# Patient Record
Sex: MALE | Race: BLACK OR AFRICAN AMERICAN | Employment: OTHER | URBAN - METROPOLITAN AREA
[De-identification: names, ages, dates, MRNs, and addresses within clinical notes are randomized per-mention and may not be internally consistent; named-entity substitution may affect disease eponyms.]

---

## 2019-01-23 ENCOUNTER — HOSPITAL ENCOUNTER (OUTPATIENT)
Age: 49
Setting detail: SPECIMEN
Discharge: HOME | End: 2019-01-23
Payer: MEDICARE

## 2019-01-23 LAB
ABSOLUTE EOS #: 0.19 K/UL (ref 0–0.44)
ABSOLUTE IMMATURE GRANULOCYTE: 0.26 K/UL (ref 0–0.3)
ABSOLUTE LYMPH #: 2.19 K/UL (ref 1.1–3.7)
ABSOLUTE MONO #: 0.63 K/UL (ref 0.1–1.2)
ALBUMIN SERPL-MCNC: 4.5 G/DL (ref 3.5–5.2)
ALBUMIN/GLOBULIN RATIO: 1.7 (ref 1–2.5)
ALP BLD-CCNC: 86 U/L (ref 40–129)
ALT SERPL-CCNC: 19 U/L (ref 5–41)
ANION GAP SERPL CALCULATED.3IONS-SCNC: 20 MMOL/L (ref 9–17)
AST SERPL-CCNC: 11 U/L
BASOPHILS # BLD: 1 % (ref 0–2)
BASOPHILS ABSOLUTE: 0.06 K/UL (ref 0–0.2)
BILIRUB SERPL-MCNC: 0.19 MG/DL (ref 0.3–1.2)
BUN BLDV-MCNC: 18 MG/DL (ref 6–20)
BUN/CREAT BLD: ABNORMAL (ref 9–20)
C-REACTIVE PROTEIN: 28.3 MG/L (ref 0–5)
CALCIUM SERPL-MCNC: 9.9 MG/DL (ref 8.6–10.4)
CHLORIDE BLD-SCNC: 102 MMOL/L (ref 98–107)
CO2: 27 MMOL/L (ref 20–31)
CREAT SERPL-MCNC: 1.1 MG/DL (ref 0.7–1.2)
DIFFERENTIAL TYPE: ABNORMAL
EOSINOPHILS RELATIVE PERCENT: 2 % (ref 1–4)
GFR AFRICAN AMERICAN: >60 ML/MIN
GFR NON-AFRICAN AMERICAN: >60 ML/MIN
GFR SERPL CREATININE-BSD FRML MDRD: ABNORMAL ML/MIN/{1.73_M2}
GFR SERPL CREATININE-BSD FRML MDRD: ABNORMAL ML/MIN/{1.73_M2}
GLUCOSE BLD-MCNC: 151 MG/DL (ref 70–99)
HBV SURFACE AB TITR SER: <3.5 MIU/ML
HCT VFR BLD CALC: 41.1 % (ref 40.7–50.3)
HEMOGLOBIN: 13.1 G/DL (ref 13–17)
HEPATITIS B CORE IGM ANTIBODY: NONREACTIVE
HEPATITIS C ANTIBODY: NONREACTIVE
IMMATURE GRANULOCYTES: 3 %
LYMPHOCYTES # BLD: 21 % (ref 24–43)
MCH RBC QN AUTO: 31.1 PG (ref 25.2–33.5)
MCHC RBC AUTO-ENTMCNC: 31.9 G/DL (ref 28.4–34.8)
MCV RBC AUTO: 97.6 FL (ref 82.6–102.9)
MONOCYTES # BLD: 6 % (ref 3–12)
NRBC AUTOMATED: 0 PER 100 WBC
PDW BLD-RTO: 16.4 % (ref 11.8–14.4)
PLATELET # BLD: 285 K/UL (ref 138–453)
PLATELET ESTIMATE: ABNORMAL
PMV BLD AUTO: 11.3 FL (ref 8.1–13.5)
POTASSIUM SERPL-SCNC: 4.4 MMOL/L (ref 3.7–5.3)
RBC # BLD: 4.21 M/UL (ref 4.21–5.77)
RBC # BLD: ABNORMAL 10*6/UL
RHEUMATOID FACTOR: <10 IU/ML
SEDIMENTATION RATE, ERYTHROCYTE: 36 MM (ref 0–10)
SEG NEUTROPHILS: 67 % (ref 36–65)
SEGMENTED NEUTROPHILS ABSOLUTE COUNT: 6.96 K/UL (ref 1.5–8.1)
SODIUM BLD-SCNC: 149 MMOL/L (ref 135–144)
TOTAL PROTEIN: 7.2 G/DL (ref 6.4–8.3)
URIC ACID: 11.3 MG/DL (ref 3.4–7)
WBC # BLD: 10.3 K/UL (ref 3.5–11.3)
WBC # BLD: ABNORMAL 10*3/UL

## 2019-01-24 LAB — CCP IGG ANTIBODIES: <1.5 U/ML

## 2019-01-25 LAB — HEPATITIS BE ANTIGEN: NEGATIVE

## 2019-01-26 LAB
QUANTI TB GOLD PLUS: NEGATIVE
QUANTI TB1 MINUS NIL: 0 IU/ML (ref 0–0.34)
QUANTI TB2 MINUS NIL: 0.02 IU/ML (ref 0–0.34)
QUANTIFERON MITOGEN: >10 IU/ML
QUANTIFERON NIL: 0.03 IU/ML

## 2019-04-09 ENCOUNTER — OUTSIDE SERVICES (OUTPATIENT)
Dept: FAMILY MEDICINE CLINIC | Age: 49
End: 2019-04-09
Payer: MEDICARE

## 2019-04-09 VITALS
TEMPERATURE: 100 F | HEART RATE: 102 BPM | BODY MASS INDEX: 42.48 KG/M2 | OXYGEN SATURATION: 94 % | WEIGHT: 279.4 LBS | DIASTOLIC BLOOD PRESSURE: 94 MMHG | SYSTOLIC BLOOD PRESSURE: 152 MMHG | RESPIRATION RATE: 18 BRPM

## 2019-04-09 DIAGNOSIS — R53.81 PHYSICAL DECONDITIONING: Primary | ICD-10-CM

## 2019-04-09 DIAGNOSIS — N39.0 URINARY TRACT INFECTION IN MALE: ICD-10-CM

## 2019-04-09 DIAGNOSIS — K21.9 GASTROESOPHAGEAL REFLUX DISEASE, ESOPHAGITIS PRESENCE NOT SPECIFIED: ICD-10-CM

## 2019-04-09 DIAGNOSIS — Z91.199 MEDICAL NON-COMPLIANCE: ICD-10-CM

## 2019-04-09 DIAGNOSIS — F33.42 RECURRENT MAJOR DEPRESSIVE DISORDER, IN FULL REMISSION (HCC): ICD-10-CM

## 2019-04-09 DIAGNOSIS — M06.09 RHEUMATOID ARTHRITIS OF MULTIPLE SITES WITH NEGATIVE RHEUMATOID FACTOR (HCC): ICD-10-CM

## 2019-04-09 DIAGNOSIS — R11.2 INTRACTABLE VOMITING WITH NAUSEA, UNSPECIFIED VOMITING TYPE: ICD-10-CM

## 2019-04-09 DIAGNOSIS — E11.42 TYPE 2 DIABETES MELLITUS WITH DIABETIC POLYNEUROPATHY, WITHOUT LONG-TERM CURRENT USE OF INSULIN (HCC): ICD-10-CM

## 2019-04-09 DIAGNOSIS — I10 ESSENTIAL HYPERTENSION: ICD-10-CM

## 2019-04-09 DIAGNOSIS — N40.0 BENIGN PROSTATIC HYPERPLASIA WITHOUT LOWER URINARY TRACT SYMPTOMS: ICD-10-CM

## 2019-04-09 DIAGNOSIS — R44.3 HALLUCINATIONS: ICD-10-CM

## 2019-04-09 DIAGNOSIS — D72.829 LEUKOCYTOSIS, UNSPECIFIED TYPE: ICD-10-CM

## 2019-04-09 DIAGNOSIS — R10.84 DIFFUSE ABDOMINAL PAIN: ICD-10-CM

## 2019-04-09 DIAGNOSIS — I48.91 ATRIAL FIBRILLATION, UNSPECIFIED TYPE (HCC): ICD-10-CM

## 2019-04-09 DIAGNOSIS — F39 MOOD DISORDER (HCC): ICD-10-CM

## 2019-04-09 DIAGNOSIS — R19.8 ABDOMINAL GUARDING: ICD-10-CM

## 2019-04-09 PROCEDURE — 99306 1ST NF CARE HIGH MDM 50: CPT | Performed by: FAMILY MEDICINE

## 2019-04-09 PROCEDURE — 3046F HEMOGLOBIN A1C LEVEL >9.0%: CPT | Performed by: FAMILY MEDICINE

## 2019-04-09 PROCEDURE — 3017F COLORECTAL CA SCREEN DOC REV: CPT | Performed by: FAMILY MEDICINE

## 2019-04-09 NOTE — PROGRESS NOTES
H&P (Admission H&P at [facility])        NAME: Nehemias Nieves  DATE: 19  ROOM #: 31-2  CODE STATUS: FULL CODE  REASON FOR ADMISSION: Unable to care for himself  : 1970  ADMISSION DATE: 2019  SKILLED PATIENT: Yes    History obtained from chart review, the patient and nursing staff. SUBJECTIVE:  HPI: Nehemias Nieves is a 46 y.o. male. Pt seen and examined at bedside. Pt admitted to use as direct admit from [facility] ER. However, proper protocol was not followed. Typically I must be allowed to review case prior to accepting. I was never notified. Appears pt was in ER d/t being unable to care for himself d/t his rheumatoid arthritis. Currently not following with rheum and doesn't take meds as he should. D/c to us on percocet and his home meds. However, in the ER and since here has had severe abdominal pain. Rates it as an 8/10. Sharp and severe. In ER had labs showing mild elevated WBC cnt and non-acute KUB. No further w/u obtained. He states he's been vomiting for days, has vomited several times since arrival at 0600. Unable to keep anything down. abd pain worsening. Passing flatus. Bowels normal.     Also given keflex for UTI      DM2: on janumet as OP. Pt not clear on control. Complicated by neuropathy. On jerod. Works ok. HTN: on norvasc, chlorthalidone, dilt 60mg tid, and hydralazine. BP high on admission, but in pain. Unclear control. Denies CP/sOB    AFib: not clear if parox or perm. On CCB and eliquis. Pt states hasn't seen cardio in \"forever\". Can't recall whom he saw last.    Depression/mood disorder: follows with Anita. On celexa and abilify. Has occ hallucinations. None at present. Denies SI/HI. GERD: on pantoprazole. Helps with his GERD,. Not with abd pain he's having at present    BPH: on flomax, when takes, works well for him      Allergies and Medications were reviewed through the [facility] EMR.  All medications reviewed and reconciled, including OTC and deconditioning    Once stable, will need PT/OT and be compliant with medical regimen    2. Diffuse abdominal pain    Unclear etiology  Worsening since admission  Not clear if having this severe of pain in ER  But with his hx and exam, needs f/u in ER before stable for admission  Report called to ER  F/u based on ER dispo    3. Abdominal guarding    As per # 2    4. Intractable vomiting with nausea, unspecified vomiting type    As per # 2    5. Leukocytosis, unspecified type    As per # 2  Could be d/t RA, but needs more of a w/u before stable for admission at the facility    6. Rheumatoid arthritis of multiple sites with negative rheumatoid factor ([address]    Neg labs for RA in the past, not to say he doesn't have sero neg RA  Is in between rheumatologists at present  Is on methotrexate and plaquenil, will con't this  If comes back to us today, will start on pred taper as well. Will see if can get into Dr. Danette Clay sooner, is on his schedule for OCT. 7. Urinary tract infection in male    Finish keflex  Unclear is cause of abd pain    8. Medical non-compliance    Causing a lot of his issues at present    9. Type 2 diabetes mellitus with diabetic polyneuropathy, without long-term current use of insulin ([facility]    Once returns to facility will get updated labs to where we are with control  Con't Janumet    10. Essential hypertension    con't chlorthalidone, dilt, hydralazine  Trend BP's upon return    11. Atrial fibrillation, unspecified type ([facility]    con't dilt and eliquis    12. Recurrent major depressive disorder, in full remission ([facility]    Appears stable  Will need f/u with [facility]  con't abilify and celexa    Antipsychotic/Antianxiety/Hypnotic/Psychotropic/Sedation/Antidepressant medications are continued at this time because discontinuation may result in adverse effects or return of concerning behaviors/symptoms. 13. Mood disorder ([facility]    As per # 12    14. Hallucinations    As per # 12    15.  Gastroesophageal reflux disease, esophagitis presence not specified    con't pqantoprazole    16. Benign prostatic hyperplasia without lower urinary tract symptoms    con't flomax      Disposition: sent back to ER d/t worsening abdominal pain, nausea and vomiting.  45+ minutes spent in chart review, patient evaluation and care plan creation      Electronically signed by Amy Almendarez DO on 4/9/2019 at 1:15 PM

## 2019-04-23 NOTE — PROGRESS NOTES
H&P ([facility])        NAME: Fransisca Eisenberg  DATE: 19  ROOM #: 40-2  CODE STATUS: FULL CODE  REASON FOR READMISSION: FEVER, NAUSEA, SEVERE RA FLARE  : 1970  ADMISSION DATE: 2019  READMISSION DATE: 2019  SKILLED PATIENT: Yes    History obtained from chart review, the patient and nursing staff. SUBJECTIVE:  HPI: Fransisca Eisenberg is a 46 y.o. male. Pt seen and examined at bedside. Patient admitted to [facility] from  to  for uro sepsis and RA flare. D/c summary:  Hospital Course - Discharge Diagnoses   (1) Rheumatoid arthritis Status: Chronic Qualifiers: Rheumatoid arthritis location: unspecified site Rheumatoid factor presence: unspecified presence Qualified Code(s): M06.9 - Rheumatoid arthritis, unspecified Home Going Treatment Plan: Rheumatoid arthritis flareup-resolved Patient was started on Solu-Medrol, kept on for many days, his her rheumatoid arthritis has been gradually improving. Imuran has been added. Patient will be discharged with Imuran, prednisone taper and instructions to resume taking his rheumatoid arthritis medications [patient has interrupted taking these medications for over 2 months due to not being able to be seen by a rheumatologist, which likely led to the flareup]     (2) Connective tissue disease Status: Acute     (3) Diastolic CHF Status: Acute Qualifiers: Heart failure chronicity: chronic Qualified Code(s): I50.32 - Chronic diastolic (congestive) heart failure Home Going Treatment Plan: With mild exacerbation on presentation. Patient was initially started on Lasix 40 mg IV, diuresed well and will currently be switched to oral Lasix.      (4) HTN (hypertension) Status: Chronic Qualifiers: Hypertension type: essential hypertension Qualified Code(s): I10 - Essential (primary) hypertension Home Going Treatment Plan: continue antihypertensives     (5) Weakness Status: Acute     (6) DM2 (diabetes mellitus, type 2) Status: Chronic admission have been appropriate 140s. No lows. HTN: on norvasc, dilt 60mg tid, and hydralazine. Was on chlorthalidone prior, but this was changed to lasix. BP since admission has been <140/90. No CP/SOB    AFib/HF with preserved EF: not clear if parox or perm. On CCB and eliquis as well as lasix. Pt states hasn't seen cardio in \"forever\". Can't recall whom he saw last. Denies CP/SOB    Depression/mood disorder: follows with Anita. On celexa and abilify. Has occ hallucinations. None at present. Denies SI/HI. GERD: on pantoprazole. Helps with his GERD. Denies sxs or dysphagia    BPH: on flomax, when takes, works well for him    Vit d def: noted on hospital. Treated during admission, but treatment not continued on d/c for unclear reasons. Low testosterone: given an IM dose of testosterone during admission. Unclear dose. Unclear plan at discharge. He discharged with no orders to continue. He has f/u labs for this ordered as well as AM and PM cortisol, but it's not clear who ordered these and why. They are set for 2 wks from now    Buttocks wound: following with Dr. Erika Beltran for this. Saw him earlier today. Wound care plan in place. Unable to assess. Denies sig pain. Allergies and Medications were reviewed through the [facility] EMR. All medications reviewed and reconciled, including OTC and herbal medications. Past Medical Hx  -01. DM2  -02. RA  -03. HTN  -04. GERD  -05. Atrial Fibrillation   -06. Major depression  -07. Mood disorder  -08. DURAN  -09. BPH  -10. Hx of ETOH abuse  -11. Morbid obesity  -12. Hx of R foot wound, osteomyelitis July 2018 requiring surgery and IV Vanco  -13. HF with reduced EF  -14. Vit D def  -15.  Low testosterone      Past Surgical History:   Procedure Laterality Date    FOOT SURGERY Right     2018, R foot osteomyelitis    TRACHEOSTOMY      as a baby       Allergies   Allergen Reactions    Pcn [Penicillins]        Social History     Tobacco Use    Smoking status: Never Smoker    Smokeless tobacco: Never Used   Substance Use Topics    Alcohol use: No     Comment: hx of abuse        Family History   Problem Relation Age of Onset    Heart Disease Mother         MI    Cancer Paternal Aunt         lung         I have reviewed the patient's past medical history, past surgical history, allergies, medications, social and family history and I have made updates where appropriate. Review of Systems  Positive responses are highlighted in bold    Constitutional:  Fever, Chills, Night Sweats, Fatigue, Unexpected changes in weight  Eyes:  Eye discharge, Eye pain, Eye redness, Visual disturbances   HENT:  Ear pain, Tinnitus, Nosebleeds, Trouble swallowing, Hearing loss, Sore throat  Cardiovascular:  Chest Pain, Palpitations, Orthopnea, Paroxysmal Nocturnal Dyspnea  Respiratory:  Cough, Wheezing, Shortness of breath, Chest tightness, Apnea  Gastrointestinal:  Nausea, Vomiting, Diarrhea, Constipation, Heartburn, Blood in stool  Genitourinary:  Difficulty or painful urination, Flank pain, Change in frequency, Urgency  Skin:  Color change, Rash, Itching, Wound  Psychiatric:  Hallucinations, Anxiety, Depression, Suicidal ideation  Hematological:  Enlarged glands, Easy bleeding, Easily bruising  Musculoskeletal:  Joint pain, Back pain, Gait problems, Joint swelling, Myalgias  Neurological:  Dizziness, Headaches, Presyncope, Numbness, Seizures, Tremors  Allergy:  Environmental allergies, Food allergies  Endocrine:  Heat Intolerance, Cold Intolerance, Polydipsia, Polyphagia, Polyuria      PHYSICAL EXAM:  Vitals:    04/25/19 0545   BP: 128/84   Pulse: 76   Resp: 18   Temp: 98.7 °F (37.1 °C)   Weight: 265 lb 6.4 oz (120.4 kg)   Height: 5' 8\" (1.727 m)     Body mass index is 40.35 kg/m².   Pain: 3 (joints/back)    VS Reviewed  General Appearance: well developed and well- nourished, in no acute distress  Head: normocephalic and atraumatic  Eyes: pupils equal, round, and reactive to light, mg/dL      Creatinine                       1.07                       0. 70-1.30 mg/dL      GFR Calculation                  > 60                           AGE(years)       AVERAGE GFR                            50-59           93 ml/min/1.73 square meters                           Note:This result is normalized to 1.73 square meter                                body surface area. Height and weight are not                                factored.                               Chronic Kidney Disease stages by NKDF                           Stage       eGFR                           --------------------------                            I           >90                            II          60-89                            III         30-59                            IV          15-29                            V           <15 or dialysis      Calcium                          8.5                  L     8.8-10.5 mg/dL      CBC 21 APR 2019  CBC with Differential      WBC                              10.8                 H     4.4-10.5 th/cmm      RBC                              3.91                 L     4.50-6.00 mil/cmm      Hemoglobin                       11.0                 L     13.5-16.5 gm/dL      Hematocrit                       34.2                 L     40.0-49.0 %      MCV                              87.4                       80-97 CU AISSATOU      MCH                              28.1                       27.5-33.0 PG      MCHC                             32.2                 L     33.0-36.0 gm/dL      RDW                              15.8                       12.0-16.0 %      Platelet Count                   666                  H     150-400 th/cmm      Auto Diff        Neut-Auto Diff                 72.7                 H     40-70 %        Lymph- Auto Diff               17.5                #      15-45 %        Mono- Auto Diff                9.0                 #      2-10 %        EOS-Auto Diff 0.5                        0-6 %        Baso- Auto Diff                0.3                        0-2 %        NRBC-Auto                      0.2                        <1 /100 WBC      Absolute Cell Count        Abs Neut Count                 7800                 H     6830-0046 /cmm        Abs Lymph Count                1900                       7555-2032 /cmm        Abs Mono Count                 1000                 H     0-800 /cmm        Abs Eos Count                  100                        0-500 /cmm        Abs Baso Count                  0                         0-200 /cmm      Exam Date: 04/14/19  Accession #:  [id number]  Exam:  CT   CT Abd/Pelvis W/O Cont [id number]  Result:    STUDY:  CT ABDOMEN AND PELVIS WITHOUT CONTRAST     REASON FOR EXAM:   Male, 46years old. Abdominal pain. Vomiting. TECHNIQUE:   Transaxial images were obtained from the dome of the   diaphragm to the symphysis pubis without oral contrast, and without   intravenous contrast.  Sagittal and coronal images were reconstructed. Individualized dose optimization techniques were used for this CT.     COMPARISON:   4/9/2019.   ___________________________________     FINDINGS:     Evaluation of the abdominal viscera is limited in the absence of   intravenous contrast.     There is atelectasis at the lung bases. The visualized portions of the   heart and pericardium are within normal limits. There are no calcified gallstones present. The liver is low in density, consistent with fatty infiltration. The spleen is normal in size. The pancreas demonstrates an unremarkable unenhanced appearance. The adrenal glands are within normal limits. There are no renal or ureteral stones. There is no hydronephrosis. Normal visualized stomach. There is no bowel obstruction or inflammation. The appendix is not visualized, but there are no findings to suggest acute   appendicitis.      The aorta is normal in caliber. There is no abdominal or pelvic free air, free fluid, fluid collection or   lymphadenopathy. There are no destructive osseous lesions. There are stable subcutaneous calcifications. ___________________________________     IMPRESSION:     No acute abdominal or pelvic pathology demonstrated on this noncontrast   CT. Stable fatty infiltration of the liver. Stable subcutaneous calcifications. Electronically Signed:   Ambrose Alejo,   2019/04/14 at 11:04 EDT   Tel [phone], Service support  [phone], Fax [phone]      ASSESSMENT & PLAN  1. Physical deconditioning    con't PT/OT  Doing better    2. Acute urinary tract infection    Resolved  Monitor. 3. Nausea    Resolved     4. Rheumatoid arthritis of multiple sites with negative rheumatoid factor (HCC)    Much improved  con't imuran, methotrexate and plaquenil  On 20mg pred, instead of stopping abruptly in 7 days, will taper instead. If joint pain returns once steroids stopped, will resume at lowest effective dose. Has rheum apt, but no until OCT. Can't get in any sooner. 5. Fever, unspecified fever cause    Resolved. 6. Normocytic anemia    Mild  Stable  F/u labs ordered to trend  Likely d/t inflammatory condition    7. Thrombocytosis ([address]    As per # 6    8. Diffuse abdominal pain    Resolved  Neg w/u    9. Type 2 diabetes mellitus with diabetic polyneuropathy, without long-term current use of insulin (HCC)    So far sugars ok  Trend sugars  Adjust regimen based on #'s    con't janumet and SSI    10. Essential hypertension    At goal  con't dilt, norvasc and hydralazine    11. Atrial fibrillation, unspecified type ([address]    con't dilt, eliquis  Stable. Will need cardio f/u once stronger    12. Heart failure with preserved ejection fraction (HCC)    Daily wts  Lasix  Stable     13.  Recurrent major depressive disorder, in full remission ([address]    Appears stable  Will need f/u with [facility]  con't abilify and celexa     Antipsychotic/Antianxiety/Hypnotic/Psychotropic/Sedation/Antidepressant medications are continued at this time because discontinuation may result in adverse effects or return of concerning behaviors/symptoms. 14. Mood disorder ([facility]    As per # 43    89. Hallucinations    As per # 58    85. Gastroesophageal reflux disease, esophagitis presence not specified    con't pantoprazole    17. Benign prostatic hyperplasia without lower urinary tract symptoms    Stable, con't flomax    18. Vitamin D deficiency    Is off supplementation  Will check f/u labs, they are in the system already    23. Low testosterone in male    Did not come with orders to con't treatment  Has orders for f/u labs in 2 wks, also looking at AM and PM cortisol levels. It's not clear who ordered these or why. Will have Jake Cornell f/u with staff tomorrow (no body here now who knows) and see if we can figure out what the plan is with this. Will see what testosterone level is in 2 wks. 20. Wound of buttock, unspecified laterality, initial encounter    con't wound care      Disposition: FULL CODE. Con't PT/OT. Reassess 30 days, sooner prn.        Future Appointments   Date Time Provider Anamika Solorio   10/15/2019  8:45 AM Blayne Mckinney DO [facility]       Electronically signed by Tara Victoria DO on 4/25/2019 at 7:21 PM

## 2019-04-25 ENCOUNTER — OUTSIDE SERVICES (OUTPATIENT)
Dept: FAMILY MEDICINE CLINIC | Age: 49
End: 2019-04-25
Payer: MEDICARE

## 2019-04-25 VITALS
RESPIRATION RATE: 18 BRPM | WEIGHT: 265.4 LBS | TEMPERATURE: 98.7 F | SYSTOLIC BLOOD PRESSURE: 128 MMHG | HEIGHT: 68 IN | BODY MASS INDEX: 40.22 KG/M2 | HEART RATE: 76 BPM | DIASTOLIC BLOOD PRESSURE: 84 MMHG

## 2019-04-25 DIAGNOSIS — R11.0 NAUSEA: ICD-10-CM

## 2019-04-25 DIAGNOSIS — E55.9 VITAMIN D DEFICIENCY: ICD-10-CM

## 2019-04-25 DIAGNOSIS — N40.0 BENIGN PROSTATIC HYPERPLASIA WITHOUT LOWER URINARY TRACT SYMPTOMS: ICD-10-CM

## 2019-04-25 DIAGNOSIS — F33.42 RECURRENT MAJOR DEPRESSIVE DISORDER, IN FULL REMISSION (HCC): ICD-10-CM

## 2019-04-25 DIAGNOSIS — D75.839 THROMBOCYTOSIS: ICD-10-CM

## 2019-04-25 DIAGNOSIS — R44.3 HALLUCINATIONS: ICD-10-CM

## 2019-04-25 DIAGNOSIS — R10.84 DIFFUSE ABDOMINAL PAIN: ICD-10-CM

## 2019-04-25 DIAGNOSIS — M06.09 RHEUMATOID ARTHRITIS OF MULTIPLE SITES WITH NEGATIVE RHEUMATOID FACTOR (HCC): ICD-10-CM

## 2019-04-25 DIAGNOSIS — I50.30 HEART FAILURE WITH PRESERVED EJECTION FRACTION (HCC): ICD-10-CM

## 2019-04-25 DIAGNOSIS — F39 MOOD DISORDER (HCC): ICD-10-CM

## 2019-04-25 DIAGNOSIS — I10 ESSENTIAL HYPERTENSION: ICD-10-CM

## 2019-04-25 DIAGNOSIS — R53.81 PHYSICAL DECONDITIONING: Primary | ICD-10-CM

## 2019-04-25 DIAGNOSIS — E11.42 TYPE 2 DIABETES MELLITUS WITH DIABETIC POLYNEUROPATHY, WITHOUT LONG-TERM CURRENT USE OF INSULIN (HCC): ICD-10-CM

## 2019-04-25 DIAGNOSIS — K21.9 GASTROESOPHAGEAL REFLUX DISEASE, ESOPHAGITIS PRESENCE NOT SPECIFIED: ICD-10-CM

## 2019-04-25 DIAGNOSIS — R79.89 LOW TESTOSTERONE IN MALE: ICD-10-CM

## 2019-04-25 DIAGNOSIS — S31.809A WOUND OF BUTTOCK, UNSPECIFIED LATERALITY, INITIAL ENCOUNTER: ICD-10-CM

## 2019-04-25 DIAGNOSIS — N39.0 ACUTE URINARY TRACT INFECTION: ICD-10-CM

## 2019-04-25 DIAGNOSIS — R50.9 FEVER, UNSPECIFIED FEVER CAUSE: ICD-10-CM

## 2019-04-25 DIAGNOSIS — D64.9 NORMOCYTIC ANEMIA: ICD-10-CM

## 2019-04-25 DIAGNOSIS — I48.91 ATRIAL FIBRILLATION, UNSPECIFIED TYPE (HCC): ICD-10-CM

## 2019-04-25 PROCEDURE — 99310 SBSQ NF CARE HIGH MDM 45: CPT | Performed by: FAMILY MEDICINE

## 2019-04-25 PROCEDURE — 3046F HEMOGLOBIN A1C LEVEL >9.0%: CPT | Performed by: FAMILY MEDICINE

## 2019-07-28 NOTE — PROGRESS NOTES
No joint swelling or gross deformity   Neuro:  Alert, 2+ patellar reflexes b/l,  normal speech, no focal findings or movement disorder noted  Psych:  Normal affect without evidence of depression or anxiety, insight and judgement are appropriate, memory appears intact  Skin: warm and dry, no rash or erythema  Lymph:  No cervical, auricular or supraclavicular lymph nodes palpated      LABS/IMAGING                  BMP/CRP 21 July 4106  Basic Metabolic,Non-Fasting      Sodium                           143                        135-145 mEq/L      Potassium                        4.0                        3.6-5.0 mEq/L      Chloride                          94                  L     101-111 mEq/L      Carbon Dioxide                    31                        21-32 mEq/L      Anion Gap                         18                  H     4-12      Glucose                          234                  H      mg/dL      BUN                               37                  H     7-20 mg/dL      Creatinine                       2.21                 H     0.60-1.30 mg/dL      GFR Calculation                   38                  L                           AGE(years)       AVERAGE GFR                            50-59           93 ml/min/1.73 square meters                           Note:This result is normalized to 1.73 square meter                                body surface area. Height and weight are not                                factored. Chronic Kidney Disease stages by NKDF                           Stage       eGFR                           --------------------------                            I           >90                            II          60-89                            III         30-59                            IV          15-29                            V           <15 or dialysis      Calcium                         10.20                       8. 8-10.5 mg/dL

## 2019-07-30 ENCOUNTER — OUTSIDE SERVICES (OUTPATIENT)
Dept: PHYSICAL MEDICINE AND REHAB | Age: 49
End: 2019-07-30
Payer: MEDICARE

## 2019-07-30 DIAGNOSIS — M06.09 RHEUMATOID ARTHRITIS OF MULTIPLE SITES WITH NEGATIVE RHEUMATOID FACTOR (HCC): ICD-10-CM

## 2019-07-30 PROCEDURE — 99305 1ST NF CARE MODERATE MDM 35: CPT | Performed by: PHYSICAL MEDICINE & REHABILITATION

## 2019-08-01 ENCOUNTER — OUTSIDE SERVICES (OUTPATIENT)
Dept: FAMILY MEDICINE CLINIC | Age: 49
End: 2019-08-01
Payer: MEDICARE

## 2019-08-01 VITALS
DIASTOLIC BLOOD PRESSURE: 80 MMHG | WEIGHT: 270 LBS | SYSTOLIC BLOOD PRESSURE: 131 MMHG | HEIGHT: 68 IN | HEART RATE: 76 BPM | TEMPERATURE: 97.8 F | BODY MASS INDEX: 40.92 KG/M2 | RESPIRATION RATE: 18 BRPM

## 2019-08-01 DIAGNOSIS — E55.9 VITAMIN D DEFICIENCY: ICD-10-CM

## 2019-08-01 DIAGNOSIS — Z79.4 TYPE 2 DIABETES MELLITUS WITH DIABETIC POLYNEUROPATHY, WITH LONG-TERM CURRENT USE OF INSULIN (HCC): ICD-10-CM

## 2019-08-01 DIAGNOSIS — F33.42 RECURRENT MAJOR DEPRESSIVE DISORDER, IN FULL REMISSION (HCC): ICD-10-CM

## 2019-08-01 DIAGNOSIS — N40.0 BENIGN PROSTATIC HYPERPLASIA WITHOUT LOWER URINARY TRACT SYMPTOMS: ICD-10-CM

## 2019-08-01 DIAGNOSIS — R53.81 PHYSICAL DECONDITIONING: ICD-10-CM

## 2019-08-01 DIAGNOSIS — S31.809A WOUND OF BUTTOCK, UNSPECIFIED LATERALITY, INITIAL ENCOUNTER: ICD-10-CM

## 2019-08-01 DIAGNOSIS — K21.9 GASTROESOPHAGEAL REFLUX DISEASE, ESOPHAGITIS PRESENCE NOT SPECIFIED: ICD-10-CM

## 2019-08-01 DIAGNOSIS — E11.42 TYPE 2 DIABETES MELLITUS WITH DIABETIC POLYNEUROPATHY, WITH LONG-TERM CURRENT USE OF INSULIN (HCC): ICD-10-CM

## 2019-08-01 DIAGNOSIS — M06.09 RHEUMATOID ARTHRITIS OF MULTIPLE SITES WITH NEGATIVE RHEUMATOID FACTOR (HCC): Primary | ICD-10-CM

## 2019-08-01 DIAGNOSIS — I48.91 ATRIAL FIBRILLATION, UNSPECIFIED TYPE (HCC): ICD-10-CM

## 2019-08-01 DIAGNOSIS — F39 MOOD DISORDER (HCC): ICD-10-CM

## 2019-08-01 DIAGNOSIS — I10 ESSENTIAL HYPERTENSION: ICD-10-CM

## 2019-08-01 DIAGNOSIS — N17.9 AKI (ACUTE KIDNEY INJURY) (HCC): ICD-10-CM

## 2019-08-01 DIAGNOSIS — R44.3 HALLUCINATIONS: ICD-10-CM

## 2019-08-01 DIAGNOSIS — I50.32 CHRONIC HEART FAILURE WITH PRESERVED EJECTION FRACTION (HCC): ICD-10-CM

## 2019-08-01 DIAGNOSIS — D64.9 NORMOCYTIC ANEMIA: ICD-10-CM

## 2019-08-01 PROCEDURE — 99305 1ST NF CARE MODERATE MDM 35: CPT | Performed by: FAMILY MEDICINE

## 2019-08-08 ENCOUNTER — OUTSIDE SERVICES (OUTPATIENT)
Dept: FAMILY MEDICINE CLINIC | Age: 49
End: 2019-08-08
Payer: MEDICARE

## 2019-08-08 VITALS
BODY MASS INDEX: 39.86 KG/M2 | DIASTOLIC BLOOD PRESSURE: 83 MMHG | HEART RATE: 60 BPM | HEIGHT: 68 IN | WEIGHT: 263 LBS | RESPIRATION RATE: 18 BRPM | TEMPERATURE: 98 F | SYSTOLIC BLOOD PRESSURE: 112 MMHG

## 2019-08-08 DIAGNOSIS — I10 ESSENTIAL HYPERTENSION: ICD-10-CM

## 2019-08-08 DIAGNOSIS — E11.42 TYPE 2 DIABETES MELLITUS WITH DIABETIC POLYNEUROPATHY, WITH LONG-TERM CURRENT USE OF INSULIN (HCC): ICD-10-CM

## 2019-08-08 DIAGNOSIS — M06.09 RHEUMATOID ARTHRITIS OF MULTIPLE SITES WITH NEGATIVE RHEUMATOID FACTOR (HCC): Primary | ICD-10-CM

## 2019-08-08 DIAGNOSIS — Z79.4 TYPE 2 DIABETES MELLITUS WITH DIABETIC POLYNEUROPATHY, WITH LONG-TERM CURRENT USE OF INSULIN (HCC): ICD-10-CM

## 2019-08-08 PROCEDURE — 99309 SBSQ NF CARE MODERATE MDM 30: CPT | Performed by: FAMILY MEDICINE

## 2019-08-12 NOTE — PROGRESS NOTES
file   Occupational History    Not on file   Social Needs    Financial resource strain: Not on file    Food insecurity:     Worry: Not on file     Inability: Not on file    Transportation needs:     Medical: Not on file     Non-medical: Not on file   Tobacco Use    Smoking status: Never Smoker    Smokeless tobacco: Never Used   Substance and Sexual Activity    Alcohol use: No     Comment: hx of abuse    Drug use: No    Sexual activity: Not on file   Lifestyle    Physical activity:     Days per week: Not on file     Minutes per session: Not on file    Stress: Not on file   Relationships    Social connections:     Talks on phone: Not on file     Gets together: Not on file     Attends Sikhism service: Not on file     Active member of club or organization: Not on file     Attends meetings of clubs or organizations: Not on file     Relationship status: Not on file    Intimate partner violence:     Fear of current or ex partner: Not on file     Emotionally abused: Not on file     Physically abused: Not on file     Forced sexual activity: Not on file   Other Topics Concern    Not on file   Social History Narrative    Not on file       Family History:       Problem Relation Age of Onset    Heart Disease Mother         MI   Genesis Robby Cancer Paternal Aunt         lung       Review of Systems:  CONSTITUTIONAL:  negative  EYES:  negative  HEENT:  negative  RESPIRATORY:  negative  CARDIOVASCULAR:  negative  GASTROINTESTINAL:  negative  GENITOURINARY:  negative  SKIN:  negative  HEMATOLOGIC/LYMPHATIC:  negative  MUSCULOSKELETAL:  positive for  myalgias, arthralgias, pain, joint swelling, stiff joints, decreased range of motion, muscle weakness and bone pain  NEUROLOGICAL:  positive for coordination problems, gait problems, weakness and pain  BEHAVIOR/PSYCH:  negative  10 point system review otherwise negative    Physical Exam:  Patient was noted to be awake, alert, and in no acute distress.   Orientation:   person,

## 2019-08-13 ENCOUNTER — OUTSIDE SERVICES (OUTPATIENT)
Dept: PHYSICAL MEDICINE AND REHAB | Age: 49
End: 2019-08-13
Payer: MEDICARE

## 2019-08-13 DIAGNOSIS — M06.9 RHEUMATOID ARTHRITIS INVOLVING MULTIPLE SITES, UNSPECIFIED RHEUMATOID FACTOR PRESENCE: ICD-10-CM

## 2019-08-13 DIAGNOSIS — M54.5 CHRONIC LOW BACK PAIN, UNSPECIFIED BACK PAIN LATERALITY, WITH SCIATICA PRESENCE UNSPECIFIED: ICD-10-CM

## 2019-08-13 DIAGNOSIS — G89.29 CHRONIC LOW BACK PAIN, UNSPECIFIED BACK PAIN LATERALITY, WITH SCIATICA PRESENCE UNSPECIFIED: ICD-10-CM

## 2019-08-13 PROCEDURE — 99309 SBSQ NF CARE MODERATE MDM 30: CPT | Performed by: PHYSICAL MEDICINE & REHABILITATION

## 2019-08-18 NOTE — PROGRESS NOTES
throat  Cardiovascular:  Chest Pain, Palpitations, Orthopnea, Paroxysmal Nocturnal Dyspnea  Respiratory:  Cough, Wheezing, Shortness of breath, Chest tightness, Apnea  Gastrointestinal:  Nausea, Vomiting, Diarrhea, Constipation, Heartburn, Blood in stool  Genitourinary:  Difficulty or painful urination, Flank pain, Change in frequency, Urgency  Skin:  Color change, Rash, Itching, Wound  Musculoskeletal:  Joint pain, Back pain, Gait problems, Joint swelling, Myalgias  Neurological:  Dizziness, Headaches, Presyncope, Numbness, Seizures, Tremors  Endocrine:  Heat Intolerance, Cold Intolerance, Polydipsia, Polyphagia, Polyuria      PHYSICAL EXAM:  Vitals:    08/22/19 0530   BP: 135/82   Pulse: 67   Resp: 16   Temp: 97.8 °F (36.6 °C)   Weight: 256 lb (116.1 kg)   Height: 5' 8\" (1.727 m)     Body mass index is 38.92 kg/m². Pain: 3 (joints)    VS Reviewed  General Appearance: well developed and well- nourished, in no acute distress  Head: normocephalic and atraumatic  Eyes: pupils equal, round, and reactive to light, conjunctivae and eye lids without erythema  ENT: external ear and ear canal normal bilaterally, nose without deformity, nasal mucosa and turbinates normal without polyps, oropharynx normal, dentition is normal for age, no lip or gum lesions noted  Neck: supple and non-tender without mass, no thyromegaly or thyroid nodules, no cervical lymphadenopathy  Pulmonary/Chest: clear to auscultation bilaterally- no wheezes, rales or rhonchi, normal air movement, no respiratory distress or retractions  Cardiovascular: normal rate, regular rhythm, normal S1 and S2, no murmurs, rubs, clicks, or gallops, distal pulses intact  Abdomen: soft, non-tender, non-distended, bowel sounds physiologic,  no rebound or guarding, no masses or hernias noted. Liver and spleen without enlargement.    Extremities: no cyanosis, clubbing or edema of the lower extremities  Musculoskeletal: No joint swelling or gross deformity   Neuro:  Alert, because discontinuation may result in adverse effects or return of concerning behaviors/symptoms.     10. Mood disorder ([address]     As per # 9     11. Hallucinations     As per # 9     12. Gastroesophageal reflux disease, esophagitis presence not specified     con't nexium     13. Benign prostatic hyperplasia without lower urinary tract symptoms     Stable  con't flomax     14. Vitamin D deficiency     con't supplementation  Check level next wk  Missed last month     15.  Wound of left buttock, subsequent encounter     Wound team following  con't vanc for 14 days  Pharmacy dosing, monitor levels closely  Appreciate their  help        Future Appointments   Date Time Provider Anamika Solorio   10/15/2019  8:45 AM Stevenson Chris DO [facility] [place]       Electronically signed by Sae Marinelli DO on 8/22/2019 at 10:51 AM

## 2019-08-22 ENCOUNTER — OUTSIDE SERVICES (OUTPATIENT)
Dept: FAMILY MEDICINE CLINIC | Age: 49
End: 2019-08-22
Payer: MEDICARE

## 2019-08-22 VITALS
HEIGHT: 68 IN | WEIGHT: 256 LBS | HEART RATE: 67 BPM | BODY MASS INDEX: 38.8 KG/M2 | DIASTOLIC BLOOD PRESSURE: 82 MMHG | RESPIRATION RATE: 16 BRPM | TEMPERATURE: 97.8 F | SYSTOLIC BLOOD PRESSURE: 135 MMHG

## 2019-08-22 DIAGNOSIS — E11.42 TYPE 2 DIABETES MELLITUS WITH DIABETIC POLYNEUROPATHY, WITH LONG-TERM CURRENT USE OF INSULIN (HCC): ICD-10-CM

## 2019-08-22 DIAGNOSIS — I50.32 CHRONIC HEART FAILURE WITH PRESERVED EJECTION FRACTION (HCC): ICD-10-CM

## 2019-08-22 DIAGNOSIS — I10 ESSENTIAL HYPERTENSION: ICD-10-CM

## 2019-08-22 DIAGNOSIS — R44.3 HALLUCINATIONS: ICD-10-CM

## 2019-08-22 DIAGNOSIS — M06.09 RHEUMATOID ARTHRITIS OF MULTIPLE SITES WITH NEGATIVE RHEUMATOID FACTOR (HCC): Primary | ICD-10-CM

## 2019-08-22 DIAGNOSIS — N40.0 BENIGN PROSTATIC HYPERPLASIA WITHOUT LOWER URINARY TRACT SYMPTOMS: ICD-10-CM

## 2019-08-22 DIAGNOSIS — E55.9 VITAMIN D DEFICIENCY: ICD-10-CM

## 2019-08-22 DIAGNOSIS — D64.9 NORMOCYTIC ANEMIA: ICD-10-CM

## 2019-08-22 DIAGNOSIS — F33.41 RECURRENT MAJOR DEPRESSIVE DISORDER, IN PARTIAL REMISSION (HCC): ICD-10-CM

## 2019-08-22 DIAGNOSIS — N17.9 AKI (ACUTE KIDNEY INJURY) (HCC): ICD-10-CM

## 2019-08-22 DIAGNOSIS — S31.829D WOUND OF LEFT BUTTOCK, SUBSEQUENT ENCOUNTER: ICD-10-CM

## 2019-08-22 DIAGNOSIS — F39 MOOD DISORDER (HCC): ICD-10-CM

## 2019-08-22 DIAGNOSIS — R53.81 PHYSICAL DECONDITIONING: ICD-10-CM

## 2019-08-22 DIAGNOSIS — K21.9 GASTROESOPHAGEAL REFLUX DISEASE, ESOPHAGITIS PRESENCE NOT SPECIFIED: ICD-10-CM

## 2019-08-22 DIAGNOSIS — Z79.4 TYPE 2 DIABETES MELLITUS WITH DIABETIC POLYNEUROPATHY, WITH LONG-TERM CURRENT USE OF INSULIN (HCC): ICD-10-CM

## 2019-08-22 DIAGNOSIS — I48.91 ATRIAL FIBRILLATION, UNSPECIFIED TYPE (HCC): ICD-10-CM

## 2019-08-22 PROCEDURE — 99309 SBSQ NF CARE MODERATE MDM 30: CPT | Performed by: FAMILY MEDICINE

## 2019-08-25 ASSESSMENT — ENCOUNTER SYMPTOMS
GASTROINTESTINAL NEGATIVE: 1
RESPIRATORY NEGATIVE: 1
BACK PAIN: 1
EYES NEGATIVE: 1
ALLERGIC/IMMUNOLOGIC NEGATIVE: 1

## 2019-08-25 NOTE — PROGRESS NOTES
Physical Medicine & Rehabilitation   Outpatient progress note    Chief Complaint: Chronic H/O Rheumatoid Arthritis. Subjective: Asad Alvarez is a 46 y.o. male who was reevaluated at the [facility]. He reported that his overall Rheumatoid Arthritis symptoms are much improved since he has been on his Steroid taper. He reported his current low back pain was at a 4 on a pain scale of 1-10 with 1 being no pain and 10 being intolerable pain. He noted no other trunk or limb pain. He is now able to walk well and is working with P.T. and O.T. He had no acute concerns. Review of Systems:  Review of Systems   Constitutional: Negative. HENT: Negative. Eyes: Negative. Respiratory: Negative. Cardiovascular: Negative. Gastrointestinal: Negative. Endocrine: Negative. Genitourinary: Negative. Musculoskeletal: Positive for back pain. Skin: Negative. Allergic/Immunologic: Negative. Neurological: Positive for weakness. Hematological: Negative. Psychiatric/Behavioral: Negative. All other systems reviewed and are negative. Physical Exam:  There were no vitals taken for this visit. Physical Exam   Constitutional: He is oriented to person, place, and time. He appears well-developed and well-nourished. HENT:   Head: Normocephalic and atraumatic. Right Ear: External ear normal.   Left Ear: External ear normal.   Nose: Nose normal.   Mouth/Throat: Oropharynx is clear and moist.   Cardiovascular: Normal rate, regular rhythm and normal heart sounds. Pulmonary/Chest: Effort normal and breath sounds normal.   Abdominal: Soft. Bowel sounds are normal.   Neurological: He is alert and oriented to person, place, and time. Skin: Skin is warm and dry. Psychiatric: He has a normal mood and affect.  His behavior is normal. Judgment and thought content normal.     Right Ankle Exam     Muscle Strength   Dorsiflexion:  4/5  Plantar flexion:  4/5      Left Ankle Exam

## 2019-09-30 ENCOUNTER — OFFICE VISIT (OUTPATIENT)
Dept: CARDIOLOGY CLINIC | Age: 49
End: 2019-09-30
Payer: MEDICARE

## 2019-09-30 VITALS
SYSTOLIC BLOOD PRESSURE: 116 MMHG | HEART RATE: 76 BPM | BODY MASS INDEX: 38.92 KG/M2 | DIASTOLIC BLOOD PRESSURE: 79 MMHG | HEIGHT: 68 IN

## 2019-09-30 DIAGNOSIS — E66.01 MORBID OBESITY (HCC): ICD-10-CM

## 2019-09-30 DIAGNOSIS — I50.32 CHRONIC DIASTOLIC CONGESTIVE HEART FAILURE (HCC): ICD-10-CM

## 2019-09-30 DIAGNOSIS — I10 ESSENTIAL HYPERTENSION: ICD-10-CM

## 2019-09-30 DIAGNOSIS — R07.9 CHEST PAIN IN ADULT: Primary | ICD-10-CM

## 2019-09-30 DIAGNOSIS — R94.31 ABNORMAL EKG: ICD-10-CM

## 2019-09-30 PROCEDURE — 93000 ELECTROCARDIOGRAM COMPLETE: CPT | Performed by: INTERNAL MEDICINE

## 2019-09-30 PROCEDURE — 3017F COLORECTAL CA SCREEN DOC REV: CPT | Performed by: INTERNAL MEDICINE

## 2019-09-30 PROCEDURE — 99204 OFFICE O/P NEW MOD 45 MIN: CPT | Performed by: INTERNAL MEDICINE

## 2019-09-30 PROCEDURE — G8427 DOCREV CUR MEDS BY ELIG CLIN: HCPCS | Performed by: INTERNAL MEDICINE

## 2019-09-30 PROCEDURE — G8417 CALC BMI ABV UP PARAM F/U: HCPCS | Performed by: INTERNAL MEDICINE

## 2019-09-30 PROCEDURE — 1036F TOBACCO NON-USER: CPT | Performed by: INTERNAL MEDICINE

## 2019-09-30 RX ORDER — GABAPENTIN 400 MG/1
800 CAPSULE ORAL 2 TIMES DAILY
Status: ON HOLD | COMMUNITY
End: 2020-11-06 | Stop reason: HOSPADM

## 2019-09-30 RX ORDER — TAMSULOSIN HYDROCHLORIDE 0.4 MG/1
0.4 CAPSULE ORAL NIGHTLY
COMMUNITY
End: 2022-03-14

## 2019-09-30 RX ORDER — SENNA PLUS 8.6 MG/1
1 TABLET ORAL 2 TIMES DAILY
COMMUNITY
End: 2022-09-12

## 2019-09-30 RX ORDER — OXYCODONE HYDROCHLORIDE AND ACETAMINOPHEN 5; 325 MG/1; MG/1
1 TABLET ORAL EVERY 4 HOURS PRN
COMMUNITY
End: 2019-12-25 | Stop reason: SDUPTHER

## 2019-09-30 RX ORDER — FUROSEMIDE 20 MG/1
20 TABLET ORAL DAILY
COMMUNITY
End: 2022-09-21 | Stop reason: SDUPTHER

## 2019-09-30 RX ORDER — DIAZEPAM 5 MG/1
5 TABLET ORAL EVERY 6 HOURS PRN
Status: ON HOLD | COMMUNITY
End: 2020-11-06 | Stop reason: HOSPADM

## 2019-09-30 RX ORDER — AZATHIOPRINE 50 MG/1
50 TABLET ORAL DAILY
COMMUNITY
End: 2019-10-15 | Stop reason: ALTCHOICE

## 2019-09-30 RX ORDER — INSULIN GLARGINE 100 [IU]/ML
10 INJECTION, SOLUTION SUBCUTANEOUS NIGHTLY
COMMUNITY
End: 2021-10-20

## 2019-09-30 RX ORDER — DILTIAZEM HYDROCHLORIDE 60 MG/1
60 TABLET, FILM COATED ORAL 2 TIMES DAILY
Status: ON HOLD | COMMUNITY
End: 2020-11-06 | Stop reason: HOSPADM

## 2019-09-30 RX ORDER — FOLIC ACID 1 MG/1
1 TABLET ORAL DAILY
COMMUNITY
End: 2022-09-12

## 2019-09-30 NOTE — PROGRESS NOTES
Chief Complaint   Patient presents with    New Patient    Hypertension   New patient establish cardiologist from [facility]    EKG done today.     Denied palpitations or dizziness    Chest pian atypical for several month  Chest soreness  On and off mild  Not related to exertion  Some chest wall tenderness parasternal    nevere smoked    FHX  Mother had MI at her 52's      Past- 2012 had cp and abn nuc then and did not want cath that time    Patient Active Problem List   Diagnosis    Depression    Foot pain    Fatigue    Bone disease    Abnormal liver function    Type II or unspecified type diabetes mellitus without mention of complication, not stated as uncontrolled    Obesity    DURAN (nonalcoholic steatohepatitis)    Alcohol abuse    DDD (degenerative disc disease)    Abdominal pain    Hypertension    Abnormal EKG with TWI in the inferior leads    Chest pain, atypical    Morbid obesity ([address]    Abnormal nuclear cardiac imaging test- mild inferior ichemia    Chest pain in adult- atypical    Chronic diastolic congestive heart failure (HCC)       Past Surgical History:   Procedure Laterality Date    FOOT SURGERY Right     2018, R foot osteomyelitis    TRACHEOSTOMY      as a baby       Allergies   Allergen Reactions    Pcn [Penicillins]         Family History   Problem Relation Age of Onset    Heart Disease Mother         MI   [place] Cancer Paternal Aunt         lung        Social History     Socioeconomic History    Marital status:      Spouse name: Not on file    Number of children: Not on file    Years of education: Not on file    Highest education level: Not on file   Occupational History    Not on file   Social Needs    Financial resource strain: Not on file    Food insecurity:     Worry: Not on file     Inability: Not on file    Transportation needs:     Medical: Not on file     Non-medical: Not on file   Tobacco Use    Smoking status: Never Smoker    Smokeless tobacco: Never Used for Anxiety.  ibuprofen (ADVIL;MOTRIN) 800 MG tablet Take 1 tablet by mouth every 8 hours as needed for Pain 30 tablet 0    ARIPiprazole (ABILIFY PO) Take 1 tablet by mouth.  metformin (GLUCOPHAGE) 1000 MG tablet Take 1,000 mg by mouth 2 times daily (with meals).  amlodipine (NORVASC) 10 MG tablet Take 10 mg by mouth daily.  cyclobenzaprine (FLEXERIL) 5 MG tablet Take 5 mg by mouth 3 times daily as needed.  Zolpidem Tartrate (AMBIEN PO) Take  by mouth nightly. From [employer]       Citalopram Hydrobromide (CELEXA PO) Take 40 mg by mouth From [employer] [facility]       Blood Glucose Monitoring Suppl (FREESTYLE LITE) ARTIE Patient needs all supplies for qd testing. DX: 250.00 1 Device 11     No current facility-administered medications for this visit. Review of Systems -     General ROS: negative  Psychological ROS: negative  Hematological and Lymphatic ROS: No history of blood clots or bleeding disorder. Respiratory ROS: no cough,  or wheezing, the rest see HPI  Cardiovascular ROS: See HPI  Gastrointestinal ROS: negative  Genito-Urinary ROS: no dysuria, trouble voiding, or hematuria  Musculoskeletal ROS: negative  Neurological ROS: no TIA or stroke symptoms  Dermatological ROS: negative      Blood pressure 116/79, pulse 76, height 5' 8\" (1.727 m).         Physical Examination:    General appearance - alert, well appearing, and in no distress  HEENT- Pink conjunctiva  , Non-icteri sclera,PERRLA  Mental status - alert, oriented to person, place, and time  Neck - supple, no significant adenopathy, no JVD, or carotid bruits  Chest - clear to auscultation, no wheezes, rales or rhonchi, symmetric air entry  Heart - normal rate, regular rhythm, normal S1, S2, no murmurs, rubs, clicks or gallops  Abdomen - soft, nontender, nondistended, no masses or organomegaly  JORDAN- no CVA or flank tenderness, no suprapubic tenderness  Neurological - alert, oriented, normal speech, no focal findings or movement disorder noted  Musculoskeletal/limbs - no joint tenderness, deformity or swelling   - peripheral pulses normal, no pedal edema, no clubbing or cyanosis  Skin - normal coloration and turgor, no rashes, no suspicious skin lesions noted  Psych- appropriate mood and affect    Lab  No results for input(s): CKTOTAL, CKMB, CKMBINDEX, TROPONINI in the last 72 hours. CBC:   Lab Results   Component Value Date    WBC 10.3 01/23/2019    RBC 4.21 01/23/2019    RBC 4.55 04/15/2012    HGB 13.1 01/23/2019    HCT 41.1 01/23/2019    MCV 97.6 01/23/2019    MCH 31.1 01/23/2019    MCHC 31.9 01/23/2019    RDW 16.4 01/23/2019     01/23/2019    MPV 11.3 01/23/2019     BMP:    Lab Results   Component Value Date     01/23/2019    K 4.4 01/23/2019     01/23/2019    CO2 27 01/23/2019    BUN 18 01/23/2019    LABALBU 4.5 01/23/2019    LABALBU 4.4 01/26/2012    CREATININE 1.10 01/23/2019    CALCIUM 9.9 01/23/2019    GFRAA >60 01/23/2019    LABGLOM >60 01/23/2019    LABGLOM >90 04/27/2016    GLUCOSE 151 01/23/2019    GLUCOSE 113 06/24/2018     Hepatic Function Panel:    Lab Results   Component Value Date    ALKPHOS 86 01/23/2019    ALT 19 01/23/2019    AST 11 01/23/2019    PROT 7.2 01/23/2019    BILITOT 0.19 01/23/2019    BILIDIR 0.2 01/26/2012    LABALBU 4.5 01/23/2019    LABALBU 4.4 01/26/2012     Magnesium:    Lab Results   Component Value Date    MG 1.9 06/24/2018     Warfarin PT/INR:  No components found for: PTPATWAR, PTINRWAR  HgBA1c:    Lab Results   Component Value Date    LABA1C 6.4 10/21/2011     FLP:  No results found for: TRIG, HDL, LDLCALC, LDLDIRECT, LABVLDL  TSH:  No results found for: TSH    EKG 9/30/19  Sinus  Rhythm   Low voltage in precordial leads.    -  Nonspecific T-abnormality. ABNORMAL     Assessment   Diagnosis Orders   1. Chest pain in adult- atypical  ECHO Complete 2D W Doppler W Color    Stress test, lexiscan   2.  Chronic diastolic congestive heart

## 2019-10-10 ENCOUNTER — HOSPITAL ENCOUNTER (OUTPATIENT)
Dept: NON INVASIVE DIAGNOSTICS | Age: 49
Discharge: HOME | End: 2019-10-10
Payer: MEDICARE

## 2019-10-10 VITALS — HEIGHT: 68 IN | BODY MASS INDEX: 42.89 KG/M2 | WEIGHT: 283 LBS

## 2019-10-10 DIAGNOSIS — I10 ESSENTIAL HYPERTENSION: ICD-10-CM

## 2019-10-10 DIAGNOSIS — I50.32 CHRONIC DIASTOLIC CONGESTIVE HEART FAILURE (HCC): ICD-10-CM

## 2019-10-10 DIAGNOSIS — R94.31 ABNORMAL EKG: ICD-10-CM

## 2019-10-10 DIAGNOSIS — E66.01 MORBID OBESITY (HCC): ICD-10-CM

## 2019-10-10 DIAGNOSIS — R07.9 CHEST PAIN IN ADULT: ICD-10-CM

## 2019-10-10 LAB
LV EF: 58 %
LVEF MODALITY: NORMAL

## 2019-10-10 PROCEDURE — 78452 HT MUSCLE IMAGE SPECT MULT: CPT

## 2019-10-10 PROCEDURE — A9500 TC99M SESTAMIBI: HCPCS | Performed by: INTERNAL MEDICINE

## 2019-10-10 PROCEDURE — 93306 TTE W/DOPPLER COMPLETE: CPT

## 2019-10-10 PROCEDURE — 2709999900 HC NON-CHARGEABLE SUPPLY

## 2019-10-10 PROCEDURE — 6360000002 HC RX W HCPCS

## 2019-10-10 PROCEDURE — 3430000000 HC RX DIAGNOSTIC RADIOPHARMACEUTICAL: Performed by: INTERNAL MEDICINE

## 2019-10-10 PROCEDURE — 93017 CV STRESS TEST TRACING ONLY: CPT | Performed by: INTERNAL MEDICINE

## 2019-10-10 RX ADMIN — Medication 9.6 MILLICURIE: at 13:59

## 2019-10-10 RX ADMIN — Medication 32.7 MILLICURIE: at 14:59

## 2019-10-15 ENCOUNTER — HOSPITAL ENCOUNTER (OUTPATIENT)
Age: 49
Discharge: HOME | End: 2019-10-15
Payer: MEDICARE

## 2019-10-15 ENCOUNTER — HOSPITAL ENCOUNTER (OUTPATIENT)
Dept: GENERAL RADIOLOGY | Age: 49
Discharge: HOME | End: 2019-10-15
Payer: MEDICARE

## 2019-10-15 ENCOUNTER — OFFICE VISIT (OUTPATIENT)
Dept: RHEUMATOLOGY | Age: 49
End: 2019-10-15
Payer: MEDICARE

## 2019-10-15 VITALS
OXYGEN SATURATION: 99 % | HEIGHT: 68 IN | DIASTOLIC BLOOD PRESSURE: 78 MMHG | HEART RATE: 73 BPM | SYSTOLIC BLOOD PRESSURE: 112 MMHG | BODY MASS INDEX: 43.04 KG/M2

## 2019-10-15 DIAGNOSIS — Z87.2 H/O DERMATOMYOSITIS: ICD-10-CM

## 2019-10-15 DIAGNOSIS — R53.83 FATIGUE, UNSPECIFIED TYPE: ICD-10-CM

## 2019-10-15 DIAGNOSIS — Z87.39 H/O: GOUT: ICD-10-CM

## 2019-10-15 DIAGNOSIS — M25.50 POLYARTHRALGIA: ICD-10-CM

## 2019-10-15 DIAGNOSIS — E83.59 CALCINOSIS: ICD-10-CM

## 2019-10-15 DIAGNOSIS — Z51.81 MEDICATION MONITORING ENCOUNTER: ICD-10-CM

## 2019-10-15 DIAGNOSIS — M25.50 POLYARTHRALGIA: Primary | ICD-10-CM

## 2019-10-15 DIAGNOSIS — Z87.39 H/O RHEUMATOID ARTHRITIS: ICD-10-CM

## 2019-10-15 LAB
ALBUMIN SERPL-MCNC: 4 G/DL (ref 3.5–5.1)
ALP BLD-CCNC: 84 U/L (ref 38–126)
ALT SERPL-CCNC: 17 U/L (ref 11–66)
ANION GAP SERPL CALCULATED.3IONS-SCNC: 17 MEQ/L (ref 8–16)
ANISOCYTOSIS: PRESENT
AST SERPL-CCNC: 14 U/L (ref 5–40)
BASOPHILS # BLD: 0.3 %
BASOPHILS ABSOLUTE: 0 THOU/MM3 (ref 0–0.1)
BILIRUB SERPL-MCNC: 0.2 MG/DL (ref 0.3–1.2)
BUN BLDV-MCNC: 26 MG/DL (ref 7–22)
C-REACTIVE PROTEIN: 0.4 MG/DL (ref 0–1)
CALCIUM SERPL-MCNC: 10 MG/DL (ref 8.5–10.5)
CHLORIDE BLD-SCNC: 100 MEQ/L (ref 98–111)
CO2: 28 MEQ/L (ref 23–33)
CREAT SERPL-MCNC: 1 MG/DL (ref 0.4–1.2)
CRYSTALS, FLUID: NORMAL
EOSINOPHIL # BLD: 0.6 %
EOSINOPHILS ABSOLUTE: 0.1 THOU/MM3 (ref 0–0.4)
ERYTHROCYTE [DISTWIDTH] IN BLOOD BY AUTOMATED COUNT: 20.4 % (ref 11.5–14.5)
ERYTHROCYTE [DISTWIDTH] IN BLOOD BY AUTOMATED COUNT: 67.6 FL (ref 35–45)
GLUCOSE BLD-MCNC: 110 MG/DL (ref 70–108)
HAV IGM SER IA-ACNC: NEGATIVE
HCT VFR BLD CALC: 42.3 % (ref 42–52)
HEMOGLOBIN: 13.3 GM/DL (ref 14–18)
HEPATITIS B CORE IGM ANTIBODY: NEGATIVE
HEPATITIS B SURFACE ANTIGEN: NEGATIVE
HEPATITIS C ANTIBODY: NEGATIVE
IMMATURE GRANS (ABS): 0.35 THOU/MM3 (ref 0–0.07)
IMMATURE GRANULOCYTES: 2.3 %
LD: 293 U/L (ref 100–190)
LYMPHOCYTES # BLD: 8 %
LYMPHOCYTES ABSOLUTE: 1.2 THOU/MM3 (ref 1–4.8)
MCH RBC QN AUTO: 28.8 PG (ref 26–33)
MCHC RBC AUTO-ENTMCNC: 31.4 GM/DL (ref 32.2–35.5)
MCV RBC AUTO: 91.6 FL (ref 80–94)
MONOCYTES # BLD: 4.2 %
MONOCYTES ABSOLUTE: 0.7 THOU/MM3 (ref 0.4–1.3)
NUCLEATED RED BLOOD CELLS: 0 /100 WBC
PHOSPHORUS: 3.8 MG/DL (ref 2.4–4.7)
PLATELET # BLD: 350 THOU/MM3 (ref 130–400)
PMV BLD AUTO: 10 FL (ref 9.4–12.4)
POTASSIUM SERPL-SCNC: 4.4 MEQ/L (ref 3.5–5.2)
RBC # BLD: 4.62 MILL/MM3 (ref 4.7–6.1)
SEDIMENTATION RATE, ERYTHROCYTE: 10 MM/HR (ref 0–10)
SEG NEUTROPHILS: 84.6 %
SEGMENTED NEUTROPHILS ABSOLUTE COUNT: 13.1 THOU/MM3 (ref 1.8–7.7)
SODIUM BLD-SCNC: 145 MEQ/L (ref 135–145)
TOTAL CK: 52 U/L (ref 55–170)
TOTAL PROTEIN: 7.2 G/DL (ref 6.1–8)
URIC ACID: 11.6 MG/DL (ref 3.7–7)
WBC # BLD: 15.5 THOU/MM3 (ref 4.8–10.8)

## 2019-10-15 PROCEDURE — 85651 RBC SED RATE NONAUTOMATED: CPT

## 2019-10-15 PROCEDURE — G8427 DOCREV CUR MEDS BY ELIG CLIN: HCPCS | Performed by: INTERNAL MEDICINE

## 2019-10-15 PROCEDURE — G8417 CALC BMI ABV UP PARAM F/U: HCPCS | Performed by: INTERNAL MEDICINE

## 2019-10-15 PROCEDURE — 73130 X-RAY EXAM OF HAND: CPT

## 2019-10-15 PROCEDURE — 36415 COLL VENOUS BLD VENIPUNCTURE: CPT

## 2019-10-15 PROCEDURE — 82550 ASSAY OF CK (CPK): CPT

## 2019-10-15 PROCEDURE — 86140 C-REACTIVE PROTEIN: CPT

## 2019-10-15 PROCEDURE — 80074 ACUTE HEPATITIS PANEL: CPT

## 2019-10-15 PROCEDURE — 71046 X-RAY EXAM CHEST 2 VIEWS: CPT

## 2019-10-15 PROCEDURE — 83615 LACTATE (LD) (LDH) ENZYME: CPT

## 2019-10-15 PROCEDURE — 99205 OFFICE O/P NEW HI 60 MIN: CPT | Performed by: INTERNAL MEDICINE

## 2019-10-15 PROCEDURE — 84550 ASSAY OF BLOOD/URIC ACID: CPT

## 2019-10-15 PROCEDURE — 73630 X-RAY EXAM OF FOOT: CPT

## 2019-10-15 PROCEDURE — 82085 ASSAY OF ALDOLASE: CPT

## 2019-10-15 PROCEDURE — G8484 FLU IMMUNIZE NO ADMIN: HCPCS | Performed by: INTERNAL MEDICINE

## 2019-10-15 PROCEDURE — 84100 ASSAY OF PHOSPHORUS: CPT

## 2019-10-15 PROCEDURE — 80053 COMPREHEN METABOLIC PANEL: CPT

## 2019-10-15 PROCEDURE — 85025 COMPLETE CBC W/AUTO DIFF WBC: CPT

## 2019-10-15 RX ORDER — ALLOPURINOL 300 MG/1
150 TABLET ORAL DAILY
Qty: 30 TABLET | Refills: 0 | Status: SHIPPED | OUTPATIENT
Start: 2019-10-15 | End: 2020-01-16

## 2019-10-15 ASSESSMENT — ENCOUNTER SYMPTOMS
EYES NEGATIVE: 1
EYE PAIN: 0
CONSTIPATION: 0
VOMITING: 0
DIARRHEA: 0
COUGH: 0
WHEEZING: 0
EYE REDNESS: 0
SHORTNESS OF BREATH: 0
NAUSEA: 0

## 2019-10-16 ENCOUNTER — TELEPHONE (OUTPATIENT)
Dept: RHEUMATOLOGY | Age: 49
End: 2019-10-16

## 2019-10-16 LAB — ALDOLASE: 6.4 U/L (ref 1.5–8.1)

## 2019-10-29 ENCOUNTER — OFFICE VISIT (OUTPATIENT)
Dept: CARDIOLOGY CLINIC | Age: 49
End: 2019-10-29
Payer: MEDICARE

## 2019-10-29 VITALS
DIASTOLIC BLOOD PRESSURE: 80 MMHG | HEART RATE: 76 BPM | HEIGHT: 68 IN | SYSTOLIC BLOOD PRESSURE: 126 MMHG | BODY MASS INDEX: 43.03 KG/M2

## 2019-10-29 DIAGNOSIS — I10 ESSENTIAL HYPERTENSION: ICD-10-CM

## 2019-10-29 DIAGNOSIS — E66.01 MORBID OBESITY (HCC): ICD-10-CM

## 2019-10-29 DIAGNOSIS — R94.31 ABNORMAL EKG: ICD-10-CM

## 2019-10-29 DIAGNOSIS — I50.32 CHRONIC DIASTOLIC CONGESTIVE HEART FAILURE (HCC): Primary | ICD-10-CM

## 2019-10-29 DIAGNOSIS — Z86.79 HISTORY OF ATRIAL FIBRILLATION: ICD-10-CM

## 2019-10-29 PROBLEM — R07.9 CHEST PAIN IN ADULT: Status: RESOLVED | Noted: 2019-09-30 | Resolved: 2019-10-29

## 2019-10-29 LAB — GFR SERPL CREATININE-BSD FRML MDRD: 79 ML/MIN/1.73M2

## 2019-10-29 PROCEDURE — 3017F COLORECTAL CA SCREEN DOC REV: CPT | Performed by: INTERNAL MEDICINE

## 2019-10-29 PROCEDURE — 99214 OFFICE O/P EST MOD 30 MIN: CPT | Performed by: INTERNAL MEDICINE

## 2019-10-29 PROCEDURE — G8484 FLU IMMUNIZE NO ADMIN: HCPCS | Performed by: INTERNAL MEDICINE

## 2019-10-29 PROCEDURE — G8417 CALC BMI ABV UP PARAM F/U: HCPCS | Performed by: INTERNAL MEDICINE

## 2019-10-29 PROCEDURE — G8427 DOCREV CUR MEDS BY ELIG CLIN: HCPCS | Performed by: INTERNAL MEDICINE

## 2019-10-29 PROCEDURE — 1036F TOBACCO NON-USER: CPT | Performed by: INTERNAL MEDICINE

## 2019-10-29 RX ORDER — ESOMEPRAZOLE MAGNESIUM 20 MG/1
20 FOR SUSPENSION ORAL DAILY
Status: ON HOLD | COMMUNITY
End: 2020-11-06 | Stop reason: HOSPADM

## 2019-10-29 RX ORDER — HYDRALAZINE HYDROCHLORIDE 50 MG/1
50 TABLET, FILM COATED ORAL 2 TIMES DAILY
COMMUNITY
End: 2021-11-29

## 2019-10-29 RX ORDER — ONDANSETRON 4 MG/1
4 TABLET, FILM COATED ORAL EVERY 8 HOURS PRN
COMMUNITY
End: 2022-09-12

## 2019-10-29 RX ORDER — ASCORBIC ACID 500 MG
500 TABLET ORAL 2 TIMES DAILY
Status: ON HOLD | COMMUNITY
End: 2020-09-15 | Stop reason: SDUPTHER

## 2019-10-29 RX ORDER — POLYETHYLENE GLYCOL 3350 17 G/17G
17 POWDER, FOR SOLUTION ORAL EVERY OTHER DAY
Status: ON HOLD | COMMUNITY
End: 2020-11-19 | Stop reason: HOSPADM

## 2019-10-29 RX ORDER — PREDNISONE 20 MG/1
7.5 TABLET ORAL DAILY
Status: ON HOLD | COMMUNITY
End: 2020-08-07 | Stop reason: SDUPTHER

## 2019-10-29 RX ORDER — M-VIT,TX,IRON,MINS/CALC/FOLIC 27MG-0.4MG
1 TABLET ORAL DAILY
COMMUNITY

## 2019-11-08 ENCOUNTER — OUTSIDE SERVICES (OUTPATIENT)
Dept: FAMILY MEDICINE CLINIC | Age: 49
End: 2019-11-08
Payer: MEDICARE

## 2019-11-08 DIAGNOSIS — G89.29 CHRONIC LEFT HIP PAIN: Primary | ICD-10-CM

## 2019-11-08 DIAGNOSIS — M25.552 CHRONIC LEFT HIP PAIN: Primary | ICD-10-CM

## 2019-11-08 PROCEDURE — 99307 SBSQ NF CARE SF MDM 10: CPT | Performed by: NURSE PRACTITIONER

## 2019-11-27 ENCOUNTER — TELEPHONE (OUTPATIENT)
Dept: FAMILY MEDICINE CLINIC | Age: 49
End: 2019-11-27

## 2019-12-18 ENCOUNTER — OFFICE VISIT (OUTPATIENT)
Dept: RHEUMATOLOGY | Age: 49
End: 2019-12-18
Payer: MEDICARE

## 2019-12-18 VITALS
SYSTOLIC BLOOD PRESSURE: 110 MMHG | WEIGHT: 294 LBS | HEART RATE: 72 BPM | HEIGHT: 68 IN | DIASTOLIC BLOOD PRESSURE: 80 MMHG | OXYGEN SATURATION: 93 % | BODY MASS INDEX: 44.56 KG/M2

## 2019-12-18 DIAGNOSIS — Z87.39 H/O: GOUT: Primary | ICD-10-CM

## 2019-12-18 DIAGNOSIS — Z87.39 H/O RHEUMATOID ARTHRITIS: ICD-10-CM

## 2019-12-18 DIAGNOSIS — Z51.81 MEDICATION MONITORING ENCOUNTER: ICD-10-CM

## 2019-12-18 PROCEDURE — 1036F TOBACCO NON-USER: CPT | Performed by: INTERNAL MEDICINE

## 2019-12-18 PROCEDURE — 99214 OFFICE O/P EST MOD 30 MIN: CPT | Performed by: INTERNAL MEDICINE

## 2019-12-18 PROCEDURE — G8427 DOCREV CUR MEDS BY ELIG CLIN: HCPCS | Performed by: INTERNAL MEDICINE

## 2019-12-18 PROCEDURE — G8484 FLU IMMUNIZE NO ADMIN: HCPCS | Performed by: INTERNAL MEDICINE

## 2019-12-18 PROCEDURE — G8417 CALC BMI ABV UP PARAM F/U: HCPCS | Performed by: INTERNAL MEDICINE

## 2019-12-18 PROCEDURE — 3017F COLORECTAL CA SCREEN DOC REV: CPT | Performed by: INTERNAL MEDICINE

## 2019-12-18 ASSESSMENT — ENCOUNTER SYMPTOMS
CONSTIPATION: 0
EYE PAIN: 0
EYE REDNESS: 0
WHEEZING: 0
COUGH: 0
VOMITING: 0
EYES NEGATIVE: 1
SHORTNESS OF BREATH: 0
NAUSEA: 0
DIARRHEA: 0

## 2019-12-25 DIAGNOSIS — G89.4 PAIN SYNDROME, CHRONIC: Primary | ICD-10-CM

## 2019-12-25 RX ORDER — OXYCODONE HYDROCHLORIDE AND ACETAMINOPHEN 5; 325 MG/1; MG/1
1 TABLET ORAL EVERY 4 HOURS PRN
Qty: 42 TABLET | Refills: 0 | Status: SHIPPED | OUTPATIENT
Start: 2019-12-25 | End: 2020-01-01

## 2019-12-31 NOTE — PROGRESS NOTES
[facility] [address] [facility] initial consultation note    Souleymane Arteaga is here today as a consult/referral by Saskia Berumen from [facility], fatigue and snoring, no previous studies. Chief complaint: Renate Pradhan is a 44 y. o.oldmale came for further evaluation regarding his fatigue, snoring ?sleep apnea  with referral from [facility]. He was brought to the clinic by Ms. Kimberlee Guzman- naun Co ordinator from [facility]. [place]:    Sleep/Wake schedule:  Usual time to go to bed during the work/regular day of week: 10:00 PM.  Usual time to wake up during the work//regular day of week: 6:00 AM.  Over the weekends his sleep schedule: . [x]phase delayed. He usually falls a sleep in less than: 3 to 4 hours  He takes naps: Yes. Number of naps per week:  3 to 4 times. During each nap he spends a total of: 2hours. The naps were reported as refreshing: No     Sleep Hygiene:  Is the temperature and evironment in his bed room is acceptable to him: Yes. He watches Television in his bed room: Yes. He read books, study, pay bills etc in the bed: Yes. Frequency He wake up during night/sleep: 3 to 4  Majority of nocturnal awakenings are for urination: Yes. Difficulty in falling back to sleep after nocturnal awakenings: Yes  . Do you drink coffee: No.  Do you drink caffeinated beverages i.e sodas: No.   Do you drink decaffeinated tea:Yes. 2 cup/s/glasse/s per day. Do you drink alcoholic beverages: No.     History of recreational drug use: No.     History of tobacco smoking:No.        Sleep apnea symptoms:  Noticed to have loud snoring:Yes. Noted by his nursing staff at nursing home. Witnessed apneas during sleep noticed: Yes. Noted by his nursing staff at nursing home  History of choking and gasping sensation at night time: Yes.   History of headaches in the morning:Yes. History of dry mouth in the morning: Yes. History of palpitations during night time/nocturnal awakenings: No.  History of sweating during night time/nocturnal awakenings: Yes    General:  History of head injury in the past: No.    History of seizures: No.   Rest less legs syndrome symptoms:NO  History suggestive of periodic limb movements during sleep: NO  History suggestive of hypnagogic hallucinations: NO  History suggestive of hypnopompic hallucinations: NO  History suggestive of sleep talking:Yes  History suggestive of sleep walking:NO  History suggestive of bruxism: No.   History suggestive of cataplexy: NO  History suggestive of sleep paralysis: NO    Family history of sleep disorders:  Family history of obstructive sleep apnea: NO.  Family history of Narcolepsy: NO.  Family history of Rest less legs syndrome : NO.    History regarding old sleep studies:  Prior history of sleep study: No.  Using CPAP device: No.  Currently using home Oxygen: NO.      Patient considerations:  Is the patient is ambulatory: Yes  Patient is currently using: He uses Walker to move around to go to rest room.   Para/Quadriplegic: NO  Hearing deficit : NO  Claustrophobic: NO  MDD : NO  Blind: NO  Incontinent: NO  Para/Quadraplegi: NO.   Need transportation to and from Sleep Center:NO      Social History:  Social History     Tobacco Use    Smoking status: Never Smoker    Smokeless tobacco: Never Used   Substance Use Topics    Alcohol use: No     Comment: hx of abuse    Drug use: No   .  He is currently working: No.   [x]Disabled                          Past Medical History:   Diagnosis Date    Abnormal liver function     Alcohol abuse     hx of    Anemia     Arthropathy     acute inflammatory    Bone disease     Carpal tunnel syndrome on right     rt hand    Depression     Fatigue     Foot pain     bilateral    Hypertension     Hyperuricemia     Infection     ankle    Leukocytosis     DURAN (nonalcoholic steatohepatitis)     Obesity     morbid    KIARA (obstructive sleep apnea)     Sarcoidosis     Systemic inflammatory response syndrome (HCC)     Type II or unspecified type diabetes mellitus without mention of complication, not stated as uncontrolled        Past Surgical History:   Procedure Laterality Date    FOOT SURGERY Right     2018, R foot osteomyelitis    TRACHEOSTOMY      as a baby       Allergies   Allergen Reactions    Pcn [Penicillins]        Current Outpatient Medications   Medication Sig Dispense Refill    oxyCODONE-acetaminophen (PERCOCET) 5-325 MG per tablet Take 1 tablet by mouth every 4 hours as needed for Pain.  allopurinol (ZYLOPRIM) 300 MG tablet Take 1 tablet by mouth daily 30 tablet 0    vitamin D 25 MCG (1000 UT) CAPS Take by mouth      esomeprazole Magnesium (NEXIUM) 20 MG PACK Take 20 mg by mouth daily      vitamin C (ASCORBIC ACID) 500 MG tablet Take 500 mg by mouth daily      hydrALAZINE (APRESOLINE) 50 MG tablet Take 50 mg by mouth 3 times daily      Wound Dressings (MAXORB EX) Apply topically      polyethylene glycol (GLYCOLAX) powder Take 17 g by mouth daily      Multiple Vitamins-Minerals (THERAPEUTIC MULTIVITAMIN-MINERALS) tablet Take 1 tablet by mouth daily      Diaper Rash Products (PINXAV) OINT Apply topically      predniSONE (DELTASONE) 20 MG tablet Take 20 mg by mouth daily      Probiotic Product (SOBIA-BID PROBIOTIC PO) Take by mouth      ondansetron (ZOFRAN) 4 MG tablet Take 4 mg by mouth every 8 hours as needed for Nausea or Vomiting      diltiazem (CARDIZEM) 60 MG tablet Take 60 mg by mouth 2 times daily      apixaban (ELIQUIS) 5 MG TABS tablet Take by mouth 2 times daily      tamsulosin (FLOMAX) 0.4 MG capsule Take 0.4 mg by mouth daily      folic acid (FOLVITE) 1 MG tablet Take 1 mg by mouth daily      furosemide (LASIX) 40 MG tablet Take 40 mg by mouth daily      gabapentin (NEURONTIN) 400 MG capsule Take by mouth 2 times daily. room air at rest  BMI 44.43 kg/m²   Mallampati airway Class:IV  Neck Circumference:20.5 Inches  [place] sleepiness score 1/29/20:  ( On further questioning he gives a history of hypersomnia ( Excessive daytime sleepiness) with daytime sleepy attacks. No reported motor vehicle accidents due to his sleepiness). Sleep apnea Quality of Life Questionnaire Score: 39.      Physical Exam   Nursing note and vitals reviewed. Constitutional: Patient appears well built, morbidly obese and well nourished. No distress. Patient is oriented to person, place, and time. HENT:   Head: Normocephalic and atraumatic. Right Ear: External ear normal.   Left Ear: External ear normal.   Mouth/Throat: Oropharynx is clear and moist.  No oral thrush. Eyes: Conjunctivae are normal. Pupils are equal, round, and reactive to light. No scleral icterus. Neck: Neck supple. No JVD present. No tracheal deviation present. Cardiovascular: Normal rate, regular rhythm, normal heart sounds. No murmur heard. Pulmonary/Chest: Effort normal and breath sounds normal. No stridor. No respiratory distress. No wheezes. No rales. Patient exhibits no tenderness. Abdominal: Soft. Patient exhibits no distension. No tenderness. Musculoskeletal: Normal range of motion. Extremities: Patient exhibits no edema and no tenderness. Lymphadenopathy:  No cervical adenopathy. Neurological: Patient is alert and oriented to person, place, and time. Skin: Skin is warm and dry. Patient is not diaphoretic. Psychiatric: Patient  has a normal mood and affect. Patient behavior is normal.     Diagnostic Data:  None related sleep. Assessment:  -Snoring with witnessed apneas,frequent nocturnal awakenings and excessive daytime sleepiness to evaluate for obstructive sleep apnea. -Inadequate sleep hygiene.  -Hypersomnia ( Excessive daytime sleepiness) may be due to obstructive sleep apnea Vs Inadequate sleep hygiene Vs his current sedative meds.   -Hypertension

## 2020-01-02 LAB
ALBUMIN SERPL-MCNC: 3.5 G/DL
ALP BLD-CCNC: 94 U/L
ALT SERPL-CCNC: 34 U/L
ANION GAP SERPL CALCULATED.3IONS-SCNC: NORMAL MMOL/L
AST SERPL-CCNC: 26 U/L
BASOPHILS ABSOLUTE: ABNORMAL
BASOPHILS RELATIVE PERCENT: 0.5 %
BILIRUB SERPL-MCNC: 0.5 MG/DL (ref 0.1–1.4)
BUN BLDV-MCNC: 22 MG/DL
C-REACTIVE PROTEIN: 202.6
CALCIUM SERPL-MCNC: 9.8 MG/DL
CHLORIDE BLD-SCNC: 100 MMOL/L
CO2: 30 MMOL/L
CREAT SERPL-MCNC: 1.1 MG/DL
EOSINOPHILS ABSOLUTE: ABNORMAL
EOSINOPHILS RELATIVE PERCENT: 1.8 %
GFR CALCULATED: 71
GLUCOSE BLD-MCNC: 77 MG/DL
HCT VFR BLD CALC: 37 % (ref 41–53)
HEMOGLOBIN: 12.4 G/DL (ref 13.5–17.5)
LYMPHOCYTES ABSOLUTE: 1.1 /ΜL
LYMPHOCYTES RELATIVE PERCENT: 10.2 %
MCH RBC QN AUTO: 29.8 PG
MCHC RBC AUTO-ENTMCNC: 33.6 G/DL
MCV RBC AUTO: 88.8 FL
MONOCYTES ABSOLUTE: ABNORMAL
MONOCYTES RELATIVE PERCENT: 7.2 %
NEUTROPHILS ABSOLUTE: 8.8 /ΜL
NEUTROPHILS RELATIVE PERCENT: 80.3 %
PDW BLD-RTO: 17.8 %
PLATELET # BLD: 318 K/ΜL
PMV BLD AUTO: 8.6 FL
POTASSIUM SERPL-SCNC: 4.5 MMOL/L
RBC # BLD: 4.16 10^6/ΜL
SEDIMENTATION RATE, ERYTHROCYTE: 81
SODIUM BLD-SCNC: 143 MMOL/L
TOTAL PROTEIN: 6.5
WBC # BLD: 10.9 10^3/ML

## 2020-01-08 ENCOUNTER — TELEPHONE (OUTPATIENT)
Dept: PHYSICAL MEDICINE AND REHAB | Age: 50
End: 2020-01-08

## 2020-01-09 NOTE — TELEPHONE ENCOUNTER
Have not received labs to review.  Call to [facility] to have them send again and confirm fax number

## 2020-01-13 NOTE — TELEPHONE ENCOUNTER
- Recent gout flare could cause the increased ESR/CRP levels. - CBC, LFT's, Creatinine -- stable & reviewed w/ pt.    - Awaiting  Uric acid  Level to be faxed from nursing home

## 2020-01-14 ENCOUNTER — OUTSIDE SERVICES (OUTPATIENT)
Dept: PHYSICAL MEDICINE AND REHAB | Age: 50
End: 2020-01-14
Payer: MEDICARE

## 2020-01-14 PROCEDURE — 99309 SBSQ NF CARE MODERATE MDM 30: CPT | Performed by: PHYSICAL MEDICINE & REHABILITATION

## 2020-01-15 LAB
ALBUMIN SERPL-MCNC: 3.3 G/DL
ALP BLD-CCNC: 83 U/L
ALT SERPL-CCNC: 18 U/L
ANION GAP SERPL CALCULATED.3IONS-SCNC: NORMAL MMOL/L
AST SERPL-CCNC: 19 U/L
BILIRUB SERPL-MCNC: 0.2 MG/DL (ref 0.1–1.4)
BUN BLDV-MCNC: 17 MG/DL
CALCIUM SERPL-MCNC: 8.9 MG/DL
CHLORIDE BLD-SCNC: 102 MMOL/L
CO2: 29 MMOL/L
CREAT SERPL-MCNC: 0.9 MG/DL
GFR CALCULATED: 89
GLUCOSE BLD-MCNC: 77 MG/DL
POTASSIUM SERPL-SCNC: 4.8 MMOL/L
SODIUM BLD-SCNC: 142 MMOL/L
TOTAL PROTEIN: 6
URIC ACID: 6.9

## 2020-01-16 RX ORDER — ALLOPURINOL 300 MG/1
300 TABLET ORAL DAILY
Qty: 30 TABLET | Refills: 0 | Status: SHIPPED | OUTPATIENT
Start: 2020-01-16 | End: 2020-02-21

## 2020-01-16 RX ORDER — ALLOPURINOL 300 MG/1
300 TABLET ORAL DAILY
Qty: 30 TABLET | Refills: 0 | Status: SHIPPED | OUTPATIENT
Start: 2020-01-16 | End: 2020-01-16

## 2020-01-16 NOTE — Clinical Note
Please call pt nursing home and have allopurinol changed to 300mg daily  . Repeat blood testing needed in 2-3 weeks

## 2020-01-17 ENCOUNTER — TELEPHONE (OUTPATIENT)
Dept: RHEUMATOLOGY | Age: 50
End: 2020-01-17

## 2020-01-17 NOTE — TELEPHONE ENCOUNTER
Phoned [facility] and spoke with Benigno mayo for [employer]. Verbal order given for med change and lab orders.

## 2020-01-29 ENCOUNTER — OFFICE VISIT (OUTPATIENT)
Dept: RHEUMATOLOGY | Age: 50
End: 2020-01-29
Payer: MEDICARE

## 2020-01-29 ENCOUNTER — INITIAL CONSULT (OUTPATIENT)
Dept: PULMONOLOGY | Age: 50
End: 2020-01-29
Payer: MEDICARE

## 2020-01-29 VITALS
HEART RATE: 72 BPM | HEIGHT: 68 IN | SYSTOLIC BLOOD PRESSURE: 124 MMHG | DIASTOLIC BLOOD PRESSURE: 76 MMHG | WEIGHT: 292.2 LBS | OXYGEN SATURATION: 95 % | BODY MASS INDEX: 44.28 KG/M2

## 2020-01-29 VITALS
RESPIRATION RATE: 20 BRPM | OXYGEN SATURATION: 97 % | HEIGHT: 68 IN | WEIGHT: 292.11 LBS | DIASTOLIC BLOOD PRESSURE: 80 MMHG | SYSTOLIC BLOOD PRESSURE: 140 MMHG | BODY MASS INDEX: 44.27 KG/M2 | HEART RATE: 86 BPM

## 2020-01-29 PROCEDURE — 1036F TOBACCO NON-USER: CPT | Performed by: NURSE PRACTITIONER

## 2020-01-29 PROCEDURE — 99203 OFFICE O/P NEW LOW 30 MIN: CPT | Performed by: INTERNAL MEDICINE

## 2020-01-29 PROCEDURE — 3017F COLORECTAL CA SCREEN DOC REV: CPT | Performed by: INTERNAL MEDICINE

## 2020-01-29 PROCEDURE — G8417 CALC BMI ABV UP PARAM F/U: HCPCS | Performed by: NURSE PRACTITIONER

## 2020-01-29 PROCEDURE — G8427 DOCREV CUR MEDS BY ELIG CLIN: HCPCS | Performed by: INTERNAL MEDICINE

## 2020-01-29 PROCEDURE — 1036F TOBACCO NON-USER: CPT | Performed by: INTERNAL MEDICINE

## 2020-01-29 PROCEDURE — G8427 DOCREV CUR MEDS BY ELIG CLIN: HCPCS | Performed by: NURSE PRACTITIONER

## 2020-01-29 PROCEDURE — G8484 FLU IMMUNIZE NO ADMIN: HCPCS | Performed by: INTERNAL MEDICINE

## 2020-01-29 PROCEDURE — G8417 CALC BMI ABV UP PARAM F/U: HCPCS | Performed by: INTERNAL MEDICINE

## 2020-01-29 PROCEDURE — G8484 FLU IMMUNIZE NO ADMIN: HCPCS | Performed by: NURSE PRACTITIONER

## 2020-01-29 PROCEDURE — 99214 OFFICE O/P EST MOD 30 MIN: CPT | Performed by: NURSE PRACTITIONER

## 2020-01-29 PROCEDURE — 3017F COLORECTAL CA SCREEN DOC REV: CPT | Performed by: NURSE PRACTITIONER

## 2020-01-29 RX ORDER — OXYCODONE HYDROCHLORIDE AND ACETAMINOPHEN 5; 325 MG/1; MG/1
1 TABLET ORAL EVERY 4 HOURS PRN
COMMUNITY
End: 2020-03-28 | Stop reason: SDUPTHER

## 2020-01-29 ASSESSMENT — ENCOUNTER SYMPTOMS
DIARRHEA: 0
ABDOMINAL PAIN: 0
EYE ITCHING: 0
COUGH: 0
SHORTNESS OF BREATH: 0
TROUBLE SWALLOWING: 0
NAUSEA: 0
EYE PAIN: 0
BACK PAIN: 1
CONSTIPATION: 0

## 2020-01-29 NOTE — PATIENT INSTRUCTIONS
Recommendations/Plan:  - Schedule patient for nocturnal polysomnogram (Sleep study) with split night protocol at [facility] sleep lab to know the current status of sleep apnea and to get optimal pressure I.e CPAP or BiPAP to continue PAP therapy. Patient to follow with my clinic at [facility] sleep clinic in 6 to 8 weeks with CPAP download for further evaluation.  -He was advised to discuss with his Primary physician regarding discontinuing his current sedative medications I.e Percocet, Neurotine and Abilify due to his ongoing excessive daytime sleepiness and to optimize her rest of the medications to improve his excessive daytime sleepiness.  -I had a discussion with patient regarding avialable treatment options for his sleep disorder breathing including but not limited to CPAP titration in the sleep lab Vs.Dental appliance placement with referral to a local dentist Vs other available surgical options including Uvulopalatopharyngoplasty, maxillomandibular ostomy and tracheostomy as last option. At the end of discussion, he decided on CPAP as his treatment if he found to have obstructive sleep apnea at this time.  -He was educated to practice good sleep hygiene practices. He was provided with a good sleep hygiene hand out.  -Slick Solis was advised to make earlier appointment with my clinic if he develops any worsening of sleep symptoms. He verbalizes understanding.  -Slick Solis was advised to not to drive any motor vehicles or operate heavy equipment until his sleep symptoms are under good control. Mayuri Seo verbalizes understanding.  -He was advised to loose weight by controlling diet and doing exercise once cleared by his family physician/ nursing home physician.   - Mayuri Seo was educated about my impression and plan. He verbalizes understanding.

## 2020-01-29 NOTE — PROGRESS NOTES
swelling. Skin: Positive for rash (scales on face). Neurological: Positive for weakness (left leg) and numbness (left leg). Negative for dizziness and headaches. Psychiatric/Behavioral: Positive for sleep disturbance. The patient is not nervous/anxious. Memory issues, depression       PAST MEDICAL HISTORY      Past Medical History:   Diagnosis Date    Abnormal liver function     Alcohol abuse     hx of    Anemia     Arthropathy     acute inflammatory    Bone disease     Carpal tunnel syndrome on right     rt hand    Depression     Fatigue     Foot pain     bilateral    Hypertension     Hyperuricemia     Infection     ankle    Leukocytosis     DURAN (nonalcoholic steatohepatitis)     Obesity     morbid    KIARA (obstructive sleep apnea)     Sarcoidosis     Systemic inflammatory response syndrome (HCC)     Type II or unspecified type diabetes mellitus without mention of complication, not stated as uncontrolled        PAST SURGICAL HISTORY      Past Surgical History:   Procedure Laterality Date    FOOT SURGERY Right     2018, R foot osteomyelitis    TRACHEOSTOMY      as a baby       FAMILY HISTORY      Family History   Problem Relation Age of Onset    Heart Disease Mother         MI    Cancer Paternal Aunt         lung       SOCIAL HISTORY      Social History     Tobacco History     Smoking Status  Never Smoker    Smokeless Tobacco Use  Never Used          Alcohol History     Alcohol Use Status  No Comment  hx of abuse          Drug Use     Drug Use Status  No          Sexual Activity     Sexually Active  Not Asked                ALLERGIES     Allergies   Allergen Reactions    Pcn [Penicillins]        CURRENT MEDICATIONS      Current Outpatient Medications   Medication Sig Dispense Refill    oxyCODONE-acetaminophen (PERCOCET) 5-325 MG per tablet Take 1 tablet by mouth every 4 hours as needed for Pain.       allopurinol (ZYLOPRIM) 300 MG tablet Take 1 tablet by mouth daily 30 tablet 0  vitamin D 25 MCG (1000 UT) CAPS Take by mouth      esomeprazole Magnesium (NEXIUM) 20 MG PACK Take 20 mg by mouth daily      vitamin C (ASCORBIC ACID) 500 MG tablet Take 500 mg by mouth daily      hydrALAZINE (APRESOLINE) 50 MG tablet Take 50 mg by mouth 3 times daily      Wound Dressings (MAXORB EX) Apply topically      polyethylene glycol (GLYCOLAX) powder Take 17 g by mouth daily      Multiple Vitamins-Minerals (THERAPEUTIC MULTIVITAMIN-MINERALS) tablet Take 1 tablet by mouth daily      Diaper Rash Products (PINXAV) OINT Apply topically      predniSONE (DELTASONE) 20 MG tablet Take 20 mg by mouth daily      Probiotic Product (SOBIA-BID PROBIOTIC PO) Take by mouth      ondansetron (ZOFRAN) 4 MG tablet Take 4 mg by mouth every 8 hours as needed for Nausea or Vomiting      diltiazem (CARDIZEM) 60 MG tablet Take 60 mg by mouth 2 times daily      apixaban (ELIQUIS) 5 MG TABS tablet Take by mouth 2 times daily      tamsulosin (FLOMAX) 0.4 MG capsule Take 0.4 mg by mouth daily      folic acid (FOLVITE) 1 MG tablet Take 1 mg by mouth daily      furosemide (LASIX) 40 MG tablet Take 40 mg by mouth daily      gabapentin (NEURONTIN) 400 MG capsule Take by mouth 2 times daily.  insulin lispro (HUMALOG) 100 UNIT/ML injection vial Inject into the skin 3 times daily (before meals)      sitaGLIPtan-metformin (JANUMET)  MG per tablet Take 1 tablet by mouth 2 times daily (with meals)      insulin glargine (LANTUS) 100 UNIT/ML injection vial Inject into the skin nightly      senna (SENOKOT) 8.6 MG tablet Take 1 tablet by mouth 2 times daily      diazepam (VALIUM) 5 MG tablet Take 5 mg by mouth every 6 hours as needed for Anxiety.  ibuprofen (ADVIL;MOTRIN) 800 MG tablet Take 1 tablet by mouth every 8 hours as needed for Pain 30 tablet 0    ARIPiprazole (ABILIFY PO) Take 1 tablet by mouth.         amlodipine (NORVASC) 10 MG tablet Take 5 mg by mouth daily       Citalopram Hydrobromide (CELEXA

## 2020-01-31 LAB
ALBUMIN SERPL-MCNC: 3.9 G/DL
ALP BLD-CCNC: 93 U/L
ALT SERPL-CCNC: 18 U/L
ANION GAP SERPL CALCULATED.3IONS-SCNC: NORMAL MMOL/L
AST SERPL-CCNC: 13 U/L
BASOPHILS ABSOLUTE: ABNORMAL
BASOPHILS RELATIVE PERCENT: 0.5 %
BILIRUB SERPL-MCNC: 0.3 MG/DL (ref 0.1–1.4)
BUN BLDV-MCNC: 16 MG/DL
CALCIUM SERPL-MCNC: 9.3 MG/DL
CHLORIDE BLD-SCNC: 101 MMOL/L
CO2: 35 MMOL/L
CREAT SERPL-MCNC: 0.9 MG/DL
EOSINOPHILS ABSOLUTE: 0.2 /ΜL
EOSINOPHILS RELATIVE PERCENT: 2.8 %
GFR CALCULATED: 108
GLUCOSE BLD-MCNC: 82 MG/DL
HCT VFR BLD CALC: 38.1 % (ref 41–53)
HEMOGLOBIN: 12.3 G/DL (ref 13.5–17.5)
LYMPHOCYTES ABSOLUTE: 1.1 /ΜL
LYMPHOCYTES RELATIVE PERCENT: 13.3 %
MCH RBC QN AUTO: 27.9 PG
MCHC RBC AUTO-ENTMCNC: 32.2 G/DL
MCV RBC AUTO: 86.8 FL
MONOCYTES ABSOLUTE: 0.6 /ΜL
MONOCYTES RELATIVE PERCENT: 6.7 %
NEUTROPHILS ABSOLUTE: 6.3 /ΜL
NEUTROPHILS RELATIVE PERCENT: 76.7 %
PDW BLD-RTO: 19.6 %
PLATELET # BLD: 248 K/ΜL
PMV BLD AUTO: 8.8 FL
POTASSIUM SERPL-SCNC: 4.2 MMOL/L
RBC # BLD: 4.39 10^6/ΜL
SODIUM BLD-SCNC: 143 MMOL/L
TOTAL PROTEIN: 6.3
WBC # BLD: 8.2 10^3/ML

## 2020-02-09 PROBLEM — S31.809A WOUND OF BUTTOCK: Status: ACTIVE | Noted: 2020-02-09

## 2020-02-09 PROBLEM — I50.32 CHRONIC DIASTOLIC CONGESTIVE HEART FAILURE (HCC): Status: RESOLVED | Noted: 2019-09-30 | Resolved: 2020-02-09

## 2020-02-09 PROBLEM — D64.9 NORMOCYTIC ANEMIA: Status: ACTIVE | Noted: 2020-02-09

## 2020-02-09 PROBLEM — R44.3 HALLUCINATIONS: Status: ACTIVE | Noted: 2020-02-09

## 2020-02-09 PROBLEM — Z86.79 HISTORY OF ATRIAL FIBRILLATION: Status: RESOLVED | Noted: 2019-10-29 | Resolved: 2020-02-09

## 2020-02-09 PROBLEM — R53.81 PHYSICAL DECONDITIONING: Status: ACTIVE | Noted: 2020-02-09

## 2020-02-09 NOTE — PROGRESS NOTES
injections. He's going to try injections 1st with pain management at [facility]    On percocet, is helping. Overall joint pain doing better  Mild anemia stable, no bleeding, melena or hematochezia  Continues to work with PT      DM2 on Janumet as well as basaglar and scheduled and SSI with humalog. Since admission sugars have been good, , no lows. This is despite having added prednisone.      HTN: on norvasc, dilt 60mg tid, and hydralazine. Was on chlorthalidone prior, but this was changed to lasix. BP have been <140/90. No CP/SOB     AFib/HF with preserved EF: not clear if parox or perm. On CCB and eliquis as well as lasix. He is current with cardio at [facility] now. Denies CP, SOB or palpitations.      Depression/mood disorder: follows with Anita. On celexa and abilify. Has occ hallucinations. None at present. Denies SI/HI. Feels moods are better.      GERD: on nexium. Helps with his GERD. Denies sxs or dysphagia     BPH: on flomax, works well for him, denies sig LUTS.     Vit d def: on supplementation     Buttocks wound PRIOR VISIT: following with Dr. Nba Canales for this. Saw him earlier today. Wound care plan in place. Unable to assess. Denies sig pain.      UPDATE PRIOR VISIT: wound present, improving, wound team following here. No longer seeing Dr. Nba Canales. UPDATE LAST VISIT: wound team on the case. Healing well. D/w wound physician today, he's happy with results thus far. On L buttocks. Wound was cultured and pt started on vanc for infection. Doing well with this. Pharm dosing, labs stable. UPDATE TODAY:   Has healed per pt and staff. Denies pain, doing well. I have reviewed patients past medical, surgical, social, and family history and have made updates where appropriate. Allergies and Medications were reviewed through the [facility] EMR.       Patient Active Problem List    Diagnosis Date Noted    Normocytic anemia 02/09/2020    Physical deconditioning 02/09/2020    Hallucinations 02/09/2020    Wound conjunctivae and eye lids without erythema  ENT: external ear and ear canal normal bilaterally, nose without deformity, nasal mucosa and turbinates normal without polyps, oropharynx normal, dentition is normal for age, no lip or gum lesions noted  Neck: supple and non-tender without mass, no thyromegaly or thyroid nodules, no cervical lymphadenopathy  Pulmonary/Chest: clear to auscultation bilaterally- no wheezes, rales or rhonchi, normal air movement, no respiratory distress or retractions  Cardiovascular: normal rate, regular rhythm, normal S1 and S2, no murmurs, rubs, clicks, or gallops, distal pulses intact  Abdomen: soft, non-tender, non-distended, bowel sounds physiologic,  no rebound or guarding, no masses or hernias noted. Liver and spleen without enlargement. Extremities: no cyanosis, clubbing or edema of the lower extremities  Musculoskeletal: No joint swelling or gross deformity   Neuro:  Alert, 2+ patellar reflexes b/l,  normal speech, no focal findings or movement disorder noted  Psych:  Normal affect without evidence of depression or anxiety, insight and judgement are appropriate, memory appears intact  Skin: warm and dry, no rash or erythema. L buttocks wound dressed. ASSESSMENT & PLAN  1. Idiopathic chronic gout of multiple sites without tophus    Doing well  con't allopurinol  Labs improving  con't low dose pred, wean per rheum  F/u rheum as planned    2. Primary osteoarthritis of both hips    con't prn percocet for now  Consult placed for pain management at [facility]  F/u ortho. 3. Rheumatoid arthritis of multiple sites with negative rheumatoid factor ([facility] [address]    Felt to not be a dx for him rheum  Is off DMARDS    4. Normocytic anemia    Stable  Mild  Likely d/t inflammatory condition  Monitor for now    5. Physical deconditioning    con't PT/OT    6.  Type 2 diabetes mellitus with diabetic polyneuropathy, with long-term current use of insulin (HCC)    Stable  At goal  con't basaglar, scheduled and SSI with humalog  con't janument  Will add lipitor 40mg qhs    7. Essential hypertension    At goal  con't norvasc, dilt, and hydralazine    8. Atrial fibrillation, unspecified type ([address]    Stable  con't rate control with dilt and eliquis  F/u cardio    9. Chronic heart failure with preserved ejection fraction (HCC)    Daily wts  Lasix  Stable   Cardio f.u    10. Recurrent major depressive disorder, in full remission ([address]    Stable  Doing well  con't abilify and celexa  F/u psych at [facility]    Antipsychotic/Antianxiety/Hypnotic/Psychotropic/Sedation/Antidepressant medications are continued at this time because discontinuation may result in adverse effects or return of concerning behaviors/symptoms. 11. Mood disorder ([address]    As above    12. Hallucinations    As above    13. Gastroesophageal reflux disease, esophagitis presence not specified    con't nexium    14. Benign prostatic hyperplasia without lower urinary tract symptoms    Doing well  con't flomax    15. Vitamin D deficiency    Doing well  con't vit D supplement    16.  Wound of buttock, unspecified laterality, sequela    Healed  F/u wound team prn      Future Appointments   Date Time Provider Anamika Solorio   2/21/2020  9:40 AM SANDRA Ceballos CNP Pulm Med [facility] [address] [place] [address]   2/26/2020  8:00 PM SCHEDULE, STR SLEEP TECH 03 [facility] 1 HOD   4/1/2020 12:30 PM SANDRA Lester CNP [facility] Rheum [facility] - [place]   4/20/2020  3:15 PM Katheryn Goldberg, MD SRPX Heart [facility] [address] [place] [address]   5/27/2020 11:15 AM Kiara Rodriguez DO [facility] Rheum [facility] - [address] [place] [address]       Electronically signed by Julee Phillips DO on 2/12/2020 at 1:21 PM

## 2020-02-10 ENCOUNTER — TELEPHONE (OUTPATIENT)
Dept: PHYSICAL MEDICINE AND REHAB | Age: 50
End: 2020-02-10

## 2020-02-10 NOTE — TELEPHONE ENCOUNTER
----- Message from Kate Alonso DO sent at 2/7/2020  1:38 PM EST -----  Please call the patient's care facility and have a uric acid lab completed as soon as possible.

## 2020-02-11 LAB
ALBUMIN SERPL-MCNC: 3.8 G/DL
ALP BLD-CCNC: 84 U/L
ALT SERPL-CCNC: 22 U/L
ANION GAP SERPL CALCULATED.3IONS-SCNC: NORMAL MMOL/L
AST SERPL-CCNC: 15 U/L
BILIRUB SERPL-MCNC: 0.3 MG/DL (ref 0.1–1.4)
BUN BLDV-MCNC: 16 MG/DL
C-REACTIVE PROTEIN: 22.9
CALCIUM SERPL-MCNC: 9.4 MG/DL
CHLORIDE BLD-SCNC: 101 MMOL/L
CO2: 31 MMOL/L
CREAT SERPL-MCNC: 0.9 MG/DL
GFR CALCULATED: 89
GLUCOSE BLD-MCNC: 57 MG/DL
POTASSIUM SERPL-SCNC: 4.4 MMOL/L
SEDIMENTATION RATE, ERYTHROCYTE: 16
SODIUM BLD-SCNC: 142 MMOL/L
TOTAL PROTEIN: 6.2
URIC ACID: 6.5

## 2020-02-12 ENCOUNTER — OUTSIDE SERVICES (OUTPATIENT)
Dept: FAMILY MEDICINE CLINIC | Age: 50
End: 2020-02-12
Payer: MEDICARE

## 2020-02-12 VITALS
HEIGHT: 68 IN | HEART RATE: 81 BPM | BODY MASS INDEX: 45.68 KG/M2 | RESPIRATION RATE: 20 BRPM | SYSTOLIC BLOOD PRESSURE: 118 MMHG | WEIGHT: 301.4 LBS | DIASTOLIC BLOOD PRESSURE: 82 MMHG | TEMPERATURE: 98.9 F

## 2020-02-12 PROCEDURE — 99309 SBSQ NF CARE MODERATE MDM 30: CPT | Performed by: FAMILY MEDICINE

## 2020-02-21 RX ORDER — ALLOPURINOL 300 MG/1
450 TABLET ORAL DAILY
Qty: 45 TABLET | Refills: 0 | Status: SHIPPED | OUTPATIENT
Start: 2020-02-21 | End: 2020-04-09

## 2020-02-21 NOTE — PROGRESS NOTES
Chronic tophaceous gout  Uric acid : 6.5 mg/dl  Increase allopurinol to 450mg daily   Repeat labs in 2-4 weeks.

## 2020-02-26 ENCOUNTER — HOSPITAL ENCOUNTER (OUTPATIENT)
Dept: SLEEP CENTER | Age: 50
Discharge: HOME | End: 2020-02-28
Payer: MEDICARE

## 2020-02-26 PROCEDURE — 95811 POLYSOM 6/>YRS CPAP 4/> PARM: CPT

## 2020-02-27 LAB — STATUS: NORMAL

## 2020-02-27 NOTE — PROGRESS NOTES
Title:  CPAP/BiLevel Titration's    Approved by:  Steven Marshall MD      Approval Date:  December, 2019 Next Review:  December, 2021     Responsible Party:  Wilmar Leos Institution/Entities Applies to:   Jose Dasilva Number:  None    Document Type:  Such as Guideline, Policy, Policy & Procedure, or Procedure, Instructions  Manual:  Policy and Procedures    Section: IV Policy Start Date: October, 7709           POLICY: Positive airway pressure device is used to treat patients with sleep related breathing disorders characterized by full or partial occlusion of the upper airway during sleep. A patient must have undergone polysomnography and diagnosed with obstructive sleep apnea. All individuals who record sleep studies must follow best practices for CPAP/bilevel/ASV titrations in order to attain the ideal pressure setting for their patients. Too low of pressure may cause patients to either be sub-optimally treated or to wake up in a panic. Too much pressure may cause the patient to experience bloating or mask leakage. Determining the appropriate pressure setting for each patient will lead to improved adherence and outcome. Titrations are not an exact science, and it is understood that technologists may need to make minor changes for individual patients. The procedures outlined below are meant to be a guideline and follow the spirit of the AASM clinical guidelines. PROCEDURE:    CPAP:    1. Review the patients pertinent medical al history, previous sleep study or studies to ass the severity of sleep disordered breathing. Review of pertinent information will help to attain a better titration. 2. Applications of electrodes, montages, filters, sensitivities and scoring will be performed according to the current version of the AASM Scoring Manual.   3. Prior to initiating study collect all appropriate PAP supplies  a. Tubing   b.  Humidifier (filled with have neuromuscular diseases. 8. If apneas or frequent hypopneas are present, inspiratory and expiratory pressure settings should be increased by 2 cm H20. If occasional hypopneas, snoring, or mask flow limitation are present inspiratory and expiratory pressures settings should be increased by 1 cm h20 and maintained for at least 5 minutes to determine if events improve or resolve. Pressure settings may need to be increased more quickly during REM sleep given the limited amount of REM during sleep and the need to treat events during this stage. 9. If a mask leak occurs, the tech should first fix the leakage before raising the pressure. Otherwise, the final pressure setting chosen for the patient may be too high. Once the mask leak has been fixed, decrease the pressure to the last setting where mouth breathing and/or mask leakage was not present, and then re-titrate as indicated. Make sure to document directly on the study the steps taken to resolve the leak and the type of masks used. Pressure setting usually do not need to be set as high with a nasal-mask than with a full-face mask. 10. The recording technologist should document directly on the study at least every 30 minutes. 11. If the patient takes a break from wearing the mask, do not decrease the CPAP pressure on attempted return to sleep unless the patient remains awake for 15 minutes or the patient specifically requests that the pressure be lowered. 12. Do not raise pressure for central apneas. If the patient develops central apneas, pressure setting may need to be lowered. If the patient has central apneas on bilevel, the use of spontaneous (ST) mode may be indicated. a. ST mode may only be used in 2 cases  i. An order for ST mode with a primary diagnosis of central sleep apnea  ii. During a titration if obstructive events are less than 5/ hour and centrals must be greater than 50% of total respiratory events.    13. Ensure that supine sleep has been seen on the chosen setting. Going above the chosen setting 1 or 2 cm H20 to show range may be helpful to ensure that the correct pressure has been established.

## 2020-02-29 NOTE — PROGRESS NOTES
[address] [place] [address]                               SLEEP STUDY REPORT    PATIENT NAME: Chris Swartz                :        1970  MED REC NO:   [id number]                           ROOM:  ACCOUNT NO:   [de-identified]                           ADMIT DATE: 2020  PROVIDER:     Master Mclean. MD Naima    DATE OF STUDY:  2020    SPLIT-NIGHT SLEEP STUDY REPORT    REFERRING PROVIDER:  Saskia Ballesteros CNP    The patient's height is 68 inches, weight is 292 pounds with a BMI of  34.4. HISTORY:  The patient is a 64-year-old gentleman, was initially  evaluated by me on 2020. The patient currently suffering with  hypersomnia. The patient also gave a history of snoring with witnessed  apneas. The patient had associated comorbidities including  hypertension, depression, and type 2 diabetes mellitus. The patient  scheduled for split-night sleep study to diagnose and treat sleep apnea. METHODS:  The patient underwent digital polysomnography in compliance  with the standards and specifications from the AASM Manual including the  simultaneous recording of 3 EEG channels (F4-M1, C4-M1, and O2-M1 with  back up electrodes F3-M2, C3-M2, and O1-M2), 2 EOG channels (E1-M2, and  E2-M1,), EMG (chin, left & right leg), EKG, Nonin pulse oximetry with   less than 2 second averaging time, body position, airflow recorded by  oral-nasal thermal sensor and nasal air pressure transducer, plus  respiratory effort recorded by calibrated respiratory inductance  plethysmography (RIP), flow volume loop, sound and video. Sleep staging  and scoring followed the standard put forth by the American Academy of  Sleep Medicine and utilized the 4A obstructive hypopnea event  desaturation of 4 percent or greater. INTERPRETATION:  This is a split-night sleep study which is going to be  dictated as part A and part B.     PART A:  The

## 2020-03-03 ENCOUNTER — TELEPHONE (OUTPATIENT)
Dept: SLEEP CENTER | Age: 50
End: 2020-03-03

## 2020-03-05 ENCOUNTER — TELEPHONE (OUTPATIENT)
Dept: RHEUMATOLOGY | Age: 50
End: 2020-03-05

## 2020-03-05 NOTE — TELEPHONE ENCOUNTER
----- Message from [facility], DO sent at 2/21/2020  2:46 PM EST -----  Please call the nursing home and inform them of the increased dosing of allopurinol and need for repeat labs (cbc, cmp, esr, crp ,uric acid) in 2-4 weeks.

## 2020-03-05 NOTE — TELEPHONE ENCOUNTER
Phoned and spoke with Pedrito Saxena at [facility] and informed. She voiced understanding.

## 2020-03-11 ENCOUNTER — OFFICE VISIT (OUTPATIENT)
Dept: PHYSICAL MEDICINE AND REHAB | Age: 50
End: 2020-03-11
Payer: MEDICARE

## 2020-03-11 ENCOUNTER — HOSPITAL ENCOUNTER (OUTPATIENT)
Age: 50
Discharge: HOME | End: 2020-03-11
Payer: MEDICARE

## 2020-03-11 ENCOUNTER — HOSPITAL ENCOUNTER (OUTPATIENT)
Dept: GENERAL RADIOLOGY | Age: 50
Discharge: HOME | End: 2020-03-11
Payer: MEDICARE

## 2020-03-11 ENCOUNTER — TELEPHONE (OUTPATIENT)
Dept: CARDIOLOGY CLINIC | Age: 50
End: 2020-03-11

## 2020-03-11 VITALS
DIASTOLIC BLOOD PRESSURE: 74 MMHG | HEIGHT: 68 IN | WEIGHT: 301.37 LBS | SYSTOLIC BLOOD PRESSURE: 138 MMHG | BODY MASS INDEX: 45.67 KG/M2

## 2020-03-11 PROCEDURE — 99215 OFFICE O/P EST HI 40 MIN: CPT | Performed by: NURSE PRACTITIONER

## 2020-03-11 PROCEDURE — G8484 FLU IMMUNIZE NO ADMIN: HCPCS | Performed by: NURSE PRACTITIONER

## 2020-03-11 PROCEDURE — G8417 CALC BMI ABV UP PARAM F/U: HCPCS | Performed by: NURSE PRACTITIONER

## 2020-03-11 PROCEDURE — 3017F COLORECTAL CA SCREEN DOC REV: CPT | Performed by: NURSE PRACTITIONER

## 2020-03-11 PROCEDURE — 72100 X-RAY EXAM L-S SPINE 2/3 VWS: CPT

## 2020-03-11 PROCEDURE — 1036F TOBACCO NON-USER: CPT | Performed by: NURSE PRACTITIONER

## 2020-03-11 PROCEDURE — G8427 DOCREV CUR MEDS BY ELIG CLIN: HCPCS | Performed by: NURSE PRACTITIONER

## 2020-03-11 RX ORDER — LIDOCAINE 50 MG/G
OINTMENT TOPICAL
Qty: 1 TUBE | Refills: 2 | Status: ON HOLD | OUTPATIENT
Start: 2020-03-11 | End: 2021-09-09

## 2020-03-11 ASSESSMENT — ENCOUNTER SYMPTOMS
GASTROINTESTINAL NEGATIVE: 1
EYES NEGATIVE: 1
COLOR CHANGE: 0
BACK PAIN: 1
RESPIRATORY NEGATIVE: 1

## 2020-03-11 NOTE — PROGRESS NOTES
[facility]  [address]  Dept: [phone]  Dept Fax: [phone]: [phone]    Visit Date: 3/11/2020    Purnima Ochoa is a 44 y.o. male who is referred for pain management evaluation and treatment per Dr. Muriel Iyer. CAGE and CAGE-AID Questions   1. In the last three months, have you felt you should cut down or stop drinking or using drugs? Yes []        No [x]     2. In the last three months, has anyone annoyed you or gotten on your nerves by telling you to cut down or stop drinking or using drugs? Yes []        No [x]     3. In the last three months, have you felt guilty or bad about how much you drink or use drugs? Yes []        No [x]     4. In the last three months, have you been waking up wanting to have an alcoholic drink or use drugs? Yes []        No [x]        Opioid Risk Tool:  Clinician Form       1. Family History of Substance Abuse: Female Male    Alcohol   []1   []3    Illegal drugs   []2   []3    Prescription drugs     []4   []4   2. Personal History of Substance Abuse:          Alcohol   []3   []3    Illegal drugs   []4   []4    Prescription drugs     []5   []5   3. Age (rajeev box if between 12 and 39):     []1   []1   4. History of Preadolescent Sexual Abuse:     []3   []0   5. Psychological Disease:      Attention deficit disorder, obsessive-compulsive disorder, bipolar, schizophrenia   []2   []2      Depression     []1   []1    Scoring Totals       Total Score  Low Risk  Moderate Risk  High Risk   Risk Category   0 - 3   4 - 7   8 or Above      Patient states symptoms interfere with:  A.  General Activity:  yes   B. Mood: yes    C. Walking Ability:   yes   D. Normal Work (Includes both work outside the home and housework):   yes    E.  Relations with Other People:  yes   F. Sleep:   yes   G.  Enjoyment of Life:  yes       HPI: ChiefComplaint: Low back pain, joint pain     HPI  Patient referred here from pcp Dr. Bailee Serrano for hip pain. Patient has complaints of lower back pain, hip pain, knee pain, and foot pain. Has a history of RA and gout and follows with Rheumatology. Pain has been chronic for over a year and patient feels that it is getting worse. Pain across lower back is constant stabbing aching pain, pain in bilateral feet is stabbing and aching, pain in hips and knees constant aching pain. Pain is constant and patient currently rtets pain at 8/10 at this times. At best pain decreases down to 5/10 relieved with Percocet. At worst pain increases to 8/10 aggravated with walking, moving, activity. Pain interferes with \"everything\" daily functioning, walking as he is currently in wheel chair, standing, sleep, mood. Patient resides at [facility] and is on Percocet 2 tabs BID prn, at [facility] was working therapies and does not feel it is helping pain, is on prednisone. Patient states that that he has ever had injections and has not seen pain management in the past.        The patient is allergic to pcn [penicillins]. PastMedical History  Juan Alberto Hdz  has a past medical history of Atrial fibrillation ([address] BPH (benign prostatic hyperplasia), Carpal tunnel syndrome on right, Chronic gout, DM2 (diabetes mellitus, type 2) ([address] GERD (gastroesophageal reflux disease), Heart failure with preserved ejection fraction ([address] History of alcohol abuse, History of osteomyelitis, Hypertension, essential, Hypogonadism, male, Major depression, Mood disorder ([address] Morbid obesity ([address] DURAN (nonalcoholic steatohepatitis), KIARA (obstructive sleep apnea), and Vitamin D deficiency. Past Surgical History  The patient  has a past surgical history that includes tracheostomy and Foot surgery (Right). Family History  This patient's family history includes Cancer in his paternal aunt; Heart Disease in his mother.     Social History  Juan Alberto Hdz  reports that he has never smoked. He has never used smokeless tobacco. He reports that he does not drink alcohol or use drugs.     Medications    Current Outpatient Medications:     lidocaine (XYLOCAINE) 5 % ointment, Apply topically as needed to painful areas on lower back, hips, knees, and feet, Disp: 1 Tube, Rfl: 2    allopurinol (ZYLOPRIM) 300 MG tablet, Take 1.5 tablets by mouth daily, Disp: 45 tablet, Rfl: 0    oxyCODONE-acetaminophen (PERCOCET) 5-325 MG per tablet, Take 1 tablet by mouth every 4 hours as needed for Pain., Disp: , Rfl:     vitamin D 25 MCG (1000 UT) CAPS, Take by mouth, Disp: , Rfl:     esomeprazole Magnesium (NEXIUM) 20 MG PACK, Take 20 mg by mouth daily, Disp: , Rfl:     vitamin C (ASCORBIC ACID) 500 MG tablet, Take 500 mg by mouth daily, Disp: , Rfl:     hydrALAZINE (APRESOLINE) 50 MG tablet, Take 50 mg by mouth 3 times daily, Disp: , Rfl:     Wound Dressings (MAXORB EX), Apply topically, Disp: , Rfl:     polyethylene glycol (GLYCOLAX) powder, Take 17 g by mouth daily, Disp: , Rfl:     Multiple Vitamins-Minerals (THERAPEUTIC MULTIVITAMIN-MINERALS) tablet, Take 1 tablet by mouth daily, Disp: , Rfl:     Diaper Rash Products (PINXAV) OINT, Apply topically, Disp: , Rfl:     predniSONE (DELTASONE) 20 MG tablet, Take 20 mg by mouth daily, Disp: , Rfl:     Probiotic Product (SOBIA-BID PROBIOTIC PO), Take by mouth, Disp: , Rfl:     ondansetron (ZOFRAN) 4 MG tablet, Take 4 mg by mouth every 8 hours as needed for Nausea or Vomiting, Disp: , Rfl:     diltiazem (CARDIZEM) 60 MG tablet, Take 60 mg by mouth 2 times daily, Disp: , Rfl:     apixaban (ELIQUIS) 5 MG TABS tablet, Take by mouth 2 times daily, Disp: , Rfl:     tamsulosin (FLOMAX) 0.4 MG capsule, Take 0.4 mg by mouth daily, Disp: , Rfl:     folic acid (FOLVITE) 1 MG tablet, Take 1 mg by mouth daily, Disp: , Rfl:     furosemide (LASIX) 40 MG tablet, Take 40 mg by mouth daily, Disp: , Rfl:     gabapentin (NEURONTIN) 400 MG capsule, Take by mouth 2 times daily. , Disp: , Rfl:     insulin lispro (HUMALOG) 100 UNIT/ML injection vial, Inject into the skin 3 times daily (before meals), Disp: , Rfl:     sitaGLIPtan-metformin (JANUMET)  MG per tablet, Take 1 tablet by mouth 2 times daily (with meals), Disp: , Rfl:     insulin glargine (LANTUS) 100 UNIT/ML injection vial, Inject into the skin nightly, Disp: , Rfl:     senna (SENOKOT) 8.6 MG tablet, Take 1 tablet by mouth 2 times daily, Disp: , Rfl:     diazepam (VALIUM) 5 MG tablet, Take 5 mg by mouth every 6 hours as needed for Anxiety. , Disp: , Rfl:     ibuprofen (ADVIL;MOTRIN) 800 MG tablet, Take 1 tablet by mouth every 8 hours as needed for Pain, Disp: 30 tablet, Rfl: 0    ARIPiprazole (ABILIFY PO), Take 1 tablet by mouth.  , Disp: , Rfl:     amlodipine (NORVASC) 10 MG tablet, Take 5 mg by mouth daily , Disp: , Rfl:     Citalopram Hydrobromide (CELEXA PO), Take 40 mg by mouth From [employer] , Disp: , Rfl:     Blood Glucose Monitoring Suppl (FREESTYLE LITE) ARTIE, Patient needs all supplies for qd testing. DX: 250.00, Disp: 1 Device, Rfl: 11    Subjective:      Review of Systems   Constitutional: Positive for activity change. HENT: Negative. Eyes: Negative. Respiratory: Negative. Cardiovascular: Positive for leg swelling. Gastrointestinal: Negative. Endocrine: Negative. Genitourinary: Negative. Musculoskeletal: Positive for arthralgias, back pain, gait problem, joint swelling, myalgias, neck pain and neck stiffness. Skin: Negative. Negative for color change. Neurological: Positive for weakness and numbness. Negative for headaches. Psychiatric/Behavioral: Positive for dysphoric mood and sleep disturbance. Objective:     Vitals:    03/11/20 1131   BP: 138/74   Weight: (!) 301 lb 5.9 oz (136.7 kg)   Height: 5' 7.99\" (1.727 m)       Physical Exam  Constitutional:       Appearance: Normal appearance.    HENT:      Head: Normocephalic and atraumatic. Right Ear: External ear normal.      Left Ear: External ear normal.      Nose: Nose normal.      Mouth/Throat:      Mouth: Mucous membranes are moist.      Pharynx: Oropharynx is clear. Eyes:      General:         Right eye: No discharge. Extraocular Movements: Extraocular movements intact. Conjunctiva/sclera: Conjunctivae normal.      Pupils: Pupils are equal, round, and reactive to light. Neck:      Musculoskeletal: Normal range of motion and neck supple. Cardiovascular:      Rate and Rhythm: Normal rate and regular rhythm. Pulses: Decreased pulses. Pulmonary:      Effort: Pulmonary effort is normal.      Breath sounds: Normal breath sounds. Abdominal:      General: Abdomen is flat. Bowel sounds are normal.   Musculoskeletal:         General: Tenderness present. Right shoulder: He exhibits decreased range of motion. Left shoulder: He exhibits decreased range of motion. Right wrist: He exhibits decreased range of motion. Right hip: He exhibits decreased range of motion, decreased strength, tenderness and bony tenderness. Left hip: He exhibits decreased range of motion, decreased strength, tenderness and bony tenderness. Right knee: He exhibits decreased range of motion and swelling. Tenderness found. Left knee: He exhibits decreased range of motion and swelling. Tenderness found. Right ankle: He exhibits decreased range of motion and swelling. Tenderness. Left ankle: He exhibits decreased range of motion and swelling. Tenderness. Cervical back: He exhibits decreased range of motion, tenderness and bony tenderness. Lumbar back: He exhibits decreased range of motion, tenderness, bony tenderness and pain. Right hand: He exhibits decreased range of motion. Right upper leg: He exhibits tenderness and bony tenderness. Right lower leg: He exhibits tenderness, bony tenderness and swelling.  2+ Pitting Edema patient understanding with this and that may not be pain free   Discussed possible weaning of medication dosing dependent on treatment/procedure results.  Discussed with patient about safe storage of medications at home   Discussed with patient about risks with procedure including infection, reaction to medication, increased pain, or bleeding.  Reviewed pcp notes in detail   [facility]    Plan bilateral hip steroid injection, Left HIP FIRST. Procedure and risks discussed in detail.  Patient is a diabetic    Will need to be cleared to be off blood thinners 5 days prior to procedure   Continue Percocet at [facility]. Ordered initial UDS today    Continue to follow with Rheumatology    Ordered Lidocaine ointment for pain     Previous Treatments tried:  · PT: Yes,  any benefit? No, how many weeks? At [facility]   · NSAIDs: Yes,  any benefit? On steroids   · Chiropractic: No  · Muscle relaxants: No  · Narcotics: Yes,  any benefit? No  · Spine surgeon consult: No  · Any Implants: No    Meds. Prescribed:   Orders Placed This Encounter   Medications    lidocaine (XYLOCAINE) 5 % ointment     Sig: Apply topically as needed to painful areas on lower back, hips, knees, and feet     Dispense:  1 Tube     Refill:  2       Return for bilateral hip steroid injection, Left HIP FIRST. , follow up after procedure. Time spent with patient was 60 minutes more than 50% was spent  Counseling/coordinated the patient'scare.     Electronically signed by SANDRA Phillips CNP on 3/11/2020 at 12:56 PM

## 2020-03-16 ENCOUNTER — OUTSIDE SERVICES (OUTPATIENT)
Dept: FAMILY MEDICINE CLINIC | Age: 50
End: 2020-03-16
Payer: MEDICARE

## 2020-03-16 PROCEDURE — 99309 SBSQ NF CARE MODERATE MDM 30: CPT | Performed by: NURSE PRACTITIONER

## 2020-03-17 NOTE — PROGRESS NOTES
NAME: Nathaly Lind  DATE: 3-   ROOM #: 14-1  CODE STATUS: FULL  REASON FOR VISIT: Follow up of chronic medical conditions. : [birth year]  Skilled Patient?: No    HPI: Pt seen and examined at bedside. History is partially obtained from chart review. Patient up on side of bed. Stating that he has been doing better. Walking unassisted around facility and is not using wheelchair as much. Pain in left hip, knee, ankle, right hip, and lower back. Stating that this is from arthritis and not his RA pain. Reports that pain from RA is very well managed. Has stabbing and aching pain in his left hip, which is the worst of the joints, pain wise. He saw the pain clinic and was told that they would start injections once he was cleared by cardiology to have his Eliquis held. He denies any additional complaints or concerns today. Expressed feeling very optimistic about his course of treatment. Pain management clinical notes cannot be scanned this visit, however in summary: Plan is to proceed with bilateral hip steroid injection, starting with the left hip. He is to be off blood thinners 5 days prior to injections. Continue with Prednisone, Percocet, and Lidocaine ointment for pain. PMHx: RA, DM2, AFib, HTN, leukocytosis, CP, abd pain, septic arthritis, hip pain, frontal lobe mass, morbid obesity, long-term opiate analgesic use, hepatic steatosis, esophagitis, neuropathy, weakness, pleurisy, auditory hallucinations.    PSHx: Tracheostomy, cardiac catheterization, Appendectomy, Foot surgery.    Social Hx: No tobacco or EtOH. Denies drug use.    FamHx: CAD    Allergies, medications, labs and radiology reports were reviewed through chart review. Patients past medical, surgical, social and family history have been reviewed and updates were made where appropriate.     Review of Systems  Positive responses are indicated with +    Constitutional:  Fever, Chills, Fatigue, Unexpected changes in weight  Eyes:  Eye discharge, Eye pain, Eye redness, Visual disturbances   HENT:  Ear pain, Tinnitus, Nosebleeds, Trouble swallowing  Cardiovascular:  Chest Pain, Palpitations  Respiratory:  Cough, Wheezing, Shortness of breath, Chest tightness, Apnea  Gastrointestinal:  Nausea, Vomiting, Diarrhea, Constipation, Heartburn, Blood in stool  Genitourinary:  Difficulty or painful urination, Flank pain, Change in frequency, Urgency  Skin:  Color change, Rash, Itching, Wound  Psychiatric:  Hallucinations, Anxiety, +Depression, Suicidal ideation  Hematological:  Enlarged glands, Easy bleeding, Easily bruising  Musculoskeletal:  +Joint pain, Back pain, +Gait problems, Joint swelling, +Myalgias  Neurological:  Dizziness, Headaches, Presyncope, Numbness, Seizures, Tremors  Allergy:  Environmental allergies, Food allergies  Endocrine:  Heat Intolerance, Cold Intolerance, Polydipsia, Polyphagia, Polyuria    PHYSICAL EXAM  Vital Signs: T, BP, P, RR, Wt  98, 127/79, 76, 18, 307#  General Appearance: well developed and well- nourished, obese male, in no acute distress  Head: normocephalic and atraumatic  Eyes: pupils equal, round, and reactive to light, conjunctivae and eye lids without erythema  ENT: external ear normal, nose without deformity, nasal mucosa and turbinates normal without polyps, oropharynx normal, dentition is normal for age, no lip or gum lesions noted  Pulmonary/Chest: No respiratory distress or retractions  Extremities: no cyanosis, clubbing or edema of the lower extremities  Musculoskeletal: No joint swelling or gross deformity. Neuro:  Alert, normal speech, no focal findings or movement disorder noted  Psych: Normal affect without evidence of depression or anxiety, insight and judgement are appropriate, memory appears intact  Skin: warm and dry. ASSESSMENT AND PLAN  1. RA: Improvement of pain. Stating this pain is not present and feels it is well controlled. Continue on Prednisone 7.5 mg daily, Percocet. POC per Dr Andrea Tyson.

## 2020-03-28 RX ORDER — OXYCODONE HYDROCHLORIDE AND ACETAMINOPHEN 5; 325 MG/1; MG/1
1 TABLET ORAL EVERY 4 HOURS PRN
Qty: 42 TABLET | Refills: 0 | Status: SHIPPED | OUTPATIENT
Start: 2020-03-28 | End: 2020-04-04

## 2020-03-28 NOTE — TELEPHONE ENCOUNTER
ECF pt  Needs RF sent to [facility]    ASSESSMENT & PLAN   Diagnosis Orders   1. Chronic gout without tophus, unspecified cause, unspecified site  oxyCODONE-acetaminophen (PERCOCET) 5-325 MG per tablet       OAARS reviewed and appropriate. Controlled Substances Monitoring: Periodic Controlled Substance Monitoring: Possible medication side effects, risk of tolerance/dependence & alternative treatments discussed., No signs of potential drug abuse or diversion identified., Obtaining appropriate analgesic effect of treatment.  Sherill Simmonds, DO)      Future Appointments   Date Time Provider [facility]   4/1/2020 12:30 PM SANDRA Hall CNP [facility] Rheum [facility] - [place]   4/7/2020  7:45 AM Sharolyn Holter, MD [facility] Heart [facility] [place]   4/9/2020 11:40 AM SANDRA Cuellar CNP Pulm Med [facility] [place]   5/27/2020 11:15 AM Sarahy Quezada DO [facility] Rheum [facility] [place]

## 2020-04-01 ENCOUNTER — VIRTUAL VISIT (OUTPATIENT)
Dept: RHEUMATOLOGY | Age: 50
End: 2020-04-01
Payer: MEDICARE

## 2020-04-01 PROCEDURE — 3017F COLORECTAL CA SCREEN DOC REV: CPT | Performed by: NURSE PRACTITIONER

## 2020-04-01 PROCEDURE — G8427 DOCREV CUR MEDS BY ELIG CLIN: HCPCS | Performed by: NURSE PRACTITIONER

## 2020-04-01 PROCEDURE — 99214 OFFICE O/P EST MOD 30 MIN: CPT | Performed by: NURSE PRACTITIONER

## 2020-04-01 NOTE — PROGRESS NOTES
nervous/anxious. Memory issues, depression       PAST MEDICAL HISTORY      Past Medical History:   Diagnosis Date    Atrial fibrillation (HCC)     BPH (benign prostatic hyperplasia)     Carpal tunnel syndrome on right     rt hand    Chronic gout     DM2 (diabetes mellitus, type 2) (HCC)     GERD (gastroesophageal reflux disease)     Heart failure with preserved ejection fraction (HCC)     History of alcohol abuse     History of osteomyelitis     Hx of R foot wound, osteomyelitis July 2018 requiring surgery and IV Vanco    Hypertension, essential     Hypogonadism, male     Major depression     [address] [facility] [address] [facility]     Morbid obesity ([facility]     DURAN (nonalcoholic steatohepatitis)     KIARA (obstructive sleep apnea)     Vitamin D deficiency        PAST SURGICAL HISTORY      Past Surgical History:   Procedure Laterality Date    FOOT SURGERY Right     2018, R foot osteomyelitis    TRACHEOSTOMY      as a baby       FAMILY HISTORY      Family History   Problem Relation Age of Onset    Heart Disease Mother         MI    Cancer Paternal Aunt         lung       SOCIAL HISTORY      Social History     Tobacco History     Smoking Status  Never Smoker    Smokeless Tobacco Use  Never Used          Alcohol History     Alcohol Use Status  No Comment  hx of abuse          Drug Use     Drug Use Status  No          Sexual Activity     Sexually Active  Not Asked                ALLERGIES     Allergies   Allergen Reactions    Pcn [Penicillins]        CURRENT MEDICATIONS      Current Outpatient Medications   Medication Sig Dispense Refill    oxyCODONE-acetaminophen (PERCOCET) 5-325 MG per tablet Take 1 tablet by mouth every 4 hours as needed for Pain for up to 7 days.  42 tablet 0    lidocaine (XYLOCAINE) 5 % ointment Apply topically as needed to painful areas on lower back, hips, knees, and feet 1 Tube 2    allopurinol (ZYLOPRIM) 300 MG tablet Take 1.5 tablets by mouth daily 45 tablet 0    vitamin D 25 MCG mouth From [facility] professional [facility]       Blood Glucose Monitoring Suppl (FREESTYLE LITE) ARTIE Patient needs all supplies for qd testing. DX: 250.00 1 Device 11     No current facility-administered medications for this visit. PHYSICAL EXAMINATION / OBJECTIVE   Objective: There were no vitals taken for this visit. General Appearance: General appearance: well-developed and well nourished  Head: normocephalic and atraumatic  Eyes: No gross abnormalities. , EOMI and Sclera nonicteric  Neck: normal ROM  Neurologic: speech normal, A&O x3  Skin: + rash- scales on face  Extremities: no cyanosis and no clubbing   Musculoskeletal: limited flexion and extension bilateral wrists.  Unable to make a complete fist       LABS    CBC  Lab Results   Component Value Date    WBC 8.2 01/31/2020    RBC 4.39 01/31/2020    RBC 4.55 04/15/2012    HGB 12.3 01/31/2020    HCT 38.1 01/31/2020    MCV 86.8 01/31/2020    MCH 27.9 01/31/2020    MCHC 32.2 01/31/2020    RDW 19.6 01/31/2020     01/31/2020       CMP  Lab Results   Component Value Date    CALCIUM 9.4 02/11/2020    LABALBU 3.8 02/11/2020    LABALBU 4.4 01/26/2012    PROT 7.2 10/15/2019     02/11/2020    K 4.4 02/11/2020    CO2 31 02/11/2020     02/11/2020    BUN 16 02/11/2020    CREATININE 0.9 02/11/2020    ALKPHOS 84 02/11/2020    ALT 22 02/11/2020    AST 15 02/11/2020       HgBA1c: No components found for: HGBA1C    No results found for: VITD25      Lab Results   Component Value Date    ANASCRN None Detected 04/27/2016     No results found for: SSA  No results found for: SSB  No results found for: ANTI-SMITH  Lab Results   Component Value Date    DSDNAAB SEE BELOW 04/27/2016      No results found for: ANTIRNP  No results found for: C3, C4  No results found for: CCPAB  Lab Results   Component Value Date    RF <10 01/23/2019       No components found for: CANCASCRN, APANCASCRN  Lab Results   Component Value Date    SEDRATE 16 02/11/2020     Lab Results Component Value Date    CRP 22.9 02/11/2020       RADIOLOGY:         ASSESSMENT/PLAN    Assessment   Plan     Polyarticular tophaceous gout  - left elbow aspiration crystal studies + monosodium urate   - continue allopurinol 450 mg daily- titrate to goal uric acid < 5 mg/dl- checking  with [facility] if there has been a recent uric acid level drawn   - continue prednisone 7.5 mg daily   - ? Need for pegloticase    H/O rheumatoid arthritis  H/o dermatomyositis  - serologies inconsistent with RA, normal CK/aldolase  - currently low suspicion for RA or DM based upon labs and exam   - Imuran stopped    Osteoarthritis of multiple joints   - Percocet PRN from PCP    Medication monitoring   - CBC, CMP, sed rate, CRP q 2 weeks- checking with [facility] to see if any  recent labs have been drawn, we do not have any results. No follow-ups on file. Electronically signed by SANDRA Delgado CNP on 4/1/2020 at 2:59 PM    New Prescriptions    No medications on file       Thank you for allowing me to participate in the care of this patient. Please call if there are any questions.

## 2020-04-06 LAB
ALBUMIN SERPL-MCNC: NORMAL G/DL
ALP BLD-CCNC: NORMAL U/L
ALT SERPL-CCNC: 23 U/L
ANION GAP SERPL CALCULATED.3IONS-SCNC: NORMAL MMOL/L
AST SERPL-CCNC: 16 U/L
BASOPHILS ABSOLUTE: 0.1 /ΜL
BASOPHILS RELATIVE PERCENT: 0.5 %
BILIRUB SERPL-MCNC: NORMAL MG/DL
BUN BLDV-MCNC: 19 MG/DL
C-REACTIVE PROTEIN: 12.8
CALCIUM SERPL-MCNC: 9.8 MG/DL
CHLORIDE BLD-SCNC: 101 MMOL/L
CO2: NORMAL
CREAT SERPL-MCNC: 1 MG/DL
EOSINOPHILS ABSOLUTE: 0.3 /ΜL
EOSINOPHILS RELATIVE PERCENT: 2.6 %
GFR CALCULATED: NORMAL
GLUCOSE BLD-MCNC: 63 MG/DL
HCT VFR BLD CALC: 40.3 % (ref 41–53)
HEMOGLOBIN: 12.8 G/DL (ref 13.5–17.5)
LYMPHOCYTES ABSOLUTE: 1.3 /ΜL
LYMPHOCYTES RELATIVE PERCENT: 12.8 %
MCH RBC QN AUTO: 27.9 PG
MCHC RBC AUTO-ENTMCNC: 31.9 G/DL
MCV RBC AUTO: 87.4 FL
MONOCYTES ABSOLUTE: 0.7 /ΜL
MONOCYTES RELATIVE PERCENT: 6.8 %
NEUTROPHILS ABSOLUTE: 7.7 /ΜL
NEUTROPHILS RELATIVE PERCENT: 77.3 %
PDW BLD-RTO: 19.9 %
PLATELET # BLD: 251 K/ΜL
PMV BLD AUTO: 9.1 FL
POTASSIUM SERPL-SCNC: 4.3 MMOL/L
RBC # BLD: 4.61 10^6/ΜL
SEDIMENTATION RATE, ERYTHROCYTE: 9
SODIUM BLD-SCNC: 142 MMOL/L
TOTAL PROTEIN: NORMAL
URIC ACID: 5.2
WBC # BLD: 10 10^3/ML

## 2020-04-07 RX ORDER — OXYCODONE HYDROCHLORIDE AND ACETAMINOPHEN 5; 325 MG/1; MG/1
1 TABLET ORAL EVERY 4 HOURS PRN
Qty: 180 TABLET | Refills: 0 | Status: SHIPPED | OUTPATIENT
Start: 2020-04-07 | End: 2020-05-07

## 2020-04-09 RX ORDER — ALLOPURINOL 300 MG/1
300 TABLET ORAL DAILY
Qty: 90 TABLET | Refills: 0 | Status: SHIPPED | OUTPATIENT
Start: 2020-04-09 | End: 2022-05-31 | Stop reason: SDUPTHER

## 2020-04-09 RX ORDER — ALLOPURINOL 100 MG/1
200 TABLET ORAL DAILY
Qty: 60 TABLET | Refills: 0 | Status: ON HOLD | OUTPATIENT
Start: 2020-04-09 | End: 2020-11-06 | Stop reason: HOSPADM

## 2020-04-09 NOTE — RESULT ENCOUNTER NOTE
Uric acid near goal of 5 mg/dL. Currently 5.2 mg/dL. Continued active inflammation based upon C-reactive protein levels. Polyarticular gout with tophi: Increase allopurinol to 500 mg daily. Medication monitoring repeat blood testing in 4 weeks.

## 2020-04-09 NOTE — PROGRESS NOTES
Visit Diagnoses and Associated Orders     Chronic tophaceous gout    -  Primary    CBC Auto Differential [27887 Custom]   - Future Order    Comprehensive Metabolic Panel [42210 Custom]   - Future Order    Uric Acid [19124 Custom]   - Future Order    C-Reactive Protein [45407 Custom]   - Future Order    Sedimentation Rate [48071 Custom]   - Future Order    allopurinol (ZYLOPRIM) 300 MG tablet [311]      allopurinol (ZYLOPRIM) 100 MG tablet [310]           Medication monitoring encounter        CBC Auto Differential [30025 Custom]   - Future Order    Comprehensive Metabolic Panel [[id number] Custom]   - Future Order    Uric Acid [48887 Custom]   - Future Order    C-Reactive Protein [30332 Custom]   - Future Order    Sedimentation Rate [42653 Custom]   - Future Order    allopurinol (ZYLOPRIM) 300 MG tablet [311]           Polyarthralgia             H/O: gout        allopurinol (ZYLOPRIM) 300 MG tablet [311]      allopurinol (ZYLOPRIM) 100 MG tablet [310]           ORDERS WITHOUT AN ASSOCIATED DIAGNOSIS    CBC Auto Differential [56480 Custom]      Comprehensive Metabolic Panel [31169 Custom]      C-Reactive Protein [16868 Custom]      Sedimentation Rate [87739 Custom]      Uric Acid [74973 Custom]          Repeat labs in 4 weeks after increasing allopurinol to 500mg daily  Please send orders to the patient's nursing home

## 2020-04-10 ENCOUNTER — TELEPHONE (OUTPATIENT)
Dept: RHEUMATOLOGY | Age: 50
End: 2020-04-10

## 2020-04-10 NOTE — TELEPHONE ENCOUNTER
----- Message from Randy Streeter DO sent at 4/9/2020 12:14 PM EDT -----  Uric acid near goal of 5 mg/dL. Currently 5.2 mg/dL. Continued active inflammation based upon C-reactive protein levels. Polyarticular gout with tophi: Increase allopurinol to 500 mg daily. Medication monitoring repeat blood testing in 4 weeks.

## 2020-04-16 ENCOUNTER — OUTSIDE SERVICES (OUTPATIENT)
Dept: FAMILY MEDICINE CLINIC | Age: 50
End: 2020-04-16
Payer: MEDICARE

## 2020-04-16 VITALS
SYSTOLIC BLOOD PRESSURE: 142 MMHG | HEART RATE: 87 BPM | TEMPERATURE: 97.8 F | OXYGEN SATURATION: 94 % | DIASTOLIC BLOOD PRESSURE: 76 MMHG | WEIGHT: 307.2 LBS | RESPIRATION RATE: 18 BRPM | BODY MASS INDEX: 46.72 KG/M2

## 2020-04-16 PROCEDURE — 99309 SBSQ NF CARE MODERATE MDM 30: CPT | Performed by: NURSE PRACTITIONER

## 2020-04-16 ASSESSMENT — ENCOUNTER SYMPTOMS
RHINORRHEA: 0
NAUSEA: 0
SINUS PAIN: 1
CONSTIPATION: 0
VOMITING: 0
CHEST TIGHTNESS: 0
COUGH: 0
FACIAL SWELLING: 1
SORE THROAT: 0
TROUBLE SWALLOWING: 0
ABDOMINAL DISTENTION: 0
ABDOMINAL PAIN: 0
DIARRHEA: 0
SHORTNESS OF BREATH: 0

## 2020-04-16 NOTE — PROGRESS NOTES
Anirudh Kendall is a 44 y.o. male who presents today for his medical conditions/complaints as noted below. Chief Complaint   Patient presents with    1 Month Follow-Up           HPI:     Tyrone Galindo is seen today for his monthly chronic illness f/u. He states that he has been having left upper back tooth pain for 2 days. No redness noted at site of pain. Informed Tyrone Galindo that when a dentist is available he would be able to see, but due to the Covid crisis that may be a while. Orders given. He denies other pain. He has gained about 29 lbs in the year since his admission  Blood sugars under control. Bilat lower legs with 2+ pitting edema, informed that patient that her needed to get them elevated more. He was seen while sitting in bedside chair. Nursing denies other concerns. No cough, fevers, or shortness of breath. He is alert and oriented and states that he was to the dentist about 2 months ago for abscess on bottom right and had the tooth pulled.        Past Medical History:   Diagnosis Date    Atrial fibrillation (HCC)     BPH (benign prostatic hyperplasia)     Carpal tunnel syndrome on right     rt hand    Chronic gout     DM2 (diabetes mellitus, type 2) (HCC)     GERD (gastroesophageal reflux disease)     Heart failure with preserved ejection fraction (HCC)     History of alcohol abuse     History of osteomyelitis     Hx of R foot wound, osteomyelitis July 2018 requiring surgery and IV Vanco    Hypertension, essential     Hypogonadism, male     Major depression     Mood disorder ([address]     Morbid obesity ([address]     DURAN (nonalcoholic steatohepatitis)     KIARA (obstructive sleep apnea)     Vitamin D deficiency       Past Surgical History:   Procedure Laterality Date    FOOT SURGERY Right     2018, R foot osteomyelitis    TRACHEOSTOMY      as a baby       Family History   Problem Relation Age of Onset    Heart Disease Mother         MI    Cancer Paternal Aunt         lung Social History     Tobacco Use    Smoking status: Never Smoker    Smokeless tobacco: Never Used   Substance Use Topics    Alcohol use: No     Comment: hx of abuse      Allergies   Allergen Reactions    Pcn [Penicillins]        Health Maintenance   Topic Date Due    Pneumococcal 0-64 years Vaccine (1 of 1 - PPSV23) 10/24/1974    Diabetic foot exam  10/24/1978    Diabetic retinal exam  10/24/1978    Lipid screen  10/24/1978    HIV screen  10/24/1983    Diabetic microalbuminuria test  10/24/1986    Hepatitis B vaccine (1 of 3 - Risk 3-dose series) 10/24/1987    DTaP/Tdap/Td vaccine (1 - Tdap) 10/24/1987    A1C test (Diabetic or Prediabetic)  10/21/2012    Shingles Vaccine (1 of 2) 10/24/2018    Colon cancer screen colonoscopy  10/24/2018    Annual Wellness Visit (AWV)  06/23/2019    Flu vaccine (Season Ended) 09/01/2020    Potassium monitoring  04/06/2021    Creatinine monitoring  04/06/2021    Hepatitis A vaccine  Aged Out    Hib vaccine  Aged Out    Meningococcal (ACWY) vaccine  Aged Out       Subjective:      Review of Systems   Constitutional: Negative for activity change, appetite change and fever. HENT: Positive for facial swelling, mouth sores and sinus pain. Negative for congestion, ear pain, hearing loss, nosebleeds, postnasal drip, rhinorrhea, sneezing, sore throat and trouble swallowing. Eyes: Negative for visual disturbance. Respiratory: Negative for cough, chest tightness and shortness of breath. Cardiovascular: Positive for leg swelling. Negative for chest pain and palpitations. Gastrointestinal: Negative for abdominal distention, abdominal pain, constipation, diarrhea, nausea and vomiting. Endocrine: Negative for polydipsia and polyuria. Genitourinary: Negative for difficulty urinating, frequency and urgency. Musculoskeletal: Positive for arthralgias, gait problem, joint swelling and myalgias. Negative for neck pain.    Neurological: Positive for numbness (feet). Negative for dizziness, seizures, syncope, weakness, light-headedness and headaches. Psychiatric/Behavioral: Negative for agitation. The patient is not nervous/anxious. Objective:     Physical Exam  BP (!) 142/76   Pulse 87   Temp 97.8 °F (36.6 °C)   Resp 18   Wt (!) 307 lb 3.2 oz (139.3 kg)   SpO2 94%   BMI 46.72 kg/m²     Assessment/Plan      1. Heart failure with preserved ejection fraction, unspecified HF chronicity (HCC)   Chronic - stable for now - lungs clear - but does have some peripheral edema - continue meds and monitor closely for exacerbation   2. Atrial fibrillation, unspecified type ([facility])   Controlled rate - continue Eliquis and monitor   3. Hypertension, essential   Chronic - labile - today was elevated - has been ok - continue meds and monitor   4. KIARA (obstructive sleep apnea)    5. DURAN (nonalcoholic steatohepatitis)    6. Gastroesophageal reflux disease without esophagitis   Chronic - continue meds and monitor   7. Type 2 diabetes mellitus with other specified complication, with long-term current use of insulin ([facility])   Blood sugars stable - continue meds and monitor   8. Hypogonadism, male    5. Carpal tunnel syndrome on right    10. Benign prostatic hyperplasia, unspecified whether lower urinary tract symptoms present    11. Chronic gout without tophus, unspecified cause, unspecified site    12. Hallucinations - denies - continue meds and monitor   13. Normocytic anemia - recent CBC with slight anemia - up from prev. - follow labs   14. Mood disorder ([address]   Continue meds and monitor   15. Physical deconditioning - encourage activity   16. Morbid obesity ([address]   Has gained almost 29 lbs in year - encourage activity when RA not in flare   17. Vitamin D deficiency    18. Major depressive disorder, remission status unspecified, unspecified whether recurrent   Chronic - continue meds and monitor   19. History of osteomyelitis    20. History of alcohol abuse    21.     Tooth ache - no redness, no facial swelling, + facial pain, no foul odor   Magic mouthwash for now - continue to observe if s/s infection occur will  need antibiotic -     Consult Dentist to see when able      Patient seen and examined. History partially obtained by chart review and nursing notes. I have reviewed patient's past medical, surgical, social, and family history and have made updates where appropriate.   See facility EMR for updated medication list.       Electronically signed by SANDRA Mckeon CNP on 4/16/2020 at 10:53 AM

## 2020-04-23 ENCOUNTER — OUTSIDE SERVICES (OUTPATIENT)
Dept: FAMILY MEDICINE CLINIC | Age: 50
End: 2020-04-23
Payer: MEDICARE

## 2020-04-23 VITALS
BODY MASS INDEX: 46.72 KG/M2 | OXYGEN SATURATION: 95 % | WEIGHT: 307.2 LBS | RESPIRATION RATE: 18 BRPM | SYSTOLIC BLOOD PRESSURE: 114 MMHG | HEART RATE: 76 BPM | TEMPERATURE: 97.6 F | DIASTOLIC BLOOD PRESSURE: 79 MMHG

## 2020-04-23 PROCEDURE — 99309 SBSQ NF CARE MODERATE MDM 30: CPT | Performed by: NURSE PRACTITIONER

## 2020-04-23 ASSESSMENT — ENCOUNTER SYMPTOMS
RHINORRHEA: 0
SINUS PRESSURE: 0
WHEEZING: 0
DIARRHEA: 0
SHORTNESS OF BREATH: 0
ABDOMINAL DISTENTION: 0
COUGH: 0
VOMITING: 0
CONSTIPATION: 0
ABDOMINAL PAIN: 0
NAUSEA: 0
SORE THROAT: 0

## 2020-04-23 NOTE — PROGRESS NOTES
Diabetic microalbuminuria test  10/24/1986    Hepatitis B vaccine (1 of 3 - Risk 3-dose series) 10/24/1987    DTaP/Tdap/Td vaccine (1 - Tdap) 10/24/1987    A1C test (Diabetic or Prediabetic)  10/21/2012    Shingles Vaccine (1 of 2) 10/24/2018    Colon cancer screen colonoscopy  10/24/2018    Annual Wellness Visit (AWV)  06/23/2019    Flu vaccine (Season Ended) 09/01/2020    Potassium monitoring  04/06/2021    Creatinine monitoring  04/06/2021    Hepatitis A vaccine  Aged Out    Hib vaccine  Aged Out    Meningococcal (ACWY) vaccine  Aged Out       Subjective:      Review of Systems   Constitutional: Negative for activity change, appetite change and fever. HENT: Negative for congestion, rhinorrhea, sinus pressure and sore throat. Respiratory: Negative for cough, shortness of breath and wheezing. Cardiovascular: Positive for leg swelling. Negative for chest pain and palpitations. Gastrointestinal: Negative for abdominal distention, abdominal pain, constipation, diarrhea, nausea and vomiting. Genitourinary: Negative for difficulty urinating. Musculoskeletal: Positive for arthralgias, gait problem, joint swelling and myalgias. Neurological: Negative for seizures, syncope and headaches. Psychiatric/Behavioral: The patient is not nervous/anxious. Objective:     Physical Exam  Vitals signs and nursing note reviewed. Constitutional:       General: He is not in acute distress. Appearance: Normal appearance. He is obese. He is not diaphoretic. HENT:      Head: Normocephalic and atraumatic. Right Ear: External ear normal.      Left Ear: External ear normal.      Nose: Nose normal. No congestion or rhinorrhea. Mouth/Throat:      Mouth: Mucous membranes are moist.      Pharynx: No oropharyngeal exudate. Eyes:      General: No scleral icterus. Right eye: No discharge. Left eye: No discharge. Extraocular Movements: Extraocular movements intact.

## 2020-05-10 NOTE — PROGRESS NOTES
[facility] Progress Note    NAME: Anirudh Kendall  DATE: 05/15/20  ROOM #: 14-1  : 1970  REASON FOR VISIT: Follow-up (Regular Visit)  CODE STATUS: FULL CODE      TELEHEALTH EVALUATION -- Audio/Visual (During ULMLA-43 public health emergency)    Due to COVID 19 outbreak, patient's nursing home visit was converted to a virtual visit. Patient was agreed to proceed with a virtual visit via D[employer]. me  The risks and benefits of converting to a virtual visit were discussed in light of the current infectious disease epidemic. Patient also understood that insurance coverage and co-pays are up to their individual insurance plans. Services were provided through a video synchronous discussion virtually to substitute for in-person clinic visit    Location of patient: [facility]  Location of physician: Office    Identification confirmed by: Patient :    Pursuant to the emergency declaration under the [address] [place] [address], [facility] waiver authority and the [employer] Act, this Virtual  Visit was conducted, with patient's (and/or legal guardian's) consent, to reduce the patient's risk of exposure to COVID-19 and provide necessary medical care. Services were provided through a video synchronous discussion virtually to substitute for in-person nursing home visit. Due to this being a TeleHealth encounter, evaluation of certain organ systems is limited. History obtained from chart review, the patient and nursing staff. SUBJECTIVE:  HPI: Anirudh Kendall is a 44 y.o. male. Pt seen and examined at bedside. -PRIOR VISIT:  Pt admitted as direct admission from ER on . Unclear if proper protocol was followed for ER direct admission. Pt had been having uncontrolled RA pain and went to ER. Was felt would benefit from [facility] stay, apparently. Was previously admitted here for similar issues.    Is currently awaiting being seen by rheum, however, 1st apt is 15 OCT. Is on cancellation list.      Since admission:  Has done ok  Still having a lot of pain in hands, feet  And elbows d/t his RA  Was previously on plaquenil, methotrexate and Imuran. Currently just on imuran, not clear why the change was made an who made that change. Pt can't recall   Inflammatory markers high   Percocet helping some with pain  Rest of labs stable and appropriate, WBC improved, renal fxn back to WNL. No fevers, chills, night sweats or wt loss  No headaches or vision changes.      PRIOR VISIT:  On imuran, pt states his plaquenil, methotrexate were stopped d/t recurrent infection and ineffectiveness  Last wk was having inc pain and joint swelling  ESR/CRP quite high  Started on pred taper last wk, feeling much better. Joint pain sig improved. Tolerating well  Percocet working better now  No fevers  No HA's     UPDATE PRIOR VISIT:   Joint pain doing much better on pred taper  Esr/crp improved  On prn percocet  No fevers or headahces  Feeling better  Eating well  Renal fxn better  Good progress with PT     UPDATE LAST VISIT:   Seen by rheum  Felt pt doesn't have RA, does have gout  RA meds stopped  Allopurinol started  Being weaned off pred by rheum     Saw ortho the other day for OA hips. They discussed LISSETH vs injections. He's going to try injections 1st with pain management at [facility] [address]    On percocet, is helping. Overall joint pain doing better  Mild anemia stable, no bleeding, melena or hematochezia  Continues to work with PT    UPDATE TODAY:   Gout pain better  On allopurinol  Rheum is weaning prednisone yet    Saw ortho the other day for OA hips. They discussed LISSETH vs injections. He's going to try injections 1st with pain management at [address]. Apt upcoming. On percocet, is helping  Has apt to see pain management for back as well  Overall joint pain doing better       -DM2 on Janumet as well as basaglar and scheduled and SSI with humalog.  Since admission sugars have been good, , no lows. This is despite having added prednisone.      -HTN: on norvasc, dilt 60mg tid, and hydralazine. Was on chlorthalidone prior, but this was changed to lasix. BP have been <140/90. No CP/SOB     -AFib/HF with preserved EF: not clear if parox or perm. On CCB and eliquis as well as lasix. He is current with cardio at [facility] now. Denies CP, SOB or palpitations.      -Depression/mood disorder: follows with Anita. On celexa and abilify. Has occ hallucinations. None at present or in a while. Denies SI/HI. Feels moods are better.      -GERD: on nexium. Helps with his GERD. Denies sxs or dysphagia     -BPH: on flomax, works well for him, denies sig LUTS.    -Vit d def: on supplementation       I have reviewed patients past medical, surgical, social, and family history and have made updates where appropriate. Allergies and Medications were reviewed through the [facility] EMR.       Patient Active Problem List    Diagnosis Date Noted    Normocytic anemia 02/09/2020    Physical deconditioning 02/09/2020    Hallucinations 02/09/2020    Wound of buttock 02/09/2020    Atrial fibrillation (HCC)     BPH (benign prostatic hyperplasia)     Carpal tunnel syndrome on right      rt hand      Chronic gout     DM2 (diabetes mellitus, type 2) ([address]     Heart failure with preserved ejection fraction (HCC)     History of alcohol abuse     History of osteomyelitis      Hx of R foot wound, osteomyelitis July 2018 requiring surgery and IV Vanco      Hypertension, essential     Hypogonadism, male     Major depression     Morbid obesity ([address]     DURAN (nonalcoholic steatohepatitis)     KIARA (obstructive sleep apnea)     Vitamin D deficiency     Mood disorder (HCC)     GERD (gastroesophageal reflux disease)        Review of Systems  Positive responses are highlighted in bold    Constitutional:  Fever, Chills, Night Sweats, Fatigue, Unexpected changes in weight  HENT:  Ear pain, percocet for now  Consult placed for pain management at [facility], has apt for intervention  F/u ortho. 3. Rheumatoid arthritis of multiple sites with negative rheumatoid factor ([facility]    Felt to not be a dx for him per rheum  Is off DMARDS    4. Normocytic anemia    Stable  Mild  Likely d/t inflammatory condition  Monitor for now    5. Physical deconditioning    con't PT/OT    6. Type 2 diabetes mellitus with diabetic polyneuropathy, with long-term current use of insulin (HCC)    Stable  At goal  con't basaglar, scheduled and SSI with humalog  con't janument  On lipitor now as well    7. Essential hypertension    At goal  con't norvasc, dilt, and hydralazine    8. Atrial fibrillation, unspecified type ([address]    Stable  con't rate control with dilt and eliquis  F/u cardio    9. Chronic heart failure with preserved ejection fraction (HCC)    Daily wts  Lasix  Stable   Cardio f/u    10. Recurrent major depressive disorder, in full remission ([facility]    Stable  Doing well  con't abilify and celexa  F/u psych at [facility]    Antipsychotic/Antianxiety/Hypnotic/Psychotropic/Sedation/Antidepressant medications are continued at this time because discontinuation may result in adverse effects or return of concerning behaviors/symptoms. 11. Mood disorder ([address]    As above    12. Hallucinations    As above    13. Gastroesophageal reflux disease, esophagitis presence not specified    con't nexium    14. Benign prostatic hyperplasia without lower urinary tract symptoms    Doing well  con't flomax    15.  Vitamin D deficiency    Doing well  con't vit D supplement        Future Appointments   Date Time Provider Anamika Solorio   5/27/2020 11:20 AM Chaz Bingham DO SRPX Rheum P - [place] [address]   6/19/2020  9:40 AM SANDRA Gore - CNP Pulm Med P - [place]   6/26/2020  9:15 AM Addy Rodriguez MD [address] [facility] - [place]       Electronically signed by Nicky Khan DO on 5/15/2020 at 3:32 PM

## 2020-05-11 ENCOUNTER — OUTSIDE SERVICES (OUTPATIENT)
Dept: FAMILY MEDICINE CLINIC | Age: 50
End: 2020-05-11
Payer: MEDICARE

## 2020-05-11 PROCEDURE — 99308 SBSQ NF CARE LOW MDM 20: CPT | Performed by: NURSE PRACTITIONER

## 2020-05-11 NOTE — PROGRESS NOTES
Wound  Psychiatric:  Hallucinations, Anxiety, +Depression, Suicidal ideation  Hematological:  Enlarged glands, Easy bleeding, Easily bruising  Musculoskeletal:  +Joint pain, Back pain, +Gait problems, Joint swelling, +Myalgias  Neurological:  Dizziness, Headaches, Presyncope, Numbness, Seizures, Tremors  Allergy:  Environmental allergies, Food allergies  Endocrine:  Heat Intolerance, Cold Intolerance, Polydipsia, Polyphagia, Polyuria    PHYSICAL EXAM  Vital Signs: T, BP, P, RR, Wt  98, 122/80, 68, 16, 301#  General Appearance: well developed and well- nourished, obese male, in no acute distress  Head: normocephalic and atraumatic  Eyes: pupils equal, round, and reactive to light, conjunctivae and eye lids without erythema  ENT: external ear normal, nose without deformity, nasal mucosa and turbinates normal without polyps, oropharynx normal, dentition is normal for age, no lip or gum lesions noted  Pulmonary/Chest: No respiratory distress or retractions  Extremities: no cyanosis, clubbing or edema of the lower extremities  Musculoskeletal: No joint swelling or gross deformity. Neuro:  Alert, normal speech, no focal findings or movement disorder noted  Psych: Normal affect without evidence of depression or anxiety, insight and judgement are appropriate, memory appears intact  Skin: warm and dry. ASSESSMENT AND PLAN  1. KIARA w CPAP: Noncompliance due to HA and dry mouth/throat. Humidification is present. This provider called [facility] respiratory therapy and left a message to see if he can be switched to the nasal mask as opposed to the full facial mask. 2. RA/OA: Unchanged. Denies any worsening. Continue current medications     Antipsychotic/Antianxiety/Hypnotic/Psychotropic/Sedation/Antidepressant medications are continued at this time because discontinuation may result in adverse effects or return of concerning behaviors/symptoms.     Plan of care reviewed with Dr Gisela Waters, CNP

## 2020-05-13 ENCOUNTER — OUTSIDE SERVICES (OUTPATIENT)
Dept: FAMILY MEDICINE CLINIC | Age: 50
End: 2020-05-13
Payer: MEDICARE

## 2020-05-13 PROCEDURE — 99308 SBSQ NF CARE LOW MDM 20: CPT | Performed by: NURSE PRACTITIONER

## 2020-05-13 NOTE — PROGRESS NOTES
problems, Joint swelling, +Myalgias  Neurological:  Dizziness, +Headaches, Presyncope, Numbness, Seizures, Tremors  Allergy:  Environmental allergies, Food allergies  Endocrine:  Heat Intolerance, Cold Intolerance, Polydipsia, Polyphagia, Polyuria    PHYSICAL EXAM  Vital Signs: T, BP, P, RR, Wt  98, 132/86,  75, 16, 301#  General Appearance: well developed and well- nourished, obese male, in no acute distress  Head: normocephalic and atraumatic  Eyes: pupils equal, round, and reactive to light, conjunctivae and eye lids without erythema  ENT: external ear normal, nose without deformity, nasal mucosa and turbinates normal without polyps, oropharynx normal, dentition is normal for age, no lip or gum lesions noted  Pulmonary/Chest: No respiratory distress or retractions  Extremities: no cyanosis, clubbing or edema of the lower extremities  Musculoskeletal: No joint swelling or gross deformity. Neuro:  Alert, normal speech, no focal findings or movement disorder noted  Psych: Normal affect without evidence of depression or anxiety, insight and judgement are appropriate, memory appears intact  Skin: warm and dry. ASSESSMENT AND PLAN  1. KIARA w CPAP: Noncompliance due to HA and dry mouth/throat. This provider spoke with RT yesterday and had called nursing to pass on suggestion of patient wearing the mask during awake hours for a couple of hours while watching TV, or during naps, to help him adjust to wearing it. He states that he did this yesterday. Notes headaches after wearing at night and dry throat. Changed his setting for humidification to maximum at a level 5. Discussed plan to continue to practice wearing the mask during the day and to report continued headaches and dry throat/mouth after wearing and this will be discussed with RT in another week. Patient agreeable with plan.      Plan of care reviewed with Dr Ivory Chapman, CNP

## 2020-05-15 ENCOUNTER — VIRTUAL VISIT (OUTPATIENT)
Dept: FAMILY MEDICINE CLINIC | Age: 50
End: 2020-05-15
Payer: MEDICARE

## 2020-05-15 VITALS
BODY MASS INDEX: 45.65 KG/M2 | WEIGHT: 301.2 LBS | SYSTOLIC BLOOD PRESSURE: 138 MMHG | DIASTOLIC BLOOD PRESSURE: 84 MMHG | HEART RATE: 82 BPM | HEIGHT: 68 IN | TEMPERATURE: 98.4 F | RESPIRATION RATE: 16 BRPM

## 2020-05-15 PROCEDURE — 99309 SBSQ NF CARE MODERATE MDM 30: CPT | Performed by: FAMILY MEDICINE

## 2020-05-15 PROCEDURE — 3046F HEMOGLOBIN A1C LEVEL >9.0%: CPT | Performed by: FAMILY MEDICINE

## 2020-05-20 ENCOUNTER — OUTSIDE SERVICES (OUTPATIENT)
Dept: FAMILY MEDICINE CLINIC | Age: 50
End: 2020-05-20
Payer: MEDICARE

## 2020-05-20 ENCOUNTER — TELEPHONE (OUTPATIENT)
Dept: CARDIOLOGY CLINIC | Age: 50
End: 2020-05-20

## 2020-05-20 ENCOUNTER — HOSPITAL ENCOUNTER (EMERGENCY)
Age: 50
Discharge: HOME | End: 2020-05-20
Attending: EMERGENCY MEDICINE
Payer: MEDICARE

## 2020-05-20 ENCOUNTER — APPOINTMENT (OUTPATIENT)
Dept: GENERAL RADIOLOGY | Age: 50
End: 2020-05-20
Payer: MEDICARE

## 2020-05-20 VITALS
BODY MASS INDEX: 45.47 KG/M2 | RESPIRATION RATE: 17 BRPM | SYSTOLIC BLOOD PRESSURE: 133 MMHG | HEART RATE: 73 BPM | WEIGHT: 300 LBS | HEIGHT: 68 IN | TEMPERATURE: 98.5 F | OXYGEN SATURATION: 94 % | DIASTOLIC BLOOD PRESSURE: 73 MMHG

## 2020-05-20 LAB
ALBUMIN SERPL-MCNC: 4.2 G/DL (ref 3.5–5.1)
ALBUMIN SERPL-MCNC: 4.3 G/DL (ref 3.5–5.1)
ALP BLD-CCNC: 116 U/L (ref 38–126)
ALP BLD-CCNC: 118 U/L (ref 38–126)
ALT SERPL-CCNC: 34 U/L (ref 11–66)
ALT SERPL-CCNC: 35 U/L (ref 11–66)
ANION GAP SERPL CALCULATED.3IONS-SCNC: 14 MEQ/L (ref 8–16)
APTT: 36.6 SECONDS (ref 22–38)
AST SERPL-CCNC: 29 U/L (ref 5–40)
AST SERPL-CCNC: 29 U/L (ref 5–40)
BASOPHILS # BLD: 0.4 %
BASOPHILS ABSOLUTE: 0.1 THOU/MM3 (ref 0–0.1)
BILIRUB SERPL-MCNC: 0.4 MG/DL (ref 0.3–1.2)
BILIRUB SERPL-MCNC: 0.4 MG/DL (ref 0.3–1.2)
BILIRUBIN DIRECT: < 0.2 MG/DL (ref 0–0.3)
BUN BLDV-MCNC: 20 MG/DL (ref 7–22)
CALCIUM SERPL-MCNC: 10.2 MG/DL (ref 8.5–10.5)
CHLORIDE BLD-SCNC: 97 MEQ/L (ref 98–111)
CO2: 29 MEQ/L (ref 23–33)
CREAT SERPL-MCNC: 1.2 MG/DL (ref 0.4–1.2)
EKG ATRIAL RATE: 68 BPM
EKG P AXIS: 50 DEGREES
EKG P-R INTERVAL: 164 MS
EKG Q-T INTERVAL: 408 MS
EKG QRS DURATION: 82 MS
EKG QTC CALCULATION (BAZETT): 433 MS
EKG R AXIS: -17 DEGREES
EKG T AXIS: 29 DEGREES
EKG VENTRICULAR RATE: 68 BPM
EOSINOPHIL # BLD: 1.8 %
EOSINOPHILS ABSOLUTE: 0.3 THOU/MM3 (ref 0–0.4)
ERYTHROCYTE [DISTWIDTH] IN BLOOD BY AUTOMATED COUNT: 18 % (ref 11.5–14.5)
ERYTHROCYTE [DISTWIDTH] IN BLOOD BY AUTOMATED COUNT: 57.7 FL (ref 35–45)
GLUCOSE BLD-MCNC: 176 MG/DL (ref 70–108)
GLUCOSE BLD-MCNC: 176 MG/DL (ref 70–108)
HCT VFR BLD CALC: 42.4 % (ref 42–52)
HEMOGLOBIN: 13.3 GM/DL (ref 14–18)
IMMATURE GRANS (ABS): 0.07 THOU/MM3 (ref 0–0.07)
IMMATURE GRANULOCYTES: 0.5 %
INR BLD: 1.33 (ref 0.85–1.13)
LIPASE: 46.4 U/L (ref 5.6–51.3)
LYMPHOCYTES # BLD: 7.1 %
LYMPHOCYTES ABSOLUTE: 1 THOU/MM3 (ref 1–4.8)
MCH RBC QN AUTO: 28.2 PG (ref 26–33)
MCHC RBC AUTO-ENTMCNC: 31.4 GM/DL (ref 32.2–35.5)
MCV RBC AUTO: 89.8 FL (ref 80–94)
MONOCYTES # BLD: 5.4 %
MONOCYTES ABSOLUTE: 0.8 THOU/MM3 (ref 0.4–1.3)
NUCLEATED RED BLOOD CELLS: 0 /100 WBC
OSMOLALITY CALCULATION: 286.3 MOSMOL/KG (ref 275–300)
PLATELET # BLD: 255 THOU/MM3 (ref 130–400)
PMV BLD AUTO: 10.9 FL (ref 9.4–12.4)
POTASSIUM REFLEX MAGNESIUM: 4.3 MEQ/L (ref 3.5–5.2)
PRO-BNP: 10 PG/ML (ref 0–900)
RBC # BLD: 4.72 MILL/MM3 (ref 4.7–6.1)
SEG NEUTROPHILS: 84.8 %
SEGMENTED NEUTROPHILS ABSOLUTE COUNT: 12 THOU/MM3 (ref 1.8–7.7)
SODIUM BLD-SCNC: 140 MEQ/L (ref 135–145)
TOTAL PROTEIN: 7.1 G/DL (ref 6.1–8)
TOTAL PROTEIN: 7.1 G/DL (ref 6.1–8)
TROPONIN T: < 0.01 NG/ML
TROPONIN T: < 0.01 NG/ML
WBC # BLD: 14.1 THOU/MM3 (ref 4.8–10.8)

## 2020-05-20 PROCEDURE — 93010 ELECTROCARDIOGRAM REPORT: CPT | Performed by: INTERNAL MEDICINE

## 2020-05-20 PROCEDURE — 99310 SBSQ NF CARE HIGH MDM 45: CPT | Performed by: NURSE PRACTITIONER

## 2020-05-20 PROCEDURE — 99285 EMERGENCY DEPT VISIT HI MDM: CPT

## 2020-05-20 PROCEDURE — 82948 REAGENT STRIP/BLOOD GLUCOSE: CPT

## 2020-05-20 PROCEDURE — 80053 COMPREHEN METABOLIC PANEL: CPT

## 2020-05-20 PROCEDURE — 93005 ELECTROCARDIOGRAM TRACING: CPT | Performed by: EMERGENCY MEDICINE

## 2020-05-20 PROCEDURE — 36415 COLL VENOUS BLD VENIPUNCTURE: CPT

## 2020-05-20 PROCEDURE — 83880 ASSAY OF NATRIURETIC PEPTIDE: CPT

## 2020-05-20 PROCEDURE — 84484 ASSAY OF TROPONIN QUANT: CPT

## 2020-05-20 PROCEDURE — 71045 X-RAY EXAM CHEST 1 VIEW: CPT

## 2020-05-20 PROCEDURE — 85610 PROTHROMBIN TIME: CPT

## 2020-05-20 PROCEDURE — 85730 THROMBOPLASTIN TIME PARTIAL: CPT

## 2020-05-20 PROCEDURE — 85025 COMPLETE CBC W/AUTO DIFF WBC: CPT

## 2020-05-20 PROCEDURE — 83690 ASSAY OF LIPASE: CPT

## 2020-05-20 ASSESSMENT — ENCOUNTER SYMPTOMS
BACK PAIN: 0
NAUSEA: 0
SORE THROAT: 0
RHINORRHEA: 0
VOMITING: 0
BLOOD IN STOOL: 0
COUGH: 0
DIARRHEA: 0
SHORTNESS OF BREATH: 0
CONSTIPATION: 0
ABDOMINAL PAIN: 0

## 2020-05-20 ASSESSMENT — HEART SCORE: ECG: 0

## 2020-05-20 ASSESSMENT — PAIN DESCRIPTION - PAIN TYPE: TYPE: ACUTE PAIN

## 2020-05-20 ASSESSMENT — PAIN DESCRIPTION - DESCRIPTORS: DESCRIPTORS: SHARP

## 2020-05-20 ASSESSMENT — PAIN SCALES - GENERAL: PAINLEVEL_OUTOF10: 3

## 2020-05-20 ASSESSMENT — PAIN DESCRIPTION - LOCATION: LOCATION: CHEST

## 2020-05-20 ASSESSMENT — PAIN DESCRIPTION - FREQUENCY: FREQUENCY: CONTINUOUS

## 2020-05-20 NOTE — ED NOTES
Bed: 023A  Expected date: 5/20/20  Expected time: 9:31 AM  Means of arrival: [facility]  Comments:     Roosevelt Dueñas RN  05/20/20 [id number]

## 2020-05-20 NOTE — ED PROVIDER NOTES
Severity: 3/10    REVIEW OF SYSTEMS     Review of Systems   Constitutional: Negative for chills, diaphoresis and fever. HENT: Negative for congestion, ear pain, rhinorrhea and sore throat. Eyes: Negative for visual disturbance. Respiratory: Negative for cough and shortness of breath. Cardiovascular: Positive for chest pain (left, right, and left sided below nipple) and leg swelling (bilateral, chronic and at baseline). Gastrointestinal: Negative for abdominal pain, blood in stool, constipation, diarrhea, nausea and vomiting. Genitourinary: Negative for decreased urine volume, difficulty urinating, dysuria, frequency, hematuria and urgency. Musculoskeletal: Negative for arthralgias, back pain, myalgias and neck pain. Skin: Negative for rash. Neurological: Negative for dizziness, syncope, weakness, light-headedness, numbness and headaches. Hematological: Negative for adenopathy. Psychiatric/Behavioral: Negative for confusion. All other systems reviewed and are negative. PAST MEDICAL HISTORY    has a past medical history of Atrial fibrillation ([address] BPH (benign prostatic hyperplasia), Carpal tunnel syndrome on right, Chronic gout, DM2 (diabetes mellitus, type 2) ([address] GERD (gastroesophageal reflux disease), Heart failure with preserved ejection fraction ([address] History of alcohol abuse, History of osteomyelitis, Hypertension, essential, Hypogonadism, male, Major depression, Mood disorder ([address] Morbid obesity ([address] DURAN (nonalcoholic steatohepatitis), KIARA (obstructive sleep apnea), and Vitamin D deficiency. SURGICAL HISTORY      has a past surgical history that includes tracheostomy and Foot surgery (Right).     CURRENT MEDICATIONS       Discharge Medication List as of 5/20/2020  1:16 PM      CONTINUE these medications which have NOT CHANGED    Details   !! allopurinol (ZYLOPRIM) 300 MG tablet Take 1 tablet by mouth daily, Disp-90 tablet, R-0Normal      !! allopurinol (ZYLOPRIM) 100 MG tablet Take 2 tablets by mouth daily, Disp-60 tablet, R-0Normal      lidocaine (XYLOCAINE) 5 % ointment Apply topically as needed to painful areas on lower back, hips, knees, and feet, Disp-1 Tube, R-2, Print      vitamin D 25 MCG (1000 UT) CAPS Take by mouthHistorical Med      esomeprazole Magnesium (NEXIUM) 20 MG PACK Take 20 mg by mouth dailyHistorical Med      vitamin C (ASCORBIC ACID) 500 MG tablet Take 500 mg by mouth dailyHistorical Med      hydrALAZINE (APRESOLINE) 50 MG tablet Take 50 mg by mouth 3 times dailyHistorical Med      Wound Dressings (MAXORB EX) Apply topicallyHistorical Med      polyethylene glycol (GLYCOLAX) powder Take 17 g by mouth dailyHistorical Med      Multiple Vitamins-Minerals (THERAPEUTIC MULTIVITAMIN-MINERALS) tablet Take 1 tablet by mouth dailyHistorical Med      Diaper Rash Products (PINXAV) OINT Apply topicallyHistorical Med      predniSONE (DELTASONE) 20 MG tablet Take 20 mg by mouth dailyHistorical Med      Probiotic Product (SOBIA-BID PROBIOTIC PO) Take by mouthHistorical Med      ondansetron (ZOFRAN) 4 MG tablet Take 4 mg by mouth every 8 hours as needed for Nausea or VomitingHistorical Med      diltiazem (CARDIZEM) 60 MG tablet Take 60 mg by mouth 2 times dailyHistorical Med      apixaban (ELIQUIS) 5 MG TABS tablet Take by mouth 2 times dailyHistorical Med      tamsulosin (FLOMAX) 0.4 MG capsule Take 0.4 mg by mouth dailyHistorical Med      folic acid (FOLVITE) 1 MG tablet Take 1 mg by mouth dailyHistorical Med      furosemide (LASIX) 40 MG tablet Take 40 mg by mouth dailyHistorical Med      gabapentin (NEURONTIN) 400 MG capsule Take by mouth 2 times daily. Historical Med      insulin lispro (HUMALOG) 100 UNIT/ML injection vial Inject into the skin 3 times daily (before meals)Historical Med      sitaGLIPtan-metformin (JANUMET)  MG per tablet Take 1 tablet by mouth 2 times daily (with meals)Historical Med      insulin glargine (LANTUS) 100 UNIT/ML injection vial ear canal clear without evidence of cerumen impaction or foreign body, TM's clear without erythema or bulging. Nares patent without drainage, septum appears midline. Moist mucus membranes, oropharynx clear without exudate, erythema, or mass. Uvula midline]  NECK: [Nontender and supple. No meningismus, no appreciated lymphadenopathy. Intact full range of motion. C-spine midline without vertebral tenderness. Trachea midline.]  CHEST: [Inspection normal, no lesions, equal rise. No crepitus or tenderness upon palpation.]  CARDIOVASCULAR: [Regular rate, rhythm, normal S1 and S2. No appreciated murmurs, rubs, or gallops. No pulse deficits appreciated. Intact distal perfusion. JVD not appreciated.]  PULMONARY: [Respiratory distress absent. Respiratory effort normal. Breath sounds clear to auscultation without rhonchi, rales, or wheezing. No accessory muscle use. No stridor]  ABDOMEN: [Inspection normal, without surgical scars. Soft, non-tender, non-distended, with normoactive bowel sounds. No palpable masses, rebound, or guarding]  BACK: [Intact ROM. No midline vertebral tenderness, step off, or crepitus. No CVA tenderness.]  MUSCULOSKELETAL: [Extremities nontender to palpation. No gross deformity or evidence of external trauma. Intact range of motion. Sensation intact. No clubbing, cyanosis. +1 non-pitting edema to mid-calf bilaterally. No calf tenderness, no palpable cord.]  SKIN: [Warm, dry. No jaundice, rash, urticaria, or petechiae]  NEUROLOGIC: [Alert and oriented x 3, GCS 15, normal mentation for age. Moves all four extremities. No gross sensory deficit.  Cerebellar function grossly normal.]  PSYCHIATRIC: [Normal mood and affect, thought process is clear and linear]       DIFFERENTIAL DIAGNOSIS:   Differential Dx Lists - I consider the following to be a partial list of the possible etiologies for the patient's symptoms and based on my clinical findings as well and are part of my medical decision making:    Chest NATRIURETIC PEPTIDE   HEPATIC FUNCTION PANEL   LIPASE   TROPONIN   APTT   TROPONIN   ANION GAP   OSMOLALITY       EMERGENCY DEPARTMENT COURSE:   Vitals:    Vitals:    05/20/20 0943 05/20/20 1048 05/20/20 1155 05/20/20 1251   BP:  133/79 123/79 133/73   Pulse:  59 63 73   Resp:  18 20 17   Temp:       TempSrc:       SpO2: 94% 95% 92% 94%   Weight:       Height:         0949: POCT glucose ordered. 8075: Labs and EKG ordered. 1000: Peripheral IV, more labs, and Chest XR ordered. 1002: More labs ordered. 1014: Chest XR showed No acute cardiopulmonary findings. 1035: Troponin ordered. The results of pertinent diagnostic studies, exam findings and clinical concerns were discussed with the patient. I explained that we do not have a definitive diagnosis for their symptoms at this time. It is possible there is an early developing process that will require recheck and further testing and possible treatment by their primary care physician or specialist. The patient is non-toxic and well appearing in the ED, and there is no focal neurologic deficit on exam. I feel they are appropriate for outpatient follow up with their primary care physician. Prescription medications for symptomatic care will be provided as needed. The patient was instructed to call their PCP in 1-2 days for a follow up appointment. They are to return to the ED if they experience an increase or change in their symptoms, persistent vomiting, bleeding, fever or inability to take oral fluids. They expressed understanding that follow-up is essential and agreed with this plan and rationale. The patient's and family members present questions were answered and they felt comfortable with discharge. Remained chest pain-free during his entire evaluation in the emergency department. He states repeatedly that he feels that his CPAP machine is to blame for his symptoms. He states several times that he does not like wearing a CPAP machine. Patient was encouraged to follow-up with his cardiologist and family doctor in 1 to 2 days. CRITICAL CARE:   None     CONSULTS:  None     PROCEDURES:  None    FINAL IMPRESSION      1. Chest pain, unspecified type          DISPOSITION/PLAN   Discharge     PATIENT REFERRED TO:  Mac Wallace  [phone]    Schedule an appointment as soon as possible for a visit       Nilo Crawley MD  [place] [address]  [phone]    Schedule an appointment as soon as possible for a visit         DISCHARGE MEDICATIONS:  Discharge Medication List as of 5/20/2020  1:16 PM          (Please note that portions ofthis note were completed with a voice recognition program.  Efforts were made to edit the dictations but occasionally words are mis-transcribed.)    Scribe:  Claudia Oro 5/20/20 9:59 AM EDT Scribing for and in the presence of @Kati Nunn MD@. Signed by: Tess Templeton, 05/20/20 2:35 PM    Provider:  I personally performed the services described in the documentation, reviewed and edited the documentation which was dictated to the scribe in my presence, and it accurately records my words and actions.     Jonathan aTy MD@ 5/20/20 2:35 PM        Jonathan Tay MD  05/20/20 2883

## 2020-05-20 NOTE — TELEPHONE ENCOUNTER
Patient  was seen in  the hospital on 5/20 for chest pains and is needing a follow up appointment asap.  Please advise [phone]

## 2020-05-20 NOTE — ED NOTES
Nurse danny from [facility] updated on plan of care     David Gan RN  05/20/20

## 2020-05-22 ENCOUNTER — TELEPHONE (OUTPATIENT)
Dept: CARDIOLOGY CLINIC | Age: 50
End: 2020-05-22

## 2020-05-22 ENCOUNTER — OFFICE VISIT (OUTPATIENT)
Dept: CARDIOLOGY CLINIC | Age: 50
End: 2020-05-22
Payer: MEDICARE

## 2020-05-22 VITALS
SYSTOLIC BLOOD PRESSURE: 134 MMHG | HEART RATE: 77 BPM | BODY MASS INDEX: 45.61 KG/M2 | HEIGHT: 68 IN | DIASTOLIC BLOOD PRESSURE: 76 MMHG

## 2020-05-22 PROBLEM — R07.89 CHEST WALL PAIN: Status: ACTIVE | Noted: 2020-05-22

## 2020-05-22 LAB — GFR SERPL CREATININE-BSD FRML MDRD: 64 ML/MIN/1.73M2

## 2020-05-22 PROCEDURE — 99214 OFFICE O/P EST MOD 30 MIN: CPT | Performed by: INTERNAL MEDICINE

## 2020-05-22 PROCEDURE — G8417 CALC BMI ABV UP PARAM F/U: HCPCS | Performed by: INTERNAL MEDICINE

## 2020-05-22 PROCEDURE — 3017F COLORECTAL CA SCREEN DOC REV: CPT | Performed by: INTERNAL MEDICINE

## 2020-05-22 PROCEDURE — G8427 DOCREV CUR MEDS BY ELIG CLIN: HCPCS | Performed by: INTERNAL MEDICINE

## 2020-05-22 PROCEDURE — 1036F TOBACCO NON-USER: CPT | Performed by: INTERNAL MEDICINE

## 2020-05-22 RX ORDER — TROLAMINE SALICYLATE 10 G/100G
CREAM TOPICAL
COMMUNITY
End: 2020-05-22 | Stop reason: SDUPTHER

## 2020-05-22 RX ORDER — NAPROXEN 500 MG/1
500 TABLET ORAL 2 TIMES DAILY WITH MEALS
Qty: 10 TABLET | Refills: 0 | Status: ON HOLD | OUTPATIENT
Start: 2020-05-22 | End: 2020-06-29

## 2020-05-22 RX ORDER — NAPROXEN 500 MG/1
500 TABLET ORAL 2 TIMES DAILY WITH MEALS
COMMUNITY
End: 2020-05-22 | Stop reason: SDUPTHER

## 2020-05-22 RX ORDER — PANTOPRAZOLE SODIUM 40 MG/1
40 TABLET, DELAYED RELEASE ORAL DAILY
Qty: 5 TABLET | Refills: 0 | Status: SHIPPED | OUTPATIENT
Start: 2020-05-22 | End: 2021-11-04 | Stop reason: DRUGHIGH

## 2020-05-22 RX ORDER — PANTOPRAZOLE SODIUM 40 MG/1
40 TABLET, DELAYED RELEASE ORAL DAILY
COMMUNITY
End: 2020-05-22 | Stop reason: SDUPTHER

## 2020-05-22 RX ORDER — TROLAMINE SALICYLATE 10 G/100G
CREAM TOPICAL PRN
Qty: 1 BOTTLE | Refills: 0 | Status: ON HOLD | OUTPATIENT
Start: 2020-05-22 | End: 2021-09-09

## 2020-05-22 NOTE — PROGRESS NOTES
facility-administered medications for this visit. Review of Systems -     General ROS: negative  Psychological ROS: negative  Hematological and Lymphatic ROS: No history of blood clots or bleeding disorder. Respiratory ROS: no cough,  or wheezing, the rest see HPI  Cardiovascular ROS: See HPI  Gastrointestinal ROS: negative  Genito-Urinary ROS: no dysuria, trouble voiding, or hematuria  Musculoskeletal ROS: negative  Neurological ROS: no TIA or stroke symptoms  Dermatological ROS: negative      Blood pressure 134/76, pulse 77, height 5' 8\" (1.727 m). Physical Examination:    General appearance - alert, well appearing, and in no distress  HEENT- Pink conjunctiva  , Non-icteri sclera,PERRLA  Mental status - alert, oriented to person, place, and time  Neck - supple, no significant adenopathy, no JVD, or carotid bruits  Chest - clear to auscultation, no wheezes, rales or rhonchi, symmetric air entry  Heart - normal rate, regular rhythm, normal S1, S2, no murmurs, rubs, clicks or gallops  Abdomen - soft, nontender, nondistended, no masses or organomegaly  JORDAN- no CVA or flank tenderness, no suprapubic tenderness  Neurological - alert, oriented, normal speech, no focal findings or movement disorder noted  Musculoskeletal/limbs - no joint tenderness, deformity or swelling   - peripheral pulses normal, no pedal edema, no clubbing or cyanosis  Skin - normal coloration and turgor, no rashes, no suspicious skin lesions noted  Psych- appropriate mood and affect    Lab  No results for input(s): CKTOTAL, CKMB, CKMBINDEX, TROPONINI in the last 72 hours.   CBC:   Lab Results   Component Value Date    WBC 14.1 05/20/2020    RBC 4.72 05/20/2020    RBC 4.55 04/15/2012    HGB 13.3 05/20/2020    HCT 42.4 05/20/2020    MCV 89.8 05/20/2020    MCH 28.2 05/20/2020    MCHC 31.4 05/20/2020    RDW 19.9 04/06/2020     05/20/2020    MPV 10.9 05/20/2020     BMP:    Lab Results   Component Value Date     05/20/2020    K 4.3 05/20/2020    CL 97 05/20/2020    CO2 29 05/20/2020    BUN 20 05/20/2020    LABALBU 4.2 05/20/2020    LABALBU 4.3 05/20/2020    LABALBU 4.4 01/26/2012    CREATININE 1.2 05/20/2020    CALCIUM 10.2 05/20/2020    GFRAA >60 01/23/2019    LABGLOM 64 05/20/2020    GLUCOSE 176 05/20/2020    GLUCOSE 113 06/24/2018     Hepatic Function Panel:    Lab Results   Component Value Date    ALKPHOS 116 05/20/2020    ALKPHOS 118 05/20/2020    ALT 34 05/20/2020    ALT 35 05/20/2020    AST 29 05/20/2020    AST 29 05/20/2020    PROT 7.1 05/20/2020    PROT 7.1 05/20/2020    BILITOT 0.4 05/20/2020    BILITOT 0.4 05/20/2020    BILIDIR <0.2 05/20/2020    LABALBU 4.2 05/20/2020    LABALBU 4.3 05/20/2020    LABALBU 4.4 01/26/2012     Magnesium:    Lab Results   Component Value Date    MG 1.9 06/24/2018     Warfarin PT/INR:  No components found for: PTPATWAR, PTINRWAR  HgBA1c:    Lab Results   Component Value Date    LABA1C 6.4 10/21/2011     FLP:  No results found for: TRIG, HDL, LDLCALC, LDLDIRECT, LABVLDL  TSH:  No results found for: TSH    EKG 9/30/19  Sinus  Rhythm   Low voltage in precordial leads.    -  Nonspecific T-abnormality. ABNORMAL   Normal sinus rhythm  Low voltage QRS, consider pulmonary disease, pericardial effusion, or normal variant  Nonspecific T wave abnormality  Abnormal ECG  When compared with ECG of 09-NOV-2011 22:01,  No significant change was found  Confirmed by Erwin Love MD, Evan Men ([id number]) on 5/20/2020 8:02:47    Assessment       Diagnosis Orders   1. Chronic diastolic congestive heart failure (HCC)     2. Chest wall pain with tenderness     3. Atrial fibrillation, unspecified type ([address]     4. Hypertension, essential           Plan     The current meds and labs reviewed    Chest pain pain with tenderness  aprecream  Naproxen for 5 days withn prilosec    Continue the current treatment and with constant vigilance to changes in symptoms and also any potential side effects.   Return for care or seek medical attention

## 2020-05-27 ENCOUNTER — OFFICE VISIT (OUTPATIENT)
Dept: RHEUMATOLOGY | Age: 50
End: 2020-05-27
Payer: MEDICARE

## 2020-05-27 VITALS
OXYGEN SATURATION: 93 % | HEART RATE: 78 BPM | HEIGHT: 68 IN | BODY MASS INDEX: 45.47 KG/M2 | SYSTOLIC BLOOD PRESSURE: 130 MMHG | DIASTOLIC BLOOD PRESSURE: 78 MMHG | WEIGHT: 300.05 LBS

## 2020-05-27 PROCEDURE — 99214 OFFICE O/P EST MOD 30 MIN: CPT | Performed by: INTERNAL MEDICINE

## 2020-05-27 PROCEDURE — 20610 DRAIN/INJ JOINT/BURSA W/O US: CPT | Performed by: INTERNAL MEDICINE

## 2020-05-27 PROCEDURE — 1036F TOBACCO NON-USER: CPT | Performed by: INTERNAL MEDICINE

## 2020-05-27 PROCEDURE — G8417 CALC BMI ABV UP PARAM F/U: HCPCS | Performed by: INTERNAL MEDICINE

## 2020-05-27 PROCEDURE — G8427 DOCREV CUR MEDS BY ELIG CLIN: HCPCS | Performed by: INTERNAL MEDICINE

## 2020-05-27 PROCEDURE — 3017F COLORECTAL CA SCREEN DOC REV: CPT | Performed by: INTERNAL MEDICINE

## 2020-05-27 RX ORDER — METHYLPREDNISOLONE ACETATE 40 MG/ML
80 INJECTION, SUSPENSION INTRA-ARTICULAR; INTRALESIONAL; INTRAMUSCULAR; SOFT TISSUE ONCE
Status: COMPLETED | OUTPATIENT
Start: 2020-05-27 | End: 2020-05-27

## 2020-05-27 RX ADMIN — METHYLPREDNISOLONE ACETATE 80 MG: 40 INJECTION, SUSPENSION INTRA-ARTICULAR; INTRALESIONAL; INTRAMUSCULAR; SOFT TISSUE at 14:39

## 2020-05-27 ASSESSMENT — ENCOUNTER SYMPTOMS
TROUBLE SWALLOWING: 0
ABDOMINAL PAIN: 0
BACK PAIN: 1
NAUSEA: 0
DIARRHEA: 0
COUGH: 0
EYE ITCHING: 0
EYE PAIN: 0
CONSTIPATION: 0
SHORTNESS OF BREATH: 0

## 2020-05-27 NOTE — PROGRESS NOTES
[facility] RHEUMATOLOGY FOLLOW UP NOTE       Date Of Service: 5/27/2020  Provider: Jania Chambers DO    Name: Lisa Polk   MRN: [id number]    Chief Complaint(s)     Chief Complaint   Patient presents with    Follow-up     5 months- polyarthralgia & gout        History of Present Illness (HPI)     Lisa Polk  is a(n)49 y.o. 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 Interval hx:    - left thigh wound healed. - no gout falres since the last evaluation. Ongoing pain in the shoulder, elbows, hands, feet, knees and back. Most sever severe pain back , feet and knees. - constant sharp. Denies radicular pain or numbness   Timing: morning  Aggravating factors: Alleviating factors: percocet, hot baths     + AM stiffness lasting ~ 5 hours. +  Gelling. Denies weakness. Previous treatment:      REVIEW OF SYSTEMS: (ROS)    Review of Systems   Constitutional: Negative for fatigue, fever and unexpected weight change. HENT: Negative for congestion and trouble swallowing. Dry  mouth   Eyes: Negative for pain and itching. Respiratory: Negative for cough and shortness of breath. Cardiovascular: Negative for chest pain and leg swelling. Gastrointestinal: Negative for abdominal pain, constipation, diarrhea and nausea. Endocrine: Negative for cold intolerance and heat intolerance. Genitourinary: Negative for difficulty urinating, frequency and urgency. Musculoskeletal: Positive for arthralgias, back pain, myalgias and neck pain. Negative for joint swelling. Skin: Positive for rash (face- improved wtih hydrocortisone cream). Neurological: Negative for dizziness, weakness, numbness and headaches.    Psychiatric/Behavioral: Positive for sleep daily 90 tablet 0    allopurinol (ZYLOPRIM) 100 MG tablet Take 2 tablets by mouth daily 60 tablet 0    lidocaine (XYLOCAINE) 5 % ointment Apply topically as needed to painful areas on lower back, hips, knees, and feet 1 Tube 2    vitamin D 25 MCG (1000 UT) CAPS Take by mouth      esomeprazole Magnesium (NEXIUM) 20 MG PACK Take 20 mg by mouth daily      vitamin C (ASCORBIC ACID) 500 MG tablet Take 500 mg by mouth daily      hydrALAZINE (APRESOLINE) 50 MG tablet Take 50 mg by mouth 3 times daily      Wound Dressings (MAXORB EX) Apply topically      polyethylene glycol (GLYCOLAX) powder Take 17 g by mouth daily      Multiple Vitamins-Minerals (THERAPEUTIC MULTIVITAMIN-MINERALS) tablet Take 1 tablet by mouth daily      Diaper Rash Products (PINXAV) OINT Apply topically      predniSONE (DELTASONE) 20 MG tablet Take 20 mg by mouth daily      Probiotic Product (SOBIA-BID PROBIOTIC PO) Take by mouth      ondansetron (ZOFRAN) 4 MG tablet Take 4 mg by mouth every 8 hours as needed for Nausea or Vomiting      diltiazem (CARDIZEM) 60 MG tablet Take 60 mg by mouth 2 times daily      apixaban (ELIQUIS) 5 MG TABS tablet Take by mouth 2 times daily      tamsulosin (FLOMAX) 0.4 MG capsule Take 0.4 mg by mouth daily      folic acid (FOLVITE) 1 MG tablet Take 1 mg by mouth daily      furosemide (LASIX) 40 MG tablet Take 40 mg by mouth daily      gabapentin (NEURONTIN) 400 MG capsule Take by mouth 2 times daily.  insulin lispro (HUMALOG) 100 UNIT/ML injection vial Inject into the skin 3 times daily (before meals)      sitaGLIPtan-metformin (JANUMET)  MG per tablet Take 1 tablet by mouth 2 times daily (with meals)      insulin glargine (LANTUS) 100 UNIT/ML injection vial Inject into the skin nightly      senna (SENOKOT) 8.6 MG tablet Take 1 tablet by mouth 2 times daily      diazepam (VALIUM) 5 MG tablet Take 5 mg by mouth every 6 hours as needed for Anxiety.       ibuprofen (ADVIL;MOTRIN) 800 MG tablet Take 1 tablet by mouth every 8 hours as needed for Pain 30 tablet 0    ARIPiprazole (ABILIFY PO) Take 1 tablet by mouth.  amlodipine (NORVASC) 10 MG tablet Take 5 mg by mouth daily       Citalopram Hydrobromide (CELEXA PO) Take 40 mg by mouth From [employer] [employer]       Blood Glucose Monitoring Suppl (FREESTYLE LITE) ARTIE Patient needs all supplies for qd testing. DX: 250.00 1 Device 11     No current facility-administered medications for this visit. PHYSICAL EXAMINATION / OBJECTIVE   Objective:  /78 (Site: Left Upper Arm, Position: Sitting, Cuff Size: Large Adult)   Pulse 78   Ht 5' 7.99\" (1.727 m)   Wt (!) 300 lb 0.7 oz (136.1 kg)   SpO2 93%   BMI 45.63 kg/m²     Physical Exam  Vitals signs reviewed. Constitutional:       Appearance: He is well-developed. Neck:      Musculoskeletal: Normal range of motion and neck supple. Cardiovascular:      Rate and Rhythm: Normal rate and regular rhythm. Pulmonary:      Effort: Pulmonary effort is normal.      Breath sounds: Normal breath sounds. Abdominal:      Palpations: Abdomen is soft. Tenderness: There is no abdominal tenderness. Skin:     General: Skin is warm and dry. Findings: No rash. Comments: Redness nasolabial folds,   Scars -- left posterior chest wall  Nail thickening bilateral toenails  Transverse nail grooves bilat. Neurological:      Mental Status: He is alert and oriented to person, place, and time. Deep Tendon Reflexes: Reflexes are normal and symmetric. Psychiatric:         Thought Content: Thought content normal.       Musculoskeletal:        Strength 5/5 throughout expect +4/5 bilateral lower limbs    Upper extremities:   Shoulders:  + tender bilaterally  Elbows:      + olecranon nodules bilat. , tender lat epicondyle. Wrists        NT, NS   Hands:      Tender  PIPs 2-5 bilat. , hypertrophy of PIPs bilat. Swelling right 3rd PIP.    Lower extremities:  Hips:      + tophaceous gout   - continue allopurinol 500 mg daily- titrate to goal uric acid < 5 mg/dl   - continue prednisone 5 mg daily   - left elbow aspiration crystal studies + monosodium urate   - recheck uric acid level- will increase allopurinol if not <5 - last uric acid was 5.2 on April 2020     H/O rheumatoid arthritis  H/o dermatomyositis   - serologies inconsistent with RA, normal CK/aldolase despite d/c of therapy    - currently low suspicion for RA or DM based upon labs and exam   - Imuran stopped previously. Osteoarthritis of multiple joints   - Percocet PRN   - bilateral knee injections for continued knee pain - requested by the patient     Medication monitoring   - CBC, CMP, sed rate, CRP q 8 weeks. -  Need uric acid at this time     Rash -- on face. - referral to dermatology -- ? Seborrhea. - request dermatolgoy records from [facility] - dr. Hernandez Tarango. Return in about 3 months (around 8/27/2020). Electronically signed by Manuela Valle DO on 5/27/2020 at 11:35 AM    New Prescriptions    No medications on file       Thank you for allowing me to participate in the care of this patient. Please call if there are any questions. PROCEDURE NOTE:     Date: 5/27/2020  Location : bilateral knees    PROCEDURE: Arthrocentesis    Indication: knee osteoarthritis / pain     After consent was obtained, using sterile technique the bilateral knees  was prepped w/ Betadine and Chlorhexidine. Ethyl chloride was used as local anesthetic. The area was and No fluid was able to be aspirated  fluid was withdrawn. Depo-Medrol (methylprednisolone 40 mg/ml) and 0.5% bupivacaine without epinephrine was then injected and the needle withdrawn. The procedure was well tolerated. The patient is asked to continue to rest the joint for a few more days before resuming regular activities. It may be more painful for the first 1-2 days. Watch for fever, or increased swelling or persistent pain in the joint.  Call or return to clinic prn if such symptoms occur or there is failure to improve as anticipated.

## 2020-05-27 NOTE — TELEPHONE ENCOUNTER
Patient notified. Per patient he is in the nursing home. Patient will have nurse call our office back if she has any questions.

## 2020-06-02 ENCOUNTER — TELEPHONE (OUTPATIENT)
Dept: PHYSICAL MEDICINE AND REHAB | Age: 50
End: 2020-06-02

## 2020-06-10 ENCOUNTER — TELEPHONE (OUTPATIENT)
Dept: PHYSICAL MEDICINE AND REHAB | Age: 50
End: 2020-06-10

## 2020-06-17 ENCOUNTER — OUTSIDE SERVICES (OUTPATIENT)
Dept: FAMILY MEDICINE CLINIC | Age: 50
End: 2020-06-17
Payer: MEDICARE

## 2020-06-17 PROCEDURE — 99309 SBSQ NF CARE MODERATE MDM 30: CPT | Performed by: NURSE PRACTITIONER

## 2020-06-18 ENCOUNTER — TELEPHONE (OUTPATIENT)
Dept: PHYSICAL MEDICINE AND REHAB | Age: 50
End: 2020-06-18

## 2020-06-18 NOTE — TELEPHONE ENCOUNTER
Spoke with Malini Rush, nurse from [facility] regarding when to get COVID-19 test.     Explained to her he would need to come to [id number] [address]- anywhere from 7am-4pm

## 2020-06-19 ENCOUNTER — OFFICE VISIT (OUTPATIENT)
Dept: PULMONOLOGY | Age: 50
End: 2020-06-19
Payer: MEDICARE

## 2020-06-19 ENCOUNTER — TELEPHONE (OUTPATIENT)
Dept: PULMONOLOGY | Age: 50
End: 2020-06-19

## 2020-06-19 VITALS
BODY MASS INDEX: 45.62 KG/M2 | OXYGEN SATURATION: 97 % | DIASTOLIC BLOOD PRESSURE: 80 MMHG | SYSTOLIC BLOOD PRESSURE: 130 MMHG | HEIGHT: 68 IN | TEMPERATURE: 98.1 F | HEART RATE: 76 BPM

## 2020-06-19 PROCEDURE — G8427 DOCREV CUR MEDS BY ELIG CLIN: HCPCS | Performed by: NURSE PRACTITIONER

## 2020-06-19 PROCEDURE — 3017F COLORECTAL CA SCREEN DOC REV: CPT | Performed by: NURSE PRACTITIONER

## 2020-06-19 PROCEDURE — 1036F TOBACCO NON-USER: CPT | Performed by: NURSE PRACTITIONER

## 2020-06-19 PROCEDURE — 99213 OFFICE O/P EST LOW 20 MIN: CPT | Performed by: NURSE PRACTITIONER

## 2020-06-19 PROCEDURE — G8417 CALC BMI ABV UP PARAM F/U: HCPCS | Performed by: NURSE PRACTITIONER

## 2020-06-19 RX ORDER — BUSPIRONE HYDROCHLORIDE 10 MG/1
10 TABLET ORAL 2 TIMES DAILY
COMMUNITY

## 2020-06-19 NOTE — PROGRESS NOTES
Vitamin D deficiency        Past Surgical History:   Procedure Laterality Date    FOOT SURGERY Right     2018, R foot osteomyelitis    TRACHEOSTOMY      as a baby       Social History     Tobacco Use    Smoking status: Never Smoker    Smokeless tobacco: Never Used   Substance Use Topics    Alcohol use: No     Comment: hx of abuse    Drug use: No       Allergies   Allergen Reactions    Pcn [Penicillins]        Current Outpatient Medications   Medication Sig Dispense Refill    busPIRone (BUSPAR) 10 MG tablet Take 10 mg by mouth 3 times daily      naproxen (NAPROSYN) 500 MG tablet Take 1 tablet by mouth 2 times daily (with meals) For 5 days 10 tablet 0    Trolamine Salicylate (ASPERCREME) 10 % LOTN Apply topically as needed for Pain 1 Bottle 0    pantoprazole (PROTONIX) 40 MG tablet Take 1 tablet by mouth daily 5 tablet 0    allopurinol (ZYLOPRIM) 300 MG tablet Take 1 tablet by mouth daily 90 tablet 0    allopurinol (ZYLOPRIM) 100 MG tablet Take 2 tablets by mouth daily 60 tablet 0    lidocaine (XYLOCAINE) 5 % ointment Apply topically as needed to painful areas on lower back, hips, knees, and feet 1 Tube 2    vitamin D 25 MCG (1000 UT) CAPS Take by mouth      esomeprazole Magnesium (NEXIUM) 20 MG PACK Take 20 mg by mouth daily      vitamin C (ASCORBIC ACID) 500 MG tablet Take 500 mg by mouth daily      hydrALAZINE (APRESOLINE) 50 MG tablet Take 50 mg by mouth 3 times daily      Wound Dressings (MAXORB EX) Apply topically      polyethylene glycol (GLYCOLAX) powder Take 17 g by mouth daily      Multiple Vitamins-Minerals (THERAPEUTIC MULTIVITAMIN-MINERALS) tablet Take 1 tablet by mouth daily      Diaper Rash Products (PINXAV) OINT Apply topically      predniSONE (DELTASONE) 20 MG tablet Take 20 mg by mouth daily      Probiotic Product (SOBIA-BID PROBIOTIC PO) Take by mouth      ondansetron (ZOFRAN) 4 MG tablet Take 4 mg by mouth every 8 hours as needed for Nausea or Vomiting      diltiazem

## 2020-06-21 NOTE — PROGRESS NOTES
NAME: Christene Barthel  DATE: 2020  ROOM #: 14-1  CODE STATUS: FULL  REASON FOR VISIT: Follow up of chronic medical conditions. : [birth year]  Skilled Patient?: No    HPI: Pt seen and examined at bedside. History is partially obtained from chart review. Patient up in chair, sleepy today. Awakens, but dozes easily during assessment. Admits that he is still not wearing CPAP all night every night. Stating that he is trying to improve on the time that he wears CPAP mask. Reporting that he is doing well overall, and feels that his pain is manageable right now. Denies any fever, chills, cough, congestion, increased shortness of breath, chest pain, nausea, vomiting, diarrhea. Continues to have fatigue, which is ongoing and in part related to noncompliance with CPAP. Notes a good appetite. Has no additional concerns or complaints today. Nursing offers no concerns    Pain management clinical notes cannot be scanned this visit, however in summary: Plan is to proceed with bilateral hip steroid injection, starting with the left hip. He is to be off blood thinners 5 days prior to injections. Continue with Prednisone, Percocet, and Lidocaine ointment for pain. PMHx: RA, DM2, AFib, HTN, leukocytosis, CP, abd pain, septic arthritis, hip pain, frontal lobe mass, morbid obesity, long-term opiate analgesic use, hepatic steatosis, esophagitis, neuropathy, weakness, pleurisy, auditory hallucinations.    PSHx: Tracheostomy, cardiac catheterization, Appendectomy, Foot surgery.    Social Hx: No tobacco or EtOH. Denies drug use.    FamHx: CAD    Allergies, medications, labs and radiology reports were reviewed through chart review. Patients past medical, surgical, social and family history have been reviewed and updates were made where appropriate.     Review of Systems  Positive responses are indicated with +    Constitutional:  Fever, Chills, +Fatigue, Unexpected changes in weight  Eyes:  Eye discharge, Eye pain, Eye redness, Visual disturbances   HENT:  Ear pain, Tinnitus, Nosebleeds, Trouble swallowing  Cardiovascular:  Chest Pain, Palpitations  Respiratory:  Cough, Wheezing, +Shortness of breath, Chest tightness, Apnea  Gastrointestinal:  Nausea, Vomiting, Diarrhea, Constipation, Heartburn, Blood in stool  Genitourinary:  Difficulty or painful urination, Flank pain, Change in frequency, Urgency  Skin:  Color change, Rash, Itching, Wound  Psychiatric:  Hallucinations, Anxiety, Depression, Suicidal ideation  Hematological:  Enlarged glands, Easy bleeding, Easily bruising  Musculoskeletal:  +Joint pain, Back pain, +Gait problems, Joint swelling, Myalgias  Neurological:  Dizziness, Headaches, Presyncope, Numbness, Seizures, Tremors  Allergy:  Environmental allergies, Food allergies  Endocrine:  Heat Intolerance, Cold Intolerance, Polydipsia, Polyphagia, Polyuria    PHYSICAL EXAM  Vital Signs: T, BP, P, RR, Wt  97.9, 104/50, 76, 20, 302#  General Appearance: well developed and well- nourished, obese male, in no acute distress  Head: normocephalic and atraumatic  Eyes: pupils equal, round, and reactive to light, conjunctivae and eye lids without erythema  ENT: external ear normal, nose without deformity, nasal mucosa and turbinates normal without polyps, oropharynx normal, dentition is normal for age, no lip or gum lesions noted  Pulmonary/Chest: No respiratory distress or retractions  Extremities: no cyanosis, clubbing or edema of the lower extremities  Musculoskeletal: No joint swelling or gross deformity. Neuro: Sleepy, normal speech, no focal findings or movement disorder noted  Psych: Normal affect without evidence of depression or anxiety, insight and judgement are appropriate, memory appears intact  Skin: warm and dry. ASSESSMENT AND PLAN  1. RA:  Continue on Prednisone 7.5 mg daily, Xylocaine, and Percocet. POC per Dr Jonathan Cole. 2. OA: Notes relief with current pain management.   Is scheduled for joint injections

## 2020-06-23 ENCOUNTER — HOSPITAL ENCOUNTER (OUTPATIENT)
Age: 50
Discharge: HOME | End: 2020-06-23
Payer: MEDICARE

## 2020-06-23 PROCEDURE — U0002 COVID-19 LAB TEST NON-CDC: HCPCS

## 2020-06-24 LAB
PERFORMING LAB: NORMAL
REPORT: NORMAL
SARS-COV-2: NOT DETECTED

## 2020-06-26 NOTE — FLOWSHEET NOTE
In the last month, have you been in contact with someone who was confirmed or suspected to have Coronavirus/COVID-19:  Patient stated NO    Do you or family members have any of the following symptoms:  Cough-no   Muscle pain-no   Shortness of breath-no   Fever-no   Weakness-no  Severe headache-no   Sore throat-no   Respiratory symptoms-no    Have you traveled internationally in the last month?  No

## 2020-06-29 ENCOUNTER — TELEPHONE (OUTPATIENT)
Dept: PHYSICAL MEDICINE AND REHAB | Age: 50
End: 2020-06-29

## 2020-06-29 ENCOUNTER — HOSPITAL ENCOUNTER (OUTPATIENT)
Age: 50
Setting detail: OUTPATIENT SURGERY
Discharge: HOME | End: 2020-06-29
Attending: PAIN MEDICINE | Admitting: PAIN MEDICINE
Payer: MEDICARE

## 2020-06-29 ENCOUNTER — ANESTHESIA (OUTPATIENT)
Dept: OPERATING ROOM | Age: 50
End: 2020-06-29
Payer: MEDICARE

## 2020-06-29 ENCOUNTER — ANESTHESIA EVENT (OUTPATIENT)
Dept: OPERATING ROOM | Age: 50
End: 2020-06-29
Payer: MEDICARE

## 2020-06-29 ENCOUNTER — APPOINTMENT (OUTPATIENT)
Dept: GENERAL RADIOLOGY | Age: 50
End: 2020-06-29
Attending: PAIN MEDICINE
Payer: MEDICARE

## 2020-06-29 VITALS
HEIGHT: 68 IN | SYSTOLIC BLOOD PRESSURE: 137 MMHG | OXYGEN SATURATION: 95 % | HEART RATE: 65 BPM | WEIGHT: 306.6 LBS | DIASTOLIC BLOOD PRESSURE: 93 MMHG | RESPIRATION RATE: 16 BRPM | BODY MASS INDEX: 46.47 KG/M2 | TEMPERATURE: 98.5 F

## 2020-06-29 VITALS
SYSTOLIC BLOOD PRESSURE: 126 MMHG | TEMPERATURE: 96.8 F | DIASTOLIC BLOOD PRESSURE: 75 MMHG | OXYGEN SATURATION: 96 % | RESPIRATION RATE: 19 BRPM

## 2020-06-29 LAB — GLUCOSE BLD-MCNC: 110 MG/DL (ref 70–108)

## 2020-06-29 PROCEDURE — 6360000002 HC RX W HCPCS: Performed by: PAIN MEDICINE

## 2020-06-29 PROCEDURE — 3700000000 HC ANESTHESIA ATTENDED CARE: Performed by: PAIN MEDICINE

## 2020-06-29 PROCEDURE — 2580000003 HC RX 258: Performed by: NURSE ANESTHETIST, CERTIFIED REGISTERED

## 2020-06-29 PROCEDURE — 7100000011 HC PHASE II RECOVERY - ADDTL 15 MIN: Performed by: PAIN MEDICINE

## 2020-06-29 PROCEDURE — 2500000003 HC RX 250 WO HCPCS: Performed by: NURSE ANESTHETIST, CERTIFIED REGISTERED

## 2020-06-29 PROCEDURE — 3209999900 FLUORO FOR SURGICAL PROCEDURES

## 2020-06-29 PROCEDURE — 2709999900 HC NON-CHARGEABLE SUPPLY: Performed by: PAIN MEDICINE

## 2020-06-29 PROCEDURE — 6360000002 HC RX W HCPCS: Performed by: NURSE ANESTHETIST, CERTIFIED REGISTERED

## 2020-06-29 PROCEDURE — 3600000052 HC PAIN LEVEL 2 BASE: Performed by: PAIN MEDICINE

## 2020-06-29 PROCEDURE — 2500000003 HC RX 250 WO HCPCS: Performed by: PAIN MEDICINE

## 2020-06-29 PROCEDURE — 82948 REAGENT STRIP/BLOOD GLUCOSE: CPT

## 2020-06-29 PROCEDURE — 7100000010 HC PHASE II RECOVERY - FIRST 15 MIN: Performed by: PAIN MEDICINE

## 2020-06-29 PROCEDURE — 77002 NEEDLE LOCALIZATION BY XRAY: CPT | Performed by: PAIN MEDICINE

## 2020-06-29 PROCEDURE — 20610 DRAIN/INJ JOINT/BURSA W/O US: CPT | Performed by: PAIN MEDICINE

## 2020-06-29 RX ORDER — LIDOCAINE HYDROCHLORIDE 10 MG/ML
INJECTION, SOLUTION INFILTRATION; PERINEURAL PRN
Status: DISCONTINUED | OUTPATIENT
Start: 2020-06-29 | End: 2020-06-29 | Stop reason: SDUPTHER

## 2020-06-29 RX ORDER — OXYCODONE HYDROCHLORIDE AND ACETAMINOPHEN 5; 325 MG/1; MG/1
1 TABLET ORAL EVERY 4 HOURS PRN
Status: ON HOLD | COMMUNITY
End: 2020-11-06 | Stop reason: HOSPADM

## 2020-06-29 RX ORDER — LIDOCAINE HYDROCHLORIDE 10 MG/ML
INJECTION, SOLUTION INFILTRATION; PERINEURAL PRN
Status: DISCONTINUED | OUTPATIENT
Start: 2020-06-29 | End: 2020-06-29 | Stop reason: ALTCHOICE

## 2020-06-29 RX ORDER — PROPOFOL 10 MG/ML
INJECTION, EMULSION INTRAVENOUS PRN
Status: DISCONTINUED | OUTPATIENT
Start: 2020-06-29 | End: 2020-06-29 | Stop reason: SDUPTHER

## 2020-06-29 RX ORDER — SODIUM CHLORIDE 9 MG/ML
INJECTION, SOLUTION INTRAVENOUS CONTINUOUS PRN
Status: DISCONTINUED | OUTPATIENT
Start: 2020-06-29 | End: 2020-06-29 | Stop reason: SDUPTHER

## 2020-06-29 RX ORDER — METHYLPREDNISOLONE ACETATE 80 MG/ML
INJECTION, SUSPENSION INTRA-ARTICULAR; INTRALESIONAL; INTRAMUSCULAR; SOFT TISSUE PRN
Status: DISCONTINUED | OUTPATIENT
Start: 2020-06-29 | End: 2020-06-29 | Stop reason: ALTCHOICE

## 2020-06-29 RX ORDER — BUPIVACAINE HYDROCHLORIDE 2.5 MG/ML
INJECTION, SOLUTION EPIDURAL; INFILTRATION; INTRACAUDAL PRN
Status: DISCONTINUED | OUTPATIENT
Start: 2020-06-29 | End: 2020-06-29 | Stop reason: ALTCHOICE

## 2020-06-29 RX ADMIN — SODIUM CHLORIDE: 9 INJECTION, SOLUTION INTRAVENOUS at 11:47

## 2020-06-29 RX ADMIN — PROPOFOL 20 MG: 10 INJECTION, EMULSION INTRAVENOUS at 11:52

## 2020-06-29 RX ADMIN — LIDOCAINE HYDROCHLORIDE 50 MG: 10 INJECTION, SOLUTION INFILTRATION; PERINEURAL at 11:51

## 2020-06-29 RX ADMIN — PROPOFOL 20 MG: 10 INJECTION, EMULSION INTRAVENOUS at 11:53

## 2020-06-29 ASSESSMENT — PULMONARY FUNCTION TESTS
PIF_VALUE: 1

## 2020-06-29 ASSESSMENT — PAIN - FUNCTIONAL ASSESSMENT: PAIN_FUNCTIONAL_ASSESSMENT: 0-10

## 2020-06-29 ASSESSMENT — PAIN SCALES - GENERAL: PAINLEVEL_OUTOF10: 0

## 2020-06-29 NOTE — OP NOTE
Operative Note    Pre-Procedure Note    Patient Name: Neeru Ivan   YOB: 1970  Medical Record Number: [id number]  Date: 6/29/20       Indication:  Left hip pain  Consent: On file. Vital Signs:   Vitals:    06/29/20 1110   BP: 138/83   Pulse: 67   Resp: 16   Temp: 97 °F (36.1 °C)   SpO2: 95%       Past Medical History:   has a past medical history of Arthritis, Atrial fibrillation ([address] BPH (benign prostatic hyperplasia), CAD (coronary artery disease), Carpal tunnel syndrome on right, Chronic gout, DM2 (diabetes mellitus, type 2) ([address] GERD (gastroesophageal reflux disease), Heart failure with preserved ejection fraction ([address] History of alcohol abuse, History of osteomyelitis, Hyperlipidemia, Hypertension, essential, Hypogonadism, male, Major depression, Mood disorder ([address] Morbid obesity ([address] Muscle weakness, DURAN (nonalcoholic steatohepatitis), Obstructive sleep apnea treated with continuous positive airway pressure (CPAP), KIARA (obstructive sleep apnea), and Vitamin D deficiency. Past Surgical History:   has a past surgical history that includes tracheostomy and Foot surgery (Right). Pre-Sedation Documentation and Exam:   Vital signs have been reviewed (see flow sheet for vitals). Sedation/ Anesthesia Plan:   MAC    Patient is an appropriate candidate for plan of sedation: yes    Preoperative Diagnosis:   1.  left hip joint osteoarthritis. Postoperative Diagnosis:   1. left hip joint osteoarthritis. Procedure Performed:  1.  left hip joint injection. under fluroscopic guidance. Indication for the Procedure: The patient is complaining of pain in the left hip area of longstanding duration. Patient's pain is not responding to conservative measures and is interfering with activities of daily living. Physical examination revealed tenderness over the joint and movements of the joint are painful.   Holger's test is positive with patient complaining of pain in the

## 2020-06-29 NOTE — H&P
[address] [id number]  History and Physical Update    Pt Name: Silvia Ambrocio  MRN: [id number]  YOB: 1970  Date of evaluation: 6/29/2020      I have examined the patient and reviewed the H&P/Consult and there are no changes to the patient or plans.         Electronically signed by Rafael Rogel MD on 6/29/2020 at 11:26 AM

## 2020-06-29 NOTE — ANESTHESIA PRE PROCEDURE
[P[place]illins]        Problem List:    Patient Active Problem List   Diagnosis Code    Chronic diastolic congestive heart failure (HCC) I50.32    Atrial fibrillation (HCC) I48.91    BPH (benign prostatic hyperplasia) N40.0    Carpal tunnel syndrome on right G56.01    Chronic gout M1A. 9XX0    DM2 (diabetes mellitus, type 2) ([facility] E11.9    Heart failure with preserved ejection fraction (HCC) I50.30    History of alcohol abuse F10.11    History of osteomyelitis Z87.39    Hypertension, essential I10    Hypogonadism, male E29.1    Major depression F32.9    Morbid obesity (HCC) E66.01    DURAN (nonalcoholic steatohepatitis) K75.81    KIARA (obstructive sleep apnea) G47.33    Vitamin D deficiency E55.9    Normocytic anemia D64.9    Physical deconditioning R53.81    Mood disorder (HCC) F39    Hallucinations R44.3    GERD (gastroesophageal reflux disease) K21.9    Wound of buttock S31.809A    Chest wall pain with tenderness R07.89       Past Medical History:        Diagnosis Date    Arthritis     RHEUMATOID    Atrial fibrillation (HCC)     BPH (benign prostatic hyperplasia)     CAD (coronary artery disease)     Carpal tunnel syndrome on right     rt hand    Chronic gout     DM2 (diabetes mellitus, type 2) (HCC)     GERD (gastroesophageal reflux disease)     Heart failure with preserved ejection fraction (HCC)     History of alcohol abuse     History of osteomyelitis     Hx of R foot wound, osteomyelitis July 2018 requiring surgery and IV Vanco    Hyperlipidemia     Hypertension, essential     Hypogonadism, male     Major depression     Mood disorder ([facility]     Morbid obesity ([facility]     Muscle weakness     DURAN (nonalcoholic steatohepatitis)     Obstructive sleep apnea treated with continuous positive airway pressure (CPAP)     KIARA (obstructive sleep apnea)     Vitamin D deficiency        Past Surgical History:        Procedure Laterality Date    FOOT SURGERY Right     2018, R foot POCGLU, POCNA, POCK, POCCL, POCBUN, POCHEMO, POCHCT in the last 72 hours. Coags:   Lab Results   Component Value Date    PROTIME 24.4 06/27/2018    INR 1.33 05/20/2020    APTT 36.6 05/20/2020       HCG (If Applicable): No results found for: PREGTESTUR, PREGSERUM, HCG, HCGQUANT     ABGs: No results found for: PHART, PO2ART, TVY9IID, YFT3BNA, BEART, K9VRUUDI     Type & Screen (If Applicable):  No results found for: LABABO, LABRH    Drug/Infectious Status (If Applicable):  Lab Results   Component Value Date    HEPCAB Negative 10/15/2019       COVID-19 Screening (If Applicable):   Lab Results   Component Value Date    COVID19 NOT DETECTED 06/23/2020         Anesthesia Evaluation    Airway: Mallampati: III       Mouth opening: > = 3 FB Dental:          Pulmonary:   (+) sleep apnea:                             Cardiovascular:    (+) hypertension:, CAD:, CHF:,                   Neuro/Psych:   (+) neuromuscular disease:, psychiatric history:            GI/Hepatic/Renal:   (+) GERD:, liver disease:,           Endo/Other:    (+) Diabetes, . Abdominal:   (+) obese,         Vascular:                                        Anesthesia Plan      MAC     ASA 4             Anesthetic plan and risks discussed with patient. Plan discussed with CRNA.                   Humberto Lake MD   6/29/2020

## 2020-06-29 NOTE — H&P
H & P    Patient referred here from pcp Dr. Juanda Heimlich for hip pain. Patient has complaints of lower back pain, hip pain, knee pain, and foot pain. Has a history of RA and gout and follows with Rheumatology. Pain has been chronic for over a year and patient feels that it is getting worse. Pain across lower back is constant stabbing aching pain, pain in bilateral feet is stabbing and aching, pain in hips and knees constant aching pain. Pain is constant and patient currently rtets pain at 8/10 at this times. At best pain decreases down to 5/10 relieved with Percocet. At worst pain increases to 8/10 aggravated with walking, moving, activity.      Pain interferes with \"everything\" daily functioning, walking as he is currently in wheel chair, standing, sleep, mood. Patient resides at [facility] and is on Percocet 2 tabs BID prn, at [facility] was working therapies and does not feel it is helping pain, is on prednisone. Patient states that that he has ever had injections and has not seen pain management in the past.         The patient is allergic to pcn [penicillins].     PastMedical History  Jostin Mcgovern  has a past medical history of Atrial fibrillation ([address] BPH (benign prostatic hyperplasia), Carpal tunnel syndrome on right, Chronic gout, DM2 (diabetes mellitus, type 2) ([address] GERD (gastroesophageal reflux disease), Heart failure with preserved ejection fraction ([address] History of alcohol abuse, History of osteomyelitis, Hypertension, essential, Hypogonadism, male, Major depression, Mood disorder ([address] Morbid obesity ([address] DURAN (nonalcoholic steatohepatitis), KIARA (obstructive sleep apnea), and Vitamin D deficiency.     Past Surgical History  The patient  has a past surgical history that includes tracheostomy and Foot surgery (Right).     Family History  This patient's family history includes Cancer in his paternal aunt; Heart Disease in his mother.     Social History  Jostin Mcgovern  reports that he has never smoked.  He has never used smokeless tobacco. He reports that he does not drink alcohol or use drugs.     Medications    Current Medication      Current Outpatient Medications:     lidocaine (XYLOCAINE) 5 % ointment, Apply topically as needed to painful areas on lower back, hips, knees, and feet, Disp: 1 Tube, Rfl: 2    allopurinol (ZYLOPRIM) 300 MG tablet, Take 1.5 tablets by mouth daily, Disp: 45 tablet, Rfl: 0    oxyCODONE-acetaminophen (PERCOCET) 5-325 MG per tablet, Take 1 tablet by mouth every 4 hours as needed for Pain., Disp: , Rfl:     vitamin D 25 MCG (1000 UT) CAPS, Take by mouth, Disp: , Rfl:     esomeprazole Magnesium (NEXIUM) 20 MG PACK, Take 20 mg by mouth daily, Disp: , Rfl:     vitamin C (ASCORBIC ACID) 500 MG tablet, Take 500 mg by mouth daily, Disp: , Rfl:     hydrALAZINE (APRESOLINE) 50 MG tablet, Take 50 mg by mouth 3 times daily, Disp: , Rfl:     Wound Dressings (MAXORB EX), Apply topically, Disp: , Rfl:     polyethylene glycol (GLYCOLAX) powder, Take 17 g by mouth daily, Disp: , Rfl:     Multiple Vitamins-Minerals (THERAPEUTIC MULTIVITAMIN-MINERALS) tablet, Take 1 tablet by mouth daily, Disp: , Rfl:     Diaper Rash Products (PINXAV) OINT, Apply topically, Disp: , Rfl:     predniSONE (DELTASONE) 20 MG tablet, Take 20 mg by mouth daily, Disp: , Rfl:     Probiotic Product (SOBIA-BID PROBIOTIC PO), Take by mouth, Disp: , Rfl:     ondansetron (ZOFRAN) 4 MG tablet, Take 4 mg by mouth every 8 hours as needed for Nausea or Vomiting, Disp: , Rfl:     diltiazem (CARDIZEM) 60 MG tablet, Take 60 mg by mouth 2 times daily, Disp: , Rfl:     apixaban (ELIQUIS) 5 MG TABS tablet, Take by mouth 2 times daily, Disp: , Rfl:     tamsulosin (FLOMAX) 0.4 MG capsule, Take 0.4 mg by mouth daily, Disp: , Rfl:     folic acid (FOLVITE) 1 MG tablet, Take 1 mg by mouth daily, Disp: , Rfl:     furosemide (LASIX) 40 MG tablet, Take 40 mg by mouth daily, Disp: , Rfl:     gabapentin (NEURONTIN) 400 MG capsule, Take by mouth 2 times daily. , Disp: , Rfl:     insulin lispro (HUMALOG) 100 UNIT/ML injection vial, Inject into the skin 3 times daily (before meals), Disp: , Rfl:     sitaGLIPtan-metformin (JANUMET)  MG per tablet, Take 1 tablet by mouth 2 times daily (with meals), Disp: , Rfl:     insulin glargine (LANTUS) 100 UNIT/ML injection vial, Inject into the skin nightly, Disp: , Rfl:     senna (SENOKOT) 8.6 MG tablet, Take 1 tablet by mouth 2 times daily, Disp: , Rfl:     diazepam (VALIUM) 5 MG tablet, Take 5 mg by mouth every 6 hours as needed for Anxiety. , Disp: , Rfl:     ibuprofen (ADVIL;MOTRIN) 800 MG tablet, Take 1 tablet by mouth every 8 hours as needed for Pain, Disp: 30 tablet, Rfl: 0    ARIPiprazole (ABILIFY PO), Take 1 tablet by mouth.  , Disp: , Rfl:     amlodipine (NORVASC) 10 MG tablet, Take 5 mg by mouth daily , Disp: , Rfl:     Citalopram Hydrobromide (CELEXA PO), Take 40 mg by mouth From [employer] , Disp: , Rfl:     Blood Glucose Monitoring Suppl (PicosunSTYLE LITE) ARTIE, Patient needs all supplies for qd testing. DX: 250.00, Disp: 1 Device, Rfl: 11        Subjective:      Review of Systems   Constitutional: Positive for activity change. HENT: Negative. Eyes: Negative. Respiratory: Negative. Cardiovascular: Positive for leg swelling. Gastrointestinal: Negative. Endocrine: Negative. Genitourinary: Negative. Musculoskeletal: Positive for arthralgias, back pain, gait problem, joint swelling, myalgias, neck pain and neck stiffness. Skin: Negative. Negative for color change. Neurological: Positive for weakness and numbness. Negative for headaches. Psychiatric/Behavioral: Positive for dysphoric mood and sleep disturbance.         Objective:      Vitals       Vitals:     03/11/20 1131   BP: 138/74   Weight: (!) 301 lb 5.9 oz (136.7 kg)   Height: 5' 7.99\" (1.727 m)            Physical Exam  Constitutional:       Appearance: Normal appearance.    HENT:      Head: Normocephalic and atraumatic. Right Ear: External ear normal.      Left Ear: External ear normal.      Nose: Nose normal.      Mouth/Throat:      Mouth: Mucous membranes are moist.      Pharynx: Oropharynx is clear. Eyes:      General:         Right eye: No discharge. Extraocular Movements: Extraocular movements intact. Conjunctiva/sclera: Conjunctivae normal.      Pupils: Pupils are equal, round, and reactive to light. Neck:      Musculoskeletal: Normal range of motion and neck supple. Cardiovascular:      Rate and Rhythm: Normal rate and regular rhythm. Pulses: Decreased pulses. Pulmonary:      Effort: Pulmonary effort is normal.      Breath sounds: Normal breath sounds. Abdominal:      General: Abdomen is flat. Bowel sounds are normal.   Musculoskeletal:         General: Tenderness present. Right shoulder: He exhibits decreased range of motion. Left shoulder: He exhibits decreased range of motion. Right wrist: He exhibits decreased range of motion. Right hip: He exhibits decreased range of motion, decreased strength, tenderness and bony tenderness. Left hip: He exhibits decreased range of motion, decreased strength, tenderness and bony tenderness. Right knee: He exhibits decreased range of motion and swelling. Tenderness found. Left knee: He exhibits decreased range of motion and swelling. Tenderness found. Right ankle: He exhibits decreased range of motion and swelling. Tenderness. Left ankle: He exhibits decreased range of motion and swelling. Tenderness. Cervical back: He exhibits decreased range of motion, tenderness and bony tenderness. Lumbar back: He exhibits decreased range of motion, tenderness, bony tenderness and pain. Right hand: He exhibits decreased range of motion. Right upper leg: He exhibits tenderness and bony tenderness. Right lower leg: He exhibits tenderness, bony tenderness and swelling.  2+ Pitting Edema present. Left lower leg: He exhibits tenderness, bony tenderness and swelling. 2+ Pitting Edema present. Left foot: Decreased range of motion. Tenderness present. Skin:     General: Skin is warm and dry. Neurological:      General: No focal deficit present. Mental Status: He is alert and oriented to person, place, and time. Sensory: Sensory deficit present. Motor: Weakness present. Coordination: Coordination abnormal.      Gait: Gait abnormal.      Deep Tendon Reflexes:      Reflex Scores:       Tricep reflexes are 2+ on the right side and 2+ on the left side. Bicep reflexes are 2+ on the right side and 2+ on the left side. Brachioradialis reflexes are 2+ on the right side and 2+ on the left side. Patellar reflexes are 1+ on the right side and 1+ on the left side. Achilles reflexes are 1+ on the right side and 1+ on the left side. Comments: Strength 4/5 bilateral lower extremities     Numbness and decreased sensation bilateral feet. Psychiatric:         Mood and Affect: Mood normal.         BIGG test: + bilaterally   Yeoman's test: + bilaterally   Gaenslen test: + bilaterally   Assessment:      1. Primary osteoarthritis of both hips    2. Arthralgia, unspecified joint    3. Rheumatoid arthritis involving multiple sites, unspecified rheumatoid factor presence ([facility]    4. Debility    5. Lumbar pain    6. Chronic pain syndrome    7. Primary osteoarthritis of both knees       Plan:      · Patient read and signed orientation and opioid agreement. · OARRS reviewed. Current MED: 30 mg   · Patient was not offered naloxone for home. · Discussed long term side effects of medications, tolerance, dependency and addiction. · UDS preformed today. · Patient told can not receive any pain medications from any other source. · No evidence of abuse, diversion or aberrant behavior.   · Medications and/or procedures to improve function and quality of life- patient understanding with this and that may not be pain free  · Discussed possible weaning of medication dosing dependent on treatment/procedure results. · Discussed with patient about safe storage of medications at home  · Discussed with patient about risks with procedure including infection, reaction to medication, increased pain, or bleeding. · Reviewed pcp notes in detail  · Review[facility] hip, pelvis and knee x[facility]  · Ordered L-xray d/t lumbar pain   · Plan bilateral hip steroid injection, Left HIP FIRST. Procedure and risks discussed in detail. · Patient is a diabetic   · Will need to be cleared to be off blood thinners 5 days prior to procedure  · Continue Percocet at [facility]. Ordered initial UDS today   · Continue to follow with Rheumatology   · Ordered Lidocaine ointment for pain      Previous Treatments tried:  · PT: Yes,  any benefit? No, how many weeks? At [facility]   · NSAIDs: Yes,  any benefit? On steroids   · Chiropractic: No  · Muscle relaxants: No  · Narcotics: Yes,  any benefit? No  · Spine surgeon consult: No  · Any Implants: No          Return for bilateral hip steroid injection, Left HIP FIRST. , follow up after procedure.

## 2020-06-29 NOTE — PROGRESS NOTES
1158-Patient to Phase II via cart. Report received from [facility]. Patient drowsy but responsive. Vitals obtained and stable. Respirations even and unlabored on room air. Patient denies pain, nausea, numbness and tingling. Patient able to move all extremities. No drainage noted at injection sites. Per Isacc Eisenberg RN open wound found on left hip. Dressing in place. Patient instructed to stay in bed. Instructed on call light use.  1205-Patient provided with snack and drink. Denies needs. Call light remains in reach. [facility] called to schedule transport. 1220-IV removed with no complications. Patient getting dressed in bed. Report called to Ginger Boyce RN at [facility]. Notified her of wound on left hip. 1230-Patient assisted to wheelchair. Waiting on transport. 1250-Call placed to [facility] to ask time of pickup. States they will arrive soon. Patient updated. 1316-Patient discharged in stable condition back to [facility] via [facility]. Patient brought to car via wheelchair with assistance from RN. Patient tolerated well. All belongings sent with patient.

## 2020-07-15 ENCOUNTER — OUTSIDE SERVICES (OUTPATIENT)
Dept: FAMILY MEDICINE CLINIC | Age: 50
End: 2020-07-15
Payer: MEDICARE

## 2020-07-15 PROCEDURE — 99308 SBSQ NF CARE LOW MDM 20: CPT | Performed by: NURSE PRACTITIONER

## 2020-07-15 NOTE — PROGRESS NOTES
NAME: Mercer Sandifer  DATE: 7-  ROOM #: 14-1  CODE STATUS: FULL  REASON FOR VISIT: Follow up of chronic medical conditions. : [birth year]  Skilled Patient?: No    HPI: Pt seen and examined at bedside. History is partially obtained from chart review. Patient sitting up on side of bed, alert and oriented. Bright affect. Stating that he is doing very well. Reporting that cortisone shots in hip and knee have been very effective. He actually denies any pain at this time, which is significant progress for him. He is stating that he believes he found a place to live, and hopes to be discharged from facility by the end of the month. Reporting good appetite, regular bowel movements, no shortness of breath or cough, and is sleeping well at night. Stating that he wears his CPAP \"sometimes\", and that he is trying to be compliant. Nursing does not report any new concerns. Pain management clinical notes cannot be scanned this visit, however in summary: Plan is to proceed with bilateral hip steroid injection, starting with the left hip. He is to be off blood thinners 5 days prior to injections. Continue with Prednisone, Percocet, and Lidocaine ointment for pain. PMHx: RA, DM2, AFib, HTN, leukocytosis, CP, abd pain, septic arthritis, hip pain, frontal lobe mass, morbid obesity, long-term opiate analgesic use, hepatic steatosis, esophagitis, neuropathy, weakness, pleurisy, auditory hallucinations.    PSHx: Tracheostomy, cardiac catheterization, Appendectomy, Foot surgery.    Social Hx: No tobacco or EtOH. Denies drug use.    FamHx: CAD    Allergies, medications, labs and radiology reports were reviewed through chart review. Patients past medical, surgical, social and family history have been reviewed and updates were made where appropriate.     Review of Systems  Positive responses are indicated with +    Constitutional:  Fever, Chills, Fatigue, Unexpected changes in weight  Eyes:  Eye discharge, Eye pain, Eye redness, Visual disturbances   HENT:  Ear pain, Tinnitus, Nosebleeds, Trouble swallowing  Cardiovascular:  Chest Pain, Palpitations  Respiratory:  Cough, Wheezing, Shortness of breath, Chest tightness, Apnea  Gastrointestinal:  Nausea, Vomiting, Diarrhea, Constipation, Heartburn, Blood in stool  Genitourinary:  Difficulty or painful urination, Flank pain, Change in frequency, Urgency  Skin:  Color change, Rash, Itching, Wound  Psychiatric:  Hallucinations, Anxiety, Depression, Suicidal ideation  Hematological:  Enlarged glands, Easy bleeding, Easily bruising  Musculoskeletal:  Joint pain, Back pain, +Gait problems, Joint swelling, Myalgias  Neurological:  Dizziness, Headaches, Presyncope, Numbness, Seizures, Tremors  Allergy:  Environmental allergies, Food allergies  Endocrine:  Heat Intolerance, Cold Intolerance, Polydipsia, Polyphagia, Polyuria    PHYSICAL EXAM  Vital Signs: T, BP, P, RR, Wt  98.4, 148/88, 80, 20, 304#  General Appearance: well developed and well- nourished, obese male, in no acute distress  Head: normocephalic and atraumatic  Eyes: pupils equal, round, and reactive to light, conjunctivae and eye lids without erythema  ENT: external ear normal, nose without deformity, nasal mucosa and turbinates normal without polyps, oropharynx normal, dentition is normal for age, no lip or gum lesions noted  Pulmonary/Chest: No respiratory distress or retractions  Extremities: no cyanosis, clubbing or edema of the lower extremities  Musculoskeletal: No joint swelling or gross deformity. Neuro: Sleepy, normal speech, no focal findings or movement disorder noted  Psych: Normal affect without evidence of depression or anxiety, insight and judgement are appropriate, memory appears intact  Skin: warm and dry. Wound, left hip with dressing intact. ASSESSMENT AND PLAN  1. RA:  Continue on Xylocaine, prednisone 7.5 mg, gabapentin, and Percocet. POC per Dr Miguel Ansari.    2. OA: Notes significant relief, and denies any pain during this visit. Stating that injections to left knee and left hip have been very effective. Continue Dr. Denise Crespo of care. 3. Indigestion/GERD: Denies any s/s. Continue on Nexium  4. Gout: Continue Prednisone and Allopurinol. POC per Dr La Rosado. 5. MDD/hallucinations: Denies worsening depression or any hallucinations. Continue Abilify, BuSpar citalopram. Being seen by [id number].   6. Atrial fibrillation: Continue on Eliquis and Diltiazem. Following with cardio. 7. BPH: Denies issues. Continue on Flomax. 8. Diabetic neuropathy: Continue on Gabapentin. 9. HTN: BP stable. Continue on Diltiazem, Norvasc, Hydralazine, and Lasix. 10. DM2:  Continue on  SSI, Lantus, Janumet XR. 11. CHF: No s/s of exacerbation. Continue on Lasix. 12. Constipation: Denies. Continue on Miralax. 13. Disposition: Full code. Antipsychotic/Antianxiety/Hypnotic/Psychotropic/Sedation/Antidepressant medications are continued at this time because discontinuation may result in adverse effects or return of concerning behaviors/symptoms.       Plan of care reviewed with Dr Humza Camargo, CNP

## 2020-07-20 ENCOUNTER — OFFICE VISIT (OUTPATIENT)
Dept: PHYSICAL MEDICINE AND REHAB | Age: 50
End: 2020-07-20
Payer: MEDICARE

## 2020-07-20 VITALS
BODY MASS INDEX: 46.38 KG/M2 | SYSTOLIC BLOOD PRESSURE: 132 MMHG | DIASTOLIC BLOOD PRESSURE: 80 MMHG | WEIGHT: 306 LBS | HEIGHT: 68 IN | TEMPERATURE: 97.8 F

## 2020-07-20 PROCEDURE — 99214 OFFICE O/P EST MOD 30 MIN: CPT | Performed by: NURSE PRACTITIONER

## 2020-07-20 PROCEDURE — 3017F COLORECTAL CA SCREEN DOC REV: CPT | Performed by: NURSE PRACTITIONER

## 2020-07-20 PROCEDURE — 1036F TOBACCO NON-USER: CPT | Performed by: NURSE PRACTITIONER

## 2020-07-20 PROCEDURE — G8427 DOCREV CUR MEDS BY ELIG CLIN: HCPCS | Performed by: NURSE PRACTITIONER

## 2020-07-20 PROCEDURE — G8417 CALC BMI ABV UP PARAM F/U: HCPCS | Performed by: NURSE PRACTITIONER

## 2020-07-20 ASSESSMENT — ENCOUNTER SYMPTOMS: BACK PAIN: 1

## 2020-07-20 NOTE — PROGRESS NOTES
[facility]  [address]  Dept: [phone]  Dept Fax: [phone]: [phone]    Visit Date: 7/20/2020    Functionality Assessment/Goals Worksheet     On a scale of 0 (Does not Interfere) to 10 (Completely Interferes)     1. Which number describes how during the past week pain has interfered with       the following:  A. General Activity:  7  B. Mood: 7  C. Walking Ability:  7  D. Normal Work (Includes both work outside the home and housework):  7  E. Relations with Other People:   7  F. Sleep:   7  G. Enjoyment of Life:   7    2. Patient Prefers to Take their Pain Medications:     [x]  On a regular basis   []  Only when necessary    []  Does not take pain medications    3. What are the Patient's Goals/Expectations for Visiting Pain Management? [x]  Learn about my pain    [x]  Receive Medication   [x]  Physical Therapy     []  Treat Depression   [x]  Receive Injections    []  Treat Sleep   []  Deal with Anxiety and Stress   []  Treat Opoid Dependence/Addiction   []  Other:      HPI:   Ulysses Soria is a 44 y.o. male is here today for    Chief Complaint: Lower back pain, joint pain. HPI   FU from Left hip injection from 6/29/2020. Receiving 100% relief of left hip pain from hip injection and continue to receiving that relief. Not complaining of any right hip pain at this time. Main complaint today is pain across lower back radiating across - constant aching pain worse with standing. Denies any radicular pain  Knee pain is improved from knee injections from Rheumatology. Continues to live at [facility]. Is on Percocet 1-2 tabs BID prn at [facility] [place] but states that he has not been using it as much d/t relief from injections     Medications reviewed. Patient denies side effects with medications. Patient states he is taking medications as prescribed.  Dora receiving pain medications from other sources. He denies any ER visits since last visit. Pain scale with out pain medications or at its worst is 4-7/10. Last dose of Percocet was about 3 days ago   Drug screen reviewed from 3/11/2020 and was appropriate    L-xray:   1. Slight thoracolumbar levoscoliosis. Cannot exclude muscle spasm right side. 2. Mild degenerative vertebral body spondylosis scattered in the lumbar spine. No acute fracture seen. Questionable partial fusion right sacroiliac joint. Mild degenerative changes left sacroiliac joint. 3. Moderate degenerative facet joint arthropathy lower 2 lumbar levels. 4. Increased bone density in the right iliac bone inferiorly with associated degenerative changes of the right hip and bony irregularity of the right ischial spine, suggesting possible prior trauma. Clinical correlation recommended. The patientis allergic to pcn [penicillins]. Past Medical History  Nara Browne  has a past medical history of Arthritis, Atrial fibrillation ([address] BPH (benign prostatic hyperplasia), CAD (coronary artery disease), Carpal tunnel syndrome on right, Chronic gout, DM2 (diabetes mellitus, type 2) ([address] GERD (gastroesophageal reflux disease), Heart failure with preserved ejection fraction ([address] History of alcohol abuse, History of osteomyelitis, Hyperlipidemia, Hypertension, essential, Hypogonadism, male, Major depression, Mood disorder ([address] Morbid obesity ([address] Muscle weakness, DURAN (nonalcoholic steatohepatitis), Obstructive sleep apnea treated with continuous positive airway pressure (CPAP), KIARA (obstructive sleep apnea), and Vitamin D deficiency. Past Surgical History  The patient  has a past surgical history that includes tracheostomy; Foot surgery (Right); and hip surgery (Left, 6/29/2020). Family History  This patient's family history includes Cancer in his paternal aunt; Heart Disease in his mother.     Social History  Nara Browne  reports that he has never smoked. He has never used smokeless tobacco. He reports previous alcohol use. He reports that he does not use drugs.     Medications    Current Outpatient Medications:     oxyCODONE-acetaminophen (PERCOCET) 5-325 MG per tablet, Take 2 tablets by mouth every 12 hours as needed for Pain., Disp: , Rfl:     busPIRone (BUSPAR) 10 MG tablet, Take 10 mg by mouth 3 times daily, Disp: , Rfl:     Trolamine Salicylate (ASPERCREME) 10 % LOTN, Apply topically as needed for Pain, Disp: 1 Bottle, Rfl: 0    pantoprazole (PROTONIX) 40 MG tablet, Take 1 tablet by mouth daily, Disp: 5 tablet, Rfl: 0    allopurinol (ZYLOPRIM) 300 MG tablet, Take 1 tablet by mouth daily, Disp: 90 tablet, Rfl: 0    allopurinol (ZYLOPRIM) 100 MG tablet, Take 2 tablets by mouth daily, Disp: 60 tablet, Rfl: 0    lidocaine (XYLOCAINE) 5 % ointment, Apply topically as needed to painful areas on lower back, hips, knees, and feet, Disp: 1 Tube, Rfl: 2    vitamin D 25 MCG (1000 UT) CAPS, Take by mouth, Disp: , Rfl:     esomeprazole Magnesium (NEXIUM) 20 MG PACK, Take 20 mg by mouth daily, Disp: , Rfl:     vitamin C (ASCORBIC ACID) 500 MG tablet, Take 500 mg by mouth daily, Disp: , Rfl:     hydrALAZINE (APRESOLINE) 50 MG tablet, Take 50 mg by mouth 3 times daily, Disp: , Rfl:     Wound Dressings (MAXORB EX), Apply topically, Disp: , Rfl:     polyethylene glycol (GLYCOLAX) powder, Take 17 g by mouth daily, Disp: , Rfl:     Multiple Vitamins-Minerals (THERAPEUTIC MULTIVITAMIN-MINERALS) tablet, Take 1 tablet by mouth daily, Disp: , Rfl:     Diaper Rash Products (PINXAV) OINT, Apply topically, Disp: , Rfl:     predniSONE (DELTASONE) 20 MG tablet, Take 7.5 mg by mouth daily , Disp: , Rfl:     Probiotic Product (SOBIA-BID PROBIOTIC PO), Take by mouth, Disp: , Rfl:     ondansetron (ZOFRAN) 4 MG tablet, Take 4 mg by mouth every 8 hours as needed for Nausea or Vomiting, Disp: , Rfl:     diltiazem (CARDIZEM) 60 MG tablet, Take 60 mg by mouth 2 Normocephalic and atraumatic. Right Ear: External ear normal.      Left Ear: External ear normal.      Nose: Nose normal.      Mouth/Throat:      Mouth: Mucous membranes are moist.      Pharynx: Oropharynx is clear. Eyes:      General:         Right eye: No discharge. Extraocular Movements: Extraocular movements intact. Conjunctiva/sclera: Conjunctivae normal.      Pupils: Pupils are equal, round, and reactive to light. Neck:      Musculoskeletal: Normal range of motion and neck supple. Cardiovascular:      Rate and Rhythm: Normal rate and regular rhythm. Pulses: Decreased pulses. Pulmonary:      Effort: Pulmonary effort is normal.      Breath sounds: Normal breath sounds. Abdominal:      General: Abdomen is flat. Bowel sounds are normal.   Musculoskeletal:         General: Tenderness present. Right shoulder: He exhibits decreased range of motion. Left shoulder: He exhibits decreased range of motion. Right wrist: He exhibits decreased range of motion. Right hip: He exhibits decreased range of motion, decreased strength, tenderness and bony tenderness. Left hip: He exhibits decreased range of motion, decreased strength, tenderness and bony tenderness. Right knee: He exhibits decreased range of motion and swelling. Tenderness found. Left knee: He exhibits decreased range of motion and swelling. Tenderness found. Right ankle: He exhibits decreased range of motion and swelling. Tenderness. Left ankle: He exhibits decreased range of motion and swelling. Tenderness. Cervical back: He exhibits decreased range of motion, tenderness and bony tenderness. Lumbar back: He exhibits decreased range of motion, tenderness, bony tenderness and pain. Back:       Right hand: He exhibits decreased range of motion. Right upper leg: He exhibits tenderness and bony tenderness.       Right lower leg: He exhibits tenderness, bony tenderness and swelling. 2+ Pitting Edema present. Left lower leg: He exhibits tenderness, bony tenderness and swelling. 2+ Pitting Edema present. Left foot: Decreased range of motion. Tenderness present. Skin:     General: Skin is warm and dry. Neurological:      General: No focal deficit present. Mental Status: He is alert and oriented to person, place, and time. Sensory: Sensory deficit present. Motor: Weakness present. Coordination: Coordination abnormal.      Gait: Gait abnormal.      Deep Tendon Reflexes:      Reflex Scores:       Tricep reflexes are 2+ on the right side and 2+ on the left side. Bicep reflexes are 2+ on the right side and 2+ on the left side. Brachioradialis reflexes are 2+ on the right side and 2+ on the left side. Patellar reflexes are 1+ on the right side and 1+ on the left side. Achilles reflexes are 1+ on the right side and 1+ on the left side. Comments: Strength 4/5 bilateral lower extremities     Numbness and decreased sensation bilateral feet. Psychiatric:         Mood and Affect: Mood normal.       BIGG test: + bilaterally   Yeomans test: + bilaterally   Gaenslen test: + bilaterally      Assessment:     1. Spondylosis of lumbar region without myelopathy or radiculopathy    2. Lumbar facet arthropathy    3. Chronic bilateral low back pain without sciatica    4. Primary osteoarthritis of both hips    5. Arthralgia, unspecified joint    6. Rheumatoid arthritis involving multiple sites, unspecified rheumatoid factor presence ([facility] [address]    7. Chronic pain syndrome            Plan:      · OARRS reviewed. Current MED: 30.00  · Patient was offered naloxone for home. · Discussed long term side effects of medications, tolerance, dependency and addiction. · Previous UDS reviewed  · UDS preformed today for compliance. · Patient told can not receive any pain medications from any other source.   · No evidence of abuse, diversion or aberrant behavior.  Medications and/or procedures to improve function and quality of life- patient understanding with this and that may not be pain free   Discussed with patient about safe storage of medications at home   Discussed possible weaning of medication dosing dependent on treatment/procedure results.  Discussed with patient about risks with procedure including infection, reaction to medication, increased pain, or bleeding.  Procedure notes reveiwed in detail.  Receiving 100% relief of left hip pain from injection. Denies right hip pain today.  Reviewed L-xray in detail   Plan bilateral L-facet MBB # 1 @ L4-5 and L5-S1 for diagnotic. Procedure and risks discussed in detail with patient. · Continue Percocet prn at [facility], continue lidocaine ointment   · Continue to follow with Rheumatology       Meds. Prescribed:   No orders of the defined types were placed in this encounter. Return for bilateral L-facet MBB # 1 @ L4-5 and L5-S1. , follow up after procedure. Time spent with patient was 25 minutes, more than 50% was spent Counseling/coordinated the patient'scare.       Electronically signed by SANDRA Castellano CNP on7/20/2020 at 11:20 AM

## 2020-08-05 ENCOUNTER — HOSPITAL ENCOUNTER (INPATIENT)
Age: 50
LOS: 2 days | Discharge: NURSING, REHAB OR LONG-TERM CARE | DRG: 871 | End: 2020-08-07
Attending: PHYSICIAN ASSISTANT | Admitting: FAMILY MEDICINE
Payer: MEDICARE

## 2020-08-05 ENCOUNTER — TELEPHONE (OUTPATIENT)
Dept: PHYSICAL MEDICINE AND REHAB | Age: 50
End: 2020-08-05

## 2020-08-05 ENCOUNTER — APPOINTMENT (OUTPATIENT)
Dept: GENERAL RADIOLOGY | Age: 50
DRG: 871 | End: 2020-08-05
Payer: MEDICARE

## 2020-08-05 PROBLEM — U07.1 COVID-19: Status: ACTIVE | Noted: 2020-08-05

## 2020-08-05 LAB
ABO: NORMAL
ALBUMIN SERPL-MCNC: 3.6 G/DL (ref 3.5–5.1)
ALBUMIN SERPL-MCNC: 3.9 G/DL (ref 3.5–5.1)
ALLEN TEST: POSITIVE
ALLEN TEST: POSITIVE
ALP BLD-CCNC: 83 U/L (ref 38–126)
ALP BLD-CCNC: 87 U/L (ref 38–126)
ALT SERPL-CCNC: 77 U/L (ref 11–66)
ALT SERPL-CCNC: 87 U/L (ref 11–66)
ANION GAP SERPL CALCULATED.3IONS-SCNC: 10 MEQ/L (ref 8–16)
ANION GAP SERPL CALCULATED.3IONS-SCNC: 12 MEQ/L (ref 8–16)
ANTIBODY SCREEN: NORMAL
APTT: 33.8 SECONDS (ref 22–38)
AST SERPL-CCNC: 127 U/L (ref 5–40)
AST SERPL-CCNC: 74 U/L (ref 5–40)
BACTERIA: ABNORMAL /HPF
BASE EXCESS (CALCULATED): 4 MMOL/L (ref -2.5–2.5)
BASE EXCESS (CALCULATED): 4.8 MMOL/L (ref -2.5–2.5)
BASOPHILS # BLD: 0.2 %
BASOPHILS # BLD: 0.4 %
BASOPHILS ABSOLUTE: 0 THOU/MM3 (ref 0–0.1)
BASOPHILS ABSOLUTE: 0.1 THOU/MM3 (ref 0–0.1)
BILIRUB SERPL-MCNC: 0.2 MG/DL (ref 0.3–1.2)
BILIRUB SERPL-MCNC: 0.2 MG/DL (ref 0.3–1.2)
BILIRUBIN DIRECT: < 0.2 MG/DL (ref 0–0.3)
BILIRUBIN URINE: NEGATIVE
BLOOD, URINE: NEGATIVE
BUN BLDV-MCNC: 19 MG/DL (ref 7–22)
BUN BLDV-MCNC: 20 MG/DL (ref 7–22)
C-REACTIVE PROTEIN: 3.67 MG/DL (ref 0–1)
CALCIUM SERPL-MCNC: 9 MG/DL (ref 8.5–10.5)
CALCIUM SERPL-MCNC: 9.1 MG/DL (ref 8.5–10.5)
CASTS 2: ABNORMAL /LPF
CASTS UA: ABNORMAL /LPF
CHARACTER, URINE: CLEAR
CHLORIDE BLD-SCNC: 101 MEQ/L (ref 98–111)
CHLORIDE BLD-SCNC: 101 MEQ/L (ref 98–111)
CO2: 26 MEQ/L (ref 23–33)
CO2: 29 MEQ/L (ref 23–33)
COLLECTED BY:: ABNORMAL
COLLECTED BY:: ABNORMAL
COLOR: YELLOW
CREAT SERPL-MCNC: 1.1 MG/DL (ref 0.4–1.2)
CREAT SERPL-MCNC: 1.3 MG/DL (ref 0.4–1.2)
CRYSTALS, UA: ABNORMAL
D-DIMER QUANTITATIVE: 372 NG/ML FEU (ref 0–500)
DEVICE: ABNORMAL
DEVICE: ABNORMAL
EKG ATRIAL RATE: 105 BPM
EKG P AXIS: 47 DEGREES
EKG P-R INTERVAL: 148 MS
EKG Q-T INTERVAL: 350 MS
EKG QRS DURATION: 84 MS
EKG QTC CALCULATION (BAZETT): 462 MS
EKG R AXIS: -23 DEGREES
EKG T AXIS: 65 DEGREES
EKG VENTRICULAR RATE: 105 BPM
EOSINOPHIL # BLD: 0 %
EOSINOPHIL # BLD: 0.6 %
EOSINOPHILS ABSOLUTE: 0 THOU/MM3 (ref 0–0.4)
EOSINOPHILS ABSOLUTE: 0.1 THOU/MM3 (ref 0–0.4)
EPITHELIAL CELLS, UA: ABNORMAL /HPF
ERYTHROCYTE [DISTWIDTH] IN BLOOD BY AUTOMATED COUNT: 17.5 % (ref 11.5–14.5)
ERYTHROCYTE [DISTWIDTH] IN BLOOD BY AUTOMATED COUNT: 18 % (ref 11.5–14.5)
ERYTHROCYTE [DISTWIDTH] IN BLOOD BY AUTOMATED COUNT: 61.5 FL (ref 35–45)
ERYTHROCYTE [DISTWIDTH] IN BLOOD BY AUTOMATED COUNT: 62.4 FL (ref 35–45)
FERRITIN: 133 NG/ML (ref 22–322)
FIBRINOGEN: 412 MG/100ML (ref 155–475)
GFR SERPL CREATININE-BSD FRML MDRD: 70 ML/MIN/1.73M2
GFR SERPL CREATININE-BSD FRML MDRD: 85 ML/MIN/1.73M2
GLUCOSE BLD-MCNC: 140 MG/DL (ref 70–108)
GLUCOSE BLD-MCNC: 227 MG/DL (ref 70–108)
GLUCOSE BLD-MCNC: 263 MG/DL (ref 70–108)
GLUCOSE BLD-MCNC: 275 MG/DL (ref 70–108)
GLUCOSE URINE: NEGATIVE MG/DL
HCO3: 33 MMOL/L (ref 23–28)
HCO3: 35 MMOL/L (ref 23–28)
HCT VFR BLD CALC: 42.7 % (ref 42–52)
HCT VFR BLD CALC: 46.3 % (ref 42–52)
HEMOGLOBIN: 13.2 GM/DL (ref 14–18)
HEMOGLOBIN: 14.5 GM/DL (ref 14–18)
IFIO2: 2
IFIO2: 40
IMMATURE GRANS (ABS): 0.06 THOU/MM3 (ref 0–0.07)
IMMATURE GRANS (ABS): 0.08 THOU/MM3 (ref 0–0.07)
IMMATURE GRANULOCYTES: 0.5 %
IMMATURE GRANULOCYTES: 0.6 %
INR BLD: 1.14 (ref 0.85–1.13)
KETONES, URINE: NEGATIVE
LACTIC ACID: 1.1 MMOL/L (ref 0.5–2.2)
LACTIC ACID: 2.4 MMOL/L (ref 0.5–2.2)
LD: 226 U/L (ref 100–190)
LD: 591 U/L (ref 100–190)
LEUKOCYTE ESTERASE, URINE: NEGATIVE
LIPASE: 34.9 U/L (ref 5.6–51.3)
LYMPHOCYTES # BLD: 3.3 %
LYMPHOCYTES # BLD: 4.2 %
LYMPHOCYTES ABSOLUTE: 0.4 THOU/MM3 (ref 1–4.8)
LYMPHOCYTES ABSOLUTE: 0.5 THOU/MM3 (ref 1–4.8)
MCH RBC QN AUTO: 29.5 PG (ref 26–33)
MCH RBC QN AUTO: 29.8 PG (ref 26–33)
MCHC RBC AUTO-ENTMCNC: 30.9 GM/DL (ref 32.2–35.5)
MCHC RBC AUTO-ENTMCNC: 31.3 GM/DL (ref 32.2–35.5)
MCV RBC AUTO: 95.3 FL (ref 80–94)
MCV RBC AUTO: 95.3 FL (ref 80–94)
MISCELLANEOUS 2: ABNORMAL
MODE: ABNORMAL
MONOCYTES # BLD: 2.7 %
MONOCYTES # BLD: 7.4 %
MONOCYTES ABSOLUTE: 0.4 THOU/MM3 (ref 0.4–1.3)
MONOCYTES ABSOLUTE: 1 THOU/MM3 (ref 0.4–1.3)
NITRITE, URINE: NEGATIVE
NUCLEATED RED BLOOD CELLS: 0 /100 WBC
NUCLEATED RED BLOOD CELLS: 0 /100 WBC
O2 SATURATION: 92 %
O2 SATURATION: 98 %
OSMOLALITY CALCULATION: 282.5 MOSMOL/KG (ref 275–300)
PCO2: 67 MMHG (ref 35–45)
PCO2: 73 MMHG (ref 35–45)
PH BLOOD GAS: 7.28 (ref 7.35–7.45)
PH BLOOD GAS: 7.3 (ref 7.35–7.45)
PH UA: 5 (ref 5–9)
PIP: 18 CMH2O
PLATELET # BLD: 183 THOU/MM3 (ref 130–400)
PLATELET # BLD: 208 THOU/MM3 (ref 130–400)
PMV BLD AUTO: 11 FL (ref 9.4–12.4)
PMV BLD AUTO: 11.5 FL (ref 9.4–12.4)
PO2: 111 MMHG (ref 71–104)
PO2: 73 MMHG (ref 71–104)
POTASSIUM REFLEX MAGNESIUM: 5 MEQ/L (ref 3.5–5.2)
POTASSIUM SERPL-SCNC: 5.8 MEQ/L (ref 3.5–5.2)
PROCALCITONIN: 0.46 NG/ML (ref 0.01–0.09)
PROTEIN UA: 30
RBC # BLD: 4.48 MILL/MM3 (ref 4.7–6.1)
RBC # BLD: 4.86 MILL/MM3 (ref 4.7–6.1)
RBC URINE: ABNORMAL /HPF
REASON FOR REJECTION: NORMAL
REJECTED TEST: NORMAL
RENAL EPITHELIAL, UA: ABNORMAL
RH FACTOR: NORMAL
SARS-COV-2, NAAT: DETECTED
SEG NEUTROPHILS: 86.9 %
SEG NEUTROPHILS: 93.2 %
SEGMENTED NEUTROPHILS ABSOLUTE COUNT: 11.2 THOU/MM3 (ref 1.8–7.7)
SEGMENTED NEUTROPHILS ABSOLUTE COUNT: 12.2 THOU/MM3 (ref 1.8–7.7)
SET PEEP: 8 MMHG
SET PRESS SUPP: 10 CMH2O
SODIUM BLD-SCNC: 139 MEQ/L (ref 135–145)
SODIUM BLD-SCNC: 140 MEQ/L (ref 135–145)
SOURCE, BLOOD GAS: ABNORMAL
SOURCE, BLOOD GAS: ABNORMAL
SPECIFIC GRAVITY, URINE: 1.03 (ref 1–1.03)
TOTAL PROTEIN: 6.4 G/DL (ref 6.1–8)
TOTAL PROTEIN: 6.8 G/DL (ref 6.1–8)
TROPONIN T: 0.02 NG/ML
TROPONIN T: < 0.01 NG/ML
TROPONIN T: < 0.01 NG/ML
UROBILINOGEN, URINE: 0.2 EU/DL (ref 0–1)
WBC # BLD: 12.9 THOU/MM3 (ref 4.8–10.8)
WBC # BLD: 13.1 THOU/MM3 (ref 4.8–10.8)
WBC UA: ABNORMAL /HPF
YEAST: ABNORMAL

## 2020-08-05 PROCEDURE — 6360000002 HC RX W HCPCS: Performed by: INTERNAL MEDICINE

## 2020-08-05 PROCEDURE — 86900 BLOOD TYPING SEROLOGIC ABO: CPT

## 2020-08-05 PROCEDURE — 87449 NOS EACH ORGANISM AG IA: CPT

## 2020-08-05 PROCEDURE — 82803 BLOOD GASES ANY COMBINATION: CPT

## 2020-08-05 PROCEDURE — U0002 COVID-19 LAB TEST NON-CDC: HCPCS

## 2020-08-05 PROCEDURE — 93010 ELECTROCARDIOGRAM REPORT: CPT | Performed by: INTERNAL MEDICINE

## 2020-08-05 PROCEDURE — 99223 1ST HOSP IP/OBS HIGH 75: CPT | Performed by: INTERNAL MEDICINE

## 2020-08-05 PROCEDURE — 80053 COMPREHEN METABOLIC PANEL: CPT

## 2020-08-05 PROCEDURE — 96365 THER/PROPH/DIAG IV INF INIT: CPT

## 2020-08-05 PROCEDURE — 2580000003 HC RX 258: Performed by: INTERNAL MEDICINE

## 2020-08-05 PROCEDURE — 2580000003 HC RX 258: Performed by: NURSE PRACTITIONER

## 2020-08-05 PROCEDURE — 93005 ELECTROCARDIOGRAM TRACING: CPT | Performed by: NURSE PRACTITIONER

## 2020-08-05 PROCEDURE — 36600 WITHDRAWAL OF ARTERIAL BLOOD: CPT

## 2020-08-05 PROCEDURE — 87040 BLOOD CULTURE FOR BACTERIA: CPT

## 2020-08-05 PROCEDURE — 82948 REAGENT STRIP/BLOOD GLUCOSE: CPT

## 2020-08-05 PROCEDURE — 85379 FIBRIN DEGRADATION QUANT: CPT

## 2020-08-05 PROCEDURE — 6370000000 HC RX 637 (ALT 250 FOR IP): Performed by: INTERNAL MEDICINE

## 2020-08-05 PROCEDURE — 2060000000 HC ICU INTERMEDIATE R&B

## 2020-08-05 PROCEDURE — 85025 COMPLETE CBC W/AUTO DIFF WBC: CPT

## 2020-08-05 PROCEDURE — 85385 FIBRINOGEN ANTIGEN: CPT

## 2020-08-05 PROCEDURE — 83615 LACTATE (LD) (LDH) ENZYME: CPT

## 2020-08-05 PROCEDURE — 96361 HYDRATE IV INFUSION ADD-ON: CPT

## 2020-08-05 PROCEDURE — 36415 COLL VENOUS BLD VENIPUNCTURE: CPT

## 2020-08-05 PROCEDURE — 86850 RBC ANTIBODY SCREEN: CPT

## 2020-08-05 PROCEDURE — 6360000002 HC RX W HCPCS: Performed by: NURSE PRACTITIONER

## 2020-08-05 PROCEDURE — 99285 EMERGENCY DEPT VISIT HI MDM: CPT

## 2020-08-05 PROCEDURE — 86901 BLOOD TYPING SEROLOGIC RH(D): CPT

## 2020-08-05 PROCEDURE — 86140 C-REACTIVE PROTEIN: CPT

## 2020-08-05 PROCEDURE — 84145 PROCALCITONIN (PCT): CPT

## 2020-08-05 PROCEDURE — 81001 URINALYSIS AUTO W/SCOPE: CPT

## 2020-08-05 PROCEDURE — 85730 THROMBOPLASTIN TIME PARTIAL: CPT

## 2020-08-05 PROCEDURE — 87899 AGENT NOS ASSAY W/OPTIC: CPT

## 2020-08-05 PROCEDURE — 83605 ASSAY OF LACTIC ACID: CPT

## 2020-08-05 PROCEDURE — 85610 PROTHROMBIN TIME: CPT

## 2020-08-05 PROCEDURE — 71045 X-RAY EXAM CHEST 1 VIEW: CPT

## 2020-08-05 PROCEDURE — 94660 CPAP INITIATION&MGMT: CPT

## 2020-08-05 PROCEDURE — 83690 ASSAY OF LIPASE: CPT

## 2020-08-05 PROCEDURE — 82728 ASSAY OF FERRITIN: CPT

## 2020-08-05 PROCEDURE — 84484 ASSAY OF TROPONIN QUANT: CPT

## 2020-08-05 PROCEDURE — 82248 BILIRUBIN DIRECT: CPT

## 2020-08-05 RX ORDER — INSULIN GLARGINE 100 [IU]/ML
10 INJECTION, SOLUTION SUBCUTANEOUS NIGHTLY
Status: DISCONTINUED | OUTPATIENT
Start: 2020-08-05 | End: 2020-08-07 | Stop reason: HOSPADM

## 2020-08-05 RX ORDER — SODIUM CHLORIDE, SODIUM LACTATE, POTASSIUM CHLORIDE, CALCIUM CHLORIDE 600; 310; 30; 20 MG/100ML; MG/100ML; MG/100ML; MG/100ML
INJECTION, SOLUTION INTRAVENOUS CONTINUOUS
Status: DISCONTINUED | OUTPATIENT
Start: 2020-08-05 | End: 2020-08-06

## 2020-08-05 RX ORDER — ATORVASTATIN CALCIUM 40 MG/1
40 TABLET, FILM COATED ORAL NIGHTLY
COMMUNITY

## 2020-08-05 RX ORDER — DEXTROSE MONOHYDRATE 50 MG/ML
100 INJECTION, SOLUTION INTRAVENOUS PRN
Status: DISCONTINUED | OUTPATIENT
Start: 2020-08-05 | End: 2020-08-07 | Stop reason: HOSPADM

## 2020-08-05 RX ORDER — FOLIC ACID 1 MG/1
1 TABLET ORAL DAILY
Status: DISCONTINUED | OUTPATIENT
Start: 2020-08-05 | End: 2020-08-07 | Stop reason: HOSPADM

## 2020-08-05 RX ORDER — SODIUM CHLORIDE 0.9 % (FLUSH) 0.9 %
10 SYRINGE (ML) INJECTION EVERY 12 HOURS SCHEDULED
Status: DISCONTINUED | OUTPATIENT
Start: 2020-08-05 | End: 2020-08-07 | Stop reason: HOSPADM

## 2020-08-05 RX ORDER — TAMSULOSIN HYDROCHLORIDE 0.4 MG/1
0.4 CAPSULE ORAL NIGHTLY
Status: DISCONTINUED | OUTPATIENT
Start: 2020-08-05 | End: 2020-08-07 | Stop reason: HOSPADM

## 2020-08-05 RX ORDER — POLYETHYLENE GLYCOL 3350 17 G/17G
17 POWDER, FOR SOLUTION ORAL EVERY OTHER DAY
Status: DISCONTINUED | OUTPATIENT
Start: 2020-08-05 | End: 2020-08-07 | Stop reason: HOSPADM

## 2020-08-05 RX ORDER — SODIUM CHLORIDE 0.9 % (FLUSH) 0.9 %
10 SYRINGE (ML) INJECTION PRN
Status: DISCONTINUED | OUTPATIENT
Start: 2020-08-05 | End: 2020-08-07 | Stop reason: HOSPADM

## 2020-08-05 RX ORDER — DEXAMETHASONE 4 MG/1
6 TABLET ORAL DAILY
Status: DISCONTINUED | OUTPATIENT
Start: 2020-08-06 | End: 2020-08-07 | Stop reason: HOSPADM

## 2020-08-05 RX ORDER — 0.9 % SODIUM CHLORIDE 0.9 %
30 INTRAVENOUS SOLUTION INTRAVENOUS PRN
Status: DISCONTINUED | OUTPATIENT
Start: 2020-08-05 | End: 2020-08-07 | Stop reason: HOSPADM

## 2020-08-05 RX ORDER — DILTIAZEM HYDROCHLORIDE 60 MG/1
60 TABLET, FILM COATED ORAL 2 TIMES DAILY
Status: DISCONTINUED | OUTPATIENT
Start: 2020-08-05 | End: 2020-08-07 | Stop reason: HOSPADM

## 2020-08-05 RX ORDER — PANTOPRAZOLE SODIUM 40 MG/1
40 TABLET, DELAYED RELEASE ORAL DAILY
Status: DISCONTINUED | OUTPATIENT
Start: 2020-08-05 | End: 2020-08-07 | Stop reason: HOSPADM

## 2020-08-05 RX ORDER — ASCORBIC ACID 500 MG
500 TABLET ORAL 2 TIMES DAILY
Status: DISCONTINUED | OUTPATIENT
Start: 2020-08-05 | End: 2020-08-07 | Stop reason: HOSPADM

## 2020-08-05 RX ORDER — GUAIFENESIN 100 MG/5ML
200 SYRUP ORAL EVERY 4 HOURS PRN
Status: ON HOLD | COMMUNITY
End: 2021-09-09

## 2020-08-05 RX ORDER — ACETAMINOPHEN 325 MG/1
650 TABLET ORAL EVERY 4 HOURS PRN
COMMUNITY

## 2020-08-05 RX ORDER — NICOTINE POLACRILEX 4 MG
15 LOZENGE BUCCAL PRN
Status: DISCONTINUED | OUTPATIENT
Start: 2020-08-05 | End: 2020-08-07 | Stop reason: HOSPADM

## 2020-08-05 RX ORDER — ATORVASTATIN CALCIUM 40 MG/1
40 TABLET, FILM COATED ORAL NIGHTLY
Status: DISCONTINUED | OUTPATIENT
Start: 2020-08-05 | End: 2020-08-07 | Stop reason: HOSPADM

## 2020-08-05 RX ORDER — ACETAMINOPHEN 325 MG/1
650 TABLET ORAL EVERY 6 HOURS PRN
Status: DISCONTINUED | OUTPATIENT
Start: 2020-08-05 | End: 2020-08-07 | Stop reason: HOSPADM

## 2020-08-05 RX ORDER — DEXTROSE MONOHYDRATE 25 G/50ML
12.5 INJECTION, SOLUTION INTRAVENOUS PRN
Status: DISCONTINUED | OUTPATIENT
Start: 2020-08-05 | End: 2020-08-07 | Stop reason: HOSPADM

## 2020-08-05 RX ORDER — ACETAMINOPHEN 650 MG/1
650 SUPPOSITORY RECTAL EVERY 6 HOURS PRN
Status: DISCONTINUED | OUTPATIENT
Start: 2020-08-05 | End: 2020-08-07 | Stop reason: HOSPADM

## 2020-08-05 RX ORDER — CITALOPRAM 20 MG/1
30 TABLET ORAL DAILY
Status: DISCONTINUED | OUTPATIENT
Start: 2020-08-05 | End: 2020-08-07 | Stop reason: HOSPADM

## 2020-08-05 RX ORDER — ARIPIPRAZOLE 15 MG/1
15 TABLET ORAL DAILY
Status: DISCONTINUED | OUTPATIENT
Start: 2020-08-05 | End: 2020-08-07 | Stop reason: HOSPADM

## 2020-08-05 RX ORDER — BUSPIRONE HYDROCHLORIDE 10 MG/1
10 TABLET ORAL 2 TIMES DAILY
Status: DISCONTINUED | OUTPATIENT
Start: 2020-08-05 | End: 2020-08-07 | Stop reason: HOSPADM

## 2020-08-05 RX ORDER — HYDRALAZINE HYDROCHLORIDE 50 MG/1
50 TABLET, FILM COATED ORAL 2 TIMES DAILY
Status: CANCELLED | OUTPATIENT
Start: 2020-08-05

## 2020-08-05 RX ORDER — AMLODIPINE BESYLATE 5 MG/1
5 TABLET ORAL DAILY
Status: CANCELLED | OUTPATIENT
Start: 2020-08-05

## 2020-08-05 RX ORDER — ALLOPURINOL 300 MG/1
300 TABLET ORAL DAILY
Status: DISCONTINUED | OUTPATIENT
Start: 2020-08-05 | End: 2020-08-07 | Stop reason: HOSPADM

## 2020-08-05 RX ORDER — DIAZEPAM 5 MG/1
5 TABLET ORAL EVERY 6 HOURS PRN
Status: DISCONTINUED | OUTPATIENT
Start: 2020-08-05 | End: 2020-08-07 | Stop reason: HOSPADM

## 2020-08-05 RX ORDER — 0.9 % SODIUM CHLORIDE 0.9 %
500 INTRAVENOUS SOLUTION INTRAVENOUS ONCE
Status: COMPLETED | OUTPATIENT
Start: 2020-08-05 | End: 2020-08-05

## 2020-08-05 RX ORDER — DEXAMETHASONE SODIUM PHOSPHATE 4 MG/ML
6 INJECTION, SOLUTION INTRA-ARTICULAR; INTRALESIONAL; INTRAMUSCULAR; INTRAVENOUS; SOFT TISSUE ONCE
Status: COMPLETED | OUTPATIENT
Start: 2020-08-05 | End: 2020-08-05

## 2020-08-05 RX ORDER — GABAPENTIN 400 MG/1
800 CAPSULE ORAL 2 TIMES DAILY
Status: DISCONTINUED | OUTPATIENT
Start: 2020-08-05 | End: 2020-08-07 | Stop reason: HOSPADM

## 2020-08-05 RX ADMIN — SODIUM CHLORIDE 500 ML: 9 INJECTION, SOLUTION INTRAVENOUS at 02:56

## 2020-08-05 RX ADMIN — APIXABAN 5 MG: 5 TABLET, FILM COATED ORAL at 20:16

## 2020-08-05 RX ADMIN — Medication 10 ML: at 20:21

## 2020-08-05 RX ADMIN — TAMSULOSIN HYDROCHLORIDE 0.4 MG: 0.4 CAPSULE ORAL at 20:16

## 2020-08-05 RX ADMIN — CITALOPRAM 30 MG: 20 TABLET, FILM COATED ORAL at 17:36

## 2020-08-05 RX ADMIN — ARIPIPRAZOLE 15 MG: 15 TABLET ORAL at 17:36

## 2020-08-05 RX ADMIN — DEXAMETHASONE SODIUM PHOSPHATE 6 MG: 4 INJECTION, SOLUTION INTRAMUSCULAR; INTRAVENOUS at 04:55

## 2020-08-05 RX ADMIN — SODIUM CHLORIDE 200 MG: 9 INJECTION, SOLUTION INTRAVENOUS at 16:10

## 2020-08-05 RX ADMIN — BUSPIRONE HYDROCHLORIDE 10 MG: 10 TABLET ORAL at 20:17

## 2020-08-05 RX ADMIN — ALLOPURINOL 300 MG: 300 TABLET ORAL at 17:36

## 2020-08-05 RX ADMIN — FOLIC ACID 1 MG: 1 TABLET ORAL at 17:36

## 2020-08-05 RX ADMIN — SODIUM CHLORIDE, POTASSIUM CHLORIDE, SODIUM LACTATE AND CALCIUM CHLORIDE: 600; 310; 30; 20 INJECTION, SOLUTION INTRAVENOUS at 17:30

## 2020-08-05 RX ADMIN — PANTOPRAZOLE SODIUM 40 MG: 40 TABLET, DELAYED RELEASE ORAL at 17:36

## 2020-08-05 RX ADMIN — INSULIN GLARGINE 10 UNITS: 100 INJECTION, SOLUTION SUBCUTANEOUS at 20:17

## 2020-08-05 RX ADMIN — CEFTRIAXONE SODIUM 1 G: 1 INJECTION, POWDER, FOR SOLUTION INTRAMUSCULAR; INTRAVENOUS at 15:35

## 2020-08-05 RX ADMIN — OXYCODONE HYDROCHLORIDE AND ACETAMINOPHEN 500 MG: 500 TABLET ORAL at 20:16

## 2020-08-05 RX ADMIN — INSULIN LISPRO 4 UNITS: 100 INJECTION, SOLUTION INTRAVENOUS; SUBCUTANEOUS at 18:11

## 2020-08-05 RX ADMIN — DILTIAZEM HYDROCHLORIDE 60 MG: 60 TABLET, FILM COATED ORAL at 20:17

## 2020-08-05 RX ADMIN — ATORVASTATIN CALCIUM 40 MG: 40 TABLET, FILM COATED ORAL at 20:17

## 2020-08-05 RX ADMIN — GABAPENTIN 800 MG: 400 CAPSULE ORAL at 20:16

## 2020-08-05 RX ADMIN — AZITHROMYCIN 500 MG: 500 INJECTION, POWDER, LYOPHILIZED, FOR SOLUTION INTRAVENOUS at 04:56

## 2020-08-05 ASSESSMENT — PAIN DESCRIPTION - LOCATION: LOCATION: CHEST

## 2020-08-05 ASSESSMENT — PAIN DESCRIPTION - DESCRIPTORS: DESCRIPTORS: TIGHTNESS;PRESSURE

## 2020-08-05 ASSESSMENT — PAIN DESCRIPTION - PAIN TYPE: TYPE: ACUTE PAIN

## 2020-08-05 ASSESSMENT — PAIN DESCRIPTION - FREQUENCY: FREQUENCY: CONTINUOUS

## 2020-08-05 NOTE — ED TRIAGE NOTES
Pt presents to the ED via EMS from [facility] with c/o SOB, fever and headache. Nursing home staff reports that pt has had a fever for the past 12 hours. Pt was also SOB and was 89% on RA. Pt last received tylenol at 17:30 and two prescribed percocets at 19:30. Pt is instructed to wear CPAP at night but nursing home staff state that her refuses. VSS, respirations even and unlabored.

## 2020-08-05 NOTE — FLOWSHEET NOTE
reached out to pt again to see if pt had the [address] [employer] phone number. Pt gave this  the phone #:  [phone].  spoke with the  and he was aware of the patient wanting to make him an agent. [employer] wanted to know in more detail what these duties were. Bernice Waller is agreeable to be made an agent as a Medical Power of .  will need to follow up tomorrow and complete the forms.

## 2020-08-05 NOTE — ED NOTES
ED to inpatient nurses report    Chief Complaint   Patient presents with    Shortness of Breath    Fever      Present to ED from [facility]  LOC: alert and orientated to name, place, date  Vital signs   Vitals:    08/05/20 0947 08/05/20 1040 08/05/20 1055 08/05/20 1252   BP: 124/79  115/76 110/77   Pulse: 95  88 80   Resp: 20 18 20 18   Temp:       TempSrc:       SpO2: 97%  93% 99%   Weight:       Height:          Oxygen Baseline pap - pt is able to tolerate 6L NC while eating or talking on the phone    Current needs required pap, tele, covid positive Bipap/Cpap Yes  LDAs:   Peripheral IV 08/05/20 Left Forearm (Active)   Site Assessment Clean;Dry; Intact 08/05/20 0830   Line Status Normal saline locked 08/05/20 0830   Dressing Status Clean; Intact;Dry 08/05/20 0830     Mobility: Requires assistance * 1  Pending ED orders: none  Present condition: stable - pt had a breakfast tray and is now requesting a lunch tray, no other needs at this time    Electronically signed by Washington Martel RN on 8/5/2020 at 12:53 PM       Washington Martel RN  08/05/20 9646

## 2020-08-05 NOTE — ED NOTES
Pt transported to [facility] accompanied by resp. theropy.      Yonas Aguila, EMT-P  08/05/20 6205

## 2020-08-05 NOTE — ED NOTES
Pt assisted to sit up to side of bed to use urinal. Pt then assisted back to bed, resp remain even and unlabored. Bed rails up X2. Pt informed on updated plan of care and denies needs at this time. Call light in reach, will continue to monitor.            Nazia Dumont RN  08/05/20 4701

## 2020-08-05 NOTE — ACP (ADVANCE CARE PLANNING)
Advance Care Planning     Advance Care Planning Activator (Inpatient)  Conversation Note      Date of ACP Conversation: 8/5/2020    Conversation Conducted with: Patient with Decision Making Capacity    ACP Activator: Amrit Velazquez    \"Who would you like to name as your primary health care decision-maker? \"               Name: Yun Daniel          Relationship:             Phone number: [phone]  \"Can this person be reached easily? \" Yes  \"Who would you like to name as your back-up decision maker? \"   Name: No one           Relationship: None             Phone number: None   \"Can this person be reached easily? \" No    Care Preferences    Ventilation: \"If you were in your present state of health and suddenly became very ill and were unable to breathe on your own, what would your preference be about the use of a ventilator (breathing machine) if it were available to you? \"      Would the patient desire the use of ventilator (breathing machine)?: yes    \"If your health worsens and it becomes clear that your chance of recovery is unlikely, what would your preference be about the use of a ventilator (breathing machine) if it were available to you? \"     Would the patient desire the use of ventilator (breathing machine)?: No      Resuscitation  \"CPR works best to restart the heart when there is a sudden event, like a heart attack, in someone who is otherwise healthy. Unfortunately, CPR does not typically restart the heart for people who have serious health conditions or who are very sick. \"    \"In the event your heart stopped as a result of an underlying serious health condition, would you want attempts to be made to restart your heart (answer \"yes\" for attempt to resuscitate) or would you prefer a natural death (answer \"no\" for do not attempt to resuscitate)? \" yes      [x] Yes   [] No   Educated Patient / Jamas Favre regarding differences between Advance Directives and portable DNR orders.     Length of ACP Conversation in minutes:  32 mins    Conversation Outcomes:  [x] ACP discussion completed  [] Existing advance directive reviewed with patient; no changes to patient's previously recorded wishes  [] New Advance Directive completed  [] Portable Do Not Rescitate prepared for Provider review and signature  [] POLST/POST/MOLST/MOST prepared for Provider review and signature      Follow-up plan:    [x] Schedule follow-up conversation to continue planning  [x] Referred individual to Provider for additional questions/concerns   [] Advised patient/agent/surrogate to review completed ACP document and update if needed with changes in condition, patient preferences or care setting    [] This note routed to one or more involved healthcare providers     - Patient explained his last two years of life have been very difficult. He came to this area because of a woman and that relationship no longer is viable. His emergency contact would be represented by his . He shared of the value of his laura community in helping him during the difficult times. - Patient came from [facility] and explained that the facility thought he had COVID so they sent him in for a check. Patient tested COVID (+). He is planning to be admitted. - Discussed his ability to create an Advance Directive while he is here so that he would have people to speak on his behalf if he is unable to. He expressed understanding and a willingness to complete if admitted. A consult will be placed with Spiritual Care when the patient moves to the floor.

## 2020-08-05 NOTE — ED NOTES
Pt resting quietly in room no needs expressed. Side rails up x2 with call light in reach. Will continue to monitor.        Kwame Rivera RN  08/05/20 7156

## 2020-08-05 NOTE — FLOWSHEET NOTE
Pt's family was contacted for prayer for him. Prayer and encouragement was nurtured. 08/05/20 6895   Encounter Summary   Services provided to: Family   Referral/Consult From: [address] [place] [address] Family members   Continue Visiting Yes  (8/5 F)   Complexity of Encounter Low   Length of Encounter 15 minutes   Routine   Type Initial   Assessment Approachable;Calm   Intervention [place];Prayer;Nurtured hope;Empowerment   Outcome Acceptance;Expressed gratitude;Encouraged; Hopeful

## 2020-08-05 NOTE — ED NOTES
Pt continues resting on cot with resp even and unlabored. Pt informed on updated plan of care and denies needs at this time. Call light remains in reach, will continue to monitor.       Nkechi Hsu RN  08/05/20 9143

## 2020-08-05 NOTE — ED NOTES
ED to inpatient nurses report    Chief Complaint   Patient presents with    Shortness of Breath    Fever      Present to ED from nursing home  LOC: alert and orientated to name, place, date  Vital signs   Vitals:    08/05/20 0355 08/05/20 0449 08/05/20 0614 08/05/20 0622   BP: 117/73 (!) 129/93 (!) 137/90    Pulse: 93 94 97    Resp: 20 18 15 22   Temp:       TempSrc:       SpO2: 100% 100% 98%    Weight:       Height:          Oxygen Baseline room air    Current needs required 2L  Bipap/Cpap Yes  LDAs:   Peripheral IV 08/05/20 Left Forearm (Active)   Site Assessment Clean;Dry; Intact 08/05/20 0614   Line Status Normal saline locked; Flushed 08/05/20 1932   Dressing Status Clean; Intact;Dry 08/05/20 0614     Mobility: Requires assistance * 1  Pending ED orders: urinalysis  Present condition: stable, resting comfortably    Electronically signed by Lyla Montemayor RN on 8/5/2020 at 6:53 AM       Lyla Montemayor RN  08/05/20 9835

## 2020-08-05 NOTE — ED NOTES
Resp called and pt taken off bipap and placed on 6L NC to eat breakfast and talk on the phone. Pt tolerates NC well, no increased sob noted, pt denies any new or different complaints. Pt informed on updated plan of care, pt denies other needs at this time. Call light remains in reach, will continue to monitor.       Ramon Corey RN  08/05/20 0482

## 2020-08-05 NOTE — ED NOTES
Pt resting quietly in room with eyes closed, no needs expressed. Side rails up x2 with call light in reach. Will continue to monitor.        Casper Gibbons RN  08/05/20 3367

## 2020-08-05 NOTE — ED NOTES
[facility] was called and notified last night and at [phone] today that he is positive for Michelle Savage RN  08/05/20 3369
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
## 2020-08-05 NOTE — ED NOTES
Pt resting quietly in room no needs expressed. Side rails up x2 with call light in reach. Will continue to monitor.        Silvino Joe RN  08/05/20 3300

## 2020-08-05 NOTE — ED NOTES
Pt continues resting on cot, resp are even and unlabored, pt denies complaints at this time. Call light in reach, will continue to monitor.       Rosales Chicas RN  08/05/20 6661

## 2020-08-06 LAB
ALBUMIN SERPL-MCNC: 4.1 G/DL (ref 3.5–5.1)
ALP BLD-CCNC: 83 U/L (ref 38–126)
ALT SERPL-CCNC: 67 U/L (ref 11–66)
ANION GAP SERPL CALCULATED.3IONS-SCNC: 13 MEQ/L (ref 8–16)
APTT: 31.2 SECONDS (ref 22–38)
AST SERPL-CCNC: 45 U/L (ref 5–40)
BASOPHILS # BLD: 0.3 %
BASOPHILS ABSOLUTE: 0 THOU/MM3 (ref 0–0.1)
BILIRUB SERPL-MCNC: 0.2 MG/DL (ref 0.3–1.2)
BUN BLDV-MCNC: 23 MG/DL (ref 7–22)
C-REACTIVE PROTEIN: 4.41 MG/DL (ref 0–1)
CALCIUM SERPL-MCNC: 8.8 MG/DL (ref 8.5–10.5)
CHLORIDE BLD-SCNC: 101 MEQ/L (ref 98–111)
CO2: 28 MEQ/L (ref 23–33)
CREAT SERPL-MCNC: 1 MG/DL (ref 0.4–1.2)
D-DIMER QUANTITATIVE: 229 NG/ML FEU (ref 0–500)
EOSINOPHIL # BLD: 0 %
EOSINOPHILS ABSOLUTE: 0 THOU/MM3 (ref 0–0.4)
ERYTHROCYTE [DISTWIDTH] IN BLOOD BY AUTOMATED COUNT: 17.2 % (ref 11.5–14.5)
ERYTHROCYTE [DISTWIDTH] IN BLOOD BY AUTOMATED COUNT: 60.4 FL (ref 35–45)
FIBRINOGEN: 458 MG/100ML (ref 155–475)
GFR SERPL CREATININE-BSD FRML MDRD: > 90 ML/MIN/1.73M2
GLUCOSE BLD-MCNC: 169 MG/DL (ref 70–108)
GLUCOSE BLD-MCNC: 203 MG/DL (ref 70–108)
GLUCOSE BLD-MCNC: 230 MG/DL (ref 70–108)
GLUCOSE BLD-MCNC: 300 MG/DL (ref 70–108)
GLUCOSE BLD-MCNC: 310 MG/DL (ref 70–108)
HCT VFR BLD CALC: 44.4 % (ref 42–52)
HEMOGLOBIN: 13.8 GM/DL (ref 14–18)
IMMATURE GRANS (ABS): 0.06 THOU/MM3 (ref 0–0.07)
IMMATURE GRANULOCYTES: 0.6 %
INR BLD: 1.11 (ref 0.85–1.13)
LEGIONELLA PNEUMOPHILIA AG, URINE: NEGATIVE
LYMPHOCYTES # BLD: 6.3 %
LYMPHOCYTES ABSOLUTE: 0.7 THOU/MM3 (ref 1–4.8)
MCH RBC QN AUTO: 29.8 PG (ref 26–33)
MCHC RBC AUTO-ENTMCNC: 31.1 GM/DL (ref 32.2–35.5)
MCV RBC AUTO: 95.9 FL (ref 80–94)
MONOCYTES # BLD: 8.6 %
MONOCYTES ABSOLUTE: 0.9 THOU/MM3 (ref 0.4–1.3)
NUCLEATED RED BLOOD CELLS: 0 /100 WBC
PLATELET # BLD: 187 THOU/MM3 (ref 130–400)
PMV BLD AUTO: 11.8 FL (ref 9.4–12.4)
POTASSIUM REFLEX MAGNESIUM: 4.4 MEQ/L (ref 3.5–5.2)
PROCALCITONIN: 0.27 NG/ML (ref 0.01–0.09)
RBC # BLD: 4.63 MILL/MM3 (ref 4.7–6.1)
SEG NEUTROPHILS: 84.2 %
SEGMENTED NEUTROPHILS ABSOLUTE COUNT: 9.2 THOU/MM3 (ref 1.8–7.7)
SODIUM BLD-SCNC: 142 MEQ/L (ref 135–145)
STREP PNEUMO AG, UR: NEGATIVE
TOTAL PROTEIN: 6.6 G/DL (ref 6.1–8)
TROPONIN T: < 0.01 NG/ML
WBC # BLD: 10.9 THOU/MM3 (ref 4.8–10.8)

## 2020-08-06 PROCEDURE — 85610 PROTHROMBIN TIME: CPT

## 2020-08-06 PROCEDURE — 6360000002 HC RX W HCPCS: Performed by: INTERNAL MEDICINE

## 2020-08-06 PROCEDURE — 2060000000 HC ICU INTERMEDIATE R&B

## 2020-08-06 PROCEDURE — 6370000000 HC RX 637 (ALT 250 FOR IP): Performed by: PHYSICIAN ASSISTANT

## 2020-08-06 PROCEDURE — 86140 C-REACTIVE PROTEIN: CPT

## 2020-08-06 PROCEDURE — 82948 REAGENT STRIP/BLOOD GLUCOSE: CPT

## 2020-08-06 PROCEDURE — 2700000000 HC OXYGEN THERAPY PER DAY

## 2020-08-06 PROCEDURE — 6370000000 HC RX 637 (ALT 250 FOR IP): Performed by: INTERNAL MEDICINE

## 2020-08-06 PROCEDURE — 2580000003 HC RX 258: Performed by: INTERNAL MEDICINE

## 2020-08-06 PROCEDURE — 84484 ASSAY OF TROPONIN QUANT: CPT

## 2020-08-06 PROCEDURE — 85025 COMPLETE CBC W/AUTO DIFF WBC: CPT

## 2020-08-06 PROCEDURE — 84145 PROCALCITONIN (PCT): CPT

## 2020-08-06 PROCEDURE — 85379 FIBRIN DEGRADATION QUANT: CPT

## 2020-08-06 PROCEDURE — 94660 CPAP INITIATION&MGMT: CPT

## 2020-08-06 PROCEDURE — 99232 SBSQ HOSP IP/OBS MODERATE 35: CPT | Performed by: PHYSICIAN ASSISTANT

## 2020-08-06 PROCEDURE — 85730 THROMBOPLASTIN TIME PARTIAL: CPT

## 2020-08-06 PROCEDURE — 80053 COMPREHEN METABOLIC PANEL: CPT

## 2020-08-06 PROCEDURE — 85385 FIBRINOGEN ANTIGEN: CPT

## 2020-08-06 PROCEDURE — 36415 COLL VENOUS BLD VENIPUNCTURE: CPT

## 2020-08-06 RX ORDER — FUROSEMIDE 40 MG/1
40 TABLET ORAL DAILY
Status: DISCONTINUED | OUTPATIENT
Start: 2020-08-06 | End: 2020-08-07 | Stop reason: HOSPADM

## 2020-08-06 RX ORDER — HYDRALAZINE HYDROCHLORIDE 50 MG/1
50 TABLET, FILM COATED ORAL 2 TIMES DAILY
Status: DISCONTINUED | OUTPATIENT
Start: 2020-08-06 | End: 2020-08-07 | Stop reason: HOSPADM

## 2020-08-06 RX ADMIN — GABAPENTIN 800 MG: 400 CAPSULE ORAL at 21:22

## 2020-08-06 RX ADMIN — Medication 10 ML: at 21:21

## 2020-08-06 RX ADMIN — TAMSULOSIN HYDROCHLORIDE 0.4 MG: 0.4 CAPSULE ORAL at 21:22

## 2020-08-06 RX ADMIN — INSULIN LISPRO 2 UNITS: 100 INJECTION, SOLUTION INTRAVENOUS; SUBCUTANEOUS at 09:16

## 2020-08-06 RX ADMIN — DILTIAZEM HYDROCHLORIDE 60 MG: 60 TABLET, FILM COATED ORAL at 08:35

## 2020-08-06 RX ADMIN — ATORVASTATIN CALCIUM 40 MG: 40 TABLET, FILM COATED ORAL at 21:22

## 2020-08-06 RX ADMIN — HYDRALAZINE HYDROCHLORIDE 50 MG: 50 TABLET, FILM COATED ORAL at 21:21

## 2020-08-06 RX ADMIN — SODIUM CHLORIDE, POTASSIUM CHLORIDE, SODIUM LACTATE AND CALCIUM CHLORIDE: 600; 310; 30; 20 INJECTION, SOLUTION INTRAVENOUS at 06:29

## 2020-08-06 RX ADMIN — APIXABAN 5 MG: 5 TABLET, FILM COATED ORAL at 21:22

## 2020-08-06 RX ADMIN — FOLIC ACID 1 MG: 1 TABLET ORAL at 08:34

## 2020-08-06 RX ADMIN — PANTOPRAZOLE SODIUM 40 MG: 40 TABLET, DELAYED RELEASE ORAL at 08:34

## 2020-08-06 RX ADMIN — APIXABAN 5 MG: 5 TABLET, FILM COATED ORAL at 08:34

## 2020-08-06 RX ADMIN — SODIUM CHLORIDE 100 MG: 9 INJECTION, SOLUTION INTRAVENOUS at 14:44

## 2020-08-06 RX ADMIN — BUSPIRONE HYDROCHLORIDE 10 MG: 10 TABLET ORAL at 21:22

## 2020-08-06 RX ADMIN — CEFTRIAXONE SODIUM 1 G: 1 INJECTION, POWDER, FOR SOLUTION INTRAMUSCULAR; INTRAVENOUS at 15:28

## 2020-08-06 RX ADMIN — ARIPIPRAZOLE 15 MG: 15 TABLET ORAL at 08:34

## 2020-08-06 RX ADMIN — AZITHROMYCIN MONOHYDRATE 500 MG: 500 INJECTION, POWDER, LYOPHILIZED, FOR SOLUTION INTRAVENOUS at 06:30

## 2020-08-06 RX ADMIN — DILTIAZEM HYDROCHLORIDE 60 MG: 60 TABLET, FILM COATED ORAL at 21:22

## 2020-08-06 RX ADMIN — DEXAMETHASONE 6 MG: 4 TABLET ORAL at 08:35

## 2020-08-06 RX ADMIN — FUROSEMIDE 40 MG: 40 TABLET ORAL at 21:21

## 2020-08-06 RX ADMIN — OXYCODONE HYDROCHLORIDE AND ACETAMINOPHEN 500 MG: 500 TABLET ORAL at 21:22

## 2020-08-06 RX ADMIN — INSULIN LISPRO 8 UNITS: 100 INJECTION, SOLUTION INTRAVENOUS; SUBCUTANEOUS at 16:36

## 2020-08-06 RX ADMIN — OXYCODONE HYDROCHLORIDE AND ACETAMINOPHEN 500 MG: 500 TABLET ORAL at 08:35

## 2020-08-06 RX ADMIN — CITALOPRAM 30 MG: 20 TABLET, FILM COATED ORAL at 08:34

## 2020-08-06 RX ADMIN — INSULIN GLARGINE 10 UNITS: 100 INJECTION, SOLUTION SUBCUTANEOUS at 21:29

## 2020-08-06 RX ADMIN — BUSPIRONE HYDROCHLORIDE 10 MG: 10 TABLET ORAL at 08:34

## 2020-08-06 RX ADMIN — GABAPENTIN 800 MG: 400 CAPSULE ORAL at 08:34

## 2020-08-06 RX ADMIN — INSULIN LISPRO 4 UNITS: 100 INJECTION, SOLUTION INTRAVENOUS; SUBCUTANEOUS at 13:03

## 2020-08-06 RX ADMIN — ALLOPURINOL 300 MG: 300 TABLET ORAL at 08:34

## 2020-08-06 NOTE — PLAN OF CARE
Problem: Airway Clearance - Ineffective  Goal: Achieve or maintain patent airway  Outcome: Met This Shift  Note: Patient had no problems with his air way for night shift. Problem: Breathing Pattern - Ineffective  Goal: Ability to achieve and maintain a regular respiratory rate  Outcome: Met This Shift  Note: Patient's respirations even and unlabored. Problem: Body Temperature -  Risk of, Imbalanced  Goal: Ability to maintain a body temperature within defined limits  Outcome: Met This Shift  Note: Patient maintained  an appropriate body temperature. Problem: Risk for Fluid Volume Deficit  Goal: Maintain absence of muscle cramping  Outcome: Met This Shift  Note: Patient has denied cramps     Problem: Falls - Risk of:  Goal: Will remain free from falls  Outcome: Met This Shift  Goal: Absence of physical injury  Outcome: Met This Shift     Problem: Gas Exchange - Impaired  Goal: Absence of hypoxia  Outcome: Ongoing  Note: Patient maintained oxygen level greater than 92 percent with O2, per nasal cannula     Problem: Body Temperature -  Risk of, Imbalanced  Goal: Complications related to the disease process, condition or treatment will be avoided or minimized  Outcome: Ongoing     Problem: Isolation Precautions - Risk of Spread of Infection  Goal: Prevent transmission of infection  Outcome: Ongoing  Note: Patient understands the risk of this disease being transmitted and understands the need for isolation, and the door to be closed. Problem: Risk for Fluid Volume Deficit  Goal: Maintain normal heart rhythm  Outcome: Ongoing  Note: Patient receiving IV fluids. Patient is urinating. Goal: Maintain normal serum potassium, sodium, calcium, phosphorus, and pH  Outcome: Ongoing  Note: Ongoing     Problem: Loneliness or Risk for Loneliness  Goal: Demonstrate positive use of time alone when socialization is not possible  Outcome: Ongoing  Note: Patient made multiple phone calls during the shift.   Patient is cheerful and seems to be in good spirit. Patient believes he got here just in time. Problem: Fatigue  Goal: Verbalize increase energy and improved vitality  Outcome: Ongoing  Note: Patient is in acute state of illness. Patient agrees to notify staff of any changes, in energy level or breathing. Problem: Patient Education: Go to Patient Education Activity  Goal: Patient/Family Education  Outcome: Ongoing     Problem: Body Temperature -  Risk of, Imbalanced  Goal: Will regain or maintain usual level of consciousness  Note: Patient maintained a usual level of consciousness. Patient spent much of the morning on the phone talking with friends. Problem: Nutrition Deficits  Goal: Optimize nutrtional status  Note: Patient's blood sugar elevated. Patient states he likes to eat, and that he has given up any hope of losing weight, as he does not have a desire to lose weight greater than his desire to eat. Patient participated in care planand goal setting.

## 2020-08-06 NOTE — CARE COORDINATION
8/5/20, 2:19 PM EDT  Discharge Planning Evaluation  Social work consult received, patient from [facility]. Patient preference is to return to [facility]. The patient's current payor source at the facility is Medicaid. Medicare skilled days available: [address] precert:  YES  Spoke with Lilly at the facility. Patient bed hold: Medicaid bed hold. Anticipated transport plan: To be determined. Do they require COVID 19 test to return to ECF: ecf to check on policy and call sw back. Is there a required time frame which which COVID test needs done: ecf checking. DASIA spoke with pt, states was admitted to [facility] one year ago after getting Gout. Pt states he is planning to be discharged from [facility] at the end of the month. Pt just signed on his apartment at the [address] yesterday.
Info/Orders/Treatment Plan: To ED with c/o SOB, fever and headache. Covid +, Tmax 101.7, oxygen 4L/min NC with sats 98%, diabetic management with ISS, IVF, IV Remdesivir, Azithromycin /Rocephin IV, Decadron IV, telemetry, hold Lasix, follow labs, Tmax 98.6. Weaned to room air this am; sats 95%. Diet: DIET CARB CONTROL;   Smoking status:  reports that he has never smoked. He has never used smokeless tobacco.   PCP: Josi Abad DO  Readmission 30 days or less: no  Readmission Risk Score: 26%    Discharge Planning Evaluation  Current Residence:  Nursing Home  Living Arrangements:  Friends   Support Systems:  Friends/Neighbors  Current Services PTA:     Potential Assistance Needed:  Skilled Nursing Facility  Potential Assistance Purchasing Medications:  No  Does patient want to participate in local refill/ meds to beds program?  No  Type of Home Care Services:  Nursing Services  Patient expects to be discharged to:  [facility]  Expected Discharge date:  08/10/20  Follow Up Appointment: Best Day/ Time: Monday AM    Patient Goals/Plan/Treatment Preferences: Patient is from [facility] ECF; SW following. Has CPAP but stated to physician that he does not wear his CPAP at night. Transportation/Food Security/Housekeeping Addressed:  No issues identified.     Evaluation: yes

## 2020-08-06 NOTE — PROGRESS NOTES
Hospitalist Progress Note      Patient:  Sylvia Jacinto    Unit/Bed:4B-07/007-A  YOB: 1970  MRN: [id number]   Acct: [de-identified]   PCP: Tamir Ta DO  Date of Admission: 8/5/2020    Assessment/Plan:    1. COVID 19 infection / sepsis: Febrile to 103 outpatient, up to 101 here. CXR not impressive, negative for disease on report. Inflammatory/organ markers also elevated with elevated CRP, lactated dehydrogenase, d-dimer, troponin, transaminases. Patient was started on Remdesivir, day 2/5. Continue daily Decadron 6 mg daily (continue to hold home Prednisone). 2. Acute combined hypoxic and hypercarbic respiratory failure: secondary to above with concomitant morbid obesity. Pt found to have elevated PCT however CXR showed \"no acute cardiopulmonary disease\". Pt started on empiric abx therapy, will continue for now. 3. Hyperkalemia, resolved: Likey secondary to KRISTY. Mild, no changes on EKG.  Will follow with repeat labs. Telemetry  4. KRISTY: unclear etiology, suspect pre-renal. Resolved s/p IVFs, continue to monitor and avoid nephrotoxic agents. 5. Elevated troponin: Suspect due to COVID-19 infection. Continue to monitor. No acute changes on EKG or chest pain. 6. Morbid obesity: BMI 45.59  7. HLD: statin   8. Acute on chronic macrocytic anemia: Hgb stable at 13.8, no signs of gross bleeding. Continue to monitor and transfuse for Hgb < 7 or hemodynamic instability. 9. Anxiety/Depression: Continue BuSpar/SSRI/Abilify.  Monitor for signs of serotonin syndrome. 10. A-fib on OAC: rate controlled with Cardizem, continue. Continue Eliquis. Currently NSR on tele. 11. KIARA not on CPAP: pt has CPAP but reports he does not wear it  12. Disposition: pt will require precert to return to [facility].     Chief Complaint: Shortness of breath, fever    Initial H and P:-    'Patient is a 55-year-old male with a past medical history of obesity, KIARA, anxiety depression, hypertension, hyperlipidemia, rheumatoid arthritis, Type 2 diabetes, atrial fibrillation. Patient states for the last couple of days he has developed a fever and shortness of breath. He states his biggest complaint is that he feels hot. He states that this is been going on for approximately 2 to 3 days. He lives at [facility] and was notified that there have been COVID positive individuals at the building. Patient was 89% on room air and was placed on 2 L nasal cannula on arrival.  He has been in the ED all morning and has progressively increase his oxygen demands to 6 L nasal cannula. He was briefly on BiPAP for respiratory acidosis in which his PCO2 was in the 70s. He improved to a PCO2 in the 60s with a pH improvement from 7.28-7. 30. He states that he does not wear his CPAP at night. He was started on Decadron in the ED as well as empiric antibiotics. \"    Subjective (past 24 hours):   8/6-> pt states that he is feeling much better. Denies feeling SOB, has been weaned down to RA. No CP. Admits to fever/ feeling hot when he came in, denies any fever/chills overnight or at this time. Reports he has been coughing some, but that it is a dry cough and admits to \"scratchy\" feeling in his chest.       Past medical history, family history, social history and allergies reviewed again and is unchanged since admission. ROS (12 point review of systems completed. Pertinent positives noted.  Otherwise ROS is negative)     Medications:  Reviewed    Infusion Medications    dextrose      lactated ringers 75 mL/hr at 08/06/20 0629     Scheduled Medications    dexamethasone  6 mg Oral Daily    sodium chloride flush  10 mL Intravenous 2 times per day    cefTRIAXone (ROCEPHIN) IV  1 g Intravenous Q24H    azithromycin  500 mg Intravenous Q24H    remdesivir IVPB  100 mg Intravenous Q24H    allopurinol  300 mg Oral Daily    apixaban  5 mg Oral BID    ARIPiprazole  15 mg Oral Daily    atorvastatin  40 mg Oral Nightly    busPIRone  10 mg Oral BID    citalopram  30 mg Oral Daily    dilTIAZem  60 mg Oral BID    folic acid  1 mg Oral Daily    gabapentin  800 mg Oral BID    insulin glargine  10 Units Subcutaneous Nightly    pantoprazole  40 mg Oral Daily    polyethylene glycol  17 g Oral Every Other Day    tamsulosin  0.4 mg Oral Nightly    vitamin C  500 mg Oral BID    insulin lispro  0-12 Units Subcutaneous TID WC    insulin lispro  0-6 Units Subcutaneous Nightly     PRN Meds: acetaminophen **OR** acetaminophen, sodium chloride flush, sodium chloride, diazePAM, glucose, dextrose, glucagon (rDNA), dextrose      Intake/Output Summary (Last 24 hours) at 8/6/2020 1605  Last data filed at 8/5/2020 2239  Gross per 24 hour   Intake --   Output 850 ml   Net -850 ml       Diet:  DIET CARB CONTROL; Exam:  /61   Pulse 66   Temp 98 °F (36.7 °C) (Oral)   Resp 20   Ht 5' 8\" (1.727 m)   Wt 299 lb 13.2 oz (136 kg)   SpO2 91%   BMI 45.59 kg/m²   General appearance: Obese, no apparent distress, appears stated age and cooperative. HEENT: Pupils equal, round, and reactive to light. Conjunctivae/corneas clear. Neck: Supple, with full range of motion. No jugular venous distention. Trachea midline. Respiratory:  Normal respiratory effort. Diminished to auscultation, bilaterally without Rales/Wheezes/Rhonchi. Cardiovascular: Regular rate and rhythm with normal S1/S2 without murmurs, rubs or gallops. Abdomen: Soft, non-tender, non-distended with normal bowel sounds. Musculoskeletal: passive and active ROM x 4 extremities. Skin: Skin color, texture, turgor normal.  No rashes or lesions. Neurologic:  Neurovascularly intact without any focal sensory/motor deficits.  Cranial nerves: II-XII intact, grossly non-focal.  Psychiatric: Alert and oriented, thought content appropriate, normal insight  Capillary Refill: Brisk,< 3 seconds   Peripheral Pulses: +2 palpable, equal bilaterally     Labs:   Recent Labs 08/05/20  0252 08/05/20  1413 08/06/20  0348   WBC 12.9* 13.1* 10.9*   HGB 14.5 13.2* 13.8*   HCT 46.3 42.7 44.4    183 187     Recent Labs     08/05/20  0500 08/05/20  1413 08/06/20  0348    140 142   K 5.8* 5.0 4.4    101 101   CO2 26 29 28   BUN 20 19 23*   CREATININE 1.1 1.3* 1.0   CALCIUM 9.0 9.1 8.8     Recent Labs     08/05/20  0500 08/05/20  1413 08/06/20  0348   * 74* 45*   ALT 87* 77* 67*   BILIDIR <0.2  --   --    BILITOT 0.2* 0.2* 0.2*   ALKPHOS 87 83 83     Recent Labs     08/05/20  1413 08/06/20  0348   INR 1.14* 1.11     No results for input(s): Amaryllis Goodell in the last 72 hours. Microbiology:    Blood culture #1:   Lab Results   Component Value Date    BC No growth-preliminary  08/05/2020       Blood culture #2:No results found for: Cassi Monae    Organism:No results found for: ORG    No results found for: LABGRAM    MRSA culture only:No results found for: [facility]    Urine culture: No results found for: LABURIN    Respiratory culture: No results found for: CULTRESP    Aerobic and Anaerobic :  No results found for: LABAERO  No results found for: LABANAE    Urinalysis:      Lab Results   Component Value Date    NITRU NEGATIVE 08/05/2020    WBCUA 0-2 08/05/2020    BACTERIA NONE SEEN 08/05/2020    RBCUA 0-2 08/05/2020    BLOODU NEGATIVE 08/05/2020    GLUCOSEU NEGATIVE 08/05/2020       Radiology:  XR CHEST PORTABLE   Final Result      No acute cardiopulmonary disease. **This report has been created using voice recognition software. It may contain minor errors which are inherent in voice recognition technology. **      Final report electronically signed by Dr. Gregorio Villeda on 8/5/2020 3:40 AM        Xr Chest Portable    Result Date: 8/5/2020  PROCEDURE: XR CHEST PORTABLE CLINICAL INFORMATION: sob. COMPARISON: Chest x-ray dated 5/20/2020 TECHNIQUE: AP Portable chest xray FINDINGS: Lines/tubes/devices: none Lungs/pleura: There are low lung volumes.   No pneumonia, pulmonary edema, or obvious mass. No pleural effusion. No pneumothorax. Heart: Heart size is normal. Mediastinum/andres: No obvious mass or adenopathy. Skeleton: No significant bone or joint abnormality. No acute cardiopulmonary disease. **This report has been created using voice recognition software. It may contain minor errors which are inherent in voice recognition technology. ** Final report electronically signed by Dr. Deirdre Chakraborty on 8/5/2020 3:40 AM      Electronically signed by Petr Guzman PA-C on 8/6/2020 at 4:05 PM

## 2020-08-07 VITALS
WEIGHT: 299.83 LBS | OXYGEN SATURATION: 92 % | RESPIRATION RATE: 18 BRPM | DIASTOLIC BLOOD PRESSURE: 67 MMHG | HEIGHT: 68 IN | HEART RATE: 58 BPM | SYSTOLIC BLOOD PRESSURE: 123 MMHG | TEMPERATURE: 97.1 F | BODY MASS INDEX: 45.44 KG/M2

## 2020-08-07 LAB
ALBUMIN SERPL-MCNC: 3.9 G/DL (ref 3.5–5.1)
ALP BLD-CCNC: 83 U/L (ref 38–126)
ALT SERPL-CCNC: 59 U/L (ref 11–66)
ANION GAP SERPL CALCULATED.3IONS-SCNC: 12 MEQ/L (ref 8–16)
APTT: 24.8 SECONDS (ref 22–38)
AST SERPL-CCNC: 30 U/L (ref 5–40)
BASOPHILS # BLD: 0.1 %
BASOPHILS ABSOLUTE: 0 THOU/MM3 (ref 0–0.1)
BILIRUB SERPL-MCNC: 0.2 MG/DL (ref 0.3–1.2)
BUN BLDV-MCNC: 27 MG/DL (ref 7–22)
C-REACTIVE PROTEIN: 2.06 MG/DL (ref 0–1)
CALCIUM SERPL-MCNC: 9.1 MG/DL (ref 8.5–10.5)
CHLORIDE BLD-SCNC: 102 MEQ/L (ref 98–111)
CO2: 29 MEQ/L (ref 23–33)
CREAT SERPL-MCNC: 1.1 MG/DL (ref 0.4–1.2)
D-DIMER QUANTITATIVE: 239 NG/ML FEU (ref 0–500)
EOSINOPHIL # BLD: 0 %
EOSINOPHILS ABSOLUTE: 0 THOU/MM3 (ref 0–0.4)
ERYTHROCYTE [DISTWIDTH] IN BLOOD BY AUTOMATED COUNT: 17 % (ref 11.5–14.5)
ERYTHROCYTE [DISTWIDTH] IN BLOOD BY AUTOMATED COUNT: 58 FL (ref 35–45)
FIBRINOGEN: 412 MG/100ML (ref 155–475)
FOLATE: > 20 NG/ML (ref 4.8–24.2)
GFR SERPL CREATININE-BSD FRML MDRD: 85 ML/MIN/1.73M2
GLUCOSE BLD-MCNC: 173 MG/DL (ref 70–108)
GLUCOSE BLD-MCNC: 187 MG/DL (ref 70–108)
GLUCOSE BLD-MCNC: 204 MG/DL (ref 70–108)
HCT VFR BLD CALC: 45 % (ref 42–52)
HEMOGLOBIN: 14.6 GM/DL (ref 14–18)
IMMATURE GRANS (ABS): 0.09 THOU/MM3 (ref 0–0.07)
IMMATURE GRANULOCYTES: 0.8 %
INR BLD: 1.09 (ref 0.85–1.13)
IRON SATURATION: 18 % (ref 20–50)
IRON: 43 UG/DL (ref 65–195)
LYMPHOCYTES # BLD: 6.3 %
LYMPHOCYTES ABSOLUTE: 0.7 THOU/MM3 (ref 1–4.8)
MCH RBC QN AUTO: 30 PG (ref 26–33)
MCHC RBC AUTO-ENTMCNC: 32.4 GM/DL (ref 32.2–35.5)
MCV RBC AUTO: 92.6 FL (ref 80–94)
MONOCYTES # BLD: 6.7 %
MONOCYTES ABSOLUTE: 0.8 THOU/MM3 (ref 0.4–1.3)
NUCLEATED RED BLOOD CELLS: 0 /100 WBC
PLATELET # BLD: 198 THOU/MM3 (ref 130–400)
PMV BLD AUTO: 10.9 FL (ref 9.4–12.4)
POTASSIUM REFLEX MAGNESIUM: 4.1 MEQ/L (ref 3.5–5.2)
PROCALCITONIN: 0.19 NG/ML (ref 0.01–0.09)
RBC # BLD: 4.86 MILL/MM3 (ref 4.7–6.1)
SEG NEUTROPHILS: 86.1 %
SEGMENTED NEUTROPHILS ABSOLUTE COUNT: 10 THOU/MM3 (ref 1.8–7.7)
SODIUM BLD-SCNC: 143 MEQ/L (ref 135–145)
TOTAL IRON BINDING CAPACITY: 244 UG/DL (ref 171–450)
TOTAL PROTEIN: 6.6 G/DL (ref 6.1–8)
VITAMIN B-12: 859 PG/ML (ref 211–911)
WBC # BLD: 11.6 THOU/MM3 (ref 4.8–10.8)

## 2020-08-07 PROCEDURE — 85610 PROTHROMBIN TIME: CPT

## 2020-08-07 PROCEDURE — 6360000002 HC RX W HCPCS: Performed by: INTERNAL MEDICINE

## 2020-08-07 PROCEDURE — 6370000000 HC RX 637 (ALT 250 FOR IP): Performed by: PHYSICIAN ASSISTANT

## 2020-08-07 PROCEDURE — 36415 COLL VENOUS BLD VENIPUNCTURE: CPT

## 2020-08-07 PROCEDURE — 86140 C-REACTIVE PROTEIN: CPT

## 2020-08-07 PROCEDURE — 84145 PROCALCITONIN (PCT): CPT

## 2020-08-07 PROCEDURE — 83550 IRON BINDING TEST: CPT

## 2020-08-07 PROCEDURE — 99239 HOSP IP/OBS DSCHRG MGMT >30: CPT | Performed by: PHYSICIAN ASSISTANT

## 2020-08-07 PROCEDURE — 82607 VITAMIN B-12: CPT

## 2020-08-07 PROCEDURE — 85385 FIBRINOGEN ANTIGEN: CPT

## 2020-08-07 PROCEDURE — 82948 REAGENT STRIP/BLOOD GLUCOSE: CPT

## 2020-08-07 PROCEDURE — 83540 ASSAY OF IRON: CPT

## 2020-08-07 PROCEDURE — 2580000003 HC RX 258: Performed by: INTERNAL MEDICINE

## 2020-08-07 PROCEDURE — 85379 FIBRIN DEGRADATION QUANT: CPT

## 2020-08-07 PROCEDURE — 82746 ASSAY OF FOLIC ACID SERUM: CPT

## 2020-08-07 PROCEDURE — 85025 COMPLETE CBC W/AUTO DIFF WBC: CPT

## 2020-08-07 PROCEDURE — 6370000000 HC RX 637 (ALT 250 FOR IP): Performed by: INTERNAL MEDICINE

## 2020-08-07 PROCEDURE — 85730 THROMBOPLASTIN TIME PARTIAL: CPT

## 2020-08-07 PROCEDURE — 80053 COMPREHEN METABOLIC PANEL: CPT

## 2020-08-07 PROCEDURE — 94760 N-INVAS EAR/PLS OXIMETRY 1: CPT

## 2020-08-07 RX ORDER — AZITHROMYCIN 500 MG/1
500 TABLET, FILM COATED ORAL DAILY
Qty: 2 TABLET | Refills: 0 | Status: SHIPPED | OUTPATIENT
Start: 2020-08-08 | End: 2020-08-10

## 2020-08-07 RX ORDER — PREDNISONE 20 MG/1
7.5 TABLET ORAL DAILY
Qty: 14 TABLET | Refills: 0 | Status: ON HOLD
Start: 2020-08-10 | End: 2020-09-15 | Stop reason: HOSPADM

## 2020-08-07 RX ORDER — DEXAMETHASONE 6 MG/1
6 TABLET ORAL DAILY
Qty: 2 TABLET | Refills: 0 | Status: SHIPPED | OUTPATIENT
Start: 2020-08-08 | End: 2020-08-10

## 2020-08-07 RX ADMIN — DEXAMETHASONE 6 MG: 4 TABLET ORAL at 10:20

## 2020-08-07 RX ADMIN — APIXABAN 5 MG: 5 TABLET, FILM COATED ORAL at 10:20

## 2020-08-07 RX ADMIN — FOLIC ACID 1 MG: 1 TABLET ORAL at 10:20

## 2020-08-07 RX ADMIN — AZITHROMYCIN MONOHYDRATE 500 MG: 500 INJECTION, POWDER, LYOPHILIZED, FOR SOLUTION INTRAVENOUS at 06:22

## 2020-08-07 RX ADMIN — INSULIN LISPRO 4 UNITS: 100 INJECTION, SOLUTION INTRAVENOUS; SUBCUTANEOUS at 13:34

## 2020-08-07 RX ADMIN — INSULIN LISPRO 2 UNITS: 100 INJECTION, SOLUTION INTRAVENOUS; SUBCUTANEOUS at 10:21

## 2020-08-07 RX ADMIN — GABAPENTIN 800 MG: 400 CAPSULE ORAL at 10:20

## 2020-08-07 RX ADMIN — OXYCODONE HYDROCHLORIDE AND ACETAMINOPHEN 500 MG: 500 TABLET ORAL at 10:21

## 2020-08-07 RX ADMIN — POLYETHYLENE GLYCOL 3350 17 G: 17 POWDER, FOR SOLUTION ORAL at 10:21

## 2020-08-07 RX ADMIN — DILTIAZEM HYDROCHLORIDE 60 MG: 60 TABLET, FILM COATED ORAL at 10:20

## 2020-08-07 RX ADMIN — ARIPIPRAZOLE 15 MG: 15 TABLET ORAL at 10:21

## 2020-08-07 RX ADMIN — BUSPIRONE HYDROCHLORIDE 10 MG: 10 TABLET ORAL at 10:21

## 2020-08-07 RX ADMIN — HYDRALAZINE HYDROCHLORIDE 50 MG: 50 TABLET, FILM COATED ORAL at 10:20

## 2020-08-07 RX ADMIN — PANTOPRAZOLE SODIUM 40 MG: 40 TABLET, DELAYED RELEASE ORAL at 10:20

## 2020-08-07 RX ADMIN — ALLOPURINOL 300 MG: 300 TABLET ORAL at 10:20

## 2020-08-07 RX ADMIN — FUROSEMIDE 40 MG: 40 TABLET ORAL at 10:20

## 2020-08-07 RX ADMIN — CITALOPRAM 30 MG: 20 TABLET, FILM COATED ORAL at 10:21

## 2020-08-07 ASSESSMENT — ENCOUNTER SYMPTOMS
BACK PAIN: 0
COUGH: 0
SHORTNESS OF BREATH: 1
ABDOMINAL PAIN: 0
VOMITING: 0
RHINORRHEA: 0
NAUSEA: 0
CHEST TIGHTNESS: 0

## 2020-08-07 ASSESSMENT — PAIN SCALES - GENERAL
PAINLEVEL_OUTOF10: 0
PAINLEVEL_OUTOF10: 0

## 2020-08-07 NOTE — PROGRESS NOTES
Report called to [facility]. AVS and last dose [facility] faxed.

## 2020-08-07 NOTE — DISCHARGE SUMMARY
Hospitalist Discharge Summary        Patient: Sayda Corcoran  YOB: 1970  MRN: [id number]   Acct: [de-identified]    Primary Care Physician: Pedrito Caldera DO    Admit date  8/5/2020    Discharge date:  8/7/2020 1:49 PM    Chief Complaint on presentation :-  Shortness of breath, fever    Discharge Assessment and Plan:-     1. COVID 19 infection / sepsis: Febrile to 103 outpatient, up to 101 here. CXR not impressive, negative for disease on report. Inflammatory/organ markers also elevated with elevated CRP, lactated dehydrogenase, d-dimer, troponin, transaminases. Patient was started on Remdesivir, day 3/5. Continue daily Decadron 6 mg daily (continue to hold home Prednisone). 8/7-> patient improved greatly clinically and from respiratory standpoint, no longer requiring supplemental O2. Given patient's fever on admission we will continue Zithromax to complete 5-day course. Discharge with 2 days worth Decadron and then to resume his home daily Prednisone. 2. Acute combined hypoxic and hypercarbic respiratory failure: secondary to above with concomitant morbid obesity. Pt found to have elevated PCT however CXR showed \"no acute cardiopulmonary disease\". Pt started on empiric abx therapy, will continue for now.   8/7-> supplemental O2 weaned down to room air, patient instructed to continue using CPAP at night and with naps. 3. Hyperkalemia, resolved: Likey secondary to KRISTY. Mild, no changes on EKG.  Will follow with repeat labs. Telemetry  4. KRISTY: unclear etiology, suspect pre-renal. Resolved s/p IVFs, continue to monitor and avoid nephrotoxic agents. 5. Elevated troponin: Suspect due to COVID-19 infection. Continue to monitor. No acute changes on EKG or chest pain. 6. Morbid obesity: BMI 45.59  7. HLD: statin   8. Acute on chronic macrocytic anemia, resolved: Hgb stable at 13.8, no signs of gross bleeding. Continue to monitor and transfuse for Hgb < 7 or hemodynamic instability. 9. Anxiety/Depression: Continue BuSpar/SSRI/Abilify.  Monitor for signs of serotonin syndrome. 10. A-fib on OAC: rate controlled with Cardizem, continue. Continue Eliquis. Currently NSR on tele. 11. KIARA not on CPAP: pt has CPAP but reports he does not wear it  12. Disposition: pt will require precert to return to [facility]. Initial H and P and Hospital course:-  'Patient is a 40-year-old male with a past medical history of obesity, KIARA, anxiety depression, hypertension, hyperlipidemia, rheumatoid arthritis, Type 2 diabetes, atrial fibrillation.  Patient states for the last couple of days he has developed a fever and shortness of breath.  He states his biggest complaint is that he feels hot. [facility] states that this is been going on for approximately 2 to 3 days.  [facility] lives [facility] and was notified that there have been COVID positive individuals at the building.  Patient was 89% on room air and was placed on 2 L nasal cannula on arrival. [facility] has been in the ED all morning and has progressively increase his oxygen demands to 6 L nasal cannula.  He was briefly on BiPAP for respiratory acidosis in which his PCO2 was in the 70s.  He improved to a PCO2 in the 60s with a pH improvement from 7.28-7. Ling states that he does not wear his CPAP at night.  He was started on Decadron in the ED as well as empiric antibiotics. \"    8/6-> pt states that he is feeling much better. Denies feeling SOB, has been weaned down to RA. No CP. Admits to fever/ feeling hot when he came in, denies any fever/chills overnight or at this time. Reports he has been coughing some, but that it is a dry cough and admits to \"scratchy\" feeling in his chest.      Subjective (past 24 hours):   8/7-> pt is doing well today, states he wants to return home. Pt denies fever/chills. Reports that he has continued dry, non-productive cough and that it is improving. No CP/palpitations. No abd pain, n/v/d/c.        Patient was admitted zero 8/0 5 with complaint of fever and shortness of breath. Patient lives at [facility] and had been in contact with COVID positive individuals. Patient was found to have 89% on room air and was placed on 2 L nasal cannula and eventually was requiring 6 L nasal cannula due to progressive worsening. Patient was briefly placed on BiPAP given respiratory acidosis noted on ABG with PCO2 in the 70s. Patient was found to have improved with BiPAP therapy on subsequent ABGs. He was admitted and started on rem-severe and Decadron as well as Zithromax empirically. Patient was weaned off supplemental O2 without difficulty and has been on room air. Patient states that he feels much better and denies shortness of breath, he admits to a dry nonproductive cough. He has been afebrile since admission. Patient will be discharged with 2 days of Zithromax and 2 days of Decadron in order to complete total of 5-day course. Patient is to resume his daily prednisone after finishing Decadron course. Patient was also found to have a mild KRISTY at admission which resolved with fluids. VSS and suitable for discharge home and appropriate social distancing and quarantine. Physical Exam:-  Vitals:   Patient Vitals for the past 24 hrs:   BP Temp Temp src Pulse Resp SpO2   08/07/20 1159 137/77 98.4 °F (36.9 °C) Oral 57 18 96 %   08/07/20 1015 138/78 98.5 °F (36.9 °C) Oral 60 18 94 %   08/07/20 0436 (!) 140/80 98.2 °F (36.8 °C) Oral 57 18 92 %   08/07/20 0159 -- -- -- -- 18 93 %   08/07/20 0045 129/75 98.2 °F (36.8 °C) Oral 65 18 92 %   08/06/20 2045 134/83 98.3 °F (36.8 °C) Oral 63 20 91 %   08/06/20 1600 115/65 98.4 °F (36.9 °C) Oral 64 18 92 %     Weight:   Weight: 299 lb 13.2 oz (136 kg)   24 hour intake/output:     Intake/Output Summary (Last 24 hours) at 8/7/2020 1349  Last data filed at 8/7/2020 0800  Gross per 24 hour   Intake 1272 ml   Output --   Net 1272 ml       1.  General appearance: Obese, no apparent distress, appears stated age and cooperative. 2. HEENT: Normal cephalic, atraumatic without obvious deformity. Pupils equal, round, and reactive to light. Extra ocular muscles intact. Conjunctivae/corneas clear. 3. Neck: Supple, with full range of motion. No jugular venous distention. Trachea midline. 4. Respiratory:  Normal respiratory effort. Clear to auscultation, bilaterally without Rales/Wheezes/Rhonchi. 5. Cardiovascular: Regular rate and rhythm with normal S1/S2 without murmurs, rubs or gallops. 6. Abdomen: Soft, non-tender, non-distended with normal bowel sounds. 7. Musculoskeletal:  No clubbing, cyanosis or edema bilaterally. 8. Skin: Skin color, texture, turgor normal.  No rashes or lesions. 9. Neurologic:  Neurovascularly intact without any focal sensory/motor deficits. Cranial nerves: II-XII intact, grossly non-focal.  10. Psychiatric: Alert and oriented, thought content appropriate, normal insight  11. Capillary Refill: Brisk,< 3 seconds   12.  Peripheral Pulses: +2 palpable, equal bilaterally       Discharge Medications:-      Medication List      ASK your doctor about these medications    ABILIFY PO     acetaminophen 325 MG tablet  Commonly known as:  TYLENOL     * allopurinol 300 MG tablet  Commonly known as:  ZYLOPRIM  Take 1 tablet by mouth daily     * allopurinol 100 MG tablet  Commonly known as:  ZYLOPRIM  Take 2 tablets by mouth daily     amLODIPine 10 MG tablet  Commonly known as:  NORVASC     Aspercreme 10 % Lotn  Generic drug:  Trolamine Salicylate  Apply topically as needed for Pain     atorvastatin 40 MG tablet  Commonly known as:  LIPITOR     benzocaine 7.5 % oral gel  Commonly known as:  BABY ORAJEL     busPIRone 10 MG tablet  Commonly known as:  BUSPAR     CELEXA PO     CHOLECALCIFEROL PO  Ask about: Which instructions should I use?     dilTIAZem 60 MG tablet  Commonly known as:  CARDIZEM     Eliquis 5 MG Tabs tablet  Generic drug:  apixaban     esomeprazole Magnesium 20 MG Pack  Commonly known as:  240 [employer] folic acid 1 MG tablet  Commonly known as:  27 [facility]  Patient needs all supplies for qd testing. DX: 250.00     furosemide 40 MG tablet  Commonly known as:  LASIX     gabapentin 400 MG capsule  Commonly known as:  NEURONTIN     guaiFENesin 100 MG/5ML syrup  Commonly known as:  ROBITUSSIN     hydrALAZINE 50 MG tablet  Commonly known as:  APRESOLINE     insulin glargine 100 UNIT/ML injection vial  Commonly known as:  LANTUS     insulin lispro 100 UNIT/ML injection vial  Commonly known as:  HUMALOG     Janumet  MG per tablet  Generic drug:  sitaGLIPtan-metformin     lidocaine 5 % ointment  Commonly known as:  XYLOCAINE  Apply topically as needed to painful areas on lower back, hips, knees, and feet     MAXORB EX     ondansetron 4 MG tablet  Commonly known as:  ZOFRAN     pantoprazole 40 MG tablet  Commonly known as:  PROTONIX  Take 1 tablet by mouth daily     Percocet 5-325 MG per tablet  Generic drug:  oxyCODONE-acetaminophen     Pinxav Oint     polyethylene glycol 17 GM/SCOOP powder  Commonly known as:  GLYCOLAX     predniSONE 20 MG tablet  Commonly known as:  DELTASONE     SOBIA-BID PROBIOTIC PO     senna 8.6 MG tablet  Commonly known as:  SENOKOT     tamsulosin 0.4 MG capsule  Commonly known as:  FLOMAX     therapeutic multivitamin-minerals tablet     Valium 5 MG tablet  Generic drug:  diazePAM     vitamin C 500 MG tablet  Commonly known as:  ASCORBIC ACID         * This list has 2 medication(s) that are the same as other medications prescribed for you. Read the directions carefully, and ask your doctor or other care provider to review them with you.                  Labs :-  Recent Results (from the past 72 hour(s))   EKG 12 Lead    Collection Time: 08/05/20  2:15 AM   Result Value Ref Range    Ventricular Rate 105 BPM    Atrial Rate 105 BPM    P-R Interval 148 ms    QRS Duration 84 ms    Q-T Interval 350 ms    QTc Calculation (Bazett) 462 ms    P Axis 47 degrees    R Axis -23 degrees T Axis 65 degrees   COVID-19    Collection Time: 08/05/20  2:38 AM   Result Value Ref Range    SARS-CoV-2, NAAT DETECTED (AA) NOT DETECT   Culture, Blood 1    Collection Time: 08/05/20  2:52 AM    Specimen: Blood   Result Value Ref Range    Blood Culture, Routine No growth-preliminary     CBC Auto Differential    Collection Time: 08/05/20  2:52 AM   Result Value Ref Range    WBC 12.9 (H) 4.8 - 10.8 thou/mm3    RBC 4.86 4.70 - 6.10 mill/mm3    Hemoglobin 14.5 14.0 - 18.0 gm/dl    Hematocrit 46.3 42.0 - 52.0 %    MCV 95.3 (H) 80.0 - 94.0 fL    MCH 29.8 26.0 - 33.0 pg    MCHC 31.3 (L) 32.2 - 35.5 gm/dl    RDW-CV 18.0 (H) 11.5 - 14.5 %    RDW-SD 62.4 (H) 35.0 - 45.0 fL    Platelets 438 794 - 032 thou/mm3    MPV 11.5 9.4 - 12.4 fL    Seg Neutrophils 86.9 %    Lymphocytes 4.2 %    Monocytes 7.4 %    Eosinophils 0.6 %    Basophils 0.4 %    Immature Granulocytes 0.5 %    Segs Absolute 11.2 (H) 1.8 - 7.7 thou/mm3    Lymphocytes Absolute 0.5 (L) 1.0 - 4.8 thou/mm3    Monocytes Absolute 1.0 0.4 - 1.3 thou/mm3    Eosinophils Absolute 0.1 0.0 - 0.4 thou/mm3    Basophils Absolute 0.1 0.0 - 0.1 thou/mm3    Immature Grans (Abs) 0.06 0.00 - 0.07 thou/mm3    nRBC 0 /100 wbc   Lactic Acid, Plasma    Collection Time: 08/05/20  2:52 AM   Result Value Ref Range    Lactic Acid 1.1 0.5 - 2.2 mmol/L   Blood gas, arterial    Collection Time: 08/05/20  3:12 AM   Result Value Ref Range    pH, Blood Gas 7.28 (L) 7.35 - 7.45    PCO2 73 (HH) 35 - 45 mmhg    PO2 73 71 - 104 mmhg    HCO3 35 (H) 23 - 28 mmol/l    Base Excess (Calculated) 4.8 (H) -2.5 - 2.5 mmol/l    O2 Sat 92 %    IFIO2 2     DEVICE Cannula     Michael Test Positive     Source: R Radial     COLLECTED BY: [id number]    SPECIMEN REJECTION    Collection Time: 08/05/20  3:29 AM   Result Value Ref Range    Rejected Test tropt bmp     Reason for Rejection see below    Culture, Blood 2    Collection Time: 08/05/20  5:00 AM    Specimen: Blood   Result Value Ref Range    Blood Culture, Routine No growth-preliminary     Basic Metabolic Panel    Collection Time: 08/05/20  5:00 AM   Result Value Ref Range    Sodium 139 135 - 145 meq/L    Potassium 5.8 (H) 3.5 - 5.2 meq/L    Chloride 101 98 - 111 meq/L    CO2 26 23 - 33 meq/L    Glucose 140 (H) 70 - 108 mg/dL    BUN 20 7 - 22 mg/dL    CREATININE 1.1 0.4 - 1.2 mg/dL    Calcium 9.0 8.5 - 10.5 mg/dL   Hepatic Function Panel    Collection Time: 08/05/20  5:00 AM   Result Value Ref Range    Alb 3.6 3.5 - 5.1 g/dL    Total Bilirubin 0.2 (L) 0.3 - 1.2 mg/dL    Bilirubin, Direct <0.2 0.0 - 0.3 mg/dL    Alkaline Phosphatase 87 38 - 126 U/L     (H) 5 - 40 U/L    ALT 87 (H) 11 - 66 U/L    Total Protein 6.8 6.1 - 8.0 g/dL   C-Reactive Protein    Collection Time: 08/05/20  5:00 AM   Result Value Ref Range    CRP 3.67 (H) 0.00 - 1.00 mg/dl   Lactate Dehydrogenase    Collection Time: 08/05/20  5:00 AM   Result Value Ref Range     (H) 100 - 190 U/L   Lipase    Collection Time: 08/05/20  5:00 AM   Result Value Ref Range    Lipase 34.9 5.6 - 51.3 U/L   Troponin    Collection Time: 08/05/20  5:00 AM   Result Value Ref Range    Troponin T 0.018 (A) ng/ml   Procalcitonin    Collection Time: 08/05/20  5:00 AM   Result Value Ref Range    Procalcitonin 0.46 (H) 0.01 - 0.09 ng/mL   Anion Gap    Collection Time: 08/05/20  5:00 AM   Result Value Ref Range    Anion Gap 12.0 8.0 - 16.0 meq/L   Glomerular Filtration Rate, Estimated    Collection Time: 08/05/20  5:00 AM   Result Value Ref Range    Est, Glom Filt Rate 85 (A) ml/min/1.73m2   Osmolality    Collection Time: 08/05/20  5:00 AM   Result Value Ref Range    Osmolality Calc 282.5 275.0 - 300 mOsmol/kg   Blood gas, arterial    Collection Time: 08/05/20  6:13 AM   Result Value Ref Range    pH, Blood Gas 7.30 (L) 7.35 - 7.45    PCO2 67 (H) 35 - 45 mmhg    PO2 111 (H) 71 - 104 mmhg    HCO3 33 (H) 23 - 28 mmol/l    Base Excess (Calculated) 4.0 (H) -2.5 - 2.5 mmol/l    O2 Sat 98 %    IFIO2 40     DEVICE BiPAP     Mode BiLevel SET PEEP 8.0 mmhg    SET PRESS SUPP 10 cmH2O    PIP 18 cmH2O    Michael Test Positive     Source: L Radial     COLLECTED BY: [id number]    Urine with Reflexed Micro    Collection Time: 08/05/20  8:15 AM   Result Value Ref Range    Glucose, Ur NEGATIVE NEGATIVE mg/dl    Bilirubin Urine NEGATIVE NEGATIVE    Ketones, Urine NEGATIVE NEGATIVE    Specific Gravity, Urine 1.027 1.002 - 1.03    Blood, Urine NEGATIVE NEGATIVE    pH, UA 5.0 5.0 - 9.0    Protein, UA 30 (A) NEGATIVE    Urobilinogen, Urine 0.2 0.0 - 1.0 eu/dl    Nitrite, Urine NEGATIVE NEGATIVE    Leukocyte Esterase, Urine NEGATIVE NEGATIVE    Color, UA YELLOW STRAW-YELL    Character, Urine CLEAR CLEAR-SL C    RBC, UA 0-2 0-2/hpf /hpf    WBC, UA 0-2 0-4/hpf /hpf    Epithelial Cells, UA 0-2 3-5/hpf /hpf    Bacteria, UA NONE SEEN FEW/NONE S /hpf    Casts UA NONE SEEN NONE SEEN /lpf    Crystals, UA NONE SEEN NONE SEEN    Renal Epithelial, UA NONE SEEN NONE SEEN    Yeast, UA NONE SEEN NONE SEEN    CASTS 2 NONE SEEN NONE SEEN /lpf    MISCELLANEOUS 2 NONE SEEN    Strep Pneumoniae Antigen    Collection Time: 08/05/20  8:15 AM    Specimen: Urine, clean catch   Result Value Ref Range    Strep pneumo Ag, Ur NEGATIVE    Legionella antigen, urine    Collection Time: 08/05/20  8:15 AM    Specimen: Urine, clean catch   Result Value Ref Range    Legionella Pneumophilia Ag, Urine NEGATIVE    CBC Auto Differential    Collection Time: 08/05/20  2:13 PM   Result Value Ref Range    WBC 13.1 (H) 4.8 - 10.8 thou/mm3    RBC 4.48 (L) 4.70 - 6.10 mill/mm3    Hemoglobin 13.2 (L) 14.0 - 18.0 gm/dl    Hematocrit 42.7 42.0 - 52.0 %    MCV 95.3 (H) 80.0 - 94.0 fL    MCH 29.5 26.0 - 33.0 pg    MCHC 30.9 (L) 32.2 - 35.5 gm/dl    RDW-CV 17.5 (H) 11.5 - 14.5 %    RDW-SD 61.5 (H) 35.0 - 45.0 fL    Platelets 915 341 - 416 thou/mm3    MPV 11.0 9.4 - 12.4 fL    Seg Neutrophils 93.2 %    Lymphocytes 3.3 %    Monocytes 2.7 %    Eosinophils 0.0 %    Basophils 0.2 %    Immature Granulocytes 0.6 %    Segs Absolute 12.2 (H) 1.8 - 7.7 thou/mm3    Lymphocytes Absolute 0.4 (L) 1.0 - 4.8 thou/mm3    Monocytes Absolute 0.4 0.4 - 1.3 thou/mm3    Eosinophils Absolute 0.0 0.0 - 0.4 thou/mm3    Basophils Absolute 0.0 0.0 - 0.1 thou/mm3    Immature Grans (Abs) 0.08 (H) 0.00 - 0.07 thou/mm3    nRBC 0 /100 wbc   Comprehensive Metabolic Panel w/ Reflex to MG    Collection Time: 08/05/20  2:13 PM   Result Value Ref Range    Glucose 275 (H) 70 - 108 mg/dL    CREATININE 1.3 (H) 0.4 - 1.2 mg/dL    BUN 19 7 - 22 mg/dL    Sodium 140 135 - 145 meq/L    Potassium reflex Magnesium 5.0 3.5 - 5.2 meq/L    Chloride 101 98 - 111 meq/L    CO2 29 23 - 33 meq/L    Calcium 9.1 8.5 - 10.5 mg/dL    AST 74 (H) 5 - 40 U/L    Alkaline Phosphatase 83 38 - 126 U/L    Total Protein 6.4 6.1 - 8.0 g/dL    Alb 3.9 3.5 - 5.1 g/dL    Total Bilirubin 0.2 (L) 0.3 - 1.2 mg/dL    ALT 77 (H) 11 - 66 U/L   Protime-INR    Collection Time: 08/05/20  2:13 PM   Result Value Ref Range    INR 1.14 (H) 0.85 - 1.13   APTT    Collection Time: 08/05/20  2:13 PM   Result Value Ref Range    aPTT 33.8 22.0 - 38.0 seconds   Fibrinogen    Collection Time: 08/05/20  2:13 PM   Result Value Ref Range    Fibrinogen 412 155 - 475 mg/100ml   Lactate Dehydrogenase    Collection Time: 08/05/20  2:13 PM   Result Value Ref Range     (H) 100 - 190 U/L   Ferritin    Collection Time: 08/05/20  2:13 PM   Result Value Ref Range    Ferritin 133 22 - 322 ng/mL   D-Dimer, Quantitative    Collection Time: 08/05/20  2:13 PM   Result Value Ref Range    D-Dimer, Quant 372.00 0.00 - 500.0 ng/ml FEU   Troponin    Collection Time: 08/05/20  2:13 PM   Result Value Ref Range    Troponin T < 0.010 ng/ml   TYPE AND SCREEN    Collection Time: 08/05/20  2:13 PM   Result Value Ref Range    ABO O     Rh Factor POS     Antibody Screen NEG    Lactic Acid, Plasma    Collection Time: 08/05/20  2:13 PM   Result Value Ref Range    Lactic Acid 2.4 (H) 0.5 - 2.2 mmol/L   Anion Gap    Collection Time: 08/05/20  2:13 PM   Result Value Ref Range    Anion Gap 10.0 8.0 - 16.0 meq/L   Glomerular Filtration Rate, Estimated    Collection Time: 08/05/20  2:13 PM   Result Value Ref Range    Est, Glom Filt Rate 70 (A) ml/min/1.73m2   POCT glucose    Collection Time: 08/05/20  5:43 PM   Result Value Ref Range    POC Glucose 227 (H) 70 - 108 mg/dl   POCT glucose    Collection Time: 08/05/20  8:12 PM   Result Value Ref Range    POC Glucose 263 (H) 70 - 108 mg/dl   Troponin    Collection Time: 08/05/20  9:39 PM   Result Value Ref Range    Troponin T < 0.010 ng/ml   Troponin    Collection Time: 08/06/20  3:48 AM   Result Value Ref Range    Troponin T < 0.010 ng/ml   CBC Auto Differential    Collection Time: 08/06/20  3:48 AM   Result Value Ref Range    WBC 10.9 (H) 4.8 - 10.8 thou/mm3    RBC 4.63 (L) 4.70 - 6.10 mill/mm3    Hemoglobin 13.8 (L) 14.0 - 18.0 gm/dl    Hematocrit 44.4 42.0 - 52.0 %    MCV 95.9 (H) 80.0 - 94.0 fL    MCH 29.8 26.0 - 33.0 pg    MCHC 31.1 (L) 32.2 - 35.5 gm/dl    RDW-CV 17.2 (H) 11.5 - 14.5 %    RDW-SD 60.4 (H) 35.0 - 45.0 fL    Platelets 863 137 - 852 thou/mm3    MPV 11.8 9.4 - 12.4 fL    Seg Neutrophils 84.2 %    Lymphocytes 6.3 %    Monocytes 8.6 %    Eosinophils 0.0 %    Basophils 0.3 %    Immature Granulocytes 0.6 %    Segs Absolute 9.2 (H) 1.8 - 7.7 thou/mm3    Lymphocytes Absolute 0.7 (L) 1.0 - 4.8 thou/mm3    Monocytes Absolute 0.9 0.4 - 1.3 thou/mm3    Eosinophils Absolute 0.0 0.0 - 0.4 thou/mm3    Basophils Absolute 0.0 0.0 - 0.1 thou/mm3    Immature Grans (Abs) 0.06 0.00 - 0.07 thou/mm3    nRBC 0 /100 wbc   Comprehensive Metabolic Panel w/ Reflex to MG    Collection Time: 08/06/20  3:48 AM   Result Value Ref Range    Glucose 203 (H) 70 - 108 mg/dL    CREATININE 1.0 0.4 - 1.2 mg/dL    BUN 23 (H) 7 - 22 mg/dL    Sodium 142 135 - 145 meq/L    Potassium reflex Magnesium 4.4 3.5 - 5.2 meq/L    Chloride 101 98 - 111 meq/L    CO2 28 23 - 33 meq/L    Calcium 8.8 8.5 - 10.5 mg/dL    AST 45 (H) 5 - 40 U/L    Alkaline Phosphatase 83 38 - 126 U/L    Total Protein 6.6 6.1 - 8.0 g/dL    Alb 4.1 3.5 - 5.1 g/dL    Total Bilirubin 0.2 (L) 0.3 - 1.2 mg/dL    ALT 67 (H) 11 - 66 U/L   Protime-INR    Collection Time: 08/06/20  3:48 AM   Result Value Ref Range    INR 1.11 0.85 - 1.13   APTT    Collection Time: 08/06/20  3:48 AM   Result Value Ref Range    aPTT 31.2 22.0 - 38.0 seconds   Fibrinogen    Collection Time: 08/06/20  3:48 AM   Result Value Ref Range    Fibrinogen 458 155 - 475 mg/100ml   D-Dimer, Quantitative    Collection Time: 08/06/20  3:48 AM   Result Value Ref Range    D-Dimer, Quant 229.00 0.00 - 500.0 ng/ml FEU   C-Reactive Protein    Collection Time: 08/06/20  3:48 AM   Result Value Ref Range    CRP 4.41 (H) 0.00 - 1.00 mg/dl   Procalcitonin    Collection Time: 08/06/20  3:48 AM   Result Value Ref Range    Procalcitonin 0.27 (H) 0.01 - 0.09 ng/mL   Anion Gap    Collection Time: 08/06/20  3:48 AM   Result Value Ref Range    Anion Gap 13.0 8.0 - 16.0 meq/L   Glomerular Filtration Rate, Estimated    Collection Time: 08/06/20  3:48 AM   Result Value Ref Range    Est, Glom Filt Rate >90 ml/min/1.73m2   POCT glucose    Collection Time: 08/06/20  9:14 AM   Result Value Ref Range    POC Glucose 169 (H) 70 - 108 mg/dl   POCT glucose    Collection Time: 08/06/20 12:11 PM   Result Value Ref Range    POC Glucose 230 (H) 70 - 108 mg/dl   POCT glucose    Collection Time: 08/06/20  4:35 PM   Result Value Ref Range    POC Glucose 310 (H) 70 - 108 mg/dl   POCT glucose    Collection Time: 08/06/20  8:50 PM   Result Value Ref Range    POC Glucose 300 (H) 70 - 108 mg/dl   CBC Auto Differential    Collection Time: 08/07/20  4:59 AM   Result Value Ref Range    WBC 11.6 (H) 4.8 - 10.8 thou/mm3    RBC 4.86 4.70 - 6.10 mill/mm3    Hemoglobin 14.6 14.0 - 18.0 gm/dl    Hematocrit 45.0 42.0 - 52.0 %    MCV 92.6 80.0 - 94.0 fL    MCH 30.0 26.0 - 33.0 pg    MCHC 32.4 32.2 - 35.5 gm/dl    RDW-CV 17.0 (H) 11.5 - 14.5 %    RDW-SD 58.0 (H) 35.0 - 45.0 fL 08/07/20  4:59 AM   Result Value Ref Range    Iron 43 (L) 65 - 195 ug/dL   Iron binding capacity    Collection Time: 08/07/20  4:59 AM   Result Value Ref Range    TIBC 244 171 - 450 ug/dL   IRON SATURATION    Collection Time: 08/07/20  4:59 AM   Result Value Ref Range    Iron Saturation 18 (L) 20 - 50 %   Anion Gap    Collection Time: 08/07/20  4:59 AM   Result Value Ref Range    Anion Gap 12.0 8.0 - 16.0 meq/L   Glomerular Filtration Rate, Estimated    Collection Time: 08/07/20  4:59 AM   Result Value Ref Range    Est, Glom Filt Rate 85 (A) ml/min/1.73m2   POCT glucose    Collection Time: 08/07/20  9:05 AM   Result Value Ref Range    POC Glucose 173 (H) 70 - 108 mg/dl   POCT glucose    Collection Time: 08/07/20 11:59 AM   Result Value Ref Range    POC Glucose 204 (H) 70 - 108 mg/dl        Microbiology:    Blood culture #1:   Lab Results   Component Value Date    BC No growth-preliminary  08/05/2020       Blood culture #2:No results found for: Boris Furry    Organism:  No results found for: LABGRAM    MRSA culture only:No results found for: [facility]    Urine culture: No results found for: LABURIN  No results found for: ORG     Respiratory culture: No results found for: CULTRESP    Aerobic and Anaerobic :  No results found for: LABAERO  No results found for: LABANAE    Urinalysis:      Lab Results   Component Value Date    NITRU NEGATIVE 08/05/2020    WBCUA 0-2 08/05/2020    BACTERIA NONE SEEN 08/05/2020    RBCUA 0-2 08/05/2020    BLOODU NEGATIVE 08/05/2020    Lakeshia [place] 994 NEGATIVE 08/05/2020       Radiology:-  Xr Chest Portable    Result Date: 8/5/2020  PROCEDURE: XR CHEST PORTABLE CLINICAL INFORMATION: sob. COMPARISON: Chest x-ray dated 5/20/2020 TECHNIQUE: AP Portable chest xray FINDINGS: Lines/tubes/devices: none Lungs/pleura: There are low lung volumes. No pneumonia, pulmonary edema, or obvious mass. No pleural effusion. No pneumothorax. Heart: Heart size is normal. Mediastinum/andres: No obvious mass or adenopathy. Skeleton: No significant bone or joint abnormality. No acute cardiopulmonary disease. **This report has been created using voice recognition software. It may contain minor errors which are inherent in voice recognition technology. ** Final report electronically signed by Dr. Harlan Nieves on 8/5/2020 3:40 AM       Follow-up scheduled after discharge :-    in the next few days with Jamie Trejo DO    Consultations during this hospital stay:-  [x] NONE [] Cardiology  [] Nephrology  [] Hemo onco   [] GI   [] ID  [] Endocrine  [] Pulm    [] Neuro    [] Psych   [] Urology  [] ENT   [] G SURGERY   []Ortho    []CV surg    [] Palliative  [] Hospice [] Pain management   []    []TCU   [] PT/OT  OTHERS:-    Disposition: SNF  Condition at Discharge: Stable    Time Spent:- 40 minutes    Electronically signed by Pipo Niño PA-C on 8/7/2020 at 1:49 PM  Discharging Hospitalist

## 2020-08-07 NOTE — PLAN OF CARE
Problem: Airway Clearance - Ineffective  Goal: Achieve or maintain patent airway  Outcome: Ongoing  Note: Airway patent. Patient is on room air. Will monitor. Problem: Gas Exchange - Impaired  Goal: Absence of hypoxia  Outcome: Ongoing  Note: No hypoxia noted. Patient remains on room air. Will monitor. Goal: Promote optimal lung function  Outcome: Ongoing  Note: No hypoxia noted. Patient remains on room air. Will monitor. Problem: Isolation Precautions - Risk of Spread of Infection  Goal: Prevent transmission of infection  Outcome: Ongoing  Note: Patient remains in droplet plus precautions. Door closed. Patient wearing mask when in room. Will monitor. Problem: Loneliness or Risk for Loneliness  Goal: Demonstrate positive use of time alone when socialization is not possible  Outcome: Ongoing  Note: Patient remains in contact with family. Denies any loneliness. Will monitor. Problem: Fatigue  Goal: Verbalize increase energy and improved vitality  Outcome: Ongoing  Note: Dyspnea with exertion. Fatigue after ambulation to bathroom. Will monitor. Problem: Breathing Pattern - Ineffective  Goal: Ability to achieve and maintain a regular respiratory rate  Note: Respiratory rate within normal limits. Denies SOB. Patient is on room air. Will monitor. Problem: Body Temperature -  Risk of, Imbalanced  Goal: Ability to maintain a body temperature within defined limits  Note: Patient afebrile. Will monitor. Goal: Will regain or maintain usual level of consciousness  Note: Patient alert and oriented x4. Delay responses at times. Will monitor. Goal: Complications related to the disease process, condition or treatment will be avoided or minimized  Note: No new complications related to disease or condition noted. Pt afebrile and on room air with oxygen saturation within normal limits. Will monitor.       Problem: Nutrition Deficits  Goal: Optimize nutrtional status  Note: Patient is tolerating

## 2020-08-07 NOTE — PROGRESS NOTES
Patient discharged to [facility] via [facility] ambulette. Blue transfer packet sent with patient/[facility] staff. Patient denied need for family contact of discharge.

## 2020-08-07 NOTE — DISCHARGE INSTR - COC
Continuity of Care Form    Patient Name: Aretha Bearden   :  1970  MRN:  [id number]    Admit date:  2020  Discharge date:  2020    Code Status Order: Full Code   Advance Directives:   [facility] Documentation     Date/Time Healthcare Directive Type of Healthcare Directive Copy in [address] Agent's Name Healthcare Agent's Phone Number    20 1330  No, patient does not have an advance directive for healthcare treatment -- -- -- -- --          Admitting Physician:  Yessenia Pugh MD  PCP: Lelo Argueta DO    Discharging Nurse: CHARISSA MONTES [facility] Unit/Room#: 4B-07/007-A  Discharging Unit Phone Number: [phone]      Emergency Contact:   Extended Emergency Contact Information  Primary Emergency Contact: Pedro Wilhelm   [address] Phone: [phone]  Relation: Other    Past Surgical History:  Past Surgical History:   Procedure Laterality Date    FOOT SURGERY Right     , R foot osteomyelitis    HIP SURGERY Left 2020    bilateral hip steroid injection, Left HIP FIRST performed by Chris [facility] at [facility]      as a baby       Immunization History: There is no immunization history on file for this patient. Active Problems:  Patient Active Problem List   Diagnosis Code    Chronic diastolic congestive heart failure (HCC) I50.32    Atrial fibrillation (HCC) I48.91    BPH (benign prostatic hyperplasia) N40.0    Carpal tunnel syndrome on right G56.01    Chronic gout M1A. 9XX0    DM2 (diabetes mellitus, type 2) ([facility] [address] E11.9    Heart failure with preserved ejection fraction (HCC) I50.30    History of alcohol abuse F10.11    History of osteomyelitis Z87.39    Hypertension, essential I10    Hypogonadism, male E29.1    Major depression F32.9    Morbid obesity (HCC) E66.01    DURAN (nonalcoholic steatohepatitis) K75.81    KIARA (obstructive sleep apnea) G47.33    Vitamin D deficiency E55.9    Normocytic anemia D64.9    Physical deconditioning R53.81    Mood disorder (HCC) F39    Hallucinations R44.3    GERD (gastroesophageal reflux disease) K21.9    Wound of buttock S31.809A    Chest wall pain with tenderness R07.89    Primary osteoarthritis of left hip M16.12    COVID-19 U07.1       Isolation/Infection:   Isolation          Droplet Plus        Patient Infection Status     Infection Onset Added Last Indicated Last Indicated By Review Planned Expiration Resolved Resolved By    COVID-19 08/05/20 08/05/20 08/05/20 COVID-19  09/30/20      Resolved    COVID-19 Rule Out 08/05/20 08/05/20 08/05/20 COVID-19 (Ordered)   08/05/20 Rule-Out Test Resulted    COVID-19 Rule Out 06/23/20 06/23/20 06/23/20 COVID-19 (Ordered)   06/24/20 Rule-Out Test Resulted          Nurse Assessment:  Last Vital Signs: /67   Pulse 58   Temp 97.1 °F (36.2 °C) (Oral)   Resp 18   Ht 5' 8\" (1.727 m)   Wt 299 lb 13.2 oz (136 kg)   SpO2 92%   BMI 45.59 kg/m²     Last documented pain score (0-10 scale): Pain Level: 0  Last Weight:   Wt Readings from Last 1 Encounters:   08/05/20 299 lb 13.2 oz (136 kg)     Mental Status:  oriented, alert and thought processes intact    IV Access:  - None    Nursing Mobility/ADLs:  Walking   Assisted  Transfer  Assisted  Bathing  Assisted  Dressing  Assisted  Toileting  Assisted  Feeding  Assisted  Med Admin  Assisted  Med Delivery   whole    Wound Care Documentation and Therapy:        Elimination:  Continence:   · Bowel: Yes  · Bladder: Yes  Urinary Catheter: None   Colostomy/Ileostomy/Ileal Conduit: No       Date of Last BM: 08/07/2020    Intake/Output Summary (Last 24 hours) at 8/7/2020 1546  Last data filed at 8/7/2020 1439  Gross per 24 hour   Intake 1837 ml   Output 0 ml   Net 1837 ml     I/O last 3 completed shifts: In: 1837 [P.O.:1827;  I.V.:10]  Out: 0     Safety Concerns:     None and At Risk for Falls    Impairments/Disabilities: None    Nutrition Therapy:  Current Nutrition Therapy:   - Oral Diet:  Carb Control 4 carbs/meal (1800kcals/day)    Routes of Feeding: Oral  Liquids: Thin Liquids  Daily Fluid Restriction: no  Last Modified Barium Swallow with Video (Video Swallowing Test): not done    Treatments at the Time of Hospital Discharge:   Respiratory Treatments: None  Oxygen Therapy:  is not on home oxygen therapy. Ventilator:    - No ventilator support    Rehab Therapies: ***  Weight Bearing Status/Restrictions: No weight bearing restirctions  Other Medical Equipment (for information only, NOT a DME order):  wheelchair  Other Treatments: ***    Patient's personal belongings (please select all that are sent with patient):  None    RN SIGNATURE:  Electronically signed by Sebas Carcamo RN on 8/7/20 at 4:03 PM EDT    CASE MANAGEMENT/SOCIAL WORK SECTION    Inpatient Status Date: ***    Readmission Risk Assessment Score:  Readmission Risk              Risk of Unplanned Readmission:        26           Discharging to Facility/ Agency   · Name:   · Address:  · Phone:  · Fax:    Dialysis Facility (if applicable)   · Name:  · Address:  · Dialysis Schedule:  · Phone:  · Fax:    / signature: {Esignature:[id number]}    PHYSICIAN SECTION    Prognosis: Good    Condition at Discharge: Stable    Rehab Potential (if transferring to Rehab): Good    Recommended Labs or Other Treatments After Discharge:     Physician Certification: I certify the above information and transfer of Breanna Cabrales  is necessary for the continuing treatment of the diagnosis listed and that he requires [facility] for greater 30 days.      Update Admission H&P: No change in H&P    PHYSICIAN SIGNATURE:  Electronically signed by Rosenda Wong DO on 8/7/20 at 4:57 PM EDT

## 2020-08-08 NOTE — ED PROVIDER NOTES
[place] ENCOUNTER          Pt Name: Silvino Evans  MRN: [id number]  [place] 1970  Date of evaluation: 8/5/2020  Emergency Physician: SANDRA Lundberg CNP    CHIEF COMPLAINT       Chief Complaint   Patient presents with    Shortness of Breath    Fever     History obtained from the patient. HISTORY OF PRESENT ILLNESS    The history is provided by the patient and medical records. Silvino Evans is a 44 y.o. male who presents to the emergency department for evaluation of shortness of breath and fever. He is from a nursing home with known COVID patient. He states that he is supposed to be on CPAP at night but doesn't like to wear it. States that SOB is getting worse. Fever has been on and off throughout the entire day. The patient has no other acute complaints at this time. REVIEW OF SYSTEMS   Review of Systems   Constitutional: Positive for appetite change, chills, fatigue and fever. HENT: Negative for congestion, ear discharge, ear pain, postnasal drip and rhinorrhea. Respiratory: Positive for shortness of breath. Negative for cough and chest tightness. Cardiovascular: Positive for leg swelling. Negative for chest pain and palpitations. Gastrointestinal: Negative for abdominal pain, nausea and vomiting. Genitourinary: Negative for difficulty urinating, dysuria, enuresis, flank pain and hematuria. Musculoskeletal: Negative for back pain and joint swelling. Skin: Negative for rash. Neurological: Negative for dizziness, light-headedness, numbness and headaches. Psychiatric/Behavioral: Negative for agitation, behavioral problems and confusion.          PAST MEDICAL AND SURGICAL HISTORY     Past Medical History:   Diagnosis Date    Arthritis     RHEUMATOID    Atrial fibrillation (HCC)     BPH (benign prostatic hyperplasia)     CAD (coronary artery disease)     Carpal tunnel syndrome on ), Disp: 90 tablet, Rfl: 0    esomeprazole Magnesium (NEXIUM) 20 MG PACK, Take 20 mg by mouth daily, Disp: , Rfl:     vitamin C (ASCORBIC ACID) 500 MG tablet, Take 500 mg by mouth 2 times daily , Disp: , Rfl:     hydrALAZINE (APRESOLINE) 50 MG tablet, Take 50 mg by mouth 2 times daily , Disp: , Rfl:     polyethylene glycol (GLYCOLAX) powder, Take 17 g by mouth every other day , Disp: , Rfl:     Multiple Vitamins-Minerals (THERAPEUTIC MULTIVITAMIN-MINERALS) tablet, Take 1 tablet by mouth daily, Disp: , Rfl:     Diaper Rash Products (PINXAV) OINT, Apply topically, Disp: , Rfl:     Probiotic Product (SOBIA-BID PROBIOTIC PO), Take 1 tablet by mouth daily , Disp: , Rfl:     ondansetron (ZOFRAN) 4 MG tablet, Take 4 mg by mouth every 8 hours as needed for Nausea or Vomiting, Disp: , Rfl:     diltiazem (CARDIZEM) 60 MG tablet, Take 60 mg by mouth 2 times daily, Disp: , Rfl:     apixaban (ELIQUIS) 5 MG TABS tablet, Take by mouth 2 times daily, Disp: , Rfl:     tamsulosin (FLOMAX) 0.4 MG capsule, Take 0.4 mg by mouth nightly , Disp: , Rfl:     folic acid (FOLVITE) 1 MG tablet, Take 1 mg by mouth daily, Disp: , Rfl:     furosemide (LASIX) 40 MG tablet, Take 40 mg by mouth daily, Disp: , Rfl:     gabapentin (NEURONTIN) 400 MG capsule, Take 800 mg by mouth 2 times daily. , Disp: , Rfl:     insulin lispro (HUMALOG) 100 UNIT/ML injection vial, Inject into the skin 3 times daily (before meals) Per scale: 151-200=1 unit, 201-250=2 units, 251-300-3 units, 301-350=4 units, 351-400=5 units. , Disp: , Rfl:     sitaGLIPtan-metformin (JANUMET)  MG per tablet, Take 1 tablet by mouth 2 times daily (with meals), Disp: , Rfl:     insulin glargine (LANTUS) 100 UNIT/ML injection vial, Inject 10 Units into the skin nightly , Disp: , Rfl:     senna (SENOKOT) 8.6 MG tablet, Take 1 tablet by mouth 2 times daily, Disp: , Rfl:     ARIPiprazole (ABILIFY PO), Take 15 mg by mouth daily , Disp: , Rfl:     amlodipine (NORVASC) 10 101.7 °F (38.7 °C) Axillary 100 24 100 % 5' 8\" (1.727 m) (!) 306 lb (138.8 kg)     Initial vital signs and nursing assessment reviewed and normal. Pulsoximetry is normal per my interpretation. Additional Vital Signs:  Vitals:    08/07/20 1440   BP: 123/67   Pulse: 58   Resp: 18   Temp: 97.1 °F (36.2 °C)   SpO2: 92%       Physical Exam  Vitals signs and nursing note reviewed. Constitutional:       General: He is not in acute distress. Appearance: He is well-developed. He is obese. He is not diaphoretic. Comments: Pt falls asleep easily, sonorous. HENT:      Head: Normocephalic and atraumatic. Eyes:      General:         Right eye: No discharge. Left eye: No discharge. Conjunctiva/sclera: Conjunctivae normal.   Neck:      Musculoskeletal: Normal range of motion. Trachea: No tracheal deviation. Cardiovascular:      Rate and Rhythm: Normal rate and regular rhythm. Heart sounds: Normal heart sounds. No murmur. No gallop. Comments: Normal capillary refill  Pulmonary:      Effort: No respiratory distress. Breath sounds: Wheezing and rales present. Abdominal:      General: Bowel sounds are normal. There is distension. Palpations: Abdomen is soft. Musculoskeletal: Normal range of motion. General: No tenderness or deformity. Skin:     General: Skin is warm and dry. Capillary Refill: Capillary refill takes 2 to 3 seconds. Coloration: Skin is not pale. Findings: No erythema or rash. Neurological:      General: No focal deficit present. Mental Status: He is alert, oriented to person, place, and time and easily aroused. Cranial Nerves: No cranial nerve deficit. Psychiatric:         Mood and Affect: Mood normal.         Behavior: Behavior normal.             MEDICAL DECISION MAKING   Differential Diagnosis:  COVID, PNA, KIARA with CO2 Narcosis    Initial Assessment: Ill appearing, falls asleep during exam with sonorous respirs. Concern for CO2 narcosis. .  Plan: Labs, ABG, BIPAP, CXR, consider CTA. Jimmie Michael ED RESULTS   Laboratory results:  Labs Reviewed   CBC WITH AUTO DIFFERENTIAL - Abnormal; Notable for the following components:       Result Value    WBC 12.9 (*)     MCV 95.3 (*)     MCHC 31.3 (*)     RDW-CV 18.0 (*)     RDW-SD 62.4 (*)     Segs Absolute 11.2 (*)     Lymphocytes Absolute 0.5 (*)     All other components within normal limits   BLOOD GAS, ARTERIAL - Abnormal; Notable for the following components:    pH, Blood Gas 7.28 (*)     PCO2 73 (*)     HCO3 35 (*)     Base Excess (Calculated) 4.8 (*)     All other components within normal limits   COVID-19 - Abnormal; Notable for the following components:    SARS-CoV-2, NAAT DETECTED (*)     All other components within normal limits   BASIC METABOLIC PANEL - Abnormal; Notable for the following components:    Potassium 5.8 (*)     Glucose 140 (*)     All other components within normal limits   HEPATIC FUNCTION PANEL - Abnormal; Notable for the following components:     Total Bilirubin 0.2 (*)      (*)     ALT 87 (*)     All other components within normal limits   C-REACTIVE PROTEIN - Abnormal; Notable for the following components:    CRP 3.67 (*)     All other components within normal limits   LACTATE DEHYDROGENASE - Abnormal; Notable for the following components:     (*)     All other components within normal limits   TROPONIN - Abnormal; Notable for the following components:    Troponin T 0.018 (*)     All other components within normal limits   PROCALCITONIN - Abnormal; Notable for the following components:    Procalcitonin 0.46 (*)     All other components within normal limits   BLOOD GAS, ARTERIAL - Abnormal; Notable for the following components:    pH, Blood Gas 7.30 (*)     PCO2 67 (*)     PO2 111 (*)     HCO3 33 (*)     Base Excess (Calculated) 4.0 (*)     All other components within normal limits   GLOMERULAR FILTRATION RATE, ESTIMATED - Abnormal; Notable for components:    POC Glucose 230 (*)     All other components within normal limits   POCT GLUCOSE - Abnormal; Notable for the following components:    POC Glucose 310 (*)     All other components within normal limits   POCT GLUCOSE - Abnormal; Notable for the following components:    POC Glucose 300 (*)     All other components within normal limits   POCT GLUCOSE - Abnormal; Notable for the following components:    POC Glucose 173 (*)     All other components within normal limits   POCT GLUCOSE - Abnormal; Notable for the following components:    POC Glucose 204 (*)     All other components within normal limits   CULTURE, BLOOD 1    Narrative:     Source: blood-Adult-optimal 5.5-5.7oz/set volume       Site: Peripheral Vein                            Current Antibiotics: not stated   CULTURE, BLOOD 2    Narrative:     Source: blood-Adult-suboptimal <5.5oz./set volume       Site: (single bottle)Peripheral;                            Current Antibiotics: not stated   STREP PNEUMONIAE ANTIGEN   LEGIONELLA ANTIGEN, URINE   LACTIC ACID, PLASMA   SPECIMEN REJECTION   LIPASE   ANION GAP   OSMOLALITY   APTT   FIBRINOGEN   FERRITIN   D-DIMER, QUANTITATIVE   TROPONIN   TROPONIN   TROPONIN   PROTIME-INR   APTT   FIBRINOGEN   D-DIMER, QUANTITATIVE   ANION GAP   ANION GAP   GLOMERULAR FILTRATION RATE, ESTIMATED   PROTIME-INR   APTT   FIBRINOGEN   D-DIMER, QUANTITATIVE   VITAMIN B12 & FOLATE   IRON BINDING CAPACITY   ANION GAP   POCT GLUCOSE   TYPE AND SCREEN       Radiologic studies results:  XR CHEST PORTABLE   Final Result      No acute cardiopulmonary disease. **This report has been created using voice recognition software. It may contain minor errors which are inherent in voice recognition technology. **      Final report electronically signed by Dr. Kevin Spicer on 8/5/2020 3:40 AM          ED Medications administered this visit:   Medications   0.9 % sodium chloride bolus (0 mLs Intravenous Stopped 8/5/20 0449)

## 2020-08-10 ENCOUNTER — CARE COORDINATION (OUTPATIENT)
Dept: CASE MANAGEMENT | Age: 50
End: 2020-08-10

## 2020-08-10 LAB
BLOOD CULTURE, ROUTINE: NORMAL
BLOOD CULTURE, ROUTINE: NORMAL

## 2020-08-21 ENCOUNTER — OUTSIDE SERVICES (OUTPATIENT)
Dept: FAMILY MEDICINE CLINIC | Age: 50
End: 2020-08-21
Payer: MEDICARE

## 2020-08-21 VITALS
HEIGHT: 68 IN | WEIGHT: 306.4 LBS | SYSTOLIC BLOOD PRESSURE: 121 MMHG | RESPIRATION RATE: 24 BRPM | TEMPERATURE: 98 F | DIASTOLIC BLOOD PRESSURE: 76 MMHG | HEART RATE: 60 BPM | BODY MASS INDEX: 46.44 KG/M2

## 2020-08-21 PROCEDURE — 99310 SBSQ NF CARE HIGH MDM 45: CPT | Performed by: FAMILY MEDICINE

## 2020-08-21 NOTE — PROGRESS NOTES
of gross bleeding. Continue to monitor and transfuse for Hgb < 7 or hemodynamic instability. 9. Anxiety/Depression: Continue BuSpar/SSRI/Abilify.  Monitor for signs of serotonin syndrome. 10. A-fib on OAC: rate controlled with Cardizem, continue. Continue Eliquis. Currently NSR on tele. 11. KIARA not on CPAP: pt has CPAP but reports he does not wear it  12. Disposition: pt will require precert to return to [address] [place] [address] course:-  'Patient is a 47-year-old male with a past medical history of obesity, KIARA, anxiety depression, hypertension, hyperlipidemia, rheumatoid arthritis, Type 2 diabetes, atrial fibrillation.  Patient states for the last couple of days he has developed a fever and shortness of breath.  He states his biggest complaint is that he feels hot. [facility] states that this is been going on for approximately 2 to 3 days.  He lives at [facility] and was notified that there have been COVID positive individuals at the building.  Patient was 89% on room air and was placed on 2 L nasal cannula on arrival. [facility] has been in the ED all morning and has progressively increase his oxygen demands to 6 L nasal cannula.  He was briefly on BiPAP for respiratory acidosis in which his PCO2 was in the 70s.  He improved to a PCO2 in the 60s with a pH improvement from 7.28-7. Ling states that he does not wear his CPAP at night.  He was started on Decadron in the ED as well as empiric antibiotics. \"     8/6-> pt states that he is feeling much better. Denies feeling SOB, has been weaned down to RA. No CP. Admits to fever/ feeling hot when he came in, denies any fever/chills overnight or at this time. Reports he has been coughing some, but that it is a dry cough and admits to \"scratchy\" feeling in his chest.       Subjective (past 24 hours):   8/7-> pt is doing well today, states he wants to return home. Pt denies fever/chills.  Reports that he has continued dry, non-productive cough and that it is improving. No CP/palpitations. No abd pain, n/v/d/c.         Patient was admitted zero 8/0 5 with complaint of fever and shortness of breath. Patient lives at [facility] and had been in contact with COVID positive individuals. Patient was found to have 89% on room air and was placed on 2 L nasal cannula and eventually was requiring 6 L nasal cannula due to progressive worsening. Patient was briefly placed on BiPAP given respiratory acidosis noted on ABG with PCO2 in the 70s. Patient was found to have improved with BiPAP therapy on subsequent ABGs. He was admitted and started on rem-severe and Decadron as well as Zithromax empirically. Patient was weaned off supplemental O2 without difficulty and has been on room air. Patient states that he feels much better and denies shortness of breath, he admits to a dry nonproductive cough. He has been afebrile since admission. Patient will be discharged with 2 days of Zithromax and 2 days of Decadron in order to complete total of 5-day course. Patient is to resume his daily prednisone after finishing Decadron course. Patient was also found to have a mild KRISTY at admission which resolved with fluids. VSS and suitable for discharge home and appropriate social distancing and quarantine. \"    Pt, however, decompensated and was subsequently admitted to [facility]    Patient admitted to [facility] from 8/12 to 8/19 for Covid. D/c summary:   \"Hospital Course    - Discharge Diagnoses  (1) Acute hypoxemic respiratory failure  Status: Acute  Home Going Treatment Plan:  Resolved      (2) COVID-19  Status: Acute  Home Going Treatment Plan:  Continue on Decadron  Isolation precautions  Zinc, multivitamin      (3) Atrial fibrillation  Status: Chronic  Qualifiers:     Atrial fibrillation type: paroxysmal   Qualified Code(s): I48.0 - Paroxysmal atrial  fibrillation  Home Going Treatment Plan:  On Eliquis      (4) HTN (hypertension)  Status: Chronic  Qualifiers:     Hypertension type: essential wk was having inc pain and joint swelling  ESR/CRP quite high  Started on pred taper last wk, feeling much better. Joint pain sig improved. Tolerating well  Percocet working better now  No fevers  No HA's     UPDATE PRIOR VISIT:   Joint pain doing much better on pred taper  Esr/crp improved  On prn percocet  No fevers or headahces  Feeling better  Eating well  Renal fxn better  Good progress with PT      UPDATE PRIOR VISIT:   Seen by rheum  Felt pt doesn't have RA, does have gout  RA meds stopped  Allopurinol started  Being weaned off pred by rheum     Saw ortho the other day for OA hips. They discussed LISSETH vs injections. He's going to try injections 1st with pain management at [facility]     On percocet, is helping. Overall joint pain doing better  Mild anemia stable, no bleeding, melena or hematochezia  Continues to work with PT     UPDATE LAST VISIT:   Gout pain better  On allopurinol  Rheum is weaning prednisone yet     Saw ortho the other day for OA hips. They discussed LISSETH vs injections. He's going to try injections 1st with pain management at [facility]. Apt upcoming.      On percocet, is helping  Has apt to see pain management for back as well  Overall joint pain doing better      UPDATE TODAY:   Gout con't do well  On allopurinol  Rheum weaning prednisone     Saw ortho the other day for OA hips. They discussed LISSETH vs injections. He's going to try injections 1st with pain management at [facility]. Treatment on hold d/t covid      On percocet, is helping  Has apt to see pain management for back as well. On hold as above  Overall joint pain doing better       -DM2 on Janumet as well as basaglar and SSI with humalog. Since admission sugars have been high but is on decadrone. When was on lower dose pred, sugars were appropriate. A1c 7.1 in early June 2020. -HTN: on norvasc, dilt 60mg tid, and hydralazine. Was on chlorthalidone prior, but this was changed to lasix. BP have been <140/90.  No CP/SOB     -AFib/HF with preserved EF: not clear if parox or perm. On CCB and eliquis as well as lasix. He is current with cardio at [facility] now. Denies CP, SOB or palpitations.      -Depression/mood disorder: follows with Anita. On celexa, buspar and abilify. Has occ hallucinations. None at present or in a while. Denies SI/HI. Feels moods are better.      -GERD: on nexium. Helps with his GERD. Denies sxs or dysphagia     -BPH: on flomax, works well for him, denies sig LUTS.    -Vit d def: on supplementation      Allergies and Medications were reviewed through the [facility] EMR. All medications reviewed and reconciled, including OTC and herbal medications.        Patient Active Problem List    Diagnosis Date Noted    COVID-19 08/05/2020    Primary osteoarthritis of left hip     Chest wall pain with tenderness 05/22/2020    Normocytic anemia 02/09/2020    Physical deconditioning 02/09/2020    Hallucinations 02/09/2020    Wound of buttock 02/09/2020    Atrial fibrillation (HCC)     BPH (benign prostatic hyperplasia)     Carpal tunnel syndrome on right      rt hand      Chronic gout     DM2 (diabetes mellitus, type 2) ([address]     Heart failure with preserved ejection fraction (HCC)     History of alcohol abuse     History of osteomyelitis      Hx of R foot wound, osteomyelitis July 2018 requiring surgery and IV Vanco      Hypertension, essential     Hypogonadism, male     Major depression     Morbid obesity ([address]     DURAN (nonalcoholic steatohepatitis)     KIARA (obstructive sleep apnea)     Vitamin D deficiency     Mood disorder (HCC)     GERD (gastroesophageal reflux disease)     Chronic diastolic congestive heart failure ([address] 09/30/2019       Past Medical History:   Diagnosis Date    Arthritis     RHEUMATOID    Atrial fibrillation (HCC)     BPH (benign prostatic hyperplasia)     CAD (coronary artery disease)     Carpal tunnel syndrome on right     rt hand    Chronic gout     DM2 (diabetes mellitus, type 2) (HCC)     GERD (gastroesophageal reflux disease)     Heart failure with preserved ejection fraction (HCC)     History of alcohol abuse     History of osteomyelitis     Hx of R foot wound, osteomyelitis July 2018 requiring surgery and IV Vanco    Hyperlipidemia     Hypertension, essential     Hypogonadism, male     Major depression     Mood disorder ([address]     Morbid obesity ([address]     Muscle weakness     DURAN (nonalcoholic steatohepatitis)     Obstructive sleep apnea treated with continuous positive airway pressure (CPAP)     KIARA (obstructive sleep apnea)     Vitamin D deficiency        Past Surgical History:   Procedure Laterality Date    FOOT SURGERY Right     2018, R foot osteomyelitis    HIP SURGERY Left 6/29/2020    bilateral hip steroid injection, Left HIP FIRST performed by Navya Monteiro MD at [facility]      as a baby       Allergies   Allergen Reactions    Pcn [Penicillins]        Social History     Tobacco Use    Smoking status: Never Smoker    Smokeless tobacco: Never Used   Substance Use Topics    Alcohol use: Not Currently     Comment: hx of abuse        Family History   Problem Relation Age of Onset    Heart Disease Mother         MI    Cancer Paternal Aunt         lung         I have reviewed the patient's past medical history, past surgical history, allergies, medications, social and family history and I have made updates where appropriate.       Review of Systems  Positive responses are highlighted in bold    Constitutional:  Fever, Chills, Night Sweats, Fatigue, Unexpected changes in weight  Eyes:  Eye discharge, Eye pain, Eye redness, Visual disturbances   HENT:  Ear pain, Tinnitus, Nosebleeds, Trouble swallowing, Hearing loss, Sore throat  Cardiovascular:  Chest Pain, Palpitations, Orthopnea, Paroxysmal Nocturnal Dyspnea  Respiratory:  Cough, Wheezing, Shortness of breath, Chest tightness, Apnea  Gastrointestinal:  Nausea, Vomiting, Diarrhea, Constipation, Heartburn, Blood in stool  Genitourinary:  Difficulty or painful urination, Flank pain, Change in frequency, Urgency  Skin:  Color change, Rash, Itching, Wound  Psychiatric:  Hallucinations, Anxiety, Depression, Suicidal ideation  Hematological:  Enlarged glands, Easy bleeding, Easily bruising  Musculoskeletal:  Joint pain, Back pain, Gait problems, Joint swelling, Myalgias  Neurological:  Dizziness, Headaches, Presyncope, Numbness, Seizures, Tremors  Allergy:  Environmental allergies, Food allergies  Endocrine:  Heat Intolerance, Cold Intolerance, Polydipsia, Polyphagia, Polyuria      PHYSICAL EXAM:  Vitals:    08/21/20 1315   BP: 121/76   Pulse: 60   Resp: 24   Temp: 98 °F (36.7 °C)   Weight: (!) 306 lb 6.4 oz (139 kg)   Height: 5' 8\" (1.727 m)     Body mass index is 46.59 kg/m². Pain Score: 4(Chronic back/joints pain)    VS Reviewed  General Appearance: well developed and well- nourished, in no acute distress  Head: normocephalic and atraumatic  Eyes: pupils equal, round, and reactive to light, conjunctivae and eye lids without erythema  ENT: external ear and ear canal normal bilaterally, nose without deformity, nasal mucosa and turbinates normal without polyps, oropharynx normal, dentition is normal for age, no lip or gum lesions noted  Neck: supple and non-tender without mass, no thyromegaly or thyroid nodules, no cervical lymphadenopathy  Pulmonary/Chest: clear to auscultation bilaterally- no wheezes, rales or rhonchi, normal air movement, no respiratory distress or retractions  Cardiovascular: normal rate, regular rhythm, normal S1 and S2, no murmurs, rubs, clicks, or gallops, distal pulses intact  Abdomen: soft, non-tender, non-distended, bowel sounds physiologic,  no rebound or guarding, no masses or hernias noted. Liver and spleen without enlargement.    Extremities: no cyanosis, clubbing or edema of the lower extremities  Musculoskeletal: No joint swelling or gross deformity   Neuro:  Alert, 2+ patellar reflexes b/l,  normal speech, no focal findings or movement disorder noted  Psych:  Normal affect without evidence of depression or anxiety, insight and judgement are appropriate, memory appears intact  Skin: warm and dry, no rash or erythema. Lymph:  No cervical, auricular or supraclavicular lymph nodes palpated      LABS/IMAGING    CBC 19 AUG 2020  CBC Without Differential      WBC                              16.6                 H     4.4-10.5 th/cmm      RBC                              5.06                       4. 50-6.00 mil/cmm      Hemoglobin                       14.9                       13.5-16.5 gm/dL      Hematocrit                       44.1                       40.0-49.0 %      MCV                              87.1                       80-97 CU AISSATOU      MCH                              29.4                       27.5-33.0 PG      MCHC                             33.7                       33.0-36.0 gm/dL      RDW                              17.9                 H     12.0-16.0 %      Platelet Count                   349                        150-400 th/cmm      BMP ETC 19 AUG 9922  Basic Metabolic Panel,Fasting      Sodium                           139                        135-145 mEq/L      Potassium                        4.3                        3.6-5.0 mEq/L      Chloride                         101                        101-111 mEq/L      Carbon Dioxide                    30                        21-32 mEq/L      Anion Gap                         8                         4-12      Glucose, Fasting                 159                  H      mg/dL      BUN                               32                  H     7-20 mg/dL      Creatinine                       0.93                       0. 60-1.30 mg/dL      GFR Calculation                  > 60                           AGE(years)       AVERAGE GFR                            50-59           93 ml/min/1.73 square meters Note:This result is normalized to 1.73 square meter                                body surface area. Height and weight are not                                factored. Chronic Kidney Disease stages by NKDF                           Stage       eGFR                           --------------------------                            I           >90                            II          60-89                            III         30-59                            IV          15-29                            V           <15 or dialysis      Calcium                          9. 10                       8. 8-10.5 mg/dL    C-Reactive Protein                 1.2                  H     <1.0 mg/dL    Ferritin                           214                         ng/mL        Exam Date: 08/18/20  Accession #:  [id number]  Exam:  MAIN   XR Chest 1 View Portable  Result: See Report    STUDY:   X-RAY CHEST     REASON FOR EXAM:   Male, 44years old. SOB     TECHNIQUE:   AP     COMPARISON:   08/12/2020   ___________________________________     FINDINGS:   EKG leads project over the chest.     Multilobar airspace consolidation involving the central and basilar lungs   predominantly worse since the prior study. There is no demonstrated   pleural abnormality. Normal size heart. Normal mediastinum and andres. Normal visualized   pulmonary arteries. Normal visualized aortic arch and descending thoracic   aorta. No acute bony process. There is no demonstrated abnormality of the visualized soft tissue   structures of the upper abdomen.   ___________________________________     IMPRESSION:   Unfavorable change. Worsening multilobar airspace disease suggesting   worsening pneumonia (including typical and atypical causes) or pulmonary   edema.      Electronically Signed:   Reyes Stoddard MD   2020/08/18 at 8:36 EDT   Tel [phone], Service support  [phone], Fax [phone]      ASSESSMENT & PLAN  1. Pneumonia due to COVID-19 virus    Good improvement  Finish decadron  Wean O2 as able  Monitor closely  Labs monday    2. Acute respiratory failure with hypoxia ([facility]    As per # 1    3. KRISTY (acute kidney injury) ([facility]    As per # 1    4. Idiopathic chronic gout of multiple sites without tophus    Doing well  con't allopurinol  Labs improving yet  Resume pred 7.5mg daily once done with decadron tomorrow  F/u rheum as planned when able. 5. Primary osteoarthritis of both hips    con't prn percocet for now  Consult placed for pain management at [facility], has apt for intervention  F/u ortho. 6. Rheumatoid arthritis of multiple sites with negative rheumatoid factor ([facility]    Felt to not be a dx for him per rheum  Is off DMARDS    7. Normocytic anemia    Resolved at present   monitor    8. Physical deconditioning    Weaker since back from hospital  Will start PT/OT    9. Type 2 diabetes mellitus with diabetic polyneuropathy, with long-term current use of insulin ([facility])    Sugars high, anticipate will improve as decadron comes off  Will fax sugars next wk  For now, con't basaglar, scheduled and SSI with humalog  con't janument  On lipitor now as well    10. Essential hypertension    At goal  con't norvasc, dilt, and hydralazine    11. Atrial fibrillation, unspecified type ([facility]    Stable  con't rate control with dilt and eliquis  F/u cardio    12. Chronic heart failure with preserved ejection fraction (HCC)    Daily wts  Lasix  Stable   Cardio f/u    13. Recurrent major depressive disorder, in full remission ([facility]    Stable  Doing well  con't abilify and celexa  F/u psych [facility]     Antipsychotic/Antianxiety/Hypnotic/Psychotropic/Sedation/Antidepressant medications are continued at this time because discontinuation may result in adverse effects or return of concerning behaviors/symptoms. 14. Mood disorder ([facility]    As above    15. Hallucinations    As above    16. Gastroesophageal reflux disease, esophagitis presence not specified    con't nexium    17. Benign prostatic hyperplasia without lower urinary tract symptoms    Doing well  con't flomax    18. Vitamin D deficiency    Doing well  con't vit D supplement      Disposition: FULL CODE. Plan as above.        Electronically signed by Alberto Ward DO on 8/21/2020 at 3:58 PM

## 2020-08-25 ENCOUNTER — TELEPHONE (OUTPATIENT)
Dept: CARDIOLOGY CLINIC | Age: 50
End: 2020-08-25

## 2020-08-25 NOTE — LETTER
[address] [facility]  [address] [address]  Phone: [phone]  Fax: [phone]      August 25, 2020     Silvia Ambrocio  [address] [facility]      Dear Thomas Reasoner:    I am writing you because I have been informed of your missed appointment(s). We care about you and the management of your healthcare and want to make sure that you follow up as recommended. We're sorry you were unable to keep your appointment and hope that you are doing well. We would like to continue treating your healthcare needs. Please call the office to let us know your plans for treatment and to reschedule your appointment.      Sincerely,        Diamond Banerjee MD

## 2020-09-06 ENCOUNTER — HOSPITAL ENCOUNTER (INPATIENT)
Age: 50
LOS: 10 days | Discharge: HOME HEALTH | DRG: 177 | End: 2020-09-16
Attending: FAMILY MEDICINE | Admitting: INTERNAL MEDICINE
Payer: MEDICARE

## 2020-09-06 ENCOUNTER — APPOINTMENT (OUTPATIENT)
Dept: CT IMAGING | Age: 50
DRG: 177 | End: 2020-09-06
Payer: MEDICARE

## 2020-09-06 ENCOUNTER — APPOINTMENT (OUTPATIENT)
Dept: GENERAL RADIOLOGY | Age: 50
DRG: 177 | End: 2020-09-06
Payer: MEDICARE

## 2020-09-06 PROBLEM — U07.1 COVID-19 VIRUS INFECTION: Status: ACTIVE | Noted: 2020-09-06

## 2020-09-06 LAB
ALBUMIN SERPL-MCNC: 3.1 G/DL (ref 3.5–5.1)
ALP BLD-CCNC: 86 U/L (ref 38–126)
ALT SERPL-CCNC: 29 U/L (ref 11–66)
ANION GAP SERPL CALCULATED.3IONS-SCNC: 13 MEQ/L (ref 8–16)
AST SERPL-CCNC: 16 U/L (ref 5–40)
AVERAGE GLUCOSE: 237 MG/DL (ref 70–126)
BASOPHILS # BLD: 0.3 %
BASOPHILS ABSOLUTE: 0 THOU/MM3 (ref 0–0.1)
BILIRUB SERPL-MCNC: 0.5 MG/DL (ref 0.3–1.2)
BILIRUBIN DIRECT: < 0.2 MG/DL (ref 0–0.3)
BUN BLDV-MCNC: 22 MG/DL (ref 7–22)
C-REACTIVE PROTEIN: 9 MG/DL (ref 0–1)
CALCIUM SERPL-MCNC: 9.1 MG/DL (ref 8.5–10.5)
CHLORIDE BLD-SCNC: 101 MEQ/L (ref 98–111)
CO2: 29 MEQ/L (ref 23–33)
CREAT SERPL-MCNC: 1 MG/DL (ref 0.4–1.2)
EKG ATRIAL RATE: 101 BPM
EKG P AXIS: 49 DEGREES
EKG P-R INTERVAL: 142 MS
EKG Q-T INTERVAL: 358 MS
EKG QRS DURATION: 84 MS
EKG QTC CALCULATION (BAZETT): 464 MS
EKG R AXIS: -13 DEGREES
EKG T AXIS: 61 DEGREES
EKG VENTRICULAR RATE: 101 BPM
EOSINOPHIL # BLD: 1 %
EOSINOPHILS ABSOLUTE: 0.1 THOU/MM3 (ref 0–0.4)
ERYTHROCYTE [DISTWIDTH] IN BLOOD BY AUTOMATED COUNT: 17.1 % (ref 11.5–14.5)
ERYTHROCYTE [DISTWIDTH] IN BLOOD BY AUTOMATED COUNT: 58.5 FL (ref 35–45)
GFR SERPL CREATININE-BSD FRML MDRD: > 90 ML/MIN/1.73M2
GLUCOSE BLD-MCNC: 252 MG/DL (ref 70–108)
GLUCOSE BLD-MCNC: 338 MG/DL (ref 70–108)
GLUCOSE BLD-MCNC: 362 MG/DL (ref 70–108)
HBA1C MFR BLD: 9.9 % (ref 4.4–6.4)
HCT VFR BLD CALC: 42.2 % (ref 42–52)
HEMOGLOBIN: 13.3 GM/DL (ref 14–18)
IMMATURE GRANS (ABS): 0.12 THOU/MM3 (ref 0–0.07)
IMMATURE GRANULOCYTES: 1 %
LACTIC ACID: 2 MMOL/L (ref 0.5–2.2)
LD: 281 U/L (ref 100–190)
LYMPHOCYTES # BLD: 8.1 %
LYMPHOCYTES ABSOLUTE: 0.9 THOU/MM3 (ref 1–4.8)
MCH RBC QN AUTO: 29.6 PG (ref 26–33)
MCHC RBC AUTO-ENTMCNC: 31.5 GM/DL (ref 32.2–35.5)
MCV RBC AUTO: 93.8 FL (ref 80–94)
MONOCYTES # BLD: 7.5 %
MONOCYTES ABSOLUTE: 0.9 THOU/MM3 (ref 0.4–1.3)
MRSA SCREEN RT-PCR: POSITIVE
NUCLEATED RED BLOOD CELLS: 0 /100 WBC
OSMOLALITY CALCULATION: 296.8 MOSMOL/KG (ref 275–300)
PLATELET # BLD: 138 THOU/MM3 (ref 130–400)
PMV BLD AUTO: 11.4 FL (ref 9.4–12.4)
POTASSIUM SERPL-SCNC: 3.9 MEQ/L (ref 3.5–5.2)
PRO-BNP: 51 PG/ML (ref 0–900)
PROCALCITONIN: 0.37 NG/ML (ref 0.01–0.09)
RBC # BLD: 4.5 MILL/MM3 (ref 4.7–6.1)
SARS-COV-2, NAAT: DETECTED
SEG NEUTROPHILS: 82.1 %
SEGMENTED NEUTROPHILS ABSOLUTE COUNT: 9.5 THOU/MM3 (ref 1.8–7.7)
SODIUM BLD-SCNC: 143 MEQ/L (ref 135–145)
TOTAL PROTEIN: 6.3 G/DL (ref 6.1–8)
TROPONIN T: < 0.01 NG/ML
VANCOMYCIN RESISTANT ENTEROCOCCUS: POSITIVE
WBC # BLD: 11.6 THOU/MM3 (ref 4.8–10.8)

## 2020-09-06 PROCEDURE — 51702 INSERT TEMP BLADDER CATH: CPT

## 2020-09-06 PROCEDURE — 87899 AGENT NOS ASSAY W/OPTIC: CPT

## 2020-09-06 PROCEDURE — 83036 HEMOGLOBIN GLYCOSYLATED A1C: CPT

## 2020-09-06 PROCEDURE — 2580000003 HC RX 258: Performed by: INTERNAL MEDICINE

## 2020-09-06 PROCEDURE — 86140 C-REACTIVE PROTEIN: CPT

## 2020-09-06 PROCEDURE — 83615 LACTATE (LD) (LDH) ENZYME: CPT

## 2020-09-06 PROCEDURE — 87086 URINE CULTURE/COLONY COUNT: CPT

## 2020-09-06 PROCEDURE — 93005 ELECTROCARDIOGRAM TRACING: CPT | Performed by: FAMILY MEDICINE

## 2020-09-06 PROCEDURE — 82248 BILIRUBIN DIRECT: CPT

## 2020-09-06 PROCEDURE — 83605 ASSAY OF LACTIC ACID: CPT

## 2020-09-06 PROCEDURE — 71045 X-RAY EXAM CHEST 1 VIEW: CPT

## 2020-09-06 PROCEDURE — 85025 COMPLETE CBC W/AUTO DIFF WBC: CPT

## 2020-09-06 PROCEDURE — 87081 CULTURE SCREEN ONLY: CPT

## 2020-09-06 PROCEDURE — 2709999900 HC NON-CHARGEABLE SUPPLY

## 2020-09-06 PROCEDURE — 2100000000 HC CCU R&B

## 2020-09-06 PROCEDURE — 87500 VANOMYCIN DNA AMP PROBE: CPT

## 2020-09-06 PROCEDURE — 96361 HYDRATE IV INFUSION ADD-ON: CPT

## 2020-09-06 PROCEDURE — U0002 COVID-19 LAB TEST NON-CDC: HCPCS

## 2020-09-06 PROCEDURE — 2580000003 HC RX 258: Performed by: FAMILY MEDICINE

## 2020-09-06 PROCEDURE — 99285 EMERGENCY DEPT VISIT HI MDM: CPT

## 2020-09-06 PROCEDURE — 6370000000 HC RX 637 (ALT 250 FOR IP): Performed by: INTERNAL MEDICINE

## 2020-09-06 PROCEDURE — 84145 PROCALCITONIN (PCT): CPT

## 2020-09-06 PROCEDURE — 80053 COMPREHEN METABOLIC PANEL: CPT

## 2020-09-06 PROCEDURE — 71250 CT THORAX DX C-: CPT

## 2020-09-06 PROCEDURE — 87449 NOS EACH ORGANISM AG IA: CPT

## 2020-09-06 PROCEDURE — 87040 BLOOD CULTURE FOR BACTERIA: CPT

## 2020-09-06 PROCEDURE — 99223 1ST HOSP IP/OBS HIGH 75: CPT | Performed by: INTERNAL MEDICINE

## 2020-09-06 PROCEDURE — 6360000002 HC RX W HCPCS: Performed by: INTERNAL MEDICINE

## 2020-09-06 PROCEDURE — 96374 THER/PROPH/DIAG INJ IV PUSH: CPT

## 2020-09-06 PROCEDURE — 84484 ASSAY OF TROPONIN QUANT: CPT

## 2020-09-06 PROCEDURE — 82948 REAGENT STRIP/BLOOD GLUCOSE: CPT

## 2020-09-06 PROCEDURE — 83880 ASSAY OF NATRIURETIC PEPTIDE: CPT

## 2020-09-06 PROCEDURE — 94760 N-INVAS EAR/PLS OXIMETRY 1: CPT

## 2020-09-06 PROCEDURE — 87641 MR-STAPH DNA AMP PROBE: CPT

## 2020-09-06 PROCEDURE — 36415 COLL VENOUS BLD VENIPUNCTURE: CPT

## 2020-09-06 PROCEDURE — 6360000002 HC RX W HCPCS: Performed by: FAMILY MEDICINE

## 2020-09-06 RX ORDER — GABAPENTIN 400 MG/1
800 CAPSULE ORAL 2 TIMES DAILY
Status: DISCONTINUED | OUTPATIENT
Start: 2020-09-06 | End: 2020-09-16 | Stop reason: HOSPADM

## 2020-09-06 RX ORDER — FOLIC ACID 1 MG/1
1 TABLET ORAL DAILY
Status: DISCONTINUED | OUTPATIENT
Start: 2020-09-07 | End: 2020-09-16 | Stop reason: HOSPADM

## 2020-09-06 RX ORDER — AMLODIPINE BESYLATE 5 MG/1
5 TABLET ORAL DAILY
Status: DISCONTINUED | OUTPATIENT
Start: 2020-09-07 | End: 2020-09-16 | Stop reason: HOSPADM

## 2020-09-06 RX ORDER — ARIPIPRAZOLE 15 MG/1
15 TABLET ORAL DAILY
Status: DISCONTINUED | OUTPATIENT
Start: 2020-09-07 | End: 2020-09-16 | Stop reason: HOSPADM

## 2020-09-06 RX ORDER — DEXAMETHASONE SODIUM PHOSPHATE 4 MG/ML
6 INJECTION, SOLUTION INTRA-ARTICULAR; INTRALESIONAL; INTRAMUSCULAR; INTRAVENOUS; SOFT TISSUE ONCE
Status: COMPLETED | OUTPATIENT
Start: 2020-09-06 | End: 2020-09-06

## 2020-09-06 RX ORDER — DEXTROSE MONOHYDRATE 50 MG/ML
100 INJECTION, SOLUTION INTRAVENOUS PRN
Status: DISCONTINUED | OUTPATIENT
Start: 2020-09-06 | End: 2020-09-16 | Stop reason: HOSPADM

## 2020-09-06 RX ORDER — ALLOPURINOL 100 MG/1
200 TABLET ORAL DAILY
Status: DISCONTINUED | OUTPATIENT
Start: 2020-09-06 | End: 2020-09-06 | Stop reason: SDUPTHER

## 2020-09-06 RX ORDER — HYDRALAZINE HYDROCHLORIDE 50 MG/1
50 TABLET, FILM COATED ORAL 2 TIMES DAILY
Status: DISCONTINUED | OUTPATIENT
Start: 2020-09-06 | End: 2020-09-16 | Stop reason: HOSPADM

## 2020-09-06 RX ORDER — DEXTROSE MONOHYDRATE 25 G/50ML
12.5 INJECTION, SOLUTION INTRAVENOUS PRN
Status: DISCONTINUED | OUTPATIENT
Start: 2020-09-06 | End: 2020-09-16 | Stop reason: HOSPADM

## 2020-09-06 RX ORDER — IPRATROPIUM BROMIDE AND ALBUTEROL SULFATE 2.5; .5 MG/3ML; MG/3ML
1 SOLUTION RESPIRATORY (INHALATION) EVERY 4 HOURS PRN
Status: DISCONTINUED | OUTPATIENT
Start: 2020-09-06 | End: 2020-09-16 | Stop reason: HOSPADM

## 2020-09-06 RX ORDER — DEXAMETHASONE SODIUM PHOSPHATE 4 MG/ML
6 INJECTION, SOLUTION INTRA-ARTICULAR; INTRALESIONAL; INTRAMUSCULAR; INTRAVENOUS; SOFT TISSUE DAILY
Status: DISCONTINUED | OUTPATIENT
Start: 2020-09-07 | End: 2020-09-06

## 2020-09-06 RX ORDER — CITALOPRAM 20 MG/1
30 TABLET ORAL DAILY
Status: DISCONTINUED | OUTPATIENT
Start: 2020-09-07 | End: 2020-09-16 | Stop reason: HOSPADM

## 2020-09-06 RX ORDER — ACETAMINOPHEN 325 MG/1
650 TABLET ORAL EVERY 4 HOURS PRN
Status: DISCONTINUED | OUTPATIENT
Start: 2020-09-06 | End: 2020-09-06 | Stop reason: SDUPTHER

## 2020-09-06 RX ORDER — ACETAMINOPHEN 650 MG/1
650 SUPPOSITORY RECTAL EVERY 6 HOURS PRN
Status: DISCONTINUED | OUTPATIENT
Start: 2020-09-06 | End: 2020-09-16 | Stop reason: HOSPADM

## 2020-09-06 RX ORDER — SODIUM CHLORIDE 0.9 % (FLUSH) 0.9 %
10 SYRINGE (ML) INJECTION EVERY 12 HOURS SCHEDULED
Status: DISCONTINUED | OUTPATIENT
Start: 2020-09-06 | End: 2020-09-06 | Stop reason: SDUPTHER

## 2020-09-06 RX ORDER — SODIUM CHLORIDE 0.9 % (FLUSH) 0.9 %
10 SYRINGE (ML) INJECTION EVERY 12 HOURS SCHEDULED
Status: DISCONTINUED | OUTPATIENT
Start: 2020-09-06 | End: 2020-09-16 | Stop reason: HOSPADM

## 2020-09-06 RX ORDER — SODIUM CHLORIDE 0.9 % (FLUSH) 0.9 %
10 SYRINGE (ML) INJECTION PRN
Status: DISCONTINUED | OUTPATIENT
Start: 2020-09-06 | End: 2020-09-06 | Stop reason: SDUPTHER

## 2020-09-06 RX ORDER — ACETAMINOPHEN 325 MG/1
650 TABLET ORAL EVERY 6 HOURS PRN
Status: DISCONTINUED | OUTPATIENT
Start: 2020-09-06 | End: 2020-09-16 | Stop reason: HOSPADM

## 2020-09-06 RX ORDER — SODIUM CHLORIDE 0.9 % (FLUSH) 0.9 %
10 SYRINGE (ML) INJECTION PRN
Status: DISCONTINUED | OUTPATIENT
Start: 2020-09-06 | End: 2020-09-16 | Stop reason: HOSPADM

## 2020-09-06 RX ORDER — NICOTINE POLACRILEX 4 MG
15 LOZENGE BUCCAL PRN
Status: DISCONTINUED | OUTPATIENT
Start: 2020-09-06 | End: 2020-09-16 | Stop reason: HOSPADM

## 2020-09-06 RX ORDER — ATORVASTATIN CALCIUM 40 MG/1
40 TABLET, FILM COATED ORAL NIGHTLY
Status: DISCONTINUED | OUTPATIENT
Start: 2020-09-06 | End: 2020-09-16 | Stop reason: HOSPADM

## 2020-09-06 RX ORDER — SODIUM CHLORIDE 9 MG/ML
INJECTION, SOLUTION INTRAVENOUS CONTINUOUS
Status: DISCONTINUED | OUTPATIENT
Start: 2020-09-06 | End: 2020-09-07

## 2020-09-06 RX ORDER — DILTIAZEM HYDROCHLORIDE 60 MG/1
60 TABLET, FILM COATED ORAL 2 TIMES DAILY
Status: DISCONTINUED | OUTPATIENT
Start: 2020-09-06 | End: 2020-09-16 | Stop reason: HOSPADM

## 2020-09-06 RX ORDER — POLYETHYLENE GLYCOL 3350 17 G/17G
17 POWDER, FOR SOLUTION ORAL EVERY OTHER DAY
Status: DISCONTINUED | OUTPATIENT
Start: 2020-09-07 | End: 2020-09-06 | Stop reason: SDUPTHER

## 2020-09-06 RX ORDER — FUROSEMIDE 40 MG/1
40 TABLET ORAL DAILY
Status: DISCONTINUED | OUTPATIENT
Start: 2020-09-07 | End: 2020-09-16 | Stop reason: HOSPADM

## 2020-09-06 RX ORDER — POLYETHYLENE GLYCOL 3350 17 G/17G
17 POWDER, FOR SOLUTION ORAL EVERY OTHER DAY
Status: DISCONTINUED | OUTPATIENT
Start: 2020-09-07 | End: 2020-09-16 | Stop reason: HOSPADM

## 2020-09-06 RX ORDER — OXYCODONE HYDROCHLORIDE AND ACETAMINOPHEN 5; 325 MG/1; MG/1
1 TABLET ORAL EVERY 4 HOURS PRN
Status: DISCONTINUED | OUTPATIENT
Start: 2020-09-06 | End: 2020-09-16 | Stop reason: HOSPADM

## 2020-09-06 RX ORDER — PROMETHAZINE HYDROCHLORIDE 25 MG/1
12.5 TABLET ORAL EVERY 6 HOURS PRN
Status: DISCONTINUED | OUTPATIENT
Start: 2020-09-06 | End: 2020-09-16 | Stop reason: HOSPADM

## 2020-09-06 RX ORDER — INSULIN GLARGINE 100 [IU]/ML
10 INJECTION, SOLUTION SUBCUTANEOUS NIGHTLY
Status: DISCONTINUED | OUTPATIENT
Start: 2020-09-06 | End: 2020-09-07

## 2020-09-06 RX ORDER — 0.9 % SODIUM CHLORIDE 0.9 %
500 INTRAVENOUS SOLUTION INTRAVENOUS ONCE
Status: COMPLETED | OUTPATIENT
Start: 2020-09-06 | End: 2020-09-06

## 2020-09-06 RX ORDER — BUSPIRONE HYDROCHLORIDE 10 MG/1
10 TABLET ORAL 2 TIMES DAILY
Status: DISCONTINUED | OUTPATIENT
Start: 2020-09-06 | End: 2020-09-16 | Stop reason: HOSPADM

## 2020-09-06 RX ORDER — ONDANSETRON 2 MG/ML
4 INJECTION INTRAMUSCULAR; INTRAVENOUS EVERY 6 HOURS PRN
Status: DISCONTINUED | OUTPATIENT
Start: 2020-09-06 | End: 2020-09-16 | Stop reason: HOSPADM

## 2020-09-06 RX ORDER — METHYLPREDNISOLONE SODIUM SUCCINATE 40 MG/ML
40 INJECTION, POWDER, LYOPHILIZED, FOR SOLUTION INTRAMUSCULAR; INTRAVENOUS DAILY
Status: DISCONTINUED | OUTPATIENT
Start: 2020-09-06 | End: 2020-09-10

## 2020-09-06 RX ORDER — TAMSULOSIN HYDROCHLORIDE 0.4 MG/1
0.4 CAPSULE ORAL NIGHTLY
Status: DISCONTINUED | OUTPATIENT
Start: 2020-09-06 | End: 2020-09-16 | Stop reason: HOSPADM

## 2020-09-06 RX ORDER — PANTOPRAZOLE SODIUM 40 MG/1
40 TABLET, DELAYED RELEASE ORAL DAILY
Status: DISCONTINUED | OUTPATIENT
Start: 2020-09-07 | End: 2020-09-16 | Stop reason: HOSPADM

## 2020-09-06 RX ADMIN — METHYLPREDNISOLONE SODIUM SUCCINATE 40 MG: 40 INJECTION, POWDER, FOR SOLUTION INTRAMUSCULAR; INTRAVENOUS at 16:49

## 2020-09-06 RX ADMIN — APIXABAN 5 MG: 5 TABLET, FILM COATED ORAL at 20:37

## 2020-09-06 RX ADMIN — HYDRALAZINE HYDROCHLORIDE 50 MG: 50 TABLET, FILM COATED ORAL at 20:41

## 2020-09-06 RX ADMIN — DEXAMETHASONE SODIUM PHOSPHATE 6 MG: 4 INJECTION, SOLUTION INTRAMUSCULAR; INTRAVENOUS at 11:59

## 2020-09-06 RX ADMIN — BUSPIRONE HYDROCHLORIDE 10 MG: 10 TABLET ORAL at 20:41

## 2020-09-06 RX ADMIN — CEFEPIME HYDROCHLORIDE 2 G: 2 INJECTION, POWDER, FOR SOLUTION INTRAVENOUS at 16:44

## 2020-09-06 RX ADMIN — ATORVASTATIN CALCIUM 40 MG: 40 TABLET, FILM COATED ORAL at 20:41

## 2020-09-06 RX ADMIN — DILTIAZEM HYDROCHLORIDE 60 MG: 60 TABLET, FILM COATED ORAL at 20:41

## 2020-09-06 RX ADMIN — SODIUM CHLORIDE: 9 INJECTION, SOLUTION INTRAVENOUS at 14:45

## 2020-09-06 RX ADMIN — GABAPENTIN 800 MG: 400 CAPSULE ORAL at 20:41

## 2020-09-06 RX ADMIN — ACETAMINOPHEN 650 MG: 325 TABLET ORAL at 23:57

## 2020-09-06 RX ADMIN — INSULIN GLARGINE 10 UNITS: 100 INJECTION, SOLUTION SUBCUTANEOUS at 20:34

## 2020-09-06 RX ADMIN — INSULIN LISPRO 8 UNITS: 100 INJECTION, SOLUTION INTRAVENOUS; SUBCUTANEOUS at 16:49

## 2020-09-06 RX ADMIN — SODIUM CHLORIDE 500 ML: 9 INJECTION, SOLUTION INTRAVENOUS at 12:00

## 2020-09-06 RX ADMIN — TAMSULOSIN HYDROCHLORIDE 0.4 MG: 0.4 CAPSULE ORAL at 20:41

## 2020-09-06 ASSESSMENT — ENCOUNTER SYMPTOMS
ABDOMINAL PAIN: 0
BLOOD IN STOOL: 0
EYE DISCHARGE: 0
SHORTNESS OF BREATH: 1
COUGH: 1
DIARRHEA: 1

## 2020-09-06 ASSESSMENT — PAIN DESCRIPTION - DESCRIPTORS
DESCRIPTORS: HEADACHE
DESCRIPTORS: HEADACHE;DISCOMFORT
DESCRIPTORS: HEADACHE

## 2020-09-06 ASSESSMENT — PAIN SCALES - GENERAL
PAINLEVEL_OUTOF10: 3
PAINLEVEL_OUTOF10: 4
PAINLEVEL_OUTOF10: 5
PAINLEVEL_OUTOF10: 3
PAINLEVEL_OUTOF10: 5

## 2020-09-06 ASSESSMENT — PAIN DESCRIPTION - PAIN TYPE
TYPE: ACUTE PAIN

## 2020-09-06 ASSESSMENT — PAIN DESCRIPTION - LOCATION
LOCATION: HEAD;GENERALIZED
LOCATION: HEAD

## 2020-09-06 ASSESSMENT — PAIN DESCRIPTION - FREQUENCY
FREQUENCY: INTERMITTENT
FREQUENCY: CONTINUOUS

## 2020-09-06 ASSESSMENT — PAIN DESCRIPTION - ONSET: ONSET: ON-GOING

## 2020-09-06 ASSESSMENT — PAIN - FUNCTIONAL ASSESSMENT: PAIN_FUNCTIONAL_ASSESSMENT: ACTIVITIES ARE NOT PREVENTED

## 2020-09-06 ASSESSMENT — PAIN DESCRIPTION - PROGRESSION: CLINICAL_PROGRESSION: NOT CHANGED

## 2020-09-06 NOTE — ED NOTES
ED to inpatient nurses report    Chief Complaint   Patient presents with    Shortness of Breath      Present to ED from [facility]  LOC: alert and orientated to name, place, date  Vital signs   Vitals:    09/06/20 1115 09/06/20 1230 09/06/20 1332   BP: 126/74 122/74    Pulse: 87 92 90   Resp: 28 26 24   Temp: 99.5 °F (37.5 °C)     TempSrc: Oral     SpO2: 94% 96% 97%      Oxygen Baseline 10L NRB    Current needs required tele, oxygen Bipap/Cpap No  LDAs:   Peripheral IV 09/06/20 Left Hand (Active)   Site Assessment Clean;Dry; Intact 09/06/20 1333   Line Status Normal saline locked 09/06/20 1333   Dressing Status Intact;Dry;Clean 09/06/20 1333     Mobility: Requires assistance * 2  Pending ED orders: none  Present condition: stable    Electronically signed by Kirsten Lloyd RN on 9/6/2020 at 1:34 PM       Kirsten Lloyd RN  09/06/20 1330

## 2020-09-06 NOTE — ED NOTES
Pt presents to ED via EMS from [facility]. Pt is covid positive and per stevie burk, they went to check on him this morning and his pulse ox was in the 50s. Pt placed on 10L NRB and per stevie burk his oxygen came back up to the 90s. Pt states he has had covid for three weeks and was feeling ok until about 3 days ago. Pt states he started to get a headache again and states he started to feel more sob. Pt states he felt much more sob today. Pt was given tylenol prior to arrival. Per [facility] pts temp was 100.3, pt temp now 99.5. Pt alert and oriented X4, resp even and unlabored, call light in reach, will continue to monitor.       Washington Martel RN  09/06/20 3701

## 2020-09-06 NOTE — ED NOTES
Bed: 002A  Expected date: 9/6/20  Expected time: 11:10 AM  Means of arrival: [facility]  Comments:     Andrzej Anthony RN  09/06/20 1116

## 2020-09-06 NOTE — ED NOTES
Pt assisted to use urinal on side of bed, pt now back in bed with bed rails up x2, call light in reach, will continue to monitor.       Radha Rivera RN  09/06/20 1359

## 2020-09-06 NOTE — ED PROVIDER NOTES
essential, Hypogonadism, male, Major depression, Mood disorder ([address] Morbid obesity ([facility] [address] Muscle weakness, DURAN (nonalcoholic steatohepatitis), Obstructive sleep apnea treated with continuous positive airway pressure (CPAP), KIARA (obstructive sleep apnea), and Vitamin D deficiency. SURGICAL HISTORY      has a past surgical history that includes tracheostomy; Foot surgery (Right); and hip surgery (Left, 6/29/2020). CURRENT MEDICATIONS       Previous Medications    ACETAMINOPHEN (TYLENOL) 325 MG TABLET    Take 650 mg by mouth every 4 hours as needed for Pain    ALLOPURINOL (ZYLOPRIM) 100 MG TABLET    Take 2 tablets by mouth daily    ALLOPURINOL (ZYLOPRIM) 300 MG TABLET    Take 1 tablet by mouth daily    AMLODIPINE (NORVASC) 10 MG TABLET    Take 5 mg by mouth daily     APIXABAN (ELIQUIS) 5 MG TABS TABLET    Take by mouth 2 times daily    ARIPIPRAZOLE (ABILIFY PO)    Take 15 mg by mouth daily     ATORVASTATIN (LIPITOR) 40 MG TABLET    Take 40 mg by mouth nightly    BENZOCAINE (BABY ORAJEL) 7.5 % ORAL GEL    Take by mouth 4 times daily as needed for Pain (mouth pain) Take by mouth 3 times daily. BLOOD GLUCOSE MONITORING SUPPL (FREESTYLE LITE) ARTIE    Patient needs all supplies for qd testing. DX: 250.00    BUSPIRONE (BUSPAR) 10 MG TABLET    Take 10 mg by mouth 2 times daily     CHOLECALCIFEROL PO    Take 2,000 Units by mouth daily    CITALOPRAM HYDROBROMIDE (CELEXA PO)    Take 30 mg by mouth daily From [facility] professional services     DIAPER RASH PRODUCTS (PINXAV) OINT    Apply topically    DIAZEPAM (VALIUM) 5 MG TABLET    Take 5 mg by mouth every 6 hours as needed for Anxiety.     DILTIAZEM (CARDIZEM) 60 MG TABLET    Take 60 mg by mouth 2 times daily    ESOMEPRAZOLE MAGNESIUM (NEXIUM) 20 MG PACK    Take 20 mg by mouth daily    FOLIC ACID (FOLVITE) 1 MG TABLET    Take 1 mg by mouth daily    FUROSEMIDE (LASIX) 40 MG TABLET    Take 40 mg by mouth daily    GABAPENTIN (NEURONTIN) 400 MG CAPSULE    Take 800 mg by mouth 2 times daily. GUAIFENESIN (ROBITUSSIN) 100 MG/5ML SYRUP    Take 200 mg by mouth every 4 hours as needed for Cough    HYDRALAZINE (APRESOLINE) 50 MG TABLET    Take 50 mg by mouth 2 times daily     INSULIN GLARGINE (LANTUS) 100 UNIT/ML INJECTION VIAL    Inject 10 Units into the skin nightly     INSULIN LISPRO (HUMALOG) 100 UNIT/ML INJECTION VIAL    Inject into the skin 3 times daily (before meals) Per scale: 151-200=1 unit, 201-250=2 units, 251-300-3 units, 301-350=4 units, 351-400=5 units. LIDOCAINE (XYLOCAINE) 5 % OINTMENT    Apply topically as needed to painful areas on lower back, hips, knees, and feet    MULTIPLE VITAMINS-MINERALS (THERAPEUTIC MULTIVITAMIN-MINERALS) TABLET    Take 1 tablet by mouth daily    ONDANSETRON (ZOFRAN) 4 MG TABLET    Take 4 mg by mouth every 8 hours as needed for Nausea or Vomiting    OXYCODONE-ACETAMINOPHEN (PERCOCET) 5-325 MG PER TABLET    Take 1 tablet by mouth every 4 hours as needed for Pain. PANTOPRAZOLE (PROTONIX) 40 MG TABLET    Take 1 tablet by mouth daily    POLYETHYLENE GLYCOL (GLYCOLAX) POWDER    Take 17 g by mouth every other day     PREDNISONE (DELTASONE) 20 MG TABLET    Take 0.5 tablets by mouth daily for 28 days    PROBIOTIC PRODUCT (SOBIA-BID PROBIOTIC PO)    Take 1 tablet by mouth daily     SENNA (SENOKOT) 8.6 MG TABLET    Take 1 tablet by mouth 2 times daily    SITAGLIPTAN-METFORMIN (JANUMET)  MG PER TABLET    Take 1 tablet by mouth 2 times daily (with meals)    TAMSULOSIN (FLOMAX) 0.4 MG CAPSULE    Take 0.4 mg by mouth nightly     TROLAMINE SALICYLATE (ASPERCREME) 10 % LOTN    Apply topically as needed for Pain    VITAMIN C (ASCORBIC ACID) 500 MG TABLET    Take 500 mg by mouth 2 times daily     WOUND DRESSINGS (MAXORB EX)    Apply topically       ALLERGIES     is allergic to pcn [penicillins]. FAMILY HISTORY     He indicated that his mother is . He indicated that his father is alive. He indicated that his brother is alive.  He indicated that his daughter is alive. He indicated that both of his sons are alive. He indicated that the status of his paternal aunt is unknown.   family history includes Cancer in his paternal aunt; Heart Disease in his mother. SOCIAL HISTORY      reports that he has never smoked. He has never used smokeless tobacco. He reports previous alcohol use. He reports that he does not use drugs. PHYSICAL EXAM     INITIAL VITALS:  oral temperature is 99.5 °F (37.5 °C). His blood pressure is 122/74 and his pulse is 92. His respiration is 26 and oxygen saturation is 96%. Physical Exam  Vitals signs and nursing note reviewed. Constitutional:       Comments: GCS 15   HENT:      Head: Normocephalic and atraumatic. Eyes:      Pupils: Pupils are equal, round, and reactive to light. Neck:      Musculoskeletal: Normal range of motion and neck supple. Comments: No JVD  Cardiovascular:      Rate and Rhythm: Normal rate and regular rhythm. Abdominal:      Palpations: Abdomen is soft. Tenderness: There is no abdominal tenderness. Musculoskeletal:         General: No swelling. Skin:     General: Skin is warm and dry. Neurological:      General: No focal deficit present. Mental Status: He is alert. Psychiatric:         Mood and Affect: Mood normal.         Behavior: Behavior normal.           DIFFERENTIAL DIAGNOSIS:     Dyspnea with hypoxemia improved with oxygen by nonrebreather facemask    COVID positive at the nursing home        DIAGNOSTIC RESULTS     EKG: All EKG's are interpreted by the Emergency Department Physician who either signs or Co-signs this chart in the absence of a cardiologist.    EKG showed sinus rhythm with rate 101. QRS complexes show -13 degree axis, 84 ms conduction.   Slow R wave progress V1 to V3 possible old septal MI  ST-T waves show no acute change        RADIOLOGY: non-plain film images(s) such as CT, Ultrasound and MRI are read by the radiologist.  The patient had a single view X-ray of the chest which demonstrates bilateral infiltrates consistent with COVID-19 infection.     [x] Visualized and interpreted by me   [x] Radiologist's Wet Read Report Reviewed   [] Discussed with Radiologist.    LABS:   Labs Reviewed   CBC WITH AUTO DIFFERENTIAL - Abnormal; Notable for the following components:       Result Value    WBC 11.6 (*)     RBC 4.50 (*)     Hemoglobin 13.3 (*)     MCHC 31.5 (*)     RDW-CV 17.1 (*)     RDW-SD 58.5 (*)     Segs Absolute 9.5 (*)     Lymphocytes Absolute 0.9 (*)     Immature Grans (Abs) 0.12 (*)     All other components within normal limits   BASIC METABOLIC PANEL - Abnormal; Notable for the following components:    Glucose 252 (*)     All other components within normal limits   HEPATIC FUNCTION PANEL - Abnormal; Notable for the following components:    Alb 3.1 (*)     All other components within normal limits   C-REACTIVE PROTEIN - Abnormal; Notable for the following components:    CRP 9.00 (*)     All other components within normal limits   LACTATE DEHYDROGENASE - Abnormal; Notable for the following components:     (*)     All other components within normal limits   PROCALCITONIN - Abnormal; Notable for the following components:    Procalcitonin 0.37 (*)     All other components within normal limits   COVID-19 - Abnormal; Notable for the following components:    SARS-CoV-2, NAAT DETECTED (*)     All other components within normal limits   CULTURE, BLOOD 1   CULTURE, BLOOD 2   BRAIN NATRIURETIC PEPTIDE   LACTIC ACID, PLASMA   TROPONIN   ANION GAP   OSMOLALITY   GLOMERULAR FILTRATION RATE, ESTIMATED       EMERGENCY DEPARTMENT COURSE:   Vitals:    Vitals:    09/06/20 1115 09/06/20 1230   BP: 126/74 122/74   Pulse: 87 92   Resp: 28 26   Temp: 99.5 °F (37.5 °C)    TempSrc: Oral    SpO2: 94% 96%     Nursing notes reviewed    IV hydration    Kept on O2 by nonrebreather facemask 95% sat    Decadron 6 mg IV    COVID-19 confirmed positive    WBC 11,600    BNP

## 2020-09-06 NOTE — ED NOTES
Pt presents to ED via EMS, upon arrival, when O2 turned off from NRB, pt oxygen dropped to 81% on room air.  Pt placed back on 10L NRB and pt oxygen 95%     Nkechi Hsu RN  09/06/20 [id number]

## 2020-09-06 NOTE — FLOWSHEET NOTE
1420-Pt received from ER. Pt with Blowing snorous respirations. Pt arouses to voice and able to answer questions appropriately, c/o headache 3/10. Continues on NRB mask. LS with course wheezes     Skin assessment complete by 2 RN's. Pt has healing pressure ulcer on left thigh. Dr Hiwot Pires at bedside. States to get pt on high flow. States he wants CT scan of chest.     Pt anxious-states he doesn't really want anything else until he can eat. Reinforced that pt was unable to eat until the CT scan was complete     1525-Pt to CT scan with charge RN. 1600-Pt returned from CT.

## 2020-09-06 NOTE — ED NOTES
Pt continues resting on cot with resp even and unlabored. Pt informed on updated plan of care. Pt denies needs at this time. Call light in reach, will continue to monitor.       Martina Huston RN  09/06/20 4334

## 2020-09-07 LAB
ANION GAP SERPL CALCULATED.3IONS-SCNC: 9 MEQ/L (ref 8–16)
BASOPHILS # BLD: 0.2 %
BASOPHILS ABSOLUTE: 0 THOU/MM3 (ref 0–0.1)
BUN BLDV-MCNC: 23 MG/DL (ref 7–22)
CALCIUM SERPL-MCNC: 8.8 MG/DL (ref 8.5–10.5)
CHLORIDE BLD-SCNC: 98 MEQ/L (ref 98–111)
CO2: 30 MEQ/L (ref 23–33)
CREAT SERPL-MCNC: 1 MG/DL (ref 0.4–1.2)
EOSINOPHIL # BLD: 0 %
EOSINOPHILS ABSOLUTE: 0 THOU/MM3 (ref 0–0.4)
ERYTHROCYTE [DISTWIDTH] IN BLOOD BY AUTOMATED COUNT: 16.4 % (ref 11.5–14.5)
ERYTHROCYTE [DISTWIDTH] IN BLOOD BY AUTOMATED COUNT: 55.6 FL (ref 35–45)
GFR SERPL CREATININE-BSD FRML MDRD: > 90 ML/MIN/1.73M2
GLUCOSE BLD-MCNC: 307 MG/DL (ref 70–108)
GLUCOSE BLD-MCNC: 314 MG/DL (ref 70–108)
GLUCOSE BLD-MCNC: 354 MG/DL (ref 70–108)
GLUCOSE BLD-MCNC: 358 MG/DL (ref 70–108)
GLUCOSE BLD-MCNC: 410 MG/DL (ref 70–108)
HCT VFR BLD CALC: 39.7 % (ref 42–52)
HEMOGLOBIN: 12.8 GM/DL (ref 14–18)
IMMATURE GRANS (ABS): 0.12 THOU/MM3 (ref 0–0.07)
IMMATURE GRANULOCYTES: 1.1 %
LACTIC ACID: 1.3 MMOL/L (ref 0.5–2.2)
LEGIONELLA PNEUMOPHILIA AG, URINE: NEGATIVE
LYMPHOCYTES # BLD: 4.9 %
LYMPHOCYTES ABSOLUTE: 0.5 THOU/MM3 (ref 1–4.8)
MAGNESIUM: 1.8 MG/DL (ref 1.6–2.4)
MCH RBC QN AUTO: 30.1 PG (ref 26–33)
MCHC RBC AUTO-ENTMCNC: 32.2 GM/DL (ref 32.2–35.5)
MCV RBC AUTO: 93.4 FL (ref 80–94)
MONOCYTES # BLD: 3.9 %
MONOCYTES ABSOLUTE: 0.4 THOU/MM3 (ref 0.4–1.3)
NUCLEATED RED BLOOD CELLS: 0 /100 WBC
PLATELET # BLD: 152 THOU/MM3 (ref 130–400)
PMV BLD AUTO: 12.4 FL (ref 9.4–12.4)
POTASSIUM REFLEX MAGNESIUM: 4.6 MEQ/L (ref 3.5–5.2)
PROCALCITONIN: 0.28 NG/ML (ref 0.01–0.09)
RBC # BLD: 4.25 MILL/MM3 (ref 4.7–6.1)
SEG NEUTROPHILS: 89.9 %
SEGMENTED NEUTROPHILS ABSOLUTE COUNT: 9.4 THOU/MM3 (ref 1.8–7.7)
SODIUM BLD-SCNC: 137 MEQ/L (ref 135–145)
STREP PNEUMO AG, UR: NEGATIVE
WBC # BLD: 10.5 THOU/MM3 (ref 4.8–10.8)

## 2020-09-07 PROCEDURE — 93010 ELECTROCARDIOGRAM REPORT: CPT | Performed by: INTERNAL MEDICINE

## 2020-09-07 PROCEDURE — 94761 N-INVAS EAR/PLS OXIMETRY MLT: CPT

## 2020-09-07 PROCEDURE — 85025 COMPLETE CBC W/AUTO DIFF WBC: CPT

## 2020-09-07 PROCEDURE — 6360000002 HC RX W HCPCS: Performed by: INTERNAL MEDICINE

## 2020-09-07 PROCEDURE — 6370000000 HC RX 637 (ALT 250 FOR IP): Performed by: INTERNAL MEDICINE

## 2020-09-07 PROCEDURE — 83605 ASSAY OF LACTIC ACID: CPT

## 2020-09-07 PROCEDURE — 2580000003 HC RX 258: Performed by: INTERNAL MEDICINE

## 2020-09-07 PROCEDURE — 99291 CRITICAL CARE FIRST HOUR: CPT | Performed by: INTERNAL MEDICINE

## 2020-09-07 PROCEDURE — 36415 COLL VENOUS BLD VENIPUNCTURE: CPT

## 2020-09-07 PROCEDURE — 82948 REAGENT STRIP/BLOOD GLUCOSE: CPT

## 2020-09-07 PROCEDURE — 2700000000 HC OXYGEN THERAPY PER DAY

## 2020-09-07 PROCEDURE — 84145 PROCALCITONIN (PCT): CPT

## 2020-09-07 PROCEDURE — 2100000000 HC CCU R&B

## 2020-09-07 PROCEDURE — 94660 CPAP INITIATION&MGMT: CPT

## 2020-09-07 PROCEDURE — 83735 ASSAY OF MAGNESIUM: CPT

## 2020-09-07 PROCEDURE — 80048 BASIC METABOLIC PNL TOTAL CA: CPT

## 2020-09-07 RX ORDER — INSULIN GLARGINE 100 [IU]/ML
30 INJECTION, SOLUTION SUBCUTANEOUS NIGHTLY
Status: DISCONTINUED | OUTPATIENT
Start: 2020-09-07 | End: 2020-09-08

## 2020-09-07 RX ADMIN — INSULIN LISPRO 10 UNITS: 100 INJECTION, SOLUTION INTRAVENOUS; SUBCUTANEOUS at 16:43

## 2020-09-07 RX ADMIN — DILTIAZEM HYDROCHLORIDE 60 MG: 60 TABLET, FILM COATED ORAL at 08:37

## 2020-09-07 RX ADMIN — PANTOPRAZOLE SODIUM 40 MG: 40 TABLET, DELAYED RELEASE ORAL at 08:18

## 2020-09-07 RX ADMIN — FOLIC ACID 1 MG: 1 TABLET ORAL at 08:36

## 2020-09-07 RX ADMIN — HYDRALAZINE HYDROCHLORIDE 50 MG: 50 TABLET, FILM COATED ORAL at 08:37

## 2020-09-07 RX ADMIN — CEFEPIME HYDROCHLORIDE 2 G: 2 INJECTION, POWDER, FOR SOLUTION INTRAVENOUS at 16:04

## 2020-09-07 RX ADMIN — INSULIN LISPRO 12 UNITS: 100 INJECTION, SOLUTION INTRAVENOUS; SUBCUTANEOUS at 11:32

## 2020-09-07 RX ADMIN — INSULIN LISPRO 8 UNITS: 100 INJECTION, SOLUTION INTRAVENOUS; SUBCUTANEOUS at 08:17

## 2020-09-07 RX ADMIN — TAMSULOSIN HYDROCHLORIDE 0.4 MG: 0.4 CAPSULE ORAL at 21:02

## 2020-09-07 RX ADMIN — INSULIN GLARGINE 30 UNITS: 100 INJECTION, SOLUTION SUBCUTANEOUS at 21:04

## 2020-09-07 RX ADMIN — POLYETHYLENE GLYCOL 3350 17 G: 17 POWDER, FOR SOLUTION ORAL at 08:18

## 2020-09-07 RX ADMIN — SODIUM CHLORIDE: 9 INJECTION, SOLUTION INTRAVENOUS at 03:35

## 2020-09-07 RX ADMIN — BUSPIRONE HYDROCHLORIDE 10 MG: 10 TABLET ORAL at 21:02

## 2020-09-07 RX ADMIN — ENOXAPARIN SODIUM 135 MG: 150 INJECTION SUBCUTANEOUS at 21:01

## 2020-09-07 RX ADMIN — HYDRALAZINE HYDROCHLORIDE 50 MG: 50 TABLET, FILM COATED ORAL at 21:02

## 2020-09-07 RX ADMIN — Medication 10 ML: at 21:01

## 2020-09-07 RX ADMIN — AMLODIPINE BESYLATE 5 MG: 5 TABLET ORAL at 08:37

## 2020-09-07 RX ADMIN — ALLOPURINOL 500 MG: 300 TABLET ORAL at 08:36

## 2020-09-07 RX ADMIN — DILTIAZEM HYDROCHLORIDE 60 MG: 60 TABLET, FILM COATED ORAL at 21:02

## 2020-09-07 RX ADMIN — APIXABAN 5 MG: 5 TABLET, FILM COATED ORAL at 08:37

## 2020-09-07 RX ADMIN — GABAPENTIN 800 MG: 400 CAPSULE ORAL at 21:01

## 2020-09-07 RX ADMIN — CITALOPRAM 30 MG: 20 TABLET, FILM COATED ORAL at 08:37

## 2020-09-07 RX ADMIN — BUSPIRONE HYDROCHLORIDE 10 MG: 10 TABLET ORAL at 08:36

## 2020-09-07 RX ADMIN — METHYLPREDNISOLONE SODIUM SUCCINATE 40 MG: 40 INJECTION, POWDER, FOR SOLUTION INTRAMUSCULAR; INTRAVENOUS at 08:38

## 2020-09-07 RX ADMIN — FUROSEMIDE 40 MG: 40 TABLET ORAL at 08:37

## 2020-09-07 RX ADMIN — ARIPIPRAZOLE 15 MG: 15 TABLET ORAL at 08:36

## 2020-09-07 RX ADMIN — CEFEPIME HYDROCHLORIDE 2 G: 2 INJECTION, POWDER, FOR SOLUTION INTRAVENOUS at 03:35

## 2020-09-07 RX ADMIN — ATORVASTATIN CALCIUM 40 MG: 40 TABLET, FILM COATED ORAL at 21:02

## 2020-09-07 RX ADMIN — GABAPENTIN 800 MG: 400 CAPSULE ORAL at 08:36

## 2020-09-07 ASSESSMENT — PAIN SCALES - GENERAL
PAINLEVEL_OUTOF10: 0

## 2020-09-07 NOTE — PLAN OF CARE
Problem: Falls - Risk of:  Goal: Will remain free from falls  Description: Will remain free from falls  Outcome: Ongoing  Note: Pt remains free of falls during the night. Bed in the lowest position, items and call light placed within reach, side rails up x2, and bed alarm on. Continue to monitor. Problem: Gas Exchange - Impaired  Goal: Absence of hypoxia  Outcome: Ongoing  Note: Pt SpO2 remains >90% during the night. Continue to monitor. Problem: Airway Clearance - Ineffective  Goal: Achieve or maintain patent airway  Outcome: Ongoing  Note: Pt is able to maintain a patent airway. Problem: Breathing Pattern - Ineffective  Goal: Ability to achieve and maintain a regular respiratory rate  Outcome: Ongoing  Note: Pt remains tachypneic during the night. Problem: Body Temperature -  Risk of, Imbalanced  Goal: Ability to maintain a body temperature within defined limits  Outcome: Ongoing  Note: Pt's body temp remains within defined limits. Problem: Isolation Precautions - Risk of Spread of Infection  Goal: Prevent transmission of infection  Outcome: Ongoing  Note: Pt remains on contact plus precautions. Problem: Risk for Fluid Volume Deficit  Goal: Maintain absence of muscle cramping  Outcome: Ongoing  Note: Pt does not experience any muscle cramping during the night. Problem: Pain:  Goal: Pain level will decrease  Description: Pain level will decrease  Outcome: Ongoing  Note: Pt complains of a headache and rates it a 3 on a scale of 1-10. Pt request PRN tylenol. Tylenol is administered and pt is satisfied.

## 2020-09-07 NOTE — PROGRESS NOTES
Intensive Care Unit  Intensivist Progress Note    Patient: Liborio Barrett  : 1970  MRN#: [id number]  2020 3:43 PM  ADMISSION DAY:  2020 11:12 AM     Subjective:  Patient feels okay. He is afebrile. He is tolerating high flow oxygen, currently on 80%. No diarrhea, no nausea or vomiting. HPI:  Liborio Barrett is a 44 y.o. male admitted for shortness of breath  The patient has history of obesity, obstructive sleep apnea, anxiety, depression, hypertension, hyperlipidemia, rheumatoid arthritis, type 2 diabetes mellitus, atrial fibrillation who was diagnosed with COVID pneumonia and admitted to [facility] last month. The patient was treated with Decadron, Remdesivir and azithromycin and then he was discharged on . The patient was brought to the emergency department via EMS today because he was found hypoxic in the nursing home. He was brought on nonrebreather mask, he was taken off oxygen for short period of time in the ER and he dropped to 81%. He was placed back on nonrebreather. The patient had low-grade fever there up to 100.3. His temperature in the emergency department was 99.5. He had some diarrhea. Repeat COVID test today is positive. Patient Vitals for the past 8 hrs:   BP Temp Temp src Pulse Resp SpO2   20 1405 101/73 -- -- 76 25 94 %   20 1300 (!) 145/86 -- -- 69 17 94 %   20 1225 -- -- -- -- 21 --   20 1200 138/73 98.6 °F (37 °C) Bladder 77 27 91 %   20 1100 120/80 -- -- 82 23 93 %   20 1005 110/67 -- -- 83 14 93 %   20 0905 105/71 -- -- 79 14 95 %   20 0900 -- -- -- -- -- 95 %   20 0836 119/75 -- -- -- -- --   20 0800 119/75 98.6 °F (37 °C) Bladder 81 15 96 %       EXAM:  General Appearance  Awake, alert, oriented, in no acute distress  HEENT - Normal, Head is normocephalic, atraumatic.  EOMI, PERRLA  Neck - Supple, symmetrical, trachea midline and Soft, trachea midline and straight  Lungs - no wheezes, rales or rhonchi, decreased breath sounds bilaterally. Cardiovascular -normal rate without murmur, gallop or rub. Abdomen - Soft, nontender, nondistended, no masses or organomegaly  Neurologic - CN II-XII are grossly intact. There are no focal motor or sensory deficits  Skin - No bruising or bleeding  Extremities - No cyanosis, clubbing or edema      Intake/Output Summary (Last 24 hours) at 9/7/2020 1543  Last data filed at 9/7/2020 1432  Gross per 24 hour   Intake 2798.84 ml   Output 3900 ml   Net -1101.16 ml     I/O last 3 completed shifts: In: 2798.8 [P.O.:960; I.V.:1838.8]  Out: 3900 [Urine:3900]   Date 09/07/20 0000 - 09/07/20 2359   Shift 1733-5417 2065-4169 9479-6194 24 Hour Total   INTAKE   P.O.(mL/kg/hr) 100(0.1) 500  600   I. V.(mL/kg) 392.4(2.9) 972(7.2)  1364. 4(10.1)   Shift Total(mL/kg) 492.4(3.6) 1472(10.9)  1964. 4(14.5)   OUTPUT   Urine(mL/kg/hr) 800(0.7) 2000  2800   Shift Total(mL/kg) 800(5.9) 2000(14.8)  2800(20.7)   Weight (kg) 135.5 135.5 135.5 135.5     Wt Readings from Last 3 Encounters:   09/07/20 298 lb 11.6 oz (135.5 kg)   08/21/20 (!) 306 lb 6.4 oz (139 kg)   08/05/20 299 lb 13.2 oz (136 kg)      Body mass index is 45.42 kg/m².          Scheduled Meds:   allopurinol  500 mg Oral Daily    amLODIPine  5 mg Oral Daily    apixaban  5 mg Oral BID    ARIPiprazole  15 mg Oral Daily    atorvastatin  40 mg Oral Nightly    busPIRone  10 mg Oral BID    citalopram  30 mg Oral Daily    dilTIAZem  60 mg Oral BID    folic acid  1 mg Oral Daily    furosemide  40 mg Oral Daily    gabapentin  800 mg Oral BID    hydrALAZINE  50 mg Oral BID    insulin glargine  10 Units Subcutaneous Nightly    pantoprazole  40 mg Oral Daily    tamsulosin  0.4 mg Oral Nightly    sodium chloride flush  10 mL Intravenous 2 times per day    insulin lispro  0-12 Units Subcutaneous TID     insulin lispro  0-6 Units Subcutaneous Nightly    cefepime  2 g Intravenous Q12H    methylPREDNISolone  40 mg Intravenous Daily    polyethylene glycol  17 g Oral Every Other Day     Continuous Infusions:   sodium chloride 50 mL/hr at 09/07/20 0335    dextrose       PRN Meds:oxyCODONE-acetaminophen, 1 tablet, Q4H PRN  sodium chloride flush, 10 mL, PRN  acetaminophen, 650 mg, Q6H PRN    Or  acetaminophen, 650 mg, Q6H PRN  promethazine, 12.5 mg, Q6H PRN    Or  ondansetron, 4 mg, Q6H PRN  ipratropium-albuterol, 1 ampule, Q4H PRN  glucose, 15 g, PRN  dextrose, 12.5 g, PRN  glucagon (rDNA), 1 mg, PRN  dextrose, 100 mL/hr, PRN        PARENTERAL VASOACTIVE / INOTROPIC AGENTS:    SEDATION/ANALGESIA:      ICU PROPHYLAXIS/THERAPY:   Stress ulcer: [x] PPI Agent  [] H2RA  [] Sucralfate [] Other:   VTE: [x] Enoxaparin     [] Warfarin  [] NOAC     [] PCD Device:Bilat LE           [] Heparin: [] Subcut / [] IV    NUTRITION SUPPORT:    SUPPORT DEVICES:    Oxygen Delivery - O2 Flow Rate (L/min): 60 L/min  VENT SETTINGS (Comprehensive)  Vent Information  Skin Assessment: Clean, dry, & intact  Equipment ID: C  FiO2 : 60 %  SpO2: 94 %  SpO2/FiO2 ratio: 130  Humidification Source: Heated wire  Humidification Temp: 37  Humidification Temp Measured: 37  Mask Type: Full face mask  Mask Size: Large  Additional Respiratory  Assessments  Pulse: 76  Resp: 25  SpO2: 94 %  Humidification Source: Heated wire  Humidification Temp: 37      DATA:    CBC:   Recent Labs     09/06/20  1153 09/07/20 0416   WBC 11.6* 10.5   RBC 4.50* 4.25*   HGB 13.3* 12.8*   HCT 42.2 39.7*   MCV 93.8 93.4   MCH 29.6 30.1   MCHC 31.5* 32.2    152   MPV 11.4 12.4      BMP/CMP:   Recent Labs     09/06/20  1153 09/07/20 0416    137   K 3.9 4.6    98   CO2 29 30   ANIONGAP 13.0 9.0   BUN 22 23*   CREATININE 1.0 1.0   GLUCOSE 252* 307*   CALCIUM 9.1 8.8   PROT 6.3  --    LABALBU 3.1*  --    BILITOT 0.5  --    ALKPHOS 86  --    AST 16  --    ALT 29  --       Other Electrolytes:   Recent Labs     09/07/20  0416   MG 1.8     Serum Osmolality  No results for

## 2020-09-07 NOTE — PLAN OF CARE
Problem: Falls - Risk of:  Goal: Will remain free from falls  Description: Will remain free from falls  9/7/2020 1106 by Laura Velazquez RN  Outcome: Met This Shift  Note: Remains in bed at this time and fall free. Bed alarm on and call light in hand      Problem: Body Temperature -  Risk of, Imbalanced  Goal: Ability to maintain a body temperature within defined limits  9/7/2020 1106 by Laura Velzaquez RN  Outcome: Met This Shift  Note: Afebrile at this time      Problem: Body Temperature -  Risk of, Imbalanced  Goal: Will regain or maintain usual level of consciousness  9/7/2020 1106 by Laura Velazquez RN  Outcome: Met This Shift  Note: Alert and oriented      Problem: Isolation Precautions - Risk of Spread of Infection  Goal: Prevent transmission of infection  9/7/2020 1106 by Laura Velazquez RN  Outcome: Met This Shift  Note: Droplet and contact precautions in place at this time      Problem: Nutrition Deficits  Goal: Optimize nutrtional status  9/7/2020 1106 by Laura Velazquez RN  Outcome: Met This Shift  Note: Eating well      Problem: Risk for Fluid Volume Deficit  Goal: Maintain normal heart rhythm  9/7/2020 1106 by Laura Velazquez RN  Outcome: Met This Shift  Note: NSR      Problem: Pain:  Goal: Pain level will decrease  Description: Pain level will decrease  9/7/2020 1106 by Laura Velazquez RN  Outcome: Met This Shift  Note: No pain reported      Problem: Falls - Risk of:  Goal: Absence of physical injury  Description: Absence of physical injury  9/7/2020 1106 by Laura Velazquez RN  Outcome: Ongoing  Note: No signs of injury at this time.       Problem: Gas Exchange - Impaired  Goal: Absence of hypoxia  9/7/2020 1106 by Laura Velazquez RN  Outcome: Ongoing  Note: On high vivek and weaning O2 needs at this time      Problem: Gas Exchange - Impaired  Goal: Promote optimal lung function  9/7/2020 1106 by Laura Velazquez RN  Outcome: Ongoing     Problem: Breathing Pattern - Ineffective  Goal: Ability to achieve and maintain a regular respiratory rate  9/7/2020 1106 by Gail Way RN  Outcome: Ongoing  Note: Non labored breathing at this time. Problem: Body Temperature -  Risk of, Imbalanced  Goal: Complications related to the disease process, condition or treatment will be avoided or minimized  9/7/2020 1106 by Gail Way RN  Outcome: Ongoing  Note: Weaning O2 levels      Care plan reviewed with patient. Patient verbalize understanding of the plan of care and contribute to goal setting.

## 2020-09-08 LAB
ANION GAP SERPL CALCULATED.3IONS-SCNC: 10 MEQ/L (ref 8–16)
BASOPHILS # BLD: 0.2 %
BASOPHILS ABSOLUTE: 0 THOU/MM3 (ref 0–0.1)
BUN BLDV-MCNC: 28 MG/DL (ref 7–22)
CALCIUM SERPL-MCNC: 8.9 MG/DL (ref 8.5–10.5)
CHLORIDE BLD-SCNC: 94 MEQ/L (ref 98–111)
CO2: 28 MEQ/L (ref 23–33)
CREAT SERPL-MCNC: 1.1 MG/DL (ref 0.4–1.2)
EOSINOPHIL # BLD: 0 %
EOSINOPHILS ABSOLUTE: 0 THOU/MM3 (ref 0–0.4)
ERYTHROCYTE [DISTWIDTH] IN BLOOD BY AUTOMATED COUNT: 15.9 % (ref 11.5–14.5)
ERYTHROCYTE [DISTWIDTH] IN BLOOD BY AUTOMATED COUNT: 53.3 FL (ref 35–45)
GFR SERPL CREATININE-BSD FRML MDRD: 85 ML/MIN/1.73M2
GLUCOSE BLD-MCNC: 270 MG/DL (ref 70–108)
GLUCOSE BLD-MCNC: 293 MG/DL (ref 70–108)
GLUCOSE BLD-MCNC: 404 MG/DL (ref 70–108)
GLUCOSE BLD-MCNC: 406 MG/DL (ref 70–108)
GLUCOSE BLD-MCNC: 451 MG/DL (ref 70–108)
HCT VFR BLD CALC: 41.4 % (ref 42–52)
HEMOGLOBIN: 13.1 GM/DL (ref 14–18)
IMMATURE GRANS (ABS): 0.15 THOU/MM3 (ref 0–0.07)
IMMATURE GRANULOCYTES: 1.5 %
LACTIC ACID: 1.8 MMOL/L (ref 0.5–2.2)
LYMPHOCYTES # BLD: 4.6 %
LYMPHOCYTES ABSOLUTE: 0.5 THOU/MM3 (ref 1–4.8)
MAGNESIUM: 2 MG/DL (ref 1.6–2.4)
MAGNESIUM: 2.1 MG/DL (ref 1.6–2.4)
MCH RBC QN AUTO: 29.2 PG (ref 26–33)
MCHC RBC AUTO-ENTMCNC: 31.6 GM/DL (ref 32.2–35.5)
MCV RBC AUTO: 92.4 FL (ref 80–94)
MONOCYTES # BLD: 2.7 %
MONOCYTES ABSOLUTE: 0.3 THOU/MM3 (ref 0.4–1.3)
MRSA SCREEN: NORMAL
NUCLEATED RED BLOOD CELLS: 0 /100 WBC
PLATELET # BLD: 192 THOU/MM3 (ref 130–400)
PMV BLD AUTO: 11.9 FL (ref 9.4–12.4)
POTASSIUM SERPL-SCNC: 5.2 MEQ/L (ref 3.5–5.2)
PROCALCITONIN: 0.17 NG/ML (ref 0.01–0.09)
RBC # BLD: 4.48 MILL/MM3 (ref 4.7–6.1)
SEG NEUTROPHILS: 91 %
SEGMENTED NEUTROPHILS ABSOLUTE COUNT: 8.9 THOU/MM3 (ref 1.8–7.7)
SODIUM BLD-SCNC: 132 MEQ/L (ref 135–145)
URINE CULTURE, ROUTINE: NORMAL
WBC # BLD: 9.8 THOU/MM3 (ref 4.8–10.8)

## 2020-09-08 PROCEDURE — 97530 THERAPEUTIC ACTIVITIES: CPT

## 2020-09-08 PROCEDURE — 6360000002 HC RX W HCPCS: Performed by: INTERNAL MEDICINE

## 2020-09-08 PROCEDURE — 80048 BASIC METABOLIC PNL TOTAL CA: CPT

## 2020-09-08 PROCEDURE — 6370000000 HC RX 637 (ALT 250 FOR IP): Performed by: INTERNAL MEDICINE

## 2020-09-08 PROCEDURE — 2700000000 HC OXYGEN THERAPY PER DAY

## 2020-09-08 PROCEDURE — 94761 N-INVAS EAR/PLS OXIMETRY MLT: CPT

## 2020-09-08 PROCEDURE — 82948 REAGENT STRIP/BLOOD GLUCOSE: CPT

## 2020-09-08 PROCEDURE — 2100000000 HC CCU R&B

## 2020-09-08 PROCEDURE — 99223 1ST HOSP IP/OBS HIGH 75: CPT | Performed by: INTERNAL MEDICINE

## 2020-09-08 PROCEDURE — 84145 PROCALCITONIN (PCT): CPT

## 2020-09-08 PROCEDURE — 97163 PT EVAL HIGH COMPLEX 45 MIN: CPT

## 2020-09-08 PROCEDURE — 36415 COLL VENOUS BLD VENIPUNCTURE: CPT

## 2020-09-08 PROCEDURE — 86141 C-REACTIVE PROTEIN HS: CPT

## 2020-09-08 PROCEDURE — 2580000003 HC RX 258: Performed by: INTERNAL MEDICINE

## 2020-09-08 PROCEDURE — 94660 CPAP INITIATION&MGMT: CPT

## 2020-09-08 PROCEDURE — 83605 ASSAY OF LACTIC ACID: CPT

## 2020-09-08 PROCEDURE — 85025 COMPLETE CBC W/AUTO DIFF WBC: CPT

## 2020-09-08 PROCEDURE — 97110 THERAPEUTIC EXERCISES: CPT

## 2020-09-08 PROCEDURE — 6370000000 HC RX 637 (ALT 250 FOR IP): Performed by: STUDENT IN AN ORGANIZED HEALTH CARE EDUCATION/TRAINING PROGRAM

## 2020-09-08 PROCEDURE — 97166 OT EVAL MOD COMPLEX 45 MIN: CPT

## 2020-09-08 PROCEDURE — 83735 ASSAY OF MAGNESIUM: CPT

## 2020-09-08 RX ORDER — INSULIN GLARGINE 100 [IU]/ML
30 INJECTION, SOLUTION SUBCUTANEOUS 2 TIMES DAILY
Status: DISCONTINUED | OUTPATIENT
Start: 2020-09-08 | End: 2020-09-10

## 2020-09-08 RX ORDER — DANAZOL 200 MG/1
400 CAPSULE ORAL 2 TIMES DAILY
Status: DISCONTINUED | OUTPATIENT
Start: 2020-09-08 | End: 2020-09-16 | Stop reason: HOSPADM

## 2020-09-08 RX ADMIN — METHYLPREDNISOLONE SODIUM SUCCINATE 40 MG: 40 INJECTION, POWDER, FOR SOLUTION INTRAMUSCULAR; INTRAVENOUS at 09:17

## 2020-09-08 RX ADMIN — FOLIC ACID 1 MG: 1 TABLET ORAL at 09:16

## 2020-09-08 RX ADMIN — ARIPIPRAZOLE 15 MG: 15 TABLET ORAL at 09:23

## 2020-09-08 RX ADMIN — ATORVASTATIN CALCIUM 40 MG: 40 TABLET, FILM COATED ORAL at 20:35

## 2020-09-08 RX ADMIN — BUSPIRONE HYDROCHLORIDE 10 MG: 10 TABLET ORAL at 20:35

## 2020-09-08 RX ADMIN — DANAZOL 400 MG: 200 CAPSULE ORAL at 10:01

## 2020-09-08 RX ADMIN — Medication 10 ML: at 09:23

## 2020-09-08 RX ADMIN — DILTIAZEM HYDROCHLORIDE 60 MG: 60 TABLET, FILM COATED ORAL at 09:17

## 2020-09-08 RX ADMIN — ALLOPURINOL 500 MG: 300 TABLET ORAL at 09:16

## 2020-09-08 RX ADMIN — INSULIN GLARGINE 30 UNITS: 100 INJECTION, SOLUTION SUBCUTANEOUS at 20:35

## 2020-09-08 RX ADMIN — DILTIAZEM HYDROCHLORIDE 60 MG: 60 TABLET, FILM COATED ORAL at 20:35

## 2020-09-08 RX ADMIN — Medication 10 ML: at 20:35

## 2020-09-08 RX ADMIN — DANAZOL 400 MG: 200 CAPSULE ORAL at 20:35

## 2020-09-08 RX ADMIN — ENOXAPARIN SODIUM 135 MG: 150 INJECTION SUBCUTANEOUS at 20:35

## 2020-09-08 RX ADMIN — HYDRALAZINE HYDROCHLORIDE 50 MG: 50 TABLET, FILM COATED ORAL at 09:17

## 2020-09-08 RX ADMIN — HYDRALAZINE HYDROCHLORIDE 50 MG: 50 TABLET, FILM COATED ORAL at 20:36

## 2020-09-08 RX ADMIN — AMLODIPINE BESYLATE 5 MG: 5 TABLET ORAL at 09:17

## 2020-09-08 RX ADMIN — BUSPIRONE HYDROCHLORIDE 10 MG: 10 TABLET ORAL at 09:17

## 2020-09-08 RX ADMIN — CEFEPIME HYDROCHLORIDE 2 G: 2 INJECTION, POWDER, FOR SOLUTION INTRAVENOUS at 15:33

## 2020-09-08 RX ADMIN — INSULIN LISPRO 12 UNITS: 100 INJECTION, SOLUTION INTRAVENOUS; SUBCUTANEOUS at 17:39

## 2020-09-08 RX ADMIN — INSULIN LISPRO 6 UNITS: 100 INJECTION, SOLUTION INTRAVENOUS; SUBCUTANEOUS at 12:32

## 2020-09-08 RX ADMIN — INSULIN LISPRO 10 UNITS: 100 INJECTION, SOLUTION INTRAVENOUS; SUBCUTANEOUS at 07:58

## 2020-09-08 RX ADMIN — TAMSULOSIN HYDROCHLORIDE 0.4 MG: 0.4 CAPSULE ORAL at 20:36

## 2020-09-08 RX ADMIN — GABAPENTIN 800 MG: 400 CAPSULE ORAL at 09:16

## 2020-09-08 RX ADMIN — FUROSEMIDE 40 MG: 40 TABLET ORAL at 09:16

## 2020-09-08 RX ADMIN — Medication 10 ML: at 15:33

## 2020-09-08 RX ADMIN — CITALOPRAM 30 MG: 20 TABLET, FILM COATED ORAL at 09:16

## 2020-09-08 RX ADMIN — GABAPENTIN 800 MG: 400 CAPSULE ORAL at 20:35

## 2020-09-08 RX ADMIN — CEFEPIME HYDROCHLORIDE 2 G: 2 INJECTION, POWDER, FOR SOLUTION INTRAVENOUS at 04:16

## 2020-09-08 RX ADMIN — ENOXAPARIN SODIUM 135 MG: 150 INJECTION SUBCUTANEOUS at 09:18

## 2020-09-08 RX ADMIN — PANTOPRAZOLE SODIUM 40 MG: 40 TABLET, DELAYED RELEASE ORAL at 09:17

## 2020-09-08 ASSESSMENT — PAIN SCALES - GENERAL
PAINLEVEL_OUTOF10: 0

## 2020-09-08 NOTE — PLAN OF CARE
Problem: Falls - Risk of:  Goal: Will remain free from falls  Outcome: Met This Shift  Note: Patient up to the chair with standby assist.  Dianna Matos own weight well, but has endurance problem. Problem: Gas Exchange - Impaired  Goal: Absence of hypoxia  Outcome: Ongoing  Note: Patient's pulse ox drops with activity. O2 remains at 60%, unable to wean down at this time. Problem: Body Temperature -  Risk of, Imbalanced  Goal: Ability to maintain a body temperature within defined limits  Outcome: Met This Shift  Note: Patient has remained afebrile. Problem: Isolation Precautions - Risk of Spread of Infection  Goal: Prevent transmission of infection  Outcome: Met This Shift  Note: Patient remains in droplet isolation. Problem: Nutrition Deficits  Goal: Optimize nutrtional status  Outcome: Met This Shift  Note: Appetite good. Problem: Loneliness or Risk for Loneliness  Goal: Demonstrate positive use of time alone when socialization is not possible  Outcome: Ongoing  Note: Watches TV most of the time. Has also been talking on the phone. Patient participating in plan of care and goal setting.

## 2020-09-08 NOTE — PROGRESS NOTES
care and goals. Treatment time included skilled education and facilitation of tasks to increase safety and independence with ADL's for improved functional independence and quality of life. Discharge Recommendations:  Continue to assess pending progress    Patient Education:  OT Education: OT Role, Plan of Care, Home Exercise Program, ADL Adaptive Strategies, Transfer Training    Equipment Recommendations: Other: continue to assess needs    Plan:  Times per week: 5x  Current Treatment Recommendations: Strengthening, Balance Training, Functional Mobility Training, Endurance Training, Self-Care / ADL, Safety Education & Training, Patient/Caregiver Education & Training. See long-term goal time frame for expected duration of plan of care. If no long-term goals established, a short length of stay is anticipated. Goals:     Short term goals  Time Frame for Short term goals: Upon Discharge  Short term goal 1: Pt will complete functional mobility progressing to/from BR with SBA to increase indep with accessing evironment for self care. Short term goal 2: Pt will complete dynamic standing task x 4 min with B hand release and SBA to increase endurance required for grooming. Short term goal 3: Pt will complete LB ADL with Natalee to increase indep with self care. Following session, patient left in safe position with all fall risk precautions in place.

## 2020-09-08 NOTE — CARE COORDINATION
9/8/20, 11:44 AM EDT  Discharge Planning Evaluation  Social work consult received, patient from [facility]. Patient/Family preference is to return to [facility]. The patient's current payor source at the facility is Medicaid. Medicare skilled days available: No Medicare  Insurance precert:  YES  Spoke with Jeaneth KUMARI at the facility. Patient bed hold: YES, Medicaid bed hold. Anticipated transport plan: pt states his sister in law will transport back to [facility]. Do they require COVID 19 test to return to [facility]: No, pt + Covid pta. Is there a required time frame which which COVID test needs done: N/A    SW spoke with pt, states he has been at [facility] for one year for different reasons, states he was getting ready to move in to his own apartment at the [email or web address] next week. Pt states [employer] has been helping decorate his apartment and they have paid the last 2 months rent which includes all of September. Pt states [facility] contacted [facility] for follow up once pt leaves [facility]. SW spoke with therapy, states pt did well with high flow on and would like to ambulate pt again without the high flow to see how much he improves. Therapy feels if pt continues to improve he may be able to move into his new apartment when discharged.

## 2020-09-08 NOTE — PROGRESS NOTES
Present to pt room for skin check of bilateral buttocks and heels. Pt heels inspected. No open areas or redness, blanches bilaterally. Pt stands up for assessment of bilateral buttocks. Pt has sacral foam dressing in place. Inspected skin under foam. Skin dry and intact, no redness or open areas noted. Photo below. Pt sits in chair with waffle cushion, nurse at side. Recommend to continue sacral foam bordered dressing. Bilateral buttocks, no redness, open areas. Darker Pigmentation blanches.

## 2020-09-08 NOTE — PROGRESS NOTES
[address] [place] [address]  INPATIENT PHYSICAL THERAPY  EVALUATION  [facility] CVICU 4B - 4B-05/005-A    Time In: 2631  Time Out: 2691  Timed Code Treatment Minutes: 9 Minutes  Minutes: 25          Date: 2020  Patient Name: Adeline Russo,  Gender:  male        MRN: [id number]  : 1970  (44 y.o.)      Referring Practitioner: Dr. Cecilia Brown  Diagnosis: COVID-19 virus infection  Additional Pertinent Hx: admit with above diagnosis, pneumonia, KIARA     Restrictions/Precautions:  Restrictions/Precautions: General Precautions, Fall Risk  Position Activity Restriction  Other position/activity restrictions: high flow O2    Subjective:  Chart Reviewed: Yes  Patient assessed for rehabilitation services?: Yes  Subjective: pleasant and cooperative, per pt scheduled to by discharged from [facility] on  to an apt-has been at [facility] for around 1 year due to gout and RA    General:       Vision: Within Functional Limits    Hearing: Within functional limits         Pain: no pain    Social/Functional History:    Lives With: Alone  Type of Home: Facility  Home Layout: One level  Home Equipment: (no AD PTA)     ADL Assistance: Independent     Homemaking Responsibilities: No  Ambulation Assistance: Independent  Transfer Assistance: Independent    Additional Comments: was planning to discharge from ECF soon to apt which has a step to enter then an elevator pt can use. Pt has been at [facility] x 1 year.     OBJECTIVE:  Range of Motion:  Bilateral Lower Extremity: WFL    Strength:  Bilateral Lower Extremity: WFL, generalized weakness    Balance:  Static Sitting Balance:  Stand By Assistance  Static Standing Balance: Contact Guard Assistance    Bed Mobility:  Rolling to Right: Stand By Assistance   Supine to Sit: Contact Guard Assistance  Scooting: Stand By Assistance  HOB Up 10 degrees and use BR  Transfers:  Sit to Stand: Minimal Assistance  Stand to Sit:Contact [employer] Assistance    Ambulation:  Contact Guard Assistance  Distance:

## 2020-09-08 NOTE — PROGRESS NOTES
CRITICAL CARE PROGRESS NOTE      Patient:  Nasir Falling    Unit/Bed:4B-05/005-A  YOB: 1970  MRN: [id number]   PCP: Lisandro Mccoy DO  Date of Admission: 9/6/2020  Chief Complaint:-COVID-19    Assessment and Plan:    1. Acute hypoxic respiratory failure: Secondary to COVID -19 infection. Patient currently on high flow oxygen. Concern for possible sequelae of COVID-19 pneumonialike organizing pneumonia versus active pneumonia or superimposed bacterial pneumonia. On arrival pro-Luciano was 0.37, 9/8/20 0.17 continues to trend down. Continue duonebs every 4 hours as needed for shortness of breath  2. COVID-19 pneumonia/infection: Patient was diagnosed over a month ago and treated with Decadron, 3-day course of remdesivir, and azithromycin. Patient is currently requiring high flow oxygen, will continue to monitor his oxygen saturation. Inflammatory markers are elevated, pro calcitonin remains low . On arrival pro-Luciano was 0.37, 9/7/20 0. 28. Strep pneumonia urine antigen negative, Legionella antigen negative, blood cultures negative x2 preliminary, VRE screen by PCR positive. Continue patient on Remdesivir. Start danazol 400 mg BID, cefepime 2 g to 12 hours, Solu-Medrol 40 mg daily, Percocet 5-325 mg every 4 hours for moderate to severe pain  3. Atrial fibrillation: Rate controlled - Cardizem 60mg  BID. Lovenox 135 mg subcutaneous BID  4. Obstructive sleep apnea: Patient reports wearing CPAP however he is unaware of the settings at home. Patient placed on BiPAP at night and as needed throughout the day  5. Uncontrolled diabetes mellitus type 2 with hyperglycemia: Uncontrolled likely due to steroid use. Glucose continues to increase. Start Lantus 30 units BID, continue ISS, gabapentin 800 mg twice daily. 6. Chronic diastolic congestive heart failure: Preserved ejection fraction-last echo was completed 10/10/2019 reports: Normal left ventricle size and systolic function.  Ejection fraction obstructive sleep apnea uses CPAP at night, nonalcoholic steatohepatitis, morbid obesity, major depression, hypogonadism, essential hypertension, hyperlipidemia, history of osteomyelitis, history of alcohol abuse, heart failure with preserved ejection fraction, GERD, type 2 diabetes, chronic gout, CAD, BPH, atrial fibrillation, rheumatoid arthritis  Family History: Mother-heart disease with history of MI at 48years of age, . Paternal aunt -lung cancer  Social History: History of alcohol abuse, lifetime non-smoker, denies substance abuse, , resides at [place] [address]   Patient admits to pain with deep inhalation and cough. Patient states pain is midsternal has a burning-like characteristic  All other systems reviewed and negative    Scheduled Meds:   danazol  400 mg Oral BID    enoxaparin  1 mg/kg Subcutaneous BID    insulin glargine  30 Units Subcutaneous Nightly    allopurinol  500 mg Oral Daily    amLODIPine  5 mg Oral Daily    ARIPiprazole  15 mg Oral Daily    atorvastatin  40 mg Oral Nightly    busPIRone  10 mg Oral BID    citalopram  30 mg Oral Daily    dilTIAZem  60 mg Oral BID    folic acid  1 mg Oral Daily    furosemide  40 mg Oral Daily    gabapentin  800 mg Oral BID    hydrALAZINE  50 mg Oral BID    pantoprazole  40 mg Oral Daily    tamsulosin  0.4 mg Oral Nightly    sodium chloride flush  10 mL Intravenous 2 times per day    insulin lispro  0-12 Units Subcutaneous TID WC    insulin lispro  0-6 Units Subcutaneous Nightly    cefepime  2 g Intravenous Q12H    methylPREDNISolone  40 mg Intravenous Daily    polyethylene glycol  17 g Oral Every Other Day     Continuous Infusions:   dextrose         PHYSICAL EXAMINATION:  T: 97.9. P: 71. RR: 22. B/P: 132/89. FiO2: 60. O2 Sat: 92.  I/O: 302.3/975  Body mass index is 45.22 kg/m².    GCS:   15  General:   Male sitting up in bed watching TV, no acute distress at this time, requiring high flow oxygen  HEENT:  normocephalic and atraumatic. No scleral icterus. PERR  Neck: supple. No Thyromegaly. Lungs: Patient is on high flow with FiO2 of 60%. No retractions. No audible wheezing  Cardiac: RRR. No JVD. Abdomen: soft. Nontender. Extremities:  No clubbing, cyanosis, or edema x 4. Vasculature: capillary refill < 3 seconds. Palpable dorsalis pedis pulses. Skin:  warm and dry. Psych:  Alert and oriented x3. Affect appropriate  Lymph:  No supraclavicular adenopathy. Neurologic:  No focal deficit. No seizures. Data: (All radiographs, tracings, PFTs, and imaging are personally viewed and interpreted unless otherwise noted).  Sodium 132, potassium 5.2, chloride 94, CO2 28, BUN 28, creatinine 1.1, lactic acid 1.8, magnesium 2.0, calcium 8.9, pro-Luciano 0.17   WBC 9.8, Hgb 13.1, hematocrit 41.4, platelets 214   Telemetry shows Normal sinus rhythm   CXR 9/6/2020 reports: Soft infiltration of lungs. Relative severe mixed infiltrate scattered throughout both lungs consistent with history of COVID-19 infection. no effusion   Chest CT without contrast 9/6/2020 reports: Extensive patchy infiltrates in consolidation throughout both lung fields which are most consolidated within bilateral perihilar region. There is no pleural effusion. Meets Continued ICU Level Care Criteria:    [x] Yes   [] No - Transfer Planned to listed location:  [] HOSPITALIST CONTACTED-      Case and plan discussed with Dr. Elisha Petit. Electronically signed by Neil Johnson DO  CRITICAL CARE SPECIALIST  Patient seen by me. Case discussed with resident physician. Patient is morbidly obese and obviously has sleep apnea. Has some lower extremity edema bilaterally. Still requiring high flow oxygen. I discussed with the patient COVID-19 physiology. I explained concept of bradykinin storm. Initiate danazol to impact bradykinin storm. Electronically signed by Mila Petit MD.

## 2020-09-08 NOTE — PLAN OF CARE
Problem: Falls - Risk of:  Goal: Will remain free from falls  Description: Will remain free from falls  9/8/2020 0129 by Kanwal Decker RN  Outcome: Ongoing  Note: Patient free from falls this shift with call light within reach, slipper socks in place, and bed alarm on. Problem: Falls - Risk of:  Goal: Absence of physical injury  Description: Absence of physical injury  9/8/2020 0129 by Kanwal Decker RN  Outcome: Ongoing  Note: Patient free from falls this shift with call light within reach, slipper socks in place, and bed alarm on. Problem: Airway Clearance - Ineffective  Goal: Achieve or maintain patent airway  9/8/2020 0129 by Kanwal Decker RN  Outcome: Ongoing  Note: Patient maintaining stable airway this shift with help of high flow oxygen. Problem: Gas Exchange - Impaired  Goal: Absence of hypoxia  9/8/2020 0129 by Kanwal Decker RN  Outcome: Ongoing  9/8/2020 0038 by Kanwal Decker RN  Outcome: Ongoing  Note: Patient remains on high flow and maintaining oxygen saturation >90%. Problem: Gas Exchange - Impaired  Goal: Promote optimal lung function  9/8/2020 0038 by Kanwal Decker RN  Outcome: Ongoing  Note: Patient remains on high flow and maintaining oxygen saturation >90%. Problem: Loneliness or Risk for Loneliness  Goal: Demonstrate positive use of time alone when socialization is not possible  9/8/2020 0129 by Kanwal Decker RN  Outcome: Ongoing     Problem: Skin Integrity:  Goal: Will show no infection signs and symptoms  Description: Will show no infection signs and symptoms  9/8/2020 0129 by Kanwal Decker RN  Outcome: Ongoing   Care plan reviewed with patient. Patient verbalizes understanding of the plan of care and contribute to goal setting.

## 2020-09-08 NOTE — CARE COORDINATION
Nightly    sodium chloride flush  10 mL Intravenous 2 times per day    insulin lispro  0-12 Units Subcutaneous TID WC    insulin lispro  0-6 Units Subcutaneous Nightly    cefepime  2 g Intravenous Q12H    methylPREDNISolone  40 mg Intravenous Daily    polyethylene glycol  17 g Oral Every Other Day     Continuous Infusions:   dextrose        Pertinent Info/Orders/Treatment Plan: Presented after being found hypoxic at the nursing home. Arrived on NRB mask, was taken off O2 for a short period of time in ED and sats dropped to 81% and was placed back on NRB mask. ECF also reported some diarrhea and temp 100.3. Patient had been +COVID 19 last month, treated at [facility], and discharge on 8/12. Repeat COVID 19 test on 9/6 was positive. Afebrile. NSR. On vapotherm 60% FIO2, 60L, with sats 92%. Ox4. Follows commands. PT/OT. +COVID 19. +MRSA nares. +VRE rectum. Telemetry, I&O, daily weight, SCDs, mason care. Allopurinol, norvasc, abilify, lipitor, buspar, IV cefepime, celexa, danazol, cardizem, lovenox bid, folic acid, po lasix daily, hydralazine, lantus, SSI ACHS, IV solumedrol 40 mg daily, protonix, flomax. Procal 0.37 - then 0.28, CRP 9, , trop neg, alb 3.1, Hgb A1C 9.9, wbc 11.6 - then 10.5, hgb 13.3 - then 12.8. Blood and urine cultures sent. Diet: DIET CARB CONTROL;   Smoking status:  reports that he has never smoked.  He has never used smokeless tobacco.   PCP: Moisés Huerta DO  Readmission 30 days or less: no  Readmission Risk Score: 31%    Discharge Planning Evaluation  Current Residence:  Nursing Home  Living Arrangements:  Other (Comment)(ECF resident)   Support Systems:  ECF/Assisted Living, Friends/Neighbors  Current Services PTA:     Potential Assistance Needed:  Skilled Nursing Facility  Potential Assistance Purchasing Medications:  No  Does patient want to participate in local refill/ meds to beds program?  No  Type of Home Care Services:  None  Patient expects to be [facility]  Expected Discharge date:  09/09/20  Follow Up Appointment: Best Day/ Time: Monday AM    Patient Goals/Plan/Treatment Preferences: From [facility]. Plan return to [facility] vs if able, move into his new apartment with [facility]. SW on case. Transportation/Food Security/Housekeeping Addressed:  No issues identified.

## 2020-09-09 ENCOUNTER — APPOINTMENT (OUTPATIENT)
Dept: GENERAL RADIOLOGY | Age: 50
DRG: 177 | End: 2020-09-09
Payer: MEDICARE

## 2020-09-09 LAB
ALBUMIN SERPL-MCNC: 3.3 G/DL (ref 3.5–5.1)
ALP BLD-CCNC: 78 U/L (ref 38–126)
ALT SERPL-CCNC: 46 U/L (ref 11–66)
ANION GAP SERPL CALCULATED.3IONS-SCNC: 9 MEQ/L (ref 8–16)
AST SERPL-CCNC: 33 U/L (ref 5–40)
BASOPHILS # BLD: 0.3 %
BASOPHILS ABSOLUTE: 0 THOU/MM3 (ref 0–0.1)
BILIRUB SERPL-MCNC: 0.3 MG/DL (ref 0.3–1.2)
BUN BLDV-MCNC: 22 MG/DL (ref 7–22)
CALCIUM SERPL-MCNC: 9 MG/DL (ref 8.5–10.5)
CHLORIDE BLD-SCNC: 99 MEQ/L (ref 98–111)
CO2: 32 MEQ/L (ref 23–33)
CREAT SERPL-MCNC: 0.9 MG/DL (ref 0.4–1.2)
EOSINOPHIL # BLD: 0.2 %
EOSINOPHILS ABSOLUTE: 0 THOU/MM3 (ref 0–0.4)
ERYTHROCYTE [DISTWIDTH] IN BLOOD BY AUTOMATED COUNT: 15.9 % (ref 11.5–14.5)
ERYTHROCYTE [DISTWIDTH] IN BLOOD BY AUTOMATED COUNT: 53 FL (ref 35–45)
GFR SERPL CREATININE-BSD FRML MDRD: > 90 ML/MIN/1.73M2
GLUCOSE BLD-MCNC: 208 MG/DL (ref 70–108)
GLUCOSE BLD-MCNC: 321 MG/DL (ref 70–108)
GLUCOSE BLD-MCNC: 325 MG/DL (ref 70–108)
GLUCOSE BLD-MCNC: 433 MG/DL (ref 70–108)
GLUCOSE BLD-MCNC: 463 MG/DL (ref 70–108)
HCT VFR BLD CALC: 43.3 % (ref 42–52)
HEMOGLOBIN: 13.7 GM/DL (ref 14–18)
IMMATURE GRANS (ABS): 0.25 THOU/MM3 (ref 0–0.07)
IMMATURE GRANULOCYTES: 2.5 %
LYMPHOCYTES # BLD: 9 %
LYMPHOCYTES ABSOLUTE: 0.9 THOU/MM3 (ref 1–4.8)
MAGNESIUM: 2.1 MG/DL (ref 1.6–2.4)
MCH RBC QN AUTO: 29.1 PG (ref 26–33)
MCHC RBC AUTO-ENTMCNC: 31.6 GM/DL (ref 32.2–35.5)
MCV RBC AUTO: 91.9 FL (ref 80–94)
MONOCYTES # BLD: 6.5 %
MONOCYTES ABSOLUTE: 0.7 THOU/MM3 (ref 0.4–1.3)
NUCLEATED RED BLOOD CELLS: 0 /100 WBC
PLATELET # BLD: 208 THOU/MM3 (ref 130–400)
PMV BLD AUTO: 11.6 FL (ref 9.4–12.4)
POTASSIUM SERPL-SCNC: 4.5 MEQ/L (ref 3.5–5.2)
PROCALCITONIN: 0.18 NG/ML (ref 0.01–0.09)
RBC # BLD: 4.71 MILL/MM3 (ref 4.7–6.1)
SEG NEUTROPHILS: 81.5 %
SEGMENTED NEUTROPHILS ABSOLUTE COUNT: 8.2 THOU/MM3 (ref 1.8–7.7)
SODIUM BLD-SCNC: 140 MEQ/L (ref 135–145)
TOTAL PROTEIN: 6.4 G/DL (ref 6.1–8)
VITAMIN D 25-HYDROXY: 40 NG/ML (ref 30–100)
WBC # BLD: 10 THOU/MM3 (ref 4.8–10.8)

## 2020-09-09 PROCEDURE — 2580000003 HC RX 258: Performed by: INTERNAL MEDICINE

## 2020-09-09 PROCEDURE — 71045 X-RAY EXAM CHEST 1 VIEW: CPT

## 2020-09-09 PROCEDURE — 6360000002 HC RX W HCPCS: Performed by: INTERNAL MEDICINE

## 2020-09-09 PROCEDURE — 36415 COLL VENOUS BLD VENIPUNCTURE: CPT

## 2020-09-09 PROCEDURE — 6370000000 HC RX 637 (ALT 250 FOR IP): Performed by: INTERNAL MEDICINE

## 2020-09-09 PROCEDURE — 94761 N-INVAS EAR/PLS OXIMETRY MLT: CPT

## 2020-09-09 PROCEDURE — 80053 COMPREHEN METABOLIC PANEL: CPT

## 2020-09-09 PROCEDURE — 6370000000 HC RX 637 (ALT 250 FOR IP): Performed by: STUDENT IN AN ORGANIZED HEALTH CARE EDUCATION/TRAINING PROGRAM

## 2020-09-09 PROCEDURE — 97530 THERAPEUTIC ACTIVITIES: CPT

## 2020-09-09 PROCEDURE — 2100000000 HC CCU R&B

## 2020-09-09 PROCEDURE — 82306 VITAMIN D 25 HYDROXY: CPT

## 2020-09-09 PROCEDURE — 85025 COMPLETE CBC W/AUTO DIFF WBC: CPT

## 2020-09-09 PROCEDURE — 97110 THERAPEUTIC EXERCISES: CPT

## 2020-09-09 PROCEDURE — 84145 PROCALCITONIN (PCT): CPT

## 2020-09-09 PROCEDURE — 83735 ASSAY OF MAGNESIUM: CPT

## 2020-09-09 PROCEDURE — 82948 REAGENT STRIP/BLOOD GLUCOSE: CPT

## 2020-09-09 PROCEDURE — 2700000000 HC OXYGEN THERAPY PER DAY

## 2020-09-09 PROCEDURE — 99233 SBSQ HOSP IP/OBS HIGH 50: CPT | Performed by: INTERNAL MEDICINE

## 2020-09-09 RX ORDER — DOCUSATE SODIUM 100 MG/1
100 CAPSULE, LIQUID FILLED ORAL 2 TIMES DAILY
Status: DISCONTINUED | OUTPATIENT
Start: 2020-09-09 | End: 2020-09-16 | Stop reason: HOSPADM

## 2020-09-09 RX ORDER — VITAMIN B COMPLEX
1000 TABLET ORAL DAILY
Status: DISCONTINUED | OUTPATIENT
Start: 2020-09-09 | End: 2020-09-16 | Stop reason: HOSPADM

## 2020-09-09 RX ADMIN — BUSPIRONE HYDROCHLORIDE 10 MG: 10 TABLET ORAL at 21:03

## 2020-09-09 RX ADMIN — DILTIAZEM HYDROCHLORIDE 60 MG: 60 TABLET, FILM COATED ORAL at 09:05

## 2020-09-09 RX ADMIN — INSULIN GLARGINE 30 UNITS: 100 INJECTION, SOLUTION SUBCUTANEOUS at 21:03

## 2020-09-09 RX ADMIN — ENOXAPARIN SODIUM 135 MG: 150 INJECTION SUBCUTANEOUS at 09:06

## 2020-09-09 RX ADMIN — Medication 1000 UNITS: at 21:03

## 2020-09-09 RX ADMIN — HYDRALAZINE HYDROCHLORIDE 50 MG: 50 TABLET, FILM COATED ORAL at 09:06

## 2020-09-09 RX ADMIN — ARIPIPRAZOLE 15 MG: 15 TABLET ORAL at 09:05

## 2020-09-09 RX ADMIN — INSULIN LISPRO 12 UNITS: 100 INJECTION, SOLUTION INTRAVENOUS; SUBCUTANEOUS at 17:17

## 2020-09-09 RX ADMIN — ENOXAPARIN SODIUM 135 MG: 150 INJECTION SUBCUTANEOUS at 21:02

## 2020-09-09 RX ADMIN — BUSPIRONE HYDROCHLORIDE 10 MG: 10 TABLET ORAL at 09:05

## 2020-09-09 RX ADMIN — AMLODIPINE BESYLATE 5 MG: 5 TABLET ORAL at 09:06

## 2020-09-09 RX ADMIN — GABAPENTIN 800 MG: 400 CAPSULE ORAL at 21:02

## 2020-09-09 RX ADMIN — ALLOPURINOL 500 MG: 300 TABLET ORAL at 09:06

## 2020-09-09 RX ADMIN — INSULIN LISPRO 8 UNITS: 100 INJECTION, SOLUTION INTRAVENOUS; SUBCUTANEOUS at 11:54

## 2020-09-09 RX ADMIN — INSULIN LISPRO 4 UNITS: 100 INJECTION, SOLUTION INTRAVENOUS; SUBCUTANEOUS at 09:14

## 2020-09-09 RX ADMIN — FUROSEMIDE 40 MG: 40 TABLET ORAL at 09:05

## 2020-09-09 RX ADMIN — Medication 10 ML: at 09:12

## 2020-09-09 RX ADMIN — DANAZOL 400 MG: 200 CAPSULE ORAL at 09:05

## 2020-09-09 RX ADMIN — PANTOPRAZOLE SODIUM 40 MG: 40 TABLET, DELAYED RELEASE ORAL at 09:08

## 2020-09-09 RX ADMIN — DOCUSATE SODIUM 100 MG: 100 CAPSULE, LIQUID FILLED ORAL at 09:06

## 2020-09-09 RX ADMIN — HYDRALAZINE HYDROCHLORIDE 50 MG: 50 TABLET, FILM COATED ORAL at 21:02

## 2020-09-09 RX ADMIN — DANAZOL 400 MG: 200 CAPSULE ORAL at 21:03

## 2020-09-09 RX ADMIN — ATORVASTATIN CALCIUM 40 MG: 40 TABLET, FILM COATED ORAL at 21:03

## 2020-09-09 RX ADMIN — Medication 10 ML: at 21:03

## 2020-09-09 RX ADMIN — POLYETHYLENE GLYCOL 3350 17 G: 17 POWDER, FOR SOLUTION ORAL at 09:07

## 2020-09-09 RX ADMIN — FOLIC ACID 1 MG: 1 TABLET ORAL at 09:05

## 2020-09-09 RX ADMIN — GABAPENTIN 800 MG: 400 CAPSULE ORAL at 09:06

## 2020-09-09 RX ADMIN — TAMSULOSIN HYDROCHLORIDE 0.4 MG: 0.4 CAPSULE ORAL at 21:02

## 2020-09-09 RX ADMIN — CITALOPRAM 30 MG: 20 TABLET, FILM COATED ORAL at 09:05

## 2020-09-09 RX ADMIN — CEFEPIME HYDROCHLORIDE 2 G: 2 INJECTION, POWDER, FOR SOLUTION INTRAVENOUS at 04:46

## 2020-09-09 RX ADMIN — DILTIAZEM HYDROCHLORIDE 60 MG: 60 TABLET, FILM COATED ORAL at 21:02

## 2020-09-09 RX ADMIN — CEFEPIME HYDROCHLORIDE 2 G: 2 INJECTION, POWDER, FOR SOLUTION INTRAVENOUS at 17:19

## 2020-09-09 RX ADMIN — METHYLPREDNISOLONE SODIUM SUCCINATE 40 MG: 40 INJECTION, POWDER, FOR SOLUTION INTRAMUSCULAR; INTRAVENOUS at 09:08

## 2020-09-09 RX ADMIN — INSULIN GLARGINE 30 UNITS: 100 INJECTION, SOLUTION SUBCUTANEOUS at 09:22

## 2020-09-09 RX ADMIN — DOCUSATE SODIUM 100 MG: 100 CAPSULE, LIQUID FILLED ORAL at 21:03

## 2020-09-09 ASSESSMENT — PAIN SCALES - GENERAL
PAINLEVEL_OUTOF10: 0

## 2020-09-09 NOTE — PLAN OF CARE
status  9/9/2020 0943 by Dorina Mendoza, SIERRA  Outcome: Ongoing  Note: Tolerating diet well. Problem: Skin Integrity:  Goal: Will show no infection signs and symptoms  Description: Will show no infection signs and symptoms  Outcome: Ongoing  Note: No new skin issues this shift. Chair cushion while up. Problem: Skin Integrity:  Goal: Absence of new skin breakdown  Description: Absence of new skin breakdown  Outcome: Ongoing  Note: No new skin issues noted. Problem: Discharge Planning:  Goal: Discharged to appropriate level of care  Description: Discharged to appropriate level of care  Outcome: Ongoing  Note: From [facility], no discharge plans at this time. Problem: Serum Glucose Level - Abnormal:  Goal: Ability to maintain appropriate glucose levels will improve  Description: Ability to maintain appropriate glucose levels will improve  Outcome: Ongoing  Note: Chem ac/hs with sliding scale coverage. Care plan reviewed with patient. Patient verbalizes understanding of the plan of care and contribute to goal setting.

## 2020-09-09 NOTE — PLAN OF CARE
Problem: Impaired respiratory status  Goal: Able to breathe comfortably  Description: Able to breathe comfortably  9/9/2020 1348 by Yomi Dc RCP  Outcome: Ongoing

## 2020-09-09 NOTE — PROGRESS NOTES
CRITICAL CARE PROGRESS NOTE      Patient:  Danitza Peralta    Unit/Bed:4B-05/005-A  YOB: 1970  MRN: [id number]   PCP: Geeta Patton DO  Date of Admission: 9/6/2020  Chief Complaint:-COVID-19 infection    Assessment and Plan:    1. Acute hypoxic respiratory failure: Secondary to COVID -19 infection. Patient currently on high flow oxygen. Concern for possible sequelae of COVID-19 pneumonialike organizing pneumonia versus active pneumonia or superimposed bacterial pneumonia. On arrival pro-Luciano was 0.37, 9/9/20 0. 18. Continue duonebs every 4 hours as needed for shortness of breath. Patient currently on high flow 60 L/min with an FiO2 of 70. Will repeat chest x-ray for evaluation of infiltrates. 2. COVID-19 pneumonia/infection: Patient was diagnosed over a month ago and treated with Decadron, 3-day course of remdesivir, and azithromycin. Patient is currently requiring high flow oxygen, will continue to monitor his oxygen saturation. Inflammatory markers are elevated, pro calcitonin remains low . On arrival pro-Luciano was 0.37, 9/9/20 0. 18. Strep pneumonia urine antigen negative, Legionella antigen negative, blood cultures negative x2 preliminary, VRE screen by PCR positive. Continue patient on Remdesivir. Continue danazol 400 mg BID,  Solu-Medrol 40 mg daily. Will transition patient from Solu-Medrol to Decadron. 3. History of vitamin D deficiency: Vitamin D, 25 Hydroxy 40 ng/ml 9/9/20. There is evidence to support that improved with COVID19 infections vitamin D helps breakdown bradykinin and in turn reducing sequelae of COVID19. Will start vitamin D 1000 units daily supplementation   4. Atrial fibrillation: Rate controlled - Cardizem 60mg  BID. Lovenox 135 mg subcutaneous BID  5. Obstructive sleep apnea: Patient reports wearing CPAP however he is unaware of the settings at home. Patient placed on BiPAP at night and as needed throughout the day  6.  Uncontrolled diabetes mellitus type because he was found to be hypoxic at [facility]. Patient arrived to the emergency room on a nonrebreather, he was taken off oxygen for short period of time in the emergency room department and oxygen saturation dropped to 81%. At that time patient was placed back on nonrebreather. Patient had a low-grade fever of 100.3. Patient admits to diarrhea prior to coming to the emergency room department. Repeat COVID test positive.      Patient reports that after previous admission to [facility] for COVID infection 20, he returned back to [facility] and was well for about 4 to 5 days. Patient admits that his roommate at [facility] was sick when he went back patient and he started to develop productive cough with green-yellow thick sputum production. Since arrival to [facility] and treatment in the ICU patient has denied any cough or sputum production      Past Medical History: Vitamin D deficiency, obstructive sleep apnea uses CPAP at night, nonalcoholic steatohepatitis, morbid obesity, major depression, hypogonadism, essential hypertension, hyperlipidemia, history of osteomyelitis, history of alcohol abuse, heart failure with preserved ejection fraction, GERD, type 2 diabetes, chronic gout, CAD, BPH, atrial fibrillation, rheumatoid arthritis  Family History: Mother-heart disease with history of MI at 48years of age, . Paternal aunt -lung cancer  Social History: History of alcohol abuse, lifetime non-smoker, denies substance abuse, , resides at [address] [place] [address]   Patient expresses that he is still experiencing pain with deep inhalation. He states it is no different than yesterday. Patient also admits that he has not had a bowel movement in more than 4 days.   All other systems reviewed negative    Scheduled Meds:   danazol  400 mg Oral BID    insulin glargine  30 Units Subcutaneous BID    enoxaparin  1 mg/kg Subcutaneous BID    allopurinol  500 mg Oral Daily    amLODIPine  5 mg Oral Daily    ARIPiprazole  15 mg Oral Daily    atorvastatin  40 mg Oral Nightly    busPIRone  10 mg Oral BID    citalopram  30 mg Oral Daily    dilTIAZem  60 mg Oral BID    folic acid  1 mg Oral Daily    furosemide  40 mg Oral Daily    gabapentin  800 mg Oral BID    hydrALAZINE  50 mg Oral BID    pantoprazole  40 mg Oral Daily    tamsulosin  0.4 mg Oral Nightly    sodium chloride flush  10 mL Intravenous 2 times per day    insulin lispro  0-12 Units Subcutaneous TID WC    insulin lispro  0-6 Units Subcutaneous Nightly    cefepime  2 g Intravenous Q12H    methylPREDNISolone  40 mg Intravenous Daily    polyethylene glycol  17 g Oral Every Other Day     Continuous Infusions:   dextrose         PHYSICAL EXAMINATION:  T: 98.2.  P: 65. RR: 17. B/P: 113/84. FiO2: 70. O2 Sat: 90.  I/O: 350/2050  Body mass index is 44.11 kg/m². GCS:   15  General:   Acutely ill male, on high flow oxygen   HEENT:  normocephalic and atraumatic. No scleral icterus. PERR  Neck: supple. No Thyromegaly. Lungs:  No retractions No audible wheezes high flow 60 L/min FiO2 of 70%  Cardiac: RRR. No JVD. Abdomen: soft. Nontender. Extremities:  No clubbing or cyanosis. Bilateral trace pedal edema  Vasculature: capillary refill < 3 seconds. Palpable dorsalis pedis pulses. Skin:  warm and dry. Psych:  Alert and oriented x3. Affect appropriate  Lymph:  No supraclavicular adenopathy. Neurologic:  No focal deficit. No seizures. Data: (All radiographs, tracings, PFTs, and imaging are personally viewed and interpreted unless otherwise noted).  Sodium 140, potassium 4.5, chloride 99, CO2 32, BUN 22, creatinine 0.9, calcium 9.0, total protein 6.4, pro-Luciano 0.18   WBC 10.0, hemoglobin 13.7, hematocrit 43.3, platelets 939   Albumin 3.3, alk phos 78, ALT 46, AST 33, bilirubin 0.3   Vitamin D 25 hydroxy 40 ng.ml   Telemetry shows normal sinus rhythm   Chest x-ray 9/9/20 reports: Borderline heart size. No effusion.   Moderate least severe mixed infiltrate scattered throughout both lungs, consistent with pneumonia. Slightly improved since prior study   CXR 9/6/2020 reports: Soft infiltration of lungs. Relative severe mixed infiltrate scattered throughout both lungs consistent with history of COVID-19 infection. no effusion   Chest CT without contrast 9/6/2020 reports: Extensive patchy infiltrates in consolidation throughout both lung fields which are most consolidated within bilateral perihilar region. There is no pleural effusion. Meets Continued ICU Level Care Criteria:    [x] Yes   [] No - Transfer Planned to listed location:  [] HOSPITALIST CONTACTED-      Case and plan discussed with Dr. Emerson Schaumann. Electronically signed by Froy Monsalve DO  CRITICAL CARE SPECIALIST  Patient seen by me. Case discussed with resident physician. Patient remains life-threatening really ill, and remains at risk for mechanical ventilator support. Currently patient receiving antiviral therapy. Patient also receiving steroid therapy to decrease mortality risk. Initiate aspirin to decrease the risk of microthrombosis. On danazol to decrease bradykinin effect from COVID-19. Electronically signed by Woodson Dienes Emerson Schaumann MD.

## 2020-09-09 NOTE — PROGRESS NOTES
breathing education. BED MOBILITY:  Not Tested    TRANSFERS:  Sit to Stand:  Contact Guard Assistance. Stand to Sit: Contact Guard Assistance. FUNCTIONAL MOBILITY:  Assistive Device: None  Assist Level:  Contact Guard Assistance. Distance:   Completed functional mobility within pt room with distance limited d/t high flow lines at slow pace, no LOB noted. Pt requires lengthy seated rest break after trial of mobility, mod fatigue noted- O2 reading 88% after trial of mobility. ADDITIONAL ACTIVITIES:  Completed BUE exercises x10 reps x1 sets using min resistance band in all joints/planes to increase strength and endurance required for ADLs. Pt required lenghty rest break between each exercise and min v/c for proper technique- O2 sats fluctuating with activity from 79-89% with extended time to recover WNL. Pt provided skilled education for deep breathing techniuqes in static stand to promote improved endurance and increase ease with functional tasks. Pt completed with min v/c for belly breathing and demos good understanding. ASSESSMENT:     Activity Tolerance:  Patient tolerance of  treatment: fair. Discharge Recommendations: Continue to assess pending progress   Equipment Recommendations: Other: continue to assess needs  Plan: Times per week: 5x  Current Treatment Recommendations: Strengthening, Balance Training, Functional Mobility Training, Endurance Training, Self-Care / ADL, Safety Education & Training, Patient/Caregiver Education & Training    Patient Education  Patient Education: Energy Conservation, Home Exercise Program, Importance of Increasing Activity and Safety with transfers and mobility. Goals  Short term goals  Time Frame for Short term goals: Upon Discharge  Short term goal 1: Pt will complete functional mobility progressing to/from BR with SBA to increase indep with accessing evironment for self care.   Short term goal 2: Pt will complete dynamic standing task x 4 min with B hand release and SBA to increase endurance required for grooming. Short term goal 3: Pt will complete LB ADL with Natalee to increase indep with self care. Following session, patient left in safe position with all fall risk precautions in place.

## 2020-09-09 NOTE — PLAN OF CARE
Problem: Falls - Risk of:  Goal: Will remain free from falls  Description: Will remain free from falls  9/9/2020 0032 by Clay Sue RN  Outcome: Ongoing  Note: Patient in fall precautions, nonskid socks on, call light within reach, hourly checks performed, fall bracelet on, up with staff and gait belt     Problem: Airway Clearance - Ineffective  Goal: Achieve or maintain patent airway  9/9/2020 0032 by Clay Sue RN  Outcome: Ongoing     Problem: Gas Exchange - Impaired  Goal: Absence of hypoxia  9/9/2020 0032 by Clay Sue RN  Outcome: Ongoing  Note: Patient at 60L/min and 70% FIO2 and unable to be weaned down at this time, patient has bipap and high flow nasal canula, o2 sats between 90-95%, O2 drops below 90 when active     Problem: Breathing Pattern - Ineffective  Goal: Ability to achieve and maintain a regular respiratory rate  9/9/2020 0032 by Clay Sue RN  Outcome: Ongoing  Note: Patient has unlabored respirations and rate between 22-23     Problem: Body Temperature -  Risk of, Imbalanced  Goal: Ability to maintain a body temperature within defined limits  9/9/2020 0032 by Clay Sue RN  Outcome: Met This Shift  Note: Body temp within defined limits     Problem: Isolation Precautions - Risk of Spread of Infection  Goal: Prevent transmission of infection  9/9/2020 0032 by Clay Sue RN  Outcome: Ongoing  Note: Patient in droplet (covid) precautions     Problem: Nutrition Deficits  Goal: Optimize nutrtional status  9/9/2020 0032 by Clay Sue RN  Outcome: Ongoing  Note: Patient eats all of meals and maintains good appetite    Care plan reviewed with patient. Patient verbalizes understanding of the plan of care and contribute to goal setting.

## 2020-09-09 NOTE — PROGRESS NOTES
to correct, otherwise short step lengths with wide ROSA MARIA    Exercise:  Patient was guided in 1 set(s) 15 reps of exercise to both lower extremities. Ankle pumps, Glut sets, Quad sets, Heelslides, Hip abduction/adduction and Straight leg raises. Exercises were completed for increased independence with functional mobility. Functional Outcome Measures: Completed  AM-PAC Inpatient Mobility Raw Score : 18  AM-PAC Inpatient T-Scale Score : 43.63    ASSESSMENT:  Assessment: Patient progressing toward established goals. Activity Tolerance:  Patient tolerance of  treatment: good. Pt tolerated session well. Pt demos decreased strength, endurance, balance and safety with mobility. Pt will benefit from cont PT at this time,     Equipment Recommendations: Other: cont to monitor for pt needs  Discharge Recommendations:  Continue to assess pending progress, may benefit from TCU vs HH depending on progress with ambulation, pt unsteady and unsafe this date to return home     Plan: Times per week: 5X GM  Times per day: Daily  Specific instructions for Next Treatment: therex and mobility    Patient Education  Patient Education: Plan of Care, Bed Mobility, Transfers, Gait, Verbal Exercise Instruction    Goals:  Patient goals : go to apt from hospital next Monday  Short term goals  Time Frame for Short term goals: by discharge  Short term goal 1: bed mobility with MOD I to get in/out of bed  Short term goal 2: transfer with MOD I to get in/out of chairs  Short term goal 3: amb >100'x1 without AD and MOD I to walk safely in apt  Long term goals  Time Frame for Long term goals : no LTGs set secondary to short ELOS    Following session, patient left in safe position with all fall risk precautions in place.

## 2020-09-09 NOTE — PLAN OF CARE
Continue the use of bipap and hfnc to help improve respiratory status.   Problem: Impaired respiratory status  Goal: Able to breathe comfortably  Description: Able to breathe comfortably  Outcome: Ongoing

## 2020-09-10 ENCOUNTER — APPOINTMENT (OUTPATIENT)
Dept: GENERAL RADIOLOGY | Age: 50
DRG: 177 | End: 2020-09-10
Payer: MEDICARE

## 2020-09-10 LAB
ALBUMIN SERPL-MCNC: 3.2 G/DL (ref 3.5–5.1)
ALP BLD-CCNC: 73 U/L (ref 38–126)
ALT SERPL-CCNC: 57 U/L (ref 11–66)
ANION GAP SERPL CALCULATED.3IONS-SCNC: 9 MEQ/L (ref 8–16)
ANISOCYTOSIS: PRESENT
AST SERPL-CCNC: 29 U/L (ref 5–40)
BASOPHILS # BLD: 0 %
BASOPHILS ABSOLUTE: 0 THOU/MM3 (ref 0–0.1)
BILIRUB SERPL-MCNC: 0.3 MG/DL (ref 0.3–1.2)
BUN BLDV-MCNC: 20 MG/DL (ref 7–22)
C-REACTIVE PROTEIN, HIGH SENSITIVITY: 54.9 MG/L
CALCIUM SERPL-MCNC: 9.1 MG/DL (ref 8.5–10.5)
CHLORIDE BLD-SCNC: 100 MEQ/L (ref 98–111)
CO2: 32 MEQ/L (ref 23–33)
CREAT SERPL-MCNC: 0.8 MG/DL (ref 0.4–1.2)
DIFFERENTIAL, MANUAL: NORMAL
EOSINOPHIL # BLD: 0 %
EOSINOPHILS ABSOLUTE: 0 THOU/MM3 (ref 0–0.4)
ERYTHROCYTE [DISTWIDTH] IN BLOOD BY AUTOMATED COUNT: 15.3 % (ref 11.5–14.5)
ERYTHROCYTE [DISTWIDTH] IN BLOOD BY AUTOMATED COUNT: 50.4 FL (ref 35–45)
GFR SERPL CREATININE-BSD FRML MDRD: > 90 ML/MIN/1.73M2
GLUCOSE BLD-MCNC: 198 MG/DL (ref 70–108)
GLUCOSE BLD-MCNC: 202 MG/DL (ref 70–108)
GLUCOSE BLD-MCNC: 293 MG/DL (ref 70–108)
GLUCOSE BLD-MCNC: 390 MG/DL (ref 70–108)
GLUCOSE BLD-MCNC: 435 MG/DL (ref 70–108)
HCT VFR BLD CALC: 40.4 % (ref 42–52)
HEMOGLOBIN: 13.1 GM/DL (ref 14–18)
LD: 300 U/L (ref 100–190)
LYMPHOCYTES # BLD: 18 %
LYMPHOCYTES ABSOLUTE: 2 THOU/MM3 (ref 1–4.8)
MAGNESIUM: 2 MG/DL (ref 1.6–2.4)
MCH RBC QN AUTO: 29.3 PG (ref 26–33)
MCHC RBC AUTO-ENTMCNC: 32.4 GM/DL (ref 32.2–35.5)
MCV RBC AUTO: 90.4 FL (ref 80–94)
METAMYELOCYTES: 2 %
MONOCYTES # BLD: 5 %
MONOCYTES ABSOLUTE: 0.5 THOU/MM3 (ref 0.4–1.3)
MYELOCYTES: 1 %
NUCLEATED RED BLOOD CELLS: 0 /100 WBC
PLATELET # BLD: 232 THOU/MM3 (ref 130–400)
PMV BLD AUTO: 11.3 FL (ref 9.4–12.4)
POTASSIUM SERPL-SCNC: 4.1 MEQ/L (ref 3.5–5.2)
PROCALCITONIN: 0.19 NG/ML (ref 0.01–0.09)
RBC # BLD: 4.47 MILL/MM3 (ref 4.7–6.1)
SEG NEUTROPHILS: 74 %
SEGMENTED NEUTROPHILS ABSOLUTE COUNT: 8.1 THOU/MM3 (ref 1.8–7.7)
SODIUM BLD-SCNC: 141 MEQ/L (ref 135–145)
TOTAL PROTEIN: 6.1 G/DL (ref 6.1–8)
WBC # BLD: 10.9 THOU/MM3 (ref 4.8–10.8)

## 2020-09-10 PROCEDURE — 6370000000 HC RX 637 (ALT 250 FOR IP): Performed by: INTERNAL MEDICINE

## 2020-09-10 PROCEDURE — 99233 SBSQ HOSP IP/OBS HIGH 50: CPT | Performed by: INTERNAL MEDICINE

## 2020-09-10 PROCEDURE — 36415 COLL VENOUS BLD VENIPUNCTURE: CPT

## 2020-09-10 PROCEDURE — 83615 LACTATE (LD) (LDH) ENZYME: CPT

## 2020-09-10 PROCEDURE — 80053 COMPREHEN METABOLIC PANEL: CPT

## 2020-09-10 PROCEDURE — 85025 COMPLETE CBC W/AUTO DIFF WBC: CPT

## 2020-09-10 PROCEDURE — 94761 N-INVAS EAR/PLS OXIMETRY MLT: CPT

## 2020-09-10 PROCEDURE — 2100000000 HC CCU R&B

## 2020-09-10 PROCEDURE — 2580000003 HC RX 258: Performed by: INTERNAL MEDICINE

## 2020-09-10 PROCEDURE — 6360000002 HC RX W HCPCS: Performed by: INTERNAL MEDICINE

## 2020-09-10 PROCEDURE — 6370000000 HC RX 637 (ALT 250 FOR IP): Performed by: STUDENT IN AN ORGANIZED HEALTH CARE EDUCATION/TRAINING PROGRAM

## 2020-09-10 PROCEDURE — 97110 THERAPEUTIC EXERCISES: CPT

## 2020-09-10 PROCEDURE — 6360000002 HC RX W HCPCS: Performed by: STUDENT IN AN ORGANIZED HEALTH CARE EDUCATION/TRAINING PROGRAM

## 2020-09-10 PROCEDURE — 83735 ASSAY OF MAGNESIUM: CPT

## 2020-09-10 PROCEDURE — 2700000000 HC OXYGEN THERAPY PER DAY

## 2020-09-10 PROCEDURE — 84145 PROCALCITONIN (PCT): CPT

## 2020-09-10 PROCEDURE — 97116 GAIT TRAINING THERAPY: CPT

## 2020-09-10 PROCEDURE — 71045 X-RAY EXAM CHEST 1 VIEW: CPT

## 2020-09-10 PROCEDURE — 82948 REAGENT STRIP/BLOOD GLUCOSE: CPT

## 2020-09-10 RX ORDER — ASPIRIN 81 MG/1
81 TABLET, CHEWABLE ORAL DAILY
Status: DISCONTINUED | OUTPATIENT
Start: 2020-09-10 | End: 2020-09-16 | Stop reason: HOSPADM

## 2020-09-10 RX ORDER — DEXAMETHASONE SODIUM PHOSPHATE 4 MG/ML
6 INJECTION, SOLUTION INTRA-ARTICULAR; INTRALESIONAL; INTRAMUSCULAR; INTRAVENOUS; SOFT TISSUE DAILY
Status: COMPLETED | OUTPATIENT
Start: 2020-09-10 | End: 2020-09-12

## 2020-09-10 RX ORDER — INSULIN GLARGINE 100 [IU]/ML
50 INJECTION, SOLUTION SUBCUTANEOUS 2 TIMES DAILY
Status: DISCONTINUED | OUTPATIENT
Start: 2020-09-10 | End: 2020-09-16 | Stop reason: HOSPADM

## 2020-09-10 RX ORDER — ALPRAZOLAM 0.5 MG/1
0.5 TABLET ORAL NIGHTLY PRN
Status: DISCONTINUED | OUTPATIENT
Start: 2020-09-10 | End: 2020-09-12

## 2020-09-10 RX ADMIN — INSULIN GLARGINE 50 UNITS: 100 INJECTION, SOLUTION SUBCUTANEOUS at 20:19

## 2020-09-10 RX ADMIN — BUSPIRONE HYDROCHLORIDE 10 MG: 10 TABLET ORAL at 09:43

## 2020-09-10 RX ADMIN — ALLOPURINOL 500 MG: 300 TABLET ORAL at 09:43

## 2020-09-10 RX ADMIN — PANTOPRAZOLE SODIUM 40 MG: 40 TABLET, DELAYED RELEASE ORAL at 09:42

## 2020-09-10 RX ADMIN — DEXAMETHASONE SODIUM PHOSPHATE 6 MG: 4 INJECTION, SOLUTION INTRAMUSCULAR; INTRAVENOUS at 09:46

## 2020-09-10 RX ADMIN — INSULIN LISPRO 2 UNITS: 100 INJECTION, SOLUTION INTRAVENOUS; SUBCUTANEOUS at 09:32

## 2020-09-10 RX ADMIN — AMLODIPINE BESYLATE 5 MG: 5 TABLET ORAL at 09:43

## 2020-09-10 RX ADMIN — CEFEPIME HYDROCHLORIDE 2 G: 2 INJECTION, POWDER, FOR SOLUTION INTRAVENOUS at 16:47

## 2020-09-10 RX ADMIN — DILTIAZEM HYDROCHLORIDE 60 MG: 60 TABLET, FILM COATED ORAL at 09:42

## 2020-09-10 RX ADMIN — HYDRALAZINE HYDROCHLORIDE 50 MG: 50 TABLET, FILM COATED ORAL at 20:20

## 2020-09-10 RX ADMIN — GABAPENTIN 800 MG: 400 CAPSULE ORAL at 09:42

## 2020-09-10 RX ADMIN — ENOXAPARIN SODIUM 135 MG: 150 INJECTION SUBCUTANEOUS at 20:20

## 2020-09-10 RX ADMIN — DILTIAZEM HYDROCHLORIDE 60 MG: 60 TABLET, FILM COATED ORAL at 20:20

## 2020-09-10 RX ADMIN — ASPIRIN 81 MG: 81 TABLET, CHEWABLE ORAL at 09:43

## 2020-09-10 RX ADMIN — DANAZOL 400 MG: 200 CAPSULE ORAL at 20:20

## 2020-09-10 RX ADMIN — GABAPENTIN 800 MG: 400 CAPSULE ORAL at 20:20

## 2020-09-10 RX ADMIN — DOCUSATE SODIUM 100 MG: 100 CAPSULE, LIQUID FILLED ORAL at 20:20

## 2020-09-10 RX ADMIN — ARIPIPRAZOLE 15 MG: 15 TABLET ORAL at 09:43

## 2020-09-10 RX ADMIN — INSULIN LISPRO 6 UNITS: 100 INJECTION, SOLUTION INTRAVENOUS; SUBCUTANEOUS at 13:40

## 2020-09-10 RX ADMIN — INSULIN GLARGINE 50 UNITS: 100 INJECTION, SOLUTION SUBCUTANEOUS at 09:33

## 2020-09-10 RX ADMIN — CITALOPRAM 30 MG: 20 TABLET, FILM COATED ORAL at 09:43

## 2020-09-10 RX ADMIN — Medication 10 ML: at 09:48

## 2020-09-10 RX ADMIN — DANAZOL 400 MG: 200 CAPSULE ORAL at 09:42

## 2020-09-10 RX ADMIN — FUROSEMIDE 40 MG: 40 TABLET ORAL at 09:42

## 2020-09-10 RX ADMIN — ATORVASTATIN CALCIUM 40 MG: 40 TABLET, FILM COATED ORAL at 20:20

## 2020-09-10 RX ADMIN — CEFEPIME HYDROCHLORIDE 2 G: 2 INJECTION, POWDER, FOR SOLUTION INTRAVENOUS at 04:01

## 2020-09-10 RX ADMIN — FOLIC ACID 1 MG: 1 TABLET ORAL at 09:42

## 2020-09-10 RX ADMIN — TAMSULOSIN HYDROCHLORIDE 0.4 MG: 0.4 CAPSULE ORAL at 20:20

## 2020-09-10 RX ADMIN — INSULIN LISPRO 10 UNITS: 100 INJECTION, SOLUTION INTRAVENOUS; SUBCUTANEOUS at 18:03

## 2020-09-10 RX ADMIN — BUSPIRONE HYDROCHLORIDE 10 MG: 10 TABLET ORAL at 20:20

## 2020-09-10 RX ADMIN — Medication 1000 UNITS: at 09:42

## 2020-09-10 RX ADMIN — HYDRALAZINE HYDROCHLORIDE 50 MG: 50 TABLET, FILM COATED ORAL at 09:42

## 2020-09-10 RX ADMIN — DOCUSATE SODIUM 100 MG: 100 CAPSULE, LIQUID FILLED ORAL at 09:42

## 2020-09-10 RX ADMIN — ENOXAPARIN SODIUM 135 MG: 150 INJECTION SUBCUTANEOUS at 09:34

## 2020-09-10 RX ADMIN — Medication 10 ML: at 20:21

## 2020-09-10 ASSESSMENT — PAIN SCALES - GENERAL
PAINLEVEL_OUTOF10: 0

## 2020-09-10 NOTE — PLAN OF CARE
Problem: Falls - Risk of:  Goal: Will remain free from falls  Description: Will remain free from falls  9/9/2020 0032 by Mary Pace RN  Outcome: Ongoing  Note: Patient in fall precautions, nonskid socks on, call light within reach, hourly checks performed, fall bracelet on, up with staff and gait belt     Problem: Airway Clearance - Ineffective  Goal: Achieve or maintain patent airway  9/9/2020 0032 by Mary Pace RN  Outcome: Ongoing     Problem: Gas Exchange - Impaired  Goal: Absence of hypoxia  9/9/2020 0032 by Mary Pace RN  Outcome: Ongoing  Note: Patient at 60L/min and 70% FIO2 and unable to be weaned down at this time, patient has bipap and high flow nasal canula, o2 sats between 90-95%, O2 drops below 90 when active     Problem: Breathing Pattern - Ineffective  Goal: Ability to achieve and maintain a regular respiratory rate  9/9/2020 0032 by Mary Pace RN  Outcome: Ongoing  Note: Patient has unlabored respirations and rate between 22-23     Problem: Body Temperature -  Risk of, Imbalanced  Goal: Ability to maintain a body temperature within defined limits  9/9/2020 0032 by Mary Pace RN  Outcome: Met This Shift  Note: Body temp within defined limits     Problem: Isolation Precautions - Risk of Spread of Infection  Goal: Prevent transmission of infection  9/9/2020 0032 by Mary Pace RN  Outcome: Ongoing  Note: Patient in droplet (covid) precautions     Problem: Nutrition Deficits  Goal: Optimize nutrtional status  9/9/2020 0032 by Mary Pace RN  Outcome: Ongoing  Note: Patient eats all of meals and maintains good appetite  Problem: Serum Glucose Level - Abnormal:  Goal: Ability to maintain appropriate glucose levels will improve  Description: Ability to maintain appropriate glucose levels will improve  9/9/2020 2244 by Mary Pace RN  Outcome: Not Met This Shift  Note: Patient unable to maintain appropriate glucose levels.  Levels have been 400+     Problem: Discharge Planning:  Goal: Discharged to appropriate level of care  Description: Discharged to appropriate level of care  9/9/2020 2244 by Chrissy Mabry RN  Outcome: Ongoing  Note: No discharge orders at this time. Patient plans to be discharged new apartment with [facility] home health     Problem: Skin Integrity:  Goal: Absence of new skin breakdown  Description: Absence of new skin breakdown  9/9/2020 2244 by Chrissy Mabry RN  Outcome: Ongoing  Note: Patient shows no new signs of skin breakdown, repositioned every 2 hours,skin assessed multiple times throughout shift    Care plan reviewed with patient. Patient verbalizes understanding of the plan of care and but does not contribute to goal setting.

## 2020-09-10 NOTE — PLAN OF CARE
Problem: Falls - Risk of:  Goal: Will remain free from falls  Description: Will remain free from falls  Outcome: Met This Shift  Note: Continue fall precautions, up with assist. Continue bed alarm. Problem: Falls - Risk of:  Goal: Absence of physical injury  Description: Absence of physical injury  Outcome: Met This Shift     Problem: Breathing Pattern - Ineffective  Goal: Ability to achieve and maintain a regular respiratory rate  Outcome: Met This Shift  Note: Patient tolerating high flow oxygen. No SOB noted. Problem: Body Temperature -  Risk of, Imbalanced  Goal: Ability to maintain a body temperature within defined limits  Outcome: Met This Shift  Note: Afebrile today. Problem: Body Temperature -  Risk of, Imbalanced  Goal: Will regain or maintain usual level of consciousness  Outcome: Met This Shift  Note: Patient alert and oriented. Problem: Isolation Precautions - Risk of Spread of Infection  Goal: Prevent transmission of infection  Outcome: Met This Shift  Note: Continue isolation precautions, COVID, MRSA, VRE. Problem: Skin Integrity:  Goal: Will show no infection signs and symptoms  Description: Will show no infection signs and symptoms  Outcome: Met This Shift  Note: Afebrile today. Continue to assess skin for infection. Problem: Skin Integrity:  Goal: Absence of new skin breakdown  Description: Absence of new skin breakdown  Outcome: Met This Shift     Problem: Discharge Planning:  Goal: Discharged to appropriate level of care  Description: Discharged to appropriate level of care  Outcome: Met This Shift  Note: Continue discharge planning. Planning home health. Problem: Airway Clearance - Ineffective  Goal: Achieve or maintain patent airway  Outcome: Ongoing  Note: Patient continues on high flow nasal canula. Continue to try and wean. Lungs clear/diminished.       Problem: Gas Exchange - Impaired  Goal: Absence of hypoxia  Outcome: Ongoing  Note: Patient continues on high flow nasal canula. Try to wean as tolerated per RT. Problem: Gas Exchange - Impaired  Goal: Promote optimal lung function  Outcome: Ongoing     Problem: Body Temperature -  Risk of, Imbalanced  Goal: Complications related to the disease process, condition or treatment will be avoided or minimized  Outcome: Ongoing     Problem: Impaired respiratory status  Goal: Able to breathe comfortably  Description: Able to breathe comfortably  9/10/2020 1008 by Gisel Clemente RCP  Outcome: Ongoing     Problem: Serum Glucose Level - Abnormal:  Goal: Ability to maintain appropriate glucose levels will improve  Description: Ability to maintain appropriate glucose levels will improve  Outcome: Ongoing  Note: Continue to check chems ACHS. Humalog per scale. Chems 300-400's at times. Problem: Nutrition Deficits  Goal: Optimize nutrtional status  Note: Patient eats 100% meals. Care plan reviewed with patient. Patient verbalize understanding of the plan of care and contribute to goal setting.

## 2020-09-10 NOTE — PLAN OF CARE
Problem: Impaired respiratory status  Goal: Able to breathe comfortably  Description: Able to breathe comfortably  9/10/2020 1008 by Mel Russo RCP  Outcome: Ongoing   Continue to wean high flow oxygen as tolerated.

## 2020-09-10 NOTE — PROGRESS NOTES
Inpatient Mobility Raw Score : 18  AM-PAC Inpatient T-Scale Score : 43.63    ASSESSMENT:  Assessment: Patient progressing toward established goals. Activity Tolerance:  Patient tolerance of  treatment: good. Pt tolerated session well. Pt demos decreased strength, balance and stability on feet. Pt will benefit from cont PT at this itme to ensure safety     Equipment Recommendations: Other: cont to monitor for pt needs  Discharge Recommendations:  Continue to assess pending progress would benefit from cont PT, may need SNF vs HH    Plan: Times per week: 5X GM  Times per day: Daily  Specific instructions for Next Treatment: therex and mobility    Patient Education  Patient Education: Plan of Care, Transfers, Gait, Verbal Exercise Instruction    Goals:  Patient goals : go to apt from hospital next Monday  Short term goals  Time Frame for Short term goals: by discharge  Short term goal 1: bed mobility with MOD I to get in/out of bed  Short term goal 2: transfer with MOD I to get in/out of chairs  Short term goal 3: amb >100'x1 without AD and MOD I to walk safely in apt  Long term goals  Time Frame for Long term goals : no LTGs set secondary to short ELOS    Following session, patient left in safe position with all fall risk precautions in place.

## 2020-09-10 NOTE — PLAN OF CARE
Problem: Impaired respiratory status  Goal: Able to breathe comfortably  Description: Able to breathe comfortably  Outcome: Ongoing  Note: Patient currently on HFNC 60L and 45%

## 2020-09-10 NOTE — PROGRESS NOTES
that improved with COVID19 infections vitamin D helps breakdown bradykinin and in turn reducing sequelae of COVID19. Continue vitamin D 1000 units daily supplementation   4. Atrial fibrillation: Rate controlled - Cardizem 60mg  BID. Patient was previously on Eliquis however due to weight being greater than 120 kg. Lovenox 135 mg subcutaneous BID is indicated as treatment of choice for anticoagulation  5. Obstructive sleep apnea: Patient reports wearing CPAP however he is unaware of the settings at home.  Patient placed on BiPAP at night and as needed throughout the day  6. Uncontrolled diabetes mellitus type 2 with hyperglycemia: Uncontrolled likely due to steroid use. Glucose continues to increase. Start Lantus 50 units BID, continue ISS, gabapentin 800 mg twice daily. 7. Chronic diastolic congestive heart failure: Preserved ejection fraction-last echo was completed 10/10/2019 reports: Normal left ventricle size and systolic function. Ejection fraction was estimated at 55 to 60 %. There were no regional left ventricular wall motion abnormalities and wall thickness was within normal limits. The left atrium is Mildly dilated.  We will continue Lasix 40 mg daily, Norvasc 5 mg daily, Cardizem 60 mg twice daily  8. History of rheumatoid arthritis: Rheumatoid factor positive. Percocet 5-325 mg every 4 hours for moderate to severe pain, however patient states he has not had a bowel movement in greater than 4 days started Colace 100 mg twice daily  9. Morbid obesity: BMI is 39. 22.  Weight loss will be encouraged when appropriate  10. Anxiety/depression: Continue BuSpar 10 mg twice daily, Celexa 30 mg daily,Abilify 15 mg daily  11. History of hyperlipidemia: Continue Lipitor 40 mg nightly  12. History of hypertension: Continue Norvasc 5 mg daily, Lasix 40 mg daily  13. History of chronic gout: Continue allopurinol 500 mg daily  14. History of BPH: Continue Flomax 0.4 mg nightly  15.  GI prophylaxis:   Protonix 40 mg gout, CAD, BPH, atrial fibrillation, rheumatoid arthritis  Family History: Mother-heart disease with history of MI at 48years of age, .  Paternal aunt -lung cancer  Social History: History of alcohol abuse, lifetime non-smoker, denies substance abuse, , resides at [facility]  Patient expresses that he is still experiencing pain with deep inhalation. Patient also admits that he has had a bowel movement yesterday  Patient expresses that he is not getting sleep due to high-dose steroids. All other systems reviewed negative  Scheduled Meds:   docusate sodium  100 mg Oral BID    Vitamin D  1,000 Units Oral Daily    danazol  400 mg Oral BID    insulin glargine  30 Units Subcutaneous BID    enoxaparin  1 mg/kg Subcutaneous BID    allopurinol  500 mg Oral Daily    amLODIPine  5 mg Oral Daily    ARIPiprazole  15 mg Oral Daily    atorvastatin  40 mg Oral Nightly    busPIRone  10 mg Oral BID    citalopram  30 mg Oral Daily    dilTIAZem  60 mg Oral BID    folic acid  1 mg Oral Daily    furosemide  40 mg Oral Daily    gabapentin  800 mg Oral BID    hydrALAZINE  50 mg Oral BID    pantoprazole  40 mg Oral Daily    tamsulosin  0.4 mg Oral Nightly    sodium chloride flush  10 mL Intravenous 2 times per day    insulin lispro  0-12 Units Subcutaneous TID WC    insulin lispro  0-6 Units Subcutaneous Nightly    cefepime  2 g Intravenous Q12H    methylPREDNISolone  40 mg Intravenous Daily    polyethylene glycol  17 g Oral Every Other Day     Continuous Infusions:   dextrose         PHYSICAL EXAMINATION:  T: 98.1.  P: 69. RR: 22. B/P: 124/69. FiO2: 40. O2 Sat: 94.  I/O:  1030/1300  Body mass index is 44.15 kg/m². GCS:   15  General:   Acutely ill obese male currently requiring high flow oxygen  HEENT:  normocephalic and atraumatic. No scleral icterus. PERR. Conjunctival injected  Neck: supple. No Thyromegaly. Lungs: Diminished breath sounds. No retractions.   High flow 55 L/min FiO2 40% cardiac: RRR. No JVD. Abdomen: soft. Nontender. Extremities:  No clubbing or cyanosis. Trace edema bilateral  Vasculature: capillary refill < 3 seconds. Palpable dorsalis pedis pulses. Skin:  warm and dry. Psych:  Alert and oriented x3. Affect appropriate  Lymph:  No supraclavicular adenopathy. Neurologic:  No focal deficit. No seizures. Data: (All radiographs, tracings, PFTs, and imaging are personally viewed and interpreted unless otherwise noted).  Sodium 141, potassium 4.1, chloride 100, CO2 32, BUN 20, creatinine 0.8, calcium 9.1   WBC 10.9, hemoglobin 13.1, hematocrit 40.4, platelets 877      Pro-Luciano 0.19   Telemetry shows normal sinus rhythm  · Vitamin D 25 hydroxy 40 ng.ml  · Telemetry shows normal sinus rhythm  · Chest x-ray 9/10/2020 reports: Suboptimal inflation of lungs. Borderline heart size. No effusions. Moderate mixed infiltrate scattered throughout both lungs, consistent with pneumonia. Overall appearance improved from prior study. · Chest x-ray 9/9/20 reports: Borderline heart size. No effusion. Moderate least severe mixed infiltrate scattered throughout both lungs, consistent with pneumonia. Slightly improved since prior study  · CXR 9/6/2020 reports: Soft infiltration of lungs.  Relative severe mixed infiltrate scattered throughout both lungs consistent with history of COVID-19 infection.  no effusion  · Chest CT without contrast 9/6/2020 reports: Extensive patchy infiltrates in consolidation throughout both lung fields which are most consolidated within bilateral perihilar region. Charlynne Plane is no pleural effusion. Meets Continued ICU Level Care Criteria:    [x] Yes   [] No - Transfer Planned to listed location:  [] HOSPITALIST CONTACTED-      Case and plan discussed with Dr. Gina Mason. Electronically signed by Braxton Lincoln DO  CRITICAL CARE SPECIALIST  Patient seen by me. Case discussed with resident physician.   Patient requiring high flow oxygen with persistent infiltrates bilaterally. Patient with acute lung injury from COVID-19. At risk for decompensation requiring mechanical ventilator support. Undergoing danazol therapy to interrupt suspected bradykinin storm. Electronically signed by Merlinda Cables Valeta Stank MD.

## 2020-09-10 NOTE — CARE COORDINATION
9/10/20, 1:09 PM EDT    DISCHARGE ON GOING [address] day: 4  Location: -05/005-A Reason for admit: COVID-19 virus infection [U07.1]   Procedure:   9/6 CT Chest: There are extensive patchy infiltrates and consolidation throughout both lung fields which are most consolidated within the bilateral perihilar region. There is no pleural effusion  9/10 CXR:   1. Suboptimal inflation of lungs. Borderline heart size. No effusion. 2. Moderate mixed infiltrates scattered throughout both lungs, consistent with pneumonia. 3. Overall appearance slightly improved from prior. Treatment Plan of Care: Remains on vapotherm 40% FIO2, 60L, with sats 91%. Bipap HS & PRN. Afebrile. NSR. Ox4. Follows commands. PT/OT. +COVID 19. +MRSA nares. +VRE rectum. Telemetry, I&O, daily weight, SCDs, mason care. Allopurinol, norvasc, prn xanax, abilify, asa, lipitor, buspar, IV cefepime, celexa, danazol, IV decadron 6 mg daily, cardizem, lovenox bid, folic acid, po lasix daily, neurontin, hydralazine, lantus bid, SSI ACHS, protonix, flomax, vit d. Procal 0.19, , alb 3.2, wbc 10.9, hgb 13.1. Barriers to Discharge: on vapotherm  PCP: Kayla Chairez DO  Readmission Risk Score: 30%  Patient Goals/Plan/Treatment Preferences: From [facility]. Plan home to his new apartment with new [facility]  vs return to [facility] - would require precert. SW on case.

## 2020-09-11 LAB
ALBUMIN SERPL-MCNC: 3.4 G/DL (ref 3.5–5.1)
ALP BLD-CCNC: 72 U/L (ref 38–126)
ALT SERPL-CCNC: 63 U/L (ref 11–66)
ANION GAP SERPL CALCULATED.3IONS-SCNC: 9 MEQ/L (ref 8–16)
AST SERPL-CCNC: 30 U/L (ref 5–40)
BASOPHILS # BLD: 0.7 %
BASOPHILS ABSOLUTE: 0.1 THOU/MM3 (ref 0–0.1)
BILIRUB SERPL-MCNC: 0.3 MG/DL (ref 0.3–1.2)
BUN BLDV-MCNC: 20 MG/DL (ref 7–22)
CALCIUM SERPL-MCNC: 9.2 MG/DL (ref 8.5–10.5)
CHLORIDE BLD-SCNC: 98 MEQ/L (ref 98–111)
CO2: 32 MEQ/L (ref 23–33)
CREAT SERPL-MCNC: 0.9 MG/DL (ref 0.4–1.2)
DIFFERENTIAL, MANUAL: NORMAL
EKG ATRIAL RATE: 56 BPM
EKG P AXIS: 32 DEGREES
EKG P-R INTERVAL: 142 MS
EKG Q-T INTERVAL: 456 MS
EKG QRS DURATION: 80 MS
EKG QTC CALCULATION (BAZETT): 440 MS
EKG R AXIS: -12 DEGREES
EKG T AXIS: -34 DEGREES
EKG VENTRICULAR RATE: 56 BPM
EOSINOPHIL # BLD: 0 %
EOSINOPHILS ABSOLUTE: 0 THOU/MM3 (ref 0–0.4)
ERYTHROCYTE [DISTWIDTH] IN BLOOD BY AUTOMATED COUNT: 15.5 % (ref 11.5–14.5)
ERYTHROCYTE [DISTWIDTH] IN BLOOD BY AUTOMATED COUNT: 50.5 FL (ref 35–45)
GFR SERPL CREATININE-BSD FRML MDRD: > 90 ML/MIN/1.73M2
GLUCOSE BLD-MCNC: 195 MG/DL (ref 70–108)
GLUCOSE BLD-MCNC: 224 MG/DL (ref 70–108)
GLUCOSE BLD-MCNC: 262 MG/DL (ref 70–108)
GLUCOSE BLD-MCNC: 326 MG/DL (ref 70–108)
GLUCOSE BLD-MCNC: 393 MG/DL (ref 70–108)
HCT VFR BLD CALC: 41 % (ref 42–52)
HEMOGLOBIN: 13.5 GM/DL (ref 14–18)
LD: 305 U/L (ref 100–190)
LYMPHOCYTES # BLD: 9.4 %
LYMPHOCYTES ABSOLUTE: 1.2 THOU/MM3 (ref 1–4.8)
MAGNESIUM: 1.9 MG/DL (ref 1.6–2.4)
MCH RBC QN AUTO: 29.7 PG (ref 26–33)
MCHC RBC AUTO-ENTMCNC: 32.9 GM/DL (ref 32.2–35.5)
MCV RBC AUTO: 90.1 FL (ref 80–94)
MONOCYTES # BLD: 8.3 %
MONOCYTES ABSOLUTE: 1.1 THOU/MM3 (ref 0.4–1.3)
NUCLEATED RED BLOOD CELLS: 1 /100 WBC
PLATELET # BLD: 260 THOU/MM3 (ref 130–400)
PLATELET ESTIMATE: ADEQUATE
PMV BLD AUTO: 11.7 FL (ref 9.4–12.4)
POTASSIUM SERPL-SCNC: 4.1 MEQ/L (ref 3.5–5.2)
RBC # BLD: 4.55 MILL/MM3 (ref 4.7–6.1)
SEG NEUTROPHILS: 74.3 %
SEGMENTED NEUTROPHILS ABSOLUTE COUNT: 9.7 THOU/MM3 (ref 1.8–7.7)
SODIUM BLD-SCNC: 139 MEQ/L (ref 135–145)
TOTAL PROTEIN: 6.3 G/DL (ref 6.1–8)
WBC # BLD: 13 THOU/MM3 (ref 4.8–10.8)

## 2020-09-11 PROCEDURE — 2580000003 HC RX 258: Performed by: INTERNAL MEDICINE

## 2020-09-11 PROCEDURE — 99233 SBSQ HOSP IP/OBS HIGH 50: CPT | Performed by: INTERNAL MEDICINE

## 2020-09-11 PROCEDURE — 85025 COMPLETE CBC W/AUTO DIFF WBC: CPT

## 2020-09-11 PROCEDURE — 6370000000 HC RX 637 (ALT 250 FOR IP): Performed by: STUDENT IN AN ORGANIZED HEALTH CARE EDUCATION/TRAINING PROGRAM

## 2020-09-11 PROCEDURE — 80053 COMPREHEN METABOLIC PANEL: CPT

## 2020-09-11 PROCEDURE — 2060000000 HC ICU INTERMEDIATE R&B

## 2020-09-11 PROCEDURE — 97110 THERAPEUTIC EXERCISES: CPT

## 2020-09-11 PROCEDURE — 2700000000 HC OXYGEN THERAPY PER DAY

## 2020-09-11 PROCEDURE — 6360000002 HC RX W HCPCS: Performed by: INTERNAL MEDICINE

## 2020-09-11 PROCEDURE — 6370000000 HC RX 637 (ALT 250 FOR IP): Performed by: INTERNAL MEDICINE

## 2020-09-11 PROCEDURE — 93005 ELECTROCARDIOGRAM TRACING: CPT | Performed by: NURSE PRACTITIONER

## 2020-09-11 PROCEDURE — 93010 ELECTROCARDIOGRAM REPORT: CPT | Performed by: NUCLEAR MEDICINE

## 2020-09-11 PROCEDURE — 97535 SELF CARE MNGMENT TRAINING: CPT

## 2020-09-11 PROCEDURE — 6360000002 HC RX W HCPCS: Performed by: STUDENT IN AN ORGANIZED HEALTH CARE EDUCATION/TRAINING PROGRAM

## 2020-09-11 PROCEDURE — 97116 GAIT TRAINING THERAPY: CPT

## 2020-09-11 PROCEDURE — 83735 ASSAY OF MAGNESIUM: CPT

## 2020-09-11 PROCEDURE — 83615 LACTATE (LD) (LDH) ENZYME: CPT

## 2020-09-11 PROCEDURE — 94761 N-INVAS EAR/PLS OXIMETRY MLT: CPT

## 2020-09-11 PROCEDURE — 36415 COLL VENOUS BLD VENIPUNCTURE: CPT

## 2020-09-11 PROCEDURE — 82948 REAGENT STRIP/BLOOD GLUCOSE: CPT

## 2020-09-11 RX ADMIN — ATORVASTATIN CALCIUM 40 MG: 40 TABLET, FILM COATED ORAL at 20:15

## 2020-09-11 RX ADMIN — GABAPENTIN 800 MG: 400 CAPSULE ORAL at 20:15

## 2020-09-11 RX ADMIN — INSULIN GLARGINE 50 UNITS: 100 INJECTION, SOLUTION SUBCUTANEOUS at 10:39

## 2020-09-11 RX ADMIN — DILTIAZEM HYDROCHLORIDE 60 MG: 60 TABLET, FILM COATED ORAL at 10:34

## 2020-09-11 RX ADMIN — DANAZOL 400 MG: 200 CAPSULE ORAL at 20:15

## 2020-09-11 RX ADMIN — ENOXAPARIN SODIUM 135 MG: 150 INJECTION SUBCUTANEOUS at 20:18

## 2020-09-11 RX ADMIN — DOCUSATE SODIUM 100 MG: 100 CAPSULE, LIQUID FILLED ORAL at 10:48

## 2020-09-11 RX ADMIN — TAMSULOSIN HYDROCHLORIDE 0.4 MG: 0.4 CAPSULE ORAL at 20:16

## 2020-09-11 RX ADMIN — GABAPENTIN 800 MG: 400 CAPSULE ORAL at 10:31

## 2020-09-11 RX ADMIN — CEFEPIME HYDROCHLORIDE 2 G: 2 INJECTION, POWDER, FOR SOLUTION INTRAVENOUS at 17:58

## 2020-09-11 RX ADMIN — ASPIRIN 81 MG: 81 TABLET, CHEWABLE ORAL at 10:33

## 2020-09-11 RX ADMIN — HYDRALAZINE HYDROCHLORIDE 50 MG: 50 TABLET, FILM COATED ORAL at 20:14

## 2020-09-11 RX ADMIN — BUSPIRONE HYDROCHLORIDE 10 MG: 10 TABLET ORAL at 20:15

## 2020-09-11 RX ADMIN — PANTOPRAZOLE SODIUM 40 MG: 40 TABLET, DELAYED RELEASE ORAL at 10:33

## 2020-09-11 RX ADMIN — ALLOPURINOL 500 MG: 300 TABLET ORAL at 10:33

## 2020-09-11 RX ADMIN — DOCUSATE SODIUM 100 MG: 100 CAPSULE, LIQUID FILLED ORAL at 20:16

## 2020-09-11 RX ADMIN — Medication 10 ML: at 18:03

## 2020-09-11 RX ADMIN — CITALOPRAM 30 MG: 20 TABLET, FILM COATED ORAL at 10:32

## 2020-09-11 RX ADMIN — Medication 10 ML: at 20:17

## 2020-09-11 RX ADMIN — ENOXAPARIN SODIUM 135 MG: 150 INJECTION SUBCUTANEOUS at 10:36

## 2020-09-11 RX ADMIN — INSULIN GLARGINE 50 UNITS: 100 INJECTION, SOLUTION SUBCUTANEOUS at 20:27

## 2020-09-11 RX ADMIN — BUSPIRONE HYDROCHLORIDE 10 MG: 10 TABLET ORAL at 10:32

## 2020-09-11 RX ADMIN — Medication 1000 UNITS: at 10:31

## 2020-09-11 RX ADMIN — HYDRALAZINE HYDROCHLORIDE 50 MG: 50 TABLET, FILM COATED ORAL at 10:49

## 2020-09-11 RX ADMIN — ARIPIPRAZOLE 15 MG: 15 TABLET ORAL at 10:33

## 2020-09-11 RX ADMIN — FUROSEMIDE 40 MG: 40 TABLET ORAL at 10:32

## 2020-09-11 RX ADMIN — AMLODIPINE BESYLATE 5 MG: 5 TABLET ORAL at 10:33

## 2020-09-11 RX ADMIN — FOLIC ACID 1 MG: 1 TABLET ORAL at 10:32

## 2020-09-11 RX ADMIN — DANAZOL 400 MG: 200 CAPSULE ORAL at 10:46

## 2020-09-11 RX ADMIN — DEXAMETHASONE SODIUM PHOSPHATE 6 MG: 4 INJECTION, SOLUTION INTRAMUSCULAR; INTRAVENOUS at 10:35

## 2020-09-11 RX ADMIN — CEFEPIME HYDROCHLORIDE 2 G: 2 INJECTION, POWDER, FOR SOLUTION INTRAVENOUS at 04:21

## 2020-09-11 RX ADMIN — Medication 10 ML: at 10:35

## 2020-09-11 RX ADMIN — ALPRAZOLAM 0.5 MG: 0.5 TABLET ORAL at 23:14

## 2020-09-11 RX ADMIN — DILTIAZEM HYDROCHLORIDE 60 MG: 60 TABLET, FILM COATED ORAL at 20:15

## 2020-09-11 ASSESSMENT — PAIN SCALES - GENERAL
PAINLEVEL_OUTOF10: 0

## 2020-09-11 NOTE — PLAN OF CARE
Problem: Falls - Risk of:  Goal: Absence of physical injury  Description: Absence of physical injury  Outcome: Met This Shift     Problem: Airway Clearance - Ineffective  Goal: Achieve or maintain patent airway  Outcome: Met This Shift  Note: Able to wean of high vivek O2 to nasal cannula. Has a strong cough on command. Non congested. Problem: Body Temperature -  Risk of, Imbalanced  Goal: Ability to maintain a body temperature within defined limits  Outcome: Met This Shift     Problem: Body Temperature -  Risk of, Imbalanced  Goal: Will regain or maintain usual level of consciousness  Outcome: Met This Shift  Note: Alert and oriented     Problem: Body Temperature -  Risk of, Imbalanced  Goal: Complications related to the disease process, condition or treatment will be avoided or minimized  Outcome: Met This Shift     Problem: Isolation Precautions - Risk of Spread of Infection  Goal: Prevent transmission of infection  Outcome: Met This Shift     Problem: Nutrition Deficits  Goal: Optimize nutrtional status  Outcome: Met This Shift     Problem: Skin Integrity:  Goal: Will show no infection signs and symptoms  Description: Will show no infection signs and symptoms  Outcome: Met This Shift     Problem: Skin Integrity:  Goal: Absence of new skin breakdown  Description: Absence of new skin breakdown  Outcome: Met This Shift     Problem: Falls - Risk of:  Goal: Will remain free from falls  Description: Will remain free from falls  Outcome: Ongoing  Note: Has some weakness but has good gait and balance. Needs assist due to monitor wires. Problem: Gas Exchange - Impaired  Goal: Absence of hypoxia  Outcome: Ongoing  Note: Weaned off high vivek O2 to nasal cannula. Saturation on 4L/NC is .  Does still have some BENJAMIN and saturation dips to low 90S with activity     Problem: Gas Exchange - Impaired  Goal: Promote optimal lung function  Outcome: Ongoing     Problem: Discharge Planning:  Goal: Discharged to appropriate level of care  Description: Discharged to appropriate level of care  Outcome: Ongoing  Note: Pts plan is to return to his own apartment with home health assist     Problem: Serum Glucose Level - Abnormal:  Goal: Ability to maintain appropriate glucose levels will improve  Description: Ability to maintain appropriate glucose levels will improve  Outcome: Ongoing  Note: Blood sugars 195-326. On lantus BID that was increased today to 50 units. Also on sliding scale. Problem: Breathing Pattern - Ineffective  Goal: Ability to achieve and maintain a regular respiratory rate  Note: Resp easy and regular. BENJAMIN after doing several activities for PT    Care plan reviewed with patient. Patient verbalize understanding of the plan of care and contribute to goal setting. No family present.

## 2020-09-11 NOTE — PROGRESS NOTES
[facility]  INPATIENT PHYSICAL THERAPY  DAILY NOTE  [facility] CVICU 4B - 4B-05/005-A      Time In: 1000  Time Out: 1040  Timed Code Treatment Minutes: 36 Minutes  Minutes: 40          Date: 2020  Patient Name: Kalyn Beauchamp,  Gender:  male        MRN: [id number]  : 1970  (44 y.o.)     Referring Practitioner: Dr. Nidia Larios  Diagnosis: COVID-19 virus infection  Additional Pertinent Hx: admit with above diagnosis, pneumonia, KIARA     Prior Level of Function:  Lives With: Alone  Type of Home: Facility  Home Layout: One level  Home Equipment: (no AD PTA)        ADL Assistance: Independent  Homemaking Responsibilities: No  Ambulation Assistance: Independent  Transfer Assistance: Independent  Additional Comments: was planning to discharge from ECF soon to [facility] which has a step to enter then an elevator pt can use. Pt has been at [facility] x 1 year. Restrictions/Precautions:  Restrictions/Precautions: General Precautions, Fall Risk  Position Activity Restriction  Other position/activity restrictions: high flow O2    SUBJECTIVE: pt up in chair he was highly motivated and eager to get moving, pt cont to be on high flow O2 his O2 sats were above 90% the entire session however at times his resp rate was elevated at times rest breaks given     PAIN: 0/10:       OBJECTIVE:  Bed Mobility:  Not Tested    Transfers:  Sit to Stand: Stand By Assist  Stand to Sit:Stand By Assistance  He also completed 5 times sit to stand for strength and endurance   Ambulation:  Stand By Assistance  Distance: 20x1 5x2  Surface: Level Tile  Device:No Device  Gait Deviations:  Pt on high flow O2 so limited due to lines     Balance:  dynamic standing balance w/ one UE at support with SBA     Exercise:  Patient was guided in 1 set(s) 10 reps of exercise to both lower extremities.   Standing marches, Standing hip abduction/adduction, Standing hip extension, Standing hamstring curls, Mini squats and and then completed 5 reps of step ups on 4 in step all ex with one to two UE at support and rest breaks between ex, pt also completed 5 min on portable pedal bike . Exercises were completed for increased independence with functional mobility. Functional Outcome Measures: Completed  AM-PAC Inpatient Mobility Raw Score : 18  AM-PAC Inpatient T-Scale Score : 43.63    ASSESSMENT:  Assessment: Patient progressing toward established goals. and however he does cont to be on high flow O2 and decreased strength adn endurance he would greatly benefit from cont skilled therapy   Activity Tolerance:  Patient tolerance of  treatment: fair. He required rest breaks      Equipment Recommendations: Other: cont to monitor for pt needs  Discharge Recommendations:    (pt would benefit from a skilled therapy stay prior to return to his apartment)    Plan: Times per week: 5X GM  Times per day: Daily  Specific instructions for Next Treatment: therex and mobility    Patient Education  Patient Education: Plan of Care    Goals:  Patient goals : go to apt from hospital next Monday  Short term goals  Time Frame for Short term goals: by discharge  Short term goal 1: bed mobility with MOD I to get in/out of bed  Short term goal 2: transfer with MOD I to get in/out of chairs  Short term goal 3: amb >100'x1 without AD and MOD I to walk safely in apt  Long term goals  Time Frame for Long term goals : no LTGs set secondary to short ELOS    Following session, patient left in safe position with all fall risk precautions in place.

## 2020-09-11 NOTE — PLAN OF CARE
Problem: Impaired respiratory status  Goal: Able to breathe comfortably  Description: Able to breathe comfortably  Outcome: Ongoing  Note: Patient remains on High Flow Nasal Cannula 40L 40%. Will continue to wean as patient tolerates.

## 2020-09-11 NOTE — PLAN OF CARE
Problem: Falls - Risk of:  Goal: Will remain free from falls  Description: Will remain free from falls  9/10/2020 2238 by Fernando Cash RN  Outcome: Ongoing  Note: Patient free of falls. Call light within reach. Hourly rounding continues. Bed locked and in lowest position, bed rails 2/4 up, non skid footwear in use, bed alarm on, fall sign post.       Problem: Falls - Risk of:  Goal: Absence of physical injury  Description: Absence of physical injury  9/10/2020 2238 by Fernando Cash RN  Outcome: Ongoing  Note: No new signs of injury, will continue to assess. Problem: Airway Clearance - Ineffective  Goal: Achieve or maintain patent airway  9/10/2020 2238 by Fernando Cash RN  Outcome: Ongoing  Note: Pt. Remains on High Flow, lung sounds are clear to diminished, no c/o SOB or difficulty breathing, will continue to assess     Problem: Gas Exchange - Impaired  Goal: Absence of hypoxia  9/10/2020 2238 by Fernando Cash RN  Outcome: Ongoing  Note: Pt. Remains on high flow, and continuous O2 monitoring, weaning being done as tolerated. Problem: Gas Exchange - Impaired  Goal: Promote optimal lung function  9/10/2020 2238 by Fernando Cash RN  Outcome: Ongoing  Note: Pt. Lung sounds are clear to diminished, no c/o SOB even when up and walking. Will continue to assess     Problem: Breathing Pattern - Ineffective  Goal: Ability to achieve and maintain a regular respiratory rate  9/10/2020 2238 by Fernando Cash RN  Outcome: Ongoing  Note: Pt. Resp rate has been in the high teens to low 20s, unlabored with chest expansion symmetrical. Will continue to assess. Problem: Body Temperature -  Risk of, Imbalanced  Goal: Ability to maintain a body temperature within defined limits  9/10/2020 2238 by Fernando Cash RN  Outcome: Ongoing  Note: Pt. Remains afebrile this shift.      Problem: Isolation Precautions - Risk of Spread of Infection  Goal: Prevent transmission of infection  9/10/2020 2238 by Boogie Snyder SIERRA Soler  Outcome: Ongoing  Note: Pt. Remains in droplet plus precautions for COVID-19, will take out when appropriate. Problem: Skin Integrity:  Goal: Will show no infection signs and symptoms  Description: Will show no infection signs and symptoms  9/10/2020 2238 by Brando Guy RN  Outcome: Ongoing  Note: Ongoing assessment & interventions provided throughout shift. Skin assessments provided. Encouraging/assisting patient to turn as needed. Problem: Skin Integrity:  Goal: Absence of new skin breakdown  Description: Absence of new skin breakdown  9/10/2020 2238 by Brando Guy RN  Outcome: Ongoing  Note: No new signs of injury, will continue to assess. Problem: Discharge Planning:  Goal: Discharged to appropriate level of care  Description: Discharged to appropriate level of care  9/10/2020 2238 by Brando Guy RN  Outcome: Ongoing  Note: Pt remains in ICU for respiratory support. Will transfer to lower level of care when appropriate. Problem: Serum Glucose Level - Abnormal:  Goal: Ability to maintain appropriate glucose levels will improve  Description: Ability to maintain appropriate glucose levels will improve  9/10/2020 2238 by Brando Guy RN  Outcome: Ongoing  Note: Pt bs was 345 covered with lantas 50 units, and get 6 units of humalog nightly. Care plan reviewed with patient. Patient verbalize understanding of the plan of care and contribute to goal setting.

## 2020-09-11 NOTE — PROGRESS NOTES
[address] [address]  [facility] CVICU 4B  Occupational Therapy  Daily Note  Time:   Time In:   Time Out:   Timed Code Treatment Minutes: 30 Minutes  Minutes: 30          Date: 2020  Patient Name: Silvia Ambrocio,   Gender: male      Room: -05/005-A  MRN: [id number]  : 1970  (44 y.o.)  Referring Practitioner: Alli Grimes MD  Diagnosis: Covid-19  Additional Pertinent Hx: Per physician notes: The patient has history of obesity, obstructive sleep apnea, anxiety, depression, hypertension, hyperlipidemia, rheumatoid arthritis, type 2 diabetes mellitus, atrial fibrillation who was diagnosed with COVID pneumonia and admitted to [facility] last month. The patient was treated with Decadron, Remdesivir and azithromycin and then he was discharged on . The patient was brought to the emergency department via EMS today because he was found hypoxic in the nursing home. He was brought on nonrebreather mask, he was taken off oxygen for short period of time in the ER and he dropped to 81%. He was placed back on nonrebreather. The patient had low-grade fever there up to 100.3. His temperature in the emergency department was 99.5. He had some diarrhea. Repeat COVID test today is positive. Restrictions/Precautions:  Restrictions/Precautions: General Precautions, Fall Risk  Position Activity Restriction  Other position/activity restrictions: O2 monitoring- nasal cannula     SUBJECTIVE: Pleasant and cooperative. Pt asks to work at some arm exercises and also do his grooming. He did not report having any pain. PAIN: Denies. Pt only C/O fatigue. COGNITION: WFL    ADL:   Grooming: Contact Guard Assistance. Pt shaved himself while sitting with a regular razor and had help only with rinsing off his blade as he had several days of growth on his beard. BALANCE:  Sitting Balance:  Supervision.  UE exercises and grooming    ADDITIONAL ACTIVITIES:  Patient completed BUE

## 2020-09-11 NOTE — PROGRESS NOTES
CRITICAL CARE PROGRESS NOTE      Patient:  Ulysses Soria    Unit/Bed:4B-05/005-A  YOB: 1970  MRN: [id number]   PCP: Katja Garza DO  Date of Admission: 9/6/2020  Chief Complaint: COVID-19 infection    Assessment and Plan:    1. Acute hypoxic respiratory failure: Secondary to COVID -19 infection. Patient currently on high flow oxygen. Concern for possible sequelae of COVID-19 pneumonialike organizing pneumonia versus active pneumonia or superimposed bacterial pneumonia.  On arrival pro-Luciano was 0.37, 9/9/20 0. 18.  Continue duonebs every 4 hours as needed for shortness of breath.  Patient currently on high flow 40 L/min with an FiO2 of 40.  Chest Xray 9/10/20 reports: Suboptimal inflation of lungs. Borderline heart size. No effusion. Moderate mixed infiltrate scattered throughout both lungs, consistent with pneumonia. Overall appearance slightly improved from prior study. 2. COVID-19 pneumonia/infection: Patient was diagnosed over a month ago and treated with Decadron, 3-day course of remdesivir, and azithromycin.  Patient is currently requiring high flow oxygen, will continue to monitor his oxygen saturation.  Inflammatory markers are elevated, pro calcitonin remains low .  On arrival pro-Luciano was 0.37, 9/10/20 0. 19.  Strep pneumonia urine antigen negative, Legionella antigen negative, blood cultures negative x2 preliminary, VRE screen by PCR positive. Continue patient on Remdesivir.  Continue danazol 400 mg BID, start Decadron 6 mg daily for total of 3 doses. Due to extended half-life of Decadron, patient will continue to receive Decadron anti-inflammatory effects even after completing 3 doses, for a total of 6-8 days of anti- inflammatory effect (today will patient will receive last dose of Decadron). Continue start aspirin 81 mg daily for treatment and prevention microthrombosis which is noted to be common in COVID/ARDS conditions.   Acute lung injury secondary to COVID-19 will loss will be encouraged when appropriate  8. Anxiety/depression: Continue BuSpar 10 mg twice daily, Celexa 30 mg daily,Abilify 15 mg daily  9. History of hyperlipidemia: Continue Lipitor 40 mg nightly  10. History of hypertension: Continue Norvasc 5 mg daily, Lasix 40 mg daily  11. History of chronic gout: Continue allopurinol 500 mg daily  12. History of BPH: Continue Flomax 0.4 mg nightly  13. GI prophylaxis:   Protonix 40 mg daily  14. DVT prophylaxis: Patient already receiving anticoagulation for atrial fibrillation     INITIAL H AND P AND ICU COURSE:  75-year-old male was admitted to [facility] for shortness of breath.  Patient has a significant past medical history for obstructive sleep apnea which uses CPAP machine, obesity, anxiety, depression, hypertension, hyperlipidemia, rheumatoid arthritis, type 2 diabetes and atrial fibrillation. Patient was diagnosed with COVID19  pneumonia and admitted to [facility] last month. Candelaria Coffey was treated at that time with Decadron, Remdesivir and azithromycin patient was discharged on 8/12/2020.  9/6/2020 patient was brought back to the emergency room department via EMS because he was found to be hypoxic at [address] [place] [address] arrived to the emergency room on a nonrebreather, he was taken off oxygen for short period of time in the emergency room department and oxygen saturation dropped to 81%.  At that time patient was placed back on nonrebreather.  Patient had a low-grade fever of 100. 3.  Patient admits to diarrhea prior to coming to the emergency room department.  Repeat COVID test positive.      Patient reports that after previous admission to [facility] for COVID infection 8/7/20, he returned back to [facility] and was well for about 4 to 5 days.  Patient admits that his roommate at [facility] was sick when he went back patient and he started to develop productive cough with green-yellow thick sputum production.  Since arrival to [facility] [address] and treatment in the ICU patient has denied any cough or sputum production        Past Medical History: Vitamin D deficiency, obstructive sleep apnea uses CPAP at night, nonalcoholic steatohepatitis, morbid obesity, major depression, hypogonadism, essential hypertension, hyperlipidemia, history of osteomyelitis, history of alcohol abuse, heart failure with preserved ejection fraction, GERD, type 2 diabetes, chronic gout, CAD, BPH, atrial fibrillation, rheumatoid arthritis  Family History: Mother-heart disease with history of MI at 48years of age, .  Paternal aunt -lung cancer  Social History: History of alcohol abuse, lifetime non-smoker, denies substance abuse, , resides at [address] [place] [address] urethral meatus is sore, he also was noted his urine blood-tinged. Patient states he thinks it is due to his mason catheter being pulled on when he is being assisted in standing and moving. Patient also states his mason tube was taped tight to his leg. Mason was removed immediately and patient notes that urethral meatus is not as tender as it was before. Patient states today is the best he has felt since arrival, he admits he does continue to have mild chest pain with deep inspiration as well as cough.     All other systems reviewed negative    Scheduled Meds:   insulin lispro  0-18 Units Subcutaneous TID WC    insulin lispro  0-9 Units Subcutaneous Nightly    dexamethasone  6 mg Intravenous Daily    insulin glargine  50 Units Subcutaneous BID    aspirin  81 mg Oral Daily    docusate sodium  100 mg Oral BID    Vitamin D  1,000 Units Oral Daily    danazol  400 mg Oral BID    enoxaparin  1 mg/kg Subcutaneous BID    allopurinol  500 mg Oral Daily    amLODIPine  5 mg Oral Daily    ARIPiprazole  15 mg Oral Daily    atorvastatin  40 mg Oral Nightly    busPIRone  10 mg Oral BID    citalopram  30 mg Oral Daily    dilTIAZem  60 mg Oral BID    folic acid  1 mg Oral heart size. No effusions. Moderate mixed infiltrate scattered throughout both lungs, consistent with pneumonia. Overall appearance improved from prior study.  Chest x-ray 9/9/20 reports: Borderline heart size.  No effusion.  Moderate least severe mixed infiltrate scattered throughout both lungs, consistent with pneumonia.  Slightly improved since prior study   CXR 9/6/2020 reports: Soft infiltration of lungs.  Relative severe mixed infiltrate scattered throughout both lungs consistent with history of COVID-19 infection.  no effusion   Chest CT without contrast 9/6/2020 reports: Extensive patchy infiltrates in consolidation throughout both lung fields which are most consolidated within bilateral perihilar region. Ricka Backers is no pleural effusion    Meets Continued ICU Level Care Criteria:    [x] Yes   [] No - Transfer Planned to listed location:  [] HOSPITALIST CONTACTED-      Case and plan discussed with Dr. Emili Arredondo. Electronically signed by Patti Manzanares DO  CRITICAL CARE SPECIALIST  Patient seen by me. Case discussed with resident physician. Remove Dimas catheter. On danazol for events of bradykinin storm. Chest x-ray suggests early improvement in infiltrates. Electronically signed by Blanche Collet Lucianne Lips MD.

## 2020-09-11 NOTE — CARE COORDINATION
9/11/20, 12:49 PM EDT    DISCHARGE ON GOING [address] day: 5  Location: -05/005-A Reason for admit: COVID-19 virus infection [U07.1]   Procedure:   9/6 CT Chest: There are extensive patchy infiltrates and consolidation throughout both lung fields which are most consolidated within the bilateral perihilar region. There is no pleural effusion  Treatment Plan of Care: Dimas removed today. Remains on vapotherm 35% FIO2, 40L, with sats 97%. Afebrile. NSR. Ox4. Follows commands. PT/OT. +COVID 19. +MRSA nares. +VRE rectum. Telemetry, I&O, daily weight, SCDs. Allopurinol, norvasc, prn xanax, abilify, asa, lipitor, buspar, IV cefepime, celexa, danazol, IV decadron 6 mg daily, cardizem, lovenox bid, folic acid, po lasix daily, neurontin, hydralazine, lantus bid, SSI ACHS, protonix, flomax, vit d. Alb 3.4, wbc 13, hgb 13.5. Barriers to Discharge: on vapotherm  PCP: Saleem Rico DO  Readmission Risk Score: 30%  Patient Goals/Plan/Treatment Preferences: From [facility]. Plan home to his new apartment with new [employer]  vs return to [facility] - would require precert. SW on case.     Obtained order to wean vapotherm aggressively to maintain O2 sats 92% or greater.

## 2020-09-12 LAB
ALBUMIN SERPL-MCNC: 3 G/DL (ref 3.5–5.1)
ALP BLD-CCNC: 69 U/L (ref 38–126)
ALT SERPL-CCNC: 61 U/L (ref 11–66)
ANION GAP SERPL CALCULATED.3IONS-SCNC: 9 MEQ/L (ref 8–16)
ANISOCYTOSIS: PRESENT
AST SERPL-CCNC: 31 U/L (ref 5–40)
BASOPHILS # BLD: 0.6 %
BASOPHILS ABSOLUTE: 0.1 THOU/MM3 (ref 0–0.1)
BILIRUB SERPL-MCNC: 0.2 MG/DL (ref 0.3–1.2)
BLOOD CULTURE, ROUTINE: NORMAL
BLOOD CULTURE, ROUTINE: NORMAL
BUN BLDV-MCNC: 22 MG/DL (ref 7–22)
CALCIUM SERPL-MCNC: 9.4 MG/DL (ref 8.5–10.5)
CHLORIDE BLD-SCNC: 99 MEQ/L (ref 98–111)
CO2: 30 MEQ/L (ref 23–33)
CREAT SERPL-MCNC: 0.9 MG/DL (ref 0.4–1.2)
EOSINOPHIL # BLD: 0 %
EOSINOPHILS ABSOLUTE: 0 THOU/MM3 (ref 0–0.4)
ERYTHROCYTE [DISTWIDTH] IN BLOOD BY AUTOMATED COUNT: 15.6 % (ref 11.5–14.5)
ERYTHROCYTE [DISTWIDTH] IN BLOOD BY AUTOMATED COUNT: 51.6 FL (ref 35–45)
GFR SERPL CREATININE-BSD FRML MDRD: > 90 ML/MIN/1.73M2
GLUCOSE BLD-MCNC: 225 MG/DL (ref 70–108)
GLUCOSE BLD-MCNC: 237 MG/DL (ref 70–108)
GLUCOSE BLD-MCNC: 277 MG/DL (ref 70–108)
GLUCOSE BLD-MCNC: 299 MG/DL (ref 70–108)
GLUCOSE BLD-MCNC: 343 MG/DL (ref 70–108)
HCT VFR BLD CALC: 42.3 % (ref 42–52)
HEMOGLOBIN: 13.6 GM/DL (ref 14–18)
IMMATURE GRANS (ABS): 0.96 THOU/MM3 (ref 0–0.07)
IMMATURE GRANULOCYTES: 6.8 %
LD: 414 U/L (ref 100–190)
LYMPHOCYTES # BLD: 8.5 %
LYMPHOCYTES ABSOLUTE: 1.2 THOU/MM3 (ref 1–4.8)
MAGNESIUM: 2.2 MG/DL (ref 1.6–2.4)
MCH RBC QN AUTO: 29.6 PG (ref 26–33)
MCHC RBC AUTO-ENTMCNC: 32.2 GM/DL (ref 32.2–35.5)
MCV RBC AUTO: 92 FL (ref 80–94)
MONOCYTES # BLD: 7.4 %
MONOCYTES ABSOLUTE: 1 THOU/MM3 (ref 0.4–1.3)
NUCLEATED RED BLOOD CELLS: 0 /100 WBC
PLATELET # BLD: 269 THOU/MM3 (ref 130–400)
PMV BLD AUTO: 11.2 FL (ref 9.4–12.4)
POTASSIUM SERPL-SCNC: 4.7 MEQ/L (ref 3.5–5.2)
RBC # BLD: 4.6 MILL/MM3 (ref 4.7–6.1)
SCAN OF BLOOD SMEAR: NORMAL
SEG NEUTROPHILS: 76.7 %
SEGMENTED NEUTROPHILS ABSOLUTE COUNT: 10.8 THOU/MM3 (ref 1.8–7.7)
SODIUM BLD-SCNC: 138 MEQ/L (ref 135–145)
TOTAL PROTEIN: 6.3 G/DL (ref 6.1–8)
WBC # BLD: 14.1 THOU/MM3 (ref 4.8–10.8)

## 2020-09-12 PROCEDURE — 6370000000 HC RX 637 (ALT 250 FOR IP): Performed by: STUDENT IN AN ORGANIZED HEALTH CARE EDUCATION/TRAINING PROGRAM

## 2020-09-12 PROCEDURE — 94761 N-INVAS EAR/PLS OXIMETRY MLT: CPT

## 2020-09-12 PROCEDURE — 82948 REAGENT STRIP/BLOOD GLUCOSE: CPT

## 2020-09-12 PROCEDURE — 6360000002 HC RX W HCPCS: Performed by: INTERNAL MEDICINE

## 2020-09-12 PROCEDURE — 2580000003 HC RX 258: Performed by: INTERNAL MEDICINE

## 2020-09-12 PROCEDURE — 80053 COMPREHEN METABOLIC PANEL: CPT

## 2020-09-12 PROCEDURE — 36415 COLL VENOUS BLD VENIPUNCTURE: CPT

## 2020-09-12 PROCEDURE — 6370000000 HC RX 637 (ALT 250 FOR IP): Performed by: NURSE PRACTITIONER

## 2020-09-12 PROCEDURE — 6360000002 HC RX W HCPCS: Performed by: STUDENT IN AN ORGANIZED HEALTH CARE EDUCATION/TRAINING PROGRAM

## 2020-09-12 PROCEDURE — 99291 CRITICAL CARE FIRST HOUR: CPT | Performed by: INTERNAL MEDICINE

## 2020-09-12 PROCEDURE — 85025 COMPLETE CBC W/AUTO DIFF WBC: CPT

## 2020-09-12 PROCEDURE — 6370000000 HC RX 637 (ALT 250 FOR IP): Performed by: INTERNAL MEDICINE

## 2020-09-12 PROCEDURE — 83735 ASSAY OF MAGNESIUM: CPT

## 2020-09-12 PROCEDURE — 2060000000 HC ICU INTERMEDIATE R&B

## 2020-09-12 PROCEDURE — 83615 LACTATE (LD) (LDH) ENZYME: CPT

## 2020-09-12 RX ORDER — ALPRAZOLAM 0.25 MG/1
0.25 TABLET ORAL NIGHTLY PRN
Status: DISCONTINUED | OUTPATIENT
Start: 2020-09-12 | End: 2020-09-16 | Stop reason: HOSPADM

## 2020-09-12 RX ADMIN — Medication 1000 UNITS: at 09:11

## 2020-09-12 RX ADMIN — ENOXAPARIN SODIUM 135 MG: 150 INJECTION SUBCUTANEOUS at 09:16

## 2020-09-12 RX ADMIN — AMLODIPINE BESYLATE 5 MG: 5 TABLET ORAL at 09:21

## 2020-09-12 RX ADMIN — BUSPIRONE HYDROCHLORIDE 10 MG: 10 TABLET ORAL at 21:09

## 2020-09-12 RX ADMIN — ATORVASTATIN CALCIUM 40 MG: 40 TABLET, FILM COATED ORAL at 21:09

## 2020-09-12 RX ADMIN — DANAZOL 400 MG: 200 CAPSULE ORAL at 21:10

## 2020-09-12 RX ADMIN — DILTIAZEM HYDROCHLORIDE 60 MG: 60 TABLET, FILM COATED ORAL at 21:08

## 2020-09-12 RX ADMIN — ENOXAPARIN SODIUM 135 MG: 150 INJECTION SUBCUTANEOUS at 21:11

## 2020-09-12 RX ADMIN — HYDRALAZINE HYDROCHLORIDE 50 MG: 50 TABLET, FILM COATED ORAL at 21:09

## 2020-09-12 RX ADMIN — GABAPENTIN 800 MG: 400 CAPSULE ORAL at 21:08

## 2020-09-12 RX ADMIN — DILTIAZEM HYDROCHLORIDE 60 MG: 60 TABLET, FILM COATED ORAL at 09:22

## 2020-09-12 RX ADMIN — CEFEPIME HYDROCHLORIDE 2 G: 2 INJECTION, POWDER, FOR SOLUTION INTRAVENOUS at 16:43

## 2020-09-12 RX ADMIN — FOLIC ACID 1 MG: 1 TABLET ORAL at 09:12

## 2020-09-12 RX ADMIN — DEXAMETHASONE SODIUM PHOSPHATE 6 MG: 4 INJECTION, SOLUTION INTRAMUSCULAR; INTRAVENOUS at 09:14

## 2020-09-12 RX ADMIN — DANAZOL 400 MG: 200 CAPSULE ORAL at 09:11

## 2020-09-12 RX ADMIN — GABAPENTIN 800 MG: 400 CAPSULE ORAL at 09:12

## 2020-09-12 RX ADMIN — CEFEPIME HYDROCHLORIDE 2 G: 2 INJECTION, POWDER, FOR SOLUTION INTRAVENOUS at 04:03

## 2020-09-12 RX ADMIN — FUROSEMIDE 40 MG: 40 TABLET ORAL at 09:11

## 2020-09-12 RX ADMIN — DOCUSATE SODIUM 100 MG: 100 CAPSULE, LIQUID FILLED ORAL at 21:10

## 2020-09-12 RX ADMIN — ASPIRIN 81 MG: 81 TABLET, CHEWABLE ORAL at 09:10

## 2020-09-12 RX ADMIN — BUSPIRONE HYDROCHLORIDE 10 MG: 10 TABLET ORAL at 09:13

## 2020-09-12 RX ADMIN — DOCUSATE SODIUM 100 MG: 100 CAPSULE, LIQUID FILLED ORAL at 09:10

## 2020-09-12 RX ADMIN — INSULIN GLARGINE 50 UNITS: 100 INJECTION, SOLUTION SUBCUTANEOUS at 08:54

## 2020-09-12 RX ADMIN — ARIPIPRAZOLE 15 MG: 15 TABLET ORAL at 09:13

## 2020-09-12 RX ADMIN — ALLOPURINOL 500 MG: 300 TABLET ORAL at 09:13

## 2020-09-12 RX ADMIN — CITALOPRAM 30 MG: 20 TABLET, FILM COATED ORAL at 09:12

## 2020-09-12 RX ADMIN — ALPRAZOLAM 0.25 MG: 0.25 TABLET ORAL at 23:41

## 2020-09-12 RX ADMIN — Medication 10 ML: at 09:22

## 2020-09-12 RX ADMIN — INSULIN GLARGINE 50 UNITS: 100 INJECTION, SOLUTION SUBCUTANEOUS at 21:13

## 2020-09-12 RX ADMIN — HYDRALAZINE HYDROCHLORIDE 50 MG: 50 TABLET, FILM COATED ORAL at 09:22

## 2020-09-12 RX ADMIN — Medication 10 ML: at 21:10

## 2020-09-12 RX ADMIN — PANTOPRAZOLE SODIUM 40 MG: 40 TABLET, DELAYED RELEASE ORAL at 09:11

## 2020-09-12 RX ADMIN — TAMSULOSIN HYDROCHLORIDE 0.4 MG: 0.4 CAPSULE ORAL at 21:08

## 2020-09-12 ASSESSMENT — PAIN SCALES - GENERAL
PAINLEVEL_OUTOF10: 0

## 2020-09-12 NOTE — PROGRESS NOTES
CRITICAL CARE PROGRESS NOTE      Patient:  Glen Garcia    Unit/Bed:4B-05/005-A  YOB: 1970  MRN: [id number]   PCP: Mackey Gitelman, DO  Date of Admission: 9/6/2020  Chief Complaint:-COVID-19 infection    Assessment and Plan:    1. Acute hypoxic respiratory failure: Secondary to COVID -19 infection. Patient currently on high flow oxygen. Concern for possible sequelae of COVID-19 pneumonialike organizing pneumonia versus active pneumonia or superimposed bacterial pneumonia.  On arrival pro-Luciano was 0.37, 9/9/20 0. 18.  Continue duonebs every 4 hours as needed for shortness of breath.  Patient currently on nasal cannula 3.5 L and SPO2 92% and respiratory rate of 12. Chest Xray 9/10/20 reports: Suboptimal inflation of lungs.  Borderline heart size.  No effusion.  Moderate mixed infiltrate scattered throughout both lungs, consistent with pneumonia.  Overall appearance slightly improved from prior study. 2. COVID-19 pneumonia/infection: Patient was diagnosed over a month ago and treated with Decadron, 3-day course of remdesivir, and azithromycin.  Patient is currently requiring high flow oxygen, will continue to monitor his oxygen saturation.  Inflammatory markers are elevated, pro calcitonin remains low .  On arrival pro-Luciano was 0.37, 9/10/20 0. 19.  Strep pneumonia urine antigen negative, Legionella antigen negative, blood cultures negative x2 preliminary, VRE screen by PCR positive.  Continue patient on Remdesivir.  Continue danazol 400 mg BID, start Decadron 6 mg daily for total of 3 doses.  Due to extended half-life of Decadron, patient will continue to receive Decadron anti-inflammatory effects even after completing 3 doses, for a total of 6-8 days of anti- inflammatory effect (today will patient will receive last dose of Decadron).  Continue start aspirin 81 mg daily for treatment and prevention microthrombosis which is noted to be common in COVID/ARDS conditions.  Acute lung injury secondary to COVID-19 will continue to closely monitor patient for decompensation that would require mechanical ventilation. 1. History of vitamin D deficiency: Vitamin D, 25 Hydroxy 40 ng/ml 9/9/20. There is evidence to support that improved with COVID19 infections vitamin D helps breakdown bradykinin and in turn reducing sequelae of COVID19. Continue vitamin D 1000 units daily supplementation   2. Atrial fibrillation: Rate controlled - Cardizem 60mg  BID.  Patient was previously on Eliquis however due to weight being greater than 120 kg. Lovenox 135 mg subcutaneous BID is indicated as treatment of choice for anticoagulation   3. Obstructive sleep apnea: Patient reports wearing CPAP however he is unaware of the settings at home.  Patient placed on BiPAP at night and as needed throughout the day  4. Uncontrolled diabetes mellitus type 2 with hyperglycemia: Uncontrolled likely due to steroid use. Glucose continues to increase. Patient expresses he is very hungry all the time. Diet was increased so he receives an ample between breakfast and lunch, orange between lunch and dinner, and half apple in the evening. Continue with carb control diet. Continue Lantus 50 units BID,icnrease to high does ISS, gabapentin 800 mg twice daily.    5. Chronic diastolic congestive heart failure: Preserved ejection fraction-last echo was completed 10/10/2019 reports: Normal left ventricle size and systolic function. Ejection fraction was estimated at 55 to 60 %. There were no regional left ventricular wall motion abnormalities and wall thickness was within normal limits. The left atrium is Mildly dilated.  We will continue Lasix 40 mg daily, Norvasc 5 mg daily, Cardizem 60 mg twice daily  6. History of rheumatoid arthritis: Rheumatoid factor positive. Percocet 5-325 mg every 4 hours for moderate to severe pain, however patient states he has not had a bowel movement in greater than 4 days started Colace 100 mg twice daily  7.  Morbid obesity: BMI is 39. 81.  Weight loss will be encouraged when appropriate  8. Anxiety/depression: Continue BuSpar 10 mg twice daily, Celexa 30 mg daily,Abilify 15 mg daily  9. History of hyperlipidemia: Continue Lipitor 40 mg nightly  10. History of hypertension: Continue Norvasc 5 mg daily, Lasix 40 mg daily  11. History of chronic gout: Continue allopurinol 500 mg daily  12. History of BPH: Continue Flomax 0.4 mg nightly  13. GI prophylaxis:   Protonix 40 mg daily  14. DVT prophylaxis: Patient already receiving anticoagulation for atrial fibrillation     INITIAL H AND P AND ICU COURSE:  42-year-old male was admitted to [facility] for shortness of breath.  Patient has a significant past medical history for obstructive sleep apnea which uses CPAP machine, obesity, anxiety, depression, hypertension, hyperlipidemia, rheumatoid arthritis, type 2 diabetes and atrial fibrillation. Patient was diagnosed with COVID19  pneumonia and admitted to [facility] last month. Pal Whitfield was treated at that time with Decadron, Remdesivir and azithromycin patient was discharged on 8/12/2020.  9/6/2020 patient was brought back to the emergency room department via EMS because he was found to be hypoxic at [address] [place] [address] arrived to the emergency room on a nonrebreather, he was taken off oxygen for short period of time in the emergency room department and oxygen saturation dropped to 81%.  At that time patient was placed back on nonrebreather.  Patient had a low-grade fever of 100. 3.  Patient admits to diarrhea prior to coming to the emergency room department.  Repeat COVID test positive.      Patient reports that after previous admission to [facility] for COVID infection 8/7/20, he returned back to [facility] and was well for about 4 to 5 days.  Patient admits that his roommate at [facility] was sick when he went back patient and he started to develop productive cough with green-yellow thick sputum production.  Since arrival to [facility] and treatment in the ICU patient has denied any cough or sputum production        Past Medical History: Vitamin D deficiency, obstructive sleep apnea uses CPAP at night, nonalcoholic steatohepatitis, morbid obesity, major depression, hypogonadism, essential hypertension, hyperlipidemia, history of osteomyelitis, history of alcohol abuse, heart failure with preserved ejection fraction, GERD, type 2 diabetes, chronic gout, CAD, BPH, atrial fibrillation, rheumatoid arthritis  Family History: Mother-heart disease with history of MI at 48years of age, .  Paternal aunt -lung cancer  Social History: History of alcohol abuse, lifetime non-smoker, denies substance abuse, , resides at [place] [address]    Patient states he is still experiencing a burning pain sensation with cough and deep inhalation in the midsternal area. Patient states he slept much better last night after receiving sleep medication.   Patient eager to go home     All other systems reviewed negative       Scheduled Meds:   insulin lispro  0-18 Units Subcutaneous TID WC    insulin lispro  0-9 Units Subcutaneous Nightly    insulin glargine  50 Units Subcutaneous BID    aspirin  81 mg Oral Daily    docusate sodium  100 mg Oral BID    Vitamin D  1,000 Units Oral Daily    danazol  400 mg Oral BID    enoxaparin  1 mg/kg Subcutaneous BID    allopurinol  500 mg Oral Daily    amLODIPine  5 mg Oral Daily    ARIPiprazole  15 mg Oral Daily    atorvastatin  40 mg Oral Nightly    busPIRone  10 mg Oral BID    citalopram  30 mg Oral Daily    dilTIAZem  60 mg Oral BID    folic acid  1 mg Oral Daily    furosemide  40 mg Oral Daily    gabapentin  800 mg Oral BID    hydrALAZINE  50 mg Oral BID    pantoprazole  40 mg Oral Daily    tamsulosin  0.4 mg Oral Nightly    sodium chloride flush  10 mL Intravenous 2 times per day    cefepime  2 g Intravenous Q12H    polyethylene glycol  17 g Oral Every Other Day     Continuous Infusions:   dextrose         PHYSICAL EXAMINATION:  T: 98.1.  P: 68. RR: 16. B/P: 127/93. O2 Sat: 92.  I/O: 440.6/1450  Body mass index is 40.02 kg/m². GCS:  15  General: Subacute ill obese male asleep in chair when I entered the room  HEENT:  normocephalic and atraumatic. No scleral icterus. PERR conjunctival injection improved  Neck: supple. No Thyromegaly. Lungs:  No retractions. Diminished breath sounds bilaterally, intermittent wheezing noted. On 3.5 L/min nasal cannula. In no acute respiratory distress  Cardiac: RRR. No JVD. Abdomen: soft. Nontender. Bowel sounds present  Extremities:  No clubbing, cyanosis, or edema x 4. Vasculature: capillary refill < 3 seconds. Palpable dorsalis pedis pulses. Skin:  warm and dry. Psych:  Alert and oriented x3. Affect appropriate  Lymph:  No supraclavicular adenopathy. Neurologic:  No focal deficit. No seizures. Data: (All radiographs, tracings, PFTs, and imaging are personally viewed and interpreted unless otherwise noted).  Sodium 138, potassium 4.7, chloride 99, CO2 30, BUN 22, creatinine 0.9, magnesium 2.2, calcium 9.4, total protein 6.3   WBC 14.1, hemoglobin 13.6, hematocrit 24.3       Albumin 3.0, alk phos 69, ALT 61, AST 31, bilirubin 0.2   Telemetry shows normal sinus rhythm  · EKG 12-lead 9/11/20 reports: Sinus bradycardia. Otherwise normal ECG When compared with ECG of 06-SEP-2020 11:17,Ventricular rate has decreased BY  45 BPM ST now depressed in Inferior leads ST elevation now present in Lateral leadsT wave inversion now evident in Inferior leads  · Chest x-ray 9/10/2020 reports: Suboptimal inflation of lungs.  Borderline heart size.  No effusions.  Moderate mixed infiltrate scattered throughout both lungs, consistent with pneumonia.  Overall appearance improved from prior study.   · Chest x-ray 9/9/20 reports: Borderline heart size.  No effusion.  Moderate least severe mixed infiltrate scattered throughout both lungs, consistent with pneumonia.  Slightly improved since prior study  · CXR 9/6/2020 reports: Soft infiltration of lungs.  Relative severe mixed infiltrate scattered throughout both lungs consistent with history of COVID-19 infection.  no effusion  · Chest CT without contrast 9/6/2020 reports: Extensive patchy infiltrates in consolidation throughout both lung fields which are most consolidated within bilateral         Meets Continued ICU Level Care Criteria:    [x] Yes   [] No - Transfer Planned to listed location:  [] HOSPITALIST CONTACTED-      Case and plan discussed with Dr. Emerson Schaumann.         Electronically signed by Froy Monsalve DO  CRITICAL CARE SPECIALIST

## 2020-09-12 NOTE — PLAN OF CARE
Problem: Body Temperature -  Risk of, Imbalanced  Goal: Ability to maintain a body temperature within defined limits  9/12/2020 1844 by Livia Ellington RN  Outcome: Met This Shift  Note: Afebrile today  9/12/2020 1812 by Livia Ellington RN  Outcome: Met This Shift  Note: Afebrile     Problem: Body Temperature -  Risk of, Imbalanced  Goal: Will regain or maintain usual level of consciousness  9/12/2020 1844 by Livia Ellington RN  Outcome: Met This Shift  Note: Pt is alert and oriented  9/12/2020 1812 by Livia Ellington RN  Outcome: Met This Shift  Note: Pt is alert and oriented     Problem: Cardiovascular  Goal: Hemodynamic stability  Outcome: Met This Shift  Note: Vital signs stable     Problem:   Goal: Adequate urinary output  Outcome: Met This Shift  Note: Adequate intake and output. Problem: Nutrition  Goal: Optimal nutrition therapy  Outcome: Met This Shift  Note: Pt has good po intake     Problem: Musculor/Skeletal Functional Status  Goal: Absence of falls  Outcome: Met This Shift  Note: NO fall this shift. Pt is alert and oriented and uses call light appropriately. Frequent checks and hourly rounds to assess needs. Chair and bed alarms on     Problem: Airway Clearance - Ineffective  Goal: Achieve or maintain patent airway  9/12/2020 1844 by Livia Ellington RN  Outcome: Ongoing  Note: Lung sounds with fine crackles in bilateral bases. Encourage coughing and deep breathing  9/12/2020 1812 by Livia Ellington RN  Outcome: Ongoing  Note: Pt has fine crackles in lung bases bilaterally. Encourage coughing and deep breathing and mobility as tolerates     Problem: Gas Exchange - Impaired  Goal: Absence of hypoxia  9/12/2020 1844 by Livia Ellington RN  Outcome: Ongoing  Note: Slowly weaning oxygen.  On 3L Nc  9/12/2020 1812 by Livia Ellington RN  Outcome: Met This Shift     Problem: Gas Exchange - Impaired  Goal: Promote optimal lung function  9/12/2020 1844 by Livia Ellington RN  Outcome: Ongoing  Note: Encourage coughing and deep breathing and activity as tolerates. 9/12/2020 1812 by Kim Reynoso RN  Outcome: Ongoing     Problem: Breathing Pattern - Ineffective  Goal: Ability to achieve and maintain a regular respiratory rate  9/12/2020 1844 by Kim Reynoso RN  Outcome: Ongoing  9/12/2020 1812 by iKm Reynoso RN  Outcome: Ongoing     Problem: Isolation Precautions - Risk of Spread of Infection  Goal: Prevent transmission of infection  9/12/2020 1844 by Kim Reynoso RN  Outcome: Ongoing  Note: Continue droplet plus precautions for COVID  9/12/2020 1812 by Kim Reynoso RN  Outcome: Ongoing  Note: Continue droplet plus precautions for COVID     Problem: Discharge Planning:  Goal: Discharged to appropriate level of care  Description: Discharged to appropriate level of care  Outcome: Ongoing  Note: Plan discharge probably Monday.  to assist with discharge needs. Problem: Serum Glucose Level - Abnormal:  Goal: Ability to maintain appropriate glucose levels will improve  Description: Ability to maintain appropriate glucose levels will improve  Outcome: Ongoing  Note: Glucose levels remain high. Continue to monitor and cover with sliding scale insulin and lantus as ordered     Problem: Pain Control  Goal: Maintain pain level at or below patient's acceptable level (or 5 if patient is unable to determine acceptable level)  9/12/2020 1844 by Kim Reynoso RN  Outcome: Ongoing  Note: Denies pain, but reassess and medicate if needed  9/12/2020 1812 by Kim Reynoso RN  Outcome: Ongoing  Note: Pt denies pain, but reassess and medicate if needed     Problem: Cardiovascular  Goal: No DVT, peripheral vascular complications  Outcome: Ongoing  Note: No sign of DVT.  Continue lovenox as ordered     Problem: Respiratory  Goal: No pulmonary complications  Outcome: Ongoing     Problem: Respiratory  Goal: O2 Sat > 90%  Outcome: Ongoing     Problem: Respiratory  Goal: Supplemental O2 requirements decreased  Outcome: Ongoing     Problem: GI  Goal: No bowel complications  Outcome: Ongoing  Note: States bowels have been moving well. Continue to monitor and give stool softeners as needed     Problem: Skin Integrity/Risk  Goal: No skin breakdown during hospitalization  Outcome: Ongoing  Note: Pt is alert and able to move self around. Encourage and assist with repositioning every 2 hours and PRN    Care plan reviewed with patient. Patient verbalizes understanding of the plan of care and contribute to goal setting.

## 2020-09-12 NOTE — FLOWSHEET NOTE
attempted to reach the pt via his room phone and his cell phone. There was no answer.  called the phone number listed as the pt's emergency contact. Pt  is a  at the pt's [employer]:  Merline Boor stated he speaks to the pt multiple times a day, and he keeps the pt \"prayed over. \"  Payton Syed was grateful for the 's contact and stated he would let the pt know I had contacted him. Spiritual care to continue to offer support and encouragement. 09/12/20 1253   Encounter Summary   Services provided to:   (friend)   Referral/Consult From: [facility]; [facility]   [facility]   ([address] )   Contact [facility] Completed   Continue Visiting Yes  (9/12 f)   Complexity of Encounter Moderate   Length of Encounter 15 minutes   Routine   Type Initial   Assessment Calm; Approachable   Intervention Active listening;Sustaining presence/ [facility]   Outcome Expressed gratitude;Engaged in conversation

## 2020-09-12 NOTE — PLAN OF CARE
Problem: Airway Clearance - Ineffective  Goal: Achieve or maintain patent airway  9/12/2020 0014 by Jyoti Parham RN  Outcome: Ongoing  Note: Patient able to maintain a patent airway. 9/11/2020 1950 by Melvina Bagley RN  Outcome: Met This Shift  Note: Able to wean of high vivek O2 to nasal cannula. Has a strong cough on command. Non congested. Problem: Gas Exchange - Impaired  Goal: Absence of hypoxia  9/12/2020 0014 by Jyoti Parham RN  Outcome: Ongoing  Note: No signs or hypoxia at rest. Patient's SPO2 drops into the 80's with ambulation, but he does recover well. 9/11/2020 1950 by Melvina Bagley RN  Outcome: Ongoing  Note: Weaned off high vivek O2 to nasal cannula. Saturation on 4L/NC is . Does still have some BENJAMIN and saturation dips to low 90S with activity  Goal: Promote optimal lung function  9/12/2020 0014 by Jyoti Parham RN  Outcome: Ongoing  Note: Encouraging the patient to cough and deep breath. 9/11/2020 1950 by Melvina Bagley RN  Outcome: Ongoing     Problem: Breathing Pattern - Ineffective  Goal: Ability to achieve and maintain a regular respiratory rate  9/12/2020 0014 by Jyoti Parham RN  Outcome: Ongoing  Note: Patient has a regular respiratory rate and effort. 9/11/2020 1950 by Melvina Bagley RN  Note: Resp easy and regular. BENJAMIN after doing several activities for PT     Problem: Body Temperature -  Risk of, Imbalanced  Goal: Ability to maintain a body temperature within defined limits  9/12/2020 0014 by Jyoti Parham RN  Outcome: Ongoing  Note: Patient has remained afebrile tonight.    9/11/2020 1950 by Melvina Bagley RN  Outcome: Met This Shift  Goal: Will regain or maintain usual level of consciousness  9/12/2020 0014 by Jyoti Parham RN  Outcome: Ongoing  Note: Patient alert and oriented x4.   9/11/2020 1950 by Melvina Bagley RN  Outcome: Met This Shift  Note: Alert and oriented     Problem: Isolation Precautions - Risk of Spread of Infection  Goal: Prevent transmission of infection  9/12/2020 0014 by Monica Durand RN  Outcome: Ongoing  Note: Patient is in contact isolation for MRSA & VRE and droplet + for COVID 19.   9/11/2020 1950 by Austin Aguirre RN  Outcome: Met This Shift     Problem: Discharge Planning:  Goal: Discharged to appropriate level of care  Description: Discharged to appropriate level of care  9/12/2020 0014 by Monica Durand RN  Outcome: Ongoing  Note: Patient from [facility] but will be going home with home health and home choice. 9/11/2020 1950 by Austin Aguirre RN  Outcome: Ongoing  Note: Pts plan is to return to his own apartment with home health assist     Problem: Serum Glucose Level - Abnormal:  Goal: Ability to maintain appropriate glucose levels will improve  Description: Ability to maintain appropriate glucose levels will improve  9/12/2020 0014 by Monica Durand RN  Outcome: Ongoing  Note: Checking the patient's blood sugar before meals and at bedtime. 9/11/2020 1950 by Austin Aguirre RN  Outcome: Ongoing  Note: Blood sugars 195-326. On lantus BID that was increased today to 50 units. Also on sliding scale. Problem: Pain Control  Goal: Maintain pain level at or below patient's acceptable level (or 5 if patient is unable to determine acceptable level)  Outcome: Ongoing  Note: Pain Assessment: 0-10  Pain Level: 0   Patient's Stated Pain Goal: No pain   Is pain goal met at this time? Yes     Non-Pharmaceutical Pain Intervention(s): Repositioned     Problem: Cardiovascular  Goal: No DVT, peripheral vascular complications  Outcome: Ongoing  Note: No signs or symptoms of DVT. Patient getting Lovenox BID. Goal: Hemodynamic stability  Outcome: Ongoing  Note: Vital signs have remained stable and within normal limits. Vitals being monitored every 4 hours and as needed. Continuous cardiac monitor in place. Heart rhythm is NSR.       Problem: Respiratory  Goal: No pulmonary

## 2020-09-13 LAB
ALBUMIN SERPL-MCNC: 3.2 G/DL (ref 3.5–5.1)
ALP BLD-CCNC: 64 U/L (ref 38–126)
ALT SERPL-CCNC: 50 U/L (ref 11–66)
ANION GAP SERPL CALCULATED.3IONS-SCNC: 9 MEQ/L (ref 8–16)
ANISOCYTOSIS: PRESENT
AST SERPL-CCNC: 25 U/L (ref 5–40)
BASOPHILIA: ABNORMAL
BASOPHILS # BLD: 0.7 %
BASOPHILS ABSOLUTE: 0.1 THOU/MM3 (ref 0–0.1)
BILIRUB SERPL-MCNC: 0.2 MG/DL (ref 0.3–1.2)
BUN BLDV-MCNC: 26 MG/DL (ref 7–22)
CALCIUM SERPL-MCNC: 9.5 MG/DL (ref 8.5–10.5)
CHLORIDE BLD-SCNC: 99 MEQ/L (ref 98–111)
CO2: 29 MEQ/L (ref 23–33)
CREAT SERPL-MCNC: 0.9 MG/DL (ref 0.4–1.2)
EOSINOPHIL # BLD: 0.1 %
EOSINOPHILS ABSOLUTE: 0 THOU/MM3 (ref 0–0.4)
ERYTHROCYTE [DISTWIDTH] IN BLOOD BY AUTOMATED COUNT: 15.8 % (ref 11.5–14.5)
ERYTHROCYTE [DISTWIDTH] IN BLOOD BY AUTOMATED COUNT: 51.8 FL (ref 35–45)
GFR SERPL CREATININE-BSD FRML MDRD: > 90 ML/MIN/1.73M2
GLUCOSE BLD-MCNC: 206 MG/DL (ref 70–108)
GLUCOSE BLD-MCNC: 231 MG/DL (ref 70–108)
GLUCOSE BLD-MCNC: 251 MG/DL (ref 70–108)
GLUCOSE BLD-MCNC: 277 MG/DL (ref 70–108)
GLUCOSE BLD-MCNC: 318 MG/DL (ref 70–108)
HCT VFR BLD CALC: 42.8 % (ref 42–52)
HEMOGLOBIN: 13.9 GM/DL (ref 14–18)
IMMATURE GRANS (ABS): 1.32 THOU/MM3 (ref 0–0.07)
IMMATURE GRANULOCYTES: 7.9 %
LD: 419 U/L (ref 100–190)
LYMPHOCYTES # BLD: 8.4 %
LYMPHOCYTES ABSOLUTE: 1.4 THOU/MM3 (ref 1–4.8)
MAGNESIUM: 2.1 MG/DL (ref 1.6–2.4)
MCH RBC QN AUTO: 29.8 PG (ref 26–33)
MCHC RBC AUTO-ENTMCNC: 32.5 GM/DL (ref 32.2–35.5)
MCV RBC AUTO: 91.6 FL (ref 80–94)
MONOCYTES # BLD: 6.9 %
MONOCYTES ABSOLUTE: 1.2 THOU/MM3 (ref 0.4–1.3)
NUCLEATED RED BLOOD CELLS: 1 /100 WBC
PLATELET # BLD: 327 THOU/MM3 (ref 130–400)
PMV BLD AUTO: 11.2 FL (ref 9.4–12.4)
POTASSIUM SERPL-SCNC: 4.7 MEQ/L (ref 3.5–5.2)
RBC # BLD: 4.67 MILL/MM3 (ref 4.7–6.1)
SCAN OF BLOOD SMEAR: NORMAL
SEG NEUTROPHILS: 76 %
SEGMENTED NEUTROPHILS ABSOLUTE COUNT: 12.8 THOU/MM3 (ref 1.8–7.7)
SODIUM BLD-SCNC: 137 MEQ/L (ref 135–145)
TOTAL PROTEIN: 6.4 G/DL (ref 6.1–8)
WBC # BLD: 16.8 THOU/MM3 (ref 4.8–10.8)

## 2020-09-13 PROCEDURE — 2700000000 HC OXYGEN THERAPY PER DAY

## 2020-09-13 PROCEDURE — 97110 THERAPEUTIC EXERCISES: CPT

## 2020-09-13 PROCEDURE — 2060000000 HC ICU INTERMEDIATE R&B

## 2020-09-13 PROCEDURE — 83615 LACTATE (LD) (LDH) ENZYME: CPT

## 2020-09-13 PROCEDURE — 6370000000 HC RX 637 (ALT 250 FOR IP): Performed by: STUDENT IN AN ORGANIZED HEALTH CARE EDUCATION/TRAINING PROGRAM

## 2020-09-13 PROCEDURE — 97530 THERAPEUTIC ACTIVITIES: CPT

## 2020-09-13 PROCEDURE — 6360000002 HC RX W HCPCS: Performed by: INTERNAL MEDICINE

## 2020-09-13 PROCEDURE — 2580000003 HC RX 258: Performed by: INTERNAL MEDICINE

## 2020-09-13 PROCEDURE — 80053 COMPREHEN METABOLIC PANEL: CPT

## 2020-09-13 PROCEDURE — 94760 N-INVAS EAR/PLS OXIMETRY 1: CPT

## 2020-09-13 PROCEDURE — 85025 COMPLETE CBC W/AUTO DIFF WBC: CPT

## 2020-09-13 PROCEDURE — 83735 ASSAY OF MAGNESIUM: CPT

## 2020-09-13 PROCEDURE — 99291 CRITICAL CARE FIRST HOUR: CPT | Performed by: INTERNAL MEDICINE

## 2020-09-13 PROCEDURE — 82948 REAGENT STRIP/BLOOD GLUCOSE: CPT

## 2020-09-13 PROCEDURE — 6370000000 HC RX 637 (ALT 250 FOR IP): Performed by: INTERNAL MEDICINE

## 2020-09-13 PROCEDURE — 36415 COLL VENOUS BLD VENIPUNCTURE: CPT

## 2020-09-13 RX ADMIN — Medication 10 ML: at 21:14

## 2020-09-13 RX ADMIN — ENOXAPARIN SODIUM 105 MG: 120 INJECTION SUBCUTANEOUS at 10:14

## 2020-09-13 RX ADMIN — FOLIC ACID 1 MG: 1 TABLET ORAL at 10:13

## 2020-09-13 RX ADMIN — DANAZOL 400 MG: 200 CAPSULE ORAL at 10:14

## 2020-09-13 RX ADMIN — HYDRALAZINE HYDROCHLORIDE 50 MG: 50 TABLET, FILM COATED ORAL at 10:12

## 2020-09-13 RX ADMIN — INSULIN GLARGINE 50 UNITS: 100 INJECTION, SOLUTION SUBCUTANEOUS at 21:01

## 2020-09-13 RX ADMIN — DILTIAZEM HYDROCHLORIDE 60 MG: 60 TABLET, FILM COATED ORAL at 10:13

## 2020-09-13 RX ADMIN — AMLODIPINE BESYLATE 5 MG: 5 TABLET ORAL at 10:14

## 2020-09-13 RX ADMIN — BUSPIRONE HYDROCHLORIDE 10 MG: 10 TABLET ORAL at 10:14

## 2020-09-13 RX ADMIN — DANAZOL 400 MG: 200 CAPSULE ORAL at 21:02

## 2020-09-13 RX ADMIN — FUROSEMIDE 40 MG: 40 TABLET ORAL at 10:13

## 2020-09-13 RX ADMIN — DOCUSATE SODIUM 100 MG: 100 CAPSULE, LIQUID FILLED ORAL at 21:02

## 2020-09-13 RX ADMIN — ASPIRIN 81 MG: 81 TABLET, CHEWABLE ORAL at 10:14

## 2020-09-13 RX ADMIN — INSULIN GLARGINE 50 UNITS: 100 INJECTION, SOLUTION SUBCUTANEOUS at 10:15

## 2020-09-13 RX ADMIN — BUSPIRONE HYDROCHLORIDE 10 MG: 10 TABLET ORAL at 21:03

## 2020-09-13 RX ADMIN — HYDRALAZINE HYDROCHLORIDE 50 MG: 50 TABLET, FILM COATED ORAL at 21:02

## 2020-09-13 RX ADMIN — ATORVASTATIN CALCIUM 40 MG: 40 TABLET, FILM COATED ORAL at 21:03

## 2020-09-13 RX ADMIN — ARIPIPRAZOLE 15 MG: 15 TABLET ORAL at 10:14

## 2020-09-13 RX ADMIN — POLYETHYLENE GLYCOL 3350 17 G: 17 POWDER, FOR SOLUTION ORAL at 10:12

## 2020-09-13 RX ADMIN — Medication 1000 UNITS: at 10:13

## 2020-09-13 RX ADMIN — CITALOPRAM 30 MG: 20 TABLET, FILM COATED ORAL at 10:13

## 2020-09-13 RX ADMIN — GABAPENTIN 800 MG: 400 CAPSULE ORAL at 21:02

## 2020-09-13 RX ADMIN — PANTOPRAZOLE SODIUM 40 MG: 40 TABLET, DELAYED RELEASE ORAL at 10:14

## 2020-09-13 RX ADMIN — ENOXAPARIN SODIUM 105 MG: 120 INJECTION SUBCUTANEOUS at 21:01

## 2020-09-13 RX ADMIN — GABAPENTIN 800 MG: 400 CAPSULE ORAL at 10:13

## 2020-09-13 RX ADMIN — DOCUSATE SODIUM 100 MG: 100 CAPSULE, LIQUID FILLED ORAL at 10:13

## 2020-09-13 RX ADMIN — DILTIAZEM HYDROCHLORIDE 60 MG: 60 TABLET, FILM COATED ORAL at 21:02

## 2020-09-13 RX ADMIN — Medication 10 ML: at 10:15

## 2020-09-13 RX ADMIN — TAMSULOSIN HYDROCHLORIDE 0.4 MG: 0.4 CAPSULE ORAL at 21:10

## 2020-09-13 RX ADMIN — ALLOPURINOL 500 MG: 300 TABLET ORAL at 10:14

## 2020-09-13 ASSESSMENT — PAIN SCALES - GENERAL
PAINLEVEL_OUTOF10: 0

## 2020-09-13 NOTE — PROGRESS NOTES
[facility] CVICU 4B  Occupational Therapy  Daily Note  Time:   Time In: 0840  Time Out: 4032  Timed Code Treatment Minutes: 25 Minutes  Minutes: 25    Date: 2020  Patient Name: Cachorro Staton,   Gender: male      Room: -05005-A  MRN: [id number]  : 1970  (44 y.o.)  Referring Practitioner: Denisse Chandler MD  Diagnosis: Covid-19  Additional Pertinent Hx: Per physician notes: The patient has history of obesity, obstructive sleep apnea, anxiety, depression, hypertension, hyperlipidemia, rheumatoid arthritis, type 2 diabetes mellitus, atrial fibrillation who was diagnosed with COVID pneumonia and admitted to [facility] last month. The patient was treated with Decadron, Remdesivir and azithromycin and then he was discharged on . The patient was brought to the emergency department via EMS today because he was found hypoxic in the nursing home. He was brought on nonrebreather mask, he was taken off oxygen for short period of time in the ER and he dropped to 81%. He was placed back on nonrebreather. The patient had low-grade fever there up to 100.3. His temperature in the emergency department was 99.5. He had some diarrhea. Repeat COVID test today is positive. Restrictions/Precautions:  Restrictions/Precautions: General Precautions, Fall Risk  Position Activity Restriction  Other position/activity restrictions: high flow O2     SUBJECTIVE: Patient supine upon arrival, agreeable to OT. Reports he hopes to discharge tomorrow back home. PAIN: C/o minor chest pain but states \"nothing like before. \"     COGNITION: WFL    ADL:   Toileting: Stand By Assistance. standing to urinate x 2 min. Ceasar Merrill BALANCE:  Sitting Balance:  Modified Independent. Standing Balance: Stand By Assistance, in prep to ambulate only, Contact Guard Assistance. SBA typically, did require CGA x 1 event when ambulating.      BED MOBILITY:  Supine to Sit: Stand By Assistance TRANSFERS:  Sit to Stand:  Stand By Assistance. EOB   Stand to Sit: Stand By Assistance. Good hand placement safety. FUNCTIONAL MOBILITY:  Assistive Device: None  Assist Level:  Stand By Assistance. Distance: Within room     ADDITIONAL ACTIVITIES:  Patient identified a personal goal to increase UB strength and improve overall endurance so they can complete their toilet & shower transfers; skilled edu on UE strengthening and patient completed BUE strengthening exercises x15 reps x1 set this date with a light weight resistive band in all joints and all planes. Patient tolerated well, requiring occasional rest breaks. Pt required  min cues for technique. ASSESSMENT:     Activity Tolerance:  Patient tolerance of  treatment: good. Discharge Recommendations: Continue to assess pending progress   Equipment Recommendations: Other: continue to assess needs  Plan: Times per week: 5x  Current Treatment Recommendations: Strengthening, Balance Training, Functional Mobility Training, Endurance Training, Self-Care / ADL, Safety Education & Training, Patient/Caregiver Education & Training    Patient Education  Patient Education: Home Exercise Program, Home Safety and Importance of Increasing Activity    Goals  Short term goals  Time Frame for Short term goals: Upon Discharge  Short term goal 1: Pt will complete functional mobility progressing to/from BR with SBA to increase indep with accessing evironment for self care. Short term goal 2: Pt will complete dynamic standing task x 4 min with B hand release and SBA to increase endurance required for grooming. Short term goal 3: Pt will complete LB ADL with Natalee to increase indep with self care. Following session, patient left in safe position with all fall risk precautions in place.

## 2020-09-13 NOTE — PROGRESS NOTES
Intensive Care Unit  Medical Intensive Care Unit  Attending Progress Note      Patient was seen and evaluated with Dr. Phan Hyatt. Team members present on rounds:  Nursing and Patient    ICU guidelines/prophylaxis active for the following:    head above bed above 30 degrees, insulin guidelines, DVT prophylaxis, ulcer prophylaxis and analgesia/sedation guidelines    Patient Examined? yes    Additions/differences/highlights to the resident's/fellow's exam:  VITALS:  /76   Pulse 63   Temp 98.2 °F (36.8 °C) (Oral)   Resp 18   Ht 5' 8\" (1.727 m)   Wt 232 lb 9.6 oz (105.5 kg)   SpO2 94%   BMI 35.37 kg/m²   24HR INTAKE/OUTPUT:    Intake/Output Summary (Last 24 hours) at 9/13/2020 1644  Last data filed at 9/13/2020 1500  Gross per 24 hour   Intake 1406.96 ml   Output 3675 ml   Net -2268.04 ml     VENT SETTINGS:    Vent Information  Skin Assessment: Clean, dry, & intact  Equipment ID: [id number]  Equipment Changed: (changed water bag)  FiO2 : 10 %  SpO2: 94 %  SpO2/FiO2 ratio: 666.67  Humidification Source: Heated wire  Humidification Temp: 34  Humidification Temp Measured: 34  Mask Type: Full face mask  Mask Size: Large  Additional Respiratory  Assessments  Pulse: 63  Resp: 18  SpO2: 94 %  Humidification Source: Heated wire  Humidification Temp: 34  Oral Care: Lip moisturizer applied    CONSTITUTIONAL: Subacute ill obese male asleep in chair when I entered the room  HEENT:  normocephalic and atraumatic. No scleral icterus. PERR conjunctival injection improved  Neck: supple. No Thyromegaly. Lungs:  No retractions. Diminished breath sounds bilaterally, intermittent wheezing noted. On 3.5 L/min nasal cannula. In no acute respiratory distress  Cardiac: RRR. No JVD. Abdomen: soft. Nontender. Bowel sounds present  Extremities:  No clubbing, cyanosis, or edema x 4. Vasculature: capillary refill < 3 seconds. Palpable dorsalis pedis pulses. Skin:  warm and dry. Psych:  Alert and oriented x3.   Affect appropriate  Lymph:  No supraclavicular adenopathy. Neurologic:  No focal deficit. No seizures  DATA:  CBC:   Lab Results   Component Value Date    WBC 16.8 09/13/2020    RBC 4.67 09/13/2020    RBC 4.55 04/15/2012    HGB 13.9 09/13/2020    HCT 42.8 09/13/2020    MCV 91.6 09/13/2020    RDW 19.9 04/06/2020     09/13/2020     CMP:    Lab Results   Component Value Date     09/13/2020    K 4.7 09/13/2020    K 4.6 09/07/2020    CL 99 09/13/2020    CO2 29 09/13/2020    BUN 26 09/13/2020    CREATININE 0.9 09/13/2020    GFRAA >60 01/23/2019    AGRATIO 0.8 06/24/2018    LABGLOM >90 09/13/2020    GLUCOSE 251 09/13/2020    GLUCOSE 113 06/24/2018    PROT 6.4 09/13/2020    CALCIUM 9.5 09/13/2020    BILITOT 0.2 09/13/2020    ALKPHOS 64 09/13/2020    AST 25 09/13/2020    ALT 50 09/13/2020       KEY ISSUES/FINDINGS/ASSESSMENT AND PLAN:    53-year-old male was admitted to [facility] for shortness of breath.  Patient has a significant past medical history for obstructive sleep apnea which uses CPAP machine, obesity, anxiety, depression, hypertension, hyperlipidemia, rheumatoid arthritis, type 2 diabetes and atrial fibrillation. Patient was diagnosed with COVID19  pneumonia and admitted to [facility] last month. Huseyin Mcclendon was treated at that time with Decadron, Remdesivir and azithromycin patient was discharged on 8/12/2020.  9/6/2020 patient was brought back to the emergency room department via EMS because he was found to be hypoxic at [address] arrived to the emergency room on a nonrebreather, he was taken off oxygen for short period of time in the emergency room department and oxygen saturation dropped to 81%.  At that time patient was placed back on nonrebreather.  Patient had a low-grade fever of 100. 3.  Patient admits to diarrhea prior to coming to the emergency room department.  Repeat COVID test positive.      Patient reports that after previous admission to [facility] for COVID infection 8/7/20, he returned back to [facility] and was well for about 4 to 5 days.  Patient admits that his roommate at [facility] was sick when he went back patient and he started to develop productive cough with green-yellow thick sputum production.  Since arrival to [facility] and treatment in the ICU patient has denied any cough or sputum production    Assessment and Plan:    1. Acute hypoxic respiratory failure: Secondary to COVID -19 infection. Patient currently on high flow oxygen. Concern for possible sequelae of COVID-19 pneumonialike organizing pneumonia versus active pneumonia or superimposed bacterial pneumonia.  On arrival pro-Luciano was 0.37, 9/9/20 0. 18.  Continue duonebs every 4 hours as needed for shortness of breath.  Patient currently on nasal cannula 3.5 L and SPO2 92% and respiratory rate of 12. Chest Xray 9/10/20 reports: Suboptimal inflation of lungs.  Borderline heart size.  No effusion.  Moderate mixed infiltrate scattered throughout both lungs, consistent with pneumonia.  Overall appearance slightly improved from prior study. 2. COVID-19 pneumonia/infection: Patient was diagnosed over a month ago and treated with Decadron, 3-day course of remdesivir, and azithromycin.  Patient is currently requiring high flow oxygen, will continue to monitor his oxygen saturation.  Inflammatory markers are elevated, pro calcitonin remains low .  On arrival pro-Luciano was 0.37, 9/10/20 0. 19.  Strep pneumonia urine antigen negative, Legionella antigen negative, blood cultures negative x2 preliminary, VRE screen by PCR positive.  Continue patient on Remdesivir.  Continue danazol 400 mg BID, start Decadron 6 mg daily for total of 3 doses.  Due to extended half-life of Decadron, patient will continue to receive Decadron anti-inflammatory effects even after completing 3 doses, for a total of 6-8 days of anti- inflammatory effect (today will patient will receive last dose of Decadron).  Continue start aspirin 81 mg daily for treatment and prevention microthrombosis which is noted to be common in COVID/ARDS conditions.  Acute lung injury secondary to COVID-19 will continue to closely monitor patient for decompensation that would require mechanical ventilation. 3. History of vitamin D deficiency: Vitamin D, 25 Hydroxy 40 ng/ml 9/9/20. There is evidence to support that improved with COVID19 infections vitamin D helps breakdown bradykinin and in turn reducing sequelae of COVID19. Continue vitamin D 1000 units daily supplementation   4. Atrial fibrillation: Rate controlled - Cardizem 60mg  BID.  Patient was previously on Eliquis however due to weight being greater than 120 kg. Lovenox 135 mg subcutaneous BID is indicated as treatment of choice for anticoagulation   5. Obstructive sleep apnea: Patient reports wearing CPAP however he is unaware of the settings at home.  Patient placed on BiPAP at night and as needed throughout the day  6. Uncontrolled diabetes mellitus type 2 with hyperglycemia: Uncontrolled likely due to steroid use. Glucose continues to increase. Patient expresses he is very hungry all the time.  Diet was increased so he receives an ample between breakfast and lunch, orange between lunch and dinner, and half apple in the evening. Continue with carb control diet. Continue Lantus 50 units BID,icnrease to high does ISS, gabapentin 800 mg twice daily.    7. Chronic diastolic congestive heart failure: Preserved ejection fraction-last echo was completed 10/10/2019 reports: Normal left ventricle size and systolic function. Ejection fraction was estimated at 55 to 60 %. There were no regional left ventricular wall motion abnormalities and wall thickness was within normal limits. The left atrium is Mildly dilated.  We will continue Lasix 40 mg daily, Norvasc 5 mg daily, Cardizem 60 mg twice daily  8.  History of rheumatoid arthritis: Rheumatoid factor positive. Percocet 5-325 mg every 4 hours for moderate to severe pain, however patient states he has not had a bowel movement in greater than 4 days started Colace 100 mg twice daily  9. Morbid obesity: BMI is 39. 81.  Weight loss will be encouraged when appropriate  10. Anxiety/depression: Continue BuSpar 10 mg twice daily, Celexa 30 mg daily,Abilify 15 mg daily  11. History of hyperlipidemia: Continue Lipitor 40 mg nightly  12. History of hypertension: Continue Norvasc 5 mg daily, Lasix 40 mg daily  13. History of chronic gout: Continue allopurinol 500 mg daily  14. History of BPH: Continue Flomax 0.4 mg nightly  15. GI prophylaxis:   Protonix 40 mg daily  16.  DVT prophylaxis: Patient already receiving anticoagulation for atrial fibrillation    Critical condition, guarded prognosis  Cc time 31 min apart from procedures

## 2020-09-13 NOTE — PLAN OF CARE
Problem: Airway Clearance - Ineffective  Goal: Achieve or maintain patent airway  9/12/2020 2209 by Malik Miller RN  Outcome: Ongoing  Note: Patient able to maintain a patent airway. Problem: Gas Exchange - Impaired  Goal: Absence of hypoxia  9/12/2020 2209 by Malik Miller RN  Outcome: Ongoing  Note: No signs of hypoxia at rest.     Problem: Gas Exchange - Impaired  Goal: Promote optimal lung function  9/12/2020 2209 by Malik Miller RN  Outcome: Ongoing  Note: Encouraging the patient to cough and deep breath. Problem: Body Temperature -  Risk of, Imbalanced  Goal: Ability to maintain a body temperature within defined limits  9/12/2020 2209 by Malik Miller RN  Outcome: Ongoing  Note: Patient has remained afebrile tonight. Problem: Body Temperature -  Risk of, Imbalanced  Goal: Will regain or maintain usual level of consciousness  9/12/2020 2209 by Malik Miller RN  Outcome: Ongoing  Note: Patient alert and oriented x4. Problem: Isolation Precautions - Risk of Spread of Infection  Goal: Prevent transmission of infection  9/12/2020 2209 by Malik Miller RN  Outcome: Ongoing  Note: Patient is in contact isolation for MRSA & VRE and droplet + for COVID 19. Problem: Discharge Planning:  Goal: Discharged to appropriate level of care  Description: Discharged to appropriate level of care  9/12/2020 2209 by Malik Miller RN  Outcome: Ongoing  Note: Patient plans to go home with home health. Problem: Serum Glucose Level - Abnormal:  Goal: Ability to maintain appropriate glucose levels will improve  Description: Ability to maintain appropriate glucose levels will improve  9/12/2020 2209 by Malik Miller RN  Outcome: Ongoing  Note: Checking the patient's blood sugar before meals and at bedtime. Patient's blood sugar remain elevated.       Problem: Pain Control  Goal: Maintain pain level at or below patient's acceptable level (or 5 if patient is unable to determine acceptable level)  9/12/2020 2209 by Jese Wharton RN  Outcome: Ongoing  Note: Pain Assessment: 0-10  Pain Level: 0   Patient's Stated Pain Goal: No pain   Is pain goal met at this time? Yes     Non-Pharmaceutical Pain Intervention(s): Repositioned     Problem: Cardiovascular  Goal: No DVT, peripheral vascular complications  1/43/8121 2209 by Jese Wharton RN  Outcome: Ongoing  Note: No signs or symptoms of DVT. Patient getting Lovenox BID. Problem: Cardiovascular  Goal: Hemodynamic stability  9/12/2020 2209 by Jese Wharton RN  Outcome: Ongoing  Note: Vital signs have remained stable and within normal limits. Vitals being monitored every 4 hours and as needed. Continuous cardiac monitor in place. Heart rhythm is NSR. Problem: Respiratory  Goal: No pulmonary complications  7/19/7617 2209 by Jese Wharton RN  Outcome: Ongoing  Note: Patient weaned from 4L's NC to 3L's NC today with oxygen saturations above 92%. Continuous pulse ox in place. Patient gets SOB with ambulation. Lungs are clear in the upper lobes and diminished in the bases. Problem: Respiratory  Goal: O2 Sat > 90%  9/12/2020 2209 by Jese Wharton RN  Outcome: Ongoing  Note: Patient weaned from 4L's NC to 3L's NC today with oxygen saturations above 92%. Continuous pulse ox in place. Problem: Respiratory  Goal: Supplemental O2 requirements decreased  9/12/2020 2209 by Jese Wharton RN  Outcome: Ongoing  Note: Patient weaned from 4L's NC to 3L's NC today with oxygen saturations above 92%. Continuous pulse ox in place. Will continue to wean as the patient tolerates. Problem: GI  Goal: No bowel complications  1/19/1761 2209 by Jese Wharton RN  Outcome: Ongoing  Note: No BM tonight. Abdomen is soft, rounded and non-tender. Active bowel sounds heard in all 4 quadrants. Patient is passing gas.       Problem:   Goal: Adequate urinary output  9/12/2020 2209 by Monica Durand RN  Outcome: Ongoing  Note: Patient voiding an adequate amount. Patient voiding yellow, hazy colored urine. Problem: Nutrition  Goal: Optimal nutrition therapy  9/12/2020 2209 by Monica Durand RN  Outcome: Ongoing  Note: Patient on a carb control diet. Problem: Skin Integrity/Risk  Goal: No skin breakdown during hospitalization  9/12/2020 2209 by Monica Durand RN  Outcome: Ongoing  Note: No new areas of skin breakdown noted tonight. Skin is warm and dry. Patient has scattered bruising and abrasions, blanchable redness to his buttocks & bilateral heels. Patient able to turn and reposition himself independently. Waffle cushion while patient is in the chair. Problem: Musculor/Skeletal Functional Status  Goal: Absence of falls  9/12/2020 2209 by Monica Durand RN  Outcome: Ongoing  Note: Patient has remained free from falls tonight. Call light and bedside table are within reach. Patient alert and oriented and uses his call light appropriately. Patient ambulates with 1 assist. Fall precautions in place for his safety. Nurse checks on the patient every hour. Problem: Breathing Pattern - Ineffective  Goal: Ability to achieve and maintain a regular respiratory rate  9/12/2020 2209 by Monica Durand RN  Outcome: Ongoing  Note: Patient has a regular respiratory rate and effort. Care plan reviewed with patient. Patient verbalize understanding of the plan of care and contribute to goal setting.

## 2020-09-13 NOTE — PROGRESS NOTES
Patient developed a bloody nose after blowing it. Large blood clot expelled. Cleaned patient up and new gown and blankets provided.

## 2020-09-13 NOTE — PLAN OF CARE
Problem: Airway Clearance - Ineffective  Goal: Achieve or maintain patent airway  Note: Monitor SPO2 and assess lung sounds per protocol     Problem: Pain Control  Goal: Maintain pain level at or below patient's acceptable level (or 5 if patient is unable to determine acceptable level)  Note: Ongoing assessment & interventions provided throughout shift. Reminded patient to report any pain, pressure, or shortness of breath to the nurse. Pain medications provided per physician's orders. Problem:   Goal: Adequate urinary output  Note: Monitor daily output     Problem: Nutrition  Goal: Optimal nutrition therapy  Note: Provide meals and monitor intake. Encourage proper nutrition     Problem: Nutrition  Goal: Optimal nutrition therapy  Note: Provide meals and monitor intake. Encourage proper nutrition     Problem: Skin Integrity/Risk  Goal: No skin breakdown during hospitalization  Note: Ongoing assessment & interventions provided throughout shift. Skin assessments provided. Encouraging/assisting patient to turn as needed. Care plan reviewed with patient. Patient verbalizes understanding of the care plan and contributed to goal setting.

## 2020-09-14 LAB
ALBUMIN SERPL-MCNC: 3.4 G/DL (ref 3.5–5.1)
ALP BLD-CCNC: 66 U/L (ref 38–126)
ALT SERPL-CCNC: 59 U/L (ref 11–66)
ANION GAP SERPL CALCULATED.3IONS-SCNC: 11 MEQ/L (ref 8–16)
AST SERPL-CCNC: 40 U/L (ref 5–40)
BASOPHILS # BLD: 1.2 %
BASOPHILS ABSOLUTE: 0.2 THOU/MM3 (ref 0–0.1)
BILIRUB SERPL-MCNC: 0.3 MG/DL (ref 0.3–1.2)
BUN BLDV-MCNC: 21 MG/DL (ref 7–22)
CALCIUM SERPL-MCNC: 9.8 MG/DL (ref 8.5–10.5)
CHLORIDE BLD-SCNC: 97 MEQ/L (ref 98–111)
CO2: 27 MEQ/L (ref 23–33)
CREAT SERPL-MCNC: 0.7 MG/DL (ref 0.4–1.2)
EOSINOPHIL # BLD: 0.5 %
EOSINOPHILS ABSOLUTE: 0.1 THOU/MM3 (ref 0–0.4)
ERYTHROCYTE [DISTWIDTH] IN BLOOD BY AUTOMATED COUNT: 16.5 % (ref 11.5–14.5)
ERYTHROCYTE [DISTWIDTH] IN BLOOD BY AUTOMATED COUNT: 55.5 FL (ref 35–45)
GFR SERPL CREATININE-BSD FRML MDRD: > 90 ML/MIN/1.73M2
GLUCOSE BLD-MCNC: 184 MG/DL (ref 70–108)
GLUCOSE BLD-MCNC: 207 MG/DL (ref 70–108)
GLUCOSE BLD-MCNC: 216 MG/DL (ref 70–108)
GLUCOSE BLD-MCNC: 250 MG/DL (ref 70–108)
GLUCOSE BLD-MCNC: 266 MG/DL (ref 70–108)
HCT VFR BLD CALC: 47.4 % (ref 42–52)
HEMOGLOBIN: 14.1 GM/DL (ref 14–18)
HEMOGLOBIN: 15.1 GM/DL (ref 14–18)
IMMATURE GRANS (ABS): 1.29 THOU/MM3 (ref 0–0.07)
IMMATURE GRANULOCYTES: 8.8 %
LD: 465 U/L (ref 100–190)
LYMPHOCYTES # BLD: 16.7 %
LYMPHOCYTES ABSOLUTE: 2.4 THOU/MM3 (ref 1–4.8)
MAGNESIUM: 2 MG/DL (ref 1.6–2.4)
MCH RBC QN AUTO: 29.6 PG (ref 26–33)
MCHC RBC AUTO-ENTMCNC: 31.9 GM/DL (ref 32.2–35.5)
MCV RBC AUTO: 92.9 FL (ref 80–94)
MONOCYTES # BLD: 7.7 %
MONOCYTES ABSOLUTE: 1.1 THOU/MM3 (ref 0.4–1.3)
NUCLEATED RED BLOOD CELLS: 1 /100 WBC
PLATELET # BLD: 324 THOU/MM3 (ref 130–400)
PLATELET ESTIMATE: ADEQUATE
PMV BLD AUTO: 11 FL (ref 9.4–12.4)
POTASSIUM SERPL-SCNC: 4.6 MEQ/L (ref 3.5–5.2)
RBC # BLD: 5.1 MILL/MM3 (ref 4.7–6.1)
SCAN OF BLOOD SMEAR: NORMAL
SEG NEUTROPHILS: 65.1 %
SEGMENTED NEUTROPHILS ABSOLUTE COUNT: 9.5 THOU/MM3 (ref 1.8–7.7)
SODIUM BLD-SCNC: 135 MEQ/L (ref 135–145)
TOTAL PROTEIN: 6.7 G/DL (ref 6.1–8)
WBC # BLD: 14.6 THOU/MM3 (ref 4.8–10.8)

## 2020-09-14 PROCEDURE — 82948 REAGENT STRIP/BLOOD GLUCOSE: CPT

## 2020-09-14 PROCEDURE — 2700000000 HC OXYGEN THERAPY PER DAY

## 2020-09-14 PROCEDURE — 36415 COLL VENOUS BLD VENIPUNCTURE: CPT

## 2020-09-14 PROCEDURE — 85025 COMPLETE CBC W/AUTO DIFF WBC: CPT

## 2020-09-14 PROCEDURE — 97535 SELF CARE MNGMENT TRAINING: CPT

## 2020-09-14 PROCEDURE — 85018 HEMOGLOBIN: CPT

## 2020-09-14 PROCEDURE — 83735 ASSAY OF MAGNESIUM: CPT

## 2020-09-14 PROCEDURE — 83615 LACTATE (LD) (LDH) ENZYME: CPT

## 2020-09-14 PROCEDURE — 97110 THERAPEUTIC EXERCISES: CPT

## 2020-09-14 PROCEDURE — 80053 COMPREHEN METABOLIC PANEL: CPT

## 2020-09-14 PROCEDURE — 94761 N-INVAS EAR/PLS OXIMETRY MLT: CPT

## 2020-09-14 PROCEDURE — 99232 SBSQ HOSP IP/OBS MODERATE 35: CPT | Performed by: NURSE PRACTITIONER

## 2020-09-14 PROCEDURE — 2060000000 HC ICU INTERMEDIATE R&B

## 2020-09-14 PROCEDURE — 6360000002 HC RX W HCPCS: Performed by: INTERNAL MEDICINE

## 2020-09-14 PROCEDURE — 6370000000 HC RX 637 (ALT 250 FOR IP): Performed by: INTERNAL MEDICINE

## 2020-09-14 PROCEDURE — 6370000000 HC RX 637 (ALT 250 FOR IP): Performed by: STUDENT IN AN ORGANIZED HEALTH CARE EDUCATION/TRAINING PROGRAM

## 2020-09-14 PROCEDURE — 97116 GAIT TRAINING THERAPY: CPT

## 2020-09-14 PROCEDURE — 2580000003 HC RX 258: Performed by: INTERNAL MEDICINE

## 2020-09-14 RX ADMIN — TAMSULOSIN HYDROCHLORIDE 0.4 MG: 0.4 CAPSULE ORAL at 20:31

## 2020-09-14 RX ADMIN — INSULIN GLARGINE 50 UNITS: 100 INJECTION, SOLUTION SUBCUTANEOUS at 20:35

## 2020-09-14 RX ADMIN — ENOXAPARIN SODIUM 120 MG: 120 INJECTION SUBCUTANEOUS at 08:51

## 2020-09-14 RX ADMIN — PANTOPRAZOLE SODIUM 40 MG: 40 TABLET, DELAYED RELEASE ORAL at 08:50

## 2020-09-14 RX ADMIN — ALLOPURINOL 500 MG: 300 TABLET ORAL at 08:50

## 2020-09-14 RX ADMIN — INSULIN GLARGINE 50 UNITS: 100 INJECTION, SOLUTION SUBCUTANEOUS at 09:01

## 2020-09-14 RX ADMIN — HYDRALAZINE HYDROCHLORIDE 50 MG: 50 TABLET, FILM COATED ORAL at 08:50

## 2020-09-14 RX ADMIN — ASPIRIN 81 MG: 81 TABLET, CHEWABLE ORAL at 08:51

## 2020-09-14 RX ADMIN — DOCUSATE SODIUM 100 MG: 100 CAPSULE, LIQUID FILLED ORAL at 20:32

## 2020-09-14 RX ADMIN — BUSPIRONE HYDROCHLORIDE 10 MG: 10 TABLET ORAL at 20:32

## 2020-09-14 RX ADMIN — GABAPENTIN 800 MG: 400 CAPSULE ORAL at 20:32

## 2020-09-14 RX ADMIN — ARIPIPRAZOLE 15 MG: 15 TABLET ORAL at 08:50

## 2020-09-14 RX ADMIN — AMLODIPINE BESYLATE 5 MG: 5 TABLET ORAL at 08:50

## 2020-09-14 RX ADMIN — DILTIAZEM HYDROCHLORIDE 60 MG: 60 TABLET, FILM COATED ORAL at 20:32

## 2020-09-14 RX ADMIN — Medication 10 ML: at 20:32

## 2020-09-14 RX ADMIN — DOCUSATE SODIUM 100 MG: 100 CAPSULE, LIQUID FILLED ORAL at 08:51

## 2020-09-14 RX ADMIN — GABAPENTIN 800 MG: 400 CAPSULE ORAL at 08:50

## 2020-09-14 RX ADMIN — FOLIC ACID 1 MG: 1 TABLET ORAL at 08:50

## 2020-09-14 RX ADMIN — BUSPIRONE HYDROCHLORIDE 10 MG: 10 TABLET ORAL at 08:50

## 2020-09-14 RX ADMIN — ATORVASTATIN CALCIUM 40 MG: 40 TABLET, FILM COATED ORAL at 20:32

## 2020-09-14 RX ADMIN — CITALOPRAM 30 MG: 20 TABLET, FILM COATED ORAL at 08:50

## 2020-09-14 RX ADMIN — DANAZOL 400 MG: 200 CAPSULE ORAL at 08:51

## 2020-09-14 RX ADMIN — FUROSEMIDE 40 MG: 40 TABLET ORAL at 08:50

## 2020-09-14 RX ADMIN — HYDRALAZINE HYDROCHLORIDE 50 MG: 50 TABLET, FILM COATED ORAL at 20:32

## 2020-09-14 RX ADMIN — Medication 1000 UNITS: at 08:50

## 2020-09-14 RX ADMIN — DILTIAZEM HYDROCHLORIDE 60 MG: 60 TABLET, FILM COATED ORAL at 08:50

## 2020-09-14 ASSESSMENT — PAIN SCALES - GENERAL: PAINLEVEL_OUTOF10: 0

## 2020-09-14 NOTE — PROGRESS NOTES
[address] [address]  INPATIENT PHYSICAL THERAPY  DAILY NOTE  STRZ 6A CAPACITY EBOLA - 6A-18/018-A      Time In: 1340  Time Out: 1418  Timed Code Treatment Minutes: 45 Minutes  Minutes: 38          Date: 2020  Patient Name: Sayda Corcoran,  Gender:  male        MRN: [id number]  : 1970  (44 y.o.)     Referring Practitioner: Dr. Alvaro Huynh  Diagnosis: COVID-19 virus infection  Additional Pertinent Hx: admit with above diagnosis, pneumonia, KIARA     Prior Level of Function:  Lives With: Alone  Type of Home: Facility  Home Layout: One level  Home Equipment: (no AD PTA)        ADL Assistance: Independent  Homemaking Responsibilities: No  Ambulation Assistance: Independent  Transfer Assistance: Independent  Additional Comments: was planning to discharge from ECF soon to apt which has a step to enter then an elevator pt can use. Pt has been at [facility] x 1 year. Restrictions/Precautions:  Restrictions/Precautions: General Precautions, Fall Risk  Position Activity Restriction  Other position/activity restrictions: O2    SUBJECTIVE: RN approved session. RT arrived at beginning of session and wanting to complete home O2 eval while amb pt. Pt up in bedside chair at arrival, pleasant and agreeable to session. PAIN: 0/10:     OBJECTIVE:  Bed Mobility:  Not Tested    Transfers:  Sit to Stand: Contact Guard Assistance  Stand to Sit:Contact Guard Assistance    Ambulation:  Contact Guard Assistance  Distance: [id number]  Surface: Level Tile  Device:No Device  Gait Deviations:  Slow Gracia, Decreased Step Length Bilaterally, Wide Base of Support, Mild Path Deviations, Unsteady Gait and waddling pattern  *O2 monitored by RT during amb, pt initially 93% as walk progress pt dropped to low 80's, RT therapist turning O2 from 2L to 4L then 6L pt still 82%, upon returning to room pt requires long rest break to recover, pt then ~93% rest of session.      Balance:  Static Standing Balance: Contact Guard Assistance    Exercise:  Patient was guided in seated set(s) 15 reps of exercise to both lower extremities. Glut sets, Hip abduction/adduction, Seated marches, Seated heel/toe raises and Long arc quads. Exercises were completed for increased independence with functional mobility. Functional Outcome Measures: Completed  AM-PAC Inpatient Mobility Raw Score : 18  AM-PAC Inpatient T-Scale Score : 43.63  AM-PAC Inpatient Mobility without Stair Climbing Raw Score : 15  AM-PAC Inpatient without Stair Climbing T-Scale Score : 43.03    ASSESSMENT:  Assessment: Patient progressing toward established goals. Activity Tolerance:  Patient tolerance of  treatment: fair. RT took pt off O2 while sitting in chair performing therex pt ~93% the entire time. Equipment Recommendations: Other: cont to monitor for pt needs  Discharge Recommendations:    (pt would benefit from a skilled therapy stay prior to return to his apartment)    Plan: Times per week: 5X GM  Times per day: Daily  Specific instructions for Next Treatment: therex and mobility    Patient Education  Patient Education: Plan of Care    Goals:  Patient goals : go to apt from hospital next Monday  Short term goals  Time Frame for Short term goals: by discharge  Short term goal 1: bed mobility with MOD I to get in/out of bed  Short term goal 2: transfer with MOD I to get in/out of chairs  Short term goal 3: amb >100'x1 without AD and MOD I to walk safely in apt  Long term goals  Time Frame for Long term goals : no LTGs set secondary to short ELOS    Following session, patient left in safe position with all fall risk precautions in place.

## 2020-09-14 NOTE — PROGRESS NOTES
A home oxygen evaluation has been completed. [x]Patient is an inpatient. It is expected that the patient will be discharged within the next 48 hours. Qualified provider to write order for home prescription if patient qualifies. Social service/care managers will arrange for home oxygen. If patient is active, arrange for Home Medical supplier to assess for Oxygen Conserving Device per pulse oximetry. []Patient is an outpatient. Results will be faxed to the ordering provider. Qualified provider to write order for home prescription if patient qualifies and arranges for home oxygen. Patient was placed on room air for 20 minutes. SpO2 was 96 % on room air at rest. Patients SpO2 was 89% or above and did not qualify for home oxygen. Patient was walked for 6 minutes. SpO2 was 81 % during walking. Patients SpO2 was below 89% and qualified for home oxygen. Oxygen was applied at 6 lpm via nasal cannula Actual SpO2 was 84 %. If oxygen need is greater than 4 lpm the SpO2 on 4 lpm was 81    Note: For any SpO2 at 92% see policy and procedure for possible qualifications.

## 2020-09-14 NOTE — PLAN OF CARE
complications  Outcome: Ongoing  Note: Bowel sounds are active in all four quadrants. Patient had bowel movement to 9/14/2020. Problem: Skin Integrity/Risk  Goal: No skin breakdown during hospitalization  9/14/2020 0059 by [place] Castaneda RN  Outcome: Ongoing  Note: Ongoing assessment & interventions provided throughout shift. Skin assessments provided. Encouraging/assisting patient to turn as needed. No new skin breakdown noted. Will continue to monitor. Problem: Musculor/Skeletal Functional Status  Goal: Absence of falls  Outcome: Ongoing  Note: Pt free from falls this shift. Bed alarm on, 2/4 side rails up, call light in reach, fall band on, nonskid socks on, clear pathway, bed in locked and lowest position. Will continue to monitor. Care plan reviewed with patient. Patient verbalizes understanding of the care plan and contributed to goal setting.

## 2020-09-14 NOTE — PROGRESS NOTES
[facility] 6A [facility]AC[facility] [place]  Occupational Therapy  Daily Note  Time:   Time In: 932  Time Out: 337  Timed Code Treatment Minutes: 40 Minutes  Minutes: 40          Date: 2020  Patient Name: Eber Linares,   Gender: male      Room: -18/018-A  MRN: [id number]  : 1970  (44 y.o.)  Referring Practitioner: Mika Lindo MD  Diagnosis: Covid-19  Additional Pertinent Hx: Per physician notes: The patient has history of obesity, obstructive sleep apnea, anxiety, depression, hypertension, hyperlipidemia, rheumatoid arthritis, type 2 diabetes mellitus, atrial fibrillation who was diagnosed with COVID pneumonia and admitted to [facility] last month. The patient was treated with Decadron, Remdesivir and azithromycin and then he was discharged on . The patient was brought to the emergency department via EMS today because he was found hypoxic in the nursing home. He was brought on nonrebreather mask, he was taken off oxygen for short period of time in the ER and he dropped to 81%. He was placed back on nonrebreather. The patient had low-grade fever there up to 100.3. His temperature in the emergency department was 99.5. He had some diarrhea. Repeat COVID test today is positive. Restrictions/Precautions:  Restrictions/Precautions: General Precautions, Fall Risk  Position Activity Restriction  Other position/activity restrictions: O2     SUBJECTIVE: Nurse ok'd session. Patient seated in chair. Agreeable to OT session    PAIN: Denies    COGNITION: Decreased Safety Awareness    ADL:   Grooming: Contact Guard Assistance. Standing sinkside to wash hands after toileting tasks. Setup to wash face seated in chair  Upper Extremity Dressing: Minimal Assistance standing to doff/don gown   [address] For clothing management. Increased time with use of bathroom this AM   Toilet Transfer: [address]  To/from STS using grab bar as needed. *After toileting tasks, checked O2 sats on 2L with reading of 89%. Encouraged deep breathing - demonstrating understanding. Patient began to have bloody nose. Complained of feeling slightly lightheaded, extended lengthy seated rest break required to subside. Nurse notified. BALANCE:  Sitting Balance:  Supervision. Standing Balance: Contact Guard Assistance. BED MOBILITY:  Not Tested    TRANSFERS:  Sit to Stand:  Contact Guard Assistance. From various surfaces  Stand to Sit: Contact Guard Assistance. - SBA to various surfaces    FUNCTIONAL MOBILITY:  Assistive Device: None  Assist Level:  Contact Guard Assistance. Distance: To and from bathroom and approx  100 ft x1 trial   Fair pace, cueing to slow down at times. Slightly unsteady. Assist provided to manage O2 tubing. Checked O2 after 100 ft of mobility with reading of 84% on 2L, encouraged deep breathing, extended time to recover to 95%. Lengthy seated rest break required after mobility, complained slightly of feeling lightheaded     ASSESSMENT:     Activity Tolerance:  Patient tolerance of  treatment: fair. Discharge Recommendations: Continue to assess pending progress   Equipment Recommendations: Other: continue to assess needs  Plan: Times per week: 5x  Current Treatment Recommendations: Strengthening, Balance Training, Functional Mobility Training, Endurance Training, Self-Care / ADL, Safety Education & Training, Patient/Caregiver Education & Training    Patient Education  Patient Education: deep breathing, safety with transfers and mobility, ADL strategies     Goals  Short term goals  Time Frame for Short term goals: Upon Discharge  Short term goal 1: Pt will complete functional mobility progressing to/from BR with SBA to increase indep with accessing evironment for self care. Short term goal 2: Pt will complete dynamic standing task x 4 min with B hand release and SBA to increase endurance required for grooming.   Short term goal 3: Pt will complete LB ADL with Natalee to increase indep with self care. Following session, patient left in safe position with all fall risk precautions in place.

## 2020-09-14 NOTE — PROGRESS NOTES
Nutrition Assessment     Type and Reason for Visit: Initial(LOS nutrition evaluation)    Nutrition Recommendations/Plan:   Continue diet per MD.   Recommend outpt. DB nutrition FU. Nutrition Assessment:    Pt. nutritionally compromised AEB obese with elevated A1c. At risk for further nutrition compromise r/t admit with covid and underlying medical condition CAD, DM, HTN, depression, morbidly obese. Nutrition recommendations/interventions as per above.     Malnutrition Assessment:  Malnutrition Status: No malnutrition    Nutrition Related Findings: covid; talked with pt. on phone; great appetite now - CNP ordered between meal snacks for pt. also; encouraged lean protein sources and limiting cho intake; BM x1 9/14;  meds include colace, folic acid, lantus, humalog, glycolax; A1c 9.9%      Current Nutrition Therapies:    DIET CARB CONTROL; Carb Control: 4 carb choices (60 gms)/meal    Anthropometric Measures:  · Height: 5' 8\" (172.7 cm)  · Current Body Wt: 280 lb (127 kg)(9/13 with +1 edema)   · BMI: 42.6    Nutrition Diagnosis:   · Altered nutrition-related lab values related to endocrine dysfuntion as evidenced by other (comment)(A1c 9.9%; BMI 42.6)      Nutrition Interventions:   Food and/or Nutrient Delivery:  Continue Current Diet  Nutrition Education/Counseling:  Education initiated(verbally discussed healthy choices; lean protein sources and limiting cho for better glucose control)   Coordination of Nutrition Care:  Continued Inpatient Monitoring    Goals:  understanding of basic ADA diet guidelines       Nutrition Monitoring and Evaluation:   Food/Nutrient Intake Outcomes:  Food and Nutrient Intake  Physical Signs/Symptoms Outcomes:  Biochemical Data, GI Status, Hemodynamic Status, Meal Time Behavior, Skin, Weight     Discharge Planning:    Continue current diet, Recommend pursue outpatient diabetes education     Electronically signed by Lucretia Fernández RD, LD on 9/14/20 at 3:20 PM EDT    Contact: [phone]

## 2020-09-14 NOTE — PROGRESS NOTES
Pt transferred to 6A18 fo 4B05  Complaints: Shortness of breath COVID+. IV site free of s/s of infection or infiltration. Vital signs obtained. Assessment and data collection initiated. Two nurse skin assessment performed by AMI Mccullough RN and Sharron Vasquez RN. Oriented to room. Policies and procedures for 3B explained. AMI Mccullough RN discussed hourly rounding with patient addressing 5 P's. Fall prevention discussed with patient. Bed alarm on. Call light in reach. All questions answered with no further questions at this time.

## 2020-09-14 NOTE — PROGRESS NOTES
Hospitalist Progress Note      Patient:  Kandy Vail    Unit/Bed:6A-18/018-A  YOB: 1970  MRN: [id number]   Acct: [de-identified]   PCP: Lorenza Zamudio DO  Date of Admission: 9/6/2020    Assessment/Plan:    1. Acute hypoxic respiratory failure: Improving. Secondary to COVID -19 infection. On 3L of oxygen to maintain adequate O2 saturations. Concern for possible sequelae of COVID-19 pneumonia-like organizing pneumonia versus active pneumonia or superimposed bacterial pneumonia.  On arrival pro-Luciano was 0.37, 9/9/20 0. 18.  Continue duonebs every 4 hours as needed for shortness of breath. Chest Xray 9/10/20 reports: Suboptimal inflation of lungs.  Borderline heart size.  No effusion.  Moderate mixed infiltrate scattered throughout both lungs, consistent with pneumonia.  Overall appearance slightly improved from prior study. 2. COVID-19 pneumonia/infection: Patient was diagnosed over a month ago and treated with Decadron, 3-day course of remdesivir, and azithromycin. Will continue to monitor his oxygen saturation.  Inflammatory markers or elevated, procalcitonin remains low .  On arrival pro-Luciano was 0.37, 9/10/20 0. 19.  Strep pneumonia urine antigen negative, Legionella antigen negative, blood cultures negative x2 preliminary, VRE screen by PCR positive. Continue patient on Remdesivir.  Continue danazol 400 mg BID, patient received Decadron 6 mg daily for total of 3 doses.  Due to extended half-life of Decadron, patient will continue to receive Decadron anti-inflammatory effects even after completing 3 doses, for a total of 6-8 days of anti- inflammatory effect (today will patient will receive last dose of Decadron).  Continue start aspirin 81 mg daily for treatment and prevention microthrombosis which is noted to be common in COVID/ARDS conditions.   3. History of vitamin D deficiency: Vitamin D, 25 Hydroxy 40 ng/ml 9/9/20. There is evidence to support that improved with COVID19 infections vitamin D helps breakdown bradykinin and in turn reducing sequelae of COVID19. Continue vitamin D 1000 units daily supplementation   4. Atrial fibrillation: Rate controlled - Cardizem 60mg  BID.  Patient was previously on Eliquis however due to weight being greater than 120 kg. Lovenox 135 mg subcutaneous BID is indicated as treatment of choice for anticoagulation   5. Obstructive sleep apnea: Patient reports wearing CPAP however he is unaware of the settings at home.  Patient placed on BiPAP at night and as needed throughout the day  6. Uncontrolled diabetes mellitus type 2 with hyperglycemia: Uncontrolled likely due to steroid use. Glucose continues to increase. Patient expresses he is very hungry all the time.  Diet was increased so he receives an ample between breakfast and lunch, orange between lunch and dinner, and half apple in the evening. Continue with carb control diet. Continue Lantus 50 units BID, change to medium does ISS, continue gabapentin 800 mg twice daily. 7. Chronic diastolic congestive heart failure: Preserved ejection fraction-last echo was completed 10/10/2019 reports: Normal left ventricle size and systolic function. Ejection fraction was estimated at 55 to 60 %. There were no regional left ventricular wall motion abnormalities and wall thickness was within normal limits. The left atrium is Mildly dilated.  We will continue Lasix 40 mg daily, Norvasc 5 mg daily, Cardizem 60 mg twice daily  8. History of rheumatoid arthritis: Rheumatoid factor positive. Percocet 5-325 mg every 4 hours for moderate to severe pain, however patient states he has not had a bowel movement in greater than 4 days started Colace 100 mg twice daily  9. Morbid obesity: BMI is 42.6.  Weight loss will be encouraged when appropriate  10. Anxiety/depression: Continue BuSpar 10 mg twice daily, Celexa 30 mg daily, Abilify 15 mg daily  11.  History of hyperlipidemia: Continue Lipitor 40 mg nightly  12. History of hypertension: Continue Norvasc 5 mg daily, Lasix 40 mg daily  13. History of chronic gout: Continue allopurinol 500 mg daily  14. History of BPH: Continue Flomax 0.4 mg nightly  15. GI prophylaxis:   Protonix 40 mg daily  16. DVT prophylaxis: Continue Lovenox. Chief Complaint: Hypoxia    Initial H and P:-    **From Chart Review:  \"44year-old male was admitted to [facility] for shortness of breath.  Patient has a significant past medical history for obstructive sleep apnea which uses CPAP machine, obesity, anxiety, depression, hypertension, hyperlipidemia, rheumatoid arthritis, type 2 diabetes and atrial fibrillation. Patient was diagnosed with COVID19  pneumonia and admitted to [facility] last month. Nash Kendall was treated at that time with Decadron, Remdesivir and azithromycin patient was discharged on 8/12/2020.  9/6/2020 patient was brought back to the emergency room department via EMS because he was found to be hypoxic at [address] [place] [address] arrived to the emergency room on a nonrebreather, he was taken off oxygen for short period of time in the emergency room department and oxygen saturation dropped to 81%.  At that time patient was placed back on nonrebreather.  Patient had a low-grade fever of 100. 3.  Patient admits to diarrhea prior to coming to the emergency room department.  Repeat COVID test positive.      Patient reports that after previous admission to [facility] for COVID infection 8/7/20, he returned back to [facility] and was well for about 4 to 5 days.  Patient admits that his roommate at [facility] was sick when he went back patient and he started to develop productive cough with green-yellow thick sputum production.  Since arrival to [facility] and treatment in the ICU patient has denied any cough or sputum production\"    Subjective (past 24 hours):   Patient resting in chair at the time of the interview.   Appears to be sleepy/groggy stating that he had a long morning but was appropriate with his responses. He denies any physical complaints and also denied any chest pain, shortness of breath, nausea, vomiting, abdominal pain, diarrhea, constipation, urinary complaints, lightheadedness, dizziness, headaches, changes in vision, fever, or chills. He was updated on the plan of care, he verbalizes understanding, and had no other needs or questions at this time. Past medical history, family history, social history and allergies reviewed again and is unchanged since admission. ROS (14 point review of systems completed. Pertinent positives noted. Otherwise ROS is negative) :  GENERAL: No fever,chills, or night sweats. SKIN: No lesions or rashes. HEAD: No headaches or recent injury. EYES: No acute changes in vision, no diplopia or blurred vision. EARS: No hearing loss, no tinnitus. NOSE/THROAT: No rhinorrhea or pharyngitis, no nasal drainage. NECK: No lumps or unusual neck stiffness. PULMONARY: Respirations easy and non-labored, no acute distress. CARDIAC: No chest pain, pressure, Negative for lower leg edema. GI: Abdomen is soft and non-tender, non-distended. PERIPHERAL VASCULAR: No intermittent claudication or unusual leg cramps. MUSCULOSKELETAL: Occasional arthralgias, myalgias. NEUROLOGICAL: Denies any headache, near syncope, seizures or syncope. HEMATOLOGIC:  No unusual bruising or bleeding. PSYCH: Denies any homicidal or suicidial ideations.     Medications:  Reviewed    Infusion Medications    dextrose       Scheduled Medications    enoxaparin  1 mg/kg Subcutaneous Q12H    insulin lispro  0-18 Units Subcutaneous TID     insulin lispro  0-9 Units Subcutaneous Nightly    insulin glargine  50 Units Subcutaneous BID    aspirin  81 mg Oral Daily    docusate sodium  100 mg Oral BID    Vitamin D  1,000 Units Oral Daily    danazol  400 mg Oral BID    allopurinol  500 mg Oral Daily    amLODIPine  5 mg Oral Daily    ARIPiprazole  15 mg Oral Daily    atorvastatin  40 mg Oral Nightly    busPIRone  10 mg Oral BID    citalopram  30 mg Oral Daily    dilTIAZem  60 mg Oral BID    folic acid  1 mg Oral Daily    furosemide  40 mg Oral Daily    gabapentin  800 mg Oral BID    hydrALAZINE  50 mg Oral BID    pantoprazole  40 mg Oral Daily    tamsulosin  0.4 mg Oral Nightly    sodium chloride flush  10 mL Intravenous 2 times per day    polyethylene glycol  17 g Oral Every Other Day     PRN Meds: ALPRAZolam, oxyCODONE-acetaminophen, sodium chloride flush, acetaminophen **OR** acetaminophen, promethazine **OR** ondansetron, ipratropium-albuterol, glucose, dextrose, glucagon (rDNA), dextrose      Intake/Output Summary (Last 24 hours) at 9/14/2020 1027  Last data filed at 9/14/2020 0454  Gross per 24 hour   Intake 400 ml   Output 2850 ml   Net -2450 ml       Diet:  DIET CARB CONTROL; Carb Control: 4 carb choices (60 gms)/meal    Exam:  /88   Pulse 60   Temp 98.4 °F (36.9 °C) (Oral)   Resp 22   Ht 5' 8\" (1.727 m)   Wt 280 lb 3.2 oz (127.1 kg)   SpO2 98%   BMI 42.60 kg/m²     General appearance: Alert and appropriate, pleasant morbidly obese adult male. No apparent distress, appears stated age and cooperative. HEENT: Pupils equal, round, and reactive to light. Conjunctivae/corneas clear. Neck: Supple, with full range of motion. No jugular venous distention. Trachea midline. Respiratory:  Normal respiratory effort. Clear to auscultation, bilaterally without Rales/Wheezes/Rhonchi. Cardiovascular: Regular rate and rhythm with normal S1/S2 without murmurs, rubs or gallops. Abdomen: Soft, non-tender, non-distended with normal bowel sounds. Musculoskeletal: Passive and active ROM x 4 extremities. Skin: Skin color, texture, turgor normal.  No rashes or lesions. Neurologic:  Neurovascularly intact without any focal sensory/motor deficits.  Cranial nerves: II-XII intact, grossly non-focal.  Psychiatric: Alert and oriented to person, place, time, and situation. Thought content appropriate, normal insight  Capillary Refill: Brisk,< 3 seconds   Peripheral Pulses: +2 palpable, equal bilaterally     Labs:   Recent Labs     09/12/20  0401 09/13/20  0559 09/14/20  0933   WBC 14.1* 16.8* 14.6*   HGB 13.6* 13.9* 15.1   HCT 42.3 42.8 47.4    327 324     Recent Labs     09/12/20  0401 09/13/20  0559 09/14/20  0933    137 135   K 4.7 4.7 4.6   CL 99 99 97*   CO2 30 29 27   BUN 22 26* 21   CREATININE 0.9 0.9 0.7   CALCIUM 9.4 9.5 9.8     Recent Labs     09/12/20 0401 09/13/20  0559 09/14/20  0933   AST 31 25 40   ALT 61 50 59   BILITOT 0.2* 0.2* 0.3   ALKPHOS 69 64 66     No results for input(s): INR in the last 72 hours. No results for input(s): [place] in the last 72 hours. Microbiology:    Blood culture #1:   Lab Results   Component Value Date    BC No growth-preliminary No growth  09/06/2020    BC No growth-preliminary No growth  09/06/2020       Blood culture #2:No results found for: Sandra Martínez    Organism:No results found for: ORG    No results found for: LABGRAM    MRSA culture only:No results found for: [facility]    Urine culture:   Lab Results   Component Value Date    LABURIN No growth-preliminary No growth  09/06/2020       Respiratory culture: No results found for: CULTRESP    Aerobic and Anaerobic :  No results found for: LABAERO  No results found for: LABANAE    Urinalysis:      Lab Results   Component Value Date    NITRU NEGATIVE 08/05/2020    WBCUA 0-2 08/05/2020    BACTERIA NONE SEEN 08/05/2020    RBCUA 0-2 08/05/2020    BLOODU NEGATIVE 08/05/2020    GLUCOSEU NEGATIVE 08/05/2020       Radiology:  XR CHEST PORTABLE   Final Result   1. Suboptimal inflation of lungs. Borderline heart size. No effusion. 2. Moderate mixed infiltrates scattered throughout both lungs, consistent with pneumonia. 3. Overall appearance slightly improved from prior.             **This report has been created using voice recognition software. It may contain minor errors which are inherent in voice recognition technology. **      Final report electronically signed by Dr. Lyla Reagan on 9/10/2020 8:00 AM      XR CHEST PORTABLE   Final Result   1. Borderline heart size. No effusion. 2. Moderately severe mixed infiltrates scattered throughout both lungs, consistent with pneumonia. 3. Slight improvement since prior study. **This report has been created using voice recognition software. It may contain minor errors which are inherent in voice recognition technology. **      Final report electronically signed by Dr. Lyla Reagan on 9/9/2020 1:44 PM      CT CHEST WO CONTRAST   Final Result   1. There are extensive patchy infiltrates and consolidation throughout both lung fields which are most consolidated within the bilateral perihilar region. There is no pleural effusion. Follow-up chest radiographs recommended to confirm complete    resolution. A follow-up CT examination of the chest in 3 months is also recommended to exclude underlying nodularity. **This report has been created using voice recognition software. It may contain minor errors which are inherent in voice recognition technology. **      Final report electronically signed by Dr. Haylee Roper on 9/6/2020 4:18 PM      XR CHEST PORTABLE   Final Result   1. Soft inflation of lungs. Borderline heart size. 2. Relatively severe mixed infiltrates scattered throughout both lungs consistent with history of COVID 19 infection. . No effusion. **This report has been created using voice recognition software. It may contain minor errors which are inherent in voice recognition technology. **      Final report electronically signed by Dr. Lyla Reagan on 9/6/2020 12:35 PM        Ct Chest Wo Contrast    Result Date: 9/6/2020  PROCEDURE: CT CHEST WO CONTRAST CLINICAL INFORMATION: COVID-19 pneumonia 1 month ago; acute respiratory failure.  COMPARISON: technology. ** Final report electronically signed by Dr. Joe Hinton on 9/6/2020 4:18 PM    Xr Chest Portable    Result Date: 9/6/2020  PROCEDURE: XR CHEST PORTABLE CLINICAL INFORMATION: sob; covid positive COMPARISON: 8/5/2020 TECHNIQUE: A single mobile view of the chest was obtained. 1. Soft inflation of lungs. Borderline heart size. 2. Relatively severe mixed infiltrates scattered throughout both lungs consistent with history of COVID 19 infection. . No effusion. **This report has been created using voice recognition software. It may contain minor errors which are inherent in voice recognition technology. ** Final report electronically signed by Dr. Christyne Cabot on 9/6/2020 12:35 PM      **This report has been created using voice recognition software. It may contain minor errors which are inherent in voice recognition technology. **  Electronically signed by Andee Lennox, APRN - CNP on 9/14/2020 at 10:27 AM

## 2020-09-15 LAB
ALBUMIN SERPL-MCNC: 3.3 G/DL (ref 3.5–5.1)
ALP BLD-CCNC: 66 U/L (ref 38–126)
ALT SERPL-CCNC: 69 U/L (ref 11–66)
ANION GAP SERPL CALCULATED.3IONS-SCNC: 13 MEQ/L (ref 8–16)
AST SERPL-CCNC: 40 U/L (ref 5–40)
BILIRUB SERPL-MCNC: 0.3 MG/DL (ref 0.3–1.2)
BUN BLDV-MCNC: 19 MG/DL (ref 7–22)
CALCIUM SERPL-MCNC: 9.8 MG/DL (ref 8.5–10.5)
CHLORIDE BLD-SCNC: 97 MEQ/L (ref 98–111)
CO2: 29 MEQ/L (ref 23–33)
CREAT SERPL-MCNC: 1.1 MG/DL (ref 0.4–1.2)
GFR SERPL CREATININE-BSD FRML MDRD: 85 ML/MIN/1.73M2
GLUCOSE BLD-MCNC: 186 MG/DL (ref 70–108)
GLUCOSE BLD-MCNC: 211 MG/DL (ref 70–108)
GLUCOSE BLD-MCNC: 220 MG/DL (ref 70–108)
GLUCOSE BLD-MCNC: 225 MG/DL (ref 70–108)
GLUCOSE BLD-MCNC: 263 MG/DL (ref 70–108)
HEMOGLOBIN: 13.7 GM/DL (ref 14–18)
LD: 460 U/L (ref 100–190)
MAGNESIUM: 1.9 MG/DL (ref 1.6–2.4)
POTASSIUM SERPL-SCNC: 4.4 MEQ/L (ref 3.5–5.2)
REASON FOR REJECTION: NORMAL
REJECTED TEST: NORMAL
SCAN OF BLOOD SMEAR: NORMAL
SODIUM BLD-SCNC: 139 MEQ/L (ref 135–145)
TOTAL PROTEIN: 6.6 G/DL (ref 6.1–8)

## 2020-09-15 PROCEDURE — 2060000000 HC ICU INTERMEDIATE R&B

## 2020-09-15 PROCEDURE — 6370000000 HC RX 637 (ALT 250 FOR IP): Performed by: STUDENT IN AN ORGANIZED HEALTH CARE EDUCATION/TRAINING PROGRAM

## 2020-09-15 PROCEDURE — 82948 REAGENT STRIP/BLOOD GLUCOSE: CPT

## 2020-09-15 PROCEDURE — 85018 HEMOGLOBIN: CPT

## 2020-09-15 PROCEDURE — 97535 SELF CARE MNGMENT TRAINING: CPT

## 2020-09-15 PROCEDURE — 99239 HOSP IP/OBS DSCHRG MGMT >30: CPT | Performed by: NURSE PRACTITIONER

## 2020-09-15 PROCEDURE — 97116 GAIT TRAINING THERAPY: CPT

## 2020-09-15 PROCEDURE — 6370000000 HC RX 637 (ALT 250 FOR IP): Performed by: INTERNAL MEDICINE

## 2020-09-15 PROCEDURE — 6360000002 HC RX W HCPCS: Performed by: INTERNAL MEDICINE

## 2020-09-15 PROCEDURE — 2580000003 HC RX 258: Performed by: INTERNAL MEDICINE

## 2020-09-15 PROCEDURE — 36415 COLL VENOUS BLD VENIPUNCTURE: CPT

## 2020-09-15 PROCEDURE — 85025 COMPLETE CBC W/AUTO DIFF WBC: CPT

## 2020-09-15 PROCEDURE — 83735 ASSAY OF MAGNESIUM: CPT

## 2020-09-15 PROCEDURE — 97110 THERAPEUTIC EXERCISES: CPT

## 2020-09-15 PROCEDURE — 83615 LACTATE (LD) (LDH) ENZYME: CPT

## 2020-09-15 PROCEDURE — 80053 COMPREHEN METABOLIC PANEL: CPT

## 2020-09-15 PROCEDURE — 6370000000 HC RX 637 (ALT 250 FOR IP): Performed by: NURSE PRACTITIONER

## 2020-09-15 PROCEDURE — 94761 N-INVAS EAR/PLS OXIMETRY MLT: CPT

## 2020-09-15 RX ORDER — ASCORBIC ACID 500 MG
1000 TABLET ORAL DAILY
Qty: 30 TABLET | Refills: 0 | Status: SHIPPED | OUTPATIENT
Start: 2020-09-15

## 2020-09-15 RX ORDER — ASPIRIN 81 MG/1
81 TABLET, CHEWABLE ORAL DAILY
Qty: 30 TABLET | Refills: 0 | Status: ON HOLD | OUTPATIENT
Start: 2020-09-16 | End: 2020-11-19 | Stop reason: HOSPADM

## 2020-09-15 RX ADMIN — FUROSEMIDE 40 MG: 40 TABLET ORAL at 09:01

## 2020-09-15 RX ADMIN — Medication 10 ML: at 09:08

## 2020-09-15 RX ADMIN — GABAPENTIN 800 MG: 400 CAPSULE ORAL at 09:01

## 2020-09-15 RX ADMIN — Medication 10 ML: at 20:23

## 2020-09-15 RX ADMIN — BUSPIRONE HYDROCHLORIDE 10 MG: 10 TABLET ORAL at 09:01

## 2020-09-15 RX ADMIN — CITALOPRAM 30 MG: 20 TABLET, FILM COATED ORAL at 09:01

## 2020-09-15 RX ADMIN — DANAZOL 400 MG: 200 CAPSULE ORAL at 09:02

## 2020-09-15 RX ADMIN — DANAZOL 400 MG: 200 CAPSULE ORAL at 20:23

## 2020-09-15 RX ADMIN — HYDRALAZINE HYDROCHLORIDE 50 MG: 50 TABLET, FILM COATED ORAL at 09:01

## 2020-09-15 RX ADMIN — ATORVASTATIN CALCIUM 40 MG: 40 TABLET, FILM COATED ORAL at 20:23

## 2020-09-15 RX ADMIN — ARIPIPRAZOLE 15 MG: 15 TABLET ORAL at 09:01

## 2020-09-15 RX ADMIN — ALPRAZOLAM 0.25 MG: 0.25 TABLET ORAL at 20:22

## 2020-09-15 RX ADMIN — HYDRALAZINE HYDROCHLORIDE 50 MG: 50 TABLET, FILM COATED ORAL at 20:23

## 2020-09-15 RX ADMIN — DOCUSATE SODIUM 100 MG: 100 CAPSULE, LIQUID FILLED ORAL at 20:23

## 2020-09-15 RX ADMIN — TAMSULOSIN HYDROCHLORIDE 0.4 MG: 0.4 CAPSULE ORAL at 20:23

## 2020-09-15 RX ADMIN — ALLOPURINOL 500 MG: 300 TABLET ORAL at 09:00

## 2020-09-15 RX ADMIN — DILTIAZEM HYDROCHLORIDE 60 MG: 60 TABLET, FILM COATED ORAL at 09:01

## 2020-09-15 RX ADMIN — ASPIRIN 81 MG: 81 TABLET, CHEWABLE ORAL at 09:07

## 2020-09-15 RX ADMIN — ENOXAPARIN SODIUM 120 MG: 120 INJECTION SUBCUTANEOUS at 20:23

## 2020-09-15 RX ADMIN — INSULIN GLARGINE 50 UNITS: 100 INJECTION, SOLUTION SUBCUTANEOUS at 20:21

## 2020-09-15 RX ADMIN — FOLIC ACID 1 MG: 1 TABLET ORAL at 09:01

## 2020-09-15 RX ADMIN — DOCUSATE SODIUM 100 MG: 100 CAPSULE, LIQUID FILLED ORAL at 09:07

## 2020-09-15 RX ADMIN — ENOXAPARIN SODIUM 120 MG: 120 INJECTION SUBCUTANEOUS at 09:05

## 2020-09-15 RX ADMIN — DILTIAZEM HYDROCHLORIDE 60 MG: 60 TABLET, FILM COATED ORAL at 20:23

## 2020-09-15 RX ADMIN — PANTOPRAZOLE SODIUM 40 MG: 40 TABLET, DELAYED RELEASE ORAL at 09:07

## 2020-09-15 RX ADMIN — Medication 1000 UNITS: at 09:01

## 2020-09-15 RX ADMIN — AMLODIPINE BESYLATE 5 MG: 5 TABLET ORAL at 09:01

## 2020-09-15 RX ADMIN — BUSPIRONE HYDROCHLORIDE 10 MG: 10 TABLET ORAL at 20:23

## 2020-09-15 RX ADMIN — INSULIN GLARGINE 50 UNITS: 100 INJECTION, SOLUTION SUBCUTANEOUS at 09:08

## 2020-09-15 RX ADMIN — GABAPENTIN 800 MG: 400 CAPSULE ORAL at 20:23

## 2020-09-15 ASSESSMENT — PAIN SCALES - GENERAL: PAINLEVEL_OUTOF10: 0

## 2020-09-15 NOTE — PROGRESS NOTES
BED MOBILITY:  Not Tested    TRANSFERS:  Sit to Stand:  Stand By Assistance. From various surfaces  Stand to Sit: Stand By Assistance. To various surfaces     FUNCTIONAL MOBILITY:  Assistive Device: None  Assist Level:  Contact Guard Assistance  Distance: Approx 70 ft x2 trials within hallway   Fair pace, no LOB noted. Can be slightly unsteady at times. Required lengthy seated rest break after each trial of mobility. Patient complained of feeling dizzy and light headed after each trial of mobility. Checked O2 sats after 1st trial with reading of 85% on 2L, recovered quickly to 95%. Checked O2 sats after 2nd trial with reading of 87-88% on 2L. ASSESSMENT:     Activity Tolerance:  Patient tolerance of  treatment: fair. Discharge Recommendations: Continue to assess pending progress, Patient would benefit from continued therapy after discharge   Equipment Recommendations: Other: continue to assess needs  Plan: Times per week: 5x  Current Treatment Recommendations: Strengthening, Balance Training, Functional Mobility Training, Endurance Training, Self-Care / ADL, Safety Education & Training, Patient/Caregiver Education & Training    Patient Education  Patient Education: safety with transfers and mobility, ADL strategies    Goals  Short term goals  Time Frame for Short term goals: Upon Discharge  Short term goal 1: Pt will complete functional mobility progressing to/from BR with SBA to increase indep with accessing evironment for self care. Short term goal 2: Pt will complete dynamic standing task x 4 min with B hand release and SBA to increase endurance required for grooming. Short term goal 3: Pt will complete LB ADL with Natalee to increase indep with self care. Following session, patient left in safe position with all fall risk precautions in place.

## 2020-09-15 NOTE — PROGRESS NOTES
A home oxygen evaluation has been completed. [x]Patient is an inpatient. It is expected that the patient will be discharged within the next 48 hours. Qualified provider to write order for home prescription if patient qualifies. Social service/care managers will arrange for home oxygen. If patient is active, arrange for Home Medical supplier to assess for Oxygen Conserving Device per pulse oximetry. []Patient is an outpatient. Results will be faxed to the ordering provider. Qualified provider to write order for home prescription if patient qualifies and arranges for home oxygen. Patient was placed on room air for 20 minutes. SpO2 was 93 % on room air at rest. Patients SpO2 was 89% or above and did not qualify for home oxygen. Patient was walked for  minutes. SpO2 was 88 % during walking. Patients SpO2 was below 89% and qualified for home oxygen. Oxygen was applied at 6 lpm via nasal cannula to maintain a SpO2 between 90-92% while walking. Actual SpO2 was 91-92 %. If oxygen need is greater than 4 lpm the SpO2 on 4 lpm was 88. Note: For any SpO2 at 18% see policy and procedure for possible qualifications.

## 2020-09-15 NOTE — SIGNIFICANT EVENT
Called to bedside by RN for hemoptysis. Upon exam, patient is asx, he describes PND/nose bleed. He is hemodynamically stable. Will check a hgb, have patient/RN hold pressure, if this fails try nose clip and place NC O2 in the mouth. Will continue lovenox for A.fib. Continue to monitor.

## 2020-09-15 NOTE — PROGRESS NOTES
Upon coming into patient's room, noticed nose bleeding. Patient and day shift RN reported it is due to the dryness from the O2  nasal cannula. Assessed patient, VSS and gave the ordered medications. Patient then starts coughing vigorously and this RN noticed bright red blood in the napkin from coughing with a small clot. Patient stated this had been happening throughout day. Notified [place], [place] through perfect serve, waiting for response and will continue to monitor. [address] at bedside, will have patient hold pressure at bridge of nose for 10 minutes, if unsuccessful this RN will hold pressure for 10 minutes. 2315-This RN did hold pressure at bridge of nose for  10 minutes. Patient coughed up a dime size amount of blood and is now resting in bed comfortably with eyes closed. Bed rails up x2 bed alarm on and all possessions within reach. Respirations 20. Will continue to monitor.

## 2020-09-15 NOTE — PLAN OF CARE
Problem: Airway Clearance - Ineffective  Goal: Achieve or maintain patent airway  Outcome: Ongoing  Note: Patient lung sounds are clear and diminished throughout. PT worked with patient and got dizzy when walking in halls during day today. 3L nasal cannula with O2 above 90%. Problem: Gas Exchange - Impaired  Goal: Absence of hypoxia  Outcome: Ongoing  Note: Shortness of breath when ambulating in room. Problem: Body Temperature -  Risk of, Imbalanced  Goal: Ability to maintain a body temperature within defined limits  Outcome: Ongoing  Note: Patient afebrile throughout shift. Will continue to monitor. Problem: Isolation Precautions - Risk of Spread of Infection  Goal: Prevent transmission of infection  Outcome: Ongoing  Note: Proper PPE worn throughout shift. Problem: Discharge Planning:  Goal: Discharged to appropriate level of care  Description: Discharged to appropriate level of care  Outcome: Ongoing  Note: Discharge plans to go to [facility] apartments with [facility] health discussed with patient. Problem: Pain Control  Goal: Maintain pain level at or below patient's acceptable level (or 5 if patient is unable to determine acceptable level)  Outcome: Ongoing  Note: Denies pain this shift. Will continue to monitor. Problem: Cardiovascular  Goal: No DVT, peripheral vascular complications  Outcome: Ongoing  Note: Patient on lovenox while in hospital. Not given this shift due to patient coughing blood likely from nose bleeds. Problem: Skin Integrity/Risk  Goal: No skin breakdown during hospitalization  Outcome: Ongoing  Note: No areas of new skin breakdown this shift. Patient repositions self and is encouraged to do so every 2 hours. Care plan reviewed with patient. Patient verbalize understanding of the plan of care and contribute to goal setting.

## 2020-09-15 NOTE — PLAN OF CARE
Problem: Airway Clearance - Ineffective  Goal: Achieve or maintain patent airway  9/15/2020 1013 by Paola Vigil RN  Outcome: Ongoing  Note: Pt able to keep airway clear     Problem: Breathing Pattern - Ineffective  Goal: Ability to achieve and maintain a regular respiratory rate  9/15/2020 1013 by Paola Vigil RN  Outcome: Ongoing  Note: Respirations easy and unlabored, dyspnea with exertion noted. Problem: Body Temperature -  Risk of, Imbalanced  Goal: Ability to maintain a body temperature within defined limits  9/15/2020 1013 by Paola Vigil RN  Outcome: Ongoing  Note: Afebrile this shift     Problem: Discharge Planning:  Goal: Discharged to appropriate level of care  Description: Discharged to appropriate level of care  9/15/2020 1013 by Paola Vigil RN  Outcome: Ongoing  Note: [facility] vs home with home health at discharge.  following     Problem: Serum Glucose Level - Abnormal:  Goal: Ability to maintain appropriate glucose levels will improve  Description: Ability to maintain appropriate glucose levels will improve  9/15/2020 1013 by Paola Vigil RN  Outcome: Ongoing  Note: Monitoring blood sugar level before meals and at bedtime. Sliding scale insulin being given.       Problem: Pain Control  Goal: Maintain pain level at or below patient's acceptable level (or 5 if patient is unable to determine acceptable level)  9/15/2020 1013 by Paola Vigil RN  Outcome: Ongoing  Note: Denies any pain this shift     Problem: Cardiovascular  Goal: Hemodynamic stability  9/15/2020 1013 by Paola Vigil RN  Outcome: Ongoing  Note: Vital signs stable, telemetry monitor on      Problem: Respiratory  Goal: O2 Sat > 90%  9/15/2020 1013 by Paola Vigil RN  Outcome: Ongoing  Note: Remains on 2L, needs 6L when walking per home O2 eval from yesterday     Problem: GI  Goal: No bowel complications  7/30/7761 1013 by Paola Vigil RN  Outcome: Ongoing  Note: Denies any diarrhea or constipation     Problem: Nutrition  Goal: Optimal nutrition therapy  9/15/2020 1013 by Marquita Bautista RN  Outcome: Ongoing  Note: Tolerating diet     Problem: Musculor/Skeletal Functional Status  Goal: Absence of falls  9/15/2020 1013 by Marquita Bautista RN  Outcome: Ongoing  Note: Call light in reach, bed alarm on. Up with 1 assist. PT/OT working with patient. Care plan reviewed with patient. Patient verbalize understanding of the plan of care and contribute to goal setting.

## 2020-09-15 NOTE — CARE COORDINATION
9/15/20, 4:34 PM EDT    DISCHARGE PLANNING EVALUATION    4:24 pm-received call from [address] on 6A re: status of patient obtaining key for key to apartment. DASIA advised that SW has not received any calls as of yet. Call to [address] answer-recording stated message stated that office hours were until 4 pm-no message left. Spoke with RN [facility] on 6A to advise her above-provided another number for . Call to number provided-spoke with Pauly Transitions Coordinator with [facility] program who has been arranging services for patient. [address] [place] [address] Rd is not able to provide the requested hours for patient so another agency needs to be secured. She advised that she has the key for patient and has not been able to get it dropped off and asked if patient could be discharged tomorrow. She is concerned about not having additional services in place before he is discharged-staffing issues as a result of COVID. Spoke with [facility] who advised that there is no available transportation until tomorrow-soonest [facility] could do is 2:30 am. Staff will arrange transportation once they know when key is available. Spoke with Ele and advised her that discharge would not be until tomorrow due to transportation issues. She stated that if patient would receive home health for therapy, she would be comfortable with his discharge and would continue to work on securing services. DASIA advised her of home health order for nursing and therapy. She will meet patient at [facility] [address] for 11:30 am.     Spoke with RN [facility] and advised her of the above. Will arrange transport for 11 am.     Call to Amalia Obrien with [facility] re: home health referral. Requested that order be faxed (done).

## 2020-09-15 NOTE — PROGRESS NOTES
[facility] 20  INPATIENT PHYSICAL THERAPY  DAILY NOTE  [facility] 6A [facility] - 6A-18/018-A      Time In: 1020  Time Out: 1045  Timed Code Treatment Minutes: 25 Minutes  Minutes: 25          Date: 9/15/2020  Patient Name: Diana Bailon,  Gender:  male        MRN: [id number]  : 1970  (44 y.o.)     Referring Practitioner: Dr. Laya Frost  Diagnosis: COVID-19 virus infection  Additional Pertinent Hx: admit with above diagnosis, pneumonia, KIARA     Prior Level of Function:  Lives With: Alone  Type of Home: Facility  Home Layout: One level  Home Equipment: (no AD PTA)        ADL Assistance: Independent  Homemaking Responsibilities: No  Ambulation Assistance: Independent  Transfer Assistance: Independent  Additional Comments: was planning to discharge from F soon to [facility] which has a step to enter then an elevator pt can use. Pt has been at [facility] x 1 year. Restrictions/Precautions:  Restrictions/Precautions: General Precautions, Fall Risk  Position Activity Restriction  Other position/activity restrictions: O2    SUBJECTIVE: RN approved session. Pt up in bedside chair at arrival, pleasant and agreeable to session. PAIN: 0/10:     OBJECTIVE:  Bed Mobility:  Not Tested    Transfers:  Sit to Stand: Stand By Assistance, with increased time for completion, cues for hand placement  Stand to Sit:Stand By Assistance, with increased time for completion    Ambulation:  Contact Guard Assistance  Distance: 65ftx1  Surface: Level Tile  Device:No Device  Gait Deviations:  Slow Gracia, Decreased Step Length Bilaterally, Decreased Arm Swing, Decreased Trunk Rotation, Decreased Gait Speed, Decreased Heel Strike Bilaterally, Wide Base of Support and waddling pattern. Balance:  Static Standing Balance: Contact Guard Assistance, ~4mins d/t c/o dizziness, and prior to amb. Exercise:  Patient was guided in supine set(s) 10 reps of exercise to both lower extremities.   Ankle pumps, Glut sets, Quad sets, Heelslides, Short arc quads, Hip abduction/adduction and Straight leg raises. Exercises were completed for increased independence with functional mobility. Functional Outcome Measures: Completed  AM-PAC Inpatient Mobility Raw Score : 18  AM-PAC Inpatient T-Scale Score : 43.63    ASSESSMENT:  Assessment: Patient progressing toward established goals. Activity Tolerance:  Patient tolerance of  treatment: good. O2 monitored during session. Pt on 2L, during amb pt ~86-88%  , at rest and with activity ~95-96% on 2L. Equipment Recommendations: Other: cont to monitor for pt needs  Discharge Recommendations:  (pt would benefit from a skilled therapy stay prior to return to his apartment)    Plan: Times per week: 5X GM  Times per day: Daily  Specific instructions for Next Treatment: therex and mobility    Patient Education  Patient Education: Plan of Care, Transfers, Gait    Goals:  Patient goals : go to apt from hospital next Monday  Short term goals  Time Frame for Short term goals: by discharge  Short term goal 1: bed mobility with MOD I to get in/out of bed  Short term goal 2: transfer with MOD I to get in/out of chairs  Short term goal 3: amb >100'x1 without AD and MOD I to walk safely in apt  Long term goals  Time Frame for Long term goals : no LTGs set secondary to short ELOS    Following session, patient left in safe position with all fall risk precautions in place.

## 2020-09-15 NOTE — CARE COORDINATION
9/15/20, 1:46 PM EDT    DISCHARGE ONGOING EVALUATION:     Rodrigo Lujan day: 9  Location: 6A-18/018-A Reason for admit: COVID-19 virus infection [U07.1]   Treatment Plan of Care: Hospitalist following. Afebrile. O2 down to 2L/nc. Sats 95-97%. PT/OT. Barriers to Discharge: Availability of being able to get into apartment. Home O2 orders. PCP: Saleem Rico DO  Readmission Risk Score: 27%  Patient Goals/Plan/Treatment Preferences: Pt will be discharged to his own apartment at discharge. Planning home with home O2 and home health. Discussed home O2 and where he would want from, pt is agreeable to get home O2 through [facility]. Nursing made aware.

## 2020-09-15 NOTE — DISCHARGE SUMMARY
Hospitalist Discharge Summary        Patient: Mariam Rogel  YOB: 1970  MRN: [id number]   Acct: [de-identified]    Primary Care Physician: Mlidred Glass DO    Admit date  9/6/2020    Discharge date:  9/15/2020 3:07 PM    Chief Complaint on presentation :-  Hypoxia    Discharge Assessment and Plan:-     1. Acute hypoxic respiratory failure: Improved. Secondary to COVID -19 infection. On 3L of oxygen to maintain adequate O2 saturations. Concern for possible sequelae of COVID-19 pneumonia-like organizing pneumonia versus active pneumonia or superimposed bacterial pneumonia.  On arrival pro-Luciano was 0.37, 9/9/20 0. 18. Chest Xray 9/10/20 reports: Suboptimal inflation of lungs.  Borderline heart size.  No effusion.  Moderate mixed infiltrate scattered throughout both lungs, consistent with pneumonia.  Overall appearance slightly improved from prior study. · Home O2 evaluation conducted, patient did not require any oxygen at rest but did however require 6L of oxygen with activity. 2. COVID-19 pneumonia/infection: Patient was diagnosed over a month ago and treated with Decadron, 3-day course of remdesivir, and azithromycin.  Inflammatory markers or elevated, procalcitonin remains low .  On arrival pro-Luciano was 0.37, 9/10/20 0. 19.  Strep pneumonia urine antigen negative, Legionella antigen negative, blood cultures negative x2 preliminary, VRE screen by PCR positive. Continue patient on Remdesivir. Patient received Decadron 6 mg daily for total of 3 doses.  Due to extended half-life of Decadron, patient will continue to receive Decadron anti-inflammatory effects even after completing 3 doses, for a total of 6-8 days of anti- inflammatory effect (today will patient will receive last dose of Decadron). Continue aspirin 81 mg daily for treatment and prevention microthrombosis which is noted to be common in COVID/ARDS conditions.   3. History of vitamin D deficiency: Vitamin D, 25 Hydroxy 40 ng/ml 9/9/20. There is evidence to support that improved with COVID19 infections vitamin D helps breakdown bradykinin and in turn reducing sequelae of COVID19. Continue vitamin D 1000 units daily supplementation at discharge. 4. Atrial fibrillation: Rate controlled - Cardizem 60mg  BID.  Patient was previously on Eliquis however due to weight being greater than 120 kg. Lovenox 120 mg subcutaneous BID is indicated as treatment of choice for anticoagulation; continue at discharge  5. Obstructive sleep apnea: Continue home CPAP at discharge  6. Uncontrolled diabetes mellitus type 2 with hyperglycemia: Uncontrolled likely due to steroid use. Continue Lantus and gabapentin 800 mg twice daily at discharge  7. Chronic diastolic congestive heart failure: Preserved ejection fraction-last echo was completed 10/10/2019 reports: Normal left ventricle size and systolic function. Ejection fraction was estimated at 55 to 60 %. There were no regional left ventricular wall motion abnormalities and wall thickness was within normal limits. The left atrium is Mildly dilated.  Continue Lasix 40 mg daily, Norvasc 5 mg daily, Cardizem 60 mg twice daily at discharge  8. History of rheumatoid arthritis: Rheumatoid factor positive. 9. Morbid obesity: BMI is 42.6.  Weight loss will be encouraged when appropriate  10. Anxiety/depression: Continue BuSpar 10 mg twice daily, Celexa 30 mg daily, Abilify 15 mg daily at discharge  11. History of hyperlipidemia: Continue Lipitor 40 mg nightly at discharge  12. History of hypertension: Continue Norvasc 5 mg daily, Lasix 40 mg daily at discharge  13. History of chronic gout: Continue allopurinol at discharge  14. History of BPH: Continue Flomax at discharge  15. GI prophylaxis: Continue Protonix at discharge  16.  DVT prophylaxis: Continue Lovenox at discharge    Initial H and P and Hospital course:-  **From Chart Review:  \"44year-old male was admitted to [facility] for shortness of breath.  Patient has a significant past medical history for obstructive sleep apnea which uses CPAP machine, obesity, anxiety, depression, hypertension, hyperlipidemia, rheumatoid arthritis, type 2 diabetes and atrial fibrillation. Patient was diagnosed with COVID19  pneumonia and admitted to [facility] last month. Magnolia Hernandez was treated at that time with Decadron, Remdesivir and azithromycin patient was discharged on 8/12/2020.  9/6/2020 patient was brought back to the emergency room department via EMS because he was found to be hypoxic at [address] [place] [address] arrived to the emergency room on a nonrebreather, he was taken off oxygen for short period of time in the emergency room department and oxygen saturation dropped to 81%.  At that time patient was placed back on nonrebreather.  Patient had a low-grade fever of 100. 3.  Patient admits to diarrhea prior to coming to the emergency room department.  Repeat COVID test positive.      Patient reports that after previous admission to [facility] for COVID infection 8/7/20, he returned back to [facility] and was well for about 4 to 5 days.  Patient admits that his roommate at [facility] was sick when he went back patient and he started to develop productive cough with green-yellow thick sputum production.  Since arrival to [facility] and treatment in the ICU patient has denied any cough or sputum production\"    During the patient's stay, he was treated for viral pneumonia with the treatment described above. He was successfully weaned off of oxygen from a stationary standpoint, the patient did still require oxygen with activity/ambulation. Home O2 orders were placed and the patient's previously existing medical conditions were effectively managed during his stay. At the time of discharge, the patient appears well and is alert and oriented to person, place, time, and situation.   He currently denies any chest pain, shortness of breath, nausea, vomiting, abdominal pain, diarrhea, constipation, urinary complaints, headaches, changes in vision, fever, or chills. He was updated on the discharge plan of care, the importance of following up with his PCP, he verbalizes understanding, had no other needs or questions at this time. Physical Exam:-  Vitals:   Patient Vitals for the past 24 hrs:   BP Temp Temp src Pulse Resp SpO2 Weight   09/15/20 1138 102/62 98.6 °F (37 °C) Oral 67 18 97 % --   09/15/20 0856 111/72 98.3 °F (36.8 °C) Oral 66 20 95 % --   09/15/20 0519 139/88 98.4 °F (36.9 °C) Oral 76 18 97 % 281 lb 6.4 oz (127.6 kg)   09/14/20 2321 (!) 130/91 98.3 °F (36.8 °C) Oral 65 15 96 % --   09/14/20 2015 110/77 98.5 °F (36.9 °C) Oral 64 20 97 % --   09/14/20 1650 110/66 98.3 °F (36.8 °C) Oral 68 16 99 % --     Weight:   Weight: 281 lb 6.4 oz (127.6 kg)   24 hour intake/output:     Intake/Output Summary (Last 24 hours) at 9/15/2020 1507  Last data filed at 9/15/2020 0519  Gross per 24 hour   Intake 680 ml   Output --   Net 680 ml     1. General appearance: No apparent distress, appears stated age and cooperative. 2. HEENT: Normal cephalic, atraumatic without obvious deformity. Pupils equal, round, and reactive to light. Extra ocular muscles intact. Conjunctivae/corneas clear. 3. Neck: Supple, with full range of motion. No jugular venous distention. Trachea midline. 4. Respiratory:  Normal respiratory effort. Clear to auscultation, bilaterally without Rales/Wheezes/Rhonchi. 5. Cardiovascular: Regular rate and rhythm with normal S1/S2 without murmurs, rubs or gallops. 6. Abdomen: Soft, non-tender, non-distended with normal bowel sounds. 7. Musculoskeletal:  No clubbing, cyanosis or edema bilaterally. 8. Skin: Skin color, texture, turgor normal.  No rashes or lesions. 9. Neurologic:  Neurovascularly intact without any focal sensory/motor deficits.  Cranial nerves: II-XII intact, grossly non-focal.  10. Psychiatric: Alert and oriented, thought content appropriate, normal insight  11. Capillary Refill: Brisk,< 3 seconds   12. Peripheral Pulses: +2 palpable, equal bilaterally    Discharge Medications:-      Medication List      START taking these medications    aspirin 81 MG chewable tablet  Take 1 tablet by mouth daily  Start taking on:  September 16, 2020     enoxaparin 120 MG/0.8ML injection  Commonly known as:  LOVENOX  Inject 0.87 mLs into the skin every 12 hours        CHANGE how you take these medications    * allopurinol 300 MG tablet  Commonly known as:  ZYLOPRIM  Take 1 tablet by mouth daily  What changed:  how much to take     * allopurinol 100 MG tablet  Commonly known as:  ZYLOPRIM  Take 2 tablets by mouth daily  What changed:  Another medication with the same name was changed. Make sure you understand how and when to take each. vitamin C 500 MG tablet  Commonly known as:  ASCORBIC ACID  Take 2 tablets by mouth daily  What changed:    · how much to take  · when to take this         * This list has 2 medication(s) that are the same as other medications prescribed for you. Read the directions carefully, and ask your doctor or other care provider to review them with you. CONTINUE taking these medications    ABILIFY PO     acetaminophen 325 MG tablet  Commonly known as:  TYLENOL     amLODIPine 10 MG tablet  Commonly known as:  NORVASC     Aspercreme 10 % Lotn  Generic drug:  Trolamine Salicylate  Apply topically as needed for Pain     atorvastatin 40 MG tablet  Commonly known as:  LIPITOR     benzocaine 7.5 % oral gel  Commonly known as:  BABY ORAJEL     busPIRone 10 MG tablet  Commonly known as:  BUSPAR     CELEXA PO     CHOLECALCIFEROL PO     dilTIAZem 60 MG tablet  Commonly known as:  CARDIZEM     esomeprazole Magnesium 20 MG Pack  Commonly known as:  NEXIUM     folic acid 1 MG tablet  Commonly known as:  [address] [place] [address]  Patient needs all supplies for qd testing.  DX: 250.00     furosemide 40 MG tablet  Commonly known as: LASIX     gabapentin 400 MG capsule  Commonly known as:  NEURONTIN     guaiFENesin 100 MG/5ML syrup  Commonly known as:  ROBITUSSIN     hydrALAZINE 50 MG tablet  Commonly known as:  APRESOLINE     insulin glargine 100 UNIT/ML injection vial  Commonly known as:  LANTUS     insulin lispro 100 UNIT/ML injection vial  Commonly known as:  HUMALOG     Janumet  MG per tablet  Generic drug:  sitaGLIPtan-metformin     lidocaine 5 % ointment  Commonly known as:  XYLOCAINE  Apply topically as needed to painful areas on lower back, hips, knees, and feet     MAXORB EX     ondansetron 4 MG tablet  Commonly known as:  ZOFRAN     pantoprazole 40 MG tablet  Commonly known as:  PROTONIX  Take 1 tablet by mouth daily     Percocet 5-325 MG per tablet  Generic drug:  oxyCODONE-acetaminophen     Pinxav Oint     polyethylene glycol 17 GM/SCOOP powder  Commonly known as:  GLYCOLAX     SOBIA-BID PROBIOTIC PO     senna 8.6 MG tablet  Commonly known as:  SENOKOT     tamsulosin 0.4 MG capsule  Commonly known as:  FLOMAX     therapeutic multivitamin-minerals tablet     Valium 5 MG tablet  Generic drug:  diazePAM        STOP taking these medications    Eliquis 5 MG Tabs tablet  Generic drug:  apixaban     predniSONE 20 MG tablet  Commonly known as:  DELTASONE           Where to Get Your Medications      These medications were sent to [address] [place] [address] , [address], [place] .[place] [address]    Phone:  [phone]   · aspirin 81 MG chewable tablet  · enoxaparin 120 MG/0.8ML injection  · vitamin C 500 MG tablet          Labs :-  Recent Results (from the past 72 hour(s))   POCT glucose    Collection Time: 09/12/20  4:42 PM   Result Value Ref Range    POC Glucose 299 (H) 70 - 108 mg/dl   POCT glucose    Collection Time: 09/12/20  9:12 PM   Result Value Ref Range    POC Glucose 343 (H) 70 - 108 mg/dl   CBC auto differential    Collection Time: 09/13/20 5:59 AM   Result Value Ref Range    WBC 16.8 (H) 4.8 - 10.8 thou/mm3    RBC 4.67 (L) 4.70 - 6.10 mill/mm3    Hemoglobin 13.9 (L) 14.0 - 18.0 gm/dl    Hematocrit 42.8 42.0 - 52.0 %    MCV 91.6 80.0 - 94.0 fL    MCH 29.8 26.0 - 33.0 pg    MCHC 32.5 32.2 - 35.5 gm/dl    RDW-CV 15.8 (H) 11.5 - 14.5 %    RDW-SD 51.8 (H) 35.0 - 45.0 fL    Platelets 320 103 - 959 thou/mm3    MPV 11.2 9.4 - 12.4 fL    Seg Neutrophils 76.0 %    Lymphocytes 8.4 %    Monocytes 6.9 %    Eosinophils 0.1 %    Basophils 0.7 %    Immature Granulocytes 7.9 %    Segs Absolute 12.8 (H) 1.8 - 7.7 thou/mm3    Lymphocytes Absolute 1.4 1.0 - 4.8 thou/mm3    Monocytes Absolute 1.2 0.4 - 1.3 thou/mm3    Eosinophils Absolute 0.0 0.0 - 0.4 thou/mm3    Basophils Absolute 0.1 0.0 - 0.1 thou/mm3    Immature Grans (Abs) 1.32 (H) 0.00 - 0.07 thou/mm3    nRBC 1 /100 wbc    Anisocytosis Present Absent    BASOPHILIA 1+ Absent   Comprehensive metabolic panel    Collection Time: 09/13/20  5:59 AM   Result Value Ref Range    Glucose 251 (H) 70 - 108 mg/dL    CREATININE 0.9 0.4 - 1.2 mg/dL    BUN 26 (H) 7 - 22 mg/dL    Sodium 137 135 - 145 meq/L    Potassium 4.7 3.5 - 5.2 meq/L    Chloride 99 98 - 111 meq/L    CO2 29 23 - 33 meq/L    Calcium 9.5 8.5 - 10.5 mg/dL    AST 25 5 - 40 U/L    Alkaline Phosphatase 64 38 - 126 U/L    Total Protein 6.4 6.1 - 8.0 g/dL    Alb 3.2 (L) 3.5 - 5.1 g/dL    Total Bilirubin 0.2 (L) 0.3 - 1.2 mg/dL    ALT 50 11 - 66 U/L   Lactate dehydrogenase    Collection Time: 09/13/20  5:59 AM   Result Value Ref Range     (H) 100 - 190 U/L   Magnesium    Collection Time: 09/13/20  5:59 AM   Result Value Ref Range    Magnesium 2.1 1.6 - 2.4 mg/dL   Anion Gap    Collection Time: 09/13/20  5:59 AM   Result Value Ref Range    Anion Gap 9.0 8.0 - 16.0 meq/L   Glomerular Filtration Rate, Estimated    Collection Time: 09/13/20  5:59 AM   Result Value Ref Range    Est, Glom Filt Rate >90 ml/min/1.73m2   Scan of Blood Smear    Collection Time: 09/13/20 5:59 AM   Result Value Ref Range    SCAN OF BLOOD SMEAR see below    POCT glucose    Collection Time: 09/13/20  9:32 AM   Result Value Ref Range    POC Glucose 231 (H) 70 - 108 mg/dl   POCT glucose    Collection Time: 09/13/20 12:32 PM   Result Value Ref Range    POC Glucose 206 (H) 70 - 108 mg/dl   POCT glucose    Collection Time: 09/13/20  5:35 PM   Result Value Ref Range    POC Glucose 318 (H) 70 - 108 mg/dl   POCT glucose    Collection Time: 09/13/20  9:05 PM   Result Value Ref Range    POC Glucose 277 (H) 70 - 108 mg/dl   POCT glucose    Collection Time: 09/14/20  9:00 AM   Result Value Ref Range    POC Glucose 184 (H) 70 - 108 mg/dl   Magnesium    Collection Time: 09/14/20  9:33 AM   Result Value Ref Range    Magnesium 2.0 1.6 - 2.4 mg/dL   CBC auto differential    Collection Time: 09/14/20  9:33 AM   Result Value Ref Range    WBC 14.6 (H) 4.8 - 10.8 thou/mm3    RBC 5.10 4.70 - 6.10 mill/mm3    Hemoglobin 15.1 14.0 - 18.0 gm/dl    Hematocrit 47.4 42.0 - 52.0 %    MCV 92.9 80.0 - 94.0 fL    MCH 29.6 26.0 - 33.0 pg    MCHC 31.9 (L) 32.2 - 35.5 gm/dl    RDW-CV 16.5 (H) 11.5 - 14.5 %    RDW-SD 55.5 (H) 35.0 - 45.0 fL    Platelets 144 131 - 921 thou/mm3    MPV 11.0 9.4 - 12.4 fL    Seg Neutrophils 65.1 %    Lymphocytes 16.7 %    Monocytes 7.7 %    Eosinophils 0.5 %    Basophils 1.2 %    Immature Granulocytes 8.8 %    Platelet Estimate ADEQUATE Adequate    Segs Absolute 9.5 (H) 1.8 - 7.7 thou/mm3    Lymphocytes Absolute 2.4 1.0 - 4.8 thou/mm3    Monocytes Absolute 1.1 0.4 - 1.3 thou/mm3    Eosinophils Absolute 0.1 0.0 - 0.4 thou/mm3    Basophils Absolute 0.2 (H) 0.0 - 0.1 thou/mm3    Immature Grans (Abs) 1.29 (H) 0.00 - 0.07 thou/mm3    nRBC 1 /100 wbc   Comprehensive metabolic panel    Collection Time: 09/14/20  9:33 AM   Result Value Ref Range    Glucose 216 (H) 70 - 108 mg/dL    CREATININE 0.7 0.4 - 1.2 mg/dL    BUN 21 7 - 22 mg/dL    Sodium 135 135 - 145 meq/L    Potassium 4.6 3.5 - 5.2 meq/L    Chloride 97 (L) 98 - 111 meq/L    CO2 27 23 - 33 meq/L    Calcium 9.8 8.5 - 10.5 mg/dL    AST 40 5 - 40 U/L    Alkaline Phosphatase 66 38 - 126 U/L    Total Protein 6.7 6.1 - 8.0 g/dL    Alb 3.4 (L) 3.5 - 5.1 g/dL    Total Bilirubin 0.3 0.3 - 1.2 mg/dL    ALT 59 11 - 66 U/L   Lactate dehydrogenase    Collection Time: 09/14/20  9:33 AM   Result Value Ref Range     (H) 100 - 190 U/L   Anion Gap    Collection Time: 09/14/20  9:33 AM   Result Value Ref Range    Anion Gap 11.0 8.0 - 16.0 meq/L   Glomerular Filtration Rate, Estimated    Collection Time: 09/14/20  9:33 AM   Result Value Ref Range    Est, Glom Filt Rate >90 ml/min/1.73m2   Scan of Blood Smear    Collection Time: 09/14/20  9:33 AM   Result Value Ref Range    SCAN OF BLOOD SMEAR see below    POCT glucose    Collection Time: 09/14/20  1:00 PM   Result Value Ref Range    POC Glucose 207 (H) 70 - 108 mg/dl   POCT glucose    Collection Time: 09/14/20  5:47 PM   Result Value Ref Range    POC Glucose 250 (H) 70 - 108 mg/dl   POCT glucose    Collection Time: 09/14/20  8:19 PM   Result Value Ref Range    POC Glucose 266 (H) 70 - 108 mg/dl   Hemoglobin    Collection Time: 09/14/20 10:54 PM   Result Value Ref Range    Hemoglobin 14.1 14.0 - 18.0 gm/dl   POCT glucose    Collection Time: 09/15/20  8:33 AM   Result Value Ref Range    POC Glucose 186 (H) 70 - 108 mg/dl   POCT glucose    Collection Time: 09/15/20 12:10 PM   Result Value Ref Range    POC Glucose 225 (H) 70 - 108 mg/dl   SPECIMEN REJECTION    Collection Time: 09/15/20  1:25 PM   Result Value Ref Range    Rejected Test CMP, LD, MG     Reason for Rejection see below         Microbiology:    Blood culture #1:   Lab Results   Component Value Date    BC No growth-preliminary No growth  09/06/2020    BC No growth-preliminary No growth  09/06/2020       Blood culture #2:No results found for: BLOODCULT2    Organism:  No results found for: LABGRAM    MRSA culture only:No results found for: [facility]    Urine culture:   Lab Results Christy Robin DO    Consultations during this hospital stay:-  [] NONE [] Cardiology  [] Nephrology  [] Hemo onco   [] GI   [] ID  [] Endocrine  [] Pulm    [] Neuro    [] Psych   [] Urology  [] ENT   [] G SURGERY   []Ortho    []CV surg    [] Palliative  [] Hospice [] Pain management   [x]    []TCU   [x] PT/OT  OTHERS:- Critical Care    Disposition: home  Condition at Discharge: Stable    Time Spent:- 45 minutes      **This report has been created using voice recognition software. It may contain minor errors which are inherent in voice recognition technology. **  Electronically signed by SANDRA Harp CNP on 9/15/2020 at 3:07 PM  Discharging Hospitalist

## 2020-09-16 VITALS
HEIGHT: 68 IN | HEART RATE: 98 BPM | DIASTOLIC BLOOD PRESSURE: 89 MMHG | SYSTOLIC BLOOD PRESSURE: 138 MMHG | OXYGEN SATURATION: 96 % | BODY MASS INDEX: 42.65 KG/M2 | RESPIRATION RATE: 14 BRPM | WEIGHT: 281.4 LBS | TEMPERATURE: 98 F

## 2020-09-16 LAB
ALBUMIN SERPL-MCNC: 3 G/DL (ref 3.5–5.1)
ALP BLD-CCNC: 63 U/L (ref 38–126)
ALT SERPL-CCNC: 58 U/L (ref 11–66)
ANION GAP SERPL CALCULATED.3IONS-SCNC: 11 MEQ/L (ref 8–16)
AST SERPL-CCNC: 33 U/L (ref 5–40)
BASOPHILIA: ABNORMAL
BASOPHILS # BLD: 0.5 %
BASOPHILS # BLD: 0.6 %
BASOPHILS ABSOLUTE: 0.1 THOU/MM3 (ref 0–0.1)
BASOPHILS ABSOLUTE: 0.1 THOU/MM3 (ref 0–0.1)
BILIRUB SERPL-MCNC: 0.3 MG/DL (ref 0.3–1.2)
BUN BLDV-MCNC: 23 MG/DL (ref 7–22)
CALCIUM SERPL-MCNC: 9.2 MG/DL (ref 8.5–10.5)
CHLORIDE BLD-SCNC: 98 MEQ/L (ref 98–111)
CO2: 27 MEQ/L (ref 23–33)
CREAT SERPL-MCNC: 0.9 MG/DL (ref 0.4–1.2)
EOSINOPHIL # BLD: 0.6 %
EOSINOPHIL # BLD: 0.8 %
EOSINOPHILS ABSOLUTE: 0.1 THOU/MM3 (ref 0–0.4)
EOSINOPHILS ABSOLUTE: 0.1 THOU/MM3 (ref 0–0.4)
ERYTHROCYTE [DISTWIDTH] IN BLOOD BY AUTOMATED COUNT: 16.8 % (ref 11.5–14.5)
ERYTHROCYTE [DISTWIDTH] IN BLOOD BY AUTOMATED COUNT: 17.1 % (ref 11.5–14.5)
ERYTHROCYTE [DISTWIDTH] IN BLOOD BY AUTOMATED COUNT: 56.3 FL (ref 35–45)
ERYTHROCYTE [DISTWIDTH] IN BLOOD BY AUTOMATED COUNT: 57.2 FL (ref 35–45)
GFR SERPL CREATININE-BSD FRML MDRD: > 90 ML/MIN/1.73M2
GLUCOSE BLD-MCNC: 296 MG/DL (ref 70–108)
HCT VFR BLD CALC: 42.2 % (ref 42–52)
HCT VFR BLD CALC: 44.7 % (ref 42–52)
HEMOGLOBIN: 13.6 GM/DL (ref 14–18)
HEMOGLOBIN: 14.5 GM/DL (ref 14–18)
IMMATURE GRANS (ABS): 0.49 THOU/MM3 (ref 0–0.07)
IMMATURE GRANS (ABS): 0.71 THOU/MM3 (ref 0–0.07)
IMMATURE GRANULOCYTES: 3.7 %
IMMATURE GRANULOCYTES: 5.1 %
LD: 477 U/L (ref 100–190)
LYMPHOCYTES # BLD: 13.1 %
LYMPHOCYTES # BLD: 13.8 %
LYMPHOCYTES ABSOLUTE: 1.8 THOU/MM3 (ref 1–4.8)
LYMPHOCYTES ABSOLUTE: 1.8 THOU/MM3 (ref 1–4.8)
MAGNESIUM: 1.9 MG/DL (ref 1.6–2.4)
MCH RBC QN AUTO: 29.8 PG (ref 26–33)
MCH RBC QN AUTO: 29.9 PG (ref 26–33)
MCHC RBC AUTO-ENTMCNC: 32.2 GM/DL (ref 32.2–35.5)
MCHC RBC AUTO-ENTMCNC: 32.4 GM/DL (ref 32.2–35.5)
MCV RBC AUTO: 91.8 FL (ref 80–94)
MCV RBC AUTO: 92.7 FL (ref 80–94)
MONOCYTES # BLD: 6.1 %
MONOCYTES # BLD: 6.5 %
MONOCYTES ABSOLUTE: 0.9 THOU/MM3 (ref 0.4–1.3)
MONOCYTES ABSOLUTE: 0.9 THOU/MM3 (ref 0.4–1.3)
NUCLEATED RED BLOOD CELLS: 0 /100 WBC
NUCLEATED RED BLOOD CELLS: 0 /100 WBC
PATHOLOGIST REVIEW: ABNORMAL
PLATELET # BLD: 342 THOU/MM3 (ref 130–400)
PLATELET # BLD: 344 THOU/MM3 (ref 130–400)
PLATELET ESTIMATE: ADEQUATE
PMV BLD AUTO: 11.3 FL (ref 9.4–12.4)
PMV BLD AUTO: 11.6 FL (ref 9.4–12.4)
POIKILOCYTES: ABNORMAL
POTASSIUM SERPL-SCNC: 4.6 MEQ/L (ref 3.5–5.2)
RBC # BLD: 4.55 MILL/MM3 (ref 4.7–6.1)
RBC # BLD: 4.87 MILL/MM3 (ref 4.7–6.1)
SEG NEUTROPHILS: 74.5 %
SEG NEUTROPHILS: 74.7 %
SEGMENTED NEUTROPHILS ABSOLUTE COUNT: 10.4 THOU/MM3 (ref 1.8–7.7)
SEGMENTED NEUTROPHILS ABSOLUTE COUNT: 9.8 THOU/MM3 (ref 1.8–7.7)
SMUDGE CELLS: PRESENT
SODIUM BLD-SCNC: 136 MEQ/L (ref 135–145)
TOTAL PROTEIN: 6.3 G/DL (ref 6.1–8)
WBC # BLD: 13.1 THOU/MM3 (ref 4.8–10.8)
WBC # BLD: 14 THOU/MM3 (ref 4.8–10.8)

## 2020-09-16 PROCEDURE — 97530 THERAPEUTIC ACTIVITIES: CPT

## 2020-09-16 PROCEDURE — 36415 COLL VENOUS BLD VENIPUNCTURE: CPT

## 2020-09-16 PROCEDURE — 97110 THERAPEUTIC EXERCISES: CPT

## 2020-09-16 PROCEDURE — 83615 LACTATE (LD) (LDH) ENZYME: CPT

## 2020-09-16 PROCEDURE — 6370000000 HC RX 637 (ALT 250 FOR IP): Performed by: INTERNAL MEDICINE

## 2020-09-16 PROCEDURE — 6370000000 HC RX 637 (ALT 250 FOR IP): Performed by: STUDENT IN AN ORGANIZED HEALTH CARE EDUCATION/TRAINING PROGRAM

## 2020-09-16 PROCEDURE — 83735 ASSAY OF MAGNESIUM: CPT

## 2020-09-16 PROCEDURE — 97116 GAIT TRAINING THERAPY: CPT

## 2020-09-16 PROCEDURE — 80053 COMPREHEN METABOLIC PANEL: CPT

## 2020-09-16 PROCEDURE — 85025 COMPLETE CBC W/AUTO DIFF WBC: CPT

## 2020-09-16 RX ADMIN — ALLOPURINOL 500 MG: 300 TABLET ORAL at 09:32

## 2020-09-16 RX ADMIN — FOLIC ACID 1 MG: 1 TABLET ORAL at 09:30

## 2020-09-16 RX ADMIN — AMLODIPINE BESYLATE 5 MG: 5 TABLET ORAL at 09:32

## 2020-09-16 RX ADMIN — DOCUSATE SODIUM 100 MG: 100 CAPSULE, LIQUID FILLED ORAL at 09:32

## 2020-09-16 RX ADMIN — INSULIN GLARGINE 50 UNITS: 100 INJECTION, SOLUTION SUBCUTANEOUS at 10:16

## 2020-09-16 RX ADMIN — ARIPIPRAZOLE 15 MG: 15 TABLET ORAL at 09:32

## 2020-09-16 RX ADMIN — DILTIAZEM HYDROCHLORIDE 60 MG: 60 TABLET, FILM COATED ORAL at 09:30

## 2020-09-16 RX ADMIN — FUROSEMIDE 40 MG: 40 TABLET ORAL at 09:30

## 2020-09-16 RX ADMIN — DANAZOL 400 MG: 200 CAPSULE ORAL at 09:31

## 2020-09-16 RX ADMIN — HYDRALAZINE HYDROCHLORIDE 50 MG: 50 TABLET, FILM COATED ORAL at 09:30

## 2020-09-16 RX ADMIN — Medication 1000 UNITS: at 09:30

## 2020-09-16 RX ADMIN — CITALOPRAM 30 MG: 20 TABLET, FILM COATED ORAL at 09:31

## 2020-09-16 RX ADMIN — GABAPENTIN 800 MG: 400 CAPSULE ORAL at 09:31

## 2020-09-16 RX ADMIN — ASPIRIN 81 MG: 81 TABLET, CHEWABLE ORAL at 09:32

## 2020-09-16 RX ADMIN — BUSPIRONE HYDROCHLORIDE 10 MG: 10 TABLET ORAL at 09:32

## 2020-09-16 NOTE — PROGRESS NOTES
[facility]  INPATIENT PHYSICAL THERAPY  DAILY NOTE  STRZ 6A CAPACITY EBOLA - 6A-18/018-A    Time In: 0730  Time Out: 0810  Timed Code Treatment Minutes: 44 Minutes  Minutes: 40          Date: 2020  Patient Name: Glen Garcia,  Gender:  male        MRN: [id number]  : 1970  (44 y.o.)     Referring Practitioner: Dr. Juan Pruett  Diagnosis: COVID-19 virus infection  Additional Pertinent Hx: admit with above diagnosis, pneumonia, KIARA     Prior Level of Function:  Lives With: Alone  Type of Home: Facility  Home Layout: One level  Home Equipment: (no AD PTA)        ADL Assistance: Independent  Homemaking Responsibilities: No  Ambulation Assistance: Independent  Transfer Assistance: Independent  Additional Comments: was planning to discharge from [facility] soon to apt which has a step to enter then an elevator pt can use. Pt has been at [facility] x 1 year. Restrictions/Precautions:  Restrictions/Precautions: General Precautions, Fall Risk  Position Activity Restriction  Other position/activity restrictions: O2    SUBJECTIVE: States is doing well, ready to get discharged from hospital today    PAIN: -/10: None reported    OBJECTIVE:  Bed Mobility:  Rolling to Left: Contact Guard Assistance, X 1, with head of bed raised   Supine to Sit: Contact Guard Assistance, X 1, with head of bed raised    Transfers:  Sit to Stand: Stand By Assistance, X 1, with increased time for completion, cues for hand placement  Stand to Sit:Stand By Assistance, X 1, with increased time for completion, cues for hand placement, with verbal cues    Ambulation:  Stand By Assistance, X 1, with cues for safety, with verbal cues , cues to manage oxygen tubing  Distance: 70'  Surface: Level Tile  Device:No Device  Gait Deviations:  Decreased Arm Swing, Decreased Trunk Rotation, Decreased Gait Speed and SpO2 decreased to 79% on 1L, increased to 2L with standing rest break increased to 95% within 2'.     Balance:  Static Sitting Balance:  Stand By Assistance, X 1  Dynamic Sitting Balance: Stand By Assistance, X 1, with cues for safety  Static Standing Balance: Contact Guard Assistance, X 1, with cues for safety  Dynamic Standing Balance: Contact Guard Assistance, X 1, with cues for safety, with verbal cues     Exercise:  Patient was guided in 1 set(s) 10 reps of exercise to both lower extremities. Ankle pumps, Glut sets, Quad sets, Seated marches and Long arc quads. Exercises were completed for increased independence with functional mobility. Functional Outcome Measures: Completed  AM-PAC Inpatient Mobility Raw Score : 18  AM-PAC Inpatient T-Scale Score : 43.63    ASSESSMENT:  Assessment: Patient progressing toward established goals. Activity Tolerance:  Patient tolerance of  treatment: fair. Dyspnea on exertion as stated above. Equipment Recommendations: Other: cont to monitor for pt needs  Discharge Recommendations:  (pt would benefit from a skilled therapy stay prior to return to his apartment)    Plan: Times per week: 5X GM  Times per day: Daily  Specific instructions for Next Treatment: therex and mobility    Patient Education  Patient Education: Plan of Care, Home Exercise Program, Transfers, Gait    Goals:  Patient goals : go to apt from hospital next Monday  Short term goals  Time Frame for Short term goals: by discharge  Short term goal 1: bed mobility with MOD I to get in/out of bed  Short term goal 2: transfer with MOD I to get in/out of chairs  Short term goal 3: amb >100'x1 without AD and MOD I to walk safely in apt  Long term goals  Time Frame for Long term goals : no LTGs set secondary to short ELOS    Following session, patient left in safe position with all fall risk precautions in place.

## 2020-09-16 NOTE — DISCHARGE INSTR - COC
Continuity of Care Form    Patient Name: Terra Armendariz   :  1970  MRN:  [id number]    Admit date:  2020  Discharge date:  ***    Code Status Order: Full Code   Advance Directives:   Advance Care Flowsheet Documentation     Date/Time Healthcare Directive Type of Healthcare Directive Copy in [address] Agent's Name Healthcare Agent's Phone Number    20 1324  No, patient does not have an advance directive for healthcare treatment -- -- -- -- --          Admitting Physician:  More Balderrama MD  PCP: Ravi Gomez DO    Discharging Nurse: [facility] Unit/Room#: 6A-18/018-A  Discharging Unit Phone Number: ***    Emergency Contact:   Extended Emergency Contact Information  Primary Emergency Contact: Pedro Wilhelm   [address] Phone: [phone]  Relation: Other    Past Surgical History:  Past Surgical History:   Procedure Laterality Date    FOOT SURGERY Right     , R foot osteomyelitis    HIP SURGERY Left 2020    bilateral hip steroid injection, Left HIP FIRST performed by Luigi Gan MD at [facility]      as a baby       Immunization History: There is no immunization history on file for this patient. Active Problems:  Patient Active Problem List   Diagnosis Code    Chronic diastolic congestive heart failure (HCC) I50.32    Atrial fibrillation (HCC) I48.91    BPH (benign prostatic hyperplasia) N40.0    Carpal tunnel syndrome on right G56.01    Chronic gout M1A. 9XX0    DM2 (diabetes mellitus, type 2) ([facility] [address] E11.9    Heart failure with preserved ejection fraction (HCC) I50.30    History of alcohol abuse F10.11    History of osteomyelitis Z87.39    Hypertension, essential I10    Hypogonadism, male E29.1    Major depression F32.9    Morbid obesity (HCC) E66.01    DURAN (nonalcoholic steatohepatitis) K75.81    KIARA (obstructive sleep apnea) G47.33    Vitamin D deficiency E55.9    Normocytic anemia D64.9    Physical deconditioning R53.81    Mood disorder (HCC) F39    Hallucinations R44.3    GERD (gastroesophageal reflux disease) K21.9    Wound of buttock S31.809A    Chest wall pain with tenderness R07.89    Primary osteoarthritis of left hip M16.12    COVID-19 U07.1    COVID-19 virus infection U07.1       Isolation/Infection:   Isolation          Droplet Plus  Contact        Patient Infection Status     Infection Onset Added Last Indicated Last Indicated By Review Planned Expiration Resolved Resolved By    MRSA 09/06/20 09/06/20 09/06/20 MRSA by PCR        Nares 9/2020    VRE 09/06/20 09/06/20 09/06/20 VRE Screen by PCR        PCR 9/2020    COVID-19 08/05/20 08/05/20 09/06/20 COVID-19 09/13/20 09/30/20      Resolved    COVID-19 Rule Out 08/05/20 08/05/20 08/05/20 COVID-19 (Ordered)   08/05/20 Rule-Out Test Resulted    COVID-19 Rule Out 06/23/20 06/23/20 06/23/20 COVID-19 (Ordered)   06/24/20 Rule-Out Test Resulted          Nurse Assessment:  Last Vital Signs: BP (!) 134/99   Pulse 72   Temp 97.6 °F (36.4 °C) (Oral)   Resp 24   Ht 5' 8\" (1.727 m)   Wt 281 lb 6.4 oz (127.6 kg)   SpO2 95%   BMI 42.79 kg/m²     Last documented pain score (0-10 scale): Pain Level: 0  Last Weight:   Wt Readings from Last 1 Encounters:   09/15/20 281 lb 6.4 oz (127.6 kg)     Mental Status:  oriented    IV Access:  - None    Nursing Mobility/ADLs:  Walking   Independent  Transfer  Independent  Bathing  Independent  Dressing  Independent  Toileting  Independent  Feeding  Independent  Med Admin  Independent  Med Delivery   none    Wound Care Documentation and Therapy:  Wound 09/06/20 Hip Left healing pressure ulcer (Active)   Wound Pressure Stage  3 09/14/20 0454   Dressing/Treatment Open to air 09/16/20 0413   Wound Assessment Clean;Dry;Pink 09/16/20 0413   Sarahi-wound Assessment Blanchable erythema 09/13/20 2053   Number of days: 10        Elimination:  Continence:   · Bowel: No  · Bladder: No  Urinary Catheter: None   Colostomy/Ileostomy/Ileal Conduit: No       Date of Last BM: ***    Intake/Output Summary (Last 24 hours) at 9/16/2020 0808  Last data filed at 9/16/2020 0413  Gross per 24 hour   Intake 700 ml   Output --   Net 700 ml     I/O last 3 completed shifts: In: 700 [P.O.:700]  Out: -     Safety Concerns:     None    Impairments/Disabilities:      None    Nutrition Therapy:  Current Nutrition Therapy:   - Oral Diet:  Carb Control 4 carbs/meal (1800kcals/day)    Routes of Feeding: Oral  Liquids: No Restrictions  Daily Fluid Restriction: no  Last Modified Barium Swallow with Video (Video Swallowing Test): not done    Treatments at the Time of Hospital Discharge:   Respiratory Treatments: O2 Use  Oxygen Therapy:  is on oxygen at 2 L/min per nasal cannula.   Ventilator:    - No ventilator support    Rehab Therapies: Physical Therapy and Occupational Therapy  Weight Bearing Status/Restrictions: No weight bearing restirctions  Other Medical Equipment (for information only, NOT a DME order):  None  Other Treatments: None    Patient's personal belongings (please select all that are sent with patient):  None    RN SIGNATURE:  Electronically signed by August Soria RN on 9/16/20 at 8:12 AM EDT    CASE MANAGEMENT/SOCIAL WORK SECTION    Inpatient Status Date: ***    Readmission Risk Assessment Score:  Readmission Risk              Risk of Unplanned Readmission:        33           Discharging to Facility/ Agency   · Name:   · Address:  · Phone:  · Fax:    Dialysis Facility (if applicable)   · Name:  · Address:  · Dialysis Schedule:  · Phone:  · Fax:    / signature: {Esignature:[id number]}    PHYSICIAN SECTION    Prognosis: {Prognosis:1003652529}    Condition at Discharge: [facility] Patient Condition:702950776}    Rehab Potential (if transferring to Rehab): {Prognosis:0468245594}    Recommended Labs or Other Treatments After Discharge: ***    Physician Certification: I certify the above information and transfer of Garima Paris  is necessary for the continuing treatment of the diagnosis listed and that he requires {Admit to Appropriate Level of Care:88193} for {GREATER/LESS:013481097} 30 days.      Update Admission H&P: [facility]}    PHYSICIAN SIGNATURE:  {Esignature:[id number]}

## 2020-09-16 NOTE — DISCHARGE INSTR - OTHER ORDERS
Patient discharged to new apartment for living arrangements. Patients  planning on arriving at approximately 10 this morning for transport. Patients chart broken down and placed in the yellow bin.

## 2020-09-16 NOTE — PLAN OF CARE
Problem: Airway Clearance - Ineffective  Goal: Achieve or maintain patent airway  9/15/2020 2044 by Myriam Machado RN  Outcome: Ongoing  Note: Lungs clear/diminished in bases     Problem: Gas Exchange - Impaired  Goal: Absence of hypoxia  Outcome: Ongoing  Note: 02 sats 92-95% on room air while in bed. 02 needed at 6L/min nasal cannula while ambulating     Problem: Gas Exchange - Impaired  Goal: Promote optimal lung function  Outcome: Ongoing  Note: Patient gets dyspnea with exertion, home 02 ordered while ambulating     Problem: Breathing Pattern - Ineffective  Goal: Ability to achieve and maintain a regular respiratory rate  9/15/2020 2044 by Myriam Machado RN  Outcome: Ongoing  Note: Respiratory rate elevated, 22 at rest     Problem: Body Temperature -  Risk of, Imbalanced  Goal: Ability to maintain a body temperature within defined limits  9/15/2020 2044 by Myriam Machado RN  Outcome: Ongoing  Note: Patient afebrile this shift. Problem: Isolation Precautions - Risk of Spread of Infection  Goal: Prevent transmission of infection  Outcome: Ongoing  Note: Patient in droplet plus precautions     Problem: Discharge Planning:  Goal: Discharged to appropriate level of care  Description: Discharged to appropriate level of care  9/15/2020 2044 by Myriam Machado RN  Outcome: Ongoing  Note: Patient to be discharged home with family support and home health. Problem: Serum Glucose Level - Abnormal:  Goal: Ability to maintain appropriate glucose levels will improve  Description: Ability to maintain appropriate glucose levels will improve  9/15/2020 2044 by Myriam Machado RN  Outcome: Ongoing  Note: Blood sugar elevated, sliding scale and lantus given per order.       Problem: Pain Control  Goal: Maintain pain level at or below patient's acceptable level (or 5 if patient is unable to determine acceptable level)  9/15/2020 2044 by Myriam Machado RN  Outcome: Ongoing  Flowsheets (Taken 9/15/2020 0856 by Mitchell Gonzalez Annalee Schaumann, RN)  Patient's Stated Pain Goal: No pain  Note: Patient denies pain at this time. Meeting pain goal of 0/10 with no interventions. Problem: Cardiovascular  Goal: No DVT, peripheral vascular complications  Outcome: Ongoing  Note: Patient free from signs of dvt this shift. No pain, warmth or redness to calves. Lovenox and kristina hose in use. Problem: GI  Goal: No bowel complications  6/29/5070 2044 by Oliver Schilder, RN  Outcome: Ongoing  Note: Abdomen soft/nontender, bowel sounds active. Patient is passing gas, no bowel movement this shift. Denies nausea. Problem:   Goal: Adequate urinary output  Outcome: Ongoing  Note: Patient voiding at least 30 ml/hr      Problem: Nutrition  Goal: Optimal nutrition therapy  9/15/2020 2044 by Oliver Schilder, RN  Outcome: Ongoing  Note: Patient tolerating carb control diet     Problem: Skin Integrity/Risk  Goal: No skin breakdown during hospitalization  Outcome: Ongoing  Note: Patient has healing wound to left hip-open to air with no drainage     Problem: Musculor/Skeletal Functional Status  Goal: Absence of falls  9/15/2020 2044 by Oliver Schilder, RN  Outcome: Ongoing  Note: Patient free from falls this shift. Gait steady with 1 assist. Alert and oriented x 4, using call light appropriately. Care plan reviewed with patient. Patient verbalized understanding of the plan of care and contribute to goal setting.

## 2020-09-16 NOTE — CARE COORDINATION
9/16/20, 9:22 AM EDT    Patient goals/plan/ treatment preferences discussed by  and . Patient goals/plan/ treatment preferences reviewed with patient/ family. Patient/ family verbalize understanding of discharge plan and are in agreement with goal/plan/treatment preferences. Understanding was demonstrated using the teach back method. AVS provided by RN at time of discharge, which includes all necessary medical information pertaining to the patients current course of illness, treatment, post-discharge goals of care, and treatment preferences. Services After Discharge  Services At/After Discharge: Aide Services, Nursing Services, [place], PT([facility])   IMM Letter  IMM Letter given to Patient/Family/Significant other/Guardian/POA/by[de-identified]   IMM Letter date given[de-identified] 09/14/20  IMM Letter time given[de-identified] [phone]       Patient discharged to [place] [address] from [facility], nursing, aide, PT, OT & SW. His Mita Ortega will provide transportation. Sabiha from [facility] notified discharge. 9:40 AM  Received call from [address] [place] [address] at [facility], patient's insurance is out of network with [facility]. Spoke with marleny Boggs and patient, would like [facility]. Spoke with Noemi Ivey at [facility], made referral for nursing, aide, PT, OT & SW.  Requested services be started as soon as possible.

## 2020-09-16 NOTE — PROGRESS NOTES
1100- Patient transport on unit. Patient assisted from room. O2 transferred over to patients home supply. Patient taken downstairs to awaiting . Discharge paperwork signed and copy placed with chart in yellow bin.

## 2020-09-17 ENCOUNTER — CARE COORDINATION (OUTPATIENT)
Dept: CASE MANAGEMENT | Age: 50
End: 2020-09-17

## 2020-09-17 NOTE — CARE COORDINATION
Called pt for COVID follow up. COVID test POSITIVE  8/5/20,  9/6/20    Advance Care Planning  People with COVID-19 may have no symptoms, mild symptoms, such as fever, cough, and shortness of breath or they may have more severe illness, developing severe and fatal pneumonia. As a result, Advance Care Planning with attention to naming a health care decision maker (someone you trust to make healthcare decisions for you if you could not speak for yourself) and sharing other health care preferences is important BEFORE a possible health crisis. Please contact your Primary Care Provider to discuss Advance Care Planning. Preventing the Spread of Coronavirus Disease 2019 in Homes and Residential Communities  For the most recent information go to [email or web address]    Prevention steps for People with confirmed or suspected COVID-19 (including persons under investigation) who do not need to be hospitalized  and   People with confirmed COVID-19 who were hospitalized and determined to be medically stable to go home    Your healthcare provider and public health staff will evaluate whether you can be cared for at home. If it is determined that you do not need to be hospitalized and can be isolated at home, you will be monitored by staff from your local or state health department. You should follow the prevention steps below until a healthcare provider or local or state health department says you can return to your normal activities. Stay home except to get medical care  People who are mildly ill with COVID-19 are able to isolate at home during their illness. You should restrict activities outside your home, except for getting medical care. Do not go to work, school, or public areas. Avoid using public transportation, ride-sharing, or taxis.   Separate yourself from other people and animals in your home  People: As much as possible, you should stay in a specific room and away from other people in your home. Also, you should use a separate bathroom, if available. Animals: You should restrict contact with pets and other animals while you are sick with COVID-19, just like you would around other people. Although there have not been reports of pets or other animals becoming sick with COVID-19, it is still recommended that people sick with COVID-19 limit contact with animals until more information is known about the virus. When possible, have another member of your household care for your animals while you are sick. If you are sick with COVID-19, avoid contact with your pet, including petting, snuggling, being kissed or licked, and sharing food. If you must care for your pet or be around animals while you are sick, wash your hands before and after you interact with pets and wear a facemask. Call ahead before visiting your doctor  If you have a medical appointment, call the healthcare provider and tell them that you have or may have COVID-19. This will help the healthcare providers office take steps to keep other people from getting infected or exposed. Wear a facemask  You should wear a facemask when you are around other people (e.g., sharing a room or vehicle) or pets and before you enter a healthcare providers office. If you are not able to wear a facemask (for example, because it causes trouble breathing), then people who live with you should not stay in the same room with you, or they should wear a facemask if they enter your room. Cover your coughs and sneezes  Cover your mouth and nose with a tissue when you cough or sneeze. Throw used tissues in a lined trash can. Immediately wash your hands with soap and water for at least 20 seconds or, if soap and water are not available, clean your hands with an alcohol-based hand  that contains at least 60% alcohol.   Clean your hands often  Wash your hands often with soap and water for at least 20 seconds, especially after blowing your nose, coughing, or sneezing; going to the bathroom; and before eating or preparing food. If soap and water are not readily available, use an alcohol-based hand  with at least 60% alcohol, covering all surfaces of your hands and rubbing them together until they feel dry. Soap and water are the best option if hands are visibly dirty. Avoid touching your eyes, nose, and mouth with unwashed hands. Avoid sharing personal household items  You should not share dishes, drinking glasses, cups, eating utensils, towels, or bedding with other people or pets in your home. After using these items, they should be washed thoroughly with soap and water. Clean all high-touch surfaces everyday  High touch surfaces include counters, tabletops, doorknobs, bathroom fixtures, toilets, phones, keyboards, tablets, and bedside tables. Also, clean any surfaces that may have blood, stool, or body fluids on them. Use a household cleaning spray or wipe, according to the label instructions. Labels contain instructions for safe and effective use of the cleaning product including precautions you should take when applying the product, such as wearing gloves and making sure you have good ventilation during use of the product. Monitor your symptoms  Seek prompt medical attention if your illness is worsening (e.g., difficulty breathing). Before seeking care, call your healthcare provider and tell them that you have, or are being evaluated for, COVID-19. Put on a facemask before you enter the facility. These steps will help the healthcare providers office to keep other people in the office or waiting room from getting infected or exposed. Ask your healthcare provider to call the local or [facility] health department. Persons who are placed under active monitoring or facilitated self-monitoring should follow instructions provided by their local health department or occupational health professionals, as appropriate.  When working with your local health department check their available hours. If you have a medical emergency and need to call 911, notify the dispatch personnel that you have, or are being evaluated for COVID-19. If possible, put on a facemask before emergency medical services arrive. Discontinuing home isolation  Patients with confirmed COVID-19 should remain under home isolation precautions until the risk of secondary transmission to others is thought to be low. The decision to discontinue home isolation precautions should be made on a case-by-case basis, in consultation with healthcare providers and state and local health departments. Pt voiced understanding on the info above. Pt stated he is much better than when he was admitted. Pt denied any fever chills, sweating or cough. Pt stated he only wears the O2 when he is up in the apartment    Reviewed new med with the pt, has not picked up the meds yet. Pt stated he does not know how to give himself the Lovenox injection. Notified [facility] of discharge & he need instructions on the injection.     Pt declined LOOP program    Rachel Vásquez RN  Care Transition Nurse  [phone]

## 2020-09-17 NOTE — CARE COORDINATION
Late Entry:    [facility] notified of pt's discharge.      Electronically signed by Ford Sy RN on 9/17/2020 at 11:27 AM

## 2020-09-17 NOTE — PROGRESS NOTES
CLINICAL PHARMACY NOTE: MEDS TO [address] Select Patient?: No  Total # of Prescriptions Filled: 3   The following medications were delivered to the patient:  Aspirin 81mg chewable  Enoxaparin 120mg/0.8ml  Vitamin C-Rosehips 1000mg  Total # of Interventions Completed: 2  Time Spent (min): 30    Additional Documentation:

## 2020-09-21 NOTE — CARE COORDINATION
Entered patient's chart at the request of Scotty Hu from [facility] who is trying to locate patient's medications. She states he was supposed to be taking lovenox and said the hospital never gave it to him. Spoke with RN on 6A who states she believes they were sent back to OP pharmacy. Soke with Jerry Arora in [facility], she has his meds and is planning on sending them to patient via fed-ex today. Updated Scotty Hu.  Electronically signed by Levon Brittle, RN on 9/21/2020 at 12:40 PM

## 2020-09-23 ENCOUNTER — APPOINTMENT (OUTPATIENT)
Dept: GENERAL RADIOLOGY | Age: 50
DRG: 870 | End: 2020-09-23
Payer: MEDICARE

## 2020-09-23 ENCOUNTER — HOSPITAL ENCOUNTER (INPATIENT)
Age: 50
LOS: 44 days | Discharge: NURSING, REHAB OR LONG-TERM CARE | DRG: 870 | End: 2020-11-06
Attending: FAMILY MEDICINE | Admitting: HOSPITALIST
Payer: MEDICARE

## 2020-09-23 LAB
ALLEN TEST: POSITIVE
ALLEN TEST: POSITIVE
AMORPHOUS: ABNORMAL
ANION GAP SERPL CALCULATED.3IONS-SCNC: 15 MEQ/L (ref 8–16)
ANION GAP SERPL CALCULATED.3IONS-SCNC: 16 MEQ/L (ref 8–16)
APTT: 30.2 SECONDS (ref 22–38)
BACTERIA: ABNORMAL
BASE EXCESS (CALCULATED): 3.4 MMOL/L (ref -2.5–2.5)
BASE EXCESS (CALCULATED): 3.4 MMOL/L (ref -2.5–2.5)
BASOPHILS # BLD: 0.3 %
BASOPHILS ABSOLUTE: 0 THOU/MM3 (ref 0–0.1)
BETA-HYDROXYBUTYRATE: 22.95 MG/DL (ref 0.2–2.81)
BILIRUBIN URINE: NEGATIVE
BLOOD, URINE: NEGATIVE
BUN BLDV-MCNC: 16 MG/DL (ref 7–22)
BUN BLDV-MCNC: 18 MG/DL (ref 7–22)
C-REACTIVE PROTEIN: 25.1 MG/DL (ref 0–1)
CALCIUM SERPL-MCNC: 9.1 MG/DL (ref 8.5–10.5)
CALCIUM SERPL-MCNC: 9.6 MG/DL (ref 8.5–10.5)
CASTS: ABNORMAL /LPF
CASTS: ABNORMAL /LPF
CHARACTER, URINE: CLEAR
CHLORIDE BLD-SCNC: 104 MEQ/L (ref 98–111)
CHLORIDE BLD-SCNC: 104 MEQ/L (ref 98–111)
CO2: 23 MEQ/L (ref 23–33)
CO2: 26 MEQ/L (ref 23–33)
COLLECTED BY:: ABNORMAL
COLLECTED BY:: ABNORMAL
COLOR: YELLOW
CREAT SERPL-MCNC: 1.1 MG/DL (ref 0.4–1.2)
CREAT SERPL-MCNC: 1.4 MG/DL (ref 0.4–1.2)
CREATININE URINE: 155.6 MG/DL
CRYSTALS: ABNORMAL
D-DIMER QUANTITATIVE: 1540 NG/ML FEU (ref 0–500)
DEVICE: ABNORMAL
DEVICE: ABNORMAL
EKG ATRIAL RATE: 86 BPM
EKG ATRIAL RATE: 97 BPM
EKG P AXIS: 43 DEGREES
EKG P AXIS: 45 DEGREES
EKG P-R INTERVAL: 132 MS
EKG P-R INTERVAL: 142 MS
EKG Q-T INTERVAL: 310 MS
EKG Q-T INTERVAL: 388 MS
EKG QRS DURATION: 74 MS
EKG QRS DURATION: 76 MS
EKG QTC CALCULATION (BAZETT): 393 MS
EKG QTC CALCULATION (BAZETT): 464 MS
EKG R AXIS: -19 DEGREES
EKG R AXIS: -8 DEGREES
EKG T AXIS: -11 DEGREES
EKG T AXIS: 58 DEGREES
EKG VENTRICULAR RATE: 86 BPM
EKG VENTRICULAR RATE: 97 BPM
EOSINOPHIL # BLD: 1.2 %
EOSINOPHILS ABSOLUTE: 0.1 THOU/MM3 (ref 0–0.4)
EPITHELIAL CELLS, UA: ABNORMAL /HPF
ERYTHROCYTE [DISTWIDTH] IN BLOOD BY AUTOMATED COUNT: 18.6 % (ref 11.5–14.5)
ERYTHROCYTE [DISTWIDTH] IN BLOOD BY AUTOMATED COUNT: 64 FL (ref 35–45)
FLU A ANTIGEN: NEGATIVE
FLU B ANTIGEN: NEGATIVE
GFR SERPL CREATININE-BSD FRML MDRD: 64 ML/MIN/1.73M2
GFR SERPL CREATININE-BSD FRML MDRD: 85 ML/MIN/1.73M2
GLUCOSE BLD-MCNC: 351 MG/DL (ref 70–108)
GLUCOSE BLD-MCNC: 395 MG/DL (ref 70–108)
GLUCOSE, URINE: >= 1000 MG/DL
HCO3: 30 MMOL/L (ref 23–28)
HCO3: 31 MMOL/L (ref 23–28)
HCT VFR BLD CALC: 36.8 % (ref 42–52)
HEMOGLOBIN: 11.3 GM/DL (ref 14–18)
IFIO2: 5
IMMATURE GRANS (ABS): 0.06 THOU/MM3 (ref 0–0.07)
IMMATURE GRANULOCYTES: 0.5 %
INR BLD: 1.17 (ref 0.85–1.13)
KETONES, URINE: NEGATIVE
LACTIC ACID: 0.8 MMOL/L (ref 0.5–2.2)
LD: 545 U/L (ref 100–190)
LEUKOCYTE ESTERASE, URINE: NEGATIVE
LYMPHOCYTES # BLD: 10 %
LYMPHOCYTES ABSOLUTE: 1.1 THOU/MM3 (ref 1–4.8)
MAGNESIUM: 1.8 MG/DL (ref 1.6–2.4)
MCH RBC QN AUTO: 29 PG (ref 26–33)
MCHC RBC AUTO-ENTMCNC: 30.7 GM/DL (ref 32.2–35.5)
MCV RBC AUTO: 94.6 FL (ref 80–94)
MISCELLANEOUS LAB TEST RESULT: ABNORMAL
MONOCYTES # BLD: 13.2 %
MONOCYTES ABSOLUTE: 1.5 THOU/MM3 (ref 0.4–1.3)
NITRITE, URINE: NEGATIVE
NUCLEATED RED BLOOD CELLS: 0 /100 WBC
O2 SATURATION: 91 %
O2 SATURATION: 95 %
OSMOLALITY CALCULATION: 308.9 MOSMOL/KG (ref 275–300)
OSMOLALITY URINE: 713 MOSMOL/KG (ref 250–750)
PCO2: 56 MMHG (ref 35–45)
PCO2: 59 MMHG (ref 35–45)
PH BLOOD GAS: 7.33 (ref 7.35–7.45)
PH BLOOD GAS: 7.34 (ref 7.35–7.45)
PH UA: 5 (ref 5–9)
PLATELET # BLD: 291 THOU/MM3 (ref 130–400)
PMV BLD AUTO: 11.1 FL (ref 9.4–12.4)
PO2: 66 MMHG (ref 71–104)
PO2: 83 MMHG (ref 71–104)
POTASSIUM SERPL-SCNC: 4.8 MEQ/L (ref 3.5–5.2)
POTASSIUM SERPL-SCNC: 4.9 MEQ/L (ref 3.5–5.2)
PRO-BNP: 745.5 PG/ML (ref 0–900)
PROCALCITONIN: 0.64 NG/ML (ref 0.01–0.09)
PROTEIN UA: 100 MG/DL
RBC # BLD: 3.89 MILL/MM3 (ref 4.7–6.1)
RBC URINE: ABNORMAL /HPF
RENAL EPITHELIAL, UA: ABNORMAL
SEG NEUTROPHILS: 74.8 %
SEGMENTED NEUTROPHILS ABSOLUTE COUNT: 8.5 THOU/MM3 (ref 1.8–7.7)
SODIUM BLD-SCNC: 142 MEQ/L (ref 135–145)
SODIUM BLD-SCNC: 146 MEQ/L (ref 135–145)
SODIUM URINE: 62 MEQ/L
SOURCE, BLOOD GAS: ABNORMAL
SOURCE, BLOOD GAS: ABNORMAL
SPECIFIC GRAVITY UA: > 1.03 (ref 1–1.03)
TROPONIN T: 0.03 NG/ML
UROBILINOGEN, URINE: 1 EU/DL (ref 0–1)
WBC # BLD: 11.3 THOU/MM3 (ref 4.8–10.8)
WBC UA: ABNORMAL /HPF
YEAST: ABNORMAL

## 2020-09-23 PROCEDURE — 6360000002 HC RX W HCPCS

## 2020-09-23 PROCEDURE — 93005 ELECTROCARDIOGRAM TRACING: CPT | Performed by: HOSPITALIST

## 2020-09-23 PROCEDURE — 89220 SPUTUM SPECIMEN COLLECTION: CPT

## 2020-09-23 PROCEDURE — 83615 LACTATE (LD) (LDH) ENZYME: CPT

## 2020-09-23 PROCEDURE — 94640 AIRWAY INHALATION TREATMENT: CPT

## 2020-09-23 PROCEDURE — 93005 ELECTROCARDIOGRAM TRACING: CPT | Performed by: FAMILY MEDICINE

## 2020-09-23 PROCEDURE — 83605 ASSAY OF LACTIC ACID: CPT

## 2020-09-23 PROCEDURE — 2580000003 HC RX 258: Performed by: HOSPITALIST

## 2020-09-23 PROCEDURE — 83935 ASSAY OF URINE OSMOLALITY: CPT

## 2020-09-23 PROCEDURE — 36600 WITHDRAWAL OF ARTERIAL BLOOD: CPT

## 2020-09-23 PROCEDURE — 87798 DETECT AGENT NOS DNA AMP: CPT

## 2020-09-23 PROCEDURE — 94761 N-INVAS EAR/PLS OXIMETRY MLT: CPT

## 2020-09-23 PROCEDURE — 87581 M.PNEUMON DNA AMP PROBE: CPT

## 2020-09-23 PROCEDURE — 5A1955Z RESPIRATORY VENTILATION, GREATER THAN 96 CONSECUTIVE HOURS: ICD-10-PCS | Performed by: INTERNAL MEDICINE

## 2020-09-23 PROCEDURE — 2580000003 HC RX 258: Performed by: FAMILY MEDICINE

## 2020-09-23 PROCEDURE — 94002 VENT MGMT INPAT INIT DAY: CPT

## 2020-09-23 PROCEDURE — 2700000000 HC OXYGEN THERAPY PER DAY

## 2020-09-23 PROCEDURE — 87541 LEGION PNEUMO DNA AMP PROB: CPT

## 2020-09-23 PROCEDURE — 99285 EMERGENCY DEPT VISIT HI MDM: CPT

## 2020-09-23 PROCEDURE — 87205 SMEAR GRAM STAIN: CPT

## 2020-09-23 PROCEDURE — U0002 COVID-19 LAB TEST NON-CDC: HCPCS

## 2020-09-23 PROCEDURE — 86140 C-REACTIVE PROTEIN: CPT

## 2020-09-23 PROCEDURE — 2580000003 HC RX 258: Performed by: NURSE PRACTITIONER

## 2020-09-23 PROCEDURE — 31500 INSERT EMERGENCY AIRWAY: CPT | Performed by: NURSE PRACTITIONER

## 2020-09-23 PROCEDURE — 84145 PROCALCITONIN (PCT): CPT

## 2020-09-23 PROCEDURE — 80048 BASIC METABOLIC PNL TOTAL CA: CPT

## 2020-09-23 PROCEDURE — 36415 COLL VENOUS BLD VENIPUNCTURE: CPT

## 2020-09-23 PROCEDURE — 6370000000 HC RX 637 (ALT 250 FOR IP): Performed by: NURSE PRACTITIONER

## 2020-09-23 PROCEDURE — 87486 CHLMYD PNEUM DNA AMP PROBE: CPT

## 2020-09-23 PROCEDURE — 85379 FIBRIN DEGRADATION QUANT: CPT

## 2020-09-23 PROCEDURE — 87804 INFLUENZA ASSAY W/OPTIC: CPT

## 2020-09-23 PROCEDURE — 93010 ELECTROCARDIOGRAM REPORT: CPT | Performed by: INTERNAL MEDICINE

## 2020-09-23 PROCEDURE — 87070 CULTURE OTHR SPECIMN AEROBIC: CPT

## 2020-09-23 PROCEDURE — 85025 COMPLETE CBC W/AUTO DIFF WBC: CPT

## 2020-09-23 PROCEDURE — 82010 KETONE BODYS QUAN: CPT

## 2020-09-23 PROCEDURE — 82803 BLOOD GASES ANY COMBINATION: CPT

## 2020-09-23 PROCEDURE — 71045 X-RAY EXAM CHEST 1 VIEW: CPT

## 2020-09-23 PROCEDURE — 31500 INSERT EMERGENCY AIRWAY: CPT

## 2020-09-23 PROCEDURE — 6370000000 HC RX 637 (ALT 250 FOR IP): Performed by: HOSPITALIST

## 2020-09-23 PROCEDURE — 6360000002 HC RX W HCPCS: Performed by: HOSPITALIST

## 2020-09-23 PROCEDURE — 51798 US URINE CAPACITY MEASURE: CPT

## 2020-09-23 PROCEDURE — 99223 1ST HOSP IP/OBS HIGH 75: CPT | Performed by: INTERNAL MEDICINE

## 2020-09-23 PROCEDURE — 99223 1ST HOSP IP/OBS HIGH 75: CPT | Performed by: HOSPITALIST

## 2020-09-23 PROCEDURE — 85730 THROMBOPLASTIN TIME PARTIAL: CPT

## 2020-09-23 PROCEDURE — 83880 ASSAY OF NATRIURETIC PEPTIDE: CPT

## 2020-09-23 PROCEDURE — 2100000000 HC CCU R&B

## 2020-09-23 PROCEDURE — 0BH18EZ INSERTION OF ENDOTRACHEAL AIRWAY INTO TRACHEA, VIA NATURAL OR ARTIFICIAL OPENING ENDOSCOPIC: ICD-10-PCS | Performed by: INTERNAL MEDICINE

## 2020-09-23 PROCEDURE — 2500000003 HC RX 250 WO HCPCS: Performed by: NURSE PRACTITIONER

## 2020-09-23 PROCEDURE — 87631 RESP VIRUS 3-5 TARGETS: CPT

## 2020-09-23 PROCEDURE — 87147 CULTURE TYPE IMMUNOLOGIC: CPT

## 2020-09-23 PROCEDURE — 82570 ASSAY OF URINE CREATININE: CPT

## 2020-09-23 PROCEDURE — 87186 SC STD MICRODIL/AGAR DIL: CPT

## 2020-09-23 PROCEDURE — 87077 CULTURE AEROBIC IDENTIFY: CPT

## 2020-09-23 PROCEDURE — 84300 ASSAY OF URINE SODIUM: CPT

## 2020-09-23 PROCEDURE — 94660 CPAP INITIATION&MGMT: CPT

## 2020-09-23 PROCEDURE — 87040 BLOOD CULTURE FOR BACTERIA: CPT

## 2020-09-23 PROCEDURE — 83735 ASSAY OF MAGNESIUM: CPT

## 2020-09-23 PROCEDURE — 85610 PROTHROMBIN TIME: CPT

## 2020-09-23 PROCEDURE — 84484 ASSAY OF TROPONIN QUANT: CPT

## 2020-09-23 PROCEDURE — 81001 URINALYSIS AUTO W/SCOPE: CPT

## 2020-09-23 RX ORDER — CHLORHEXIDINE GLUCONATE 0.12 MG/ML
15 RINSE ORAL 2 TIMES DAILY
Status: DISCONTINUED | OUTPATIENT
Start: 2020-09-23 | End: 2020-10-02

## 2020-09-23 RX ORDER — PROPOFOL 10 MG/ML
INJECTION, EMULSION INTRAVENOUS CONTINUOUS PRN
Status: COMPLETED | OUTPATIENT
Start: 2020-09-23 | End: 2020-09-23

## 2020-09-23 RX ORDER — FENTANYL CITRATE 50 UG/ML
INJECTION, SOLUTION INTRAMUSCULAR; INTRAVENOUS
Status: DISCONTINUED
Start: 2020-09-23 | End: 2020-09-23 | Stop reason: WASHOUT

## 2020-09-23 RX ORDER — ONDANSETRON 2 MG/ML
4 INJECTION INTRAMUSCULAR; INTRAVENOUS EVERY 6 HOURS PRN
Status: DISCONTINUED | OUTPATIENT
Start: 2020-09-23 | End: 2020-11-06 | Stop reason: HOSPADM

## 2020-09-23 RX ORDER — 0.9 % SODIUM CHLORIDE 0.9 %
1000 INTRAVENOUS SOLUTION INTRAVENOUS ONCE
Status: COMPLETED | OUTPATIENT
Start: 2020-09-23 | End: 2020-09-23

## 2020-09-23 RX ORDER — ALLOPURINOL 100 MG/1
200 TABLET ORAL DAILY
Status: DISCONTINUED | OUTPATIENT
Start: 2020-09-23 | End: 2020-10-17

## 2020-09-23 RX ORDER — KETAMINE HCL IN NACL, ISO-OSM 100MG/10ML
100 SYRINGE (ML) INJECTION ONCE
Status: COMPLETED | OUTPATIENT
Start: 2020-09-23 | End: 2020-09-23

## 2020-09-23 RX ORDER — FENTANYL CITRATE 50 UG/ML
25 INJECTION, SOLUTION INTRAMUSCULAR; INTRAVENOUS
Status: DISCONTINUED | OUTPATIENT
Start: 2020-09-23 | End: 2020-10-02

## 2020-09-23 RX ORDER — NICOTINE POLACRILEX 4 MG
15 LOZENGE BUCCAL PRN
Status: DISCONTINUED | OUTPATIENT
Start: 2020-09-23 | End: 2020-11-04

## 2020-09-23 RX ORDER — SODIUM CHLORIDE 0.9 % (FLUSH) 0.9 %
10 SYRINGE (ML) INJECTION EVERY 12 HOURS SCHEDULED
Status: DISCONTINUED | OUTPATIENT
Start: 2020-09-23 | End: 2020-10-12 | Stop reason: SDUPTHER

## 2020-09-23 RX ORDER — PANTOPRAZOLE SODIUM 40 MG/1
40 TABLET, DELAYED RELEASE ORAL
Status: DISCONTINUED | OUTPATIENT
Start: 2020-09-24 | End: 2020-09-23

## 2020-09-23 RX ORDER — VITAMIN B COMPLEX
2000 TABLET ORAL DAILY
Status: DISCONTINUED | OUTPATIENT
Start: 2020-09-23 | End: 2020-10-17

## 2020-09-23 RX ORDER — SODIUM CHLORIDE 9 MG/ML
INJECTION, SOLUTION INTRAVENOUS CONTINUOUS
Status: DISCONTINUED | OUTPATIENT
Start: 2020-09-23 | End: 2020-09-23 | Stop reason: SDUPTHER

## 2020-09-23 RX ORDER — BUDESONIDE AND FORMOTEROL FUMARATE DIHYDRATE 160; 4.5 UG/1; UG/1
2 AEROSOL RESPIRATORY (INHALATION) 2 TIMES DAILY
Status: DISCONTINUED | OUTPATIENT
Start: 2020-09-23 | End: 2020-09-23

## 2020-09-23 RX ORDER — KETAMINE HCL IN NACL, ISO-OSM 100MG/10ML
SYRINGE (ML) INJECTION
Status: DISCONTINUED
Start: 2020-09-23 | End: 2020-09-24

## 2020-09-23 RX ORDER — CITALOPRAM 20 MG/1
30 TABLET ORAL DAILY
Status: DISCONTINUED | OUTPATIENT
Start: 2020-09-23 | End: 2020-10-05

## 2020-09-23 RX ORDER — ASCORBIC ACID 500 MG
1000 TABLET ORAL DAILY
Status: DISCONTINUED | OUTPATIENT
Start: 2020-09-23 | End: 2020-09-23

## 2020-09-23 RX ORDER — SENNA PLUS 8.6 MG/1
1 TABLET ORAL 2 TIMES DAILY
Status: DISCONTINUED | OUTPATIENT
Start: 2020-09-24 | End: 2020-10-14

## 2020-09-23 RX ORDER — POLYETHYLENE GLYCOL 3350 17 G/17G
17 POWDER, FOR SOLUTION ORAL EVERY OTHER DAY
Status: DISCONTINUED | OUTPATIENT
Start: 2020-09-24 | End: 2020-09-24 | Stop reason: ALTCHOICE

## 2020-09-23 RX ORDER — ALBUTEROL SULFATE 2.5 MG/3ML
5 SOLUTION RESPIRATORY (INHALATION) EVERY 4 HOURS PRN
Status: DISCONTINUED | OUTPATIENT
Start: 2020-09-23 | End: 2020-11-06 | Stop reason: HOSPADM

## 2020-09-23 RX ORDER — DILTIAZEM HYDROCHLORIDE 60 MG/1
60 TABLET, FILM COATED ORAL 2 TIMES DAILY
Status: DISCONTINUED | OUTPATIENT
Start: 2020-09-23 | End: 2020-10-02

## 2020-09-23 RX ORDER — ASPIRIN 81 MG/1
81 TABLET, CHEWABLE ORAL DAILY
Status: DISCONTINUED | OUTPATIENT
Start: 2020-09-23 | End: 2020-11-06 | Stop reason: HOSPADM

## 2020-09-23 RX ORDER — PROPOFOL 10 MG/ML
INJECTION, EMULSION INTRAVENOUS
Status: COMPLETED
Start: 2020-09-23 | End: 2020-09-23

## 2020-09-23 RX ORDER — PROPOFOL 10 MG/ML
10 INJECTION, EMULSION INTRAVENOUS
Status: DISCONTINUED | OUTPATIENT
Start: 2020-09-23 | End: 2020-10-02

## 2020-09-23 RX ORDER — LIDOCAINE 50 MG/G
OINTMENT TOPICAL PRN
Status: DISCONTINUED | OUTPATIENT
Start: 2020-09-23 | End: 2020-11-06 | Stop reason: HOSPADM

## 2020-09-23 RX ORDER — CISATRACURIUM BESYLATE 2 MG/ML
20 INJECTION, SOLUTION INTRAVENOUS ONCE
Status: COMPLETED | OUTPATIENT
Start: 2020-09-23 | End: 2020-09-23

## 2020-09-23 RX ORDER — ACETAMINOPHEN 650 MG/1
650 SUPPOSITORY RECTAL EVERY 6 HOURS PRN
Status: DISCONTINUED | OUTPATIENT
Start: 2020-09-23 | End: 2020-09-29

## 2020-09-23 RX ORDER — HYDRALAZINE HYDROCHLORIDE 50 MG/1
50 TABLET, FILM COATED ORAL 2 TIMES DAILY
Status: DISCONTINUED | OUTPATIENT
Start: 2020-09-23 | End: 2020-10-05

## 2020-09-23 RX ORDER — DEXAMETHASONE SODIUM PHOSPHATE 4 MG/ML
4 INJECTION, SOLUTION INTRA-ARTICULAR; INTRALESIONAL; INTRAMUSCULAR; INTRAVENOUS; SOFT TISSUE EVERY 6 HOURS
Status: DISCONTINUED | OUTPATIENT
Start: 2020-09-23 | End: 2020-09-23

## 2020-09-23 RX ORDER — ESOMEPRAZOLE MAGNESIUM 20 MG/1
20 FOR SUSPENSION ORAL DAILY
Status: DISCONTINUED | OUTPATIENT
Start: 2020-09-23 | End: 2020-09-23

## 2020-09-23 RX ORDER — BUSPIRONE HYDROCHLORIDE 10 MG/1
10 TABLET ORAL 2 TIMES DAILY
Status: DISCONTINUED | OUTPATIENT
Start: 2020-09-23 | End: 2020-10-05

## 2020-09-23 RX ORDER — PROMETHAZINE HYDROCHLORIDE 25 MG/1
12.5 TABLET ORAL EVERY 6 HOURS PRN
Status: DISCONTINUED | OUTPATIENT
Start: 2020-09-23 | End: 2020-11-06 | Stop reason: HOSPADM

## 2020-09-23 RX ORDER — GUAIFENESIN 100 MG/5ML
200 SOLUTION ORAL EVERY 4 HOURS PRN
Status: DISCONTINUED | OUTPATIENT
Start: 2020-09-23 | End: 2020-09-23

## 2020-09-23 RX ORDER — ARIPIPRAZOLE 15 MG/1
15 TABLET ORAL DAILY
Status: DISCONTINUED | OUTPATIENT
Start: 2020-09-23 | End: 2020-11-06 | Stop reason: HOSPADM

## 2020-09-23 RX ORDER — TAMSULOSIN HYDROCHLORIDE 0.4 MG/1
0.4 CAPSULE ORAL NIGHTLY
Status: DISCONTINUED | OUTPATIENT
Start: 2020-09-23 | End: 2020-10-06

## 2020-09-23 RX ORDER — FENTANYL CITRATE 50 UG/ML
100 INJECTION, SOLUTION INTRAMUSCULAR; INTRAVENOUS ONCE
Status: COMPLETED | OUTPATIENT
Start: 2020-09-23 | End: 2020-09-23

## 2020-09-23 RX ORDER — DEXTROSE MONOHYDRATE 25 G/50ML
12.5 INJECTION, SOLUTION INTRAVENOUS PRN
Status: DISCONTINUED | OUTPATIENT
Start: 2020-09-23 | End: 2020-11-04

## 2020-09-23 RX ORDER — DEXTROSE MONOHYDRATE 50 MG/ML
100 INJECTION, SOLUTION INTRAVENOUS PRN
Status: DISCONTINUED | OUTPATIENT
Start: 2020-09-23 | End: 2020-11-04

## 2020-09-23 RX ORDER — FOLIC ACID 1 MG/1
1 TABLET ORAL DAILY
Status: DISCONTINUED | OUTPATIENT
Start: 2020-09-23 | End: 2020-10-17

## 2020-09-23 RX ORDER — FUROSEMIDE 40 MG/1
40 TABLET ORAL DAILY
Status: DISCONTINUED | OUTPATIENT
Start: 2020-09-23 | End: 2020-10-06

## 2020-09-23 RX ORDER — ATORVASTATIN CALCIUM 40 MG/1
40 TABLET, FILM COATED ORAL NIGHTLY
Status: DISCONTINUED | OUTPATIENT
Start: 2020-09-23 | End: 2020-10-17

## 2020-09-23 RX ORDER — CISATRACURIUM BESYLATE 2 MG/ML
20 INJECTION, SOLUTION INTRAVENOUS ONCE
Status: DISCONTINUED | OUTPATIENT
Start: 2020-09-23 | End: 2020-09-23 | Stop reason: SDUPTHER

## 2020-09-23 RX ORDER — SODIUM CHLORIDE 0.9 % (FLUSH) 0.9 %
10 SYRINGE (ML) INJECTION PRN
Status: DISCONTINUED | OUTPATIENT
Start: 2020-09-23 | End: 2020-10-12 | Stop reason: SDUPTHER

## 2020-09-23 RX ORDER — INSULIN GLARGINE 100 [IU]/ML
10 INJECTION, SOLUTION SUBCUTANEOUS NIGHTLY
Status: DISCONTINUED | OUTPATIENT
Start: 2020-09-23 | End: 2020-09-24

## 2020-09-23 RX ORDER — ACETAMINOPHEN 325 MG/1
650 TABLET ORAL EVERY 6 HOURS PRN
Status: DISCONTINUED | OUTPATIENT
Start: 2020-09-23 | End: 2020-11-06 | Stop reason: HOSPADM

## 2020-09-23 RX ORDER — AMLODIPINE BESYLATE 5 MG/1
5 TABLET ORAL DAILY
Status: DISCONTINUED | OUTPATIENT
Start: 2020-09-23 | End: 2020-10-05

## 2020-09-23 RX ORDER — DEXAMETHASONE SODIUM PHOSPHATE 4 MG/ML
4 INJECTION, SOLUTION INTRA-ARTICULAR; INTRALESIONAL; INTRAMUSCULAR; INTRAVENOUS; SOFT TISSUE DAILY
Status: COMPLETED | OUTPATIENT
Start: 2020-09-24 | End: 2020-09-27

## 2020-09-23 RX ORDER — M-VIT,TX,IRON,MINS/CALC/FOLIC 27MG-0.4MG
1 TABLET ORAL DAILY
Status: DISCONTINUED | OUTPATIENT
Start: 2020-09-24 | End: 2020-09-28

## 2020-09-23 RX ORDER — SODIUM CHLORIDE 9 MG/ML
INJECTION, SOLUTION INTRAVENOUS CONTINUOUS
Status: DISCONTINUED | OUTPATIENT
Start: 2020-09-23 | End: 2020-09-30

## 2020-09-23 RX ORDER — POLYETHYLENE GLYCOL 3350 17 G/17G
17 POWDER, FOR SOLUTION ORAL DAILY PRN
Status: DISCONTINUED | OUTPATIENT
Start: 2020-09-23 | End: 2020-11-06 | Stop reason: HOSPADM

## 2020-09-23 RX ORDER — GABAPENTIN 400 MG/1
800 CAPSULE ORAL 2 TIMES DAILY
Status: DISCONTINUED | OUTPATIENT
Start: 2020-09-23 | End: 2020-10-17

## 2020-09-23 RX ADMIN — SODIUM CHLORIDE: 9 INJECTION, SOLUTION INTRAVENOUS at 23:11

## 2020-09-23 RX ADMIN — CISATRACURIUM BESYLATE 20 MG: 2 INJECTION INTRAVENOUS at 21:42

## 2020-09-23 RX ADMIN — PROPOFOL 20 MCG/KG/MIN: 10 INJECTION, EMULSION INTRAVENOUS at 21:29

## 2020-09-23 RX ADMIN — SODIUM CHLORIDE: 9 INJECTION, SOLUTION INTRAVENOUS at 16:20

## 2020-09-23 RX ADMIN — SODIUM CHLORIDE 1000 ML: 9 INJECTION, SOLUTION INTRAVENOUS at 22:17

## 2020-09-23 RX ADMIN — ENOXAPARIN SODIUM 130 MG: 150 INJECTION SUBCUTANEOUS at 18:57

## 2020-09-23 RX ADMIN — Medication 2000 UNITS: at 18:20

## 2020-09-23 RX ADMIN — DEXAMETHASONE SODIUM PHOSPHATE 4 MG: 4 INJECTION, SOLUTION INTRAMUSCULAR; INTRAVENOUS at 16:12

## 2020-09-23 RX ADMIN — FAMOTIDINE 20 MG: 10 INJECTION INTRAVENOUS at 22:39

## 2020-09-23 RX ADMIN — PROPOFOL 20 MCG/KG/MIN: 10 INJECTION, EMULSION INTRAVENOUS at 21:30

## 2020-09-23 RX ADMIN — ARIPIPRAZOLE 15 MG: 15 TABLET ORAL at 18:21

## 2020-09-23 RX ADMIN — SODIUM CHLORIDE 1000 ML: 9 INJECTION, SOLUTION INTRAVENOUS at 21:25

## 2020-09-23 RX ADMIN — Medication 100 MG: at 21:30

## 2020-09-23 RX ADMIN — FENTANYL CITRATE 100 MCG: 50 INJECTION, SOLUTION INTRAMUSCULAR; INTRAVENOUS at 21:28

## 2020-09-23 RX ADMIN — SODIUM CHLORIDE: 9 INJECTION, SOLUTION INTRAVENOUS at 11:22

## 2020-09-23 RX ADMIN — AZITHROMYCIN 500 MG: 500 INJECTION, POWDER, LYOPHILIZED, FOR SOLUTION INTRAVENOUS at 18:31

## 2020-09-23 RX ADMIN — AMLODIPINE BESYLATE 5 MG: 5 TABLET ORAL at 18:21

## 2020-09-23 RX ADMIN — ALLOPURINOL 200 MG: 100 TABLET ORAL at 18:21

## 2020-09-23 RX ADMIN — CITALOPRAM 30 MG: 20 TABLET, FILM COATED ORAL at 18:21

## 2020-09-23 RX ADMIN — DILTIAZEM HYDROCHLORIDE 60 MG: 60 TABLET, FILM COATED ORAL at 18:21

## 2020-09-23 RX ADMIN — FOLIC ACID 1 MG: 1 TABLET ORAL at 18:21

## 2020-09-23 RX ADMIN — GLYCOPYRROLATE AND FORMOTEROL FUMARATE 2 PUFF: 9; 4.8 AEROSOL, METERED RESPIRATORY (INHALATION) at 21:58

## 2020-09-23 RX ADMIN — Medication 15 ML: at 22:39

## 2020-09-23 RX ADMIN — DEXAMETHASONE SODIUM PHOSPHATE 4 MG: 4 INJECTION, SOLUTION INTRAMUSCULAR; INTRAVENOUS at 22:39

## 2020-09-23 RX ADMIN — CEFTRIAXONE SODIUM 1 G: 1 INJECTION, POWDER, FOR SOLUTION INTRAMUSCULAR; INTRAVENOUS at 16:19

## 2020-09-23 ASSESSMENT — PAIN DESCRIPTION - PROGRESSION
CLINICAL_PROGRESSION: NOT CHANGED

## 2020-09-23 ASSESSMENT — PAIN DESCRIPTION - DESCRIPTORS
DESCRIPTORS: ACHING
DESCRIPTORS: STABBING
DESCRIPTORS: STABBING
DESCRIPTORS: SHARP;STABBING

## 2020-09-23 ASSESSMENT — PAIN SCALES - GENERAL
PAINLEVEL_OUTOF10: 0
PAINLEVEL_OUTOF10: 7

## 2020-09-23 ASSESSMENT — PAIN DESCRIPTION - FREQUENCY
FREQUENCY: INTERMITTENT
FREQUENCY: CONTINUOUS
FREQUENCY: INTERMITTENT

## 2020-09-23 ASSESSMENT — PAIN DESCRIPTION - PAIN TYPE
TYPE: ACUTE PAIN

## 2020-09-23 ASSESSMENT — PAIN DESCRIPTION - LOCATION
LOCATION: FOOT
LOCATION: FOOT
LOCATION: ABDOMEN
LOCATION: LEG

## 2020-09-23 ASSESSMENT — PAIN DESCRIPTION - ONSET
ONSET: ON-GOING
ONSET: ON-GOING

## 2020-09-23 ASSESSMENT — PAIN DESCRIPTION - ORIENTATION
ORIENTATION: RIGHT;LEFT
ORIENTATION: MID;LEFT
ORIENTATION: RIGHT;LEFT
ORIENTATION: LEFT;RIGHT

## 2020-09-23 ASSESSMENT — PULMONARY FUNCTION TESTS: PIF_VALUE: 35

## 2020-09-23 ASSESSMENT — ENCOUNTER SYMPTOMS
ABDOMINAL PAIN: 0
SHORTNESS OF BREATH: 1
COUGH: 1

## 2020-09-23 ASSESSMENT — PAIN - FUNCTIONAL ASSESSMENT
PAIN_FUNCTIONAL_ASSESSMENT: PREVENTS OR INTERFERES WITH MANY ACTIVE NOT PASSIVE ACTIVITIES
PAIN_FUNCTIONAL_ASSESSMENT: PREVENTS OR INTERFERES WITH MANY ACTIVE NOT PASSIVE ACTIVITIES

## 2020-09-23 NOTE — ED PROVIDER NOTES
[facility]  eMERGENCY dEPARTMENT eNCOUnter          CHIEF COMPLAINT       Chief Complaint   Patient presents with    Fatigue       Nurses Notes reviewed and I agree except as noted in the HPI. HISTORY OF PRESENT ILLNESS    Joel Louie is a 44 y.o. male who presents shortness of breath    Location/Symptom: Shortness of breath  Timing/Onset: Worse over the last 3 days  Context/Setting: Discharge 9/15/2020 from [place] [address] for COVID pneumonia  He has chronic CO2 retention and wears home oxygen at 5 to 6 L  Morbid obesity  Quality: Increasing shortness of breath the last 2 to 3 days  Weaker hard to get around  Yesterday was down to the floor when a friend checked on him and he was unable to get up but with assistance was able to get into bed  By today just more short of breath and not able to ambulate  Duration: Worse the last 3 days  Modifying Factors: Oxygen at home  Severity: 8/10    REVIEW OF SYSTEMS     Review of Systems   Constitutional: Positive for fatigue. Negative for chills, diaphoresis and fever. HENT: Positive for congestion. Eyes: Negative for visual disturbance. Respiratory: Positive for cough and shortness of breath. Cardiovascular: Negative for chest pain. Gastrointestinal: Negative for abdominal pain. Musculoskeletal: Negative for joint swelling. Skin: Negative for wound. Neurological: Negative for speech difficulty. Hematological: Negative for adenopathy. Psychiatric/Behavioral: Negative for confusion.           PAST MEDICAL HISTORY    has a past medical history of Arthritis, Atrial fibrillation ([address] BPH (benign prostatic hyperplasia), CAD (coronary artery disease), Carpal tunnel syndrome on right, Chronic gout, DM2 (diabetes mellitus, type 2) ([address] GERD (gastroesophageal reflux disease), Heart failure with preserved ejection fraction ([address] History of alcohol abuse, History of osteomyelitis, Hyperlipidemia, Hypertension, essential, Hypogonadism, male, Major depression, Mood disorder ([facility] [address] Morbid obesity ([facility] [address] Muscle weakness, DURAN (nonalcoholic steatohepatitis), Obstructive sleep apnea treated with continuous positive airway pressure (CPAP), KIARA (obstructive sleep apnea), and Vitamin D deficiency. SURGICAL HISTORY      has a past surgical history that includes tracheostomy; Foot surgery (Right); and hip surgery (Left, 6/29/2020). CURRENT MEDICATIONS       Current Discharge Medication List      CONTINUE these medications which have NOT CHANGED    Details   vitamin C (ASCORBIC ACID) 500 MG tablet Take 2 tablets by mouth daily  Qty: 30 tablet, Refills: 0      enoxaparin (LOVENOX) 120 MG/0.8ML injection Inject 0.87 mLs into the skin every 12 hours  Qty: 30 Syringe, Refills: 0      aspirin 81 MG chewable tablet Take 1 tablet by mouth daily  Qty: 30 tablet, Refills: 0      CHOLECALCIFEROL PO Take 2,000 Units by mouth daily      atorvastatin (LIPITOR) 40 MG tablet Take 40 mg by mouth nightly      acetaminophen (TYLENOL) 325 MG tablet Take 650 mg by mouth every 4 hours as needed for Pain      benzocaine (BABY ORAJEL) 7.5 % oral gel Take by mouth 4 times daily as needed for Pain (mouth pain) Take by mouth 3 times daily. guaiFENesin (ROBITUSSIN) 100 MG/5ML syrup Take 200 mg by mouth every 4 hours as needed for Cough      oxyCODONE-acetaminophen (PERCOCET) 5-325 MG per tablet Take 1 tablet by mouth every 4 hours as needed for Pain. busPIRone (BUSPAR) 10 MG tablet Take 10 mg by mouth 2 times daily       Trolamine Salicylate (ASPERCREME) 10 % LOTN Apply topically as needed for Pain  Qty: 1 Bottle, Refills: 0      pantoprazole (PROTONIX) 40 MG tablet Take 1 tablet by mouth daily  Qty: 5 tablet, Refills: 0      !! allopurinol (ZYLOPRIM) 300 MG tablet Take 1 tablet by mouth daily  Qty: 90 tablet, Refills: 0    Associated Diagnoses: H/O: gout;  Medication monitoring encounter; Chronic tophaceous gout      !! allopurinol (ZYLOPRIM) 100 MG tablet Take 2 tablets by mouth daily  Qty: 60 tablet, Refills: 0    Associated Diagnoses: Chronic tophaceous gout; H/O: gout      lidocaine (XYLOCAINE) 5 % ointment Apply topically as needed to painful areas on lower back, hips, knees, and feet  Qty: 1 Tube, Refills: 2      esomeprazole Magnesium (NEXIUM) 20 MG PACK Take 20 mg by mouth daily      hydrALAZINE (APRESOLINE) 50 MG tablet Take 50 mg by mouth 2 times daily       Wound Dressings (MAXORB EX) Apply topically      polyethylene glycol (GLYCOLAX) powder Take 17 g by mouth every other day       Multiple Vitamins-Minerals (THERAPEUTIC MULTIVITAMIN-MINERALS) tablet Take 1 tablet by mouth daily      Diaper Rash Products (PINXAV) OINT Apply topically      Probiotic Product (SOBIA-BID PROBIOTIC PO) Take 1 tablet by mouth daily       ondansetron (ZOFRAN) 4 MG tablet Take 4 mg by mouth every 8 hours as needed for Nausea or Vomiting      diltiazem (CARDIZEM) 60 MG tablet Take 60 mg by mouth 2 times daily      tamsulosin (FLOMAX) 0.4 MG capsule Take 0.4 mg by mouth nightly       folic acid (FOLVITE) 1 MG tablet Take 1 mg by mouth daily      furosemide (LASIX) 40 MG tablet Take 40 mg by mouth daily      gabapentin (NEURONTIN) 400 MG capsule Take 800 mg by mouth 2 times daily. insulin lispro (HUMALOG) 100 UNIT/ML injection vial Inject into the skin 3 times daily (before meals) Per scale: 151-200=1 unit, 201-250=2 units, 251-300-3 units, 301-350=4 units, 351-400=5 units. sitaGLIPtan-metformin (JANUMET)  MG per tablet Take 1 tablet by mouth 2 times daily (with meals)      insulin glargine (LANTUS) 100 UNIT/ML injection vial Inject 10 Units into the skin nightly       senna (SENOKOT) 8.6 MG tablet Take 1 tablet by mouth 2 times daily      diazepam (VALIUM) 5 MG tablet Take 5 mg by mouth every 6 hours as needed for Anxiety.       ARIPiprazole (ABILIFY PO) Take 15 mg by mouth daily     Associated Diagnoses: DDD (degenerative disc disease)      amlodipine (NORVASC) 10 MG tablet Take 5 mg by mouth daily       Citalopram Hydrobromide (CELEXA PO) Take 30 mg by mouth daily From [facility] [employer] services       Blood Glucose Monitoring Suppl (FREESTYLE LITE) ARTIE Patient needs all supplies for qd testing. DX: 250.00  Qty: 1 Device, Refills: 11       !! - Potential duplicate medications found. Please discuss with provider. ALLERGIES     is allergic to pcn [penicillins]. FAMILY HISTORY     He indicated that his mother is . He indicated that his father is alive. He indicated that his brother is alive. He indicated that his daughter is alive. He indicated that both of his sons are alive. He indicated that the status of his paternal aunt is unknown.   family history includes Cancer in his paternal aunt; Heart Disease in his mother. SOCIAL HISTORY      reports that he has never smoked. He has never used smokeless tobacco. He reports previous alcohol use. He reports that he does not use drugs. PHYSICAL EXAM     INITIAL VITALS:  height is 5' 8\" (1.727 m) and weight is 285 lb (129.3 kg). His oral temperature is 99.8 °F (37.7 °C). His blood pressure is 117/73 and his pulse is 92. His respiration is 20 and oxygen saturation is 97%. Physical Exam  Vitals signs and nursing note reviewed. Constitutional:       Comments: GCS 15    Morbidly obese   HENT:      Head: Normocephalic and atraumatic. Eyes:      Extraocular Movements: Extraocular movements intact. Pupils: Pupils are equal, round, and reactive to light. Cardiovascular:      Rate and Rhythm: Normal rate and regular rhythm. Pulses: Normal pulses. Heart sounds: Normal heart sounds. Pulmonary:      Comments: Diminished breath sounds throughout  Abdominal:      Palpations: Abdomen is soft. Skin:     General: Skin is warm and dry. Neurological:      General: No focal deficit present. Mental Status: He is alert.    Psychiatric:      Comments: Alert cooperative           DIFFERENTIAL DIAGNOSIS:     COVID-19 pneumonia now worsening dyspnea and fatigue weakness    Chronic CO2 retainer morbid obesity    DIAGNOSTIC RESULTS     EKG: All EKG's are interpreted by the Emergency Department Physician who either signs or Co-signs this chart in the absence of a cardiologist.  EKG showed sinus rhythm with rate 86. QRS complexes show -19 degree axis, normal conduction.   ST-T waves show nonspecific changes        RADIOLOGY: non-plain film images(s) such as CT, Ultrasound and MRI are read by the radiologist.  The patient had a single view X-ray of the chest which demonstrates bilateral infiltrates compared to prior chest x-ray, looks worse    [x] Visualized and interpreted by me   [x] Radiologist's Wet Read Report Reviewed   [] Discussed with Radiologist.    LABS:   Labs Reviewed   PROTIME-INR - Abnormal; Notable for the following components:       Result Value    INR 1.17 (*)     All other components within normal limits   CBC WITH AUTO DIFFERENTIAL - Abnormal; Notable for the following components:    WBC 11.3 (*)     RBC 3.89 (*)     Hemoglobin 11.3 (*)     Hematocrit 36.8 (*)     MCV 94.6 (*)     MCHC 30.7 (*)     RDW-CV 18.6 (*)     RDW-SD 64.0 (*)     Segs Absolute 8.5 (*)     Monocytes Absolute 1.5 (*)     All other components within normal limits   C-REACTIVE PROTEIN - Abnormal; Notable for the following components:    CRP 25.10 (*)     All other components within normal limits   BASIC METABOLIC PANEL - Abnormal; Notable for the following components:    Sodium 146 (*)     Glucose 395 (*)     CREATININE 1.4 (*)     All other components within normal limits   TROPONIN - Abnormal; Notable for the following components:    Troponin T 0.028 (*)     All other components within normal limits   BLOOD GAS, ARTERIAL - Abnormal; Notable for the following components:    pH, Blood Gas 7.33 (*)     PCO2 59 (*)     PO2 66 (*)     HCO3 31 (*)     Base Excess (Calculated) 3.4 (*)     All other components within normal limits   LACTATE DEHYDROGENASE - Abnormal; Notable for the following components:     (*)     All other components within normal limits   GLOMERULAR FILTRATION RATE, ESTIMATED - Abnormal; Notable for the following components:    Est, Glom Filt Rate 64 (*)     All other components within normal limits   OSMOLALITY - Abnormal; Notable for the following components:    Osmolality Calc 308.9 (*)     All other components within normal limits   PROCALCITONIN - Abnormal; Notable for the following components:    Procalcitonin 0.64 (*)     All other components within normal limits   D-DIMER, QUANTITATIVE - Abnormal; Notable for the following components:    D-Dimer, Quant 1540.00 (*)     All other components within normal limits   APTT   ANION GAP   BRAIN NATRIURETIC PEPTIDE   URINALYSIS WITH MICROSCOPIC   SODIUM, URINE, RANDOM   OSMOLALITY, URINE   CREATININE, RANDOM URINE       EMERGENCY DEPARTMENT COURSE:   Vitals:    Vitals:    09/23/20 1125 09/23/20 1224 09/23/20 1343 09/23/20 1347   BP: (!) 157/90 (!) 157/83 117/73    Pulse: 86 88 92    Resp:   20    Temp:   99.8 °F (37.7 °C)    TempSrc:   Oral    SpO2: 98% 99% 99% 97%   Weight:       Height:         Nursing notes reviewed    Patient's oxygenation is borderline    Awake talking to the patient his O2 sats are 93% on 5 to 6 L    When he starts to doze off his O2 sats will drop into the 80s    has been placed on a nonrebreather facemask    Blood gas showed pH 7.33, CO2 59, O2 66 showing chronic CO2 retention with slight worsening. Oxygenation 66 on 5 L.     Compared to prior blood gases this is still somewhat better than previous from August 2020    WBC 11,300    CRP 25    Blood sugar 395    Troponin 0.028 slightly elevated    Procalcitonin 0.64    Kept with O2 by facemask    I did talk to ICU attending Nan Simmons, and after discussion he felt could be admitted to hospitalist service initially and did not need initially ICU admission    Case was given to Dr. Alysa Singh for admission      CRITICAL CARE:   none    CONSULTS:  Dr. Ishan Lara Gears[place]ha:  None    FINAL IMPRESSION      1. COVID-19 virus infection    2. Pneumonia due to 2019-nCoV          DISPOSITION/PLAN   Admit      PATIENT REFERRED TO:  No follow-up provider specified.     DISCHARGE MEDICATIONS:  Current Discharge Medication List          (Please note that portions of this note were completed with a voice recognition program.  Efforts were made to edit the dictations but occasionally words are mis-transcribed.)    MD Wes Connell MD  09/23/20 3861

## 2020-09-23 NOTE — ED NOTES
Bed: 015A  Expected date:   Expected time:   [facility]  Comments:     James Zamarripa RN  09/23/20 3360

## 2020-09-23 NOTE — H&P
HISTORY AND PHYSICAL             Date: 9/23/2020        Patient Name: Mark Meraz     YOB: 1970      Age:  44 y.o. Chief Complaint     Chief Complaint   Patient presents with    Fatigue          History Obtained From   patient    History of Present Illness   Last well 2-days ago, started feeling progressively worsening SOB. Denies CP/fever/chills/diarrhea/urinary symptpms. +ve leg swelling. Unsure if he has been taking all his meds since discharge from hospital    Past Medical History     Past Medical History:   Diagnosis Date    Arthritis     RHEUMATOID    Atrial fibrillation (HCC)     BPH (benign prostatic hyperplasia)     CAD (coronary artery disease)     Carpal tunnel syndrome on right     rt hand    Chronic gout     DM2 (diabetes mellitus, type 2) (HCC)     GERD (gastroesophageal reflux disease)     Heart failure with preserved ejection fraction (HCC)     History of alcohol abuse     History of osteomyelitis     Hx of R foot wound, osteomyelitis July 2018 requiring surgery and IV Vanco    Hyperlipidemia     Hypertension, essential     Hypogonadism, male     Major depression     Mood disorder ([address]     Morbid obesity ([address]     Muscle weakness     DURAN (nonalcoholic steatohepatitis)     Obstructive sleep apnea treated with continuous positive airway pressure (CPAP)     KIARA (obstructive sleep apnea)     Vitamin D deficiency         Past Surgical History     Past Surgical History:   Procedure Laterality Date    FOOT SURGERY Right     2018, R foot osteomyelitis    HIP SURGERY Left 6/29/2020    bilateral hip steroid injection, Left HIP FIRST performed [facility]dev [facility]      as a baby        Medications Prior to Admission     Prior to Admission medications    Medication Sig Start Date End Date Taking?  Authorizing Provider   vitamin C (ASCORBIC ACID) 500 MG tablet Take 2 tablets by mouth daily 9/15/20   Rina Hussein SANDRA Paz - CNP   enoxaparin (LOVENOX) 120 MG/0.8ML injection Inject 0.87 mLs into the skin every 12 hours 9/15/20   SANDRA Maddox CNP   aspirin 81 MG chewable tablet Take 1 tablet by mouth daily 9/16/20   SANDRA Maddox CNP   CHOLECALCIFEROL PO Take 2,000 Units by mouth daily    Historical Provider, MD   atorvastatin (LIPITOR) 40 MG tablet Take 40 mg by mouth nightly    Historical Provider, MD   acetaminophen (TYLENOL) 325 MG tablet Take 650 mg by mouth every 4 hours as needed for Pain    Historical Provider, MD   benzocaine (BABY ORAJEL) 7.5 % oral gel Take by mouth 4 times daily as needed for Pain (mouth pain) Take by mouth 3 times daily. Historical Provider, MD   guaiFENesin (ROBITUSSIN) 100 MG/5ML syrup Take 200 mg by mouth every 4 hours as needed for Cough    Historical Provider, MD   oxyCODONE-acetaminophen (PERCOCET) 5-325 MG per tablet Take 1 tablet by mouth every 4 hours as needed for Pain.      Historical Provider, MD   busPIRone (BUSPAR) 10 MG tablet Take 10 mg by mouth 2 times daily     Historical Provider, MD   Trolamine Salicylate (ASPERCREME) 10 % LOTN Apply topically as needed for Pain 5/22/20   Jennifer Mccollum MD   pantoprazole (PROTONIX) 40 MG tablet Take 1 tablet by mouth daily 5/22/20   Jennifer Mccollum MD   allopurinol (ZYLOPRIM) 300 MG tablet Take 1 tablet by mouth daily  Patient taking differently: Take 500 mg by mouth daily  4/9/20   Howard Muir DO   allopurinol (ZYLOPRIM) 100 MG tablet Take 2 tablets by mouth daily 4/9/20   Tori Escobedo DO   lidocaine (XYLOCAINE) 5 % ointment Apply topically as needed to painful areas on lower back, hips, knees, and feet 3/11/20   SANDRA Ahuja CNP   esomeprazole Magnesium (NEXIUM) 20 MG PACK Take 20 mg by mouth daily    Historical Provider, MD   hydrALAZINE (APRESOLINE) 50 MG tablet Take 50 mg by mouth 2 times daily     Historical Provider, MD   Wound Dressings (MAXORB EX) Apply topically    Historical Provider, MD polyethylene glycol (GLYCOLAX) powder Take 17 g by mouth every other day     Historical Provider, MD   Multiple Vitamins-Minerals (THERAPEUTIC MULTIVITAMIN-MINERALS) tablet Take 1 tablet by mouth daily    Historical Provider, MD   Diaper Rash Products (PINXAV) OINT Apply topically    Historical Provider, MD   Probiotic Product (SOBIA-BID PROBIOTIC PO) Take 1 tablet by mouth daily     Historical Provider, MD   ondansetron (ZOFRAN) 4 MG tablet Take 4 mg by mouth every 8 hours as needed for Nausea or Vomiting    Historical Provider, MD   diltiazem (CARDIZEM) 60 MG tablet Take 60 mg by mouth 2 times daily    Historical Provider, MD   tamsulosin (FLOMAX) 0.4 MG capsule Take 0.4 mg by mouth nightly     Historical Provider, MD   folic acid (FOLVITE) 1 MG tablet Take 1 mg by mouth daily    Historical Provider, MD   furosemide (LASIX) 40 MG tablet Take 40 mg by mouth daily    Historical Provider, MD   gabapentin (NEURONTIN) 400 MG capsule Take 800 mg by mouth 2 times daily. Historical Provider, MD   insulin lispro (HUMALOG) 100 UNIT/ML injection vial Inject into the skin 3 times daily (before meals) Per scale: 151-200=1 unit, 201-250=2 units, 251-300-3 units, 301-350=4 units, 351-400=5 units. Historical Provider, MD   sitaGLIPtan-metformin (JANUMET)  MG per tablet Take 1 tablet by mouth 2 times daily (with meals)    Historical Provider, MD   insulin glargine (LANTUS) 100 UNIT/ML injection vial Inject 10 Units into the skin nightly     Historical Provider, MD   senna (SENOKOT) 8.6 MG tablet Take 1 tablet by mouth 2 times daily    Historical Provider, MD   diazepam (VALIUM) 5 MG tablet Take 5 mg by mouth every 6 hours as needed for Anxiety.     Historical Provider, MD   ARIPiprazole (ABILIFY PO) Take 15 mg by mouth daily     Historical Provider, MD   amlodipine (NORVASC) 10 MG tablet Take 5 mg by mouth daily     Historical Provider, MD   Citalopram Hydrobromide (CELEXA PO) Take 30 mg by mouth daily From [employer] [facility]     Historical Provider, MD   Blood Glucose Monitoring Suppl (FREESTYLE LITE) ARTIE Patient needs all supplies for qd testing. DX: 250.00 8/18/11   Chantell Belle MD        Allergies   Pcn [penicillins]    Social History     Social History     Tobacco History     Smoking Status  Never Smoker    Smokeless Tobacco Use  Never Used          Alcohol History     Alcohol Use Status  Not Currently Comment  hx of abuse          Drug Use     Drug Use Status  No          Sexual Activity     Sexually Active  Not Currently                Family History     Family History   Problem Relation Age of Onset    Heart Disease Mother         MI    Cancer Paternal Aunt         lung       ROS:  Negative except otherwise discussed in HPI      Physical Exam   BP (!) 157/90   Pulse 86   Temp 98.7 °F (37.1 °C) (Oral)   Resp 19   Ht 5' 8\" (1.727 m)   Wt 285 lb (129.3 kg)   SpO2 98%   BMI 43.33 kg/m²     General appearance: Obese, A&O x3, Not toxic, in mild apparent distress  HEENT:  Normal cephalic, atraumatic without obvious deformity. Pupils equal, round, and reactive to light. Extra ocular muscles intact. Conjunctivae/corneas clear. Neck: Supple, with full range of motion. No jugular venous distention. Trachea midline. Respiratory:  Normal respiratory effort. NL A/E bilat with scattered crackles bilat lung fields    Cardiovascular:  normal S1/S2 with no murmurs/gallops  Abdomen: Soft, non-tender, non-distended, no rigidity or peritoneal signs  Musculoskeletal: NL symmetrical A/PROM bilat U/L extremities   Skin: ++ leg edema bilat' +ve chronic stasis dermatitis w/ hyperpigmentation  Neurologic:  CN II-XII intact. NL symmetrical reflexes. NL gait and stance. NL Cerebellar exam. Power 5/5 all muscle groups U/L extremities.  Toes downgoing  Psychiatric: Alert and oriented, thought content appropriate, normal insight  Capillary Refill: Brisk,< 3 seconds   Peripheral Pulses: +2 palpable, equal Range    Est, Glom Filt Rate 64 (A) ml/min/1.73m2   Osmolality    Collection Time: 09/23/20  9:50 AM   Result Value Ref Range    Osmolality Calc 308.9 (H) 275.0 - 300.0 mOsmol/kg   Anion Gap    Collection Time: 09/23/20  9:50 AM   Result Value Ref Range    Anion Gap 16.0 8.0 - 16.0 meq/L   Blood gas, arterial    Collection Time: 09/23/20 11:06 AM   Result Value Ref Range    pH, Blood Gas 7.33 (L) 7.35 - 7.45    PCO2 59 (H) 35 - 45 mmhg    PO2 66 (L) 71 - 104 mmhg    HCO3 31 (H) 23 - 28 mmol/l    Base Excess (Calculated) 3.4 (H) -2.5 - 2.5 mmol/l    O2 Sat 91 %    IFIO2 5     DEVICE Cannula     Michael Test Positive     Source: L Radial     COLLECTED BY: [id number]         Imaging/Diagnostics Last 24 Hours   Xr Chest Portable    Result Date: 9/23/2020  PROCEDURE: XR CHEST PORTABLE CLINICAL INFORMATION: sob. COMPARISON: Chest x-ray dated 10 September 2020 TECHNIQUE: AP upright view of the chest. FINDINGS: There is extensive bilateral pulmonary opacification, worse than on previous study dated 10 September 2020. These findings may represent inflammatory process. There are no effusions. There is borderline cardiomegaly. 1. Bilateral pulmonary opacification worse than on previous study dated 10 September 2020. This may represent worsening inflammatory process, possibly Covid 19 infection. Please correlate clinically 2. Borderline cardiomegaly. **This report has been created using voice recognition software. It may contain minor errors which are inherent in voice recognition technology. ** Final report electronically signed by DR Nelly Longoria on 9/23/2020 10:47 AM      Assessment        Plan   · Acute hypoxic/hypercapneic respiratory failure:  possible sequelae of COVID-19 pneumonia w/ superimposed CAP, started on Azithro and Ceftriaxone and IV Dexamethasone; resp cultures sent.  on NRB at ED however; pt has high SaO2; RN aware to wean to NC as tolerated and if not will consider BiPAP; given ongoing hypoxia and urinary retention w/ prn bladder scan  · PT/OT to see when stable  · Code status: Full code      Consultations Ordered:  STR ED TO IP CONSULT  With RN in room, patient was updated about and agreed upon the treatment plan, all the questions and concerns were addressed. Patient was seen in PPE (PERSONAL PROTECTIVE EQUIPMENT). Patient was interviewed in Strict Airborne and Droplet Precautions. With RN in room, patient was updated about and agreed upon the treatment plan, all the questions and concerns were addressed. Patient was seen in PPE (PERSONAL PROTECTIVE EQUIPMENT). Patient was interviewed in Strict Airborne and Droplet Precautions.     Electronically signed by Evaristo Wyatt MD on 9/23/20 at 12:06 PM EDT

## 2020-09-23 NOTE — PLAN OF CARE
Problem: Falls - Risk of:  Goal: Will remain free from falls  Description: Will remain free from falls  Outcome: Ongoing  Note: Free from falls this shift, at this time. Call light and bedside table within reach. Bed alarm on. Bed wheels locked and bed in lowest position. Pt oriented to own abilities and is encouraged to use call light for needs. Problem: Falls - Risk of:  Goal: Absence of physical injury  9/23/2020 1945 by Elisa Hernandez RN  Outcome: Ongoing  Note: Pt is absent of physical injury this shift. Will continue to monitor. Problem: Skin Integrity:  Goal: Will show no infection signs and symptoms  Description: Will show no infection signs and symptoms  Outcome: Ongoing  Note: Pt is not symptomatic during this shift. Will continue to monitor. Problem: Skin Integrity:  Goal: Absence of new skin breakdown  Description: Absence of new skin breakdown  Outcome: Ongoing  Note: Pt has no new skin breakdown this shift. Will continue to monitor. Problem: Discharge Planning:  Goal: Discharged to appropriate level of care  Description: Discharged to appropriate level of care  Outcome: Ongoing  Note: Pt's discharge planning is pending depending on medical course. Problem: Pain:  Goal: Pain level will decrease  Description: Pain level will decrease  Outcome: Ongoing  Note: Pt rated pain 7/10 on bilateral legs, pt was encouraged to rest and pain remained the same. Pt is too lethargic to receive any pain medication. Problem: Pain:  Goal: Control of acute pain  Description: Control of acute pain  Outcome: Ongoing  Note: Pt rated pain 7/10 on bilateral legs, pt was encouraged to rest and pain remained the same. Pt is too lethargic to receive any pain medication. Problem: Pain:  Goal: Control of chronic pain  Description: Control of chronic pain  Outcome: Ongoing  Note: Pt rated pain 7/10 on bilateral legs, pt was encouraged to rest and pain remained the same.  Pt is too lethargic to receive any pain medication. Problem: Urinary Retention:  Goal: Urinary elimination within specified parameters  Description: Urinary elimination within specified parameters  Outcome: Ongoing  Note: Pt has mason catheter to help with elimination and pt has tolerated mason well. Will continue to assess and monitor. Problem: Urinary Retention:  Goal: Able to perform urinary catheter care  Description: Able to perform urinary catheter care  Outcome: Ongoing  Note: Pt is unable to take care of self due to being lethargic. Pt was encouraged to participate in care. Problem: Urinary Retention:  Goal: Ability to reestablish a normal urinary elimination pattern will improve - after catheter removal  Description: Ability to reestablish a normal urinary elimination pattern will improve - after catheter removal  Outcome: Ongoing  Note: Pt has mason catheter to help with elimination and pt has tolerated mason well. Will continue to assess and monitor. Problem: Urinary Retention:  Goal: Ability to recognize the need to void and respond appropriately will improve  Description: Ability to recognize the need to void and respond appropriately will improve  Outcome: Ongoing  Note: Pt has mason catheter to help with elimination and pt has tolerated mason well. Will continue to assess and monitor. Problem: Urinary Retention:  Goal: Absence of postvoid residual urine  Description: Absence of postvoid residual urine  Outcome: Ongoing  Note: Pt has mason catheter to help with elimination and pt has tolerated mason well. Will continue to assess and monitor. Problem: Urinary Retention:  Goal: Able to perform urinary self-catheterization  Description: Able to perform urinary self-catheterization  Outcome: Completed   Care plan reviewed with patient. Patient verbalized understanding of the plan of care and contribute to goal setting.

## 2020-09-23 NOTE — ED NOTES
Pt resting in bed with side rails up 2x2 and call light in reach. Vital signs assessed and stable. IV site checked. Pt updated on plan of care. Pt voiced understanding. Pt verbalized no other needs or concerns at this time.         Briana Small RN  09/23/20 [id number]

## 2020-09-23 NOTE — PROGRESS NOTES
Patient is currently on bipap, 40% 12/10 sat 94% BUT he is breathing 40-50's breath per minute, volumes of less than 200. attempted to instruct him to slow his breathing and take deeper breaths without change.  please advise thanks. -paged to [employer] [facility]	Lamar PA

## 2020-09-23 NOTE — PROGRESS NOTES
This RN perfect served messaged Dr. Suman Mccartney from hospitalist about pt being on 15 liters of nonrebreather and he is breathing heavy in his abdomen. He has used bipap in the past. This RN asked if he could be on bipap? The bladder scan showed over 900 three times. Also this RN asked if he can have a mason placed.

## 2020-09-23 NOTE — ED NOTES
ED to inpatient nurses report    Chief Complaint   Patient presents with    Fatigue      Present to ED from home  LOC: alert and orientated to name, place, date  Vital signs   Vitals:    09/23/20 0936 09/23/20 0946 09/23/20 1125 09/23/20 1224   BP: 124/74  (!) 157/90 (!) 157/83   Pulse: 88 89 86 88   Resp: 19      Temp:       TempSrc:       SpO2: 100% 96% 98% 99%   Weight: 285 lb (129.3 kg)      Height: 5' 8\" (1.727 m)         Oxygen Baseline 96%    Current needs required 15L no- rebreather on 6L NC @ home  Bipap/Cpap No  LDAs:   Peripheral IV 09/23/20 Right Wrist (Active)   Site Assessment Clean;Dry; Intact 09/23/20 1224   Line Status Normal saline locked 09/23/20 1224   Dressing Status Intact;Dry;Clean 09/23/20 1224   Dressing Intervention New 09/23/20 0941     Mobility: Fully dependent  Pending ED orders: none  Present condition: stable     Electronically signed by Daryle Reno, RN on 9/23/2020 at 1:15 PM       Daryle Reno, RN  09/23/20 1316

## 2020-09-23 NOTE — ED NOTES
Patient presents to the ED via EMS with complaints of feeling fatigued. States he has felt this way for 2 days. States he tested positive for COVID 2 weeks ago and his last day of quarantine is Sunday. Denies nausea vomiting or diarrhea. Patient placed on 6L via nasal cannula via EMS due to being short of breath and patient stating that is what he is on at home.       Joseanyi Heidi  09/23/20 7490

## 2020-09-23 NOTE — ED NOTES
PT in bed. PT fell asleep and sating 79% on 5L NC. PT placed on non-rebreather and woke up to take deep breathes. PT came up to 95% on non re breather.       Jimmie Tomas RN  09/23/20 [id number]

## 2020-09-23 NOTE — ACP (ADVANCE CARE PLANNING)
Advance Care Planning     Advance Care Planning Activator (Inpatient)  Conversation Note      Date of ACP Conversation: 9/23/2020    Conversation Conducted with: Patient with Decision Making Capacity    ACP Activator: Lauren Mcgovern    \"Who would you like to name as your primary health care decision-maker? \"               Name: Francisco Javier Sanabria          Relationship:            Phone number: [phone]  \"Can this person be reached easily? \" Yes  \"Who would you like to name as your back-up decision maker? \"   Name: None        Relationship: None          Phone number: None  \"Can this person be reached easily? \" No    Care Preferences    Ventilation: \"If you were in your present state of health and suddenly became very ill and were unable to breathe on your own, what would your preference be about the use of a ventilator (breathing machine) if it were available to you? \"      Would the patient desire the use of ventilator (breathing machine)?: yes    \"If your health worsens and it becomes clear that your chance of recovery is unlikely, what would your preference be about the use of a ventilator (breathing machine) if it were available to you? \"     Would the patient desire the use of ventilator (breathing machine)?: No      Resuscitation  \"CPR works best to restart the heart when there is a sudden event, like a heart attack, in someone who is otherwise healthy. Unfortunately, CPR does not typically restart the heart for people who have serious health conditions or who are very sick. \"    \"In the event your heart stopped as a result of an underlying serious health condition, would you want attempts to be made to restart your heart (answer \"yes\" for attempt to resuscitate) or would you prefer a natural death (answer \"no\" for do not attempt to resuscitate)? \" yes     [] Yes   [x] No   Educated Patient / Brent Louis regarding differences between Advance Directives and portable DNR orders.     Length of ACP Conversation in minutes:      Conversation Outcomes:  [x] ACP discussion completed  [] Existing advance directive reviewed with patient; no changes to patient's previously recorded wishes  [] New Advance Directive completed  [] Portable Do Not Rescitate prepared for Provider review and signature  [] POLST/POST/MOLST/MOST prepared for Provider review and signature      Follow-up plan:    [] Schedule follow-up conversation to continue planning  [x] Referred individual to Provider for additional questions/concerns   [] Advised patient/agent/surrogate to review completed ACP document and update if needed with changes in condition, patient preferences or care setting    [] This note routed to one or more involved healthcare providers      This staff was not able to spend much time discussing care with the patient. He was sleeping the first 2 attempts and then was being transported on the 3rd attempt. - Patient is accepting of vent and CPR. Patient would like AD completed while in the hospital Had asked for it during the last hospital stay (earlier this summer) and was discharged before completion.

## 2020-09-23 NOTE — ED TRIAGE NOTES
PT in bed. PT stated that he has had Covid 19 for two weeks. PT stated that last night he started having increased shortness of breathe. PT stated that he has been more tired the last two days. PT in no distress. PT dropped to 86% on 5L nasal cannula. PT placed on non rebreather 15L and is 100%.

## 2020-09-23 NOTE — PROGRESS NOTES
This RN tried to admit pt and asked pt questions but pt was too drowsy resulting in pt only giving short answers. This RN tried to awaken pt to answer longer questions but pt went back to sleep. Pt would only respond to voice.

## 2020-09-23 NOTE — ED NOTES
Pt resting in bed with side rails up 2x2 and call light in reach. Vital signs assessed and stable. IV site checked. Pt updated on plan of care. Pt voiced understanding. Pt verbalized no other needs or concerns at this time.         Annie Merchant RN  09/23/20 2783

## 2020-09-24 ENCOUNTER — APPOINTMENT (OUTPATIENT)
Dept: GENERAL RADIOLOGY | Age: 50
DRG: 870 | End: 2020-09-24
Payer: MEDICARE

## 2020-09-24 LAB
ABO: NORMAL
ACINETOBACTER CALCOACETICUS-BAUMANNII BY PCR: NOT DETECTED
ADENOVIRUS BY PCR: NOT DETECTED
ALBUMIN SERPL-MCNC: 3.2 G/DL (ref 3.5–5.1)
ALP BLD-CCNC: 61 U/L (ref 38–126)
ALT SERPL-CCNC: 43 U/L (ref 11–66)
ANION GAP SERPL CALCULATED.3IONS-SCNC: 19 MEQ/L (ref 8–16)
ANISOCYTOSIS: PRESENT
ANTIBODY SCREEN: NORMAL
AST SERPL-CCNC: 24 U/L (ref 5–40)
BASOPHILS # BLD: 0.1 %
BASOPHILS ABSOLUTE: 0 THOU/MM3 (ref 0–0.1)
BILIRUB SERPL-MCNC: 0.9 MG/DL (ref 0.3–1.2)
BILIRUBIN DIRECT: 0.6 MG/DL (ref 0–0.3)
BUN BLDV-MCNC: 19 MG/DL (ref 7–22)
CALCIUM SERPL-MCNC: 8.7 MG/DL (ref 8.5–10.5)
CHLAMYDIA PNEUMONIAE BY PCR: NOT DETECTED
CHLORIDE BLD-SCNC: 106 MEQ/L (ref 98–111)
CO2: 18 MEQ/L (ref 23–33)
CORONAVIRUS PCR: NOT DETECTED
CREAT SERPL-MCNC: 1.3 MG/DL (ref 0.4–1.2)
ENTEROBACTER CLOACAE COMPLEX BY PCR: NOT DETECTED
EOSINOPHIL # BLD: 0 %
EOSINOPHILS ABSOLUTE: 0 THOU/MM3 (ref 0–0.4)
ERYTHROCYTE [DISTWIDTH] IN BLOOD BY AUTOMATED COUNT: 18.2 % (ref 11.5–14.5)
ERYTHROCYTE [DISTWIDTH] IN BLOOD BY AUTOMATED COUNT: 66.9 FL (ref 35–45)
ESCHERICHIA COLI BY PCR: NOT DETECTED
GFR SERPL CREATININE-BSD FRML MDRD: 70 ML/MIN/1.73M2
GLUCOSE BLD-MCNC: 150 MG/DL (ref 70–108)
GLUCOSE BLD-MCNC: 153 MG/DL (ref 70–108)
GLUCOSE BLD-MCNC: 167 MG/DL (ref 70–108)
GLUCOSE BLD-MCNC: 177 MG/DL (ref 70–108)
GLUCOSE BLD-MCNC: 183 MG/DL (ref 70–108)
GLUCOSE BLD-MCNC: 209 MG/DL (ref 70–108)
GLUCOSE BLD-MCNC: 214 MG/DL (ref 70–108)
GLUCOSE BLD-MCNC: 233 MG/DL (ref 70–108)
GLUCOSE BLD-MCNC: 275 MG/DL (ref 70–108)
GLUCOSE BLD-MCNC: 288 MG/DL (ref 70–108)
GLUCOSE BLD-MCNC: 293 MG/DL (ref 70–108)
GLUCOSE BLD-MCNC: 313 MG/DL (ref 70–108)
GLUCOSE BLD-MCNC: 315 MG/DL (ref 70–108)
GLUCOSE BLD-MCNC: 330 MG/DL (ref 70–108)
GLUCOSE BLD-MCNC: 334 MG/DL (ref 70–108)
GLUCOSE BLD-MCNC: 344 MG/DL (ref 70–108)
GLUCOSE BLD-MCNC: 352 MG/DL (ref 70–108)
GLUCOSE BLD-MCNC: 369 MG/DL (ref 70–108)
HAEMOPHILUS INFLUENZAE BY PCR: NOT DETECTED
HCT VFR BLD CALC: 37.4 % (ref 42–52)
HEMOGLOBIN: 11 GM/DL (ref 14–18)
IMMATURE GRANS (ABS): 0.06 THOU/MM3 (ref 0–0.07)
IMMATURE GRANULOCYTES: 0.5 %
INFLUENZA A BY PCR: NOT DETECTED
INFLUENZA B BY PCR: NOT DETECTED
KLEBSIELLA AEROGENES BY PCR: NOT DETECTED
KLEBSIELLA OXYTOCA BY PCR: NOT DETECTED
KLEBSIELLA PNEUMONIAE GROUP BY PCR: NOT DETECTED
LEGIONELLA PNEUMOPHILIA BY PCR: NOT DETECTED
LV EF: 60 %
LVEF MODALITY: NORMAL
LYMPHOCYTES # BLD: 4.1 %
LYMPHOCYTES ABSOLUTE: 0.5 THOU/MM3 (ref 1–4.8)
MAGNESIUM: 2.1 MG/DL (ref 1.6–2.4)
MCH RBC QN AUTO: 29.3 PG (ref 26–33)
MCHC RBC AUTO-ENTMCNC: 29.4 GM/DL (ref 32.2–35.5)
MCV RBC AUTO: 99.7 FL (ref 80–94)
METAPNEUMOVIRUS BY PCR: NOT DETECTED
MONOCYTES # BLD: 2.6 %
MONOCYTES ABSOLUTE: 0.3 THOU/MM3 (ref 0.4–1.3)
MORAXELLA CATARRHALIS BY PCR: NOT DETECTED
MYCOPLASMA PNEUMONIAE BY PCR: NOT DETECTED
NUCLEATED RED BLOOD CELLS: 0 /100 WBC
PARAINFLUENZA VIRUS BY PCR: NOT DETECTED
PERFORMING LAB: NORMAL
PLATELET # BLD: 273 THOU/MM3 (ref 130–400)
PMV BLD AUTO: 10.7 FL (ref 9.4–12.4)
POTASSIUM SERPL-SCNC: 5.5 MEQ/L (ref 3.5–5.2)
PROCALCITONIN: 0.38 NG/ML (ref 0.01–0.09)
PROTEUS SPECIES BY PCR: NOT DETECTED
PSEUDOMONAS AERUGINOSA BY PCR: NOT DETECTED
RBC # BLD: 3.75 MILL/MM3 (ref 4.7–6.1)
REPORT: NORMAL
RESISTANT GENE CTX-M BY PCR: NORMAL
RESISTANT GENE IMP BY PCR: NORMAL
RESISTANT GENE KPC BY PCR: NORMAL
RESISTANT GENE MECA/C & MREJ BY PCR: NORMAL
RESISTANT GENE NDM BY PCR: NORMAL
RESISTANT GENE OXA-48-LIKE BY PCR: NORMAL
RESISTANT GENE VIM BY PCR: NORMAL
RESPIRATORY SYNCYTIAL VIRUS BY PCR: NOT DETECTED
RH FACTOR: NORMAL
RHINOVIRUS ENTEROVIRUS PCR: NOT DETECTED
SARS-COV-2, NAAT: NOT DETECTED
SARS-COV-2: NOT DETECTED
SEG NEUTROPHILS: 92.7 %
SEGMENTED NEUTROPHILS ABSOLUTE COUNT: 10.5 THOU/MM3 (ref 1.8–7.7)
SERRATIA MARCESCENS BY PCR: NOT DETECTED
SODIUM BLD-SCNC: 143 MEQ/L (ref 135–145)
SOURCE: NORMAL
SPECIMEN ACCEPTABILITY: NORMAL
STAPH AUREUS BY PCR: NOT DETECTED
STREP AGALACTIAE BY PCR: NOT DETECTED
STREP PNEUMONIAE BY PCR: NOT DETECTED
STREP PYOGENES BY PCR: NOT DETECTED
TOTAL PROTEIN: 6.7 G/DL (ref 6.1–8)
WBC # BLD: 11.3 THOU/MM3 (ref 4.8–10.8)

## 2020-09-24 PROCEDURE — 36415 COLL VENOUS BLD VENIPUNCTURE: CPT

## 2020-09-24 PROCEDURE — 6370000000 HC RX 637 (ALT 250 FOR IP): Performed by: HOSPITALIST

## 2020-09-24 PROCEDURE — 86900 BLOOD TYPING SEROLOGIC ABO: CPT

## 2020-09-24 PROCEDURE — 6370000000 HC RX 637 (ALT 250 FOR IP): Performed by: NURSE PRACTITIONER

## 2020-09-24 PROCEDURE — 2580000003 HC RX 258: Performed by: NURSE PRACTITIONER

## 2020-09-24 PROCEDURE — 6370000000 HC RX 637 (ALT 250 FOR IP): Performed by: INTERNAL MEDICINE

## 2020-09-24 PROCEDURE — 86850 RBC ANTIBODY SCREEN: CPT

## 2020-09-24 PROCEDURE — 2500000003 HC RX 250 WO HCPCS: Performed by: NURSE PRACTITIONER

## 2020-09-24 PROCEDURE — 84145 PROCALCITONIN (PCT): CPT

## 2020-09-24 PROCEDURE — 83735 ASSAY OF MAGNESIUM: CPT

## 2020-09-24 PROCEDURE — 99291 CRITICAL CARE FIRST HOUR: CPT | Performed by: INTERNAL MEDICINE

## 2020-09-24 PROCEDURE — 6360000002 HC RX W HCPCS: Performed by: HOSPITALIST

## 2020-09-24 PROCEDURE — 02HV33Z INSERTION OF INFUSION DEVICE INTO SUPERIOR VENA CAVA, PERCUTANEOUS APPROACH: ICD-10-PCS | Performed by: INTERNAL MEDICINE

## 2020-09-24 PROCEDURE — 94003 VENT MGMT INPAT SUBQ DAY: CPT

## 2020-09-24 PROCEDURE — 6360000002 HC RX W HCPCS: Performed by: NURSE PRACTITIONER

## 2020-09-24 PROCEDURE — 2100000000 HC CCU R&B

## 2020-09-24 PROCEDURE — P9045 ALBUMIN (HUMAN), 5%, 250 ML: HCPCS | Performed by: NURSE PRACTITIONER

## 2020-09-24 PROCEDURE — 87040 BLOOD CULTURE FOR BACTERIA: CPT

## 2020-09-24 PROCEDURE — 85025 COMPLETE CBC W/AUTO DIFF WBC: CPT

## 2020-09-24 PROCEDURE — P9059 PLASMA, FRZ BETWEEN 8-24HOUR: HCPCS

## 2020-09-24 PROCEDURE — 2700000000 HC OXYGEN THERAPY PER DAY

## 2020-09-24 PROCEDURE — 94640 AIRWAY INHALATION TREATMENT: CPT

## 2020-09-24 PROCEDURE — 71045 X-RAY EXAM CHEST 1 VIEW: CPT

## 2020-09-24 PROCEDURE — 2580000003 HC RX 258: Performed by: STUDENT IN AN ORGANIZED HEALTH CARE EDUCATION/TRAINING PROGRAM

## 2020-09-24 PROCEDURE — 80053 COMPREHEN METABOLIC PANEL: CPT

## 2020-09-24 PROCEDURE — 2500000003 HC RX 250 WO HCPCS

## 2020-09-24 PROCEDURE — 36430 TRANSFUSION BLD/BLD COMPNT: CPT

## 2020-09-24 PROCEDURE — 36556 INSERT NON-TUNNEL CV CATH: CPT | Performed by: INTERNAL MEDICINE

## 2020-09-24 PROCEDURE — 86901 BLOOD TYPING SEROLOGIC RH(D): CPT

## 2020-09-24 PROCEDURE — 82248 BILIRUBIN DIRECT: CPT

## 2020-09-24 PROCEDURE — 6370000000 HC RX 637 (ALT 250 FOR IP): Performed by: STUDENT IN AN ORGANIZED HEALTH CARE EDUCATION/TRAINING PROGRAM

## 2020-09-24 PROCEDURE — XW14325 TRANSFUSION OF CONVALESCENT PLASMA (NONAUTOLOGOUS) INTO CENTRAL VEIN, PERCUTANEOUS APPROACH, NEW TECHNOLOGY GROUP 5: ICD-10-PCS | Performed by: INTERNAL MEDICINE

## 2020-09-24 PROCEDURE — 93306 TTE W/DOPPLER COMPLETE: CPT

## 2020-09-24 PROCEDURE — 82948 REAGENT STRIP/BLOOD GLUCOSE: CPT

## 2020-09-24 PROCEDURE — 87086 URINE CULTURE/COLONY COUNT: CPT

## 2020-09-24 PROCEDURE — U0002 COVID-19 LAB TEST NON-CDC: HCPCS

## 2020-09-24 PROCEDURE — 2580000003 HC RX 258: Performed by: HOSPITALIST

## 2020-09-24 PROCEDURE — 94761 N-INVAS EAR/PLS OXIMETRY MLT: CPT

## 2020-09-24 RX ORDER — ALBUMIN, HUMAN INJ 5% 5 %
25 SOLUTION INTRAVENOUS ONCE
Status: COMPLETED | OUTPATIENT
Start: 2020-09-24 | End: 2020-09-24

## 2020-09-24 RX ORDER — SODIUM CHLORIDE 0.9 % (FLUSH) 0.9 %
10 SYRINGE (ML) INJECTION PRN
Status: DISCONTINUED | OUTPATIENT
Start: 2020-09-24 | End: 2020-10-23

## 2020-09-24 RX ORDER — POLYETHYLENE GLYCOL 3350 17 G/17G
17 POWDER, FOR SOLUTION ORAL EVERY OTHER DAY
Status: DISCONTINUED | OUTPATIENT
Start: 2020-09-24 | End: 2020-10-14

## 2020-09-24 RX ORDER — INSULIN GLARGINE 100 [IU]/ML
20 INJECTION, SOLUTION SUBCUTANEOUS 2 TIMES DAILY
Status: DISCONTINUED | OUTPATIENT
Start: 2020-09-24 | End: 2020-09-25

## 2020-09-24 RX ORDER — 0.9 % SODIUM CHLORIDE 0.9 %
20 INTRAVENOUS SOLUTION INTRAVENOUS ONCE
Status: COMPLETED | OUTPATIENT
Start: 2020-09-24 | End: 2020-09-24

## 2020-09-24 RX ORDER — INSULIN GLARGINE 100 [IU]/ML
10 INJECTION, SOLUTION SUBCUTANEOUS
Status: DISCONTINUED | OUTPATIENT
Start: 2020-09-24 | End: 2020-09-24

## 2020-09-24 RX ADMIN — MULTIPLE VITAMINS W/ MINERALS TAB 1 TABLET: TAB at 08:13

## 2020-09-24 RX ADMIN — BUSPIRONE HYDROCHLORIDE 10 MG: 10 TABLET ORAL at 08:13

## 2020-09-24 RX ADMIN — DEXAMETHASONE SODIUM PHOSPHATE 4 MG: 4 INJECTION, SOLUTION INTRAMUSCULAR; INTRAVENOUS at 08:13

## 2020-09-24 RX ADMIN — PROPOFOL 30 MCG/KG/MIN: 10 INJECTION, EMULSION INTRAVENOUS at 11:18

## 2020-09-24 RX ADMIN — FAMOTIDINE 20 MG: 10 INJECTION INTRAVENOUS at 08:13

## 2020-09-24 RX ADMIN — ENOXAPARIN SODIUM 130 MG: 150 INJECTION SUBCUTANEOUS at 17:10

## 2020-09-24 RX ADMIN — CITALOPRAM 30 MG: 20 TABLET, FILM COATED ORAL at 08:11

## 2020-09-24 RX ADMIN — SODIUM CHLORIDE: 9 INJECTION, SOLUTION INTRAVENOUS at 20:46

## 2020-09-24 RX ADMIN — Medication 2000 UNITS: at 08:11

## 2020-09-24 RX ADMIN — INSULIN GLARGINE 20 UNITS: 100 INJECTION, SOLUTION SUBCUTANEOUS at 17:09

## 2020-09-24 RX ADMIN — SODIUM CHLORIDE 5.1 UNITS/HR: 9 INJECTION, SOLUTION INTRAVENOUS at 06:17

## 2020-09-24 RX ADMIN — PIPERACILLIN AND TAZOBACTAM 3.38 G: 3; .375 INJECTION, POWDER, LYOPHILIZED, FOR SOLUTION INTRAVENOUS at 11:09

## 2020-09-24 RX ADMIN — SENNOSIDES 8.6 MG: 8.6 TABLET, FILM COATED ORAL at 20:44

## 2020-09-24 RX ADMIN — Medication 2 MCG/MIN: at 00:15

## 2020-09-24 RX ADMIN — SENNOSIDES 8.6 MG: 8.6 TABLET, FILM COATED ORAL at 08:13

## 2020-09-24 RX ADMIN — PROPOFOL 30 MCG/KG/MIN: 10 INJECTION, EMULSION INTRAVENOUS at 06:24

## 2020-09-24 RX ADMIN — FAMOTIDINE 20 MG: 10 INJECTION INTRAVENOUS at 20:46

## 2020-09-24 RX ADMIN — BUSPIRONE HYDROCHLORIDE 10 MG: 10 TABLET ORAL at 20:45

## 2020-09-24 RX ADMIN — PROPOFOL 30 MCG/KG/MIN: 10 INJECTION, EMULSION INTRAVENOUS at 06:48

## 2020-09-24 RX ADMIN — INSULIN GLARGINE 10 UNITS: 100 INJECTION, SOLUTION SUBCUTANEOUS at 06:30

## 2020-09-24 RX ADMIN — ENOXAPARIN SODIUM 130 MG: 150 INJECTION SUBCUTANEOUS at 06:33

## 2020-09-24 RX ADMIN — Medication 15 ML: at 20:45

## 2020-09-24 RX ADMIN — SODIUM CHLORIDE 20 ML: 9 INJECTION, SOLUTION INTRAVENOUS at 18:08

## 2020-09-24 RX ADMIN — INSULIN LISPRO 12 UNITS: 100 INJECTION, SOLUTION INTRAVENOUS; SUBCUTANEOUS at 03:51

## 2020-09-24 RX ADMIN — PROPOFOL 30 MCG/KG/MIN: 10 INJECTION, EMULSION INTRAVENOUS at 20:45

## 2020-09-24 RX ADMIN — GABAPENTIN 800 MG: 400 CAPSULE ORAL at 08:12

## 2020-09-24 RX ADMIN — ARIPIPRAZOLE 15 MG: 15 TABLET ORAL at 08:13

## 2020-09-24 RX ADMIN — PIPERACILLIN AND TAZOBACTAM 3.38 G: 3; .375 INJECTION, POWDER, LYOPHILIZED, FOR SOLUTION INTRAVENOUS at 19:30

## 2020-09-24 RX ADMIN — SODIUM CHLORIDE: 9 INJECTION, SOLUTION INTRAVENOUS at 10:41

## 2020-09-24 RX ADMIN — Medication 15 ML: at 08:13

## 2020-09-24 RX ADMIN — SODIUM CHLORIDE 13.9 UNITS/HR: 9 INJECTION, SOLUTION INTRAVENOUS at 20:46

## 2020-09-24 RX ADMIN — ASPIRIN 81 MG: 81 TABLET, CHEWABLE ORAL at 08:13

## 2020-09-24 RX ADMIN — DILTIAZEM HYDROCHLORIDE 60 MG: 60 TABLET, FILM COATED ORAL at 08:12

## 2020-09-24 RX ADMIN — INSULIN LISPRO 15 UNITS: 100 INJECTION, SOLUTION INTRAVENOUS; SUBCUTANEOUS at 00:18

## 2020-09-24 RX ADMIN — SODIUM CHLORIDE, PRESERVATIVE FREE 10 ML: 5 INJECTION INTRAVENOUS at 20:45

## 2020-09-24 RX ADMIN — POLYETHYLENE GLYCOL 3350 17 G: 17 POWDER, FOR SOLUTION ORAL at 12:22

## 2020-09-24 RX ADMIN — FOLIC ACID 1 MG: 1 TABLET ORAL at 08:13

## 2020-09-24 RX ADMIN — SODIUM CHLORIDE, PRESERVATIVE FREE 10 ML: 5 INJECTION INTRAVENOUS at 08:14

## 2020-09-24 RX ADMIN — ALLOPURINOL 200 MG: 100 TABLET ORAL at 08:14

## 2020-09-24 RX ADMIN — PROPOFOL 30 MCG/KG/MIN: 10 INJECTION, EMULSION INTRAVENOUS at 08:29

## 2020-09-24 RX ADMIN — ATORVASTATIN CALCIUM 40 MG: 40 TABLET, FILM COATED ORAL at 20:45

## 2020-09-24 RX ADMIN — SODIUM CHLORIDE 17.2 UNITS/HR: 9 INJECTION, SOLUTION INTRAVENOUS at 13:35

## 2020-09-24 RX ADMIN — GLYCOPYRROLATE AND FORMOTEROL FUMARATE 2 PUFF: 9; 4.8 AEROSOL, METERED RESPIRATORY (INHALATION) at 16:50

## 2020-09-24 RX ADMIN — PROPOFOL 30 MCG/KG/MIN: 10 INJECTION, EMULSION INTRAVENOUS at 15:18

## 2020-09-24 RX ADMIN — PROPOFOL 30 MCG/KG/MIN: 10 INJECTION, EMULSION INTRAVENOUS at 01:41

## 2020-09-24 RX ADMIN — PIPERACILLIN AND TAZOBACTAM 3.38 G: 3; .375 INJECTION, POWDER, LYOPHILIZED, FOR SOLUTION INTRAVENOUS at 04:00

## 2020-09-24 RX ADMIN — INSULIN GLARGINE 10 UNITS: 100 INJECTION, SOLUTION SUBCUTANEOUS at 00:17

## 2020-09-24 RX ADMIN — ALBUMIN (HUMAN) 25 G: 12.5 SOLUTION INTRAVENOUS at 02:21

## 2020-09-24 RX ADMIN — PROPOFOL 30 MCG/KG/MIN: 10 INJECTION, EMULSION INTRAVENOUS at 04:30

## 2020-09-24 ASSESSMENT — PAIN SCALES - GENERAL
PAINLEVEL_OUTOF10: 0

## 2020-09-24 ASSESSMENT — PAIN DESCRIPTION - PROGRESSION
CLINICAL_PROGRESSION: NOT CHANGED

## 2020-09-24 ASSESSMENT — PULMONARY FUNCTION TESTS
PIF_VALUE: 25
PIF_VALUE: 25
PIF_VALUE: 27
PIF_VALUE: 25
PIF_VALUE: 25
PIF_VALUE: 31
PIF_VALUE: 25

## 2020-09-24 NOTE — PROGRESS NOTES
[facility] 58  PHYSICAL THERAPY MISSED TREATMENT NOTE  [facility]    Date: 2020  Patient Name: Lary Lyle        MRN: [id number]   : 1970  (44 y.o.)  Gender: male                REASON FOR MISSED TREATMENT:  Pt was just intubated last night and also on \"pressors\". RN reports that pt is not appropriate today for early mobility and recommended to check tomorrow. Myna Dull.  Brooklyn Tompkins Louisa [address]

## 2020-09-24 NOTE — CARE COORDINATION
09/24/20 1240   Readmission Assessment   Number of Days since last admission? 1-7 days   Previous Disposition Home with Home Health   Who is being Interviewed   (From chart - patient on vent; only contact listed is his )   What was the patient's/caregiver's perception as to why they think they needed to return back to the hospital? Unable to obtain medications; Other (Comment)  (Called EMS d/t SOB)   Did you visit your Primary Care Physician after you left the hospital, before you returned this time? No   Why weren't you able to visit your PCP? Did not have an appointment   Did you see a specialist, such as Cardiac, Pulmonary, Orthopedic Physician, etc. after you left the hospital? No   Who advised the patient to return to the hospital? Self-referral   Does the patient report anything that got in the way of taking their medications?   (TAMELA)   In our efforts to provide the best possible care to you and others like you, can you think of anything that we could have done to help you after you left the hospital the first time, so that you might not have needed to return so soon?  Other (Comment)  (TAMELA d/t patient on vent; however, meds to beds so he would have had his meds.)

## 2020-09-24 NOTE — PROGRESS NOTES
Comprehensive Nutrition Assessment    Type and Reason for Visit:  Initial, Consult(TF ordering and management)    Nutrition Recommendations/Plan:   Start EN of Vital 1.2 at 10 ml/hr, if tolerates after 24 hours increase to 20 ml/hr  Free water flushes per MD    Nutrition Assessment:  Pt. nutritionally compromised AEB NPO, intubated. At risk for further nutrition compromise r/t admitted with acute respiratory failure with hypoxemia, obesity, elevated Hgb A1C, and underlying medical condition (hx: CAD, DM, GERD, Alcohol abuse, HLP, HTN, Vitamin D deficiency). Nutrition recommendations/interventions as per above. Malnutrition Assessment:  Malnutrition Status:  Insufficient data(+covid)    Context:  Acute Illness     Findings of the 6 clinical characteristics of malnutrition:  Energy Intake:  No significant decrease in energy intake(great appetite noted 9/14/20. NPO x 1 day)  Weight Loss:  (-14# in 7 weeks, note current weight is stated so not reliable)     Body Fat Loss:  Unable to assess(+covid patient)     Muscle Mass Loss:  Unable to assess(+covid)    Fluid Accumulation:  1 - Mild Extremities   Strength:  Not Performed    Estimated Daily Nutrient Needs:  Energy (kcal):  6828-1192 (8-11 kcals/kg for acute phase); Weight Used for Energy Requirements:  Current(129 kg  - stated)     Protein (g):  ~175 (2.5 grams/kg ideal weight); Weight Used for Protein Requirements:  Ideal(70 kg)        Fluid (ml/day):  per MD; Weight Used for Fluid Requirements:  (n/a)      Nutrition Related Findings:  +covid. Intubated 9/23/20. High rate diprivan. Hgb A1C: 9.9, MAP; 77, Potassium: 5.5, POC: 315. Patient was recent admit with a great appetite and was receiving between meal snacks. Meds: folvite, lanus, zosyn, glycolax, senokot, multivitamin, insulin drip, levophed.       Wounds:  Stage III(stage 3 pressure ulcer on hip)       Current Nutrition Therapies:    Current Tube Feeding (TF) Orders:  · Feeding Route: Orogastric(OGT

## 2020-09-24 NOTE — PLAN OF CARE
Problem: MECHANICAL VENTILATION  Goal: Normal spontaneous ventilation  Outcome: Ongoing  Note: Vent setting optimized to achieve target tidal volume, respiratory rate and ideal oxygen saturations. Patient is currently on 24/8 rate of 12 and 50%. Will continue to wean as patient tolerates. SBT will be performed when appropriate.

## 2020-09-24 NOTE — PROCEDURES
Central Line Placement: Medical necessity. Patient was placed in the attention position. Patient was placed in Trendelenburg position. Patient was prepped using Chlorhexidene prep. Patient was draped utilizing sterile gloves, hair net, mask, sterile gown and sterile OR towels. Maximum barrier precautions were utilized. Utilizing the left subclavian approach, patient received 5 cc of 1% lidocaine. Utilizing an introducer needle, it was placed subcutaneously advanced under the clavicle and leveled flat. It was subsequently advanced toward the thoracic inlet, until venous return was obtained. A guide wire was placed through the introducer needle. The introducer needle was subsequently withdrawn leaving the guide wire in place. Utilizing a scalpel, a small incision was made in the skin. A punch dilator was placed over the guide wire and subsequently withdrawn leaving the guide wire in place. A Triple lumen catheter was subsequently placed over the guide wire. The guide wire was subsequently withdrawn leaving this catheter in place. All ports had good venous return and were subsequently flushed with saline. The catheter was secured using 2.0 silk. Secondary cleansing with chlorhexidine prep was subsequently placed. Tegaderm with bio- patch dressing was utilized for secondary securing and protection. There were no complications. EBL:   Less than 5 mL      Indication:  REsp Failure    Electronically signed by Leanna Clemente.  Nehemiah Kahn MD

## 2020-09-24 NOTE — PROCEDURES
ICU PROCEDURE - ENDOTRACHEAL INTUBATION    Zaid Calabrese     MRN#: [id number]  20     Acct #:  [de-identified]    : 1970      INDICATION: respiratory failure    TIME OUT: taken    Permission obtained, risks/benefits reviewed:    ANESTHESIA:   [x]Ketamine-100 mg IV  []Ativan  [] Morphine  []Propofol-IV gtt and two 20 mg boluses  []Other medications:  Fentanyl 100 mg IV & 20 mg IV Nimbex      ESTIMATED BLOOD LOSS:  None. COMPLICATIONS:  [x]N/A  [] Other:    LARYNGOSCOPIC AIRWAY GRADE (CORMACK-LEHANE):[]1  []2a  [x]2b []3  []4        INTUBATION EQUIPMENT USED:  [x] Direct laryngoscope only    OUTCOME: Successful placement of #  8.0 Taperguard Evac endotracheal via   [x]Oral route    INSERTION DEPTH:  26   cm from   [x]lip           CONFIRMATION OF TUBE POSITION:   [x]Capnography - Strong & repeatable exhaled CO2 detection   [x]Multiple point auscultation   [x]SpO2 response   [x]STAT X-ray   []Bronchoscopic assessment    UNUSUAL FINDINGS: erythematous, edematous, and sanguineous drainage noted in oopharnyx    PROCEDURE:     Using direct laryngoscopy, the vocal cords were visualized and the endotracheal tube was placed through the cords under direct vision. Good breath sounds were auscultated bilaterally without sounds over abdomen.         Electronically signed by SANDRA Neil CNP on 2020 at 9:44 PM

## 2020-09-24 NOTE — CONSULTS
Intensivist Consult Note        Patient:  Leonie Montiel  YOB: 1970  Date of Service: 9/23/2020  MRN: [id number]   Acct:  [de-identified]   Primary Care Physician: Jericho Franklin MD    Chief Complaint: Dyspnea  Reason for consult:  Respiratory failure    Date of Service: Pt seen/examined in consultation on 9/23/2020     History Of Present Illness:  (Obtained per chart review as patient has decreased level of consciousness)    Leonie Montiel y.o. male who we are asked to see/evaluate by Mikhail Rooney MD for medical management of respiratory failure. Past medical history includes never smoker, COVID-19, KIARA (CPAP), DURAN, major depression/mood disorder, PAF, hypertension, hyperlipidemia, diabetes mellitis, HFpEF, CAD, BPH, and morbid obesity who presented to [facility]'s emergency department per EMS with worsening shortness of breath for 2 days and lower extremity leg swelling. Patient also reported fatigue. Per notes patient's  reported to RN patient was at [facility] two weeks ago and was discharged to his own apartment with home health. He was noted per his  that he was found without oxygen on day prior to admission and he had not been using his oxygen or taking his medications. Previously inpatient for COVID-19 on 9/6/2020 through 9/15/2020. COVID-19 not done on admission-will obtain. Patient was discharged on home oxygen 6 L with activity. Patient was admitted to  from emergency department. Patient was noted to continue to be tachypneic with increased work of breathing and decreased level of responsiveness despite BiPAP therapy. Patient transferred to ICU side of  for intubation. Upon evaluation patent very lethargic and difficult to arouse on BiPAP. Unable to assess ROS. Assessment and Plan:-    1.  Acute Hypoxic/Hypercapnic Respiratory Failure: Orally intubated secondary to tachypnea, dyspnea, decreased level of consciousness. Continue PCV mode of mechanical ventilation with goal peak pressure 35 or less and plateau pressure 30 or less. 2.  History COVID-19: CRP and LD elevated. Per review of notes patient was diagnosed in August and treated with a 3-day course of Decadron, Remdesivir, & azithromycin then again in September patient did receive Decadron and Remdesivir during last in pateint admission. Repeat COVID-19 screen. 3.  Severe Pneumonia: Chest x-ray demonstrates bilateral opacities. Procalcitonin 0.64 on admission. Lactic acid 0.8. Respiratory culture and molecular pneumonia panel-continue Zithromax and Rocephin pending results then narrow antibiotics. Continue steroids-decrease frequency. 4.  Elevated Troponin: Demand related. EKG negative for ischemic changes. Continue aspirin and statin. 5. Acute Kidney Injury on Chronic Kidney Disease stage II:  Baseline creatinine average 0.9-1.1. Creatinine elevated on admission at 1.4/BUN 18. FENa 0.4%-prerenal etiology. IV fluids. Hold diuretics. Trend BMP. 6.  Mild Hypernatremia: Plan per #5. 7.   Proximal Atrial Fibrillation: Currently in sinus rhythm. Continue calcium channel blocker. Therapeutic Lovenox started for anticoagulation. 8.  Normocytic Anemia: Hemoglobin on admission 11.3/hematocrit 36.8-decreased from baseline noted to be between 13 and 14. Stool for occult. 9.  Diabetes Mellitus Type #2 with Hyperglycemia: Hemoglobin A1C noted 9.9%. Hold Janumet. Send beta hydroxybutyrate as on admission glucose 395. Restart Lantus and high-dose sliding scale. Continue Neurontin. 10.  Chronic Diastolic Heart Failure:  Echocardiogram on 10/10/2019 demonstrated EF 55 to 60%. Patient reporting increased lower extremity edema on admission. Pro-BNP negative on admission. Weight appears to have increased by 4 pounds since discharge and patient with noted BLE edema however intravascularly dry as above. Repeat echocardiogram.  Hold Lasix.   Strict intake and output and daily weights. 11.  Essential Hypertension: Hold Norvasc and hydralazine as patient borderline hypotensive. 12.  Anxiety/Depression: Continue BuSpar, Celexa, & Abilify. 13.  Hyperlipidemia: Continue statin. 14.  History Gout: Continue allopurinol. 15.  Vitamin D Deficiency: Continue vitamin D supplements. 16. BPH with Associated Urine Retention: Patient did have approximately 1 L of urine retention and Dimas catheter had to be inserted. Urinalysis negative leukocytes, negative ketones, WBC 2-4, few budding yeast.  Unable to continue Flomax as it is not crushable. Follow culture. 17.  Obstructive Sleep Apnea: Home CPAP after extubated. 18.  Morbid Obesity: BMI 43.33. Weight loss education when appropriate.       Past Medical History:        Diagnosis Date    Arthritis     RHEUMATOID    Atrial fibrillation (HCC)     BPH (benign prostatic hyperplasia)     CAD (coronary artery disease)     Carpal tunnel syndrome on right     rt hand    Chronic gout     DM2 [facility]diabetes mellitus, type 2) ([facility])     GERD (gastroesophageal reflux disease)     Heart failure with preserved ejection fraction ([facility])     History of alcohol abuse     History of osteomyelitis     Hx of R foot wound, osteomyelitis July 2018 requiring surgery and IV Vanco    Hyperlipidemia     Hypertension, essential     Hypogonadism, male     Major depression     Mood disorder ([address]     Morbid obesity ([address]     Muscle weakness     DURAN (nonalcoholic steatohepatitis)     Obstructive sleep apnea treated with continuous positive airway pressure (CPAP)     KIARA (obstructive sleep apnea)     Vitamin D deficiency        Past Surgical History:        Procedure Laterality Date    FOOT SURGERY Right     2018, R foot osteomyelitis    HIP SURGERY Left 6/29/2020    bilateral hip steroid injection, Left HIP FIRST performed by Gunnar Ramirez MD at [facility]      as a baby       Home Medications:   No current facility-administered medications on file prior to encounter. Current Outpatient Medications on File Prior to Encounter   Medication Sig Dispense Refill    vitamin C (ASCORBIC ACID) 500 MG tablet Take 2 tablets by mouth daily 30 tablet 0    enoxaparin (LOVENOX) 120 MG/0.8ML injection Inject 0.87 mLs into the skin every 12 hours 30 Syringe 0    aspirin 81 MG chewable tablet Take 1 tablet by mouth daily 30 tablet 0    CHOLECALCIFEROL PO Take 2,000 Units by mouth daily      atorvastatin (LIPITOR) 40 MG tablet Take 40 mg by mouth nightly      acetaminophen (TYLENOL) 325 MG tablet Take 650 mg by mouth every 4 hours as needed for Pain      benzocaine (BABY ORAJEL) 7.5 % oral gel Take by mouth 4 times daily as needed for Pain (mouth pain) Take by mouth 3 times daily.  guaiFENesin (ROBITUSSIN) 100 MG/5ML syrup Take 200 mg by mouth every 4 hours as needed for Cough      oxyCODONE-acetaminophen (PERCOCET) 5-325 MG per tablet Take 1 tablet by mouth every 4 hours as needed for Pain.        busPIRone (BUSPAR) 10 MG tablet Take 10 mg by mouth 2 times daily       Trolamine Salicylate (ASPERCREME) 10 % LOTN Apply topically as needed for Pain 1 Bottle 0    pantoprazole (PROTONIX) 40 MG tablet Take 1 tablet by mouth daily 5 tablet 0    allopurinol (ZYLOPRIM) 300 MG tablet Take 1 tablet by mouth daily (Patient taking differently: Take 500 mg by mouth daily ) 90 tablet 0    allopurinol (ZYLOPRIM) 100 MG tablet Take 2 tablets by mouth daily 60 tablet 0    lidocaine (XYLOCAINE) 5 % ointment Apply topically as needed to painful areas on lower back, hips, knees, and feet 1 Tube 2    esomeprazole Magnesium (NEXIUM) 20 MG PACK Take 20 mg by mouth daily      hydrALAZINE (APRESOLINE) 50 MG tablet Take 50 mg by mouth 2 times daily       Wound Dressings (MAXORB EX) Apply topically      polyethylene glycol (GLYCOLAX) powder Take 17 g by mouth every other day       Multiple Vitamins-Minerals (THERAPEUTIC MULTIVITAMIN-MINERALS) tablet Take 1 tablet by mouth daily      Diaper Rash Products (PINXAV) OINT Apply topically      Probiotic Product (SOBIA-BID PROBIOTIC PO) Take 1 tablet by mouth daily       ondansetron (ZOFRAN) 4 MG tablet Take 4 mg by mouth every 8 hours as needed for Nausea or Vomiting      diltiazem (CARDIZEM) 60 MG tablet Take 60 mg by mouth 2 times daily      tamsulosin (FLOMAX) 0.4 MG capsule Take 0.4 mg by mouth nightly       folic acid (FOLVITE) 1 MG tablet Take 1 mg by mouth daily      furosemide (LASIX) 40 MG tablet Take 40 mg by mouth daily      gabapentin (NEURONTIN) 400 MG capsule Take 800 mg by mouth 2 times daily.  insulin lispro (HUMALOG) 100 UNIT/ML injection vial Inject into the skin 3 times daily (before meals) Per scale: 151-200=1 unit, 201-250=2 units, 251-300-3 units, 301-350=4 units, 351-400=5 units.  sitaGLIPtan-metformin (JANUMET)  MG per tablet Take 1 tablet by mouth 2 times daily (with meals)      insulin glargine (LANTUS) 100 UNIT/ML injection vial Inject 10 Units into the skin nightly       senna (SENOKOT) 8.6 MG tablet Take 1 tablet by mouth 2 times daily      diazepam (VALIUM) 5 MG tablet Take 5 mg by mouth every 6 hours as needed for Anxiety.  ARIPiprazole (ABILIFY PO) Take 15 mg by mouth daily       amlodipine (NORVASC) 10 MG tablet Take 5 mg by mouth daily       Citalopram Hydrobromide (CELEXA PO) Take 30 mg by mouth daily From [facility] professional services       Blood Glucose Monitoring Suppl (FREESTYLE LITE) ARTIE Patient needs all supplies for qd testing. DX: 250.00 1 Device 11       Allergies:    Pcn [penicillins]    Social History:    reports that he has never smoked. He has never used smokeless tobacco. He reports previous alcohol use. He reports that he does not use drugs.     Family History:       Problem Relation Age of Onset    Heart Disease Mother         MI    Cancer Paternal Aunt         lung       Diet:  DIET GENERAL; No Added Salt (3-4 GM)    Review of systems:   Pertinent positives as noted in the HPI. All other systems reviewed and negative. PHYSICAL EXAMINATION:  Vital signs:  Temperature 99.8, pulse 94, respirations 38, blood pressure 102/62, pulse ox 93% on 40% FIO2 per BiPAP. No intake/output data recorded. Wt Readings from Last 3 Encounters:   09/23/20 285 lb (129.3 kg)   09/15/20 281 lb 6.4 oz (127.6 kg)   08/21/20 (!) 306 lb 6.4 oz (139 kg)      Body mass index is 43.33 kg/m². CAM-ICU:   Obtunded. General:   Acute on chronically-ill appearing  male with significant tachypnea and dyspnea. HEENT:  normocephalic and atraumatic. No scleral icterus. PERR. Mucous membranes dry. Neck: supple. No thyromegaly. Lungs: rhonchi to auscultation diminished at bases without wheezes/rales. No retractions. Tachypnea and dyspnea. Cardiac: RRR. No JVD. Abdomen: soft. Nontender. Distant bowel sounds. Extremities:  No clubbing, cyanosis. BLE pre-tibial edema 1+ non-pitting. Vasculature: capillary refill < 3 seconds. Palpable dorsalis pedis pulses, 1+. Skin:  Normal for ethnicity,warm, and dry. Psych:  Confused. Lymph:  No supraclavicular adenopathy. Neurologic:  No focal deficit. No seizures. Data: (All radiographs, tracings, PFTs, and imaging are personally viewed and interpreted unless otherwise noted).  Telemetry:  NSR with PVC's.  Sodium 146, potassium 4.8, chloride 104, CO2 26, BUN 18, creatinine 1.4, anion gap 16.0, estimated GFR 64, magnesium 1.8, lactic acid 0.8, glucose 395, calcium 9.6, osmolality Calc 308.9, procalcitonin 0.64.  CRP 25.10, , proBNP 745.5, troponin T 0.028.  WBC 11.3, hemoglobin 11.3, hematocrit 36.8, platelet count 737.  INR 1.17, APTT 30.2, d-dimer 1540.0.    Influenza A/B antigen negative   Urinalysis demonstrated specific gravity greater than 1.030, 100 protein, greater 1000 glucose, negative leukocytes, WBCs 2-4, few budding yeast.   Urine creatinine 155.6, osmolality 713, sodium 62.  ABG: pH 7.34, PCO2 56, PO2 83, bicarb 30, base excess 3.4, oxygen saturation 95% on nonrebreather.  EKG normal sinus rhythm no acute ST T wave changes.  Chest x-ray demonstrates bilateral opacities. ICU PROPHYLAXIS/THERAPY:   Stress ulcer:  [] PPI Agent  [x] H2RA [] Sucralfate [] Other:   VTE:     [x] Enoxaparin    [] Warfarin [] NOAC [] PCD Device:Bilat LE   [] Heparin: [] Subcut / [] IV         [address]    Electronically signed by SANDRA Shipley CNP on 9/23/2020 at 8:26 PM    Patient seen by me. Case cussed with nurse practitioner. Patient requiring intubation. Patient with acute lung injury from COVID-19. Creatinine slightly elevated. Glucose control is suboptimal.  Electronically signed by Susie Jimenez.  Jerardo Marcelo MD.

## 2020-09-24 NOTE — PROGRESS NOTES
CRITICAL CARE PROGRESS NOTE      Patient:  Haydee Betts    Unit/Bed:4B-04/004-A  YOB: 1970  MRN: [id number]   PCP: Joseph Pearson MD  Date of Admission: 9/23/2020  Chief Complaint:-COVID-19    Assessment and Plan:    1. Acute on chronic hypoxic respiratory failure: Due to COVID. Patient was recently hospitalized for COVID-19 infection. It was noted per EHR patient was not wearing 6 L nasal cannula as directed. Patient became hypoxic and respiratory distress 9/23/2020, resulting in intubation. Patient remains on ventilator PCV+, measured rate 18, PEE 8, pressure support 24  2. Septic/hypovolemic shock: Due to severe pneumonia and volume depletion. Patient requiring Levophed to maintain MAP > 65. Patient currently receiving 0.9% normal saline at 100 mL/ hr  patient received 25g albumin. Patient appears to be intravascularly depleted. Continue fluid hydration, Levophed, hold Lasix  3. Severe pneumonia: Due to COVID-19 infection vs superimposed bacterial pneumonia. Chest x-ray demonstrating worsening bilateral pneumonia. Continue Zosyn. Procalcitonin trending downwards 0.38. Respiratory PCR negative. Current hospitalization patient testing negative for COVID-19. We will continue to treat for COVID-19, patient was previously treated with danazol, however due to patient being intubated and sedated danazol is unlikely to be given as it cannot be crushed. Will initiate convalescent plasma therapy 2 units. Continue Decadron  4 mg daily for total of 4 doses ( day 1/4). Continue aspirin 81 mg daily for treatment and prevention microthrombosis which is noted to be common in COVID/ARDS conditions. 4. Acute lung injury: Due subsequent therapy for recurrent  COVID-19 infection.  We will continue mechanical ventilator protective therapies, including keeping peak pressure < 35. we will continue ventilator safety protocols to ensure additional lung injury is not contributing to patient condition    5. Uncontrolled type 2 diabetes: Noncompliant, last A1c 9.9%. beta hydroxybutyrate 22.95. Patient currently on insulin drip with rate of 0.5 mL an hour. Continue gabapentin 800 mg twice daily. Increase Lantus 20 units twice daily. 6. History of BPH with associated urinary retention: Patient had about 1 L of urine retention and Dimas catheter had been inserted. Patient takes Flomax at home, unable to continue use of Flomax since it cannot be crushed. We will continue to follow culture  7. Chronic diastolic heart failure: Echocardiogram on 10/10/2019 demonstrated EF 55 to 60%. Patient previously reported increased lower extremity edema prior admission 1 week ago. proBNP negative. Patient weight increased 4 pounds since last admission. Patient currently has bilateral lower extremity edema however intravascularly appears dry. Hold Lasix. Strict I's and O's and daily weights. Echo pending   2. History of paroxysmal atrial fibrillation: Rate controlled - Cardizem 60mg BID.  Patient was previously on Eliquis however due to weight being greater than 120 kg. Lovenox 120 mg subcutaneous BID is indicated as treatment of choice for anticoagulation;   8. Morbid obesity: BMI 43.33. Will educate patient on importance of weight loss when appropriate  9. Obstructive sleep apnea: We will encourage patient to wear home CPAP after extubated  10. Vitamin D deficiency: Continue vitamin D supplementation  11. History of hyperlipidemia: Continue Lipitor 40 mg nightly  12. History of gout: Continue allopurinol  13. History of essential hypertension:   Hold Norvasc and hydralazine as patient is currently intubated and requiring pressor therapy  14. History of anxiety/depression: Continue BuSpar, Celexa and Abilify      INITIAL H AND P AND ICU COURSE:  49-year-old male presented to the [facility] emergency department complaining of progressive worsening shortness of breath for the last 2 days.   Patient denies chest pain, fever, chills, diarrhea, urinary symptoms. Patient admits to bilateral lower extremity committee swelling. Patient admits that he is unsure if he has been taking his medication as directed from prior discharge. Patient has a significant past medical history for obstructive sleep apnea which uses CPAP machine, obesity, anxiety, depression, hypertension, hyperlipidemia, rheumatoid arthritis, type 2 diabetes and atrial fibrillation. Patient was diagnosed with COVID19  pneumonia and admitted to [facility] last month. Carly Graham was treated at that time with Decadron, Remdesivir and azithromycin patient was discharged on 2020.  2020 patient was brought back to the emergency room department via EMS because he was found to be hypoxic at [facility].  Patient was treated with Decadron 6 mg for 3 days and discharged 9/15/20 to home apartment with home health. Prior to discharge on 9/15/2020 home oxygen evaluation was performed prior to discharge on 9/15/2020. Patient was requiring 6 L of oxygen with activity however did not require oxygen at rest.    On arrival to [facility] patient states he is having increasing shortness of breath.   However denies chest pain, nausea, vomiting, diarrhea, fatigue, fever, chills.       Past Medical History: Vitamin D deficiency, obstructive sleep apnea uses CPAP at night, nonalcoholic steatohepatitis, morbid obesity, major depression, hypogonadism, essential hypertension, hyperlipidemia, history of osteomyelitis, history of alcohol abuse, heart failure with preserved ejection fraction, GERD, type 2 diabetes, chronic gout, CAD, BPH, atrial fibrillation, rheumatoid arthritis  Family History: Mother-heart disease with history of MI at 48years of age, .  Paternal aunt -lung cancer  Social History: History of alcohol abuse, lifetime non-smoker, denies substance abuse, , [facility]    ROS   Unable to obtain secondary to patient intubated and sedated    Scheduled Meds:   polyethylene glycol  17 g Oral Every Other Day    piperacillin-tazobactam  3.375 g Intravenous Q8H    sodium chloride  20 mL Intravenous Once    insulin glargine  20 Units Subcutaneous BID    allopurinol  200 mg Oral Daily    [Held by provider] amLODIPine  5 mg Oral Daily    ARIPiprazole  15 mg Oral Daily    aspirin  81 mg Oral Daily    atorvastatin  40 mg Oral Nightly    busPIRone  10 mg Oral BID    citalopram  30 mg Oral Daily    Vitamin D  2,000 Units Oral Daily    dilTIAZem  60 mg Oral BID    folic acid  1 mg Oral Daily    [Held by provider] furosemide  40 mg Oral Daily    gabapentin  800 mg Oral BID    [Held by provider] hydrALAZINE  50 mg Oral BID    enoxaparin  1 mg/kg Subcutaneous Q12H    [Held by provider] tamsulosin  0.4 mg Oral Nightly    sodium chloride flush  10 mL Intravenous 2 times per day    chlorhexidine  15 mL Mouth/Throat BID    famotidine (PEPCID) injection  20 mg Intravenous BID    glycopyrrolate-formoterol  2 puff Inhalation BID    dexamethasone  4 mg Intravenous Daily    therapeutic multivitamin-minerals  1 tablet Oral Daily    senna  1 tablet Oral BID     Continuous Infusions:   norepinephrine 12 mcg/min (09/24/20 0348)    insulin      sodium chloride 100 mL/hr at 09/24/20 0430    propofol 30 mcg/kg/min (09/24/20 0430)    dextrose         PHYSICAL EXAMINATION:  T: 99.0.  P: 84. RR: 32. B/P: 108/66. FiO2: 50%. O2 Sat: 98.  I/O:  3733.8/475  Body mass index is 43.33 kg/m². GCS:   9  Mode: PVC+ Measured Rate: 18 Pressure Support: 24 PEEP: 8  General: Acutely and chronically ill male intubated and sedated  HEENT:  normocephalic and atraumatic. No scleral icterus. PERR  Neck: supple. No Thyromegaly. Lungs: Diminished breath sounds bilaterally, coarse breath sounds bilaterally. No retractions  Cardiac: Regular rate and regular rhythm, grade 2 murmur noted. No JVD. Abdomen: soft. Nontender. Abdominal bruising  Extremities:  No clubbing or cyanosis. +1 lower extremity edema bilaterally  Vasculature: capillary refill < 3 seconds. Palpable dorsalis pedis pulses. Skin:  warm and dry. Psych: Unable to assess secondary to patient being intubated and sedated  Lymph:  No supraclavicular adenopathy. Neurologic:  No focal deficit. No seizures. Data: (All radiographs, tracings, PFTs, and imaging are personally viewed and interpreted unless otherwise noted).  Sodium 143, potassium 5.5, chloride 106, CO2 18, BUN 19, creatinine 1.3, magnesium 2.1, calcium 8.7   Procalcitonin 0.38   CRP 25, , proBNP 745.5, troponin 0 0.028   Albumin 3.2, alk phos 61, ALT 43, AST 24, bilirubin 0.9, direct bilirubin 0.6, total protein 6.7   Beta hydroxybutyrate 22.95   WBC 11.3, hemoglobin 11.0, hematocrit 37.4, platelets 470   INR 1.17, APTT 30.2, d-dimer 1540.00   Telemetry shows normal sinus rhythm   Urinalysis demonstrated specific gravity greater than 1.030, 100 protein, greater than 1000 glucose, negative leukocytes, WBC 2-4, few budding yeast   Urine creatinine 155.6, osmolality 713, sodium 62   ABG pH 7.34/PCO2 56/ PO2 83/bicarb 30   Chest x-ray 9/23/20 demonstrated bilateral opacities          Meets Continued ICU Level Care Criteria:    [x] Yes   [] No - Transfer Planned to listed location:  [] HOSPITALIST CONTACTED-      Case and plan discussed with Dr. Molly Bruce. Electronically signed by Jayla Washington DO  CRITICAL CARE SPECIALIST  Patient seen by me. Case discussed with resident physician. Patient is critically ill on mechanical ventilator. Patient with COVID-19 associated ARDS. Initiate convalescent plasma. Continue with lung protection strategies. Requiring moderate levels of PEEP. Obtain lung lavage and sent for PCR. Continue to treat diabetes. Patient with poor compliance at home. Patient has shifting decision makers given his complexity. CC time 35 minutes.   Time does not include procedures or resident physician assessment. Time does include my direct assessment of the patient and coordination of care. Electronically signed by Ceclie Kruger MD.

## 2020-09-24 NOTE — PROGRESS NOTES
Patient's /emergency contact  Jillina Darling came and picked up his cell phone. He is going to attempt to find a number for the patient's parents and update him and discuss about convalescent plasma. The paster/emergency contact didn't want to give consent at this time.

## 2020-09-24 NOTE — SIGNIFICANT EVENT
Spoke with patient's decision-maker Mr. Lay Huynh at 9:59 AM, it was discussed that we were going to proceed with convalescent plasma therapy. Since that time patient decision maker Mr. Lay Huynh contacted nurse stating that he does not feel comfortable making  these this big decisions for the patient and was going to try contact patient's parents for further medical decision needed for patient. Nurses were notified at 1400, consent was given for convalescent plasma therapy. Plan will forward with convalescent plasma therapy at this time.

## 2020-09-24 NOTE — PROGRESS NOTES
Emanuel Minaya 58  OCCUPATIONAL THERAPY MISSED TREATMENT NOTE  [facility] CV[facility] 4B  4B-04/004-A      Date: 2020  Patient Name: Anthony Malin        CSN: [id number]   : 1970  (44 y.o.)  Gender: male                REASON FOR MISSED TREATMENT: Per PT note: Pt was just intubated last night and also on \"pressors\". RN reports that pt is not appropriate today for early mobility and recommended to check tomorrow.

## 2020-09-24 NOTE — PLAN OF CARE
Problem: Restraint Use - Nonviolent/Non-Self-Destructive Behavior:  Goal: Absence of restraint indications  Description: Absence of restraint indications  Outcome: Ongoing  Note: Despite sedation patient attempts to pull at ETT      Problem: Restraint Use - Nonviolent/Non-Self-Destructive Behavior:  Goal: Absence of restraint-related injury  Description: Absence of restraint-related injury  Outcome: Ongoing  Note: No signs of redness or injury at this time

## 2020-09-24 NOTE — PROGRESS NOTES
Assisted Radhika Encinas with intubation. Placed directly onto vent from BiPAP due to patient being COVID POSITIVE per protocol. Before intubation patients BiPAP setting were increased per Deyvi Kimball to 28/16 and 100%. Bilateral breath sounds and bilateral chest rise noted post intubation, CXR called to confirm tube placement. Patient has size 8.0 at the 26 cm secured with commercial tube elias (edvin). Placed patient on 33/14 Rate of 12 and 80% after intubation. Will continue weaning as patient tolerates. Debi Arboleda CNP wanted higher PEEP).

## 2020-09-24 NOTE — CARE COORDINATION
9/24/20, 12:19 PM EDT  DISCHARGE PLANNING EVALUATION:    Lary Lyle       Admitted from: ED 9/23/2020/ GENERAL REBECA [facility] day: 1   Location: [facility]04/004-A Reason for admit: Acute respiratory failure with hypoxemia ([address] [J96.01]  Acute respiratory failure with hypoxia ([address] [J96.01] Status: IP  Admit order signed?: yes  PMH:  has a past medical history of Arthritis, Atrial fibrillation ([address] BPH (benign prostatic hyperplasia), CAD (coronary artery disease), Carpal tunnel syndrome on right, Chronic gout, DM2 (diabetes mellitus, type 2) ([address] GERD (gastroesophageal reflux disease), Heart failure with preserved ejection fraction ([address] History of alcohol abuse, History of osteomyelitis, Hyperlipidemia, Hypertension, essential, Hypogonadism, male, Major depression, Mood disorder ([address] Morbid obesity ([address] Muscle weakness, DURAN (nonalcoholic steatohepatitis), Obstructive sleep apnea treated with continuous positive airway pressure (CPAP), KIARA (obstructive sleep apnea), and Vitamin D deficiency. Procedure:   9/23 CXR: Bilateral pulmonary opacification worse than on previous study dated 10 September 2020.  This may represent worsening inflammatory process, possibly Covid 19 infection; borderline cardiomegaly  9/23 Intubated  9/24 CVC left subclavian  9/24 CXR: Worsening bilateral pneumonia  Medications:  Scheduled Meds:   polyethylene glycol  17 g Oral Every Other Day    piperacillin-tazobactam  3.375 g Intravenous Q8H    insulin glargine  10 Units Subcutaneous QAM AC    sodium chloride  20 mL Intravenous Once    allopurinol  200 mg Oral Daily    [Held by provider] amLODIPine  5 mg Oral Daily    ARIPiprazole  15 mg Oral Daily    aspirin  81 mg Oral Daily    atorvastatin  40 mg Oral Nightly    busPIRone  10 mg Oral BID    citalopram  30 mg Oral Daily    Vitamin D  2,000 Units Oral Daily    dilTIAZem  60 mg Oral BID    folic acid  1 mg Oral Daily    [Held by provider] furosemide  40 mg Oral Daily    gabapentin  800 mg Oral BID    [Held by provider] hydrALAZINE  50 mg Oral BID    enoxaparin  1 mg/kg Subcutaneous Q12H    [Held by provider] tamsulosin  0.4 mg Oral Nightly    sodium chloride flush  10 mL Intravenous 2 times per day    chlorhexidine  15 mL Mouth/Throat BID    famotidine (PEPCID) injection  20 mg Intravenous BID    insulin glargine  10 Units Subcutaneous Nightly    glycopyrrolate-formoterol  2 puff Inhalation BID    dexamethasone  4 mg Intravenous Daily    therapeutic multivitamin-minerals  1 tablet Oral Daily    senna  1 tablet Oral BID     Continuous Infusions:   norepinephrine 12 mcg/min (09/24/20 0348)    insulin 13.7 Units/hr (09/24/20 1113)    sodium chloride 100 mL/hr at 09/24/20 1041    propofol 30 mcg/kg/min (09/24/20 1118)    dextrose        Pertinent Info/Orders/Treatment Plan: Presented with c/o progressively worsening SOB over 2 days. Unsure if he had been taking all his meds since discharge from hospital. Reports feeling fatigued. Had tested +COVID 2 weeks PTA. Did not have home O2 on when EMS arrived, placed on 6L O2 by EMS. Rapid Abbott COVID was neg. COVID PCR sent. Placed on bipap and admitted to . Had increased WOB, tachypneic,  and decreased responsiveness on bipap and was intubated last night. Plan for convalescent plasma. Remains on vent w/ETT on PCV, peep 6, FIO2 40%, sats 98%. Tmax 99.8. NSR. Follows commands. Cardiac monitoring, I&O, daily weight, oral care, OG to [facility]. IVF, insulin drip, levo @ 12 mcg/min, diprivan @ 30 mcg/kg/min, allopurinol, abilify, asa, lipitor, buspar, celexa, IV decadron 4 mg daily, cardizem, lovenox bid, pepcid, folic acid, neurontin, inhaler, lantus, IV zosyn, multivitamin, vit D. Received 2L in fld bolus, IV albumin x1.  Na+ 146 - now 143, K+ 4.8 - now 5.5, CO2 18, Creat 1.4 - now 1.3, AG 16 - now 19, procal 0.64 - now 0.38, CRP 25.1, , trop 0.028, alb 3.2, direct bili 0.6, beta-hydroxybutyrate 22.95, wbc 11.3, hgb 11.3 - now 11, INR 1.17, d-dimer 1540. Rapid flu was neg. Diet: DIET TUBE FEED CONTINUOUS/CYCLIC NPO; Semi-elemental (Vital 1.2); Continuous; 10; 20   Smoking status:  reports that he has never smoked. He has never used smokeless tobacco.   PCP: Ally Moreno MD  Readmission 30 days or less: yes  Readmission Risk Score: 43%    Discharge Planning Evaluation  Current Residence:     Living Arrangements:      Support Systems:     Current Services PTA:     Potential Assistance Needed:     Potential Assistance Purchasing Medications:     Does patient want to participate in local refill/ meds to beds program?     Type of Home Care Services:     Patient expects to be discharged to:     Expected Discharge date: Follow Up Appointment: Best Day/ Time:      Patient Goals/Plan/Treatment Preferences: From home alone and current with [facility]. Called and spoke with Qasim Beasley [facility], at [phone] and she states they were current with RN/Aide/PT/OT/SW. Luke Forward reports the patient did not get his meds at discharge (see SW note), didn't get lovenox and other meds until Monday - patient had been discharged on the previous Wednesday. [facility] Plan pending clinical course. Will need PT/OT when appropriate. Transportation/Food Security/Housekeeping Addressed:  No issues identified.     Evaluation: yes

## 2020-09-24 NOTE — SIGNIFICANT EVENT
I spoke with patient's medical decision-maker  Mr. Anny Rodriguez regarding wanting to initiate convalescent plasma therapy. It was explained that there has been no proven literature to support convalescent plasma in curing COVID 19 infections, however it has been shown to improve outcomes. The risks of convalescent plasma therapy were explained including but are not limited to transfusion-like reaction, anaphylactic reaction, and worsening pulmonary edema. The risks and benefits were expressed and . Benny Manzo wants to go forward and would like us to give patient  convalescent plasma in hopes to improve overall outcome of his COVID infection.            Mr. Anny Rodriguez: [phone]

## 2020-09-24 NOTE — FLOWSHEET NOTE
Notified Mar Falling CNP of blood pressure and currently on 12 mcg of levo running in a PIV, I&O completed.  Stated understanding

## 2020-09-25 LAB
ALBUMIN SERPL-MCNC: 3.2 G/DL (ref 3.5–5.1)
ALP BLD-CCNC: 60 U/L (ref 38–126)
ALT SERPL-CCNC: 46 U/L (ref 11–66)
ANION GAP SERPL CALCULATED.3IONS-SCNC: 10 MEQ/L (ref 8–16)
AST SERPL-CCNC: 28 U/L (ref 5–40)
BASOPHILS # BLD: 0.1 %
BASOPHILS ABSOLUTE: 0 THOU/MM3 (ref 0–0.1)
BILIRUB SERPL-MCNC: 0.5 MG/DL (ref 0.3–1.2)
BUN BLDV-MCNC: 15 MG/DL (ref 7–22)
CALCIUM SERPL-MCNC: 8.7 MG/DL (ref 8.5–10.5)
CHLORIDE BLD-SCNC: 111 MEQ/L (ref 98–111)
CO2: 25 MEQ/L (ref 23–33)
CREAT SERPL-MCNC: 1.1 MG/DL (ref 0.4–1.2)
EOSINOPHIL # BLD: 0 %
EOSINOPHILS ABSOLUTE: 0 THOU/MM3 (ref 0–0.4)
ERYTHROCYTE [DISTWIDTH] IN BLOOD BY AUTOMATED COUNT: 18 % (ref 11.5–14.5)
ERYTHROCYTE [DISTWIDTH] IN BLOOD BY AUTOMATED COUNT: 63 FL (ref 35–45)
GFR SERPL CREATININE-BSD FRML MDRD: 85 ML/MIN/1.73M2
GLUCOSE BLD-MCNC: 126 MG/DL (ref 70–108)
GLUCOSE BLD-MCNC: 134 MG/DL (ref 70–108)
GLUCOSE BLD-MCNC: 134 MG/DL (ref 70–108)
GLUCOSE BLD-MCNC: 135 MG/DL (ref 70–108)
GLUCOSE BLD-MCNC: 137 MG/DL (ref 70–108)
GLUCOSE BLD-MCNC: 146 MG/DL (ref 70–108)
GLUCOSE BLD-MCNC: 147 MG/DL (ref 70–108)
GLUCOSE BLD-MCNC: 148 MG/DL (ref 70–108)
GLUCOSE BLD-MCNC: 152 MG/DL (ref 70–108)
GLUCOSE BLD-MCNC: 219 MG/DL (ref 70–108)
GLUCOSE BLD-MCNC: 234 MG/DL (ref 70–108)
HCT VFR BLD CALC: 30.9 % (ref 42–52)
HEMOGLOBIN: 9.4 GM/DL (ref 14–18)
IMMATURE GRANS (ABS): 0.04 THOU/MM3 (ref 0–0.07)
IMMATURE GRANULOCYTES: 0.6 %
LACTIC ACID: 0.9 MMOL/L (ref 0.5–2.2)
LYMPHOCYTES # BLD: 8.1 %
LYMPHOCYTES ABSOLUTE: 0.6 THOU/MM3 (ref 1–4.8)
MCH RBC QN AUTO: 29 PG (ref 26–33)
MCHC RBC AUTO-ENTMCNC: 30.4 GM/DL (ref 32.2–35.5)
MCV RBC AUTO: 95.4 FL (ref 80–94)
MONOCYTES # BLD: 9.5 %
MONOCYTES ABSOLUTE: 0.6 THOU/MM3 (ref 0.4–1.3)
NUCLEATED RED BLOOD CELLS: 0 /100 WBC
PLATELET # BLD: 221 THOU/MM3 (ref 130–400)
PMV BLD AUTO: 11 FL (ref 9.4–12.4)
POTASSIUM SERPL-SCNC: 4.2 MEQ/L (ref 3.5–5.2)
PROCALCITONIN: 0.22 NG/ML (ref 0.01–0.09)
RBC # BLD: 3.24 MILL/MM3 (ref 4.7–6.1)
SEG NEUTROPHILS: 81.7 %
SEGMENTED NEUTROPHILS ABSOLUTE COUNT: 5.6 THOU/MM3 (ref 1.8–7.7)
SODIUM BLD-SCNC: 146 MEQ/L (ref 135–145)
TOTAL PROTEIN: 6.1 G/DL (ref 6.1–8)
WBC # BLD: 6.8 THOU/MM3 (ref 4.8–10.8)

## 2020-09-25 PROCEDURE — 6360000002 HC RX W HCPCS: Performed by: HOSPITALIST

## 2020-09-25 PROCEDURE — 84145 PROCALCITONIN (PCT): CPT

## 2020-09-25 PROCEDURE — 2500000003 HC RX 250 WO HCPCS: Performed by: NURSE PRACTITIONER

## 2020-09-25 PROCEDURE — 6370000000 HC RX 637 (ALT 250 FOR IP): Performed by: STUDENT IN AN ORGANIZED HEALTH CARE EDUCATION/TRAINING PROGRAM

## 2020-09-25 PROCEDURE — 36415 COLL VENOUS BLD VENIPUNCTURE: CPT

## 2020-09-25 PROCEDURE — 94640 AIRWAY INHALATION TREATMENT: CPT

## 2020-09-25 PROCEDURE — 85025 COMPLETE CBC W/AUTO DIFF WBC: CPT

## 2020-09-25 PROCEDURE — 83605 ASSAY OF LACTIC ACID: CPT

## 2020-09-25 PROCEDURE — 6370000000 HC RX 637 (ALT 250 FOR IP): Performed by: HOSPITALIST

## 2020-09-25 PROCEDURE — 99291 CRITICAL CARE FIRST HOUR: CPT | Performed by: INTERNAL MEDICINE

## 2020-09-25 PROCEDURE — 2100000000 HC CCU R&B

## 2020-09-25 PROCEDURE — 94003 VENT MGMT INPAT SUBQ DAY: CPT

## 2020-09-25 PROCEDURE — 6360000002 HC RX W HCPCS: Performed by: NURSE PRACTITIONER

## 2020-09-25 PROCEDURE — 2580000003 HC RX 258: Performed by: NURSE PRACTITIONER

## 2020-09-25 PROCEDURE — 2580000003 HC RX 258: Performed by: HOSPITALIST

## 2020-09-25 PROCEDURE — 80053 COMPREHEN METABOLIC PANEL: CPT

## 2020-09-25 PROCEDURE — 6370000000 HC RX 637 (ALT 250 FOR IP): Performed by: NURSE PRACTITIONER

## 2020-09-25 PROCEDURE — 82948 REAGENT STRIP/BLOOD GLUCOSE: CPT

## 2020-09-25 PROCEDURE — 94761 N-INVAS EAR/PLS OXIMETRY MLT: CPT

## 2020-09-25 RX ORDER — INSULIN GLARGINE 100 [IU]/ML
100 INJECTION, SOLUTION SUBCUTANEOUS 2 TIMES DAILY
Status: DISCONTINUED | OUTPATIENT
Start: 2020-09-25 | End: 2020-09-25

## 2020-09-25 RX ORDER — INSULIN GLARGINE 100 [IU]/ML
100 INJECTION, SOLUTION SUBCUTANEOUS 2 TIMES DAILY
Status: DISCONTINUED | OUTPATIENT
Start: 2020-09-25 | End: 2020-09-29

## 2020-09-25 RX ADMIN — PROPOFOL 30 MCG/KG/MIN: 10 INJECTION, EMULSION INTRAVENOUS at 19:25

## 2020-09-25 RX ADMIN — FOLIC ACID 1 MG: 1 TABLET ORAL at 08:12

## 2020-09-25 RX ADMIN — MULTIPLE VITAMINS W/ MINERALS TAB 1 TABLET: TAB at 08:12

## 2020-09-25 RX ADMIN — INSULIN GLARGINE 100 UNITS: 100 INJECTION, SOLUTION SUBCUTANEOUS at 11:10

## 2020-09-25 RX ADMIN — PROPOFOL 30 MCG/KG/MIN: 10 INJECTION, EMULSION INTRAVENOUS at 13:54

## 2020-09-25 RX ADMIN — Medication 15 ML: at 21:25

## 2020-09-25 RX ADMIN — ARIPIPRAZOLE 15 MG: 15 TABLET ORAL at 08:14

## 2020-09-25 RX ADMIN — SENNOSIDES 8.6 MG: 8.6 TABLET, FILM COATED ORAL at 21:42

## 2020-09-25 RX ADMIN — PROPOFOL 30 MCG/KG/MIN: 10 INJECTION, EMULSION INTRAVENOUS at 00:02

## 2020-09-25 RX ADMIN — INSULIN LISPRO 3 UNITS: 100 INJECTION, SOLUTION INTRAVENOUS; SUBCUTANEOUS at 11:35

## 2020-09-25 RX ADMIN — FUROSEMIDE 40 MG: 40 TABLET ORAL at 10:00

## 2020-09-25 RX ADMIN — Medication 2000 UNITS: at 08:11

## 2020-09-25 RX ADMIN — DEXAMETHASONE SODIUM PHOSPHATE 4 MG: 4 INJECTION, SOLUTION INTRAMUSCULAR; INTRAVENOUS at 08:14

## 2020-09-25 RX ADMIN — PROPOFOL 30 MCG/KG/MIN: 10 INJECTION, EMULSION INTRAVENOUS at 09:56

## 2020-09-25 RX ADMIN — CITALOPRAM 30 MG: 20 TABLET, FILM COATED ORAL at 08:12

## 2020-09-25 RX ADMIN — BUSPIRONE HYDROCHLORIDE 10 MG: 10 TABLET ORAL at 21:25

## 2020-09-25 RX ADMIN — ALLOPURINOL 200 MG: 100 TABLET ORAL at 08:14

## 2020-09-25 RX ADMIN — SODIUM CHLORIDE: 9 INJECTION, SOLUTION INTRAVENOUS at 06:51

## 2020-09-25 RX ADMIN — Medication 15 ML: at 08:36

## 2020-09-25 RX ADMIN — PIPERACILLIN AND TAZOBACTAM 3.38 G: 3; .375 INJECTION, POWDER, LYOPHILIZED, FOR SOLUTION INTRAVENOUS at 11:11

## 2020-09-25 RX ADMIN — SODIUM CHLORIDE 10.4 UNITS/HR: 9 INJECTION, SOLUTION INTRAVENOUS at 04:55

## 2020-09-25 RX ADMIN — SODIUM CHLORIDE, PRESERVATIVE FREE 10 ML: 5 INJECTION INTRAVENOUS at 08:26

## 2020-09-25 RX ADMIN — ASPIRIN 81 MG: 81 TABLET, CHEWABLE ORAL at 08:36

## 2020-09-25 RX ADMIN — FAMOTIDINE 20 MG: 10 INJECTION INTRAVENOUS at 08:36

## 2020-09-25 RX ADMIN — PIPERACILLIN AND TAZOBACTAM 3.38 G: 3; .375 INJECTION, POWDER, LYOPHILIZED, FOR SOLUTION INTRAVENOUS at 03:47

## 2020-09-25 RX ADMIN — INSULIN LISPRO 6 UNITS: 100 INJECTION, SOLUTION INTRAVENOUS; SUBCUTANEOUS at 15:28

## 2020-09-25 RX ADMIN — ENOXAPARIN SODIUM 130 MG: 150 INJECTION SUBCUTANEOUS at 17:44

## 2020-09-25 RX ADMIN — BUSPIRONE HYDROCHLORIDE 10 MG: 10 TABLET ORAL at 08:14

## 2020-09-25 RX ADMIN — PROPOFOL 30 MCG/KG/MIN: 10 INJECTION, EMULSION INTRAVENOUS at 05:07

## 2020-09-25 RX ADMIN — GLYCOPYRROLATE AND FORMOTEROL FUMARATE 2 PUFF: 9; 4.8 AEROSOL, METERED RESPIRATORY (INHALATION) at 09:17

## 2020-09-25 RX ADMIN — GLYCOPYRROLATE AND FORMOTEROL FUMARATE 2 PUFF: 9; 4.8 AEROSOL, METERED RESPIRATORY (INHALATION) at 20:36

## 2020-09-25 RX ADMIN — GABAPENTIN 800 MG: 400 CAPSULE ORAL at 08:12

## 2020-09-25 RX ADMIN — FAMOTIDINE 20 MG: 10 INJECTION INTRAVENOUS at 21:25

## 2020-09-25 RX ADMIN — INSULIN LISPRO 6 UNITS: 100 INJECTION, SOLUTION INTRAVENOUS; SUBCUTANEOUS at 19:32

## 2020-09-25 RX ADMIN — ENOXAPARIN SODIUM 130 MG: 150 INJECTION SUBCUTANEOUS at 05:07

## 2020-09-25 RX ADMIN — INSULIN GLARGINE 100 UNITS: 100 INJECTION, SOLUTION SUBCUTANEOUS at 21:25

## 2020-09-25 RX ADMIN — PIPERACILLIN AND TAZOBACTAM 3.38 G: 3; .375 INJECTION, POWDER, LYOPHILIZED, FOR SOLUTION INTRAVENOUS at 17:44

## 2020-09-25 RX ADMIN — ATORVASTATIN CALCIUM 40 MG: 40 TABLET, FILM COATED ORAL at 21:25

## 2020-09-25 ASSESSMENT — PULMONARY FUNCTION TESTS
PIF_VALUE: 28
PIF_VALUE: 24
PIF_VALUE: 25
PIF_VALUE: 24

## 2020-09-25 ASSESSMENT — PAIN SCALES - GENERAL
PAINLEVEL_OUTOF10: 0
PAINLEVEL_OUTOF10: 0

## 2020-09-25 NOTE — FLOWSHEET NOTE
1800 Mr Torsten Villagomez continues to rest without distress. He appears comfortable, denies pain and is able to follow simple commands.

## 2020-09-25 NOTE — PROGRESS NOTES
CRITICAL CARE PROGRESS NOTE      Patient:  Leah Celeste    Unit/Bed:4B-04/004-A  YOB: 1970  MRN: [id number]   PCP: Naya Jackman MD  Date of Admission: 9/23/2020  Chief Complaint:- COVID 19 Infection     Assessment and Plan:    1. Acute on chronic hypoxic respiratory failure: Due to COVID. Patient was recently hospitalized for COVID-19 infection. It was noted per EHR patient was not wearing 6 L nasal cannula as directed. Patient became hypoxic and respiratory distress 9/23/2020, resulting in intubation. Patient remains on ventilator PCV+, measured rate 22, PEEP 6, pressure support 18  2. Septic/hypovolemic shock: Due to severe pneumonia and volume depletion. Patient requiring Levophed to maintain MAP > 65. Patient currently receiving 0.9% normal saline at 100 mL/ hr  patient received 25g albumin. Patient appears to be intravascularly depleted. However  restart Lasix 40 mg daily. We want to keep patient on the dry side so hyaluronic gel does not accumulate within alveoli. In the setting of COVID-19 infection/ARDS fluid will have an increase tendency to accumulate and reside in alveoli, with release of hyaluronic acid a hyaluronic gel can form further worsening alveoli surface area. 3. Severe pneumonia: Due to COVID-19 infection vs superimposed bacterial pneumonia. Chest x-ray demonstrating worsening bilateral pneumonia. Continue Zosyn. Procalcitonin trending downwards 0.22, procalcitonin continues to decrease greater than 50% over 48 hours indicating antibiotic therapy is effective. Respiratory PCR negative. Current hospitalization patient testing negative for COVID-19. We will continue to treat for COVID-19, patient was previously treated with danazol, however due to patient being intubated and sedated danazol is unlikely to be given as it cannot be crushed. Patient receiving 2 units of convalescent plasma therapy.   Continue Decadron  4 mg daily for total of 4 doses ( day 2/4). Decadron is only therapy noted to decrease mortality risk and severe COVID infections. Continue aspirin 81 mg daily for treatment and prevention microthrombosis which is noted to be common in COVID/ARDS conditions. 4. ARDS:  Secondary to complications of WEPYV-14. Progressive bilateral infiltrates. Continue with mechanical ventilator support. Continue with lung protection strategies and permissive hypercapnia. Patient is too large for pronation therapy. .    5. Uncontrolled type 2 diabetes: Noncompliant, last A1c 9.9%. beta hydroxybutyrate 22.95. Patient's blood sugars seem to be significantly improving. Patient on tube feeds will initiate 200 mL free water flushes 3 times daily. Stop insulin drip and will initiate lantus 100 units BID as well as high-dose sliding scale four times a day. Continue gabapentin 800 mg twice daily. 6. History of BPH with associated urinary retention: Patient had about 1 L of urine retention and Dimas catheter had been inserted. Patient takes Flomax at home, unable to continue use of Flomax since it cannot be crushed. We will continue to follow culture  7. Chronic diastolic heart failure: Echocardiogram on 10/10/2019 demonstrated EF 55 to 60%. Patient previously reported increased lower extremity edema prior admission 1 week ago. proBNP negative. Patient weight increased 4 pounds since last admission. Patient currently has bilateral lower extremity edema however intravascularly appears dry. Hold Lasix. Strict I's and O's and daily weights. Echo 9/24/20 was significant for mild to moderate aortic stenosis with an ejection fraction 60%. Resume Lasix 40 mg daily  2. History of paroxysmal atrial fibrillation: Rate controlled - Cardizem 60mg BID.  Patient was previously on Eliquis however due to weight being greater than 120 kg. Lovenox 120 mg subcutaneous BID is indicated as treatment of choice for anticoagulation;    8. Morbid obesity: BMI 43.33.   Will educate patient on importance of weight loss when appropriate  9. Obstructive sleep apnea: We will encourage patient to wear home CPAP after extubated  10. Vitamin D deficiency: Continue vitamin D supplementation  11. History of hyperlipidemia: Continue Lipitor 40 mg nightly  12. History of gout: Continue allopurinol  13. History of essential hypertension:   Hold Norvasc and hydralazine as patient is currently intubated and requiring pressor therapy  14. History of anxiety/depression: Continue BuSpar, Celexa and Abilify        INITIAL H AND P AND ICU COURSE:  47-year-old male presented to the [facility] emergency department complaining of progressive worsening shortness of breath for the last 2 days. Patient denies chest pain, fever, chills, diarrhea, urinary symptoms. Patient admits to bilateral lower extremity committee swelling. Patient admits that he is unsure if he has been taking his medication as directed from prior discharge.     Patient has a significant past medical history for obstructive sleep apnea which uses CPAP machine, obesity, anxiety, depression, hypertension, hyperlipidemia, rheumatoid arthritis, type 2 diabetes and atrial fibrillation. Patient was diagnosed with COVID19  pneumonia and admitted to [facility] last month. Carly Graham was treated at that time with Decadron, Remdesivir and azithromycin patient was discharged on 8/12/2020.  9/6/2020 patient was brought back to the emergency room department via EMS because he was found to be hypoxic at [facility].  Patient was treated with Decadron 6 mg for 3 days and discharged 9/15/20 to home apartment with home health. Prior to discharge on 9/15/2020 home oxygen evaluation was performed prior to discharge on 9/15/2020. Patient was requiring 6 L of oxygen with activity however did not require oxygen at rest.     On arrival to [facility] patient states he is having increasing shortness of breath.  However denies chest pain, nausea, vomiting, diarrhea, fatigue, fever, chills.        Past Medical History: Vitamin D deficiency, obstructive sleep apnea uses CPAP at night, nonalcoholic steatohepatitis, morbid obesity, major depression, hypogonadism, essential hypertension, hyperlipidemia, history of osteomyelitis, history of alcohol abuse, heart failure with preserved ejection fraction, GERD, type 2 diabetes, chronic gout, CAD, BPH, atrial fibrillation, rheumatoid arthritis  Family History: Mother-heart disease with history of MI at 48years of age, .  Paternal aunt -lung cancer  Social History: History of alcohol abuse, lifetime non-smoker, denies substance abuse, , resides at [facility]   Unable to obtain secondary to patient intubated and sedated      Scheduled Meds:   polyethylene glycol  17 g Oral Every Other Day    piperacillin-tazobactam  3.375 g Intravenous Q8H    allopurinol  200 mg Oral Daily    [Held by provider] amLODIPine  5 mg Oral Daily    ARIPiprazole  15 mg Oral Daily    aspirin  81 mg Oral Daily    atorvastatin  40 mg Oral Nightly    busPIRone  10 mg Oral BID    citalopram  30 mg Oral Daily    Vitamin D  2,000 Units Oral Daily    dilTIAZem  60 mg Oral BID    folic acid  1 mg Oral Daily    furosemide  40 mg Oral Daily    gabapentin  800 mg Oral BID    [Held by provider] hydrALAZINE  50 mg Oral BID    enoxaparin  1 mg/kg Subcutaneous Q12H    [Held by provider] tamsulosin  0.4 mg Oral Nightly    sodium chloride flush  10 mL Intravenous 2 times per day    chlorhexidine  15 mL Mouth/Throat BID    famotidine (PEPCID) injection  20 mg Intravenous BID    glycopyrrolate-formoterol  2 puff Inhalation BID    dexamethasone  4 mg Intravenous Daily    therapeutic multivitamin-minerals  1 tablet Oral Daily    senna  1 tablet Oral BID     Continuous Infusions:   norepinephrine Stopped (20 0002)    sodium chloride 100 mL/hr at 20 0651    propofol 30 mcg/kg/min (09/25/20 0507)    dextrose         PHYSICAL EXAMINATION:  T: 96.4.  P: 52. RR: 18. B/P: 94/67. FiO2: 40%. O2 Sat: 95%. I/O:  1595.3/1150  Body mass index is 43.33 kg/m². GCS:   10  Mode: PVC+ Measured Rate: 18 Pressure Support: 24 PEEP: 6  General:   Acute on chronically ill male that is currently critically ill on mechanical ventilation. HEENT:  normocephalic and atraumatic. No scleral icterus. PERR  Neck: supple. No Thyromegaly. Lungs: Diminished lung sounds bilaterally with coarse rales noted on auscultation. No retractions   Cardiac: Distant heart sounds noted clear S1 and S2.  2/6 Murmur noted. No JVD  Abdomen: soft. Nontender. Positive bowel sounds. Lower Abdominal bruising noted  Extremities:  No clubbing or cyanosis. +2 lower extermity edema bialterally   Vasculature: capillary refill < 3 seconds. Faint dorsalis pedis pulses. Skin:  warm and dry. Psych: Unable to assess secondary to patient being intubated and sedated  Lymph:  No supraclavicular adenopathy. Neurologic:  No focal deficit. No seizures. Data: (All radiographs, tracings, PFTs, and imaging are personally viewed and interpreted unless otherwise noted).  Sodium 146, potassium 4.2, chloride 111, CO2 25, BUN 15, creatinine 1.1, calcium 8.7   Albumin 3.2, alk phos 60, ALT 46, AST 28, bilirubin 0.5, total protein 6.1   WBC 6.8, hemoglobin 9.4, hematocrit 30.9, platelets 872   Procalcitonin 0.22   Lactic acid 0.9    Telemetry shows   ABG pH 7.34/PCO2 56/ PO2 83/bicarb 30   Chest x-ray 9/24/20 reports worsening bilateral pneumonia    Chest x-ray 9/23/20 demonstrated bilateral pulmonary opacifications worse than on previous study on September 10, 2020. Possible worsening inflammatory process, possible COVID-19 infection, borderline cardiomegaly opacities    Echocardiogram 9/24/2020 reports: Ejection fraction is visually estimated at 60%.    Overall left ventricular function is normal. The aortic valve leaflets were not well visualized.  Aortic valve appears tricuspid. Aortic valve leaflets are somewhat thickened. Aortic valve leaflets are Mildly calcified. The maximum aortic valve gradient is 30 mmHg, the mean gradient is 15 mmHg, and the peak velocity is 275 cm/s. There is mild-to-moderate aortic stenosis with valve area of 1.5 sq cm. Meets Continued ICU Level Care Criteria:    [x] Yes   [] No - Transfer Planned to listed location:  [] HOSPITALIST CONTACTED-      Case and plan discussed with Dr. Olamide Solomon. Patient seen by me. Case discussed with resident physician. Patient is critically ill on mechanical ventilator. Patient has progressed to ARDS. PCR airway shows no identified bacterial infection or superinfection. Etiology is complication of QSKBN-[id number]. Continue to diurese. CC time 30 minutes. Time does not include resident physician assessment. Time does include my direct assessment and coordination of care. Electronically signed by Andrew Solomon MD.      Electronically signed by Karol Nguyen DO  CRITICAL CARE SPECIALIST

## 2020-09-25 NOTE — CARE COORDINATION
9/25/20, 3:18 PM EDT    DISCHARGE ON GOING EVALUATION    Michelle Lowery day: 2  Location: -04/004-A Reason for admit: Acute respiratory failure with hypoxemia ([facility] [J96.01]  Acute respiratory failure with hypoxia ([facility] [address] [J96.01]   Procedure:   9/23 CXR: Bilateral pulmonary opacification worse than on previous study dated 10 September 2020. This may represent worsening inflammatory process, possibly Covid 19 infection; borderline cardiomegaly  9/23 Intubated  9/24 CVC left subclavian  Treatment Plan of Care: Levo off overnight. Remains on vent w/ETT on PCV, peep 6, FIO2 40%, sats 95%. Afebrile. NSR. Follows commands. Cardiac monitoring, I&O, daily weight, oral care, OG w/TF, msaon care. IVF, diprivan @ 30 mcg/kg/min, allopurinol, abilify, asa, lipitor, buspar, celexa, IV decadron 4 mg daily, cardizem, lovenox bid, pepcid, folic acid, po lasix daily, neurontin, inhaler, lantus, SSI Q4H, IV zosyn, multivitamin, vit D. Na+ 146, alb 3.2, hgb 9.4. Barriers to Discharge: on vent  PCP: Millicent Orantes MD  Readmission Risk Score: 41%  Patient Goals/Plan/Treatment Preferences: From home alone and current \[facility] [place] [facility]\"" - [facility] for RN/PT/OT. SW on case.

## 2020-09-25 NOTE — PLAN OF CARE
Problem: Falls - Risk of:  Goal: Will remain free from falls  Description: Will remain free from falls  Outcome: Ongoing  Note: Bedrest today. Problem: Skin Integrity:  Goal: Will show no infection signs and symptoms  Description: Will show no infection signs and symptoms  Outcome: Ongoing  Note: Frequent position changes. He tolerates this well. Problem: Discharge Planning:  Goal: Discharged to appropriate level of care  Description: Discharged to appropriate level of care  Outcome: Ongoing  Note: Plan discharge home after recovery. Problem: Pain:  Goal: Pain level will decrease  Description: Pain level will decrease  Outcome: Ongoing  Note: He denies pain or SOB. Problem: Urinary Retention:  Goal: Urinary elimination within specified parameters  Description: Urinary elimination within specified parameters  Outcome: Ongoing  Note: Dimas in place. He has adequate urine output. Problem: Nutrition  Goal: Optimal nutrition therapy  9/25/2020 1610 by Jyoti Gongora RN  Outcome: Ongoing  Note: He tolerates tube feeding well with min residuals. Currently at goal rate of 30 cc/hr. Problem: Restraint Use - Nonviolent/Non-Self-Destructive Behavior:  Goal: Absence of restraint indications  Description: Absence of restraint indications  Outcome: Completed     Problem: Restraint Use - Nonviolent/Non-Self-Destructive Behavior:  Goal: Absence of restraint-related injury  Description: Absence of restraint-related injury  Outcome: Completed   Care plan reviewed with patient and family. Patient and family verbalize understanding of the plan of care and contribute to goal setting.

## 2020-09-25 NOTE — PLAN OF CARE
Problem: Nutrition  Goal: Optimal nutrition therapy  Outcome: Ongoing   Nutrition Problem #1: Inadequate oral intake  Intervention: Food and/or Nutrient Delivery: Continue NPO, Modify Tube Feeding, Vitamin Supplement  Nutritional Goals: Patient will tolerate EN at 10-20 ml/hr during acute phase

## 2020-09-25 NOTE — PROGRESS NOTES
Comprehensive Nutrition Assessment    Type and Reason for Visit:  Reassess(TF management)    Nutrition Recommendations/Plan:   Increase Vital 1.2 to 20ml/hr  Bolus 1 2.5ounce liquid protein bottle TID  Free water flushes per MD  Monitoring diprivan, labs,  tolerance for EN  Adjustments as appropriate. Nutrition Assessment:     Pt. nutritionally compromised AEB NPO, intubated. At risk for further nutrition compromise r/t admitted with acute respiratory failure with hypoxemia, obesity, elevated Hgb A1C of 9.9, and underlying medical condition (hx: CAD, DM, GERD, Alcohol abuse, HLP, HTN, Vitamin D deficiency). Nutrition recommendations/interventions as per above. Malnutrition Assessment:  Malnutrition Status:  Insufficient data(+covid)    Context:  Acute Illness     Findings of the 6 clinical characteristics of malnutrition:  Energy Intake:  No significant decrease in energy intake(great appetite noted 9/14/20. NPO x 1 day)  Weight Loss:  (-14# in 7 weeks, note current weight is stated so not reliable)     Body Fat Loss:  Unable to assess(+covid patient)     Muscle Mass Loss:  Unable to assess(+covid)    Fluid Accumulation:  1 - Mild Extremities   Strength:  Not Performed    Estimated Daily Nutrient Needs:  Energy (kcal):  4971-5422 (8-11 kcals/kg for acute phase);  Weight Used for Energy Requirements:  Current(129 kg  - stated)     Protein (g):  ~175 (2.5 grams/kg ideal weight); Weight Used for Protein Requirements:  Ideal(70 kg)        Fluid (ml/day):  per MD;     Nutrition Related Findings:  covid; intubated 9/23; sedated; tolerating TF well this am; abd. soft; BM 0; insulin gtt off this am; meds include decadron, folic acid, lasix, lantus, humalog, ATB, senna, MVI, vitamin D, diprivan; glucose 146  BUN 15  creatinine 1.1  MAP 69; received 72% of Rx TF volume past 24h; recent hospitalization and was eating well with between meal snacks then     Wounds:  Stage III(stage 3 pressure ulcer on hip) EDT    Contact: [phone]

## 2020-09-25 NOTE — PROGRESS NOTES
Emanuel Minaya 58  PHYSICAL THERAPY MISSED TREATMENT NOTE  [facility]    Date: 2020  Patient Name: Jamilah Clinton        MRN: [id number]   : 1970  (44 y.o.)  Gender: male                REASON FOR MISSED TREATMENT:  Hold treatment per nursing request.  Pt continues to be intubated and sedated. RN reports not a candidate for early mobility yet. Will check next therapy date. Mal Thorne.  Clay Bartlett, [place] 8

## 2020-09-25 NOTE — PROGRESS NOTES
spoke with pt's nurse about pt's ability to complete AD forms. Pt is currently on a vent and unable to complete them at this time.

## 2020-09-25 NOTE — PROGRESS NOTES
Physician Progress Note      PATIENT:               Royal Moreau  CSN #:                  [id number]  :                       1970  ADMIT DATE:       2020 9:02 AM  DISCH DATE:  RESPONDING  PROVIDER #:        Jean Robb DO          QUERY TEXT:    Dr Woody Mccracken, Dr Selina Mclaughlin,    [address] admitted with PNA & COVID 19. Pt noted to have Septic/Hypovolemic Shock   Documented by IM in PN  . If possible, please document in progress notes   and discharge summary if Sepsis was present on admission (POA): The medical record reflects the following:  Risk Factors: COVID 19, chronic res.  failure  Clinical Indicators: PNA, COVID 19, acute res failure, shock  Treatment: ICU admission, IV Levophed, IV ATB  Options provided:  -- Yes, Sepsis was present at the time of the order to admit to the hospital  -- No, Sepsis was not present on admission and developed during the inpatient   stay  -- Other - I will add my own diagnosis  -- Disagree - Not applicable / Not valid  -- Disagree - Clinically unable to determine / Unknown  -- Refer to Clinical Documentation Reviewer    PROVIDER RESPONSE TEXT:    Yes, Sepsis was present at the time of the order to admit to the hospital.    Query created by: Sandra Gonzalez on 2020 10:01 AM      Electronically signed by:  Jean Robb DO 2020 10:11 AM

## 2020-09-25 NOTE — PLAN OF CARE
Problem: MECHANICAL VENTILATION  Goal: Normal spontaneous ventilation  Outcome: Ongoing  Note: Pt not able to wean off vent at this time  Will continue with vent settings and monitor weaning readiness

## 2020-09-25 NOTE — FLOWSHEET NOTE
0800 Mr Katheryn Adame rests in the bed without apparent distress. He appears calm and sedate while intubated. His restraints were removed. He requires Diprivan at 30 ug and has an insulin gtt infusing per gluco stabilizer. He is able to answer simple questions and follow simple commands with verbal stimuli. He has a mason patent to gravity. Will plan to stop insulin gtt and try to diurese today. 0900 Dr Sylvania Cheadle has come to see. 1000 His dad has called and been given an update and discussed plan of care.

## 2020-09-25 NOTE — PLAN OF CARE
Problem: Restraint Use - Nonviolent/Non-Self-Destructive Behavior:  Goal: Absence of restraint indications  Description: Absence of restraint indications  Outcome: Completed  Goal: Absence of restraint-related injury  Description: Absence of restraint-related injury  Outcome: Completed   Stopped today

## 2020-09-26 ENCOUNTER — APPOINTMENT (OUTPATIENT)
Dept: GENERAL RADIOLOGY | Age: 50
DRG: 870 | End: 2020-09-26
Payer: MEDICARE

## 2020-09-26 LAB
ALBUMIN SERPL-MCNC: 3.1 G/DL (ref 3.5–5.1)
ALP BLD-CCNC: 64 U/L (ref 38–126)
ALT SERPL-CCNC: 70 U/L (ref 11–66)
ANION GAP SERPL CALCULATED.3IONS-SCNC: 11 MEQ/L (ref 8–16)
AST SERPL-CCNC: 33 U/L (ref 5–40)
BASOPHILS # BLD: 0.1 %
BASOPHILS ABSOLUTE: 0 THOU/MM3 (ref 0–0.1)
BILIRUB SERPL-MCNC: 0.5 MG/DL (ref 0.3–1.2)
BUN BLDV-MCNC: 20 MG/DL (ref 7–22)
CALCIUM SERPL-MCNC: 8.9 MG/DL (ref 8.5–10.5)
CHLORIDE BLD-SCNC: 110 MEQ/L (ref 98–111)
CO2: 26 MEQ/L (ref 23–33)
CREAT SERPL-MCNC: 1.1 MG/DL (ref 0.4–1.2)
EOSINOPHIL # BLD: 0 %
EOSINOPHILS ABSOLUTE: 0 THOU/MM3 (ref 0–0.4)
ERYTHROCYTE [DISTWIDTH] IN BLOOD BY AUTOMATED COUNT: 17.8 % (ref 11.5–14.5)
ERYTHROCYTE [DISTWIDTH] IN BLOOD BY AUTOMATED COUNT: 61.2 FL (ref 35–45)
GFR SERPL CREATININE-BSD FRML MDRD: 85 ML/MIN/1.73M2
GLUCOSE BLD-MCNC: 203 MG/DL (ref 70–108)
GLUCOSE BLD-MCNC: 208 MG/DL (ref 70–108)
GLUCOSE BLD-MCNC: 217 MG/DL (ref 70–108)
GLUCOSE BLD-MCNC: 218 MG/DL (ref 70–108)
GLUCOSE BLD-MCNC: 227 MG/DL (ref 70–108)
GLUCOSE BLD-MCNC: 230 MG/DL (ref 70–108)
GLUCOSE BLD-MCNC: 234 MG/DL (ref 70–108)
HCT VFR BLD CALC: 34.2 % (ref 42–52)
HEMOGLOBIN: 10.4 GM/DL (ref 14–18)
IMMATURE GRANS (ABS): 0.11 THOU/MM3 (ref 0–0.07)
IMMATURE GRANULOCYTES: 1.5 %
LACTIC ACID: 1.3 MMOL/L (ref 0.5–2.2)
LYMPHOCYTES # BLD: 11.3 %
LYMPHOCYTES ABSOLUTE: 0.8 THOU/MM3 (ref 1–4.8)
MCH RBC QN AUTO: 28.7 PG (ref 26–33)
MCHC RBC AUTO-ENTMCNC: 30.4 GM/DL (ref 32.2–35.5)
MCV RBC AUTO: 94.5 FL (ref 80–94)
MONOCYTES # BLD: 9.5 %
MONOCYTES ABSOLUTE: 0.7 THOU/MM3 (ref 0.4–1.3)
NUCLEATED RED BLOOD CELLS: 1 /100 WBC
PLATELET # BLD: 279 THOU/MM3 (ref 130–400)
PMV BLD AUTO: 11.3 FL (ref 9.4–12.4)
POTASSIUM SERPL-SCNC: 4.3 MEQ/L (ref 3.5–5.2)
PROCALCITONIN: 0.19 NG/ML (ref 0.01–0.09)
RBC # BLD: 3.62 MILL/MM3 (ref 4.7–6.1)
SEG NEUTROPHILS: 77.6 %
SEGMENTED NEUTROPHILS ABSOLUTE COUNT: 5.7 THOU/MM3 (ref 1.8–7.7)
SODIUM BLD-SCNC: 147 MEQ/L (ref 135–145)
TOTAL PROTEIN: 6.2 G/DL (ref 6.1–8)
URINE CULTURE, ROUTINE: NORMAL
WBC # BLD: 7.3 THOU/MM3 (ref 4.8–10.8)

## 2020-09-26 PROCEDURE — 99291 CRITICAL CARE FIRST HOUR: CPT | Performed by: INTERNAL MEDICINE

## 2020-09-26 PROCEDURE — 2100000000 HC CCU R&B

## 2020-09-26 PROCEDURE — 74018 RADEX ABDOMEN 1 VIEW: CPT

## 2020-09-26 PROCEDURE — 6360000002 HC RX W HCPCS: Performed by: NURSE PRACTITIONER

## 2020-09-26 PROCEDURE — 94640 AIRWAY INHALATION TREATMENT: CPT

## 2020-09-26 PROCEDURE — 6370000000 HC RX 637 (ALT 250 FOR IP): Performed by: NURSE PRACTITIONER

## 2020-09-26 PROCEDURE — 2500000003 HC RX 250 WO HCPCS: Performed by: NURSE PRACTITIONER

## 2020-09-26 PROCEDURE — 2580000003 HC RX 258: Performed by: NURSE PRACTITIONER

## 2020-09-26 PROCEDURE — 94003 VENT MGMT INPAT SUBQ DAY: CPT

## 2020-09-26 PROCEDURE — 6370000000 HC RX 637 (ALT 250 FOR IP): Performed by: HOSPITALIST

## 2020-09-26 PROCEDURE — 6370000000 HC RX 637 (ALT 250 FOR IP): Performed by: INTERNAL MEDICINE

## 2020-09-26 PROCEDURE — 89220 SPUTUM SPECIMEN COLLECTION: CPT

## 2020-09-26 PROCEDURE — 36415 COLL VENOUS BLD VENIPUNCTURE: CPT

## 2020-09-26 PROCEDURE — 82948 REAGENT STRIP/BLOOD GLUCOSE: CPT

## 2020-09-26 PROCEDURE — 80053 COMPREHEN METABOLIC PANEL: CPT

## 2020-09-26 PROCEDURE — 2700000000 HC OXYGEN THERAPY PER DAY

## 2020-09-26 PROCEDURE — 84145 PROCALCITONIN (PCT): CPT

## 2020-09-26 PROCEDURE — 51702 INSERT TEMP BLADDER CATH: CPT

## 2020-09-26 PROCEDURE — 36592 COLLECT BLOOD FROM PICC: CPT

## 2020-09-26 PROCEDURE — 6360000002 HC RX W HCPCS: Performed by: HOSPITALIST

## 2020-09-26 PROCEDURE — 6370000000 HC RX 637 (ALT 250 FOR IP): Performed by: STUDENT IN AN ORGANIZED HEALTH CARE EDUCATION/TRAINING PROGRAM

## 2020-09-26 PROCEDURE — 85025 COMPLETE CBC W/AUTO DIFF WBC: CPT

## 2020-09-26 PROCEDURE — 83605 ASSAY OF LACTIC ACID: CPT

## 2020-09-26 PROCEDURE — 2580000003 HC RX 258: Performed by: HOSPITALIST

## 2020-09-26 PROCEDURE — 94761 N-INVAS EAR/PLS OXIMETRY MLT: CPT

## 2020-09-26 RX ADMIN — PIPERACILLIN AND TAZOBACTAM 3.38 G: 3; .375 INJECTION, POWDER, LYOPHILIZED, FOR SOLUTION INTRAVENOUS at 03:00

## 2020-09-26 RX ADMIN — ENOXAPARIN SODIUM 130 MG: 150 INJECTION SUBCUTANEOUS at 18:42

## 2020-09-26 RX ADMIN — DEXAMETHASONE SODIUM PHOSPHATE 4 MG: 4 INJECTION, SOLUTION INTRAMUSCULAR; INTRAVENOUS at 08:41

## 2020-09-26 RX ADMIN — CITALOPRAM 30 MG: 20 TABLET, FILM COATED ORAL at 08:39

## 2020-09-26 RX ADMIN — SODIUM CHLORIDE: 9 INJECTION, SOLUTION INTRAVENOUS at 15:34

## 2020-09-26 RX ADMIN — FAMOTIDINE 20 MG: 10 INJECTION INTRAVENOUS at 08:41

## 2020-09-26 RX ADMIN — SODIUM CHLORIDE, PRESERVATIVE FREE 10 ML: 5 INJECTION INTRAVENOUS at 09:03

## 2020-09-26 RX ADMIN — PIPERACILLIN AND TAZOBACTAM 3.38 G: 3; .375 INJECTION, POWDER, LYOPHILIZED, FOR SOLUTION INTRAVENOUS at 11:19

## 2020-09-26 RX ADMIN — INSULIN LISPRO 6 UNITS: 100 INJECTION, SOLUTION INTRAVENOUS; SUBCUTANEOUS at 11:21

## 2020-09-26 RX ADMIN — INSULIN GLARGINE 100 UNITS: 100 INJECTION, SOLUTION SUBCUTANEOUS at 08:41

## 2020-09-26 RX ADMIN — SODIUM CHLORIDE, PRESERVATIVE FREE 10 ML: 5 INJECTION INTRAVENOUS at 20:16

## 2020-09-26 RX ADMIN — BUSPIRONE HYDROCHLORIDE 10 MG: 10 TABLET ORAL at 20:15

## 2020-09-26 RX ADMIN — GABAPENTIN 800 MG: 400 CAPSULE ORAL at 20:15

## 2020-09-26 RX ADMIN — INSULIN LISPRO 6 UNITS: 100 INJECTION, SOLUTION INTRAVENOUS; SUBCUTANEOUS at 04:00

## 2020-09-26 RX ADMIN — PROPOFOL 40 MCG/KG/MIN: 10 INJECTION, EMULSION INTRAVENOUS at 05:16

## 2020-09-26 RX ADMIN — PROPOFOL 45 MCG/KG/MIN: 10 INJECTION, EMULSION INTRAVENOUS at 20:11

## 2020-09-26 RX ADMIN — ENOXAPARIN SODIUM 130 MG: 150 INJECTION SUBCUTANEOUS at 06:05

## 2020-09-26 RX ADMIN — FAMOTIDINE 20 MG: 10 INJECTION INTRAVENOUS at 20:13

## 2020-09-26 RX ADMIN — ASPIRIN 81 MG: 81 TABLET, CHEWABLE ORAL at 08:41

## 2020-09-26 RX ADMIN — INSULIN LISPRO 6 UNITS: 100 INJECTION, SOLUTION INTRAVENOUS; SUBCUTANEOUS at 00:00

## 2020-09-26 RX ADMIN — PROPOFOL 40 MCG/KG/MIN: 10 INJECTION, EMULSION INTRAVENOUS at 11:18

## 2020-09-26 RX ADMIN — PROPOFOL 45 MCG/KG/MIN: 10 INJECTION, EMULSION INTRAVENOUS at 17:30

## 2020-09-26 RX ADMIN — Medication 15 ML: at 20:13

## 2020-09-26 RX ADMIN — PIPERACILLIN AND TAZOBACTAM 3.38 G: 3; .375 INJECTION, POWDER, LYOPHILIZED, FOR SOLUTION INTRAVENOUS at 18:42

## 2020-09-26 RX ADMIN — BUSPIRONE HYDROCHLORIDE 10 MG: 10 TABLET ORAL at 08:40

## 2020-09-26 RX ADMIN — Medication 2000 UNITS: at 08:40

## 2020-09-26 RX ADMIN — POLYETHYLENE GLYCOL 3350 17 G: 17 POWDER, FOR SOLUTION ORAL at 08:41

## 2020-09-26 RX ADMIN — INSULIN LISPRO 6 UNITS: 100 INJECTION, SOLUTION INTRAVENOUS; SUBCUTANEOUS at 08:42

## 2020-09-26 RX ADMIN — ALLOPURINOL 200 MG: 100 TABLET ORAL at 08:40

## 2020-09-26 RX ADMIN — SODIUM CHLORIDE: 9 INJECTION, SOLUTION INTRAVENOUS at 23:27

## 2020-09-26 RX ADMIN — GLYCOPYRROLATE AND FORMOTEROL FUMARATE 2 PUFF: 9; 4.8 AEROSOL, METERED RESPIRATORY (INHALATION) at 17:51

## 2020-09-26 RX ADMIN — ATORVASTATIN CALCIUM 40 MG: 40 TABLET, FILM COATED ORAL at 20:15

## 2020-09-26 RX ADMIN — Medication 15 ML: at 09:04

## 2020-09-26 RX ADMIN — MULTIPLE VITAMINS W/ MINERALS TAB 1 TABLET: TAB at 08:41

## 2020-09-26 RX ADMIN — PROPOFOL 45 MCG/KG/MIN: 10 INJECTION, EMULSION INTRAVENOUS at 23:27

## 2020-09-26 RX ADMIN — INSULIN LISPRO 6 UNITS: 100 INJECTION, SOLUTION INTRAVENOUS; SUBCUTANEOUS at 17:23

## 2020-09-26 RX ADMIN — INSULIN LISPRO 6 UNITS: 100 INJECTION, SOLUTION INTRAVENOUS; SUBCUTANEOUS at 20:19

## 2020-09-26 RX ADMIN — FUROSEMIDE 40 MG: 40 TABLET ORAL at 08:41

## 2020-09-26 RX ADMIN — GLYCOPYRROLATE AND FORMOTEROL FUMARATE 2 PUFF: 9; 4.8 AEROSOL, METERED RESPIRATORY (INHALATION) at 08:05

## 2020-09-26 RX ADMIN — SENNOSIDES 8.6 MG: 8.6 TABLET, FILM COATED ORAL at 20:14

## 2020-09-26 RX ADMIN — ARIPIPRAZOLE 15 MG: 15 TABLET ORAL at 08:41

## 2020-09-26 RX ADMIN — INSULIN GLARGINE 100 UNITS: 100 INJECTION, SOLUTION SUBCUTANEOUS at 20:19

## 2020-09-26 RX ADMIN — GABAPENTIN 800 MG: 400 CAPSULE ORAL at 08:43

## 2020-09-26 RX ADMIN — FOLIC ACID 1 MG: 1 TABLET ORAL at 08:41

## 2020-09-26 RX ADMIN — SENNOSIDES 8.6 MG: 8.6 TABLET, FILM COATED ORAL at 09:04

## 2020-09-26 ASSESSMENT — PULMONARY FUNCTION TESTS
PIF_VALUE: 27
PIF_VALUE: 25
PIF_VALUE: 27
PIF_VALUE: 25
PIF_VALUE: 25
PIF_VALUE: 27

## 2020-09-26 ASSESSMENT — PAIN SCALES - WONG BAKER
WONGBAKER_NUMERICALRESPONSE: 0

## 2020-09-26 ASSESSMENT — PAIN SCALES - GENERAL
PAINLEVEL_OUTOF10: 0

## 2020-09-26 NOTE — PROGRESS NOTES
therapy.  Continue Decadron  4 mg daily for total of 4 doses ( day 3/4).    Decadron is only therapy noted to decrease mortality risk and severe COVID infections. Continue aspirin 81 mg daily for treatment and prevention microthrombosis which is noted to be common in COVID/ARDS conditions. 4. ARDS:  Secondary to complications of UNAVH-43. Progressive bilateral infiltrates. Continue with mechanical ventilator support. Continue with lung protection strategies and permissive hypercapnia. Patient is too large for pronation therapy. 5. Uncontrolled type 2 diabetes: Noncompliant, last A1c 9.9%. beta hydroxybutyrate 22.95. Patient's blood sugars seem to be significantly improving.   Patient on tube feeds will initiate 200 mL free water flushes 3 times daily. Stop insulin drip and will initiate lantus 100 units BID as well as high-dose sliding scale four times a day. Continue gabapentin 800 mg twice daily. 6. History of BPH with associated urinary retention: Patient had about 1 L of urine retention and Dimas catheter had been inserted.  Patient takes Flomax at home, unable to continue use of Flomax since it cannot be crushed.  We will continue to follow culture  7. Chronic diastolic heart failure: Echocardiogram on 10/10/2019 demonstrated EF 55 to 60%.  Patient previously reported increased lower extremity edema prior admission 1 week ago.  proBNP negative.  Patient weight increased 4 pounds since last admission.  Patient currently has bilateral lower extremity edema however intravascularly appears dry. Hold Lasix.  Strict I's and O's and daily weights.  Echo 9/24/20 was significant for mild to moderate aortic stenosis with an ejection fraction 60%. Resume Lasix 40 mg daily  2.  History of paroxysmal atrial fibrillation: Rate controlled - Cardizem 60mg BID.  Patient was previously on Eliquis however due to weight being greater than 120 kg. Lovenox 120 mg subcutaneous BID is indicated as treatment of choice for anticoagulation;    8. Morbid obesity: BMI 43.33.  Will educate patient on importance of weight loss when appropriate  9. Obstructive sleep apnea: We will encourage patient to wear home CPAP after extubated  10. Vitamin D deficiency: Continue vitamin D supplementation  11. History of hyperlipidemia: Continue Lipitor 40 mg nightly  12. History of gout: Continue allopurinol  13. History of essential hypertension:   Hold Norvasc and hydralazine as patient is currently intubated and requiring pressor therapy  14. History of anxiety/depression: Continue BuSpar, Celexa and Abilify    INITIAL H AND P AND ICU COURSE:  52-year-old male presented to the [facility] emergency department complaining of progressive worsening shortness of breath for the last 2 days.  Patient denies chest pain, fever, chills, diarrhea, urinary symptoms.  Patient admits to bilateral lower extremity committee swelling.  Patient admits that he is unsure if he has been taking his medication as directed from prior discharge.     Patient has a significant past medical history for obstructive sleep apnea which uses CPAP machine, obesity, anxiety, depression, hypertension, hyperlipidemia, rheumatoid arthritis, type 2 diabetes and atrial fibrillation.  Patient was diagnosed with COVID19  pneumonia and admitted to [facility] last month. Paco Hair was treated at that time with Decadron, Remdesivir and azithromycin patient was discharged on 8/12/2020.  9/6/2020 patient was brought back to the emergency room department via EMS because he was found to be hypoxic at [address] was treated with Decadron 6 mg for 3 days and discharged 9/15/20 to home apartment with home health.  Prior to discharge on 9/15/2020 home oxygen evaluation was performed prior to discharge on 9/15/2020. Paco Hair was requiring 6 L of oxygen with activity however did not require oxygen at rest.     On arrival to [facility] patient states he is having increasing shortness of breath.  However denies chest pain, nausea, vomiting, diarrhea, fatigue, fever, chills.        Past Medical History: Vitamin D deficiency, obstructive sleep apnea uses CPAP at night, nonalcoholic steatohepatitis, morbid obesity, major depression, hypogonadism, essential hypertension, hyperlipidemia, history of osteomyelitis, history of alcohol abuse, heart failure with preserved ejection fraction, GERD, type 2 diabetes, chronic gout, CAD, BPH, atrial fibrillation, rheumatoid arthritis  Family History: Mother-heart disease with history of MI at 48years of age, .  Paternal aunt -lung cancer  Social History: History of alcohol abuse, lifetime non-smoker, denies substance abuse, , resides at [place] [id number]  Unable to obtain secondary to patient intubated and sedated    Scheduled Meds:   insulin lispro  0-18 Units Subcutaneous Q4H    insulin glargine  100 Units Subcutaneous BID    polyethylene glycol  17 g Oral Every Other Day    piperacillin-tazobactam  3.375 g Intravenous Q8H    allopurinol  200 mg Oral Daily    [Held by provider] amLODIPine  5 mg Oral Daily    ARIPiprazole  15 mg Oral Daily    aspirin  81 mg Oral Daily    atorvastatin  40 mg Oral Nightly    busPIRone  10 mg Oral BID    citalopram  30 mg Oral Daily    Vitamin D  2,000 Units Oral Daily    dilTIAZem  60 mg Oral BID    folic acid  1 mg Oral Daily    furosemide  40 mg Oral Daily    gabapentin  800 mg Oral BID    [Held by provider] hydrALAZINE  50 mg Oral BID    enoxaparin  1 mg/kg Subcutaneous Q12H    [Held by provider] tamsulosin  0.4 mg Oral Nightly    sodium chloride flush  10 mL Intravenous 2 times per day    chlorhexidine  15 mL Mouth/Throat BID    famotidine (PEPCID) injection  20 mg Intravenous BID    glycopyrrolate-formoterol  2 puff Inhalation BID    dexamethasone  4 mg Intravenous Daily    therapeutic multivitamin-minerals  1 tablet Oral Daily    senna  1 tablet Oral BID Continuous Infusions:   norepinephrine Stopped (09/25/20 0002)    sodium chloride 20 mL/hr at 09/26/20 0402    propofol 40 mcg/kg/min (09/26/20 0402)    dextrose         PHYSICAL EXAMINATION:  T: 98.4.  P: 53. RR:  17. B/P: 107/69 FiO2: 40%. O2 Sat: 96%. I/O: 1104/1000  Body mass index is 43.33 kg/m². Mode: PVC+ Measured Rate: 16 Pressure Support: 20 PEEP: 6  GCS:   10  General:   Acute on chronically ill male that is currently critically ill on mechanical ventilation and sedated  HEENT:  normocephalic and atraumatic. No scleral icterus. PERR  Neck: supple. No Thyromegaly. Lungs: Diminished brath sounds bilaterally. No retractions  Cardiac: distant heart sounds, grade 2/6 murmur . No JVD. Abdomen: soft. Nontender. Hypoactive bowel sounds   Extremities:  No clubbing or cyanosis. +1 pitting edema bilaterally   Vasculature: capillary refill < 3 seconds. Faint dorsalis pedis pulses. Skin:  warm and dry. Psych: Unable to assess secondary to patient being intubated and sedated  Lymph:  No supraclavicular adenopathy. Neurologic:  No focal deficit. No seizures. Data: (All radiographs, tracings, PFTs, and imaging are personally viewed and interpreted unless otherwise noted).  Sodium 147, potassium 4.3, chloride 110, CO2 26, BUN 20, creatinine 1.1 calcium, 8.9   Procalcitonin 0.19   Albumin 3.1, alk phos 64, ALT 70, AST 33, bilirubin 0.5, total protein 6.3   WBC 7.3, hemoglobin 10.4, hematocrit 34.2, platelets 637   Telemetry shows sinus bradycardia  · ABG pH 7.34/PCO2 56/ PO2 83/bicarb 30  · Chest x-ray 9/24/20 reports worsening bilateral pneumonia   · Chest x-ray 9/23/20 demonstrated bilateral pulmonary opacifications worse than on previous study on September 10, 2020. Possible worsening inflammatory process, possible COVID-19 infection, borderline cardiomegaly opacities   · Echocardiogram 9/24/2020 reports: Ejection fraction is visually estimated at 60%.    Overall left ventricular function is normal. The aortic valve leaflets were not well visualized.  Aortic valve appears tricuspid. Aortic valve leaflets are somewhat thickened. Aortic valve leaflets are Mildly calcified. The maximum aortic valve gradient is 30 mmHg, the mean gradient is 15 mmHg, and the peak velocity is 275 cm/s. There is mild-to-moderate aortic stenosis with valve area of 1.5 sq cm. Meets Continued ICU Level Care Criteria:    [x] Yes   [] No - Transfer Planned to listed location:  [] HOSPITALIST CONTACTED-      Case and plan discussed with Dr. Prudencio Davis.         Electronically signed by Edelmira Fernandez DO  CRITICAL CARE SPECIALIST

## 2020-09-26 NOTE — PLAN OF CARE
Problem: MECHANICAL VENTILATION  Goal: Normal spontaneous ventilation  Outcome: Ongoing  Note: Vent setting optimized to achieve target tidal volume, respiratory rate and ideal oxygen saturations. SBT will be performed when appropriate.

## 2020-09-27 ENCOUNTER — APPOINTMENT (OUTPATIENT)
Dept: GENERAL RADIOLOGY | Age: 50
DRG: 870 | End: 2020-09-27
Payer: MEDICARE

## 2020-09-27 LAB
ALBUMIN SERPL-MCNC: 3.2 G/DL (ref 3.5–5.1)
ALP BLD-CCNC: 67 U/L (ref 38–126)
ALT SERPL-CCNC: 53 U/L (ref 11–66)
ANION GAP SERPL CALCULATED.3IONS-SCNC: 9 MEQ/L (ref 8–16)
AST SERPL-CCNC: 17 U/L (ref 5–40)
BASOPHILS # BLD: 0.3 %
BASOPHILS ABSOLUTE: 0 THOU/MM3 (ref 0–0.1)
BILIRUB SERPL-MCNC: 0.4 MG/DL (ref 0.3–1.2)
BUN BLDV-MCNC: 21 MG/DL (ref 7–22)
CALCIUM SERPL-MCNC: 8.9 MG/DL (ref 8.5–10.5)
CHLORIDE BLD-SCNC: 104 MEQ/L (ref 98–111)
CO2: 29 MEQ/L (ref 23–33)
CREAT SERPL-MCNC: 0.9 MG/DL (ref 0.4–1.2)
EOSINOPHIL # BLD: 0 %
EOSINOPHILS ABSOLUTE: 0 THOU/MM3 (ref 0–0.4)
ERYTHROCYTE [DISTWIDTH] IN BLOOD BY AUTOMATED COUNT: 16.9 % (ref 11.5–14.5)
ERYTHROCYTE [DISTWIDTH] IN BLOOD BY AUTOMATED COUNT: 57 FL (ref 35–45)
GFR SERPL CREATININE-BSD FRML MDRD: > 90 ML/MIN/1.73M2
GLUCOSE BLD-MCNC: 183 MG/DL (ref 70–108)
GLUCOSE BLD-MCNC: 211 MG/DL (ref 70–108)
GLUCOSE BLD-MCNC: 224 MG/DL (ref 70–108)
GLUCOSE BLD-MCNC: 235 MG/DL (ref 70–108)
GLUCOSE BLD-MCNC: 241 MG/DL (ref 70–108)
GLUCOSE BLD-MCNC: 245 MG/DL (ref 70–108)
GLUCOSE BLD-MCNC: 265 MG/DL (ref 70–108)
GRAM STAIN RESULT: ABNORMAL
HCT VFR BLD CALC: 35 % (ref 42–52)
HEMOGLOBIN: 11 GM/DL (ref 14–18)
IMMATURE GRANS (ABS): 0.33 THOU/MM3 (ref 0–0.07)
IMMATURE GRANULOCYTES: 4.2 %
LACTIC ACID: 1.1 MMOL/L (ref 0.5–2.2)
LYMPHOCYTES # BLD: 12 %
LYMPHOCYTES ABSOLUTE: 0.9 THOU/MM3 (ref 1–4.8)
MCH RBC QN AUTO: 28.9 PG (ref 26–33)
MCHC RBC AUTO-ENTMCNC: 31.4 GM/DL (ref 32.2–35.5)
MCV RBC AUTO: 92.1 FL (ref 80–94)
MONOCYTES # BLD: 9.1 %
MONOCYTES ABSOLUTE: 0.7 THOU/MM3 (ref 0.4–1.3)
NUCLEATED RED BLOOD CELLS: 1 /100 WBC
ORGANISM: ABNORMAL
PLATELET # BLD: 265 THOU/MM3 (ref 130–400)
PMV BLD AUTO: 11.2 FL (ref 9.4–12.4)
POTASSIUM SERPL-SCNC: 3.8 MEQ/L (ref 3.5–5.2)
PROCALCITONIN: 0.19 NG/ML (ref 0.01–0.09)
RBC # BLD: 3.8 MILL/MM3 (ref 4.7–6.1)
RESPIRATORY CULTURE: ABNORMAL
RESPIRATORY CULTURE: ABNORMAL
SEG NEUTROPHILS: 74.4 %
SEGMENTED NEUTROPHILS ABSOLUTE COUNT: 5.8 THOU/MM3 (ref 1.8–7.7)
SODIUM BLD-SCNC: 142 MEQ/L (ref 135–145)
TOTAL PROTEIN: 6.2 G/DL (ref 6.1–8)
WBC # BLD: 7.8 THOU/MM3 (ref 4.8–10.8)

## 2020-09-27 PROCEDURE — 6360000002 HC RX W HCPCS: Performed by: NURSE PRACTITIONER

## 2020-09-27 PROCEDURE — 99291 CRITICAL CARE FIRST HOUR: CPT | Performed by: INTERNAL MEDICINE

## 2020-09-27 PROCEDURE — 94761 N-INVAS EAR/PLS OXIMETRY MLT: CPT

## 2020-09-27 PROCEDURE — 94640 AIRWAY INHALATION TREATMENT: CPT

## 2020-09-27 PROCEDURE — 2500000003 HC RX 250 WO HCPCS: Performed by: INTERNAL MEDICINE

## 2020-09-27 PROCEDURE — 0BJ08ZZ INSPECTION OF TRACHEOBRONCHIAL TREE, VIA NATURAL OR ARTIFICIAL OPENING ENDOSCOPIC: ICD-10-PCS | Performed by: INTERNAL MEDICINE

## 2020-09-27 PROCEDURE — 83605 ASSAY OF LACTIC ACID: CPT

## 2020-09-27 PROCEDURE — 3609027000 HC BRONCHOSCOPY

## 2020-09-27 PROCEDURE — 80053 COMPREHEN METABOLIC PANEL: CPT

## 2020-09-27 PROCEDURE — 84145 PROCALCITONIN (PCT): CPT

## 2020-09-27 PROCEDURE — 2580000003 HC RX 258: Performed by: NURSE PRACTITIONER

## 2020-09-27 PROCEDURE — 6370000000 HC RX 637 (ALT 250 FOR IP): Performed by: HOSPITALIST

## 2020-09-27 PROCEDURE — 94770 HC ETCO2 MONITOR DAILY: CPT

## 2020-09-27 PROCEDURE — 2100000000 HC CCU R&B

## 2020-09-27 PROCEDURE — 2580000003 HC RX 258: Performed by: HOSPITALIST

## 2020-09-27 PROCEDURE — 36415 COLL VENOUS BLD VENIPUNCTURE: CPT

## 2020-09-27 PROCEDURE — 6370000000 HC RX 637 (ALT 250 FOR IP): Performed by: NURSE PRACTITIONER

## 2020-09-27 PROCEDURE — 2500000003 HC RX 250 WO HCPCS: Performed by: NURSE PRACTITIONER

## 2020-09-27 PROCEDURE — 85025 COMPLETE CBC W/AUTO DIFF WBC: CPT

## 2020-09-27 PROCEDURE — 94003 VENT MGMT INPAT SUBQ DAY: CPT

## 2020-09-27 PROCEDURE — 2720000010 HC SURG SUPPLY STERILE

## 2020-09-27 PROCEDURE — 6370000000 HC RX 637 (ALT 250 FOR IP): Performed by: STUDENT IN AN ORGANIZED HEALTH CARE EDUCATION/TRAINING PROGRAM

## 2020-09-27 PROCEDURE — 6360000002 HC RX W HCPCS: Performed by: HOSPITALIST

## 2020-09-27 PROCEDURE — 89220 SPUTUM SPECIMEN COLLECTION: CPT

## 2020-09-27 PROCEDURE — 82948 REAGENT STRIP/BLOOD GLUCOSE: CPT

## 2020-09-27 PROCEDURE — 71045 X-RAY EXAM CHEST 1 VIEW: CPT

## 2020-09-27 PROCEDURE — 2700000000 HC OXYGEN THERAPY PER DAY

## 2020-09-27 RX ORDER — ROCURONIUM BROMIDE 10 MG/ML
50 INJECTION, SOLUTION INTRAVENOUS ONCE
Status: COMPLETED | OUTPATIENT
Start: 2020-09-27 | End: 2020-09-27

## 2020-09-27 RX ADMIN — Medication 15 ML: at 20:22

## 2020-09-27 RX ADMIN — SODIUM CHLORIDE, PRESERVATIVE FREE 10 ML: 5 INJECTION INTRAVENOUS at 20:58

## 2020-09-27 RX ADMIN — PROPOFOL 45 MCG/KG/MIN: 10 INJECTION, EMULSION INTRAVENOUS at 11:49

## 2020-09-27 RX ADMIN — GABAPENTIN 800 MG: 400 CAPSULE ORAL at 09:54

## 2020-09-27 RX ADMIN — DEXAMETHASONE SODIUM PHOSPHATE 4 MG: 4 INJECTION, SOLUTION INTRAMUSCULAR; INTRAVENOUS at 09:55

## 2020-09-27 RX ADMIN — PIPERACILLIN AND TAZOBACTAM 3.38 G: 3; .375 INJECTION, POWDER, LYOPHILIZED, FOR SOLUTION INTRAVENOUS at 03:15

## 2020-09-27 RX ADMIN — GLYCOPYRROLATE AND FORMOTEROL FUMARATE 2 PUFF: 9; 4.8 AEROSOL, METERED RESPIRATORY (INHALATION) at 08:52

## 2020-09-27 RX ADMIN — PROPOFOL 40 MCG/KG/MIN: 10 INJECTION, EMULSION INTRAVENOUS at 02:29

## 2020-09-27 RX ADMIN — BUSPIRONE HYDROCHLORIDE 10 MG: 10 TABLET ORAL at 20:14

## 2020-09-27 RX ADMIN — ARIPIPRAZOLE 15 MG: 15 TABLET ORAL at 09:55

## 2020-09-27 RX ADMIN — ROCURONIUM BROMIDE 50 MG: 50 INJECTION, SOLUTION INTRAVENOUS at 13:15

## 2020-09-27 RX ADMIN — INSULIN LISPRO 6 UNITS: 100 INJECTION, SOLUTION INTRAVENOUS; SUBCUTANEOUS at 16:54

## 2020-09-27 RX ADMIN — MULTIPLE VITAMINS W/ MINERALS TAB 1 TABLET: TAB at 09:55

## 2020-09-27 RX ADMIN — Medication 2000 UNITS: at 09:54

## 2020-09-27 RX ADMIN — SODIUM CHLORIDE: 9 INJECTION, SOLUTION INTRAVENOUS at 10:04

## 2020-09-27 RX ADMIN — INSULIN LISPRO 6 UNITS: 100 INJECTION, SOLUTION INTRAVENOUS; SUBCUTANEOUS at 01:04

## 2020-09-27 RX ADMIN — INSULIN GLARGINE 100 UNITS: 100 INJECTION, SOLUTION SUBCUTANEOUS at 09:55

## 2020-09-27 RX ADMIN — INSULIN LISPRO 6 UNITS: 100 INJECTION, SOLUTION INTRAVENOUS; SUBCUTANEOUS at 05:27

## 2020-09-27 RX ADMIN — PIPERACILLIN AND TAZOBACTAM 3.38 G: 3; .375 INJECTION, POWDER, LYOPHILIZED, FOR SOLUTION INTRAVENOUS at 20:14

## 2020-09-27 RX ADMIN — ENOXAPARIN SODIUM 130 MG: 150 INJECTION SUBCUTANEOUS at 05:29

## 2020-09-27 RX ADMIN — ENOXAPARIN SODIUM 130 MG: 150 INJECTION SUBCUTANEOUS at 18:03

## 2020-09-27 RX ADMIN — PROPOFOL 35 MCG/KG/MIN: 10 INJECTION, EMULSION INTRAVENOUS at 22:11

## 2020-09-27 RX ADMIN — ALBUTEROL SULFATE 5 MG: 2.5 SOLUTION RESPIRATORY (INHALATION) at 09:11

## 2020-09-27 RX ADMIN — ALBUTEROL SULFATE 5 MG: 2.5 SOLUTION RESPIRATORY (INHALATION) at 13:09

## 2020-09-27 RX ADMIN — FAMOTIDINE 20 MG: 10 INJECTION INTRAVENOUS at 20:51

## 2020-09-27 RX ADMIN — ATORVASTATIN CALCIUM 40 MG: 40 TABLET, FILM COATED ORAL at 20:14

## 2020-09-27 RX ADMIN — INSULIN LISPRO 9 UNITS: 100 INJECTION, SOLUTION INTRAVENOUS; SUBCUTANEOUS at 20:51

## 2020-09-27 RX ADMIN — SENNOSIDES 8.6 MG: 8.6 TABLET, FILM COATED ORAL at 09:55

## 2020-09-27 RX ADMIN — SODIUM CHLORIDE, PRESERVATIVE FREE 10 ML: 5 INJECTION INTRAVENOUS at 10:03

## 2020-09-27 RX ADMIN — PROPOFOL 40 MCG/KG/MIN: 10 INJECTION, EMULSION INTRAVENOUS at 18:17

## 2020-09-27 RX ADMIN — CITALOPRAM 30 MG: 20 TABLET, FILM COATED ORAL at 09:55

## 2020-09-27 RX ADMIN — INSULIN LISPRO 3 UNITS: 100 INJECTION, SOLUTION INTRAVENOUS; SUBCUTANEOUS at 09:56

## 2020-09-27 RX ADMIN — Medication 15 ML: at 09:55

## 2020-09-27 RX ADMIN — GLYCOPYRROLATE AND FORMOTEROL FUMARATE 2 PUFF: 9; 4.8 AEROSOL, METERED RESPIRATORY (INHALATION) at 20:38

## 2020-09-27 RX ADMIN — INSULIN LISPRO 6 UNITS: 100 INJECTION, SOLUTION INTRAVENOUS; SUBCUTANEOUS at 23:35

## 2020-09-27 RX ADMIN — FOLIC ACID 1 MG: 1 TABLET ORAL at 09:55

## 2020-09-27 RX ADMIN — BUSPIRONE HYDROCHLORIDE 10 MG: 10 TABLET ORAL at 09:55

## 2020-09-27 RX ADMIN — PROPOFOL 45 MCG/KG/MIN: 10 INJECTION, EMULSION INTRAVENOUS at 14:41

## 2020-09-27 RX ADMIN — FUROSEMIDE 40 MG: 40 TABLET ORAL at 11:15

## 2020-09-27 RX ADMIN — ASPIRIN 81 MG: 81 TABLET, CHEWABLE ORAL at 09:55

## 2020-09-27 RX ADMIN — FAMOTIDINE 20 MG: 10 INJECTION INTRAVENOUS at 09:55

## 2020-09-27 RX ADMIN — PIPERACILLIN AND TAZOBACTAM 3.38 G: 3; .375 INJECTION, POWDER, LYOPHILIZED, FOR SOLUTION INTRAVENOUS at 10:13

## 2020-09-27 RX ADMIN — INSULIN LISPRO 6 UNITS: 100 INJECTION, SOLUTION INTRAVENOUS; SUBCUTANEOUS at 11:59

## 2020-09-27 RX ADMIN — PROPOFOL 35 MCG/KG/MIN: 10 INJECTION, EMULSION INTRAVENOUS at 05:25

## 2020-09-27 RX ADMIN — GABAPENTIN 800 MG: 400 CAPSULE ORAL at 20:13

## 2020-09-27 RX ADMIN — SENNOSIDES 8.6 MG: 8.6 TABLET, FILM COATED ORAL at 20:13

## 2020-09-27 RX ADMIN — PROPOFOL 45 MCG/KG/MIN: 10 INJECTION, EMULSION INTRAVENOUS at 09:18

## 2020-09-27 RX ADMIN — ALLOPURINOL 200 MG: 100 TABLET ORAL at 09:55

## 2020-09-27 RX ADMIN — INSULIN GLARGINE 100 UNITS: 100 INJECTION, SOLUTION SUBCUTANEOUS at 20:51

## 2020-09-27 ASSESSMENT — PAIN SCALES - WONG BAKER

## 2020-09-27 ASSESSMENT — PULMONARY FUNCTION TESTS
PIF_VALUE: 31
PIF_VALUE: 26
PIF_VALUE: 28
PIF_VALUE: 25

## 2020-09-27 NOTE — FLOWSHEET NOTE
46- Dr. Torres Mayes and Cecile Drake, RT at [facility] for bronch. 1350- 50mg Rocuronium given per Dr. Torres Mayes. 56-  Dr. Torres Mayes inserting bronchoscope. 1352- Bronch completed.

## 2020-09-27 NOTE — PROGRESS NOTES
Intensive Care Unit  Medical Intensive Care Unit  Attending Progress Note      Patient was seen and evaluated with Dr. Claudeen Bonds. Team members present on rounds:  Nursing and Patient    ICU guidelines/prophylaxis active for the following:    head above bed above 30 degrees, insulin guidelines, DVT prophylaxis, ulcer prophylaxis and analgesia/sedation guidelines    Patient Examined? yes    Additions/differences/highlights to the resident's/fellow's exam:  VITALS:  /79   Pulse (!) 45   Temp 98.2 °F (36.8 °C) (Oral)   Resp 18   Ht 5' 8\" (1.727 m)   Wt 285 lb (129.3 kg)   SpO2 97%   BMI 43.33 kg/m²   24HR INTAKE/OUTPUT:    Intake/Output Summary (Last 24 hours) at 9/27/2020 8398  Last data filed at 9/27/2020 0534  Gross per 24 hour   Intake 2531.24 ml   Output 3450 ml   Net -918.76 ml     VENT SETTINGS:    Vent Information  $Ventilation: $Subsequent Day  Skin Assessment: Clean, dry, & intact  Suction Catheter Diameter: 14  Equipment ID: [id number]  Vent Type: C2G5 [place]  Vent Mode: AC/PC  Vt Ordered: 0 mL  Pressure Ordered: 26(p control 18)  Rate Set: 12 bmp  Peak Flow: 59 L/min  FiO2 : 35 %  SpO2: 97 %  SpO2/FiO2 ratio: 277.14  Sensitivity: 3  PEEP/CPAP: 8  I Time/ I Time %: 1 s  Humidification Source: Heated wire  Humidification Temp: 37  Humidification Temp Measured: 37  Circuit Condensation: Not drained  Nitric Oxide/Epoprostenol In Use?: No  Mask Type: Full face mask  Mask Size: Large  Additional Respiratory  Assessments  Pulse: (!) 45  Resp: 18  SpO2: 97 %  $End Tidal CO2: 40  Position: Semi-Clayton's  Humidification Source: Heated wire  Humidification Temp: 37  Circuit Condensation: Not drained  Oral Care: Mouth swabbed, Mouth suctioned  Subglottic Suction Done?: Yes  CONSTITUTIONAL:  Acute on chronically ill male that is currently critically ill on mechanical ventilation and sedated  HEENT:  normocephalic and atraumatic. No scleral icterus. PERR  Neck: supple. No Thyromegaly.   Lungs: Diminished brath sounds bilaterally. No retractions  Cardiac: distant heart sounds, grade 2/6 murmur . No JVD. Abdomen: soft. Nontender. Hypoactive bowel sounds   Extremities:  No clubbing or cyanosis. +1 pitting edema bilaterally   Vasculature: capillary refill < 3 seconds. Faint dorsalis pedis pulses. Skin:  warm and dry. Psych: Unable to assess secondary to patient being intubated and sedated  Lymph:  No supraclavicular adenopathy. Neurologic:  No focal deficit. No seizures. DATA:  CBC:   Lab Results   Component Value Date    WBC 7.8 09/27/2020    RBC 3.80 09/27/2020    RBC 4.55 04/15/2012    HGB 11.0 09/27/2020    HCT 35.0 09/27/2020    MCV 92.1 09/27/2020    RDW 19.9 04/06/2020     09/27/2020     CMP:    Lab Results   Component Value Date     09/27/2020    K 3.8 09/27/2020    K 4.6 09/07/2020     09/27/2020    CO2 29 09/27/2020    BUN 21 09/27/2020    CREATININE 0.9 09/27/2020    GFRAA >60 01/23/2019    AGRATIO 0.8 06/24/2018    LABGLOM >90 09/27/2020    GLUCOSE 224 09/27/2020    GLUCOSE 113 06/24/2018    PROT 6.2 09/27/2020    CALCIUM 8.9 09/27/2020    BILITOT 0.4 09/27/2020    ALKPHOS 67 09/27/2020    AST 17 09/27/2020    ALT 53 09/27/2020       KEY ISSUES/FINDINGS/ASSESSMENT AND PLAN:    Assessment and Plan:    1. Acute on chronic hypoxic respiratory failure/ Septic/hypovolemic shock: Due to severe pneumonia and volume depletion. Due to COVID.  Resolving, patient on vasopressors and the plan to keep the lUNG of the dry side with lung protective protocol. ARDS. Patient is morbidly obese with short neck and large tongue so probably will need tracheostomy for the weaning process. 2. Severe pneumonia: Due to COVID-19 infection , on conventional treatment for COVID-19 pneumonia that include Decadron, antiviral, aspirin, maintenance IV fluids, lung protective mechanisms.   Note of thick secretions getting out of from the ET tube was dyssynchrony with mechanical ventilator so fiberoptic appropriate  9. Obstructive sleep apnea: We will encourage patient to wear home CPAP after extubated  10. Vitamin D deficiency: Continue vitamin D supplementation  11. History of hyperlipidemia: Continue Lipitor 40 mg nightly  12. History of gout: Continue allopurinol  13. History of essential hypertension:   Hold Norvasc and hydralazine as patient is currently intubated and requiring pressor therapy  14.  History of anxiety/depression: Continue BuSpar, Celexa and Abilify    Critical condition with guarded prognosis  Cc time 33 min apart from procedures

## 2020-09-27 NOTE — PROCEDURES
Procedure title  Diagnostic/therapeutic fiberoptic bronchoscopy. Description of the procedure  Patient is intubated on sedation and painkiller, diagnosis with COVID-19 pneumonia/ARDS, Zemuron 50 mg IV given then we introduce our bronchoscopy through the adapter on top of the ET tube, with ET tube position above the madi, sharp madi, right lung right upper right lower lobe segments and right middle all patent with no mucus plugs with no bronchial lesion noted and severe bronchial wall congestion noted. Then we introduced our bronchoscopy to the left side with normal secondary madi left upper left lower lobes all patent with no mucus plugs, no endobronchial lesions and also severe bronchial wall congestion noted. We did remove a bronchoscopy with the patient did well pre-during and immediate postprocedure pulse ox 99 -100%. Impression:  Adequately position ET tube, with severe bronchial wall congestion of both lungs compatible with COVID-19  pneumonia /ARDS and no mucus plugs noted.

## 2020-09-27 NOTE — PROGRESS NOTES
Order was in the chart for bronchoscopy procedure. Verified that consent was signed. Time-out was done. ICU airway mobile scope SN [id number] was used. Assisted Dr. Yovani Herrera with bronchoscopy procedure. Bronchial washings were not obtained. Patient tolerated the procedure well. . All supplies were restocked. Patient's name placed in log book.

## 2020-09-28 LAB
ALBUMIN SERPL-MCNC: 2.9 G/DL (ref 3.5–5.1)
ALP BLD-CCNC: 62 U/L (ref 38–126)
ALT SERPL-CCNC: 48 U/L (ref 11–66)
ANION GAP SERPL CALCULATED.3IONS-SCNC: 12 MEQ/L (ref 8–16)
AST SERPL-CCNC: 22 U/L (ref 5–40)
BASOPHILS # BLD: 0.3 %
BASOPHILS ABSOLUTE: 0 THOU/MM3 (ref 0–0.1)
BILIRUB SERPL-MCNC: 0.4 MG/DL (ref 0.3–1.2)
BUN BLDV-MCNC: 18 MG/DL (ref 7–22)
CALCIUM SERPL-MCNC: 8.7 MG/DL (ref 8.5–10.5)
CHLORIDE BLD-SCNC: 103 MEQ/L (ref 98–111)
CO2: 30 MEQ/L (ref 23–33)
CREAT SERPL-MCNC: 0.7 MG/DL (ref 0.4–1.2)
EOSINOPHIL # BLD: 0.9 %
EOSINOPHILS ABSOLUTE: 0.1 THOU/MM3 (ref 0–0.4)
ERYTHROCYTE [DISTWIDTH] IN BLOOD BY AUTOMATED COUNT: 16.3 % (ref 11.5–14.5)
ERYTHROCYTE [DISTWIDTH] IN BLOOD BY AUTOMATED COUNT: 53.1 FL (ref 35–45)
GFR SERPL CREATININE-BSD FRML MDRD: > 90 ML/MIN/1.73M2
GLUCOSE BLD-MCNC: 130 MG/DL (ref 70–108)
GLUCOSE BLD-MCNC: 159 MG/DL (ref 70–108)
GLUCOSE BLD-MCNC: 164 MG/DL (ref 70–108)
GLUCOSE BLD-MCNC: 165 MG/DL (ref 70–108)
GLUCOSE BLD-MCNC: 169 MG/DL (ref 70–108)
GLUCOSE BLD-MCNC: 201 MG/DL (ref 70–108)
HCT VFR BLD CALC: 34.6 % (ref 42–52)
HEMOGLOBIN: 11.3 GM/DL (ref 14–18)
IMMATURE GRANS (ABS): 0.53 THOU/MM3 (ref 0–0.07)
IMMATURE GRANULOCYTES: 5.2 %
LACTIC ACID: 2 MMOL/L (ref 0.5–2.2)
LYMPHOCYTES # BLD: 13.3 %
LYMPHOCYTES ABSOLUTE: 1.3 THOU/MM3 (ref 1–4.8)
MCH RBC QN AUTO: 29.4 PG (ref 26–33)
MCHC RBC AUTO-ENTMCNC: 32.7 GM/DL (ref 32.2–35.5)
MCV RBC AUTO: 90.1 FL (ref 80–94)
MONOCYTES # BLD: 7.6 %
MONOCYTES ABSOLUTE: 0.8 THOU/MM3 (ref 0.4–1.3)
NUCLEATED RED BLOOD CELLS: 1 /100 WBC
PLATELET # BLD: 292 THOU/MM3 (ref 130–400)
PMV BLD AUTO: 11.5 FL (ref 9.4–12.4)
POTASSIUM SERPL-SCNC: 3.4 MEQ/L (ref 3.5–5.2)
PROCALCITONIN: 0.17 NG/ML (ref 0.01–0.09)
RBC # BLD: 3.84 MILL/MM3 (ref 4.7–6.1)
SEG NEUTROPHILS: 72.7 %
SEGMENTED NEUTROPHILS ABSOLUTE COUNT: 7.3 THOU/MM3 (ref 1.8–7.7)
SODIUM BLD-SCNC: 145 MEQ/L (ref 135–145)
TOTAL PROTEIN: 6 G/DL (ref 6.1–8)
WBC # BLD: 10.1 THOU/MM3 (ref 4.8–10.8)

## 2020-09-28 PROCEDURE — 36415 COLL VENOUS BLD VENIPUNCTURE: CPT

## 2020-09-28 PROCEDURE — 94761 N-INVAS EAR/PLS OXIMETRY MLT: CPT

## 2020-09-28 PROCEDURE — 2100000000 HC CCU R&B

## 2020-09-28 PROCEDURE — 84145 PROCALCITONIN (PCT): CPT

## 2020-09-28 PROCEDURE — 6360000002 HC RX W HCPCS: Performed by: NURSE PRACTITIONER

## 2020-09-28 PROCEDURE — 94640 AIRWAY INHALATION TREATMENT: CPT

## 2020-09-28 PROCEDURE — 2580000003 HC RX 258: Performed by: NURSE PRACTITIONER

## 2020-09-28 PROCEDURE — 80053 COMPREHEN METABOLIC PANEL: CPT

## 2020-09-28 PROCEDURE — 2500000003 HC RX 250 WO HCPCS: Performed by: NURSE PRACTITIONER

## 2020-09-28 PROCEDURE — 6370000000 HC RX 637 (ALT 250 FOR IP): Performed by: HOSPITALIST

## 2020-09-28 PROCEDURE — 94770 HC ETCO2 MONITOR DAILY: CPT

## 2020-09-28 PROCEDURE — 2580000003 HC RX 258: Performed by: HOSPITALIST

## 2020-09-28 PROCEDURE — 6370000000 HC RX 637 (ALT 250 FOR IP): Performed by: INTERNAL MEDICINE

## 2020-09-28 PROCEDURE — 6360000002 HC RX W HCPCS: Performed by: HOSPITALIST

## 2020-09-28 PROCEDURE — 6360000002 HC RX W HCPCS: Performed by: STUDENT IN AN ORGANIZED HEALTH CARE EDUCATION/TRAINING PROGRAM

## 2020-09-28 PROCEDURE — 94003 VENT MGMT INPAT SUBQ DAY: CPT

## 2020-09-28 PROCEDURE — 85025 COMPLETE CBC W/AUTO DIFF WBC: CPT

## 2020-09-28 PROCEDURE — 83605 ASSAY OF LACTIC ACID: CPT

## 2020-09-28 PROCEDURE — 99291 CRITICAL CARE FIRST HOUR: CPT | Performed by: INTERNAL MEDICINE

## 2020-09-28 PROCEDURE — 6370000000 HC RX 637 (ALT 250 FOR IP): Performed by: STUDENT IN AN ORGANIZED HEALTH CARE EDUCATION/TRAINING PROGRAM

## 2020-09-28 PROCEDURE — 82948 REAGENT STRIP/BLOOD GLUCOSE: CPT

## 2020-09-28 PROCEDURE — 6370000000 HC RX 637 (ALT 250 FOR IP): Performed by: NURSE PRACTITIONER

## 2020-09-28 PROCEDURE — 2700000000 HC OXYGEN THERAPY PER DAY

## 2020-09-28 RX ORDER — POTASSIUM CHLORIDE 29.8 MG/ML
20 INJECTION INTRAVENOUS
Status: COMPLETED | OUTPATIENT
Start: 2020-09-28 | End: 2020-09-28

## 2020-09-28 RX ORDER — DEXAMETHASONE SODIUM PHOSPHATE 4 MG/ML
4 INJECTION, SOLUTION INTRA-ARTICULAR; INTRALESIONAL; INTRAMUSCULAR; INTRAVENOUS; SOFT TISSUE DAILY
Status: COMPLETED | OUTPATIENT
Start: 2020-09-28 | End: 2020-10-01

## 2020-09-28 RX ORDER — MULTIVIT/FOLIC ACID/HERBAL 275 400-200/30
30 LIQUID (ML) ORAL DAILY
Status: DISCONTINUED | OUTPATIENT
Start: 2020-09-28 | End: 2020-10-04 | Stop reason: ALTCHOICE

## 2020-09-28 RX ADMIN — CITALOPRAM 30 MG: 20 TABLET, FILM COATED ORAL at 10:10

## 2020-09-28 RX ADMIN — ALLOPURINOL 200 MG: 100 TABLET ORAL at 10:09

## 2020-09-28 RX ADMIN — ENOXAPARIN SODIUM 130 MG: 150 INJECTION SUBCUTANEOUS at 05:29

## 2020-09-28 RX ADMIN — INSULIN GLARGINE 100 UNITS: 100 INJECTION, SOLUTION SUBCUTANEOUS at 10:14

## 2020-09-28 RX ADMIN — Medication 2000 UNITS: at 10:08

## 2020-09-28 RX ADMIN — SENNOSIDES 8.6 MG: 8.6 TABLET, FILM COATED ORAL at 10:08

## 2020-09-28 RX ADMIN — PROPOFOL 35 MCG/KG/MIN: 10 INJECTION, EMULSION INTRAVENOUS at 01:08

## 2020-09-28 RX ADMIN — ASPIRIN 81 MG: 81 TABLET, CHEWABLE ORAL at 10:08

## 2020-09-28 RX ADMIN — ENOXAPARIN SODIUM 130 MG: 150 INJECTION SUBCUTANEOUS at 20:18

## 2020-09-28 RX ADMIN — POLYETHYLENE GLYCOL 3350 17 G: 17 POWDER, FOR SOLUTION ORAL at 10:12

## 2020-09-28 RX ADMIN — GABAPENTIN 800 MG: 400 CAPSULE ORAL at 10:09

## 2020-09-28 RX ADMIN — FAMOTIDINE 20 MG: 10 INJECTION INTRAVENOUS at 21:11

## 2020-09-28 RX ADMIN — SENNOSIDES 8.6 MG: 8.6 TABLET, FILM COATED ORAL at 20:19

## 2020-09-28 RX ADMIN — PROPOFOL 20 MCG/KG/MIN: 10 INJECTION, EMULSION INTRAVENOUS at 05:28

## 2020-09-28 RX ADMIN — DEXAMETHASONE SODIUM PHOSPHATE 4 MG: 4 INJECTION, SOLUTION INTRA-ARTICULAR; INTRALESIONAL; INTRAMUSCULAR; INTRAVENOUS; SOFT TISSUE at 09:57

## 2020-09-28 RX ADMIN — FOLIC ACID 1 MG: 1 TABLET ORAL at 10:09

## 2020-09-28 RX ADMIN — BUSPIRONE HYDROCHLORIDE 10 MG: 10 TABLET ORAL at 10:08

## 2020-09-28 RX ADMIN — PIPERACILLIN AND TAZOBACTAM 3.38 G: 3; .375 INJECTION, POWDER, LYOPHILIZED, FOR SOLUTION INTRAVENOUS at 02:24

## 2020-09-28 RX ADMIN — PROPOFOL 25 MCG/KG/MIN: 10 INJECTION, EMULSION INTRAVENOUS at 17:15

## 2020-09-28 RX ADMIN — Medication 30 ML: at 16:23

## 2020-09-28 RX ADMIN — INSULIN GLARGINE 100 UNITS: 100 INJECTION, SOLUTION SUBCUTANEOUS at 21:11

## 2020-09-28 RX ADMIN — GLYCOPYRROLATE AND FORMOTEROL FUMARATE 2 PUFF: 9; 4.8 AEROSOL, METERED RESPIRATORY (INHALATION) at 09:27

## 2020-09-28 RX ADMIN — ARIPIPRAZOLE 15 MG: 15 TABLET ORAL at 10:09

## 2020-09-28 RX ADMIN — INSULIN LISPRO 3 UNITS: 100 INJECTION, SOLUTION INTRAVENOUS; SUBCUTANEOUS at 16:21

## 2020-09-28 RX ADMIN — PROPOFOL 25 MCG/KG/MIN: 10 INJECTION, EMULSION INTRAVENOUS at 11:38

## 2020-09-28 RX ADMIN — Medication 15 ML: at 10:13

## 2020-09-28 RX ADMIN — INSULIN LISPRO 3 UNITS: 100 INJECTION, SOLUTION INTRAVENOUS; SUBCUTANEOUS at 12:59

## 2020-09-28 RX ADMIN — GLYCOPYRROLATE AND FORMOTEROL FUMARATE 2 PUFF: 9; 4.8 AEROSOL, METERED RESPIRATORY (INHALATION) at 20:11

## 2020-09-28 RX ADMIN — SODIUM CHLORIDE, PRESERVATIVE FREE 10 ML: 5 INJECTION INTRAVENOUS at 10:13

## 2020-09-28 RX ADMIN — FAMOTIDINE 20 MG: 10 INJECTION INTRAVENOUS at 09:57

## 2020-09-28 RX ADMIN — INSULIN LISPRO 6 UNITS: 100 INJECTION, SOLUTION INTRAVENOUS; SUBCUTANEOUS at 20:17

## 2020-09-28 RX ADMIN — POTASSIUM CHLORIDE 20 MEQ: 29.8 INJECTION, SOLUTION INTRAVENOUS at 06:27

## 2020-09-28 RX ADMIN — BUSPIRONE HYDROCHLORIDE 10 MG: 10 TABLET ORAL at 20:19

## 2020-09-28 RX ADMIN — GABAPENTIN 800 MG: 400 CAPSULE ORAL at 20:19

## 2020-09-28 RX ADMIN — POTASSIUM CHLORIDE 20 MEQ: 29.8 INJECTION, SOLUTION INTRAVENOUS at 08:15

## 2020-09-28 RX ADMIN — ATORVASTATIN CALCIUM 40 MG: 40 TABLET, FILM COATED ORAL at 20:19

## 2020-09-28 RX ADMIN — INSULIN LISPRO 3 UNITS: 100 INJECTION, SOLUTION INTRAVENOUS; SUBCUTANEOUS at 03:48

## 2020-09-28 RX ADMIN — Medication 15 ML: at 20:18

## 2020-09-28 RX ADMIN — PIPERACILLIN AND TAZOBACTAM 3.38 G: 3; .375 INJECTION, POWDER, LYOPHILIZED, FOR SOLUTION INTRAVENOUS at 10:12

## 2020-09-28 RX ADMIN — PROPOFOL 25 MCG/KG/MIN: 10 INJECTION, EMULSION INTRAVENOUS at 23:00

## 2020-09-28 RX ADMIN — SODIUM CHLORIDE: 9 INJECTION, SOLUTION INTRAVENOUS at 16:25

## 2020-09-28 RX ADMIN — FUROSEMIDE 40 MG: 40 TABLET ORAL at 10:09

## 2020-09-28 RX ADMIN — SODIUM CHLORIDE, PRESERVATIVE FREE 10 ML: 5 INJECTION INTRAVENOUS at 20:19

## 2020-09-28 ASSESSMENT — PULMONARY FUNCTION TESTS
PIF_VALUE: 25
PIF_VALUE: 26
PIF_VALUE: 26
PIF_VALUE: 22
PIF_VALUE: 31
PIF_VALUE: 26

## 2020-09-28 NOTE — PLAN OF CARE
Problem: Nutrition  Goal: Optimal nutrition therapy  9/28/2020 1133 by Deep Camejo RD, KAELYN  Outcome: Ongoing   Nutrition Problem #1: Inadequate oral intake  Intervention: Food and/or Nutrient Delivery: Continue NPO, Continue Current Tube Feeding, Vitamin Supplement  Nutritional Goals: Patient will tolerate EN at 10-20 ml/hr during acute phase

## 2020-09-28 NOTE — PLAN OF CARE
Emanuel Minaya 58  PHYSICAL THERAPY MISSED TREATMENT NOTE  [facility]    Date: 2020  Patient Name: Timo Gracia        MRN: [id number]   : 1970  (44 y.o.)  Gender: male                REASON FOR MISSED TREATMENT:  Pt continues to be intubated. RN reports pt is not an early mobility candidate. PT signing off. Please send new PT orders when appropriate. Andrew Cazares.  Yaz Rivero, [place] 8

## 2020-09-28 NOTE — PLAN OF CARE
Problem: Falls - Risk of:  Goal: Absence of physical injury  9/28/2020 1531 by Mckayla Harkins RN  Outcome: Met This Shift     Problem: Skin Integrity:  Goal: Will show no infection signs and symptoms  Description: Will show no infection signs and symptoms  9/28/2020 1531 by Mckayla Harkins RN  Outcome: Met This Shift     Problem: Skin Integrity:  Goal: Absence of new skin breakdown  Description: Absence of new skin breakdown  9/28/2020 1531 by Mckayla Harkins RN  Outcome: Met This Shift     Problem: Urinary Retention:  Goal: Urinary elimination within specified parameters  Description: Urinary elimination within specified parameters  9/28/2020 1531 by Mckayla Harkins RN  Outcome: Met This Shift     Problem: Falls - Risk of:  Goal: Will remain free from falls  Description: Will remain free from falls  9/28/2020 1531 by Mckayla Harkins RN  Outcome: Ongoing  Note: Cont with pt safety precautions like side rails up x4, bed alarm on, up with assist only when pt is able.      Problem: Nutrition  Goal: Optimal nutrition therapy  9/28/2020 1531 by Mckayla Harkins RN  Outcome: Ongoing  Note: Tolerates tubefeeds at current rate- will increase as ordered per dietary     Problem: Discharge Planning:  Goal: Discharged to appropriate level of care  Description: Discharged to appropriate level of care  9/28/2020 1531 by Kayode Hwang RN  Outcome: Not Met This Shift  Note: Pt remains in ICU on ventilator- he is not ready to be discharged at this time     Problem: Urinary Retention:  Goal: Able to perform urinary catheter care  Description: Able to perform urinary catheter care  Outcome: Not Met This Shift     Problem: Urinary Retention:  Goal: Ability to reestablish a normal urinary elimination pattern will improve - after catheter removal  Description: Ability to reestablish a normal urinary elimination pattern will improve - after catheter removal  Outcome: Not Met This Shift  Note:

## 2020-09-28 NOTE — PLAN OF CARE
Problem: MECHANICAL VENTILATION  Goal: Normal spontaneous ventilation  Outcome: Ongoing  Note: Pt is on Spont breathing trial at this time and tolerating well  14/6, 35% fio2

## 2020-09-28 NOTE — PLAN OF CARE
Problem: Falls - Risk of:  Goal: Will remain free from falls  Description: Will remain free from falls  Outcome: Ongoing  Note: Side rails x4 in place, bed alarm is activated, hourly rounding in effect, fall risk armband in place, pt. educated on importance of seeking assistance before ambulating. Problem: Falls - Risk of:  Goal: Absence of physical injury  Outcome: Ongoing  Note: Side rails x4 in place, bed alarm is activated, hourly rounding in effect, fall risk armband in place, pt. educated on importance of seeking assistance before ambulating. Problem: Skin Integrity:  Goal: Will show no infection signs and symptoms  Description: Will show no infection signs and symptoms  Outcome: Ongoing  Note: Pt is being turned every two hours to help prevent skin breakdown      Problem: Skin Integrity:  Goal: Absence of new skin breakdown  Description: Absence of new skin breakdown  Outcome: Ongoing  Note: There is no current skin breakdown      Problem: Discharge Planning:  Goal: Discharged to appropriate level of care  Description: Discharged to appropriate level of care  Outcome: Ongoing  Note: Discharge plan reviewed with patient. Patient continues to actively participate in current discharge plan. Case management/ following patient. Problem: Pain:  Goal: Pain level will decrease  Description: Pain level will decrease  Outcome: Ongoing  Note: Patient at risk for increase in pain level d/t respiratory status. Patient's pain goal is 0. Current interventions in place to manage acute and/or chronic pain level include repositioning and meds. Patient accepting pain interventions without difficulty. Problem: Urinary Retention:  Goal: Urinary elimination within specified parameters  Description: Urinary elimination within specified parameters  Outcome: Ongoing  Note: Pt has mason catheter in place and is receiving mason care every shift.  Intake and output is being measured every 8 hours closely.        Problem: Nutrition  Goal: Optimal nutrition therapy  Outcome: Ongoing  Note: Pt is receiving tube feed at goal rate of 20ml/hr

## 2020-09-28 NOTE — PLAN OF CARE
Patient continues on ventilator; tolerating well  will continue to monitor and wean patient as patient tolerates

## 2020-09-28 NOTE — PLAN OF CARE
Problem: MECHANICAL VENTILATION  Goal: Normal spontaneous ventilation  9/28/2020 1323 by Akira Mera RCP  Outcome: Not Met This Shift   Patient remains on the ventilator with lung protective strategies in place. Will continue to monitor and will wean when appropriate.

## 2020-09-28 NOTE — PROGRESS NOTES
CRITICAL CARE PROGRESS NOTE      Patient:  Brittny Alejandre    Unit/Bed:4B-04/004-A  YOB: 1970  MRN: [id number]   PCP: Qiana Almeida MD  Date of Admission: 9/23/2020  Chief Complaint:-COVID-19 infection    Assessment and Plan:    1. Acute on chronic hypoxic respiratory failure: Due to Kleber Sullivan was recently hospitalized for COVID-19 infection.  It was noted per EHR patient was not wearing 6 L nasal cannula as directed.  Patient became hypoxic and respiratory distress 9/23/2020, resulting in intubation. Bronchoscopy on 9/27/2020 demonstrated no mucous plugging.  Patient remains on ventilator PCV+, measured rate 16, PEEP 6, pressure support 20. Propofol 25 Mcg/kg/min for sedation while on mechanical ventilation. 2. Septic/hypovolemic shock: Due to severe pneumonia and volume depletion.  Patient no longer requiring Levophed to maintain MAP > 65.  Patient currently receiving 0.9% normal saline at 100 mL/ hr  patient received 25g albumin.  We want to keep patient on the dry side so hyaluronic gel does not accumulate within alveoli.  In the setting of COVID-19 infection/ARDS fluid will have an increase tendency to accumulate and reside in alveoli, with release of hyaluronic acid a hyaluronic gel can form further worsening alveoli surface area.   3. Severe pneumonia: Due to COVID-19 infection vs superimposed bacterial pneumonia. Chest x-ray demonstrating worsening bilateral pneumonia. Continue Zosyn.  Procalcitonin trending downwards 0.17, procalcitonin continues to decrease greater than 50% over 48 hours indicating antibiotic therapy is effective.  Respiratory PCR shows no identified bacterial infections or superimposed infections.  Current hospitalization patient testing negative for COVID-19.  We will continue to treat for COVID-19, patient was previously treated with danazol, however due to patient being intubated and sedated danazol is unlikely to be given as it cannot be crushed.   Patient receiving 2 units of convalescent plasma therapy.  Continue Decadron  4 mg daily for total of 10 doses ( day 6/10).    Decadron is only therapy noted to decrease mortality risk and severe COVID infections.  Continue aspirin 81 mg daily for treatment and prevention microthrombosis which is noted to be common in COVID/ARDS conditions. 4. 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  Progressive bilateral infiltrates.  Continue with mechanical ventilator support.  Continue with lung protection strategies and permissive hypercapnia.  Patient is too large for pronation therapy. 5. Uncontrolled type 2 diabetes: Noncompliant, last A1c 9.9%. beta hydroxybutyrate 22.95.  Patient's blood sugars seem to be significantly improving.   Patient on tube feeds will initiate 200 mL free water flushes 3 times daily. Stop insulin drip and will initiate lantus 100 units BID as well as high-dose sliding scale four times a day.  Continue gabapentin 800 mg twice daily. 6. History of BPH with associated urinary retention: Patient had about 1 L of urine retention and Dimas catheter had been inserted.  Patient takes Flomax at home, unable to continue use of Flomax since it cannot be crushed.  We will continue to follow culture  7. Chronic diastolic heart failure: Echocardiogram on 10/10/2019 demonstrated EF 55 to 60%.  Patient previously reported increased lower extremity edema prior admission 1 week ago.  proBNP negative.  Patient weight increased 4 pounds since last admission.  Patient currently has bilateral lower extremity edema however intravascularly appears dry. Strict I's and O's and daily weights.  Echo 9/24/20 was significant for mild to moderate aortic stenosis with an ejection fraction 60%. Continue Lasix 40 mg daily   2.  History of paroxysmal atrial fibrillation: Rate controlled - Cardizem 60mg BID.  Patient was previously on Eliquis however due to weight being greater than 120 kg. Lovenox 120 mg subcutaneous BID is indicated as treatment of choice for anticoagulation;    8. Morbid obesity: BMI 43.33.  Will educate patient on importance of weight loss when appropriate  9. Obstructive sleep apnea: We will encourage patient to wear home CPAP after extubated  10. Vitamin D deficiency: Continue vitamin D supplementation  11. History of hyperlipidemia: Continue Lipitor 40 mg nightly  12. History of gout: Continue allopurinol  13. History of essential hypertension:   Hold Norvasc and hydralazine as patient is currently intubated and requiring pressor therapy  14. History of anxiety/depression: Continue BuSpar, Celexa and Abilify     INITIAL H AND P AND ICU COURSE:  58-year-old male presented to the [facility] emergency department complaining of progressive worsening shortness of breath for the last 2 days.  Patient denies chest pain, fever, chills, diarrhea, urinary symptoms.  Patient admits to bilateral lower extremity committee swelling.  Patient admits that he is unsure if he has been taking his medication as directed from prior discharge.     Patient has a significant past medical history for obstructive sleep apnea which uses CPAP machine, obesity, anxiety, depression, hypertension, hyperlipidemia, rheumatoid arthritis, type 2 diabetes and atrial fibrillation.  Patient was diagnosed with COVID19  pneumonia and admitted to [facility] last month. Williams Awad was treated at that time with Decadron, Remdesivir and azithromycin patient was discharged on 8/12/2020.  9/6/2020 patient was brought back to the emergency room department via EMS because he was found to be hypoxic at [address] was treated with Decadron 6 mg for 3 days and discharged 9/15/20 to home apartment with home health.  Prior to discharge on 9/15/2020 home oxygen evaluation was performed prior to discharge on 9/15/2020. Williams Awad was requiring 6 L of oxygen with activity however did not require oxygen at rest.     On arrival to [facility] patient states he is having increasing shortness of breath.  However denies chest pain, nausea, vomiting, diarrhea, fatigue, fever, chills.        Past Medical History: Vitamin D deficiency, obstructive sleep apnea uses CPAP at night, nonalcoholic steatohepatitis, morbid obesity, major depression, hypogonadism, essential hypertension, hyperlipidemia, history of osteomyelitis, history of alcohol abuse, heart failure with preserved ejection fraction, GERD, type 2 diabetes, chronic gout, CAD, BPH, atrial fibrillation, rheumatoid arthritis  Family History: Mother-heart disease with history of MI at 48years of age, .  Paternal aunt -lung cancer  Social History: History of alcohol abuse, lifetime non-smoker, denies substance abuse, , resides at [place] [address]  Unable to obtain secondary to patient intubated and sedated    Scheduled Meds:   magnesium replacement protocol   Other RX Placeholder    phosphorus replacement protocol   Other RX Placeholder    calcium replacement protocol   Other RX Placeholder    potassium replacement protocol   Other RX Placeholder    potassium chloride  20 mEq Intravenous every 2 hours    dexamethasone  4 mg Intravenous Daily    insulin lispro  0-18 Units Subcutaneous Q4H    insulin glargine  100 Units Subcutaneous BID    polyethylene glycol  17 g Oral Every Other Day    piperacillin-tazobactam  3.375 g Intravenous Q8H    allopurinol  200 mg Oral Daily    [Held by provider] amLODIPine  5 mg Oral Daily    ARIPiprazole  15 mg Oral Daily    aspirin  81 mg Oral Daily    atorvastatin  40 mg Oral Nightly    busPIRone  10 mg Oral BID    citalopram  30 mg Oral Daily    Vitamin D  2,000 Units Oral Daily    dilTIAZem  60 mg Oral BID    folic acid  1 mg Oral Daily    furosemide  40 mg Oral Daily    gabapentin  800 mg Oral BID    [Held by provider] hydrALAZINE  50 mg Oral BID    enoxaparin  1 mg/kg Subcutaneous Q12H    [Held by provider] tamsulosin  0.4 mg Oral Nightly    sodium chloride flush  10 mL Intravenous 2 times per day    chlorhexidine  15 mL Mouth/Throat BID    famotidine (PEPCID) injection  20 mg Intravenous BID    glycopyrrolate-formoterol  2 puff Inhalation BID    therapeutic multivitamin-minerals  1 tablet Oral Daily    senna  1 tablet Oral BID     Continuous Infusions:   norepinephrine Stopped (09/25/20 0002)    sodium chloride 100 mL/hr at 09/27/20 1004    propofol 25 mcg/kg/min (09/28/20 0533)    dextrose         PHYSICAL EXAMINATION:  T: 98.0.  P: 48. RR: 20. B/P: 120/68. FiO2: 35%. O2 Sat: 98%. I/O:  1215/1300  Body mass index is 43.33 kg/m². Mode: PVC+ Measured Rate: 19 Pressure Support: 14 PEEP: 6  GCS:   11  General:   Acutely and chronically ill male is currently critically ill on mechanical ventilation and sedated  HEENT:  normocephalic and atraumatic. No scleral icterus. PERR  Neck: supple. No Thyromegaly. Lungs: Diminished breath sounds bilaterally clear to auscultation. No retractions  Cardiac: Bradycardic rate with grade 2 out of 6 murmur. Regular rhythm. No JVD. Abdomen: soft. Nontender. Bowel sounds present  Extremities:  No clubbing or cyanosis. Trace bilateral edema  Vasculature: capillary refill < 3 seconds. Palpable dorsalis pedis pulses. Skin:  warm and dry. Psych: Unable to assess secondary to patient being sedated on mechanical ventilation  Lymph:  No supraclavicular adenopathy. Neurologic:  No focal deficit. No seizures. Data: (All radiographs, tracings, PFTs, and imaging are personally viewed and interpreted unless otherwise noted).     Sodium 145, potassium 3.4, chloride 103, CO2 30, BUN 18, creatinine 0.7, lactic acid 2.0, calcium 8.7   WBC 10.1, hemoglobin 11.3, hematocrit 34.6, platelets 726   Albumin 2.9, alk phos 62, ALT 40, AST 20, bilirubin 0.4, total protein 6.0   Procalcitonin 0.17   Telemetry shows sinus bradycardia   Chest x-ray 9/27/2020 reports:

## 2020-09-28 NOTE — PROGRESS NOTES
Emanuel Minaya 58  OCCUPATIONAL THERAPY MISSED TREATMENT NOTE  [facility] CVICU 4B  4B-04/004-A      Date: 2020  Patient Name: Miguel Anand        CSN: [id number]   : 1970  (44 y.o.)  Gender: male   Referring Practitioner: SANDRA Aponte CNP  Diagnosis: COVID 19         REASON FOR MISSED TREATMENT: Pt continues to be intubated. RN reports pt is not an early mobility candidate. OT signing off. Please send new OT orders when appropriate.

## 2020-09-29 LAB
ALBUMIN SERPL-MCNC: 3 G/DL (ref 3.5–5.1)
ALP BLD-CCNC: 63 U/L (ref 38–126)
ALT SERPL-CCNC: 54 U/L (ref 11–66)
ANION GAP SERPL CALCULATED.3IONS-SCNC: 9 MEQ/L (ref 8–16)
AST SERPL-CCNC: 32 U/L (ref 5–40)
BASOPHILS # BLD: 0.5 %
BASOPHILS ABSOLUTE: 0 THOU/MM3 (ref 0–0.1)
BILIRUB SERPL-MCNC: 0.4 MG/DL (ref 0.3–1.2)
BLOOD CULTURE, ROUTINE: NORMAL
BLOOD CULTURE, ROUTINE: NORMAL
BUN BLDV-MCNC: 22 MG/DL (ref 7–22)
CALCIUM SERPL-MCNC: 8.9 MG/DL (ref 8.5–10.5)
CHLORIDE BLD-SCNC: 104 MEQ/L (ref 98–111)
CO2: 31 MEQ/L (ref 23–33)
CREAT SERPL-MCNC: 0.9 MG/DL (ref 0.4–1.2)
EOSINOPHIL # BLD: 1.6 %
EOSINOPHILS ABSOLUTE: 0.2 THOU/MM3 (ref 0–0.4)
ERYTHROCYTE [DISTWIDTH] IN BLOOD BY AUTOMATED COUNT: 16.7 % (ref 11.5–14.5)
ERYTHROCYTE [DISTWIDTH] IN BLOOD BY AUTOMATED COUNT: 55.8 FL (ref 35–45)
GFR SERPL CREATININE-BSD FRML MDRD: > 90 ML/MIN/1.73M2
GLUCOSE BLD-MCNC: 102 MG/DL (ref 70–108)
GLUCOSE BLD-MCNC: 109 MG/DL (ref 70–108)
GLUCOSE BLD-MCNC: 111 MG/DL (ref 70–108)
GLUCOSE BLD-MCNC: 122 MG/DL (ref 70–108)
GLUCOSE BLD-MCNC: 133 MG/DL (ref 70–108)
GLUCOSE BLD-MCNC: 184 MG/DL (ref 70–108)
GLUCOSE BLD-MCNC: 97 MG/DL (ref 70–108)
HCT VFR BLD CALC: 36.9 % (ref 42–52)
HEMOGLOBIN: 11.4 GM/DL (ref 14–18)
IMMATURE GRANS (ABS): 0.61 THOU/MM3 (ref 0–0.07)
IMMATURE GRANULOCYTES: 6.3 %
LACTIC ACID: 1.2 MMOL/L (ref 0.5–2.2)
LYMPHOCYTES # BLD: 15.4 %
LYMPHOCYTES ABSOLUTE: 1.5 THOU/MM3 (ref 1–4.8)
MCH RBC QN AUTO: 28.5 PG (ref 26–33)
MCHC RBC AUTO-ENTMCNC: 30.9 GM/DL (ref 32.2–35.5)
MCV RBC AUTO: 92.3 FL (ref 80–94)
MONOCYTES # BLD: 7 %
MONOCYTES ABSOLUTE: 0.7 THOU/MM3 (ref 0.4–1.3)
NUCLEATED RED BLOOD CELLS: 1 /100 WBC
PLATELET # BLD: 291 THOU/MM3 (ref 130–400)
PMV BLD AUTO: 11.5 FL (ref 9.4–12.4)
POTASSIUM SERPL-SCNC: 3.7 MEQ/L (ref 3.5–5.2)
PROCALCITONIN: 0.17 NG/ML (ref 0.01–0.09)
RBC # BLD: 4 MILL/MM3 (ref 4.7–6.1)
SEG NEUTROPHILS: 69.2 %
SEGMENTED NEUTROPHILS ABSOLUTE COUNT: 6.8 THOU/MM3 (ref 1.8–7.7)
SODIUM BLD-SCNC: 144 MEQ/L (ref 135–145)
TOTAL PROTEIN: 5.9 G/DL (ref 6.1–8)
WBC # BLD: 9.8 THOU/MM3 (ref 4.8–10.8)

## 2020-09-29 PROCEDURE — 6370000000 HC RX 637 (ALT 250 FOR IP): Performed by: NURSE PRACTITIONER

## 2020-09-29 PROCEDURE — 94770 HC ETCO2 MONITOR DAILY: CPT

## 2020-09-29 PROCEDURE — 84145 PROCALCITONIN (PCT): CPT

## 2020-09-29 PROCEDURE — 85025 COMPLETE CBC W/AUTO DIFF WBC: CPT

## 2020-09-29 PROCEDURE — 6360000002 HC RX W HCPCS: Performed by: HOSPITALIST

## 2020-09-29 PROCEDURE — 94761 N-INVAS EAR/PLS OXIMETRY MLT: CPT

## 2020-09-29 PROCEDURE — 99291 CRITICAL CARE FIRST HOUR: CPT | Performed by: INTERNAL MEDICINE

## 2020-09-29 PROCEDURE — 6370000000 HC RX 637 (ALT 250 FOR IP): Performed by: STUDENT IN AN ORGANIZED HEALTH CARE EDUCATION/TRAINING PROGRAM

## 2020-09-29 PROCEDURE — 2500000003 HC RX 250 WO HCPCS: Performed by: NURSE PRACTITIONER

## 2020-09-29 PROCEDURE — 80053 COMPREHEN METABOLIC PANEL: CPT

## 2020-09-29 PROCEDURE — 6360000002 HC RX W HCPCS: Performed by: NURSE PRACTITIONER

## 2020-09-29 PROCEDURE — 2580000003 HC RX 258: Performed by: NURSE PRACTITIONER

## 2020-09-29 PROCEDURE — 6370000000 HC RX 637 (ALT 250 FOR IP): Performed by: INTERNAL MEDICINE

## 2020-09-29 PROCEDURE — 2100000000 HC CCU R&B

## 2020-09-29 PROCEDURE — 6370000000 HC RX 637 (ALT 250 FOR IP): Performed by: HOSPITALIST

## 2020-09-29 PROCEDURE — 6360000002 HC RX W HCPCS: Performed by: INTERNAL MEDICINE

## 2020-09-29 PROCEDURE — 2580000003 HC RX 258: Performed by: HOSPITALIST

## 2020-09-29 PROCEDURE — 6360000002 HC RX W HCPCS: Performed by: STUDENT IN AN ORGANIZED HEALTH CARE EDUCATION/TRAINING PROGRAM

## 2020-09-29 PROCEDURE — 82948 REAGENT STRIP/BLOOD GLUCOSE: CPT

## 2020-09-29 PROCEDURE — 2700000000 HC OXYGEN THERAPY PER DAY

## 2020-09-29 PROCEDURE — 83605 ASSAY OF LACTIC ACID: CPT

## 2020-09-29 PROCEDURE — 2580000003 HC RX 258: Performed by: INTERNAL MEDICINE

## 2020-09-29 PROCEDURE — 94640 AIRWAY INHALATION TREATMENT: CPT

## 2020-09-29 PROCEDURE — 36415 COLL VENOUS BLD VENIPUNCTURE: CPT

## 2020-09-29 PROCEDURE — 94003 VENT MGMT INPAT SUBQ DAY: CPT

## 2020-09-29 RX ORDER — INSULIN GLARGINE 100 [IU]/ML
100 INJECTION, SOLUTION SUBCUTANEOUS NIGHTLY
Status: DISCONTINUED | OUTPATIENT
Start: 2020-09-29 | End: 2020-10-03

## 2020-09-29 RX ORDER — INSULIN GLARGINE 100 [IU]/ML
50 INJECTION, SOLUTION SUBCUTANEOUS EVERY MORNING
Status: DISCONTINUED | OUTPATIENT
Start: 2020-09-29 | End: 2020-10-02

## 2020-09-29 RX ADMIN — FAMOTIDINE 20 MG: 10 INJECTION INTRAVENOUS at 10:00

## 2020-09-29 RX ADMIN — FOLIC ACID 1 MG: 1 TABLET ORAL at 11:21

## 2020-09-29 RX ADMIN — ENOXAPARIN SODIUM 130 MG: 150 INJECTION SUBCUTANEOUS at 10:01

## 2020-09-29 RX ADMIN — PROPOFOL 25.01 MCG/KG/MIN: 10 INJECTION, EMULSION INTRAVENOUS at 14:31

## 2020-09-29 RX ADMIN — GABAPENTIN 800 MG: 400 CAPSULE ORAL at 21:21

## 2020-09-29 RX ADMIN — GABAPENTIN 800 MG: 400 CAPSULE ORAL at 09:59

## 2020-09-29 RX ADMIN — VANCOMYCIN HYDROCHLORIDE 1500 MG: 5 INJECTION, POWDER, LYOPHILIZED, FOR SOLUTION INTRAVENOUS at 23:58

## 2020-09-29 RX ADMIN — ENOXAPARIN SODIUM 130 MG: 150 INJECTION SUBCUTANEOUS at 22:20

## 2020-09-29 RX ADMIN — Medication 2000 UNITS: at 11:20

## 2020-09-29 RX ADMIN — ALLOPURINOL 200 MG: 100 TABLET ORAL at 11:21

## 2020-09-29 RX ADMIN — FUROSEMIDE 40 MG: 40 TABLET ORAL at 11:21

## 2020-09-29 RX ADMIN — SODIUM CHLORIDE: 9 INJECTION, SOLUTION INTRAVENOUS at 23:59

## 2020-09-29 RX ADMIN — PROPOFOL 25 MCG/KG/MIN: 10 INJECTION, EMULSION INTRAVENOUS at 23:59

## 2020-09-29 RX ADMIN — Medication 30 ML: at 11:19

## 2020-09-29 RX ADMIN — ATORVASTATIN CALCIUM 40 MG: 40 TABLET, FILM COATED ORAL at 21:21

## 2020-09-29 RX ADMIN — INSULIN GLARGINE 100 UNITS: 100 INJECTION, SOLUTION SUBCUTANEOUS at 21:16

## 2020-09-29 RX ADMIN — SODIUM CHLORIDE, PRESERVATIVE FREE 10 ML: 5 INJECTION INTRAVENOUS at 21:22

## 2020-09-29 RX ADMIN — ARIPIPRAZOLE 15 MG: 15 TABLET ORAL at 12:24

## 2020-09-29 RX ADMIN — BUSPIRONE HYDROCHLORIDE 10 MG: 10 TABLET ORAL at 22:20

## 2020-09-29 RX ADMIN — ASPIRIN 81 MG: 81 TABLET, CHEWABLE ORAL at 09:59

## 2020-09-29 RX ADMIN — INSULIN GLARGINE 50 UNITS: 100 INJECTION, SOLUTION SUBCUTANEOUS at 12:46

## 2020-09-29 RX ADMIN — SODIUM CHLORIDE, PRESERVATIVE FREE 10 ML: 5 INJECTION INTRAVENOUS at 10:00

## 2020-09-29 RX ADMIN — Medication 15 ML: at 10:00

## 2020-09-29 RX ADMIN — SODIUM CHLORIDE: 9 INJECTION, SOLUTION INTRAVENOUS at 01:10

## 2020-09-29 RX ADMIN — VANCOMYCIN HYDROCHLORIDE 1500 MG: 5 INJECTION, POWDER, LYOPHILIZED, FOR SOLUTION INTRAVENOUS at 12:25

## 2020-09-29 RX ADMIN — INSULIN LISPRO 3 UNITS: 100 INJECTION, SOLUTION INTRAVENOUS; SUBCUTANEOUS at 16:56

## 2020-09-29 RX ADMIN — SODIUM CHLORIDE: 9 INJECTION, SOLUTION INTRAVENOUS at 14:31

## 2020-09-29 RX ADMIN — DEXAMETHASONE SODIUM PHOSPHATE 4 MG: 4 INJECTION, SOLUTION INTRA-ARTICULAR; INTRALESIONAL; INTRAMUSCULAR; INTRAVENOUS; SOFT TISSUE at 10:00

## 2020-09-29 RX ADMIN — GLYCOPYRROLATE AND FORMOTEROL FUMARATE 2 PUFF: 9; 4.8 AEROSOL, METERED RESPIRATORY (INHALATION) at 20:36

## 2020-09-29 RX ADMIN — PROPOFOL 25 MCG/KG/MIN: 10 INJECTION, EMULSION INTRAVENOUS at 03:17

## 2020-09-29 RX ADMIN — BUSPIRONE HYDROCHLORIDE 10 MG: 10 TABLET ORAL at 09:59

## 2020-09-29 RX ADMIN — CITALOPRAM 30 MG: 20 TABLET, FILM COATED ORAL at 11:21

## 2020-09-29 RX ADMIN — Medication 15 ML: at 21:22

## 2020-09-29 RX ADMIN — GLYCOPYRROLATE AND FORMOTEROL FUMARATE 2 PUFF: 9; 4.8 AEROSOL, METERED RESPIRATORY (INHALATION) at 08:08

## 2020-09-29 RX ADMIN — FAMOTIDINE 20 MG: 10 INJECTION INTRAVENOUS at 21:21

## 2020-09-29 ASSESSMENT — PULMONARY FUNCTION TESTS
PIF_VALUE: 24
PIF_VALUE: 18
PIF_VALUE: 25
PIF_VALUE: 23
PIF_VALUE: 19
PIF_VALUE: 27
PIF_VALUE: 21
PIF_VALUE: 17
PIF_VALUE: 23

## 2020-09-29 NOTE — PROGRESS NOTES
Present to pt room for skin assessment. Pt resting in bed, rolled to left side for assessment. Pt noted to have large loose bowel movement. Cleaned up and new chux pad placed. Charge nurse questions flexiseal to be inserted to primary. May consider that at some point today. Pt has mason catheter. Bilateral buttocks skin dry and intact. Zinc barrier cream applied for protection from fecal incontinence. Pt had silicone bordered foam dressing that was removed. No open areas or lesions noted. Bilateral heels are blanching without redness. Wedges placed under right side and bilateral heels offloaded with pillow support. Pt on jennifer TUCKER support surface. Bed in low, primary nurse in room. Bilateral buttocks skin dry and intact without skin breakdown    Thank you for allowing us to participate in the care of your patient. TIME   Wound/ostomy individual minutes  Time In: 1020  Time Out: 1040  Minutes: 20  Time does not include documentation.

## 2020-09-29 NOTE — PROGRESS NOTES
Pharmacy Note  Vancomycin Consult    Haydee Smith is a 44 y.o. male started on Vancomycin for pneumonia; consult received from Dr. Chano Jurado to manage therapy. Patient Active Problem List   Diagnosis    Chronic diastolic congestive heart failure (HCC)    Atrial fibrillation (HCC)    BPH (benign prostatic hyperplasia)    Carpal tunnel syndrome on right    Chronic gout    DM2 (diabetes mellitus, type 2) (HCC)    Heart failure with preserved ejection fraction (HCC)    History of alcohol abuse    History of osteomyelitis    Hypertension, essential    Hypogonadism, male    Major depression    Morbid obesity (HCC)    DURAN (nonalcoholic steatohepatitis)    KIARA (obstructive sleep apnea)    Vitamin D deficiency    Normocytic anemia    Physical deconditioning    Mood disorder (HCC)    Hallucinations    GERD (gastroesophageal reflux disease)    Wound of buttock    Chest wall pain with tenderness    Primary osteoarthritis of left hip    COVID-19    COVID-19 virus infection    Acute respiratory failure with hypoxia ([facility])     Allergies:  Pcn [penicillins]     Temp max: 100    Recent Labs     09/28/20  0340 09/29/20  0432   BUN 18 22     Recent Labs     09/28/20  0340 09/29/20  0432   CREATININE 0.7 0.9     Recent Labs     09/28/20  0340 09/29/20  0432   WBC 10.1 9.8     Intake/Output Summary (Last 24 hours) at 9/29/2020 1035  Last data filed at 9/29/2020 4539  Gross per 24 hour   Intake 3703 ml   Output 3900 ml   Net -197 ml     Culture Date      Source                       Results  09/23/2020          respiratory                 MRSA    Ht Readings from Last 1 Encounters:   09/23/20 5' 8\" (1.727 m)        Wt Readings from Last 1 Encounters:   09/29/20 296 lb 8.3 oz (134.5 kg)       Body mass index is 45.09 kg/m². Estimated Creatinine Clearance: 130 mL/min (based on SCr of 0.9 mg/dL). Goal Trough Level: ~15 mcg/mL    Assessment/Plan:  Will initiate Vancomycin 1500 mg IV every 12 hours.   Timing of trough level will be determined based on culture results, renal function, and clinical response. Thank you for the consult. Will continue to follow.     Noman MarroquinD, BCPS, BCCCP  9/29/2020 10:44 AM

## 2020-09-29 NOTE — PROGRESS NOTES
CRITICAL CARE PROGRESS NOTE      Patient:  Robby Chavarria    Unit/Bed:4B-04/004-A  YOB: 1970  MRN: [id number]   PCP: Yoko Reddy MD  Date of Admission: 9/23/2020  Chief Complaint:-COVID-19 infection    Assessment and Plan:    1. Acute on chronic hypoxic respiratory failure: Due to Seema Goran was recently hospitalized for COVID-19 infection.  It was noted per EHR patient was not wearing 6 L nasal cannula as directed.  Patient became hypoxic and respiratory distress 9/23/2020, resulting in intubation. Bronchoscopy on 9/27/2020 demonstrated no mucous plugging. Patient is currently on  spontaneous breathing trial with pressure support of 10, PEEP 6 and FiO2 of 30%. Patient has been tolerating this for greater than 2 hours. Will consider extubation today. 2. Septic/hypovolemic shock: Due to severe pneumonia and volume depletion.  Patient no longer requiring Levophed to maintain MAP > 65.  Patient currently receiving 0.9% normal saline at 100 mL/ hr  patient received 25g albumin.  We want to keep patient on the dry side so hyaluronic gel does not accumulate within alveoli.  In the setting of COVID-19 infection/ARDS fluid will have an increase tendency to accumulate and reside in alveoli, with release of hyaluronic acid a hyaluronic gel can form further worsening alveoli surface area.   3. Severe pneumonia: Due to COVID-19 infection with superinfection with MRSA pneumonia. Chest x-ray demonstrating worsening bilateral pneumonia. Procalcitonin trending downwards 0.17, procalcitonin continues to decrease greater than 50% over 48 hours indicating antibiotic therapy is effective.  Respiratory PCR shows no identified bacterial infections or superimposed infections.  However, sputum culture growing MRSA.   Current hospitalization patient testing negative for COVID-19.  We will continue to treat for COVID-19, patient was previously treated with danazol, however due to patient being intubated and sedated danazol is unlikely to be given as it cannot be crushed.   Patient s/p 2 units of convalescent plasma therapy.  Continue Decadron  4 mg daily for total of 10 doses ( day 7/10).    Decadron is only therapy noted to decrease mortality risk and severe COVID infections.  Continue aspirin 81 mg daily for treatment and prevention microthrombosis which is noted to be common in COVID/ARDS conditions. Bronchial lavage sent for CMV. Adding vancomycin for MRSA growth in sputum. Day 1/8 vancomycin. 4. ARDS:  Secondary to complications of [id number].  Progressive bilateral infiltrates.  Continue with mechanical ventilator support.  Continue with lung protection strategies and permissive hypercapnia.  Patient is too large for pronation therapy. 5. Uncontrolled type 2 diabetes: Noncompliant, last A1c 9.9%. beta hydroxybutyrate 22.95.  Patient's blood sugars seem to be significantly improving.   Patient on tube feeds will initiate 200 mL free water flushes 3 times daily. Stop insulin drip and will initiate lantus 100 units BID as well as high-dose sliding scale four times a day.  Continue gabapentin 800 mg twice daily. 6. History of BPH with associated urinary retention: Patient had about 1 L of urine retention and Dimas catheter had been inserted.  Patient takes Flomax at home, unable to continue use of Flomax since it cannot be crushed.  We will continue to follow culture  7. Chronic diastolic heart failure: Echocardiogram on 10/10/2019 demonstrated EF 55 to 60%.  Patient previously reported increased lower extremity edema prior admission 1 week ago.  proBNP negative.  Patient weight increased 4 pounds since last admission.  Patient currently has bilateral lower extremity edema however intravascularly appears dry. Strict I's and O's and daily weights.  Echo 9/24/20 was significant for mild to moderate aortic stenosis with an ejection fraction 60%. Continue Lasix 40 mg daily   2.  History of paroxysmal atrial fibrillation: Rate controlled - Cardizem 60mg BID.  Patient was previously on Eliquis however due to weight being greater than 120 kg. Lovenox 120 mg subcutaneous BID is indicated as treatment of choice for anticoagulation;    8. Morbid obesity: BMI 43.33.  Will educate patient on importance of weight loss when appropriate  9. Obstructive sleep apnea: We will encourage patient to wear home CPAP after extubated  10. Vitamin D deficiency: Continue vitamin D supplementation  11. History of hyperlipidemia: Continue Lipitor 40 mg nightly  12. History of gout: Continue allopurinol  13. History of essential hypertension:   Hold Norvasc and hydralazine as patient is currently intubated and requiring pressor therapy  14. History of anxiety/depression: Continue BuSpar, Celexa and Abilify     INITIAL H AND P AND ICU COURSE:  78-year-old male presented to the [facility] emergency department complaining of progressive worsening shortness of breath for the last 2 days.  Patient denies chest pain, fever, chills, diarrhea, urinary symptoms.  Patient admits to bilateral lower extremity committee swelling.  Patient admits that he is unsure if he has been taking his medication as directed from prior discharge.     Patient has a significant past medical history for obstructive sleep apnea which uses CPAP machine, obesity, anxiety, depression, hypertension, hyperlipidemia, rheumatoid arthritis, type 2 diabetes and atrial fibrillation.  Patient was diagnosed with COVID19  pneumonia and admitted to [facility] last month. Shabbir Lee was treated at that time with Decadron, Remdesivir and azithromycin patient was discharged on 8/12/2020.  9/6/2020 patient was brought back to the emergency room department via EMS because he was found to be hypoxic at [address] was treated with Decadron 6 mg for 3 days and discharged 9/15/20 to home apartment with home health.  Prior to discharge on 9/15/2020 home oxygen evaluation was performed prior to discharge on 9/15/2020. Monica Amin was requiring 6 L of oxygen with activity however did not require oxygen at rest.     On arrival to [facility] patient states he is having increasing shortness of breath.  However denies chest pain, nausea, vomiting, diarrhea, fatigue, fever, chills.        Past Medical History: Vitamin D deficiency, obstructive sleep apnea uses CPAP at night, nonalcoholic steatohepatitis, morbid obesity, major depression, hypogonadism, essential hypertension, hyperlipidemia, history of osteomyelitis, history of alcohol abuse, heart failure with preserved ejection fraction, GERD, type 2 diabetes, chronic gout, CAD, BPH, atrial fibrillation, rheumatoid arthritis  Family History: Mother-heart disease with history of MI at 48years of age, .  Paternal aunt -lung cancer  Social History: History of alcohol abuse, lifetime non-smoker, denies substance abuse, , resides at [place] [address]  Unable to obtain secondary to patient intubated and sedated    Scheduled Meds:   magnesium replacement protocol   Other RX Placeholder    phosphorus replacement protocol   Other RX Placeholder    calcium replacement protocol   Other RX Placeholder    potassium replacement protocol   Other RX Placeholder    dexamethasone  4 mg Intravenous Daily    multivitamin+  30 mL Oral Daily    insulin lispro  0-18 Units Subcutaneous Q4H    insulin glargine  100 Units Subcutaneous BID    polyethylene glycol  17 g Oral Every Other Day    allopurinol  200 mg Oral Daily    [Held by provider] amLODIPine  5 mg Oral Daily    ARIPiprazole  15 mg Oral Daily    aspirin  81 mg Oral Daily    atorvastatin  40 mg Oral Nightly    busPIRone  10 mg Oral BID    citalopram  30 mg Oral Daily    Vitamin D  2,000 Units Oral Daily    dilTIAZem  60 mg Oral BID    folic acid  1 mg Oral Daily    furosemide  40 mg Oral Daily    gabapentin  800 mg Oral BID    [Held by provider] hydrALAZINE  50 mg Oral BID    enoxaparin  1 mg/kg Subcutaneous Q12H    [Held by provider] tamsulosin  0.4 mg Oral Nightly    sodium chloride flush  10 mL Intravenous 2 times per day    chlorhexidine  15 mL Mouth/Throat BID    famotidine (PEPCID) injection  20 mg Intravenous BID    glycopyrrolate-formoterol  2 puff Inhalation BID    senna  1 tablet Oral BID     Continuous Infusions:   norepinephrine Stopped (09/25/20 0002)    sodium chloride 100 mL/hr at 09/29/20 0110    propofol 25 mcg/kg/min (09/29/20 0317)    dextrose         PHYSICAL EXAMINATION:  T: 98.7.  P: 47. RR:  24. B/P:  141/80. FiO2:  30. O2 Sat:  94.  I/O:  946/750  Body mass index is 45.09 kg/m². GCS:   11  Mode: Spont PS: 10 PEEP: 6 FiO2: 30%   General: Acutely and chronically ill male that is critically ill on mechanical ventilation and sedated  HEENT:  normocephalic and atraumatic. No scleral icterus. PERR  Neck: supple. No Thyromegaly. Lungs: Diminished breath sounds bilaterally, clear to auscultation. No retractions  Cardiac: Bradycardic rate with 2 out of 6 murmur, regular rhythm. No JVD. Abdomen: soft. Nontender. Extremities:  No clubbing, cyanosis, or edema x 4. Vasculature: capillary refill < 3 seconds. Palpable dorsalis pedis pulses. Skin:  warm and dry. Psych: Unable to assess secondary to patient being sedated and on mechanical ventilation   Lymph:  No supraclavicular adenopathy. Neurologic:  No focal deficit. No seizures. Data: (All radiographs, tracings, PFTs, and imaging are personally viewed and interpreted unless otherwise noted).     Sodium 144, potassium 3.7, chloride 104, CO2 31, BUN 22, creatinine 0.9, lactic acid 1.2, calcium 8.9   WBC 9.8,  hemoglobin 11.4, hematocrit 36.9, platelet count 790   Albumin 3.0, alk phos 63, ALT 54, AST 32, bilirubin 0.4, total protein 5.9   Pro calcitonin 0.17    Telemetry shows sinus bradycardia    Chest x-ray 9/27/2020 reports: Slightly improving bilateral pneumonia   Chest x-ray 9/24/20 reports worsening bilateral pneumonia    Chest x-ray 9/23/20 demonstrated bilateral pulmonary opacifications worse than on previous study on September 10, 2020.  Possible worsening inflammatory process, possible COVID-19 infection, borderline cardiomegaly opacities    Echocardiogram 9/24/2020 reports: Ejection fraction is visually estimated at 60%. Overall left ventricular function is normal. The aortic valve leaflets were not well visualized. Aortic valve appears tricuspid. Aortic valve leaflets are somewhat thickened. Aortic valve leaflets are Mildly calcified. The maximum aortic valve gradient is 30 mmHg, the mean gradient is 15 mmHg, and the peak velocity is 275 cm/s. There is mild-to-moderate aortic stenosis with valve area of 1.5 sq cm.     Meets Continued ICU Level Care Criteria:    [x] Yes   [] No - Transfer Planned to listed location:  [] HOSPITALIST CONTACTED-      Case and plan discussed with Dr. Az Quintanilla. Electronically signed by Galdino Landaverde DO  CRITICAL CARE SPECIALIST  Patient seen by me. Case discussed with resident physician. As patient became more awake with weaning trial, he became tachypneic with a respiratory rate 27-30. Sedation re-implemented, and patient placed on pressure control ventilation. Repeat chest x-ray in the morning. Italicized font represents changes to the note made by me. CC time 35 minutes. Time was discontiguous. Time does not include resident physician assessment. Time does include my direct assessment of the patient and coordination of care. Electronically signed by Marco Quintanilla MD.

## 2020-09-29 NOTE — PLAN OF CARE
Problem: MECHANICAL VENTILATION  Goal: Normal spontaneous ventilation  9/29/2020 0541 by Adam Parada RCP  Outcome: Ongoing  Note: Placed on SPONT mode at 0535 PS 10 PEEP 6 and 30% tolerating well at this time. Will continue to monitor.

## 2020-09-29 NOTE — PLAN OF CARE
Problem: Falls - Risk of:  Goal: Will remain free from falls  Description: Will remain free from falls  9/28/2020 2203 by Jeffery Waller RN  Outcome: Ongoing  Note: Pt remains free from falls this far in shift. Bed in lowest position with bed alarm on. Pt sedated on vent. Problem: Falls - Risk of:  Goal: Absence of physical injury  9/28/2020 2203 by Jeffery Waller RN  Outcome: Ongoing  Note: No evidence of physical injury this far in shift. Problem: Skin Integrity:  Goal: Will show no infection signs and symptoms  Description: Will show no infection signs and symptoms  9/28/2020 2203 by Jeffery Waller RN  Outcome: Ongoing  Note: Pt on antibiotics for known infection. Problem: Skin Integrity:  Goal: Absence of new skin breakdown  Description: Absence of new skin breakdown  9/28/2020 2203 by Jeffery Waller RN  Outcome: Ongoing  Note: No evidence of new skin breakdown this far in shift. Pt turned and repositioned q 2 hr and prn. Bilateral arms and heels elevated on pillows. Problem: Discharge Planning:  Goal: Discharged to appropriate level of care  Description: Discharged to appropriate level of care  9/28/2020 2203 by Jeffery Waller RN  Outcome: Ongoing  Note: Pt continues to require ICU care. Case management following. Problem: Urinary Retention:  Goal: Urinary elimination within specified parameters  Description: Urinary elimination within specified parameters  9/28/2020 2203 by Jeffery Waller RN  Outcome: Ongoing  Note: Pt has mason catheter in place. Urine output > 30ml/hr. Problem: Nutrition  Goal: Optimal nutrition therapy  9/28/2020 2203 by Jeffery Waller RN  Outcome: Ongoing  Note: Pt tolerating continuous tube feed. Pt unable to participate in care planning and goal setting due to being sedated on vent. Pt family actively participates in care planning and goal setting.

## 2020-09-30 ENCOUNTER — APPOINTMENT (OUTPATIENT)
Dept: GENERAL RADIOLOGY | Age: 50
DRG: 870 | End: 2020-09-30
Payer: MEDICARE

## 2020-09-30 LAB
ALBUMIN SERPL-MCNC: 3.1 G/DL (ref 3.5–5.1)
ALP BLD-CCNC: 62 U/L (ref 38–126)
ALT SERPL-CCNC: 68 U/L (ref 11–66)
ANION GAP SERPL CALCULATED.3IONS-SCNC: 10 MEQ/L (ref 8–16)
AST SERPL-CCNC: 35 U/L (ref 5–40)
BASOPHILIA: ABNORMAL
BASOPHILS # BLD: 0.4 %
BASOPHILS ABSOLUTE: 0 THOU/MM3 (ref 0–0.1)
BILIRUB SERPL-MCNC: 0.4 MG/DL (ref 0.3–1.2)
BUN BLDV-MCNC: 24 MG/DL (ref 7–22)
CALCIUM SERPL-MCNC: 8.8 MG/DL (ref 8.5–10.5)
CHLORIDE BLD-SCNC: 103 MEQ/L (ref 98–111)
CO2: 30 MEQ/L (ref 23–33)
CREAT SERPL-MCNC: 0.8 MG/DL (ref 0.4–1.2)
EOSINOPHIL # BLD: 2.2 %
EOSINOPHILS ABSOLUTE: 0.2 THOU/MM3 (ref 0–0.4)
ERYTHROCYTE [DISTWIDTH] IN BLOOD BY AUTOMATED COUNT: 17 % (ref 11.5–14.5)
ERYTHROCYTE [DISTWIDTH] IN BLOOD BY AUTOMATED COUNT: 56.7 FL (ref 35–45)
GFR SERPL CREATININE-BSD FRML MDRD: > 90 ML/MIN/1.73M2
GLUCOSE BLD-MCNC: 103 MG/DL (ref 70–108)
GLUCOSE BLD-MCNC: 106 MG/DL (ref 70–108)
GLUCOSE BLD-MCNC: 120 MG/DL (ref 70–108)
GLUCOSE BLD-MCNC: 128 MG/DL (ref 70–108)
GLUCOSE BLD-MCNC: 148 MG/DL (ref 70–108)
GLUCOSE BLD-MCNC: 162 MG/DL (ref 70–108)
GLUCOSE BLD-MCNC: 169 MG/DL (ref 70–108)
GLUCOSE BLD-MCNC: 179 MG/DL (ref 70–108)
HCT VFR BLD CALC: 36.6 % (ref 42–52)
HEMOGLOBIN: 11.6 GM/DL (ref 14–18)
IMMATURE GRANS (ABS): 0.65 THOU/MM3 (ref 0–0.07)
IMMATURE GRANULOCYTES: 6.7 %
LACTIC ACID: 1.4 MMOL/L (ref 0.5–2.2)
LYMPHOCYTES # BLD: 17.6 %
LYMPHOCYTES ABSOLUTE: 1.7 THOU/MM3 (ref 1–4.8)
MCH RBC QN AUTO: 28.9 PG (ref 26–33)
MCHC RBC AUTO-ENTMCNC: 31.7 GM/DL (ref 32.2–35.5)
MCV RBC AUTO: 91 FL (ref 80–94)
MONOCYTES # BLD: 7 %
MONOCYTES ABSOLUTE: 0.7 THOU/MM3 (ref 0.4–1.3)
NUCLEATED RED BLOOD CELLS: 1 /100 WBC
PLATELET # BLD: 301 THOU/MM3 (ref 130–400)
PMV BLD AUTO: 11.8 FL (ref 9.4–12.4)
POTASSIUM SERPL-SCNC: 3.4 MEQ/L (ref 3.5–5.2)
POTASSIUM SERPL-SCNC: 3.5 MEQ/L (ref 3.5–5.2)
POTASSIUM SERPL-SCNC: 4.6 MEQ/L (ref 3.5–5.2)
PROCALCITONIN: 0.24 NG/ML (ref 0.01–0.09)
RBC # BLD: 4.02 MILL/MM3 (ref 4.7–6.1)
SCAN OF BLOOD SMEAR: NORMAL
SEG NEUTROPHILS: 66.1 %
SEGMENTED NEUTROPHILS ABSOLUTE COUNT: 6.4 THOU/MM3 (ref 1.8–7.7)
SODIUM BLD-SCNC: 143 MEQ/L (ref 135–145)
TOTAL PROTEIN: 6 G/DL (ref 6.1–8)
WBC # BLD: 9.7 THOU/MM3 (ref 4.8–10.8)

## 2020-09-30 PROCEDURE — 2580000003 HC RX 258: Performed by: HOSPITALIST

## 2020-09-30 PROCEDURE — 84132 ASSAY OF SERUM POTASSIUM: CPT

## 2020-09-30 PROCEDURE — C1751 CATH, INF, PER/CENT/MIDLINE: HCPCS

## 2020-09-30 PROCEDURE — 71045 X-RAY EXAM CHEST 1 VIEW: CPT

## 2020-09-30 PROCEDURE — 2580000003 HC RX 258: Performed by: INTERNAL MEDICINE

## 2020-09-30 PROCEDURE — 99291 CRITICAL CARE FIRST HOUR: CPT | Performed by: INTERNAL MEDICINE

## 2020-09-30 PROCEDURE — 6360000002 HC RX W HCPCS: Performed by: NURSE PRACTITIONER

## 2020-09-30 PROCEDURE — 85025 COMPLETE CBC W/AUTO DIFF WBC: CPT

## 2020-09-30 PROCEDURE — 83605 ASSAY OF LACTIC ACID: CPT

## 2020-09-30 PROCEDURE — 6360000002 HC RX W HCPCS: Performed by: STUDENT IN AN ORGANIZED HEALTH CARE EDUCATION/TRAINING PROGRAM

## 2020-09-30 PROCEDURE — 2500000003 HC RX 250 WO HCPCS: Performed by: NURSE PRACTITIONER

## 2020-09-30 PROCEDURE — 94003 VENT MGMT INPAT SUBQ DAY: CPT

## 2020-09-30 PROCEDURE — 94761 N-INVAS EAR/PLS OXIMETRY MLT: CPT

## 2020-09-30 PROCEDURE — 6370000000 HC RX 637 (ALT 250 FOR IP): Performed by: HOSPITALIST

## 2020-09-30 PROCEDURE — 76937 US GUIDE VASCULAR ACCESS: CPT

## 2020-09-30 PROCEDURE — 2100000000 HC CCU R&B

## 2020-09-30 PROCEDURE — 84145 PROCALCITONIN (PCT): CPT

## 2020-09-30 PROCEDURE — 6360000002 HC RX W HCPCS: Performed by: INTERNAL MEDICINE

## 2020-09-30 PROCEDURE — 94770 HC ETCO2 MONITOR DAILY: CPT

## 2020-09-30 PROCEDURE — 6360000002 HC RX W HCPCS: Performed by: HOSPITALIST

## 2020-09-30 PROCEDURE — 80202 ASSAY OF VANCOMYCIN: CPT

## 2020-09-30 PROCEDURE — 82948 REAGENT STRIP/BLOOD GLUCOSE: CPT

## 2020-09-30 PROCEDURE — 6370000000 HC RX 637 (ALT 250 FOR IP): Performed by: STUDENT IN AN ORGANIZED HEALTH CARE EDUCATION/TRAINING PROGRAM

## 2020-09-30 PROCEDURE — 02HV33Z INSERTION OF INFUSION DEVICE INTO SUPERIOR VENA CAVA, PERCUTANEOUS APPROACH: ICD-10-PCS | Performed by: INTERNAL MEDICINE

## 2020-09-30 PROCEDURE — 80053 COMPREHEN METABOLIC PANEL: CPT

## 2020-09-30 PROCEDURE — 6370000000 HC RX 637 (ALT 250 FOR IP): Performed by: NURSE PRACTITIONER

## 2020-09-30 PROCEDURE — 36415 COLL VENOUS BLD VENIPUNCTURE: CPT

## 2020-09-30 PROCEDURE — 36569 INSJ PICC 5 YR+ W/O IMAGING: CPT

## 2020-09-30 PROCEDURE — 94640 AIRWAY INHALATION TREATMENT: CPT

## 2020-09-30 PROCEDURE — 2709999900 HC NON-CHARGEABLE SUPPLY

## 2020-09-30 PROCEDURE — 2700000000 HC OXYGEN THERAPY PER DAY

## 2020-09-30 PROCEDURE — 6370000000 HC RX 637 (ALT 250 FOR IP): Performed by: INTERNAL MEDICINE

## 2020-09-30 RX ORDER — POTASSIUM CHLORIDE 29.8 MG/ML
20 INJECTION INTRAVENOUS PRN
Status: DISCONTINUED | OUTPATIENT
Start: 2020-09-30 | End: 2020-11-06 | Stop reason: HOSPADM

## 2020-09-30 RX ORDER — SODIUM CHLORIDE 0.9 % (FLUSH) 0.9 %
10 SYRINGE (ML) INJECTION PRN
Status: DISCONTINUED | OUTPATIENT
Start: 2020-09-30 | End: 2020-09-30 | Stop reason: SDUPTHER

## 2020-09-30 RX ORDER — SODIUM CHLORIDE 0.9 % (FLUSH) 0.9 %
10 SYRINGE (ML) INJECTION EVERY 12 HOURS SCHEDULED
Status: DISCONTINUED | OUTPATIENT
Start: 2020-09-30 | End: 2020-09-30 | Stop reason: SDUPTHER

## 2020-09-30 RX ORDER — LIDOCAINE HYDROCHLORIDE 10 MG/ML
5 INJECTION, SOLUTION EPIDURAL; INFILTRATION; INTRACAUDAL; PERINEURAL ONCE
Status: DISCONTINUED | OUTPATIENT
Start: 2020-09-30 | End: 2020-10-05

## 2020-09-30 RX ADMIN — FAMOTIDINE 20 MG: 10 INJECTION INTRAVENOUS at 08:08

## 2020-09-30 RX ADMIN — INSULIN LISPRO 3 UNITS: 100 INJECTION, SOLUTION INTRAVENOUS; SUBCUTANEOUS at 15:46

## 2020-09-30 RX ADMIN — SODIUM CHLORIDE, PRESERVATIVE FREE 10 ML: 5 INJECTION INTRAVENOUS at 08:08

## 2020-09-30 RX ADMIN — POTASSIUM CHLORIDE 20 MEQ: 400 INJECTION, SOLUTION INTRAVENOUS at 11:56

## 2020-09-30 RX ADMIN — Medication 30 ML: at 10:37

## 2020-09-30 RX ADMIN — POTASSIUM CHLORIDE 20 MEQ: 400 INJECTION, SOLUTION INTRAVENOUS at 06:55

## 2020-09-30 RX ADMIN — Medication 2000 UNITS: at 10:34

## 2020-09-30 RX ADMIN — INSULIN GLARGINE 100 UNITS: 100 INJECTION, SOLUTION SUBCUTANEOUS at 21:53

## 2020-09-30 RX ADMIN — GABAPENTIN 800 MG: 400 CAPSULE ORAL at 10:36

## 2020-09-30 RX ADMIN — SENNOSIDES 8.6 MG: 8.6 TABLET, FILM COATED ORAL at 10:47

## 2020-09-30 RX ADMIN — ARIPIPRAZOLE 15 MG: 15 TABLET ORAL at 08:23

## 2020-09-30 RX ADMIN — INSULIN GLARGINE 50 UNITS: 100 INJECTION, SOLUTION SUBCUTANEOUS at 11:49

## 2020-09-30 RX ADMIN — Medication 15 ML: at 08:07

## 2020-09-30 RX ADMIN — ENOXAPARIN SODIUM 130 MG: 150 INJECTION SUBCUTANEOUS at 10:38

## 2020-09-30 RX ADMIN — GLYCOPYRROLATE AND FORMOTEROL FUMARATE 2 PUFF: 9; 4.8 AEROSOL, METERED RESPIRATORY (INHALATION) at 08:15

## 2020-09-30 RX ADMIN — ATORVASTATIN CALCIUM 40 MG: 40 TABLET, FILM COATED ORAL at 21:53

## 2020-09-30 RX ADMIN — ALLOPURINOL 200 MG: 100 TABLET ORAL at 10:35

## 2020-09-30 RX ADMIN — FAMOTIDINE 20 MG: 10 INJECTION INTRAVENOUS at 21:53

## 2020-09-30 RX ADMIN — BUSPIRONE HYDROCHLORIDE 10 MG: 10 TABLET ORAL at 08:23

## 2020-09-30 RX ADMIN — DEXAMETHASONE SODIUM PHOSPHATE 4 MG: 4 INJECTION, SOLUTION INTRA-ARTICULAR; INTRALESIONAL; INTRAMUSCULAR; INTRAVENOUS; SOFT TISSUE at 10:45

## 2020-09-30 RX ADMIN — GLYCOPYRROLATE AND FORMOTEROL FUMARATE 2 PUFF: 9; 4.8 AEROSOL, METERED RESPIRATORY (INHALATION) at 21:05

## 2020-09-30 RX ADMIN — PROPOFOL 20 MCG/KG/MIN: 10 INJECTION, EMULSION INTRAVENOUS at 14:22

## 2020-09-30 RX ADMIN — PROPOFOL 20 MCG/KG/MIN: 10 INJECTION, EMULSION INTRAVENOUS at 11:30

## 2020-09-30 RX ADMIN — INSULIN LISPRO 3 UNITS: 100 INJECTION, SOLUTION INTRAVENOUS; SUBCUTANEOUS at 19:10

## 2020-09-30 RX ADMIN — FOLIC ACID 1 MG: 1 TABLET ORAL at 10:36

## 2020-09-30 RX ADMIN — PROPOFOL 20 MCG/KG/MIN: 10 INJECTION, EMULSION INTRAVENOUS at 18:52

## 2020-09-30 RX ADMIN — SENNOSIDES 8.6 MG: 8.6 TABLET, FILM COATED ORAL at 21:53

## 2020-09-30 RX ADMIN — VANCOMYCIN HYDROCHLORIDE 1500 MG: 5 INJECTION, POWDER, LYOPHILIZED, FOR SOLUTION INTRAVENOUS at 11:51

## 2020-09-30 RX ADMIN — POLYETHYLENE GLYCOL 3350 17 G: 17 POWDER, FOR SOLUTION ORAL at 10:34

## 2020-09-30 RX ADMIN — ASPIRIN 81 MG: 81 TABLET, CHEWABLE ORAL at 08:23

## 2020-09-30 RX ADMIN — POTASSIUM CHLORIDE 20 MEQ: 400 INJECTION, SOLUTION INTRAVENOUS at 06:05

## 2020-09-30 RX ADMIN — FUROSEMIDE 40 MG: 40 TABLET ORAL at 08:23

## 2020-09-30 RX ADMIN — PROPOFOL 20 MCG/KG/MIN: 10 INJECTION, EMULSION INTRAVENOUS at 06:05

## 2020-09-30 RX ADMIN — CITALOPRAM 30 MG: 20 TABLET, FILM COATED ORAL at 10:35

## 2020-09-30 RX ADMIN — POTASSIUM CHLORIDE 20 MEQ: 400 INJECTION, SOLUTION INTRAVENOUS at 11:55

## 2020-09-30 RX ADMIN — Medication 15 ML: at 21:53

## 2020-09-30 RX ADMIN — POTASSIUM CHLORIDE 20 MEQ: 400 INJECTION, SOLUTION INTRAVENOUS at 12:50

## 2020-09-30 RX ADMIN — SODIUM CHLORIDE, PRESERVATIVE FREE 10 ML: 5 INJECTION INTRAVENOUS at 21:53

## 2020-09-30 RX ADMIN — BUSPIRONE HYDROCHLORIDE 10 MG: 10 TABLET ORAL at 21:53

## 2020-09-30 RX ADMIN — INSULIN LISPRO 3 UNITS: 100 INJECTION, SOLUTION INTRAVENOUS; SUBCUTANEOUS at 03:57

## 2020-09-30 ASSESSMENT — PULMONARY FUNCTION TESTS
PIF_VALUE: 22
PIF_VALUE: 25
PIF_VALUE: 20
PIF_VALUE: 25
PIF_VALUE: 24
PIF_VALUE: 25

## 2020-09-30 ASSESSMENT — PAIN SCALES - WONG BAKER
WONGBAKER_NUMERICALRESPONSE: 0

## 2020-09-30 ASSESSMENT — PAIN SCALES - GENERAL: PAINLEVEL_OUTOF10: 0

## 2020-09-30 NOTE — PLAN OF CARE
Patient continues on ventilator; tolerating well  will continue weaning ventilator as patient tolerates.

## 2020-09-30 NOTE — PLAN OF CARE
Problem: Falls - Risk of:  Goal: Will remain free from falls  Description: Will remain free from falls  9/30/2020 1830 by Dilcia Fajardo RN  Outcome: Met This Shift  Note: Bed exit alarm remains on . Unable to use call light   9/30/2020 0439 by Dorothea Garcia RN  Note: Patient has remained free from falls throughout this shift. Patient's bed is in lowest position with bed alarm on. Patient is being rounded on every hour or as needed. Will continue to monitor. Problem: Falls - Risk of:  Goal: Absence of physical injury  Outcome: Met This Shift  Note: None this shift      Problem: Skin Integrity:  Goal: Will show no infection signs and symptoms  Description: Will show no infection signs and symptoms  9/30/2020 1830 by Dilcia Fajardo RN  Outcome: Ongoing  Note: No red or open areas   9/30/2020 0439 by Dorothea Garcia RN  Note: Patient shows no new signs or symptoms of infection. Patient in droplet precautions for Covid 19. Will continue to monitor     Problem: Skin Integrity:  Goal: Absence of new skin breakdown  Description: Absence of new skin breakdown  Outcome: Met This Shift  Note: No new breakdown noted      Problem: Discharge Planning:  Goal: Discharged to appropriate level of care  Description: Discharged to appropriate level of care  9/30/2020 1830 by Dilcia Fajardo RN  Outcome: Ongoing  Note: Requires icu at present   9/30/2020 0439 by Dorothea Garcia RN  Note: Patient continues to need ICU care. Case management following patient     Problem: Urinary Retention:  Goal: Urinary elimination within specified parameters  Description: Urinary elimination within specified parameters  9/30/2020 1830 by Dilcia Fajardo RN  Outcome: Ongoing  Note: Dimas remains in place   9/30/2020 0439 by Dorothea Garcia RN  Note: Patient has adequate urinary output this shift. Patient needing urinary catheter. Will continue to monitor.       Problem: Urinary Retention:  Goal: Able to perform urinary catheter care  Description: Able to perform urinary catheter care  Outcome: Ongoing  Note: N/a at present -sedated on vent      Problem: Urinary Retention:  Goal: Ability to reestablish a normal urinary elimination pattern will improve - after catheter removal  Description: Ability to reestablish a normal urinary elimination pattern will improve - after catheter removal  Outcome: Ongoing  Note: N/a at present      Problem: Urinary Retention:  Goal: Ability to recognize the need to void and respond appropriately will improve  Description: Ability to recognize the need to void and respond appropriately will improve  Outcome: Ongoing  Note: N/a at present      Problem: Urinary Retention:  Goal: Absence of postvoid residual urine  Description: Absence of postvoid residual urine  Outcome: Ongoing  Note: N/a at present      Problem: Nutrition  Goal: Optimal nutrition therapy  9/30/2020 1830 by Mary Camacho RN  Outcome: Met This Shift  Note: Tube feed at goal   9/30/2020 1412 by William Pang RD, LD  Outcome: Ongoing  9/30/2020 0439 by Matty Brooks RN  Note: Patient on TF this shift. Will continue to monitor. Problem: MECHANICAL VENTILATION  Goal: Normal spontaneous ventilation  9/30/2020 1641 by Gretchen Durand RCP  Outcome: Ongoing   Care plan reviewed with patient and family . Patient  expresses understanding of the plan of care and contribute to goal setting.    Family expresses understanding

## 2020-09-30 NOTE — CARE COORDINATION
9/30/20, 2:41 PM EDT    DISCHARGE ON GOING [address] day: 7  Location: -04/004-A Reason for admit: Acute respiratory failure with hypoxemia ([facility] [J96.01]  Acute respiratory failure with hypoxia ([facility] [address] [J96.01]   Procedure:   9/23 CXR: Bilateral pulmonary opacification worse than on previous study dated 10 September 2020. This may represent worsening inflammatory process, possibly Covid 19 infection; borderline cardiomegaly  9/23 Intubated  9/24 CVC left subclavian - 9/30 removed  4/42 PICC right basilic  9/74 CXR: No significant change in coarse scattered bilateral infiltrates  Treatment Plan of Care: Plan for SBT trial again today. Remains on vent w/ETT on AC/PC, peep 6, FIO2 30%, sats 92%. Tmax 99.6. SB 50's. Follows commands. Respiratory culture +MRSA. Cardiac monitoring, I&O, daily weight, oral care, OG w/TF, mason care. Diprivan @ 20 mcg/kg/min, allopurinol, abilify, asa, lipitor, buspar, celexa, IV decadron 4 mg daily, cardizem, lovenox bid, pepcid, folic acid, po lasix daily, neurontin, inhaler, lantus, SSI Q4H, multivitamin, IV vancomycin, vit D, Electrolyte replacement protocols. Procal 0.24, alb 3.1, alt 68, hgb 11.6. Barriers to Discharge: on vent  PCP: Yoko Reddy MD  Readmission Risk Score: 45%  Patient Goals/Plan/Treatment Preferences: From home alone and current [facility] for RN/PT/OT. SW on case. Will need PT/OT when appropriate.

## 2020-09-30 NOTE — PROGRESS NOTES
CRITICAL CARE PROGRESS NOTE      Patient:  Theresa Cowan    Unit/Bed:4B-04/004-A  YOB: 1970  MRN: [id number]   PCP: Belen Ashton MD  Date of Admission: 9/23/2020  Chief Complaint:-COVID-19 infection    Assessment and Plan:    1. Acute on chronic hypoxic respiratory failure: Due to Padmaja Leong was recently hospitalized for COVID-19 infection.  It was noted per EHR patient was not wearing 6 L nasal cannula as directed.  Patient became hypoxic and respiratory distress 9/23/2020, resulting in intubation.  Bronchoscopy on 9/27/2020 demonstrated no mucous plugging. Patient failed spontaneous breathing trial yesterday, as sedation was lifted patient became more tachypneic with respiratory rate 28-30. Patient be re-sedated on propofol and placed back on pressure control ventilation. Chest x-ray 9/30/20 reports no significant change in coarse scattered bilateral infiltrates. We will continue with spontaneous breathing trial today. Mode: Spont PIP: 25 PEEP: 6 FiO2: 30%   2. Septic/hypovolemic shock: Due to severe pneumonia and volume depletion.  Patient no longer requiring Levophed to maintain MAP > 65.   We want to keep patient on the dry side so hyaluronic gel does not accumulate within alveoli.  In the setting of COVID-19 infection/ARDS fluid will have an increase tendency to accumulate and reside in alveoli, with release of hyaluronic acid a hyaluronic gel can form further worsening alveoli surface area.   3. Severe pneumonia: Due to COVID-19 infection with superinfection with MRSA pneumonia. Chest x-ray demonstrating worsening bilateral pneumonia. Procalcitonin trending downwards 0.24, procalcitonin increased from yesterday in which procalcitonin was 0.17.  Respiratory PCR shows no identified bacterial infections or superimposed infections.  However, sputum culture growing MRSA.   Current hospitalization patient testing negative for COVID-19.  We will continue to treat for COVID-19, patient was previously treated with danazol, however due to patient being intubated and sedated danazol cannot be given as for it cannot be crushed to go down OG tube.  Patient s/p 2 units of convalescent plasma therapy.  Continue Decadron  4 mg daily for total of 10 doses ( day 8/10).    Decadron is only therapy noted to decrease mortality risk and severe COVID infections.  Continue aspirin 81 mg daily for treatment and prevention microthrombosis which is noted to be common in COVID/ARDS conditions. Vancomycin was added to treatment regimen for MRSA growing in sputum, day 2 of 8  on vancomycin. Bronchial lavage sent for CMV. 4. ARDS:  Secondary to complications of [id number].  Progressive bilateral infiltrates.  Continue with mechanical ventilator support.  Continue with lung protection strategies and permissive hypercapnia.  Patient is too large for pronation therapy. 5. Uncontrolled type 2 diabetes: Noncompliant, last A1c 9.9%. beta hydroxybutyrate 22.95.  Patient's blood sugars seem to be significantly improving.   Patient on tube feeds will initiate 200 mL free water flushes 3 times daily. Lantus was decreased to 50 units BID as well as high-dose sliding scale four times a day.  Continue gabapentin 800 mg twice daily. 6. History of BPH with associated urinary retention: Patient had about 1 L of urine retention and Dimas catheter had been inserted.  Patient takes Flomax at home, unable to continue use of Flomax since it cannot be crushed.  We will continue to follow culture  7. Chronic diastolic heart failure: Echocardiogram on 10/10/2019 demonstrated EF 55 to 60%.  Patient previously reported increased lower extremity edema prior admission 1 week ago.  proBNP negative.  Patient weight increased 4 pounds since last admission.  Patient currently has bilateral lower extremity edema however intravascularly appears dry.  Strict I's and O's and daily weights.  Echo 9/24/20 was significant for mild to and discharged 9/15/20 to home apartment with home health.  Prior to discharge on 9/15/2020 home oxygen evaluation was performed prior to discharge on 9/15/2020. Monica Amin was requiring 6 L of oxygen with activity however did not require oxygen at rest.     On arrival to [facility] patient states he is having increasing shortness of breath.  However denies chest pain, nausea, vomiting, diarrhea, fatigue, fever, chills.        Past Medical History: Vitamin D deficiency, obstructive sleep apnea uses CPAP at night, nonalcoholic steatohepatitis, morbid obesity, major depression, hypogonadism, essential hypertension, hyperlipidemia, history of osteomyelitis, history of alcohol abuse, heart failure with preserved ejection fraction, GERD, type 2 diabetes, chronic gout, CAD, BPH, atrial fibrillation, rheumatoid arthritis  Family History: Mother-heart disease with history of MI at 48years of age, .  Paternal aunt -lung cancer  Social History: History of alcohol abuse, lifetime non-smoker, denies substance abuse, , resides at [place] [id number]  Unable to obtain secondary to patient intubated and sedated    Scheduled Meds:   vancomycin  1,500 mg Intravenous Q12H    vancomycin (VANCOCIN) intermittent dosing (placeholder)   Other RX Placeholder    insulin glargine  50 Units Subcutaneous QAM    insulin glargine  100 Units Subcutaneous Nightly    magnesium replacement protocol   Other RX Placeholder    phosphorus replacement protocol   Other RX Placeholder    calcium replacement protocol   Other RX Placeholder    dexamethasone  4 mg Intravenous Daily    multivitamin+  30 mL Oral Daily    insulin lispro  0-18 Units Subcutaneous Q4H    polyethylene glycol  17 g Oral Every Other Day    allopurinol  200 mg Oral Daily    [Held by provider] amLODIPine  5 mg Oral Daily    ARIPiprazole  15 mg Oral Daily    aspirin  81 mg Oral Daily    atorvastatin  40 mg Oral Nightly    busPIRone  10 mg Oral BID    citalopram  30 mg Oral Daily    Vitamin D  2,000 Units Oral Daily    dilTIAZem  60 mg Oral BID    folic acid  1 mg Oral Daily    furosemide  40 mg Oral Daily    gabapentin  800 mg Oral BID    [Held by provider] hydrALAZINE  50 mg Oral BID    enoxaparin  1 mg/kg Subcutaneous Q12H    [Held by provider] tamsulosin  0.4 mg Oral Nightly    sodium chloride flush  10 mL Intravenous 2 times per day    chlorhexidine  15 mL Mouth/Throat BID    famotidine (PEPCID) injection  20 mg Intravenous BID    glycopyrrolate-formoterol  2 puff Inhalation BID    senna  1 tablet Oral BID     Continuous Infusions:   norepinephrine Stopped (09/25/20 0002)    sodium chloride 100 mL/hr at 09/29/20 2359    propofol 20 mcg/kg/min (09/30/20 0010)    dextrose         PHYSICAL EXAMINATION:  T: 98.8.  P: 46. RR: 16. B/P: 124/79. FiO2: 30. O2 Sat: 96.  I/O:  1335. 2/355  Body mass index is 44.38 kg/m². GCS:   10  Mode: Spont PIP: 25 PEEP: 6 FiO2: 30%   General:   Acute on chronically ill male, critically ill on mechanical ventilation  HEENT:  normocephalic and atraumatic. No scleral icterus. PERR  Neck: supple. No Thyromegaly. Lungs: Diminished breath sounds bilaterally clear to auscultation. No retractions  Cardiac: Distant heart sounds, clear S1-S2 with grade 2 out of 6 murmur. Bradycardic rate. No JVD. Abdomen: soft. Nontender. Bowel sounds present  Extremities:  No clubbing or cyanosis. Bilateral trace edema  Vasculature: capillary refill < 3 seconds. Faint dorsalis pedis pulses. Skin:  warm and dry. Psych: Unable to assess secondary to patient being intubated and sedated on mechanical ventilation  Lymph:  No supraclavicular adenopathy. Neurologic:  No focal deficit. No seizures. Data: (All radiographs, tracings, PFTs, and imaging are personally viewed and interpreted unless otherwise noted).     Sodium 143, potassium 4.3, chloride 103, CO2 30, BUN 24, creatinine 0.8, calcium 8.8   Lactic acid 4.4, procalcitonin 0.24   Albumin 3.1, alk phos 62, ALT 60, AST 35, bilirubin 0.4, total protein 6.0   WBC 9.7, hemoglobin 11.6, hematocrit 36.6, platelet count 615   Telemetry shows sinus bradycardia    Chest x-ray 9/30/20 reports: No significant change in coarse scattered bilateral infiltrates.  Chest x-ray 9/27/2020 reports: Slightly improving bilateral pneumonia   Chest x-ray 9/24/20 reports worsening bilateral pneumonia    Chest x-ray 9/23/20 demonstrated bilateral pulmonary opacifications worse than on previous study on September 10, 2020.  Possible worsening inflammatory process, possible COVID-19 infection, borderline cardiomegaly opacities    Echocardiogram 9/24/2020 reports: Ejection fraction is visually estimated at 60%. Overall left ventricular function is normal. The aortic valve leaflets were not well visualized. Aortic valve appears tricuspid. Aortic valve leaflets are somewhat thickened. Aortic valve leaflets are Mildly calcified. The maximum aortic valve gradient is 30 mmHg, the mean gradient is 15 mmHg, and the peak velocity is 275 cm/s. There is mild-to-moderate aortic stenosis with valve area of 1.5 sq cm.       Meets Continued ICU Level Care Criteria:    [x] Yes   [] No - Transfer Planned to listed location:  [] HOSPITALIST CONTACTED-      Case and plan discussed with Dr. Chano Jurado. Electronically signed by Diego Hanley DO  CRITICAL CARE SPECIALIST  Patient seen by me. Case discussed with resident physician. Patient remains critically ill on mechanical ventilator. Patient continues to fail at pressure support 10. Marginal at pressure support 14. Chest x-ray shows significant improvement in bilateral infiltrates compared with admission. Patient status post convalescent plasma. Morbid obesity remains a significant risk factor for failing weaning. CC time 35 minutes. Time was discontiguous. Time does not include resident physician assessment.   Time does include my direct assessment of the patient and coordination of care. Electronically signed by Rena Silva MD.

## 2020-09-30 NOTE — PLAN OF CARE
Problem: Falls - Risk of:  Goal: Will remain free from falls  Description: Will remain free from falls  Note: Patient has remained free from falls throughout this shift. Patient's bed is in lowest position with bed alarm on. Patient is being rounded on every hour or as needed. Will continue to monitor. Problem: Skin Integrity:  Goal: Will show no infection signs and symptoms  Description: Will show no infection signs and symptoms  Note: Patient shows no new signs or symptoms of infection. Patient in droplet precautions for Covid 19. Will continue to monitor     Problem: Discharge Planning:  Goal: Discharged to appropriate level of care  Description: Discharged to appropriate level of care  Note: Patient continues to need ICU care. Case management following patient     Problem: Urinary Retention:  Goal: Urinary elimination within specified parameters  Description: Urinary elimination within specified parameters  Note: Patient has adequate urinary output this shift. Patient needing urinary catheter. Will continue to monitor. Problem: Nutrition  Goal: Optimal nutrition therapy  Note: Patient on TF this shift. Will continue to monitor. Care plan reviewed with patient and family. Patient and family verbalize understanding of the plan of care and contribute to goal setting.

## 2020-09-30 NOTE — PROGRESS NOTES
PICC Procedure Note    Kenneth Bloom   Admitted- 9/23/2020  9:02 AM  Ad[facility]- [facility] [J96.01]  Acute respiratory failure with hypoxia [facility]) [J96.01]      Attending Physician- Denise Byrne MD  Ordering Physician-RICARDO Real CNP  Indication for Insertion: Poor Vascular Access    Catheter Insertion Date- 9/30/2020   Lot Number- [id number]  Gauge-6  Lumen-triple    Insertion Site- RICARDO Basilic  Vein Diameter- 3 mm  Catheter Length- 55 cm  Internal Length- 53 cm  Exposed Catheter Length- 2cm   Upper Arm Circumference- 34cm  Tip Confirmation System Bundle met- Yes  If X-ray required, Tip Location- n/a  Radiologist- n/a    PICC insertion successful- Yes  Ultrasound- yes    Okay To Use PICC- Yes    Electronically signed by Teresa Perkins RN on 9/30/2020 at 1:59 PM

## 2020-09-30 NOTE — PROGRESS NOTES
Comprehensive Nutrition Assessment    Type and Reason for Visit:  Reassess(TF management)    Nutrition Recommendations/Plan:   Continue EN of Vital 1.2 at 20 ml/hr  Increase proteinex 2GO (2.5oz liquid protein) bottle to 4x daily  Free water per MD  Consider probiotics  Monitoring diprivan, labs, tolerance to EN for adjustments as needed    Nutrition Assessment:    Pt improving from a nutritional standpoint AEB patient is tolerating EN but has increased protein needs   Remains at risk for further nutritional compromise r/t admitted with acute respiratory failure with hypoxemia, obesity, elevated Hgb A1C of 9.9,  and underlying medical condition (hx: CAD, DM, GERD, Alcohol abuse, HLD, HTN, Vitamin D deficiency). Nutrition recommendations/interventions as per above. Malnutrition Assessment:  Malnutrition Status:  Insufficient data(+covid)    Context:  Acute Illness     Findings of the 6 clinical characteristics of malnutrition:  Energy Intake:  No significant decrease in energy intake(great appetite noted 9/14/20. NPO x 1 day)  Weight Loss:  (-14# in 7 weeks, note current weight is stated so not reliable)     Body Fat Loss:  Unable to assess(+covid patient)     Muscle Mass Loss:  Unable to assess(+covid)    Fluid Accumulation:  1 - Mild Extremities   Strength:  Not Performed    Estimated Daily Nutrient Needs:  Energy (kcal):  7927-7088 (8-11/kgm during acute phase);  Weight Used for Energy Requirements:  Current(132kgm 9/30 bedscale)     Protein (g):  ~175 (2.5 grams/kg ideal weight); Weight Used for Protein Requirements:  Ideal(70 kg)        Fluid (ml/day):  per MD;     Nutrition Related Findings:  covid; intubated 9/23; tolerating EN at current goal; received 97% Rx TF volume past 24h; meds include decadron, folic acid, lasix, lantus, humalog, MVI, ATB, vitamin D, diprivan; BM x3 past 24h; abd soft; glucose 106  BUN 24  creatinine 0.8  MAP 88      Wounds:  Stage III, Pressure Injury(hip - per wound care RN healing as of 9/30)       Current Nutrition Therapies:    Current Tube Feeding (TF) Orders:  · Feeding Route: Orogastric(OGT placed 9/23/20)  · Formula: Semi-Elemental(Vital 1.2 started 9/24)  · Schedule: Continuous(20ml/hour)  · Additives/Modulars: Protein(1 2.5ounce liquid protein bottle increased to 4x daily 9/30)  · Water Flushes: per MD  · Goal TF & Flush Orders Provides: 992 kcals ( 576 TF, 416 protein modular)(1401 with diprivan lipids), 140 gms protein (36 TF, 104 protein modular), 53 gms cho, 2 gms fiber, 389ml H20 via TF, 810mg K+, 405mg phosphorus/24h    Additional Calorie Sources:   diprivan at 15.5ml/hour provides 409 lipid kcals    Anthropometric Measures:  · Height: 5' 8\" (172.7 cm)  · Current Body Weight: 291 lb (132 kg)(9/30 with +1 edema)   · Admission Body Weight: 285 lb (129.3 kg)(9/23/20, stated, +1 BLE and BUE edema)    · Usual Body Weight: 306 lb (138.8 kg)(EMR, 6/29/20, actual.  299# 8/15/20, bedscale.)     · Ideal Body Weight: 154 lbs;   · BMI: 44.3  · BMI Categories: Obese Class 3 (BMI 40.0 or greater)       Nutrition Diagnosis:   · Inadequate oral intake related to impaired respiratory function as evidenced by NPO or clear liquid status due to medical condition, intubation, nutrition support - enteral nutrition      Nutrition Interventions:   Food and/or Nutrient Delivery:  Continue NPO, Modify Tube Feeding, Vitamin Supplement  Nutrition Education/Counseling:  No recommendation at this time   Coordination of Nutrition Care:  Continued Inpatient Monitoring, Interdisciplinary Rounds    Goals:  Patient will tolerate EN at 10-20 ml/hr during acute phase       Nutrition Monitoring and Evaluation:   Behavioral-Environmental Outcomes:  (n/a)   Food/Nutrient Intake Outcomes:  Enteral Nutrition Intake/Tolerance  Physical Signs/Symptoms Outcomes:  Biochemical Data, GI Status, Fluid Status or Edema, Hemodynamic Status, Skin, Weight     Discharge Planning:     Too soon to determine Electronically signed by Milo Packer RD, LD on 9/30/20 at 2:13 PM EDT    Contact: [phone]

## 2020-09-30 NOTE — PLAN OF CARE
Problem: Nutrition  Goal: Optimal nutrition therapy  9/30/2020 1412 by Heide Souza RD, LD  Outcome: Ongoing   Nutrition Problem #1: Inadequate oral intake  Intervention: Food and/or Nutrient Delivery: Continue NPO, Modify Tube Feeding, Vitamin Supplement  Nutritional Goals: Patient will tolerate EN at 10-20 ml/hr during acute phase

## 2020-09-30 NOTE — PROGRESS NOTES
Continual oozing noted form former cvc site- pressure held x 20 minutes - continued to ooz- surgicell applied - oozing stopped . Pt resting quietly - no complaints- nods head yes and no to questions - follows all commands .

## 2020-10-01 LAB
ALBUMIN SERPL-MCNC: 3.2 G/DL (ref 3.5–5.1)
ALP BLD-CCNC: 64 U/L (ref 38–126)
ALT SERPL-CCNC: 61 U/L (ref 11–66)
ANION GAP SERPL CALCULATED.3IONS-SCNC: 11 MEQ/L (ref 8–16)
AST SERPL-CCNC: 24 U/L (ref 5–40)
BASOPHILS # BLD: 0.4 %
BASOPHILS ABSOLUTE: 0 THOU/MM3 (ref 0–0.1)
BILIRUB SERPL-MCNC: 0.3 MG/DL (ref 0.3–1.2)
BUN BLDV-MCNC: 20 MG/DL (ref 7–22)
CALCIUM SERPL-MCNC: 9.5 MG/DL (ref 8.5–10.5)
CHLORIDE BLD-SCNC: 101 MEQ/L (ref 98–111)
CO2: 30 MEQ/L (ref 23–33)
CREAT SERPL-MCNC: 0.7 MG/DL (ref 0.4–1.2)
EOSINOPHIL # BLD: 1.9 %
EOSINOPHILS ABSOLUTE: 0.2 THOU/MM3 (ref 0–0.4)
ERYTHROCYTE [DISTWIDTH] IN BLOOD BY AUTOMATED COUNT: 17.2 % (ref 11.5–14.5)
ERYTHROCYTE [DISTWIDTH] IN BLOOD BY AUTOMATED COUNT: 57.6 FL (ref 35–45)
GFR SERPL CREATININE-BSD FRML MDRD: > 90 ML/MIN/1.73M2
GLUCOSE BLD-MCNC: 117 MG/DL (ref 70–108)
GLUCOSE BLD-MCNC: 172 MG/DL (ref 70–108)
GLUCOSE BLD-MCNC: 207 MG/DL (ref 70–108)
GLUCOSE BLD-MCNC: 77 MG/DL (ref 70–108)
GLUCOSE BLD-MCNC: 78 MG/DL (ref 70–108)
HCT VFR BLD CALC: 37.8 % (ref 42–52)
HEMOGLOBIN: 11.8 GM/DL (ref 14–18)
IMMATURE GRANS (ABS): 0.63 THOU/MM3 (ref 0–0.07)
IMMATURE GRANULOCYTES: 6 %
LACTIC ACID: 1.2 MMOL/L (ref 0.5–2.2)
LYMPHOCYTES # BLD: 15 %
LYMPHOCYTES ABSOLUTE: 1.6 THOU/MM3 (ref 1–4.8)
MCH RBC QN AUTO: 28.6 PG (ref 26–33)
MCHC RBC AUTO-ENTMCNC: 31.2 GM/DL (ref 32.2–35.5)
MCV RBC AUTO: 91.5 FL (ref 80–94)
MONOCYTES # BLD: 10.7 %
MONOCYTES ABSOLUTE: 1.1 THOU/MM3 (ref 0.4–1.3)
NUCLEATED RED BLOOD CELLS: 1 /100 WBC
PLATELET # BLD: 320 THOU/MM3 (ref 130–400)
PMV BLD AUTO: 11.7 FL (ref 9.4–12.4)
POTASSIUM SERPL-SCNC: 3.7 MEQ/L (ref 3.5–5.2)
PROCALCITONIN: 0.23 NG/ML (ref 0.01–0.09)
RBC # BLD: 4.13 MILL/MM3 (ref 4.7–6.1)
SEG NEUTROPHILS: 66 %
SEGMENTED NEUTROPHILS ABSOLUTE COUNT: 6.9 THOU/MM3 (ref 1.8–7.7)
SODIUM BLD-SCNC: 142 MEQ/L (ref 135–145)
TOTAL PROTEIN: 6.2 G/DL (ref 6.1–8)
VANCOMYCIN TROUGH: 12.6 UG/ML (ref 5–15)
WBC # BLD: 10.5 THOU/MM3 (ref 4.8–10.8)

## 2020-10-01 PROCEDURE — 94761 N-INVAS EAR/PLS OXIMETRY MLT: CPT

## 2020-10-01 PROCEDURE — 6370000000 HC RX 637 (ALT 250 FOR IP): Performed by: NURSE PRACTITIONER

## 2020-10-01 PROCEDURE — 80053 COMPREHEN METABOLIC PANEL: CPT

## 2020-10-01 PROCEDURE — 94003 VENT MGMT INPAT SUBQ DAY: CPT

## 2020-10-01 PROCEDURE — 6360000002 HC RX W HCPCS: Performed by: HOSPITALIST

## 2020-10-01 PROCEDURE — 84145 PROCALCITONIN (PCT): CPT

## 2020-10-01 PROCEDURE — 2700000000 HC OXYGEN THERAPY PER DAY

## 2020-10-01 PROCEDURE — 6370000000 HC RX 637 (ALT 250 FOR IP): Performed by: STUDENT IN AN ORGANIZED HEALTH CARE EDUCATION/TRAINING PROGRAM

## 2020-10-01 PROCEDURE — 83605 ASSAY OF LACTIC ACID: CPT

## 2020-10-01 PROCEDURE — 94770 HC ETCO2 MONITOR DAILY: CPT

## 2020-10-01 PROCEDURE — 94640 AIRWAY INHALATION TREATMENT: CPT

## 2020-10-01 PROCEDURE — 6370000000 HC RX 637 (ALT 250 FOR IP): Performed by: HOSPITALIST

## 2020-10-01 PROCEDURE — 6370000000 HC RX 637 (ALT 250 FOR IP): Performed by: INTERNAL MEDICINE

## 2020-10-01 PROCEDURE — 2500000003 HC RX 250 WO HCPCS: Performed by: NURSE PRACTITIONER

## 2020-10-01 PROCEDURE — 82948 REAGENT STRIP/BLOOD GLUCOSE: CPT

## 2020-10-01 PROCEDURE — 6360000002 HC RX W HCPCS: Performed by: INTERNAL MEDICINE

## 2020-10-01 PROCEDURE — 2100000000 HC CCU R&B

## 2020-10-01 PROCEDURE — 85025 COMPLETE CBC W/AUTO DIFF WBC: CPT

## 2020-10-01 PROCEDURE — 2580000003 HC RX 258: Performed by: INTERNAL MEDICINE

## 2020-10-01 PROCEDURE — 36415 COLL VENOUS BLD VENIPUNCTURE: CPT

## 2020-10-01 PROCEDURE — 99291 CRITICAL CARE FIRST HOUR: CPT | Performed by: INTERNAL MEDICINE

## 2020-10-01 PROCEDURE — 6360000002 HC RX W HCPCS: Performed by: NURSE PRACTITIONER

## 2020-10-01 PROCEDURE — 36592 COLLECT BLOOD FROM PICC: CPT

## 2020-10-01 PROCEDURE — 6360000002 HC RX W HCPCS: Performed by: STUDENT IN AN ORGANIZED HEALTH CARE EDUCATION/TRAINING PROGRAM

## 2020-10-01 PROCEDURE — 2580000003 HC RX 258: Performed by: HOSPITALIST

## 2020-10-01 RX ADMIN — PROPOFOL 40 MCG/KG/MIN: 10 INJECTION, EMULSION INTRAVENOUS at 08:18

## 2020-10-01 RX ADMIN — VANCOMYCIN HYDROCHLORIDE 1750 MG: 5 INJECTION, POWDER, LYOPHILIZED, FOR SOLUTION INTRAVENOUS at 14:25

## 2020-10-01 RX ADMIN — SENNOSIDES 8.6 MG: 8.6 TABLET, FILM COATED ORAL at 11:01

## 2020-10-01 RX ADMIN — Medication 2000 UNITS: at 11:00

## 2020-10-01 RX ADMIN — Medication 10 ML: at 11:08

## 2020-10-01 RX ADMIN — ARIPIPRAZOLE 15 MG: 15 TABLET ORAL at 11:02

## 2020-10-01 RX ADMIN — FOLIC ACID 1 MG: 1 TABLET ORAL at 11:04

## 2020-10-01 RX ADMIN — ENOXAPARIN SODIUM 130 MG: 150 INJECTION SUBCUTANEOUS at 20:56

## 2020-10-01 RX ADMIN — PROPOFOL 40 MCG/KG/MIN: 10 INJECTION, EMULSION INTRAVENOUS at 02:02

## 2020-10-01 RX ADMIN — GLYCOPYRROLATE AND FORMOTEROL FUMARATE 2 PUFF: 9; 4.8 AEROSOL, METERED RESPIRATORY (INHALATION) at 21:26

## 2020-10-01 RX ADMIN — DEXAMETHASONE SODIUM PHOSPHATE 4 MG: 4 INJECTION, SOLUTION INTRA-ARTICULAR; INTRALESIONAL; INTRAMUSCULAR; INTRAVENOUS; SOFT TISSUE at 11:00

## 2020-10-01 RX ADMIN — FAMOTIDINE 20 MG: 10 INJECTION INTRAVENOUS at 20:56

## 2020-10-01 RX ADMIN — ALLOPURINOL 200 MG: 100 TABLET ORAL at 11:02

## 2020-10-01 RX ADMIN — INSULIN LISPRO 2 UNITS: 100 INJECTION, SOLUTION INTRAVENOUS; SUBCUTANEOUS at 16:38

## 2020-10-01 RX ADMIN — ASPIRIN 81 MG: 81 TABLET, CHEWABLE ORAL at 11:04

## 2020-10-01 RX ADMIN — SODIUM CHLORIDE, PRESERVATIVE FREE 10 ML: 5 INJECTION INTRAVENOUS at 21:25

## 2020-10-01 RX ADMIN — PROPOFOL 25 MCG/KG/MIN: 10 INJECTION, EMULSION INTRAVENOUS at 14:11

## 2020-10-01 RX ADMIN — FAMOTIDINE 20 MG: 10 INJECTION INTRAVENOUS at 11:00

## 2020-10-01 RX ADMIN — Medication 10 ML: at 14:26

## 2020-10-01 RX ADMIN — ENOXAPARIN SODIUM 130 MG: 150 INJECTION SUBCUTANEOUS at 08:05

## 2020-10-01 RX ADMIN — Medication 30 ML: at 11:02

## 2020-10-01 RX ADMIN — SENNOSIDES 8.6 MG: 8.6 TABLET, FILM COATED ORAL at 20:56

## 2020-10-01 RX ADMIN — ALBUTEROL SULFATE 5 MG: 2.5 SOLUTION RESPIRATORY (INHALATION) at 01:14

## 2020-10-01 RX ADMIN — Medication 15 ML: at 21:23

## 2020-10-01 RX ADMIN — BUSPIRONE HYDROCHLORIDE 10 MG: 10 TABLET ORAL at 11:02

## 2020-10-01 RX ADMIN — FUROSEMIDE 40 MG: 40 TABLET ORAL at 11:00

## 2020-10-01 RX ADMIN — CITALOPRAM 30 MG: 20 TABLET, FILM COATED ORAL at 11:02

## 2020-10-01 RX ADMIN — GLYCOPYRROLATE AND FORMOTEROL FUMARATE 2 PUFF: 9; 4.8 AEROSOL, METERED RESPIRATORY (INHALATION) at 07:40

## 2020-10-01 RX ADMIN — SODIUM CHLORIDE, PRESERVATIVE FREE 10 ML: 5 INJECTION INTRAVENOUS at 11:07

## 2020-10-01 RX ADMIN — VANCOMYCIN HYDROCHLORIDE 1500 MG: 5 INJECTION, POWDER, LYOPHILIZED, FOR SOLUTION INTRAVENOUS at 01:03

## 2020-10-01 RX ADMIN — ATORVASTATIN CALCIUM 40 MG: 40 TABLET, FILM COATED ORAL at 20:56

## 2020-10-01 RX ADMIN — Medication 15 ML: at 09:00

## 2020-10-01 RX ADMIN — PROPOFOL 25 MCG/KG/MIN: 10 INJECTION, EMULSION INTRAVENOUS at 18:30

## 2020-10-01 RX ADMIN — GABAPENTIN 800 MG: 400 CAPSULE ORAL at 11:02

## 2020-10-01 RX ADMIN — INSULIN GLARGINE 100 UNITS: 100 INJECTION, SOLUTION SUBCUTANEOUS at 21:25

## 2020-10-01 RX ADMIN — PROPOFOL 40 MCG/KG/MIN: 10 INJECTION, EMULSION INTRAVENOUS at 05:32

## 2020-10-01 RX ADMIN — BUSPIRONE HYDROCHLORIDE 10 MG: 10 TABLET ORAL at 20:56

## 2020-10-01 ASSESSMENT — PAIN SCALES - WONG BAKER
WONGBAKER_NUMERICALRESPONSE: 0

## 2020-10-01 ASSESSMENT — PAIN SCALES - GENERAL
PAINLEVEL_OUTOF10: 0

## 2020-10-01 ASSESSMENT — PULMONARY FUNCTION TESTS
PIF_VALUE: 23
PIF_VALUE: 23
PIF_VALUE: 20
PIF_VALUE: 21

## 2020-10-01 NOTE — PROGRESS NOTES
CRITICAL CARE PROGRESS NOTE      Patient:  Jimbo Verdugo    Unit/Bed:4B-04/004-A  YOB: 1970  MRN: [id number]   PCP: Yanick Hernandez MD  Date of Admission: 9/23/2020  Chief Complaint:-COVID-19 infection    Assessment and Plan:    1. Acute on chronic hypoxic respiratory failure: Due to Beckie Flanagan was recently hospitalized for COVID-19 infection.  It was noted per EHR patient was not wearing 6 L nasal cannula as directed.  Patient became hypoxic and respiratory distress 9/23/2020, resulting in intubation.  Bronchoscopy on 9/27/2020 demonstrated no mucous plugging. Patient failed spontaneous breathing trial, as sedation was lifted patient became more tachypneic with respiratory rate 28-30. Patient re-sedated on propofol and placed back on pressure control ventilation. Chest x-ray 9/30/20 reports no significant change in coarse scattered bilateral infiltrates. We will continue with spontaneous breathing trial when appropriate. Mode: CMV PIP: 21 PEEP: 6 FiO2: 30%   2. Septic/hypovolemic shock: Due to severe pneumonia and volume depletion.  Patient no longer requiring Levophed to maintain MAP > 65.  We want to keep patient on the dry side so hyaluronic gel does not accumulate within alveoli.  In the setting of COVID-19 infection/ARDS fluid will have an increase tendency to accumulate and reside in alveoli, with release of hyaluronic acid a hyaluronic gel can form further worsening alveoli surface area.   3. Severe pneumonia: Due to COVID-19 infection with superinfection with MRSA pneumonia. Chest x-ray demonstrating no significant change in coarse scattered bilateral infiltrates. Procalcitonin trending downwards 0.23.  Respiratory PCR shows no identified bacterial infections or superimposed infections.  However, ET tube culture growing MRSA.   Current hospitalization patient testing negative for COVID-19.  We will continue to treat for COVID-19, patient was previously treated with Continue Lasix 40 mg daily   2. History of paroxysmal atrial fibrillation: Rate controlled - Cardizem 60mg BID.  Patient was previously on Eliquis however due to weight being greater than 120 kg. Lovenox 120 mg subcutaneous BID is indicated as treatment of choice for anticoagulation;    8. Morbid obesity: BMI 43.33.  Will educate patient on importance of weight loss when appropriate  9. Obstructive sleep apnea: We will encourage patient to wear home CPAP after extubated  10. Vitamin D deficiency: Continue vitamin D supplementation  11. History of hyperlipidemia: Continue Lipitor 40 mg nightly  12. History of gout: Continue allopurinol  13. History of essential hypertension:   Hold Norvasc and hydralazine as patient is currently intubated and requiring pressor therapy  14. History of anxiety/depression: Continue BuSpar, Celexa and Abilify     INITIAL H AND P AND ICU COURSE:  78-year-old male presented to the [facility] emergency department complaining of progressive worsening shortness of breath for the last 2 days.  Patient denies chest pain, fever, chills, diarrhea, urinary symptoms.  Patient admits to bilateral lower extremity committee swelling.  Patient admits that he is unsure if he has been taking his medication as directed from prior discharge.     Patient has a significant past medical history for obstructive sleep apnea which uses CPAP machine, obesity, anxiety, depression, hypertension, hyperlipidemia, rheumatoid arthritis, type 2 diabetes and atrial fibrillation.  Patient was diagnosed with COVID19  pneumonia and admitted to [facility] last month. Nelsy Rivera was treated at that time with Decadron, Remdesivir and azithromycin patient was discharged on 8/12/2020.  9/6/2020 patient was brought back to the emergency room department via EMS because he was found to be hypoxic at [address] was treated with Decadron 6 mg for 3 days and discharged 9/15/20 to home apartment with home health.  Prior to discharge on 9/15/2020 home oxygen evaluation was performed prior to discharge on 9/15/2020. Steffanie Homans was requiring 6 L of oxygen with activity however did not require oxygen at rest.     On arrival to [facility] patient states he is having increasing shortness of breath.  However denies chest pain, nausea, vomiting, diarrhea, fatigue, fever, chills.        Past Medical History: Vitamin D deficiency, obstructive sleep apnea uses CPAP at night, nonalcoholic steatohepatitis, morbid obesity, major depression, hypogonadism, essential hypertension, hyperlipidemia, history of osteomyelitis, history of alcohol abuse, heart failure with preserved ejection fraction, GERD, type 2 diabetes, chronic gout, CAD, BPH, atrial fibrillation, rheumatoid arthritis  Family History: Mother-heart disease with history of MI at 48years of age, .  Paternal aunt -lung cancer  Social History: History of alcohol abuse, lifetime non-smoker, denies substance abuse, , resides at [place] [address]  Unable to obtain secondary to patient intubated and sedated    Scheduled Meds:   vancomycin  1,750 mg Intravenous Q12H    lidocaine 1 % injection  5 mL Intradermal Once    vancomycin (VANCOCIN) intermittent dosing (placeholder)   Other RX Placeholder    insulin glargine  50 Units Subcutaneous QAM    insulin glargine  100 Units Subcutaneous Nightly    magnesium replacement protocol   Other RX Placeholder    phosphorus replacement protocol   Other RX Placeholder    calcium replacement protocol   Other RX Placeholder    multivitamin+  30 mL Oral Daily    insulin lispro  0-18 Units Subcutaneous Q4H    polyethylene glycol  17 g Oral Every Other Day    allopurinol  200 mg Oral Daily    [Held by provider] amLODIPine  5 mg Oral Daily    ARIPiprazole  15 mg Oral Daily    aspirin  81 mg Oral Daily    atorvastatin  40 mg Oral Nightly    busPIRone  10 mg Oral BID    citalopram  30 mg Oral Daily    Vitamin D  2,000 Units Oral Daily    dilTIAZem  60 mg Oral BID    folic acid  1 mg Oral Daily    furosemide  40 mg Oral Daily    gabapentin  800 mg Oral BID    [Held by provider] hydrALAZINE  50 mg Oral BID    enoxaparin  1 mg/kg Subcutaneous Q12H    [Held by provider] tamsulosin  0.4 mg Oral Nightly    sodium chloride flush  10 mL Intravenous 2 times per day    chlorhexidine  15 mL Mouth/Throat BID    famotidine (PEPCID) injection  20 mg Intravenous BID    glycopyrrolate-formoterol  2 puff Inhalation BID    senna  1 tablet Oral BID     Continuous Infusions:   norepinephrine Stopped (09/25/20 0002)    propofol 25 mcg/kg/min (10/01/20 1411)    dextrose         PHYSICAL EXAMINATION:  T: 98.8.  P: 46. RR: 19. B/P: 106/68. FiO2: 30. O2 Sat: 96.  I/O:  I/O last 3 completed shifts: In: 2762 [I.V.:1826; NG/GT:676; IV Piggyback:500]  Out: 1857 [Urine:4550]  I/O this shift: In: 529 [I.V.:219; NG/GT:310]  Out: 1100 [Urine:1100]        Body mass index is 44.38 kg/m². GCS:   9  Mode: CMV PIP: 21 PEEP: 6 FiO2: 30%   General:   Acute on chronically ill male, critically ill on mechanical ventilation  HEENT:  normocephalic and atraumatic. No scleral icterus. PERR  Neck: supple. No Thyromegaly. Lungs: Diminished breath sounds bilaterally clear to auscultation. No retractions  Cardiac: Distant heart sounds, clear S1-S2 with grade 2 out of 6 murmur. Bradycardic rate. No JVD. Abdomen: soft. Nontender. Bowel sounds present  Extremities:  No clubbing or cyanosis. Bilateral trace edema  Vasculature: capillary refill < 3 seconds. Faint dorsalis pedis pulses. Skin:  warm and dry. Psych: Unable to assess secondary to patient being intubated and sedated on mechanical ventilation  Lymph:  No supraclavicular adenopathy. Neurologic:  No focal deficit. No seizures. Data: (All radiographs, tracings, PFTs, and imaging are personally viewed and interpreted unless otherwise noted).     Sodium 142, potassium 3.7, chloride 101, CO2 30, BUN 20, creatinine 0.7, calcium 9.5   Lactic acid 1.2, procalcitonin 0.23   Albumin 3.2, alk phos 64, ALT 61, AST 24, bilirubin 0.3, total protein 6.2   WBC 10.5, hemoglobin 11.8, hematocrit 37.8, platelet count 689   Telemetry shows sinus bradycardia    Chest x-ray 9/30/20 reports: No significant change in coarse scattered bilateral infiltrates.  Chest x-ray 9/27/2020 reports: Slightly improving bilateral pneumonia   Chest x-ray 9/24/20 reports worsening bilateral pneumonia    Chest x-ray 9/23/20 demonstrated bilateral pulmonary opacifications worse than on previous study on September 10, 2020.  Possible worsening inflammatory process, possible COVID-19 infection, borderline cardiomegaly opacities    Echocardiogram 9/24/2020 reports: Ejection fraction is visually estimated at 60%. Overall left ventricular function is normal. The aortic valve leaflets were not well visualized. Aortic valve appears tricuspid. Aortic valve leaflets are somewhat thickened. Aortic valve leaflets are Mildly calcified. The maximum aortic valve gradient is 30 mmHg, the mean gradient is 15 mmHg, and the peak velocity is 275 cm/s. There is mild-to-moderate aortic stenosis with valve area of 1.5 sq cm.       Meets Continued ICU Level Care Criteria:    [x] Yes   [] No - Transfer Planned to listed location:  [] HOSPITALIST CONTACTED-      Case and plan discussed with Dr. Chuy Naidu. Electronically signed by Annamary Curling, MD  Patient seen by me. Case discussed with resident physician. Patient requiring mechanical ventilator support secondary to ongoing hypoxemia. PEEP requirements and FiO2 requirements are improving. Patient still failed spontaneous breathing trial less than pressure support 14. Patient on vancomycin for MRSA pneumonia on top of COVID. Cc time 35 minutes. Time does not include resident physician assessment.   Time does include my direct assessment and coordination of care.  Electronically signed by Sayda Yoder.  Stephanie Hodgson MD.

## 2020-10-01 NOTE — PLAN OF CARE
Problem: Falls - Risk of:  Goal: Will remain free from falls  Description: Will remain free from falls  9/30/2020 1830 by Mary Camacho RN  Outcome: Met This Shift  Note: Bed exit alarm remains on . Unable to use call light   9/30/2020 0439 by Matty Brooks RN  Note: Patient has remained free from falls throughout this shift. Patient's bed is in lowest position with bed alarm on. Patient is being rounded on every hour or as needed. Will continue to monitor. Problem: Falls - Risk of:  Goal: Absence of physical injury  Outcome: Met This Shift  Note: None this shift      Problem: Skin Integrity:  Goal: Will show no infection signs and symptoms  Description: Will show no infection signs and symptoms  9/30/2020 1830 by Mary Camacho RN  Outcome: Ongoing  Note: No red or open areas   9/30/2020 0439 by Matty Brooks RN  Note: Patient shows no new signs or symptoms of infection. Patient in droplet precautions for Covid 19. Will continue to monitor     Problem: Skin Integrity:  Goal: Absence of new skin breakdown  Description: Absence of new skin breakdown  Outcome: Met This Shift  Note: No new breakdown noted      Problem: Discharge Planning:  Goal: Discharged to appropriate level of care  Description: Discharged to appropriate level of care  9/30/2020 1830 by Mary Camacho RN  Outcome: Ongoing  Note: Requires icu at present   9/30/2020 0439 by Matty Brooks RN  Note: Patient continues to need ICU care. Case management following patient     Problem: Urinary Retention:  Goal: Urinary elimination within specified parameters  Description: Urinary elimination within specified parameters  9/30/2020 1830 by Mary Camacho RN  Outcome: Ongoing  Note: Dimas remains in place   9/30/2020 0439 by Matty Brooks RN  Note: Patient has adequate urinary output this shift. Patient needing urinary catheter. Will continue to monitor.       Problem: Urinary Retention:  Goal: Able to perform urinary

## 2020-10-01 NOTE — PLAN OF CARE
Problem: Nutrition  Goal: Optimal nutrition therapy  Outcome: Ongoing   Nutrition Problem #1: Inadequate oral intake  Intervention: Food and/or Nutrient Delivery: Continue NPO, Modify Tube Feeding, Vitamin Supplement  Nutritional Goals: Pt. will tolerate EN to meet nutrient needs during late phase of covid infection

## 2020-10-01 NOTE — PROGRESS NOTES
Pharmacy Vancomycin Consult     Vancomycin Day: 3  Current Dosin mg IV every 12 hours    Temp max:  100.5    Recent Labs     20  0427 10/01/20  0558   BUN 24* 20     Recent Labs     20  0427 10/01/20  0558   CREATININE 0.8 0.7     Recent Labs     20  0427 10/01/20  0558   WBC 9.7 10.5     Intake/Output Summary (Last 24 hours) at 10/1/2020 0953  Last data filed at 10/1/2020 0604  Gross per 24 hour   Intake 3002 ml   Output 4550 ml   Net -1548 ml     Culture Date      Source                       Results  2020          respiratory                 MRSA    Ht Readings from Last 1 Encounters:   20 5' 8\" (1.727 m)        Wt Readings from Last 1 Encounters:   20 291 lb 14.2 oz (132.4 kg)       Body mass index is 44.38 kg/m². Estimated Creatinine Clearance: 166 mL/min (based on SCr of 0.7 mg/dL). Trough: 12.6    Assessment/Plan:  Increase vancomycin to 1750 mg IV every 12 hours.     Noman RamirezD, BCPS, BCCCP  10/1/2020 9:56 AM

## 2020-10-01 NOTE — PROGRESS NOTES
Comprehensive Nutrition Assessment    Type and Reason for Visit:  Reassess(TF management)    Nutrition Recommendations/Plan:   Increase Vital 1.2 to 35ml/hour to meet increased late phase covid nutritional needs  Continue proteinex 2GO (2.5oz liquid protein) bottle 4x daily  Free water per MD  Consider probiotics  Monitoring diprivan, labs, tolerance to EN for adjustments as needed    Nutrition Assessment:    Pt improving from a nutritional standpoint AEB patient is tolerating EN but has increased needs with late phase covid recuperation. Remains at risk for further nutritional compromise r/t admitted with acute respiratory failure with hypoxemia, obesity, elevated Hgb A1C of 9.9,  and underlying medical condition (hx: CAD, DM, GERD, Alcohol abuse, HLD, HTN, Vitamin D deficiency).  Nutrition recommendations/interventions as per above    Malnutrition Assessment:  Malnutrition Status:  Insufficient data(+covid)    Context:  Acute Illness     Findings of the 6 clinical characteristics of malnutrition:  Energy Intake:  No significant decrease in energy intake(great appetite noted 9/14/20. NPO x 1 day)  Weight Loss:  (-14# in 7 weeks, note current weight is stated so not reliable)     Body Fat Loss:  Unable to assess(+covid patient)     Muscle Mass Loss:  Unable to assess(+covid)    Fluid Accumulation:  1 - Mild Extremities   Strength:  Not Performed    Estimated Daily Nutrient Needs:  Energy (kcal):  2100 (30/kgm IBW in late phase);  Weight Used for Energy Requirements: IBW - 70kgm     Protein (g):  ~175 (2.5 grams/kg ideal weight); Weight Used for Protein Requirements:  Ideal(70 kg)        Fluid (ml/day):  per MD;     Nutrition Related Findings:  covid; intubated 9/23; tolerating EN but now in later phase of covid illness with increased needs so will adjust TF accordingly; received 99% of Rx TF volume past 24h; +1 edema; meds include diprivan, vancomycin, vitamin D, buspar, decadron, folic acid, lasix, lantus, humalog, MVI, senna; glucose 77  BUN 20  creatinine 0.7  MAP 81; (+) flatus; last BM was x4 on 9/29; abd. soft      Wounds:  Stage III, Pressure Injury(hip - per wound care RN healing as of 9/30)       Current Nutrition Therapies:    Current Tube Feeding (TF) Orders:  · Feeding Route: Orogastric(OGT placed 9/23/20)  · Formula: Semi-Elemental(Vital 1.2 started 9/24)  · Schedule: Continuous(increasing to 35ml/hour 10/1)  · Additives/Modulars: Protein(1 2.5ounce liquid protein bottle increased to 4x daily 9/30)  · Water Flushes: per MD  · Goal TF & Flush Orders Provides: 3142 kcals (1008 TF, 416 protein modular); 2039 with diprivan lipids; 167 gms protein (63 TF, 104 modular), 93 gms cho, 4 gms fiber, 681ml free H20/24h    Additional Calorie Sources:   diprivan at 23.3ml/hour provides 615 lipid kcals    Anthropometric Measures:  · Height: 5' 8\" (172.7 cm)  · Current Body Weight: 291 lb (132 kg)(9/30 with +1 edema)   · Admission Body Weight: 285 lb (129.3 kg)(9/23/20, stated, +1 BLE and BUE edema)    · Usual Body Weight: 306 lb (138.8 kg)(EMR, 6/29/20, actual.  299# 8/15/20, bedscale.)     · Ideal Body Weight: 154 lbs;    · BMI: 44.3  · BMI Categories: Obese Class 3 (BMI 40.0 or greater)       Nutrition Diagnosis:   · Inadequate oral intake related to impaired respiratory function as evidenced by NPO or clear liquid status due to medical condition, intubation, nutrition support - enteral nutrition      Nutrition Interventions:   Food and/or Nutrient Delivery:  Continue NPO, Modify Tube Feeding, Vitamin Supplement  Nutrition Education/Counseling:  No recommendation at this time   Coordination of Nutrition Care:  Continued Inpatient Monitoring, Interdisciplinary Rounds    Goals:  Pt. will tolerate EN to meet nutrient needs during late phase of covid infection       Nutrition Monitoring and Evaluation:   Food/Nutrient Intake Outcomes:  Enteral Nutrition Intake/Tolerance  Physical Signs/Symptoms Outcomes:  Biochemical Data,

## 2020-10-01 NOTE — CARE COORDINATION
10/1/20, 1:56 PM EDT    DISCHARGE ON GOING EVALUATION    Viri Kimball day: 8  Location: -04/004-A Reason for admit: Acute respiratory failure with hypoxemia ([facility] [address] [J96.01]  Acute respiratory failure with hypoxia ([facility] [address] [J96.01]   Procedure:   9/23 CXR: Bilateral pulmonary opacification worse than on previous study dated 10 September 2020. This may represent worsening inflammatory process, possibly Covid 19 infection; borderline cardiomegaly  9/23 Intubated  9/24 CVC left subclavian - 9/30 removed  4/53 PICC right basilic  0/16 CXR: No significant change in coarse scattered bilateral infiltrates  Treatment Plan of Care: Failed SBT again  yesterday. Remains on vent w/ETT on PCMV, peep 6, FIO2 30%, sats 96%. Tmax 100.5. SB 50's. Unable to follow commands; WEISS x4 to painful stim. Cardiac monitoring, I&O, daily weight, oral care, OG w/TF, mason care. Diprivan @ 25 mcg/kg/min, allopurinol, abilify, asa, lipitor, buspar, celexa, cardizem, lovenox bid, pepcid, folic acid, po lasix daily, neurontin, inhaler, lantus, SSI Q4H, multivitamin, vit D, IV vancomycin, Electrolyte replacement protocols. Procal 0.23, alb 3.2, hgb 11.8. Barriers to Discharge: on vent   PCP: Arlene Jalloh MD  Readmission Risk Score: 40%  Patient Goals/Plan/Treatment Preferences:  From home alone and current [facility] for RN/PT/OT.  SW on case. Will need PT/OT when appropriate.

## 2020-10-02 LAB
ALBUMIN SERPL-MCNC: 3.4 G/DL (ref 3.5–5.1)
ALP BLD-CCNC: 66 U/L (ref 38–126)
ALT SERPL-CCNC: 53 U/L (ref 11–66)
ANION GAP SERPL CALCULATED.3IONS-SCNC: 13 MEQ/L (ref 8–16)
AST SERPL-CCNC: 21 U/L (ref 5–40)
BASOPHILS # BLD: 0.3 %
BASOPHILS ABSOLUTE: 0 THOU/MM3 (ref 0–0.1)
BILIRUB SERPL-MCNC: 0.4 MG/DL (ref 0.3–1.2)
BUN BLDV-MCNC: 25 MG/DL (ref 7–22)
CALCIUM SERPL-MCNC: 9.6 MG/DL (ref 8.5–10.5)
CHLORIDE BLD-SCNC: 100 MEQ/L (ref 98–111)
CO2: 30 MEQ/L (ref 23–33)
CREAT SERPL-MCNC: 0.8 MG/DL (ref 0.4–1.2)
EOSINOPHIL # BLD: 1 %
EOSINOPHILS ABSOLUTE: 0.1 THOU/MM3 (ref 0–0.4)
ERYTHROCYTE [DISTWIDTH] IN BLOOD BY AUTOMATED COUNT: 17.2 % (ref 11.5–14.5)
ERYTHROCYTE [DISTWIDTH] IN BLOOD BY AUTOMATED COUNT: 58.1 FL (ref 35–45)
GFR SERPL CREATININE-BSD FRML MDRD: > 90 ML/MIN/1.73M2
GLUCOSE BLD-MCNC: 101 MG/DL (ref 70–108)
GLUCOSE BLD-MCNC: 105 MG/DL (ref 70–108)
GLUCOSE BLD-MCNC: 133 MG/DL (ref 70–108)
GLUCOSE BLD-MCNC: 86 MG/DL (ref 70–108)
GLUCOSE BLD-MCNC: 99 MG/DL (ref 70–108)
HCT VFR BLD CALC: 37.8 % (ref 42–52)
HEMOGLOBIN: 11.8 GM/DL (ref 14–18)
IMMATURE GRANS (ABS): 0.57 THOU/MM3 (ref 0–0.07)
IMMATURE GRANULOCYTES: 4.6 %
LACTIC ACID: 1.8 MMOL/L (ref 0.5–2.2)
LYMPHOCYTES # BLD: 15.1 %
LYMPHOCYTES ABSOLUTE: 1.9 THOU/MM3 (ref 1–4.8)
MCH RBC QN AUTO: 28.6 PG (ref 26–33)
MCHC RBC AUTO-ENTMCNC: 31.2 GM/DL (ref 32.2–35.5)
MCV RBC AUTO: 91.7 FL (ref 80–94)
MONOCYTES # BLD: 11.3 %
MONOCYTES ABSOLUTE: 1.4 THOU/MM3 (ref 0.4–1.3)
NUCLEATED RED BLOOD CELLS: 0 /100 WBC
PLATELET # BLD: 361 THOU/MM3 (ref 130–400)
PMV BLD AUTO: 11.5 FL (ref 9.4–12.4)
POTASSIUM SERPL-SCNC: 3.8 MEQ/L (ref 3.5–5.2)
PROCALCITONIN: 0.17 NG/ML (ref 0.01–0.09)
RBC # BLD: 4.12 MILL/MM3 (ref 4.7–6.1)
SEG NEUTROPHILS: 67.7 %
SEGMENTED NEUTROPHILS ABSOLUTE COUNT: 8.4 THOU/MM3 (ref 1.8–7.7)
SODIUM BLD-SCNC: 143 MEQ/L (ref 135–145)
TOTAL PROTEIN: 6.5 G/DL (ref 6.1–8)
WBC # BLD: 12.4 THOU/MM3 (ref 4.8–10.8)

## 2020-10-02 PROCEDURE — 2500000003 HC RX 250 WO HCPCS

## 2020-10-02 PROCEDURE — 82948 REAGENT STRIP/BLOOD GLUCOSE: CPT

## 2020-10-02 PROCEDURE — 6370000000 HC RX 637 (ALT 250 FOR IP): Performed by: INTERNAL MEDICINE

## 2020-10-02 PROCEDURE — 36415 COLL VENOUS BLD VENIPUNCTURE: CPT

## 2020-10-02 PROCEDURE — 94761 N-INVAS EAR/PLS OXIMETRY MLT: CPT

## 2020-10-02 PROCEDURE — 6360000002 HC RX W HCPCS: Performed by: NURSE PRACTITIONER

## 2020-10-02 PROCEDURE — 80053 COMPREHEN METABOLIC PANEL: CPT

## 2020-10-02 PROCEDURE — 36592 COLLECT BLOOD FROM PICC: CPT

## 2020-10-02 PROCEDURE — 94003 VENT MGMT INPAT SUBQ DAY: CPT

## 2020-10-02 PROCEDURE — 94770 HC ETCO2 MONITOR DAILY: CPT

## 2020-10-02 PROCEDURE — 94660 CPAP INITIATION&MGMT: CPT

## 2020-10-02 PROCEDURE — 83605 ASSAY OF LACTIC ACID: CPT

## 2020-10-02 PROCEDURE — 6370000000 HC RX 637 (ALT 250 FOR IP): Performed by: NURSE PRACTITIONER

## 2020-10-02 PROCEDURE — 99291 CRITICAL CARE FIRST HOUR: CPT | Performed by: INTERNAL MEDICINE

## 2020-10-02 PROCEDURE — 85025 COMPLETE CBC W/AUTO DIFF WBC: CPT

## 2020-10-02 PROCEDURE — 84145 PROCALCITONIN (PCT): CPT

## 2020-10-02 PROCEDURE — 6360000002 HC RX W HCPCS: Performed by: HOSPITALIST

## 2020-10-02 PROCEDURE — 2700000000 HC OXYGEN THERAPY PER DAY

## 2020-10-02 PROCEDURE — 2580000003 HC RX 258: Performed by: INTERNAL MEDICINE

## 2020-10-02 PROCEDURE — 2100000000 HC CCU R&B

## 2020-10-02 PROCEDURE — 6370000000 HC RX 637 (ALT 250 FOR IP): Performed by: HOSPITALIST

## 2020-10-02 PROCEDURE — 2500000003 HC RX 250 WO HCPCS: Performed by: NURSE PRACTITIONER

## 2020-10-02 PROCEDURE — 6360000002 HC RX W HCPCS: Performed by: INTERNAL MEDICINE

## 2020-10-02 PROCEDURE — 94640 AIRWAY INHALATION TREATMENT: CPT

## 2020-10-02 PROCEDURE — 2580000003 HC RX 258: Performed by: HOSPITALIST

## 2020-10-02 RX ORDER — PANTOPRAZOLE SODIUM 40 MG/1
40 TABLET, DELAYED RELEASE ORAL
Status: DISCONTINUED | OUTPATIENT
Start: 2020-10-03 | End: 2020-10-12

## 2020-10-02 RX ADMIN — FAMOTIDINE 20 MG: 10 INJECTION INTRAVENOUS at 07:48

## 2020-10-02 RX ADMIN — FOLIC ACID 1 MG: 1 TABLET ORAL at 08:24

## 2020-10-02 RX ADMIN — Medication 2 MCG/MIN: at 16:30

## 2020-10-02 RX ADMIN — SENNOSIDES 8.6 MG: 8.6 TABLET, FILM COATED ORAL at 08:25

## 2020-10-02 RX ADMIN — Medication 15 ML: at 07:56

## 2020-10-02 RX ADMIN — VANCOMYCIN HYDROCHLORIDE 1750 MG: 5 INJECTION, POWDER, LYOPHILIZED, FOR SOLUTION INTRAVENOUS at 01:07

## 2020-10-02 RX ADMIN — ARIPIPRAZOLE 15 MG: 15 TABLET ORAL at 08:24

## 2020-10-02 RX ADMIN — SODIUM CHLORIDE, PRESERVATIVE FREE 10 ML: 5 INJECTION INTRAVENOUS at 20:14

## 2020-10-02 RX ADMIN — ACETAMINOPHEN 650 MG: 325 TABLET ORAL at 08:24

## 2020-10-02 RX ADMIN — GABAPENTIN 800 MG: 400 CAPSULE ORAL at 20:13

## 2020-10-02 RX ADMIN — ALLOPURINOL 200 MG: 100 TABLET ORAL at 08:24

## 2020-10-02 RX ADMIN — ATORVASTATIN CALCIUM 40 MG: 40 TABLET, FILM COATED ORAL at 21:29

## 2020-10-02 RX ADMIN — ENOXAPARIN SODIUM 130 MG: 150 INJECTION SUBCUTANEOUS at 07:56

## 2020-10-02 RX ADMIN — BUSPIRONE HYDROCHLORIDE 10 MG: 10 TABLET ORAL at 20:13

## 2020-10-02 RX ADMIN — PROPOFOL 20 MCG/KG/MIN: 10 INJECTION, EMULSION INTRAVENOUS at 07:48

## 2020-10-02 RX ADMIN — ASPIRIN 81 MG: 81 TABLET, CHEWABLE ORAL at 07:48

## 2020-10-02 RX ADMIN — Medication 30 ML: at 08:24

## 2020-10-02 RX ADMIN — SENNOSIDES 8.6 MG: 8.6 TABLET, FILM COATED ORAL at 20:14

## 2020-10-02 RX ADMIN — CITALOPRAM 30 MG: 20 TABLET, FILM COATED ORAL at 08:24

## 2020-10-02 RX ADMIN — FUROSEMIDE 40 MG: 40 TABLET ORAL at 08:24

## 2020-10-02 RX ADMIN — BUSPIRONE HYDROCHLORIDE 10 MG: 10 TABLET ORAL at 09:35

## 2020-10-02 RX ADMIN — GLYCOPYRROLATE AND FORMOTEROL FUMARATE 2 PUFF: 9; 4.8 AEROSOL, METERED RESPIRATORY (INHALATION) at 20:53

## 2020-10-02 RX ADMIN — ENOXAPARIN SODIUM 130 MG: 150 INJECTION SUBCUTANEOUS at 21:29

## 2020-10-02 RX ADMIN — GABAPENTIN 800 MG: 400 CAPSULE ORAL at 08:25

## 2020-10-02 RX ADMIN — VANCOMYCIN HYDROCHLORIDE 1750 MG: 5 INJECTION, POWDER, LYOPHILIZED, FOR SOLUTION INTRAVENOUS at 13:42

## 2020-10-02 RX ADMIN — SODIUM CHLORIDE, PRESERVATIVE FREE 10 ML: 5 INJECTION INTRAVENOUS at 07:56

## 2020-10-02 RX ADMIN — PROPOFOL 15 MCG/KG/MIN: 10 INJECTION, EMULSION INTRAVENOUS at 01:07

## 2020-10-02 RX ADMIN — GLYCOPYRROLATE AND FORMOTEROL FUMARATE 2 PUFF: 9; 4.8 AEROSOL, METERED RESPIRATORY (INHALATION) at 11:09

## 2020-10-02 RX ADMIN — POLYETHYLENE GLYCOL 3350 17 G: 17 POWDER, FOR SOLUTION ORAL at 07:56

## 2020-10-02 RX ADMIN — Medication 2000 UNITS: at 08:24

## 2020-10-02 ASSESSMENT — PAIN SCALES - WONG BAKER
WONGBAKER_NUMERICALRESPONSE: 0
WONGBAKER_NUMERICALRESPONSE: 0

## 2020-10-02 ASSESSMENT — PULMONARY FUNCTION TESTS
PIF_VALUE: 23
PIF_VALUE: 23

## 2020-10-02 ASSESSMENT — PAIN SCALES - GENERAL
PAINLEVEL_OUTOF10: 0

## 2020-10-02 NOTE — PROGRESS NOTES
Comprehensive Nutrition Assessment    Type and Reason for Visit:  Reassess(TF management)    Nutrition Recommendations/Plan:   Continue Vital 1.2 at 35 ml/hr  Continue 1 bottle of Proteinex 2GO 4x/day  Water flushes per MD    Nutrition Assessment:  Pt improving from a nutritional standpoint AEB patient is tolerating EN at current goal.  Remains at risk for further nutritional compromise r/t admitted with acute respiratory failure with hypoxemia, obesity, elevated Hgb A1C of 9.9,  and underlying medical condition (hx: CAD, DM, GERD, Alcohol abuse, HLD, HTN, Vitamin D deficiency).  Nutrition recommendations/interventions as above. Malnutrition Assessment:  Malnutrition Status:  Insufficient data(+covid)    Context:  Acute Illness     Findings of the 6 clinical characteristics of malnutrition:  Energy Intake:  No significant decrease in energy intake(great appetite noted 9/14/20. NPO x 1 day)  Weight Loss:  (-14# in 7 weeks, note current weight is stated so not reliable)     Body Fat Loss:  Unable to assess(+covid patient)     Muscle Mass Loss:  Unable to assess(+covid)    Fluid Accumulation:  1 - Mild Extremities   Strength:  Not Performed    Estimated Daily Nutrient Needs:  Energy (kcal):  2100 (30/kgm IBW in late phase); Weight Used for Energy Requirements:  Current(132kgm 9/30 bedscale)     Protein (g):  ~175 (2.5 grams/kg ideal weight); Weight Used for Protein Requirements:  Ideal(70 kg)        Fluid (ml/day):  per MD; Weight Used for Fluid Requirements:  (n/a)      Nutrition Related Findings:  +covid; intubated 9/23; patient is tolerating EN at goal; MAP: 81. Spoke to Jr saunders RN who reports that patient's EN is at goal, she did give bowel meds. POC: 86. Meds: vitamin D, lasix, humalog, multivitamin, senokot, colace, vanc.       Wounds:  Stage III, Pressure Injury(hip - per wound care RN healing as of 9/30)       Current Nutrition Therapies:    Current Tube Feeding (TF) Orders:  · Feeding Route:

## 2020-10-02 NOTE — PLAN OF CARE
Problem: Falls - Risk of:  Goal: Will remain free from falls  Description: Will remain free from falls  Outcome: Ongoing  Note: No falls this shift. Bed in lowest position and locked. Unable to use call light. Intubated and sedated. Bed alarm activated. Problem: Falls - Risk of:  Goal: Absence of physical injury  Outcome: Ongoing  Note: No physical injury occurred. Problem: Skin Integrity:  Goal: Will show no infection signs and symptoms  Description: Will show no infection signs and symptoms  Outcome: Ongoing  Note: No new signs of skin breakdown noted. Dimas care q shift. Sterile technique for lines. Problem: Skin Integrity:  Goal: Absence of new skin breakdown  Description: Absence of new skin breakdown  Outcome: Ongoing  Note: Turn q 2 hours and PRN. Problem: Discharge Planning:  Goal: Discharged to appropriate level of care  Description: Discharged to appropriate level of care  Outcome: Ongoing  Note: Patient unable to participate in care planning. No family at bedside. Will update family on phone. Remains on vent. Potential to extubate to Bipap today, monitoring. Problem: Urinary Retention:  Goal: Urinary elimination within specified parameters  Description: Urinary elimination within specified parameters  Outcome: Ongoing  Note: Dimas remains in place. Retention and critical care patient. Problem: MECHANICAL VENTILATION  Goal: Normal spontaneous ventilation  10/2/2020 0527 by Cecil Putnam RCP  Outcome: Ongoing  Note: Vent setting optimized to achieve target tidal volume, respiratory rate and ideal oxygen saturations. Patient ventilator settings are 22/6, RR 12, 30%. SBT will be performed when appropriate. Problem: Nutrition  Goal: Optimal nutrition therapy  Outcome: Ongoing  Note: TF at goal. Tolerating well.

## 2020-10-02 NOTE — CARE COORDINATION
10/2/20, 3:12 PM EDT    DISCHARGE ON GOING [address] day: 9  Location: -04/004-A Reason for admit: Acute respiratory failure with hypoxemia ([facility] [address] [J96.01]  Acute respiratory failure with hypoxia ([facility] [address] [J96.01]   Procedure:   9/23 CXR: Bilateral pulmonary opacification worse than on previous study dated 10 September 2020. This may represent worsening inflammatory process, possibly Covid 19 infection; borderline cardiomegaly  9/23 Intubated  9/24 CVC left subclavian - 9/30 removed  4/53 PICC right basilic  42/5 Extubated to bipap    Treatment Plan of Care: Extubated to bipap today. Sats 99% on bipap 16/8 with 80% FIO2. Tmax 100.5. SB 50's. Follows commands with BUEs; moves BLE to painful stim. Cardiac monitoring, I&O, daily weight, mason care. Allopurinol, norvasc, abilify, asa, lipitor, buspar, celexa, lovenox bid, pepcid, folic acid, po lasix daily, neurontin, inhaler, lantus, SSI ACHS, multivitamin, vit D, IV vancomycin, Electrolyte replacement protocols. Alb 3.4, wbc 12.4, hgb 11.8. Barriers to Discharge: on vent  PCP: Yanick Hernandez MD  Readmission Risk Score: 43%  Patient Goals/Plan/Treatment Preferences: From home alone and current [facility] for RN/PT/OT. SW on case. Will need PT/OT when appropriate.

## 2020-10-02 NOTE — PLAN OF CARE
Problem: MECHANICAL VENTILATION  Goal: Normal spontaneous ventilation  Outcome: Ongoing  Note: Vent setting optimized to achieve target tidal volume, respiratory rate and ideal oxygen saturations. Patient ventilator settings are 22/6, RR 12, 30%. SBT will be performed when appropriate.

## 2020-10-02 NOTE — PLAN OF CARE
Problem: Nutrition  Goal: Optimal nutrition therapy  10/2/2020 1140 by Bard Mati RD, LD  Outcome: Ongoing   Nutrition Problem #1: Inadequate oral intake  Intervention: Food and/or Nutrient Delivery: Continue NPO, Vitamin Supplement, Continue Current Tube Feeding  Nutritional Goals: Pt. will tolerate EN to meet nutrient needs during late phase of covid infection

## 2020-10-02 NOTE — PROGRESS NOTES
Patient:  Don Colin    Unit/Bed:4B-04/004-A  MRN: [id number]   PCP: Tre Ramírez MD  Date of Admission: 9/23/2020    Assessment and Plan(All pulmonary edema, renal failure, PE, and respiratory failure diagnoses are acute in nature unless otherwise specified):        1. Acute hypoxemic respiratory failure: Patient marginal on spontaneous breathing trial.  Extubate to BiPAP. 2. Acute lung injury: Radiographically improving. 3. COVID-19: Convalescent plasma received 9/24/2020.  4. Sepsis: Secondary to pneumonia and COVID-19.  5. Hypotension: Status post pressors and volume resuscitation. Significantly improved. 6. Pneumonia: Secondary to MRSA. Day #4/8 vancomycin. Radiographic improvement. 7. Type 2 diabetes mellitus: Subcutaneous insulin. 8. Diastolic heart failure: On calcium channel blocker and diuretic.  9. Obstructive sleep apnea: Secondary morbid obesity. 10. Hyperlipidemia: On atorvastatin. 11. Hypertension: Amlodipine. 12. Gout: On allopurinol. CC: Respiratory failure  HPI: Patient is a 78-year-old morbidly obese black male lifetime non-smoker. Patient has a history of morbid obesity associated with type 2 diabetes mellitus, prior alcohol abuse, hyperlipidemia, hypertension, hypogonadism, nonalcoholic steatohepatitis, obstructive sleep apnea, and gout. Patient was hospitalized 9/6/2020 through 9/15/2020 with hypoxemic respiratory failure secondary to COVID-19 associated with diffuse bilateral infiltrates. At that time, patient received Decadron, remdesivir, and danazol. During hospitalization, he had issues with atrial fibrillation. His insulin therapy required adjustment. He was discharged home on subcutaneous Lovenox. Patient returned back to the emergency room on 9/23/2020 with increasing SOB and progressive hypoxia. CXR showed diffuse infiltrates. Deteriorated and required intubation.   Patient underwent bronchoscopy on 9/27/2020 which demonstrated no mucus production. ROS: Sedated on mechanical ventilator. PMH:  Per HPI  SHX: Lifetime non-smoker  FHX: Positive for heart disease. Allergies: PCN  Medications:     norepinephrine Stopped (09/25/20 0002)    propofol 20 mcg/kg/min (10/02/20 0748)    dextrose        vancomycin  1,750 mg Intravenous Q12H    insulin lispro  0-6 Units Subcutaneous TID WC    insulin lispro  0-3 Units Subcutaneous Nightly    lidocaine 1 % injection  5 mL Intradermal Once    vancomycin (VANCOCIN) intermittent dosing (placeholder)   Other RX Placeholder    [Held by provider] insulin glargine  50 Units Subcutaneous QAM    insulin glargine  100 Units Subcutaneous Nightly    magnesium replacement protocol   Other RX Placeholder    phosphorus replacement protocol   Other RX Placeholder    calcium replacement protocol   Other RX Placeholder    multivitamin+  30 mL Oral Daily    polyethylene glycol  17 g Oral Every Other Day    allopurinol  200 mg Oral Daily    [Held by provider] amLODIPine  5 mg Oral Daily    ARIPiprazole  15 mg Oral Daily    aspirin  81 mg Oral Daily    atorvastatin  40 mg Oral Nightly    busPIRone  10 mg Oral BID    citalopram  30 mg Oral Daily    Vitamin D  2,000 Units Oral Daily    dilTIAZem  60 mg Oral BID    folic acid  1 mg Oral Daily    furosemide  40 mg Oral Daily    gabapentin  800 mg Oral BID    [Held by provider] hydrALAZINE  50 mg Oral BID    enoxaparin  1 mg/kg Subcutaneous Q12H    [Held by provider] tamsulosin  0.4 mg Oral Nightly    sodium chloride flush  10 mL Intravenous 2 times per day    chlorhexidine  15 mL Mouth/Throat BID    famotidine (PEPCID) injection  20 mg Intravenous BID    glycopyrrolate-formoterol  2 puff Inhalation BID    senna  1 tablet Oral BID       Vital Signs:   T: 100.5: P: 51 RR: 19 B/P: 90/52: FiO2: 30: O2 Sat:96: I/O: 2099/2700 GCS: 9  Body mass index is 44.38 kg/m². .  SBT:  10/6:   RR: 20: Spont TV: 350:    General:   Morbidly obese black male.   HEENT: normocephalic and atraumatic. No scleral icterus. PERR  Neck: supple. No Thyromegaly. Lungs: Crackles bilaterally. No retractions  Cardiac: RRR. No JVD. Abdomen: soft. Nontender. Large panniculus. Extremities:  No clubbing, cyanosis x 4. Trace lower extremity edema bilaterally. Vasculature: capillary refill < 3 seconds. Palpable dorsalis pedis pulses. Skin:  warm and dry. Psych: Sedated on mechanical ventilator. Lymph:  No supraclavicular adenopathy. Neurologic:  No focal deficit. No seizures. Data: (All radiographs, tracings, PFTs, and imaging are personally viewed and interpreted unless otherwise noted).  Telemetry shows sinus rhythm.  Echocardiogram shows an ejection fraction of 60%. Allyssa Hoof X-ray shows bilateral patchy infiltrates.  Sputum collected 9/23/2020: Positive for MRSA.  Sodium 143, potassium 3.8, chloride 100, bicarb 30, BUN 25, creatinine 0.8, glucose 101. Procalcitonin 0.17. White blood cell count 12.4, hemoglobin 11.8, platelets 723. .    CC time 35 minutes. Electronically signed by Cecilia Deluca M.D.

## 2020-10-02 NOTE — PROGRESS NOTES
Patient has been successfully weaned from Mechanical Ventilation. Dr Ishan Britton wrote order for pt to be extubated and go directly to bipap with setting of PS 16, peep 8 and peak pressure 24.  Pt tolerated extubation well

## 2020-10-03 ENCOUNTER — APPOINTMENT (OUTPATIENT)
Dept: GENERAL RADIOLOGY | Age: 50
DRG: 870 | End: 2020-10-03
Payer: MEDICARE

## 2020-10-03 LAB
ALBUMIN SERPL-MCNC: 3.4 G/DL (ref 3.5–5.1)
ALP BLD-CCNC: 64 U/L (ref 38–126)
ALT SERPL-CCNC: 48 U/L (ref 11–66)
ANION GAP SERPL CALCULATED.3IONS-SCNC: 9 MEQ/L (ref 8–16)
AST SERPL-CCNC: 26 U/L (ref 5–40)
BASOPHILS # BLD: 0.5 %
BASOPHILS ABSOLUTE: 0 THOU/MM3 (ref 0–0.1)
BILIRUB SERPL-MCNC: 0.5 MG/DL (ref 0.3–1.2)
BUN BLDV-MCNC: 19 MG/DL (ref 7–22)
CALCIUM SERPL-MCNC: 9.6 MG/DL (ref 8.5–10.5)
CHLORIDE BLD-SCNC: 101 MEQ/L (ref 98–111)
CO2: 31 MEQ/L (ref 23–33)
CREAT SERPL-MCNC: 0.6 MG/DL (ref 0.4–1.2)
EOSINOPHIL # BLD: 2.5 %
EOSINOPHILS ABSOLUTE: 0.2 THOU/MM3 (ref 0–0.4)
ERYTHROCYTE [DISTWIDTH] IN BLOOD BY AUTOMATED COUNT: 17 % (ref 11.5–14.5)
ERYTHROCYTE [DISTWIDTH] IN BLOOD BY AUTOMATED COUNT: 56.9 FL (ref 35–45)
GFR SERPL CREATININE-BSD FRML MDRD: > 90 ML/MIN/1.73M2
GLUCOSE BLD-MCNC: 123 MG/DL (ref 70–108)
GLUCOSE BLD-MCNC: 141 MG/DL (ref 70–108)
GLUCOSE BLD-MCNC: 142 MG/DL (ref 70–108)
GLUCOSE BLD-MCNC: 152 MG/DL (ref 70–108)
GLUCOSE BLD-MCNC: 161 MG/DL (ref 70–108)
GLUCOSE BLD-MCNC: 201 MG/DL (ref 70–108)
GLUCOSE BLD-MCNC: 212 MG/DL (ref 70–108)
HCT VFR BLD CALC: 37.9 % (ref 42–52)
HEMOGLOBIN: 11.7 GM/DL (ref 14–18)
IMMATURE GRANS (ABS): 0.49 THOU/MM3 (ref 0–0.07)
IMMATURE GRANULOCYTES: 5.1 %
LACTIC ACID: 1 MMOL/L (ref 0.5–2.2)
LYMPHOCYTES # BLD: 21.6 %
LYMPHOCYTES ABSOLUTE: 2.1 THOU/MM3 (ref 1–4.8)
MCH RBC QN AUTO: 28.7 PG (ref 26–33)
MCHC RBC AUTO-ENTMCNC: 30.9 GM/DL (ref 32.2–35.5)
MCV RBC AUTO: 92.9 FL (ref 80–94)
MONOCYTES # BLD: 9.7 %
MONOCYTES ABSOLUTE: 0.9 THOU/MM3 (ref 0.4–1.3)
NUCLEATED RED BLOOD CELLS: 0 /100 WBC
PLATELET # BLD: 359 THOU/MM3 (ref 130–400)
PMV BLD AUTO: 11 FL (ref 9.4–12.4)
POTASSIUM SERPL-SCNC: 3.8 MEQ/L (ref 3.5–5.2)
PROCALCITONIN: 0.13 NG/ML (ref 0.01–0.09)
RBC # BLD: 4.08 MILL/MM3 (ref 4.7–6.1)
SEG NEUTROPHILS: 60.6 %
SEGMENTED NEUTROPHILS ABSOLUTE COUNT: 5.8 THOU/MM3 (ref 1.8–7.7)
SODIUM BLD-SCNC: 141 MEQ/L (ref 135–145)
TOTAL PROTEIN: 6.3 G/DL (ref 6.1–8)
VANCOMYCIN TROUGH: 14.5 UG/ML (ref 5–15)
WBC # BLD: 9.6 THOU/MM3 (ref 4.8–10.8)

## 2020-10-03 PROCEDURE — 99291 CRITICAL CARE FIRST HOUR: CPT | Performed by: INTERNAL MEDICINE

## 2020-10-03 PROCEDURE — 6370000000 HC RX 637 (ALT 250 FOR IP): Performed by: HOSPITALIST

## 2020-10-03 PROCEDURE — 2100000000 HC CCU R&B

## 2020-10-03 PROCEDURE — 83605 ASSAY OF LACTIC ACID: CPT

## 2020-10-03 PROCEDURE — 6360000002 HC RX W HCPCS: Performed by: HOSPITALIST

## 2020-10-03 PROCEDURE — 80053 COMPREHEN METABOLIC PANEL: CPT

## 2020-10-03 PROCEDURE — 94660 CPAP INITIATION&MGMT: CPT

## 2020-10-03 PROCEDURE — 2700000000 HC OXYGEN THERAPY PER DAY

## 2020-10-03 PROCEDURE — 82948 REAGENT STRIP/BLOOD GLUCOSE: CPT

## 2020-10-03 PROCEDURE — 6370000000 HC RX 637 (ALT 250 FOR IP): Performed by: NURSE PRACTITIONER

## 2020-10-03 PROCEDURE — 6360000002 HC RX W HCPCS: Performed by: INTERNAL MEDICINE

## 2020-10-03 PROCEDURE — 94640 AIRWAY INHALATION TREATMENT: CPT

## 2020-10-03 PROCEDURE — 6370000000 HC RX 637 (ALT 250 FOR IP): Performed by: INTERNAL MEDICINE

## 2020-10-03 PROCEDURE — 71045 X-RAY EXAM CHEST 1 VIEW: CPT

## 2020-10-03 PROCEDURE — 80202 ASSAY OF VANCOMYCIN: CPT

## 2020-10-03 PROCEDURE — 94761 N-INVAS EAR/PLS OXIMETRY MLT: CPT

## 2020-10-03 PROCEDURE — 85025 COMPLETE CBC W/AUTO DIFF WBC: CPT

## 2020-10-03 PROCEDURE — 84145 PROCALCITONIN (PCT): CPT

## 2020-10-03 PROCEDURE — 36415 COLL VENOUS BLD VENIPUNCTURE: CPT

## 2020-10-03 PROCEDURE — 2580000003 HC RX 258: Performed by: INTERNAL MEDICINE

## 2020-10-03 PROCEDURE — 2580000003 HC RX 258: Performed by: HOSPITALIST

## 2020-10-03 RX ORDER — INSULIN GLARGINE 100 [IU]/ML
25 INJECTION, SOLUTION SUBCUTANEOUS NIGHTLY
Status: DISCONTINUED | OUTPATIENT
Start: 2020-10-03 | End: 2020-10-05

## 2020-10-03 RX ADMIN — PANTOPRAZOLE SODIUM 40 MG: 40 TABLET, DELAYED RELEASE ORAL at 05:48

## 2020-10-03 RX ADMIN — INSULIN LISPRO 2 UNITS: 100 INJECTION, SOLUTION INTRAVENOUS; SUBCUTANEOUS at 11:47

## 2020-10-03 RX ADMIN — INSULIN GLARGINE 25 UNITS: 100 INJECTION, SOLUTION SUBCUTANEOUS at 22:30

## 2020-10-03 RX ADMIN — GABAPENTIN 800 MG: 400 CAPSULE ORAL at 21:24

## 2020-10-03 RX ADMIN — ATORVASTATIN CALCIUM 40 MG: 40 TABLET, FILM COATED ORAL at 21:24

## 2020-10-03 RX ADMIN — VANCOMYCIN HYDROCHLORIDE 1750 MG: 5 INJECTION, POWDER, LYOPHILIZED, FOR SOLUTION INTRAVENOUS at 01:00

## 2020-10-03 RX ADMIN — GABAPENTIN 800 MG: 400 CAPSULE ORAL at 08:24

## 2020-10-03 RX ADMIN — VANCOMYCIN HYDROCHLORIDE 1750 MG: 5 INJECTION, POWDER, LYOPHILIZED, FOR SOLUTION INTRAVENOUS at 13:00

## 2020-10-03 RX ADMIN — SODIUM CHLORIDE, PRESERVATIVE FREE 10 ML: 5 INJECTION INTRAVENOUS at 08:24

## 2020-10-03 RX ADMIN — FOLIC ACID 1 MG: 1 TABLET ORAL at 08:25

## 2020-10-03 RX ADMIN — FUROSEMIDE 40 MG: 40 TABLET ORAL at 08:24

## 2020-10-03 RX ADMIN — ASPIRIN 81 MG: 81 TABLET, CHEWABLE ORAL at 08:26

## 2020-10-03 RX ADMIN — ARIPIPRAZOLE 15 MG: 15 TABLET ORAL at 08:24

## 2020-10-03 RX ADMIN — SENNOSIDES 8.6 MG: 8.6 TABLET, FILM COATED ORAL at 21:26

## 2020-10-03 RX ADMIN — ENOXAPARIN SODIUM 130 MG: 150 INJECTION SUBCUTANEOUS at 08:23

## 2020-10-03 RX ADMIN — CITALOPRAM 30 MG: 20 TABLET, FILM COATED ORAL at 08:24

## 2020-10-03 RX ADMIN — Medication 2000 UNITS: at 08:25

## 2020-10-03 RX ADMIN — ALLOPURINOL 200 MG: 100 TABLET ORAL at 08:24

## 2020-10-03 RX ADMIN — SODIUM CHLORIDE, PRESERVATIVE FREE 10 ML: 5 INJECTION INTRAVENOUS at 21:24

## 2020-10-03 RX ADMIN — INSULIN LISPRO 2 UNITS: 100 INJECTION, SOLUTION INTRAVENOUS; SUBCUTANEOUS at 17:03

## 2020-10-03 RX ADMIN — GLYCOPYRROLATE AND FORMOTEROL FUMARATE 2 PUFF: 9; 4.8 AEROSOL, METERED RESPIRATORY (INHALATION) at 10:01

## 2020-10-03 RX ADMIN — GLYCOPYRROLATE AND FORMOTEROL FUMARATE 2 PUFF: 9; 4.8 AEROSOL, METERED RESPIRATORY (INHALATION) at 21:15

## 2020-10-03 RX ADMIN — ENOXAPARIN SODIUM 130 MG: 150 INJECTION SUBCUTANEOUS at 21:24

## 2020-10-03 RX ADMIN — ALBUTEROL SULFATE 5 MG: 2.5 SOLUTION RESPIRATORY (INHALATION) at 04:34

## 2020-10-03 ASSESSMENT — PAIN SCALES - GENERAL
PAINLEVEL_OUTOF10: 0
PAINLEVEL_OUTOF10: 5
PAINLEVEL_OUTOF10: 8
PAINLEVEL_OUTOF10: 0

## 2020-10-03 ASSESSMENT — PAIN DESCRIPTION - DESCRIPTORS: DESCRIPTORS: ACHING

## 2020-10-03 ASSESSMENT — PAIN DESCRIPTION - FREQUENCY: FREQUENCY: INTERMITTENT

## 2020-10-03 ASSESSMENT — PAIN DESCRIPTION - ONSET: ONSET: ON-GOING

## 2020-10-03 ASSESSMENT — PAIN DESCRIPTION - PAIN TYPE: TYPE: ACUTE PAIN

## 2020-10-03 ASSESSMENT — PAIN DESCRIPTION - ORIENTATION: ORIENTATION: RIGHT;LEFT

## 2020-10-03 ASSESSMENT — PAIN DESCRIPTION - PROGRESSION
CLINICAL_PROGRESSION: GRADUALLY IMPROVING
CLINICAL_PROGRESSION: NOT CHANGED

## 2020-10-03 ASSESSMENT — PAIN - FUNCTIONAL ASSESSMENT: PAIN_FUNCTIONAL_ASSESSMENT: PREVENTS OR INTERFERES SOME ACTIVE ACTIVITIES AND ADLS

## 2020-10-03 ASSESSMENT — PAIN DESCRIPTION - LOCATION: LOCATION: ARM

## 2020-10-03 NOTE — PROGRESS NOTES
Pharmacy Vancomycin Consult     Vancomycin Day: 5  Current Dosin,750 mg IV q12h    Temp max:  98.5    Recent Labs     10/02/20  0509 10/03/20  0348   BUN 25* 19       Recent Labs     10/02/20  0509 10/03/20  0348   CREATININE 0.8 0.6       Recent Labs     10/02/20  0509 10/03/20  0348   WBC 12.4* 9.6         Intake/Output Summary (Last 24 hours) at 10/3/2020 1429  Last data filed at 10/3/2020 0933  Gross per 24 hour   Intake 1825.36 ml   Output 2250 ml   Net -424.64 ml       Culture Date      Source                       Results  2020          respiratory                 MRSA       Ht Readings from Last 1 Encounters:   20 5' 8\" (1.727 m)        Wt Readings from Last 1 Encounters:   10/03/20 282 lb 10.1 oz (128.2 kg)         Body mass index is 42.97 kg/m². Estimated Creatinine Clearance: 190 mL/min (based on SCr of 0.6 mg/dL). Trough: 14.5    Assessment/Plan:  Plan to continue current dose of vancomycin at this time. Vanc trough close to goal of 15, no change at this time. Continue to follow renal fxn.      Era Treviño, PharmD, BCPS   10/3/2020  2:38 PM

## 2020-10-03 NOTE — PLAN OF CARE
Problem: Impaired respiratory status  Goal: Patient will achieve/maintain normal respiratory rate/effort  Outcome: Ongoing   Pt remains on bipap at this time per order. Seems to be tolerating well. Problem: Impaired respiratory status  Goal: Clear lung sounds  Outcome: Ongoing   Breath sounds are clear and diminished at this time. Continue for lung maintenance.

## 2020-10-03 NOTE — PLAN OF CARE
Problem: Serum Glucose Level - Abnormal:  Goal: Ability to maintain appropriate glucose levels will improve  Description: Ability to maintain appropriate glucose levels will improve  Outcome: Ongoing  Note: BS checked ACHS. SSI and Lantus for coverage     Problem: Urinary Elimination:  Goal: Signs and symptoms of infection will decrease  Description: Signs and symptoms of infection will decrease  Outcome: Ongoing  Note: No s/s of infection r/t mason. Mason care done once per shift and PRN     Problem: Falls - Risk of:  Goal: Will remain free from falls  Description: Will remain free from falls  Outcome: Ongoing  Note: No falls this shift. Hourly rounding in effect. Bed alarm on. Problem: Falls - Risk of:  Goal: Absence of physical injury  Outcome: Ongoing  Note: No injury this shift     Problem: Skin Integrity:  Goal: Will show no infection signs and symptoms  Description: Will show no infection signs and symptoms  Outcome: Ongoing  Note: No infection r/t to skin integrity     Problem: Skin Integrity:  Goal: Absence of new skin breakdown  Description: Absence of new skin breakdown  Outcome: Ongoing  Note: No new skin breakdown .  Pt repositioned every two hours     Problem: Discharge Planning:  Goal: Discharged to appropriate level of care  Description: Discharged to appropriate level of care  Outcome: Ongoing  Note: Discharge based upon patients clinical course     Problem: Urinary Retention:  Goal: Urinary elimination within specified parameters  Description: Urinary elimination within specified parameters  Outcome: Ongoing  Note: Pt with adequate urine output     Problem: Nutrition  Goal: Optimal nutrition therapy  Outcome: Ongoing  Note: Pt on full liquid diet     Problem: Impaired respiratory status  Goal: Patient will achieve/maintain normal respiratory rate/effort  10/3/2020 0629 by Jennyfer Cao RN  Outcome: Ongoing  Note: Pt tolerating BiPAP well     Problem: Impaired respiratory status  Goal: Clear lung sounds  10/3/2020 0629 by Ilir Malagon RN  Outcome: Ongoing  Note: Pt lung sounds clear and diminished     Care plan reviewed with patient. Patient verbalizes understanding of the plan of care and contributes to goal setting.

## 2020-10-03 NOTE — PLAN OF CARE
Problem: Falls - Risk of:  Goal: Will remain free from falls  Description: Will remain free from falls  10/3/2020 1018 by Han Rushing RN  Outcome: Ongoing  Note: No falls this shift. Bed in lowest position and locked. Call light within reach. Bed alarm activated. Problem: Falls - Risk of:  Goal: Absence of physical injury  10/3/2020 1018 by Han Rushing RN  Outcome: Ongoing  Note: No physical injury occurred. Problem: Skin Integrity:  Goal: Will show no infection signs and symptoms  Description: Will show no infection signs and symptoms  10/3/2020 1018 by Han Rushing RN  Outcome: Ongoing  Note: No new skin breakdown noted. Turn q 2 hours and PRN. Problem: Skin Integrity:  Goal: Absence of new skin breakdown  Description: Absence of new skin breakdown  10/3/2020 1018 by Han Rushing RN  Outcome: Ongoing  Note: No new skin breakdown noted. Problem: Discharge Planning:  Goal: Discharged to appropriate level of care  Description: Discharged to appropriate level of care  10/3/2020 1018 by Han Rushing RN  Outcome: Ongoing  Note: Care plan reviewed with patient. Patient verbalize understanding of the plan of care and contribute to goal setting. Problem: Urinary Retention:  Goal: Urinary elimination within specified parameters  Description: Urinary elimination within specified parameters  10/3/2020 1018 by Han Rushing RN  Outcome: Ongoing  Note: Dimas in place. Retention. Problem: Nutrition  Goal: Optimal nutrition therapy  10/3/2020 1018 by Han Rushing RN  Outcome: Ongoing  Note: Tolerating full liquids     Problem: Impaired respiratory status  Goal: Patient will achieve/maintain normal respiratory rate/effort  10/3/2020 1018 by Han Rushing RN  Outcome: Ongoing  Note: BiPap. Patient tires when on NC to eat at times.       Problem: Impaired respiratory status  Goal: Clear lung sounds  10/3/2020 1018 by Han Rushing RN  Outcome: Ongoing  Note: Scattered rhonchi Problem: Serum Glucose Level - Abnormal:  Goal: Ability to maintain appropriate glucose levels will improve  Description: Ability to maintain appropriate glucose levels will improve  10/3/2020 1018 by Glen Preston RN  Outcome: Ongoing  Note: Chems per order. SSI.

## 2020-10-03 NOTE — PROGRESS NOTES
Intensive Care Unit  Intensivist Progress Note    Patient: Alba Mitchell  : 1970  MRN#: [id number]  10/3/2020 11:47 AM  ADMISSION DAY:  2020  9:02 AM     Subjective:  Patient was extubated successfully on 10/2. Afebrile today. On small dose of norepinephrine. BiPAP was weaned to nasal cannula 3 L/min to have breakfast then placed back on BiPAP. HPI: Patient is a 78-year-old morbidly obese black male lifetime non-smoker. Patient has a history of morbid obesity associated with type 2 diabetes mellitus, prior alcohol abuse, hyperlipidemia, hypertension, hypogonadism, nonalcoholic steatohepatitis, obstructive sleep apnea, and gout. Patient was hospitalized 2020 through 9/15/2020 with hypoxemic respiratory failure secondary to COVID-19 associated with diffuse bilateral infiltrates. At that time, patient received Decadron, remdesivir, and danazol. During hospitalization, he had issues with atrial fibrillation. His insulin therapy required adjustment. He was discharged home on subcutaneous Lovenox. Patient returned back to the emergency room on 2020 with increasing SOB and progressive hypoxia. CXR showed diffuse infiltrates. Deteriorated and required intubation. Patient underwent bronchoscopy on 2020 which demonstrated no mucus production.     Patient Vitals for the past 8 hrs:   BP Temp Temp src Pulse Resp SpO2 Weight   10/03/20 1003 118/85 -- -- 61 19 97 % --   10/03/20 1000 -- -- -- -- -- 98 % --   10/03/20 0933 100/61 -- -- 56 17 98 % --   10/03/20 0903 139/68 -- -- 53 14 100 % --   10/03/20 0833 (!) 120/48 98.2 °F (36.8 °C) Oral 59 12 90 % --   10/03/20 0803 (!) 79/33 -- -- 51 25 92 % --   10/03/20 0733 (!) 99/57 -- -- 56 17 97 % --   10/03/20 0700 (!) 94/50 -- -- (!) 46 19 96 % --   10/03/20 0600 120/64 -- -- (!) 46 19 97 % --   10/03/20 0554 -- -- -- -- -- -- 282 lb 10.1 oz (128.2 kg)   10/03/20 0500 (!) 92/44 -- -- (!) 47 22 95 % --   10/03/20 0435 -- -- -- -- 12 -- -- 10/03/20 0434 -- -- -- -- 13 96 % --   10/03/20 0400 (!) 89/50 97.9 °F (36.6 °C) Oral (!) 49 24 99 % --       EXAM:  General:   Morbidly obese black male. HEENT:  normocephalic and atraumatic. No scleral icterus. PERR  Neck: supple. No Thyromegaly. Lungs: Crackles bilaterally. No retractions  Cardiac: RRR. No JVD. Abdomen: soft. Nontender. Large panniculus. Extremities:  No clubbing, cyanosis x 4. Trace lower extremity edema bilaterally. Vasculature: capillary refill < 3 seconds. Palpable dorsalis pedis pulses. Skin:  warm and dry. Psych: Sedated on mechanical ventilator. Lymph:  No supraclavicular adenopathy. Neurologic:  No focal deficit. No seizures. Intake/Output Summary (Last 24 hours) at 10/3/2020 1147  Last data filed at 10/3/2020 0933  Gross per 24 hour   Intake 2141.36 ml   Output 3100 ml   Net -958.64 ml     I/O last 3 completed shifts: In: 1791.4 [P.O.:300; I.V.:1491.4]  Out: 3100 [Urine:3100]   Date 10/03/20 0000 - 10/03/20 2359   Shift 9054-4676 2269-4432 8216-9224 24 Hour Total   INTAKE   P.O.(mL/kg/hr) 200(0.2) 350  550   I. V.(mL/kg) 584. 8(4.6)   584. 8(4.6)   Shift Total(mL/kg) 784. 8(6.1) 350(2.7)  1134.8(8.9)   OUTPUT   Urine(mL/kg/hr) 1000(1)   1000   Shift Total(mL/kg) 1000(7.8)   1000(7.8)   Weight (kg) 128.2 128.2 128.2 128. 2     Wt Readings from Last 3 Encounters:   10/03/20 282 lb 10.1 oz (128.2 kg)   09/15/20 281 lb 6.4 oz (127.6 kg)   08/21/20 (!) 306 lb 6.4 oz (139 kg)      Body mass index is 42.97 kg/m².          Scheduled Meds:   pantoprazole  40 mg Oral QAM AC    vancomycin  1,750 mg Intravenous Q12H    insulin lispro  0-6 Units Subcutaneous TID WC    insulin lispro  0-3 Units Subcutaneous Nightly    lidocaine 1 % injection  5 mL Intradermal Once    vancomycin (VANCOCIN) intermittent dosing (placeholder)   Other RX Placeholder    insulin glargine  100 Units Subcutaneous Nightly    magnesium replacement protocol   Other RX Placeholder    phosphorus replacement protocol   Other RX Placeholder    calcium replacement protocol   Other RX Placeholder    multivitamin+  30 mL Oral Daily    polyethylene glycol  17 g Oral Every Other Day    allopurinol  200 mg Oral Daily    amLODIPine  5 mg Oral Daily    ARIPiprazole  15 mg Oral Daily    aspirin  81 mg Oral Daily    atorvastatin  40 mg Oral Nightly    [Held by provider] busPIRone  10 mg Oral BID    citalopram  30 mg Oral Daily    Vitamin D  2,000 Units Oral Daily    folic acid  1 mg Oral Daily    furosemide  40 mg Oral Daily    gabapentin  800 mg Oral BID    [Held by provider] hydrALAZINE  50 mg Oral BID    enoxaparin  1 mg/kg Subcutaneous Q12H    [Held by provider] tamsulosin  0.4 mg Oral Nightly    sodium chloride flush  10 mL Intravenous 2 times per day    glycopyrrolate-formoterol  2 puff Inhalation BID    senna  1 tablet Oral BID     Continuous Infusions:   norepinephrine 4 mcg/min (10/03/20 0803)    dextrose       PRN Meds:potassium chloride, 20 mEq, PRN  sodium chloride flush, 10 mL, PRN  lidocaine, , PRN  sodium chloride flush, 10 mL, PRN  acetaminophen, 650 mg, Q6H PRN  polyethylene glycol, 17 g, Daily PRN  promethazine, 12.5 mg, Q6H PRN    Or  ondansetron, 4 mg, Q6H PRN  albuterol, 5 mg, Q4H PRN  glucose, 15 g, PRN  dextrose, 12.5 g, PRN  glucagon (rDNA), 1 mg, PRN  dextrose, 100 mL/hr, PRN        PARENTERAL VASOACTIVE / INOTROPIC AGENTS:    SEDATION/ANALGESIA:      ICU PROPHYLAXIS/THERAPY:   Stress ulcer: [] PPI Agent  [] H2RA  [] Sucralfate [] Other:   VTE: [] Enoxaparin     [] Warfarin  [] NOAC     [] PCD Device:Bilat LE           [] Heparin: [] Subcut / [] IV    NUTRITION SUPPORT:    SUPPORT DEVICES:    Oxygen Delivery - O2 Flow Rate (L/min): 3 L/min(weaned to 2)  VENT SETTINGS (Comprehensive)  Vent Information  $Ventilation: $Subsequent Day  Skin Assessment: Clean, dry, & intact  Suction Catheter Diameter: 14  Equipment ID: [id number]  Equipment Changed: Mask(attempted performa mask did not fit pt well )  Vent Type: C2G5 Dada Room  Vent Mode: (p-cmv)  Vt Ordered: 0 mL  Pressure Ordered: 22(pcontrol 16)  Rate Set: 12 bmp  Peak Flow: 46 L/min  Pressure Support: 10 cmH20  FiO2 : 30 %  SpO2: 97 %  SpO2/FiO2 ratio: 330  Sensitivity: 3  PEEP/CPAP: 6  I Time/ I Time %: 1 s  Humidification Source: Heated wire  Humidification Temp: 37  Humidification Temp Measured: 37  Circuit Condensation: Drained  Nitric Oxide/Epoprostenol In Use?: No  Mask Type: Full face mask  Mask Size: Large  Additional Respiratory  Assessments  Pulse: 61  Resp: 19  SpO2: 97 %  $End Tidal CO2: 41  pCO2 (TCOM, mmHg): 34 mmHg  Position: Semi-Clayton's  Humidification Source: Heated wire  Humidification Temp: 37  Circuit Condensation: Drained  Oral Care: Mouth swabbed, Lip moisturizer applied, Mouth moisturizer, Mouth suctioned  Subglottic Suction Done?: Yes      DATA:    CBC:   Recent Labs     10/01/20  0558 10/02/20  0509 10/03/20  0348   WBC 10.5 12.4* 9.6   RBC 4.13* 4.12* 4.08*   HGB 11.8* 11.8* 11.7*   HCT 37.8* 37.8* 37.9*   MCV 91.5 91.7 92.9   MCH 28.6 28.6 28.7   MCHC 31.2* 31.2* 30.9*    361 359   MPV 11.7 11.5 11.0      BMP/CMP:   Recent Labs     10/01/20  0558 10/02/20  0509 10/03/20  0348    143 141   K 3.7 3.8 3.8    100 101   CO2 30 30 31   ANIONGAP 11.0 13.0 9.0   BUN 20 25* 19   CREATININE 0.7 0.8 0.6   GLUCOSE 117* 101 161*   CALCIUM 9.5 9.6 9.6   PROT 6.2 6.5 6.3   LABALBU 3.2* 3.4* 3.4*   BILITOT 0.3 0.4 0.5   ALKPHOS 64 66 64   AST 24 21 26   ALT 61 53 48      Other Electrolytes:   No results for input(s): CAION, PHOS, MG in the last 72 hours. Serum Osmolality  No results for input(s): OSMOMEASER in the last 72 hours. Procalcitonin:  Recent Labs     10/01/20  0558 10/02/20  0509 10/03/20  0348   PROCAL 0.23* 0.17* 0.13*     Cardiac: No results for input(s): TROPONINT in the last 72 hours. Lipids: No results for input(s): CHOL, HDL in the last 72 hours.     Invalid input(s): LDLCALCU  Coagulation: No results for input(s): INR in the last 72 hours. Lactic Acid:   Recent Labs     10/01/20  0558 10/02/20  0509 10/03/20  0348   LACTA 1.2 1.8 1.0      ABGs:   Lab Results   Component Value Date    PH 7.34 09/23/2020    PCO2 56 09/23/2020    PO2 83 09/23/2020    HCO3 30 09/23/2020    O2SAT 95 09/23/2020     Lab Results   Component Value Date    IFIO2 5 09/23/2020    MODE BiLevel 08/05/2020    SETPEEP 8.0 08/05/2020       Radiology/Imaging:  XR CHEST PORTABLE [[id number]]  Collected: 10/03/20 0415        Order Status: Completed  Updated: 10/03/20 0516       Narrative:         Chest X-ray, 1 View     COMPARISON: Chest x-ray performed 09/30/2020. FINDINGS:   Right-sided PICC line in place with tip projecting over the cavoatrial   junction. Endotracheal tube and left central line have been removed. Slightly decreased diffuse patchy bilateral airspace disease. No pleural effusion. No pneumothorax. No cardiomegaly. No acute fracture.       Impression:         Slightly decreased diffuse patchy bilateral airspace disease. Support devices as above. This document has been electronically signed by: Colt Clarke MD on   10/03/2020 05:15 AM       XR CHEST PORTABLE [[id number]]  Collected: 09/30/20 0550       Order Status: Completed  Updated: 09/30/20 0656       Narrative:         XR CHEST PORTABLE     Exam Date and Exam Time: 09/30/2020 04:39 AM     Accession: [id number]     Reason for exam: resp failure     Ordering Diagnosis: Acute respiratory failure with hypoxemia ([address] and   Acute respiratory failure with hypoxia ([address]       Comparison:  [facility]  - XR CHEST PORTABLE  - 09/27/2020 01:20 PM EDT     Findings:   ET tube low lying, tip at the madi. Left central line tip over the medial aspect left innominate vein, stable. No significant change in coarse scattered bilateral infiltrates. Heart size normal.   No pneumothorax or effusion.    No acute chest wall pathology.       Impression:         ET lobe and lingula. No pleural effusion or pneumothorax. Heart size is normal.   No pulmonary vascular congestion. No acute fracture.       Impression:         1.  Endotracheal tube terminates 3.1 cm above the madi. 2.  Orogastric tube in the stomach. 3.  Patchy opacities in the right upper lobe and lingula. This document has been electronically signed by: Adrián Wagner MD on   09/24/2020 12:35 AM       XR CHEST PORTABLE [[id number]]  Resulted: 09/23/20 1047       Order Status: Completed  Updated: 09/23/20 1049       Narrative:         PROCEDURE: XR CHEST PORTABLE     CLINICAL INFORMATION: sob. COMPARISON: Chest x-ray dated 10 September 2020     TECHNIQUE: AP upright view of the chest.     FINDINGS:   There is extensive bilateral pulmonary opacification, worse than on previous study dated 10 September 2020. These findings may represent inflammatory process. There are no effusions. There is borderline cardiomegaly.         Impression:         1. Bilateral pulmonary opacification worse than on previous study dated 10 September 2020. This may represent worsening inflammatory process, possibly Covid 19 infection. Please correlate clinically   2. Borderline cardiomegaly. **This report has been created using voice recognition software. It may contain minor errors which are inherent in voice recognition technology. **     Final report electronically signed by DR Debra Silverman on 9/23/2020 10:47 AM             Patient Active Problem List   Diagnosis    Chronic diastolic congestive heart failure (HCC)    Atrial fibrillation (HCC)    BPH (benign prostatic hyperplasia)    Carpal tunnel syndrome on right    Chronic gout    DM2 (diabetes mellitus, type 2) ([address]    Heart failure with preserved ejection fraction ([address]    History of alcohol abuse    History of osteomyelitis    Hypertension, essential    Hypogonadism, male    Major depression    Morbid obesity ([address]    DURAN (nonalcoholic steatohepatitis)    KIARA (obstructive sleep apnea)    Vitamin D deficiency    Normocytic anemia    Physical deconditioning    Mood disorder (HCC)    Hallucinations    GERD (gastroesophageal reflux disease)    Wound of buttock    Chest wall pain with tenderness    Primary osteoarthritis of left hip    COVID-19    COVID-19 virus infection    Acute respiratory failure with hypoxia (HCC)       Assessment and Plan:    · Acute hypoxemic respiratory failure:  Patient was extubated on 10/2 to BiPAP. He continues to be on BiPAP till today. We weaned him today to 3 L/min nasal cannula. BiPAP as needed during the day and at night. · COVID-19 pneumonia/infection: Convalescent plasma received 9/24/2020. · Sepsis: Secondary to pneumonia and COVID-19. Requiring small dose of norepinephrine. · Hypotension: Status post pressors and volume resuscitation. Wean pressors off. · MRSA bilateral pneumonia: Secondary to MRSA. Day #5/8 vancomycin. · Type 2 diabetes mellitus: Subcutaneous insulin. · Diastolic heart failure: On calcium channel blocker and diuretic. · Obstructive sleep apnea: Secondary morbid obesity. · Hyperlipidemia: On atorvastatin. · Hypertension: Amlodipine.   · Gout: On allopurinol  Ccm:33  Mary Mcgrath MD

## 2020-10-03 NOTE — PLAN OF CARE
Patient continues on inhaler; tolerating well.   Pt ordered on bipap ATC; pt eating breakfast at this time

## 2020-10-04 LAB
ALBUMIN SERPL-MCNC: 3.3 G/DL (ref 3.5–5.1)
ALP BLD-CCNC: 65 U/L (ref 38–126)
ALT SERPL-CCNC: 37 U/L (ref 11–66)
ANION GAP SERPL CALCULATED.3IONS-SCNC: 10 MEQ/L (ref 8–16)
AST SERPL-CCNC: 19 U/L (ref 5–40)
BASOPHILS # BLD: 0.4 %
BASOPHILS ABSOLUTE: 0.1 THOU/MM3 (ref 0–0.1)
BILIRUB SERPL-MCNC: 0.5 MG/DL (ref 0.3–1.2)
BUN BLDV-MCNC: 14 MG/DL (ref 7–22)
CALCIUM SERPL-MCNC: 9.1 MG/DL (ref 8.5–10.5)
CHLORIDE BLD-SCNC: 98 MEQ/L (ref 98–111)
CO2: 31 MEQ/L (ref 23–33)
CREAT SERPL-MCNC: 0.8 MG/DL (ref 0.4–1.2)
EOSINOPHIL # BLD: 1.4 %
EOSINOPHILS ABSOLUTE: 0.2 THOU/MM3 (ref 0–0.4)
ERYTHROCYTE [DISTWIDTH] IN BLOOD BY AUTOMATED COUNT: 16.3 % (ref 11.5–14.5)
ERYTHROCYTE [DISTWIDTH] IN BLOOD BY AUTOMATED COUNT: 54.6 FL (ref 35–45)
GFR SERPL CREATININE-BSD FRML MDRD: > 90 ML/MIN/1.73M2
GLUCOSE BLD-MCNC: 158 MG/DL (ref 70–108)
GLUCOSE BLD-MCNC: 182 MG/DL (ref 70–108)
GLUCOSE BLD-MCNC: 192 MG/DL (ref 70–108)
GLUCOSE BLD-MCNC: 224 MG/DL (ref 70–108)
GLUCOSE BLD-MCNC: 245 MG/DL (ref 70–108)
HCT VFR BLD CALC: 34.1 % (ref 42–52)
HEMOGLOBIN: 10.6 GM/DL (ref 14–18)
IMMATURE GRANS (ABS): 0.21 THOU/MM3 (ref 0–0.07)
IMMATURE GRANULOCYTES: 1.6 %
LACTIC ACID: 0.8 MMOL/L (ref 0.5–2.2)
LYMPHOCYTES # BLD: 13.4 %
LYMPHOCYTES ABSOLUTE: 1.8 THOU/MM3 (ref 1–4.8)
MCH RBC QN AUTO: 28.7 PG (ref 26–33)
MCHC RBC AUTO-ENTMCNC: 31.1 GM/DL (ref 32.2–35.5)
MCV RBC AUTO: 92.4 FL (ref 80–94)
MONOCYTES # BLD: 8.2 %
MONOCYTES ABSOLUTE: 1.1 THOU/MM3 (ref 0.4–1.3)
NUCLEATED RED BLOOD CELLS: 0 /100 WBC
PLATELET # BLD: 315 THOU/MM3 (ref 130–400)
PMV BLD AUTO: 11.8 FL (ref 9.4–12.4)
POTASSIUM SERPL-SCNC: 3.6 MEQ/L (ref 3.5–5.2)
PROCALCITONIN: 0.17 NG/ML (ref 0.01–0.09)
RBC # BLD: 3.69 MILL/MM3 (ref 4.7–6.1)
SEG NEUTROPHILS: 75 %
SEGMENTED NEUTROPHILS ABSOLUTE COUNT: 9.9 THOU/MM3 (ref 1.8–7.7)
SODIUM BLD-SCNC: 139 MEQ/L (ref 135–145)
TOTAL PROTEIN: 5.9 G/DL (ref 6.1–8)
WBC # BLD: 13.2 THOU/MM3 (ref 4.8–10.8)

## 2020-10-04 PROCEDURE — 82948 REAGENT STRIP/BLOOD GLUCOSE: CPT

## 2020-10-04 PROCEDURE — 6370000000 HC RX 637 (ALT 250 FOR IP): Performed by: INTERNAL MEDICINE

## 2020-10-04 PROCEDURE — 84145 PROCALCITONIN (PCT): CPT

## 2020-10-04 PROCEDURE — 6370000000 HC RX 637 (ALT 250 FOR IP): Performed by: NURSE PRACTITIONER

## 2020-10-04 PROCEDURE — 2500000003 HC RX 250 WO HCPCS: Performed by: INTERNAL MEDICINE

## 2020-10-04 PROCEDURE — 2100000000 HC CCU R&B

## 2020-10-04 PROCEDURE — 94640 AIRWAY INHALATION TREATMENT: CPT

## 2020-10-04 PROCEDURE — 6360000002 HC RX W HCPCS: Performed by: HOSPITALIST

## 2020-10-04 PROCEDURE — 85025 COMPLETE CBC W/AUTO DIFF WBC: CPT

## 2020-10-04 PROCEDURE — 83605 ASSAY OF LACTIC ACID: CPT

## 2020-10-04 PROCEDURE — 6360000002 HC RX W HCPCS: Performed by: INTERNAL MEDICINE

## 2020-10-04 PROCEDURE — 94761 N-INVAS EAR/PLS OXIMETRY MLT: CPT

## 2020-10-04 PROCEDURE — 6370000000 HC RX 637 (ALT 250 FOR IP): Performed by: HOSPITALIST

## 2020-10-04 PROCEDURE — 99291 CRITICAL CARE FIRST HOUR: CPT | Performed by: INTERNAL MEDICINE

## 2020-10-04 PROCEDURE — 94660 CPAP INITIATION&MGMT: CPT

## 2020-10-04 PROCEDURE — 36415 COLL VENOUS BLD VENIPUNCTURE: CPT

## 2020-10-04 PROCEDURE — 80053 COMPREHEN METABOLIC PANEL: CPT

## 2020-10-04 PROCEDURE — 2700000000 HC OXYGEN THERAPY PER DAY

## 2020-10-04 PROCEDURE — 2580000003 HC RX 258: Performed by: INTERNAL MEDICINE

## 2020-10-04 PROCEDURE — 2580000003 HC RX 258: Performed by: HOSPITALIST

## 2020-10-04 RX ORDER — MULTIVITAMIN WITH IRON
1 TABLET ORAL DAILY
Status: DISCONTINUED | OUTPATIENT
Start: 2020-10-04 | End: 2020-10-12 | Stop reason: ALTCHOICE

## 2020-10-04 RX ADMIN — ASPIRIN 81 MG: 81 TABLET, CHEWABLE ORAL at 09:08

## 2020-10-04 RX ADMIN — INSULIN GLARGINE 25 UNITS: 100 INJECTION, SOLUTION SUBCUTANEOUS at 22:23

## 2020-10-04 RX ADMIN — Medication 2000 UNITS: at 09:14

## 2020-10-04 RX ADMIN — SODIUM CHLORIDE, PRESERVATIVE FREE 10 ML: 5 INJECTION INTRAVENOUS at 22:29

## 2020-10-04 RX ADMIN — INSULIN LISPRO 1 UNITS: 100 INJECTION, SOLUTION INTRAVENOUS; SUBCUTANEOUS at 12:09

## 2020-10-04 RX ADMIN — SENNOSIDES 8.6 MG: 8.6 TABLET, FILM COATED ORAL at 22:28

## 2020-10-04 RX ADMIN — INSULIN LISPRO 2 UNITS: 100 INJECTION, SOLUTION INTRAVENOUS; SUBCUTANEOUS at 17:59

## 2020-10-04 RX ADMIN — VANCOMYCIN HYDROCHLORIDE 1750 MG: 5 INJECTION, POWDER, LYOPHILIZED, FOR SOLUTION INTRAVENOUS at 13:17

## 2020-10-04 RX ADMIN — THERA TABS 1 TABLET: TAB at 11:52

## 2020-10-04 RX ADMIN — Medication 20 MCG/MIN: at 22:32

## 2020-10-04 RX ADMIN — SENNOSIDES 8.6 MG: 8.6 TABLET, FILM COATED ORAL at 09:14

## 2020-10-04 RX ADMIN — SODIUM CHLORIDE, PRESERVATIVE FREE 10 ML: 5 INJECTION INTRAVENOUS at 09:22

## 2020-10-04 RX ADMIN — PANTOPRAZOLE SODIUM 40 MG: 40 TABLET, DELAYED RELEASE ORAL at 06:26

## 2020-10-04 RX ADMIN — CITALOPRAM 30 MG: 20 TABLET, FILM COATED ORAL at 09:15

## 2020-10-04 RX ADMIN — ENOXAPARIN SODIUM 130 MG: 150 INJECTION SUBCUTANEOUS at 09:09

## 2020-10-04 RX ADMIN — Medication 5 MCG/MIN: at 06:55

## 2020-10-04 RX ADMIN — FUROSEMIDE 40 MG: 40 TABLET ORAL at 09:14

## 2020-10-04 RX ADMIN — ATORVASTATIN CALCIUM 40 MG: 40 TABLET, FILM COATED ORAL at 22:28

## 2020-10-04 RX ADMIN — ENOXAPARIN SODIUM 130 MG: 150 INJECTION SUBCUTANEOUS at 22:28

## 2020-10-04 RX ADMIN — GLYCOPYRROLATE AND FORMOTEROL FUMARATE 2 PUFF: 9; 4.8 AEROSOL, METERED RESPIRATORY (INHALATION) at 08:59

## 2020-10-04 RX ADMIN — VANCOMYCIN HYDROCHLORIDE 1750 MG: 5 INJECTION, POWDER, LYOPHILIZED, FOR SOLUTION INTRAVENOUS at 00:45

## 2020-10-04 RX ADMIN — ALLOPURINOL 200 MG: 100 TABLET ORAL at 09:16

## 2020-10-04 RX ADMIN — FOLIC ACID 1 MG: 1 TABLET ORAL at 09:15

## 2020-10-04 RX ADMIN — Medication 4 MCG/MIN: at 04:00

## 2020-10-04 RX ADMIN — GABAPENTIN 800 MG: 400 CAPSULE ORAL at 09:14

## 2020-10-04 RX ADMIN — ARIPIPRAZOLE 15 MG: 15 TABLET ORAL at 09:08

## 2020-10-04 RX ADMIN — INSULIN LISPRO 1 UNITS: 100 INJECTION, SOLUTION INTRAVENOUS; SUBCUTANEOUS at 09:18

## 2020-10-04 RX ADMIN — GABAPENTIN 800 MG: 400 CAPSULE ORAL at 22:28

## 2020-10-04 RX ADMIN — ACETAMINOPHEN 650 MG: 325 TABLET ORAL at 09:21

## 2020-10-04 ASSESSMENT — PAIN SCALES - GENERAL
PAINLEVEL_OUTOF10: 0
PAINLEVEL_OUTOF10: 0
PAINLEVEL_OUTOF10: 6

## 2020-10-04 NOTE — PLAN OF CARE
Patient continues on inhaler; tolerating well.   Will continue to monitor patients respiratory status

## 2020-10-04 NOTE — PROGRESS NOTES
Intensive Care Unit  Intensivist Progress Note    Patient: Rush Narayan  : 1970  MRN#: [id number]  10/4/2020 12:08 PM  ADMISSION DAY:  2020  9:02 AM     Subjective:  Has low-grade fever today, still requiring low-dose Levophed. Patient was extubated on 10/2. BiPAP was weaned to nasal cannula . HPI: Patient is a 49-year-old morbidly obese black male lifetime non-smoker. Patient has a history of morbid obesity associated with type 2 diabetes mellitus, prior alcohol abuse, hyperlipidemia, hypertension, hypogonadism, nonalcoholic steatohepatitis, obstructive sleep apnea, and gout. Patient was hospitalized 2020 through 9/15/2020 with hypoxemic respiratory failure secondary to COVID-19 associated with diffuse bilateral infiltrates. At that time, patient received Decadron, remdesivir, and danazol. During hospitalization, he had issues with atrial fibrillation. His insulin therapy required adjustment. He was discharged home on subcutaneous Lovenox. Patient returned back to the emergency room on 2020 with increasing SOB and progressive hypoxia. CXR showed diffuse infiltrates. Deteriorated and required intubation. Patient underwent bronchoscopy on 2020 which demonstrated no mucus production. Patient Vitals for the past 8 hrs:   BP Temp Temp src Pulse Resp SpO2   10/04/20 0905 -- 100.7 °F (38.2 °C) Rectal 66 19 95 %   10/04/20 0852 -- -- -- -- -- 95 %   10/04/20 0702 (!) 99/54 -- -- 61 13 --   10/04/20 0658 (!) 95/56 -- -- 62 14 --   10/04/20 0630 (!) 86/48 -- -- 68 15 --   10/04/20 0600 (!) 100/58 -- -- 63 27 97 %   10/04/20 0500 100/63 -- -- 62 24 --   10/04/20 0430 (!) 98/55 -- -- 60 24 --       EXAM:  General:   Morbidly obese black male. HEENT:  normocephalic and atraumatic. No scleral icterus. PERR  Neck: supple. No Thyromegaly. Lungs: Crackles bilaterally. No retractions  Cardiac: RRR. No JVD. Abdomen: soft. Nontender. Large panniculus.   Extremities:  No clubbing, cyanosis x 4. Trace lower extremity edema bilaterally. Vasculature: capillary refill < 3 seconds. Palpable dorsalis pedis pulses. Skin:  warm and dry. Psych: Sedated on mechanical ventilator. Lymph:  No supraclavicular adenopathy. Neurologic:  No focal deficit. No seizures. Intake/Output Summary (Last 24 hours) at 10/4/2020 1208  Last data filed at 10/4/2020 0930  Gross per 24 hour   Intake 1640 ml   Output 2575 ml   Net -935 ml     I/O last 3 completed shifts: In: 3188 [P.O.:500; I.V.:1090]  Out: 7055 [Urine:2575]   Date 10/04/20 0000 - 10/04/20 2359   Shift 7096-7279 2956-2421 2683-0955 24 Hour Total   INTAKE   P.O.(mL/kg/hr) 0(0) 400  400   I. V.(mL/kg) 712(5.6)   712(5.6)   Shift Total(mL/kg) 712(5.6) 400(3.1)  1112(8.7)   OUTPUT   Urine(mL/kg/hr) 250(0.2)   250   Shift Total(mL/kg) 250(2)   250(2)   Weight (kg) 128.2 128.2 128.2 128. 2     Wt Readings from Last 3 Encounters:   10/03/20 282 lb 10.1 oz (128.2 kg)   09/15/20 281 lb 6.4 oz (127.6 kg)   08/21/20 (!) 306 lb 6.4 oz (139 kg)      Body mass index is 42.97 kg/m².          Scheduled Meds:   multivitamin  1 tablet Oral Daily    insulin glargine  25 Units Subcutaneous Nightly    pantoprazole  40 mg Oral QAM AC    vancomycin  1,750 mg Intravenous Q12H    insulin lispro  0-6 Units Subcutaneous TID WC    insulin lispro  0-3 Units Subcutaneous Nightly    lidocaine 1 % injection  5 mL Intradermal Once    vancomycin (VANCOCIN) intermittent dosing (placeholder)   Other RX Placeholder    magnesium replacement protocol   Other RX Placeholder    phosphorus replacement protocol   Other RX Placeholder    calcium replacement protocol   Other RX Placeholder    polyethylene glycol  17 g Oral Every Other Day    allopurinol  200 mg Oral Daily    amLODIPine  5 mg Oral Daily    ARIPiprazole  15 mg Oral Daily    aspirin  81 mg Oral Daily    atorvastatin  40 mg Oral Nightly    [Held by provider] busPIRone  10 mg Oral BID    citalopram  30 mg Oral Daily    Vitamin D  2,000 Units Oral Daily    folic acid  1 mg Oral Daily    furosemide  40 mg Oral Daily    gabapentin  800 mg Oral BID    [Held by provider] hydrALAZINE  50 mg Oral BID    enoxaparin  1 mg/kg Subcutaneous Q12H    [Held by provider] tamsulosin  0.4 mg Oral Nightly    sodium chloride flush  10 mL Intravenous 2 times per day    glycopyrrolate-formoterol  2 puff Inhalation BID    senna  1 tablet Oral BID     Continuous Infusions:   norepinephrine 4 mcg/min (10/04/20 1156)    dextrose       PRN Meds:potassium chloride, 20 mEq, PRN  sodium chloride flush, 10 mL, PRN  lidocaine, , PRN  sodium chloride flush, 10 mL, PRN  acetaminophen, 650 mg, Q6H PRN  polyethylene glycol, 17 g, Daily PRN  promethazine, 12.5 mg, Q6H PRN    Or  ondansetron, 4 mg, Q6H PRN  albuterol, 5 mg, Q4H PRN  glucose, 15 g, PRN  dextrose, 12.5 g, PRN  glucagon (rDNA), 1 mg, PRN  dextrose, 100 mL/hr, PRN        PARENTERAL VASOACTIVE / INOTROPIC AGENTS:    SEDATION/ANALGESIA:      ICU PROPHYLAXIS/THERAPY:   Stress ulcer: [] PPI Agent  [] H2RA  [] Sucralfate [] Other:   VTE: [] Enoxaparin     [] Warfarin  [] NOAC     [] PCD Device:Bilat LE           [] Heparin: [] Subcut / [] IV    NUTRITION SUPPORT:    SUPPORT DEVICES:    Oxygen Delivery - O2 Flow Rate (L/min): 1 L/min  VENT SETTINGS (Comprehensive)  Vent Information  $Ventilation: $Subsequent Day  Skin Assessment: Clean, dry, & intact  Suction Catheter Diameter: 14  Equipment ID: [id number]  Equipment Changed: Mask(attempted performa mask did not fit pt well )  Vent Type: C2G5 [place]  Vent Mode: (p-cmv)  Vt Ordered: 0 mL  Pressure Ordered: 22(pcontrol 16)  Rate Set: 12 bmp  Peak Flow: 46 L/min  Pressure Support: 10 cmH20  FiO2 : 30 %  SpO2: 95 %  SpO2/FiO2 ratio: 323.33  Sensitivity: 3  PEEP/CPAP: 6  I Time/ I Time %: 1 s  Humidification Source: Heated wire  Humidification Temp: 37  Humidification Temp Measured: 37  Circuit Condensation: Drained  Nitric Oxide/Epoprostenol In Use?: No  Mask Type: Full face mask  Mask Size: Large  Additional Respiratory  Assessments  Pulse: 66  Resp: 19  SpO2: 95 %  $End Tidal CO2: 41  pCO2 (TCOM, mmHg): 34 mmHg  Position: Semi-Clayton's  Humidification Source: Heated wire  Humidification Temp: 37  Circuit Condensation: Drained  Oral Care: Mouth swabbed, Lip moisturizer applied, Mouth moisturizer, Mouth suctioned  Subglottic Suction Done?: Yes      DATA:    CBC:   Recent Labs     10/02/20  0509 10/03/20  0348 10/04/20  0400   WBC 12.4* 9.6 13.2*   RBC 4.12* 4.08* 3.69*   HGB 11.8* 11.7* 10.6*   HCT 37.8* 37.9* 34.1*   MCV 91.7 92.9 92.4   MCH 28.6 28.7 28.7   MCHC 31.2* 30.9* 31.1*    359 315   MPV 11.5 11.0 11.8      BMP/CMP:   Recent Labs     10/02/20  0509 10/03/20  0348 10/04/20  0400    141 139   K 3.8 3.8 3.6    101 98   CO2 30 31 31   ANIONGAP 13.0 9.0 10.0   BUN 25* 19 14   CREATININE 0.8 0.6 0.8   GLUCOSE 101 161* 182*   CALCIUM 9.6 9.6 9.1   PROT 6.5 6.3 5.9*   LABALBU 3.4* 3.4* 3.3*   BILITOT 0.4 0.5 0.5   ALKPHOS 66 64 65   AST 21 26 19   ALT 53 48 37      Other Electrolytes:   No results for input(s): CAION, PHOS, MG in the last 72 hours. Serum Osmolality  No results for input(s): OSMOMEASER in the last 72 hours. Procalcitonin:  Recent Labs     10/02/20  0509 10/03/20  0348 10/04/20  0400   PROCAL 0.17* 0.13* 0.17*     Cardiac: No results for input(s): TROPONINT in the last 72 hours. Lipids: No results for input(s): CHOL, HDL in the last 72 hours. Invalid input(s): LDLCALCU  Coagulation: No results for input(s): INR in the last 72 hours.   Lactic Acid:   Recent Labs     10/02/20  0509 10/03/20  0348 10/04/20  0400   LACTA 1.8 1.0 0.8      ABGs:   Lab Results   Component Value Date    PH 7.34 09/23/2020    PCO2 56 09/23/2020    PO2 83 09/23/2020    HCO3 30 09/23/2020    O2SAT 95 09/23/2020     Lab Results   Component Value Date    IFIO2 5 09/23/2020    MODE BiLevel 08/05/2020    SETPEEP 8.0 08/05/2020 Radiology/Imaging:  XR CHEST PORTABLE [[id number]]  Collected: 10/03/20 0415        Order Status: Completed  Updated: 10/03/20 0516       Narrative:         Chest X-ray, 1 View     COMPARISON: Chest x-ray performed 09/30/2020. FINDINGS:   Right-sided PICC line in place with tip projecting over the cavoatrial   junction. Endotracheal tube and left central line have been removed. Slightly decreased diffuse patchy bilateral airspace disease. No pleural effusion. No pneumothorax. No cardiomegaly. No acute fracture.       Impression:         Slightly decreased diffuse patchy bilateral airspace disease. Support devices as above. This document has been electronically signed by: Jasmyne Méndez MD on   10/03/2020 05:15 AM       XR CHEST PORTABLE [[id number]]  Collected: 09/30/20 0550       Order Status: Completed  Updated: 09/30/20 0656       Narrative:         XR CHEST PORTABLE     Exam Date and Exam Time: 09/30/2020 04:39 AM     Accession: [id number]     Reason for exam: resp failure     Ordering Diagnosis: Acute respiratory failure with hypoxemia ([address] and   Acute respiratory failure with hypoxia ([address]       Comparison:  SR ROJAS  - XR CHEST PORTABLE  - 09/27/2020 01:20 PM EDT     Findings:   ET tube low lying, tip at the madi. Left central line tip over the medial aspect left innominate vein, stable. No significant change in coarse scattered bilateral infiltrates. Heart size normal.   No pneumothorax or effusion. No acute chest wall pathology.       Impression:         ET tube should be retracted 1.5-2.0 cm. No significant change in coarse scattered bilateral infiltrates. This document has been electronically signed by:  Nannette Lala MD on   09/30/2020 06:50 AM       XR CHEST PORTABLE [[id number]]  Resulted: 09/27/20 1423       Order Status: Completed  Updated: 09/27/20 1425       Narrative:         PROCEDURE: XR CHEST PORTABLE     CLINICAL INFORMATION: On ventilator TECHNIQUE: Mobile AP chest radiograph. COMPARISON: Mobile AP chest radiograph 9/24/2020     FINDINGS: Endotracheal tube, nasogastric tube and left-sided internal jugular central venous line are in stable position. Cardiomediastinal silhouette is within normal limits. Lung volumes remain low. Alveolar opacities in the bilateral lungs are slightly improved compared the prior study. Degenerative changes in the thoracic spine are poorly visualized.       Impression:           Slightly improving bilateral pneumonia. **This report has been created using voice recognition software. It may contain minor errors which are inherent in voice recognition technology. **     Final report electronically signed by Dr. Allen Gregg on 9/27/2020 2:23 PM       XR ABDOMEN FOR NG/OG/NE TUBE PLACEMENT [[id number]]  Resulted: 09/26/20 1022       Order Status: Completed  Updated: 09/26/20 1024       Narrative:         PROCEDURE: XR ABDOMEN FOR NG/OG/NE TUBE PLACEMENT     CLINICAL INFORMATION: 64-year-old male undergoing NG tube placement . COMPARISON: No prior study. TECHNIQUE: A portable AP supine view of the abdomen was obtained. FINDINGS:   There is an esophageal route tube in place. The tip is in the stomach.  No distended or dilated bowel loops are noted.  Diffuse infiltrates are seen at the lung bases.       Impression:         Esophageal route tube tip in the stomach. **This report has been created using voice recognition software. It may contain minor errors which are inherent in voice recognition technology. **     Final report electronically signed by Dr Lashawn Lyons on 9/26/2020 10:22 AM       XR CHEST 1 VIEW [[id number]]  Updated: 09/26/20 1005       Order Status: Canceled        XR CHEST PORTABLE [[id number]]  Resulted: 09/24/20 0738       Order Status: Completed  Updated: 09/24/20 0740       Narrative:         PROCEDURE: XR CHEST PORTABLE     CLINICAL INFORMATION: Left internal jugular central venous catheter placement     TECHNIQUE: Mobile AP chest radiograph. COMPARISON: Mobile AP chest radiograph 9/23/2020     FINDINGS: The tip of a new left-sided internal jugular central venous line overlies the superior vena cava. Endotracheal tube, nasogastric tube and esophageal probe are in stable position. Cardiomediastinal silhouette is within normal limits. Lung volumes are low. Alveolar opacities throughout the bilateral lungs are more extensive than the prior study. Degenerative changes in the thoracic spine are poorly visualized.       Impression:         1. Lines and tubes as above. 2. Worsening bilateral pneumonia. **This report has been created using voice recognition software. It may contain minor errors which are inherent in voice recognition technology. **     Final report electronically signed by Dr. Lucila Lino on 9/24/2020 7:38 AM       XR CHEST PORTABLE [[id number]]  Collected: 09/23/20 2335       Order Status: Completed  Updated: 09/24/20 0036       Narrative:         XR CHEST PORTABLE     Exam Date and Exam Time: 09/23/2020 11:42 PM     Accession: [id number]     Reason for exam: ett/ng     Ordering Diagnosis: Acute respiratory failure with hypoxemia ([address] and   Acute respiratory failure with hypoxia ([address]     1 view chest x-ray     Comparison:  DX  - CHEST PA /T/ LAT  - 11/09/2011 11:43 PM EST     Findings:   Endotracheal tube terminates 3.1 cm above the madi. Orogastric tube seen in the region of the stomach. Patchy opacities in the right upper lobe and lingula. No pleural effusion or pneumothorax. Heart size is normal.   No pulmonary vascular congestion. No acute fracture.       Impression:         1.  Endotracheal tube terminates 3.1 cm above the madi. 2.  Orogastric tube in the stomach. 3.  Patchy opacities in the right upper lobe and lingula.      This document has been electronically signed by: Juan Travis MD on   09/24/2020 12:35 AM       XR CHEST PORTABLE [[id number]]  Resulted: 09/23/20 1047       Order Status: Completed  Updated: 09/23/20 1049       Narrative:         PROCEDURE: XR CHEST PORTABLE     CLINICAL INFORMATION: sob. COMPARISON: Chest x-ray dated 10 September 2020     TECHNIQUE: AP upright view of the chest.     FINDINGS:   There is extensive bilateral pulmonary opacification, worse than on previous study dated 10 September 2020. These findings may represent inflammatory process. There are no effusions. There is borderline cardiomegaly.         Impression:         1. Bilateral pulmonary opacification worse than on previous study dated 10 September 2020. This may represent worsening inflammatory process, possibly Covid 19 infection. Please correlate clinically   2. Borderline cardiomegaly. **This report has been created using voice recognition software. It may contain minor errors which are inherent in voice recognition technology. **     Final report electronically signed by DR Tye Parisi on 9/23/2020 10:47 AM             Patient Active Problem List   Diagnosis    Chronic diastolic congestive heart failure (HCC)    Atrial fibrillation (HCC)    BPH (benign prostatic hyperplasia)    Carpal tunnel syndrome on right    Chronic gout    DM2 (diabetes mellitus, type 2) ([address]    Heart failure with preserved ejection fraction (HCC)    History of alcohol abuse    History of osteomyelitis    Hypertension, essential    Hypogonadism, male    Major depression    Morbid obesity ([address]    DURAN (nonalcoholic steatohepatitis)    KIARA (obstructive sleep apnea)    Vitamin D deficiency    Normocytic anemia    Physical deconditioning    Mood disorder (HCC)    Hallucinations    GERD (gastroesophageal reflux disease)    Wound of buttock    Chest wall pain with tenderness    Primary osteoarthritis of left hip    COVID-19    COVID-19 virus infection    Acute respiratory failure with hypoxia (HCC)       Assessment and Plan:    · Acute hypoxemic respiratory failure:  Patient was extubated on 10/2 to BiPAP. Alternating between BiPAP and nasal cannula. BiPAP as needed during the day and at night. Low-flow oxygen, wean down FiO2 and keep saturation between 92 to 95%. · COVID-19 pneumonia/infection: Convalescent plasma received 9/24/2020. · Sepsis: Secondary to pneumonia and COVID-19. Requiring small dose of norepinephrine. · Hypotension: Status post pressors and volume resuscitation. Requiring Levophed at 4 mcg/min, continue to wean Levophed. · MRSA bilateral pneumonia: Secondary to MRSA. Day #5/8 vancomycin. · Type 2 diabetes mellitus: Subcutaneous insulin. · Diastolic heart failure: On calcium channel blocker and diuretic. · Obstructive sleep apnea: Secondary morbid obesity. · Hyperlipidemia: On atorvastatin. · Hypertension: Amlodipine.   · Gout: On allopurinol  Ccm:33  Pippa Maya MD

## 2020-10-04 NOTE — FLOWSHEET NOTE
10/03/20 2155   Provider Notification   Reason for Communication Review case  (Need to modify Lantus 100u?)   Provider Name Orion Gant   Provider Notification [facility] Clinician (CNS, NP, CNM, CRNA, PA)   Method of Communication Secure Message   Response See orders  (modified lantus order)   Notification Time 2156     Blood sugar was 142 tonight, he's scheduled to get 100u of Lantus, is the 100u correct? His home list says he took 10u. He has Humalog sliding scale that he received 1u for tonight. He is eating majority of meals. Faye Richter modified Lantus order.

## 2020-10-05 LAB
ALBUMIN SERPL-MCNC: 2.6 G/DL (ref 3.5–5.1)
ALP BLD-CCNC: 77 U/L (ref 38–126)
ALT SERPL-CCNC: 30 U/L (ref 11–66)
ANION GAP SERPL CALCULATED.3IONS-SCNC: 14 MEQ/L (ref 8–16)
AST SERPL-CCNC: 26 U/L (ref 5–40)
BASOPHILS # BLD: 0.3 %
BASOPHILS ABSOLUTE: 0.1 THOU/MM3 (ref 0–0.1)
BILIRUB SERPL-MCNC: 0.8 MG/DL (ref 0.3–1.2)
BUN BLDV-MCNC: 15 MG/DL (ref 7–22)
CALCIUM SERPL-MCNC: 9.7 MG/DL (ref 8.5–10.5)
CHLORIDE BLD-SCNC: 98 MEQ/L (ref 98–111)
CO2: 25 MEQ/L (ref 23–33)
CREAT SERPL-MCNC: 1 MG/DL (ref 0.4–1.2)
EOSINOPHIL # BLD: 0.2 %
EOSINOPHILS ABSOLUTE: 0.1 THOU/MM3 (ref 0–0.4)
ERYTHROCYTE [DISTWIDTH] IN BLOOD BY AUTOMATED COUNT: 16.3 % (ref 11.5–14.5)
ERYTHROCYTE [DISTWIDTH] IN BLOOD BY AUTOMATED COUNT: 55.8 FL (ref 35–45)
GFR SERPL CREATININE-BSD FRML MDRD: > 90 ML/MIN/1.73M2
GLUCOSE BLD-MCNC: 250 MG/DL (ref 70–108)
GLUCOSE BLD-MCNC: 251 MG/DL (ref 70–108)
GLUCOSE BLD-MCNC: 265 MG/DL (ref 70–108)
GLUCOSE BLD-MCNC: 277 MG/DL (ref 70–108)
GLUCOSE BLD-MCNC: 288 MG/DL (ref 70–108)
HCT VFR BLD CALC: 36.3 % (ref 42–52)
HEMOGLOBIN: 11.2 GM/DL (ref 14–18)
IMMATURE GRANS (ABS): 0.33 THOU/MM3 (ref 0–0.07)
IMMATURE GRANULOCYTES: 1.1 %
LACTIC ACID: 0.9 MMOL/L (ref 0.5–2.2)
LYMPHOCYTES # BLD: 7.1 %
LYMPHOCYTES ABSOLUTE: 2.1 THOU/MM3 (ref 1–4.8)
MCH RBC QN AUTO: 29.2 PG (ref 26–33)
MCHC RBC AUTO-ENTMCNC: 30.9 GM/DL (ref 32.2–35.5)
MCV RBC AUTO: 94.8 FL (ref 80–94)
MONOCYTES # BLD: 7.2 %
MONOCYTES ABSOLUTE: 2.1 THOU/MM3 (ref 0.4–1.3)
NUCLEATED RED BLOOD CELLS: 0 /100 WBC
PLATELET # BLD: 402 THOU/MM3 (ref 130–400)
PLATELET ESTIMATE: ADEQUATE
PMV BLD AUTO: 10.9 FL (ref 9.4–12.4)
POTASSIUM SERPL-SCNC: 4.2 MEQ/L (ref 3.5–5.2)
PROCALCITONIN: 0.59 NG/ML (ref 0.01–0.09)
RBC # BLD: 3.83 MILL/MM3 (ref 4.7–6.1)
SCAN OF BLOOD SMEAR: NORMAL
SEG NEUTROPHILS: 84.1 %
SEGMENTED NEUTROPHILS ABSOLUTE COUNT: 25.1 THOU/MM3 (ref 1.8–7.7)
SODIUM BLD-SCNC: 137 MEQ/L (ref 135–145)
TOTAL PROTEIN: 6.8 G/DL (ref 6.1–8)
WBC # BLD: 29.8 THOU/MM3 (ref 4.8–10.8)

## 2020-10-05 PROCEDURE — 6370000000 HC RX 637 (ALT 250 FOR IP): Performed by: INTERNAL MEDICINE

## 2020-10-05 PROCEDURE — 6370000000 HC RX 637 (ALT 250 FOR IP): Performed by: HOSPITALIST

## 2020-10-05 PROCEDURE — 82948 REAGENT STRIP/BLOOD GLUCOSE: CPT

## 2020-10-05 PROCEDURE — 99291 CRITICAL CARE FIRST HOUR: CPT | Performed by: INTERNAL MEDICINE

## 2020-10-05 PROCEDURE — 6370000000 HC RX 637 (ALT 250 FOR IP): Performed by: NURSE PRACTITIONER

## 2020-10-05 PROCEDURE — 36415 COLL VENOUS BLD VENIPUNCTURE: CPT

## 2020-10-05 PROCEDURE — 80053 COMPREHEN METABOLIC PANEL: CPT

## 2020-10-05 PROCEDURE — 2700000000 HC OXYGEN THERAPY PER DAY

## 2020-10-05 PROCEDURE — 2100000000 HC CCU R&B

## 2020-10-05 PROCEDURE — 6360000002 HC RX W HCPCS: Performed by: INTERNAL MEDICINE

## 2020-10-05 PROCEDURE — 6360000002 HC RX W HCPCS: Performed by: HOSPITALIST

## 2020-10-05 PROCEDURE — 94660 CPAP INITIATION&MGMT: CPT

## 2020-10-05 PROCEDURE — 83605 ASSAY OF LACTIC ACID: CPT

## 2020-10-05 PROCEDURE — 2500000003 HC RX 250 WO HCPCS: Performed by: INTERNAL MEDICINE

## 2020-10-05 PROCEDURE — 94761 N-INVAS EAR/PLS OXIMETRY MLT: CPT

## 2020-10-05 PROCEDURE — 84145 PROCALCITONIN (PCT): CPT

## 2020-10-05 PROCEDURE — 85025 COMPLETE CBC W/AUTO DIFF WBC: CPT

## 2020-10-05 PROCEDURE — 2580000003 HC RX 258: Performed by: INTERNAL MEDICINE

## 2020-10-05 PROCEDURE — 2580000003 HC RX 258: Performed by: HOSPITALIST

## 2020-10-05 PROCEDURE — 94640 AIRWAY INHALATION TREATMENT: CPT

## 2020-10-05 RX ORDER — LIDOCAINE HYDROCHLORIDE 10 MG/ML
5 INJECTION, SOLUTION EPIDURAL; INFILTRATION; INTRACAUDAL; PERINEURAL ONCE
Status: DISCONTINUED | OUTPATIENT
Start: 2020-10-05 | End: 2020-11-06 | Stop reason: HOSPADM

## 2020-10-05 RX ORDER — SODIUM CHLORIDE 0.9 % (FLUSH) 0.9 %
10 SYRINGE (ML) INJECTION PRN
Status: DISCONTINUED | OUTPATIENT
Start: 2020-10-05 | End: 2020-10-12 | Stop reason: SDUPTHER

## 2020-10-05 RX ORDER — CITALOPRAM 20 MG/1
20 TABLET ORAL DAILY
Status: DISCONTINUED | OUTPATIENT
Start: 2020-10-05 | End: 2020-10-07

## 2020-10-05 RX ORDER — INSULIN GLARGINE 100 [IU]/ML
30 INJECTION, SOLUTION SUBCUTANEOUS NIGHTLY
Status: DISCONTINUED | OUTPATIENT
Start: 2020-10-05 | End: 2020-10-08

## 2020-10-05 RX ORDER — SODIUM CHLORIDE 0.9 % (FLUSH) 0.9 %
10 SYRINGE (ML) INJECTION EVERY 12 HOURS SCHEDULED
Status: DISCONTINUED | OUTPATIENT
Start: 2020-10-05 | End: 2020-10-23 | Stop reason: SDUPTHER

## 2020-10-05 RX ORDER — MIDODRINE HYDROCHLORIDE 5 MG/1
5 TABLET ORAL
Status: DISCONTINUED | OUTPATIENT
Start: 2020-10-05 | End: 2020-10-17

## 2020-10-05 RX ADMIN — ACETAMINOPHEN 650 MG: 325 TABLET ORAL at 09:54

## 2020-10-05 RX ADMIN — MIDODRINE HYDROCHLORIDE 5 MG: 5 TABLET ORAL at 09:46

## 2020-10-05 RX ADMIN — GLYCOPYRROLATE AND FORMOTEROL FUMARATE 2 PUFF: 9; 4.8 AEROSOL, METERED RESPIRATORY (INHALATION) at 21:22

## 2020-10-05 RX ADMIN — SODIUM CHLORIDE, PRESERVATIVE FREE 10 ML: 5 INJECTION INTRAVENOUS at 09:45

## 2020-10-05 RX ADMIN — ARIPIPRAZOLE 15 MG: 15 TABLET ORAL at 09:44

## 2020-10-05 RX ADMIN — INSULIN LISPRO 3 UNITS: 100 INJECTION, SOLUTION INTRAVENOUS; SUBCUTANEOUS at 09:47

## 2020-10-05 RX ADMIN — PANTOPRAZOLE SODIUM 40 MG: 40 TABLET, DELAYED RELEASE ORAL at 06:08

## 2020-10-05 RX ADMIN — ATORVASTATIN CALCIUM 40 MG: 40 TABLET, FILM COATED ORAL at 21:22

## 2020-10-05 RX ADMIN — VANCOMYCIN HYDROCHLORIDE 1750 MG: 5 INJECTION, POWDER, LYOPHILIZED, FOR SOLUTION INTRAVENOUS at 00:00

## 2020-10-05 RX ADMIN — ACETAMINOPHEN 650 MG: 325 TABLET ORAL at 15:53

## 2020-10-05 RX ADMIN — INSULIN GLARGINE 30 UNITS: 100 INJECTION, SOLUTION SUBCUTANEOUS at 20:58

## 2020-10-05 RX ADMIN — GABAPENTIN 800 MG: 400 CAPSULE ORAL at 09:40

## 2020-10-05 RX ADMIN — GABAPENTIN 800 MG: 400 CAPSULE ORAL at 21:22

## 2020-10-05 RX ADMIN — ALLOPURINOL 200 MG: 100 TABLET ORAL at 09:44

## 2020-10-05 RX ADMIN — MIDODRINE HYDROCHLORIDE 5 MG: 5 TABLET ORAL at 11:56

## 2020-10-05 RX ADMIN — FOLIC ACID 1 MG: 1 TABLET ORAL at 09:44

## 2020-10-05 RX ADMIN — ENOXAPARIN SODIUM 130 MG: 150 INJECTION SUBCUTANEOUS at 21:22

## 2020-10-05 RX ADMIN — GLYCOPYRROLATE AND FORMOTEROL FUMARATE 2 PUFF: 9; 4.8 AEROSOL, METERED RESPIRATORY (INHALATION) at 08:10

## 2020-10-05 RX ADMIN — VANCOMYCIN HYDROCHLORIDE 1750 MG: 5 INJECTION, POWDER, LYOPHILIZED, FOR SOLUTION INTRAVENOUS at 13:15

## 2020-10-05 RX ADMIN — FUROSEMIDE 40 MG: 40 TABLET ORAL at 09:40

## 2020-10-05 RX ADMIN — CITALOPRAM 20 MG: 20 TABLET, FILM COATED ORAL at 09:44

## 2020-10-05 RX ADMIN — ASPIRIN 81 MG: 81 TABLET, CHEWABLE ORAL at 09:40

## 2020-10-05 RX ADMIN — Medication 2000 UNITS: at 09:43

## 2020-10-05 RX ADMIN — Medication 12 MCG/MIN: at 17:20

## 2020-10-05 RX ADMIN — SENNOSIDES 8.6 MG: 8.6 TABLET, FILM COATED ORAL at 21:22

## 2020-10-05 RX ADMIN — ENOXAPARIN SODIUM 130 MG: 150 INJECTION SUBCUTANEOUS at 09:43

## 2020-10-05 RX ADMIN — INSULIN LISPRO 3 UNITS: 100 INJECTION, SOLUTION INTRAVENOUS; SUBCUTANEOUS at 13:02

## 2020-10-05 RX ADMIN — SENNOSIDES 8.6 MG: 8.6 TABLET, FILM COATED ORAL at 09:44

## 2020-10-05 RX ADMIN — MIDODRINE HYDROCHLORIDE 5 MG: 5 TABLET ORAL at 17:20

## 2020-10-05 RX ADMIN — INSULIN LISPRO 3 UNITS: 100 INJECTION, SOLUTION INTRAVENOUS; SUBCUTANEOUS at 17:21

## 2020-10-05 RX ADMIN — THERA TABS 1 TABLET: TAB at 09:44

## 2020-10-05 RX ADMIN — ACETAMINOPHEN 650 MG: 325 TABLET ORAL at 00:12

## 2020-10-05 ASSESSMENT — PAIN SCALES - GENERAL
PAINLEVEL_OUTOF10: 0

## 2020-10-05 ASSESSMENT — PAIN SCALES - WONG BAKER: WONGBAKER_NUMERICALRESPONSE: 0

## 2020-10-05 NOTE — PROGRESS NOTES
Comprehensive Nutrition Assessment    Type and Reason for Visit:  Reassess(follow-up)    Nutrition Recommendations/Plan:   Continue current diet  ONS started (ensure high protein TID)  Continue vitamin D per MD order  Bowel meds per MD order  Monitor for BM (last recorded was 9/23)    Nutrition Assessment:  Pt improving from a nutritional standpoint AEB patient extubated and on oral diet. Remains at risk for further nutritional compromise r/t admitted with acute respiratory failure with hypoxemia, obesity, elevated Hgb A1C of 9.9,  and underlying medical condition (hx: CAD, DM, GERD, Alcohol abuse, HLD, HTN, Vitamin D deficiency).  Nutrition recommendations/interventions as above    Malnutrition Assessment:  Malnutrition Status:  Insufficient data(+covid)    Context:  Acute Illness     Findings of the 6 clinical characteristics of malnutrition:  Energy Intake:  No significant decrease in energy intake(great appetite noted 9/14/20. NPO x 1 day)  Weight Loss:  (-14# in 7 weeks, note current weight is stated so not reliable)     Body Fat Loss:  Unable to assess(+covid patient)     Muscle Mass Loss:  Unable to assess(+covid)    Fluid Accumulation:  1 - Mild Extremities   Strength:  Not Performed    Estimated Daily Nutrient Needs:  Energy (kcal):  2100 (30/kgm IBW in late phase); Weight Used for Energy Requirements:  Current(132kgm 9/30 bedscale)     Protein (g):  ~175 (2.5 grams/kg ideal weight); Weight Used for Protein Requirements:  Ideal(70 kg)        Fluid (ml/day):  per MD; Weight Used for Fluid Requirements:  (n/a)      Nutrition Related Findings:  +covid. Intubated 9/23, extubated 10/2. Carb controlled diet started 10/3 with intake noted to be 1-25% for one meal and then % for another meal. POC: 224 with lantus and humalog coverage.  Spoke to RN this morning and reports that patient was working on trying to eat breakfast.      Wounds:  Stage III, Pressure Injury(hip - per wound care RN healing as of

## 2020-10-05 NOTE — PLAN OF CARE
Problem: Falls - Risk of:  Goal: Will remain free from falls  Description: Will remain free from falls  10/5/2020 1410 by Maximino Joe RN  Outcome: Ongoing  Note: No falls this shift, call light in reach. Bed alarm on. PT/OT consulted     Problem: Skin Integrity:  Goal: Absence of new skin breakdown  Description: Absence of new skin breakdown  10/5/2020 1410 by Maximino Joe RN  Outcome: Ongoing  Note: Pt turned and repositioned every 2 hours and as needed. Dimas care provided. Problem: Discharge Planning:  Goal: Discharged to appropriate level of care  Description: Discharged to appropriate level of care  10/5/2020 1410 by Maximino Joe RN  Outcome: Ongoing  Note: Pt is from home with home health     Problem: Urinary Retention:  Goal: Urinary elimination within specified parameters  Description: Urinary elimination within specified parameters  10/5/2020 1410 by Maximino Joe RN  Outcome: Ongoing  Note: Dimas in place     Problem: Nutrition  Goal: Optimal nutrition therapy  10/5/2020 1410 by Maximino Joe RN  Outcome: Ongoing  Note: Tolerating diet     Problem: Serum Glucose Level - Abnormal:  Goal: Ability to maintain appropriate glucose levels will improve  Description: Ability to maintain appropriate glucose levels will improve  10/5/2020 1410 by Maximino Joe RN  Outcome: Ongoing  Note: Monitoring blood sugar level before meals and at bedtime. Care plan reviewed with patient. Patient verbalize understanding of the plan of care and contribute to goal setting.

## 2020-10-05 NOTE — PLAN OF CARE
Continue the use of bipap while pt sleeps and during distress. Pt resting on 1 lpm NC at this time.   Problem: Impaired respiratory status  Goal: Patient will achieve/maintain normal respiratory rate/effort  10/5/2020 0615 by Evans Kim RCP  Outcome: Ongoing

## 2020-10-05 NOTE — PLAN OF CARE
[address]  OCCUPATIONAL THERAPY MISSED TREATMENT NOTE  [facility] 4B  [id number]/[id number]      Date: 10/5/2020  Patient Name: Don Colin        CSN: [id number]   : 1970  (44 y.o.)  Gender: male   Referring Practitioner: BHUPINDER Osorio  Diagnosis: Acute Respiratory Failure with Hypoxemia         REASON FOR MISSED TREATMENT: Hold Treatment per Nursing  During first attempt, pt was having blood drawn. Pt had a fever this afternoon and was packed in ice. Will retry tomorrow.

## 2020-10-05 NOTE — PROGRESS NOTES
Patient:  Rabia Severe                    Unit/Bed:4B-04/004-A  MRN: [id number]            PCP: Kalyani Irby MD  Date of Admission: 9/23/2020     Assessment and Plan(All pulmonary edema, renal failure, PE, and respiratory failure diagnoses are acute in nature unless otherwise specified):        1. Acute hypoxemic respiratory failure: Patient extubated 10/2/2020. On low-flow oxygen. .  2. Acute lung injury: Radiographically improving. 3. COVID-19: Convalescent plasma received 9/24/2020.  4. Sepsis: Secondary to pneumonia and COVID-19. Awaiting cortisol screen to differentiate septic shock from sepsis. Associated with tachypnea and no pneumonia. 5. Hypotension: Status post pressors and volume resuscitation. Screen for adrenal insufficiency. Continues with moderate level of support. Add midodrine p.o.  6. Pneumonia: Secondary to MRSA. Day #7/8 vancomycin. Radiographic improvement. Repeat chest x-ray in the morning. 7. Type 2 diabetes mellitus: Subcutaneous insulin. 8. Diastolic heart failure: On calcium channel blocker and diuretic. Amlodipine on hold secondary to hypotension. Still receiving diuretic.  9. Obstructive sleep apnea: Secondary morbid obesity. CPAP/BiPAP noncompliant. 10. Hyperlipidemia: On atorvastatin. 11. Hypertension:  Currently hypotensive. Amlodipine discontinued. 12. Gout: On allopurinol.     CC: Respiratory failure  HPI: Patient is a 76-year-old morbidly obese black male lifetime non-smoker. Patient has a history of morbid obesity associated with type 2 diabetes mellitus, prior alcohol abuse, hyperlipidemia, hypertension, hypogonadism, nonalcoholic steatohepatitis, obstructive sleep apnea, and gout. Patient was hospitalized 9/6/2020 through 9/15/2020 with hypoxemic respiratory failure secondary to COVID-19 associated with diffuse bilateral infiltrates. At that time, patient received Decadron, remdesivir, and danazol.   During hospitalization, he had issues with atrial fibrillation. His insulin therapy required adjustment. He was discharged home on subcutaneous Lovenox. Patient returned back to the emergency room on 9/23/2020 with increasing SOB and progressive hypoxia. CXR showed diffuse infiltrates. Deteriorated and required intubation. Patient underwent bronchoscopy on 9/27/2020 which demonstrated no mucus production. Patient extubated 10/2/2020. ROS:  Patient has a cough with mucus production. States he does not like the BiPAP as it is too tight on his face and blows air into his eyes. Feels fatigued. Denies shortness of breath. .  PMH:  Per HPI  SHX: Lifetime non-smoker  FHX: Positive for heart disease.   Allergies: PCN  Medications:     norepinephrine 18 mcg/min (10/05/20 0929)    dextrose        citalopram  20 mg Oral Daily    insulin glargine  30 Units Subcutaneous Nightly    multivitamin  1 tablet Oral Daily    pantoprazole  40 mg Oral QAM AC    vancomycin  1,750 mg Intravenous Q12H    insulin lispro  0-6 Units Subcutaneous TID WC    insulin lispro  0-3 Units Subcutaneous Nightly    lidocaine 1 % injection  5 mL Intradermal Once    vancomycin (VANCOCIN) intermittent dosing (placeholder)   Other RX Placeholder    magnesium replacement protocol   Other RX Placeholder    phosphorus replacement protocol   Other RX Placeholder    calcium replacement protocol   Other RX Placeholder    polyethylene glycol  17 g Oral Every Other Day    allopurinol  200 mg Oral Daily    ARIPiprazole  15 mg Oral Daily    aspirin  81 mg Oral Daily    atorvastatin  40 mg Oral Nightly    Vitamin D  2,000 Units Oral Daily    folic acid  1 mg Oral Daily    furosemide  40 mg Oral Daily    gabapentin  800 mg Oral BID    enoxaparin  1 mg/kg Subcutaneous Q12H    [Held by provider] tamsulosin  0.4 mg Oral Nightly    sodium chloride flush  10 mL Intravenous 2 times per day    glycopyrrolate-formoterol  2 puff Inhalation BID    senna  1 tablet Oral BID     Vital Signs:   T: 99.3: P: 64: RR: 24: B/P: 114/58: FiO2: 30: O2 Sat: 93: I/O: 1347/2350  GCS:  15: Body mass index is 42.97 kg/m². Isabel Quique General:   Morbidly obese black male. HEENT:  normocephalic and atraumatic. No scleral icterus. PERR  Neck: supple. No Thyromegaly. Lungs:  Mild upper airway retained secretions. Tachypnea. No retractions  Cardiac: RRR. No JVD. Abdomen: soft. Nontender. Large panniculus. Extremities:  No clubbing, cyanosis x 4. Trace lower extremity edema bilaterally. Vasculature: capillary refill < 3 seconds. Palpable dorsalis pedis pulses. Skin:  warm and dry. Psych:  Awake. Withdrawn. Oriented to person place and circumstance. Lymph:  No supraclavicular adenopathy. Neurologic:  No focal deficit. No seizures.     Data: (All radiographs, tracings, PFTs, and imaging are personally viewed and interpreted unless otherwise noted). · Telemetry shows sinus rhythm. · Echocardiogram shows an ejection fraction of 60%. .  · Sputum collected 9/23/2020: Positive for MRSA. · Lactic acid 0.9. Glucose 224. · Chest x-ray shows ongoing improvement. Request for chest x-ray for tomorrow pending. CC time 30 minutes.   Electronically signed by Ambrose Crowley.  Federico Bhatti M.D.

## 2020-10-05 NOTE — FLOWSHEET NOTE
10/05/20 1953   Provider Notification   Reason for Communication Review case   Provider Name Yumiko Juárez   Provider Notification Advance Practice Clinician (CNS, NP, CNM, CRNA, PA)   Method of Communication Secure Message   Response Waiting for response   Notification Time 1953   Message sent at this time regarding pt WBC results.

## 2020-10-05 NOTE — PLAN OF CARE
Problem: Nutrition  Goal: Optimal nutrition therapy  10/5/2020 1309 by Lia Butcher RD, LD  Outcome: Ongoing   Nutrition Problem #1: Increased nutrient needs  Intervention: Food and/or Nutrient Delivery: Continue Current Diet, Vitamin Supplement, Start Oral Nutrition Supplement  Nutritional Goals: Patient will consume 75% or greater of meals and ONS during LOS

## 2020-10-05 NOTE — CARE COORDINATION
10/5/20, 3:08 PM EDT    DISCHARGE ON GOING [address] day: 12  Location: -06/006-A Reason for admit: Acute respiratory failure with hypoxemia ([facility] [address] [J96.01]  Acute respiratory failure with hypoxia ([facility] [address] [J96.01]   Procedure:   9/23 CXR: Bilateral pulmonary opacification worse than on previous study dated 10 September 2020. This may represent worsening inflammatory process, possibly Covid 19 infection; borderline cardiomegaly  9/23 Intubated  9/24 CVC left subclavian - 9/30 removed  4/09 PICC right basilic  64/4 Extubated to bipap  10/3 CXR:   Slightly decreased diffuse patchy bilateral airspace disease. Support devices as above     Treatment Plan of Care: Pt does not like bipap - too tight on face and blows air into his eyes. C/o fatigue. Sats 92% on 0.5 L O2. Tmax 101.9. NSR. Ox4. Follows commands. PT/OT.  Cardiac monitoring, I&O, daily weight, mason care. Levo @ 14 mcg/min, Allopurinol, abilify, asa, lipitor, celexa, lovenox bid, folic acid, po lasix daily, neurontin, inhaler, lantus, SSI ACHS, midodrine, protonix, vit D, IV vancomycin, Electrolyte replacement protocols.   Barriers to Discharge: not medically ready  PCP: Robert Lane MD  Readmission Risk Score: 40%  Patient Goals/Plan/Treatment Preferences: From home alone and current [facility] for RN/PT/OT. SW on case. PT/OT to eval; possible TCU.

## 2020-10-06 ENCOUNTER — APPOINTMENT (OUTPATIENT)
Dept: GENERAL RADIOLOGY | Age: 50
DRG: 870 | End: 2020-10-06
Payer: MEDICARE

## 2020-10-06 ENCOUNTER — APPOINTMENT (OUTPATIENT)
Dept: ULTRASOUND IMAGING | Age: 50
DRG: 870 | End: 2020-10-06
Payer: MEDICARE

## 2020-10-06 LAB
ACINETOBACTER CALCOACETICUS-BAUMANNII BY PCR: NOT DETECTED
ADENOVIRUS BY PCR: NOT DETECTED
ALBUMIN SERPL-MCNC: 3.1 G/DL (ref 3.5–5.1)
ALP BLD-CCNC: 77 U/L (ref 38–126)
ALT SERPL-CCNC: 28 U/L (ref 11–66)
AMORPHOUS: ABNORMAL
ANION GAP SERPL CALCULATED.3IONS-SCNC: 15 MEQ/L (ref 8–16)
AST SERPL-CCNC: 29 U/L (ref 5–40)
BACTERIA: ABNORMAL
BASOPHILS # BLD: 0.3 %
BASOPHILS ABSOLUTE: 0.1 THOU/MM3 (ref 0–0.1)
BILIRUB SERPL-MCNC: 1.1 MG/DL (ref 0.3–1.2)
BILIRUBIN URINE: ABNORMAL
BLOOD, URINE: ABNORMAL
BUN BLDV-MCNC: 28 MG/DL (ref 7–22)
CALCIUM SERPL-MCNC: 9.6 MG/DL (ref 8.5–10.5)
CASTS: ABNORMAL /LPF
CASTS: ABNORMAL /LPF
CHARACTER, URINE: ABNORMAL
CHLAMYDIA PNEUMONIAE BY PCR: NOT DETECTED
CHLORIDE BLD-SCNC: 94 MEQ/L (ref 98–111)
CO2: 26 MEQ/L (ref 23–33)
COLOR: ABNORMAL
CORONAVIRUS PCR: NOT DETECTED
CORTISOL COLLECTION INFO: NORMAL
CORTISOL: 16.36 UG/DL
CREAT SERPL-MCNC: 3.3 MG/DL (ref 0.4–1.2)
CREATININE URINE: 300.1 MG/DL
CRYSTALS: ABNORMAL
EKG ATRIAL RATE: 70 BPM
EKG P AXIS: 37 DEGREES
EKG P-R INTERVAL: 136 MS
EKG Q-T INTERVAL: 470 MS
EKG QRS DURATION: 80 MS
EKG QTC CALCULATION (BAZETT): 507 MS
EKG R AXIS: -12 DEGREES
EKG T AXIS: -147 DEGREES
EKG VENTRICULAR RATE: 70 BPM
ENTEROBACTER CLOACAE COMPLEX BY PCR: NOT DETECTED
EOSINOPHIL # BLD: 0.1 %
EOSINOPHILS ABSOLUTE: 0 THOU/MM3 (ref 0–0.4)
EPITHELIAL CELLS, UA: ABNORMAL /HPF
ERYTHROCYTE [DISTWIDTH] IN BLOOD BY AUTOMATED COUNT: 16.6 % (ref 11.5–14.5)
ERYTHROCYTE [DISTWIDTH] IN BLOOD BY AUTOMATED COUNT: 56.6 FL (ref 35–45)
ESCHERICHIA COLI BY PCR: NOT DETECTED
GFR SERPL CREATININE-BSD FRML MDRD: 24 ML/MIN/1.73M2
GLUCOSE BLD-MCNC: 247 MG/DL (ref 70–108)
GLUCOSE BLD-MCNC: 268 MG/DL (ref 70–108)
GLUCOSE BLD-MCNC: 315 MG/DL (ref 70–108)
GLUCOSE BLD-MCNC: 340 MG/DL (ref 70–108)
GLUCOSE BLD-MCNC: 341 MG/DL (ref 70–108)
GLUCOSE, URINE: NEGATIVE MG/DL
HAEMOPHILUS INFLUENZAE BY PCR: NOT DETECTED
HCT VFR BLD CALC: 31.5 % (ref 42–52)
HEMOGLOBIN: 9.7 GM/DL (ref 14–18)
ICTOTEST: NEGATIVE
IMMATURE GRANS (ABS): 0.54 THOU/MM3 (ref 0–0.07)
IMMATURE GRANULOCYTES: 1.4 %
INFLUENZA A BY PCR: NOT DETECTED
INFLUENZA B BY PCR: NOT DETECTED
KETONES, URINE: ABNORMAL
KLEBSIELLA AEROGENES BY PCR: NOT DETECTED
KLEBSIELLA OXYTOCA BY PCR: NOT DETECTED
KLEBSIELLA PNEUMONIAE GROUP BY PCR: NOT DETECTED
LACTIC ACID: 1.1 MMOL/L (ref 0.5–2.2)
LEGIONELLA PNEUMOPHILIA BY PCR: NOT DETECTED
LEUKOCYTE EST, POC: ABNORMAL
LYMPHOCYTES # BLD: 7.4 %
LYMPHOCYTES ABSOLUTE: 2.8 THOU/MM3 (ref 1–4.8)
MCH RBC QN AUTO: 28.4 PG (ref 26–33)
MCHC RBC AUTO-ENTMCNC: 30.8 GM/DL (ref 32.2–35.5)
MCV RBC AUTO: 92.4 FL (ref 80–94)
METAPNEUMOVIRUS BY PCR: NOT DETECTED
MONOCYTES # BLD: 7.5 %
MONOCYTES ABSOLUTE: 2.8 THOU/MM3 (ref 0.4–1.3)
MORAXELLA CATARRHALIS BY PCR: NOT DETECTED
MYCOPLASMA PNEUMONIAE BY PCR: NOT DETECTED
MYOGLOBIN URINE: POSITIVE
NITRITE, URINE: NEGATIVE
NUCLEATED RED BLOOD CELLS: 0 /100 WBC
PARAINFLUENZA VIRUS BY PCR: NOT DETECTED
PH UA: 5 (ref 5–9)
PLATELET # BLD: 407 THOU/MM3 (ref 130–400)
PMV BLD AUTO: 11.3 FL (ref 9.4–12.4)
POTASSIUM SERPL-SCNC: 3.4 MEQ/L (ref 3.5–5.2)
PROCALCITONIN: 15.22 NG/ML (ref 0.01–0.09)
PROTEIN UA: 100 MG/DL
PROTEUS SPECIES BY PCR: NOT DETECTED
PSEUDOMONAS AERUGINOSA BY PCR: NOT DETECTED
RBC # BLD: 3.41 MILL/MM3 (ref 4.7–6.1)
RBC URINE: ABNORMAL /HPF
RESISTANT GENE CTX-M BY PCR: ABNORMAL
RESISTANT GENE IMP BY PCR: ABNORMAL
RESISTANT GENE KPC BY PCR: ABNORMAL
RESISTANT GENE MECA/C & MREJ BY PCR: DETECTED
RESISTANT GENE NDM BY PCR: ABNORMAL
RESISTANT GENE OXA-48-LIKE BY PCR: ABNORMAL
RESISTANT GENE VIM BY PCR: ABNORMAL
RESPIRATORY SYNCYTIAL VIRUS BY PCR: NOT DETECTED
RHINOVIRUS ENTEROVIRUS PCR: NOT DETECTED
SEG NEUTROPHILS: 83.3 %
SEGMENTED NEUTROPHILS ABSOLUTE COUNT: 31.2 THOU/MM3 (ref 1.8–7.7)
SERRATIA MARCESCENS BY PCR: NOT DETECTED
SODIUM BLD-SCNC: 135 MEQ/L (ref 135–145)
SODIUM URINE: 43 MEQ/L
SOURCE: ABNORMAL
SPECIFIC GRAVITY UA: >= 1.03 (ref 1–1.03)
SPECIMEN ACCEPTABILITY: ABNORMAL
STAPH AUREUS BY PCR: DETECTED
STREP AGALACTIAE BY PCR: NOT DETECTED
STREP PNEUMONIAE BY PCR: NOT DETECTED
STREP PYOGENES BY PCR: NOT DETECTED
TOTAL CK: 725 U/L (ref 55–170)
TOTAL PROTEIN: 6.8 G/DL (ref 6.1–8)
UROBILINOGEN, URINE: 1 EU/DL (ref 0–1)
WBC # BLD: 37.4 THOU/MM3 (ref 4.8–10.8)
WBC UA: > 200 /HPF

## 2020-10-06 PROCEDURE — 94002 VENT MGMT INPAT INIT DAY: CPT

## 2020-10-06 PROCEDURE — 87077 CULTURE AEROBIC IDENTIFY: CPT

## 2020-10-06 PROCEDURE — 83516 IMMUNOASSAY NONANTIBODY: CPT

## 2020-10-06 PROCEDURE — 82948 REAGENT STRIP/BLOOD GLUCOSE: CPT

## 2020-10-06 PROCEDURE — 87086 URINE CULTURE/COLONY COUNT: CPT

## 2020-10-06 PROCEDURE — 83605 ASSAY OF LACTIC ACID: CPT

## 2020-10-06 PROCEDURE — 93005 ELECTROCARDIOGRAM TRACING: CPT | Performed by: INTERNAL MEDICINE

## 2020-10-06 PROCEDURE — 71045 X-RAY EXAM CHEST 1 VIEW: CPT

## 2020-10-06 PROCEDURE — 84145 PROCALCITONIN (PCT): CPT

## 2020-10-06 PROCEDURE — 87205 SMEAR GRAM STAIN: CPT

## 2020-10-06 PROCEDURE — 80202 ASSAY OF VANCOMYCIN: CPT

## 2020-10-06 PROCEDURE — 2580000003 HC RX 258: Performed by: INTERNAL MEDICINE

## 2020-10-06 PROCEDURE — 6370000000 HC RX 637 (ALT 250 FOR IP): Performed by: INTERNAL MEDICINE

## 2020-10-06 PROCEDURE — 31500 INSERT EMERGENCY AIRWAY: CPT

## 2020-10-06 PROCEDURE — 89220 SPUTUM SPECIMEN COLLECTION: CPT

## 2020-10-06 PROCEDURE — 76770 US EXAM ABDO BACK WALL COMP: CPT

## 2020-10-06 PROCEDURE — 31500 INSERT EMERGENCY AIRWAY: CPT | Performed by: INTERNAL MEDICINE

## 2020-10-06 PROCEDURE — 2580000003 HC RX 258: Performed by: NURSE PRACTITIONER

## 2020-10-06 PROCEDURE — 0BH18EZ INSERTION OF ENDOTRACHEAL AIRWAY INTO TRACHEA, VIA NATURAL OR ARTIFICIAL OPENING ENDOSCOPIC: ICD-10-PCS | Performed by: INTERNAL MEDICINE

## 2020-10-06 PROCEDURE — 86038 ANTINUCLEAR ANTIBODIES: CPT

## 2020-10-06 PROCEDURE — 86147 CARDIOLIPIN ANTIBODY EA IG: CPT

## 2020-10-06 PROCEDURE — 82550 ASSAY OF CK (CPK): CPT

## 2020-10-06 PROCEDURE — 2100000000 HC CCU R&B

## 2020-10-06 PROCEDURE — 87106 FUNGI IDENTIFICATION YEAST: CPT

## 2020-10-06 PROCEDURE — 6360000002 HC RX W HCPCS: Performed by: NURSE PRACTITIONER

## 2020-10-06 PROCEDURE — 6360000002 HC RX W HCPCS

## 2020-10-06 PROCEDURE — 36415 COLL VENOUS BLD VENIPUNCTURE: CPT

## 2020-10-06 PROCEDURE — 93010 ELECTROCARDIOGRAM REPORT: CPT | Performed by: INTERNAL MEDICINE

## 2020-10-06 PROCEDURE — 80053 COMPREHEN METABOLIC PANEL: CPT

## 2020-10-06 PROCEDURE — 87798 DETECT AGENT NOS DNA AMP: CPT

## 2020-10-06 PROCEDURE — 2700000000 HC OXYGEN THERAPY PER DAY

## 2020-10-06 PROCEDURE — 6360000002 HC RX W HCPCS: Performed by: INTERNAL MEDICINE

## 2020-10-06 PROCEDURE — 86255 FLUORESCENT ANTIBODY SCREEN: CPT

## 2020-10-06 PROCEDURE — 94761 N-INVAS EAR/PLS OXIMETRY MLT: CPT

## 2020-10-06 PROCEDURE — 85025 COMPLETE CBC W/AUTO DIFF WBC: CPT

## 2020-10-06 PROCEDURE — 87486 CHLMYD PNEUM DNA AMP PROBE: CPT

## 2020-10-06 PROCEDURE — 83874 ASSAY OF MYOGLOBIN: CPT

## 2020-10-06 PROCEDURE — 51702 INSERT TEMP BLADDER CATH: CPT

## 2020-10-06 PROCEDURE — 99223 1ST HOSP IP/OBS HIGH 75: CPT | Performed by: INTERNAL MEDICINE

## 2020-10-06 PROCEDURE — 81001 URINALYSIS AUTO W/SCOPE: CPT

## 2020-10-06 PROCEDURE — 94640 AIRWAY INHALATION TREATMENT: CPT

## 2020-10-06 PROCEDURE — 84300 ASSAY OF URINE SODIUM: CPT

## 2020-10-06 PROCEDURE — 6370000000 HC RX 637 (ALT 250 FOR IP): Performed by: NURSE PRACTITIONER

## 2020-10-06 PROCEDURE — 82533 TOTAL CORTISOL: CPT

## 2020-10-06 PROCEDURE — 87147 CULTURE TYPE IMMUNOLOGIC: CPT

## 2020-10-06 PROCEDURE — 99291 CRITICAL CARE FIRST HOUR: CPT | Performed by: INTERNAL MEDICINE

## 2020-10-06 PROCEDURE — 94770 HC ETCO2 MONITOR DAILY: CPT

## 2020-10-06 PROCEDURE — 6370000000 HC RX 637 (ALT 250 FOR IP): Performed by: HOSPITALIST

## 2020-10-06 PROCEDURE — 2500000003 HC RX 250 WO HCPCS: Performed by: INTERNAL MEDICINE

## 2020-10-06 PROCEDURE — 87070 CULTURE OTHR SPECIMN AEROBIC: CPT

## 2020-10-06 PROCEDURE — 2580000003 HC RX 258: Performed by: HOSPITALIST

## 2020-10-06 PROCEDURE — 87581 M.PNEUMON DNA AMP PROBE: CPT

## 2020-10-06 PROCEDURE — 87186 SC STD MICRODIL/AGAR DIL: CPT

## 2020-10-06 PROCEDURE — 87631 RESP VIRUS 3-5 TARGETS: CPT

## 2020-10-06 PROCEDURE — 94660 CPAP INITIATION&MGMT: CPT

## 2020-10-06 PROCEDURE — 87150 DNA/RNA AMPLIFIED PROBE: CPT

## 2020-10-06 PROCEDURE — 87541 LEGION PNEUMO DNA AMP PROB: CPT

## 2020-10-06 PROCEDURE — 82570 ASSAY OF URINE CREATININE: CPT

## 2020-10-06 RX ORDER — 0.9 % SODIUM CHLORIDE 0.9 %
1000 INTRAVENOUS SOLUTION INTRAVENOUS ONCE
Status: COMPLETED | OUTPATIENT
Start: 2020-10-06 | End: 2020-10-06

## 2020-10-06 RX ORDER — KETAMINE HCL IN NACL, ISO-OSM 100MG/10ML
SYRINGE (ML) INJECTION
Status: DISPENSED
Start: 2020-10-06 | End: 2020-10-06

## 2020-10-06 RX ORDER — KETAMINE HCL IN NACL, ISO-OSM 100MG/10ML
SYRINGE (ML) INJECTION
Status: COMPLETED | OUTPATIENT
Start: 2020-10-06 | End: 2020-10-06

## 2020-10-06 RX ORDER — PROPOFOL 10 MG/ML
10 INJECTION, EMULSION INTRAVENOUS
Status: DISCONTINUED | OUTPATIENT
Start: 2020-10-06 | End: 2020-10-14

## 2020-10-06 RX ORDER — SODIUM CHLORIDE 9 MG/ML
INJECTION, SOLUTION INTRAVENOUS CONTINUOUS
Status: DISCONTINUED | OUTPATIENT
Start: 2020-10-06 | End: 2020-10-08

## 2020-10-06 RX ORDER — CHLORHEXIDINE GLUCONATE 0.12 MG/ML
15 RINSE ORAL 2 TIMES DAILY
Status: DISCONTINUED | OUTPATIENT
Start: 2020-10-06 | End: 2020-10-15

## 2020-10-06 RX ORDER — PROPOFOL 10 MG/ML
INJECTION, EMULSION INTRAVENOUS
Status: COMPLETED
Start: 2020-10-06 | End: 2020-10-06

## 2020-10-06 RX ADMIN — DOXYCYCLINE 100 MG: 100 INJECTION, POWDER, LYOPHILIZED, FOR SOLUTION INTRAVENOUS at 10:32

## 2020-10-06 RX ADMIN — GABAPENTIN 800 MG: 400 CAPSULE ORAL at 20:38

## 2020-10-06 RX ADMIN — PROPOFOL 40 MCG/KG/MIN: 10 INJECTION, EMULSION INTRAVENOUS at 06:37

## 2020-10-06 RX ADMIN — Medication 100 MG: at 06:36

## 2020-10-06 RX ADMIN — SODIUM CHLORIDE 1000 ML: 9 INJECTION, SOLUTION INTRAVENOUS at 13:34

## 2020-10-06 RX ADMIN — PROPOFOL 40 MCG/KG/MIN: 10 INJECTION, EMULSION INTRAVENOUS at 08:37

## 2020-10-06 RX ADMIN — ASPIRIN 81 MG: 81 TABLET, CHEWABLE ORAL at 08:30

## 2020-10-06 RX ADMIN — GABAPENTIN 800 MG: 400 CAPSULE ORAL at 08:29

## 2020-10-06 RX ADMIN — CITALOPRAM 20 MG: 20 TABLET, FILM COATED ORAL at 08:30

## 2020-10-06 RX ADMIN — SODIUM CHLORIDE, PRESERVATIVE FREE 10 ML: 5 INJECTION INTRAVENOUS at 20:38

## 2020-10-06 RX ADMIN — INSULIN LISPRO 4 UNITS: 100 INJECTION, SOLUTION INTRAVENOUS; SUBCUTANEOUS at 11:35

## 2020-10-06 RX ADMIN — Medication 15 ML: at 11:42

## 2020-10-06 RX ADMIN — PANTOPRAZOLE SODIUM 40 MG: 40 TABLET, DELAYED RELEASE ORAL at 06:16

## 2020-10-06 RX ADMIN — INSULIN GLARGINE 30 UNITS: 100 INJECTION, SOLUTION SUBCUTANEOUS at 20:33

## 2020-10-06 RX ADMIN — SODIUM CHLORIDE, PRESERVATIVE FREE 10 ML: 5 INJECTION INTRAVENOUS at 08:30

## 2020-10-06 RX ADMIN — PROPOFOL 25 MCG/KG/MIN: 10 INJECTION, EMULSION INTRAVENOUS at 16:48

## 2020-10-06 RX ADMIN — PIPERACILLIN AND TAZOBACTAM 3.38 G: 3; .375 INJECTION, POWDER, LYOPHILIZED, FOR SOLUTION INTRAVENOUS at 16:46

## 2020-10-06 RX ADMIN — MIDODRINE HYDROCHLORIDE 5 MG: 5 TABLET ORAL at 16:47

## 2020-10-06 RX ADMIN — SODIUM CHLORIDE 1000 ML: 9 INJECTION, SOLUTION INTRAVENOUS at 09:20

## 2020-10-06 RX ADMIN — PIPERACILLIN AND TAZOBACTAM 3.38 G: 3; .375 INJECTION, POWDER, LYOPHILIZED, FOR SOLUTION INTRAVENOUS at 08:36

## 2020-10-06 RX ADMIN — SENNOSIDES 8.6 MG: 8.6 TABLET, FILM COATED ORAL at 20:38

## 2020-10-06 RX ADMIN — Medication 10 MCG/MIN: at 14:38

## 2020-10-06 RX ADMIN — MIDODRINE HYDROCHLORIDE 5 MG: 5 TABLET ORAL at 11:36

## 2020-10-06 RX ADMIN — ALLOPURINOL 200 MG: 100 TABLET ORAL at 08:30

## 2020-10-06 RX ADMIN — GLYCOPYRROLATE AND FORMOTEROL FUMARATE 2 PUFF: 9; 4.8 AEROSOL, METERED RESPIRATORY (INHALATION) at 21:01

## 2020-10-06 RX ADMIN — PROPOFOL 35 MCG/KG/MIN: 10 INJECTION, EMULSION INTRAVENOUS at 12:17

## 2020-10-06 RX ADMIN — ATORVASTATIN CALCIUM 40 MG: 40 TABLET, FILM COATED ORAL at 20:38

## 2020-10-06 RX ADMIN — DOXYCYCLINE 100 MG: 100 INJECTION, POWDER, LYOPHILIZED, FOR SOLUTION INTRAVENOUS at 22:07

## 2020-10-06 RX ADMIN — INSULIN LISPRO 4 UNITS: 100 INJECTION, SOLUTION INTRAVENOUS; SUBCUTANEOUS at 08:18

## 2020-10-06 RX ADMIN — PIPERACILLIN AND TAZOBACTAM 3.38 G: 3; .375 INJECTION, POWDER, LYOPHILIZED, FOR SOLUTION INTRAVENOUS at 01:26

## 2020-10-06 RX ADMIN — MIDODRINE HYDROCHLORIDE 5 MG: 5 TABLET ORAL at 08:29

## 2020-10-06 RX ADMIN — ARIPIPRAZOLE 15 MG: 15 TABLET ORAL at 08:30

## 2020-10-06 RX ADMIN — FOLIC ACID 1 MG: 1 TABLET ORAL at 08:30

## 2020-10-06 RX ADMIN — VANCOMYCIN HYDROCHLORIDE 1750 MG: 5 INJECTION, POWDER, LYOPHILIZED, FOR SOLUTION INTRAVENOUS at 01:25

## 2020-10-06 RX ADMIN — SODIUM CHLORIDE, PRESERVATIVE FREE 10 ML: 5 INJECTION INTRAVENOUS at 01:25

## 2020-10-06 RX ADMIN — ENOXAPARIN SODIUM 30 MG: 30 INJECTION SUBCUTANEOUS at 20:34

## 2020-10-06 RX ADMIN — ENOXAPARIN SODIUM 30 MG: 30 INJECTION SUBCUTANEOUS at 09:21

## 2020-10-06 RX ADMIN — THERA TABS 1 TABLET: TAB at 08:30

## 2020-10-06 RX ADMIN — PROPOFOL 25 MCG/KG/MIN: 10 INJECTION, EMULSION INTRAVENOUS at 20:32

## 2020-10-06 RX ADMIN — SODIUM CHLORIDE: 9 INJECTION, SOLUTION INTRAVENOUS at 17:09

## 2020-10-06 RX ADMIN — Medication 2000 UNITS: at 08:29

## 2020-10-06 RX ADMIN — GLYCOPYRROLATE AND FORMOTEROL FUMARATE 2 PUFF: 9; 4.8 AEROSOL, METERED RESPIRATORY (INHALATION) at 10:37

## 2020-10-06 RX ADMIN — SODIUM CHLORIDE: 9 INJECTION, SOLUTION INTRAVENOUS at 10:27

## 2020-10-06 RX ADMIN — INSULIN LISPRO 3 UNITS: 100 INJECTION, SOLUTION INTRAVENOUS; SUBCUTANEOUS at 16:45

## 2020-10-06 ASSESSMENT — PULMONARY FUNCTION TESTS
PIF_VALUE: 29
PIF_VALUE: 31
PIF_VALUE: 31
PIF_VALUE: 29
PIF_VALUE: 31

## 2020-10-06 NOTE — FLOWSHEET NOTE
10/06/20 0040   Provider Notification   Reason for Communication Review case   Provider Name Yamilet Hood   Provider Notification Advance Practice Clinician (CNS, NP, CNM, CRNA, PA)   Method of Communication Secure Message   Response Waiting for response   Notification Time 0040   Message sent regarding pt breathing rate at this time, awaiting response at this time.

## 2020-10-06 NOTE — PROGRESS NOTES
Assisted Dr. Kay Hansen with intubation. Placed directly on vent per protocol due to being COVID positive, bilateral breath sounds heard post intubation. Dr. Kay Hansen adjusted vent settings. CXR to confirm placement. Patient has an 8.0 at the 23cm.

## 2020-10-06 NOTE — PROCEDURES
ICU PROCEDURE - ENDOTRACHEAL INTUBATION    Keshav Arriaza     MRN#: [id number]  10/6/20      Milagros [email or web address]     : 1970      INDICATION: Hypoxemic respiratory failure with progressive encephalopathy. TIME OUT: taken    Urgent. ANESTHESIA:   [x]Ketamine  []Ativan  [] Morphine  [x]Propofol  []Other medications:      ESTIMATED BLOOD LOSS:  None. COMPLICATIONS:  [x]N/A  [] Other:    LARYNGOSCOPIC AIRWAY GRADE (CORMACK-LEHANE):[]1  []2a  []2b [x]3  []4        INTUBATION EQUIPMENT USED:  [x] Direct laryngoscope only    OUTCOME: Successful placement of #  8  Taperguard Evac endotracheal via   [x]Oral route    INSERTION DEPTH:  23    cm from   [x]lip           CONFIRMATION OF TUBE POSITION:   [x]Capnography - Strong & repeatable exhaled CO2 detection   [x]Multiple point auscultation   [x]SpO2 response   [x]STAT X-ray   []Bronchoscopic assessment    UNUSUAL FINDINGS: Significant oral crowding. PROCEDURE:     Using direct laryngoscopy, the vocal cords were visualized and the endotracheal tube was placed through the cords under direct vision. Good breath sounds were auscultated bilaterally without sounds over abdomen. Appropriate strong & repeatable exhaled CO2 detection was confirmed.        Electronically signed by Nancy Granda MD on 10/6/2020 at 10:55 AM

## 2020-10-06 NOTE — PLAN OF CARE
Problem: Nutrition  Goal: Optimal nutrition therapy  10/6/2020 1329 by Justen Canales RD, LD  Outcome: Ongoing   Nutrition Problem #1: Inadequate oral intake  Intervention: Food and/or Nutrient Delivery: Continue NPO, Start Tube Feeding  Nutritional Goals: Patient will tolerate EN adequate for active late phase covid infection

## 2020-10-06 NOTE — PROGRESS NOTES
[address]  PHYSICAL THERAPY MISSED TREATMENT NOTE  [facility] CV[facility] 4B    Date: 10/6/2020  Patient Name: Keshav Arriaza        MRN: [id number]   : 1970  (44 y.o.)  Gender: male                REASON FOR MISSED TREATMENT:  Per discussion with nursing, pt was placed on vent this morning. Will attempt PT evaluation at a later date when appropriate.  Luis Ramírez, PT, DPT

## 2020-10-06 NOTE — PROGRESS NOTES
Alerted NP at this time of pt mentation changes, pt is answering yes or no to questions at this time, but is not forming sentences on his own. Pt respirations continue to be between 30-40/minute, bladder scan performed at this time revealing no urine, pt states he does not have the urge to urinate at this time. Will continue to monitor.

## 2020-10-06 NOTE — CARE COORDINATION
10/6/20, 3:11 PM EDT    DISCHARGE ON GOING EVALUATION    Kadie Das day: 13  Location: -06/006-A Reason for admit: Acute respiratory failure with hypoxemia ([address] [J96.01]  Acute respiratory failure with hypoxia ([address] [J96.01]   Procedure:   9/23 CXR: Bilateral pulmonary opacification worse than on previous study dated 10 September 2020. This may represent worsening inflammatory process, possibly Covid 19 infection; borderline cardiomegaly  9/23 Intubated  9/24 CVC left subclavian - 9/30 removed  9/95 PICC right basilic  01/8 Extubated to bipap  10/6 Reintubated  10/6 CXR:   1. There is a new endotracheal tube with the distal tip 1.8 cm above the level of the madi.         2. There is a new esophageal tube with the distal tip projecting over the gastric fundus.         3. There is a stable right PICC with the distal tip projecting over the right atrium.         4. The lung volumes are diminished. There are bilateral perihilar and the basilar opacities which are similar to the previous examination however may be accentuated by the expiratory technique. There is no pleural effusion. Follow-up chest radiographs    are recommended to confirm complete resolution. Treatment Plan of Care: Became more tachypneic overnight and placed on bipap, settings adjusted. Fever, plan for picc removal and new picc. Required reintubation this morning d/t progressive lethargy and subsequent obtundation. Nephrology consulted for acute oliguric renal failure in less than 24 hours. Renal US ordered. ATBs changed. Remains on vent w/ETT on PCMV, Peep 10, FIO2 50%, sats 100%. Tmax 103.4. NSR. Unable to follow commands/ WEISS x4 to painful stim. Dietitian consulted. PT/OT. Respiratory culture + staph aureus by PCR.  Cardiac monitoring, I&O, daily weight, oral care, OG to LIWS, flexiseal, mason care. Levo @ 9 mcg/min, diprivan @ 30 mcg/kg/min, Allopurinol, abilify, asa, lipitor, celexa, IV doxycycline, lovenox bid, folic acid, neurontin, inhaler, lantus, SSI ACHS, midodrine, protonix, IV zosyn, vit D,  Electrolyte replacement protocols. Received 2L in fld bolus. K+ 3.4, BUN 28, Creat up to 3.3, procal up to 15.22, ck 725, alb 3.1, wbc up to 37.4, hgb 9.7. Urine w/small leukocytes. Urine + myoglobin. Barriers to Discharge: on vent  PCP: Yoko Reddy MD  Readmission Risk Score: 47%  Patient Goals/Plan/Treatment Preferences: From home alone and current [facility] for RN/PT/OT. SW on case. Plan pending course - probable TCU vs LTACH.

## 2020-10-06 NOTE — PLAN OF CARE
Problem: Falls - Risk of:  Goal: Will remain free from falls  Description: Will remain free from falls  10/6/2020 0141 by Arlene Rivera RN  Outcome: Ongoing  Note: Patient remained free from falls this shift. Bed is in low position with alarm on and siderails up x2. Education given on use of call light and patient voiced understanding. Call light and beside table within reach. Arm band and falling star in place. Hourly rounds completed. Will continue to monitor. Problem: Skin Integrity:  Goal: Absence of new skin breakdown  Description: Absence of new skin breakdown  10/6/2020 0141 by Arlene Rivera RN  Outcome: Ongoing  Note: No signs of new skin breakdown noted with each assessment this shift. Skin warm, dry, and intact except where otherwise noted in head-to-toe assessment. Mucous membranes pink and moist. Assistance with turns/ambulation given as needed. Problem: Nutrition  Goal: Optimal nutrition therapy  10/6/2020 0141 by Arlene Rivera RN  Outcome: Ongoing  Note: Pt is currently on a carb control diet with oral fluids encouraged at this time, will continue to monitor. Problem: Serum Glucose Level - Abnormal:  Goal: Ability to maintain appropriate glucose levels will improve  Description: Ability to maintain appropriate glucose levels will improve  10/6/2020 0141 by Arlene Rivera RN  Outcome: Ongoing  Note: Pt is currently being monitored on an ACHS basis, will continue to monitor. Plan of care discussed with pt and family. Pt verbalizes understand.

## 2020-10-06 NOTE — PROGRESS NOTES
0900- Updated Dr. Bib Benavides on labwork, including critical creatinine level, unable to place peripheral IV's, and no urine output overnight. Stated to replace mason and maintain PICC line  - will follow blood culture results. EKG shown to Dr. Bib Benavides. [address] patients father called and updated on plan of care. 18- Dr. Halima Broderick on unit to evaluate patient. 5796- Updated Dr. Bib Benavides on pnuemonia panel results.

## 2020-10-06 NOTE — PROGRESS NOTES
Present to pt room for skin assessment. Pt laying in bed with charge nurse at side. Assists pt to turn to right side. Pt noted to have liquid stool. Cleaned up pt. Pt bilateral buttocks including kash rectal area and gluteal cleft skin intact without any redness or breakdown. Primary nurse inserts flexiseal for stool management. Zinc barrier cream applied for preventative measures. Changed moisture wicking chux pads. . Bilateral heels blanching. Pillow support placed under right side. Pt has mason catheter in place. Pt on jennifer TUCKER support surface. Bilateral heels offloaded with pillows. Staff to continue to turn every 2 hours, keep HOB below 30 degrees. Bed in low, call light in reach. Thank you for allowing us to participate in the care of your patient. TIME   Wound/ostomy individual minutes  Time In: 1400  Time Out: 1425  Minutes: 25  Time does not include documentation.

## 2020-10-06 NOTE — PROGRESS NOTES
Patient:  Holger Aguilar                    Unit/Bed:4B-04/004-A  DNV: [id number]            Lauren Mirza MD  Date of Admission: 9/23/2020     Assessment and Plan(All pulmonary edema, renal failure, PE, and respiratory failure diagnoses are acute in nature unless otherwise specified):        1. Acute hypoxemic respiratory failure: Patient extubated 10/2/2020. On low-flow oxygen. Although patient had ongoing radiographic improvement, he had progressive lethargy and subsequent obtundation. Patient intubated 10/6/2020 for hypercarbic hypoxemic respiratory failure. .  2. Acute lung injury: Radiographically improving. Reassess after patient undergoes volume resuscitation. 3. Acute renal failure: Patient has no IV contrast exposure. Acute deterioration 10/6/2020. Renal ultrasound pending. Fractional excretion of sodium pending. Patient undergoing volume resuscitation. Vasculitis screen pending. Previously on vancomycin. Consult nephrology. High risk of requiring dialysis. 4. COVID-19: Convalescent plasma received 9/24/2020. Clinically resolved. 5. Sepsis: Secondary to pneumonia and COVID-19. Awaiting cortisol screen to differentiate septic shock from sepsis. Associated with tachypnea and pneumonia. Subsequent progression to acute renal failure. Patient developed fever on 10/5/2020 up to 103. This occurred while on vancomycin. Zosyn and doxycycline initiated 10/6/2020. Repeat cultures pending. Blood cultures pending. Lung lavage for culture and PCR pending. 6. Hypotension: Status post pressors and volume resuscitation.    Screen for adrenal insufficiency was negative. Continues to require pressors. 7. Type 2 diabetes mellitus: Subcutaneous insulin. 8. Diastolic heart failure: On calcium channel blocker and diuretic. Amlodipine on hold secondary to hypotension. 9. Obstructive sleep apnea: Secondary morbid obesity. CPAP/BiPAP noncompliant.   10. Hyperlipidemia: On atorvastatin. 11. Hypertension:  Currently hypotensive. Amlodipine discontinued. 12. Gout: On allopurinol. 13. Pneumonia: Secondary to MRSA. Status post 8 days of vancomycin. Radiographically improving. New onset fever unlikely to represent failed antibiotic with vancomycin. Vancomycin discontinued 10/6/2020. Resolved. .     CC: Respiratory failure  HPI: Patient is a 68-year-old morbidly obese black male lifetime non-smoker. Blanca Gandhi has a history of morbid obesity associated with type 2 diabetes mellitus, prior alcohol abuse, hyperlipidemia, hypertension, hypogonadism, nonalcoholic steatohepatitis, obstructive sleep apnea, and gout.  Patient was hospitalized 9/6/2020 through 9/15/2020 with hypoxemic respiratory failure secondary to COVID-19 associated with diffuse bilateral infiltrates.  At that time, patient received Decadron, remdesivir, and danazol.  During hospitalization, he had issues with atrial fibrillation.  His insulin therapy required adjustment.  He was discharged home on subcutaneous Lovenox. Patient returned back to the emergency room on 9/23/2020 with increasing SOB and progressive hypoxia. CXR showed diffuse infiltrates.  Deteriorated and required intubation. Patient underwent bronchoscopy on 9/27/2020 which demonstrated no mucus production. Patient extubated 10/2/2020. Patient reintubated for progressive respiratory failure with progressive obtundation on 10/6/2020. This was associated with acute oliguric renal failure. Patient was intubated 10/6/2020, and underwent volume resuscitation. For further details, please review the assessment and plan. ROS:  Patient was very difficult to arouse prior to intubation. Currently sedated on mechanical ventilator. Bri Tolentino PMH:  Per HPI  SHX: Lifetime non-smoker  FHX: Positive for heart disease.   Allergies: PCN  Medications:     propofol 40 mcg/kg/min (10/06/20 0862)    sodium chloride 150 mL/hr at 10/06/20 1027    norepinephrine 15 mcg/min (10/06/20 3806)    dextrose        doxycycline (VIBRAMYCIN) IV  100 mg Intravenous Q12H    ketamine        enoxaparin  30 mg Subcutaneous BID    citalopram  20 mg Oral Daily    insulin glargine  30 Units Subcutaneous Nightly    midodrine  5 mg Oral TID WC    lidocaine 1 % injection  5 mL Intradermal Once    sodium chloride flush  10 mL Intravenous 2 times per day    piperacillin-tazobactam  3.375 g Intravenous Q8H    multivitamin  1 tablet Oral Daily    pantoprazole  40 mg Oral QAM AC    insulin lispro  0-6 Units Subcutaneous TID WC    insulin lispro  0-3 Units Subcutaneous Nightly    magnesium replacement protocol   Other RX Placeholder    phosphorus replacement protocol   Other RX Placeholder    calcium replacement protocol   Other RX Placeholder    polyethylene glycol  17 g Oral Every Other Day    allopurinol  200 mg Oral Daily    ARIPiprazole  15 mg Oral Daily    aspirin  81 mg Oral Daily    atorvastatin  40 mg Oral Nightly    Vitamin D  2,000 Units Oral Daily    folic acid  1 mg Oral Daily    gabapentin  800 mg Oral BID    sodium chloride flush  10 mL Intravenous 2 times per day    glycopyrrolate-formoterol  2 puff Inhalation BID    senna  1 tablet Oral BID     Vital Signs:   T: 98.9: P: 68: RR: 19: B/P: 112/61: FiO2: 50: O2 Sat: 99: I/O: 1467/300  GCS:  8:  Body mass index is 43.04 kg/m². [facility] General:   Morbidly obese black male. HEENT:  normocephalic and atraumatic.  No scleral icterus. PERR  Neck: supple.  No Thyromegaly. Lungs:  Mild upper airway retained secretions. Tachypnea.  No retractions. Prior to intubation the patient had BiPAP dyssynchrony. Cardiac: RRR.  No JVD. Abdomen: soft.  Nontender.  Large panniculus. Extremities:  No clubbing, cyanosis x 4. No lower extremity edema bilaterally. Vasculature: capillary refill < 3 seconds. Palpable dorsalis pedis pulses. Skin:  warm and dry. Psych:   Patient very lethargic prior to intubation. Could not focus.   Unable to answer questions prior to intubation. Currently sedated on mechanical ventilator. Shaaron Money Lymph:  No supraclavicular adenopathy. Neurologic:  No focal deficit. No seizures.     Data: (All radiographs, tracings, PFTs, and imaging are personally viewed and interpreted unless otherwise noted). · Telemetry shows sinus rhythm. · Echocardiogram shows an ejection fraction of 60%. .  · Sputum collected 9/23/2020: Positive for MRSA. · Chest x-ray shows improvement in bilateral infiltrates. Adequate positioning of endotracheal tube. · Sodium 135, potassium 3.4, chloride 94, bicarb 26, BUN 28, creatinine 3.3, glucose 315. Albumin 3.1. White blood cell count 37.4, hemoglobin 9.7, platelets 813. Cortisol 16.36. .     CC time 35 minutes.  Does not include procedures. Time was discontiguous. Electronically signed by Illa Blander Alyson Kehr M.D.

## 2020-10-06 NOTE — PROGRESS NOTES
Comprehensive Nutrition Assessment    Type and Reason for Visit:  Reassess, Consult(TF ordering and management)    Nutrition Recommendations/Plan:   Start Vital 1.2 at 10 ml/hr, if tolerates after 24 hours increase to 20 ml/hr  Water flushes per MD  Continue multivitamin  Continue bowel meds per MD order    Nutrition Assessment:  Pt. declining from a nutritional standpoint AEB NPO, intubated. Remains at risk for further nutritional compromise r/t admitted with acute respiratory failure with hypoxemia, re-intubated 10/6, convalescent plasma 9/24, obesity, elevated Hbg A1C of 9.9,  and underlying medical condition (hx: CAD, DM, GERD, Alcohol abuse, HLD, HTN, Vitamin D deficiency). Nutrition recommendations/interventions as per above. Malnutrition Assessment:  Malnutrition Status:  Insufficient data(+covid)    Context:  Acute Illness     Findings of the 6 clinical characteristics of malnutrition:  Energy Intake:  No significant decrease in energy intake(great appetite noted 9/14/20. NPO x 1 day)  Weight Loss:  (-14# in 7 weeks, note current weight is stated so not reliable)     Body Fat Loss:  Unable to assess(+covid patient)     Muscle Mass Loss:  Unable to assess(+covid)    Fluid Accumulation:  1 - Mild Extremities   Strength:  Not Performed    Estimated Daily Nutrient Needs:  Energy (kcal):  2203-2242 (30-32 kcals/kg IBW in active late phase); Weight Used for Energy Requirements:  Ideal(70 kg - ideal weight)     Protein (g):  ~175 (2.5 grams/kg ideal weight); Weight Used for Protein Requirements:  Ideal(70 kg)        Fluid (ml/day):  per MD; Weight Used for Fluid Requirements:  (n/a)      Nutrition Related Findings:  +covid. Intubated 9/23, extubated 10/2, and re-intubated 10/6 for hypercarbic hypoxemia respiratory failure. Spoke to Dr. Marylen Perish and he states that it is OK to start EN. Will start trophic feeds. +BM noted 10/6. Patient received convalescent plasma 9/24.  Labs: Potassium: 3.4, BUN: 28,

## 2020-10-06 NOTE — PLAN OF CARE
Problem: Impaired respiratory status  Goal: Patient will achieve/maintain normal respiratory rate/effort  Outcome: Ongoing  Note: Patient currently on BiPAP 20/8 BU 12 and 30%. RR has been 30's-40's all Leodan Frazier CNP aware.

## 2020-10-06 NOTE — CONSULTS
[facility]          [facility]        [address]        [place] [facility]        [phone]           Inpatient Initial consult note         10/6/2020 1:18 PM    Patient Name:   Jimbo Verdugo  YOB: 1970  Primary Care Physician:  Yanick Hernandez MD     History Obtained From:  electronic medical record     Consultation requested by : Asim Carreon MD    requested for  : Evaluation of  worsening renal function     History of presentingillness   Jibmo Verdugo is a 44 y.o.   male with Past Medical History as stated below presented with a chief complaint of Fatigue   on 9/23/2020 . Accurate history and review of systems cannot be obtained as the patient is intubated    Patient was initially admitted on 9/6/2020 for almost 10 days with hypoxemic respiratory failure secondary to COVID-19. Patient did receive Decadron around the severe and danazol. Patient returned back to the ER on 9/23 with worsening shortness of breath and progressive hypoxia he has deteriorated further which required intubation he has undergone bronchoscopy on 9/27. Patient was subsequently extubated on 10/2/2020. However earlier this morning patient became more obtunded and progressive respiratory failure associated with acute oliguric renal failure as well so he was reintubated this morning. Patient's creatinine has been running close to 1 until yesterday however this morning significantly worsened. During the hospitalization patient has been receiving Lasix 40 mg daily which has been discontinued this morning. Patient has also been getting Zosyn and vancomycin which has been discontinued this morning his last dose was around 1:25 AM today.   His last vancomycin level was 14.5 on 10/3/2020   Patient also has been hypotensive and has required pressor support his urine output also have declined significantly    Past History      Past Medical History: Diagnosis Date    Arthritis     RHEUMATOID    Atrial fibrillation (HCC)     BPH (benign prostatic hyperplasia)     CAD (coronary artery disease)     Carpal tunnel syndrome on right     rt hand    Chronic gout     DM2 (diabetes mellitus, type 2) (HCC)     GERD (gastroesophageal reflux disease)     Heart failure with preserved ejection fraction (HCC)     History of alcohol abuse     History of osteomyelitis     Hx of R foot wound, osteomyelitis July 2018 requiring surgery and IV Vanco    Hyperlipidemia     Hypertension, essential     Hypogonadism, male     Major depression     Mood disorder ([facility]     Morbid obesity ([facility]     Muscle weakness     DURAN (nonalcoholic steatohepatitis)     Obstructive sleep apnea treated with continuous positive airway pressure (CPAP)     KIARA (obstructive sleep apnea)     Vitamin D deficiency      Past Surgical History:   Procedure Laterality Date    FOOT SURGERY Right     2018, R foot osteomyelitis    HIP SURGERY Left 6/29/2020    bilateral hip steroid injection, Left HIP FIRST performed by Ami Toro [facility] at [facility]      as a baby     Social History     Socioeconomic History    Marital status:      Spouse name: Not on file    Number of children: Not on file    Years of education: Not on file    Highest education level: Not on file   Occupational History    Not on file   Social Needs    Financial resource strain: Not on file    Food insecurity     Worry: Not on file     Inability: Not on file   GLOG needs     Medical: Not on file     Non-medical: Not on file   Tobacco Use    Smoking status: Never Smoker    Smokeless tobacco: Never Used   Substance and Sexual Activity    Alcohol use: Not Currently     Comment: hx of abuse    Drug use: No    Sexual activity: Not Currently   Lifestyle    Physical activity     Days per week: Not on file     Minutes per session: Not on file    Stress: Not on file   Relationships    Social connections     Talks on phone: Not on file     Gets together: Not on file     Attends Worship service: Not on file     Active member of club or organization: Not on file     Attends meetings of clubs or organizations: Not on file     Relationship status: Not on file    Intimate partner violence     Fear of current or ex partner: Not on file     Emotionally abused: Not on file     Physically abused: Not on file     Forced sexual activity: Not on file   Other Topics Concern    Not on file   Social History Narrative    Not on file     Family History   Problem Relation Age of Onset    Heart Disease Mother         MI    Cancer Paternal Aunt         lung     Medications & Allergies      Prior to Admission medications    Medication Sig Start Date End Date Taking? Authorizing Provider   vitamin C (ASCORBIC ACID) 500 MG tablet Take 2 tablets by mouth daily 9/15/20   SANDRA Lew CNP   enoxaparin (LOVENOX) 120 MG/0.8ML injection Inject 0.87 mLs into the skin every 12 hours 9/15/20   SANDRA Lew CNP   aspirin 81 MG chewable tablet Take 1 tablet by mouth daily 9/16/20   SANDRA Lew CNP   CHOLECALCIFEROL PO Take 2,000 Units by mouth daily    Historical Provider, MD   atorvastatin (LIPITOR) 40 MG tablet Take 40 mg by mouth nightly    Historical Provider, MD   acetaminophen (TYLENOL) 325 MG tablet Take 650 mg by mouth every 4 hours as needed for Pain    Historical Provider, MD   benzocaine (BABY ORAJEL) 7.5 % oral gel Take by mouth 4 times daily as needed for Pain (mouth pain) Take by mouth 3 times daily. Historical Provider, MD   guaiFENesin (ROBITUSSIN) 100 MG/5ML syrup Take 200 mg by mouth every 4 hours as needed for Cough    Historical Provider, MD   oxyCODONE-acetaminophen (PERCOCET) 5-325 MG per tablet Take 1 tablet by mouth every 4 hours as needed for Pain.      Historical Provider, MD   busPIRone (BUSPAR) 10 MG tablet Take 10 mg by mouth 2 times daily Historical Provider, MD   Trolamine Salicylate (ASPERCREME) 10 % LOTN Apply topically as needed for Pain 5/22/20   Ge Montano MD   pantoprazole (PROTONIX) 40 MG tablet Take 1 tablet by mouth daily 5/22/20   Ge Montano MD   allopurinol (ZYLOPRIM) 300 MG tablet Take 1 tablet by mouth daily  Patient taking differently: Take 500 mg by mouth daily  4/9/20   Yaron Mauricio,    allopurinol (ZYLOPRIM) 100 MG tablet Take 2 tablets by mouth daily 4/9/20   Tori Escobedo DO   lidocaine (XYLOCAINE) 5 % ointment Apply topically as needed to painful areas on lower back, hips, knees, and feet 3/11/20   SANDRA Kim CNP   esomeprazole Magnesium (NEXIUM) 20 MG PACK Take 20 mg by mouth daily    Historical Provider, MD   hydrALAZINE (APRESOLINE) 50 MG tablet Take 50 mg by mouth 2 times daily     Historical Provider, MD   Wound Dressings (MAXORB EX) Apply topically    Historical Provider, MD   polyethylene glycol (GLYCOLAX) powder Take 17 g by mouth every other day     Historical Provider, MD   Multiple Vitamins-Minerals (THERAPEUTIC MULTIVITAMIN-MINERALS) tablet Take 1 tablet by mouth daily    Historical Provider, MD   Diaper Rash Products (PINXAV) OINT Apply topically    Historical Provider, MD   Probiotic Product (SOBIA-BID PROBIOTIC PO) Take 1 tablet by mouth daily     Historical Provider, MD   ondansetron (ZOFRAN) 4 MG tablet Take 4 mg by mouth every 8 hours as needed for Nausea or Vomiting    Historical Provider, MD   diltiazem (CARDIZEM) 60 MG tablet Take 60 mg by mouth 2 times daily    Historical Provider, MD   tamsulosin (FLOMAX) 0.4 MG capsule Take 0.4 mg by mouth nightly     Historical Provider, MD   folic acid (FOLVITE) 1 MG tablet Take 1 mg by mouth daily    Historical Provider, MD   furosemide (LASIX) 40 MG tablet Take 40 mg by mouth daily    Historical Provider, MD   gabapentin (NEURONTIN) 400 MG capsule Take 800 mg by mouth 2 times daily.      Historical Provider, MD   insulin lispro (HUMALOG) 100 UNIT/ML injection vial Inject into the skin 3 times daily (before meals) Per scale: 151-200=1 unit, 201-250=2 units, 251-300-3 units, 301-350=4 units, 351-400=5 units. Historical Provider, MD   sitaGLIPtan-metformin (JANUMET)  MG per tablet Take 1 tablet by mouth 2 times daily (with meals)    Historical Provider, MD   insulin glargine (LANTUS) 100 UNIT/ML injection vial Inject 10 Units into the skin nightly     Historical Provider, MD   senna (SENOKOT) 8.6 MG tablet Take 1 tablet by mouth 2 times daily    Historical Provider, MD   diazepam (VALIUM) 5 MG tablet Take 5 mg by mouth every 6 hours as needed for Anxiety. Historical Provider, MD   ARIPiprazole (ABILIFY PO) Take 15 mg by mouth daily     Historical Provider, MD   amlodipine (NORVASC) 10 MG tablet Take 5 mg by mouth daily     Historical Provider, MD   Citalopram Hydrobromide (CELEXA PO) Take 30 mg by mouth daily From [employer]     Historical Provider, MD   Blood Glucose Monitoring Suppl (FREESTYLE LITE) ARTIE Patient needs all supplies for qd testing.  DX: 250.00 8/18/11   Yanick Hernandez MD     Allergies: Pcn [penicillins]  IP meds : Scheduled Meds:   doxycycline (VIBRAMYCIN) IV  100 mg Intravenous Q12H    ketamine        enoxaparin  30 mg Subcutaneous BID    chlorhexidine  15 mL Mouth/Throat BID    sodium chloride  1,000 mL Intravenous Once    citalopram  20 mg Oral Daily    insulin glargine  30 Units Subcutaneous Nightly    midodrine  5 mg Oral TID WC    lidocaine 1 % injection  5 mL Intradermal Once    sodium chloride flush  10 mL Intravenous 2 times per day    piperacillin-tazobactam  3.375 g Intravenous Q8H    multivitamin  1 tablet Oral Daily    pantoprazole  40 mg Oral QAM AC    insulin lispro  0-6 Units Subcutaneous TID WC    insulin lispro  0-3 Units Subcutaneous Nightly    magnesium replacement protocol   Other RX Placeholder    phosphorus replacement protocol   Other RX Placeholder    Labs, Radiology and Tests       Recent Labs     10/04/20  0400 10/05/20  1625 10/06/20  0456   WBC 13.2* 29.8* 37.4*   RBC 3.69* 3.83* 3.41*   HGB 10.6* 11.2* 9.7*   HCT 34.1* 36.3* 31.5*   MCV 92.4 94.8* 92.4   MCH 28.7 29.2 28.4   MCHC 31.1* 30.9* 30.8*    402* 407*     Recent Labs     10/04/20  0400 10/05/20  1625 10/06/20  0456    137 135   K 3.6 4.2 3.4*   CL 98 98 94*   CO2 31 25 26   BUN 14 15 28*   CREATININE 0.8 1.0 3.3*   CALCIUM 9.1 9.7 9.6   PROT 5.9* 6.8 6.8   LABALBU 3.3* 2.6* 3.1*   BILITOT 0.5 0.8 1.1   ALKPHOS 65 77 77   AST 19 26 29   ALT 37 30 28       Radiology : Chest x-ray-reviewed by me shows small opacity on the right hilar region, worse poor inspiratory effort and no significant congestion noted    Other : Whole lab data shows that the patient's baseline creatinine runs close to 1.0-1.1    Assessment    Renal -acute kidney injury with rapid worsening of renal function. This appears to be multifactorial etiology  - Which include septic ATN/hypotension. Also patient has been receiving vancomycin and Zosyn there is also possibility of vancomycin toxicity. - Patient's last dose was at 2 AM this morning we will check a vancomycin random level now. - Meanwhile agree with IV hydration and monitor renal function closely  - If worsens further patient might need renal replacement therapy. Mild hypokalemia-replace and follow for now  Hypotension on pressors wean to a map greater than 65  Sepsis mostly due to pneumonia/COVID-19-getting antibiotics  Hx of diabetes mellitus  Hx of COVID-19 pneumonia  Ventilator dependent respiratory failure  Elevated CPK level-nonspecific recheck again in the morning. meds reviewed and D/W patients nurse in detail      **This report has been created using voice recognition software. It maycontain minor  errors which are inherent in voice recognition technology. **    Sawyer Frazier M.D  [facility].

## 2020-10-06 NOTE — PLAN OF CARE
Problem: Falls - Risk of:  Goal: Will remain free from falls  Description: Will remain free from falls  10/6/2020 1512 by Mandi Valerio RN  Outcome: Ongoing  Note: Remains free from falls - remains in bed this shift, fall precautions in place. Problem: Falls - Risk of:  Goal: Absence of physical injury  Outcome: Ongoing  Note: Remains free from injury     Problem: Skin Integrity:  Goal: Will show no infection signs and symptoms  Description: Will show no infection signs and symptoms  Outcome: Ongoing  Note: Remains on antibiotics     Problem: Skin Integrity:  Goal: Absence of new skin breakdown  Description: Absence of new skin breakdown  10/6/2020 1512 by Mandi Valerio RN  Outcome: Ongoing  Note: No new skin breakdown noted. Patient turned and repositioned q2h and prn, skin care completed. Problem: Discharge Planning:  Goal: Discharged to appropriate level of care  Description: Discharged to appropriate level of care  10/6/2020 1512 by Mandi Valerio RN  Outcome: Ongoing  Note: Remains in ICU on 4B     Problem: Urinary Retention:  Goal: Urinary elimination within specified parameters  Description: Urinary elimination within specified parameters  Outcome: Ongoing  Note: Remains with low urine output - nephrology consulted. Problem: Nutrition  Goal: Optimal nutrition therapy  10/6/2020 1512 by Mandi Valerio RN  Outcome: Ongoing  Note: Initiated tube feed. Problem: Impaired respiratory status  Goal: Patient will achieve/maintain normal respiratory rate/effort  10/6/2020 1512 by Mandi Valerio RN  Outcome: Ongoing  Note: Patient remains with ventilator tolerance since intubation this AM     Problem: Impaired respiratory status  Goal: Clear lung sounds  Outcome: Ongoing  Note: Lung sounds remain clear/diminished.      Problem: Serum Glucose Level - Abnormal:  Goal: Ability to maintain appropriate glucose levels will improve  Description: Ability to maintain appropriate glucose levels will improve  10/6/2020 1512 by Jessica Mead RN  Outcome: Ongoing  Note: Continue to check blood sugar and treat with sliding scale insulin. Care plan reviewed with patient and family per phone. Patient unable to verbalize - family verbalizes understanding of the plan of care and contribute to goal setting.

## 2020-10-06 NOTE — PLAN OF CARE
Problem: Impaired respiratory status  Goal: Patient will achieve/maintain normal respiratory rate/effort  10/6/2020 1547 by Lina Dutton RCP  Outcome: Ongoing  Note: Vent setting optimized to achieve target tidal volume, respiratory rate and ideal oxygen saturations. SBT will be performed when appropriate.

## 2020-10-06 NOTE — FLOWSHEET NOTE
10/06/20 0522   Provider Notification   Reason for Communication Review case   Provider Name Gillian Salcedo   Provider Notification Advance Practice Clinician (CNS, NP, CNM, CRNA, PA)   Method of Communication Secure Message   Response Waiting for response   Notification Time 0522   Notified of critical WBC value at this time, awaiting response.

## 2020-10-07 ENCOUNTER — APPOINTMENT (OUTPATIENT)
Dept: GENERAL RADIOLOGY | Age: 50
DRG: 870 | End: 2020-10-07
Payer: MEDICARE

## 2020-10-07 ENCOUNTER — APPOINTMENT (OUTPATIENT)
Dept: CT IMAGING | Age: 50
DRG: 870 | End: 2020-10-07
Payer: MEDICARE

## 2020-10-07 LAB
ABO: NORMAL
ALBUMIN SERPL-MCNC: 2.4 G/DL (ref 3.5–5.1)
ALP BLD-CCNC: 71 U/L (ref 38–126)
ALT SERPL-CCNC: 23 U/L (ref 11–66)
ANION GAP SERPL CALCULATED.3IONS-SCNC: 17 MEQ/L (ref 8–16)
ANTIBODY SCREEN: NORMAL
AST SERPL-CCNC: 21 U/L (ref 5–40)
BASOPHILS # BLD: 0.2 %
BASOPHILS ABSOLUTE: 0 THOU/MM3 (ref 0–0.1)
BILIRUB SERPL-MCNC: 0.5 MG/DL (ref 0.3–1.2)
BUN BLDV-MCNC: 35 MG/DL (ref 7–22)
CALCIUM SERPL-MCNC: 8.4 MG/DL (ref 8.5–10.5)
CHLORIDE BLD-SCNC: 99 MEQ/L (ref 98–111)
CO2: 21 MEQ/L (ref 23–33)
CREAT SERPL-MCNC: 4.1 MG/DL (ref 0.4–1.2)
EOSINOPHIL # BLD: 0.7 %
EOSINOPHILS ABSOLUTE: 0.2 THOU/MM3 (ref 0–0.4)
ERYTHROCYTE [DISTWIDTH] IN BLOOD BY AUTOMATED COUNT: 16.6 % (ref 11.5–14.5)
ERYTHROCYTE [DISTWIDTH] IN BLOOD BY AUTOMATED COUNT: 55.8 FL (ref 35–45)
GFR SERPL CREATININE-BSD FRML MDRD: 19 ML/MIN/1.73M2
GLUCOSE BLD-MCNC: 241 MG/DL (ref 70–108)
GLUCOSE BLD-MCNC: 277 MG/DL (ref 70–108)
GLUCOSE BLD-MCNC: 306 MG/DL (ref 70–108)
GLUCOSE BLD-MCNC: 310 MG/DL (ref 70–108)
HCT VFR BLD CALC: 23.2 % (ref 42–52)
HCT VFR BLD CALC: 27.4 % (ref 42–52)
HEMOGLOBIN: 7.1 GM/DL (ref 14–18)
HEMOGLOBIN: 8.6 GM/DL (ref 14–18)
IMMATURE GRANS (ABS): 0.17 THOU/MM3 (ref 0–0.07)
IMMATURE GRANULOCYTES: 0.8 %
LACTIC ACID: 0.9 MMOL/L (ref 0.5–2.2)
LYMPHOCYTES # BLD: 5.4 %
LYMPHOCYTES ABSOLUTE: 1.2 THOU/MM3 (ref 1–4.8)
MCH RBC QN AUTO: 28.3 PG (ref 26–33)
MCHC RBC AUTO-ENTMCNC: 30.6 GM/DL (ref 32.2–35.5)
MCV RBC AUTO: 92.4 FL (ref 80–94)
MONOCYTES # BLD: 7 %
MONOCYTES ABSOLUTE: 1.5 THOU/MM3 (ref 0.4–1.3)
NUCLEATED RED BLOOD CELLS: 0 /100 WBC
PLATELET # BLD: 330 THOU/MM3 (ref 130–400)
PMV BLD AUTO: 11.5 FL (ref 9.4–12.4)
POTASSIUM SERPL-SCNC: 2.9 MEQ/L (ref 3.5–5.2)
POTASSIUM SERPL-SCNC: 3.5 MEQ/L (ref 3.5–5.2)
PROCALCITONIN: 19.01 NG/ML (ref 0.01–0.09)
RBC # BLD: 2.51 MILL/MM3 (ref 4.7–6.1)
RH FACTOR: NORMAL
SEG NEUTROPHILS: 85.9 %
SEGMENTED NEUTROPHILS ABSOLUTE COUNT: 18.9 THOU/MM3 (ref 1.8–7.7)
SODIUM BLD-SCNC: 137 MEQ/L (ref 135–145)
TOTAL PROTEIN: 5.5 G/DL (ref 6.1–8)
VANCOMYCIN RANDOM: 32.7 UG/ML (ref 0.1–39.9)
WBC # BLD: 22 THOU/MM3 (ref 4.8–10.8)

## 2020-10-07 PROCEDURE — 94003 VENT MGMT INPAT SUBQ DAY: CPT

## 2020-10-07 PROCEDURE — 84132 ASSAY OF SERUM POTASSIUM: CPT

## 2020-10-07 PROCEDURE — 6370000000 HC RX 637 (ALT 250 FOR IP): Performed by: HOSPITALIST

## 2020-10-07 PROCEDURE — 83605 ASSAY OF LACTIC ACID: CPT

## 2020-10-07 PROCEDURE — 6360000002 HC RX W HCPCS: Performed by: NURSE PRACTITIONER

## 2020-10-07 PROCEDURE — 36415 COLL VENOUS BLD VENIPUNCTURE: CPT

## 2020-10-07 PROCEDURE — 2100000000 HC CCU R&B

## 2020-10-07 PROCEDURE — 94761 N-INVAS EAR/PLS OXIMETRY MLT: CPT

## 2020-10-07 PROCEDURE — P9016 RBC LEUKOCYTES REDUCED: HCPCS

## 2020-10-07 PROCEDURE — 99233 SBSQ HOSP IP/OBS HIGH 50: CPT | Performed by: INTERNAL MEDICINE

## 2020-10-07 PROCEDURE — 6370000000 HC RX 637 (ALT 250 FOR IP): Performed by: INTERNAL MEDICINE

## 2020-10-07 PROCEDURE — 85025 COMPLETE CBC W/AUTO DIFF WBC: CPT

## 2020-10-07 PROCEDURE — 2580000003 HC RX 258: Performed by: HOSPITALIST

## 2020-10-07 PROCEDURE — 86923 COMPATIBILITY TEST ELECTRIC: CPT

## 2020-10-07 PROCEDURE — 82948 REAGENT STRIP/BLOOD GLUCOSE: CPT

## 2020-10-07 PROCEDURE — 6360000002 HC RX W HCPCS: Performed by: INTERNAL MEDICINE

## 2020-10-07 PROCEDURE — 74176 CT ABD & PELVIS W/O CONTRAST: CPT

## 2020-10-07 PROCEDURE — 94640 AIRWAY INHALATION TREATMENT: CPT

## 2020-10-07 PROCEDURE — 80053 COMPREHEN METABOLIC PANEL: CPT

## 2020-10-07 PROCEDURE — 84145 PROCALCITONIN (PCT): CPT

## 2020-10-07 PROCEDURE — 99291 CRITICAL CARE FIRST HOUR: CPT | Performed by: INTERNAL MEDICINE

## 2020-10-07 PROCEDURE — 94770 HC ETCO2 MONITOR DAILY: CPT

## 2020-10-07 PROCEDURE — 2580000003 HC RX 258: Performed by: INTERNAL MEDICINE

## 2020-10-07 PROCEDURE — 86900 BLOOD TYPING SEROLOGIC ABO: CPT

## 2020-10-07 PROCEDURE — 86850 RBC ANTIBODY SCREEN: CPT

## 2020-10-07 PROCEDURE — 86901 BLOOD TYPING SEROLOGIC RH(D): CPT

## 2020-10-07 PROCEDURE — 2700000000 HC OXYGEN THERAPY PER DAY

## 2020-10-07 PROCEDURE — 85014 HEMATOCRIT: CPT

## 2020-10-07 PROCEDURE — 71045 X-RAY EXAM CHEST 1 VIEW: CPT

## 2020-10-07 PROCEDURE — 2500000003 HC RX 250 WO HCPCS: Performed by: INTERNAL MEDICINE

## 2020-10-07 PROCEDURE — 85018 HEMOGLOBIN: CPT

## 2020-10-07 PROCEDURE — 2580000003 HC RX 258: Performed by: NURSE PRACTITIONER

## 2020-10-07 RX ORDER — 0.9 % SODIUM CHLORIDE 0.9 %
20 INTRAVENOUS SOLUTION INTRAVENOUS ONCE
Status: COMPLETED | OUTPATIENT
Start: 2020-10-07 | End: 2020-10-07

## 2020-10-07 RX ORDER — CITALOPRAM 20 MG/1
10 TABLET ORAL DAILY
Status: DISCONTINUED | OUTPATIENT
Start: 2020-10-07 | End: 2020-10-14

## 2020-10-07 RX ORDER — POTASSIUM CHLORIDE 29.8 MG/ML
20 INJECTION INTRAVENOUS
Status: COMPLETED | OUTPATIENT
Start: 2020-10-07 | End: 2020-10-07

## 2020-10-07 RX ADMIN — PROPOFOL 30 MCG/KG/MIN: 10 INJECTION, EMULSION INTRAVENOUS at 21:59

## 2020-10-07 RX ADMIN — PIPERACILLIN AND TAZOBACTAM 3.38 G: 3; .375 INJECTION, POWDER, LYOPHILIZED, FOR SOLUTION INTRAVENOUS at 01:00

## 2020-10-07 RX ADMIN — Medication 15 ML: at 08:16

## 2020-10-07 RX ADMIN — PIPERACILLIN AND TAZOBACTAM 3.38 G: 3; .375 INJECTION, POWDER, LYOPHILIZED, FOR SOLUTION INTRAVENOUS at 18:37

## 2020-10-07 RX ADMIN — MIDODRINE HYDROCHLORIDE 5 MG: 5 TABLET ORAL at 18:35

## 2020-10-07 RX ADMIN — PROPOFOL 25 MCG/KG/MIN: 10 INJECTION, EMULSION INTRAVENOUS at 07:56

## 2020-10-07 RX ADMIN — GLYCOPYRROLATE AND FORMOTEROL FUMARATE 2 PUFF: 9; 4.8 AEROSOL, METERED RESPIRATORY (INHALATION) at 20:32

## 2020-10-07 RX ADMIN — ATORVASTATIN CALCIUM 40 MG: 40 TABLET, FILM COATED ORAL at 20:17

## 2020-10-07 RX ADMIN — SODIUM CHLORIDE, PRESERVATIVE FREE 10 ML: 5 INJECTION INTRAVENOUS at 20:17

## 2020-10-07 RX ADMIN — INSULIN LISPRO 2 UNITS: 100 INJECTION, SOLUTION INTRAVENOUS; SUBCUTANEOUS at 08:19

## 2020-10-07 RX ADMIN — MIDODRINE HYDROCHLORIDE 5 MG: 5 TABLET ORAL at 08:15

## 2020-10-07 RX ADMIN — Medication 15 ML: at 20:17

## 2020-10-07 RX ADMIN — GABAPENTIN 800 MG: 400 CAPSULE ORAL at 08:16

## 2020-10-07 RX ADMIN — GLYCOPYRROLATE AND FORMOTEROL FUMARATE 2 PUFF: 9; 4.8 AEROSOL, METERED RESPIRATORY (INHALATION) at 09:30

## 2020-10-07 RX ADMIN — Medication 2000 UNITS: at 08:15

## 2020-10-07 RX ADMIN — INSULIN LISPRO 6 UNITS: 100 INJECTION, SOLUTION INTRAVENOUS; SUBCUTANEOUS at 18:31

## 2020-10-07 RX ADMIN — PROPOFOL 25 MCG/KG/MIN: 10 INJECTION, EMULSION INTRAVENOUS at 13:49

## 2020-10-07 RX ADMIN — POTASSIUM CHLORIDE 20 MEQ: 400 INJECTION, SOLUTION INTRAVENOUS at 06:56

## 2020-10-07 RX ADMIN — FOLIC ACID 1 MG: 1 TABLET ORAL at 08:16

## 2020-10-07 RX ADMIN — ASPIRIN 81 MG: 81 TABLET, CHEWABLE ORAL at 08:16

## 2020-10-07 RX ADMIN — DOXYCYCLINE 100 MG: 100 INJECTION, POWDER, LYOPHILIZED, FOR SOLUTION INTRAVENOUS at 10:34

## 2020-10-07 RX ADMIN — PANTOPRAZOLE SODIUM 40 MG: 40 TABLET, DELAYED RELEASE ORAL at 08:15

## 2020-10-07 RX ADMIN — MIDODRINE HYDROCHLORIDE 5 MG: 5 TABLET ORAL at 12:15

## 2020-10-07 RX ADMIN — PIPERACILLIN AND TAZOBACTAM 3.38 G: 3; .375 INJECTION, POWDER, LYOPHILIZED, FOR SOLUTION INTRAVENOUS at 08:36

## 2020-10-07 RX ADMIN — GABAPENTIN 800 MG: 400 CAPSULE ORAL at 20:17

## 2020-10-07 RX ADMIN — POTASSIUM CHLORIDE 20 MEQ: 29.8 INJECTION, SOLUTION INTRAVENOUS at 07:58

## 2020-10-07 RX ADMIN — INSULIN GLARGINE 30 UNITS: 100 INJECTION, SOLUTION SUBCUTANEOUS at 20:51

## 2020-10-07 RX ADMIN — CITALOPRAM 10 MG: 20 TABLET, FILM COATED ORAL at 08:16

## 2020-10-07 RX ADMIN — ACETAMINOPHEN 650 MG: 325 TABLET ORAL at 00:59

## 2020-10-07 RX ADMIN — SODIUM CHLORIDE: 9 INJECTION, SOLUTION INTRAVENOUS at 07:44

## 2020-10-07 RX ADMIN — THERA TABS 1 TABLET: TAB at 08:15

## 2020-10-07 RX ADMIN — SODIUM CHLORIDE: 9 INJECTION, SOLUTION INTRAVENOUS at 00:22

## 2020-10-07 RX ADMIN — POTASSIUM CHLORIDE 20 MEQ: 29.8 INJECTION, SOLUTION INTRAVENOUS at 08:35

## 2020-10-07 RX ADMIN — DOXYCYCLINE 100 MG: 100 INJECTION, POWDER, LYOPHILIZED, FOR SOLUTION INTRAVENOUS at 20:18

## 2020-10-07 RX ADMIN — SODIUM CHLORIDE, PRESERVATIVE FREE 10 ML: 5 INJECTION INTRAVENOUS at 08:17

## 2020-10-07 RX ADMIN — ARIPIPRAZOLE 15 MG: 15 TABLET ORAL at 08:16

## 2020-10-07 RX ADMIN — INSULIN LISPRO 4 UNITS: 100 INJECTION, SOLUTION INTRAVENOUS; SUBCUTANEOUS at 12:22

## 2020-10-07 RX ADMIN — POTASSIUM CHLORIDE 20 MEQ: 29.8 INJECTION, SOLUTION INTRAVENOUS at 12:11

## 2020-10-07 RX ADMIN — ENOXAPARIN SODIUM 30 MG: 30 INJECTION SUBCUTANEOUS at 13:39

## 2020-10-07 RX ADMIN — INSULIN LISPRO 4 UNITS: 100 INJECTION, SOLUTION INTRAVENOUS; SUBCUTANEOUS at 20:51

## 2020-10-07 RX ADMIN — ALLOPURINOL 200 MG: 100 TABLET ORAL at 08:16

## 2020-10-07 RX ADMIN — PROPOFOL 25 MCG/KG/MIN: 10 INJECTION, EMULSION INTRAVENOUS at 03:24

## 2020-10-07 RX ADMIN — SODIUM CHLORIDE: 9 INJECTION, SOLUTION INTRAVENOUS at 10:34

## 2020-10-07 ASSESSMENT — PULMONARY FUNCTION TESTS
PIF_VALUE: 27
PIF_VALUE: 25
PIF_VALUE: 30

## 2020-10-07 ASSESSMENT — PAIN SCALES - GENERAL
PAINLEVEL_OUTOF10: 0
PAINLEVEL_OUTOF10: 0

## 2020-10-07 NOTE — FLOWSHEET NOTE
10/07/20 7607   Provider Notification   Reason for Communication Evaluate   Provider Name Robin   Provider Notification Physician   Method of Communication Secure Message   Response Waiting for response   Notification Time [phone]: 4B06: Patient cr 4.1 this AM from 3.3. Not a significant amount of urine output for the amount of IV fluids he is receiving. 0.9 running at 150ml/hr, levo, Propofol, and IV ATB. Renal US showed unremarkable kidneys. Just wanted to update.

## 2020-10-07 NOTE — PLAN OF CARE
Problem: Nutrition  Goal: Optimal nutrition therapy  10/7/2020 1149 by Jasen Ahumada RD, LD  Outcome: Ongoing   Nutrition Problem #1: Inadequate oral intake  Intervention: Food and/or Nutrient Delivery: Continue NPO, Modify Tube Feeding  Nutritional Goals: Patient will tolerate EN adequate for active late phase covid infection

## 2020-10-07 NOTE — PROGRESS NOTES
Patient:  Holger Aguilar                    Unit/Bed:4B-04/004-A  FCQ: [id number]            Bonnie Camejo MD  Date of Admission: 9/23/2020     Assessment and Plan(All pulmonary edema, renal failure, PE, and respiratory failure diagnoses are acute in nature unless otherwise specified):        1. Acute hypoxemic respiratory failure: Patient extubated 10/2/2020.  On low-flow oxygen. Although patient had ongoing radiographic improvement, he had progressive lethargy and subsequent obtundation. Patient intubated 10/6/2020 for hypercarbic hypoxemic respiratory failure. Continue with lung protection strategies targeting a peak pressure 35 or less and a plateau pressure of 30 or less. .  2. Acute lung injury: Oxygenating well. .  3. Acute renal failure: Patient has no IV contrast exposure. Acute deterioration 10/6/2020. Renal ultrasound was unimpressive. Fractional excretion of sodium was less than 1 and was consistent with volume contraction. Patient had hemodilution with volume resuscitation with a drop in hemoglobin of 2 g. This also goes along with volume contraction. Pressor requirements have significantly diminished as patient volume resuscitated. Appreciate nephrology's assistance. Vasculitis screen pending. Urine output has improved with volume resuscitation. 4. Sepsis: Secondary to pneumonia and COVID-19.  Associated with tachypnea and pneumonia, initially. Pneumonia was secondary to MRSA and was treated with vancomycin. As fever developed on 10/5/2020, vancomycin was discontinued and doxycycline was initiated. Antibiotics also expanded to include Zosyn. Both doxycycline and Zosyn were initiated on 10/6/2020. Urinalysis showed greater than 200 white blood cells. I suspect new onset fever is related to urinary tract infection. Sputum PCR shows persistent MRSA, which may represent colonization. Blood cultures are pending.   5. Hypotension: Status post pressors and volume resuscitation. Screen for adrenal insufficiency was negative.  Continues to require pressors. Etiology is secondary to volume contraction and sepsis. Multifactorial.  Continue with volume resuscitation. As patient undergoes volume resuscitation, pressor requirements are diminishing. Suspect this is primarily volume contraction and not septic shock. 6. Pneumonia: Secondary to MRSA. Status post 8 days of vancomycin. Patient developed new onset fever while on vancomycin. Vancomycin was subsequently discontinued and doxycycline was initiated. Radiographically, there is not much change. As fever developed while on vancomycin, vancomycin was discontinued. PCR of pulmonary lavage still showed MRSA within the sputum. This may represent contamination. However, patient will continue with doxycycline for a total of 10 days. Day #2/10 doxycycline. 7. Anemia: 2 g hemoglobin loss. Doubt represents acute bleed. Suspect delusional.  Obtain CT scan abdomen and pelvis to exclude retroperitoneal hemorrhage. Transfuse 2 units packed red blood cells to improve oxygen delivery and intravascular volume. .  8. Type 2 diabetes mellitus: Subcutaneous insulin. 9. Diastolic heart failure: On calcium channel blocker and diuretic.  Amlodipine on hold secondary to hypotension. 10. Obstructive sleep apnea: Secondary morbid obesity.  CPAP/BiPAP noncompliant. 11. Hyperlipidemia: On atorvastatin. 12. Hypertension:  Currently hypotensive.  Amlodipine discontinued. 13. Gout: On allopurinol. 14. COVID-19: Convalescent plasma received 9/24/2020.   Resolved.     CC: Respiratory failure  HPI: Patient is a 49-year-old morbidly obese black male lifetime non-smoker. Reggie Casillas has a history of morbid obesity associated with type 2 diabetes mellitus, prior alcohol abuse, hyperlipidemia, hypertension, hypogonadism, nonalcoholic steatohepatitis, obstructive sleep apnea, and gout.  Patient was hospitalized 9/6/2020 through 9/15/2020 with hypoxemic respiratory failure secondary to COVID-19 associated with diffuse bilateral infiltrates.  At that time, patient received Decadron, remdesivir, and danazol.  During hospitalization, he had issues with atrial fibrillation.  His insulin therapy required adjustment.  He was discharged home on subcutaneous Lovenox. Patient returned back to the emergency room on 9/23/2020 with increasing SOB and progressive hypoxia. CXR showed diffuse infiltrates.  Deteriorated and required intubation. Patient underwent bronchoscopy on 9/27/2020 which demonstrated no mucus production. Patient extubated 10/2/2020. Patient reintubated for progressive respiratory failure with progressive obtundation on 10/6/2020. This was associated with acute oliguric renal failure. Patient was intubated 10/6/2020, and underwent volume resuscitation. Fractional excretion of sodium returned less than 1 which was consistent with prerenal azotemia. After volume resuscitation, patient demonstrated that he was hemoconcentrated with a drop of 2 g in the hemoglobin. With volume resuscitation, urine output did improve. After patient was intubated on 10/6/2020, he underwent pulmonary lavage which showed MRSA on the PCR but no other organism. However, patient was found to have greater than 200 white blood cells in the urine. Patient was treated with Zosyn and doxycycline. Previously, patient had received vancomycin and developed fever while on vancomycin. Therefore vancomycin was not continued. For further details, please review the assessment and plan. ROS:  Patient  sedated to comfort on mechanical ventilator. PMH:  Per HPI  SHX: Lifetime non-smoker  FHX: Positive for heart disease.   Allergies: PCN  Medications:     propofol 25 mcg/kg/min (10/07/20 0324)    sodium chloride 150 mL/hr at 10/07/20 0022    norepinephrine 7 mcg/min (10/06/20 2005)    dextrose        sodium chloride  20 mL Intravenous Once    enoxaparin  30 mg Subcutaneous Daily    citalopram  10 mg Oral Daily    potassium chloride  60 mEq Intravenous Once    doxycycline (VIBRAMYCIN) IV  100 mg Intravenous Q12H    chlorhexidine  15 mL Mouth/Throat BID    insulin glargine  30 Units Subcutaneous Nightly    midodrine  5 mg Oral TID WC    lidocaine 1 % injection  5 mL Intradermal Once    sodium chloride flush  10 mL Intravenous 2 times per day    piperacillin-tazobactam  3.375 g Intravenous Q8H    multivitamin  1 tablet Oral Daily    pantoprazole  40 mg Oral QAM AC    insulin lispro  0-6 Units Subcutaneous TID WC    insulin lispro  0-3 Units Subcutaneous Nightly    magnesium replacement protocol   Other RX Placeholder    phosphorus replacement protocol   Other RX Placeholder    calcium replacement protocol   Other RX Placeholder    polyethylene glycol  17 g Oral Every Other Day    allopurinol  200 mg Oral Daily    ARIPiprazole  15 mg Oral Daily    aspirin  81 mg Oral Daily    atorvastatin  40 mg Oral Nightly    Vitamin D  2,000 Units Oral Daily    folic acid  1 mg Oral Daily    gabapentin  800 mg Oral BID    sodium chloride flush  10 mL Intravenous 2 times per day    glycopyrrolate-formoterol  2 puff Inhalation BID    senna  1 tablet Oral BID     Vital Signs:   T: 99.7: P: 69: RR: 24: B/P: 102/61: FiO2: 30: O2 Sat: 100: I/O: 3497/845  GCS:  8: T-max 100.9. Body mass index is 43.04 kg/m². Carlos Alberto Hilt General:   Morbidly obese black male. HEENT:  normocephalic and atraumatic.  No scleral icterus. PERR  Neck: supple.  No Thyromegaly. Lungs: Clear to anterior auscultation. Respirations unlabored. Cardiac: RRR.  No JVD. Abdomen: soft.  Nontender.  Large panniculus. Extremities:  No clubbing, cyanosis x 4. No lower extremity edema bilaterally. Vasculature: capillary refill < 3 seconds. Palpable dorsalis pedis pulses. Skin:  warm and dry. Psych:    Sedated on mechanical ventilator. Carlos Alberto Hilt Lymph:  No supraclavicular adenopathy. Neurologic:  No focal deficit.  No seizures.     Data: (All radiographs, tracings, PFTs, and imaging are personally viewed and interpreted unless otherwise noted). · Telemetry shows sinus rhythm. · Echocardiogram shows an ejection fraction of 60%. .  · Sputum collected 9/23/2020: Positive for MRSA. · Sputum PCR collected 10/6/2020 is positive for MRSA. · Sodium 137, potassium 2.9, chloride 99, bicarb 21, BUN 35, creatinine 4.1, glucose 241. Procalcitonin 19.01. Anion gap 17. Albumin 2.4. White blood cell count 22, hemoglobin 7.1, platelets 900. · Chest x-ray demonstrates right middle lobe infiltrate and patchy bilateral infiltrates. · Fractional excretion of sodium is 0.3. · Renal ultrasound report 10/6/2020 shows normal kidneys. .     CC time 35 minutes. Time was discontiguous. Electronically signed by Neo Garza M.D.

## 2020-10-07 NOTE — PROGRESS NOTES
[address] [id number]  PHYSICAL THERAPY MISSED TREATMENT NOTE  ACUTE CARE  [facility] 4B              Missed Treatment  Pt on vent. Reorder when able to tolerate therapy.

## 2020-10-07 NOTE — FLOWSHEET NOTE
10/07/20 0710   Provider Notification   Reason for Communication Evaluate   Provider Name Chuy Naidu   Provider Notification Physician   Method of Communication Secure Message   Response Waiting for response   Notification Time 0710     0710: 4B06: Hgb dropped to 7.1 from 9.7 in one day with no external signs of active bleeding. Procal 19.01 this Am. Just wanted to see if you wanted to give blood at 7.1 or not?

## 2020-10-07 NOTE — PROGRESS NOTES
[facility]         Renal Inpatient Follow-Up note         10/7/2020 7:51 AM    Pt Name:   Matilde Severe  YOB: 1970  Attending:   Stephan Davila MD    Chief Complaint : Matilde Severe is a 44 y.o. male being followed by nephrology for acute kidney injury    Interval History :   Patient seen and examined by me. No distress  Intubated cannot accurately assess history or review of systems  Patient is only on 30% FiO2 but higher PEEP  Not making much urine output but better than prior.   Around 445 mm     Scheduled Medications :    sodium chloride  20 mL Intravenous Once    enoxaparin  30 mg Subcutaneous Daily    citalopram  10 mg Oral Daily    potassium chloride  60 mEq Intravenous Once    doxycycline (VIBRAMYCIN) IV  100 mg Intravenous Q12H    chlorhexidine  15 mL Mouth/Throat BID    insulin glargine  30 Units Subcutaneous Nightly    midodrine  5 mg Oral TID WC    lidocaine 1 % injection  5 mL Intradermal Once    sodium chloride flush  10 mL Intravenous 2 times per day    piperacillin-tazobactam  3.375 g Intravenous Q8H    multivitamin  1 tablet Oral Daily    pantoprazole  40 mg Oral QAM AC    insulin lispro  0-6 Units Subcutaneous TID WC    insulin lispro  0-3 Units Subcutaneous Nightly    magnesium replacement protocol   Other RX Placeholder    phosphorus replacement protocol   Other RX Placeholder    calcium replacement protocol   Other RX Placeholder    polyethylene glycol  17 g Oral Every Other Day    allopurinol  200 mg Oral Daily    ARIPiprazole  15 mg Oral Daily    aspirin  81 mg Oral Daily    atorvastatin  40 mg Oral Nightly    Vitamin D  2,000 Units Oral Daily    folic acid  1 mg Oral Daily    gabapentin  800 mg Oral BID    sodium chloride flush  10 mL Intravenous 2 times per day    glycopyrrolate-formoterol  2 puff Inhalation BID    senna  1 tablet Oral BID      propofol 25 mcg/kg/min (10/07/20 0324)    sodium chloride 150 mL/hr at 10/07/20 0744    norepinephrine 7 mcg/min (10/06/20 2005)    dextrose         Vitals :  /64   Pulse 66   Temp 99.3 °F (37.4 °C) (Bladder)   Resp 20   Ht 5' 8\" (1.727 m)   Wt 283 lb 1.1 oz (128.4 kg)   SpO2 97%   BMI 43.04 kg/m²     24HR INTAKE/OUTPUT:      Intake/Output Summary (Last 24 hours) at 10/7/2020 0751  Last data filed at 10/7/2020 0330  Gross per 24 hour   Intake 3497 ml   Output 845 ml   Net 2652 ml     Last 3 weights  Wt Readings from Last 3 Encounters:   10/06/20 283 lb 1.1 oz (128.4 kg)   09/15/20 281 lb 6.4 oz (127.6 kg)   08/21/20 (!) 306 lb 6.4 oz (139 kg)           Physical Exam : Due to COVID-19 situation termination is limited exam from outside the room  General Appearance: Obese well-nourished no distress  Mouth/Throat: ET tube in place  CNS not responsive to verbal commands  Psych-not agitated  Abdomen: Appears slightly distended  Musculoskeletal:  Edema -none as per RN         Last 3 CBC   Recent Labs     10/05/20  1625 10/06/20  0456 10/07/20  0531   WBC 29.8* 37.4* 22.0*   RBC 3.83* 3.41* 2.51*   HGB 11.2* 9.7* 7.1*   HCT 36.3* 31.5* 23.2*   * 407* 330     Last 3 CMP  Recent Labs     10/05/20  1625 10/06/20  0456 10/07/20  0531    135 137   K 4.2 3.4* 2.9*   CL 98 94* 99   CO2 25 26 21*   BUN 15 28* 35*   CREATININE 1.0 3.3* 4.1*   CALCIUM 9.7 9.6 8.4*   LABALBU 2.6* 3.1* 2.4*   BILITOT 0.8 1.1 0.5             ASSESSMENT / Plan   1 Renal -acute kidney injury most likely due to septic ATN/hypotension  ? Does have slightly elevated vancomycin level but I doubt this has any impact on the renal function  ? Continue IV hydration urine output improving slightly  ? Decrease IV fluids 200 mL/h at noon. ? If respiratory status worsens renal replacement therapy may be needed    2 Electrolytes -hypokalemia-will be repleted, probably due to hydration  3 Mild acidosis  4 Anemia trending down again no signs of bleeding. Mostly due to IV hydration.   Follow for now  5 Hypotension mostly due to sepsis/volume depletion continue IV hydration and wean pressors to map greater than 65  6 Hx of diabetes mellitus  7 Hx of COVID-19 pneumonia  8 Ventilator dependent respiratory failure  9 Hx of diastolic dysfunction volume status reasonable closely follow  10 Meds reviewed and discussed with nursing staff    EMELY Hawkins D.  [facility].

## 2020-10-07 NOTE — PROGRESS NOTES
[address] [address] 58  OCCUPATIONAL THERAPY MISSED TREATMENT NOTE  [facility] 4B  4B-06/006-A      Date: 10/7/2020  Patient Name: Darice Carrel        CSN: [id number]   : 1970  (44 y.o.)  Gender: male   Referring Practitioner: BHUPINDER Koo  Diagnosis: Acute Respiratory Failure with Hypoxemia         REASON FOR MISSED TREATMENT: Pt on vent. Reorder when able to tolerate therapy.

## 2020-10-07 NOTE — PROGRESS NOTES
Comprehensive Nutrition Assessment    Type and Reason for Visit:  Reassess(TF ordering and management)    Nutrition Recommendations/Plan:   Increase Vital 1.2 to 20 ml/hr  Water flushes per MD  Continue multivitamin  Spoke with CNP - plan to start culturelle for probiotic benefits and hold senna - has flexiseal currently  Monitoring tolerance, diprivan and renal status for EN adjustments as appropriate. Nutrition Assessment:    Pt. Nutritionally compromised AEB NPO, intubated, renal deterioration. Remains at risk for further nutritional compromise r/t admitted with acute respiratory failure with hypoxemia, re-intubated 10/6, convalescent plasma 9/24, obesity, elevated Hbg A1C of 9.9,  and underlying medical condition (hx: CAD, DM, GERD, Alcohol abuse, HLD, HTN, Vitamin D deficiency). Nutrition recommendations/interventions as per abo    Malnutrition Assessment:  Malnutrition Status:  Insufficient data(+covid)    Context:  Acute Illness     Findings of the 6 clinical characteristics of malnutrition:  Energy Intake:  Mild decrease in energy intake (Comment)(good appetite or on EN at goal since admit)  Weight Loss:  (16# or 5% in 2 months)     Body Fat Loss:  Unable to assess(+covid patient)     Muscle Mass Loss:  Unable to assess(+covid)    Fluid Accumulation:  1 - Mild Extremities   Strength:  Not Performed    Estimated Daily Nutrient Needs:  Energy (kcal):  1218-8334 (30-32 kcals/kg IBW in active late phase);  Weight Used for Energy Requirements:  Ideal(70 kg - ideal weight)     Protein (g):  ~140 gms (2/kgm IBW) as renal status allows; Weight Used for Protein Requirements:  Ideal(70 kg)        Fluid (ml/day):  per MD; Weight Used for Fluid Requirements:  (n/a)      Nutrition Related Findings:  covid; intubated 9/23 - extubated 10/2 and reintubated 10/6 d/t hypercarbic hypoxemia respiratory failure; tolerating EN at 10ml/hour so increased to 20ml/hour this am; renal deterioration 10/6 so holding on Outcomes:  (n/a)   Food/Nutrient Intake Outcomes:  Enteral Nutrition Intake/Tolerance  Physical Signs/Symptoms Outcomes:  Biochemical Data, GI Status, Fluid Status or Edema, Hemodynamic Status, Skin, Weight     Discharge Planning:     Too soon to determine     Electronically signed by Shellie Martinez RD, LD on 10/7/20 at 11:50 AM EDT    Contact: [phone]

## 2020-10-07 NOTE — PLAN OF CARE
Problem: Falls - Risk of:  Goal: Will remain free from falls  Description: Will remain free from falls  Outcome: Met This Shift  Note: No falls this shift. Pt remains sedated and intubated. Follows commands. Bed in lowest position with side rails up X 2 and call light within reach. Problem: Falls - Risk of:  Goal: Absence of physical injury  Outcome: Met This Shift  Note: No evidence of physical injury. Problem: Skin Integrity:  Goal: Will show no infection signs and symptoms  Description: Will show no infection signs and symptoms  Outcome: Met This Shift  Note: No signs of infection. Problem: Nutrition  Goal: Optimal nutrition therapy  10/7/2020 1856 by Reg Beck RN  Note: Continues on vital 1.2 martina @ goal rate of 20 ml/hr. Problem: Serum Glucose Level - Abnormal:  Goal: Ability to maintain appropriate glucose levels will improve  Description: Ability to maintain appropriate glucose levels will improve  Outcome: Not Met This Shift  Note: SS increased to medium dose for uncontrolled levels.

## 2020-10-08 ENCOUNTER — APPOINTMENT (OUTPATIENT)
Dept: GENERAL RADIOLOGY | Age: 50
DRG: 870 | End: 2020-10-08
Payer: MEDICARE

## 2020-10-08 LAB
ALBUMIN SERPL-MCNC: 2.6 G/DL (ref 3.5–5.1)
ALP BLD-CCNC: 91 U/L (ref 38–126)
ALT SERPL-CCNC: 24 U/L (ref 11–66)
ANION GAP SERPL CALCULATED.3IONS-SCNC: 16 MEQ/L (ref 8–16)
AST SERPL-CCNC: 20 U/L (ref 5–40)
BASOPHILS # BLD: 0.3 %
BASOPHILS ABSOLUTE: 0.1 THOU/MM3 (ref 0–0.1)
BILIRUB SERPL-MCNC: 0.4 MG/DL (ref 0.3–1.2)
BUN BLDV-MCNC: 38 MG/DL (ref 7–22)
CALCIUM SERPL-MCNC: 8.7 MG/DL (ref 8.5–10.5)
CARDIOLIPIN AB IGM: 10 MPL (ref 0–12)
CARDIOLIPIN ANTIBODY, IGG: 6 GPL (ref 0–14)
CHLORIDE BLD-SCNC: 102 MEQ/L (ref 98–111)
CO2: 19 MEQ/L (ref 23–33)
CREAT SERPL-MCNC: 3.5 MG/DL (ref 0.4–1.2)
EOSINOPHIL # BLD: 1.5 %
EOSINOPHILS ABSOLUTE: 0.3 THOU/MM3 (ref 0–0.4)
ERYTHROCYTE [DISTWIDTH] IN BLOOD BY AUTOMATED COUNT: 16.2 % (ref 11.5–14.5)
ERYTHROCYTE [DISTWIDTH] IN BLOOD BY AUTOMATED COUNT: 54.6 FL (ref 35–45)
GFR SERPL CREATININE-BSD FRML MDRD: 22 ML/MIN/1.73M2
GLUCOSE BLD-MCNC: 250 MG/DL (ref 70–108)
GLUCOSE BLD-MCNC: 268 MG/DL (ref 70–108)
GLUCOSE BLD-MCNC: 287 MG/DL (ref 70–108)
GLUCOSE BLD-MCNC: 302 MG/DL (ref 70–108)
GLUCOSE BLD-MCNC: 306 MG/DL (ref 70–108)
GRAM STAIN RESULT: ABNORMAL
HCT VFR BLD CALC: 26.4 % (ref 42–52)
HEMOGLOBIN: 8.4 GM/DL (ref 14–18)
IMMATURE GRANS (ABS): 0.12 THOU/MM3 (ref 0–0.07)
IMMATURE GRANULOCYTES: 0.7 %
LACTIC ACID: 1.1 MMOL/L (ref 0.5–2.2)
LYMPHOCYTES # BLD: 5.6 %
LYMPHOCYTES ABSOLUTE: 0.9 THOU/MM3 (ref 1–4.8)
MCH RBC QN AUTO: 29 PG (ref 26–33)
MCHC RBC AUTO-ENTMCNC: 31.8 GM/DL (ref 32.2–35.5)
MCV RBC AUTO: 91 FL (ref 80–94)
MONOCYTES # BLD: 5.2 %
MONOCYTES ABSOLUTE: 0.9 THOU/MM3 (ref 0.4–1.3)
MYOGLOBIN URINE: < 1 MG/L (ref 0–1)
NEUTROPHIL CYTOPLASMIC AB IGG: NORMAL
NUCLEATED RED BLOOD CELLS: 0 /100 WBC
ORGANISM: ABNORMAL
PLATELET # BLD: 339 THOU/MM3 (ref 130–400)
PMV BLD AUTO: 11.4 FL (ref 9.4–12.4)
POTASSIUM SERPL-SCNC: 3.1 MEQ/L (ref 3.5–5.2)
PROCALCITONIN: 10.69 NG/ML (ref 0.01–0.09)
RBC # BLD: 2.9 MILL/MM3 (ref 4.7–6.1)
RESPIRATORY CULTURE: ABNORMAL
RESPIRATORY CULTURE: ABNORMAL
SEG NEUTROPHILS: 86.7 %
SEGMENTED NEUTROPHILS ABSOLUTE COUNT: 14.5 THOU/MM3 (ref 1.8–7.7)
SODIUM BLD-SCNC: 137 MEQ/L (ref 135–145)
TOTAL PROTEIN: 6.1 G/DL (ref 6.1–8)
WBC # BLD: 16.7 THOU/MM3 (ref 4.8–10.8)

## 2020-10-08 PROCEDURE — 83605 ASSAY OF LACTIC ACID: CPT

## 2020-10-08 PROCEDURE — 94640 AIRWAY INHALATION TREATMENT: CPT

## 2020-10-08 PROCEDURE — 94770 HC ETCO2 MONITOR DAILY: CPT

## 2020-10-08 PROCEDURE — 94003 VENT MGMT INPAT SUBQ DAY: CPT

## 2020-10-08 PROCEDURE — 71045 X-RAY EXAM CHEST 1 VIEW: CPT

## 2020-10-08 PROCEDURE — 6360000002 HC RX W HCPCS: Performed by: NURSE PRACTITIONER

## 2020-10-08 PROCEDURE — 6370000000 HC RX 637 (ALT 250 FOR IP): Performed by: INTERNAL MEDICINE

## 2020-10-08 PROCEDURE — 85025 COMPLETE CBC W/AUTO DIFF WBC: CPT

## 2020-10-08 PROCEDURE — 2580000003 HC RX 258: Performed by: INTERNAL MEDICINE

## 2020-10-08 PROCEDURE — 80053 COMPREHEN METABOLIC PANEL: CPT

## 2020-10-08 PROCEDURE — 2500000003 HC RX 250 WO HCPCS: Performed by: INTERNAL MEDICINE

## 2020-10-08 PROCEDURE — 6360000002 HC RX W HCPCS: Performed by: INTERNAL MEDICINE

## 2020-10-08 PROCEDURE — 36415 COLL VENOUS BLD VENIPUNCTURE: CPT

## 2020-10-08 PROCEDURE — 99291 CRITICAL CARE FIRST HOUR: CPT | Performed by: INTERNAL MEDICINE

## 2020-10-08 PROCEDURE — 94761 N-INVAS EAR/PLS OXIMETRY MLT: CPT

## 2020-10-08 PROCEDURE — 82948 REAGENT STRIP/BLOOD GLUCOSE: CPT

## 2020-10-08 PROCEDURE — 2580000003 HC RX 258: Performed by: NURSE PRACTITIONER

## 2020-10-08 PROCEDURE — 6370000000 HC RX 637 (ALT 250 FOR IP): Performed by: HOSPITALIST

## 2020-10-08 PROCEDURE — 99233 SBSQ HOSP IP/OBS HIGH 50: CPT | Performed by: INTERNAL MEDICINE

## 2020-10-08 PROCEDURE — 84145 PROCALCITONIN (PCT): CPT

## 2020-10-08 PROCEDURE — 2100000000 HC CCU R&B

## 2020-10-08 RX ORDER — INSULIN GLARGINE 100 [IU]/ML
38 INJECTION, SOLUTION SUBCUTANEOUS NIGHTLY
Status: DISCONTINUED | OUTPATIENT
Start: 2020-10-08 | End: 2020-11-06 | Stop reason: HOSPADM

## 2020-10-08 RX ORDER — SODIUM CHLORIDE AND POTASSIUM CHLORIDE .9; .15 G/100ML; G/100ML
SOLUTION INTRAVENOUS CONTINUOUS
Status: DISCONTINUED | OUTPATIENT
Start: 2020-10-08 | End: 2020-10-10

## 2020-10-08 RX ADMIN — PANTOPRAZOLE SODIUM 40 MG: 40 TABLET, DELAYED RELEASE ORAL at 08:14

## 2020-10-08 RX ADMIN — DOXYCYCLINE 100 MG: 100 INJECTION, POWDER, LYOPHILIZED, FOR SOLUTION INTRAVENOUS at 23:06

## 2020-10-08 RX ADMIN — FOLIC ACID 1 MG: 1 TABLET ORAL at 08:14

## 2020-10-08 RX ADMIN — MIDODRINE HYDROCHLORIDE 5 MG: 5 TABLET ORAL at 17:22

## 2020-10-08 RX ADMIN — Medication 6 MCG/MIN: at 03:01

## 2020-10-08 RX ADMIN — ACETAMINOPHEN 650 MG: 325 TABLET ORAL at 00:34

## 2020-10-08 RX ADMIN — GLYCOPYRROLATE AND FORMOTEROL FUMARATE 2 PUFF: 9; 4.8 AEROSOL, METERED RESPIRATORY (INHALATION) at 22:52

## 2020-10-08 RX ADMIN — PROPOFOL 30 MCG/KG/MIN: 10 INJECTION, EMULSION INTRAVENOUS at 23:59

## 2020-10-08 RX ADMIN — DOXYCYCLINE 100 MG: 100 INJECTION, POWDER, LYOPHILIZED, FOR SOLUTION INTRAVENOUS at 08:44

## 2020-10-08 RX ADMIN — MIDODRINE HYDROCHLORIDE 5 MG: 5 TABLET ORAL at 11:19

## 2020-10-08 RX ADMIN — ASPIRIN 81 MG: 81 TABLET, CHEWABLE ORAL at 08:14

## 2020-10-08 RX ADMIN — Medication 2000 UNITS: at 08:14

## 2020-10-08 RX ADMIN — PIPERACILLIN AND TAZOBACTAM 3.38 G: 3; .375 INJECTION, POWDER, LYOPHILIZED, FOR SOLUTION INTRAVENOUS at 03:01

## 2020-10-08 RX ADMIN — Medication 15 ML: at 20:46

## 2020-10-08 RX ADMIN — GLYCOPYRROLATE AND FORMOTEROL FUMARATE 2 PUFF: 9; 4.8 AEROSOL, METERED RESPIRATORY (INHALATION) at 10:26

## 2020-10-08 RX ADMIN — MIDODRINE HYDROCHLORIDE 5 MG: 5 TABLET ORAL at 08:14

## 2020-10-08 RX ADMIN — INSULIN LISPRO 3 UNITS: 100 INJECTION, SOLUTION INTRAVENOUS; SUBCUTANEOUS at 20:36

## 2020-10-08 RX ADMIN — THERA TABS 1 TABLET: TAB at 08:14

## 2020-10-08 RX ADMIN — POTASSIUM CHLORIDE AND SODIUM CHLORIDE: 900; 150 INJECTION, SOLUTION INTRAVENOUS at 17:22

## 2020-10-08 RX ADMIN — INSULIN GLARGINE 38 UNITS: 100 INJECTION, SOLUTION SUBCUTANEOUS at 20:37

## 2020-10-08 RX ADMIN — GABAPENTIN 800 MG: 400 CAPSULE ORAL at 08:14

## 2020-10-08 RX ADMIN — ALLOPURINOL 200 MG: 100 TABLET ORAL at 08:14

## 2020-10-08 RX ADMIN — PROPOFOL 30 MCG/KG/MIN: 10 INJECTION, EMULSION INTRAVENOUS at 05:42

## 2020-10-08 RX ADMIN — CITALOPRAM 10 MG: 20 TABLET, FILM COATED ORAL at 08:14

## 2020-10-08 RX ADMIN — POTASSIUM CHLORIDE 20 MEQ: 400 INJECTION, SOLUTION INTRAVENOUS at 09:37

## 2020-10-08 RX ADMIN — POTASSIUM CHLORIDE 20 MEQ: 400 INJECTION, SOLUTION INTRAVENOUS at 08:14

## 2020-10-08 RX ADMIN — ENOXAPARIN SODIUM 30 MG: 30 INJECTION SUBCUTANEOUS at 08:43

## 2020-10-08 RX ADMIN — INSULIN LISPRO 6 UNITS: 100 INJECTION, SOLUTION INTRAVENOUS; SUBCUTANEOUS at 08:18

## 2020-10-08 RX ADMIN — Medication 15 ML: at 08:14

## 2020-10-08 RX ADMIN — INSULIN LISPRO 8 UNITS: 100 INJECTION, SOLUTION INTRAVENOUS; SUBCUTANEOUS at 11:19

## 2020-10-08 RX ADMIN — ARIPIPRAZOLE 15 MG: 15 TABLET ORAL at 08:14

## 2020-10-08 RX ADMIN — ATORVASTATIN CALCIUM 40 MG: 40 TABLET, FILM COATED ORAL at 20:46

## 2020-10-08 RX ADMIN — PIPERACILLIN AND TAZOBACTAM 3.38 G: 3; .375 INJECTION, POWDER, LYOPHILIZED, FOR SOLUTION INTRAVENOUS at 16:49

## 2020-10-08 RX ADMIN — PIPERACILLIN AND TAZOBACTAM 3.38 G: 3; .375 INJECTION, POWDER, LYOPHILIZED, FOR SOLUTION INTRAVENOUS at 08:46

## 2020-10-08 RX ADMIN — PROPOFOL 30 MCG/KG/MIN: 10 INJECTION, EMULSION INTRAVENOUS at 09:58

## 2020-10-08 RX ADMIN — PROPOFOL 30 MCG/KG/MIN: 10 INJECTION, EMULSION INTRAVENOUS at 14:51

## 2020-10-08 RX ADMIN — GABAPENTIN 800 MG: 400 CAPSULE ORAL at 20:46

## 2020-10-08 RX ADMIN — INSULIN LISPRO 6 UNITS: 100 INJECTION, SOLUTION INTRAVENOUS; SUBCUTANEOUS at 16:49

## 2020-10-08 RX ADMIN — PROPOFOL 30 MCG/KG/MIN: 10 INJECTION, EMULSION INTRAVENOUS at 01:39

## 2020-10-08 ASSESSMENT — PAIN SCALES - GENERAL
PAINLEVEL_OUTOF10: 0
PAINLEVEL_OUTOF10: 0

## 2020-10-08 ASSESSMENT — PULMONARY FUNCTION TESTS
PIF_VALUE: 27
PIF_VALUE: 27
PIF_VALUE: 29
PIF_VALUE: 32
PIF_VALUE: 29
PIF_VALUE: 29

## 2020-10-08 NOTE — PLAN OF CARE
Problem: Impaired respiratory status  Goal: Patient will achieve/maintain normal respiratory rate/effort  10/8/2020 1755 by Santo Chambers RCP  Outcome: Ongoing  Note: Vent setting optimized to achieve target tidal volume, respiratory rate and ideal oxygen saturations. SBT will be performed when appropriate.

## 2020-10-08 NOTE — PLAN OF CARE
Problem: Nutrition  Goal: Optimal nutrition therapy  Outcome: Ongoing   Nutrition Problem #1: Inadequate oral intake  Intervention: Food and/or Nutrient Delivery: Continue NPO, Modify Tube Feeding  Nutritional Goals: Patient will tolerate EN adequate for active late phase covid infection

## 2020-10-08 NOTE — PROGRESS NOTES
Comprehensive Nutrition Assessment    Type and Reason for Visit:  Reassess(TF ordering and management)    Nutrition Recommendations/Plan:   Continue Vital 1.2 at 20 ml/hr  Flush 1 2.5ounce liquid protein bottle BID  Water flushes per MD  Continue multivitamin  Spoke with Dr. Urban Guardado - plan to start culturelle for probiotic benefits - has flexiseal and on ATB. Monitoring tolerance, diprivan and renal status for EN adjustments as appropriate. Nutrition Assessment:    Pt. Nutritionally compromised AEB NPO, intubated, renal insufficiency.  Remains at risk for further nutritional compromise r/t admitted with acute respiratory failure with hypoxemia, intubated 9/23 and extubated 10/2 then re-intubated 10/6, convalescent plasma 9/24, obesity, elevated Hbg A1C of 9.9,  and underlying medical condition (hx: CAD, DM, GERD, Alcohol abuse, HLD, HTN, Vitamin D deficiency).  Nutrition recommendations/interventions as per abo    Malnutrition Assessment:  Malnutrition Status:  Insufficient data(+covid)    Context:  Acute Illness     Findings of the 6 clinical characteristics of malnutrition:  Energy Intake:  Mild decrease in energy intake (Comment)(good appetite or on EN at goal since admit)  Weight Loss:  (16# or 5% in 2 months)     Body Fat Loss:  Unable to assess(+covid patient)     Muscle Mass Loss:  Unable to assess(+covid)    Fluid Accumulation:  1 - Mild Extremities   Strength:  Not Performed    Estimated Daily Nutrient Needs:  Energy (kcal):  4265-2148 (30-32 kcals/kg IBW in active late phase);  Weight Used for Energy Requirements:  Ideal(70 kg - ideal weight)     Protein (g):  ~140 gms (2/kgm IBW) as renal status allows; Weight Used for Protein Requirements:  Ideal(70 kg)        Fluid (ml/day):  per MD; Weight Used for Fluid Requirements:  (n/a)      Nutrition Related Findings:  covid; reintubated 10/6; tolerating EN at 20ml/hour this am so adding protein modulars; renal function improved - good UO (2800ml);

## 2020-10-08 NOTE — PLAN OF CARE
Pr  Problem: Restraint Use - Nonviolent/Non-Self-Destructive Behavior:  Goal: Absence of restraint indications  Description: Absence of restraint indications  Outcome: Ongoing  Note: Patient remains in nonviolent soft wrist restraints this shift due to pulling at lines and tubes.       Problem: Restraint Use - Nonviolent/Non-Self-Destructive Behavior:  Goal: Absence of restraint-related injury  Description: Absence of restraint-related injury  Note: Patient remains free from restraint related injury this shift

## 2020-10-08 NOTE — PLAN OF CARE
Problem: Falls - Risk of:  Goal: Will remain free from falls  Description: Will remain free from falls  10/7/2020 2227 by Nigel Tomas RN  Outcome: Ongoing  Note: Call light in reach, bed in lowest position, and bed alarm activated. Education given on use of call light before ambulation and when in need of assistance. Hourly visual checks performed and charted. Toileting offered to patient. No falls this shift, at any time. Arm band and falling star in place. Will continue to monitor. Problem: Falls - Risk of:  Goal: Absence of physical injury  10/7/2020 2227 by Nigel Tomas RN  Outcome: Ongoing  Note: Patient remains free from physical injury this shift. Frequent checks performed and charted on patient. Problem: Skin Integrity:  Goal: Will show no infection signs and symptoms  Description: Will show no infection signs and symptoms  10/7/2020 2227 by Nigel Tomas RN  Outcome: Ongoing  Note: Patient afebrile at this time. Patient does not show any sign or symptom of infection related to skin integrity. Problem: Skin Integrity:  Goal: Absence of new skin breakdown  Description: Absence of new skin breakdown  10/7/2020 2227 by Nigel Tomas RN  Outcome: Ongoing  Note: No signs of skin breakdown. Skin warm, dry, and intact. Mucous membranes pink and moist.  Assistance with turns/ambulation provided PRN. Will continue to monitor. Problem: Discharge Planning:  Goal: Discharged to appropriate level of care  Description: Discharged to appropriate level of care  Outcome: Ongoing  Note: Patient's discharge disposition remains unknown at this time. Problem: Urinary Retention:  Goal: Urinary elimination within specified parameters  Description: Urinary elimination within specified parameters  Outcome: Ongoing  Note: Patient remains with mason catheter in place this shift due to need for accurate intake and output.       Problem: Nutrition  Goal: Optimal

## 2020-10-08 NOTE — CARE COORDINATION
10/8/20, 12:44 PM EDT    DISCHARGE ON GOING [address] day: 15  Location: -06/006-A Reason for admit: Acute respiratory failure with hypoxemia ([facility] [address] [J96.01]  Acute respiratory failure with hypoxia ([facility] [address] [J96.01]   Procedure:   9/23 CXR: Bilateral pulmonary opacification worse than on previous study dated 10 September 2020. This may represent worsening inflammatory process, possibly Covid 19 infection; borderline cardiomegaly  9/23 Intubated  9/24 CVC left subclavian - 9/30 removed  8/21 PICC right basilic  56/0 Extubated to bipap  10/6 Reintubated  10/6 Renal US: Negative  10/7 CT Abd/pelvis: Bilateral lower lobe pneumonia. Known Covid; Distended colon with fluid, air and stool  10/8 CXR: No acute findings    Treatment Plan of Care: Received 2 PRBC on 10/7 to improve oxygen delivery and intravascular volume. Remains on vent w/ETT on PCMV, Peep 6, FIO2 21%, sats 95%. Tmax 100.6. NSR. Follows commands. Dietitian consulted. PT/OT signed off - will need new orders when appropriate. Respiratory culture + MRSA. Urine +yeast. Cardiac monitoring, I&O, daily weight, oral care, OG w/TF, flexiseal, mason care. IVF, Levo @ 5 mcg/min, diprivan @ 30 mcg/kg/min, Allopurinol, abilify, asa, lipitor, celexa, IV doxycycline #3/10, lovenox bid, folic acid, neurontin, inhaler, lantus, SSI ACHS, midodrine, protonix, IV zosyn, vit D,  Electrolyte replacement protocols. K+ 3.1, CO2 19, BUN 38, Creat 3.5, procal 10.69, alb 2.6, wbc 16.7, hgb 8.4. Barriers to Discharge: on vent  PCP: Arsenio Calderon MD  Readmission Risk Score: 51%  Patient Goals/Plan/Treatment Preferences: From home alone and current [facility] for RN/PT/OT. SW on case. Plan pending course - probable TCU vs LTACH. Will need PT/OT when appropriate.

## 2020-10-08 NOTE — PLAN OF CARE
Problem: Impaired respiratory status  Goal: Patient will achieve/maintain normal respiratory rate/effort  10/8/2020 0623 by Adebayo Flood RCP  Outcome: Ongoing     Patient remains on ventilator with lung protective strategies in place. Will continue to monitor patient for weaning readiness.

## 2020-10-08 NOTE — PROGRESS NOTES
negative. 5. Hypotension: Status post pressors and volume resuscitation. Screen for adrenal insufficiency was negative.  Continues to require pressors. Etiology is secondary to volume contraction and sepsis. Multifactorial.  Continue with volume resuscitation. As patient undergoes volume resuscitation, pressor requirements are diminishing. Suspect this is primarily volume contraction and not septic shock. Continues to slowly improve. 6. Pneumonia: Secondary to MRSA. Status post 8 days of vancomycin. Patient developed new onset fever while on vancomycin. Vancomycin was subsequently discontinued and doxycycline was initiated. Radiographically, there is not much change. As fever developed while on vancomycin, vancomycin was discontinued. PCR of pulmonary lavage still showed MRSA within the sputum. This may represent contamination. However, patient will continue with doxycycline for a total of 10 days. Day #3/10 doxycycline. 7. Anemia: 2 g hemoglobin loss. Doubt represents acute bleed. Suspect delusional.  Obtain CT scan abdomen and pelvis to exclude retroperitoneal hemorrhage. Transfuse 2 units packed red blood cells to improve oxygen delivery and intravascular volume. .  8. Type 2 diabetes mellitus: Subcutaneous insulin. 9. Diastolic heart failure: On calcium channel blocker and diuretic.  Amlodipine on hold secondary to hypotension. 10. Obstructive sleep apnea: Secondary morbid obesity.  CPAP/BiPAP noncompliant. 11. Hyperlipidemia: On atorvastatin. 12. Hypertension:  Currently hypotensive.  Amlodipine discontinued. 13. Gout: On allopurinol. 14. COVID-19: Convalescent plasma received 9/24/2020.   Resolved.     CC: Respiratory failure  HPI: Patient is a 58-year-old morbidly obese black male lifetime non-smoker. Rachel Burnette has a history of morbid obesity associated with type 2 diabetes mellitus, prior alcohol abuse, hyperlipidemia, hypertension, hypogonadism, nonalcoholic steatohepatitis, obstructive sleep apnea, and gout.  Patient was hospitalized 9/6/2020 through 9/15/2020 with hypoxemic respiratory failure secondary to COVID-19 associated with diffuse bilateral infiltrates.  At that time, patient received Decadron, remdesivir, and danazol.  During hospitalization, he had issues with atrial fibrillation.  His insulin therapy required adjustment.  He was discharged home on subcutaneous Lovenox. Patient returned back to the emergency room on 9/23/2020 with increasing SOB and progressive hypoxia. CXR showed diffuse infiltrates.  Deteriorated and required intubation. Patient underwent bronchoscopy on 9/27/2020 which demonstrated no mucus production. Patient extubated 10/2/2020. Patient reintubated for progressive respiratory failure with progressive obtundation on 10/6/2020.  This was associated with acute oliguric renal failure.  Patient was intubated 10/6/2020, and underwent volume resuscitation. Fractional excretion of sodium returned less than 1 which was consistent with prerenal azotemia. After volume resuscitation, patient demonstrated that he was hemoconcentrated with a drop of 2 g in the hemoglobin. With volume resuscitation, urine output did improve. After patient was intubated on 10/6/2020, he underwent pulmonary lavage which showed MRSA on the PCR but no other organism. However, patient was found to have greater than 200 white blood cells in the urine. Patient was treated with Zosyn and doxycycline. Previously, patient had received vancomycin and developed fever while on vancomycin. Therefore vancomycin was not continued. For further details, please review the assessment and plan. ROS:  Patient  sedated to comfort on mechanical ventilator. PMH:  Per HPI  SHX: Lifetime non-smoker  FHX: Positive for heart disease.   Allergies: PCN  Medications:     0.9% NaCl with KCl 20 mEq 100 mL/hr at 10/08/20 1722    propofol 30 mcg/kg/min (10/08/20 1451)    norepinephrine 5 mcg/min (10/08/20 0500)    dextrose        enoxaparin  30 mg Subcutaneous Daily    citalopram  10 mg Oral Daily    insulin lispro  0-12 Units Subcutaneous TID WC    insulin lispro  0-6 Units Subcutaneous Nightly    doxycycline (VIBRAMYCIN) IV  100 mg Intravenous Q12H    chlorhexidine  15 mL Mouth/Throat BID    insulin glargine  30 Units Subcutaneous Nightly    midodrine  5 mg Oral TID WC    lidocaine 1 % injection  5 mL Intradermal Once    sodium chloride flush  10 mL Intravenous 2 times per day    piperacillin-tazobactam  3.375 g Intravenous Q8H    multivitamin  1 tablet Oral Daily    pantoprazole  40 mg Oral QAM AC    magnesium replacement protocol   Other RX Placeholder    phosphorus replacement protocol   Other RX Placeholder    calcium replacement protocol   Other RX Placeholder    polyethylene glycol  17 g Oral Every Other Day    allopurinol  200 mg Oral Daily    ARIPiprazole  15 mg Oral Daily    aspirin  81 mg Oral Daily    atorvastatin  40 mg Oral Nightly    Vitamin D  2,000 Units Oral Daily    folic acid  1 mg Oral Daily    gabapentin  800 mg Oral BID    sodium chloride flush  10 mL Intravenous 2 times per day    glycopyrrolate-formoterol  2 puff Inhalation BID    senna  1 tablet Oral BID     Vital Signs:   T: 99.3: P: 64: RR: 20: B/P: 110/62: FiO2: 21: O2 Sat: 99: I/O: 1673/1300  GCS:  10: Body mass index is 45.15 kg/m². [place] Glow General:   Morbidly obese black male. HEENT:  normocephalic and atraumatic.  No scleral icterus. PERR  Neck: supple.  No Thyromegaly. Lungs: Clear to anterior auscultation. Respirations unlabored. Cardiac: RRR.  No JVD. Abdomen: soft.  Nontender.  Large panniculus. Extremities:  No clubbing, cyanosis x 4. No lower extremity edema bilaterally. Vasculature: capillary refill < 3 seconds. Palpable dorsalis pedis pulses. Skin:  warm and dry. Psych:    Sedated on mechanical ventilator. [place] Glow Lymph:  No supraclavicular adenopathy. Neurologic:  No focal deficit.  No seizures.     Data: (All radiographs, tracings, PFTs, and imaging are personally viewed and interpreted unless otherwise noted). · Telemetry shows sinus rhythm. · Echocardiogram shows an ejection fraction of 60%. .  · Sputum collected 9/23/2020: Positive for MRSA. · Sputum PCR collected 10/6/2020 is positive for MRSA. · Renal ultrasound report 10/6/2020 shows normal kidneys. · Chest x-ray shows bilateral infiltrates. · Glucose 250. Sodium 137, potassium 3.1, chloride 102, bicarb 19, BUN 38, creatinine 3.5, pro calcitonin 10.6. White blood cell count 16.7, hemoglobin 8.4, platelets 111.     CC time 35 minutes.  Time was discontiguous. Electronically signed by Marco Case M.D.

## 2020-10-08 NOTE — PROGRESS NOTES
[facility]         Renal Inpatient Follow-Up note         10/8/2020 8:46 AM    Pt Name:   Miguel Anand  YOB: 1970  Attending:   Maria Del Rosario Mariscal MD    Chief Complaint : Miguel Anand is a 44 y.o. male being followed by nephrology for acute kidney injury    Interval History :   Patient seen and examined by me. No distress  Intubated cannot accurately assess history or review of systems  Patient is only on 21% FiO2   Making excellent urine output 2800 mL yesterday.   Pressor requirement is also going down     Scheduled Medications :    enoxaparin  30 mg Subcutaneous Daily    citalopram  10 mg Oral Daily    insulin lispro  0-12 Units Subcutaneous TID WC    insulin lispro  0-6 Units Subcutaneous Nightly    doxycycline (VIBRAMYCIN) IV  100 mg Intravenous Q12H    chlorhexidine  15 mL Mouth/Throat BID    insulin glargine  30 Units Subcutaneous Nightly    midodrine  5 mg Oral TID WC    lidocaine 1 % injection  5 mL Intradermal Once    sodium chloride flush  10 mL Intravenous 2 times per day    piperacillin-tazobactam  3.375 g Intravenous Q8H    multivitamin  1 tablet Oral Daily    pantoprazole  40 mg Oral QAM AC    magnesium replacement protocol   Other RX Placeholder    phosphorus replacement protocol   Other RX Placeholder    calcium replacement protocol   Other RX Placeholder    polyethylene glycol  17 g Oral Every Other Day    allopurinol  200 mg Oral Daily    ARIPiprazole  15 mg Oral Daily    aspirin  81 mg Oral Daily    atorvastatin  40 mg Oral Nightly    Vitamin D  2,000 Units Oral Daily    folic acid  1 mg Oral Daily    gabapentin  800 mg Oral BID    sodium chloride flush  10 mL Intravenous 2 times per day    glycopyrrolate-formoterol  2 puff Inhalation BID    senna  1 tablet Oral BID      propofol 30 mcg/kg/min (10/08/20 0542)    sodium chloride 100 mL/hr at 10/07/20 0803    norepinephrine 5 mcg/min (10/08/20 0500)    dextrose Vitals :  /66   Pulse 75   Temp 99.7 °F (37.6 °C) (Bladder)   Resp 19   Ht 5' 8\" (1.727 m)   Wt 296 lb 15.4 oz (134.7 kg)   SpO2 92%   BMI 45.15 kg/m²     24HR INTAKE/OUTPUT:      Intake/Output Summary (Last 24 hours) at 10/8/2020 0846  Last data filed at 10/8/2020 0442  Gross per 24 hour   Intake 7939.67 ml   Output 2825 ml   Net 5114.67 ml     Last 3 weights  Wt Readings from Last 3 Encounters:   10/08/20 296 lb 15.4 oz (134.7 kg)   09/15/20 281 lb 6.4 oz (127.6 kg)   08/21/20 (!) 306 lb 6.4 oz (139 kg)           Physical Exam : Due to COVID-19 situation termination is limited exam from outside the room  General Appearance: Obese well-nourished no distress  Mouth/Throat: ET tube in place  CNS not responsive to verbal commands  Psych-not agitated  Abdomen: Appears slightly distended  Musculoskeletal:  Edema -none as per RN         Last 3 CBC   Recent Labs     10/06/20  0456 10/07/20  0531 10/07/20  1511 10/08/20  0307   WBC 37.4* 22.0*  --  16.7*   RBC 3.41* 2.51*  --  2.90*   HGB 9.7* 7.1* 8.6* 8.4*   HCT 31.5* 23.2* 27.4* 26.4*   * 330  --  339     Last 3 CMP  Recent Labs     10/06/20  0456 10/07/20  0531 10/07/20  1511 10/08/20  0307    137  --  137   K 3.4* 2.9* 3.5 3.1*   CL 94* 99  --  102   CO2 26 21*  --  19*   BUN 28* 35*  --  38*   CREATININE 3.3* 4.1*  --  3.5*   CALCIUM 9.6 8.4*  --  8.7   LABALBU 3.1* 2.4*  --  2.6*   BILITOT 1.1 0.5  --  0.4             ASSESSMENT / Plan   1 Renal -acute kidney injury most likely due to septic ATN/hypotension  ? Does have slightly elevated vancomycin level but I doubt this has any impact on the renal function  ? Making excellent urine output and creatinine improving as well  ? Continue IV fluids  ? No acute need for renal replacement therapy    2 Electrolytes -hypokalemia-will be repleted, probably due to hydration and diuresis.   Will add some potassium in the IV fluids as well  3 Mild acidosis secondary to fluids follow for now might need bicarbonate infusion  4 Anemia trending down again no signs of bleeding. Mostly due to IV hydration. Maintaining for now  5 Hypotension mostly due to sepsis/volume depletion continue IV hydration and wean pressors to map greater than 65  6 Hx of diabetes mellitus  7 Hx of COVID-19 pneumonia  8 Ventilator dependent respiratory failure  9 Hx of diastolic dysfunction volume status reasonable closely follow  10 Meds reviewed and discussed with nursing staff    EMELY Hawkins D.  [facility].

## 2020-10-09 LAB
ALBUMIN SERPL-MCNC: 2.5 G/DL (ref 3.5–5.1)
ALP BLD-CCNC: 84 U/L (ref 38–126)
ALT SERPL-CCNC: 27 U/L (ref 11–66)
ANA SCREEN: NORMAL
ANION GAP SERPL CALCULATED.3IONS-SCNC: 13 MEQ/L (ref 8–16)
AST SERPL-CCNC: 33 U/L (ref 5–40)
BASOPHILS # BLD: 0.3 %
BASOPHILS ABSOLUTE: 0 THOU/MM3 (ref 0–0.1)
BILIRUB SERPL-MCNC: 0.3 MG/DL (ref 0.3–1.2)
BUN BLDV-MCNC: 40 MG/DL (ref 7–22)
CALCIUM SERPL-MCNC: 8.9 MG/DL (ref 8.5–10.5)
CHLORIDE BLD-SCNC: 111 MEQ/L (ref 98–111)
CO2: 18 MEQ/L (ref 23–33)
CREAT SERPL-MCNC: 3.2 MG/DL (ref 0.4–1.2)
EOSINOPHIL # BLD: 1.9 %
EOSINOPHILS ABSOLUTE: 0.2 THOU/MM3 (ref 0–0.4)
ERYTHROCYTE [DISTWIDTH] IN BLOOD BY AUTOMATED COUNT: 16.5 % (ref 11.5–14.5)
ERYTHROCYTE [DISTWIDTH] IN BLOOD BY AUTOMATED COUNT: 56.2 FL (ref 35–45)
GFR SERPL CREATININE-BSD FRML MDRD: 25 ML/MIN/1.73M2
GLUCOSE BLD-MCNC: 216 MG/DL (ref 70–108)
GLUCOSE BLD-MCNC: 221 MG/DL (ref 70–108)
GLUCOSE BLD-MCNC: 222 MG/DL (ref 70–108)
GLUCOSE BLD-MCNC: 238 MG/DL (ref 70–108)
GLUCOSE BLD-MCNC: 240 MG/DL (ref 70–108)
HCT VFR BLD CALC: 26 % (ref 42–52)
HEMOGLOBIN: 8 GM/DL (ref 14–18)
IMMATURE GRANS (ABS): 0.07 THOU/MM3 (ref 0–0.07)
IMMATURE GRANULOCYTES: 0.6 %
LACTIC ACID: 1 MMOL/L (ref 0.5–2.2)
LYMPHOCYTES # BLD: 7.9 %
LYMPHOCYTES ABSOLUTE: 0.9 THOU/MM3 (ref 1–4.8)
MCH RBC QN AUTO: 28.4 PG (ref 26–33)
MCHC RBC AUTO-ENTMCNC: 30.8 GM/DL (ref 32.2–35.5)
MCV RBC AUTO: 92.2 FL (ref 80–94)
MONOCYTES # BLD: 6.5 %
MONOCYTES ABSOLUTE: 0.7 THOU/MM3 (ref 0.4–1.3)
NUCLEATED RED BLOOD CELLS: 0 /100 WBC
PLATELET # BLD: 312 THOU/MM3 (ref 130–400)
PMV BLD AUTO: 11.3 FL (ref 9.4–12.4)
POTASSIUM SERPL-SCNC: 3.1 MEQ/L (ref 3.5–5.2)
POTASSIUM SERPL-SCNC: 3.6 MEQ/L (ref 3.5–5.2)
PROCALCITONIN: 4.51 NG/ML (ref 0.01–0.09)
RBC # BLD: 2.82 MILL/MM3 (ref 4.7–6.1)
SEG NEUTROPHILS: 82.8 %
SEGMENTED NEUTROPHILS ABSOLUTE COUNT: 9.4 THOU/MM3 (ref 1.8–7.7)
SODIUM BLD-SCNC: 142 MEQ/L (ref 135–145)
TOTAL PROTEIN: 6 G/DL (ref 6.1–8)
WBC # BLD: 11.3 THOU/MM3 (ref 4.8–10.8)

## 2020-10-09 PROCEDURE — 6370000000 HC RX 637 (ALT 250 FOR IP): Performed by: HOSPITALIST

## 2020-10-09 PROCEDURE — 94003 VENT MGMT INPAT SUBQ DAY: CPT

## 2020-10-09 PROCEDURE — 6360000002 HC RX W HCPCS: Performed by: INTERNAL MEDICINE

## 2020-10-09 PROCEDURE — 99291 CRITICAL CARE FIRST HOUR: CPT | Performed by: INTERNAL MEDICINE

## 2020-10-09 PROCEDURE — 36592 COLLECT BLOOD FROM PICC: CPT

## 2020-10-09 PROCEDURE — 84132 ASSAY OF SERUM POTASSIUM: CPT

## 2020-10-09 PROCEDURE — 36415 COLL VENOUS BLD VENIPUNCTURE: CPT

## 2020-10-09 PROCEDURE — 6360000002 HC RX W HCPCS: Performed by: NURSE PRACTITIONER

## 2020-10-09 PROCEDURE — 82948 REAGENT STRIP/BLOOD GLUCOSE: CPT

## 2020-10-09 PROCEDURE — 99233 SBSQ HOSP IP/OBS HIGH 50: CPT | Performed by: INTERNAL MEDICINE

## 2020-10-09 PROCEDURE — 2500000003 HC RX 250 WO HCPCS: Performed by: INTERNAL MEDICINE

## 2020-10-09 PROCEDURE — 94761 N-INVAS EAR/PLS OXIMETRY MLT: CPT

## 2020-10-09 PROCEDURE — 94640 AIRWAY INHALATION TREATMENT: CPT

## 2020-10-09 PROCEDURE — 2580000003 HC RX 258: Performed by: INTERNAL MEDICINE

## 2020-10-09 PROCEDURE — 6370000000 HC RX 637 (ALT 250 FOR IP): Performed by: INTERNAL MEDICINE

## 2020-10-09 PROCEDURE — 84145 PROCALCITONIN (PCT): CPT

## 2020-10-09 PROCEDURE — 94770 HC ETCO2 MONITOR DAILY: CPT

## 2020-10-09 PROCEDURE — 2580000003 HC RX 258: Performed by: NURSE PRACTITIONER

## 2020-10-09 PROCEDURE — 83605 ASSAY OF LACTIC ACID: CPT

## 2020-10-09 PROCEDURE — 85025 COMPLETE CBC W/AUTO DIFF WBC: CPT

## 2020-10-09 PROCEDURE — 2700000000 HC OXYGEN THERAPY PER DAY

## 2020-10-09 PROCEDURE — 80053 COMPREHEN METABOLIC PANEL: CPT

## 2020-10-09 PROCEDURE — 2100000000 HC CCU R&B

## 2020-10-09 RX ORDER — LACTOBACILLUS RHAMNOSUS GG 10B CELL
1 CAPSULE ORAL
Status: DISCONTINUED | OUTPATIENT
Start: 2020-10-09 | End: 2020-10-17

## 2020-10-09 RX ADMIN — PIPERACILLIN AND TAZOBACTAM 3.38 G: 3; .375 INJECTION, POWDER, LYOPHILIZED, FOR SOLUTION INTRAVENOUS at 01:17

## 2020-10-09 RX ADMIN — ENOXAPARIN SODIUM 40 MG: 40 INJECTION SUBCUTANEOUS at 21:23

## 2020-10-09 RX ADMIN — PROPOFOL 30 MCG/KG/MIN: 10 INJECTION, EMULSION INTRAVENOUS at 05:09

## 2020-10-09 RX ADMIN — INSULIN GLARGINE 38 UNITS: 100 INJECTION, SOLUTION SUBCUTANEOUS at 21:39

## 2020-10-09 RX ADMIN — ACETAMINOPHEN 650 MG: 325 TABLET ORAL at 21:26

## 2020-10-09 RX ADMIN — ATORVASTATIN CALCIUM 40 MG: 40 TABLET, FILM COATED ORAL at 21:26

## 2020-10-09 RX ADMIN — DOXYCYCLINE 100 MG: 100 INJECTION, POWDER, LYOPHILIZED, FOR SOLUTION INTRAVENOUS at 09:47

## 2020-10-09 RX ADMIN — GABAPENTIN 800 MG: 400 CAPSULE ORAL at 21:31

## 2020-10-09 RX ADMIN — ACETAMINOPHEN 650 MG: 325 TABLET ORAL at 04:40

## 2020-10-09 RX ADMIN — Medication 1 CAPSULE: at 13:23

## 2020-10-09 RX ADMIN — MIDODRINE HYDROCHLORIDE 5 MG: 5 TABLET ORAL at 17:53

## 2020-10-09 RX ADMIN — PANTOPRAZOLE SODIUM 40 MG: 40 TABLET, DELAYED RELEASE ORAL at 04:41

## 2020-10-09 RX ADMIN — POTASSIUM BICARBONATE 20 MEQ: 782 TABLET, EFFERVESCENT ORAL at 09:02

## 2020-10-09 RX ADMIN — POTASSIUM CHLORIDE 20 MEQ: 400 INJECTION, SOLUTION INTRAVENOUS at 10:23

## 2020-10-09 RX ADMIN — PIPERACILLIN AND TAZOBACTAM 3.38 G: 3; .375 INJECTION, POWDER, LYOPHILIZED, FOR SOLUTION INTRAVENOUS at 09:04

## 2020-10-09 RX ADMIN — GLYCOPYRROLATE AND FORMOTEROL FUMARATE 2 PUFF: 9; 4.8 AEROSOL, METERED RESPIRATORY (INHALATION) at 09:52

## 2020-10-09 RX ADMIN — POTASSIUM CHLORIDE 20 MEQ: 400 INJECTION, SOLUTION INTRAVENOUS at 08:46

## 2020-10-09 RX ADMIN — INSULIN LISPRO 4 UNITS: 100 INJECTION, SOLUTION INTRAVENOUS; SUBCUTANEOUS at 11:34

## 2020-10-09 RX ADMIN — ENOXAPARIN SODIUM 30 MG: 30 INJECTION SUBCUTANEOUS at 08:48

## 2020-10-09 RX ADMIN — PIPERACILLIN AND TAZOBACTAM 3.38 G: 3; .375 INJECTION, POWDER, LYOPHILIZED, FOR SOLUTION INTRAVENOUS at 17:53

## 2020-10-09 RX ADMIN — POTASSIUM CHLORIDE AND SODIUM CHLORIDE: 900; 150 INJECTION, SOLUTION INTRAVENOUS at 05:29

## 2020-10-09 RX ADMIN — POTASSIUM CHLORIDE AND SODIUM CHLORIDE: 900; 150 INJECTION, SOLUTION INTRAVENOUS at 21:18

## 2020-10-09 RX ADMIN — ALLOPURINOL 200 MG: 100 TABLET ORAL at 08:46

## 2020-10-09 RX ADMIN — SODIUM CHLORIDE, PRESERVATIVE FREE 10 ML: 5 INJECTION INTRAVENOUS at 08:47

## 2020-10-09 RX ADMIN — Medication 2000 UNITS: at 08:47

## 2020-10-09 RX ADMIN — INSULIN LISPRO 2 UNITS: 100 INJECTION, SOLUTION INTRAVENOUS; SUBCUTANEOUS at 21:37

## 2020-10-09 RX ADMIN — MIDODRINE HYDROCHLORIDE 5 MG: 5 TABLET ORAL at 08:46

## 2020-10-09 RX ADMIN — Medication 15 ML: at 21:22

## 2020-10-09 RX ADMIN — CITALOPRAM 10 MG: 20 TABLET, FILM COATED ORAL at 08:46

## 2020-10-09 RX ADMIN — Medication 15 ML: at 08:46

## 2020-10-09 RX ADMIN — INSULIN LISPRO 4 UNITS: 100 INJECTION, SOLUTION INTRAVENOUS; SUBCUTANEOUS at 17:54

## 2020-10-09 RX ADMIN — DOXYCYCLINE 100 MG: 100 INJECTION, POWDER, LYOPHILIZED, FOR SOLUTION INTRAVENOUS at 22:35

## 2020-10-09 RX ADMIN — PROPOFOL 30 MCG/KG/MIN: 10 INJECTION, EMULSION INTRAVENOUS at 08:46

## 2020-10-09 RX ADMIN — PROPOFOL 30 MCG/KG/MIN: 10 INJECTION, EMULSION INTRAVENOUS at 17:53

## 2020-10-09 RX ADMIN — FOLIC ACID 1 MG: 1 TABLET ORAL at 08:47

## 2020-10-09 RX ADMIN — MIDODRINE HYDROCHLORIDE 5 MG: 5 TABLET ORAL at 11:37

## 2020-10-09 RX ADMIN — SODIUM CHLORIDE, PRESERVATIVE FREE 10 ML: 5 INJECTION INTRAVENOUS at 21:24

## 2020-10-09 RX ADMIN — THERA TABS 1 TABLET: TAB at 08:47

## 2020-10-09 RX ADMIN — INSULIN LISPRO 4 UNITS: 100 INJECTION, SOLUTION INTRAVENOUS; SUBCUTANEOUS at 08:48

## 2020-10-09 RX ADMIN — GABAPENTIN 800 MG: 400 CAPSULE ORAL at 08:47

## 2020-10-09 RX ADMIN — ARIPIPRAZOLE 15 MG: 15 TABLET ORAL at 08:46

## 2020-10-09 RX ADMIN — ASPIRIN 81 MG: 81 TABLET, CHEWABLE ORAL at 08:46

## 2020-10-09 RX ADMIN — GLYCOPYRROLATE AND FORMOTEROL FUMARATE 2 PUFF: 9; 4.8 AEROSOL, METERED RESPIRATORY (INHALATION) at 20:34

## 2020-10-09 RX ADMIN — PROPOFOL 35 MCG/KG/MIN: 10 INJECTION, EMULSION INTRAVENOUS at 22:48

## 2020-10-09 RX ADMIN — PROPOFOL 30 MCG/KG/MIN: 10 INJECTION, EMULSION INTRAVENOUS at 12:39

## 2020-10-09 ASSESSMENT — PULMONARY FUNCTION TESTS
PIF_VALUE: 31
PIF_VALUE: 32
PIF_VALUE: 31
PIF_VALUE: 31

## 2020-10-09 NOTE — ADT AUTH CERT
well..    3. Acute renal failure: Patient has no IV contrast exposure.  Acute deterioration 10/6/2020.  Renal ultrasound was unimpressive.  Fractional excretion of sodium was less than 1 and was consistent with volume contraction.  Patient had hemodilution with volume resuscitation with a drop in hemoglobin of 2 g.  This also goes along with volume contraction.  Pressor requirements have significantly diminished as patient volume resuscitated.  Appreciate nephrology's assistance.  Vasculitis screen pending.  Urine output has improved with volume resuscitation. 4. Sepsis: Secondary to pneumonia and COVID-19.  Associated with tachypnea and pneumonia, initially. Pneumonia was secondary to MRSA and was treated with vancomycin.  As fever developed on 10/5/2020, vancomycin was discontinued and doxycycline was initiated.  Antibiotics also expanded to include Zosyn.  Both doxycycline and Zosyn were initiated on 10/6/2020.  Urinalysis showed greater than 200 white blood cells.  I suspect new onset fever is related to urinary tract infection. Sputum PCR shows persistent MRSA, which may represent colonization.  Blood cultures are pending. 5. Hypotension: Status post pressors and volume resuscitation. Screen for adrenal insufficiency was negative.  Continues to require pressors.  Etiology is secondary to volume contraction and sepsis.  Multifactorial.  Continue with volume resuscitation.  As patient undergoes volume resuscitation, pressor requirements are diminishing.  Suspect this is primarily volume contraction and not septic shock.     6. Pneumonia: Secondary to MRSA.  Status post 8 days of vancomycin.  Patient developed new onset fever while on vancomycin.  Vancomycin was subsequently discontinued and doxycycline was initiated.  Radiographically, there is not much change.  As fever developed while on vancomycin, vancomycin was discontinued.  PCR of pulmonary lavage still showed MRSA within the sputum.  This may represent contamination.  However, patient will continue with doxycycline for a total of 10 days.  Day #2/10 doxycycline. 7. Anemia: 2 g hemoglobin loss.  Doubt represents acute bleed.  Suspect delusional.  Obtain CT scan abdomen and pelvis to exclude retroperitoneal hemorrhage.  Transfuse 2 units packed red blood cells to improve oxygen delivery and intravascular volume. .    8. Type 2 diabetes mellitus: Subcutaneous insulin. 9. Diastolic heart failure: On calcium channel blocker and diuretic.  Amlodipine on hold secondary to hypotension. 10. Obstructive sleep apnea: Secondary morbid obesity.  CPAP/BiPAP noncompliant. 11. Hyperlipidemia: On atorvastatin. 12. Hypertension:  Currently hypotensive.  Amlodipine discontinued. 13. Gout: On allopurinol. 14. COVID-19: Convalescent plasma received 9/24/2020Louis Chen.         CC: Respiratory failure    HPI: Patient is a 66-year-old morbidly obese black male lifetime non-smoker. Rachel Burnette has a history of morbid obesity associated with type 2 diabetes mellitus, prior alcohol abuse, hyperlipidemia, hypertension, hypogonadism, nonalcoholic steatohepatitis, obstructive sleep apnea, and gout.  Patient was hospitalized 9/6/2020 through 9/15/2020 with hypoxemic respiratory failure secondary to COVID-19 associated with diffuse bilateral infiltrates.  At that time, patient received Decadron, remdesivir, and danazol.  During hospitalization, he had issues with atrial fibrillation.  His insulin therapy required adjustment.  He was discharged home on subcutaneous Lovenox. Patient returned back to the emergency room on 9/23/2020 with increasing SOB and progressive hypoxia. CXR showed diffuse infiltrates.  Deteriorated and required intubation. Patient underwent bronchoscopy on 9/27/2020 which demonstrated no mucus production. Patient extubated 10/2/2020.     Patient reintubated for progressive respiratory failure with progressive obtundation on 10/6/2020.  This was associated with acute oliguric renal failure.  Patient was intubated 10/6/2020, and underwent volume resuscitation.  Fractional excretion of sodium returned less than 1 which was consistent with prerenal azotemia.  After volume resuscitation, patient demonstrated that he was hemoconcentrated with a drop of 2 g in the hemoglobin.  With volume resuscitation, urine output did improve. After patient was intubated on 10/6/2020, he underwent pulmonary lavage which showed MRSA on the PCR but no other organism.  However, patient was found to have greater than 200 white blood cells in the urine.  Patient was treated with Zosyn and doxycycline.  Previously, patient had received vancomycin and developed fever while on vancomycin.  Therefore vancomycin was not continued. For further details, please review the assessment and plan. ROS:  Patient  sedated to comfort on mechanical ventilator. PMH:  Per HPI    SHX: Lifetime non-smoker    FHX: Positive for heart disease.     Allergies: PCN    Medications:      Infusions Meds    · propofol 25 mcg/kg/min (10/07/20 0324)    · sodium chloride 150 mL/hr at 10/07/20 0022    · norepinephrine 7 mcg/min (10/06/20 2005)    · dextrose          Scheduled Medications    · sodium chloride 20 mL Intravenous Once    · enoxaparin 30 mg Subcutaneous Daily    · citalopram 10 mg Oral Daily    · potassium chloride 60 mEq Intravenous Once    · doxycycline (VIBRAMYCIN)  mg Intravenous Q12H    · chlorhexidine 15 mL Mouth/Throat BID    · insulin glargine 30 Units Subcutaneous Nightly    · midodrine 5 mg Oral TID WC    · lidocaine 1 % injection 5 mL Intradermal Once    · sodium chloride flush 10 mL Intravenous 2 times per day    · piperacillin-tazobactam 3.375 g Intravenous Q8H    · multivitamin 1 tablet Oral Daily    · pantoprazole 40 mg Oral QAM AC    · insulin lispro 0-6 Units Subcutaneous TID WC    · insulin lispro 0-3 Units Subcutaneous Nightly    · magnesium replacement protocol Other RX Placeholder    · phosphorus replacement protocol Other RX Placeholder    · calcium replacement protocol Other RX Placeholder    · polyethylene glycol 17 g Oral Every Other Day    · allopurinol 200 mg Oral Daily    · ARIPiprazole 15 mg Oral Daily    · aspirin 81 mg Oral Daily    · atorvastatin 40 mg Oral Nightly    · Vitamin D 2,000 Units Oral Daily    · folic acid 1 mg Oral Daily    · gabapentin 800 mg Oral BID    · sodium chloride flush 10 mL Intravenous 2 times per day    · glycopyrrolate-formoterol 2 puff Inhalation BID    · senna 1 tablet Oral BID          Vital Signs:     T: 99.7: P: 69: RR: 24: B/P: 102/61: FiO2: 30: O2 Sat: 100: I/O: 3497/845    GCS:  8: T-max 100.9. Body mass index is 43.04 kg/m². [facility] Assessment and Plan(All pulmonary edema, renal failure, PE, and respiratory failure diagnoses are acute in nature unless otherwise specified):          1. Acute hypoxemic respiratory failure: Patient extubated 10/2/2020.  On low-flow oxygen.  Although patient had ongoing radiographic improvement, he had progressive lethargy and subsequent obtundation.  Patient intubated 10/6/2020 for hypercarbic hypoxemic respiratory failure.  Continue with lung protection strategies targeting a peak pressure 35 or less and a plateau pressure of 30 or less. .    2. Acute lung injury: Oxygenating well. .    3. Acute renal failure: Patient has no IV contrast exposure.  Acute deterioration 10/6/2020.  Renal ultrasound was unimpressive.  Fractional excretion of sodium was less than 1 and was consistent with volume contraction.  Patient had hemodilution with volume resuscitation with a drop in hemoglobin of 2 g.  This also goes along with volume contraction.  Pressor requirements have significantly diminished as patient volume resuscitated.  Appreciate nephrology's assistance.  Vasculitis screen pending.  Urine output has improved with volume resuscitation.     4. Sepsis: Secondary to pneumonia and COVID-19.  Associated with tachypnea and pneumonia, initially. Pneumonia was secondary to MRSA and was treated with vancomycin.  As fever developed on 10/5/2020, vancomycin was discontinued and doxycycline was initiated.  Antibiotics also expanded to include Zosyn.  Both doxycycline and Zosyn were initiated on 10/6/2020.  Urinalysis showed greater than 200 white blood cells.  I suspect new onset fever is related to urinary tract infection. Sputum PCR shows persistent MRSA, which may represent colonization.  Blood cultures are pending. 5. Hypotension: Status post pressors and volume resuscitation. Screen for adrenal insufficiency was negative.  Continues to require pressors.  Etiology is secondary to volume contraction and sepsis.  Multifactorial.  Continue with volume resuscitation.  As patient undergoes volume resuscitation, pressor requirements are diminishing.  Suspect this is primarily volume contraction and not septic shock. 6. Pneumonia: Secondary to MRSA.  Status post 8 days of vancomycin.  Patient developed new onset fever while on vancomycin.  Vancomycin was subsequently discontinued and doxycycline was initiated.  Radiographically, there is not much change.  As fever developed while on vancomycin, vancomycin was discontinued.  PCR of pulmonary lavage still showed MRSA within the sputum.  This may represent contamination.  However, patient will continue with doxycycline for a total of 10 days.  Day #2/10 doxycycline. 7. Anemia: 2 g hemoglobin loss.  Doubt represents acute bleed.  Suspect delusional.  Obtain CT scan abdomen and pelvis to exclude retroperitoneal hemorrhage.  Transfuse 2 units packed red blood cells to improve oxygen delivery and intravascular volume. .    8. Type 2 diabetes mellitus: Subcutaneous insulin. 9. Diastolic heart failure: On calcium channel blocker and diuretic.  Amlodipine on hold secondary to hypotension. 10. Obstructive sleep apnea: Secondary morbid obesity.  CPAP/BiPAP noncompliant.     11. Hyperlipidemia: On atorvastatin. 12. Hypertension:  Currently hypotensive.  Amlodipine discontinued. 13. Gout: On allopurinol. 14. COVID-19: Convalescent plasma received 9/24/2020Anna Spaulding.         CC: Respiratory failure    HPI: Patient is a 64-year-old morbidly obese black male lifetime non-smoker. Homer Hamilton has a history of morbid obesity associated with type 2 diabetes mellitus, prior alcohol abuse, hyperlipidemia, hypertension, hypogonadism, nonalcoholic steatohepatitis, obstructive sleep apnea, and gout.  Patient was hospitalized 9/6/2020 through 9/15/2020 with hypoxemic respiratory failure secondary to COVID-19 associated with diffuse bilateral infiltrates.  At that time, patient received Decadron, remdesivir, and danazol.  During hospitalization, he had issues with atrial fibrillation.  His insulin therapy required adjustment.  He was discharged home on subcutaneous Lovenox. Patient returned back to the emergency room on 9/23/2020 with increasing SOB and progressive hypoxia. CXR showed diffuse infiltrates.  Deteriorated and required intubation. Patient underwent bronchoscopy on 9/27/2020 which demonstrated no mucus production. Patient extubated 10/2/2020. Patient reintubated for progressive respiratory failure with progressive obtundation on 10/6/2020.  This was associated with acute oliguric renal failure.  Patient was intubated 10/6/2020, and underwent volume resuscitation.  Fractional excretion of sodium returned less than 1 which was consistent with prerenal azotemia.  After volume resuscitation, patient demonstrated that he was hemoconcentrated with a drop of 2 g in the hemoglobin.  With volume resuscitation, urine output did improve.     After patient was intubated on 10/6/2020, he underwent pulmonary lavage which showed MRSA on the PCR but no other organism.  However, patient was found to have greater than 200 white blood cells in the urine.  Patient was treated with Zosyn and O2 Sat: 100: I/O: 3497/845    GCS:  8: T-max 100.9. Body mass index is 43.04 kg/m². .            Data: (All radiographs, tracings, PFTs, and imaging are personally viewed and interpreted unless otherwise noted). · Telemetry shows sinus rhythm. · Echocardiogram shows an ejection fraction of 60%. .    · Sputum collected 9/23/2020: Positive for MRSA. · Sputum PCR collected 10/6/2020 is positive for MRSA. · Sodium 137, potassium 2.9, chloride 99, bicarb 21, BUN 35, creatinine 4.1, glucose 241.  Procalcitonin 19. 01.  Anion gap 17.  Albumin 2.4.  White blood cell count 22, hemoglobin 7.1, platelets 495. · Chest x-ray demonstrates right middle lobe infiltrate and patchy bilateral infiltrates. · Fractional excretion of sodium is 0.3. · Renal ultrasound report 10/6/2020 shows normal kidneys. .

## 2020-10-09 NOTE — PLAN OF CARE
Problem: Impaired respiratory status  Goal: Patient will achieve/maintain normal respiratory rate/effort  10/9/2020 0652 by Zelalem Oar, RCP  Outcome: Ongoing  Note: Vent setting optimized to achieve target tidal volume, respiratory rate and ideal oxygen saturations. SBT will be performed when appropriate.             Problem: Impaired respiratory status  Goal: Clear lung sounds  10/9/2020 0652 by Ailynann Oar, RCP  Outcome: Ongoing

## 2020-10-09 NOTE — PLAN OF CARE
Problem: Falls - Risk of:  Goal: Will remain free from falls  Description: Will remain free from falls  Outcome: Ongoing  Note: Free from falls. Intubated and sedated. 4/4 side rails up. Bed alarm on. Problem: Falls - Risk of:  Goal: Absence of physical injury  Outcome: Ongoing  Note: Free from physical injury this shift      Problem: Skin Integrity:  Goal: Absence of new skin breakdown  Description: Absence of new skin breakdown  Outcome: Ongoing  Note: Free from any new skin breakdown this shift, turning every two hours. Barrier cream applied. Problem: Discharge Planning:  Goal: Discharged to appropriate level of care  Description: Discharged to appropriate level of care  Outcome: Ongoing  Note: From [facility], plan to return when medically stable. Problem: Urinary Retention:  Goal: Urinary elimination within specified parameters  Description: Urinary elimination within specified parameters  Outcome: Ongoing  Note: Patient has adequate urine output      Problem: Restraint Use - Nonviolent/Non-Self-Destructive Behavior:  Goal: Absence of restraint indications  Description: Absence of restraint indications  Outcome: Ongoing  Note: Patient still requires restraints      Problem: Nutrition  Goal: Optimal nutrition therapy  10/9/2020 0005 by Irma Wills RN  Outcome: Ongoing  Note: Tube feed, vital af 1.2 at 20ml/hr. Problem: Impaired respiratory status  Goal: Patient will achieve/maintain normal respiratory rate/effort  10/9/2020 0005 by Irma Wills RN  Outcome: Ongoing  Note: Respiratory rate within normal range. Problem: Impaired respiratory status  Goal: Clear lung sounds  Outcome: Ongoing  Note: Lungs clear and diminished this shift.       Problem: Serum Glucose Level - Abnormal:  Goal: Ability to maintain appropriate glucose levels will improve  Description: Ability to maintain appropriate glucose levels will improve  Outcome: Ongoing  Note: Blood sugar 268 this shift, sliding scale and long acting insulin given according to [facility]     Care plan reviewed with patient. Patient verbalize understanding of the plan of care and contribute to goal setting.

## 2020-10-09 NOTE — PROGRESS NOTES
Comprehensive Nutrition Assessment    Type and Reason for Visit:  Reassess(TF ordering and management)    Nutrition Recommendations/Plan:   Continue Vital 1.2 at 20 ml/hr - no increase today as abd. semifirm and increasing flexiseal output  Flush 1 2.5ounce liquid protein bottle BID - monitoring renal status for further increase. Water flushes per MD  Continue multivitamin  Added culturelle for probiotic benefits - has flexiseal and on ATB. Monitoring tolerance, diprivan and renal status for EN adjustments as appropriate. Nutrition Assessment:    Pt. Nutritionally compromised AEB NPO, intubated, renal insufficiency.  Remains at risk for further nutritional compromise r/t admitted with acute respiratory failure with hypoxemia, intubated 9/23 and extubated 10/2 then re-intubated 10/6, convalescent plasma 9/24, obesity, elevated Hbg A1C of 9.9,  and underlying medical condition (hx: CAD, DM, GERD, Alcohol abuse, HLD, HTN, Vitamin D deficiency).  Nutrition recommendations/interventions as per abo    Malnutrition Assessment:  Malnutrition Status:  Insufficient data(+covid)    Context:  Acute Illness     Findings of the 6 clinical characteristics of malnutrition:  Energy Intake:  Mild decrease in energy intake (Comment)(good appetite or on EN at goal since admit)  Weight Loss:  (16# or 5% in 2 months)     Body Fat Loss:  Unable to assess(+covid patient)     Muscle Mass Loss:  Unable to assess(+covid)    Fluid Accumulation:  1 - Mild Extremities   Strength:  Not Performed    Estimated Daily Nutrient Needs:  Energy (kcal):  5081-3433 (30-32 kcals/kg IBW in active late phase);  Weight Used for Energy Requirements:  Ideal(70 kg - ideal weight)     Protein (g):  ~140 gms (2/kgm IBW) as renal status allows; Weight Used for Protein Requirements:  Ideal(70 kg)        Fluid (ml/day):  per MD;     Nutrition Related Findings:  covid; reintubated 10/6; tolerating EN at current goal; renal function improved with UO 2500ml but still elevated Creatinine 3.2 BUN 40  glucose 222  MAP 82; meds include IV doxycycline, folic acid, lantus, humalog, MVI, zosyn, vitamin D, levophed, diprivan; +1 edema; flexiseal output 300ml; abd. semifirm; spoke with PharmD and culturelle ordered (RD ok'd with Dr. Lesa Aguilar 10/8)      Wounds:  Stage III, Pressure Injury(hip - per wound care RN healing as of 9/30)       Current Nutrition Therapies:    Current Tube Feeding (TF) Orders:  · Feeding Route: Orogastric(OGT placed 9/23/20)  · Formula: Semi-Elemental(Vital 1.2 started 10/6)  · Schedule: Continuous(increased to 20ml/hour 10/7)  · Additives/Modulars: Protein(1 2.5ounce liquid protein bottle BID started 10/8)  · Water Flushes: per MD  · Goal TF & Flush Orders Provides: 784 kcals (1486 with diprivan lipids), 88 gms protein, 53 gms cho, 2 gms fiber, 810mg K+, 405mg phosphorus, 389ml free H20/24h    Additional Calorie Sources:   diprivan at 23.1ml/hour provides 610 lipid kcals    Anthropometric Measures:  · Height: 5' 8\" (172.7 cm)  · Current Body Weight: 296 lb (134.3 kg)(10/8 with +1 edema)   · Admission Body Weight: 285 lb (129.3 kg)(9/23/20, stated, +1 BLE and BUE edema)    · Usual Body Weight: 306 lb (138.8 kg)(EMR, 6/29/20, actual.  299# 8/15/20, bedscale.)     · Ideal Body Weight: 154 lbs;    · BMI: 45  · BMI Categories: Obese Class 3 (BMI 40.0 or greater)(45.15)       Nutrition Diagnosis:   · Inadequate oral intake related to impaired respiratory function as evidenced by NPO or clear liquid status due to medical condition, intubation, nutrition support - enteral nutrition      Nutrition Interventions:   Food and/or Nutrient Delivery:  Continue NPO, Continue Current Tube Feeding  Nutrition Education/Counseling:  No recommendation at this time   Coordination of Nutrition Care:  Continued Inpatient Monitoring, Interdisciplinary Rounds    Goals:  Patient will tolerate EN adequate for active late phase covid infection       Nutrition Monitoring and

## 2020-10-09 NOTE — PROGRESS NOTES
Patient:  Holger Aguilar                    Unit/Bed:4B-04/004-A  [facility]: [id number]            Jana Stein MD  Date of Admission: 9/23/2020     Assessment and Plan(All pulmonary edema, renal failure, PE, and respiratory failure diagnoses are acute in nature unless otherwise specified):        1. Acute hypoxemic respiratory failure: Patient extubated 10/2/2020.  On low-flow oxygen.  Although patient had ongoing radiographic improvement, he had progressive lethargy and subsequent obtundation.  Patient intubated 10/6/2020 for hypercarbic hypoxemic respiratory failure.  Continue with lung protection strategies targeting a peak pressure 35 or less and a plateau pressure of 30 or less. Patient at risk for requiring tracheostomy tube. 2. Acute lung injury: Oxygenating well. .  3. Acute renal failure: Patient has no IV contrast exposure.  Acute deterioration 10/6/2020.  Renal ultrasound was unimpressive.  Fractional excretion of sodium was less than 1 and was consistent with volume contraction.  Patient had hemodilution with volume resuscitation with a drop in hemoglobin of 2 g.  This also goes along with volume contraction.  Pressor requirements have significantly diminished as patient volume resuscitated.  Appreciate nephrology's assistance. Urine output has improved with volume resuscitation. Creatinine improving. 4. Sepsis: Secondary to pneumonia and COVID-19.  Associated with tachypnea and pneumonia, initially. Pneumonia was secondary to MRSA and was treated with vancomycin.  As fever developed on 10/5/2020, vancomycin was discontinued and doxycycline was initiated.  Antibiotics also expanded to include Zosyn.  Both doxycycline and Zosyn were initiated on 10/6/2020.  Urinalysis showed greater than 200 white blood cells.  I suspect new onset fever is related to urinary tract infection. Sputum PCR shows persistent MRSA, which may represent colonization. Blood cultures are negative.   Patient has completed his course of Zosyn. However he still remains on doxycycline. 5. Hypotension: Status post pressors and volume resuscitation. Screen for adrenal insufficiency was negative.  Continues to require pressors.  Etiology is secondary to volume contraction and sepsis.  Multifactorial.  Continue with volume resuscitation.  As patient undergoes volume resuscitation, pressor requirements are diminishing.  Suspect this is primarily volume contraction and not septic shock. Continues to slowly improve. 6. Pneumonia: Secondary to MRSA.  Status post 8 days of vancomycin.  Patient developed new onset fever while on vancomycin.  Vancomycin was subsequently discontinued and doxycycline was initiated.  Radiographically, there is not much change.  As fever developed while on vancomycin, vancomycin was discontinued.  PCR of pulmonary lavage still showed MRSA within the sputum.  This may represent contamination.  However, patient will continue with doxycycline for a total of 10 days.  Day #4/10 doxycycline. 7. Anemia: Was dilutional..  8. Type 2 diabetes mellitus: Subcutaneous insulin. 9. Diastolic heart failure: On calcium channel blocker and diuretic.  Amlodipine on hold secondary to hypotension. 10. Obstructive sleep apnea: Secondary morbid obesity.  CPAP/BiPAP noncompliant. 11. Hyperlipidemia: On atorvastatin. 12. Hypertension:  Currently hypotensive.  Amlodipine discontinued. 13. Gout: On allopurinol.   15. COVID-19: Convalescent plasma received 9/24/2020.  Resolved.     CC: Respiratory failure  HPI: Patient is a 68-year-old morbidly obese black male lifetime non-smoker. Christi Goltz has a history of morbid obesity associated with type 2 diabetes mellitus, prior alcohol abuse, hyperlipidemia, hypertension, hypogonadism, nonalcoholic steatohepatitis, obstructive sleep apnea, and gout.  Patient was hospitalized 9/6/2020 through 9/15/2020 with hypoxemic respiratory failure secondary to COVID-19 associated with diffuse bilateral infiltrates.  At that time, patient received Decadron, remdesivir, and danazol.  During hospitalization, he had issues with atrial fibrillation.  His insulin therapy required adjustment.  He was discharged home on subcutaneous Lovenox. Patient returned back to the emergency room on 9/23/2020 with increasing SOB and progressive hypoxia. CXR showed diffuse infiltrates.  Deteriorated and required intubation. Patient underwent bronchoscopy on 9/27/2020 which demonstrated no mucus production. Patient extubated 10/2/2020. Patient reintubated for progressive respiratory failure with progressive obtundation on 10/6/2020.  This was associated with acute oliguric renal failure.  Patient was intubated 10/6/2020, and underwent volume resuscitation.  Fractional excretion of sodium returned less than 1 which was consistent with prerenal azotemia.  After volume resuscitation, patient demonstrated that he was hemoconcentrated with a drop of 2 g in the hemoglobin.  With volume resuscitation, urine output did improve. After patient was intubated on 10/6/2020, he underwent pulmonary lavage which showed MRSA on the PCR but no other organism.  However, patient was found to have greater than 200 white blood cells in the urine.  Patient was treated with Zosyn and doxycycline.  Previously, patient had received vancomycin and developed fever while on vancomycin.  Therefore vancomycin was not continued. For further details, please review the assessment and plan. ROS:  Patient  sedated to comfort on mechanical ventilator. PMH:  Per HPI  SHX: Lifetime non-smoker  FHX: Positive for heart disease.   Allergies: PCN  Medications:     0.9% NaCl with KCl 20 mEq 100 mL/hr at 10/09/20 0529    propofol 30 mcg/kg/min (10/09/20 1753)    norepinephrine Stopped (10/09/20 0918)    dextrose        lactobacillus  1 capsule Oral Daily with breakfast    enoxaparin  40 mg Subcutaneous BID    insulin glargine  38 Units Subcutaneous Nightly  citalopram  10 mg Oral Daily    insulin lispro  0-12 Units Subcutaneous TID     insulin lispro  0-6 Units Subcutaneous Nightly    doxycycline (VIBRAMYCIN) IV  100 mg Intravenous Q12H    chlorhexidine  15 mL Mouth/Throat BID    midodrine  5 mg Oral TID WC    lidocaine 1 % injection  5 mL Intradermal Once    sodium chloride flush  10 mL Intravenous 2 times per day    piperacillin-tazobactam  3.375 g Intravenous Q8H    multivitamin  1 tablet Oral Daily    pantoprazole  40 mg Oral QAM AC    magnesium replacement protocol   Other RX Placeholder    phosphorus replacement protocol   Other RX Placeholder    calcium replacement protocol   Other RX Placeholder    polyethylene glycol  17 g Oral Every Other Day    allopurinol  200 mg Oral Daily    ARIPiprazole  15 mg Oral Daily    aspirin  81 mg Oral Daily    atorvastatin  40 mg Oral Nightly    Vitamin D  2,000 Units Oral Daily    folic acid  1 mg Oral Daily    gabapentin  800 mg Oral BID    sodium chloride flush  10 mL Intravenous 2 times per day    glycopyrrolate-formoterol  2 puff Inhalation BID    senna  1 tablet Oral BID     Vital Signs:   T: 99.5: P: 74: RR: 22: B/P: 92/62: FiO2: 21: O2 Sat: 95: I/O: 3446/2800  GCS:  8:  Body mass index is 45.15 kg/m². [facility] General:   Morbidly obese black male. HEENT:  normocephalic and atraumatic.  No scleral icterus. PERR  Neck: supple.  No Thyromegaly. Lungs: Clear to anterior auscultation.  Respirations unlabored. Cardiac: RRR.  No JVD. Abdomen: soft.  Nontender.  Large panniculus. Extremities:  No clubbing, cyanosis x 4. No lower extremity edema bilaterally. Vasculature: capillary refill < 3 seconds. Palpable dorsalis pedis pulses. Skin:  warm and dry. Psych:    Sedated on mechanical ventilator. [facility] Lymph:  No supraclavicular adenopathy. Neurologic:  No focal deficit. No seizures.     Data: (All radiographs, tracings, PFTs, and imaging are personally viewed and interpreted unless otherwise noted). · Telemetry shows sinus rhythm. · Echocardiogram shows an ejection fraction of 60%. .  · Sputum collected 9/23/2020: Positive for MRSA. · Sputum PCR collected 10/6/2020 is positive for MRSA. · Renal ultrasound report 10/6/2020 shows normal kidneys. · Sodium 142, potassium 3.6, chloride 111, bicarb 18, BUN 40, creatinine 3.2, glucose 221. White blood cell count 11.3, hemoglobin 8, platelets 447.     CC time 35 minutes.  Time was discontiguous. Electronically signed by Lovella Class Corrinne Angry M.D.

## 2020-10-09 NOTE — PROGRESS NOTES
[facility]         Renal Inpatient Follow-Up note         10/9/2020 7:59 AM    Pt Name:   Theresa Cowan  YOB: 1970  Attending:   Alba Pittman MD    Chief Complaint : Theresa Cowan is a 44 y.o. male being followed by nephrology for acute kidney injury    Interval History :   Patient seen and examined by me.  No distress  Intubated cannot accurately assess history or review of systems  Patient is only on 21% FiO2   Making excellent urine output pressor requirement is going down only on 1 mcg  He is also more awake     Scheduled Medications :    insulin glargine  38 Units Subcutaneous Nightly    enoxaparin  30 mg Subcutaneous Daily    citalopram  10 mg Oral Daily    insulin lispro  0-12 Units Subcutaneous TID WC    insulin lispro  0-6 Units Subcutaneous Nightly    doxycycline (VIBRAMYCIN) IV  100 mg Intravenous Q12H    chlorhexidine  15 mL Mouth/Throat BID    midodrine  5 mg Oral TID WC    lidocaine 1 % injection  5 mL Intradermal Once    sodium chloride flush  10 mL Intravenous 2 times per day    piperacillin-tazobactam  3.375 g Intravenous Q8H    multivitamin  1 tablet Oral Daily    pantoprazole  40 mg Oral QAM AC    magnesium replacement protocol   Other RX Placeholder    phosphorus replacement protocol   Other RX Placeholder    calcium replacement protocol   Other RX Placeholder    polyethylene glycol  17 g Oral Every Other Day    allopurinol  200 mg Oral Daily    ARIPiprazole  15 mg Oral Daily    aspirin  81 mg Oral Daily    atorvastatin  40 mg Oral Nightly    Vitamin D  2,000 Units Oral Daily    folic acid  1 mg Oral Daily    gabapentin  800 mg Oral BID    sodium chloride flush  10 mL Intravenous 2 times per day    glycopyrrolate-formoterol  2 puff Inhalation BID    senna  1 tablet Oral BID      0.9% NaCl with KCl 20 mEq 100 mL/hr at 10/09/20 0529    propofol 30 mcg/kg/min (10/09/20 0509)    norepinephrine 1 mcg/min (10/09/20 6925)    dextrose         Vitals :  BP 99/62   Pulse 66   Temp 100.9 °F (38.3 °C) (Bladder)   Resp 19   Ht 5' 8\" (1.727 m)   Wt 296 lb 15.4 oz (134.7 kg)   SpO2 92%   BMI 45.15 kg/m²     24HR INTAKE/OUTPUT:      Intake/Output Summary (Last 24 hours) at 10/9/2020 0759  Last data filed at 10/9/2020 0431  Gross per 24 hour   Intake 3446 ml   Output 2800 ml   Net 646 ml     Last 3 weights  Wt Readings from Last 3 Encounters:   10/08/20 296 lb 15.4 oz (134.7 kg)   09/15/20 281 lb 6.4 oz (127.6 kg)   08/21/20 (!) 306 lb 6.4 oz (139 kg)           Physical Exam : Due to COVID-19 situation termination is limited exam from outside the room  General Appearance: Obese well-nourished no distress  Mouth/Throat: ET tube in place  CNS-he is more awake and alert  Psych-not agitated  Abdomen: Appears slightly distended  Musculoskeletal:  Edema -none         Last 3 CBC   Recent Labs     10/07/20  0531 10/07/20  1511 10/08/20  0307 10/09/20  0520   WBC 22.0*  --  16.7* 11.3*   RBC 2.51*  --  2.90* 2.82*   HGB 7.1* 8.6* 8.4* 8.0*   HCT 23.2* 27.4* 26.4* 26.0*     --  339 312     Last 3 CMP  Recent Labs     10/07/20  0531 10/07/20  1511 10/08/20  0307 10/09/20  0520     --  137 142   K 2.9* 3.5 3.1* 3.1*   CL 99  --  102 111   CO2 21*  --  19* 18*   BUN 35*  --  38* 40*   CREATININE 4.1*  --  3.5* 3.2*   CALCIUM 8.4*  --  8.7 8.9   LABALBU 2.4*  --  2.6* 2.5*   BILITOT 0.5  --  0.4 0.3             ASSESSMENT / Plan   1 Renal -acute kidney injury most likely due to septic ATN/hypotension  ? Does have slightly elevated vancomycin level but I doubt this has any impact on the renal function  ? Making excellent urine output and creatinine improving rather slowly currently at 3.2  ? Continue IV fluids  ? No acute need for renal replacement therapy    2 Electrolytes -hypokalemia-we will give 40 IV and 20 oral and continue KCl through the IV  3 Mild acidosis secondary to fluids follow for now. Stable  4 Anemia-stable  5 Hypotension mostly due to sepsis/volume depletion continue IV hydration and wean pressors to map greater than 65. Also on midodrine  6 Hx of diabetes mellitus  7 Hx of COVID-19 pneumonia  8 Ventilator dependent respiratory failure  9 Hx of diastolic dysfunction volume status reasonable closely follow  10 Meds reviewed and discussed with nursing staff    EMELY Houston D.  [facility].

## 2020-10-09 NOTE — PLAN OF CARE
Problem: Nutrition  Goal: Optimal nutrition therapy  10/9/2020 1315 by Jamie Chilel, KURT, LD  Outcome: Ongoing   Nutrition Problem #1: Inadequate oral intake  Intervention: Food and/or Nutrient Delivery: Continue NPO, Continue Current Tube Feeding  Nutritional Goals: Patient will tolerate EN adequate for active late phase covid infection

## 2020-10-09 NOTE — CARE COORDINATION
10/9/20, 9:41 AM EDT    DISCHARGE ON GOING [address] day: 16  Location: -06/006-A Reason for admit: Acute respiratory failure with hypoxemia ([facility] [address] [J96.01]  Acute respiratory failure with hypoxia ([facility] [address] [J96.01]   Procedure:   9/23 CXR: Bilateral pulmonary opacification worse than on previous study dated 10 September 2020. This may represent worsening inflammatory process, possibly Covid 19 infection; borderline cardiomegaly  9/23 Intubated  9/24 CVC left subclavian - 9/30 removed  7/12 PICC right basilic  54/1 Extubated to bipap  10/6 Reintubated  10/6 Renal US: Negative  10/7 CT Abd/pelvis: Bilateral lower lobe pneumonia. Known Covid; Distended colon with fluid, air and stool  10/8 CXR: No acute findings      Treatment Plan of Care:   Remains on vent w/ETT on PCMV, Peep 6, FIO2 21%, rate 16, sats 95%. Tmax 100.69 NSR. Follows commands. Dietitian,  PT/OT signed off -   Respiratory culture + MRSA. Urine +yeast. Cardiac monitoring, I&O, daily weight, oral care, OG w/TF at goal, flexiseal, mason care. IVF, Levo @ 5 mcg/min, diprivan @ 30 mcg/kg/min, Allopurinol, abilify, asa, lipitor, celexa, IV doxycycline #3/85, DZCUAXV bid, folic acid, neurontin, inhaler, lantus, SSI ACHS, electrolyte replacement protocol, K+3.6. Barriers to Discharge: on vent  PCP: Robert Lane MD  Readmission Risk Score: 51%  Patient Goals/Plan/Treatment Preferences: patient from alone and current [facility] for RN/PT/OT.  SW on case. Plan pending course - probable TCU vs LTACH. Will need PT/OT when appropriate.

## 2020-10-10 ENCOUNTER — APPOINTMENT (OUTPATIENT)
Dept: GENERAL RADIOLOGY | Age: 50
DRG: 870 | End: 2020-10-10
Payer: MEDICARE

## 2020-10-10 LAB
ALBUMIN SERPL-MCNC: 2.5 G/DL (ref 3.5–5.1)
ALLEN TEST: POSITIVE
ALP BLD-CCNC: 92 U/L (ref 38–126)
ALT SERPL-CCNC: 29 U/L (ref 11–66)
ANION GAP SERPL CALCULATED.3IONS-SCNC: 16 MEQ/L (ref 8–16)
AST SERPL-CCNC: 32 U/L (ref 5–40)
BASE EXCESS (CALCULATED): -7.9 MMOL/L (ref -2.5–2.5)
BASOPHILS # BLD: 0.3 %
BASOPHILS ABSOLUTE: 0 THOU/MM3 (ref 0–0.1)
BILIRUB SERPL-MCNC: 0.3 MG/DL (ref 0.3–1.2)
BUN BLDV-MCNC: 38 MG/DL (ref 7–22)
CALCIUM SERPL-MCNC: 9.2 MG/DL (ref 8.5–10.5)
CHLORIDE BLD-SCNC: 111 MEQ/L (ref 98–111)
CO2: 16 MEQ/L (ref 23–33)
COLLECTED BY:: ABNORMAL
CREAT SERPL-MCNC: 2.7 MG/DL (ref 0.4–1.2)
DEVICE: ABNORMAL
EOSINOPHIL # BLD: 2.2 %
EOSINOPHILS ABSOLUTE: 0.3 THOU/MM3 (ref 0–0.4)
ERYTHROCYTE [DISTWIDTH] IN BLOOD BY AUTOMATED COUNT: 17.2 % (ref 11.5–14.5)
ERYTHROCYTE [DISTWIDTH] IN BLOOD BY AUTOMATED COUNT: 58.5 FL (ref 35–45)
GFR SERPL CREATININE-BSD FRML MDRD: 30 ML/MIN/1.73M2
GLUCOSE BLD-MCNC: 177 MG/DL (ref 70–108)
GLUCOSE BLD-MCNC: 191 MG/DL (ref 70–108)
GLUCOSE BLD-MCNC: 207 MG/DL (ref 70–108)
GLUCOSE BLD-MCNC: 216 MG/DL (ref 70–108)
GLUCOSE BLD-MCNC: 221 MG/DL (ref 70–108)
HCO3: 17 MMOL/L (ref 23–28)
HCT VFR BLD CALC: 26.5 % (ref 42–52)
HEMOGLOBIN: 8.3 GM/DL (ref 14–18)
IFIO2: 21
IMMATURE GRANS (ABS): 0.15 THOU/MM3 (ref 0–0.07)
IMMATURE GRANULOCYTES: 1 %
LACTIC ACID: 1.5 MMOL/L (ref 0.5–2.2)
LYMPHOCYTES # BLD: 6.6 %
LYMPHOCYTES ABSOLUTE: 1 THOU/MM3 (ref 1–4.8)
MCH RBC QN AUTO: 29.2 PG (ref 26–33)
MCHC RBC AUTO-ENTMCNC: 31.3 GM/DL (ref 32.2–35.5)
MCV RBC AUTO: 93.3 FL (ref 80–94)
MODE: ABNORMAL
MONOCYTES # BLD: 6.2 %
MONOCYTES ABSOLUTE: 1 THOU/MM3 (ref 0.4–1.3)
NUCLEATED RED BLOOD CELLS: 0 /100 WBC
O2 SATURATION: 90 %
ORGANISM: ABNORMAL
PCO2: 30 MMHG (ref 35–45)
PH BLOOD GAS: 7.35 (ref 7.35–7.45)
PIP: 32 CMH2O
PLATELET # BLD: 316 THOU/MM3 (ref 130–400)
PMV BLD AUTO: 11.2 FL (ref 9.4–12.4)
PO2: 59 MMHG (ref 71–104)
POTASSIUM SERPL-SCNC: 3.2 MEQ/L (ref 3.5–5.2)
POTASSIUM SERPL-SCNC: 3.4 MEQ/L (ref 3.5–5.2)
POTASSIUM SERPL-SCNC: 3.8 MEQ/L (ref 3.5–5.2)
PROCALCITONIN: 2.5 NG/ML (ref 0.01–0.09)
RBC # BLD: 2.84 MILL/MM3 (ref 4.7–6.1)
SEG NEUTROPHILS: 83.7 %
SEGMENTED NEUTROPHILS ABSOLUTE COUNT: 13.1 THOU/MM3 (ref 1.8–7.7)
SET PEEP: 6 MMHG
SET RESPIRATORY RATE: 16 BPM
SODIUM BLD-SCNC: 143 MEQ/L (ref 135–145)
SOURCE, BLOOD GAS: ABNORMAL
TOTAL PROTEIN: 6.2 G/DL (ref 6.1–8)
URINE CULTURE, ROUTINE: ABNORMAL
URINE CULTURE, ROUTINE: ABNORMAL
WBC # BLD: 15.7 THOU/MM3 (ref 4.8–10.8)

## 2020-10-10 PROCEDURE — 6360000002 HC RX W HCPCS: Performed by: INTERNAL MEDICINE

## 2020-10-10 PROCEDURE — 2500000003 HC RX 250 WO HCPCS: Performed by: INTERNAL MEDICINE

## 2020-10-10 PROCEDURE — 6360000002 HC RX W HCPCS: Performed by: NURSE PRACTITIONER

## 2020-10-10 PROCEDURE — 6360000002 HC RX W HCPCS: Performed by: STUDENT IN AN ORGANIZED HEALTH CARE EDUCATION/TRAINING PROGRAM

## 2020-10-10 PROCEDURE — 6370000000 HC RX 637 (ALT 250 FOR IP): Performed by: INTERNAL MEDICINE

## 2020-10-10 PROCEDURE — 6370000000 HC RX 637 (ALT 250 FOR IP): Performed by: HOSPITALIST

## 2020-10-10 PROCEDURE — 94640 AIRWAY INHALATION TREATMENT: CPT

## 2020-10-10 PROCEDURE — 71045 X-RAY EXAM CHEST 1 VIEW: CPT

## 2020-10-10 PROCEDURE — 85025 COMPLETE CBC W/AUTO DIFF WBC: CPT

## 2020-10-10 PROCEDURE — 94761 N-INVAS EAR/PLS OXIMETRY MLT: CPT

## 2020-10-10 PROCEDURE — 6370000000 HC RX 637 (ALT 250 FOR IP): Performed by: NURSE PRACTITIONER

## 2020-10-10 PROCEDURE — 99291 CRITICAL CARE FIRST HOUR: CPT | Performed by: INTERNAL MEDICINE

## 2020-10-10 PROCEDURE — 94770 HC ETCO2 MONITOR DAILY: CPT

## 2020-10-10 PROCEDURE — 94003 VENT MGMT INPAT SUBQ DAY: CPT

## 2020-10-10 PROCEDURE — 84132 ASSAY OF SERUM POTASSIUM: CPT

## 2020-10-10 PROCEDURE — 80053 COMPREHEN METABOLIC PANEL: CPT

## 2020-10-10 PROCEDURE — 2100000000 HC CCU R&B

## 2020-10-10 PROCEDURE — 82948 REAGENT STRIP/BLOOD GLUCOSE: CPT

## 2020-10-10 PROCEDURE — 2580000003 HC RX 258: Performed by: STUDENT IN AN ORGANIZED HEALTH CARE EDUCATION/TRAINING PROGRAM

## 2020-10-10 PROCEDURE — 82803 BLOOD GASES ANY COMBINATION: CPT

## 2020-10-10 PROCEDURE — 99233 SBSQ HOSP IP/OBS HIGH 50: CPT | Performed by: INTERNAL MEDICINE

## 2020-10-10 PROCEDURE — 84145 PROCALCITONIN (PCT): CPT

## 2020-10-10 PROCEDURE — 2580000003 HC RX 258: Performed by: NURSE PRACTITIONER

## 2020-10-10 PROCEDURE — 36600 WITHDRAWAL OF ARTERIAL BLOOD: CPT

## 2020-10-10 PROCEDURE — 36415 COLL VENOUS BLD VENIPUNCTURE: CPT

## 2020-10-10 PROCEDURE — 83605 ASSAY OF LACTIC ACID: CPT

## 2020-10-10 PROCEDURE — 2580000003 HC RX 258: Performed by: INTERNAL MEDICINE

## 2020-10-10 RX ORDER — 0.9 % SODIUM CHLORIDE 0.9 %
1000 INTRAVENOUS SOLUTION INTRAVENOUS ONCE
Status: COMPLETED | OUTPATIENT
Start: 2020-10-10 | End: 2020-10-10

## 2020-10-10 RX ADMIN — INSULIN LISPRO 4 UNITS: 100 INJECTION, SOLUTION INTRAVENOUS; SUBCUTANEOUS at 12:35

## 2020-10-10 RX ADMIN — GLYCOPYRROLATE AND FORMOTEROL FUMARATE 2 PUFF: 9; 4.8 AEROSOL, METERED RESPIRATORY (INHALATION) at 17:20

## 2020-10-10 RX ADMIN — GABAPENTIN 800 MG: 400 CAPSULE ORAL at 20:28

## 2020-10-10 RX ADMIN — FOLIC ACID 1 MG: 1 TABLET ORAL at 08:39

## 2020-10-10 RX ADMIN — PROPOFOL 25 MCG/KG/MIN: 10 INJECTION, EMULSION INTRAVENOUS at 10:48

## 2020-10-10 RX ADMIN — GLYCOPYRROLATE AND FORMOTEROL FUMARATE 2 PUFF: 9; 4.8 AEROSOL, METERED RESPIRATORY (INHALATION) at 08:05

## 2020-10-10 RX ADMIN — MIDODRINE HYDROCHLORIDE 5 MG: 5 TABLET ORAL at 08:39

## 2020-10-10 RX ADMIN — PIPERACILLIN AND TAZOBACTAM 3.38 G: 3; .375 INJECTION, POWDER, LYOPHILIZED, FOR SOLUTION INTRAVENOUS at 02:26

## 2020-10-10 RX ADMIN — DEXTROSE MONOHYDRATE 200 MG: 50 INJECTION, SOLUTION INTRAVENOUS at 15:03

## 2020-10-10 RX ADMIN — GABAPENTIN 800 MG: 400 CAPSULE ORAL at 08:39

## 2020-10-10 RX ADMIN — INSULIN LISPRO 2 UNITS: 100 INJECTION, SOLUTION INTRAVENOUS; SUBCUTANEOUS at 08:37

## 2020-10-10 RX ADMIN — PROPOFOL 25 MCG/KG/MIN: 10 INJECTION, EMULSION INTRAVENOUS at 20:04

## 2020-10-10 RX ADMIN — PROPOFOL 35 MCG/KG/MIN: 10 INJECTION, EMULSION INTRAVENOUS at 02:24

## 2020-10-10 RX ADMIN — ARIPIPRAZOLE 15 MG: 15 TABLET ORAL at 08:39

## 2020-10-10 RX ADMIN — POTASSIUM CHLORIDE 20 MEQ: 400 INJECTION, SOLUTION INTRAVENOUS at 13:40

## 2020-10-10 RX ADMIN — Medication 2000 UNITS: at 08:39

## 2020-10-10 RX ADMIN — DOXYCYCLINE 100 MG: 100 INJECTION, POWDER, LYOPHILIZED, FOR SOLUTION INTRAVENOUS at 22:12

## 2020-10-10 RX ADMIN — Medication 15 ML: at 08:39

## 2020-10-10 RX ADMIN — POTASSIUM CHLORIDE 20 MEQ: 400 INJECTION, SOLUTION INTRAVENOUS at 08:39

## 2020-10-10 RX ADMIN — POTASSIUM CHLORIDE 20 MEQ: 400 INJECTION, SOLUTION INTRAVENOUS at 06:53

## 2020-10-10 RX ADMIN — POTASSIUM CHLORIDE 20 MEQ: 400 INJECTION, SOLUTION INTRAVENOUS at 15:03

## 2020-10-10 RX ADMIN — ASPIRIN 81 MG: 81 TABLET, CHEWABLE ORAL at 08:39

## 2020-10-10 RX ADMIN — MIDODRINE HYDROCHLORIDE 5 MG: 5 TABLET ORAL at 16:42

## 2020-10-10 RX ADMIN — PIPERACILLIN AND TAZOBACTAM 3.38 G: 3; .375 INJECTION, POWDER, LYOPHILIZED, FOR SOLUTION INTRAVENOUS at 16:42

## 2020-10-10 RX ADMIN — THERA TABS 1 TABLET: TAB at 08:40

## 2020-10-10 RX ADMIN — ACETAMINOPHEN 650 MG: 325 TABLET ORAL at 05:36

## 2020-10-10 RX ADMIN — INSULIN LISPRO 4 UNITS: 100 INJECTION, SOLUTION INTRAVENOUS; SUBCUTANEOUS at 16:42

## 2020-10-10 RX ADMIN — INSULIN LISPRO 1 UNITS: 100 INJECTION, SOLUTION INTRAVENOUS; SUBCUTANEOUS at 20:25

## 2020-10-10 RX ADMIN — PANTOPRAZOLE SODIUM 40 MG: 40 TABLET, DELAYED RELEASE ORAL at 05:37

## 2020-10-10 RX ADMIN — Medication 1 CAPSULE: at 08:39

## 2020-10-10 RX ADMIN — MIDODRINE HYDROCHLORIDE 5 MG: 5 TABLET ORAL at 12:35

## 2020-10-10 RX ADMIN — PROPOFOL 30 MCG/KG/MIN: 10 INJECTION, EMULSION INTRAVENOUS at 06:12

## 2020-10-10 RX ADMIN — PIPERACILLIN AND TAZOBACTAM 3.38 G: 3; .375 INJECTION, POWDER, LYOPHILIZED, FOR SOLUTION INTRAVENOUS at 08:38

## 2020-10-10 RX ADMIN — INSULIN GLARGINE 38 UNITS: 100 INJECTION, SOLUTION SUBCUTANEOUS at 20:25

## 2020-10-10 RX ADMIN — POTASSIUM CHLORIDE AND SODIUM CHLORIDE: 900; 150 INJECTION, SOLUTION INTRAVENOUS at 07:54

## 2020-10-10 RX ADMIN — PROPOFOL 25 MCG/KG/MIN: 10 INJECTION, EMULSION INTRAVENOUS at 15:23

## 2020-10-10 RX ADMIN — Medication 15 ML: at 20:07

## 2020-10-10 RX ADMIN — CITALOPRAM 10 MG: 20 TABLET, FILM COATED ORAL at 08:39

## 2020-10-10 RX ADMIN — SODIUM CHLORIDE 1000 ML: 9 INJECTION, SOLUTION INTRAVENOUS at 15:03

## 2020-10-10 RX ADMIN — DOXYCYCLINE 100 MG: 100 INJECTION, POWDER, LYOPHILIZED, FOR SOLUTION INTRAVENOUS at 10:30

## 2020-10-10 RX ADMIN — ENOXAPARIN SODIUM 40 MG: 40 INJECTION SUBCUTANEOUS at 20:30

## 2020-10-10 RX ADMIN — ALLOPURINOL 200 MG: 100 TABLET ORAL at 08:39

## 2020-10-10 RX ADMIN — ATORVASTATIN CALCIUM 40 MG: 40 TABLET, FILM COATED ORAL at 20:07

## 2020-10-10 RX ADMIN — ENOXAPARIN SODIUM 40 MG: 40 INJECTION SUBCUTANEOUS at 08:38

## 2020-10-10 ASSESSMENT — PULMONARY FUNCTION TESTS
PIF_VALUE: 31
PIF_VALUE: 31
PIF_VALUE: 33
PIF_VALUE: 36
PIF_VALUE: 34
PIF_VALUE: 31

## 2020-10-10 NOTE — PLAN OF CARE
Problem: Impaired respiratory status  Goal: Patient will achieve/maintain normal respiratory rate/effort  Outcome: Ongoing     Patient continues on ventilator at this time, ventilator settings optimized to achieve target vt, rr, and oxygenation needs. Will attempt SBT when appropriate. Problem: Impaired respiratory status  Goal: Clear lung sounds  Outcome: Ongoing    Patient diminished with some rhonchi in upper lobes, will continue to suction as needed to help clear secretions.

## 2020-10-10 NOTE — PLAN OF CARE
Problem: Falls - Risk of:  Goal: Will remain free from falls  Description: Will remain free from falls  Outcome: Ongoing  Note: Patient is free from falls. Bed alarm on. Patient is resting comfortably at this time. Problem: Falls - Risk of:  Goal: Absence of physical injury  Outcome: Ongoing     Problem: Skin Integrity:  Goal: Will show no infection signs and symptoms  Description: Will show no infection signs and symptoms  Outcome: Ongoing  Note: Patient is being turned and repositioned at least every two hours. Problem: Skin Integrity:  Goal: Absence of new skin breakdown  Description: Absence of new skin breakdown  Outcome: Ongoing     Problem: Discharge Planning:  Goal: Discharged to appropriate level of care  Description: Discharged to appropriate level of care  Outcome: Ongoing  Note: No plans of d.c at this time. Problem: Urinary Retention:  Goal: Urinary elimination within specified parameters  Description: Urinary elimination within specified parameters  Outcome: Ongoing     Problem: Restraint Use - Nonviolent/Non-Self-Destructive Behavior:  Goal: Absence of restraint indications  Description: Absence of restraint indications  Outcome: Ongoing  Note: Restraints remain in place at this time.       Problem: Restraint Use - Nonviolent/Non-Self-Destructive Behavior:  Goal: Absence of restraint-related injury  Description: Absence of restraint-related injury  Outcome: Ongoing     Problem: Nutrition  Goal: Optimal nutrition therapy  Outcome: Ongoing     Problem: Impaired respiratory status  Goal: Patient will achieve/maintain normal respiratory rate/effort  10/10/2020 1058 by Yoanna Dotson RN  Outcome: Ongoing  10/10/2020 0555 by Pauly Lai RCP  Outcome: Ongoing     Problem: Impaired respiratory status  Goal: Clear lung sounds  10/10/2020 1058 by Yoanna Dotson RN  Outcome: Ongoing  10/10/2020 0555 by Pauly Lai RCP  Outcome: Ongoing     Problem: Serum Glucose Level - Abnormal:  Goal:

## 2020-10-10 NOTE — PROGRESS NOTES
[facility]         Renal Inpatient Follow-Up note         10/10/2020 9:41 AM    Pt Name:   Jamilah Clinton  YOB: 1970  Attending:   Beth Aly MD    Chief Complaint : Jamilah Clinton is a 44 y.o. male being followed by nephrology for acute kidney injury    Interval History :   Patient seen and examined by me. No distress  Intubated cannot accurately assess history or review of systems  Patient is only on 21% FiO2   Making decent urine output however appears to be tapered slightly  Having fever of almost 101. Still on low dose pressors .      Scheduled Medications :    lactobacillus  1 capsule Oral Daily with breakfast    enoxaparin  40 mg Subcutaneous BID    insulin glargine  38 Units Subcutaneous Nightly    citalopram  10 mg Oral Daily    insulin lispro  0-12 Units Subcutaneous TID WC    insulin lispro  0-6 Units Subcutaneous Nightly    doxycycline (VIBRAMYCIN) IV  100 mg Intravenous Q12H    chlorhexidine  15 mL Mouth/Throat BID    midodrine  5 mg Oral TID WC    lidocaine 1 % injection  5 mL Intradermal Once    sodium chloride flush  10 mL Intravenous 2 times per day    piperacillin-tazobactam  3.375 g Intravenous Q8H    multivitamin  1 tablet Oral Daily    pantoprazole  40 mg Oral QAM AC    magnesium replacement protocol   Other RX Placeholder    phosphorus replacement protocol   Other RX Placeholder    calcium replacement protocol   Other RX Placeholder    polyethylene glycol  17 g Oral Every Other Day    allopurinol  200 mg Oral Daily    ARIPiprazole  15 mg Oral Daily    aspirin  81 mg Oral Daily    atorvastatin  40 mg Oral Nightly    Vitamin D  2,000 Units Oral Daily    folic acid  1 mg Oral Daily    gabapentin  800 mg Oral BID    sodium chloride flush  10 mL Intravenous 2 times per day    glycopyrrolate-formoterol  2 puff Inhalation BID    senna  1 tablet Oral BID      0.9% NaCl with KCl 20 mEq 100 mL/hr at 10/10/20 0758    propofol 25 mcg/kg/min (10/10/20 0657)    norepinephrine 1 mcg/min (10/09/20 2118)    dextrose         Vitals :  BP (!) 95/52   Pulse 71   Temp 100.8 °F (38.2 °C) (Bladder)   Resp 19   Ht 5' 8\" (1.727 m)   Wt (!) 308 lb 10.3 oz (140 kg)   SpO2 100%   BMI 46.93 kg/m²     24HR INTAKE/OUTPUT:      Intake/Output Summary (Last 24 hours) at 10/10/2020 0941  Last data filed at 10/10/2020 0815  Gross per 24 hour   Intake 3577.14 ml   Output 1750 ml   Net 1827.14 ml     Last 3 weights  Wt Readings from Last 3 Encounters:   10/10/20 (!) 308 lb 10.3 oz (140 kg)   09/15/20 281 lb 6.4 oz (127.6 kg)   08/21/20 (!) 306 lb 6.4 oz (139 kg)           Physical Exam : Due to COVID-19 situation termination is limited exam from outside the room  General Appearance: Obese well-nourished no distress  Mouth/Throat: ET tube in place  CNS-he is more awake and alert  Psych-not agitated  Abdomen: Appears slightly distended  Musculoskeletal:  Edema -mild edema noted         Last 3 CBC   Recent Labs     10/08/20  0307 10/09/20  0520 10/10/20  0555   WBC 16.7* 11.3* 15.7*   RBC 2.90* 2.82* 2.84*   HGB 8.4* 8.0* 8.3*   HCT 26.4* 26.0* 26.5*    312 316     Last 3 CMP  Recent Labs     10/08/20  0307 10/09/20  0520 10/09/20  1300 10/10/20  0555    142  --  143   K 3.1* 3.1* 3.6 3.2*    111  --  111   CO2 19* 18*  --  16*   BUN 38* 40*  --  38*   CREATININE 3.5* 3.2*  --  2.7*   CALCIUM 8.7 8.9  --  9.2   LABALBU 2.6* 2.5*  --  2.5*   BILITOT 0.4 0.3  --  0.3             ASSESSMENT / Plan   1 Renal -acute kidney injury most likely due to septic ATN/hypotension  ? Does have slightly elevated vancomycin level but I doubt this has any impact on the renal function  ? Making some urine output and creatinine improving rather slowly currently at 2.7  ? Continue IV fluids  ?  No acute need for renal replacement therapy    2 Electrolytes -hypokalemia-patient is getting repleted also getting potassium through the IV fluids  3 Mild acidosis secondary to fluids follow for now. We will check an ABG  4 Anemia-stable  5 Hypotension mostly due to sepsis/volume depletion continue IV hydration and wean pressors to map greater than 65. Also on midodrine  6 Hx of diabetes mellitus  7 Hx of COVID-19 pneumonia  8 Ventilator dependent respiratory failure  9 Hx of diastolic dysfunction volume status reasonable closely follow  10 Meds reviewed and discussed with nursing staff    EMELY Sifuentes D.  [facility].

## 2020-10-10 NOTE — PROGRESS NOTES
Patient:  Holger Aguilar                    Unit/Bed:4B-04/004-A  LFF: [id number]            Osvaldo Emmanuel MD  Date of Admission: 9/23/2020     Assessment and Plan(All pulmonary edema, renal failure, PE, and respiratory failure diagnoses are acute in nature unless otherwise specified):        1. Acute hypoxemic respiratory failure: Patient extubated 10/2/2020.  On low-flow oxygen.  Although patient had ongoing radiographic improvement, he had progressive lethargy and subsequent obtundation.  Patient intubated 10/6/2020 for hypercarbic hypoxemic respiratory failure.  Continue with lung protection strategies targeting a peak pressure 35 or less and a plateau pressure of 30 or less. Patient at risk for requiring tracheostomy tube. 2. Acute lung injury: Oxygenating well. .  3. Acute renal failure: Patient has no IV contrast exposure.  Acute deterioration 10/6/2020.  Renal ultrasound was unimpressive.  Fractional excretion of sodium was less than 1 and was consistent with volume contraction.  Patient had hemodilution with volume resuscitation with a drop in hemoglobin of 2 g.  This also goes along with volume contraction. Pressor requirements have significantly diminished as patient volume resuscitated.  Appreciate nephrology's assistance. Urine output has improved with volume resuscitation. Creatinine improving. 4. Sepsis: Secondary to pneumonia and COVID-19.  Associated with tachypnea and pneumonia, initially. Pneumonia was secondary to MRSA and was treated with vancomycin.  As fever developed on 10/5/2020, vancomycin was discontinued and doxycycline was initiated.  Antibiotics also expanded to include Zosyn.  Both doxycycline and Zosyn were initiated on 10/6/2020.  Urinalysis showed greater than 200 white blood cells.  I suspect new onset fever is related to urinary tract infection. Sputum PCR shows persistent MRSA, which may represent colonization. Blood cultures are negative.   Patient has completed his course of Zosyn. However he still remains on doxycycline. Urine culture grew Candida glabrata. Will initiate antifungal treatment 10/10/20 for suspected candidemia pending the result of blood cultures and procalcitonin. 5. Hypotension: Status post pressors and volume resuscitation. Screen for adrenal insufficiency was negative.  Continues to require pressors.  Etiology is secondary to volume contraction and sepsis.  Multifactorial.  Continue with volume resuscitation.  As patient undergoes volume resuscitation, pressor requirements are diminishing.  Suspect this is primarily volume contraction and not septic shock. Continues to slowly improve. 6. Pneumonia: Secondary to MRSA.  Status post 8 days of vancomycin.  Patient developed new onset fever while on vancomycin.  Vancomycin was subsequently discontinued and doxycycline was initiated.  Radiographically, there is not much change.  As fever developed while on vancomycin, vancomycin was discontinued.  PCR of pulmonary lavage still showed MRSA within the sputum.  This may represent contamination.  However, patient will continue with doxycycline for a total of 10 days.  Day #5/10 doxycycline. 7. Anemia: Was dilutional..  8. Type 2 diabetes mellitus: Subcutaneous insulin. 9. Diastolic heart failure: On calcium channel blocker and diuretic.  Amlodipine on hold secondary to hypotension. 10. Obstructive sleep apnea: Secondary morbid obesity.  CPAP/BiPAP noncompliant. 11. Hyperlipidemia: On atorvastatin. 12. Hypertension:  Currently hypotensive.  Amlodipine discontinued. 13. Gout: On allopurinol.   15. COVID-19: Convalescent plasma received 9/24/2020.  Resolved.     CC: Respiratory failure  HPI: Patient is a 57-year-old morbidly obese black male lifetime non-smoker. Carly Haw has a history of morbid obesity associated with type 2 diabetes mellitus, prior alcohol abuse, hyperlipidemia, hypertension, hypogonadism, nonalcoholic steatohepatitis, obstructive sleep apnea, and gout.  Patient was hospitalized 9/6/2020 through 9/15/2020 with hypoxemic respiratory failure secondary to COVID-19 associated with diffuse bilateral infiltrates.  At that time, patient received Decadron, remdesivir, and danazol.  During hospitalization, he had issues with atrial fibrillation.  His insulin therapy required adjustment.  He was discharged home on subcutaneous Lovenox. Patient returned back to the emergency room on 9/23/2020 with increasing SOB and progressive hypoxia. CXR showed diffuse infiltrates.  Deteriorated and required intubation. Patient underwent bronchoscopy on 9/27/2020 which demonstrated no mucus production. Patient extubated 10/2/2020. Patient reintubated for progressive respiratory failure with progressive obtundation on 10/6/2020.  This was associated with acute oliguric renal failure.  Patient was intubated 10/6/2020, and underwent volume resuscitation.  Fractional excretion of sodium returned less than 1 which was consistent with prerenal azotemia.  After volume resuscitation, patient demonstrated that he was hemoconcentrated with a drop of 2 g in the hemoglobin.  With volume resuscitation, urine output did improve. After patient was intubated on 10/6/2020, he underwent pulmonary lavage which showed MRSA on the PCR but no other organism.  However, patient was found to have greater than 200 white blood cells in the urine.  Patient was treated with Zosyn and doxycycline.  Previously, patient had received vancomycin and developed fever while on vancomycin.  Therefore vancomycin was not continued. For further details, please review the assessment and plan. ROS:  Patient  sedated to comfort on mechanical ventilator. PMH:  Per HPI  SHX: Lifetime non-smoker  FHX: Positive for heart disease.   Allergies: PCN  Medications:     0.9% NaCl with KCl 20 mEq 100 mL/hr at 10/10/20 0754    propofol 25 mcg/kg/min (10/10/20 1048)    norepinephrine 1 mcg/min (10/09/20 4182)    dextrose        lactobacillus  1 capsule Oral Daily with breakfast    enoxaparin  40 mg Subcutaneous BID    insulin glargine  38 Units Subcutaneous Nightly    citalopram  10 mg Oral Daily    insulin lispro  0-12 Units Subcutaneous TID WC    insulin lispro  0-6 Units Subcutaneous Nightly    doxycycline (VIBRAMYCIN) IV  100 mg Intravenous Q12H    chlorhexidine  15 mL Mouth/Throat BID    midodrine  5 mg Oral TID WC    lidocaine 1 % injection  5 mL Intradermal Once    sodium chloride flush  10 mL Intravenous 2 times per day    piperacillin-tazobactam  3.375 g Intravenous Q8H    multivitamin  1 tablet Oral Daily    pantoprazole  40 mg Oral QAM AC    magnesium replacement protocol   Other RX Placeholder    phosphorus replacement protocol   Other RX Placeholder    calcium replacement protocol   Other RX Placeholder    polyethylene glycol  17 g Oral Every Other Day    allopurinol  200 mg Oral Daily    ARIPiprazole  15 mg Oral Daily    aspirin  81 mg Oral Daily    atorvastatin  40 mg Oral Nightly    Vitamin D  2,000 Units Oral Daily    folic acid  1 mg Oral Daily    gabapentin  800 mg Oral BID    sodium chloride flush  10 mL Intravenous 2 times per day    glycopyrrolate-formoterol  2 puff Inhalation BID    senna  1 tablet Oral BID     Vital Signs:   T: 99.9: P: 81: RR: 28: B/P: 113/65: FiO2: 30: O2 Sat: 100: I/O: 1375/475  GCS:  8:  Body mass index is 46.93 kg/m². [place] Confer General:   Morbidly obese black male. HEENT:  normocephalic and atraumatic.  No scleral icterus. PERR  Neck: supple.  No Thyromegaly. Lungs: Clear to anterior auscultation.  Respirations unlabored. Cardiac: RRR.  No JVD. Abdomen: soft.  Nontender.  Large panniculus. Extremities:  No clubbing, cyanosis x 4. No lower extremity edema bilaterally. Vasculature: capillary refill < 3 seconds. Palpable dorsalis pedis pulses. Skin:  warm and dry. Psych:    Sedated on mechanical ventilator. [place] Confer   Lymph:  No supraclavicular adenopathy. Neurologic:  No focal deficit. No seizures.     Data: (All radiographs, tracings, PFTs, and imaging are personally viewed and interpreted unless otherwise noted). · Telemetry shows sinus rhythm. · Echocardiogram shows an ejection fraction of 60%. .  · Sputum collected 9/23/2020: Positive for MRSA. · Sputum PCR collected 10/6/2020 is positive for MRSA. · Renal ultrasound report 10/6/2020 shows normal kidneys. · Urine culture collected 10/06/2020 grew Candida glabrata  · Sodium 143, potassium 3.4, chloride 111, bicarb 16, BUN 38, creatinine 2.7, glucose 216. White blood cell count 15.7, hemoglobin 8.3, platelets 710.

## 2020-10-10 NOTE — PLAN OF CARE
Problem: Impaired respiratory status  Goal: Patient will achieve/maintain normal respiratory rate/effort  10/10/2020 1827 by Kaila Villanueva, RCP  Outcome: Ongoing   Vent setting optimized to achieve target tidal volume, respiratory rate and ideal oxygen saturations. SBT will be performed when appropriate.          Problem: Impaired respiratory status  Goal: Clear lung sounds  10/10/2020 1827 by Kaila Villanueva, RCP  Outcome: Ongoing   Continue inhaler as ordered to improve breath sounds

## 2020-10-11 ENCOUNTER — APPOINTMENT (OUTPATIENT)
Dept: GENERAL RADIOLOGY | Age: 50
DRG: 870 | End: 2020-10-11
Payer: MEDICARE

## 2020-10-11 LAB
ALBUMIN SERPL-MCNC: 2.6 G/DL (ref 3.5–5.1)
ALP BLD-CCNC: 97 U/L (ref 38–126)
ALT SERPL-CCNC: 30 U/L (ref 11–66)
ANION GAP SERPL CALCULATED.3IONS-SCNC: 15 MEQ/L (ref 8–16)
AST SERPL-CCNC: 29 U/L (ref 5–40)
BASOPHILS # BLD: 0.2 %
BASOPHILS ABSOLUTE: 0 THOU/MM3 (ref 0–0.1)
BILIRUB SERPL-MCNC: 0.3 MG/DL (ref 0.3–1.2)
BUN BLDV-MCNC: 38 MG/DL (ref 7–22)
CALCIUM SERPL-MCNC: 9.6 MG/DL (ref 8.5–10.5)
CHLORIDE BLD-SCNC: 114 MEQ/L (ref 98–111)
CO2: 15 MEQ/L (ref 23–33)
CREAT SERPL-MCNC: 2.5 MG/DL (ref 0.4–1.2)
EOSINOPHIL # BLD: 2.9 %
EOSINOPHILS ABSOLUTE: 0.5 THOU/MM3 (ref 0–0.4)
ERYTHROCYTE [DISTWIDTH] IN BLOOD BY AUTOMATED COUNT: 17.9 % (ref 11.5–14.5)
ERYTHROCYTE [DISTWIDTH] IN BLOOD BY AUTOMATED COUNT: 61.5 FL (ref 35–45)
GFR SERPL CREATININE-BSD FRML MDRD: 33 ML/MIN/1.73M2
GLUCOSE BLD-MCNC: 168 MG/DL (ref 70–108)
GLUCOSE BLD-MCNC: 168 MG/DL (ref 70–108)
GLUCOSE BLD-MCNC: 173 MG/DL (ref 70–108)
GLUCOSE BLD-MCNC: 178 MG/DL (ref 70–108)
GLUCOSE BLD-MCNC: 182 MG/DL (ref 70–108)
HCT VFR BLD CALC: 28.3 % (ref 42–52)
HEMOGLOBIN: 8.6 GM/DL (ref 14–18)
IMMATURE GRANS (ABS): 0.19 THOU/MM3 (ref 0–0.07)
IMMATURE GRANULOCYTES: 1 %
LACTIC ACID: 0.6 MMOL/L (ref 0.5–2.2)
LYMPHOCYTES # BLD: 5.3 %
LYMPHOCYTES ABSOLUTE: 1 THOU/MM3 (ref 1–4.8)
MCH RBC QN AUTO: 29.1 PG (ref 26–33)
MCHC RBC AUTO-ENTMCNC: 30.4 GM/DL (ref 32.2–35.5)
MCV RBC AUTO: 95.6 FL (ref 80–94)
MONOCYTES # BLD: 5.2 %
MONOCYTES ABSOLUTE: 1 THOU/MM3 (ref 0.4–1.3)
NUCLEATED RED BLOOD CELLS: 0 /100 WBC
PLATELET # BLD: 328 THOU/MM3 (ref 130–400)
PMV BLD AUTO: 11 FL (ref 9.4–12.4)
POTASSIUM SERPL-SCNC: 3.8 MEQ/L (ref 3.5–5.2)
PROCALCITONIN: 1.59 NG/ML (ref 0.01–0.09)
RBC # BLD: 2.96 MILL/MM3 (ref 4.7–6.1)
SEG NEUTROPHILS: 85.4 %
SEGMENTED NEUTROPHILS ABSOLUTE COUNT: 16 THOU/MM3 (ref 1.8–7.7)
SODIUM BLD-SCNC: 144 MEQ/L (ref 135–145)
TOTAL PROTEIN: 6.4 G/DL (ref 6.1–8)
WBC # BLD: 18.7 THOU/MM3 (ref 4.8–10.8)

## 2020-10-11 PROCEDURE — 2100000000 HC CCU R&B

## 2020-10-11 PROCEDURE — 2700000000 HC OXYGEN THERAPY PER DAY

## 2020-10-11 PROCEDURE — 82948 REAGENT STRIP/BLOOD GLUCOSE: CPT

## 2020-10-11 PROCEDURE — 94770 HC ETCO2 MONITOR DAILY: CPT

## 2020-10-11 PROCEDURE — 6360000002 HC RX W HCPCS: Performed by: INTERNAL MEDICINE

## 2020-10-11 PROCEDURE — 94761 N-INVAS EAR/PLS OXIMETRY MLT: CPT

## 2020-10-11 PROCEDURE — 2580000003 HC RX 258: Performed by: INTERNAL MEDICINE

## 2020-10-11 PROCEDURE — 36415 COLL VENOUS BLD VENIPUNCTURE: CPT

## 2020-10-11 PROCEDURE — 94640 AIRWAY INHALATION TREATMENT: CPT

## 2020-10-11 PROCEDURE — 84145 PROCALCITONIN (PCT): CPT

## 2020-10-11 PROCEDURE — 2580000003 HC RX 258: Performed by: STUDENT IN AN ORGANIZED HEALTH CARE EDUCATION/TRAINING PROGRAM

## 2020-10-11 PROCEDURE — 2500000003 HC RX 250 WO HCPCS: Performed by: INTERNAL MEDICINE

## 2020-10-11 PROCEDURE — 99291 CRITICAL CARE FIRST HOUR: CPT | Performed by: INTERNAL MEDICINE

## 2020-10-11 PROCEDURE — 83605 ASSAY OF LACTIC ACID: CPT

## 2020-10-11 PROCEDURE — 6360000002 HC RX W HCPCS: Performed by: NURSE PRACTITIONER

## 2020-10-11 PROCEDURE — 94003 VENT MGMT INPAT SUBQ DAY: CPT

## 2020-10-11 PROCEDURE — 85025 COMPLETE CBC W/AUTO DIFF WBC: CPT

## 2020-10-11 PROCEDURE — 6370000000 HC RX 637 (ALT 250 FOR IP): Performed by: HOSPITALIST

## 2020-10-11 PROCEDURE — 80053 COMPREHEN METABOLIC PANEL: CPT

## 2020-10-11 PROCEDURE — 2580000003 HC RX 258: Performed by: NURSE PRACTITIONER

## 2020-10-11 PROCEDURE — 6370000000 HC RX 637 (ALT 250 FOR IP): Performed by: INTERNAL MEDICINE

## 2020-10-11 PROCEDURE — 6360000002 HC RX W HCPCS: Performed by: STUDENT IN AN ORGANIZED HEALTH CARE EDUCATION/TRAINING PROGRAM

## 2020-10-11 PROCEDURE — 71045 X-RAY EXAM CHEST 1 VIEW: CPT

## 2020-10-11 PROCEDURE — 99232 SBSQ HOSP IP/OBS MODERATE 35: CPT | Performed by: INTERNAL MEDICINE

## 2020-10-11 RX ADMIN — DEXTROSE MONOHYDRATE 100 MG: 50 INJECTION, SOLUTION INTRAVENOUS at 14:25

## 2020-10-11 RX ADMIN — GABAPENTIN 800 MG: 400 CAPSULE ORAL at 20:43

## 2020-10-11 RX ADMIN — CITALOPRAM 10 MG: 20 TABLET, FILM COATED ORAL at 09:33

## 2020-10-11 RX ADMIN — INSULIN GLARGINE 38 UNITS: 100 INJECTION, SOLUTION SUBCUTANEOUS at 21:34

## 2020-10-11 RX ADMIN — PANTOPRAZOLE SODIUM 40 MG: 40 TABLET, DELAYED RELEASE ORAL at 04:10

## 2020-10-11 RX ADMIN — PROPOFOL 25 MCG/KG/MIN: 10 INJECTION, EMULSION INTRAVENOUS at 01:36

## 2020-10-11 RX ADMIN — ENOXAPARIN SODIUM 40 MG: 40 INJECTION SUBCUTANEOUS at 08:28

## 2020-10-11 RX ADMIN — ALLOPURINOL 200 MG: 100 TABLET ORAL at 09:33

## 2020-10-11 RX ADMIN — THERA TABS 1 TABLET: TAB at 08:28

## 2020-10-11 RX ADMIN — GLYCOPYRROLATE AND FORMOTEROL FUMARATE 2 PUFF: 9; 4.8 AEROSOL, METERED RESPIRATORY (INHALATION) at 17:30

## 2020-10-11 RX ADMIN — Medication 15 ML: at 08:30

## 2020-10-11 RX ADMIN — INSULIN LISPRO 2 UNITS: 100 INJECTION, SOLUTION INTRAVENOUS; SUBCUTANEOUS at 17:15

## 2020-10-11 RX ADMIN — Medication 2000 UNITS: at 09:34

## 2020-10-11 RX ADMIN — GABAPENTIN 800 MG: 400 CAPSULE ORAL at 09:34

## 2020-10-11 RX ADMIN — PIPERACILLIN AND TAZOBACTAM 3.38 G: 3; .375 INJECTION, POWDER, LYOPHILIZED, FOR SOLUTION INTRAVENOUS at 08:28

## 2020-10-11 RX ADMIN — DOXYCYCLINE 100 MG: 100 INJECTION, POWDER, LYOPHILIZED, FOR SOLUTION INTRAVENOUS at 21:47

## 2020-10-11 RX ADMIN — GLYCOPYRROLATE AND FORMOTEROL FUMARATE 2 PUFF: 9; 4.8 AEROSOL, METERED RESPIRATORY (INHALATION) at 05:34

## 2020-10-11 RX ADMIN — ASPIRIN 81 MG: 81 TABLET, CHEWABLE ORAL at 08:28

## 2020-10-11 RX ADMIN — SODIUM CHLORIDE, PRESERVATIVE FREE 10 ML: 5 INJECTION INTRAVENOUS at 20:53

## 2020-10-11 RX ADMIN — PROPOFOL 20 MCG/KG/MIN: 10 INJECTION, EMULSION INTRAVENOUS at 17:23

## 2020-10-11 RX ADMIN — MIDODRINE HYDROCHLORIDE 5 MG: 5 TABLET ORAL at 16:40

## 2020-10-11 RX ADMIN — INSULIN LISPRO 1 UNITS: 100 INJECTION, SOLUTION INTRAVENOUS; SUBCUTANEOUS at 21:34

## 2020-10-11 RX ADMIN — INSULIN LISPRO 2 UNITS: 100 INJECTION, SOLUTION INTRAVENOUS; SUBCUTANEOUS at 08:40

## 2020-10-11 RX ADMIN — ENOXAPARIN SODIUM 40 MG: 40 INJECTION SUBCUTANEOUS at 20:44

## 2020-10-11 RX ADMIN — Medication 1 CAPSULE: at 09:34

## 2020-10-11 RX ADMIN — MIDODRINE HYDROCHLORIDE 5 MG: 5 TABLET ORAL at 12:02

## 2020-10-11 RX ADMIN — ARIPIPRAZOLE 15 MG: 15 TABLET ORAL at 09:34

## 2020-10-11 RX ADMIN — ATORVASTATIN CALCIUM 40 MG: 40 TABLET, FILM COATED ORAL at 22:44

## 2020-10-11 RX ADMIN — FOLIC ACID 1 MG: 1 TABLET ORAL at 09:34

## 2020-10-11 RX ADMIN — ACETAMINOPHEN 650 MG: 325 TABLET ORAL at 04:23

## 2020-10-11 RX ADMIN — Medication 15 ML: at 20:44

## 2020-10-11 RX ADMIN — PIPERACILLIN AND TAZOBACTAM 3.38 G: 3; .375 INJECTION, POWDER, LYOPHILIZED, FOR SOLUTION INTRAVENOUS at 17:15

## 2020-10-11 RX ADMIN — PROPOFOL 25 MCG/KG/MIN: 10 INJECTION, EMULSION INTRAVENOUS at 06:31

## 2020-10-11 RX ADMIN — PIPERACILLIN AND TAZOBACTAM 3.38 G: 3; .375 INJECTION, POWDER, LYOPHILIZED, FOR SOLUTION INTRAVENOUS at 01:35

## 2020-10-11 RX ADMIN — MIDODRINE HYDROCHLORIDE 5 MG: 5 TABLET ORAL at 09:34

## 2020-10-11 RX ADMIN — INSULIN LISPRO 2 UNITS: 100 INJECTION, SOLUTION INTRAVENOUS; SUBCUTANEOUS at 12:02

## 2020-10-11 RX ADMIN — DOXYCYCLINE 100 MG: 100 INJECTION, POWDER, LYOPHILIZED, FOR SOLUTION INTRAVENOUS at 09:32

## 2020-10-11 RX ADMIN — SODIUM CHLORIDE, PRESERVATIVE FREE 10 ML: 5 INJECTION INTRAVENOUS at 08:30

## 2020-10-11 RX ADMIN — PROPOFOL 20 MCG/KG/MIN: 10 INJECTION, EMULSION INTRAVENOUS at 12:12

## 2020-10-11 ASSESSMENT — PAIN SCALES - GENERAL
PAINLEVEL_OUTOF10: 0

## 2020-10-11 ASSESSMENT — PULMONARY FUNCTION TESTS
PIF_VALUE: 31
PIF_VALUE: 33
PIF_VALUE: 33
PIF_VALUE: 34
PIF_VALUE: 33
PIF_VALUE: 34

## 2020-10-11 NOTE — PROGRESS NOTES
[facility]         Renal Inpatient Follow-Up note         10/11/2020 11:30 AM    Pt Name:   Ela Feliciano  YOB: 1970  Attending:   Marsha Garcia MD    Chief Complaint : Ela Feliciano is a 44 y.o. male being followed by nephrology for acute kidney injury    Interval History :   Patient seen and examined by me. No distress  Intubated cannot accurately assess history or review of systems  Patient FiO2 is slightly higher at 30%  Making some urine and fluctuating blood pressures . Off pressors for now .       Scheduled Medications :    anidulafungin  100 mg Intravenous Q24H    lactobacillus  1 capsule Oral Daily with breakfast    enoxaparin  40 mg Subcutaneous BID    insulin glargine  38 Units Subcutaneous Nightly    citalopram  10 mg Oral Daily    insulin lispro  0-12 Units Subcutaneous TID WC    insulin lispro  0-6 Units Subcutaneous Nightly    doxycycline (VIBRAMYCIN) IV  100 mg Intravenous Q12H    chlorhexidine  15 mL Mouth/Throat BID    midodrine  5 mg Oral TID WC    lidocaine 1 % injection  5 mL Intradermal Once    sodium chloride flush  10 mL Intravenous 2 times per day    piperacillin-tazobactam  3.375 g Intravenous Q8H    multivitamin  1 tablet Oral Daily    pantoprazole  40 mg Oral QAM AC    magnesium replacement protocol   Other RX Placeholder    phosphorus replacement protocol   Other RX Placeholder    calcium replacement protocol   Other RX Placeholder    polyethylene glycol  17 g Oral Every Other Day    allopurinol  200 mg Oral Daily    ARIPiprazole  15 mg Oral Daily    aspirin  81 mg Oral Daily    atorvastatin  40 mg Oral Nightly    Vitamin D  2,000 Units Oral Daily    folic acid  1 mg Oral Daily    gabapentin  800 mg Oral BID    sodium chloride flush  10 mL Intravenous 2 times per day    glycopyrrolate-formoterol  2 puff Inhalation BID    senna  1 tablet Oral BID      propofol 25 mcg/kg/min (10/11/20 0631)    norepinephrine Stopped (10/10/20 1539)    dextrose         Vitals :  BP (!) 87/54   Pulse 67   Temp 99.5 °F (37.5 °C) (Bladder)   Resp 16   Ht 5' 8\" (1.727 m)   Wt (!) 315 lb 14.7 oz (143.3 kg)   SpO2 100%   BMI 48.04 kg/m²     24HR INTAKE/OUTPUT:      Intake/Output Summary (Last 24 hours) at 10/11/2020 1130  Last data filed at 10/11/2020 0525  Gross per 24 hour   Intake 3939.93 ml   Output 1525 ml   Net 2414.93 ml     Last 3 weights  Wt Readings from Last 3 Encounters:   10/11/20 (!) 315 lb 14.7 oz (143.3 kg)   09/15/20 281 lb 6.4 oz (127.6 kg)   08/21/20 (!) 306 lb 6.4 oz (139 kg)           Physical Exam : Due to COVID-19 situation termination is limited exam from outside the room  General Appearance: Obese well-nourished no distress  Mouth/Throat: ET tube in place  CNS- not much responsive  Psych-not agitated  Abdomen: Appears slightly distended  Musculoskeletal:  Edema -mild edema noted         Last 3 CBC   Recent Labs     10/09/20  0520 10/10/20  0555 10/11/20  0415   WBC 11.3* 15.7* 18.7*   RBC 2.82* 2.84* 2.96*   HGB 8.0* 8.3* 8.6*   HCT 26.0* 26.5* 28.3*    316 328     Last 3 CMP  Recent Labs     10/09/20  0520  10/10/20  0555 10/10/20  1300 10/10/20  2000 10/11/20  0415     --  143  --   --  144   K 3.1*   < > 3.2* 3.4* 3.8 3.8     --  111  --   --  114*   CO2 18*  --  16*  --   --  15*   BUN 40*  --  38*  --   --  38*   CREATININE 3.2*  --  2.7*  --   --  2.5*   CALCIUM 8.9  --  9.2  --   --  9.6   LABALBU 2.5*  --  2.5*  --   --  2.6*   BILITOT 0.3  --  0.3  --   --  0.3    < > = values in this interval not displayed. ASSESSMENT / Plan   1 Renal -acute kidney injury most likely due to septic ATN/hypotension  ? Does have slightly elevated vancomycin level but I doubt this has any impact on the renal function  ? Making some urine output and creatinine improving rather slowly currently at 2.5  ? Off IVF for now .  Monitor BMP .    2 Electrolytes -hypokalemia- much improved . 3 Metabolic acidosis with some respiratory alkalosis as well. PH 7.35  4 Anemia-stable  5 Hypotension mostly due to sepsis/volume depletion. Off pressors . increase midodrine to 10- TID  6 Hx of diabetes mellitus  7 Hx of COVID-19 pneumonia  8 Ventilator dependent respiratory failure - fluctuating FiO2  9 Hx of diastolic dysfunction volume status reasonable closely follow  10 Meds reviewed    Dr. Charma Kawasaki, M,D.  [facility].

## 2020-10-11 NOTE — PLAN OF CARE
Problem: Impaired respiratory status  Goal: Patient will achieve/maintain normal respiratory rate/effort  10/11/2020 0532 by Jonah Justice RCP  Outcome: Ongoing  Note: Vent setting optimized to achieve target tidal volume, respiratory rate and ideal oxygen saturations. Patient is currently on 32/6 Rate of 16 and 30%. Will continue to wean pressure as patient tolerates. SBT will be performed when appropriate.

## 2020-10-11 NOTE — PLAN OF CARE
Problem: Falls - Risk of:  Goal: Will remain free from falls  Description: Will remain free from falls  10/10/2020 2153 by Marty Lance RN  Outcome: Ongoing  Note: Patient absent of falls this shift. 10/10/2020 1058 by Kay Gambino RN  Outcome: Ongoing  Note: Patient is free from falls. Bed alarm on. Patient is resting comfortably at this time. Goal: Absence of physical injury  10/10/2020 2153 by Marty Lance RN  Outcome: Ongoing  Note: No physical injury to patient noted. 10/10/2020 1058 by Kay Gambino RN  Outcome: Ongoing     Problem: Skin Integrity:  Goal: Will show no infection signs and symptoms  Description: Will show no infection signs and symptoms  10/10/2020 2153 by Marty Lance RN  Outcome: Ongoing  Note: No signs of infection this shift. /64   Pulse 82   Temp 98.8 °F (37.1 °C) (Bladder)   Resp 20   Ht 5' 8\" (1.727 m)   Wt (!) 308 lb 10.3 oz (140 kg)   SpO2 100%   BMI 46.93 kg/m²     10/10/2020 1058 by Kay Gambino RN  Outcome: Ongoing  Note: Patient is being turned and repositioned at least every two hours. Goal: Absence of new skin breakdown  Description: Absence of new skin breakdown  10/10/2020 2153 by Marty Lance RN  Outcome: Ongoing  Note: Patient is absent of new skin breakdown this shift. 10/10/2020 1058 by Kay Gambino RN  Outcome: Ongoing     Problem: Discharge Planning:  Goal: Discharged to appropriate level of care  Description: Discharged to appropriate level of care  10/10/2020 2153 by Marty Lance RN  Outcome: Ongoing  Note: No discharge plans this shift. Return home with family. 10/10/2020 1058 by Kay Gambino RN  Outcome: Ongoing  Note: No plans of d.c at this time. Problem: Urinary Retention:  Goal: Urinary elimination within specified parameters  Description: Urinary elimination within specified parameters  10/10/2020 2153 by Marty Lance RN  Outcome: Ongoing  Note: No retention noted.   Patient has a patent urine mason in place. 10/10/2020 1058 by Jozef Bullock RN  Outcome: Ongoing     Problem: Restraint Use - Nonviolent/Non-Self-Destructive Behavior:  Goal: Absence of restraint indications  Description: Absence of restraint indications  10/10/2020 2153 by Taiwo Amaro RN  Outcome: Ongoing  Note: Patient still pulling at lines once sedation is reduced. 10/10/2020 1058 by Jozef Bullock RN  Outcome: Ongoing  Note: Restraints remain in place at this time. Goal: Absence of restraint-related injury  Description: Absence of restraint-related injury  10/10/2020 2153 by Taiwo Amaro RN  Outcome: Ongoing  Note: Patient is absent of related injury to restraints this shift. 10/10/2020 1058 by Jozef Bullock RN  Outcome: Ongoing     Problem: Nutrition  Goal: Optimal nutrition therapy  10/10/2020 2153 by Taiwo Amaro RN  Outcome: Ongoing  Note: Patient has optimal nutrition this shift. Patient is on Vital at goal.  10/10/2020 1058 by Jozef Bullock RN  Outcome: Ongoing     Problem: Impaired respiratory status  Goal: Patient will achieve/maintain normal respiratory rate/effort  10/10/2020 2153 by Taiwo Amaro RN  Outcome: Ongoing  Note: Patient is vented this shift supporting patient's ability to maintain a normal respiratory rate and effort. 10/10/2020 1827 by Raphael Ricks RCP  Outcome: Ongoing  10/10/2020 1058 by Jozef Bullock RN  Outcome: Ongoing  Goal: Clear lung sounds  10/10/2020 2153 by Taiwo Amaro RN  Outcome: Ongoing  10/10/2020 1827 by Raphael Ricks RCP  Outcome: Ongoing  10/10/2020 1058 by Jozef Bullock RN  Outcome: Ongoing     Problem: Serum Glucose Level - Abnormal:  Goal: Ability to maintain appropriate glucose levels will improve  Description: Ability to maintain appropriate glucose levels will improve  10/10/2020 2153 by Taiwo Amaro RN  Outcome: Ongoing  Note: Patient is receiving coverage per order.   10/10/2020 1058 by Jozef Bullock RN  Outcome: Ongoing     Care plan reviewed with patient. Patient non verbal of understanding of the plan of care and unable to contribute to goal setting.

## 2020-10-11 NOTE — PLAN OF CARE
Problem: Falls - Risk of:  Goal: Will remain free from falls  Description: Will remain free from falls  Outcome: Ongoing  Note: Fall risk wrist band and fall sign in place. Bed alarm on. Problem: Skin Integrity:  Goal: Will show no infection signs and symptoms  Description: Will show no infection signs and symptoms  Outcome: Ongoing  Note: Patient continues to be turned and repositioned every two hours. No new skin breakdown noted. Problem: Discharge Planning:  Goal: Discharged to appropriate level of care  Description: Discharged to appropriate level of care  Outcome: Ongoing  Note: Discharge planning is unclear at this time. Problem: Urinary Retention:  Goal: Urinary elimination within specified parameters  Description: Urinary elimination within specified parameters  Outcome: Ongoing  Note: Adequate urinary output noted. Problem: Restraint Use - Nonviolent/Non-Self-Destructive Behavior:  Goal: Absence of restraint indications  Description: Absence of restraint indications  Outcome: Ongoing  Note: Restraint use continues. Discontinuation criteria not met at this time. Problem: Impaired respiratory status  Goal: Patient will achieve/maintain normal respiratory rate/effort  10/11/2020 0532 by Vinec Shelton RCP  Outcome: Ongoing  Note: Vent setting optimized to achieve target tidal volume, respiratory rate and ideal oxygen saturations. Patient is currently on 32/6 Rate of 16 and 30%. Will continue to wean pressure as patient tolerates. SBT will be performed when appropriate. Problem: Serum Glucose Level - Abnormal:  Goal: Ability to maintain appropriate glucose levels will improve  Description: Ability to maintain appropriate glucose levels will improve  Outcome: Ongoing  Note: Chem stick and SSI coverage continues. Care plan reviewed with patient. Patient verbalizes understanding of the plan of care and contributes to goal setting.

## 2020-10-12 ENCOUNTER — APPOINTMENT (OUTPATIENT)
Dept: GENERAL RADIOLOGY | Age: 50
DRG: 870 | End: 2020-10-12
Payer: MEDICARE

## 2020-10-12 LAB
ACINETOBACTER CALCOACETICUS-BAUMANNII BY PCR: NOT DETECTED
ADENOVIRUS BY PCR: NOT DETECTED
ALBUMIN SERPL-MCNC: 2.8 G/DL (ref 3.5–5.1)
ALP BLD-CCNC: 105 U/L (ref 38–126)
ALT SERPL-CCNC: 32 U/L (ref 11–66)
ANION GAP SERPL CALCULATED.3IONS-SCNC: 15 MEQ/L (ref 8–16)
AST SERPL-CCNC: 28 U/L (ref 5–40)
BASOPHILS # BLD: 0.3 %
BASOPHILS ABSOLUTE: 0.1 THOU/MM3 (ref 0–0.1)
BILIRUB SERPL-MCNC: 0.4 MG/DL (ref 0.3–1.2)
BUN BLDV-MCNC: 42 MG/DL (ref 7–22)
CALCIUM SERPL-MCNC: 9.9 MG/DL (ref 8.5–10.5)
CHLAMYDIA PNEUMONIAE BY PCR: NOT DETECTED
CHLORIDE BLD-SCNC: 113 MEQ/L (ref 98–111)
CO2: 16 MEQ/L (ref 23–33)
CORONAVIRUS PCR: NOT DETECTED
CREAT SERPL-MCNC: 3 MG/DL (ref 0.4–1.2)
ENTEROBACTER CLOACAE COMPLEX BY PCR: NOT DETECTED
EOSINOPHIL # BLD: 4 %
EOSINOPHILS ABSOLUTE: 0.8 THOU/MM3 (ref 0–0.4)
ERYTHROCYTE [DISTWIDTH] IN BLOOD BY AUTOMATED COUNT: 18.3 % (ref 11.5–14.5)
ERYTHROCYTE [DISTWIDTH] IN BLOOD BY AUTOMATED COUNT: 63.3 FL (ref 35–45)
ESCHERICHIA COLI BY PCR: NOT DETECTED
GFR SERPL CREATININE-BSD FRML MDRD: 27 ML/MIN/1.73M2
GLOMERULAR BASEMENT MEMBRANE ANTIBODY: NORMAL
GLUCOSE BLD-MCNC: 155 MG/DL (ref 70–108)
GLUCOSE BLD-MCNC: 169 MG/DL (ref 70–108)
GLUCOSE BLD-MCNC: 176 MG/DL (ref 70–108)
GLUCOSE BLD-MCNC: 217 MG/DL (ref 70–108)
HAEMOPHILUS INFLUENZAE BY PCR: NOT DETECTED
HCT VFR BLD CALC: 28.4 % (ref 42–52)
HEMOGLOBIN: 8.5 GM/DL (ref 14–18)
IMMATURE GRANS (ABS): 0.24 THOU/MM3 (ref 0–0.07)
IMMATURE GRANULOCYTES: 1.2 %
INFLUENZA A BY PCR: NOT DETECTED
INFLUENZA B BY PCR: NOT DETECTED
KLEBSIELLA AEROGENES BY PCR: NOT DETECTED
KLEBSIELLA OXYTOCA BY PCR: NOT DETECTED
KLEBSIELLA PNEUMONIAE GROUP BY PCR: NOT DETECTED
LACTIC ACID: 1.9 MMOL/L (ref 0.5–2.2)
LEGIONELLA PNEUMOPHILIA BY PCR: NOT DETECTED
LYMPHOCYTES # BLD: 8.1 %
LYMPHOCYTES ABSOLUTE: 1.6 THOU/MM3 (ref 1–4.8)
MCH RBC QN AUTO: 28.8 PG (ref 26–33)
MCHC RBC AUTO-ENTMCNC: 29.9 GM/DL (ref 32.2–35.5)
MCV RBC AUTO: 96.3 FL (ref 80–94)
METAPNEUMOVIRUS BY PCR: NOT DETECTED
MONOCYTES # BLD: 5.8 %
MONOCYTES ABSOLUTE: 1.2 THOU/MM3 (ref 0.4–1.3)
MORAXELLA CATARRHALIS BY PCR: NOT DETECTED
MYCOPLASMA PNEUMONIAE BY PCR: NOT DETECTED
NUCLEATED RED BLOOD CELLS: 0 /100 WBC
PARAINFLUENZA VIRUS BY PCR: NOT DETECTED
PLATELET # BLD: 399 THOU/MM3 (ref 130–400)
PMV BLD AUTO: 11 FL (ref 9.4–12.4)
POTASSIUM SERPL-SCNC: 3.8 MEQ/L (ref 3.5–5.2)
PROCALCITONIN: 1.29 NG/ML (ref 0.01–0.09)
PROTEUS SPECIES BY PCR: NOT DETECTED
PSEUDOMONAS AERUGINOSA BY PCR: NOT DETECTED
RBC # BLD: 2.95 MILL/MM3 (ref 4.7–6.1)
RESISTANT GENE CTX-M BY PCR: NORMAL
RESISTANT GENE IMP BY PCR: NORMAL
RESISTANT GENE KPC BY PCR: NORMAL
RESISTANT GENE MECA/C & MREJ BY PCR: NORMAL
RESISTANT GENE NDM BY PCR: NORMAL
RESISTANT GENE OXA-48-LIKE BY PCR: NORMAL
RESISTANT GENE VIM BY PCR: NORMAL
RESPIRATORY SYNCYTIAL VIRUS BY PCR: NOT DETECTED
RHINOVIRUS ENTEROVIRUS PCR: NOT DETECTED
SEG NEUTROPHILS: 80.6 %
SEGMENTED NEUTROPHILS ABSOLUTE COUNT: 16 THOU/MM3 (ref 1.8–7.7)
SERRATIA MARCESCENS BY PCR: NOT DETECTED
SODIUM BLD-SCNC: 144 MEQ/L (ref 135–145)
SOURCE: NORMAL
SPECIMEN ACCEPTABILITY: NORMAL
STAPH AUREUS BY PCR: NOT DETECTED
STREP AGALACTIAE BY PCR: NOT DETECTED
STREP PNEUMONIAE BY PCR: NOT DETECTED
STREP PYOGENES BY PCR: NOT DETECTED
TOTAL PROTEIN: 6.6 G/DL (ref 6.1–8)
WBC # BLD: 19.9 THOU/MM3 (ref 4.8–10.8)

## 2020-10-12 PROCEDURE — 2580000003 HC RX 258: Performed by: INTERNAL MEDICINE

## 2020-10-12 PROCEDURE — 99232 SBSQ HOSP IP/OBS MODERATE 35: CPT | Performed by: INTERNAL MEDICINE

## 2020-10-12 PROCEDURE — 2500000003 HC RX 250 WO HCPCS: Performed by: INTERNAL MEDICINE

## 2020-10-12 PROCEDURE — 71045 X-RAY EXAM CHEST 1 VIEW: CPT

## 2020-10-12 PROCEDURE — 87147 CULTURE TYPE IMMUNOLOGIC: CPT

## 2020-10-12 PROCEDURE — 6360000002 HC RX W HCPCS: Performed by: NURSE PRACTITIONER

## 2020-10-12 PROCEDURE — 87541 LEGION PNEUMO DNA AMP PROB: CPT

## 2020-10-12 PROCEDURE — 87798 DETECT AGENT NOS DNA AMP: CPT

## 2020-10-12 PROCEDURE — 94003 VENT MGMT INPAT SUBQ DAY: CPT

## 2020-10-12 PROCEDURE — 2580000003 HC RX 258: Performed by: STUDENT IN AN ORGANIZED HEALTH CARE EDUCATION/TRAINING PROGRAM

## 2020-10-12 PROCEDURE — 2580000003 HC RX 258: Performed by: HOSPITALIST

## 2020-10-12 PROCEDURE — 89220 SPUTUM SPECIMEN COLLECTION: CPT

## 2020-10-12 PROCEDURE — 87077 CULTURE AEROBIC IDENTIFY: CPT

## 2020-10-12 PROCEDURE — 80053 COMPREHEN METABOLIC PANEL: CPT

## 2020-10-12 PROCEDURE — 85025 COMPLETE CBC W/AUTO DIFF WBC: CPT

## 2020-10-12 PROCEDURE — 36415 COLL VENOUS BLD VENIPUNCTURE: CPT

## 2020-10-12 PROCEDURE — 87205 SMEAR GRAM STAIN: CPT

## 2020-10-12 PROCEDURE — 6360000002 HC RX W HCPCS: Performed by: STUDENT IN AN ORGANIZED HEALTH CARE EDUCATION/TRAINING PROGRAM

## 2020-10-12 PROCEDURE — 94770 HC ETCO2 MONITOR DAILY: CPT

## 2020-10-12 PROCEDURE — 6360000002 HC RX W HCPCS: Performed by: INTERNAL MEDICINE

## 2020-10-12 PROCEDURE — 87486 CHLMYD PNEUM DNA AMP PROBE: CPT

## 2020-10-12 PROCEDURE — 99291 CRITICAL CARE FIRST HOUR: CPT | Performed by: INTERNAL MEDICINE

## 2020-10-12 PROCEDURE — 2580000003 HC RX 258: Performed by: NURSE PRACTITIONER

## 2020-10-12 PROCEDURE — 82948 REAGENT STRIP/BLOOD GLUCOSE: CPT

## 2020-10-12 PROCEDURE — 94761 N-INVAS EAR/PLS OXIMETRY MLT: CPT

## 2020-10-12 PROCEDURE — 87070 CULTURE OTHR SPECIMN AEROBIC: CPT

## 2020-10-12 PROCEDURE — 87581 M.PNEUMON DNA AMP PROBE: CPT

## 2020-10-12 PROCEDURE — C9113 INJ PANTOPRAZOLE SODIUM, VIA: HCPCS | Performed by: INTERNAL MEDICINE

## 2020-10-12 PROCEDURE — 6360000002 HC RX W HCPCS: Performed by: HOSPITALIST

## 2020-10-12 PROCEDURE — 94640 AIRWAY INHALATION TREATMENT: CPT

## 2020-10-12 PROCEDURE — 84145 PROCALCITONIN (PCT): CPT

## 2020-10-12 PROCEDURE — 87631 RESP VIRUS 3-5 TARGETS: CPT

## 2020-10-12 PROCEDURE — 6370000000 HC RX 637 (ALT 250 FOR IP): Performed by: INTERNAL MEDICINE

## 2020-10-12 PROCEDURE — 2100000000 HC CCU R&B

## 2020-10-12 PROCEDURE — 83605 ASSAY OF LACTIC ACID: CPT

## 2020-10-12 PROCEDURE — 51702 INSERT TEMP BLADDER CATH: CPT

## 2020-10-12 PROCEDURE — 2700000000 HC OXYGEN THERAPY PER DAY

## 2020-10-12 PROCEDURE — 87186 SC STD MICRODIL/AGAR DIL: CPT

## 2020-10-12 PROCEDURE — 6370000000 HC RX 637 (ALT 250 FOR IP): Performed by: HOSPITALIST

## 2020-10-12 RX ORDER — PANTOPRAZOLE SODIUM 40 MG/10ML
40 INJECTION, POWDER, LYOPHILIZED, FOR SOLUTION INTRAVENOUS DAILY
Status: DISCONTINUED | OUTPATIENT
Start: 2020-10-12 | End: 2020-10-19

## 2020-10-12 RX ORDER — MULTIVIT/FOLIC ACID/HERBAL 275 400-200/30
30 LIQUID (ML) ORAL DAILY
Status: DISCONTINUED | OUTPATIENT
Start: 2020-10-12 | End: 2020-10-12

## 2020-10-12 RX ADMIN — PANTOPRAZOLE SODIUM 40 MG: 40 INJECTION, POWDER, FOR SOLUTION INTRAVENOUS at 11:58

## 2020-10-12 RX ADMIN — ALLOPURINOL 200 MG: 100 TABLET ORAL at 12:07

## 2020-10-12 RX ADMIN — ALBUTEROL SULFATE 5 MG: 2.5 SOLUTION RESPIRATORY (INHALATION) at 10:24

## 2020-10-12 RX ADMIN — GLYCOPYRROLATE AND FORMOTEROL FUMARATE 2 PUFF: 9; 4.8 AEROSOL, METERED RESPIRATORY (INHALATION) at 21:10

## 2020-10-12 RX ADMIN — DEXTROSE MONOHYDRATE 100 MG: 50 INJECTION, SOLUTION INTRAVENOUS at 14:35

## 2020-10-12 RX ADMIN — ARIPIPRAZOLE 15 MG: 15 TABLET ORAL at 12:07

## 2020-10-12 RX ADMIN — MIDODRINE HYDROCHLORIDE 5 MG: 5 TABLET ORAL at 17:59

## 2020-10-12 RX ADMIN — FOLIC ACID 1 MG: 1 TABLET ORAL at 12:07

## 2020-10-12 RX ADMIN — PROPOFOL 30 MCG/KG/MIN: 10 INJECTION, EMULSION INTRAVENOUS at 15:37

## 2020-10-12 RX ADMIN — SODIUM CHLORIDE, PRESERVATIVE FREE 10 ML: 5 INJECTION INTRAVENOUS at 11:46

## 2020-10-12 RX ADMIN — PROPOFOL 25 MCG/KG/MIN: 10 INJECTION, EMULSION INTRAVENOUS at 11:44

## 2020-10-12 RX ADMIN — CITALOPRAM 10 MG: 20 TABLET, FILM COATED ORAL at 12:07

## 2020-10-12 RX ADMIN — POTASSIUM CHLORIDE: 2 INJECTION, SOLUTION, CONCENTRATE INTRAVENOUS at 11:59

## 2020-10-12 RX ADMIN — SODIUM CHLORIDE, PRESERVATIVE FREE 10 ML: 5 INJECTION INTRAVENOUS at 11:51

## 2020-10-12 RX ADMIN — Medication 30 ML: at 14:35

## 2020-10-12 RX ADMIN — Medication 1 CAPSULE: at 12:08

## 2020-10-12 RX ADMIN — INSULIN LISPRO 2 UNITS: 100 INJECTION, SOLUTION INTRAVENOUS; SUBCUTANEOUS at 11:59

## 2020-10-12 RX ADMIN — GABAPENTIN 800 MG: 400 CAPSULE ORAL at 12:06

## 2020-10-12 RX ADMIN — PROPOFOL 15 MCG/KG/MIN: 10 INJECTION, EMULSION INTRAVENOUS at 06:08

## 2020-10-12 RX ADMIN — INSULIN LISPRO 4 UNITS: 100 INJECTION, SOLUTION INTRAVENOUS; SUBCUTANEOUS at 17:49

## 2020-10-12 RX ADMIN — PIPERACILLIN AND TAZOBACTAM 3.38 G: 3; .375 INJECTION, POWDER, LYOPHILIZED, FOR SOLUTION INTRAVENOUS at 17:42

## 2020-10-12 RX ADMIN — INSULIN LISPRO 1 UNITS: 100 INJECTION, SOLUTION INTRAVENOUS; SUBCUTANEOUS at 21:47

## 2020-10-12 RX ADMIN — ATORVASTATIN CALCIUM 40 MG: 40 TABLET, FILM COATED ORAL at 21:45

## 2020-10-12 RX ADMIN — PROPOFOL 20 MCG/KG/MIN: 10 INJECTION, EMULSION INTRAVENOUS at 00:02

## 2020-10-12 RX ADMIN — Medication 15 ML: at 21:45

## 2020-10-12 RX ADMIN — DOXYCYCLINE 100 MG: 100 INJECTION, POWDER, LYOPHILIZED, FOR SOLUTION INTRAVENOUS at 11:45

## 2020-10-12 RX ADMIN — PIPERACILLIN AND TAZOBACTAM 3.38 G: 3; .375 INJECTION, POWDER, LYOPHILIZED, FOR SOLUTION INTRAVENOUS at 01:06

## 2020-10-12 RX ADMIN — GABAPENTIN 800 MG: 400 CAPSULE ORAL at 21:45

## 2020-10-12 RX ADMIN — PIPERACILLIN AND TAZOBACTAM 3.38 G: 3; .375 INJECTION, POWDER, LYOPHILIZED, FOR SOLUTION INTRAVENOUS at 11:44

## 2020-10-12 RX ADMIN — GLYCOPYRROLATE AND FORMOTEROL FUMARATE 2 PUFF: 9; 4.8 AEROSOL, METERED RESPIRATORY (INHALATION) at 10:18

## 2020-10-12 RX ADMIN — Medication 2000 UNITS: at 12:07

## 2020-10-12 RX ADMIN — MIDODRINE HYDROCHLORIDE 5 MG: 5 TABLET ORAL at 12:12

## 2020-10-12 RX ADMIN — DOXYCYCLINE 100 MG: 100 INJECTION, POWDER, LYOPHILIZED, FOR SOLUTION INTRAVENOUS at 21:45

## 2020-10-12 RX ADMIN — INSULIN GLARGINE 38 UNITS: 100 INJECTION, SOLUTION SUBCUTANEOUS at 21:47

## 2020-10-12 ASSESSMENT — PULMONARY FUNCTION TESTS
PIF_VALUE: 27
PIF_VALUE: 31
PIF_VALUE: 25

## 2020-10-12 ASSESSMENT — PAIN SCALES - GENERAL: PAINLEVEL_OUTOF10: 0

## 2020-10-12 NOTE — PLAN OF CARE
Problem: Falls - Risk of:  Goal: Will remain free from falls  Description: Will remain free from falls  10/12/2020 0231 by Olivia Chris RN  Outcome: Ongoing  Note: Fall precautions in place. Call light and bedside table within reach. Bed locked and in lowest position. Bed alarm on. Hourly rounding. Problem: Skin Integrity:  Goal: Absence of new skin breakdown  Description: Absence of new skin breakdown  Outcome: Ongoing  Note: No signs of new skin break down at this time. Problem: Discharge Planning:  Goal: Discharged to appropriate level of care  Description: Discharged to appropriate level of care  10/12/2020 0231 by Olivia Chris RN  Outcome: Ongoing     Problem: Urinary Retention:  Goal: Urinary elimination within specified parameters  Description: Urinary elimination within specified parameters  10/12/2020 0231 by Olivia Chris RN  Outcome: Ongoing  Note: Dimas draining adequate urinary output. Problem: Restraint Use - Nonviolent/Non-Self-Destructive Behavior:  Goal: Absence of restraint indications  Description: Absence of restraint indications  10/12/2020 0231 by Olivia Chris RN  Outcome: Ongoing     Problem: Nutrition  Goal: Optimal nutrition therapy  Outcome: Ongoing  Note: Patient remains on continuous tube feed at this time. Care plan reviewed with patient. Patient unable to verbalize understanding of the plan of care or contribute to goal setting. Reinforcement needed.

## 2020-10-12 NOTE — PROGRESS NOTES
appearing. HEENT:  normocephalic and atraumatic.  No scleral icterus. PERR  Neck: supple.  No Thyromegaly. Lungs: Clear to anterior auscultation.  Respirations unlabored. Cardiac: RRR.  No JVD. Abdomen: soft.  Nontender.  Large panniculus. Extremities:  No clubbing, cyanosis x 4. No lower extremity edema bilaterally. Vasculature: capillary refill < 3 seconds. Palpable dorsalis pedis pulses. Skin:  warm and dry. Psych:    Sedated on mechanical ventilator. Chu Fuentes Lymph:  No supraclavicular adenopathy. Neurologic:  No focal deficit.  No seizures  DATA:  CBC:   Lab Results   Component Value Date    WBC 18.7 10/11/2020    RBC 2.96 10/11/2020    RBC 4.55 04/15/2012    HGB 8.6 10/11/2020    HCT 28.3 10/11/2020    MCV 95.6 10/11/2020    RDW 19.9 04/06/2020     10/11/2020     CMP:    Lab Results   Component Value Date     10/11/2020    K 3.8 10/11/2020    K 4.6 09/07/2020     10/11/2020    CO2 15 10/11/2020    BUN 38 10/11/2020    CREATININE 2.5 10/11/2020    GFRAA >60 01/23/2019    AGRATIO 0.8 06/24/2018    LABGLOM 33 10/11/2020    GLUCOSE 178 10/11/2020    GLUCOSE 113 06/24/2018    PROT 6.4 10/11/2020    CALCIUM 9.6 10/11/2020    BILITOT 0.3 10/11/2020    ALKPHOS 97 10/11/2020    AST 29 10/11/2020    ALT 30 10/11/2020       KEY ISSUES/FINDINGS/ASSESSMENT AND PLAN:    Patient is a 47-year-old morbidly obese black male lifetime non-smoker. Carly Graham has a history of morbid obesity associated with type 2 diabetes mellitus, prior alcohol abuse, hyperlipidemia, hypertension, hypogonadism, nonalcoholic steatohepatitis, obstructive sleep apnea, and gout.  Patient was hospitalized 9/6/2020 through 9/15/2020 with hypoxemic respiratory failure secondary to COVID-19 associated with diffuse bilateral infiltrates.  At that time, patient received Decadron, remdesivir, and danazol.  During hospitalization, he had issues with atrial fibrillation.  His insulin therapy required adjustment.  He was discharged home on ultrasound was unimpressive.  Fractional excretion of sodium was less than 1 and was consistent with volume contraction.  Patient had hemodilution with volume resuscitation with a drop in hemoglobin of 2 g.  This also goes along with volume contraction. Pressor requirements have significantly diminished as patient volume resuscitated.  Appreciate nephrology's assistance. Urine output has improved with volume resuscitation.  Creatinine improving. 4. Sepsis: Secondary to pneumonia and COVID-19.  Associated with tachypnea and pneumonia, initially. Pneumonia was secondary to MRSA and was treated with vancomycin.  As fever developed on 10/5/2020, vancomycin was discontinued and doxycycline was initiated.  Antibiotics also expanded to include Zosyn.  Both doxycycline and Zosyn were initiated on 10/6/2020.  Urinalysis showed greater than 200 white blood cells.  I suspect new onset fever is related to urinary tract infection. Sputum PCR shows persistent MRSA, which may represent colonization. Blood cultures are negative. Patient has completed his course of Zosyn. However he still remains on doxycycline. Urine culture grew Candida glabrata. Will initiate antifungal treatment 10/10/20 for suspected candidemia pending the result of blood cultures and procalcitonin. 5. Hypotension: Status post pressors and volume resuscitation. Screen for adrenal insufficiency was negative.  Continues to require pressors.  Etiology is secondary to volume contraction and sepsis.  Multifactorial.  Continue with volume resuscitation.  As patient undergoes volume resuscitation, pressor requirements are diminishing.  Suspect this is primarily volume contraction and not septic shock. Continues to slowly improve.   6. Pneumonia: Secondary to MRSA.  Status post 8 days of vancomycin.  Patient developed new onset fever while on vancomycin.  Vancomycin was subsequently discontinued and doxycycline was initiated.  Radiographically, there is not much change.  As fever developed while on vancomycin, vancomycin was discontinued.  PCR of pulmonary lavage still showed MRSA within the sputum.  This may represent contamination.  However, patient will continue with doxycycline for a total of 10 days. 7. Anemia: Was dilutional..  8. Type 2 diabetes mellitus: Subcutaneous insulin. 9. Diastolic heart failure: On calcium channel blocker and diuretic.  Amlodipine on hold secondary to hypotension. 10. Obstructive sleep apnea: Secondary morbid obesity.  CPAP/BiPAP noncompliant. 11. Hyperlipidemia: On atorvastatin. 12. Hypertension:  Currently hypotensive.  Amlodipine discontinued. 13. Gout: On allopurinol. 14. COVID-19: Convalescent plasma received 9/24/2020.  Resolved.     Critical condition with guarded prognosis  CC TIME 33 MIN APART FROM PROCEDURES

## 2020-10-12 NOTE — PROGRESS NOTES
Pt turned with off-going RN. Visual rectal exam done and pt noted to have ulcerations that are with fresh blood. On exam, fresh sarahy blood was noted to ooze from the rectum. Water was released from rectal tube and the tube was discontinued. A massive clot expelled from rectum with coughing and copious amounts of orange-reddish-brown liquid stool was projectile from the rectum. Diprivan was increased to sedate pt.

## 2020-10-12 NOTE — PLAN OF CARE
Problem: Impaired respiratory status  Goal: Patient will achieve/maintain normal respiratory rate/effort  10/12/2020 1033 by Rebecca Meehan RCP  Outcome: Ongoing  Note: Vent setting optimized to achieve target tidal volume, respiratory rate and ideal oxygen saturations. SBT will be performed when appropriate, pt sedated at this time.

## 2020-10-12 NOTE — PROGRESS NOTES
Problem: Occupational Therapy Goal  Goal: Occupational Therapy Goal  Description  Goals to be met by: 11/6/19    Pt to be properly positioned 100% of time by family & staff  Pt will remain in quiet organized state for 50% of session  Pt will tolerate tactile stimulation with <50% signs of stress during 3 consecutive sessions  Pt eyes will remain open for 50% of session  Parents will demonstrate dev handling caregiving techniques while pt is calm & organized  Pt will tolerate prom to all 4 extremities with no tightness noted  Pt will bring hands to mouth & midline 5-7 times per session  Pt will suck pacifier with good suck & latch in prep for oral fdg        Pt will maintain head in midline with fair head control 3 times during session  Pt will nipple 100% of feeds with good suck & coordination    Pt will nipple with 100% of feeds with good latch & seal  Family will independently nipple pt with oral stimulation as needed  Family will be independent with hep for development stimulation   Outcome: Ongoing, Progressing   Pt demonstrating steady progress toward his goals.  POC remains appropriate.   AZEB Ch  2019     Comprehensive Nutrition Assessment    Type and Reason for Visit:  Reassess(TF management)    Nutrition Recommendations/Plan:   If able to use gut recommend restart EN of Vital 1.2 at 10 ml/hr  If bowel rest needed recommend start TPN: dose weight of 88 kg (current ideal weight) and start with 10 kcals/kg, 1.2 grams protein, and 30% lipids - monitor renal labs    Nutrition Assessment:  Pt. declining from a nutritional standpoint AEB NPO, EN stopped. Remains at risk for further nutritional compromise r/t admitted with acute respiratory failure with hypoxemia, +covid, morbidly obese, altered GI function (await GI consult), elevated renal labs, elevated Hgb A1C of 9.9, and underlying medical condition (hx: CAD, DM, GERD, Alcohol abuse, HLD, HTN, Vitamin D deficiency). Nutrition recommendations/interventions as per above. Malnutrition Assessment:  Malnutrition Status:  Insufficient data(+covid)    Context:  Acute Illness     Findings of the 6 clinical characteristics of malnutrition:  Energy Intake:  Mild decrease in energy intake (Comment)(good appetite or on EN at goal since admit)  Weight Loss:  (16# or 5% in 2 months)     Body Fat Loss:  Unable to assess(+covid patient)     Muscle Mass Loss:  Unable to assess(+covid)    Fluid Accumulation:  1 - Mild Extremities   Strength:  Not Performed    Estimated Daily Nutrient Needs:  Energy (kcal):  1324-6928 (30-32 kcals/kg IBW in active late phase); Weight Used for Energy Requirements:  Ideal(70 kg - ideal weight)     Protein (g):  ~140 gms (2/kgm IBW) as renal status allows; Weight Used for Protein Requirements:  Ideal(70 kg)        Fluid (ml/day):  per MD; Weight Used for Fluid Requirements:  (n/a)      Nutrition Related Findings:  +cvoid; reintubaed 10/6; MAP: 88. Patient was tolerating EN at 20 ml/hr with 505 ml in over the last 24 hours. BM x 2 noted on 10/10. Patient received convalscent plasma on 9/24.  Note patient had large blood clot on site of rectal tube, tube feeding was stopped, and GI has been consulted. Labs: BUN: 42, Creatinine: 3.0, POC: 168. Meds: diprivan, glycolax, senokot, zyloprim, eraxis, vibromycin, folic acid, lantus, humalog, culturelle, multivitamin, zosyn. Wounds:  Stage III, Pressure Injury(hip - per wound care RN healing as of 9/30)       Current Nutrition Therapies:    NPO  Additional Calorie Sources:   diprivan at 11.6 ml/hr provides 306 lipid kcals in 24 hours    Anthropometric Measures:  · Height: 5' 8\" (172.7 cm)  · Current Body Weight: 315 lb (142.9 kg)(10/11, non-pitting BUE edema, +1 BLE edema)   · Admission Body Weight: 285 lb (129.3 kg)(9/23/20, stated, +1 BLE and BUE edema)    · Usual Body Weight: 306 lb (138.8 kg)(EMR, 6/29/20[facility]  [facility]     · Ideal Body Weight: 154 lbs;    · BMI: 47.9  · Adjusted Body Weight:  ; No Adjustment   · BMI Categories: Obese Class 3 (BMI 40.0 or greater)(48.04)       Nutrition Diagnosis:   · Inadequate oral intake related to impaired respiratory function as evidenced by NPO or clear liquid status due to medical condition, intubation    Nutrition Interventions:   Food and/or Nutrient Delivery:  Continue NPO(restart EN if able to use gut, if bowel rest needed recommend TPN)  Nutrition Education/Counseling:  No recommendation at this time   Coordination of Nutrition Care:  Continued Inpatient Monitoring, Interdisciplinary Rounds    Goals:  Patient will tolerate EN adequate for active late phase covid infection       Nutrition Monitoring and Evaluation:   Behavioral-Environmental Outcomes:  (n/a)   Food/Nutrient Intake Outcomes: Other (Comment)(EN vs PN)  Physical Signs/Symptoms Outcomes:  Biochemical Data, GI Status, Fluid Status or Edema, Hemodynamic Status, Skin, Weight     Discharge Planning:     Too soon to determine     Electronically signed by Gary Epstein, KURT, LD on 10/12/20 at 1:35 PM EDT    Contact: [phone]

## 2020-10-12 NOTE — CARE COORDINATION
10/12/20, 4:41 PM EDT    DISCHARGE ON GOING [address] day: 19  Location: -06/006-A Reason for admit: Acute respiratory failure with hypoxemia ([facility] [address] [J96.01]  Acute respiratory failure with hypoxia ([facility] [address] [J96.01]   Procedure:   9/23 CXR: Bilateral pulmonary opacification worse than on previous study dated 10 September 2020. This may represent worsening inflammatory process, possibly Covid 19 infection; borderline cardiomegaly  9/23 Intubated  9/24 CVC left subclavian - 9/30 removed  4/35 PICC right basilic  57/8 Extubated to bipap  10/6 Reintubated  10/6 Renal US: Negative  10/7 CT Abd/pelvis: Bilateral lower lobe pneumonia. Known Covid; Distended colon with fluid, air and stool  10/8 CXR: No acute findings  10/11  CXR -  Similar bilateral ill-defined pneumonia. 10/12 CXR-Progressive bilateral consolidations or ARDS. Treatment Plan of Care: GI consulted for rectal bleeding declines to see due to Covid, remains on vent w/ETT on PCMV, Peep 6, FIO2 30%, rate 16, sats 99%. Tmax 99.3, NSR, Dietitian,  PT/OT signed off -   Respiratory culture + MRSA. Urine +yeast. Cardiac monitoring, I&O, daily weight, oral care, OG w/TF at goal, flexiseal, mason care.  IVF  Barriers to Discharge: not med ready  PCP: Viola Tobin MD  Readmission Risk Score: 52%  Patient Goals/Plan/Treatment Preferences:  from alone and current \[facility]\"" - [facility] for RN/PT/OT.  SW on case. Plan pending course - probable TCU vs LTACH. Will need PT/OT when appropriate.

## 2020-10-12 NOTE — PROGRESS NOTES
Spoke with Dr. Wilma Mcdaniels re: rectal bleeding consult. He states that they will not see the pt in the Covid unit unless they have active bleeding and feels this bleeding will resolve itself with the rectal tube being discontinued and not giving anticoagulants. He states that tube feeds should remain as trickle feeds.

## 2020-10-12 NOTE — CONSULTS
and COVID-19.  Associated with tachypnea and pneumonia, initially. Pneumonia was secondary to MRSA and was treated with vancomycin.  As fever developed on 10/5/2020, vancomycin was discontinued and doxycycline was initiated.  Antibiotics also expanded to include Zosyn.  Both doxycycline and Zosyn were initiated on 10/6/2020.  Urinalysis showed greater than 200 white blood cells.  I suspect new onset fever is related to urinary tract infection. Sputum PCR shows persistent MRSA, which may represent colonization. Blood cultures are negative. 7. Hypotension: Status post pressors and volume resuscitation. Screen for adrenal insufficiency was negative.  Continues to require pressors.  Etiology is secondary to volume contraction and sepsis.  Multifactorial.  Continue with volume resuscitation.  As patient undergoes volume resuscitation. Suspected this is primarily volume contraction and not septic shock. Continues to slowly improve. 8. Pneumonia: Secondary to MRSA.  Status post 8 days of vancomycin.  Patient developed new onset fever while on vancomycin.  Vancomycin was subsequently discontinued and doxycycline was initiated.  Radiographically, there is not much change.  As fever developed while on vancomycin, vancomycin was discontinued.  PCR of pulmonary lavage still showed MRSA within the sputum.  This may represent contamination.  However, patient will continue with doxycycline for a total of 10 days.  Day #3/10 doxycycline. 9.        Type 2 diabetes mellitus: Subcutaneous insulin. 10. Diastolic heart failure: On calcium channel blocker and diuretic.  Amlodipine on hold secondary to hypotension. 11. Obstructive sleep apnea: Secondary morbid obesity.  CPAP/BiPAP noncompliant. 12. Hyperlipidemia: On atorvastatin. 13. Hypertension:  Currently hypotensive.  Amlodipine discontinued. 14. Gout: On allopurinol. 15. COVID-19: Convalescent plasma received 9/24/2020.  Resolved.     PLAN:   1. Supportive care.    2. Call does not have any pertinent problems on file. PAST MEDICAL HISTORY     has a past medical history of Arthritis, Atrial fibrillation ([facility] [address] BPH (benign prostatic hyperplasia), CAD (coronary artery disease), Carpal tunnel syndrome on right, Chronic gout, DM2 (diabetes mellitus, type 2) ([facility] [address] GERD (gastroesophageal reflux disease), Heart failure with preserved ejection fraction ([facility] [address] History of alcohol abuse, History of osteomyelitis, Hyperlipidemia, Hypertension, essential, Hypogonadism, male, Major depression, Mood disorder ([facility] [address] [facility] Muscle weakness, DURAN (nonalcoholic steatohepatitis), Obstructive sleep apnea treated with continuous positive airway pressure (CPAP), KIARA (obstructive sleep apnea), and Vitamin D deficiency. PAST SURGICAL HISTORY      has a past surgical history that includes tracheostomy; Foot surgery (Right); and hip surgery (Left, 6/29/2020). HOME MEDICATIONS      Prior to Admission medications    Medication Sig Start Date End Date Taking? Authorizing Provider   vitamin C (ASCORBIC ACID) 500 MG tablet Take 2 tablets by mouth daily 9/15/20   SANDRA Quintero CNP   enoxaparin (LOVENOX) 120 MG/0.8ML injection Inject 0.87 mLs into the skin every 12 hours 9/15/20   SANDRA Quintero CNP   aspirin 81 MG chewable tablet Take 1 tablet by mouth daily 9/16/20   SANDRA Quintero CNP   CHOLECALCIFEROL PO Take 2,000 Units by mouth daily    Historical Provider, MD   atorvastatin (LIPITOR) 40 MG tablet Take 40 mg by mouth nightly    Historical Provider, MD   acetaminophen (TYLENOL) 325 MG tablet Take 650 mg by mouth every 4 hours as needed for Pain    Historical Provider, MD   benzocaine (BABY ORAJEL) 7.5 % oral gel Take by mouth 4 times daily as needed for Pain (mouth pain) Take by mouth 3 times daily.     Historical Provider, MD   guaiFENesin (ROBITUSSIN) 100 MG/5ML syrup Take 200 mg by mouth every 4 hours as needed for Cough    Historical Provider, MD oxyCODONE-acetaminophen (PERCOCET) 5-325 MG per tablet Take 1 tablet by mouth every 4 hours as needed for Pain.      Historical Provider, MD   busPIRone (BUSPAR) 10 MG tablet Take 10 mg by mouth 2 times daily     Historical Provider, MD   Trolamine Salicylate (ASPERCREME) 10 % LOTN Apply topically as needed for Pain 5/22/20   Alesha Banerjee MD   pantoprazole (PROTONIX) 40 MG tablet Take 1 tablet by mouth daily 5/22/20   Alesha Banerjee MD   allopurinol (ZYLOPRIM) 300 MG tablet Take 1 tablet by mouth daily  Patient taking differently: Take 500 mg by mouth daily  4/9/20   [place] Gails, DO   allopurinol (ZYLOPRIM) 100 MG tablet Take 2 tablets by mouth daily 4/9/20   Trayton B Gregg,    lidocaine (XYLOCAINE) 5 % ointment Apply topically as needed to painful areas on lower back, hips, knees, and feet 3/11/20   SANDRA Quintanilla - CNP   esomeprazole Magnesium (NEXIUM) 20 MG PACK Take 20 mg by mouth daily    Historical Provider, MD   hydrALAZINE (APRESOLINE) 50 MG tablet Take 50 mg by mouth 2 times daily     Historical Provider, MD   Wound Dressings (MAXORB EX) Apply topically    Historical Provider, MD   polyethylene glycol (GLYCOLAX) powder Take 17 g by mouth every other day     Historical Provider, MD   Multiple Vitamins-Minerals (THERAPEUTIC MULTIVITAMIN-MINERALS) tablet Take 1 tablet by mouth daily    Historical Provider, MD   Diaper Rash Products (PINXAV) OINT Apply topically    Historical Provider, MD   Probiotic Product (SOBIA-BID PROBIOTIC PO) Take 1 tablet by mouth daily     Historical Provider, MD   ondansetron (ZOFRAN) 4 MG tablet Take 4 mg by mouth every 8 hours as needed for Nausea or Vomiting    Historical Provider, MD   diltiazem (CARDIZEM) 60 MG tablet Take 60 mg by mouth 2 times daily    Historical Provider, MD   tamsulosin (FLOMAX) 0.4 MG capsule Take 0.4 mg by mouth nightly     Historical Provider, MD   folic acid (FOLVITE) 1 MG tablet Take 1 mg by mouth daily    Historical Provider, MD furosemide (LASIX) 40 MG tablet Take 40 mg by mouth daily    Historical Provider, MD   gabapentin (NEURONTIN) 400 MG capsule Take 800 mg by mouth 2 times daily. Historical Provider, MD   insulin lispro (HUMALOG) 100 UNIT/ML injection vial Inject into the skin 3 times daily (before meals) Per scale: 151-200=1 unit, 201-250=2 units, 251-300-3 units, 301-350=4 units, 351-400=5 units. Historical Provider, MD   sitaGLIPtan-metformin (JANUMET)  MG per tablet Take 1 tablet by mouth 2 times daily (with meals)    Historical Provider, MD   insulin glargine (LANTUS) 100 UNIT/ML injection vial Inject 10 Units into the skin nightly     Historical Provider, MD   senna (SENOKOT) 8.6 MG tablet Take 1 tablet by mouth 2 times daily    Historical Provider, MD   diazepam (VALIUM) 5 MG tablet Take 5 mg by mouth every 6 hours as needed for Anxiety. Historical Provider, MD   ARIPiprazole (ABILIFY PO) Take 15 mg by mouth daily     Historical Provider, MD   amlodipine (NORVASC) 10 MG tablet Take 5 mg by mouth daily     Historical Provider, MD   Citalopram Hydrobromide (CELEXA PO) Take 30 mg by mouth daily From [employer]     Historical Provider, MD   Blood Glucose Monitoring Suppl (FREESTYLE LITE) ARTIE Patient needs all supplies for qd testing.  DX: 250.00 8/18/11   Arlene Jalloh MD       MEDICATIONS    Scheduled Meds:   pantoprazole  40 mg Intravenous Daily    CertaVite/Antioxidants  30 mL Oral Daily    anidulafungin  100 mg Intravenous Q24H    lactobacillus  1 capsule Oral Daily with breakfast    enoxaparin  40 mg Subcutaneous BID    insulin glargine  38 Units Subcutaneous Nightly    citalopram  10 mg Oral Daily    insulin lispro  0-12 Units Subcutaneous TID WC    insulin lispro  0-6 Units Subcutaneous Nightly    doxycycline (VIBRAMYCIN) IV  100 mg Intravenous Q12H    chlorhexidine  15 mL Mouth/Throat BID    midodrine  5 mg Oral TID WC    lidocaine 1 % injection  5 mL None     Gets together: None     Attends Cheondoism service: None     Active member of club or organization: None     Attends meetings of clubs or organizations: None     Relationship status: None    Intimate partner violence     Fear of current or ex partner: None     Emotionally abused: None     Physically abused: None     Forced sexual activity: None   Other Topics Concern    None   Social History Narrative    None       FAMILY HISTORY    family history includes Cancer in his paternal aunt; Heart Disease in his mother. PHYSICAL EXAMINATION:     height is 5' 8\" (1.727 m) and weight is 315 lb 14.7 oz (143.3 kg) (abnormal). His bladder temperature is 99.3 °F (37.4 °C). His blood pressure is 119/69 and his pulse is 80. His respiration is 14 and oxygen saturation is 100%. REVIEW OF DIAGNOSTIC TESTING AND LABS:      RADIOLOGY:      LABS:    CBC:   Recent Labs     10/10/20  0555 10/11/20  0415 10/12/20  0500   WBC 15.7* 18.7* 19.9*   HGB 8.3* 8.6* 8.5*    328 399     BMP:    Recent Labs     10/10/20  0555  10/10/20  2000 10/11/20  0415 10/12/20  0500     --   --  144 144   K 3.2*   < > 3.8 3.8 3.8     --   --  114* 113*   CO2 16*  --   --  15* 16*   BUN 38*  --   --  38* 42*   CREATININE 2.7*  --   --  2.5* 3.0*   GLUCOSE 216*  --   --  178* 155*    < > = values in this interval not displayed. Hepatic:   Recent Labs     10/10/20  0555 10/11/20  0415 10/12/20  0500   ALKPHOS 92 97 105   ALT 29 30 32   AST 32 29 28   PROT 6.2 6.4 6.6   BILITOT 0.3 0.3 0.4   LABALBU 2.5* 2.6* 2.8*     Amylase and Lipase:No results for input(s): LIPASE, AMYLASE in the last 72 hours.   Lactic Acid:   Recent Labs     10/12/20  0500   LACTA 1.9     Calcium:  Recent Labs     10/12/20  0500   CALCIUM 9.9         Ania Medina MD, [facility]

## 2020-10-12 NOTE — PROGRESS NOTES
[facility]         Renal Inpatient Follow-Up note         10/12/2020 8:40 AM    Pt Name:   Mark Meraz  YOB: 1970  Attending:   Hardy Anderson MD    Chief Complaint : Mark Meraz is a 44 y.o. male being followed by nephrology for acute kidney injury    Interval History :   Patient seen and examined by me. No distress  Intubated cannot accurately assess history or review of systems  Patient FiO2 is slightly higher at 30%  Making some urine and fluctuating blood pressures .    Had a large clot at the site of the rectal tube     Scheduled Medications :    anidulafungin  100 mg Intravenous Q24H    lactobacillus  1 capsule Oral Daily with breakfast    enoxaparin  40 mg Subcutaneous BID    insulin glargine  38 Units Subcutaneous Nightly    citalopram  10 mg Oral Daily    insulin lispro  0-12 Units Subcutaneous TID WC    insulin lispro  0-6 Units Subcutaneous Nightly    doxycycline (VIBRAMYCIN) IV  100 mg Intravenous Q12H    chlorhexidine  15 mL Mouth/Throat BID    midodrine  5 mg Oral TID WC    lidocaine 1 % injection  5 mL Intradermal Once    sodium chloride flush  10 mL Intravenous 2 times per day    piperacillin-tazobactam  3.375 g Intravenous Q8H    multivitamin  1 tablet Oral Daily    pantoprazole  40 mg Oral QAM AC    magnesium replacement protocol   Other RX Placeholder    phosphorus replacement protocol   Other RX Placeholder    calcium replacement protocol   Other RX Placeholder    polyethylene glycol  17 g Oral Every Other Day    allopurinol  200 mg Oral Daily    ARIPiprazole  15 mg Oral Daily    aspirin  81 mg Oral Daily    atorvastatin  40 mg Oral Nightly    Vitamin D  2,000 Units Oral Daily    folic acid  1 mg Oral Daily    gabapentin  800 mg Oral BID    sodium chloride flush  10 mL Intravenous 2 times per day    glycopyrrolate-formoterol  2 puff Inhalation BID    senna  1 tablet Oral BID      propofol 15 mcg/kg/min (10/12/20 6935)    norepinephrine Stopped (10/10/20 6959)    dextrose         Vitals :  /80   Pulse 84   Temp 99 °F (37.2 °C) (Bladder)   Resp 22   Ht 5' 8\" (1.727 m)   Wt (!) 315 lb 14.7 oz (143.3 kg)   SpO2 100%   BMI 48.04 kg/m²     24HR INTAKE/OUTPUT:      Intake/Output Summary (Last 24 hours) at 10/12/2020 0840  Last data filed at 10/12/2020 0359  Gross per 24 hour   Intake 1537.93 ml   Output 1150 ml   Net 387.93 ml     Last 3 weights  Wt Readings from Last 3 Encounters:   10/11/20 (!) 315 lb 14.7 oz (143.3 kg)   09/15/20 281 lb 6.4 oz (127.6 kg)   08/21/20 (!) 306 lb 6.4 oz (139 kg)           Physical Exam : Due to COVID-19 situation termination is limited exam from outside the room  General Appearance: Obese well-nourished no distress  Mouth/Throat: ET tube in place  CNS- not much responsive  Psych-not agitated  Abdomen: Appears slightly distended  Musculoskeletal:  Edema -mild edema noted         Last 3 CBC   Recent Labs     10/10/20  0555 10/11/20  0415 10/12/20  0500   WBC 15.7* 18.7* 19.9*   RBC 2.84* 2.96* 2.95*   HGB 8.3* 8.6* 8.5*   HCT 26.5* 28.3* 28.4*    328 399     Last 3 CMP  Recent Labs     10/10/20  0555  10/10/20  2000 10/11/20  0415 10/12/20  0500     --   --  144 144   K 3.2*   < > 3.8 3.8 3.8     --   --  114* 113*   CO2 16*  --   --  15* 16*   BUN 38*  --   --  38* 42*   CREATININE 2.7*  --   --  2.5* 3.0*   CALCIUM 9.2  --   --  9.6 9.9   LABALBU 2.5*  --   --  2.6* 2.8*   BILITOT 0.3  --   --  0.3 0.4    < > = values in this interval not displayed. ASSESSMENT / Plan   1 Renal -acute kidney injury most likely due to septic ATN/hypotension  ? Does have slightly elevated vancomycin level but I doubt this has any impact on the renal function  ? Making some urine output and creatinine started to get worse again  ?  We will give him a little fluid challenge with IVF at 60 mL/h and monitor renal function    2 Electrolytes -hypokalemia- much improved .  3 Metabolic acidosis with some respiratory alkalosis as well. His pH was 7.35 so we will give him some IV bicarbonate  4 Anemia-stable  5 Hypotension mostly due to sepsis/volume depletion. Off pressors . overall doing stable  6 Hx of diabetes mellitus  7 Hx of COVID-19 pneumonia  8 Ventilator dependent respiratory failure - fluctuating FiO2  9 Hx of diastolic dysfunction volume status reasonable closely follow  10 Meds reviewed and discussed with the nursing staff    EMELY Paez D.  [facility].

## 2020-10-12 NOTE — PROGRESS NOTES
Patient:  Holger Aguilar                    Unit/Bed:4B-04/004-A  ECF: [id number]            Bonnie Camejo MD  Date of Admission: 9/23/2020     Assessment and Plan(All pulmonary edema, renal failure, PE, and respiratory failure diagnoses are acute in nature unless otherwise specified):        1. Acute hypoxemic respiratory failure: Patient extubated 10/2/2020.  On low-flow oxygen.  Although patient had ongoing radiographic improvement, he had progressive lethargy and subsequent obtundation.  Patient intubated 10/6/2020 for hypercarbic hypoxemic respiratory failure.  Continue with lung protection strategies targeting a peak pressure 35 or less and a plateau pressure of 30 or less.  Patient at risk for requiring tracheostomy tube. 2. Acute lung injury: Oxygenating well. Ventilatory capacity remains weak. 3. Acute renal failure: Patient has no IV contrast exposure.  Acute deterioration 10/6/2020.  Renal ultrasound was unimpressive.  Fractional excretion of sodium was less than 1 and was consistent with volume contraction.  Patient had hemodilution with volume resuscitation with a drop in hemoglobin of 2 g.  This also goes along with volume contraction.  Pressor requirements have significantly diminished as patient volume resuscitated.  Appreciate nephrology's assistance. Secondary to hypotension and acute tubular necrosis. 4. Sepsis: Secondary to pneumonia and COVID-19.  Associated with tachypnea and pneumonia, initially. Pneumonia was secondary to MRSA and was treated with vancomycin.  As fever developed on 10/5/2020, vancomycin was discontinued and doxycycline was initiated.  Antibiotics also expanded to include Zosyn.  Both doxycycline and Zosyn were initiated on 10/6/2020.  Urinalysis showed greater than 200 white blood cells.  I suspect new onset fever is related to urinary tract infection. Sputum PCR shows persistent MRSA, which may represent colonization. Blood cultures are negative. Patient has completed his course of Zosyn. However he still remains on doxycycline. Repeat sputum culture and PCR. 5. Pneumonia: Secondary to MRSA.  Status post 8 days of vancomycin.  Patient developed new onset fever while on vancomycin.  Vancomycin was subsequently discontinued and doxycycline was initiated.  Radiographically, there is not much change.  As fever developed while on vancomycin, vancomycin was discontinued.  PCR of pulmonary lavage still showed MRSA within the sputum.  This may represent contamination.  However, patient will continue with doxycycline for a total of 10 days.  Day #7/10 doxycycline. 6. Anemia: Was dilutional..  7. Type 2 diabetes mellitus: Subcutaneous insulin. 8. Diastolic heart failure: On calcium channel blocker and diuretic.  Amlodipine on hold secondary to hypotension. 9. Obstructive sleep apnea: Secondary morbid obesity.  CPAP/BiPAP noncompliant. 10. Hyperlipidemia: On atorvastatin. 11. Hypertension:  Currently hypotensive.  Amlodipine discontinued. 12. Gout: On allopurinol. 13. COVID-19: Convalescent plasma received 9/24/2020.  Resolved. 14. Hypotension: Status post pressors and volume resuscitation. Screen for adrenal insufficiency was negative.  Continues to require pressors.  Etiology is secondary to volume contraction and sepsis.  Multifactorial.  Continue with volume resuscitation.  As patient undergoes volume resuscitation, pressor requirements are diminishing.  Suspect this is primarily volume contraction and not septic shock.  Resolved. .     CC: Respiratory failure  HPI: Patient is a 57-year-old morbidly obese black male lifetime non-smoker. Homer Hamilton has a history of morbid obesity associated with type 2 diabetes mellitus, prior alcohol abuse, hyperlipidemia, hypertension, hypogonadism, nonalcoholic steatohepatitis, obstructive sleep apnea, and gout.  Patient was hospitalized 9/6/2020 through 9/15/2020 with hypoxemic respiratory failure secondary to COVID-19 associated with diffuse bilateral infiltrates.  At that time, patient received Decadron, remdesivir, and danazol.  During hospitalization, he had issues with atrial fibrillation.  His insulin therapy required adjustment.  He was discharged home on subcutaneous Lovenox. Patient returned back to the emergency room on 9/23/2020 with increasing SOB and progressive hypoxia. CXR showed diffuse infiltrates.  Deteriorated and required intubation. Patient underwent bronchoscopy on 9/27/2020 which demonstrated no mucus production. Patient extubated 10/2/2020. Patient reintubated for progressive respiratory failure with progressive obtundation on 10/6/2020.  This was associated with acute oliguric renal failure.  Patient was intubated 10/6/2020, and underwent volume resuscitation.  Fractional excretion of sodium returned less than 1 which was consistent with prerenal azotemia.  After volume resuscitation, patient demonstrated that he was hemoconcentrated with a drop of 2 g in the hemoglobin. With volume resuscitation, urine output did improve. After patient was intubated on 10/6/2020, he underwent pulmonary lavage which showed MRSA on the PCR but no other organism.  However, patient was found to have greater than 200 white blood cells in the urine.  Patient was treated with Zosyn and doxycycline. Previously, patient had received vancomycin and developed fever while on vancomycin. Therefore vancomycin was not continued. For further details, please review the assessment and plan. ROS:  Patient  sedated to comfort on mechanical ventilator. PMH:  Per HPI  SHX: Lifetime non-smoker  FHX: Positive for heart disease.   Allergies: PCN  Medications:     sodium bicarbonate infusion 50 mL/hr at 10/12/20 1159    propofol 30 mcg/kg/min (10/12/20 1537)    norepinephrine Stopped (10/10/20 1539)    dextrose        pantoprazole  40 mg Intravenous Daily    CertaVite/Antioxidants  30 mL Oral Daily    anidulafungin  100 mg Intravenous Q24H    lactobacillus  1 capsule Oral Daily with breakfast    enoxaparin  40 mg Subcutaneous BID    insulin glargine  38 Units Subcutaneous Nightly    citalopram  10 mg Oral Daily    insulin lispro  0-12 Units Subcutaneous TID WC    insulin lispro  0-6 Units Subcutaneous Nightly    doxycycline (VIBRAMYCIN) IV  100 mg Intravenous Q12H    chlorhexidine  15 mL Mouth/Throat BID    midodrine  5 mg Oral TID WC    lidocaine 1 % injection  5 mL Intradermal Once    sodium chloride flush  10 mL Intravenous 2 times per day    piperacillin-tazobactam  3.375 g Intravenous Q8H    magnesium replacement protocol   Other RX Placeholder    phosphorus replacement protocol   Other RX Placeholder    calcium replacement protocol   Other RX Placeholder    polyethylene glycol  17 g Oral Every Other Day    allopurinol  200 mg Oral Daily    ARIPiprazole  15 mg Oral Daily    aspirin  81 mg Oral Daily    atorvastatin  40 mg Oral Nightly    Vitamin D  2,000 Units Oral Daily    folic acid  1 mg Oral Daily    gabapentin  800 mg Oral BID    glycopyrrolate-formoterol  2 puff Inhalation BID    senna  1 tablet Oral BID     Vital Signs:   T: 99.3: P: 75: RR: 17: B/P: 97/60: FiO2: 30: O2 Sat: 100: I/O: 1537/1150  GCS:  8:  Body mass index is 48.04 kg/m². Gabriel Hutson General:   Morbidly obese black male. HEENT:  normocephalic and atraumatic.  No scleral icterus. PERR  Neck: supple.  No Thyromegaly. Lungs: Clear to anterior auscultation.  Respirations unlabored. Cardiac: RRR.  No JVD. Abdomen: soft.  Nontender.  Large panniculus. Extremities:  No clubbing, cyanosis x 4. No lower extremity edema bilaterally. Vasculature: capillary refill < 3 seconds. Palpable dorsalis pedis pulses. Skin:  warm and dry. Psych:    Sedated on mechanical ventilator. Gabriel Hutson Lymph:  No supraclavicular adenopathy. Neurologic:  No focal deficit.  No seizures.     Data: (All radiographs, tracings, PFTs, and imaging are personally viewed and interpreted unless

## 2020-10-12 NOTE — PLAN OF CARE
Problem: Nutrition  Goal: Optimal nutrition therapy  10/12/2020 1339 by Bard Mati RD, LD  Outcome: Ongoing   Nutrition Problem #1: Inadequate oral intake  Intervention: Food and/or Nutrient Delivery: Continue NPO(restart EN if able to use gut, if bowel rest needed recommend TPN)  Nutritional Goals: Patient will tolerate EN adequate for active late phase covid infection

## 2020-10-13 LAB
ALBUMIN SERPL-MCNC: 2.5 G/DL (ref 3.5–5.1)
ALP BLD-CCNC: 95 U/L (ref 38–126)
ALT SERPL-CCNC: 30 U/L (ref 11–66)
ANION GAP SERPL CALCULATED.3IONS-SCNC: 13 MEQ/L (ref 8–16)
AST SERPL-CCNC: 27 U/L (ref 5–40)
BASOPHILS # BLD: 0.2 %
BASOPHILS ABSOLUTE: 0 THOU/MM3 (ref 0–0.1)
BILIRUB SERPL-MCNC: 0.3 MG/DL (ref 0.3–1.2)
BUN BLDV-MCNC: 38 MG/DL (ref 7–22)
CALCIUM SERPL-MCNC: 9.8 MG/DL (ref 8.5–10.5)
CHLORIDE BLD-SCNC: 113 MEQ/L (ref 98–111)
CO2: 16 MEQ/L (ref 23–33)
CREAT SERPL-MCNC: 2.7 MG/DL (ref 0.4–1.2)
EOSINOPHIL # BLD: 6.2 %
EOSINOPHILS ABSOLUTE: 0.8 THOU/MM3 (ref 0–0.4)
ERYTHROCYTE [DISTWIDTH] IN BLOOD BY AUTOMATED COUNT: 17.9 % (ref 11.5–14.5)
ERYTHROCYTE [DISTWIDTH] IN BLOOD BY AUTOMATED COUNT: 61.5 FL (ref 35–45)
GFR SERPL CREATININE-BSD FRML MDRD: 30 ML/MIN/1.73M2
GLUCOSE BLD-MCNC: 153 MG/DL (ref 70–108)
GLUCOSE BLD-MCNC: 157 MG/DL (ref 70–108)
GLUCOSE BLD-MCNC: 160 MG/DL (ref 70–108)
GLUCOSE BLD-MCNC: 161 MG/DL (ref 70–108)
GLUCOSE BLD-MCNC: 174 MG/DL (ref 70–108)
HCT VFR BLD CALC: 24.6 % (ref 42–52)
HEMOGLOBIN: 7.5 GM/DL (ref 14–18)
IMMATURE GRANS (ABS): 0.22 THOU/MM3 (ref 0–0.07)
IMMATURE GRANULOCYTES: 1.8 %
LACTIC ACID: 0.5 MMOL/L (ref 0.5–2.2)
LYMPHOCYTES # BLD: 7.7 %
LYMPHOCYTES ABSOLUTE: 0.9 THOU/MM3 (ref 1–4.8)
MCH RBC QN AUTO: 28.8 PG (ref 26–33)
MCHC RBC AUTO-ENTMCNC: 30.5 GM/DL (ref 32.2–35.5)
MCV RBC AUTO: 94.6 FL (ref 80–94)
MONOCYTES # BLD: 6.4 %
MONOCYTES ABSOLUTE: 0.8 THOU/MM3 (ref 0.4–1.3)
NUCLEATED RED BLOOD CELLS: 0 /100 WBC
PLATELET # BLD: 363 THOU/MM3 (ref 130–400)
PMV BLD AUTO: 10.8 FL (ref 9.4–12.4)
POTASSIUM SERPL-SCNC: 3.1 MEQ/L (ref 3.5–5.2)
POTASSIUM SERPL-SCNC: 3.1 MEQ/L (ref 3.5–5.2)
POTASSIUM SERPL-SCNC: 3.2 MEQ/L (ref 3.5–5.2)
PROCALCITONIN: 1.03 NG/ML (ref 0.01–0.09)
RBC # BLD: 2.6 MILL/MM3 (ref 4.7–6.1)
SEG NEUTROPHILS: 77.7 %
SEGMENTED NEUTROPHILS ABSOLUTE COUNT: 9.5 THOU/MM3 (ref 1.8–7.7)
SODIUM BLD-SCNC: 142 MEQ/L (ref 135–145)
TOTAL PROTEIN: 6 G/DL (ref 6.1–8)
WBC # BLD: 12.2 THOU/MM3 (ref 4.8–10.8)

## 2020-10-13 PROCEDURE — 6360000002 HC RX W HCPCS: Performed by: INTERNAL MEDICINE

## 2020-10-13 PROCEDURE — 6360000002 HC RX W HCPCS: Performed by: NURSE PRACTITIONER

## 2020-10-13 PROCEDURE — 94003 VENT MGMT INPAT SUBQ DAY: CPT

## 2020-10-13 PROCEDURE — 6370000000 HC RX 637 (ALT 250 FOR IP): Performed by: INTERNAL MEDICINE

## 2020-10-13 PROCEDURE — 94770 HC ETCO2 MONITOR DAILY: CPT

## 2020-10-13 PROCEDURE — 2580000003 HC RX 258: Performed by: NURSE PRACTITIONER

## 2020-10-13 PROCEDURE — 83605 ASSAY OF LACTIC ACID: CPT

## 2020-10-13 PROCEDURE — 84132 ASSAY OF SERUM POTASSIUM: CPT

## 2020-10-13 PROCEDURE — 85025 COMPLETE CBC W/AUTO DIFF WBC: CPT

## 2020-10-13 PROCEDURE — 6370000000 HC RX 637 (ALT 250 FOR IP): Performed by: NURSE PRACTITIONER

## 2020-10-13 PROCEDURE — 2500000003 HC RX 250 WO HCPCS: Performed by: INTERNAL MEDICINE

## 2020-10-13 PROCEDURE — 82948 REAGENT STRIP/BLOOD GLUCOSE: CPT

## 2020-10-13 PROCEDURE — 6370000000 HC RX 637 (ALT 250 FOR IP): Performed by: HOSPITALIST

## 2020-10-13 PROCEDURE — 80053 COMPREHEN METABOLIC PANEL: CPT

## 2020-10-13 PROCEDURE — 99291 CRITICAL CARE FIRST HOUR: CPT | Performed by: INTERNAL MEDICINE

## 2020-10-13 PROCEDURE — 94640 AIRWAY INHALATION TREATMENT: CPT

## 2020-10-13 PROCEDURE — 99232 SBSQ HOSP IP/OBS MODERATE 35: CPT | Performed by: INTERNAL MEDICINE

## 2020-10-13 PROCEDURE — 94761 N-INVAS EAR/PLS OXIMETRY MLT: CPT

## 2020-10-13 PROCEDURE — 36415 COLL VENOUS BLD VENIPUNCTURE: CPT

## 2020-10-13 PROCEDURE — 2700000000 HC OXYGEN THERAPY PER DAY

## 2020-10-13 PROCEDURE — C9113 INJ PANTOPRAZOLE SODIUM, VIA: HCPCS | Performed by: INTERNAL MEDICINE

## 2020-10-13 PROCEDURE — 2580000003 HC RX 258: Performed by: INTERNAL MEDICINE

## 2020-10-13 PROCEDURE — 2100000000 HC CCU R&B

## 2020-10-13 PROCEDURE — 84145 PROCALCITONIN (PCT): CPT

## 2020-10-13 PROCEDURE — 2580000003 HC RX 258: Performed by: STUDENT IN AN ORGANIZED HEALTH CARE EDUCATION/TRAINING PROGRAM

## 2020-10-13 PROCEDURE — 6360000002 HC RX W HCPCS: Performed by: STUDENT IN AN ORGANIZED HEALTH CARE EDUCATION/TRAINING PROGRAM

## 2020-10-13 RX ADMIN — FOLIC ACID 1 MG: 1 TABLET ORAL at 08:28

## 2020-10-13 RX ADMIN — GLYCOPYRROLATE AND FORMOTEROL FUMARATE 2 PUFF: 9; 4.8 AEROSOL, METERED RESPIRATORY (INHALATION) at 08:54

## 2020-10-13 RX ADMIN — SODIUM CHLORIDE, PRESERVATIVE FREE 10 ML: 5 INJECTION INTRAVENOUS at 20:41

## 2020-10-13 RX ADMIN — PROPOFOL 30 MCG/KG/MIN: 10 INJECTION, EMULSION INTRAVENOUS at 22:38

## 2020-10-13 RX ADMIN — Medication 2000 UNITS: at 08:28

## 2020-10-13 RX ADMIN — Medication 1 CAPSULE: at 08:28

## 2020-10-13 RX ADMIN — PIPERACILLIN AND TAZOBACTAM 3.38 G: 3; .375 INJECTION, POWDER, LYOPHILIZED, FOR SOLUTION INTRAVENOUS at 01:03

## 2020-10-13 RX ADMIN — ARIPIPRAZOLE 15 MG: 15 TABLET ORAL at 08:28

## 2020-10-13 RX ADMIN — INSULIN LISPRO 2 UNITS: 100 INJECTION, SOLUTION INTRAVENOUS; SUBCUTANEOUS at 16:34

## 2020-10-13 RX ADMIN — POTASSIUM CHLORIDE 20 MEQ: 400 INJECTION, SOLUTION INTRAVENOUS at 21:22

## 2020-10-13 RX ADMIN — PIPERACILLIN AND TAZOBACTAM 3.38 G: 3; .375 INJECTION, POWDER, LYOPHILIZED, FOR SOLUTION INTRAVENOUS at 09:37

## 2020-10-13 RX ADMIN — MIDODRINE HYDROCHLORIDE 5 MG: 5 TABLET ORAL at 14:02

## 2020-10-13 RX ADMIN — PROPOFOL 15 MCG/KG/MIN: 10 INJECTION, EMULSION INTRAVENOUS at 16:11

## 2020-10-13 RX ADMIN — POTASSIUM CHLORIDE 20 MEQ: 400 INJECTION, SOLUTION INTRAVENOUS at 08:15

## 2020-10-13 RX ADMIN — Medication 15 ML: at 20:47

## 2020-10-13 RX ADMIN — PROPOFOL 30 MCG/KG/MIN: 10 INJECTION, EMULSION INTRAVENOUS at 01:06

## 2020-10-13 RX ADMIN — INSULIN GLARGINE 38 UNITS: 100 INJECTION, SOLUTION SUBCUTANEOUS at 20:43

## 2020-10-13 RX ADMIN — INSULIN LISPRO 1 UNITS: 100 INJECTION, SOLUTION INTRAVENOUS; SUBCUTANEOUS at 20:44

## 2020-10-13 RX ADMIN — DOXYCYCLINE 100 MG: 100 INJECTION, POWDER, LYOPHILIZED, FOR SOLUTION INTRAVENOUS at 21:22

## 2020-10-13 RX ADMIN — CITALOPRAM 10 MG: 20 TABLET, FILM COATED ORAL at 08:28

## 2020-10-13 RX ADMIN — POTASSIUM CHLORIDE 20 MEQ: 400 INJECTION, SOLUTION INTRAVENOUS at 15:42

## 2020-10-13 RX ADMIN — GABAPENTIN 800 MG: 400 CAPSULE ORAL at 08:27

## 2020-10-13 RX ADMIN — MIDODRINE HYDROCHLORIDE 5 MG: 5 TABLET ORAL at 16:51

## 2020-10-13 RX ADMIN — INSULIN LISPRO 2 UNITS: 100 INJECTION, SOLUTION INTRAVENOUS; SUBCUTANEOUS at 11:43

## 2020-10-13 RX ADMIN — DOXYCYCLINE 100 MG: 100 INJECTION, POWDER, LYOPHILIZED, FOR SOLUTION INTRAVENOUS at 09:42

## 2020-10-13 RX ADMIN — INSULIN LISPRO 2 UNITS: 100 INJECTION, SOLUTION INTRAVENOUS; SUBCUTANEOUS at 08:25

## 2020-10-13 RX ADMIN — POTASSIUM CHLORIDE: 2 INJECTION, SOLUTION, CONCENTRATE INTRAVENOUS at 09:36

## 2020-10-13 RX ADMIN — POTASSIUM CHLORIDE 20 MEQ: 400 INJECTION, SOLUTION INTRAVENOUS at 22:39

## 2020-10-13 RX ADMIN — ENOXAPARIN SODIUM 40 MG: 40 INJECTION SUBCUTANEOUS at 20:41

## 2020-10-13 RX ADMIN — DEXTROSE MONOHYDRATE 100 MG: 50 INJECTION, SOLUTION INTRAVENOUS at 14:01

## 2020-10-13 RX ADMIN — GABAPENTIN 800 MG: 400 CAPSULE ORAL at 20:42

## 2020-10-13 RX ADMIN — PIPERACILLIN AND TAZOBACTAM 3.38 G: 3; .375 INJECTION, POWDER, LYOPHILIZED, FOR SOLUTION INTRAVENOUS at 17:26

## 2020-10-13 RX ADMIN — GLYCOPYRROLATE AND FORMOTEROL FUMARATE 2 PUFF: 9; 4.8 AEROSOL, METERED RESPIRATORY (INHALATION) at 21:06

## 2020-10-13 RX ADMIN — PROPOFOL 15 MCG/KG/MIN: 10 INJECTION, EMULSION INTRAVENOUS at 06:43

## 2020-10-13 RX ADMIN — PANTOPRAZOLE SODIUM 40 MG: 40 INJECTION, POWDER, FOR SOLUTION INTRAVENOUS at 08:27

## 2020-10-13 RX ADMIN — Medication 30 ML: at 11:04

## 2020-10-13 RX ADMIN — ATORVASTATIN CALCIUM 40 MG: 40 TABLET, FILM COATED ORAL at 20:42

## 2020-10-13 RX ADMIN — SENNOSIDES 8.6 MG: 8.6 TABLET, FILM COATED ORAL at 20:42

## 2020-10-13 RX ADMIN — POTASSIUM CHLORIDE 20 MEQ: 400 INJECTION, SOLUTION INTRAVENOUS at 16:48

## 2020-10-13 RX ADMIN — ALLOPURINOL 200 MG: 100 TABLET ORAL at 08:28

## 2020-10-13 RX ADMIN — Medication 15 ML: at 08:29

## 2020-10-13 RX ADMIN — POTASSIUM CHLORIDE 20 MEQ: 400 INJECTION, SOLUTION INTRAVENOUS at 09:36

## 2020-10-13 RX ADMIN — SODIUM CHLORIDE, PRESERVATIVE FREE 10 ML: 5 INJECTION INTRAVENOUS at 08:27

## 2020-10-13 RX ADMIN — MIDODRINE HYDROCHLORIDE 5 MG: 5 TABLET ORAL at 08:28

## 2020-10-13 ASSESSMENT — PULMONARY FUNCTION TESTS
PIF_VALUE: 19
PIF_VALUE: 19
PIF_VALUE: 23
PIF_VALUE: 21
PIF_VALUE: 21
PIF_VALUE: 20
PIF_VALUE: 20

## 2020-10-13 NOTE — CARE COORDINATION
10/13/20, 11:07 AM EDT    DISCHARGE ON GOING [address] day: 20  Location: -06/006-A Reason for admit: Acute respiratory failure with hypoxemia ([facility] [address] [J96.01]  Acute respiratory failure with hypoxia ([facility] [address] [J96.01]   Procedure: 9/23 CXR: Bilateral pulmonary opacification worse than on previous study dated 10 September 2020. This may represent worsening inflammatory process, possibly Covid 19 infection; borderline cardiomegaly  9/23 Intubated  9/24 CVC left subclavian - 9/30 removed  1/98 PICC right basilic  04/8 Extubated to bipap  10/6 Reintubated  10/6 Renal US: Negative  10/7 CT Abd/pelvis: Bilateral lower lobe pneumonia. Known Covid; Distended colon with fluid, air and stool  10/8 CXR: No acute findings  10/11  CXR -  Similar bilateral ill-defined pneumonia. 10/12 CXR-Progressive bilateral consolidations or ARDS. Treatment Plan of Care: remains on Vent 30%FiO2, O2 at 0.5 liter, PEEP 6 cmH2O, Tmax 98.8, Levophed drip, Propofol drip, Bicarb drip, IV Eraxis, IV Doxycycline, diabetes management, IV Protonix, electrolyte replacement protocols, IV Zosyn. GI conference consult, offered supportive care , anticoags on hold for rectal bleeding. Hgb 7.5 today. TF via OG at goal. Flexiseal, mason catheter. Barriers to Discharge: medical stability. PCP: Jose Estrella MD  Readmission Risk Score: 53%  Patient Goals/Plan/Treatment Preferences: from home alone. Plan is possible TCU vs LTACH. SW following. PT/OT when appropriate.

## 2020-10-13 NOTE — PROGRESS NOTES
Patient:  Holger Aguilar                    Unit/Bed:4B-04/004-A  [facility]: [id number]            Ruth Farfan MD  Date of Admission: 9/23/2020     Assessment and Plan(All pulmonary edema, renal failure, PE, and respiratory failure diagnoses are acute in nature unless otherwise specified):        1. Acute hypoxemic respiratory failure: Patient extubated 10/2/2020.  On low-flow oxygen.  Although patient had ongoing radiographic improvement, he had progressive lethargy and subsequent obtundation.  Patient intubated 10/6/2020 for hypercarbic hypoxemic respiratory failure.  Continue with lung protection strategies targeting a peak pressure 35 or less and a plateau pressure of 30 or less.  Patient at risk for requiring tracheostomy tube. 2. Acute lung injury: Oxygenating well. Ventilatory capacity remains weak. 3. Acute renal failure: Patient has no IV contrast exposure.  Acute deterioration 10/6/2020.  Renal ultrasound was unimpressive.  Fractional excretion of sodium was less than 1 and was consistent with volume contraction.  Patient had hemodilution with volume resuscitation with a drop in hemoglobin of 2 g.  This also goes along with volume contraction.  Pressor requirements have significantly diminished as patient volume resuscitated.  Appreciate nephrology's assistance. Secondary to hypotension and acute tubular necrosis. 4. Sepsis: Secondary to pneumonia and COVID-19.  Associated with tachypnea and pneumonia, initially. Pneumonia was secondary to MRSA and was treated with vancomycin.  As fever developed on 10/5/2020, vancomycin was discontinued and doxycycline was initiated.  Antibiotics also expanded to include Zosyn.  Both doxycycline and Zosyn were initiated on 10/6/2020.  Urinalysis showed greater than 200 white blood cells.  I suspect new onset fever is related to urinary tract infection. Sputum PCR shows persistent MRSA, which may represent colonization.  Blood cultures are negative.  Patient has completed his course of Keli Sides he still remains on doxycycline. Repeat sputum culture and PCR collected 10/12/2020 remain negative. .  5. Pneumonia: Secondary to MRSA.  Status post 8 days of vancomycin.  Patient developed new onset fever while on vancomycin.  Vancomycin was subsequently discontinued and doxycycline was initiated.  Radiographically, there is not much change.  As fever developed while on vancomycin, vancomycin was discontinued.  PCR of pulmonary lavage still showed MRSA within the sputum.  This may represent contamination.  However, patient will continue with doxycycline for a total of 10 days.  Day #8/10 doxycycline. 6. Anemia: Was dilutional..  7. Type 2 diabetes mellitus: Subcutaneous insulin. 8. Diastolic heart failure: On calcium channel blocker and diuretic.  Amlodipine on hold secondary to hypotension. 9. Obstructive sleep apnea: Secondary morbid obesity.  CPAP/BiPAP noncompliant. 10. Hyperlipidemia: On atorvastatin. 11. Hypertension:  Currently hypotensive.  Amlodipine discontinued. 12. Gout: On allopurinol. 13. COVID-19: Convalescent plasma received 9/24/2020.  Resolved. 14. Hypotension: Status post pressors and volume resuscitation. Screen for adrenal insufficiency was negative.  Continues to require pressors.  Etiology is secondary to volume contraction and sepsis.  Multifactorial.  Continue with volume resuscitation.  As patient undergoes volume resuscitation, pressor requirements are diminishing.  Suspect this is primarily volume contraction and not septic shock.  Resolved. .     CC: Respiratory failure  HPI: Patient is a 47-year-old morbidly obese black male lifetime non-smoker. Carly Graham has a history of morbid obesity associated with type 2 diabetes mellitus, prior alcohol abuse, hyperlipidemia, hypertension, hypogonadism, nonalcoholic steatohepatitis, obstructive sleep apnea, and gout.  Patient was hospitalized 9/6/2020 through 9/15/2020 with hypoxemic respiratory failure secondary to COVID-19 associated with diffuse bilateral infiltrates.  At that time, patient received Decadron, remdesivir, and danazol.  During hospitalization, he had issues with atrial fibrillation.  His insulin therapy required adjustment.  He was discharged home on subcutaneous Lovenox. Patient returned back to the emergency room on 9/23/2020 with increasing SOB and progressive hypoxia. CXR showed diffuse infiltrates.  Deteriorated and required intubation. Patient underwent bronchoscopy on 9/27/2020 which demonstrated no mucus production. Patient extubated 10/2/2020. Patient reintubated for progressive respiratory failure with progressive obtundation on 10/6/2020.  This was associated with acute oliguric renal failure.  Patient was intubated 10/6/2020, and underwent volume resuscitation.  Fractional excretion of sodium returned less than 1 which was consistent with prerenal azotemia.  After volume resuscitation, patient demonstrated that he was hemoconcentrated with a drop of 2 g in the hemoglobin. With volume resuscitation, urine output did improve. After patient was intubated on 10/6/2020, he underwent pulmonary lavage which showed MRSA on the PCR but no other organism.  However, patient was found to have greater than 200 white blood cells in the urine.  Patient was treated with Zosyn and doxycycline. Previously, patient had received vancomycin and developed fever while on vancomycin. Therefore vancomycin was not continued. For further details, please review the assessment and plan. ROS:  Patient  sedated to comfort on mechanical ventilator. PMH:  Per HPI  SHX: Lifetime non-smoker  FHX: Positive for heart disease.   Allergies: PCN  Medications:     sodium bicarbonate infusion 50 mL/hr at 10/13/20 0936    propofol 15 mcg/kg/min (10/13/20 0643)    norepinephrine Stopped (10/10/20 2939)    dextrose        pantoprazole  40 mg Intravenous Daily    CertaVite/Antioxidants  30 mL Oral Daily    anidulafungin  100 mg Intravenous Q24H    lactobacillus  1 capsule Oral Daily with breakfast    enoxaparin  40 mg Subcutaneous BID    insulin glargine  38 Units Subcutaneous Nightly    citalopram  10 mg Oral Daily    insulin lispro  0-12 Units Subcutaneous TID WC    insulin lispro  0-6 Units Subcutaneous Nightly    doxycycline (VIBRAMYCIN) IV  100 mg Intravenous Q12H    chlorhexidine  15 mL Mouth/Throat BID    midodrine  5 mg Oral TID WC    lidocaine 1 % injection  5 mL Intradermal Once    sodium chloride flush  10 mL Intravenous 2 times per day    piperacillin-tazobactam  3.375 g Intravenous Q8H    magnesium replacement protocol   Other RX Placeholder    phosphorus replacement protocol   Other RX Placeholder    calcium replacement protocol   Other RX Placeholder    polyethylene glycol  17 g Oral Every Other Day    allopurinol  200 mg Oral Daily    ARIPiprazole  15 mg Oral Daily    aspirin  81 mg Oral Daily    atorvastatin  40 mg Oral Nightly    Vitamin D  2,000 Units Oral Daily    folic acid  1 mg Oral Daily    gabapentin  800 mg Oral BID    glycopyrrolate-formoterol  2 puff Inhalation BID    senna  1 tablet Oral BID     Vital Signs:   T: 98.8: P: 78: RR: 18: B/P: 95/56: FiO2: 30: O2 Sat: 100: I/O: 1505/625  GCS:  8:  Body mass index is 48.04 kg/m². Louise Hammad General:   Morbidly obese black male. HEENT:  normocephalic and atraumatic.  No scleral icterus. PERR  Neck: supple.  No Thyromegaly. Lungs: Clear to anterior auscultation.  Respirations unlabored. Cardiac: RRR.  No JVD. Abdomen: soft.  Nontender.  Large panniculus. Extremities:  No clubbing, cyanosis x 4. No lower extremity edema bilaterally. Vasculature: capillary refill < 3 seconds. Palpable dorsalis pedis pulses. Skin:  warm and dry. Psych:    Sedated on mechanical ventilator. Louise Hammad Lymph:  No supraclavicular adenopathy. Neurologic:  No focal deficit.  No seizures.     Data: (All radiographs, tracings, PFTs, and imaging are personally viewed and interpreted unless otherwise noted). · Telemetry shows sinus rhythm. · Echocardiogram shows an ejection fraction of 60%. .  · Sputum collected 9/23/2020: Positive for MRSA. · Sputum PCR collected 10/6/2020 is positive for MRSA. · Renal ultrasound report 10/6/2020 shows normal kidneys. · Urine positive for Candida glabrata. · Chest x-ray shows diffuse bilateral infiltrates. · Sputum culture and PCR collected 10/12/2020: Negative. · Sodium 142, potassium 3.1, chloride 116, bicarb 16, BUN 38, creatinine 2.7, glucose 157. Procalcitonin 1.03. White blood cell count 12.2, hemoglobin 7.5, platelets 760. .     CC time 35 minutes.  Time was discontiguous. Electronically signed by Leander Bhatti M.D.

## 2020-10-13 NOTE — PROGRESS NOTES
[facility]         Renal Inpatient Follow-Up note         10/13/2020 10:13 AM    Pt Name:   Emeka Strange  YOB: 1970  Attending:   Alex Thibodeaux MD    Chief Complaint : Emeka Strange is a 44 y.o. male being followed by nephrology for acute kidney injury    Interval History :   Patient seen and examined by me.  No distress  Intubated cannot accurately assess history or review of systems  Patient FiO2 is still at 30%  Urine output is better      Scheduled Medications :    pantoprazole  40 mg Intravenous Daily    CertaVite/Antioxidants  30 mL Oral Daily    anidulafungin  100 mg Intravenous Q24H    lactobacillus  1 capsule Oral Daily with breakfast    enoxaparin  40 mg Subcutaneous BID    insulin glargine  38 Units Subcutaneous Nightly    citalopram  10 mg Oral Daily    insulin lispro  0-12 Units Subcutaneous TID WC    insulin lispro  0-6 Units Subcutaneous Nightly    doxycycline (VIBRAMYCIN) IV  100 mg Intravenous Q12H    chlorhexidine  15 mL Mouth/Throat BID    midodrine  5 mg Oral TID WC    lidocaine 1 % injection  5 mL Intradermal Once    sodium chloride flush  10 mL Intravenous 2 times per day    piperacillin-tazobactam  3.375 g Intravenous Q8H    magnesium replacement protocol   Other RX Placeholder    phosphorus replacement protocol   Other RX Placeholder    calcium replacement protocol   Other RX Placeholder    polyethylene glycol  17 g Oral Every Other Day    allopurinol  200 mg Oral Daily    ARIPiprazole  15 mg Oral Daily    aspirin  81 mg Oral Daily    atorvastatin  40 mg Oral Nightly    Vitamin D  2,000 Units Oral Daily    folic acid  1 mg Oral Daily    gabapentin  800 mg Oral BID    glycopyrrolate-formoterol  2 puff Inhalation BID    senna  1 tablet Oral BID      sodium bicarbonate infusion 50 mL/hr at 10/13/20 0936    propofol 15 mcg/kg/min (10/13/20 0643)    norepinephrine Stopped (10/10/20 1539)    dextrose Vitals :  /66   Pulse 88   Temp 98.8 °F (37.1 °C) (Bladder)   Resp 14   Ht 5' 8\" (1.727 m)   Wt (!) 315 lb 14.7 oz (143.3 kg)   SpO2 100%   BMI 48.04 kg/m²     24HR INTAKE/OUTPUT:      Intake/Output Summary (Last 24 hours) at 10/13/2020 1013  Last data filed at 10/13/2020 0542  Gross per 24 hour   Intake 2898 ml   Output 2000 ml   Net 898 ml     Last 3 weights  Wt Readings from Last 3 Encounters:   10/11/20 (!) 315 lb 14.7 oz (143.3 kg)   09/15/20 281 lb 6.4 oz (127.6 kg)   08/21/20 (!) 306 lb 6.4 oz (139 kg)           Physical Exam : Due to COVID-19 situation termination is limited exam from outside the room  General Appearance: Obese well-nourished no distress  Mouth/Throat: ET tube in place  CNS- not much responsive  Psych-not agitated  Abdomen: Appears slightly distended  Musculoskeletal:  Edema -mild edema noted         Last 3 CBC   Recent Labs     10/11/20  0415 10/12/20  0500 10/13/20  0349   WBC 18.7* 19.9* 12.2*   RBC 2.96* 2.95* 2.60*   HGB 8.6* 8.5* 7.5*   HCT 28.3* 28.4* 24.6*    399 363     Last 3 CMP  Recent Labs     10/11/20  0415 10/12/20  0500 10/13/20  0349    144 142   K 3.8 3.8 3.1*   * 113* 113*   CO2 15* 16* 16*   BUN 38* 42* 38*   CREATININE 2.5* 3.0* 2.7*   CALCIUM 9.6 9.9 9.8   LABALBU 2.6* 2.8* 2.5*   BILITOT 0.3 0.4 0.3             ASSESSMENT / Plan   1 Renal -acute kidney injury most likely due to septic ATN/hypotension  ? Improving with some IV hydration . ? Will continue increase to 75 ml/hr and monitor renal fx    2 Electrolytes -hypokalemia- being replaced. Will ad some kcl to the IVF as well.  3 Metabolic acidosis with some respiratory alkalosis as well. continue IV bicarbonate  4 Anemia- S/P post rectal clot   5 Hypotension mostly due to sepsis/volume depletion. Off pressors . overall doing stable  6 Hx of diabetes mellitus  7 Hx of COVID-19 pneumonia  8 Ventilator dependent respiratory failure - fluctuating FiO2  9 Hx of diastolic dysfunction volume status reasonable closely follow  10 Meds reviewed and discussed with the nursing staff    EMELY Hawkins D.  [facility].

## 2020-10-13 NOTE — PLAN OF CARE
Problem: Impaired respiratory status  Goal: Patient will achieve/maintain normal respiratory rate/effort  10/13/2020 1300 by Alessandra Carlos RCP  Outcome: Ongoing  Note: Vent setting optimized to achieve target tidal volume, respiratory rate and ideal oxygen saturations. The patient was able to tolerate SBT. RSBI  was 31 with EtCO2 of 34 and SpO2 of 100 on 30% FiO2. Spontanteous VT was 477 and RR 15 breaths/min. The patient has been on SBT since 0511. Problem: Impaired respiratory status  Goal: Clear lung sounds  10/13/2020 1300 by Alessandra Carlos RCP  Outcome: Ongoing  Note:  Patient lung sounds are considered normal for their current lung condition. No signs of distress noted.  Current treatment regimen appropriate

## 2020-10-13 NOTE — PROGRESS NOTES
Comprehensive Nutrition Assessment    Type and Reason for Visit:  Reassess(TF management)    Nutrition Recommendations/Plan:   Vital 1.2 at 20 ml/hr  1 Proteinex 2GO (2.5 oz liquid protein bottle) BID  Free water per MD    Nutrition Assessment:  Pt. stable from a nutritional standpoint AEB patient tolerating trickle TF. Remains at risk for further nutritional compromise r/t admitted with acute respiratory failure with hypoxemia, +covid, morbidly obese, altered GI function (recent large clot from rectal tube site), elevated renal labs, elevated Hgb A1C of 9.9, and underlying medical condition (hx: CAD, DM, GERD, Alcohol abuse, HLD, HTN, Vitamin D deficiency). Nutrition recommendations/interventions as per above. Malnutrition Assessment:  Malnutrition Status:  Insufficient data(+covid)    Context:  Acute Illness     Findings of the 6 clinical characteristics of malnutrition:  Energy Intake:  Mild decrease in energy intake (Comment)(good appetite or on EN at goal since admit)  Weight Loss:  (16# or 5% in 2 months)     Body Fat Loss:  Unable to assess(+covid patient)     Muscle Mass Loss:  Unable to assess(+covid)    Fluid Accumulation:  1 - Mild Extremities   Strength:  Not Performed    Estimated Daily Nutrient Needs:  Energy (kcal):  6057-5069 (30-32 kcals/kg IBW in active late phase); Weight Used for Energy Requirements:  Ideal(70 kg - ideal weight)     Protein (g):  ~140 gms (2/kgm IBW) as renal status allows; Weight Used for Protein Requirements:  Ideal(70 kg)        Fluid (ml/day):  per MD; Weight Used for Fluid Requirements:  (n/a)      Nutrition Related Findings:  +covid; reintubated 10/6; MAP: 78. Rectal tube removed 10/12 due to large blood clot on site of rectal tube, tube feeding was also stopped. GI saw and report patient had overinflated balloon likely resulting in ulcer. Spoke to SIERRA MCGHEE who reports that patient is toleratine TF at 20 ml/hr.  Patient received 100% of Rx TF volume over the last 24 hours. Labs: potassium: 3.1, BUN: 38, Creatinine: 2.7, POC: 160. Meds: diprivan, zyloprim, eraxis, vibromycin, folic acid, lantus, humalog, culturelle, zosyn, certaVite/Antioxidants. Wounds:  Stage III, Pressure Injury(hip - per wound care RN healing as of 9/30)       Current Nutrition Therapies:    Current Tube Feeding (TF) Orders:  · Feeding Route: Orogastric(OGT placed 9/23/20)  · Formula: Semi-Elemental(Vital 1.2 started 10/6)  · Schedule: (Vital 1.2 stopped 10/13 and restarted at 20 ml/hr)  · Additives/Modulars: Protein(1 2.5ounce liquid protein bottle BID started 10/8)  · Water Flushes: per MD  · Goal TF & Flush Orders Provides: 1090 kcals (576 vital, 208 protein, 306 diprivan), 88 grams (36 vital, 52 protein), 53 grams CHO, 2 grams fiber, 389 ml water, 810 mg potassium, 405 mg phosphorus.     Additional Calorie Sources:   diprivan at 11.6 ml/hr provides 306 lipid kcals in 24 hours    Anthropometric Measures:  · Height: 5' 8\" (172.7 cm)  · Current Body Weight: (10/11, +1 generalized edema)   · Admission Body Weight: 285 lb (129.3 kg)(9/23/20, stated, +1 BLE and BUE edema)    · Usual Body Weight: 306 lb (138.8 kg)([facility] 6/29/20[facility]  [facility]     · Ideal Body Weight: 154 lbs;   · BMI: 47.9  · Adjusted Body Weight:  ; No Adjustment   · BMI Categories: Obese Class 3 (BMI 40.0 or greater)(48.04)       Nutrition Diagnosis:   · Inadequate oral intake related to impaired respiratory function as evidenced by NPO or clear liquid status due to medical condition, intubation    Nutrition Interventions:   Food and/or Nutrient Delivery:  Continue NPO, Continue Current Tube Feeding, Vitamin Supplement  Nutrition Education/Counseling:  No recommendation at this time   Coordination of Nutrition Care:  Continued Inpatient Monitoring, Interdisciplinary Rounds    Goals:  Patient will tolerate EN adequate for active late phase covid infection       Nutrition Monitoring and Evaluation: Behavioral-Environmental Outcomes:  (n/a)   Food/Nutrient Intake Outcomes:  Enteral Nutrition Intake/Tolerance  Physical Signs/Symptoms Outcomes:  Biochemical Data, GI Status, Fluid Status or Edema, Hemodynamic Status, Weight, Skin     Discharge Planning:     Too soon to determine     Electronically signed by Brandan Yung RD, KAELYN on 10/13/20 at 3:15 PM EDT    Contact: [phone]

## 2020-10-13 NOTE — PLAN OF CARE
Vent setting optimized to achieve target tidal volume, respiratory rate and ideal oxygen saturations. SBT will be performed when appropriate. Pt on SPONT at this time.   Problem: Impaired respiratory status  Goal: Patient will achieve/maintain normal respiratory rate/effort  Outcome: Ongoing  Goal: Clear lung sounds  Outcome: Ongoing

## 2020-10-14 LAB
ALBUMIN SERPL-MCNC: 2.7 G/DL (ref 3.5–5.1)
ALP BLD-CCNC: 91 U/L (ref 38–126)
ALT SERPL-CCNC: 32 U/L (ref 11–66)
ANION GAP SERPL CALCULATED.3IONS-SCNC: 12 MEQ/L (ref 8–16)
AST SERPL-CCNC: 25 U/L (ref 5–40)
BASOPHILS # BLD: 0.4 %
BASOPHILS ABSOLUTE: 0 THOU/MM3 (ref 0–0.1)
BILIRUB SERPL-MCNC: 0.3 MG/DL (ref 0.3–1.2)
BUN BLDV-MCNC: 35 MG/DL (ref 7–22)
CALCIUM SERPL-MCNC: 9.8 MG/DL (ref 8.5–10.5)
CHLORIDE BLD-SCNC: 115 MEQ/L (ref 98–111)
CO2: 16 MEQ/L (ref 23–33)
CREAT SERPL-MCNC: 2.6 MG/DL (ref 0.4–1.2)
EOSINOPHIL # BLD: 7.7 %
EOSINOPHILS ABSOLUTE: 0.8 THOU/MM3 (ref 0–0.4)
ERYTHROCYTE [DISTWIDTH] IN BLOOD BY AUTOMATED COUNT: 17.6 % (ref 11.5–14.5)
ERYTHROCYTE [DISTWIDTH] IN BLOOD BY AUTOMATED COUNT: 60.2 FL (ref 35–45)
GFR SERPL CREATININE-BSD FRML MDRD: 32 ML/MIN/1.73M2
GLUCOSE BLD-MCNC: 161 MG/DL (ref 70–108)
GLUCOSE BLD-MCNC: 162 MG/DL (ref 70–108)
GLUCOSE BLD-MCNC: 164 MG/DL (ref 70–108)
GLUCOSE BLD-MCNC: 171 MG/DL (ref 70–108)
GLUCOSE BLD-MCNC: 172 MG/DL (ref 70–108)
HCT VFR BLD CALC: 26.4 % (ref 42–52)
HEMOGLOBIN: 7.9 GM/DL (ref 14–18)
IMMATURE GRANS (ABS): 0.59 THOU/MM3 (ref 0–0.07)
IMMATURE GRANULOCYTES: 5.5 %
LACTIC ACID: 0.5 MMOL/L (ref 0.5–2.2)
LYMPHOCYTES # BLD: 8.6 %
LYMPHOCYTES ABSOLUTE: 0.9 THOU/MM3 (ref 1–4.8)
MCH RBC QN AUTO: 28.1 PG (ref 26–33)
MCHC RBC AUTO-ENTMCNC: 29.9 GM/DL (ref 32.2–35.5)
MCV RBC AUTO: 94 FL (ref 80–94)
MONOCYTES # BLD: 6.3 %
MONOCYTES ABSOLUTE: 0.7 THOU/MM3 (ref 0.4–1.3)
NUCLEATED RED BLOOD CELLS: 0 /100 WBC
PLATELET # BLD: 441 THOU/MM3 (ref 130–400)
PMV BLD AUTO: 10.4 FL (ref 9.4–12.4)
POTASSIUM SERPL-SCNC: 3.3 MEQ/L (ref 3.5–5.2)
POTASSIUM SERPL-SCNC: 3.3 MEQ/L (ref 3.5–5.2)
POTASSIUM SERPL-SCNC: 3.5 MEQ/L (ref 3.5–5.2)
PROCALCITONIN: 0.67 NG/ML (ref 0.01–0.09)
RBC # BLD: 2.81 MILL/MM3 (ref 4.7–6.1)
SEG NEUTROPHILS: 71.5 %
SEGMENTED NEUTROPHILS ABSOLUTE COUNT: 7.7 THOU/MM3 (ref 1.8–7.7)
SODIUM BLD-SCNC: 143 MEQ/L (ref 135–145)
TOTAL PROTEIN: 6.1 G/DL (ref 6.1–8)
WBC # BLD: 10.7 THOU/MM3 (ref 4.8–10.8)

## 2020-10-14 PROCEDURE — 94770 HC ETCO2 MONITOR DAILY: CPT

## 2020-10-14 PROCEDURE — 6360000002 HC RX W HCPCS: Performed by: NURSE PRACTITIONER

## 2020-10-14 PROCEDURE — 84132 ASSAY OF SERUM POTASSIUM: CPT

## 2020-10-14 PROCEDURE — 36415 COLL VENOUS BLD VENIPUNCTURE: CPT

## 2020-10-14 PROCEDURE — 2700000000 HC OXYGEN THERAPY PER DAY

## 2020-10-14 PROCEDURE — 2580000003 HC RX 258: Performed by: NURSE PRACTITIONER

## 2020-10-14 PROCEDURE — 99291 CRITICAL CARE FIRST HOUR: CPT | Performed by: INTERNAL MEDICINE

## 2020-10-14 PROCEDURE — 2500000003 HC RX 250 WO HCPCS: Performed by: INTERNAL MEDICINE

## 2020-10-14 PROCEDURE — 85025 COMPLETE CBC W/AUTO DIFF WBC: CPT

## 2020-10-14 PROCEDURE — 82948 REAGENT STRIP/BLOOD GLUCOSE: CPT

## 2020-10-14 PROCEDURE — C9113 INJ PANTOPRAZOLE SODIUM, VIA: HCPCS | Performed by: INTERNAL MEDICINE

## 2020-10-14 PROCEDURE — 2100000000 HC CCU R&B

## 2020-10-14 PROCEDURE — 94761 N-INVAS EAR/PLS OXIMETRY MLT: CPT

## 2020-10-14 PROCEDURE — 6360000002 HC RX W HCPCS: Performed by: STUDENT IN AN ORGANIZED HEALTH CARE EDUCATION/TRAINING PROGRAM

## 2020-10-14 PROCEDURE — 2580000003 HC RX 258: Performed by: INTERNAL MEDICINE

## 2020-10-14 PROCEDURE — 6370000000 HC RX 637 (ALT 250 FOR IP): Performed by: INTERNAL MEDICINE

## 2020-10-14 PROCEDURE — 83605 ASSAY OF LACTIC ACID: CPT

## 2020-10-14 PROCEDURE — 94003 VENT MGMT INPAT SUBQ DAY: CPT

## 2020-10-14 PROCEDURE — 94640 AIRWAY INHALATION TREATMENT: CPT

## 2020-10-14 PROCEDURE — 36592 COLLECT BLOOD FROM PICC: CPT

## 2020-10-14 PROCEDURE — 80053 COMPREHEN METABOLIC PANEL: CPT

## 2020-10-14 PROCEDURE — 6360000002 HC RX W HCPCS: Performed by: INTERNAL MEDICINE

## 2020-10-14 PROCEDURE — 84145 PROCALCITONIN (PCT): CPT

## 2020-10-14 PROCEDURE — 2580000003 HC RX 258: Performed by: STUDENT IN AN ORGANIZED HEALTH CARE EDUCATION/TRAINING PROGRAM

## 2020-10-14 PROCEDURE — 6370000000 HC RX 637 (ALT 250 FOR IP): Performed by: NURSE PRACTITIONER

## 2020-10-14 PROCEDURE — 6370000000 HC RX 637 (ALT 250 FOR IP): Performed by: HOSPITALIST

## 2020-10-14 PROCEDURE — 99232 SBSQ HOSP IP/OBS MODERATE 35: CPT | Performed by: INTERNAL MEDICINE

## 2020-10-14 RX ORDER — SCOLOPAMINE TRANSDERMAL SYSTEM 1 MG/1
1 PATCH, EXTENDED RELEASE TRANSDERMAL
Status: DISCONTINUED | OUTPATIENT
Start: 2020-10-14 | End: 2020-10-15

## 2020-10-14 RX ADMIN — PIPERACILLIN AND TAZOBACTAM 3.38 G: 3; .375 INJECTION, POWDER, LYOPHILIZED, FOR SOLUTION INTRAVENOUS at 10:01

## 2020-10-14 RX ADMIN — FOLIC ACID 1 MG: 1 TABLET ORAL at 07:51

## 2020-10-14 RX ADMIN — Medication 2000 UNITS: at 07:52

## 2020-10-14 RX ADMIN — PROPOFOL 30 MCG/KG/MIN: 10 INJECTION, EMULSION INTRAVENOUS at 07:16

## 2020-10-14 RX ADMIN — Medication 15 ML: at 08:05

## 2020-10-14 RX ADMIN — SODIUM CHLORIDE, PRESERVATIVE FREE 10 ML: 5 INJECTION INTRAVENOUS at 21:04

## 2020-10-14 RX ADMIN — INSULIN LISPRO 2 UNITS: 100 INJECTION, SOLUTION INTRAVENOUS; SUBCUTANEOUS at 18:34

## 2020-10-14 RX ADMIN — PIPERACILLIN AND TAZOBACTAM 3.38 G: 3; .375 INJECTION, POWDER, LYOPHILIZED, FOR SOLUTION INTRAVENOUS at 00:56

## 2020-10-14 RX ADMIN — ARIPIPRAZOLE 15 MG: 15 TABLET ORAL at 07:52

## 2020-10-14 RX ADMIN — GLYCOPYRROLATE AND FORMOTEROL FUMARATE 2 PUFF: 9; 4.8 AEROSOL, METERED RESPIRATORY (INHALATION) at 10:23

## 2020-10-14 RX ADMIN — POTASSIUM CHLORIDE 20 MEQ: 400 INJECTION, SOLUTION INTRAVENOUS at 12:17

## 2020-10-14 RX ADMIN — POTASSIUM CHLORIDE 20 MEQ: 400 INJECTION, SOLUTION INTRAVENOUS at 08:30

## 2020-10-14 RX ADMIN — ENOXAPARIN SODIUM 40 MG: 40 INJECTION SUBCUTANEOUS at 08:18

## 2020-10-14 RX ADMIN — INSULIN GLARGINE 38 UNITS: 100 INJECTION, SOLUTION SUBCUTANEOUS at 21:11

## 2020-10-14 RX ADMIN — INSULIN LISPRO 2 UNITS: 100 INJECTION, SOLUTION INTRAVENOUS; SUBCUTANEOUS at 08:10

## 2020-10-14 RX ADMIN — MIDODRINE HYDROCHLORIDE 5 MG: 5 TABLET ORAL at 07:52

## 2020-10-14 RX ADMIN — DOXYCYCLINE 100 MG: 100 INJECTION, POWDER, LYOPHILIZED, FOR SOLUTION INTRAVENOUS at 21:50

## 2020-10-14 RX ADMIN — DOXYCYCLINE 100 MG: 100 INJECTION, POWDER, LYOPHILIZED, FOR SOLUTION INTRAVENOUS at 10:43

## 2020-10-14 RX ADMIN — POTASSIUM CHLORIDE 20 MEQ: 400 INJECTION, SOLUTION INTRAVENOUS at 07:16

## 2020-10-14 RX ADMIN — GABAPENTIN 800 MG: 400 CAPSULE ORAL at 21:07

## 2020-10-14 RX ADMIN — POTASSIUM CHLORIDE 20 MEQ: 400 INJECTION, SOLUTION INTRAVENOUS at 03:24

## 2020-10-14 RX ADMIN — INSULIN LISPRO 1 UNITS: 100 INJECTION, SOLUTION INTRAVENOUS; SUBCUTANEOUS at 21:10

## 2020-10-14 RX ADMIN — ALLOPURINOL 200 MG: 100 TABLET ORAL at 07:52

## 2020-10-14 RX ADMIN — CITALOPRAM 10 MG: 20 TABLET, FILM COATED ORAL at 07:51

## 2020-10-14 RX ADMIN — ENOXAPARIN SODIUM 40 MG: 40 INJECTION SUBCUTANEOUS at 21:01

## 2020-10-14 RX ADMIN — ATORVASTATIN CALCIUM 40 MG: 40 TABLET, FILM COATED ORAL at 21:07

## 2020-10-14 RX ADMIN — MIDODRINE HYDROCHLORIDE 5 MG: 5 TABLET ORAL at 18:29

## 2020-10-14 RX ADMIN — Medication 30 ML: at 07:51

## 2020-10-14 RX ADMIN — PSYLLIUM HUSK 1 PACKET: 3.4 GRANULE ORAL at 18:28

## 2020-10-14 RX ADMIN — GABAPENTIN 800 MG: 400 CAPSULE ORAL at 07:51

## 2020-10-14 RX ADMIN — INSULIN LISPRO 2 UNITS: 100 INJECTION, SOLUTION INTRAVENOUS; SUBCUTANEOUS at 11:32

## 2020-10-14 RX ADMIN — Medication 1 CAPSULE: at 09:51

## 2020-10-14 RX ADMIN — PROPOFOL 25 MCG/KG/MIN: 10 INJECTION, EMULSION INTRAVENOUS at 03:24

## 2020-10-14 RX ADMIN — ASPIRIN 81 MG: 81 TABLET, CHEWABLE ORAL at 07:51

## 2020-10-14 RX ADMIN — SODIUM CHLORIDE, PRESERVATIVE FREE 10 ML: 5 INJECTION INTRAVENOUS at 07:52

## 2020-10-14 RX ADMIN — POTASSIUM CHLORIDE 20 MEQ: 400 INJECTION, SOLUTION INTRAVENOUS at 04:11

## 2020-10-14 RX ADMIN — POTASSIUM CHLORIDE: 2 INJECTION, SOLUTION, CONCENTRATE INTRAVENOUS at 07:16

## 2020-10-14 RX ADMIN — PANTOPRAZOLE SODIUM 40 MG: 40 INJECTION, POWDER, FOR SOLUTION INTRAVENOUS at 07:51

## 2020-10-14 RX ADMIN — Medication 15 ML: at 21:00

## 2020-10-14 RX ADMIN — POTASSIUM CHLORIDE 20 MEQ: 400 INJECTION, SOLUTION INTRAVENOUS at 11:32

## 2020-10-14 RX ADMIN — GLYCOPYRROLATE AND FORMOTEROL FUMARATE 2 PUFF: 9; 4.8 AEROSOL, METERED RESPIRATORY (INHALATION) at 21:59

## 2020-10-14 RX ADMIN — DEXTROSE MONOHYDRATE 100 MG: 50 INJECTION, SOLUTION INTRAVENOUS at 18:28

## 2020-10-14 ASSESSMENT — PULMONARY FUNCTION TESTS
PIF_VALUE: 19
PIF_VALUE: 19
PIF_VALUE: 20
PIF_VALUE: 21
PIF_VALUE: 20

## 2020-10-14 NOTE — PROGRESS NOTES
Present to pt room for skin assessment. Pt laying in bed with primary nurse at side. Assists pt to turn to left side. Pt noted to have MASD, traumatic skin breakdown to kash rectal region from flexiseal and fecal incontinence. Area cleaned up. Stoma powder applied to open areas followed by zinc oxide. Pt on moisture wicking chux pad and sheet. Bilateral heels are dry and intact with no breakdown or redness. Pillow placed under right side. Pt has mason catheter in place. Pt on jennifer TUCKER support surface. Bilateral heels offloaded with pillows. Staff to continue to turn every 2 hours, keep HOB below 30 degrees. Bed in low, call light in reach. Wound type: Traumatic/MASD kash rectal wound  Wound size: 10.5cm x 7.5cm x 0.05cm  Undermining or Tunneling: none  Wound assessment: yellow, pink  Drainage amount: small  Drainage description: yellow  Odor: none  Margins: attached, irregular  Kash wound: dry, scarring  Exposed structure: none        Bilateral heels, no breakdown or redness    Thank you for allowing us to participate in the care of your patient. TIME   Wound/ostomy individual minutes  Time In: 1330  Time Out: 1340  Minutes: 10  Time does not include documentation.

## 2020-10-14 NOTE — PLAN OF CARE
Problem: Impaired respiratory status  Goal: Clear lung sounds  10/14/2020 0609 by Lima Bartlett RCP  Outcome: Ongoing     Problem: Impaired respiratory status  Goal: Patient will achieve/maintain normal respiratory rate/effort  10/14/2020 0609 by Lima Bartlett RCP  Outcome: Ongoing  Note: Patient currently on SBT 12/6 and 30%. Will keep patient on SBT until no longer tolerable or until patient is ready for extubation.

## 2020-10-14 NOTE — PLAN OF CARE
Problem: Impaired respiratory status  Goal: Clear lung sounds  10/13/2020 2113 by Moi Logan RCP  Outcome: Ongoing  Note:  No signs of distress noted.  Current treatment regimen appropriate

## 2020-10-14 NOTE — PROGRESS NOTES
Patient:  Holger Aguilar                    Unit/Bed:4B-04/004-A  OWF: [id number]            Vasquez Figueroa MD  Date of Admission: 9/23/2020     Assessment and Plan(All pulmonary edema, renal failure, PE, and respiratory failure diagnoses are acute in nature unless otherwise specified):        1. Acute hypoxemic respiratory failure: Patient extubated 10/2/2020.  On low-flow oxygen.  Although patient had ongoing radiographic improvement, he had progressive lethargy and subsequent obtundation.  Patient intubated 10/6/2020 for hypercarbic hypoxemic respiratory failure.  Doing well with spontaneous breathing trial.  Proceed with extubation. 2. Acute lung injury: Oxygenating well.  Ventilatory capacity remains weak. 3. Acute renal failure: Patient has no IV contrast exposure.  Acute deterioration 10/6/2020.  Renal ultrasound was unimpressive.  Fractional excretion of sodium was less than 1 and was consistent with volume contraction.  Patient had hemodilution with volume resuscitation with a drop in hemoglobin of 2 g.  This also goes along with volume contraction.  Pressor requirements have significantly diminished as patient volume resuscitated.  Appreciate nephrology's assistance.  Secondary to hypotension and acute tubular necrosis. 4. Sepsis: Secondary to pneumonia and COVID-19.  Associated with tachypnea and pneumonia, initially. Pneumonia was secondary to MRSA and was treated with vancomycin.  As fever developed on 10/5/2020, vancomycin was discontinued and doxycycline was initiated.  Antibiotics also expanded to include Zosyn.  Both doxycycline and Zosyn were initiated on 10/6/2020.  Urinalysis showed greater than 200 white blood cells.  I suspect new onset fever is related to urinary tract infection. Sputum PCR shows persistent MRSA, which may represent colonization.  Blood cultures are negative.  Patient has completed his course of Vergil Noreen he still remains on doxycycline.  Repeat sputum culture positive for staph aureus on 10/12/2020. Doxycycline continued on 10/14/2020. .  5. Pneumonia: Secondary to MRSA.  Status post 8 days of vancomycin.  Patient developed new onset fever while on vancomycin.  Vancomycin was subsequently discontinued and doxycycline was initiated.  Radiographically, there is not much change.  As fever developed while on vancomycin, vancomycin was discontinued.  PCR of pulmonary lavage still showed MRSA within the sputum.  This may represent contamination.  However, patient will continue with doxycycline for a total of 10 days.  Day #9/14 doxycycline. 6. Anemia: Was dilutional..  7. Type 2 diabetes mellitus: Subcutaneous insulin. 8. Diastolic heart failure: On diuretic  9. Obstructive sleep apnea: Secondary morbid obesity.  CPAP/BiPAP noncompliant. 10. Hyperlipidemia: On atorvastatin. 11. Hypertension:   Amlodipine discontinued. 12. Gout: On allopurinol. 13. COVID-19: Convalescent plasma received 9/24/2020.  Resolved. 14. Hypotension: Status post pressors and volume resuscitation. Screen for adrenal insufficiency was negative.  Continues to require pressors.  Etiology is secondary to volume contraction and sepsis.  Multifactorial.  Continue with volume resuscitation.  As patient undergoes volume resuscitation, pressor requirements are diminishing.  Suspect this is primarily volume contraction and not septic shock.  Resolved. .     CC: Respiratory failure  HPI: Patient is a 78-year-old morbidly obese black male lifetime non-smoker. Monica Amin has a history of morbid obesity associated with type 2 diabetes mellitus, prior alcohol abuse, hyperlipidemia, hypertension, hypogonadism, nonalcoholic steatohepatitis, obstructive sleep apnea, and gout.  Patient was hospitalized 9/6/2020 through 9/15/2020 with hypoxemic respiratory failure secondary to COVID-19 associated with diffuse bilateral infiltrates.  At that time, patient received Decadron, remdesivir, and danazol.  During hospitalization, he had issues with atrial fibrillation.  His insulin therapy required adjustment.  He was discharged home on subcutaneous Lovenox. Patient returned back to the emergency room on 9/23/2020 with increasing SOB and progressive hypoxia. CXR showed diffuse infiltrates.  Deteriorated and required intubation. Patient underwent bronchoscopy on 9/27/2020 which demonstrated no mucus production. Patient extubated 10/2/2020. Patient reintubated for progressive respiratory failure with progressive obtundation on 10/6/2020.  This was associated with acute oliguric renal failure.  Patient was intubated 10/6/2020, and underwent volume resuscitation.  Fractional excretion of sodium returned less than 1 which was consistent with prerenal azotemia.  After volume resuscitation, patient demonstrated that he was hemoconcentrated with a drop of 2 g in the hemoglobin. With volume resuscitation, urine output did improve. After patient was intubated on 10/6/2020, he underwent pulmonary lavage which showed MRSA on the PCR but no other organism.  However, patient was found to have greater than 200 white blood cells in the urine.  Patient was treated with Zosyn and doxycycline. Previously, patient had received vancomycin and developed fever while on vancomycin. Therefore vancomycin was not continued. Sputum subsequently grew staph aureus. Zosyn was discontinued but doxycycline continued on 10/14/2020. Patient did well with spontaneous breathing trial and was extubated 10/14/2020. For further details, please review the assessment and plan. ROS:  Patient  sedated to comfort on mechanical ventilator. PMH:  Per HPI  SHX: Lifetime non-smoker  FHX: Positive for heart disease.   Allergies: PCN  Medications:     sodium bicarbonate infusion 50 mL/hr at 10/14/20 0716    dextrose        psyllium  1 packet Oral Daily    pantoprazole  40 mg Intravenous Daily    CertaVite/Antioxidants  30 mL Oral Daily    anidulafungin  100 mg

## 2020-10-14 NOTE — PLAN OF CARE
Problem: Impaired respiratory status  Goal: Patient will achieve/maintain normal respiratory rate/effort  10/14/2020 1726 by Reema Juárez RCP  Outcome: Ongoing  Note: Vent setting optimized to achieve target tidal volume, respiratory rate and ideal oxygen saturations. The patient was able to tolerate SBT. RSBI  was 26 with EtCO2 of 35 and SpO2 of 100 on 30% FiO2. Spontanteous VT was 524 and RR 18 breaths/min. The patient was continued on SBT at this time. Patient not following commands/awake.

## 2020-10-14 NOTE — FLOWSHEET NOTE
Phone calls were made to both [facility] [employer]     10/14/20 4482   Encounter Summary   Services provided to: Family   Referral/Consult From: Rounding   Continue Visiting Yes  (10/14 F No anser/left message with dad)   Complexity of Encounter Low   Length of Encounter 15 minutes   Spiritual/Church   Type Spiritual support   , Pat's friend and to Carrie Nguyễn, [address], [address] dad. No answer on either line. Left a message with Carrie Nguyễn on his answering machine to let him know we continue to care and pray for pt's recovery.

## 2020-10-14 NOTE — PLAN OF CARE
Problem: Falls - Risk of:  Goal: Will remain free from falls  Description: Will remain free from falls  Outcome: Met This Shift  Note: Pt is sedated on vent with very limited mobility. Frequent checks and hourly rounds to assess needs. Problem: Falls - Risk of:  Goal: Absence of physical injury  Outcome: Met This Shift     Problem: Skin Integrity:  Goal: Will show no infection signs and symptoms  Description: Will show no infection signs and symptoms  Outcome: Ongoing     Problem: Skin Integrity:  Goal: Absence of new skin breakdown  Description: Absence of new skin breakdown  Outcome: Ongoing  Note: Pt has pressure sores rectal area. Turn and reposition every 2 hours and PRN. Keep heels and elbows elevated off bed. Watch for pressure from medical devices     Problem: Discharge Planning:  Goal: Discharged to appropriate level of care  Description: Discharged to appropriate level of care  Outcome: Ongoing     Problem: Urinary Retention:  Goal: Urinary elimination within specified parameters  Description: Urinary elimination within specified parameters  Outcome: Ongoing  Note: Dimas remains in place     Problem: Nutrition  Goal: Optimal nutrition therapy  10/13/2020 2100 by Igor Nicole RN  Outcome: Ongoing  Note: Tolerating tube feeding. Continue to monitor  10/13/2020 1526 by Carrie Schaefer RD, LD  Outcome: Ongoing     Problem: Impaired respiratory status  Goal: Patient will achieve/maintain normal respiratory rate/effort  10/13/2020 2100 by Igor Nicole RN  Outcome: Ongoing  Note: Pt tolerating spontaneous breathing trial all day today  10/13/2020 1300 by Kesha Nieves RCP  Outcome: Ongoing  Note: Vent setting optimized to achieve target tidal volume, respiratory rate and ideal oxygen saturations. The patient was able to tolerate SBT. RSBI  was 31 with EtCO2 of 34 and SpO2 of 100 on 30% FiO2. Spontanteous VT was 477 and RR 15 breaths/min. The patient has been on SBT since 0511.      Problem: Impaired respiratory status  Goal: Clear lung sounds  10/13/2020 2100 by Hortencia Orantes RN  Outcome: Ongoing  Note: Continues with rhonchi and crackles  10/13/2020 1300 by Andry Orantes RCP  Outcome: Ongoing  Note:  Patient lung sounds are considered normal for their current lung condition. No signs of distress noted. Current treatment regimen appropriate        Problem: Serum Glucose Level - Abnormal:  Goal: Ability to maintain appropriate glucose levels will improve  Description: Ability to maintain appropriate glucose levels will improve  Outcome: Ongoing  Note: Glucose levels remain slightly elevated. Cover with sliding scale insulin as ordered    Care plan reviewed with patient and family. Patient and family verbalize understanding of the plan of care and contribute to goal setting.

## 2020-10-14 NOTE — PROGRESS NOTES
[facility]         Renal Inpatient Follow-Up note         10/14/2020 8:41 AM    Pt Name:   Brooke Carrasco  YOB: 1970  Attending:   Thomas Burrell MD    Chief Complaint : Brooke Carrasco is a 44 y.o. male being followed by nephrology for acute kidney injury    Interval History :   Patient seen and examined by me. No distress  Intubated cannot accurately assess history or review of systems  Patient FiO2 is still at 30%  Urine output is better.   For possible extubation later today     Scheduled Medications :    pantoprazole  40 mg Intravenous Daily    CertaVite/Antioxidants  30 mL Oral Daily    anidulafungin  100 mg Intravenous Q24H    lactobacillus  1 capsule Oral Daily with breakfast    enoxaparin  40 mg Subcutaneous BID    insulin glargine  38 Units Subcutaneous Nightly    citalopram  10 mg Oral Daily    insulin lispro  0-12 Units Subcutaneous TID WC    insulin lispro  0-6 Units Subcutaneous Nightly    doxycycline (VIBRAMYCIN) IV  100 mg Intravenous Q12H    chlorhexidine  15 mL Mouth/Throat BID    midodrine  5 mg Oral TID WC    lidocaine 1 % injection  5 mL Intradermal Once    sodium chloride flush  10 mL Intravenous 2 times per day    piperacillin-tazobactam  3.375 g Intravenous Q8H    magnesium replacement protocol   Other RX Placeholder    phosphorus replacement protocol   Other RX Placeholder    calcium replacement protocol   Other RX Placeholder    polyethylene glycol  17 g Oral Every Other Day    allopurinol  200 mg Oral Daily    ARIPiprazole  15 mg Oral Daily    aspirin  81 mg Oral Daily    atorvastatin  40 mg Oral Nightly    Vitamin D  2,000 Units Oral Daily    folic acid  1 mg Oral Daily    gabapentin  800 mg Oral BID    glycopyrrolate-formoterol  2 puff Inhalation BID    senna  1 tablet Oral BID      sodium bicarbonate infusion 50 mL/hr at 10/14/20 0716    propofol 20 mcg/kg/min (10/14/20 0831)    norepinephrine Stopped (10/10/20 1539)    dextrose         Vitals :  BP 93/60   Pulse 75   Temp 98.8 °F (37.1 °C)   Resp 19   Ht 5' 8\" (1.727 m)   Wt (!) 318 lb 3.2 oz (144.3 kg)   SpO2 100%   BMI 48.38 kg/m²     24HR INTAKE/OUTPUT:      Intake/Output Summary (Last 24 hours) at 10/14/2020 0841  Last data filed at 10/14/2020 0500  Gross per 24 hour   Intake 4100 ml   Output 1925 ml   Net 2175 ml     Last 3 weights  Wt Readings from Last 3 Encounters:   10/14/20 (!) 318 lb 3.2 oz (144.3 kg)   09/15/20 281 lb 6.4 oz (127.6 kg)   08/21/20 (!) 306 lb 6.4 oz (139 kg)           Physical Exam : Due to COVID-19 situation termination is limited exam from outside the room  General Appearance: Obese well-nourished no distress  Mouth/Throat: ET tube in place  CNS-he is apparently responsive and much more awake and alert  Psych-not agitated  Abdomen: Appears slightly distended  Musculoskeletal:  Edema -not significant         Last 3 CBC   Recent Labs     10/12/20  0500 10/13/20  0349 10/14/20  0600   WBC 19.9* 12.2* 10.7   RBC 2.95* 2.60* 2.81*   HGB 8.5* 7.5* 7.9*   HCT 28.4* 24.6* 26.4*    363 441*     Last 3 CMP  Recent Labs     10/12/20  0500 10/13/20  0349  10/13/20  2000 10/14/20  0100 10/14/20  0600    142  --   --   --  143   K 3.8 3.1*   < > 3.2* 3.3* 3.3*   * 113*  --   --   --  115*   CO2 16* 16*  --   --   --  16*   BUN 42* 38*  --   --   --  35*   CREATININE 3.0* 2.7*  --   --   --  2.6*   CALCIUM 9.9 9.8  --   --   --  9.8   LABALBU 2.8* 2.5*  --   --   --  2.7*   BILITOT 0.4 0.3  --   --   --  0.3    < > = values in this interval not displayed. ASSESSMENT / Plan   1 Renal -acute kidney injury most likely due to septic ATN/hypotension  ? Improving with some IV hydration . Currently at 2.6  ? Continue IV fluids and monitor renal function    2 Electrolytes -hypokalemia- being replaced. 3 Metabolic acidosis with some respiratory alkalosis as well.  continue IV bicarbonate check a venous pH in the morning  4 Anemia- S/P post rectal clot. Maintaining stable  5 Hypotension mostly due to sepsis/volume depletion. Off pressors . overall doing stable  6 Hx of diabetes mellitus  7 Hx of COVID-19 pneumonia  8 Ventilator dependent respiratory failure - fluctuating FiO2, for possible extubation today  9 Hx of diastolic dysfunction volume status reasonable closely follow  10 Meds reviewed and discussed with the nursing staff and ICU team    EMELY Naqvi D.  [facility].

## 2020-10-14 NOTE — PLAN OF CARE
Problem: Nutrition  Goal: Optimal nutrition therapy  Outcome: Ongoing   Nutrition Problem #1: Inadequate oral intake  Intervention: Food and/or Nutrient Delivery: Continue NPO, Modify Current Tube Feeding, Vitamin Supplement  Nutritional Goals: Patient will tolerate EN adequate for active late phase covid infection

## 2020-10-14 NOTE — CARE COORDINATION
10/14/20, 11:36 AM EDT    DISCHARGE ON GOING [address] day: 21  Location: -06/006-A Reason for admit: Acute respiratory failure with hypoxemia ([facility] [address] [J96.01]  Acute respiratory failure with hypoxia ([facility] [address] [J96.01]   Procedure: 9/23 CXR: Bilateral pulmonary opacification worse than on previous study dated 10 September 2020. This may represent worsening inflammatory process, possibly Covid 19 infection; borderline cardiomegaly  9/23 Intubated  9/24 CVC left subclavian - 9/30 removed  6/42 PICC right basilic  53/6 Extubated to bipap  10/6 Reintubated  10/6 Renal US: Negative  10/7 CT Abd/pelvis: Bilateral lower lobe pneumonia. Known Covid; Distended colon with fluid, air and stool  10/8 CXR: No acute findings  10/11  CXR -  Similar bilateral ill-defined pneumonia.   10/12 CXR-Progressive bilateral consolidations or ARDS. Treatment Plan of Care: remains on vent. PEEP 6, FiO2 30% 0.5 liters O2 with saturations 100%. Tmax 98.8. Levophed drip, Propofol drip, bicarb drip, Eraxis drip, electrolyte replacement protocols, IV antibiotics, diabetes management, IV Protonix. Hgb 7.9. Dietitian following,, tube feeds of Vital 1.2 via OG tube. Barriers to Discharge: medical stability. PCP: Rafia Miller MD  Readmission Risk Score: 53%  Patient Goals/Plan/Treatment Preferences: From home alone. Plan is possible TCU vs LTACH. SW following. PT/OT when appropriate.

## 2020-10-14 NOTE — FLOWSHEET NOTE
During rounding on this unit, I placed a phone called to patient's family at [address] [facility] to provide spiritual and emotional encouragement. There is no answer on the other end of the phone. I did not leave a message. After hanging up, I offered prayer for healing, peace and strength for patient and his family.  will follow up during next rounding.

## 2020-10-14 NOTE — PROGRESS NOTES
Comprehensive Nutrition Assessment    Type and Reason for Visit:  Reassess(TF management)    Nutrition Recommendations/Plan:   Vital 1.2 at 20 ml/hr  Increase Proteinex 2GO (2.5 oz liquid protein bottle) to 4x daily - monitoring renal status  Free water per MD  Continue culturelle for probiotic benefits with loose stools  Metamucil added today and senna stopped    Nutrition Assessment:    Pt. nutritionally compromised AEB tolerating trophic TF however loose stools. Remains at risk for further nutritional compromise r/t admitted with acute respiratory failure with hypoxemia, +covid, morbidly obese, altered GI function (recent large clot from rectal tube site), elevated renal labs, elevated Hgb A1C of 9.9, and underlying medical condition (hx: CAD, DM, GERD, Alcohol abuse, HLD, HTN, Vitamin D deficiency).  Nutrition recommendations/interventions as per above. Malnutrition Assessment:  Malnutrition Status:  Insufficient data(+covid)    Context:  Acute Illness     Findings of the 6 clinical characteristics of malnutrition:  Energy Intake:  Mild decrease in energy intake (Comment)(good appetite or on EN at goal since admit)  Weight Loss:  (16# or 5% in 2 months)     Body Fat Loss:  Unable to assess(+covid patient)     Muscle Mass Loss:  Unable to assess(+covid)    Fluid Accumulation:  1 - Mild Extremities   Strength:  Not Performed    Estimated Daily Nutrient Needs:  Energy (kcal):  0057-4143 (30-32 kcals/kg IBW in active late phase);  Weight Used for Energy Requirements:  Ideal(70 kg - ideal weight)     Protein (g):  ~140 gms (2/kgm IBW) as renal status allows; Weight Used for Protein Requirements:  Ideal(70 kg)        Fluid (ml/day):  per MD; Weight Used for Fluid Requirements:  (n/a)      Nutrition Related Findings:  covid; reintubated 10/6; rectal tube removed 10/12 d/t large blood clot at site of rectal tube - GI consulted and had overinflated balloon likely resulting in an ulcer; tolerating TF this am at 20ml/hour but has loose stools - per RN \"squirting all over when we move him\"; RD spoke with Dr. Héctor Norris and starting [facility] today along with DC of scheduled senna (Pt. received 10/13); glucose 171  BUN 35  creatinine 2.6  MAP 73; abd soft; BM x9 past 24h; meds include MVI, IV doxycycline, folic acid, lantus, humalog, vitamin D, zosyn, culturelle, diprivan      Wounds:  Stage III(perineum; skin tear scrotum)       Current Nutrition Therapies:    Current Tube Feeding (TF) Orders:  · Feeding Route: Orogastric(OGT placed 9/23/20)  · Formula: Semi-Elemental(Vital 1.2 started 10/6)  · Schedule: Continuous(20ml/hour)  · Additives/Modulars: Protein(2.5ounce liquid protein bottle increased to 4x daily 10/14)  · Water Flushes: per MD  · Goal TF & Flush Orders Provides: 746 kcals (9478 with diprivan lipids), 140 gms protein, 53 gms cho, 2 gms fiber, 389ml H20, 810mg K+, 405mg phosphorus/24h    Additional Calorie Sources:   diprivan at 15.4ml/hour provides 407 lipid kcals    Anthropometric Measures:  · Height: 5' 8\" (172.7 cm)  · Current Body Weight: 318 lb (144.2 kg)(10/14 with +2 edema)   · Admission Body Weight: 285 lb (129.3 kg)(9/23/20, stated, +1 BLE and BUE edema)    · Usual Body Weight: 306 lb (138.8 kg)(EM[facility] 6/29/20[facility]  [facility]     · Ideal Body Weight: 154 lbs;   · BMI: 48.4  · BMI Categories: Obese Class 3 (BMI 40.0 or greater)(48.04)       Nutrition Diagnosis:   · Inadequate oral intake related to impaired respiratory function as evidenced by NPO or clear liquid status due to medical condition, intubation, nutrition support - enteral nutrition      Nutrition Interventions:   Food and/or Nutrient Delivery:  Continue NPO, Continue Current Tube Feeding, Vitamin Supplement  Nutrition Education/Counseling:  No recommendation at this time   Coordination of Nutrition Care:  Continued Inpatient Monitoring, Interdisciplinary Rounds    Goals:  Patient will tolerate EN adequate for active late phase covid infection       Nutrition Monitoring and Evaluation:   Behavioral-Environmental Outcomes:  (n/a)   Food/Nutrient Intake Outcomes:  Enteral Nutrition Intake/Tolerance  Physical Signs/Symptoms Outcomes:  Biochemical Data, GI Status, Fluid Status or Edema, Hemodynamic Status, Weight, Skin     Discharge Planning:     Too soon to determine     Electronically signed by Allen Lobo RD, LD on 10/14/20 at 3:05 PM EDT    Contact: [phone]

## 2020-10-14 NOTE — FLOWSHEET NOTE
Essence Merino friend called back. He wants continued prayer for his friend. We had a short conversation. Follow up will continue. 10/14/20 1613   Encounter Summary   Services provided to: Family   Referral/Consult From: Narcisa Bang  of [employer]   Continue Visiting Yes  (10/14 f)   Complexity of Encounter Moderate   Length of Encounter 15 minutes   Spiritual/Congregation   Type Spiritual support   Care Plan:  Continue spiritual and emotional care for patient and family. Including prayers.

## 2020-10-15 LAB
ALBUMIN SERPL-MCNC: 2.6 G/DL (ref 3.5–5.1)
ALP BLD-CCNC: 90 U/L (ref 38–126)
ALT SERPL-CCNC: 29 U/L (ref 11–66)
ANION GAP SERPL CALCULATED.3IONS-SCNC: 10 MEQ/L (ref 8–16)
AST SERPL-CCNC: 20 U/L (ref 5–40)
BASE EXCESS MIXED: -7.7 MMOL/L (ref -2–3)
BASOPHILS # BLD: 0.5 %
BASOPHILS ABSOLUTE: 0.1 THOU/MM3 (ref 0–0.1)
BILIRUB SERPL-MCNC: 0.3 MG/DL (ref 0.3–1.2)
BUN BLDV-MCNC: 31 MG/DL (ref 7–22)
CALCIUM SERPL-MCNC: 10.1 MG/DL (ref 8.5–10.5)
CHLORIDE BLD-SCNC: 118 MEQ/L (ref 98–111)
CO2: 16 MEQ/L (ref 23–33)
COLLECTED BY:: ABNORMAL
CREAT SERPL-MCNC: 2.4 MG/DL (ref 0.4–1.2)
DEVICE: ABNORMAL
EOSINOPHIL # BLD: 7.4 %
EOSINOPHILS ABSOLUTE: 0.8 THOU/MM3 (ref 0–0.4)
ERYTHROCYTE [DISTWIDTH] IN BLOOD BY AUTOMATED COUNT: 17.4 % (ref 11.5–14.5)
ERYTHROCYTE [DISTWIDTH] IN BLOOD BY AUTOMATED COUNT: 58.6 FL (ref 35–45)
FIO2, MIXED VENOUS: 30
GFR SERPL CREATININE-BSD FRML MDRD: 35 ML/MIN/1.73M2
GLUCOSE BLD-MCNC: 123 MG/DL (ref 70–108)
GLUCOSE BLD-MCNC: 128 MG/DL (ref 70–108)
GLUCOSE BLD-MCNC: 148 MG/DL (ref 70–108)
GLUCOSE BLD-MCNC: 162 MG/DL (ref 70–108)
GRAM STAIN RESULT: ABNORMAL
HCO3, MIXED: 19 MMOL/L (ref 23–28)
HCT VFR BLD CALC: 27 % (ref 42–52)
HEMOGLOBIN: 8.1 GM/DL (ref 14–18)
IMMATURE GRANS (ABS): 0.54 THOU/MM3 (ref 0–0.07)
IMMATURE GRANULOCYTES: 5.1 %
LACTIC ACID: 0.9 MMOL/L (ref 0.5–2.2)
LYMPHOCYTES # BLD: 10.3 %
LYMPHOCYTES ABSOLUTE: 1.1 THOU/MM3 (ref 1–4.8)
MCH RBC QN AUTO: 27.9 PG (ref 26–33)
MCHC RBC AUTO-ENTMCNC: 30 GM/DL (ref 32.2–35.5)
MCV RBC AUTO: 93.1 FL (ref 80–94)
MODE: ABNORMAL
MONOCYTES # BLD: 6.1 %
MONOCYTES ABSOLUTE: 0.6 THOU/MM3 (ref 0.4–1.3)
NUCLEATED RED BLOOD CELLS: 1 /100 WBC
O2 SAT, MIXED: 62 %
ORGANISM: ABNORMAL
PCO2, MIXED VENOUS: 41 MMHG (ref 41–51)
PH VENOUS: 7.22 (ref 7.31–7.41)
PH, MIXED: 7.27 (ref 7.31–7.41)
PLATELET # BLD: 476 THOU/MM3 (ref 130–400)
PMV BLD AUTO: 10.1 FL (ref 9.4–12.4)
PO2 MIXED: 37 MMHG (ref 25–40)
POTASSIUM SERPL-SCNC: 3 MEQ/L (ref 3.5–5.2)
PROCALCITONIN: 0.53 NG/ML (ref 0.01–0.09)
RBC # BLD: 2.9 MILL/MM3 (ref 4.7–6.1)
RESPIRATORY CULTURE: ABNORMAL
RESPIRATORY CULTURE: ABNORMAL
SEG NEUTROPHILS: 70.6 %
SEGMENTED NEUTROPHILS ABSOLUTE COUNT: 7.5 THOU/MM3 (ref 1.8–7.7)
SODIUM BLD-SCNC: 144 MEQ/L (ref 135–145)
TOTAL PROTEIN: 6.1 G/DL (ref 6.1–8)
WBC # BLD: 10.6 THOU/MM3 (ref 4.8–10.8)

## 2020-10-15 PROCEDURE — 6360000002 HC RX W HCPCS: Performed by: INTERNAL MEDICINE

## 2020-10-15 PROCEDURE — 82803 BLOOD GASES ANY COMBINATION: CPT

## 2020-10-15 PROCEDURE — 80053 COMPREHEN METABOLIC PANEL: CPT

## 2020-10-15 PROCEDURE — 6370000000 HC RX 637 (ALT 250 FOR IP): Performed by: INTERNAL MEDICINE

## 2020-10-15 PROCEDURE — 94770 HC ETCO2 MONITOR DAILY: CPT

## 2020-10-15 PROCEDURE — 36415 COLL VENOUS BLD VENIPUNCTURE: CPT

## 2020-10-15 PROCEDURE — 2580000003 HC RX 258: Performed by: STUDENT IN AN ORGANIZED HEALTH CARE EDUCATION/TRAINING PROGRAM

## 2020-10-15 PROCEDURE — 36592 COLLECT BLOOD FROM PICC: CPT

## 2020-10-15 PROCEDURE — 82948 REAGENT STRIP/BLOOD GLUCOSE: CPT

## 2020-10-15 PROCEDURE — 2580000003 HC RX 258: Performed by: INTERNAL MEDICINE

## 2020-10-15 PROCEDURE — 2500000003 HC RX 250 WO HCPCS: Performed by: INTERNAL MEDICINE

## 2020-10-15 PROCEDURE — 84145 PROCALCITONIN (PCT): CPT

## 2020-10-15 PROCEDURE — 6370000000 HC RX 637 (ALT 250 FOR IP): Performed by: HOSPITALIST

## 2020-10-15 PROCEDURE — 94660 CPAP INITIATION&MGMT: CPT

## 2020-10-15 PROCEDURE — 2100000000 HC CCU R&B

## 2020-10-15 PROCEDURE — 6360000002 HC RX W HCPCS: Performed by: STUDENT IN AN ORGANIZED HEALTH CARE EDUCATION/TRAINING PROGRAM

## 2020-10-15 PROCEDURE — 6360000002 HC RX W HCPCS: Performed by: NURSE PRACTITIONER

## 2020-10-15 PROCEDURE — 94640 AIRWAY INHALATION TREATMENT: CPT

## 2020-10-15 PROCEDURE — C9113 INJ PANTOPRAZOLE SODIUM, VIA: HCPCS | Performed by: INTERNAL MEDICINE

## 2020-10-15 PROCEDURE — 94761 N-INVAS EAR/PLS OXIMETRY MLT: CPT

## 2020-10-15 PROCEDURE — 99291 CRITICAL CARE FIRST HOUR: CPT | Performed by: INTERNAL MEDICINE

## 2020-10-15 PROCEDURE — 99233 SBSQ HOSP IP/OBS HIGH 50: CPT | Performed by: INTERNAL MEDICINE

## 2020-10-15 PROCEDURE — 82800 BLOOD PH: CPT

## 2020-10-15 PROCEDURE — 85025 COMPLETE CBC W/AUTO DIFF WBC: CPT

## 2020-10-15 PROCEDURE — 83605 ASSAY OF LACTIC ACID: CPT

## 2020-10-15 PROCEDURE — 94003 VENT MGMT INPAT SUBQ DAY: CPT

## 2020-10-15 PROCEDURE — 2580000003 HC RX 258: Performed by: NURSE PRACTITIONER

## 2020-10-15 RX ORDER — POTASSIUM CHLORIDE 29.8 MG/ML
40 INJECTION INTRAVENOUS DAILY
Status: DISCONTINUED | OUTPATIENT
Start: 2020-10-15 | End: 2020-10-15 | Stop reason: SDUPTHER

## 2020-10-15 RX ORDER — POTASSIUM CHLORIDE 29.8 MG/ML
20 INJECTION INTRAVENOUS
Status: DISPENSED | OUTPATIENT
Start: 2020-10-15 | End: 2020-10-15

## 2020-10-15 RX ORDER — MULTIVIT/FOLIC ACID/HERBAL 275 400-200/30
30 LIQUID (ML) ORAL DAILY
Status: DISCONTINUED | OUTPATIENT
Start: 2020-10-15 | End: 2020-10-17

## 2020-10-15 RX ORDER — POTASSIUM CHLORIDE 29.8 MG/ML
20 INJECTION INTRAVENOUS DAILY
Status: DISCONTINUED | OUTPATIENT
Start: 2020-10-15 | End: 2020-10-16

## 2020-10-15 RX ADMIN — DOXYCYCLINE 100 MG: 100 INJECTION, POWDER, LYOPHILIZED, FOR SOLUTION INTRAVENOUS at 23:05

## 2020-10-15 RX ADMIN — SODIUM CHLORIDE, PRESERVATIVE FREE 10 ML: 5 INJECTION INTRAVENOUS at 20:04

## 2020-10-15 RX ADMIN — INSULIN LISPRO 2 UNITS: 100 INJECTION, SOLUTION INTRAVENOUS; SUBCUTANEOUS at 09:32

## 2020-10-15 RX ADMIN — DOXYCYCLINE 100 MG: 100 INJECTION, POWDER, LYOPHILIZED, FOR SOLUTION INTRAVENOUS at 11:25

## 2020-10-15 RX ADMIN — GLYCOPYRROLATE AND FORMOTEROL FUMARATE 2 PUFF: 9; 4.8 AEROSOL, METERED RESPIRATORY (INHALATION) at 08:18

## 2020-10-15 RX ADMIN — GLYCOPYRROLATE AND FORMOTEROL FUMARATE 2 PUFF: 9; 4.8 AEROSOL, METERED RESPIRATORY (INHALATION) at 18:20

## 2020-10-15 RX ADMIN — ALLOPURINOL 200 MG: 100 TABLET ORAL at 09:24

## 2020-10-15 RX ADMIN — POTASSIUM CHLORIDE 20 MEQ: 400 INJECTION, SOLUTION INTRAVENOUS at 14:47

## 2020-10-15 RX ADMIN — POTASSIUM CHLORIDE 20 MEQ: 29.8 INJECTION, SOLUTION INTRAVENOUS at 11:25

## 2020-10-15 RX ADMIN — DEXTROSE MONOHYDRATE 100 MG: 50 INJECTION, SOLUTION INTRAVENOUS at 13:21

## 2020-10-15 RX ADMIN — POTASSIUM CHLORIDE: 2 INJECTION, SOLUTION, CONCENTRATE INTRAVENOUS at 18:47

## 2020-10-15 RX ADMIN — POTASSIUM CHLORIDE 20 MEQ: 29.8 INJECTION, SOLUTION INTRAVENOUS at 19:59

## 2020-10-15 RX ADMIN — ARIPIPRAZOLE 15 MG: 15 TABLET ORAL at 09:24

## 2020-10-15 RX ADMIN — GABAPENTIN 800 MG: 400 CAPSULE ORAL at 09:24

## 2020-10-15 RX ADMIN — Medication 15 ML: at 09:23

## 2020-10-15 RX ADMIN — ASPIRIN 81 MG: 81 TABLET, CHEWABLE ORAL at 09:23

## 2020-10-15 RX ADMIN — Medication 2000 UNITS: at 09:24

## 2020-10-15 RX ADMIN — ENOXAPARIN SODIUM 40 MG: 40 INJECTION SUBCUTANEOUS at 20:03

## 2020-10-15 RX ADMIN — SODIUM CHLORIDE, PRESERVATIVE FREE 10 ML: 5 INJECTION INTRAVENOUS at 09:25

## 2020-10-15 RX ADMIN — POTASSIUM CHLORIDE: 2 INJECTION, SOLUTION, CONCENTRATE INTRAVENOUS at 01:45

## 2020-10-15 RX ADMIN — INSULIN LISPRO 2 UNITS: 100 INJECTION, SOLUTION INTRAVENOUS; SUBCUTANEOUS at 13:07

## 2020-10-15 RX ADMIN — Medication 1 CAPSULE: at 09:24

## 2020-10-15 RX ADMIN — FOLIC ACID 1 MG: 1 TABLET ORAL at 09:24

## 2020-10-15 RX ADMIN — POTASSIUM CHLORIDE 20 MEQ: 29.8 INJECTION, SOLUTION INTRAVENOUS at 18:39

## 2020-10-15 RX ADMIN — PANTOPRAZOLE SODIUM 40 MG: 40 INJECTION, POWDER, FOR SOLUTION INTRAVENOUS at 09:24

## 2020-10-15 RX ADMIN — ENOXAPARIN SODIUM 40 MG: 40 INJECTION SUBCUTANEOUS at 09:23

## 2020-10-15 RX ADMIN — POTASSIUM CHLORIDE 20 MEQ: 29.8 INJECTION, SOLUTION INTRAVENOUS at 13:02

## 2020-10-15 RX ADMIN — PSYLLIUM HUSK 1 PACKET: 3.4 GRANULE ORAL at 09:24

## 2020-10-15 ASSESSMENT — PULMONARY FUNCTION TESTS
PIF_VALUE: 20
PIF_VALUE: 18
PIF_VALUE: 19

## 2020-10-15 NOTE — PROGRESS NOTES
[facility]         Renal Inpatient Follow-Up note         10/15/2020 2:50 PM    Pt Name:   Ela Feliciano  YOB: 1970  Attending:   Marsha Garcia MD    Chief Complaint : Ela Feliciano is a 44 y.o. male being followed by nephrology for acute kidney injury    Interval History :   Patient seen and examined by me. No distress  Extubated but confused to some degree. Urine output is ok.      Scheduled Medications :    multivitamin+  30 mL Oral Daily    psyllium  1 packet Oral Daily    pantoprazole  40 mg Intravenous Daily    anidulafungin  100 mg Intravenous Q24H    lactobacillus  1 capsule Oral Daily with breakfast    enoxaparin  40 mg Subcutaneous BID    insulin glargine  38 Units Subcutaneous Nightly    insulin lispro  0-12 Units Subcutaneous TID WC    insulin lispro  0-6 Units Subcutaneous Nightly    doxycycline (VIBRAMYCIN) IV  100 mg Intravenous Q12H    midodrine  5 mg Oral TID WC    lidocaine 1 % injection  5 mL Intradermal Once    sodium chloride flush  10 mL Intravenous 2 times per day    magnesium replacement protocol   Other RX Placeholder    phosphorus replacement protocol   Other RX Placeholder    calcium replacement protocol   Other RX Placeholder    allopurinol  200 mg Oral Daily    ARIPiprazole  15 mg Oral Daily    aspirin  81 mg Oral Daily    atorvastatin  40 mg Oral Nightly    Vitamin D  2,000 Units Oral Daily    folic acid  1 mg Oral Daily    gabapentin  800 mg Oral BID    glycopyrrolate-formoterol  2 puff Inhalation BID      sodium bicarbonate infusion 75 mL/hr at 10/15/20 0145    dextrose         Vitals :  /78   Pulse 100   Temp 99 °F (37.2 °C)   Resp 23   Ht 5' 8\" (1.727 m)   Wt (!) 318 lb 3.2 oz (144.3 kg)   SpO2 91%   BMI 48.38 kg/m²     24HR INTAKE/OUTPUT:      Intake/Output Summary (Last 24 hours) at 10/15/2020 1450  Last data filed at 10/15/2020 1315  Gross per 24 hour   Intake 3539.81 ml   Output 2175 ml   Net 1364.81 ml     Last 3 weights  Wt Readings from Last 3 Encounters:   10/14/20 (!) 318 lb 3.2 oz (144.3 kg)   09/15/20 281 lb 6.4 oz (127.6 kg)   08/21/20 (!) 306 lb 6.4 oz (139 kg)           Physical Exam : Due to COVID-19 situation termination is limited exam from outside the room  General Appearance: Obese well-nourished no distress  CNS-he is apparently responsive  Psych-not agitated  Abdomen: Appears slightly distended  Musculoskeletal:  Edema -not significant           Last 3 CBC   Recent Labs     10/13/20  0349 10/14/20  0600 10/15/20  0503   WBC 12.2* 10.7 10.6   RBC 2.60* 2.81* 2.90*   HGB 7.5* 7.9* 8.1*   HCT 24.6* 26.4* 27.0*    441* 476*     Last 3 CMP  Recent Labs     10/13/20  0349  10/14/20  0600 10/14/20  1005 10/15/20  0503     --  143  --  144   K 3.1*   < > 3.3* 3.5 3.0*   *  --  115*  --  118*   CO2 16*  --  16*  --  16*   BUN 38*  --  35*  --  31*   CREATININE 2.7*  --  2.6*  --  2.4*   CALCIUM 9.8  --  9.8  --  10.1   LABALBU 2.5*  --  2.7*  --  2.6*   BILITOT 0.3  --  0.3  --  0.3    < > = values in this interval not displayed. ASSESSMENT / Plan   1 Renal -acute kidney injury most likely due to septic ATN/hypotension  ? Improving with some IV hydration . Currently at 2.4  ? Continue IV fluids and monitor renal function  ? Prn diuretics    2 Electrolytes -hypokalemia- being replaced. will increase kcl in the IVF and also will add 40 kcl dialy  3 Metabolic acidosis  Ph 1.19 continue bicarb  4 Anemia- S/P post rectal clot. Maintaining stable  5 Hypotension resolved . 6 Hx of diabetes mellitus  7 Hx of COVID-19 pneumonia  8 Hx of diastolic dysfunction volume status reasonable closely follow  9 Meds reviewed and discussed with the nursing staff and ICU team    EMELY Laughlin D.  [facility].

## 2020-10-15 NOTE — PLAN OF CARE
Problem: Nutrition  Goal: Optimal nutrition therapy  Outcome: Ongoing   Nutrition Problem #1: Inadequate oral intake  Intervention: Food and/or Nutrient Delivery: (Start oral diet as able, if unable to start diet recommend restart EN)  Nutritional Goals: Patient will receive adequate nutrition in 1-4 days

## 2020-10-15 NOTE — CARE COORDINATION
10/15/20, 11:47 AM EDT    DISCHARGE ON GOING [address] day: 22  Location: -06/006-A Reason for admit: Acute respiratory failure with hypoxemia ([facility] [address] [J96.01]  Acute respiratory failure with hypoxia ([facility] [address] [J96.01]   Procedure:  9/23 CXR: Bilateral pulmonary opacification worse than on previous study dated 10 September 2020. This may represent worsening inflammatory process, possibly Covid 19 infection; borderline cardiomegaly  9/23 Intubated  9/24 CVC left subclavian - 9/30 removed  5/31 PICC right basilic  81/3 Extubated to bipap  10/6 Reintubated  10/6 Renal US: Negative  10/7 CT Abd/pelvis: Bilateral lower lobe pneumonia. Known Covid; Distended colon with fluid, air and stool  10/8 CXR: No acute findings  10/11  CXR -  Similar bilateral ill-defined pneumonia.   10/12 CXR-Progressive bilateral consolidations or ARDS. 10/15 Extubated  Treatment Plan of Care: Pt extubated, O2 at 3 liters nasal cannula with saturations at 98%. Tmax 99.9. Bicarb drip, procalcitonin 0.53, Hgb 8.1, creatinine 2.4, albuterol nebs, Eraxis drip, IV antibiotics, diabetes management, IV Protonix, electrolyte replacement protocols. Dietitian following, Nephrology following. PT/OT to eval and treat. Barriers to Discharge: medical stability. PCP: Joseph Pearson MD  Readmission Risk Score: 52%  Patient Goals/Plan/Treatment Preferences: from home. Plan is home with [facility] vs TCU. Will follow.

## 2020-10-15 NOTE — PLAN OF CARE
Pt remains on ventilator at this time; patient is currently in spont mode; starting to follow commands. Plan to extubate when patient becomes more awake.

## 2020-10-15 NOTE — PROGRESS NOTES
Patient:  Holger Aguilar                    Unit/Bed:4B-04/004-A  QPO: [id number]            Bonnie Camejo MD  Date of Admission: 9/23/2020     Assessment and Plan(All pulmonary edema, renal failure, PE, and respiratory failure diagnoses are acute in nature unless otherwise specified):        1. Acute hypoxemic respiratory failure: Patient extubated 10/2/2020.  On low-flow oxygen.  Although patient had ongoing radiographic improvement, he had progressive lethargy and subsequent obtundation.  Patient intubated 10/6/2020 for hypercarbic hypoxemic respiratory failure.  Doing well with spontaneous breathing trial.  Proceed with extubation. 2. Acute lung injury: Oxygenating well. 3. Acute renal failure: Patient has no IV contrast exposure.  Acute deterioration 10/6/2020.  Renal ultrasound was unimpressive.  Fractional excretion of sodium was less than 1 and was consistent with volume contraction.  Patient had hemodilution with volume resuscitation with a drop in hemoglobin of 2 g.  This also goes along with volume contraction.  Pressor requirements have significantly diminished as patient volume resuscitated.  Appreciate nephrology's assistance.  Secondary to hypotension and acute tubular necrosis. 4. Sepsis: Secondary to pneumonia and COVID-19.  Associated with tachypnea and pneumonia, initially. Pneumonia was secondary to MRSA and was treated with vancomycin.  As fever developed on 10/5/2020, vancomycin was discontinued and doxycycline was initiated.  Antibiotics also expanded to include Zosyn.  Both doxycycline and Zosyn were initiated on 10/6/2020.  Urinalysis showed greater than 200 white blood cells.  I suspect new onset fever is related to urinary tract infection. Sputum PCR shows persistent MRSA, which may represent colonization.  Blood cultures are negative.  Patient has completed his course of [facility] he still remains on doxycycline.  Repeat sputum culture positive for staph aureus on 10/12/2020. Doxycycline continued on 10/14/2020. .  5. Pneumonia: Secondary to MRSA.  Status post 8 days of vancomycin.  Patient developed new onset fever while on vancomycin.  Vancomycin was subsequently discontinued and doxycycline was initiated.  Radiographically, there is not much change.  As fever developed while on vancomycin, vancomycin was discontinued.  PCR of pulmonary lavage still showed MRSA within the sputum.  This may represent contamination.  However, patient will continue with doxycycline for a total of 10 days.  Day #10/14 doxycycline. 6. Anemia: Was dilutional..  7. Type 2 diabetes mellitus: Subcutaneous insulin. 8. Diastolic heart failure: On diuretic  9. Obstructive sleep apnea: Secondary morbid obesity.  CPAP/BiPAP noncompliant. 10. Hyperlipidemia: On atorvastatin. 11. Hypertension:   Amlodipine discontinued. 12. Gout: On allopurinol. 13. COVID-19: Convalescent plasma received 9/24/2020.  Resolved. 14. Hypotension: Status post pressors and volume resuscitation. Screen for adrenal insufficiency was negative.  Continues to require pressors.  Etiology is secondary to volume contraction and sepsis.  Multifactorial.  Continue with volume resuscitation.  As patient undergoes volume resuscitation, pressor requirements are diminishing.  Suspect this is primarily volume contraction and not septic shock.  Resolved. .     CC: Respiratory failure  HPI: Patient is a 69-year-old morbidly obese black male lifetime non-smoker. Ernesto Caicedo has a history of morbid obesity associated with type 2 diabetes mellitus, prior alcohol abuse, hyperlipidemia, hypertension, hypogonadism, nonalcoholic steatohepatitis, obstructive sleep apnea, and gout.  Patient was hospitalized 9/6/2020 through 9/15/2020 with hypoxemic respiratory failure secondary to COVID-19 associated with diffuse bilateral infiltrates.  At that time, patient received Decadron, remdesivir, and danazol.  During hospitalization, he had issues 100 mg Intravenous Q24H    lactobacillus  1 capsule Oral Daily with breakfast    enoxaparin  40 mg Subcutaneous BID    insulin glargine  38 Units Subcutaneous Nightly    insulin lispro  0-12 Units Subcutaneous TID     insulin lispro  0-6 Units Subcutaneous Nightly    doxycycline (VIBRAMYCIN) IV  100 mg Intravenous Q12H    midodrine  5 mg Oral TID     lidocaine 1 % injection  5 mL Intradermal Once    sodium chloride flush  10 mL Intravenous 2 times per day    magnesium replacement protocol   Other RX Placeholder    phosphorus replacement protocol   Other RX Placeholder    calcium replacement protocol   Other RX Placeholder    allopurinol  200 mg Oral Daily    ARIPiprazole  15 mg Oral Daily    aspirin  81 mg Oral Daily    atorvastatin  40 mg Oral Nightly    Vitamin D  2,000 Units Oral Daily    folic acid  1 mg Oral Daily    gabapentin  800 mg Oral BID    glycopyrrolate-formoterol  2 puff Inhalation BID     Vital Signs:   T: 99.9: P: 96: RR: 21: B/P: 158/89: FiO2: 30: O2 Sat: 100: I/O: 3934/2425  GCS:  10: Body mass index is 48.38 kg/m². Michael Cherry General:   Morbidly obese black male. HEENT:  normocephalic and atraumatic.  No scleral icterus. PERR  Neck: supple.  No Thyromegaly. Lungs: Clear to anterior auscultation.  Respirations unlabored. Cardiac: RRR.  No JVD. Abdomen: soft.  Nontender.  Large panniculus. Extremities:  No clubbing, cyanosis x 4. No lower extremity edema bilaterally. Vasculature: capillary refill < 3 seconds. Palpable dorsalis pedis pulses. Skin:  warm and dry. Psych:    Sedated on mechanical ventilator. Michael Cherry Lymph:  No supraclavicular adenopathy. Neurologic:  No focal deficit. No seizures.     Data: (All radiographs, tracings, PFTs, and imaging are personally viewed and interpreted unless otherwise noted). · Telemetry shows sinus rhythm. · Echocardiogram shows an ejection fraction of 60%. .  · Sputum collected 9/23/2020: Positive for MRSA.   · Sputum PCR collected 10/6/2020 is positive for MRSA. · Renal ultrasound report 10/6/2020 shows normal kidneys. · Urine positive for Candida glabrata. · Sputum culture collected 10/12/2020: Coagulase positive Staphylococcus. · Sodium 144, potassium 3, chloride 118, bicarb 16, BUN 31, creatinine 2.4, glucose 162. Albumin 2.6. White blood cell count 10.6, hemoglobin 8.1, platelets 397     CC time 35 minutes.  Time was discontiguous. Electronically signed by Gillian Wong M.D.

## 2020-10-15 NOTE — PROGRESS NOTES
Patient has been successfully weaned from Mechanical Ventilation. RSBI before extubation was 33 with EtCO2 of 35 and SpO2 of 93 on 25% FiO2. Patient extubated and placed on 3 liters/min via nasal cannula. Post extubation SpO2 is 98% with HR  96 bpm and RR 24 breaths/min. Patient had strong cough that was productive of white sputum. Extubation Well tolerated by patient. Blanchie Bevels

## 2020-10-15 NOTE — PROGRESS NOTES
2.4, POC: 172. Meds: ABT, folic acid, lantus, humalog, vitamin D, culturelle, psyllium, scopolamine, midodrine. Wounds:  Stage III(perineum; skin tear scrotum)       Current Nutrition Therapies:    DIET TUBE FEED CONTINUOUS/CYCLIC NPO; Semi-elemental (Vital 1.2); Orogastric; Continuous; 10; 20    Anthropometric Measures:  · Height: 5' 8\" (172.7 cm)  · Current Body Weight: 318 lb (144.2 kg)(10/14, bedscale, +2 non-pitting BLE edema)   · Admission Body Weight: 285 lb (129.3 kg)(9/23/20, stated, +1 BLE and BUE edema)    · Usual Body Weight: 306 lb (138.8 kg)(EMR, 6/29/20, actual.  299# 8/15/20, bedscale.)     · Ideal Body Weight: 154 lbs;    · BMI: 48.4  · Adjusted Body Weight:  ; No Adjustment   · BMI Categories: Obese Class 3 (BMI 40.0 or greater)(48.04)       Nutrition Diagnosis:   · Inadequate oral intake related to impaired respiratory function as evidenced by NPO or clear liquid status due to medical condition    Nutrition Interventions:   Food and/or Nutrient Delivery:  (Start oral diet as able, if unable to start diet recommend restart EN)  Nutrition Education/Counseling:  No recommendation at this time   Coordination of Nutrition Care:  Continued Inpatient Monitoring, Interdisciplinary Rounds    Goals:  Patient will receive adequate nutrition in 1-4 days       Nutrition Monitoring and Evaluation:   Behavioral-Environmental Outcomes:  (n/a)   Food/Nutrient Intake Outcomes:  (oral diet vs EN)  Physical Signs/Symptoms Outcomes:  Biochemical Data, Chewing or Swallowing, GI Status, Fluid Status or Edema, Hemodynamic Status, Skin, Weight     Discharge Planning:     Too soon to determine     Electronically signed by Deep Camejo RD, LD on 10/15/20 at 2:22 PM EDT    Contact: .[phone]

## 2020-10-15 NOTE — PROGRESS NOTES
Wound ostomy reconsulted for perirectal wound. Service currently following patient with weekly assessments. Staff to follow wound care orders.

## 2020-10-15 NOTE — PROGRESS NOTES
[facility] [address]  PHYSICAL THERAPY MISSED TREATMENT NOTE  [facility]    Date: 10/15/2020  Patient Name: Leti Booker        MRN: [id number]   : 1970  (44 y.o.)  Gender: male                REASON FOR MISSED TREATMENT:  Attempted PT evaluation this date. RN in room and approved therapy session. Unable to wake pt via calling his name, sternal rub, or squeezing his hand. Will attempt later when appropriate.  Yomaira Calvin, PT, DPT

## 2020-10-16 ENCOUNTER — APPOINTMENT (OUTPATIENT)
Dept: GENERAL RADIOLOGY | Age: 50
DRG: 870 | End: 2020-10-16
Payer: MEDICARE

## 2020-10-16 LAB
ALBUMIN SERPL-MCNC: 2.6 G/DL (ref 3.5–5.1)
ALP BLD-CCNC: 90 U/L (ref 38–126)
ALT SERPL-CCNC: 25 U/L (ref 11–66)
ANION GAP SERPL CALCULATED.3IONS-SCNC: 9 MEQ/L (ref 8–16)
AST SERPL-CCNC: 14 U/L (ref 5–40)
BASOPHILS # BLD: 0.5 %
BASOPHILS ABSOLUTE: 0 THOU/MM3 (ref 0–0.1)
BILIRUB SERPL-MCNC: 0.3 MG/DL (ref 0.3–1.2)
BUN BLDV-MCNC: 23 MG/DL (ref 7–22)
CALCIUM SERPL-MCNC: 10.3 MG/DL (ref 8.5–10.5)
CHLORIDE BLD-SCNC: 118 MEQ/L (ref 98–111)
CO2: 18 MEQ/L (ref 23–33)
CREAT SERPL-MCNC: 2.1 MG/DL (ref 0.4–1.2)
EOSINOPHIL # BLD: 7.2 %
EOSINOPHILS ABSOLUTE: 0.7 THOU/MM3 (ref 0–0.4)
ERYTHROCYTE [DISTWIDTH] IN BLOOD BY AUTOMATED COUNT: 17.9 % (ref 11.5–14.5)
ERYTHROCYTE [DISTWIDTH] IN BLOOD BY AUTOMATED COUNT: 61.1 FL (ref 35–45)
GFR SERPL CREATININE-BSD FRML MDRD: 40 ML/MIN/1.73M2
GLUCOSE BLD-MCNC: 122 MG/DL (ref 70–108)
GLUCOSE BLD-MCNC: 123 MG/DL (ref 70–108)
GLUCOSE BLD-MCNC: 129 MG/DL (ref 70–108)
GLUCOSE BLD-MCNC: 131 MG/DL (ref 70–108)
GLUCOSE BLD-MCNC: 249 MG/DL (ref 70–108)
HCT VFR BLD CALC: 25.8 % (ref 42–52)
HEMOGLOBIN: 7.6 GM/DL (ref 14–18)
IMMATURE GRANS (ABS): 0.43 THOU/MM3 (ref 0–0.07)
IMMATURE GRANULOCYTES: 4.4 %
LYMPHOCYTES # BLD: 11 %
LYMPHOCYTES ABSOLUTE: 1.1 THOU/MM3 (ref 1–4.8)
MCH RBC QN AUTO: 27.8 PG (ref 26–33)
MCHC RBC AUTO-ENTMCNC: 29.5 GM/DL (ref 32.2–35.5)
MCV RBC AUTO: 94.5 FL (ref 80–94)
MONOCYTES # BLD: 6.6 %
MONOCYTES ABSOLUTE: 0.6 THOU/MM3 (ref 0.4–1.3)
NUCLEATED RED BLOOD CELLS: 0 /100 WBC
PLATELET # BLD: 478 THOU/MM3 (ref 130–400)
PMV BLD AUTO: 9.8 FL (ref 9.4–12.4)
POTASSIUM SERPL-SCNC: 4.5 MEQ/L (ref 3.5–5.2)
RBC # BLD: 2.73 MILL/MM3 (ref 4.7–6.1)
SEG NEUTROPHILS: 70.3 %
SEGMENTED NEUTROPHILS ABSOLUTE COUNT: 6.9 THOU/MM3 (ref 1.8–7.7)
SODIUM BLD-SCNC: 145 MEQ/L (ref 135–145)
TOTAL PROTEIN: 6.2 G/DL (ref 6.1–8)
WBC # BLD: 9.8 THOU/MM3 (ref 4.8–10.8)

## 2020-10-16 PROCEDURE — 2100000000 HC CCU R&B

## 2020-10-16 PROCEDURE — 36415 COLL VENOUS BLD VENIPUNCTURE: CPT

## 2020-10-16 PROCEDURE — 85025 COMPLETE CBC W/AUTO DIFF WBC: CPT

## 2020-10-16 PROCEDURE — 2500000003 HC RX 250 WO HCPCS: Performed by: INTERNAL MEDICINE

## 2020-10-16 PROCEDURE — 92610 EVALUATE SWALLOWING FUNCTION: CPT

## 2020-10-16 PROCEDURE — 6360000002 HC RX W HCPCS: Performed by: INTERNAL MEDICINE

## 2020-10-16 PROCEDURE — 94761 N-INVAS EAR/PLS OXIMETRY MLT: CPT

## 2020-10-16 PROCEDURE — 2580000003 HC RX 258: Performed by: INTERNAL MEDICINE

## 2020-10-16 PROCEDURE — 94640 AIRWAY INHALATION TREATMENT: CPT

## 2020-10-16 PROCEDURE — 94660 CPAP INITIATION&MGMT: CPT

## 2020-10-16 PROCEDURE — C9113 INJ PANTOPRAZOLE SODIUM, VIA: HCPCS | Performed by: INTERNAL MEDICINE

## 2020-10-16 PROCEDURE — 82948 REAGENT STRIP/BLOOD GLUCOSE: CPT

## 2020-10-16 PROCEDURE — 71045 X-RAY EXAM CHEST 1 VIEW: CPT

## 2020-10-16 PROCEDURE — 80053 COMPREHEN METABOLIC PANEL: CPT

## 2020-10-16 PROCEDURE — 99232 SBSQ HOSP IP/OBS MODERATE 35: CPT | Performed by: INTERNAL MEDICINE

## 2020-10-16 PROCEDURE — 99233 SBSQ HOSP IP/OBS HIGH 50: CPT | Performed by: INTERNAL MEDICINE

## 2020-10-16 PROCEDURE — 2580000003 HC RX 258: Performed by: NURSE PRACTITIONER

## 2020-10-16 RX ORDER — LINEZOLID 600 MG/1
600 TABLET, FILM COATED ORAL EVERY 12 HOURS SCHEDULED
Status: DISCONTINUED | OUTPATIENT
Start: 2020-10-16 | End: 2020-10-17

## 2020-10-16 RX ORDER — MODAFINIL 100 MG/1
200 TABLET ORAL DAILY
Status: DISCONTINUED | OUTPATIENT
Start: 2020-10-16 | End: 2020-10-17

## 2020-10-16 RX ADMIN — POTASSIUM CHLORIDE: 2 INJECTION, SOLUTION, CONCENTRATE INTRAVENOUS at 15:06

## 2020-10-16 RX ADMIN — PANTOPRAZOLE SODIUM 40 MG: 40 INJECTION, POWDER, FOR SOLUTION INTRAVENOUS at 09:34

## 2020-10-16 RX ADMIN — ENOXAPARIN SODIUM 40 MG: 40 INJECTION SUBCUTANEOUS at 19:59

## 2020-10-16 RX ADMIN — GLYCOPYRROLATE AND FORMOTEROL FUMARATE 2 PUFF: 9; 4.8 AEROSOL, METERED RESPIRATORY (INHALATION) at 08:49

## 2020-10-16 RX ADMIN — GLYCOPYRROLATE AND FORMOTEROL FUMARATE 2 PUFF: 9; 4.8 AEROSOL, METERED RESPIRATORY (INHALATION) at 20:50

## 2020-10-16 RX ADMIN — DOXYCYCLINE 100 MG: 100 INJECTION, POWDER, LYOPHILIZED, FOR SOLUTION INTRAVENOUS at 09:33

## 2020-10-16 RX ADMIN — SODIUM CHLORIDE, PRESERVATIVE FREE 10 ML: 5 INJECTION INTRAVENOUS at 20:00

## 2020-10-16 RX ADMIN — SODIUM CHLORIDE, PRESERVATIVE FREE 10 ML: 5 INJECTION INTRAVENOUS at 09:33

## 2020-10-16 RX ADMIN — ENOXAPARIN SODIUM 40 MG: 40 INJECTION SUBCUTANEOUS at 09:30

## 2020-10-16 RX ADMIN — POTASSIUM CHLORIDE: 2 INJECTION, SOLUTION, CONCENTRATE INTRAVENOUS at 10:06

## 2020-10-16 ASSESSMENT — PAIN SCALES - GENERAL
PAINLEVEL_OUTOF10: 0
PAINLEVEL_OUTOF10: 0

## 2020-10-16 NOTE — PLAN OF CARE
Problem: Impaired respiratory status  Goal: Patient will achieve/maintain normal respiratory rate/effort  Outcome: Ongoing  Note: Patient remains on Bipap at this time. Will continue to monitor and assess.

## 2020-10-16 NOTE — CARE COORDINATION
10/16/20, 12:02 PM EDT    DISCHARGE ON GOING EVALUATION    [place] day: 23  Location: -06/006-A Reason for admit: Acute respiratory failure with hypoxemia ([address] [J96.01]  Acute respiratory failure with hypoxia ([address] [J96.01]   Procedure: 9/23 Intubated  9/24 CVC left subclavian - 9/30 removed  0/43 PICC right basilic  53/1 Extubated to bipap  10/6 Reintubated  10/15 extubated  10/16 CXR   Impression:          Minimal residual atelectasis and/or infiltrate within the bilateral mid   and lower lungs with improvement. Improved pulmonary inflation. Treatment Plan of Care: Tmax 99, BiPAP,O2 at 2 liters with saturations at 100%. Bicarb drip, Eraxis drip, electrolyte replacement protocols, IV Doxycycline, diabetes management, IV Protonix, Vitamin D. PT/OT. Barriers to Discharge: medical stability  PCP: Yoko Reddy MD  Readmission Risk Score: 53%  Patient Goals/Plan/Treatment Preferences: Jimenez is home with Chaunce[facility] vs TCU.

## 2020-10-16 NOTE — ADT AUTH CERT
Utilization Reviews         Pneumonia - Care Day 21 (10/13/2020) by Jerome Jones RN         Review Status  Review Entered    Completed  10/16/2020 15:49        Criteria Review       Care Day: 21 Care Date: 10/13/2020 Level of Care: ICU    Guideline Day 3    Level Of Care    ( ) Floor to discharge [F]    10/16/2020 3:49 PM EDT by Gonzalez Almaguer remains in icu 10-13    Clinical Status    (X) * Hemodynamic stability    10/16/2020 3:49 PM EDT by Taurus Mneendez      89/55 to 144/96  90  14- 24  98.0  30% fio2 per vent    (X) * Afebrile or temperature acceptable for next level of care    10/16/2020 3:49 PM EDT by Taurus Menendez      afeb    (X) * Tachypnea absent    10/16/2020 3:49 PM EDT by Taurus Menendez      yes    ( ) * Hypoxemia absent    ( ) * Mental status at baseline    ( ) * Antibiotic regimen acceptable for next level of care    ( ) * Discharge plans and education understood    Activity    ( ) * Ambulatory or acceptable for next level of care    Routes    ( ) * Oral hydration, medications, and diet    10/16/2020 3:49 PM EDT by Taurus Menendez      iv eraxis 100 mg qd/ iv doxy 10 mg q 12/ iv protonix 40 mg qd/ iv zosyn 3.375 q 8/ propofol gtt/ na bicarb gtt at 75 perhour/ iv kcl  for total of 120 meq this day/ tube feeds per og    Interventions    ( ) * Oxygen absent or at baseline need    ( ) * Isolation not indicated, or is performable at next level of care    10/16/2020 3:49 PM EDT by Taurus Menendez      covid isolatio ,vre and mrsa isolation    * Milestone    Additional Notes    Update for 10-13    NEPH NOTE Patient seen and examined by me. No distress    Intubated cannot accurately assess history or review of systems    Patient FiO2 is still at 30%    Urine output is better    1. Renal -acute kidney injury most likely due to septic ATN/hypotension    ? Improving with some IV hydration . ? Will continue increase to 75 ml/hr and monitor renal fx         2. Electrolytes -hypokalemia- being replaced. Will ad some kcl to the IVF as well. 3. Metabolic acidosis with some respiratory alkalosis as well. continue IV bicarbonate    4. Anemia- S/P post rectal clot    5. Hypotension mostly due to sepsis/volume depletion. Off pressors . overall doing stable    6. Hx of diabetes mellitus    7. Hx of COVID-19 pneumonia    8. Ventilator dependent respiratory failure - fluctuating FiO2    9. Hx of diastolic dysfunction volume status reasonable closely follow    10/13/2020 03:49    Sodium: 142    Potassium: 3.1 (L)    Chloride: 113 (H)    CO2: 16 (L)    BUN: 38 (H)    Creatinine: 2.7 (H)    Anion Gap: 13.0    Est, Glom Filt Rate: 30 (A)    Lactic Acid: 0.5    Glucose: 161 (H)    Calcium: 9.8    Total Protein: 6.0 (L)    Procalcitonin: 1.03 (H)    Albumin: 2.5 (L)    Alk Phos: 95    ALT: 30    AST: 27    Bilirubin: 0.3    WBC: 12.2 (H)    RBC: 2.60 (L)    Hemoglobin Quant: 7.5 (L)    Hematocrit: 24.6 (L)    MCV: 94.6 (H)    MCH: 28.8    MCHC: 30.5 (L)    MPV: 10.8    RDW-CV: 17.9 (H)    RDW-SD: 61.5 (H)    Platelet Count: 554    SW NOTE [facility] day: 20    Location: [facility]06/006- Reason for admit: Acute respiratory failure with hypoxemia ([facility] [address] [J96.01]    Acute respiratory failure with hypoxia ([facility] [address] [J96.01]    Procedure: 9/23 CXR: Bilateral pulmonary opacification worse than on previous study dated 10 September 2020. This may represent worsening inflammatory process, possibly Covid 19 infection; borderline cardiomegaly    9/23 Intubated    9/24 CVC left subclavian - 9/30 removed    9/81 PICC right basilic    54/6 Extubated to bipap    10/6 Reintubated    10/6 Renal US: Negative    10/7 CT Abd/pelvis: Bilateral lower lobe pneumonia. Known Covid; Distended colon with fluid, air and stool    10/8 CXR: No acute findings    10/11  CXR -  Similar bilateral ill-defined pneumonia.     10/12 CXR-Progressive bilateral consolidations or ARDS.     Treatment Plan of Care: remains on Vent 30%FiO2, O2 at 0.5 liter, PEEP 6 cmH2O, Tmax 98.8, Levophed drip, Propofol drip, Bicarb drip, IV Eraxis, IV Doxycycline, diabetes management, IV Protonix, electrolyte replacement protocols, IV Zosyn. GI conference consult, offered supportive care , anticoags on hold for rectal bleeding. Hgb 7.5 today. TF via OG at goal. Flexiseal, mason catheter. Barriers to Discharge: medical stability. PCP: Ольга Rockwell MD    Readmission Risk Score: 53%    Patient Goals/Plan/Treatment Preferences: from home alone. Plan is possible TCU vs LTACH. SW following. PT/OT when appropriate. PULM 1. Acute hypoxemic respiratory failure: Patient extubated 10/2/2020.  On low-flow oxygen.  Although patient had ongoing radiographic improvement, he had progressive lethargy and subsequent obtundation.  Patient intubated 10/6/2020 for hypercarbic hypoxemic respiratory failure.  Continue with lung protection strategies targeting a peak pressure 35 or less and a plateau pressure of 30 or less.  Patient at risk for requiring tracheostomy tube. 2. Acute lung injury: Oxygenating well.  Ventilatory capacity remains weak. 3. Acute renal failure: Patient has no IV contrast exposure.  Acute deterioration 10/6/2020.  Renal ultrasound was unimpressive.  Fractional excretion of sodium was less than 1 and was consistent with volume contraction.  Patient had hemodilution with volume resuscitation with a drop in hemoglobin of 2 g.  This also goes along with volume contraction.  Pressor requirements have significantly diminished as patient volume resuscitated.  Appreciate nephrology's assistance.  Secondary to hypotension and acute tubular necrosis. 4. Sepsis: Secondary to pneumonia and COVID-19.  Associated with tachypnea and pneumonia, initially. Pneumonia was secondary to MRSA and was treated with vancomycin.  As fever developed on 10/5/2020, vancomycin was discontinued and doxycycline was initiated.  Antibiotics also expanded to include Zosyn.  Both doxycycline and Zosyn were initiated on 10/6/2020.  Urinalysis showed greater than 200 white blood cells.  I suspect new onset fever is related to urinary tract infection. Sputum PCR shows persistent MRSA, which may represent colonization. Blood cultures are negative.  Patient has completed his course of Voncille Karan he still remains on doxycycline.  Repeat sputum culture and PCR collected 10/12/2020 remain negative. .    5. Pneumonia: Secondary to MRSA.  Status post 8 days of vancomycin.  Patient developed new onset fever while on vancomycin.  Vancomycin was subsequently discontinued and doxycycline was initiated.  Radiographically, there is not much change.  As fever developed while on vancomycin, vancomycin was discontinued.  PCR of pulmonary lavage still showed MRSA within the sputum.  This may represent contamination.  However, patient will continue with doxycycline for a total of 10 days.  Day #8/10 doxycycline. 6. Anemia: Was dilutional..    7. Type 2 diabetes mellitus: Subcutaneous insulin. 8. Diastolic heart failure: On calcium channel blocker and diuretic.  Amlodipine on hold secondary to hypotension. 9. Obstructive sleep apnea: Secondary morbid obesity.  CPAP/BiPAP noncompliant. 10. Hyperlipidemia: On atorvastatin. 11. Hypertension:  Currently hypotensive.  Amlodipine discontinued. 12. Gout: On allopurinol. 13. COVID-19: Convalescent plasma received 9/24/2020.  Resolved. 14. Hypotension: Status post pressors and volume resuscitation. Screen for adrenal insufficiency was negative.  Continues to require pressors.  Etiology is secondary to volume contraction and sepsis.  Multifactorial.  Continue with volume resuscitation.  As patient undergoes volume resuscitation, pressor requirements are diminishing.  Suspect this is primarily volume contraction and not septic shock.  Resolved. .         CC: Respiratory failure    HPI: Patient is a 23-year-old morbidly obese black male lifetime non-smoker. Gabo Line has a history of morbid obesity associated with type 2 diabetes mellitus, prior alcohol abuse, hyperlipidemia, hypertension, hypogonadism, nonalcoholic steatohepatitis, obstructive sleep apnea, and gout.  Patient was hospitalized 9/6/2020 through 9/15/2020 with hypoxemic respiratory failure secondary to COVID-19 associated with diffuse bilateral infiltrates.  At that time, patient received Decadron, remdesivir, and danazol.  During hospitalization, he had issues with atrial fibrillation.  His insulin therapy required adjustment.  He was discharged home on subcutaneous Lovenox. Patient returned back to the emergency room on 9/23/2020 with increasing SOB and progressive hypoxia. CXR showed diffuse infiltrates.  Deteriorated and required intubation. Patient underwent bronchoscopy on 9/27/2020 which demonstrated no mucus production. Patient extubated 10/2/2020. Patient reintubated for progressive respiratory failure with progressive obtundation on 10/6/2020.  This was associated with acute oliguric renal failure.  Patient was intubated 10/6/2020, and underwent volume resuscitation.  Fractional excretion of sodium returned less than 1 which was consistent with prerenal azotemia.  After volume resuscitation, patient demonstrated that he was hemoconcentrated with a drop of 2 g in the hemoglobin. With volume resuscitation, urine output did improve. After patient was intubated on 10/6/2020, he underwent pulmonary lavage which showed MRSA on the PCR but no other organism.  However, patient was found to have greater than 200 white blood cells in the urine.  Patient was treated with Zosyn and doxycycline. Previously, patient had received vancomycin and developed fever while on vancomycin. Therefore vancomycin was not continued. · Telemetry shows sinus rhythm. · Echocardiogram shows an ejection fraction of 60%. .    · Sputum collected 9/23/2020: Positive for MRSA. · Sputum PCR collected 10/6/2020 is positive for MRSA. · Renal ultrasound report 10/6/2020 shows normal kidneys. · Urine positive for Candida glabrata. · Chest x-ray shows diffuse bilateral infiltrates. · Sputum culture and PCR collected 10/12/2020: Negative. · Sodium 142, potassium 3.1, chloride 116, bicarb 16, BUN 38, creatinine 2.7, glucose 157.  Procalcitonin 1.03.  White blood cell count 12.2, hemoglobin 7.5, platelets 974. .

## 2020-10-16 NOTE — PROGRESS NOTES
Comprehensive Nutrition Assessment    Type and Reason for Visit:  Reassess(follow-up)    Nutrition Recommendations/Plan:   Await SLP evaluation and start po diet if able. If needs to continue NPO recommend resuming EN - Vital 1.2 20ml/hour with 1 2.5ounce liquid protein bottle 4x daily. Nutrition Assessment:    Pt. with no improvement in nutritional status as is NPO post extubation but too lethargic to safely take po and needs SLP evaluation to determine swallowing safety.  Remains at risk for further nutritional compromise r/t admitted with acute respiratory failure with hypoxemia, +covid, morbidly obese, altered GI function (recent large clot from rectal tube site), elevated renal labs, elevated Hgb A1C of 9.9, and underlying medical condition (hx: CAD, DM, GERD, Alcohol abuse, HLD, HTN, Vitamin D deficiency).  Nutrition recommendations/interventions as per above. Malnutrition Assessment:  Malnutrition Status:  Insufficient data(+covid)    Context:  Acute Illness     Findings of the 6 clinical characteristics of malnutrition:  Energy Intake:  Mild decrease in energy intake (Comment)(good appetite or on EN at goal since admit)  Weight Loss:  (16# or 5% in 2 months)     Body Fat Loss:  Unable to assess(+covid patient)     Muscle Mass Loss:  Unable to assess(+covid)    Fluid Accumulation:  1 - Mild Extremities   Strength:  Not Performed    Estimated Daily Nutrient Needs:  Energy (kcal):  3598-7355 (30-32 kcals/kg IBW in active late phase);  Weight Used for Energy Requirements:  Ideal(70 kg - ideal weight)     Protein (g):  ~140 gms (2/kgm IBW) as renal status allows; Weight Used for Protein Requirements:  Ideal(70 kg)        Fluid (ml/day):  per MD; Weight Used for Fluid Requirements:  (n/a)      Nutrition Related Findings:  covid; extubated 10/15; OGT removed; spoke with RN - was on bipap this am; needs SLP evaluation prior to po attempt as RN states pt. is awake but no strength to move and ? swallowing safety; meds include psyllium, IV doxycycline, folic acid, lantus, humalog, culturelle, MVI; glucose 122  BUN 23  creatinine 2.1  MAP 86; BM x2 past 24h - liquid stools much improved; flexiseal was removed on 10/12 d/t large blood clot at site of tube - GI consult and patient had overinflated balloon likely resulting in an ulcer. Wounds:  Stage III(perineum; skin tear scrotum)       Current Nutrition Therapies:    NPO    Anthropometric Measures:  · Height: 5' 8\" (172.7 cm)  · Current Body Weight: 319 lb (144.7 kg)(10/16 with +2 edema)   · Admission Body Weight: 285 lb (129.3 kg)(9/23/20, stated, +1 BLE and BUE edema)    · Usual Body Weight: 306 lb (138.8 kg)(EMR, 6/29/20, actual.  299# 8/15/20, bedscale.)     · Ideal Body Weight: 154 lbs;   · BMI: 48.5  · BMI Categories: Obese Class 3 (BMI 40.0 or greater)       Nutrition Diagnosis:   · Inadequate oral intake related to (possible swallowing difficulty) as evidenced by NPO or clear liquid status due to medical condition(lethargy; await SLP evaluation)      Nutrition Interventions:   Food and/or Nutrient Delivery:  (Start oral diet as able, if unable to start diet recommend restart EN)  Nutrition Education/Counseling:  No recommendation at this time   Coordination of Nutrition Care:  Continued Inpatient Monitoring, Interdisciplinary Rounds    Goals:  Patient will receive adequate nutrition in 1-4 days       Nutrition Monitoring and Evaluation:   Behavioral-Environmental Outcomes:  (n/a)   Food/Nutrient Intake Outcomes:  (oral diet vs EN)  Physical Signs/Symptoms Outcomes:  Biochemical Data, Chewing or Swallowing, GI Status, Fluid Status or Edema, Hemodynamic Status, Skin, Weight     Discharge Planning:     Too soon to determine     Electronically signed by Eliseo Concepcion RD, LD on 10/16/20 at 2:16 PM EDT    Contact: [phone]

## 2020-10-16 NOTE — PROGRESS NOTES
level tomorrow  8 Hx of COVID-19 pneumonia status post extubation  9 Hx of diastolic dysfunction volume status reasonable closely follow  10 Meds reviewed and discussed with the nursing staff     EMELY Castelan D.  [facility].

## 2020-10-16 NOTE — PLAN OF CARE
Problem: Falls - Risk of:  Goal: Will remain free from falls  Description: Will remain free from falls  Outcome: Ongoing  Note: Call light in reach, bed in lowest position, and bed alarm activated. Education given on use of call light before ambulation and when in need of assistance. Patient expressed understanding. Hourly visual checks performed and charted. Toileting offered to patient. No falls this shift, at any time. Arm band and falling star in place. Will continue to monitor. Problem: Falls - Risk of:  Goal: Absence of physical injury  Outcome: Ongoing  Note: Patient remains free of injury this shift. Call light within reach and hourly rounds performed. Problem: Skin Integrity:  Goal: Will show no infection signs and symptoms  Description: Will show no infection signs and symptoms  Outcome: Ongoing  Note: No signs of new skin breakdown with each assessment. Skin remains warm, dry, intact. Mucous membranes pink & moist.Turning and repositioning patient every 2 hours. Problem: Discharge Planning:  Goal: Discharged to appropriate level of care  Description: Discharged to appropriate level of care  Outcome: Ongoing  Note: Discharge planning in process and discussed with patient/family. Social work consulted for any additional needs. Care manager aware of discharge needs. Problem: Urinary Retention:  Goal: Urinary elimination within specified parameters  Description: Urinary elimination within specified parameters  Outcome: Ongoing  Note: Dimas catheter remains in place. Dimas care provided. Problem: Nutrition  Goal: Optimal nutrition therapy  10/16/2020 1748 by Tahir Brink RN  Outcome: Ongoing  Note: Pt NPO at this time. Speech therapy to reevaluate diet tomorrow. Problem: Impaired respiratory status  Goal: Patient will achieve/maintain normal respiratory rate/effort  10/16/2020 1748 by Tahir Brink RN  Outcome: Ongoing  Note: Pt remains on 1L NC at this time.

## 2020-10-16 NOTE — PLAN OF CARE
Patient continues on bipap and inhaler; tolerating well.   Will continue to monitor patients respiratory status

## 2020-10-16 NOTE — PROGRESS NOTES
[address] [facility] CVICU 4B  Bedside Swallowing Evaluation      SLP Individual Minutes  Time In: 3492  Time Out: 1909  Minutes: 10  Timed Code Treatment Minutes: 0 Minutes       Date: 10/16/2020  Patient Name: Emeka Strange      CSN: [id number]   : 1970  (44 y.o.)  Gender: male   Referring Physician:  Alex Thibodeaux MD   Diagnosis: Acute respiratory failure with hypoxemia  Secondary Diagnosis: Dysphagia    History of Present Illness/Injury: Pt admitted to [facility] with above med dx; please refer to physician H&P for full details. Pt with complicated medical course. Hospitalization required 2020-9/15/2020 with hypoxemic respiratory failure s/t COVID-19 associated with diffuse bilateral infiltrates. Return to the ED on  with deterioration and intubation required; extubation completed 10/2/2020 with reintubation required 10/6/2020; extubation on 10/15/2020. ST consulted to assess swallow function post extubation to determine readiness for potential PO diet level initiation and needs for further dysphagia management.    Past Medical History:   Diagnosis Date    Arthritis     RHEUMATOID    Atrial fibrillation (HCC)     BPH (benign prostatic hyperplasia)     CAD (coronary artery disease)     Carpal tunnel syndrome on right     rt hand    Chronic gout     DM2 (diabetes mellitus, type 2) (HCC)     GERD (gastroesophageal reflux disease)     Heart failure with preserved ejection fraction (HCC)     History of alcohol abuse     History of osteomyelitis     Hx of R foot wound, osteomyelitis 2018 requiring surgery and IV Vanco    Hyperlipidemia     Hypertension, essential     Hypogonadism, male     Major depression     Mood disorder ([address]     Morbid obesity ([address]     Muscle weakness     DURAN (nonalcoholic steatohepatitis)     Obstructive sleep apnea treated with continuous positive airway pressure (CPAP)     KIARA (obstructive sleep apnea)     Vitamin D deficiency        SUBJECTIVE:  Pt resting in bed upon arrival, lethargy notable. Limited participation within current setting with \"grunting\" verbalizations noted. OBJECTIVE:    Pain:  No pain reported. Current Diet: NPO    Respiratory Status:  Nasal Canula    Behavioral Observation:  Alert, Confused, Lethargic and Limited Direction Following    Oral Mechanism Evaluation:      Facial / Labial Impaired Decreased tone   Lingual Impaired Reduced ROM and coordination    Dentition Not able to ascertain dentition given limited mandible opening    Velum Not able to visualize    Vocal Quality No vocalizations produced    Sensation Not Tested    Cough Not Tested      Patient Evaluated Using:  Ice chips x3    Oral Phase:  Impaired:  Impaired AP Movement, Impaired Mastication and Impaired Oral Initiation    Pharyngeal Phase: Impaired:  Questionable achievement for pharyngeal swallow trigger; not able to exclude pharyngeal phase deficits at bedside alone    Signs and Symptoms of Laryngeal Penetration/Aspiration: Throat Clear and Wet Vocal Quality    Impresssions: Pt presents with suspected moderate oral dysphagia, not able to exclude pharyngeal phase dysfunction at bedside alone; multiple dysphagia risk factors noted, including compromised respiratory status and potential disuse atrophy of the swallowing mechanism given prolonged NPO diet level. Impaired oral initiation skills with limited labial opening with ST required to place all presented boli on anterior lingual surface to permit entrance. Suspected decreased manipulation and control of bolus with intermittent oral bolus holding noted. Ice chips x3 consumed with consistent presence of a throat clear + wet vocal quality, certainly concerning for potential airway invasion events. NOT able to fully discern if pharyngeal swallow trigger was achieved d/t excess adipose tissue at level of the swallowing mechanism/musculature region.  Recommend continuation of NPO diet level with STRONG recommendations to complete comprehensive, aggressive oral care measures WITH subsequent administration of ice chips throughout the day to assist with preservation of the swallowing mechanism. *consultation completed with attending provider, Yobany Hatch MD re: recommendations for administration of ice chips post oral care with verbal receptiveness and agreement noted    RECOMMENDATIONS/ASSESSMENT:   Modified Barium Swallow:  MBS is not indicated at this time. Will recommend as appropriate  Diet Recommendations:  NPO  *ice chips post completion of comprehensive, aggressive oral care  Strategies:  Recommend NPO   Rehabilitation Potential: Guarded    EDUCATION:  Learner: Patient  Education:  Reviewed diet and strategies, Reviewed signs, symptoms and risks of aspiration, Reviewed ST goals and Plan of Care and Reviewed recommendations for follow-up  Evaluation of Education: Needs further instruction, No evidence of learning and Family not present    PLAN:  Skilled SLP intervention on acute care 3-5 x per week or until goals met and/or pt plateaus in function. Specific interventions for next session may include: PO trials, oral care. PATIENT GOAL:    Did not state. Will further assess during treatment. SHORT TERM GOALS:  Short-term Goals  Timeframe for Short-term Goals: 2 weeks  Goal 1: Pt will safely consume PO trials of thin liquids and purees  (and advanced texture trials as deemed clinically indicated) demonstrating consistent pharyngeal swallow trigger and endurance to determine readiness for potential PO diet level initiation. Goal 2: Staff will exhibit return demonstration for implementation of comprehensive oral care procedural analysis and administration of ice chips post oral care to maximize oral integrity and preserve swallow mechanism.   Goal 3: Monitor pulmonary status and readiness for potential formal instrumentation; complete MBS/FEES should it become clinically indicated.     LONG TERM GOALS:  No LTG established given short ELOS      Raoul Wise M.A., [address] [facility]

## 2020-10-16 NOTE — PROGRESS NOTES
Patient:  Holger Aguilar                    Unit/Bed:4B-04/004-A  MACARENA: [id number]            Caroline Garvey MD  Date of Admission: 9/23/2020     Assessment and Plan(All pulmonary edema, renal failure, PE, and respiratory failure diagnoses are acute in nature unless otherwise specified):        1. Acute hypoxemic respiratory failure: Patient extubated 10/2/2020.  On low-flow oxygen.  Although patient had ongoing radiographic improvement, he had progressive lethargy and subsequent obtundation.  Patient intubated 10/6/2020 for hypercarbic hypoxemic respiratory failure.  Patient extubated 10/15/2020.  2. Acute lung injury: Oxygenating well. 3. Acute renal failure: Patient has no IV contrast exposure.  Acute deterioration 10/6/2020.  Renal ultrasound was unimpressive.  Fractional excretion of sodium was less than 1 and was consistent with volume contraction.  Patient had hemodilution with volume resuscitation with a drop in hemoglobin of 2 g.  This also goes along with volume contraction.  Pressor requirements have significantly diminished as patient volume resuscitated. Appreciate nephrology's assistance.  Secondary to hypotension and acute tubular necrosis. 4. Sepsis: Secondary to pneumonia and COVID-19.  Associated with tachypnea and pneumonia, initially. Pneumonia was secondary to MRSA and was treated with vancomycin.  As fever developed on 10/5/2020, vancomycin was discontinued and doxycycline was initiated.  Antibiotics also expanded to include Zosyn.  Both doxycycline and Zosyn were initiated on 10/6/2020.  Urinalysis showed greater than 200 white blood cells.  I suspect new onset fever is related to urinary tract infection. Sputum PCR shows persistent MRSA, which may represent colonization.  Blood cultures are negative.  Patient has completed his course of [facility] he still remains on doxycycline.  Repeat sputum culture positive for staph aureus on 10/12/2020.  Sensitivities returned resistant to doxycycline. Doxycycline discontinued on 10/16/2020. Linezolid initiated 10/16/2020.  5. Pneumonia: Secondary to MRSA.  Status post 8 days of vancomycin.  Patient developed new onset fever while on vancomycin.  Vancomycin was subsequently discontinued and doxycycline was initiated.  Radiographically, there is not much change.  As fever developed while on vancomycin, vancomycin was discontinued.  PCR of pulmonary lavage still showed MRSA within the sputum.  Now resistant to doxycycline. Transition to linezolid. Day 1/8 linezolid. 6. Anemia: Was dilutional..  7. Type 2 diabetes mellitus: Subcutaneous insulin. 8. Diastolic heart failure: On diuretic  9. Obstructive sleep apnea: Secondary morbid obesity.  CPAP/BiPAP noncompliant. Treatment with progesterone at night, and Provigil during the day. 10. Hyperlipidemia: On atorvastatin. 11. Hypertension:   Amlodipine discontinued. 12. Gout: On allopurinol. 13. COVID-19: Convalescent plasma received 9/24/2020.  Resolved. 14. Hypotension: Status post pressors and volume resuscitation. Screen for adrenal insufficiency was negative.  Continues to require pressors.  Etiology is secondary to volume contraction and sepsis.  Multifactorial.  Continue with volume resuscitation.  As patient undergoes volume resuscitation, pressor requirements are diminishing.  Suspect this is primarily volume contraction and not septic shock.  Resolved. .     CC: Respiratory failure  HPI: Patient is a 38-year-old morbidly obese black male lifetime non-smoker. Kingsley Cortez has a history of morbid obesity associated with type 2 diabetes mellitus, prior alcohol abuse, hyperlipidemia, hypertension, hypogonadism, nonalcoholic steatohepatitis, obstructive sleep apnea, and gout.  Patient was hospitalized 9/6/2020 through 9/15/2020 with hypoxemic respiratory failure secondary to COVID-19 associated with diffuse bilateral infiltrates.  At that time, patient received Decadron, remdesivir, and danazol.  During hospitalization, he had issues with atrial fibrillation.  His insulin therapy required adjustment.  He was discharged home on subcutaneous Lovenox. Patient returned back to the emergency room on 9/23/2020 with increasing SOB and progressive hypoxia. CXR showed diffuse infiltrates.  Deteriorated and required intubation. Patient underwent bronchoscopy on 9/27/2020 which demonstrated no mucus production. Patient extubated 10/2/2020. Patient reintubated for progressive respiratory failure with progressive obtundation on 10/6/2020.  This was associated with acute oliguric renal failure.  Patient was intubated 10/6/2020, and underwent volume resuscitation.  Fractional excretion of sodium returned less than 1 which was consistent with prerenal azotemia.  After volume resuscitation, patient demonstrated that he was hemoconcentrated with a drop of 2 g in the hemoglobin. With volume resuscitation, urine output did improve. After patient was intubated on 10/6/2020, he underwent pulmonary lavage which showed MRSA on the PCR but no other organism.  However, patient was found to have greater than 200 white blood cells in the urine.  Patient was treated with Zosyn and doxycycline. Previously, patient had received vancomycin and developed fever while on vancomycin. Therefore vancomycin was not continued.  Sputum subsequently grew staph aureus.  Zosyn was discontinued but doxycycline continued on 10/14/2020. Patient did well with spontaneous breathing trial and was extubated 10/15/2020. For further details, please review the assessment and plan. ROS:  Patient lethargic but arousable. No chest pain. No wheezing. No chest tightness. .  PMH:  Per HPI  SHX: Lifetime non-smoker  FHX: Positive for heart disease.   Allergies: PCN  Medications:     sodium bicarbonate infusion 75 mL/hr at 10/16/20 1006    dextrose        progesterone  200 mg Oral Nightly    modafinil  200 mg Oral Daily    multivitamin+  30 mL Oral Daily    potassium chloride  20 mEq Intravenous Daily    And    potassium chloride  20 mEq Intravenous Daily    psyllium  1 packet Oral Daily    pantoprazole  40 mg Intravenous Daily    anidulafungin  100 mg Intravenous Q24H    lactobacillus  1 capsule Oral Daily with breakfast    enoxaparin  40 mg Subcutaneous BID    insulin glargine  38 Units Subcutaneous Nightly    insulin lispro  0-12 Units Subcutaneous TID WC    insulin lispro  0-6 Units Subcutaneous Nightly    doxycycline (VIBRAMYCIN) IV  100 mg Intravenous Q12H    midodrine  5 mg Oral TID     lidocaine 1 % injection  5 mL Intradermal Once    sodium chloride flush  10 mL Intravenous 2 times per day    magnesium replacement protocol   Other RX Placeholder    phosphorus replacement protocol   Other RX Placeholder    calcium replacement protocol   Other RX Placeholder    allopurinol  200 mg Oral Daily    ARIPiprazole  15 mg Oral Daily    aspirin  81 mg Oral Daily    atorvastatin  40 mg Oral Nightly    Vitamin D  2,000 Units Oral Daily    folic acid  1 mg Oral Daily    gabapentin  800 mg Oral BID    glycopyrrolate-formoterol  2 puff Inhalation BID     Vital Signs:   T: 99: P: 93: RR: 13: B/P: 97/40: FiO2: 21: O2 Sat: 98: I/O: 2454/2075  GCS:  9:  Body mass index is 48.52 kg/m². [facility] General:   Morbidly obese black male. Chronically ill appearing. HEENT:  normocephalic and atraumatic.  No scleral icterus. PERR  Neck: supple.  No Thyromegaly. Lungs: Clear to anterior auscultation.  Respirations unlabored. Cardiac: RRR.  No JVD. Abdomen: soft.  Nontender.  Large panniculus. Extremities:  No clubbing, cyanosis x 4. Trace lower extremity edema bilaterally. Vasculature: capillary refill < 3 seconds. Palpable dorsalis pedis pulses. Skin:  warm and dry. Psych:    Sedated on mechanical ventilator. [facility] Lymph:  No supraclavicular adenopathy. Neurologic:  No focal deficit.  No seizures.     Data: (All radiographs, tracings, PFTs, and imaging are personally viewed and interpreted unless otherwise noted). · Telemetry shows sinus rhythm. · Echocardiogram shows an ejection fraction of 60%. .  · Sputum collected 9/23/2020: Positive for MRSA. · Sputum PCR collected 10/6/2020 is positive for MRSA. · Renal ultrasound report 10/6/2020 shows normal kidneys. · Urine positive for Candida glabrata. · Sputum culture collected 10/12/2020: Doxycycline resistant MRSA. · Sodium 145, potassium 4.5, chloride 118, bicarb 18, BUN 23, creatinine 2.1, glucose 122. White blood cell count 9.8, hemoglobin 7.6, platelets 744. · Chest x-ray shows hazy background infiltrates. .     Electronically signed by Monica Ely M.D.

## 2020-10-17 LAB
ALBUMIN SERPL-MCNC: 2.5 G/DL (ref 3.5–5.1)
ALP BLD-CCNC: 75 U/L (ref 38–126)
ALT SERPL-CCNC: 17 U/L (ref 11–66)
ANION GAP SERPL CALCULATED.3IONS-SCNC: 10 MEQ/L (ref 8–16)
AST SERPL-CCNC: 13 U/L (ref 5–40)
BASOPHILS # BLD: 0.4 %
BASOPHILS ABSOLUTE: 0 THOU/MM3 (ref 0–0.1)
BILIRUB SERPL-MCNC: 0.3 MG/DL (ref 0.3–1.2)
BUN BLDV-MCNC: 17 MG/DL (ref 7–22)
CALCIUM SERPL-MCNC: 9.2 MG/DL (ref 8.5–10.5)
CHLORIDE BLD-SCNC: 119 MEQ/L (ref 98–111)
CO2: 17 MEQ/L (ref 23–33)
CREAT SERPL-MCNC: 1.6 MG/DL (ref 0.4–1.2)
EOSINOPHIL # BLD: 5.9 %
EOSINOPHILS ABSOLUTE: 0.6 THOU/MM3 (ref 0–0.4)
ERYTHROCYTE [DISTWIDTH] IN BLOOD BY AUTOMATED COUNT: 17.7 % (ref 11.5–14.5)
ERYTHROCYTE [DISTWIDTH] IN BLOOD BY AUTOMATED COUNT: 58.8 FL (ref 35–45)
GFR SERPL CREATININE-BSD FRML MDRD: 55 ML/MIN/1.73M2
GLUCOSE BLD-MCNC: 109 MG/DL (ref 70–108)
GLUCOSE BLD-MCNC: 121 MG/DL (ref 70–108)
GLUCOSE BLD-MCNC: 122 MG/DL (ref 70–108)
GLUCOSE BLD-MCNC: 95 MG/DL (ref 70–108)
GLUCOSE BLD-MCNC: 99 MG/DL (ref 70–108)
HCT VFR BLD CALC: 23 % (ref 42–52)
HEMOGLOBIN: 7.1 GM/DL (ref 14–18)
IMMATURE GRANS (ABS): 0.35 THOU/MM3 (ref 0–0.07)
IMMATURE GRANULOCYTES: 3.5 %
LYMPHOCYTES # BLD: 11.6 %
LYMPHOCYTES ABSOLUTE: 1.1 THOU/MM3 (ref 1–4.8)
MCH RBC QN AUTO: 28.7 PG (ref 26–33)
MCHC RBC AUTO-ENTMCNC: 30.9 GM/DL (ref 32.2–35.5)
MCV RBC AUTO: 93.1 FL (ref 80–94)
MONOCYTES # BLD: 7.8 %
MONOCYTES ABSOLUTE: 0.8 THOU/MM3 (ref 0.4–1.3)
NUCLEATED RED BLOOD CELLS: 0 /100 WBC
PLATELET # BLD: 512 THOU/MM3 (ref 130–400)
PMV BLD AUTO: 10.2 FL (ref 9.4–12.4)
POTASSIUM SERPL-SCNC: 2.6 MEQ/L (ref 3.5–5.2)
POTASSIUM SERPL-SCNC: 3 MEQ/L (ref 3.5–5.2)
POTASSIUM SERPL-SCNC: 3 MEQ/L (ref 3.5–5.2)
PTH INTACT: < 1.2 PG/ML (ref 15–65)
RBC # BLD: 2.47 MILL/MM3 (ref 4.7–6.1)
SEG NEUTROPHILS: 70.8 %
SEGMENTED NEUTROPHILS ABSOLUTE COUNT: 7 THOU/MM3 (ref 1.8–7.7)
SODIUM BLD-SCNC: 146 MEQ/L (ref 135–145)
TOTAL PROTEIN: 5.4 G/DL (ref 6.1–8)
VITAMIN D 25-HYDROXY: 26 NG/ML (ref 30–100)
WBC # BLD: 9.9 THOU/MM3 (ref 4.8–10.8)

## 2020-10-17 PROCEDURE — 2580000003 HC RX 258: Performed by: INTERNAL MEDICINE

## 2020-10-17 PROCEDURE — 2060000000 HC ICU INTERMEDIATE R&B

## 2020-10-17 PROCEDURE — 82948 REAGENT STRIP/BLOOD GLUCOSE: CPT

## 2020-10-17 PROCEDURE — 6360000002 HC RX W HCPCS: Performed by: NURSE PRACTITIONER

## 2020-10-17 PROCEDURE — 2580000003 HC RX 258: Performed by: NURSE PRACTITIONER

## 2020-10-17 PROCEDURE — 94660 CPAP INITIATION&MGMT: CPT

## 2020-10-17 PROCEDURE — 85025 COMPLETE CBC W/AUTO DIFF WBC: CPT

## 2020-10-17 PROCEDURE — 94761 N-INVAS EAR/PLS OXIMETRY MLT: CPT

## 2020-10-17 PROCEDURE — 94640 AIRWAY INHALATION TREATMENT: CPT

## 2020-10-17 PROCEDURE — 80053 COMPREHEN METABOLIC PANEL: CPT

## 2020-10-17 PROCEDURE — C9113 INJ PANTOPRAZOLE SODIUM, VIA: HCPCS | Performed by: INTERNAL MEDICINE

## 2020-10-17 PROCEDURE — 2700000000 HC OXYGEN THERAPY PER DAY

## 2020-10-17 PROCEDURE — 82306 VITAMIN D 25 HYDROXY: CPT

## 2020-10-17 PROCEDURE — 99233 SBSQ HOSP IP/OBS HIGH 50: CPT | Performed by: INTERNAL MEDICINE

## 2020-10-17 PROCEDURE — 6360000002 HC RX W HCPCS: Performed by: INTERNAL MEDICINE

## 2020-10-17 PROCEDURE — 6370000000 HC RX 637 (ALT 250 FOR IP): Performed by: INTERNAL MEDICINE

## 2020-10-17 PROCEDURE — 84132 ASSAY OF SERUM POTASSIUM: CPT

## 2020-10-17 PROCEDURE — 6370000000 HC RX 637 (ALT 250 FOR IP): Performed by: HOSPITALIST

## 2020-10-17 PROCEDURE — 36415 COLL VENOUS BLD VENIPUNCTURE: CPT

## 2020-10-17 PROCEDURE — 2500000003 HC RX 250 WO HCPCS: Performed by: INTERNAL MEDICINE

## 2020-10-17 PROCEDURE — 83970 ASSAY OF PARATHORMONE: CPT

## 2020-10-17 RX ORDER — GABAPENTIN 400 MG/1
400 CAPSULE ORAL 2 TIMES DAILY
Status: DISCONTINUED | OUTPATIENT
Start: 2020-10-17 | End: 2020-11-06 | Stop reason: HOSPADM

## 2020-10-17 RX ORDER — MODAFINIL 100 MG/1
100 TABLET ORAL DAILY
Status: DISCONTINUED | OUTPATIENT
Start: 2020-10-18 | End: 2020-11-06 | Stop reason: HOSPADM

## 2020-10-17 RX ORDER — SODIUM CHLORIDE 9 MG/ML
INJECTION, SOLUTION INTRAVENOUS CONTINUOUS
Status: DISCONTINUED | OUTPATIENT
Start: 2020-10-17 | End: 2020-10-18

## 2020-10-17 RX ORDER — POTASSIUM CHLORIDE 29.8 MG/ML
20 INJECTION INTRAVENOUS
Status: COMPLETED | OUTPATIENT
Start: 2020-10-17 | End: 2020-10-17

## 2020-10-17 RX ADMIN — GABAPENTIN 400 MG: 400 CAPSULE ORAL at 21:31

## 2020-10-17 RX ADMIN — ENOXAPARIN SODIUM 40 MG: 40 INJECTION SUBCUTANEOUS at 21:47

## 2020-10-17 RX ADMIN — POTASSIUM CHLORIDE 20 MEQ: 29.8 INJECTION, SOLUTION INTRAVENOUS at 06:15

## 2020-10-17 RX ADMIN — GLYCOPYRROLATE AND FORMOTEROL FUMARATE 2 PUFF: 9; 4.8 AEROSOL, METERED RESPIRATORY (INHALATION) at 09:28

## 2020-10-17 RX ADMIN — SODIUM CHLORIDE, PRESERVATIVE FREE 10 ML: 5 INJECTION INTRAVENOUS at 21:31

## 2020-10-17 RX ADMIN — ARIPIPRAZOLE 15 MG: 15 TABLET ORAL at 12:41

## 2020-10-17 RX ADMIN — POTASSIUM CHLORIDE 20 MEQ: 29.8 INJECTION, SOLUTION INTRAVENOUS at 09:57

## 2020-10-17 RX ADMIN — ENOXAPARIN SODIUM 40 MG: 40 INJECTION SUBCUTANEOUS at 09:56

## 2020-10-17 RX ADMIN — POTASSIUM CHLORIDE: 2 INJECTION, SOLUTION, CONCENTRATE INTRAVENOUS at 04:43

## 2020-10-17 RX ADMIN — SODIUM CHLORIDE, PRESERVATIVE FREE 10 ML: 5 INJECTION INTRAVENOUS at 12:41

## 2020-10-17 RX ADMIN — ASPIRIN 81 MG: 81 TABLET, CHEWABLE ORAL at 12:41

## 2020-10-17 RX ADMIN — GLYCOPYRROLATE AND FORMOTEROL FUMARATE 2 PUFF: 9; 4.8 AEROSOL, METERED RESPIRATORY (INHALATION) at 22:34

## 2020-10-17 RX ADMIN — PANTOPRAZOLE SODIUM 40 MG: 40 INJECTION, POWDER, FOR SOLUTION INTRAVENOUS at 12:41

## 2020-10-17 RX ADMIN — SODIUM CHLORIDE: 9 INJECTION, SOLUTION INTRAVENOUS at 12:56

## 2020-10-17 RX ADMIN — POTASSIUM CHLORIDE 20 MEQ: 29.8 INJECTION, SOLUTION INTRAVENOUS at 06:54

## 2020-10-17 ASSESSMENT — PAIN SCALES - GENERAL: PAINLEVEL_OUTOF10: 0

## 2020-10-17 ASSESSMENT — PAIN SCALES - WONG BAKER
WONGBAKER_NUMERICALRESPONSE: 0
WONGBAKER_NUMERICALRESPONSE: 0

## 2020-10-17 NOTE — PROGRESS NOTES
Critical care Note:      CC: Over 19 pneumonia with acute respiratory failure    HPI:   Patient is a 57-year-old morbidly obese black male lifetime non-smoker. Saturnino Francisco has a history of morbid obesity associated with type 2 diabetes mellitus, prior alcohol abuse, hyperlipidemia, hypertension, hypogonadism, nonalcoholic steatohepatitis, obstructive sleep apnea, and gout.  Patient was hospitalized 9/6/2020 through 9/15/2020 with hypoxemic respiratory failure secondary to COVID-19 associated with diffuse bilateral infiltrates.  At that time, patient received Decadron, remdesivir, and danazol.  During hospitalization, he had issues with atrial fibrillation.  His insulin therapy required adjustment.  He was discharged home on subcutaneous Lovenox. Patient returned back to the emergency room on 9/23/2020 with increasing SOB and progressive hypoxia. CXR showed diffuse infiltrates.  Deteriorated and required intubation. Patient underwent bronchoscopy on 9/27/2020 which demonstrated no mucus production. Patient extubated 10/2/2020. Patient reintubated for progressive respiratory failure with progressive obtundation on 10/6/2020.  This was associated with acute oliguric renal failure.  Patient was intubated 10/6/2020, and underwent volume resuscitation.  Fractional excretion of sodium returned less than 1 which was consistent with prerenal azotemia.  After volume resuscitation, patient demonstrated that he was hemoconcentrated with a drop of 2 g in the hemoglobin. With volume resuscitation, urine output did improve. After patient was intubated on 10/6/2020, he underwent pulmonary lavage which showed MRSA on the PCR but no other organism.  However, patient was found to have greater than 200 white blood cells in the urine.  Patient was treated with Zosyn and doxycycline. Previously, patient had received vancomycin and developed fever while on vancomycin.  Therefore vancomycin was not continued.  Sputum subsequently grew staph aureus. Crystal Splinter was discontinued but doxycycline continued on 10/14/2020. Patient did well with spontaneous breathing trial and was extubated 10/15/2020. Subjective:     Patient remained extubated. He is lethargic on BiPAP with FiO2 of 30%. He later tolerated oxygen via nasal cannula. He remained lethargic with risk of aspiration. He was able to swallow certain oral medications with sips of water. Under supervision. Medications:     sodium chloride 75 mL/hr at 10/17/20 1256    dextrose        gabapentin  400 mg Oral BID    [START ON 10/18/2020] modafinil  100 mg Oral Daily    pantoprazole  40 mg Intravenous Daily    enoxaparin  40 mg Subcutaneous BID    insulin glargine  38 Units Subcutaneous Nightly    insulin lispro  0-12 Units Subcutaneous TID WC    insulin lispro  0-6 Units Subcutaneous Nightly    lidocaine 1 % injection  5 mL Intradermal Once    sodium chloride flush  10 mL Intravenous 2 times per day    magnesium replacement protocol   Other RX Placeholder    phosphorus replacement protocol   Other RX Placeholder    calcium replacement protocol   Other RX Placeholder    ARIPiprazole  15 mg Oral Daily    aspirin  81 mg Oral Daily    glycopyrrolate-formoterol  2 puff Inhalation BID       Vital Signs:   Temp  98.6 °F (37 °C)  100 °F (37.8 °C)    Pulse  84  96    Resp  7Abnormal    25    BP: Systolic  011  [id number]      BP: Diastolic  68  [id number]      SpO2  96 %  100 %        General:   Acute on chronically ill obese -American patient  HEENT:  normocephalic and atraumatic. No scleral icterus. Neck: supple. No thyroidmegaly . Lungs: Initial breath sounds bilateral  Cardiac: RRR, no JVD  Abdomen: soft. Nontender. Extremities:  No clubbing, cyanosis, or edema x 4. Vasculature: capillary refill < 3 seconds. + 2 LE pulses   Skin:  warm and dry. Psych: Lethargic, non-fully responsive. Lymph:  No supraclavicular adenopathy. Neurologic:  No focal deficit.  No failure.  Patient extubated 10/15/2020. Tolerating BIPAP alternating with nasal canula   2. Acute renal failure: Patient has no IV contrast exposure.  Acute deterioration 10/6/2020.  Renal ultrasound was unimpressive.  Fractional excretion of sodium was less than 1 and was consistent with volume contraction.  Patient had hemodilution with volume resuscitation with a drop in hemoglobin of 2 g.  This also goes along with volume contraction.  Pressor requirements have significantly diminished as patient volume resuscitated. Appreciate nephrology's assistance.  Secondary to hypotension and acute tubular necrosis. 3. Sepsis: Secondary to pneumonia and COVID-19.  Associated with tachypnea and pneumonia, initially. Pneumonia was secondary to MRSA and was treated with vancomycin.  As fever developed on 10/5/2020, vancomycin was discontinued and doxycycline was initiated.  Antibiotics also expanded to include Zosyn.  Both doxycycline and Zosyn were initiated on 10/6/2020.  Urinalysis showed greater than 200 white blood cells.  I suspect new onset fever is related to urinary tract infection. Sputum PCR shows persistent MRSA, which may represent colonization. Blood cultures are negative.  Patient has completed his course of [facility] he still remains on doxycycline.  Repeat sputum culture positive for staph aureus on 10/12/2020.  Sensitivities returned resistant to doxycycline. Doxycycline discontinued on 10/16/2020. Linezolid initiated 10/16/2020.  4. Pneumonia: Secondary to MRSA.  Status post 8 days of vancomycin.  Patient developed new onset fever while on vancomycin.  Vancomycin was subsequently discontinued and doxycycline was initiated.  Radiographically, there is not much change.  As fever developed while on vancomycin, vancomycin was discontinued.  PCR of pulmonary lavage still showed MRSA within the sputum.  Now resistant to doxycycline. Transition to linezolid. Day 1/8 linezolid.   5. Anemia: Was dilutional..  6. Type 2 diabetes mellitus: Subcutaneous insulin. 7. Diastolic heart failure: On diuretic  8. Obstructive sleep apnea: Secondary morbid obesity.  CPAP/BiPAP noncompliant. Treatment with progesterone at night, and Provigil during the day.       Electronically signed by Amy Vyas MD on 10/17/2020 at 6:45 PM

## 2020-10-17 NOTE — PLAN OF CARE
Patient continues on bipap and inhaler at this time; will continue to monitor patients respiratory status

## 2020-10-17 NOTE — PROGRESS NOTES
[facility]  SPEECH THERAPY MISSED TREATMENT NOTE  [facility] CVICU 4B      Date: 10/17/2020  Patient Name: Leonie Montiel        MRN: [id number]    : 1970  (44 y.o.)    REASON FOR MISSED TREATMENT:  ST attempts to see pt for completion of skilled dysphagia intervention this date. Upon entering room, pt sleeping soundly. Requesting hold on treatment this date. Will follow up Monday, 10/19 as schedule allows.      Milly Fish M.S. Selina Wheeler 10/17/2020

## 2020-10-17 NOTE — PLAN OF CARE
Problem: Impaired respiratory status  Goal: Clear lung sounds  10/17/2020 0603 by Cecil Putnam RCP  Outcome: Ongoing     Problem: Impaired respiratory status  Goal: Patient will achieve/maintain normal respiratory rate/effort  10/17/2020 0603 by Cecil Putnam RCP  Outcome: Ongoing  Note: Patient currently on BiPAP 22/6 and 21%

## 2020-10-17 NOTE — PROGRESS NOTES
[facility]   Nephrology progress note  10/17/2020, 12:13 PM      Pt Name:    Matilde Severe  MRN:     [id number]     [place]:    1970  Admit Date:    9/23/2020  9:02 AM  Primary Care Physician:  Kalyani Irby MD   Room number  4B-06/006-A    Chief Complaint: Nephrology following for KRISTY    Subjective:  Patient seen and examined  Patient seen earlier today  In COVID unit  On BiPAP currently  No visible edema  Currently on bicarb drip  Oxygenating well  Has a Dimas catheter in place  Input and output reviewed  Weights reviewed      Objective:  24HR INTAKE/OUTPUT:      Intake/Output Summary (Last 24 hours) at 10/17/2020 1213  Last data filed at 10/17/2020 1124  Gross per 24 hour   Intake 1953.09 ml   Output 2725 ml   Net -771.91 ml     I/O last 3 completed shifts: In: 1963.1 [I.V.:1963.1]  Out: 4817 [Urine:2350]  I/O this shift:  In: -   Out: 375 [Urine:375]  Admission weight: 285 lb (129.3 kg)  Wt Readings from Last 3 Encounters:   10/16/20 (!) 319 lb 1.6 oz (144.7 kg)   09/15/20 281 lb 6.4 oz (127.6 kg)   08/21/20 (!) 306 lb 6.4 oz (139 kg)     Body mass index is 48.52 kg/m².     Physical examination  VITALS:     Vitals:    10/17/20 0700 10/17/20 0800 10/17/20 0901 10/17/20 0931   BP: (!) 120/101 130/85 (!) 139/90    Pulse: 96 96 95    Resp: 15 15 (!) 7 25   Temp:  100 °F (37.8 °C)     TempSrc:  Bladder     SpO2: 100% 100%     Weight:       Height:         Limited examination secondary to COVID-19 positive status  General Appearance: Ill-appearing but does not appear to be in any acute distress  Mouth/Throat: BiPAP mask in place  Lungs: No use of accessory muscles noted  GI: Somewhat obese  Extremities: No significant edema noted      Lab Data  CBC:   Recent Labs     10/15/20  0503 10/16/20  0530 10/17/20  0425   WBC 10.6 9.8 9.9   HGB 8.1* 7.6* 7.1*   HCT 27.0* 25.8* 23.0*   * 478* 512*     BMP:  Recent Labs     10/15/20  0503 10/16/20  0530 10/17/20  0425   NA 144 145 146*   K 3.0* 4.5 2.6*   * 118* 119*   CO2 16* 18* 17*   BUN 31* 23* 17   CREATININE 2.4* 2.1* 1.6*   GLUCOSE 148* 249* 109*   CALCIUM 10.1 10.3 9.2     Hepatic:   Recent Labs     10/15/20  0503 10/16/20  0530 10/17/20  0425   LABALBU 2.6* 2.6* 2.5*   AST 20 14 13   ALT 29 25 17   BILITOT 0.3 0.3 0.3   ALKPHOS 90 90 75         Meds:  Infusion:    sodium bicarbonate infusion 75 mL/hr at 10/17/20 0443    dextrose       Meds:    gabapentin  400 mg Oral BID    [START ON 10/18/2020] modafinil  100 mg Oral Daily    multivitamin+  30 mL Oral Daily    pantoprazole  40 mg Intravenous Daily    enoxaparin  40 mg Subcutaneous BID    insulin glargine  38 Units Subcutaneous Nightly    insulin lispro  0-12 Units Subcutaneous TID WC    insulin lispro  0-6 Units Subcutaneous Nightly    lidocaine 1 % injection  5 mL Intradermal Once    sodium chloride flush  10 mL Intravenous 2 times per day    magnesium replacement protocol   Other RX Placeholder    phosphorus replacement protocol   Other RX Placeholder    calcium replacement protocol   Other RX Placeholder    ARIPiprazole  15 mg Oral Daily    aspirin  81 mg Oral Daily    glycopyrrolate-formoterol  2 puff Inhalation BID     Meds prn: potassium chloride, sodium chloride flush, lidocaine, acetaminophen **OR** [DISCONTINUED] acetaminophen, polyethylene glycol, promethazine **OR** ondansetron, albuterol, glucose, dextrose, glucagon (rDNA), dextrose       Impression and Plan:  1. KRISTY secondary to hypotension/septic ATN  Creatinine overall improving  Creatinine down to 1.6  2. Profound hypokalemia likely exacerbated by IV bicarbonate drip  Will stop IV bicarbonate drip for now until potassium has been replaced and within more reasonable limits  3. Hypokalemia. Patient is receiving IV potassium supplementation  4. COVID-19 positive respiratory failure  5. Hypotension resolved  6. Hypercalcemia improved  7.   Status post intubation and now extubation  8.   Insulin-dependent diabetes mellitus    D/W ICU RN    Laurie Arellano MD  [facility]

## 2020-10-17 NOTE — PLAN OF CARE
Problem: Falls - Risk of:  Goal: Will remain free from falls  Description: Will remain free from falls  10/16/2020 2106 by Nacho Gallegos RN  Outcome: Ongoing  Note: Call light in reach, bed in lowest position, and bed alarm activated. Education given on use of call light before ambulation and when in need of assistance. Patient expressed understanding. Hourly visual checks performed and charted. Toileting offered to patient. No falls this shift, at any time. Arm band and falling star in place. Will continue to monitor. Problem: Falls - Risk of:  Goal: Absence of physical injury  10/16/2020 2106 by Nacho Gallegos RN  Outcome: Ongoing  Note: Patient remains free of injury this shift. Call light within reach and hourly rounds performed. Problem: Skin Integrity:  Goal: Will show no infection signs and symptoms  Description: Will show no infection signs and symptoms  10/16/2020 2106 by Nacho Gallegos RN  Outcome: Ongoing  Note: No signs of new skin breakdown with each assessment. Skin remains warm, dry, intact. Mucous membranes pink & moist. Turning patient every 2hrs. Problem: Discharge Planning:  Goal: Discharged to appropriate level of care  Description: Discharged to appropriate level of care  10/16/2020 2106 by Nacho Gallegos RN  Outcome: Ongoing  Note: Discharge planning in process and discussed with patient/family. Social work consulted for any additional needs. Care manager aware of discharge needs. Problem: Urinary Retention:  Goal: Urinary elimination within specified parameters  Description: Urinary elimination within specified parameters  10/16/2020 2106 by Nacho Gallegos RN  Outcome: Ongoing  Note: Dimas remains in place. Dimas care provided this shift.       Problem: Nutrition  Goal: Optimal nutrition therapy  10/16/2020 2106 by Nacho Gallegos RN  Outcome: Ongoing  Note: Pt remains NPO at this time pending speech eval.      Problem: Impaired respiratory status  Goal:

## 2020-10-18 PROBLEM — U07.1 PNEUMONIA DUE TO COVID-19 VIRUS: Status: ACTIVE | Noted: 2020-10-18

## 2020-10-18 PROBLEM — J12.82 PNEUMONIA DUE TO COVID-19 VIRUS: Status: ACTIVE | Noted: 2020-10-18

## 2020-10-18 LAB
ALBUMIN SERPL-MCNC: 2.7 G/DL (ref 3.5–5.1)
ALP BLD-CCNC: 81 U/L (ref 38–126)
ALT SERPL-CCNC: 17 U/L (ref 11–66)
ANION GAP SERPL CALCULATED.3IONS-SCNC: 10 MEQ/L (ref 8–16)
AST SERPL-CCNC: 15 U/L (ref 5–40)
BASOPHILS # BLD: 0.5 %
BASOPHILS ABSOLUTE: 0 THOU/MM3 (ref 0–0.1)
BILIRUB SERPL-MCNC: 0.4 MG/DL (ref 0.3–1.2)
BUN BLDV-MCNC: 17 MG/DL (ref 7–22)
CALCIUM SERPL-MCNC: 10.7 MG/DL (ref 8.5–10.5)
CHLORIDE BLD-SCNC: 119 MEQ/L (ref 98–111)
CO2: 21 MEQ/L (ref 23–33)
CREAT SERPL-MCNC: 1.7 MG/DL (ref 0.4–1.2)
EOSINOPHIL # BLD: 4.1 %
EOSINOPHILS ABSOLUTE: 0.4 THOU/MM3 (ref 0–0.4)
ERYTHROCYTE [DISTWIDTH] IN BLOOD BY AUTOMATED COUNT: 18.2 % (ref 11.5–14.5)
ERYTHROCYTE [DISTWIDTH] IN BLOOD BY AUTOMATED COUNT: 59.3 FL (ref 35–45)
GFR SERPL CREATININE-BSD FRML MDRD: 51 ML/MIN/1.73M2
GLUCOSE BLD-MCNC: 83 MG/DL (ref 70–108)
GLUCOSE BLD-MCNC: 83 MG/DL (ref 70–108)
GLUCOSE BLD-MCNC: 85 MG/DL (ref 70–108)
GLUCOSE BLD-MCNC: 92 MG/DL (ref 70–108)
GLUCOSE BLD-MCNC: 93 MG/DL (ref 70–108)
HCT VFR BLD CALC: 26.6 % (ref 42–52)
HCT VFR BLD CALC: 26.6 % (ref 42–52)
HEMOGLOBIN: 7.9 GM/DL (ref 14–18)
HEMOGLOBIN: 8 GM/DL (ref 14–18)
IMMATURE GRANS (ABS): 0.21 THOU/MM3 (ref 0–0.07)
IMMATURE GRANULOCYTES: 2.2 %
LYMPHOCYTES # BLD: 12.6 %
LYMPHOCYTES ABSOLUTE: 1.2 THOU/MM3 (ref 1–4.8)
MCH RBC QN AUTO: 27.3 PG (ref 26–33)
MCHC RBC AUTO-ENTMCNC: 29.7 GM/DL (ref 32.2–35.5)
MCV RBC AUTO: 92 FL (ref 80–94)
MONOCYTES # BLD: 7.6 %
MONOCYTES ABSOLUTE: 0.7 THOU/MM3 (ref 0.4–1.3)
NUCLEATED RED BLOOD CELLS: 0 /100 WBC
PLATELET # BLD: 550 THOU/MM3 (ref 130–400)
PMV BLD AUTO: 10.2 FL (ref 9.4–12.4)
POTASSIUM SERPL-SCNC: 3.4 MEQ/L (ref 3.5–5.2)
POTASSIUM SERPL-SCNC: 3.5 MEQ/L (ref 3.5–5.2)
RBC # BLD: 2.89 MILL/MM3 (ref 4.7–6.1)
SEG NEUTROPHILS: 73 %
SEGMENTED NEUTROPHILS ABSOLUTE COUNT: 7.1 THOU/MM3 (ref 1.8–7.7)
SODIUM BLD-SCNC: 150 MEQ/L (ref 135–145)
SODIUM BLD-SCNC: 151 MEQ/L (ref 135–145)
TOTAL PROTEIN: 6.2 G/DL (ref 6.1–8)
WBC # BLD: 9.7 THOU/MM3 (ref 4.8–10.8)

## 2020-10-18 PROCEDURE — 2580000003 HC RX 258: Performed by: NURSE PRACTITIONER

## 2020-10-18 PROCEDURE — 2580000003 HC RX 258: Performed by: INTERNAL MEDICINE

## 2020-10-18 PROCEDURE — 94640 AIRWAY INHALATION TREATMENT: CPT

## 2020-10-18 PROCEDURE — 6370000000 HC RX 637 (ALT 250 FOR IP): Performed by: INTERNAL MEDICINE

## 2020-10-18 PROCEDURE — 51702 INSERT TEMP BLADDER CATH: CPT

## 2020-10-18 PROCEDURE — 82948 REAGENT STRIP/BLOOD GLUCOSE: CPT

## 2020-10-18 PROCEDURE — 85014 HEMATOCRIT: CPT

## 2020-10-18 PROCEDURE — 94761 N-INVAS EAR/PLS OXIMETRY MLT: CPT

## 2020-10-18 PROCEDURE — 84295 ASSAY OF SERUM SODIUM: CPT

## 2020-10-18 PROCEDURE — 80053 COMPREHEN METABOLIC PANEL: CPT

## 2020-10-18 PROCEDURE — 6370000000 HC RX 637 (ALT 250 FOR IP): Performed by: HOSPITALIST

## 2020-10-18 PROCEDURE — C9113 INJ PANTOPRAZOLE SODIUM, VIA: HCPCS | Performed by: INTERNAL MEDICINE

## 2020-10-18 PROCEDURE — 6360000002 HC RX W HCPCS: Performed by: INTERNAL MEDICINE

## 2020-10-18 PROCEDURE — 99232 SBSQ HOSP IP/OBS MODERATE 35: CPT | Performed by: INTERNAL MEDICINE

## 2020-10-18 PROCEDURE — 85018 HEMOGLOBIN: CPT

## 2020-10-18 PROCEDURE — 2100000000 HC CCU R&B

## 2020-10-18 PROCEDURE — 99233 SBSQ HOSP IP/OBS HIGH 50: CPT | Performed by: INTERNAL MEDICINE

## 2020-10-18 PROCEDURE — 6360000002 HC RX W HCPCS: Performed by: NURSE PRACTITIONER

## 2020-10-18 PROCEDURE — 85025 COMPLETE CBC W/AUTO DIFF WBC: CPT

## 2020-10-18 PROCEDURE — 36415 COLL VENOUS BLD VENIPUNCTURE: CPT

## 2020-10-18 PROCEDURE — 84132 ASSAY OF SERUM POTASSIUM: CPT

## 2020-10-18 RX ORDER — POTASSIUM CHLORIDE 20 MEQ/1
40 TABLET, EXTENDED RELEASE ORAL ONCE
Status: DISCONTINUED | OUTPATIENT
Start: 2020-10-18 | End: 2020-10-23

## 2020-10-18 RX ORDER — DEXTROSE MONOHYDRATE 50 MG/ML
INJECTION, SOLUTION INTRAVENOUS CONTINUOUS
Status: DISCONTINUED | OUTPATIENT
Start: 2020-10-18 | End: 2020-10-19

## 2020-10-18 RX ADMIN — DEXTROSE MONOHYDRATE: 50 INJECTION, SOLUTION INTRAVENOUS at 15:46

## 2020-10-18 RX ADMIN — PANTOPRAZOLE SODIUM 40 MG: 40 INJECTION, POWDER, FOR SOLUTION INTRAVENOUS at 08:00

## 2020-10-18 RX ADMIN — ENOXAPARIN SODIUM 40 MG: 40 INJECTION SUBCUTANEOUS at 08:00

## 2020-10-18 RX ADMIN — POTASSIUM CHLORIDE 20 MEQ: 400 INJECTION, SOLUTION INTRAVENOUS at 02:24

## 2020-10-18 RX ADMIN — GLYCOPYRROLATE AND FORMOTEROL FUMARATE 2 PUFF: 9; 4.8 AEROSOL, METERED RESPIRATORY (INHALATION) at 17:49

## 2020-10-18 RX ADMIN — POTASSIUM CHLORIDE 20 MEQ: 400 INJECTION, SOLUTION INTRAVENOUS at 00:11

## 2020-10-18 RX ADMIN — POTASSIUM CHLORIDE 20 MEQ: 400 INJECTION, SOLUTION INTRAVENOUS at 19:56

## 2020-10-18 RX ADMIN — POTASSIUM CHLORIDE 20 MEQ: 400 INJECTION, SOLUTION INTRAVENOUS at 09:00

## 2020-10-18 RX ADMIN — POTASSIUM CHLORIDE 20 MEQ: 400 INJECTION, SOLUTION INTRAVENOUS at 01:04

## 2020-10-18 RX ADMIN — SODIUM CHLORIDE, PRESERVATIVE FREE 10 ML: 5 INJECTION INTRAVENOUS at 20:00

## 2020-10-18 RX ADMIN — GLYCOPYRROLATE AND FORMOTEROL FUMARATE 2 PUFF: 9; 4.8 AEROSOL, METERED RESPIRATORY (INHALATION) at 08:32

## 2020-10-18 RX ADMIN — SODIUM CHLORIDE: 9 INJECTION, SOLUTION INTRAVENOUS at 01:48

## 2020-10-18 RX ADMIN — POTASSIUM CHLORIDE 20 MEQ: 400 INJECTION, SOLUTION INTRAVENOUS at 07:59

## 2020-10-18 RX ADMIN — MODAFINIL 100 MG: 100 TABLET ORAL at 05:31

## 2020-10-18 RX ADMIN — POTASSIUM CHLORIDE 20 MEQ: 400 INJECTION, SOLUTION INTRAVENOUS at 18:32

## 2020-10-18 ASSESSMENT — PAIN SCALES - WONG BAKER

## 2020-10-18 ASSESSMENT — PAIN SCALES - GENERAL
PAINLEVEL_OUTOF10: 0
PAINLEVEL_OUTOF10: 0

## 2020-10-18 NOTE — PROGRESS NOTES
[facility]   Nephrology progress note  10/18/2020, 12:31 PM      Pt Name:    Alfred Neff  MRN:     [id number]     [place]:    1970  Admit Date:    9/23/2020  9:02 AM  Primary Care Physician:  Chantell Belle MD   Room number  4B-06/006-A    Chief Complaint: Nephrology following for KRISTY    Subjective:  Patient seen and examined earlier today  Late entry  In COVID unit  On nasal cannula  No visible edema  On normal saline  Discussed with RN    Objective:  24HR INTAKE/OUTPUT:      Intake/Output Summary (Last 24 hours) at 10/18/2020 1231  Last data filed at 10/18/2020 0500  Gross per 24 hour   Intake 3057.78 ml   Output 1900 ml   Net 1157.78 ml     I/O last 3 completed shifts: In: 3057.8 [I.V.:3057.8]  Out: 5628 [Urine:2275]  No intake/output data recorded. Admission weight: 285 lb (129.3 kg)  Wt Readings from Last 3 Encounters:   10/18/20 (!) 322 lb 9.6 oz (146.3 kg)   09/15/20 281 lb 6.4 oz (127.6 kg)   08/21/20 (!) 306 lb 6.4 oz (139 kg)     Body mass index is 49.05 kg/m².     Physical examination  VITALS:     Vitals:    10/18/20 0336 10/18/20 0800 10/18/20 0834 10/18/20 1200   BP: (!) 146/96      Pulse:       Resp:       Temp: 99.1 °F (37.3 °C) 99.5 °F (37.5 °C)  99.1 °F (37.3 °C)   TempSrc: Bladder Bladder  Bladder   SpO2:   97%    Weight: (!) 322 lb 9.6 oz (146.3 kg)      Height:         Limited examination secondary to COVID-19 positive status  General Appearance: Ill-appearing but does not appear to be in any acute distress  Lungs: No use of accessory muscles noted  GI: Somewhat obese  Extremities: No significant edema noted      Lab Data  CBC:   Recent Labs     10/16/20  0530 10/17/20  0425 10/18/20  0535   WBC 9.8 9.9 9.7   HGB 7.6* 7.1* 7.9*   HCT 25.8* 23.0* 26.6*   * 512* 550*     BMP:  Recent Labs     10/16/20  0530 10/17/20  0425 10/17/20  1220 10/17/20  2300 10/18/20  0535    146*  --   --  150*   K 4.5 2.6* 3.0* 3.0* 3.4*   * 119*  -- --  119*   CO2 18* 17*  --   --  21*   BUN 23* 17  --   --  17   CREATININE 2.1* 1.6*  --   --  1.7*   GLUCOSE 249* 109*  --   --  92   CALCIUM 10.3 9.2  --   --  10.7*     Hepatic:   Recent Labs     10/16/20  0530 10/17/20  0425 10/18/20  0535   LABALBU 2.6* 2.5* 2.7*   AST 14 13 15   ALT 25 17 17   BILITOT 0.3 0.3 0.4   ALKPHOS 90 75 81         Meds:  Infusion:    sodium chloride 75 mL/hr at 10/18/20 0148    dextrose       Meds:    gabapentin  400 mg Oral BID    modafinil  100 mg Oral Daily    pantoprazole  40 mg Intravenous Daily    enoxaparin  40 mg Subcutaneous BID    insulin glargine  38 Units Subcutaneous Nightly    insulin lispro  0-12 Units Subcutaneous TID WC    insulin lispro  0-6 Units Subcutaneous Nightly    lidocaine 1 % injection  5 mL Intradermal Once    sodium chloride flush  10 mL Intravenous 2 times per day    magnesium replacement protocol   Other RX Placeholder    phosphorus replacement protocol   Other RX Placeholder    calcium replacement protocol   Other RX Placeholder    ARIPiprazole  15 mg Oral Daily    aspirin  81 mg Oral Daily    glycopyrrolate-formoterol  2 puff Inhalation BID     Meds prn: potassium chloride, sodium chloride flush, lidocaine, acetaminophen **OR** [DISCONTINUED] acetaminophen, polyethylene glycol, promethazine **OR** ondansetron, albuterol, glucose, dextrose, glucagon (rDNA), dextrose       Impression and Plan:  1. KRISTY secondary to hypotension/septic ATN  Creatinine overall improving  2. Hypernatremia. Will stop normal saline. Will start patient on D5W. Repeat sodium level later tonight  3. Hypokalemia. Will replace with 40 mEq potassium  4.  COVID-19 positive respiratory failure: Respiratory status improved  5. Hypotension resolved  6. Mild Hypercalcemia: ?  Immobilization versus volume depletion, will monitor  7. Status post intubation and now extubation  8.   Insulin-dependent diabetes mellitus    D/W ICU RN    Bravo Power MD  [facility]

## 2020-10-18 NOTE — PLAN OF CARE
Problem: Falls - Risk of:  Goal: Will remain free from falls  Description: Will remain free from falls  Outcome: Ongoing  Note: Absence of falls this shift. Fall prevention in place. Fall band applied. Bed alarm activated as needed. Problem: Falls - Risk of:  Goal: Absence of physical injury  Outcome: Ongoing  Note: Absence of physical injury. Problem: Skin Integrity:  Goal: Will show no infection signs and symptoms  Description: Will show no infection signs and symptoms  Outcome: Ongoing  Note: Pt afebrile. IV antibiotics given per order. Problem: Skin Integrity:  Goal: Absence of new skin breakdown  Description: Absence of new skin breakdown  Outcome: Ongoing  Note: Pt has a stage 3 pressure ulcer on rectum. Stoma powder, and EPC cream applied. Pt turned and repositioned every two hours, and as needed. Problem: Discharge Planning:  Goal: Discharged to appropriate level of care  Description: Discharged to appropriate level of care  Outcome: Ongoing  Note: Pt plans to be discharged to TCU for therapy when medically stable. Problem: Urinary Retention:  Goal: Urinary elimination within specified parameters  Description: Urinary elimination within specified parameters  Outcome: Ongoing  Note: Dimas remains in place. Problem: Nutrition  Goal: Optimal nutrition therapy  Outcome: Ongoing  Note: Pt remains NPO. Problem: Impaired respiratory status  Goal: Patient will achieve/maintain normal respiratory rate/effort  Outcome: Ongoing  Note: Pt on 2 Liters of oxygen with oxygen saturation above 90%. Problem: Impaired respiratory status  Goal: Clear lung sounds  Outcome: Ongoing  Note: Rhonchi throughout. Problem: Serum Glucose Level - Abnormal:  Goal: Ability to maintain appropriate glucose levels will improve  Description: Ability to maintain appropriate glucose levels will improve  Outcome: Ongoing  Note: Blood sugar checked before meals and at bedtime. PRN coverage given as needed. Care plan reviewed with patient. Patient verbalize understanding of the plan of care and contribute to goal setting.

## 2020-10-18 NOTE — PROGRESS NOTES
Critical care Note:      CC: Over 19 pneumonia with acute respiratory failure    HPI:   Patient is a 58-year-old morbidly obese black male lifetime non-smoker. Reggie Casillas has a history of morbid obesity associated with type 2 diabetes mellitus, prior alcohol abuse, hyperlipidemia, hypertension, hypogonadism, nonalcoholic steatohepatitis, obstructive sleep apnea, and gout.  Patient was hospitalized 9/6/2020 through 9/15/2020 with hypoxemic respiratory failure secondary to COVID-19 associated with diffuse bilateral infiltrates.  At that time, patient received Decadron, remdesivir, and danazol.  During hospitalization, he had issues with atrial fibrillation.  His insulin therapy required adjustment.  He was discharged home on subcutaneous Lovenox. Patient returned back to the emergency room on 9/23/2020 with increasing SOB and progressive hypoxia. CXR showed diffuse infiltrates.  Deteriorated and required intubation. Patient underwent bronchoscopy on 9/27/2020 which demonstrated no mucus production. Patient extubated 10/2/2020. Patient reintubated for progressive respiratory failure with progressive obtundation on 10/6/2020.  This was associated with acute oliguric renal failure.  Patient was intubated 10/6/2020, and underwent volume resuscitation.  Fractional excretion of sodium returned less than 1 which was consistent with prerenal azotemia.  After volume resuscitation, patient demonstrated that he was hemoconcentrated with a drop of 2 g in the hemoglobin. With volume resuscitation, urine output did improve. After patient was intubated on 10/6/2020, he underwent pulmonary lavage which showed MRSA on the PCR but no other organism.  However, patient was found to have greater than 200 white blood cells in the urine.  Patient was treated with Zosyn and doxycycline. Previously, patient had received vancomycin and developed fever while on vancomycin.  Therefore vancomycin was not continued.  Sputum subsequently grew staph aureus. Miguel Ángel Blower was discontinued but doxycycline continued on 10/14/2020. Patient did well with spontaneous breathing trial and was extubated 10/15/2020. Subjective:     Patient is more awake and responsive today. Was weaned off BiPAP to nasal cannula 2 L/min and satting okay. He tolerated oral medication. He is receiving aspirin and Lovenox. Medications:     dextrose      dextrose        potassium chloride  40 mEq Oral Once    gabapentin  400 mg Oral BID    modafinil  100 mg Oral Daily    pantoprazole  40 mg Intravenous Daily    enoxaparin  40 mg Subcutaneous BID    insulin glargine  38 Units Subcutaneous Nightly    insulin lispro  0-12 Units Subcutaneous TID WC    insulin lispro  0-6 Units Subcutaneous Nightly    lidocaine 1 % injection  5 mL Intradermal Once    sodium chloride flush  10 mL Intravenous 2 times per day    magnesium replacement protocol   Other RX Placeholder    phosphorus replacement protocol   Other RX Placeholder    calcium replacement protocol   Other RX Placeholder    ARIPiprazole  15 mg Oral Daily    aspirin  81 mg Oral Daily    glycopyrrolate-formoterol  2 puff Inhalation BID       Vital Signs:   Flowsheet Row Name  Min  Max    Temp  98.4 °F (36.9 °C)  99.5 °F (37.5 °C)    Pulse  91  94    Resp  16  21    BP: Systolic  390  [id number]      BP: Diastolic  79  [id number]      SpO2  97 %  100 %          General:   She is more alert and oriented  HEENT:  normocephalic and atraumatic. No scleral icterus. Neck: supple. No thyroidmegaly . Lungs: Decreased breath sounds bilateral  Cardiac: RRR, no JVD  Abdomen: soft. Nontender. Extremities:  No clubbing, cyanosis, or edema x 4. Vasculature: capillary refill < 3 seconds. + 2 LE pulses   Skin:  warm and dry. Psych: Awake and responsive  Lymph:  No supraclavicular adenopathy. Neurologic:  No focal deficit.  No seizures     Data: (All radiographs, tracings, PFTs, and imaging are personally viewed and interpreted unless otherwise noted). Ref. Range 10/18/2020 05:35   Sodium Latest Ref Range: 135 - 145 meq/L 150 (H)   Potassium Latest Ref Range: 3.5 - 5.2 meq/L 3.4 (L)   Chloride Latest Ref Range: 98 - 111 meq/L 119 (H)   CO2 Latest Ref Range: 23 - 33 meq/L 21 (L)   BUN Latest Ref Range: 7 - 22 mg/dL 17   Creatinine Latest Ref Range: 0.4 - 1.2 mg/dL 1.7 (H)   Anion Gap Latest Ref Range: 8.0 - 16.0 meq/L 10.0   Est, Glom Filt Rate Latest Units: ml/min/1.73m2 51 (A)   Glucose Latest Ref Range: 70 - 108 mg/dL 92   Calcium Latest Ref Range: 8.5 - 10.5 mg/dL 10.7 (H)   Total Protein Latest Ref Range: 6.1 - 8.0 g/dL 6.2   Albumin Latest Ref Range: 3.5 - 5.1 g/dL 2.7 (L)   Alk Phos Latest Ref Range: 38 - 126 U/L 81   ALT Latest Ref Range: 11 - 66 U/L 17   AST Latest Ref Range: 5 - 40 U/L 15   Bilirubin Latest Ref Range: 0.3 - 1.2 mg/dL 0.4   WBC Latest Ref Range: 4.8 - 10.8 thou/mm3 9.7   RBC Latest Ref Range: 4.70 - 6.10 mill/mm3 2.89 (L)   Hemoglobin Quant Latest Ref Range: 14.0 - 18.0 gm/dl 7.9 (L)   Hematocrit Latest Ref Range: 42.0 - 52.0 % 26.6 (L)   MCV Latest Ref Range: 80.0 - 94.0 fL 92.0   MCH Latest Ref Range: 26.0 - 33.0 pg 27.3   MCHC Latest Ref Range: 32.2 - 35.5 gm/dl 29.7 (L)   MPV Latest Ref Range: 9.4 - 12.4 fL 10.2   RDW-CV Latest Ref Range: 11.5 - 14.5 % 18.2 (H)   RDW-SD Latest Ref Range: 35.0 - 45.0 fL 59.3 (H)   Platelet Count Latest Ref Range: 130 - 400 thou/mm3 550 (H)         Assessment and Plan:          1. Acute hypoxemic respiratory failure: Patient extubated 10/2/2020.  On low-flow oxygen.  Although patient had ongoing radiographic improvement, he had progressive lethargy and subsequent obtundation.  Patient intubated 10/6/2020 for hypercarbic hypoxemic respiratory failure.  Patient extubated 10/15/2020. He is currently on oxygen by nasal cannula 2 L/min  2.  Acute renal failure: Patient has no IV contrast exposure.  Acute deterioration 10/6/2020.  Renal ultrasound was unimpressive.  Fractional excretion of sodium was less than 1 and was consistent with volume contraction.  Patient had hemodilution with volume resuscitation with a drop in hemoglobin of 2 g.  This also goes along with volume contraction.  Pressor requirements have significantly diminished as patient volume resuscitated. Appreciate nephrology's assistance.  Secondary to hypotension and acute tubular necrosis. 3. Sepsis: Secondary to pneumonia and COVID-19.  Associated with tachypnea and pneumonia, initially. Pneumonia was secondary to MRSA and was treated with vancomycin.  As fever developed on 10/5/2020, vancomycin was discontinued and doxycycline was initiated.  Antibiotics also expanded to include Zosyn.  Both doxycycline and Zosyn were initiated on 10/6/2020.  Urinalysis showed greater than 200 white blood cells.  I suspect new onset fever is related to urinary tract infection. Sputum PCR shows persistent MRSA, which may represent colonization. Blood cultures are negative.  Patient has completed his course of Dorette Isaiah he still remains on doxycycline.  Repeat sputum culture positive for staph aureus on 10/12/2020.  Sensitivities returned resistant to doxycycline. Doxycycline discontinued on 10/16/2020. Linezolid initiated 10/16/2020. Continue total 8 days  4. Pneumonia: Secondary to MRSA.  Status post 8 days of vancomycin.  Patient developed new onset fever while on vancomycin.  Vancomycin was subsequently discontinued and doxycycline was initiated.  Radiographically, there is not much change.  As fever developed while on vancomycin, vancomycin was discontinued.  PCR of pulmonary lavage still showed MRSA within the sputum.  Now resistant to doxycycline. Transition to linezolid. Day 1/8 linezolid. 5. Anemia: Was dilutional..  6. Type 2 diabetes mellitus: Subcutaneous insulin. 7. Diastolic heart failure: On diuretic  8. Obstructive sleep apnea: Secondary morbid obesity.  CPAP/BiPAP noncompliant.   Treatment with progesterone at night, and Provigil during the day.       Electronically signed by Louise Jerez MD on 10/18/2020 at 3:19 PM

## 2020-10-19 ENCOUNTER — APPOINTMENT (OUTPATIENT)
Dept: GENERAL RADIOLOGY | Age: 50
DRG: 870 | End: 2020-10-19
Payer: MEDICARE

## 2020-10-19 LAB
ABSOLUTE RETIC #: 149 THOU/MM3 (ref 20–115)
ALBUMIN SERPL-MCNC: 2.9 G/DL (ref 3.5–5.1)
ALP BLD-CCNC: 81 U/L (ref 38–126)
ALT SERPL-CCNC: 16 U/L (ref 11–66)
ANION GAP SERPL CALCULATED.3IONS-SCNC: 13 MEQ/L (ref 8–16)
AST SERPL-CCNC: 16 U/L (ref 5–40)
BASOPHILS # BLD: 0.5 %
BASOPHILS ABSOLUTE: 0 THOU/MM3 (ref 0–0.1)
BILIRUB SERPL-MCNC: 0.4 MG/DL (ref 0.3–1.2)
BUN BLDV-MCNC: 16 MG/DL (ref 7–22)
CALCIUM SERPL-MCNC: 10.8 MG/DL (ref 8.5–10.5)
CHLORIDE BLD-SCNC: 118 MEQ/L (ref 98–111)
CO2: 19 MEQ/L (ref 23–33)
CREAT SERPL-MCNC: 1.7 MG/DL (ref 0.4–1.2)
EOSINOPHIL # BLD: 4 %
EOSINOPHILS ABSOLUTE: 0.4 THOU/MM3 (ref 0–0.4)
ERYTHROCYTE [DISTWIDTH] IN BLOOD BY AUTOMATED COUNT: 18.3 % (ref 11.5–14.5)
ERYTHROCYTE [DISTWIDTH] IN BLOOD BY AUTOMATED COUNT: 60.3 FL (ref 35–45)
FERRITIN: 207 NG/ML (ref 22–322)
FOLATE: > 20 NG/ML (ref 4.8–24.2)
GFR SERPL CREATININE-BSD FRML MDRD: 51 ML/MIN/1.73M2
GLUCOSE BLD-MCNC: 105 MG/DL (ref 70–108)
GLUCOSE BLD-MCNC: 107 MG/DL (ref 70–108)
GLUCOSE BLD-MCNC: 109 MG/DL (ref 70–108)
GLUCOSE BLD-MCNC: 115 MG/DL (ref 70–108)
GLUCOSE BLD-MCNC: 116 MG/DL (ref 70–108)
HCT VFR BLD CALC: 25.8 % (ref 42–52)
HCT VFR BLD CALC: 26.9 % (ref 42–52)
HEMOGLOBIN: 8 GM/DL (ref 14–18)
HEMOGLOBIN: 8.1 GM/DL (ref 14–18)
IMMATURE GRANS (ABS): 0.13 THOU/MM3 (ref 0–0.07)
IMMATURE GRANULOCYTES: 1.4 %
IMMATURE RETIC FRACT: 33.1 % (ref 2.3–13.4)
IRON SATURATION: 13 % (ref 20–50)
IRON: 20 UG/DL (ref 65–195)
LYMPHOCYTES # BLD: 11.7 %
LYMPHOCYTES ABSOLUTE: 1.1 THOU/MM3 (ref 1–4.8)
MCH RBC QN AUTO: 27.8 PG (ref 26–33)
MCHC RBC AUTO-ENTMCNC: 30.1 GM/DL (ref 32.2–35.5)
MCV RBC AUTO: 92.4 FL (ref 80–94)
MONOCYTES # BLD: 8.6 %
MONOCYTES ABSOLUTE: 0.8 THOU/MM3 (ref 0.4–1.3)
NUCLEATED RED BLOOD CELLS: 0 /100 WBC
PLATELET # BLD: 540 THOU/MM3 (ref 130–400)
PMV BLD AUTO: 9.7 FL (ref 9.4–12.4)
POTASSIUM SERPL-SCNC: 3.6 MEQ/L (ref 3.5–5.2)
RBC # BLD: 2.91 MILL/MM3 (ref 4.7–6.1)
RETIC HEMOGLOBIN: 30.5 PG (ref 28.2–35.7)
RETICULOCYTE ABSOLUTE COUNT: 4.5 % (ref 0.5–2)
SEG NEUTROPHILS: 73.8 %
SEGMENTED NEUTROPHILS ABSOLUTE COUNT: 7 THOU/MM3 (ref 1.8–7.7)
SODIUM BLD-SCNC: 150 MEQ/L (ref 135–145)
TOTAL IRON BINDING CAPACITY: 153 UG/DL (ref 171–450)
TOTAL PROTEIN: 6.4 G/DL (ref 6.1–8)
VITAMIN B-12: 1327 PG/ML (ref 211–911)
WBC # BLD: 9.5 THOU/MM3 (ref 4.8–10.8)

## 2020-10-19 PROCEDURE — 82948 REAGENT STRIP/BLOOD GLUCOSE: CPT

## 2020-10-19 PROCEDURE — 82728 ASSAY OF FERRITIN: CPT

## 2020-10-19 PROCEDURE — 83540 ASSAY OF IRON: CPT

## 2020-10-19 PROCEDURE — C9113 INJ PANTOPRAZOLE SODIUM, VIA: HCPCS | Performed by: INTERNAL MEDICINE

## 2020-10-19 PROCEDURE — 2100000000 HC CCU R&B

## 2020-10-19 PROCEDURE — 6370000000 HC RX 637 (ALT 250 FOR IP): Performed by: NURSE PRACTITIONER

## 2020-10-19 PROCEDURE — 82746 ASSAY OF FOLIC ACID SERUM: CPT

## 2020-10-19 PROCEDURE — 2580000003 HC RX 258: Performed by: INTERNAL MEDICINE

## 2020-10-19 PROCEDURE — 99233 SBSQ HOSP IP/OBS HIGH 50: CPT | Performed by: INTERNAL MEDICINE

## 2020-10-19 PROCEDURE — 2580000003 HC RX 258: Performed by: NURSE PRACTITIONER

## 2020-10-19 PROCEDURE — 82607 VITAMIN B-12: CPT

## 2020-10-19 PROCEDURE — 85018 HEMOGLOBIN: CPT

## 2020-10-19 PROCEDURE — 94660 CPAP INITIATION&MGMT: CPT

## 2020-10-19 PROCEDURE — 83550 IRON BINDING TEST: CPT

## 2020-10-19 PROCEDURE — 36415 COLL VENOUS BLD VENIPUNCTURE: CPT

## 2020-10-19 PROCEDURE — 6360000002 HC RX W HCPCS: Performed by: INTERNAL MEDICINE

## 2020-10-19 PROCEDURE — 80053 COMPREHEN METABOLIC PANEL: CPT

## 2020-10-19 PROCEDURE — 85014 HEMATOCRIT: CPT

## 2020-10-19 PROCEDURE — 6370000000 HC RX 637 (ALT 250 FOR IP): Performed by: HOSPITALIST

## 2020-10-19 PROCEDURE — 85046 RETICYTE/HGB CONCENTRATE: CPT

## 2020-10-19 PROCEDURE — 94761 N-INVAS EAR/PLS OXIMETRY MLT: CPT

## 2020-10-19 PROCEDURE — 6370000000 HC RX 637 (ALT 250 FOR IP): Performed by: INTERNAL MEDICINE

## 2020-10-19 PROCEDURE — 6360000002 HC RX W HCPCS: Performed by: NURSE PRACTITIONER

## 2020-10-19 PROCEDURE — 85025 COMPLETE CBC W/AUTO DIFF WBC: CPT

## 2020-10-19 PROCEDURE — 94640 AIRWAY INHALATION TREATMENT: CPT

## 2020-10-19 PROCEDURE — 2700000000 HC OXYGEN THERAPY PER DAY

## 2020-10-19 RX ORDER — HYDROCORTISONE ACETATE 25 MG/1
25 SUPPOSITORY RECTAL 2 TIMES DAILY
Status: DISCONTINUED | OUTPATIENT
Start: 2020-10-19 | End: 2020-10-21

## 2020-10-19 RX ORDER — INSULIN GLARGINE 100 [IU]/ML
10 INJECTION, SOLUTION SUBCUTANEOUS NIGHTLY
Status: DISCONTINUED | OUTPATIENT
Start: 2020-10-19 | End: 2020-10-19

## 2020-10-19 RX ORDER — PANTOPRAZOLE SODIUM 40 MG/1
40 TABLET, DELAYED RELEASE ORAL DAILY
Status: DISCONTINUED | OUTPATIENT
Start: 2020-10-19 | End: 2020-10-19

## 2020-10-19 RX ORDER — LINEZOLID 600 MG/1
600 TABLET, FILM COATED ORAL EVERY 12 HOURS SCHEDULED
Status: DISCONTINUED | OUTPATIENT
Start: 2020-10-19 | End: 2020-10-19

## 2020-10-19 RX ORDER — PANTOPRAZOLE SODIUM 40 MG/10ML
40 INJECTION, POWDER, LYOPHILIZED, FOR SOLUTION INTRAVENOUS DAILY
Status: DISCONTINUED | OUTPATIENT
Start: 2020-10-19 | End: 2020-10-21

## 2020-10-19 RX ADMIN — ARIPIPRAZOLE 15 MG: 15 TABLET ORAL at 11:00

## 2020-10-19 RX ADMIN — HYDROCORTISONE ACETATE 25 MG: 25 SUPPOSITORY RECTAL at 22:51

## 2020-10-19 RX ADMIN — PANTOPRAZOLE SODIUM 40 MG: 40 INJECTION, POWDER, FOR SOLUTION INTRAVENOUS at 22:52

## 2020-10-19 RX ADMIN — GLYCOPYRROLATE AND FORMOTEROL FUMARATE 2 PUFF: 9; 4.8 AEROSOL, METERED RESPIRATORY (INHALATION) at 17:30

## 2020-10-19 RX ADMIN — DEXTROSE MONOHYDRATE: 50 INJECTION, SOLUTION INTRAVENOUS at 00:32

## 2020-10-19 RX ADMIN — SODIUM CHLORIDE, PRESERVATIVE FREE 10 ML: 5 INJECTION INTRAVENOUS at 22:52

## 2020-10-19 RX ADMIN — POTASSIUM CHLORIDE: 2 INJECTION, SOLUTION, CONCENTRATE INTRAVENOUS at 18:06

## 2020-10-19 RX ADMIN — GLYCOPYRROLATE AND FORMOTEROL FUMARATE 2 PUFF: 9; 4.8 AEROSOL, METERED RESPIRATORY (INHALATION) at 07:40

## 2020-10-19 RX ADMIN — GABAPENTIN 400 MG: 400 CAPSULE ORAL at 10:57

## 2020-10-19 RX ADMIN — MODAFINIL 100 MG: 100 TABLET ORAL at 10:57

## 2020-10-19 RX ADMIN — PANTOPRAZOLE SODIUM 40 MG: 40 INJECTION, POWDER, FOR SOLUTION INTRAVENOUS at 10:58

## 2020-10-19 RX ADMIN — ASPIRIN 81 MG: 81 TABLET, CHEWABLE ORAL at 10:58

## 2020-10-19 RX ADMIN — SODIUM CHLORIDE, PRESERVATIVE FREE 10 ML: 5 INJECTION INTRAVENOUS at 10:58

## 2020-10-19 ASSESSMENT — PAIN SCALES - WONG BAKER
WONGBAKER_NUMERICALRESPONSE: 0

## 2020-10-19 ASSESSMENT — PAIN SCALES - GENERAL: PAINLEVEL_OUTOF10: 0

## 2020-10-19 NOTE — PLAN OF CARE
Problem: Impaired respiratory status  Goal: Clear lung sounds  10/19/2020 0856 by Elizabeth Shah RCP  Outcome: Ongoing    Improve breath sounds, increase aeration and decrease WOB.

## 2020-10-19 NOTE — PROGRESS NOTES
Call to Dr. Donis Santamaria. Orders rec'd to hold ASA, Lovenox, Zyvox and to switch Protonix back to IV d/t lower GI bleed. Pt is to remain NPO.

## 2020-10-19 NOTE — PROGRESS NOTES
[facility]         Renal Inpatient Follow-Up note         10/19/2020 9:23 AM    Pt Name:   Timo Gracia  YOB: 1970  Attending:   Zandra Holbrook MD    Chief Complaint : Timo Gracia is a 44 y.o. male being followed by nephrology for acute kidney injury    Interval History :   Patient seen and examined by me. No distress. Urine output is ok. He is resting comfortably.   Currently on a BiPAP  As per nursing staff not much communicative     Scheduled Medications :    potassium chloride  40 mEq Oral Once    gabapentin  400 mg Oral BID    modafinil  100 mg Oral Daily    pantoprazole  40 mg Intravenous Daily    enoxaparin  40 mg Subcutaneous BID    insulin glargine  38 Units Subcutaneous Nightly    insulin lispro  0-12 Units Subcutaneous TID WC    insulin lispro  0-6 Units Subcutaneous Nightly    lidocaine 1 % injection  5 mL Intradermal Once    sodium chloride flush  10 mL Intravenous 2 times per day    magnesium replacement protocol   Other RX Placeholder    phosphorus replacement protocol   Other RX Placeholder    calcium replacement protocol   Other RX Placeholder    ARIPiprazole  15 mg Oral Daily    aspirin  81 mg Oral Daily    glycopyrrolate-formoterol  2 puff Inhalation BID      dextrose 50 mL/hr at 10/19/20 0032    dextrose         Vitals :  BP (!) 144/94   Pulse 97   Temp 99.1 °F (37.3 °C) (Bladder)   Resp 16   Ht 5' 8\" (1.727 m)   Wt (!) 322 lb 9.6 oz (146.3 kg)   SpO2 99%   BMI 49.05 kg/m²     24HR INTAKE/OUTPUT:      Intake/Output Summary (Last 24 hours) at 10/19/2020 0923  Last data filed at 10/19/2020 0600  Gross per 24 hour   Intake 20 ml   Output 3200 ml   Net -3180 ml     Last 3 weights  Wt Readings from Last 3 Encounters:   10/18/20 (!) 322 lb 9.6 oz (146.3 kg)   09/15/20 281 lb 6.4 oz (127.6 kg)   08/21/20 (!) 306 lb 6.4 oz (139 kg)           Physical Exam : Due to COVID-19 situation termination is limited exam from outside the room  General Appearance: Obese well-nourished no distress  CNS-he is apparently responsive to some degree  Psych-not agitated  Abdomen: Appears slightly distended  Musculoskeletal:  Edema -not significant           Last 3 CBC   Recent Labs     10/17/20  0425 10/18/20  0535 10/18/20  1518 10/19/20  0029 10/19/20  0641   WBC 9.9 9.7  --   --  9.5   RBC 2.47* 2.89*  --   --  2.91*   HGB 7.1* 7.9* 8.0* 8.0* 8.1*   HCT 23.0* 26.6* 26.6* 25.8* 26.9*   * 550*  --   --  540*     Last 3 CMP  Recent Labs     10/17/20  0425  10/18/20  0535 10/18/20  1518 10/19/20  0641   *  --  150* 151* 150*   K 2.6*   < > 3.4* 3.5 3.6   *  --  119*  --  118*   CO2 17*  --  21*  --  19*   BUN 17  --  17  --  16   CREATININE 1.6*  --  1.7*  --  1.7*   CALCIUM 9.2  --  10.7*  --  10.8*   LABALBU 2.5*  --  2.7*  --  2.9*   BILITOT 0.3  --  0.4  --  0.4    < > = values in this interval not displayed. ASSESSMENT / Plan   1 Renal -acute kidney injury most likely due to septic ATN/hypotension  ? Improving with some IV hydration . Currently at 1.7  ? Continue IV fluids and monitor renal function  ? Prn diuretics    2 Electrolytes -hypokalemia- being replaced. 3 Hypernatremia-on hypotonic fluids will increase D5W to 80 mL/h  4 Metabolic acidosis stable  5 Anemia- S/P post rectal clot. Maintaining stable GI has been consulted  6 Hypotension resolved . 7 Hx of diabetes mellitus  8 Hypercalcemia trending higher-corrected calcium almost 11.7. PTH is appropriately low less than 1.2 vitamin D level is stable at 126 this is most likely hypercalcemia due to immobility if continues to rise might need to consider bisphosphonate. 9 Hx of COVID-19 pneumonia status post extubation  10 Hx of diastolic dysfunction volume status reasonable closely follow  11 Meds reviewed and discussed with the nursing staff     EMELY Urrutia D.  [facility].

## 2020-10-19 NOTE — PROGRESS NOTES
Comprehensive Nutrition Assessment    Type and Reason for Visit:  Reassess(follow-up)    Nutrition Recommendations/Plan:   Diet as per SLP: note recommendations were for NPO on 10/16. SLP to see today (10/19)  Recommend EN of Vital 1.2 at 20 ml/hr with 1 proteinex 2GO (2.5 oz liquid protein bottle) 4 times/day. Free water per MD    Nutrition Assessment:  Pt. declining from a nutritional standpoint AEB remains NPO, no EN since 10/15, on Bipap. Remains at risk for further nutritional compromise r/t admitted with acute respiratory failure with hypoxemia, +covid, morbidly obese, altered GI function (dairrhea, recent large blood clot from rectal tube site), and underlying medical condition (CAD, DM, GERD, Alcohol abuse, HLD, HTN, Vitamin D deficiency). Nutrition recommendations/interventions as per above. Malnutrition Assessment:  Malnutrition Status:  Insufficient data(+covid)    Context:  Acute Illness     Findings of the 6 clinical characteristics of malnutrition:  Energy Intake:  Mild decrease in energy intake (Comment)(good appetite or on EN at goal since admit)  Weight Loss:  (16# or 5% in 2 months)     Body Fat Loss:  Unable to assess(+covid patient)     Muscle Mass Loss:  Unable to assess(+covid)    Fluid Accumulation:  1 - Mild Extremities   Strength:  Not Performed    Estimated Daily Nutrient Needs:  Energy (kcal):  8573-2311 (30-32 kcals/kg IBW in active late phase); Weight Used for Energy Requirements:  Ideal(70 kg - ideal weight)     Protein (g):  ~140 gms (2/kgm IBW) as renal status allows; Weight Used for Protein Requirements:  Ideal(70 kg)        Fluid (ml/day):  per MD; Weight Used for Fluid Requirements:  (n/a)      Nutrition Related Findings:  +covid; extubated 10/15; OGT removed; MAP: 107. SLP evaluated patient on 10/16 and recommen NPO. Patient has not had any nutrition since 10/15. Recommend place feeding tube vs PEG and start EN. Note patient is on Bipap.  Previous issues with flexiseal (large clot at site of flexiseal so it was removed). +BM x 2 so far today. Labs: sodium: 150, POC: 107. Meds: lantus, humalog. Wounds:  Stage III(perineum; skin tear scrotum)       Current Nutrition Therapies:    NPO    Anthropometric Measures:  · Height: 5' 8\" (172.7 cm)  · Current Body Weight: 322 lb (146.1 kg)(10/18, bedscale with +1 and +2 edema)   · Admission Body Weight: 285 lb (129.3 kg)(9/23/20, stated, +1 BLE and BUE edema)    · Usual Body Weight: 306 lb (138.8 kg)(EMR, 6/29/20, actual.  299# 8/15/20, bedscale.)     · Ideal Body Weight: 154 lbs;   · BMI: 49  · Adjusted Body Weight:  ; No Adjustment   · BMI Categories: Obese Class 3 (BMI 40.0 or greater)(49.05)       Nutrition Diagnosis:   · Inadequate oral intake related to (possible swallowing difficulty) as evidenced by NPO or clear liquid status due to medical condition(lethargy; await SLP evaluation)    Nutrition Interventions:   Food and/or Nutrient Delivery:  (Oral diet per SLP. If unable to start oral diet recommend EN start)  Nutrition Education/Counseling:  No recommendation at this time   Coordination of Nutrition Care:  Continued Inpatient Monitoring, Interdisciplinary Rounds    Goals:  Patient will receive adequate nutrition in 1-4 days       Nutrition Monitoring and Evaluation:   Behavioral-Environmental Outcomes:  (n/a)   Food/Nutrient Intake Outcomes:  (oral diet vs EN)  Physical Signs/Symptoms Outcomes:  Biochemical Data, Chewing or Swallowing, Diarrhea, GI Status, Fluid Status or Edema, Hemodynamic Status, Skin, Weight     Discharge Planning:     Too soon to determine     Electronically signed by Génesis Lowery RD, LD on 10/19/20 at 11:01 AM EDT    Contact: [phone]

## 2020-10-19 NOTE — PROGRESS NOTES
Dimas catheter dc'd and external catheter placed on pt. Pt with excoriation and wounds to the rectal area. Pt is continuing to have bloody stools with blood clots noted. Call to Dr. Ying Rivera as pt has been NPO and since 10/15. Further orders rerc'd. Pt to be NPO d/t GI bleed.

## 2020-10-19 NOTE — PROGRESS NOTES
Gastroenterology Progress Note:     Patient Name:  Luis Herrera   MRN: [id number]  [id number]  YOB: 1970  Admit Date: 9/23/2020  9:02 AM  Primary Care Physician: Miriam Lebron MD   [id number]     24 hours events and chart reviewed. Subjective: Per RN, patient passing stools with red blood and clots. Hgb stable at 8.1 GI re-consulted for rectal bleeding. Objective:  /74   Pulse 97   Temp 99.7 °F (37.6 °C) (Axillary)   Resp 24   Ht 5' 8\" (1.727 m)   Wt (!) 322 lb 9.6 oz (146.3 kg)   SpO2 94%   BMI 49.05 kg/m²     Physical Exam:    Due to the COVID pandemic & conservation of PPE, a physical exam was not performed.      Labs:   CBC:   Lab Results   Component Value Date    WBC 9.5 10/19/2020    HGB 8.1 10/19/2020    HCT 26.9 10/19/2020    MCV 92.4 10/19/2020     10/19/2020     BMP:   Lab Results   Component Value Date     10/19/2020    K 3.6 10/19/2020    K 4.6 09/07/2020     10/19/2020    CO2 19 10/19/2020    PHOS 3.8 10/15/2019    BUN 16 10/19/2020    CREATININE 1.7 10/19/2020    CALCIUM 10.8 10/19/2020     PT/INR:   Lab Results   Component Value Date    PROTIME 24.4 06/27/2018    INR 1.17 09/23/2020     Lipids:   Lab Results   Component Value Date    ALKPHOS 81 10/19/2020    ALT 16 10/19/2020    AST 16 10/19/2020    BILITOT 0.4 10/19/2020    BILIDIR 0.6 09/24/2020    LABALBU 2.9 10/19/2020    LABALBU 4.4 01/26/2012    LIPASE 34.9 08/05/2020     Current Meds:  Scheduled Meds:   pantoprazole  40 mg Oral Daily    linezolid  600 mg Oral 2 times per day    potassium chloride  40 mEq Oral Once    gabapentin  400 mg Oral BID    modafinil  100 mg Oral Daily    enoxaparin  40 mg Subcutaneous BID    insulin glargine  38 Units Subcutaneous Nightly    insulin lispro  0-12 Units Subcutaneous TID WC    insulin lispro  0-6 Units Subcutaneous Nightly    lidocaine 1 % injection  5 mL Intradermal Once    sodium chloride flush  10 mL Intravenous 2 times per day    magnesium replacement protocol   Other RX Placeholder    phosphorus replacement protocol   Other RX Placeholder    calcium replacement protocol   Other RX Placeholder    ARIPiprazole  15 mg Oral Daily    aspirin  81 mg Oral Daily    glycopyrrolate-formoterol  2 puff Inhalation BID     Continuous Infusions:   dextrose 50 mL/hr at 10/19/20 0032    dextrose         Assessment:  43 yo M admitted 09/23/20 for fatigue. Recently admitted 09/06/20 for almost 10 days with hypoxemic respiratory failure secondary to COVID-19. Received Decadron and Danazol. Complained of worsening SOB & progressive hypoxia for which he was intubated. Bronchoscopy 09/27/20. Extubated 10/02/20. 10/06/20 became more obtunded & progressive respiratory failure with acute oliguric renal failure, was re-intubated. Hypotensive requiring pressor support, UO declined. He had a rectal tube for a few weeks which was over-inflated, when it was deflated & removed, he was found to have oozing of bright red blood from the rectum, therefore GI was consulted 10/12/20. It was recommended to hold anticoagulants, keep the rectal tube out, and supportive care. Started on Zyvox. Extubated and now on BiPAP during the day. Patient has been terminated from [facility] and will need to follow outpatient with a different GI practice. 1. Acute rectal bleeding with clots- suspect secondary to rectal ulceration from over inflation of rectal tube vs ischemic colitis  2. Acute hypoxemic respiratory failure secondary to #3  3. COVID-19 positive  4. Pneumonia  5. Sepsis secondary to #3 & 4  6. KRISTY   7. Hypotension  8. Acute on chronic anemia  9. DM  10. H/O HTN  11. KIARA  12. Diastolic heart failure  13.  Acute diarrhea    Plan:     Monitor H & H, transfuse prn   PPI daily   Nursing to monitor stool output   Ok to resume anticoagulation at this time   CT Abdomen/pelvis   Anusol suppository BID   Stool for cdiff    Anemia labs   Electrolyte management per nephrology   Zyvox per primary   Case discussed with RN    Case discussed with Dr. Mary Byrnes care per primary team    Case reviewed and impression/plan reviewed in collaboration with Dr. Kathryn Servin  Electronically signed by SANDRA Guardado CNP on 10/19/2020 at 4:00 PM    [facility]     This patient's record in the EMR has been reviewed and the patient has been discussed with the nursing staff and provider requesting the consultation. As per the [facility] (1-) guidelines regarding the conservation of personal protective equipment (PPE), the patient has not been seen and examined as doing so will inappropriately use PPE and expose staff to pathogens. GI will be happy to re-evaluate the patient if conditions change, otherwise, we will see the patient as an outpatient in 4-6 weeks.

## 2020-10-19 NOTE — PROGRESS NOTES
Hospitalist Progress Note      Patient:  Alba Mitchell    Unit/Bed:4B-06/006-A  YOB: 1970  MRN: [id number]   Acct: [de-identified]   PCP: Mustapha Browning MD  Date of Admission: 9/23/2020    Assessment/Plan:    1. Sepsis due to COVID pneumonia - POA   - Completed course of dissipating Decadron on previous admission. Had to be intubated and extubated twice. Patient has been on multiple different antibiotics for pneumonia. Sputum grew MRSA. Has not been able to receive Zyvox. Per ICU notes patient should be on Zyvox. Patient sputum culture grew staph on 10/12/2020, and the sensitivities showed resistance to Doxy. Doxy was stopped on 10/16/2020. Zyvox was initiated on 10/16/2020 to be completed for a total of 8 days. Patient has not received any Zyvox so far as confirmed with pharmacist.  We will start him on Zyvox to complete 8-day course. 2.  Acute hypoxic respiratory failure due to Covid, pneumonia, KIARA   -Continue to wean down oxygen as tolerated. BiPAP during day. 3. KRISTY due to hypotension, sepsis - stable   -Continue to monitor closely. Avoid nephrotoxins. 4. Hyponatremia   -D5W. Continue to monitor sodium levels closely. Nephrology is following. Appreciate recommendations. 5. Anemia -delusional and iron deficiency . - stable   -Continue to monitor. 6. KIARA/OHS    -has not been compliant with his CPAP machine. Continue stimulants during the daytime. 7. DM II   -Blood glucose much better controlled now. Continue Accu-Cheks 3 times daily and insulin as prescribed. 8.  Essential hypertension -blood pressure controlled   -Continue medications for now. If blood pressure trends up will restart other home medications.     Chief Complaint: Shortness of breath    Initial H and P:-       64-year-old morbidly obese black male lifetime non-smoker. Zandra Getachew has a history of morbid obesity associated with type 2 diabetes mellitus, prior alcohol abuse, hyperlipidemia, hypertension, hypogonadism, nonalcoholic steatohepatitis, obstructive sleep apnea, and gout.  Patient was hospitalized 9/6/2020 through 9/15/2020 with hypoxemic respiratory failure secondary to COVID-19 associated with diffuse bilateral infiltrates.  At that time, patient received Decadron, remdesivir, and danazol.  During hospitalization, he had issues with atrial fibrillation.  His insulin therapy required adjustment.  He was discharged home on subcutaneous Lovenox. Patient returned back to the emergency room on 9/23/2020 with increasing SOB and progressive hypoxia. CXR showed diffuse infiltrates.  Deteriorated and required intubation. Patient underwent bronchoscopy on 9/27/2020 which demonstrated no mucus production. Patient extubated 10/2/2020. Patient reintubated for progressive respiratory failure with progressive obtundation on 10/6/2020.  This was associated with acute oliguric renal failure.  Patient was intubated 10/6/2020, and underwent volume resuscitation.  Fractional excretion of sodium returned less than 1 which was consistent with prerenal azotemia.  After volume resuscitation, patient demonstrated that he was hemoconcentrated with a drop of 2 g in the hemoglobin. With volume resuscitation, urine output did improve. After patient was intubated on 10/6/2020, he underwent pulmonary lavage which showed MRSA on the PCR but no other organism.  However, patient was found to have greater than 200 white blood cells in the urine.  Patient was treated with Zosyn and doxycycline. Previously, patient had received vancomycin and developed fever while on vancomycin. Therefore vancomycin was not continued.  Sputum subsequently grew staph aureus.  Zosyn was discontinued but doxycycline continued on 10/14/2020. Patient did well with spontaneous breathing trial and was extubated 10/15/2020. Subjective (past 24 hours):     Seen this morning with BiPAP on. He is awake and following commands. Transferred out of ICU yesterday. No chest pain or shortness of breath. No acute events overnight. Past medical history, family history, social history and allergies reviewed again and is unchanged since admission. ROS (12 point review of systems completed. Pertinent positives noted. Otherwise ROS is negative)     Medications:  Reviewed    Infusion Medications    dextrose 50 mL/hr at 10/19/20 0032    dextrose       Scheduled Medications    potassium chloride  40 mEq Oral Once    gabapentin  400 mg Oral BID    modafinil  100 mg Oral Daily    pantoprazole  40 mg Intravenous Daily    enoxaparin  40 mg Subcutaneous BID    insulin glargine  38 Units Subcutaneous Nightly    insulin lispro  0-12 Units Subcutaneous TID WC    insulin lispro  0-6 Units Subcutaneous Nightly    lidocaine 1 % injection  5 mL Intradermal Once    sodium chloride flush  10 mL Intravenous 2 times per day    magnesium replacement protocol   Other RX Placeholder    phosphorus replacement protocol   Other RX Placeholder    calcium replacement protocol   Other RX Placeholder    ARIPiprazole  15 mg Oral Daily    aspirin  81 mg Oral Daily    glycopyrrolate-formoterol  2 puff Inhalation BID     PRN Meds: potassium chloride, sodium chloride flush, lidocaine, acetaminophen **OR** [DISCONTINUED] acetaminophen, polyethylene glycol, promethazine **OR** ondansetron, albuterol, glucose, dextrose, glucagon (rDNA), dextrose      Intake/Output Summary (Last 24 hours) at 10/19/2020 1237  Last data filed at 10/19/2020 0600  Gross per 24 hour   Intake 20 ml   Output 3200 ml   Net -3180 ml       Diet:  DIET TUBE FEED CONTINUOUS/CYCLIC NPO; Semi-elemental (Vital 1.2);  Orogastric; Continuous; 10; 20    Exam:  /84   Pulse 100   Temp 99.7 °F (37.6 °C) (Axillary)   Resp 18   Ht 5' 8\" (1.727 m)   Wt (!) 322 lb 9.6 oz (146.3 kg)   SpO2 100%   BMI 49.05 kg/m²   General appearance: No apparent distress, appears stated age and cooperative. HEENT: Pupils equal, round, and reactive to light. Conjunctivae/corneas clear. Neck: Supple, with full range of motion. No jugular venous distention. Trachea midline. Respiratory:  Decrease breath sounds b/l, limited exam due to body habitus  Cardiovascular: Regular rate and rhythm with normal S1/S2 without murmurs, rubs or gallops. Abdomen: Soft, non-tender, non-distended with normal bowel sounds. Musculoskeletal: passive and active ROM x 4 extremities. Skin: Skin color, texture, turgor normal.  No rashes or lesions. Neurologic:  Neurovascularly intact without any focal sensory/motor deficits. Cranial nerves: II-XII intact, grossly non-focal.  Psychiatric: Alert and oriented, thought content appropriate, normal insight  Capillary Refill: Brisk,< 3 seconds   Peripheral Pulses: +2 palpable, equal bilaterally     Labs:   Recent Labs     10/17/20  0425 10/18/20  0535 10/18/20  1518 10/19/20  0029 10/19/20  0641   WBC 9.9 9.7  --   --  9.5   HGB 7.1* 7.9* 8.0* 8.0* 8.1*   HCT 23.0* 26.6* 26.6* 25.8* 26.9*   * 550*  --   --  540*     Recent Labs     10/17/20  0425  10/18/20  0535 10/18/20  1518 10/19/20  0641   *  --  150* 151* 150*   K 2.6*   < > 3.4* 3.5 3.6   *  --  119*  --  118*   CO2 17*  --  21*  --  19*   BUN 17  --  17  --  16   CREATININE 1.6*  --  1.7*  --  1.7*   CALCIUM 9.2  --  10.7*  --  10.8*    < > = values in this interval not displayed. Recent Labs     10/17/20  0425 10/18/20  0535 10/19/20  0641   AST 13 15 16   ALT 17 17 16   BILITOT 0.3 0.4 0.4   ALKPHOS 75 81 81     No results for input(s): INR in the last 72 hours. No results for input(s): Grant Clap in the last 72 hours.     Microbiology:    Blood culture #1:   Lab Results   Component Value Date    BC No growth-preliminary No growth  09/24/2020       Blood culture #2:No results found for: BLOODCULT2    Organism:  Lab Results   Component Value Date    [facility] Staphylococcus aureus 10/12/2020         Lab Results   Component Value Date    LABGRAM  10/12/2020     Moderate segmented neutrophils observed. Rare epithelial cells observed. Rare gram positive cocci occurring singly and in pairs. MRSA culture only:No results found for: [facility]    Urine culture:   Lab Results   Component Value Date    Verito Scripture  10/06/2020     At least one of drugs in current antibiotic therapy is ineffective in vitro for isolate. LABURIN [place] count: >100,000 CFU/mL   10/06/2020       Respiratory culture: No results found for: CULTRESP    Aerobic and Anaerobic :  No results found for: LABAERO  No results found for: LABANAE    Urinalysis:      Lab Results   Component Value Date    NITRU NEGATIVE 10/06/2020    WBCUA > 200 10/06/2020    BACTERIA FEW 10/06/2020    RBCUA 5-10 10/06/2020    BLOODU LARGE 10/06/2020    SPECGRAV >=1.030 10/06/2020    GLUCOSEU NEGATIVE 08/05/2020       Radiology:  XR CHEST PORTABLE   Final Result   Minimal residual atelectasis and/or infiltrate within the bilateral mid    and lower lungs with improvement. Improved pulmonary inflation. This document has been electronically signed by: Leesa Vásquez MD on    10/16/2020 02:02 AM         XR CHEST PORTABLE   Final Result   Impression:   Progressive bilateral consolidations or ARDS. This document has been electronically signed by: Wil Leach MD on    10/12/2020 04:59 AM         XR CHEST PORTABLE   Final Result    Similar bilateral ill-defined pneumonia. This document has been electronically signed by: Zack Danielle DO on    10/11/2020 09:49 AM         XR CHEST PORTABLE   Final Result   Similar diffuse patchy bilateral airspace disease and consolidations. Support devices as above. This document has been electronically signed by: Gaviota Cao MD on    10/10/2020 04:35 AM         XR CHEST PORTABLE   Final Result   No acute findings.       This document has been electronically signed by: Reno Bocanegra MD on 10/06/2020 06:09 AM         XR CHEST PORTABLE   Final Result   Slightly decreased diffuse patchy bilateral airspace disease. Support devices as above. This document has been electronically signed by: Connie Marques MD on    10/03/2020 05:15 AM         XR CHEST PORTABLE   Final Result   ET tube should be retracted 1.5-2.0 cm. No significant change in coarse scattered bilateral infiltrates. This document has been electronically signed by: Siddharth Baker MD on    09/30/2020 06:50 AM         XR CHEST PORTABLE   Final Result      Slightly improving bilateral pneumonia. **This report has been created using voice recognition software. It may contain minor errors which are inherent in voice recognition technology. **      Final report electronically signed by Dr. Benson Davis on 9/27/2020 2:23 PM      XR ABDOMEN FOR NG/OG/NE TUBE PLACEMENT   Final Result   Esophageal route tube tip in the stomach. **This report has been created using voice recognition software. It may contain minor errors which are inherent in voice recognition technology. **      Final report electronically signed by Dr Yumi Castaneda on 9/26/2020 10:22 AM      XR CHEST PORTABLE   Final Result   1. Lines and tubes as above. 2. Worsening bilateral pneumonia. **This report has been created using voice recognition software. It may contain minor errors which are inherent in voice recognition technology. **      Final report electronically signed by Dr. Benson Davis on 9/24/2020 7:38 AM      XR CHEST PORTABLE   Final Result   1. Endotracheal tube terminates 3.1 cm above the madi. 2.  Orogastric tube in the stomach. 3.  Patchy opacities in the right upper lobe and lingula. This document has been electronically signed by: Barbara Faulkner MD on    09/24/2020 12:35 AM         XR CHEST PORTABLE   Final Result   1.  Bilateral pulmonary opacification worse than on previous study dated 10 September 2020. This may represent worsening inflammatory process, possibly Covid 19 infection. Please correlate clinically   2. Borderline cardiomegaly. **This report has been created using voice recognition software. It may contain minor errors which are inherent in voice recognition technology. **      Final report electronically signed by DR Domenica Pineda on 9/23/2020 10:47 AM        Xr Chest Portable    Result Date: 9/24/2020  PROCEDURE: XR CHEST PORTABLE CLINICAL INFORMATION: Left internal jugular central venous catheter placement TECHNIQUE: Mobile AP chest radiograph. COMPARISON: Mobile AP chest radiograph 9/23/2020 FINDINGS: The tip of a new left-sided internal jugular central venous line overlies the superior vena cava. Endotracheal tube, nasogastric tube and esophageal probe are in stable position. Cardiomediastinal silhouette is within normal limits. Lung volumes are low. Alveolar opacities throughout the bilateral lungs are more extensive than the prior study. Degenerative changes in the thoracic spine are poorly visualized. 1. Lines and tubes as above. 2. Worsening bilateral pneumonia. **This report has been created using voice recognition software. It may contain minor errors which are inherent in voice recognition technology. ** Final report electronically signed by Dr. Leticia Triplett on 9/24/2020 7:38 AM    Xr Chest Portable    Result Date: 9/23/2020  XR CHEST PORTABLE Exam Date and Exam Time: 09/23/2020 11:42 PM Accession: [id number] Reason for exam: ett/ng Ordering Diagnosis: Acute respiratory failure with hypoxemia ([address] and Acute respiratory failure with hypoxia ([address] 1 view chest x-ray Comparison:  DX  - CHEST PA /T/ LAT  - 11/09/2011 11:43 PM EST Findings: Endotracheal tube terminates 3.1 cm above the madi. Orogastric tube seen in the region of the stomach. Patchy opacities in the right upper lobe and lingula. No pleural effusion or pneumothorax.  Heart size is normal. No pulmonary vascular congestion. No acute fracture. 1.  Endotracheal tube terminates 3.1 cm above the madi. 2.  Orogastric tube in the stomach. 3.  Patchy opacities in the right upper lobe and lingula. This document has been electronically signed by: Ivey Collet, MD on 09/24/2020 12:35 AM     Xr Chest Portable    Result Date: 9/23/2020  PROCEDURE: XR CHEST PORTABLE CLINICAL INFORMATION: sob. COMPARISON: Chest x-ray dated 10 September 2020 TECHNIQUE: AP upright view of the chest. FINDINGS: There is extensive bilateral pulmonary opacification, worse than on previous study dated 10 September 2020. These findings may represent inflammatory process. There are no effusions. There is borderline cardiomegaly. 1. Bilateral pulmonary opacification worse than on previous study dated 10 September 2020. This may represent worsening inflammatory process, possibly Covid 19 infection. Please correlate clinically 2. Borderline cardiomegaly. **This report has been created using voice recognition software. It may contain minor errors which are inherent in voice recognition technology. ** Final report electronically signed by DR Jaleesa Santizo on 9/23/2020 10:47 AM      Electronically signed by Jaden Casillas MD on 10/19/2020 at 12:37 PM

## 2020-10-19 NOTE — CARE COORDINATION
10/19/20, 1:29 PM EDT    DISCHARGE ONGOING EVALUATION:     Koreychris Ilene day: 26  Location: [facility]06/006-A Reason for admit: Acute respiratory failure with hypoxemia ([facility] [address] [J96.01]  Acute respiratory failure with hypoxia ([facility] [address] [address] [J96.01]  Pneumonia due to COVID-19 virus [U07.1, J12.89]   Treatment Plan of Care: Tmax 99.7, O2 at 2 liters with sat at 94%. PT/OT, electrolyte replacement protocols, diabetes management, IV Protonix. Barriers to Discharge: medical stability  PCP: Espinoza Jay MD  Readmission Risk Score: 48%  Patient Goals/Plan/Treatment Preferences: Plan is to home with [facility] vs TCU.

## 2020-10-19 NOTE — PLAN OF CARE
Problem: Impaired respiratory status  Goal: Patient will achieve/maintain normal respiratory rate/effort  Outcome: Ongoing  Note: Patient currently on BiPAP 22/6 and 21%     Problem: Impaired respiratory status  Goal: Clear lung sounds  Outcome: Ongoing

## 2020-10-20 ENCOUNTER — APPOINTMENT (OUTPATIENT)
Dept: CT IMAGING | Age: 50
DRG: 870 | End: 2020-10-20
Payer: MEDICARE

## 2020-10-20 ENCOUNTER — APPOINTMENT (OUTPATIENT)
Dept: GENERAL RADIOLOGY | Age: 50
DRG: 870 | End: 2020-10-20
Payer: MEDICARE

## 2020-10-20 LAB
ALBUMIN SERPL-MCNC: 2.7 G/DL (ref 3.5–5.1)
ALP BLD-CCNC: 78 U/L (ref 38–126)
ALT SERPL-CCNC: 12 U/L (ref 11–66)
ANION GAP SERPL CALCULATED.3IONS-SCNC: 13 MEQ/L (ref 8–16)
AST SERPL-CCNC: 13 U/L (ref 5–40)
BASOPHILS # BLD: 0.6 %
BASOPHILS ABSOLUTE: 0.1 THOU/MM3 (ref 0–0.1)
BILIRUB SERPL-MCNC: 0.4 MG/DL (ref 0.3–1.2)
BUN BLDV-MCNC: 15 MG/DL (ref 7–22)
CALCIUM SERPL-MCNC: 10.5 MG/DL (ref 8.5–10.5)
CHLORIDE BLD-SCNC: 115 MEQ/L (ref 98–111)
CO2: 20 MEQ/L (ref 23–33)
CREAT SERPL-MCNC: 1.8 MG/DL (ref 0.4–1.2)
EOSINOPHIL # BLD: 3.2 %
EOSINOPHILS ABSOLUTE: 0.3 THOU/MM3 (ref 0–0.4)
ERYTHROCYTE [DISTWIDTH] IN BLOOD BY AUTOMATED COUNT: 18.4 % (ref 11.5–14.5)
ERYTHROCYTE [DISTWIDTH] IN BLOOD BY AUTOMATED COUNT: 60.3 FL (ref 35–45)
GFR SERPL CREATININE-BSD FRML MDRD: 48 ML/MIN/1.73M2
GLUCOSE BLD-MCNC: 136 MG/DL (ref 70–108)
GLUCOSE BLD-MCNC: 137 MG/DL (ref 70–108)
GLUCOSE BLD-MCNC: 138 MG/DL (ref 70–108)
GLUCOSE BLD-MCNC: 141 MG/DL (ref 70–108)
GLUCOSE BLD-MCNC: 142 MG/DL (ref 70–108)
HCT VFR BLD CALC: 25.2 % (ref 42–52)
HEMOGLOBIN: 7.6 GM/DL (ref 14–18)
IMMATURE GRANS (ABS): 0.11 THOU/MM3 (ref 0–0.07)
IMMATURE GRANULOCYTES: 1.1 %
LYMPHOCYTES # BLD: 10.7 %
LYMPHOCYTES ABSOLUTE: 1 THOU/MM3 (ref 1–4.8)
MCH RBC QN AUTO: 28 PG (ref 26–33)
MCHC RBC AUTO-ENTMCNC: 30.2 GM/DL (ref 32.2–35.5)
MCV RBC AUTO: 93 FL (ref 80–94)
MONOCYTES # BLD: 7.1 %
MONOCYTES ABSOLUTE: 0.7 THOU/MM3 (ref 0.4–1.3)
NUCLEATED RED BLOOD CELLS: 0 /100 WBC
PLATELET # BLD: 495 THOU/MM3 (ref 130–400)
PMV BLD AUTO: 9.7 FL (ref 9.4–12.4)
POTASSIUM SERPL-SCNC: 3.2 MEQ/L (ref 3.5–5.2)
RBC # BLD: 2.71 MILL/MM3 (ref 4.7–6.1)
SEG NEUTROPHILS: 77.3 %
SEGMENTED NEUTROPHILS ABSOLUTE COUNT: 7.5 THOU/MM3 (ref 1.8–7.7)
SODIUM BLD-SCNC: 148 MEQ/L (ref 135–145)
TOTAL PROTEIN: 6.3 G/DL (ref 6.1–8)
WBC # BLD: 9.7 THOU/MM3 (ref 4.8–10.8)

## 2020-10-20 PROCEDURE — 71045 X-RAY EXAM CHEST 1 VIEW: CPT

## 2020-10-20 PROCEDURE — 94761 N-INVAS EAR/PLS OXIMETRY MLT: CPT

## 2020-10-20 PROCEDURE — 51702 INSERT TEMP BLADDER CATH: CPT

## 2020-10-20 PROCEDURE — C9113 INJ PANTOPRAZOLE SODIUM, VIA: HCPCS | Performed by: INTERNAL MEDICINE

## 2020-10-20 PROCEDURE — 97163 PT EVAL HIGH COMPLEX 45 MIN: CPT

## 2020-10-20 PROCEDURE — 92526 ORAL FUNCTION THERAPY: CPT

## 2020-10-20 PROCEDURE — 6370000000 HC RX 637 (ALT 250 FOR IP): Performed by: NURSE PRACTITIONER

## 2020-10-20 PROCEDURE — 2580000003 HC RX 258: Performed by: NURSE PRACTITIONER

## 2020-10-20 PROCEDURE — 6360000002 HC RX W HCPCS: Performed by: NURSE PRACTITIONER

## 2020-10-20 PROCEDURE — 85025 COMPLETE CBC W/AUTO DIFF WBC: CPT

## 2020-10-20 PROCEDURE — 94640 AIRWAY INHALATION TREATMENT: CPT

## 2020-10-20 PROCEDURE — 2580000003 HC RX 258: Performed by: INTERNAL MEDICINE

## 2020-10-20 PROCEDURE — 80053 COMPREHEN METABOLIC PANEL: CPT

## 2020-10-20 PROCEDURE — 99232 SBSQ HOSP IP/OBS MODERATE 35: CPT | Performed by: INTERNAL MEDICINE

## 2020-10-20 PROCEDURE — 36415 COLL VENOUS BLD VENIPUNCTURE: CPT

## 2020-10-20 PROCEDURE — 6360000002 HC RX W HCPCS: Performed by: INTERNAL MEDICINE

## 2020-10-20 PROCEDURE — 74176 CT ABD & PELVIS W/O CONTRAST: CPT

## 2020-10-20 PROCEDURE — 1200000003 HC TELEMETRY R&B

## 2020-10-20 PROCEDURE — 97110 THERAPEUTIC EXERCISES: CPT

## 2020-10-20 PROCEDURE — 97530 THERAPEUTIC ACTIVITIES: CPT

## 2020-10-20 PROCEDURE — 82948 REAGENT STRIP/BLOOD GLUCOSE: CPT

## 2020-10-20 PROCEDURE — 70450 CT HEAD/BRAIN W/O DYE: CPT

## 2020-10-20 PROCEDURE — 99233 SBSQ HOSP IP/OBS HIGH 50: CPT | Performed by: INTERNAL MEDICINE

## 2020-10-20 RX ADMIN — SODIUM CHLORIDE, PRESERVATIVE FREE 10 ML: 5 INJECTION INTRAVENOUS at 09:00

## 2020-10-20 RX ADMIN — GLYCOPYRROLATE AND FORMOTEROL FUMARATE 2 PUFF: 9; 4.8 AEROSOL, METERED RESPIRATORY (INHALATION) at 16:07

## 2020-10-20 RX ADMIN — ENOXAPARIN SODIUM 40 MG: 40 INJECTION SUBCUTANEOUS at 20:00

## 2020-10-20 RX ADMIN — PANTOPRAZOLE SODIUM 40 MG: 40 INJECTION, POWDER, FOR SOLUTION INTRAVENOUS at 07:46

## 2020-10-20 RX ADMIN — POTASSIUM CHLORIDE: 2 INJECTION, SOLUTION, CONCENTRATE INTRAVENOUS at 07:46

## 2020-10-20 RX ADMIN — POTASSIUM CHLORIDE 20 MEQ: 400 INJECTION, SOLUTION INTRAVENOUS at 07:25

## 2020-10-20 RX ADMIN — SODIUM CHLORIDE, PRESERVATIVE FREE 10 ML: 5 INJECTION INTRAVENOUS at 20:00

## 2020-10-20 RX ADMIN — POTASSIUM CHLORIDE: 2 INJECTION, SOLUTION, CONCENTRATE INTRAVENOUS at 12:37

## 2020-10-20 ASSESSMENT — PAIN SCALES - GENERAL
PAINLEVEL_OUTOF10: 0

## 2020-10-20 ASSESSMENT — PAIN SCALES - WONG BAKER
WONGBAKER_NUMERICALRESPONSE: 4
WONGBAKER_NUMERICALRESPONSE: 0

## 2020-10-20 NOTE — PROGRESS NOTES
Pt has had many bloody stools with large clots noted. He has been able to follow commands but is very weak. Remains in sinus rhythm.  Pt has been on room air since 1100 this am.

## 2020-10-20 NOTE — PROGRESS NOTES
[address] [id number]  INPATIENT PHYSICAL THERAPY  EVALUATION  [facility] RENAL TELEMETRY 6K - 6K-25/025-A    Time In:   Time Out: 253  Timed Code Treatment Minutes: 25 Minutes  Minutes: 39          Date: 10/20/2020  Patient Name: Yessica Rios,  Gender:  male        MRN: [id number]  : 1970  (44 y.o.)      Referring Practitioner: Shanae Allen. Amina Herrera MD  Diagnosis: acute respiratory failure with hypoxemia  Additional Pertinent Hx: Pt is a 43 yo male with a history of morbid obesity, DM type II, ETOH abuse, hyperlipidemia, KIARA, and non-alcoholic steatohepatitis. He has been in the hospital since 2020 with admitting diagnosis of COVID-19. With this diagnosis he also had bacterial infiltrates. Pt has been intubated and extubated twice during this admission, and his current diagnosis is acute respiratory failure with hypoxemia. He is now COVID negative. Restrictions/Precautions:  Restrictions/Precautions: General Precautions    Subjective:  Chart Reviewed: Yes  Patient assessed for rehabilitation services?: Yes  Family / Caregiver Present: No  Other (Comment): ~10-20% command following; it appears pt understands commands but is unable to follow them  Subjective: RN approved session. Pt up in bed upon PT arrival. Pt was initially difficult to wake, but woke up with being called by his name and having his hand squeezed. He became more awake throughout the session, eventually being able to answer a few questions. His speech was difficult to understand throughout the session, though a few words came in clearly. Noted that pt had bowel movement. Nurse was alerted and she came in during session to perform bowel hygeine with PT assist.    General:  Follows Commands: Impaired - follows 10-20% of commands, becoming more oriented/alert throughout session       Pain: pt had pain with bending L knee, pain with rolling for hygiene.  Unable to identify location of pain    Social/Functional History:    Lives With: certain movements, ie : knee flexion bilaterally. Acknowledged pt is likely in pain and asked if he could point or say where, asked if it was in knee, but unable to get response. Pt's respirations increased during TherEx to ~35bpm; had a rest break and they returned to normal. Pt's RR decreased  (~7-8bpms) while in supine for bowel hygeine; raised HOB to 30 degrees to encourage increase in RR and cued for deep breaths. After ~30 sec, RR returned to normal.       Treatment Initiated: Treatment and education initiated within context of evaluation. Evaluation time included review of current medical information, gathering information related to past medical, social and functional history, completion of standardized testing, formal and informal observation of tasks, assessment of data and development of plan of care and goals. Treatment time included skilled education and facilitation of tasks to increase safety and independence with functional mobility for improved independence and quality of life. Assessment: Body structures, Functions, Activity limitations: Decreased functional mobility , Decreased strength, Decreased endurance, Decreased posture, Decreased ADL status, Decreased high-level IADLs, Decreased ROM, Decreased cognition, Decreased balance, Increased pain  Assessment: Pt is likely far below PLOF. Unknown what PLOF was before hospital admission, but pt has severe deficits associated with a long-term hospital stay. Pt is severely debilitated and requires continued physical therapy long-term to return to PLOF. Prognosis: Fair    REQUIRES PT FOLLOW UP: Yes    Discharge Recommendations:  Discharge Recommendations: [address], Continue to assess pending progress   Pt will require continued skilled physical therapy, and will require continued care for self care/ADLs. Pt is severely debilitated at this time. NOT safe for return home in the near future.      Patient Education:  PT Education: Goals, PT Role, Plan of Care, Orientation    Equipment Recommendations:  Equipment Needed: (unknown at this time; will likely need some type of DME)    Plan:  Times per week: 5x GM  Times per day: Daily  Specific instructions for Next Treatment: co-tx with OT; attempt EOB sitting so pt can improve balance and postural awareness and progress to more advanced activity  Current Treatment Recommendations: Strengthening, Functional Mobility Training, ROM, Transfer Training, Balance Training, ADL/Self-care Training, Endurance Training, Patient/Caregiver Education & Training, Positioning, Safety Education & Training, Cognitive/Perceptual Training, Neuromuscular Re-education    Goals:  Patient goals : discharge from hospital  Short term goals  Time Frame for Short term goals: by hospital discharge  Short term goal 1: Roll to L and to R with Min Ax 1 for ease of bed mobility  Short term goal 2: PT to assess supine to sit transfer, and transfers beyond this when able  Long term goals  Time Frame for Long term goals : N/A due to short ELOS    Following session, patient left in safe position with all fall risk precautions in place and call light in reach.      Silvino Ortiz, PT, DPT

## 2020-10-20 NOTE — PROGRESS NOTES
[facility]  INPATIENT SPEECH THERAPY  STRZ RENAL TELEMETRY 6K  DAILY NOTE    TIME   SLP Individual Minutes  Time In: 0666  Time Out: 4561  Minutes: 8  Timed Code Treatment Minutes: 0 Minutes       Date: 10/20/2020  Patient Name: Rush Narayan      CSN: [id number]   : 1970  (44 y.o.)  Gender: male   Referring Physician: Marie Lovell MD  Diagnosis: Acute respiratory failure with hypoxemia   Secondary Diagnosis: Dysphagia   Precautions: Fall risk, aspiration precautions   Current Diet: NPO  Swallowing Strategies: Strict oral care  Date of Last MBS: Not Applicable    Pain:  No pain reported. Subjective:  Pt seen upright in bed following session with PT. Pt with fatigue, decreased verbalizations and concerns for confused language. Pt with hoarse/rough vocal quality. Hx of multiple intubations/extubations. High concerns for decreased airway protection with inability to r/o silent aspiration with clinical swallowing assessment alone. Recommendations for follow up instrumental assessment. Short-Term Goals:  SHORT TERM GOAL #1:  Goal 1: Pt will safely consume PO trials of thin liquids and purees  (and advanced texture trials as deemed clinically indicated) demonstrating consistent pharyngeal swallow trigger and endurance to determine readiness for potential PO diet level initiation. INTERVENTIONS: PO trials of applesauce x5, thin liquids by straw x3. Pt with mildly decreased bolus formation with at least trace oral stasis noted. Slow AP transit. Pt with decreased oral opening and poor ability to fully visualize oral structures and r/o further stasis on lingual dorsum and/or presence of potential pocketing. Pt with what appeared to be a delayed swallow. Laryngeal elevation felt to be adequate; however, concerns for pharyngeal stasis as pt with occasional presence of spontaneous multiple swallows. Increased respiration and concerns for SOB s/p consumption of thin liquids by straw.  Pt with no overt s/s of aspiration; however, cannot r/o silent aspiration. Pt at heightened risks for airway invasion events given complex medical course, ongoing debility as well as multiple intubations/extubations. Recommendations to pt to remain NPO with completion of FEES (Fiberoptic Endoscopic Evaluation of Swallowing) to be completed tomorrow, 10/21. SHORT TERM GOAL #2:  Goal 2: Staff will exhibit return demonstration for implementation of comprehensive oral care procedural analysis and administration of ice chips post oral care to maximize oral integrity and preserve swallow mechanism. INTERVENTIONS: DNT d/t focus on other goals     SHORT TERM GOAL #3:  Goal 3: Monitor pulmonary status and readiness for potential formal instrumentation; complete MBS/FEES should it become clinically indicated. INTERVENTIONS: Plan FEES tomorrow, 10/21. SHORT TERM GOAL #4:   Goal 4: Complete full ST assessment and add goal as indicated. INTERVENTIONS: Goal initiated. Long-Term Goals:   No established LTG's given short ELOS        EDUCATION:  Learner: Patient  Education:  Reviewed results and recommendations of this evaluation and Reviewed ST goals and Plan of Care  Evaluation of Education: Needs further instruction, No evidence of learning and Family not present    ASSESSMENT/PLAN:  Activity Tolerance:  Patient tolerance of  treatment: fair. Assessment/Plan: Patient progressing toward established goals. Continues to require skilled care of licensed speech pathologist to progress toward achievement of established goals and plan of care. .     Plan for Next Session: ARINA Steward 10/20/2020

## 2020-10-20 NOTE — CARE COORDINATION
10/20/20, 11:11 AM EDT    DISCHARGE ON GOING EVALUATION    Vic Hernandez day: 27  Location: -06/006-A Reason for admit: Acute respiratory failure with hypoxemia ([address] [J96.01]  Acute respiratory failure with hypoxia ([address] [J96.01]  Pneumonia due to COVID-19 virus [U07.1, J12.89]   Procedure: : 9/23 Intubated  9/24 CVC left subclavian - 9/30 removed  1/55 PICC right basilic  14/6 Extubated to bipap  10/6 Reintubated  10/15 extubated   10/20 CT Abdomen Pelvis   Impression:         1. Almost complete resolution of previously seen airspace infiltrates in the lung bases. 2. No acute abdominal or pelvic abnormalities. 10/20  CT Head  WO Contrast    Impression:          No evidence of acute intracranial abnormality. 10/20 CXR   Impression:          Ill-defined bilateral lung opacities. Follow-up as clinically indicated. Treatment Plan of Care: Tmax 99.2, 99% O2 saturation on room air. PT/OT, Dietitian consult, albuterol nebs, electrolyte replacement protocols, diabetes management, IV Protonix, GI consult, PICC . Barriers to Discharge: medical stability. PCP: Damion Lazo MD  Readmission Risk Score: 49%  Patient Goals/Plan/Treatment Preferences: from home with [place]. Has been at [facility]. Will most likely need [facility] for rehabilitation. SW following. Refer to SW note.

## 2020-10-20 NOTE — CARE COORDINATION
10/20/20, 10:27 AM EDT    DISCHARGE PLANNING EVALUATION    Pt extubated yesterday and is on room air today. SW met with pt to discuss discharge plan, pt alert however, had some difficulty answering some questions. Pt could not tell sw what he wanted to do when medically ready for discharge. SW will speak with [facility] where pt was recently x 1 yr. To check on bed status. DASIA spoke with Zach Arana with [facility], states [facility] mentioned early in this admission that they would take pt back if pt chooses. Zach Arana will call [facility] to check bed status.

## 2020-10-20 NOTE — PROGRESS NOTES
Comprehensive Nutrition Assessment    Type and Reason for Visit:  Reassess, Consult(consult - poor intake)    Nutrition Recommendations/Plan:   Start parenteral nutrition: dose weight 88 kg, start at 10 kcals/kg, 1.5 grams protein/kg, 30% lipids  When able to use gut recommend EN vs oral diet as per SLP recommendations    Nutrition Assessment:  Pt. declining from a nutritional standpoint AEB remains NPO. Remains at risk for further nutritional compromise r/t admitted with acute respiratory failure with hypoxemia, TF stopped on 10/15 when extubated and OGT removed, patient failed swallow eval on 10/16, patient with GI bleed (large clots and ulceration at site of rectum), morbidly obese, and underlying medical condition (hx: CAD, DM, GERD, Alcohol abuse, HLD, HTN, Vitamin D deficiency). Nutrition recommendations/interventions as per above. Malnutrition Assessment:  Malnutrition Status: At risk for malnutrition (Comment)(no nutrition in ~5 days)    Context:  Acute Illness     Findings of the 6 clinical characteristics of malnutrition:  Energy Intake:  7 - 50% or less of estimated energy requirements for 5 or more days  Weight Loss:  Unable to assess(weight fluctuations with edema)     Body Fat Loss:  No significant body fat loss     Muscle Mass Loss:  No significant muscle mass loss    Fluid Accumulation:  (+1 and +2 edema) Extremities   Strength:  Not Performed    Estimated Daily Nutrient Needs:  Energy (kcal):  9301-9292 (30-32 kcals/kg IBW in active late phase); Weight Used for Energy Requirements:  Ideal(70 kg - ideal weight)     Protein (g):  ~140 gms (2/kgm IBW) as renal status allows; Weight Used for Protein Requirements:  Ideal(70 kg)        Fluid (ml/day):  per MD; Weight Used for Fluid Requirements:  (n/a)      Nutrition Related Findings:  +covid; extubated 10/15; OGT removed; MAP: 106. SLP evaluated patient on 10/16 and recommend NPO. Patient has not had any nutrition since 10/15.  Spoke to EDT    Contact: [phone]

## 2020-10-20 NOTE — PROGRESS NOTES
Call to Dr. Karen Shirley re: CT abd and head. Orders to hold off on tube feeding until Speech repeats eval today. Message left at Speech dept to return call.

## 2020-10-20 NOTE — PLAN OF CARE
Problem: Nutrition  Goal: Optimal nutrition therapy  10/20/2020 1045 by Johnny Quijano RD, LD  Outcome: Ongoing   Nutrition Problem #1: Inadequate oral intake  Intervention: Food and/or Nutrient Delivery: Continue NPO(recommend TPN for nutrition support)  Nutritional Goals: Patient will receive adequate nutrition in 1-4 days

## 2020-10-20 NOTE — PROGRESS NOTES
(!) 310 lb (140.6 kg)   09/15/20 281 lb 6.4 oz (127.6 kg)   08/21/20 (!) 306 lb 6.4 oz (139 kg)           Physical Exam : Due to COVID-19 situation termination is limited exam from outside the room  General Appearance: Obese well-nourished no distress  CNS-confused but some walking slightly more communicative  Psych-not agitated  Abdomen: Appears slightly distended  Musculoskeletal:  Edema -no edema           Last 3 CBC   Recent Labs     10/18/20  0535  10/19/20  0029 10/19/20  0641 10/20/20  0352   WBC 9.7  --   --  9.5 9.7   RBC 2.89*  --   --  2.91* 2.71*   HGB 7.9*   < > 8.0* 8.1* 7.6*   HCT 26.6*   < > 25.8* 26.9* 25.2*   *  --   --  540* 495*    < > = values in this interval not displayed. Last 3 CMP  Recent Labs     10/18/20  0535 10/18/20  1518 10/19/20  0641 10/20/20  0352   * 151* 150* 148*   K 3.4* 3.5 3.6 3.2*   *  --  118* 115*   CO2 21*  --  19* 20*   BUN 17  --  16 15   CREATININE 1.7*  --  1.7* 1.8*   CALCIUM 10.7*  --  10.8* 10.5   LABALBU 2.7*  --  2.9* 2.7*   BILITOT 0.4  --  0.4 0.4             ASSESSMENT / Plan   1 Renal -acute kidney injury most likely due to septic ATN/hypotension  ? Improving with some IV hydration . Currently at 1. 8.  May be the retention cause some renal dysfunction  ? Continue IV fluids and monitor renal function  ? Prn diuretics    2 Electrolytes -hypokalemia- being replaced. Will increase KCl to 40 mEq in the IV fluids  3 Hypernatremia-on hypotonic fluids will continue K9X  4 Metabolic acidosis stable  5 Anemia- S/P post rectal clot. Maintaining stable GI has been consulted  6 Hypotension resolved . 7 Hx of diabetes mellitus  8 Hypercalcemia trending higher-corrected calcium almost 11.5. PTH is appropriately low less than 1.2 vitamin D level is stable at 126 this is most likely hypercalcemia due to immobility if continues to rise might need to consider bisphosphonate.   9 Hx of COVID-19 pneumonia status post extubation  10 Hx of diastolic dysfunction volume status reasonable closely follow. As needed diuretics  11 Meds reviewed and discussed with the nursing staff     EMELY Moreira D.  [facility].

## 2020-10-20 NOTE — PLAN OF CARE
Bladder scan performed 0245 showing 617 mL. Dimas catheter replaced per [employer]	NISH Farley. 925 mL output noted right away. Problem: Nutrition  Goal: Optimal nutrition therapy  Outcome: Ongoing  Note: Patient NPO. Problem: Impaired respiratory status  Goal: Patient will achieve/maintain normal respiratory rate/effort  Outcome: Ongoing     Problem: Impaired respiratory status  Goal: Clear lung sounds  Outcome: Ongoing     Problem: Serum Glucose Level - Abnormal:  Goal: Ability to maintain appropriate glucose levels will improve  Description: Ability to maintain appropriate glucose levels will improve  Outcome: Ongoing  Note: Patient NPO. No insulin coverage provided. Care plan reviewed with patient.

## 2020-10-20 NOTE — FLOWSHEET NOTE
10/20/20 0247   Provider Notification   Reason for Communication Review case   Provider Name [facility]   Provider Notification [facility] Clinician (CNS, NP, CNM, CRNA, PA)   Method of Communication Secure Message   Response See orders   Notification Time [phone]     [phone] - Patient had mason catheter removed today right before shift change. Patient has had minimal to no output since. Bladder scan at this time shows 617 mL.    6283 - Insert mason catheter per [place], [place].    4720 - Mason catheter inserted. 925 output.

## 2020-10-20 NOTE — PROGRESS NOTES
Hospitalist Progress Note      Patient:  Don Colin    Unit/Bed:4B-06/006-A  YOB: 1970  MRN: [id number]   Acct: [de-identified]   PCP: Tre Ramírez MD  Date of Admission: 9/23/2020    Assessment/Plan:    1. Sepsis due to COVID pneumonia - POA   - Completed course of dissipating Decadron on previous admission. Had to be intubated and extubated twice. Patient has been on multiple different antibiotics for pneumonia. Sputum grew MRSA. Has not been able to receive Zyvox. Per ICU notes patient should be on Zyvox. Patient sputum culture grew staph on 10/12/2020, and the sensitivities showed resistance to Doxy. Doxy was stopped on 10/16/2020. Zyvox was initiated on 10/16/2020 to be completed for a total of 8 days. Patient has not received any Zyvox so far as confirmed with pharmacist as he has not been seen by ST for swallow eval.    10/20: Patient has not received any Zyvox. Likely doesn't need it as he is asymptomatic. No fevers. Off O2 on RA. Has progressed well. Will hold off any further antibiotic use unless patient becomes febrile or has worsening SOB. 2.  Acute hypoxic respiratory failure due to Covid, pneumonia, KIARA   -Continue to wean down oxygen as tolerated. BiPAP during day. 10/20: Off O2, on RA, Sat 100%. 3. KRISTY due to hypotension, sepsis - stable   -Continue to monitor closely. Avoid nephrotoxins. 10/20: Renal function stable    4. Hyponatremia   -D5W. Continue to monitor sodium levels closely. Nephrology is following. Appreciate recommendations. 10/20: On D5W. Nephrology managing. 5. Anemia -dilutional and iron deficiency . - stable   -Continue to monitor. 10/20: Patient had several episodes of blood per rectum. GI was asked to evaluate the patient and they rec CT abd. Likely from rectal ulcer at site of rectal tube. GI ok with restarting lovenox. CCM.      6. KIARA/OHS    -has not been compliant with his CPAP machine. Continue stimulants during the daytime. 7. DM II   -Blood glucose much better controlled now. Continue Accu-Cheks 3 times daily and insulin as prescribed. 10/20: Patients insulin held as he has not received much nutrition in last several days. BG is stable. 8.  Essential hypertension -blood pressure controlled   -Continue medications for now. If blood pressure trends up will restart other home medications. 9. Malnutrition/dysphagia   - ST to reevaluate the patient and see if he passes swallow eval as his mentation and status has improved. If not, will start patient on TPN. Dietician was consulted. 10. Deconditioning   - Will transfer patient out of COVID floor since he has had two negative COVIDs in past after first COVID positive a month ago. Will have PT/OT work with patient and see if his weakness improved. CT head negative. Likely due to physical deconditioning due to prolong hospitalization and bed bound status compounded by obesity and COVID ?lingering fatigue. Chief Complaint: Shortness of breath    Initial H and P:-       47-year-old morbidly obese black male lifetime non-smoker. Aspen Vela has a history of morbid obesity associated with type 2 diabetes mellitus, prior alcohol abuse, hyperlipidemia, hypertension, hypogonadism, nonalcoholic steatohepatitis, obstructive sleep apnea, and gout.  Patient was hospitalized 9/6/2020 through 9/15/2020 with hypoxemic respiratory failure secondary to COVID-19 associated with diffuse bilateral infiltrates.  At that time, patient received Decadron, remdesivir, and danazol.  During hospitalization, he had issues with atrial fibrillation.  His insulin therapy required adjustment.  He was discharged home on subcutaneous Lovenox. Patient returned back to the emergency room on 9/23/2020 with increasing SOB and progressive hypoxia. CXR showed diffuse infiltrates.  Deteriorated and required intubation.  Patient underwent bronchoscopy on 9/27/2020 which demonstrated no mucus production. Patient extubated 10/2/2020. Patient reintubated for progressive respiratory failure with progressive obtundation on 10/6/2020.  This was associated with acute oliguric renal failure.  Patient was intubated 10/6/2020, and underwent volume resuscitation.  Fractional excretion of sodium returned less than 1 which was consistent with prerenal azotemia.  After volume resuscitation, patient demonstrated that he was hemoconcentrated with a drop of 2 g in the hemoglobin. With volume resuscitation, urine output did improve. After patient was intubated on 10/6/2020, he underwent pulmonary lavage which showed MRSA on the PCR but no other organism.  However, patient was found to have greater than 200 white blood cells in the urine.  Patient was treated with Zosyn and doxycycline. Previously, patient had received vancomycin and developed fever while on vancomycin. Therefore vancomycin was not continued.  Sputum subsequently grew staph aureus.  Zosyn was discontinued but doxycycline continued on 10/14/2020. Patient did well with spontaneous breathing trial and was extubated 10/15/2020. Subjective (past 24 hours):     Patient a lot more awake this morning. Following commands but feels really weak. No chest pain or SOB anymore. No issues overnight. Will transfer out of COVID unit      Past medical history, family history, social history and allergies reviewed again and is unchanged since admission. ROS (12 point review of systems completed. Pertinent positives noted.  Otherwise ROS is negative)     Medications:  Reviewed    Infusion Medications    IV infusion builder 80 mL/hr at 10/20/20 1237    dextrose       Scheduled Medications    hydrocortisone  25 mg Rectal BID    pantoprazole  40 mg Intravenous Daily    potassium chloride  40 mEq Oral Once    [Held by provider] gabapentin  400 mg Oral BID    [Held by provider] modafinil  100 mg Oral Daily    enoxaparin  40 mg Subcutaneous BID    insulin glargine  38 Units Subcutaneous Nightly    insulin lispro  0-12 Units Subcutaneous TID WC    insulin lispro  0-6 Units Subcutaneous Nightly    lidocaine 1 % injection  5 mL Intradermal Once    sodium chloride flush  10 mL Intravenous 2 times per day    magnesium replacement protocol   Other RX Placeholder    phosphorus replacement protocol   Other RX Placeholder    calcium replacement protocol   Other RX Placeholder    [Held by provider] ARIPiprazole  15 mg Oral Daily    aspirin  81 mg Oral Daily    glycopyrrolate-formoterol  2 puff Inhalation BID     PRN Meds: potassium chloride, sodium chloride flush, lidocaine, acetaminophen **OR** [DISCONTINUED] acetaminophen, polyethylene glycol, promethazine **OR** ondansetron, albuterol, glucose, dextrose, glucagon (rDNA), dextrose      Intake/Output Summary (Last 24 hours) at 10/20/2020 1312  Last data filed at 10/20/2020 1200  Gross per 24 hour   Intake 913.06 ml   Output 2975 ml   Net -2061.94 ml       Diet:  No diet orders on file    Exam:  /72   Pulse 92   Temp 99.2 °F (37.3 °C) (Axillary)   Resp 20   Ht 5' 8\" (1.727 m)   Wt (!) 310 lb (140.6 kg)   SpO2 98%   BMI 47.14 kg/m²   General appearance: No apparent distress, appears stated age and cooperative. Morbidly obese  HEENT: Pupils equal, round, and reactive to light. Conjunctivae/corneas clear. Neck: Supple, with full range of motion. No jugular venous distention. Trachea midline. Respiratory:  Decrease breath sounds b/l, limited exam due to body habitus  Cardiovascular: Regular rate and rhythm with normal S1/S2 without murmurs, rubs or gallops. Abdomen: Soft, non-tender, non-distended with normal bowel sounds. Musculoskeletal: b/l LE and UE 2/5 strength. Sensation intact. Skin: Skin color, texture, turgor normal.  No rashes or lesions. Neurologic:  Neurovascularly intact without any focal sensory/motor deficits.  Cranial nerves: II-XII intact, grossly non-focal.  Psychiatric: Alert and oriented, thought content appropriate, normal insight  Capillary Refill: Brisk,< 3 seconds   Peripheral Pulses: +2 palpable, equal bilaterally     Labs:   Recent Labs     10/18/20  0535  10/19/20  0029 10/19/20  0641 10/20/20  0352   WBC 9.7  --   --  9.5 9.7   HGB 7.9*   < > 8.0* 8.1* 7.6*   HCT 26.6*   < > 25.8* 26.9* 25.2*   *  --   --  540* 495*    < > = values in this interval not displayed. Recent Labs     10/18/20  0535 10/18/20  1518 10/19/20  0641 10/20/20  0352   * 151* 150* 148*   K 3.4* 3.5 3.6 3.2*   *  --  118* 115*   CO2 21*  --  19* 20*   BUN 17  --  16 15   CREATININE 1.7*  --  1.7* 1.8*   CALCIUM 10.7*  --  10.8* 10.5     Recent Labs     10/18/20  0535 10/19/20  0641 10/20/20  0352   AST 15 16 13   ALT 17 16 12   BILITOT 0.4 0.4 0.4   ALKPHOS 81 81 78     No results for input(s): INR in the last 72 hours. No results for input(s): Nicolas Hug in the last 72 hours. Microbiology:    Blood culture #1:   Lab Results   Component Value Date    BC No growth-preliminary No growth  09/24/2020       Blood culture #2:No results found for: Eber Pérez    Organism:  Lab Results   Component Value Date    ORG Staphylococcus aureus 10/12/2020         Lab Results   Component Value Date    LABGRAM  10/12/2020     Moderate segmented neutrophils observed. Rare epithelial cells observed. Rare gram positive cocci occurring singly and in pairs. MRSA culture only:No results found for: [facility]    Urine culture:   Lab Results   Component Value Date    Easter Beam  10/06/2020     At least one of drugs in current antibiotic therapy is ineffective in vitro for isolate.      LABURIN [place] count: >100,000 CFU/mL   10/06/2020       Respiratory culture: No results found for: CULTRESP    Aerobic and Anaerobic :  No results found for: LABAERO  No results found for: LABANAE    Urinalysis:      Lab Results   Component Value Date    NITRU NEGATIVE 10/06/2020 [address] > [address] 10/06/2020    BACTERIA FEW 10/06/2020    RBCUA 5-10 10/06/2020    BLOODU LARGE 10/06/2020    SPECGRAV >=1.030 10/06/2020    GLUCOSEU NEGATIVE 08/05/2020       Radiology:  CT ABDOMEN PELVIS WO CONTRAST Additional Contrast? None   Final Result   1. Almost complete resolution of previously seen airspace infiltrates in the lung bases. 2. No acute abdominal or pelvic abnormalities. **This report has been created using voice recognition software. It may contain minor errors which are inherent in voice recognition technology. **      Final report electronically signed by Dr. Carlos Stokes on 10/20/2020 10:50 AM      CT HEAD WO CONTRAST   Final Result    No evidence of acute intracranial abnormality. **This report has been created using voice recognition software. It may contain minor errors which are inherent in voice recognition technology. **      Final report electronically signed by Dr. Darrell Watkins MD on 10/20/2020 10:13 AM      XR CHEST PORTABLE   Final Result   Ill-defined bilateral lung opacities. Follow-up as clinically indicated. This document has been electronically signed by: Reynold Day MD on 10/20/2020 05:38 AM         XR CHEST PORTABLE   Final Result   Minimal residual atelectasis and/or infiltrate within the bilateral mid    and lower lungs with improvement. Improved pulmonary inflation. This document has been electronically signed by: Emily Patiño MD on    10/16/2020 02:02 AM         XR CHEST PORTABLE   Final Result   Impression:   Progressive bilateral consolidations or ARDS. This document has been electronically signed by: Kaushik Kraus MD on    10/12/2020 04:59 AM         XR CHEST PORTABLE   Final Result    Similar bilateral ill-defined pneumonia. This document has been electronically signed by: Daksha Olmedo DO on    10/11/2020 09:49 AM         XR CHEST PORTABLE   Final Result   Similar diffuse patchy bilateral airspace disease and consolidations. Support devices as above. This document has been electronically signed by: Hero Taylor MD on    10/10/2020 04:35 AM         XR CHEST PORTABLE   Final Result   No acute findings. This document has been electronically signed by: Ruben Lai MD on 10/08/2020 07:40 AM         CT ABDOMEN PELVIS WO CONTRAST Additional Contrast? Oral   Final Result    IMPRESSION:   Bilateral lower lobe pneumonia. Known Covid. Continued progress imaging is advised   Distended colon with fluid, air and stool. Correlate for diarrhea. **This report has been created using voice recognition software. It may contain minor errors which are inherent in voice recognition technology. **      Final report electronically signed by Dr. Jose Merritt on 10/7/2020 6:49 PM      XR CHEST PORTABLE   Final Result   Bilateral lung consolidation not significant change. This document has been electronically signed by: Ruben Lai MD on 10/07/2020 07:25 AM         US RENAL COMPLETE   Final Result   Unremarkable kidneys. This document has been electronically signed by: Adwoa Rausch MD on    10/07/2020 01:48 AM         XR CHEST PORTABLE   Final Result   1. There is a new endotracheal tube with the distal tip 1.8 cm above the level of the madi. 2. There is a new esophageal tube with the distal tip projecting over the gastric fundus. 3. There is a stable right PICC with the distal tip projecting over the right atrium. 4. The lung volumes are diminished. There are bilateral perihilar and the basilar opacities which are similar to the previous examination however may be accentuated by the expiratory technique. There is no pleural effusion. Follow-up chest radiographs    are recommended to confirm complete resolution. **This report has been created using voice recognition software.   It may contain minor errors which are inherent in voice recognition technology. **      Final report electronically signed by Dr. London Franklin on 10/6/2020 7:18 AM      XR CHEST PORTABLE   Final Result   Bilateral lung opacities. Follow-up to clearing recommended. This document has been electronically signed by: Khadra Barber MD on 10/06/2020 06:09 AM         XR CHEST PORTABLE   Final Result   Slightly decreased diffuse patchy bilateral airspace disease. Support devices as above. This document has been electronically signed by: Clive Yu MD on    10/03/2020 05:15 AM         XR CHEST PORTABLE   Final Result   ET tube should be retracted 1.5-2.0 cm. No significant change in coarse scattered bilateral infiltrates. This document has been electronically signed by: Julissa Roy MD on    09/30/2020 06:50 AM         XR CHEST PORTABLE   Final Result      Slightly improving bilateral pneumonia. **This report has been created using voice recognition software. It may contain minor errors which are inherent in voice recognition technology. **      Final report electronically signed by Dr. Belkis Agarwal on 9/27/2020 2:23 PM      XR ABDOMEN FOR NG/OG/NE TUBE PLACEMENT   Final Result   Esophageal route tube tip in the stomach. **This report has been created using voice recognition software. It may contain minor errors which are inherent in voice recognition technology. **      Final report electronically signed by Dr Brenda Pedersen on 9/26/2020 10:22 AM      XR CHEST PORTABLE   Final Result   1. Lines and tubes as above. 2. Worsening bilateral pneumonia. **This report has been created using voice recognition software. It may contain minor errors which are inherent in voice recognition technology. **      Final report electronically signed by Dr. Belkis Agarwal on 9/24/2020 7:38 AM      XR CHEST PORTABLE   Final Result   1. Endotracheal tube terminates 3.1 cm above the madi.    2.  Orogastric tube in the stomach. 3.  Patchy opacities in the right upper lobe and lingula. This document has been electronically signed by: Ata Hollis MD on    09/24/2020 12:35 AM         XR CHEST PORTABLE   Final Result   1. Bilateral pulmonary opacification worse than on previous study dated 10 September 2020. This may represent worsening inflammatory process, possibly Covid 19 infection. Please correlate clinically   2. Borderline cardiomegaly. **This report has been created using voice recognition software. It may contain minor errors which are inherent in voice recognition technology. **      Final report electronically signed by DR Mary Sinclair on 9/23/2020 10:47 AM        Xr Chest Portable    Result Date: 9/24/2020  PROCEDURE: XR CHEST PORTABLE CLINICAL INFORMATION: Left internal jugular central venous catheter placement TECHNIQUE: Mobile AP chest radiograph. COMPARISON: Mobile AP chest radiograph 9/23/2020 FINDINGS: The tip of a new left-sided internal jugular central venous line overlies the superior vena cava. Endotracheal tube, nasogastric tube and esophageal probe are in stable position. Cardiomediastinal silhouette is within normal limits. Lung volumes are low. Alveolar opacities throughout the bilateral lungs are more extensive than the prior study. Degenerative changes in the thoracic spine are poorly visualized. 1. Lines and tubes as above. 2. Worsening bilateral pneumonia. **This report has been created using voice recognition software. It may contain minor errors which are inherent in voice recognition technology. ** Final report electronically signed by Dr. Ila Epps on 9/24/2020 7:38 AM    Xr Chest Portable    Result Date: 9/23/2020  XR CHEST PORTABLE Exam Date and Exam Time: 09/23/2020 11:42 PM Accession: [id number] Reason for exam: ett/ng Ordering Diagnosis: Acute respiratory failure with hypoxemia ([address] and Acute respiratory failure with hypoxia ([address] 1 view chest x-ray Comparison:  DX  -

## 2020-10-21 PROBLEM — J96.00 ACUTE RESPIRATORY FAILURE DUE TO COVID-19 (HCC): Status: ACTIVE | Noted: 2020-09-06

## 2020-10-21 LAB
ALBUMIN SERPL-MCNC: 2.8 G/DL (ref 3.5–5.1)
ALP BLD-CCNC: 81 U/L (ref 38–126)
ALT SERPL-CCNC: 15 U/L (ref 11–66)
ANION GAP SERPL CALCULATED.3IONS-SCNC: 13 MEQ/L (ref 8–16)
AST SERPL-CCNC: 18 U/L (ref 5–40)
BASOPHILS # BLD: 0.6 %
BASOPHILS ABSOLUTE: 0.1 THOU/MM3 (ref 0–0.1)
BILIRUB SERPL-MCNC: 0.4 MG/DL (ref 0.3–1.2)
BUN BLDV-MCNC: 12 MG/DL (ref 7–22)
CALCIUM IONIZED: 1.43 MMOL/L (ref 1.12–1.32)
CALCIUM SERPL-MCNC: 10.4 MG/DL (ref 8.5–10.5)
CHLORIDE BLD-SCNC: 112 MEQ/L (ref 98–111)
CO2: 22 MEQ/L (ref 23–33)
CREAT SERPL-MCNC: 1.8 MG/DL (ref 0.4–1.2)
EOSINOPHIL # BLD: 3.7 %
EOSINOPHILS ABSOLUTE: 0.3 THOU/MM3 (ref 0–0.4)
ERYTHROCYTE [DISTWIDTH] IN BLOOD BY AUTOMATED COUNT: 18.5 % (ref 11.5–14.5)
ERYTHROCYTE [DISTWIDTH] IN BLOOD BY AUTOMATED COUNT: 59.9 FL (ref 35–45)
GFR SERPL CREATININE-BSD FRML MDRD: 48 ML/MIN/1.73M2
GLUCOSE BLD-MCNC: 139 MG/DL (ref 70–108)
GLUCOSE BLD-MCNC: 140 MG/DL (ref 70–108)
GLUCOSE BLD-MCNC: 141 MG/DL (ref 70–108)
GLUCOSE BLD-MCNC: 141 MG/DL (ref 70–108)
GLUCOSE BLD-MCNC: 143 MG/DL (ref 70–108)
GLUCOSE BLD-MCNC: 146 MG/DL (ref 70–108)
HCT VFR BLD CALC: 24.7 % (ref 42–52)
HEMOGLOBIN: 7.5 GM/DL (ref 14–18)
IMMATURE GRANS (ABS): 0.06 THOU/MM3 (ref 0–0.07)
IMMATURE GRANULOCYTES: 0.7 %
LYMPHOCYTES # BLD: 16.6 %
LYMPHOCYTES ABSOLUTE: 1.4 THOU/MM3 (ref 1–4.8)
MAGNESIUM: 1.5 MG/DL (ref 1.6–2.4)
MCH RBC QN AUTO: 27.8 PG (ref 26–33)
MCHC RBC AUTO-ENTMCNC: 30.4 GM/DL (ref 32.2–35.5)
MCV RBC AUTO: 91.5 FL (ref 80–94)
MONOCYTES # BLD: 7.9 %
MONOCYTES ABSOLUTE: 0.7 THOU/MM3 (ref 0.4–1.3)
NUCLEATED RED BLOOD CELLS: 0 /100 WBC
PHOSPHORUS: 2.6 MG/DL (ref 2.4–4.7)
PLATELET # BLD: 457 THOU/MM3 (ref 130–400)
PMV BLD AUTO: 10 FL (ref 9.4–12.4)
POTASSIUM SERPL-SCNC: 3.1 MEQ/L (ref 3.5–5.2)
RBC # BLD: 2.7 MILL/MM3 (ref 4.7–6.1)
SEG NEUTROPHILS: 70.5 %
SEGMENTED NEUTROPHILS ABSOLUTE COUNT: 6 THOU/MM3 (ref 1.8–7.7)
SODIUM BLD-SCNC: 147 MEQ/L (ref 135–145)
TOTAL PROTEIN: 6.3 G/DL (ref 6.1–8)
WBC # BLD: 8.5 THOU/MM3 (ref 4.8–10.8)

## 2020-10-21 PROCEDURE — 92526 ORAL FUNCTION THERAPY: CPT

## 2020-10-21 PROCEDURE — 82330 ASSAY OF CALCIUM: CPT

## 2020-10-21 PROCEDURE — 99232 SBSQ HOSP IP/OBS MODERATE 35: CPT | Performed by: FAMILY MEDICINE

## 2020-10-21 PROCEDURE — 85025 COMPLETE CBC W/AUTO DIFF WBC: CPT

## 2020-10-21 PROCEDURE — 6360000002 HC RX W HCPCS: Performed by: NURSE PRACTITIONER

## 2020-10-21 PROCEDURE — 97110 THERAPEUTIC EXERCISES: CPT

## 2020-10-21 PROCEDURE — 6360000002 HC RX W HCPCS: Performed by: FAMILY MEDICINE

## 2020-10-21 PROCEDURE — 6370000000 HC RX 637 (ALT 250 FOR IP): Performed by: NURSE PRACTITIONER

## 2020-10-21 PROCEDURE — 84100 ASSAY OF PHOSPHORUS: CPT

## 2020-10-21 PROCEDURE — 94640 AIRWAY INHALATION TREATMENT: CPT

## 2020-10-21 PROCEDURE — 97530 THERAPEUTIC ACTIVITIES: CPT

## 2020-10-21 PROCEDURE — 6360000002 HC RX W HCPCS: Performed by: INTERNAL MEDICINE

## 2020-10-21 PROCEDURE — 82948 REAGENT STRIP/BLOOD GLUCOSE: CPT

## 2020-10-21 PROCEDURE — 6370000000 HC RX 637 (ALT 250 FOR IP): Performed by: FAMILY MEDICINE

## 2020-10-21 PROCEDURE — 97166 OT EVAL MOD COMPLEX 45 MIN: CPT

## 2020-10-21 PROCEDURE — 2580000003 HC RX 258: Performed by: NURSE PRACTITIONER

## 2020-10-21 PROCEDURE — 36592 COLLECT BLOOD FROM PICC: CPT

## 2020-10-21 PROCEDURE — 99233 SBSQ HOSP IP/OBS HIGH 50: CPT | Performed by: INTERNAL MEDICINE

## 2020-10-21 PROCEDURE — 2580000003 HC RX 258: Performed by: INTERNAL MEDICINE

## 2020-10-21 PROCEDURE — 1200000003 HC TELEMETRY R&B

## 2020-10-21 PROCEDURE — 6360000002 HC RX W HCPCS: Performed by: STUDENT IN AN ORGANIZED HEALTH CARE EDUCATION/TRAINING PROGRAM

## 2020-10-21 PROCEDURE — 2500000003 HC RX 250 WO HCPCS: Performed by: FAMILY MEDICINE

## 2020-10-21 PROCEDURE — 80053 COMPREHEN METABOLIC PANEL: CPT

## 2020-10-21 PROCEDURE — C9113 INJ PANTOPRAZOLE SODIUM, VIA: HCPCS | Performed by: INTERNAL MEDICINE

## 2020-10-21 PROCEDURE — 83735 ASSAY OF MAGNESIUM: CPT

## 2020-10-21 PROCEDURE — 36415 COLL VENOUS BLD VENIPUNCTURE: CPT

## 2020-10-21 PROCEDURE — 2580000003 HC RX 258: Performed by: STUDENT IN AN ORGANIZED HEALTH CARE EDUCATION/TRAINING PROGRAM

## 2020-10-21 PROCEDURE — 94760 N-INVAS EAR/PLS OXIMETRY 1: CPT

## 2020-10-21 RX ORDER — FERROUS SULFATE 325(65) MG
325 TABLET ORAL 2 TIMES DAILY WITH MEALS
Status: DISCONTINUED | OUTPATIENT
Start: 2020-10-22 | End: 2020-11-06 | Stop reason: HOSPADM

## 2020-10-21 RX ORDER — POTASSIUM CHLORIDE 29.8 MG/ML
20 INJECTION INTRAVENOUS
Status: COMPLETED | OUTPATIENT
Start: 2020-10-21 | End: 2020-10-21

## 2020-10-21 RX ORDER — POTASSIUM CHLORIDE 20 MEQ/1
40 TABLET, EXTENDED RELEASE ORAL PRN
Status: DISCONTINUED | OUTPATIENT
Start: 2020-10-21 | End: 2020-11-06 | Stop reason: HOSPADM

## 2020-10-21 RX ORDER — POTASSIUM CHLORIDE 7.45 MG/ML
10 INJECTION INTRAVENOUS PRN
Status: DISCONTINUED | OUTPATIENT
Start: 2020-10-21 | End: 2020-11-06 | Stop reason: HOSPADM

## 2020-10-21 RX ORDER — PANTOPRAZOLE SODIUM 40 MG/1
40 TABLET, DELAYED RELEASE ORAL
Status: DISCONTINUED | OUTPATIENT
Start: 2020-10-22 | End: 2020-10-23 | Stop reason: SDUPTHER

## 2020-10-21 RX ORDER — MAGNESIUM SULFATE IN WATER 40 MG/ML
2 INJECTION, SOLUTION INTRAVENOUS ONCE
Status: COMPLETED | OUTPATIENT
Start: 2020-10-21 | End: 2020-10-21

## 2020-10-21 RX ORDER — ACETAMINOPHEN 650 MG/1
650 SUPPOSITORY RECTAL EVERY 6 HOURS PRN
Status: DISCONTINUED | OUTPATIENT
Start: 2020-10-21 | End: 2020-11-06 | Stop reason: HOSPADM

## 2020-10-21 RX ADMIN — SODIUM CHLORIDE 300 MG: 9 INJECTION, SOLUTION INTRAVENOUS at 13:39

## 2020-10-21 RX ADMIN — POTASSIUM CHLORIDE: 2 INJECTION, SOLUTION, CONCENTRATE INTRAVENOUS at 02:10

## 2020-10-21 RX ADMIN — SODIUM CHLORIDE: 234 INJECTION INTRAMUSCULAR; INTRAVENOUS; SUBCUTANEOUS at 17:29

## 2020-10-21 RX ADMIN — ENOXAPARIN SODIUM 40 MG: 40 INJECTION SUBCUTANEOUS at 20:53

## 2020-10-21 RX ADMIN — POTASSIUM CHLORIDE 20 MEQ: 29.8 INJECTION, SOLUTION INTRAVENOUS at 15:16

## 2020-10-21 RX ADMIN — ENOXAPARIN SODIUM 40 MG: 40 INJECTION SUBCUTANEOUS at 10:36

## 2020-10-21 RX ADMIN — MAGNESIUM SULFATE 2 G: 2 INJECTION INTRAVENOUS at 15:16

## 2020-10-21 RX ADMIN — GLYCOPYRROLATE AND FORMOTEROL FUMARATE 2 PUFF: 9; 4.8 AEROSOL, METERED RESPIRATORY (INHALATION) at 09:33

## 2020-10-21 RX ADMIN — ACETAMINOPHEN 650 MG: 650 SUPPOSITORY RECTAL at 20:41

## 2020-10-21 RX ADMIN — POTASSIUM CHLORIDE 20 MEQ: 29.8 INJECTION, SOLUTION INTRAVENOUS at 13:39

## 2020-10-21 RX ADMIN — HYDROCORTISONE ACETATE 25 MG: 25 SUPPOSITORY RECTAL at 10:35

## 2020-10-21 RX ADMIN — PANTOPRAZOLE SODIUM 40 MG: 40 INJECTION, POWDER, FOR SOLUTION INTRAVENOUS at 10:36

## 2020-10-21 RX ADMIN — GLYCOPYRROLATE AND FORMOTEROL FUMARATE 2 PUFF: 9; 4.8 AEROSOL, METERED RESPIRATORY (INHALATION) at 21:42

## 2020-10-21 RX ADMIN — ACETAMINOPHEN 650 MG: 650 SUPPOSITORY RECTAL at 10:36

## 2020-10-21 RX ADMIN — PIPERACILLIN AND TAZOBACTAM 3.38 G: 3; .375 INJECTION, POWDER, LYOPHILIZED, FOR SOLUTION INTRAVENOUS at 18:09

## 2020-10-21 ASSESSMENT — PAIN SCALES - GENERAL
PAINLEVEL_OUTOF10: 0
PAINLEVEL_OUTOF10: 0

## 2020-10-21 NOTE — CONSULTS
CONSULTATION NOTE :ID       Patient - Mark Meraz,  Age - 44 y.o.    - 1970      Room Number - -25/025-A   MRN -  [id number]   Acct # - [de-identified]  Date of Admission -  2020  9:02 AM  Patient's PCP: Shante Guaman MD     Requesting Physician: Benja Salazar MD    REASON FOR CONSULTATION   Low grade fever. He has been in hospital for 4 wks  CHIEF COMPLAINT   weakness    HISTORY OF PRESENT ILLNESS       This is a very pleasant 44 y.o. male who was admitted to the hospital with a chief complaints of weakness. He was admitted on 2020. Patient was previously treated for Covid. Required prolonged hospitalization and subsequently was discharged only to come back with generalized weakness. He had been in the Covid unit in isolation. He had Flexi-Seal and has been almost bedridden. He was transferred to the  I was asked to see him as low-grade fever. When I saw him he is very weak could not make any conversation. His medical record was reviewed discussed with nursing staff. Main concerns at this time is his difficulty of swallowing and he has Flexi-Seal related perianal excoriation and likely rectal ulceration as he has been having bloody stool. Is morbidly obese male patient with history of osteoarthritis rheumatoid arthritis coronary artery disease diabetes congestive heart failure and depression.   Currently on IV Zosyn for low-grade fever he has been evaluated by speech and swallow for his dysphagia    PAST MEDICAL  HISTORY       Past Medical History:   Diagnosis Date    Arthritis     RHEUMATOID    Atrial fibrillation ([address]     BPH (benign prostatic hyperplasia)     CAD (coronary artery disease)     Carpal tunnel syndrome on right     rt hand    Chronic gout     DM2 (diabetes mellitus, type 2) (HCC)     GERD (gastroesophageal reflux disease)     Heart failure with preserved ejection fraction ([address]     History of alcohol abuse  History of osteomyelitis     Hx of R foot wound, osteomyelitis July 2018 requiring surgery and IV Vanco    Hyperlipidemia     Hypertension, essential     Hypogonadism, male     Major depression     Mood disorder [facility]     Morbid obesity ([facility]     Muscle weakness     DURAN (nonalcoholic steatohepatitis)     Obstructive sleep apnea treated with continuous positive airway pressure (CPAP)     KIARA (obstructive sleep apnea)     Vitamin D deficiency        PAST SURGICAL HISTORY     Past Surgical History:   Procedure Laterality Date    FOOT SURGERY Right     2018, R foot osteomyelitis    HIP SURGERY Left 6/29/2020    bilateral hip steroid injection, Left HIP FIRST performed by Toyna Golden MD at [facility]      as a baby         MEDICATIONS:       Scheduled Meds:   [START ON 10/22/2020] ferrous sulfate  325 mg Oral BID WC    [START ON 10/22/2020] pantoprazole  40 mg Oral QAM AC    insulin lispro  0-12 Units Subcutaneous Q6H    piperacillin-tazobactam  3.375 g Intravenous Q8H    potassium bicarb-citric acid  40 mEq Oral BID    potassium chloride  40 mEq Oral Once    [Held by provider] gabapentin  400 mg Oral BID    [Held by provider] modafinil  100 mg Oral Daily    enoxaparin  40 mg Subcutaneous BID    insulin glargine  38 Units Subcutaneous Nightly    lidocaine 1 % injection  5 mL Intradermal Once    sodium chloride flush  10 mL Intravenous 2 times per day    magnesium replacement protocol   Other RX Placeholder    phosphorus replacement protocol   Other RX Placeholder    calcium replacement protocol   Other RX Placeholder    [Held by provider] ARIPiprazole  15 mg Oral Daily    aspirin  81 mg Oral Daily    glycopyrrolate-formoterol  2 puff Inhalation BID     Continuous Infusions:   PN-Adult  3 IN 1 Central Line (Custom)      IV infusion builder 80 mL/hr at 10/21/20 0210    dextrose       PRN Meds:acetaminophen, potassium chloride **OR** potassium alternative oral replacement **OR** potassium chloride, potassium chloride, sodium chloride flush, lidocaine, acetaminophen **OR** [DISCONTINUED] acetaminophen, polyethylene glycol, promethazine **OR** ondansetron, albuterol, glucose, dextrose, glucagon (rDNA), dextrose  Allergies:   ALLERGIES:    Pcn [penicillins]        SOCIAL HISTORY:     TOBACCO:   reports that he has never smoked. He has never used smokeless tobacco.     ETOH:   reports previous alcohol use. FAMILY HISTORY:         Problem Relation Age of Onset    Heart Disease Mother         [facility] Cancer Paternal Aunt         lung       REVIEW OF SYSTEMS:     Unable to get much history from patient    PHYSICAL EXAM:     BP (!) 157/91   Pulse 85   Temp 100 °F (37.8 °C) (Rectal)   Resp 20   Ht 5' 8\" (1.727 m)   Wt (!) 310 lb (140.6 kg)   SpO2 96%   BMI 47.14 kg/m²   General apperance: Lethargic  HEENT: Pale conjunctiva, unicteric sclera, moist oral mucosa. Chest: Lateral air entry, diminished breath sounds. Cardiovascular:  RRR ,S1S2, no murmur or gallop. Abdomen:  Soft, non tender to palpation. Has excoriated perianal skin with deep ulceration related to Flexi-Seal   extremities: Chronic skin changes  Skin:  Warm and dry. CNS: Very lethargic. LABS:     CBC:   Recent Labs     10/19/20  0641 10/20/20  0352 10/21/20  0540   WBC 9.5 9.7 8.5   HGB 8.1* 7.6* 7.5*   * 495* 457*     BMP:    Recent Labs     10/19/20  0641 10/20/20  0352 10/21/20  0540   * 148* 147*   K 3.6 3.2* 3.1*   * 115* 112*   CO2 19* 20* 22*   BUN 16 15 12   CREATININE 1.7* 1.8* 1.8*   GLUCOSE 105 138* 139*     Calcium:  Recent Labs     10/21/20  0540   CALCIUM 10.4     Ionized Calcium:No results for input(s): IONCA in the last 72 hours. Magnesium:  Recent Labs     10/21/20  1327   MG 1.5*     Phosphorus:  Recent Labs     10/21/20  1327   PHOS 2.6     BNP:No results for input(s): BNP in the last 72 hours.   Glucose:  Recent Labs     10/21/20  0617 10/21/20  1037 10/21/20  1547   POCGLU 146* 141* 143*     HgbA1C: No results for input(s): LABA1C in the last 72 hours. INR: No results for input(s): INR in the last 72 hours. Hepatic:   Recent Labs     10/21/20  0540   ALKPHOS 81   ALT 15   AST 18   PROT 6.3   BILITOT 0.4   LABALBU 2.8*     IMAGING:    Micro:   Lab Results   Component Value Date    BC No growth-preliminary No growth  09/24/2020       Problem list of patient      Patient Active Problem List   Diagnosis Code    Chronic diastolic congestive heart failure (HCC) I50.32    Atrial fibrillation (HCC) I48.91    BPH (benign prostatic hyperplasia) N40.0    Carpal tunnel syndrome on right G56.01    Chronic gout M1A. 9XX0    DM2 (diabetes mellitus, type 2) ([facility] E11.9    Heart failure with preserved ejection fraction (HCC) I50.30    History of alcohol abuse F10.11    History of osteomyelitis Z87.39    Hypertension, essential I10    Hypogonadism, male E29.1    Major depression F32.9    Morbid obesity (HCC) E66.01    DURAN (nonalcoholic steatohepatitis) K75.81    KIARA (obstructive sleep apnea) G47.33    Vitamin D deficiency E55.9    Normocytic anemia D64.9    Physical deconditioning R53.81    Mood disorder (HCC) F39    Hallucinations R44.3    GERD (gastroesophageal reflux disease) K21.9    Wound of buttock S31.809A    Chest wall pain with tenderness R07.89    Primary osteoarthritis of left hip M16.12    COVID-19 U07.1    COVID-19 virus infection U07.1    Acute respiratory failure with hypoxia (HCC) J96.01    ARF (acute renal failure) with tubular necrosis (HCC) N17.0    Hypokalemia E87.6    Other hypotension I95.89    Pneumonia due to COVID-19 virus U07.1, J12.89           Impression and Recommendation:   Covid related respiratory failure  Covid related deconditioning with functional quadriplegia  Dysphagia  Rectal ulceration related to Flexi-Seal  Acute kidney injury  Diabetes  We will apply zinc oxide to the perianal area.   Avoid Flexi-Seal again  He needs to be turned frequently. Speech and swallow are evaluating for his dysphagia  Low-grade fever is likely related to the above problems. He will ectasis    Anemia  Infection Control:   Isolation due to MRSA colonization  Discharge Planning (Coordination of out patient care:   He needs ECF when he is more stable  thank you Arron Officer, MD for allowing me to participate in this patient's care.     Aristides Castellanos MD,FACP 10/21/2020 5:18 PM

## 2020-10-21 NOTE — PROGRESS NOTES
PROGRESS NOTE      Patient:  Rush Narayan      Unit/Bed:6K-25/025-A    YOB: 1970    MRN: [id number]       Acct: [de-identified]     PCP: Juan Luis Staples MD    Date of Admission: 9/23/2020      Assessment/Plan:    Anticipated Discharge in : TBD pending progress    Active Hospital Problems    Diagnosis Date Noted    Pneumonia due to COVID-19 virus [U07.1, J12.89] 10/18/2020    ARF (acute renal failure) with tubular necrosis (HCC) [N17.0]     Hypokalemia [E87.6]     Other hypotension [I95.89]     Acute respiratory failure with hypoxia ([facility]) [J96.01]        Sepsis due to Covid pneumonia    -initially resolved, now having intermittent fever again   -Complicated medical course. Recent discharge from [facility] for COVID-19 on 9/15/2020.   -Readmitted for COVID-19 pneumonia on 9/23/2020, intubation on 9/23/2020 and 10/6/2020. Successfully weaned to room air currently.   -Has been treated with vancomycin, doxycycline (10/6-10/14), Zosyn (10/6-10/16) throughout his hospital stay. Likely MRSA pneumonia.   -Per ICU notes patient would be a good candidate for Zyvox, however due to patient's inability to swallow he has been unable to receive Zyvox. -Previously had been afebrile however today patient febrile to 100.4.   -Currently no respiratory symptoms. Has progressed well from a respiratory standpoint.   -Due to recurrent fever ID has been consulted. Possible source of infection due to rectal/buttock wound. -IV Zosyn restarted  -Appreciate ID input.   -Repeat FEES on 10/23/2020  -Unable to place NG tube    Acute hypoxic respiratory failure due to COVID-19 pneumonia, resolved     -Intubated on 10/6/2020, extubated on 10/15/2020  -Currently saturating well on room air    KRISTY due to hypotension, improving     -Creatinine 1.1 on admission. Trended up to 4.1 on 10/7/2020. Baseline creatinine 0.6 to 0.8   -Nephrology following.   Appreciate nephrology input.  -Creatinine stable at 1.8 today  -Nephrology recommending continuing IV hydration    Hypernatremia, likely due to dehydration, improving  -Continue D5W    5. Acute encephalopathy, etiology unclear, likely related to recent Covid 19 infection    -CT head on 10/20/2020 unremarkable    6. Acute on chronic Normocytic anemia due to acute rectal bleeding- secondary to gluteal/rectal ulcerations vs hemodilutional from IVF   -Hemoglobin of 7.5 today. Baseline Hgb 7-8 in 10/2020, was around 11 in 9/2020  -s/p 2 units PRBC  -No acute signs of bleeding, however patient does have rectal bleed from rectal ulcer from Flexi-Seal  -This may cause some rectal bleeding  -Continue to monitor hemoglobin  -Transfuse for hemoglobin less than 7  -GI on-board. Appreciate GI input     7. Hypokalemia due to hypomagnesemia and diarrhea     -replace per protocol   -BMP in am     8. Hypomagnesemia likely due to poor oral intake and diarrhea    -Replace per protocol  -Check magnesium level    9. Dysphagia    -pt failed Fiberoptic Endoscopic Evaluation of the Swallow (FEES). ST recommend NPO, per ST NGT not an option right now due to failed FEES  -start TPN temporarily   - plan to repeat FEES on Friday 10/23    10. Diarrhea     -c diff test ordered by GI    11. At risk for malnutrition    -dietician pn-board, appreciate input     12. Sarahi-rectal lesion    -wound ostomy on-board, appreciate input. Cont zinc oxide as ordered. 13. Hx of KIARA  -Noncompliant with CPAP at home  -Pulmonology consulted  -BiPAP ordered    14. Diabetes mellitus type 2  -Blood sugars within goal range of 140-180  -Continue Lantus at current dose and sliding scale insulin  -Accu-Cheks q. 4 and at bedtime  -Hypoglycemia protocol in place    15. Essential hypertension  -Controlled  -Avoid hypotension with recent sepsis and renal failure    16. Physical Deconditioning  Patient likely will need SNF versus LTAC at discharge. Faye evaluation consult ordered    16.   Thrombocytosis, likely reactive due to recent COVID-19    -CBC in am     Chief Complaint: Shortness of breath    Hospital Course:  78-year-old morbidly obese black male lifetime non-smoker. Patient has a history of morbid obesity associated with type 2 diabetes mellitus, prior alcohol abuse, hyperlipidemia, hypertension, hypogonadism, nonalcoholic steatohepatitis, obstructive sleep apnea, and gout. Patient was hospitalized 9/6/2020 through 9/15/2020 with hypoxemic respiratory failure secondary to COVID-19 associated with diffuse bilateral infiltrates. At that time, patient received Decadron, remdesivir, and danazol. During hospitalization, he had issues with atrial fibrillation. His insulin therapy required adjustment. He was discharged home on subcutaneous Lovenox. Patient returned back to the emergency room on 9/23/2020 with increasing SOB and progressive hypoxia. CXR showed diffuse infiltrates. Deteriorated and required intubation. Patient underwent bronchoscopy on 9/27/2020 which demonstrated no mucus production. Patient extubated 10/2/2020. Patient reintubated for progressive respiratory failure with progressive obtundation on 10/6/2020. This was associated with acute oliguric renal failure. Patient was intubated 10/6/2020, and underwent volume resuscitation. Fractional excretion of sodium returned less than 1 which was consistent with prerenal azotemia. After volume resuscitation, patient demonstrated that he was hemoconcentrated with a drop of 2 g in the hemoglobin. With volume resuscitation, urine output did improve. After patient was intubated on 10/6/2020, he underwent pulmonary lavage which showed MRSA on the PCR but no other organism. However, patient was found to have greater than 200 white blood cells in the urine. Patient was treated with Zosyn and doxycycline. Previously, patient had received vancomycin and developed fever while on vancomycin. Therefore vancomycin was not continued.   Sputum subsequently grew staph aureus. Zosyn was discontinued but doxycycline continued on 10/14/2020. Patient did well with spontaneous breathing trial and was extubated 10/15/2020.    10/21: Weaned to room air. Respiratory status remained stable. Spiked fever this morning at 100.4 °F.  Possibly due to rectal wound due to Flexi-Seal.  Restarted on IV Zosyn, ID reconsulted. Patient would be a good candidate for SNF versus LTACH at discharge    Subjective: Patient laying in bed comfortably today. Denies any chest pain or shortness of breath. No issues noted overnight. Patient's hemoglobin continues to trend downward, this may be dilutional or due to wound bleeding from rectal wound. Patient with fever of 100.4 °F today we will reconsult ID and start IV Zosyn.       Medications:  Reviewed    Infusion Medications    PN-Adult  3 IN 1 Central Line (Custom) 100 mL/hr at 10/21/20 1729    dextrose       Scheduled Medications    [START ON 10/22/2020] ferrous sulfate  325 mg Oral BID WC    [START ON 10/22/2020] pantoprazole  40 mg Oral QAM AC    insulin lispro  0-12 Units Subcutaneous Q6H    piperacillin-tazobactam  3.375 g Intravenous Q8H    potassium bicarb-citric acid  40 mEq Oral BID    potassium chloride  40 mEq Oral Once    [Held by provider] gabapentin  400 mg Oral BID    [Held by provider] modafinil  100 mg Oral Daily    enoxaparin  40 mg Subcutaneous BID    insulin glargine  38 Units Subcutaneous Nightly    lidocaine 1 % injection  5 mL Intradermal Once    sodium chloride flush  10 mL Intravenous 2 times per day    magnesium replacement protocol   Other RX Placeholder    phosphorus replacement protocol   Other RX Placeholder    calcium replacement protocol   Other RX Placeholder    [Held by provider] ARIPiprazole  15 mg Oral Daily    aspirin  81 mg Oral Daily    glycopyrrolate-formoterol  2 puff Inhalation BID     PRN Meds: acetaminophen, potassium chloride **OR** potassium alternative oral replacement **OR** potassium chloride, potassium chloride, sodium chloride flush, lidocaine, acetaminophen **OR** [DISCONTINUED] acetaminophen, polyethylene glycol, promethazine **OR** ondansetron, albuterol, glucose, dextrose, glucagon (rDNA), dextrose      Intake/Output Summary (Last 24 hours) at 10/21/2020 1857  Last data filed at 10/21/2020 1550  Gross per 24 hour   Intake 3734.43 ml   Output 2475 ml   Net 1259.43 ml       Diet:  PN-Adult  3 IN 1 Central Line (Custom)    Exam:  BP (!) 157/91   Pulse 85   Temp 100 °F (37.8 °C) (Rectal)   Resp 20   Ht 5' 8\" (1.727 m)   Wt (!) 310 lb (140.6 kg)   SpO2 96%   BMI 47.14 kg/m²     General appearance: No apparent distress, acute on chronically ill-appearing. Morbidly obese  HEENT: Pupils equal, round, and reactive to light. Conjunctivae/corneas clear. Neck: Supple, with full range of motion. No jugular venous distention. Trachea midline. Respiratory:  Normal respiratory effort. (+) coarse breath sounds on anterior lung fields. Cardiovascular: Regular rate and rhythm with normal S1/S2 without murmurs, rubs or gallops. Abdomen: Soft, non-tender, non-distended with normal bowel sounds. Musculoskeletal: passive and active ROM x 4 extremities. Skin: Skin color, texture, turgor normal.  No rashes or lesions. Neurologic:  Neurovascularly intact without any focal sensory/motor deficits.  Cranial nerves: II-XII intact, grossly non-focal.  Psychiatric: Alert and oriented, thought content appropriate, normal insight  Capillary Refill: Brisk,< 3 seconds   Peripheral Pulses: +2 palpable, equal bilaterally       Labs:   Recent Labs     10/19/20  0641 10/20/20  0352 10/21/20  0540   WBC 9.5 9.7 8.5   HGB 8.1* 7.6* 7.5*   HCT 26.9* 25.2* 24.7*   * 495* 457*     Recent Labs     10/19/20  0641 10/20/20  0352 10/21/20  0540 10/21/20  1327   * 148* 147*  --    K 3.6 3.2* 3.1*  --    * 115* 112*  --    CO2 19* 20* 22*  --    BUN 16 15 12  --    CREATININE 1.7* 1.8* 1.8*  --    CALCIUM 10.8* 10.5 10. 4  --    PHOS  --   --   --  2.6     Recent Labs     10/19/20  0641 10/20/20  0352 10/21/20  0540   AST 16 13 18   ALT 16 12 15   BILITOT 0.4 0.4 0.4   ALKPHOS 81 78 81     No results for input(s): INR in the last 72 hours. No results for input(s): Peter Seed in the last 72 hours. Urinalysis:      Lab Results   Component Value Date    NITRU NEGATIVE 10/06/2020    WBCUA > 200 10/06/2020    BACTERIA FEW 10/06/2020    RBCUA 5-10 10/06/2020    BLOODU LARGE 10/06/2020    SPECGRAV >=1.030 10/06/2020    GLUCOSEU NEGATIVE 08/05/2020       Radiology:  CT ABDOMEN PELVIS WO CONTRAST Additional Contrast? None   Final Result   1. Almost complete resolution of previously seen airspace infiltrates in the lung bases. 2. No acute abdominal or pelvic abnormalities. **This report has been created using voice recognition software. It may contain minor errors which are inherent in voice recognition technology. **      Final report electronically signed by Dr. Azeem Goodman on 10/20/2020 10:50 AM      CT HEAD WO CONTRAST   Final Result    No evidence of acute intracranial abnormality. **This report has been created using voice recognition software. It may contain minor errors which are inherent in voice recognition technology. **      Final report electronically signed by Dr. Daniella Parker MD on 10/20/2020 10:13 AM      XR CHEST PORTABLE   Final Result   Ill-defined bilateral lung opacities. Follow-up as clinically indicated. This document has been electronically signed by: Chucky Hernandez MD on 10/20/2020 05:38 AM         XR CHEST PORTABLE   Final Result   Minimal residual atelectasis and/or infiltrate within the bilateral mid    and lower lungs with improvement. Improved pulmonary inflation.       This document has been electronically signed by: Edna Gifford MD on    10/16/2020 02:02 AM         XR CHEST PORTABLE   Final Result   Impression:   Progressive bilateral consolidations or ARDS. This document has been electronically signed by: Wil Leach MD on    10/12/2020 04:59 AM         XR CHEST PORTABLE   Final Result    Similar bilateral ill-defined pneumonia. This document has been electronically signed by: Zack Manzano. Preet Danielle DO on    10/11/2020 09:49 AM         XR CHEST PORTABLE   Final Result   Similar diffuse patchy bilateral airspace disease and consolidations. Support devices as above. This document has been electronically signed by: Gaviota Cao MD on    10/10/2020 04:35 AM         XR CHEST PORTABLE   Final Result   No acute findings. This document has been electronically signed by: Sukhi Metcalf MD on 10/08/2020 07:40 AM         CT ABDOMEN PELVIS WO CONTRAST Additional Contrast? Oral   Final Result    IMPRESSION:   Bilateral lower lobe pneumonia. Known Covid. Continued progress imaging is advised   Distended colon with fluid, air and stool. Correlate for diarrhea. **This report has been created using voice recognition software. It may contain minor errors which are inherent in voice recognition technology. **      Final report electronically signed by Dr. Abhijeet Claire on 10/7/2020 6:49 PM      XR CHEST PORTABLE   Final Result   Bilateral lung consolidation not significant change. This document has been electronically signed by: Sukhi Metcalf MD on 10/07/2020 07:25 AM         US RENAL COMPLETE   Final Result   Unremarkable kidneys. This document has been electronically signed by: Madhu Mcfarland MD on    10/07/2020 01:48 AM         XR CHEST PORTABLE   Final Result   1. There is a new endotracheal tube with the distal tip 1.8 cm above the level of the madi. 2. There is a new esophageal tube with the distal tip projecting over the gastric fundus. 3. There is a stable right PICC with the distal tip projecting over the right atrium. 4. The lung volumes are diminished.  There are bilateral perihilar and the basilar opacities which are similar to the previous examination however may be accentuated by the expiratory technique. There is no pleural effusion. Follow-up chest radiographs    are recommended to confirm complete resolution. **This report has been created using voice recognition software. It may contain minor errors which are inherent in voice recognition technology. **      Final report electronically signed by Dr. Donny Vieyra on 10/6/2020 7:18 AM      XR CHEST PORTABLE   Final Result   Bilateral lung opacities. Follow-up to clearing recommended. This document has been electronically signed by: Sandrita Keita MD on 10/06/2020 06:09 AM         XR CHEST PORTABLE   Final Result   Slightly decreased diffuse patchy bilateral airspace disease. Support devices as above. This document has been electronically signed by: Ingrid Santos MD on    10/03/2020 05:15 AM         XR CHEST PORTABLE   Final Result   ET tube should be retracted 1.5-2.0 cm. No significant change in coarse scattered bilateral infiltrates. This document has been electronically signed by: Brynn Matute MD on    09/30/2020 06:50 AM         XR CHEST PORTABLE   Final Result      Slightly improving bilateral pneumonia. **This report has been created using voice recognition software. It may contain minor errors which are inherent in voice recognition technology. **      Final report electronically signed by Dr. Patricio Johns on 9/27/2020 2:23 PM      XR ABDOMEN FOR NG/OG/NE TUBE PLACEMENT   Final Result   Esophageal route tube tip in the stomach. **This report has been created using voice recognition software. It may contain minor errors which are inherent in voice recognition technology. **      Final report electronically signed by Dr Salvatore Uriostegui on 9/26/2020 10:22 AM      XR CHEST PORTABLE   Final Result   1. Lines and tubes as above.    2. Worsening

## 2020-10-21 NOTE — PLAN OF CARE
Problem: Nutrition  Goal: Optimal nutrition therapy  10/21/2020 1249 by Manuela Shah RD, LD  Outcome: Ongoing   Nutrition Problem #1: Inadequate oral intake  Intervention: Food and/or Nutrient Delivery: Continue NPO, Start Parenteral Nutrititon  Nutritional Goals: Patient will receive PN to meet 75% or more of estimated nutrient needs until able to initiate EN or oral diet.

## 2020-10-21 NOTE — PROGRESS NOTES
Comprehensive Nutrition Assessment    Type and Reason for Visit:  Reassess, Consult(TPN start)    Nutrition Recommendations/Plan:   Recommend TPN dosing weight: 88 kg, 10 kcals/kg/day, 30% lipid kcals, and 1 gram protein/kg/day to start pending phosphorus level. Diet recommendations per SLP. Nutrition Assessment:    Pt. with no improvement from a nutritional standpoint AEB patient remains NPO, unable to place NGT (pt uncooperative), and SLP unable to complete FEES study due to pt uncooperativeness. Remains at risk for further nutritional compromise r/t continued NPO status, not nutrition support since 10/15/20 when extubated, catabolic illness, increased nutrient needs to support wound healing, lengthy hospitalization, and underlying medical condition (covid-19, acute respiratory failure, hypoxia, intubation-extubated 10/15/20, sepsis due to covid pneumonia, KRISTY, anemia, GI bleed (lg clots/ulceration at site of rectum), deconditioning, history of obesity, DM, GERD, alcohol abuse, HLD, HTN, vitamin D deficiency), and need for nutrition support. Nutrition recommendations/interventions as per above. Malnutrition Assessment:  Malnutrition Status: At risk for malnutrition (Comment)(no nutrition in ~5 days)    Context:  Acute Illness     Findings of the 6 clinical characteristics of malnutrition:  Energy Intake:  7 - 50% or less of estimated energy requirements for 5 or more days  Weight Loss:  Unable to assess(weight fluctuations with edema)     Body Fat Loss:  No significant body fat loss     Muscle Mass Loss:  No significant muscle mass loss    Fluid Accumulation:  (+1 and +2 edema) Extremities   Strength:  Not Performed    Estimated Daily Nutrient Needs:  Energy (kcal):  2095-6398 (30-32 kcals/kg IBW in active late phase);  Weight Used for Energy Requirements:  Ideal(70 kg - ideal weight)     Protein (g):  ~140 gms (2/kgm IBW) as renal status allows; Weight Used for Protein Requirements:  Ideal(70 kg) Fluid (ml/day):  per MD;     Nutrition Related Findings:  Recent covid, now testing negative, lengthy hospital corse. Extubated 10/15/20. SLP following recommending NPO. Not answering staff questions or following commands. Pt uncooperative behavioural wise, unable to place NGT and SLP unable to complete FEES study due to behaviours. Spoke to SLP, RN, [address] and pharmacist, planning TPN start today. Pt has PICC line. Sodium 147, Potassium 3.1, BUN 12, Creatinine 1.8, Glucose 139, Chemstick 141. Awaiting phosphorus/magnesium levels. Humalog, lantus, iron, protonix. 1 BM in the last 24 hours.     Wounds:  (perineum unstageable-pressure, scotum skin tear)       Current Nutrition Therapies:    Current Parenteral Nutrition Orders:  · Type and Formula: 3-in-1 Custom   · Lipids: Daily  · Duration: Continuous  · Rate/Volume: per pharmacist  · Current PN Order Provides: 88 kg to dose, 10 kcals/kg/day, 30% lipid kcals, 1 gram protein/kg/day~ 880 kcals, 88 grams protein, 78 grams CHO, 29 grams fat per 24 hours      Anthropometric Measures:  · Height: 5' 8\" (172.7 cm)  · Current Body Weight: 310 lb (140.6 kg)(10/20, bedscale with +1 and +2 edema)   · Admission Body Weight: 285 lb (129.3 kg)(9/23/20, stated, +1 BLE and BUE edema)    · Usual Body Weight: 306 lb (138.8 kg)(EMR, 6/29/20, actual.  299# 8/15/20, bedscale.)     · Ideal Body Weight: 154 lbs;   · BMI: 47.1  · Adjusted Body Weight:  ; (193# (88 kg) adjusted for TPN)   · BMI Categories: Obese Class 3 (BMI 40.0 or greater)       Nutrition Diagnosis:   · Inadequate oral intake related to cognitive or neurological impairment, swallowing difficulty as evidenced by NPO or clear liquid status due to medical condition      Nutrition Interventions:   Food and/or Nutrient Delivery:  Continue NPO, Start Parenteral Nutrititon  Nutrition Education/Counseling:  No recommendation at this time   Coordination of Nutrition Care:  Continued Inpatient Monitoring    Goals:  Patient will receive PN to meet 75% or more of estimated nutrient needs until able to initiate EN or oral diet. Nutrition Monitoring and Evaluation:   Behavioral-Environmental Outcomes:  (n/a)   Food/Nutrient Intake Outcomes:  Diet Advancement/Tolerance, Parenteral Nutrition Intake/Tolerance  Physical Signs/Symptoms Outcomes:  Biochemical Data, Chewing or Swallowing, GI Status, Fluid Status or Edema, Nutrition Focused Physical Findings, Skin, Weight     Discharge Planning:     Too soon to determine     Electronically signed by Cj Richards RD, LD on 10/21/20 at 12:49 PM EDT    Contact: [phone]

## 2020-10-21 NOTE — PROGRESS NOTES
[facility]           Progress Note    Patient Name:  Alba Mitchell  Medical Record Number:  [id number]  YOB: 1970    Date:  10/21/2020  Time:  3:45 PM  Completed By:  Gabi Brooks RN    Reason for Palliative Care Evaluation: goals of care; code status    Advance Directives:none on file  [] [facility] DNR Form  [] Living Will  [] Medical POA    Current Code Status  [x] Full Resuscitation  [] DNR-Comfort Care-Arrest  [] DNR-Comfort Care  [] Limited   [] No CPR   [] No shock   [] No ET intubation/reintubation   [] No resuscitative medications   [] Other limitation:    Performance Status:  20    Family/Patient Discussion:  Patient resting in bed with left eye half open. Patient does respond to his name and asks this RN , \"How are you?\" but patient does not answer after this. Patient is lethargic. No family is at the bedside. Plan/Follow-Up:  Patient's emergency contact listed is Jesus Quintana which appears that this is the patient's / but not a blood relative (as indicated in ACP discussion with Selene Leong on 09/23/2020). Patient has a contact listed as Farhan Fernandes as a parent. Attempted to call the number listed and no answer; voicemail left for him to call this RN and no HIPPA sensitive information left. Will await a return phone call. At this time, it is unclear who is the appropriate person to make medical decisions for the patient.         Gabi Brooks RN  10/21/2020,  3:45 PM

## 2020-10-21 NOTE — PLAN OF CARE
Problem: Falls - Risk of:  Goal: Will remain free from falls  Description: Will remain free from falls  Outcome: Ongoing  Note: Falling star program. Bariatric Bed. . Call light within reach. Side rails up x2. Fall Band. Encouraged to use call system. Pathway clear. Belongings in reach. Problem: Falls - Risk of:  Goal: Absence of physical injury  Outcome: Ongoing  Note: No harm to patient this shift. Problem: Skin Integrity:  Goal: Will show no infection signs and symptoms  Description: Will show no infection signs and symptoms  Note: Fever 100.1 Orally. Labs being monitored. Will continue to reassess. Problem: Skin Integrity:  Goal: Absence of new skin breakdown  Description: Absence of new skin breakdown  Note: See skin assessment. Repositioning patient. Problem: Urinary Retention:  Goal: Urinary elimination within specified parameters  Description: Urinary elimination within specified parameters  Outcome: Ongoing  Note: Dimas Catheter in place for retention. Failed void trail this week. Problem: Nutrition  Goal: Optimal nutrition therapy  Note: NPO. Speech evaluating patient. Problem: Serum Glucose Level - Abnormal:  Goal: Ability to maintain appropriate glucose levels will improve  Description: Ability to maintain appropriate glucose levels will improve  Note: Blood sugars checked . Insulin held due to patient being NPO. Problem: Injury - Risk of, Physical Injury:  Goal: Will remain free from falls  Description: Will remain free from falls  Outcome: Ongoing  Note: Falling star program. Bariatric Bed. . Call light within reach. Side rails up x2. Fall Band. Encouraged to use call system. Pathway clear. Belongings in reach. Problem: Injury - Risk of, Physical Injury:  Goal: Absence of physical injury  Outcome: Ongoing  Note: No harm to patient this shift. Care plan reviewed with patient. Will review and discuss care plan with family when available.

## 2020-10-21 NOTE — CARE COORDINATION
10/21/20, 1:48 PM EDT    DISCHARGE PLANNING EVALUATION    Nurse reports pt not able to communicate well and not following most commands. DASIA spoke with pts   Courtney Le hoping to get mental health history for [place] screen which needs to be completed for [facility]. Sri Coleman states they only thing he knows is pt was going to "[facility]" prior to going to [facility] last year. Sri Coleman is not aware of psych hospitalizations or why pt is receiving Medicare or if pt receives Social Security. DASIA called [facility] and left message for Arlene Bauer to fax copy of [place] screen completed prior to pt being admitted there last year. DASIA cannot find in [facility].

## 2020-10-21 NOTE — PROGRESS NOTES
[facility]  INPATIENT OCCUPATIONAL THERAPY  STRZ RENAL TELEMETRY 6K  EVALUATION    Time:    Time In: 1350  Time Out: 1412  Timed Code Treatment Minutes: 10 Minutes  Minutes: 22      completed co-tx with PT this date d/t medical complexity and pt unable to tolerate both session. Date: 10/21/2020  Patient Name: Kenneth Bloom,   Gender: male      MRN: [id number]  : 1970  (44 y.o.)  Referring Practitioner: Dr. Noemy Lind  Diagnosis: Acute Respiratory Failure with Hypoxemia  Additional Pertinent Hx: Pt had been hospitalized previous in the month with COVID-19. He was discharged home and had to be readmitted with increased SOB and sputum production. He had deteriorating breathing function and hand to be intubated for 10 days before extubation on 10/2/20. Pt re-intubated and extubation on 10/19/20. Restrictions/Precautions:  Restrictions/Precautions: General Precautions, Fall Risk    Subjective  Chart Reviewed: Yes, Orders, Progress Notes, History and Physical  Patient assessed for rehabilitation services?: Yes    Subjective: Pt lying in bed and agreeable to OT session. Pt requiring cues for alertness throughout session. Pt attempting to converse with simple conversation during. Pain:  Pain Assessment  Patient Currently in Pain: Denies    Social/Functional History:  Lives With: Alone  Type of Home: Apartment                      Additional Comments: PLOF is unknown. Pt did state he lives alone in an apartment, but due to his difficulty communicating, unable to obtain more information.     VISION:WNL Pt tends to keep head turned to left side but will cross midline when looking to right side when asked to find person or object     HEARING:  WNL    COGNITION: Slow Processing, Decreased Recall, Decreased Insight, Impaired Memory, Decreased Problem Solving, Decreased Safety Awareness, Decreased Arousal and Difficulty Following Commands    RANGE OF MOTION:  Bilateral Upper Extremity:  pt demo minimal active movement of bilateral UEs when laying in bed . When sitting EOB pt was able to move bilaterl UEs into a supportiive position with wrist flexed and forarms pronated requiring therapist to reposition UEs with a supinated forearm. pt was able to bring right UE up to nipple line of chest in attempts to bring to nose. Pt unable to bring left UE up at all. STRENGTH:  Bilateral Upper Extremity:  bilateral UE general weakness and deconditioning throughout     ADL:   Grooming: Dependent. to wash face d/t pt unable to bring UEs up to face d/t weakness. BALANCE:  Sitting Balance:  Minimal Assistance, X 1. progressing to max A at EOB with increased fatigue. pt demo a lateral lean to left side as well requiring cues and assistance to sit and bring trunk to midline. BED MOBILITY:  Supine to Sit: Maximum Assistance, X 2    Sit to Supine: Maximum Assistance, X 2    Dep x2 to roll to either side    TRANSFERS:  OTR to further assess as able    FUNCTIONAL MOBILITY:  OTR to further assess when pts strength and endurance increases as well as pt being more cogntively alert         Activity Tolerance:  Patient tolerance of  treatment: fair. Pt equiring increased assistance during session with increased fatigue thorughout as well. Pt asking at times to lay back down. Assessment:  Assessment: Pt demo decreased ability to complete his ADL tasks and mobility at [facility] and requiring increased burden of care of 2 assist. Pt would benefit from skilled OT services to increase his endurance and strength for better balance, tolrance and ability to participate in ADL tsks.   Performance deficits / Impairments: Decreased functional mobility , Decreased endurance, Decreased ADL status, Decreased balance, Decreased strength, Decreased safe awareness, Decreased cognition  Prognosis: Fair  REQUIRES OT FOLLOW UP: Yes  Decision Making: High Complexity    Treatment Initiated: Treatment and education initiated within context of evaluation. Evaluation time included review of current medical information, gathering information related to past medical, social and functional history, completion of standardized testing, formal and informal observation of tasks, assessment of data and development of plan of care and goals. Treatment time included skilled education and facilitation of tasks to increase safety and independence with ADL's for improved functional independence and quality of life. Discharge Recommendations:  Patient would benefit from continued therapy after discharge, Subacute/Skilled Nursing Facility    Patient Education:  OT Education: OT Role, Plan of Care  Patient Education: increasing activity    Equipment Recommendations: Other: assess in discharge environment    Plan:  Times per week: 3-5x  Current Treatment Recommendations: Strengthening, Patient/Caregiver Education & Training, Balance Training, Functional Mobility Training, Endurance Training, Safety Education & Training, Self-Care / ADL. See long-term goal time frame for expected duration of plan of care. If no long-term goals established, a short length of stay is anticipated. Goals:  Patient goals : di dnot state  Short term goals  Time Frame for Short term goals: by discharge  Short term goal 1: Pt to sit EOB wit consistent balance of mod A x1 and CGA x1 > 12 min to increase endurance and core strength for participatin in ADL tasks  Short term goal 2: pt to increase UB strength and endurance of bilateral UEs by being able to faraz for and touch face with right UE to increase ability for self feeding. Short term goal 3: Pt to complete simple grooming tsks with mod A x1  Short term goal 4: OTR to further assess         Following session, patient left in safe position with all fall risk precautions in place.

## 2020-10-21 NOTE — CARE COORDINATION
10/21/20, 8:56 AM EDT    DISCHARGE ON [address] day: 28  Location: -25/025-A Reason for admit: Acute respiratory failure with hypoxemia ([facility] [address] [J96.01]  Acute respiratory failure with hypoxia ([facility] [address] [J96.01]  Pneumonia due to COVID-19 virus [U07.1, J12.89]   Procedure:   9/24 CVC left subclavian - 9/30 removed  6/24 PICC right basilic  67/1 Extubated to bipap  10/6 Reintubated  10/15 extubated     10/20  CT Head  WO Contrast - no evidence of acute intracranial abnormalities. 10/20  CT abdomen pelvis  Almost complete resolution of previously seen airspace infiltrates in the lung bases. No acute abdominal or pelvic abnormalities. Treatment Plan of Care: now Covid negative, TPN continues per PICC, electrolyte replacement protocols, K+ 3.1, Na 147, CO2  22, creatinine 1.8, Hgb 7.5, mason catheter reinserted, strict I/O, fever 100.4 (ax), Tylenol,  remains on room air, sats 96-98%, PT/OT following, SLP, stoma powder and zinc oxide 2x daily prn for rectal lesions, dietitian follows, med nebs, diabetic management, palliative care, ID consulted today, telemetry, GI follows along wit hospitalist, [facility], Venofer IV. Barriers to Discharge: medical readiness    PCP: Juan Luis Staples MD  Readmission Risk Score: 49%  Patient Goals/Plan/Treatment Preferences: planning [facility] ECF; SW following patient.

## 2020-10-21 NOTE — PROGRESS NOTES
Pharmacy Consult       TPN    Ordering Provider: Dr. Lissette Britt    Indication for TPN: Nutrition support, unable to place NGT    Macronutrients   DW: 88kg   AA: 1 g/kg   Total Kcal: 10 Kcal/kg   Lipids: 30 % of Total Kcal   Infusion Rate: 100 mL/hr    Electrolytes (per bag)   Na Acetate:    0 meq   NaCl:         185 meq   NaPhos:       0 mmol     K Acetate:     180 meq   K Phos:      15 mmol   KCl:               0 meq     Calcium Gluc:   0 meq   Magnesium:      16.24 meq      MV:      10 mL   Trace Elements: 0 mL    Electrolyte Replacement: 40 mEq IV KCl, 2 g Mag    Assessment/ Plan:  Remains NPO.

## 2020-10-21 NOTE — PROGRESS NOTES
[facility]  INPATIENT SPEECH THERAPY  STRZ RENAL TELEMETRY 6K  DAILY NOTE    TIME   SLP Individual Minutes  Time In: 7143  Time Out: 5457  Minutes: 22  Timed Code Treatment Minutes: 0 Minutes       Date: 10/21/2020  Patient Name: Jimbo Verdugo      CSN: [id number]   : 1970  (44 y.o.)  Gender: male   Referring Physician: Asim Carreon MD  Diagnosis: Acute respiratory failure with hypoxemia   Secondary Diagnosis: Dysphagia   Precautions: Fall risk, aspiration precautions   Current Diet: NPO  Swallowing Strategies: Strict oral care  Date of Last MBS: Not Applicable    Pain:  No pain reported. Subjective:  Pt seen upright in bed; highly fatigued upon ST entering room for set up of Fiberoptic Endoscopic Evaluation of the Swallow (FEES). Decreased verbalizations and concerns for confused language. Pt with hoarse/rough vocal quality. Hx of multiple intubations/extubations. High concerns for decreased airway protection with inability to r/o silent aspiration with clinical swallowing assessment alone. Therefore, FEES attempted this date; see note for details. Short-Term Goals:  SHORT TERM GOAL #1:  Goal 1: Pt will safely consume PO trials of thin liquids and purees  (and advanced texture trials as deemed clinically indicated) demonstrating consistent pharyngeal swallow trigger and endurance to determine readiness for potential PO diet level initiation. INTERVENTIONS:   ST complete set up of FEES equipment; following set up patient able to achieve upright positioning in bed and demonstrate appropriate alertness. Patient with closing of eyes intermittently; however, easily increased alertness with call of name. Patient initially very resistant to passing of FEES scope as evidence by jerking head back and side to side upon entry of scope. Patient with highly level of confusion and limited command following.   Provided much encouragement and direct 1:1 assistance provided by second [address] [place] Gurmeet Gupta assisting with FEES; patient able to achieve appropriate head control. ST then able to successfully pass chip tip scope through right nares. Upon entry into laryngeal vestibule; patient with severely constricted Velopharyngeal Port (). Patient unable to follow commands to relax with increasing movements of agitation negativly impacting ST ability to  advance scope resulting in ST to terminate exam at this time. Following removal of scope from nares; ST agreeable to PO trials of ice chips at bedside. ST completed x5 PO trials of ice chips via spoon; patient difficult to palpate d/t excess adipose tissue- however, ST able to detect 3/5 swallows initiated following significant delay + MAX cues, 2/5 swallows unable to be detected. Certainly unable to confirm without support of imaging. No s/s of aspiration noted following x5 trials; certainly at high risk for silent aspiration d/t multiple intubations/extubations + additional complex medical history     ST with verbal conversation with Dr. Anika Durbin to follow FEES attempt; Dr. Anika Durbin verbalized understanding and confirmed \"TPN will begin today. \" ST encouraging TPN vs NG at this time d/t inability to pass scope. GI present to follow evaluation also confirming \"Patient with multiple failed NG tube attempts already, not a candidate for PEG tube at this time. \" ST with plan to continue therapeutics trials at bedside with plan to complete repeat FEES 10/23 to determine further safety of PO diet vs ongoing need for alternate means of nutrition. Dr. Anika Durbin in agreement     [facility] #[facility]:  Goal 2: Staff will exhibit return demonstration for implementation of comprehensive oral care procedural analysis and administration of ice chips post oral care to maximize oral integrity and preserve swallow mechanism.   INTERVENTIONS: DNT d/t focus on other goals     SHORT TERM GOAL #3:  Goal 3: Monitor pulmonary status and readiness for potential formal instrumentation; complete MBS/FEES should it become clinically indicated. INTERVENTIONS: Plan repeat FEES Friday 10/23    SHORT TERM GOAL #4:   Goal 4: Complete full ST assessment and add goal as indicated. INTERVENTIONS: Patient with ongoing confused language and severely limited verbal output; patient only able to express \"Holger\"     Long-Term Goals:   No established LTG's given short ELOS        EDUCATION:  Learner: Patient  Education:  Reviewed results and recommendations of this evaluation and Reviewed ST goals and Plan of Care  Evaluation of Education: Needs further instruction, No evidence of learning and Family not present    ASSESSMENT/PLAN:  Activity Tolerance:  Patient tolerance of  treatment: fair. Assessment/Plan: Patient progressing toward established goals. Continues to require skilled care of licensed speech pathologist to progress toward achievement of established goals and plan of care. .     Plan for Next Session: PO trials; Repeat FEES 10/23 *Dr. Jeannine Leyva in verbal agreement       Jose Luis Burrell M.S. [place]

## 2020-10-21 NOTE — PROGRESS NOTES
[facility]         Renal Inpatient Follow-Up note         10/21/2020 9:48 AM    Pt Name:   Jimbo Verdugo  YOB: 1970  Attending: Nga Fontana MD    Chief Complaint : Jimbo Verdugo is a 44 y.o. male being followed by nephrology for acute kidney injury    Interval History :   Patient seen and examined by me. No distress. Awake and alert.   Denies any chest pain or shortness of breath     Scheduled Medications :    hydrocortisone  25 mg Rectal BID    pantoprazole  40 mg Intravenous Daily    potassium chloride  40 mEq Oral Once    [Held by provider] gabapentin  400 mg Oral BID    [Held by provider] modafinil  100 mg Oral Daily    enoxaparin  40 mg Subcutaneous BID    insulin glargine  38 Units Subcutaneous Nightly    insulin lispro  0-12 Units Subcutaneous TID WC    insulin lispro  0-6 Units Subcutaneous Nightly    lidocaine 1 % injection  5 mL Intradermal Once    sodium chloride flush  10 mL Intravenous 2 times per day    magnesium replacement protocol   Other RX Placeholder    phosphorus replacement protocol   Other RX Placeholder    calcium replacement protocol   Other RX Placeholder    [Held by provider] ARIPiprazole  15 mg Oral Daily    aspirin  81 mg Oral Daily    glycopyrrolate-formoterol  2 puff Inhalation BID      IV infusion builder 80 mL/hr at 10/21/20 0210    dextrose         Vitals :  BP (!) 160/82   Pulse 83   Temp 99.8 °F (37.7 °C) (Oral)   Resp 20   Ht 5' 8\" (1.727 m)   Wt (!) 310 lb (140.6 kg)   SpO2 96%   BMI 47.14 kg/m²     24HR INTAKE/OUTPUT:      Intake/Output Summary (Last 24 hours) at 10/21/2020 0948  Last data filed at 10/21/2020 0405  Gross per 24 hour   Intake 2482.61 ml   Output 1975 ml   Net 507.61 ml     Last 3 weights  Wt Readings from Last 3 Encounters:   10/20/20 (!) 310 lb (140.6 kg)   09/15/20 281 lb 6.4 oz (127.6 kg)   08/21/20 (!) 306 lb 6.4 oz (139 kg)           Physical Exam : Due to COVID-19 situation termination is limited exam from outside the room  General Appearance: Obese well-nourished no distress  CNS-confused but some walking slightly more communicative  Psych-not agitated  Abdomen: Appears slightly distended  Musculoskeletal:  Edema -no edema           Last 3 CBC   Recent Labs     10/19/20  0641 10/20/20  0352 10/21/20  0540   WBC 9.5 9.7 8.5   RBC 2.91* 2.71* 2.70*   HGB 8.1* 7.6* 7.5*   HCT 26.9* 25.2* 24.7*   * 495* 457*     Last 3 CMP  Recent Labs     10/19/20  0641 10/20/20  0352 10/21/20  0540   * 148* 147*   K 3.6 3.2* 3.1*   * 115* 112*   CO2 19* 20* 22*   BUN 16 15 12   CREATININE 1.7* 1.8* 1.8*   CALCIUM 10.8* 10.5 10.4   LABALBU 2.9* 2.7* 2.8*   BILITOT 0.4 0.4 0.4             ASSESSMENT / Plan   1 Renal -acute kidney injury most likely due to septic ATN/hypotension  ? Improving with some IV hydration . Currently at 1. 8.   ? Continue IV fluids and monitor renal function  ? Prn diuretics    2 Electrolytes -hypokalemia-start the patient on KCl 40 p.o. twice daily also getting KCl through the IV  3 Hypernatremia-on hypotonic fluids will continue D5W sodium improving currently at 692  4 Metabolic acidosis stable  5 Anemia- S/P post rectal clot. GI on board  6 Hypotension resolved . 7 Hx of diabetes mellitus  8 Hypercalcemia corrected calcium almost 11.5. PTH is appropriately low less than 1.2 vitamin D level is stable at 126 this is most likely hypercalcemia due to immobility if continues to rise might need to consider bisphosphonate. 9 Hx of COVID-19 pneumonia   10 Hx of diastolic dysfunction volume status reasonable closely follow. As needed diuretics  11 Meds reviewed and discussed with the nursing staff     EMELY Sullivan D.  [facility].

## 2020-10-21 NOTE — PROGRESS NOTES
Gastroenterology Progress Note:     Patient Name:  Alfred Neff   MRN: [id number]  [id number]  YOB: 1970  Admit Date: 9/23/2020  9:02 AM  Primary Care Physician: Chantell Belle MD   [id number]     Patient seen and examined. 24 hours events and chart reviewed. Subjective: Patient resting in bed. Does not answer questions or follow commands. Transferred from [facility] yesterday, negative COVID test x 2. ST unable to do FEES test today due to patient inability to cooperate. Hgb 7.5    Objective:  BP (!) 165/86   Pulse 83   Temp 100.4 °F (38 °C) (Axillary)   Resp 20   Ht 5' 8\" (1.727 m)   Wt (!) 310 lb (140.6 kg)   SpO2 98%   BMI 47.14 kg/m²     Physical Exam:    General:  Very acutely ill appearing male. HEENT: Atraumatic, normocephalic. Dry oral mucous membranes. Neck: Supple without adenopathy, JVD, thyromegaly or masses. Trachea midline. CV: Heart RRR, no murmurs, rubs, gallops. Resp: Even, easy without cough or accessory use. Lungs diminished throughout, scattered rhonchi noted. Abd: Round, soft, obese, nontender. No hepatosplenomegaly or mass present. Active bowel sounds heard. No distention noted. Ext:  Without cyanosis, clubbing, edema.    Skin: Pink, warm, dry  Neuro:  Alert, does not answer questions or follow commands    Rectal: Large stage II ulcerations to bilateral gluteal folds, actively bleeding with stool irritation    Labs:   CBC:   Lab Results   Component Value Date    WBC 8.5 10/21/2020    HGB 7.5 10/21/2020    HCT 24.7 10/21/2020    MCV 91.5 10/21/2020     10/21/2020     BMP:   Lab Results   Component Value Date     10/21/2020    K 3.1 10/21/2020    K 4.6 09/07/2020     10/21/2020    CO2 22 10/21/2020    PHOS 3.8 10/15/2019    BUN 12 10/21/2020    CREATININE 1.8 10/21/2020    CALCIUM 10.4 10/21/2020     PT/INR:   Lab Results   Component Value Date    PROTIME 24.4 06/27/2018    INR 1.17 09/23/2020     Lipids:   Lab Results Component Value Date    ALKPHOS 81 10/21/2020    ALT 15 10/21/2020    AST 18 10/21/2020    BILITOT 0.4 10/21/2020    BILIDIR 0.6 09/24/2020    LABALBU 2.8 10/21/2020    LABALBU 4.4 01/26/2012    LIPASE 34.9 08/05/2020      Ref. Range 10/19/2020 18:00   Ferritin Latest Ref Range: 22 - 322 ng/mL 207   Iron Latest Ref Range: 65 - 195 ug/dL 20 (L)   Iron Saturation Latest Ref Range: 20 - 50 % 13 (L)   TIBC Latest Ref Range: 171 - 450 ug/dL 153 (L)   Folate Latest Ref Range: 4.8 - 24.2 ng/mL > 20.0   Vitamin B-12 Latest Ref Range: 211 - 911 pg/mL 1327 (H)   Immature Retic Fract Latest Ref Range: 2.3 - 13.4 % 33.1 (H)   Retic Ct Abs Latest Ref Range: 0.5 - 2.0 % 4.5 (H)   Absolute Retic # Latest Ref Range: 20.0 - 115.0 thou/mm3 149.0 (H)   Retic Hemoglobin Latest Ref Range: 28.2 - 35.7 pg 30.5     Significant Diagnostic Studies:   CT Abdomen/pelvis WO contrast 10/20/20       Impression    1. Almost complete resolution of previously seen airspace infiltrates in the lung bases. 2. No acute abdominal or pelvic abnormalities. CT head 10/20/20       Impression     No evidence of acute intracranial abnormality.       Current Meds:  Scheduled Meds:   potassium chloride  20 mEq Intravenous every 2 hours    hydrocortisone  25 mg Rectal BID    pantoprazole  40 mg Intravenous Daily    potassium chloride  40 mEq Oral Once    [Held by provider] gabapentin  400 mg Oral BID    [Held by provider] modafinil  100 mg Oral Daily    enoxaparin  40 mg Subcutaneous BID    insulin glargine  38 Units Subcutaneous Nightly    insulin lispro  0-12 Units Subcutaneous TID WC    insulin lispro  0-6 Units Subcutaneous Nightly    lidocaine 1 % injection  5 mL Intradermal Once    sodium chloride flush  10 mL Intravenous 2 times per day    magnesium replacement protocol   Other RX Placeholder    phosphorus replacement protocol   Other RX Placeholder    calcium replacement protocol   Other RX Placeholder    [Held by provider] ARIPiprazole  15 mg Oral Daily    aspirin  81 mg Oral Daily    glycopyrrolate-formoterol  2 puff Inhalation BID     Continuous Infusions:   IV infusion builder 80 mL/hr at 10/21/20 0210    dextrose         Assessment:  45 yo M admitted 09/23/20 for fatigue. Recently admitted 09/06/20 for almost 10 days with hypoxemic respiratory failure secondary to COVID-19. Received Decadron and Danazol. Complained of worsening SOB & progressive hypoxia for which he was intubated. Bronchoscopy 09/27/20. Extubated 10/02/20. 10/06/20 became more obtunded & progressive respiratory failure with acute oliguric renal failure, was re-intubated. Hypotensive requiring pressor support, UO declined. He had a rectal tube for a few weeks which was over-inflated, when it was deflated & removed, he was found to have oozing of bright red blood from the rectum, therefore GI was consulted 10/12/20. It was recommended to hold anticoagulants, keep the rectal tube out, and supportive care. Started on Zyvox. Extubated and now on BiPAP during the day. [facility] from [facility] and will need to follow outpatient with a different GI practice. 1. Acute rectal bleeding- secondary to gluteal/rectal ulcerations  2. Acute hypoxemic respiratory failure secondary to #3  3. COVID-19 positive  4. Pneumonia  5. Sepsis secondary to #3 & 4  6. KRISTY   7. Hypotension  8. Acute on chronic anemia- stable, s/p 2 units PRBC  9. DM  10. H/O HTN  11. KIARA  12. Diastolic heart failure   13. Iron deficiency  14. Acute diarrhea  15.  Very acutely ill patient    Plan:     Monitor H & H, transfuse prn   PO PPI daily   Nursing to monitor stool output   Stop Anusol suppositories   Stool for cdiff- never collected   IV Venofer once   PO iron supplementation tomorrow   ID consulted per primary for fever, requested RN have ID evaluate gluteal/rectal wounds   Wound/ostomy consult to eval & treat   Electrolyte management per nephrology  Kody Guillaume [facility] on

## 2020-10-21 NOTE — PROGRESS NOTES
Wound ostomy consulted for kash rectal lesions. Wound ostomy already on case. Already treatment orders in place for stoma powder and zinc oxide twice daily and as needed. Continue treatment plan.

## 2020-10-21 NOTE — PROGRESS NOTES
[address] [address]  INPATIENT PHYSICAL THERAPY  DAILY NOTE  [facility] RENAL TELEMETRY 6K - 6K-25/025-A   Partial Co-tx with OT due to medical complexity/acuity  Time In: 1331  Time Out: 1412  Timed Code Treatment Minutes: 41 Minutes  Minutes: 41          Date: 10/21/2020  Patient Name: Don Colin,  Gender:  male        MRN: [id number]  : 1970  (44 y.o.)     Referring Practitioner: Em Holden. Deb Bautista MD  Diagnosis: acute respiratory failure with hypoxemia  Additional Pertinent Hx: Pt is a 43 yo male with a history of morbid obesity, DM type II, ETOH abuse, hyperlipidemia, KIARA, and non-alcoholic steatohepatitis. He has been in the hospital since 2020 with admitting diagnosis of COVID-19. With this diagnosis he also had bacterial infiltrates. Pt has been intubated and extubated twice during this admission, and his current diagnosis is acute respiratory failure with hypoxemia. He is now COVID negative. Prior Level of Function:  Lives With: Alone  Type of Home: Apartment        Additional Comments: PLOF is unknown. Pt did state he lives alone in an apartment, but due to his difficulty communicating, unable to obtain more information. Restrictions/Precautions:  Restrictions/Precautions: General Precautions, Fall Risk    SUBJECTIVE: RN approved PT treatment. Pt sitting in bed, with a nurse in room upon PT arrival. Pt is more alert than yesterday's session. He is able to participate more in conversation. Pt states he has pain in his R knee that is chronic.      PAIN: pain in R knee with AAROM, pain with rolling (not stated where: likely in buttocks where pressure wounds are present)    OBJECTIVE:  Bed Mobility:  Rolling to Left: Dependent, with increased time for completion, X 2+ assist (OT & PT for roll, 2 STNAs changing bed sheets)   Rolling to Right: Dependent, with increased time for completion, X 2+ assist (OT & PT for roll, 2 STNAs changing bed sheets)    Supine to Sit: Dependent, X 2, with head of bed raised, x2 assist at trunk, x1 assist for LEs  Sit to Supine: Dependent, X 2, with head of bed raised   Scooting: Dependent, X 2, 3 assist--but likely could complete with x2 assist    Transfers:  Not Tested    Balance:  Sat EOB x ~8+ minutes to improve sitting tolerance and attempt dynamic movements with UEs (see OT note) and LEs. Utilized UEs for sitting balance. Static Sitting Balance:  Candel[place] x 1 <-->MaxA x 1, variable with fatigue  Dynamic Sitting Balance: Maximum Assistance, X 1, OT behind pt for trunk support; PT cueing LE movements   Attempted to give PT \"high five\"--unable to lift his R arm very high, but did lift it off bed    Exercise:  Patient was guided in 1 set(s) 10 reps of exercise to both lower extremities. Heelslides and Hip abduction/adduction. Exercises were completed for increased independence with functional mobility. AAROM for exercises. Pt with at least 1/5 strength in all LE muscle groups. No movement against gravity. Pt did bend both knees today independently (range very limited) and wiggled toes on R foot (not on L)    Functional Outcome Measures: Completed  -PAC Inpatient Mobility Raw Score : 6  AM-PAC Inpatient T-Scale Score : 23.55    ASSESSMENT:  Assessment: Patient progressing toward established goals. Pt making gains in ROM and in strength. Still very limited and unable to lift LEs against gravity, but he is improving. Activity Tolerance:  Patient tolerance of  treatment: fair. After sitting EOB x ~8min, pt requested to return to bed due to fatigue. Assist level was also increasing to max at this time. Equipment Recommendations:Equipment Needed: (unknown at this time; will likely need some type of DME)  Discharge Recommendations: Subacute/Skilled Nursing Facility, Continue to assess pending progress Pt will benefit from continued PT at [facility].    Plan: Times per week: 5x GM  Times per day: Daily  Specific instructions for Next Treatment: co-tx with OT; attempt EOB sitting so pt can improve balance and postural awareness and progress to more advanced activity  Current Treatment Recommendations: Strengthening, Functional Mobility Training, ROM, Transfer Training, Balance Training, ADL/Self-care Training, Endurance Training, Patient/Caregiver Education & Training, Positioning, Safety Education & Training, Cognitive/Perceptual Training, Neuromuscular Re-education    Patient Education  Patient Education: Plan of Care, Home Exercise Program, [employer] Mobility, Transfers, Reviewed Prior Education,  - Patient Verbalized Understanding, - Patient Requires Continued Education    Goals:  Patient goals : discharge from hospital  Short term goals  Time Frame for Short term goals: by hospital discharge  Short term goal 1: Roll to L and to R with Min Ax 1 for ease of bed mobility  Short term goal 2: PT to assess supine to sit transfer, and transfers beyond this when able  Long term goals  Time Frame for Long term goals : N/A due to short ELOS    Following session, patient left in safe position with all fall risk precautions in place, call light in reach.     Pawan Vaughan, PT, DPT

## 2020-10-22 LAB
ALBUMIN SERPL-MCNC: 3 G/DL (ref 3.5–5.1)
ALP BLD-CCNC: 81 U/L (ref 38–126)
ALT SERPL-CCNC: 15 U/L (ref 11–66)
ANION GAP SERPL CALCULATED.3IONS-SCNC: 10 MEQ/L (ref 8–16)
AST SERPL-CCNC: 18 U/L (ref 5–40)
BASOPHILS # BLD: 0.6 %
BASOPHILS ABSOLUTE: 0.1 THOU/MM3 (ref 0–0.1)
BILIRUB SERPL-MCNC: 0.4 MG/DL (ref 0.3–1.2)
BUN BLDV-MCNC: 12 MG/DL (ref 7–22)
CALCIUM IONIZED: 1.39 MMOL/L (ref 1.12–1.32)
CALCIUM SERPL-MCNC: 10.2 MG/DL (ref 8.5–10.5)
CHLORIDE BLD-SCNC: 109 MEQ/L (ref 98–111)
CO2: 25 MEQ/L (ref 23–33)
CREAT SERPL-MCNC: 1.8 MG/DL (ref 0.4–1.2)
EOSINOPHIL # BLD: 2.9 %
EOSINOPHILS ABSOLUTE: 0.3 THOU/MM3 (ref 0–0.4)
ERYTHROCYTE [DISTWIDTH] IN BLOOD BY AUTOMATED COUNT: 18.4 % (ref 11.5–14.5)
ERYTHROCYTE [DISTWIDTH] IN BLOOD BY AUTOMATED COUNT: 59.7 FL (ref 35–45)
GFR SERPL CREATININE-BSD FRML MDRD: 48 ML/MIN/1.73M2
GLUCOSE BLD-MCNC: 150 MG/DL (ref 70–108)
GLUCOSE BLD-MCNC: 155 MG/DL (ref 70–108)
GLUCOSE BLD-MCNC: 155 MG/DL (ref 70–108)
GLUCOSE BLD-MCNC: 157 MG/DL (ref 70–108)
GLUCOSE BLD-MCNC: 158 MG/DL (ref 70–108)
HCT VFR BLD CALC: 25.6 % (ref 42–52)
HEMOGLOBIN: 8.1 GM/DL (ref 14–18)
IMMATURE GRANS (ABS): 0.2 THOU/MM3 (ref 0–0.07)
IMMATURE GRANULOCYTES: 1.8 %
LYMPHOCYTES # BLD: 12 %
LYMPHOCYTES ABSOLUTE: 1.3 THOU/MM3 (ref 1–4.8)
MAGNESIUM: 1.8 MG/DL (ref 1.6–2.4)
MCH RBC QN AUTO: 28.5 PG (ref 26–33)
MCHC RBC AUTO-ENTMCNC: 31.6 GM/DL (ref 32.2–35.5)
MCV RBC AUTO: 90.1 FL (ref 80–94)
MONOCYTES # BLD: 6.9 %
MONOCYTES ABSOLUTE: 0.8 THOU/MM3 (ref 0.4–1.3)
NUCLEATED RED BLOOD CELLS: 0 /100 WBC
PHOSPHORUS: 1.6 MG/DL (ref 2.4–4.7)
PLATELET # BLD: 506 THOU/MM3 (ref 130–400)
PMV BLD AUTO: 10.6 FL (ref 9.4–12.4)
POTASSIUM SERPL-SCNC: 3.6 MEQ/L (ref 3.5–5.2)
RBC # BLD: 2.84 MILL/MM3 (ref 4.7–6.1)
SEG NEUTROPHILS: 75.8 %
SEGMENTED NEUTROPHILS ABSOLUTE COUNT: 8.3 THOU/MM3 (ref 1.8–7.7)
SODIUM BLD-SCNC: 144 MEQ/L (ref 135–145)
TOTAL PROTEIN: 6.5 G/DL (ref 6.1–8)
WBC # BLD: 11 THOU/MM3 (ref 4.8–10.8)

## 2020-10-22 PROCEDURE — 84100 ASSAY OF PHOSPHORUS: CPT

## 2020-10-22 PROCEDURE — 99232 SBSQ HOSP IP/OBS MODERATE 35: CPT | Performed by: FAMILY MEDICINE

## 2020-10-22 PROCEDURE — 99232 SBSQ HOSP IP/OBS MODERATE 35: CPT | Performed by: INTERNAL MEDICINE

## 2020-10-22 PROCEDURE — 83735 ASSAY OF MAGNESIUM: CPT

## 2020-10-22 PROCEDURE — 2580000003 HC RX 258: Performed by: STUDENT IN AN ORGANIZED HEALTH CARE EDUCATION/TRAINING PROGRAM

## 2020-10-22 PROCEDURE — 2580000003 HC RX 258: Performed by: PHARMACIST

## 2020-10-22 PROCEDURE — 94760 N-INVAS EAR/PLS OXIMETRY 1: CPT

## 2020-10-22 PROCEDURE — 94640 AIRWAY INHALATION TREATMENT: CPT

## 2020-10-22 PROCEDURE — 2500000003 HC RX 250 WO HCPCS: Performed by: PHARMACIST

## 2020-10-22 PROCEDURE — 2580000003 HC RX 258: Performed by: NURSE PRACTITIONER

## 2020-10-22 PROCEDURE — 82330 ASSAY OF CALCIUM: CPT

## 2020-10-22 PROCEDURE — 36415 COLL VENOUS BLD VENIPUNCTURE: CPT

## 2020-10-22 PROCEDURE — 87147 CULTURE TYPE IMMUNOLOGIC: CPT

## 2020-10-22 PROCEDURE — 80053 COMPREHEN METABOLIC PANEL: CPT

## 2020-10-22 PROCEDURE — 97530 THERAPEUTIC ACTIVITIES: CPT

## 2020-10-22 PROCEDURE — 97110 THERAPEUTIC EXERCISES: CPT

## 2020-10-22 PROCEDURE — 85025 COMPLETE CBC W/AUTO DIFF WBC: CPT

## 2020-10-22 PROCEDURE — 87040 BLOOD CULTURE FOR BACTERIA: CPT

## 2020-10-22 PROCEDURE — 82948 REAGENT STRIP/BLOOD GLUCOSE: CPT

## 2020-10-22 PROCEDURE — 1200000003 HC TELEMETRY R&B

## 2020-10-22 PROCEDURE — 6360000002 HC RX W HCPCS: Performed by: STUDENT IN AN ORGANIZED HEALTH CARE EDUCATION/TRAINING PROGRAM

## 2020-10-22 PROCEDURE — 92526 ORAL FUNCTION THERAPY: CPT

## 2020-10-22 PROCEDURE — 87801 DETECT AGNT MULT DNA AMPLI: CPT

## 2020-10-22 PROCEDURE — 6360000002 HC RX W HCPCS: Performed by: INTERNAL MEDICINE

## 2020-10-22 PROCEDURE — 6360000002 HC RX W HCPCS: Performed by: PHYSICIAN ASSISTANT

## 2020-10-22 RX ORDER — AMLODIPINE BESYLATE 5 MG/1
5 TABLET ORAL DAILY
Status: DISCONTINUED | OUTPATIENT
Start: 2020-10-22 | End: 2020-10-22

## 2020-10-22 RX ORDER — HYDRALAZINE HYDROCHLORIDE 20 MG/ML
10 INJECTION INTRAMUSCULAR; INTRAVENOUS EVERY 6 HOURS PRN
Status: DISCONTINUED | OUTPATIENT
Start: 2020-10-22 | End: 2020-10-22

## 2020-10-22 RX ORDER — HYDRALAZINE HYDROCHLORIDE 20 MG/ML
10 INJECTION INTRAMUSCULAR; INTRAVENOUS EVERY 4 HOURS PRN
Status: DISCONTINUED | OUTPATIENT
Start: 2020-10-22 | End: 2020-11-06 | Stop reason: HOSPADM

## 2020-10-22 RX ADMIN — GLYCOPYRROLATE AND FORMOTEROL FUMARATE 2 PUFF: 9; 4.8 AEROSOL, METERED RESPIRATORY (INHALATION) at 18:09

## 2020-10-22 RX ADMIN — INSULIN LISPRO 2 UNITS: 100 INJECTION, SOLUTION INTRAVENOUS; SUBCUTANEOUS at 16:40

## 2020-10-22 RX ADMIN — PIPERACILLIN AND TAZOBACTAM 3.38 G: 3; .375 INJECTION, POWDER, LYOPHILIZED, FOR SOLUTION INTRAVENOUS at 18:00

## 2020-10-22 RX ADMIN — INSULIN LISPRO 2 UNITS: 100 INJECTION, SOLUTION INTRAVENOUS; SUBCUTANEOUS at 12:48

## 2020-10-22 RX ADMIN — PIPERACILLIN AND TAZOBACTAM 3.38 G: 3; .375 INJECTION, POWDER, LYOPHILIZED, FOR SOLUTION INTRAVENOUS at 02:11

## 2020-10-22 RX ADMIN — POTASSIUM PHOSPHATE, MONOBASIC AND POTASSIUM PHOSPHATE, DIBASIC 21 MMOL: 224; 236 INJECTION, SOLUTION, CONCENTRATE INTRAVENOUS at 12:48

## 2020-10-22 RX ADMIN — ENOXAPARIN SODIUM 40 MG: 40 INJECTION SUBCUTANEOUS at 21:13

## 2020-10-22 RX ADMIN — PIPERACILLIN AND TAZOBACTAM 3.38 G: 3; .375 INJECTION, POWDER, LYOPHILIZED, FOR SOLUTION INTRAVENOUS at 09:22

## 2020-10-22 RX ADMIN — SODIUM CHLORIDE, PRESERVATIVE FREE 10 ML: 5 INJECTION INTRAVENOUS at 21:13

## 2020-10-22 RX ADMIN — GLYCOPYRROLATE AND FORMOTEROL FUMARATE 2 PUFF: 9; 4.8 AEROSOL, METERED RESPIRATORY (INHALATION) at 08:04

## 2020-10-22 RX ADMIN — SODIUM CHLORIDE: 234 INJECTION INTRAMUSCULAR; INTRAVENOUS; SUBCUTANEOUS at 16:39

## 2020-10-22 RX ADMIN — ENOXAPARIN SODIUM 40 MG: 40 INJECTION SUBCUTANEOUS at 09:20

## 2020-10-22 RX ADMIN — HYDRALAZINE HYDROCHLORIDE 10 MG: 20 INJECTION, SOLUTION INTRAMUSCULAR; INTRAVENOUS at 04:44

## 2020-10-22 ASSESSMENT — PAIN SCALES - WONG BAKER
WONGBAKER_NUMERICALRESPONSE: 0

## 2020-10-22 ASSESSMENT — PAIN SCALES - GENERAL: PAINLEVEL_OUTOF10: 0

## 2020-10-22 NOTE — PROGRESS NOTES
Comprehensive Nutrition Assessment    Type and Reason for Visit:  Reassess    Nutrition Recommendations/Plan:   Recommend no macronutrient TPN changes tonight as phosphorus level low, refeeding syndrome risk, discussed with pharmacist.  Further diet recommendations per SLP. Nutrition Assessment:    Pt. with only minimal improvement from a nutritional standpoint AEB TPN initiated last night but unable to advance kcals due to low phosphorus, refeeding syndrome risk. Remains at risk for further nutritional compromise r/t continued NPO status, dysphagia, unable to place NGT, catabolic illness, increased nutrient needs to support wound healing, lengthy hospitalization, and underlying medical condition (covid-19, acute respiratory failure, hypoxia, intubation-extubated 10/15/20, sepsis due to covid pneumonia, KRISTY, anemia, GI bleed (lg clots/ulceration at site of rectum), deconditioning, history of obesity, DM, GERD, alcohol abuse, HLD, HTN, vitamin D deficiency), and need for nutrition support. Nutrition recommendations/interventions as per above. Malnutrition Assessment:  Malnutrition Status: At risk for malnutrition (Comment)(no nutrition in ~5 days)    Context:  Acute Illness     Findings of the 6 clinical characteristics of malnutrition:  Energy Intake:  7 - 50% or less of estimated energy requirements for 5 or more days  Weight Loss:  Unable to assess(weight fluctuations with edema)     Body Fat Loss:  No significant body fat loss     Muscle Mass Loss:  No significant muscle mass loss    Fluid Accumulation:  (+1 and +2 edema) Extremities   Strength:  Not Performed    Estimated Daily Nutrient Needs:  Energy (kcal):  0694-1812 (30-32 kcals/kg IBW in active late phase);  Weight Used for Energy Requirements:  Ideal(70 kg - ideal weight)     Protein (g):  ~140 gms (2/kgm IBW) as renal status allows; Weight Used for Protein Requirements:  Ideal(70 kg)        Fluid (ml/day):  per MD;     Nutrition Related Findings:  Recent covid, now testing negative, lengthy hosptial course. Extubated 10/15/20. SLP following, recommending NPO, planning FEES testing 10/23/20 if pt cooperative in completing. Unable to place NGT d/t pt being uncooperative. Pt seen earlier this morning he was sleeping. He has not been responding to questions asked by staff. TPN was started 10/21/20, currently running at 100 ml/hr. Sodium 144, Potassium 3.6, BUN 12, Creatinine 1.8, Glucose 155, Phosphorus 1.6, Magnesium 1.8. Zosyn, humalog, iron, protonix, provigil, lantus, glycolax. 2 BM recorded in the last 24 hours.       Wounds:  (perineum unstageable-pressure, scotum skin tear)       Current Nutrition Therapies:    Current Parenteral Nutrition Orders:  · Type and Formula: 3-in-1 Custom(PICC line)   · Lipids: Daily  · Duration: Continuous  · Rate/Volume: 100 ml/hr  · Current PN Order Provides: 88 kg to dose, 10 kcals/kg/day, 30% lipid kcals, 1 gram protein/kg/day~ 880 kcals, 88 grams protein, 78 grams CHO, 26 grams lipid per 24 hours    Anthropometric Measures:  · Height: 5' 8\" (172.7 cm)  · Current Body Weight: 310 lb (140.6 kg)(10/20, bedscale with +1 and +2 edema)   · Admission Body Weight: 285 lb (129.3 kg)(9/23/20, stated, +1 BLE and BUE edema)    · Usual Body Weight: 306 lb (138.8 kg)(EMR, 6/29/20, actual.  299# 8/15/20, bedscale.)     · Ideal Body Weight: 154 lbs;   · BMI: 47.1  · Adjusted Body Weight:  ; (193# (88 kg) adjusted for TPN)   · BMI Categories: Obese Class 3 (BMI 40.0 or greater)       Nutrition Diagnosis:   · Inadequate oral intake related to cognitive or neurological impairment, swallowing difficulty as evidenced by NPO or clear liquid status due to medical condition, nutrition support - parenteral nutrition      Nutrition Interventions:   Food and/or Nutrient Delivery:  Continue NPO, Continue Current Parenteral Nutrition  Nutrition Education/Counseling:  No recommendation at this time   Coordination of Nutrition Care: Continued Inpatient Monitoring    Goals:  Patient will receive PN to meet 75% or more of estimated nutrient needs until able to initiate EN or oral diet. Nutrition Monitoring and Evaluation:   Behavioral-Environmental Outcomes:  (n/a)   Food/Nutrient Intake Outcomes:  Diet Advancement/Tolerance, Parenteral Nutrition Intake/Tolerance  Physical Signs/Symptoms Outcomes:  Biochemical Data, Chewing or Swallowing, GI Status, Fluid Status or Edema, Nutrition Focused Physical Findings, Skin, Weight     Discharge Planning:     Too soon to determine     Electronically signed by Sumit Valencia, KURT, LD on 10/22/20 at 10:43 AM EDT    Contact: [phone]

## 2020-10-22 NOTE — CARE COORDINATION
10/22/20, 2:00 PM EDT    DISCHARGE ONGOING EVALUATION:     Claudetta Byers day: 29  Location: [address] [place] [address] Reason for admit: Acute respiratory failure with hypoxemia ([facility] [address] [J96.01]  Acute respiratory failure with hypoxia ([facility] [address] [J96.01]  Pneumonia due to COVID-19 virus [U07.1, J12.89]   Treatment Plan of Care: Tmax 100R. Creat 1.8, ical 1.39, phos 1.6. ID plans to continue IV Zosyn another 5-7 days. GI signed off. Has IV ATB, TPN, DM management, duonebs, lyte replacement. Working minimally with PT/OT. Barriers to Discharge: not medically ready   PCP: Hilda Servin MD  Readmission Risk Score: 55%  Patient Goals/Plan/Treatment Preferences: Spoke with Dr. Stephane Velasco, she is requesting Hubbard evaluation. Message sent to Faye Benz liaison, she will forward order to [facility]. SW also following for referral to [facility]. *Patient meets Hubbard criteria, [facility] will initiate precert. [employer] message sent to Dr. Stephane Velasco. Lizbeth RN updated.  Electronically signed by Sunday Wolfe RN on 10/22/2020 at 2:56 PM

## 2020-10-22 NOTE — PROGRESS NOTES
[address] [address]  INPATIENT SPEECH THERAPY  [facility] RENAL TELEMETRY 6K  DAILY NOTE    TIME   SLP Individual Minutes  Time In: 8259  Time Out: 3285  Minutes: 11  Timed Code Treatment Minutes: 0 Minutes       Date: 10/22/2020  Patient Name: Timo Gracia      CSN: [id number]   : 1970  (44 y.o.)  Gender: male   Referring Physician: Giorgio Stoner MD  Diagnosis: Acute respiratory failure with hypoxemia   Secondary Diagnosis: Dysphagia   Precautions: Fall risk, aspiration precautions   Current Diet: NPO - TPN  Swallowing Strategies: Strict oral care  Date of Last MBS: Not Applicable    Pain:  No pain reported. Subjective:  Pt seen upright in bed; highly fatigued upon ST entering room required MAX cues to participate within therapeutic PO trials. RN reports, \"slightly improved cognitive status this date compared to yesterday. \"  Patient with poor cooperation overall this date; question ability to successfully participate in planned repeat FEES next date. SIERRA Collins reports, \"potential plans for patient to transition to [place]. \" Patient continues with TPN nutrition. Short-Term Goals:  SHORT TERM GOAL #1:  Goal 1: Pt will safely consume PO trials of thin liquids and purees  (and advanced texture trials as deemed clinically indicated) demonstrating consistent pharyngeal swallow trigger and endurance to determine readiness for potential PO diet level initiation. INTERVENTIONS:   Patient required direct 1:1 max assist to achieve upright postioning; despite max effort patient with limited achievement of upright positioning d/t poor postural support and high level of deconditioning. Patient with cough x1 prior to any PO trials. Patient completed PO trials of ice chips x5; oral holding of the bolus with delayed swallow; however, patient difficult to palpate d/t excess adipose tissue. Patient with cough to follow ice chip trials.  Patient completed PO trials of applesauce x1; patient with poor acceptance of material and shook head \"no\" to \"Do you like applesauce? \"  Additional PO trials of thin liquids via spoon; delayed swallow, no s/s of aspiration. ST attempted to completed oral care to follow PO trials to ensure clearance of PO trials; however, patient with immediate closing of lips and shaking head \"no. \" SIERRA Corado also confirms patient refusing oral care with lips closed. ST questioning readiness for repeat FEES study next date; ST to plan to check with nursing and patient prior to attempting repeat FEES. SIERRA Corado also reports, \"potential plan to transfer to [place] pending pre-cert. \"       Previous tx: ST complete set up of FEES equipment; following set up patient able to achieve upright positioning in bed and demonstrate appropriate alertness. Patient with closing of eyes intermittently; however, easily increased alertness with call of name. Patient initially very resistant to passing of FEES scope as evidence by jerking head back and side to side upon entry of scope. Patient with highly level of confusion and limited command following. Provided much encouragement and direct 1:1 assistance provided by second SLP Milana Chirinos assisting with FEES; patient able to achieve appropriate head control. ST then able to successfully pass chip tip scope through right nares. Upon entry into laryngeal vestibule; patient with severely constricted Velopharyngeal Port (). Patient unable to follow commands to relax with increasing movements of agitation negativly impacting ST ability to  advance scope resulting in ST to terminate exam at this time. Following removal of scope from nares; ST agreeable to PO trials of ice chips at bedside. ST completed x5 PO trials of ice chips via spoon; patient difficult to palpate d/t excess adipose tissue- however, ST able to detect 3/5 swallows initiated following significant delay + MAX cues, 2/5 swallows unable to be detected.  Certainly unable to confirm without support of imaging. No s/s of aspiration noted following x5 trials; certainly at high risk for silent aspiration d/t multiple intubations/extubations + additional complex medical history     ST with verbal conversation with Dr. Omid Barton to follow FEES attempt; Dr. Omid Barton verbalized understanding and confirmed \"TPN will begin today. \" ST encouraging TPN vs NG at this time d/t inability to pass scope. GI present to follow evaluation also confirming \"Patient with multiple failed NG tube attempts already, not a candidate for PEG tube at this time. \" ST with plan to continue therapeutics trials at bedside with plan to complete repeat FEES 10/23 to determine further safety of PO diet vs ongoing need for alternate means of nutrition. Dr. Omid Barton in agreement     [facility] 1277 #2:  Goal 2: Staff will exhibit return demonstration for implementation of comprehensive oral care procedural analysis and administration of ice chips post oral care to maximize oral integrity and preserve swallow mechanism. INTERVENTIONS: DNT d/t focus on other goals     SHORT TERM GOAL #3:  Goal 3: Monitor pulmonary status and readiness for potential formal instrumentation; complete MBS/FEES should it become clinically indicated. INTERVENTIONS: Plan repeat FEES Friday 10/23    SHORT TERM GOAL #4:   Goal 4: Complete full ST assessment and add goal as indicated. INTERVENTIONS: Patient with ongoing confused language and severely limited verbal output; patient only able to express \"Holger\"     Long-Term Goals:   No established LTG's given short ELOS        EDUCATION:  Learner: Patient  Education:  Reviewed results and recommendations of this evaluation and Reviewed ST goals and Plan of Care  Evaluation of Education: Needs further instruction, No evidence of learning and Family not present    ASSESSMENT/PLAN:  Activity Tolerance:  Patient tolerance of  treatment: fair. Assessment/Plan: Patient progressing toward established goals.   Continues to require skilled care of licensed speech pathologist to progress toward achievement of established goals and plan of care. .     Plan for Next Session: PO trials; Repeat FEES 10/23 *Dr. Yasmine Medellin in verbal agreement       ARINA Hensley 23

## 2020-10-22 NOTE — PROGRESS NOTES
1030  Phone call made to patient's father, Jose Agrawal at [phone]. Tereso Sever states that he is the patient's biological father. Tereso Sever indicates that the patient's mother is . Tereso Sever states that the patient has 2 adult children: Deb Omalley, 33 yo [phone] and Angela Mccray but no contact information provided as Carina Ling is incarcerated. Tereso Sever states that the patient left [place] when he and his wife / and he left his children at a young age. Tereso Sever states that the children are estranged from the patient. Tereso Sever states that he has been making the medical decisions up to this point for the patient. Discussed with Tereso Sever that this RN will call the patient's daughter to verify if she would wish to make the patient's medical decisions or would wish to defer it to Tereso Sever. Dr. Mike Russell is available and provided a medical update via phone to Tereso Sever. After updated by Dr. Mike Russell, this RN spoke with Tereso Sever. Tereso Sever states that Ana Mariabrandi Gilmans is a  and he will text her this RN's phone number so that she can call at her convenience. Did briefly discuss code status and at this time, Tereso Sever would like to maintain a full code status for the patient. Much emotional support provided. Page Kaplan RN.    7470  Patient's daughter, Ana Maria Ag called this RN and informs this RN that she would prefer not to make the medical decisions for the patient and would rather have Jose Agrawal, patient's father, to make the medical decisions for the patient. This was also witnessed via phone by primary RN, More Robles.

## 2020-10-22 NOTE — PLAN OF CARE
Problem: Nutrition  Goal: Optimal nutrition therapy  Outcome: Ongoing   Nutrition Problem #1: Inadequate oral intake  Intervention: Food and/or Nutrient Delivery: Continue NPO, Continue Current Parenteral Nutrition  Nutritional Goals: Patient will receive PN to meet 75% or more of estimated nutrient needs until able to initiate EN or oral diet.

## 2020-10-22 NOTE — PROGRESS NOTES
[address] [address]  INPATIENT PHYSICAL THERAPY  DAILY NOTE  [facility] RENAL TELEMETRY 6K - 6K-25/025-A  Assist from 2nd PT due to no available techs on 6K during tx time. Recommend co-tx with OT when possible to maximize pt outcome and it is likely that pt will not tolerate two therapy sessions in one day at this time. Time In: 1319  Time Out: 1407  Timed Code Treatment Minutes: 48 Minutes  Minutes: 48        Date: 10/22/2020  Patient Name: Joy Lee,  Gender:  male        MRN: [id number]  : 1970  (44 y.o.)     Referring Practitioner: Mar Virk. Stephen Leung MD  Diagnosis: acute respiratory failure with hypoxemia  Additional Pertinent Hx: Pt is a 43 yo male with a history of morbid obesity, DM type II, ETOH abuse, hyperlipidemia, KIARA, and non-alcoholic steatohepatitis. He has been in the hospital since 2020 with admitting diagnosis of COVID-19. With this diagnosis he also had bacterial infiltrates. Pt has been intubated and extubated twice during this admission, and his current diagnosis is acute respiratory failure with hypoxemia. He is now COVID negative. Prior Level of Function:  Lives With: Alone  Type of Home: Apartment        Additional Comments: PLOF is unknown. Pt did state he lives alone in an apartment, but due to his difficulty communicating, unable to obtain more information. Restrictions/Precautions:  Restrictions/Precautions: General Precautions, Fall Risk    SUBJECTIVE: RN approved PT treatment and states pt appears to be doing better today vs yesterday. Pt is more alert initially than yesterday, and has more independent movements of LEs (unsure if they are intentional or spontaneous movements, but appear to be intentional). PT initially without a second assist; was going to terminate session following AAROM due to needing 2 assist, but pt requested \"I want to do more. \" PT found a 2nd PT to assist with supine to EOB transfer. PAIN: Pain in R LE with R LE movement. OBJECTIVE:  Bed Mobility:  Rolling to Left: Dependent, X 2   Rolling to Right: Dependent, X 2   Supine to Sit: Maximum Assistance, X 2, pt attempting to reach with UE to assist with transfer, but very little ability to do so at this point; 2 assist to bring up trunk and 1 for LEs  Sit to Supine: Maximum Assistance, X 2, with increased time for completion   Scooting: Dependent, X 2 in supine  Pt does not tolerate supine well and appears to have difficulty breathing when in supine. Kept HOB at at least 30 deg for majority of session, only lowering to supine when scooting and for roll. Transfers:  Not Tested; pt too fatigued following sitting EOB    Balance:  Static Sitting Balance:  Max to attain position; Min for most of seated EOB duration  Dynamic Sitting Balance: Moderate Assistance, X 1, with brief moments of Max A   PT sat EOB x ~8 min. Reaching with R UE to give \"high five\" and attempt to touch nose. Pt able to reach hand from resting on bed to just above umbilicus. Pt made effort at scooting forward using B UEs. R hand remained in fist. PT extended fingers and rested pt's hand flat on bed to encourage WB through hand in more functional position. Exercise:  Patient was guided in 1 set(s) 10 reps of exercise to both lower extremities. Glut sets, Heelslides, Hip abduction/adduction and Hooklying hip abduction/adduction. Exercises were completed for increased independence with functional mobility. All exercises were AAROM with pt doing ~10% of the total effort. Functional Outcome Measures: Completed  -PAC Inpatient Mobility Raw Score : 6  AM-PAC Inpatient T-Scale Score : 23.55    ASSESSMENT:  Assessment: Pt making slow progress towards goals. More movement observed in LEs that appears intentional. Sitting tolerance has not yet increased, but due to his length of being in bed critically ill, this is expected. Pt also more alert and responsive to PT.   Activity Tolerance:  Patient tolerance of treatment: fair. Fatigued after sitting EOB, and becoming weaker at that point. Equipment Recommendations:Equipment Needed: (unknown at this time; will likely need some type of DME)  Discharge Recommendations:  [address], Continue to assess pending progress or [place], depending on medical needs. Plan: Times per week: 5x GM  Times per day: Daily  Specific instructions for Next Treatment: co-tx with OT; attempt EOB sitting so pt can improve balance and postural awareness and progress to more advanced activity  Current Treatment Recommendations: Strengthening, Functional Mobility Training, ROM, Transfer Training, Balance Training, ADL/Self-care Training, Endurance Training, Patient/Caregiver Education & Training, Positioning, Safety Education & Training, Cognitive/Perceptual Training, Neuromuscular Re-education    Patient Education  Patient Education: [employer], Transfers, Reviewed Prior Education, Verbal Exercise Instruction    Goals:  Patient goals : discharge from hospital  Short term goals  Time Frame for Short term goals: by hospital discharge  Short term goal 1: Roll to L and to R with Min Ax 1 for ease of bed mobility  Short term goal 2: PT to assess supine to sit transfer, and transfers beyond this when able  Long term goals  Time Frame for Long term goals : N/A due to short ELOS    Following session, patient left in safe position with all fall risk precautions in place.     Nate Daniel, PT, DPT

## 2020-10-22 NOTE — PROGRESS NOTES
PROGRESS NOTE      Patient:  Emeka Strange      Unit/Bed:6-25/025-A    YOB: 1970    MRN: [id number]       Acct: [de-identified]     PCP: Faye Gonsalves MD    Date of Admission: 9/23/2020      Assessment/Plan:        Active Hospital Problems    Diagnosis Date Noted    Sepsis due to COVID-19 ([address] [U07.1, A41.89]     KRISTY (acute kidney injury) ([address] [N17.9]     Hypernatremia [E87.0]     Acute encephalopathy [G93.40]     Acute on chronic anemia [D64.9]     Hypomagnesemia [E83.42]     Dysphagia [R13.10]     Diarrhea [R19.7]     At risk for malnutrition [Z91.89]     Reactive thrombocytosis [R79.89]     Pneumonia due to COVID-19 virus [U07.1, J12.89] 10/18/2020    ARF (acute renal failure) with tubular necrosis (HCC) [N17.0]     Hypokalemia [E87.6]     Other hypotension [I95.89]     Acute respiratory failure with hypoxia ([address] [J96.01]     Acute respiratory failure due to COVID-19 ([address] [U07.1, J96.00] 09/06/2020    Essential hypertension [I10]        Sepsis due to Covid pneumonia    -initially resolved, now having intermittent fever again and today has leukocytosis, WBC 11 from 8.5 yesterday.  -Complicated medical course. Recent discharge from [address] [place] [address] for COVID-19 on 9/15/2020.   -Readmitted for COVID-19 pneumonia on 9/23/2020, intubation on 9/23/2020 and 10/6/2020. Successfully weaned to room air currently.   -Has been treated with vancomycin, doxycycline (10/6-10/14), Zosyn (10/6-10/16) throughout his hospital stay. Likely MRSA pneumonia.   -Per ICU notes patient would be a good candidate for Zyvox, however due to patient's inability to swallow he has been unable to receive Zyvox. -Previously had been afebrile however patient febrile to 100.4 on 10/21/2020. Afebrile with low grade at 100.0 rectally 10/22/2020.   -Currently no respiratory symptoms. Has progressed well from a respiratory standpoint.   -Due to recurrent fever ID has been consulted.   Possible source of infection due to fails FEES on Friday, need to discuss with POA    10. Diarrhea     -c diff test ordered by GI, but not done     11. At risk for malnutrition    -dietician pn-board, appreciate input     12. Sarahi-rectal lesion    -wound ostomy on-board, appreciate input. Cont zinc oxide as ordered. 13. Hx of KIARA  -Noncompliant with CPAP at home  -Pulmonology consulted  -BiPAP ordered    14. Diabetes mellitus type 2  -Blood sugars within goal range of 140-180  -Continue Lantus at current dose and sliding scale insulin  -Accu-Cheks q. 4 and at bedtime  -Hypoglycemia protocol in place    15. Essential hypertension  -Uncontrolled today due to pt not receiving PO meds ( on amlodipine, cardizem and hydralazine PO at home)  -IV hydralazine prn ordered   -Avoid hypotension with recent sepsis and renal failure    16. Physical Deconditioning  Patient likely will need SNF versus LTAC at discharge. Faye evaluation consult ordered  -Palliative care following in discussed case with    17. Thrombocytosis, likely reactive due to recent COVID-19    -CBC in am         Anticipated Discharge in : TBD pending progress        Chief Complaint: Shortness of breath    Hospital Course:  47-year-old morbidly obese black male lifetime non-smoker. Patient has a history of morbid obesity associated with type 2 diabetes mellitus, prior alcohol abuse, hyperlipidemia, hypertension, hypogonadism, nonalcoholic steatohepatitis, obstructive sleep apnea, and gout. Patient was hospitalized 9/6/2020 through 9/15/2020 with hypoxemic respiratory failure secondary to COVID-19 associated with diffuse bilateral infiltrates. At that time, patient received Decadron, remdesivir, and danazol. During hospitalization, he had issues with atrial fibrillation. His insulin therapy required adjustment. He was discharged home on subcutaneous Lovenox. Patient returned back to the emergency room on 9/23/2020 with increasing SOB and progressive hypoxia.  CXR showed diffuse overnight. ID recommending IV Zosyn for 5 to 7 days. Continue zinc oxide for rectal wound.        Medications:  Reviewed    Infusion Medications    PN-Adult  3 IN 1 Central Line (Custom)      PN-Adult  3 IN 1 Central Line (Custom) 100 mL/hr at 10/21/20 1729    dextrose       Scheduled Medications    potassium phosphate IVPB  21 mmol Intravenous Once    ferrous sulfate  325 mg Oral BID WC    pantoprazole  40 mg Oral QAM AC    insulin lispro  0-12 Units Subcutaneous Q6H    piperacillin-tazobactam  3.375 g Intravenous Q8H    [Held by provider] potassium bicarb-citric acid  40 mEq Oral BID    potassium chloride  40 mEq Oral Once    [Held by provider] gabapentin  400 mg Oral BID    [Held by provider] modafinil  100 mg Oral Daily    enoxaparin  40 mg Subcutaneous BID    insulin glargine  38 Units Subcutaneous Nightly    lidocaine 1 % injection  5 mL Intradermal Once    sodium chloride flush  10 mL Intravenous 2 times per day    magnesium replacement protocol   Other RX Placeholder    phosphorus replacement protocol   Other RX Placeholder    calcium replacement protocol   Other RX Placeholder    [Held by provider] ARIPiprazole  15 mg Oral Daily    aspirin  81 mg Oral Daily    glycopyrrolate-formoterol  2 puff Inhalation BID     PRN Meds: hydrALAZINE, acetaminophen, potassium chloride **OR** potassium alternative oral replacement **OR** potassium chloride, potassium chloride, sodium chloride flush, lidocaine, acetaminophen **OR** [DISCONTINUED] acetaminophen, polyethylene glycol, promethazine **OR** ondansetron, albuterol, glucose, dextrose, glucagon (rDNA), dextrose      Intake/Output Summary (Last 24 hours) at 10/22/2020 1626  Last data filed at 10/22/2020 1400  Gross per 24 hour   Intake 3576.54 ml   Output 5950 ml   Net -2373.46 ml       Diet:  PN-Adult  3 IN 1 Central Line (Custom)  PN-Adult  3 IN 1 Central Line (Custom)    Exam:  BP (!) 184/92   Pulse 85   Temp 98.7 °F (37.1 °C) (Axillary)   Resp 18   Ht 5' 8\" 10/06/2020    SPECGRAV >=1.030 10/06/2020    GLUCOSEU NEGATIVE 08/05/2020       Radiology:  CT ABDOMEN PELVIS WO CONTRAST Additional Contrast? None   Final Result   1. Almost complete resolution of previously seen airspace infiltrates in the lung bases. 2. No acute abdominal or pelvic abnormalities. **This report has been created using voice recognition software. It may contain minor errors which are inherent in voice recognition technology. **      Final report electronically signed by Dr. Mynor King on 10/20/2020 10:50 AM      CT HEAD WO CONTRAST   Final Result    No evidence of acute intracranial abnormality. **This report has been created using voice recognition software. It may contain minor errors which are inherent in voice recognition technology. **      Final report electronically signed by Dr. Yon Morales MD on 10/20/2020 10:13 AM      XR CHEST PORTABLE   Final Result   Ill-defined bilateral lung opacities. Follow-up as clinically indicated. This document has been electronically signed by: Nixon Douglas MD on 10/20/2020 05:38 AM         XR CHEST PORTABLE   Final Result   Minimal residual atelectasis and/or infiltrate within the bilateral mid    and lower lungs with improvement. Improved pulmonary inflation. This document has been electronically signed by: Abhijeet Osborn MD on    10/16/2020 02:02 AM         XR CHEST PORTABLE   Final Result   Impression:   Progressive bilateral consolidations or ARDS. This document has been electronically signed by: Harley Yousif MD on    10/12/2020 04:59 AM         XR CHEST PORTABLE   Final Result    Similar bilateral ill-defined pneumonia. This document has been electronically signed by: Shannon Jack. Ave Rogers DO on    10/11/2020 09:49 AM         XR CHEST PORTABLE   Final Result   Similar diffuse patchy bilateral airspace disease and consolidations. Support devices as above.       This document has been electronically signed by: Briana Kolb MD on    10/10/2020 04:35 AM         XR CHEST PORTABLE   Final Result   No acute findings. This document has been electronically signed by: Zara Ruiz MD on 10/08/2020 07:40 AM         CT ABDOMEN PELVIS WO CONTRAST Additional Contrast? Oral   Final Result    IMPRESSION:   Bilateral lower lobe pneumonia. Known Covid. Continued progress imaging is advised   Distended colon with fluid, air and stool. Correlate for diarrhea. **This report has been created using voice recognition software. It may contain minor errors which are inherent in voice recognition technology. **      Final report electronically signed by Dr. Gi Rae on 10/7/2020 6:49 PM      XR CHEST PORTABLE   Final Result   Bilateral lung consolidation not significant change. This document has been electronically signed by: Zara Ruiz MD on 10/07/2020 07:25 AM         US RENAL COMPLETE   Final Result   Unremarkable kidneys. This document has been electronically signed by: Christina Weaver MD on    10/07/2020 01:48 AM         XR CHEST PORTABLE   Final Result   1. There is a new endotracheal tube with the distal tip 1.8 cm above the level of the madi. 2. There is a new esophageal tube with the distal tip projecting over the gastric fundus. 3. There is a stable right PICC with the distal tip projecting over the right atrium. 4. The lung volumes are diminished. There are bilateral perihilar and the basilar opacities which are similar to the previous examination however may be accentuated by the expiratory technique. There is no pleural effusion. Follow-up chest radiographs    are recommended to confirm complete resolution. **This report has been created using voice recognition software. It may contain minor errors which are inherent in voice recognition technology. **      Final report electronically signed by Dr. Sadaf De La Torre on 10/6/2020 7:18 AM      XR CHEST PORTABLE   Final Result   Bilateral lung opacities. Follow-up to clearing recommended. This document has been electronically signed by: Robert Melendez MD on 10/06/2020 06:09 AM         XR CHEST PORTABLE   Final Result   Slightly decreased diffuse patchy bilateral airspace disease. Support devices as above. This document has been electronically signed by: Ashley Anderson MD on    10/03/2020 05:15 AM         XR CHEST PORTABLE   Final Result   ET tube should be retracted 1.5-2.0 cm. No significant change in coarse scattered bilateral infiltrates. This document has been electronically signed by: Domingo Marquez MD on    09/30/2020 06:50 AM         XR CHEST PORTABLE   Final Result      Slightly improving bilateral pneumonia. **This report has been created using voice recognition software. It may contain minor errors which are inherent in voice recognition technology. **      Final report electronically signed by Dr. Ramandeep Reynoso on 9/27/2020 2:23 PM      XR ABDOMEN FOR NG/OG/NE TUBE PLACEMENT   Final Result   Esophageal route tube tip in the stomach. **This report has been created using voice recognition software. It may contain minor errors which are inherent in voice recognition technology. **      Final report electronically signed by Dr Enriqueta Tolbert on 9/26/2020 10:22 AM      XR CHEST PORTABLE   Final Result   1. Lines and tubes as above. 2. Worsening bilateral pneumonia. **This report has been created using voice recognition software. It may contain minor errors which are inherent in voice recognition technology. **      Final report electronically signed by Dr. Ramandeep Reynoso on 9/24/2020 7:38 AM      XR CHEST PORTABLE   Final Result   1. Endotracheal tube terminates 3.1 cm above the madi. 2.  Orogastric tube in the stomach. 3.  Patchy opacities in the right upper lobe and lingula.       This document has been electronically signed by: Pravin Thorne MD on    09/24/2020 12:35 AM         XR CHEST PORTABLE   Final Result   1. Bilateral pulmonary opacification worse than on previous study dated 10 September 2020. This may represent worsening inflammatory process, possibly Covid 19 infection. Please correlate clinically   2. Borderline cardiomegaly. **This report has been created using voice recognition software. It may contain minor errors which are inherent in voice recognition technology. **      Final report electronically signed by DR Rommel Mukherjee on 9/23/2020 10:47 AM          Diet: PN-Adult  3 IN 1 Central Line (Custom)  PN-Adult  3 IN 1 Central Line (Custom)    DVT prophylaxis: [x] Lovenox                                 [] SCDs                                 [] SQ Heparin                                 [] Encourage ambulation           [] Already on Anticoagulation     Disposition:    [] Home       [] TCU       [] Rehab       [] Psych       [x] SNF       [] [place]       [] Other-    Code Status: Full Code    PT/OT Eval Status:  Following      Electronically signed by Radha Banerjee DO on 10/22/2020 at 4:26 PM

## 2020-10-22 NOTE — PLAN OF CARE
Problem: Impaired respiratory status  Goal: Clear lung sounds  10/22/2020 0807 by Myra Velez RCP  Outcome: Ongoing  Note: maintenance  10/21/2020 2147 by Sae Khan RCP  Outcome: Ongoing  Note: Treatment will be continued as ordered to improve aeration.

## 2020-10-22 NOTE — PROGRESS NOTES
glargine  38 Units Subcutaneous Nightly    lidocaine 1 % injection  5 mL Intradermal Once    sodium chloride flush  10 mL Intravenous 2 times per day    magnesium replacement protocol   Other RX Placeholder    phosphorus replacement protocol   Other RX Placeholder    calcium replacement protocol   Other RX Placeholder    [Held by provider] ARIPiprazole  15 mg Oral Daily    aspirin  81 mg Oral Daily    glycopyrrolate-formoterol  2 puff Inhalation BID      PN-Adult  3 IN 1 Central Line (Custom)      PN-Adult  3 IN 1 Central Line (Custom) 100 mL/hr at 10/21/20 1729    dextrose       hydrALAZINE, acetaminophen, potassium chloride **OR** potassium alternative oral replacement **OR** potassium chloride, potassium chloride, sodium chloride flush, lidocaine, acetaminophen **OR** [DISCONTINUED] acetaminophen, polyethylene glycol, promethazine **OR** ondansetron, albuterol, glucose, dextrose, glucagon (rDNA), dextrose      LABS:     CBC:   Recent Labs     10/20/20  0352 10/21/20  0540 10/22/20  0652   WBC 9.7 8.5 11.0*   HGB 7.6* 7.5* 8.1*   * 457* 506*     BMP:    Recent Labs     10/20/20  0352 10/21/20  0540 10/22/20  0652   * 147* 144   K 3.2* 3.1* 3.6   * 112* 109   CO2 20* 22* 25   BUN 15 12 12   CREATININE 1.8* 1.8* 1.8*   GLUCOSE 138* 139* 155*     Calcium:  Recent Labs     10/22/20  0652   CALCIUM 10.2     Ionized Calcium:No results for input(s): IONCA in the last 72 hours. Magnesium:  Recent Labs     10/22/20  0652   MG 1.8     Phosphorus:  Recent Labs     10/22/20  0652   PHOS 1.6*     BNP:No results for input(s): BNP in the last 72 hours. Glucose:  Recent Labs     10/22/20  0145 10/22/20  0648 10/22/20  1202   POCGLU 150* 158* 157*     HgbA1C: No results for input(s): LABA1C in the last 72 hours. INR: No results for input(s): INR in the last 72 hours.   Hepatic:   Recent Labs     10/20/20  0352 10/21/20  0540 10/22/20  0652   ALKPHOS 78 81 81   ALT 12 15 15   AST 13 18 18   PROT 6.3 6.3 6.5   BILITOT 0.4 0.4 0.4   LABALBU 2.7* 2.8* 3.0*     Amylase and Lipase:No results for input(s): LACTA, AMYLASE in the last 72 hours. Lactic Acid: No results for input(s): LACTA in the last 72 hours. Troponin: No results for input(s): CKTOTAL, CKMB, TROPONINI in the last 72 hours. BNP: No results for input(s): BNP in the last 72 hours. CULTURES:   UA: No results for input(s): SPECGRAV, PHUR, COLORU, CLARITYU, MUCUS, PROTEINU, BLOODU, RBCUA, WBCUA, BACTERIA, NITRU, GLUCOSEU, BILIRUBINUR, UROBILINOGEN, KETUA, LABCAST, LABCASTTY, AMORPHOS in the last 72 hours. Invalid input(s): CRYSTALS  Micro:   Lab Results   Component Value Date    BC No growth-preliminary No growth  09/24/2020          Problem list of patient:     Patient Active Problem List   Diagnosis Code    Chronic diastolic congestive heart failure (HCC) I50.32    Atrial fibrillation ([facility]) I48.91    BPH (benign prostatic hyperplasia) N40.0    Carpal tunnel syndrome on right G56.01    Chronic gout M1A. 9XX0    DM2 (diabetes mellitus, type 2) ([facility] E11.9    Heart failure with preserved ejection fraction (HCC) I50.30    History of alcohol abuse F10.11    History of osteomyelitis Z87.39    Essential hypertension I10    Hypogonadism, male E29.1    Major depression F32.9    Morbid obesity ([facility]) E66.01    DURAN (nonalcoholic steatohepatitis) K75.81    KIARA (obstructive sleep apnea) G47.33    Vitamin D deficiency E55.9    Normocytic anemia D64.9    Physical deconditioning R53.81    Mood disorder ([facility]) F39    Hallucinations R44.3    GERD (gastroesophageal reflux disease) K21.9    Wound of buttock S31.809A    Chest wall pain with tenderness R07.89    Primary osteoarthritis of left hip M16.12    COVID-19 U07.1    Acute respiratory failure due to COVID-19 ([facility]) U07.1, J96.00    Acute respiratory failure with hypoxia ([facility]) J96.01    ARF (acute renal failure) with tubular necrosis ([facility]) N17.0    Hypokalemia E87.6    Other hypotension I95.89    Pneumonia due to COVID-19 virus U07.1, J12.89    Sepsis due to COVID-19 (HCC) U07.1, A41.89    KRISTY (acute kidney injury) ([facility] N17.9    Hypernatremia E87.0    Acute encephalopathy G93.40    Acute on chronic anemia D64.9    Hypomagnesemia E83.42    Dysphagia R13.10    Diarrhea R19.7    At risk for malnutrition Z91.89    Reactive thrombocytosis R79.89         ASSESSMENT/PLAN   Extremely decondioned following COVID -19 related infection  Ulceration of the rectum due to flexiseal contributing to bleeding: no rectal tube or rectal thermometer  Obesity  Will ask [facility] evaluation  Continue current antibiotic: 5-7 days      Josr López MD, FACP 10/22/2020 1:06 PM

## 2020-10-22 NOTE — PLAN OF CARE
Problem: Impaired respiratory status  Goal: Clear lung sounds  Outcome: Ongoing  Note: Treatment will be continued as ordered to improve aeration.

## 2020-10-22 NOTE — PROGRESS NOTES
Rubia RN and this RN on phone with Bebo Tobias to do two nurse verification. Patient's daughter (next of kin), verified that she would like to pass medical decision making to Holger's father, Guillaume Jeimy.

## 2020-10-22 NOTE — PROGRESS NOTES
[facility]         Renal Inpatient Follow-Up note         10/22/2020 9:21 AM    Pt Name:   Theresa Cowan  YOB: 1970  Attending: Mellissa Duffy MD    Chief Complaint : Theresa Cowan is a 44 y.o. male being followed by nephrology for acute kidney injury    Interval History :   Patient seen and examined by me. No distress. Awake and alert. Denies any chest pain or shortness of breath  Resting comfortably.      Scheduled Medications :    ferrous sulfate  325 mg Oral BID WC    pantoprazole  40 mg Oral QAM AC    insulin lispro  0-12 Units Subcutaneous Q6H    piperacillin-tazobactam  3.375 g Intravenous Q8H    potassium bicarb-citric acid  40 mEq Oral BID    potassium chloride  40 mEq Oral Once    [Held by provider] gabapentin  400 mg Oral BID    [Held by provider] modafinil  100 mg Oral Daily    enoxaparin  40 mg Subcutaneous BID    insulin glargine  38 Units Subcutaneous Nightly    lidocaine 1 % injection  5 mL Intradermal Once    sodium chloride flush  10 mL Intravenous 2 times per day    magnesium replacement protocol   Other RX Placeholder    phosphorus replacement protocol   Other RX Placeholder    calcium replacement protocol   Other RX Placeholder    [Held by provider] ARIPiprazole  15 mg Oral Daily    aspirin  81 mg Oral Daily    glycopyrrolate-formoterol  2 puff Inhalation BID      PN-Adult  3 IN 1 Central Line (Custom) 100 mL/hr at 10/21/20 1729    dextrose         Vitals :  BP (!) 170/77   Pulse 85   Temp 97.5 °F (36.4 °C) (Axillary)   Resp 30   Ht 5' 8\" (1.727 m)   Wt (!) 310 lb (140.6 kg)   SpO2 98%   BMI 47.14 kg/m²     24HR INTAKE/OUTPUT:      Intake/Output Summary (Last 24 hours) at 10/22/2020 0921  Last data filed at 10/22/2020 0835  Gross per 24 hour   Intake 2043.43 ml   Output 6150 ml   Net -4106.57 ml     Last 3 weights  Wt Readings from Last 3 Encounters:   10/20/20 (!) 310 lb (140.6 kg)   09/15/20 281 lb 6.4 oz (127.6 kg)   08/21/20 (!) 306 lb 6.4 oz (139 kg)           Physical Exam : Due to COVID-19 situation termination is limited exam from outside the room  General Appearance: Obese well-nourished no distress  CNS-confused but some walking slightly more communicative  Psych-not agitated  Abdomen: Appears slightly distended  Musculoskeletal:  Edema -no edema           Last 3 CBC   Recent Labs     10/20/20  0352 10/21/20  0540 10/22/20  0652   WBC 9.7 8.5 11.0*   RBC 2.71* 2.70* 2.84*   HGB 7.6* 7.5* 8.1*   HCT 25.2* 24.7* 25.6*   * 457* 506*     Last 3 CMP  Recent Labs     10/20/20  0352 10/21/20  0540 10/22/20  0652   * 147* 144   K 3.2* 3.1* 3.6   * 112* 109   CO2 20* 22* 25   BUN 15 12 12   CREATININE 1.8* 1.8* 1.8*   CALCIUM 10.5 10.4 10.2   LABALBU 2.7* 2.8* 3.0*   BILITOT 0.4 0.4 0.4             ASSESSMENT / Plan   1 Renal -acute kidney injury most likely due to septic ATN/hypotension  ? Improving with some IV hydration . Currently at 1. 8.   ? May be developing a new baseline. ? As needed diuretics    2 Electrolytes -hypokalemia-doing better continue KCl 40 twice a day also getting a lot of KCl through the TPN follow potassium closely  3 Hypernatremia-doing well follow for now  4 Metabolic acidosis stable  5 Anemia- S/P post rectal clot. GI on board stable  6 Hypotension resolved . Now in fact running higher. Follow at this time. 7 Hx of diabetes mellitus  8 Hypercalcemia-improving follow for now no need for any bisphosphonate this is hypercalcemia of immobility. Corrected calcium is around 11 improving  9 Hx of COVID-19 pneumonia   10 Hx of diastolic dysfunction volume status reasonable closely follow. As needed diuretics  11 Meds reviewed      EMELY Laughlin D.  [facility].

## 2020-10-22 NOTE — PLAN OF CARE
Problem: Impaired respiratory status  Goal: Patient will achieve/maintain normal respiratory rate/effort  Outcome: Ongoing     Problem: Impaired respiratory status  Goal: Clear lung sounds  10/22/2020 1813 by Yasmeen Frye  Outcome: Ongoing

## 2020-10-22 NOTE — PROGRESS NOTES
Gastroenterology Progress Note:     Patient Name:  Leonie Montiel   MRN: [id number]  [id number]  YOB: 1970  Admit Date: 9/23/2020  9:02 AM  Primary Care Physician: Jericho Franklin MD   [id number]     Patient seen and examined. 24 hours events and chart reviewed. Subjective: Patient resting in bed. He continues to be drowsy. He did shakes his head no that he was not in pain. Objective:  BP (!) 161/85   Pulse 84   Temp 100 °F (37.8 °C) (Rectal)   Resp 20   Ht 5' 8\" (1.727 m)   Wt (!) 310 lb (140.6 kg)   SpO2 98%   BMI 47.14 kg/m²     Physical Exam:    General:  Very acutely ill appearing male  HEENT: Atraumatic, normocephalic. Moist oral mucous membranes. Neck: Supple without adenopathy, JVD, thyromegaly or masses. Trachea midline. CV: Heart RRR, no murmurs, rubs, gallops. Resp: Even, easy without cough or accessory use. Lungs clear to ascultation bilaterally. Abd: Round, soft, nontender. No hepatosplenomegaly or mass present. Active bowel sounds heard. No distention noted. Ext:  Without cyanosis, clubbing, edema. Skin: Pink, warm, dry  Neuro:  Alert, oriented x 3 with no obvious deficits.        Rectal: deferred    Labs:   CBC:   Lab Results   Component Value Date    WBC 11.0 10/22/2020    HGB 8.1 10/22/2020    HCT 25.6 10/22/2020    MCV 90.1 10/22/2020     10/22/2020     BMP:   Lab Results   Component Value Date     10/22/2020    K 3.6 10/22/2020    K 4.6 09/07/2020     10/22/2020    CO2 25 10/22/2020    PHOS 1.6 10/22/2020    BUN 12 10/22/2020    CREATININE 1.8 10/22/2020    CALCIUM 10.2 10/22/2020     PT/INR:   Lab Results   Component Value Date    PROTIME 24.4 06/27/2018    INR 1.17 09/23/2020     Lipids:   Lab Results   Component Value Date    ALKPHOS 81 10/22/2020    ALT 15 10/22/2020    AST 18 10/22/2020    BILITOT 0.4 10/22/2020    BILIDIR 0.6 09/24/2020    LABALBU 3.0 10/22/2020    LABALBU 4.4 01/26/2012    LIPASE 34.9 08/05/2020 Current Meds:  Scheduled Meds:   ferrous sulfate  325 mg Oral BID WC    pantoprazole  40 mg Oral QAM AC    insulin lispro  0-12 Units Subcutaneous Q6H    piperacillin-tazobactam  3.375 g Intravenous Q8H    potassium bicarb-citric acid  40 mEq Oral BID    potassium chloride  40 mEq Oral Once    [Held by provider] gabapentin  400 mg Oral BID    [Held by provider] modafinil  100 mg Oral Daily    enoxaparin  40 mg Subcutaneous BID    insulin glargine  38 Units Subcutaneous Nightly    lidocaine 1 % injection  5 mL Intradermal Once    sodium chloride flush  10 mL Intravenous 2 times per day    magnesium replacement protocol   Other RX Placeholder    phosphorus replacement protocol   Other RX Placeholder    calcium replacement protocol   Other RX Placeholder    [Held by provider] ARIPiprazole  15 mg Oral Daily    aspirin  81 mg Oral Daily    glycopyrrolate-formoterol  2 puff Inhalation BID     Continuous Infusions:   PN-Adult  3 IN 1 Central Line (Custom) 100 mL/hr at 10/21/20 1729    dextrose         Assessment:  43 yo M admitted 09/23/20 for fatigue. Recently admitted 09/06/20 for almost 10 days with hypoxemic respiratory failure secondary to COVID-19. Received Decadron and Danazol. Complained of worsening SOB & progressive hypoxia for which he was intubated. Bronchoscopy 09/27/20. Extubated 10/02/20. 10/06/20 became more obtunded & progressive respiratory failure with acute oliguric renal failure, was re-intubated. Hypotensive requiring pressor support, UO declined. He had a rectal tube for a few weeks which was over-inflated, when it was deflated & removed, he was found to have oozing of bright red blood from the rectum, therefore GI was consulted 10/12/20. It was recommended to hold anticoagulants, keep the rectal tube out, and supportive care. Started on Zyvox.  Extubated and now on BiPAP during the day. Patient has been terminated from [facility] and will need to follow outpatient with a different GI practice. 1. Acute rectal bleeding- secondary to gluteal/rectal ulcerations  2. Acute hypoxemic respiratory failure secondary to #3  3. COVID-19 positive  4. Pneumonia  5. Sepsis secondary to #3 & 4  6. KRISTY   7. Hypotension  8. Acute on chronic anemia- stable, s/p 2 units PRBC  9. DM  10. H/O HTN  11. KIARA  12. Diastolic heart failure   13. Iron deficiency  14. Acute diarrhea  15.  Very acutely ill patient     Plan:    · Monitor H & H, transfuse prn  · PO PPI daily  · Nursing to monitor stool output  · Stool for cdiff- never collected  · PO Iron when patient is able to safely have PO intake  · Wound & ostomy on board  · Electrolyte management per nephrology  · Nephrology & ID on board  · ST following, will attempt FEES later this week  · Case discussed at length with hospitalist resident  · Supportive care per primary team  GI signing off    Case reviewed and impression/plan reviewed in collaboration with Dr. Abigail Pantoja  Electronically signed by SANDRA Issa CNP on 10/22/2020 at 11:17 AM    [facility]

## 2020-10-23 ENCOUNTER — APPOINTMENT (OUTPATIENT)
Dept: GENERAL RADIOLOGY | Age: 50
DRG: 870 | End: 2020-10-23
Payer: MEDICARE

## 2020-10-23 LAB
ALBUMIN SERPL-MCNC: 3.2 G/DL (ref 3.5–5.1)
ALLEN TEST: POSITIVE
ALP BLD-CCNC: 85 U/L (ref 38–126)
ALT SERPL-CCNC: 14 U/L (ref 11–66)
ANION GAP SERPL CALCULATED.3IONS-SCNC: 13 MEQ/L (ref 8–16)
AST SERPL-CCNC: 20 U/L (ref 5–40)
BASE EXCESS (CALCULATED): 1.7 MMOL/L (ref -2.5–2.5)
BASOPHILS # BLD: 0.2 %
BASOPHILS ABSOLUTE: 0 THOU/MM3 (ref 0–0.1)
BILIRUB SERPL-MCNC: 0.6 MG/DL (ref 0.3–1.2)
BUN BLDV-MCNC: 17 MG/DL (ref 7–22)
CALCIUM IONIZED: 1.28 MMOL/L (ref 1.12–1.32)
CALCIUM SERPL-MCNC: 10.8 MG/DL (ref 8.5–10.5)
CHLORIDE BLD-SCNC: 108 MEQ/L (ref 98–111)
CO2: 25 MEQ/L (ref 23–33)
COLLECTED BY:: ABNORMAL
CREAT SERPL-MCNC: 1.7 MG/DL (ref 0.4–1.2)
DEVICE: ABNORMAL
EOSINOPHIL # BLD: 1.2 %
EOSINOPHILS ABSOLUTE: 0.2 THOU/MM3 (ref 0–0.4)
ERYTHROCYTE [DISTWIDTH] IN BLOOD BY AUTOMATED COUNT: 18.7 % (ref 11.5–14.5)
ERYTHROCYTE [DISTWIDTH] IN BLOOD BY AUTOMATED COUNT: 61.8 FL (ref 35–45)
GFR SERPL CREATININE-BSD FRML MDRD: 51 ML/MIN/1.73M2
GLUCOSE BLD-MCNC: 128 MG/DL (ref 70–108)
GLUCOSE BLD-MCNC: 144 MG/DL (ref 70–108)
GLUCOSE BLD-MCNC: 144 MG/DL (ref 70–108)
GLUCOSE BLD-MCNC: 192 MG/DL (ref 70–108)
GLUCOSE BLD-MCNC: 207 MG/DL (ref 70–108)
GLUCOSE, WHOLE BLOOD: 163 MG/DL (ref 70–108)
HCO3: 26 MMOL/L (ref 23–28)
HCT VFR BLD CALC: 28.5 % (ref 42–52)
HEMOGLOBIN: 8.6 GM/DL (ref 14–18)
IMMATURE GRANS (ABS): 0.11 THOU/MM3 (ref 0–0.07)
IMMATURE GRANULOCYTES: 0.7 %
LACTIC ACID, SEPSIS: 1.4 MMOL/L (ref 0.5–1.9)
LACTIC ACID, SEPSIS: 1.4 MMOL/L (ref 0.5–1.9)
LACTIC ACID, SEPSIS: 1.5 MMOL/L (ref 0.5–1.9)
LYMPHOCYTES # BLD: 3.4 %
LYMPHOCYTES ABSOLUTE: 0.5 THOU/MM3 (ref 1–4.8)
MAGNESIUM: 1.7 MG/DL (ref 1.6–2.4)
MCH RBC QN AUTO: 27.9 PG (ref 26–33)
MCHC RBC AUTO-ENTMCNC: 30.2 GM/DL (ref 32.2–35.5)
MCV RBC AUTO: 92.5 FL (ref 80–94)
MONOCYTES # BLD: 2.8 %
MONOCYTES ABSOLUTE: 0.4 THOU/MM3 (ref 0.4–1.3)
NUCLEATED RED BLOOD CELLS: 0 /100 WBC
O2 SATURATION: 94 %
PCO2: 38 MMHG (ref 35–45)
PH BLOOD GAS: 7.45 (ref 7.35–7.45)
PHOSPHORUS: 2.8 MG/DL (ref 2.4–4.7)
PLATELET # BLD: 469 THOU/MM3 (ref 130–400)
PMV BLD AUTO: 10.7 FL (ref 9.4–12.4)
PO2: 67 MMHG (ref 71–104)
POTASSIUM SERPL-SCNC: 4.3 MEQ/L (ref 3.5–5.2)
PROCALCITONIN: 4.58 NG/ML (ref 0.01–0.09)
RBC # BLD: 3.08 MILL/MM3 (ref 4.7–6.1)
SEG NEUTROPHILS: 91.7 %
SEGMENTED NEUTROPHILS ABSOLUTE COUNT: 13.8 THOU/MM3 (ref 1.8–7.7)
SODIUM BLD-SCNC: 146 MEQ/L (ref 135–145)
SOURCE, BLOOD GAS: ABNORMAL
TOTAL PROTEIN: 6.9 G/DL (ref 6.1–8)
WBC # BLD: 15.1 THOU/MM3 (ref 4.8–10.8)

## 2020-10-23 PROCEDURE — 85025 COMPLETE CBC W/AUTO DIFF WBC: CPT

## 2020-10-23 PROCEDURE — 2580000003 HC RX 258: Performed by: FAMILY MEDICINE

## 2020-10-23 PROCEDURE — 6360000002 HC RX W HCPCS: Performed by: STUDENT IN AN ORGANIZED HEALTH CARE EDUCATION/TRAINING PROGRAM

## 2020-10-23 PROCEDURE — 82803 BLOOD GASES ANY COMBINATION: CPT

## 2020-10-23 PROCEDURE — 6360000002 HC RX W HCPCS: Performed by: NURSE PRACTITIONER

## 2020-10-23 PROCEDURE — 80053 COMPREHEN METABOLIC PANEL: CPT

## 2020-10-23 PROCEDURE — 94660 CPAP INITIATION&MGMT: CPT

## 2020-10-23 PROCEDURE — 2580000003 HC RX 258: Performed by: STUDENT IN AN ORGANIZED HEALTH CARE EDUCATION/TRAINING PROGRAM

## 2020-10-23 PROCEDURE — 84100 ASSAY OF PHOSPHORUS: CPT

## 2020-10-23 PROCEDURE — 36600 WITHDRAWAL OF ARTERIAL BLOOD: CPT

## 2020-10-23 PROCEDURE — 99232 SBSQ HOSP IP/OBS MODERATE 35: CPT | Performed by: FAMILY MEDICINE

## 2020-10-23 PROCEDURE — 84145 PROCALCITONIN (PCT): CPT

## 2020-10-23 PROCEDURE — 6360000002 HC RX W HCPCS: Performed by: INTERNAL MEDICINE

## 2020-10-23 PROCEDURE — 82947 ASSAY GLUCOSE BLOOD QUANT: CPT

## 2020-10-23 PROCEDURE — 2500000003 HC RX 250 WO HCPCS: Performed by: FAMILY MEDICINE

## 2020-10-23 PROCEDURE — 99232 SBSQ HOSP IP/OBS MODERATE 35: CPT | Performed by: INTERNAL MEDICINE

## 2020-10-23 PROCEDURE — 83735 ASSAY OF MAGNESIUM: CPT

## 2020-10-23 PROCEDURE — 6370000000 HC RX 637 (ALT 250 FOR IP): Performed by: FAMILY MEDICINE

## 2020-10-23 PROCEDURE — 83605 ASSAY OF LACTIC ACID: CPT

## 2020-10-23 PROCEDURE — 82330 ASSAY OF CALCIUM: CPT

## 2020-10-23 PROCEDURE — C9113 INJ PANTOPRAZOLE SODIUM, VIA: HCPCS | Performed by: NURSE PRACTITIONER

## 2020-10-23 PROCEDURE — 94760 N-INVAS EAR/PLS OXIMETRY 1: CPT

## 2020-10-23 PROCEDURE — 36415 COLL VENOUS BLD VENIPUNCTURE: CPT

## 2020-10-23 PROCEDURE — 82948 REAGENT STRIP/BLOOD GLUCOSE: CPT

## 2020-10-23 PROCEDURE — 2060000000 HC ICU INTERMEDIATE R&B

## 2020-10-23 PROCEDURE — 71045 X-RAY EXAM CHEST 1 VIEW: CPT

## 2020-10-23 RX ORDER — SODIUM CHLORIDE 0.9 % (FLUSH) 0.9 %
10 SYRINGE (ML) INJECTION EVERY 12 HOURS SCHEDULED
Status: DISCONTINUED | OUTPATIENT
Start: 2020-10-23 | End: 2020-10-23

## 2020-10-23 RX ORDER — SODIUM CHLORIDE 0.9 % (FLUSH) 0.9 %
10 SYRINGE (ML) INJECTION PRN
Status: DISCONTINUED | OUTPATIENT
Start: 2020-10-23 | End: 2020-11-06 | Stop reason: HOSPADM

## 2020-10-23 RX ORDER — SODIUM CHLORIDE 450 MG/100ML
INJECTION, SOLUTION INTRAVENOUS CONTINUOUS
Status: DISCONTINUED | OUTPATIENT
Start: 2020-10-23 | End: 2020-10-23

## 2020-10-23 RX ORDER — SODIUM CHLORIDE 0.9 % (FLUSH) 0.9 %
10 SYRINGE (ML) INJECTION PRN
Status: DISCONTINUED | OUTPATIENT
Start: 2020-10-23 | End: 2020-10-23

## 2020-10-23 RX ORDER — PANTOPRAZOLE SODIUM 40 MG/10ML
40 INJECTION, POWDER, LYOPHILIZED, FOR SOLUTION INTRAVENOUS DAILY
Status: DISCONTINUED | OUTPATIENT
Start: 2020-10-23 | End: 2020-10-25

## 2020-10-23 RX ORDER — 0.9 % SODIUM CHLORIDE 0.9 %
30 INTRAVENOUS SOLUTION INTRAVENOUS ONCE
Status: COMPLETED | OUTPATIENT
Start: 2020-10-23 | End: 2020-10-23

## 2020-10-23 RX ORDER — SODIUM CHLORIDE 0.9 % (FLUSH) 0.9 %
10 SYRINGE (ML) INJECTION EVERY 12 HOURS SCHEDULED
Status: DISCONTINUED | OUTPATIENT
Start: 2020-10-23 | End: 2020-11-06 | Stop reason: HOSPADM

## 2020-10-23 RX ORDER — SODIUM CHLORIDE 9 MG/ML
INJECTION, SOLUTION INTRAVENOUS CONTINUOUS
Status: DISCONTINUED | OUTPATIENT
Start: 2020-10-23 | End: 2020-10-24

## 2020-10-23 RX ADMIN — SODIUM CHLORIDE: 234 INJECTION INTRAMUSCULAR; INTRAVENOUS; SUBCUTANEOUS at 17:53

## 2020-10-23 RX ADMIN — ENOXAPARIN SODIUM 40 MG: 40 INJECTION SUBCUTANEOUS at 22:32

## 2020-10-23 RX ADMIN — INSULIN LISPRO 4 UNITS: 100 INJECTION, SOLUTION INTRAVENOUS; SUBCUTANEOUS at 11:16

## 2020-10-23 RX ADMIN — INSULIN LISPRO 2 UNITS: 100 INJECTION, SOLUTION INTRAVENOUS; SUBCUTANEOUS at 17:33

## 2020-10-23 RX ADMIN — SODIUM CHLORIDE: 9 INJECTION, SOLUTION INTRAVENOUS at 16:06

## 2020-10-23 RX ADMIN — PANTOPRAZOLE SODIUM 40 MG: 40 INJECTION, POWDER, FOR SOLUTION INTRAVENOUS at 11:11

## 2020-10-23 RX ADMIN — VANCOMYCIN HYDROCHLORIDE 1750 MG: 5 INJECTION, POWDER, LYOPHILIZED, FOR SOLUTION INTRAVENOUS at 11:26

## 2020-10-23 RX ADMIN — PIPERACILLIN AND TAZOBACTAM 3.38 G: 3; .375 INJECTION, POWDER, LYOPHILIZED, FOR SOLUTION INTRAVENOUS at 02:26

## 2020-10-23 RX ADMIN — INSULIN LISPRO 1 UNITS: 100 INJECTION, SOLUTION INTRAVENOUS; SUBCUTANEOUS at 06:54

## 2020-10-23 RX ADMIN — Medication 10 ML: at 11:16

## 2020-10-23 RX ADMIN — SODIUM CHLORIDE, PRESERVATIVE FREE 10 ML: 5 INJECTION INTRAVENOUS at 22:32

## 2020-10-23 RX ADMIN — PIPERACILLIN AND TAZOBACTAM 3.38 G: 3; .375 INJECTION, POWDER, LYOPHILIZED, FOR SOLUTION INTRAVENOUS at 11:11

## 2020-10-23 RX ADMIN — ENOXAPARIN SODIUM 40 MG: 40 INJECTION SUBCUTANEOUS at 09:01

## 2020-10-23 RX ADMIN — ACETAMINOPHEN 650 MG: 650 SUPPOSITORY RECTAL at 09:01

## 2020-10-23 RX ADMIN — SODIUM CHLORIDE 3960 ML: 9 INJECTION, SOLUTION INTRAVENOUS at 13:02

## 2020-10-23 RX ADMIN — PIPERACILLIN AND TAZOBACTAM 3.38 G: 3; .375 INJECTION, POWDER, LYOPHILIZED, FOR SOLUTION INTRAVENOUS at 17:43

## 2020-10-23 ASSESSMENT — PAIN SCALES - GENERAL
PAINLEVEL_OUTOF10: 0

## 2020-10-23 ASSESSMENT — PAIN SCALES - WONG BAKER

## 2020-10-23 NOTE — PLAN OF CARE
Problem: Falls - Risk of:  Goal: Will remain free from falls  Outcome: Ongoing  Note: The patient has been free of falls this shift. Bed is in low position, 2/4 rails are up, and alarm is active. Call light is in reach, 2/4 rails are up, and hourly rounding performed. Goal: Absence of physical injury  Outcome: Ongoing     Problem: Skin Integrity:  Goal: Will show no infection signs and symptoms  Outcome: Ongoing  Note: No new skin breakdown this shift. Goal: Absence of new skin breakdown  Outcome: Ongoing     Problem: Discharge Planning:  Goal: Discharged to appropriate level of care  Outcome: Ongoing  Note: Discharge is pending at this time. Goal: Ability to perform activities of daily living will improve  Outcome: Ongoing  Goal: Participates in care planning  Outcome: Ongoing     Problem: Urinary Retention:  Goal: Urinary elimination within specified parameters  Outcome: Ongoing  Note: The mason is still in place. Problem: Nutrition  Goal: Optimal nutrition therapy  10/22/2020 2226 by Zayda Back RN  Outcome: Ongoing  Note: Npo at this time d/t failing swallowing eval. The patient is currently on TPN. 10/22/2020 1043 by Tha De Jesus RD, LD  Outcome: Ongoing     Problem: Musculor/Skeletal Functional Status  Goal: Highest potential functional level  Outcome: Ongoing  Goal: Absence of falls  Outcome: Ongoing     Problem: Serum Glucose Level - Abnormal:  Goal: Ability to maintain appropriate glucose levels will improve  Outcome: Ongoing  Note: Chem every 6hrs. Next check at 0000. Problem: Confusion - Acute:  Goal: Absence of continued neurological deterioration signs and symptoms  Outcome: Ongoing  Goal: Mental status will be restored to baseline  Outcome: Ongoing     Problem: Injury - Risk of, Physical Injury:  Goal: Will remain free from falls  Outcome: Ongoing  Note: The patient has been free of falls this shift. Bed is in low position, 2/4 rails are up, and alarm is active.  Call light is

## 2020-10-23 NOTE — PLAN OF CARE
Problem: Nutrition  Goal: Optimal nutrition therapy  10/23/2020 1159 by Micaela Morris RD, LD  Outcome: Ongoing   Nutrition Problem #1: Inadequate oral intake  Intervention: Food and/or Nutrient Delivery: Continue NPO(Unsure at this time if PN to continue, if so would be PPN.)  Nutritional Goals: Patient will receive PN to meet 75% or more of estimated nutrient needs until able to initiate EN or oral diet.

## 2020-10-23 NOTE — PROGRESS NOTES
Progress note: Infectious diseases    Patient - Ela Feliciano,  Age - 44 y.o.    - 1970      Room Number - 6K-25/025-A   MRN -  [id number]   Acct # - [de-identified]  Date of Admission -  2020  9:02 AM    SUBJECTIVE:   He has been spiking fever, very lethargic. CXR appears worse  He is on TPN  Not easy to get line   OBJECTIVE   VITALS    height is 5' 8\" (1.727 m) and weight is 290 lb 14.4 oz (132 kg). His axillary temperature is 99.4 °F (37.4 °C). His blood pressure is 129/70 and his pulse is 90. His respiration is 28 and oxygen saturation is 97%. Wt Readings from Last 3 Encounters:   10/23/20 290 lb 14.4 oz (132 kg)   09/15/20 281 lb 6.4 oz (127.6 kg)   20 (!) 306 lb 6.4 oz (139 kg)       I/O (24 Hours)    Intake/Output Summary (Last 24 hours) at 10/23/2020 1444  Last data filed at 10/23/2020 0407  Gross per 24 hour   Intake 1663.76 ml   Output 1500 ml   Net 163.76 ml       General Appearance lethargic  HEENT - normocephalic, atraumatic, pale  conjunctiva,  anicteric sclera dry crusty oral cavity  Neck - Supple, no mass  Lungs -  Bilateral   air entry, diminished breath sound  Cardiovascular - Heart sounds are normal.    Abdomen - soft, not distended, nontender,   Neurologic -Lethargic  Skin - No bruising or bleeding  Extremities - chronic leg edema, perianal open wound.     MEDICATIONS:      pantoprazole  40 mg Intravenous Daily    vancomycin  1,750 mg Intravenous Q24H    vancomycin (VANCOCIN) intermittent dosing (placeholder)   Other RX Placeholder    sodium chloride flush  10 mL Intravenous 2 times per day    ferrous sulfate  325 mg Oral BID WC    insulin lispro  0-12 Units Subcutaneous Q6H    piperacillin-tazobactam  3.375 g Intravenous Q8H    [Held by provider] gabapentin  400 mg Oral BID    [Held by provider] modafinil  100 mg Oral Daily    enoxaparin  40 mg Subcutaneous BID    insulin glargine  38 Units Subcutaneous Nightly    lidocaine 1 % injection  5 mL Intradermal Once    magnesium replacement protocol   Other RX Placeholder    phosphorus replacement protocol   Other RX Placeholder    calcium replacement protocol   Other RX Placeholder    [Held by provider] ARIPiprazole  15 mg Oral Daily    aspirin  81 mg Oral Daily    glycopyrrolate-formoterol  2 puff Inhalation BID      PN-Adult  3 IN 1 Central Line (Custom)      sodium chloride      PN-Adult  3 IN 1 Central Line (Custom) 100 mL/hr at 10/22/20 1639    dextrose       sodium chloride flush, hydrALAZINE, acetaminophen, potassium chloride **OR** potassium alternative oral replacement **OR** potassium chloride, potassium chloride, lidocaine, acetaminophen **OR** [DISCONTINUED] acetaminophen, polyethylene glycol, promethazine **OR** ondansetron, albuterol, glucose, dextrose, glucagon (rDNA), dextrose      LABS:     CBC:   Recent Labs     10/21/20  0540 10/22/20  0652 10/23/20  0442   WBC 8.5 11.0* 15.1*   HGB 7.5* 8.1* 8.6*   * 506* 469*     BMP:    Recent Labs     10/21/20  0540 10/22/20  0652 10/23/20  0442   * 144 146*   K 3.1* 3.6 4.3   * 109 108   CO2 22* 25 25   BUN 12 12 17   CREATININE 1.8* 1.8* 1.7*   GLUCOSE 139* 155* 144*     Calcium:  Recent Labs     10/23/20  0442   CALCIUM 10.8*     Ionized Calcium:No results for input(s): IONCA in the last 72 hours. Magnesium:  Recent Labs     10/23/20  0442   MG 1.7     Phosphorus:  Recent Labs     10/23/20  0442   PHOS 2.8     BNP:No results for input(s): BNP in the last 72 hours. Glucose:  Recent Labs     10/23/20  0016 10/23/20  0652 10/23/20  1114   POCGLU 128* 192* 207*     HgbA1C: No results for input(s): LABA1C in the last 72 hours. INR: No results for input(s): INR in the last 72 hours.   Hepatic:   Recent Labs     10/21/20  0540 10/22/20  0652 10/23/20  0442   ALKPHOS 81 81 85   ALT 15 15 14   AST 18 18 20   PROT 6.3 6.5 6.9   BILITOT 0.4 0.4 0.6 LABALBU 2.8* 3.0* 3.2*        CULTURES:   UA: No results for input(s): SPECGRAV, PHUR, COLORU, CLARITYU, MUCUS, PROTEINU, BLOODU, RBCUA, WBCUA, BACTERIA, NITRU, GLUCOSEU, BILIRUBINUR, UROBILINOGEN, KETUA, LABCAST, LABCASTTY, AMORPHOS in the last 72 hours. Invalid input(s): CRYSTALS  Micro:   Lab Results   Component Value Date    BC No growth-preliminary  10/22/2020          Problem list of patient:     Patient Active Problem List   Diagnosis Code    Chronic diastolic congestive heart failure (HCC) I50.32    Atrial fibrillation (HCC) I48.91    BPH (benign prostatic hyperplasia) N40.0    Carpal tunnel syndrome on right G56.01    Chronic gout M1A. 9XX0    DM2 (diabetes mellitus, type 2) ([facility] [place] [address] E11.9    Heart failure with preserved ejection fraction (HCC) I50.30    History of alcohol abuse F10.11    History of osteomyelitis Z87.39    Essential hypertension I10    Hypogonadism, male E29.1    Major depression F32.9    Morbid obesity (HCC) E66.01    DURAN (nonalcoholic steatohepatitis) K75.81    KIARA (obstructive sleep apnea) G47.33    Vitamin D deficiency E55.9    Normocytic anemia D64.9    Physical deconditioning R53.81    Mood disorder (HCC) F39    Hallucinations R44.3    GERD (gastroesophageal reflux disease) K21.9    Wound of buttock S31.809A    Chest wall pain with tenderness R07.89    Primary osteoarthritis of left hip M16.12    COVID-19 U07.1    Acute respiratory failure due to COVID-19 (HCC) U07.1, J96.00    Acute respiratory failure with hypoxia (HCC) J96.01    ARF (acute renal failure) with tubular necrosis ([facility]) N17.0    Hypokalemia E87.6    Other hypotension I95.89    Pneumonia due to COVID-19 virus U07.1, J12.89    Sepsis due to COVID-19 (HCC) U07.1, A41.89    KRISTY (acute kidney injury) ([facility] [place] [address] N17.9    Hypernatremia E87.0    Acute encephalopathy G93.40    Acute on chronic anemia D64.9    Hypomagnesemia E83.42    Dysphagia R13.10    Diarrhea R19.7    At risk for malnutrition Z91.89    Reactive thrombocytosis R79.89         ASSESSMENT/PLAN   Fever:has worsening infiltrate: he is on iv zosyn, vancomycin was added  Extremely decondioned following COVID -19 related infection  Ulceration of the rectum due to flexiseal contributing to bleeding:    Obesity  Remains very ill, cultures taken.   Prognosis is guarded: Code status need addressed as he may turn for the Jeanette Carballo MD, Joanna Cobb 10/23/2020 2:44 PM

## 2020-10-23 NOTE — PROGRESS NOTES
PROGRESS NOTE      Patient:  Jeanette Fulling      Unit/Bed:4K-25/025-A    YOB: 1970    MRN: [id number]       Acct: [de-identified]     PCP: Sebas Colvin MD    Date of Admission: 9/23/2020      Assessment/Plan:        Active Hospital Problems    Diagnosis Date Noted    Sepsis due to COVID-19 ([address] [U07.1, A41.89]     KRISTY (acute kidney injury) ([address] [N17.9]     Hypernatremia [E87.0]     Acute encephalopathy [G93.40]     Acute on chronic anemia [D64.9]     Hypomagnesemia [E83.42]     Dysphagia [R13.10]     Diarrhea [R19.7]     At risk for malnutrition [Z91.89]     Reactive thrombocytosis [R79.89]     Pneumonia due to COVID-19 virus [U07.1, J12.89] 10/18/2020    ARF (acute renal failure) with tubular necrosis (HCC) [N17.0]     Hypokalemia [E87.6]     Other hypotension [I95.89]     Acute respiratory failure with hypoxia ([address] [J96.01]     Acute respiratory failure due to COVID-19 ([address] [U07.1, J96.00] 09/06/2020    Essential hypertension [I10]        Sepsis due to Covid pneumonia    -initially resolved, now having intermittent fever again and today leukocytosis is worsening, WBC 15.1 from 11 yesterday.  -Complicated medical course. Recent discharge from [facility] for COVID-19 on 9/15/2020.   -Readmitted for COVID-19 pneumonia on 9/23/2020, intubation on 9/23/2020 and 10/6/2020. Successfully weaned to room air currently.   -Has been treated with vancomycin, doxycycline (10/6-10/14), Zosyn (10/6-10/16) throughout his hospital stay. Likely MRSA pneumonia.   -Per ICU notes patient would be a good candidate for Zyvox, however due to patient's inability to swallow he has been unable to receive Zyvox. -Previously had been afebrile however patient febrile to 100.4 on 10/21/2020. Febrile with low grade at 100.0 rectally 10/22/2020. Patient with residual fever morning of 10/23/2020 up to 103.0 °F  -Patient noted to be septic again on 10/23/2020 with fever, tachycardia, and tachypnea.    -ID has been consulted. Possible source of infection including recurrent pneumonia, rectal wound, PICC line. -IV Zosyn restarted, ID recommending 5-7 day course. We will add IV vancomycin with pharmacy to dose due to recurrent fevers. -Repeat blood cultures are pending  -Chest x-ray showing worsening infiltrates. -Appreciate ID input.   -will order UA and urine culture, pt is on mason cath for urinary retention, last changed was 10/20 per RN  -Patient on able to perform repeat FEES exam due to decompensation  -Unable to place NG tube previously. If PICC line removed patient may not be able to receive nutrition. Patient is at high risk for decompensation. Acute hypoxic respiratory failure due to COVID-19 pneumonia, resolved     -Intubated on 10/6/2020, extubated on 10/15/2020  -Currently saturating well on room air, however patient became more tachypneic on 10/23/2020 likely due to fevers and sepsis. KRISTY due to hypotension, improving     -Creatinine 1.1 on admission. Trended up to 4.1 on 10/7/2020. Baseline creatinine 0.6 to 0.8   -Nephrology following. Appreciate nephrology input.  -Creatinine stable at 1.7 today  -Nephrology recommending continuing IV hydration    Hypernatremia, likely due to dehydration    -off D5W  -initially resolved, now sodium 146  -start IVF  -BMP in am     5. Acute encephalopathy, etiology unclear, likely related to recent Covid 19 infection    -Worsening mentation today with fevers  -Patient likely septic again due to unknown etiology. Infectious causes include rectal wound, pneumonia, PICC line infection  -CT head on 10/20/2020 unremarkable    6. Acute on chronic Normocytic anemia due to acute rectal bleeding- secondary to gluteal/rectal ulcerations vs hemodilutional from IVF, improving    -Hemoglobin of 8.6 today. Baseline Hgb 7-8 in 10/2020, was around 11 in 9/2020  -s/p 2 units PRBC  -Blood clot seen by nursing staff today likely secondary to rectal ulcer.   Patient does have rectal bleed from rectal ulcer from Flexi-Seal  -Continue to monitor hemoglobin  -Transfuse for hemoglobin less than 7  -GI on-board. Sign off due to unlikely GI bleed and rectal bleeding likely secondary to ulceration. 7. Hypokalemia due to hypomagnesemia and diarrhea, resolved     -replace per protocol   -BMP in am     8. Hypomagnesemia likely due to poor oral intake and diarrhea, resolving     -Replace per protocol  -Check magnesium level    9. Dysphagia    -pt failed Fiberoptic Endoscopic Evaluation of the Swallow (FEES). ST recommend NPO, per ST NGT not an option right now due to failed FEES  -started TPN temporarily on 10/21  - plan to repeat FEES on Friday 10/23. However due to patient's fevers and decompensation unable to perform    10. Diarrhea     -c diff test ordered by GI, but not done     11. At risk for malnutrition    -dietician pn-board, appreciate input     12. Sarahi-rectal lesion    -wound ostomy on-board, appreciate input. Cont zinc oxide as ordered. 13. Hx of KIARA  -Noncompliant with CPAP at home  -Pulmonology consulted  -BiPAP ordered    14. Diabetes mellitus type 2  -Blood sugars within goal range of 140-180  -Continue Lantus at current dose and sliding scale insulin  -Accu-Cheks q. 4 and at bedtime  -Hypoglycemia protocol in place    15. Essential hypertension  -Uncontrolled today due to pt not receiving PO meds ( on amlodipine, cardizem and hydralazine PO at home)  -IV hydralazine prn ordered   -Avoid hypotension with recent sepsis and renal failure    16. Physical Deconditioning  Patient likely will need SNF versus LTAC at discharge. Faye evaluation consult ordered  -Palliative care following in discussed case with father who primary decision-maker,     16. Thrombocytosis, likely reactive due to recent COVID-19, improving     -CBC in am     18. Mild hypercalcemia, likely from dehydration    -start IVF  -recheck calcium in am     19.  Urinary retention    -on mason had received vancomycin and developed fever while on vancomycin. Therefore vancomycin was not continued. Sputum subsequently grew staph aureus. Zosyn was discontinued but doxycycline continued on 10/14/2020. Patient did well with spontaneous breathing trial and was extubated 10/15/2020.    10/21: Weaned to room air. Respiratory status remained stable. Spiked fever this morning at 100.4 °F.  Possibly due to rectal wound due to Flexi-Seal.  Restarted on IV Zosyn, ID reconsulted. Patient would be a good candidate for SNF versus LTACH at discharge    10/22: Doing well on room air today. Afebrile. Continue Zosyn for 5 to 7 days. Faye evaluation. Continue zinc oxide for rectal wound. Blood pressure is elevated today, IV hydralazine for BP greater than 160    10/23: Recurrent fevers up to 103.0 °F today. Lactic acid ordered and normal.  Restarted vancomycin per ID. Possible PICC line infection versus worsening pneumonia. IV fluid bolus given. Patient tachypneic and tachycardic this morning, improved post fluid administration. Subjective: Patient is more somnolent this morning. Patient tachypneic with respirations in the 30s and tachycardic with heart rate in the 110s. Per nursing staff patient with fever of 103.0 °F this morning. Repeat chest x-ray ordered showing worsening pulmonary infiltrates. Discussed case with ID who recommended restarting vancomycin. Possibly removing PICC line if continues to spike fevers on Zosyn and vancomycin. Patient will be transferred to ICU stepdown unit for further monitoring.        Medications:  Reviewed    Infusion Medications    PN-Adult  3 IN 1 Central Line (Custom)      sodium chloride 100 mL/hr at 10/23/20 1606    PN-Adult  3 IN 1 Central Line (Custom) 100 mL/hr at 10/22/20 1639    dextrose       Scheduled Medications    pantoprazole  40 mg Intravenous Daily    vancomycin  1,750 mg Intravenous Q24H    vancomycin (VANCOCIN) intermittent dosing (placeholder) Other RX Placeholder    sodium chloride flush  10 mL Intravenous 2 times per day    ferrous sulfate  325 mg Oral BID WC    insulin lispro  0-12 Units Subcutaneous Q6H    piperacillin-tazobactam  3.375 g Intravenous Q8H    [Held by provider] gabapentin  400 mg Oral BID    [Held by provider] modafinil  100 mg Oral Daily    enoxaparin  40 mg Subcutaneous BID    insulin glargine  38 Units Subcutaneous Nightly    lidocaine 1 % injection  5 mL Intradermal Once    magnesium replacement protocol   Other RX Placeholder    phosphorus replacement protocol   Other RX Placeholder    calcium replacement protocol   Other RX Placeholder    [Held by provider] ARIPiprazole  15 mg Oral Daily    aspirin  81 mg Oral Daily    glycopyrrolate-formoterol  2 puff Inhalation BID     PRN Meds: sodium chloride flush, hydrALAZINE, acetaminophen, potassium chloride **OR** potassium alternative oral replacement **OR** potassium chloride, potassium chloride, lidocaine, acetaminophen **OR** [DISCONTINUED] acetaminophen, polyethylene glycol, promethazine **OR** ondansetron, albuterol, glucose, dextrose, glucagon (rDNA), dextrose      Intake/Output Summary (Last 24 hours) at 10/23/2020 1614  Last data filed at 10/23/2020 1536  Gross per 24 hour   Intake 1663.76 ml   Output 2100 ml   Net -436.24 ml       Diet:  PN-Adult  3 IN 1 Central Line (Custom)  PN-Adult  3 IN 1 Peripheral Line (Custom)    Exam:  /79   Pulse 88   Temp 98.3 °F (36.8 °C) (Axillary)   Resp 28   Ht 5' 8\" (1.727 m)   Wt 290 lb 14.4 oz (132 kg)   SpO2 96%   BMI 44.23 kg/m²     General appearance:  laying on bed, acute on chronically ill-appearing. Morbidly obese  HEENT: Pupils equal, round, and reactive to light. Conjunctivae/corneas clear. Neck: Supple, with full range of motion. No jugular venous distention. Trachea midline. Respiratory: Increased respiratory effort. (+) coarse breath sounds on anterior lung fields.   Cardiovascular: Tachycardic with regular rhythm with normal S1/S2 without murmurs, rubs or gallops. Abdomen: Soft, non-tender, non-distended with normal bowel sounds. : mason cath in placed. Musculoskeletal: passive and active ROM x 4 extremities. Skin: Skin color, texture, turgor normal.  No rashes or lesions. Neurologic:  limited exam, awake but pt did not follow commands  Exam of extremities: peripheral pulses normal, trace bipedal pitting edema up to distal legs, no clubbing or cyanosis        Labs:   Recent Labs     10/21/20  0540 10/22/20  0652 10/23/20  0442   WBC 8.5 11.0* 15.1*   HGB 7.5* 8.1* 8.6*   HCT 24.7* 25.6* 28.5*   * 506* 469*     Recent Labs     10/21/20  0540 10/21/20  1327 10/22/20  0652 10/23/20  0442   *  --  144 146*   K 3.1*  --  3.6 4.3   *  --  109 108   CO2 22*  --  25 25   BUN 12  --  12 17   CREATININE 1.8*  --  1.8* 1.7*   CALCIUM 10.4  --  10.2 10.8*   PHOS  --  2.6 1.6* 2.8     Recent Labs     10/21/20  0540 10/22/20  0652 10/23/20  0442   AST 18 18 20   ALT 15 15 14   BILITOT 0.4 0.4 0.6   ALKPHOS 81 81 85     No results for input(s): INR in the last 72 hours. No results for input(s): Eulogio Oiler in the last 72 hours. Urinalysis:      Lab Results   Component Value Date    NITRU NEGATIVE 10/06/2020    WBCUA > 200 10/06/2020    BACTERIA FEW 10/06/2020    RBCUA 5-10 10/06/2020    BLOODU LARGE 10/06/2020    SPECGRAV >=1.030 10/06/2020    GLUCOSEU NEGATIVE 08/05/2020       Radiology:  XR CHEST PORTABLE   Final Result   1. Poor inflation lungs. Borderline heart size. No effusion. 2. Right arm PICC line, catheter tip the cavoatrial junction. 3. Mild infiltrate scattered in both lungs and left infrahilar region. 4. Overall appearance slightly worsened from prior study. **This report has been created using voice recognition software. It may contain minor errors which are inherent in voice recognition technology. **      Final report electronically signed by Dr. Emily Mark on 10/23/2020 10:46 AM      CT ABDOMEN PELVIS WO CONTRAST Additional Contrast? None   Final Result   1. Almost complete resolution of previously seen airspace infiltrates in the lung bases. 2. No acute abdominal or pelvic abnormalities. **This report has been created using voice recognition software. It may contain minor errors which are inherent in voice recognition technology. **      Final report electronically signed by Dr. Sukhi Molina on 10/20/2020 10:50 AM      CT HEAD WO CONTRAST   Final Result    No evidence of acute intracranial abnormality. **This report has been created using voice recognition software. It may contain minor errors which are inherent in voice recognition technology. **      Final report electronically signed by Dr. Rylee Sims MD on 10/20/2020 10:13 AM      XR CHEST PORTABLE   Final Result   Ill-defined bilateral lung opacities. Follow-up as clinically indicated. This document has been electronically signed by: Dread Vergara MD on 10/20/2020 05:38 AM         XR CHEST PORTABLE   Final Result   Minimal residual atelectasis and/or infiltrate within the bilateral mid    and lower lungs with improvement. Improved pulmonary inflation. This document has been electronically signed by: Bri Spangler MD on    10/16/2020 02:02 AM         XR CHEST PORTABLE   Final Result   Impression:   Progressive bilateral consolidations or ARDS. This document has been electronically signed by: Julita Lovett MD on    10/12/2020 04:59 AM         XR CHEST PORTABLE   Final Result    Similar bilateral ill-defined pneumonia. This document has been electronically signed by: Santi Mackey. Arya York DO on    10/11/2020 09:49 AM         XR CHEST PORTABLE   Final Result   Similar diffuse patchy bilateral airspace disease and consolidations. Support devices as above.       This document has been electronically signed by: George Chavez MD on    10/10/2020 04:35 AM XR CHEST PORTABLE   Final Result   No acute findings. This document has been electronically signed by: Paula Rae MD on 10/08/2020 07:40 AM         CT ABDOMEN PELVIS WO CONTRAST Additional Contrast? Oral   Final Result    IMPRESSION:   Bilateral lower lobe pneumonia. Known Covid. Continued progress imaging is advised   Distended colon with fluid, air and stool. Correlate for diarrhea. **This report has been created using voice recognition software. It may contain minor errors which are inherent in voice recognition technology. **      Final report electronically signed by Dr. Pankaj Conway on 10/7/2020 6:49 PM      XR CHEST PORTABLE   Final Result   Bilateral lung consolidation not significant change. This document has been electronically signed by: Paula Rae MD on 10/07/2020 07:25 AM         US RENAL COMPLETE   Final Result   Unremarkable kidneys. This document has been electronically signed by: Hossein Da Silva MD on    10/07/2020 01:48 AM         XR CHEST PORTABLE   Final Result   1. There is a new endotracheal tube with the distal tip 1.8 cm above the level of the madi. 2. There is a new esophageal tube with the distal tip projecting over the gastric fundus. 3. There is a stable right PICC with the distal tip projecting over the right atrium. 4. The lung volumes are diminished. There are bilateral perihilar and the basilar opacities which are similar to the previous examination however may be accentuated by the expiratory technique. There is no pleural effusion. Follow-up chest radiographs    are recommended to confirm complete resolution. **This report has been created using voice recognition software. It may contain minor errors which are inherent in voice recognition technology. **      Final report electronically signed by Dr. Michael Palma on 10/6/2020 7:18 AM      XR CHEST PORTABLE   Final Result   Bilateral lung opacities. Follow-up to clearing recommended. This document has been electronically signed by: Zara Ruiz MD on 10/06/2020 06:09 AM         XR CHEST PORTABLE   Final Result   Slightly decreased diffuse patchy bilateral airspace disease. Support devices as above. This document has been electronically signed by: Briana Kolb MD on    10/03/2020 05:15 AM         XR CHEST PORTABLE   Final Result   ET tube should be retracted 1.5-2.0 cm. No significant change in coarse scattered bilateral infiltrates. This document has been electronically signed by: Hillary Hollins MD on    09/30/2020 06:50 AM         XR CHEST PORTABLE   Final Result      Slightly improving bilateral pneumonia. **This report has been created using voice recognition software. It may contain minor errors which are inherent in voice recognition technology. **      Final report electronically signed by Dr. Ramu Dutta on 9/27/2020 2:23 PM      XR ABDOMEN FOR NG/OG/NE TUBE PLACEMENT   Final Result   Esophageal route tube tip in the stomach. **This report has been created using voice recognition software. It may contain minor errors which are inherent in voice recognition technology. **      Final report electronically signed by Dr Zuhair Man on 9/26/2020 10:22 AM      XR CHEST PORTABLE   Final Result   1. Lines and tubes as above. 2. Worsening bilateral pneumonia. **This report has been created using voice recognition software. It may contain minor errors which are inherent in voice recognition technology. **      Final report electronically signed by Dr. Ramu Dutta on 9/24/2020 7:38 AM      XR CHEST PORTABLE   Final Result   1. Endotracheal tube terminates 3.1 cm above the madi. 2.  Orogastric tube in the stomach. 3.  Patchy opacities in the right upper lobe and lingula.       This document has been electronically signed by: Christina Weaver MD on    09/24/2020

## 2020-10-23 NOTE — PROGRESS NOTES
PPN Follow Up Note    Assessment: Remains NPO, planning re-attempt FEES today. PICC line is to be removed. Changing to PPN. Electrolyte Replacement: none - Effer K 40 mEq PO BID remains on hold while on TPN/PPN. TPN changes for (today) at 1800: no changes. (osmolarity of bag is 840mosm/L - ok to run peripherally)  Clarified with Dr. Tanvir Carvajal     Re-check  Mg, PO4, iCa  (CMP already ordered) 10/24/2020 AM    Will continue to follow and monitor.      Jose E Oquendo, PharmJAYLEN 10/23/2020 12:36 PM

## 2020-10-23 NOTE — PROGRESS NOTES
Patient continues with fever 103. [place] Jovany. Heart rate increase. Notified Dr. Elyse Mooney and Dr. Priya Heaton again.

## 2020-10-23 NOTE — PROGRESS NOTES
Comprehensive Nutrition Assessment    Type and Reason for Visit:  Reassess    Nutrition Recommendations/Plan:   Await plan of care, if PN  to continue would have to be peripherally, would recommend change to Clinimix 4.25/5 at 110 ml/hr. Diet per SLP. Nutrition Assessment:    Pt. declining from a nutritional standpoint AEB PICC line to be removed d/t high fever/infection, thus TPN can no longer be administered for nutrition support. Remains at risk for further nutritional compromise r/t continued NPO status due to dysphagia, previous unsuccessful NGT placement attempts due to anatomical and behavioral issues, catabolic illness, increased nutrient needs to support wound healing, lengthy hospitalization due to positive covid-19, acute respiratory failure, intubated-extubated 10/15/20, sepsis, pneumonia, KRISTY, anemia, GI bleed (lf clot/ulceration at site of rectum), deconditioning, and underlying medical condition (history of obesity, DM, GERD, alcohol abuse, HLD, HTN, vitamin D deficiency), and need for nutrition support. Nutrition recommendations/interventions as per above. Malnutrition Assessment:  Malnutrition Status: At risk for malnutrition (Comment)(no nutrition in ~5 days)    Context:  Acute Illness     Findings of the 6 clinical characteristics of malnutrition:  Energy Intake:  7 - 50% or less of estimated energy requirements for 5 or more days  Weight Loss:  Unable to assess(weight fluctuations with edema)     Body Fat Loss:  No significant body fat loss     Muscle Mass Loss:  No significant muscle mass loss    Fluid Accumulation:  (+1 and +2 edema) Extremities   Strength:  Not Performed    Estimated Daily Nutrient Needs:  Energy (kcal):  2740-0437 (30-32 kcals/kg IBW in active late phase);  Weight Used for Energy Requirements:  Ideal(70 kg - ideal weight)     Protein (g):  ~140 gms (2/kgm IBW) as renal status allows; Weight Used for Protein Requirements:  Ideal(70 kg)        Fluid (ml/day): per MD; Weight Used for Fluid Requirements:  (n/a)      Nutrition Related Findings:  Recent covid, now testing negative, lengthy hospital course. Extubated 10/15/20. SLP following for dysphagia-NPO. Unable to pass NGT on previous attempts d/t anatomical and behavioural issues. TPN was started via PICC on 10/21/20 but will be stopped today due to PICC line being pulled, pt spiked fever, lethargic. Spoke to Dr. Benigno Rico, Resident, RN, and Pharmacist, only option for nutrition support at this tiime would be PPN if desired but unsure due to infection/high fever. Palliative care to talk to pt's father about desired plan of care. Sodium 146, Potassium 4.3, BUN 17, Creatinne 1.7, Glucose 144, Chemstick 192, Phosphorus 2.8, Magnesium 1.7. Iron, lantus, humalog, protonix, zosyn, vanc. 3 BM in the last 24 hours.       Wounds:  (perineum unstageable-pressure, scotum skin tear)       Current Nutrition Therapies:    Current Parenteral Nutrition Orders:  · Type and Formula: 3-in-1 Custom(PICC line)   · Lipids: Daily  · Duration: Continuous  · Rate/Volume: 100 ml/hr  · Current PN Order Was Providin kg to dose, 10 kcals/kg/day, 30% lipid kcals, 1 gram protein/kg/day~ 880 kcals, 88 grams protein, 78 grams CHO, 26 grams lipid per 24 hours  · If  PPN Clinimix initiated would recommend: 4.25/5 Clinimix at 110 ml/hr~ 898 kcals, 112 grams protein, 132 grams CHO, no lipids per 24 hours      Anthropometric Measures:  · Height: 5' 8\" (172.7 cm)  · Current Body Weight: 290 lb 14.4 oz (132 kg)(10/23/20 +1 generalized, +2 UE and LE edema)   · Admission Body Weight: 285 lb (129.3 kg)(20, stated, +1 BLE and BUE edema)    · Usual Body Weight: 306 lb (138.8 kg)(EMR, 20, actual.  299# 8/15/20, bedscale.)     · Ideal Body Weight: 154 lbs;   · BMI: 47.1  · Adjusted Body Weight:  ; (193# (88 kg) adjusted for TPN)   · BMI Categories: Obese Class 3 (BMI 40.0 or greater)       Nutrition Diagnosis:   · Inadequate oral intake related to cognitive or neurological impairment, swallowing difficulty as evidenced by NPO or clear liquid status due to medical condition, nutrition support - parenteral nutrition      Nutrition Interventions:   Food and/or Nutrient Delivery:  Continue NPO(Unsure at this time if PN to continue, if so would be PPN.)  Nutrition Education/Counseling:  No recommendation at this time   Coordination of Nutrition Care:  Continued Inpatient Monitoring    Goals:  Patient will receive PN to meet 75% or more of estimated nutrient needs until able to initiate EN or oral diet. Nutrition Monitoring and Evaluation:   Behavioral-Environmental Outcomes:  (n/a)   Food/Nutrient Intake Outcomes:  Diet Advancement/Tolerance, Parenteral Nutrition Intake/Tolerance  Physical Signs/Symptoms Outcomes:  Biochemical Data, Chewing or Swallowing, GI Status, Fluid Status or Edema, Nutrition Focused Physical Findings, Skin, Weight     Discharge Planning:     Too soon to determine     Electronically signed by Radha Stewart RD, LD on 10/23/20 at 12:00 PM EDT    Contact: [phone]

## 2020-10-23 NOTE — PROGRESS NOTES
Patient transferred to [facility]. Due to Sepsis. Patient had elevated temp today with increase heart rate and respirations.

## 2020-10-23 NOTE — PROGRESS NOTES
Encounters:   10/23/20 290 lb 14.4 oz (132 kg)   09/15/20 281 lb 6.4 oz (127.6 kg)   08/21/20 (!) 306 lb 6.4 oz (139 kg)           Physical Exam :  General Appearance: Obese well-nourished no distress  CNS- responding to verbal commands  Psych-not agitated  Lungs diminished  Abdomen: Appears slightly distended  Musculoskeletal:  Edema -no edema           Last 3 CBC   Recent Labs     10/21/20  0540 10/22/20  0652 10/23/20  0442   WBC 8.5 11.0* 15.1*   RBC 2.70* 2.84* 3.08*   HGB 7.5* 8.1* 8.6*   HCT 24.7* 25.6* 28.5*   * 506* 469*     Last 3 CMP  Recent Labs     10/21/20  0540 10/22/20  0652 10/23/20  0442   * 144 146*   K 3.1* 3.6 4.3   * 109 108   CO2 22* 25 25   BUN 12 12 17   CREATININE 1.8* 1.8* 1.7*   CALCIUM 10.4 10.2 10.8*   LABALBU 2.8* 3.0* 3.2*   BILITOT 0.4 0.4 0.6             ASSESSMENT / Plan   1 Renal -acute kidney injury most likely due to septic ATN/hypotension  ? Improving with some hydration with TPN  Currently at 1. 7   ? Mostly developing a new baseline. ? As needed diuretics    2 Electrolytes -hypokalemia-doing better discontinue p.o. KCl. Getting KCl through the TPN pharmacy adjusting  3 Hypernatremia-fluctuating increased fluid content through the TPN  4 Metabolic acidosis stable  5 Anemia- S/P post rectal clot. GI on board stable  6 Hypotension resolved . Now in fact running higher. Follow at this time. 7 Hx of diabetes mellitus  8 Hypercalcemia-improving follow for now no need for any bisphosphonate this is hypercalcemia of immobility. Fluctuating. Corrected calcium is close to 11.5 again if continues like this will mostly try bisphosphonate next week  9 Hx of COVID-19 pneumonia   10 Hx of diastolic dysfunction volume status reasonable closely follow. As needed diuretics  11 Meds reviewed      EMELY Chilel D.  [facility].

## 2020-10-23 NOTE — CARE COORDINATION
10/23/20, 1:24 PM EDT  DISCHARGE ON GOING [address] day: 30  Location: -25/025-A Reason for admit: Acute respiratory failure with hypoxemia ([facility] [J96.01]  Acute respiratory failure with hypoxia ([facility] [J96.01]  Pneumonia due to COVID-19 virus [U07.1, J12.89]   Procedure:   9/24 CVC left subclavian - 9/30 removed  5/73 PICC right basilic  63/5 Extubated to bipap  10/6 Reintubated  10/15 extubated  Treatment plan of care: Continues on 6K with lengthy stay and multiple complications. Pt not communicating or following commands. Pt has PICC, TPN, IV atbx, SSI. Temp up to 101 today. Pt is a full code. Palliative care following. Barriers to Discharge: Medical stability, Simpson precert  PCP: Vladimir Carmona MD  Readmission Risk Score: 55%  Patient Goals/Plan/Treatment Preferences: Plan is for [facility], pre-cert in process, however patient is declining medically - family to come in.

## 2020-10-23 NOTE — PROGRESS NOTES
Results  10/12/20              Endotracheal tube      MRSA  10/21/20              BC #2                         Sent  10/22/20              BC 1                            NGTD    Ht Readings from Last 1 Encounters:   09/23/20 5' 8\" (1.727 m)        Wt Readings from Last 1 Encounters:   10/23/20 290 lb 14.4 oz (132 kg)         Body mass index is 44.23 kg/m². Estimated Creatinine Clearance: 68 mL/min (A) (based on SCr of 1.7 mg/dL (H)). Goal Trough Level: 15-20 mcg/mL    Assessment/Plan:  Will initiate Vancomycin 1750 mg IV every 24 hours. (Patient was on vancomycin IV 1750mg Q12H earlier in the month however due to renal function at this time will continue with Q24H interval.) Timing of trough level will be determined based on culture results, renal function, and clinical response. No trough ordered at this time. Thank you for the consult. Will continue to follow and monitor.    Noman HerreraD 10/23/2020 11:25 AM

## 2020-10-23 NOTE — PROGRESS NOTES
[facility]  PHYSICAL THERAPY MISSED TREATMENT NOTE  [facility] RENAL TELEMETRY 6K    Date: 10/23/2020  Patient Name: Alba Mitchell        MRN: [id number]   : 1970  (44 y.o.)  Gender: male   Referring Practitioner: AMI Andres MD  Diagnosis: acute respiratory failure with hypoxemia         REASON FOR MISSED TREATMENT:  Pt not appropriate at this time due to decrease in medical status. Plan to decrease frequency due to change in medical status. Will try back as time allows.

## 2020-10-23 NOTE — PROGRESS NOTES
Dr. Rina Veras had approached this RN and update received as to the patient's condition. Dr. Rina Veras expressed that this patient has a poor prognosis. Phone call made to patient's father, Carrie Nguyễn. Updated Carrie Nguyễn as to the patient's condition/concerns. Encouraged Carrie Delma to come and see the patient as it is uncertain as to whether he will continue to decline. Discussed that the patient has been ill for a couple of months and continues to have setbacks. Carrie Nguyễn states that he would like to come and visit the patient and that he will plan on arriving Sunday or Monday. Updated primary RN, Jania Armendariz. Palliative care will continue to follow and assist as appropriate.

## 2020-10-23 NOTE — PROGRESS NOTES
[facility]  SPEECH THERAPY MISSED TREATMENT NOTE  [facility] RENAL TELEMETRY 6K      Date: 10/23/2020  Patient Name: James Velazquez        MRN: [id number]    : 1970  (44 y.o.)    REASON FOR MISSED TREATMENT:    Made attempt to see pt at 0830 for completion of dysphagia interventions. SIERRA Zhu with approval, reporting pt not yet approved for Faye. Upon ST arrival, pt asleep in bed with labored breathing pattern. Unable to awaken; no command following observed during moments of brief restlessness. Certainly NOT appropriate for any PO intake at this time. Plan to f/u tomorrow 10/24 pending pt appropriate for completion of skilled ST services with potential completion of MBS.     Jina Suárez M.S. [id number] [id number]

## 2020-10-24 LAB
ACINETOBACTER BAUMANNII FILM ARRAY: NOT DETECTED
ALBUMIN SERPL-MCNC: 2.6 G/DL (ref 3.5–5.1)
ALP BLD-CCNC: 76 U/L (ref 38–126)
ALT SERPL-CCNC: 14 U/L (ref 11–66)
ANION GAP SERPL CALCULATED.3IONS-SCNC: 12 MEQ/L (ref 8–16)
ANISOCYTOSIS: PRESENT
AST SERPL-CCNC: 16 U/L (ref 5–40)
BASOPHILIA: ABNORMAL
BASOPHILS # BLD: 0.2 %
BASOPHILS ABSOLUTE: 0 THOU/MM3 (ref 0–0.1)
BILIRUB SERPL-MCNC: 0.5 MG/DL (ref 0.3–1.2)
BOTTLE TYPE: NORMAL
BUN BLDV-MCNC: 17 MG/DL (ref 7–22)
CALCIUM IONIZED: 1.27 MMOL/L (ref 1.12–1.32)
CALCIUM SERPL-MCNC: 9 MG/DL (ref 8.5–10.5)
CANDIDA ALBICANS FILM ARRAY: NOT DETECTED
CANDIDA GLABRATA FILM ARRAY: NOT DETECTED
CANDIDA KRUSEI FILM ARRAY: NOT DETECTED
CANDIDA PARAPSILOSIS FILM ARRAY: NOT DETECTED
CANDIDA TROPICALIS FILM ARRAY: NOT DETECTED
CARBAPENEM RESITANT FILM ARRAY: NORMAL
CHLORIDE BLD-SCNC: 112 MEQ/L (ref 98–111)
CO2: 24 MEQ/L (ref 23–33)
CREAT SERPL-MCNC: 1.5 MG/DL (ref 0.4–1.2)
ENTERBACTER CLOACAE FILM ARRAY: NOT DETECTED
ENTERBACTERIACEAE FILM ARRAY: NOT DETECTED
ENTEROCOCCUS FILM ARRAY: NOT DETECTED
EOSINOPHIL # BLD: 3.8 %
EOSINOPHILS ABSOLUTE: 0.5 THOU/MM3 (ref 0–0.4)
ERYTHROCYTE [DISTWIDTH] IN BLOOD BY AUTOMATED COUNT: 19 % (ref 11.5–14.5)
ERYTHROCYTE [DISTWIDTH] IN BLOOD BY AUTOMATED COUNT: 65.3 FL (ref 35–45)
ESCHERICHIA COLI FILM ARRAY: NOT DETECTED
GFR SERPL CREATININE-BSD FRML MDRD: 59 ML/MIN/1.73M2
GLUCOSE BLD-MCNC: 145 MG/DL (ref 70–108)
GLUCOSE BLD-MCNC: 147 MG/DL (ref 70–108)
GLUCOSE BLD-MCNC: 154 MG/DL (ref 70–108)
GLUCOSE BLD-MCNC: 159 MG/DL (ref 70–108)
GLUCOSE BLD-MCNC: 161 MG/DL (ref 70–108)
GLUCOSE BLD-MCNC: 162 MG/DL (ref 70–108)
GLUCOSE BLD-MCNC: 165 MG/DL (ref 70–108)
GLUCOSE BLD-MCNC: 170 MG/DL (ref 70–108)
HAEMOPHILUS INFLUENZA FILM ARRAY: NOT DETECTED
HCT VFR BLD CALC: 24.3 % (ref 42–52)
HEMOGLOBIN: 7.1 GM/DL (ref 14–18)
IMMATURE GRANS (ABS): 0.08 THOU/MM3 (ref 0–0.07)
IMMATURE GRANULOCYTES: 0.6 %
KLEBSIELLA OXYTOCA FILM ARRAY: NOT DETECTED
KLEBSIELLA PNEUMONIAE FILM ARRAY: NOT DETECTED
LISTERIA MONOCYTOGENES FILM ARRAY: NOT DETECTED
LYMPHOCYTES # BLD: 6.4 %
LYMPHOCYTES ABSOLUTE: 0.8 THOU/MM3 (ref 1–4.8)
MAGNESIUM: 1.8 MG/DL (ref 1.6–2.4)
MCH RBC QN AUTO: 27.7 PG (ref 26–33)
MCHC RBC AUTO-ENTMCNC: 29.2 GM/DL (ref 32.2–35.5)
MCV RBC AUTO: 94.9 FL (ref 80–94)
METHICILLIN RESISTANT FILM ARRAY: NORMAL
MONOCYTES # BLD: 5.8 %
MONOCYTES ABSOLUTE: 0.7 THOU/MM3 (ref 0.4–1.3)
NEISSERIA MENIGITIDIS FILM ARRAY: NOT DETECTED
NUCLEATED RED BLOOD CELLS: 0 /100 WBC
PHOSPHORUS: 3.7 MG/DL (ref 2.4–4.7)
PLATELET # BLD: 312 THOU/MM3 (ref 130–400)
PMV BLD AUTO: 10.3 FL (ref 9.4–12.4)
POTASSIUM SERPL-SCNC: 4.2 MEQ/L (ref 3.5–5.2)
PROTEUS FILM ARRAY: NOT DETECTED
PSEUDOMONAS AERUGINOSA FILM ARRAY: NOT DETECTED
RBC # BLD: 2.56 MILL/MM3 (ref 4.7–6.1)
ROULEAUX: SLIGHT
SCAN OF BLOOD SMEAR: NORMAL
SEG NEUTROPHILS: 83.2 %
SEGMENTED NEUTROPHILS ABSOLUTE COUNT: 10.6 THOU/MM3 (ref 1.8–7.7)
SERRATIA MARCESCENS FILM ARRAY: NOT DETECTED
SODIUM BLD-SCNC: 148 MEQ/L (ref 135–145)
SOURCE OF BLOOD CULTURE: NORMAL
STAPH AUREUS FILM ARRAY: NOT DETECTED
STAPHYLOCOCCUS FILM ARRAY: NOT DETECTED
STREP AGALACTIAE FILM ARRAY: NOT DETECTED
STREP PNEUMONIAE FILM ARRAY: NOT DETECTED
STREP PYOCGENES FILM ARRAY: NOT DETECTED
STREPTOCOCCUS FILM ARRAY: NOT DETECTED
TOTAL PROTEIN: 5.9 G/DL (ref 6.1–8)
VANCOMYCIN RESISTANT FILM ARRAY: NORMAL
WBC # BLD: 12.8 THOU/MM3 (ref 4.8–10.8)

## 2020-10-24 PROCEDURE — 94761 N-INVAS EAR/PLS OXIMETRY MLT: CPT

## 2020-10-24 PROCEDURE — 94660 CPAP INITIATION&MGMT: CPT

## 2020-10-24 PROCEDURE — 36415 COLL VENOUS BLD VENIPUNCTURE: CPT

## 2020-10-24 PROCEDURE — 83735 ASSAY OF MAGNESIUM: CPT

## 2020-10-24 PROCEDURE — 87449 NOS EACH ORGANISM AG IA: CPT

## 2020-10-24 PROCEDURE — 6360000002 HC RX W HCPCS: Performed by: STUDENT IN AN ORGANIZED HEALTH CARE EDUCATION/TRAINING PROGRAM

## 2020-10-24 PROCEDURE — 84100 ASSAY OF PHOSPHORUS: CPT

## 2020-10-24 PROCEDURE — 85025 COMPLETE CBC W/AUTO DIFF WBC: CPT

## 2020-10-24 PROCEDURE — 80053 COMPREHEN METABOLIC PANEL: CPT

## 2020-10-24 PROCEDURE — 2580000003 HC RX 258: Performed by: STUDENT IN AN ORGANIZED HEALTH CARE EDUCATION/TRAINING PROGRAM

## 2020-10-24 PROCEDURE — 94640 AIRWAY INHALATION TREATMENT: CPT

## 2020-10-24 PROCEDURE — C9113 INJ PANTOPRAZOLE SODIUM, VIA: HCPCS | Performed by: NURSE PRACTITIONER

## 2020-10-24 PROCEDURE — 82330 ASSAY OF CALCIUM: CPT

## 2020-10-24 PROCEDURE — 82948 REAGENT STRIP/BLOOD GLUCOSE: CPT

## 2020-10-24 PROCEDURE — 6360000002 HC RX W HCPCS: Performed by: NURSE PRACTITIONER

## 2020-10-24 PROCEDURE — 2580000003 HC RX 258: Performed by: INTERNAL MEDICINE

## 2020-10-24 PROCEDURE — 2580000003 HC RX 258: Performed by: FAMILY MEDICINE

## 2020-10-24 PROCEDURE — 6360000002 HC RX W HCPCS: Performed by: INTERNAL MEDICINE

## 2020-10-24 PROCEDURE — 2060000000 HC ICU INTERMEDIATE R&B

## 2020-10-24 PROCEDURE — 99232 SBSQ HOSP IP/OBS MODERATE 35: CPT | Performed by: INTERNAL MEDICINE

## 2020-10-24 PROCEDURE — 87899 AGENT NOS ASSAY W/OPTIC: CPT

## 2020-10-24 PROCEDURE — 2500000003 HC RX 250 WO HCPCS: Performed by: PHARMACIST

## 2020-10-24 RX ORDER — SODIUM CHLORIDE 450 MG/100ML
INJECTION, SOLUTION INTRAVENOUS CONTINUOUS
Status: DISCONTINUED | OUTPATIENT
Start: 2020-10-24 | End: 2020-10-25

## 2020-10-24 RX ADMIN — PANTOPRAZOLE SODIUM 40 MG: 40 INJECTION, POWDER, FOR SOLUTION INTRAVENOUS at 08:16

## 2020-10-24 RX ADMIN — SODIUM CHLORIDE: 9 INJECTION, SOLUTION INTRAVENOUS at 02:23

## 2020-10-24 RX ADMIN — ENOXAPARIN SODIUM 40 MG: 40 INJECTION SUBCUTANEOUS at 08:17

## 2020-10-24 RX ADMIN — INSULIN LISPRO 2 UNITS: 100 INJECTION, SOLUTION INTRAVENOUS; SUBCUTANEOUS at 23:39

## 2020-10-24 RX ADMIN — VANCOMYCIN HYDROCHLORIDE 1750 MG: 5 INJECTION, POWDER, LYOPHILIZED, FOR SOLUTION INTRAVENOUS at 13:53

## 2020-10-24 RX ADMIN — SODIUM CHLORIDE: 4.5 INJECTION, SOLUTION INTRAVENOUS at 13:59

## 2020-10-24 RX ADMIN — INSULIN LISPRO 2 UNITS: 100 INJECTION, SOLUTION INTRAVENOUS; SUBCUTANEOUS at 06:45

## 2020-10-24 RX ADMIN — PANTOPRAZOLE SODIUM 40 MG: 40 INJECTION, POWDER, FOR SOLUTION INTRAVENOUS at 08:17

## 2020-10-24 RX ADMIN — GLYCOPYRROLATE AND FORMOTEROL FUMARATE 2 PUFF: 9; 4.8 AEROSOL, METERED RESPIRATORY (INHALATION) at 08:08

## 2020-10-24 RX ADMIN — PIPERACILLIN AND TAZOBACTAM 3.38 G: 3; .375 INJECTION, POWDER, LYOPHILIZED, FOR SOLUTION INTRAVENOUS at 11:19

## 2020-10-24 RX ADMIN — PIPERACILLIN AND TAZOBACTAM 3.38 G: 3; .375 INJECTION, POWDER, LYOPHILIZED, FOR SOLUTION INTRAVENOUS at 18:45

## 2020-10-24 RX ADMIN — ENOXAPARIN SODIUM 40 MG: 40 INJECTION SUBCUTANEOUS at 20:41

## 2020-10-24 RX ADMIN — INSULIN LISPRO 2 UNITS: 100 INJECTION, SOLUTION INTRAVENOUS; SUBCUTANEOUS at 11:24

## 2020-10-24 RX ADMIN — SODIUM CHLORIDE: 234 INJECTION INTRAMUSCULAR; INTRAVENOUS; SUBCUTANEOUS at 18:46

## 2020-10-24 RX ADMIN — SODIUM CHLORIDE, PRESERVATIVE FREE 10 ML: 5 INJECTION INTRAVENOUS at 08:18

## 2020-10-24 RX ADMIN — INSULIN LISPRO 2 UNITS: 100 INJECTION, SOLUTION INTRAVENOUS; SUBCUTANEOUS at 19:00

## 2020-10-24 RX ADMIN — PIPERACILLIN AND TAZOBACTAM 3.38 G: 3; .375 INJECTION, POWDER, LYOPHILIZED, FOR SOLUTION INTRAVENOUS at 02:23

## 2020-10-24 ASSESSMENT — PAIN SCALES - WONG BAKER

## 2020-10-24 NOTE — PROGRESS NOTES
Progress note: Infectious diseases    Patient - Don Colin,  Age - 44 y.o.    - 1970      Room Number - 4K-25/025-A   MRN -  [id number]   Acct # - [de-identified]  Date of Admission -  2020  9:02 AM    SUBJECTIVE:   Blood cx from peripheral vein showing gram positive: not identifed by biofire: may be skin colonization (micrococcus?)  Fever is better today  OBJECTIVE   VITALS    height is 5' 8\" (1.727 m) and weight is 289 lb 1.6 oz (131.1 kg). His axillary temperature is 99 °F (37.2 °C). His blood pressure is 174/91 (abnormal) and his pulse is 79. His respiration is 16 and oxygen saturation is 100%. Wt Readings from Last 3 Encounters:   10/24/20 289 lb 1.6 oz (131.1 kg)   09/15/20 281 lb 6.4 oz (127.6 kg)   20 (!) 306 lb 6.4 oz (139 kg)       I/O (24 Hours)    Intake/Output Summary (Last 24 hours) at 10/24/2020 1721  Last data filed at 10/24/2020 1513  Gross per 24 hour   Intake 8788.04 ml   Output 3350 ml   Net 5438.04 ml       General Appearance :on BIPAP, lethargic  HEENT - normocephalic, atraumatic, pale  conjunctiva,  anicteric sclera dry oral cavity  Neck - Supple, no mass  Lungs -  Bilateral   air entry, diminished breath sound  Cardiovascular - Heart sounds are normal.    Abdomen - soft, not distended, nontender,   Neurologic -Lethargic, on BIPAP  Skin - No bruising or bleeding  Extremities - chronic leg edema, perianal open wound. Dimas in place.     MEDICATIONS:      pantoprazole  40 mg Intravenous Daily    vancomycin  1,750 mg Intravenous Q24H    vancomycin (VANCOCIN) intermittent dosing (placeholder)   Other RX Placeholder    sodium chloride flush  10 mL Intravenous 2 times per day    ferrous sulfate  325 mg Oral BID WC    insulin lispro  0-12 Units Subcutaneous Q6H    piperacillin-tazobactam  3.375 g Intravenous Q8H    [Held by provider] gabapentin  400 mg Oral BID    [Held by 10/24/20  0320   ALKPHOS 81 85 76   ALT 15 14 14   AST 18 20 16   PROT 6.5 6.9 5.9*   BILITOT 0.4 0.6 0.5   LABALBU 3.0* 3.2* 2.6*        CULTURES:   UA: No results for input(s): SPECGRAV, PHUR, COLORU, CLARITYU, MUCUS, PROTEINU, BLOODU, RBCUA, WBCUA, BACTERIA, NITRU, GLUCOSEU, BILIRUBINUR, UROBILINOGEN, KETUA, LABCAST, LABCASTTY, AMORPHOS in the last 72 hours. Invalid input(s): CRYSTALS  Micro:   Lab Results   Component Value Date    BC No growth-preliminary  10/22/2020          Problem list of patient:     Patient Active Problem List   Diagnosis Code    Chronic diastolic congestive heart failure (HCC) I50.32    Atrial fibrillation (HCC) I48.91    BPH (benign prostatic hyperplasia) N40.0    Carpal tunnel syndrome on right G56.01    Chronic gout M1A. 9XX0    DM2 (diabetes mellitus, type 2) ([facility] E11.9    Heart failure with preserved ejection fraction (HCC) I50.30    History of alcohol abuse F10.11    History of osteomyelitis Z87.39    Essential hypertension I10    Hypogonadism, male E29.1    Major depression F32.9    Morbid obesity (HCC) E66.01    DURAN (nonalcoholic steatohepatitis) K75.81    KIARA (obstructive sleep apnea) G47.33    Vitamin D deficiency E55.9    Normocytic anemia D64.9    Physical deconditioning R53.81    Mood disorder (HCC) F39    Hallucinations R44.3    GERD (gastroesophageal reflux disease) K21.9    Wound of buttock S31.809A    Chest wall pain with tenderness R07.89    Primary osteoarthritis of left hip M16.12    COVID-19 U07.1    COVID-19 virus infection U07.1    Acute respiratory failure with hypoxia (HCC) J96.01    ARF (acute renal failure) with tubular necrosis (HCC) N17.0    Hypokalemia E87.6    Other hypotension I95.89    Pneumonia due to COVID-19 virus U07.1, J12.89    Sepsis due to COVID-19 (HCC) U07.1, A41.89    KRISTY (acute kidney injury) ([facility] N17.9    Hypernatremia E87.0    Acute encephalopathy G93.40    Acute on chronic anemia D64.9    Hypomagnesemia E83.42    Dysphagia R13.10    Diarrhea R19.7    At risk for malnutrition Z91.89    Reactive thrombocytosis R79.89         ASSESSMENT/PLAN   Respiratory failure following COVID -19 infection  Deconditioning  Fever: better today  Ulceration of the rectum due to flexiseal contributing to bleeding:    Obesity  Continue current treatment.  Follow blood cx report  Harinder Olson MD, FACP 10/24/2020 5:21 PM

## 2020-10-24 NOTE — PROGRESS NOTES
TPN Follow Up Note    Assessment: continue TPN, patient septic  Electrolyte Replacement: none    TPN changes for (today) at 1800:   Decrease sodium chloride to 150 meq/bag    Re-check BMP, Mg, PO4, iCa 10/25/2020

## 2020-10-24 NOTE — PROGRESS NOTES
Renal Progress Note  [facility]    Patient :  Leah Celeste; 44 y.o. MRN# [id number]  Location:  9Y-06/842-R  Attending:  Jayne Nettles DO  Admit Date:  9/23/2020   Hospital Day: 32      Subjective:     Nephrology is following the patient for KRISTY. Patient seen and examined. On bipap. Not very interactive. Good urine output. On tpn. Outpatient Medications:     Medications Prior to Admission: vitamin C (ASCORBIC ACID) 500 MG tablet, Take 2 tablets by mouth daily  enoxaparin (LOVENOX) 120 MG/0.8ML injection, Inject 0.87 mLs into the skin every 12 hours  aspirin 81 MG chewable tablet, Take 1 tablet by mouth daily  CHOLECALCIFEROL PO, Take 2,000 Units by mouth daily  atorvastatin (LIPITOR) 40 MG tablet, Take 40 mg by mouth nightly  acetaminophen (TYLENOL) 325 MG tablet, Take 650 mg by mouth every 4 hours as needed for Pain  benzocaine (BABY ORAJEL) 7.5 % oral gel, Take by mouth 4 times daily as needed for Pain (mouth pain) Take by mouth 3 times daily. guaiFENesin (ROBITUSSIN) 100 MG/5ML syrup, Take 200 mg by mouth every 4 hours as needed for Cough  oxyCODONE-acetaminophen (PERCOCET) 5-325 MG per tablet, Take 1 tablet by mouth every 4 hours as needed for Pain.    busPIRone (BUSPAR) 10 MG tablet, Take 10 mg by mouth 2 times daily   Trolamine Salicylate (ASPERCREME) 10 % LOTN, Apply topically as needed for Pain  pantoprazole (PROTONIX) 40 MG tablet, Take 1 tablet by mouth daily  allopurinol (ZYLOPRIM) 300 MG tablet, Take 1 tablet by mouth daily (Patient taking differently: Take 500 mg by mouth daily )  allopurinol (ZYLOPRIM) 100 MG tablet, Take 2 tablets by mouth daily  lidocaine (XYLOCAINE) 5 % ointment, Apply topically as needed to painful areas on lower back, hips, knees, and feet  esomeprazole Magnesium (NEXIUM) 20 MG PACK, Take 20 mg by mouth daily  hydrALAZINE (APRESOLINE) 50 MG tablet, Take 50 mg by mouth 2 times daily   Wound Dressings (MAXORB EX), Apply topically  polyethylene glycol (GLYCOLAX) powder, Take 17 g by mouth every other day   Multiple Vitamins-Minerals (THERAPEUTIC MULTIVITAMIN-MINERALS) tablet, Take 1 tablet by mouth daily  Diaper Rash Products (PINXAV) OINT, Apply topically  Probiotic Product (SOBIA-BID PROBIOTIC PO), Take 1 tablet by mouth daily   ondansetron (ZOFRAN) 4 MG tablet, Take 4 mg by mouth every 8 hours as needed for Nausea or Vomiting  diltiazem (CARDIZEM) 60 MG tablet, Take 60 mg by mouth 2 times daily  tamsulosin (FLOMAX) 0.4 MG capsule, Take 0.4 mg by mouth nightly   folic acid (FOLVITE) 1 MG tablet, Take 1 mg by mouth daily  furosemide (LASIX) 40 MG tablet, Take 40 mg by mouth daily  gabapentin (NEURONTIN) 400 MG capsule, Take 800 mg by mouth 2 times daily. insulin lispro (HUMALOG) 100 UNIT/ML injection vial, Inject into the skin 3 times daily (before meals) Per scale: 151-200=1 unit, 201-250=2 units, 251-300-3 units, 301-350=4 units, 351-400=5 units. sitaGLIPtan-metformin (JANUMET)  MG per tablet, Take 1 tablet by mouth 2 times daily (with meals)  insulin glargine (LANTUS) 100 UNIT/ML injection vial, Inject 10 Units into the skin nightly   senna (SENOKOT) 8.6 MG tablet, Take 1 tablet by mouth 2 times daily  diazepam (VALIUM) 5 MG tablet, Take 5 mg by mouth every 6 hours as needed for Anxiety. ARIPiprazole (ABILIFY PO), Take 15 mg by mouth daily   amlodipine (NORVASC) 10 MG tablet, Take 5 mg by mouth daily   Citalopram Hydrobromide (CELEXA PO), Take 30 mg by mouth daily From [facility] professional services   Blood Glucose Monitoring Suppl (BETZYSTYLE KLARISSA) ARTIE, Patient needs all supplies for qd testing.  DX: 250.00    Current Medications:     Scheduled Meds:    pantoprazole  40 mg Intravenous Daily    vancomycin  1,750 mg Intravenous Q24H    vancomycin (VANCOCIN) intermittent dosing (placeholder)   Other RX Placeholder    sodium chloride flush  10 mL Intravenous 2 times per day    ferrous sulfate  325 mg Oral BID WC    insulin lispro  0-12 Units Subcutaneous Q6H    piperacillin-tazobactam  3.375 g Intravenous Q8H    [Held by provider] gabapentin  400 mg Oral BID    [Held by provider] modafinil  100 mg Oral Daily    enoxaparin  40 mg Subcutaneous BID    insulin glargine  38 Units Subcutaneous Nightly    lidocaine 1 % injection  5 mL Intradermal Once    magnesium replacement protocol   Other RX Placeholder    phosphorus replacement protocol   Other RX Placeholder    calcium replacement protocol   Other RX Placeholder    [Held by provider] ARIPiprazole  15 mg Oral Daily    aspirin  81 mg Oral Daily    glycopyrrolate-formoterol  2 puff Inhalation BID     Continuous Infusions:    PN-Adult  3 IN 1 Central Line (Custom)      sodium chloride      PN-Adult  3 IN 1 Central Line (Custom) 100 mL/hr at 10/24/20 0828    dextrose       PRN Meds:  sodium chloride flush, hydrALAZINE, acetaminophen, potassium chloride **OR** potassium alternative oral replacement **OR** potassium chloride, potassium chloride, lidocaine, acetaminophen **OR** [DISCONTINUED] acetaminophen, polyethylene glycol, promethazine **OR** ondansetron, albuterol, glucose, dextrose, glucagon (rDNA), dextrose    Input/Output:       I/O last 3 completed shifts: In: 6192.3 [I.V.:6192.3]  Out: 2100 [Urine:2100]. Patient Vitals for the past 96 hrs (Last 3 readings):   Weight   10/24/20 0308 289 lb 1.6 oz (131.1 kg)   10/23/20 0406 290 lb 14.4 oz (132 kg)       Vital Signs:   Temperature:  Temp: 98.6 °F (37 °C)  TMax:   Temp (24hrs), Av.5 °F (36.9 °C), Min:97.4 °F (36.3 °C), Max:99.7 °F (37.6 °C)    Respirations:  Resp: 15  Pulse:   Pulse: 79  BP:    BP: (!) 148/87  BP Range: Systolic (43PRL), RGZ:447 , Min:129 , VBV:630       Diastolic (45OHG), DCY:46, Min:67, Max:92      Physical Examination:     General:  Awake, alert, morbidly obese  HEENT: NC/AT/ MMM  Chest:               diminished  Cardiac:  S1 S2   Abdomen: Soft, non-tender,  SKIN:  No rashes, good skin turgor.   Extremities: No edema, no cyanosis    Labs:       Recent Labs     10/22/20  0652 10/23/20  0442 10/24/20  0320   WBC 11.0* 15.1* 12.8*   RBC 2.84* 3.08* 2.56*   HGB 8.1* 8.6* 7.1*   HCT 25.6* 28.5* 24.3*   MCV 90.1 92.5 94.9*   MCH 28.5 27.9 27.7   MCHC 31.6* 30.2* 29.2*   * 469* 312   MPV 10.6 10.7 10.3      BMP:   Recent Labs     10/22/20  0652 10/23/20  0442 10/24/20  0320    146* 148*   K 3.6 4.3 4.2    108 112*   CO2 25 25 24   BUN 12 17 17   CREATININE 1.8* 1.7* 1.5*   GLUCOSE 155* 144* 162*   CALCIUM 10.2 10.8* 9.0      Phosphorus:     Recent Labs     10/22/20  0652 10/23/20  0442 10/24/20  0320   PHOS 1.6* 2.8 3.7     Magnesium:    Recent Labs     10/22/20  0652 10/23/20  0442 10/24/20  0320   MG 1.8 1.7 1.8     Albumin:    Recent Labs     10/22/20  0652 10/23/20  0442 10/24/20  0320   LABALBU 3.0* 3.2* 2.6*     BNP:    No results found for: BNP  MARÍA:    No results found for: MARÍA  SPEP:  Lab Results   Component Value Date    PROT 5.9 10/24/2020     UPEP:   No results found for: LABPE  C3:   No results found for: C3  C4:   No results found for: C4  MPO ANCA:   No results found for: MPO  PR3 ANCA:   No results found for: PR3  Anti-GBM:   No results found for: GBMABIGG  Hep BsAg:         Lab Results   Component Value Date    HEPBSAG Negative 10/15/2019     Hep C AB:          Lab Results   Component Value Date    HEPCAB Negative 10/15/2019       Urinalysis/Chemistries:      Lab Results   Component Value Date    NITRU NEGATIVE 10/06/2020    COLORU DARK YELLOW 10/06/2020    PHUR 5.0 10/06/2020    LABCAST 0-4 FINE GRAN 10/06/2020    LABCAST 0-4 C. GRAN 10/06/2020    WBCUA > 200 10/06/2020    RBCUA 5-10 10/06/2020    YEAST FEW BUDDING 09/23/2020    BACTERIA FEW 10/06/2020    SPECGRAV >=1.030 10/06/2020    LEUKOCYTESUR SMALL 10/06/2020    LEUKOCYTESUR NEGATIVE 09/23/2020    UROBILINOGEN 1.0 10/06/2020    BILIRUBINUR MODERATE 10/06/2020    BLOODU LARGE 10/06/2020    GLUCOSEU NEGATIVE 08/05/2020    KETUA TRACE 10/06/2020    AMORPHOUS URATES 10/06/2020     Urine Sodium:   No results found for: MICAELA  Urine Potassium:  No results found for: KUR  Urine Chloride:  No results found for: CLUR  Urine Osmolarity:   Lab Results   Component Value Date    SADNEEP 713 09/23/2020     Urine Protein:   No components found for: TOTALPROTEIN, URINE   Urine Creatinine:   No results found for: LABCREA  Urine Eosinophils:  No components found for: UEOS        Impression and Plan:  1. KRISTY: overall improved  2. Hypernatremia: stop normal saline, start 0.45 @ 60 cc/hour. Adjust sodium in TPN  3. Hypokalemia improved through KCl in TPN  4. + blood culture  5. Hypercalcemia, improved, ionized calcium is normal  6. DM  7. Recent COVID-19 infection  8. Acute hypoxic resp failure  9. anemia          Please don't hesitate to call with any questions.   Electronically signed by Raza Wilson DO on 10/24/2020 at 12:25 PM

## 2020-10-24 NOTE — PROGRESS NOTES
Hospitalist Progress Note      Patient:  Brooke Peer    Unit/Bed:4K-25/025-A  YOB: 1970  MRN: [id number]   Acct: [de-identified]     PCP: Ольга Rockwell MD  Date of Admission: 9/23/2020     Date of Service: Pt seen/examined on 10/24/20  and Admitted to Inpatient with expected LOS greater than two midnights due to medical therapy. Chief Complaint:  Shortness of Breath    Assessment and Plan:    1.) Sepsis due to Covid Pneumonia and Bacteremia: Initially resolved, now having intermittent fever again and today leukocytosis is worsening, WBC 15.1 from 11 yesterday. Complicated medical course. Recent discharge from [facility] for COVID-19 on 9/15/2020. Readmitted for COVID-19 pneumonia on 9/23/2020, intubation on 9/23/2020 and 10/6/2020. Successfully weaned to room air currently. Per ICU notes patient would be a good candidate for Zyvox, however due to patient's inability to swallow he has been unable to receive Zyvox. 10/23: Has been treated with vancomycin, doxycycline (10/6-10/14), Zosyn (10/6-10/16) throughout his hospital stay. Likely MRSA pneumonia. -Previously had been afebrile however patient febrile to 100.4 on 10/21/2020. Febrile with low grade at 100.0 rectally 10/22/2020. Patient with residual fever morning of 10/23/2020 up to 103.0 °F      -Patient noted to be septic again with fever, tachycardia, and tachypnea. -ID has been consulted. Possible source of infection including recurrent pneumonia, rectal wound, PICC line. -IV Zosyn restarted, ID recommending 5-7 day course. We will add IV vancomycin with pharmacy to dose due to recurrent fevers. -Repeat blood cultures are pending      -Chest x-ray showing worsening infiltrates.        -Will order UA and urine culture, pt is on mason cath for urinary retention, last changed was 10/20 per RN      -Patient unable to perform repeat FEES exam due to decompensation    -Unable to place NG tube previously. If PICC line removed patient may not be able to receive nutrition. Patient is at high risk for decompensation. 10/24: Following ID recommendations, Continue Vanc/Zosyn    - TPN started, IVF 1/2 NS at 60 ml/hr    - Blood Culture (+) for Gram Positive cocci in clusters    - Common Respiratory Panel negative    - Lactic Acid normal, ProCal 4.58    2.) Acute hypoxic respiratory failure due to COVID-19 pneumonia (Resolved): Intubated on 10/6/2020, extubated on 10/15/2020.    10/23: Currently saturating well on room air, however patient became more tachypneic    10/24: Patient on 2L NC    3.) KRISTY due to hypotension (Improving):  Creatinine 1.1 on admission. Trended up to 4.1 on 10/7/2020. Baseline creatinine 0.6 to 0.8. Nephrology following. Appreciate nephrology input. 10/23: Creatinine stable at 1.7 today    - Nephrology recommending continuing IV hydration    10/24: Cr 1.5 today, trending down    - Nephrology switched to 1/2 NS at 60 ml/hr    4.) Hypernatremia, likely due to dehydration: Off D5W, Initially resolved, now sodium 146 on 10/23.    10/24: Continue 1/2 NS as above per Nephrology    - Recheck BMP in am    5.) Acute encephalopathy, etiology unclear, likely related to recent Covid 19 infection: 10/23 saw Worsening mentation today with fevers. Patient likely septic again due to unknown etiology. Infectious causes include rectal wound, pneumonia, PICC line infection. CT head on 10/20/2020 unremarkable. 10/24: Continue to track mentation    6.) Acute on chronic Normocytic anemia due to acute rectal bleeding- secondary to gluteal/rectal ulcerations vs hemodilutional from IVF, (Improving): Hemoglobin of 8.6 today. Baseline Hgb 7-8 in 10/2020, was around 11 in 9/2020. S/p 2 units PRBC. Blood clot seen by nursing staff today likely secondary to rectal ulcer.   Patient does have rectal bleed from rectal ulcer from Flexi-Seal.        10/23: Continue to monitor hemoglobin      - Transfuse for hemoglobin less than 7      - GI on-board. Sign off due to unlikely GI bleed and rectal bleeding likely secondary to ulceration. 10/24: Continue to Monitor bleeding. If rate worsens, consider checking Fibrinogen and PLTs for possible coagulopathy      - Transfuse if <7      - check H-H at 1400, CBC in am     7.) Hypokalemia due to hypomagnesemia and diarrhea, (Resolved): Replace per protocol, BMP in am .     8.) Hypomagnesemia likely due to poor oral intake and diarrhea, (Resolving): Replace per protocol, check magnesium level in am.     9.) Dysphagia, At risk for Malnutrition: Patient failed Fiberoptic Endoscopic Evaluation of the Swallow (FEES). ST recommend NPO, per ST NGT not an option right now due to failed FEES. Started TPN temporarily on 10/21.  plan to repeat FEES on Friday 10/23. However due to patient's fevers and decompensation unable to perform. 10/24: TPN in use, follow Dietary recommendations for nutrition and Free Water     10.) Diarrhea: C. diff test ordered by GI, but not done. May repeat if continue diarrhea.     11.) Sarahi-rectal lesion: Wound ostomy on-board, appreciate input. Cont zinc oxide as ordered.      12. ) Hx of KIARA: Noncompliant with CPAP at home, Pulmonology consulted, using BiPAP. Settings: PEEP 6cm H2O              FiO2 30%              O2 flow rate 2 L/min     13.)  Diabetes mellitus type 2:  Blood sugars within goal range of 140-180. Continue Lantus at current dose and sliding scale insulin. Accu-Cheks q. 4 and at bedtime, Hypoglycemia protocol in place     14.)  Essential hypertension:  Uncontrolled today due to pt not receiving PO meds ( on amlodipine, cardizem and hydralazine PO at home). IV hydralazine prn ordered. Avoid hypotension with recent sepsis and renal failure.     15.) Physical Deconditioning:  Patient likely will need SNF versus LTAC at discharge. Delta evaluation consult ordered.   Palliative care following in discussed case with father who primary decision-maker. Code Status discussion should be made.     16.) Thrombocytosis, likely reactive due to recent COVID-19 (Resolved): , recheck with CBC in am     17.) Mild hypercalcemia, likely from dehydration (Resolved): Ca 9.0, Continue IVF per Nephrology. Recheck with daily BMP.     18.) Urinary retention: On mason cath, check UA and urine culture. 10/24:  No Results back on Urine Culture    Disposition Plan: TBD based on clinical course    History Of Present Illness:      Mr. Deborah Radford is a  44year-old morbidly obese black male lifetime non-smoker. Kaleb Daniel has a history of morbid obesity associated with type 2 diabetes mellitus, prior alcohol abuse, hyperlipidemia, hypertension, hypogonadism, nonalcoholic steatohepatitis, obstructive sleep apnea, and gout.  Patient was hospitalized 9/6/2020 through 9/15/2020 with hypoxemic respiratory failure secondary to COVID-19 associated with diffuse bilateral infiltrates.  At that time, patient received Decadron, remdesivir, and danazol.  During hospitalization, he had issues with atrial fibrillation.  His insulin therapy required adjustment.  He was discharged home on subcutaneous Lovenox. Patient returned back to the emergency room on 9/23/2020 with increasing SOB and progressive hypoxia. CXR showed diffuse infiltrates.  Deteriorated and required intubation. Patient underwent bronchoscopy on 9/27/2020 which demonstrated no mucus production. Patient extubated 10/2/2020. Patient reintubated for progressive respiratory failure with progressive obtundation on 10/6/2020.  This was associated with acute oliguric renal failure.  Patient was intubated 10/6/2020, and underwent volume resuscitation.  Fractional excretion of sodium returned less than 1 which was consistent with prerenal azotemia.  After volume resuscitation, patient demonstrated that he was hemoconcentrated with a drop of 2 g in the hemoglobin.  With volume admission. Diet:  PN-Adult  3 IN 1 Peripheral Line (Custom)  PN-Adult  3 IN 1 Peripheral Line (Custom)    Review of Systems:      Unable to assess due to limited ability to follow commands. Physical exam:    BP (!) 148/87   Pulse 79   Temp 98.6 °F (37 °C) (Oral)   Resp 15   Ht 5' 8\" (1.727 m)   Wt 289 lb 1.6 oz (131.1 kg)   SpO2 100%   BMI 43.96 kg/m²     General appearance: On BiPAP, laying in bed, Unable to stay awake to answer questions, Obese  HEENT:  Normal cephalic, atraumatic without obvious deformity. Pupils equal, round, and reactive to light. Conjunctivae/corneas clear. Neck: Supple, with full range of motion. No jugular venous distention. Trachea midline. Respiratory:  On BiPAP, increased respiratory effort, coarse breath sounds, difficult to hear beyond noise from BiPAP machine. Cardiovascular:  Regular Rate and rhythm with normal S1/S2 without murmurs, rubs or gallops. Abdomen: Soft, non-tender, non-distended with normal bowel sounds. Musculoskeletal:  No clubbing or cyanosis, edema in lower limbs bilaterally. Full range of motion without deformity. Skin: Skin color, texture, turgor normal.  No rashes or lesions. Dry skin around borders of face  Neurologic:  Limited due to inability to follow commands/stay awake  Capillary Refill: Brisk,< 3 seconds   Peripheral Pulses: +2 palpable, equal bilaterally       Labs:     Recent Labs     10/22/20  0652 10/23/20  0442 10/24/20  0320   WBC 11.0* 15.1* 12.8*   HGB 8.1* 8.6* 7.1*   HCT 25.6* 28.5* 24.3*   * 469* 312     Recent Labs     10/22/20  0652 10/23/20  0442 10/24/20  0320    146* 148*   K 3.6 4.3 4.2    108 112*   CO2 25 25 24   BUN 12 17 17   CREATININE 1.8* 1.7* 1.5*   CALCIUM 10.2 10.8* 9.0   PHOS 1.6* 2.8 3.7     Recent Labs     10/22/20  0652 10/23/20  0442 10/24/20  0320   AST 18 20 16   ALT 15 14 14   BILITOT 0.4 0.6 0.5   ALKPHOS 81 85 76     No results for input(s): INR in the last 72 hours.   No results for input(s): Sade Phaman in the last 72 hours. Urinalysis:      Lab Results   Component Value Date    NITRU NEGATIVE 10/06/2020    WBCUA > 200 10/06/2020    BACTERIA FEW 10/06/2020    RBCUA 5-10 10/06/2020    BLOODU LARGE 10/06/2020    SPECGRAV >=1.030 10/06/2020    GLUCOSEU NEGATIVE 08/05/2020       Intake & Output:  I/O last 3 completed shifts: In: 6192.3 [I.V.:6192.3]  Out: 2100 [Urine:2100]  I/O this shift:  In: 8542 [I.V.:556.8]  Out: 700 [Urine:700]      Radiology:     CXR 10/23: I have reviewed the CXR with the following interpretation: Poor inflation of lungs, borderline heart size, no effusions, PICC line right with catheter tip in cavoatrial junction, mild infiltrate scattering in both lungs, worsened appearance from prior study    XR CHEST PORTABLE   Final Result   1. Poor inflation lungs. Borderline heart size. No effusion. 2. Right arm PICC line, catheter tip the cavoatrial junction. 3. Mild infiltrate scattered in both lungs and left infrahilar region. 4. Overall appearance slightly worsened from prior study. **This report has been created using voice recognition software. It may contain minor errors which are inherent in voice recognition technology. **      Final report electronically signed by Dr. Benigno Balderrama on 10/23/2020 10:46 AM      CT ABDOMEN PELVIS WO CONTRAST Additional Contrast? None   Final Result   1. Almost complete resolution of previously seen airspace infiltrates in the lung bases. 2. No acute abdominal or pelvic abnormalities. **This report has been created using voice recognition software. It may contain minor errors which are inherent in voice recognition technology. **      Final report electronically signed by Dr. Kris Mccormick on 10/20/2020 10:50 AM      CT HEAD WO CONTRAST   Final Result    No evidence of acute intracranial abnormality. **This report has been created using voice recognition software.  It may contain minor errors which are inherent in voice recognition technology. **      Final report electronically signed by Dr. Liyah Banks MD on 10/20/2020 10:13 AM      XR CHEST PORTABLE   Final Result   Ill-defined bilateral lung opacities. Follow-up as clinically indicated. This document has been electronically signed by: Casey Barone MD on 10/20/2020 05:38 AM         XR CHEST PORTABLE   Final Result   Minimal residual atelectasis and/or infiltrate within the bilateral mid    and lower lungs with improvement. Improved pulmonary inflation. This document has been electronically signed by: Karen Francis MD on    10/16/2020 02:02 AM         XR CHEST PORTABLE   Final Result   Impression:   Progressive bilateral consolidations or ARDS. This document has been electronically signed by: Adriane Boxer, MD on    10/12/2020 04:59 AM         XR CHEST PORTABLE   Final Result    Similar bilateral ill-defined pneumonia. This document has been electronically signed by: Tito Burroughs. Alina Thomas DO on    10/11/2020 09:49 AM         XR CHEST PORTABLE   Final Result   Similar diffuse patchy bilateral airspace disease and consolidations. Support devices as above. This document has been electronically signed by: Elder Workman MD on    10/10/2020 04:35 AM         XR CHEST PORTABLE   Final Result   No acute findings. This document has been electronically signed by: Casey Barone MD on 10/08/2020 07:40 AM         CT ABDOMEN PELVIS WO CONTRAST Additional Contrast? Oral   Final Result    IMPRESSION:   Bilateral lower lobe pneumonia. Known Covid. Continued progress imaging is advised   Distended colon with fluid, air and stool. Correlate for diarrhea. **This report has been created using voice recognition software. It may contain minor errors which are inherent in voice recognition technology. **      Final report electronically signed by Dr. Gisel Pérez on 10/7/2020 6:49 PM XR CHEST PORTABLE   Final Result   Bilateral lung consolidation not significant change. This document has been electronically signed by: Jami Chilel MD on 10/07/2020 07:25 AM         US RENAL COMPLETE   Final Result   Unremarkable kidneys. This document has been electronically signed by: Bertrand Durán MD on    10/07/2020 01:48 AM         XR CHEST PORTABLE   Final Result   1. There is a new endotracheal tube with the distal tip 1.8 cm above the level of the madi. 2. There is a new esophageal tube with the distal tip projecting over the gastric fundus. 3. There is a stable right PICC with the distal tip projecting over the right atrium. 4. The lung volumes are diminished. There are bilateral perihilar and the basilar opacities which are similar to the previous examination however may be accentuated by the expiratory technique. There is no pleural effusion. Follow-up chest radiographs    are recommended to confirm complete resolution. **This report has been created using voice recognition software. It may contain minor errors which are inherent in voice recognition technology. **      Final report electronically signed by Dr. Rosalyn Dubin on 10/6/2020 7:18 AM      XR CHEST PORTABLE   Final Result   Bilateral lung opacities. Follow-up to clearing recommended. This document has been electronically signed by: Jami Chilel MD on 10/06/2020 06:09 AM         XR CHEST PORTABLE   Final Result   Slightly decreased diffuse patchy bilateral airspace disease. Support devices as above. This document has been electronically signed by: Jorge Berkowitz MD on    10/03/2020 05:15 AM         XR CHEST PORTABLE   Final Result   ET tube should be retracted 1.5-2.0 cm. No significant change in coarse scattered bilateral infiltrates. This document has been electronically signed by:  Sonja Lee MD on    09/30/2020 06:50 AM         XR CHEST PORTABLE   Final Result      Slightly improving bilateral pneumonia. **This report has been created using voice recognition software. It may contain minor errors which are inherent in voice recognition technology. **      Final report electronically signed by Dr. Redd Escoto on 9/27/2020 2:23 PM      XR ABDOMEN FOR NG/OG/NE TUBE PLACEMENT   Final Result   Esophageal route tube tip in the stomach. **This report has been created using voice recognition software. It may contain minor errors which are inherent in voice recognition technology. **      Final report electronically signed by Dr Erickson Ramirez on 9/26/2020 10:22 AM      XR CHEST PORTABLE   Final Result   1. Lines and tubes as above. 2. Worsening bilateral pneumonia. **This report has been created using voice recognition software. It may contain minor errors which are inherent in voice recognition technology. **      Final report electronically signed by Dr. Redd Escoto on 9/24/2020 7:38 AM      XR CHEST PORTABLE   Final Result   1. Endotracheal tube terminates 3.1 cm above the madi. 2.  Orogastric tube in the stomach. 3.  Patchy opacities in the right upper lobe and lingula. This document has been electronically signed by: Adithya Mendoza MD on    09/24/2020 12:35 AM         XR CHEST PORTABLE   Final Result   1. Bilateral pulmonary opacification worse than on previous study dated 10 September 2020. This may represent worsening inflammatory process, possibly Covid 19 infection. Please correlate clinically   2. Borderline cardiomegaly. **This report has been created using voice recognition software. It may contain minor errors which are inherent in voice recognition technology. **      Final report electronically signed by DR Asuncion Daly on 9/23/2020 10:47 AM           DVT prophylaxis: Lovenox    Code Status: Full Code    PT/OT Eval Status: Not Consulted    Active Hospital Problems    Diagnosis Date Noted    Sepsis due to COVID-19 ([facility] [address] [U07.1, A41.89]     KRISTY (acute kidney injury) ([facility] [address] [N17.9]     Hypernatremia [E87.0]     Acute encephalopathy [G93.40]     Acute on chronic anemia [D64.9]     Hypomagnesemia [E83.42]     Dysphagia [R13.10]     Diarrhea [R19.7]     At risk for malnutrition [Z91.89]     Reactive thrombocytosis [R79.89]     Pneumonia due to COVID-19 virus [U07.1, J12.89] 10/18/2020    ARF (acute renal failure) with tubular necrosis (HCC) [N17.0]     Hypokalemia [E87.6]     Other hypotension [I95.89]     Acute respiratory failure with hypoxia (HCC) [J96.01]     Acute respiratory failure due to COVID-19 ([facility] [address] [U07.1, J96.00] 09/06/2020    Essential hypertension [I10]        Thank you Aruna Pacheco MD for the opportunity to be involved in this patient's care.     Electronically signed by Jason Mota DO on 10/24/2020 at 2:06 PM

## 2020-10-24 NOTE — FLOWSHEET NOTE
10/24/20 0612   Provider Notification   Reason for Communication Review case   Provider Name Gala Calderón   Provider Notification Advance Practice Clinician (CNS, NP, CNM, CRNA, PA)   Method of Communication Secure Message   Response See orders   Notification Time [phone] This RN messaged about the pt's hgb this AM it is 7.1, yesterday it was 8.6. No new orders were placed at this time.

## 2020-10-25 LAB
ABO: NORMAL
ALBUMIN SERPL-MCNC: 2.8 G/DL (ref 3.5–5.1)
ALBUMIN SERPL-MCNC: 2.9 G/DL (ref 3.5–5.1)
ALLEN TEST: POSITIVE
ALP BLD-CCNC: 70 U/L (ref 38–126)
ALP BLD-CCNC: 75 U/L (ref 38–126)
ALT SERPL-CCNC: 12 U/L (ref 11–66)
ALT SERPL-CCNC: 12 U/L (ref 11–66)
ANION GAP SERPL CALCULATED.3IONS-SCNC: 10 MEQ/L (ref 8–16)
ANION GAP SERPL CALCULATED.3IONS-SCNC: 8 MEQ/L (ref 8–16)
ANISOCYTOSIS: PRESENT
ANTIBODY SCREEN: NORMAL
AST SERPL-CCNC: 13 U/L (ref 5–40)
AST SERPL-CCNC: 14 U/L (ref 5–40)
BASE EXCESS (CALCULATED): 2.3 MMOL/L (ref -2.5–2.5)
BASOPHILS # BLD: 0.4 %
BASOPHILS # BLD: 0.5 %
BASOPHILS ABSOLUTE: 0 THOU/MM3 (ref 0–0.1)
BASOPHILS ABSOLUTE: 0.1 THOU/MM3 (ref 0–0.1)
BILIRUB SERPL-MCNC: 0.4 MG/DL (ref 0.3–1.2)
BILIRUB SERPL-MCNC: 0.5 MG/DL (ref 0.3–1.2)
BLOOD CULTURE, ROUTINE: ABNORMAL
BLOOD CULTURE, ROUTINE: ABNORMAL
BUN BLDV-MCNC: 14 MG/DL (ref 7–22)
BUN BLDV-MCNC: 15 MG/DL (ref 7–22)
CALCIUM IONIZED: 1.25 MMOL/L (ref 1.12–1.32)
CALCIUM SERPL-MCNC: 9.3 MG/DL (ref 8.5–10.5)
CALCIUM SERPL-MCNC: 9.5 MG/DL (ref 8.5–10.5)
CHLORIDE BLD-SCNC: 104 MEQ/L (ref 98–111)
CHLORIDE BLD-SCNC: 107 MEQ/L (ref 98–111)
CO2: 27 MEQ/L (ref 23–33)
CO2: 28 MEQ/L (ref 23–33)
COLLECTED BY:: ABNORMAL
CREAT SERPL-MCNC: 1.4 MG/DL (ref 0.4–1.2)
CREAT SERPL-MCNC: 1.5 MG/DL (ref 0.4–1.2)
DEVICE: ABNORMAL
EOSINOPHIL # BLD: 4.9 %
EOSINOPHIL # BLD: 5.1 %
EOSINOPHILS ABSOLUTE: 0.5 THOU/MM3 (ref 0–0.4)
EOSINOPHILS ABSOLUTE: 0.6 THOU/MM3 (ref 0–0.4)
ERYTHROCYTE [DISTWIDTH] IN BLOOD BY AUTOMATED COUNT: 17.7 % (ref 11.5–14.5)
ERYTHROCYTE [DISTWIDTH] IN BLOOD BY AUTOMATED COUNT: 18.4 % (ref 11.5–14.5)
ERYTHROCYTE [DISTWIDTH] IN BLOOD BY AUTOMATED COUNT: 61.3 FL (ref 35–45)
ERYTHROCYTE [DISTWIDTH] IN BLOOD BY AUTOMATED COUNT: 63 FL (ref 35–45)
GFR SERPL CREATININE-BSD FRML MDRD: 59 ML/MIN/1.73M2
GFR SERPL CREATININE-BSD FRML MDRD: 64 ML/MIN/1.73M2
GLUCOSE BLD-MCNC: 135 MG/DL (ref 70–108)
GLUCOSE BLD-MCNC: 137 MG/DL (ref 70–108)
GLUCOSE BLD-MCNC: 138 MG/DL (ref 70–108)
GLUCOSE BLD-MCNC: 140 MG/DL (ref 70–108)
GLUCOSE BLD-MCNC: 143 MG/DL (ref 70–108)
GLUCOSE BLD-MCNC: 159 MG/DL (ref 70–108)
GLUCOSE BLD-MCNC: 166 MG/DL (ref 70–108)
GLUCOSE BLD-MCNC: 169 MG/DL (ref 70–108)
HCO3: 27 MMOL/L (ref 23–28)
HCT VFR BLD CALC: 21.7 % (ref 42–52)
HCT VFR BLD CALC: 24.5 % (ref 42–52)
HEMOGLOBIN: 6.4 GM/DL (ref 14–18)
HEMOGLOBIN: 7.3 GM/DL (ref 14–18)
IFIO2: 30
IMMATURE GRANS (ABS): 0.06 THOU/MM3 (ref 0–0.07)
IMMATURE GRANS (ABS): 0.07 THOU/MM3 (ref 0–0.07)
IMMATURE GRANULOCYTES: 0.5 %
IMMATURE GRANULOCYTES: 0.7 %
INR BLD: 1.24 (ref 0.85–1.13)
LEGIONELLA PNEUMOPHILIA AG, URINE: NEGATIVE
LYMPHOCYTES # BLD: 10 %
LYMPHOCYTES # BLD: 10.1 %
LYMPHOCYTES ABSOLUTE: 1 THOU/MM3 (ref 1–4.8)
LYMPHOCYTES ABSOLUTE: 1.1 THOU/MM3 (ref 1–4.8)
MAGNESIUM: 1.8 MG/DL (ref 1.6–2.4)
MCH RBC QN AUTO: 28.3 PG (ref 26–33)
MCH RBC QN AUTO: 28.3 PG (ref 26–33)
MCHC RBC AUTO-ENTMCNC: 29.5 GM/DL (ref 32.2–35.5)
MCHC RBC AUTO-ENTMCNC: 29.8 GM/DL (ref 32.2–35.5)
MCV RBC AUTO: 95 FL (ref 80–94)
MCV RBC AUTO: 96 FL (ref 80–94)
MONOCYTES # BLD: 7.7 %
MONOCYTES # BLD: 8.5 %
MONOCYTES ABSOLUTE: 0.9 THOU/MM3 (ref 0.4–1.3)
MONOCYTES ABSOLUTE: 0.9 THOU/MM3 (ref 0.4–1.3)
NUCLEATED RED BLOOD CELLS: 0 /100 WBC
NUCLEATED RED BLOOD CELLS: 0 /100 WBC
O2 SATURATION: 100 %
ORGANISM: ABNORMAL
PATHOLOGIST REVIEW: ABNORMAL
PCO2: 40 MMHG (ref 35–45)
PH BLOOD GAS: 7.44 (ref 7.35–7.45)
PHOSPHORUS: 3.7 MG/DL (ref 2.4–4.7)
PIP: 22 CMH2O
PLATELET # BLD: 321 THOU/MM3 (ref 130–400)
PLATELET # BLD: 342 THOU/MM3 (ref 130–400)
PLATELET ESTIMATE: ADEQUATE
PMV BLD AUTO: 10.9 FL (ref 9.4–12.4)
PMV BLD AUTO: 11 FL (ref 9.4–12.4)
PO2: 163 MMHG (ref 71–104)
POIKILOCYTES: ABNORMAL
POTASSIUM SERPL-SCNC: 4.4 MEQ/L (ref 3.5–5.2)
POTASSIUM SERPL-SCNC: 4.6 MEQ/L (ref 3.5–5.2)
RBC # BLD: 2.26 MILL/MM3 (ref 4.7–6.1)
RBC # BLD: 2.58 MILL/MM3 (ref 4.7–6.1)
RH FACTOR: NORMAL
SCAN OF BLOOD SMEAR: NORMAL
SEG NEUTROPHILS: 75.5 %
SEG NEUTROPHILS: 76.1 %
SEGMENTED NEUTROPHILS ABSOLUTE COUNT: 7.7 THOU/MM3 (ref 1.8–7.7)
SEGMENTED NEUTROPHILS ABSOLUTE COUNT: 8.4 THOU/MM3 (ref 1.8–7.7)
SET PEEP: 6 MMHG
SET PRESS SUPP: 16 CMH2O
SET RESPIRATORY RATE: 12 BPM
SODIUM BLD-SCNC: 141 MEQ/L (ref 135–145)
SODIUM BLD-SCNC: 143 MEQ/L (ref 135–145)
SOURCE, BLOOD GAS: ABNORMAL
STREP PNEUMO AG, UR: NEGATIVE
TOTAL PROTEIN: 6 G/DL (ref 6.1–8)
TOTAL PROTEIN: 6 G/DL (ref 6.1–8)
VANCOMYCIN TROUGH: 15.3 UG/ML (ref 5–15)
WBC # BLD: 10.2 THOU/MM3 (ref 4.8–10.8)
WBC # BLD: 11.1 THOU/MM3 (ref 4.8–10.8)

## 2020-10-25 PROCEDURE — 85025 COMPLETE CBC W/AUTO DIFF WBC: CPT

## 2020-10-25 PROCEDURE — C9113 INJ PANTOPRAZOLE SODIUM, VIA: HCPCS | Performed by: FAMILY MEDICINE

## 2020-10-25 PROCEDURE — 2580000003 HC RX 258: Performed by: STUDENT IN AN ORGANIZED HEALTH CARE EDUCATION/TRAINING PROGRAM

## 2020-10-25 PROCEDURE — 2580000003 HC RX 258: Performed by: FAMILY MEDICINE

## 2020-10-25 PROCEDURE — 99291 CRITICAL CARE FIRST HOUR: CPT | Performed by: INTERNAL MEDICINE

## 2020-10-25 PROCEDURE — 6370000000 HC RX 637 (ALT 250 FOR IP): Performed by: FAMILY MEDICINE

## 2020-10-25 PROCEDURE — 2500000003 HC RX 250 WO HCPCS: Performed by: FAMILY MEDICINE

## 2020-10-25 PROCEDURE — 94761 N-INVAS EAR/PLS OXIMETRY MLT: CPT

## 2020-10-25 PROCEDURE — 94660 CPAP INITIATION&MGMT: CPT

## 2020-10-25 PROCEDURE — 82803 BLOOD GASES ANY COMBINATION: CPT

## 2020-10-25 PROCEDURE — 84100 ASSAY OF PHOSPHORUS: CPT

## 2020-10-25 PROCEDURE — 2700000000 HC OXYGEN THERAPY PER DAY

## 2020-10-25 PROCEDURE — 2000000000 HC ICU R&B

## 2020-10-25 PROCEDURE — 82948 REAGENT STRIP/BLOOD GLUCOSE: CPT

## 2020-10-25 PROCEDURE — 6360000002 HC RX W HCPCS: Performed by: STUDENT IN AN ORGANIZED HEALTH CARE EDUCATION/TRAINING PROGRAM

## 2020-10-25 PROCEDURE — 86900 BLOOD TYPING SEROLOGIC ABO: CPT

## 2020-10-25 PROCEDURE — 83735 ASSAY OF MAGNESIUM: CPT

## 2020-10-25 PROCEDURE — 94640 AIRWAY INHALATION TREATMENT: CPT

## 2020-10-25 PROCEDURE — 36415 COLL VENOUS BLD VENIPUNCTURE: CPT

## 2020-10-25 PROCEDURE — 82330 ASSAY OF CALCIUM: CPT

## 2020-10-25 PROCEDURE — 80202 ASSAY OF VANCOMYCIN: CPT

## 2020-10-25 PROCEDURE — 2500000003 HC RX 250 WO HCPCS: Performed by: PHARMACIST

## 2020-10-25 PROCEDURE — 36430 TRANSFUSION BLD/BLD COMPNT: CPT

## 2020-10-25 PROCEDURE — P9016 RBC LEUKOCYTES REDUCED: HCPCS

## 2020-10-25 PROCEDURE — 99233 SBSQ HOSP IP/OBS HIGH 50: CPT | Performed by: NURSE PRACTITIONER

## 2020-10-25 PROCEDURE — 86923 COMPATIBILITY TEST ELECTRIC: CPT

## 2020-10-25 PROCEDURE — 86901 BLOOD TYPING SEROLOGIC RH(D): CPT

## 2020-10-25 PROCEDURE — 85610 PROTHROMBIN TIME: CPT

## 2020-10-25 PROCEDURE — 80053 COMPREHEN METABOLIC PANEL: CPT

## 2020-10-25 PROCEDURE — 86850 RBC ANTIBODY SCREEN: CPT

## 2020-10-25 PROCEDURE — 36600 WITHDRAWAL OF ARTERIAL BLOOD: CPT

## 2020-10-25 PROCEDURE — 99232 SBSQ HOSP IP/OBS MODERATE 35: CPT | Performed by: INTERNAL MEDICINE

## 2020-10-25 PROCEDURE — 6360000002 HC RX W HCPCS: Performed by: FAMILY MEDICINE

## 2020-10-25 RX ORDER — 0.9 % SODIUM CHLORIDE 0.9 %
20 INTRAVENOUS SOLUTION INTRAVENOUS ONCE
Status: COMPLETED | OUTPATIENT
Start: 2020-10-25 | End: 2020-10-25

## 2020-10-25 RX ORDER — PANTOPRAZOLE SODIUM 40 MG/10ML
40 INJECTION, POWDER, LYOPHILIZED, FOR SOLUTION INTRAVENOUS 2 TIMES DAILY
Status: DISCONTINUED | OUTPATIENT
Start: 2020-10-25 | End: 2020-11-06 | Stop reason: HOSPADM

## 2020-10-25 RX ORDER — LEUCINE, PHENYLALANINE, LYSINE, METHIONINE, ISOLEUCINE, VALINE, HISTIDINE, THREONINE, TRYPTOPHAN, ALANINE, GLYCINE, ARGININE, PROLINE, SERINE, TYROSINE, DEXTROSE 365; 280; 290; 200; 300; 290; 240; 210; 90; 1035; 515; 575; 340; 250; 20; 15 MG/100ML; MG/100ML; MG/100ML; MG/100ML; MG/100ML; MG/100ML; MG/100ML; MG/100ML; MG/100ML; MG/100ML; MG/100ML; MG/100ML; MG/100ML; MG/100ML; MG/100ML; G/100ML
2000 INJECTION INTRAVENOUS CONTINUOUS
Status: DISPENSED | OUTPATIENT
Start: 2020-10-25 | End: 2020-10-25

## 2020-10-25 RX ORDER — MESALAMINE 1000 MG/1
500 SUPPOSITORY RECTAL 2 TIMES DAILY
Status: DISCONTINUED | OUTPATIENT
Start: 2020-10-25 | End: 2020-10-25 | Stop reason: RX

## 2020-10-25 RX ADMIN — PANTOPRAZOLE SODIUM 40 MG: 40 INJECTION, POWDER, FOR SOLUTION INTRAVENOUS at 20:27

## 2020-10-25 RX ADMIN — SODIUM CHLORIDE: 234 INJECTION INTRAMUSCULAR; INTRAVENOUS; SUBCUTANEOUS at 17:19

## 2020-10-25 RX ADMIN — LEUCINE, PHENYLALANINE, LYSINE, METHIONINE, ISOLEUCINE, VALINE, HISTIDINE, THREONINE, TRYPTOPHAN, ALANINE, GLYCINE, ARGININE, PROLINE, SERINE, TYROSINE, DEXTROSE 2000 ML: 365; 280; 290; 200; 300; 290; 240; 210; 90; 1035; 515; 575; 340; 250; 20; 15 INJECTION INTRAVENOUS at 08:52

## 2020-10-25 RX ADMIN — PIPERACILLIN AND TAZOBACTAM 3.38 G: 3; .375 INJECTION, POWDER, LYOPHILIZED, FOR SOLUTION INTRAVENOUS at 02:48

## 2020-10-25 RX ADMIN — GLYCOPYRROLATE AND FORMOTEROL FUMARATE 2 PUFF: 9; 4.8 AEROSOL, METERED RESPIRATORY (INHALATION) at 21:39

## 2020-10-25 RX ADMIN — PANTOPRAZOLE SODIUM 40 MG: 40 INJECTION, POWDER, FOR SOLUTION INTRAVENOUS at 11:20

## 2020-10-25 RX ADMIN — PIPERACILLIN AND TAZOBACTAM 3.38 G: 3; .375 INJECTION, POWDER, LYOPHILIZED, FOR SOLUTION INTRAVENOUS at 17:13

## 2020-10-25 RX ADMIN — INSULIN LISPRO 2 UNITS: 100 INJECTION, SOLUTION INTRAVENOUS; SUBCUTANEOUS at 17:11

## 2020-10-25 RX ADMIN — INSULIN LISPRO 2 UNITS: 100 INJECTION, SOLUTION INTRAVENOUS; SUBCUTANEOUS at 12:23

## 2020-10-25 RX ADMIN — SODIUM CHLORIDE, PRESERVATIVE FREE 10 ML: 5 INJECTION INTRAVENOUS at 11:14

## 2020-10-25 RX ADMIN — SODIUM CHLORIDE, PRESERVATIVE FREE 10 ML: 5 INJECTION INTRAVENOUS at 20:26

## 2020-10-25 RX ADMIN — INSULIN LISPRO 2 UNITS: 100 INJECTION, SOLUTION INTRAVENOUS; SUBCUTANEOUS at 07:19

## 2020-10-25 RX ADMIN — SODIUM CHLORIDE 20 ML: 9 INJECTION, SOLUTION INTRAVENOUS at 07:19

## 2020-10-25 RX ADMIN — PIPERACILLIN AND TAZOBACTAM 3.38 G: 3; .375 INJECTION, POWDER, LYOPHILIZED, FOR SOLUTION INTRAVENOUS at 11:13

## 2020-10-25 RX ADMIN — VANCOMYCIN HYDROCHLORIDE 1750 MG: 5 INJECTION, POWDER, LYOPHILIZED, FOR SOLUTION INTRAVENOUS at 12:19

## 2020-10-25 ASSESSMENT — PAIN SCALES - GENERAL
PAINLEVEL_OUTOF10: 0

## 2020-10-25 NOTE — FLOWSHEET NOTE
10/25/20 0441   Provider Notification   Reason for Communication Evaluate  (GI bleed. )   Provider Name Dr. Yaneth Pham   Provider Notification Physician   Method of Communication Secure Message   Response Waiting for response   Notification Time 5442   Updating GI that patient was a Rapid Response tonight and was moved to the ICU because of bright red blood and large clots coming from rectum.

## 2020-10-25 NOTE — PLAN OF CARE
Problem: Falls - Risk of:  Goal: Will remain free from falls  Description: Will remain free from falls  Outcome: Ongoing  Note: Call light within reach - pt does not attempt to get out of bed      Problem: Falls - Risk of:  Goal: Absence of physical injury  Outcome: Ongoing  Note: None at present      Problem: Skin Integrity:  Goal: Will show no infection signs and symptoms  Description: Will show no infection signs and symptoms  Note: Open wounds on coccyx      Problem: Skin Integrity:  Goal: Absence of new skin breakdown  Description: Absence of new skin breakdown  Note: No new open areas noted      Problem: Discharge Planning:  Goal: Discharged to appropriate level of care  Description: Discharged to appropriate level of care  Outcome: Ongoing  Note: Requires icu at this time      Problem: Discharge Planning:  Goal: Ability to perform activities of daily living will improve  Description: Ability to perform activities of daily living will improve  Outcome: Not Met This Shift  Note: Pt lethargic - takes encouragement to follow commands etc      Problem: Discharge Planning:  Goal: Participates in care planning  Description: Participates in care planning  Outcome: Ongoing  Note: Pts father is active participant       Problem: Nutrition  Goal: Optimal nutrition therapy  Outcome: Ongoing  Note: On tpn at present      Problem: Musculor/Skeletal Functional Status  Goal: Highest potential functional level  Outcome: Ongoing  Note: Pt continues on bedrest due to condition      Problem: Musculor/Skeletal Functional Status  Goal: Absence of falls  Outcome: Ongoing  Note: None at present - call light within reach      Problem: Serum Glucose Level - Abnormal:  Goal: Ability to maintain appropriate glucose levels will improve  Description: Ability to maintain appropriate glucose levels will improve  Outcome: Ongoing  Note: Requires sliding scale insulin      Problem: Confusion - Acute:  Goal: Absence of continued neurological deterioration signs and symptoms  Description: Absence of continued neurological deterioration signs and symptoms  Outcome: Ongoing  Note: Oriented to person , continues disoriented to place, time, situation      Problem: Confusion - Acute:  Goal: Mental status will be restored to baseline  Description: Mental status will be restored to baseline  Outcome: Ongoing  Note: Continues confused      Problem: Injury - Risk of, Physical Injury:  Goal: Will remain free from falls  Description: Will remain free from falls  Outcome: Ongoing  Note: Call light within reach - pt does not attempt to get out of bed      Problem: Injury - Risk of, Physical Injury:  Goal: Absence of physical injury  Outcome: Ongoing  Note: None at present      Problem: Mood - Altered:  Goal: Mood stable  Description: Mood stable  Outcome: Ongoing  Note: Pt  flat affect      Problem: Mood - Altered:  Goal: Absence of abusive behavior  Description: Absence of abusive behavior  Outcome: Completed  Note: N/a      Problem: Mood - Altered:  Goal: Verbalizations of feeling emotionally comfortable while being cared for will increase  Description: Verbalizations of feeling emotionally comfortable while being cared for will increase  Outcome: Completed  Note: N/a      Problem: Psychomotor Activity - Altered:  Goal: Absence of psychomotor disturbance signs and symptoms  Description: Absence of psychomotor disturbance signs and symptoms  Outcome: Ongoing  Note: None at present      Problem: Sensory Perception - Impaired:  Goal: Demonstrations of improved sensory functioning will increase  Description: Demonstrations of improved sensory functioning will increase  Outcome: Ongoing  Note: N/a      Problem: Sensory Perception - Impaired:  Goal: Able to refrain from responding to false sensory perceptions  Description: Able to refrain from responding to false sensory perceptions  Outcome: Ongoing  Note: N/a      Problem: Sensory Perception - Impaired:  Goal:

## 2020-10-25 NOTE — PROGRESS NOTES
TPN Follow Up Note    Assessment: Patient remains NPO, transferred to ICU d/t PRBR and decreasing hemoglobin  Electrolyte Replacement: none    TPN changes for (today) at 1800:   No change in current  3 in 1 TPN to resume tonight at 1800 (patient had clinimix 5/15 interim d/t contaminated line)    Re-check BMP, Mg, PO4, iCa 10/26/2020

## 2020-10-25 NOTE — PROGRESS NOTES
Pharmacy Vancomycin Consult     Vancomycin Day: 3  Current Dosing: Vancomycin 1750mg q24h    Temp max:  99.4    Recent Labs     10/25/20  0310 10/25/20  0840   BUN 15 14       Recent Labs     10/25/20  0310 10/25/20  0840   CREATININE 1.5* 1.4*       Recent Labs     10/25/20  0310 10/25/20  0840   WBC 11.1* 10.2         Intake/Output Summary (Last 24 hours) at 10/25/2020 1431  Last data filed at 10/25/2020 1415  Gross per 24 hour   Intake 6027.9 ml   Output 3600 ml   Net 2427.9 ml       Cultures/Sensitivites  Date Source Result   10/12/2020 ETT MRSA   10/12/2020 PNA PCR panel negative   10/22/2020 BC1 Staph coag neg  Biofire -nothing detected       Ht Readings from Last 1 Encounters:   09/23/20 5' 8\" (1.727 m)        Wt Readings from Last 1 Encounters:   10/25/20 297 lb 14.4 oz (135.1 kg)         Body mass index is 45.3 kg/m². Estimated Creatinine Clearance: 83 mL/min (A) (based on SCr of 1.4 mg/dL (H)).     Trough: 15.3 mcg/mL    Assessment/Plan:  No change in current vancomycin dose or interval  UO adequate  Serum creatinine stable

## 2020-10-25 NOTE — CONSULTS
CRITICAL CARE CONSULT NOTE      Patient:  Brenda [facility]    Unit/Bed:4D-06/006-A  YOB: 1970  MRN: [id number]   PCP: Damion Lazo MD  Date of Admission: 9/23/2020  Chief Complaint:-Melena stool, hematochezia    Assessment and Plan:    1. Acute hematochezia : with clots. Concerns of LGI Bleed vs Acute rectal bleeding. Noted history of such earlier in the month secondary to over inflated Flexiseal rectal balloon. GI felt likely an ulcer from the balloon. CT of ABD/PELVIS was completed at that time which did exclude retroperitoneal hemorrhage. GI was re-consulted. Will transfuse with PRBCs and start Levophed if needed. 2. Hypovolemic shock:  Secondary to above. Patient was responsive to 1L 0.9NS and PRBC. Levophed will be available if needed to maintain MAP>65.    3. Acute on chronic blood loss anemia:  Secondary to above. Target H/H 8  4. Acute respiratory failure, hypoxia:  Patient was extubated 10/2/2020 and has required continuous Bipap. He was considered high risk for tracheostomy. Will continue at this time. Patient is following commands and answering questions appropriately no acute need for intubation at this time. 5. Recent COVID:  With complications of ALI, pneumonia and sepsis. Will monitor. 6. HFpEF:  Stable, at time of transfer will continue to monitor   7. KIARA:  History, secondary to morbid obesity hypoventilation syndrome, monitor. 8. Acute kidney injury:  Nonoliguic, noted improvement. (10/6-present Crea 4.1-1.4) dose medication to GFR no NSAIDS to be given. Nephrology to follow. 9. MRSA colonization: follow, ID did evaluate. Noted intermittent fevers, blood culture from peripheral vein showing gram positive but not identified by bio-fire ?skin colonization. Will continue to monitor.      INITIAL H AND P AND ICU COURSE:  Nay Judd is a 78-year-old can American male transferred to [address] ICU on 10/25/2020 with an acute melena-hematochezia stool. Patient has a significant past medical history of lifetime nonsmoker, morbid obesity associated with type 2 diabetes mellitus, prior alcohol abuse, hyperlipidemia, hypertension, hypogonadism, nonalcoholic steatohepatitis, obstructive sleep apnea, and gout.  Patient was hospitalized 9/6/2020 through 9/15/2020 with hypoxemic respiratory failure secondary to COVID-19 associated with diffuse bilateral infiltrates.  At that time, patient received Decadron, remdesivir, and danazol.  During hospitalization, he had issues with atrial fibrillation.  His insulin therapy required adjustment.  He was discharged home on subcutaneous Lovenox. Patient returned back to the emergency room on 9/23/2020 with increasing SOB and progressive hypoxia. CXR showed diffuse infiltrates. Deteriorated and required intubation. Patient underwent bronchoscopy on 9/27/2020 which demonstrated no mucus production. Patient extubated 10/2/2020. Patient reintubated for progressive respiratory failure with progressive obtundation on 10/6/2020.  This was associated with acute oliguric renal failure.  Patient was intubated 10/6/2020, and underwent volume resuscitation.  Fractional excretion of sodium returned less than 1 which was consistent with prerenal azotemia.  After volume resuscitation, patient demonstrated that he was hemoconcentrated with a drop of 2 g in the hemoglobin. With volume resuscitation, urine output did improve. After patient was intubated on 10/6/2020, he underwent pulmonary lavage which showed MRSA on the PCR but no other organism.  However, patient was found to have greater than 200 white blood cells in the urine.  Patient was treated with Zosyn and doxycycline. Previously, patient had received vancomycin and developed fever while on vancomycin. Therefore vancomycin was not continued.  Sputum subsequently grew staph aureus.  Zosyn was discontinued but doxycycline continued on 10/14/2020.  Patient did well with spontaneous breathing trial and was extubated 10/15/2020. 10/19 /2020 transferred to [facility] ICU stepdown. 10/19/2020 noted GI was re- consulted for passing stools with red blood and clots supportive care was advised. Noted serial H/H monitoring  8.6-7.1.10/25 Rapid Response was called with large maroon blood stools with clots. Hypotension and tachycardia was noted and did improve with 0.9NS fluid resuscitation and PRBC transfusion. Patient was transferred to the ICU for further management and care.      Past Medical History: See HPI. Family History: Mother -coronary artery disease, father alive. Social History: Time non-smoker, denies any alcohol or illicit drug use. Disabled,     ROS   GENERAL: Negative for fatigue, weakness, for low-grade fever  SKN: For ecchymosis noted on abdomen, no lesions or rashes. HEAD: No headaches or recent injury  EYES: No acute changes in vision, no diplopia or blurred vision, no drainage  EARS: No hearing loss, no tinnitus, no drainage  NOSE/THROAT: No rhinorrhea or pharyngitis, no nasal drainage  NECK: No lumps or unusual neck stiffness  PULMONARY: No for mild tachypnea-BiPAP use, no dyspnea, orthopnea or paroxysmal nocturnal dyspnea, stridor or wheezing  CARDIAC: For hypotension no chest pain, pressure, or tightness. GI: For melena, hematochezia-stools. Denies any abdominal pain. No nausea or vomiting.     : No hematuria,   PERIPHERAL VASCULAR: No intermittent claudication or unusual leg cramps  MUSCULOSKELETAL: Occasional arthralgias, myalgias  NEUROLOGICAL: No  Seizures or Syncope  HEMATOLOGIC: Positive for anemia, and unusual bleeding  PSYCH: No homicidal or suicidal ideations    Scheduled Meds:   sodium chloride  20 mL Intravenous Once    pantoprazole  40 mg Intravenous BID    norepinephrine        vancomycin  1,750 mg Intravenous Q24H    vancomycin (VANCOCIN) intermittent dosing (placeholder)   Other RX Placeholder    sodium chloride flush  10 mL Intravenous 2 count 11.1 hemoglobin 6.4 hematocrit is 21.7 platelet count 890   INR 1.24   ABG-10/23/2020-pH 7.45 PCO2 38 PO2 67 bicarb 26   1/20/2020 CT abdomen and pelvis-no evidence of GI bleeding. No bowel obstruction. Liver and spleen are normal.  Gallbladder appears normal.  Perinephric stranding. No hydronephrosis. No renal calculi.  ECHO 9/24/2020 LVEF 60%-mild to moderate left ear with valve area 1.5   Cardiac telemetry normal sinus rhythm        Seen with multidisciplinary ICU team yes. Meets Continued ICU Level Care Criteria:    [x] Yes   [] No - Transfer Planned to listed location:  [] HOSPITALIST CONTACTED-      Case and plan discussed with Dr. Gogo Colmenares.         Electronically signed by SANDRA Bruno - CNP  CRITICAL CARE SPECIALIST

## 2020-10-25 NOTE — PROGRESS NOTES
Renal Progress Note  [facility]    Patient :  Alba Mitchell; 44 y.o. MRN# [id number]  Location:  4D-06/006-A  Attending:  Tina Jose DO  Admit Date:  9/23/2020   Hospital Day: 28      Subjective:     Nephrology is following the patient for KRISTY. Patient seen and examined. Transferred to ICU overnight due to GI Bleed. Hgb this morning 6.4 receiving 1 unit PRBC. On nasal canula. Bps stable. Outpatient Medications:     Medications Prior to Admission: vitamin C (ASCORBIC ACID) 500 MG tablet, Take 2 tablets by mouth daily  enoxaparin (LOVENOX) 120 MG/0.8ML injection, Inject 0.87 mLs into the skin every 12 hours  aspirin 81 MG chewable tablet, Take 1 tablet by mouth daily  CHOLECALCIFEROL PO, Take 2,000 Units by mouth daily  atorvastatin (LIPITOR) 40 MG tablet, Take 40 mg by mouth nightly  acetaminophen (TYLENOL) 325 MG tablet, Take 650 mg by mouth every 4 hours as needed for Pain  benzocaine (BABY ORAJEL) 7.5 % oral gel, Take by mouth 4 times daily as needed for Pain (mouth pain) Take by mouth 3 times daily. guaiFENesin (ROBITUSSIN) 100 MG/5ML syrup, Take 200 mg by mouth every 4 hours as needed for Cough  oxyCODONE-acetaminophen (PERCOCET) 5-325 MG per tablet, Take 1 tablet by mouth every 4 hours as needed for Pain.    busPIRone (BUSPAR) 10 MG tablet, Take 10 mg by mouth 2 times daily   Trolamine Salicylate (ASPERCREME) 10 % LOTN, Apply topically as needed for Pain  pantoprazole (PROTONIX) 40 MG tablet, Take 1 tablet by mouth daily  allopurinol (ZYLOPRIM) 300 MG tablet, Take 1 tablet by mouth daily (Patient taking differently: Take 500 mg by mouth daily )  allopurinol (ZYLOPRIM) 100 MG tablet, Take 2 tablets by mouth daily  lidocaine (XYLOCAINE) 5 % ointment, Apply topically as needed to painful areas on lower back, hips, knees, and feet  esomeprazole Magnesium (NEXIUM) 20 MG PACK, Take 20 mg by mouth daily  hydrALAZINE (APRESOLINE) 50 MG tablet, Take 50 mg by mouth 2 times daily Wound Dressings (MAXORB EX), Apply topically  polyethylene glycol (GLYCOLAX) powder, Take 17 g by mouth every other day   Multiple Vitamins-Minerals (THERAPEUTIC MULTIVITAMIN-MINERALS) tablet, Take 1 tablet by mouth daily  Diaper Rash Products (PINXAV) OINT, Apply topically  Probiotic Product (SOBIA-BID PROBIOTIC PO), Take 1 tablet by mouth daily   ondansetron (ZOFRAN) 4 MG tablet, Take 4 mg by mouth every 8 hours as needed for Nausea or Vomiting  diltiazem (CARDIZEM) 60 MG tablet, Take 60 mg by mouth 2 times daily  tamsulosin (FLOMAX) 0.4 MG capsule, Take 0.4 mg by mouth nightly   folic acid (FOLVITE) 1 MG tablet, Take 1 mg by mouth daily  furosemide (LASIX) 40 MG tablet, Take 40 mg by mouth daily  gabapentin (NEURONTIN) 400 MG capsule, Take 800 mg by mouth 2 times daily. insulin lispro (HUMALOG) 100 UNIT/ML injection vial, Inject into the skin 3 times daily (before meals) Per scale: 151-200=1 unit, 201-250=2 units, 251-300-3 units, 301-350=4 units, 351-400=5 units. sitaGLIPtan-metformin (JANUMET)  MG per tablet, Take 1 tablet by mouth 2 times daily (with meals)  insulin glargine (LANTUS) 100 UNIT/ML injection vial, Inject 10 Units into the skin nightly   senna (SENOKOT) 8.6 MG tablet, Take 1 tablet by mouth 2 times daily  diazepam (VALIUM) 5 MG tablet, Take 5 mg by mouth every 6 hours as needed for Anxiety. ARIPiprazole (ABILIFY PO), Take 15 mg by mouth daily   amlodipine (NORVASC) 10 MG tablet, Take 5 mg by mouth daily   Citalopram Hydrobromide (CELEXA PO), Take 30 mg by mouth daily From [facility] professional services   Blood Glucose Monitoring Suppl (FREESTYLE LITE) ARTIE, Patient needs all supplies for qd testing.  DX: 250.00    Current Medications:     Scheduled Meds:    pantoprazole  40 mg Intravenous BID    norepinephrine        vancomycin  1,750 mg Intravenous Q24H    vancomycin (VANCOCIN) intermittent dosing (placeholder)   Other RX Placeholder    sodium chloride flush  10 mL Intravenous 2 times per day    ferrous sulfate  325 mg Oral BID     insulin lispro  0-12 Units Subcutaneous Q6H    piperacillin-tazobactam  3.375 g Intravenous Q8H    [Held by provider] gabapentin  400 mg Oral BID    [Held by provider] modafinil  100 mg Oral Daily    [Held by provider] enoxaparin  40 mg Subcutaneous BID    insulin glargine  38 Units Subcutaneous Nightly    lidocaine 1 % injection  5 mL Intradermal Once    magnesium replacement protocol   Other RX Placeholder    phosphorus replacement protocol   Other RX Placeholder    calcium replacement protocol   Other RX Placeholder    [Held by provider] ARIPiprazole  15 mg Oral Daily    [Held by provider] aspirin  81 mg Oral Daily    glycopyrrolate-formoterol  2 puff Inhalation BID     Continuous Infusions:    norepinephrine      CLINIMIX 5/15      PN-Adult  3 IN 1 Central Line (Custom) 100 mL/hr at 10/24/20 1846    dextrose       PRN Meds:  sodium chloride flush, hydrALAZINE, acetaminophen, potassium chloride **OR** potassium alternative oral replacement **OR** potassium chloride, potassium chloride, lidocaine, acetaminophen **OR** [DISCONTINUED] acetaminophen, polyethylene glycol, promethazine **OR** ondansetron, albuterol, glucose, dextrose, glucagon (rDNA), dextrose    Input/Output:       I/O last 3 completed shifts: In: 6246.9 [I.V.:5356.7; Blood:400]  Out: 3500 [Urine:3500].       Patient Vitals for the past 96 hrs (Last 3 readings):   Weight   10/25/20 0521 297 lb 14.4 oz (135.1 kg)   10/25/20 0300 288 lb 6.4 oz (130.8 kg)   10/24/20 0308 289 lb 1.6 oz (131.1 kg)       Vital Signs:   Temperature:  Temp: 98.5 °F (36.9 °C)  TMax:   Temp (24hrs), Av.7 °F (37.1 °C), Min:98 °F (36.7 °C), Max:99.1 °F (37.3 °C)    Respirations:  Resp: 13  Pulse:   Pulse: 93  BP:    BP: 101/84  BP Range: Systolic (23CKN), IZD:578 , Min:91 , QUP:776       Diastolic (18VAE), WIR:09, Min:54, Max:91      Physical Examination:     General:  Awake, alert, morbidly obese  HEENT: NC/AT/ MMM  Chest:               diminished  Cardiac:  S1 S2   Abdomen: Soft, non-tender,  SKIN:  No rashes, good skin turgor. Extremities:  No edema, no cyanosis    Labs:       Recent Labs     10/23/20  0442 10/24/20  0320 10/25/20  0310   WBC 15.1* 12.8* 11.1*   RBC 3.08* 2.56* 2.26*   HGB 8.6* 7.1* 6.4*   HCT 28.5* 24.3* 21.7*   MCV 92.5 94.9* 96.0*   MCH 27.9 27.7 28.3   MCHC 30.2* 29.2* 29.5*   * 312 342   MPV 10.7 10.3 10.9      BMP:   Recent Labs     10/23/20  0442 10/24/20  0320 10/25/20  0310   * 148* 141   K 4.3 4.2 4.6    112* 104   CO2 25 24 27   BUN 17 17 15   CREATININE 1.7* 1.5* 1.5*   GLUCOSE 144* 162* 159*   CALCIUM 10.8* 9.0 9.3      Phosphorus:     Recent Labs     10/23/20  0442 10/24/20  0320 10/25/20  0310   PHOS 2.8 3.7 3.7     Magnesium:    Recent Labs     10/23/20  0442 10/24/20  0320 10/25/20  0310   MG 1.7 1.8 1.8     Albumin:    Recent Labs     10/23/20  0442 10/24/20  0320 10/25/20  0310   LABALBU 3.2* 2.6* 2.8*     BNP:    No results found for: BNP  MARÍA:    No results found for: MARÍA  SPEP:  Lab Results   Component Value Date    PROT 6.0 10/25/2020     UPEP:   No results found for: LABPE  C3:   No results found for: C3  C4:   No results found for: C4  MPO ANCA:   No results found for: MPO  PR3 ANCA:   No results found for: PR3  Anti-GBM:   No results found for: GBMABIGG  Hep BsAg:         Lab Results   Component Value Date    HEPBSAG Negative 10/15/2019     Hep C AB:          Lab Results   Component Value Date    HEPCAB Negative 10/15/2019       Urinalysis/Chemistries:      Lab Results   Component Value Date    NITRU NEGATIVE 10/06/2020    COLORU DARK YELLOW 10/06/2020    PHUR 5.0 10/06/2020    LABCAST 0-4 FINE GRAN 10/06/2020    LABCAST 0-4 C. GRAN 10/06/2020    WBCUA > 200 10/06/2020    RBCUA 5-10 10/06/2020    YEAST FEW BUDDING 09/23/2020    BACTERIA FEW 10/06/2020    SPECGRAV >=1.030 10/06/2020    LEUKOCYTESUR SMALL 10/06/2020    LEUKOCYTESUR NEGATIVE 09/23/2020    UROBILINOGEN 1.0 10/06/2020    BILIRUBINUR MODERATE 10/06/2020    BLOODU LARGE 10/06/2020    GLUCOSEU NEGATIVE 08/05/2020    KETUA TRACE 10/06/2020    AMORPHOUS URATES 10/06/2020     Urine Sodium:   No results found for: MICAELA  Urine Potassium:  No results found for: KUR  Urine Chloride:  No results found for: CLUR  Urine Osmolarity:   Lab Results   Component Value Date    OSMOU 713 09/23/2020     Urine Protein:   No components found for: TOTALPROTEIN, URINE   Urine Creatinine:   No results found for: LABCREA  Urine Eosinophils:  No components found for: UEOS        Impression and Plan:  1. KRISTY: overall improved. 2. Hypernatremia: better. He is receiving TPN will maintain off maintenance IV fluids  3. Hypokalemia improved through KCl in TPN  4. + blood culture  5. Acute blood loss anemia receiving PRBC transfusion  6. GI Bleed: GI consulted  7. Hypercalcemia, improved, ionized calcium is normal  8. DM  9. Recent COVID-19 infection  10. Acute hypoxic resp failure            Please don't hesitate to call with any questions.   Electronically signed by Baron Frost DO on 10/25/2020 at 8:44 AM

## 2020-10-26 LAB
ANION GAP SERPL CALCULATED.3IONS-SCNC: 7 MEQ/L (ref 8–16)
BUN BLDV-MCNC: 12 MG/DL (ref 7–22)
CALCIUM IONIZED: 1.27 MMOL/L (ref 1.12–1.32)
CALCIUM SERPL-MCNC: 9.7 MG/DL (ref 8.5–10.5)
CHLORIDE BLD-SCNC: 104 MEQ/L (ref 98–111)
CO2: 30 MEQ/L (ref 23–33)
CREAT SERPL-MCNC: 1.5 MG/DL (ref 0.4–1.2)
ERYTHROCYTE [DISTWIDTH] IN BLOOD BY AUTOMATED COUNT: 17.4 % (ref 11.5–14.5)
ERYTHROCYTE [DISTWIDTH] IN BLOOD BY AUTOMATED COUNT: 58.4 FL (ref 35–45)
FERRITIN: 351 NG/ML (ref 22–322)
FOLATE: 17.2 NG/ML (ref 4.8–24.2)
GFR SERPL CREATININE-BSD FRML MDRD: 59 ML/MIN/1.73M2
GLUCOSE BLD-MCNC: 133 MG/DL (ref 70–108)
GLUCOSE BLD-MCNC: 137 MG/DL (ref 70–108)
GLUCOSE BLD-MCNC: 139 MG/DL (ref 70–108)
GLUCOSE BLD-MCNC: 145 MG/DL (ref 70–108)
GLUCOSE BLD-MCNC: 146 MG/DL (ref 70–108)
GLUCOSE BLD-MCNC: 148 MG/DL (ref 70–108)
GLUCOSE BLD-MCNC: 169 MG/DL (ref 70–108)
HCT VFR BLD CALC: 22 % (ref 42–52)
HCT VFR BLD CALC: 22.1 % (ref 42–52)
HCT VFR BLD CALC: 24.4 % (ref 42–52)
HEMOGLOBIN: 6.6 GM/DL (ref 14–18)
HEMOGLOBIN: 6.6 GM/DL (ref 14–18)
HEMOGLOBIN: 7.6 GM/DL (ref 14–18)
IRON SATURATION: 29 % (ref 20–50)
IRON: 48 UG/DL (ref 65–195)
MAGNESIUM: 1.8 MG/DL (ref 1.6–2.4)
MCH RBC QN AUTO: 27.8 PG (ref 26–33)
MCHC RBC AUTO-ENTMCNC: 29.9 GM/DL (ref 32.2–35.5)
MCV RBC AUTO: 93.2 FL (ref 80–94)
PHOSPHORUS: 3.4 MG/DL (ref 2.4–4.7)
PLATELET # BLD: 296 THOU/MM3 (ref 130–400)
PMV BLD AUTO: 10.9 FL (ref 9.4–12.4)
POTASSIUM SERPL-SCNC: 4.5 MEQ/L (ref 3.5–5.2)
RBC # BLD: 2.37 MILL/MM3 (ref 4.7–6.1)
SODIUM BLD-SCNC: 141 MEQ/L (ref 135–145)
TOTAL IRON BINDING CAPACITY: 167 UG/DL (ref 171–450)
VITAMIN B-12: 1102 PG/ML (ref 211–911)
WBC # BLD: 9.4 THOU/MM3 (ref 4.8–10.8)

## 2020-10-26 PROCEDURE — 2580000003 HC RX 258: Performed by: STUDENT IN AN ORGANIZED HEALTH CARE EDUCATION/TRAINING PROGRAM

## 2020-10-26 PROCEDURE — 36415 COLL VENOUS BLD VENIPUNCTURE: CPT

## 2020-10-26 PROCEDURE — C9113 INJ PANTOPRAZOLE SODIUM, VIA: HCPCS | Performed by: FAMILY MEDICINE

## 2020-10-26 PROCEDURE — 83550 IRON BINDING TEST: CPT

## 2020-10-26 PROCEDURE — 6370000000 HC RX 637 (ALT 250 FOR IP): Performed by: INTERNAL MEDICINE

## 2020-10-26 PROCEDURE — 94761 N-INVAS EAR/PLS OXIMETRY MLT: CPT

## 2020-10-26 PROCEDURE — 82607 VITAMIN B-12: CPT

## 2020-10-26 PROCEDURE — 6360000002 HC RX W HCPCS: Performed by: INTERNAL MEDICINE

## 2020-10-26 PROCEDURE — 6360000002 HC RX W HCPCS: Performed by: STUDENT IN AN ORGANIZED HEALTH CARE EDUCATION/TRAINING PROGRAM

## 2020-10-26 PROCEDURE — 2580000003 HC RX 258: Performed by: NURSE PRACTITIONER

## 2020-10-26 PROCEDURE — 85014 HEMATOCRIT: CPT

## 2020-10-26 PROCEDURE — 82330 ASSAY OF CALCIUM: CPT

## 2020-10-26 PROCEDURE — 99232 SBSQ HOSP IP/OBS MODERATE 35: CPT | Performed by: INTERNAL MEDICINE

## 2020-10-26 PROCEDURE — 82746 ASSAY OF FOLIC ACID SERUM: CPT

## 2020-10-26 PROCEDURE — 0DJD8ZZ INSPECTION OF LOWER INTESTINAL TRACT, VIA NATURAL OR ARTIFICIAL OPENING ENDOSCOPIC: ICD-10-PCS | Performed by: INTERNAL MEDICINE

## 2020-10-26 PROCEDURE — 2580000003 HC RX 258: Performed by: FAMILY MEDICINE

## 2020-10-26 PROCEDURE — 80048 BASIC METABOLIC PNL TOTAL CA: CPT

## 2020-10-26 PROCEDURE — 2060000000 HC ICU INTERMEDIATE R&B

## 2020-10-26 PROCEDURE — 2500000003 HC RX 250 WO HCPCS: Performed by: PHARMACIST

## 2020-10-26 PROCEDURE — 36430 TRANSFUSION BLD/BLD COMPNT: CPT

## 2020-10-26 PROCEDURE — 83735 ASSAY OF MAGNESIUM: CPT

## 2020-10-26 PROCEDURE — 3609008400 HC SIGMOIDOSCOPY DIAGNOSTIC: Performed by: INTERNAL MEDICINE

## 2020-10-26 PROCEDURE — 83540 ASSAY OF IRON: CPT

## 2020-10-26 PROCEDURE — 7100000010 HC PHASE II RECOVERY - FIRST 15 MIN: Performed by: INTERNAL MEDICINE

## 2020-10-26 PROCEDURE — 82728 ASSAY OF FERRITIN: CPT

## 2020-10-26 PROCEDURE — 84100 ASSAY OF PHOSPHORUS: CPT

## 2020-10-26 PROCEDURE — 2580000003 HC RX 258: Performed by: INTERNAL MEDICINE

## 2020-10-26 PROCEDURE — 85027 COMPLETE CBC AUTOMATED: CPT

## 2020-10-26 PROCEDURE — 82948 REAGENT STRIP/BLOOD GLUCOSE: CPT

## 2020-10-26 PROCEDURE — 85018 HEMOGLOBIN: CPT

## 2020-10-26 PROCEDURE — 94660 CPAP INITIATION&MGMT: CPT

## 2020-10-26 PROCEDURE — 94640 AIRWAY INHALATION TREATMENT: CPT

## 2020-10-26 PROCEDURE — 6360000002 HC RX W HCPCS: Performed by: FAMILY MEDICINE

## 2020-10-26 RX ORDER — 0.9 % SODIUM CHLORIDE 0.9 %
20 INTRAVENOUS SOLUTION INTRAVENOUS ONCE
Status: COMPLETED | OUTPATIENT
Start: 2020-10-26 | End: 2020-10-26

## 2020-10-26 RX ORDER — SODIUM CHLORIDE 0.9 % (FLUSH) 0.9 %
10 SYRINGE (ML) INJECTION EVERY 12 HOURS SCHEDULED
Status: DISCONTINUED | OUTPATIENT
Start: 2020-10-26 | End: 2020-10-26 | Stop reason: SDUPTHER

## 2020-10-26 RX ORDER — SODIUM CHLORIDE 450 MG/100ML
INJECTION, SOLUTION INTRAVENOUS CONTINUOUS
Status: DISCONTINUED | OUTPATIENT
Start: 2020-10-26 | End: 2020-11-04

## 2020-10-26 RX ORDER — SODIUM CHLORIDE 0.9 % (FLUSH) 0.9 %
10 SYRINGE (ML) INJECTION PRN
Status: DISCONTINUED | OUTPATIENT
Start: 2020-10-26 | End: 2020-10-26 | Stop reason: SDUPTHER

## 2020-10-26 RX ADMIN — PANTOPRAZOLE SODIUM 40 MG: 40 INJECTION, POWDER, FOR SOLUTION INTRAVENOUS at 20:21

## 2020-10-26 RX ADMIN — SODIUM CHLORIDE, PRESERVATIVE FREE 10 ML: 5 INJECTION INTRAVENOUS at 08:27

## 2020-10-26 RX ADMIN — PIPERACILLIN AND TAZOBACTAM 3.38 G: 3; .375 INJECTION, POWDER, LYOPHILIZED, FOR SOLUTION INTRAVENOUS at 02:39

## 2020-10-26 RX ADMIN — PANTOPRAZOLE SODIUM 40 MG: 40 INJECTION, POWDER, FOR SOLUTION INTRAVENOUS at 08:27

## 2020-10-26 RX ADMIN — VANCOMYCIN HYDROCHLORIDE 1750 MG: 5 INJECTION, POWDER, LYOPHILIZED, FOR SOLUTION INTRAVENOUS at 15:35

## 2020-10-26 RX ADMIN — SODIUM CHLORIDE 500 MG: 9 INJECTION, SOLUTION INTRAVENOUS at 15:57

## 2020-10-26 RX ADMIN — INSULIN LISPRO 2 UNITS: 100 INJECTION, SOLUTION INTRAVENOUS; SUBCUTANEOUS at 17:33

## 2020-10-26 RX ADMIN — INSULIN LISPRO 2 UNITS: 100 INJECTION, SOLUTION INTRAVENOUS; SUBCUTANEOUS at 01:33

## 2020-10-26 RX ADMIN — INSULIN GLARGINE 38 UNITS: 100 INJECTION, SOLUTION SUBCUTANEOUS at 20:27

## 2020-10-26 RX ADMIN — PIPERACILLIN AND TAZOBACTAM 3.38 G: 3; .375 INJECTION, POWDER, LYOPHILIZED, FOR SOLUTION INTRAVENOUS at 11:23

## 2020-10-26 RX ADMIN — GLYCOPYRROLATE AND FORMOTEROL FUMARATE 2 PUFF: 9; 4.8 AEROSOL, METERED RESPIRATORY (INHALATION) at 07:44

## 2020-10-26 RX ADMIN — PIPERACILLIN AND TAZOBACTAM 3.38 G: 3; .375 INJECTION, POWDER, LYOPHILIZED, FOR SOLUTION INTRAVENOUS at 18:11

## 2020-10-26 RX ADMIN — INSULIN LISPRO 2 UNITS: 100 INJECTION, SOLUTION INTRAVENOUS; SUBCUTANEOUS at 23:24

## 2020-10-26 RX ADMIN — SODIUM CHLORIDE 20 ML: 9 INJECTION, SOLUTION INTRAVENOUS at 10:06

## 2020-10-26 RX ADMIN — SODIUM CHLORIDE: 234 INJECTION INTRAMUSCULAR; INTRAVENOUS; SUBCUTANEOUS at 17:27

## 2020-10-26 RX ADMIN — SODIUM CHLORIDE, PRESERVATIVE FREE 10 ML: 5 INJECTION INTRAVENOUS at 20:22

## 2020-10-26 ASSESSMENT — ENCOUNTER SYMPTOMS
SINUS PRESSURE: 0
BLOOD IN STOOL: 0
NAUSEA: 0
WHEEZING: 0
VOMITING: 0
SHORTNESS OF BREATH: 0
SORE THROAT: 0
CHEST TIGHTNESS: 0
ABDOMINAL DISTENTION: 0
BACK PAIN: 0
ANAL BLEEDING: 0
COUGH: 0
DIARRHEA: 0
EYE REDNESS: 0

## 2020-10-26 ASSESSMENT — PAIN SCALES - GENERAL
PAINLEVEL_OUTOF10: 0

## 2020-10-26 ASSESSMENT — PAIN SCALES - WONG BAKER
WONGBAKER_NUMERICALRESPONSE: 0
WONGBAKER_NUMERICALRESPONSE: 0

## 2020-10-26 NOTE — PROGRESS NOTES
Pt Name: Teri Herrera  MRN: [id number]  [id number]  YOB: 1970  Admit Date: 9/23/2020  9:02 AM  Date of evaluation: 10/26/2020  Primary Care Physician: Jerri Austin MD   4K-10/010-A     MARQUISE APPLE. Transferred to [address] [place] [address]. Vitals:    10/26/20 0800   BP: 135/85   Pulse: 83   Resp: 16   Temp: 98 °F (36.7 °C)   SpO2: 100%       Body mass index is 44.11 kg/m². Awake but poorly responsive   clear to auscultation bilaterally and on Bipap   regular rate and rhythm   Soft and nondistended with normal BS, nontender   Rectal perianal wounds with ulcers, sarahy bile from anus   no cyanosis, clubbing or edema present      Current Meds    sodium chloride  20 mL Intravenous Once    pantoprazole  40 mg Intravenous BID    vancomycin  1,750 mg Intravenous Q24H    vancomycin (VANCOCIN) intermittent dosing (placeholder)   Other RX Placeholder    sodium chloride flush  10 mL Intravenous 2 times per day    ferrous sulfate  325 mg Oral BID WC    insulin lispro  0-12 Units Subcutaneous Q6H    piperacillin-tazobactam  3.375 g Intravenous Q8H    [Held by provider] gabapentin  400 mg Oral BID    [Held by provider] modafinil  100 mg Oral Daily    [Held by provider] enoxaparin  40 mg Subcutaneous BID    insulin glargine  38 Units Subcutaneous Nightly    lidocaine 1 % injection  5 mL Intradermal Once    magnesium replacement protocol   Other RX Placeholder    phosphorus replacement protocol   Other RX Placeholder    calcium replacement protocol   Other RX Placeholder    [Held by provider] ARIPiprazole  15 mg Oral Daily    [Held by provider] aspirin  81 mg Oral Daily    glycopyrrolate-formoterol  2 puff Inhalation BID      PN-Adult  3 IN 1 Central Line (Custom) 100 mL/hr at 10/25/20 1719    dextrose       Diet: PN-Adult  3 IN 1 Peripheral Line (Custom)    A.   45 yo BM admitted 09/23/20 for fatigue.  Recently admitted 09/06/20 for almost 10 days with hypoxemic respiratory failure secondary to COVID-19. Received Decadron and Danazol. Complained of worsening SOB & progressive hypoxia for which he was intubated. Bronchoscopy 09/27/20. Extubated 10/02/20. 10/06/20 became more obtunded & progressive respiratory failure with acute oliguric renal failure, was re-intubated. Hypotensive requiring pressor support, UO declined. He had a rectal tube for a few weeks which was over-inflated, when it was deflated & removed, he was found to have oozing of bright red blood from the rectum, therefore GI was consulted 10/12/20. It was recommended to hold anticoagulants, keep the rectal tube out, and supportive care. Started on Zyvox. Extubated and placed on BiPAP during the day. [facility] has [facility] from [facility] and will need to follow outpatient with a different GI practice. GI re-consulted 10/25/20 after passing large bloody clot    1. Acute rectal bleeding- secondary to gluteal/rectal ulcerations  2. Acute hypoxemic respiratory failure   3. COVID-19 positive 9/6/20  4. Pneumonia  5. Sepsis   6. KRISTY   7. Hypotension  8. Acute on chronic anemia- stable, s/p 2 units PRBC  9.  DM  10.  H/O HTN  11. KIARA  12. Diastolic heart failure   13. FRANK by labs 10/19/20  14. Chronic diarrhea  15. Very acutely ill patient   16. Dysphagia - OP, failed FEES     Currently on bowel rest with TPN for gluteal wounds      P.       Follow H&H and transfuse prn    Begin mesalamine supp bid - unable, hosp doesn't stock    OK for NGT and TF per GI, but may cause Bipap to not seal well  Would be candidate for PEG if intubated (not on Bipap)    TODAY   - FS with cautery/clip if bleeding continues, but success is low and may lead to more bleeding and would have to be done unsedated on Bipap - high risk  - transfuse  - iv iron  - check labs to see if EPO would be helpful    Leslye Gee MD  9:34 AM 10/26/2020

## 2020-10-26 NOTE — PROGRESS NOTES
Met with patient's father at the bedside. Palliative care introduced. Lengthy discussion about the patient's medical issues and his continued decline in spite of aggressive measures. Dr. Jerman Guan in and updated the patient's father medically from his GI perspective. Patient's father, Flavia Ayers is in agreement for a scope via the rectum to check for bleeding. Flavia Ayers does ask, \"What do we do if everything we are doing isn't helping? \"  Did discuss that there is a high likelihood that the patient may continue to decline. Did discuss code status with Flavia Ayers and Flavia Ayers states that if the patient's heart or breathing stops, he would not want him resuscitated but if he is having difficulty with breathing, he would want the patient intubated. Flavia Ayers asks about the side effects of intubation and we discussed further deconditioning, weaker muscles in the throat and potential inability to wean off of the ventilator. Did discuss thte possibility of a trach if the patient were ever intubated and unable to be weaned. At this point, Flavia Ayers states, \"Do what you can to help him. \"  Flavia Ayers expresses understanding of the patient's condition. Also discussed the likelihood that the patient would not be able to return to an apartment setting for quite some time. Flavia Ayers shares that the patient lived in [facility] and that he wishes that the patient would have stayed there. Flavia Ayers expresses understanding that the patient will not be able to return home. If the patient does improve to discharge, Flavia Ayers is hopeful that the patient could return to [place] as all of the family reside there. Much emotional support provided. Updated Travis Gomez RN. Updated  Ochoa Bucio that patient's father is present and that he will be returning to [place] this morning. Text message sent to Dr. David Kohli regarding code status change. Palliative care will remain available if needs arise. Please call prn.

## 2020-10-26 NOTE — CARE COORDINATION
10/26/20, 6:48 AM EDT    DISCHARGE BARRIERS        Patient transferred to 4K then 4D. Report given to gennaro Cartagena, regarding discharge plan for this patient.

## 2020-10-26 NOTE — CARE COORDINATION
10/26/20, 10:17 AM EDT    DISCHARGE ON GOING [address] day: 33  Location: -10/010-A Reason for admit: Acute respiratory failure with hypoxemia ([facility] [J96.01]  Acute respiratory failure with hypoxia ([facility] [J96.01]  Pneumonia due to COVID-19 virus [U07.1, J12.89]   Procedure:   9/24 CVC left subclavian - 9/30 removed  5/52 PICC right basilic  13/7 Extubated to bipap  10/6 Reintubated  10/15 extubated    Treatment Plan of Care: Rapid response on 4K yesterday, 10/25, with large amount bright red blood with clots per rectum. Was hypotensive, 1L fld bolus and 1 PRBC given and transferred to ICU. Did not require vasopressor - transferred back to  this morning. Plan for Flex sigmoidoscopy today. OK for NG/TF per GI. Hospitalist, GI, ID, and Nephrology following. Palliative Care following. Blood culture x1 +Staph aureus - poss contamination. Respiratory culture +MRSA. Tmax 99.3. NSR. On bipap 22/6 with 30% FIO2, sats 99%. Oriented to self. Follows commands, delayed responses. [place]. IVF, TPN, ferrous sulfate, inhaler, lantus, SSI Q6H, IV venofer x1 today, protonix bid, IV zosyn, IV vancomycin, Electrolyte replacement protocols. Creat 1.5, hgb 6.6 - 1 PRBC ordered. Barriers to Discharge: awaiting precert for Faye  PCP: Yanick Hernandez MD  Readmission Risk Score: 60%  Patient Goals/Plan/Treatment Preferences: From home alone PTA. Plan for [place] [facility]; precert in process. SW on case. Pt transferred to [facility]. Handoff report given to [place] city, 4K CM.

## 2020-10-26 NOTE — PROGRESS NOTES
Hospitalist Progress Note      Patient:  James Velazquez    Unit/Bed:4K-10/010-A  YOB: 1970  MRN: [id number]   Acct: [de-identified]     PCP: Ally Moreno MD  Date of Admission: 9/23/2020     Date of Service: Pt seen/examined on 10/26/20  and Admitted to Inpatient with expected LOS greater than two midnights due to medical therapy. Chief Complaint:  Shortness of Breath    Assessment and Plan:    1.) Sepsis due to Covid Pneumonia and Bacteremia: Initially resolved, now having intermittent fever again and today leukocytosis is worsening, WBC 15.1 from 11 yesterday. Complicated medical course. Recent discharge from [facility] for COVID-19 on 9/15/2020. Readmitted for COVID-19 pneumonia on 9/23/2020, intubation on 9/23/2020 and 10/6/2020. Successfully weaned to room air currently. Per ICU notes patient would be a good candidate for Zyvox, however due to patient's inability to swallow he has been unable to receive Zyvox. 10/23: Has been treated with vancomycin, doxycycline (10/6-10/14), Zosyn (10/6-10/16) throughout his hospital stay. Likely MRSA pneumonia. -Previously had been afebrile however patient febrile to 100.4 on 10/21/2020. Febrile with low grade at 100.0 rectally 10/22/2020. Patient with residual fever morning of 10/23/2020 up to 103.0 °F      -Patient noted to be septic again with fever, tachycardia, and tachypnea. -ID has been consulted. Possible source of infection including recurrent pneumonia, rectal wound, PICC line. -IV Zosyn restarted, ID recommending 5-7 day course. We will add IV vancomycin with pharmacy to dose due to recurrent fevers. -Repeat blood cultures are pending      -Chest x-ray showing worsening infiltrates.        -Will order UA and urine culture, pt is on mason cath for urinary retention, last changed was 10/20 per RN      -Patient unable to perform repeat FEES exam due to decompensation    -Unable to place NG tube previously. If PICC line removed patient may not be able to receive nutrition. Patient is at high risk for decompensation. 10/24: Following ID recommendations, Continue Vanc/Zosyn    - TPN started, IVF 1/2 NS at 60 ml/hr    - Blood Culture (+) for Gram Positive cocci in clusters    - Common Respiratory Panel negative    - Lactic Acid normal, ProCal 4.58   10/26: Following ID recommendations, Continue Vanc/Zosyn    - Strep Pneumonia and Legionella Antibody negative    - Trend CBC daily    2.) Acute hypoxic respiratory failure due to COVID-19 pneumonia:  Intubated on 10/6/2020, extubated on 10/15/2020.    10/23: Currently saturating well on room air, however patient became more tachypneic    10/24: Patient on 2L NC    10/26: Still requiring BiPAP continuously     3.) KRISTY due to hypotension (Improving):  Creatinine 1.1 on admission. Trended up to 4.1 on 10/7/2020. Baseline creatinine 0.6 to 0.8. Nephrology following. Appreciate nephrology input. 10/23: Creatinine stable at 1.7 today    - Nephrology recommending continuing IV hydration    10/24: Cr 1.5 today, trending down    - Nephrology switched to 1/2 NS at 60 ml/hr    10/26: Cr 1.5, Stable, Nephrology states this may be new baseline    - As needed diuretics    - IVF to prevent Hypotension    4.) Hypernatremia, likely due to dehydration: Off D5W, Initially resolved, now sodium 146 on 10/23.    10/24: Continue 1/2 NS as above per Nephrology    - Recheck BMP in am    10/26: Continue IVF as above    - Recheck IVF    5.) Acute encephalopathy, etiology unclear, likely related to recent Covid 19 infection: 10/23 saw Worsening mentation today with fevers. Patient likely septic again due to unknown etiology. Infectious causes include rectal wound, pneumonia, PICC line infection. CT head on 10/20/2020 unremarkable.    10/24: Continue to track mentation   10/26: Mentation improving    6.) Acute on chronic Normocytic anemia due to acute rectal bleeding- secondary to gluteal/rectal ulcerations vs hemodilutional from IVF, (Improving): Hemoglobin of 8.6 today. Baseline Hgb 7-8 in 10/2020, was around 11 in 9/2020. S/p 2 units PRBC. Blood clot seen by nursing staff today likely secondary to rectal ulcer. Patient does have rectal bleed from rectal ulcer from Flexi-Seal.        10/23: Continue to monitor hemoglobin      - Transfuse for hemoglobin less than 7      - GI on-board. Sign off due to unlikely GI bleed and rectal bleeding likely secondary to ulceration. 10/24: Continue to Monitor bleeding. If rate worsens, consider checking Fibrinogen and PLTs for possible coagulopathy      - Transfuse if <7      - check H-H at 1400, CBC in am        10/26: Received 1 Unit PRBC      - Recheck CBC and H&H      - Monitor Bleeding from rectum       - GI states supportive care and good nutrition as treatment      - Transfuse if HgB <7     7.) Hypokalemia due to hypomagnesemia and diarrhea, (Resolved): Replace per protocol, BMP in am .     8.) Hypomagnesemia likely due to poor oral intake and diarrhea, (Resolving): Replace per protocol, check magnesium level in am.     9.) Dysphagia, At risk for Malnutrition: Patient failed Fiberoptic Endoscopic Evaluation of the Swallow (FEES). ST recommend NPO, per ST NGT not an option right now due to failed FEES. Started TPN temporarily on 10/21. ST plan to repeat FEES on Friday 10/23. However due to patient's fevers and decompensation unable to perform. 10/24: TPN in use, follow Dietary recommendations for nutrition and Free Water     10.) Diarrhea: C. diff test ordered by GI, but not done. May repeat if continue diarrhea.     11.) Sarahi-rectal lesion: Wound ostomy on-board, appreciate input. Cont zinc oxide as ordered.      12. ) Hx of KIARA: Noncompliant with CPAP at home, Pulmonology consulted, using BiPAP.           Settings: PEEP 6cm H2O              FiO2 30%              O2 flow rate 2 L/min     13.)  Diabetes mellitus type 2:  Blood sugars within goal range of 140-180. Continue Lantus at current dose and sliding scale insulin. Accu-Cheks q. 4 and at bedtime, Hypoglycemia protocol in place     14.)  Essential hypertension:  Uncontrolled today due to pt not receiving PO meds ( on amlodipine, cardizem and hydralazine PO at home). IV hydralazine prn ordered. Avoid hypotension with recent sepsis and renal failure.     15.) Physical Deconditioning:  Patient likely will need SNF versus LTAC at discharge. [facility] evaluation consult ordered. Palliative care following in discussed case with father who primary decision-maker. Code Status discussion should be made.     16.) Thrombocytosis, likely reactive due to recent COVID-19 (Resolved): , recheck with CBC in am     17.) Mild hypercalcemia, likely from dehydration (Resolved): Ca 9.0, Continue IVF per Nephrology. Recheck with daily BMP.     18.) Urinary retention: On mason cath, check UA and urine culture. 10/24:  No Results back on Urine Culture    Disposition Plan: TBD based on clinical course    History Of Present Illness:      Mr. Traci Juarez is a  44year-old morbidly obese black male lifetime non-smoker. Sonya Miller has a history of morbid obesity associated with type 2 diabetes mellitus, prior alcohol abuse, hyperlipidemia, hypertension, hypogonadism, nonalcoholic steatohepatitis, obstructive sleep apnea, and gout.  Patient was hospitalized 9/6/2020 through 9/15/2020 with hypoxemic respiratory failure secondary to COVID-19 associated with diffuse bilateral infiltrates.  At that time, patient received Decadron, remdesivir, and danazol.  During hospitalization, he had issues with atrial fibrillation.  His insulin therapy required adjustment.  He was discharged home on subcutaneous Lovenox. Patient returned back to the emergency room on 9/23/2020 with increasing SOB and progressive hypoxia. CXR showed diffuse infiltrates.  Deteriorated and required intubation.  Patient underwent bronchoscopy on 9/27/2020 which demonstrated no mucus production. Patient extubated 10/2/2020. Patient reintubated for progressive respiratory failure with progressive obtundation on 10/6/2020.  This was associated with acute oliguric renal failure.  Patient was intubated 10/6/2020, and underwent volume resuscitation.  Fractional excretion of sodium returned less than 1 which was consistent with prerenal azotemia.  After volume resuscitation, patient demonstrated that he was hemoconcentrated with a drop of 2 g in the hemoglobin. With volume resuscitation, urine output did improve. After patient was intubated on 10/6/2020, he underwent pulmonary lavage which showed MRSA on the PCR but no other organism.  However, patient was found to have greater than 200 white blood cells in the urine.  Patient was treated with Zosyn and doxycycline. Previously, patient had received vancomycin and developed fever while on vancomycin. Therefore vancomycin was not continued.  Sputum subsequently grew staph aureus.  Zosyn was discontinued but doxycycline continued on 10/14/2020. Patient did well with spontaneous breathing trial and was extubated 10/15/2020.     10/21: Weaned to room air. Respiratory status remained stable. Spiked fever this morning at 100.4 °F.  Possibly due to rectal wound due to Flexi-Seal.  Restarted on IV Zosyn, ID reconsulted. Patient would be a good candidate for SNF versus LTACH at discharge     10/22: Doing well on room air today. Afebrile. Continue Zosyn for 5 to 7 days. Faye evaluation. Continue zinc oxide for rectal wound. Blood pressure is elevated today, IV hydralazine for BP greater than 160     10/23: Recurrent fevers up to 103.0 °F today. Lactic acid ordered and normal.  Restarted vancomycin per ID. Possible PICC line infection versus worsening pneumonia. IV fluid bolus given. Patient tachypneic and tachycardic this morning, improved post fluid administration. inflation lungs. Borderline heart size. No effusion. 2. Right arm PICC line, catheter tip the cavoatrial junction. 3. Mild infiltrate scattered in both lungs and left infrahilar region. 4. Overall appearance slightly worsened from prior study. **This report has been created using voice recognition software. It may contain minor errors which are inherent in voice recognition technology. **      Final report electronically signed by Dr. Leena Bhagat on 10/23/2020 10:46 AM      CT ABDOMEN PELVIS WO CONTRAST Additional Contrast? None   Final Result   1. Almost complete resolution of previously seen airspace infiltrates in the lung bases. 2. No acute abdominal or pelvic abnormalities. **This report has been created using voice recognition software. It may contain minor errors which are inherent in voice recognition technology. **      Final report electronically signed by Dr. Shayy Hardin on 10/20/2020 10:50 AM      CT HEAD WO CONTRAST   Final Result    No evidence of acute intracranial abnormality. **This report has been created using voice recognition software. It may contain minor errors which are inherent in voice recognition technology. **      Final report electronically signed by Dr. Dhiraj Brown MD on 10/20/2020 10:13 AM      XR CHEST PORTABLE   Final Result   Ill-defined bilateral lung opacities. Follow-up as clinically indicated. This document has been electronically signed by: Ovidio Mas MD on 10/20/2020 05:38 AM         XR CHEST PORTABLE   Final Result   Minimal residual atelectasis and/or infiltrate within the bilateral mid    and lower lungs with improvement. Improved pulmonary inflation. This document has been electronically signed by: Abhay Vargas MD on    10/16/2020 02:02 AM         XR CHEST PORTABLE   Final Result   Impression:   Progressive bilateral consolidations or ARDS.       This document has been electronically signed by: Laina Eisenberg MD on    10/12/2020 04:59 AM         XR CHEST PORTABLE   Final Result    Similar bilateral ill-defined pneumonia. This document has been electronically signed by: Burke Bains. [address] [place] [address] on    10/11/2020 09:49 AM         XR CHEST PORTABLE   Final Result   Similar diffuse patchy bilateral airspace disease and consolidations. Support devices as above. This document has been electronically signed by: Carey Johnson MD on    10/10/2020 04:35 AM         XR CHEST PORTABLE   Final Result   No acute findings. This document has been electronically signed by: Annelise Price MD on 10/08/2020 07:40 AM         CT ABDOMEN PELVIS WO CONTRAST Additional Contrast? Oral   Final Result    IMPRESSION:   Bilateral lower lobe pneumonia. Known Covid. Continued progress imaging is advised   Distended colon with fluid, air and stool. Correlate for diarrhea. **This report has been created using voice recognition software. It may contain minor errors which are inherent in voice recognition technology. **      Final report electronically signed by Dr. Jayla Perez on 10/7/2020 6:49 PM      XR CHEST PORTABLE   Final Result   Bilateral lung consolidation not significant change. This document has been electronically signed by: Annelise Price MD on 10/07/2020 07:25 AM         US RENAL COMPLETE   Final Result   Unremarkable kidneys. This document has been electronically signed by: Jeanne Gallegos MD on    10/07/2020 01:48 AM         XR CHEST PORTABLE   Final Result   1. There is a new endotracheal tube with the distal tip 1.8 cm above the level of the madi. 2. There is a new esophageal tube with the distal tip projecting over the gastric fundus. 3. There is a stable right PICC with the distal tip projecting over the right atrium. 4. The lung volumes are diminished.  There are bilateral perihilar and the basilar opacities which are similar to the previous examination however may be accentuated by the expiratory technique. There is no pleural effusion. Follow-up chest radiographs    are recommended to confirm complete resolution. **This report has been created using voice recognition software. It may contain minor errors which are inherent in voice recognition technology. **      Final report electronically signed by Dr. Trixie Waller on 10/6/2020 7:18 AM      XR CHEST PORTABLE   Final Result   Bilateral lung opacities. Follow-up to clearing recommended. This document has been electronically signed by: Jin Durán MD on 10/06/2020 06:09 AM         XR CHEST PORTABLE   Final Result   Slightly decreased diffuse patchy bilateral airspace disease. Support devices as above. This document has been electronically signed by: Louis Centeno MD on    10/03/2020 05:15 AM         XR CHEST PORTABLE   Final Result   ET tube should be retracted 1.5-2.0 cm. No significant change in coarse scattered bilateral infiltrates. This document has been electronically signed by: Jaime Cantor MD on    09/30/2020 06:50 AM         XR CHEST PORTABLE   Final Result      Slightly improving bilateral pneumonia. **This report has been created using voice recognition software. It may contain minor errors which are inherent in voice recognition technology. **      Final report electronically signed by Dr. Berenice Irby on 9/27/2020 2:23 PM      XR ABDOMEN FOR NG/OG/NE TUBE PLACEMENT   Final Result   Esophageal route tube tip in the stomach. **This report has been created using voice recognition software. It may contain minor errors which are inherent in voice recognition technology. **      Final report electronically signed by Dr William Taylor on 9/26/2020 10:22 AM      XR CHEST PORTABLE   Final Result   1. Lines and tubes as above. 2. Worsening bilateral pneumonia.                **This report has been created using voice recognition software. It may contain minor errors which are inherent in voice recognition technology. **      Final report electronically signed by Dr. Ramandeep Reynoso on 9/24/2020 7:38 AM      XR CHEST PORTABLE   Final Result   1. Endotracheal tube terminates 3.1 cm above the madi. 2.  Orogastric tube in the stomach. 3.  Patchy opacities in the right upper lobe and lingula. This document has been electronically signed by: Terra Ramírez MD on    09/24/2020 12:35 AM         XR CHEST PORTABLE   Final Result   1. Bilateral pulmonary opacification worse than on previous study dated 10 September 2020. This may represent worsening inflammatory process, possibly Covid 19 infection. Please correlate clinically   2. Borderline cardiomegaly. **This report has been created using voice recognition software. It may contain minor errors which are inherent in voice recognition technology. **      Final report electronically signed by DR Brigette Bautista on 9/23/2020 10:47 AM           DVT prophylaxis: Lovenox, held for GI bleed    Code Status: Limited    PT/OT Eval Status: Not [facility] [address] Problems    Diagnosis Date Noted    Sepsis due to COVID-19 ([address] [U07.1, A41.89]     KRISTY (acute kidney injury) ([address] [N17.9]     Hypernatremia [E87.0]     Acute encephalopathy [G93.40]     Acute on chronic anemia [D64.9]     Hypomagnesemia [E83.42]     Dysphagia [R13.10]     Diarrhea [R19.7]     At risk for malnutrition [Z91.89]     Reactive thrombocytosis [R79.89]     Pneumonia due to COVID-19 virus [U07.1, J12.89] 10/18/2020    ARF (acute renal failure) with tubular necrosis (HCC) [N17.0]     Hypokalemia [E87.6]     Other hypotension [I95.89]     Acute respiratory failure with hypoxia ([address] [J96.01]     COVID-19 virus infection [U07.1] 09/06/2020    Essential hypertension [I10]        Thank you Vladimir Carmona MD for the opportunity to be involved in this patient's care.    Electronically signed by Kristi Swift DO on 10/26/2020 at 5:56 PM

## 2020-10-26 NOTE — PROGRESS NOTES
TPN Follow Up Note    Assessment: Remains NPO  Electrolyte Replacement: none    TPN changes for (today) at 1800:   Decrease Potassium Acetate 120 mEq  Add Potassium Chloride 60 mEq    Re-check BMP, Mg, PO4, iCa 10/28/2020    Glenroy Stiles PharmD 10/26/2020 12:46 PM

## 2020-10-26 NOTE — H&P
[facility]    Pre-Endoscopy H&PE      Patient: Alfred Neff    : 1970    Acct#: [de-identified]  Primary Care Physician: Chantell Belle MD   Date of evaluation: 10/26/2020    Planned Procedure:    []EGD    []Enteroscopy    []PEG    []PEG change    []PEG removal  []Variceal banding    []Biopsy   []Dilation      []Control of bleeding  []Destruction of lesion by [facility]    []Stent Placement  []Foreign Body Removal    []Snare Polypectomy  []Other:       []Colonoscopy  [x]Flex Sigmoid []EUS, rectal      []Biopsy   []Dilation      [x]Control of bleeding  []Destruction of lesion by [facility]    []Stent Placement  []Foreign Body Removal    []Snare Polypectomy  []Other:      Planned Sedation  []Conscious Sedation []MAC/Propofol []Anesthesia    [x]None    Airway:  Adequate for planned sedation    Indication:   hematochezia    History:  The patient is a 44 y.o.  male with recent admission 20-9/15/20 for hypoxemic respiratory failure secondary to COVID-19 admitted 20 for fatigue, worsening SOB, & progressive hypoxia for which he was intubated. Bronchoscopy 20. Extubated 10/02/20. 10/06/20 became more obtunded & progressive respiratory failure with acute oliguric renal failure, was re-intubated. Hypotensive requiring pressor support. He had diarrhea with a rectal tube for a few weeks which when it was deflated & removed, he was found to have oozing of bright red blood from the rectum, and GI was consulted 10/12/20. It was recommended to hold anticoagulants, keep the rectal tube out, and supportive care. Started on Zyvox. Extubated and placed on BiPAP. GI re-consulted 10/25/20 for hematochezia and progressive anemia. Unsedated flexible sigmoidoscopy is planned.     Physical Exam:  VITALS: /85   Pulse 83   Temp 98 °F (36.7 °C) (Axillary)   Resp 16   Ht 5' 8\" (1.727 m)   Wt 290 lb 1.6 oz (131.6 kg)   SpO2 100%   BMI 44.11 kg/m²   The patient is a

## 2020-10-26 NOTE — PROGRESS NOTES
[facility]         Renal Inpatient Follow-Up note         10/26/2020 12:37 PM    Pt Name:   Leah Celeste  YOB: 1970  Attending:   Jayne Nettles DO    Chief Complaint : Leah Celeste is a 44 y.o. male being followed by nephrology for acute kidney injury    Interval History :   Patient seen and examined by me. No distress. Drowsy but opening eyes to speech . Denies any chest pain or shortness of breath.  On a BIPAP     Scheduled Medications :    iron sucrose  500 mg Intravenous Once    pantoprazole  40 mg Intravenous BID    vancomycin  1,750 mg Intravenous Q24H    vancomycin (VANCOCIN) intermittent dosing (placeholder)   Other RX Placeholder    sodium chloride flush  10 mL Intravenous 2 times per day    ferrous sulfate  325 mg Oral BID WC    insulin lispro  0-12 Units Subcutaneous Q6H    piperacillin-tazobactam  3.375 g Intravenous Q8H    [Held by provider] gabapentin  400 mg Oral BID    [Held by provider] modafinil  100 mg Oral Daily    [Held by provider] enoxaparin  40 mg Subcutaneous BID    insulin glargine  38 Units Subcutaneous Nightly    lidocaine 1 % injection  5 mL Intradermal Once    magnesium replacement protocol   Other RX Placeholder    phosphorus replacement protocol   Other RX Placeholder    calcium replacement protocol   Other RX Placeholder    [Held by provider] ARIPiprazole  15 mg Oral Daily    [Held by provider] aspirin  81 mg Oral Daily    glycopyrrolate-formoterol  2 puff Inhalation BID      sodium chloride      PN-Adult  3 IN 1 Central Line (Custom) 100 mL/hr at 10/25/20 1719    dextrose         Vitals :  BP (!) 145/87   Pulse 95   Temp 97.8 °F (36.6 °C) (Axillary)   Resp 15   Ht 5' 8\" (1.727 m)   Wt 290 lb 1.6 oz (131.6 kg)   SpO2 97%   BMI 44.11 kg/m²     24HR INTAKE/OUTPUT:      Intake/Output Summary (Last 24 hours) at 10/26/2020 1237  Last data filed at 10/26/2020 1200  Gross per 24 hour   Intake 3439.05 ml   Output 2350 ml   Net 1089.05 ml     Last 3 weights  Wt Readings from Last 3 Encounters:   10/26/20 290 lb 1.6 oz (131.6 kg)   09/15/20 281 lb 6.4 oz (127.6 kg)   08/21/20 (!) 306 lb 6.4 oz (139 kg)           Physical Exam :  General Appearance: Obese well-nourished no distress  CNS- opens eyes to speech  Psych-not agitated  Lungs diminished  Abdomen: Appears slightly distended  Musculoskeletal:  Edema -no edema           Last 3 CBC   Recent Labs     10/25/20  0310 10/25/20  0840 10/26/20  0620 10/26/20  0655   WBC 11.1* 10.2 9.4  --    RBC 2.26* 2.58* 2.37*  --    HGB 6.4* 7.3* 6.6* 6.6*   HCT 21.7* 24.5* 22.1* 22.0*    321 296  --      Last 3 CMP  Recent Labs     10/24/20  0320 10/25/20  0310 10/25/20  0840 10/26/20  0226   * 141 143 141   K 4.2 4.6 4.4 4.5   * 104 107 104   CO2 24 27 28 30   BUN 17 15 14 12   CREATININE 1.5* 1.5* 1.4* 1.5*   CALCIUM 9.0 9.3 9.5 9.7   LABALBU 2.6* 2.8* 2.9*  --    BILITOT 0.5 0.4 0.5  --              ASSESSMENT / Plan   1 Renal -acute kidney injury most likely due to septic ATN/hypotension  ? Improving with some hydration with TPN  Currently at 1. 5  ? May be developing a new baseline  ? As needed diuretics    2 Electrolytes - doing well. Supplemented by TPN  3 Anemia- S/P post rectal clot. GI on board for an endoscopy today  4 Hypotension resolved . 5 Hx of diabetes mellitus  6 Hypercalcemia-improving follow for now no need for any bisphosphonate this is hypercalcemia of immobility. improved   7 Pneumonia on abx - follow vanc levels closley ID on board  8 Hx of COVID-19 pneumonia   9 Hx of diastolic dysfunction volume status reasonable closely follow. As needed diuretics  10 Meds reviewed      EMELY Chilel D.  [facility].

## 2020-10-26 NOTE — OP NOTE
[address] [address] Endoscopy    Patient: Rabia Severe  : 1970  Acct#: [de-identified]  Date of evaluation: 10/26/2020  Primary Care Physician: Kalyani Irby MD     Procedure:    []EGD    []Enteroscopy    []Biopsy   []Dilation      []PEG    []PEG change    []PEG removal  []Variceal banding    []Control of bleeding  []Destruction of lesion by [facility]    []Stent Placement  []Foreign Body Removal    []Snare Polypectomy  []Other:     []Aborted:        []Colonoscopy  [x]Flex Sigmoid []EUS, rectal     []Biopsy   []Snare Polypectomy    []Control of bleeding  []Destruction of lesion by [facility]    []Stent Placement  []Foreign Body Removal    []Dilation    []Other:    []Aborted:   []Tattoo    Indication:   hematochezia    History:  The patient is a 44 y.o.  male with recent admission 20-9/15/20 for hypoxemic respiratory failure secondary to COVID-19 admitted 20 for fatigue, worsening SOB, & progressive hypoxia for which he was intubated. Bronchoscopy 20. Extubated 10/02/20. 10/06/20 became more obtunded & progressive respiratory failure with acute oliguric renal failure, was re-intubated. Hypotensive requiring pressor support. He had diarrhea with a rectal tube for a few weeks which when it was deflated & removed, he was found to have oozing of bright red blood from the rectum, and GI was consulted 10/12/20. It was recommended to hold anticoagulants, keep the rectal tube out, and supportive care. Started on Zyvox. Extubated and placed on BiPAP. GI re-consulted 10/25/20 for hematochezia and progressive anemia. Unsedated flexible sigmoidoscopy is planned. Physical Exam:  VITALS: /85   Pulse 83   Temp 98 °F (36.7 °C) (Axillary)   Resp 16   Ht 5' 8\" (1.727 m)   Wt 290 lb 1.6 oz (131.6 kg)   SpO2 100%   BMI 44.11 kg/m²   The patient is a 44 y.o.  male on Bipap continuously. HEAD: Normal cephalic/atraumatic. NECK: No lymphadenopathy or bruits. CHEST: Rises equally on inspiration. Clear to auscultation bilaterally. CARDIOVASCULAR: Regular rate and rhythm without murmurs, rubs or gallops. ABDOMEN: Soft, nontender, and nondistended with normal bowel sounds. No Hepatosplemomegaly. UPPER EXTREMITIES: no cyanosis, clubbing, or edema. DERM: no rash or jaundice. LOWER EXTREMITIES: 1+edema. NEURO: Alert but difficult to assess orientation. Patient moves all extremities    ASA: ASA 3 - Patient with moderate systemic disease with functional limitations    MEDICATION:    None    Photo:  Yes  Biopsy:  No      Description of Procedure: The risks and benefits of the procedure were described to the patient or their representative if unable to give consent including but not limited to bleeding, infection, poking a hole someplace requiring surgery to fix it, having a reaction to medication, and death. The patient or their representative if unable to give consent understood these risks and provided informed consent. Airway was assessed and is adequate for the planned sedation. Anesthesia: Moderate Sedation  Estimated Blood Loss: less than 50   Complications: None  Specimens: Was Not Obtained     No conscious sedation was administered. The patient was placed in the left lateral decubitus position. The perianal area was inspected, and a digital rectal exam was performed. A forward-viewing Olympus pediatric colonoscope was lubricated and inserted through the anus into the rectum. Under direct visualization, this was advanced to the rectum    The scope was then slowly withdrawn with good views of mucosal surfaces. The scope was retroflexed in the rectum. Findings and maneuvers are listed in impression below. The patient tolerated the procedure well. The scope was removed. The patient was moved to the recovery area. There were no immediate complications. IMPRESSION:  1.   Large perianal ulcer extending into anal canal  2. No rectal ulcer  3. No intervention performed     RECOMMENDATIONS:    1. Supportive care including good nutrition. 2.  Follow Hgb and transfuse prn.     Abel Yousif MD  2:49 PM 10/26/2020

## 2020-10-26 NOTE — PROGRESS NOTES
Progress note: Infectious diseases    Patient - Kenneth Bloom,  Age - 44 y.o.    - 1970      Room Number - 4K-10/010-A   MRN -  [id number]   Acct # - [de-identified]  Date of Admission -  2020  9:02 AM    SUBJECTIVE:   No new issues  Discussed with nursing staff  He is very lethargic on BIPAP  OBJECTIVE   VITALS    height is 5' 8\" (1.727 m) and weight is 290 lb 1.6 oz (131.6 kg). His axillary temperature is 97.8 °F (36.6 °C). His blood pressure is 162/87 (abnormal) and his pulse is 83. His respiration is 17 and oxygen saturation is 99%. Wt Readings from Last 3 Encounters:   10/26/20 290 lb 1.6 oz (131.6 kg)   09/15/20 281 lb 6.4 oz (127.6 kg)   20 (!) 306 lb 6.4 oz (139 kg)       I/O (24 Hours)    Intake/Output Summary (Last 24 hours) at 10/26/2020 1045  Last data filed at 10/26/2020 5568  Gross per 24 hour   Intake 3209.88 ml   Output 2350 ml   Net 859.88 ml       General Appearance :on BIPAP, lethargic  HEENT - normocephalic, atraumatic, pale  conjunctiva,  anicteric sclera dry oral cavity  Neck - Supple, no mass  Lungs -  Bilateral   air entry, diminished breath sound  Cardiovascular - Heart sounds are normal.    Abdomen - soft, not distended, nontender,   Neurologic -Lethargic, on BIPAP  Skin - No bruising or bleeding  Extremities - chronic leg edema, perianal open wound. Dimas in place.     MEDICATIONS:      sodium chloride  20 mL Intravenous Once    iron sucrose  500 mg Intravenous Once    pantoprazole  40 mg Intravenous BID    vancomycin  1,750 mg Intravenous Q24H    vancomycin (VANCOCIN) intermittent dosing (placeholder)   Other RX Placeholder    sodium chloride flush  10 mL Intravenous 2 times per day    ferrous sulfate  325 mg Oral BID WC    insulin lispro  0-12 Units Subcutaneous Q6H    piperacillin-tazobactam  3.375 g Intravenous Q8H    [Held by provider] gabapentin  400 mg Oral BID    [Held by provider] modafinil  100 mg Oral Daily    [Held by provider] enoxaparin  40 mg Subcutaneous BID    insulin glargine  38 Units Subcutaneous Nightly    lidocaine 1 % injection  5 mL Intradermal Once    magnesium replacement protocol   Other RX Placeholder    phosphorus replacement protocol   Other RX Placeholder    calcium replacement protocol   Other RX Placeholder    [Held by provider] ARIPiprazole  15 mg Oral Daily    [Held by provider] aspirin  81 mg Oral Daily    glycopyrrolate-formoterol  2 puff Inhalation BID      sodium chloride      PN-Adult  3 IN 1 Central Line (Custom) 100 mL/hr at 10/25/20 1719    dextrose       sodium chloride flush, hydrALAZINE, acetaminophen, potassium chloride **OR** potassium alternative oral replacement **OR** potassium chloride, potassium chloride, lidocaine, acetaminophen **OR** [DISCONTINUED] acetaminophen, polyethylene glycol, promethazine **OR** ondansetron, albuterol, glucose, dextrose, glucagon (rDNA), dextrose      LABS:     CBC:   Recent Labs     10/25/20  0310 10/25/20  0840 10/26/20  0620 10/26/20  0655   WBC 11.1* 10.2 9.4  --    HGB 6.4* 7.3* 6.6* 6.6*    321 296  --      BMP:    Recent Labs     10/25/20  0310 10/25/20  0840 10/26/20  0226    143 141   K 4.6 4.4 4.5    107 104   CO2 27 28 30   BUN 15 14 12   CREATININE 1.5* 1.4* 1.5*   GLUCOSE 159* 135* 137*     Calcium:  Recent Labs     10/26/20  0226   CALCIUM 9.7     Ionized Calcium:No results for input(s): IONCA in the last 72 hours. Magnesium:  Recent Labs     10/26/20  0226   MG 1.8     Phosphorus:  Recent Labs     10/26/20  0226   PHOS 3.4     BNP:No results for input(s): BNP in the last 72 hours. Glucose:  Recent Labs     10/25/20  2311 10/26/20  0131 10/26/20  0528   POCGLU 137* 148* 133*     HgbA1C: No results for input(s): LABA1C in the last 72 hours.   INR:   Recent Labs     10/25/20  0310   INR 1.24*     Hepatic:   Recent Labs     10/24/20  0320 10/25/20  7631 10/25/20  0840   ALKPHOS 76 70 75   ALT 14 12 12   AST 16 13 14   PROT 5.9* 6.0* 6.0*   BILITOT 0.5 0.4 0.5   LABALBU 2.6* 2.8* 2.9*        CULTURES:   UA: No results for input(s): SPECGRAV, PHUR, COLORU, CLARITYU, MUCUS, PROTEINU, BLOODU, RBCUA, WBCUA, BACTERIA, NITRU, GLUCOSEU, BILIRUBINUR, UROBILINOGEN, KETUA, LABCAST, LABCASTTY, AMORPHOS in the last 72 hours. Invalid input(s): CRYSTALS  Micro:   Lab Results   Component Value Date    BC No growth-preliminary  10/22/2020    BC  10/22/2020     possible contamination; clinical correlation required           Problem list of patient:     Patient Active Problem List   Diagnosis Code    Chronic diastolic congestive heart failure (HCC) I50.32    Atrial fibrillation (HCC) I48.91    BPH (benign prostatic hyperplasia) N40.0    Carpal tunnel syndrome on right G56.01    Chronic gout M1A. 9XX0    DM[facility] mellitus, type 2) [facility] E11.9    Heart failure with preserved ejection fraction (HCC) I50.30    History of alcohol abuse F10.11    History of osteomyelitis Z87.39    Essential hypertension I10    Hypogonadism, male E29.1    Major depression F32.9    Morbid obesity ([facility]) E66.01    DURAN (nonalcoholic steatohepatitis) K75.81    KIARA (obstructive sleep apnea) G47.33    Vitamin D deficiency E55.9    Normocytic anemia D64.9    Physical deconditioning R53.81    Mood disorder (HCC) F39    Hallucinations R44.3    GERD (gastroesophageal reflux disease) K21.9    Wound of buttock S31.809A    Chest wall pain with tenderness R07.89    Primary osteoarthritis of left hip M16.12    COVID-19 U07.1    COVID-19 virus infection U07.1    Acute respiratory failure with hypoxia (HCC) J96.01    ARF (acute renal failure) with tubular necrosis ([facility]) N17.0    Hypokalemia E87.6    Other hypotension I95.89    Pneumonia due to COVID-19 virus U07.1, J12.89    Sepsis due to COVID-19 ([facility]) U07.1, A41.89    RKISTY (acute kidney injury) ([facility] N17.9    Hypernatremia E87.0    Acute encephalopathy G93.40    Acute on chronic anemia D64.9    Hypomagnesemia E83.42    Dysphagia R13.10    Diarrhea R19.7    At risk for malnutrition Z91.89    Reactive thrombocytosis R79.89         ASSESSMENT/PLAN   Respiratory failure following COVID -19 infection: on BIPAP. Anemia: for upper endoscopy. Deconditioning  Ulceration of the rectum with intermittent bleed due to flexiseal    Obesity  Continue current treatment.      Margo Wise MD, FACP 10/26/2020 10:45 AM

## 2020-10-26 NOTE — PROGRESS NOTES
Comprehensive Nutrition Assessment    Type and Reason for Visit:  Reassess(TPN macronutrients)    Nutrition Recommendations/Plan:   Next bag of 3:1 TPN dosed on 88 kg & increase to 15 kcals/kg, 1.4 grams protein/kg & 30% lipid kcals. Pt currently NPO. GI on case. Diet per Dr & SLP. Nutrition Assessment:      Pt. Compromised  from a nutritional standpoint AEB NPO status, PICC, TPN & GI status. Remains at risk for further nutritional compromise r/t continued NPO status due to dysphagia, previous unsuccessful NGT placement attempts due to anatomical and behavioral issues, catabolic illness, increased nutrient needs to support wound healing, lengthy hospitalization due to positive covid-19, acute respiratory failure, intubated-extubated 10/15/20,need for Bipap,  sepsis, pneumonia, KRISTY, anemia, GI bleed (lf clot/ulceration at site of rectum), deconditioning, and underlying medical condition (history of obesity, DM, GERD, alcohol abuse, HLD, HTN, vitamin D deficiency), and need for nutrition support. Nutrition recommendations/interventions as per above  Malnutrition Assessment:  Malnutrition Status: At risk for malnutrition (Comment)(no nutrition in ~5 days)    Context:  Acute Illness     Findings of the 6 clinical characteristics of malnutrition:  Energy Intake:  7 - 50% or less of estimated energy requirements for 5 or more days  Weight Loss:  Unable to assess(weight fluctuations with edema)     Body Fat Loss:  No significant body fat loss     Muscle Mass Loss:  No significant muscle mass loss    Fluid Accumulation:  (+1 and +2 edema) Extremities   Strength:  Not Performed    Estimated Daily Nutrient Needs:  Energy (kcal):  5189-5398 (30-32 kcals/kg IBW in active late phase);  Weight Used for Energy Requirements:  Ideal(70 kg - ideal weight)     Protein (g):  ~140 gms (2/kgm IBW) as renal status allows; Weight Used for Protein Requirements:  Ideal(70 kg)        Fluid (ml/day):  per MD; Weight Used for Fluid Requirements:  (n/a)      Nutrition Related Findings:  recent Covid pt , now testing negative. Pt has moved form 4D to 4K this morning. Pt is NPO. GI on case. TPN via PICC now per RN. Bipap. Pt following commands . + stool recorded yesterday. BUN 12, Cr 1.5, glucose 137, Hgb 6.6.   SLP following. meds include vancomycin, iron, venofer, humalog & lantus      Wounds:  (perineum unstageable-pressure, scotum skin tear)       Current Nutrition Therapies:    Current Parenteral Nutrition Orders:  · Type and Formula: 3-in-1 Custom(PICC line)   · Lipids: Daily  · Duration: Continuous  · Rate/Volume: 100 ml/hr  · Current PN Order Provides: 88 kg to dose, 10 kcals/kg/day, 30% lipid kcals, 1 gram protein/kg/day~ 880 kcals, 88 grams protein, 78 grams CHO, 26 grams lipid per 24 hours  ·  next bag of 3:1 TPN dosed on 88 kg & increase to 15 kcals/kg, 1.4 grams protein/kg & 30% lipid kcals to provide 1320 kcals, 123 grams protein, 127 grams CHO & 40 grams fat/24 hours      Anthropometric Measures:  · Height: 5' 8\" (172.7 cm)  · Current Body Weight: 290 lb 14.4 oz (132 kg)(10/23/20 +1 generalized, +2 UE and LE edema)   · Admission Body Weight: 285 lb (129.3 kg)(9/23/20, stated, +1 BLE and BUE edema)    · Usual Body Weight: 306 lb (138.8 kg)(EMR, 6/29/20, actual.  299# 8/15/20, [facility].)     · Ideal Body Weight: 154 lbs;  · BMI: 47.1  · Adjusted Body Weight:  ; (193# (88 kg) adjusted for TPN)   · BMI Categories: Obese Class 3 (BMI 40.0 or greater)       Nutrition Diagnosis:   · Inadequate oral intake related to cognitive or neurological impairment, swallowing difficulty as evidenced by NPO or clear liquid status due to medical condition, nutrition support - parenteral nutrition      Nutrition Interventions:   Food and/or Nutrient Delivery:  Continue NPO, Modify Parenteral Nutrition(Diet per Dr & SLP as pt tolerates)  Nutrition Education/Counseling:  No recommendation at this time   Coordination of Nutrition Care:  Continued Inpatient Monitoring    Goals:  Patient will receive PN to meet 75% or more of estimated nutrient needs until able to initiate EN or oral diet. Nutrition Monitoring and Evaluation:   Behavioral-Environmental Outcomes:  (n/a)   Food/Nutrient Intake Outcomes:  Diet Advancement/Tolerance, Parenteral Nutrition Intake/Tolerance  Physical Signs/Symptoms Outcomes:  Biochemical Data, Chewing or Swallowing, GI Status, Fluid Status or Edema, Nutrition Focused Physical Findings, Skin, Weight     Discharge Planning:     Too soon to determine     Electronically signed by Hugo Andrews RD, LD on 10/26/20 at 10:19 AM EDT    Contact: [phone]

## 2020-10-26 NOTE — PROGRESS NOTES
Intravenous 2 times per day    ferrous sulfate  325 mg Oral BID     insulin lispro  0-12 Units Subcutaneous Q6H    piperacillin-tazobactam  3.375 g Intravenous Q8H    [Held by provider] gabapentin  400 mg Oral BID    [Held by provider] modafinil  100 mg Oral Daily    [Held by provider] enoxaparin  40 mg Subcutaneous BID    insulin glargine  38 Units Subcutaneous Nightly    lidocaine 1 % injection  5 mL Intradermal Once    magnesium replacement protocol   Other RX Placeholder    phosphorus replacement protocol   Other RX Placeholder    calcium replacement protocol   Other RX Placeholder    [Held by provider] ARIPiprazole  15 mg Oral Daily    [Held by provider] aspirin  81 mg Oral Daily    glycopyrrolate-formoterol  2 puff Inhalation BID       Vital Signs:   T: 98.0 (Tmax 99.3 last 24h): P: 83 RR: 16 B/P: 135/85: FiO2: 28% (2L NC): O2 Sat:100%: I/O: 3209/2350 (+859) GCS: 15  Body mass index is 44.11 kg/m². Pepito Ahumada General:   Acute on chronically ill adult male. Appears stated age. Morbidly obese. HEENT:  normocephalic and atraumatic. No scleral icterus. PERR  Neck: supple. No Thyromegaly. Lungs: clear to auscultation. Normal rate, pattern. Unlabored. No accessory use. No retractions  Cardiac: RRR. S1/S2. No murmur. No JVD. Abdomen: soft. Obese. Nondistended. Nontender. Extremities:  No clubbing, cyanosis, or edema x 4. Vasculature: capillary refill < 3 seconds. Palpable 1+ dorsalis pedis pulses. Skin:  warm and dry. Psych:  Alert and oriented x3. Affect appropriate  Lymph:  No supraclavicular adenopathy. Neurologic:  No focal deficit. No seizures. Follows commands. Appropriate. Spontaneous equal movement x4 extremities. Data: (All radiographs, tracings, PFTs, and imaging are personally viewed and interpreted unless otherwise noted).  Telemetry: Normal sinus rhythm. Heart rate 83. No ectopy.  12-lead ECG (10/6) report: Normal sinus rhythm.  Inverted T waves in Inferior

## 2020-10-26 NOTE — PROGRESS NOTES
Pt transferred to 4k10 per bed in stable condition - report previously called per Codie deras . Pts father informed of transfer to 4k 10

## 2020-10-26 NOTE — PLAN OF CARE
Problem: Nutrition  Goal: Optimal nutrition therapy  Outcome: Ongoing   Nutrition Problem #1: Inadequate oral intake  Intervention: Food and/or Nutrient Delivery: Continue NPO, Modify Parenteral Nutrition(Diet per Dr & SLP as pt tolerates)  Nutritional Goals: Patient will receive PN to meet 75% or more of estimated nutrient needs until able to initiate EN or oral diet.

## 2020-10-27 LAB
ALBUMIN SERPL-MCNC: 2.9 G/DL (ref 3.5–5.1)
ALP BLD-CCNC: 73 U/L (ref 38–126)
ALT SERPL-CCNC: 12 U/L (ref 11–66)
ANION GAP SERPL CALCULATED.3IONS-SCNC: 9 MEQ/L (ref 8–16)
AST SERPL-CCNC: 14 U/L (ref 5–40)
BASOPHILS # BLD: 0.6 %
BASOPHILS ABSOLUTE: 0.1 THOU/MM3 (ref 0–0.1)
BILIRUB SERPL-MCNC: 0.4 MG/DL (ref 0.3–1.2)
BUN BLDV-MCNC: 14 MG/DL (ref 7–22)
CALCIUM IONIZED: 1.26 MMOL/L (ref 1.12–1.32)
CALCIUM SERPL-MCNC: 9.5 MG/DL (ref 8.5–10.5)
CHLORIDE BLD-SCNC: 98 MEQ/L (ref 98–111)
CO2: 31 MEQ/L (ref 23–33)
CREAT SERPL-MCNC: 1.5 MG/DL (ref 0.4–1.2)
EOSINOPHIL # BLD: 5.3 %
EOSINOPHILS ABSOLUTE: 0.6 THOU/MM3 (ref 0–0.4)
ERYTHROCYTE [DISTWIDTH] IN BLOOD BY AUTOMATED COUNT: 15.9 % (ref 11.5–14.5)
ERYTHROCYTE [DISTWIDTH] IN BLOOD BY AUTOMATED COUNT: 52.1 FL (ref 35–45)
GFR SERPL CREATININE-BSD FRML MDRD: 59 ML/MIN/1.73M2
GLUCOSE BLD-MCNC: 155 MG/DL (ref 70–108)
GLUCOSE BLD-MCNC: 157 MG/DL (ref 70–108)
GLUCOSE BLD-MCNC: 162 MG/DL (ref 70–108)
GLUCOSE BLD-MCNC: 180 MG/DL (ref 70–108)
GLUCOSE BLD-MCNC: 181 MG/DL (ref 70–108)
GLUCOSE BLD-MCNC: 185 MG/DL (ref 70–108)
GLUCOSE BLD-MCNC: 186 MG/DL (ref 70–108)
HCT VFR BLD CALC: 19.9 % (ref 42–52)
HCT VFR BLD CALC: 21.6 % (ref 42–52)
HCT VFR BLD CALC: 23.1 % (ref 42–52)
HEMOGLOBIN: 6.1 GM/DL (ref 14–18)
HEMOGLOBIN: 6.8 GM/DL (ref 14–18)
HEMOGLOBIN: 7.2 GM/DL (ref 14–18)
IMMATURE GRANS (ABS): 0.14 THOU/MM3 (ref 0–0.07)
IMMATURE GRANULOCYTES: 1.3 %
LYMPHOCYTES # BLD: 12.6 %
LYMPHOCYTES ABSOLUTE: 1.4 THOU/MM3 (ref 1–4.8)
MAGNESIUM: 1.8 MG/DL (ref 1.6–2.4)
MCH RBC QN AUTO: 28.7 PG (ref 26–33)
MCHC RBC AUTO-ENTMCNC: 31.2 GM/DL (ref 32.2–35.5)
MCV RBC AUTO: 92 FL (ref 80–94)
MONOCYTES # BLD: 10.4 %
MONOCYTES ABSOLUTE: 1.1 THOU/MM3 (ref 0.4–1.3)
NUCLEATED RED BLOOD CELLS: 0 /100 WBC
PHOSPHORUS: 3.6 MG/DL (ref 2.4–4.7)
PLATELET # BLD: 357 THOU/MM3 (ref 130–400)
PMV BLD AUTO: 10.8 FL (ref 9.4–12.4)
POTASSIUM SERPL-SCNC: 4.8 MEQ/L (ref 3.5–5.2)
RBC # BLD: 2.51 MILL/MM3 (ref 4.7–6.1)
SEG NEUTROPHILS: 69.8 %
SEGMENTED NEUTROPHILS ABSOLUTE COUNT: 7.6 THOU/MM3 (ref 1.8–7.7)
SODIUM BLD-SCNC: 138 MEQ/L (ref 135–145)
TOTAL PROTEIN: 6.1 G/DL (ref 6.1–8)
WBC # BLD: 10.9 THOU/MM3 (ref 4.8–10.8)

## 2020-10-27 PROCEDURE — 2580000003 HC RX 258: Performed by: STUDENT IN AN ORGANIZED HEALTH CARE EDUCATION/TRAINING PROGRAM

## 2020-10-27 PROCEDURE — 94761 N-INVAS EAR/PLS OXIMETRY MLT: CPT

## 2020-10-27 PROCEDURE — 99232 SBSQ HOSP IP/OBS MODERATE 35: CPT | Performed by: INTERNAL MEDICINE

## 2020-10-27 PROCEDURE — 6360000002 HC RX W HCPCS: Performed by: STUDENT IN AN ORGANIZED HEALTH CARE EDUCATION/TRAINING PROGRAM

## 2020-10-27 PROCEDURE — 2700000000 HC OXYGEN THERAPY PER DAY

## 2020-10-27 PROCEDURE — 84100 ASSAY OF PHOSPHORUS: CPT

## 2020-10-27 PROCEDURE — C9113 INJ PANTOPRAZOLE SODIUM, VIA: HCPCS | Performed by: FAMILY MEDICINE

## 2020-10-27 PROCEDURE — 83735 ASSAY OF MAGNESIUM: CPT

## 2020-10-27 PROCEDURE — 85014 HEMATOCRIT: CPT

## 2020-10-27 PROCEDURE — 82948 REAGENT STRIP/BLOOD GLUCOSE: CPT

## 2020-10-27 PROCEDURE — 2060000000 HC ICU INTERMEDIATE R&B

## 2020-10-27 PROCEDURE — 36415 COLL VENOUS BLD VENIPUNCTURE: CPT

## 2020-10-27 PROCEDURE — 36592 COLLECT BLOOD FROM PICC: CPT

## 2020-10-27 PROCEDURE — 94660 CPAP INITIATION&MGMT: CPT

## 2020-10-27 PROCEDURE — 94640 AIRWAY INHALATION TREATMENT: CPT

## 2020-10-27 PROCEDURE — 82668 ASSAY OF ERYTHROPOIETIN: CPT

## 2020-10-27 PROCEDURE — 2580000003 HC RX 258: Performed by: FAMILY MEDICINE

## 2020-10-27 PROCEDURE — 2500000003 HC RX 250 WO HCPCS: Performed by: PHARMACIST

## 2020-10-27 PROCEDURE — 80053 COMPREHEN METABOLIC PANEL: CPT

## 2020-10-27 PROCEDURE — 82330 ASSAY OF CALCIUM: CPT

## 2020-10-27 PROCEDURE — 85025 COMPLETE CBC W/AUTO DIFF WBC: CPT

## 2020-10-27 PROCEDURE — 85018 HEMOGLOBIN: CPT

## 2020-10-27 PROCEDURE — 6370000000 HC RX 637 (ALT 250 FOR IP): Performed by: INTERNAL MEDICINE

## 2020-10-27 PROCEDURE — 6360000002 HC RX W HCPCS: Performed by: FAMILY MEDICINE

## 2020-10-27 RX ORDER — 0.9 % SODIUM CHLORIDE 0.9 %
20 INTRAVENOUS SOLUTION INTRAVENOUS ONCE
Status: COMPLETED | OUTPATIENT
Start: 2020-10-27 | End: 2020-10-27

## 2020-10-27 RX ADMIN — INSULIN GLARGINE 38 UNITS: 100 INJECTION, SOLUTION SUBCUTANEOUS at 21:59

## 2020-10-27 RX ADMIN — PIPERACILLIN AND TAZOBACTAM 3.38 G: 3; .375 INJECTION, POWDER, LYOPHILIZED, FOR SOLUTION INTRAVENOUS at 20:33

## 2020-10-27 RX ADMIN — SODIUM CHLORIDE, PRESERVATIVE FREE 10 ML: 5 INJECTION INTRAVENOUS at 20:36

## 2020-10-27 RX ADMIN — VANCOMYCIN HYDROCHLORIDE 1750 MG: 5 INJECTION, POWDER, LYOPHILIZED, FOR SOLUTION INTRAVENOUS at 12:06

## 2020-10-27 RX ADMIN — PANTOPRAZOLE SODIUM 40 MG: 40 INJECTION, POWDER, FOR SOLUTION INTRAVENOUS at 20:33

## 2020-10-27 RX ADMIN — GLYCOPYRROLATE AND FORMOTEROL FUMARATE 2 PUFF: 9; 4.8 AEROSOL, METERED RESPIRATORY (INHALATION) at 20:45

## 2020-10-27 RX ADMIN — SODIUM CHLORIDE 20 ML: 9 INJECTION, SOLUTION INTRAVENOUS at 20:06

## 2020-10-27 RX ADMIN — PIPERACILLIN AND TAZOBACTAM 3.38 G: 3; .375 INJECTION, POWDER, LYOPHILIZED, FOR SOLUTION INTRAVENOUS at 03:19

## 2020-10-27 RX ADMIN — PANTOPRAZOLE SODIUM 40 MG: 40 INJECTION, POWDER, FOR SOLUTION INTRAVENOUS at 08:27

## 2020-10-27 RX ADMIN — SODIUM CHLORIDE, PRESERVATIVE FREE 10 ML: 5 INJECTION INTRAVENOUS at 08:27

## 2020-10-27 RX ADMIN — SODIUM CHLORIDE 20 ML: 9 INJECTION, SOLUTION INTRAVENOUS at 14:29

## 2020-10-27 RX ADMIN — INSULIN LISPRO 2 UNITS: 100 INJECTION, SOLUTION INTRAVENOUS; SUBCUTANEOUS at 12:06

## 2020-10-27 RX ADMIN — INSULIN LISPRO 2 UNITS: 100 INJECTION, SOLUTION INTRAVENOUS; SUBCUTANEOUS at 17:12

## 2020-10-27 RX ADMIN — INSULIN LISPRO 2 UNITS: 100 INJECTION, SOLUTION INTRAVENOUS; SUBCUTANEOUS at 06:38

## 2020-10-27 RX ADMIN — GLYCOPYRROLATE AND FORMOTEROL FUMARATE 2 PUFF: 9; 4.8 AEROSOL, METERED RESPIRATORY (INHALATION) at 09:52

## 2020-10-27 RX ADMIN — SODIUM CHLORIDE: 234 INJECTION INTRAMUSCULAR; INTRAVENOUS; SUBCUTANEOUS at 17:06

## 2020-10-27 RX ADMIN — PIPERACILLIN AND TAZOBACTAM 3.38 G: 3; .375 INJECTION, POWDER, LYOPHILIZED, FOR SOLUTION INTRAVENOUS at 10:51

## 2020-10-27 ASSESSMENT — PAIN SCALES - WONG BAKER: WONGBAKER_NUMERICALRESPONSE: 0

## 2020-10-27 ASSESSMENT — PAIN SCALES - GENERAL
PAINLEVEL_OUTOF10: 0

## 2020-10-27 NOTE — PLAN OF CARE
Problem: Nutrition  Goal: Optimal nutrition therapy  Outcome: Ongoing  Note: Patient is NPO. Problem: Impaired respiratory status  Goal: Patient will achieve/maintain normal respiratory rate/effort  10/26/2020 2006 by Libertad Mccord RCP  Outcome: Ongoing  Goal: Clear lung sounds  10/26/2020 2006 by Libertad Mccord RCP  Outcome: Ongoing     Problem: Serum Glucose Level - Abnormal:  Goal: Ability to maintain appropriate glucose levels will improve  Description: Ability to maintain appropriate glucose levels will improve  Outcome: Ongoing  Note: Checking the patient's blood sugar every 6 hours. Problem: Musculor/Skeletal Functional Status  Goal: Absence of falls  Outcome: Ongoing  Note: Patient has remained free from falls tonight. Call light and bedside table are within reach. Patient had remained in bed this shift. Fall precautions in place for hi safety. Nurse checks on the patient every hour. Problem: Pain Control  Goal: Maintain pain level at or below patient's acceptable level (or 5 if patient is unable to determine acceptable level)  Outcome: Ongoing  Note: Pain Assessment: Faces  Pain Level: 0   Patient's Stated Pain Goal: No pain   Is pain goal met at this time? Yes     Non-Pharmaceutical Pain Intervention(s): Repositioned, Rest     Problem: Neurological  Goal: Maximum potential motor/sensory/cognitive function  Outcome: Ongoing  Note: Patient alert to person only. Patient has been impulsive at times. Nurse re-orienting the patient to his surroundings. Problem: Cardiovascular  Goal: No DVT, peripheral vascular complications  Outcome: Ongoing  Note: No signs or symptoms of DVT. Patient's lovenox is on hold. Goal: Hemodynamic stability  Outcome: Ongoing  Note: Vital signs have remained stable and within normal limits. Vitals being monitored every 4 hours and as needed. Continuous cardiac monitor in place. Heart rhythm is NSR.       Problem: Respiratory  Goal: No pulmonary complications  Outcome: Ongoing  Note: Patient on continuous bipap with an FIO2 30% with oxygen saturations above 92%. Continuous pulse ox in place. Patient has SOB with exertion. Lungs are diminished throughout. Goal: O2 Sat > 90%  Outcome: Ongoing  Note: Patient on continuous bipap with an FIO2 30% with oxygen saturations above 92%. Continuous pulse ox in place. Problem: GI  Goal: No bowel complications  Outcome: Ongoing  Note: Patient had 2 green/mucous BM's tonight. Abdomen is soft, rounded and non-tender. Hypoactive bowel sounds heard in all 4 quadrants. Problem: Skin Integrity/Risk  Goal: No skin breakdown during hospitalization  Outcome: Ongoing  Note: No new areas of skin breakdown noted tonight. Skin is warm and dry. Patient has multiple skin integrity issues (see doc flowsheets for list). Patient being turned and repositioned every 2 hours. Bilateral arms and heels elevated on pillows. EPC Cream applied to buttocks. Problem: Infection - Central Venous Catheter-Associated Bloodstream Infection:  Goal: Will show no infection signs and symptoms  Description: Will show no infection signs and symptoms  Outcome: Ongoing  Note: Patient has a PICC line line in his right upper arm. Dressing is clean, dry and intact. Problem: Urinary Elimination:  Goal: Signs and symptoms of infection will decrease  Description: Signs and symptoms of infection will decrease  Outcome: Ongoing  Note: Patient has a mason catheter for urinary retention. Mason is patent and draining clear, yellow urine. Problem: Discharge Planning:  Goal: Discharged to appropriate level of care  Description: Discharged to appropriate level of care  Outcome: Ongoing  Note: Patient from home, but social work helping with safe discharge plans. Care plan reviewed with patient. Patient verbalize understanding of the plan of care and contribute to goal setting.

## 2020-10-27 NOTE — PLAN OF CARE
Nutrition Problem #1: Inadequate oral intake  Intervention: Food and/or Nutrient Delivery: Continue NPO,  Parenteral Nutrition(Diet per Dr & SLP as pt tolerates)  Nutritional Goals: Patient will receive PN to meet 75% or more of estimated nutrient needs until able to initiate EN or oral diet.

## 2020-10-27 NOTE — PROGRESS NOTES
Comprehensive Nutrition Assessment    Type and Reason for Visit:  Reassess(TPN macronutrients)    Nutrition Recommendations/Plan:   Continue  bag of 3:1 TPN dosed on 88 kg & at 15 kcals/kg, 1.4 grams protein/kg & 30% lipid kcals. Will increase TPN macronutrients as pt tolerates. Pt currently NPO. GI on case. Diet per Dr & SLP  Nutrition Assessment:    Pt. Compromised  from a nutritional standpoint AEB NPO status, PICC, TPN & GI status.  Remains at risk for further nutritional compromise r/t continued NPO status, bleeding,   dysphagia, previous unsuccessful NGT placement attempts due to anatomical and behavioral issues, catabolic illness, increased nutrient needs to support wound healing, lengthy hospitalization due to positive covid-19, acute respiratory failure, intubated-extubated 10/15/20,need for Bipap,  sepsis, pneumonia, KRISTY, anemia, GI bleed (lf clot/ulceration at site of rectum), deconditioning, and underlying medical condition (history of obesity, DM, GERD, alcohol abuse, HLD, HTN, vitamin D deficiency), and need for nutrition support.  Nutrition recommendations/interventions as per above  Malnutrition Assessment:  Malnutrition Status: At risk for malnutrition (Comment)(no nutrition in ~5 days)    Context:  Acute Illness     Findings of the 6 clinical characteristics of malnutrition:  Energy Intake:  7 - 50% or less of estimated energy requirements for 5 or more days  Weight Loss:  Unable to assess(weight fluctuations with edema)     Body Fat Loss:  No significant body fat loss     Muscle Mass Loss:  No significant muscle mass loss    Fluid Accumulation:  (+1 and +2 edema) Extremities   Strength:  Not Performed    Estimated Daily Nutrient Needs:  Energy (kcal):  0115-3141 (30-32 kcals/kg IBW in active late phase);  Weight Used for Energy Requirements:  Ideal(70 kg - ideal weight)     Protein (g):  ~140 gms (2/kgm IBW) as renal status allows; Weight Used for Protein Requirements:  Ideal(70 kg) Fluid (ml/day):  per MD; Weight Used for Fluid Requirements:  (n/a)      Nutrition Related Findings:  Recent COVID pt , now testing  negative. Pt seen & is NPO, TPN infusing at 100 ml/hr, on Bipap. Spoke with pt who did answer at times, but difficult to follow. Spoke with Eden ESQUIVEL who is supervisor & reports pt is getting a little better , has needed  blood since Saturday, family does not want trach or PEG,  pt on continous Bipap & most likely will go to [facility]. 10/26 per  note \" A sigmoidoscopy was preformed and showed a large perianal ulcer extending into the anal canal.  GI stated there was nothing they could do to prevent re-bleeding at this point. \".  10/27 no PO4, Mg++ ordered for 10/28 & pt has needed replacement this admit. 10/27 Glucose 155, Hgb 7. 2. meds include vancomycin,zosyn,  iron, venofer, humalog & lantus. 5 stools recorded yesterday. Wounds:  (perineum unstageable-pressure, scotum skin tear)       Current Nutrition Therapies:    Current Parenteral Nutrition Orders:  · Type and Formula: 3-in-1 Custom(PICC line)   · Lipids: Daily  · Duration: Continuous  · Rate/Volume: 100 ml/hr  · Current PN Order Provides: &  . next bag of 3:1 TPN dosed on 88 kg &  15 kcals/kg, 1.4 grams protein/kg & 30% lipid kcals to provide 1320 kcals, 123 grams protein, 127 grams CHO & 40 grams fat/24 hours.  Plan to increase macronutrients as  pt tolerates       Anthropometric Measures:  · Height: 5' 8\" (172.7 cm)  · Current Body Weight: 285 lb 0.9 oz (129.3 kg)(10/27)   · Admission Body Weight: 285 lb (129.3 kg)(9/23/20, stated, +1 BLE and BUE edema)    · Usual Body Weight: 306 lb (138.8 kg)(EMR, 6/29/20, actual.  299# 8/15/20, bedscale.)     · Ideal Body Weight: 154 lbs; % Ideal Body Weight     · BMI: 47.1  · Adjusted Body Weight:  ; (193# (88 kg) adjusted for TPN)   · BMI Categories: Obese Class 3 (BMI 40.0 or greater)       Nutrition Diagnosis:   · Inadequate oral intake related to cognitive or neurological impairment, swallowing difficulty as evidenced by NPO or clear liquid status due to medical condition, nutrition support - parenteral nutrition      Nutrition Interventions:   Food and/or Nutrient Delivery:  Continue NPO, Parenteral Nutrition(Diet per Dr & SLP as pt tolerates)  Nutrition Education/Counseling:  No recommendation at this time   Coordination of Nutrition Care:  Continued Inpatient Monitoring    Goals:  Patient will receive PN to meet 75% or more of estimated nutrient needs until able to initiate EN or oral diet. Nutrition Monitoring and Evaluation:   Behavioral-Environmental Outcomes:  (n/a)   Food/Nutrient Intake Outcomes:  Diet Advancement/Tolerance, Parenteral Nutrition Intake/Tolerance  Physical Signs/Symptoms Outcomes:  Biochemical Data, Chewing or Swallowing, GI Status, Fluid Status or Edema, Nutrition Focused Physical Findings, Skin, Weight     Discharge Planning:     Too soon to determine     Electronically signed by Alexa Storey RD, LD on 10/27/20 at 9:44 AM EDT    Contact: [phone]

## 2020-10-27 NOTE — PROGRESS NOTES
80- This RN entered the patient's room to check on him and he was attempting to get out of the bed. This RN saw blood on the side of the bed rail and his left foot. The patient's left foot big toe was off and bleeding.

## 2020-10-27 NOTE — PROGRESS NOTES
[facility] THERAPY MISSED TREATMENT NOTE  [facility] ICU STEPDOWN TELEMETRY 4K      Date: 10/27/2020  Patient Name: Joy Lee        MRN: [id number]    : 1970  (44 y.o.)    REASON FOR MISSED TREATMENT:  Attempted to see patient for skilled speech therapy treatment session; upon attempt SIERRA Boyer reports \"patient not appropriate for ST services at this time d/t patient with need for continuous Bi-Pap. \"  Will hold therapy services at this time and plan to re-attempt 10/28.     ARINA Lockett 23

## 2020-10-27 NOTE — CARE COORDINATION
Update: from ICU prior, [facility] precert pending; collaborated with DASIA Morales, Attending  Electronically signed by Cinthia Ortega, RN on 10/27/2020 at 10:42 AM  Update: peer-peer option w Dr. Nano Rosenbaum @ [phone], option 5 before 9 am 10/28 as they need more information re: elimination plan, plan to control GI Bleed, IV AB plans; updated Attending  Electronically signed by Cinthia Ortega RN on 10/27/2020 at 3:54 PM

## 2020-10-27 NOTE — PROGRESS NOTES
Gastroenterology Progress Note:     Patient Name:  Alfred Neff   MRN: [id number]  [id number]  YOB: 1970  Admit Date: 9/23/2020  9:02 AM  Primary Care Physician: Chantell Belle MD   [id number]/[id number]     Patient seen and examined. 24 hours events and chart reviewed. Subjective: Patient resting in bed. He does nod to some questions. Denies abdominal pain. Per RN, he continues to have stools with bright red blood. Hgb 7.2 He is on continuous BiPAP. Per RN, he has been pulling the BiPAP mask on.     Objective:  BP (!) 108/56   Pulse 89   Temp 97.8 °F (36.6 °C) (Axillary)   Resp 18   Ht 5' 8\" (1.727 m)   Wt 285 lb 1.6 oz (129.3 kg)   SpO2 100%   BMI 43.35 kg/m²     Physical Exam:    General:  Acutely ill appearing male  HEENT: Atraumatic, normocephalic. Moist oral mucous membranes. Neck: Supple without adenopathy, JVD, thyromegaly or masses. Trachea midline. CV: Heart RRR, no murmurs, rubs, gallops. Resp: Lungs clear to ascultation bilaterally. Tachypneic. On BiPAP  Abd: Round, soft, nontender. No hepatosplenomegaly or mass present. Active bowel sounds heard. No distention noted. Ext:  Without cyanosis, clubbing, edema. Skin: Pink, warm, dry  Neuro:  Alert, oriented x 3 with no obvious deficits.        Rectal: Perianal wounds with ulcers, sarahy blood from anus    Labs:   CBC:   Lab Results   Component Value Date    WBC 10.9 10/27/2020    HGB 7.2 10/27/2020    HCT 23.1 10/27/2020    MCV 92.0 10/27/2020     10/27/2020     BMP:   Lab Results   Component Value Date     10/27/2020    K 4.8 10/27/2020    K 4.6 09/07/2020    CL 98 10/27/2020    CO2 31 10/27/2020    PHOS 3.6 10/27/2020    BUN 14 10/27/2020    CREATININE 1.5 10/27/2020    CALCIUM 9.5 10/27/2020     PT/INR:   Lab Results   Component Value Date    PROTIME 24.4 06/27/2018    INR 1.24 10/25/2020     Lipids:   Lab Results   Component Value Date    ALKPHOS 73 10/27/2020    ALT 12 10/27/2020    AST 14 10/27/2020 BILITOT 0.4 10/27/2020    BILIDIR 0.6 09/24/2020    LABALBU 2.9 10/27/2020    LABALBU 4.4 01/26/2012    LIPASE 34.9 08/05/2020     Significant Diagnostic Studies:   Flexible sigmoidoscopy 10/26/20 by Dr. Quincy Russo: large perianal ulcer extending into anal canal, no intervention performed    Current Meds:  Scheduled Meds:   pantoprazole  40 mg Intravenous BID    vancomycin  1,750 mg Intravenous Q24H    vancomycin (VANCOCIN) intermittent dosing (placeholder)   Other RX Placeholder    sodium chloride flush  10 mL Intravenous 2 times per day    ferrous sulfate  325 mg Oral BID WC    insulin lispro  0-12 Units Subcutaneous Q6H    piperacillin-tazobactam  3.375 g Intravenous Q8H    [Held by provider] gabapentin  400 mg Oral BID    [Held by provider] modafinil  100 mg Oral Daily    [Held by provider] enoxaparin  40 mg Subcutaneous BID    insulin glargine  38 Units Subcutaneous Nightly    lidocaine 1 % injection  5 mL Intradermal Once    magnesium replacement protocol   Other RX Placeholder    phosphorus replacement protocol   Other RX Placeholder    calcium replacement protocol   Other RX Placeholder    [Held by provider] ARIPiprazole  15 mg Oral Daily    [Held by provider] aspirin  81 mg Oral Daily    glycopyrrolate-formoterol  2 puff Inhalation BID     Continuous Infusions:   sodium chloride      PN-Adult  3 IN 1 Central Line (Custom) 100 mL/hr at 10/26/20 1727    dextrose         Assessment:  43 yo M admitted 09/23/20 for fatigue. Recently admitted 09/06/20 for almost 10 days with hypoxemic respiratory failure secondary to COVID-19. Received Decadron and Danazol. Complained of worsening SOB & progressive hypoxia for which he was intubated. Bronchoscopy 09/27/20. Extubated 10/02/20. 10/06/20 became more obtunded & progressive respiratory failure with acute oliguric renal failure, was re-intubated. Hypotensive requiring pressor support, UO declined.  He had a rectal tube for a few weeks which was over-inflated, when it was deflated & removed, he was found to have oozing of bright red blood from the rectum, therefore GI was consulted 10/12/20. It was recommended to hold anticoagulants, keep the rectal tube out, and supportive care. Started on Zyvox. Extubated and now on BiPAP during the day. [facility] and will need to follow outpatient with a different GI practice. GI re-consulted 10/25/20 for passing large, bloody clot. 1. Acute rectal bleeding- secondary to large perianal ulcer extending into anal canal  2. Acute hypoxemic respiratory failure secondary to #3  3. COVID-19 positive  4. Pneumonia  5. Sepsis secondary to #3 & 4  6. KRISTY   7. Hypotension  8. Acute on chronic anemia- stable, s/p 2 units PRBC  9. DM  10. H/O HTN  11. KIARA  12. Diastolic heart failure   13. Iron deficiency  14. Acute diarrhea  15.  Very acutely ill patient      Plan:    · Monitor H & H, transfuse prn  · PO PPI daily  · Nursing to monitor stool output  · PO Iron when patient is able to safely have PO intake  · Wound & ostomy on board  · Electrolyte management per nephrology  · ATBs per ID  · ST following  · TPN per primary  · Nephrology & ID on board  · Supportive care per primary team  Will follow intermittently    Case reviewed and impression/plan reviewed in collaboration with Dr. Devin Don  Electronically signed by SANDRA Harris - CNP on 10/27/2020 at 10:32 AM    [facility]

## 2020-10-27 NOTE — PROGRESS NOTES
0301  Gross per 24 hour   Intake 3779.14 ml   Output 3450 ml   Net 329.14 ml     Last 3 weights  Wt Readings from Last 3 Encounters:   10/27/20 285 lb 1.6 oz (129.3 kg)   09/15/20 281 lb 6.4 oz (127.6 kg)   08/21/20 (!) 306 lb 6.4 oz (139 kg)           Physical Exam :  General Appearance: Obese well-nourished no distress  CNS- opens eyes to speech  Psych-not agitated  Lungs diminished  Abdomen: Soft non-tender  Musculoskeletal:  Edema -no edema           Last 3 CBC   Recent Labs     10/25/20  0840 10/26/20  0620 10/26/20  0655 10/26/20  1600 10/27/20  0315   WBC 10.2 9.4  --   --  10.9*   RBC 2.58* 2.37*  --   --  2.51*   HGB 7.3* 6.6* 6.6* 7.6* 7.2*   HCT 24.5* 22.1* 22.0* 24.4* 23.1*    296  --   --  357     Last 3 CMP  Recent Labs     10/25/20  0310 10/25/20  0840 10/26/20  0226 10/27/20  0315    143 141 138   K 4.6 4.4 4.5 4.8    107 104 98   CO2 27 28 30 31   BUN 15 14 12 14   CREATININE 1.5* 1.4* 1.5* 1.5*   CALCIUM 9.3 9.5 9.7 9.5   LABALBU 2.8* 2.9*  --  2.9*   BILITOT 0.4 0.5  --  0.4             ASSESSMENT / Plan   1 Renal -acute kidney injury most likely due to septic ATN/hypotension  ? Improving with some hydration with TPN  Currently at 1. 5  ? May be developing a new baseline  ? As needed diuretics    2 Electrolytes - doing well. Supplemented by TPN. arvin watch K level getting a lot of kcl in TPN. Consider decreasing the potassium  3 Anemia- S/P post rectal clot. GI on board. Had an endoscopy done which showed rectal ulcer  4 Hypotension - stable  5 Hx of diabetes mellitus  6 Hypercalcemia-improving follow for now no need for any bisphosphonate this is hypercalcemia of immobility. improved corrected calcium is only ~ 10.7  7 Pneumonia on abx - follow vanc levels closley ID on board  8 Hx of COVID-19 pneumonia   9 Hx of diastolic dysfunction volume status reasonable closely follow.   As needed diuretics  10 Meds reviewed      EMELY Da Silva D.  [facility].

## 2020-10-27 NOTE — PROGRESS NOTES
Hospitalist Progress Note      Patient:  Kenneth Bloom    Unit/Bed:4K-10/010-A  YOB: 1970  MRN: [id number]   Acct: [de-identified]     PCP: Jose Estrella MD  Date of Admission: 9/23/2020     Date of Service: Pt seen/examined on 10/27/20  and Admitted to Inpatient with expected LOS greater than two midnights due to medical therapy. Chief Complaint:  Shortness of Breath    Assessment and Plan:    1.) Sepsis due to Covid Pneumonia and Bacteremia: Initially resolved, now having intermittent fever again and today leukocytosis is worsening, WBC 15.1 from 11 yesterday. Complicated medical course. Recent discharge from [facility] for COVID-19 on 9/15/2020. Readmitted for COVID-19 pneumonia on 9/23/2020, intubation on 9/23/2020 and 10/6/2020. Successfully weaned to room air currently. Per ICU notes patient would be a good candidate for Zyvox, however due to patient's inability to swallow he has been unable to receive Zyvox. 10/23: Has been treated with vancomycin, doxycycline (10/6-10/14), Zosyn (10/6-10/16) throughout his hospital stay. Likely MRSA pneumonia. -Previously had been afebrile however patient febrile to 100.4 on 10/21/2020. Febrile with low grade at 100.0 rectally 10/22/2020. Patient with residual fever morning of 10/23/2020 up to 103.0 °F      -Patient noted to be septic again with fever, tachycardia, and tachypnea. -ID has been consulted. Possible source of infection including recurrent pneumonia, rectal wound, PICC line. -IV Zosyn restarted, ID recommending 5-7 day course. We will add IV vancomycin with pharmacy to dose due to recurrent fevers. -Repeat blood cultures are pending      -Chest x-ray showing worsening infiltrates.        -Will order UA and urine culture, pt is on mason cath for urinary retention, last changed was 10/20 per RN      -Patient unable to perform repeat FEES exam due to decompensation    -Unable to place NG tube previously. If PICC line removed patient may not be able to receive nutrition. Patient is at high risk for decompensation. 10/24: Following ID recommendations, Continue Vanc/Zosyn    - TPN started, IVF 1/2 NS at 60 ml/hr    - Blood Culture (+) for Gram Positive cocci in clusters    - Common Respiratory Panel negative    - Lactic Acid normal, ProCal 4.58     10/26: Following ID recommendations, Continue Vanc/Zosyn    - Strep Pneumonia and Legionella Antibody negative    - Trend CBC daily     10/27: Following ID recommendations, Continue Vanc/Zosyn    - Trend CBC daily    2.) Acute hypoxic respiratory failure due to COVID-19 pneumonia:  Intubated on 10/6/2020, extubated on 10/15/2020.    10/23: Currently saturating well on room air, however patient became more tachypneic      10/24: Patient on 2L NC      10/26: Still requiring BiPAP continuously       10/27: On BiPAP continuously, would benefit from trach, however the father declines at this time. 3.) KRISTY due to hypotension (Improving):  Creatinine 1.1 on admission. Trended up to 4.1 on 10/7/2020. Baseline creatinine 0.6 to 0.8. Nephrology following. Appreciate nephrology input. 10/23: Creatinine stable at 1.7 today    - Nephrology recommending continuing IV hydration      10/24: Cr 1.5 today, trending down    - Nephrology switched to 1/2 NS at 60 ml/hr      10/26: Cr 1.5, Stable, Nephrology states this may be new baseline    - As needed diuretics    - IVF to prevent Hypotension     10/27: Cr 1.5, Stable, likely new baseline    - As needed diuretics    - IVF to prevent Hypotension    4.) Hypernatremia, likely due to dehydration (Resolved):  Off D5W, Initially resolved, now sodium 146 on 10/23.    10/24: Continue 1/2 NS as above per Nephrology    - Recheck BMP in am      10/26: Continue IVF as above    - Recheck IVF     10/27: Sodium 138    - Continue IVF as above    - Recheck BMP in AM    5.) Acute encephalopathy, etiology unclear, likely related to recent Covid 19 infection: 10/23 saw Worsening mentation today with fevers. Patient likely septic again due to unknown etiology. Infectious causes include rectal wound, pneumonia, PICC line infection. CT head on 10/20/2020 unremarkable. 10/24: Continue to track mentation     10/26: Mentation improving     10/27: Able to hold some conversation, unknown baseline    6.) Acute on chronic Normocytic anemia due to acute rectal bleeding- secondary to gluteal/rectal ulcerations vs hemodilutional from IVF, (Improving): Hemoglobin of 8.6 today. Baseline Hgb 7-8 in 10/2020, was around 11 in 9/2020. S/p 2 units PRBC. Blood clot seen by nursing staff today likely secondary to rectal ulcer. Patient does have rectal bleed from rectal ulcer from Flexi-Seal.        10/23: Continue to monitor hemoglobin      - Transfuse for hemoglobin less than 7      - GI on-board. Sign off due to unlikely GI bleed and rectal bleeding likely secondary to ulceration. 10/24: Continue to Monitor bleeding. If rate worsens, consider checking Fibrinogen and PLTs for possible coagulopathy      - Transfuse if <7      - check H-H at 1400, CBC in am          10/26: Received 1 Unit PRBC      - Recheck CBC and H&H      - Monitor Bleeding from rectum       - GI states supportive care and good nutrition as treatment      - Transfuse if HgB <7          10/27: Receive 1 unit of PRBC in afternoon      - Recheck CBC and H&H      - Monitor for more passing clots, supportive care at this time      - Transfuse if HgB <7     7.) Hypokalemia due to hypomagnesemia and diarrhea, (Resolved): Replace per protocol, BMP in am .     8.) Hypomagnesemia likely due to poor oral intake and diarrhea, (Resolving): Replace per protocol, check magnesium level in am.     9.) Dysphagia, At risk for Malnutrition: Patient failed Fiberoptic Endoscopic Evaluation of the Swallow (FEES). ST recommend NPO, per ST NGT not an option right now due to failed FEES.   Started TPN temporarily on 10/21. ST plan to repeat FEES on Friday 10/23. However due to patient's fevers and decompensation unable to perform. 10/24: TPN in use, follow Dietary recommendations for nutrition and Free Water           10/27: Patient not a good candidate for PEG unless off of BiPAP      - Patient's father does not want a tracheostomy performed at this time     10.) Diarrhea: C. diff test ordered by GI, but not done. May repeat if continue diarrhea.     11.) Sarahi-rectal lesion: Wound ostomy on-board, appreciate input. Cont zinc oxide as ordered.      12. ) Hx of KIARA: Noncompliant with CPAP at home, Pulmonology consulted, using BiPAP. Settings: PEEP 6cm H2O              FiO2 30%              O2 flow rate 2 L/min     13.)  Diabetes mellitus type 2:  Blood sugars within goal range of 140-180. Continue Lantus at current dose and sliding scale insulin. Accu-Cheks q. 4 and at bedtime, Hypoglycemia protocol in place     14.)  Essential hypertension:  Uncontrolled today due to pt not receiving PO meds ( on amlodipine, cardizem and hydralazine PO at home). IV hydralazine prn ordered. Avoid hypotension with recent sepsis and renal failure.     15.) Physical Deconditioning:  Patient likely will need SNF versus LTAC at discharge. [place] evaluation consult ordered. Palliative care following in discussed case with father who primary decision-maker. Code Status discussion should be made.     16.) Thrombocytosis, likely reactive due to recent COVID-19 (Resolved): , recheck with CBC in am     17.) Mild hypercalcemia, likely from dehydration (Resolved): Ca 9.0, Continue IVF per Nephrology. Recheck with daily BMP.     18.) Urinary retention: On mason cath, check UA and urine culture.             10/24:  No Results back on Urine Culture    Disposition Plan: TBD based on clinical course    History Of Present Illness:      Mr. Geovanna Hinds is a  44year-old morbidly obese black male lifetime non-smoker. Alonso Cosme has a history of morbid obesity associated with type 2 diabetes mellitus, prior alcohol abuse, hyperlipidemia, hypertension, hypogonadism, nonalcoholic steatohepatitis, obstructive sleep apnea, and gout.  Patient was hospitalized 9/6/2020 through 9/15/2020 with hypoxemic respiratory failure secondary to COVID-19 associated with diffuse bilateral infiltrates.  At that time, patient received Decadron, remdesivir, and danazol.  During hospitalization, he had issues with atrial fibrillation.  His insulin therapy required adjustment.  He was discharged home on subcutaneous Lovenox. Patient returned back to the emergency room on 9/23/2020 with increasing SOB and progressive hypoxia. CXR showed diffuse infiltrates.  Deteriorated and required intubation. Patient underwent bronchoscopy on 9/27/2020 which demonstrated no mucus production. Patient extubated 10/2/2020. Patient reintubated for progressive respiratory failure with progressive obtundation on 10/6/2020.  This was associated with acute oliguric renal failure.  Patient was intubated 10/6/2020, and underwent volume resuscitation.  Fractional excretion of sodium returned less than 1 which was consistent with prerenal azotemia.  After volume resuscitation, patient demonstrated that he was hemoconcentrated with a drop of 2 g in the hemoglobin. With volume resuscitation, urine output did improve. After patient was intubated on 10/6/2020, he underwent pulmonary lavage which showed MRSA on the PCR but no other organism.  However, patient was found to have greater than 200 white blood cells in the urine.  Patient was treated with Zosyn and doxycycline. Previously, patient had received vancomycin and developed fever while on vancomycin. Therefore vancomycin was not continued.  Sputum subsequently grew staph aureus.  Zosyn was discontinued but doxycycline continued on 10/14/2020.     Patient did well with spontaneous breathing trial and was extubated 10/15/2020.     10/21: Weaned to room air. Respiratory status remained stable. Spiked fever this morning at 100.4 °F.  Possibly due to rectal wound due to Flexi-Seal.  Restarted on IV Zosyn, ID reconsulted. Patient would be a good candidate for SNF versus LTACH at discharge     10/22: Doing well on room air today. Afebrile. Continue Zosyn for 5 to 7 days. [place] evaluation. Continue zinc oxide for rectal wound. Blood pressure is elevated today, IV hydralazine for BP greater than 160     10/23: Recurrent fevers up to 103.0 °F today. Lactic acid ordered and normal.  Restarted vancomycin per ID. Possible PICC line infection versus worsening pneumonia. IV fluid bolus given. Patient tachypneic and tachycardic this morning, improved post fluid administration. 10/24: Patient lethargic and only responds to name. He is unable to stay awake for a complete exam.  He was placed on BiPAP overnight. He did not have a fever overnight. Patient was restarted on Vancomycin yesterday per ID. Patient had mild tachypnea early in the night, however this resolved by morning. The Patient was normocardic throughout the night. Following ID for advice and recommendations. 10/26: Patient more awake today than previous. He is able to answer questions, however limited due to constant use of BiPAP. He was placed in ICU yesterday due to continued blood in his stools as well as   Hypotension. He required 1 unite PRBC this morning. He was not intubated overnight. A sigmoidoscopy was preformed and showed a large perianal ulcer extending into the anal canal.  GI stated there was nothing they could do to prevent re-bleeding at this point. 10/27: Patient able to answer questions better this morning, however he is still tired/lethargic and cannot stay awake for the entire exam.  He denies chest pain. He points to his mask and states that it is painful and itchy.   The Night time nurse reports that he had 3 episodes of passing large clots per his rectum. He has been grabbing at the BiPAP mask and has been succesful in taking it off occasionally, but he desatruates and needs it placed back on. He hit is toe against the bedrail and ripped off his right toenail. The nurse kept it in a sample cup. His HgB is at 7.2 (From 7.6 the night prior). Will recheck HgB later today and transfuse if necsessary. GI states only conservative management can be done for the perianal ulcer at this time. They also state that the patient is not a good candidate for PEG tube placement unless he is off of BiPAP. The patient's father does not want to place a tracheostomy at this time. Patient till on TPN. Past Medical History, Past Surgical History, Allergies, Medications, Social History, Family History reviewed in H&P and remain unchanged from admission. Diet:  Diet NPO Time Specified  PN-Adult  3 IN 1 Central Line (Custom)    Review of Systems:      Review of Systems - General ROS: negative for - chills, fatigue or fever  Respiratory ROS: positive for - shortness of breath  negative for - cough, hemoptysis or wheezing  Cardiovascular ROS: no chest pain or dyspnea on exertion  Gastrointestinal ROS: positive for - blood in stools  negative for - abdominal pain or nausea/vomiting  Genito-Urinary ROS: no dysuria, trouble voiding, or hematuria  Musculoskeletal ROS: positive for - muscular weakness  negative for - joint stiffness, joint swelling or muscle pain  Neurological ROS: negative for - bowel and bladder control changes, headaches or numbness/tingling  Dermatological ROS: positive for - dry skin  negative for - pruritus, rash or skin lesion changes    Physical exam:    BP (!) 108/56   Pulse 89   Temp 97.8 °F (36.6 °C) (Axillary)   Resp 19   Ht 5' 8\" (1.727 m)   Wt 285 lb 1.6 oz (129.3 kg)   SpO2 98%   BMI 43.35 kg/m²     General appearance:   On BiPAP, laying in bed, Able to answer questions appropriately, Obese  HEENT: Normal cephalic, atraumatic without obvious deformity. Pupils equal, round, and reactive to light. Conjunctivae/corneas clear. Neck: Supple, with full range of motion. No jugular venous distention. Trachea midline. Respiratory:  On BiPAP, increased respiratory effort, coarse breath sounds, difficult to hear beyond noise from BiPAP machine. Cardiovascular:  Regular Rate and rhythm with normal S1/S2 without murmurs, rubs or gallops. Abdomen: Soft, non-tender, non-distended with normal bowel sounds. Musculoskeletal:  No clubbing or cyanosis, edema in lower limbs bilaterally. Full range of motion without deformity. Skin: Skin color, texture, turgor normal.  No rashes or lesions. Dry skin around borders of face  Neurologic:  Limited due to inability to follow commands/stay awake  Capillary Refill: Brisk,< 3 seconds   Peripheral Pulses: +2 palpable, equal bilaterally       Labs:     Recent Labs     10/25/20  0840 10/26/20  0620 10/26/20  0655 10/26/20  1600 10/27/20  0315   WBC 10.2 9.4  --   --  10.9*   HGB 7.3* 6.6* 6.6* 7.6* 7.2*   HCT 24.5* 22.1* 22.0* 24.4* 23.1*    296  --   --  357     Recent Labs     10/25/20  0310 10/25/20  0840 10/26/20  0226 10/27/20  0315    143 141 138   K 4.6 4.4 4.5 4.8    107 104 98   CO2 27 28 30 31   BUN 15 14 12 14   CREATININE 1.5* 1.4* 1.5* 1.5*   CALCIUM 9.3 9.5 9.7 9.5   PHOS 3.7  --  3.4  --      Recent Labs     10/25/20  0310 10/25/20  0840 10/27/20  0315   AST 13 14 14   ALT 12 12 12   BILITOT 0.4 0.5 0.4   ALKPHOS 70 75 73     Recent Labs     10/25/20  0310   INR 1.24*     No results for input(s): [place] Dec in the last 72 hours. Urinalysis:      Lab Results   Component Value Date    NITRU NEGATIVE 10/06/2020    WBCUA > 200 10/06/2020    BACTERIA FEW 10/06/2020    RBCUA 5-10 10/06/2020    BLOODU LARGE 10/06/2020    SPECGRAV >=1.030 10/06/2020    GLUCOSEU NEGATIVE 08/05/2020       Intake & Output:  I/O last 3 completed shifts:   In: 3779.1 [I.V.:2856; Blood:229.2]  Out: 3450 [Urine:3450]  No intake/output data recorded. Radiology:     CXR 10/23: I have reviewed the CXR with the following interpretation: Poor inflation of lungs, borderline heart size, no effusions, PICC line right with catheter tip in cavoatrial junction, mild infiltrate scattering in both lungs, worsened appearance from prior study    XR CHEST PORTABLE   Final Result   1. Poor inflation lungs. Borderline heart size. No effusion. 2. Right arm PICC line, catheter tip the cavoatrial junction. 3. Mild infiltrate scattered in both lungs and left infrahilar region. 4. Overall appearance slightly worsened from prior study. **This report has been created using voice recognition software. It may contain minor errors which are inherent in voice recognition technology. **      Final report electronically signed by Dr. Chana Trejo on 10/23/2020 10:46 AM      CT ABDOMEN PELVIS WO CONTRAST Additional Contrast? None   Final Result   1. Almost complete resolution of previously seen airspace infiltrates in the lung bases. 2. No acute abdominal or pelvic abnormalities. **This report has been created using voice recognition software. It may contain minor errors which are inherent in voice recognition technology. **      Final report electronically signed by Dr. Wilton Clarke on 10/20/2020 10:50 AM      CT HEAD WO CONTRAST   Final Result    No evidence of acute intracranial abnormality. **This report has been created using voice recognition software. It may contain minor errors which are inherent in voice recognition technology. **      Final report electronically signed by Dr. Lyudmila Brothers MD on 10/20/2020 10:13 AM      XR CHEST PORTABLE   Final Result   Ill-defined bilateral lung opacities. Follow-up as clinically indicated. This document has been electronically signed by:  Micah Byers MD on 10/20/2020 05:38 AM         XR CHEST PORTABLE   Final Result   Minimal residual atelectasis and/or infiltrate within the bilateral mid    and lower lungs with improvement. Improved pulmonary inflation. This document has been electronically signed by: Claudia Arora MD on    10/16/2020 02:02 AM         XR CHEST PORTABLE   Final Result   Impression:   Progressive bilateral consolidations or ARDS. This document has been electronically signed by: Claudia Sandoval MD on    10/12/2020 04:59 AM         XR CHEST PORTABLE   Final Result    Similar bilateral ill-defined pneumonia. This document has been electronically signed by: Robby Mcintosh. En Diehl DO on    10/11/2020 09:49 AM         XR CHEST PORTABLE   Final Result   Similar diffuse patchy bilateral airspace disease and consolidations. Support devices as above. This document has been electronically signed by: Kevyn Duque MD on    10/10/2020 04:35 AM         XR CHEST PORTABLE   Final Result   No acute findings. This document has been electronically signed by: Anastasiia Serrato MD on 10/08/2020 07:40 AM         CT ABDOMEN PELVIS WO CONTRAST Additional Contrast? Oral   Final Result    IMPRESSION:   Bilateral lower lobe pneumonia. Known Covid. Continued progress imaging is advised   Distended colon with fluid, air and stool. Correlate for diarrhea. **This report has been created using voice recognition software. It may contain minor errors which are inherent in voice recognition technology. **      Final report electronically signed by Dr. Leta Mckeonllor on 10/7/2020 6:49 PM      XR CHEST PORTABLE   Final Result   Bilateral lung consolidation not significant change. This document has been electronically signed by: Anastasiia Serrato MD on 10/07/2020 07:25 AM         US RENAL COMPLETE   Final Result   Unremarkable kidneys.       This document has been electronically signed by: Helen Quintero MD on    10/07/2020 01:48 AM         XR CHEST PORTABLE Final Result   1. There is a new endotracheal tube with the distal tip 1.8 cm above the level of the madi. 2. There is a new esophageal tube with the distal tip projecting over the gastric fundus. 3. There is a stable right PICC with the distal tip projecting over the right atrium. 4. The lung volumes are diminished. There are bilateral perihilar and the basilar opacities which are similar to the previous examination however may be accentuated by the expiratory technique. There is no pleural effusion. Follow-up chest radiographs    are recommended to confirm complete resolution. **This report has been created using voice recognition software. It may contain minor errors which are inherent in voice recognition technology. **      Final report electronically signed by Dr. Saskia Izquierdo on 10/6/2020 7:18 AM      XR CHEST PORTABLE   Final Result   Bilateral lung opacities. Follow-up to clearing recommended. This document has been electronically signed by: Nixon Douglas MD on 10/06/2020 06:09 AM         XR CHEST PORTABLE   Final Result   Slightly decreased diffuse patchy bilateral airspace disease. Support devices as above. This document has been electronically signed by: Maddy Michael MD on    10/03/2020 05:15 AM         XR CHEST PORTABLE   Final Result   ET tube should be retracted 1.5-2.0 cm. No significant change in coarse scattered bilateral infiltrates. This document has been electronically signed by: Ina Sanchez MD on    09/30/2020 06:50 AM         XR CHEST PORTABLE   Final Result      Slightly improving bilateral pneumonia. **This report has been created using voice recognition software. It may contain minor errors which are inherent in voice recognition technology. **      Final report electronically signed by Dr. Lexi Marcos on 9/27/2020 2:23 PM      XR ABDOMEN FOR NG/OG/NE TUBE PLACEMENT   Final Result   Esophageal route tube tip in the stomach. **This report has been created using voice recognition software. It may contain minor errors which are inherent in voice recognition technology. **      Final report electronically signed by Dr William Taylor on 9/26/2020 10:22 AM      XR CHEST PORTABLE   Final Result   1. Lines and tubes as above. 2. Worsening bilateral pneumonia. **This report has been created using voice recognition software. It may contain minor errors which are inherent in voice recognition technology. **      Final report electronically signed by Dr. Berenice Irby on 9/24/2020 7:38 AM      XR CHEST PORTABLE   Final Result   1. Endotracheal tube terminates 3.1 cm above the madi. 2.  Orogastric tube in the stomach. 3.  Patchy opacities in the right upper lobe and lingula. This document has been electronically signed by: Horacio Haile MD on    09/24/2020 12:35 AM         XR CHEST PORTABLE   Final Result   1. Bilateral pulmonary opacification worse than on previous study dated 10 September 2020. This may represent worsening inflammatory process, possibly Covid 19 infection. Please correlate clinically   2. Borderline cardiomegaly. **This report has been created using voice recognition software. It may contain minor errors which are inherent in voice recognition technology. **      Final report electronically signed by DR Micah Hogue on 9/23/2020 10:47 AM           DVT prophylaxis: Lovenox, held for GI bleed, initiate SCDs    Code Status: Limited    PT/OT Eval Status: Not [facility] [address] Problems    Diagnosis Date Noted    Sepsis due to COVID-19 ([facility] [address] [U07.1, A41.89]     KRISTY (acute kidney injury) ([facility] [address] [N17.9]     Hypernatremia [E87.0]     Acute encephalopathy [G93.40]     Acute on chronic anemia [D64.9]     Hypomagnesemia [E83.42]     Dysphagia [R13.10]     Diarrhea [R19.7]     At risk for malnutrition [Z91.89]     Reactive thrombocytosis [R79.89]     Pneumonia due to COVID-19 virus [U07.1, J12.89] 10/18/2020    ARF (acute renal failure) with tubular necrosis (HCC) [N17.0]     Hypokalemia [E87.6]     Other hypotension [I95.89]     Acute respiratory failure with hypoxia ([address] [J96.01]     COVID-19 virus infection [U07.1] 09/06/2020    Essential hypertension [I10]        Thank you Jose Estrella MD for the opportunity to be involved in this patient's care.     Electronically signed by Lin Vasquez DO on 10/27/2020 at 9:50 AM

## 2020-10-27 NOTE — PROGRESS NOTES
Progress note: Infectious diseases    Patient - Don Colin,  Age - 44 y.o.    - 1970      Room Number - 4K-10/010-A   MRN -  [id number]   Acct # - [de-identified]  Date of Admission -  2020  9:02 AM    SUBJECTIVE:   He has low grade fever. +anemia  OBJECTIVE   VITALS    height is 5' 8\" (1.727 m) and weight is 285 lb 1.6 oz (129.3 kg). His axillary temperature is 100 °F (37.8 °C). His blood pressure is 133/75 and his pulse is 98. His respiration is 15 and oxygen saturation is 96%. Wt Readings from Last 3 Encounters:   10/27/20 285 lb 1.6 oz (129.3 kg)   09/15/20 281 lb 6.4 oz (127.6 kg)   20 (!) 306 lb 6.4 oz (139 kg)       I/O (24 Hours)    Intake/Output Summary (Last 24 hours) at 10/27/2020 1333  Last data filed at 10/27/2020 1148  Gross per 24 hour   Intake 3549.97 ml   Output 3450 ml   Net 99.97 ml       General Appearance :on BIPAP, lethargic  HEENT - normocephalic, atraumatic, pale  conjunctiva,  anicteric sclera dry oral cavity  Neck - Supple, no mass  Lungs -  Bilateral   air entry, diminished breath sound  Cardiovascular - Heart sounds are normal.    Abdomen - soft, not distended, nontender,   Neurologic -Lethargic, on BIPAP  Skin - No bruising or bleeding  Extremities - chronic leg edema, perianal open wound. Dimas in place.     MEDICATIONS:      sodium chloride  20 mL Intravenous Once    pantoprazole  40 mg Intravenous BID    vancomycin  1,750 mg Intravenous Q24H    vancomycin (VANCOCIN) intermittent dosing (placeholder)   Other RX Placeholder    sodium chloride flush  10 mL Intravenous 2 times per day    ferrous sulfate  325 mg Oral BID WC    insulin lispro  0-12 Units Subcutaneous Q6H    piperacillin-tazobactam  3.375 g Intravenous Q8H    [Held by provider] gabapentin  400 mg Oral BID    [Held by provider] modafinil  100 mg Oral Daily    [Held by provider] enoxaparin  40 mg Subcutaneous BID    insulin glargine  38 Units Subcutaneous Nightly    lidocaine 1 % injection  5 mL Intradermal Once    magnesium replacement protocol   Other RX Placeholder    phosphorus replacement protocol   Other RX Placeholder    calcium replacement protocol   Other RX Placeholder    [Held by provider] ARIPiprazole  15 mg Oral Daily    [Held by provider] aspirin  81 mg Oral Daily    glycopyrrolate-formoterol  2 puff Inhalation BID      PN-Adult  3 IN 1 Central Line (Custom)      sodium chloride      PN-Adult  3 IN 1 Central Line (Custom) 100 mL/hr at 10/26/20 1727    dextrose       sodium chloride flush, hydrALAZINE, acetaminophen, potassium chloride **OR** potassium alternative oral replacement **OR** potassium chloride, potassium chloride, lidocaine, acetaminophen **OR** [DISCONTINUED] acetaminophen, polyethylene glycol, promethazine **OR** ondansetron, albuterol, glucose, dextrose, glucagon (rDNA), dextrose      LABS:     CBC:   Recent Labs     10/25/20  0840 10/26/20  0620  10/26/20  1600 10/27/20  0315 10/27/20  1220   WBC 10.2 9.4  --   --  10.9*  --    HGB 7.3* 6.6*   < > 7.6* 7.2* 6.1*    296  --   --  357  --     < > = values in this interval not displayed. BMP:    Recent Labs     10/25/20  0840 10/26/20  0226 10/27/20  0315    141 138   K 4.4 4.5 4.8    104 98   CO2 28 30 31   BUN 14 12 14   CREATININE 1.4* 1.5* 1.5*   GLUCOSE 135* 137* 155*     Calcium:  Recent Labs     10/27/20  0315   CALCIUM 9.5     Ionized Calcium:No results for input(s): IONCA in the last 72 hours. Magnesium:  Recent Labs     10/27/20  1000   MG 1.8     Phosphorus:  Recent Labs     10/27/20  1000   PHOS 3.6     BNP:No results for input(s): BNP in the last 72 hours. Glucose:  Recent Labs     10/26/20  2318 10/27/20  0538 10/27/20  1045   POCGLU 145* 185* 162*     HgbA1C: No results for input(s): LABA1C in the last 72 hours.   INR:   Recent Labs     10/25/20  0310   INR 1.24*     Hepatic: Recent Labs     10/25/20  0310 10/25/20  0840 10/27/20  0315   ALKPHOS 70 75 73   ALT 12 12 12   AST 13 14 14   PROT 6.0* 6.0* 6.1   BILITOT 0.4 0.5 0.4   LABALBU 2.8* 2.9* 2.9*        CULTURES:   UA: No results for input(s): SPECGRAV, PHUR, COLORU, CLARITYU, MUCUS, PROTEINU, BLOODU, RBCUA, WBCUA, BACTERIA, NITRU, GLUCOSEU, BILIRUBINUR, UROBILINOGEN, KETUA, LABCAST, LABCASTTY, AMORPHOS in the last 72 hours. Invalid input(s): CRYSTALS  Micro:   Lab Results   Component Value Date    BC No growth-preliminary  10/22/2020    BC  10/22/2020     possible contamination; clinical correlation required           Problem list of patient:     Patient Active Problem List   Diagnosis Code    Chronic diastolic congestive heart failure (HCC) I50.32    Atrial fibrillation ([facility]) I48.91    BPH (benign prostatic hyperplasia) N40.0    Carpal tunnel syndrome on right G56.01    Chronic gout M1A. 9XX0    DM2 (diabetes mellitus, type 2) ([facility] [address] E11.9    Heart failure with preserved ejection fraction ([facility]) I50.30    History of alcohol abuse F10.11    History of osteomyelitis Z87.39    Essential hypertension I10    Hypogonadism, male E29.1    Major depression F32.9    Morbid obesity ([facility]) E66.01    DURAN (nonalcoholic steatohepatitis) K75.81    KIARA (obstructive sleep apnea) G47.33    Vitamin D deficiency E55.9    Normocytic anemia D64.9    Physical deconditioning R53.81    Mood disorder ([facility]) F39    Hallucinations R44.3    GERD (gastroesophageal reflux disease) K21.9    Wound of buttock S31.809A    Chest wall pain with tenderness R07.89    Primary osteoarthritis of left hip M16.12    COVID-19 U07.1    Acute respiratory failure due to COVID-19 (HCC) U07.1, J96.00    Acute respiratory failure with hypoxia ([facility]) J96.01    ARF (acute renal failure) with tubular necrosis ([facility]) N17.0    Hypokalemia E87.6    Other hypotension I95.89    Pneumonia due to COVID-19 virus U07.1, J12.89    Sepsis due to [facility]) U07.1, A41.89    KRISTY (acute kidney injury) ([facility] [address] N17.9    Hypernatremia E87.0    Acute encephalopathy G93.40    Acute on chronic anemia D64.9    Hypomagnesemia E83.42    Dysphagia R13.10    Diarrhea R19.7    At risk for malnutrition Z91.89    Reactive thrombocytosis R79.89         ASSESSMENT/PLAN   Respiratory failure following COVID -19 infection: on BIPAP. Anemia:  Had rectal bleed related to flexiseal: evaluated by GI had upper endoscopy and sigmoidoscopy  Deconditioning  Ulceration of the rectum with intermittent bleed due to flexiseal    Obesity  CHF  Deconditioning.      Darion aPtel MD, FACP 10/27/2020 1:33 PM

## 2020-10-28 LAB
ALBUMIN SERPL-MCNC: 3 G/DL (ref 3.5–5.1)
ALP BLD-CCNC: 67 U/L (ref 38–126)
ALT SERPL-CCNC: 11 U/L (ref 11–66)
ANION GAP SERPL CALCULATED.3IONS-SCNC: 10 MEQ/L (ref 8–16)
AST SERPL-CCNC: 13 U/L (ref 5–40)
BILIRUB SERPL-MCNC: 0.6 MG/DL (ref 0.3–1.2)
BUN BLDV-MCNC: 17 MG/DL (ref 7–22)
CALCIUM IONIZED: 1.21 MMOL/L (ref 1.12–1.32)
CALCIUM SERPL-MCNC: 9.6 MG/DL (ref 8.5–10.5)
CHLORIDE BLD-SCNC: 101 MEQ/L (ref 98–111)
CO2: 30 MEQ/L (ref 23–33)
CREAT SERPL-MCNC: 1.3 MG/DL (ref 0.4–1.2)
ERYTHROCYTE [DISTWIDTH] IN BLOOD BY AUTOMATED COUNT: 16.7 % (ref 11.5–14.5)
ERYTHROCYTE [DISTWIDTH] IN BLOOD BY AUTOMATED COUNT: 55.5 FL (ref 35–45)
ERYTHROPOIETIN: 37 MU/ML (ref 4–27)
GFR SERPL CREATININE-BSD FRML MDRD: 70 ML/MIN/1.73M2
GLUCOSE BLD-MCNC: 160 MG/DL (ref 70–108)
GLUCOSE BLD-MCNC: 161 MG/DL (ref 70–108)
GLUCOSE BLD-MCNC: 164 MG/DL (ref 70–108)
GLUCOSE BLD-MCNC: 165 MG/DL (ref 70–108)
GLUCOSE BLD-MCNC: 166 MG/DL (ref 70–108)
GLUCOSE BLD-MCNC: 189 MG/DL (ref 70–108)
HCT VFR BLD CALC: 24.7 % (ref 42–52)
HCT VFR BLD CALC: 24.8 % (ref 42–52)
HEMOGLOBIN: 7.9 GM/DL (ref 14–18)
HEMOGLOBIN: 7.9 GM/DL (ref 14–18)
MAGNESIUM: 1.9 MG/DL (ref 1.6–2.4)
MCH RBC QN AUTO: 29.7 PG (ref 26–33)
MCHC RBC AUTO-ENTMCNC: 31.9 GM/DL (ref 32.2–35.5)
MCV RBC AUTO: 93.2 FL (ref 80–94)
PHOSPHORUS: 3.9 MG/DL (ref 2.4–4.7)
PLATELET # BLD: 315 THOU/MM3 (ref 130–400)
PMV BLD AUTO: 10.8 FL (ref 9.4–12.4)
POTASSIUM SERPL-SCNC: 5 MEQ/L (ref 3.5–5.2)
RBC # BLD: 2.66 MILL/MM3 (ref 4.7–6.1)
SODIUM BLD-SCNC: 141 MEQ/L (ref 135–145)
TOTAL PROTEIN: 6 G/DL (ref 6.1–8)
WBC # BLD: 11.1 THOU/MM3 (ref 4.8–10.8)

## 2020-10-28 PROCEDURE — 99232 SBSQ HOSP IP/OBS MODERATE 35: CPT | Performed by: INTERNAL MEDICINE

## 2020-10-28 PROCEDURE — 85027 COMPLETE CBC AUTOMATED: CPT

## 2020-10-28 PROCEDURE — 6360000002 HC RX W HCPCS: Performed by: HOSPITALIST

## 2020-10-28 PROCEDURE — 87040 BLOOD CULTURE FOR BACTERIA: CPT

## 2020-10-28 PROCEDURE — 83735 ASSAY OF MAGNESIUM: CPT

## 2020-10-28 PROCEDURE — 2500000003 HC RX 250 WO HCPCS: Performed by: PHARMACIST

## 2020-10-28 PROCEDURE — 94660 CPAP INITIATION&MGMT: CPT

## 2020-10-28 PROCEDURE — 36592 COLLECT BLOOD FROM PICC: CPT

## 2020-10-28 PROCEDURE — 6360000002 HC RX W HCPCS: Performed by: FAMILY MEDICINE

## 2020-10-28 PROCEDURE — 99233 SBSQ HOSP IP/OBS HIGH 50: CPT | Performed by: HOSPITALIST

## 2020-10-28 PROCEDURE — 84100 ASSAY OF PHOSPHORUS: CPT

## 2020-10-28 PROCEDURE — 2060000000 HC ICU INTERMEDIATE R&B

## 2020-10-28 PROCEDURE — 82948 REAGENT STRIP/BLOOD GLUCOSE: CPT

## 2020-10-28 PROCEDURE — 82330 ASSAY OF CALCIUM: CPT

## 2020-10-28 PROCEDURE — 2580000003 HC RX 258: Performed by: FAMILY MEDICINE

## 2020-10-28 PROCEDURE — 6370000000 HC RX 637 (ALT 250 FOR IP): Performed by: INTERNAL MEDICINE

## 2020-10-28 PROCEDURE — C9113 INJ PANTOPRAZOLE SODIUM, VIA: HCPCS | Performed by: FAMILY MEDICINE

## 2020-10-28 PROCEDURE — 36415 COLL VENOUS BLD VENIPUNCTURE: CPT

## 2020-10-28 PROCEDURE — 80053 COMPREHEN METABOLIC PANEL: CPT

## 2020-10-28 PROCEDURE — 2580000003 HC RX 258: Performed by: STUDENT IN AN ORGANIZED HEALTH CARE EDUCATION/TRAINING PROGRAM

## 2020-10-28 PROCEDURE — 94761 N-INVAS EAR/PLS OXIMETRY MLT: CPT

## 2020-10-28 PROCEDURE — 94640 AIRWAY INHALATION TREATMENT: CPT

## 2020-10-28 PROCEDURE — 6360000002 HC RX W HCPCS: Performed by: STUDENT IN AN ORGANIZED HEALTH CARE EDUCATION/TRAINING PROGRAM

## 2020-10-28 RX ADMIN — PANTOPRAZOLE SODIUM 40 MG: 40 INJECTION, POWDER, FOR SOLUTION INTRAVENOUS at 22:15

## 2020-10-28 RX ADMIN — PIPERACILLIN AND TAZOBACTAM 3.38 G: 3; .375 INJECTION, POWDER, LYOPHILIZED, FOR SOLUTION INTRAVENOUS at 11:32

## 2020-10-28 RX ADMIN — PIPERACILLIN AND TAZOBACTAM 3.38 G: 3; .375 INJECTION, POWDER, LYOPHILIZED, FOR SOLUTION INTRAVENOUS at 03:08

## 2020-10-28 RX ADMIN — Medication 10 ML: at 12:06

## 2020-10-28 RX ADMIN — VANCOMYCIN HYDROCHLORIDE 1750 MG: 5 INJECTION, POWDER, LYOPHILIZED, FOR SOLUTION INTRAVENOUS at 12:06

## 2020-10-28 RX ADMIN — SODIUM CHLORIDE, PRESERVATIVE FREE 10 ML: 5 INJECTION INTRAVENOUS at 08:11

## 2020-10-28 RX ADMIN — INSULIN GLARGINE 38 UNITS: 100 INJECTION, SOLUTION SUBCUTANEOUS at 22:15

## 2020-10-28 RX ADMIN — INSULIN LISPRO 2 UNITS: 100 INJECTION, SOLUTION INTRAVENOUS; SUBCUTANEOUS at 23:51

## 2020-10-28 RX ADMIN — INSULIN LISPRO 2 UNITS: 100 INJECTION, SOLUTION INTRAVENOUS; SUBCUTANEOUS at 05:50

## 2020-10-28 RX ADMIN — INSULIN LISPRO 2 UNITS: 100 INJECTION, SOLUTION INTRAVENOUS; SUBCUTANEOUS at 11:33

## 2020-10-28 RX ADMIN — SODIUM CHLORIDE: 234 INJECTION INTRAMUSCULAR; INTRAVENOUS; SUBCUTANEOUS at 17:39

## 2020-10-28 RX ADMIN — GLYCOPYRROLATE AND FORMOTEROL FUMARATE 2 PUFF: 9; 4.8 AEROSOL, METERED RESPIRATORY (INHALATION) at 18:10

## 2020-10-28 RX ADMIN — PIPERACILLIN AND TAZOBACTAM 3.38 G: 3; .375 INJECTION, POWDER, LYOPHILIZED, FOR SOLUTION INTRAVENOUS at 19:53

## 2020-10-28 RX ADMIN — PANTOPRAZOLE SODIUM 40 MG: 40 INJECTION, POWDER, FOR SOLUTION INTRAVENOUS at 08:12

## 2020-10-28 RX ADMIN — Medication 10 ML: at 11:32

## 2020-10-28 RX ADMIN — SODIUM CHLORIDE, PRESERVATIVE FREE 10 ML: 5 INJECTION INTRAVENOUS at 19:53

## 2020-10-28 RX ADMIN — INSULIN LISPRO 2 UNITS: 100 INJECTION, SOLUTION INTRAVENOUS; SUBCUTANEOUS at 17:29

## 2020-10-28 RX ADMIN — ONDANSETRON 4 MG: 2 INJECTION INTRAMUSCULAR; INTRAVENOUS at 11:32

## 2020-10-28 RX ADMIN — INSULIN LISPRO 2 UNITS: 100 INJECTION, SOLUTION INTRAVENOUS; SUBCUTANEOUS at 00:26

## 2020-10-28 ASSESSMENT — PAIN SCALES - GENERAL
PAINLEVEL_OUTOF10: 0

## 2020-10-28 NOTE — PROGRESS NOTES
Fluid (ml/day):  per MD; Method Used for Fluid Requirements:  (n/a)      Nutrition Related Findings:  recent COVID pt , now negative, TPN at 100 ml/hr, pt on contininous Bipap resting. Pt is difficult to follow, opens eyes. Reported pt  does not want trach or PEG . Pt has needed blood several times. 5 bms recorded yesterday. Hgb 7.9 , K+ 5, WBC 11.1, glucose 164. meds include ATB, iron, veneofer, humalog & Lantus. Discussion of [facility]  GI following. Per Dr note 10/26 \"A sigmoidoscopy was preformed and showed a large perianal ulcer extending into the anal canal.  GI stated there was nothing they could do to prevent re-bleeding at this point. \". SLP on case, but pt on continuous Bipap at this point.      Wounds:  (perineum unstageable-pressure, scotum skin tear)       Current Nutrition Therapies:    Current Parenteral Nutrition Orders:  · Type and Formula: 3-in-1 Custom(PICC line)   · Lipids: Daily  · Duration: Continuous  · Rate/Volume: 100 ml/hr  · Current PN Order Provides: dosed 88 kg &  15 kcals/kg, 1.4 grams protein/kg & 30% lipid kcals to provide 1320 kcals, 123 grams protein, 127 grams CHO & 40 grams fat/24 hours  ·  next bag 3:1 TPN dosed on 88 kg  increase to 20 kcals/kg, 1.5 grams prorein & 30% lipid kcals to provide 1760 kcals, 132 grams protein, 207 grams CHO & 53 grams fat/24 hours      Anthropometric Measures:  · Height: 5' 8\" (172.7 cm)  · Current Body Weight: 287 lb 4.2 oz (130.3 kg)(10/28)   · Admission Body Weight: 285 lb (129.3 kg)(9/23/20, stated, +1 BLE and BUE edema)    · Usual Body Weight: 306 lb (138.8 kg)(EMR, 6/29/20, actual.  299# 8/15/20, bedscale.)     · Ideal Body Weight: 154 lbs;     · BMI: 47.1  · Adjusted Body Weight:  ; (193# (88 kg) adjusted for TPN)   · BMI Categories: Obese Class 3 (BMI 40.0 or greater)       Nutrition Diagnosis:   · Inadequate oral intake related to cognitive or neurological impairment, swallowing difficulty as evidenced by NPO o status due to medical condition, nutrition support - parenteral nutrition      Nutrition Interventions:   Food and/or Nutrient Delivery:  Continue NPO, Modify Parenteral Nutrition(Diet per Dr & SLP as pt tolerates)  Nutrition Education/Counseling:  No recommendation at this time   Coordination of Nutrition Care:  Continued Inpatient Monitoring    Goals:  Patient will receive PN to meet 75% or more of estimated nutrient needs until able to initiate EN or oral diet. Nutrition Monitoring and Evaluation:   Behavioral-Environmental Outcomes:  (n/a)   Food/Nutrient Intake Outcomes:  Diet Advancement/Tolerance, Parenteral Nutrition Intake/Tolerance  Physical Signs/Symptoms Outcomes:  Biochemical Data, Chewing or Swallowing, GI Status, Fluid Status or Edema, Nutrition Focused Physical Findings, Skin, Weight     Discharge Planning:     Too soon to determine     Electronically signed by Pablo Andersen, RD, LD on 10/28/20 at 9:59 AM EDT    Contact: [phone]

## 2020-10-28 NOTE — PROGRESS NOTES
Hospitalist Progress Note      Patient:  Theresa Cowan    Unit/Bed:4K-10/010-A  YOB: 1970  MRN: [id number]   Acct: [de-identified]     PCP: Belen Ashton MD  Date of Admission: 9/23/2020     Date of Service: Pt seen/examined on 10/28/20  and Admitted to Inpatient with expected LOS greater than two midnights due to medical therapy. Chief Complaint:  Shortness of Breath    Assessment and Plan:    1.) Sepsis due to Covid Pneumonia and Bacteremia: Initially resolved, now having intermittent fever again and today leukocytosis is worsening, WBC 15.1 from 11 yesterday. Complicated medical course. Recent discharge from [facility] for COVID-19 on 9/15/2020. Readmitted for COVID-19 pneumonia on 9/23/2020, intubation on 9/23/2020 and 10/6/2020. Successfully weaned to room air currently. Per ICU notes patient would be a good candidate for Zyvox, however due to patient's inability to swallow he has been unable to receive Zyvox. 10/23: Has been treated with vancomycin, doxycycline (10/6-10/14), Zosyn (10/6-10/16) throughout his hospital stay. Likely MRSA pneumonia. -Previously had been afebrile however patient febrile to 100.4 on 10/21/2020. Febrile with low grade at 100.0 rectally 10/22/2020. Patient with residual fever morning of 10/23/2020 up to 103.0 °F      -Patient noted to be septic again with fever, tachycardia, and tachypnea. -ID has been consulted. Possible source of infection including recurrent pneumonia, rectal wound, PICC line. -IV Zosyn restarted, ID recommending 5-7 day course. We will add IV vancomycin with pharmacy to dose due to recurrent fevers. -Repeat blood cultures are pending      -Chest x-ray showing worsening infiltrates.        -Will order UA and urine culture, pt is on mason cath for urinary retention, last changed was 10/20 per RN      -Patient unable to perform repeat FEES exam due to decompensation    -Unable to place NG tube previously. If PICC line removed patient may not be able to receive nutrition. Patient is at high risk for decompensation. 10/24: Following ID recommendations, Continue Vanc/Zosyn    - TPN started, IVF 1/2 NS at 60 ml/hr    - Blood Culture (+) for Gram Positive cocci in clusters    - Common Respiratory Panel negative    - Lactic Acid normal, ProCal 4.58     10/26: Following ID recommendations, Continue Vanc/Zosyn    - Strep Pneumonia and Legionella Antibody negative    - Trend CBC daily     10/27: Following ID recommendations, Continue Vanc/Zosyn    - Trend CBC daily    - WBC 10.9     10/28: Following ID, Vanco/Zosyn to continue for 1 week more    - Trend CBC    - WBC 11.1    2.) Acute hypoxic respiratory failure due to COVID-19 pneumonia:  Intubated on 10/6/2020, extubated on 10/15/2020.    10/23: Currently saturating well on room air, however patient became more tachypneic      10/24: Patient on 2L NC      10/26: Still requiring BiPAP continuously       10/27: On BiPAP continuously, would benefit from trach, however the father declines at this time. 10/28: On BiPAP continuously. Will try breaks if Saturation remains stable. 3.) KRISTY due to hypotension (Improving):  Creatinine 1.1 on admission. Trended up to 4.1 on 10/7/2020. Baseline creatinine 0.6 to 0.8. Nephrology following. Appreciate nephrology input.     10/23: Creatinine stable at 1.7 today    - Nephrology recommending continuing IV hydration      10/24: Cr 1.5 today, trending down    - Nephrology switched to 1/2 NS at 60 ml/hr      10/26: Cr 1.5, Stable, Nephrology states this may be new baseline    - As needed diuretics    - IVF to prevent Hypotension     10/27: Cr 1.5, Stable, likely new baseline    - As needed diuretics    - IVF to prevent Hypotension     10/28: Cr 1.3, Slight decrease    - Following with Nephrology recommendations    - Diuretics as needed, IVF for Hypotension    4.) Hypernatremia, likely due to dehydration (Resolved): Off D5W, Initially resolved, now sodium 146 on 10/23.    10/24: Continue 1/2 NS as above per Nephrology    - Recheck BMP in am      10/26: Continue IVF as above    - Recheck IVF     10/27: Sodium 138    - Continue IVF as above    - Recheck BMP in AM     10/28: Sodium 141    - Continue IVF    - TPN adjusted by Dietary to ensure no overload of electrolytes. 5.) Acute encephalopathy, etiology unclear, likely related to recent Covid 19 infection: 10/23 saw Worsening mentation today with fevers. Patient likely septic again due to unknown etiology. Infectious causes include rectal wound, pneumonia, PICC line infection. CT head on 10/20/2020 unremarkable. 10/24: Continue to track mentation     10/26: Mentation improving     10/27: Able to hold some conversation, unknown baseline     10/28: Mention seems to be improving, but still unable to hold a meaningful conversation. 6.) Acute on chronic Normocytic anemia due to acute rectal bleeding- secondary to gluteal/rectal ulcerations vs hemodilutional from IVF, (Improving): Hemoglobin of 8.6 today. Baseline Hgb 7-8 in 10/2020, was around 11 in 9/2020. S/p 2 units PRBC. Blood clot seen by nursing staff today likely secondary to rectal ulcer. Patient does have rectal bleed from rectal ulcer from Flexi-Seal.        10/23: Continue to monitor hemoglobin      - Transfuse for hemoglobin less than 7      - GI on-board. Sign off due to unlikely GI bleed and rectal bleeding likely secondary to ulceration. 10/24: Continue to Monitor bleeding.   If rate worsens, consider checking Fibrinogen and PLTs for possible coagulopathy      - Transfuse if <7      - check H-H at 1400, CBC in am          10/26: Received 1 Unit PRBC      - Recheck CBC and H&H      - Monitor Bleeding from rectum       - GI states supportive care and good nutrition as treatment      - Transfuse if HgB <7          10/27: Receive 1 unit of PRBC in afternoon      - Recheck CBC and H&H      - Monitor for more passing clots, supportive care at this time      - Transfuse if HgB <7          10/28: Received 1 unit PRBC in early morning      - Recheck CBC and H&H, (recheck showed HgB 7.9, stable)      - No clots reported overnight      - Transfuse if HgB <7     7.) Hypokalemia due to hypomagnesemia and diarrhea, (Resolved): Replace per protocol, BMP in am .     8.) Hypomagnesemia likely due to poor oral intake and diarrhea, (Resolving): Replace per protocol, check magnesium level in am.     9.) Dysphagia, At risk for Malnutrition: Patient failed Fiberoptic Endoscopic Evaluation of the Swallow (FEES). ST recommend NPO, per ST NGT not an option right now due to failed FEES. Started TPN temporarily on 10/21.  plan to repeat FEES on Friday 10/23. However due to patient's fevers and decompensation unable to perform. 10/24: TPN in use, follow Dietary recommendations for nutrition and Free Water           10/27: Patient not a good candidate for PEG unless off of BiPAP      - Patient's father does not want a tracheostomy performed at this time     10.) Diarrhea: C. diff test ordered by GI, but not done. May repeat if continue diarrhea.     11.) Sarahi-rectal lesion: Wound ostomy on-board, appreciate input. Cont zinc oxide as ordered.      12. ) Hx of KIARA: Noncompliant with CPAP at home, Pulmonology consulted, using BiPAP. Settings: PEEP 6cm H2O              FiO2 30%              O2 flow rate 2 L/min     13.)  Diabetes mellitus type 2:  Blood sugars within goal range of 140-180. Continue Lantus at current dose and sliding scale insulin. Accu-Cheks q. 4 and at bedtime, Hypoglycemia protocol in place     14.)  Essential hypertension:  Uncontrolled today due to pt not receiving PO meds ( on amlodipine, cardizem and hydralazine PO at home). IV hydralazine prn ordered.   Avoid hypotension with recent sepsis and renal failure.     15.) Physical Deconditioning:  Patient likely will need SNF versus LTAC at patient demonstrated that he was hemoconcentrated with a drop of 2 g in the hemoglobin. With volume resuscitation, urine output did improve. After patient was intubated on 10/6/2020, he underwent pulmonary lavage which showed MRSA on the PCR but no other organism.  However, patient was found to have greater than 200 white blood cells in the urine.  Patient was treated with Zosyn and doxycycline. Previously, patient had received vancomycin and developed fever while on vancomycin. Therefore vancomycin was not continued.  Sputum subsequently grew staph aureus.  Zosyn was discontinued but doxycycline continued on 10/14/2020. Patient did well with spontaneous breathing trial and was extubated 10/15/2020.     10/21: Weaned to room air. Respiratory status remained stable. Spiked fever this morning at 100.4 °F.  Possibly due to rectal wound due to Flexi-Seal.  Restarted on IV Zosyn, ID reconsulted. Patient would be a good candidate for SNF versus LTACH at discharge     10/22: Doing well on room air today. Afebrile. Continue Zosyn for 5 to 7 days. [place] evaluation. Continue zinc oxide for rectal wound. Blood pressure is elevated today, IV hydralazine for BP greater than 160     10/23: Recurrent fevers up to 103.0 °F today. Lactic acid ordered and normal.  Restarted vancomycin per ID. Possible PICC line infection versus worsening pneumonia. IV fluid bolus given. Patient tachypneic and tachycardic this morning, improved post fluid administration. 10/24: Patient lethargic and only responds to name. He is unable to stay awake for a complete exam.  He was placed on BiPAP overnight. He did not have a fever overnight. Patient was restarted on Vancomycin yesterday per ID. Patient had mild tachypnea early in the night, however this resolved by morning. The Patient was normocardic throughout the night. Following ID for advice and recommendations. 10/26: Patient more awake today than previous.   He is able to answer questions, however limited due to constant use of BiPAP. He was placed in ICU yesterday due to continued blood in his stools as well as   Hypotension. He required 1 unite PRBC this morning. He was not intubated overnight. A sigmoidoscopy was preformed and showed a large perianal ulcer extending into the anal canal.  GI stated there was nothing they could do to prevent re-bleeding at this point. 10/27: Patient able to answer questions better this morning, however he is still tired/lethargic and cannot stay awake for the entire exam.  He denies chest pain. He points to his mask and states that it is painful and itchy. The Night time nurse reports that he had 3 episodes of passing large clots per his rectum. He has been grabbing at the BiPAP mask and has been succesful in taking it off occasionally, but he desatruates and needs it placed back on. He hit is toe against the bedrail and ripped off his right toenail. The nurse kept it in a sample cup. His HgB is at 7.2 (From 7.6 the night prior). Will recheck HgB later today and transfuse if necsessary. GI states only conservative management can be done for the perianal ulcer at this time. They also state that the patient is not a good candidate for PEG tube placement unless he is off of BiPAP. The patient's father does not want to place a tracheostomy at this time. Patient till on TPN. 10/28: Patient able to answer some questions today. Still lethargic on exam.  He is still removing his BiPAP frequently, although his saturation drops below 90 when it is not on. There were no reported episodes of clots passed overnight. He had a BM that was mostly green in consistency. He required 1 unit of PRBC overnight, HgB currently stable at 7.9. Pre Cert was denied for [place]aye, starting appeal process. Antibiotics to continue for another week. Nothing else can be done at this time except transfusions and antibiotics.   POA states no trach, which means that he will be unable to get a PEG tube. Past Medical History, Past Surgical History, Allergies, Medications, Social History, Family History reviewed in H&P and remain unchanged from admission. Diet:  Diet NPO Time Specified  PN-Adult  3 IN 1 Central Line (Custom)    Review of Systems:      Review of Systems - General ROS: negative for - chills, fatigue or fever  Respiratory ROS: positive for - shortness of breath  negative for - cough, hemoptysis or wheezing  Cardiovascular ROS: no chest pain or dyspnea on exertion  Gastrointestinal ROS: positive for - blood in stools  negative for - abdominal pain or nausea/vomiting  Genito-Urinary ROS: no dysuria, trouble voiding, or hematuria  Musculoskeletal ROS: positive for - muscular weakness  negative for - joint stiffness, joint swelling or muscle pain  Neurological ROS: negative for - bowel and bladder control changes, headaches or numbness/tingling  Dermatological ROS: positive for - dry skin  negative for - pruritus, rash or skin lesion changes    Physical exam:    /89   Pulse 91   Temp 99.6 °F (37.6 °C) (Axillary)   Resp 18   Ht 5' 8\" (1.727 m)   Wt 287 lb 4.8 oz (130.3 kg)   SpO2 99%   BMI 43.68 kg/m²     General appearance: On BiPAP, laying in bed, Able to answer questions appropriately, Obese  HEENT:  Normal cephalic, atraumatic without obvious deformity. Pupils equal, round, and reactive to light. Conjunctivae/corneas clear. Neck: Supple, with full range of motion. No jugular venous distention. Trachea midline. Respiratory:  On BiPAP, increased respiratory effort, coarse breath sounds, difficult to hear beyond noise from BiPAP machine. Cardiovascular:  Regular Rate and rhythm with normal S1/S2 without murmurs, rubs or gallops. Abdomen: Soft, non-tender, non-distended with normal bowel sounds. Musculoskeletal:  No clubbing or cyanosis, edema in lower limbs bilaterally. Full range of motion without deformity.   Skin: Skin color, texture, turgor normal.  No rashes or lesions. Dry skin around borders of face  Neurologic:  Limited due to inability to follow commands/stay awake  Capillary Refill: Brisk,< 3 seconds   Peripheral Pulses: +2 palpable, equal bilaterally       Labs:     Recent Labs     10/26/20  0620  10/27/20  0315  10/27/20  1850 10/27/20  2355 10/28/20  0920   WBC 9.4  --  10.9*  --   --   --  11.1*   HGB 6.6*   < > 7.2*   < > 6.8* 7.9* 7.9*   HCT 22.1*   < > 23.1*   < > 21.6* 24.7* 24.8*     --  357  --   --   --  315    < > = values in this interval not displayed. Recent Labs     10/26/20  0226 10/27/20  0315 10/27/20  1000 10/28/20  0440    138  --  141   K 4.5 4.8  --  5.0    98  --  101   CO2 30 31  --  30   BUN 12 14  --  17   CREATININE 1.5* 1.5*  --  1.3*   CALCIUM 9.7 9.5  --  9.6   PHOS 3.4  --  3.6 3.9     Recent Labs     10/27/20  0315 10/28/20  0440   AST 14 13   ALT 12 11   BILITOT 0.4 0.6   ALKPHOS 73 67     No results for input(s): INR in the last 72 hours. No results for input(s): Shanique Fernándezens in the last 72 hours. Urinalysis:      Lab Results   Component Value Date    NITRU NEGATIVE 10/06/2020    WBCUA > 200 10/06/2020    BACTERIA FEW 10/06/2020    RBCUA 5-10 10/06/2020    BLOODU LARGE 10/06/2020    SPECGRAV >=1.030 10/06/2020    GLUCOSEU NEGATIVE 08/05/2020       Intake & Output:  I/O last 3 completed shifts: In: 4047.1 [I.V.:3355.8; Blood:691.3]  Out: 2000 [Urine:2000]  No intake/output data recorded. Radiology:     CXR 10/23: I have reviewed the CXR with the following interpretation: Poor inflation of lungs, borderline heart size, no effusions, PICC line right with catheter tip in cavoatrial junction, mild infiltrate scattering in both lungs, worsened appearance from prior study    XR CHEST PORTABLE   Final Result   1. Poor inflation lungs. Borderline heart size. No effusion. 2. Right arm PICC line, catheter tip the cavoatrial junction.    3. Mild infiltrate scattered in both lungs and left infrahilar region. 4. Overall appearance slightly worsened from prior study. **This report has been created using voice recognition software. It may contain minor errors which are inherent in voice recognition technology. **      Final report electronically signed by Dr. Codey Henley on 10/23/2020 10:46 AM      CT ABDOMEN PELVIS WO CONTRAST Additional Contrast? None   Final Result   1. Almost complete resolution of previously seen airspace infiltrates in the lung bases. 2. No acute abdominal or pelvic abnormalities. **This report has been created using voice recognition software. It may contain minor errors which are inherent in voice recognition technology. **      Final report electronically signed by Dr. Romero Campos on 10/20/2020 10:50 AM      CT HEAD WO CONTRAST   Final Result    No evidence of acute intracranial abnormality. **This report has been created using voice recognition software. It may contain minor errors which are inherent in voice recognition technology. **      Final report electronically signed by Dr. Alysha Armstrong MD on 10/20/2020 10:13 AM      XR CHEST PORTABLE   Final Result   Ill-defined bilateral lung opacities. Follow-up as clinically indicated. This document has been electronically signed by: Trell Long MD on 10/20/2020 05:38 AM         XR CHEST PORTABLE   Final Result   Minimal residual atelectasis and/or infiltrate within the bilateral mid    and lower lungs with improvement. Improved pulmonary inflation. This document has been electronically signed by: Stewart Kaba MD on    10/16/2020 02:02 AM         XR CHEST PORTABLE   Final Result   Impression:   Progressive bilateral consolidations or ARDS. This document has been electronically signed by: Carlos Mayfield MD on    10/12/2020 04:59 AM         XR CHEST PORTABLE   Final Result    Similar bilateral ill-defined pneumonia.       This document has been electronically signed by: Deena Oliveira. Con Hernandez DO on    10/11/2020 09:49 AM         XR CHEST PORTABLE   Final Result   Similar diffuse patchy bilateral airspace disease and consolidations. Support devices as above. This document has been electronically signed by: Ingrid Santos MD on    10/10/2020 04:35 AM         XR CHEST PORTABLE   Final Result   No acute findings. This document has been electronically signed by: Sandrita Keita MD on 10/08/2020 07:40 AM         CT ABDOMEN PELVIS WO CONTRAST Additional Contrast? Oral   Final Result    IMPRESSION:   Bilateral lower lobe pneumonia. Known Covid. Continued progress imaging is advised   Distended colon with fluid, air and stool. Correlate for diarrhea. **This report has been created using voice recognition software. It may contain minor errors which are inherent in voice recognition technology. **      Final report electronically signed by Dr. Roanne Romberg on 10/7/2020 6:49 PM      XR CHEST PORTABLE   Final Result   Bilateral lung consolidation not significant change. This document has been electronically signed by: Sandrita Keita MD on 10/07/2020 07:25 AM         US RENAL COMPLETE   Final Result   Unremarkable kidneys. This document has been electronically signed by: Gela Poon MD on    10/07/2020 01:48 AM         XR CHEST PORTABLE   Final Result   1. There is a new endotracheal tube with the distal tip 1.8 cm above the level of the madi. 2. There is a new esophageal tube with the distal tip projecting over the gastric fundus. 3. There is a stable right PICC with the distal tip projecting over the right atrium. 4. The lung volumes are diminished. There are bilateral perihilar and the basilar opacities which are similar to the previous examination however may be accentuated by the expiratory technique. There is no pleural effusion.  Follow-up chest radiographs    are recommended to confirm complete resolution. **This report has been created using voice recognition software. It may contain minor errors which are inherent in voice recognition technology. **      Final report electronically signed by Dr. Emily Zamora on 10/6/2020 7:18 AM      XR CHEST PORTABLE   Final Result   Bilateral lung opacities. Follow-up to clearing recommended. This document has been electronically signed by: Reynold Day MD on 10/06/2020 06:09 AM         XR CHEST PORTABLE   Final Result   Slightly decreased diffuse patchy bilateral airspace disease. Support devices as above. This document has been electronically signed by: Aileen Barkley MD on    10/03/2020 05:15 AM         XR CHEST PORTABLE   Final Result   ET tube should be retracted 1.5-2.0 cm. No significant change in coarse scattered bilateral infiltrates. This document has been electronically signed by: Viji López MD on    09/30/2020 06:50 AM         XR CHEST PORTABLE   Final Result      Slightly improving bilateral pneumonia. **This report has been created using voice recognition software. It may contain minor errors which are inherent in voice recognition technology. **      Final report electronically signed by Dr. Ayn Waldrop on 9/27/2020 2:23 PM      XR ABDOMEN FOR NG/OG/NE TUBE PLACEMENT   Final Result   Esophageal route tube tip in the stomach. **This report has been created using voice recognition software. It may contain minor errors which are inherent in voice recognition technology. **      Final report electronically signed by Dr Mariel Alcantar on 9/26/2020 10:22 AM      XR CHEST PORTABLE   Final Result   1. Lines and tubes as above. 2. Worsening bilateral pneumonia. **This report has been created using voice recognition software. It may contain minor errors which are inherent in voice recognition technology. **      Final report electronically signed by Dr. Any Waldrop on 9/24/2020 7:38 AM      XR CHEST PORTABLE   Final Result   1. Endotracheal tube terminates 3.1 cm above the madi. 2.  Orogastric tube in the stomach. 3.  Patchy opacities in the right upper lobe and lingula. This document has been electronically signed by: Alfredo Yu MD on    09/24/2020 12:35 AM         XR CHEST PORTABLE   Final Result   1. Bilateral pulmonary opacification worse than on previous study dated 10 September 2020. This may represent worsening inflammatory process, possibly Covid 19 infection. Please correlate clinically   2. Borderline cardiomegaly. **This report has been created using voice recognition software. It may contain minor errors which are inherent in voice recognition technology. **      Final report electronically signed by DR Nick Lopez on 9/23/2020 10:47 AM           DVT prophylaxis: Lovenox, held for GI bleed, initiate SCDs    Code Status: Limited    PT/OT Eval Status: Not [facility] Problems    Diagnosis Date Noted    Sepsis due to COVID-19 ([address] [U07.1, A41.89]     KRISTY (acute kidney injury) ([address] [N17.9]     Hypernatremia [E87.0]     Acute encephalopathy [G93.40]     Acute on chronic anemia [D64.9]     Hypomagnesemia [E83.42]     Dysphagia [R13.10]     Diarrhea [R19.7]     At risk for malnutrition [Z91.89]     Reactive thrombocytosis [R79.89]     Pneumonia due to COVID-19 virus [U07.1, J12.89] 10/18/2020    ARF (acute renal failure) with tubular necrosis (HCC) [N17.0]     Hypokalemia [E87.6]     Other hypotension [I95.89]     Acute respiratory failure with hypoxia (HCC) [J96.01]     Acute respiratory failure due to COVID-19 ([address] [U07.1, J96.00] 09/06/2020    Essential hypertension [I10]        Thank you Corby Manzanares MD for the opportunity to be involved in this patient's care.     Electronically signed by Delfin Sykes DO on 10/28/2020 at 11:18 AM

## 2020-10-28 NOTE — PLAN OF CARE
Problem: Nutrition  Goal: Optimal nutrition therapy  Outcome: Ongoing  Note: TPN infusing at 100ml/hr. Problem: Serum Glucose Level - Abnormal:  Goal: Ability to maintain appropriate glucose levels will improve  Description: Ability to maintain appropriate glucose levels will improve  Outcome: Ongoing  Note: Continuing to check blood sugar every 6 hours and administer insulin as ordered. Problem: Musculor/Skeletal Functional Status  Goal: Absence of falls  Outcome: Ongoing  Note: Fall precautions in place. Call light and bedside table within reach. Bed locked and in lowest position. Bed alarm on. Hourly rounding. Problem: Pain Control  Goal: Maintain pain level at or below patient's acceptable level (or 5 if patient is unable to determine acceptable level)  Outcome: Ongoing  Note: Pain Assessment: 0-10  Pain Level: 0   Pain goal:   0  Is pain goal met at this time? Yes     Additional interventions to be implemented: none       Problem: Neurological  Goal: Maximum potential motor/sensory/cognitive function  Outcome: Ongoing  Note: Patient alert to person only. Problem: Cardiovascular  Goal: No DVT, peripheral vascular complications  Outcome: Ongoing  Note: No signs of DVT this shift. Problem: Respiratory  Goal: O2 Sat > 90%  Outcome: Ongoing  Note: 02 sats remain >90% on continuous bipap. Problem: GI  Goal: No bowel complications  Outcome: Ongoing  Note: Bowl sounds hypoactive x4, abdomen soft and round. Problem: Skin Integrity/Risk  Goal: No skin breakdown during hospitalization  Outcome: Ongoing  Note: No new areas of skin break down noted. Problem: Infection - Central Venous Catheter-Associated Bloodstream Infection:  Goal: Will show no infection signs and symptoms  Description: Will show no infection signs and symptoms  Outcome: Ongoing  Note: PICC line remains in place with clean, dry dressing.       Problem: Urinary Elimination:  Goal: Signs and symptoms of infection will decrease  Description: Signs and symptoms of infection will decrease  Outcome: Ongoing  Note: Dimas remains in place and draining adequate amount of urine. Problem: Discharge Planning:  Goal: Discharged to appropriate level of care  Description: Discharged to appropriate level of care  Outcome: Ongoing     Care plan reviewed with patient. Patient unable to verbalize understanding of the plan of care or contribute to goal setting.

## 2020-10-28 NOTE — PROGRESS NOTES
TPN Follow Up Note    Assessment: remains npo, increase to 20 kcal/kg and protein to 1.5 g/kg per day per RD  Electrolyte Replacement: none    TPN changes for (today) at 1800:   Decrease K acetate to 80 mEq    Re-check BMP, Mg, PO4, iCa 10/29/20    Conor Walker, PharmD, BCPS   10/28/2020  12:04 PM

## 2020-10-28 NOTE — PROGRESS NOTES
Progress note: Infectious diseases    Patient - Luis Herrera,  Age - 44 y.o.    - 1970      Room Number - 4K-10/010-A   MRN -  [id number]   Acct # - [de-identified]  Date of Admission -  2020  9:02 AM    SUBJECTIVE:   He is more awake and appropriate  OBJECTIVE   VITALS    height is 5' 8\" (1.727 m) and weight is 287 lb 4.8 oz (130.3 kg). His axillary temperature is 98.6 °F (37 °C). His blood pressure is 141/95 (abnormal) and his pulse is 90. His respiration is 18 and oxygen saturation is 98%. Wt Readings from Last 3 Encounters:   10/28/20 287 lb 4.8 oz (130.3 kg)   09/15/20 281 lb 6.4 oz (127.6 kg)   20 (!) 306 lb 6.4 oz (139 kg)       I/O (24 Hours)    Intake/Output Summary (Last 24 hours) at 10/28/2020 1712  Last data filed at 10/28/2020 1439  Gross per 24 hour   Intake 4122.73 ml   Output 2600 ml   Net 1522.73 ml       General Appearance awake and oriented, but weak  HEENT - normocephalic, atraumatic, pale  conjunctiva,  anicteric sclera dry oral cavity  Neck - Supple, no mass  Lungs -  Bilateral   air entry, diminished breath sound  Cardiovascular - Heart sounds are normal.    Abdomen - soft, not distended, nontender,   Neurologic awake   Skin - No bruising or bleeding  Extremities - chronic leg edema, perianal open wound. Dimas in place.     MEDICATIONS:      pantoprazole  40 mg Intravenous BID    vancomycin  1,750 mg Intravenous Q24H    vancomycin (VANCOCIN) intermittent dosing (placeholder)   Other RX Placeholder    sodium chloride flush  10 mL Intravenous 2 times per day    ferrous sulfate  325 mg Oral BID WC    insulin lispro  0-12 Units Subcutaneous Q6H    piperacillin-tazobactam  3.375 g Intravenous Q8H    [Held by provider] gabapentin  400 mg Oral BID    [Held by provider] modafinil  100 mg Oral Daily    [Held by provider] enoxaparin  40 mg Subcutaneous BID    insulin glargine 38 Units Subcutaneous Nightly    lidocaine 1 % injection  5 mL Intradermal Once    magnesium replacement protocol   Other RX Placeholder    phosphorus replacement protocol   Other RX Placeholder    calcium replacement protocol   Other RX Placeholder    [Held by provider] ARIPiprazole  15 mg Oral Daily    [Held by provider] aspirin  81 mg Oral Daily    glycopyrrolate-formoterol  2 puff Inhalation BID      PN-Adult  3 IN 1 Central Line (Custom)      PN-Adult  3 IN 1 Central Line (Custom) Stopped (10/28/20 1638)    sodium chloride      dextrose       sodium chloride flush, hydrALAZINE, acetaminophen, potassium chloride **OR** potassium alternative oral replacement **OR** potassium chloride, potassium chloride, lidocaine, acetaminophen **OR** [DISCONTINUED] acetaminophen, polyethylene glycol, promethazine **OR** ondansetron, albuterol, glucose, dextrose, glucagon (rDNA), dextrose      LABS:     CBC:   Recent Labs     10/26/20  0620  10/27/20  0315  10/27/20  1850 10/27/20  2355 10/28/20  0920   WBC 9.4  --  10.9*  --   --   --  11.1*   HGB 6.6*   < > 7.2*   < > 6.8* 7.9* 7.9*     --  357  --   --   --  315    < > = values in this interval not displayed. BMP:    Recent Labs     10/26/20  0226 10/27/20  0315 10/28/20  0440    138 141   K 4.5 4.8 5.0    98 101   CO2 30 31 30   BUN 12 14 17   CREATININE 1.5* 1.5* 1.3*   GLUCOSE 137* 155* 164*     Calcium:  Recent Labs     10/28/20  0440   CALCIUM 9.6     Ionized Calcium:No results for input(s): IONCA in the last 72 hours. Magnesium:  Recent Labs     10/28/20  0440   MG 1.9     Phosphorus:  Recent Labs     10/28/20  0440   PHOS 3.9     BNP:No results for input(s): BNP in the last 72 hours. Glucose:  Recent Labs     10/27/20  2339 10/28/20  0549 10/28/20  1102   POCGLU 181* 160* 165*     HgbA1C: No results for input(s): LABA1C in the last 72 hours. INR:   No results for input(s): INR in the last 72 hours.   Hepatic:   Recent Labs 10/27/20  0315 10/28/20  0440   ALKPHOS 73 67   ALT 12 11   AST 14 13   PROT 6.1 6.0*   BILITOT 0.4 0.6   LABALBU 2.9* 3.0*        CULTURES:   UA: No results for input(s): SPECGRAV, PHUR, COLORU, CLARITYU, MUCUS, PROTEINU, BLOODU, RBCUA, WBCUA, BACTERIA, NITRU, GLUCOSEU, BILIRUBINUR, UROBILINOGEN, KETUA, LABCAST, LABCASTTY, AMORPHOS in the last 72 hours. Invalid input(s): CRYSTALS  Micro:   Lab Results   Component Value Date    BC No growth-preliminary  10/22/2020    BC  10/22/2020     possible contamination; clinical correlation required           Problem list of patient:     Patient Active Problem List   Diagnosis Code    Chronic diastolic congestive heart failure (HCC) I50.32    Atrial fibrillation ([facility]) I48.91    BPH (benign prostatic hyperplasia) N40.0    Carpal tunnel syndrome on right G56.01    Chronic gout M1A. 9XX0    DM2 (diabetes mellitus, type 2) ([facility] E11.9    Heart failure with preserved ejection fraction (HCC) I50.30    History of alcohol abuse F10.11    History of osteomyelitis Z87.39    Essential hypertension I10    Hypogonadism, male E29.1    Major depression F32.9    Morbid obesity ([facility]) E66.01    DURAN (nonalcoholic steatohepatitis) K75.81    KIARA (obstructive sleep apnea) G47.33    Vitamin D deficiency E55.9    Normocytic anemia D64.9    Physical deconditioning R53.81    Mood disorder ([facility]) F39    Hallucinations R44.3    GERD (gastroesophageal reflux disease) K21.9    Wound of buttock S31.809A    Chest wall pain with tenderness R07.89    Primary osteoarthritis of left hip M16.12    COVID-19 U07.1    Acute respiratory failure due to COVID-19 (HCC) U07.1, J96.00    Acute respiratory failure with hypoxia ([facility]) J96.01    ARF (acute renal failure) with tubular necrosis ([facility]) N17.0    Hypokalemia E87.6    Other hypotension I95.89    Pneumonia due to COVID-19 virus U07.1, J12.89    Sepsis due to COVID-19 (HCC) U07.1, A41.89    KRISTY (acute kidney injury) ([facility] N17.9    Hypernatremia E87.0    Acute encephalopathy G93.40    Acute on chronic anemia D64.9    Hypomagnesemia E83.42    Dysphagia R13.10    Diarrhea R19.7    At risk for malnutrition Z91.89    Reactive thrombocytosis R79.89         ASSESSMENT/PLAN   Respiratory failure following COVID -19 infection:  . Anemia:     Deconditioning  Ulceration of the rectum with intermittent bleed due to flexiseal    Obesity  CHF  Deconditioning. continue current treatment.      Benjamín Ledezma MD, Domenic Braga 10/28/2020 5:12 PM

## 2020-10-28 NOTE — CARE COORDINATION
Update: plan peer-peer review for LTACH today; collaborated with Attending yesterday  Electronically signed by Mariangel Gonzalez RN on 10/28/2020 at 7:50 AM  Update: insurance denied LTACH; option to do appeal; collaborated with Nona Bergeron, Attending  Electronically signed by Mariangel Gonzalez RN on 10/28/2020 at 11:13 AM  Update: [facility] Medicare Appeal:   PHONE--> [phone], option 2 (takes longer), FAX--> [phone] (fastest way to appeal); collaborated with Nona Bergeron, Attending  Electronically signed by Mariangel Gonzalez RN on 10/28/2020 at 11:59 AM

## 2020-10-28 NOTE — PROGRESS NOTES
[facility]         Renal Inpatient Follow-Up note         10/28/2020 8:04 AM    Pt Name:   Letha Yanes  YOB: 1970  Attending:   Papa Hess MD    Chief Complaint : Letha Yanes is a 44 y.o. male being followed by nephrology for acute kidney injury/electrolyte abnormalities    Interval History :   Patient seen and examined by me. No distress. Drowsy but opening eyes to speech .   Poor historian  On a BIPAP constantly  Failed speech eval and may be for a PEG at some point     Scheduled Medications :    pantoprazole  40 mg Intravenous BID    vancomycin  1,750 mg Intravenous Q24H    vancomycin (VANCOCIN) intermittent dosing (placeholder)   Other RX Placeholder    sodium chloride flush  10 mL Intravenous 2 times per day    ferrous sulfate  325 mg Oral BID WC    insulin lispro  0-12 Units Subcutaneous Q6H    piperacillin-tazobactam  3.375 g Intravenous Q8H    [Held by provider] gabapentin  400 mg Oral BID    [Held by provider] modafinil  100 mg Oral Daily    [Held by provider] enoxaparin  40 mg Subcutaneous BID    insulin glargine  38 Units Subcutaneous Nightly    lidocaine 1 % injection  5 mL Intradermal Once    magnesium replacement protocol   Other RX Placeholder    phosphorus replacement protocol   Other RX Placeholder    calcium replacement protocol   Other RX Placeholder    [Held by provider] ARIPiprazole  15 mg Oral Daily    [Held by provider] aspirin  81 mg Oral Daily    glycopyrrolate-formoterol  2 puff Inhalation BID      PN-Adult  3 IN 1 Central Line (Custom) 100 mL/hr at 10/27/20 1706    sodium chloride      dextrose         Vitals :  /82   Pulse 88   Temp 98.7 °F (37.1 °C) (Axillary)   Resp 18   Ht 5' 8\" (1.727 m)   Wt 287 lb 4.8 oz (130.3 kg)   SpO2 100%   BMI 43.68 kg/m²     24HR INTAKE/OUTPUT:      Intake/Output Summary (Last 24 hours) at 10/28/2020 0804  Last data filed at 10/28/2020 0335  Gross per 24 hour Intake 4047.08 ml   Output 2000 ml   Net 2047.08 ml     Last 3 weights  Wt Readings from Last 3 Encounters:   10/28/20 287 lb 4.8 oz (130.3 kg)   09/15/20 281 lb 6.4 oz (127.6 kg)   08/21/20 (!) 306 lb 6.4 oz (139 kg)           Physical Exam :  General Appearance: Obese well-nourished no distress  CNS- opens eyes to speech  Psych-not agitated  Lungs diminished, poor air entry  Abdomen: Soft non-tender  Musculoskeletal:  Edema -no edema           Last 3 CBC   Recent Labs     10/25/20  0840 10/26/20  0620  10/27/20  0315 10/27/20  1220 10/27/20  1850 10/27/20  2355   WBC 10.2 9.4  --  10.9*  --   --   --    RBC 2.58* 2.37*  --  2.51*  --   --   --    HGB 7.3* 6.6*   < > 7.2* 6.1* 6.8* 7.9*   HCT 24.5* 22.1*   < > 23.1* 19.9* 21.6* 24.7*    296  --  357  --   --   --     < > = values in this interval not displayed. Last 3 CMP  Recent Labs     10/25/20  0840 10/26/20  0226 10/27/20  0315 10/28/20  0440    141 138 141   K 4.4 4.5 4.8 5.0    104 98 101   CO2 28 30 31 30   BUN 14 12 14 17   CREATININE 1.4* 1.5* 1.5* 1.3*   CALCIUM 9.5 9.7 9.5 9.6   LABALBU 2.9*  --  2.9* 3.0*   BILITOT 0.5  --  0.4 0.6             ASSESSMENT / Plan   1 Renal -acute kidney injury most likely due to septic ATN/hypotension  ? Improving with some hydration with TPN  Currently at 1. 3  ? As needed diuretics    2 Electrolytes - doing well. Supplemented by TPN. arvin watch K level getting a lot of kcl in TPN. Consider decreasing the potassium, pharmacy adjusting it  3 Anemia- S/P post rectal clot. GI on board. Had an endoscopy done which showed rectal ulcer  4 Hypotension -appears to be resolved  5 Hx of diabetes mellitus  6 Hypercalcemia-improving follow for now no need for any bisphosphonate this is hypercalcemia of immobility. improved corrected calcium is only ~ 10.4  7 Pneumonia on abx - follow vanc levels arvin ID on board  8 Hx of COVID-19 pneumonia   9 Hx of diastolic dysfunction volume status reasonable closely follow. As needed diuretics  10 Meds reviewed      EMELY Chilel D.  [facility].

## 2020-10-29 LAB
ANION GAP SERPL CALCULATED.3IONS-SCNC: 9 MEQ/L (ref 8–16)
BUN BLDV-MCNC: 14 MG/DL (ref 7–22)
CALCIUM IONIZED: 1.24 MMOL/L (ref 1.12–1.32)
CALCIUM SERPL-MCNC: 9.8 MG/DL (ref 8.5–10.5)
CHLORIDE BLD-SCNC: 98 MEQ/L (ref 98–111)
CO2: 32 MEQ/L (ref 23–33)
CREAT SERPL-MCNC: 1.4 MG/DL (ref 0.4–1.2)
ERYTHROCYTE [DISTWIDTH] IN BLOOD BY AUTOMATED COUNT: 16.4 % (ref 11.5–14.5)
ERYTHROCYTE [DISTWIDTH] IN BLOOD BY AUTOMATED COUNT: 56.3 FL (ref 35–45)
GFR SERPL CREATININE-BSD FRML MDRD: 64 ML/MIN/1.73M2
GLUCOSE BLD-MCNC: 152 MG/DL (ref 70–108)
GLUCOSE BLD-MCNC: 152 MG/DL (ref 70–108)
GLUCOSE BLD-MCNC: 177 MG/DL (ref 70–108)
GLUCOSE BLD-MCNC: 181 MG/DL (ref 70–108)
GLUCOSE BLD-MCNC: 189 MG/DL (ref 70–108)
GLUCOSE BLD-MCNC: 190 MG/DL (ref 70–108)
GLUCOSE BLD-MCNC: 222 MG/DL (ref 70–108)
HCT VFR BLD CALC: 26.8 % (ref 42–52)
HEMOGLOBIN: 8.4 GM/DL (ref 14–18)
MAGNESIUM: 1.9 MG/DL (ref 1.6–2.4)
MCH RBC QN AUTO: 29.8 PG (ref 26–33)
MCHC RBC AUTO-ENTMCNC: 31.3 GM/DL (ref 32.2–35.5)
MCV RBC AUTO: 95 FL (ref 80–94)
PHOSPHORUS: 4 MG/DL (ref 2.4–4.7)
PLATELET # BLD: 365 THOU/MM3 (ref 130–400)
PMV BLD AUTO: 10.6 FL (ref 9.4–12.4)
POTASSIUM SERPL-SCNC: 5.1 MEQ/L (ref 3.5–5.2)
RBC # BLD: 2.82 MILL/MM3 (ref 4.7–6.1)
SODIUM BLD-SCNC: 139 MEQ/L (ref 135–145)
WBC # BLD: 11.2 THOU/MM3 (ref 4.8–10.8)

## 2020-10-29 PROCEDURE — 83735 ASSAY OF MAGNESIUM: CPT

## 2020-10-29 PROCEDURE — 84100 ASSAY OF PHOSPHORUS: CPT

## 2020-10-29 PROCEDURE — 94761 N-INVAS EAR/PLS OXIMETRY MLT: CPT

## 2020-10-29 PROCEDURE — 6370000000 HC RX 637 (ALT 250 FOR IP): Performed by: INTERNAL MEDICINE

## 2020-10-29 PROCEDURE — 82948 REAGENT STRIP/BLOOD GLUCOSE: CPT

## 2020-10-29 PROCEDURE — 2700000000 HC OXYGEN THERAPY PER DAY

## 2020-10-29 PROCEDURE — 94660 CPAP INITIATION&MGMT: CPT

## 2020-10-29 PROCEDURE — C9113 INJ PANTOPRAZOLE SODIUM, VIA: HCPCS | Performed by: FAMILY MEDICINE

## 2020-10-29 PROCEDURE — 82330 ASSAY OF CALCIUM: CPT

## 2020-10-29 PROCEDURE — 2580000003 HC RX 258: Performed by: STUDENT IN AN ORGANIZED HEALTH CARE EDUCATION/TRAINING PROGRAM

## 2020-10-29 PROCEDURE — 6360000002 HC RX W HCPCS: Performed by: STUDENT IN AN ORGANIZED HEALTH CARE EDUCATION/TRAINING PROGRAM

## 2020-10-29 PROCEDURE — 36415 COLL VENOUS BLD VENIPUNCTURE: CPT

## 2020-10-29 PROCEDURE — 85027 COMPLETE CBC AUTOMATED: CPT

## 2020-10-29 PROCEDURE — 94640 AIRWAY INHALATION TREATMENT: CPT

## 2020-10-29 PROCEDURE — 2580000003 HC RX 258: Performed by: FAMILY MEDICINE

## 2020-10-29 PROCEDURE — 6360000002 HC RX W HCPCS: Performed by: FAMILY MEDICINE

## 2020-10-29 PROCEDURE — 99233 SBSQ HOSP IP/OBS HIGH 50: CPT | Performed by: HOSPITALIST

## 2020-10-29 PROCEDURE — 99232 SBSQ HOSP IP/OBS MODERATE 35: CPT | Performed by: INTERNAL MEDICINE

## 2020-10-29 PROCEDURE — 80048 BASIC METABOLIC PNL TOTAL CA: CPT

## 2020-10-29 PROCEDURE — 2500000003 HC RX 250 WO HCPCS: Performed by: PHARMACIST

## 2020-10-29 PROCEDURE — 2060000000 HC ICU INTERMEDIATE R&B

## 2020-10-29 RX ADMIN — INSULIN LISPRO 2 UNITS: 100 INJECTION, SOLUTION INTRAVENOUS; SUBCUTANEOUS at 07:21

## 2020-10-29 RX ADMIN — INSULIN GLARGINE 38 UNITS: 100 INJECTION, SOLUTION SUBCUTANEOUS at 20:21

## 2020-10-29 RX ADMIN — PIPERACILLIN AND TAZOBACTAM 3.38 G: 3; .375 INJECTION, POWDER, LYOPHILIZED, FOR SOLUTION INTRAVENOUS at 03:39

## 2020-10-29 RX ADMIN — PIPERACILLIN AND TAZOBACTAM 3.38 G: 3; .375 INJECTION, POWDER, LYOPHILIZED, FOR SOLUTION INTRAVENOUS at 11:20

## 2020-10-29 RX ADMIN — GLYCOPYRROLATE AND FORMOTEROL FUMARATE 2 PUFF: 9; 4.8 AEROSOL, METERED RESPIRATORY (INHALATION) at 08:33

## 2020-10-29 RX ADMIN — SODIUM CHLORIDE, PRESERVATIVE FREE 10 ML: 5 INJECTION INTRAVENOUS at 20:22

## 2020-10-29 RX ADMIN — PANTOPRAZOLE SODIUM 40 MG: 40 INJECTION, POWDER, FOR SOLUTION INTRAVENOUS at 20:22

## 2020-10-29 RX ADMIN — SODIUM CHLORIDE: 234 INJECTION INTRAMUSCULAR; INTRAVENOUS; SUBCUTANEOUS at 17:37

## 2020-10-29 RX ADMIN — VANCOMYCIN HYDROCHLORIDE 1750 MG: 5 INJECTION, POWDER, LYOPHILIZED, FOR SOLUTION INTRAVENOUS at 11:36

## 2020-10-29 RX ADMIN — INSULIN LISPRO 2 UNITS: 100 INJECTION, SOLUTION INTRAVENOUS; SUBCUTANEOUS at 11:31

## 2020-10-29 RX ADMIN — PANTOPRAZOLE SODIUM 40 MG: 40 INJECTION, POWDER, FOR SOLUTION INTRAVENOUS at 09:18

## 2020-10-29 RX ADMIN — INSULIN LISPRO 4 UNITS: 100 INJECTION, SOLUTION INTRAVENOUS; SUBCUTANEOUS at 17:47

## 2020-10-29 RX ADMIN — GLYCOPYRROLATE AND FORMOTEROL FUMARATE 2 PUFF: 9; 4.8 AEROSOL, METERED RESPIRATORY (INHALATION) at 18:02

## 2020-10-29 RX ADMIN — PIPERACILLIN AND TAZOBACTAM 3.38 G: 3; .375 INJECTION, POWDER, LYOPHILIZED, FOR SOLUTION INTRAVENOUS at 19:28

## 2020-10-29 ASSESSMENT — PAIN SCALES - GENERAL
PAINLEVEL_OUTOF10: 0

## 2020-10-29 NOTE — PROGRESS NOTES
Progress note: Infectious diseases    Patient - Brenda Skaggs,  Age - 44 y.o.    - 1970      Room Number - 4K-10/010-A   MRN -  [id number]   Acct # - [de-identified]  Date of Admission -  2020  9:02 AM    SUBJECTIVE:   No new issues. OBJECTIVE   VITALS    height is 5' 8\" (1.727 m) and weight is 289 lb 8 oz (131.3 kg). His axillary temperature is 98.4 °F (36.9 °C). His blood pressure is 138/84 and his pulse is 84. His respiration is 17 and oxygen saturation is 98%. Wt Readings from Last 3 Encounters:   10/29/20 289 lb 8 oz (131.3 kg)   09/15/20 281 lb 6.4 oz (127.6 kg)   20 (!) 306 lb 6.4 oz (139 kg)       I/O (24 Hours)    Intake/Output Summary (Last 24 hours) at 10/29/2020 1714  Last data filed at 10/29/2020 1351  Gross per 24 hour   Intake 3284.63 ml   Output 3800 ml   Net -515.37 ml       General Appearance awake and oriented,    HEENT - normocephalic, atraumatic, pale  conjunctiva,  anicteric sclera    Neck - Supple, no mass  Lungs -  Bilateral   air entry, diminished breath sound  Cardiovascular - Heart sounds are normal.    Abdomen - soft, not distended, nontender,   Neurologic awake   Skin - No bruising or bleeding  Extremities - chronic leg edema, perianal open wound.        MEDICATIONS:      pantoprazole  40 mg Intravenous BID    vancomycin  1,750 mg Intravenous Q24H    vancomycin (VANCOCIN) intermittent dosing (placeholder)   Other RX Placeholder    sodium chloride flush  10 mL Intravenous 2 times per day    ferrous sulfate  325 mg Oral BID WC    insulin lispro  0-12 Units Subcutaneous Q6H    piperacillin-tazobactam  3.375 g Intravenous Q8H    [Held by provider] gabapentin  400 mg Oral BID    [Held by provider] modafinil  100 mg Oral Daily    [Held by provider] enoxaparin  40 mg Subcutaneous BID    insulin glargine  38 Units Subcutaneous Nightly    lidocaine 1 % injection  5 mL Intradermal Once    magnesium replacement protocol   Other RX Placeholder    phosphorus replacement protocol   Other RX Placeholder    calcium replacement protocol   Other RX Placeholder    [Held by provider] ARIPiprazole  15 mg Oral Daily    [Held by provider] aspirin  81 mg Oral Daily    glycopyrrolate-formoterol  2 puff Inhalation BID      PN-Adult  3 IN 1 Central Line (Custom)      PN-Adult  3 IN 1 Central Line (Custom) 100 mL/hr at 10/28/20 1739    sodium chloride      dextrose       sodium chloride flush, hydrALAZINE, acetaminophen, potassium chloride **OR** potassium alternative oral replacement **OR** potassium chloride, potassium chloride, lidocaine, acetaminophen **OR** [DISCONTINUED] acetaminophen, polyethylene glycol, promethazine **OR** ondansetron, albuterol, glucose, dextrose, glucagon (rDNA), dextrose      LABS:     CBC:   Recent Labs     10/27/20  0315  10/27/20  2355 10/28/20  0920 10/29/20  0400   WBC 10.9*  --   --  11.1* 11.2*   HGB 7.2*   < > 7.9* 7.9* 8.4*     --   --  315 365    < > = values in this interval not displayed. BMP:    Recent Labs     10/27/20  0315 10/28/20  0440 10/29/20  0400    141 139   K 4.8 5.0 5.1   CL 98 101 98   CO2 31 30 32   BUN 14 17 14   CREATININE 1.5* 1.3* 1.4*   GLUCOSE 155* 164* 152*     Calcium:  Recent Labs     10/29/20  0400   CALCIUM 9.8     Ionized Calcium:No results for input(s): IONCA in the last 72 hours. Magnesium:  Recent Labs     10/29/20  0400   MG 1.9     Phosphorus:  Recent Labs     10/29/20  0400   PHOS 4.0     BNP:No results for input(s): BNP in the last 72 hours. Glucose:  Recent Labs     10/28/20  2350 10/29/20  0600 10/29/20  1130   POCGLU 189* 152* 181*     HgbA1C: No results for input(s): LABA1C in the last 72 hours. INR:   No results for input(s): INR in the last 72 hours.   Hepatic:   Recent Labs     10/27/20  0315 10/28/20  0440   ALKPHOS 73 67   ALT 12 11   AST 14 13   PROT 6.1 6.0*   BILITOT 0.4 0.6   LABALBU 2. 9* 3.0*        CULTURES:   UA: No results for input(s): SPECGRAV, PHUR, COLORU, CLARITYU, MUCUS, PROTEINU, BLOODU, RBCUA, WBCUA, BACTERIA, NITRU, GLUCOSEU, BILIRUBINUR, UROBILINOGEN, KETUA, LABCAST, LABCASTTY, AMORPHOS in the last 72 hours. Invalid input(s): CRYSTALS  Micro:   Lab Results   Component Value Date    BC No growth-preliminary  10/28/2020          Problem list of patient:     Patient Active Problem List   Diagnosis Code    Chronic diastolic congestive heart failure (HCC) I50.32    Atrial fibrillation (HCC) I48.91    BPH (benign prostatic hyperplasia) N40.0    Carpal tunnel syndrome on right G56.01    Chronic gout M1A. 9XX0    DM2 (diabetes mellitus, type 2) ([facility] E11.9    Heart failure with preserved ejection fraction (HCC) I50.30    History of alcohol abuse F10.11    History of osteomyelitis Z87.39    Essential hypertension I10    Hypogonadism, male E29.1    Major depression F32.9    Morbid obesity ([facility]) E66.01    DURAN (nonalcoholic steatohepatitis) K75.81    KIARA (obstructive sleep apnea) G47.33    Vitamin D deficiency E55.9    Normocytic anemia D64.9    Physical deconditioning R53.81    Mood disorder ([facility]) F39    Hallucinations R44.3    GERD (gastroesophageal reflux disease) K21.9    Wound of buttock S31.809A    Chest wall pain with tenderness R07.89    Primary osteoarthritis of left hip M16.12    COVID-19 U07.1    COVID-19 virus infection U07.1    Acute respiratory failure with hypoxia ([facility]) J96.01    ARF (acute renal failure) with tubular necrosis ([facility]) N17.0    Hypokalemia E87.6    Other hypotension I95.89    Pneumonia due to COVID-19 virus U07.1, J12.89    Sepsis due to COVID-19 (HCC) U07.1, A41.89    KRISTY (acute kidney injury) ([facility] N17.9    Hypernatremia E87.0    Acute encephalopathy G93.40    Acute on chronic anemia D64.9    Hypomagnesemia E83.42    Dysphagia R13.10    Diarrhea R19.7    At risk for malnutrition Z91.89    Reactive thrombocytosis R79.89 ASSESSMENT/PLAN   Respiratory failure following COVID -19 infection:  Doing well  Anemia:     Deconditioning  Ulceration of the rectum with intermittent bleed due to flexiseal : better  Obesity  CHF  Continue current treatment.   Aileen Hurd MD, FACP 10/29/2020 5:14 PM

## 2020-10-29 NOTE — PROGRESS NOTES
[facility]         Renal Inpatient Follow-Up note         10/29/2020 9:22 AM    Pt Name:   Kenneth Bloom  YOB: 1970  Attending:   Baldemar Mcelroy MD    Chief Complaint : Kenneth Bloom is a 44 y.o. male being followed by nephrology for acute kidney injury/electrolyte abnormalities    Interval History :   Patient seen and examined by me. No distress. Drowsy but opening eyes to speech .   Poor historian  He appears more awake and alert  Decent UO and still on a TPN     Scheduled Medications :    pantoprazole  40 mg Intravenous BID    vancomycin  1,750 mg Intravenous Q24H    vancomycin (VANCOCIN) intermittent dosing (placeholder)   Other RX Placeholder    sodium chloride flush  10 mL Intravenous 2 times per day    ferrous sulfate  325 mg Oral BID WC    insulin lispro  0-12 Units Subcutaneous Q6H    piperacillin-tazobactam  3.375 g Intravenous Q8H    [Held by provider] gabapentin  400 mg Oral BID    [Held by provider] modafinil  100 mg Oral Daily    [Held by provider] enoxaparin  40 mg Subcutaneous BID    insulin glargine  38 Units Subcutaneous Nightly    lidocaine 1 % injection  5 mL Intradermal Once    magnesium replacement protocol   Other RX Placeholder    phosphorus replacement protocol   Other RX Placeholder    calcium replacement protocol   Other RX Placeholder    [Held by provider] ARIPiprazole  15 mg Oral Daily    [Held by provider] aspirin  81 mg Oral Daily    glycopyrrolate-formoterol  2 puff Inhalation BID      PN-Adult  3 IN 1 Central Line (Custom) 100 mL/hr at 10/28/20 1739    sodium chloride      dextrose         Vitals :  /86   Pulse 94   Temp 99.2 °F (37.3 °C) (Axillary)   Resp 16   Ht 5' 8\" (1.727 m)   Wt 289 lb 8 oz (131.3 kg)   SpO2 99%   BMI 44.02 kg/m²     24HR INTAKE/OUTPUT:      Intake/Output Summary (Last 24 hours) at 10/29/2020 0922  Last data filed at 10/29/2020 0426  Gross per 24 hour   Intake 2225.65 ml Output 3650 ml   Net -1424.35 ml     Last 3 weights  Wt Readings from Last 3 Encounters:   10/29/20 289 lb 8 oz (131.3 kg)   09/15/20 281 lb 6.4 oz (127.6 kg)   08/21/20 (!) 306 lb 6.4 oz (139 kg)           Physical Exam :  General Appearance: Obese well-nourished no distress  CNS- opens eyes to speech  Psych-not agitated  Lungs diminished, poor air entry  Abdomen: Soft non-tender  Musculoskeletal:  Edema -no edema           Last 3 CBC   Recent Labs     10/27/20  0315  10/27/20  2355 10/28/20  0920 10/29/20  0400   WBC 10.9*  --   --  11.1* 11.2*   RBC 2.51*  --   --  2.66* 2.82*   HGB 7.2*   < > 7.9* 7.9* 8.4*   HCT 23.1*   < > 24.7* 24.8* 26.8*     --   --  315 365    < > = values in this interval not displayed. Last 3 CMP  Recent Labs     10/27/20  0315 10/28/20  0440 10/29/20  0400    141 139   K 4.8 5.0 5.1   CL 98 101 98   CO2 31 30 32   BUN 14 17 14   CREATININE 1.5* 1.3* 1.4*   CALCIUM 9.5 9.6 9.8   LABALBU 2.9* 3.0*  --    BILITOT 0.4 0.6  --              ASSESSMENT / Plan   1 Renal -acute kidney injury most likely due to septic ATN/hypotension  ? Overall stable 1.3 - 1.5. probably new baseline  ? As needed diuretics. Decent UO    2 Electrolytes - doing well. Supplemented by TPN. arvin watch K level getting a lot of kcl in TPN. Pharmacy adjusting TPN  3 Anemia- S/P post rectal clot. GI on board. Had an endoscopy done which showed rectal ulcer  4 Hypotension -appears to be resolved  5 Hx of diabetes mellitus  6 Hypercalcemia-improving follow for now  hypercalcemia of immobility. improved corrected calcium is only ~ 10.4  7 Pneumonia on abx - follow vanc levels arvin ID on board. Last trough was 15.9 on 10/25/20  8 Hx of COVID-19 pneumonia   9 Hx of diastolic dysfunction volume status reasonable closely follow. As needed diuretics  10 Meds reviewed      EMELY Browne D.  [facility].

## 2020-10-29 NOTE — PROGRESS NOTES
Comprehensive Nutrition Assessment    Type and Reason for Visit:  Reassess    Nutrition Recommendations/Plan:   Recommend increase TPN kcals to 25 kcals/kg/day, continue 88 kg to dose, 30% lipid kcals, and 1.5 grams protein/kg/day. Diet per SLP and GI. Nutrition Assessment:    Pt improving from a nutritional standpoint AEB TPN being provided for nutrition support while NPO due to dysphagia and GI concerns. Remains at risk for further nutritional compromise r/t inability to place NGT due to anatomical reasons and behavioral issues, report that family declined PEG and trach if needed, catabolic illness, increased nutrient needs to support wound healing, lengthy hospitalization due to positive covid-19, acute respiratory failure, intubated-extubated 10/15/20, sepsis, pneumonia, KRISTY, anemia, GI bleed (large clot/ulceration at site of rectum), deconditioning, and underlying medical condition (history of obesity, DM, GERD, alcohol abuse, HLD, HTN, vitamin D deficiency), and need for nutrition support.  Nutrition recommendations/interventions as per above    Malnutrition Assessment:  Malnutrition Status: At risk for malnutrition (Comment)  Context:  Acute Illness     Findings of the 6 clinical characteristics of malnutrition:  Energy Intake:  Variable since admit, nutrition support off and on, hard to assess  Weight Loss:  Unable to assess(weight fluctuations with edema)     Body Fat Loss:  No significant body fat loss     Muscle Mass Loss:  No significant muscle mass loss    Fluid Accumulation:  (+1 and +2 edema) Extremities   Strength:  Not Performed    Estimated Daily Nutrient Needs:  Energy (kcal):  4517-1279 (30-32 kcals/kg IBW in active late phase); Weight Used for Energy Requirements:  Ideal(70 kg - ideal weight)     Protein (g):  ~140 gms (2/kgm IBW) as renal status allows; Weight Used for Protein Requirements:  Ideal(70 kg)          Nutrition Related Findings:  Recent COVID, no negative.   Pt seen, on bipap, alert, answered questions. Continues NPO per SLP recommendations also pt has been receiving units of blood recently. Note pt voiced they do not want PEG or Trach. TPN infusing via PICC at 100 ml/hr for nutrition support. Glucose 152, Creatinine 1.4, BUN 14, Magnesium 1.9, Phosphorus 4. Vanc, zosyn, iron, humalog, lantus. 2 BM in the last 24 hours. Wounds:  (stage II perineum, skin tear scrotum, stage II coccyx, DTI buttocks, stage II buttocks)       Current Nutrition Therapies:    Current Parenteral Nutrition Orders:  · Type and Formula: Tonight: 3-in-1 Custom(88 kg, 25 kcals/kg/day, 30% lipid kcals, 1.5 grams protein/kg/day)   · Lipids: Daily  · Duration: Continuous  · Rate/Volume: 100 ml/hr  · Current PN Order Provides: 88 kg to dose,  20 kcals/kg, 1.5 grams prorein & 30% lipid kcals to provide 1760 kcals, 132 grams protein, 207 grams CHO & 53 grams fat/24 hours  · Goal PN Orders Provides: Next bag~2200 kcals, 132 grams protein, 73 grams fat, 298 grams CHO per 24 hours      Anthropometric Measures:  · Height: 5' 8\" (172.7 cm)  · Current Body Weight: 289 lb 8 oz (131.3 kg)(10/29/20 +1 UE, +2 LE edema)   · Admission Body Weight: 285 lb (129.3 kg)(9/23/20, stated, +1 BLE and BUE edema)    · Usual Body Weight: 306 lb (138.8 kg)(EMR, 6/29/20, actual.  299# 8/15/20, [facility].)     · Ideal Body Weight: 154 lbs;   · BMI: 47.1  · Adjusted Body Weight:  ; (193# (88 kg) adjusted for TPN)   · BMI Categories: Obese Class 3 (BMI 40.0 or greater)       Nutrition Diagnosis:   · Inadequate oral intake related to cognitive or neurological impairment, swallowing difficulty as evidenced by NPO or clear liquid status due to medical condition, nutrition support - parenteral nutrition      Nutrition Interventions:   Food and/or Nutrient Delivery:  Continue NPO, Modify Parenteral Nutrition  Nutrition Education/Counseling:  No recommendation at this time   Coordination of Nutrition Care:  Continued Inpatient Monitoring    Goals:  Patient will receive PN to meet 75% or more of estimated nutrient needs until able to initiate EN or oral diet. Nutrition Monitoring and Evaluation:   Behavioral-Environmental Outcomes:  (n/a)   Food/Nutrient Intake Outcomes:  Diet Advancement/Tolerance, Parenteral Nutrition Intake/Tolerance  Physical Signs/Symptoms Outcomes:  Biochemical Data, Chewing or Swallowing, GI Status, Fluid Status or Edema, Nutrition Focused Physical Findings, Skin, Weight     Discharge Planning:     Too soon to determine     Electronically signed by Una Elias, RD, LD on 10/29/20 at 10:31 AM EDT    Contact: [phone]

## 2020-10-29 NOTE — PLAN OF CARE
infection will decrease  Description: Signs and symptoms of infection will decrease  Outcome: Ongoing  Note: Dimas remains in place. Problem: Infection - Central Venous Catheter-Associated Bloodstream Infection:  Goal: Will show no infection signs and symptoms  Description: Will show no infection signs and symptoms  Outcome: Ongoing  Note: Monitor s/s of infection      Problem: Impaired respiratory status  Goal: Clear lung sounds  Outcome: Ongoing  Note: Continuous bipap, abnormal lung sounds, de-stat when off Bipap, SOB on exertion and at rest    Care plan reviewed with patient and father. Patient and father verbalize understanding of the plan of care and contribute to goal setting.

## 2020-10-29 NOTE — PROGRESS NOTES
Hospitalist Progress Note      Patient:  Timo Gracia    Unit/Bed:4K-10/010-A  YOB: 1970  MRN: [id number]   Acct: [de-identified]     PCP: Corby Manzanares MD  Date of Admission: 9/23/2020     Date of Service: Pt seen/examined on 10/29/20  and Admitted to Inpatient with expected LOS greater than two midnights due to medical therapy. Chief Complaint:  Shortness of Breath    Assessment and Plan:    1.) Sepsis due to Covid Pneumonia and Bacteremia: Initially resolved, now having intermittent fever again and today leukocytosis is worsening, WBC 15.1 from 11 yesterday. Complicated medical course. Recent discharge from [facility] for COVID-19 on 9/15/2020. Readmitted for COVID-19 pneumonia on 9/23/2020, intubation on 9/23/2020 and 10/6/2020. Successfully weaned to room air currently. Per ICU notes patient would be a good candidate for Zyvox, however due to patient's inability to swallow he has been unable to receive Zyvox. 10/23: Has been treated with vancomycin, doxycycline (10/6-10/14), Zosyn (10/6-10/16) throughout his hospital stay. Likely MRSA pneumonia. -Previously had been afebrile however patient febrile to 100.4 on 10/21/2020. Febrile with low grade at 100.0 rectally 10/22/2020. Patient with residual fever morning of 10/23/2020 up to 103.0 °F      -Patient noted to be septic again with fever, tachycardia, and tachypnea. -ID has been consulted. Possible source of infection including recurrent pneumonia, rectal wound, PICC line. -IV Zosyn restarted, ID recommending 5-7 day course. We will add IV vancomycin with pharmacy to dose due to recurrent fevers. -Repeat blood cultures are pending      -Chest x-ray showing worsening infiltrates.        -Will order UA and urine culture, pt is on mason cath for urinary retention, last changed was 10/20 per RN      -Patient unable to perform repeat FEES exam due to decompensation    -Unable to place NG tube previously. If PICC line removed patient may not be able to receive nutrition. Patient is at high risk for decompensation. 10/24: Following ID recommendations, Continue Vanc/Zosyn    - TPN started, IVF 1/2 NS at 60 ml/hr    - Blood Culture (+) for Gram Positive cocci in clusters    - Common Respiratory Panel negative    - Lactic Acid normal, ProCal 4.58     10/26: Following ID recommendations, Continue Vanc/Zosyn    - Strep Pneumonia and Legionella Antibody negative    - Trend CBC daily     10/27: Following ID recommendations, Continue Vanc/Zosyn    - Trend CBC daily    - WBC 10.9     10/28: Following ID, Vanco/Zosyn to continue for 1 week more    - Trend CBC    - WBC 11.1     10/29: WBC 11.2, has been stable    - Following with ID    2.) Acute hypoxic respiratory failure due to COVID-19 pneumonia:  Intubated on 10/6/2020, extubated on 10/15/2020.    10/23: Currently saturating well on room air, however patient became more tachypneic      10/24: Patient on 2L NC      10/26: Still requiring BiPAP continuously       10/27: On BiPAP continuously, would benefit from trach, however the father declines at this time. 10/28: On BiPAP continuously. Will try breaks if Saturation remains stable. 10/29: Continue to try breaks with BiPAP if Saturation tolerates    3.) KRISTY due to hypotension (Resolved):  Creatinine 1.1 on admission. Trended up to 4.1 on 10/7/2020. Baseline creatinine 0.6 to 0.8. Nephrology following. Appreciate nephrology input.     10/23: Creatinine stable at 1.7 today    - Nephrology recommending continuing IV hydration      10/24: Cr 1.5 today, trending down    - Nephrology switched to 1/2 NS at 60 ml/hr      10/26: Cr 1.5, Stable, Nephrology states this may be new baseline    - As needed diuretics    - IVF to prevent Hypotension     10/27: Cr 1.5, Stable, likely new baseline    - As needed diuretics    - IVF to prevent Hypotension     10/28: Cr 1.3, Slight decrease    - Following with Nephrology recommendations    - Diuretics as needed, IVF for Hypotension     10/29: Cr 1.4, Stable    - Following Nephrology recomendations    4.) Hypernatremia, likely due to dehydration (Resolved): Off D5W, Initially resolved, now sodium 146 on 10/23.    10/24: Continue 1/2 NS as above per Nephrology    - Recheck BMP in am      10/26: Continue IVF as above    - Recheck IVF     10/27: Sodium 138    - Continue IVF as above    - Recheck BMP in AM     10/28: Sodium 141    - Continue IVF    - TPN adjusted by Dietary to ensure no overload of electrolytes. 10/29: Sodium 139, Continue as above    5.) Acute encephalopathy, etiology unclear, likely related to recent Covid 19 infection: 10/23 saw Worsening mentation today with fevers. Patient likely septic again due to unknown etiology. Infectious causes include rectal wound, pneumonia, PICC line infection. CT head on 10/20/2020 unremarkable. 10/24: Continue to track mentation     10/26: Mentation improving     10/27: Able to hold some conversation, unknown baseline     10/28: Mention seems to be improving, but still unable to hold a meaningful conversation. 10/29: Patient does not seem clinically improved from yesterday. 6.) Acute on chronic Normocytic anemia due to acute rectal bleeding- secondary to gluteal/rectal ulcerations vs hemodilutional from IVF, (Improving): Hemoglobin of 8.6 today. Baseline Hgb 7-8 in 10/2020, was around 11 in 9/2020. S/p 2 units PRBC. Blood clot seen by nursing staff today likely secondary to rectal ulcer. Patient does have rectal bleed from rectal ulcer from Flexi-Seal.        10/23: Continue to monitor hemoglobin      - Transfuse for hemoglobin less than 7      - GI on-board. Sign off due to unlikely GI bleed and rectal bleeding likely secondary to ulceration. 10/24: Continue to Monitor bleeding.   If rate worsens, consider checking Fibrinogen and PLTs for possible coagulopathy      - Transfuse if <7      - check H-H at 1400, CBC in am          10/26: Received 1 Unit PRBC      - Recheck CBC and H&H      - Monitor Bleeding from rectum       - GI states supportive care and good nutrition as treatment      - Transfuse if HgB <7          10/27: Receive 1 unit of PRBC in afternoon      - Recheck CBC and H&H      - Monitor for more passing clots, supportive care at this time      - Transfuse if HgB <7          10/28: Received 1 unit PRBC in early morning      - Recheck CBC and H&H, (recheck showed HgB 7.9, stable)      - No clots reported overnight      - Transfuse if HgB <7          10/29: No transfusion needed overnight      - Continue to Monitor, Transfuse if <7     7.) Hypokalemia due to hypomagnesemia and diarrhea, (Resolved): Replace per protocol, BMP in am .     8.) Hypomagnesemia likely due to poor oral intake and diarrhea, (Resolving): Replace per protocol, check magnesium level in am.     9.) Dysphagia, At risk for Malnutrition: Patient failed Fiberoptic Endoscopic Evaluation of the Swallow (FEES). ST recommend NPO, per ST NGT not an option right now due to failed FEES. Started TPN temporarily on 10/21.  plan to repeat FEES on Friday 10/23. However due to patient's fevers and decompensation unable to perform. 10/24: TPN in use, follow Dietary recommendations for nutrition and Free Water           10/27: Patient not a good candidate for PEG unless off of BiPAP      - Patient's father does not want a tracheostomy performed at this time     10.) Diarrhea: C. diff test ordered by GI, but not done. May repeat if continue diarrhea.     11.) Sarahi-rectal lesion: Wound ostomy on-board, appreciate input. Cont zinc oxide as ordered.      12. ) Hx of KIARA: Noncompliant with CPAP at home, Pulmonology consulted, using BiPAP. Settings: PEEP 6cm H2O              FiO2 30%              O2 flow rate 2 L/min     13.)  Diabetes mellitus type 2:  Blood sugars within goal range of 140-180.   Continue Lantus at current dose and sliding scale insulin. Accu-Cheks q. 4 and at bedtime, Hypoglycemia protocol in place     14.)  Essential hypertension:  Uncontrolled today due to pt not receiving PO meds ( on amlodipine, cardizem and hydralazine PO at home). IV hydralazine prn ordered. Avoid hypotension with recent sepsis and renal failure.     15.) Physical Deconditioning:  Patient likely will need SNF versus LTAC at discharge. [place] evaluation consult ordered. Palliative care following in discussed case with father who primary decision-maker. Code Status discussion should be made. To be kept Full Code for now.     16.) Thrombocytosis, likely reactive due to recent COVID-19 (Resolved): , recheck with CBC in am     17.) Mild hypercalcemia, likely from dehydration (Resolved): Ca 9.0, Continue IVF per Nephrology. Recheck with daily BMP.     18.) Urinary retention: On mason cath, check UA and urine culture. 10/24:  No Results back on Urine Culture    Disposition Plan: TBD based on clinical course. Trying to get approval for [place]. History Of Present Illness:      Mr. Juan A Blevins is a  44year-old morbidly obese black male lifetime non-smoker. Prachi Briseno has a history of morbid obesity associated with type 2 diabetes mellitus, prior alcohol abuse, hyperlipidemia, hypertension, hypogonadism, nonalcoholic steatohepatitis, obstructive sleep apnea, and gout.  Patient was hospitalized 9/6/2020 through 9/15/2020 with hypoxemic respiratory failure secondary to COVID-19 associated with diffuse bilateral infiltrates.  At that time, patient received Decadron, remdesivir, and danazol.  During hospitalization, he had issues with atrial fibrillation.  His insulin therapy required adjustment.  He was discharged home on subcutaneous Lovenox. Patient returned back to the emergency room on 9/23/2020 with increasing SOB and progressive hypoxia. CXR showed diffuse infiltrates.  Deteriorated and required intubation.  Patient underwent bronchoscopy on 9/27/2020 which demonstrated no mucus production. Patient extubated 10/2/2020. Patient reintubated for progressive respiratory failure with progressive obtundation on 10/6/2020.  This was associated with acute oliguric renal failure.  Patient was intubated 10/6/2020, and underwent volume resuscitation.  Fractional excretion of sodium returned less than 1 which was consistent with prerenal azotemia.  After volume resuscitation, patient demonstrated that he was hemoconcentrated with a drop of 2 g in the hemoglobin. With volume resuscitation, urine output did improve. After patient was intubated on 10/6/2020, he underwent pulmonary lavage which showed MRSA on the PCR but no other organism.  However, patient was found to have greater than 200 white blood cells in the urine.  Patient was treated with Zosyn and doxycycline. Previously, patient had received vancomycin and developed fever while on vancomycin. Therefore vancomycin was not continued.  Sputum subsequently grew staph aureus.  Zosyn was discontinued but doxycycline continued on 10/14/2020. Patient did well with spontaneous breathing trial and was extubated 10/15/2020.     10/21: Weaned to room air. Respiratory status remained stable. Spiked fever this morning at 100.4 °F.  Possibly due to rectal wound due to Flexi-Seal.  Restarted on IV Zosyn, ID reconsulted. Patient would be a good candidate for SNF versus LTACH at discharge     10/22: Doing well on room air today. Afebrile. Continue Zosyn for 5 to 7 days. [facility] evaluation. Continue zinc oxide for rectal wound. Blood pressure is elevated today, IV hydralazine for BP greater than 160     10/23: Recurrent fevers up to 103.0 °F today. Lactic acid ordered and normal.  Restarted vancomycin per ID. Possible PICC line infection versus worsening pneumonia. IV fluid bolus given. Patient tachypneic and tachycardic this morning, improved post fluid administration. 10/24: Patient lethargic and only responds to name. He is unable to stay awake for a complete exam.  He was placed on BiPAP overnight. He did not have a fever overnight. Patient was restarted on Vancomycin yesterday per ID. Patient had mild tachypnea early in the night, however this resolved by morning. The Patient was normocardic throughout the night. Following ID for advice and recommendations. 10/26: Patient more awake today than previous. He is able to answer questions, however limited due to constant use of BiPAP. He was placed in ICU yesterday due to continued blood in his stools as well as   Hypotension. He required 1 unite PRBC this morning. He was not intubated overnight. A sigmoidoscopy was preformed and showed a large perianal ulcer extending into the anal canal.  GI stated there was nothing they could do to prevent re-bleeding at this point. 10/27: Patient able to answer questions better this morning, however he is still tired/lethargic and cannot stay awake for the entire exam.  He denies chest pain. He points to his mask and states that it is painful and itchy. The Night time nurse reports that he had 3 episodes of passing large clots per his rectum. He has been grabbing at the BiPAP mask and has been succesful in taking it off occasionally, but he desatruates and needs it placed back on. He hit is toe against the bedrail and ripped off his right toenail. The nurse kept it in a sample cup. His HgB is at 7.2 (From 7.6 the night prior). Will recheck HgB later today and transfuse if necsessary. GI states only conservative management can be done for the perianal ulcer at this time. They also state that the patient is not a good candidate for PEG tube placement unless he is off of BiPAP. The patient's father does not want to place a tracheostomy at this time. Patient till on TPN. 10/28: Patient able to answer some questions today.   Still lethargic on exam.  He is still removing his BiPAP frequently, although his saturation drops below 90 when it is not on. There were no reported episodes of clots passed overnight. He had a BM that was mostly green in consistency. He required 1 unit of PRBC overnight, HgB currently stable at 7.9. Pre Cert was denied for [facility], starting appeal process. Antibiotics to continue for another week. Nothing else can be done at this time except transfusions and antibiotics. POA states no trach, which means that he will be unable to get a PEG tube. 10/29: Patient asleep for most of exam.  No episodes of Rectal Bleeding seen. Nurses state he is still trying to take the BiPAP off, but quickly desaturates. No units of PRBC had to be transfused. HgB increased to 8.4. Day 2 of appeal for [facility]. No current change in treatment plans. Past Medical History, Past Surgical History, Allergies, Medications, Social History, Family History reviewed in H&P and remain unchanged from admission. Diet:  Diet NPO Time Specified  PN-Adult  3 IN 1 Central Line (Custom)  PN-Adult  3 IN 1 Central Line (Custom)    Review of Systems:      Patient slept through exam, and even on awakening to his name, he fell back asleep before being able to answer questions. Physical exam:    /67   Pulse 98   Temp 97.7 °F (36.5 °C) (Axillary)   Resp 20   Ht 5' 8\" (1.727 m)   Wt 289 lb 8 oz (131.3 kg)   SpO2 99%   BMI 44.02 kg/m²     General appearance: On BiPAP, laying in bed, Able to answer questions appropriately, Obese  HEENT:  Normal cephalic, atraumatic without obvious deformity. Pupils equal, round, and reactive to light. Conjunctivae/corneas clear. Neck: Supple, with full range of motion. No jugular venous distention. Trachea midline. Respiratory:  On BiPAP, increased respiratory effort, coarse breath sounds, difficult to hear beyond noise from BiPAP machine.   Cardiovascular:  Regular Rate and rhythm with normal S1/S2 without murmurs, rubs or gallops. Abdomen: Soft, non-tender, non-distended with normal bowel sounds. Musculoskeletal:  No clubbing or cyanosis, edema in lower limbs bilaterally. Full range of motion without deformity. Skin: Skin color, texture, turgor normal.  No rashes or lesions. Dry skin around borders of face  Neurologic:  Limited due to inability to follow commands/stay awake  Capillary Refill: Brisk,< 3 seconds   Peripheral Pulses: +2 palpable, equal bilaterally       Labs:     Recent Labs     10/27/20  0315  10/27/20  2355 10/28/20  0920 10/29/20  0400   WBC 10.9*  --   --  11.1* 11.2*   HGB 7.2*   < > 7.9* 7.9* 8.4*   HCT 23.1*   < > 24.7* 24.8* 26.8*     --   --  315 365    < > = values in this interval not displayed. Recent Labs     10/27/20  0315 10/27/20  1000 10/28/20  0440 10/29/20  0400     --  141 139   K 4.8  --  5.0 5.1   CL 98  --  101 98   CO2 31  --  30 32   BUN 14  --  17 14   CREATININE 1.5*  --  1.3* 1.4*   CALCIUM 9.5  --  9.6 9.8   PHOS  --  3.6 3.9 4.0     Recent Labs     10/27/20  0315 10/28/20  0440   AST 14 13   ALT 12 11   BILITOT 0.4 0.6   ALKPHOS 73 67     No results for input(s): INR in the last 72 hours. No results for input(s): Grant Clap in the last 72 hours. Urinalysis:      Lab Results   Component Value Date    NITRU NEGATIVE 10/06/2020    WBCUA > 200 10/06/2020    BACTERIA FEW 10/06/2020    RBCUA 5-10 10/06/2020    BLOODU LARGE 10/06/2020    SPECGRAV >=1.030 10/06/2020    GLUCOSEU NEGATIVE 08/05/2020       Intake & Output:  I/O last 3 completed shifts: In: 2225.7 [I.V.:980.5; IV Piggyback:321.6]  Out: 9306 [Urine:3650]  No intake/output data recorded. Radiology:     CXR 10/23:  I have reviewed the CXR with the following interpretation: Poor inflation of lungs, borderline heart size, no effusions, PICC line right with catheter tip in cavoatrial junction, mild infiltrate scattering in both lungs, worsened appearance from prior study    XR CHEST PORTABLE   Final Result   1. Poor inflation lungs. Borderline heart size. No effusion. 2. Right arm PICC line, catheter tip the cavoatrial junction. 3. Mild infiltrate scattered in both lungs and left infrahilar region. 4. Overall appearance slightly worsened from prior study. **This report has been created using voice recognition software. It may contain minor errors which are inherent in voice recognition technology. **      Final report electronically signed by Dr. Yanet Parr on 10/23/2020 10:46 AM      CT ABDOMEN PELVIS WO CONTRAST Additional Contrast? None   Final Result   1. Almost complete resolution of previously seen airspace infiltrates in the lung bases. 2. No acute abdominal or pelvic abnormalities. **This report has been created using voice recognition software. It may contain minor errors which are inherent in voice recognition technology. **      Final report electronically signed by Dr. Jeet Jovel on 10/20/2020 10:50 AM      CT HEAD WO CONTRAST   Final Result    No evidence of acute intracranial abnormality. **This report has been created using voice recognition software. It may contain minor errors which are inherent in voice recognition technology. **      Final report electronically signed by Dr. Duke Sue MD on 10/20/2020 10:13 AM      XR CHEST PORTABLE   Final Result   Ill-defined bilateral lung opacities. Follow-up as clinically indicated. This document has been electronically signed by: Carlos Wynn MD on 10/20/2020 05:38 AM         XR CHEST PORTABLE   Final Result   Minimal residual atelectasis and/or infiltrate within the bilateral mid    and lower lungs with improvement. Improved pulmonary inflation. This document has been electronically signed by: Cayetano Case MD on    10/16/2020 02:02 AM         XR CHEST PORTABLE   Final Result   Impression:   Progressive bilateral consolidations or ARDS.       This document has been similar to the previous examination however may be accentuated by the expiratory technique. There is no pleural effusion. Follow-up chest radiographs    are recommended to confirm complete resolution. **This report has been created using voice recognition software. It may contain minor errors which are inherent in voice recognition technology. **      Final report electronically signed by Dr. Nestor Butler on 10/6/2020 7:18 AM      XR CHEST PORTABLE   Final Result   Bilateral lung opacities. Follow-up to clearing recommended. This document has been electronically signed by: Niko Adams MD on 10/06/2020 06:09 AM         XR CHEST PORTABLE   Final Result   Slightly decreased diffuse patchy bilateral airspace disease. Support devices as above. This document has been electronically signed by: Connie Marques MD on    10/03/2020 05:15 AM         XR CHEST PORTABLE   Final Result   ET tube should be retracted 1.5-2.0 cm. No significant change in coarse scattered bilateral infiltrates. This document has been electronically signed by: Siddharth Baker MD on    09/30/2020 06:50 AM         XR CHEST PORTABLE   Final Result      Slightly improving bilateral pneumonia. **This report has been created using voice recognition software. It may contain minor errors which are inherent in voice recognition technology. **      Final report electronically signed by Dr. Bensno Davis on 9/27/2020 2:23 PM      XR ABDOMEN FOR NG/OG/NE TUBE PLACEMENT   Final Result   Esophageal route tube tip in the stomach. **This report has been created using voice recognition software. It may contain minor errors which are inherent in voice recognition technology. **      Final report electronically signed by Dr Yumi Castaneda on 9/26/2020 10:22 AM      XR CHEST PORTABLE   Final Result   1. Lines and tubes as above. 2. Worsening bilateral pneumonia.                **This report has been created using voice recognition software. It may contain minor errors which are inherent in voice recognition technology. **      Final report electronically signed by Dr. Berenice Irby on 9/24/2020 7:38 AM      XR CHEST PORTABLE   Final Result   1. Endotracheal tube terminates 3.1 cm above the madi. 2.  Orogastric tube in the stomach. 3.  Patchy opacities in the right upper lobe and lingula. This document has been electronically signed by: Horacio Haile MD on    09/24/2020 12:35 AM         XR CHEST PORTABLE   Final Result   1. Bilateral pulmonary opacification worse than on previous study dated 10 September 2020. This may represent worsening inflammatory process, possibly Covid 19 infection. Please correlate clinically   2. Borderline cardiomegaly. **This report has been created using voice recognition software. It may contain minor errors which are inherent in voice recognition technology. **      Final report electronically signed by DR Micah Hogue on 9/23/2020 10:47 AM           DVT prophylaxis: Lovenox, held for GI bleed, initiate SCDs, May consider adding in Lovenox again.     Code Status: Limited    PT/OT Eval Status: Not Consulted    Active Hospital Problems    Diagnosis Date Noted    Sepsis due to COVID-19 ([address] [U07.1, A41.89]     KRISTY (acute kidney injury) ([address] [N17.9]     Hypernatremia [E87.0]     Acute encephalopathy [G93.40]     Acute on chronic anemia [D64.9]     Hypomagnesemia [E83.42]     Dysphagia [R13.10]     Diarrhea [R19.7]     At risk for malnutrition [Z91.89]     Reactive thrombocytosis [R79.89]     Pneumonia due to COVID-19 virus [U07.1, J12.89] 10/18/2020    ARF (acute renal failure) with tubular necrosis (HCC) [N17.0]     Hypokalemia [E87.6]     Other hypotension [I95.89]     Acute respiratory failure with hypoxia ([address] [J96.01]     COVID-19 virus infection [U07.1] 09/06/2020    Essential hypertension [I10]        Thank you Arlene Jalloh MD for the opportunity to be involved in this patient's care.     Electronically signed by Amauri Mcclendon DO on 10/29/2020 at 2:02 PM

## 2020-10-29 NOTE — PLAN OF CARE
Ongoing  Note: Alert only to person, responds to commands  10/28/2020 1956 by Bobby Steele RN  Outcome: Ongoing  Note: Patient alert to person only, re orient as needed      Problem: Cardiovascular  Goal: No DVT, peripheral vascular complications  Outcome: Ongoing  Note: SCD on patient, no s/s dvt at this time  Goal: Hemodynamic stability  10/28/2020 2253 by Pierre Lowe RN  Outcome: Ongoing  Note:   Vitals:    10/28/20 1548 10/28/20 1810 10/28/20 1945 10/28/20 2107   BP: (!) 141/95  (!) 166/104    Pulse: 90  91    Resp: 24 17 18 14   Temp: 98.6 °F (37 °C)  98.9 °F (37.2 °C)    TempSrc: Axillary  Axillary    SpO2: 98%  98%    Weight:       Height:           10/28/2020 1956 by Bobby Steele RN  Outcome: Ongoing  Note: Monitor labs and vital signs      Problem: Respiratory  Goal: No pulmonary complications  Outcome: Ongoing  Note: Lungs diminished throughout  Goal: O2 Sat > 90%  10/28/2020 2253 by Pierre Lowe RN  Outcome: Ongoing  Note: O2 > 90 on BIPAP FiO2 30%  10/28/2020 1956 by Bobby Steele RN  Outcome: Ongoing  Note: Abnormal lung sounds, continuous Bipap, continuous pulse ox, SOB on exertion and at rest.     Problem: GI  Goal: No bowel complications  34/95/4917 2253 by Pierre Lowe RN  Outcome: Ongoing  Note: Frequent diarrhea, no bloody BM this shift so far  10/28/2020 1956 by Bobby Steele RN  Outcome: Ongoing  Note: No s/s of GI bleeding today, monitor stools     Problem: Skin Integrity/Risk  Goal: No skin breakdown during hospitalization  10/28/2020 2253 by Pierre Lowe RN  Outcome: Ongoing  Note: Q2 turn and incontinence care, zinc oxide applied  10/28/2020 1956 by Bobby Steele RN  Outcome: Ongoing  Note: Multiple skin breakdown, stage 2,skin tears, DTI, abrasion, excoriation, generalize weakness, q2hr turns, incontinent of stool, heels and elbows off bed.       Problem: Infection - Central Venous Catheter-Associated Bloodstream Infection:  Goal: Will show no infection signs and symptoms  Description: Will show no infection signs and symptoms  10/28/2020 2253 by Emily Negro RN  Outcome: Ongoing  Note: Alcohol caps in use, chlorhexidine bath daily  10/28/2020 1956 by Ruchi Crenshaw RN  Outcome: Ongoing  Note: Monitor s/s of infection      Problem: Urinary Elimination:  Goal: Signs and symptoms of infection will decrease  Description: Signs and symptoms of infection will decrease  10/28/2020 2253 by Emily Negro RN  Outcome: Ongoing  Note: Mild redness at site, mason care Q shfit    Problem: Discharge Planning:  Goal: Discharged to appropriate level of care  Description: Discharged to appropriate level of care  10/28/2020 2253 by Emily Negro RN  Outcome: Ongoing  Note: Patient from home, discharge planning in process, [place] refusal  10/28/2020 1956 by Ruchi Crenshaw, RN  Outcome: Ongoing  Note: [place] decline, SW following up

## 2020-10-29 NOTE — PLAN OF CARE
Problem: Nutrition  Goal: Optimal nutrition therapy  10/29/2020 1030 by Willie Rodriguez RD, LD  Outcome: Ongoing   Nutrition Problem #1: Inadequate oral intake  Intervention: Food and/or Nutrient Delivery: Continue NPO, Modify Parenteral Nutrition  Nutritional Goals: Patient will receive PN to meet 75% or more of estimated nutrient needs until able to initiate EN or oral diet.

## 2020-10-29 NOTE — CARE COORDINATION
Update: Faye Appeal day 1 (not yesterday, reason unknown); collaborated with Lorie Mejias, [id number]  Electronically signed by Andre Lee RN on 10/29/2020 at 3:20 PM

## 2020-10-29 NOTE — PLAN OF CARE
Problem: Impaired respiratory status  Goal: Patient will achieve/maintain normal respiratory rate/effort  10/29/2020 0834 by Candnicolás Level, RCP  Outcome: Ongoing     Problem: Impaired respiratory status  Goal: Patient will achieve/maintain normal respiratory rate/effort  10/29/2020 0834 by Candnicolás Level, RCP  Outcome: Ongoing     Problem: Impaired respiratory status  Goal: Clear lung sounds  10/29/2020 0834 by Sheree Level, RCP  Outcome: Ongoing

## 2020-10-29 NOTE — ADT AUTH CERT
Utilization Reviews         Pneumonia - Care Day 36 (10/28/2020) by Kell De Santiago RN         Review Status  Review Entered    Completed  10/29/2020 14:38        Criteria Review       Care Day: 36 Care Date: 10/28/2020 Level of Care: Intermediate Care    Guideline Day 3    Level Of Care    ( ) Floor to discharge [F]    10/29/2020 2:38 PM EDT by Sinai Encinas remains on pcu on 10-28    Clinical Status    (X) * Hemodynamic stability    10/29/2020 2:38 PM EDT by Frank Brunner      141/95  90  24  99.6  98% on 30% pap    (X) * Afebrile or temperature acceptable for next level of care    10/29/2020 2:38 PM EDT by Frank Brunner      99.6    (X) * Tachypnea absent    10/29/2020 2:38 PM EDT by Frank Brunner      24    ( ) * Hypoxemia absent    ( ) * Mental status at baseline    (X) * Antibiotic regimen acceptable for next level of care    ( ) * Discharge plans and education understood    Activity    ( ) * Ambulatory or acceptable for next level of care    Routes    ( ) * Oral hydration, medications, and diet    10/29/2020 2:38 PM EDT by Frank Brunner      iv protonix 40 mg bid/ iv zosyn 3.375 q8/ iv vanco 1750 qd/ tpn and lipids/ iv zofran 4 mg x 1    Interventions    (X) * Oxygen absent or at baseline need    ( ) * Isolation not indicated, or is performable at next level of care    10/29/2020 2:38 PM EDT by Frank Brunner      vre and mrsa contact isolation    * Milestone    Additional Notes    10-28 UPDATE    IM SYNOPSIS:1.) Sepsis due to Covid Pneumonia and Bacteremia: Initially resolved, now having intermittent fever again and today leukocytosis is worsening, WBC 15.1 from 97 [id number].  Complicated medical course.  Recent discharge from [facility] for COVID-19 on 9/15/2020.  Readmitted for COVID-19 pneumonia on 9/23/2020, intubation on 9/23/2020 and 10/6/2020.  Successfully weaned to room air currently.  Per ICU notes patient would be a good candidate for Zyvox, however due to patient's inability to swallow he has been unable to receive Zyvox.                                        10/23: Has been treated with vancomycin, doxycycline (10/6-10/14), Zosyn (10/6-10/16) throughout his hospital stay. Antonia Reas MRSA pneumonia.                                                    -Previously had been afebrile however patient febrile to 100.4 on 10/21/2020. Febrile with low grade at 100.0 rectally 10/22/2020.  Patient with residual fever morning of 10/23/2020 up to 103.0 °F                                                    -Patient noted to be septic again with fever, tachycardia, and tachypnea.                                                    -ID has been consulted.  Possible source of infection including recurrent pneumonia, rectal wound, PICC line.                                                     -IV Zosyn restarted, ID recommending 5-7 day course.  We will add IV vancomycin with pharmacy to dose due to recurrent fevers.                                                    -Repeat blood cultures are pending                                                    -Chest x-ray showing worsening infiltrates.                                                     -Will order UA and urine culture, pt is on mason cath for urinary retention, last changed was 10/20 per RN                                                    -Patient unable to perform repeat FEES exam due to decompensation                                                    -Unable to place NG tube previously.  If PICC line removed patient may not be able to receive nutrition.  Patient is at high risk for decompensation.                                                 10/24:  Following ID recommendations, Continue Vanc/Zosyn                                                    - TPN started, IVF 1/2 NS at 60 ml/hr                                                    - Blood Culture (+) for Gram Positive cocci in clusters                                                    - Common Respiratory Panel negative                                                    - Lactic Acid normal, ProCal 4.58                                                10/26: Following ID recommendations, Continue Vanc/Zosyn                                                    - Strep Pneumonia and Legionella Antibody negative                                                    - Trend CBC daily                                                10/27:  Following ID recommendations, Continue Vanc/Zosyn                                                    - Trend CBC daily                                                    - WBC 10.9                                                10/28: Following ID, Vanco/Zosyn to continue for 1 week more                                                    - Trend CBC                                                    - WBC 11.1         2.) Acute hypoxic respiratory failure due to COVID-19 pneumonia:  Intubated on 10/6/2020, extubated on 10/15/2020.                                            10/23: Currently saturating well on room air, however patient became more tachypneic                                                 10/24: Patient on 2L NC                                                 10/26: Still requiring BiPAP continuously                                                 10/27: On BiPAP continuously, would benefit from trach, however the father declines at this time.                                                                                      10/28: On BiPAP continuously.  Will try breaks if Saturation remains stable.         3.) KRISTY due to hypotension (Improving):  Creatinine 1.1 on admission.  Trended up to 4.1 on 10/7/2020. Baseline creatinine 0.6 to 0.8.  Nephrology following.  Appreciate nephrology input.                                            10/23: Creatinine stable at 1.7 today                                                    - Nephrology recommending continuing IV hydration                                                 10/24: Cr 1.5 today, trending down                                                    - Nephrology switched to 1/2 NS at 60 ml/hr                                                 10/26: Cr 1.5, Stable, Nephrology states this may be new baseline                                                    - As needed diuretics                                                    - IVF to prevent Hypotension                                                10/27: Cr 1.5, Stable, likely new baseline                                                    - As needed diuretics                                                    - IVF to prevent Hypotension                                                10/28: Cr 1.3, Slight decrease                                                    - Following with Nephrology recommendations                                                    - Diuretics as needed, IVF for Hypotension         4.) Hypernatremia, likely due to dehydration (Resolved):  Off D5W, Initially resolved, now sodium 146 on 10/23.                                            10/24: Continue 1/2 NS as above per Nephrology                                                    - Recheck BMP in am                                                 10/26: Continue IVF as above                                                    - Recheck IVF                                                10/27: Sodium 138                                                    - Continue IVF as above                                                    - Recheck BMP in AM                                                10/28: Sodium 141                                                    - Continue IVF                                                    - TPN adjusted by Dietary to ensure no overload of electrolytes.         5.) Acute encephalopathy, etiology unclear, likely related to recent Covid 19 infection: 10/23 saw Worsening mentation today with fevers.  Patient likely septic again due to unknown etiology.  Infectious causes include rectal wound, pneumonia, PICC line infection.  CT head on 10/20/2020 unremarkable.                                           10/24: Continue to track mentation                                                10/26: Mentation improving                                                10/27: Able to hold some conversation, unknown baseline                                                10/28: Mention seems to be improving, but still unable to hold a meaningful conversation.         6.) Acute on chronic Normocytic anemia due to acute rectal bleeding- secondary to gluteal/rectal ulcerations vs hemodilutional from IVF, (Improving): Hemoglobin of 8.6 today.  Baseline Hgb 7-8 in 10/2020, was around 11 in 9/2020.  S/p 2 units PRBC.  Blood clot seen by nursing staff today likely secondary to rectal ulcer.  Patient does have rectal bleed from rectal ulcer from Flexi-Seal.                                           10/23: Continue to monitor hemoglobin                                                    - Transfuse for hemoglobin less than 7                                                    - GI on-board.  Sign off due to unlikely GI bleed and rectal bleeding likely secondary to ulceration.                                                10/24: Continue to Monitor bleeding.  If rate worsens, consider checking Fibrinogen and PLTs for possible coagulopathy                                                    - Transfuse if <7                                                    - check H-H at 1400, CBC in am                                                10/26: Received 1 Unit PRBC                                                    - Recheck CBC and H&H                                                    - Monitor Bleeding from rectum                                                                - GI states supportive care and good nutrition as treatment                                                    - Transfuse if HgB <7                                                10/27: Receive 1 unit of PRBC in afternoon                                                    - Recheck CBC and H&H                                                    - Monitor for more passing clots, supportive care at this time    Lyndsey Redmaner                                              - Transfuse if HgB <7                                                10/28: Received 1 unit PRBC in early morning                                                    - Recheck CBC and H&H, (recheck showed HgB 7.9, stable)                                                    - No clots reported overnight                                                    - Transfuse if HgB <7         7.) Hypokalemia due to hypomagnesemia and diarrhea, (Resolved): Replace per protocol, BMP in am .         8.) Hypomagnesemia likely due to poor oral intake and diarrhea, (Resolving): Replace per protocol, check magnesium level in am.         9.) Dysphagia, At risk for Malnutrition: Patient failed Fiberoptic Endoscopic Evaluation of the Swallow (FEES).  ST recommend NPO, per ST NGT not an option right now due to failed FEES.  Started TPN temporarily on 10/21.  ST plan to repeat FEES on Friday 10/23.  However due to patient's fevers and decompensation unable to perform.                                            10/24: TPN in use, follow Dietary recommendations for nutrition and Free Water                                                 10/27: Patient not a good candidate for PEG unless off of BiPAP                                                    - Patient's father does not want a tracheostomy performed at this time         10.) Diarrhea: C. diff test ordered by GI, but not done.  May repeat if continue diarrhea.         11.) Sarahi-rectal lesion: Wound ostomy on-board, appreciate input. Cont zinc oxide as ordered.         12. ) Hx of KAIRA: Noncompliant with CPAP at home, Pulmonology consulted, using BiPAP.                                          Settings: PEEP 6cm H2O                                                            FiO2 30%                                                            O2 flow rate 2 L/min         13.)  Diabetes mellitus type 2:  Blood sugars within goal range of 140-180.  Continue Lantus at current dose and sliding scale insulin.  Accu-Cheks q. 4 and at bedtime, Hypoglycemia protocol in place         14.)  Essential hypertension:  Uncontrolled today due to pt not receiving PO meds ( on amlodipine, cardizem and hydralazine PO at home).  IV hydralazine prn ordered.  Avoid hypotension with recent sepsis and renal failure.         15.) Physical Deconditioning:  Patient likely will need SNF versus LTAC at discharge.  Faye evaluation consult ordered.  Palliative care following in discussed case with father who primary decision-maker. Shaye Model Status discussion should be made.         16.) Thrombocytosis, likely reactive due to recent COVID-19 (Resolved): , recheck with CBC in am         17.) Mild hypercalcemia, likely from dehydration (Resolved): Ca 9.0, Continue IVF per Nephrology. Dianna Joseph with daily BMP.         18.) Urinary retention:  On mason cath, check UA and urine culture.                                               10/24:  No Results back on Urine Culture

## 2020-10-29 NOTE — PROGRESS NOTES
TPN Follow Up Note    Assessment: npo continues, increase to 25 kcal/kg per RD  Electrolyte Replacement: none    TPN changes for (today) at 1800:   Decrease K Acetate to 40 mEq    Re-check BMP, Mg, PO4, iCa 10/30/20 am    Marcelino Arnold, PharmD, BCPS   10/29/2020  1:25 PM

## 2020-10-29 NOTE — PROGRESS NOTES
Meds:  Scheduled Meds:   pantoprazole  40 mg Intravenous BID    vancomycin  1,750 mg Intravenous Q24H    vancomycin (VANCOCIN) intermittent dosing (placeholder)   Other RX Placeholder    sodium chloride flush  10 mL Intravenous 2 times per day    ferrous sulfate  325 mg Oral BID WC    insulin lispro  0-12 Units Subcutaneous Q6H    piperacillin-tazobactam  3.375 g Intravenous Q8H    [Held by provider] gabapentin  400 mg Oral BID    [Held by provider] modafinil  100 mg Oral Daily    [Held by provider] enoxaparin  40 mg Subcutaneous BID    insulin glargine  38 Units Subcutaneous Nightly    lidocaine 1 % injection  5 mL Intradermal Once    magnesium replacement protocol   Other RX Placeholder    phosphorus replacement protocol   Other RX Placeholder    calcium replacement protocol   Other RX Placeholder    [Held by provider] ARIPiprazole  15 mg Oral Daily    [Held by provider] aspirin  81 mg Oral Daily    glycopyrrolate-formoterol  2 puff Inhalation BID     Continuous Infusions:   PN-Adult  3 IN 1 Central Line (Custom) 100 mL/hr at 10/28/20 1739    sodium chloride      dextrose         Assessment:  43 yo M admitted 09/23/20 for fatigue. Recently admitted 09/06/20 for almost 10 days with hypoxemic respiratory failure secondary to COVID-19. Received Decadron and Danazol. Complained of worsening SOB & progressive hypoxia for which he was intubated. Bronchoscopy 09/27/20. Extubated 10/02/20. 10/06/20 became more obtunded & progressive respiratory failure with acute oliguric renal failure, was re-intubated. Hypotensive requiring pressor support, UO declined. He had a rectal tube for a few weeks which was over-inflated, when it was deflated & removed, he was found to have oozing of bright red blood from the rectum, therefore GI was consulted 10/12/20. It was recommended to hold anticoagulants, keep the rectal tube out, and supportive care. Started on Zyvox.  Extubated and now on BiPAP during the day. Patient has been terminated from GI [facility] and will need to follow outpatient with a different GI practice. GI re-consulted 10/25/20 for passing large, bloody clot. 1. Acute rectal bleeding- secondary to large perianal ulcer extending into anal canal  2. Acute hypoxemic respiratory failure secondary to #3  3. COVID-19 positive  4. Pneumonia  5. Sepsis secondary to #3 & 4  6. KRISTY   7. Hypotension  8. Acute on chronic anemia- stable, s/p 2 units PRBC  9. DM  10. H/O HTN  11. KIARA  12. Diastolic heart failure   13. Iron deficiency  14. Acute diarrhea  15.  Very acutely ill patient       Plan:    · Monitor H & H, transfuse prn  · PO PPI daily  · Nursing to monitor stool output  · PO Iron when patient is able to safely have PO intake  · Wound & ostomy on board  · Electrolyte management per nephrology  · ATBs per ID  · ST following  · TPN per primary  · Nephrology & ID on board  · Supportive care per primary team  Will follow intermittently    Case reviewed and impression/plan reviewed in collaboration with Dr. Walt Miramontes  Electronically signed by Collette Shaggy, APRN - CNP on 10/29/2020 at 1:03 PM    [facility]

## 2020-10-30 ENCOUNTER — APPOINTMENT (OUTPATIENT)
Dept: GENERAL RADIOLOGY | Age: 50
DRG: 870 | End: 2020-10-30
Payer: MEDICARE

## 2020-10-30 LAB
ANION GAP SERPL CALCULATED.3IONS-SCNC: 9 MEQ/L (ref 8–16)
BUN BLDV-MCNC: 13 MG/DL (ref 7–22)
CALCIUM IONIZED: 1.19 MMOL/L (ref 1.12–1.32)
CALCIUM SERPL-MCNC: 10 MG/DL (ref 8.5–10.5)
CHLORIDE BLD-SCNC: 94 MEQ/L (ref 98–111)
CO2: 31 MEQ/L (ref 23–33)
CREAT SERPL-MCNC: 1.4 MG/DL (ref 0.4–1.2)
GFR SERPL CREATININE-BSD FRML MDRD: 64 ML/MIN/1.73M2
GLUCOSE BLD-MCNC: 170 MG/DL (ref 70–108)
GLUCOSE BLD-MCNC: 180 MG/DL (ref 70–108)
GLUCOSE BLD-MCNC: 187 MG/DL (ref 70–108)
GLUCOSE BLD-MCNC: 210 MG/DL (ref 70–108)
GLUCOSE BLD-MCNC: 215 MG/DL (ref 70–108)
GLUCOSE BLD-MCNC: 217 MG/DL (ref 70–108)
GLUCOSE BLD-MCNC: 218 MG/DL (ref 70–108)
GLUCOSE BLD-MCNC: 229 MG/DL (ref 70–108)
GLUCOSE BLD-MCNC: 230 MG/DL (ref 70–108)
MAGNESIUM: 2 MG/DL (ref 1.6–2.4)
PHOSPHORUS: 3.5 MG/DL (ref 2.4–4.7)
POTASSIUM SERPL-SCNC: 4.6 MEQ/L (ref 3.5–5.2)
SODIUM BLD-SCNC: 134 MEQ/L (ref 135–145)
VANCOMYCIN TROUGH: 23.6 UG/ML (ref 5–15)

## 2020-10-30 PROCEDURE — 80202 ASSAY OF VANCOMYCIN: CPT

## 2020-10-30 PROCEDURE — C9113 INJ PANTOPRAZOLE SODIUM, VIA: HCPCS | Performed by: FAMILY MEDICINE

## 2020-10-30 PROCEDURE — 84100 ASSAY OF PHOSPHORUS: CPT

## 2020-10-30 PROCEDURE — 82948 REAGENT STRIP/BLOOD GLUCOSE: CPT

## 2020-10-30 PROCEDURE — 83735 ASSAY OF MAGNESIUM: CPT

## 2020-10-30 PROCEDURE — 92611 MOTION FLUOROSCOPY/SWALLOW: CPT

## 2020-10-30 PROCEDURE — 2580000003 HC RX 258: Performed by: STUDENT IN AN ORGANIZED HEALTH CARE EDUCATION/TRAINING PROGRAM

## 2020-10-30 PROCEDURE — 94640 AIRWAY INHALATION TREATMENT: CPT

## 2020-10-30 PROCEDURE — 80048 BASIC METABOLIC PNL TOTAL CA: CPT

## 2020-10-30 PROCEDURE — 99233 SBSQ HOSP IP/OBS HIGH 50: CPT | Performed by: HOSPITALIST

## 2020-10-30 PROCEDURE — 2060000000 HC ICU INTERMEDIATE R&B

## 2020-10-30 PROCEDURE — 6360000002 HC RX W HCPCS: Performed by: FAMILY MEDICINE

## 2020-10-30 PROCEDURE — 92526 ORAL FUNCTION THERAPY: CPT

## 2020-10-30 PROCEDURE — 36415 COLL VENOUS BLD VENIPUNCTURE: CPT

## 2020-10-30 PROCEDURE — 74230 X-RAY XM SWLNG FUNCJ C+: CPT

## 2020-10-30 PROCEDURE — 82330 ASSAY OF CALCIUM: CPT

## 2020-10-30 PROCEDURE — 6360000002 HC RX W HCPCS: Performed by: INTERNAL MEDICINE

## 2020-10-30 PROCEDURE — 94660 CPAP INITIATION&MGMT: CPT

## 2020-10-30 PROCEDURE — 6370000000 HC RX 637 (ALT 250 FOR IP): Performed by: INTERNAL MEDICINE

## 2020-10-30 PROCEDURE — 6370000000 HC RX 637 (ALT 250 FOR IP): Performed by: NURSE PRACTITIONER

## 2020-10-30 PROCEDURE — 94761 N-INVAS EAR/PLS OXIMETRY MLT: CPT

## 2020-10-30 PROCEDURE — 2500000003 HC RX 250 WO HCPCS: Performed by: HOSPITALIST

## 2020-10-30 PROCEDURE — 99232 SBSQ HOSP IP/OBS MODERATE 35: CPT | Performed by: INTERNAL MEDICINE

## 2020-10-30 PROCEDURE — 2700000000 HC OXYGEN THERAPY PER DAY

## 2020-10-30 PROCEDURE — 2500000003 HC RX 250 WO HCPCS: Performed by: PHARMACIST

## 2020-10-30 PROCEDURE — 6360000002 HC RX W HCPCS: Performed by: STUDENT IN AN ORGANIZED HEALTH CARE EDUCATION/TRAINING PROGRAM

## 2020-10-30 PROCEDURE — 2580000003 HC RX 258: Performed by: FAMILY MEDICINE

## 2020-10-30 RX ADMIN — INSULIN LISPRO 4 UNITS: 100 INJECTION, SOLUTION INTRAVENOUS; SUBCUTANEOUS at 17:13

## 2020-10-30 RX ADMIN — BARIUM SULFATE 20 ML: 400 PASTE ORAL at 11:18

## 2020-10-30 RX ADMIN — PIPERACILLIN AND TAZOBACTAM 3.38 G: 3; .375 INJECTION, POWDER, LYOPHILIZED, FOR SOLUTION INTRAVENOUS at 03:24

## 2020-10-30 RX ADMIN — DARBEPOETIN ALFA 60 MCG: 60 INJECTION, SOLUTION INTRAVENOUS; SUBCUTANEOUS at 12:15

## 2020-10-30 RX ADMIN — SODIUM CHLORIDE: 234 INJECTION INTRAMUSCULAR; INTRAVENOUS; SUBCUTANEOUS at 17:37

## 2020-10-30 RX ADMIN — PIPERACILLIN AND TAZOBACTAM 3.38 G: 3; .375 INJECTION, POWDER, LYOPHILIZED, FOR SOLUTION INTRAVENOUS at 18:55

## 2020-10-30 RX ADMIN — INSULIN LISPRO 2 UNITS: 100 INJECTION, SOLUTION INTRAVENOUS; SUBCUTANEOUS at 05:22

## 2020-10-30 RX ADMIN — PANTOPRAZOLE SODIUM 40 MG: 40 INJECTION, POWDER, FOR SOLUTION INTRAVENOUS at 08:40

## 2020-10-30 RX ADMIN — PIPERACILLIN AND TAZOBACTAM 3.38 G: 3; .375 INJECTION, POWDER, LYOPHILIZED, FOR SOLUTION INTRAVENOUS at 12:14

## 2020-10-30 RX ADMIN — GLYCOPYRROLATE AND FORMOTEROL FUMARATE 2 PUFF: 9; 4.8 AEROSOL, METERED RESPIRATORY (INHALATION) at 19:58

## 2020-10-30 RX ADMIN — INSULIN LISPRO 4 UNITS: 100 INJECTION, SOLUTION INTRAVENOUS; SUBCUTANEOUS at 12:20

## 2020-10-30 RX ADMIN — VANCOMYCIN HYDROCHLORIDE 1750 MG: 5 INJECTION, POWDER, LYOPHILIZED, FOR SOLUTION INTRAVENOUS at 12:15

## 2020-10-30 RX ADMIN — GLYCOPYRROLATE AND FORMOTEROL FUMARATE 2 PUFF: 9; 4.8 AEROSOL, METERED RESPIRATORY (INHALATION) at 08:50

## 2020-10-30 RX ADMIN — FERROUS SULFATE TAB 325 MG (65 MG ELEMENTAL FE) 325 MG: 325 (65 FE) TAB at 17:30

## 2020-10-30 RX ADMIN — BARIUM SULFATE 50 ML: 0.81 POWDER, FOR SUSPENSION ORAL at 11:18

## 2020-10-30 RX ADMIN — SODIUM CHLORIDE, PRESERVATIVE FREE 10 ML: 5 INJECTION INTRAVENOUS at 08:40

## 2020-10-30 RX ADMIN — INSULIN GLARGINE 38 UNITS: 100 INJECTION, SOLUTION SUBCUTANEOUS at 21:21

## 2020-10-30 RX ADMIN — PANTOPRAZOLE SODIUM 40 MG: 40 INJECTION, POWDER, FOR SOLUTION INTRAVENOUS at 21:21

## 2020-10-30 RX ADMIN — INSULIN LISPRO 4 UNITS: 100 INJECTION, SOLUTION INTRAVENOUS; SUBCUTANEOUS at 00:30

## 2020-10-30 RX ADMIN — INSULIN LISPRO 4 UNITS: 100 INJECTION, SOLUTION INTRAVENOUS; SUBCUTANEOUS at 23:41

## 2020-10-30 ASSESSMENT — PAIN SCALES - GENERAL
PAINLEVEL_OUTOF10: 0
PAINLEVEL_OUTOF10: 0

## 2020-10-30 ASSESSMENT — PAIN SCALES - WONG BAKER
WONGBAKER_NUMERICALRESPONSE: 0
WONGBAKER_NUMERICALRESPONSE: 0

## 2020-10-30 NOTE — PROGRESS NOTES
[facility] ICU STEPDOWN TELEMETRY 4K  Modified Barium Swallow    SLP Individual Minutes  Time In: 1100  Time Out: 4688  Minutes: 13  Timed Code Treatment Minutes: 0 Minutes       Date: 10/30/2020  Patient Name: Alba Mitchell      CSN: [id number]   : 1970  (44 y.o.)  Gender: male   Referring Physician: Ruba Srivastava MD  Diagnosis: Acute respiratory failure with hypoxia   Secondary Diagnosis: Dysphagia; cognitive deficits   Precautions: Fall risk, aspiration precautions   History of Present Illness/Injury: Pt admitted to [facility] with above med dx; please refer to physician H&P for full details. Pt with complicated medical course. Hospitalization required 2020-9/15/2020 with hypoxemic respiratory failure s/t COVID-19 associated with diffuse bilateral infiltrates. Return to the ED with deterioration and intubation required; extubation completed 10/2/2020 with reintubation required 10/6/2020; extubation on 10/15/2020. Pt with fluctuating respiratory function with intermittent need for BiPAP. Poor alertness levels and decreased direction following serving as barriers towards PO consumption and completion of instrumental assessment. Pt demonstrating improved mental status with MBS recommended to further assess and r/o pharyngeal dysfunction.     has a past medical history of Arthritis, Atrial fibrillation ([address] BPH (benign prostatic hyperplasia), CAD (coronary artery disease), Carpal tunnel syndrome on right, Chronic gout, DM2 (diabetes mellitus, type 2) ([address] GERD (gastroesophageal reflux disease), Heart failure with preserved ejection fraction ([address] History of alcohol abuse, History of osteomyelitis, Hyperlipidemia, Hypertension, essential, Hypogonadism, male, Major depression, Mood disorder ([address] Morbid obesity ([address] Muscle weakness, DURAN (nonalcoholic steatohepatitis), Obstructive sleep apnea treated with continuous positive airway pressure (CPAP), KIARA (obstructive sleep apnea), and Vitamin D deficiency. Current Diet: NPO with TPN     Pain: No pain reported. SUBJECTIVE:  Pt brought down to fluoroscopy suite via bed. Given morbid obesity and poor positioning, difficulty obtaining fully clear imaging. Pt alert and cooperative throughout. OBJECTIVE:    Respiratory Status:  Independent and BiPAP use as needed     Behavioral Observation:  Alert and cooperative     PATIENT WAS EVALUATED USING:  Puree, soft solids, hard solids, thin barium by cup/straw     ORAL PREPARATION PHASE:  WFL    ORAL PHASE: Impaired:  intermittent oral holding d/t fatigue      ORAL PHASE BOBBY SCORE: (Dysphagia outcome and severity scale)  6 = WFL/Modified Independent - Normal Diet - May have mild oral delay    PHARYNGEAL PHASE:  Impaired: Delayed Swallow, Decreased Airway Protection and Decreased Hyolaryngeal Elevation     PHARYNGEAL PHASE BOBBY SCORE: (Dysphagia outcome and severity scale)  5 = Mild Dysphagia - may need one consistency restricted - May have one or more of the following: Aspiration with thin - cough to clear, Airway penetration midway to the vocal cords with one or more consistency or to the vocal folds with one consistency, but clears spontaneously - Residue in the pharynx clears spontaneously    EVIDENCE FOR LARYNGEAL PENETRATION AND/OR ASPIRATION:  No evidence of aspiration  Laryngeal penetration evident with thin barium by cup/straw    PENETRATION-ASPIRATION SCALE (PAS):   Thin Liquids: 3 = Material enters the airway, remains above the vocal folds, and is not ejected from the airway  Puree:  1 = Material does not enter the airway  Soft Solid:  1 = Material does not enter the airway  Hard Solid: 1 = Material does not enter the airway    ESOPHAGEAL PHASE:   No significant findings and See Radiology Report for details    ATTEMPTED TECHNIQUES:  Small Bolus Size Effective    Straw Ineffective    Cup Effective    Chin Tuck Not Attempted    Head Turn Not Attempted Spoon Presentations Not Attempted    Volitional Cough Not Attempted    Spontaneous Cough Not Attempted           DIAGNOSTIC IMPRESSIONS:  Pt presents with Mod I level of oral swallow function and mild pharyngeal dysphagia which is highly affected/influenced by current level of fatigue levels. Pt demonstrations of appropriate bolus breakdown, formation and transit; however, thereafter pt with presence of oral holding resulting in a delayed swallow trigger with decreased laryngeal elevation and anterior hyolaryngeal excursion intermittently noted. Decreased airway protection resulting in presence of transient penetration of thin barium by cup during the swallow and shallow penetration of thin barium by straw during the swallow. No observed aspiration. Pt certainly at heightened risks for aspiration in uncontrolled setting given current level of fatigue, fluctuating mental status as well as fluctuations within respiratory function with intermittent need for utilization of BiPAP. Recommendations for initiation of conservative minced/moist diet and thin liquids with restriction on straw and direct 1:1 assistance with meals. Pt safe to consume PO intake only when fully alert and once off the BiPAP for a minimum of 30-60 minutes to ensure safe consumption. ST to follow up with dysphagia management and comprehensive ST assessment in order to further establish Goals/POC.    Diet Recommendations:  Minced/moist and thin liquids   Strategies:  Full Upright Position, Small Bite/Sip, No Straw, Pulmonary Monitoring, Medication in Applesauce, Direct 1:1 Supervision, Alternate Solids and Liquids, Limit Distractions and Monitor for Fatigue   Rehabilitation Potential: good    EDUCATION:  Learner: Patient  Education:  Reviewed results and recommendations of this evaluation, Reviewed diet and strategies and Reviewed ST goals and Plan of Care  Evaluation of Education: Verbalizes understanding, Demonstrates with assistance, Needs further instruction and Family not present    PLAN:  Skilled SLP intervention on acute care 3-5 x per week or until goals met and/or pt plateaus in function. Specific interventions for next session may include: dietary analysis, ST comprehensive assessment . PATIENT GOAL:    Did not state. Will further assess during treatment. SHORT TERM GOALS:  Short-term Goals  Timeframe for Short-term Goals: 2 weeks  Goal 1: Pt will consume a minced/moist diet and thin liquids (no straws) without overt s/s of aspiration and utilization of safe, compensatory swallowing strategies to meet nutritional/hydration measures safely. Goal 2: Pt will consume advanced PO solids and thin liquids by straw with ST only (as alertness and endurance improves) without overt s/s of aspiration in 10/10 trials for potential upgrade to least restrictive PO diet. Goal 3: Complete comprehensive ST/cognitive assessment and add goals as indicated. Goal 4: .       LONG TERM GOALS:  No established LTG's given short ARINA Doyle 10/30/2020

## 2020-10-30 NOTE — PROGRESS NOTES
[address] [place] [address]  INPATIENT SPEECH THERAPY  STRZ RENAL TELEMETRY 6K  DAILY NOTE    TIME   SLP Individual Minutes  Time In: 4779  Time Out: 1000  Minutes: 11  Timed Code Treatment Minutes: 0 Minutes       Date: 10/30/2020  Patient Name: Miguel Anand      CSN: [id number]   : 1970  (44 y.o.)  Gender: male   Referring Physician: Maria Del Rosario Mariscal MD  Diagnosis: Acute respiratory failure with hypoxemia   Secondary Diagnosis: Dysphagia   Precautions: Fall risk, aspiration precautions   Current Diet: NPO - TPN  Swallowing Strategies: Strict oral care  Date of Last MBS: Not Applicable    Pain:  No pain reported. Subjective:  Pt seen resting in bed. Required high levels of verbal cues and sternal rub in order to enhance alertness levels for completion of PO consumption. Repositioning provided prior to PO consumption. Pt with improved direction following, alertness levels and compliance with therapy this date. Plans for MBS. See below for details r/t swallow function. Short-Term Goals:  SHORT TERM GOAL #1:  Goal 1: Pt will safely consume PO trials of thin liquids and purees  (and advanced texture trials as deemed clinically indicated) demonstrating consistent pharyngeal swallow trigger and endurance to determine readiness for potential PO diet level initiation. INTERVENTIONS: Skilled dysphagia therapy via PO trials this date. Pt with trials of thin liquids by spoon x2, thin liquids by cup x2 and applesauce x2. Pt only agreeable to minimal trials this date despite encouragement. No overt s/s of aspiration. Pt demonstrations of significantly improved bolus formation/transit with absence of oral holding. Improved timeliness of pharyngeal swallow trigger and what appeared to be adequate laryngeal elevation upon manual palpation (difficulty to fully assess given adipose tissue). Pt with no overt s/s of aspiration.  Recommendations for MBS to further assess and r/o pharyngeal dysfunction given complex medical course prior to initiating a PO diet. SHORT TERM GOAL #2:  Goal 2: Staff will exhibit return demonstration for implementation of comprehensive oral care procedural analysis and administration of ice chips post oral care to maximize oral integrity and preserve swallow mechanism. INTERVENTIONS: DNT d/t focus on other goals     SHORT TERM GOAL #3:  Goal 3: Monitor pulmonary status and readiness for potential formal instrumentation; complete MBS/FEES should it become clinically indicated. INTERVENTIONS: Plan MBS this date. SHORT TERM GOAL #4:   Goal 4: Complete full ST assessment and add goal as indicated. INTERVENTIONS: Improved levels of alertness and direction following. Absence of confused language this date. Will continue to monitor and complete further ST assessment as indicated. Long-Term Goals:   No established LTG's given short ELOS        EDUCATION:  Learner: Patient  Education:  Reviewed results and recommendations of this evaluation and Reviewed ST goals and Plan of Care  Evaluation of Education: Needs further instruction, No evidence of learning and Family not present    ASSESSMENT/PLAN:  Activity Tolerance:  Patient tolerance of  Treatment: good   Assessment/Plan: Patient progressing toward established goals. Continues to require skilled care of licensed speech pathologist to progress toward achievement of established goals and plan of care. .     Plan for Next Session: ARINA Agustin 10/30/2020

## 2020-10-30 NOTE — PLAN OF CARE
Ongoing  Note: Diarrhea, active bowel sounds. Problem: Skin Integrity/Risk  Goal: No skin breakdown during hospitalization  Outcome: Ongoing  Note: Multiple wounds present, zinc cream applied, Q2 turn     Problem: Infection - Central Venous Catheter-Associated Bloodstream Infection:  Goal: Will show no infection signs and symptoms  Description: Will show no infection signs and symptoms  Outcome: Ongoing  Note: Alcohol caps in use, CHG bath daily     Problem: Urinary Elimination:  Goal: Signs and symptoms of infection will decrease  Description: Signs and symptoms of infection will decrease  Outcome: Ongoing  Note: Dimas care Q shift.       Problem: Discharge Planning:  Goal: Discharged to appropriate level of care  Description: Discharged to appropriate level of care  Outcome: Ongoing  Note: Patient from nursing home, potential magnolia at discharge, need precert

## 2020-10-30 NOTE — PROGRESS NOTES
Progress note: Infectious diseases    Patient - Darice Carrel,  Age - 44 y.o.    - 1970      Room Number - 4K-10/010-A   MRN -  [id number]   Acct # - [de-identified]  Date of Admission -  2020  9:02 AM    SUBJECTIVE:   No new complaints. Discussed with GI.  OBJECTIVE   VITALS    height is 5' 8\" (1.727 m) and weight is 284 lb 6.4 oz (129 kg). His oral temperature is 98 °F (36.7 °C). His blood pressure is 132/82 and his pulse is 103. His respiration is 20 and oxygen saturation is 98%. Wt Readings from Last 3 Encounters:   10/30/20 284 lb 6.4 oz (129 kg)   09/15/20 281 lb 6.4 oz (127.6 kg)   20 (!) 306 lb 6.4 oz (139 kg)       I/O (24 Hours)    Intake/Output Summary (Last 24 hours) at 10/30/2020 1308  Last data filed at 10/30/2020 0433  Gross per 24 hour   Intake 7152.82 ml   Output 3400 ml   Net 3752.82 ml       General Appearance awake and oriented,    HEENT - normocephalic, atraumatic, pale  conjunctiva,  anicteric sclera    Neck - Supple, no mass  Lungs -  Bilateral   air entry, diminished breath sound  Cardiovascular - Heart sounds are normal.    Abdomen - soft, not distended, nontender,   Neurologic awake   Skin - No bruising or bleeding  Extremities - chronic leg edema, perianal open wound.        MEDICATIONS:      darbepoetin alejandro-polysorbate  60 mcg Subcutaneous Weekly - Thursday    pantoprazole  40 mg Intravenous BID    vancomycin  1,750 mg Intravenous Q24H    vancomycin (VANCOCIN) intermittent dosing (placeholder)   Other RX Placeholder    sodium chloride flush  10 mL Intravenous 2 times per day    ferrous sulfate  325 mg Oral BID WC    insulin lispro  0-12 Units Subcutaneous Q6H    piperacillin-tazobactam  3.375 g Intravenous Q8H    [Held by provider] gabapentin  400 mg Oral BID    [Held by provider] modafinil  100 mg Oral Daily    [Held by provider] enoxaparin  40 mg Subcutaneous BID    insulin glargine  38 Units Subcutaneous Nightly    lidocaine 1 % injection  5 mL Intradermal Once    magnesium replacement protocol   Other RX Placeholder    phosphorus replacement protocol   Other RX Placeholder    calcium replacement protocol   Other RX Placeholder    [Held by provider] ARIPiprazole  15 mg Oral Daily    [Held by provider] aspirin  81 mg Oral Daily    glycopyrrolate-formoterol  2 puff Inhalation BID      PN-Adult  3 IN 1 Central Line (Custom)      PN-Adult  3 IN 1 Central Line (Custom) 100 mL/hr at 10/29/20 1737    sodium chloride      dextrose       sodium chloride flush, hydrALAZINE, acetaminophen, potassium chloride **OR** potassium alternative oral replacement **OR** potassium chloride, potassium chloride, lidocaine, acetaminophen **OR** [DISCONTINUED] acetaminophen, polyethylene glycol, promethazine **OR** ondansetron, albuterol, glucose, dextrose, glucagon (rDNA), dextrose      LABS:     CBC:   Recent Labs     10/27/20  2355 10/28/20  0920 10/29/20  0400   WBC  --  11.1* 11.2*   HGB 7.9* 7.9* 8.4*   PLT  --  315 365     BMP:    Recent Labs     10/28/20  0440 10/29/20  0400 10/30/20  0330    139 134*   K 5.0 5.1 4.6    98 94*   CO2 30 32 31   BUN 17 14 13   CREATININE 1.3* 1.4* 1.4*   GLUCOSE 164* 152* 170*     Calcium:  Recent Labs     10/30/20  0330   CALCIUM 10.0     Ionized Calcium:No results for input(s): IONCA in the last 72 hours. Magnesium:  Recent Labs     10/30/20  0330   MG 2.0     Phosphorus:  Recent Labs     10/30/20  0330   PHOS 3.5     BNP:No results for input(s): BNP in the last 72 hours. Glucose:  Recent Labs     10/30/20  0552 10/30/20  1127 10/30/20  1138   POCGLU 180* 215* 218*     HgbA1C: No results for input(s): LABA1C in the last 72 hours. INR:   No results for input(s): INR in the last 72 hours.   Hepatic:   Recent Labs     10/28/20  0440   ALKPHOS 67   ALT 11   AST 13   PROT 6.0*   BILITOT 0.6   LABALBU 3.0*        CULTURES:   UA: No results for input(s): Carlos Isaura, [address], CLARITYU, MUCUS, PROTEINU, BLOODU, RBCUA, WBCUA, BACTERIA, NITRU, GLUCOSEU, BILIRUBINUR, UROBILINOGEN, KETUA, LABCAST, LABCASTTY, AMORPHOS in the last 72 hours. Invalid input(s): CRYSTALS  Micro:   Lab Results   Component Value Date    BC No growth-preliminary  10/28/2020          Problem list of patient:     Patient Active Problem List   Diagnosis Code    Chronic diastolic congestive heart failure (HCC) I50.32    Atrial fibrillation (HCC) I48.91    BPH (benign prostatic hyperplasia) N40.0    Carpal tunnel syndrome on right G56.01    Chronic gout M1A. 9XX0    DM2 (diabetes mellitus, type 2) ([facility] E11.9    Heart failure with preserved ejection fraction (HCC) I50.30    History of alcohol abuse F10.11    History of osteomyelitis Z87.39    Essential hypertension I10    Hypogonadism, male E29.1    Major depression F32.9    Morbid obesity ([facility]) E66.01    DURAN (nonalcoholic steatohepatitis) K75.81    KIARA (obstructive sleep apnea) G47.33    Vitamin D deficiency E55.9    Normocytic anemia D64.9    Physical deconditioning R53.81    Mood disorder ([facility]) F39    Hallucinations R44.3    GERD (gastroesophageal reflux disease) K21.9    Wound of buttock S31.809A    Chest wall pain with tenderness R07.89    Primary osteoarthritis of left hip M16.12    COVID-19 U07.1    COVID-19 virus infection U07.1    Acute respiratory failure with hypoxia (HCC) J96.01    ARF (acute renal failure) with tubular necrosis (HCC) N17.0    Hypokalemia E87.6    Other hypotension I95.89    Pneumonia due to COVID-19 virus U07.1, J12.89    Sepsis due to COVID-19 (HCC) U07.1, A41.89    KRISTY (acute kidney injury) ([facility] N17.9    Hypernatremia E87.0    Acute encephalopathy G93.40    Acute on chronic anemia D64.9    Hypomagnesemia E83.42    Dysphagia R13.10    Diarrhea R19.7    At risk for malnutrition Z91.89    Reactive thrombocytosis R79.89         ASSESSMENT/PLAN Respiratory failure following COVID -19 infection:  Doing well  Anemia:     Deconditioning  Ulceration of the rectum with intermittent bleed slowly improving. Obesity  CHF  Continue current treatment.   Corinn Sever, MD, FACP 10/30/2020 1:08 PM

## 2020-10-30 NOTE — FLOWSHEET NOTE
10/30/20 1233   Encounter Summary   Services provided to: Family   Referral/Consult From: [facility] Family members   Continue Visiting Yes  (10/30)   Complexity of Encounter Low   Length of Encounter 15 minutes   Spiritual/Mosque   Type Spiritual support   During my encounter with the  50yr old patient, I attempted to visit with the pt on  4K. The patient appears to be resting now and I didnt want to disturb the patient. I or another Myrna Mccracken will attempt to visit the patient or the family at another time. I attempted to contact the pt's family but there was no response.

## 2020-10-30 NOTE — PROGRESS NOTES
Pt Name: Sterling Mcclure  MRN: [id number]  [id number]  YOB: 1970  Admit Date: 9/23/2020  9:02 AM  Date of evaluation: 10/30/2020  Primary Care Physician: Espinoza Jay MD   4K-10/010-A     MARQUISE    S. Off Bipap but somnolent    O.     Vitals:    10/30/20 0830   BP: 111/68   Pulse: 98   Resp: 20   Temp: 98 °F (36.7 °C)   SpO2: 98%       Body mass index is 43.24 kg/m². Arousable but somnolent and poorly responsive   clear to auscultation bilaterally   regular rate and rhythm   Soft and nondistended with normal BS, nontender   no cyanosis, clubbing or edema present      Current Meds    pantoprazole  40 mg Intravenous BID    vancomycin  1,750 mg Intravenous Q24H    vancomycin (VANCOCIN) intermittent dosing (placeholder)   Other RX Placeholder    sodium chloride flush  10 mL Intravenous 2 times per day    ferrous sulfate  325 mg Oral BID WC    insulin lispro  0-12 Units Subcutaneous Q6H    piperacillin-tazobactam  3.375 g Intravenous Q8H    [Held by provider] gabapentin  400 mg Oral BID    [Held by provider] modafinil  100 mg Oral Daily    [Held by provider] enoxaparin  40 mg Subcutaneous BID    insulin glargine  38 Units Subcutaneous Nightly    lidocaine 1 % injection  5 mL Intradermal Once    magnesium replacement protocol   Other RX Placeholder    phosphorus replacement protocol   Other RX Placeholder    calcium replacement protocol   Other RX Placeholder    [Held by provider] ARIPiprazole  15 mg Oral Daily    [Held by provider] aspirin  81 mg Oral Daily    glycopyrrolate-formoterol  2 puff Inhalation BID      PN-Adult  3 IN 1 Central Line (Custom) 100 mL/hr at 10/29/20 1737    sodium chloride      dextrose       Diet: Diet NPO Time Specified  PN-Adult  3 IN 1 Central Line (Custom)    A.   43 yo BM admitted 09/23/20 for fatigue. Recently admitted 09/06/20 for almost 10 days with hypoxemic respiratory failure secondary to COVID-19. Received Decadron and Danazol. Complained of worsening SOB & progressive hypoxia for which he was intubated. Bronchoscopy 09/27/20. Extubated 10/02/20. 10/06/20 became more obtunded & progressive respiratory failure with acute oliguric renal failure, was re-intubated. Hypotensive requiring pressor support, UO declined. He had a rectal tube for a few weeks which was over-inflated, when it was deflated & removed, he was found to have oozing of bright red blood from the rectum, therefore GI was consulted 10/12/20. It was recommended to hold anticoagulants, keep the rectal tube out, and supportive care. Started on Zyvox. Extubated and placed on BiPAP during the day. Patient has been terminated from [facility] and will need to follow outpatient with a different GI practice. GI re-consulted 10/25/20 after passing large bloody clot  Acute rectal bleeding- secondary to gluteal/rectal ulcerations  Mesalamine supp recommended but hosp doesn't stock  FRANK by labs 10/19/20 - iv iron given 10/26/20  Acute on chronic anemia- stable, s/p 5 PRBC, 2 FFP  EPO low due to AOCD  Flex sig 10/26/20 with gluteal ulcer extending perianal and into anal canal, no intervention possible as not rectal ulcder    Chronic diarrhea - better on bowel rest  Currently on bowel rest with TPN for gluteal wounds    Dysphagia - OP, failed FEES    Acute hypoxemic respiratory failure, on Bipap  COVID-19 positive 9/6/20  Pneumonia  Sepsis   KRISTY   Hypotension  DM  H/O HTN  KIARA  Diastolic heart failure     Very acutely ill patient     P.       Follow H&H and transfuse prn  Begin Aranesp for AOCD with Hgb < 10    OK for NGT and TF per GI, but may cause Bipap to not seal well  Would be candidate for PEG if intubated (not on Bipap) or if O2 sats continue to improve  Trial of po when ST deems appropriate  Beginning po may worsen anal/perianal ulcers    Vic Camejo MD  9:23 AM 10/30/2020

## 2020-10-30 NOTE — PROGRESS NOTES
[facility]         Renal Inpatient Follow-Up note         10/30/2020 12:11 PM    Pt Name:   Don Colin  YOB: 1970  Attending:   Marielena Canseco MD    Chief Complaint : Don Colin is a 44 y.o. male being followed by nephrology for acute kidney injury/electrolyte abnormalities    Interval History :   Patient seen and examined by me. No distress. Feels well no chest pain or shortness of breath. Is more awake and alert.      Scheduled Medications :    darbepoetin alejandro-polysorbate  60 mcg Subcutaneous Weekly - Thursday    pantoprazole  40 mg Intravenous BID    vancomycin  1,750 mg Intravenous Q24H    vancomycin (VANCOCIN) intermittent dosing (placeholder)   Other RX Placeholder    sodium chloride flush  10 mL Intravenous 2 times per day    ferrous sulfate  325 mg Oral BID WC    insulin lispro  0-12 Units Subcutaneous Q6H    piperacillin-tazobactam  3.375 g Intravenous Q8H    [Held by provider] gabapentin  400 mg Oral BID    [Held by provider] modafinil  100 mg Oral Daily    [Held by provider] enoxaparin  40 mg Subcutaneous BID    insulin glargine  38 Units Subcutaneous Nightly    lidocaine 1 % injection  5 mL Intradermal Once    magnesium replacement protocol   Other RX Placeholder    phosphorus replacement protocol   Other RX Placeholder    calcium replacement protocol   Other RX Placeholder    [Held by provider] ARIPiprazole  15 mg Oral Daily    [Held by provider] aspirin  81 mg Oral Daily    glycopyrrolate-formoterol  2 puff Inhalation BID      PN-Adult  3 IN 1 Central Line (Custom) 100 mL/hr at 10/29/20 1737    sodium chloride      dextrose         Vitals :  /82   Pulse 103   Temp 98 °F (36.7 °C) (Oral)   Resp 20   Ht 5' 8\" (1.727 m)   Wt 284 lb 6.4 oz (129 kg)   SpO2 98%   BMI 43.24 kg/m²     24HR INTAKE/OUTPUT:      Intake/Output Summary (Last 24 hours) at 10/30/2020 1211  Last data filed at 10/30/2020 0433  Gross per 24 hour   Intake 7152.82 ml   Output 3400 ml   Net 3752.82 ml     Last 3 weights  Wt Readings from Last 3 Encounters:   10/30/20 284 lb 6.4 oz (129 kg)   09/15/20 281 lb 6.4 oz (127.6 kg)   08/21/20 (!) 306 lb 6.4 oz (139 kg)           Physical Exam :  General Appearance: Obese well-nourished no distress  CNS-appears to be grossly intact  Psych-not agitated  Lungs diminished, poor air entry  Abdomen: Soft non-tender  Musculoskeletal:  Edema -no edema           Last 3 CBC   Recent Labs     10/27/20  2355 10/28/20  0920 10/29/20  0400   WBC  --  11.1* 11.2*   RBC  --  2.66* 2.82*   HGB 7.9* 7.9* 8.4*   HCT 24.7* 24.8* 26.8*   PLT  --  315 365     Last 3 CMP  Recent Labs     10/28/20  0440 10/29/20  0400 10/30/20  0330    139 134*   K 5.0 5.1 4.6    98 94*   CO2 30 32 31   BUN 17 14 13   CREATININE 1.3* 1.4* 1.4*   CALCIUM 9.6 9.8 10.0   LABALBU 3.0*  --   --    BILITOT 0.6  --   --              ASSESSMENT / Plan   1 Renal -acute kidney injury most likely due to septic ATN/hypotension  ? Overall stable 1.3 - 1.5. probably new baseline  ? As needed diuretics. Decent UO    2 Electrolytes - doing well. Supplemented by TPN. Pharmacy adjusting the electrolytes closely  3 Anemia- S/P post rectal clot. GI on board. Had an endoscopy done which showed rectal ulcer  4 Hypotension -appears to be resolved  5 Hx of diabetes mellitus  6 Hypercalcemia-fluctuating but overall stable  7 Pneumonia on abx - follow vanc levels closley ID on board. Last trough was 15.9 on 10/25/20  8 Hx of COVID-19 pneumonia   9 Hx of diastolic dysfunction volume status reasonable closely follow. As needed diuretics  10 Meds reviewed      Overall renal issues stable/resolved please call us with any questions or concerns we will sign off. EMELY Burgess D.  [facility].

## 2020-10-30 NOTE — PROGRESS NOTES
Hospitalist Progress Note      Patient:  Letha Yanes    Unit/Bed:4K-10/010-A  YOB: 1970  MRN: [id number]   Acct: [de-identified]     PCP: Robert Lane MD  Date of Admission: 9/23/2020     Date of Service: Pt seen/examined on 10/30/20  and Admitted to Inpatient with expected LOS greater than two midnights due to medical therapy. Chief Complaint:  Shortness of Breath    Assessment and Plan:    1.) Sepsis due to Covid Pneumonia and Bacteremia: Initially resolved, now having intermittent fever again and today leukocytosis is worsening, WBC 15.1 from 11 yesterday. Complicated medical course. Recent discharge from [facility] for COVID-19 on 9/15/2020. Readmitted for COVID-19 pneumonia on 9/23/2020, intubation on 9/23/2020 and 10/6/2020. Successfully weaned to room air currently. Per ICU notes patient would be a good candidate for Zyvox, however due to patient's inability to swallow he has been unable to receive Zyvox. Has been treated with Doxycycline and Vancomycin (10/6-10/14), and Zosyn (10/6-10/16) for likely MRSA pneumonia. Patient began having low grade fevers on 10/21, with a temperature of 103.0F on 10/23. Was noted to be septic again with Fever, Tachycardia, and Tachypnea. ID was consulted and started Zosyn for a 7 day course and Vancomycin. Repeat Blood Cultures negative. UA negative. Possible sources were recurrent pneumonia, rectal wound, or PICC line. PICC line could not be removed due to inability to have an NG tube for nutrition. One Blood Culture returned (+) for Gram Positive Cocci in clusters, however there was no other culture to compare it against.  Respiratory Panel was negative. LA normal, ProCal 4.58. Following with ID for recommendations. Antibiotics planned to continue for one week starting on 10/28.   10/30: WBC stable    - Recheck with daily CBC    - Continue Antibiotics    - Consider redrawing ProCal to assess treatment progress.     2.) Acute hypoxic respiratory failure due to COVID-19 pneumonia:  Intubated on 10/6/2020, extubated on 10/15/2020. Was on room air until 10/24, when patient required 2L NC, was placed in ICU on 10/25 for Hypotension, and was started on BiPAP at that time. Began to wean on 10/28. Patient sating at 95% without the mask on 10/30.   10/30: Wear BiPAP overnight for KIARA    3.) KRISTY due to hypotension (Resolved):  Creatinine 1.1 on admission. Trended up to 4.1 on 10/7/2020. Baseline creatinine 0.6 to 0.8. Nephrology consulted. Started IVF hydration, Cr trended down to 1.5 on 10/24, and has remained stable around 1.3-1.5. Nephrology suggested this is a new baseline. 10/30: Cr 1.4 today    - Recheck with BMP in AM    4.) Hypernatremia, likely due to dehydration (Resolved): Off D5W, patient had episode of hypernatremia of 146 on 10/23. Started back on 0.45% NS as per Nephrology. TPN adjusted by Dietary on 10/27 to ensure no overload of electrolytes. 10/30: Recheck with BMP in AM    - Sodium 134 today    5.) Acute encephalopathy, etiology unclear, likely related to recent Covid 19 infection: 10/23 saw Worsening mentation today with fevers. Patient likely septic again due to unknown etiology. Infectious causes include rectal wound, pneumonia, PICC line infection. CT head on 10/20/2020 unremarkable. Patient slowly gaining better mentation, started being able to hold small conversations on 10/27. Still lethargic. 6.) Acute on chronic Normocytic anemia due to acute rectal bleeding- secondary to gluteal/rectal ulcerations vs hemodilutional from IVF, (Improving): Hemoglobin of 8.6 today. Baseline Hgb 7-8 in 10/2020, was around 11 in 9/2020. S/p 2 units PRBC. Blood clot seen by nursing staff today likely secondary to rectal ulcer. Patient does have rectal bleed from rectal ulcer from Flexi-Seal.  PRBCs given on 10/23, 10/25, 10/26, and 10/28 for a total of 6 units so far.   GI stated that supportive measures only would be preformed for the perianal ulcers. States increased PO feeds may worsen the ulcers   10/30: Continue to Monitor with CBC in AM    - Transfuse if <7    - GI started on Aranesp for anemia of chronic disease    7.) Hypokalemia due to hypomagnesemia and diarrhea, (Resolved): Replace per protocol, BMP in am .     8.) Hypomagnesemia likely due to poor oral intake and diarrhea, (Resolving): Replace per protocol, check magnesium level in am.     9.) Dysphagia, At risk for Malnutrition: Patient failed Fiberoptic Endoscopic Evaluation of the Swallow (FEES). ST recommend NPO, per ST NGT not an option right now due to failed FEES. Started TPN temporarily on 10/21. Followed dietary recommendation for Free Water addition. GI stated patient not a good candidate for PEG unless off of BiPAP, and father does not want a tracheostomy performed at this time. 10/30: Patient off BiPAP for longer periods during day      - Speech Therapy re-evaluated with Barium Swallow      - Swallow study showed no aspiration      - ST to attempt transition to solid food slowly     10.) Diarrhea (Resolved): C. diff test ordered by GI, but not done. May repeat if continue diarrhea.     11.) Saarhi-rectal lesion: Wound ostomy on-board, appreciate input. Cont zinc oxide as ordered.      12. ) Hx of KIARA: Noncompliant with CPAP at home, Pulmonology consulted. Use BiPAP at night. Settings: PEEP 6cm H2O              FiO2 30%              O2 flow rate 2 L/min     13.)  Diabetes mellitus type 2:  Blood sugars within goal range of 140-180. Continue Lantus at current dose and sliding scale insulin. Accu-Cheks q. 4 and at bedtime, Hypoglycemia protocol in place     14.)  Essential hypertension:  Uncontrolled on admission due to pt not receiving PO meds ( on amlodipine, cardizem and hydralazine PO at home). IV hydralazine prn ordered. Avoid hypotension with recent sepsis and renal failure.   SBP stable at 110     15.) Physical Deconditioning: Patient likely will need SNF versus LTAC at discharge. Faye evaluation consult ordered. Palliative care following in discussed case with father who primary decision-maker.     16.) Thrombocytosis, likely reactive due to recent COVID-19 (Resolved): , recheck with CBC in am     17.) Mild hypercalcemia, likely from dehydration (Resolved): Ca 9.0, Continue IVF per Nephrology. Recheck with daily BMP.     18.) Urinary retention: On mason cath, US negative for infection. Disposition Plan: TBD based on clinical course. Trying to get approval for [place]. History Of Present Illness:      Mr. Jaxson Allison is a  44year-old morbidly obese black male lifetime non-smoker. Yoselin Dee has a history of morbid obesity associated with type 2 diabetes mellitus, prior alcohol abuse, hyperlipidemia, hypertension, hypogonadism, nonalcoholic steatohepatitis, obstructive sleep apnea, and gout.  Patient was hospitalized 9/6/2020 through 9/15/2020 with hypoxemic respiratory failure secondary to COVID-19 associated with diffuse bilateral infiltrates.  At that time, patient received Decadron, remdesivir, and danazol.  During hospitalization, he had issues with atrial fibrillation.  His insulin therapy required adjustment.  He was discharged home on subcutaneous Lovenox. Patient returned back to the emergency room on 9/23/2020 with increasing SOB and progressive hypoxia. CXR showed diffuse infiltrates.  Deteriorated and required intubation. Patient underwent bronchoscopy on 9/27/2020 which demonstrated no mucus production. Patient extubated 10/2/2020.      Patient reintubated for progressive respiratory failure with progressive obtundation on 10/6/2020.  This was associated with acute oliguric renal failure.  Patient was intubated 10/6/2020, and underwent volume resuscitation.  Fractional excretion of sodium returned less than 1 which was consistent with prerenal azotemia.  After volume resuscitation, patient answer questions, however limited due to constant use of BiPAP. He was placed in ICU yesterday due to continued blood in his stools as well as   Hypotension. He required 1 unite PRBC this morning. He was not intubated overnight. A sigmoidoscopy was preformed and showed a large perianal ulcer extending into the anal canal.  GI stated there was nothing they could do to prevent re-bleeding at this point. 10/27: Patient able to answer questions better this morning, however he is still tired/lethargic and cannot stay awake for the entire exam.  He denies chest pain. He points to his mask and states that it is painful and itchy. The Night time nurse reports that he had 3 episodes of passing large clots per his rectum. He has been grabbing at the BiPAP mask and has been succesful in taking it off occasionally, but he desatruates and needs it placed back on. He hit is toe against the bedrail and ripped off his right toenail. The nurse kept it in a sample cup. His HgB is at 7.2 (From 7.6 the night prior). Will recheck HgB later today and transfuse if necsessary. GI states only conservative management can be done for the perianal ulcer at this time. They also state that the patient is not a good candidate for PEG tube placement unless he is off of BiPAP. The patient's father does not want to place a tracheostomy at this time. Patient till on TPN. 10/28: Patient able to answer some questions today. Still lethargic on exam.  He is still removing his BiPAP frequently, although his saturation drops below 90 when it is not on. There were no reported episodes of clots passed overnight. He had a BM that was mostly green in consistency. He required 1 unit of PRBC overnight, HgB currently stable at 7.9. Pre Cert was denied for [place], starting appeal process. Antibiotics to continue for another week. Nothing else can be done at this time except transfusions and antibiotics.   POA states no trach, which means that he will be unable to get a PEG tube. 10/29: Patient asleep for most of exam.  No episodes of Rectal Bleeding seen. Nurses state he is still trying to take the BiPAP off, but quickly desaturates. No units of PRBC had to be transfused. HgB increased to 8.4. Day 2 of appeal for [place]. No current change in treatment plans. 10/30: Patient asleep for most of exam, however he is able saturating well without the BiPAP. Will still need BiPAP at night for KIARA. No further transfusions of clots passed overnight. HgB stable. Day 3 of appeal for [place]. Since off of BiPAP, Speech Therapy has been consulted to assess dysphagia and ability to eat PO. A barium swallow shows no aspiration. ST states a 2 week long term goal to return patient to functional eating status. Will continue antibiotics. Past Medical History, Past Surgical History, Allergies, Medications, Social History, Family History reviewed in H&P and remain unchanged from admission. Diet:  PN-Adult  3 IN 1 Central Line (Custom)  DIET DYSPHAGIA MINCED AND MOIST; Carb Control: 3 carb choices (45 gms)/meal; No Added Salt (3-4 GM); No Drinking Straw  PN-Adult  3 IN 1 Central Line (Custom)    Review of Systems:      Patient slept through exam, and even on awakening to his name, he fell back asleep before being able to answer questions. Physical exam:    /82   Pulse 103   Temp 98 °F (36.7 °C) (Oral)   Resp 20   Ht 5' 8\" (1.727 m)   Wt 284 lb 6.4 oz (129 kg)   SpO2 98%   BMI 43.24 kg/m²     General appearance:  Laying in bed on side, Able to answer questions appropriately, Obese  HEENT:  Normal cephalic, atraumatic without obvious deformity. Pupils equal, round, and reactive to light. Conjunctivae/corneas clear. Neck: Supple, with full range of motion. No jugular venous distention. Trachea midline.   Respiratory:  BiPAP removed today, increased respiratory effort, coarse breath sounds, difficult to hear beyond noise from BiPAP machine. Cardiovascular:  Regular Rate and rhythm with normal S1/S2 without murmurs, rubs or gallops. Abdomen: Soft, non-tender, non-distended with normal bowel sounds. Musculoskeletal:  No clubbing or cyanosis, edema in lower limbs bilaterally. Full range of motion without deformity. Skin: Skin color, texture, turgor normal.  No rashes or lesions. Dry skin around borders of face  Neurologic:  Limited due to inability to follow commands/stay awake  Capillary Refill: Brisk,< 3 seconds   Peripheral Pulses: +2 palpable, equal bilaterally       Labs:     Recent Labs     10/27/20  2355 10/28/20  0920 10/29/20  0400   WBC  --  11.1* 11.2*   HGB 7.9* 7.9* 8.4*   HCT 24.7* 24.8* 26.8*   PLT  --  315 365     Recent Labs     10/28/20  0440 10/29/20  0400 10/30/20  0330    139 134*   K 5.0 5.1 4.6    98 94*   CO2 30 32 31   BUN 17 14 13   CREATININE 1.3* 1.4* 1.4*   CALCIUM 9.6 9.8 10.0   PHOS 3.9 4.0 3.5     Recent Labs     10/28/20  0440   AST 13   ALT 11   BILITOT 0.6   ALKPHOS 67     No results for input(s): INR in the last 72 hours. No results for input(s): Winford Grand in the last 72 hours. Urinalysis:      Lab Results   Component Value Date    NITRU NEGATIVE 10/06/2020    WBCUA > 200 10/06/2020    BACTERIA FEW 10/06/2020    RBCUA 5-10 10/06/2020    BLOODU LARGE 10/06/2020    SPECGRAV >=1.030 10/06/2020    GLUCOSEU NEGATIVE 08/05/2020       Intake & Output:  I/O last 3 completed shifts: In: 7152.8 [I.V.:7152.8]  Out: 3400 [Urine:3400]  No intake/output data recorded. Radiology:     CXR 10/23: I have reviewed the CXR with the following interpretation: Poor inflation of lungs, borderline heart size, no effusions, PICC line right with catheter tip in cavoatrial junction, mild infiltrate scattering in both lungs, worsened appearance from prior study    FL MODIFIED BARIUM SWALLOW W VIDEO   Final Result   1. Laryngeal penetration of thin barium without evidence of aspiration.    2. Additional recommendations from the speech therapist will follow. **This report has been created using voice recognition software. It may contain minor errors which are inherent in voice recognition technology. **      Final report electronically signed by Dr. Vanesa Woodard on 10/30/2020 11:25 AM      XR CHEST PORTABLE   Final Result   1. Poor inflation lungs. Borderline heart size. No effusion. 2. Right arm PICC line, catheter tip the cavoatrial junction. 3. Mild infiltrate scattered in both lungs and left infrahilar region. 4. Overall appearance slightly worsened from prior study. **This report has been created using voice recognition software. It may contain minor errors which are inherent in voice recognition technology. **      Final report electronically signed by Dr. Kristine Gates on 10/23/2020 10:46 AM      CT ABDOMEN PELVIS WO CONTRAST Additional Contrast? None   Final Result   1. Almost complete resolution of previously seen airspace infiltrates in the lung bases. 2. No acute abdominal or pelvic abnormalities. **This report has been created using voice recognition software. It may contain minor errors which are inherent in voice recognition technology. **      Final report electronically signed by Dr. Amparo Whalen on 10/20/2020 10:50 AM      CT HEAD WO CONTRAST   Final Result    No evidence of acute intracranial abnormality. **This report has been created using voice recognition software. It may contain minor errors which are inherent in voice recognition technology. **      Final report electronically signed by Dr. Bianca Flowers MD on 10/20/2020 10:13 AM      XR CHEST PORTABLE   Final Result   Ill-defined bilateral lung opacities. Follow-up as clinically indicated. This document has been electronically signed by:  Bib Julien MD on 10/20/2020 05:38 AM         XR CHEST PORTABLE   Final Result   Minimal residual atelectasis distal tip 1.8 cm above the level of the madi. 2. There is a new esophageal tube with the distal tip projecting over the gastric fundus. 3. There is a stable right PICC with the distal tip projecting over the right atrium. 4. The lung volumes are diminished. There are bilateral perihilar and the basilar opacities which are similar to the previous examination however may be accentuated by the expiratory technique. There is no pleural effusion. Follow-up chest radiographs    are recommended to confirm complete resolution. **This report has been created using voice recognition software. It may contain minor errors which are inherent in voice recognition technology. **      Final report electronically signed by Dr. Aloma Mcardle on 10/6/2020 7:18 AM      XR CHEST PORTABLE   Final Result   Bilateral lung opacities. Follow-up to clearing recommended. This document has been electronically signed by: Brenda Hill MD on 10/06/2020 06:09 AM         XR CHEST PORTABLE   Final Result   Slightly decreased diffuse patchy bilateral airspace disease. Support devices as above. This document has been electronically signed by: Tho Parekh MD on    10/03/2020 05:15 AM         XR CHEST PORTABLE   Final Result   ET tube should be retracted 1.5-2.0 cm. No significant change in coarse scattered bilateral infiltrates. This document has been electronically signed by: Terry Bernabe MD on    09/30/2020 06:50 AM         XR CHEST PORTABLE   Final Result      Slightly improving bilateral pneumonia. **This report has been created using voice recognition software. It may contain minor errors which are inherent in voice recognition technology. **      Final report electronically signed by Dr. Jessy Gongora on 9/27/2020 2:23 PM      XR ABDOMEN FOR NG/OG/NE TUBE PLACEMENT   Final Result   Esophageal route tube tip in the stomach.                **This report has been created using voice recognition software. It may contain minor errors which are inherent in voice recognition technology. **      Final report electronically signed by Dr Evans Srivastava on 9/26/2020 10:22 AM      XR CHEST PORTABLE   Final Result   1. Lines and tubes as above. 2. Worsening bilateral pneumonia. **This report has been created using voice recognition software. It may contain minor errors which are inherent in voice recognition technology. **      Final report electronically signed by Dr. Radha Wilson on 9/24/2020 7:38 AM      XR CHEST PORTABLE   Final Result   1. Endotracheal tube terminates 3.1 cm above the madi. 2.  Orogastric tube in the stomach. 3.  Patchy opacities in the right upper lobe and lingula. This document has been electronically signed by: Mihaela Alcantar MD on    09/24/2020 12:35 AM         XR CHEST PORTABLE   Final Result   1. Bilateral pulmonary opacification worse than on previous study dated 10 September 2020. This may represent worsening inflammatory process, possibly Covid 19 infection. Please correlate clinically   2. Borderline cardiomegaly. **This report has been created using voice recognition software. It may contain minor errors which are inherent in voice recognition technology. **      Final report electronically signed by DR Mariela Butterfield on 9/23/2020 10:47 AM           DVT prophylaxis: Lovenox, held for GI bleed, initiate SCDs, May consider adding in Lovenox again.     Code Status: Limited    PT/OT Eval Status: Not Consulted    Active Hospital Problems    Diagnosis Date Noted    Sepsis due to COVID-19 ([facility] [address] [U07.1, A41.89]     KRISTY (acute kidney injury) ([facility] [address] [N17.9]     Hypernatremia [E87.0]     Acute encephalopathy [G93.40]     Acute on chronic anemia [D64.9]     Hypomagnesemia [E83.42]     Dysphagia [R13.10]     Diarrhea [R19.7]     At risk for malnutrition [Z91.89]     Reactive thrombocytosis [R79.89]     Pneumonia due to COVID-19

## 2020-10-30 NOTE — PLAN OF CARE
Problem: Nutrition  Goal: Optimal nutrition therapy  10/30/2020 1032 by Willie Rodriguez RD, LD  Outcome: Ongoing   Nutrition Problem #1: Inadequate oral intake  Intervention: Food and/or Nutrient Delivery: Continue NPO, Continue Current Parenteral Nutrition(SLP for diet recommendations.)  Nutritional Goals: Patient will receive PN to meet 75% or more of estimated nutrient needs until able to initiate EN or oral diet.

## 2020-10-30 NOTE — PROGRESS NOTES
Pharmacy Vancomycin Consult     Vancomycin Day: 8  Current Dosin mg q24H    Temp max:  99.7    Recent Labs     10/29/20  0400 10/30/20  0330   BUN 14 13       Recent Labs     10/29/20  0400 10/30/20  0330   CREATININE 1.4* 1.4*       Recent Labs     10/28/20  0920 10/29/20  0400   WBC 11.1* 11.2*         Intake/Output Summary (Last 24 hours) at 10/30/2020 1551  Last data filed at 10/30/2020 1430  Gross per 24 hour   Intake 5943.19 ml   Output 3150 ml   Net 2793.19 ml       Cultures/Sensitivites  Date Source Result   10/12/2020 ETT MRSA   10/12/2020 PNA PCR panel negative   10/22/2020 BC1 Staph coag neg  [facility] -nothing detected        Ht Readings from Last 1 Encounters:   20 5' 8\" (1.727 m)        Wt Readings from Last 1 Encounters:   10/30/20 284 lb 6.4 oz (129 kg)         Body mass index is 43.24 kg/m². Estimated Creatinine Clearance: 81 mL/min (A) (based on SCr of 1.4 mg/dL (H)). Trough: 23.6    Assessment/Plan:  Discussed with RN to stop the vancomycin that was still running (about half of dose given ~900 mg). Will hold vancomycin orders at this time and recheck vancomycin level in the morning.     Kris Perez, PharmD, BCPS   10/30/2020  3:56 PM

## 2020-10-30 NOTE — PROGRESS NOTES
TPN Follow Up Note    Assessment: Diet advanced to dysphagia   Electrolyte Replacement: none    TPN changes for (today) at 1800:    Increased NaCl to 180 mEq    Re-check BMP, Mg, PO4, iCa 10/31/20 am    Cherelle Todd PharmD, BCPS   10/30/2020  12:23 PM

## 2020-10-31 LAB
ALLEN TEST: POSITIVE
ANION GAP SERPL CALCULATED.3IONS-SCNC: 13 MEQ/L (ref 8–16)
BASE EXCESS (CALCULATED): 4.7 MMOL/L (ref -2.5–2.5)
BUN BLDV-MCNC: 14 MG/DL (ref 7–22)
CALCIUM IONIZED: 1.19 MMOL/L (ref 1.12–1.32)
CALCIUM SERPL-MCNC: 10.8 MG/DL (ref 8.5–10.5)
CHLORIDE BLD-SCNC: 99 MEQ/L (ref 98–111)
CO2: 27 MEQ/L (ref 23–33)
COLLECTED BY:: 4648
CREAT SERPL-MCNC: 1.4 MG/DL (ref 0.4–1.2)
DEVICE: ABNORMAL
ERYTHROCYTE [DISTWIDTH] IN BLOOD BY AUTOMATED COUNT: 16.3 % (ref 11.5–14.5)
ERYTHROCYTE [DISTWIDTH] IN BLOOD BY AUTOMATED COUNT: 58.9 FL (ref 35–45)
GFR SERPL CREATININE-BSD FRML MDRD: 64 ML/MIN/1.73M2
GLUCOSE BLD-MCNC: 101 MG/DL (ref 70–108)
GLUCOSE BLD-MCNC: 113 MG/DL (ref 70–108)
GLUCOSE BLD-MCNC: 134 MG/DL (ref 70–108)
GLUCOSE BLD-MCNC: 149 MG/DL (ref 70–108)
GLUCOSE BLD-MCNC: 236 MG/DL (ref 70–108)
HCO3: 30 MMOL/L (ref 23–28)
HCT VFR BLD CALC: 31.8 % (ref 42–52)
HEMOGLOBIN: 9.4 GM/DL (ref 14–18)
IFIO2: 30
MAGNESIUM: 1.9 MG/DL (ref 1.6–2.4)
MCH RBC QN AUTO: 29.4 PG (ref 26–33)
MCHC RBC AUTO-ENTMCNC: 29.6 GM/DL (ref 32.2–35.5)
MCV RBC AUTO: 99.4 FL (ref 80–94)
MODE: ABNORMAL
O2 SATURATION: 99 %
PCO2: 46 MMHG (ref 35–45)
PH BLOOD GAS: 7.42 (ref 7.35–7.45)
PHOSPHORUS: 3.6 MG/DL (ref 2.4–4.7)
PLATELET # BLD: 421 THOU/MM3 (ref 130–400)
PMV BLD AUTO: 10.6 FL (ref 9.4–12.4)
PO2: 121 MMHG (ref 71–104)
POTASSIUM SERPL-SCNC: 4.3 MEQ/L (ref 3.5–5.2)
RBC # BLD: 3.2 MILL/MM3 (ref 4.7–6.1)
SET PEEP: 6 MMHG
SET PRESS SUPP: 16 CMH2O
SET RESPIRATORY RATE: 12 BPM
SODIUM BLD-SCNC: 139 MEQ/L (ref 135–145)
SOURCE, BLOOD GAS: ABNORMAL
VANCOMYCIN RANDOM: 22 UG/ML (ref 0.1–39.9)
WBC # BLD: 12 THOU/MM3 (ref 4.8–10.8)

## 2020-10-31 PROCEDURE — 94761 N-INVAS EAR/PLS OXIMETRY MLT: CPT

## 2020-10-31 PROCEDURE — 85027 COMPLETE CBC AUTOMATED: CPT

## 2020-10-31 PROCEDURE — 6360000002 HC RX W HCPCS: Performed by: FAMILY MEDICINE

## 2020-10-31 PROCEDURE — 6360000002 HC RX W HCPCS: Performed by: STUDENT IN AN ORGANIZED HEALTH CARE EDUCATION/TRAINING PROGRAM

## 2020-10-31 PROCEDURE — 94660 CPAP INITIATION&MGMT: CPT

## 2020-10-31 PROCEDURE — 82803 BLOOD GASES ANY COMBINATION: CPT

## 2020-10-31 PROCEDURE — 6370000000 HC RX 637 (ALT 250 FOR IP): Performed by: NURSE PRACTITIONER

## 2020-10-31 PROCEDURE — 2580000003 HC RX 258: Performed by: FAMILY MEDICINE

## 2020-10-31 PROCEDURE — 2580000003 HC RX 258: Performed by: STUDENT IN AN ORGANIZED HEALTH CARE EDUCATION/TRAINING PROGRAM

## 2020-10-31 PROCEDURE — 80048 BASIC METABOLIC PNL TOTAL CA: CPT

## 2020-10-31 PROCEDURE — 82330 ASSAY OF CALCIUM: CPT

## 2020-10-31 PROCEDURE — 94640 AIRWAY INHALATION TREATMENT: CPT

## 2020-10-31 PROCEDURE — 83735 ASSAY OF MAGNESIUM: CPT

## 2020-10-31 PROCEDURE — 82948 REAGENT STRIP/BLOOD GLUCOSE: CPT

## 2020-10-31 PROCEDURE — C9113 INJ PANTOPRAZOLE SODIUM, VIA: HCPCS | Performed by: FAMILY MEDICINE

## 2020-10-31 PROCEDURE — 36415 COLL VENOUS BLD VENIPUNCTURE: CPT

## 2020-10-31 PROCEDURE — 80202 ASSAY OF VANCOMYCIN: CPT

## 2020-10-31 PROCEDURE — 99232 SBSQ HOSP IP/OBS MODERATE 35: CPT | Performed by: INTERNAL MEDICINE

## 2020-10-31 PROCEDURE — 2060000000 HC ICU INTERMEDIATE R&B

## 2020-10-31 PROCEDURE — 84100 ASSAY OF PHOSPHORUS: CPT

## 2020-10-31 PROCEDURE — 36600 WITHDRAWAL OF ARTERIAL BLOOD: CPT

## 2020-10-31 RX ADMIN — SODIUM CHLORIDE, PRESERVATIVE FREE 10 ML: 5 INJECTION INTRAVENOUS at 21:36

## 2020-10-31 RX ADMIN — INSULIN LISPRO 4 UNITS: 100 INJECTION, SOLUTION INTRAVENOUS; SUBCUTANEOUS at 06:42

## 2020-10-31 RX ADMIN — FERROUS SULFATE TAB 325 MG (65 MG ELEMENTAL FE) 325 MG: 325 (65 FE) TAB at 15:23

## 2020-10-31 RX ADMIN — PIPERACILLIN AND TAZOBACTAM 3.38 G: 3; .375 INJECTION, POWDER, LYOPHILIZED, FOR SOLUTION INTRAVENOUS at 03:14

## 2020-10-31 RX ADMIN — PIPERACILLIN AND TAZOBACTAM 3.38 G: 3; .375 INJECTION, POWDER, LYOPHILIZED, FOR SOLUTION INTRAVENOUS at 21:38

## 2020-10-31 RX ADMIN — PANTOPRAZOLE SODIUM 40 MG: 40 INJECTION, POWDER, FOR SOLUTION INTRAVENOUS at 21:36

## 2020-10-31 RX ADMIN — PANTOPRAZOLE SODIUM 40 MG: 40 INJECTION, POWDER, FOR SOLUTION INTRAVENOUS at 09:22

## 2020-10-31 RX ADMIN — GLYCOPYRROLATE AND FORMOTEROL FUMARATE 2 PUFF: 9; 4.8 AEROSOL, METERED RESPIRATORY (INHALATION) at 07:57

## 2020-10-31 RX ADMIN — FERROUS SULFATE TAB 325 MG (65 MG ELEMENTAL FE) 325 MG: 325 (65 FE) TAB at 09:22

## 2020-10-31 RX ADMIN — PIPERACILLIN AND TAZOBACTAM 3.38 G: 3; .375 INJECTION, POWDER, LYOPHILIZED, FOR SOLUTION INTRAVENOUS at 11:44

## 2020-10-31 ASSESSMENT — PAIN SCALES - GENERAL
PAINLEVEL_OUTOF10: 0

## 2020-10-31 ASSESSMENT — PAIN SCALES - WONG BAKER
WONGBAKER_NUMERICALRESPONSE: 0

## 2020-10-31 NOTE — PROGRESS NOTES
Hospitalist Progress Note      Patient:  Brittny Alejandre    Unit/Bed:4K-10/010-A  YOB: 1970  MRN: [id number]   Acct: [de-identified]     PCP: Qiana Almeida MD  Date of Admission: 9/23/2020     Date of Service: Pt seen/examined on 10/31/20  and Admitted to Inpatient with expected LOS greater than two midnights due to medical therapy. Chief Complaint:  Shortness of Breath    Assessment and Plan:    1.) Sepsis due to Covid Pneumonia and Bacteremia: Initially resolved, now having intermittent fever again and today leukocytosis is worsening, WBC 15.1 from 11 yesterday. Complicated medical course. Recent discharge from [facility] for COVID-19 on 9/15/2020. Readmitted for COVID-19 pneumonia on 9/23/2020, intubation on 9/23/2020 and 10/6/2020. Successfully weaned to room air currently. Per ICU notes patient would be a good candidate for Zyvox, however due to patient's inability to swallow he has been unable to receive Zyvox. Has been treated with Doxycycline and Vancomycin (10/6-10/14), and Zosyn (10/6-10/16) for likely MRSA pneumonia. Patient began having low grade fevers on 10/21, with a temperature of 103.0F on 10/23. Was noted to be septic again with Fever, Tachycardia, and Tachypnea. ID was consulted and started Zosyn for a 7 day course and Vancomycin. Repeat Blood Cultures negative. UA negative. Possible sources were recurrent pneumonia, rectal wound, or PICC line. PICC line could not be removed due to inability to have an NG tube for nutrition. One Blood Culture returned (+) for Gram Positive Cocci in clusters, however there was no other culture to compare it against.  Respiratory Panel was negative. LA normal, ProCal 4.58. Following with ID for recommendations.   Antibiotics planned to continue for one week starting on 10/28.   10/31: WBC stable, currently 12.0    - Recheck with daily CBC    - Continue Antibiotics (Day 4/7)    - Consider redrawing ProCal to assess treatment progress. 2.) Acute hypoxic respiratory failure due to COVID-19 pneumonia:  Intubated on 10/6/2020, extubated on 10/15/2020. Was on room air until 10/24, when patient required 2L NC, was placed in ICU on 10/25 for Hypotension, and was started on BiPAP at that time. Began to wean on 10/28. Patient sating at 95% without the mask on 10/30.   10/31: Wear BiPAP overnight for KIARA    3.) KRISTY due to hypotension (Resolved):  Creatinine 1.1 on admission. Trended up to 4.1 on 10/7/2020. Baseline creatinine 0.6 to 0.8. Nephrology consulted. Started IVF hydration, Cr trended down to 1.5 on 10/24, and has remained stable around 1.3-1.5. Nephrology suggested this is a new baseline. 10/31: Recheck with BMP in AM    4.) Hypernatremia, likely due to dehydration (Resolved): Off D5W, patient had episode of hypernatremia of 146 on 10/23. Started back on 0.45% NS as per Nephrology. TPN adjusted by Dietary on 10/27 to ensure no overload of electrolytes. 10/31: Recheck with BMP in AM    - Dietary to reassess need without TPN    5.) Acute encephalopathy, etiology unclear, likely related to recent Covid 19 infection: 10/23 saw Worsening mentation today with fevers. Patient likely septic again due to unknown etiology. Infectious causes include rectal wound, pneumonia, PICC line infection. CT head on 10/20/2020 unremarkable. Patient slowly gaining better mentation, started being able to hold small conversations on 10/27. Still lethargic. 6.) Acute on chronic Normocytic anemia due to acute rectal bleeding- secondary to gluteal/rectal ulcerations vs hemodilutional from IVF, (Improving): Hemoglobin of 8.6 today. Baseline Hgb 7-8 in 10/2020, was around 11 in 9/2020. S/p 2 units PRBC. Blood clot seen by nursing staff today likely secondary to rectal ulcer. Patient does have rectal bleed from rectal ulcer from Flexi-Seal.  PRBCs given on 10/23, 10/25, 10/26, and 10/28 for a total of 6 units so far.   GI stated that supportive measures only would be preformed for the perianal ulcers. States increased PO feeds may worsen the ulcers. Aranesp started on 10/30 by GI for Anemia of Chronic Diseases. 10/31: Continue to Monitor with CBC in AM    - Transfuse if <7    7.) Hypokalemia due to hypomagnesemia and diarrhea, (Resolved): Replace per protocol, BMP in am .     8.) Hypomagnesemia likely due to poor oral intake and diarrhea, (Resolving): Replace per protocol, check magnesium level in am.     9.) Dysphagia, At risk for Malnutrition: Patient failed Fiberoptic Endoscopic Evaluation of the Swallow (FEES). ST recommend NPO, per ST NGT not an option right now due to failed FEES. Started TPN temporarily on 10/21. Followed dietary recommendation for Free Water addition. GI stated patient not a good candidate for PEG unless off of BiPAP, and father does not want a tracheostomy performed at this time. On 10/30, patient was without his BiPAP all day with good saturation. Speech Therapy reassessed with Barium Swallow and found no aspiration. Stated to wean TPN and transition to solid food. 10/31: TPN removed due to accidental PICC line removal      - Patient tolerating oral food, hold TPN at this time      - GI stated potential increase of rectal bleed during transition     10.) Diarrhea (Resolved): C. diff test ordered by GI, but not done. May repeat if continue diarrhea.     11.) Sarahi-rectal lesion: Wound ostomy on-board, appreciate input. Cont zinc oxide as ordered.      12. ) Hx of KIARA: Noncompliant with CPAP at home, Pulmonology consulted. Use BiPAP at night. Settings: PEEP 6cm H2O              FiO2 30%              O2 flow rate 2 L/min     13.)  Diabetes mellitus type 2:  Blood sugars within goal range of 140-180. Continue Lantus at current dose and sliding scale insulin.   Accu-Cheks q. 4 and at bedtime, Hypoglycemia protocol in place     14.)  Essential hypertension:  Uncontrolled on admission due to pt not receiving PO meds ( on amlodipine, cardizem and hydralazine PO at home). IV hydralazine prn ordered. Avoid hypotension with recent sepsis and renal failure. SBP stable at 110     15.) Physical Deconditioning:  Patient likely will need SNF versus LTAC at discharge. Soper evaluation consult ordered. Palliative care following in discussed case with father who primary decision-maker.     16.) Thrombocytosis, likely reactive due to recent COVID-19 (Resolved): , recheck with CBC in am     17.) Mild hypercalcemia, likely from dehydration (Resolved): Ca 9.0, Continue IVF per Nephrology. Recheck with daily BMP.     18.) Urinary retention: On mason cath, US negative for infection. Disposition Plan: [place] Appeal day 4. Patient is clinically much improved, consider ECF or Physical Rehab center. History Of Present Illness:      Mr. Devin Alejandro is a  44year-old morbidly obese black male lifetime non-smoker. Josiane Marquez has a history of morbid obesity associated with type 2 diabetes mellitus, prior alcohol abuse, hyperlipidemia, hypertension, hypogonadism, nonalcoholic steatohepatitis, obstructive sleep apnea, and gout.  Patient was hospitalized 9/6/2020 through 9/15/2020 with hypoxemic respiratory failure secondary to COVID-19 associated with diffuse bilateral infiltrates.  At that time, patient received Decadron, remdesivir, and danazol.  During hospitalization, he had issues with atrial fibrillation.  His insulin therapy required adjustment.  He was discharged home on subcutaneous Lovenox. Patient returned back to the emergency room on 9/23/2020 with increasing SOB and progressive hypoxia. CXR showed diffuse infiltrates.  Deteriorated and required intubation. Patient underwent bronchoscopy on 9/27/2020 which demonstrated no mucus production. Patient extubated 10/2/2020.      Patient reintubated for progressive respiratory failure with progressive obtundation on 10/6/2020.  This was associated with acute oliguric renal failure.  Patient was intubated 10/6/2020, and underwent volume resuscitation.  Fractional excretion of sodium returned less than 1 which was consistent with prerenal azotemia.  After volume resuscitation, patient demonstrated that he was hemoconcentrated with a drop of 2 g in the hemoglobin. With volume resuscitation, urine output did improve. After patient was intubated on 10/6/2020, he underwent pulmonary lavage which showed MRSA on the PCR but no other organism.  However, patient was found to have greater than 200 white blood cells in the urine.  Patient was treated with Zosyn and doxycycline. Previously, patient had received vancomycin and developed fever while on vancomycin. Therefore vancomycin was not continued.  Sputum subsequently grew staph aureus.  Zosyn was discontinued but doxycycline continued on 10/14/2020. Patient did well with spontaneous breathing trial and was extubated 10/15/2020.     10/21: Weaned to room air. Respiratory status remained stable. Spiked fever this morning at 100.4 °F.  Possibly due to rectal wound due to Flexi-Seal.  Restarted on IV Zosyn, ID reconsulted. Patient would be a good candidate for SNF versus LTACH at discharge     10/22: Doing well on room air today. Afebrile. Continue Zosyn for 5 to 7 days. Faye evaluation. Continue zinc oxide for rectal wound. Blood pressure is elevated today, IV hydralazine for BP greater than 160     10/23: Recurrent fevers up to 103.0 °F today. Lactic acid ordered and normal.  Restarted vancomycin per ID. Possible PICC line infection versus worsening pneumonia. IV fluid bolus given. Patient tachypneic and tachycardic this morning, improved post fluid administration. 10/24: Patient lethargic and only responds to name. He is unable to stay awake for a complete exam.  He was placed on BiPAP overnight. He did not have a fever overnight. Patient was restarted on Vancomycin yesterday per ID.   Patient had mild tachypnea early in the night, however this resolved by morning. The Patient was normocardic throughout the night. Following ID for advice and recommendations. 10/26: Patient more awake today than previous. He is able to answer questions, however limited due to constant use of BiPAP. He was placed in ICU yesterday due to continued blood in his stools as well as   Hypotension. He required 1 unite PRBC this morning. He was not intubated overnight. A sigmoidoscopy was preformed and showed a large perianal ulcer extending into the anal canal.  GI stated there was nothing they could do to prevent re-bleeding at this point. 10/27: Patient able to answer questions better this morning, however he is still tired/lethargic and cannot stay awake for the entire exam.  He denies chest pain. He points to his mask and states that it is painful and itchy. The Night time nurse reports that he had 3 episodes of passing large clots per his rectum. He has been grabbing at the BiPAP mask and has been succesful in taking it off occasionally, but he desatruates and needs it placed back on. He hit is toe against the bedrail and ripped off his right toenail. The nurse kept it in a sample cup. His HgB is at 7.2 (From 7.6 the night prior). Will recheck HgB later today and transfuse if necsessary. GI states only conservative management can be done for the perianal ulcer at this time. They also state that the patient is not a good candidate for PEG tube placement unless he is off of BiPAP. The patient's father does not want to place a tracheostomy at this time. Patient till on TPN. 10/28: Patient able to answer some questions today. Still lethargic on exam.  He is still removing his BiPAP frequently, although his saturation drops below 90 when it is not on. There were no reported episodes of clots passed overnight. He had a BM that was mostly green in consistency.   He required 1 unit of PRBC overnight, HgB currently stable at 7.9. Pre Cert was denied for Faye, starting appeal process. Antibiotics to continue for another week. Nothing else can be done at this time except transfusions and antibiotics. POA states no trach, which means that he will be unable to get a PEG tube. 10/29: Patient asleep for most of exam.  No episodes of Rectal Bleeding seen. Nurses state he is still trying to take the BiPAP off, but quickly desaturates. No units of PRBC had to be transfused. HgB increased to 8.4. Day 2 of appeal for Faye. No current change in treatment plans. 10/30: Patient asleep for most of exam, however he is able saturating well without the BiPAP. Will still need BiPAP at night for KIARA. No further transfusions of clots passed overnight. HgB stable. Day 3 of appeal for Faye. Since off of BiPAP, Speech Therapy has been consulted to assess dysphagia and ability to eat PO. A barium swallow shows no aspiration. ST states a 2 week long term goal to return patient to functional eating status. Will continue antibiotics. 10/31: Overnight event reported, Patient reported to have fallen out of bed around 0620. The fall was unwitnessed and he was found on the ground face down. He stated no loss of consciousness, and denied hitting his head. The PICC line was pulled out during the incident. He was helped back into bed, and reported no headaches, neck pain, or back pain. There are were no visible injuries. Patient states he is feeling well this morning and is saturating well without the BiPAP. He states that he \"tripped\" out of bed in the morning, but did not hit his head. He denies headaches, chest pain, shortness of breath, nausea, vomiting, diarrhea, neck pain, or back pain. He is able to eat solid food, and TPN is now on hold due to no IV access. Will order an IV line to be placed, continue antibiotics.     Past Medical History, Past Surgical History, Allergies, Medications, Social History, Family History reviewed in H&P and remain unchanged from admission. Diet:  DIET DYSPHAGIA MINCED AND MOIST; Carb Control: 3 carb choices (45 gms)/meal; No Added Salt (3-4 GM); No Drinking Straw  PN-Adult  3 IN 1 Central Line (Custom)    Review of Systems:      Review of Systems - General ROS: negative for - chills, fatigue or fever  Respiratory ROS: no cough, shortness of breath, or wheezing  Cardiovascular ROS: no chest pain or dyspnea on exertion  Gastrointestinal ROS: no abdominal pain, change in bowel habits, or black or bloody stools  Genito-Urinary ROS: no dysuria, trouble voiding, or hematuria  Musculoskeletal ROS: negative for - joint stiffness, joint swelling, muscle pain or muscular weakness  Neurological ROS: negative for - confusion, dizziness, headaches, seizures or tremors  Dermatological ROS: negative for - pruritus, rash or skin lesion changes    Physical exam:    /82   Pulse 104   Temp 98.1 °F (36.7 °C) (Oral)   Resp 18   Ht 5' 8\" (1.727 m)   Wt 294 lb 3.2 oz (133.4 kg)   SpO2 95%   BMI 44.73 kg/m²     General appearance:  Laying in bed on side, Able to answer questions appropriately, Obese  HEENT:  Normal cephalic, atraumatic without obvious deformity. Pupils equal, round, and reactive to light. Conjunctivae/corneas clear. Neck: Supple, with full range of motion. No jugular venous distention. Trachea midline. Respiratory:  BiPAP removed today, increased respiratory effort, coarse breath sounds, difficult to hear beyond noise from BiPAP machine. Cardiovascular:  Regular Rate and rhythm with normal S1/S2 without murmurs, rubs or gallops. Abdomen: Soft, non-tender, non-distended with normal bowel sounds. Musculoskeletal:  No clubbing or cyanosis, mild edema in lower limbs bilaterally. Full range of motion without deformity. Skin: Skin color, texture, turgor normal.  No rashes or lesions.   Dry skin around borders of face  Neurologic:  Limited due to inability to follow commands/stay awake  Capillary Refill: Brisk,< 3 seconds   Peripheral Pulses: +2 palpable, equal bilaterally       Labs:     Recent Labs     10/29/20  0400 10/31/20  0929   WBC 11.2* 12.0*   HGB 8.4* 9.4*   HCT 26.8* 31.8*    421*     Recent Labs     10/29/20  0400 10/30/20  0330    134*   K 5.1 4.6   CL 98 94*   CO2 32 31   BUN 14 13   CREATININE 1.4* 1.4*   CALCIUM 9.8 10.0   PHOS 4.0 3.5     No results for input(s): AST, ALT, BILIDIR, BILITOT, ALKPHOS in the last 72 hours. No results for input(s): INR in the last 72 hours. No results for input(s): Les Darlene in the last 72 hours. Urinalysis:      Lab Results   Component Value Date    NITRU NEGATIVE 10/06/2020    WBCUA > 200 10/06/2020    BACTERIA FEW 10/06/2020    RBCUA 5-10 10/06/2020    BLOODU LARGE 10/06/2020    SPECGRAV >=1.030 10/06/2020    GLUCOSEU NEGATIVE 08/05/2020       Intake & Output:  I/O last 3 completed shifts: In: 2734.7 [I.V.:1745.7]  Out: 2350 [Urine:2350]  No intake/output data recorded. Radiology:     CXR 10/23: I have reviewed the CXR with the following interpretation: Poor inflation of lungs, borderline heart size, no effusions, PICC line right with catheter tip in cavoatrial junction, mild infiltrate scattering in both lungs, worsened appearance from prior study    FL MODIFIED BARIUM SWALLOW W VIDEO   Final Result   1. Laryngeal penetration of thin barium without evidence of aspiration. 2. Additional recommendations from the speech therapist will follow. **This report has been created using voice recognition software. It may contain minor errors which are inherent in voice recognition technology. **      Final report electronically signed by Dr. Leticia Triplett on 10/30/2020 11:25 AM      XR CHEST PORTABLE   Final Result   1. Poor inflation lungs. Borderline heart size. No effusion. 2. Right arm PICC line, catheter tip the cavoatrial junction.    3. Mild infiltrate scattered in both lungs and left infrahilar region. 4. Overall appearance slightly worsened from prior study. **This report has been created using voice recognition software. It may contain minor errors which are inherent in voice recognition technology. **      Final report electronically signed by Dr. Jennyfer Espinosa on 10/23/2020 10:46 AM      CT ABDOMEN PELVIS WO CONTRAST Additional Contrast? None   Final Result   1. Almost complete resolution of previously seen airspace infiltrates in the lung bases. 2. No acute abdominal or pelvic abnormalities. **This report has been created using voice recognition software. It may contain minor errors which are inherent in voice recognition technology. **      Final report electronically signed by Dr. Dahiana King on 10/20/2020 10:50 AM      CT HEAD WO CONTRAST   Final Result    No evidence of acute intracranial abnormality. **This report has been created using voice recognition software. It may contain minor errors which are inherent in voice recognition technology. **      Final report electronically signed by Dr. Shira Lemus MD on 10/20/2020 10:13 AM      XR CHEST PORTABLE   Final Result   Ill-defined bilateral lung opacities. Follow-up as clinically indicated. This document has been electronically signed by: Aaliyah Gracia MD on 10/20/2020 05:38 AM         XR CHEST PORTABLE   Final Result   Minimal residual atelectasis and/or infiltrate within the bilateral mid    and lower lungs with improvement. Improved pulmonary inflation. This document has been electronically signed by: Josephine Rogers MD on    10/16/2020 02:02 AM         XR CHEST PORTABLE   Final Result   Impression:   Progressive bilateral consolidations or ARDS. This document has been electronically signed by: Adrianne Baez MD on    10/12/2020 04:59 AM         XR CHEST PORTABLE   Final Result    Similar bilateral ill-defined pneumonia.       This document has been electronically signed by: Shellie Armas. Petar Jiménez DO on    10/11/2020 09:49 AM         XR CHEST PORTABLE   Final Result   Similar diffuse patchy bilateral airspace disease and consolidations. Support devices as above. This document has been electronically signed by: Kailee Cantu MD on    10/10/2020 04:35 AM         XR CHEST PORTABLE   Final Result   No acute findings. This document has been electronically signed by: Uziel Lieberman MD on 10/08/2020 07:40 AM         CT ABDOMEN PELVIS WO CONTRAST Additional Contrast? Oral   Final Result    IMPRESSION:   Bilateral lower lobe pneumonia. Known Covid. Continued progress imaging is advised   Distended colon with fluid, air and stool. Correlate for diarrhea. **This report has been created using voice recognition software. It may contain minor errors which are inherent in voice recognition technology. **      Final report electronically signed by Dr. Drema Epley on 10/7/2020 6:49 PM      XR CHEST PORTABLE   Final Result   Bilateral lung consolidation not significant change. This document has been electronically signed by: Uziel Lieberman MD on 10/07/2020 07:25 AM         US RENAL COMPLETE   Final Result   Unremarkable kidneys. This document has been electronically signed by: Ivey Collet, MD on    10/07/2020 01:48 AM         XR CHEST PORTABLE   Final Result   1. There is a new endotracheal tube with the distal tip 1.8 cm above the level of the madi. 2. There is a new esophageal tube with the distal tip projecting over the gastric fundus. 3. There is a stable right PICC with the distal tip projecting over the right atrium. 4. The lung volumes are diminished. There are bilateral perihilar and the basilar opacities which are similar to the previous examination however may be accentuated by the expiratory technique. There is no pleural effusion.  Follow-up chest radiographs    are recommended to confirm Jasbir Medal on 9/24/2020 7:38 AM      XR CHEST PORTABLE   Final Result   1. Endotracheal tube terminates 3.1 cm above the madi. 2.  Orogastric tube in the stomach. 3.  Patchy opacities in the right upper lobe and lingula. This document has been electronically signed by: Horacio Haile MD on    09/24/2020 12:35 AM         XR CHEST PORTABLE   Final Result   1. Bilateral pulmonary opacification worse than on previous study dated 10 September 2020. This may represent worsening inflammatory process, possibly Covid 19 infection. Please correlate clinically   2. Borderline cardiomegaly. **This report has been created using voice recognition software. It may contain minor errors which are inherent in voice recognition technology. **      Final report electronically signed by DR Micah Hogue on 9/23/2020 10:47 AM           DVT prophylaxis: Lovenox, held for GI bleed, initiate SCDs, May consider adding in Lovenox again. Code Status: Limited    PT/OT Eval Status: Not Consulted    Active Hospital Problems    Diagnosis Date Noted    Sepsis due to COVID-19 ([address] [U07.1, A41.89]     KRISTY (acute kidney injury) ([address] [N17.9]     Hypernatremia [E87.0]     Acute encephalopathy [G93.40]     Acute on chronic anemia [D64.9]     Hypomagnesemia [E83.42]     Dysphagia [R13.10]     Diarrhea [R19.7]     At risk for malnutrition [Z91.89]     Reactive thrombocytosis [R79.89]     Pneumonia due to COVID-19 virus [U07.1, J12.89] 10/18/2020    ARF (acute renal failure) with tubular necrosis (HCC) [N17.0]     Hypokalemia [E87.6]     Other hypotension [I95.89]     Acute respiratory failure with hypoxia ([address] [J96.01]     COVID-19 virus infection [U07.1] 09/06/2020    Essential hypertension [I10]        Thank you Arlene Jalloh MD for the opportunity to be involved in this patient's care.     Electronically signed by Eliana Marques DO on 10/31/2020 at 9:53 AM

## 2020-10-31 NOTE — PROGRESS NOTES
Hospitalist states there is no plan at this time to replace PICC line or restart TPN as long as oral intake is adequate.

## 2020-10-31 NOTE — PROGRESS NOTES
Pharmacy Vancomycin Consult     Vancomycin Day: 9  Current Dosing: on hold    Temp max:  99.5    Recent Labs     10/30/20  0330 10/31/20  0929   BUN 13 14       Recent Labs     10/30/20  0330 10/31/20  0929   CREATININE 1.4* 1.4*       Recent Labs     10/29/20  0400 10/31/20  0929   WBC 11.2* 12.0*         Intake/Output Summary (Last 24 hours) at 10/31/2020 1455  Last data filed at 10/31/2020 0591  Gross per 24 hour   Intake 1291.7 ml   Output 1100 ml   Net 191.7 ml       Cultures/Sensitivites  Date Source Result   10/12/2020 ETT MRSA   10/12/2020 PNA PCR panel negative   10/22/2020 BC1 Staph coag neg  [facility] -nothing detected       Ht Readings from Last 1 Encounters:   09/23/20 5' 8\" (1.727 m)        Wt Readings from Last 1 Encounters:   10/31/20 294 lb 3.2 oz (133.4 kg)         Body mass index is 44.73 kg/m². Estimated Creatinine Clearance: 82 mL/min (A) (based on SCr of 1.4 mg/dL (H)).     Random level: 22    Assessment/Plan:  Vancomycin level remains elevated today, will continue to hold vancomycin and recheck another level tomorrow am.     Yun Irene, PharmD, BCPS   10/31/2020  2:57 PM

## 2020-10-31 NOTE — PLAN OF CARE
Problem: Falls - Risk of:  Goal: Will remain free from falls  Description: Will remain free from falls  Outcome: Ongoing  Note: Patient was found on the floor face down this morning. Patient was assisted back into bed. Patient had no changed from previous assessments. Provider was notified of the incident.

## 2020-10-31 NOTE — PROGRESS NOTES
Patient was found on the floor face down by [facility] RN. This RN was called into the room. Patient's PICC line was removed at some point before a nurse entered the room. Patient was in a \"big boy\" bed before the fall and a tech was in the patient room approximately a half hour before the patient was found, and they were resting comfortably in bed, denied any needs. Patient stated they were \"okay. \" Patient denied any pain. Patient was oriented to year and place, which was the same as previous assessments. Vital Signs were obtained and were stable. Provider was notified of the incident. Patient was assisted back into bed and repositioned. No new skin breakdown was noted. Patient denied any numbness or tingling. No changes from previous assessments. Patient denies any needs at this time. Patient educated on the importance of calling for assistance by using the call light.

## 2020-11-01 ENCOUNTER — APPOINTMENT (OUTPATIENT)
Dept: GENERAL RADIOLOGY | Age: 50
DRG: 870 | End: 2020-11-01
Payer: MEDICARE

## 2020-11-01 LAB
ALLEN TEST: POSITIVE
ANION GAP SERPL CALCULATED.3IONS-SCNC: 14 MEQ/L (ref 8–16)
BASE EXCESS (CALCULATED): 5.6 MMOL/L (ref -2.5–2.5)
BUN BLDV-MCNC: 14 MG/DL (ref 7–22)
CALCIUM SERPL-MCNC: 10.5 MG/DL (ref 8.5–10.5)
CHLORIDE BLD-SCNC: 100 MEQ/L (ref 98–111)
CO2: 25 MEQ/L (ref 23–33)
COLLECTED BY:: ABNORMAL
CREAT SERPL-MCNC: 1.5 MG/DL (ref 0.4–1.2)
DEVICE: ABNORMAL
ERYTHROCYTE [DISTWIDTH] IN BLOOD BY AUTOMATED COUNT: 17.1 % (ref 11.5–14.5)
ERYTHROCYTE [DISTWIDTH] IN BLOOD BY AUTOMATED COUNT: 58.7 FL (ref 35–45)
GFR SERPL CREATININE-BSD FRML MDRD: 59 ML/MIN/1.73M2
GLUCOSE BLD-MCNC: 114 MG/DL (ref 70–108)
GLUCOSE BLD-MCNC: 114 MG/DL (ref 70–108)
GLUCOSE BLD-MCNC: 118 MG/DL (ref 70–108)
GLUCOSE BLD-MCNC: 118 MG/DL (ref 70–108)
GLUCOSE BLD-MCNC: 123 MG/DL (ref 70–108)
GLUCOSE BLD-MCNC: 126 MG/DL (ref 70–108)
HCO3: 31 MMOL/L (ref 23–28)
HCT VFR BLD CALC: 30.3 % (ref 42–52)
HEMOGLOBIN: 9.2 GM/DL (ref 14–18)
IFIO2: 30
MCH RBC QN AUTO: 29.5 PG (ref 26–33)
MCHC RBC AUTO-ENTMCNC: 30.4 GM/DL (ref 32.2–35.5)
MCV RBC AUTO: 97.1 FL (ref 80–94)
O2 SATURATION: 99 %
PCO2: 47 MMHG (ref 35–45)
PH BLOOD GAS: 7.42 (ref 7.35–7.45)
PLATELET # BLD: 419 THOU/MM3 (ref 130–400)
PMV BLD AUTO: 10.8 FL (ref 9.4–12.4)
PO2: 132 MMHG (ref 71–104)
POTASSIUM SERPL-SCNC: 4.3 MEQ/L (ref 3.5–5.2)
RBC # BLD: 3.12 MILL/MM3 (ref 4.7–6.1)
SET PEEP: 6 MMHG
SET PRESS SUPP: 22 CMH2O
SET RESPIRATORY RATE: 12 BPM
SODIUM BLD-SCNC: 139 MEQ/L (ref 135–145)
SOURCE, BLOOD GAS: ABNORMAL
VANCOMYCIN RANDOM: 15.2 UG/ML (ref 0.1–39.9)
WBC # BLD: 9.5 THOU/MM3 (ref 4.8–10.8)

## 2020-11-01 PROCEDURE — 6370000000 HC RX 637 (ALT 250 FOR IP): Performed by: NURSE PRACTITIONER

## 2020-11-01 PROCEDURE — 6360000002 HC RX W HCPCS: Performed by: STUDENT IN AN ORGANIZED HEALTH CARE EDUCATION/TRAINING PROGRAM

## 2020-11-01 PROCEDURE — 82803 BLOOD GASES ANY COMBINATION: CPT

## 2020-11-01 PROCEDURE — 94660 CPAP INITIATION&MGMT: CPT

## 2020-11-01 PROCEDURE — 2580000003 HC RX 258: Performed by: PHARMACIST

## 2020-11-01 PROCEDURE — 6360000002 HC RX W HCPCS: Performed by: PHARMACIST

## 2020-11-01 PROCEDURE — 71045 X-RAY EXAM CHEST 1 VIEW: CPT

## 2020-11-01 PROCEDURE — C9113 INJ PANTOPRAZOLE SODIUM, VIA: HCPCS | Performed by: FAMILY MEDICINE

## 2020-11-01 PROCEDURE — 85027 COMPLETE CBC AUTOMATED: CPT

## 2020-11-01 PROCEDURE — 2580000003 HC RX 258: Performed by: FAMILY MEDICINE

## 2020-11-01 PROCEDURE — 2060000000 HC ICU INTERMEDIATE R&B

## 2020-11-01 PROCEDURE — 36415 COLL VENOUS BLD VENIPUNCTURE: CPT

## 2020-11-01 PROCEDURE — 6360000002 HC RX W HCPCS: Performed by: FAMILY MEDICINE

## 2020-11-01 PROCEDURE — 2700000000 HC OXYGEN THERAPY PER DAY

## 2020-11-01 PROCEDURE — 6370000000 HC RX 637 (ALT 250 FOR IP): Performed by: HOSPITALIST

## 2020-11-01 PROCEDURE — 80202 ASSAY OF VANCOMYCIN: CPT

## 2020-11-01 PROCEDURE — 94760 N-INVAS EAR/PLS OXIMETRY 1: CPT

## 2020-11-01 PROCEDURE — 82948 REAGENT STRIP/BLOOD GLUCOSE: CPT

## 2020-11-01 PROCEDURE — 99232 SBSQ HOSP IP/OBS MODERATE 35: CPT | Performed by: INTERNAL MEDICINE

## 2020-11-01 PROCEDURE — 2580000003 HC RX 258: Performed by: STUDENT IN AN ORGANIZED HEALTH CARE EDUCATION/TRAINING PROGRAM

## 2020-11-01 PROCEDURE — 80048 BASIC METABOLIC PNL TOTAL CA: CPT

## 2020-11-01 PROCEDURE — 94640 AIRWAY INHALATION TREATMENT: CPT

## 2020-11-01 RX ADMIN — ACETAMINOPHEN 650 MG: 325 TABLET ORAL at 11:13

## 2020-11-01 RX ADMIN — VANCOMYCIN HYDROCHLORIDE 1500 MG: 5 INJECTION, POWDER, LYOPHILIZED, FOR SOLUTION INTRAVENOUS at 10:11

## 2020-11-01 RX ADMIN — SODIUM CHLORIDE, PRESERVATIVE FREE 10 ML: 5 INJECTION INTRAVENOUS at 20:15

## 2020-11-01 RX ADMIN — PIPERACILLIN AND TAZOBACTAM 3.38 G: 3; .375 INJECTION, POWDER, LYOPHILIZED, FOR SOLUTION INTRAVENOUS at 03:11

## 2020-11-01 RX ADMIN — GLYCOPYRROLATE AND FORMOTEROL FUMARATE 2 PUFF: 9; 4.8 AEROSOL, METERED RESPIRATORY (INHALATION) at 21:00

## 2020-11-01 RX ADMIN — PANTOPRAZOLE SODIUM 40 MG: 40 INJECTION, POWDER, FOR SOLUTION INTRAVENOUS at 20:15

## 2020-11-01 RX ADMIN — PIPERACILLIN AND TAZOBACTAM 3.38 G: 3; .375 INJECTION, POWDER, LYOPHILIZED, FOR SOLUTION INTRAVENOUS at 12:26

## 2020-11-01 RX ADMIN — GLYCOPYRROLATE AND FORMOTEROL FUMARATE 2 PUFF: 9; 4.8 AEROSOL, METERED RESPIRATORY (INHALATION) at 09:20

## 2020-11-01 RX ADMIN — SODIUM CHLORIDE, PRESERVATIVE FREE 10 ML: 5 INJECTION INTRAVENOUS at 07:41

## 2020-11-01 RX ADMIN — ACETAMINOPHEN 650 MG: 325 TABLET ORAL at 23:12

## 2020-11-01 RX ADMIN — FERROUS SULFATE TAB 325 MG (65 MG ELEMENTAL FE) 325 MG: 325 (65 FE) TAB at 07:41

## 2020-11-01 RX ADMIN — PANTOPRAZOLE SODIUM 40 MG: 40 INJECTION, POWDER, FOR SOLUTION INTRAVENOUS at 07:41

## 2020-11-01 RX ADMIN — PIPERACILLIN AND TAZOBACTAM 3.38 G: 3; .375 INJECTION, POWDER, LYOPHILIZED, FOR SOLUTION INTRAVENOUS at 20:15

## 2020-11-01 ASSESSMENT — PAIN SCALES - GENERAL
PAINLEVEL_OUTOF10: 0
PAINLEVEL_OUTOF10: 3
PAINLEVEL_OUTOF10: 0
PAINLEVEL_OUTOF10: 0

## 2020-11-01 ASSESSMENT — PAIN SCALES - WONG BAKER: WONGBAKER_NUMERICALRESPONSE: 0

## 2020-11-01 NOTE — PROGRESS NOTES
Progress note: Infectious diseases    Patient - Teri Herrera,  Age - 44 y.o.    - 1970      Room Number - 4K-10/010-A   MRN -  [id number]   Acct # - [de-identified]  Date of Admission -  2020  9:02 AM    SUBJECTIVE:   No new issues. He is on BIPAP  OBJECTIVE   VITALS    height is 5' 8\" (1.727 m) and weight is 294 lb 1.6 oz (133.4 kg). His oral temperature is 100.6 °F (38.1 °C). His blood pressure is 138/83 and his pulse is 85. His respiration is 18 and oxygen saturation is 98%. Wt Readings from Last 3 Encounters:   20 294 lb 1.6 oz (133.4 kg)   09/15/20 281 lb 6.4 oz (127.6 kg)   20 (!) 306 lb 6.4 oz (139 kg)       I/O (24 Hours)    Intake/Output Summary (Last 24 hours) at 2020 1214  Last data filed at 2020 0302  Gross per 24 hour   Intake 518.68 ml   Output 1375 ml   Net -856.32 ml       General Appearance awake and oriented,    HEENT - normocephalic, atraumatic, pale  conjunctiva,  anicteric sclera    Neck - Supple, no mass  Lungs -  Bilateral   air entry, diminished breath sound  Cardiovascular - Heart sounds are normal.    Abdomen - soft, not distended, nontender,   Neurologic awake, answers questions  Skin - No bruising or bleeding  Extremities - chronic leg edema, perianal open wound. mason in place.     MEDICATIONS:      vancomycin  1,500 mg Intravenous Q36H    darbepoetin alejandro-polysorbate  60 mcg Subcutaneous Weekly - Thursday    pantoprazole  40 mg Intravenous BID    vancomycin (VANCOCIN) intermittent dosing (placeholder)   Other RX Placeholder    sodium chloride flush  10 mL Intravenous 2 times per day    ferrous sulfate  325 mg Oral BID     insulin lispro  0-12 Units Subcutaneous Q6H    piperacillin-tazobactam  3.375 g Intravenous Q8H    [Held by provider] gabapentin  400 mg Oral BID    modafinil  100 mg Oral Daily    [Held by provider] enoxaparin  40 mg Subcutaneous BID    insulin glargine  38 Units Subcutaneous Nightly    lidocaine 1 % injection  5 mL Intradermal Once    magnesium replacement protocol   Other RX Placeholder    phosphorus replacement protocol   Other RX Placeholder    calcium replacement protocol   Other RX Placeholder    [Held by provider] ARIPiprazole  15 mg Oral Daily    [Held by provider] aspirin  81 mg Oral Daily    glycopyrrolate-formoterol  2 puff Inhalation BID      sodium chloride      dextrose       sodium chloride flush, hydrALAZINE, acetaminophen, potassium chloride **OR** potassium alternative oral replacement **OR** potassium chloride, potassium chloride, lidocaine, acetaminophen **OR** [DISCONTINUED] acetaminophen, polyethylene glycol, promethazine **OR** ondansetron, albuterol, glucose, dextrose, glucagon (rDNA), dextrose      LABS:     CBC:   Recent Labs     10/31/20  0929 11/01/20  0550   WBC 12.0* 9.5   HGB 9.4* 9.2*   * 419*     BMP:    Recent Labs     10/30/20  0330 10/31/20  0929 11/01/20  0550   * 139 139   K 4.6 4.3 4.3   CL 94* 99 100   CO2 31 27 25   BUN 13 14 14   CREATININE 1.4* 1.4* 1.5*   GLUCOSE 170* 149* 126*     Calcium:  Recent Labs     11/01/20  0550   CALCIUM 10.5     Ionized Calcium:No results for input(s): IONCA in the last 72 hours. Magnesium:  Recent Labs     10/31/20  0929   MG 1.9     Phosphorus:  Recent Labs     10/31/20  0929   PHOS 3.6     BNP:No results for input(s): BNP in the last 72 hours. Glucose:  Recent Labs     11/01/20  0025 11/01/20  0519 11/01/20  1110   POCGLU 114* 118* 123*         Problem list of patient:     Patient Active Problem List   Diagnosis Code    Chronic diastolic congestive heart failure (HCC) I50.32    Atrial fibrillation (HCC) I48.91    BPH (benign prostatic hyperplasia) N40.0    Carpal tunnel syndrome on right G56.01    Chronic gout M1A. 9XX0    DM2 (diabetes mellitus, type 2) ([facility] [address] E11.9    Heart failure with preserved ejection fraction ([facility] [address] I50.30    History of alcohol abuse F10.11    History of osteomyelitis Z87.39    Essential hypertension I10    Hypogonadism, male E29.1    Major depression F32.9    Morbid obesity (HCC) E66.01    DURAN (nonalcoholic steatohepatitis) K75.81    KIARA (obstructive sleep apnea) G47.33    Vitamin D deficiency E55.9    Normocytic anemia D64.9    Physical deconditioning R53.81    Mood disorder (HCC) F39    Hallucinations R44.3    GERD (gastroesophageal reflux disease) K21.9    Wound of buttock S31.809A    Chest wall pain with tenderness R07.89    Primary osteoarthritis of left hip M16.12    COVID-19 U07.1    COVID-19 virus infection U07.1    Acute respiratory failure with hypoxia (HCC) J96.01    ARF (acute renal failure) with tubular necrosis (HCC) N17.0    Hypokalemia E87.6    Other hypotension I95.89    Pneumonia due to COVID-19 virus U07.1, J12.89    Sepsis due to COVID-19 (HCC) U07.1, A41.89    KRISTY (acute kidney injury) ([facility] [address] N17.9    Hypernatremia E87.0    Acute encephalopathy G93.40    Acute on chronic anemia D64.9    Hypomagnesemia E83.42    Dysphagia R13.10    Diarrhea R19.7    At risk for malnutrition Z91.89    Reactive thrombocytosis R79.89         ASSESSMENT/PLAN   Respiratory failure following COVID -19 infection:     Anemia:     Deconditioning  Ulceration of the rectum with intermittent bleed slowly improving. Obesity  CHF  Continue current treatment.   Jerri Gil MD, FACP 11/1/2020 12:14 PM

## 2020-11-01 NOTE — PROGRESS NOTES
Pharmacy Vancomycin Consult     Vancomycin Day: 10  Current Dosing: on hold    Temp max:  99.3    Recent Labs     10/31/20  0929 11/01/20  0550   BUN 14 14       Recent Labs     10/31/20  0929 11/01/20  0550   CREATININE 1.4* 1.5*       Recent Labs     10/31/20  0929 11/01/20  0550   WBC 12.0* 9.5         Intake/Output Summary (Last 24 hours) at 11/1/2020 0845  Last data filed at 11/1/2020 0302  Gross per 24 hour   Intake 518.68 ml   Output 1375 ml   Net -856.32 ml       Cultures/Sensitivites  Date Source Result   10/12/2020 ETT MRSA   10/12/2020 PNA PCR panel negative   10/22/2020 BC1 Staph coag neg  [employer] -nothing detected        Ht Readings from Last 1 Encounters:   09/23/20 5' 8\" (1.727 m)        Wt Readings from Last 1 Encounters:   11/01/20 294 lb 1.6 oz (133.4 kg)         Body mass index is 44.72 kg/m². Estimated Creatinine Clearance: 77 mL/min (A) (based on SCr of 1.5 mg/dL (H)). Random level: 15.2    Assessment/Plan:  Vancomycin level decreasing appropriately after holding dose, will resume vancomycin at lower dose and extended interval at 1,500 mg IV q36h. Follow renal function closely.      Ramonita Gonsalez, PharmD, BCPS   11/1/2020  8:48 AM

## 2020-11-01 NOTE — FLOWSHEET NOTE
11/01/20 0122   Provider Notification   Reason for Communication Evaluate  (lethargy/confusion)   Provider Name [facility]   Provider Notification [facility] Clinician (CNS, NP, CNM, CRNA, PA)   Method of Communication Secure Message   Response Waiting for response   Notification Time 0122     Messaged hospitalist on patient status. Increased lethargy and confusion. Bipap in place, but patient removes frequently during night. Hospitalist ordered for ABG to be drawn.

## 2020-11-01 NOTE — PROGRESS NOTES
Hospitalist Progress Note      Patient:  Alba Mitchell    Unit/Bed:4K-10/010-A  YOB: 1970  MRN: [id number]   Acct: [de-identified]     PCP: Mustapha Browning MD  Date of Admission: 9/23/2020     Date of Service: Pt seen/examined on 11/01/20  and Admitted to Inpatient with expected LOS greater than two midnights due to medical therapy. Chief Complaint:  Shortness of Breath    Assessment and Plan:    1.) Sepsis due to Covid Pneumonia and Bacteremia: Initially resolved, now having intermittent fever again and today leukocytosis is resolved. Complicated medical course. Recent discharge from [facility] for COVID-19 on 9/15/2020. Readmitted for COVID-19 pneumonia on 9/23/2020, intubation on 9/23/2020 and 10/6/2020. Successfully weaned to room air currently. Per ICU notes patient would be a good candidate for Zyvox, however due to patient's inability to swallow he has been unable to receive Zyvox. Has been treated with Doxycycline and Vancomycin (10/6-10/14), and Zosyn (10/6-10/16) for likely MRSA pneumonia. Patient began having low grade fevers on 10/21, with a temperature of 103.0F on 10/23. Was noted to be septic again with Fever, Tachycardia, and Tachypnea. ID was consulted and started Zosyn for a 7 day course and Vancomycin. Repeat Blood Cultures negative. UA negative. Possible sources were recurrent pneumonia, rectal wound, or PICC line. PICC line could not be removed due to inability to have an NG tube for nutrition. One Blood Culture returned (+) for Gram Positive Cocci in clusters, however there was no other culture to compare it against.  Respiratory Panel was negative. LA normal, ProCal 4.58 on 10/23/20. Dr. Kylie Garcia following, appreciate his assistance and will follow his recommendations.   Antibiotics planned to continue for one week starting on 10/28.  -Leukocytosis resolved, afebrile, on room air, good sats  -Repeat CBC qd  -Antibiotics and procalcitonin per ID      2.) Acute hypoxic respiratory failure due to COVID-19 pneumonia:  Intubated on 10/6/2020, extubated on 10/15/2020. Was on room air until 10/24, when patient required 2L NC, was placed in ICU on 10/25 for Hypotension, and was started on BiPAP at that time. Began to wean on 10/28. Patient sating at 95% without the mask on 10/30. KIARA on BiPAP -patient needs to be on BiPAP whenever sleeping, including naps. 3.) KRISTY due to hypotension (Resolved):  Creatinine 1.1 on admission. Trended up to 4.1 on 10/7/2020. Baseline creatinine 0.6 to 0.8. Nephrology consulted. Started IVF hydration, Cr trended down to 1.5 on 10/24, and has remained stable around 1.3-1.5. Nephrology suggested this is a new baseline. Monitor BMP qd      4.) Hypernatremia, likely due to dehydration (Resolved): Off D5W, patient had episode of hypernatremia of 146 on 10/23. Started back on 0.45% NS as per Nephrology. TPN adjusted by Dietary on 10/27 to ensure no overload of electrolytes. Monitoring BMP qd  - Dietary to reassess need without TPN    5.) Acute encephalopathy, etiology unclear, likely related to recent Covid 19 infection: 10/23 saw Worsening mentation today with fevers, likely septic due to unknown etiology. Infectious causes include rectal wound, pneumonia, PICC line infection. CT head on 10/20/2020 unremarkable. Patient is still lethargic although mentation at baseline. 6.) Acute on chronic Normocytic anemia due to acute rectal bleeding- secondary to gluteal/rectal ulcerations vs hemodilutional from IVF, (Improving):   -Hemoglobin stable. Baseline Hgb 7-8 in Oct 2020 and around 11 in Sep 2020  -No recent BRBPR, melena. Reportedly patient had clots per rectal tube on October 12 and 3 BM's on Oct 25th w/clots. Patient does have rectal bleed from rectal ulcer from Flexi-Seal. GI stated that supportive measures only would be preformed for the perianal ulcers. States increased PO feeds may worsen the ulcers.   Aranesp hydralazine prn ordered. Avoid hypotension with recent sepsis and renal failure. SBP stable at 110     15.) Physical Deconditioning:  Patient likely will need SNF versus LTAC at discharge. Faye evaluation consult ordered. Palliative care following in discussed case with father who primary decision-maker.     16.) Thrombocytosis, likely reactive due to recent COVID-19 (Resolved): , recheck with CBC in am     17.) Mild hypercalcemia, likely from dehydration (Resolved): Ca 9.0, Continue IVF per Nephrology. Recheck with daily BMP.     18.) Urinary retention: On mason cath, US negative for infection. Disposition Plan: [employer] day 4. Patient is clinically much improved, consider ECF or Physical Rehab center. History Of Present Illness:      Mr. Nicole Kerr is a  44year-old morbidly obese black male lifetime non-smoker. Rick Mclaughlin has a history of morbid obesity associated with type 2 diabetes mellitus, prior alcohol abuse, hyperlipidemia, hypertension, hypogonadism, nonalcoholic steatohepatitis, obstructive sleep apnea, and gout.  Patient was hospitalized 9/6/2020 through 9/15/2020 with hypoxemic respiratory failure secondary to COVID-19 associated with diffuse bilateral infiltrates.  At that time, patient received Decadron, remdesivir, and danazol.  During hospitalization, he had issues with atrial fibrillation.  His insulin therapy required adjustment.  He was discharged home on subcutaneous Lovenox. Patient returned back to the emergency room on 9/23/2020 with increasing SOB and progressive hypoxia. CXR showed diffuse infiltrates.  Deteriorated and required intubation. Patient underwent bronchoscopy on 9/27/2020 which demonstrated no mucus production. Patient extubated 10/2/2020.      Patient reintubated for progressive respiratory failure with progressive obtundation on 10/6/2020.  This was associated with acute oliguric renal failure.  Patient was intubated 10/6/2020, and underwent volume resuscitation.  Fractional excretion of sodium returned less than 1 which was consistent with prerenal azotemia.  After volume resuscitation, patient demonstrated that he was hemoconcentrated with a drop of 2 g in the hemoglobin. With volume resuscitation, urine output did improve. After patient was intubated on 10/6/2020, he underwent pulmonary lavage which showed MRSA on the PCR but no other organism.  However, patient was found to have greater than 200 white blood cells in the urine.  Patient was treated with Zosyn and doxycycline. Previously, patient had received vancomycin and developed fever while on vancomycin. Therefore vancomycin was not continued.  Sputum subsequently grew staph aureus.  Zosyn was discontinued but doxycycline continued on 10/14/2020. Patient did well with spontaneous breathing trial and was extubated 10/15/2020.     10/21: Weaned to room air. Respiratory status remained stable. Spiked fever this morning at 100.4 °F.  Possibly due to rectal wound due to Flexi-Seal.  Restarted on IV Zosyn, ID reconsulted. Patient would be a good candidate for SNF versus LTACH at discharge     10/22: Doing well on room air today. Afebrile. Continue Zosyn for 5 to 7 days. Faye evaluation. Continue zinc oxide for rectal wound. Blood pressure is elevated today, IV hydralazine for BP greater than 160     10/23: Recurrent fevers up to 103.0 °F today. Lactic acid ordered and normal.  Restarted vancomycin per ID. Possible PICC line infection versus worsening pneumonia. IV fluid bolus given. Patient tachypneic and tachycardic this morning, improved post fluid administration. 10/24: Patient lethargic and only responds to name. He is unable to stay awake for a complete exam.  He was placed on BiPAP overnight. He did not have a fever overnight. Patient was restarted on Vancomycin yesterday per ID. Patient had mild tachypnea early in the night, however this resolved by morning. The Patient was normocardic throughout the night. Following ID for advice and recommendations. 10/26: Patient more awake today than previous. He is able to answer questions, however limited due to constant use of BiPAP. He was placed in ICU yesterday due to continued blood in his stools as well as   Hypotension. He required 1 unite PRBC this morning. He was not intubated overnight. A sigmoidoscopy was preformed and showed a large perianal ulcer extending into the anal canal.  GI stated there was nothing they could do to prevent re-bleeding at this point. 10/27: Patient able to answer questions better this morning, however he is still tired/lethargic and cannot stay awake for the entire exam.  He denies chest pain. He points to his mask and states that it is painful and itchy. The Night time nurse reports that he had 3 episodes of passing large clots per his rectum. He has been grabbing at the BiPAP mask and has been succesful in taking it off occasionally, but he desatruates and needs it placed back on. He hit is toe against the bedrail and ripped off his right toenail. The nurse kept it in a sample cup. His HgB is at 7.2 (From 7.6 the night prior). Will recheck HgB later today and transfuse if necsessary. GI states only conservative management can be done for the perianal ulcer at this time. They also state that the patient is not a good candidate for PEG tube placement unless he is off of BiPAP. The patient's father does not want to place a tracheostomy at this time. Patient till on TPN. 10/28: Patient able to answer some questions today. Still lethargic on exam.  He is still removing his BiPAP frequently, although his saturation drops below 90 when it is not on. There were no reported episodes of clots passed overnight. He had a BM that was mostly green in consistency. He required 1 unit of PRBC overnight, HgB currently stable at 7.9.   Pre Cert was denied for Faye, starting appeal process. Antibiotics to continue for another week. Nothing else can be done at this time except transfusions and antibiotics. POA states no trach, which means that he will be unable to get a PEG tube. 10/29: Patient asleep for most of exam.  No episodes of Rectal Bleeding seen. Nurses state he is still trying to take the BiPAP off, but quickly desaturates. No units of PRBC had to be transfused. HgB increased to 8.4. Day 2 of appeal for Faye. No current change in treatment plans. 10/30: Patient asleep for most of exam, however he is able saturating well without the BiPAP. Will still need BiPAP at night for KIARA. No further transfusions of clots passed overnight. HgB stable. Day 3 of appeal for Faye. Since off of BiPAP, Speech Therapy has been consulted to assess dysphagia and ability to eat PO. A barium swallow shows no aspiration. ST states a 2 week long term goal to return patient to functional eating status. Will continue antibiotics. 10/31: Overnight event reported, Patient reported to have fallen out of bed around 0620. The fall was unwitnessed and he was found on the ground face down. He stated no loss of consciousness, and denied hitting his head. The PICC line was pulled out during the incident. He was helped back into bed, and reported no headaches, neck pain, or back pain. There are were no visible injuries. Patient states he is feeling well this morning and is saturating well without the BiPAP. He states that he \"tripped\" out of bed in the morning, but did not hit his head. He denies headaches, chest pain, shortness of breath, nausea, vomiting, diarrhea, neck pain, or back pain. He is able to eat solid food, and TPN is now on hold due to no IV access. Will order an IV line to be placed, continue antibiotics.     Past Medical History, Past Surgical History, Allergies, Medications, Social History, Family History reviewed in H&P and remain unchanged from bilaterally       Labs:     Recent Labs     10/31/20  0929 11/01/20  0550   WBC 12.0* 9.5   HGB 9.4* 9.2*   HCT 31.8* 30.3*   * 419*     Recent Labs     10/30/20  0330 10/31/20  0929 11/01/20  0550   * 139 139   K 4.6 4.3 4.3   CL 94* 99 100   CO2 31 27 25   BUN 13 14 14   CREATININE 1.4* 1.4* 1.5*   CALCIUM 10.0 10.8* 10.5   PHOS 3.5 3.6  --      No results for input(s): AST, ALT, BILIDIR, BILITOT, ALKPHOS in the last 72 hours. No results for input(s): INR in the last 72 hours. No results for input(s): Verlena Zain in the last 72 hours. Urinalysis:      Lab Results   Component Value Date    NITRU NEGATIVE 10/06/2020    WBCUA > 200 10/06/2020    BACTERIA FEW 10/06/2020    RBCUA 5-10 10/06/2020    BLOODU LARGE 10/06/2020    SPECGRAV >=1.030 10/06/2020    GLUCOSEU NEGATIVE 08/05/2020       Intake & Output:  I/O last 3 completed shifts: In: 1218.7 [P.O.:120; I.V.:1098.7]  Out: 1725 [Urine:1725]  No intake/output data recorded. Radiology:     CXR 10/23: I have reviewed the CXR with the following interpretation: Poor inflation of lungs, borderline heart size, no effusions, PICC line right with catheter tip in cavoatrial junction, mild infiltrate scattering in both lungs, worsened appearance from prior study    XR CHEST PORTABLE   Final Result   Persistent mild bilateral patchy pulmonary opacities. **This report has been created using voice recognition software. It may contain minor errors which are inherent in voice recognition technology. **      Final report electronically signed by Dr. Evelyn Tucker on 11/1/2020 1:48 PM      FL MODIFIED BARIUM SWALLOW W VIDEO   Final Result   1. Laryngeal penetration of thin barium without evidence of aspiration. 2. Additional recommendations from the speech therapist will follow. **This report has been created using voice recognition software. It may contain minor errors which are inherent in voice recognition technology. ** PORTABLE   Final Result   Impression:   Progressive bilateral consolidations or ARDS. This document has been electronically signed by: Michael Tejada MD on    10/12/2020 04:59 AM         XR CHEST PORTABLE   Final Result    Similar bilateral ill-defined pneumonia. This document has been electronically signed by: Kt Salcido. Charles De Santiago DO on    10/11/2020 09:49 AM         XR CHEST PORTABLE   Final Result   Similar diffuse patchy bilateral airspace disease and consolidations. Support devices as above. This document has been electronically signed by: Briana Kolb MD on    10/10/2020 04:35 AM         XR CHEST PORTABLE   Final Result   No acute findings. This document has been electronically signed by: Zara Ruiz MD on 10/08/2020 07:40 AM         CT ABDOMEN PELVIS WO CONTRAST Additional Contrast? Oral   Final Result    IMPRESSION:   Bilateral lower lobe pneumonia. Known Covid. Continued progress imaging is advised   Distended colon with fluid, air and stool. Correlate for diarrhea. **This report has been created using voice recognition software. It may contain minor errors which are inherent in voice recognition technology. **      Final report electronically signed by Dr. Gi Rae on 10/7/2020 6:49 PM      XR CHEST PORTABLE   Final Result   Bilateral lung consolidation not significant change. This document has been electronically signed by: Zara Ruiz MD on 10/07/2020 07:25 AM         US RENAL COMPLETE   Final Result   Unremarkable kidneys. This document has been electronically signed by: Christina Weaver MD on    10/07/2020 01:48 AM         XR CHEST PORTABLE   Final Result   1. There is a new endotracheal tube with the distal tip 1.8 cm above the level of the madi. 2. There is a new esophageal tube with the distal tip projecting over the gastric fundus.       3. There is a stable right PICC with the distal tip projecting over the right atrium. 4. The lung volumes are diminished. There are bilateral perihilar and the basilar opacities which are similar to the previous examination however may be accentuated by the expiratory technique. There is no pleural effusion. Follow-up chest radiographs    are recommended to confirm complete resolution. **This report has been created using voice recognition software. It may contain minor errors which are inherent in voice recognition technology. **      Final report electronically signed by Dr. Saskia Izquierdo on 10/6/2020 7:18 AM      XR CHEST PORTABLE   Final Result   Bilateral lung opacities. Follow-up to clearing recommended. This document has been electronically signed by: Nixon Douglas MD on 10/06/2020 06:09 AM         XR CHEST PORTABLE   Final Result   Slightly decreased diffuse patchy bilateral airspace disease. Support devices as above. This document has been electronically signed by: Maddy Michael MD on    10/03/2020 05:15 AM         XR CHEST PORTABLE   Final Result   ET tube should be retracted 1.5-2.0 cm. No significant change in coarse scattered bilateral infiltrates. This document has been electronically signed by: Ina Sanchez MD on    09/30/2020 06:50 AM         XR CHEST PORTABLE   Final Result      Slightly improving bilateral pneumonia. **This report has been created using voice recognition software. It may contain minor errors which are inherent in voice recognition technology. **      Final report electronically signed by Dr. Lexi Marcos on 9/27/2020 2:23 PM      XR ABDOMEN FOR NG/OG/NE TUBE PLACEMENT   Final Result   Esophageal route tube tip in the stomach. **This report has been created using voice recognition software. It may contain minor errors which are inherent in voice recognition technology. **      Final report electronically signed by Dr Sharad Brizuela on 9/26/2020 10:22 AM      XR CHEST PORTABLE Final Result   1. Lines and tubes as above. 2. Worsening bilateral pneumonia. **This report has been created using voice recognition software. It may contain minor errors which are inherent in voice recognition technology. **      Final report electronically signed by Dr. Ramu Dutta on 9/24/2020 7:38 AM      XR CHEST PORTABLE   Final Result   1. Endotracheal tube terminates 3.1 cm above the madi. 2.  Orogastric tube in the stomach. 3.  Patchy opacities in the right upper lobe and lingula. This document has been electronically signed by: Christina Weaver MD on    09/24/2020 12:35 AM         XR CHEST PORTABLE   Final Result   1. Bilateral pulmonary opacification worse than on previous study dated 10 September 2020. This may represent worsening inflammatory process, possibly Covid 19 infection. Please correlate clinically   2. Borderline cardiomegaly. **This report has been created using voice recognition software. It may contain minor errors which are inherent in voice recognition technology. **      Final report electronically signed by DR Elvia Rico on 9/23/2020 10:47 AM           DVT prophylaxis: Lovenox, held for GI bleed, initiate SCDs, May consider adding in Lovenox again.     Code Status: Limited    PT/OT Eval Status: Not Consulted    Active Hospital Problems    Diagnosis Date Noted    Sepsis due to COVID-19 ([address] [U07.1, A41.89]     KRISTY (acute kidney injury) ([address] [N17.9]     Hypernatremia [E87.0]     Acute encephalopathy [G93.40]     Acute on chronic anemia [D64.9]     Hypomagnesemia [E83.42]     Dysphagia [R13.10]     Diarrhea [R19.7]     At risk for malnutrition [Z91.89]     Reactive thrombocytosis [R79.89]     Pneumonia due to COVID-19 virus [U07.1, J12.89] 10/18/2020    ARF (acute renal failure) with tubular necrosis (HCC) [N17.0]     Hypokalemia [E87.6]     Other hypotension [I95.89]     Acute respiratory failure with hypoxia ([address] [J96.01]     COVID-19 virus infection [U07.1] 09/06/2020    Essential hypertension [I10]        Thank you Joseph Pearson MD for the opportunity to be involved in this patient's care.     Electronically signed by Kristen Silvermna MD on 11/1/2020 at 4:14 PM

## 2020-11-02 LAB
ANION GAP SERPL CALCULATED.3IONS-SCNC: 15 MEQ/L (ref 8–16)
BASOPHILS # BLD: 0.9 %
BASOPHILS ABSOLUTE: 0.1 THOU/MM3 (ref 0–0.1)
BUN BLDV-MCNC: 14 MG/DL (ref 7–22)
CALCIUM SERPL-MCNC: 10 MG/DL (ref 8.5–10.5)
CHLORIDE BLD-SCNC: 100 MEQ/L (ref 98–111)
CO2: 26 MEQ/L (ref 23–33)
CREAT SERPL-MCNC: 1.8 MG/DL (ref 0.4–1.2)
EOSINOPHIL # BLD: 5.5 %
EOSINOPHILS ABSOLUTE: 0.5 THOU/MM3 (ref 0–0.4)
ERYTHROCYTE [DISTWIDTH] IN BLOOD BY AUTOMATED COUNT: 17 % (ref 11.5–14.5)
ERYTHROCYTE [DISTWIDTH] IN BLOOD BY AUTOMATED COUNT: 59.8 FL (ref 35–45)
GFR SERPL CREATININE-BSD FRML MDRD: 48 ML/MIN/1.73M2
GLUCOSE BLD-MCNC: 102 MG/DL (ref 70–108)
GLUCOSE BLD-MCNC: 109 MG/DL (ref 70–108)
GLUCOSE BLD-MCNC: 111 MG/DL (ref 70–108)
GLUCOSE BLD-MCNC: 118 MG/DL (ref 70–108)
GLUCOSE BLD-MCNC: 99 MG/DL (ref 70–108)
HCT VFR BLD CALC: 30.7 % (ref 42–52)
HEMOGLOBIN: 9.4 GM/DL (ref 14–18)
IMMATURE GRANS (ABS): 0.04 THOU/MM3 (ref 0–0.07)
IMMATURE GRANULOCYTES: 0.4 %
LYMPHOCYTES # BLD: 12.4 %
LYMPHOCYTES ABSOLUTE: 1.1 THOU/MM3 (ref 1–4.8)
MAGNESIUM: 1.7 MG/DL (ref 1.6–2.4)
MCH RBC QN AUTO: 29.9 PG (ref 26–33)
MCHC RBC AUTO-ENTMCNC: 30.6 GM/DL (ref 32.2–35.5)
MCV RBC AUTO: 97.8 FL (ref 80–94)
MONOCYTES # BLD: 11.7 %
MONOCYTES ABSOLUTE: 1.1 THOU/MM3 (ref 0.4–1.3)
NUCLEATED RED BLOOD CELLS: 0 /100 WBC
PLATELET # BLD: 410 THOU/MM3 (ref 130–400)
PMV BLD AUTO: 10.8 FL (ref 9.4–12.4)
POTASSIUM SERPL-SCNC: 4 MEQ/L (ref 3.5–5.2)
RBC # BLD: 3.14 MILL/MM3 (ref 4.7–6.1)
SEG NEUTROPHILS: 69.1 %
SEGMENTED NEUTROPHILS ABSOLUTE COUNT: 6.3 THOU/MM3 (ref 1.8–7.7)
SODIUM BLD-SCNC: 141 MEQ/L (ref 135–145)
WBC # BLD: 9.1 THOU/MM3 (ref 4.8–10.8)

## 2020-11-02 PROCEDURE — 6360000002 HC RX W HCPCS: Performed by: STUDENT IN AN ORGANIZED HEALTH CARE EDUCATION/TRAINING PROGRAM

## 2020-11-02 PROCEDURE — 85025 COMPLETE CBC W/AUTO DIFF WBC: CPT

## 2020-11-02 PROCEDURE — 2060000000 HC ICU INTERMEDIATE R&B

## 2020-11-02 PROCEDURE — 94660 CPAP INITIATION&MGMT: CPT

## 2020-11-02 PROCEDURE — 83735 ASSAY OF MAGNESIUM: CPT

## 2020-11-02 PROCEDURE — 2580000003 HC RX 258: Performed by: STUDENT IN AN ORGANIZED HEALTH CARE EDUCATION/TRAINING PROGRAM

## 2020-11-02 PROCEDURE — 2580000003 HC RX 258: Performed by: FAMILY MEDICINE

## 2020-11-02 PROCEDURE — 94760 N-INVAS EAR/PLS OXIMETRY 1: CPT

## 2020-11-02 PROCEDURE — 36415 COLL VENOUS BLD VENIPUNCTURE: CPT

## 2020-11-02 PROCEDURE — 97530 THERAPEUTIC ACTIVITIES: CPT

## 2020-11-02 PROCEDURE — 80048 BASIC METABOLIC PNL TOTAL CA: CPT

## 2020-11-02 PROCEDURE — 6360000002 HC RX W HCPCS: Performed by: FAMILY MEDICINE

## 2020-11-02 PROCEDURE — 6370000000 HC RX 637 (ALT 250 FOR IP): Performed by: INTERNAL MEDICINE

## 2020-11-02 PROCEDURE — 94640 AIRWAY INHALATION TREATMENT: CPT

## 2020-11-02 PROCEDURE — C9113 INJ PANTOPRAZOLE SODIUM, VIA: HCPCS | Performed by: FAMILY MEDICINE

## 2020-11-02 PROCEDURE — 82948 REAGENT STRIP/BLOOD GLUCOSE: CPT

## 2020-11-02 PROCEDURE — 6370000000 HC RX 637 (ALT 250 FOR IP): Performed by: NURSE PRACTITIONER

## 2020-11-02 PROCEDURE — 99233 SBSQ HOSP IP/OBS HIGH 50: CPT | Performed by: INTERNAL MEDICINE

## 2020-11-02 PROCEDURE — 80202 ASSAY OF VANCOMYCIN: CPT

## 2020-11-02 RX ORDER — 0.9 % SODIUM CHLORIDE 0.9 %
1000 INTRAVENOUS SOLUTION INTRAVENOUS ONCE
Status: COMPLETED | OUTPATIENT
Start: 2020-11-02 | End: 2020-11-02

## 2020-11-02 RX ADMIN — PIPERACILLIN AND TAZOBACTAM 3.38 G: 3; .375 INJECTION, POWDER, LYOPHILIZED, FOR SOLUTION INTRAVENOUS at 12:13

## 2020-11-02 RX ADMIN — PANTOPRAZOLE SODIUM 40 MG: 40 INJECTION, POWDER, FOR SOLUTION INTRAVENOUS at 21:30

## 2020-11-02 RX ADMIN — SODIUM CHLORIDE, PRESERVATIVE FREE 10 ML: 5 INJECTION INTRAVENOUS at 21:30

## 2020-11-02 RX ADMIN — FERROUS SULFATE TAB 325 MG (65 MG ELEMENTAL FE) 325 MG: 325 (65 FE) TAB at 17:30

## 2020-11-02 RX ADMIN — PIPERACILLIN AND TAZOBACTAM 3.38 G: 3; .375 INJECTION, POWDER, LYOPHILIZED, FOR SOLUTION INTRAVENOUS at 21:30

## 2020-11-02 RX ADMIN — GLYCOPYRROLATE AND FORMOTEROL FUMARATE 2 PUFF: 9; 4.8 AEROSOL, METERED RESPIRATORY (INHALATION) at 08:02

## 2020-11-02 RX ADMIN — SODIUM CHLORIDE 1000 ML: 9 INJECTION, SOLUTION INTRAVENOUS at 17:00

## 2020-11-02 RX ADMIN — PIPERACILLIN AND TAZOBACTAM 3.38 G: 3; .375 INJECTION, POWDER, LYOPHILIZED, FOR SOLUTION INTRAVENOUS at 04:10

## 2020-11-02 RX ADMIN — GLYCOPYRROLATE AND FORMOTEROL FUMARATE 2 PUFF: 9; 4.8 AEROSOL, METERED RESPIRATORY (INHALATION) at 18:12

## 2020-11-02 RX ADMIN — PANTOPRAZOLE SODIUM 40 MG: 40 INJECTION, POWDER, FOR SOLUTION INTRAVENOUS at 09:16

## 2020-11-02 RX ADMIN — MODAFINIL 100 MG: 100 TABLET ORAL at 05:36

## 2020-11-02 ASSESSMENT — PAIN SCALES - GENERAL
PAINLEVEL_OUTOF10: 0

## 2020-11-02 ASSESSMENT — PAIN SCALES - WONG BAKER: WONGBAKER_NUMERICALRESPONSE: 0

## 2020-11-02 NOTE — FLOWSHEET NOTE
11/02/20 0543   Provider Notification   Reason for Communication Evaluate  (pt confusion)   Provider Name [facility]   Provider Notification Advance Practice Clinician (CNS, NP, CNM, CRNA, PA)   Method of Communication Secure Message   Response Waiting for response   Notification Time [id number]     Updated hospitalist on patient status. Patient became confused. Increased shortness of breath and wheezing noted. Rectal temp 99.1. CHEM 315. Hospitalist would like to place patient on home cpap for awhile and get a one time respiratory treatment. Update with any changes.

## 2020-11-02 NOTE — PROGRESS NOTES
Comprehensive Nutrition Assessment    Type and Reason for Visit:  Reassess    Nutrition Recommendations/Plan:   Diet per SLP and GI. Send Glucerna TID. Consider MVI. Nutrition Assessment:     Pt. with no improvement from a nutritional standpoint AEB poor oral intake/TPN discontinued. Remains at risk for further nutritional compromise r/t inability to place NGT for anatomical reasons , report that family previously declined  PEG & trach if needed, catabolic illness,behavioral issues, increased nutrient needs to support wound healing, lengthy hospitalization due to positive covid-109, acute respiratory failure, intubated-extubated 10/15/20, sepsis, pneumonia, KRISTY, anemia, GI bleed (large clot/ulceration at site of rectum), deconditioning and underlying medical condition (hx of obesity, DM, GERD, alcohol abuse, HLD, HTN, vitamin D deficiency, CHF. Recommendations/interventions as per above. Malnutrition Assessment:  Malnutrition Status: At risk for malnutrition (Comment)    Context:  Acute Illness     Findings of the 6 clinical characteristics of malnutrition:  Energy Intake:  (0-25% atleast 2 days), missing documentation, no nutrition in ~ 5 days documented by [facility] on 10/30/20  Weight Loss:  1 - 7.5% over 3 months     Body Fat Loss:  No significant body fat loss     Muscle Mass Loss:  No significant muscle mass loss    Fluid Accumulation:  Unable to assess Extremities   Strength:  Not Performed    Estimated Daily Nutrient Needs:  Energy (kcal):  5539-9242 (30-32 kcals/kg IBW in active late phase); Weight Used for Energy Requirements:  Ideal(70 kg - ideal weight)     Protein (g):  ~140 gms (2/kgm IBW) as renal status allows; Weight Used for Protein Requirements:  Ideal(70 kg)        Fluid (ml/day):  per MD; Method Used for Fluid Requirements:  (n/a)      Nutrition Related Findings:  Pt has telesitter, seen ~ 1130 lethargic fell asleep. Labs: (11/2) Hemoglobin 9.4, Creatinine 1.89, chemstick 109-118. Meds: Lantus, Humalog, Glycolax. MBS (10/30/20) minced & moist  with thin liquids, no straws. 5 BM (11/1-11/2)      Wounds:  (stage II perineum, skin tear scrotum, stage II coccyx, DTI buttocks, stage II buttocks)       Current Nutrition Therapies:    DIET DYSPHAGIA MINCED AND MOIST; Carb Control: 3 carb choices (45 gms)/meal; No Added Salt (3-4 GM); No Drinking Straw  Dietary Nutrition Supplements: Diabetic Oral Supplement    Anthropometric Measures:  · Height: 5' 8\" (172.7 cm)  · Current Body Weight: 282 lb 11.2 oz (128.2 kg)(+ 1 RUE  LUE, + 2 RLE & LLE edema)   · Admission Body Weight: 285 lb (129.3 kg)(9/23/20, stated, +1 BLE and BUE edema)    · Usual Body Weight: 306 lb (138.8 kg)(EMR, 6/29/20[facility]  [facility]     · Ideal Body Weight: 154 lbs; BMI: 47.1  · Adjusted Body Weight:  ; (193# (88 kg) adjusted for TPN)   · BMI Categories: Obese Class 3 (BMI 40.0 or greater)       Nutrition Diagnosis:   · Inadequate oral intake related to cognitive or neurological impairment, swallowing difficulty as evidenced by intake 0-25%, weight loss      Nutrition Interventions:   Food and/or Nutrient Delivery:  Continue NPO  Nutrition Education/Counseling:  (education not appropriate at this time)   Coordination of Nutrition Care:  Continue to monitor while inpatient, Speech Therapy, Swallow Evaluation    Goals:  Pt will consume 75% or more at meals during LOS. Nutrition Monitoring and Evaluation:   Behavioral-Environmental Outcomes:  None Identified   Food/Nutrient Intake Outcomes:  Diet Advancement/Tolerance  Physical Signs/Symptoms Outcomes:  Biochemical Data, Chewing or Swallowing, GI Status, Fluid Status or Edema, Nutrition Focused Physical Findings, Skin, Weight     Discharge Planning:     Too soon to determine     Electronically signed by Trina Arias RD, LD on 11/2/20 at 2:49 PM EST    Contact: [phone]

## 2020-11-02 NOTE — PROGRESS NOTES
Hospitalist Progress Note      Patient:  James Velazquez    Unit/Bed:4K-10/010-A  YOB: 1970  MRN: [id number]   Acct: [de-identified]     PCP: Ally Moreno MD  Date of Admission: 9/23/2020    Date of Service: Pt seen/examined on 11/02/20  and Admitted to Inpatient with expected LOS greater than two midnights due to medical therapy. Chief Complaint:  Shortness of Breath    Subjective - Last 24 hours:    Patient is semi-compliant with the BiPAP machine. Nurses frequently reattach BiPAP to patient whenever it comes off or when patient takes it off. TPN was stopped few days ago. Patient finally ate. Had approximately 50-75% of lunch. Dimas to come out after fluid bolus. Patient had a fall 2 nights ago and almost fell this morning. Overall, no acute events overnight. Hemodynamically stable. Assessment and Plan:    1.) Sepsis due to Covid Pneumonia and Bacteremia: Initially resolved, now having intermittent fever again and today leukocytosis is resolved. Complicated medical course. Recent discharge from [facility] for COVID-19 on 9/15/2020. Readmitted for COVID-19 pneumonia on 9/23/2020, intubation on 9/23/2020 and 10/6/2020. Successfully weaned to room air currently. Per ICU notes patient would be a good candidate for Zyvox, however due to patient's inability to swallow he has been unable to receive Zyvox. Has been treated with Doxycycline and Vancomycin (10/6-10/14), and Zosyn (10/6-10/16) for likely MRSA pneumonia. Patient began having low grade fevers on 10/21, with a temperature of 103.0F on 10/23. Was noted to be septic again with Fever, Tachycardia, and Tachypnea. ID was consulted and started Zosyn for a 7 day course and Vancomycin. Repeat Blood Cultures negative. UA negative. Possible sources were recurrent pneumonia, rectal wound, or PICC line. PICC line could not be removed due to inability to have an NG tube for nutrition.   One Blood Culture returned (+) for Gram Positive Cocci in clusters, however there was no other culture to compare it against.  Respiratory Panel was negative. LA normal, ProCal 4.58 on 10/23/20. Dr. Emma Parham following, appreciate his assistance and will follow his recommendations. Antibiotics planned to continue for one week starting on 10/28.  -Leukocytosis resolved, afebrile, on room air, good sats  -Chest x-ray 11/01/20: Persistent mild bilateral patchy pulmonary opacities. -Repeat CBC qd  -Antibiotics and procalcitonin per ID/ following, appreciate his assistance. 2.) Acute hypoxic respiratory failure due to COVID-19 pneumonia:  Intubated on 10/6/2020, extubated on 10/15/2020. Was on room air until 10/24, when patient required 2L NC, was placed in ICU on 10/25 for Hypotension, and was started on BiPAP at that time. Began to wean on 10/28. Patient sating at 95% without the mask on 10/30. KIARA on BiPAP -patient needs to be on BiPAP whenever sleeping, including naps, and often while awake. Patient was offered tracheostomy but family refused to consider this option previously. 3.) KRISTY likely prerenal:  Creatinine 1.8 - judicious fluid hydration. Trended up to 4.1 on 10/7/2020. Baseline creatinine 0.6 to 0.8. Nephrology consulted. Started IVF hydration, Cr trended down to 1.5 on 10/24, and has remained stable around 1.3-1.5. Nephrology suggested this is a new baseline. Monitor BMP qd      4.) Hypernatremia, likely due to dehydration (Resolved): Off D5W, patient had episode of hypernatremia of 146 on 10/23. Started back on 0.45% NS as per Nephrology. TPN stopped, diet advanced. Monitoring BMP qd  - off TPN, patient is eating    5.) Acute encephalopathy, etiology unclear, likely related to recent Covid 19 infection: 10/23 saw Worsening mentation today with fevers, likely septic due to unknown etiology. Infectious causes include rectal wound, pneumonia, PICC line infection. CT head on 10/20/2020 unremarkable.   Patient is still lethargic although mentation at baseline. 6.) Acute on chronic Normocytic anemia due to acute rectal bleeding- secondary to gluteal/rectal ulcerations vs hemodilutional from IVF, (Improving):   -Hemoglobin stable. Baseline Hgb 7-8 in Oct 2020 and around 11 in Sep 2020  -No recent BRBPR, melena. Reportedly patient had clots per rectal tube on October 12 and 3 BM's on Oct 25th w/clots. Patient does have rectal bleed from rectal ulcer from Flexi-Seal. GI stated that supportive measures only would be preformed for the perianal ulcers. States increased PO feeds may worsen the ulcers. Aranesp started on 10/30 by GI for Anemia of Chronic Diseases. -Patient received a total of 6 units so far  -Continue to monitor daily CBC, transfuse per unit protocol    7.) Hypokalemia due to hypomagnesemia and diarrhea, (Resolved):   -Replace per protocol, BMP in am .     8.) Hypomagnesemia likely due to poor oral intake and diarrhea, (Resolved):   -Replace per protocol, check magnesium level in am.     9.) Dysphagia, At risk for Malnutrition: Patient failed Fiberoptic Endoscopic Evaluation of the Swallow (FEES). ST recommend NPO, per ST NGT not an option right now due to failed FEES. Started TPN temporarily on 10/21. Followed dietary recommendation for Free Water addition. GI stated patient not a good candidate for PEG unless off of BiPAP, and father does not want a tracheostomy performed at this time. On 10/30, patient was without his BiPAP all day with good saturation. Speech Therapy reassessed with Barium Swallow and found no aspiration. Stated to wean TPN and transition to solid food. 10/31: TPN removed due to accidental PICC line removal      - Patient tolerating oral food, hold TPN at this time      - GI stated potential increase of rectal bleed during transition     10.) Diarrhea (Resolved): C. diff test ordered by GI, but not done.   May repeat if continue diarrhea.     11.) Sarahi-rectal lesion: Wound ostomy on-board, appreciate input. Cont zinc oxide as ordered.      12. ) Hx of KIARA: Noncompliant with CPAP at home, Pulmonology consulted. Use BiPAP at night. Settings: PEEP 6cm H2O              FiO2 30%              O2 flow rate 2 L/min     13.)  Diabetes mellitus type 2:  Blood sugars within goal range of 140-180. Continue Lantus at current dose and sliding scale insulin. Accu-Cheks q. 4 and at bedtime, Hypoglycemia protocol in place     14.)  Essential hypertension:  Uncontrolled on admission due to pt not receiving PO meds ( on amlodipine, cardizem and hydralazine PO at home). IV hydralazine prn ordered. Avoid hypotension with recent sepsis and renal failure. SBP stable at 110     15.) Physical Deconditioning:  Patient likely will need SNF versus LTAC at discharge. Faye evaluation consult ordered. Palliative care following in discussed case with father who primary decision-maker.     16.) Thrombocytosis, likely reactive due to recent COVID-19 (Resolved): , recheck with CBC in am     17.) Mild hypercalcemia, likely from dehydration (Resolved): Ca 9.0, Continue IVF per Nephrology. Recheck with daily BMP.     18.) Urinary retention: On mason cath, US negative for infection. Disposition Plan: Faye. Patient is clinically much improved, consider ECF or Physical Rehab center. History Of Present Illness:      Mr. Traci Juarez is a  44year-old morbidly obese black male lifetime non-smoker. Sonya Miller has a history of morbid obesity associated with type 2 diabetes mellitus, prior alcohol abuse, hyperlipidemia, hypertension, hypogonadism, nonalcoholic steatohepatitis, obstructive sleep apnea, and gout.  Patient was hospitalized 9/6/2020 through 9/15/2020 with hypoxemic respiratory failure secondary to COVID-19 associated with diffuse bilateral infiltrates.  At that time, patient received Decadron, remdesivir, and danazol.  During hospitalization, he had issues with atrial fibrillation.  His insulin therapy required adjustment.  He was discharged home on subcutaneous Lovenox. Patient returned back to the emergency room on 9/23/2020 with increasing SOB and progressive hypoxia. CXR showed diffuse infiltrates.  Deteriorated and required intubation. Patient underwent bronchoscopy on 9/27/2020 which demonstrated no mucus production. Patient extubated 10/2/2020. Patient reintubated for progressive respiratory failure with progressive obtundation on 10/6/2020.  This was associated with acute oliguric renal failure.  Patient was intubated 10/6/2020, and underwent volume resuscitation.  Fractional excretion of sodium returned less than 1 which was consistent with prerenal azotemia.  After volume resuscitation, patient demonstrated that he was hemoconcentrated with a drop of 2 g in the hemoglobin. With volume resuscitation, urine output did improve. After patient was intubated on 10/6/2020, he underwent pulmonary lavage which showed MRSA on the PCR but no other organism.  However, patient was found to have greater than 200 white blood cells in the urine.  Patient was treated with Zosyn and doxycycline. Previously, patient had received vancomycin and developed fever while on vancomycin. Therefore vancomycin was not continued.  Sputum subsequently grew staph aureus.  Zosyn was discontinued but doxycycline continued on 10/14/2020. Patient did well with spontaneous breathing trial and was extubated 10/15/2020.     10/21: Weaned to room air. Respiratory status remained stable. Spiked fever this morning at 100.4 °F.  Possibly due to rectal wound due to Flexi-Seal.  Restarted on IV Zosyn, ID reconsulted. Patient would be a good candidate for SNF versus LTACH at discharge     10/22: Doing well on room air today. Afebrile. Continue Zosyn for 5 to 7 days. Faye evaluation. Continue zinc oxide for rectal wound.   Blood pressure is elevated today, IV hydralazine for BP greater than 160     10/23: Recurrent fevers up to 103.0 °F today. Lactic acid ordered and normal.  Restarted vancomycin per ID. Possible PICC line infection versus worsening pneumonia. IV fluid bolus given. Patient tachypneic and tachycardic this morning, improved post fluid administration. 10/24: Patient lethargic and only responds to name. He is unable to stay awake for a complete exam.  He was placed on BiPAP overnight. He did not have a fever overnight. Patient was restarted on Vancomycin yesterday per ID. Patient had mild tachypnea early in the night, however this resolved by morning. The Patient was normocardic throughout the night. Following ID for advice and recommendations. 10/26: Patient more awake today than previous. He is able to answer questions, however limited due to constant use of BiPAP. He was placed in ICU yesterday due to continued blood in his stools as well as   Hypotension. He required 1 unite PRBC this morning. He was not intubated overnight. A sigmoidoscopy was preformed and showed a large perianal ulcer extending into the anal canal.  GI stated there was nothing they could do to prevent re-bleeding at this point. 10/27: Patient able to answer questions better this morning, however he is still tired/lethargic and cannot stay awake for the entire exam.  He denies chest pain. He points to his mask and states that it is painful and itchy. The Night time nurse reports that he had 3 episodes of passing large clots per his rectum. He has been grabbing at the BiPAP mask and has been succesful in taking it off occasionally, but he desatruates and needs it placed back on. He hit is toe against the bedrail and ripped off his right toenail. The nurse kept it in a sample cup. His HgB is at 7.2 (From 7.6 the night prior). Will recheck HgB later today and transfuse if necsessary. GI states only conservative management can be done for the perianal ulcer at this time.   They also state that the patient is not a good candidate for PEG tube placement unless he is off of BiPAP. The patient's father does not want to place a tracheostomy at this time. Patient till on TPN. 10/28: Patient able to answer some questions today. Still lethargic on exam.  He is still removing his BiPAP frequently, although his saturation drops below 90 when it is not on. There were no reported episodes of clots passed overnight. He had a BM that was mostly green in consistency. He required 1 unit of PRBC overnight, HgB currently stable at 7.9. Pre Cert was denied for Faye, starting appeal process. Antibiotics to continue for another week. Nothing else can be done at this time except transfusions and antibiotics. POA states no trach, which means that he will be unable to get a PEG tube. 10/29: Patient asleep for most of exam.  No episodes of Rectal Bleeding seen. Nurses state he is still trying to take the BiPAP off, but quickly desaturates. No units of PRBC had to be transfused. HgB increased to 8.4. Day 2 of appeal for Faye. No current change in treatment plans. 10/30: Patient asleep for most of exam, however he is able saturating well without the BiPAP. Will still need BiPAP at night for KIARA. No further transfusions of clots passed overnight. HgB stable. Day 3 of appeal for Faye. Since off of BiPAP, Speech Therapy has been consulted to assess dysphagia and ability to eat PO. A barium swallow shows no aspiration. ST states a 2 week long term goal to return patient to functional eating status. Will continue antibiotics. 10/31: Overnight event reported, Patient reported to have fallen out of bed around 0620. The fall was unwitnessed and he was found on the ground face down. He stated no loss of consciousness, and denied hitting his head. The PICC line was pulled out during the incident. He was helped back into bed, and reported no headaches, neck pain, or back pain.   There are were no increased respiratory effort, coarse breath sounds, difficult to hear beyond noise from BiPAP machine. Cardiovascular:  Regular Rate and rhythm with normal S1/S2 without murmurs, rubs or gallops. Abdomen: Soft, non-tender, non-distended with normal bowel sounds. Musculoskeletal:  No clubbing or cyanosis, mild edema in lower limbs bilaterally. Full range of motion without deformity. Skin: Skin color, texture, turgor normal.  No rashes or lesions. Dry skin around borders of face  Neurologic:  Limited due to inability to follow commands/stay awake  Capillary Refill: Brisk,< 3 seconds   Peripheral Pulses: +2 palpable, equal bilaterally       Labs:     Recent Labs     10/31/20  0929 11/01/20  0550 11/02/20  0852   WBC 12.0* 9.5 9.1   HGB 9.4* 9.2* 9.4*   HCT 31.8* 30.3* 30.7*   * 419* 410*     Recent Labs     10/31/20  0929 11/01/20  0550 11/02/20  0852    139 141   K 4.3 4.3 4.0   CL 99 100 100   CO2 27 25 26   BUN 14 14 14   CREATININE 1.4* 1.5* 1.8*   CALCIUM 10.8* 10.5 10.0   PHOS 3.6  --   --      No results for input(s): AST, ALT, BILIDIR, BILITOT, ALKPHOS in the last 72 hours. No results for input(s): INR in the last 72 hours. No results for input(s): Jadene Moh in the last 72 hours. Urinalysis:      Lab Results   Component Value Date    NITRU NEGATIVE 10/06/2020    WBCUA > 200 10/06/2020    BACTERIA FEW 10/06/2020    RBCUA 5-10 10/06/2020    BLOODU LARGE 10/06/2020    SPECGRAV >=1.030 10/06/2020    GLUCOSEU NEGATIVE 08/05/2020       Intake & Output:  I/O last 3 completed shifts: In: 816.9 [P.O.:600; I.V.:216.9]  Out: 825 [Urine:825]  No intake/output data recorded. Radiology:     CXR 10/23:  I have reviewed the CXR with the following interpretation: Poor inflation of lungs, borderline heart size, no effusions, PICC line right with catheter tip in cavoatrial junction, mild infiltrate scattering in both lungs, worsened appearance from prior study    XR CHEST PORTABLE   Final Result   Persistent mild bilateral patchy pulmonary opacities. **This report has been created using voice recognition software. It may contain minor errors which are inherent in voice recognition technology. **      Final report electronically signed by Dr. Charisse Solomon on 11/1/2020 1:48 PM      FL MODIFIED BARIUM SWALLOW W VIDEO   Final Result   1. Laryngeal penetration of thin barium without evidence of aspiration. 2. Additional recommendations from the speech therapist will follow. **This report has been created using voice recognition software. It may contain minor errors which are inherent in voice recognition technology. **      Final report electronically signed by Dr. Gage Barr on 10/30/2020 11:25 AM      XR CHEST PORTABLE   Final Result   1. Poor inflation lungs. Borderline heart size. No effusion. 2. Right arm PICC line, catheter tip the cavoatrial junction. 3. Mild infiltrate scattered in both lungs and left infrahilar region. 4. Overall appearance slightly worsened from prior study. **This report has been created using voice recognition software. It may contain minor errors which are inherent in voice recognition technology. **      Final report electronically signed by Dr. Emily Mark on 10/23/2020 10:46 AM      CT ABDOMEN PELVIS WO CONTRAST Additional Contrast? None   Final Result   1. Almost complete resolution of previously seen airspace infiltrates in the lung bases. 2. No acute abdominal or pelvic abnormalities. **This report has been created using voice recognition software. It may contain minor errors which are inherent in voice recognition technology. **      Final report electronically signed by Dr. Haim Grant on 10/20/2020 10:50 AM      CT HEAD WO CONTRAST   Final Result    No evidence of acute intracranial abnormality. **This report has been created using voice recognition software.  It may contain minor errors which are inherent in voice recognition technology. **      Final report electronically signed by Dr. Naz Shen MD on 10/20/2020 10:13 AM      XR CHEST PORTABLE   Final Result   Ill-defined bilateral lung opacities. Follow-up as clinically indicated. This document has been electronically signed by: Ruben Lai MD on 10/20/2020 05:38 AM         XR CHEST PORTABLE   Final Result   Minimal residual atelectasis and/or infiltrate within the bilateral mid    and lower lungs with improvement. Improved pulmonary inflation. This document has been electronically signed by: Yuly Arambula MD on    10/16/2020 02:02 AM         XR CHEST PORTABLE   Final Result   Impression:   Progressive bilateral consolidations or ARDS. This document has been electronically signed by: Bailee Randall MD on    10/12/2020 04:59 AM         XR CHEST PORTABLE   Final Result    Similar bilateral ill-defined pneumonia. This document has been electronically signed by: Geovanni Little. Randolph Antonio DO on    10/11/2020 09:49 AM         XR CHEST PORTABLE   Final Result   Similar diffuse patchy bilateral airspace disease and consolidations. Support devices as above. This document has been electronically signed by: Hero Taylor MD on    10/10/2020 04:35 AM         XR CHEST PORTABLE   Final Result   No acute findings. This document has been electronically signed by: Ruben Lai MD on 10/08/2020 07:40 AM         CT ABDOMEN PELVIS WO CONTRAST Additional Contrast? Oral   Final Result    IMPRESSION:   Bilateral lower lobe pneumonia. Known Covid. Continued progress imaging is advised   Distended colon with fluid, air and stool. Correlate for diarrhea. **This report has been created using voice recognition software. It may contain minor errors which are inherent in voice recognition technology. **      Final report electronically signed by Dr. Jose Merritt on 10/7/2020 6:49 PM      XR CHEST PORTABLE   Final Result   Bilateral lung consolidation not significant change. This document has been electronically signed by: Nixon Douglas MD on 10/07/2020 07:25 AM         US RENAL COMPLETE   Final Result   Unremarkable kidneys. This document has been electronically signed by: Shantel Adame MD on    10/07/2020 01:48 AM         XR CHEST PORTABLE   Final Result   1. There is a new endotracheal tube with the distal tip 1.8 cm above the level of the madi. 2. There is a new esophageal tube with the distal tip projecting over the gastric fundus. 3. There is a stable right PICC with the distal tip projecting over the right atrium. 4. The lung volumes are diminished. There are bilateral perihilar and the basilar opacities which are similar to the previous examination however may be accentuated by the expiratory technique. There is no pleural effusion. Follow-up chest radiographs    are recommended to confirm complete resolution. **This report has been created using voice recognition software. It may contain minor errors which are inherent in voice recognition technology. **      Final report electronically signed by Dr. Saskia Izquierdo on 10/6/2020 7:18 AM      XR CHEST PORTABLE   Final Result   Bilateral lung opacities. Follow-up to clearing recommended. This document has been electronically signed by: Nixon Douglas MD on 10/06/2020 06:09 AM         XR CHEST PORTABLE   Final Result   Slightly decreased diffuse patchy bilateral airspace disease. Support devices as above. This document has been electronically signed by: Maddy Michael MD on    10/03/2020 05:15 AM         XR CHEST PORTABLE   Final Result   ET tube should be retracted 1.5-2.0 cm. No significant change in coarse scattered bilateral infiltrates. This document has been electronically signed by:  Ina Sanchez MD on    09/30/2020 06:50 AM         XR CHEST PORTABLE   Final Result Slightly improving bilateral pneumonia. **This report has been created using voice recognition software. It may contain minor errors which are inherent in voice recognition technology. **      Final report electronically signed by Dr. Jessy Gongora on 9/27/2020 2:23 PM      XR ABDOMEN FOR NG/OG/NE TUBE PLACEMENT   Final Result   Esophageal route tube tip in the stomach. **This report has been created using voice recognition software. It may contain minor errors which are inherent in voice recognition technology. **      Final report electronically signed by Dr Angel Bhatti on 9/26/2020 10:22 AM      XR CHEST PORTABLE   Final Result   1. Lines and tubes as above. 2. Worsening bilateral pneumonia. **This report has been created using voice recognition software. It may contain minor errors which are inherent in voice recognition technology. **      Final report electronically signed by Dr. Jessy Gongora on 9/24/2020 7:38 AM      XR CHEST PORTABLE   Final Result   1. Endotracheal tube terminates 3.1 cm above the madi. 2.  Orogastric tube in the stomach. 3.  Patchy opacities in the right upper lobe and lingula. This document has been electronically signed by: Vanna Jackman MD on    09/24/2020 12:35 AM         XR CHEST PORTABLE   Final Result   1. Bilateral pulmonary opacification worse than on previous study dated 10 September 2020. This may represent worsening inflammatory process, possibly Covid 19 infection. Please correlate clinically   2. Borderline cardiomegaly. **This report has been created using voice recognition software. It may contain minor errors which are inherent in voice recognition technology. **      Final report electronically signed by DR Inés Lucas on 9/23/2020 10:47 AM           DVT prophylaxis: Lovenox, held for GI bleed, initiate SCDs, May consider adding in Lovenox again.     Code Status: Limited    PT/OT Eval Status: Not [address] Problems    Diagnosis Date Noted    Sepsis due to COVID-19 ([facility] [address] [U07.1, A41.89]     KRISTY (acute kidney injury) ([address] [N17.9]     Hypernatremia [E87.0]     Acute encephalopathy [G93.40]     Acute on chronic anemia [D64.9]     Hypomagnesemia [E83.42]     Dysphagia [R13.10]     Diarrhea [R19.7]     At risk for malnutrition [Z91.89]     Reactive thrombocytosis [R79.89]     Pneumonia due to COVID-19 virus [U07.1, J12.89] 10/18/2020    ARF (acute renal failure) with tubular necrosis (HCC) [N17.0]     Hypokalemia [E87.6]     Other hypotension [I95.89]     Acute respiratory failure with hypoxia ([facility] [address] [J96.01]     COVID-19 virus infection [U07.1] 09/06/2020    Essential hypertension [I10]        Thank you Mustapha Browning MD for the opportunity to be involved in this patient's care.     Electronically signed by Hammad Henry MD on 11/2/2020 at 4:36 PM

## 2020-11-02 NOTE — PLAN OF CARE
Problem: Impaired respiratory status  Goal: Clear lung sounds  Outcome: Ongoing   Breath sounds are clear and diminished at this time. Continue with mdi to help with lung maintenance.

## 2020-11-02 NOTE — PLAN OF CARE
Problem: Nutrition  Goal: Optimal nutrition therapy  Outcome: Ongoing   Nutrition Problem #1: Inadequate oral intake  Intervention: Food and/or Nutrient Delivery: Continue current diet  Nutritional Goals: Pt will consume 75% or more at meals during LOS.

## 2020-11-02 NOTE — PROGRESS NOTES
[address] [address]  INPATIENT PHYSICAL THERAPY  DAILY NOTE  [facility] ICU STEPDOWN TELEMETRY 4K - 4K-10/010-A     Time In: 1540  Time Out: 1555  Timed Code Treatment Minutes: 15 Minutes  Minutes: 15          Date: 2020  Patient Name: Ela Feilciano,  Gender:  male        MRN: [id number]  : 1970  (44 y.o.)     Referring Practitioner: [address] [place] [address]. Sonja Meredith MD  Diagnosis: acute respiratory failure with hypoxemia  Additional Pertinent Hx: Pt is a 45 yo male with a history of morbid obesity, DM type II, ETOH abuse, hyperlipidemia, KIARA, and non-alcoholic steatohepatitis. He has been in the hospital since 2020 with admitting diagnosis of COVID-19. With this diagnosis he also had bacterial infiltrates. Pt has been intubated and extubated twice during this admission, and his current diagnosis is acute respiratory failure with hypoxemia. He is now COVID negative. Prior Level of Function:  Lives With: Alone  Type of Home: Apartment        Additional Comments: PLOF is unknown. Pt did state he lives alone in an apartment, but due to his difficulty communicating, unable to obtain more information. Restrictions/Precautions:  Restrictions/Precautions: General Precautions, Fall Risk      SUBJECTIVE: Pt resting in bed and agrees to therapy. RN approved session and requests to have pt in bed when session done. PAIN: not reported    OBJECTIVE:  Bed Mobility:  Rolling to Left: Moderate Assistance, with verbal cues    Rolling to Right: Moderate Assistance, X 1   Supine to Sit: Moderate Assistance, X 1, with head of bed raised, with verbal cues , with increased time for completion, with use of bedrail and deflating bed mattress. Sit to Supine: Moderate Assistance, Maximum Assistance, X 1, with verbal cues , with increased time for completion, assist mostly for LEs onto bed.      Transfers:  Not Tested    Ambulation:  Not Tested    Balance:  Static Sitting Balance:  Stand By Assistance  Dynamic Sitting Balance: Contact Guard Assistance     Pt sat edge of bed about 4-5 min with CGA and able to complete long arc quads and seated marches noted below. Exercise:  Patient was guided in 1 set(s) 5-10 reps of exercise to both lower extremities. Glut sets, [employer], Seated marches and Long arc quads. Exercises were completed for increased independence with functional mobility. Functional Outcome Measures: Not completed       ASSESSMENT:  Assessment: Patient progressing toward established goals. and participated fairly well this session. Pt needs motivation to try activity and is needing 1-2 person assist.  Activity Tolerance:  Patient tolerance of  treatment: good.  Minus       Equipment Recommendations:Equipment Needed: (unknown at this time; will likely need some type of DME)  Discharge Recommendations:   Subacute/Skilled Nursing Facility, Continue to assess pending progress    Plan: Times per week: 3-5x  Times per day: Daily  Specific instructions for Next Treatment: co-tx with OT; attempt EOB sitting so pt can improve balance and postural awareness and progress to more advanced activity  Current Treatment Recommendations: Strengthening, Functional Mobility Training, ROM, Transfer Training, Balance Training, ADL/Self-care Training, Endurance Training, Patient/Caregiver Education & Training, Positioning, Safety Education & Training, Cognitive/Perceptual Training, Neuromuscular Re-education    Patient Education  Patient Education: Plan of Care, Home Exercise Program    Goals:  Patient goals : discharge from hospital  Short term goals  Time Frame for Short term goals: by hospital discharge  Short term goal 1: Roll to L and to R with Min Ax 1 for ease of bed mobility  Short term goal 2: Supine<>sit with mod A x2 to improve ability to get in/out of bed. - MET, see revision  Short term goal 3: Pt to tolerate sitting EOB with Natalee x5 min in prep for OOB activity. - MET, see revision  Revised Short-Term Goals:    Short term goals  Time Frame for Short term goals: by hospital discharge  Short term goal 1: Roll to L and to R with Min Ax 1 for ease of bed mobility  Short term goal 2: Supine<>sit with Min A x 1 to improve ability to get in/out of bed. Short term goal 3: Pt to tolerate sitting EOB with SBA x 5 min in prep for OOB activity. Short term goal 4: PT to assess sit <> stand      Long term goals  Time Frame for Long term goals : N/A due to short ELOS    Following session, patient left in safe position with all fall risk precautions in place. Waqar Castaneda.  Shady Leonard, [address]

## 2020-11-02 NOTE — CARE COORDINATION
Update: [place] Appeal; denial upheld, Toy review pending; collaborated with Gaye Rose, [address], [address]  Electronically signed by Ingrid Sanchez RN on 11/2/2020 at 10:00 AM  Update: nsg reports client ate 50-75% diet; collaborated with Gayle Miller, [address] [place] [address]  Electronically signed by Ingrid Sanchez RN on 11/2/2020 at 2:41 PM

## 2020-11-02 NOTE — PROGRESS NOTES
Progress note: Infectious diseases    Patient - Brenda Skaggs,  Age - 44 y.o.    - 1970      Room Number - 4K-10/010-A   MRN -  [id number]   Acct # - [de-identified]  Date of Admission -  2020  9:02 AM    SUBJECTIVE:   No new complaints. OBJECTIVE   VITALS    height is 5' 8\" (1.727 m) and weight is 282 lb 11.2 oz (128.2 kg). His oral temperature is 98.9 °F (37.2 °C). His blood pressure is 130/90 (abnormal) and his pulse is 102. His respiration is 20 and oxygen saturation is 96%. Wt Readings from Last 3 Encounters:   20 282 lb 11.2 oz (128.2 kg)   09/15/20 281 lb 6.4 oz (127.6 kg)   20 (!) 306 lb 6.4 oz (139 kg)       I/O (24 Hours)    Intake/Output Summary (Last 24 hours) at 2020 1730  Last data filed at 2020 1308  Gross per 24 hour   Intake 816.92 ml   Output 825 ml   Net -8.08 ml       General Appearance awake and oriented,    HEENT - normocephalic, atraumatic, pale  conjunctiva,  anicteric sclera    Neck - Supple, no mass  Lungs -  Bilateral   air entry, diminished breath sound  Cardiovascular - Heart sounds are normal.    Abdomen - soft, not distended, nontender,   Neurologic awake, answers questions  Skin - No bruising or bleeding  Extremities - chronic leg edema, perianal open wound. mason in place.     MEDICATIONS:      sodium chloride  1,000 mL Intravenous Once    [Held by provider] vancomycin  1,500 mg Intravenous Q36H    darbepoetin alejandro-polysorbate  60 mcg Subcutaneous Weekly - Thursday    pantoprazole  40 mg Intravenous BID    vancomycin (VANCOCIN) intermittent dosing (placeholder)   Other RX Placeholder    sodium chloride flush  10 mL Intravenous 2 times per day    ferrous sulfate  325 mg Oral BID     insulin lispro  0-12 Units Subcutaneous Q6H    piperacillin-tazobactam  3.375 g Intravenous Q8H    [Held by provider] gabapentin  400 mg Oral BID    modafinil mellitus, type 2) (HCC) E11.9    Heart failure with preserved ejection fraction (HCC) I50.30    History of alcohol abuse F10.11    History of osteomyelitis Z87.39    Essential hypertension I10    Hypogonadism, male E29.1    Major depression F32.9    Morbid obesity (HCC) E66.01    DURAN (nonalcoholic steatohepatitis) K75.81    KIARA (obstructive sleep apnea) G47.33    Vitamin D deficiency E55.9    Normocytic anemia D64.9    Physical deconditioning R53.81    Mood disorder (HCC) F39    Hallucinations R44.3    GERD (gastroesophageal reflux disease) K21.9    Wound of buttock S31.809A    Chest wall pain with tenderness R07.89    Primary osteoarthritis of left hip M16.12    COVID-19 U07.1    COVID-19 virus infection U07.1    Acute respiratory failure with hypoxia ([facility]) J96.01    ARF (acute renal failure) with tubular necrosis ([facility]) N17.0    Hypokalemia E87.6    Other hypotension I95.89    Pneumonia due to COVID-19 virus U07.1, J12.89    Sepsis due to COVID-19 (HCC) U07.1, A41.89    KRISTY (acute kidney injury) ([facility] N17.9    Hypernatremia E87.0    Acute encephalopathy G93.40    Acute on chronic anemia D64.9    Hypomagnesemia E83.42    Dysphagia R13.10    Diarrhea R19.7    At risk for malnutrition Z91.89    Reactive thrombocytosis R79.89         ASSESSMENT/PLAN   Respiratory failure following COVID -19 infection:     Anemia:     Deconditioning  Ulceration of the rectum with intermittent bleed improving  Morbid Obesity  CHF  Continue current treatment.   Sarbjit Montanez MD, [address] 11/2/2020 5:30 PM

## 2020-11-03 LAB
ANION GAP SERPL CALCULATED.3IONS-SCNC: 15 MEQ/L (ref 8–16)
BACTERIA: ABNORMAL /HPF
BASOPHILS # BLD: 0.8 %
BASOPHILS ABSOLUTE: 0.1 THOU/MM3 (ref 0–0.1)
BILIRUBIN URINE: NEGATIVE
BLOOD CULTURE, ROUTINE: NORMAL
BLOOD, URINE: NEGATIVE
BUN BLDV-MCNC: 11 MG/DL (ref 7–22)
CALCIUM SERPL-MCNC: 9.8 MG/DL (ref 8.5–10.5)
CASTS 2: ABNORMAL /LPF
CASTS UA: ABNORMAL /LPF
CHARACTER, URINE: ABNORMAL
CHLORIDE BLD-SCNC: 103 MEQ/L (ref 98–111)
CHLORIDE, URINE: 49 MEQ/L
CO2: 24 MEQ/L (ref 23–33)
COLOR: YELLOW
CREAT SERPL-MCNC: 2 MG/DL (ref 0.4–1.2)
CREATININE URINE: 173.1 MG/DL
CRYSTALS, UA: ABNORMAL
EOSINOPHIL # BLD: 5.3 %
EOSINOPHILS ABSOLUTE: 0.4 THOU/MM3 (ref 0–0.4)
EPITHELIAL CELLS, UA: ABNORMAL /HPF
ERYTHROCYTE [DISTWIDTH] IN BLOOD BY AUTOMATED COUNT: 16.8 % (ref 11.5–14.5)
ERYTHROCYTE [DISTWIDTH] IN BLOOD BY AUTOMATED COUNT: 59.7 FL (ref 35–45)
GFR SERPL CREATININE-BSD FRML MDRD: 43 ML/MIN/1.73M2
GLUCOSE BLD-MCNC: 114 MG/DL (ref 70–108)
GLUCOSE BLD-MCNC: 115 MG/DL (ref 70–108)
GLUCOSE BLD-MCNC: 118 MG/DL (ref 70–108)
GLUCOSE BLD-MCNC: 119 MG/DL (ref 70–108)
GLUCOSE BLD-MCNC: 122 MG/DL (ref 70–108)
GLUCOSE BLD-MCNC: 131 MG/DL (ref 70–108)
GLUCOSE BLD-MCNC: 92 MG/DL (ref 70–108)
GLUCOSE URINE: NEGATIVE MG/DL
HCT VFR BLD CALC: 31.2 % (ref 42–52)
HEMOGLOBIN: 9.5 GM/DL (ref 14–18)
IMMATURE GRANS (ABS): 0.04 THOU/MM3 (ref 0–0.07)
IMMATURE GRANULOCYTES: 0.5 %
KETONES, URINE: NEGATIVE
LEUKOCYTE ESTERASE, URINE: ABNORMAL
LYMPHOCYTES # BLD: 12.6 %
LYMPHOCYTES ABSOLUTE: 1.1 THOU/MM3 (ref 1–4.8)
MCH RBC QN AUTO: 29.5 PG (ref 26–33)
MCHC RBC AUTO-ENTMCNC: 30.4 GM/DL (ref 32.2–35.5)
MCV RBC AUTO: 96.9 FL (ref 80–94)
MISCELLANEOUS 2: ABNORMAL
MONOCYTES # BLD: 11.6 %
MONOCYTES ABSOLUTE: 1 THOU/MM3 (ref 0.4–1.3)
NITRITE, URINE: NEGATIVE
NUCLEATED RED BLOOD CELLS: 0 /100 WBC
PH UA: 5 (ref 5–9)
PLATELET # BLD: 383 THOU/MM3 (ref 130–400)
PMV BLD AUTO: 10.7 FL (ref 9.4–12.4)
POTASSIUM SERPL-SCNC: 3.9 MEQ/L (ref 3.5–5.2)
POTASSIUM, URINE: 61.7 MEQ/L
PRO-BNP: 251.2 PG/ML (ref 0–900)
PROTEIN UA: 30
RBC # BLD: 3.22 MILL/MM3 (ref 4.7–6.1)
RBC URINE: ABNORMAL /HPF
RENAL EPITHELIAL, UA: ABNORMAL
SEG NEUTROPHILS: 69.2 %
SEGMENTED NEUTROPHILS ABSOLUTE COUNT: 5.8 THOU/MM3 (ref 1.8–7.7)
SODIUM BLD-SCNC: 142 MEQ/L (ref 135–145)
SODIUM URINE: 82 MEQ/L
SPECIFIC GRAVITY, URINE: 1.02 (ref 1–1.03)
UROBILINOGEN, URINE: 0.2 EU/DL (ref 0–1)
VANCOMYCIN RANDOM: 13.4 UG/ML (ref 0.1–39.9)
VANCOMYCIN TROUGH: 16.7 UG/ML (ref 5–15)
WBC # BLD: 8.4 THOU/MM3 (ref 4.8–10.8)
WBC UA: ABNORMAL /HPF
YEAST: ABNORMAL

## 2020-11-03 PROCEDURE — 2580000003 HC RX 258: Performed by: STUDENT IN AN ORGANIZED HEALTH CARE EDUCATION/TRAINING PROGRAM

## 2020-11-03 PROCEDURE — 84133 ASSAY OF URINE POTASSIUM: CPT

## 2020-11-03 PROCEDURE — C9113 INJ PANTOPRAZOLE SODIUM, VIA: HCPCS | Performed by: FAMILY MEDICINE

## 2020-11-03 PROCEDURE — 99232 SBSQ HOSP IP/OBS MODERATE 35: CPT | Performed by: INTERNAL MEDICINE

## 2020-11-03 PROCEDURE — 51701 INSERT BLADDER CATHETER: CPT

## 2020-11-03 PROCEDURE — 80202 ASSAY OF VANCOMYCIN: CPT

## 2020-11-03 PROCEDURE — 82436 ASSAY OF URINE CHLORIDE: CPT

## 2020-11-03 PROCEDURE — 2580000003 HC RX 258: Performed by: FAMILY MEDICINE

## 2020-11-03 PROCEDURE — 2580000003 HC RX 258

## 2020-11-03 PROCEDURE — 6360000002 HC RX W HCPCS

## 2020-11-03 PROCEDURE — 6370000000 HC RX 637 (ALT 250 FOR IP): Performed by: STUDENT IN AN ORGANIZED HEALTH CARE EDUCATION/TRAINING PROGRAM

## 2020-11-03 PROCEDURE — 87086 URINE CULTURE/COLONY COUNT: CPT

## 2020-11-03 PROCEDURE — 83880 ASSAY OF NATRIURETIC PEPTIDE: CPT

## 2020-11-03 PROCEDURE — 82570 ASSAY OF URINE CREATININE: CPT

## 2020-11-03 PROCEDURE — 82948 REAGENT STRIP/BLOOD GLUCOSE: CPT

## 2020-11-03 PROCEDURE — 6360000002 HC RX W HCPCS: Performed by: STUDENT IN AN ORGANIZED HEALTH CARE EDUCATION/TRAINING PROGRAM

## 2020-11-03 PROCEDURE — 94640 AIRWAY INHALATION TREATMENT: CPT

## 2020-11-03 PROCEDURE — 94761 N-INVAS EAR/PLS OXIMETRY MLT: CPT

## 2020-11-03 PROCEDURE — 6370000000 HC RX 637 (ALT 250 FOR IP): Performed by: NURSE PRACTITIONER

## 2020-11-03 PROCEDURE — 2580000003 HC RX 258: Performed by: INTERNAL MEDICINE

## 2020-11-03 PROCEDURE — 6370000000 HC RX 637 (ALT 250 FOR IP): Performed by: INTERNAL MEDICINE

## 2020-11-03 PROCEDURE — 80048 BASIC METABOLIC PNL TOTAL CA: CPT

## 2020-11-03 PROCEDURE — 84300 ASSAY OF URINE SODIUM: CPT

## 2020-11-03 PROCEDURE — 51798 US URINE CAPACITY MEASURE: CPT

## 2020-11-03 PROCEDURE — 2060000000 HC ICU INTERMEDIATE R&B

## 2020-11-03 PROCEDURE — 6360000002 HC RX W HCPCS: Performed by: FAMILY MEDICINE

## 2020-11-03 PROCEDURE — 81001 URINALYSIS AUTO W/SCOPE: CPT

## 2020-11-03 PROCEDURE — 85025 COMPLETE CBC W/AUTO DIFF WBC: CPT

## 2020-11-03 PROCEDURE — 36415 COLL VENOUS BLD VENIPUNCTURE: CPT

## 2020-11-03 RX ORDER — TAMSULOSIN HYDROCHLORIDE 0.4 MG/1
0.8 CAPSULE ORAL NIGHTLY
Status: DISCONTINUED | OUTPATIENT
Start: 2020-11-03 | End: 2020-11-06 | Stop reason: HOSPADM

## 2020-11-03 RX ORDER — SODIUM CHLORIDE 9 MG/ML
INJECTION, SOLUTION INTRAVENOUS CONTINUOUS
Status: DISCONTINUED | OUTPATIENT
Start: 2020-11-03 | End: 2020-11-06

## 2020-11-03 RX ADMIN — PIPERACILLIN AND TAZOBACTAM 3.38 G: 3; .375 INJECTION, POWDER, LYOPHILIZED, FOR SOLUTION INTRAVENOUS at 11:58

## 2020-11-03 RX ADMIN — FERROUS SULFATE TAB 325 MG (65 MG ELEMENTAL FE) 325 MG: 325 (65 FE) TAB at 16:53

## 2020-11-03 RX ADMIN — TAMSULOSIN HYDROCHLORIDE 0.8 MG: 0.4 CAPSULE ORAL at 19:51

## 2020-11-03 RX ADMIN — PANTOPRAZOLE SODIUM 40 MG: 40 INJECTION, POWDER, FOR SOLUTION INTRAVENOUS at 19:51

## 2020-11-03 RX ADMIN — FERROUS SULFATE TAB 325 MG (65 MG ELEMENTAL FE) 325 MG: 325 (65 FE) TAB at 09:41

## 2020-11-03 RX ADMIN — SODIUM CHLORIDE, PRESERVATIVE FREE 10 ML: 5 INJECTION INTRAVENOUS at 19:51

## 2020-11-03 RX ADMIN — VANCOMYCIN HYDROCHLORIDE 1500 MG: 5 INJECTION, POWDER, LYOPHILIZED, FOR SOLUTION INTRAVENOUS at 16:53

## 2020-11-03 RX ADMIN — MODAFINIL 100 MG: 100 TABLET ORAL at 05:40

## 2020-11-03 RX ADMIN — GLYCOPYRROLATE AND FORMOTEROL FUMARATE 2 PUFF: 9; 4.8 AEROSOL, METERED RESPIRATORY (INHALATION) at 07:35

## 2020-11-03 RX ADMIN — SODIUM CHLORIDE: 9 INJECTION, SOLUTION INTRAVENOUS at 18:21

## 2020-11-03 RX ADMIN — SODIUM CHLORIDE, PRESERVATIVE FREE 10 ML: 5 INJECTION INTRAVENOUS at 09:42

## 2020-11-03 RX ADMIN — PANTOPRAZOLE SODIUM 40 MG: 40 INJECTION, POWDER, FOR SOLUTION INTRAVENOUS at 09:41

## 2020-11-03 RX ADMIN — GLYCOPYRROLATE AND FORMOTEROL FUMARATE 2 PUFF: 9; 4.8 AEROSOL, METERED RESPIRATORY (INHALATION) at 17:33

## 2020-11-03 RX ADMIN — PIPERACILLIN AND TAZOBACTAM 3.38 G: 3; .375 INJECTION, POWDER, LYOPHILIZED, FOR SOLUTION INTRAVENOUS at 03:53

## 2020-11-03 ASSESSMENT — PAIN SCALES - GENERAL
PAINLEVEL_OUTOF10: 0
PAINLEVEL_OUTOF10: 0

## 2020-11-03 NOTE — PROGRESS NOTES
Progress note: Infectious diseases    Patient - Keshav Arriaza,  Age - 44 y.o.    - 1970      Room Number - 4K-10/010-A   MRN -  [id number]   Acct # - [de-identified]  Date of Admission -  2020  9:02 AM    SUBJECTIVE:   No new issues. OBJECTIVE   VITALS    height is 5' 8\" (1.727 m) and weight is 288 lb 9.6 oz (130.9 kg). His oral temperature is 99.4 °F (37.4 °C). His blood pressure is 134/74 and his pulse is 93. His respiration is 18 and oxygen saturation is 98%. Wt Readings from Last 3 Encounters:   20 288 lb 9.6 oz (130.9 kg)   09/15/20 281 lb 6.4 oz (127.6 kg)   20 (!) 306 lb 6.4 oz (139 kg)       I/O (24 Hours)    Intake/Output Summary (Last 24 hours) at 11/3/2020 1343  Last data filed at 11/3/2020 0553  Gross per 24 hour   Intake 1215.72 ml   Output 1000 ml   Net 215.72 ml       General Appearance awake and oriented,    HEENT - normocephalic, atraumatic, pale  conjunctiva,  anicteric sclera    Neck - Supple, no mass  Lungs -  Bilateral   air entry, diminished breath sound  Cardiovascular - Heart sounds are normal.    Abdomen - soft, not distended, nontender,   Neurologic awake, answers questions  Skin - No bruising or bleeding  Extremities - chronic leg edema, perianal open wound. mason in place.     MEDICATIONS:      [Held by provider] vancomycin  1,500 mg Intravenous Q36H    darbepoetin alejandro-polysorbate  60 mcg Subcutaneous Weekly - Thursday    pantoprazole  40 mg Intravenous BID    vancomycin (VANCOCIN) intermittent dosing (placeholder)   Other RX Placeholder    sodium chloride flush  10 mL Intravenous 2 times per day    ferrous sulfate  325 mg Oral BID WC    insulin lispro  0-12 Units Subcutaneous Q6H    piperacillin-tazobactam  3.375 g Intravenous Q8H    [Held by provider] gabapentin  400 mg Oral BID    modafinil  100 mg Oral Daily    [Held by provider] enoxaparin  40 mg Subcutaneous BID    insulin glargine  38 Units Subcutaneous Nightly    lidocaine 1 % injection  5 mL Intradermal Once    magnesium replacement protocol   Other RX Placeholder    phosphorus replacement protocol   Other RX Placeholder    calcium replacement protocol   Other RX Placeholder    [Held by provider] ARIPiprazole  15 mg Oral Daily    [Held by provider] aspirin  81 mg Oral Daily    glycopyrrolate-formoterol  2 puff Inhalation BID      sodium chloride      dextrose       sodium chloride flush, hydrALAZINE, acetaminophen, potassium chloride **OR** potassium alternative oral replacement **OR** potassium chloride, potassium chloride, lidocaine, acetaminophen **OR** [DISCONTINUED] acetaminophen, polyethylene glycol, promethazine **OR** ondansetron, albuterol, glucose, dextrose, glucagon (rDNA), dextrose      LABS:     CBC:   Recent Labs     11/01/20  0550 11/02/20  0852 11/03/20  0602   WBC 9.5 9.1 8.4   HGB 9.2* 9.4* 9.5*   * 410* 383     BMP:    Recent Labs     11/01/20  0550 11/02/20  0852 11/03/20  0602    141 142   K 4.3 4.0 3.9    100 103   CO2 25 26 24   BUN 14 14 11   CREATININE 1.5* 1.8* 2.0*   GLUCOSE 126* 118* 119*     Calcium:  Recent Labs     11/03/20  0602   CALCIUM 9.8     Ionized Calcium:No results for input(s): IONCA in the last 72 hours. Magnesium:  Recent Labs     11/02/20  0852   MG 1.7     Phosphorus:  No results for input(s): PHOS in the last 72 hours. BNP:No results for input(s): BNP in the last 72 hours. Glucose:  Recent Labs     11/02/20  2313 11/03/20  0535 11/03/20  1135   POCGLU 118* 118* 92         Problem list of patient:     Patient Active Problem List   Diagnosis Code    Chronic diastolic congestive heart failure (HCC) I50.32    Atrial fibrillation (HCC) I48.91    BPH (benign prostatic hyperplasia) N40.0    Carpal tunnel syndrome on right G56.01    Chronic gout M1A. 9XX0    DM2 (diabetes mellitus, type 2) ([facility] E11.9    Heart failure with preserved ejection fraction (HCC) I50.30    History of alcohol abuse F10.11    History of osteomyelitis Z87.39    Essential hypertension I10    Hypogonadism, male E29.1    Major depression F32.9    Morbid obesity (HCC) E66.01    DRUAN (nonalcoholic steatohepatitis) K75.81    KIARA (obstructive sleep apnea) G47.33    Vitamin D deficiency E55.9    Normocytic anemia D64.9    Physical deconditioning R53.81    Mood disorder (HCC) F39    Hallucinations R44.3    GERD (gastroesophageal reflux disease) K21.9    Wound of buttock S31.809A    Chest wall pain with tenderness R07.89    Primary osteoarthritis of left hip M16.12    COVID-19 U07.1    COVID-19 virus infection U07.1    Acute respiratory failure with hypoxia ([facility]) J96.01    ARF (acute renal failure) with tubular necrosis ([facility]) N17.0    Hypokalemia E87.6    Other hypotension I95.89    Pneumonia due to COVID-19 virus U07.1, J12.89    Sepsis due to COVID-19 ([facility]) U07.1, A41.89    KRISTY (acute kidney injury) ([facility] [address] N17.9    Hypernatremia E87.0    Acute encephalopathy G93.40    Acute on chronic anemia D64.9    Hypomagnesemia E83.42    Dysphagia R13.10    Diarrhea R19.7    At risk for malnutrition Z91.89    Reactive thrombocytosis R79.89         ASSESSMENT/PLAN   Respiratory failure following COVID -19 infection:     Anemia:     Deconditioning  Ulceration of the rectum with intermittent bleed improving  Morbid Obesity  CHF  Need placement  Unk Sandhoff, MD, FACP 11/3/2020 1:43 PM

## 2020-11-03 NOTE — CARE COORDINATION
11/3/20, 2:25 PM EST    DISCHARGE PLANNING EVALUATION      S\W spoke to patient's father, Alfreda Hernandez, by phone and updated him on the [facility] process and that he is loosing criteria but still not safe to be home alone and has more medical needs. He was in agreement with this and that [facility] would be his choice for ECF as he had been there before. SW made referral to [facility] with ECF. They will review and start the pre-cert if able to take  11/3/20, 2:47 PM EST    DISCHARGE PLANNING EVALUATION      SW received a call from patient's , states he takes care of all of his affairs. SW updated him. He was in agreement.

## 2020-11-03 NOTE — PROGRESS NOTES
Blood Culture returned (+) for Gram Positive Cocci in clusters, however there was no other culture to compare it against.  Respiratory Panel was negative. LA normal, ProCal 4.58 on 10/23/20. Dr. Gunjan Coyne following, appreciate his assistance and will follow his recommendations. Antibiotics planned to continue for one week starting on 10/28.  -Leukocytosis resolved, afebrile, on room air, good sats  -Chest x-ray 11/01/20: Persistent mild bilateral patchy pulmonary opacities. -Repeat CBC qd  -Antibiotics and procalcitonin per ID/ following, appreciate his assistance. 2.) Acute hypoxic respiratory failure due to COVID-19 pneumonia:  Intubated on 10/6/2020, extubated on 10/15/2020. Was on room air until 10/24, when patient required 2L NC, was placed in ICU on 10/25 for Hypotension, and was started on BiPAP at that time. Began to wean on 10/28. Patient sating at 95% without the mask on 10/30. KIARA on BiPAP -patient needs to be on BiPAP whenever sleeping, including naps, and often while awake. Patient was offered tracheostomy but family refused to consider this option previously. 3.) KRISTY likely obstructive vs prerenal vs AIN:  Creatinine 1.8 - judicious fluid hydration. Trended up to 4.1 on 10/7/2020. Baseline creatinine 0.6 to 0.8. Nephrology consulted. Started IVF hydration, Cr trended down to 1.5 on 10/24, and has remained stable around 1.3-1.5. Nephrology suggested this is a new baseline. Monitor BMP qd  -Patient's creatinine went up to 1.8 on 11/2/2020  -1000 mL fluid bolus was ordered during the day  -Dimas was removed by night team  -Bladder scan this morning revealed 300 mL of urine, patient has been receiving intermittent straight cath  -11/3/2020 creatinine up to 2.0; nephrology consulted appreciate their assistance. -Nephrology ordered urinary indices  -Tamsulosin restarted    4.) Hypernatremia, likely due to dehydration (Resolved):  Off D5W, patient had episode of hypernatremia of 146 on 10/23. Started back on 0.45% NS as per Nephrology. TPN stopped, diet advanced. Monitoring BMP qd  - off TPN, patient is eating    5.) Acute encephalopathy, etiology unclear, likely related to recent Covid 19 infection: 10/23 saw Worsening mentation today with fevers, likely septic due to unknown etiology. Infectious causes include rectal wound, pneumonia, PICC line infection. CT head on 10/20/2020 unremarkable. Patient is still lethargic although mentation at baseline. 6.) Acute on chronic Normocytic anemia due to acute rectal bleeding- secondary to gluteal/rectal ulcerations vs hemodilutional from IVF, (Improving):   -Hemoglobin stable. Baseline Hgb 7-8 in Oct 2020 and around 11 in Sep 2020  -No recent BRBPR, melena. Reportedly patient had clots per rectal tube on October 12 and 3 BM's on Oct 25th w/clots. Patient does have rectal bleed from rectal ulcer from Flexi-Seal. GI stated that supportive measures only would be preformed for the perianal ulcers. States increased PO feeds may worsen the ulcers. Aranesp started on 10/30 by GI for Anemia of Chronic Diseases. -Patient received a total of 6 units so far  -Continue to monitor daily CBC, transfuse per unit protocol    7.) Hypokalemia due to hypomagnesemia and diarrhea, (Resolved):   -Replace per protocol, BMP in am .  -Will restart Effer- K 40 meq BID, if potassium drops while off TPN     8.) Hypomagnesemia likely due to poor oral intake and diarrhea, (Resolved):   -Replace per protocol, check magnesium level in am.     9.) Dysphagia, At risk for Malnutrition: Patient failed Fiberoptic Endoscopic Evaluation of the Swallow (FEES). ST recommend NPO, per ST NGT not an option right now due to failed FEES. Started TPN temporarily on 10/21. Followed dietary recommendation for Free Water addition. GI stated patient not a good candidate for PEG unless off of BiPAP, and father does not want a tracheostomy performed at this time.   On 10/30, patient was without his BiPAP all day with good saturation. Speech Therapy reassessed with Barium Swallow and found no aspiration. Stated to wean TPN and transition to solid food. 10/31: TPN removed due to accidental PICC line removal      - Patient tolerating oral food, hold TPN at this time      - GI stated potential increase of rectal bleed during transition     10.) Diarrhea (Resolved): C. diff test ordered by GI, but not done. May repeat if continue diarrhea.     11.) Sarahi-rectal lesion: Wound ostomy on-board, appreciate input. Cont zinc oxide as ordered.      12. ) Hx of KIARA: Noncompliant with CPAP at home, Pulmonology consulted. Use BiPAP at night. Settings: PEEP 6cm H2O              FiO2 30%              O2 flow rate 2 L/min     13.)  Diabetes mellitus type 2:  Blood sugars within goal range of 140-180. Continue Lantus at current dose and sliding scale insulin. Accu-Cheks q. 4 and at bedtime, Hypoglycemia protocol in place     14.)  Essential hypertension:  Uncontrolled on admission due to pt not receiving PO meds ( on amlodipine, cardizem and hydralazine PO at home). IV hydralazine prn ordered. Avoid hypotension with recent sepsis and renal failure. SBP stable at 110     15.) Physical Deconditioning:  Patient likely will need SNF versus LTAC at discharge. [facility] evaluation consult ordered. Palliative care following in discussed case with father who primary decision-maker.     16.) Thrombocytosis, likely reactive due to recent COVID-19 (Resolved): , recheck with CBC in am     17.) Mild hypercalcemia, likely from dehydration (Resolved): Ca 9.0, Continue IVF per Nephrology. Recheck with daily BMP.     18.) Urinary retention: On mason cath, US negative for infection. Disposition Plan: [facility]. Patient is clinically much improved, consider ECF or Physical Rehab center.      History Of Present Illness:      Mr. Jay Ziegler is a  44year-old morbidly obese black male lifetime extubated 10/15/2020.     10/21: Weaned to room air. Respiratory status remained stable. Spiked fever this morning at 100.4 °F.  Possibly due to rectal wound due to Flexi-Seal.  Restarted on IV Zosyn, ID reconsulted. Patient would be a good candidate for SNF versus LTACH at discharge     10/22: Doing well on room air today. Afebrile. Continue Zosyn for 5 to 7 days. [facility] evaluation. Continue zinc oxide for rectal wound. Blood pressure is elevated today, IV hydralazine for BP greater than 160     10/23: Recurrent fevers up to 103.0 °F today. Lactic acid ordered and normal.  Restarted vancomycin per ID. Possible PICC line infection versus worsening pneumonia. IV fluid bolus given. Patient tachypneic and tachycardic this morning, improved post fluid administration. 10/24: Patient lethargic and only responds to name. He is unable to stay awake for a complete exam.  He was placed on BiPAP overnight. He did not have a fever overnight. Patient was restarted on Vancomycin yesterday per ID. Patient had mild tachypnea early in the night, however this resolved by morning. The Patient was normocardic throughout the night. Following ID for advice and recommendations. 10/26: Patient more awake today than previous. He is able to answer questions, however limited due to constant use of BiPAP. He was placed in ICU yesterday due to continued blood in his stools as well as   Hypotension. He required 1 unite PRBC this morning. He was not intubated overnight. A sigmoidoscopy was preformed and showed a large perianal ulcer extending into the anal canal.  GI stated there was nothing they could do to prevent re-bleeding at this point. 10/27: Patient able to answer questions better this morning, however he is still tired/lethargic and cannot stay awake for the entire exam.  He denies chest pain. He points to his mask and states that it is painful and itchy.   The Night time nurse reports that he had 3 episodes of passing large clots per his rectum. He has been grabbing at the BiPAP mask and has been succesful in taking it off occasionally, but he desatruates and needs it placed back on. He hit is toe against the bedrail and ripped off his right toenail. The nurse kept it in a sample cup. His HgB is at 7.2 (From 7.6 the night prior). Will recheck HgB later today and transfuse if necsessary. GI states only conservative management can be done for the perianal ulcer at this time. They also state that the patient is not a good candidate for PEG tube placement unless he is off of BiPAP. The patient's father does not want to place a tracheostomy at this time. Patient till on TPN. 10/28: Patient able to answer some questions today. Still lethargic on exam.  He is still removing his BiPAP frequently, although his saturation drops below 90 when it is not on. There were no reported episodes of clots passed overnight. He had a BM that was mostly green in consistency. He required 1 unit of PRBC overnight, HgB currently stable at 7.9. Pre Cert was denied for Faye, starting appeal process. Antibiotics to continue for another week. Nothing else can be done at this time except transfusions and antibiotics. POA states no trach, which means that he will be unable to get a PEG tube. 10/29: Patient asleep for most of exam.  No episodes of Rectal Bleeding seen. Nurses state he is still trying to take the BiPAP off, but quickly desaturates. No units of PRBC had to be transfused. HgB increased to 8.4. Day 2 of appeal for Faye. No current change in treatment plans. 10/30: Patient asleep for most of exam, however he is able saturating well without the BiPAP. Will still need BiPAP at night for KIARA. No further transfusions of clots passed overnight. HgB stable. Day 3 of appeal for Faye. Since off of BiPAP, Speech Therapy has been consulted to assess dysphagia and ability to eat PO.   A barium swallow shows no aspiration. ST states a 2 week long term goal to return patient to functional eating status. Will continue antibiotics. 10/31: Overnight event reported, Patient reported to have fallen out of bed around 0620. The fall was unwitnessed and he was found on the ground face down. He stated no loss of consciousness, and denied hitting his head. The PICC line was pulled out during the incident. He was helped back into bed, and reported no headaches, neck pain, or back pain. There are were no visible injuries. Patient states he is feeling well this morning and is saturating well without the BiPAP. He states that he \"tripped\" out of bed in the morning, but did not hit his head. He denies headaches, chest pain, shortness of breath, nausea, vomiting, diarrhea, neck pain, or back pain. He is able to eat solid food, and TPN is now on hold due to no IV access. Will order an IV line to be placed, continue antibiotics. Past Medical History, Past Surgical History, Allergies, Medications, Social History, Family History reviewed in H&P and remain unchanged from admission. Diet:  DIET DYSPHAGIA MINCED AND MOIST; Carb Control: 3 carb choices (45 gms)/meal; No Added Salt (3-4 GM);  No Drinking Straw  Dietary Nutrition Supplements: Diabetic Oral Supplement    Review of Systems:      Review of Systems - General ROS: negative for - chills, fatigue or fever  Respiratory ROS: no cough, shortness of breath, or wheezing  Cardiovascular ROS: no chest pain or dyspnea on exertion  Gastrointestinal ROS: no abdominal pain, change in bowel habits, or black or bloody stools  Genito-Urinary ROS: no dysuria, trouble voiding, or hematuria  Musculoskeletal ROS: negative for - joint stiffness, joint swelling, muscle pain or muscular weakness  Neurological ROS: negative for - confusion, dizziness, headaches, seizures or tremors  Dermatological ROS: negative for - pruritus, rash or skin lesion changes    Physical exam:    /74   Pulse 93   Temp 99.4 °F (37.4 °C) (Oral)   Resp 18   Ht 5' 8\" (1.727 m)   Wt 288 lb 9.6 oz (130.9 kg)   SpO2 98%   BMI 43.88 kg/m²     General appearance:  Laying in bed on side, Able to answer questions appropriately, Obese  HEENT:  Normal cephalic, atraumatic without obvious deformity. Pupils equal, round, and reactive to light. Conjunctivae/corneas clear. Neck: Supple, with full range of motion. No jugular venous distention. Trachea midline. Respiratory:  BiPAP removed today, increased respiratory effort, coarse breath sounds, difficult to hear beyond noise from BiPAP machine. Cardiovascular:  Regular Rate and rhythm with normal S1/S2 without murmurs, rubs or gallops. Abdomen: Soft, non-tender, non-distended with normal bowel sounds. Musculoskeletal:  No clubbing or cyanosis, mild edema in lower limbs bilaterally. Full range of motion without deformity. Skin: Skin color, texture, turgor normal.  No rashes or lesions. Dry skin around borders of face  Neurologic:  Limited due to inability to follow commands/stay awake  Capillary Refill: Brisk,< 3 seconds   Peripheral Pulses: +2 palpable, equal bilaterally       Labs:     Recent Labs     11/01/20  0550 11/02/20  0852 11/03/20  0602   WBC 9.5 9.1 8.4   HGB 9.2* 9.4* 9.5*   HCT 30.3* 30.7* 31.2*   * 410* 383     Recent Labs     11/01/20  0550 11/02/20  0852 11/03/20  0602    141 142   K 4.3 4.0 3.9    100 103   CO2 25 26 24   BUN 14 14 11   CREATININE 1.5* 1.8* 2.0*   CALCIUM 10.5 10.0 9.8     No results for input(s): AST, ALT, BILIDIR, BILITOT, ALKPHOS in the last 72 hours. No results for input(s): INR in the last 72 hours. No results for input(s): Shellia Bugler in the last 72 hours.     Urinalysis:      Lab Results   Component Value Date    NITRU NEGATIVE 10/06/2020    WBCUA > 200 10/06/2020    BACTERIA FEW 10/06/2020    RBCUA 5-10 10/06/2020    BLOODU LARGE 10/06/2020    SPECGRAV >=1.030 10/06/2020 GLUCOSEU NEGATIVE 08/05/2020       Intake & Output:  I/O last 3 completed shifts: In: 1746.7 [P.O.:670; I.V.:1076.7]  Out: 1300 [Urine:1300]  I/O this shift:  In: 20 [I.V.:20]  Out: -       Radiology:     CXR 10/23: I have reviewed the CXR with the following interpretation: Poor inflation of lungs, borderline heart size, no effusions, PICC line right with catheter tip in cavoatrial junction, mild infiltrate scattering in both lungs, worsened appearance from prior study    XR CHEST PORTABLE   Final Result   Persistent mild bilateral patchy pulmonary opacities. **This report has been created using voice recognition software. It may contain minor errors which are inherent in voice recognition technology. **      Final report electronically signed by Dr. Jose Matias on 11/1/2020 1:48 PM      FL MODIFIED BARIUM SWALLOW W VIDEO   Final Result   1. Laryngeal penetration of thin barium without evidence of aspiration. 2. Additional recommendations from the speech therapist will follow. **This report has been created using voice recognition software. It may contain minor errors which are inherent in voice recognition technology. **      Final report electronically signed by Dr. Vanesa Woodard on 10/30/2020 11:25 AM      XR CHEST PORTABLE   Final Result   1. Poor inflation lungs. Borderline heart size. No effusion. 2. Right arm PICC line, catheter tip the cavoatrial junction. 3. Mild infiltrate scattered in both lungs and left infrahilar region. 4. Overall appearance slightly worsened from prior study. **This report has been created using voice recognition software. It may contain minor errors which are inherent in voice recognition technology. **      Final report electronically signed by Dr. Kristine Gates on 10/23/2020 10:46 AM      CT ABDOMEN PELVIS WO CONTRAST Additional Contrast? None   Final Result   1.  Almost complete resolution of previously seen airspace infiltrates in the lung bases. 2. No acute abdominal or pelvic abnormalities. **This report has been created using voice recognition software. It may contain minor errors which are inherent in voice recognition technology. **      Final report electronically signed by Dr. Angi King on 10/20/2020 10:50 AM      CT HEAD WO CONTRAST   Final Result    No evidence of acute intracranial abnormality. **This report has been created using voice recognition software. It may contain minor errors which are inherent in voice recognition technology. **      Final report electronically signed by Dr. Thanh Macias MD on 10/20/2020 10:13 AM      XR CHEST PORTABLE   Final Result   Ill-defined bilateral lung opacities. Follow-up as clinically indicated. This document has been electronically signed by: Josué Walker MD on 10/20/2020 05:38 AM         XR CHEST PORTABLE   Final Result   Minimal residual atelectasis and/or infiltrate within the bilateral mid    and lower lungs with improvement. Improved pulmonary inflation. This document has been electronically signed by: Lazaro Barker MD on    10/16/2020 02:02 AM         XR CHEST PORTABLE   Final Result   Impression:   Progressive bilateral consolidations or ARDS. This document has been electronically signed by: Taylor Corey MD on    10/12/2020 04:59 AM         XR CHEST PORTABLE   Final Result    Similar bilateral ill-defined pneumonia. This document has been electronically signed by: Geovani Rey. Yaima Caceres DO on    10/11/2020 09:49 AM         XR CHEST PORTABLE   Final Result   Similar diffuse patchy bilateral airspace disease and consolidations. Support devices as above. This document has been electronically signed by: Jasmyne Méndez MD on    10/10/2020 04:35 AM         XR CHEST PORTABLE   Final Result   No acute findings. This document has been electronically signed by:  Josué Walker MD on 10/08/2020 07:40 AM CT ABDOMEN PELVIS WO CONTRAST Additional Contrast? Oral   Final Result    IMPRESSION:   Bilateral lower lobe pneumonia. Known Covid. Continued progress imaging is advised   Distended colon with fluid, air and stool. Correlate for diarrhea. **This report has been created using voice recognition software. It may contain minor errors which are inherent in voice recognition technology. **      Final report electronically signed by Dr. Leola Martin on 10/7/2020 6:49 PM      XR CHEST PORTABLE   Final Result   Bilateral lung consolidation not significant change. This document has been electronically signed by: Robert Melendez MD on 10/07/2020 07:25 AM         US RENAL COMPLETE   Final Result   Unremarkable kidneys. This document has been electronically signed by: Terra Ramírez MD on    10/07/2020 01:48 AM         XR CHEST PORTABLE   Final Result   1. There is a new endotracheal tube with the distal tip 1.8 cm above the level of the madi. 2. There is a new esophageal tube with the distal tip projecting over the gastric fundus. 3. There is a stable right PICC with the distal tip projecting over the right atrium. 4. The lung volumes are diminished. There are bilateral perihilar and the basilar opacities which are similar to the previous examination however may be accentuated by the expiratory technique. There is no pleural effusion. Follow-up chest radiographs    are recommended to confirm complete resolution. **This report has been created using voice recognition software. It may contain minor errors which are inherent in voice recognition technology. **      Final report electronically signed by Dr. Manuel Brown on 10/6/2020 7:18 AM      XR CHEST PORTABLE   Final Result   Bilateral lung opacities. Follow-up to clearing recommended. This document has been electronically signed by:  Robert Melendez MD on 10/06/2020 06:09 AM         XR CHEST PORTABLE   Final Result   Slightly decreased diffuse patchy bilateral airspace disease. Support devices as above. This document has been electronically signed by: Elder Workman MD on    10/03/2020 05:15 AM         XR CHEST PORTABLE   Final Result   ET tube should be retracted 1.5-2.0 cm. No significant change in coarse scattered bilateral infiltrates. This document has been electronically signed by: Francis Vera MD on    09/30/2020 06:50 AM         XR CHEST PORTABLE   Final Result      Slightly improving bilateral pneumonia. **This report has been created using voice recognition software. It may contain minor errors which are inherent in voice recognition technology. **      Final report electronically signed by Dr. Gricelda Nunez on 9/27/2020 2:23 PM      XR ABDOMEN FOR NG/OG/NE TUBE PLACEMENT   Final Result   Esophageal route tube tip in the stomach. **This report has been created using voice recognition software. It may contain minor errors which are inherent in voice recognition technology. **      Final report electronically signed by Dr Og Luke on 9/26/2020 10:22 AM      XR CHEST PORTABLE   Final Result   1. Lines and tubes as above. 2. Worsening bilateral pneumonia. **This report has been created using voice recognition software. It may contain minor errors which are inherent in voice recognition technology. **      Final report electronically signed by Dr. Gricelda Nunez on 9/24/2020 7:38 AM      XR CHEST PORTABLE   Final Result   1. Endotracheal tube terminates 3.1 cm above the madi. 2.  Orogastric tube in the stomach. 3.  Patchy opacities in the right upper lobe and lingula. This document has been electronically signed by: Shi Irby MD on    09/24/2020 12:35 AM         XR CHEST PORTABLE   Final Result   1. Bilateral pulmonary opacification worse than on previous study dated 10 September 2020.  This may represent worsening inflammatory process, possibly Covid 19 infection. Please correlate clinically   2. Borderline cardiomegaly. **This report has been created using voice recognition software. It may contain minor errors which are inherent in voice recognition technology. **      Final report electronically signed by DR Gala Rock on 9/23/2020 10:47 AM           DVT prophylaxis: Lovenox, held for GI bleed, initiate SCDs, May consider adding in Lovenox again. Code Status: Limited    PT/OT Eval Status: Not Consulted    Active Hospital Problems    Diagnosis Date Noted    Sepsis due to COVID-19 ([address] [U07.1, A41.89]     KRISTY (acute kidney injury) ([address] [N17.9]     Hypernatremia [E87.0]     Acute encephalopathy [G93.40]     Acute on chronic anemia [D64.9]     Hypomagnesemia [E83.42]     Dysphagia [R13.10]     Diarrhea [R19.7]     At risk for malnutrition [Z91.89]     Reactive thrombocytosis [R79.89]     Pneumonia due to COVID-19 virus [U07.1, J12.89] 10/18/2020    ARF (acute renal failure) with tubular necrosis (HCC) [N17.0]     Hypokalemia [E87.6]     Other hypotension [I95.89]     Acute respiratory failure with hypoxia ([address] [J96.01]     COVID-19 virus infection [U07.1] 09/06/2020    Essential hypertension [I10]        Thank you Tre Ramírez MD for the opportunity to be involved in this patient's care.     Electronically signed by Imelda Lucero MD on 11/3/2020 at 2:29 PM

## 2020-11-03 NOTE — PROGRESS NOTES
[facility]    Renal Inpatient Follow-up note  11/3/2020 8:38 AM       Pt Name:   Ginger Stringer  YOB: 1970  Attending:   Karan Hui MD     Chief Complaint : Ginger Stringer is a 44 y.o. male beingfollowed by nephrology for Acute Kidney Injury     Hospital Course:   Jaxson Allison is a 15-year-old male never smoker with a past medical history significant for DURAN KIARA, HLD, HTN, HFpEF, GERD, DM 2, gout, CAD, BPH, atrial fibrillation on chronic anticoagulation who presented to [facility] on 9/23/2020 for worsening shortness of breath. Patient was known to have had previous hospitalization on 9/6/2020 through 9/15/2020 for hypoxemic respiratory failure secondary to COVID-19 pneumonia. Patient was noted to have completed a course of Decadron, remdesivir, and danazol and was discharged home on subcutaneous Lovenox. Patient quickly deteriorated requiring intubation and underwent subsequent bronchoscopy on 9/27/2020 which demonstrated no mucus production. Patient was extubated on 10/2/2020 and reintubated for progressive respiratory failure and obtundation on 10/6/2020. Patient was noted to have acute oliguric renal failure secondary to prerenal etiology with suspicion for sepsis. Patient underwent volume resuscitation but urine output did not improve. Patient underwent empiric treatment with Zosyn and doxycycline and sputum was shown to have grown staph aureus. Patient was extubated on 10/15/2020 following his trial spontaneous breathing. Interval History : Patient seen and evaluated at bedside this morning. Patient reports that he has been having some complaints of urinary retention. Patient did have his indwelling Dimas removed in the last few days and has since been receiving intermittent straight cath. Discussed case with nursing and primary team.  We will plan for bladder scan at noon and intermittent straight cathing.   Urine studies ordered. Patient otherwise denies any chest pain, shortness of breath, abdominal pain, diarrhea, headache, nausea and Vomiting     Scheduled Medications :   [Held by provider] vancomycin  1,500 mg Intravenous Q36H    darbepoetin alejandro-polysorbate  60 mcg Subcutaneous Weekly - Thursday    pantoprazole  40 mg Intravenous BID    vancomycin (VANCOCIN) intermittent dosing (placeholder)   Other RX Placeholder    sodium chloride flush  10 mL Intravenous 2 times per day    ferrous sulfate  325 mg Oral BID WC    insulin lispro  0-12 Units Subcutaneous Q6H    piperacillin-tazobactam  3.375 g Intravenous Q8H    [Held by provider] gabapentin  400 mg Oral BID    modafinil  100 mg Oral Daily    [Held by provider] enoxaparin  40 mg Subcutaneous BID    insulin glargine  38 Units Subcutaneous Nightly    lidocaine 1 % injection  5 mL Intradermal Once    magnesium replacement protocol   Other RX Placeholder    phosphorus replacement protocol   Other RX Placeholder    calcium replacement protocol   Other RX Placeholder    [Held by provider] ARIPiprazole  15 mg Oral Daily    [Held by provider] aspirin  81 mg Oral Daily    glycopyrrolate-formoterol  2 puff Inhalation BID      sodium chloride      dextrose         Vitals :  /79   Pulse 97   Temp 98.1 °F (36.7 °C) (Oral)   Resp 20   Ht 5' 8\" (1.727 m)   Wt 288 lb 9.6 oz (130.9 kg)   SpO2 93% Comment: above goal, no oxygen needed  BMI 43.88 kg/m²     24HR INTAKE/OUTPUT:      Intake/Output Summary (Last 24 hours) at 11/3/2020 6399  Last data filed at 11/3/2020 0026  Gross per 24 hour   Intake 1546.72 ml   Output 1300 ml   Net 246.72 ml        Physical Exam :  General appearance: Patient is a middle-aged obese male lying in bed in no acute distressHEENT: Pupils equal, round, and reactive to light. Conjunctivae/corneas clear. Neck: No jugular venous distention. Trachea midline. Respiratory:  Normal respiratory effort.  Clear to auscultation, bilaterally without Rales/Wheezes/Rhonchi. Cardiovascular: Regular rate and rhythm with normal S1/S2 without murmurs, rubs or gallops. Abdomen: Protuberant, soft, non-tender, non-distended with normal bowel sounds. Musculoskeletal: passive and active ROM x 4 extremities. Skin: Skin color, texture, turgor normal.  No rashes or lesions. Neurologic:  Neurovascularly intact without any focal sensory/motor deficits. Grossly non-focal.  Psychiatric: Alert and oriented, thought content appropriate, normal insight  Capillary Refill: Brisk,< 3 seconds   Peripheral Pulses: +2 palpable, equal bilaterally      Last 3 CBC  Recent Labs     11/01/20  0550 11/02/20  0852 11/03/20  0602   WBC 9.5 9.1 8.4   RBC 3.12* 3.14* 3.22*   HGB 9.2* 9.4* 9.5*   HCT 30.3* 30.7* 31.2*   MCV 97.1* 97.8* 96.9*   * 410* 383     Last 3 CMP  Recent Labs     11/01/20  0550 11/02/20  0852 11/03/20  0602    141 142   K 4.3 4.0 3.9    100 103   CO2 25 26 24   BUN 14 14 11   CREATININE 1.5* 1.8* 2.0*   CALCIUM 10.5 10.0 9.8        Assessment / Plan :  1. Severe pneumonia: Initially resolved, now having intermittent fevers. Patient has complicated medical course. Had discharge on 9/15/2020 for COVID-19 pneumonia and was readmitted on 9/23/2020 requiring intubation. Patient has been on vancomycin and Zosyn for 10+ days. Respiratory panel negative. Respiratory cultures negative. Infectious diseases following. 2. KRISTY on CKD: Nephrology reconsulted for worsening KRISTY. Originally thought to be prerenal in the context of sepsis pneumonia. Suspect postobstructive uropathy, patient had catheter removed yesterday and history of urinary retention. .  Has since been requiring intermittent straight cath. Bladder scan this afternoon. Urine studies pending. 3. Urinary retention: Secondary to BPH patient has had chronic urinary retention during admission requiring Dimas catheter. Dimas was noted to been removed few days ago.   Patient has been requiring intermittent straight cathing with elevated creatinine. Recommend resuming patient's home BPH medications as patient is on Flomax at home and follow-up with urology outpatient. 4. Acute normocytic Anemia: due to rectal bleeding in the context of rectal ulcer. Hemoglobin stable and rising. H&H 9.5/31.2 today  5. HFpEF: Patient has been receiving intermittent diuresis and is on home Lasix  6. Dysphagia: Patient was originally on TPN on 10/21/2020 as patient had failed swallow study following extubation. Patient was not a good candidate for PEG due to BiPAP therapy. Patient was reassessed on 10/30/2020 with barium swallow found to have no aspiration and was transitioned to solid food following unplanned PICC line removal on 10/31/2020. Case and plan discussed in conjunction with Dr Nancy Marti M.D  [facility].     Dr.Benjamin HUSEYIN Salomon,DO

## 2020-11-03 NOTE — CARE COORDINATION
Update:  Nephrology consult today for elevated Creatinine 2 (1.8) worsening, IV AB, IVF continued; weakening criteria for LTACH, await Toy review; collaborated with [facility]  for backup SNF plan (father Juliet Haynes is decision maker in [place])  Electronically signed by Jazzy Ocampo RN on 11/3/2020 at 2:01 PM

## 2020-11-03 NOTE — PROGRESS NOTES
Pharmacy Vancomycin Consult     Vancomycin Day: 12  Current Dosin mg IV Q36H (on hold)    Temp max: 99.4     Recent Labs     20  0852 20  0602   BUN 14 11       Recent Labs     20  0852 20  0602   CREATININE 1.8* 2.0*       Recent Labs     20  0852 20  0602   WBC 9.1 8.4         Intake/Output Summary (Last 24 hours) at 11/3/2020 1245  Last data filed at 11/3/2020 0942  Gross per 24 hour   Intake 1566.72 ml   Output 1300 ml   Net 266.72 ml       Date Source Result   10/12/2020 ETT MRSA   10/12/2020 PNA PCR panel negative   10/22/2020 BC1 Staph coag neg  [facility] -nothing detected   10/24/20  UC Negative   10/28/20 BC2 NGTD       Ht Readings from Last 1 Encounters:   20 5' 8\" (1.727 m)        Wt Readings from Last 1 Encounters:   20 288 lb 9.6 oz (130.9 kg)         Body mass index is 43.88 kg/m². Estimated Creatinine Clearance: 57 mL/min (A) (based on SCr of 2 mg/dL (H)). Trough: 13.4    Assessment/Plan:   Vancomycin 1500 mg IV once. Will consider rechecking random level in 48 hours if vancomycin continued. Original plan was 2 weeks of vancomycin.      Shannan Schilling, PharmD   11/3/2020, 4:10 PM

## 2020-11-04 LAB
ANION GAP SERPL CALCULATED.3IONS-SCNC: 14 MEQ/L (ref 8–16)
BASOPHILS # BLD: 0.8 %
BASOPHILS ABSOLUTE: 0.1 THOU/MM3 (ref 0–0.1)
BUN BLDV-MCNC: 9 MG/DL (ref 7–22)
CALCIUM SERPL-MCNC: 10.2 MG/DL (ref 8.5–10.5)
CHLORIDE BLD-SCNC: 105 MEQ/L (ref 98–111)
CO2: 26 MEQ/L (ref 23–33)
CREAT SERPL-MCNC: 1.7 MG/DL (ref 0.4–1.2)
EOSINOPHIL # BLD: 5.9 %
EOSINOPHILS ABSOLUTE: 0.5 THOU/MM3 (ref 0–0.4)
ERYTHROCYTE [DISTWIDTH] IN BLOOD BY AUTOMATED COUNT: 16.7 % (ref 11.5–14.5)
ERYTHROCYTE [DISTWIDTH] IN BLOOD BY AUTOMATED COUNT: 59.2 FL (ref 35–45)
GFR SERPL CREATININE-BSD FRML MDRD: 51 ML/MIN/1.73M2
GLUCOSE BLD-MCNC: 103 MG/DL (ref 70–108)
GLUCOSE BLD-MCNC: 112 MG/DL (ref 70–108)
GLUCOSE BLD-MCNC: 113 MG/DL (ref 70–108)
GLUCOSE BLD-MCNC: 114 MG/DL (ref 70–108)
GLUCOSE BLD-MCNC: 127 MG/DL (ref 70–108)
HCT VFR BLD CALC: 30.8 % (ref 42–52)
HEMOGLOBIN: 9.5 GM/DL (ref 14–18)
IMMATURE GRANS (ABS): 0.04 THOU/MM3 (ref 0–0.07)
IMMATURE GRANULOCYTES: 0.5 %
LYMPHOCYTES # BLD: 14.7 %
LYMPHOCYTES ABSOLUTE: 1.2 THOU/MM3 (ref 1–4.8)
MCH RBC QN AUTO: 29.9 PG (ref 26–33)
MCHC RBC AUTO-ENTMCNC: 30.8 GM/DL (ref 32.2–35.5)
MCV RBC AUTO: 96.9 FL (ref 80–94)
MONOCYTES # BLD: 9.8 %
MONOCYTES ABSOLUTE: 0.8 THOU/MM3 (ref 0.4–1.3)
NUCLEATED RED BLOOD CELLS: 0 /100 WBC
PLATELET # BLD: 378 THOU/MM3 (ref 130–400)
PMV BLD AUTO: 10.5 FL (ref 9.4–12.4)
POTASSIUM SERPL-SCNC: 3.7 MEQ/L (ref 3.5–5.2)
RBC # BLD: 3.18 MILL/MM3 (ref 4.7–6.1)
SEG NEUTROPHILS: 68.3 %
SEGMENTED NEUTROPHILS ABSOLUTE COUNT: 5.7 THOU/MM3 (ref 1.8–7.7)
SODIUM BLD-SCNC: 145 MEQ/L (ref 135–145)
WBC # BLD: 8.4 THOU/MM3 (ref 4.8–10.8)

## 2020-11-04 PROCEDURE — 6360000002 HC RX W HCPCS: Performed by: FAMILY MEDICINE

## 2020-11-04 PROCEDURE — 6370000000 HC RX 637 (ALT 250 FOR IP): Performed by: INTERNAL MEDICINE

## 2020-11-04 PROCEDURE — 94760 N-INVAS EAR/PLS OXIMETRY 1: CPT

## 2020-11-04 PROCEDURE — 97530 THERAPEUTIC ACTIVITIES: CPT

## 2020-11-04 PROCEDURE — 36600 WITHDRAWAL OF ARTERIAL BLOOD: CPT

## 2020-11-04 PROCEDURE — 94660 CPAP INITIATION&MGMT: CPT

## 2020-11-04 PROCEDURE — 97535 SELF CARE MNGMENT TRAINING: CPT

## 2020-11-04 PROCEDURE — 82948 REAGENT STRIP/BLOOD GLUCOSE: CPT

## 2020-11-04 PROCEDURE — 36415 COLL VENOUS BLD VENIPUNCTURE: CPT

## 2020-11-04 PROCEDURE — 2580000003 HC RX 258: Performed by: FAMILY MEDICINE

## 2020-11-04 PROCEDURE — 6370000000 HC RX 637 (ALT 250 FOR IP): Performed by: NURSE PRACTITIONER

## 2020-11-04 PROCEDURE — 2060000000 HC ICU INTERMEDIATE R&B

## 2020-11-04 PROCEDURE — C9113 INJ PANTOPRAZOLE SODIUM, VIA: HCPCS | Performed by: FAMILY MEDICINE

## 2020-11-04 PROCEDURE — 97110 THERAPEUTIC EXERCISES: CPT

## 2020-11-04 PROCEDURE — 85025 COMPLETE CBC W/AUTO DIFF WBC: CPT

## 2020-11-04 PROCEDURE — 99232 SBSQ HOSP IP/OBS MODERATE 35: CPT | Performed by: INTERNAL MEDICINE

## 2020-11-04 PROCEDURE — 94640 AIRWAY INHALATION TREATMENT: CPT

## 2020-11-04 PROCEDURE — 80048 BASIC METABOLIC PNL TOTAL CA: CPT

## 2020-11-04 PROCEDURE — 6370000000 HC RX 637 (ALT 250 FOR IP): Performed by: STUDENT IN AN ORGANIZED HEALTH CARE EDUCATION/TRAINING PROGRAM

## 2020-11-04 PROCEDURE — 2580000003 HC RX 258: Performed by: INTERNAL MEDICINE

## 2020-11-04 RX ORDER — DEXTROSE MONOHYDRATE 25 G/50ML
12.5 INJECTION, SOLUTION INTRAVENOUS PRN
Status: DISCONTINUED | OUTPATIENT
Start: 2020-11-04 | End: 2020-11-06 | Stop reason: HOSPADM

## 2020-11-04 RX ORDER — DEXTROSE MONOHYDRATE 50 MG/ML
100 INJECTION, SOLUTION INTRAVENOUS PRN
Status: DISCONTINUED | OUTPATIENT
Start: 2020-11-04 | End: 2020-11-06 | Stop reason: HOSPADM

## 2020-11-04 RX ORDER — GABAPENTIN 400 MG/1
400 CAPSULE ORAL ONCE
Status: COMPLETED | OUTPATIENT
Start: 2020-11-04 | End: 2020-11-04

## 2020-11-04 RX ORDER — NICOTINE POLACRILEX 4 MG
15 LOZENGE BUCCAL PRN
Status: DISCONTINUED | OUTPATIENT
Start: 2020-11-04 | End: 2020-11-06 | Stop reason: HOSPADM

## 2020-11-04 RX ADMIN — SODIUM CHLORIDE, PRESERVATIVE FREE 10 ML: 5 INJECTION INTRAVENOUS at 08:40

## 2020-11-04 RX ADMIN — FERROUS SULFATE TAB 325 MG (65 MG ELEMENTAL FE) 325 MG: 325 (65 FE) TAB at 18:07

## 2020-11-04 RX ADMIN — Medication 10 ML: at 22:35

## 2020-11-04 RX ADMIN — SODIUM CHLORIDE: 9 INJECTION, SOLUTION INTRAVENOUS at 22:14

## 2020-11-04 RX ADMIN — FERROUS SULFATE TAB 325 MG (65 MG ELEMENTAL FE) 325 MG: 325 (65 FE) TAB at 08:40

## 2020-11-04 RX ADMIN — GLYCOPYRROLATE AND FORMOTEROL FUMARATE 2 PUFF: 9; 4.8 AEROSOL, METERED RESPIRATORY (INHALATION) at 07:48

## 2020-11-04 RX ADMIN — GABAPENTIN 400 MG: 400 CAPSULE ORAL at 13:45

## 2020-11-04 RX ADMIN — MODAFINIL 100 MG: 100 TABLET ORAL at 05:51

## 2020-11-04 RX ADMIN — PANTOPRAZOLE SODIUM 40 MG: 40 INJECTION, POWDER, FOR SOLUTION INTRAVENOUS at 08:40

## 2020-11-04 RX ADMIN — PANTOPRAZOLE SODIUM 40 MG: 40 INJECTION, POWDER, FOR SOLUTION INTRAVENOUS at 22:27

## 2020-11-04 RX ADMIN — GLYCOPYRROLATE AND FORMOTEROL FUMARATE 2 PUFF: 9; 4.8 AEROSOL, METERED RESPIRATORY (INHALATION) at 17:54

## 2020-11-04 ASSESSMENT — PAIN SCALES - GENERAL
PAINLEVEL_OUTOF10: 2
PAINLEVEL_OUTOF10: 0

## 2020-11-04 NOTE — FLOWSHEET NOTE
11/04/20 1241   Provider Notification   Reason for Communication Evaluate; Review case  (Patient having pain, numbness and tingling in left lower kell)   Provider Name Dr. Alexsander Garcia   Provider Notification Resident   Method of Communication Secure Message   Response Waiting for response   Notification Time [id number]    ordered to give Gabapentin- see orders.

## 2020-11-04 NOTE — PROGRESS NOTES
[facility] ICU STEPDOWN TELEMETRY 4K  Occupational Therapy  Daily Note  Time:   Time In: 1130  Time Out: 1210  Timed Code Treatment Minutes: 40 Minutes  Minutes: 40          Date: 2020  Patient Name: Joy Lee,   Gender: male      Room: [facility]10/010-A  MRN: [id number]  : 1970  (44 y.o.)  Referring Practitioner: Dr. Beryle Glenn  Diagnosis: Acute Respiratory Failure with Hypoxemia  Additional Pertinent Hx: Pt had been hospitalized previous in the month with COVID-19. He was discharged home and had to be readmitted with increased SOB and sputum production. He had deteriorating breathing function and hand to be intubated for 10 days before extubation on 10/2/20. Pt re-intubated and extubation on 10/19/20. Restrictions/Precautions:  Restrictions/Precautions: General Precautions, Fall Risk     SUBJECTIVE: Pleasant and cooperative. Pt was sitting in the chair. He asked to return to the bed. He needed rest breaks during session. PAIN: L foot/ankle pain reported. Constant. COGNITION: Slow Processing, Decreased Problem Solving and Decreased Safety Awareness  Pt was lethargic after having be repositioned in the bed. ADL:   Grooming: with set-up. wiping his face after having help to wring it out. [place] BALANCE:  Sitting Balance:  Stand By Assistance. Scooting to edge of chair  Standing Balance: Minimal Assistance, X 2. starting to take steps; cues for posture provided    BED MOBILITY:  Sit to Supine: Moderate Assistance, Maximum Assistance, X 2 help needed for lifting his legs into the bed and directing his trunk down to his side  Scooting: Minimal Assistance, X 2 scooting toward his R side while at the edge of bed    TRANSFERS:  Sit to Stand:  Minimal Assistance, Moderate Assistance, X 2, with increased time for completion, with verbal cues, to/from chair with arms.   Pt did 2 trials with MOD A x 2 for first trial and MOD A x 1, MIN A x 1 for the second trial  Stand to Sit: Minimal Assistance, Moderate Assistance, X 2. to the edge of bed. Pt also sat himself back into the chair with MIN A x 2    FUNCTIONAL MOBILITY:  Assistive Device: Rolling Walker  Assist Level: Moderate Assistance and X 2. Distance: 3 ft from chair to the bed   Pt needed cues for shifting his weight and using the walker to help with relieving the pressure on his L foot. ADDITIONAL ACTIVITIES:  Patient completed BUE strengthening exercises x 5 reps x 1 sets this date with a light object in his hand for shoulder flexion and elbow extension in order to increase strength and improve activity tolerance required for functional toilet and shower transfers and ADL routine. Pt had a lighter object in his L hand and very slow movements noted. Patient tolerated fair. ASSESSMENT:   Pt is progressing slowly with his ADLs, functional mobility and strengthening. He has had a long hospitalization and had to be intubated x 2 during the admission. Pt is on room air at this time. Setup A for feeding tasks and MIN A for grooming at this time. Weakness noted which is more on his L than his R upper extremity. Pt C/O having pain in his L foot/ankle which limits his standing and walking. Activity Tolerance:  Patient tolerance of  treatment: fair. Pt needed rest breaks between activities. Pt is breathing room air at this time. Discharge Recommendations: Patient would benefit from continued therapy after discharge, Subacute/Skilled Nursing Facility    Equipment Recommendations:  Other: assess in discharge environment  Plan: Times per week: 3-5x  Current Treatment Recommendations: Strengthening, Patient/Caregiver Education & Training, Balance Training, Functional Mobility Training, Endurance Training, Safety Education & Training, Self-Care / ADL    Patient Education  Patient Education: Home Exercise Program and Importance of Increasing Activity    Goals  Short term goals  Time Frame for Short term goals: by discharge  Short term goal 1: Pt to sit EOB wit consistent balance of mod A x1 and CGA x1 > 12 min to increase endurance and core strength for participatin in ADL tasks. Met. Revise goal.  Short term goal 2: pt to increase UB strength and endurance of bilateral UEs by being able to faraz for and touch face with right UE to increase ability for self feeding. Met. Revise goal.   Short term goal 3: Pt to complete simple grooming tsks with mod A x1. Met. Revise goal.  Short term goal 4: OTR to further assess  Revised Short-Term Goals  Short term goals  Time Frame for Short term goals: by discharge  Short term goal 1: Pt to sit forward in the chair while completing upper body ADLs for 5 minutes with CGA if needed to increase endurance and core strength for participatin in ADL tasks  Short term goal 2: Pt will increase his BUE strength and endurance with ROM/light resistance exercises to complete an upper body dressing or bathing activity. Short term goal 3: Pt will complete transfers to/from the chair or commode with MIN A x 1 to increase his independence with toileting routine. Short term goal 4: Pt will demonstrate functional mobility with a rolling walker to/from the chair or bedside commode with MIN A x 2 to prepare for doing sef care while out of bed. Following session, patient left in safe position with all fall risk precautions in place.

## 2020-11-04 NOTE — PROGRESS NOTES
returned (+) for Gram Positive Cocci in clusters, however there was no other culture to compare it against.  Respiratory Panel was negative. LA normal, ProCal 4.58 on 10/23/20. Dr. Ambrose Mcintosh following, appreciate his assistance and will follow his recommendations. Antibiotics planned to continue for one week starting on 10/28.  -Leukocytosis resolved, afebrile, on room air, good sats  -Chest x-ray 11/01/20: Persistent mild bilateral patchy pulmonary opacities. -Repeat CBC qd  -Antibiotics and procalcitonin per ID/ following, appreciate his assistance. 2.) Acute hypoxic respiratory failure due to COVID-19 pneumonia:  Intubated on 10/6/2020, extubated on 10/15/2020. Was on room air until 10/24, when patient required 2L NC, was placed in ICU on 10/25 for Hypotension, and was started on BiPAP at that time. Began to wean on 10/28. Patient sating at 95% without the mask on 10/30. KIARA on BiPAP -patient needs to be on BiPAP whenever sleeping, including naps, and often while awake. Patient was offered tracheostomy but family refused to consider this option previously. 3.) KRISTY likely obstructive vs prerenal vs AIN:  Creatinine 1.8 - judicious fluid hydration. Trended up to 4.1 on 10/7/2020. Baseline creatinine 0.6 to 0.8. Nephrology consulted. Started IVF hydration, Cr trended down to 1.5 on 10/24, and has remained stable around 1.3-1.5. Nephrology suggested this is a new baseline. Monitor BMP qd  -Patient's creatinine went up to 1.8 on 11/2/2020  -1000 mL fluid bolus was ordered during the day  -Dimas needed to be reinserted due to urinary retention, renal function improving  -nephrology consulted appreciate their assistance. -Nephrology ordered urinary indices  -Tamsulosin restarted    4.) Hypernatremia, likely due to dehydration (Resolved): Off D5W, patient had episode of hypernatremia of 146 on 10/23. Started back on 0.45% NS as per Nephrology. TPN stopped, diet advanced.  Monitoring BMP qd  - off TPN, patient is eating    5.) Acute encephalopathy, etiology unclear, likely related to recent Covid 19 infection: 10/23 saw Worsening mentation today with fevers, likely septic due to unknown etiology. Infectious causes include rectal wound, pneumonia, PICC line infection. CT head on 10/20/2020 unremarkable. Patient is still lethargic although mentation at baseline. 6.) Acute on chronic Normocytic anemia due to acute rectal bleeding- secondary to gluteal/rectal ulcerations vs hemodilutional from IVF, (Improving):   -Hemoglobin stable. Baseline Hgb 7-8 in Oct 2020 and around 11 in Sep 2020  -No recent BRBPR, melena. Reportedly patient had clots per rectal tube on October 12 and 3 BM's on Oct 25th w/clots. Patient does have rectal bleed from rectal ulcer from Flexi-Seal. GI stated that supportive measures only would be preformed for the perianal ulcers. States increased PO feeds may worsen the ulcers. Aranesp started on 10/30 by GI for Anemia of Chronic Diseases. -Patient received a total of 6 units so far  -Continue to monitor daily CBC, transfuse per unit protocol    7.) Hypokalemia due to hypomagnesemia and diarrhea, (Resolved):   -Replace per protocol, BMP in am .  -Will restart Effer- K 40 meq BID, if potassium drops while off TPN     8.) Hypomagnesemia likely due to poor oral intake and diarrhea, (Resolved):   -Replace per protocol, check magnesium level in am.     9.) Dysphagia, At risk for Malnutrition: Patient failed Fiberoptic Endoscopic Evaluation of the Swallow (FEES). ST recommend NPO, per ST NGT not an option right now due to failed FEES. Started TPN temporarily on 10/21. Followed dietary recommendation for Free Water addition. GI stated patient not a good candidate for PEG unless off of BiPAP, and father does not want a tracheostomy performed at this time. On 10/30, patient was without his BiPAP all day with good saturation.   Speech Therapy reassessed with Barium Swallow and found no aspiration. Stated to wean TPN and transition to solid food. 10/31: TPN removed due to accidental PICC line removal      - Patient tolerating oral food, hold TPN at this time      - GI stated potential increase of rectal bleed during transition     10.) Diarrhea (Resolved): C. diff test ordered by GI, but not done. May repeat if continue diarrhea.     11.) Sarahi-rectal lesion: Wound ostomy on-board, appreciate input. Cont zinc oxide as ordered.      12. ) Hx of KIARA: Noncompliant with CPAP at home, Pulmonology consulted. Use BiPAP at night. Settings: PEEP 6cm H2O              FiO2 30%              O2 flow rate 2 L/min     13.)  Diabetes mellitus type 2:  Blood sugars within goal range of 140-180. Accu-Cheks q. 4 and at bedtime, Hypoglycemia protocol in place   -holding 38 units Lantus nightly   -POC glucose frequency reduced to q12hrs     14.)  Essential hypertension:  Uncontrolled on admission due to pt not receiving PO meds ( on amlodipine, cardizem and hydralazine PO at home). IV hydralazine prn ordered. Avoid hypotension with recent sepsis and renal failure. SBP stable at 110     15.) Physical Deconditioning:  Patient likely will need SNF versus LTAC at discharge. Faye evaluation consult ordered. Palliative care following in discussed case with father who primary decision-maker.     16.) Thrombocytosis, likely reactive due to recent COVID-19 (Resolved): , recheck with CBC in am     17.) Mild hypercalcemia, likely from dehydration (Resolved): Ca 9.0, Continue IVF per Nephrology. Recheck with daily BMP.     18.) Urinary retention: On mason cath, US negative for infection. Disposition Plan: [facility]. Patient is clinically much improved, consider ECF or Physical Rehab center.      History Of Present Illness:      Mr. Nicole Kerr is a  44year-old morbidly obese black male lifetime non-smoker. Rick Mclaughlin has a history of morbid obesity associated with type 2 diabetes been succesful in taking it off occasionally, but he desatruates and needs it placed back on. He hit is toe against the bedrail and ripped off his right toenail. The nurse kept it in a sample cup. His HgB is at 7.2 (From 7.6 the night prior). Will recheck HgB later today and transfuse if necsessary. GI states only conservative management can be done for the perianal ulcer at this time. They also state that the patient is not a good candidate for PEG tube placement unless he is off of BiPAP. The patient's father does not want to place a tracheostomy at this time. Patient till on TPN. 10/28: Patient able to answer some questions today. Still lethargic on exam.  He is still removing his BiPAP frequently, although his saturation drops below 90 when it is not on. There were no reported episodes of clots passed overnight. He had a BM that was mostly green in consistency. He required 1 unit of PRBC overnight, HgB currently stable at 7.9. Pre Cert was denied for Faye, starting appeal process. Antibiotics to continue for another week. Nothing else can be done at this time except transfusions and antibiotics. POA states no trach, which means that he will be unable to get a PEG tube. 10/29: Patient asleep for most of exam.  No episodes of Rectal Bleeding seen. Nurses state he is still trying to take the BiPAP off, but quickly desaturates. No units of PRBC had to be transfused. HgB increased to 8.4. Day 2 of appeal for [facility]. No current change in treatment plans. 10/30: Patient asleep for most of exam, however he is able saturating well without the BiPAP. Will still need BiPAP at night for KIARA. No further transfusions of clots passed overnight. HgB stable. Day 3 of appeal for [facility]. Since off of BiPAP, Speech Therapy has been consulted to assess dysphagia and ability to eat PO. A barium swallow shows no aspiration.   ST states a 2 week long term goal to return patient to functional eating status. Will continue antibiotics. 10/31: Overnight event reported, Patient reported to have fallen out of bed around 0620. The fall was unwitnessed and he was found on the ground face down. He stated no loss of consciousness, and denied hitting his head. The PICC line was pulled out during the incident. He was helped back into bed, and reported no headaches, neck pain, or back pain. There are were no visible injuries. Patient states he is feeling well this morning and is saturating well without the BiPAP. He states that he \"tripped\" out of bed in the morning, but did not hit his head. He denies headaches, chest pain, shortness of breath, nausea, vomiting, diarrhea, neck pain, or back pain. He is able to eat solid food, and TPN is now on hold due to no IV access. Will order an IV line to be placed, continue antibiotics. Past Medical History, Past Surgical History, Allergies, Medications, Social History, Family History reviewed in H&P and remain unchanged from admission. Diet:  DIET DYSPHAGIA MINCED AND MOIST; Carb Control: 3 carb choices (45 gms)/meal; No Added Salt (3-4 GM);  No Drinking Straw  Dietary Nutrition Supplements: Diabetic Oral Supplement    Review of Systems:      Review of Systems - General ROS: negative for - chills, fatigue or fever  Respiratory ROS: no cough, shortness of breath, or wheezing  Cardiovascular ROS: no chest pain or dyspnea on exertion  Gastrointestinal ROS: no abdominal pain, change in bowel habits, or black or bloody stools  Genito-Urinary ROS: no dysuria, trouble voiding, or hematuria  Musculoskeletal ROS: negative for - joint stiffness, joint swelling, muscle pain or muscular weakness  Neurological ROS: negative for - confusion, dizziness, headaches, seizures or tremors  Dermatological ROS: negative for - pruritus, rash or skin lesion changes    Physical exam:    /84   Pulse 107   Temp 97.9 °F (36.6 °C) (Oral)   Resp 16   Ht 5' 8\" (1.727 m)   Wt I.V.:1467.4]  Out: 725 [Urine:725]  No intake/output data recorded. Radiology:     CXR 10/23: I have reviewed the CXR with the following interpretation: Poor inflation of lungs, borderline heart size, no effusions, PICC line right with catheter tip in cavoatrial junction, mild infiltrate scattering in both lungs, worsened appearance from prior study    XR CHEST PORTABLE   Final Result   Persistent mild bilateral patchy pulmonary opacities. **This report has been created using voice recognition software. It may contain minor errors which are inherent in voice recognition technology. **      Final report electronically signed by Dr. Ag Cuevas on 11/1/2020 1:48 PM      FL MODIFIED BARIUM SWALLOW W VIDEO   Final Result   1. Laryngeal penetration of thin barium without evidence of aspiration. 2. Additional recommendations from the speech therapist will follow. **This report has been created using voice recognition software. It may contain minor errors which are inherent in voice recognition technology. **      Final report electronically signed by Dr. Berenice Irby on 10/30/2020 11:25 AM      XR CHEST PORTABLE   Final Result   1. Poor inflation lungs. Borderline heart size. No effusion. 2. Right arm PICC line, catheter tip the cavoatrial junction. 3. Mild infiltrate scattered in both lungs and left infrahilar region. 4. Overall appearance slightly worsened from prior study. **This report has been created using voice recognition software. It may contain minor errors which are inherent in voice recognition technology. **      Final report electronically signed by Dr. Lalo Khoury on 10/23/2020 10:46 AM      CT ABDOMEN PELVIS WO CONTRAST Additional Contrast? None   Final Result   1. Almost complete resolution of previously seen airspace infiltrates in the lung bases. 2. No acute abdominal or pelvic abnormalities.             **This report has been created using voice recognition software. It may contain minor errors which are inherent in voice recognition technology. **      Final report electronically signed by Dr. Melissa Ledesma on 10/20/2020 10:50 AM      CT HEAD WO CONTRAST   Final Result    No evidence of acute intracranial abnormality. **This report has been created using voice recognition software. It may contain minor errors which are inherent in voice recognition technology. **      Final report electronically signed by Dr. Ollie Banerjee MD on 10/20/2020 10:13 AM      XR CHEST PORTABLE   Final Result   Ill-defined bilateral lung opacities. Follow-up as clinically indicated. This document has been electronically signed by: Khadra Barber MD on 10/20/2020 05:38 AM         XR CHEST PORTABLE   Final Result   Minimal residual atelectasis and/or infiltrate within the bilateral mid    and lower lungs with improvement. Improved pulmonary inflation. This document has been electronically signed by: Rosa Weaver MD on    10/16/2020 02:02 AM         XR CHEST PORTABLE   Final Result   Impression:   Progressive bilateral consolidations or ARDS. This document has been electronically signed by: Arvind Bolivar MD on    10/12/2020 04:59 AM         XR CHEST PORTABLE   Final Result    Similar bilateral ill-defined pneumonia. This document has been electronically signed by: Manuel Segura. Rufus Khan DO on    10/11/2020 09:49 AM         XR CHEST PORTABLE   Final Result   Similar diffuse patchy bilateral airspace disease and consolidations. Support devices as above. This document has been electronically signed by: Clive Yu MD on    10/10/2020 04:35 AM         XR CHEST PORTABLE   Final Result   No acute findings. This document has been electronically signed by:  Khadra Barber MD on 10/08/2020 07:40 AM         CT ABDOMEN PELVIS WO CONTRAST Additional Contrast? Oral   Final Result    IMPRESSION:   Bilateral lower lobe pneumonia. Known Covid. Continued progress imaging is advised   Distended colon with fluid, air and stool. Correlate for diarrhea. **This report has been created using voice recognition software. It may contain minor errors which are inherent in voice recognition technology. **      Final report electronically signed by Dr. Kacy Varela on 10/7/2020 6:49 PM      XR CHEST PORTABLE   Final Result   Bilateral lung consolidation not significant change. This document has been electronically signed by: Chucky Hernandez MD on 10/07/2020 07:25 AM         US RENAL COMPLETE   Final Result   Unremarkable kidneys. This document has been electronically signed by: Sean Alarcon MD on    10/07/2020 01:48 AM         XR CHEST PORTABLE   Final Result   1. There is a new endotracheal tube with the distal tip 1.8 cm above the level of the madi. 2. There is a new esophageal tube with the distal tip projecting over the gastric fundus. 3. There is a stable right PICC with the distal tip projecting over the right atrium. 4. The lung volumes are diminished. There are bilateral perihilar and the basilar opacities which are similar to the previous examination however may be accentuated by the expiratory technique. There is no pleural effusion. Follow-up chest radiographs    are recommended to confirm complete resolution. **This report has been created using voice recognition software. It may contain minor errors which are inherent in voice recognition technology. **      Final report electronically signed by Dr. Omayra Olivas on 10/6/2020 7:18 AM      XR CHEST PORTABLE   Final Result   Bilateral lung opacities. Follow-up to clearing recommended. This document has been electronically signed by: Chucky Hernandez MD on 10/06/2020 06:09 AM         XR CHEST PORTABLE   Final Result   Slightly decreased diffuse patchy bilateral airspace disease. Support devices as above. This document has been electronically signed by: Moody Gonzáles MD on    10/03/2020 05:15 AM         XR CHEST PORTABLE   Final Result   ET tube should be retracted 1.5-2.0 cm. No significant change in coarse scattered bilateral infiltrates. This document has been electronically signed by: Reji Valencia MD on    09/30/2020 06:50 AM         XR CHEST PORTABLE   Final Result      Slightly improving bilateral pneumonia. **This report has been created using voice recognition software. It may contain minor errors which are inherent in voice recognition technology. **      Final report electronically signed by Dr. Vanesa Woodard on 9/27/2020 2:23 PM      XR ABDOMEN FOR NG/OG/NE TUBE PLACEMENT   Final Result   Esophageal route tube tip in the stomach. **This report has been created using voice recognition software. It may contain minor errors which are inherent in voice recognition technology. **      Final report electronically signed by Dr Familia Hebert on 9/26/2020 10:22 AM      XR CHEST PORTABLE   Final Result   1. Lines and tubes as above. 2. Worsening bilateral pneumonia. **This report has been created using voice recognition software. It may contain minor errors which are inherent in voice recognition technology. **      Final report electronically signed by Dr. Vanesa Woodard on 9/24/2020 7:38 AM      XR CHEST PORTABLE   Final Result   1. Endotracheal tube terminates 3.1 cm above the madi. 2.  Orogastric tube in the stomach. 3.  Patchy opacities in the right upper lobe and lingula. This document has been electronically signed by: Yisel Slater MD on    09/24/2020 12:35 AM         XR CHEST PORTABLE   Final Result   1. Bilateral pulmonary opacification worse than on previous study dated 10 September 2020. This may represent worsening inflammatory process, possibly Covid 19 infection. Please correlate clinically   2. Borderline cardiomegaly.          **This report has been created using voice recognition software. It may contain minor errors which are inherent in voice recognition technology. **      Final report electronically signed by DR Frantz Garcia on 9/23/2020 10:47 AM           DVT prophylaxis: Lovenox, held for GI bleed, initiate SCDs, May consider adding in Lovenox again. Code Status: Limited    PT/OT Eval Status: Not Consulted    Active Hospital Problems    Diagnosis Date Noted    Sepsis due to COVID-19 ([facility] [address] [U07.1, A41.89]     KRISTY (acute kidney injury) ([address] [N17.9]     Hypernatremia [E87.0]     Acute encephalopathy [G93.40]     Acute on chronic anemia [D64.9]     Hypomagnesemia [E83.42]     Dysphagia [R13.10]     Diarrhea [R19.7]     At risk for malnutrition [Z91.89]     Reactive thrombocytosis [R79.89]     Pneumonia due to COVID-19 virus [U07.1, J12.89] 10/18/2020    ARF (acute renal failure) with tubular necrosis (HCC) [N17.0]     Hypokalemia [E87.6]     Other hypotension [I95.89]     Acute respiratory failure with hypoxia ([address] [J96.01]     COVID-19 virus infection [U07.1] 09/06/2020    Essential hypertension [I10]        Thank you Chantell Belle MD for the opportunity to be involved in this patient's care.     Electronically signed by Cheyenne Cosby MD on 11/4/2020 at 7:05 AM

## 2020-11-04 NOTE — CARE COORDINATION
11/4/20, 9:06 AM EST    DISCHARGE PLANNING EVALUATION      SW received a call from Susie Sánchez with [facility] admissions after hours that the [facility] can take clinically but need therapy notes.  DASIA spoke to Retie with ECF today, they will need therapy notes to start the pre-cert (CM is aware) and SW updated her that his clinical needs does not include IV antibiotics at discharge

## 2020-11-04 NOTE — PROGRESS NOTES
[facility]         Renal Inpatient Follow-Up note         11/4/2020 7:48 AM    Pt Name:   Haydee Smith  YOB: 1970  Attending:   Lion García MD    Chief Complaint : Haydee Smith is a 44 y.o. male being followed by nephrology for acute kidney injury    Interval History :   Patient seen and examined by me. No distress  Resting comfortably not much communicative  Does not appear to be having any complaints or shortness of breath.   Patient was in some retention yesterday Dimas catheter has been placed     Scheduled Medications :    tamsulosin  0.8 mg Oral Nightly    darbepoetin alejandro-polysorbate  60 mcg Subcutaneous Weekly - Thursday    pantoprazole  40 mg Intravenous BID    vancomycin (VANCOCIN) intermittent dosing (placeholder)   Other RX Placeholder    sodium chloride flush  10 mL Intravenous 2 times per day    ferrous sulfate  325 mg Oral BID WC    insulin lispro  0-12 Units Subcutaneous Q6H    [Held by provider] gabapentin  400 mg Oral BID    modafinil  100 mg Oral Daily    [Held by provider] enoxaparin  40 mg Subcutaneous BID    insulin glargine  38 Units Subcutaneous Nightly    lidocaine 1 % injection  5 mL Intradermal Once    magnesium replacement protocol   Other RX Placeholder    phosphorus replacement protocol   Other RX Placeholder    calcium replacement protocol   Other RX Placeholder    [Held by provider] ARIPiprazole  15 mg Oral Daily    [Held by provider] aspirin  81 mg Oral Daily    glycopyrrolate-formoterol  2 puff Inhalation BID      sodium chloride 75 mL/hr at 11/03/20 1821    sodium chloride      dextrose         Vitals :  /84   Pulse 107   Temp 97.9 °F (36.6 °C) (Oral)   Resp 16   Ht 5' 8\" (1.727 m)   Wt 289 lb 12.8 oz (131.5 kg)   SpO2 95%   BMI 44.06 kg/m²     24HR INTAKE/OUTPUT:      Intake/Output Summary (Last 24 hours) at 11/4/2020 7053  Last data filed at 11/4/2020 6006  Gross per 24 hour   Intake 1587.35 ml   Output 725 ml   Net 862.35 ml     Last 3 weights  Wt Readings from Last 3 Encounters:   11/04/20 289 lb 12.8 oz (131.5 kg)   09/15/20 281 lb 6.4 oz (127.6 kg)   08/21/20 (!) 306 lb 6.4 oz (139 kg)           Physical Exam :  General Appearance:  Well developed. No distress  Mouth/Throat:  Oral mucosa moist  Neck:  Supple, no JVD  Lungs:  Breath sounds: clear  Heart[de-identified]  S1,S2 heard  Abdomen:  Soft, non - tender  Musculoskeletal:  Edema -none         Last 3 CBC   Recent Labs     11/02/20  0852 11/03/20  0602 11/04/20  0540   WBC 9.1 8.4 8.4   RBC 3.14* 3.22* 3.18*   HGB 9.4* 9.5* 9.5*   HCT 30.7* 31.2* 30.8*   * 383 378     Last 3 CMP  Recent Labs     11/02/20  0852 11/03/20  0602 11/04/20  0540    142 145   K 4.0 3.9 3.7    103 105   CO2 26 24 26   BUN 14 11 9   CREATININE 1.8* 2.0* 1.7*   CALCIUM 10.0 9.8 10.2             ASSESSMENT / Plan   Acute kidney injury-exact etiology not clear however patient was on vancomycin vancomycin level within normal limits. mason catheter has been placed. continue IV fluids . Hx of pneumonia currently resolved . Urinary retention - on a mason catheter . Essential hypertension running reasonable  Anemia on BRAD  Meds reviewed    EMELY Faulkner D.  [facility].

## 2020-11-04 NOTE — PROGRESS NOTES
[facility] ICU STEPDOWN TELEMETRY 4K - 4K-10/010-A    Time In: 1025  Time Out: 1050  Timed Code Treatment Minutes: 25 Minutes  Minutes: 25          Date: 2020  Patient Name: James Velazquez,  Gender:  male        MRN: [id number]  : 1970  (44 y.o.)     Referring Practitioner: Katie Holley. [facility], MD  Diagnosis: acute respiratory failure with hypoxemia  Additional Pertinent Hx: Pt is a 45 yo male with a history of morbid obesity, DM type II, ETOH abuse, hyperlipidemia, KIARA, and non-alcoholic steatohepatitis. He has been in the hospital since 2020 with admitting diagnosis of COVID-19. With this diagnosis he also had bacterial infiltrates. Pt has been intubated and extubated twice during this admission, and his current diagnosis is acute respiratory failure with hypoxemia. He is now COVID negative. Prior Level of Function:  Lives With: Alone  Type of Home: Apartment        Additional Comments: PLOF is unknown. Pt did state he lives alone in an apartment, but due to his difficulty communicating, unable to obtain more information. Restrictions/Precautions:  Restrictions/Precautions: General Precautions, Fall Risk Deflate bed prior to up to side so feet touch floor     SUBJECTIVE: Pt in bariatric bed, agreeable to PT and up to side of bed and with min encouragement agreed to try transfers with 2 A and to remain up in chair. PAIN: couldn't rate /10: feet    OBJECTIVE:  Bed Mobility:  Rolling to Right: Contact Guard Assistance, Minimal Assistance, X 1, with increased time for completion, with bedrail   Supine to Sit: Moderate Assistance, X 2, with increased time for completion, with LEs and trunk  Scooting:  Moderate Assistance    Transfers:  Sit to Stand: Minimal Assistance, Moderate Assistance, X 2, with increased time for completion, cues for hand placement, up to megan stedy with pt pulling with hands on bar in front of him, From seat of megan szymanski to Education & Training, Positioning, Safety Education & Training, Cognitive/Perceptual Training, Neuromuscular Re-education    Patient Education  Patient Education: Plan of Care, Home Exercise Program, Bed Mobility, Transfers    Goals:  Patient goals : discharge from hospital  Short term goals  Time Frame for Short term goals: by hospital discharge  Short term goal 1: Roll to L and to R with CGAx 1 for ease of bed mobility  Short term goal 2: Supine<>sit with Min A x 1 to improve ability to get in/out of bed. Short term goal 3: Pt to tolerate sitting EOB with SBA x 5 min in prep for OOB activity. -MET 11-4  Short term goal 4: Pt will perform sit to stand with Min of 1 with or without megan stedy to get on and off various surfaces  Short term goal 5: Assess stand pivot or amb from bed to chair as able  Long term goals  Time Frame for Long term goals : N/A due to short ELOS    Following session, patient left in safe position with all fall risk precautions in place.

## 2020-11-04 NOTE — PROGRESS NOTES
Progress note: Infectious diseases    Patient - Brooke Peer,  Age - 44 y.o.    - 1970      Room Number - 4K-10/010-A   MRN -  [id number]   Acct # - [de-identified]  Date of Admission -  2020  9:02 AM    SUBJECTIVE:   No new issues. OBJECTIVE   VITALS    height is 5' 8\" (1.727 m) and weight is 289 lb 12.8 oz (131.5 kg). His oral temperature is 97.9 °F (36.6 °C). His blood pressure is 141/76 (abnormal) and his pulse is 104. His respiration is 18 and oxygen saturation is 95%. Wt Readings from Last 3 Encounters:   20 289 lb 12.8 oz (131.5 kg)   09/15/20 281 lb 6.4 oz (127.6 kg)   20 (!) 306 lb 6.4 oz (139 kg)       I/O (24 Hours)    Intake/Output Summary (Last 24 hours) at 2020 1311  Last data filed at 2020 0454  Gross per 24 hour   Intake 1547.35 ml   Output 725 ml   Net 822.35 ml       General Appearance awake and oriented,  Very weak. HEENT - normocephalic, atraumatic, pale  conjunctiva,  anicteric sclera    Neck - Supple, no mass  Lungs -  Bilateral   air entry, diminished breath sound  Cardiovascular - Heart sounds are normal.    Abdomen - soft, not distended, nontender,   Neurologic awake, answers questions  Skin - No bruising or bleeding  Extremities - chronic leg edema, perianal open wound.        MEDICATIONS:      gabapentin  400 mg Oral Once    tamsulosin  0.8 mg Oral Nightly    darbepoetin alejandro-polysorbate  60 mcg Subcutaneous Weekly - Thursday    pantoprazole  40 mg Intravenous BID    vancomycin (VANCOCIN) intermittent dosing (placeholder)   Other RX Placeholder    sodium chloride flush  10 mL Intravenous 2 times per day    ferrous sulfate  325 mg Oral BID     insulin lispro  0-12 Units Subcutaneous Q6H    gabapentin  400 mg Oral BID    modafinil  100 mg Oral Daily    [Held by provider] enoxaparin  40 mg Subcutaneous BID    [Held by provider] insulin glargine 38 Units Subcutaneous Nightly    lidocaine 1 % injection  5 mL Intradermal Once    magnesium replacement protocol   Other RX Placeholder    phosphorus replacement protocol   Other RX Placeholder    calcium replacement protocol   Other RX Placeholder    [Held by provider] ARIPiprazole  15 mg Oral Daily    [Held by provider] aspirin  81 mg Oral Daily    glycopyrrolate-formoterol  2 puff Inhalation BID      dextrose      sodium chloride 75 mL/hr at 11/03/20 1821     glucose, dextrose, glucagon (rDNA), dextrose, sodium chloride flush, hydrALAZINE, acetaminophen, potassium chloride **OR** potassium alternative oral replacement **OR** potassium chloride, potassium chloride, lidocaine, acetaminophen **OR** [DISCONTINUED] acetaminophen, polyethylene glycol, promethazine **OR** ondansetron, albuterol      LABS:     CBC:   Recent Labs     11/02/20  0852 11/03/20  0602 11/04/20  0540   WBC 9.1 8.4 8.4   HGB 9.4* 9.5* 9.5*   * 383 378     BMP:    Recent Labs     11/02/20  0852 11/03/20  0602 11/04/20  0540    142 145   K 4.0 3.9 3.7    103 105   CO2 26 24 26   BUN 14 11 9   CREATININE 1.8* 2.0* 1.7*   GLUCOSE 118* 119* 127*     Calcium:  Recent Labs     11/04/20  0540   CALCIUM 10.2     Ionized Calcium:No results for input(s): IONCA in the last 72 hours. Magnesium:  Recent Labs     11/02/20  0852   MG 1.7     Phosphorus:  No results for input(s): PHOS in the last 72 hours. BNP:No results for input(s): BNP in the last 72 hours. Glucose:  Recent Labs     11/03/20  2351 11/04/20  0544 11/04/20  1116   POCGLU 114* 113* 114*         Problem list of patient:     Patient Active Problem List   Diagnosis Code    Chronic diastolic congestive heart failure (HCC) I50.32    Atrial fibrillation (HCC) I48.91    BPH (benign prostatic hyperplasia) N40.0    Carpal tunnel syndrome on right G56.01    Chronic gout M1A. 9XX0    DM2 (diabetes mellitus, type 2) ([facility] [address] E11.9    Heart failure with preserved ejection fraction (HCC) I50.30    History of alcohol abuse F10.11    History of osteomyelitis Z87.39    Essential hypertension I10    Hypogonadism, male E29.1    Major depression F32.9    Morbid obesity (HCC) E66.01    DURAN (nonalcoholic steatohepatitis) K75.81    KIARA (obstructive sleep apnea) G47.33    Vitamin D deficiency E55.9    Normocytic anemia D64.9    Physical deconditioning R53.81    Mood disorder (HCC) F39    Hallucinations R44.3    GERD (gastroesophageal reflux disease) K21.9    Wound of buttock S31.809A    Chest wall pain with tenderness R07.89    Primary osteoarthritis of left hip M16.12    COVID-19 U07.1    COVID-19 virus infection U07.1    Acute respiratory failure with hypoxia ([facility]) J96.01    ARF (acute renal failure) with tubular necrosis ([facility]) N17.0    Hypokalemia E87.6    Other hypotension I95.89    Pneumonia due to COVID-19 virus U07.1, J12.89    Sepsis due to COVID-19 ([facility]) U07.1, A41.89    KRISTY (acute kidney injury) ([facility] [address] N17.9    Hypernatremia E87.0    Acute encephalopathy G93.40    Acute on chronic anemia D64.9    Hypomagnesemia E83.42    Dysphagia R13.10    Diarrhea R19.7    At risk for malnutrition Z91.89    Reactive thrombocytosis R79.89         ASSESSMENT/PLAN   Respiratory failure following COVID -19 infection:     Anemia:     Deconditioning  Ulceration of the rectum with intermittent bleed improving  Morbid Obesity  CHF  Stop vancomycin at am  Matilde Alejandro MD, FACP 11/4/2020 1:11 PM

## 2020-11-05 LAB
ANION GAP SERPL CALCULATED.3IONS-SCNC: 15 MEQ/L (ref 8–16)
BASOPHILS # BLD: 0.9 %
BASOPHILS ABSOLUTE: 0.1 THOU/MM3 (ref 0–0.1)
BUN BLDV-MCNC: 7 MG/DL (ref 7–22)
CALCIUM SERPL-MCNC: 10.2 MG/DL (ref 8.5–10.5)
CHLORIDE BLD-SCNC: 105 MEQ/L (ref 98–111)
CO2: 24 MEQ/L (ref 23–33)
CREAT SERPL-MCNC: 1.7 MG/DL (ref 0.4–1.2)
EOSINOPHIL # BLD: 7.8 %
EOSINOPHILS ABSOLUTE: 0.6 THOU/MM3 (ref 0–0.4)
ERYTHROCYTE [DISTWIDTH] IN BLOOD BY AUTOMATED COUNT: 16.7 % (ref 11.5–14.5)
ERYTHROCYTE [DISTWIDTH] IN BLOOD BY AUTOMATED COUNT: 60.7 FL (ref 35–45)
GFR SERPL CREATININE-BSD FRML MDRD: 51 ML/MIN/1.73M2
GLUCOSE BLD-MCNC: 101 MG/DL (ref 70–108)
GLUCOSE BLD-MCNC: 102 MG/DL (ref 70–108)
GLUCOSE BLD-MCNC: 103 MG/DL (ref 70–108)
GLUCOSE BLD-MCNC: 105 MG/DL (ref 70–108)
GLUCOSE BLD-MCNC: 96 MG/DL (ref 70–108)
HCT VFR BLD CALC: 32.8 % (ref 42–52)
HEMOGLOBIN: 9.7 GM/DL (ref 14–18)
IMMATURE GRANS (ABS): 0.04 THOU/MM3 (ref 0–0.07)
IMMATURE GRANULOCYTES: 0.5 %
LYMPHOCYTES # BLD: 17.7 %
LYMPHOCYTES ABSOLUTE: 1.4 THOU/MM3 (ref 1–4.8)
MCH RBC QN AUTO: 29.3 PG (ref 26–33)
MCHC RBC AUTO-ENTMCNC: 29.6 GM/DL (ref 32.2–35.5)
MCV RBC AUTO: 99.1 FL (ref 80–94)
MONOCYTES # BLD: 11.1 %
MONOCYTES ABSOLUTE: 0.9 THOU/MM3 (ref 0.4–1.3)
NUCLEATED RED BLOOD CELLS: 0 /100 WBC
PLATELET # BLD: 369 THOU/MM3 (ref 130–400)
PMV BLD AUTO: 10.8 FL (ref 9.4–12.4)
POTASSIUM SERPL-SCNC: 3.5 MEQ/L (ref 3.5–5.2)
RBC # BLD: 3.31 MILL/MM3 (ref 4.7–6.1)
SARS-COV-2, PCR: NOT DETECTED
SEG NEUTROPHILS: 62 %
SEGMENTED NEUTROPHILS ABSOLUTE COUNT: 5 THOU/MM3 (ref 1.8–7.7)
SODIUM BLD-SCNC: 144 MEQ/L (ref 135–145)
URINE CULTURE REFLEX: NORMAL
WBC # BLD: 8.1 THOU/MM3 (ref 4.8–10.8)

## 2020-11-05 PROCEDURE — 6360000002 HC RX W HCPCS: Performed by: INTERNAL MEDICINE

## 2020-11-05 PROCEDURE — 97530 THERAPEUTIC ACTIVITIES: CPT

## 2020-11-05 PROCEDURE — 6370000000 HC RX 637 (ALT 250 FOR IP): Performed by: INTERNAL MEDICINE

## 2020-11-05 PROCEDURE — 36415 COLL VENOUS BLD VENIPUNCTURE: CPT

## 2020-11-05 PROCEDURE — 6360000002 HC RX W HCPCS: Performed by: FAMILY MEDICINE

## 2020-11-05 PROCEDURE — 99232 SBSQ HOSP IP/OBS MODERATE 35: CPT | Performed by: INTERNAL MEDICINE

## 2020-11-05 PROCEDURE — C9113 INJ PANTOPRAZOLE SODIUM, VIA: HCPCS | Performed by: FAMILY MEDICINE

## 2020-11-05 PROCEDURE — 80048 BASIC METABOLIC PNL TOTAL CA: CPT

## 2020-11-05 PROCEDURE — 2580000003 HC RX 258: Performed by: INTERNAL MEDICINE

## 2020-11-05 PROCEDURE — 92526 ORAL FUNCTION THERAPY: CPT

## 2020-11-05 PROCEDURE — 2700000000 HC OXYGEN THERAPY PER DAY

## 2020-11-05 PROCEDURE — 6370000000 HC RX 637 (ALT 250 FOR IP): Performed by: STUDENT IN AN ORGANIZED HEALTH CARE EDUCATION/TRAINING PROGRAM

## 2020-11-05 PROCEDURE — 82948 REAGENT STRIP/BLOOD GLUCOSE: CPT

## 2020-11-05 PROCEDURE — 85025 COMPLETE CBC W/AUTO DIFF WBC: CPT

## 2020-11-05 PROCEDURE — 94660 CPAP INITIATION&MGMT: CPT

## 2020-11-05 PROCEDURE — U0002 COVID-19 LAB TEST NON-CDC: HCPCS

## 2020-11-05 PROCEDURE — 2580000003 HC RX 258: Performed by: FAMILY MEDICINE

## 2020-11-05 PROCEDURE — 94761 N-INVAS EAR/PLS OXIMETRY MLT: CPT

## 2020-11-05 PROCEDURE — 6370000000 HC RX 637 (ALT 250 FOR IP): Performed by: NURSE PRACTITIONER

## 2020-11-05 PROCEDURE — 2060000000 HC ICU INTERMEDIATE R&B

## 2020-11-05 PROCEDURE — 94640 AIRWAY INHALATION TREATMENT: CPT

## 2020-11-05 RX ADMIN — VANCOMYCIN HYDROCHLORIDE 1500 MG: 5 INJECTION, POWDER, LYOPHILIZED, FOR SOLUTION INTRAVENOUS at 16:14

## 2020-11-05 RX ADMIN — GLYCOPYRROLATE AND FORMOTEROL FUMARATE 2 PUFF: 9; 4.8 AEROSOL, METERED RESPIRATORY (INHALATION) at 09:51

## 2020-11-05 RX ADMIN — FERROUS SULFATE TAB 325 MG (65 MG ELEMENTAL FE) 325 MG: 325 (65 FE) TAB at 08:55

## 2020-11-05 RX ADMIN — POTASSIUM BICARBONATE 40 MEQ: 782 TABLET, EFFERVESCENT ORAL at 09:44

## 2020-11-05 RX ADMIN — SODIUM CHLORIDE, PRESERVATIVE FREE 10 ML: 5 INJECTION INTRAVENOUS at 20:36

## 2020-11-05 RX ADMIN — MODAFINIL 100 MG: 100 TABLET ORAL at 05:41

## 2020-11-05 RX ADMIN — SODIUM CHLORIDE: 9 INJECTION, SOLUTION INTRAVENOUS at 13:05

## 2020-11-05 RX ADMIN — POTASSIUM BICARBONATE 40 MEQ: 782 TABLET, EFFERVESCENT ORAL at 20:35

## 2020-11-05 RX ADMIN — GABAPENTIN 400 MG: 400 CAPSULE ORAL at 20:35

## 2020-11-05 RX ADMIN — GLYCOPYRROLATE AND FORMOTEROL FUMARATE 2 PUFF: 9; 4.8 AEROSOL, METERED RESPIRATORY (INHALATION) at 20:28

## 2020-11-05 RX ADMIN — PANTOPRAZOLE SODIUM 40 MG: 40 INJECTION, POWDER, FOR SOLUTION INTRAVENOUS at 20:35

## 2020-11-05 RX ADMIN — GABAPENTIN 400 MG: 400 CAPSULE ORAL at 08:55

## 2020-11-05 RX ADMIN — DARBEPOETIN ALFA 60 MCG: 60 INJECTION, SOLUTION INTRAVENOUS; SUBCUTANEOUS at 10:59

## 2020-11-05 RX ADMIN — PANTOPRAZOLE SODIUM 40 MG: 40 INJECTION, POWDER, FOR SOLUTION INTRAVENOUS at 08:56

## 2020-11-05 RX ADMIN — SODIUM CHLORIDE, PRESERVATIVE FREE 10 ML: 5 INJECTION INTRAVENOUS at 08:56

## 2020-11-05 RX ADMIN — TAMSULOSIN HYDROCHLORIDE 0.8 MG: 0.4 CAPSULE ORAL at 20:37

## 2020-11-05 RX ADMIN — FERROUS SULFATE TAB 325 MG (65 MG ELEMENTAL FE) 325 MG: 325 (65 FE) TAB at 17:17

## 2020-11-05 NOTE — PROGRESS NOTES
1405: Patient took one bite of his lunch and stating he didn't want anymore. 1430: Patient placed on BiPAP for a nap.

## 2020-11-05 NOTE — CARE COORDINATION
11/5/20, 11:16 AM EST  Patient's pre-cert is approved. He will transport to [facility] by squad. He is Medicare payment at the [facility]. SW spoke to his father by phone to update on the plan. DASIA provided the nurse with the phone number for report--[phone] and fax number--[phone]  Patient goals/plan/ treatment preferences discussed by  and . Patient goals/plan/ treatment preferences reviewed with patient/ family. Patient/ family verbalize understanding of discharge plan and are in agreement with goal/plan/treatment preferences. Understanding was demonstrated using the teach back method. AVS provided by RN at time of discharge, which includes all necessary medical information pertaining to the patients current course of illness, treatment, post-discharge goals of care, and treatment preferences. Services After Discharge  Services At/After Discharge:  In ambulance, Nursing Services, [facility], Skilled Therapy([facility])   IMM Letter  IMM Letter given to Patient/Family/Significant other/Guardian/POA/by[de-identified]   IMM Letter date given[de-identified] 11/05/20  IMM Letter time given[de-identified] 8692

## 2020-11-05 NOTE — PROGRESS NOTES
Progress note: Infectious diseases    Patient - Rush Narayan,  Age - 44 y.o.    - 1970      Room Number - 4K-10/010-A   MRN -  [id number]   Acct # - [de-identified]  Date of Admission -  2020  9:02 AM    SUBJECTIVE:   No new issues. plan for ECF. OBJECTIVE   VITALS    height is 5' 8\" (1.727 m) and weight is 275 lb 4.8 oz (124.9 kg). His oral temperature is 98.6 °F (37 °C). His blood pressure is 131/73 and his pulse is 92. His respiration is 18 and oxygen saturation is 95%. Wt Readings from Last 3 Encounters:   20 275 lb 4.8 oz (124.9 kg)   09/15/20 281 lb 6.4 oz (127.6 kg)   20 (!) 306 lb 6.4 oz (139 kg)       I/O (24 Hours)    Intake/Output Summary (Last 24 hours) at 2020 1424  Last data filed at 2020 1303  Gross per 24 hour   Intake 1696.6 ml   Output 750 ml   Net 946.6 ml       General Appearance awake and oriented,  Very weak. HEENT - normocephalic, atraumatic, pale  conjunctiva,  anicteric sclera    Neck - Supple, no mass  Lungs -  Bilateral   air entry, diminished breath sound  Cardiovascular - Heart sounds are normal.    Abdomen - soft, not distended, nontender,   Neurologic awake, and oriented. Skin - No bruising or bleeding  Extremities - chronic leg edema, perianal open wound.        MEDICATIONS:      vancomycin  1,500 mg Intravenous Once    potassium bicarb-citric acid  40 mEq Oral BID    tamsulosin  0.8 mg Oral Nightly    darbepoetin alejandro-polysorbate  60 mcg Subcutaneous Weekly - Thursday    pantoprazole  40 mg Intravenous BID    vancomycin (VANCOCIN) intermittent dosing (placeholder)   Other RX Placeholder    sodium chloride flush  10 mL Intravenous 2 times per day    ferrous sulfate  325 mg Oral BID WC    insulin lispro  0-12 Units Subcutaneous Q6H    gabapentin  400 mg Oral BID    modafinil  100 mg Oral Daily    [Held by provider] enoxaparin  40 mg Subcutaneous BID    [Held by provider] insulin glargine  38 Units Subcutaneous Nightly    lidocaine 1 % injection  5 mL Intradermal Once    magnesium replacement protocol   Other RX Placeholder    phosphorus replacement protocol   Other RX Placeholder    calcium replacement protocol   Other RX Placeholder    [Held by provider] ARIPiprazole  15 mg Oral Daily    [Held by provider] aspirin  81 mg Oral Daily    glycopyrrolate-formoterol  2 puff Inhalation BID      dextrose      sodium chloride 75 mL/hr at 11/05/20 1305     glucose, dextrose, glucagon (rDNA), dextrose, sodium chloride flush, hydrALAZINE, acetaminophen, potassium chloride **OR** potassium alternative oral replacement **OR** potassium chloride, potassium chloride, lidocaine, acetaminophen **OR** [DISCONTINUED] acetaminophen, polyethylene glycol, promethazine **OR** ondansetron, albuterol      LABS:     CBC:   Recent Labs     11/03/20  0602 11/04/20  0540 11/05/20  0544   WBC 8.4 8.4 8.1   HGB 9.5* 9.5* 9.7*    378 369     BMP:    Recent Labs     11/03/20  0602 11/04/20  0540 11/05/20  0544    145 144   K 3.9 3.7 3.5    105 105   CO2 24 26 24   BUN 11 9 7   CREATININE 2.0* 1.7* 1.7*   GLUCOSE 119* 127* 102     Calcium:  Recent Labs     11/05/20  0544   CALCIUM 10.2    Glucose:  Recent Labs     11/05/20  0012 11/05/20  0540 11/05/20  1115   POCGLU 103 96 101         Problem list of patient:     Patient Active Problem List   Diagnosis Code    Chronic diastolic congestive heart failure (HCC) I50.32    Atrial fibrillation (HCC) I48.91    BPH (benign prostatic hyperplasia) N40.0    Carpal tunnel syndrome on right G56.01    Chronic gout M1A. 9XX0    DM2 (diabetes mellitus, type 2) ([facility] [address] E11.9    Heart failure with preserved ejection fraction (HCC) I50.30    History of alcohol abuse F10.11    History of osteomyelitis Z87.39    Essential hypertension I10    Hypogonadism, male E29.1    Major depression F32.9    Morbid obesity ([facility] [address] E66.01    DURAN (nonalcoholic steatohepatitis) K75.81    KIARA (obstructive sleep apnea) G47.33    Vitamin D deficiency E55.9    Normocytic anemia D64.9    Physical deconditioning R53.81    Mood disorder (HCC) F39    Hallucinations R44.3    GERD (gastroesophageal reflux disease) K21.9    Wound of buttock S31.809A    Chest wall pain with tenderness R07.89    Primary osteoarthritis of left hip M16.12    COVID-19 U07.1    COVID-19 virus infection U07.1    Acute respiratory failure with hypoxia ([facility]) J96.01    ARF (acute renal failure) with tubular necrosis ([facility]) N17.0    Hypokalemia E87.6    Other hypotension I95.89    Pneumonia due to COVID-19 virus U07.1, J12.89    Sepsis due to COVID-19 (HCC) U07.1, A41.89    KRISTY (acute kidney injury) ([facility] [address] N17.9    Hypernatremia E87.0    Acute encephalopathy G93.40    Acute on chronic anemia D64.9    Hypomagnesemia E83.42    Dysphagia R13.10    Diarrhea R19.7    At risk for malnutrition Z91.89    Reactive thrombocytosis R79.89    Hypercalcemia E83.52         ASSESSMENT/PLAN   Respiratory failure following COVID -19 infection:   improving  Anemia:     Deconditioning  Ulceration of the rectum better  Morbid Obesity  CHF  Ok to discharge to [facility]  Will see him as needed.   Michael Steen MD, Roula Felder 11/5/2020 2:24 PM

## 2020-11-05 NOTE — PROGRESS NOTES
Pt resting with BiPAP on. Maintaining effective oxygenation. Report off to primary Shankar ESQUIVEL.

## 2020-11-05 NOTE — PROGRESS NOTES
[address] [address]  INPATIENT PHYSICAL THERAPY  DAILY NOTE  [facility] ICU STEPDOWN TELEMETRY 4K - 4K-10010-A    Time In: 0900  Time Out: 0934  Timed Code Treatment Minutes: 29 Minutes  Minutes: 34          Date: 2020  Patient Name: Timo Gracia,  Gender:  male        MRN: [id number]  : 1970  (44 y.o.)     Referring Practitioner: Maritza Barajas MD  Diagnosis: acute respiratory failure with hypoxemia  Additional Pertinent Hx: Pt is a 43 yo male with a history of morbid obesity, DM type II, ETOH abuse, hyperlipidemia, KIARA, and non-alcoholic steatohepatitis. He has been in the hospital since 2020 with admitting diagnosis of COVID-19. With this diagnosis he also had bacterial infiltrates. Pt has been intubated and extubated twice during this admission, and his current diagnosis is acute respiratory failure with hypoxemia. He is now COVID negative. Prior Level of Function:  Lives With: Alone  Type of Home: Apartment        Additional Comments: PLOF is unknown. Pt did state he lives alone in an apartment, but due to his difficulty communicating, unable to obtain more information. Restrictions/Precautions:  Restrictions/Precautions: General Precautions, Fall Risk Dimas catheter    SUBJECTIVE: RN approved session. Patient in bed upon arrival, agreed and cooperative for therapy. Required encouragement throughout session due to increased numbness in left LE. Required extra time to complete but did agree to get up to chair. PAIN: Numbness/discomfort in LLE, not rated.     OBJECTIVE:  Bed Mobility:  Rolling to Right: Contact Guard Assistance, use of bed rail   Supine to Sit: Minimal Assistance, with verbal cues , with increased time for completion, use of bed rail  Scooting: [employer], to scoot out on EOB    Transfers:  Sit to Stand: Minimal Assistance, Moderate Assistance, X 2, with verbal cues, using megan-stedy; attempted x1 trial with RW but pt unable to get standing  Stand to Sit:Minimal Assistance, X 2, to recliner   Bed to chair: Dependent x1 using megan-stedy    Balance:  Static Sitting Balance:  Stand By Assistance  Dynamic Sitting Balance: Stand By Assistance, Contact Guard Assistance, sitting EOB, unsupported  Static Standing Balance: Minimal Assistance, X 1 and CGA of another for safety, with verbal cues , for erect posture, able to stand x2 trials but fatigued quickly and needed to sit    Exercise:  Patient was guided in 2 set(s) 3-5 reps of exercise to left lower extremities. passive heel cord/ HS stretch x10 seconds each. Pt reports discomfort with stretch but did report some relief in numbness. Unable to complete more d/t pt talking on phone. .  Exercises were completed for increased independence with functional mobility. Functional Outcome Measures: Completed  -PAC Inpatient Mobility Raw Score : 10  AM-PAC Inpatient T-Scale Score : 32.29    ASSESSMENT:  Assessment: Patient progressing toward established goals. Tolerated session well, limited due to generalized weakness and increased numbness in LLE. Requires +2 for safety with transfers at this time. Activity Tolerance:  Patient tolerance of  treatment: good. Equipment Recommendations:Equipment Needed: (unknown at this time; will likely need some type of DME)  [facility]    Plan: Times per week: 5 X GM  Times per day: Daily  Specific instructions for Next Treatment: ther ex, sitting edge of bed, transfers megan stedy vs 2 A . Used megan stedy min/mod of 2 wed, pregait  Current Treatment Recommendations: Strengthening, Functional Mobility Training, ROM, Transfer Training, Balance Training, ADL/Self-care Training, Endurance Training, Patient/Caregiver Education & Training, Positioning, Safety Education & Training, Cognitive/Perceptual Training, Neuromuscular Re-education    Patient Education  Patient Education: Plan of Care, Precautions/Restrictions, Bed Mobility, Transfers, Reviewed Prior Education, importance of mobility and stretching, - Patient Requires Continued Education    Goals:  Patient goals : discharge from hospital  Short term goals  Time Frame for Short term goals: by hospital discharge  Short term goal 1: Roll to L and to R with CGAx 1 for ease of bed mobility  Short term goal 2: Supine<>sit with Min A x 1 to improve ability to get in/out of bed. Short term goal 3: Pt to tolerate sitting EOB with SBA x 5 min in prep for OOB activity. -MET 11-4  Short term goal 4: Pt will perform sit to stand with Min of 1 with or without megan stedy to get on and off various surfaces  Short term goal 5: Assess stand pivot or amb from bed to chair as able  Long term goals  Time Frame for Long term goals : N/A due to short ELOS    Following session, patient left in safe position with all fall risk precautions in place.

## 2020-11-05 NOTE — CARE COORDINATION
11/5/20, 3:34 PM EST    DISCHARGE PLANNING EVALUATION      SW accidentally completed the PSAR screen for admission incorrectly and has now held up the discharge. This has been corrected but likely will not turn around yet today. ECF aware. Pre-cert still good tomorrow.

## 2020-11-05 NOTE — PROGRESS NOTES
Hospitalist Progress Note      Patient:  Rush Narayan    Unit/Bed:4K-10/010-A  YOB: 1970  MRN: [id number]   Acct: [de-identified]     PCP: Juan Luis Staples MD  Date of Admission: 9/23/2020    Date of Service: Pt seen/examined on 11/05/20  and Admitted to Inpatient with expected LOS greater than two midnights due to medical therapy. Chief Complaint:  Shortness of Breath    Subjective - Last 24 hours:    Patient is stable. No new complaints. Placement arranged. Discharge planned for tomorrow. Assessment and Plan:    1.) Sepsis due to Covid Pneumonia and Bacteremia: Initially resolved, now having intermittent fever again and today leukocytosis is resolved. Complicated medical course. Recent discharge from [address] [place] [address] for COVID-19 on 9/15/2020. Readmitted for COVID-19 pneumonia on 9/23/2020, intubation on 9/23/2020 and 10/6/2020. Successfully weaned to room air currently. Per ICU notes patient would be a good candidate for Zyvox, however due to patient's inability to swallow he has been unable to receive Zyvox. Has been treated with Doxycycline and Vancomycin (10/6-10/14), and Zosyn (10/6-10/16) for likely MRSA pneumonia. Patient began having low grade fevers on 10/21, with a temperature of 103.0F on 10/23. Was noted to be septic again with Fever, Tachycardia, and Tachypnea. ID was consulted and started Zosyn for a 7 day course and Vancomycin. Repeat Blood Cultures negative. UA negative. Possible sources were recurrent pneumonia, rectal wound, or PICC line. PICC line could not be removed due to inability to have an NG tube for nutrition. One Blood Culture returned (+) for Gram Positive Cocci in clusters, however there was no other culture to compare it against.  Respiratory Panel was negative. LA normal, ProCal 4.58 on 10/23/20. Dr. Ben Kendall following, appreciate his assistance and will follow his recommendations.   Antibiotics planned to continue for one week starting on 10/28.  -Leukocytosis resolved, afebrile, on room air, good sats  -Chest x-ray 11/01/20: Persistent mild bilateral patchy pulmonary opacities. -Repeat CBC qd  -Antibiotics and procalcitonin per ID/ following, appreciate his assistance. 2.) Acute hypoxic respiratory failure due to COVID-19 pneumonia:  Intubated on 10/6/2020, extubated on 10/15/2020. Was on room air until 10/24, when patient required 2L NC, was placed in ICU on 10/25 for Hypotension, and was started on BiPAP at that time. Began to wean on 10/28. Patient sating at 95% without the mask on 10/30. KIARA on BiPAP -patient needs to be on BiPAP whenever sleeping, including naps, and often while awake. Patient was offered tracheostomy but family refused to consider this option previously. 3.) KRISTY likely obstructive vs prerenal vs AIN:  Creatinine 1.8 - judicious fluid hydration. Trended up to 4.1 on 10/7/2020. Baseline creatinine 0.6 to 0.8. Nephrology consulted. Started IVF hydration, Cr trended down to 1.5 on 10/24, and has remained stable around 1.3-1.5. Nephrology suggested this is a new baseline. Monitor BMP qd  -Patient's creatinine went up to 1.8 on 11/2/2020  -1000 mL fluid bolus was ordered during the day  -Dimas needed to be reinserted due to urinary retention, renal function improving  -nephrology consulted appreciate their assistance. -Nephrology ordered urinary indices  -Tamsulosin restarted    4.) Hypernatremia, likely due to dehydration (Resolved): Off D5W, patient had episode of hypernatremia of 146 on 10/23. Started back on 0.45% NS as per Nephrology. TPN stopped, diet advanced. Monitoring BMP qd  - off TPN, patient is eating    5.) Acute encephalopathy, etiology unclear, likely related to recent Covid 19 infection: 10/23 saw Worsening mentation today with fevers, likely septic due to unknown etiology. Infectious causes include rectal wound, pneumonia, PICC line infection. CT head on 10/20/2020 unremarkable. Patient is still lethargic although mentation at baseline. 6.) Acute on chronic Normocytic anemia due to acute rectal bleeding- secondary to gluteal/rectal ulcerations vs hemodilutional from IVF, (Improving):   -Hemoglobin stable. Baseline Hgb 7-8 in Oct 2020 and around 11 in Sep 2020  -No recent BRBPR, melena. Reportedly patient had clots per rectal tube on October 12 and 3 BM's on Oct 25th w/clots. Patient does have rectal bleed from rectal ulcer from Flexi-Seal. GI stated that supportive measures only would be preformed for the perianal ulcers. States increased PO feeds may worsen the ulcers. Aranesp started on 10/30 by GI for Anemia of Chronic Diseases. -Patient received a total of 6 units so far  -Continue to monitor daily CBC, transfuse per unit protocol    7.) Hypokalemia due to hypomagnesemia and diarrhea, (Resolved):   -Replace per protocol, BMP in am .  -Will restart Effer- K 40 meq BID, if potassium drops while off TPN     8.) Hypomagnesemia likely due to poor oral intake and diarrhea, (Resolved):   -Replace per protocol, check magnesium level in am.     9.) Dysphagia, At risk for Malnutrition: Patient failed Fiberoptic Endoscopic Evaluation of the Swallow (FEES). ST recommend NPO, per ST NGT not an option right now due to failed FEES. Started TPN temporarily on 10/21. Followed dietary recommendation for Free Water addition. GI stated patient not a good candidate for PEG unless off of BiPAP, and father does not want a tracheostomy performed at this time. On 10/30, patient was without his BiPAP all day with good saturation. Speech Therapy reassessed with Barium Swallow and found no aspiration. Stated to wean TPN and transition to solid food.      10/31: TPN removed due to accidental PICC line removal      - Patient tolerating oral food, hold TPN at this time      - GI stated potential increase of rectal bleed during transition     10.) Diarrhea (Resolved): C. diff test ordered by GI, but not was helped back into bed, and reported no headaches, neck pain, or back pain. There are were no visible injuries. Patient states he is feeling well this morning and is saturating well without the BiPAP. He states that he \"tripped\" out of bed in the morning, but did not hit his head. He denies headaches, chest pain, shortness of breath, nausea, vomiting, diarrhea, neck pain, or back pain. He is able to eat solid food, and TPN is now on hold due to no IV access. Will order an IV line to be placed, continue antibiotics. Past Medical History, Past Surgical History, Allergies, Medications, Social History, Family History reviewed in H&P and remain unchanged from admission. Diet:  DIET DYSPHAGIA MINCED AND MOIST; Carb Control: 3 carb choices (45 gms)/meal; No Added Salt (3-4 GM); No Drinking Straw  Dietary Nutrition Supplements: Diabetic Oral Supplement    Review of Systems:      Review of Systems - General ROS: negative for - chills, fatigue or fever  Respiratory ROS: no cough, shortness of breath, or wheezing  Cardiovascular ROS: no chest pain or dyspnea on exertion  Gastrointestinal ROS: no abdominal pain, change in bowel habits, or black or bloody stools  Genito-Urinary ROS: no dysuria, trouble voiding, or hematuria  Musculoskeletal ROS: negative for - joint stiffness, joint swelling, muscle pain or muscular weakness  Neurological ROS: negative for - confusion, dizziness, headaches, seizures or tremors  Dermatological ROS: negative for - pruritus, rash or skin lesion changes    Physical exam:    /82   Pulse 87   Temp 98.9 °F (37.2 °C) (Axillary)   Resp 16   Ht 5' 8\" (1.727 m)   Wt 275 lb 4.8 oz (124.9 kg)   SpO2 100%   BMI 41.86 kg/m²     General appearance:  Laying in bed on side, Able to answer questions appropriately, Obese  HEENT:  Normal cephalic, atraumatic without obvious deformity. Pupils equal, round, and reactive to light. Conjunctivae/corneas clear.   Neck: Supple, with full range of motion. No jugular venous distention. Trachea midline. Respiratory:  BiPAP removed today, increased respiratory effort, coarse breath sounds, difficult to hear beyond noise from BiPAP machine. Cardiovascular:  Regular Rate and rhythm with normal S1/S2 without murmurs, rubs or gallops. Abdomen: Soft, non-tender, non-distended with normal bowel sounds. Musculoskeletal:  No clubbing or cyanosis, mild edema in lower limbs bilaterally. Full range of motion without deformity. Skin: Skin color, texture, turgor normal.  No rashes or lesions. Dry skin around borders of face  Neurologic:  Limited due to inability to follow commands/stay awake  Capillary Refill: Brisk,< 3 seconds   Peripheral Pulses: +2 palpable, equal bilaterally       Labs:     Recent Labs     11/03/20 0602 11/04/20 0540 11/05/20 0544   WBC 8.4 8.4 8.1   HGB 9.5* 9.5* 9.7*   HCT 31.2* 30.8* 32.8*    378 369     Recent Labs     11/03/20 0602 11/04/20 0540 11/05/20  0544    145 144   K 3.9 3.7 3.5    105 105   CO2 24 26 24   BUN 11 9 7   CREATININE 2.0* 1.7* 1.7*   CALCIUM 9.8 10.2 10.2     No results for input(s): AST, ALT, BILIDIR, BILITOT, ALKPHOS in the last 72 hours. No results for input(s): INR in the last 72 hours. No results for input(s): Jose Snowball in the last 72 hours. Urinalysis:      Lab Results   Component Value Date    NITRU NEGATIVE 11/03/2020    WBCUA 25-50 11/03/2020    BACTERIA FEW 11/03/2020    RBCUA 3-5 11/03/2020    BLOODU NEGATIVE 11/03/2020    SPECGRAV >=1.030 10/06/2020    GLUCOSEU NEGATIVE 11/03/2020       Intake & Output:  I/O last 3 completed shifts: In: 2077.8 [P.O.:480; I.V.:1597.8]  Out: 700 [Urine:700]  No intake/output data recorded. Radiology:     CXR 10/23:  I have reviewed the CXR with the following interpretation: Poor inflation of lungs, borderline heart size, no effusions, PICC line right with catheter tip in cavoatrial junction, mild infiltrate scattering in both lungs, worsened appearance from prior study    XR CHEST PORTABLE   Final Result   Persistent mild bilateral patchy pulmonary opacities. **This report has been created using voice recognition software. It may contain minor errors which are inherent in voice recognition technology. **      Final report electronically signed by Dr. Ezra Pettit on 11/1/2020 1:48 PM      FL MODIFIED BARIUM SWALLOW W VIDEO   Final Result   1. Laryngeal penetration of thin barium without evidence of aspiration. 2. Additional recommendations from the speech therapist will follow. **This report has been created using voice recognition software. It may contain minor errors which are inherent in voice recognition technology. **      Final report electronically signed by Dr. Keely Gama on 10/30/2020 11:25 AM      XR CHEST PORTABLE   Final Result   1. Poor inflation lungs. Borderline heart size. No effusion. 2. Right arm PICC line, catheter tip the cavoatrial junction. 3. Mild infiltrate scattered in both lungs and left infrahilar region. 4. Overall appearance slightly worsened from prior study. **This report has been created using voice recognition software. It may contain minor errors which are inherent in voice recognition technology. **      Final report electronically signed by Dr. Nick Barajas on 10/23/2020 10:46 AM      CT ABDOMEN PELVIS WO CONTRAST Additional Contrast? None   Final Result   1. Almost complete resolution of previously seen airspace infiltrates in the lung bases. 2. No acute abdominal or pelvic abnormalities. **This report has been created using voice recognition software. It may contain minor errors which are inherent in voice recognition technology. **      Final report electronically signed by Dr. Wil Renner on 10/20/2020 10:50 AM      CT HEAD WO CONTRAST   Final Result    No evidence of acute intracranial abnormality.                **This report has been created using voice recognition software. It may contain minor errors which are inherent in voice recognition technology. **      Final report electronically signed by Dr. Ollie Banerjee MD on 10/20/2020 10:13 AM      XR CHEST PORTABLE   Final Result   Ill-defined bilateral lung opacities. Follow-up as clinically indicated. This document has been electronically signed by: Khadra Barber MD on 10/20/2020 05:38 AM         XR CHEST PORTABLE   Final Result   Minimal residual atelectasis and/or infiltrate within the bilateral mid    and lower lungs with improvement. Improved pulmonary inflation. This document has been electronically signed by: Rosa Weaver MD on    10/16/2020 02:02 AM         XR CHEST PORTABLE   Final Result   Impression:   Progressive bilateral consolidations or ARDS. This document has been electronically signed by: Arvind Bolivar MD on    10/12/2020 04:59 AM         XR CHEST PORTABLE   Final Result    Similar bilateral ill-defined pneumonia. This document has been electronically signed by: Manuel Segura. Rufus Khan DO on    10/11/2020 09:49 AM         XR CHEST PORTABLE   Final Result   Similar diffuse patchy bilateral airspace disease and consolidations. Support devices as above. This document has been electronically signed by: Clive Yu MD on    10/10/2020 04:35 AM         XR CHEST PORTABLE   Final Result   No acute findings. This document has been electronically signed by: Khadra Barber MD on 10/08/2020 07:40 AM         CT ABDOMEN PELVIS WO CONTRAST Additional Contrast? Oral   Final Result    IMPRESSION:   Bilateral lower lobe pneumonia. Known Covid. Continued progress imaging is advised   Distended colon with fluid, air and stool. Correlate for diarrhea. **This report has been created using voice recognition software. It may contain minor errors which are inherent in voice recognition technology. **      Final report electronically signed by Dr. Yolande Paz on 10/7/2020 6:49 PM      XR CHEST PORTABLE   Final Result   Bilateral lung consolidation not significant change. This document has been electronically signed by: Jyoti Carrera MD on 10/07/2020 07:25 AM         US RENAL COMPLETE   Final Result   Unremarkable kidneys. This document has been electronically signed by: Tawny Nation MD on    10/07/2020 01:48 AM         XR CHEST PORTABLE   Final Result   1. There is a new endotracheal tube with the distal tip 1.8 cm above the level of the madi. 2. There is a new esophageal tube with the distal tip projecting over the gastric fundus. 3. There is a stable right PICC with the distal tip projecting over the right atrium. 4. The lung volumes are diminished. There are bilateral perihilar and the basilar opacities which are similar to the previous examination however may be accentuated by the expiratory technique. There is no pleural effusion. Follow-up chest radiographs    are recommended to confirm complete resolution. **This report has been created using voice recognition software. It may contain minor errors which are inherent in voice recognition technology. **      Final report electronically signed by Dr. Kelly Adair on 10/6/2020 7:18 AM      XR CHEST PORTABLE   Final Result   Bilateral lung opacities. Follow-up to clearing recommended. This document has been electronically signed by: Jyoti Carrera MD on 10/06/2020 06:09 AM         XR CHEST PORTABLE   Final Result   Slightly decreased diffuse patchy bilateral airspace disease. Support devices as above. This document has been electronically signed by: Tory Hills MD on    10/03/2020 05:15 AM         XR CHEST PORTABLE   Final Result   ET tube should be retracted 1.5-2.0 cm. No significant change in coarse scattered bilateral infiltrates. This document has been electronically signed by:  Ayad Hernandez Bart Chacon MD on    09/30/2020 06:50 AM         XR CHEST PORTABLE   Final Result      Slightly improving bilateral pneumonia. **This report has been created using voice recognition software. It may contain minor errors which are inherent in voice recognition technology. **      Final report electronically signed by Dr. Benson Davis on 9/27/2020 2:23 PM      XR ABDOMEN FOR NG/OG/NE TUBE PLACEMENT   Final Result   Esophageal route tube tip in the stomach. **This report has been created using voice recognition software. It may contain minor errors which are inherent in voice recognition technology. **      Final report electronically signed by Dr Yumi Castaneda on 9/26/2020 10:22 AM      XR CHEST PORTABLE   Final Result   1. Lines and tubes as above. 2. Worsening bilateral pneumonia. **This report has been created using voice recognition software. It may contain minor errors which are inherent in voice recognition technology. **      Final report electronically signed by Dr. Benson Davis on 9/24/2020 7:38 AM      XR CHEST PORTABLE   Final Result   1. Endotracheal tube terminates 3.1 cm above the madi. 2.  Orogastric tube in the stomach. 3.  Patchy opacities in the right upper lobe and lingula. This document has been electronically signed by: Barbara Faulkner MD on    09/24/2020 12:35 AM         XR CHEST PORTABLE   Final Result   1. Bilateral pulmonary opacification worse than on previous study dated 10 September 2020. This may represent worsening inflammatory process, possibly Covid 19 infection. Please correlate clinically   2. Borderline cardiomegaly. **This report has been created using voice recognition software. It may contain minor errors which are inherent in voice recognition technology. **      Final report electronically signed by DR Frantz Garcia on 9/23/2020 10:47 AM           DVT prophylaxis: Lovenox, held for GI bleed, initiate SCDs, May consider

## 2020-11-05 NOTE — PROGRESS NOTES
[facility]    Renal Inpatient Follow-up note  11/5/2020 7:53 AM       Pt Name:   Rush Narayan  YOB: 1970  Attending:   Damian Redman MD     Chief Complaint : Rush Narayan is a 44 y.o. male beingfollowed by nephrology for Acute Kidney Injury     Hospital Course:   Justyna Epstein is a 41-year-old male never smoker with a past medical history significant for DURAN KIARA, HLD, HTN, HFpEF, GERD, DM 2, gout, CAD, BPH, atrial fibrillation on chronic anticoagulation who presented to [facility] on 9/23/2020 for worsening shortness of breath. Patient was known to have had previous hospitalization on 9/6/2020 through 9/15/2020 for hypoxemic respiratory failure secondary to COVID-19 pneumonia. Patient was noted to have completed a course of Decadron, remdesivir, and danazol and was discharged home on subcutaneous Lovenox. Patient quickly deteriorated requiring intubation and underwent subsequent bronchoscopy on 9/27/2020 which demonstrated no mucus production. Patient was extubated on 10/2/2020 and reintubated for progressive respiratory failure and obtundation on 10/6/2020. Patient was noted to have acute oliguric renal failure secondary to prerenal etiology with suspicion for sepsis. Patient underwent volume resuscitation but urine output did not improve. Patient underwent empiric treatment with Zosyn and doxycycline and sputum was shown to have grown staph aureus. Patient was extubated on 10/15/2020 following his trial spontaneous breathing. Interval History : Patient seen and evaluated bedside this morning. Patient reports poor appetite as he has not been happy with food. Patient reports that he last had a bowel movement 3 days ago. Patient otherwise reports that he has had no nausea, vomiting, diarrhea, constipation. Patient not tolerating BiPAP this morning and was notably tachypneic when BiPAP was reinitiated.      Scheduled Medications :   potassium bicarb-citric acid  40 mEq Oral BID    tamsulosin  0.8 mg Oral Nightly    darbepoetin alejandro-polysorbate  60 mcg Subcutaneous Weekly - Thursday    pantoprazole  40 mg Intravenous BID    vancomycin (VANCOCIN) intermittent dosing (placeholder)   Other RX Placeholder    sodium chloride flush  10 mL Intravenous 2 times per day    ferrous sulfate  325 mg Oral BID WC    insulin lispro  0-12 Units Subcutaneous Q6H    gabapentin  400 mg Oral BID    modafinil  100 mg Oral Daily    [Held by provider] enoxaparin  40 mg Subcutaneous BID    [Held by provider] insulin glargine  38 Units Subcutaneous Nightly    lidocaine 1 % injection  5 mL Intradermal Once    magnesium replacement protocol   Other RX Placeholder    phosphorus replacement protocol   Other RX Placeholder    calcium replacement protocol   Other RX Placeholder    [Held by provider] ARIPiprazole  15 mg Oral Daily    [Held by provider] aspirin  81 mg Oral Daily    glycopyrrolate-formoterol  2 puff Inhalation BID      dextrose      sodium chloride 75 mL/hr at 11/04/20 2214       Vitals :  /82   Pulse 87   Temp 98.9 °F (37.2 °C) (Axillary)   Resp 16   Ht 5' 8\" (1.727 m)   Wt 275 lb 4.8 oz (124.9 kg)   SpO2 100%   BMI 41.86 kg/m²     24HR INTAKE/OUTPUT:      Intake/Output Summary (Last 24 hours) at 11/5/2020 0753  Last data filed at 11/5/2020 1075  Gross per 24 hour   Intake 2077.79 ml   Output 700 ml   Net 1377.79 ml        Physical Exam :  General appearance: Patient is a middle-aged obese male lying in bed in no acute distress  HEENT: Pupils equal, round, and reactive to light. Conjunctivae/corneas clear. Neck: No jugular venous distention. Trachea midline. Respiratory:  Normal respiratory effort. Clear to auscultation, bilaterally without Rales/Wheezes/Rhonchi. Cardiovascular: Regular rate and rhythm with normal S1/S2 without murmurs, rubs or gallops.   Abdomen: Protuberant, soft, non-tender, non-distended with normal bowel sounds. Musculoskeletal: passive and active ROM x 4 extremities. Skin: Skin color, texture, turgor normal.  No rashes or lesions. Neurologic:  Neurovascularly intact without any focal sensory/motor deficits. Grossly non-focal.  Psychiatric: Alert and oriented, thought content appropriate, normal insight  Capillary Refill: Brisk,< 3 seconds   Peripheral Pulses: +2 palpable, equal bilaterally      Last 3 CBC  Recent Labs     11/03/20  0602 11/04/20  0540 11/05/20  0544   WBC 8.4 8.4 8.1   RBC 3.22* 3.18* 3.31*   HGB 9.5* 9.5* 9.7*   HCT 31.2* 30.8* 32.8*   MCV 96.9* 96.9* 99.1*    378 369     Last 3 CMP  Recent Labs     11/03/20  0602 11/04/20  0540 11/05/20  0544    145 144   K 3.9 3.7 3.5    105 105   CO2 24 26 24   BUN 11 9 7   CREATININE 2.0* 1.7* 1.7*   CALCIUM 9.8 10.2 10.2        Assessment / Plan :  1. Severe pneumonia: Initially resolved, now having intermittent fevers. Patient has complicated medical course. Had discharge on 9/15/2020 for COVID-19 pneumonia and was readmitted on 9/23/2020 requiring intubation. Patient has been on vancomycin and Zosyn for 10+ days. Vancomycin level WNL  Respiratory panel negative. Respiratory cultures negative. Infectious diseases following. 2. KRSITY on CKD: Nephrology reconsulted for worsening KRISTY. Originally thought to be prerenal in the context of sepsis pneumonia. Suspect some element of post obstructive uropathy as creatinine improved after mason restarted. Patient also noted to be on 10+ days Vancomycin and Zosyn. Vanc levels w/in normal limits. Flomax resumed  3. Urinary retention: Secondary to BPH patient has had chronic urinary retention during admission requiring Mason catheter. Flomax resumed. Patient has been requiring intermittent straight cathing with elevated creatinine. 4. Acute normocytic Anemia: due to rectal bleeding in the context of rectal ulcer. Hemoglobin stable and rising. H&H 9.5/31.2 today  5.  HFpEF: Patient has

## 2020-11-05 NOTE — PROGRESS NOTES
[address] [place] [facility]  STRZ ICU STEPDOWN TELEMETRY 4K  DAILY NOTE    TIME   SLP Individual Minutes  Time In: 8896  Time Out: 1004  Minutes: 8  Timed Code Treatment Minutes: 0 Minutes       Date: 2020  Patient Name: Darice Carrel      CSN: [id number]   : 1970  (44 y.o.)  Gender: male   Referring Physician: Nico Peñaloza MD  Diagnosis: Acute respiratory failure with hypoxia   Secondary Diagnosis: Dysphagia; cognitive deficits   Precautions: Fall risk, aspiration precautions   Current Diet: Minced/moist and thin liquids   Swallowing Strategies: Standard Universal Swallow Precautions and small bites/sips, sit upright, pulmonary monitoring, monitor fatigue, alternate solids/liquids   Date of Last MBS: 10/30/2020    Pain:  No pain reported. Subjective:  Pt seen upright in recliner. Alert and cooperative. Significantly improved alertness levels and overall mentation. Pt reports dislike for minced/moist diet with refusal to eat. Completion of advanced trials to determine appropriateness for upgrade. Plans for pt discharge to ECF this date. Will require ongoing ST intervention for targeting of dysphagia and completion of full cognitive assessment in order to further establish Goals/POC. Short-Term Goals:  SHORT TERM GOAL #1:  Goal 1: Pt will consume a minced/moist diet and thin liquids (no straws) without overt s/s of aspiration and utilization of safe, compensatory swallowing strategies to meet nutritional/hydration measures safely. INTERVENTIONS: See subjective. SHORT TERM GOAL #2:  Goal 2: Pt will consume advanced PO solids and thin liquids by straw with ST only (as alertness and endurance improves) without overt s/s of aspiration in 10/10 trials for potential upgrade to least restrictive PO diet. INTERVENTIONS: Skilled dysphagia therapy via advanced PO solids this date. Pt with x2 trials of cracker. Thin liquids by cup x4.  Demonstrations of mildly impaired mastication with need for extra time to enhance bolus breakdown/formation with conjunction of vertical mastication and rotary chewing pattern noted. No overt oral stasis to follow. Certainly continued concerns for masticatory fatigue and swallow decompensation. Pt refusing ongoing trials. No overt s/s of aspiration. Recommendations to resume minced/moist diet and thin liquids. Would recommend advanced trial of soft/bite sized diet within ECF prior to advancement in diet. SHORT TERM GOAL #3:  Goal 3: Complete comprehensive ST/cognitive assessment and add goals as indicated. INTERVENTIONS: Pt reports improving mentation but admits to slow processing. Will require dynamic assessment. Will hold given pending transfer to [facility]. Orientation:   indep recall of month, year and YVES  Min cues for date (off x1)     Pt asked to recall current events (I.e. current election)-- pt stating \"did Herson Look win\". Improving functional carryover despite longstanding hospitalization. Pt admits to not being able to recall last x3 weeks. Long-Term Goals:    No established LTG's given short ELOS    EDUCATION:  Learner: Patient  Education:  Reviewed results and recommendations of this evaluation, Reviewed diet and strategies, Reviewed signs, symptoms and risks of aspiration, Reviewed ST goals and Plan of Care, Reviewed recommendations for follow-up, Education Related to Potential Risks and Complications Due to Impairment/Illness/Injury and Education Related to Prevention of Recurrence of Impairment/Illness/Injury  Evaluation of Education: Verbalizes understanding, Needs further instruction and Family not present    ASSESSMENT/PLAN:  Activity Tolerance:  Patient tolerance of  treatment: good. Assessment/Plan: Patient progressing toward established goals. Continues to require skilled care of licensed speech pathologist to progress toward achievement of established goals and plan of care. .     Plan for Next Session: N/a--

## 2020-11-05 NOTE — PROGRESS NOTES
0915: Patient is refusing to eat breakfast stating he \"doesn't like this food and wants some real food. \"   1310: Patient continuing to state he wants other food and won't eat this food. Patient did state he would eat [place] ([facility] [place]). Citizen [place] ([facility] [place]) ordered.

## 2020-11-05 NOTE — CARE COORDINATION
11/5/20, 9:20 AM EST    DISCHARGE PLANNING EVALUATION      SW received a call from Shea Praneeth de Yécora with ECF, pre-cert is approved, SW informed tele sitter is out today, they are still able to take.  DASIA informed the nurse and CM  11/5/20, 10:59 AM EST    DISCHARGE PLANNING EVALUATION      SW faxed the bi-pap settings to [facility], they are requesting a new COVID test, if positive, he will need to go to a [facility] facility which will require a new pre-cert

## 2020-11-05 NOTE — PROGRESS NOTES
Pt is resting in bed. Oriented to person and time. Disoriented to place. Responses are delayed. Will not follow commands for pedal push/pull as well as hand grasps. While pt was sleeping, he became apneic. Checked o2 sats, 74% on RA. BiPAP applied and O2 sats increased to 100%. Lung sounds diminished throughout the rt side, clear on left upper lobe, and diminished in lower left lobe. Chest rise is symmetrical. Deep pressure wound on coccyx. A stage 3 pressure wound with serosangiunous drainage is noted, as well as multiple stage 2 pressure wounds with sanguinous drainage located on the inner buttocks.

## 2020-11-05 NOTE — DISCHARGE INSTR - COC
Continuity of Care Form    Patient Name: Mark Meraz   :  1970  MRN:  [id number]    Admit date:  2020  Discharge date:  20      Code Status Order: Limited   Advance Directives:   [facility] Documentation       Date/Time Healthcare Directive Type of Healthcare Directive Copy in [facility] [address] Agent's Name Healthcare Agent's Phone Number    20 1739  No, patient does not have an advance directive for healthcare treatment -- -- -- -- --            Admitting Physician:  Brody Johns MD  PCP: Shante Guaman MD    Discharging Nurse: Lona Tapia Unit/Room#: 4K-10/010-A  Discharging Unit Phone Number: [phone]    Emergency Contact:   Extended Emergency Contact Information  Primary Emergency Contact: Pedro Wilhelm   [address] Phone: [phone]  Relation: Other  Secondary Emergency Contact: Aliya Staley Phone: [phone]  Relation: Parent    Past Surgical History:  Past Surgical History:   Procedure Laterality Date    FOOT SURGERY Right     , R foot osteomyelitis    HIP SURGERY Left 2020    bilateral hip steroid injection, Left HIP FIRST performed by Sukhdev Vasquez MD at [facility] N/A 10/26/2020    SIGMOIDOSCOPY DIAGNOSTIC FLEXIBLE performed by Shelley Leon MD at [address] [place] [address]      as a baby       Immunization History: There is no immunization history on file for this patient. Active Problems:  Patient Active Problem List   Diagnosis Code    Chronic diastolic congestive heart failure (HCC) I50.32    Atrial fibrillation (HCC) I48.91    BPH (benign prostatic hyperplasia) N40.0    Carpal tunnel syndrome on right G56.01    Chronic gout M1A. 9XX0    DM2 (diabetes mellitus, type 2) ([facility] [address] E11.9    Heart failure with preserved ejection fraction (HCC) I50.30    History of alcohol abuse F10.11    History of osteomyelitis Z87.39    Essential hypertension I10    Hypogonadism, male E29.1    Major depression F32.9    Morbid obesity (HCC) E66.01    DURAN (nonalcoholic steatohepatitis) K75.81    KIARA (obstructive sleep apnea) G47.33    Vitamin D deficiency E55.9    Normocytic anemia D64.9    Physical deconditioning R53.81    Mood disorder (HCC) F39    Hallucinations R44.3    GERD (gastroesophageal reflux disease) K21.9    Wound of buttock S31.809A    Chest wall pain with tenderness R07.89    Primary osteoarthritis of left hip M16.12    COVID-19 U07.1    COVID-19 virus infection U07.1    Acute respiratory failure with hypoxia (HCC) J96.01    ARF (acute renal failure) with tubular necrosis (HCC) N17.0    Hypokalemia E87.6    Other hypotension I95.89    Pneumonia due to COVID-19 virus U07.1, J12.89    Sepsis due to COVID-19 (HCC) U07.1, A41.89    KRISTY (acute kidney injury) ([facility] [address] N17.9    Hypernatremia E87.0    Acute encephalopathy G93.40    Acute on chronic anemia D64.9    Hypomagnesemia E83.42    Dysphagia R13.10    Diarrhea R19.7    At risk for malnutrition Z91.89    Reactive thrombocytosis R79.89       Isolation/Infection:   Isolation            Contact          Patient Infection Status       Infection Onset Added Last Indicated Last Indicated By Review Planned Expiration Resolved Resolved By    MRSA 09/06/20 09/06/20 10/12/20 Culture, Respiratory        ETT 9/2020, 10/2020  Nares 9/2020    VRE 09/06/20 09/06/20 09/06/20 VRE Screen by PCR        PCR 9/2020    Resolved    COVID-19 Rule Out 09/24/20 09/24/20 09/24/20 COVID-19 (Ordered)   09/24/20 75 Austin Purcell RN    COVID-19 Rule Out 09/23/20 09/23/20 09/23/20 COVID-19 (Ordered)   09/24/20 Rule-Out Test Resulted    COVID-19 08/05/20 08/05/20 09/06/20 COVID-19   10/21/20 75 Austin Purcell RN    COVID-19 Rule Out 08/05/20 08/05/20 08/05/20 COVID-19 (Ordered)   08/05/20 Rule-Out Test Resulted    COVID-19 Rule Out 06/23/20 06/23/20 06/23/20 COVID-19 (Ordered)   06/24/20 Rule-Out Test Resulted            Nurse Assessment:  Last Vital Signs: /72   Pulse 84   Temp 98.2 °F (36.8 °C) (Oral)   Resp 16   Ht 5' 8\" (1.727 m)   Wt 275 lb 4.8 oz (124.9 kg)   SpO2 95%   BMI 41.86 kg/m²     Last documented pain score (0-10 scale): Pain Level: 0  Last Weight:   Wt Readings from Last 1 Encounters:   11/05/20 275 lb 4.8 oz (124.9 kg)     Mental Status:  oriented and alert    IV Access:  - None    Nursing Mobility/ADLs:  Walking   Assisted  Transfer  Assisted  Bathing  Assisted  Dressing  Assisted  Toileting  Assisted  Feeding  Assisted  Med Admin  Assisted  Med Delivery   whole    Wound Care Documentation and Therapy:  Wound 10/12/20 Perineum (Active)   Wound Etiology Pressure Unstageable 11/05/20 0315   Dressing Status Dry;Clean 11/04/20 1531   Wound Cleansed Soap and water 10/30/20 0000   Dressing/Treatment Protective barrier 11/05/20 0315   Wound Assessment Erythema 11/05/20 0315   Drainage Amount None 11/04/20 1531   Drainage Description Sanguinous 11/04/20 1531   Sarahi-wound Assessment Excoriated 11/05/20 0315   Number of days: 23       Wound 10/13/20 Scrotum Posterior (Active)   Wound Etiology Skin Tear 11/05/20 0315   Dressing Status Dry;Clean 11/04/20 1531   Wound Cleansed Soap and water 10/30/20 0000   Dressing/Treatment Protective barrier 11/05/20 0315   Wound Assessment Erythema 11/05/20 0315   Drainage Amount Small 11/04/20 1531   Drainage Description Serosanguinous 11/04/20 1531   Sarahi-wound Assessment Blanchable erythema 11/05/20 0315   Number of days: 22       Wound 10/28/20 Coccyx stage 2 red open area (Active)   Wound Etiology Pressure Stage  2 11/05/20 0315   Wound Cleansed Soap and water 10/30/20 0000   Dressing/Treatment Protective barrier 11/05/20 0315   Wound Assessment Bleeding;Erythema;Non-blanchable erythema;Pale granulation tissue 11/05/20 0315   Drainage Amount Scant 11/03/20 1600   Drainage Description Serosanguinous; Yellow 11/04/20 1531   Sarahi-wound Assessment Fragile; Non-blanchable erythema 11/05/20 0315   Number of days: 8       Wound 10/28/20 Buttocks DTI buttocks (Active)   Wound Etiology Deep tissue/Injury 11/05/20 0315   Wound Cleansed Soap and water 10/30/20 0000   Dressing/Treatment Protective barrier 11/05/20 0315   Wound Assessment Dry 11/05/20 0315   Drainage Amount None 11/03/20 1600   Sarahi-wound Assessment Non-blanchable erythema 11/05/20 0315   Number of days: 8       Wound 10/28/20 Buttocks stage 2 mutiple areas on buttocks (Active)   Wound Etiology Pressure Stage  3 11/05/20 0315   Wound Cleansed Soap and water 10/30/20 0000   Dressing/Treatment Protective barrier 11/05/20 0315   Wound Assessment Bleeding;Erythema 11/05/20 0315   Drainage Amount Scant 11/03/20 1600   Drainage Description Serous 11/03/20 1600   Sarahi-wound Assessment Non-blanchable erythema 11/05/20 0315   Number of days: 8        Elimination:  Continence:   · Bowel: Yes  · Bladder: Yes  Urinary Catheter: Insertion Date: 11/3/20   Colostomy/Ileostomy/Ileal Conduit: No  [REMOVED] Rectal Tube With balloon-Stool Appearance: Loose, Watery  [REMOVED] Rectal Tube With balloon-Stool Color: Brown  [REMOVED] Rectal Tube With balloon-Stool Amount: Smear    Date of Last BM: 11/5/20    Intake/Output Summary (Last 24 hours) at 11/5/2020 0919  Last data filed at 11/5/2020 0306  Gross per 24 hour   Intake 2077.79 ml   Output 700 ml   Net 1377.79 ml     I/O last 3 completed shifts: In: 2077.8 [P.O.:480; I.V.:1597.8]  Out: 700 [Urine:700]    Safety Concerns: At Risk for Falls    Impairments/Disabilities:      None    Nutrition Therapy:  Current Nutrition Therapy:   - Oral Diet:  Dysphagia 2 mechanically altered minced and moist. Carb Control Diet. Routes of Feeding: Oral  Liquids:  Thin Liquids  NO STRAWS  Daily Fluid Restriction: no  Last Modified Barium Swallow with Video (Video Swallowing Test):     Treatments at the Time of Hospital Discharge:   Respiratory Treatments: see med rec  Oxygen Therapy:  is not on home oxygen therapy. Ventilator:    - Bipap at night and with naps. Settings 22/6    Rehab Therapies: Physical Therapy and Occupational Therapy  Weight Bearing Status/Restrictions: No weight bearing restirctions  Other Medical Equipment (for information only, NOT a DME order): Kiana Mungo, or 2 assist walker. Other Treatments: ***    Patient's personal belongings (please select all that are sent with patient):  None    RN SIGNATURE:  Electronically signed by Callie Baez RN on 11/5/20 at 11:45 AM EST    CASE MANAGEMENT/SOCIAL WORK SECTION    Inpatient Status Date:  09/23/2020    Readmission Risk Assessment Score:  Readmission Risk              Risk of Unplanned Readmission:        58           Discharging to Facility/ Agency   · [facility]  · Address:  [address] [place] [address], [facility] [address]  · Phone:  [phone]  · Fax:  [phone]    Dialysis Facility (if applicable)   · Name:  · Address:  · Dialysis Schedule:  · Phone:  · Fax:    / signature: Electronically signed by RAYMOND Heath on 11/5/20 at 9:28 AM EST    PHYSICIAN SECTION    Prognosis: Fair    Condition at Discharge: Stable    Rehab Potential (if transferring to Rehab): Fair    Recommended Labs or Other Treatments After Discharge: BiPAP, PT/OT    Physician Certification: I certify the above information and transfer of Ela Feliciano  is necessary for the continuing treatment of the diagnosis listed and that he requires [facility] for greater 30 days.      Update Admission H&P: No change in H&P    PHYSICIAN SIGNATURE:  Electronically signed by Delfino Kruger MD on 11/5/20 at 9:30 AM EST

## 2020-11-05 NOTE — FLOWSHEET NOTE
11/04/20 2020   Provider Notification   Reason for Communication Evaluate  (pt lethargic)   Provider Name Dr. Moris Coleman   Provider Notification Resident   Method of Communication Secure Message   Response Waiting for response   Notification Time 2020     Updated resident that patient very lethargic upon initial assessment. Opened eyes to voice, but easily feel back asleep. Placed patient on his bipap for an hour and still lethargic. ABGs were ordered. Results are not in the system but therapist recorded results. PH 7.37, CO2 49, PO2 86.4, HCO3 28.5, BE 2.7, SO2 96%.

## 2020-11-06 VITALS
BODY MASS INDEX: 41.85 KG/M2 | OXYGEN SATURATION: 96 % | DIASTOLIC BLOOD PRESSURE: 73 MMHG | HEART RATE: 82 BPM | SYSTOLIC BLOOD PRESSURE: 117 MMHG | TEMPERATURE: 98.2 F | HEIGHT: 68 IN | RESPIRATION RATE: 18 BRPM | WEIGHT: 276.1 LBS

## 2020-11-06 LAB
ANION GAP SERPL CALCULATED.3IONS-SCNC: 12 MEQ/L (ref 8–16)
BASOPHILS # BLD: 0.8 %
BASOPHILS ABSOLUTE: 0.1 THOU/MM3 (ref 0–0.1)
BUN BLDV-MCNC: 5 MG/DL (ref 7–22)
CALCIUM SERPL-MCNC: 9.8 MG/DL (ref 8.5–10.5)
CHLORIDE BLD-SCNC: 106 MEQ/L (ref 98–111)
CO2: 25 MEQ/L (ref 23–33)
CREAT SERPL-MCNC: 1.6 MG/DL (ref 0.4–1.2)
EOSINOPHIL # BLD: 8.1 %
EOSINOPHILS ABSOLUTE: 0.6 THOU/MM3 (ref 0–0.4)
ERYTHROCYTE [DISTWIDTH] IN BLOOD BY AUTOMATED COUNT: 16.5 % (ref 11.5–14.5)
ERYTHROCYTE [DISTWIDTH] IN BLOOD BY AUTOMATED COUNT: 59.1 FL (ref 35–45)
GFR SERPL CREATININE-BSD FRML MDRD: 55 ML/MIN/1.73M2
GLUCOSE BLD-MCNC: 82 MG/DL (ref 70–108)
GLUCOSE BLD-MCNC: 83 MG/DL (ref 70–108)
GLUCOSE BLD-MCNC: 87 MG/DL (ref 70–108)
GLUCOSE BLD-MCNC: 90 MG/DL (ref 70–108)
GLUCOSE BLD-MCNC: 90 MG/DL (ref 70–108)
GLUCOSE BLD-MCNC: 91 MG/DL (ref 70–108)
HCT VFR BLD CALC: 29.3 % (ref 42–52)
HEMOGLOBIN: 8.8 GM/DL (ref 14–18)
IMMATURE GRANS (ABS): 0.03 THOU/MM3 (ref 0–0.07)
IMMATURE GRANULOCYTES: 0.4 %
LYMPHOCYTES # BLD: 17.9 %
LYMPHOCYTES ABSOLUTE: 1.3 THOU/MM3 (ref 1–4.8)
MCH RBC QN AUTO: 29.2 PG (ref 26–33)
MCHC RBC AUTO-ENTMCNC: 30 GM/DL (ref 32.2–35.5)
MCV RBC AUTO: 97.3 FL (ref 80–94)
MONOCYTES # BLD: 9.7 %
MONOCYTES ABSOLUTE: 0.7 THOU/MM3 (ref 0.4–1.3)
NUCLEATED RED BLOOD CELLS: 0 /100 WBC
PLATELET # BLD: 355 THOU/MM3 (ref 130–400)
PMV BLD AUTO: 10.7 FL (ref 9.4–12.4)
POTASSIUM SERPL-SCNC: 3.6 MEQ/L (ref 3.5–5.2)
RBC # BLD: 3.01 MILL/MM3 (ref 4.7–6.1)
SEG NEUTROPHILS: 63.1 %
SEGMENTED NEUTROPHILS ABSOLUTE COUNT: 4.7 THOU/MM3 (ref 1.8–7.7)
SODIUM BLD-SCNC: 143 MEQ/L (ref 135–145)
WBC # BLD: 7.4 THOU/MM3 (ref 4.8–10.8)

## 2020-11-06 PROCEDURE — 2580000003 HC RX 258: Performed by: FAMILY MEDICINE

## 2020-11-06 PROCEDURE — 6370000000 HC RX 637 (ALT 250 FOR IP): Performed by: INTERNAL MEDICINE

## 2020-11-06 PROCEDURE — 94660 CPAP INITIATION&MGMT: CPT

## 2020-11-06 PROCEDURE — 99232 SBSQ HOSP IP/OBS MODERATE 35: CPT | Performed by: INTERNAL MEDICINE

## 2020-11-06 PROCEDURE — 99239 HOSP IP/OBS DSCHRG MGMT >30: CPT | Performed by: INTERNAL MEDICINE

## 2020-11-06 PROCEDURE — 6370000000 HC RX 637 (ALT 250 FOR IP): Performed by: STUDENT IN AN ORGANIZED HEALTH CARE EDUCATION/TRAINING PROGRAM

## 2020-11-06 PROCEDURE — 85025 COMPLETE CBC W/AUTO DIFF WBC: CPT

## 2020-11-06 PROCEDURE — 6370000000 HC RX 637 (ALT 250 FOR IP): Performed by: NURSE PRACTITIONER

## 2020-11-06 PROCEDURE — 80048 BASIC METABOLIC PNL TOTAL CA: CPT

## 2020-11-06 PROCEDURE — 92526 ORAL FUNCTION THERAPY: CPT

## 2020-11-06 PROCEDURE — 94760 N-INVAS EAR/PLS OXIMETRY 1: CPT

## 2020-11-06 PROCEDURE — 82948 REAGENT STRIP/BLOOD GLUCOSE: CPT

## 2020-11-06 PROCEDURE — 94640 AIRWAY INHALATION TREATMENT: CPT

## 2020-11-06 PROCEDURE — 36415 COLL VENOUS BLD VENIPUNCTURE: CPT

## 2020-11-06 PROCEDURE — 2580000003 HC RX 258: Performed by: INTERNAL MEDICINE

## 2020-11-06 RX ORDER — MODAFINIL 100 MG/1
100 TABLET ORAL DAILY
Qty: 30 TABLET | Refills: 0 | Status: SHIPPED | OUTPATIENT
Start: 2020-11-07 | End: 2020-12-07

## 2020-11-06 RX ORDER — ALBUTEROL SULFATE 90 UG/1
2 AEROSOL, METERED RESPIRATORY (INHALATION) EVERY 6 HOURS PRN
Qty: 1 INHALER | Refills: 3 | Status: SHIPPED | OUTPATIENT
Start: 2020-11-06 | End: 2022-09-12

## 2020-11-06 RX ORDER — FERROUS SULFATE 325(65) MG
325 TABLET ORAL 2 TIMES DAILY WITH MEALS
Qty: 30 TABLET | Refills: 3 | Status: SHIPPED | OUTPATIENT
Start: 2020-11-06 | End: 2022-09-06

## 2020-11-06 RX ORDER — GABAPENTIN 400 MG/1
400 CAPSULE ORAL 2 TIMES DAILY
Qty: 90 CAPSULE | Refills: 3 | Status: SHIPPED | OUTPATIENT
Start: 2020-11-06 | End: 2021-10-20 | Stop reason: ALTCHOICE

## 2020-11-06 RX ADMIN — FERROUS SULFATE TAB 325 MG (65 MG ELEMENTAL FE) 325 MG: 325 (65 FE) TAB at 08:42

## 2020-11-06 RX ADMIN — FERROUS SULFATE TAB 325 MG (65 MG ELEMENTAL FE) 325 MG: 325 (65 FE) TAB at 16:39

## 2020-11-06 RX ADMIN — POTASSIUM BICARBONATE 40 MEQ: 782 TABLET, EFFERVESCENT ORAL at 08:42

## 2020-11-06 RX ADMIN — GLYCOPYRROLATE AND FORMOTEROL FUMARATE 2 PUFF: 9; 4.8 AEROSOL, METERED RESPIRATORY (INHALATION) at 07:38

## 2020-11-06 RX ADMIN — MODAFINIL 100 MG: 100 TABLET ORAL at 05:43

## 2020-11-06 RX ADMIN — GLYCOPYRROLATE AND FORMOTEROL FUMARATE 2 PUFF: 9; 4.8 AEROSOL, METERED RESPIRATORY (INHALATION) at 17:46

## 2020-11-06 RX ADMIN — SODIUM CHLORIDE: 9 INJECTION, SOLUTION INTRAVENOUS at 01:20

## 2020-11-06 RX ADMIN — GABAPENTIN 400 MG: 400 CAPSULE ORAL at 08:42

## 2020-11-06 RX ADMIN — SODIUM CHLORIDE, PRESERVATIVE FREE 10 ML: 5 INJECTION INTRAVENOUS at 08:42

## 2020-11-06 ASSESSMENT — PAIN SCALES - GENERAL
PAINLEVEL_OUTOF10: 0
PAINLEVEL_OUTOF10: 0

## 2020-11-06 NOTE — CARE COORDINATION
11/6/20, 9:02 AM EST  Patient is discharged today to [facility] by squad. He is Medicare payment, pre-cert is still active. SWinformed Pat with ECF of the discharge request time. DASIA provided the nurse with the phone number for report--[phone] and fax number--[phone] to complete the discharge. DASIA faxed request for bi-pap yesterday to [facility]  Patient goals/plan/ treatment preferences discussed by  and . Patient goals/plan/ treatment preferences reviewed with patient/ family. Patient/ family verbalize understanding of discharge plan and are in agreement with goal/plan/treatment preferences. Understanding was demonstrated using the teach back method. AVS provided by RN at time of discharge, which includes all necessary medical information pertaining to the patients current course of illness, treatment, post-discharge goals of care, and treatment preferences. Services After Discharge  Services At/After Discharge:  In ambulance, Nursing Services, [facility], Skilled Therapy([facility])   IMM Letter  IMM Letter given to Patient/Family/Significant other/Guardian/POA/by[de-identified]   IMM Letter date given[de-identified] 11/05/20  IMM Letter time given[de-identified] 3468

## 2020-11-06 NOTE — CARE COORDINATION
11/6/20, 7:52 AM EST    DISCHARGE PLANNING EVALUATION      All needed paperwork for ECF is complete, able to go if ready clinically, pre-cert still good

## 2020-11-06 NOTE — PROGRESS NOTES
Discharge teaching and instructions for diagnosis/procedure of sepsis completed with patient using teachback method. AVS reviewed. Printed prescriptions given to patient. Patient voiced understanding regarding prescriptions, follow up appointments, and care of self at home.  Discharged via cart/stretcher to  skilled nursing per EMS transportation

## 2020-11-06 NOTE — PROGRESS NOTES
[address] [place] THERAP[facility]  STRZ ICU STEPDOWN TELEMETRY 4K  DAILY NOTE    TIME   SLP Individual Minutes  Time In: 4459  Time Out: 0821  Minutes: 18  Timed Code Treatment Minutes: 0 Minutes       Date: 2020  Patient Name: Teri Herrera      CSN: [id number]   : 1970  (44 y.o.)  Gender: male   Referring Physician: Kay Bella MD  Diagnosis: Acute respiratory failure with hypoxia   Secondary Diagnosis: Dysphagia; cognitive deficits   Precautions: Fall risk, aspiration precautions   Current Diet: Minced/moist and thin liquids   Swallowing Strategies: Standard Universal Swallow Precautions and small bites/sips, sit upright, pulmonary monitoring, monitor fatigue, alternate solids/liquids   Date of Last MBS: 10/30/2020    Pain:  No pain reported. Subjective:  Pt sleeping in bed upon arrival. Pt required moderate verbal cues to rouse and maintain alertness. Pt alert and cooperative. Significantly improved alertness levels and overall mentation observed this date. Pt continues to report dislike for minced/moist diet. ST completed advanced trials of dental soft to determine appropriateness for upgrade. Plans for pt discharge to [facility] 2020. Will require ongoing ST intervention for targeting of dysphagia and completion of full cognitive assessment in order to further establish Goals/POC. Short-Term Goals:  SHORT TERM GOAL #1:  Goal 1: Pt will consume a minced/moist diet and thin liquids (no straws) without overt s/s of aspiration and utilization of safe, compensatory swallowing strategies to meet nutritional/hydration measures safely. INTERVENTIONS: See subjective. SHORT TERM GOAL #2:  Goal 2: Pt will consume advanced PO solids and thin liquids by straw with ST only (as alertness and endurance improves) without overt s/s of aspiration in 10/10 trials for potential upgrade to least restrictive PO diet.   INTERVENTIONS: Skilled dysphagia therapy via advanced PO solids this date. Pt with x2 trials of cracker. Thin liquids by spoon sips d/t poor positioning in bed x5. Demonstrations of mildly impaired mastication with need for extra time to enhance bolus breakdown/formation and rotary chewing pattern noted. No overt oral stasis to follow. Pt independent with utilization of lingual sweep and request of liquid wash to assist with bolus formation and clearance. ST with continued concern for masticatory fatigue and swallow decompensation with full meal. Pt refusing ongoing trials. No overt s/s of aspiration. Recommendations to resume minced/moist diet and thin liquids. Would recommend advanced trial of soft/bite sized diet within ECF prior to advancement in diet. SHORT TERM GOAL #3:  Goal 3: Complete comprehensive ST/cognitive assessment and add goals as indicated. INTERVENTIONS: Pt observed with significantly improved alertness levels and overall mentation observed this date. ST to hold speech/language/cog assessment this date given pending transfer to ECF. Pt would benefit from continued skilled ST services to assess cognitive-communication abilities in order to discharge to least restrictive environment safely. Long-Term Goals:    No established LTG's given short ELOS    EDUCATION:  Learner: Patient  Education:  Reviewed results and recommendations of this evaluation, Reviewed diet and strategies, Reviewed signs, symptoms and risks of aspiration, Reviewed ST goals and Plan of Care, Reviewed recommendations for follow-up, Education Related to Potential Risks and Complications Due to Impairment/Illness/Injury and Education Related to Prevention of Recurrence of Impairment/Illness/Injury  Evaluation of Education: Verbalizes understanding, Needs further instruction and Family not present    ASSESSMENT/PLAN:  Activity Tolerance:  Patient tolerance of  treatment: good. Assessment/Plan: Patient progressing toward established goals.   Continues to require skilled care of licensed speech pathologist to progress toward achievement of established goals and plan of care. .     Plan for Next Session: N/a-- pending discharge to [address], [address]

## 2020-11-06 NOTE — DISCHARGE SUMMARY
Hospitalist Discharge Summary        Patient: James Velazquez  YOB: 1970  MRN: [id number]   Acct: [de-identified]    Primary Care Physician: Ally Moreno MD    Admit date  9/23/2020    Discharge date:  11/6/2020 11:40 AM    Chief Complaint on presentation :-  Shortness of breath    Discharge Assessment and Plan:-   - patient must use BIPAP at night and during naps. He may seem altered if he becomes noncompliant with BIPAP, therefore ensure compliance for best results to avoid further hospitalizations. - avoid sedative medications  - Started modafinil, with good clinical response. - Fall precautions, especially at night, patient is known to fall off the bed. - PCP to review the need for OAC/antiplatelet therapy as there was no concrete evidence of a.fib on EKG. There was however evidence of LGIB from perianal lesion. Discussed risks benefits with patient, he wanted to continue aspirin alone. - Speech pathology should evaluate for advancement in diet, patient did not like minced diet and had modest oral intake as a result. - Discharged with Dimas cath, to follow up with urology. 1.) Sepsis due to Sequela of Covid Pneumonia and Bacteremia: resolved. Complicated medical course.  Recent discharge from [facility] for COVID-19 on 9/15/2020. Readmitted for COVID-19 pneumonia on 9/23/2020, intubation on 9/23/2020 and 10/6/2020.  Successfully weaned to room air currently. Per ICU notes patient would be a good candidate for Zyvox, however due to patient's inability to swallow he has been unable to receive Zyvox. Has been treated with Doxycycline and Vancomycin (10/6-10/14), and Zosyn (10/6-10/16) for likely MRSA pneumonia. Patient began having low grade fevers on 10/21, with a temperature of 103.0F on 10/23. Was noted to be septic again with Fever, Tachycardia, and Tachypnea. ID was consulted and started Zosyn for a 7 day course and Vancomycin. Repeat Blood Cultures negative.   UA negative. Possible sources were recurrent pneumonia, rectal wound, or PICC line. PICC line could not be removed due to inability to have an NG tube for nutrition. One Blood Culture returned (+) for Gram Positive Cocci in clusters, however there was no other culture to compare it against.  Respiratory Panel was negative. LA normal, ProCal 4.58 on 10/23/20. Dr. Evelia Tapia following, appreciate his assistance and will follow his recommendations. Antibiotics planned to continue for one week starting on 10/28.  -Leukocytosis resolved, afebrile, on room air, good sats  -Chest x-ray 11/01/20: Persistent mild bilateral patchy pulmonary opacities. -Repeat CBC qd  -Antibiotics and procalcitonin per ID/Dr.Beraki serra, appreciate his assistance.        2.) Acute hypoxic respiratory failure due to COVID-19 pneumonia:  Intubated on 10/6/2020, extubated on 10/15/2020. Was on room air until 10/24, when patient required 2L NC, was placed in ICU on 10/25 for Hypotension, and was started on BiPAP at that time. Began to wean on 10/28. Patient sating at 95% without the mask on 10/30. KIARA on BiPAP -patient needs to be on BiPAP whenever sleeping, including naps, and often while awake. Patient was offered tracheostomy but family refused to consider this option previously.     3.) KRISTY likely obstructive vs prerenal vs AIN:  Creatinine 1.8 - judicious fluid hydration.  Trended up to 4.1 on 10/7/2020. Baseline creatinine 0.6 to 0.8. Nephrology consulted. Started IVF hydration, Cr trended down to 1.5 on 10/24, and has remained stable around 1.3-1.5. Nephrology suggested this is a new baseline. Monitor BMP qd  -Patient's creatinine went up to 1.8 on 11/2/2020  -1000 mL fluid bolus was ordered during the day  -Dimas needed to be reinserted due to urinary retention, renal function improving  -nephrology consulted appreciate their assistance.   -Nephrology ordered urinary indices  -Tamsulosin restarted     4.) Hypernatremia, likely due to dehydration (Resolved): Off D5W, patient had episode of hypernatremia of 146 on 10/23. Started back on 0.45% NS as per Nephrology. TPN stopped, diet advanced. Monitoring BMP qd  - off TPN, patient is eating     5.) Acute encephalopathy, etiology unclear, likely related to recent Covid 19 infection: 10/23 saw Worsening mentation today with fevers, likely septic due to unknown etiology.  Infectious causes include rectal wound, pneumonia, PICC line infection. CT head on 10/20/2020 unremarkable. Patient is still lethargic although mentation at baseline.     6.) Acute on chronic Normocytic anemia due to acute rectal bleeding- secondary to gluteal/rectal ulcerations vs hemodilutional from IVF, (Improving):   -Hemoglobin stable. Baseline Hgb 7-8 in Oct 2020 and around 11 in Sep 2020  -No recent BRBPR, melena. Reportedly patient had clots per rectal tube on October 12 and 3 BM's on Oct 25th w/clots. Patient does have rectal bleed from rectal ulcer from Flexi-Seal. GI stated that supportive measures only would be preformed for the perianal ulcers. States increased PO feeds may worsen the ulcers. Aranesp started on 10/30 by GI for Anemia of Chronic Diseases. -Patient received a total of 6 units so far  -Continue to monitor daily CBC, transfuse per unit protocol     7.) Hypokalemia due to hypomagnesemia and diarrhea, (Resolved):   -Replace per protocol, BMP in am .  -Will restart Effer- K 40 meq BID, if potassium drops while off TPN     8.) Hypomagnesemia likely due to poor oral intake and diarrhea, (Resolved):   -Replace per protocol, check magnesium level in am.     9.) Dysphagia, At risk for Malnutrition: Patient failed Fiberoptic Endoscopic Evaluation of the Swallow (FEES). ST recommend NPO, per ST NGT not an option right now due to failed FEES. Started TPN temporarily on 10/21. Followed dietary recommendation for Free Water addition.   GI stated patient not a good candidate for PEG unless off of BiPAP, and father does not want a tracheostomy performed at this time. On 10/30, patient was without his BiPAP all day with good saturation. Speech Therapy reassessed with Barium Swallow and found no aspiration. Stated to wean TPN and transition to solid food. 10/31: TPN removed due to accidental PICC line removal                                                  - Patient tolerating oral food, hold TPN at this time                                                  - GI stated potential increase of rectal bleed during transition     10.) Diarrhea (Resolved): C. diff test ordered by GI, but not done. May repeat if continue diarrhea.     11.) Sarahi-rectal lesion: Wound ostomy on-board, appreciate input. Cont zinc oxide as ordered.      12. ) Hx of KIARA: Noncompliant with CPAP at home, Pulmonology consulted. Use BiPAP at night. Settings: PEEP 6cm H2O                                                          FiO2 30%                                                          O2 flow rate 2 L/min     13.)  Diabetes mellitus type 2:  Blood sugars within goal range of 140-180. Accu-Cheks q. 4 and at bedtime, Hypoglycemia protocol in place              -holding 38 units Lantus nightly              -POC glucose frequency reduced to q12hrs     14.)  Essential hypertension:  Uncontrolled on admission due to pt not receiving PO meds ( on amlodipine, cardizem and hydralazine PO at home). IV hydralazine prn ordered. Avoid hypotension with recent sepsis and renal failure. SBP stable at 110     15.) Physical Deconditioning:  Patient likely will need SNF versus LTAC at discharge.  Faye evaluation consult ordered.   Palliative care following in discussed case with father who primary decision-maker.     16.) Thrombocytosis, likely reactive due to recent COVID-19 (Resolved): , recheck with CBC in am     17.) Mild hypercalcemia, likely from dehydration (Resolved): Ca 9.0, Continue IVF per Nephrology. Recheck with daily BMP.     18.) Urinary retention: On mason cath, US negative for infection.     Disposition Plan: [place]. Patient is clinically much improved, consider ECF or Physical Rehab center. Initial H and P and Hospital course:-  Mr. Ruth Membreno is a  44year-old morbidly obese black male lifetime non-smoker. Christi Goltz has a history of morbid obesity associated with type 2 diabetes mellitus, prior alcohol abuse, hyperlipidemia, hypertension, hypogonadism, nonalcoholic steatohepatitis, obstructive sleep apnea, and gout.  Patient was hospitalized 9/6/2020 through 9/15/2020 with hypoxemic respiratory failure secondary to COVID-19 associated with diffuse bilateral infiltrates.  At that time, patient received Decadron, remdesivir, and danazol.  During hospitalization, he had issues with atrial fibrillation.  His insulin therapy required adjustment.  He was discharged home on subcutaneous Lovenox. Patient returned back to the emergency room on 9/23/2020 with increasing SOB and progressive hypoxia. CXR showed diffuse infiltrates.  Deteriorated and required intubation. Patient underwent bronchoscopy on 9/27/2020 which demonstrated no mucus production.     Patient extubated 10/2/2020.                 Patient reintubated for progressive respiratory failure with progressive obtundation on 10/6/2020.  This was associated with acute oliguric renal failure.  Patient was intubated 10/6/2020, and underwent volume resuscitation.  Fractional excretion of sodium returned less than 1 which was consistent with prerenal azotemia.  After volume resuscitation, patient demonstrated that he was hemoconcentrated with a drop of 2 g in the hemoglobin. With volume resuscitation, urine output did improve.   After patient was intubated on 10/6/2020, he underwent pulmonary lavage which showed MRSA on the PCR but no other organism.  However, patient was found to have greater than 200 white blood cells in the urine.  Patient was treated with Zosyn and doxycycline. Previously, patient had received vancomycin and developed fever while on vancomycin. Therefore vancomycin was not continued.  Sputum subsequently grew staph aureus. Magy Grad was discontinued but doxycycline continued on 10/14/2020.     Patient did well with spontaneous breathing trial and was extubated 10/15/2020.     10/21: Weaned to room air.  Respiratory status remained stable.  Spiked fever this morning at 100.4 °F.  Possibly due to rectal wound due to Flexi-Seal.  Restarted on IV Zosyn, ID reconsulted.  Patient would be a good candidate for SNF versus LTACH at discharge     10/22: Doing well on room air today. Iker Pottier Zosyn for 5 to 7 days.  Faye evaluation.  Continue zinc oxide for rectal wound.  Blood pressure is elevated today, IV hydralazine for BP greater than 160     10/23: Recurrent fevers up to 103.0 °F today.  Lactic acid ordered and normal.  Restarted vancomycin per ID.  Possible PICC line infection versus worsening pneumonia.  IV fluid bolus given.  Patient tachypneic and tachycardic this morning, improved post fluid administration.      10/24: Patient lethargic and only responds to name. He is unable to stay awake for a complete exam.  He was placed on BiPAP overnight. He did not have a fever overnight. Patient was restarted on Vancomycin yesterday per ID. Patient had mild tachypnea early in the night, however this resolved by morning. The Patient was normocardic throughout the night. Following ID for advice and recommendations.     10/26: Patient more awake today than previous. He is able to answer questions, however limited due to constant use of BiPAP. He was placed in ICU yesterday due to continued blood in his stools as well as   Hypotension. He required 1 unite PRBC this morning. He was not intubated overnight.   A sigmoidoscopy was preformed and showed a large perianal ulcer extending into the anal canal.  GI stated there was nothing they could do to prevent re-bleeding at this point.     10/27: Patient able to answer questions better this morning, however he is still tired/lethargic and cannot stay awake for the entire exam.  He denies chest pain. He points to his mask and states that it is painful and itchy. The Night time nurse reports that he had 3 episodes of passing large clots per his rectum. He has been grabbing at the BiPAP mask and has been succesful in taking it off occasionally, but he desatruates and needs it placed back on. He hit is toe against the bedrail and ripped off his right toenail. The nurse kept it in a sample cup. His HgB is at 7.2 (From 7.6 the night prior). Will recheck HgB later today and transfuse if necsessary. GI states only conservative management can be done for the perianal ulcer at this time. They also state that the patient is not a good candidate for PEG tube placement unless he is off of BiPAP. The patient's father does not want to place a tracheostomy at this time. Patient till on TPN.     10/28: Patient able to answer some questions today. Still lethargic on exam.  He is still removing his BiPAP frequently, although his saturation drops below 90 when it is not on. There were no reported episodes of clots passed overnight. He had a BM that was mostly green in consistency. He required 1 unit of PRBC overnight, HgB currently stable at 7.9. Pre Cert was denied for Faye, starting appeal process. Antibiotics to continue for another week. Nothing else can be done at this time except transfusions and antibiotics. POA states no trach, which means that he will be unable to get a PEG tube.     10/29: Patient asleep for most of exam.  No episodes of Rectal Bleeding seen. Nurses state he is still trying to take the BiPAP off, but quickly desaturates. No units of PRBC had to be transfused. HgB increased to 8.4.   Day 2 of appeal for Faye. No current change in treatment plans.     10/30: Patient asleep for most of exam, however he is able saturating well without the BiPAP. Will still need BiPAP at night for KIARA. No further transfusions of clots passed overnight. HgB stable. Day 3 of appeal for Faye. Since off of BiPAP, Speech Therapy has been consulted to assess dysphagia and ability to eat PO. A barium swallow shows no aspiration. ST states a 2 week long term goal to return patient to functional eating status. Will continue antibiotics.     10/31: Overnight event reported, Patient reported to have fallen out of bed around 0620. The fall was unwitnessed and he was found on the ground face down. He stated no loss of consciousness, and denied hitting his head. The PICC line was pulled out during the incident. He was helped back into bed, and reported no headaches, neck pain, or back pain. There are were no visible injuries. Patient states he is feeling well this morning and is saturating well without the BiPAP. He states that he \"tripped\" out of bed in the morning, but did not hit his head. He denies headaches, chest pain, shortness of breath, nausea, vomiting, diarrhea, neck pain, or back pain. He is able to eat solid food, and TPN is now on hold due to no IV access. Will order an IV line to be placed, continue antibiotics. 11/1 - 11/6:  Physical deconditioning. Tried removing Dimas, patient started retaining, creatinine briefly spiked up. Resume tamsulosin, and reinserted Dimas catheter. Patient discharged with a Dimas catheter to follow-up with urology in 2 weeks. Patient had great success with modafinil. Continue to hold all sedative medications.   Patient did not have documented episodes of A. fib on EKG    Physical Exam:-  Vitals:   Patient Vitals for the past 24 hrs:   BP Temp Temp src Pulse Resp SpO2 Height Weight   11/06/20 0835 (!) 142/86 98.7 °F (37.1 °C) Oral 84 17 96 % -- --   11/06/20 0225 -- -- -- -- 20 -- -- --   11/06/20 0400 112/72 98 °F (36.7 °C) Oral 84 14 94 % 5' 8\" (1.727 m) 276 lb 1.6 oz (125.2 kg)   11/06/20 0030 114/70 98.2 °F (36.8 °C) Oral 85 16 92 % -- --   11/06/20 0022 -- -- -- -- -- 94 % -- --   11/05/20 2015 135/78 98.5 °F (36.9 °C) Oral 88 18 97 % -- --   11/05/20 1532 131/85 98.7 °F (37.1 °C) Oral 95 20 98 % -- --     Weight:   Weight: 276 lb 1.6 oz (125.2 kg)   24 hour intake/output:     Intake/Output Summary (Last 24 hours) at 11/6/2020 1140  Last data filed at 11/6/2020 2241  Gross per 24 hour   Intake 2583 ml   Output 1100 ml   Net 1483 ml       General appearance:  Laying in bed on side, Able to answer questions appropriately, Obese  HEENT:  Normal cephalic, atraumatic without obvious deformity. Pupils equal, round, and reactive to light. Conjunctivae/corneas clear. Neck: Supple, with full range of motion. No jugular venous distention. Trachea midline. Respiratory:  BiPAP removed today, increased respiratory effort, coarse breath sounds, difficult to hear beyond noise from BiPAP machine. Cardiovascular:  Regular Rate and rhythm with normal S1/S2 without murmurs, rubs or gallops. Abdomen: Soft, non-tender, non-distended with normal bowel sounds. Musculoskeletal:  No clubbing or cyanosis, mild edema in lower limbs bilaterally. Full range of motion without deformity. Skin: Skin color, texture, turgor normal.  No rashes or lesions.   Dry skin around borders of face  Neurologic:  Limited due to inability to follow commands/stay awake  Capillary Refill: Brisk,< 3 seconds   Peripheral Pulses: +2 palpable, equal bilaterally       Discharge Medications:-      Medication List      START taking these medications    albuterol sulfate  (90 Base) MCG/ACT inhaler  Commonly known as:  Proventil HFA  Inhale 2 puffs into the lungs every 6 hours as needed for Wheezing     ferrous sulfate 325 (65 Fe) MG tablet  Commonly known as:  IRON 325  Take 1 tablet by mouth 2 times daily (with meals)     glycopyrrolate-formoterol 9-4.8 MCG/ACT Aero  Commonly known as:  BEVESPI  Inhale 2 puffs into the lungs 2 times daily     modafinil 100 MG tablet  Commonly known as:  PROVIGIL  Take 1 tablet by mouth daily for 30 days. TAKE IN THE MORNING. Start taking on:  November 7, 2020        CHANGE how you take these medications    allopurinol 300 MG tablet  Commonly known as:  ZYLOPRIM  Take 1 tablet by mouth daily  What changed:    · how much to take  · Another medication with the same name was removed. Continue taking this medication, and follow the directions you see here.     gabapentin 400 MG capsule  Commonly known as:  NEURONTIN  Take 1 capsule by mouth 2 times daily for 180 days. What changed:  how much to take        CONTINUE taking these medications    ABILIFY PO     acetaminophen 325 MG tablet  Commonly known as:  TYLENOL     Aspercreme 10 % Lotn  Generic drug:  Trolamine Salicylate  Apply topically as needed for Pain     aspirin 81 MG chewable tablet  Take 1 tablet by mouth daily     atorvastatin 40 MG tablet  Commonly known as:  LIPITOR     benzocaine 7.5 % oral gel  Commonly known as:  BABY ORAJEL     busPIRone 10 MG tablet  Commonly known as:  BUSPAR     CELEXA PO     CHOLECALCIFEROL PO     folic acid 1 MG tablet  Commonly known as:  [address] [place] [address]  Patient needs all supplies for qd testing.  DX: 250.00     furosemide 40 MG tablet  Commonly known as:  LASIX     guaiFENesin 100 MG/5ML syrup  Commonly known as:  ROBITUSSIN     hydrALAZINE 50 MG tablet  Commonly known as:  APRESOLINE     insulin glargine 100 UNIT/ML injection vial  Commonly known as:  LANTUS     insulin lispro 100 UNIT/ML injection vial  Commonly known as:  HUMALOG     Janumet  MG per tablet  Generic drug:  sitaGLIPtan-metformin     lidocaine 5 % ointment  Commonly known as:  XYLOCAINE  Apply topically as needed to painful areas on lower back, hips, knees, and feet     MAXORB EX     ondansetron 4 MG tablet  Commonly known as:  ZOFRAN     pantoprazole 40 MG tablet  Commonly known as:  PROTONIX  Take 1 tablet by mouth daily     Pinxav Oint     polyethylene glycol 17 GM/SCOOP powder  Commonly known as:  GLYCOLAX     SOBIA-BID PROBIOTIC PO     senna 8.6 MG tablet  Commonly known as:  SENOKOT     tamsulosin 0.4 MG capsule  Commonly known as:  FLOMAX     therapeutic multivitamin-minerals tablet     vitamin C 500 MG tablet  Commonly known as:  ASCORBIC ACID  Take 2 tablets by mouth daily        STOP taking these medications    amLODIPine 10 MG tablet  Commonly known as:  NORVASC     dilTIAZem 60 MG tablet  Commonly known as:  CARDIZEM     enoxaparin 120 MG/0.8ML injection  Commonly known as:  LOVENOX     esomeprazole Magnesium 20 MG Pack  Commonly known as:  NEXIUM     Percocet 5-325 MG per tablet  Generic drug:  oxyCODONE-acetaminophen     Valium 5 MG tablet  Generic drug:  diazePAM           Where to Get Your Medications      These medications were sent to [facility] [phone] [facility] [phone]  [address] [place] [address]    Phone:  [phone]   · albuterol sulfate  (90 Base) MCG/ACT inhaler  · ferrous sulfate 325 (65 Fe) MG tablet  · gabapentin 400 MG capsule  · glycopyrrolate-formoterol 9-4.8 MCG/ACT Aero     You can get these medications from any pharmacy    Bring a paper prescription for each of these medications  · modafinil 100 MG tablet          Labs :-  Recent Results (from the past 72 hour(s))   POCT Glucose    Collection Time: 11/03/20  6:30 PM   Result Value Ref Range    POC Glucose 122 (H) 70 - 108 mg/dl   Creatinine, Random Urine    Collection Time: 11/03/20  6:40 PM   Result Value Ref Range    Creatinine, Urine 173.1 mg/dl   Electrolytes urine random    Collection Time: 11/03/20  6:40 PM   Result Value Ref Range    Sodium, Ur 82 meq/l    Potassium, Urine 61.7 meq/l    Chloride, Urine 49.0 meq/l   Urine with Reflexed Micro Collection Time: 11/03/20  6:40 PM   Result Value Ref Range    Glucose, Ur NEGATIVE NEGATIVE mg/dl    Bilirubin Urine NEGATIVE NEGATIVE    Ketones, Urine NEGATIVE NEGATIVE    Specific Gravity, Urine 1.019 1.002 - 1.030    Blood, Urine NEGATIVE NEGATIVE    pH, UA 5.0 5.0 - 9.0    Protein, UA 30 (A) NEGATIVE    Urobilinogen, Urine 0.2 0.0 - 1.0 eu/dl    Nitrite, Urine NEGATIVE NEGATIVE    Leukocyte Esterase, Urine SMALL (A) NEGATIVE    Color, UA YELLOW STRAW-YELLOW    Character, Urine CLOUDY (A) CLEAR-SL CLOUD    RBC, UA 3-5 0-2/hpf /hpf    WBC, UA 25-50 0-4/hpf /hpf    Epithelial Cells, UA 5-10 3-5/hpf /hpf    Bacteria, UA FEW FEW/NONE SEEN /hpf    Casts UA 8-15 HYALINE NONE SEEN /lpf    Crystals, UA NONE SEEN NONE SEEN    Renal Epithelial, UA NONE NONE SEEN    Yeast, UA NONE SEEN NONE SEEN    CASTS 2 NONE SEEN NONE SEEN /lpf    MISCELLANEOUS 2 NONE SEEN    Culture, Reflexed, Urine    Collection Time: 11/03/20  6:40 PM    Specimen: Urine, catheter   Result Value Ref Range    Urine Culture Reflex No growth-preliminary No growth     POCT glucose    Collection Time: 11/03/20  7:40 PM   Result Value Ref Range    POC Glucose 131 (H) 70 - 108 mg/dl   POCT Glucose    Collection Time: 11/03/20  8:36 PM   Result Value Ref Range    POC Glucose 115 (H) 70 - 108 mg/dl   POCT glucose    Collection Time: 11/03/20 11:51 PM   Result Value Ref Range    POC Glucose 114 (H) 70 - 108 mg/dl   CBC auto differential    Collection Time: 11/04/20  5:40 AM   Result Value Ref Range    WBC 8.4 4.8 - 10.8 thou/mm3    RBC 3.18 (L) 4.70 - 6.10 mill/mm3    Hemoglobin 9.5 (L) 14.0 - 18.0 gm/dl    Hematocrit 30.8 (L) 42.0 - 52.0 %    MCV 96.9 (H) 80.0 - 94.0 fL    MCH 29.9 26.0 - 33.0 pg    MCHC 30.8 (L) 32.2 - 35.5 gm/dl    RDW-CV 16.7 (H) 11.5 - 14.5 %    RDW-SD 59.2 (H) 35.0 - 45.0 fL    Platelets 467 170 - 446 thou/mm3    MPV 10.5 9.4 - 12.4 fL    Seg Neutrophils 68.3 %    Lymphocytes 14.7 %    Monocytes 9.8 %    Eosinophils 5.9 %    Basophils 11.5 - 14.5 %    RDW-SD 60.7 (H) 35.0 - 45.0 fL    Platelets 022 759 - 279 thou/mm3    MPV 10.8 9.4 - 12.4 fL    Seg Neutrophils 62.0 %    Lymphocytes 17.7 %    Monocytes 11.1 %    Eosinophils 7.8 %    Basophils 0.9 %    Immature Granulocytes 0.5 %    Segs Absolute 5.0 1.8 - 7.7 thou/mm3    Lymphocytes Absolute 1.4 1.0 - 4.8 thou/mm3    Monocytes Absolute 0.9 0.4 - 1.3 thou/mm3    Eosinophils Absolute 0.6 (H) 0.0 - 0.4 thou/mm3    Basophils Absolute 0.1 0.0 - 0.1 thou/mm3    Immature Grans (Abs) 0.04 0.00 - 0.07 thou/mm3    nRBC 0 /100 wbc   Basic Metabolic Panel    Collection Time: 11/05/20  5:44 AM   Result Value Ref Range    Sodium 144 135 - 145 meq/L    Potassium 3.5 3.5 - 5.2 meq/L    Chloride 105 98 - 111 meq/L    CO2 24 23 - 33 meq/L    Glucose 102 70 - 108 mg/dL    BUN 7 7 - 22 mg/dL    CREATININE 1.7 (H) 0.4 - 1.2 mg/dL    Calcium 10.2 8.5 - 10.5 mg/dL   Anion Gap    Collection Time: 11/05/20  5:44 AM   Result Value Ref Range    Anion Gap 15.0 8.0 - 16.0 meq/L   Glomerular Filtration Rate, Estimated    Collection Time: 11/05/20  5:44 AM   Result Value Ref Range    Est, Glom Filt Rate 51 (A) ml/min/1.73m2   COVID-19    Collection Time: 11/05/20 10:31 AM   Result Value Ref Range    SARS-CoV-2, PCR NOT DETECTED NOT DETECTED   POCT glucose    Collection Time: 11/05/20 11:15 AM   Result Value Ref Range    POC Glucose 101 70 - 108 mg/dl   POCT glucose    Collection Time: 11/05/20  5:16 PM   Result Value Ref Range    POC Glucose 105 70 - 108 mg/dl   POCT glucose    Collection Time: 11/06/20  1:22 AM   Result Value Ref Range    POC Glucose 90 70 - 108 mg/dl   POCT glucose    Collection Time: 11/06/20  5:38 AM   Result Value Ref Range    POC Glucose 87 70 - 108 mg/dl   CBC auto differential    Collection Time: 11/06/20  5:43 AM   Result Value Ref Range    WBC 7.4 4.8 - 10.8 thou/mm3    RBC 3.01 (L) 4.70 - 6.10 mill/mm3    Hemoglobin 8.8 (L) 14.0 - 18.0 gm/dl    Hematocrit 29.3 (L) 42.0 - 52.0 %    MCV 97.3 (H) 80.0 - 94.0 fL    MCH 29.2 26.0 - 33.0 pg    MCHC 30.0 (L) 32.2 - 35.5 gm/dl    RDW-CV 16.5 (H) 11.5 - 14.5 %    RDW-SD 59.1 (H) 35.0 - 45.0 fL    Platelets 001 530 - 628 thou/mm3    MPV 10.7 9.4 - 12.4 fL    Seg Neutrophils 63.1 %    Lymphocytes 17.9 %    Monocytes 9.7 %    Eosinophils 8.1 %    Basophils 0.8 %    Immature Granulocytes 0.4 %    Segs Absolute 4.7 1.8 - 7.7 thou/mm3    Lymphocytes Absolute 1.3 1.0 - 4.8 thou/mm3    Monocytes Absolute 0.7 0.4 - 1.3 thou/mm3    Eosinophils Absolute 0.6 (H) 0.0 - 0.4 thou/mm3    Basophils Absolute 0.1 0.0 - 0.1 thou/mm3    Immature Grans (Abs) 0.03 0.00 - 0.07 thou/mm3    nRBC 0 /100 wbc   Basic Metabolic Panel    Collection Time: 11/06/20  5:43 AM   Result Value Ref Range    Sodium 143 135 - 145 meq/L    Potassium 3.6 3.5 - 5.2 meq/L    Chloride 106 98 - 111 meq/L    CO2 25 23 - 33 meq/L    Glucose 90 70 - 108 mg/dL    BUN 5 (L) 7 - 22 mg/dL    CREATININE 1.6 (H) 0.4 - 1.2 mg/dL    Calcium 9.8 8.5 - 10.5 mg/dL   Anion Gap    Collection Time: 11/06/20  5:43 AM   Result Value Ref Range    Anion Gap 12.0 8.0 - 16.0 meq/L   Glomerular Filtration Rate, Estimated    Collection Time: 11/06/20  5:43 AM   Result Value Ref Range    Est, Glom Filt Rate 55 (A) ml/min/1.73m2   POCT glucose    Collection Time: 11/06/20  8:08 AM   Result Value Ref Range    POC Glucose 91 70 - 108 mg/dl        Microbiology:    Blood culture #1:   Lab Results   Component Value Date    BC No growth-preliminary No growth  10/28/2020       Blood culture #2:No results found for: BLOODCULT2    Organism:    Lab Results   Component Value Date    LABGRAM  10/12/2020     Moderate segmented neutrophils observed. Rare epithelial cells observed. Rare gram positive cocci occurring singly and in pairs.          MRSA culture only:No results found for: [facility]    Urine culture:   Lab Results   Component Value Date    [place]  10/06/2020     At least one of drugs in current antibiotic therapy is ineffective in vitro for isolate. LABURIN [place] count: >100,000 CFU/mL   10/06/2020     Lab Results   Component Value Date    ORG Staphylococcus (coagulase negative) 10/22/2020        Respiratory culture: No results found for: CULTRESP    Aerobic and Anaerobic :  No results found for: LABAERO  No results found for: LABANAE    Urinalysis:      Lab Results   Component Value Date    NITRU NEGATIVE 11/03/2020    WBCUA 25-50 11/03/2020    BACTERIA FEW 11/03/2020    RBCUA 3-5 11/03/2020    BLOODU NEGATIVE 11/03/2020    SPECGRAV >=1.030 10/06/2020    GLUCOSEU NEGATIVE 11/03/2020       Radiology:-  Xr Chest Portable    Result Date: 9/24/2020  PROCEDURE: XR CHEST PORTABLE CLINICAL INFORMATION: Left internal jugular central venous catheter placement TECHNIQUE: Mobile AP chest radiograph. COMPARISON: Mobile AP chest radiograph 9/23/2020 FINDINGS: The tip of a new left-sided internal jugular central venous line overlies the superior vena cava. Endotracheal tube, nasogastric tube and esophageal probe are in stable position. Cardiomediastinal silhouette is within normal limits. Lung volumes are low. Alveolar opacities throughout the bilateral lungs are more extensive than the prior study. Degenerative changes in the thoracic spine are poorly visualized. 1. Lines and tubes as above. 2. Worsening bilateral pneumonia. **This report has been created using voice recognition software. It may contain minor errors which are inherent in voice recognition technology. ** Final report electronically signed by Dr. Ligia Hernandez on 9/24/2020 7:38 AM    Xr Chest Portable    Result Date: 9/23/2020  XR CHEST PORTABLE Exam Date and Exam Time: 09/23/2020 11:42 PM Accession: [id number] Reason for exam: ett/ng Ordering Diagnosis: Acute respiratory failure with hypoxemia ([facility] [address] and Acute respiratory failure with hypoxia ([address] 1 view chest x-ray Comparison:  DX  - CHEST PA /T/ LAT  - 11/09/2011 11:43 PM EST Findings: Endotracheal tube terminates 3.1 cm above the

## 2020-11-06 NOTE — PROGRESS NOTES
[facility]    Renal Inpatient Follow-up note  11/6/2020 8:24 AM       Pt Name:   Timo Gracia  YOB: 1970  Attending:   Santa Tena MD     Chief Complaint : Timo Gracia is a 44 y.o. male beingfollowed by nephrology for Acute Kidney Injury     Hospital Course:   Georgette Norman is a 28-year-old male never smoker with a past medical history significant for DURAN KIARA, HLD, HTN, HFpEF, GERD, DM 2, gout, CAD, BPH, atrial fibrillation on chronic anticoagulation who presented to [facility] on 9/23/2020 for worsening shortness of breath.  Patient was known to have had previous hospitalization on 9/6/2020 through 9/15/2020 for hypoxemic respiratory failure secondary to COVID-19 pneumonia.  Patient was noted to have completed a course of Decadron, remdesivir, and danazol and was discharged home on subcutaneous Lovenox.  Patient quickly deteriorated requiring intubation and underwent subsequent bronchoscopy on 9/27/2020 which demonstrated no mucus production.  Patient was extubated on 10/2/2020 and reintubated for progressive respiratory failure and obtundation on 10/6/2020. Kingsley Cortez was noted to have acute oliguric renal failure secondary to prerenal etiology with suspicion for sepsis.  Patient underwent volume resuscitation but urine output did not improve.  Patient underwent empiric treatment with Zosyn and doxycycline and sputum was shown to have grown staph aureus.  Patient was extubated on 10/15/2020 following his trial spontaneous breathing. Interval History : Patient seen and examined at bedside this morning. Patient reports that he is having burning around his catheter site. Catheter was placed due to urinary retention in the context of KRISTY, which is now improved and appears to be near baseline. Patient to follow-up with urology in 2 weeks and Dr. Luciano Fiore in the clinic in 4 weeks with [facility] prior to appointment.     Apart from ongoing neuropathic pain in patient's legs patient has no other complaints at this time. Denies any chest pain, shortness of breath, abdominal pain, diarrhea, headache, nausea and Vomiting     Scheduled Medications :   potassium bicarb-citric acid  40 mEq Oral BID    tamsulosin  0.8 mg Oral Nightly    darbepoetin alejandro-polysorbate  60 mcg Subcutaneous Weekly - Thursday    pantoprazole  40 mg Intravenous BID    sodium chloride flush  10 mL Intravenous 2 times per day    ferrous sulfate  325 mg Oral BID WC    insulin lispro  0-12 Units Subcutaneous Q6H    gabapentin  400 mg Oral BID    modafinil  100 mg Oral Daily    [Held by provider] enoxaparin  40 mg Subcutaneous BID    [Held by provider] insulin glargine  38 Units Subcutaneous Nightly    lidocaine 1 % injection  5 mL Intradermal Once    magnesium replacement protocol   Other RX Placeholder    phosphorus replacement protocol   Other RX Placeholder    calcium replacement protocol   Other RX Placeholder    [Held by provider] ARIPiprazole  15 mg Oral Daily    [Held by provider] aspirin  81 mg Oral Daily    glycopyrrolate-formoterol  2 puff Inhalation BID      dextrose         Vitals :  /72   Pulse 84   Temp 98 °F (36.7 °C) (Oral)   Resp 20   Ht 5' 8\" (1.727 m)   Wt 276 lb 1.6 oz (125.2 kg)   SpO2 94%   BMI 41.98 kg/m²     24HR INTAKE/OUTPUT:      Intake/Output Summary (Last 24 hours) at 11/6/2020 0824  Last data filed at 11/6/2020 0400  Gross per 24 hour   Intake 2573 ml   Output 1100 ml   Net 1473 ml        Physical Exam :  General appearance: Patient is a middle-aged obese male lying in bed in no acute distress  HEENT: Pupils equal, round, and reactive to light. Conjunctivae/corneas clear. Neck: No jugular venous distention. Trachea midline. Respiratory:  Normal respiratory effort. Clear to auscultation, bilaterally without Rales/Wheezes/Rhonchi.   Cardiovascular: Regular rate and rhythm with normal S1/S2 without murmurs, rubs or gallops. Abdomen: Protuberant, soft, non-tender, non-distended with normal bowel sounds. Musculoskeletal: passive and active ROM x 4 extremities. Skin: Skin color, texture, turgor normal.  No rashes or lesions. Neurologic:  Neurovascularly intact without any focal sensory/motor deficits. Grossly non-focal.  Psychiatric: Alert and oriented, thought content appropriate, normal insight  Capillary Refill: Brisk,< 3 seconds   Peripheral Pulses: +2 palpable, equal bilaterally      Last 3 CBC  Recent Labs     11/04/20  0540 11/05/20  0544 11/06/20  0543   WBC 8.4 8.1 7.4   RBC 3.18* 3.31* 3.01*   HGB 9.5* 9.7* 8.8*   HCT 30.8* 32.8* 29.3*   MCV 96.9* 99.1* 97.3*    369 355     Last 3 CMP  Recent Labs     11/04/20 0540 11/05/20  0544 11/06/20  0543    144 143   K 3.7 3.5 3.6    105 106   CO2 26 24 25   BUN 9 7 5*   CREATININE 1.7* 1.7* 1.6*   CALCIUM 10.2 10.2 9.8        Assessment / Plan :    1. KRISTY on CKD (improving): Nephrology reconsulted for worsening KRISTY.  Originally thought to be prerenal in the context of sepsis pneumonia. Suspect some element of post obstructive uropathy as creatinine improved after mason restarted. Patient also noted to be on 10+ days Vancomycin and Zosyn. Now discontinued. Flomax resumed, patient now appears to be near baseline  2. Urinary retention: Secondary to BPH. Patient has had intermittent urinary retention during admission requiring catheterization and currently has Mason placed. Flomax has been resumed. Patient to follow-up with urology in 2 weeks following discharge. 3. Acute normocytic Anemia: due to rectal bleeding in the context of rectal ulcer. Hemoglobin stable and rising.  H&H 9.5/31.2 today  4. HFpEF: Patient has been receiving intermittent diuresis and is on home Lasix  5. Dysphagia: Patient was originally on TPN on 10/21/2020 as patient had failed swallow study following extubation.  Patient was not a good candidate for PEG due to BiPAP therapy.

## 2020-11-07 ENCOUNTER — APPOINTMENT (OUTPATIENT)
Dept: CT IMAGING | Age: 50
DRG: 919 | End: 2020-11-07
Payer: MEDICARE

## 2020-11-07 ENCOUNTER — HOSPITAL ENCOUNTER (INPATIENT)
Age: 50
LOS: 12 days | Discharge: NURSING, REHAB OR LONG-TERM CARE | DRG: 919 | End: 2020-11-19
Attending: EMERGENCY MEDICINE | Admitting: FAMILY MEDICINE
Payer: MEDICARE

## 2020-11-07 PROBLEM — U07.1 PNEUMONIA DUE TO COVID-19 VIRUS: Status: RESOLVED | Noted: 2020-10-18 | Resolved: 2020-11-07

## 2020-11-07 PROBLEM — J12.82 PNEUMONIA DUE TO COVID-19 VIRUS: Status: RESOLVED | Noted: 2020-10-18 | Resolved: 2020-11-07

## 2020-11-07 PROBLEM — R07.89 CHEST WALL PAIN: Status: RESOLVED | Noted: 2020-05-22 | Resolved: 2020-11-07

## 2020-11-07 PROBLEM — U07.1 COVID-19: Status: RESOLVED | Noted: 2020-08-05 | Resolved: 2020-11-07

## 2020-11-07 PROBLEM — U07.1 COVID-19 VIRUS INFECTION: Status: RESOLVED | Noted: 2020-09-06 | Resolved: 2020-11-07

## 2020-11-07 PROBLEM — I50.32 CHRONIC DIASTOLIC CONGESTIVE HEART FAILURE (HCC): Status: RESOLVED | Noted: 2019-09-30 | Resolved: 2020-11-07

## 2020-11-07 PROBLEM — K61.1 PERIRECTAL ABSCESS: Status: ACTIVE | Noted: 2020-11-07

## 2020-11-07 LAB
ABO: NORMAL
ALBUMIN SERPL-MCNC: 3.2 G/DL (ref 3.5–5.1)
ALP BLD-CCNC: 91 U/L (ref 38–126)
ALT SERPL-CCNC: 13 U/L (ref 11–66)
ANION GAP SERPL CALCULATED.3IONS-SCNC: 13 MEQ/L (ref 8–16)
ANTIBODY SCREEN: NORMAL
APTT: 34 SECONDS (ref 22–38)
AST SERPL-CCNC: 21 U/L (ref 5–40)
BASOPHILS # BLD: 0.6 %
BASOPHILS ABSOLUTE: 0.1 THOU/MM3 (ref 0–0.1)
BILIRUB SERPL-MCNC: 0.5 MG/DL (ref 0.3–1.2)
BUN BLDV-MCNC: 3 MG/DL (ref 7–22)
CALCIUM SERPL-MCNC: 9.9 MG/DL (ref 8.5–10.5)
CHLORIDE BLD-SCNC: 106 MEQ/L (ref 98–111)
CO2: 26 MEQ/L (ref 23–33)
CREAT SERPL-MCNC: 1.2 MG/DL (ref 0.4–1.2)
EKG ATRIAL RATE: 87 BPM
EKG P AXIS: 52 DEGREES
EKG P-R INTERVAL: 146 MS
EKG Q-T INTERVAL: 396 MS
EKG QRS DURATION: 82 MS
EKG QTC CALCULATION (BAZETT): 476 MS
EKG R AXIS: -20 DEGREES
EKG T AXIS: -19 DEGREES
EKG VENTRICULAR RATE: 87 BPM
EOSINOPHIL # BLD: 7.2 %
EOSINOPHILS ABSOLUTE: 0.7 THOU/MM3 (ref 0–0.4)
ERYTHROCYTE [DISTWIDTH] IN BLOOD BY AUTOMATED COUNT: 16.4 % (ref 11.5–14.5)
ERYTHROCYTE [DISTWIDTH] IN BLOOD BY AUTOMATED COUNT: 57.9 FL (ref 35–45)
GFR SERPL CREATININE-BSD FRML MDRD: 77 ML/MIN/1.73M2
GLUCOSE BLD-MCNC: 89 MG/DL (ref 70–108)
HCT VFR BLD CALC: 31 % (ref 42–52)
HEMOCCULT STL QL: POSITIVE
HEMOGLOBIN: 9.5 GM/DL (ref 14–18)
IMMATURE GRANS (ABS): 0.04 THOU/MM3 (ref 0–0.07)
IMMATURE GRANULOCYTES: 0.4 %
INR BLD: 1.14 (ref 0.85–1.13)
LYMPHOCYTES # BLD: 14 %
LYMPHOCYTES ABSOLUTE: 1.4 THOU/MM3 (ref 1–4.8)
MAGNESIUM: 1.5 MG/DL (ref 1.6–2.4)
MCH RBC QN AUTO: 29.5 PG (ref 26–33)
MCHC RBC AUTO-ENTMCNC: 30.6 GM/DL (ref 32.2–35.5)
MCV RBC AUTO: 96.3 FL (ref 80–94)
MONOCYTES # BLD: 8.3 %
MONOCYTES ABSOLUTE: 0.8 THOU/MM3 (ref 0.4–1.3)
NUCLEATED RED BLOOD CELLS: 0 /100 WBC
OSMOLALITY CALCULATION: 284.7 MOSMOL/KG (ref 275–300)
PLATELET # BLD: 369 THOU/MM3 (ref 130–400)
PMV BLD AUTO: 10.4 FL (ref 9.4–12.4)
POTASSIUM REFLEX MAGNESIUM: 3.2 MEQ/L (ref 3.5–5.2)
RBC # BLD: 3.22 MILL/MM3 (ref 4.7–6.1)
RH FACTOR: NORMAL
SEG NEUTROPHILS: 69.5 %
SEGMENTED NEUTROPHILS ABSOLUTE COUNT: 6.7 THOU/MM3 (ref 1.8–7.7)
SODIUM BLD-SCNC: 145 MEQ/L (ref 135–145)
TOTAL PROTEIN: 6.4 G/DL (ref 6.1–8)
WBC # BLD: 9.7 THOU/MM3 (ref 4.8–10.8)

## 2020-11-07 PROCEDURE — 99285 EMERGENCY DEPT VISIT HI MDM: CPT

## 2020-11-07 PROCEDURE — 99222 1ST HOSP IP/OBS MODERATE 55: CPT | Performed by: FAMILY MEDICINE

## 2020-11-07 PROCEDURE — 2580000003 HC RX 258: Performed by: NURSE PRACTITIONER

## 2020-11-07 PROCEDURE — 86850 RBC ANTIBODY SCREEN: CPT

## 2020-11-07 PROCEDURE — 93005 ELECTROCARDIOGRAM TRACING: CPT | Performed by: NURSE PRACTITIONER

## 2020-11-07 PROCEDURE — 80053 COMPREHEN METABOLIC PANEL: CPT

## 2020-11-07 PROCEDURE — 82272 OCCULT BLD FECES 1-3 TESTS: CPT

## 2020-11-07 PROCEDURE — 6360000002 HC RX W HCPCS: Performed by: NURSE PRACTITIONER

## 2020-11-07 PROCEDURE — 85730 THROMBOPLASTIN TIME PARTIAL: CPT

## 2020-11-07 PROCEDURE — 85025 COMPLETE CBC W/AUTO DIFF WBC: CPT

## 2020-11-07 PROCEDURE — 2500000003 HC RX 250 WO HCPCS: Performed by: NURSE PRACTITIONER

## 2020-11-07 PROCEDURE — 74177 CT ABD & PELVIS W/CONTRAST: CPT

## 2020-11-07 PROCEDURE — 6360000004 HC RX CONTRAST MEDICATION: Performed by: EMERGENCY MEDICINE

## 2020-11-07 PROCEDURE — 1200000003 HC TELEMETRY R&B

## 2020-11-07 PROCEDURE — 83735 ASSAY OF MAGNESIUM: CPT

## 2020-11-07 PROCEDURE — 85610 PROTHROMBIN TIME: CPT

## 2020-11-07 PROCEDURE — 93010 ELECTROCARDIOGRAM REPORT: CPT | Performed by: INTERNAL MEDICINE

## 2020-11-07 PROCEDURE — 86901 BLOOD TYPING SEROLOGIC RH(D): CPT

## 2020-11-07 PROCEDURE — 86900 BLOOD TYPING SEROLOGIC ABO: CPT

## 2020-11-07 PROCEDURE — 36415 COLL VENOUS BLD VENIPUNCTURE: CPT

## 2020-11-07 RX ADMIN — IOPAMIDOL 80 ML: 755 INJECTION, SOLUTION INTRAVENOUS at 19:50

## 2020-11-07 RX ADMIN — METRONIDAZOLE 500 MG: 500 INJECTION, SOLUTION INTRAVENOUS at 23:06

## 2020-11-07 RX ADMIN — CEFTRIAXONE SODIUM 1 G: 1 INJECTION, POWDER, FOR SOLUTION INTRAMUSCULAR; INTRAVENOUS at 21:55

## 2020-11-07 ASSESSMENT — PAIN DESCRIPTION - LOCATION: LOCATION: RECTUM

## 2020-11-07 ASSESSMENT — PAIN DESCRIPTION - PAIN TYPE: TYPE: ACUTE PAIN

## 2020-11-07 ASSESSMENT — PAIN SCALES - GENERAL: PAINLEVEL_OUTOF10: 8

## 2020-11-07 NOTE — ED TRIAGE NOTES
Pt presents to ER via squad from [facility] with rectal bleeding that started today. Pt states he is also having abdominal pain and rectal pain. Pt denies any blood thinner use. Chronic mason in place. Pt reports having shortness of breath and a cough.

## 2020-11-07 NOTE — ED NOTES
Dr Paula Sweeney in room assessing pt's rectum for complaints of rectal bleeding. Pt has several sores with red drainage noted. Pt repositioned with pillow under right side to alleviate pressure.      Paola Timmons RN  11/07/20 3594

## 2020-11-07 NOTE — ED NOTES
Reassessment of the patients Rectal Bleeding   is unchanged, the patients pain reassessment is a 0/10, Side rails up times 2, call light in reach, will continue to monitor.        Martín Nicolas RN  11/07/20 0695

## 2020-11-08 LAB
ALBUMIN SERPL-MCNC: 3.1 G/DL (ref 3.5–5.1)
ALP BLD-CCNC: 94 U/L (ref 38–126)
ALT SERPL-CCNC: 14 U/L (ref 11–66)
ANION GAP SERPL CALCULATED.3IONS-SCNC: 13 MEQ/L (ref 8–16)
AST SERPL-CCNC: 22 U/L (ref 5–40)
BASOPHILS # BLD: 0.6 %
BASOPHILS ABSOLUTE: 0.1 THOU/MM3 (ref 0–0.1)
BILIRUB SERPL-MCNC: 0.5 MG/DL (ref 0.3–1.2)
BUN BLDV-MCNC: 3 MG/DL (ref 7–22)
CALCIUM SERPL-MCNC: 9.1 MG/DL (ref 8.5–10.5)
CHLORIDE BLD-SCNC: 104 MEQ/L (ref 98–111)
CO2: 25 MEQ/L (ref 23–33)
CREAT SERPL-MCNC: 1.2 MG/DL (ref 0.4–1.2)
EOSINOPHIL # BLD: 6.8 %
EOSINOPHILS ABSOLUTE: 0.6 THOU/MM3 (ref 0–0.4)
ERYTHROCYTE [DISTWIDTH] IN BLOOD BY AUTOMATED COUNT: 16.5 % (ref 11.5–14.5)
ERYTHROCYTE [DISTWIDTH] IN BLOOD BY AUTOMATED COUNT: 58.1 FL (ref 35–45)
GFR SERPL CREATININE-BSD FRML MDRD: 77 ML/MIN/1.73M2
GLUCOSE BLD-MCNC: 100 MG/DL (ref 70–108)
GLUCOSE BLD-MCNC: 84 MG/DL (ref 70–108)
GLUCOSE BLD-MCNC: 86 MG/DL (ref 70–108)
GLUCOSE BLD-MCNC: 90 MG/DL (ref 70–108)
GLUCOSE BLD-MCNC: 93 MG/DL (ref 70–108)
GLUCOSE BLD-MCNC: 95 MG/DL (ref 70–108)
HCT VFR BLD CALC: 31.5 % (ref 42–52)
HEMOGLOBIN: 9.7 GM/DL (ref 14–18)
IMMATURE GRANS (ABS): 0.03 THOU/MM3 (ref 0–0.07)
IMMATURE GRANULOCYTES: 0.3 %
LACTIC ACID: 1 MMOL/L (ref 0.5–2.2)
LYMPHOCYTES # BLD: 8.7 %
LYMPHOCYTES ABSOLUTE: 0.8 THOU/MM3 (ref 1–4.8)
MAGNESIUM: 1.5 MG/DL (ref 1.6–2.4)
MCH RBC QN AUTO: 29.7 PG (ref 26–33)
MCHC RBC AUTO-ENTMCNC: 30.8 GM/DL (ref 32.2–35.5)
MCV RBC AUTO: 96.3 FL (ref 80–94)
MONOCYTES # BLD: 8.6 %
MONOCYTES ABSOLUTE: 0.8 THOU/MM3 (ref 0.4–1.3)
NUCLEATED RED BLOOD CELLS: 0 /100 WBC
PLATELET # BLD: 357 THOU/MM3 (ref 130–400)
PMV BLD AUTO: 10.2 FL (ref 9.4–12.4)
POTASSIUM REFLEX MAGNESIUM: 3.1 MEQ/L (ref 3.5–5.2)
RBC # BLD: 3.27 MILL/MM3 (ref 4.7–6.1)
SEG NEUTROPHILS: 75 %
SEGMENTED NEUTROPHILS ABSOLUTE COUNT: 6.6 THOU/MM3 (ref 1.8–7.7)
SODIUM BLD-SCNC: 142 MEQ/L (ref 135–145)
TOTAL PROTEIN: 6.1 G/DL (ref 6.1–8)
WBC # BLD: 8.8 THOU/MM3 (ref 4.8–10.8)

## 2020-11-08 PROCEDURE — 85025 COMPLETE CBC W/AUTO DIFF WBC: CPT

## 2020-11-08 PROCEDURE — 87045 FECES CULTURE AEROBIC BACT: CPT

## 2020-11-08 PROCEDURE — 94660 CPAP INITIATION&MGMT: CPT

## 2020-11-08 PROCEDURE — 94640 AIRWAY INHALATION TREATMENT: CPT

## 2020-11-08 PROCEDURE — 99232 SBSQ HOSP IP/OBS MODERATE 35: CPT | Performed by: NURSE PRACTITIONER

## 2020-11-08 PROCEDURE — 82948 REAGENT STRIP/BLOOD GLUCOSE: CPT

## 2020-11-08 PROCEDURE — 87427 SHIGA-LIKE TOXIN AG IA: CPT

## 2020-11-08 PROCEDURE — 2580000003 HC RX 258: Performed by: FAMILY MEDICINE

## 2020-11-08 PROCEDURE — 1200000003 HC TELEMETRY R&B

## 2020-11-08 PROCEDURE — 2700000000 HC OXYGEN THERAPY PER DAY

## 2020-11-08 PROCEDURE — 80053 COMPREHEN METABOLIC PANEL: CPT

## 2020-11-08 PROCEDURE — 6360000002 HC RX W HCPCS: Performed by: FAMILY MEDICINE

## 2020-11-08 PROCEDURE — 6370000000 HC RX 637 (ALT 250 FOR IP): Performed by: FAMILY MEDICINE

## 2020-11-08 PROCEDURE — 94761 N-INVAS EAR/PLS OXIMETRY MLT: CPT

## 2020-11-08 PROCEDURE — 83605 ASSAY OF LACTIC ACID: CPT

## 2020-11-08 PROCEDURE — 99221 1ST HOSP IP/OBS SF/LOW 40: CPT | Performed by: SURGERY

## 2020-11-08 PROCEDURE — 36415 COLL VENOUS BLD VENIPUNCTURE: CPT

## 2020-11-08 PROCEDURE — 2500000003 HC RX 250 WO HCPCS: Performed by: FAMILY MEDICINE

## 2020-11-08 PROCEDURE — 83735 ASSAY OF MAGNESIUM: CPT

## 2020-11-08 RX ORDER — INSULIN GLARGINE 100 [IU]/ML
10 INJECTION, SOLUTION SUBCUTANEOUS NIGHTLY
Status: DISCONTINUED | OUTPATIENT
Start: 2020-11-08 | End: 2020-11-19 | Stop reason: HOSPADM

## 2020-11-08 RX ORDER — TAMSULOSIN HYDROCHLORIDE 0.4 MG/1
0.4 CAPSULE ORAL NIGHTLY
Status: DISCONTINUED | OUTPATIENT
Start: 2020-11-08 | End: 2020-11-19 | Stop reason: HOSPADM

## 2020-11-08 RX ORDER — ACETAMINOPHEN 325 MG/1
650 TABLET ORAL EVERY 6 HOURS PRN
Status: DISCONTINUED | OUTPATIENT
Start: 2020-11-08 | End: 2020-11-19 | Stop reason: HOSPADM

## 2020-11-08 RX ORDER — ALBUTEROL SULFATE 90 UG/1
2 AEROSOL, METERED RESPIRATORY (INHALATION) EVERY 6 HOURS PRN
Status: DISCONTINUED | OUTPATIENT
Start: 2020-11-08 | End: 2020-11-19 | Stop reason: HOSPADM

## 2020-11-08 RX ORDER — FUROSEMIDE 40 MG/1
40 TABLET ORAL DAILY
Status: DISCONTINUED | OUTPATIENT
Start: 2020-11-08 | End: 2020-11-19 | Stop reason: HOSPADM

## 2020-11-08 RX ORDER — MAGNESIUM SULFATE IN WATER 40 MG/ML
2 INJECTION, SOLUTION INTRAVENOUS PRN
Status: DISCONTINUED | OUTPATIENT
Start: 2020-11-08 | End: 2020-11-19 | Stop reason: HOSPADM

## 2020-11-08 RX ORDER — ATORVASTATIN CALCIUM 40 MG/1
40 TABLET, FILM COATED ORAL NIGHTLY
Status: DISCONTINUED | OUTPATIENT
Start: 2020-11-08 | End: 2020-11-19 | Stop reason: HOSPADM

## 2020-11-08 RX ORDER — SENNA PLUS 8.6 MG/1
1 TABLET ORAL 2 TIMES DAILY
Status: DISCONTINUED | OUTPATIENT
Start: 2020-11-08 | End: 2020-11-19 | Stop reason: HOSPADM

## 2020-11-08 RX ORDER — M-VIT,TX,IRON,MINS/CALC/FOLIC 27MG-0.4MG
1 TABLET ORAL DAILY
Status: DISCONTINUED | OUTPATIENT
Start: 2020-11-08 | End: 2020-11-19 | Stop reason: HOSPADM

## 2020-11-08 RX ORDER — ONDANSETRON 2 MG/ML
4 INJECTION INTRAMUSCULAR; INTRAVENOUS EVERY 6 HOURS PRN
Status: DISCONTINUED | OUTPATIENT
Start: 2020-11-08 | End: 2020-11-19 | Stop reason: HOSPADM

## 2020-11-08 RX ORDER — CITALOPRAM 20 MG/1
30 TABLET ORAL DAILY
Status: DISCONTINUED | OUTPATIENT
Start: 2020-11-08 | End: 2020-11-19 | Stop reason: HOSPADM

## 2020-11-08 RX ORDER — PROMETHAZINE HYDROCHLORIDE 25 MG/1
12.5 TABLET ORAL EVERY 6 HOURS PRN
Status: DISCONTINUED | OUTPATIENT
Start: 2020-11-08 | End: 2020-11-19 | Stop reason: HOSPADM

## 2020-11-08 RX ORDER — PANTOPRAZOLE SODIUM 40 MG/1
40 TABLET, DELAYED RELEASE ORAL DAILY
Status: DISCONTINUED | OUTPATIENT
Start: 2020-11-08 | End: 2020-11-19 | Stop reason: HOSPADM

## 2020-11-08 RX ORDER — GUAIFENESIN 100 MG/5ML
200 SOLUTION ORAL EVERY 4 HOURS PRN
Status: DISCONTINUED | OUTPATIENT
Start: 2020-11-08 | End: 2020-11-19 | Stop reason: HOSPADM

## 2020-11-08 RX ORDER — POLYETHYLENE GLYCOL 3350 17 G/17G
17 POWDER, FOR SOLUTION ORAL DAILY PRN
Status: DISCONTINUED | OUTPATIENT
Start: 2020-11-08 | End: 2020-11-19 | Stop reason: HOSPADM

## 2020-11-08 RX ORDER — POTASSIUM CHLORIDE 7.45 MG/ML
10 INJECTION INTRAVENOUS PRN
Status: DISCONTINUED | OUTPATIENT
Start: 2020-11-08 | End: 2020-11-19 | Stop reason: HOSPADM

## 2020-11-08 RX ORDER — GABAPENTIN 400 MG/1
400 CAPSULE ORAL 2 TIMES DAILY
Status: DISCONTINUED | OUTPATIENT
Start: 2020-11-08 | End: 2020-11-19 | Stop reason: HOSPADM

## 2020-11-08 RX ORDER — FOLIC ACID 1 MG/1
1 TABLET ORAL DAILY
Status: DISCONTINUED | OUTPATIENT
Start: 2020-11-08 | End: 2020-11-19 | Stop reason: HOSPADM

## 2020-11-08 RX ORDER — HYDRALAZINE HYDROCHLORIDE 50 MG/1
50 TABLET, FILM COATED ORAL 2 TIMES DAILY
Status: DISCONTINUED | OUTPATIENT
Start: 2020-11-08 | End: 2020-11-19 | Stop reason: HOSPADM

## 2020-11-08 RX ORDER — POTASSIUM CHLORIDE 20 MEQ/1
40 TABLET, EXTENDED RELEASE ORAL PRN
Status: DISCONTINUED | OUTPATIENT
Start: 2020-11-08 | End: 2020-11-19 | Stop reason: HOSPADM

## 2020-11-08 RX ORDER — POLYETHYLENE GLYCOL 3350 17 G/17G
17 POWDER, FOR SOLUTION ORAL EVERY OTHER DAY
Status: DISCONTINUED | OUTPATIENT
Start: 2020-11-09 | End: 2020-11-08 | Stop reason: CLARIF

## 2020-11-08 RX ORDER — SODIUM CHLORIDE 0.9 % (FLUSH) 0.9 %
10 SYRINGE (ML) INJECTION PRN
Status: DISCONTINUED | OUTPATIENT
Start: 2020-11-08 | End: 2020-11-19 | Stop reason: HOSPADM

## 2020-11-08 RX ORDER — ACETAMINOPHEN 650 MG/1
650 SUPPOSITORY RECTAL EVERY 6 HOURS PRN
Status: DISCONTINUED | OUTPATIENT
Start: 2020-11-08 | End: 2020-11-19 | Stop reason: HOSPADM

## 2020-11-08 RX ORDER — SODIUM CHLORIDE 9 MG/ML
INJECTION, SOLUTION INTRAVENOUS CONTINUOUS
Status: DISCONTINUED | OUTPATIENT
Start: 2020-11-08 | End: 2020-11-12

## 2020-11-08 RX ORDER — ARIPIPRAZOLE 15 MG/1
15 TABLET ORAL DAILY
Status: DISCONTINUED | OUTPATIENT
Start: 2020-11-08 | End: 2020-11-19 | Stop reason: HOSPADM

## 2020-11-08 RX ORDER — BUSPIRONE HYDROCHLORIDE 10 MG/1
10 TABLET ORAL 2 TIMES DAILY
Status: DISCONTINUED | OUTPATIENT
Start: 2020-11-08 | End: 2020-11-19 | Stop reason: HOSPADM

## 2020-11-08 RX ORDER — ONDANSETRON 4 MG/1
4 TABLET, FILM COATED ORAL EVERY 8 HOURS PRN
Status: DISCONTINUED | OUTPATIENT
Start: 2020-11-08 | End: 2020-11-19 | Stop reason: HOSPADM

## 2020-11-08 RX ORDER — NICOTINE POLACRILEX 4 MG
15 LOZENGE BUCCAL PRN
Status: DISCONTINUED | OUTPATIENT
Start: 2020-11-08 | End: 2020-11-19 | Stop reason: HOSPADM

## 2020-11-08 RX ORDER — SODIUM CHLORIDE 0.9 % (FLUSH) 0.9 %
10 SYRINGE (ML) INJECTION EVERY 12 HOURS SCHEDULED
Status: DISCONTINUED | OUTPATIENT
Start: 2020-11-08 | End: 2020-11-19 | Stop reason: HOSPADM

## 2020-11-08 RX ORDER — DEXTROSE MONOHYDRATE 25 G/50ML
12.5 INJECTION, SOLUTION INTRAVENOUS PRN
Status: DISCONTINUED | OUTPATIENT
Start: 2020-11-08 | End: 2020-11-19 | Stop reason: HOSPADM

## 2020-11-08 RX ORDER — FERROUS SULFATE 325(65) MG
325 TABLET ORAL 2 TIMES DAILY WITH MEALS
Status: DISCONTINUED | OUTPATIENT
Start: 2020-11-08 | End: 2020-11-19 | Stop reason: HOSPADM

## 2020-11-08 RX ORDER — MODAFINIL 100 MG/1
100 TABLET ORAL DAILY
Status: DISCONTINUED | OUTPATIENT
Start: 2020-11-08 | End: 2020-11-19 | Stop reason: HOSPADM

## 2020-11-08 RX ORDER — DEXTROSE MONOHYDRATE 50 MG/ML
100 INJECTION, SOLUTION INTRAVENOUS PRN
Status: DISCONTINUED | OUTPATIENT
Start: 2020-11-08 | End: 2020-11-19 | Stop reason: HOSPADM

## 2020-11-08 RX ORDER — POLYETHYLENE GLYCOL 3350 17 G/17G
17 POWDER, FOR SOLUTION ORAL EVERY OTHER DAY
Status: DISCONTINUED | OUTPATIENT
Start: 2020-11-09 | End: 2020-11-19 | Stop reason: HOSPADM

## 2020-11-08 RX ADMIN — FERROUS SULFATE TAB 325 MG (65 MG ELEMENTAL FE) 325 MG: 325 (65 FE) TAB at 09:54

## 2020-11-08 RX ADMIN — METRONIDAZOLE 500 MG: 500 INJECTION, SOLUTION INTRAVENOUS at 06:16

## 2020-11-08 RX ADMIN — SODIUM CHLORIDE: 9 INJECTION, SOLUTION INTRAVENOUS at 04:39

## 2020-11-08 RX ADMIN — CITALOPRAM 30 MG: 20 TABLET, FILM COATED ORAL at 09:54

## 2020-11-08 RX ADMIN — STANDARDIZED SENNA CONCENTRATE 8.6 MG: 8.6 TABLET ORAL at 09:54

## 2020-11-08 RX ADMIN — HYDRALAZINE HYDROCHLORIDE 50 MG: 50 TABLET, FILM COATED ORAL at 09:54

## 2020-11-08 RX ADMIN — ATORVASTATIN CALCIUM 40 MG: 40 TABLET, FILM COATED ORAL at 20:46

## 2020-11-08 RX ADMIN — ACETAMINOPHEN 650 MG: 325 TABLET ORAL at 20:47

## 2020-11-08 RX ADMIN — FERROUS SULFATE TAB 325 MG (65 MG ELEMENTAL FE) 325 MG: 325 (65 FE) TAB at 16:56

## 2020-11-08 RX ADMIN — CEFTRIAXONE SODIUM 1 G: 1 INJECTION, POWDER, FOR SOLUTION INTRAMUSCULAR; INTRAVENOUS at 21:34

## 2020-11-08 RX ADMIN — HYDRALAZINE HYDROCHLORIDE 50 MG: 50 TABLET, FILM COATED ORAL at 20:48

## 2020-11-08 RX ADMIN — PANTOPRAZOLE SODIUM 40 MG: 40 TABLET, DELAYED RELEASE ORAL at 09:54

## 2020-11-08 RX ADMIN — METRONIDAZOLE 500 MG: 500 INJECTION, SOLUTION INTRAVENOUS at 14:39

## 2020-11-08 RX ADMIN — GLYCOPYRROLATE AND FORMOTEROL FUMARATE 2 PUFF: 9; 4.8 AEROSOL, METERED RESPIRATORY (INHALATION) at 16:42

## 2020-11-08 RX ADMIN — BUSPIRONE HYDROCHLORIDE 10 MG: 10 TABLET ORAL at 20:46

## 2020-11-08 RX ADMIN — FUROSEMIDE 40 MG: 40 TABLET ORAL at 09:54

## 2020-11-08 RX ADMIN — ARIPIPRAZOLE 15 MG: 15 TABLET ORAL at 09:54

## 2020-11-08 RX ADMIN — MODAFINIL 100 MG: 100 TABLET ORAL at 09:54

## 2020-11-08 RX ADMIN — FOLIC ACID 1 MG: 1 TABLET ORAL at 09:54

## 2020-11-08 RX ADMIN — METRONIDAZOLE 500 MG: 500 INJECTION, SOLUTION INTRAVENOUS at 23:04

## 2020-11-08 RX ADMIN — ALLOPURINOL 500 MG: 300 TABLET ORAL at 09:54

## 2020-11-08 RX ADMIN — POTASSIUM CHLORIDE 40 MEQ: 1500 TABLET, EXTENDED RELEASE ORAL at 16:56

## 2020-11-08 RX ADMIN — BUSPIRONE HYDROCHLORIDE 10 MG: 10 TABLET ORAL at 09:54

## 2020-11-08 RX ADMIN — TAMSULOSIN HYDROCHLORIDE 0.4 MG: 0.4 CAPSULE ORAL at 20:46

## 2020-11-08 RX ADMIN — MAGNESIUM SULFATE HEPTAHYDRATE 2 G: 40 INJECTION, SOLUTION INTRAVENOUS at 16:56

## 2020-11-08 RX ADMIN — GABAPENTIN 400 MG: 400 CAPSULE ORAL at 20:46

## 2020-11-08 RX ADMIN — MULTIPLE VITAMINS W/ MINERALS TAB 1 TABLET: TAB at 09:54

## 2020-11-08 RX ADMIN — GABAPENTIN 400 MG: 400 CAPSULE ORAL at 09:54

## 2020-11-08 ASSESSMENT — ENCOUNTER SYMPTOMS
SORE THROAT: 0
EYE DISCHARGE: 0
DIARRHEA: 0
ABDOMINAL PAIN: 0
WHEEZING: 0
NAUSEA: 0
COUGH: 0
SHORTNESS OF BREATH: 0
EYE PAIN: 0
RHINORRHEA: 0
VOMITING: 0
ABDOMINAL DISTENTION: 0

## 2020-11-08 ASSESSMENT — PAIN SCALES - GENERAL
PAINLEVEL_OUTOF10: 6
PAINLEVEL_OUTOF10: 8
PAINLEVEL_OUTOF10: 0

## 2020-11-08 ASSESSMENT — PAIN DESCRIPTION - LOCATION: LOCATION: RECTUM

## 2020-11-08 ASSESSMENT — PAIN DESCRIPTION - FREQUENCY: FREQUENCY: INTERMITTENT

## 2020-11-08 ASSESSMENT — PAIN DESCRIPTION - DESCRIPTORS: DESCRIPTORS: ACHING

## 2020-11-08 ASSESSMENT — PAIN DESCRIPTION - ONSET: ONSET: ON-GOING

## 2020-11-08 ASSESSMENT — PAIN DESCRIPTION - ORIENTATION: ORIENTATION: RIGHT;LEFT

## 2020-11-08 ASSESSMENT — PAIN DESCRIPTION - PROGRESSION: CLINICAL_PROGRESSION: GRADUALLY WORSENING

## 2020-11-08 ASSESSMENT — PAIN DESCRIPTION - PAIN TYPE: TYPE: ACUTE PAIN

## 2020-11-08 NOTE — ED PROVIDER NOTES
Dyslipidemia     GERD (gastroesophageal reflux disease)     Heart failure with preserved ejection fraction (HCC)     History of alcohol abuse     History of atrial fibrillation     History of osteomyelitis     Hx of R foot wound, osteomyelitis July 2018 requiring surgery and IV Vanco    Hypertension, essential     Hypogonadism, male     Major depression     Mood disorder ([facility] [address]     Morbid obesity ([facility] [address]     DURAN (nonalcoholic steatohepatitis)     KIARA treated with BiPAP     Vitamin D deficiency      Past Surgical History:   Procedure Laterality Date    FOOT SURGERY Right     2018, R foot osteomyelitis    HIP SURGERY Left 6/29/2020    bilateral hip steroid injection, Left HIP FIRST performed by Josh Alcala MD at [facility] N/A 10/26/2020    SIGMOIDOSCOPY DIAGNOSTIC FLEXIBLE performed by Cait Danielle MD at [facility]      as a baby         MEDICATIONS     Current Facility-Administered Medications:     cefTRIAXone (ROCEPHIN) 1 g IVPB in 50 mL D5W minibag, 1 g, Intravenous, Q24H, Mohsin Reza, MD    metronidazole (FLAGYL) 500 mg in NaCl 100 mL IVPB premix, 500 mg, Intravenous, Q8H, Mohsin Reza, MD      SOCIAL HISTORY     Social History     Social History Narrative    Not on file     Social History     Tobacco Use    Smoking status: Never Smoker    Smokeless tobacco: Never Used   Substance Use Topics    Alcohol use: Not Currently     Comment: hx of abuse    Drug use: No         ALLERGIES     Allergies   Allergen Reactions    Pcn [Penicillins]      Tolerated Zosyn 9/24/2020         FAMILY HISTORY     Family History   Problem Relation Age of Onset    Heart Disease Mother         MI    Cancer Paternal Aunt         lung         PREVIOUS RECORDS   Previous records reviewed today        PHYSICAL EXAM     ED Triage Vitals [11/07/20 1703]   BP Temp Temp Source Pulse Resp SpO2 Height Weight   (!) 125/58 99.5 °F (37.5 °C) Oral 90 18 100 % 5' 7\" Patient denies any recent diarrhea or abdominal pain at this time. Patient denies any previous history of colitis. Patient does state that he had a recent open area to right buttock secondary to being in the bed for so long with Covid diagnosis during admission [facility]. At this time patient will be admitted with perirectal abscess. Rocephin and Flagyl were ordered to cover appropriately. Spoke with Dr. Kaylee Pineda and he stated he be able to be consulted to admit to hospitalist.  Patient will be admitted to the hospitalist at this time. ED RESULTS   Laboratory results:  Labs Reviewed   CBC WITH AUTO DIFFERENTIAL - Abnormal; Notable for the following components:       Result Value    RBC 3.22 (*)     Hemoglobin 9.5 (*)     Hematocrit 31.0 (*)     MCV 96.3 (*)     MCHC 30.6 (*)     RDW-CV 16.4 (*)     RDW-SD 57.9 (*)     Eosinophils Absolute 0.7 (*)     All other components within normal limits   PROTIME-INR - Abnormal; Notable for the following components:    INR 1.14 (*)     All other components within normal limits   COMPREHENSIVE METABOLIC PANEL W/ REFLEX TO MG FOR LOW K - Abnormal; Notable for the following components:    BUN 3 (*)     Potassium reflex Magnesium 3.2 (*)     Alb 3.2 (*)     All other components within normal limits   GLOMERULAR FILTRATION RATE, ESTIMATED - Abnormal; Notable for the following components:    Est, Glom Filt Rate 77 (*)     All other components within normal limits   MAGNESIUM - Abnormal; Notable for the following components:    Magnesium 1.5 (*)     All other components within normal limits   BLOOD OCCULT STOOL SCREEN #1   APTT   ANION GAP   OSMOLALITY   POCT GLUCOSE   TYPE AND SCREEN       Radiologic studies results:  CT ABDOMEN PELVIS W IV CONTRAST Additional Contrast? None   Final Result      1. Extensive thickening is seen in the region of the rectum. This is likely on the basis of colitis.    2. Persistence of extensive calcifications in the fascial planes throughout the abdomen and pelvis. This could be on the basis of dermatomyositis. 3. Hepatic steatosis. 4. There is a 3 mm pulmonary nodule near the right lung base. Follow-up is recommended in 6-12 months to assess for stability. **This report has been created using voice recognition software. It may contain minor errors which are inherent in voice recognition technology. **      Final report electronically signed by Dr Yumi Castaneda on 11/7/2020 8:17 PM          ED Medications administered this visit:   Medications   cefTRIAXone (ROCEPHIN) 1 g IVPB in 50 mL D5W minibag (has no administration in time range)   metronidazole (FLAGYL) 500 mg in NaCl 100 mL IVPB premix (has no administration in time range)   iopamidol (ISOVUE-370) 76 % injection 80 mL (80 mLs Intravenous Given 11/7/20 1950)   cefTRIAXone (ROCEPHIN) 1 g IVPB in 50 mL D5W minibag (0 g Intravenous Stopped 11/7/20 2305)   metronidazole (FLAGYL) 500 mg in NaCl 100 mL IVPB premix (0 mg Intravenous Stopped 11/8/20 0201)         ED COURSE        Strict return precautions and follow up instructions were discussed with the patient prior to discharge, with which the patient agrees. MEDICATION CHANGES     Current Discharge Medication List            FINAL DISPOSITION     Final diagnoses:   Perirectal abscess     Condition: condition: fair  Dispo: admit to med surg      This transcription was electronically signed. Parts of this transcriptions may have been dictated by use of voice recognition software and electronically transcribed, and parts may have been transcribed with the assistance of an ED scribe. The transcription may contain errors not detected in proofreading. Please refer to my supervising physician's documentation if my documentation differs.     Electronically Signed: Abe Ovalle, 11/08/20, 2:15 Aldair Alvarenga MD  Resident  11/08/20 [id number]

## 2020-11-08 NOTE — ED NOTES
ED to inpatient nurses report    Chief Complaint   Patient presents with    Rectal Bleeding      Present to ED from [facility]  LOC: alert and orientated to name and place  Vital signs   Vitals:    11/07/20 1929 11/07/20 2031 11/07/20 2158 11/07/20 2307   BP: 132/83 (!) 120/50 (!) 120/50 (!) 153/90   Pulse: 88 82 92 87   Resp: 15 17 17 17   Temp:       TempSrc:       SpO2: 99% 95% 96% 98%   Weight:       Height:          Oxygen Baseline room air    Current needs required room air Bipap/Cpap No  LDAs:   Peripheral IV 11/07/20 Left Shoulder (Active)   Site Assessment Clean; Intact;Dry 11/07/20 1838   Line Status Capped 11/07/20 1838   Dressing Status Clean;Dry; Intact 11/07/20 1838     Mobility: Fully dependent  Pending ED orders: none  Present condition: pt resting on cot.      Electronically signed by Avi Rivers RN on 11/7/2020 at 11:15 PM     Osiris Bhatti RN  11/07/20 9107

## 2020-11-08 NOTE — ED NOTES
Pt medicated per MAR. Pt VSS. Pt updated on POC. Call light in reach. Will continue to monitor.      Angy Lazo RN  11/07/20 [id number]

## 2020-11-08 NOTE — PROGRESS NOTES
Hospitalist Progress Note      Patient:  Leah Celeste    Unit/Bed:5K-22/022-A  YOB: 1970  MRN: [id number]   Acct: [de-identified]   PCP: Naya Jackman MD  Date of Admission: 11/7/2020    Assessment/Plan:    1. Perirectal abscess/bleeding  - surgery consulted, appreciate assistance  - CT abd shows colitis  - Patient had flexiseal and known ulcers per notes. He was seen by GI at that time and was educated on worsening bleed, drainage when he starts PO intake of food. Patient had >6 episodes of liquid stool, Flexiseal re-reordered with stool cultures to be sent. - Patient has a normal wbc count. He has remained   - continue iv abx.   - consider reconsulting GI pending surgery recommendations. - hold asa currently. (although he was sent home on asa despite the bleed, but per nursing home he is having more rectal bleeds. No lovenox either, curenlty on scd)     2. Chronic Resp failure/KIARA  - use bipap at night, and during naps. - avoid sedative meds  - continue modafinil  - fall risk from bed     3. Urinary retention   - mason in place. Was supposed to f/u with urologist     4. S/p sepsis/covid 19 pna/ prolonged hosp stay    5. S/p Dysphagia    6. IDDM  - continue home meds, Hypoglycemia protocol, ac&hs checks    7. Morbid obesity: BMI 43.7     **Please review recent discharge summary dated 11/6 for details of follow-ups and hospital course while recently admitted. Chief Complaint: Rectal bleed    Initial H and P:-    **From Chart Review:  \"51 y.o. male with multiple co-morbidities, with a prolonged hospitalization with covid pna, and multiple issues, discharged on 11/7 to [facility] who presented to [facility] with rectal bleeding. Patient had similar issues while admitted but at the [facility], patient was having rectal bleed, drainage and pain in the rectum. Per ER, pt has swelling in the area, discharge and redness.  Case was discussed with general surgery who recommend admission and consult to surgery. Patients labs were not concerning and actually better than when he was discharged. His hgb is improved as well. Pt was given rocephin and flagyl in the ER, and admitted to the floor with consults to Surgery. \"    Subjective (past 24 hours):   Patient resting in bed at the time of interview. Currently denies any physical complaints and had no changes to report overnight and into the morning. He was updated on the current plan of care, verbalizes understanding of surgery coming in today to speak with him, and had no other needs or questions at this time. Past medical history, family history, social history and allergies reviewed again and is unchanged since admission. ROS (14 point review of systems completed. Pertinent positives noted. Otherwise ROS is negative) :  GENERAL: No fever,chills, or night sweats. SKIN: No lesions or rashes. HEAD: No headaches or recent injury. EYES: No acute changes in vision, no diplopia or blurred vision. EARS: No hearing loss, no tinnitus. NOSE/THROAT: No rhinorrhea or pharyngitis, no nasal drainage. NECK: No lumps or unusual neck stiffness. PULMONARY: Respirations easy and non-labored, no acute distress. CARDIAC: No chest pain, pressure, Negative for lower leg edema. GI: Abdomen is soft and non-tender, non-distended. PERIPHERAL VASCULAR: No intermittent claudication or unusual leg cramps. MUSCULOSKELETAL: Occasional arthralgias, myalgias. NEUROLOGICAL: Denies any headache, near syncope, seizures or syncope. HEMATOLOGIC:  No unusual bruising or bleeding. PSYCH: Denies any homicidal or suicidial ideations.     Medications:  Reviewed    Infusion Medications    sodium chloride 75 mL/hr at 11/08/20 0439    dextrose       Scheduled Medications    sodium chloride flush  10 mL Intravenous 2 times per day    [Held by provider] enoxaparin  40 mg Subcutaneous Daily    allopurinol  500 mg Oral Daily    ARIPiprazole  15 mg Oral Daily    atorvastatin  40 mg Oral Nightly    busPIRone  10 mg Oral BID    citalopram  30 mg Oral Daily    ferrous sulfate  325 mg Oral BID WC    folic acid  1 mg Oral Daily    furosemide  40 mg Oral Daily    gabapentin  400 mg Oral BID    glycopyrrolate-formoterol  2 puff Inhalation BID    hydrALAZINE  50 mg Oral BID    insulin glargine  10 Units Subcutaneous Nightly    modafinil  100 mg Oral Daily    therapeutic multivitamin-minerals  1 tablet Oral Daily    pantoprazole  40 mg Oral Daily    [START ON 11/9/2020] polyethylene glycol  17 g Oral Every Other Day    senna  1 tablet Oral BID    tamsulosin  0.4 mg Oral Nightly    insulin lispro  0-18 Units Subcutaneous TID WC    insulin lispro  0-9 Units Subcutaneous Nightly    cefTRIAXone (ROCEPHIN) IV  1 g Intravenous Q24H    metroNIDAZOLE  500 mg Intravenous Q8H     PRN Meds: sodium chloride flush, acetaminophen **OR** acetaminophen, polyethylene glycol, promethazine **OR** ondansetron, magnesium sulfate, potassium chloride **OR** potassium alternative oral replacement **OR** potassium chloride, albuterol sulfate HFA, benzocaine, guaiFENesin, ondansetron, glucose, dextrose, glucagon (rDNA), dextrose      Intake/Output Summary (Last 24 hours) at 11/8/2020 0852  Last data filed at 11/8/2020 0618  Gross per 24 hour   Intake 106.91 ml   Output 1200 ml   Net -1093.09 ml       Diet:  Diet NPO, After Midnight    Exam:  /66   Pulse 82   Temp 99.3 °F (37.4 °C) (Oral)   Resp 18   Ht 5' 7\" (1.702 m)   Wt 279 lb (126.6 kg)   SpO2 96%   BMI 43.70 kg/m²     General appearance: Alert and appropriate, pleasant morbidly obese adult male. No apparent distress, appears stated age and cooperative. HEENT: Pupils equal, round, and reactive to light. Conjunctivae/corneas clear. Neck: Supple, with full range of motion. No jugular venous distention. Trachea midline. Respiratory:  Normal respiratory effort.  Clear to auscultation, bilaterally without Rales/Wheezes/Rhonchi. Cardiovascular: Regular rate and rhythm with normal S1/S2 without murmurs, rubs or gallops. Abdomen: Soft, non-tender, non-distended with normal bowel sounds. Multiple episodes of liquid stool noted. Musculoskeletal: Passive and active ROM x 4 extremities. Skin: Skin color, texture, turgor normal.  No rashes or lesions. Neurologic:  Neurovascularly intact without any focal sensory/motor deficits. Psychiatric: Alert and oriented, engaged in conversation appropriately. Thought content reasonable, decent insight  Capillary Refill: Brisk,< 3 seconds   Peripheral Pulses: +2 palpable, equal bilaterally     Labs:   Recent Labs     11/06/20 0543 11/07/20 1850   WBC 7.4 9.7   HGB 8.8* 9.5*   HCT 29.3* 31.0*    369     Recent Labs     11/06/20 0543 11/07/20 1850    145   K 3.6 3.2*    106   CO2 25 26   BUN 5* 3*   CREATININE 1.6* 1.2   CALCIUM 9.8 9.9     Recent Labs     11/07/20  1850   AST 21   ALT 13   BILITOT 0.5   ALKPHOS 91     Recent Labs     11/07/20 1850   INR 1.14*     No results for input(s): CKTOTAL, TROPONINI in the last 72 hours. Microbiology:    Blood culture #1:   Lab Results   Component Value Date    BC No growth-preliminary No growth  10/28/2020       Blood culture #2:No results found for: Franklin Lomeli    Organism:  Lab Results   Component Value Date    ORG Staphylococcus (coagulase negative) 10/22/2020         Lab Results   Component Value Date    LABGRAM  10/12/2020     Moderate segmented neutrophils observed. Rare epithelial cells observed. Rare gram positive cocci occurring singly and in pairs. MRSA culture only:No results found for: [facility]    Urine culture:   Lab Results   Component Value Date    Katrin Medley  10/06/2020     At least one of drugs in current antibiotic therapy is ineffective in vitro for isolate.      LABURIN [place] count: >100,000 CFU/mL   10/06/2020       Respiratory culture: No results found for: CULTRESP    Aerobic and Anaerobic :  No results found for: LABAERO  No results found for: LABANAE    Urinalysis:      Lab Results   Component Value Date    NITRU NEGATIVE 11/03/2020    WBCUA 25-50 11/03/2020    BACTERIA FEW 11/03/2020    RBCUA 3-5 11/03/2020    BLOODU NEGATIVE 11/03/2020    SPECGRAV >=1.030 10/06/2020    GLUCOSEU NEGATIVE 11/03/2020       Radiology:  CT ABDOMEN PELVIS W IV CONTRAST Additional Contrast? None   Final Result      1. Extensive thickening is seen in the region of the rectum. This is likely on the basis of colitis. 2. Persistence of extensive calcifications in the fascial planes throughout the abdomen and pelvis. This could be on the basis of dermatomyositis. 3. Hepatic steatosis. 4. There is a 3 mm pulmonary nodule near the right lung base. Follow-up is recommended in 6-12 months to assess for stability. **This report has been created using voice recognition software. It may contain minor errors which are inherent in voice recognition technology. **      Final report electronically signed by Dr Mariel Alcantar on 11/7/2020 8:17 PM        Ct Abdomen Pelvis W Iv Contrast Additional Contrast? None    Result Date: 11/7/2020  PROCEDURE: CT ABDOMEN PELVIS W IV CONTRAST CLINICAL INFORMATION: 76-year-old male with rectal bleeding. Possible fistula. Abdominal pain . COMPARISON: CT scan 10/20/2020. TECHNIQUE: 5 mm axial CT images were obtained through the abdomen and pelvis after the administration of intravenous and oral contrast. Coronal and sagittal reconstructions were obtained. All CT scans at this facility use dose modulation, iterative reconstruction, and/or weight-based dosing when appropriate to reduce radiation dose to as low as reasonably achievable. FINDINGS: Atelectasis is noted at the bilateral lung bases. On axial image #1 there is a 3 mm pulmonary nodule in the anterior right lung. There is no pleural effusion. The base of the heart is within acceptable limits.  The liver is hypoattenuated. This may be due to fatty infiltration. The gallbladder, pancreas, adrenal glands and spleen are within normal limits. There are some splenule seen in the left upper quadrant. The kidneys are symmetric in size, shape and degree of enhancement. There is no hydronephrosis. There is no evidence of a small bowel obstruction. There is circumferential wall thickening in the region of the rectum. There is a Dimas catheter within the lumen of the urinary bladder. The aorta and IVC are normal in caliber. Extensive calcifications are again seen in the fascial planes throughout the abdomen and pelvis. This is most notable in the right gluteal region. This finding is similar to the previous exam. Haziness is seen throughout the superficial subcutaneous soft tissues of the ventral abdominal wall. This may be on the basis of previous injections. The osseous structures are intact. There are degenerative changes in the spine. No acute fractures. 1. Extensive thickening is seen in the region of the rectum. This is likely on the basis of colitis. 2. Persistence of extensive calcifications in the fascial planes throughout the abdomen and pelvis. This could be on the basis of dermatomyositis. 3. Hepatic steatosis. 4. There is a 3 mm pulmonary nodule near the right lung base. Follow-up is recommended in 6-12 months to assess for stability. **This report has been created using voice recognition software. It may contain minor errors which are inherent in voice recognition technology. ** Final report electronically signed by Dr Deann Patrick on 11/7/2020 8:17 PM      **This report has been created using voice recognition software. It may contain minor errors which are inherent in voice recognition technology. **  Electronically signed by SANDRA Hogan CNP on 11/8/2020 at 8:52 AM

## 2020-11-08 NOTE — ED NOTES
Pt is transported to [facility] without incident. Floor nurse was contacted prior to departure.

## 2020-11-08 NOTE — H&P
History & Physical        Patient:  Ginger Stringer  YOB: 1970    MRN: [id number]     Acct: [de-identified]    PCP: Viola Tobin MD    Date of Admission: 11/7/2020    Date of Service: Pt seen/examined on 11/07/20  and Admitted to Inpatient with expected LOS greater than two midnights due to medical therapy. Chief Complaint:  Rectal bleed    Assessment/Plan:    1. Perirectal abscess/bleeding  - surgery consulted  - CT abd shows colitis  - Patient had flexiseal and known ulcers per notes. He was seen by GI at that time and was educated on worsening bleed, drainage when he starts PO intake of food. - Patient has a normal wbc count. He was afebrile. Case was discussed b/w ER and surgery who recommended admission to medicine. \  - continue iv abx.   - consider reconsulting Gi.   - hold asa currently. (although he was sent home on asa despite the bleed, but per nursing home he is having more rectal bleeds. No lovenox either, curenlty on scd)    2. Chronic Resp failure/KIARA  - use bipap at night, and during naps. - avoid sedative meds  - continue modafinil  - fall risk from bed    3. Urinary retention   - mason in place. Was supposed to f/u with urologist    4. S/p sepsis/covid 19 pna/ prolonged hosp stay  5. S/p Dysphagia  6. IDDM  - continue home meds, Hypoglycemia protocol, achs checks      Please review discharge summary dated 11/6 for details of followups and hosp course while hospitalized. History Of Present Illness:      44 y.o. male with multiple co-morbidities, with a prolonged hospitalization with covid pna, and multiple issues, discharged on 11/7 to [facility] who presented to [facility] with rectal bleeding. Patient had similar issues while admitted but at the [facility], patient was having rectal bleed, drainage and pain in the rectum. Per ER, pt has swelling in the area, discharge and redness.  Case was discussed with general surgery who recommend admission and albuterol sulfate HFA (PROVENTIL HFA) 108 (90 Base) MCG/ACT inhaler Inhale 2 puffs into the lungs every 6 hours as needed for Wheezing 11/6/20   Sri Chavez MD   gabapentin (NEURONTIN) 400 MG capsule Take 1 capsule by mouth 2 times daily for 180 days. 11/6/20 5/5/21  Sri Chavez MD   vitamin C (ASCORBIC ACID) 500 MG tablet Take 2 tablets by mouth daily 9/15/20   SANDRA Hernandes CNP   aspirin 81 MG chewable tablet Take 1 tablet by mouth daily 9/16/20   SANDRA Hernandes CNP   CHOLECALCIFEROL PO Take 2,000 Units by mouth daily    Historical Provider, MD   atorvastatin (LIPITOR) 40 MG tablet Take 40 mg by mouth nightly    Historical Provider, MD   acetaminophen (TYLENOL) 325 MG tablet Take 650 mg by mouth every 4 hours as needed for Pain    Historical Provider, MD   benzocaine (BABY ORAJEL) 7.5 % oral gel Take by mouth 4 times daily as needed for Pain (mouth pain) Take by mouth 3 times daily.     Historical Provider, MD   guaiFENesin (ROBITUSSIN) 100 MG/5ML syrup Take 200 mg by mouth every 4 hours as needed for Cough    Historical Provider, MD   busPIRone (BUSPAR) 10 MG tablet Take 10 mg by mouth 2 times daily     Historical Provider, MD   Trolamine Salicylate (ASPERCREME) 10 % LOTN Apply topically as needed for Pain 5/22/20   Mary Ann Augustin MD   pantoprazole (PROTONIX) 40 MG tablet Take 1 tablet by mouth daily 5/22/20   Mary Ann Augustin MD   allopurinol (ZYLOPRIM) 300 MG tablet Take 1 tablet by mouth daily  Patient taking differently: Take 500 mg by mouth daily  4/9/20   Tori Escobedo DO   lidocaine (XYLOCAINE) 5 % ointment Apply topically as needed to painful areas on lower back, hips, knees, and feet 3/11/20   SANDRA Puente CNP   hydrALAZINE (APRESOLINE) 50 MG tablet Take 50 mg by mouth 2 times daily     Historical Provider, MD   Wound Dressings (MAXORB EX) Apply topically    Historical Provider, MD   polyethylene glycol (GLYCOLAX) powder Take 17 g by mouth every other day Historical Provider, MD   Multiple Vitamins-Minerals (THERAPEUTIC MULTIVITAMIN-MINERALS) tablet Take 1 tablet by mouth daily    Historical Provider, MD   Diaper Rash Products (PINXAV) OINT Apply topically    Historical Provider, MD   Probiotic Product (SOBIA-BID PROBIOTIC PO) Take 1 tablet by mouth daily     Historical Provider, MD   ondansetron (ZOFRAN) 4 MG tablet Take 4 mg by mouth every 8 hours as needed for Nausea or Vomiting    Historical Provider, MD   tamsulosin (FLOMAX) 0.4 MG capsule Take 0.4 mg by mouth nightly     Historical Provider, MD   folic acid (FOLVITE) 1 MG tablet Take 1 mg by mouth daily    Historical Provider, MD   furosemide (LASIX) 40 MG tablet Take 40 mg by mouth daily    Historical Provider, MD   insulin lispro (HUMALOG) 100 UNIT/ML injection vial Inject into the skin 3 times daily (before meals) Per scale: 151-200=1 unit, 201-250=2 units, 251-300-3 units, 301-350=4 units, 351-400=5 units. Historical Provider, MD   sitaGLIPtan-metformin (JANUMET)  MG per tablet Take 1 tablet by mouth 2 times daily (with meals)    Historical Provider, MD   insulin glargine (LANTUS) 100 UNIT/ML injection vial Inject 10 Units into the skin nightly     Historical Provider, MD   senna (SENOKOT) 8.6 MG tablet Take 1 tablet by mouth 2 times daily    Historical Provider, MD   ARIPiprazole (ABILIFY PO) Take 15 mg by mouth daily     Historical Provider, MD   Citalopram Hydrobromide (CELEXA PO) Take 30 mg by mouth daily From [employer]     Historical Provider, MD   Blood Glucose Monitoring Suppl (FREESTYLE LITE) ARTIE Patient needs all supplies for qd testing. DX: 250.00 8/18/11   Shante Guaman MD       Allergies:  Pcn [penicillins]    Social History:      The patient currently lives [facility]    TOBACCO:   reports that he has never smoked. He has never used smokeless tobacco.  ETOH:   reports previous alcohol use.       Family History:       Reviewed in detail and negative for DM, CAD, Cancer, CVA. Positive as follows:        Problem Relation Age of Onset    Heart Disease Mother         MI    Cancer Paternal Aunt         lung       Diet:  Diet NPO, After Midnight    REVIEW OF SYSTEMS:   Pertinent positives as noted in the HPI. All other systems reviewed and negative. PHYSICAL EXAM:    BP (!) 120/50   Pulse 92   Temp 99.5 °F (37.5 °C) (Oral)   Resp 17   Ht 5' 7\" (1.702 m)   Wt 279 lb (126.6 kg)   SpO2 96%   BMI 43.70 kg/m²     General appearance:  No apparent distress, appears stated age and cooperative. HEENT:  Normal cephalic, atraumatic without obvious deformity. Pupils equal, round, and reactive to light. Extra ocular muscles intact. Conjunctivae/corneas clear. Neck: Supple, with full range of motion. No jugular venous distention. Trachea midline. Respiratory:  Normal respiratory effort. Clear   Cardiovascular:  Regular rate and rhythm   Abdomen: Soft, non-tender, non-distended  Musculoskeletal:  No clubbing, cyanosis or edema bilaterally. Full range of motion without deformity. Skin: buttock - dressing in place, images reviewed  Neurologic:   grossly non-focal.  Psychiatric:  Alert and oriented, thought content appropriate, normal insight  Peripheral Pulses: present    Labs:     Recent Labs     11/05/20  0544 11/06/20  0543 11/07/20  1850   WBC 8.1 7.4 9.7   HGB 9.7* 8.8* 9.5*   HCT 32.8* 29.3* 31.0*    355 369     Recent Labs     11/05/20  0544 11/06/20  0543 11/07/20  1850    143 145   K 3.5 3.6 3.2*    106 106   CO2 24 25 26   BUN 7 5* 3*   CREATININE 1.7* 1.6* 1.2   CALCIUM 10.2 9.8 9.9     Recent Labs     11/07/20  1850   AST 21   ALT 13   BILITOT 0.5   ALKPHOS 91     Recent Labs     11/07/20  1850   INR 1.14*     No results for input(s): CKTOTAL, TROPONINI in the last 72 hours.     Urinalysis:      Lab Results   Component Value Date    NITRU NEGATIVE 11/03/2020    WBCUA 25-50 11/03/2020    BACTERIA FEW 11/03/2020    RBCUA 3-5 11/03/2020    BLOODU NEGATIVE 11/03/2020    SPECGRAV >=1.030 10/06/2020    GLUCOSEU NEGATIVE 11/03/2020       Intake & Output:  No intake/output data recorded. No intake/output data recorded. Radiology:     CXR: I have reviewed the CXR with the following interpretation:   EKG:  I have reviewed the EKG with the following interpretation:     CT ABDOMEN PELVIS W IV CONTRAST Additional Contrast? None   Final Result      1. Extensive thickening is seen in the region of the rectum. This is likely on the basis of colitis. 2. Persistence of extensive calcifications in the fascial planes throughout the abdomen and pelvis. This could be on the basis of dermatomyositis. 3. Hepatic steatosis. 4. There is a 3 mm pulmonary nodule near the right lung base. Follow-up is recommended in 6-12 months to assess for stability. **This report has been created using voice recognition software. It may contain minor errors which are inherent in voice recognition technology. **      Final report electronically signed by Dr Fabiola Napoles on 11/7/2020 8:17 PM           DVT prophylaxis: scd. Code Status: Prior    Disposition:SNF    Active Hospital Problems    Diagnosis Date Noted    Perirectal abscess [K61.1] 11/07/2020       Thank you Jericho Franklin MD for the opportunity to be involved in this patient's care.     Electronically signed by Yissel Tubbs MD on 11/7/2020 at 10:53 PM

## 2020-11-08 NOTE — ED NOTES
Pt asleep on cot, pt VSS. Pt denies needs, Call light in reach. Will continue to monitor.      Acosta Heredia RN  11/07/20 2032

## 2020-11-08 NOTE — ED NOTES
Spoke with pt nurse, Araceli Poon from [facility] to update on pt POC.       Severiano Primas, RN  11/07/20 0214

## 2020-11-08 NOTE — PROGRESS NOTES
Surg Pt seen consult to follow  Complex pt  COVID in AUG  Has rectal colitis  Has purulent drainage perirectal area with skin breakdown  No abscess on CT  Problem is debridement in this area would likely need colostomy  Will get opinion from Dr Cirilo Hawkins he had seen pt before

## 2020-11-08 NOTE — ED NOTES
Pt updated on POC. Pt resting on cot, respirations easy and unlabored. Will continue to monitor.       Bianca Nicholas RN  11/07/20 9849

## 2020-11-08 NOTE — ED NOTES
Pt medicated per MAR. Pt VSS. PT updated on POC.  Call light in reach, will continue to monitor.,      Michael Rockwell RN  11/07/20 [id number]

## 2020-11-09 LAB
GLUCOSE BLD-MCNC: 74 MG/DL (ref 70–108)
GLUCOSE BLD-MCNC: 76 MG/DL (ref 70–108)
GLUCOSE BLD-MCNC: 80 MG/DL (ref 70–108)
GLUCOSE BLD-MCNC: 83 MG/DL (ref 70–108)
MAGNESIUM: 1.8 MG/DL (ref 1.6–2.4)
POTASSIUM SERPL-SCNC: 3.4 MEQ/L (ref 3.5–5.2)

## 2020-11-09 PROCEDURE — 6360000002 HC RX W HCPCS: Performed by: FAMILY MEDICINE

## 2020-11-09 PROCEDURE — 99232 SBSQ HOSP IP/OBS MODERATE 35: CPT | Performed by: SURGERY

## 2020-11-09 PROCEDURE — 82948 REAGENT STRIP/BLOOD GLUCOSE: CPT

## 2020-11-09 PROCEDURE — 84132 ASSAY OF SERUM POTASSIUM: CPT

## 2020-11-09 PROCEDURE — 94761 N-INVAS EAR/PLS OXIMETRY MLT: CPT

## 2020-11-09 PROCEDURE — 94640 AIRWAY INHALATION TREATMENT: CPT

## 2020-11-09 PROCEDURE — 36415 COLL VENOUS BLD VENIPUNCTURE: CPT

## 2020-11-09 PROCEDURE — 2500000003 HC RX 250 WO HCPCS: Performed by: FAMILY MEDICINE

## 2020-11-09 PROCEDURE — 1200000003 HC TELEMETRY R&B

## 2020-11-09 PROCEDURE — 6370000000 HC RX 637 (ALT 250 FOR IP): Performed by: FAMILY MEDICINE

## 2020-11-09 PROCEDURE — 99232 SBSQ HOSP IP/OBS MODERATE 35: CPT | Performed by: NURSE PRACTITIONER

## 2020-11-09 PROCEDURE — 83735 ASSAY OF MAGNESIUM: CPT

## 2020-11-09 PROCEDURE — 2580000003 HC RX 258: Performed by: FAMILY MEDICINE

## 2020-11-09 PROCEDURE — 94660 CPAP INITIATION&MGMT: CPT

## 2020-11-09 RX ADMIN — FUROSEMIDE 40 MG: 40 TABLET ORAL at 09:48

## 2020-11-09 RX ADMIN — TAMSULOSIN HYDROCHLORIDE 0.4 MG: 0.4 CAPSULE ORAL at 21:52

## 2020-11-09 RX ADMIN — PANTOPRAZOLE SODIUM 40 MG: 40 TABLET, DELAYED RELEASE ORAL at 06:04

## 2020-11-09 RX ADMIN — CEFTRIAXONE SODIUM 1 G: 1 INJECTION, POWDER, FOR SOLUTION INTRAMUSCULAR; INTRAVENOUS at 21:54

## 2020-11-09 RX ADMIN — FERROUS SULFATE TAB 325 MG (65 MG ELEMENTAL FE) 325 MG: 325 (65 FE) TAB at 09:47

## 2020-11-09 RX ADMIN — CEFTRIAXONE SODIUM 1 G: 1 INJECTION, POWDER, FOR SOLUTION INTRAMUSCULAR; INTRAVENOUS at 21:50

## 2020-11-09 RX ADMIN — METRONIDAZOLE 500 MG: 500 INJECTION, SOLUTION INTRAVENOUS at 15:54

## 2020-11-09 RX ADMIN — GABAPENTIN 400 MG: 400 CAPSULE ORAL at 21:51

## 2020-11-09 RX ADMIN — MODAFINIL 100 MG: 100 TABLET ORAL at 09:45

## 2020-11-09 RX ADMIN — HYDRALAZINE HYDROCHLORIDE 50 MG: 50 TABLET, FILM COATED ORAL at 09:48

## 2020-11-09 RX ADMIN — SODIUM CHLORIDE: 9 INJECTION, SOLUTION INTRAVENOUS at 11:55

## 2020-11-09 RX ADMIN — GABAPENTIN 400 MG: 400 CAPSULE ORAL at 09:47

## 2020-11-09 RX ADMIN — METRONIDAZOLE 500 MG: 500 INJECTION, SOLUTION INTRAVENOUS at 22:35

## 2020-11-09 RX ADMIN — BUSPIRONE HYDROCHLORIDE 10 MG: 10 TABLET ORAL at 21:52

## 2020-11-09 RX ADMIN — BUSPIRONE HYDROCHLORIDE 10 MG: 10 TABLET ORAL at 09:46

## 2020-11-09 RX ADMIN — HYDRALAZINE HYDROCHLORIDE 50 MG: 50 TABLET, FILM COATED ORAL at 21:52

## 2020-11-09 RX ADMIN — FOLIC ACID 1 MG: 1 TABLET ORAL at 09:47

## 2020-11-09 RX ADMIN — ALLOPURINOL 500 MG: 300 TABLET ORAL at 09:46

## 2020-11-09 RX ADMIN — GLYCOPYRROLATE AND FORMOTEROL FUMARATE 2 PUFF: 9; 4.8 AEROSOL, METERED RESPIRATORY (INHALATION) at 17:48

## 2020-11-09 RX ADMIN — ATORVASTATIN CALCIUM 40 MG: 40 TABLET, FILM COATED ORAL at 21:52

## 2020-11-09 RX ADMIN — FERROUS SULFATE TAB 325 MG (65 MG ELEMENTAL FE) 325 MG: 325 (65 FE) TAB at 17:56

## 2020-11-09 RX ADMIN — METRONIDAZOLE 500 MG: 500 INJECTION, SOLUTION INTRAVENOUS at 06:04

## 2020-11-09 RX ADMIN — ARIPIPRAZOLE 15 MG: 15 TABLET ORAL at 09:45

## 2020-11-09 RX ADMIN — MULTIPLE VITAMINS W/ MINERALS TAB 1 TABLET: TAB at 09:46

## 2020-11-09 RX ADMIN — CITALOPRAM 30 MG: 20 TABLET, FILM COATED ORAL at 09:46

## 2020-11-09 RX ADMIN — GLYCOPYRROLATE AND FORMOTEROL FUMARATE 2 PUFF: 9; 4.8 AEROSOL, METERED RESPIRATORY (INHALATION) at 07:33

## 2020-11-09 ASSESSMENT — PAIN SCALES - GENERAL
PAINLEVEL_OUTOF10: 0

## 2020-11-09 NOTE — PLAN OF CARE
Consult received-patient is a readmit from [facility]. Plans to return there at discharge and will need a pre cert.

## 2020-11-09 NOTE — CONSULTS
CONSULTATION NOTE :ID       Patient - Matilde Severe,  Age - 44 y.o.    - 1970      Room Number - 5K-22/022-A   MRN -  [id number]   [facility]  Date of Admission -  2020  4:57 PM  Patient's PCP: Kalyani Irby MD     Requesting Physician: SANDRA Gilliam - CNP    REASON FOR CONSULTATION   Perianal wound  CHIEF COMPLAINT   Without bleed    HISTORY OF PRESENT ILLNESS       This is a very pleasant 44 y.o. male who was admitted to the hospital with a chief complaints of bleeding. He was discharged recently from the hospital after he had a prolonged hospitalization following COVID-19 infection with respiratory failure. During his ICU stay he had a Flexi-Seal that stayed for some time and developed a rectal bleeding he was noted to have ulceration on his rectum extending the anal area contributing to frequent bleeding. He had sigmoidoscopy was noted to have ulcerations. This bleeding slowed down and subsequently was discharged to the nursing home only to be admitted with bleeding I was asked to evaluate the wound he denies any rectal pain he has intermittent bleeding. Patient appears to be stronger and more awake than his last hospitalization. He denies any fever or chills.     PAST MEDICAL  HISTORY       Past Medical History:   Diagnosis Date    Aortic stenosis, mild to moderate     BPH (benign prostatic hyperplasia)     Carpal tunnel syndrome on right     rt hand    Chronic gout     DM2 (diabetes mellitus, type 2) (HCC)     Dyslipidemia     GERD (gastroesophageal reflux disease)     Heart failure with preserved ejection fraction (HCC)     History of alcohol abuse     History of atrial fibrillation     History of osteomyelitis     Hx of R foot wound, osteomyelitis 2018 requiring surgery and IV Vanco    Hypertension, essential     Hypogonadism, male     Major depression     Mood disorder ([address]     Morbid obesity ([address]  DURAN (nonalcoholic steatohepatitis)     KIARA treated with BiPAP     Vitamin D deficiency        PAST SURGICAL HISTORY     Past Surgical History:   Procedure Laterality Date    FOOT SURGERY Right     2018, R foot osteomyelitis    HIP SURGERY Left 6/29/2020    bilateral hip steroid injection, Left HIP FIRST performed by Noel Esparza MD at [facility] N/A 10/26/2020    SIGMOIDOSCOPY DIAGNOSTIC FLEXIBLE performed by Luisana Monteiro MD at [facility]      as a baby         MEDICATIONS:       Scheduled Meds:   sodium chloride flush  10 mL Intravenous 2 times per day    [Held by provider] enoxaparin  40 mg Subcutaneous Daily    allopurinol  500 mg Oral Daily    ARIPiprazole  15 mg Oral Daily    atorvastatin  40 mg Oral Nightly    busPIRone  10 mg Oral BID    citalopram  30 mg Oral Daily    ferrous sulfate  325 mg Oral BID WC    folic acid  1 mg Oral Daily    furosemide  40 mg Oral Daily    gabapentin  400 mg Oral BID    glycopyrrolate-formoterol  2 puff Inhalation BID    hydrALAZINE  50 mg Oral BID    insulin glargine  10 Units Subcutaneous Nightly    modafinil  100 mg Oral Daily    therapeutic multivitamin-minerals  1 tablet Oral Daily    pantoprazole  40 mg Oral Daily    senna  1 tablet Oral BID    tamsulosin  0.4 mg Oral Nightly    insulin lispro  0-18 Units Subcutaneous TID WC    insulin lispro  0-9 Units Subcutaneous Nightly    polyethylene glycol  17 g Oral Every Other Day    cefTRIAXone (ROCEPHIN) IV  1 g Intravenous Q24H    metroNIDAZOLE  500 mg Intravenous Q8H     Continuous Infusions:   sodium chloride 75 mL/hr at 11/09/20 1155    dextrose       PRN Meds:sodium chloride flush, acetaminophen **OR** acetaminophen, polyethylene glycol, promethazine **OR** ondansetron, magnesium sulfate, potassium chloride **OR** potassium alternative oral replacement **OR** potassium chloride, albuterol sulfate HFA, benzocaine, guaiFENesin, ondansetron, glucose, dextrose, glucagon (rDNA), dextrose  Allergies:   ALLERGIES:    Pcn [penicillins]        SOCIAL HISTORY:     TOBACCO:   reports that he has never smoked. He has never used smokeless tobacco.     ETOH:   reports previous alcohol use. Patient currently lives in a nursing home      FAMILY HISTORY:         Problem Relation Age of Onset    Heart Disease Mother         [facility] Paternal Aunt         lung       REVIEW OF SYSTEMS:     Constitutional: no fever, no night sweats, + fatigue, no weight loss. Head: no head ache , no head injury, no migranes. Eye: no eye discharge, blurring of vision, no double vision,no eye pain. Ears: no hearing difficulty, no tinnitus  Mouth/throat: no ulceration, dental caries , dysphagia, no hoarseness and voice change  Respiratory: no cough no chest pain,no shortness of breath,no wheezing  CVS: no palpitation, no chest pain,   GI: As noted in HPI.  JORDAN: no dysuria, frequency and urgency, no hematuria, no kidney stones  Musculoskeletal: no joint pain, swelling , stiffness,  Endocrine: no polyuria, polydipsia, no cold or heat intolerance  Hematology: no anemia, no easy brusing or bleeding, no hx of clotting disorder  Dermatology: no skin rash, no skin lesions, no pruritis,  Neurological:no headaches,no dizziness, no seizure, no numbness. Psychiatry: no depression, no anxiety,no panic attacks, no suicide ideation    PHYSICAL EXAM:     BP (!) 141/84   Pulse 70   Temp 97.9 °F (36.6 °C) (Oral)   Resp 18   Ht 5' 7\" (1.702 m)   Wt 279 lb (126.6 kg)   SpO2 92%   BMI 43.70 kg/m²   General apperance:  Awake, alert, not in distress. HEENT: Pale conjunctiva, unicteric sclera, moist oral mucosa. Chest: Bilateral air entry, diminished breath sounds. Cardiovascular:  RRR ,S1S2, no murmur or gallop. Abdomen:  Soft, non tender to palpation. He has open wound over his perianal area no purulent drainage or blood was noted.   The perianal wound appears to be better than his last hospitalization  Extremities: Chronic leg swelling with changes on the lower extremity  Skin:  Warm and dry. CNS: Oriented to person place and time. LABS:     CBC:   Recent Labs     11/07/20 1850 11/08/20  1500   WBC 9.7 8.8   HGB 9.5* 9.7*    357     BMP:    Recent Labs     11/07/20 1850 11/08/20  1500 11/09/20  0838    142  --    K 3.2* 3.1* 3.4*    104  --    CO2 26 25  --    BUN 3* 3*  --    CREATININE 1.2 1.2  --    GLUCOSE 89 90  --      Calcium:  Recent Labs     11/08/20  1500   CALCIUM 9.1     Ionized Calcium:No results for input(s): IONCA in the last 72 hours. Magnesium:  Recent Labs     11/09/20  0838   MG 1.8     Phosphorus:No results for input(s): PHOS in the last 72 hours. BNP:No results for input(s): BNP in the last 72 hours. Glucose:  Recent Labs     11/08/20 2030 11/09/20  0827 11/09/20  1200   POCGLU 93 74 80     HgbA1C: No results for input(s): LABA1C in the last 72 hours. INR:   Recent Labs     11/07/20 1850   INR 1.14*     Hepatic:   Recent Labs     11/08/20  1500   ALKPHOS 94   ALT 14   AST 22   PROT 6.1   BILITOT 0.5   LABALBU 3.1*     Amylase and Lipase:  Recent Labs     11/08/20  1500   LACTA 1.0     Lactic Acid:   Recent Labs     11/08/20  1500   LACTA 1.0     Troponin: No results for input(s): CKTOTAL, CKMB, TROPONINI in the last 72 hours. BNP: No results for input(s): BNP in the last 72 hours. Lipids: No results for input(s): CHOL, TRIG, HDL, LDLCALC in the last 72 hours. Invalid input(s): LDL  ABGs: No results found for: PHART, PO2ART, ARY8GIR, YMZ1AJR, BEART    Cultures:      CXR:     Micro:   Lab Results   Component Value Date    BC No growth-preliminary No growth  10/28/2020       Problem list of patient      Patient Active Problem List   Diagnosis Code    History of atrial fibrillation Z86.79    BPH (benign prostatic hyperplasia) N40.0    Carpal tunnel syndrome on right G56.01    Chronic gout M1A. 9XX0    DM2 (diabetes

## 2020-11-09 NOTE — PLAN OF CARE
Problem: Impaired respiratory status  Goal: Clear lung sounds  Description: Clear lung sounds  Outcome: Ongoing  Note: Bevespi given as ordered to improve aeration and decrease work of breathing

## 2020-11-09 NOTE — CARE COORDINATION
11/9/20, 3:28 PM EST  DISCHARGE PLANNING EVALUATION:    Yessica Rios       Admitted from: ER, patient presented with rectal bleeding. 11/7/2020/ [address]  day: 2   Location: -22/022-A Reason for admit: Perirectal abscess [K61.1]  Perirectal abscess [K61.1] Status: Inpt. Admit order signed?: yes  PMH:  has a past medical history of Aortic stenosis, mild to moderate, BPH (benign prostatic hyperplasia), Carpal tunnel syndrome on right, Chronic gout, DM2 (diabetes mellitus, type 2) ([address] Dyslipidemia, GERD (gastroesophageal reflux disease), Heart failure with preserved ejection fraction ([address] History of alcohol abuse, History of atrial fibrillation, History of osteomyelitis, Hypertension, essential, Hypogonadism, male, Major depression, Mood disorder ([address] Morbid obesity ([address] DURAN (nonalcoholic steatohepatitis), KIARA treated with BiPAP, and Vitamin D deficiency. Procedure: N/A  Pertinent abnormal Imaging:CT of abdomen:   1. Extensive thickening is seen in the region of the rectum. This is likely on the basis of colitis. 2. Persistence of extensive calcifications in the fascial planes throughout the abdomen and pelvis. This could be on the basis of dermatomyositis. 3. Hepatic steatosis. 4. There is a 3 mm pulmonary nodule near the right lung base. Follow-up is recommended in 6-12 months to assess for stability.         Medications:  Scheduled Meds:   sodium chloride flush  10 mL Intravenous 2 times per day    [Held by provider] enoxaparin  40 mg Subcutaneous Daily    allopurinol  500 mg Oral Daily    ARIPiprazole  15 mg Oral Daily    atorvastatin  40 mg Oral Nightly    busPIRone  10 mg Oral BID    citalopram  30 mg Oral Daily    ferrous sulfate  325 mg Oral BID WC    folic acid  1 mg Oral Daily    furosemide  40 mg Oral Daily    gabapentin  400 mg Oral BID    glycopyrrolate-formoterol  2 puff Inhalation BID    hydrALAZINE  50 mg Oral BID    insulin glargine  10 Units Subcutaneous Nightly    modafinil  100 mg Oral Daily    therapeutic multivitamin-minerals  1 tablet Oral Daily    pantoprazole  40 mg Oral Daily    senna  1 tablet Oral BID    tamsulosin  0.4 mg Oral Nightly    insulin lispro  0-18 Units Subcutaneous TID     insulin lispro  0-9 Units Subcutaneous Nightly    polyethylene glycol  17 g Oral Every Other Day    cefTRIAXone (ROCEPHIN) IV  1 g Intravenous Q24H    metroNIDAZOLE  500 mg Intravenous Q8H     Continuous Infusions:   sodium chloride 75 mL/hr at 11/09/20 1155    dextrose        Pertinent Info/Orders/Treatment Plan: Consults to General Surgery and ID, IV fluids, Rocephin, DM management, IV Flagyl, Glycolax and Senokot scheduled, prn Tylenol, albuterol orajel, robitussin, Glycolax, Phenergan or Zofran, Potassium and Magnesium replacement protocol, daily BMP with Magnesium and CBC, oxygen, C-pap, rectal tube, SCD's, telemetry, up with assistance. Diet: DIET CARB CONTROL; Dietary Nutrition Supplements: Low Calorie High Protein Supplement   Smoking status:  reports that he has never smoked. He has never used smokeless tobacco.   PCP: Mustapha Browning MD  Readmission 30 days or less: Yes the day after he was discharged from the   Readmission Risk Score: 46%  Patient Goals/Plan/Treatment Preferences: Gurmeet Motley was discharged the day prior to his admission to [facility]. The plan at discharge is for his return. Transportation/Food Security/Housekeeping Addressed:  No issues identified.     Evaluation: yes

## 2020-11-10 LAB
ANION GAP SERPL CALCULATED.3IONS-SCNC: 14 MEQ/L (ref 8–16)
BUN BLDV-MCNC: 4 MG/DL (ref 7–22)
CALCIUM SERPL-MCNC: 9.1 MG/DL (ref 8.5–10.5)
CHLORIDE BLD-SCNC: 107 MEQ/L (ref 98–111)
CO2: 24 MEQ/L (ref 23–33)
CREAT SERPL-MCNC: 1.2 MG/DL (ref 0.4–1.2)
CULTURE, STOOL: NORMAL
ERYTHROCYTE [DISTWIDTH] IN BLOOD BY AUTOMATED COUNT: 15.9 % (ref 11.5–14.5)
ERYTHROCYTE [DISTWIDTH] IN BLOOD BY AUTOMATED COUNT: 58.5 FL (ref 35–45)
GFR SERPL CREATININE-BSD FRML MDRD: 77 ML/MIN/1.73M2
GLUCOSE BLD-MCNC: 106 MG/DL (ref 70–108)
GLUCOSE BLD-MCNC: 129 MG/DL (ref 70–108)
GLUCOSE BLD-MCNC: 84 MG/DL (ref 70–108)
GLUCOSE BLD-MCNC: 85 MG/DL (ref 70–108)
GLUCOSE BLD-MCNC: 87 MG/DL (ref 70–108)
HCT VFR BLD CALC: 35 % (ref 42–52)
HEMOGLOBIN: 10.5 GM/DL (ref 14–18)
MCH RBC QN AUTO: 29.7 PG (ref 26–33)
MCHC RBC AUTO-ENTMCNC: 30 GM/DL (ref 32.2–35.5)
MCV RBC AUTO: 98.9 FL (ref 80–94)
PLATELET # BLD: 343 THOU/MM3 (ref 130–400)
PMV BLD AUTO: 10.3 FL (ref 9.4–12.4)
POTASSIUM REFLEX MAGNESIUM: 3.6 MEQ/L (ref 3.5–5.2)
RBC # BLD: 3.54 MILL/MM3 (ref 4.7–6.1)
SODIUM BLD-SCNC: 145 MEQ/L (ref 135–145)
WBC # BLD: 8.8 THOU/MM3 (ref 4.8–10.8)

## 2020-11-10 PROCEDURE — 99232 SBSQ HOSP IP/OBS MODERATE 35: CPT | Performed by: NURSE PRACTITIONER

## 2020-11-10 PROCEDURE — 2700000000 HC OXYGEN THERAPY PER DAY

## 2020-11-10 PROCEDURE — APPSS30 APP SPLIT SHARED TIME 16-30 MINUTES: Performed by: NURSE PRACTITIONER

## 2020-11-10 PROCEDURE — 36415 COLL VENOUS BLD VENIPUNCTURE: CPT

## 2020-11-10 PROCEDURE — 2500000003 HC RX 250 WO HCPCS: Performed by: FAMILY MEDICINE

## 2020-11-10 PROCEDURE — 6370000000 HC RX 637 (ALT 250 FOR IP): Performed by: FAMILY MEDICINE

## 2020-11-10 PROCEDURE — 94640 AIRWAY INHALATION TREATMENT: CPT

## 2020-11-10 PROCEDURE — 97162 PT EVAL MOD COMPLEX 30 MIN: CPT

## 2020-11-10 PROCEDURE — 94761 N-INVAS EAR/PLS OXIMETRY MLT: CPT

## 2020-11-10 PROCEDURE — 82948 REAGENT STRIP/BLOOD GLUCOSE: CPT

## 2020-11-10 PROCEDURE — 80048 BASIC METABOLIC PNL TOTAL CA: CPT

## 2020-11-10 PROCEDURE — 97530 THERAPEUTIC ACTIVITIES: CPT

## 2020-11-10 PROCEDURE — 6360000002 HC RX W HCPCS: Performed by: FAMILY MEDICINE

## 2020-11-10 PROCEDURE — 94660 CPAP INITIATION&MGMT: CPT

## 2020-11-10 PROCEDURE — 97110 THERAPEUTIC EXERCISES: CPT

## 2020-11-10 PROCEDURE — 2580000003 HC RX 258: Performed by: FAMILY MEDICINE

## 2020-11-10 PROCEDURE — 85027 COMPLETE CBC AUTOMATED: CPT

## 2020-11-10 PROCEDURE — 1200000003 HC TELEMETRY R&B

## 2020-11-10 PROCEDURE — 99232 SBSQ HOSP IP/OBS MODERATE 35: CPT | Performed by: SURGERY

## 2020-11-10 RX ADMIN — ALLOPURINOL 500 MG: 300 TABLET ORAL at 08:57

## 2020-11-10 RX ADMIN — FERROUS SULFATE TAB 325 MG (65 MG ELEMENTAL FE) 325 MG: 325 (65 FE) TAB at 16:41

## 2020-11-10 RX ADMIN — HYDRALAZINE HYDROCHLORIDE 50 MG: 50 TABLET, FILM COATED ORAL at 20:40

## 2020-11-10 RX ADMIN — METRONIDAZOLE 500 MG: 500 INJECTION, SOLUTION INTRAVENOUS at 06:05

## 2020-11-10 RX ADMIN — HYDRALAZINE HYDROCHLORIDE 50 MG: 50 TABLET, FILM COATED ORAL at 09:00

## 2020-11-10 RX ADMIN — SODIUM CHLORIDE: 9 INJECTION, SOLUTION INTRAVENOUS at 18:24

## 2020-11-10 RX ADMIN — ATORVASTATIN CALCIUM 40 MG: 40 TABLET, FILM COATED ORAL at 20:40

## 2020-11-10 RX ADMIN — FOLIC ACID 1 MG: 1 TABLET ORAL at 08:59

## 2020-11-10 RX ADMIN — PANTOPRAZOLE SODIUM 40 MG: 40 TABLET, DELAYED RELEASE ORAL at 09:00

## 2020-11-10 RX ADMIN — METRONIDAZOLE 500 MG: 500 INJECTION, SOLUTION INTRAVENOUS at 15:08

## 2020-11-10 RX ADMIN — BUSPIRONE HYDROCHLORIDE 10 MG: 10 TABLET ORAL at 20:40

## 2020-11-10 RX ADMIN — GABAPENTIN 400 MG: 400 CAPSULE ORAL at 08:59

## 2020-11-10 RX ADMIN — BUSPIRONE HYDROCHLORIDE 10 MG: 10 TABLET ORAL at 08:58

## 2020-11-10 RX ADMIN — GLYCOPYRROLATE AND FORMOTEROL FUMARATE 2 PUFF: 9; 4.8 AEROSOL, METERED RESPIRATORY (INHALATION) at 07:49

## 2020-11-10 RX ADMIN — MODAFINIL 100 MG: 100 TABLET ORAL at 08:59

## 2020-11-10 RX ADMIN — SODIUM CHLORIDE, PRESERVATIVE FREE 10 ML: 5 INJECTION INTRAVENOUS at 20:40

## 2020-11-10 RX ADMIN — CITALOPRAM 30 MG: 20 TABLET, FILM COATED ORAL at 08:58

## 2020-11-10 RX ADMIN — GLYCOPYRROLATE AND FORMOTEROL FUMARATE 2 PUFF: 9; 4.8 AEROSOL, METERED RESPIRATORY (INHALATION) at 18:15

## 2020-11-10 RX ADMIN — MULTIPLE VITAMINS W/ MINERALS TAB 1 TABLET: TAB at 09:00

## 2020-11-10 RX ADMIN — GABAPENTIN 400 MG: 400 CAPSULE ORAL at 20:39

## 2020-11-10 RX ADMIN — TAMSULOSIN HYDROCHLORIDE 0.4 MG: 0.4 CAPSULE ORAL at 20:40

## 2020-11-10 RX ADMIN — STANDARDIZED SENNA CONCENTRATE 8.6 MG: 8.6 TABLET ORAL at 09:00

## 2020-11-10 RX ADMIN — CEFTRIAXONE SODIUM 1 G: 1 INJECTION, POWDER, FOR SOLUTION INTRAMUSCULAR; INTRAVENOUS at 20:40

## 2020-11-10 RX ADMIN — ARIPIPRAZOLE 15 MG: 15 TABLET ORAL at 08:57

## 2020-11-10 RX ADMIN — FUROSEMIDE 40 MG: 40 TABLET ORAL at 08:59

## 2020-11-10 RX ADMIN — FERROUS SULFATE TAB 325 MG (65 MG ELEMENTAL FE) 325 MG: 325 (65 FE) TAB at 08:59

## 2020-11-10 RX ADMIN — METRONIDAZOLE 500 MG: 500 INJECTION, SOLUTION INTRAVENOUS at 23:57

## 2020-11-10 ASSESSMENT — PAIN SCALES - GENERAL
PAINLEVEL_OUTOF10: 0

## 2020-11-10 NOTE — DISCHARGE INSTR - COC
 Hallucinations R44.3    GERD (gastroesophageal reflux disease) K21.9    Wound of buttock S31.809A    Primary osteoarthritis of left hip M16.12    Acute on chronic anemia D64.9    Dysphagia R13.10    Hypertension, essential I10    Dyslipidemia E78.5    Aortic stenosis, mild to moderate I35.0    Perirectal abscess K61.1       Isolation/Infection:   Isolation            Contact          Patient Infection Status       Infection Onset Added Last Indicated Last Indicated By Review Planned Expiration Resolved Resolved By    MRSA 09/06/20 09/06/20 10/12/20 Culture, Respiratory        ETT 9/2020, 10/2020  Nares 9/2020    VRE 09/06/20 09/06/20 09/06/20 VRE Screen by PCR        PCR 9/2020    Resolved    COVID-19 Rule Out 11/05/20 11/05/20 11/05/20 COVID-19 (Ordered)   11/05/20 Rule-Out Test Resulted    COVID-19 Rule Out 09/24/20 09/24/20 09/24/20 COVID-19 (Ordered)   09/24/20 [place] Nano, RN    COVID-19 Rule Out 09/23/20 09/23/20 09/23/20 COVID-19 (Ordered)   09/24/20 Rule-Out Test Resulted    COVID-19 08/05/20 08/05/20 09/06/20 COVID-19   10/21/20 [place] Nano, RN    COVID-19 Rule Out 08/05/20 08/05/20 08/05/20 COVID-19 (Ordered)   08/05/20 Rule-Out Test Resulted    COVID-19 Rule Out 06/23/20 06/23/20 06/23/20 COVID-19 (Ordered)   06/24/20 Rule-Out Test Resulted            Nurse Assessment:  Last Vital Signs: /71   Pulse 82   Temp 98.8 °F (37.1 °C) (Oral)   Resp 20   Ht 5' 7\" (1.702 m)   Wt 279 lb (126.6 kg)   SpO2 97%   BMI 43.70 kg/m²     Last documented pain score (0-10 scale): Pain Level: 0  Last Weight:   Wt Readings from Last 1 Encounters:   11/07/20 279 lb (126.6 kg)     Mental Status:  oriented and alert    IV Access:  - None    Nursing Mobility/ADLs:  Walking   Dependent  Transfer  Assisted  Bathing  Assisted  Dressing  Assisted  Toileting  Assisted  Feeding  Independent  Med Admin  Independent  Med Delivery   whole    Wound Care Documentation and Therapy:  Wound 10/12/20 Perineum (Active)   Dressing Status Other (Comment) 11/10/20 0345   Wound Cleansed Soap and water 11/08/20 1945   Dressing/Treatment Protective barrier 11/10/20 0345   Wound Assessment Erythema;Slough;Bleeding 11/10/20 0345   Drainage Amount Scant 11/10/20 0345   Drainage Description Sanguinous; Serosanguinous 11/10/20 0345   Odor None 11/10/20 0345   Sarahi-wound Assessment Excoriated;Fragile 11/10/20 0345   Number of days: 28       Wound 10/13/20 Scrotum Posterior (Active)   Wound Etiology Skin Tear 11/08/20 0730   Dressing Status Other (Comment) 11/10/20 0345   Dressing/Treatment Protective barrier 11/10/20 0345   Wound Assessment Erythema 11/10/20 0345   Drainage Amount Small 11/10/20 0345   Drainage Description Serosanguinous 11/10/20 0345   Sarahi-wound Assessment Blanchable erythema 11/10/20 0345   Number of days: 27       Wound 10/28/20 Coccyx stage 2 red open area (Active)   Dressing Status Other (Comment) 11/10/20 0345   Wound Cleansed Soap and water 11/08/20 1945   Dressing/Treatment Protective barrier 11/10/20 0345   Wound Assessment Bleeding;Erythema;Non-blanchable erythema;Pale granulation tissue 11/10/20 0345   Drainage Amount Scant 11/10/20 0345   Drainage Description Serosanguinous; Yellow 11/10/20 0345   Sarahi-wound Assessment Fragile; Non-blanchable erythema 11/10/20 0345   Number of days: 13       Wound 10/28/20 Buttocks DTI buttocks (Active)   Wound Etiology Deep tissue/Injury 11/08/20 0730   Dressing/Treatment Protective barrier 11/10/20 0345   Wound Assessment Dry 11/10/20 0345   Drainage Amount None 11/10/20 0345   Sarahi-wound Assessment Non-blanchable erythema 11/10/20 0345   Number of days: 13       Wound 10/28/20 Buttocks stage 2 mutiple areas on buttocks (Active)   Wound Etiology Pressure Stage  3 11/08/20 0730   Dressing/Treatment Protective barrier 11/10/20 0345   Wound Assessment Bleeding;Erythema 11/10/20 0345   Drainage Amount Scant 11/10/20 0345   Drainage Description Serous 11/10/20 0345   Sarahi-wound Assessment Non-blanchable erythema 11/10/20 0345   Number of days: 13        Elimination:  Continence:   · Bowel: Yes  · Bladder: Yes  Urinary Catheter: Indication for Use of Catheter: Acute urinary retention/obstruction   Colostomy/Ileostomy/Ileal Conduit: No       Date of Last BM: 11/19/2020    Intake/Output Summary (Last 24 hours) at 11/10/2020 0912  Last data filed at 11/9/2020 2211  Gross per 24 hour   Intake 1767.36 ml   Output 1900 ml   Net -132.64 ml     I/O last 3 completed shifts: In: 1767.4 [P.O.:550; I.V.:1217.4]  Out: 1900 [Urine:1900]    Safety Concerns: At Risk for Falls    Impairments/Disabilities:      None    Nutrition Therapy:  Current Nutrition Therapy:   - Oral Diet:  General    Routes of Feeding: Oral  Liquids: No Restrictions  Daily Fluid Restriction: no  Last Modified Barium Swallow with Video (Video Swallowing Test): not done    Treatments at the Time of Hospital Discharge:   Respiratory Treatments: N/A  Oxygen Therapy:  is not on home oxygen therapy. Ventilator:    - No ventilator support    Rehab Therapies: Physical Therapy and Occupational Therapy  Weight Bearing Status/Restrictions: No weight bearing restirctions  Other Medical Equipment (for information only, NOT a DME order):   Orval Dmitry Steady  Other Treatments: N/A    Patient's personal belongings (please select all that are sent with patient):  None    RN SIGNATURE:  Electronically signed by Nelly Brenner RN on 11/19/20 at 12:01 PM EST    CASE MANAGEMENT/SOCIAL WORK SECTION    Inpatient Status Date: 11/7/2020    Readmission Risk Assessment Score:  Readmission Risk              Risk of Unplanned Readmission:        47           Discharging to Facility/ Agency   · [facility]  · Address[facility] [address] [place] [address] [facility]  · Phone: [phone]  · Fax: [phone]    Dialysis Facility (if applicable)   · Name:  · Address:  · Dialysis Schedule:  · Phone:  · Fax:    / signature: Electronically signed by RAYMOND Layne on 11/10/20 at 9:13 AM EST    PHYSICIAN SECTION    Prognosis: Fair    Condition at Discharge: Stable    Rehab Potential (if transferring to Rehab): Good    Recommended Labs or Other Treatments After Discharge: follow potassium levels    Physician Certification: I certify the above information and transfer of Keshav Arriaza  is necessary for the continuing treatment of the diagnosis listed and that he requires [place] for greater 30 days.      Update Admission H&P: No change in H&P    PHYSICIAN SIGNATURE:  Electronically signed by Kurt Dockery MD on 11/19/20 at 10:16 AM EST

## 2020-11-10 NOTE — PROGRESS NOTES
[address] [place] [address]  INPATIENT PHYSICAL THERAPY  EVALUATION  [facility] ONC MED 5K - 5K-22/022-A    Time In: 0592  Time Out: 1005  Timed Code Treatment Minutes: 25 Minutes  Minutes: 43          Date: 11/10/2020  Patient Name: Leah Celeste,  Gender:  male        MRN: [id number]  : 1970  (44 y.o.)      Referring Practitioner: Rebeca Mahoney CNP  Diagnosis: Perirectal abscess  Additional Pertinent Hx: 44 y.o. male with multiple co-morbidities, with a prolonged hospitalization with covid pna, and multiple issues, discharged on  to [facility] who presented to [address] [place] [address] with rectal bleeding. Patient had similar issues while admitted but at the [facility], patient was having rectal bleed, drainage and pain in the rectum. Per ER, pt has swelling in the area, discharge and redness. Case was discussed with general surgery who recommend admission and consult to surgery. Patients labs were not concerning and actually better than when he was discharged. His hgb is improved as well. Pt was given rocephin and flagyl in the ER, and admitted to the floor with consults to Surgery. Restrictions/Precautions:  Restrictions/Precautions: Fall Risk    Subjective:  Chart Reviewed: Yes  Patient assessed for rehabilitation services?: Yes  Family / Caregiver Present: No  Subjective: RN approved session, pt is supine in bed and agreeable.     General:  Overall Orientation Status: Within Functional Limits  Follows Commands: Within Functional Limits    Vision: Within Functional Limits    Hearing: Within functional limits         Pain: 5/10: L foot    Social/Functional History:    Type of Home: Facility([facility])  Home Layout: One level  Home Equipment: Wheelchair-manual, Rolling walker      Transfer Assistance: Needs assistance        Additional Comments: Pt has been in and out of the hospital last couple months, requires +2 to stand with therapy, not ambulating currently; pt states staff has been using megan szymanski; prior to Sept was living I'ly in an apt    OBJECTIVE:  Range of Motion:  Bilateral Lower Extremity: [facility]    Strength:  Bilateral Lower Extremity: Impaired - grossly 2-/5, L ankle 0/5, R ankle 1/5    Balance:  Static Sitting Balance:  Stand By Assistance, pt sits EOB at least 10 minutes  Static Standing Balance: Contact Guard Assistance in [address] [place] [address] 2 trials, stands ~10 seconds each trial    Bed Mobility:  Supine to Sit: Moderate Assistance, increased time to complete  Scooting: Moderate Assistance    Transfers:  Sit to Stand: Minimal Assistance, 2 trials in [facility] (pt declines to complete transfer to recliner due to sore bottom)  Stand to Sit:Minimal Assistance    Exercise:  Patient was guided in 1 set(s) 10 reps of exercise to both lower extremities. Quad sets, AAROM Heelslides and AAROM Hip abduction/adduction. Exercises were completed for increased independence with functional mobility. Functional Outcome Measures: Completed  AM-PAC Inpatient Mobility Raw Score : 12  AM-PAC Inpatient T-Scale Score : 35.33    ASSESSMENT:  Activity Tolerance:  Patient tolerance of  treatment: good. Treatment Initiated: Treatment and education initiated within context of evaluation. Evaluation time included review of current medical information, gathering information related to past medical, social and functional history, completion of standardized testing, formal and informal observation of tasks, assessment of data and development of plan of care and goals. Treatment time included skilled education and facilitation of tasks to increase safety and independence with functional mobility for improved independence and quality of life. See above exercises, additional transfers, EOB balance. Assessment:   Body structures, Functions, Activity limitations: Decreased functional mobility , Decreased strength, Decreased endurance, Decreased balance, Increased pain  Assessment: Pt tolerates session fair, limited by generalized weakness, deconditioning from several recent hospitalizations. PT to continue to progress strength and functional mobility. Prognosis: Good    REQUIRES PT FOLLOW UP: Yes    Discharge Recommendations:  Discharge Recommendations: [address]    Patient Education:  PT Education: PT Role, Plan of Care    Equipment Recommendations:  Equipment Needed: No    Plan:  Times per week: 3-5x GM  Current Treatment Recommendations: Strengthening, Functional Mobility Training, Transfer Training, Balance Training, Endurance Training, Equipment Evaluation, Education, & procurement, Safety Education & Training, Patient/Caregiver Education & Training    Goals:  Patient goals : to get stronger  Short term goals  Time Frame for Short term goals: by discharge  Short term goal 1: Pt to transfer supine <--> sit CGA to enable pt to get in/out of bed. Short term goal 2: Pt to transfer sit <--> stand CGA in megan stedy or with RW for increased functional mobility. Short term goal 3: PT to assess ambulation when able. Long term goals  Time Frame for Long term goals : NA due to short length of stay. Following session, patient left in safe position with all fall risk precautions in place.

## 2020-11-10 NOTE — PROGRESS NOTES
Progress note: Infectious diseases    Patient - Brenda Bray,  Age - 44 y.o.    - 1970      Room Number - 5K-22/022-A   MRN -  [id number]   Acct # - [de-identified]  Date of Admission -  2020  4:57 PM    SUBJECTIVE:   No new issues    OBJECTIVE   VITALS    height is 5' 7\" (1.702 m) and weight is 279 lb (126.6 kg). His oral temperature is 98.8 °F (37.1 °C). His blood pressure is 125/71 and his pulse is 82. His respiration is 20 and oxygen saturation is 97%. Wt Readings from Last 3 Encounters:   20 279 lb (126.6 kg)   20 276 lb 1.6 oz (125.2 kg)   09/15/20 281 lb 6.4 oz (127.6 kg)       I/O (24 Hours)    Intake/Output Summary (Last 24 hours) at 11/10/2020 1148  Last data filed at 11/10/2020 1022  Gross per 24 hour   Intake 2593.92 ml   Output 2550 ml   Net 43.92 ml       General Appearance  Awake, alert, oriented,  Obese. HEENT - normocephalic, atraumatic, pale conjunctiva,  anicteric sclera  Neck - Supple, no mass  Lungs -  Bilateral  air entry, diminished breath sound  Cardiovascular - Heart sounds are normal.     Abdomen - soft, not distended, nontender, perianal open wound  Neurologic - awake and oriented. Skin - No bruising or bleeding  Extremities - chronically swollen legs.     MEDICATIONS:      sodium chloride flush  10 mL Intravenous 2 times per day    [Held by provider] enoxaparin  40 mg Subcutaneous Daily    allopurinol  500 mg Oral Daily    ARIPiprazole  15 mg Oral Daily    atorvastatin  40 mg Oral Nightly    busPIRone  10 mg Oral BID    citalopram  30 mg Oral Daily    ferrous sulfate  325 mg Oral BID WC    folic acid  1 mg Oral Daily    furosemide  40 mg Oral Daily    gabapentin  400 mg Oral BID    glycopyrrolate-formoterol  2 puff Inhalation BID    hydrALAZINE  50 mg Oral BID    insulin glargine  10 Units Subcutaneous Nightly    modafinil  100 mg Oral Daily    therapeutic multivitamin-minerals  1 tablet Oral Daily    pantoprazole  40 mg Oral Daily    senna  1 tablet Oral BID    tamsulosin  0.4 mg Oral Nightly    insulin lispro  0-18 Units Subcutaneous TID     insulin lispro  0-9 Units Subcutaneous Nightly    polyethylene glycol  17 g Oral Every Other Day    cefTRIAXone (ROCEPHIN) IV  1 g Intravenous Q24H    metroNIDAZOLE  500 mg Intravenous Q8H      sodium chloride 75 mL/hr at 11/09/20 1155    dextrose       sodium chloride flush, acetaminophen **OR** acetaminophen, polyethylene glycol, promethazine **OR** ondansetron, magnesium sulfate, potassium chloride **OR** potassium alternative oral replacement **OR** potassium chloride, albuterol sulfate HFA, benzocaine, guaiFENesin, ondansetron, glucose, dextrose, glucagon (rDNA), dextrose      LABS:     CBC:   Recent Labs     11/07/20 1850 11/08/20  1500 11/10/20  0648   WBC 9.7 8.8 8.8   HGB 9.5* 9.7* 10.5*    357 343     BMP:    Recent Labs     11/07/20 1850 11/08/20  1500 11/09/20  0838 11/10/20  0648    142  --  145   K 3.2* 3.1* 3.4* 3.6    104  --  107   CO2 26 25  --  24   BUN 3* 3*  --  4*   CREATININE 1.2 1.2  --  1.2   GLUCOSE 89 90  --  84     Calcium:  Recent Labs     11/10/20  0648   CALCIUM 9.1     Ionized Calcium:No results for input(s): IONCA in the last 72 hours. Magnesium:  Recent Labs     11/09/20  0838   MG 1.8     Phosphorus:No results for input(s): PHOS in the last 72 hours. BNP:No results for input(s): BNP in the last 72 hours. Glucose:  Recent Labs     11/09/20  1648 11/09/20  2210 11/10/20  0747   POCGLU 76 83 85     HgbA1C: No results for input(s): LABA1C in the last 72 hours.   INR:   Recent Labs     11/07/20  1850   INR 1.14*     Hepatic:   Recent Labs     11/07/20  1850 11/08/20  1500   ALKPHOS 91 94   ALT 13 14   AST 21 22   PROT 6.4 6.1   BILITOT 0.5 0.5   LABALBU 3.2* 3.1*     Amylase and Lipase:  Recent Labs     11/08/20  1500   LACTA 1.0     Lactic Acid: Recent Labs     11/08/20  1500   LACTA 1.0     Troponin: No results for input(s): CKTOTAL, CKMB, TROPONINI in the last 72 hours. BNP: No results for input(s): BNP in the last 72 hours. CULTURES:   UA: No results for input(s): SPECGRAV, PHUR, COLORU, CLARITYU, MUCUS, PROTEINU, BLOODU, RBCUA, WBCUA, BACTERIA, NITRU, GLUCOSEU, BILIRUBINUR, UROBILINOGEN, KETUA, LABCAST, LABCASTTY, AMORPHOS in the last 72 hours. Invalid input(s): CRYSTALS  Micro:   Lab Results   Component Value Date    BC No growth-preliminary No growth  10/28/2020          Problem list of patient:     Patient Active Problem List   Diagnosis Code    History of atrial fibrillation Z86.79    BPH (benign prostatic hyperplasia) N40.0    Carpal tunnel syndrome on right G56.01    Chronic gout M1A. 9XX0    [facility] E11.9    Heart failure with preserved ejection fraction (HCC) I50.30    History of alcohol abuse F10.11    History of osteomyelitis Z87.39    Hypogonadism, male E29.1    Major depression F32.9    Morbid obesity (HCC) E66.01    DURAN (nonalcoholic steatohepatitis) K75.81    KIARA treated with BiPAP G47.33    Vitamin D deficiency E55.9    Normocytic anemia D64.9    Physical deconditioning R53.81    Mood disorder (HCC) F39    Hallucinations R44.3    GERD (gastroesophageal reflux disease) K21.9    Wound of buttock S31.809A    Primary osteoarthritis of left hip M16.12    Acute on chronic anemia D64.9    Dysphagia R13.10    Hypertension, essential I10    Dyslipidemia E78.5    Aortic stenosis, mild to moderate I35.0    Perirectal abscess K61.1         ASSESSMENT/PLAN   Rectal bleed due to ulceration  Perianal wound: better  GI to see. I was told GI associate has terminated him from their practice. Will consult Dr Cynthia Paez.       Michael Steen MD, FACP 11/10/2020 11:48 AM

## 2020-11-10 NOTE — PROGRESS NOTES
[facility]   Dr. Giles Spearing  Daily Progress Note  Pt Name: Alba Mitchell  Medical Record Number: [id number]  Date of Birth 1970   Today's Date: 11/9/2020    HD#2    CHIEF COMPLAINT perirectal skin breakdown possible perirectal abscess total colitis    SUBJECTIVE  Patient feels better patient has decreased wound drainage from possible abscess    OBJECTIVE  CURRENT VITALS /74   Pulse 88   Temp 99.7 °F (37.6 °C) (Oral)   Resp 18   Ht 5' 7\" (1.702 m)   Wt 279 lb (126.6 kg)   SpO2 97%   BMI 43.70 kg/m²   LUNGS: Lungs clear   ABDOMEN: Soft bowel sounds positive  WOUNDS: Perianal skin breakdown with tenderness of the perianal area  24 HR INTAKE/OUTPUT :     Intake/Output Summary (Last 24 hours) at 11/9/2020 2214  Last data filed at 11/9/2020 2211  Gross per 24 hour   Intake 2659.36 ml   Output 1900 ml   Net 759.36 ml     DRAIN/TUBE OUTPUT :      LABS  CBC :   Lab Results   Component Value Date    WBC 8.8 11/08/2020    HGB 9.7 11/08/2020    HCT 31.5 11/08/2020     11/08/2020     BMP:   Lab Results   Component Value Date     11/08/2020    K 3.4 11/09/2020    K 3.1 11/08/2020     11/08/2020    CO2 25 11/08/2020    BUN 3 11/08/2020    CREATININE 1.2 11/08/2020    MG 1.8 11/09/2020    PHOS 3.6 10/31/2020       ASSESSMENT  1. Patient has decreased  drainage from a possible abscess CT scan did not show definitive abscess discussed with patient probable need for colostomy to get the area to heal patient at this time is not willing to consider colostomy I reviewed the perianal area there appears to be no definitive abscess but a lot of tenderness and skin breakdown      PLAN  1.   We will continue to follow and discussed with patient also will discuss case with Dr. Jay Herrera  Electronically signed 11/9/2020 at 10:14 PM

## 2020-11-10 NOTE — PROGRESS NOTES
Hospitalist Progress Note      Patient:  Jamilah Clinton    Unit/Bed:5K-22/022-A  YOB: 1970  MRN: [id number]   Acct: [de-identified]   PCP: Vladimir Carmona MD  Date of Admission: 11/7/2020    Assessment/Plan:    1. Perirectal abscess/bleeding  - surgery consulted, appreciate assistance  - CT abd shows colitis  - Patient had flexiseal and known ulcers per notes. He was seen by GI at that time and was educated on worsening bleed, drainage when he starts PO intake of food. - Patient has a normal wbc count. He has remained afebrile  - continue iv abx.   - hold asa currently. (although he was sent home on asa despite the bleed, but per nursing home he is having more rectal bleeds. No lovenox either, curenlty on scd)  -ID evaluated, appreciate assistance. Perirectal wound related to Flexi-Seal.  According to ID, the wound does appear better and no abscess was observed. Continue zinc oxide ointment and cleaning of the perianal area after bowel movements.  -Wound/ostomy consulted, appreciate assistance.  -Consult GI for rectal ulceration reevaluation, appreciate assistance/recommendations.     2. Chronic Resp failure/KIARA  - use bipap at night, and during naps. - avoid sedative meds  - continue modafinil  - fall risk from bed     3. Urinary retention   - mason in place. Was supposed to f/u with urologist     4. S/p sepsis/covid 19 pna/ prolonged hosp stay    5. S/p Dysphagia    6. IDDM  - continue home meds, Hypoglycemia protocol, ac&hs checks    7. Morbid obesity: BMI 43.7     **Please review recent discharge summary dated 11/6 for details of follow-ups and hospital course while recently admitted.        Chief Complaint: Rectal bleed    Initial H and P:-    **From Chart Review:  \"51 y.o. male with multiple co-morbidities, with a prolonged hospitalization with covid pna, and multiple issues, discharged on 11/7 to [facility] who presented to [facility] dextrose       Scheduled Medications    sodium chloride flush  10 mL Intravenous 2 times per day    [Held by provider] enoxaparin  40 mg Subcutaneous Daily    allopurinol  500 mg Oral Daily    ARIPiprazole  15 mg Oral Daily    atorvastatin  40 mg Oral Nightly    busPIRone  10 mg Oral BID    citalopram  30 mg Oral Daily    ferrous sulfate  325 mg Oral BID WC    folic acid  1 mg Oral Daily    furosemide  40 mg Oral Daily    gabapentin  400 mg Oral BID    glycopyrrolate-formoterol  2 puff Inhalation BID    hydrALAZINE  50 mg Oral BID    insulin glargine  10 Units Subcutaneous Nightly    modafinil  100 mg Oral Daily    therapeutic multivitamin-minerals  1 tablet Oral Daily    pantoprazole  40 mg Oral Daily    senna  1 tablet Oral BID    tamsulosin  0.4 mg Oral Nightly    insulin lispro  0-18 Units Subcutaneous TID WC    insulin lispro  0-9 Units Subcutaneous Nightly    polyethylene glycol  17 g Oral Every Other Day    cefTRIAXone (ROCEPHIN) IV  1 g Intravenous Q24H    metroNIDAZOLE  500 mg Intravenous Q8H     PRN Meds: sodium chloride flush, acetaminophen **OR** acetaminophen, polyethylene glycol, promethazine **OR** ondansetron, magnesium sulfate, potassium chloride **OR** potassium alternative oral replacement **OR** potassium chloride, albuterol sulfate HFA, benzocaine, guaiFENesin, ondansetron, glucose, dextrose, glucagon (rDNA), dextrose      Intake/Output Summary (Last 24 hours) at 11/9/2020 1904  Last data filed at 11/9/2020 1852  Gross per 24 hour   Intake 3348.36 ml   Output 2500 ml   Net 848.36 ml       Diet:  DIET CARB CONTROL; Dietary Nutrition Supplements: Low Calorie High Protein Supplement    Exam:  /74   Pulse 87   Temp 98.1 °F (36.7 °C) (Oral)   Resp 18   Ht 5' 7\" (1.702 m)   Wt 279 lb (126.6 kg)   SpO2 96%   BMI 43.70 kg/m²     General appearance: Alert and appropriate, pleasant morbidly obese adult male.   No apparent distress, appears stated age and cooperative. HEENT: Pupils equal, round, and reactive to light. Conjunctivae/corneas clear. Neck: Supple, with full range of motion. No jugular venous distention. Trachea midline. Respiratory:  Normal respiratory effort. Clear to auscultation, bilaterally without Rales/Wheezes/Rhonchi. Cardiovascular: Regular rate and rhythm with normal S1/S2 without murmurs, rubs or gallops. Abdomen: Soft, non-tender, non-distended with normal bowel sounds. Multiple episodes of liquid stool noted. Musculoskeletal: Passive and active ROM x 4 extremities. Skin: Skin color, texture, turgor normal.  No rashes or lesions. Neurologic:  Neurovascularly intact without any focal sensory/motor deficits. Psychiatric: Alert and oriented, engaged in conversation appropriately. Thought content reasonable, decent insight  Capillary Refill: Brisk,< 3 seconds   Peripheral Pulses: +2 palpable, equal bilaterally     Labs:   Recent Labs     11/07/20 1850 11/08/20  1500   WBC 9.7 8.8   HGB 9.5* 9.7*   HCT 31.0* 31.5*    357     Recent Labs     11/07/20 1850 11/08/20  1500 11/09/20  0838    142  --    K 3.2* 3.1* 3.4*    104  --    CO2 26 25  --    BUN 3* 3*  --    CREATININE 1.2 1.2  --    CALCIUM 9.9 9.1  --      Recent Labs     11/07/20 1850 11/08/20  1500   AST 21 22   ALT 13 14   BILITOT 0.5 0.5   ALKPHOS 91 94     Recent Labs     11/07/20 1850   INR 1.14*     No results for input(s): Yazmin Collazo in the last 72 hours. Microbiology:    Blood culture #1:   Lab Results   Component Value Date    BC No growth-preliminary No growth  10/28/2020       Blood culture #2:No results found for: Kevin Queen    Organism:  Lab Results   Component Value Date    ORG Staphylococcus (coagulase negative) 10/22/2020         Lab Results   Component Value Date    LABGRAM  10/12/2020     Moderate segmented neutrophils observed. Rare epithelial cells observed. Rare gram positive cocci occurring singly and in pairs.          MRSA culture only:No results found for: [facility]    Urine culture:   Lab Results   Component Value Date    Malgorzata Tejada  10/06/2020     At least one of drugs in current antibiotic therapy is ineffective in vitro for isolate. LABURIN Lima count: >100,000 CFU/mL   10/06/2020       Respiratory culture: No results found for: CULTRESP    Aerobic and Anaerobic :  No results found for: LABAERO  No results found for: LABANAE    Urinalysis:      Lab Results   Component Value Date    NITRU NEGATIVE 11/03/2020    WBCUA 25-50 11/03/2020    BACTERIA FEW 11/03/2020    RBCUA 3-5 11/03/2020    BLOODU NEGATIVE 11/03/2020    SPECGRAV >=1.030 10/06/2020    GLUCOSEU NEGATIVE 11/03/2020       Radiology:  CT ABDOMEN PELVIS W IV CONTRAST Additional Contrast? None   Final Result      1. Extensive thickening is seen in the region of the rectum. This is likely on the basis of colitis. 2. Persistence of extensive calcifications in the fascial planes throughout the abdomen and pelvis. This could be on the basis of dermatomyositis. 3. Hepatic steatosis. 4. There is a 3 mm pulmonary nodule near the right lung base. Follow-up is recommended in 6-12 months to assess for stability. **This report has been created using voice recognition software. It may contain minor errors which are inherent in voice recognition technology. **      Final report electronically signed by Dr Evans Srivastava on 11/7/2020 8:17 PM        Ct Abdomen Pelvis W Iv Contrast Additional Contrast? None    Result Date: 11/7/2020  PROCEDURE: CT ABDOMEN PELVIS W IV CONTRAST CLINICAL INFORMATION: 18-year-old male with rectal bleeding. Possible fistula. Abdominal pain . COMPARISON: CT scan 10/20/2020. TECHNIQUE: 5 mm axial CT images were obtained through the abdomen and pelvis after the administration of intravenous and oral contrast. Coronal and sagittal reconstructions were obtained.  All CT scans at this facility use dose modulation, iterative reconstruction, and/or weight-based dosing when appropriate to reduce radiation dose to as low as reasonably achievable. FINDINGS: Atelectasis is noted at the bilateral lung bases. On axial image #1 there is a 3 mm pulmonary nodule in the anterior right lung. There is no pleural effusion. The base of the heart is within acceptable limits. The liver is hypoattenuated. This may be due to fatty infiltration. The gallbladder, pancreas, adrenal glands and spleen are within normal limits. There are some splenule seen in the left upper quadrant. The kidneys are symmetric in size, shape and degree of enhancement. There is no hydronephrosis. There is no evidence of a small bowel obstruction. There is circumferential wall thickening in the region of the rectum. There is a Dimas catheter within the lumen of the urinary bladder. The aorta and IVC are normal in caliber. Extensive calcifications are again seen in the fascial planes throughout the abdomen and pelvis. This is most notable in the right gluteal region. This finding is similar to the previous exam. Haziness is seen throughout the superficial subcutaneous soft tissues of the ventral abdominal wall. This may be on the basis of previous injections. The osseous structures are intact. There are degenerative changes in the spine. No acute fractures. 1. Extensive thickening is seen in the region of the rectum. This is likely on the basis of colitis. 2. Persistence of extensive calcifications in the fascial planes throughout the abdomen and pelvis. This could be on the basis of dermatomyositis. 3. Hepatic steatosis. 4. There is a 3 mm pulmonary nodule near the right lung base. Follow-up is recommended in 6-12 months to assess for stability. **This report has been created using voice recognition software. It may contain minor errors which are inherent in voice recognition technology. ** Final report electronically signed by Dr Yumi Castaneda on 11/7/2020 8:17 PM      **This report has been created using voice recognition software. It may contain minor errors which are inherent in voice recognition technology. **  Electronically signed by SANDRA Hurtado CNP on 11/9/2020 at 7:04 PM

## 2020-11-10 NOTE — PROGRESS NOTES
[facility]  Daily Progress Note    Pt Name: Joy Lee  Medical Record Number: [id number]  Date of Birth 1970   Today's Date: 11/10/2020    HD# 3    CHIEF COMPLAINT perirectal skin breakdown possible perirectal abscess total colitis    SUBJECTIVE  Patient feels better today after using butt paste. Continues to refuse surgical intervention that was discussed by Dr Oz Chávez /71   Pulse 82   Temp 98.8 °F (37.1 °C) (Oral)   Resp 20   Ht 5' 7\" (1.702 m)   Wt 279 lb (126.6 kg)   SpO2 97%   BMI 43.70 kg/m²   General Appearance: overweight, alert   LUNGS: Lungs clear   Cardiac: heart regular   ABDOMEN: Soft bowel sounds positive  Musculoskeletal: 1-2 plus edema chronic,     WOUNDS: Perianal skin breakdown  of the perianal area, induration noted, tenderness resolved   24 HR INTAKE/OUTPUT :     Intake/Output Summary (Last 24 hours) at 11/10/2020 1332  Last data filed at 11/10/2020 1022  Gross per 24 hour   Intake 2593.92 ml   Output 2550 ml   Net 43.92 ml     DRAIN/TUBE OUTPUT :      LABS  CBC :   Lab Results   Component Value Date    WBC 8.8 11/10/2020    HGB 10.5 11/10/2020    HCT 35.0 11/10/2020     11/10/2020     BMP:   Lab Results   Component Value Date     11/10/2020    K 3.6 11/10/2020     11/10/2020    CO2 24 11/10/2020    BUN 4 11/10/2020    CREATININE 1.2 11/10/2020    MG 1.8 11/09/2020    PHOS 3.6 10/31/2020       ASSESSMENT  1. Rectal abscess. bleed   2. Urinary retention   3. HX COVID  4. Dysphagia   5. DM  6. Chronic respiratory failure/Bipap  7. Morbid Obesity            PLAN  1. Analgesia and antiemetics as needed  2. Wound care assistance   3. ID on board  4. GI consulted   5. Patient clinically feels better and declines surgical intervention at this time, surgery signing off.   Call if any changes or needs          Electronically signed by SANDRA Perales CNP on 11/10/2020 at 1:46 PM Patient seen and examined independently by me. Above discussed and I agree with CNP. Labs, cultures, and radiographs where available were reviewed. See orders for the updated patient care plan.     Shaheen Bond MD, patient is clinically not having any more purulent drainage also he is stating he is not interested in a colostomy as long as he can get away with no surgical debridement of the perianal area then he would not need a colostomy at this time we will continue conservative care will see as needed  11/10/2020   3:20 PM

## 2020-11-11 LAB
ANION GAP SERPL CALCULATED.3IONS-SCNC: 16 MEQ/L (ref 8–16)
BUN BLDV-MCNC: 3 MG/DL (ref 7–22)
CALCIUM SERPL-MCNC: 9 MG/DL (ref 8.5–10.5)
CHLORIDE BLD-SCNC: 106 MEQ/L (ref 98–111)
CO2: 24 MEQ/L (ref 23–33)
CREAT SERPL-MCNC: 1.1 MG/DL (ref 0.4–1.2)
ERYTHROCYTE [DISTWIDTH] IN BLOOD BY AUTOMATED COUNT: 16 % (ref 11.5–14.5)
ERYTHROCYTE [DISTWIDTH] IN BLOOD BY AUTOMATED COUNT: 57.5 FL (ref 35–45)
GFR SERPL CREATININE-BSD FRML MDRD: 85 ML/MIN/1.73M2
GLUCOSE BLD-MCNC: 102 MG/DL (ref 70–108)
GLUCOSE BLD-MCNC: 116 MG/DL (ref 70–108)
GLUCOSE BLD-MCNC: 119 MG/DL (ref 70–108)
GLUCOSE BLD-MCNC: 81 MG/DL (ref 70–108)
GLUCOSE BLD-MCNC: 83 MG/DL (ref 70–108)
HCT VFR BLD CALC: 33.3 % (ref 42–52)
HEMOGLOBIN: 10.1 GM/DL (ref 14–18)
MAGNESIUM: 1.6 MG/DL (ref 1.6–2.4)
MCH RBC QN AUTO: 29.6 PG (ref 26–33)
MCHC RBC AUTO-ENTMCNC: 30.3 GM/DL (ref 32.2–35.5)
MCV RBC AUTO: 97.7 FL (ref 80–94)
PLATELET # BLD: 336 THOU/MM3 (ref 130–400)
PMV BLD AUTO: 10.4 FL (ref 9.4–12.4)
POTASSIUM REFLEX MAGNESIUM: 3.1 MEQ/L (ref 3.5–5.2)
RBC # BLD: 3.41 MILL/MM3 (ref 4.7–6.1)
SODIUM BLD-SCNC: 146 MEQ/L (ref 135–145)
WBC # BLD: 7.9 THOU/MM3 (ref 4.8–10.8)

## 2020-11-11 PROCEDURE — 97110 THERAPEUTIC EXERCISES: CPT

## 2020-11-11 PROCEDURE — 2700000000 HC OXYGEN THERAPY PER DAY

## 2020-11-11 PROCEDURE — 94660 CPAP INITIATION&MGMT: CPT

## 2020-11-11 PROCEDURE — 97530 THERAPEUTIC ACTIVITIES: CPT

## 2020-11-11 PROCEDURE — 83735 ASSAY OF MAGNESIUM: CPT

## 2020-11-11 PROCEDURE — 36415 COLL VENOUS BLD VENIPUNCTURE: CPT

## 2020-11-11 PROCEDURE — 1200000003 HC TELEMETRY R&B

## 2020-11-11 PROCEDURE — 94640 AIRWAY INHALATION TREATMENT: CPT

## 2020-11-11 PROCEDURE — 80048 BASIC METABOLIC PNL TOTAL CA: CPT

## 2020-11-11 PROCEDURE — 2580000003 HC RX 258: Performed by: FAMILY MEDICINE

## 2020-11-11 PROCEDURE — 2500000003 HC RX 250 WO HCPCS: Performed by: FAMILY MEDICINE

## 2020-11-11 PROCEDURE — 85027 COMPLETE CBC AUTOMATED: CPT

## 2020-11-11 PROCEDURE — 97166 OT EVAL MOD COMPLEX 45 MIN: CPT

## 2020-11-11 PROCEDURE — 82948 REAGENT STRIP/BLOOD GLUCOSE: CPT

## 2020-11-11 PROCEDURE — 94760 N-INVAS EAR/PLS OXIMETRY 1: CPT

## 2020-11-11 PROCEDURE — 6370000000 HC RX 637 (ALT 250 FOR IP): Performed by: FAMILY MEDICINE

## 2020-11-11 PROCEDURE — 94761 N-INVAS EAR/PLS OXIMETRY MLT: CPT

## 2020-11-11 PROCEDURE — 6360000002 HC RX W HCPCS: Performed by: FAMILY MEDICINE

## 2020-11-11 PROCEDURE — 99232 SBSQ HOSP IP/OBS MODERATE 35: CPT | Performed by: NURSE PRACTITIONER

## 2020-11-11 RX ADMIN — HYDRALAZINE HYDROCHLORIDE 50 MG: 50 TABLET, FILM COATED ORAL at 20:05

## 2020-11-11 RX ADMIN — SODIUM CHLORIDE: 9 INJECTION, SOLUTION INTRAVENOUS at 11:21

## 2020-11-11 RX ADMIN — FUROSEMIDE 40 MG: 40 TABLET ORAL at 10:18

## 2020-11-11 RX ADMIN — GABAPENTIN 400 MG: 400 CAPSULE ORAL at 10:17

## 2020-11-11 RX ADMIN — METRONIDAZOLE 500 MG: 500 INJECTION, SOLUTION INTRAVENOUS at 16:08

## 2020-11-11 RX ADMIN — FERROUS SULFATE TAB 325 MG (65 MG ELEMENTAL FE) 325 MG: 325 (65 FE) TAB at 17:04

## 2020-11-11 RX ADMIN — FOLIC ACID 1 MG: 1 TABLET ORAL at 10:18

## 2020-11-11 RX ADMIN — MULTIPLE VITAMINS W/ MINERALS TAB 1 TABLET: TAB at 10:18

## 2020-11-11 RX ADMIN — CITALOPRAM 30 MG: 20 TABLET, FILM COATED ORAL at 10:16

## 2020-11-11 RX ADMIN — METRONIDAZOLE 500 MG: 500 INJECTION, SOLUTION INTRAVENOUS at 07:24

## 2020-11-11 RX ADMIN — ARIPIPRAZOLE 15 MG: 15 TABLET ORAL at 10:17

## 2020-11-11 RX ADMIN — ATORVASTATIN CALCIUM 40 MG: 40 TABLET, FILM COATED ORAL at 20:06

## 2020-11-11 RX ADMIN — HYDRALAZINE HYDROCHLORIDE 50 MG: 50 TABLET, FILM COATED ORAL at 10:17

## 2020-11-11 RX ADMIN — BUSPIRONE HYDROCHLORIDE 10 MG: 10 TABLET ORAL at 20:05

## 2020-11-11 RX ADMIN — TAMSULOSIN HYDROCHLORIDE 0.4 MG: 0.4 CAPSULE ORAL at 20:05

## 2020-11-11 RX ADMIN — POTASSIUM CHLORIDE 40 MEQ: 1500 TABLET, EXTENDED RELEASE ORAL at 10:18

## 2020-11-11 RX ADMIN — GLYCOPYRROLATE AND FORMOTEROL FUMARATE 2 PUFF: 9; 4.8 AEROSOL, METERED RESPIRATORY (INHALATION) at 20:56

## 2020-11-11 RX ADMIN — MODAFINIL 100 MG: 100 TABLET ORAL at 10:17

## 2020-11-11 RX ADMIN — ONDANSETRON 4 MG: 2 INJECTION INTRAMUSCULAR; INTRAVENOUS at 10:04

## 2020-11-11 RX ADMIN — GLYCOPYRROLATE AND FORMOTEROL FUMARATE 2 PUFF: 9; 4.8 AEROSOL, METERED RESPIRATORY (INHALATION) at 08:13

## 2020-11-11 RX ADMIN — METRONIDAZOLE 500 MG: 500 INJECTION, SOLUTION INTRAVENOUS at 23:00

## 2020-11-11 RX ADMIN — ALLOPURINOL 500 MG: 300 TABLET ORAL at 10:17

## 2020-11-11 RX ADMIN — FERROUS SULFATE TAB 325 MG (65 MG ELEMENTAL FE) 325 MG: 325 (65 FE) TAB at 10:18

## 2020-11-11 RX ADMIN — BUSPIRONE HYDROCHLORIDE 10 MG: 10 TABLET ORAL at 10:17

## 2020-11-11 RX ADMIN — GABAPENTIN 400 MG: 400 CAPSULE ORAL at 20:06

## 2020-11-11 RX ADMIN — CEFTRIAXONE SODIUM 1 G: 1 INJECTION, POWDER, FOR SOLUTION INTRAMUSCULAR; INTRAVENOUS at 21:12

## 2020-11-11 RX ADMIN — STANDARDIZED SENNA CONCENTRATE 8.6 MG: 8.6 TABLET ORAL at 10:17

## 2020-11-11 RX ADMIN — PANTOPRAZOLE SODIUM 40 MG: 40 TABLET, DELAYED RELEASE ORAL at 07:25

## 2020-11-11 ASSESSMENT — PAIN - FUNCTIONAL ASSESSMENT: PAIN_FUNCTIONAL_ASSESSMENT: PREVENTS OR INTERFERES SOME ACTIVE ACTIVITIES AND ADLS

## 2020-11-11 ASSESSMENT — PAIN DESCRIPTION - LOCATION
LOCATION: FOOT

## 2020-11-11 ASSESSMENT — PAIN SCALES - GENERAL
PAINLEVEL_OUTOF10: 0
PAINLEVEL_OUTOF10: 6
PAINLEVEL_OUTOF10: 4
PAINLEVEL_OUTOF10: 0
PAINLEVEL_OUTOF10: 5

## 2020-11-11 ASSESSMENT — PAIN DESCRIPTION - PROGRESSION: CLINICAL_PROGRESSION: GRADUALLY WORSENING

## 2020-11-11 ASSESSMENT — PAIN DESCRIPTION - PAIN TYPE
TYPE: ACUTE PAIN
TYPE: ACUTE PAIN

## 2020-11-11 ASSESSMENT — PAIN DESCRIPTION - DESCRIPTORS: DESCRIPTORS: ACHING

## 2020-11-11 ASSESSMENT — PAIN DESCRIPTION - ORIENTATION
ORIENTATION: LEFT
ORIENTATION: LEFT

## 2020-11-11 ASSESSMENT — PAIN DESCRIPTION - ONSET: ONSET: ON-GOING

## 2020-11-11 ASSESSMENT — PAIN DESCRIPTION - FREQUENCY: FREQUENCY: INTERMITTENT

## 2020-11-11 NOTE — CONSULTS
HISTORY:  He denies smoking. He has had a history of heavy  alcohol abuse and he lives at a nursing home. FAMILY HISTORY:  Positive for coronary artery disease and cancer of the  lung. REVIEW OF SYSTEMS:  Ten-point review of systems is otherwise negative. PHYSICAL EXAMINATION:  GENERAL:  The patient is a 55-year-old male. He is obese. He is awake,  alert, resting in bed, appears stable. HEAD, EARS, EYES, NOSE AND THROAT:  Pupils are equal.  EOMs are intact. Sclerae are clear. CARDIAC:  S1 and S2.  LUNGS:  Respirations are equal.  ABDOMEN:  Obese, but soft. There is no cellulitis. EXTREMITIES:  Upper and lower extremities show reasonable range of  motion. The patient was rotated to his right side and the perianal area  was viewed. He has clear-cut skin breakdown representing what appeared  be almost pressure sores, but then has another area on the left buttock  that is tender to touch, it is open. I do not really see any purulent  drainage at this time, but again it is tender to touch. ASSESSMENT/PLAN:  Apparent history of inflammation of the rectum,  possibly due to rectal tube placement for prolonged period of time with  apparently a rectal ulcer seen on sigmoidoscopy on 10/26/2020. 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 Discussed with the patient that any type of debridement in this area  would likely require a colostomy as there is no way it would heal  without. We will ask Dr. Abdirahman Mir to see the patient and we will after  confirming with him proceed with the plan. He appears to be improved  with the antibiotics and the local wound care. We will follow. GABRIELA VELASQUEZ [facility], MD    D: 11/10/2020 21:56:05       T: 11/10/2020 22:02:03     [email or web address]  Job#: [id number]     Doc#: [id number]    CC:

## 2020-11-11 NOTE — PLAN OF CARE
Problem: Nutrition  Goal: Optimal nutrition therapy  Outcome: Ongoing  Nutrition Problem #1: Increased nutrient needs  Intervention: Food and/or Nutrient Delivery: Continue Current Diet, Start Oral Nutrition Supplement  Nutritional Goals: Pt. will consume 75% or more of meals to promote wound healing during LOS.

## 2020-11-11 NOTE — PLAN OF CARE
Problem: Impaired respiratory status  Goal: Clear lung sounds  Description: Clear lung sounds  Outcome: Ongoing     Problem: Impaired respiratory status  Goal: Absence of new skin breakdown  Description: Absence of new skin breakdown  Outcome: Ongoing

## 2020-11-11 NOTE — PROGRESS NOTES
Hospitalist Progress Note      Patient:  James Velazquez    Unit/Bed:5K-22/022-A  YOB: 1970  MRN: [id number]   Acct: [de-identified]   PCP: Michelle Good MD  Date of Admission: 11/7/2020    Assessment/Plan:    1. Perirectal abscess/bleeding  - surgery consulted, appreciate assistance  - CT abd shows colitis  - Patient had flexiseal and known ulcers per notes. He was seen by GI at that time and was educated on worsening bleed, drainage when he starts PO intake of food. - Patient has a normal wbc count. He has remained afebrile  - continue iv abx.   - hold asa currently. (although he was sent home on asa despite the bleed, but per nursing home he is having more rectal bleeds. No lovenox either, curenlty on scd)  -ID evaluated, appreciate assistance. Perirectal wound related to Flexi-Seal.  According to ID, the wound does appear better and no abscess was observed. Continue zinc oxide ointment and cleaning of the perianal area after bowel movements.  -Wound/ostomy consulted, appreciate assistance. -GI consulted for rectal ulceration reevaluation, appreciate assistance/recommendations.     2. Chronic Resp failure/KIARA  - use bipap at night, and during naps. - avoid sedative meds  - continue modafinil  - fall risk from bed     3. Urinary retention   - mason in place. Was supposed to f/u with urologist     4. S/p sepsis/covid 19 pna/ prolonged hosp stay    5. S/p Dysphagia    6. IDDM  - continue home meds, Hypoglycemia protocol, ac&hs checks    7. Morbid obesity: BMI 43.7     **Please review recent discharge summary dated 11/6 for details of follow-ups and hospital course while recently admitted. Disposition: Awaiting GI recommendations.       Chief Complaint: Rectal bleed    Initial H and P:-    **From Chart Review:  \"51 y.o. male with multiple co-morbidities, with a prolonged hospitalization with covid pna, and multiple issues, discharged on 11/7 to [facility] who presented to [address] [address] with rectal bleeding. Patient had similar issues while admitted but at the [facility], patient was having rectal bleed, drainage and pain in the rectum. Per ER, pt has swelling in the area, discharge and redness. Case was discussed with general surgery who recommend admission and consult to surgery. Patients labs were not concerning and actually better than when he was discharged. His hgb is improved as well. Pt was given rocephin and flagyl in the ER, and admitted to the floor with consults to Surgery. \"    Subjective (past 24 hours):   Patient resting in bed at the time of interview. Currently denies any physical complaints and had no changes to report overnight and into the morning. He was updated on the current plan of care, verbalizes understanding, and had no other needs or questions at this time. Past medical history, family history, social history and allergies reviewed again and is unchanged since admission. ROS (14 point review of systems completed. Pertinent positives noted. Otherwise ROS is negative) :  GENERAL: No fever,chills, or night sweats. SKIN: No lesions or rashes. HEAD: No headaches or recent injury. EYES: No acute changes in vision, no diplopia or blurred vision. EARS: No hearing loss, no tinnitus. NOSE/THROAT: No rhinorrhea or pharyngitis, no nasal drainage. NECK: No lumps or unusual neck stiffness. PULMONARY: Respirations easy and non-labored, no acute distress. CARDIAC: No chest pain, pressure, Negative for lower leg edema. GI: Abdomen is soft and non-tender, non-distended. PERIPHERAL VASCULAR: No intermittent claudication or unusual leg cramps. MUSCULOSKELETAL: Occasional arthralgias, myalgias. NEUROLOGICAL: Denies any headache, near syncope, seizures or syncope. HEMATOLOGIC:  No unusual bruising or bleeding. PSYCH: Denies any homicidal or suicidial ideations.     Medications:  Reviewed    Infusion Medications    sodium chloride 75 mL/hr at 11/10/20 1824    dextrose       Scheduled Medications    sodium chloride flush  10 mL Intravenous 2 times per day    [Held by provider] enoxaparin  40 mg Subcutaneous Daily    allopurinol  500 mg Oral Daily    ARIPiprazole  15 mg Oral Daily    atorvastatin  40 mg Oral Nightly    busPIRone  10 mg Oral BID    citalopram  30 mg Oral Daily    ferrous sulfate  325 mg Oral BID WC    folic acid  1 mg Oral Daily    furosemide  40 mg Oral Daily    gabapentin  400 mg Oral BID    glycopyrrolate-formoterol  2 puff Inhalation BID    hydrALAZINE  50 mg Oral BID    insulin glargine  10 Units Subcutaneous Nightly    modafinil  100 mg Oral Daily    therapeutic multivitamin-minerals  1 tablet Oral Daily    pantoprazole  40 mg Oral Daily    senna  1 tablet Oral BID    tamsulosin  0.4 mg Oral Nightly    insulin lispro  0-18 Units Subcutaneous TID WC    insulin lispro  0-9 Units Subcutaneous Nightly    polyethylene glycol  17 g Oral Every Other Day    cefTRIAXone (ROCEPHIN) IV  1 g Intravenous Q24H    metroNIDAZOLE  500 mg Intravenous Q8H     PRN Meds: sodium chloride flush, acetaminophen **OR** acetaminophen, polyethylene glycol, promethazine **OR** ondansetron, magnesium sulfate, potassium chloride **OR** potassium alternative oral replacement **OR** potassium chloride, albuterol sulfate HFA, benzocaine, guaiFENesin, ondansetron, glucose, dextrose, glucagon (rDNA), dextrose      Intake/Output Summary (Last 24 hours) at 11/10/2020 2352  Last data filed at 11/10/2020 2030  Gross per 24 hour   Intake 2543.15 ml   Output 1850 ml   Net 693.15 ml       Diet:  DIET CARB CONTROL; Dietary Nutrition Supplements: Low Calorie High Protein Supplement    Exam:  /78   Pulse 81   Temp 98.3 °F (36.8 °C) (Oral)   Resp 18   Ht 5' 7\" (1.702 m)   Wt 279 lb (126.6 kg)   SpO2 92%   BMI 43.70 kg/m²     General appearance: Alert and appropriate, pleasant morbidly obese adult male.   No apparent distress, appears stated age and cooperative. HEENT: Pupils equal, round, and reactive to light. Conjunctivae/corneas clear. Neck: Supple, with full range of motion. No jugular venous distention. Trachea midline. Respiratory:  Normal respiratory effort. Clear to auscultation, bilaterally without Rales/Wheezes/Rhonchi. Cardiovascular: Regular rate and rhythm with normal S1/S2 without murmurs, rubs or gallops. Abdomen: Soft, non-tender, non-distended with normal bowel sounds. Multiple episodes of liquid stool noted. Musculoskeletal: Passive and active ROM x 4 extremities. Skin: Skin color, texture, turgor normal.  No rashes or lesions. Neurologic:  Neurovascularly intact without any focal sensory/motor deficits. Psychiatric: Alert and oriented, engaged in conversation appropriately. Thought content reasonable, decent insight  Capillary Refill: Brisk,< 3 seconds   Peripheral Pulses: +2 palpable, equal bilaterally     Labs:   Recent Labs     11/08/20  1500 11/10/20  0648   WBC 8.8 8.8   HGB 9.7* 10.5*   HCT 31.5* 35.0*    343     Recent Labs     11/08/20  1500 11/09/20  0838 11/10/20  0648     --  145   K 3.1* 3.4* 3.6     --  107   CO2 25  --  24   BUN 3*  --  4*   CREATININE 1.2  --  1.2   CALCIUM 9.1  --  9.1     Recent Labs     11/08/20  1500   AST 22   ALT 14   BILITOT 0.5   ALKPHOS 94     No results for input(s): INR in the last 72 hours. No results for input(s): Sunitha Lowers in the last 72 hours. Microbiology:    Blood culture #1:   Lab Results   Component Value Date    BC No growth-preliminary No growth  10/28/2020       Blood culture #2:No results found for: Sonja Barajas    Organism:  Lab Results   Component Value Date    ORG Staphylococcus (coagulase negative) 10/22/2020         Lab Results   Component Value Date    LABGRAM  10/12/2020     Moderate segmented neutrophils observed. Rare epithelial cells observed. Rare gram positive cocci occurring singly and in pairs. and/or weight-based dosing when appropriate to reduce radiation dose to as low as reasonably achievable. FINDINGS: Atelectasis is noted at the bilateral lung bases. On axial image #1 there is a 3 mm pulmonary nodule in the anterior right lung. There is no pleural effusion. The base of the heart is within acceptable limits. The liver is hypoattenuated. This may be due to fatty infiltration. The gallbladder, pancreas, adrenal glands and spleen are within normal limits. There are some splenule seen in the left upper quadrant. The kidneys are symmetric in size, shape and degree of enhancement. There is no hydronephrosis. There is no evidence of a small bowel obstruction. There is circumferential wall thickening in the region of the rectum. There is a Dimas catheter within the lumen of the urinary bladder. The aorta and IVC are normal in caliber. Extensive calcifications are again seen in the fascial planes throughout the abdomen and pelvis. This is most notable in the right gluteal region. This finding is similar to the previous exam. Haziness is seen throughout the superficial subcutaneous soft tissues of the ventral abdominal wall. This may be on the basis of previous injections. The osseous structures are intact. There are degenerative changes in the spine. No acute fractures. 1. Extensive thickening is seen in the region of the rectum. This is likely on the basis of colitis. 2. Persistence of extensive calcifications in the fascial planes throughout the abdomen and pelvis. This could be on the basis of dermatomyositis. 3. Hepatic steatosis. 4. There is a 3 mm pulmonary nodule near the right lung base. Follow-up is recommended in 6-12 months to assess for stability. **This report has been created using voice recognition software. It may contain minor errors which are inherent in voice recognition technology. ** Final report electronically signed by Dr William Taylor on 11/7/2020 8:17 PM      **This report has been created using voice recognition software. It may contain minor errors which are inherent in voice recognition technology. **  Electronically signed by SANDRA Rojas CNP on 11/10/2020 at 11:52 PM

## 2020-11-11 NOTE — PROGRESS NOTES
Progress note: Infectious diseases    Patient - Alfred Neff,  Age - 44 y.o.    - 1970      Room Number - 5K-22/022-A   MRN -  [id number]   Acct # - [de-identified]  Date of Admission -  2020  4:57 PM    SUBJECTIVE:   No new issues  No rectal bleed. OBJECTIVE   VITALS    height is 5' 7\" (1.702 m) and weight is 279 lb (126.6 kg). His axillary temperature is 97 °F (36.1 °C). His blood pressure is 142/82 (abnormal) and his pulse is 80. His respiration is 19 and oxygen saturation is 95%. Wt Readings from Last 3 Encounters:   20 279 lb (126.6 kg)   20 276 lb 1.6 oz (125.2 kg)   09/15/20 281 lb 6.4 oz (127.6 kg)       I/O (24 Hours)    Intake/Output Summary (Last 24 hours) at 2020 1400  Last data filed at 2020 0800  Gross per 24 hour   Intake 2453.69 ml   Output 2620 ml   Net -166.31 ml       General Appearance  Awake, alert, oriented,  Obese. HEENT - normocephalic, atraumatic, pale conjunctiva,  anicteric sclera  Neck - Supple, no mass  Lungs -  Bilateral  air entry, diminished breath sound  Cardiovascular - Heart sounds are normal.     Abdomen - soft, not distended, nontender, perianal open wound  Neurologic - awake and oriented. Skin - No bruising or bleeding  Extremities - chronically swollen legs.     MEDICATIONS:      sodium chloride flush  10 mL Intravenous 2 times per day    [Held by provider] enoxaparin  40 mg Subcutaneous Daily    allopurinol  500 mg Oral Daily    ARIPiprazole  15 mg Oral Daily    atorvastatin  40 mg Oral Nightly    busPIRone  10 mg Oral BID    citalopram  30 mg Oral Daily    ferrous sulfate  325 mg Oral BID WC    folic acid  1 mg Oral Daily    furosemide  40 mg Oral Daily    gabapentin  400 mg Oral BID    glycopyrrolate-formoterol  2 puff Inhalation BID    hydrALAZINE  50 mg Oral BID    [Held by provider] insulin glargine  10 Units Subcutaneous Nightly    modafinil  100 mg Oral Daily    therapeutic multivitamin-minerals  1 tablet Oral Daily    pantoprazole  40 mg Oral Daily    senna  1 tablet Oral BID    tamsulosin  0.4 mg Oral Nightly    insulin lispro  0-18 Units Subcutaneous TID     insulin lispro  0-9 Units Subcutaneous Nightly    polyethylene glycol  17 g Oral Every Other Day    cefTRIAXone (ROCEPHIN) IV  1 g Intravenous Q24H    metroNIDAZOLE  500 mg Intravenous Q8H      sodium chloride 75 mL/hr at 11/11/20 1121    dextrose       sodium chloride flush, acetaminophen **OR** acetaminophen, polyethylene glycol, promethazine **OR** ondansetron, magnesium sulfate, potassium chloride **OR** potassium alternative oral replacement **OR** potassium chloride, albuterol sulfate HFA, benzocaine, guaiFENesin, ondansetron, glucose, dextrose, glucagon (rDNA), dextrose      LABS:     CBC:   Recent Labs     11/08/20  1500 11/10/20  0648 11/11/20  0703   WBC 8.8 8.8 7.9   HGB 9.7* 10.5* 10.1*    343 336     BMP:    Recent Labs     11/08/20  1500 11/09/20  0838 11/10/20  0648 11/11/20  0703     --  145 146*   K 3.1* 3.4* 3.6 3.1*     --  107 106   CO2 25  --  24 24   BUN 3*  --  4* 3*   CREATININE 1.2  --  1.2 1.1   GLUCOSE 90  --  84 83     Calcium:  Recent Labs     11/11/20  0703   CALCIUM 9.0     Ionized Calcium:No results for input(s): IONCA in the last 72 hours. Magnesium:  Recent Labs     11/11/20  0703   MG 1.6     Phosphorus:No results for input(s): PHOS in the last 72 hours. BNP:No results for input(s): BNP in the last 72 hours. Glucose:  Recent Labs     11/10/20  2027 11/11/20  0817 11/11/20  1206   POCGLU 87 81 116*     HgbA1C: No results for input(s): LABA1C in the last 72 hours. INR:   No results for input(s): INR in the last 72 hours.   Hepatic:   Recent Labs     11/08/20  1500   ALKPHOS 94   ALT 14   AST 22   PROT 6.1   BILITOT 0.5   LABALBU 3.1*     Amylase and Lipase:  Recent Labs     11/08/20  1500   LACTA 1.0 Lactic Acid:   Recent Labs     11/08/20  1500   LACTA 1.0     Troponin: No results for input(s): CKTOTAL, CKMB, TROPONINI in the last 72 hours. BNP: No results for input(s): BNP in the last 72 hours. CULTURES:   UA: No results for input(s): SPECGRAV, PHUR, COLORU, CLARITYU, MUCUS, PROTEINU, BLOODU, RBCUA, WBCUA, BACTERIA, NITRU, GLUCOSEU, BILIRUBINUR, UROBILINOGEN, KETUA, LABCAST, LABCASTTY, AMORPHOS in the last 72 hours. Invalid input(s): CRYSTALS  Micro:   Lab Results   Component Value Date    BC No growth-preliminary No growth  10/28/2020          Problem list of patient:     Patient Active Problem List   Diagnosis Code    History of atrial fibrillation Z86.79    BPH (benign prostatic hyperplasia) N40.0    Carpal tunnel syndrome on right G56.01    Chronic gout M1A. 9XX0    DM[facility] E11.9    Heart failure with preserved ejection fraction (HCC) I50.30    History of alcohol abuse F10.11    History of osteomyelitis Z87.39    Hypogonadism, male E29.1    Major depression F32.9    Morbid obesity (HCC) E66.01    DURAN (nonalcoholic steatohepatitis) K75.81    KIARA treated with BiPAP G47.33    Vitamin D deficiency E55.9    Normocytic anemia D64.9    Physical deconditioning R53.81    Mood disorder (HCC) F39    Hallucinations R44.3    GERD (gastroesophageal reflux disease) K21.9    Wound of buttock S31.809A    Primary osteoarthritis of left hip M16.12    Acute on chronic anemia D64.9    Dysphagia R13.10    Hypertension, essential I10    Dyslipidemia E78.5    Aortic stenosis, mild to moderate I35.0    Perirectal abscess K61.1         ASSESSMENT/PLAN   Rectal bleed due to ulceration: better   Perianal wound: continue zinc oxide ointment  No ID issues at this time  I will see him as needed.      Taj Guallpa MD, [address] 11/11/2020 2:00 PM

## 2020-11-11 NOTE — PROGRESS NOTES
Comprehensive Nutrition Assessment    Type and Reason for Visit:  Initial, Consult(Poor Intake/Appetite 5 or more days; Patient also has wounds on buttocks)    Nutrition Recommendations/Plan:   Will send Ensure High Protein TID and Oracio BID. Recommend continue MVI. Will monitor POC vs. Need for additional nutrition interventions. Nutrition Assessment:    Pt. nutritionally compromised AEB wounds. At risk for further nutrition compromise r/t increased nutrient needs for wound healing, recent COVID  Infection, sepsis, pneumonia and underlying medical condition (hx DM, etoh abuse, DURAN). Nutrition recommendations/interventions as per above. Malnutrition Assessment:  Malnutrition Status:  Insufficient data    Context:  Acute Illness     Findings of the 6 clinical characteristics of malnutrition:  Energy Intake:  Unable to assess  Weight Loss:  Unable to assess(stated weight, edema)     Body Fat Loss:  Unable to assess     Muscle Mass Loss:  Unable to assess    Fluid Accumulation:  Unable to assess     Strength:  Not Performed    Estimated Daily Nutrient Needs:  Energy (kcal):  9733-5718 kcals (12-15); Weight Used for Energy Requirements:  (127 kgm 11/7)     Protein (g):  101-134 gm (1.5-2); Weight Used for Protein Requirements:  Ideal(67 kgm)     Nutrition Related Findings:  Pt. seen - very sleepy - difficulty staying awake; states appetite is \"terrible\"; denies any nausea or abdominal pain or trouble with chewing/swallowing; 1 BM noted past 24 hours; 11/11: Glucose 83, BUN 3, Cr 1.1, Sodium 146; Rx includes: MVI, Folvite, Insulin, Lasix, ATB, Glycolax, Phenergan; per MD - ? Debridement, ostomy    Wounds:  (Stage II buttocks, coccyx; DTI -buttocks; Pressure Injury - perineum, Skin tear scrotum)       Current Nutrition Therapies:    DIET CARB CONTROL;   Dietary Nutrition Supplements: Low Calorie High Protein Supplement  Dietary Nutrition Supplements: Wound Healing Oral Supplement    Anthropometric

## 2020-11-11 NOTE — PROGRESS NOTES
Supplement    Exam:  BP (!) 142/82   Pulse 80   Temp 97 °F (36.1 °C) (Axillary)   Resp 19   Ht 5' 7\" (1.702 m)   Wt 279 lb (126.6 kg)   SpO2 95%   BMI 43.70 kg/m²     General appearance: Alert and appropriate, pleasant morbidly obese adult male. No apparent distress, appears stated age and cooperative. HEENT: Pupils equal, round, and reactive to light. Conjunctivae/corneas clear. Neck: Supple, with full range of motion. No jugular venous distention. Trachea midline. Respiratory:  Normal respiratory effort. Clear to auscultation, bilaterally without Rales/Wheezes/Rhonchi. Cardiovascular: Regular rate and rhythm with normal S1/S2 without murmurs, rubs or gallops. Abdomen: Soft, non-tender, non-distended with normal bowel sounds. Multiple episodes of liquid stool noted. Musculoskeletal: Passive and active ROM x 4 extremities. Skin: Skin color, texture, turgor normal.  No rashes or lesions. Neurologic:  Neurovascularly intact without any focal sensory/motor deficits. Psychiatric: Alert and oriented, engaged in conversation appropriately. Thought content reasonable, decent insight  Capillary Refill: Brisk,< 3 seconds   Peripheral Pulses: +2 palpable, equal bilaterally     Labs:   Recent Labs     11/08/20  1500 11/10/20  0648 11/11/20  0703   WBC 8.8 8.8 7.9   HGB 9.7* 10.5* 10.1*   HCT 31.5* 35.0* 33.3*    343 336     Recent Labs     11/08/20  1500 11/09/20  0838 11/10/20  0648 11/11/20  0703     --  145 146*   K 3.1* 3.4* 3.6 3.1*     --  107 106   CO2 25  --  24 24   BUN 3*  --  4* 3*   CREATININE 1.2  --  1.2 1.1   CALCIUM 9.1  --  9.1 9.0     Recent Labs     11/08/20  1500   AST 22   ALT 14   BILITOT 0.5   ALKPHOS 94     No results for input(s): INR in the last 72 hours. No results for input(s): Earlis Effingham in the last 72 hours.     Microbiology:    Blood culture #1:   Lab Results   Component Value Date    BC No growth-preliminary No growth  10/28/2020       Blood culture #2:No results found for: BLOODCULT2    Organism:  Lab Results   Component Value Date    ORG Staphylococcus (coagulase negative) 10/22/2020         Lab Results   Component Value Date    LABGRAM  10/12/2020     Moderate segmented neutrophils observed. Rare epithelial cells observed. Rare gram positive cocci occurring singly and in pairs. MRSA culture only:No results found for: [facility]    Urine culture:   Lab Results   Component Value Date    Delta Anam  10/06/2020     At least one of drugs in current antibiotic therapy is ineffective in vitro for isolate. LABURIN [place] count: >100,000 CFU/mL   10/06/2020       Respiratory culture: No results found for: CULTRESP    Aerobic and Anaerobic :  No results found for: LABAERO  No results found for: LABANAE    Urinalysis:      Lab Results   Component Value Date    NITRU NEGATIVE 11/03/2020    WBCUA 25-50 11/03/2020    BACTERIA FEW 11/03/2020    RBCUA 3-5 11/03/2020    BLOODU NEGATIVE 11/03/2020    SPECGRAV >=1.030 10/06/2020    GLUCOSEU NEGATIVE 11/03/2020       Radiology:  CT ABDOMEN PELVIS W IV CONTRAST Additional Contrast? None   Final Result      1. Extensive thickening is seen in the region of the rectum. This is likely on the basis of colitis. 2. Persistence of extensive calcifications in the fascial planes throughout the abdomen and pelvis. This could be on the basis of dermatomyositis. 3. Hepatic steatosis. 4. There is a 3 mm pulmonary nodule near the right lung base. Follow-up is recommended in 6-12 months to assess for stability. **This report has been created using voice recognition software. It may contain minor errors which are inherent in voice recognition technology. **      Final report electronically signed by Dr Heath Clayton on 11/7/2020 8:17 PM        Ct Abdomen Pelvis W Iv Contrast Additional Contrast? None    Result Date: 11/7/2020  PROCEDURE: CT ABDOMEN PELVIS W IV CONTRAST CLINICAL INFORMATION: 75-year-old male with rectal bleeding. Possible fistula. Abdominal pain . COMPARISON: CT scan 10/20/2020. TECHNIQUE: 5 mm axial CT images were obtained through the abdomen and pelvis after the administration of intravenous and oral contrast. Coronal and sagittal reconstructions were obtained. All CT scans at this facility use dose modulation, iterative reconstruction, and/or weight-based dosing when appropriate to reduce radiation dose to as low as reasonably achievable. FINDINGS: Atelectasis is noted at the bilateral lung bases. On axial image #1 there is a 3 mm pulmonary nodule in the anterior right lung. There is no pleural effusion. The base of the heart is within acceptable limits. The liver is hypoattenuated. This may be due to fatty infiltration. The gallbladder, pancreas, adrenal glands and spleen are within normal limits. There are some splenule seen in the left upper quadrant. The kidneys are symmetric in size, shape and degree of enhancement. There is no hydronephrosis. There is no evidence of a small bowel obstruction. There is circumferential wall thickening in the region of the rectum. There is a Dimas catheter within the lumen of the urinary bladder. The aorta and IVC are normal in caliber. Extensive calcifications are again seen in the fascial planes throughout the abdomen and pelvis. This is most notable in the right gluteal region. This finding is similar to the previous exam. Haziness is seen throughout the superficial subcutaneous soft tissues of the ventral abdominal wall. This may be on the basis of previous injections. The osseous structures are intact. There are degenerative changes in the spine. No acute fractures. 1. Extensive thickening is seen in the region of the rectum. This is likely on the basis of colitis. 2. Persistence of extensive calcifications in the fascial planes throughout the abdomen and pelvis. This could be on the basis of dermatomyositis. 3. Hepatic steatosis.  4. There is a 3 mm pulmonary nodule near the right lung base. Follow-up is recommended in 6-12 months to assess for stability. **This report has been created using voice recognition software. It may contain minor errors which are inherent in voice recognition technology. ** Final report electronically signed by Dr Yumi Castaneda on 11/7/2020 8:17 PM      **This report has been created using voice recognition software. It may contain minor errors which are inherent in voice recognition technology. **  Electronically signed by SANDRA Taveras CNP on 11/11/2020 at 2:49 PM

## 2020-11-12 LAB
ANION GAP SERPL CALCULATED.3IONS-SCNC: 15 MEQ/L (ref 8–16)
BUN BLDV-MCNC: 5 MG/DL (ref 7–22)
CALCIUM SERPL-MCNC: 9.4 MG/DL (ref 8.5–10.5)
CHLORIDE BLD-SCNC: 106 MEQ/L (ref 98–111)
CO2: 26 MEQ/L (ref 23–33)
CREAT SERPL-MCNC: 1.1 MG/DL (ref 0.4–1.2)
ERYTHROCYTE [DISTWIDTH] IN BLOOD BY AUTOMATED COUNT: 15.9 % (ref 11.5–14.5)
ERYTHROCYTE [DISTWIDTH] IN BLOOD BY AUTOMATED COUNT: 56.9 FL (ref 35–45)
GFR SERPL CREATININE-BSD FRML MDRD: 85 ML/MIN/1.73M2
GLUCOSE BLD-MCNC: 120 MG/DL (ref 70–108)
GLUCOSE BLD-MCNC: 126 MG/DL (ref 70–108)
GLUCOSE BLD-MCNC: 157 MG/DL (ref 70–108)
GLUCOSE BLD-MCNC: 91 MG/DL (ref 70–108)
GLUCOSE BLD-MCNC: 94 MG/DL (ref 70–108)
HCT VFR BLD CALC: 32.5 % (ref 42–52)
HEMOGLOBIN: 9.7 GM/DL (ref 14–18)
MAGNESIUM: 1.5 MG/DL (ref 1.6–2.4)
MCH RBC QN AUTO: 29.1 PG (ref 26–33)
MCHC RBC AUTO-ENTMCNC: 29.8 GM/DL (ref 32.2–35.5)
MCV RBC AUTO: 97.6 FL (ref 80–94)
PLATELET # BLD: 326 THOU/MM3 (ref 130–400)
PMV BLD AUTO: 10.2 FL (ref 9.4–12.4)
POTASSIUM REFLEX MAGNESIUM: 3.3 MEQ/L (ref 3.5–5.2)
RBC # BLD: 3.33 MILL/MM3 (ref 4.7–6.1)
REASON FOR REJECTION: NORMAL
REJECTED TEST: NORMAL
SODIUM BLD-SCNC: 147 MEQ/L (ref 135–145)
WBC # BLD: 8.2 THOU/MM3 (ref 4.8–10.8)

## 2020-11-12 PROCEDURE — 82948 REAGENT STRIP/BLOOD GLUCOSE: CPT

## 2020-11-12 PROCEDURE — 99232 SBSQ HOSP IP/OBS MODERATE 35: CPT | Performed by: NURSE PRACTITIONER

## 2020-11-12 PROCEDURE — 94640 AIRWAY INHALATION TREATMENT: CPT

## 2020-11-12 PROCEDURE — 2700000000 HC OXYGEN THERAPY PER DAY

## 2020-11-12 PROCEDURE — 94660 CPAP INITIATION&MGMT: CPT

## 2020-11-12 PROCEDURE — 2580000003 HC RX 258: Performed by: NURSE PRACTITIONER

## 2020-11-12 PROCEDURE — 36415 COLL VENOUS BLD VENIPUNCTURE: CPT

## 2020-11-12 PROCEDURE — 85027 COMPLETE CBC AUTOMATED: CPT

## 2020-11-12 PROCEDURE — 83735 ASSAY OF MAGNESIUM: CPT

## 2020-11-12 PROCEDURE — 2500000003 HC RX 250 WO HCPCS: Performed by: FAMILY MEDICINE

## 2020-11-12 PROCEDURE — 1200000003 HC TELEMETRY R&B

## 2020-11-12 PROCEDURE — 2580000003 HC RX 258: Performed by: FAMILY MEDICINE

## 2020-11-12 PROCEDURE — 6360000002 HC RX W HCPCS: Performed by: FAMILY MEDICINE

## 2020-11-12 PROCEDURE — 94761 N-INVAS EAR/PLS OXIMETRY MLT: CPT

## 2020-11-12 PROCEDURE — 6370000000 HC RX 637 (ALT 250 FOR IP): Performed by: FAMILY MEDICINE

## 2020-11-12 PROCEDURE — 80048 BASIC METABOLIC PNL TOTAL CA: CPT

## 2020-11-12 RX ORDER — DEXTROSE AND SODIUM CHLORIDE 5; .45 G/100ML; G/100ML
INJECTION, SOLUTION INTRAVENOUS CONTINUOUS
Status: DISCONTINUED | OUTPATIENT
Start: 2020-11-12 | End: 2020-11-16

## 2020-11-12 RX ADMIN — GLYCOPYRROLATE AND FORMOTEROL FUMARATE 2 PUFF: 9; 4.8 AEROSOL, METERED RESPIRATORY (INHALATION) at 19:01

## 2020-11-12 RX ADMIN — HYDRALAZINE HYDROCHLORIDE 50 MG: 50 TABLET, FILM COATED ORAL at 09:50

## 2020-11-12 RX ADMIN — FERROUS SULFATE TAB 325 MG (65 MG ELEMENTAL FE) 325 MG: 325 (65 FE) TAB at 09:49

## 2020-11-12 RX ADMIN — FERROUS SULFATE TAB 325 MG (65 MG ELEMENTAL FE) 325 MG: 325 (65 FE) TAB at 18:52

## 2020-11-12 RX ADMIN — DEXTROSE AND SODIUM CHLORIDE: 5; 450 INJECTION, SOLUTION INTRAVENOUS at 09:47

## 2020-11-12 RX ADMIN — FUROSEMIDE 40 MG: 40 TABLET ORAL at 09:51

## 2020-11-12 RX ADMIN — METRONIDAZOLE 500 MG: 500 INJECTION, SOLUTION INTRAVENOUS at 16:20

## 2020-11-12 RX ADMIN — TAMSULOSIN HYDROCHLORIDE 0.4 MG: 0.4 CAPSULE ORAL at 20:24

## 2020-11-12 RX ADMIN — METRONIDAZOLE 500 MG: 500 INJECTION, SOLUTION INTRAVENOUS at 23:54

## 2020-11-12 RX ADMIN — FOLIC ACID 1 MG: 1 TABLET ORAL at 09:51

## 2020-11-12 RX ADMIN — METRONIDAZOLE 500 MG: 500 INJECTION, SOLUTION INTRAVENOUS at 07:20

## 2020-11-12 RX ADMIN — CITALOPRAM 30 MG: 20 TABLET, FILM COATED ORAL at 09:49

## 2020-11-12 RX ADMIN — MULTIPLE VITAMINS W/ MINERALS TAB 1 TABLET: TAB at 09:49

## 2020-11-12 RX ADMIN — ALLOPURINOL 500 MG: 300 TABLET ORAL at 09:49

## 2020-11-12 RX ADMIN — ATORVASTATIN CALCIUM 40 MG: 40 TABLET, FILM COATED ORAL at 20:24

## 2020-11-12 RX ADMIN — ARIPIPRAZOLE 15 MG: 15 TABLET ORAL at 09:49

## 2020-11-12 RX ADMIN — BUSPIRONE HYDROCHLORIDE 10 MG: 10 TABLET ORAL at 09:50

## 2020-11-12 RX ADMIN — GABAPENTIN 400 MG: 400 CAPSULE ORAL at 09:50

## 2020-11-12 RX ADMIN — MODAFINIL 100 MG: 100 TABLET ORAL at 09:48

## 2020-11-12 RX ADMIN — GLYCOPYRROLATE AND FORMOTEROL FUMARATE 2 PUFF: 9; 4.8 AEROSOL, METERED RESPIRATORY (INHALATION) at 08:18

## 2020-11-12 RX ADMIN — GABAPENTIN 400 MG: 400 CAPSULE ORAL at 20:24

## 2020-11-12 RX ADMIN — MAGNESIUM SULFATE HEPTAHYDRATE 2 G: 40 INJECTION, SOLUTION INTRAVENOUS at 11:32

## 2020-11-12 RX ADMIN — POTASSIUM CHLORIDE 40 MEQ: 1500 TABLET, EXTENDED RELEASE ORAL at 11:32

## 2020-11-12 RX ADMIN — HYDRALAZINE HYDROCHLORIDE 50 MG: 50 TABLET, FILM COATED ORAL at 20:24

## 2020-11-12 RX ADMIN — BUSPIRONE HYDROCHLORIDE 10 MG: 10 TABLET ORAL at 20:24

## 2020-11-12 RX ADMIN — CEFTRIAXONE SODIUM 1 G: 1 INJECTION, POWDER, FOR SOLUTION INTRAMUSCULAR; INTRAVENOUS at 21:19

## 2020-11-12 RX ADMIN — PANTOPRAZOLE SODIUM 40 MG: 40 TABLET, DELAYED RELEASE ORAL at 04:31

## 2020-11-12 NOTE — PROGRESS NOTES
Hospitalist Progress Note      Patient:  [facility]    Unit/Bed:5K-22/022-A  YOB: 1970  MRN: [id number]   Acct: [de-identified]   PCP: No primary care provider on file. Date of Admission: 11/7/2020    Assessment/Plan:    1. Perirectal ulceration with bleeding. Flexiseal likely contributed. - surgery seen, discussed with ID. No surgical plans. ID signed off.   - CT abd shows colitis  - continue iv abx Rocephin and Flagyl.  - holding asa and Lovenox  -Continue zinc oxide ointment and cleaning of the perianal area after bowel movements.  -Wound/ostomy consulted. -GI consulted for rectal ulceration reevaluation, my need scope. Awaiting consult.      2. Chronic Resp failure/KIARA  - use bipap at night, and during naps. - avoid sedative meds  - continue modafinil  - continuous pulse ox       3. Urinary retention   - mason in place. -OP follow up with Urology.      4. S/p sepsis/covid 19 pna/ prolonged hosp stay    5. S/p Dysphagia    6. IDDM. BS trend remains acceptable. - continue home meds, Hypoglycemia protocol, ac&hs checks    7. Morbid obesity: BMI 43.7    8. Hypokalemia, replacing again today for 3.3. Mag 1.5, replacing     9. Hypernatremia, 147. Change IVF to D5.45. Monitor. 10. Macrocytic anemia. Hbg stable at 9.7.        Dispo: Awaiting GI recs      Chief Complaint: Rectal bleed    Initial H and P:-    **From Chart Review:  \"51 y.o. male with multiple co-morbidities, with a prolonged hospitalization with covid pna, and multiple issues, discharged on 11/7 to [facility] who presented to [facility] with rectal bleeding. Patient had similar issues while admitted but at the [facility], patient was having rectal bleed, drainage and pain in the rectum. Per ER, pt has swelling in the area, discharge and redness. Case was discussed with general surgery who recommend admission and consult to surgery.  Patients labs were not concerning and actually better than when he was discharged. His hgb is improved as well. Pt was given rocephin and flagyl in the ER, and admitted to the floor with consults to Surgery. \"    Subjective (past 24 hours): Complaints of\" light pain is his butt\". Appetite has been poor. No nausea or vomiting.          Medications:  Reviewed    Infusion Medications    dextrose 5 % and 0.45 % NaCl 75 mL/hr at 11/12/20 0947    dextrose       Scheduled Medications    sodium chloride flush  10 mL Intravenous 2 times per day    [Held by provider] enoxaparin  40 mg Subcutaneous Daily    allopurinol  500 mg Oral Daily    ARIPiprazole  15 mg Oral Daily    atorvastatin  40 mg Oral Nightly    busPIRone  10 mg Oral BID    citalopram  30 mg Oral Daily    ferrous sulfate  325 mg Oral BID WC    folic acid  1 mg Oral Daily    furosemide  40 mg Oral Daily    gabapentin  400 mg Oral BID    glycopyrrolate-formoterol  2 puff Inhalation BID    hydrALAZINE  50 mg Oral BID    [Held by provider] insulin glargine  10 Units Subcutaneous Nightly    modafinil  100 mg Oral Daily    therapeutic multivitamin-minerals  1 tablet Oral Daily    pantoprazole  40 mg Oral Daily    senna  1 tablet Oral BID    tamsulosin  0.4 mg Oral Nightly    insulin lispro  0-18 Units Subcutaneous TID WC    insulin lispro  0-9 Units Subcutaneous Nightly    polyethylene glycol  17 g Oral Every Other Day    cefTRIAXone (ROCEPHIN) IV  1 g Intravenous Q24H    metroNIDAZOLE  500 mg Intravenous Q8H     PRN Meds: sodium chloride flush, acetaminophen **OR** acetaminophen, polyethylene glycol, promethazine **OR** ondansetron, magnesium sulfate, potassium chloride **OR** potassium alternative oral replacement **OR** potassium chloride, albuterol sulfate HFA, benzocaine, guaiFENesin, ondansetron, glucose, dextrose, glucagon (rDNA), dextrose      Intake/Output Summary (Last 24 hours) at 11/12/2020 1011  Last data filed at 11/11/2020 2252  Gross per 24 hour   Intake 700 ml Output --   Net 700 ml       Diet:  DIET CARB CONTROL; Dietary Nutrition Supplements: Low Calorie High Protein Supplement  Dietary Nutrition Supplements: Wound Healing Oral Supplement    Exam:  /77   Pulse 75   Temp 98.4 °F (36.9 °C) (Oral)   Resp 18   Ht 5' 7\" (1.702 m)   Wt 279 lb (126.6 kg)   SpO2 100%   BMI 43.70 kg/m²     General appearance: Alert and appropriate, pleasant morbidly obese adult male. No apparent distress, appears stated age and cooperative. HEENT: Pupils equal, round, and reactive to light. Conjunctivae/corneas clear. Neck: Supple, with full range of motion. No jugular venous distention. Trachea midline. Respiratory:  Normal respiratory effort. Clear to auscultation, bilaterally without Rales/Wheezes/Rhonchi. Cardiovascular: Regular rate and rhythm with normal S1/S2 without murmurs, rubs or gallops. Abdomen: Soft, non-tender, non-distended with normal bowel sounds. Multiple episodes of liquid stool noted. Musculoskeletal: Passive and active ROM x 4 extremities. Skin: Skin color, texture, turgor normal.  No rashes or lesions. Neurologic:  Neurovascularly intact without any focal sensory/motor deficits. Psychiatric: Alert and oriented, engaged in conversation appropriately. Thought content reasonable, decent insight  Capillary Refill: Brisk,< 3 seconds   Peripheral Pulses: +2 palpable, equal bilaterally     Labs:   Recent Labs     11/10/20  0648 11/11/20  0703 11/12/20  0849   WBC 8.8 7.9 8.2   HGB 10.5* 10.1* 9.7*   HCT 35.0* 33.3* 32.5*    336 326     Recent Labs     11/10/20  0648 11/11/20  0703 11/12/20  0744    146* 147*   K 3.6 3.1* 3.3*    106 106   CO2 24 24 26   BUN 4* 3* 5*   CREATININE 1.2 1.1 1.1   CALCIUM 9.1 9.0 9.4     No results for input(s): AST, ALT, BILIDIR, BILITOT, ALKPHOS in the last 72 hours. No results for input(s): INR in the last 72 hours. No results for input(s): Verzacha Zain in the last 72 hours.     Microbiology: Blood culture #1:   Lab Results   Component Value Date    BC No growth-preliminary No growth  10/28/2020       Blood culture #2:No results found for: Alli Vickers    Organism:  Lab Results   Component Value Date    ORG Staphylococcus (coagulase negative) 10/22/2020         Lab Results   Component Value Date    LABGRAM  10/12/2020     Moderate segmented neutrophils observed. Rare epithelial cells observed. Rare gram positive cocci occurring singly and in pairs. MRSA culture only:No results found for: [facility]    Urine culture:   Lab Results   Component Value Date    Yasmine Chiquito  10/06/2020     At least one of drugs in current antibiotic therapy is ineffective in vitro for isolate. LABURIN [place] count: >100,000 CFU/mL   10/06/2020       Respiratory culture: No results found for: CULTRESP    Aerobic and Anaerobic :  No results found for: LABAERO  No results found for: LABANAE    Urinalysis:      Lab Results   Component Value Date    NITRU NEGATIVE 11/03/2020    WBCUA 25-50 11/03/2020    BACTERIA FEW 11/03/2020    RBCUA 3-5 11/03/2020    BLOODU NEGATIVE 11/03/2020    SPECGRAV >=1.030 10/06/2020    GLUCOSEU NEGATIVE 11/03/2020       Radiology:  CT ABDOMEN PELVIS W IV CONTRAST Additional Contrast? None   Final Result      1. Extensive thickening is seen in the region of the rectum. This is likely on the basis of colitis. 2. Persistence of extensive calcifications in the fascial planes throughout the abdomen and pelvis. This could be on the basis of dermatomyositis. 3. Hepatic steatosis. 4. There is a 3 mm pulmonary nodule near the right lung base. Follow-up is recommended in 6-12 months to assess for stability. **This report has been created using voice recognition software. It may contain minor errors which are inherent in voice recognition technology. **      Final report electronically signed by Dr Jim Rider on 11/7/2020 8:17 PM        Ct Abdomen Pelvis W Iv Contrast Additional Contrast? abdomen and pelvis. This could be on the basis of dermatomyositis. 3. Hepatic steatosis. 4. There is a 3 mm pulmonary nodule near the right lung base. Follow-up is recommended in 6-12 months to assess for stability. **This report has been created using voice recognition software. It may contain minor errors which are inherent in voice recognition technology. ** Final report electronically signed by Dr Joseph Meyer on 11/7/2020 8:17 PM      **This report has been created using voice recognition software. It may contain minor errors which are inherent in voice recognition technology. **  Electronically signed by SANDRA Crnae CNP on 11/12/2020 at 10:11 AM

## 2020-11-12 NOTE — CARE COORDINATION
11/12/20, 5:34 PM EST    DISCHARGE PLANNING EVALUATION      SW received a call from [facility] with ECF admissions, they could not start the pre-cert today, no updated therapy notes.  DASIA left message for unit SW

## 2020-11-12 NOTE — PLAN OF CARE
Problem: Impaired respiratory status  Goal: Clear lung sounds  Description: Clear lung sounds  Outcome: Ongoing  Note: Mdi to maintain open airways

## 2020-11-12 NOTE — PROGRESS NOTES
[facility]  INPATIENT OCCUPATIONAL THERAPY  [facility] ONC MED 5K  EVALUATION    Time:   Time In:   Time Out: 1825  Timed Code Treatment Minutes: 25 Minutes  Minutes: 40          Date: 2020  Patient Name: Joel Louie,   Gender: male      MRN: [id number]  : 1970  (44 y.o.)  Referring Practitioner: Storm Councilman, APRN-CNP  Diagnosis: Perirectal Abscess  Additional Pertinent Hx: Pt with multiple co-morbidities, with a prolonged hospitalization with covid pna, and multiple issues, discharged on  to Count includes the [facility] who presented to [facility] with rectal bleeding. Patient had similar issues while admitted but at the [facility], patient was having rectal bleed, drainage and pain in the rectum. Per ER, pt has swelling in the area, discharge and redness. Case was discussed with general surgery who recommend admission and consult to surgery. Patients labs were not concerning and actually better than when he was discharged. His hgb is improved as well. Pt was given rocephin and flagyl in the ER    Restrictions/Precautions:  Restrictions/Precautions: Fall Risk    Subjective  Chart Reviewed: Yes, Orders, History and Physical  Patient assessed for rehabilitation services?: Yes    Subjective: Pleasant and cooperative  Comments: RN approved session. Pt reports moderate to severe pain in his bottom and asked to do exercises and other activities while in the bed at this time. Pt stated that he had been sitting in the chair earlier today.     Pain:  Pain Assessment  Patient Currently in Pain: Yes  Pain Assessment: 0-10  Pain Level: 6  Pain Type: Acute pain  Pain Location: Foot  Pain Orientation: Left  Pain Descriptors: Aching  Pain Frequency: Intermittent  Clinical Progression: Gradually worsening  Patient's Stated Pain Goal: 3  Response to Pain Intervention: Patient Satisfied  Multiple Pain Sites: Yes(chronic pain in his bottom area- pt is positioned on pillows to help relieve pressure on his bottom. He used a waffle cushion while sitting in the chair earlier today.)    Social/Functional History:  Lives With: Alone  Type of Home: Facility([facility])  Home Layout: One level  Home Access: Level entry  Home Equipment: [address], Rolling walker   Bathroom Shower/Tub: Walk-in shower  Bathroom Toilet: Bedside commode  Bathroom Accessibility: Accessible    Receives Help From: Other (comment)(staff as available)  ADL Assistance: Needs assistance  Homemaking Assistance: Needs assistance  Homemaking Responsibilities: No  Transfer Assistance: Needs assistance    Active : No  Patient's  Info: facility has been providing transportation  Leisure & Hobbies: Watching TV,  Additional Comments: Pt has been in and out of the hospital last couple months, requires +2 to stand with therapy, not ambulating currently; pt states staff has been using megan stedy; prior to Sept was living I'ly in an apt. Assist needed for doing self care. Cognition/Orientation:  Overall Orientation Status: Within Normal Limits  Overall Cognitive Status: Exceptions  Cognition Comment: Cues needed for problem solving his current situation. Pt asked questions regarding how he can get his rectum to heal without having to go through surgery. Pt was encouraged to ask his doctors about what they think are his options. Pt was also encouraged to do what he can to get stronger and to have good nutrition to promote healing. ADL's:  Feeding: Setup(pt had help with preparing his Oracio drink and applying spices to his food)  Additional Comments: No other ADLs at this time secondary to pt refusal.  Vision - Basic Assessment  Prior Vision: Wears glasses only for reading    Functional Mobility:  Bed mobility  Comment: Pt was assisted by mechanical draw sheet to get boosted up in the bed. Functional Mobility  Functional Mobility Comments: Not able to demonstrate.      Balance:  Balance  Sitting Balance: Unable to assess(comment)  Standing Balance: Unable to assess(comment)  Standing Balance  Time: not applicable  Comment: Pt did not sit up at the edge of bed for assessing balance. Transfers:  Transfer Comments: Pt had used a Luis M Anita to get into the chair today. Upper Extremity Assessment:Hand Dominance: Right  LUE AROM : WNL  Left Hand AROM: WNL  RUE AROM : WNL  Right Hand AROM: WNL    LUE Strength  L Hand General: 4-/5  LUE Strength Comment: 3+/5 deltoid; 3+/5 pectoral; 4-/5 biceps/triceps  RUE Strength  R Hand General: 4/5  RUE Strength Comment: 3+/5 deltoid; 3+/5 pectoral; 4-/5 biceps/triceps      Sensation  Overall Sensation Status: Impaired(numbness throughout each foot)  Fine Motor Skills  Fine Motor Comment: Pt could feed himself and hold onto utensils without difficulty. Help provided for small tasks such as opening packets of spices on the tray. Activity Tolerance: Patient limited by pain, Patient limited by fatigue  Pt needed rest breaks between sets of the exercises. He did report pain in his LLE and also his bottock. Gentle stretching completed for his B heel cords was provided and pt was elevated in the bed so that he could eat supper. Assessment:  Assessment: Patient would benefit from continued skilled OT services to address above deficits. He presents with perirectal abscess. He has had 2 previous hospitizations in the past 2 months. Pt had been living in an apartment prior to September. He had been getting set up to receive visiting nurses. Pt was at [facility] prior to this admission. He had help with transfers but has not been able to ambulate in several weeks, per pt. Pt demonstrated doing arm and leg exercises at this time. He was able to participate with rest breaks taken between sets. Pt requires skilled OT services to assist pt with progressing toward his goal of returning to living in his own apartment when he has healed his abscess.     Performance deficits / Impairments: Decreased functional mobility , Decreased ADL status, Decreased endurance, Decreased strength  Prognosis: Good  Decision Making: Medium Complexity    Treatment Initiated: Treatment and education initiated within context of evaluation. Evaluation time included review of current medical information, gathering information related to past medical, social and functional history, completion of standardized testing, formal and informal observation of tasks, assessment of data and development of plan of care and goals. Treatment time included skilled education and facilitation of tasks to increase safety and independence with ADL's for improved functional independence and quality of life. Patient completed BUE strengthening exercises x 8 reps x 3 sets this date with a moderate resistance stretch band for shoulder horizontal abduction, horizontal adduction and internal rotation and elbow extension  in order to increase strength and improve activity tolerance required for functional toilet and shower transfers and ADL routine. Patient tolerated fair. Pt also completed BLE ROM and isometric exercises as well as a hamstring stretch: hip adduction/abduction, internal rotation with knee flexion, heel slides, ankle pumps, x 10 reps each and 10 reps of a slow knee press/release and 4 reps of he hamstring stretch. Tightness noted in hamstrings of BLE. Exercises completed to increase pt's strength and decrease muscle tightness for ease of doing functional mobility. Pt asked about his wound healing and he was encouraged to do the exercises so that he can get strong enough to move more regularly. Also encouraged pt to have good nutrition so that he can promote good blood flow to the area of the abscess. Pt stated that his nurses have been helping him to take weight off of his buttocks throughout the day.   Discharge Recommendations:  Continue to assess pending progress, ECF with OT    Patient Education:  OT Education: OT Role, Plan of Care, Home Exercise Program  Patient Education: Ways in which he can promote the healing of the wound in his bottom. Equipment Recommendations:  Equipment Needed: No  Other: Will continue to monitor    Plan:  Times per week: 3-5x  Current Treatment Recommendations: Strengthening, Functional Mobility Training, Endurance Training, Self-Care / ADL  Plan Comment: Pt would benefit from continued skilled OT services when medically stable and discharged from Acute. OT recommended while at [facility]. Specific instructions for Next Treatment: Functional transfers as able; ADLs while sitting upright; arm and leg exercises    Goals:  Patient goals : \"I want to get stronger and heal my bottom so that I can return home again. \" pt states. Short term goals  Time Frame for Short term goals: By discharge  Short term goal 1: Pt will complete BUE ROM/moderate resistance exercises x 5 sets of 10 reps each to increase his strength and endurance for ease of doing self care and functional transfers. Short term goal 2: Pt will complete functional transfers with OTR to prepare for doing self care while out of bed. See long-term goal time frame for expected duration of plan of care. If no long-term goals established, a short length of stay is anticipated. Following session, patient left in safe position with all fall risk precautions in place.

## 2020-11-12 NOTE — PROGRESS NOTES
Termination letter from Dr. Sheri Jay faxed to this RN and placed in chart. Dr. Milli Mayo notified again, to see patient.

## 2020-11-12 NOTE — PROGRESS NOTES
Progress note: Infectious diseases    Patient - Sterling Mcclure,  Age - 44 y.o.    - 1970      Room Number - 5K-22/022-A   MRN -  [id number]   Acct # - [de-identified]  Date of Admission -  2020  4:57 PM    SUBJECTIVE:   No new issues  Discussed with GI associate. Informed of his termination of care from the practice  Dr Gume Farris to see patient  OBJECTIVE   VITALS    height is 5' 7\" (1.702 m) and weight is 279 lb (126.6 kg). His temperature is 98 °F (36.7 °C). His blood pressure is 127/77 and his pulse is 90. His respiration is 22 and oxygen saturation is 93%. Wt Readings from Last 3 Encounters:   20 279 lb (126.6 kg)   20 276 lb 1.6 oz (125.2 kg)   09/15/20 281 lb 6.4 oz (127.6 kg)       I/O (24 Hours)    Intake/Output Summary (Last 24 hours) at 2020 1739  Last data filed at 2020 1350  Gross per 24 hour   Intake 3318 ml   Output 2000 ml   Net 1318 ml       General Appearance  Awake, alert, oriented,  Obese. HEENT - normocephalic, atraumatic, pale conjunctiva,  anicteric sclera  Neck - Supple, no mass  Lungs -  Bilateral  air entry, diminished breath sound  Cardiovascular - Heart sounds are normal.     Abdomen - soft, not distended, nontender, perianal open wound  Neurologic - awake and oriented. Skin - No bruising or bleeding  Extremities - chronically swollen legs.     MEDICATIONS:      sodium chloride flush  10 mL Intravenous 2 times per day    [Held by provider] enoxaparin  40 mg Subcutaneous Daily    allopurinol  500 mg Oral Daily    ARIPiprazole  15 mg Oral Daily    atorvastatin  40 mg Oral Nightly    busPIRone  10 mg Oral BID    citalopram  30 mg Oral Daily    ferrous sulfate  325 mg Oral BID WC    folic acid  1 mg Oral Daily    furosemide  40 mg Oral Daily    gabapentin  400 mg Oral BID    glycopyrrolate-formoterol  2 puff Inhalation BID    hydrALAZINE  50 mg Oral BID    [Held by provider] insulin glargine  10 Units Subcutaneous Nightly    modafinil  100 mg Oral Daily    therapeutic multivitamin-minerals  1 tablet Oral Daily    pantoprazole  40 mg Oral Daily    senna  1 tablet Oral BID    tamsulosin  0.4 mg Oral Nightly    insulin lispro  0-18 Units Subcutaneous TID WC    insulin lispro  0-9 Units Subcutaneous Nightly    polyethylene glycol  17 g Oral Every Other Day    cefTRIAXone (ROCEPHIN) IV  1 g Intravenous Q24H    metroNIDAZOLE  500 mg Intravenous Q8H      dextrose 5 % and 0.45 % NaCl 75 mL/hr at 11/12/20 0947    dextrose       sodium chloride flush, acetaminophen **OR** acetaminophen, polyethylene glycol, promethazine **OR** ondansetron, magnesium sulfate, potassium chloride **OR** potassium alternative oral replacement **OR** potassium chloride, albuterol sulfate HFA, benzocaine, guaiFENesin, ondansetron, glucose, dextrose, glucagon (rDNA), dextrose      LABS:     CBC:   Recent Labs     11/10/20  0648 11/11/20  0703 11/12/20  0849   WBC 8.8 7.9 8.2   HGB 10.5* 10.1* 9.7*    336 326     BMP:    Recent Labs     11/10/20  0648 11/11/20  0703 11/12/20  0744    146* 147*   K 3.6 3.1* 3.3*    106 106   CO2 24 24 26   BUN 4* 3* 5*   CREATININE 1.2 1.1 1.1   GLUCOSE 84 83 94     Calcium:  Recent Labs     11/12/20  0744   CALCIUM 9.4     Ionized Calcium:No results for input(s): IONCA in the last 72 hours. Magnesium:  Recent Labs     11/12/20  0744   MG 1.5*     Phosphorus:No results for input(s): PHOS in the last 72 hours. BNP:No results for input(s): BNP in the last 72 hours. Glucose:  Recent Labs     11/12/20  0826 11/12/20  1150 11/12/20  1637   POCGLU 91 126* 120*     HgbA1C: No results for input(s): LABA1C in the last 72 hours. INR:   No results for input(s): INR in the last 72 hours. Hepatic:   No results for input(s): ALKPHOS, ALT, AST, PROT, BILITOT, BILIDIR, LABALBU in the last 72 hours.   Amylase and Lipase:  No results for input(s): LACTA, AMYLASE in the last 72 hours. Lactic Acid:   No results for input(s): LACTA in the last 72 hours. Troponin: No results for input(s): CKTOTAL, CKMB, TROPONINI in the last 72 hours. BNP: No results for input(s): BNP in the last 72 hours. CULTURES:   UA: No results for input(s): SPECGRAV, PHUR, COLORU, CLARITYU, MUCUS, PROTEINU, BLOODU, RBCUA, WBCUA, BACTERIA, NITRU, GLUCOSEU, BILIRUBINUR, UROBILINOGEN, KETUA, LABCAST, LABCASTTY, AMORPHOS in the last 72 hours. Invalid input(s): CRYSTALS  Micro:   Lab Results   Component Value Date    BC No growth-preliminary No growth  10/28/2020          Problem list of patient:     Patient Active Problem List   Diagnosis Code    History of atrial fibrillation Z86.79    BPH (benign prostatic hyperplasia) N40.0    Carpal tunnel syndrome on right G56.01    Chronic gout M1A. 9XX0    DM2 (diabetes mellitus, type 2) ([facility] [address] E11.9    Heart failure with preserved ejection fraction (HCC) I50.30    History of alcohol abuse F10.11    History of osteomyelitis Z87.39    Hypogonadism, male E29.1    Major depression F32.9    Morbid obesity (HCC) E66.01    DURAN (nonalcoholic steatohepatitis) K75.81    KIARA treated with BiPAP G47.33    Vitamin D deficiency E55.9    Normocytic anemia D64.9    Physical deconditioning R53.81    Mood disorder (HCC) F39    Hallucinations R44.3    GERD (gastroesophageal reflux disease) K21.9    Wound of buttock S31.809A    Primary osteoarthritis of left hip M16.12    Acute on chronic anemia D64.9    Dysphagia R13.10    Hypertension, essential I10    Dyslipidemia E78.5    Aortic stenosis, mild to moderate I35.0    Perirectal abscess K61.1         ASSESSMENT/PLAN   Rectal bleed due to ulceration: better   Perianal wound: continue zinc oxide ointment  No ID issues at this time   call if needed     Larna Galeazzi, MD, FACP 11/12/2020 5:39 PM

## 2020-11-13 ENCOUNTER — APPOINTMENT (OUTPATIENT)
Dept: GENERAL RADIOLOGY | Age: 50
DRG: 919 | End: 2020-11-13
Payer: MEDICARE

## 2020-11-13 LAB
ANION GAP SERPL CALCULATED.3IONS-SCNC: 14 MEQ/L (ref 8–16)
BUN BLDV-MCNC: 5 MG/DL (ref 7–22)
CALCIUM SERPL-MCNC: 9.3 MG/DL (ref 8.5–10.5)
CHLORIDE BLD-SCNC: 103 MEQ/L (ref 98–111)
CO2: 28 MEQ/L (ref 23–33)
CREAT SERPL-MCNC: 1.1 MG/DL (ref 0.4–1.2)
ERYTHROCYTE [DISTWIDTH] IN BLOOD BY AUTOMATED COUNT: 16 % (ref 11.5–14.5)
ERYTHROCYTE [DISTWIDTH] IN BLOOD BY AUTOMATED COUNT: 57.2 FL (ref 35–45)
GFR SERPL CREATININE-BSD FRML MDRD: 85 ML/MIN/1.73M2
GLUCOSE BLD-MCNC: 115 MG/DL (ref 70–108)
GLUCOSE BLD-MCNC: 115 MG/DL (ref 70–108)
GLUCOSE BLD-MCNC: 116 MG/DL (ref 70–108)
GLUCOSE BLD-MCNC: 133 MG/DL (ref 70–108)
GLUCOSE BLD-MCNC: 150 MG/DL (ref 70–108)
HCT VFR BLD CALC: 34.1 % (ref 42–52)
HEMOGLOBIN: 10.2 GM/DL (ref 14–18)
MAGNESIUM: 1.8 MG/DL (ref 1.6–2.4)
MCH RBC QN AUTO: 29.2 PG (ref 26–33)
MCHC RBC AUTO-ENTMCNC: 29.9 GM/DL (ref 32.2–35.5)
MCV RBC AUTO: 97.7 FL (ref 80–94)
PLATELET # BLD: 327 THOU/MM3 (ref 130–400)
PMV BLD AUTO: 10.9 FL (ref 9.4–12.4)
POTASSIUM REFLEX MAGNESIUM: 3.3 MEQ/L (ref 3.5–5.2)
RBC # BLD: 3.49 MILL/MM3 (ref 4.7–6.1)
SODIUM BLD-SCNC: 145 MEQ/L (ref 135–145)
WBC # BLD: 8.2 THOU/MM3 (ref 4.8–10.8)

## 2020-11-13 PROCEDURE — 94761 N-INVAS EAR/PLS OXIMETRY MLT: CPT

## 2020-11-13 PROCEDURE — 6370000000 HC RX 637 (ALT 250 FOR IP): Performed by: FAMILY MEDICINE

## 2020-11-13 PROCEDURE — 99232 SBSQ HOSP IP/OBS MODERATE 35: CPT | Performed by: NURSE PRACTITIONER

## 2020-11-13 PROCEDURE — 94660 CPAP INITIATION&MGMT: CPT

## 2020-11-13 PROCEDURE — 2580000003 HC RX 258: Performed by: FAMILY MEDICINE

## 2020-11-13 PROCEDURE — 2500000003 HC RX 250 WO HCPCS: Performed by: FAMILY MEDICINE

## 2020-11-13 PROCEDURE — 97110 THERAPEUTIC EXERCISES: CPT

## 2020-11-13 PROCEDURE — 83735 ASSAY OF MAGNESIUM: CPT

## 2020-11-13 PROCEDURE — 1200000003 HC TELEMETRY R&B

## 2020-11-13 PROCEDURE — 6360000002 HC RX W HCPCS: Performed by: FAMILY MEDICINE

## 2020-11-13 PROCEDURE — 2580000003 HC RX 258: Performed by: NURSE PRACTITIONER

## 2020-11-13 PROCEDURE — 36415 COLL VENOUS BLD VENIPUNCTURE: CPT

## 2020-11-13 PROCEDURE — 97530 THERAPEUTIC ACTIVITIES: CPT

## 2020-11-13 PROCEDURE — 82948 REAGENT STRIP/BLOOD GLUCOSE: CPT

## 2020-11-13 PROCEDURE — 94640 AIRWAY INHALATION TREATMENT: CPT

## 2020-11-13 PROCEDURE — 80048 BASIC METABOLIC PNL TOTAL CA: CPT

## 2020-11-13 PROCEDURE — 85027 COMPLETE CBC AUTOMATED: CPT

## 2020-11-13 PROCEDURE — 71046 X-RAY EXAM CHEST 2 VIEWS: CPT

## 2020-11-13 PROCEDURE — 2700000000 HC OXYGEN THERAPY PER DAY

## 2020-11-13 RX ORDER — 0.9 % SODIUM CHLORIDE 0.9 %
500 INTRAVENOUS SOLUTION INTRAVENOUS ONCE
Status: COMPLETED | OUTPATIENT
Start: 2020-11-13 | End: 2020-11-13

## 2020-11-13 RX ADMIN — GLYCOPYRROLATE AND FORMOTEROL FUMARATE 2 PUFF: 9; 4.8 AEROSOL, METERED RESPIRATORY (INHALATION) at 08:30

## 2020-11-13 RX ADMIN — FERROUS SULFATE TAB 325 MG (65 MG ELEMENTAL FE) 325 MG: 325 (65 FE) TAB at 09:32

## 2020-11-13 RX ADMIN — MULTIPLE VITAMINS W/ MINERALS TAB 1 TABLET: TAB at 09:31

## 2020-11-13 RX ADMIN — SODIUM CHLORIDE, PRESERVATIVE FREE 10 ML: 5 INJECTION INTRAVENOUS at 09:39

## 2020-11-13 RX ADMIN — CITALOPRAM 30 MG: 20 TABLET, FILM COATED ORAL at 09:31

## 2020-11-13 RX ADMIN — GLYCOPYRROLATE AND FORMOTEROL FUMARATE 2 PUFF: 9; 4.8 AEROSOL, METERED RESPIRATORY (INHALATION) at 21:40

## 2020-11-13 RX ADMIN — FUROSEMIDE 40 MG: 40 TABLET ORAL at 09:31

## 2020-11-13 RX ADMIN — BUSPIRONE HYDROCHLORIDE 10 MG: 10 TABLET ORAL at 21:35

## 2020-11-13 RX ADMIN — METRONIDAZOLE 500 MG: 500 INJECTION, SOLUTION INTRAVENOUS at 21:46

## 2020-11-13 RX ADMIN — ARIPIPRAZOLE 15 MG: 15 TABLET ORAL at 09:32

## 2020-11-13 RX ADMIN — GABAPENTIN 400 MG: 400 CAPSULE ORAL at 09:31

## 2020-11-13 RX ADMIN — ALLOPURINOL 500 MG: 300 TABLET ORAL at 09:31

## 2020-11-13 RX ADMIN — FERROUS SULFATE TAB 325 MG (65 MG ELEMENTAL FE) 325 MG: 325 (65 FE) TAB at 17:16

## 2020-11-13 RX ADMIN — GABAPENTIN 400 MG: 400 CAPSULE ORAL at 21:35

## 2020-11-13 RX ADMIN — SODIUM CHLORIDE, PRESERVATIVE FREE 10 ML: 5 INJECTION INTRAVENOUS at 21:38

## 2020-11-13 RX ADMIN — HYDRALAZINE HYDROCHLORIDE 50 MG: 50 TABLET, FILM COATED ORAL at 09:32

## 2020-11-13 RX ADMIN — STANDARDIZED SENNA CONCENTRATE 8.6 MG: 8.6 TABLET ORAL at 21:37

## 2020-11-13 RX ADMIN — STANDARDIZED SENNA CONCENTRATE 8.6 MG: 8.6 TABLET ORAL at 09:31

## 2020-11-13 RX ADMIN — FOLIC ACID 1 MG: 1 TABLET ORAL at 09:31

## 2020-11-13 RX ADMIN — ATORVASTATIN CALCIUM 40 MG: 40 TABLET, FILM COATED ORAL at 21:35

## 2020-11-13 RX ADMIN — BUSPIRONE HYDROCHLORIDE 10 MG: 10 TABLET ORAL at 09:32

## 2020-11-13 RX ADMIN — SODIUM CHLORIDE 500 ML: 9 INJECTION, SOLUTION INTRAVENOUS at 17:16

## 2020-11-13 RX ADMIN — CEFTRIAXONE SODIUM 1 G: 1 INJECTION, POWDER, FOR SOLUTION INTRAMUSCULAR; INTRAVENOUS at 21:29

## 2020-11-13 RX ADMIN — METRONIDAZOLE 500 MG: 500 INJECTION, SOLUTION INTRAVENOUS at 09:53

## 2020-11-13 RX ADMIN — HYDRALAZINE HYDROCHLORIDE 50 MG: 50 TABLET, FILM COATED ORAL at 21:35

## 2020-11-13 RX ADMIN — MODAFINIL 100 MG: 100 TABLET ORAL at 09:49

## 2020-11-13 RX ADMIN — PANTOPRAZOLE SODIUM 40 MG: 40 TABLET, DELAYED RELEASE ORAL at 05:04

## 2020-11-13 ASSESSMENT — PAIN SCALES - GENERAL
PAINLEVEL_OUTOF10: 2
PAINLEVEL_OUTOF10: 3
PAINLEVEL_OUTOF10: 3

## 2020-11-13 NOTE — PROGRESS NOTES
Emanuel Minaya 58  OCCUPATIONAL THERAPY MISSED TREATMENT NOTE  [facility] 5K  [id number]      Date: 2020  Patient Name: Brenda Skaggs        CSN: [id number]   : 1970  (44 y.o.)  Gender: male   Referring Practitioner: BHUPINDER Gallegos  Diagnosis: Perirectal Abscess         REASON FOR MISSED TREATMENT: Attempt x 1, Pt leaving for chest x-ray anytime. Nurse requests therapist check back later.

## 2020-11-13 NOTE — ADT AUTH CERT
Utilization Reviews         Gastroenterology [facility] - Care Day 7 (11/13/2020) by Kellie Caro RN         Review Status  Review Entered    Completed  11/13/2020 15:15        Criteria Review       Care Day: 7 Care Date: 11/13/2020 Level of Care:    Guideline Day 3    Level Of Care    ( ) * Activity level acceptable    ( ) * Complete discharge planning    Clinical Status    ( ) * Hemodynamic stability    11/13/2020 3:14 PM EST by Sindy Garrett      /67   Pulse 77   Temp 97.8 °F (36.6 °C) (Oral)   Resp 14   Ht 5' 7\" (1.702 m)   Wt 279 lb (126.6 kg)   SpO2 93%   BMI 43.70 kg/m²    ( ) * Abdominal status acceptable    ( ) * Pain and nausea absent or adequately managed    11/13/2020 3:15 PM EST by Sindy Garrett      Appetite has greatly improved since yesterday. No n/v or abdominal pain. Complaints of pain around mason catheter at insertion around urethra. Complaints of feeling dizzy and SOB when he moves    ( ) * Temperature status acceptable    ( ) * Intestinal status acceptable    ( ) * Hepatic and biliary abnormalities absent or acceptable    ( ) * General Discharge Criteria met    Interventions    ( ) * No inpatient interventions needed    11/13/2020 3:14 PM EST by Sindy Garrett      ALLOPURINOL DAILY, ABILIFY DAILY, BUSPAR BID, ROCEPHIN IV DAILY, CELEXA DAILY, FERROUS SUFATE BID, FOLIC ACID DAILY, LASIX PO DAILY, NEURONTIN BID, BEVESPI BID, HYDRALAZINE BID, FLAGYL IV Q8HR, PROTONIX DAILY, SENOKOT BID    ( ) * Intake acceptable    * Milestone    Additional Notes    Internal Med 11/13/20:       Assessment/Plan:         1. Perirectal ulceration with bleeding. Flexiseal likely contributed. - surgery seen, discussed with ID. No surgical plans. ID signed off.    - CT abd shows colitis    - continue iv abx Rocephin and Flagyl.    - holding asa and Lovenox    -Continue zinc oxide ointment and cleaning of the perianal area after bowel movements.    -Wound/ostomy consulted.     -GI seen, plans for Scheduled Medications    · sodium chloride flush 10 mL Intravenous 2 times per day    · [Held by provider] enoxaparin 40 mg Subcutaneous Daily    · allopurinol 500 mg Oral Daily    · ARIPiprazole 15 mg Oral Daily    · atorvastatin 40 mg Oral Nightly    · busPIRone 10 mg Oral BID    · citalopram 30 mg Oral Daily    · ferrous sulfate 325 mg Oral BID WC    · folic acid 1 mg Oral Daily    · furosemide 40 mg Oral Daily    · gabapentin 400 mg Oral BID    · glycopyrrolate-formoterol 2 puff Inhalation BID    · hydrALAZINE 50 mg Oral BID    · [Held by provider] insulin glargine 10 Units Subcutaneous Nightly    · modafinil 100 mg Oral Daily    · therapeutic multivitamin-minerals 1 tablet Oral Daily    · pantoprazole 40 mg Oral Daily    · senna 1 tablet Oral BID    · tamsulosin 0.4 mg Oral Nightly    · insulin lispro 0-18 Units Subcutaneous TID WC    · insulin lispro 0-9 Units Subcutaneous Nightly    · polyethylene glycol 17 g Oral Every Other Day    · cefTRIAXone (ROCEPHIN) IV 1 g Intravenous Q24H    · metroNIDAZOLE 500 mg Intravenous Q8H          PRN Meds:    PRN Medications    sodium chloride flush, acetaminophen **OR** acetaminophen, polyethylene glycol, promethazine **OR** ondansetron, magnesium sulfate, potassium chloride **OR** potassium alternative oral replacement **OR** potassium chloride, albuterol sulfate HFA, benzocaine, guaiFENesin, ondansetron, glucose, dextrose, glucagon (rDNA), dextrose                 Intake/Output Summary (Last 24 hours) at 11/13/2020 1042    Last data filed at 11/12/2020 2118    Gross per 24 hour    Intake 3803 ml    Output 3850 ml    Net -47 ml              Diet:    DIET CARB CONTROL;     Dietary Nutrition Supplements: Low Calorie High Protein Supplement    Dietary Nutrition Supplements: Wound Healing Oral Supplement         Exam:    /67   Pulse 77   Temp 97.8 °F (36.6 °C) (Oral)   Resp 14   Ht 5' 7\" (1.702 m)   Wt 279 lb (126.6 kg)   SpO2 93%   BMI 43.70 kg/m²         Labs: Recent Labs      11/11/20    0703 11/12/20    0849 11/13/20    0652    WBC 7.9 8.2 8.2    HGB 10.1* 9.7* 10.2*    HCT 33.3* 32.5* 34.1*     326 327         Recent Labs      11/11/20    0703 11/12/20    0744 11/13/20    0652    * 147* 145    K 3.1* 3.3* 3.3*     106 103    CO2 24 26 28    BUN 3* 5* 5*    CREATININE 1.1 1.1 1.1    CALCIUM 9.0 9.4 9.3         No results for input(s): AST, ALT, BILIDIR, BILITOT, ALKPHOS in the last 72 hours. No results for input(s): INR in the last 72 hours. No results for input(s): Les Darlene in the last 72 hours.         Microbiology:      Blood culture #1:    Lab Results    Component Value Date      BC No growth-preliminary No growth 10/28/2020              Blood culture #2:No results found for: April          Organism:    Lab Results    Component Value Date      ORG Staphylococcus (coagulase negative) 10/22/2020            Lab Results    Component Value Date      LABGRAM   10/12/2020        Moderate segmented neutrophils observed. Rare epithelial cells observed.  Rare gram positive cocci occurring singly and in pairs.                MRSA culture only:No results found for: [facility]         Urine culture:    Lab Results    Component Value Date      LABURIN   10/06/2020        At least one of drugs in current antibiotic therapy is ineffective in vitro for isolate.      LABURIN [place] count: >100,000 CFU/mL   10/06/2020              Respiratory culture: No results found for: CULTRESP         Aerobic and Anaerobic :    No results found for: LABAERO    No results found for: LABANAE         Urinalysis:         Lab Results    Component Value Date      NITRU NEGATIVE 11/03/2020      WBCUA 25-50 11/03/2020      BACTERIA FEW 11/03/2020      RBCUA 3-5 11/03/2020      BLOODU NEGATIVE 11/03/2020      SPECGRAV >=1.030 10/06/2020      GLUCOSEU NEGATIVE 11/03/2020              Radiology:    CT ABDOMEN PELVIS W IV CONTRAST Additional Contrast? None    Final Result      1. Extensive thickening is seen in the region of the rectum. This is likely on the basis of colitis. 2. Persistence of extensive calcifications in the fascial planes throughout the abdomen and pelvis. This could be on the basis of dermatomyositis. 3. Hepatic steatosis. 4. There is a 3 mm pulmonary nodule near the right lung base. Follow-up is recommended in 6-12 months to assess for stability.              **This report has been created using voice recognition software. It may contain minor errors which are inherent in voice recognition technology. **         Final report electronically signed by Dr Salvatore Uriostegui on 11/7/2020 8:17 PM              Ct Abdomen Pelvis W Iv Contrast Additional Contrast? None         Result Date: 11/7/2020    PROCEDURE: CT ABDOMEN PELVIS W IV CONTRAST CLINICAL INFORMATION: 57-year-old male with rectal bleeding. Possible fistula. Abdominal pain . COMPARISON: CT scan 10/20/2020. TECHNIQUE: 5 mm axial CT images were obtained through the abdomen and pelvis after the administration of intravenous and oral contrast. Coronal and sagittal reconstructions were obtained. All CT scans at this facility use dose modulation, iterative reconstruction, and/or weight-based dosing when appropriate to reduce radiation dose to as low as reasonably achievable. FINDINGS: Atelectasis is noted at the bilateral lung bases. On axial image #1 there is a 3 mm pulmonary nodule in the anterior right lung. There is no pleural effusion. The base of the heart is within acceptable limits. The liver is hypoattenuated. This may be due to fatty infiltration. The gallbladder, pancreas, adrenal glands and spleen are within normal limits. There are some splenule seen in the left upper quadrant. The kidneys are symmetric in size, shape and degree of enhancement. There is no hydronephrosis. There is no evidence of a small bowel obstruction. There is circumferential wall thickening in the region of the rectum. There is a Dimas catheter within the lumen of the urinary bladder. The aorta and IVC are normal in caliber. Extensive calcifications are again seen in the fascial planes throughout the abdomen and pelvis. This is most notable in the right gluteal region. This finding is similar to the previous exam. Haziness is seen throughout the superficial subcutaneous soft tissues of the ventral abdominal wall. This may be on the basis of previous injections. The osseous structures are intact. There are degenerative changes in the spine. No acute fractures.         1. Extensive thickening is seen in the region of the rectum. This is likely on the basis of colitis. 2. Persistence of extensive calcifications in the fascial planes throughout the abdomen and pelvis. This could be on the basis of dermatomyositis. 3. Hepatic steatosis. 4. There is a 3 mm pulmonary nodule near the right lung base. Follow-up is recommended in 6-12 months to assess for stability. **This report has been created using voice recognition software. It may contain minor errors which are inherent in voice recognition technology. ** Final report electronically signed by Dr Salvatore Uriostegui on 11/7/2020 8:17 PM

## 2020-11-13 NOTE — PROGRESS NOTES
[address] [place] [address]  INPATIENT PHYSICAL THERAPY  DAILY NOTE  [facility] 5K - 5K-22/022-A    Time In: 2662  Time Out: 8219  Timed Code Treatment Minutes: 41 Minutes  Minutes: 41          Date: 2020  Patient Name: Brenda Bray,  Gender:  male        MRN: [id number]  : 1970  (44 y.o.)     Referring Practitioner: Kusum Lee CNP  Diagnosis: Perirectal abscess  Additional Pertinent Hx: 44 y.o. male with multiple co-morbidities, with a prolonged hospitalization with covid pna, and multiple issues, discharged on  to [facility] who presented to [address] [place] [address] with rectal bleeding. Patient had similar issues while admitted but at the [facility], patient was having rectal bleed, drainage and pain in the rectum. Per ER, pt has swelling in the area, discharge and redness. Case was discussed with general surgery who recommend admission and consult to surgery. Patients labs were not concerning and actually better than when he was discharged. His hgb is improved as well. Pt was given rocephin and flagyl in the ER, and admitted to the floor with consults to Surgery. Prior Level of Function:  Lives With: Alone  Type of Home: Facility([facility])  Home Layout: One level  Home Access: Level entry  Home Equipment: Leotha Damme, Rolling walker   Bathroom Shower/Tub: Walk-in shower  Bathroom Toilet: Bedside commode  Bathroom Accessibility: Accessible    Receives Help From: Other (comment)(staff as available)  ADL Assistance: Needs assistance  Homemaking Assistance: Needs assistance  Homemaking Responsibilities: No  Transfer Assistance: Needs assistance  Active : No  Additional Comments: Pt has been in and out of the hospital last couple months, requires +2 to stand with therapy, not ambulating currently; pt states staff has been using megan stedy; prior to Sept was living I'ly in an apt. Assist needed for doing self care.     Restrictions/Precautions:  Restrictions/Precautions: Fall Risk SUBJECTIVE: RN approved session. Pt in bed upon arrival and agrees to therapy. PAIN: LUE iv site- notified RN, IV was leaking as well as cauding pain therefore RN pulled IV. Orthostatics: +  Supine: 104/50  Seated: 95/56  Standin/50  Returned to supine: 116/65  RN present and aware    OBJECTIVE:  Bed Mobility:  Rolling to Left: Minimal Assistance, Moderate Assistance   Rolling to Right: Minimal Assistance, Moderate Assistance   Supine to Sit: Moderate Assistance, with head of bed raised, with verbal cues , with increased time for completion  Sit to Supine: Minimal Assistance, with verbal cues , with increased time for completion   Scooting: Contact Guard Assistance, to EOB in seated position    Transfers:  Sit to Stand: Minimal Assistance, Moderate Assistance, X 2, with increased time for completion, cues for hand placement, with verbal cues  Stand to Sit:Minimal Assistance, X 2  Using LENORA steady    Balance:  Static Sitting Balance:  Stand By Assistance  Static Standing Balance: Contact Guard Assistance, Minimal Assistance  Pt EOB in prep for mobility, pt stood in LENORA steady~ 20 seconds before needing to sit down, completed 3 trials. Exercise:  Patient was guided in 1 set(s) 10 reps of exercise to both lower extremities. Ankle pumps, Glut sets, Quad sets, Heelslides and Hip abduction/adduction. Exercises were completed for increased independence with functional mobility. Functional Outcome Measures: Completed  AM-PAC Inpatient Mobility Raw Score : 10  AM-PAC Inpatient T-Scale Score : 32.29    ASSESSMENT:  Assessment: Patient progressing toward established goals. Activity Tolerance:  Patient tolerance of  treatment: good. Pt tolerated session well. Pt demos decreased strength, endurance, balance and stability on feet.  Pt will benefit from cont PT at this time     Equipment Recommendations:Equipment Needed: No  Discharge Recommendations:  Subacute/Skilled Nursing Facility    Plan: Times per

## 2020-11-13 NOTE — PROGRESS NOTES
Hospitalist Progress Note      Patient:  Brittny Alejandre    Unit/Bed:5K-22/022-A  YOB: 1970  MRN: [id number]   Acct: [de-identified]   PCP: No primary care provider on file. Date of Admission: 11/7/2020    Assessment/Plan:    1. Perirectal ulceration with bleeding. Flexiseal likely contributed. - surgery seen, discussed with ID. No surgical plans. ID signed off.   - CT abd shows colitis  - continue iv abx Rocephin and Flagyl.  - holding asa and Lovenox  -Continue zinc oxide ointment and cleaning of the perianal area after bowel movements.  -Wound/ostomy consulted. -GI seen, plans for colon/EGD this admission.       2. Chronic Resp failure/KIARA  - use bipap at night, and during naps. - avoid sedative meds  - continue modafinil  - continuous pulse ox       3. Urinary retention   - mason in place. -OP follow up with Urology.      4. S/p sepsis/covid 19 pna/ prolonged hosp stay    5. S/p Dysphagia    6. IDDM. BS trend remains acceptable. - Holding home Lantus  - Hypoglycemia protocol, ac&hs checks    7. Morbid obesity: BMI 43.7    8. Hypokalemia, replacing again today for 3.3.     9. Hypomagnesemia. 1.5 11/12 and replaced. 1.8 today. 10. Hypernatremia, resolved. Monitor. 11. Macrocytic anemia. Hbg stable at 10.2. 12. Deconditioning. PT/OT         Chief Complaint: Rectal bleed    Initial H and P:-    **From Chart Review:  \"51 y.o. male with multiple co-morbidities, with a prolonged hospitalization with covid pna, and multiple issues, discharged on 11/7 to [facility] who presented to [address] [place] [address] with rectal bleeding. Patient had similar issues while admitted but at the [facility], patient was having rectal bleed, drainage and pain in the rectum. Per ER, pt has swelling in the area, discharge and redness. Case was discussed with general surgery who recommend admission and consult to surgery.  Patients labs were not concerning and actually better than when he was discharged. His hgb is improved as well. Pt was given rocephin and flagyl in the ER, and admitted to the floor with consults to Surgery. \"    Subjective (past 24 hours): Appetite has greatly improved since yesterday. No n/v or abdominal pain. Complaints of pain around mason catheter at insertion around urethra.  Complaints of feeling dizzy and SOB when he moves      Medications:  Reviewed    Infusion Medications    dextrose 5 % and 0.45 % NaCl 75 mL/hr at 11/12/20 0947    dextrose       Scheduled Medications    sodium chloride flush  10 mL Intravenous 2 times per day    [Held by provider] enoxaparin  40 mg Subcutaneous Daily    allopurinol  500 mg Oral Daily    ARIPiprazole  15 mg Oral Daily    atorvastatin  40 mg Oral Nightly    busPIRone  10 mg Oral BID    citalopram  30 mg Oral Daily    ferrous sulfate  325 mg Oral BID WC    folic acid  1 mg Oral Daily    furosemide  40 mg Oral Daily    gabapentin  400 mg Oral BID    glycopyrrolate-formoterol  2 puff Inhalation BID    hydrALAZINE  50 mg Oral BID    [Held by provider] insulin glargine  10 Units Subcutaneous Nightly    modafinil  100 mg Oral Daily    therapeutic multivitamin-minerals  1 tablet Oral Daily    pantoprazole  40 mg Oral Daily    senna  1 tablet Oral BID    tamsulosin  0.4 mg Oral Nightly    insulin lispro  0-18 Units Subcutaneous TID WC    insulin lispro  0-9 Units Subcutaneous Nightly    polyethylene glycol  17 g Oral Every Other Day    cefTRIAXone (ROCEPHIN) IV  1 g Intravenous Q24H    metroNIDAZOLE  500 mg Intravenous Q8H     PRN Meds: sodium chloride flush, acetaminophen **OR** acetaminophen, polyethylene glycol, promethazine **OR** ondansetron, magnesium sulfate, potassium chloride **OR** potassium alternative oral replacement **OR** potassium chloride, albuterol sulfate HFA, benzocaine, guaiFENesin, ondansetron, glucose, dextrose, glucagon (rDNA), dextrose      Intake/Output Summary (Last 24 hours) at 11/13/2020 1042  Last data filed at 11/12/2020 2118  Gross per 24 hour   Intake 3803 ml   Output 3850 ml   Net -47 ml       Diet:  DIET CARB CONTROL; Dietary Nutrition Supplements: Low Calorie High Protein Supplement  Dietary Nutrition Supplements: Wound Healing Oral Supplement    Exam:  /67   Pulse 77   Temp 97.8 °F (36.6 °C) (Oral)   Resp 14   Ht 5' 7\" (1.702 m)   Wt 279 lb (126.6 kg)   SpO2 93%   BMI 43.70 kg/m²     General appearance: Alert and appropriate, pleasant morbidly obese adult male. No apparent distress, appears stated age and cooperative. HEENT: Pupils equal, round, and reactive to light. Conjunctivae/corneas clear. Neck: Supple, with full range of motion. No jugular venous distention. Trachea midline. Respiratory:  Normal respiratory effort. Clear to auscultation, bilaterally without Rales/Wheezes/Rhonchi. Cardiovascular: Regular rate and rhythm with normal S1/S2 without murmurs, rubs or gallops. Abdomen: Soft, non-tender, non-distended with normal bowel sounds. Multiple episodes of liquid stool noted. Musculoskeletal: Passive and active ROM x 4 extremities. Skin: Skin color, texture, turgor normal.  No rashes or lesions. Neurologic:  Neurovascularly intact without any focal sensory/motor deficits. Psychiatric: Alert and oriented, engaged in conversation appropriately. Thought content reasonable, decent insight  Capillary Refill: Brisk,< 3 seconds   Peripheral Pulses: +2 palpable, equal bilaterally     Labs:   Recent Labs     11/11/20  0703 11/12/20  0849 11/13/20  0652   WBC 7.9 8.2 8.2   HGB 10.1* 9.7* 10.2*   HCT 33.3* 32.5* 34.1*    326 327     Recent Labs     11/11/20  0703 11/12/20  0744 11/13/20  0652   * 147* 145   K 3.1* 3.3* 3.3*    106 103   CO2 24 26 28   BUN 3* 5* 5*   CREATININE 1.1 1.1 1.1   CALCIUM 9.0 9.4 9.3     No results for input(s): AST, ALT, BILIDIR, BILITOT, ALKPHOS in the last 72 hours.   No results for input(s): INR in the last 72 hours. No results for input(s): Grant Glover in the last 72 hours. Microbiology:    Blood culture #1:   Lab Results   Component Value Date    BC No growth-preliminary No growth  10/28/2020       Blood culture #2:No results found for: Becki Colin    Organism:  Lab Results   Component Value Date    ORG Staphylococcus (coagulase negative) 10/22/2020         Lab Results   Component Value Date    LABGRAM  10/12/2020     Moderate segmented neutrophils observed. Rare epithelial cells observed. Rare gram positive cocci occurring singly and in pairs. MRSA culture only:No results found for: [facility]    Urine culture:   Lab Results   Component Value Date    Sin [place]  10/06/2020     At least one of drugs in current antibiotic therapy is ineffective in vitro for isolate. LABURIN [place] count: >100,000 CFU/mL   10/06/2020       Respiratory culture: No results found for: CULTRESP    Aerobic and Anaerobic :  No results found for: LABAERO  No results found for: LABANAE    Urinalysis:      Lab Results   Component Value Date    NITRU NEGATIVE 11/03/2020    WBCUA 25-50 11/03/2020    BACTERIA FEW 11/03/2020    RBCUA 3-5 11/03/2020    BLOODU NEGATIVE 11/03/2020    SPECGRAV >=1.030 10/06/2020    GLUCOSEU NEGATIVE 11/03/2020       Radiology:  CT ABDOMEN PELVIS W IV CONTRAST Additional Contrast? None   Final Result      1. Extensive thickening is seen in the region of the rectum. This is likely on the basis of colitis. 2. Persistence of extensive calcifications in the fascial planes throughout the abdomen and pelvis. This could be on the basis of dermatomyositis. 3. Hepatic steatosis. 4. There is a 3 mm pulmonary nodule near the right lung base. Follow-up is recommended in 6-12 months to assess for stability. **This report has been created using voice recognition software. It may contain minor errors which are inherent in voice recognition technology. **      Final report electronically signed by  Denise Ortega on 11/7/2020 8:17 PM        Ct Abdomen Pelvis W Iv Contrast Additional Contrast? None    Result Date: 11/7/2020  PROCEDURE: CT ABDOMEN PELVIS W IV CONTRAST CLINICAL INFORMATION: 57-year-old male with rectal bleeding. Possible fistula. Abdominal pain . COMPARISON: CT scan 10/20/2020. TECHNIQUE: 5 mm axial CT images were obtained through the abdomen and pelvis after the administration of intravenous and oral contrast. Coronal and sagittal reconstructions were obtained. All CT scans at this facility use dose modulation, iterative reconstruction, and/or weight-based dosing when appropriate to reduce radiation dose to as low as reasonably achievable. FINDINGS: Atelectasis is noted at the bilateral lung bases. On axial image #1 there is a 3 mm pulmonary nodule in the anterior right lung. There is no pleural effusion. The base of the heart is within acceptable limits. The liver is hypoattenuated. This may be due to fatty infiltration. The gallbladder, pancreas, adrenal glands and spleen are within normal limits. There are some splenule seen in the left upper quadrant. The kidneys are symmetric in size, shape and degree of enhancement. There is no hydronephrosis. There is no evidence of a small bowel obstruction. There is circumferential wall thickening in the region of the rectum. There is a Dimas catheter within the lumen of the urinary bladder. The aorta and IVC are normal in caliber. Extensive calcifications are again seen in the fascial planes throughout the abdomen and pelvis. This is most notable in the right gluteal region. This finding is similar to the previous exam. Haziness is seen throughout the superficial subcutaneous soft tissues of the ventral abdominal wall. This may be on the basis of previous injections. The osseous structures are intact. There are degenerative changes in the spine. No acute fractures. 1. Extensive thickening is seen in the region of the rectum.  This is likely on the basis of colitis. 2. Persistence of extensive calcifications in the fascial planes throughout the abdomen and pelvis. This could be on the basis of dermatomyositis. 3. Hepatic steatosis. 4. There is a 3 mm pulmonary nodule near the right lung base. Follow-up is recommended in 6-12 months to assess for stability. **This report has been created using voice recognition software. It may contain minor errors which are inherent in voice recognition technology. ** Final report electronically signed by Dr Mariel Alcantar on 11/7/2020 8:17 PM      **This report has been created using voice recognition software. It may contain minor errors which are inherent in voice recognition technology. **  Electronically signed by SANDRA Costa CNP on 11/13/2020 at 10:42 AM

## 2020-11-13 NOTE — PROGRESS NOTES
[address] [facility] 5[facility]  Occupational Therapy  Daily Note  Time:    Time In: 8643  Time Out: 1235  Timed Code Treatment Minutes: 23 Minutes  Minutes: 23          Date: 2020  Patient Name: Luis Herrera,   Gender: male      [facility]  MRN: [id number]  : 1970  (44 y.o.)  Referring Practitioner: BHUPINDER Bazan  Diagnosis: Perirectal Abscess  Additional Pertinent Hx: Pt with multiple co-morbidities, with a prolonged hospitalization with covid pna, and multiple issues, discharged on  to [facility] who presented to [facility] with rectal bleeding. Patient had similar issues while admitted but at the [facility], patient was having rectal bleed, drainage and pain in the rectum. Per ER, pt has swelling in the area, discharge and redness. Case was discussed with general surgery who recommend admission and consult to surgery. Patients labs were not concerning and actually better than when he was discharged. His hgb is improved as well. Pt was given rocephin and flagyl in the ER    Restrictions/Precautions:  Restrictions/Precautions: Fall Risk      SUBJECTIVE: required encouragement, yelling for someone to come in & reposition him when therapist walked by the room    PAIN: no number given/10: c/o pain in bottom    COGNITION: Decreased Insight    ADL:   Lower Extremity Dressing: Dependent. for adjusting slipper socks. BALANCE:  Sitting Balance:  Stand By Assistance. Standing Balance: Minimal Assistance, X 2.      BED MOBILITY:  Supine to Sit: Stand By Assistance    Sit to Supine: Stand By Assistance     **HOB elevated    TRANSFERS:  Sit to Stand: Moderate Assistance, X 2. ont [address]; declined trying without LENORA CUEVA (reports this is what he has been using at [facility])    **Pt declined sitting in recliner at end of session     ASSESSMENT:     Activity Tolerance:  Patient tolerance of  treatment: fair.         Discharge Recommendations: Continue to assess pending progress, ECF with OT    Equipment Recommendations: Equipment Needed: No  Other: Will continue to monitor  Plan: Times per week: 3-5x  Specific instructions for Next Treatment: Functional transfers as able; ADLs while sitting upright; arm and leg exercises  Current Treatment Recommendations: Strengthening, Functional Mobility Training, Endurance Training, Self-Care / ADL  Plan Comment: Pt would benefit from continued skilled OT services when medically stable and discharged from Acute. OT recommended while at [facility]. Patient Education  Patient Education: safety with t/fs, importance of OOB    Goals  Short term goals  Time Frame for Short term goals: By discharge  Short term goal 1: Pt will complete BUE ROM/moderate resistance exercises x 5 sets of 10 reps each to increase his strength and endurance for ease of doing self care and functional transfers. NOT MET, CONTINUE  Short term goal 2: Pt will complete functional transfers with OTR to prepare for doing self care while out of bed. MET, REVISE    Revised Short-Term Goals  Short term goals  Time Frame for Short term goals: By discharge  Short term goal 1: Pt will complete BUE ROM/moderate resistance exercises x 5 sets of 10 reps each to increase his strength and endurance for ease of doing self care and functional transfers. Short term goal 2: Pt marilin complete sit-stand t/s onto LENORA MAURICIODY with min A x 2 & min vcs for technique for eventual BSC t/fs  Short term goal 3: Pt will tolerate 8-10 min EOB ADL task with S & 0-2 vcs for safety      Following session, patient left in safe position with all fall risk precautions in place.
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
## 2020-11-13 NOTE — CARE COORDINATION
11/13/20, 10:04 AM EST    DISCHARGE PLANNING EVALUATION    PT and OT staff advised of need for updated notes for pre cert.

## 2020-11-13 NOTE — PROGRESS NOTES
Gastroenterology  Progress Note    11/13/2020 3:38 PM  Subjective:   Admit Date: 11/7/2020    Interval History: GI bleed with bright red blood as well as melena history of rectal ulcer due to rectal tube placed during COVID-19 recovery time. Melanotic stools or peptic ulcer disease. Patient breathing is fine now on room air. Morbidly obese and history of chronic alcohol abuse in the past the present after discharge to nursing home with GI bleed. Diet: DIET CARB CONTROL;   Dietary Nutrition Supplements: Low Calorie High Protein Supplement  Dietary Nutrition Supplements: Wound Healing Oral Supplement    Medications:   Scheduled Meds:    sodium chloride  500 mL Intravenous Once    sodium chloride flush  10 mL Intravenous 2 times per day    [Held by provider] enoxaparin  40 mg Subcutaneous Daily    allopurinol  500 mg Oral Daily    ARIPiprazole  15 mg Oral Daily    atorvastatin  40 mg Oral Nightly    busPIRone  10 mg Oral BID    citalopram  30 mg Oral Daily    ferrous sulfate  325 mg Oral BID WC    folic acid  1 mg Oral Daily    furosemide  40 mg Oral Daily    gabapentin  400 mg Oral BID    glycopyrrolate-formoterol  2 puff Inhalation BID    hydrALAZINE  50 mg Oral BID    [Held by provider] insulin glargine  10 Units Subcutaneous Nightly    modafinil  100 mg Oral Daily    therapeutic multivitamin-minerals  1 tablet Oral Daily    pantoprazole  40 mg Oral Daily    senna  1 tablet Oral BID    [Held by provider] tamsulosin  0.4 mg Oral Nightly    insulin lispro  0-18 Units Subcutaneous TID WC    insulin lispro  0-9 Units Subcutaneous Nightly    polyethylene glycol  17 g Oral Every Other Day    cefTRIAXone (ROCEPHIN) IV  1 g Intravenous Q24H    metroNIDAZOLE  500 mg Intravenous Q8H     Continuous Infusions:    dextrose 5 % and 0.45 % NaCl 75 mL/hr at 11/12/20 0947    dextrose         CBC:   Recent Labs     11/11/20  0703 11/12/20  0849 11/13/20  0652   WBC 7.9 8.2 8.2   HGB 10.1* 9.7* 10.2* in the fascial planes throughout the abdomen and pelvis. This is most notable in the right gluteal region. This finding is similar to the previous exam.    Haziness is seen throughout the superficial subcutaneous soft tissues of the ventral abdominal wall. This may be on the basis of previous injections. The osseous structures are intact. There are degenerative changes in the spine. No acute fractures. Impression 1. Extensive thickening is seen in the region of the rectum. This is likely on the basis of colitis. 2. Persistence of extensive calcifications in the fascial planes throughout the abdomen and pelvis. This could be on the basis of dermatomyositis. 3. Hepatic steatosis. 4. There is a 3 mm pulmonary nodule near the right lung base. Follow-up is recommended in 6-12 months to assess for stability. **This report has been created using voice recognition software. It may contain minor errors which are inherent in voice recognition technology. **    Final report electronically signed by Dr Yumi Castaneda on 11/7/2020 8:17 PM     No results found for this or any previous visit. No results found for this or any previous visit. Endoscopy Finding:      Objective:   Vitals: /67   Pulse 77   Temp 97.8 °F (36.6 °C) (Oral)   Resp 14   Ht 5' 7\" (1.702 m)   Wt 279 lb (126.6 kg)   SpO2 93%   BMI 43.70 kg/m²     Intake/Output Summary (Last 24 hours) at 11/13/2020 1538  Last data filed at 11/13/2020 1306  Gross per 24 hour   Intake 2240.68 ml   Output 3200 ml   Net -959.32 ml     General appearance: alert and cooperative with exam  Lungs: clear to auscultation bilaterally  Heart: regular rate and rhythm, S1, S2 normal, no murmur, click, rub or gallop  Abdomen: soft, non-tender; bowel sounds normal; no masses,  no organomegaly morbid obese  Extremities: extremities normal, atraumatic, no cyanosis or edema    Assessment and Plan:   1.  COVID-19 this year of the vent now on room air stable. 2. Lower GI bleed rectal tube sigmoidoscopy showed rectal ulcer plan to repeat colonoscopy to evaluate  3. Melena rule out peptic ulcer disease erosive gastritis  4. Morbid obese  5. His alcohol use in the past not drinking since her hospitalizations  6.  Plan for EGD to be done tomorrow and because will be done the day after      Follow up in GI Clinic after discharge in 4 week(s)    Patient Active Problem List:     History of atrial fibrillation     BPH (benign prostatic hyperplasia)     Carpal tunnel syndrome on right     Chronic gout     DM2 (diabetes mellitus, type 2) (HCC)     Heart failure with preserved ejection fraction (HCC)     History of alcohol abuse     History of osteomyelitis     Hypogonadism, male     Major depression     Morbid obesity ([address]     DURAN (nonalcoholic steatohepatitis)     KIARA treated with BiPAP     Vitamin D deficiency     Normocytic anemia     Physical deconditioning     Mood disorder (HCC)     Hallucinations     GERD (gastroesophageal reflux disease)     Wound of buttock     Primary osteoarthritis of left hip     Acute on chronic anemia     Dysphagia     Hypertension, essential     Dyslipidemia     Aortic stenosis, mild to moderate     Perirectal abscess      Electronically signed by Michelle Good MD on 11/13/2020 at 3:38 PM

## 2020-11-14 LAB
ANION GAP SERPL CALCULATED.3IONS-SCNC: 11 MEQ/L (ref 8–16)
BUN BLDV-MCNC: 4 MG/DL (ref 7–22)
CALCIUM SERPL-MCNC: 9.2 MG/DL (ref 8.5–10.5)
CHLORIDE BLD-SCNC: 106 MEQ/L (ref 98–111)
CO2: 30 MEQ/L (ref 23–33)
CREAT SERPL-MCNC: 1 MG/DL (ref 0.4–1.2)
ERYTHROCYTE [DISTWIDTH] IN BLOOD BY AUTOMATED COUNT: 15.9 % (ref 11.5–14.5)
ERYTHROCYTE [DISTWIDTH] IN BLOOD BY AUTOMATED COUNT: 55.9 FL (ref 35–45)
GFR SERPL CREATININE-BSD FRML MDRD: > 90 ML/MIN/1.73M2
GLUCOSE BLD-MCNC: 108 MG/DL (ref 70–108)
GLUCOSE BLD-MCNC: 123 MG/DL (ref 70–108)
GLUCOSE BLD-MCNC: 131 MG/DL (ref 70–108)
GLUCOSE BLD-MCNC: 140 MG/DL (ref 70–108)
HCT VFR BLD CALC: 33.7 % (ref 42–52)
HEMOGLOBIN: 10.3 GM/DL (ref 14–18)
MAGNESIUM: 1.5 MG/DL (ref 1.6–2.4)
MCH RBC QN AUTO: 29.2 PG (ref 26–33)
MCHC RBC AUTO-ENTMCNC: 30.6 GM/DL (ref 32.2–35.5)
MCV RBC AUTO: 95.5 FL (ref 80–94)
PLATELET # BLD: 335 THOU/MM3 (ref 130–400)
PMV BLD AUTO: 10.2 FL (ref 9.4–12.4)
POTASSIUM REFLEX MAGNESIUM: 3.2 MEQ/L (ref 3.5–5.2)
RBC # BLD: 3.53 MILL/MM3 (ref 4.7–6.1)
SODIUM BLD-SCNC: 147 MEQ/L (ref 135–145)
WBC # BLD: 7.9 THOU/MM3 (ref 4.8–10.8)

## 2020-11-14 PROCEDURE — 2500000003 HC RX 250 WO HCPCS: Performed by: FAMILY MEDICINE

## 2020-11-14 PROCEDURE — 94640 AIRWAY INHALATION TREATMENT: CPT

## 2020-11-14 PROCEDURE — 1200000003 HC TELEMETRY R&B

## 2020-11-14 PROCEDURE — 6370000000 HC RX 637 (ALT 250 FOR IP): Performed by: FAMILY MEDICINE

## 2020-11-14 PROCEDURE — 83735 ASSAY OF MAGNESIUM: CPT

## 2020-11-14 PROCEDURE — 94660 CPAP INITIATION&MGMT: CPT

## 2020-11-14 PROCEDURE — 36415 COLL VENOUS BLD VENIPUNCTURE: CPT

## 2020-11-14 PROCEDURE — 82948 REAGENT STRIP/BLOOD GLUCOSE: CPT

## 2020-11-14 PROCEDURE — 2709999900 HC NON-CHARGEABLE SUPPLY: Performed by: INTERNAL MEDICINE

## 2020-11-14 PROCEDURE — 0DJ08ZZ INSPECTION OF UPPER INTESTINAL TRACT, VIA NATURAL OR ARTIFICIAL OPENING ENDOSCOPIC: ICD-10-PCS | Performed by: INTERNAL MEDICINE

## 2020-11-14 PROCEDURE — 80048 BASIC METABOLIC PNL TOTAL CA: CPT

## 2020-11-14 PROCEDURE — 6370000000 HC RX 637 (ALT 250 FOR IP): Performed by: INTERNAL MEDICINE

## 2020-11-14 PROCEDURE — 94761 N-INVAS EAR/PLS OXIMETRY MLT: CPT

## 2020-11-14 PROCEDURE — 99152 MOD SED SAME PHYS/QHP 5/>YRS: CPT | Performed by: INTERNAL MEDICINE

## 2020-11-14 PROCEDURE — 2580000003 HC RX 258: Performed by: NURSE PRACTITIONER

## 2020-11-14 PROCEDURE — 85027 COMPLETE CBC AUTOMATED: CPT

## 2020-11-14 PROCEDURE — 3609017100 HC EGD: Performed by: INTERNAL MEDICINE

## 2020-11-14 PROCEDURE — 6360000002 HC RX W HCPCS: Performed by: FAMILY MEDICINE

## 2020-11-14 PROCEDURE — 99232 SBSQ HOSP IP/OBS MODERATE 35: CPT | Performed by: NURSE PRACTITIONER

## 2020-11-14 PROCEDURE — 6360000002 HC RX W HCPCS: Performed by: INTERNAL MEDICINE

## 2020-11-14 RX ORDER — FENTANYL CITRATE 50 UG/ML
INJECTION, SOLUTION INTRAMUSCULAR; INTRAVENOUS
Status: DISPENSED
Start: 2020-11-14 | End: 2020-11-15

## 2020-11-14 RX ORDER — MIDAZOLAM HYDROCHLORIDE 1 MG/ML
INJECTION INTRAMUSCULAR; INTRAVENOUS
Status: DISPENSED
Start: 2020-11-14 | End: 2020-11-15

## 2020-11-14 RX ORDER — MIDAZOLAM HYDROCHLORIDE 1 MG/ML
INJECTION INTRAMUSCULAR; INTRAVENOUS PRN
Status: DISCONTINUED | OUTPATIENT
Start: 2020-11-14 | End: 2020-11-14 | Stop reason: ALTCHOICE

## 2020-11-14 RX ORDER — SENNA PLUS 8.6 MG/1
15 TABLET ORAL ONCE
Status: COMPLETED | OUTPATIENT
Start: 2020-11-14 | End: 2020-11-14

## 2020-11-14 RX ORDER — POLYETHYLENE GLYCOL 3350 17 G/17G
238 POWDER, FOR SOLUTION ORAL ONCE
Status: COMPLETED | OUTPATIENT
Start: 2020-11-14 | End: 2020-11-14

## 2020-11-14 RX ORDER — FENTANYL CITRATE 50 UG/ML
INJECTION, SOLUTION INTRAMUSCULAR; INTRAVENOUS PRN
Status: DISCONTINUED | OUTPATIENT
Start: 2020-11-14 | End: 2020-11-14 | Stop reason: ALTCHOICE

## 2020-11-14 RX ADMIN — METRONIDAZOLE 500 MG: 500 INJECTION, SOLUTION INTRAVENOUS at 13:10

## 2020-11-14 RX ADMIN — CITALOPRAM 30 MG: 20 TABLET, FILM COATED ORAL at 08:51

## 2020-11-14 RX ADMIN — BUSPIRONE HYDROCHLORIDE 10 MG: 10 TABLET ORAL at 08:53

## 2020-11-14 RX ADMIN — DEXTROSE AND SODIUM CHLORIDE: 5; 450 INJECTION, SOLUTION INTRAVENOUS at 06:02

## 2020-11-14 RX ADMIN — FUROSEMIDE 40 MG: 40 TABLET ORAL at 08:53

## 2020-11-14 RX ADMIN — GLYCOPYRROLATE AND FORMOTEROL FUMARATE 2 PUFF: 9; 4.8 AEROSOL, METERED RESPIRATORY (INHALATION) at 09:58

## 2020-11-14 RX ADMIN — POLYETHYLENE GLYCOL 3350 238 G: 17 POWDER, FOR SOLUTION ORAL at 22:00

## 2020-11-14 RX ADMIN — GABAPENTIN 400 MG: 400 CAPSULE ORAL at 22:08

## 2020-11-14 RX ADMIN — ALLOPURINOL 500 MG: 300 TABLET ORAL at 08:53

## 2020-11-14 RX ADMIN — MAGNESIUM SULFATE HEPTAHYDRATE 2 G: 40 INJECTION, SOLUTION INTRAVENOUS at 08:53

## 2020-11-14 RX ADMIN — GABAPENTIN 400 MG: 400 CAPSULE ORAL at 08:51

## 2020-11-14 RX ADMIN — METRONIDAZOLE 500 MG: 500 INJECTION, SOLUTION INTRAVENOUS at 03:26

## 2020-11-14 RX ADMIN — HYDRALAZINE HYDROCHLORIDE 50 MG: 50 TABLET, FILM COATED ORAL at 08:51

## 2020-11-14 RX ADMIN — FERROUS SULFATE TAB 325 MG (65 MG ELEMENTAL FE) 325 MG: 325 (65 FE) TAB at 08:53

## 2020-11-14 RX ADMIN — ATORVASTATIN CALCIUM 40 MG: 40 TABLET, FILM COATED ORAL at 22:08

## 2020-11-14 RX ADMIN — STANDARDIZED SENNA CONCENTRATE 8.6 MG: 8.6 TABLET ORAL at 08:51

## 2020-11-14 RX ADMIN — STANDARDIZED SENNA CONCENTRATE 129 MG: 8.6 TABLET ORAL at 18:57

## 2020-11-14 RX ADMIN — STANDARDIZED SENNA CONCENTRATE 8.6 MG: 8.6 TABLET ORAL at 22:09

## 2020-11-14 RX ADMIN — POTASSIUM CHLORIDE 40 MEQ: 1500 TABLET, EXTENDED RELEASE ORAL at 07:02

## 2020-11-14 RX ADMIN — BUSPIRONE HYDROCHLORIDE 10 MG: 10 TABLET ORAL at 22:08

## 2020-11-14 RX ADMIN — FOLIC ACID 1 MG: 1 TABLET ORAL at 08:51

## 2020-11-14 RX ADMIN — PANTOPRAZOLE SODIUM 40 MG: 40 TABLET, DELAYED RELEASE ORAL at 08:51

## 2020-11-14 RX ADMIN — ARIPIPRAZOLE 15 MG: 15 TABLET ORAL at 08:53

## 2020-11-14 RX ADMIN — HYDRALAZINE HYDROCHLORIDE 50 MG: 50 TABLET, FILM COATED ORAL at 22:10

## 2020-11-14 RX ADMIN — MODAFINIL 100 MG: 100 TABLET ORAL at 08:53

## 2020-11-14 RX ADMIN — GLYCOPYRROLATE AND FORMOTEROL FUMARATE 2 PUFF: 9; 4.8 AEROSOL, METERED RESPIRATORY (INHALATION) at 20:30

## 2020-11-14 ASSESSMENT — PAIN SCALES - GENERAL
PAINLEVEL_OUTOF10: 0

## 2020-11-14 NOTE — PROGRESS NOTES
Hospitalist Progress Note      Patient:  Brenda Asa    Unit/Bed:5K-22/022-A  YOB: 1970  MRN: [id number]   Acct: [de-identified]   PCP: No primary care provider on file. Date of Admission: 11/7/2020    Assessment/Plan:    1. Perirectal ulceration with bleeding. Flexiseal likely contributed. - surgery seen, discussed with ID. No surgical plans. ID signed off.   - CT abd shows colitis  - continue iv abx Rocephin and Flagyl.  - holding asa and Lovenox  -Continue zinc oxide ointment and cleaning of the perianal area after bowel movements.  -Wound/ostomy consulted. -GI seen. -EGD today.  -Colonoscopy tomorrow.     2. Chronic Resp failure/KIARA  - use bipap at night, and during naps. - avoid sedative meds  - continue modafinil  - continuous pulse ox       3. Urinary retention   - mason in place. -OP follow up with Urology.      4. S/p sepsis/covid 19 pna/ prolonged hosp stay    5. S/p Dysphagia    6. IDDM. BS trend remains acceptable. - Holding home Lantus  - Hypoglycemia protocol, ac&hs checks    7. Morbid obesity: BMI 43.7    8. Hypokalemia, replacing. .     9. Hypomagnesemia. Replacing. .      10. Hypernatremia, mild. Monitor. 11. Macrocytic anemia. Hbg stable at 10.2. 12. Deconditioning. PT/OT    13. Orthostatic hypotension. S/p IVF bolus 11/13.         Chief Complaint: Rectal bleed    Initial H and P:-    **From Chart Review:  \"51 y.o. male with multiple co-morbidities, with a prolonged hospitalization with covid pna, and multiple issues, discharged on 11/7 to [facility] who presented to [address] [place] [address] with rectal bleeding. Patient had similar issues while admitted but at the [facility], patient was having rectal bleed, drainage and pain in the rectum. Per ER, pt has swelling in the area, discharge and redness. Case was discussed with general surgery who recommend admission and consult to surgery.  Patients labs were not concerning and Intake 1055.68 ml   Output 1350 ml   Net -294.32 ml       Diet:  Diet NPO, After Midnight    Exam:  /79   Pulse 74   Temp 98.4 °F (36.9 °C) (Axillary)   Resp 14   Ht 5' 7\" (1.702 m)   Wt 279 lb (126.6 kg)   SpO2 100%   BMI 43.70 kg/m²     General appearance: Alert and appropriate, pleasant morbidly obese adult male. No apparent distress, appears stated age and cooperative. HEENT: Pupils equal, round, and reactive to light. Conjunctivae/corneas clear. Neck: Supple, with full range of motion. No jugular venous distention. Trachea midline. Respiratory:  Normal respiratory effort. Clear to auscultation, bilaterally without Rales/Wheezes/Rhonchi. Cardiovascular: Regular rate and rhythm with normal S1/S2 without murmurs, rubs or gallops. Abdomen: Soft, non-tender, non-distended with normal bowel sounds. Multiple episodes of liquid stool noted. Musculoskeletal: Passive and active ROM x 4 extremities. Skin: Skin color, texture, turgor normal.  No rashes or lesions. Neurologic:  Neurovascularly intact without any focal sensory/motor deficits. Psychiatric: Alert and oriented, engaged in conversation appropriately. Thought content reasonable, decent insight  Capillary Refill: Brisk,< 3 seconds   Peripheral Pulses: +2 palpable, equal bilaterally     Labs:   Recent Labs     11/12/20  0849 11/13/20  0652 11/14/20  0611   WBC 8.2 8.2 7.9   HGB 9.7* 10.2* 10.3*   HCT 32.5* 34.1* 33.7*    327 335     Recent Labs     11/12/20  0744 11/13/20  0652 11/14/20  0611   * 145 147*   K 3.3* 3.3* 3.2*    103 106   CO2 26 28 30   BUN 5* 5* 4*   CREATININE 1.1 1.1 1.0   CALCIUM 9.4 9.3 9.2     No results for input(s): AST, ALT, BILIDIR, BILITOT, ALKPHOS in the last 72 hours. No results for input(s): INR in the last 72 hours. No results for input(s): Peter Seed in the last 72 hours.     Microbiology:    Blood culture #1:   Lab Results   Component Value Date    BC No growth-preliminary No growth  10/28/2020       Blood culture #2:No results found for: Almyra Carrel    Organism:  Lab Results   Component Value Date    ORG Staphylococcus (coagulase negative) 10/22/2020         Lab Results   Component Value Date    LABGRAM  10/12/2020     Moderate segmented neutrophils observed. Rare epithelial cells observed. Rare gram positive cocci occurring singly and in pairs. MRSA culture only:No results found for: [facility]    Urine culture:   Lab Results   Component Value Date    Wanmena Willamsat  10/06/2020     At least one of drugs in current antibiotic therapy is ineffective in vitro for isolate. LABURIN Tatamy count: >100,000 CFU/mL   10/06/2020       Respiratory culture: No results found for: CULTRESP    Aerobic and Anaerobic :  No results found for: LABAERO  No results found for: LABANAE    Urinalysis:      Lab Results   Component Value Date    NITRU NEGATIVE 11/03/2020    WBCUA 25-50 11/03/2020    BACTERIA FEW 11/03/2020    RBCUA 3-5 11/03/2020    BLOODU NEGATIVE 11/03/2020    SPECGRAV >=1.030 10/06/2020    GLUCOSEU NEGATIVE 11/03/2020       Radiology:  XR CHEST (2 VW)   Final Result   Low lung volumes with probable subsegmental atelectasis at the right midlung. **This report has been created using voice recognition software. It may contain minor errors which are inherent in voice recognition technology. **      Final report electronically signed by Dr. Shira Sharpe MD on 11/13/2020 11:30 AM      CT ABDOMEN PELVIS W IV CONTRAST Additional Contrast? None   Final Result      1. Extensive thickening is seen in the region of the rectum. This is likely on the basis of colitis. 2. Persistence of extensive calcifications in the fascial planes throughout the abdomen and pelvis. This could be on the basis of dermatomyositis. 3. Hepatic steatosis. 4. There is a 3 mm pulmonary nodule near the right lung base. Follow-up is recommended in 6-12 months to assess for stability.          **This report has been created using voice recognition software. It may contain minor errors which are inherent in voice recognition technology. **      Final report electronically signed by Dr Tisha Stevenson on 11/7/2020 8:17 PM        Ct Abdomen Pelvis W Iv Contrast Additional Contrast? None    Result Date: 11/7/2020  PROCEDURE: CT ABDOMEN PELVIS W IV CONTRAST CLINICAL INFORMATION: 49-year-old male with rectal bleeding. Possible fistula. Abdominal pain . COMPARISON: CT scan 10/20/2020. TECHNIQUE: 5 mm axial CT images were obtained through the abdomen and pelvis after the administration of intravenous and oral contrast. Coronal and sagittal reconstructions were obtained. All CT scans at this facility use dose modulation, iterative reconstruction, and/or weight-based dosing when appropriate to reduce radiation dose to as low as reasonably achievable. FINDINGS: Atelectasis is noted at the bilateral lung bases. On axial image #1 there is a 3 mm pulmonary nodule in the anterior right lung. There is no pleural effusion. The base of the heart is within acceptable limits. The liver is hypoattenuated. This may be due to fatty infiltration. The gallbladder, pancreas, adrenal glands and spleen are within normal limits. There are some splenule seen in the left upper quadrant. The kidneys are symmetric in size, shape and degree of enhancement. There is no hydronephrosis. There is no evidence of a small bowel obstruction. There is circumferential wall thickening in the region of the rectum. There is a Dimas catheter within the lumen of the urinary bladder. The aorta and IVC are normal in caliber. Extensive calcifications are again seen in the fascial planes throughout the abdomen and pelvis. This is most notable in the right gluteal region. This finding is similar to the previous exam. Haziness is seen throughout the superficial subcutaneous soft tissues of the ventral abdominal wall. This may be on the basis of previous injections.  The

## 2020-11-14 NOTE — BRIEF OP NOTE
Brief Postoperative Note      Patient: Leah Celeste  YOB: 1970  MRN: [id number]    Date of Procedure: 11/14/2020    Pre-Op Diagnosis: gi bleed, melena    Post-Op Diagnosis: gastritis, no active GI bleeding. Procedure(s):  EGD DIAGNOSTIC ONLY    Surgeon(s):  Moris Weiner MD    Assistant:  * No surgical staff found *    Anesthesia: IV Sedation    Estimated Blood Loss (mL): none    Complications: None    Specimens:   * No specimens in log *    Implants:  * No implants in log *      Drains:   Urethral Catheter 16 fr (Active)   Catheter Indications Acute urinary retention/obstruction 11/13/20 2359   Site Assessment No urethral drainage 11/13/20 1306   Urine Color Yellow 11/13/20 2359   Urine Appearance Clear 11/13/20 2359   Urine Odor Malodorous 11/13/20 0258   Output (mL) 1350 mL 11/13/20 1306       [REMOVED] NG/OG/NJ/NE Tube Esophageal Center mouth (Removed)   Surrounding Skin Dry; Intact 09/26/20 0900   Securement device Yes 09/26/20 0900   Status Suction-low intermittent 09/24/20 1200   Placement Verified by Respiratory Status;by Gastric Contents;by External Catheter Length 09/26/20 0900   NG/OG/NJ/NE External Measurement (cm) 60 cm 09/26/20 0900   Drainage Appearance Bile 09/26/20 0900   Tube Feeding Semi-elemental 09/26/20 0900   Tube Feeding Status Continuous 09/26/20 0900   Rate/Schedule 20 mL/hr 09/26/20 0900   Tube Feeding Supplement (Product) Protein Modular 09/26/20 0900   Tube Feeding Supplement Amount (mL) 75 09/26/20 0900   Tube Feeding Intake (mL) 310 ml 09/26/20 0402   Free Water Flush (mL) 25 mL 09/26/20 0900   Residual Volume (ml) 10 ml 09/26/20 0900       [REMOVED] NG/OG/NJ/NE Tube Orogastric 18 fr Left mouth (Removed)   Surrounding Skin Dry; Intact 10/01/20 1640   Securement device Yes 10/01/20 1640   Status Other (Comment) 10/01/20 1640   Placement Verified by External Catheter Length 10/02/20 0403   NG/OG/NJ/NE External Measurement (cm) 63 cm 10/02/20 0403   Drainage Color Tea 10/05/20 2136   Urine Appearance Cloudy 10/05/20 2136   Urine Odor Malodorous 09/26/20 1415   Output (mL) 50 mL 10/05/20 2136       [REMOVED] Urethral Catheter Temperature probe (Removed)   $ Urethral catheter insertion $ Not inserted for procedure 10/18/20 2011   Catheter Indications Need for fluid management in critically ill patients in a critical care setting not able to be managed by other means such as BSC with hat, bedpan, urinal, condom catheter, or short term intermittent urethral catherization 10/19/20 1615   Site Assessment Pink 10/19/20 1615   Urine Color Yellow 10/19/20 1615   Urine Appearance Clear 10/19/20 1615   Output (mL) 400 mL 10/19/20 1800       [REMOVED] Urethral Catheter 16 fr (Removed)   $ Urethral catheter insertion $ Not inserted for procedure 10/20/20 1200   Catheter Indications Acute urinary retention/obstruction 11/02/20 2119   Site Assessment Pink; No urethral drainage 11/02/20 2119   Urine Color Yellow 11/02/20 2119   Urine Appearance Sediment 11/02/20 2119   Output (mL) 650 mL 11/02/20 2119       Findings:  gastritis, no active GI bleeding.      Electronically signed by Balta Friedman MD on 11/14/2020 at 1:52 PM

## 2020-11-14 NOTE — PROGRESS NOTES
Report called to [address] [place] [address]  Patient sleepy but awake and talking. Patient repositioned. Pt states no pain and no nausea. Statement Selected

## 2020-11-14 NOTE — PRE SEDATION
[facility]  Sedation/Analgesia History & Physical    Patient: Stiven [place]: 1970  Med Rec#: [id number] Acc#: [id number]   Provider Performing Procedure: Moris Weiner  Primary Care Physician: No primary care provider on file. PRE-PROCEDURE   Full CODE [x]Yes  DNR-CCA/DNR-CC []Yes   Brief History/Pre-Procedure Diagnosis:GI bleeding, melena           MEDICAL HISTORY  []CAD/Valve  []Liver Disease  []Lung Disease []Diabetes  []Hypertension []Renal Disease  []Additional information:       has a past medical history of Aortic stenosis, mild to moderate, BPH (benign prostatic hyperplasia), Carpal tunnel syndrome on right, Chronic gout, DM2 (diabetes mellitus, type 2) ([facility] [address] Dyslipidemia, GERD (gastroesophageal reflux disease), Heart failure with preserved ejection fraction ([facility] [address] History of alcohol abuse, History of atrial fibrillation, History of osteomyelitis, Hypertension, essential, Hypogonadism, male, Major depression, Mood disorder ([facility] [address] Morbid obesity ([facility] [address] DURAN (nonalcoholic steatohepatitis), KIARA treated with BiPAP, and Vitamin D deficiency. SURGICAL HISTORY   has a past surgical history that includes tracheostomy; Foot surgery (Right); hip surgery (Left, 6/29/2020); and sigmoidoscopy (N/A, 10/26/2020).   Additional information:       ALLERGIES   Allergies as of 11/07/2020 - Review Complete 11/07/2020   Allergen Reaction Noted    Pcn [penicillins]  10/19/2011     Additional information:       MEDICATIONS   Coumadin Use Last 7 Days [x]No []Yes  Antiplatelet drug therapy use last 7 days  [x]No []Yes  Other anticoagulant use last 7 days  [x]No []Yes    Current Facility-Administered Medications:     midazolam (VERSED) 5 MG/5ML injection, , , ,     fentaNYL (SUBLIMAZE) 100 MCG/2ML injection, , , ,     dextrose 5 % and 0.45 % sodium chloride infusion, , Intravenous, Continuous, SANDRA Mccarty CNP, Last Rate: 75 mL/hr at 11/14/20 0602    sodium Rogelio Kenny MD, 325 mg at 29/18/15 9320    folic acid (FOLVITE) tablet 1 mg, 1 mg, Oral, Daily, Mohsin Reza, MD, 1 mg at 11/14/20 0851    furosemide (LASIX) tablet 40 mg, 40 mg, Oral, Daily, Mohsin Reza, MD, 40 mg at 11/14/20 0853    gabapentin (NEURONTIN) capsule 400 mg, 400 mg, Oral, BID, Mohsin Reza, MD, 400 mg at 11/14/20 0851    glycopyrrolate-formoterol (BEVESPI) 9-4.8 MCG/ACT inhaler 2 puff, 2 puff, Inhalation, BID, Rogelio Kenny MD, 2 puff at 11/14/20 0958    guaiFENesin (ROBITUSSIN) 100 MG/5ML oral solution 200 mg, 200 mg, Oral, Q4H PRN, Mohsin Reza, MD    hydrALAZINE (APRESOLINE) tablet 50 mg, 50 mg, Oral, BID, Rogelio Kenny MD, 50 mg at 11/14/20 0851    [Held by provider] insulin glargine (LANTUS) injection vial 10 Units, 10 Units, Subcutaneous, Nightly, Mohsin Reza, MD    modafinil (PROVIGIL) tablet 100 mg, 100 mg, Oral, Daily, Rogelio Kenny MD, 100 mg at 11/14/20 0853    therapeutic multivitamin-minerals 1 tablet, 1 tablet, Oral, Daily, Rogelio Kenny MD, 1 tablet at 11/13/20 0931    ondansetron (ZOFRAN) tablet 4 mg, 4 mg, Oral, Q8H PRN, Mohsin Reza, MD    pantoprazole (PROTONIX) tablet 40 mg, 40 mg, Oral, Daily, Rogelio Kenny MD, 40 mg at 11/14/20 0851    senna (SENOKOT) tablet 8.6 mg, 1 tablet, Oral, BID, Rogelio Kenny MD, 8.6 mg at 11/14/20 0851    [Held by provider] tamsulosin (FLOMAX) capsule 0.4 mg, 0.4 mg, Oral, Nightly, Mohsin Reza, MD, 0.4 mg at 11/12/20 2024    insulin lispro (HUMALOG) injection vial 0-18 Units, 0-18 Units, Subcutaneous, TID WC, Rogelio Kenny MD, Stopped at 11/14/20 1314    insulin lispro (HUMALOG) injection vial 0-9 Units, 0-9 Units, Subcutaneous, Nightly, Rogelio Kenny MD, Stopped at 11/09/20 2236    glucose (GLUTOSE) 40 % oral gel 15 g, 15 g, Oral, PRN, Rogelio Kenny MD    dextrose 50 % IV solution, 12.5 g, Intravenous, PRN, Mohsin Reza, MD    glucagon (rDNA) injection 1 mg, 1 mg, Intramuscular, PRN, Rogelio Kenny MD    dextrose 5 % solution, 100 mL/hr, Intravenous, PRN, Mohsin Reza, MD    polyethylene glycol (GLYCOLAX) packet 17 g, 17 g, Oral, Every Other Day, Mohsin Reza, MD    cefTRIAXone (ROCEPHIN) 1 g IVPB in 50 mL D5W minibag, 1 g, Intravenous, Q24H, Merly Patiño MD, Stopped at 11/13/20 2351    metronidazole (FLAGYL) 500 mg in NaCl 100 mL IVPB premix, 500 mg, Intravenous, Q8H, Mohsin Reza, MD, Last Rate: 100 mL/hr at 11/14/20 1310, 500 mg at 11/14/20 1310  Prior to Admission medications    Medication Sig Start Date End Date Taking? Authorizing Provider   glycopyrrolate-formoterol (BEVESPI) 9-4.8 MCG/ACT AERO Inhale 2 puffs into the lungs 2 times daily 11/6/20  Yes Vanna Eddy MD   ferrous sulfate (IRON 325) 325 (65 Fe) MG tablet Take 1 tablet by mouth 2 times daily (with meals) 11/6/20  Yes Vanna Eddy MD   modafinil (PROVIGIL) 100 MG tablet Take 1 tablet by mouth daily for 30 days. TAKE IN THE MORNING. 11/7/20 12/7/20 Yes Vanna Eddy MD   gabapentin (NEURONTIN) 400 MG capsule Take 1 capsule by mouth 2 times daily for 180 days.  11/6/20 5/5/21 Yes Vanna Eddy MD   vitamin C (ASCORBIC ACID) 500 MG tablet Take 2 tablets by mouth daily 9/15/20  Yes SANDRA Abdul CNP   aspirin 81 MG chewable tablet Take 1 tablet by mouth daily 9/16/20  Yes SANDRA Abdul CNP   CHOLECALCIFEROL PO Take 2,000 Units by mouth daily   Yes Historical Provider, MD   atorvastatin (LIPITOR) 40 MG tablet Take 40 mg by mouth nightly   Yes Historical Provider, MD   acetaminophen (TYLENOL) 325 MG tablet Take 650 mg by mouth every 4 hours as needed for Pain   Yes Historical Provider, MD   guaiFENesin (ROBITUSSIN) 100 MG/5ML syrup Take 200 mg by mouth every 4 hours as needed for Cough   Yes Historical Provider, MD   busPIRone (BUSPAR) 10 MG tablet Take 10 mg by mouth 2 times daily    Yes Historical Provider, MD   pantoprazole (PROTONIX) 40 MG tablet Take 1 tablet by mouth daily 5/22/20  Yes Aletha Romo MD   allopurinol (ZYLOPRIM) 300 MG tablet Take 1 tablet by mouth daily  Patient taking differently: Take 500 mg by mouth daily  4/9/20  Yes Tori Escobedo DO   hydrALAZINE (APRESOLINE) 50 MG tablet Take 50 mg by mouth 2 times daily    Yes Historical Provider, MD   polyethylene glycol (GLYCOLAX) powder Take 17 g by mouth every other day    Yes Historical Provider, MD   Multiple Vitamins-Minerals (THERAPEUTIC MULTIVITAMIN-MINERALS) tablet Take 1 tablet by mouth daily   Yes Historical Provider, MD   Probiotic Product (SOBIA-BID PROBIOTIC PO) Take 1 tablet by mouth daily    Yes Historical Provider, MD   tamsulosin (FLOMAX) 0.4 MG capsule Take 0.4 mg by mouth nightly    Yes Historical Provider, MD   folic acid (FOLVITE) 1 MG tablet Take 1 mg by mouth daily   Yes Historical Provider, MD   furosemide (LASIX) 40 MG tablet Take 40 mg by mouth daily   Yes Historical Provider, MD   insulin lispro (HUMALOG) 100 UNIT/ML injection vial Inject into the skin 3 times daily (before meals) Per scale: 151-200=1 unit, 201-250=2 units, 251-300-3 units, 301-350=4 units, 351-400=5 units. Yes Historical Provider, MD   sitaGLIPtan-metformin (JANUMET)  MG per tablet Take 1 tablet by mouth 2 times daily (with meals)   Yes Historical Provider, MD   insulin glargine (LANTUS) 100 UNIT/ML injection vial Inject 10 Units into the skin nightly    Yes Historical Provider, MD   senna (SENOKOT) 8.6 MG tablet Take 1 tablet by mouth 2 times daily   Yes Historical Provider, MD   ARIPiprazole (ABILIFY PO) Take 15 mg by mouth daily    Yes Historical Provider, MD   Citalopram Hydrobromide (CELEXA PO) Take 30 mg by mouth daily From [facility] professional services    Yes Historical Provider, MD   albuterol sulfate HFA (PROVENTIL HFA) 108 (90 Base) MCG/ACT inhaler Inhale 2 puffs into the lungs every 6 hours as needed for Wheezing 11/6/20   Early MD John   benzocaine (BABY ORAJEL) 7.5 % oral gel Take by mouth 4 times daily as needed for Pain (mouth pain) Take by mouth 3 times daily. incapacitating    Airway Assessment: Mallampati Class III - (soft palate & base of uvula are visible)    Monitoring and Safety: The patient will be placed on a cardiac monitor and vital signs, pulse oximetry and level of consciousness will be continuously evaluated throughout the procedure. The patient will be closely monitored until recovery from the medications is complete and the patient has returned to baseline status. Respiratory therapy will be on standby during the procedure. [x]Pre-procedure diagnostic studies complete and results available. Comment:    [x]Previous sedation/anesthesia experiences assessed. Comment:    [x]The patient is an appropriate candidate to undergo the planned procedure sedation and anesthesia. (Refer to nursing sedation/analgesia documentation record)  [x]Formulation and discussion of sedation/procedure plan, risks, and expectations with patient and/or responsible adult completed. [x]Patient examined immediately prior to the procedure.  (Refer to nursing sedation/analgesia documentation record)    Thaddeus Brink MD   Electronically signed 11/14/2020 at 1:37 PM

## 2020-11-15 LAB
ANION GAP SERPL CALCULATED.3IONS-SCNC: 11 MEQ/L (ref 8–16)
BUN BLDV-MCNC: 4 MG/DL (ref 7–22)
CALCIUM SERPL-MCNC: 9.4 MG/DL (ref 8.5–10.5)
CHLORIDE BLD-SCNC: 101 MEQ/L (ref 98–111)
CO2: 28 MEQ/L (ref 23–33)
CREAT SERPL-MCNC: 0.8 MG/DL (ref 0.4–1.2)
ERYTHROCYTE [DISTWIDTH] IN BLOOD BY AUTOMATED COUNT: 16 % (ref 11.5–14.5)
ERYTHROCYTE [DISTWIDTH] IN BLOOD BY AUTOMATED COUNT: 56.7 FL (ref 35–45)
GFR SERPL CREATININE-BSD FRML MDRD: > 90 ML/MIN/1.73M2
GLUCOSE BLD-MCNC: 105 MG/DL (ref 70–108)
GLUCOSE BLD-MCNC: 109 MG/DL (ref 70–108)
GLUCOSE BLD-MCNC: 113 MG/DL (ref 70–108)
GLUCOSE BLD-MCNC: 125 MG/DL (ref 70–108)
GLUCOSE BLD-MCNC: 127 MG/DL (ref 70–108)
GLUCOSE BLD-MCNC: 94 MG/DL (ref 70–108)
HCT VFR BLD CALC: 35.9 % (ref 42–52)
HEMOGLOBIN: 10.7 GM/DL (ref 14–18)
MAGNESIUM: 1.8 MG/DL (ref 1.6–2.4)
MCH RBC QN AUTO: 28.8 PG (ref 26–33)
MCHC RBC AUTO-ENTMCNC: 29.8 GM/DL (ref 32.2–35.5)
MCV RBC AUTO: 96.8 FL (ref 80–94)
PLATELET # BLD: 337 THOU/MM3 (ref 130–400)
PMV BLD AUTO: 10.8 FL (ref 9.4–12.4)
POTASSIUM REFLEX MAGNESIUM: 2.9 MEQ/L (ref 3.5–5.2)
RBC # BLD: 3.71 MILL/MM3 (ref 4.7–6.1)
SODIUM BLD-SCNC: 140 MEQ/L (ref 135–145)
WBC # BLD: 7.7 THOU/MM3 (ref 4.8–10.8)

## 2020-11-15 PROCEDURE — 2500000003 HC RX 250 WO HCPCS: Performed by: FAMILY MEDICINE

## 2020-11-15 PROCEDURE — 94640 AIRWAY INHALATION TREATMENT: CPT

## 2020-11-15 PROCEDURE — 85027 COMPLETE CBC AUTOMATED: CPT

## 2020-11-15 PROCEDURE — 36415 COLL VENOUS BLD VENIPUNCTURE: CPT

## 2020-11-15 PROCEDURE — 99232 SBSQ HOSP IP/OBS MODERATE 35: CPT | Performed by: NURSE PRACTITIONER

## 2020-11-15 PROCEDURE — 94660 CPAP INITIATION&MGMT: CPT

## 2020-11-15 PROCEDURE — 1200000003 HC TELEMETRY R&B

## 2020-11-15 PROCEDURE — 2700000000 HC OXYGEN THERAPY PER DAY

## 2020-11-15 PROCEDURE — 6360000002 HC RX W HCPCS: Performed by: FAMILY MEDICINE

## 2020-11-15 PROCEDURE — 3609027000 HC COLONOSCOPY: Performed by: INTERNAL MEDICINE

## 2020-11-15 PROCEDURE — 2580000003 HC RX 258: Performed by: FAMILY MEDICINE

## 2020-11-15 PROCEDURE — 99152 MOD SED SAME PHYS/QHP 5/>YRS: CPT | Performed by: INTERNAL MEDICINE

## 2020-11-15 PROCEDURE — 99153 MOD SED SAME PHYS/QHP EA: CPT | Performed by: INTERNAL MEDICINE

## 2020-11-15 PROCEDURE — 80048 BASIC METABOLIC PNL TOTAL CA: CPT

## 2020-11-15 PROCEDURE — 0DBK8ZX EXCISION OF ASCENDING COLON, VIA NATURAL OR ARTIFICIAL OPENING ENDOSCOPIC, DIAGNOSTIC: ICD-10-PCS | Performed by: INTERNAL MEDICINE

## 2020-11-15 PROCEDURE — 83735 ASSAY OF MAGNESIUM: CPT

## 2020-11-15 PROCEDURE — 6370000000 HC RX 637 (ALT 250 FOR IP): Performed by: FAMILY MEDICINE

## 2020-11-15 PROCEDURE — 82948 REAGENT STRIP/BLOOD GLUCOSE: CPT

## 2020-11-15 PROCEDURE — 2580000003 HC RX 258: Performed by: NURSE PRACTITIONER

## 2020-11-15 PROCEDURE — 94761 N-INVAS EAR/PLS OXIMETRY MLT: CPT

## 2020-11-15 PROCEDURE — 6360000002 HC RX W HCPCS: Performed by: INTERNAL MEDICINE

## 2020-11-15 PROCEDURE — 88305 TISSUE EXAM BY PATHOLOGIST: CPT

## 2020-11-15 RX ORDER — FENTANYL CITRATE 50 UG/ML
INJECTION, SOLUTION INTRAMUSCULAR; INTRAVENOUS
Status: DISPENSED
Start: 2020-11-15 | End: 2020-11-15

## 2020-11-15 RX ORDER — MIDAZOLAM HYDROCHLORIDE 1 MG/ML
INJECTION INTRAMUSCULAR; INTRAVENOUS PRN
Status: DISCONTINUED | OUTPATIENT
Start: 2020-11-15 | End: 2020-11-15 | Stop reason: ALTCHOICE

## 2020-11-15 RX ORDER — MIDAZOLAM HYDROCHLORIDE 1 MG/ML
INJECTION INTRAMUSCULAR; INTRAVENOUS
Status: DISPENSED
Start: 2020-11-15 | End: 2020-11-15

## 2020-11-15 RX ORDER — FENTANYL CITRATE 50 UG/ML
INJECTION, SOLUTION INTRAMUSCULAR; INTRAVENOUS PRN
Status: DISCONTINUED | OUTPATIENT
Start: 2020-11-15 | End: 2020-11-15 | Stop reason: ALTCHOICE

## 2020-11-15 RX ORDER — LIDOCAINE HYDROCHLORIDE 20 MG/ML
SOLUTION OROPHARYNGEAL
Status: DISPENSED
Start: 2020-11-15 | End: 2020-11-15

## 2020-11-15 RX ADMIN — GLYCOPYRROLATE AND FORMOTEROL FUMARATE 2 PUFF: 9; 4.8 AEROSOL, METERED RESPIRATORY (INHALATION) at 22:07

## 2020-11-15 RX ADMIN — MODAFINIL 100 MG: 100 TABLET ORAL at 15:21

## 2020-11-15 RX ADMIN — ACETAMINOPHEN 650 MG: 325 TABLET ORAL at 18:10

## 2020-11-15 RX ADMIN — GABAPENTIN 400 MG: 400 CAPSULE ORAL at 09:55

## 2020-11-15 RX ADMIN — METRONIDAZOLE 500 MG: 500 INJECTION, SOLUTION INTRAVENOUS at 18:01

## 2020-11-15 RX ADMIN — ALLOPURINOL 500 MG: 300 TABLET ORAL at 09:51

## 2020-11-15 RX ADMIN — FUROSEMIDE 40 MG: 40 TABLET ORAL at 09:54

## 2020-11-15 RX ADMIN — GABAPENTIN 400 MG: 400 CAPSULE ORAL at 21:39

## 2020-11-15 RX ADMIN — STANDARDIZED SENNA CONCENTRATE 8.6 MG: 8.6 TABLET ORAL at 09:58

## 2020-11-15 RX ADMIN — SODIUM CHLORIDE, PRESERVATIVE FREE 10 ML: 5 INJECTION INTRAVENOUS at 09:59

## 2020-11-15 RX ADMIN — FERROUS SULFATE TAB 325 MG (65 MG ELEMENTAL FE) 325 MG: 325 (65 FE) TAB at 16:48

## 2020-11-15 RX ADMIN — BUSPIRONE HYDROCHLORIDE 10 MG: 10 TABLET ORAL at 21:39

## 2020-11-15 RX ADMIN — CEFTRIAXONE SODIUM 1 G: 1 INJECTION, POWDER, FOR SOLUTION INTRAMUSCULAR; INTRAVENOUS at 21:24

## 2020-11-15 RX ADMIN — BUSPIRONE HYDROCHLORIDE 10 MG: 10 TABLET ORAL at 09:52

## 2020-11-15 RX ADMIN — DEXTROSE AND SODIUM CHLORIDE: 5; 450 INJECTION, SOLUTION INTRAVENOUS at 18:04

## 2020-11-15 RX ADMIN — FOLIC ACID 1 MG: 1 TABLET ORAL at 09:54

## 2020-11-15 RX ADMIN — METRONIDAZOLE 500 MG: 500 INJECTION, SOLUTION INTRAVENOUS at 00:47

## 2020-11-15 RX ADMIN — CEFTRIAXONE SODIUM 1 G: 1 INJECTION, POWDER, FOR SOLUTION INTRAMUSCULAR; INTRAVENOUS at 00:09

## 2020-11-15 RX ADMIN — HYDRALAZINE HYDROCHLORIDE 50 MG: 50 TABLET, FILM COATED ORAL at 09:56

## 2020-11-15 RX ADMIN — MULTIPLE VITAMINS W/ MINERALS TAB 1 TABLET: TAB at 09:58

## 2020-11-15 RX ADMIN — ARIPIPRAZOLE 15 MG: 15 TABLET ORAL at 09:52

## 2020-11-15 RX ADMIN — METRONIDAZOLE 500 MG: 500 INJECTION, SOLUTION INTRAVENOUS at 10:05

## 2020-11-15 RX ADMIN — CITALOPRAM 30 MG: 20 TABLET, FILM COATED ORAL at 09:53

## 2020-11-15 RX ADMIN — ATORVASTATIN CALCIUM 40 MG: 40 TABLET, FILM COATED ORAL at 21:39

## 2020-11-15 RX ADMIN — HYDRALAZINE HYDROCHLORIDE 50 MG: 50 TABLET, FILM COATED ORAL at 21:39

## 2020-11-15 RX ADMIN — FERROUS SULFATE TAB 325 MG (65 MG ELEMENTAL FE) 325 MG: 325 (65 FE) TAB at 10:00

## 2020-11-15 ASSESSMENT — PAIN SCALES - GENERAL
PAINLEVEL_OUTOF10: 3
PAINLEVEL_OUTOF10: 0
PAINLEVEL_OUTOF10: 6
PAINLEVEL_OUTOF10: 2
PAINLEVEL_OUTOF10: 3
PAINLEVEL_OUTOF10: 3

## 2020-11-15 ASSESSMENT — PAIN - FUNCTIONAL ASSESSMENT: PAIN_FUNCTIONAL_ASSESSMENT: 0-10

## 2020-11-15 NOTE — OP NOTE
[address] [id number]                                OPERATIVE REPORT    PATIENT NAME: Marcie Dave                :        1970  MED REC NO:   [id number]                           ROOM:  ACCOUNT NO:   [de-identified]                           ADMIT DATE: 2020  PROVIDER:     JASMYN Early OF PROCEDURE:  11/15/2020    INDICATIONS:  The patient with lower GI bleed. History of COVID-19  pneumonia. Recovered at this time. He had perianal ulcers after  placement of rectal tube. He had an upper endoscopy yesterday done to  evaluate. No gross abnormality was really seen. He has history of  melena as well as bright blood per rectum. Rectal discomfort on  defecation. He had sigmoidoscopy done in the past by Dr. Sariah Jo that  showed perianal ulcers. Plan today for colonoscopy to evaluate. SURGEON:  Nichole Dela Cruz MD    ASA CLASSIFICATION:  III. ESTIMATED BLOOD LOSS:  None. DESCRIPTION OF PROCEDURE:  The patient was brought to the GI lab. Consent was obtained. Risks involved with the procedure were explained  to the patient. Informed consent was obtained. The patient was  monitored during the procedure with pulse oximetry, blood pressure  monitoring, and oxygen by nasal cannula. Sedation by incremental doses  of IV Versed, total 3 mg of Versed and 75 mcg of fentanyl given in  incremental dosage during the procedure to achieve continuous conscious  sedation. PROCEDURE PERFORMED:  Colonoscopy with biopsy. Digital examination revealed significant healing perianal ulcers. No  pus coming out. This seemed to have clinically improved significantly  compared to previous description by other physician in the past as well  as by the tech who assisted me in doing the scope manipulation and he  assisted also the same physician, Dr. Sariah Jo in the past in his  sigmoidoscopy.   The site of the

## 2020-11-15 NOTE — BRIEF OP NOTE
1640   Securement device Yes 10/01/20 1640   Status Other (Comment) 10/01/20 1640   Placement Verified by External Catheter Length 10/02/20 0403   NG/OG/NJ/NE External Measurement (cm) 63 cm 10/02/20 0403   Drainage Appearance Tan 09/29/20 0400   Tube Feeding Semi-elemental 10/01/20 1640   Tube Feeding Status Continuous 10/02/20 0403   Rate/Schedule 35 mL/hr 10/02/20 0403   Tube Feeding Supplement (Product) Protein Modular 10/01/20 1640   Tube Feeding Supplement Amount (mL) 75 10/01/20 1640   Tube Feeding Intake (mL) 180 ml 10/01/20 1411   Free Water Flush (mL) 120 mL 10/02/20 0403   Residual Volume (ml) 20 ml 10/01/20 1640   Output (mL) 5 ml 09/30/20 0349       [REMOVED] NG/OG/NJ/NE Tube Orogastric (Removed)   Surrounding Skin Dry; Intact 10/15/20 0445   Securement device Yes 10/15/20 0445   Status Other (Comment) 10/15/20 0445   Placement Verified by Gastric Contents;by External Catheter Length;by Respiratory Status 10/15/20 0445   NG/OG/NJ/NE External Measurement (cm) 70 cm 10/15/20 0445   Drainage Appearance None 10/15/20 0445   Tube Feeding Semi-elemental 10/15/20 0445   Tube Feeding Status Continuous 10/15/20 0445   Rate/Schedule 20 mL/hr 10/15/20 0445   Tube Feeding Supplement (Product) Protein Modular 10/14/20 1449   Tube Feeding Supplement Amount (mL) 75 10/14/20 1449   Tube Feeding Intake (mL) 285 ml 10/15/20 1200   Free Water Flush (mL) 300 mL 10/15/20 1200   Free Water Rate 200q6 hrs  10/15/20 0445   Residual Volume (ml) 0 ml 10/15/20 0445   Output (mL) 0 ml 10/12/20 0359       [REMOVED] Rectal Tube With balloon (Removed)   Stool Appearance Loose; Watery 10/12/20 0359   Stool Color Brown 10/12/20 0359   Stool Amount Smear 10/12/20 0359   Rectal Tube Output 0 ml 10/12/20 0359       [REMOVED] Urethral Catheter Coude 14 fr (Removed)   $ Urethral catheter insertion $ Not inserted for procedure 09/26/20 8589   Catheter Indications Need for fluid management in critically ill patients in a critical care setting not able to be managed by other means such as BSC with hat, bedpan, urinal, condom catheter, or short term intermittent urethral catherization 10/05/20 1551   Site Assessment No urethral drainage 10/05/20 0645   Urine Color Tea 10/05/20 2136   Urine Appearance Cloudy 10/05/20 2136   Urine Odor Malodorous 09/26/20 1415   Output (mL) 50 mL 10/05/20 2136       [REMOVED] Urethral Catheter Temperature probe (Removed)   $ Urethral catheter insertion $ Not inserted for procedure 10/18/20 2011   Catheter Indications Need for fluid management in critically ill patients in a critical care setting not able to be managed by other means such as BSC with hat, bedpan, urinal, condom catheter, or short term intermittent urethral catherization 10/19/20 1615   Site Assessment Pink 10/19/20 1615   Urine Color Yellow 10/19/20 1615   Urine Appearance Clear 10/19/20 1615   Output (mL) 400 mL 10/19/20 1800       [REMOVED] Urethral Catheter 16 fr (Removed)   $ Urethral catheter insertion $ Not inserted for procedure 10/20/20 1200   Catheter Indications Acute urinary retention/obstruction 11/02/20 2119   Site Assessment Pink; No urethral drainage 11/02/20 2119   Urine Color Yellow 11/02/20 2119   Urine Appearance Sediment 11/02/20 2119   Output (mL) 650 mL 11/02/20 2119       Findings:  healing perianal ulcers, diverticulosis and small healing focal colitis in the ascending .     Electronically signed by Merrill Auguste MD on 11/15/2020 at 9:05 AM

## 2020-11-15 NOTE — PROGRESS NOTES
Pt here for colonoscopy. Scope  used. Pictures taken. Cold forcep biopsy taken in 1 jar. Procedure completed and tolerated well.

## 2020-11-15 NOTE — OP NOTE
[address] [place] [address]                                OPERATIVE REPORT    PATIENT NAME: Avi King                :        1970  MED REC NO:   [id number]                           ROOM:       0022  ACCOUNT NO:   [de-identified]                           ADMIT DATE: 2020  PROVIDER:     JASMYN Streeter Query:  2020    AUTO PAN:  [id number]. VERIFY CC INFO. INDICATION:  The patient is with history of melenic stool as well as  bright red blood per rectum. Plan today for EGD to evaluate. ASA CLASSIFICATION:  III. SURGEON:  Sea Noland MD.    Estimated blood loss in none. DESCRIPTION OF THE PROCEDURE:  The patient was brought to the GI lab. Consent was obtained. The risks involved with the procedure were  explained to the patient. Informed consent was obtained. The patient  was monitored during the procedure with pulse oximetry, blood pressure  monitoring, and oxygen by nasal cannula. Sedation by incremental doses  of IV Versed, total 3 mg of Versed and 75 mcg of fentanyl given in  incremental dosage during the procedure to achieve continuous conscious  sedation. PROCEDURE PERFORMED:  EGD:  A standard video 190 Olympus upper scope was  advanced under direct vision from the oral cavity up to third part of  the duodenum. Esophagus showed feature of mild acid reflux in the  distal esophagus. No erosion or ulceration seen. Scope advanced to the  stomach. Retroflex examination of the cardia revealed normal cardia. Mild gastritis seen in the distal part of the antrum, not very  significant. Duodenum appears normal. No GI bleeding seen. No erosion  or ulceration seen. The scope was withdrawn with no immediate  complications. IMPRESSION:  1. Mild acid reflux. 2.  Mild gastritis. PLAN:  1. Continue PPI.   2.  Colonoscopy will be scheduled tomorrow to complete GI evaluation.         Valeria Scruggs M.D.    D: 11/14/2020 14:06:44       T: 11/14/2020 19:52:58     [email or web address]  Job#: [id number]     Doc#: [id number]    CC:

## 2020-11-15 NOTE — PRE SEDATION
[address] [place] [address]  Sedation/Analgesia History & Physical    Patient: Therese Sam: 1970  Med Rec#: [id number] Acc#: [id number]   Provider Performing Procedure: Michelle Good  Primary Care Physician: No primary care provider on file. PRE-PROCEDURE   Full CODE [x]Yes  DNR-CCA/DNR-CC []Yes   Brief History/Pre-Procedure Diagnosis:Melena , lower GI bleeding and history of perrectal ulcers        MEDICAL HISTORY  []CAD/Valve  []Liver Disease  []Lung Disease []Diabetes  []Hypertension []Renal Disease  []Additional information:       has a past medical history of Aortic stenosis, mild to moderate, BPH (benign prostatic hyperplasia), Carpal tunnel syndrome on right, Chronic gout, DM2 (diabetes mellitus, type 2) ([facility] [address] Dyslipidemia, GERD (gastroesophageal reflux disease), Heart failure with preserved ejection fraction ([facility] [address] History of alcohol abuse, History of atrial fibrillation, History of osteomyelitis, Hypertension, essential, Hypogonadism, male, Major depression, Mood disorder ([facility] [address] Morbid obesity ([facility] [address] DURAN (nonalcoholic steatohepatitis), KIARA treated with BiPAP, and Vitamin D deficiency. SURGICAL HISTORY   has a past surgical history that includes tracheostomy; Foot surgery (Right); hip surgery (Left, 6/29/2020); and sigmoidoscopy (N/A, 10/26/2020).   Additional information:       ALLERGIES   Allergies as of 11/07/2020 - Review Complete 11/07/2020   Allergen Reaction Noted    Pcn [penicillins]  10/19/2011     Additional information:       MEDICATIONS   Coumadin Use Last 7 Days [x]No []Yes  Antiplatelet drug therapy use last 7 days  [x]No []Yes  Other anticoagulant use last 7 days  [x]No []Yes    Current Facility-Administered Medications:     midazolam (VERSED) 5 MG/5ML injection, , , ,     fentaNYL (SUBLIMAZE) 100 MCG/2ML injection, , , ,     dextrose 5 % and 0.45 % sodium chloride infusion, , Intravenous, Continuous, SANDRA Mccarty - CNP, Last Rate: 75 mL/hr at 11/14/20 0602    sodium chloride flush 0.9 % injection 10 mL, 10 mL, Intravenous, 2 times per day, Hector Koehler MD, 10 mL at 11/13/20 2138    sodium chloride flush 0.9 % injection 10 mL, 10 mL, Intravenous, PRN, Mohsin Reza, MD    acetaminophen (TYLENOL) tablet 650 mg, 650 mg, Oral, Q6H PRN, 650 mg at 11/08/20 2047 **OR** acetaminophen (TYLENOL) suppository 650 mg, 650 mg, Rectal, Q6H PRN, Mohsin Reza, MD    polyethylene glycol (GLYCOLAX) packet 17 g, 17 g, Oral, Daily PRN, Mohsin Reza, MD    promethazine (PHENERGAN) tablet 12.5 mg, 12.5 mg, Oral, Q6H PRN **OR** ondansetron (ZOFRAN) injection 4 mg, 4 mg, Intravenous, Q6H PRN, Hector Koehler MD, 4 mg at 11/11/20 1004    [Held by provider] enoxaparin (LOVENOX) injection 40 mg, 40 mg, Subcutaneous, Daily, Hector Koehler MD    magnesium sulfate 2 g in 50 mL IVPB premix, 2 g, Intravenous, PRN, Hector Koehler MD, Stopped at 11/14/20 1109    potassium chloride (KLOR-CON M) extended release tablet 40 mEq, 40 mEq, Oral, PRN, 40 mEq at 11/14/20 0702 **OR** potassium bicarb-citric acid (EFFER-K) effervescent tablet 40 mEq, 40 mEq, Oral, PRN **OR** potassium chloride 10 mEq/100 mL IVPB (Peripheral Line), 10 mEq, Intravenous, PRN, Mohsin Reza, MD    albuterol sulfate  (90 Base) MCG/ACT inhaler 2 puff, 2 puff, Inhalation, Q6H PRN, Mohsin Reza, MD    allopurinol (ZYLOPRIM) tablet 500 mg, 500 mg, Oral, Daily, Mohsin Reza, MD, 500 mg at 11/14/20 0853    ARIPiprazole (ABILIFY) tablet 15 mg, 15 mg, Oral, Daily, Mohsin Reza, MD, 15 mg at 11/14/20 0853    atorvastatin (LIPITOR) tablet 40 mg, 40 mg, Oral, Nightly, Mohsin Reza, MD, 40 mg at 11/14/20 2208    benzocaine (ORAJEL) 10 % mucosal gel, , Mouth/Throat, 4x Daily PRN, Mohsin Reza, MD    busPIRone (BUSPAR) tablet 10 mg, 10 mg, Oral, BID, Mohsin Reza, MD, 10 mg at 11/14/20 2208    citalopram (CELEXA) tablet 30 mg, 30 mg, Oral, Daily, Mohsin Reza, MD, 30 mg at 11/14/20 0851    ferrous sulfate (IRON 325) tablet 325 mg, 325 mg, Oral, BID WC, Mohsin Reza, MD, 325 mg at 74/90/83 5611    folic acid (FOLVITE) tablet 1 mg, 1 mg, Oral, Daily, Mohsin Reza, MD, 1 mg at 11/14/20 0851    furosemide (LASIX) tablet 40 mg, 40 mg, Oral, Daily, Mohsin Reza, MD, 40 mg at 11/14/20 0371    gabapentin (NEURONTIN) capsule 400 mg, 400 mg, Oral, BID, Mohsin Reza, MD, 400 mg at 11/14/20 2208    glycopyrrolate-formoterol (BEVESPI) 9-4.8 MCG/ACT inhaler 2 puff, 2 puff, Inhalation, BID, Mohsin Reza, MD, 2 puff at 11/14/20 2030    guaiFENesin (ROBITUSSIN) 100 MG/5ML oral solution 200 mg, 200 mg, Oral, Q4H PRN, Mohsin Reza, MD    hydrALAZINE (APRESOLINE) tablet 50 mg, 50 mg, Oral, BID, Mohsin Reza, MD, 50 mg at 11/14/20 2210    [Held by provider] insulin glargine (LANTUS) injection vial 10 Units, 10 Units, Subcutaneous, Nightly, Mohsin Reza, MD    modafinil (PROVIGIL) tablet 100 mg, 100 mg, Oral, Daily, Mohsin Reza, MD, 100 mg at 11/14/20 0853    therapeutic multivitamin-minerals 1 tablet, 1 tablet, Oral, Daily, Mohsin Reza, MD, 1 tablet at 11/13/20 0931    ondansetron (ZOFRAN) tablet 4 mg, 4 mg, Oral, Q8H PRN, Mohsin Reza, MD    pantoprazole (PROTONIX) tablet 40 mg, 40 mg, Oral, Daily, Mohsin Reza, MD, Stopped at 11/15/20 0700    senna (SENOKOT) tablet 8.6 mg, 1 tablet, Oral, BID, Mohsin Reza, MD, 8.6 mg at 11/14/20 2209    [Held by provider] tamsulosin (FLOMAX) capsule 0.4 mg, 0.4 mg, Oral, Nightly, Mohsin Reza, MD, 0.4 mg at 11/12/20 2024    insulin lispro (HUMALOG) injection vial 0-18 Units, 0-18 Units, Subcutaneous, TID WC, Mohsin Reza, MD, Stopped at 11/14/20 1314    insulin lispro (HUMALOG) injection vial 0-9 Units, 0-9 Units, Subcutaneous, Nightly, Mohsin Reza, MD, Stopped at 11/09/20 2236    glucose (GLUTOSE) 40 % oral gel 15 g, 15 g, Oral, PRN, Mohsin Reza, MD    dextrose 50 % IV solution, 12.5 g, Intravenous, PRN, Mohsin Reza, MD    glucagon (rDNA) injection 1 mg, 1 mg, Intramuscular, PRN, Mohsin Reza, MD    dextrose 5 % solution, 100 mL/hr, Intravenous, PRN, Mohsin Reza, MD    polyethylene glycol (GLYCOLAX) packet 17 g, 17 g, Oral, Every Other Day, Mohsin Reza, MD    cefTRIAXone (ROCEPHIN) 1 g IVPB in 50 mL D5W minibag, 1 g, Intravenous, Q24H, Mohsin Reza, MD, Stopped at 11/15/20 0042    metronidazole (FLAGYL) 500 mg in NaCl 100 mL IVPB premix, 500 mg, Intravenous, Q8H, Kaleb Ziegler MD, Stopped at 11/15/20 0147  Prior to Admission medications    Medication Sig Start Date End Date Taking? Authorizing Provider   glycopyrrolate-formoterol (BEVESPI) 9-4.8 MCG/ACT AERO Inhale 2 puffs into the lungs 2 times daily 11/6/20  Yes Des Hernadez MD   ferrous sulfate (IRON 325) 325 (65 Fe) MG tablet Take 1 tablet by mouth 2 times daily (with meals) 11/6/20  Yes Des Hernadez MD   modafinil (PROVIGIL) 100 MG tablet Take 1 tablet by mouth daily for 30 days. TAKE IN THE MORNING. 11/7/20 12/7/20 Yes Des Hernadez MD   gabapentin (NEURONTIN) 400 MG capsule Take 1 capsule by mouth 2 times daily for 180 days.  11/6/20 5/5/21 Yes Des Hernadez MD   vitamin C (ASCORBIC ACID) 500 MG tablet Take 2 tablets by mouth daily 9/15/20  Yes SANDRA Maddox CNP   aspirin 81 MG chewable tablet Take 1 tablet by mouth daily 9/16/20  Yes SANDRA Maddox CNP   CHOLECALCIFEROL PO Take 2,000 Units by mouth daily   Yes Historical Provider, MD   atorvastatin (LIPITOR) 40 MG tablet Take 40 mg by mouth nightly   Yes Historical Provider, MD   acetaminophen (TYLENOL) 325 MG tablet Take 650 mg by mouth every 4 hours as needed for Pain   Yes Historical Provider, MD   guaiFENesin (ROBITUSSIN) 100 MG/5ML syrup Take 200 mg by mouth every 4 hours as needed for Cough   Yes Historical Provider, MD   busPIRone (BUSPAR) 10 MG tablet Take 10 mg by mouth 2 times daily    Yes Historical Provider, MD   pantoprazole (PROTONIX) 40 MG tablet Take 1 tablet by mouth daily 5/22/20  Yes Jennifer Mccollum MD   allopurinol (ZYLOPRIM) 300 MG tablet Take 1 tablet by mouth daily  Patient taking differently: Take 500 mg by mouth daily  4/9/20  Yes Tori Escobedo DO   hydrALAZINE (APRESOLINE) 50 MG tablet Take 50 mg by mouth 2 times daily    Yes Historical Provider, MD   polyethylene glycol (GLYCOLAX) powder Take 17 g by mouth every other day    Yes Historical Provider, MD   Multiple Vitamins-Minerals (THERAPEUTIC MULTIVITAMIN-MINERALS) tablet Take 1 tablet by mouth daily   Yes Historical Provider, MD   Probiotic Product (SOBIA-BID PROBIOTIC PO) Take 1 tablet by mouth daily    Yes Historical Provider, MD   tamsulosin (FLOMAX) 0.4 MG capsule Take 0.4 mg by mouth nightly    Yes Historical Provider, MD   folic acid (FOLVITE) 1 MG tablet Take 1 mg by mouth daily   Yes Historical Provider, MD   furosemide (LASIX) 40 MG tablet Take 40 mg by mouth daily   Yes Historical Provider, MD   insulin lispro (HUMALOG) 100 UNIT/ML injection vial Inject into the skin 3 times daily (before meals) Per scale: 151-200=1 unit, 201-250=2 units, 251-300-3 units, 301-350=4 units, 351-400=5 units. Yes Historical Provider, MD   sitaGLIPtan-metformin (JANUMET)  MG per tablet Take 1 tablet by mouth 2 times daily (with meals)   Yes Historical Provider, MD   insulin glargine (LANTUS) 100 UNIT/ML injection vial Inject 10 Units into the skin nightly    Yes Historical Provider, MD   senna (SENOKOT) 8.6 MG tablet Take 1 tablet by mouth 2 times daily   Yes Historical Provider, MD   ARIPiprazole (ABILIFY PO) Take 15 mg by mouth daily    Yes Historical Provider, MD   Citalopram Hydrobromide (CELEXA PO) Take 30 mg by mouth daily From [facility] professional services    Yes Historical Provider, MD   albuterol sulfate HFA (PROVENTIL HFA) 108 (90 Base) MCG/ACT inhaler Inhale 2 puffs into the lungs every 6 hours as needed for Wheezing 11/6/20   Sabiha Ponce MD   benzocaine (BABY ORAJEL) 7.5 % oral gel Take by mouth 4 times daily as needed for Pain (mouth pain) Take by mouth 3 times daily.     Historical Provider, MD   Trolamine Salicylate (ASPERCREME) 10 % LOTN Apply topically as needed for Pain 5/22/20   Lisset Merida MD   lidocaine (XYLOCAINE) 5 % ointment Apply topically as needed to painful areas on lower back, hips, knees, and feet 3/11/20   SANDRA Wilcox - CNP   Wound Dressings (MAXORB EX) Apply topically    Historical Provider, MD   Diaper Rash Products (PINXAV) OINT Apply topically    Historical Provider, MD   ondansetron (ZOFRAN) 4 MG tablet Take 4 mg by mouth every 8 hours as needed for Nausea or Vomiting    Historical Provider, MD   Blood Glucose Monitoring Suppl (FREESTYLE LITE) ARTIE Patient needs all supplies for qd testing. DX: 250.00 8/18/11   Mary Jane Fischer MD     Additional information:       PHYSICAL:   Heart:  [x]Regular rate and rhythm  []Other:    Lungs:  [x]Clear    []Other:    Abdomen: [x]Soft    []Other:    Mental Status: [x]Alert & Oriented  []Other:      VITAL SIGNS   Patient Vitals for the past 24 hrs:   BP Temp Temp src Pulse Resp SpO2   11/15/20 0744 139/84 97.3 °F (36.3 °C) Temporal 74 18 93 %   11/15/20 0345 129/70 98.2 °F (36.8 °C) Oral 81 18 93 %   11/15/20 0120 -- -- -- -- 18 100 %   11/15/20 0015 124/83 98.4 °F (36.9 °C) Oral 80 16 99 %   11/14/20 2130 108/66 99 °F (37.2 °C) Oral 71 16 99 %   11/14/20 1608 125/64 98.5 °F (36.9 °C) Oral 77 16 96 %   11/14/20 1412 (!) 92/58 -- -- 76 -- --   11/14/20 1402 (!) 100/56 -- -- 75 16 99 %   11/14/20 1323 122/71 -- -- 80 18 95 %   11/14/20 1306 (!) 147/91 -- -- 104 16 99 %   11/14/20 1100 -- -- -- -- -- 100 %       PLANNED PROCEDURE   []EGD  [x]Colonoscopy []Flex Sigmoid  []ERCP []EUS   []Cystoscopy  [] CATH [] BRONCH   Consent: I have discussed with the patient and/or the patient representative the indication, alternatives, and the possible risks and/or complications of the planned procedure and the anesthesia methods.  The patient and/or patient representative appear to understand and agree to proceed. SEDATION  Planned agent:[x]Midazolam []Meperidine [x]Sublimaze []Morphine  []Diazepam  []Other:     ASA Classification: Class 3 - A patient with severe systemic disease that limits activity but is not incapacitating    Airway Assessment: Mallampati Class II - (soft palate, fauces & uvula are visible)    Monitoring and Safety: The patient will be placed on a cardiac monitor and vital signs, pulse oximetry and level of consciousness will be continuously evaluated throughout the procedure. The patient will be closely monitored until recovery from the medications is complete and the patient has returned to baseline status. Respiratory therapy will be on standby during the procedure. [x]Pre-procedure diagnostic studies complete and results available. Comment:    [x]Previous sedation/anesthesia experiences assessed. Comment:    [x]The patient is an appropriate candidate to undergo the planned procedure sedation and anesthesia. (Refer to nursing sedation/analgesia documentation record)  [x]Formulation and discussion of sedation/procedure plan, risks, and expectations with patient and/or responsible adult completed. [x]Patient examined immediately prior to the procedure.  (Refer to nursing sedation/analgesia documentation record)    Artemio Cofefy MD   Electronically signed 11/15/2020 at 8:37 AM

## 2020-11-15 NOTE — PROGRESS NOTES
sulfate HFA, benzocaine, guaiFENesin, ondansetron, glucose, dextrose, glucagon (rDNA), dextrose      Intake/Output Summary (Last 24 hours) at 11/15/2020 1745  Last data filed at 11/15/2020 1500  Gross per 24 hour   Intake --   Output 2400 ml   Net -2400 ml       Diet:  DIET CARB CONTROL; Exam:  /73   Pulse 75   Temp 97.6 °F (36.4 °C) (Oral)   Resp 28   Ht 5' 7\" (1.702 m)   Wt 279 lb (126.6 kg)   SpO2 96%   BMI 43.70 kg/m²     General appearance: Alert and appropriate, pleasant morbidly obese adult male. No apparent distress, appears stated age and cooperative. HEENT: Pupils equal, round, and reactive to light. Conjunctivae/corneas clear. Neck: Supple, with full range of motion. No jugular venous distention. Trachea midline. Respiratory:  Normal respiratory effort. Clear to auscultation, bilaterally without Rales/Wheezes/Rhonchi. Cardiovascular: Regular rate and rhythm with normal S1/S2 without murmurs, rubs or gallops. Abdomen: Soft, non-tender, non-distended with normal bowel sounds. Multiple episodes of liquid stool noted. Musculoskeletal: Passive and active ROM x 4 extremities. Skin: Skin color, texture, turgor normal.  No rashes or lesions. Neurologic:  Neurovascularly intact without any focal sensory/motor deficits. Psychiatric: Alert and oriented, engaged in conversation appropriately. Thought content reasonable, decent insight  Capillary Refill: Brisk,< 3 seconds   Peripheral Pulses: +2 palpable, equal bilaterally     Labs:   Recent Labs     11/13/20  0652 11/14/20  0611 11/15/20  0654   WBC 8.2 7.9 7.7   HGB 10.2* 10.3* 10.7*   HCT 34.1* 33.7* 35.9*    335 337     Recent Labs     11/13/20  0652 11/14/20  0611 11/15/20  0654    147* 140   K 3.3* 3.2* 2.9*    106 101   CO2 28 30 28   BUN 5* 4* 4*   CREATININE 1.1 1.0 0.8   CALCIUM 9.3 9.2 9.4     No results for input(s): AST, ALT, BILIDIR, BILITOT, ALKPHOS in the last 72 hours.   No results for input(s): INR in the last 72 hours. No results for input(s): Grant Glover in the last 72 hours. Microbiology:    Blood culture #1:   Lab Results   Component Value Date    BC No growth-preliminary No growth  10/28/2020       Blood culture #2:No results found for: Becki Colin    Organism:  Lab Results   Component Value Date    ORG Staphylococcus (coagulase negative) 10/22/2020         Lab Results   Component Value Date    LABGRAM  10/12/2020     Moderate segmented neutrophils observed. Rare epithelial cells observed. Rare gram positive cocci occurring singly and in pairs. MRSA culture only:No results found for: [facility]    Urine culture:   Lab Results   Component Value Date    [facility]  10/06/2020     At least one of drugs in current antibiotic therapy is ineffective in vitro for isolate. LABURIN [place] count: >100,000 CFU/mL   10/06/2020       Respiratory culture: No results found for: CULTRESP    Aerobic and Anaerobic :  No results found for: LABAERO  No results found for: LABANAE    Urinalysis:      Lab Results   Component Value Date    NITRU NEGATIVE 11/03/2020    WBCUA 25-50 11/03/2020    BACTERIA FEW 11/03/2020    RBCUA 3-5 11/03/2020    BLOODU NEGATIVE 11/03/2020    SPECGRAV >=1.030 10/06/2020    GLUCOSEU NEGATIVE 11/03/2020       Radiology:  XR CHEST (2 VW)   Final Result   Low lung volumes with probable subsegmental atelectasis at the right midlung. **This report has been created using voice recognition software. It may contain minor errors which are inherent in voice recognition technology. **      Final report electronically signed by Dr. Dario Garcia MD on 11/13/2020 11:30 AM      CT ABDOMEN PELVIS W IV CONTRAST Additional Contrast? None   Final Result      1. Extensive thickening is seen in the region of the rectum. This is likely on the basis of colitis. 2. Persistence of extensive calcifications in the fascial planes throughout the abdomen and pelvis.  This could be on the basis of dermatomyositis. 3. Hepatic steatosis. 4. There is a 3 mm pulmonary nodule near the right lung base. Follow-up is recommended in 6-12 months to assess for stability. **This report has been created using voice recognition software. It may contain minor errors which are inherent in voice recognition technology. **      Final report electronically signed by Dr Og Luke on 11/7/2020 8:17 PM        Ct Abdomen Pelvis W Iv Contrast Additional Contrast? None    Result Date: 11/7/2020  PROCEDURE: CT ABDOMEN PELVIS W IV CONTRAST CLINICAL INFORMATION: 57-year-old male with rectal bleeding. Possible fistula. Abdominal pain . COMPARISON: CT scan 10/20/2020. TECHNIQUE: 5 mm axial CT images were obtained through the abdomen and pelvis after the administration of intravenous and oral contrast. Coronal and sagittal reconstructions were obtained. All CT scans at this facility use dose modulation, iterative reconstruction, and/or weight-based dosing when appropriate to reduce radiation dose to as low as reasonably achievable. FINDINGS: Atelectasis is noted at the bilateral lung bases. On axial image #1 there is a 3 mm pulmonary nodule in the anterior right lung. There is no pleural effusion. The base of the heart is within acceptable limits. The liver is hypoattenuated. This may be due to fatty infiltration. The gallbladder, pancreas, adrenal glands and spleen are within normal limits. There are some splenule seen in the left upper quadrant. The kidneys are symmetric in size, shape and degree of enhancement. There is no hydronephrosis. There is no evidence of a small bowel obstruction. There is circumferential wall thickening in the region of the rectum. There is a Dimas catheter within the lumen of the urinary bladder. The aorta and IVC are normal in caliber. Extensive calcifications are again seen in the fascial planes throughout the abdomen and pelvis. This is most notable in the right gluteal region.  This finding is similar to the previous exam. Haziness is seen throughout the superficial subcutaneous soft tissues of the ventral abdominal wall. This may be on the basis of previous injections. The osseous structures are intact. There are degenerative changes in the spine. No acute fractures. 1. Extensive thickening is seen in the region of the rectum. This is likely on the basis of colitis. 2. Persistence of extensive calcifications in the fascial planes throughout the abdomen and pelvis. This could be on the basis of dermatomyositis. 3. Hepatic steatosis. 4. There is a 3 mm pulmonary nodule near the right lung base. Follow-up is recommended in 6-12 months to assess for stability. **This report has been created using voice recognition software. It may contain minor errors which are inherent in voice recognition technology. ** Final report electronically signed by Dr Boni Thakkar on 11/7/2020 8:17 PM      **This report has been created using voice recognition software. It may contain minor errors which are inherent in voice recognition technology. **  Electronically signed by SANDRA Sims CNP on 11/15/2020 at 5:45 PM

## 2020-11-16 ENCOUNTER — APPOINTMENT (OUTPATIENT)
Dept: INTERVENTIONAL RADIOLOGY/VASCULAR | Age: 50
DRG: 919 | End: 2020-11-16
Payer: MEDICARE

## 2020-11-16 LAB
ANION GAP SERPL CALCULATED.3IONS-SCNC: 12 MEQ/L (ref 8–16)
BUN BLDV-MCNC: 4 MG/DL (ref 7–22)
CALCIUM SERPL-MCNC: 9.2 MG/DL (ref 8.5–10.5)
CHLORIDE BLD-SCNC: 101 MEQ/L (ref 98–111)
CO2: 27 MEQ/L (ref 23–33)
CREAT SERPL-MCNC: 0.8 MG/DL (ref 0.4–1.2)
ERYTHROCYTE [DISTWIDTH] IN BLOOD BY AUTOMATED COUNT: 16.1 % (ref 11.5–14.5)
ERYTHROCYTE [DISTWIDTH] IN BLOOD BY AUTOMATED COUNT: 16.2 % (ref 11.5–14.5)
ERYTHROCYTE [DISTWIDTH] IN BLOOD BY AUTOMATED COUNT: 55.3 FL (ref 35–45)
ERYTHROCYTE [DISTWIDTH] IN BLOOD BY AUTOMATED COUNT: 57.3 FL (ref 35–45)
GFR SERPL CREATININE-BSD FRML MDRD: > 90 ML/MIN/1.73M2
GLUCOSE BLD-MCNC: 111 MG/DL (ref 70–108)
GLUCOSE BLD-MCNC: 115 MG/DL (ref 70–108)
GLUCOSE BLD-MCNC: 119 MG/DL (ref 70–108)
GLUCOSE BLD-MCNC: 121 MG/DL (ref 70–108)
GLUCOSE BLD-MCNC: 125 MG/DL (ref 70–108)
HCT VFR BLD CALC: 35.1 % (ref 42–52)
HCT VFR BLD CALC: 35.4 % (ref 42–52)
HEMOGLOBIN: 10.8 GM/DL (ref 14–18)
HEMOGLOBIN: 10.8 GM/DL (ref 14–18)
MAGNESIUM: 1.5 MG/DL (ref 1.6–2.4)
MCH RBC QN AUTO: 28.9 PG (ref 26–33)
MCH RBC QN AUTO: 29.2 PG (ref 26–33)
MCHC RBC AUTO-ENTMCNC: 30.5 GM/DL (ref 32.2–35.5)
MCHC RBC AUTO-ENTMCNC: 30.8 GM/DL (ref 32.2–35.5)
MCV RBC AUTO: 93.9 FL (ref 80–94)
MCV RBC AUTO: 95.7 FL (ref 80–94)
PLATELET # BLD: 352 THOU/MM3 (ref 130–400)
PLATELET # BLD: 361 THOU/MM3 (ref 130–400)
PMV BLD AUTO: 10.8 FL (ref 9.4–12.4)
PMV BLD AUTO: 10.9 FL (ref 9.4–12.4)
POTASSIUM REFLEX MAGNESIUM: 2.8 MEQ/L (ref 3.5–5.2)
RBC # BLD: 3.7 MILL/MM3 (ref 4.7–6.1)
RBC # BLD: 3.74 MILL/MM3 (ref 4.7–6.1)
SODIUM BLD-SCNC: 140 MEQ/L (ref 135–145)
WBC # BLD: 6.9 THOU/MM3 (ref 4.8–10.8)
WBC # BLD: 8.2 THOU/MM3 (ref 4.8–10.8)

## 2020-11-16 PROCEDURE — 83735 ASSAY OF MAGNESIUM: CPT

## 2020-11-16 PROCEDURE — 93971 EXTREMITY STUDY: CPT

## 2020-11-16 PROCEDURE — 2580000003 HC RX 258: Performed by: FAMILY MEDICINE

## 2020-11-16 PROCEDURE — 6370000000 HC RX 637 (ALT 250 FOR IP): Performed by: NURSE PRACTITIONER

## 2020-11-16 PROCEDURE — 94761 N-INVAS EAR/PLS OXIMETRY MLT: CPT

## 2020-11-16 PROCEDURE — 99232 SBSQ HOSP IP/OBS MODERATE 35: CPT | Performed by: NURSE PRACTITIONER

## 2020-11-16 PROCEDURE — 82948 REAGENT STRIP/BLOOD GLUCOSE: CPT

## 2020-11-16 PROCEDURE — 97110 THERAPEUTIC EXERCISES: CPT

## 2020-11-16 PROCEDURE — 1200000003 HC TELEMETRY R&B

## 2020-11-16 PROCEDURE — 94660 CPAP INITIATION&MGMT: CPT

## 2020-11-16 PROCEDURE — 6370000000 HC RX 637 (ALT 250 FOR IP): Performed by: FAMILY MEDICINE

## 2020-11-16 PROCEDURE — 36415 COLL VENOUS BLD VENIPUNCTURE: CPT

## 2020-11-16 PROCEDURE — 2580000003 HC RX 258: Performed by: NURSE PRACTITIONER

## 2020-11-16 PROCEDURE — 85027 COMPLETE CBC AUTOMATED: CPT

## 2020-11-16 PROCEDURE — 80048 BASIC METABOLIC PNL TOTAL CA: CPT

## 2020-11-16 PROCEDURE — 2500000003 HC RX 250 WO HCPCS: Performed by: FAMILY MEDICINE

## 2020-11-16 PROCEDURE — 6360000002 HC RX W HCPCS: Performed by: NURSE PRACTITIONER

## 2020-11-16 PROCEDURE — 85730 THROMBOPLASTIN TIME PARTIAL: CPT

## 2020-11-16 PROCEDURE — 94640 AIRWAY INHALATION TREATMENT: CPT

## 2020-11-16 RX ORDER — HEPARIN SODIUM 1000 [USP'U]/ML
39 INJECTION, SOLUTION INTRAVENOUS; SUBCUTANEOUS PRN
Status: DISCONTINUED | OUTPATIENT
Start: 2020-11-16 | End: 2020-11-17

## 2020-11-16 RX ORDER — HEPARIN SODIUM 1000 [USP'U]/ML
79 INJECTION, SOLUTION INTRAVENOUS; SUBCUTANEOUS ONCE
Status: COMPLETED | OUTPATIENT
Start: 2020-11-16 | End: 2020-11-16

## 2020-11-16 RX ORDER — HEPARIN SODIUM 1000 [USP'U]/ML
79 INJECTION, SOLUTION INTRAVENOUS; SUBCUTANEOUS PRN
Status: DISCONTINUED | OUTPATIENT
Start: 2020-11-16 | End: 2020-11-17

## 2020-11-16 RX ORDER — HEPARIN SODIUM 10000 [USP'U]/100ML
16 INJECTION, SOLUTION INTRAVENOUS CONTINUOUS
Status: DISCONTINUED | OUTPATIENT
Start: 2020-11-16 | End: 2020-11-17

## 2020-11-16 RX ORDER — TRAMADOL HYDROCHLORIDE 50 MG/1
50 TABLET ORAL EVERY 6 HOURS PRN
Status: DISCONTINUED | OUTPATIENT
Start: 2020-11-16 | End: 2020-11-19 | Stop reason: HOSPADM

## 2020-11-16 RX ADMIN — FOLIC ACID 1 MG: 1 TABLET ORAL at 07:37

## 2020-11-16 RX ADMIN — PANTOPRAZOLE SODIUM 40 MG: 40 TABLET, DELAYED RELEASE ORAL at 06:20

## 2020-11-16 RX ADMIN — CITALOPRAM 30 MG: 20 TABLET, FILM COATED ORAL at 07:39

## 2020-11-16 RX ADMIN — FUROSEMIDE 40 MG: 40 TABLET ORAL at 07:38

## 2020-11-16 RX ADMIN — GLYCOPYRROLATE AND FORMOTEROL FUMARATE 2 PUFF: 9; 4.8 AEROSOL, METERED RESPIRATORY (INHALATION) at 09:06

## 2020-11-16 RX ADMIN — ARIPIPRAZOLE 15 MG: 15 TABLET ORAL at 07:36

## 2020-11-16 RX ADMIN — STANDARDIZED SENNA CONCENTRATE 8.6 MG: 8.6 TABLET ORAL at 09:42

## 2020-11-16 RX ADMIN — HEPARIN SODIUM 16 UNITS/KG/HR: 10000 INJECTION, SOLUTION INTRAVENOUS at 16:24

## 2020-11-16 RX ADMIN — ATORVASTATIN CALCIUM 40 MG: 40 TABLET, FILM COATED ORAL at 22:06

## 2020-11-16 RX ADMIN — HYDRALAZINE HYDROCHLORIDE 50 MG: 50 TABLET, FILM COATED ORAL at 22:06

## 2020-11-16 RX ADMIN — SODIUM CHLORIDE, PRESERVATIVE FREE 10 ML: 5 INJECTION INTRAVENOUS at 09:43

## 2020-11-16 RX ADMIN — TRAMADOL HYDROCHLORIDE 50 MG: 50 TABLET ORAL at 14:39

## 2020-11-16 RX ADMIN — FERROUS SULFATE TAB 325 MG (65 MG ELEMENTAL FE) 325 MG: 325 (65 FE) TAB at 07:37

## 2020-11-16 RX ADMIN — METRONIDAZOLE 500 MG: 500 INJECTION, SOLUTION INTRAVENOUS at 09:45

## 2020-11-16 RX ADMIN — MODAFINIL 100 MG: 100 TABLET ORAL at 09:41

## 2020-11-16 RX ADMIN — HYDRALAZINE HYDROCHLORIDE 50 MG: 50 TABLET, FILM COATED ORAL at 09:42

## 2020-11-16 RX ADMIN — HEPARIN SODIUM 10000 UNITS: 1000 INJECTION INTRAVENOUS; SUBCUTANEOUS at 16:17

## 2020-11-16 RX ADMIN — DEXTROSE AND SODIUM CHLORIDE: 5; 450 INJECTION, SOLUTION INTRAVENOUS at 09:52

## 2020-11-16 RX ADMIN — BUSPIRONE HYDROCHLORIDE 10 MG: 10 TABLET ORAL at 22:06

## 2020-11-16 RX ADMIN — ALLOPURINOL 500 MG: 300 TABLET ORAL at 07:35

## 2020-11-16 RX ADMIN — FERROUS SULFATE TAB 325 MG (65 MG ELEMENTAL FE) 325 MG: 325 (65 FE) TAB at 16:31

## 2020-11-16 RX ADMIN — BUSPIRONE HYDROCHLORIDE 10 MG: 10 TABLET ORAL at 09:42

## 2020-11-16 RX ADMIN — METRONIDAZOLE 500 MG: 500 INJECTION, SOLUTION INTRAVENOUS at 01:12

## 2020-11-16 RX ADMIN — GABAPENTIN 400 MG: 400 CAPSULE ORAL at 09:43

## 2020-11-16 RX ADMIN — GABAPENTIN 400 MG: 400 CAPSULE ORAL at 22:06

## 2020-11-16 RX ADMIN — MULTIPLE VITAMINS W/ MINERALS TAB 1 TABLET: TAB at 07:38

## 2020-11-16 RX ADMIN — METRONIDAZOLE 500 MG: 500 INJECTION, SOLUTION INTRAVENOUS at 16:11

## 2020-11-16 ASSESSMENT — PAIN SCALES - GENERAL
PAINLEVEL_OUTOF10: 5
PAINLEVEL_OUTOF10: 4
PAINLEVEL_OUTOF10: 5

## 2020-11-16 ASSESSMENT — PAIN DESCRIPTION - ORIENTATION: ORIENTATION: LEFT

## 2020-11-16 ASSESSMENT — PAIN DESCRIPTION - PAIN TYPE: TYPE: CHRONIC PAIN;ACUTE PAIN

## 2020-11-16 ASSESSMENT — PAIN DESCRIPTION - LOCATION: LOCATION: FOOT

## 2020-11-16 NOTE — PROGRESS NOTES
Hospitalist Progress Note      Patient:  Darice Carrel    Unit/Bed:5K-22/022-A  YOB: 1970  MRN: [id number]   Acct: [de-identified]   PCP: No primary care provider on file. Date of Admission: 11/7/2020    Assessment/Plan:    1. Perirectal ulceration with bleeding. Flexiseal likely contributed. - surgery seen, discussed with ID. No surgical plans. ID signed off.   - CT abd shows colitis  - holding asa and Lovenox  -Continue zinc oxide ointment and cleaning of the perianal area after bowel movements.  -Wound/ostomy consulted. -GI seen. -EGD 11/14 mild acid reflux and gastritis. -Colonoscopy today with healing perianal ulceration, focal colitis in the ascending colon. -? Ok to stop ATB awaiting recs from GI.      2. Acute LLE pain. - Obtain Venous doppler r/o DVT. -Ultram PRN. 3. Chronic Resp failure/KIARA  - use bipap at night, and during naps. - avoid sedative meds  - continue modafinil  - continuous pulse ox       4. Urinary retention   - mason in place. -OP follow up with Urology.      5. S/p sepsis/covid 19 pna/ prolonged hosp stay    6. S/p Dysphagia    7. IDDM. BS trend remains acceptable. - Holding home Lantus  - Hypoglycemia protocol, ac&hs checks    8. Morbid obesity: BMI 43.7    9. Hypokalemia, replacing. .     10. Hypomagnesemia, replace     11. Hypernatremia, resolved. Monitor. 12. Macrocytic anemia. Hbg stable at 10.2. 13. Deconditioning. PT/OT    14. Orthostatic hypotension, resolved. S/p IVF bolus 11/13.     Dispo: taking orals well. Stop IVF. Chief Complaint: Rectal bleed    Initial H and P:-    **From Chart Review:  \"51 y.o. male with multiple co-morbidities, with a prolonged hospitalization with covid pna, and multiple issues, discharged on 11/7 to [facility] who presented to [address] [place] [address] with rectal bleeding.  Patient had similar issues while admitted but at the [facility], patient was having rectal bleed, drainage and pain in the rectum. Per ER, pt has swelling in the area, discharge and redness. Case was discussed with general surgery who recommend admission and consult to surgery. Patients labs were not concerning and actually better than when he was discharged. His hgb is improved as well. Pt was given rocephin and flagyl in the ER, and admitted to the floor with consults to Surgery. \"    Subjective (past 24 hours): Complaints of acute LLE pain around calf area. States its been there since admission, first time he has reported this to this provider. No N/V. Denies dizziness.      Medications:  Reviewed    Infusion Medications    dextrose 5 % and 0.45 % NaCl 75 mL/hr at 11/16/20 7242    dextrose       Scheduled Medications    sodium chloride flush  10 mL Intravenous 2 times per day    [Held by provider] enoxaparin  40 mg Subcutaneous Daily    allopurinol  500 mg Oral Daily    ARIPiprazole  15 mg Oral Daily    atorvastatin  40 mg Oral Nightly    busPIRone  10 mg Oral BID    citalopram  30 mg Oral Daily    ferrous sulfate  325 mg Oral BID WC    folic acid  1 mg Oral Daily    furosemide  40 mg Oral Daily    gabapentin  400 mg Oral BID    glycopyrrolate-formoterol  2 puff Inhalation BID    hydrALAZINE  50 mg Oral BID    [Held by provider] insulin glargine  10 Units Subcutaneous Nightly    modafinil  100 mg Oral Daily    therapeutic multivitamin-minerals  1 tablet Oral Daily    pantoprazole  40 mg Oral Daily    senna  1 tablet Oral BID    [Held by provider] tamsulosin  0.4 mg Oral Nightly    insulin lispro  0-18 Units Subcutaneous TID WC    insulin lispro  0-9 Units Subcutaneous Nightly    polyethylene glycol  17 g Oral Every Other Day    cefTRIAXone (ROCEPHIN) IV  1 g Intravenous Q24H    metroNIDAZOLE  500 mg Intravenous Q8H     PRN Meds: sodium chloride flush, acetaminophen **OR** acetaminophen, polyethylene glycol, promethazine **OR** ondansetron, magnesium sulfate, potassium chloride **OR** potassium alternative oral replacement **OR** potassium chloride, albuterol sulfate HFA, benzocaine, guaiFENesin, ondansetron, glucose, dextrose, glucagon (rDNA), dextrose      Intake/Output Summary (Last 24 hours) at 11/16/2020 1136  Last data filed at 11/16/2020 0349  Gross per 24 hour   Intake 1668 ml   Output 1950 ml   Net -282 ml       Diet:  DIET CARB CONTROL; Exam:  /68   Pulse 74   Temp 97.8 °F (36.6 °C) (Oral)   Resp 15   Ht 5' 7\" (1.702 m)   Wt 279 lb (126.6 kg)   SpO2 96%   BMI 43.70 kg/m²     General appearance: Alert and appropriate, pleasant morbidly obese adult male. No apparent distress, appears stated age and cooperative. HEENT: Pupils equal, round, and reactive to light. Conjunctivae/corneas clear. Neck: Supple, with full range of motion. No jugular venous distention. Trachea midline. Respiratory:  Normal respiratory effort. Clear to auscultation, bilaterally without Rales/Wheezes/Rhonchi. Cardiovascular: Regular rate and rhythm with normal S1/S2 without murmurs, rubs or gallops. Abdomen: Soft, non-tender, non-distended with normal bowel sounds. Multiple episodes of liquid stool noted. Musculoskeletal: Passive and active ROM x 4 extremities. Skin: Skin color, texture, turgor normal.  No rashes or lesions. Neurologic:  Neurovascularly intact without any focal sensory/motor deficits. Psychiatric: Alert and oriented, engaged in conversation appropriately.  Thought content reasonable, decent insight  Capillary Refill: Brisk,< 3 seconds   Peripheral Pulses: +2 palpable, equal bilaterally     Labs:   Recent Labs     11/14/20  0611 11/15/20  0654 11/16/20  0842   WBC 7.9 7.7 6.9   HGB 10.3* 10.7* 10.8*   HCT 33.7* 35.9* 35.4*    337 352     Recent Labs     11/14/20  0611 11/15/20  0654 11/16/20  0842   * 140 140   K 3.2* 2.9* 2.8*    101 101   CO2 30 28 27   BUN 4* 4* 4*   CREATININE 1.0 0.8 0.8   CALCIUM 9.2 9.4 9.2     No results for input(s): AST, ALT, BILIDIR, BILITOT, ALKPHOS in the last 72 hours. No results for input(s): INR in the last 72 hours. No results for input(s): Evelyn Belts in the last 72 hours. Microbiology:    Blood culture #1:   Lab Results   Component Value Date    BC No growth-preliminary No growth  10/28/2020       Blood culture #2:No results found for: Elvira Browne    Organism:  Lab Results   Component Value Date    ORG Staphylococcus (coagulase negative) 10/22/2020         Lab Results   Component Value Date    LABGRAM  10/12/2020     Moderate segmented neutrophils observed. Rare epithelial cells observed. Rare gram positive cocci occurring singly and in pairs. MRSA culture only:No results found for: [facility]    Urine culture:   Lab Results   Component Value Date    Zaki Han  10/06/2020     At least one of drugs in current antibiotic therapy is ineffective in vitro for isolate. LABURIN [place] count: >100,000 CFU/mL   10/06/2020       Respiratory culture: No results found for: CULTRESP    Aerobic and Anaerobic :  No results found for: LABAERO  No results found for: LABANAE    Urinalysis:      Lab Results   Component Value Date    NITRU NEGATIVE 11/03/2020    WBCUA 25-50 11/03/2020    BACTERIA FEW 11/03/2020    RBCUA 3-5 11/03/2020    BLOODU NEGATIVE 11/03/2020    SPECGRAV >=1.030 10/06/2020    GLUCOSEU NEGATIVE 11/03/2020       Radiology:  XR CHEST (2 VW)   Final Result   Low lung volumes with probable subsegmental atelectasis at the right midlung. **This report has been created using voice recognition software. It may contain minor errors which are inherent in voice recognition technology. **      Final report electronically signed by Dr. Yon Morales MD on 11/13/2020 11:30 AM      CT ABDOMEN PELVIS W IV CONTRAST Additional Contrast? None   Final Result      1. Extensive thickening is seen in the region of the rectum. This is likely on the basis of colitis.    2. Persistence of extensive calcifications throughout the abdomen and pelvis. This is most notable in the right gluteal region. This finding is similar to the previous exam. Haziness is seen throughout the superficial subcutaneous soft tissues of the ventral abdominal wall. This may be on the basis of previous injections. The osseous structures are intact. There are degenerative changes in the spine. No acute fractures. 1. Extensive thickening is seen in the region of the rectum. This is likely on the basis of colitis. 2. Persistence of extensive calcifications in the fascial planes throughout the abdomen and pelvis. This could be on the basis of dermatomyositis. 3. Hepatic steatosis. 4. There is a 3 mm pulmonary nodule near the right lung base. Follow-up is recommended in 6-12 months to assess for stability. **This report has been created using voice recognition software. It may contain minor errors which are inherent in voice recognition technology. ** Final report electronically signed by Dr Barry Gamino on 11/7/2020 8:17 PM      **This report has been created using voice recognition software. It may contain minor errors which are inherent in voice recognition technology. **  Electronically signed by SANDRA Alva CNP on 11/16/2020 at 11:36 AM

## 2020-11-16 NOTE — PROGRESS NOTES
Daily    therapeutic multivitamin-minerals  1 tablet Oral Daily    pantoprazole  40 mg Oral Daily    senna  1 tablet Oral BID    [Held by provider] tamsulosin  0.4 mg Oral Nightly    insulin lispro  0-18 Units Subcutaneous TID WC    insulin lispro  0-9 Units Subcutaneous Nightly    polyethylene glycol  17 g Oral Every Other Day    cefTRIAXone (ROCEPHIN) IV  1 g Intravenous Q24H    metroNIDAZOLE  500 mg Intravenous Q8H      heparin (PORCINE) Infusion 16 Units/kg/hr (11/16/20 1624)    dextrose       traMADol, heparin (porcine), heparin (porcine), sodium chloride flush, acetaminophen **OR** acetaminophen, polyethylene glycol, promethazine **OR** ondansetron, magnesium sulfate, potassium chloride **OR** potassium alternative oral replacement **OR** potassium chloride, albuterol sulfate HFA, benzocaine, guaiFENesin, ondansetron, glucose, dextrose, glucagon (rDNA), dextrose      LABS:     CBC:   Recent Labs     11/14/20  0611 11/15/20  0654 11/16/20  0842   WBC 7.9 7.7 6.9   HGB 10.3* 10.7* 10.8*    337 352     BMP:    Recent Labs     11/14/20  0611 11/15/20  0654 11/16/20  0842   * 140 140   K 3.2* 2.9* 2.8*    101 101   CO2 30 28 27   BUN 4* 4* 4*   CREATININE 1.0 0.8 0.8   GLUCOSE 123* 109* 119*     Calcium:  Recent Labs     11/16/20  0842   CALCIUM 9.2     Ionized Calcium:No results for input(s): IONCA in the last 72 hours. Magnesium:  Recent Labs     11/16/20  0842   MG 1.5*     Phosphorus:No results for input(s): PHOS in the last 72 hours. BNP:No results for input(s): BNP in the last 72 hours. Glucose:  Recent Labs     11/16/20  0746 11/16/20  1136 11/16/20  1635   POCGLU 115* 111* 125*        CULTURES:   UA: No results for input(s): SPECGRAV, PHUR, COLORU, CLARITYU, MUCUS, PROTEINU, BLOODU, RBCUA, WBCUA, BACTERIA, NITRU, GLUCOSEU, BILIRUBINUR, UROBILINOGEN, KETUA, LABCAST, LABCASTTY, AMORPHOS in the last 72 hours.     Invalid input(s): CRYSTALS  Micro:   Lab Results   Component Value Date    BC No growth-preliminary No growth  10/28/2020          Problem list of patient:     Patient Active Problem List   Diagnosis Code    History of atrial fibrillation Z86.79    BPH (benign prostatic hyperplasia) N40.0    Carpal tunnel syndrome on right G56.01    Chronic gout M1A. 9XX0    DM2 (diabetes mellitus, type 2) ([facility] [address] E11.9    Heart failure with preserved ejection fraction (HCC) I50.30    History of alcohol abuse F10.11    History of osteomyelitis Z87.39    Hypogonadism, male E29.1    Major depression F32.9    Morbid obesity ([facility]) E66.01    DURAN (nonalcoholic steatohepatitis) K75.81    KIARA treated with BiPAP G47.33    Vitamin D deficiency E55.9    Normocytic anemia D64.9    Physical deconditioning R53.81    Mood disorder ([facility]) F39    Hallucinations R44.3    GERD (gastroesophageal reflux disease) K21.9    Wound of buttock S31.809A    Primary osteoarthritis of left hip M16.12    Acute on chronic anemia D64.9    Dysphagia R13.10    Hypertension, essential I10    Dyslipidemia E78.5    Aortic stenosis, mild to moderate I35.0    Perirectal abscess K61.1         ASSESSMENT/PLAN   Rectal bleed due to ulceration: better   Acute DVT left lower leg  Perianal wound: continue zinc oxide ointment  Stop antibiotics   Watch for bleeding due to the anticoagulation  Mik Lovett MD, FACP 11/16/2020 5:03 PM

## 2020-11-16 NOTE — PROGRESS NOTES
Gastroenterology  Progress Note    11/16/2020 3:05 PM  Subjective:   Admit Date: 11/7/2020    Interval History: GI bleed with bright red blood as well as melena history of rectal ulcer due to rectal tube placed during COVID-19 recovery time. Melanotic stools or peptic ulcer disease. Patient breathing is fine now on room air. Morbidly obese and history of chronic alcohol abuse in the past the present after discharge to nursing home with GI bleed.   Colonoscopy finding discussed with patient    Diet: DIET CARB CONTROL;    Medications:   Scheduled Meds:    heparin (porcine)  80 Units/kg Intravenous Once    sodium chloride flush  10 mL Intravenous 2 times per day    allopurinol  500 mg Oral Daily    ARIPiprazole  15 mg Oral Daily    atorvastatin  40 mg Oral Nightly    busPIRone  10 mg Oral BID    citalopram  30 mg Oral Daily    ferrous sulfate  325 mg Oral BID WC    folic acid  1 mg Oral Daily    furosemide  40 mg Oral Daily    gabapentin  400 mg Oral BID    glycopyrrolate-formoterol  2 puff Inhalation BID    hydrALAZINE  50 mg Oral BID    [Held by provider] insulin glargine  10 Units Subcutaneous Nightly    modafinil  100 mg Oral Daily    therapeutic multivitamin-minerals  1 tablet Oral Daily    pantoprazole  40 mg Oral Daily    senna  1 tablet Oral BID    [Held by provider] tamsulosin  0.4 mg Oral Nightly    insulin lispro  0-18 Units Subcutaneous TID WC    insulin lispro  0-9 Units Subcutaneous Nightly    polyethylene glycol  17 g Oral Every Other Day    cefTRIAXone (ROCEPHIN) IV  1 g Intravenous Q24H    metroNIDAZOLE  500 mg Intravenous Q8H     Continuous Infusions:    heparin (PORCINE) Infusion      dextrose         CBC:   Recent Labs     11/14/20  0611 11/15/20  0654 11/16/20  0842   WBC 7.9 7.7 6.9   HGB 10.3* 10.7* 10.8*    337 352     BMP:    Recent Labs     11/14/20  0611 11/15/20  0654 11/16/20  0842   * 140 140   K 3.2* 2.9* 2.8*    101 101   CO2 30 28 27 exam.    Simonton Feast is seen throughout the superficial subcutaneous soft tissues of the ventral abdominal wall. This may be on the basis of previous injections. The osseous structures are intact. There are degenerative changes in the spine. No acute fractures. Impression 1. Extensive thickening is seen in the region of the rectum. This is likely on the basis of colitis. 2. Persistence of extensive calcifications in the fascial planes throughout the abdomen and pelvis. This could be on the basis of dermatomyositis. 3. Hepatic steatosis. 4. There is a 3 mm pulmonary nodule near the right lung base. Follow-up is recommended in 6-12 months to assess for stability. **This report has been created using voice recognition software. It may contain minor errors which are inherent in voice recognition technology. **    Final report electronically signed by Dr Mariel Alcantar on 2020 8:17 PM     No results found for this or any previous visit. No results found for this or any previous visit. [facility] [place] [phone]                                 OPERATIVE REPORT     PATIENT NAME: Irina Lomeli                :        1970  MED REC NO:   [id number]                           ROOM:  ACCOUNT NO:   [de-identified]                           ADMIT DATE: 2020  PROVIDER:     Rohan Chavez M.D.     DATE OF PROCEDURE:  11/15/2020     INDICATIONS:  The patient with lower GI bleed. History of COVID-19  pneumonia. Recovered at this time. He had perianal ulcers after  placement of rectal tube. He had an upper endoscopy yesterday done to  evaluate. No gross abnormality was really seen. He has history of  melena as well as bright blood per rectum. Rectal discomfort on  defecation. He had sigmoidoscopy done in the past by Dr. Rowena Wise that  showed perianal ulcers.   Plan today for colonoscopy to evaluate.     SURGEON: Ahsan Ames MD     ASA CLASSIFICATION:  III.     ESTIMATED BLOOD LOSS:  None.     DESCRIPTION OF PROCEDURE:  The patient was brought to the GI lab. Consent was obtained. Risks involved with the procedure were explained  to the patient. Informed consent was obtained. The patient was  monitored during the procedure with pulse oximetry, blood pressure  monitoring, and oxygen by nasal cannula. Sedation by incremental doses  of IV Versed, total 3 mg of Versed and 75 mcg of fentanyl given in  incremental dosage during the procedure to achieve continuous conscious  sedation.     PROCEDURE PERFORMED:  Colonoscopy with biopsy.     Digital examination revealed significant healing perianal ulcers. No  pus coming out. This seemed to have clinically improved significantly  compared to previous description by other physician in the past as well  as by the tech who assisted me in doing the scope manipulation and he  assisted also the same physician, Dr. Rao Short in the past in his  sigmoidoscopy. The site of the perianal ulcer lubricated with viscous  lidocaine as well as KY gel. Then colonoscope 190 was advanced under  direct vision from the rectum up to the cecum. Prep was good, and the  patient tolerated the procedure well. Cecum intubation was confirmed by  appendiceal orifice. Scope was withdrawn. Focal colitis seen healing,  ascending biopsy performed to evaluate, not very significant, likely  ulcer, seen very mild nonclinically significant diverticulosis. Ultimately, the scope retroflexed. Examination of the rectum revealed  no evidence of ulceration or erosion. On withdrawing the scope, we  confirmed the presence of the perianal ulcer which appeared to healing. No pus seen. No ulcers at this time. Scope was withdrawn with no  immediate complications.     IMPRESSION:  1. Healing perianal ulcerations, had improved significantly. 2.  Focal colitis in the ascending, biopsy obtained.   3.  No extension of the ulcer in the rectum was seen.     PLAN:  1. Resume carb-controlled diet. 2.  Monitor the patient closely. 3.  If the patient has discomfort, fiber supplement and Metamucil to  make the bowel movement soft and lubricated, easier to defecate. 4.  Follow up the biopsy results with the GI clinic after discharge.           Felton Mora M.D.  [facility] Webster. [address] [place] [address]                                 OPERATIVE REPORT     PATIENT NAME: Chanel Miller                :        1970  MED REC NO:   [id number]                           ROOM:       0022  ACCOUNT NO:   [de-identified]                           ADMIT DATE: 2020  PROVIDER:     Maci Metcalf M.D.     DATE OF PROCEDURE:  2020     AUTO TORRES:  [id number]. VERIFY CC INFO.     INDICATION:  The patient is with history of melenic stool as well as  bright red blood per rectum. Plan today for EGD to evaluate.     ASA CLASSIFICATION:  III.     SURGEON:  Maci Metcalf MD.     Estimated blood loss in none.     DESCRIPTION OF THE PROCEDURE:  The patient was brought to the GI lab. Consent was obtained. The risks involved with the procedure were  explained to the patient. Informed consent was obtained. The patient  was monitored during the procedure with pulse oximetry, blood pressure  monitoring, and oxygen by nasal cannula. Sedation by incremental doses  of IV Versed, total 3 mg of Versed and 75 mcg of fentanyl given in  incremental dosage during the procedure to achieve continuous conscious  sedation.     PROCEDURE PERFORMED:  EGD:  A standard video 190 Olympus upper scope was  advanced under direct vision from the oral cavity up to third part of  the duodenum. Esophagus showed feature of mild acid reflux in the  distal esophagus. No erosion or ulceration seen. Scope advanced to the  stomach. Retroflex examination of the cardia revealed normal cardia.    Mild gastritis seen in the distal part of the antrum, not very  significant. Duodenum appears normal. No GI bleeding seen. No erosion  or ulceration seen. The scope was withdrawn with no immediate  complications.     IMPRESSION:  1. Mild acid reflux. 2.  Mild gastritis.     PLAN:  1. Continue PPI. 2.  Colonoscopy will be scheduled tomorrow to complete GI evaluation.           Marlon French M.D. Objective:   Vitals: /74   Pulse 72   Temp 97.3 °F (36.3 °C) (Oral)   Resp 16   Ht 5' 7\" (1.702 m)   Wt 279 lb (126.6 kg)   SpO2 96%   BMI 43.70 kg/m²     Intake/Output Summary (Last 24 hours) at 11/16/2020 1505  Last data filed at 11/16/2020 1340  Gross per 24 hour   Intake 2472.57 ml   Output 1850 ml   Net 622.57 ml     General appearance: alert and cooperative with exam  Lungs: clear to auscultation bilaterally  Heart: regular rate and rhythm, S1, S2 normal, no murmur, click, rub or gallop  Abdomen: soft, non-tender; bowel sounds normal; no masses,  no organomegaly morbid obese  Extremities: extremities normal, atraumatic, no cyanosis or edema    Assessment and Plan:   1. COVID-19 this year of the vent now on room air stable. 2. Lower GI bleed rectal tube sigmoidoscopy showed rectal ulcer plan to repeat colonoscopy to evaluate  3. Melena rule out peptic ulcer disease erosive gastritis  4. Morbid obese  5. His alcohol use in the past not drinking since her hospitalizations  6. EGD and colonoscopy finding discussed with patient.        Follow up in GI Clinic after discharge in 4 week(s)    Patient Active Problem List:     History of atrial fibrillation     BPH (benign prostatic hyperplasia)     Carpal tunnel syndrome on right     Chronic gout     DM2 (diabetes mellitus, type 2) (HCC)     Heart failure with preserved ejection fraction (HCC)     History of alcohol abuse     History of osteomyelitis     Hypogonadism, male     Major depression     Morbid obesity ([address]     DURAN (nonalcoholic steatohepatitis)     KIARA treated with BiPAP     Vitamin D deficiency     Normocytic anemia     Physical deconditioning     Mood disorder (HCC)     Hallucinations     GERD (gastroesophageal reflux disease)     Wound of buttock     Primary osteoarthritis of left hip     Acute on chronic anemia     Dysphagia     Hypertension, essential     Dyslipidemia     Aortic stenosis, mild to moderate     Perirectal abscess      Electronically signed by Umair Perez MD on 11/16/2020 at 3:05 PM

## 2020-11-16 NOTE — PROGRESS NOTES
Candelaria Jones [address] [facility] 5K  Occupational Therapy  Daily Note  Time:   Time In: 3072  Time Out: 1500  Timed Code Treatment Minutes: 14 Minutes  Minutes: 14          Date: 2020  Patient Name: Kenneth Bloom,   Gender: male      Room: [facility]  MRN: [id number]  : 1970  (44 y.o.)  Referring Practitioner: BHUPINDER Tuttle  Diagnosis: Perirectal Abscess  Additional Pertinent Hx: Pt with multiple co-morbidities, with a prolonged hospitalization with covid pna, and multiple issues, discharged on  to [facility] who presented to [facility] with rectal bleeding. Patient had similar issues while admitted but at the [facility], patient was having rectal bleed, drainage and pain in the rectum. Per ER, pt has swelling in the area, discharge and redness. Case was discussed with general surgery who recommend admission and consult to surgery. Patients labs were not concerning and actually better than when he was discharged. His hgb is improved as well. Pt was given rocephin and flagyl in the ER    Restrictions/Precautions:  Restrictions/Precautions: Fall Risk     SUBJECTIVE: pt lying in bed and PT in with pt but stated he has 2 DVT in LLE and RN verified this as well. Pt to do only UE bed exercises and RN okayed this     PAIN:  Pt did state pain in LUE from a old pic line area. COGNITION: Decreased Insight and Decreased Safety Awareness    ADL:   Grooming: with set-up. pt able to wash face lying in bed . BALANCE:  Did not complete     BED MOBILITY:  Did not compelte     TRANSFERS:  Did not complete     FUNCTIONAL MOBILITY:  Did not complete     ADDITIONAL ACTIVITIES:  .Patient identified a personal goal to increase UB strength and improve overall endurance so they can complete their toilet & shower transfers; skilled edu on UE strengthening and patient completed BUE strengthening exercises x8  reps x1 set this date with a min resistive band  in all joints and all planes. Patient tolerated in supine , requiring short  rest breaks. Pt required min verbal   cues for technique. ASSESSMENT:     Activity Tolerance:  Patient tolerance of  treatment: fair. Discharge Recommendations: Continue to assess pending progress, ECF with OT   Equipment Recommendations: Equipment Needed: No  Other: Will continue to monitor  Plan: Times per week: 3-5x  Specific instructions for Next Treatment: Functional transfers as able; ADLs while sitting upright; arm and leg exercises  Current Treatment Recommendations: Strengthening, Functional Mobility Training, Endurance Training, Self-Care / ADL  Plan Comment: Pt would benefit from continued skilled OT services when medically stable and discharged from Acute. OT recommended while at [facility]. Patient Education  Patient Education: Home Exercise Program and Importance of Increasing Activity    Goals  Short term goals  Time Frame for Short term goals: By discharge  Short term goal 1: Pt will complete BUE ROM/moderate resistance exercises x 5 sets of 10 reps each to increase his strength and endurance for ease of doing self care and functional transfers. Short term goal 2: Pt marilin complete sit-stand t/s onto LENORA STEDY with min A x 2 & min vcs for technique for eventual BSC t/fs  Short term goal 3: Pt will tolerate 8-10 min EOB ADL task with S & 0-2 vcs for safety    Following session, patient left in safe position with all fall risk precautions in place.

## 2020-11-16 NOTE — PROGRESS NOTES
Emanuel Minaya 58  PHYSICAL THERAPY MISSED TREATMENT NOTE  [facility] ONC MED 5K    Date: 2020  Patient Name: Brenda Skaggs        MRN: [id number]   : 1970  (44 y.o.)  Gender: male   Referring Practitioner: Bonny Damon CNP  Diagnosis: Perirectal abscess         REASON FOR MISSED TREATMENT:  Hold treatment per nursing request.  Pt with 2 acute L LE DVT's, will hold at this time.

## 2020-11-16 NOTE — PROGRESS NOTES
Pharmacy Heparin Consult    Theresa Cowan is a 44 y.o. male. Pharmacy has been consulted to adjust heparin. Height:   Ht Readings from Last 1 Encounters:   11/07/20 5' 7\" (1.702 m)     Weight:  Wt Readings from Last 1 Encounters:   11/07/20 279 lb (126.6 kg)       Heparin Indication: DVT  Bolus Permitted: yes  Goal aPTT: 60-95    Plan:  Bolus: 80 units/kg  Rate: 16 units/kg/hr  Next aPTT: [address] [facility] D., BCPS, BCCCP 11/16/2020 4:02 PM

## 2020-11-16 NOTE — CARE COORDINATION
11/16/20, 4:57 PM EST    DISCHARGE PLANNING EVALUATION    Spoke with Pat with [facility] earlier today re: pre cert. She will check to see if it has been started. Patient will need a COVID test prior to return.

## 2020-11-16 NOTE — PLAN OF CARE
Continue therapy as ordered to achieve and maintain clear breath sounds and support ventilation during sleep.

## 2020-11-17 LAB
ANION GAP SERPL CALCULATED.3IONS-SCNC: 11 MEQ/L (ref 8–16)
APTT: 132.9 SECONDS (ref 22–38)
APTT: 52.9 SECONDS (ref 22–38)
APTT: 71.5 SECONDS (ref 22–38)
APTT: 91.1 SECONDS (ref 22–38)
APTT: > 200 SECONDS (ref 22–38)
BUN BLDV-MCNC: 4 MG/DL (ref 7–22)
CALCIUM SERPL-MCNC: 9 MG/DL (ref 8.5–10.5)
CHLORIDE BLD-SCNC: 99 MEQ/L (ref 98–111)
CO2: 31 MEQ/L (ref 23–33)
CREAT SERPL-MCNC: 0.7 MG/DL (ref 0.4–1.2)
ERYTHROCYTE [DISTWIDTH] IN BLOOD BY AUTOMATED COUNT: 16 % (ref 11.5–14.5)
ERYTHROCYTE [DISTWIDTH] IN BLOOD BY AUTOMATED COUNT: 55 FL (ref 35–45)
GFR SERPL CREATININE-BSD FRML MDRD: > 90 ML/MIN/1.73M2
GLUCOSE BLD-MCNC: 101 MG/DL (ref 70–108)
GLUCOSE BLD-MCNC: 115 MG/DL (ref 70–108)
GLUCOSE BLD-MCNC: 117 MG/DL (ref 70–108)
GLUCOSE BLD-MCNC: 124 MG/DL (ref 70–108)
GLUCOSE BLD-MCNC: 145 MG/DL (ref 70–108)
HCT VFR BLD CALC: 35.7 % (ref 42–52)
HEMOGLOBIN: 11 GM/DL (ref 14–18)
MAGNESIUM: 1.5 MG/DL (ref 1.6–2.4)
MCH RBC QN AUTO: 28.9 PG (ref 26–33)
MCHC RBC AUTO-ENTMCNC: 30.8 GM/DL (ref 32.2–35.5)
MCV RBC AUTO: 93.7 FL (ref 80–94)
PLATELET # BLD: 358 THOU/MM3 (ref 130–400)
PMV BLD AUTO: 11 FL (ref 9.4–12.4)
POTASSIUM REFLEX MAGNESIUM: 2.7 MEQ/L (ref 3.5–5.2)
POTASSIUM SERPL-SCNC: 3 MEQ/L (ref 3.5–5.2)
RBC # BLD: 3.81 MILL/MM3 (ref 4.7–6.1)
SODIUM BLD-SCNC: 141 MEQ/L (ref 135–145)
WBC # BLD: 6.9 THOU/MM3 (ref 4.8–10.8)

## 2020-11-17 PROCEDURE — 6370000000 HC RX 637 (ALT 250 FOR IP): Performed by: NURSE PRACTITIONER

## 2020-11-17 PROCEDURE — 6360000002 HC RX W HCPCS: Performed by: COUNSELOR

## 2020-11-17 PROCEDURE — 82948 REAGENT STRIP/BLOOD GLUCOSE: CPT

## 2020-11-17 PROCEDURE — 84132 ASSAY OF SERUM POTASSIUM: CPT

## 2020-11-17 PROCEDURE — 6360000002 HC RX W HCPCS: Performed by: FAMILY MEDICINE

## 2020-11-17 PROCEDURE — 85027 COMPLETE CBC AUTOMATED: CPT

## 2020-11-17 PROCEDURE — 99232 SBSQ HOSP IP/OBS MODERATE 35: CPT | Performed by: NURSE PRACTITIONER

## 2020-11-17 PROCEDURE — 36415 COLL VENOUS BLD VENIPUNCTURE: CPT

## 2020-11-17 PROCEDURE — 1200000003 HC TELEMETRY R&B

## 2020-11-17 PROCEDURE — 94760 N-INVAS EAR/PLS OXIMETRY 1: CPT

## 2020-11-17 PROCEDURE — 83735 ASSAY OF MAGNESIUM: CPT

## 2020-11-17 PROCEDURE — 94640 AIRWAY INHALATION TREATMENT: CPT

## 2020-11-17 PROCEDURE — 85730 THROMBOPLASTIN TIME PARTIAL: CPT

## 2020-11-17 PROCEDURE — 80048 BASIC METABOLIC PNL TOTAL CA: CPT

## 2020-11-17 PROCEDURE — 6370000000 HC RX 637 (ALT 250 FOR IP): Performed by: FAMILY MEDICINE

## 2020-11-17 RX ORDER — HEPARIN SODIUM 10000 [USP'U]/100ML
14 INJECTION, SOLUTION INTRAVENOUS CONTINUOUS
Status: DISCONTINUED | OUTPATIENT
Start: 2020-11-17 | End: 2020-11-18

## 2020-11-17 RX ADMIN — HEPARIN SODIUM 12 UNITS/KG/HR: 10000 INJECTION, SOLUTION INTRAVENOUS at 07:32

## 2020-11-17 RX ADMIN — ATORVASTATIN CALCIUM 40 MG: 40 TABLET, FILM COATED ORAL at 21:00

## 2020-11-17 RX ADMIN — POTASSIUM CHLORIDE 10 MEQ: 7.46 INJECTION, SOLUTION INTRAVENOUS at 21:41

## 2020-11-17 RX ADMIN — ARIPIPRAZOLE 15 MG: 15 TABLET ORAL at 09:22

## 2020-11-17 RX ADMIN — FOLIC ACID 1 MG: 1 TABLET ORAL at 09:22

## 2020-11-17 RX ADMIN — POTASSIUM CHLORIDE 10 MEQ: 7.46 INJECTION, SOLUTION INTRAVENOUS at 12:58

## 2020-11-17 RX ADMIN — BUSPIRONE HYDROCHLORIDE 10 MG: 10 TABLET ORAL at 09:22

## 2020-11-17 RX ADMIN — GLYCOPYRROLATE AND FORMOTEROL FUMARATE 2 PUFF: 9; 4.8 AEROSOL, METERED RESPIRATORY (INHALATION) at 18:06

## 2020-11-17 RX ADMIN — GABAPENTIN 400 MG: 400 CAPSULE ORAL at 21:00

## 2020-11-17 RX ADMIN — POTASSIUM CHLORIDE 10 MEQ: 7.46 INJECTION, SOLUTION INTRAVENOUS at 16:40

## 2020-11-17 RX ADMIN — POTASSIUM CHLORIDE 10 MEQ: 7.46 INJECTION, SOLUTION INTRAVENOUS at 06:01

## 2020-11-17 RX ADMIN — POTASSIUM CHLORIDE 10 MEQ: 7.46 INJECTION, SOLUTION INTRAVENOUS at 14:40

## 2020-11-17 RX ADMIN — HYDRALAZINE HYDROCHLORIDE 50 MG: 50 TABLET, FILM COATED ORAL at 21:00

## 2020-11-17 RX ADMIN — HYDRALAZINE HYDROCHLORIDE 50 MG: 50 TABLET, FILM COATED ORAL at 09:22

## 2020-11-17 RX ADMIN — TRAMADOL HYDROCHLORIDE 50 MG: 50 TABLET ORAL at 09:21

## 2020-11-17 RX ADMIN — MAGNESIUM SULFATE HEPTAHYDRATE 2 G: 40 INJECTION, SOLUTION INTRAVENOUS at 19:12

## 2020-11-17 RX ADMIN — POTASSIUM CHLORIDE 10 MEQ: 7.46 INJECTION, SOLUTION INTRAVENOUS at 10:47

## 2020-11-17 RX ADMIN — GABAPENTIN 400 MG: 400 CAPSULE ORAL at 09:22

## 2020-11-17 RX ADMIN — POTASSIUM CHLORIDE 10 MEQ: 7.46 INJECTION, SOLUTION INTRAVENOUS at 08:48

## 2020-11-17 RX ADMIN — FERROUS SULFATE TAB 325 MG (65 MG ELEMENTAL FE) 325 MG: 325 (65 FE) TAB at 09:22

## 2020-11-17 RX ADMIN — FUROSEMIDE 40 MG: 40 TABLET ORAL at 09:22

## 2020-11-17 RX ADMIN — PANTOPRAZOLE SODIUM 40 MG: 40 TABLET, DELAYED RELEASE ORAL at 06:00

## 2020-11-17 RX ADMIN — MULTIPLE VITAMINS W/ MINERALS TAB 1 TABLET: TAB at 09:22

## 2020-11-17 RX ADMIN — FERROUS SULFATE TAB 325 MG (65 MG ELEMENTAL FE) 325 MG: 325 (65 FE) TAB at 16:41

## 2020-11-17 RX ADMIN — BUSPIRONE HYDROCHLORIDE 10 MG: 10 TABLET ORAL at 21:00

## 2020-11-17 RX ADMIN — HEPARIN SODIUM 12 UNITS/KG/HR: 10000 INJECTION, SOLUTION INTRAVENOUS at 01:32

## 2020-11-17 RX ADMIN — CITALOPRAM 30 MG: 20 TABLET, FILM COATED ORAL at 09:22

## 2020-11-17 RX ADMIN — ALLOPURINOL 500 MG: 300 TABLET ORAL at 09:22

## 2020-11-17 RX ADMIN — GLYCOPYRROLATE AND FORMOTEROL FUMARATE 2 PUFF: 9; 4.8 AEROSOL, METERED RESPIRATORY (INHALATION) at 07:40

## 2020-11-17 ASSESSMENT — PAIN SCALES - GENERAL
PAINLEVEL_OUTOF10: 5

## 2020-11-17 ASSESSMENT — PAIN DESCRIPTION - PAIN TYPE
TYPE: CHRONIC PAIN;ACUTE PAIN

## 2020-11-17 ASSESSMENT — PAIN SCALES - WONG BAKER
WONGBAKER_NUMERICALRESPONSE: 0
WONGBAKER_NUMERICALRESPONSE: 0

## 2020-11-17 ASSESSMENT — PAIN DESCRIPTION - ORIENTATION
ORIENTATION: LEFT

## 2020-11-17 ASSESSMENT — PAIN DESCRIPTION - LOCATION
LOCATION: FOOT

## 2020-11-17 NOTE — PROGRESS NOTES
Heparin Consult  Lab Results   Component Value Date    APTT 91.1 11/17/2020     Lab Results   Component Value Date    HGB 11.0 11/17/2020    HCT 35.7 11/17/2020     11/17/2020    INR 1.14 11/07/2020       Current Rate: 12 units per kg per hour    Plan:  Bolus: none  Rate: continue at 12 units/kg/hr (heparin had been restarted at 0130 as ordered)  Next aPTT: 0700    Sara Villafana RPh, BCPS, BCGP  11/17/2020     4:29 AM

## 2020-11-17 NOTE — CARE COORDINATION
DISASTER CHARTING    11/17/20, 9:54 AM EST    DISCHARGE ONGOING EVALUATION:     Zayda Nicolas day: 10  Location: [facility]22/022-A Reason for admit: Perirectal abscess [K61.1]  Perirectal abscess [K61.1]   Barriers to Discharge: GI has signed off, ID following, PT/OT, Heparin drip per pharmacy for left DVT, DM management, incentive spirometry, SCD's, telemetry. PCP: No primary care provider on file. Patient Goals/Plan/Treatment Preferences: Return to [facility].

## 2020-11-17 NOTE — PROGRESS NOTES
healing) as evidenced by wounds    Nutrition Interventions:   Food and/or Nutrient Delivery:  Continue Current Diet, Vitamin Supplement, Continue Oral Nutrition Supplement  Nutrition Education/Counseling:  Education not indicated   Coordination of Nutrition Care:  Continue to monitor while inpatient    Goals:  Pt. will consume 75% or more of meals to promote wound healing during LOS.        Nutrition Monitoring and Evaluation:   Behavioral-Environmental Outcomes:  None Identified   Food/Nutrient Intake Outcomes:  Food and Nutrient Intake, Supplement Intake, Vitamin/Mineral Intake  Physical Signs/Symptoms Outcomes:  Biochemical Data, Fluid Status or Edema, Nutrition Focused Physical Findings, Skin, Weight     Discharge Planning:    Continue current diet, Continue Oral Nutrition Supplement     Electronically signed by Lili Jenkins RD, LD on 11/17/20 at 3:09 PM EST    Contact: [phone]

## 2020-11-17 NOTE — PROGRESS NOTES
Gastroenterology  Progress Note    11/17/2020 5:37 PM  Subjective:   Admit Date: 11/7/2020    Interval History: GI bleed with bright red blood as well as melena history of rectal ulcer due to rectal tube placed during COVID-19 recovery time. Melanotic stools or peptic ulcer disease. Patient breathing is fine now on room air. Morbidly obese and history of chronic alcohol abuse in the past the present after discharge to nursing home with GI bleed. Colonoscopy finding discussed with patient  .   Patient notes he is being treated for deeper thrombosis watch closely for GI bleed with anticoagulations  Diet: DIET CARB CONTROL;    Medications:   Scheduled Meds:    sodium chloride flush  10 mL Intravenous 2 times per day    allopurinol  500 mg Oral Daily    ARIPiprazole  15 mg Oral Daily    atorvastatin  40 mg Oral Nightly    busPIRone  10 mg Oral BID    citalopram  30 mg Oral Daily    ferrous sulfate  325 mg Oral BID WC    folic acid  1 mg Oral Daily    furosemide  40 mg Oral Daily    gabapentin  400 mg Oral BID    glycopyrrolate-formoterol  2 puff Inhalation BID    hydrALAZINE  50 mg Oral BID    [Held by provider] insulin glargine  10 Units Subcutaneous Nightly    modafinil  100 mg Oral Daily    therapeutic multivitamin-minerals  1 tablet Oral Daily    pantoprazole  40 mg Oral Daily    senna  1 tablet Oral BID    [Held by provider] tamsulosin  0.4 mg Oral Nightly    insulin lispro  0-18 Units Subcutaneous TID WC    insulin lispro  0-9 Units Subcutaneous Nightly    polyethylene glycol  17 g Oral Every Other Day     Continuous Infusions:    heparin (PORCINE) Infusion 14 Units/kg/hr (11/17/20 1555)    dextrose         CBC:   Recent Labs     11/16/20  0842 11/16/20  1823 11/17/20  0339   WBC 6.9 8.2 6.9   HGB 10.8* 10.8* 11.0*    361 358     BMP:    Recent Labs     11/15/20  0654 11/16/20  0842 11/17/20  0339    140 141   K 2.9* 2.8* 2.7*    101 99   CO2 28 27 31   BUN 4* 4* 4* CREATININE 0.8 0.8 0.7   GLUCOSE 109* 119* 117*     Hepatic: No results for input(s): AST, ALT, ALB, BILITOT, ALKPHOS in the last 72 hours. INR: No results for input(s): INR in the last 72 hours. Imaging:  No results found for this or any previous visit. Results for orders placed during the hospital encounter of 11/07/20   CT ABDOMEN PELVIS W IV CONTRAST Additional Contrast? None    Narrative PROCEDURE: CT ABDOMEN PELVIS W IV CONTRAST    CLINICAL INFORMATION: 73-year-old male with rectal bleeding. Possible fistula. Abdominal pain . COMPARISON: CT scan 10/20/2020. TECHNIQUE: 5 mm axial CT images were obtained through the abdomen and pelvis after the administration of intravenous and oral contrast. Coronal and sagittal reconstructions were obtained. All CT scans at this facility use dose modulation, iterative reconstruction, and/or weight-based dosing when appropriate to reduce radiation dose to as low as reasonably achievable. FINDINGS:  Atelectasis is noted at the bilateral lung bases. On axial image #1 there is a 3 mm pulmonary nodule in the anterior right lung. There is no pleural effusion. The base of the heart is within acceptable limits. The liver is hypoattenuated. This may be due to fatty infiltration. The gallbladder, pancreas, adrenal glands and spleen are within normal limits. There are some splenule seen in the left upper quadrant. The kidneys are symmetric in size, shape and degree of enhancement. There is no hydronephrosis. There is no evidence of a small bowel obstruction. There is circumferential wall thickening in the region of the rectum. There is a Dimas catheter within the lumen of the urinary bladder. The aorta and IVC are normal in caliber. Extensive calcifications are again seen in the fascial planes throughout the abdomen and pelvis. This is most notable in the right gluteal region.  This finding is similar to the previous exam.    Haziness is seen throughout the superficial subcutaneous soft tissues of the ventral abdominal wall. This may be on the basis of previous injections. The osseous structures are intact. There are degenerative changes in the spine. No acute fractures. Impression 1. Extensive thickening is seen in the region of the rectum. This is likely on the basis of colitis. 2. Persistence of extensive calcifications in the fascial planes throughout the abdomen and pelvis. This could be on the basis of dermatomyositis. 3. Hepatic steatosis. 4. There is a 3 mm pulmonary nodule near the right lung base. Follow-up is recommended in 6-12 months to assess for stability. **This report has been created using voice recognition software. It may contain minor errors which are inherent in voice recognition technology. **    Final report electronically signed by Dr Heath Clayton on 2020 8:17 PM     No results found for this or any previous visit. No results found for this or any previous visit[place] [address]                                 OPERATIVE REPORT     PATIENT NAME: Leodan Soto                :        1970  MED REC NO:   [id number]                           ROOM:  ACCOUNT NO:   [de-identified]                           ADMIT DATE: 2020  PROVIDER:     Moris Weiner M.D.     DATE OF PROCEDURE:  11/15/2020     INDICATIONS:  The patient with lower GI bleed. History of COVID-19  pneumonia. Recovered at this time. He had perianal ulcers after  placement of rectal tube. He had an upper endoscopy yesterday done to  evaluate. No gross abnormality was really seen. He has history of  melena as well as bright blood per rectum. Rectal discomfort on  defecation. He had sigmoidoscopy done in the past by Dr. Gayla Oviedo that  showed perianal ulcers.   Plan today for colonoscopy to evaluate.     SURGEON:  Moris Weiner MD     ASA CLASSIFICATION:  III.     ESTIMATED BLOOD LOSS:  None.     DESCRIPTION OF PROCEDURE:  The patient was brought to the GI lab. Consent was obtained. Risks involved with the procedure were explained  to the patient. Informed consent was obtained. The patient was  monitored during the procedure with pulse oximetry, blood pressure  monitoring, and oxygen by nasal cannula. Sedation by incremental doses  of IV Versed, total 3 mg of Versed and 75 mcg of fentanyl given in  incremental dosage during the procedure to achieve continuous conscious  sedation.     PROCEDURE PERFORMED:  Colonoscopy with biopsy.     Digital examination revealed significant healing perianal ulcers. No  pus coming out. This seemed to have clinically improved significantly  compared to previous description by other physician in the past as well  as by the tech who assisted me in doing the scope manipulation and he  assisted also the same physician, Dr. Devin Don in the past in his  sigmoidoscopy. The site of the perianal ulcer lubricated with viscous  lidocaine as well as KY gel. Then colonoscope 190 was advanced under  direct vision from the rectum up to the cecum. Prep was good, and the  patient tolerated the procedure well. Cecum intubation was confirmed by  appendiceal orifice. Scope was withdrawn. Focal colitis seen healing,  ascending biopsy performed to evaluate, not very significant, likely  ulcer, seen very mild nonclinically significant diverticulosis. Ultimately, the scope retroflexed. Examination of the rectum revealed  no evidence of ulceration or erosion. On withdrawing the scope, we  confirmed the presence of the perianal ulcer which appeared to healing. No pus seen. No ulcers at this time. Scope was withdrawn with no  immediate complications.     IMPRESSION:  1. Healing perianal ulcerations, had improved significantly. 2.  Focal colitis in the ascending, biopsy obtained.   3.  No extension of the ulcer in the rectum was seen.     PLAN:  1. Resume carb-controlled diet. 2.  Monitor the patient closely. 3.  If the patient has discomfort, fiber supplement and Metamucil to  make the bowel movement soft and lubricated, easier to defecate. 4.  Follow up the biopsy results with the GI clinic after discharge.           Arlene Kehr, M.D. Edd Bien. [address] [place] [id number]                                 OPERATIVE REPORT     PATIENT NAME: Faraz Marion                :        1970  MED REC NO:   [id number]                           ROOM:       0022  ACCOUNT NO:   [de-identified]                           ADMIT DATE: 2020  PROVIDER:     Ivone Tracy M.D.     DATE OF PROCEDURE:  2020     AUTO PAN:  [id number]. VERIFY CC INFO.     INDICATION:  The patient is with history of melenic stool as well as  bright red blood per rectum. Plan today for EGD to evaluate.     ASA CLASSIFICATION:  III.     SURGEON:  Ivone Tracy MD.     Estimated blood loss in none.     DESCRIPTION OF THE PROCEDURE:  The patient was brought to the GI lab. Consent was obtained. The risks involved with the procedure were  explained to the patient. Informed consent was obtained. The patient  was monitored during the procedure with pulse oximetry, blood pressure  monitoring, and oxygen by nasal cannula. Sedation by incremental doses  of IV Versed, total 3 mg of Versed and 75 mcg of fentanyl given in  incremental dosage during the procedure to achieve continuous conscious  sedation.     PROCEDURE PERFORMED:  EGD:  A standard video 190 Olympus upper scope was  advanced under direct vision from the oral cavity up to third part of  the duodenum. Esophagus showed feature of mild acid reflux in the  distal esophagus. No erosion or ulceration seen. Scope advanced to the  stomach. Retroflex examination of the cardia revealed normal cardia.    Mild gastritis seen in the distal part of the Morbid obesity (HCC)     DURAN (nonalcoholic steatohepatitis)     KIARA treated with BiPAP     Vitamin D deficiency     Normocytic anemia     Physical deconditioning     Mood disorder (HCC)     Hallucinations     GERD (gastroesophageal reflux disease)     Wound of buttock     Primary osteoarthritis of left hip     Acute on chronic anemia     Dysphagia     Hypertension, essential     Dyslipidemia     Aortic stenosis, mild to moderate     Perirectal abscess      Electronically signed by Prabha Altman MD on 11/17/2020 at 5:37 PM

## 2020-11-17 NOTE — PROGRESS NOTES
Progress note: Infectious diseases    Patient - Matilde Severe,  Age - 44 y.o.    - 1970      Room Number - 5K-22/022-A   MRN -  [id number]   Acct # - [de-identified]  Date of Admission -  2020  4:57 PM    SUBJECTIVE:   He has pain on the left calf. no shortness of breath  OBJECTIVE   VITALS    height is 5' 7\" (1.702 m) and weight is 279 lb (126.6 kg). His oral temperature is 97.5 °F (36.4 °C). His blood pressure is 130/77 and his pulse is 77. His respiration is 18 and oxygen saturation is 95%. Wt Readings from Last 3 Encounters:   20 279 lb (126.6 kg)   20 276 lb 1.6 oz (125.2 kg)   09/15/20 281 lb 6.4 oz (127.6 kg)       I/O (24 Hours)    Intake/Output Summary (Last 24 hours) at 2020 1309  Last data filed at 2020 0926  Gross per 24 hour   Intake 2815.8 ml   Output 1400 ml   Net 1415.8 ml       General Appearance  Awake, alert, oriented,  Obese. HEENT - normocephalic, atraumatic, pale conjunctiva,  anicteric sclera  Neck - Supple, no mass  Lungs -  Bilateral  air entry, diminished breath sound  Cardiovascular - Heart sounds are normal.     Abdomen - soft, not distended, nontender, perianal open wound  Neurologic - awake and oriented.   Skin - No bruising or bleeding  Extremities - no calf induration or tenderness    MEDICATIONS:      sodium chloride flush  10 mL Intravenous 2 times per day    allopurinol  500 mg Oral Daily    ARIPiprazole  15 mg Oral Daily    atorvastatin  40 mg Oral Nightly    busPIRone  10 mg Oral BID    citalopram  30 mg Oral Daily    ferrous sulfate  325 mg Oral BID WC    folic acid  1 mg Oral Daily    furosemide  40 mg Oral Daily    gabapentin  400 mg Oral BID    glycopyrrolate-formoterol  2 puff Inhalation BID    hydrALAZINE  50 mg Oral BID    [Held by provider] insulin glargine  10 Units Subcutaneous Nightly    modafinil  100 mg Oral Daily    therapeutic multivitamin-minerals  1 tablet Oral Daily    pantoprazole  40 mg Oral Daily    senna  1 tablet Oral BID    [Held by provider] tamsulosin  0.4 mg Oral Nightly    insulin lispro  0-18 Units Subcutaneous TID WC    insulin lispro  0-9 Units Subcutaneous Nightly    polyethylene glycol  17 g Oral Every Other Day      heparin (PORCINE) Infusion 12 Units/kg/hr (11/17/20 0732)    dextrose       traMADol, sodium chloride flush, acetaminophen **OR** acetaminophen, polyethylene glycol, promethazine **OR** ondansetron, magnesium sulfate, potassium chloride **OR** potassium alternative oral replacement **OR** potassium chloride, albuterol sulfate HFA, benzocaine, guaiFENesin, ondansetron, glucose, dextrose, glucagon (rDNA), dextrose      LABS:     CBC:   Recent Labs     11/16/20  0842 11/16/20  1823 11/17/20  0339   WBC 6.9 8.2 6.9   HGB 10.8* 10.8* 11.0*    361 358     BMP:    Recent Labs     11/15/20  0654 11/16/20  0842 11/17/20  0339    140 141   K 2.9* 2.8* 2.7*    101 99   CO2 28 27 31   BUN 4* 4* 4*   CREATININE 0.8 0.8 0.7   GLUCOSE 109* 119* 117*     Calcium:  Recent Labs     11/17/20  0339   CALCIUM 9.0     Ionized Calcium:No results for input(s): IONCA in the last 72 hours. Magnesium:  Recent Labs     11/17/20  0339   MG 1.5*     Phosphorus:No results for input(s): PHOS in the last 72 hours. BNP:No results for input(s): BNP in the last 72 hours. Glucose:  Recent Labs     11/16/20 2058 11/17/20  0801 11/17/20  1140   POCGLU 121* 101 145*         Problem list of patient:     Patient Active Problem List   Diagnosis Code    History of atrial fibrillation Z86.79    BPH (benign prostatic hyperplasia) N40.0    Carpal tunnel syndrome on right G56.01    Chronic gout M1A. 9XX0    DM2 (diabetes mellitus, type 2) ([facility] [address] E11.9    Heart failure with preserved ejection fraction (HCC) I50.30    History of alcohol abuse F10.11    History of osteomyelitis Z87.39    Hypogonadism, male

## 2020-11-17 NOTE — PROGRESS NOTES
Heparin Consult  Lab Results   Component Value Date    APTT 52.9 11/17/2020     Lab Results   Component Value Date    HGB 11.0 11/17/2020    HCT 35.7 11/17/2020     11/17/2020    INR 1.14 11/07/2020       Current Rate: 12 units/kg/hr    Plan:  Please increase heparin rate to 14 units/kg/hr  Next aPTT: 2000 (Q6H)    Jonathan Ulloa, PharmD   11/17/2020, 3:37 PM

## 2020-11-17 NOTE — PROGRESS NOTES
Heparin Consult  Lab Results   Component Value Date    APTT 71.5 11/17/2020     Lab Results   Component Value Date    HGB 11.0 11/17/2020    HCT 35.7 11/17/2020     11/17/2020    INR 1.14 11/07/2020       Current Rate: 12 units/kg/hr    Plan:  Continue rate: 12 units/kg/hr  Next aPTT: 1400 (Q6H)       Blaire Oliveira, PharmD   11/17/2020, 9:33 AM

## 2020-11-17 NOTE — CARE COORDINATION
11/17/20, 9:57 AM EST    DISCHARGE PLANNING EVALUATION    9:47 am-spoke with Pat with [facility] admissions-they have received authorization for patient. He will need a COVID test. SW will check status of patient and get back with her. Discussed with MORELIA Ruiz-patient now has 2 DVT's in the left leg and is now on a heprin drip. Call to Logan Bryson and updated her on the above. She will check to see how long pre cert is good for and get back with SW.     10:56 AM Received call from Logan Bryson with [facility] -pre cert is good through 11/19/2020-patient will keep her updated on patient status.

## 2020-11-17 NOTE — PROGRESS NOTES
0246: Called phlebotomy to come draw aPTT. Stated they would be right up.   0317: Called phlebotomy to come draw aPTT, no answer. 8889: Called phlebotomy to come draw aPTT, no answer. 1244: Called Lab to help with getting aPTT drawn. Transferred to a phlebotomist who answered and said she would be right up.

## 2020-11-17 NOTE — PROGRESS NOTES
Hospitalist Progress Note      Patient:  Haydee Smith    Unit/Bed:5K-22/022-A  YOB: 1970  MRN: [id number]   Acct: [de-identified]   PCP: No primary care provider on file. Date of Admission: 11/7/2020    Assessment/Plan:    1. Perirectal ulceration with bleeding. Flexiseal likely contributed. - surgery seen, discussed with ID. No surgical plans. ID signed off.   - CT abd shows colitis  - holding asa and Lovenox  -Continue zinc oxide ointment and cleaning of the perianal area after bowel movements.  -Wound/ostomy consulted. -GI seen. -EGD 11/14 mild acid reflux and gastritis. -Colonoscopy today with healing perianal ulceration, focal colitis in the ascending colon. -ATB stopped 11/16.      2. Acute LLE DVT. - Was holding anticoagulation due to bleeding from perirectal ulceration.    -Heparin gtt, [place] [facility] [place] ok with [place].       3. Chronic Resp failure/KIARA  - use bipap at night, and during naps. - avoid sedative meds  - continue modafinil  - continuous pulse ox       4. Urinary retention   - mason in place. -OP follow up with Urology.      5. S/p sepsis/covid 19 pna/ prolonged hosp stay    6. S/p Dysphagia    7. IDDM. BS trend remains acceptable. - Holding home Lantus  - Hypoglycemia protocol, ac&hs checks    8. Morbid obesity: BMI 43.7    9. Hypokalemia, replacing. .     10. Hypomagnesemia, replace     11. Hypernatremia, resolved. Monitor. 12. Macrocytic anemia. Hbg stable at 10.2. 13. Deconditioning. PT/OT    14. Orthostatic hypotension, resolved. S/p IVF bolus 11/13.     Dispo: [place] [facility] [place] if ok with GI. Back to [facility] tomorrow if tolerates?     Chief Complaint: Rectal bleed    Initial H and P:-    **From Chart Review:  \"51 y.o. male with multiple co-morbidities, with a prolonged hospitalization with covid pna, and multiple issues, discharged on 11/7 to [facility] who presented to [facility] with rectal bleeding. Patient had similar issues while admitted but at the [facility], patient was having rectal bleed, drainage and pain in the rectum. Per ER, pt has swelling in the area, discharge and redness. Case was discussed with general surgery who recommend admission and consult to surgery. Patients labs were not concerning and actually better than when he was discharged. His hgb is improved as well. Pt was given rocephin and flagyl in the ER, and admitted to the floor with consults to Surgery. \"    Subjective (past 24 hours): Reports pain in LLE is a 5/10. Ultram started yesterday helping. Remains drowsy intermittently. Appetite ok.  No N/V/D    Medications:  Reviewed    Infusion Medications    heparin (PORCINE) Infusion 12 Units/kg/hr (11/17/20 0732)    dextrose       Scheduled Medications    sodium chloride flush  10 mL Intravenous 2 times per day    allopurinol  500 mg Oral Daily    ARIPiprazole  15 mg Oral Daily    atorvastatin  40 mg Oral Nightly    busPIRone  10 mg Oral BID    citalopram  30 mg Oral Daily    ferrous sulfate  325 mg Oral BID WC    folic acid  1 mg Oral Daily    furosemide  40 mg Oral Daily    gabapentin  400 mg Oral BID    glycopyrrolate-formoterol  2 puff Inhalation BID    hydrALAZINE  50 mg Oral BID    [Held by provider] insulin glargine  10 Units Subcutaneous Nightly    modafinil  100 mg Oral Daily    therapeutic multivitamin-minerals  1 tablet Oral Daily    pantoprazole  40 mg Oral Daily    senna  1 tablet Oral BID    [Held by provider] tamsulosin  0.4 mg Oral Nightly    insulin lispro  0-18 Units Subcutaneous TID WC    insulin lispro  0-9 Units Subcutaneous Nightly    polyethylene glycol  17 g Oral Every Other Day     PRN Meds: traMADol, sodium chloride flush, acetaminophen **OR** acetaminophen, polyethylene glycol, promethazine **OR** ondansetron, magnesium sulfate, potassium chloride **OR** potassium alternative oral replacement **OR** potassium chloride, albuterol sulfate HFA, benzocaine, guaiFENesin, ondansetron, glucose, dextrose, glucagon (rDNA), dextrose      Intake/Output Summary (Last 24 hours) at 11/17/2020 1201  Last data filed at 11/17/2020 0926  Gross per 24 hour   Intake 2815.8 ml   Output 1400 ml   Net 1415.8 ml       Diet:  DIET CARB CONTROL; Exam:  /77   Pulse 77   Temp 97.5 °F (36.4 °C) (Oral)   Resp 18   Ht 5' 7\" (1.702 m)   Wt 279 lb (126.6 kg)   SpO2 95%   BMI 43.70 kg/m²     General appearance: Alert and appropriate, pleasant morbidly obese adult male. No apparent distress, appears stated age and cooperative. HEENT: Pupils equal, round, and reactive to light. Conjunctivae/corneas clear. Neck: Supple, with full range of motion. No jugular venous distention. Trachea midline. Respiratory:  Normal respiratory effort. Clear to auscultation, bilaterally without Rales/Wheezes/Rhonchi. Cardiovascular: Regular rate and rhythm with normal S1/S2 without murmurs, rubs or gallops. Abdomen: Soft, non-tender, non-distended with normal bowel sounds. Multiple episodes of liquid stool noted. Musculoskeletal: Passive and active ROM x 4 extremities. Skin: Skin color, texture, turgor normal.  No rashes or lesions. Neurologic:  Neurovascularly intact without any focal sensory/motor deficits. Psychiatric: Alert and oriented, engaged in conversation appropriately. Thought content reasonable, decent insight  Capillary Refill: Brisk,< 3 seconds   Peripheral Pulses: +2 palpable, equal bilaterally     Labs:   Recent Labs     11/16/20  0842 11/16/20  1823 11/17/20  0339   WBC 6.9 8.2 6.9   HGB 10.8* 10.8* 11.0*   HCT 35.4* 35.1* 35.7*    361 358     Recent Labs     11/15/20  0654 11/16/20  0842 11/17/20  0339    140 141   K 2.9* 2.8* 2.7*    101 99   CO2 28 27 31   BUN 4* 4* 4*   CREATININE 0.8 0.8 0.7   CALCIUM 9.4 9.2 9.0     No results for input(s): AST, ALT, BILIDIR, BILITOT, ALKPHOS in the last 72 hours.   No results for input(s): INR in the last 72 hours. No results for input(s): Tisha Goode in the last 72 hours. Microbiology:    Blood culture #1:   Lab Results   Component Value Date    BC No growth-preliminary No growth  10/28/2020       Blood culture #2:No results found for: Wesley Dunk    Organism:  Lab Results   Component Value Date    ORG Staphylococcus (coagulase negative) 10/22/2020         Lab Results   Component Value Date    LABGRAM  10/12/2020     Moderate segmented neutrophils observed. Rare epithelial cells observed. Rare gram positive cocci occurring singly and in pairs. MRSA culture only:No results found for: [facility]    Urine culture:   Lab Results   Component Value Date    Zeny Palm  10/06/2020     At least one of drugs in current antibiotic therapy is ineffective in vitro for isolate. LABURIN [place] count: >100,000 CFU/mL   10/06/2020       Respiratory culture: No results found for: CULTRESP    Aerobic and Anaerobic :  No results found for: LABAERO  No results found for: LABANAE    Urinalysis:      Lab Results   Component Value Date    NITRU NEGATIVE 11/03/2020    WBCUA 25-50 11/03/2020    BACTERIA FEW 11/03/2020    RBCUA 3-5 11/03/2020    BLOODU NEGATIVE 11/03/2020    SPECGRAV >=1.030 10/06/2020    GLUCOSEU NEGATIVE 11/03/2020       Radiology:  VL DUP LOWER EXTREMITY VENOUS LEFT   Final Result   Findings indicate the presence of acute DVT in one of the left peroneal veins and one left posterior tibial veins. All other deep veins are clear. Perfect serve Voice message left with Luana Eaton               **This report has been created using voice recognition software. It may contain minor errors which are inherent in voice recognition technology. **      Final report electronically signed by Dr. El Magana on 11/16/2020 1:41 PM      XR CHEST (2 VW)   Final Result   Low lung volumes with probable subsegmental atelectasis at the right midlung.                **This report has been created using voice recognition software. It may contain minor errors which are inherent in voice recognition technology. **      Final report electronically signed by Dr. Ollie Banerjee MD on 11/13/2020 11:30 AM      CT ABDOMEN PELVIS W IV CONTRAST Additional Contrast? None   Final Result      1. Extensive thickening is seen in the region of the rectum. This is likely on the basis of colitis. 2. Persistence of extensive calcifications in the fascial planes throughout the abdomen and pelvis. This could be on the basis of dermatomyositis. 3. Hepatic steatosis. 4. There is a 3 mm pulmonary nodule near the right lung base. Follow-up is recommended in 6-12 months to assess for stability. **This report has been created using voice recognition software. It may contain minor errors which are inherent in voice recognition technology. **      Final report electronically signed by Dr Brenda Pedersen on 11/7/2020 8:17 PM        Ct Abdomen Pelvis W Iv Contrast Additional Contrast? None    Result Date: 11/7/2020  PROCEDURE: CT ABDOMEN PELVIS W IV CONTRAST CLINICAL INFORMATION: 53-year-old male with rectal bleeding. Possible fistula. Abdominal pain . COMPARISON: CT scan 10/20/2020. TECHNIQUE: 5 mm axial CT images were obtained through the abdomen and pelvis after the administration of intravenous and oral contrast. Coronal and sagittal reconstructions were obtained. All CT scans at this facility use dose modulation, iterative reconstruction, and/or weight-based dosing when appropriate to reduce radiation dose to as low as reasonably achievable. FINDINGS: Atelectasis is noted at the bilateral lung bases. On axial image #1 there is a 3 mm pulmonary nodule in the anterior right lung. There is no pleural effusion. The base of the heart is within acceptable limits. The liver is hypoattenuated. This may be due to fatty infiltration. The gallbladder, pancreas, adrenal glands and spleen are within normal limits.  There are some splenule seen in the left upper quadrant. The kidneys are symmetric in size, shape and degree of enhancement. There is no hydronephrosis. There is no evidence of a small bowel obstruction. There is circumferential wall thickening in the region of the rectum. There is a Dimas catheter within the lumen of the urinary bladder. The aorta and IVC are normal in caliber. Extensive calcifications are again seen in the fascial planes throughout the abdomen and pelvis. This is most notable in the right gluteal region. This finding is similar to the previous exam. Haziness is seen throughout the superficial subcutaneous soft tissues of the ventral abdominal wall. This may be on the basis of previous injections. The osseous structures are intact. There are degenerative changes in the spine. No acute fractures. 1. Extensive thickening is seen in the region of the rectum. This is likely on the basis of colitis. 2. Persistence of extensive calcifications in the fascial planes throughout the abdomen and pelvis. This could be on the basis of dermatomyositis. 3. Hepatic steatosis. 4. There is a 3 mm pulmonary nodule near the right lung base. Follow-up is recommended in 6-12 months to assess for stability. **This report has been created using voice recognition software. It may contain minor errors which are inherent in voice recognition technology. ** Final report electronically signed by Dr Brody Glover on 11/7/2020 8:17 PM      **This report has been created using voice recognition software. It may contain minor errors which are inherent in voice recognition technology. **  Electronically signed by SANDRA Millard CNP on 11/17/2020 at 12:01 PM

## 2020-11-18 LAB
ANION GAP SERPL CALCULATED.3IONS-SCNC: 10 MEQ/L (ref 8–16)
APTT: 162.5 SECONDS (ref 22–38)
APTT: 47.5 SECONDS (ref 22–38)
BUN BLDV-MCNC: 4 MG/DL (ref 7–22)
CALCIUM SERPL-MCNC: 9.1 MG/DL (ref 8.5–10.5)
CHLORIDE BLD-SCNC: 100 MEQ/L (ref 98–111)
CO2: 30 MEQ/L (ref 23–33)
CREAT SERPL-MCNC: 0.7 MG/DL (ref 0.4–1.2)
ERYTHROCYTE [DISTWIDTH] IN BLOOD BY AUTOMATED COUNT: 16.2 % (ref 11.5–14.5)
ERYTHROCYTE [DISTWIDTH] IN BLOOD BY AUTOMATED COUNT: 56.2 FL (ref 35–45)
GFR SERPL CREATININE-BSD FRML MDRD: > 90 ML/MIN/1.73M2
GLUCOSE BLD-MCNC: 106 MG/DL (ref 70–108)
GLUCOSE BLD-MCNC: 107 MG/DL (ref 70–108)
GLUCOSE BLD-MCNC: 124 MG/DL (ref 70–108)
GLUCOSE BLD-MCNC: 132 MG/DL (ref 70–108)
GLUCOSE BLD-MCNC: 148 MG/DL (ref 70–108)
HCT VFR BLD CALC: 37 % (ref 42–52)
HEMOGLOBIN: 11 GM/DL (ref 14–18)
MAGNESIUM: 1.6 MG/DL (ref 1.6–2.4)
MCH RBC QN AUTO: 28.1 PG (ref 26–33)
MCHC RBC AUTO-ENTMCNC: 29.7 GM/DL (ref 32.2–35.5)
MCV RBC AUTO: 94.6 FL (ref 80–94)
PLATELET # BLD: 344 THOU/MM3 (ref 130–400)
PMV BLD AUTO: 11 FL (ref 9.4–12.4)
POTASSIUM REFLEX MAGNESIUM: 3 MEQ/L (ref 3.5–5.2)
POTASSIUM SERPL-SCNC: 3.7 MEQ/L (ref 3.5–5.2)
RBC # BLD: 3.91 MILL/MM3 (ref 4.7–6.1)
SODIUM BLD-SCNC: 140 MEQ/L (ref 135–145)
WBC # BLD: 6.5 THOU/MM3 (ref 4.8–10.8)

## 2020-11-18 PROCEDURE — 80048 BASIC METABOLIC PNL TOTAL CA: CPT

## 2020-11-18 PROCEDURE — 84132 ASSAY OF SERUM POTASSIUM: CPT

## 2020-11-18 PROCEDURE — 85730 THROMBOPLASTIN TIME PARTIAL: CPT

## 2020-11-18 PROCEDURE — 97530 THERAPEUTIC ACTIVITIES: CPT

## 2020-11-18 PROCEDURE — 6360000002 HC RX W HCPCS: Performed by: PHARMACIST

## 2020-11-18 PROCEDURE — 1200000003 HC TELEMETRY R&B

## 2020-11-18 PROCEDURE — 99232 SBSQ HOSP IP/OBS MODERATE 35: CPT | Performed by: PHYSICIAN ASSISTANT

## 2020-11-18 PROCEDURE — 36415 COLL VENOUS BLD VENIPUNCTURE: CPT

## 2020-11-18 PROCEDURE — 6360000002 HC RX W HCPCS: Performed by: COUNSELOR

## 2020-11-18 PROCEDURE — 94760 N-INVAS EAR/PLS OXIMETRY 1: CPT

## 2020-11-18 PROCEDURE — 94640 AIRWAY INHALATION TREATMENT: CPT

## 2020-11-18 PROCEDURE — 82948 REAGENT STRIP/BLOOD GLUCOSE: CPT

## 2020-11-18 PROCEDURE — 97110 THERAPEUTIC EXERCISES: CPT

## 2020-11-18 PROCEDURE — 83735 ASSAY OF MAGNESIUM: CPT

## 2020-11-18 PROCEDURE — 2580000003 HC RX 258: Performed by: FAMILY MEDICINE

## 2020-11-18 PROCEDURE — 85027 COMPLETE CBC AUTOMATED: CPT

## 2020-11-18 PROCEDURE — 6370000000 HC RX 637 (ALT 250 FOR IP): Performed by: PHYSICIAN ASSISTANT

## 2020-11-18 PROCEDURE — 6370000000 HC RX 637 (ALT 250 FOR IP): Performed by: FAMILY MEDICINE

## 2020-11-18 PROCEDURE — 6360000002 HC RX W HCPCS: Performed by: FAMILY MEDICINE

## 2020-11-18 RX ORDER — POTASSIUM CHLORIDE 20 MEQ/1
20 TABLET, EXTENDED RELEASE ORAL 2 TIMES DAILY WITH MEALS
Status: DISCONTINUED | OUTPATIENT
Start: 2020-11-18 | End: 2020-11-19 | Stop reason: HOSPADM

## 2020-11-18 RX ORDER — HEPARIN SODIUM 1000 [USP'U]/ML
5000 INJECTION, SOLUTION INTRAVENOUS; SUBCUTANEOUS ONCE
Status: COMPLETED | OUTPATIENT
Start: 2020-11-18 | End: 2020-11-18

## 2020-11-18 RX ADMIN — CITALOPRAM 30 MG: 20 TABLET, FILM COATED ORAL at 09:20

## 2020-11-18 RX ADMIN — APIXABAN 10 MG: 5 TABLET, FILM COATED ORAL at 20:07

## 2020-11-18 RX ADMIN — POTASSIUM CHLORIDE 10 MEQ: 7.46 INJECTION, SOLUTION INTRAVENOUS at 14:58

## 2020-11-18 RX ADMIN — BUSPIRONE HYDROCHLORIDE 10 MG: 10 TABLET ORAL at 20:07

## 2020-11-18 RX ADMIN — ARIPIPRAZOLE 15 MG: 15 TABLET ORAL at 09:22

## 2020-11-18 RX ADMIN — POTASSIUM CHLORIDE 20 MEQ: 1500 TABLET, EXTENDED RELEASE ORAL at 16:51

## 2020-11-18 RX ADMIN — POTASSIUM CHLORIDE 10 MEQ: 7.46 INJECTION, SOLUTION INTRAVENOUS at 06:19

## 2020-11-18 RX ADMIN — FOLIC ACID 1 MG: 1 TABLET ORAL at 09:22

## 2020-11-18 RX ADMIN — MODAFINIL 100 MG: 100 TABLET ORAL at 11:19

## 2020-11-18 RX ADMIN — POTASSIUM CHLORIDE 10 MEQ: 7.46 INJECTION, SOLUTION INTRAVENOUS at 04:00

## 2020-11-18 RX ADMIN — FUROSEMIDE 40 MG: 40 TABLET ORAL at 09:22

## 2020-11-18 RX ADMIN — PANTOPRAZOLE SODIUM 40 MG: 40 TABLET, DELAYED RELEASE ORAL at 05:21

## 2020-11-18 RX ADMIN — ALLOPURINOL 500 MG: 300 TABLET ORAL at 09:19

## 2020-11-18 RX ADMIN — FERROUS SULFATE TAB 325 MG (65 MG ELEMENTAL FE) 325 MG: 325 (65 FE) TAB at 09:19

## 2020-11-18 RX ADMIN — MULTIPLE VITAMINS W/ MINERALS TAB 1 TABLET: TAB at 09:19

## 2020-11-18 RX ADMIN — APIXABAN 10 MG: 5 TABLET, FILM COATED ORAL at 13:53

## 2020-11-18 RX ADMIN — HEPARIN SODIUM 11 UNITS/KG/HR: 10000 INJECTION, SOLUTION INTRAVENOUS at 03:42

## 2020-11-18 RX ADMIN — HYDRALAZINE HYDROCHLORIDE 50 MG: 50 TABLET, FILM COATED ORAL at 09:22

## 2020-11-18 RX ADMIN — HEPARIN SODIUM 5000 UNITS: 1000 INJECTION, SOLUTION INTRAVENOUS; SUBCUTANEOUS at 05:22

## 2020-11-18 RX ADMIN — POTASSIUM CHLORIDE 10 MEQ: 7.46 INJECTION, SOLUTION INTRAVENOUS at 08:34

## 2020-11-18 RX ADMIN — FERROUS SULFATE TAB 325 MG (65 MG ELEMENTAL FE) 325 MG: 325 (65 FE) TAB at 16:51

## 2020-11-18 RX ADMIN — SODIUM CHLORIDE, PRESERVATIVE FREE 10 ML: 5 INJECTION INTRAVENOUS at 20:25

## 2020-11-18 RX ADMIN — POTASSIUM CHLORIDE 10 MEQ: 7.46 INJECTION, SOLUTION INTRAVENOUS at 12:37

## 2020-11-18 RX ADMIN — STANDARDIZED SENNA CONCENTRATE 8.6 MG: 8.6 TABLET ORAL at 09:22

## 2020-11-18 RX ADMIN — GABAPENTIN 400 MG: 400 CAPSULE ORAL at 20:07

## 2020-11-18 RX ADMIN — GLYCOPYRROLATE AND FORMOTEROL FUMARATE 2 PUFF: 9; 4.8 AEROSOL, METERED RESPIRATORY (INHALATION) at 08:59

## 2020-11-18 RX ADMIN — HYDRALAZINE HYDROCHLORIDE 50 MG: 50 TABLET, FILM COATED ORAL at 20:07

## 2020-11-18 RX ADMIN — BUSPIRONE HYDROCHLORIDE 10 MG: 10 TABLET ORAL at 09:19

## 2020-11-18 RX ADMIN — ATORVASTATIN CALCIUM 40 MG: 40 TABLET, FILM COATED ORAL at 20:08

## 2020-11-18 RX ADMIN — POTASSIUM CHLORIDE 10 MEQ: 7.46 INJECTION, SOLUTION INTRAVENOUS at 01:42

## 2020-11-18 RX ADMIN — POTASSIUM CHLORIDE 10 MEQ: 7.46 INJECTION, SOLUTION INTRAVENOUS at 10:42

## 2020-11-18 RX ADMIN — GABAPENTIN 400 MG: 400 CAPSULE ORAL at 09:23

## 2020-11-18 RX ADMIN — GLYCOPYRROLATE AND FORMOTEROL FUMARATE 2 PUFF: 9; 4.8 AEROSOL, METERED RESPIRATORY (INHALATION) at 20:08

## 2020-11-18 ASSESSMENT — PAIN DESCRIPTION - DESCRIPTORS: DESCRIPTORS: ACHING

## 2020-11-18 ASSESSMENT — PAIN SCALES - GENERAL: PAINLEVEL_OUTOF10: 4

## 2020-11-18 ASSESSMENT — PAIN DESCRIPTION - FREQUENCY: FREQUENCY: INTERMITTENT

## 2020-11-18 ASSESSMENT — PAIN DESCRIPTION - LOCATION: LOCATION: FOOT

## 2020-11-18 ASSESSMENT — PAIN DESCRIPTION - ONSET: ONSET: ON-GOING

## 2020-11-18 ASSESSMENT — PAIN DESCRIPTION - PAIN TYPE: TYPE: CHRONIC PAIN

## 2020-11-18 NOTE — PROGRESS NOTES
Progress note: Infectious diseases    Patient - Alfred Neff,  Age - 44 y.o.    - 1970      Room Number - 5K-22/022-A   MRN -  [id number]   Acct # - [de-identified]  Date of Admission -  2020  4:57 PM    SUBJECTIVE:   No  new issues. OBJECTIVE   VITALS    height is 5' 7\" (1.702 m) and weight is 279 lb (126.6 kg). His oral temperature is 98.4 °F (36.9 °C). His blood pressure is 119/78 and his pulse is 79. His respiration is 16 and oxygen saturation is 95%. Wt Readings from Last 3 Encounters:   20 279 lb (126.6 kg)   20 276 lb 1.6 oz (125.2 kg)   09/15/20 281 lb 6.4 oz (127.6 kg)       I/O (24 Hours)    Intake/Output Summary (Last 24 hours) at 2020 1619  Last data filed at 2020 1241  Gross per 24 hour   Intake 1805 ml   Output 1350 ml   Net 455 ml       General Appearance  Awake, alert, oriented,  Obese. HEENT - normocephalic, atraumatic, pale conjunctiva,  anicteric sclera. Neck - Supple, no mass. Lungs -  Bilateral  air entry, diminished breath sound. Cardiovascular - Heart sounds are normal.     Abdomen - soft, not distended, nontender, perianal open wound  Neurologic - awake and oriented. Skin - No bruising or bleeding. Extremities - no swelling, chronic skin changes.     MEDICATIONS:      apixaban  10 mg Oral BID    potassium chloride  20 mEq Oral BID WC    sodium chloride flush  10 mL Intravenous 2 times per day    allopurinol  500 mg Oral Daily    ARIPiprazole  15 mg Oral Daily    atorvastatin  40 mg Oral Nightly    busPIRone  10 mg Oral BID    citalopram  30 mg Oral Daily    ferrous sulfate  325 mg Oral BID WC    folic acid  1 mg Oral Daily    furosemide  40 mg Oral Daily    gabapentin  400 mg Oral BID    glycopyrrolate-formoterol  2 puff Inhalation BID    hydrALAZINE  50 mg Oral BID    [Held by provider] insulin glargine  10 Units Subcutaneous Nightly  Hypogonadism, male E29.1    Major depression F32.9    Morbid obesity (HCC) E66.01    DURAN (nonalcoholic steatohepatitis) K75.81    KIARA treated with BiPAP G47.33    Vitamin D deficiency E55.9    Normocytic anemia D64.9    Physical deconditioning R53.81    Mood disorder (HCC) F39    Hallucinations R44.3    GERD (gastroesophageal reflux disease) K21.9    Wound of buttock S31.809A    Primary osteoarthritis of left hip M16.12    Acute on chronic anemia D64.9    Dysphagia R13.10    Hypertension, essential I10    Dyslipidemia E78.5    Aortic stenosis, mild to moderate I35.0    Perirectal abscess K61.1         ASSESSMENT/PLAN   Rectal bleed due to ulceration: improved. Acute DVT left lower leg. Recent hx of COVID-19: slowly recovering  Continue current supportive care  ID will see him as needed.   Yris Cavazos MD, FACP 11/18/2020 4:19 PM

## 2020-11-18 NOTE — PROGRESS NOTES
[facility]  INPATIENT PHYSICAL THERAPY  DAILY NOTE  STR ONC MED 5K - 5K-22/022-A    Time In: 3640  Time Out: 1435  Timed Code Treatment Minutes: 23 Minutes  Minutes: 23          Date: 2020  Patient Name: Leti Booker,  Gender:  male        MRN: [id number]  : 1970  (44 y.o.)     Referring Practitioner: Michael Villeda CNP  Diagnosis: Perirectal abscess  Additional Pertinent Hx: 44 y.o. male with multiple co-morbidities, with a prolonged hospitalization with covid pna, and multiple issues, discharged on  to [facility] who presented to [address] [place] [address] with rectal bleeding. Patient had similar issues while admitted but at the [facility], patient was having rectal bleed, drainage and pain in the rectum. Per ER, pt has swelling in the area, discharge and redness. Case was discussed with general surgery who recommend admission and consult to surgery. Patients labs were not concerning and actually better than when he was discharged. His hgb is improved as well. Pt was given rocephin and flagyl in the ER, and admitted to the floor with consults to Surgery. Prior Level of Function:  Lives With: Alone  Type of Home: Facility([facility])  Home Layout: One level  Home Access: Level entry  Home Equipment: [address], Rolling walker   Bathroom Shower/Tub: Walk-in shower  Bathroom Toilet: Bedside commode  Bathroom Accessibility: Accessible    Receives Help From: Other (comment)(staff as available)  ADL Assistance: Needs assistance  Homemaking Assistance: Needs assistance  Homemaking Responsibilities: No  Transfer Assistance: Needs assistance  Active : No  Additional Comments: Pt has been in and out of the hospital last couple months, requires +2 to stand with therapy, not ambulating currently; pt states staff has been using megan Fangtekdy; prior to Sept was living I'ly in an apt. Assist needed for doing self care.     Restrictions/Precautions:  Restrictions/Precautions: Fall Risk SUBJECTIVE: RN approved session and present to assist with transfer. PAIN: 0/10: Denies pain. Pt states L LE is numb. OBJECTIVE:  Bed Mobility:  Rolling to Left: Supervision   Supine to Sit: Supervision    Transfers:  Sit to Stand: Stand By Assistance, Into lenora steady. Pt adamant that he cannot take any steps to complete stand pivot. Stand to Sit:Stand By Assistance  Bed to Chair: LENORA steady    Balance:  Static Sitting Balance:  Independent    Exercise:  Patient was guided in 1 set(s) 15 reps of exercise to both lower extremities. Glut sets, Seated marches, Seated hamstring curls, Seated heel/toe raises, Long arc quads and Seated isometric hip adduction. Exercises were completed for increased independence with functional mobility. Functional Outcome Measures: Completed       ASSESSMENT:  Assessment: Patient progressing toward established goals. Activity Tolerance:  Patient tolerance of  treatment: good. Equipment Recommendations:Equipment Needed: No  Discharge Recommendations:  Subacute/Skilled Nursing Facility    Plan: Times per week: 3-5x GM  Current Treatment Recommendations: Strengthening, Functional Mobility Training, Transfer Training, Balance Training, Endurance Training, Equipment Evaluation, Education, & procurement, Safety Education & Training, Patient/Caregiver Education & Training    Patient Education  Patient Education: Plan of Care, [employer] Mobility    Goals:  Patient goals : to get stronger  Short term goals  Time Frame for Short term goals: by discharge  Short term goal 1: Pt to transfer supine <--> sit CGA to enable pt to get in/out of bed. Short term goal 2: Pt to transfer sit <--> stand CGA in lenora stedy or with RW for increased functional mobility. Short term goal 3: PT to assess ambulation when able. Long term goals  Time Frame for Long term goals : NA due to short length of stay.     Following session, patient left in safe position with all fall risk precautions in place.

## 2020-11-18 NOTE — PROGRESS NOTES
Hospitalist Progress Note      Patient:  James Velazquez    Unit/Bed:5K-22/022-A  YOB: 1970  MRN: [id number]   Acct: [de-identified]   PCP: No primary care provider on file. Date of Admission: 11/7/2020    Assessment/Plan:    1. Perirectal ulceration with bleeding: CT abdomen shows colitis. General surgery consulted, no surgical intervention needed. Infectious disease consulted. GI consulted, EGD on 11/14. Colonoscopy showed healing perianal ulceration and focal colitis in the ascending colon. Antibiotics stopped on 11/16. Wound and ostomy consulted. Continue zinc oxide ointment. 2. Acute LLE DVT: Likely secondary to holding anticoagulation due to bleeding from perirectal ulceration. Patient started on heparin drip, transition to [facility] on 11/18. 3. Hypokalemia: Potassium continues to be low at 3.0. Potassium SSI ordered. Potassium twice daily ordered as well. 4. Chronic respiratory failure, OAC: BiPAP at night with naps. 5. Urinary retention: Dimas in place. Urology follow-up. 6. IDDM: Continue insulin regimen. 7. Anemia, chronic: Globin stable around 12.  8. Status post COVID-19 pneumonia and prolonged hospital stay with intubation  9. Physical deconditioning: Physical therapy and Occupational Therapy. Chief Complaint: Rectal Bleed     Initial H and P:-    \"50 y. o. male with multiple co-morbidities, with a prolonged hospitalization with covid pna, and multiple issues, discharged on 11/7 to [facility] presented to [address] [place] [address] with rectal bleeding. Patient had similar issues while admitted but at the [facility], patient was having rectal bleed, drainage and pain in the rectum. Per ER, pt has swelling in the area, discharge and redness. Case was discussed with general surgery who recommend admission and consult to surgery. Patients labs were not concerning and actually better than when he was discharged.  His hgb is improved as well. Pt was given rocephin and flagyl in the ER, and admitted to the floor with consults to Surgery. \"    Subjective (past 24 hours):   No acute events overnight. Heparin drip was transitioned to [address] [place] [address]. Patient worked with therapy and tolerated it well. No issues or complaints at this time. Past medical history, family history, social history and allergies reviewed again and is unchanged since admission. ROS patient admits to muscle aches and fatigue.   Patient denies chest pain, shortness of breath, abdominal pain, headache  Medications:  Reviewed    Infusion Medications    dextrose       Scheduled Medications    apixaban  10 mg Oral BID    potassium chloride  20 mEq Oral BID WC    sodium chloride flush  10 mL Intravenous 2 times per day    allopurinol  500 mg Oral Daily    ARIPiprazole  15 mg Oral Daily    atorvastatin  40 mg Oral Nightly    busPIRone  10 mg Oral BID    citalopram  30 mg Oral Daily    ferrous sulfate  325 mg Oral BID WC    folic acid  1 mg Oral Daily    furosemide  40 mg Oral Daily    gabapentin  400 mg Oral BID    glycopyrrolate-formoterol  2 puff Inhalation BID    hydrALAZINE  50 mg Oral BID    [Held by provider] insulin glargine  10 Units Subcutaneous Nightly    modafinil  100 mg Oral Daily    therapeutic multivitamin-minerals  1 tablet Oral Daily    pantoprazole  40 mg Oral Daily    senna  1 tablet Oral BID    [Held by provider] tamsulosin  0.4 mg Oral Nightly    insulin lispro  0-18 Units Subcutaneous TID WC    insulin lispro  0-9 Units Subcutaneous Nightly    polyethylene glycol  17 g Oral Every Other Day     PRN Meds: traMADol, sodium chloride flush, acetaminophen **OR** acetaminophen, polyethylene glycol, promethazine **OR** ondansetron, magnesium sulfate, potassium chloride **OR** potassium alternative oral replacement **OR** potassium chloride, albuterol sulfate HFA, benzocaine, guaiFENesin, ondansetron, glucose, dextrose, glucagon (rDNA), dextrose      Intake/Output Summary (Last 24 hours) at 11/18/2020 1548  Last data filed at 11/18/2020 1241  Gross per 24 hour   Intake 1805 ml   Output 2050 ml   Net -245 ml       Diet:  DIET CARB CONTROL; Exam:  BP (!) 148/102   Pulse 77   Temp 98.4 °F (36.9 °C) (Oral)   Resp 16   Ht 5' 7\" (1.702 m)   Wt 279 lb (126.6 kg)   SpO2 98%   BMI 43.70 kg/m²   General appearance: Chronically ill-appearing black male, obese  HEENT: Pupils equal, round, and reactive to light. Conjunctivae/corneas clear. Neck: Supple, with full range of motion. No jugular venous distention. Trachea midline. Respiratory:  Normal respiratory effort on RA. Clear to auscultation, bilaterally without Rales/Wheezes/Rhonchi. Cardiovascular: Regular rate and rhythm with normal S1/S2 without murmurs, rubs or gallops. Abdomen: Soft, non-tender, non-distended with normal bowel sounds. Musculoskeletal: passive and active ROM x 4 extremities. Skin: Skin color, texture, turgor normal.  No rashes or lesions. Neurologic:  Neurovascularly intact without any focal sensory/motor deficits.  Cranial nerves: II-XII intact, grossly non-focal.  Psychiatric: Alert and oriented, thought content appropriate, normal insight  Capillary Refill: Brisk,< 3 seconds   Peripheral Pulses: +2 palpable, equal bilaterally     Labs:   Recent Labs     11/16/20  1823 11/17/20  0339 11/18/20  0351   WBC 8.2 6.9 6.5   HGB 10.8* 11.0* 11.0*   HCT 35.1* 35.7* 37.0*    358 344     Recent Labs     11/16/20  0842 11/17/20  0339 11/17/20 2001 11/18/20  0351    141  --  140   K 2.8* 2.7* 3.0* 3.0*    99  --  100   CO2 27 31  --  30   BUN 4* 4*  --  4*   CREATININE 0.8 0.7  --  0.7   CALCIUM 9.2 9.0  --  9.1     Microbiology:    Urinalysis:      Lab Results   Component Value Date    NITRU NEGATIVE 11/03/2020    WBCUA 25-50 11/03/2020    BACTERIA FEW 11/03/2020    RBCUA 3-5 11/03/2020    BLOODU NEGATIVE 11/03/2020    SPECGRAV >=1.030 10/06/2020    GLUCOSEU NEGATIVE 11/03/2020       Radiology:  VL DUP LOWER EXTREMITY VENOUS LEFT   Final Result   Findings indicate the presence of acute DVT in one of the left peroneal veins and one left posterior tibial veins. All other deep veins are clear. Perfect serve Voice message left with Mollymary Eaton               **This report has been created using voice recognition software. It may contain minor errors which are inherent in voice recognition technology. **      Final report electronically signed by Dr. Tye Nelson on 11/16/2020 1:41 PM      XR CHEST (2 VW)   Final Result   Low lung volumes with probable subsegmental atelectasis at the right midlung. **This report has been created using voice recognition software. It may contain minor errors which are inherent in voice recognition technology. **      Final report electronically signed by Dr. Belén Pompa MD on 11/13/2020 11:30 AM      CT ABDOMEN PELVIS W IV CONTRAST Additional Contrast? None   Final Result      1. Extensive thickening is seen in the region of the rectum. This is likely on the basis of colitis. 2. Persistence of extensive calcifications in the fascial planes throughout the abdomen and pelvis. This could be on the basis of dermatomyositis. 3. Hepatic steatosis. 4. There is a 3 mm pulmonary nodule near the right lung base. Follow-up is recommended in 6-12 months to assess for stability. **This report has been created using voice recognition software. It may contain minor errors which are inherent in voice recognition technology. **      Final report electronically signed by Dr Fabiola Napoles on 11/7/2020 8:17 PM        Electronically signed by NISH Leonardo on 11/18/2020 at 3:48 PM

## 2020-11-18 NOTE — PROGRESS NOTES
Heparin Consult  Lab Results   Component Value Date    APTT 47.5 11/18/2020     Lab Results   Component Value Date    HGB 11.0 11/18/2020    HCT 37.0 11/18/2020     11/18/2020    INR 1.14 11/07/2020       Current Rate: 11 units per kg per hour    Plan:  Bolus: 40 units/kg  Rate: increase to 14 units/kg/hr  (IV access was lost for ~ 2 hours)  Next aPTT: 1100    Sara Villafana RPh, BCPS, BCGP  11/18/2020     5:07 AM

## 2020-11-19 VITALS
TEMPERATURE: 98.3 F | SYSTOLIC BLOOD PRESSURE: 128 MMHG | WEIGHT: 279 LBS | BODY MASS INDEX: 43.79 KG/M2 | OXYGEN SATURATION: 96 % | HEIGHT: 67 IN | DIASTOLIC BLOOD PRESSURE: 61 MMHG | RESPIRATION RATE: 16 BRPM | HEART RATE: 83 BPM

## 2020-11-19 LAB
ANION GAP SERPL CALCULATED.3IONS-SCNC: 11 MEQ/L (ref 8–16)
BUN BLDV-MCNC: 4 MG/DL (ref 7–22)
CALCIUM SERPL-MCNC: 9.2 MG/DL (ref 8.5–10.5)
CHLORIDE BLD-SCNC: 103 MEQ/L (ref 98–111)
CO2: 29 MEQ/L (ref 23–33)
CREAT SERPL-MCNC: 0.8 MG/DL (ref 0.4–1.2)
ERYTHROCYTE [DISTWIDTH] IN BLOOD BY AUTOMATED COUNT: 16.3 % (ref 11.5–14.5)
ERYTHROCYTE [DISTWIDTH] IN BLOOD BY AUTOMATED COUNT: 55.4 FL (ref 35–45)
GFR SERPL CREATININE-BSD FRML MDRD: > 90 ML/MIN/1.73M2
GLUCOSE BLD-MCNC: 105 MG/DL (ref 70–108)
GLUCOSE BLD-MCNC: 137 MG/DL (ref 70–108)
GLUCOSE BLD-MCNC: 150 MG/DL (ref 70–108)
GLUCOSE BLD-MCNC: 195 MG/DL (ref 70–108)
HCT VFR BLD CALC: 36.2 % (ref 42–52)
HEMOGLOBIN: 11 GM/DL (ref 14–18)
MAGNESIUM: 1.4 MG/DL (ref 1.6–2.4)
MCH RBC QN AUTO: 28.5 PG (ref 26–33)
MCHC RBC AUTO-ENTMCNC: 30.4 GM/DL (ref 32.2–35.5)
MCV RBC AUTO: 93.8 FL (ref 80–94)
PLATELET # BLD: 343 THOU/MM3 (ref 130–400)
PMV BLD AUTO: 10.8 FL (ref 9.4–12.4)
POTASSIUM REFLEX MAGNESIUM: 3.5 MEQ/L (ref 3.5–5.2)
RBC # BLD: 3.86 MILL/MM3 (ref 4.7–6.1)
SARS-COV-2, NAAT: NOT DETECTED
SODIUM BLD-SCNC: 143 MEQ/L (ref 135–145)
WBC # BLD: 5.7 THOU/MM3 (ref 4.8–10.8)

## 2020-11-19 PROCEDURE — 83735 ASSAY OF MAGNESIUM: CPT

## 2020-11-19 PROCEDURE — 82948 REAGENT STRIP/BLOOD GLUCOSE: CPT

## 2020-11-19 PROCEDURE — U0002 COVID-19 LAB TEST NON-CDC: HCPCS

## 2020-11-19 PROCEDURE — 85027 COMPLETE CBC AUTOMATED: CPT

## 2020-11-19 PROCEDURE — 80048 BASIC METABOLIC PNL TOTAL CA: CPT

## 2020-11-19 PROCEDURE — 99239 HOSP IP/OBS DSCHRG MGMT >30: CPT | Performed by: SURGERY

## 2020-11-19 PROCEDURE — 94640 AIRWAY INHALATION TREATMENT: CPT

## 2020-11-19 PROCEDURE — 2580000003 HC RX 258: Performed by: FAMILY MEDICINE

## 2020-11-19 PROCEDURE — 6370000000 HC RX 637 (ALT 250 FOR IP): Performed by: PHYSICIAN ASSISTANT

## 2020-11-19 PROCEDURE — 94761 N-INVAS EAR/PLS OXIMETRY MLT: CPT

## 2020-11-19 PROCEDURE — 94760 N-INVAS EAR/PLS OXIMETRY 1: CPT

## 2020-11-19 PROCEDURE — 6370000000 HC RX 637 (ALT 250 FOR IP): Performed by: FAMILY MEDICINE

## 2020-11-19 PROCEDURE — 36415 COLL VENOUS BLD VENIPUNCTURE: CPT

## 2020-11-19 RX ORDER — POLYETHYLENE GLYCOL 3350 17 G/17G
17 POWDER, FOR SOLUTION ORAL DAILY PRN
Qty: 527 G | Refills: 1 | Status: SHIPPED | OUTPATIENT
Start: 2020-11-19 | End: 2020-12-19

## 2020-11-19 RX ADMIN — HYDRALAZINE HYDROCHLORIDE 50 MG: 50 TABLET, FILM COATED ORAL at 09:23

## 2020-11-19 RX ADMIN — APIXABAN 10 MG: 5 TABLET, FILM COATED ORAL at 09:21

## 2020-11-19 RX ADMIN — INSULIN LISPRO 3 UNITS: 100 INJECTION, SOLUTION INTRAVENOUS; SUBCUTANEOUS at 12:46

## 2020-11-19 RX ADMIN — POTASSIUM CHLORIDE 20 MEQ: 1500 TABLET, EXTENDED RELEASE ORAL at 09:19

## 2020-11-19 RX ADMIN — BUSPIRONE HYDROCHLORIDE 10 MG: 10 TABLET ORAL at 09:21

## 2020-11-19 RX ADMIN — ALLOPURINOL 500 MG: 300 TABLET ORAL at 09:22

## 2020-11-19 RX ADMIN — SODIUM CHLORIDE, PRESERVATIVE FREE 10 ML: 5 INJECTION INTRAVENOUS at 09:27

## 2020-11-19 RX ADMIN — GLYCOPYRROLATE AND FORMOTEROL FUMARATE 2 PUFF: 9; 4.8 AEROSOL, METERED RESPIRATORY (INHALATION) at 09:41

## 2020-11-19 RX ADMIN — MODAFINIL 100 MG: 100 TABLET ORAL at 13:22

## 2020-11-19 RX ADMIN — FOLIC ACID 1 MG: 1 TABLET ORAL at 09:23

## 2020-11-19 RX ADMIN — INSULIN LISPRO 3 UNITS: 100 INJECTION, SOLUTION INTRAVENOUS; SUBCUTANEOUS at 17:00

## 2020-11-19 RX ADMIN — CITALOPRAM: 20 TABLET, FILM COATED ORAL at 09:21

## 2020-11-19 RX ADMIN — FERROUS SULFATE TAB 325 MG (65 MG ELEMENTAL FE) 325 MG: 325 (65 FE) TAB at 09:21

## 2020-11-19 RX ADMIN — FUROSEMIDE 40 MG: 40 TABLET ORAL at 10:15

## 2020-11-19 RX ADMIN — GABAPENTIN 400 MG: 400 CAPSULE ORAL at 09:21

## 2020-11-19 RX ADMIN — FERROUS SULFATE TAB 325 MG (65 MG ELEMENTAL FE) 325 MG: 325 (65 FE) TAB at 16:19

## 2020-11-19 RX ADMIN — ARIPIPRAZOLE 15 MG: 15 TABLET ORAL at 09:21

## 2020-11-19 RX ADMIN — POTASSIUM CHLORIDE 20 MEQ: 1500 TABLET, EXTENDED RELEASE ORAL at 16:19

## 2020-11-19 RX ADMIN — PANTOPRAZOLE SODIUM 40 MG: 40 TABLET, DELAYED RELEASE ORAL at 06:20

## 2020-11-19 RX ADMIN — MULTIPLE VITAMINS W/ MINERALS TAB 1 TABLET: TAB at 09:21

## 2020-11-19 ASSESSMENT — PAIN SCALES - GENERAL
PAINLEVEL_OUTOF10: 5

## 2020-11-19 ASSESSMENT — PAIN DESCRIPTION - LOCATION
LOCATION: FOOT

## 2020-11-19 ASSESSMENT — PAIN DESCRIPTION - DESCRIPTORS: DESCRIPTORS: ACHING

## 2020-11-19 ASSESSMENT — PAIN DESCRIPTION - ONSET
ONSET: ON-GOING

## 2020-11-19 ASSESSMENT — PAIN DESCRIPTION - ORIENTATION
ORIENTATION: LEFT

## 2020-11-19 ASSESSMENT — PAIN DESCRIPTION - PAIN TYPE
TYPE: CHRONIC PAIN

## 2020-11-19 NOTE — PROGRESS NOTES
Report called to [facility]. Questions answered. Pt being transported per [facility] at  [phone] with personal belongings.

## 2020-11-19 NOTE — CARE COORDINATION
11/19/20, 1:35 PM EST    DISCHARGE PLANNING EVALUATION    Patient is to be discharged today. Spoke with Sabiha with [facility] and advised her of planned discharge. She advised that pre cert is no longer good however, patient can return as [facility] is not requiring that pre cert be started prior to admission to facility (due to COVID)-they will start it once patient arrives at the facility. Transport arranged with [facility] for 5:30 pm-paperwork faxed. AVS faxed to [facility]. Patient updated on the above. Call to father and advised him of patient discharge and transport time. Number provided to [facility] for report. No other needs at this time. 11/19/20, 2:11 PM EST    Patient goals/plan/ treatment preferences discussed by  and . Patient goals/plan/ treatment preferences reviewed with patient/ family. Patient/ family verbalize understanding of discharge plan and are in agreement with goal/plan/treatment preferences. Understanding was demonstrated using the teach back method. AVS provided by RN at time of discharge, which includes all necessary medical information pertaining to the patients current course of illness, treatment, post-discharge goals of care, and treatment preferences.     Services After Discharge  Services At/After Discharge: Skilled Therapy, Nursing Services, In ambulance, Aide Services, Transport([facility])   IMM Letter  IMM Letter given to Patient/Family/Significant other/Guardian/POA/by[de-identified] MORELIA  IMM Letter date given[de-identified] 11/19/20  IMM Letter time given[de-identified] 3064

## 2020-11-19 NOTE — PROGRESS NOTES
Gastroenterology  Progress Note    11/19/2020 5:44 PM  Subjective:   Admit Date: 11/7/2020    Interval History: GI bleed with bright red blood as well as melena history of rectal ulcer due to rectal tube placed during COVID-19 recovery time. Melanotic stools or peptic ulcer disease. Patient breathing is fine now on room air. Morbidly obese and history of chronic alcohol abuse in the past the present after discharge to nursing home with GI bleed. Colonoscopy finding discussed with patient  .   Patient notes he is being treated for deeper thrombosis watch closely for GI bleed with anticoagulations  11/19 tolerating diet no abdominal pain no GI bleed will be discharged to nursing home today to follow-up with GI clinic after discharge  Diet: DIET CARB CONTROL;    Medications:   Scheduled Meds:    apixaban  10 mg Oral BID    potassium chloride  20 mEq Oral BID WC    sodium chloride flush  10 mL Intravenous 2 times per day    allopurinol  500 mg Oral Daily    ARIPiprazole  15 mg Oral Daily    atorvastatin  40 mg Oral Nightly    busPIRone  10 mg Oral BID    citalopram  30 mg Oral Daily    ferrous sulfate  325 mg Oral BID WC    folic acid  1 mg Oral Daily    furosemide  40 mg Oral Daily    gabapentin  400 mg Oral BID    glycopyrrolate-formoterol  2 puff Inhalation BID    hydrALAZINE  50 mg Oral BID    [Held by provider] insulin glargine  10 Units Subcutaneous Nightly    modafinil  100 mg Oral Daily    therapeutic multivitamin-minerals  1 tablet Oral Daily    pantoprazole  40 mg Oral Daily    senna  1 tablet Oral BID    [Held by provider] tamsulosin  0.4 mg Oral Nightly    insulin lispro  0-18 Units Subcutaneous TID WC    insulin lispro  0-9 Units Subcutaneous Nightly    polyethylene glycol  17 g Oral Every Other Day     Continuous Infusions:    dextrose         CBC:   Recent Labs     11/17/20  0339 11/18/20  0351 11/19/20  0358   WBC 6.9 6.5 5.7   HGB 11.0* 11.0* 11.0*    344 343     BMP: Recent Labs     11/17/20  0339  11/18/20  0351 11/18/20  1827 11/19/20  0358     --  140  --  143   K 2.7*   < > 3.0* 3.7 3.5   CL 99  --  100  --  103   CO2 31  --  30  --  29   BUN 4*  --  4*  --  4*   CREATININE 0.7  --  0.7  --  0.8   GLUCOSE 117*  --  106  --  137*    < > = values in this interval not displayed. Hepatic: No results for input(s): AST, ALT, ALB, BILITOT, ALKPHOS in the last 72 hours. INR: No results for input(s): INR in the last 72 hours. Imaging:  No results found for this or any previous visit. Results for orders placed during the hospital encounter of 11/07/20   CT ABDOMEN PELVIS W IV CONTRAST Additional Contrast? None    Narrative PROCEDURE: CT ABDOMEN PELVIS W IV CONTRAST    CLINICAL INFORMATION: 78-year-old male with rectal bleeding. Possible fistula. Abdominal pain . COMPARISON: CT scan 10/20/2020. TECHNIQUE: 5 mm axial CT images were obtained through the abdomen and pelvis after the administration of intravenous and oral contrast. Coronal and sagittal reconstructions were obtained. All CT scans at this facility use dose modulation, iterative reconstruction, and/or weight-based dosing when appropriate to reduce radiation dose to as low as reasonably achievable. FINDINGS:  Atelectasis is noted at the bilateral lung bases. On axial image #1 there is a 3 mm pulmonary nodule in the anterior right lung. There is no pleural effusion. The base of the heart is within acceptable limits. The liver is hypoattenuated. This may be due to fatty infiltration. The gallbladder, pancreas, adrenal glands and spleen are within normal limits. There are some splenule seen in the left upper quadrant. The kidneys are symmetric in size, shape and degree of enhancement. There is no hydronephrosis. There is no evidence of a small bowel obstruction. There is circumferential wall thickening in the region of the rectum.     There is a Dimas catheter within the lumen of the urinary bladder. The aorta and IVC are normal in caliber. Extensive calcifications are again seen in the fascial planes throughout the abdomen and pelvis. This is most notable in the right gluteal region. This finding is similar to the previous exam.    Haziness is seen throughout the superficial subcutaneous soft tissues of the ventral abdominal wall. This may be on the basis of previous injections. The osseous structures are intact. There are degenerative changes in the spine. No acute fractures. Impression 1. Extensive thickening is seen in the region of the rectum. This is likely on the basis of colitis. 2. Persistence of extensive calcifications in the fascial planes throughout the abdomen and pelvis. This could be on the basis of dermatomyositis. 3. Hepatic steatosis. 4. There is a 3 mm pulmonary nodule near the right lung base. Follow-up is recommended in 6-12 months to assess for stability. **This report has been created using voice recognition software. It may contain minor errors which are inherent in voice recognition technology. **    Final report electronically signed by Dr Brenda Pedersen on 2020 8:17 PM     No results found for this or any previous visit. No results found for this or any previous visit. [place] [address]                                 OPERATIVE REPORT     PATIENT NAME: Gillian Quan                :        1970  MED REC NO:   [id number]                           ROOM:  ACCOUNT NO:   [de-identified]                           ADMIT DATE: 2020  PROVIDER:     Kenia Colon M.D.     DATE OF PROCEDURE:  11/15/2020     INDICATIONS:  The patient with lower GI bleed. History of COVID-19  pneumonia. Recovered at this time. He had perianal ulcers after  placement of rectal tube. He had an upper endoscopy yesterday done to  evaluate.   No gross abnormality was healing. No pus seen. No ulcers at this time. Scope was withdrawn with no  immediate complications.     IMPRESSION:  1. Healing perianal ulcerations, had improved significantly. 2.  Focal colitis in the ascending, biopsy obtained. 3.  No extension of the ulcer in the rectum was seen.     PLAN:  1. Resume carb-controlled diet. 2.  Monitor the patient closely. 3.  If the patient has discomfort, fiber supplement and Metamucil to  make the bowel movement soft and lubricated, easier to defecate. 4.  Follow up the biopsy results with the GI clinic after discharge.           JASMYN Daniel. [address] [place], [place] [id number]                                 OPERATIVE REPORT     PATIENT NAME: Marcie Dave                :        1970  MED REC NO:   [id number]                           ROOM:       0022  ACCOUNT NO:   [de-identified]                           ADMIT DATE: 2020  PROVIDER:     Nichole Dela Cruz M.D.     DATE OF PROCEDURE:  2020     AUTO PAN:  [id number]. VERIFY CC INFO.     INDICATION:  The patient is with history of melenic stool as well as  bright red blood per rectum. Plan today for EGD to evaluate.     ASA CLASSIFICATION:  III.     SURGEON:  Nichole Dela Cruz MD.     Estimated blood loss in none.     DESCRIPTION OF THE PROCEDURE:  The patient was brought to the GI lab. Consent was obtained. The risks involved with the procedure were  explained to the patient. Informed consent was obtained. The patient  was monitored during the procedure with pulse oximetry, blood pressure  monitoring, and oxygen by nasal cannula.   Sedation by incremental doses  of IV Versed, total 3 mg of Versed and 75 mcg of fentanyl given in  incremental dosage during the procedure to achieve continuous conscious  sedation.     PROCEDURE PERFORMED:  EGD:  A standard video 190 Olympus upper scope was  advanced under direct vision from the oral cavity up to third part of  the duodenum. Esophagus showed feature of mild acid reflux in the  distal esophagus. No erosion or ulceration seen. Scope advanced to the  stomach. Retroflex examination of the cardia revealed normal cardia. Mild gastritis seen in the distal part of the antrum, not very  significant. Duodenum appears normal. No GI bleeding seen. No erosion  or ulceration seen. The scope was withdrawn with no immediate  complications.     IMPRESSION:  1. Mild acid reflux. 2.  Mild gastritis.     PLAN:  1. Continue PPI. 2.  Colonoscopy will be scheduled tomorrow to complete GI evaluation.           Mandy Lindo M.D. Objective:   Vitals: /61   Pulse 83   Temp 98.3 °F (36.8 °C) (Oral)   Resp 16   Ht 5' 7\" (1.702 m)   Wt 279 lb (126.6 kg)   SpO2 96%   BMI 43.70 kg/m²     Intake/Output Summary (Last 24 hours) at 11/19/2020 1744  Last data filed at 11/19/2020 1612  Gross per 24 hour   Intake 370 ml   Output 1950 ml   Net -1580 ml     General appearance: alert and cooperative with exam  Lungs: clear to auscultation bilaterally  Heart: regular rate and rhythm, S1, S2 normal, no murmur, click, rub or gallop  Abdomen: soft, non-tender; bowel sounds normal; no masses,  no organomegaly morbid obese  Extremities: extremities normal, atraumatic, no cyanosis or edema    Assessment and Plan:   1. COVID-19 this year of the vent now on room air stable. 2. Lower GI bleed rectal tube sigmoidoscopy showed rectal ulcer plan to repeat colonoscopy to evaluate  3. Melena rule out peptic ulcer disease erosive gastritis  4. Morbid obese  5. His alcohol use in the past not drinking since her hospitalizations  6. EGD and colonoscopy finding discussed with patient. 7. Patient with currently with deep vein thrombosis on anticoagulation will watch closely for GI bleed.    8. Recommend the patient to be on Metamucil triggered by multiple placated less trauma to the during defecation      Follow up in GI Clinic after

## 2020-11-19 NOTE — DISCHARGE SUMMARY
[facility]0 [address] SUMMARY  Pt Name: Zaid Calabrese  MRN: [id number]  YOB: 1970  Primary Care Physician: No primary care provider on file. Admit date:  11/7/2020  4:57 PM  Discharge date:  No discharge date for patient encounter. Disposition: skilled nursing facility     Admitting Diagnosis:   1. Perirectal abscess      Discharge Diagnosis:   Patient Active Problem List   Diagnosis Code    History of atrial fibrillation Z86.79    BPH (benign prostatic hyperplasia) N40.0    Carpal tunnel syndrome on right G56.01    Chronic gout M1A. 9XX0    DM2 (diabetes mellitus, type 2) ([facility] [address] E11.9    Heart failure with preserved ejection fraction (HCC) I50.30    History of alcohol abuse F10.11    History of osteomyelitis Z87.39    Hypogonadism, male E29.1    Major depression F32.9    Morbid obesity ([facility]) E66.01    DURAN (nonalcoholic steatohepatitis) K75.81    KIARA treated with BiPAP G47.33    Vitamin D deficiency E55.9    Normocytic anemia D64.9    Physical deconditioning R53.81    Mood disorder ([facility]) F39    Hallucinations R44.3    GERD (gastroesophageal reflux disease) K21.9    Wound of buttock S31.809A    Primary osteoarthritis of left hip M16.12    Acute on chronic anemia D64.9    Dysphagia R13.10    Hypertension, essential I10    Dyslipidemia E78.5    Aortic stenosis, mild to moderate I35.0    Perirectal abscess K61.1     Discharge condition:  guarded  Consultants:  ID and surgery  Procedures/Diagnostic Test:  NA  Hospital Course: Melony Cooks originally presented to the hospital on 11/7/2020  4:57 PM     \"50 y. o. male with multiple co-morbidities, with a prolonged hospitalization with covid pna, and multiple issues, discharged on 11/7 to [facility] presented to [address] [place] [address] with rectal bleeding. Patient had similar issues while admitted but at the [facility], patient was having rectal bleed, drainage and pain in the rectum.  Per ER, pt has swelling in the area, °C). His blood pressure is 120/71 and his pulse is 82. His respiration is 18 and oxygen saturation is 93%.    General appearance - alert, chronically ill appearing  Chest - unlabored respirations on bipap at time of visit  Heart - normal rate and regular rhythm  Abdomen - soft, incisional tenderness only, bowel sounds present  Neurological - alert, oriented, normal speech, no focal findings or movement disorder noted, global weakness  Musculoskeletal - full range of motion without pain  Extremities - peripheral pulses normal, no pedal edema, no clubbing or cyanosis  Incision: healing well, no drainage  LABS     Recent Labs     11/19/20  0358   WBC 5.7   HGB 11.0*   HCT 36.2*         K 3.5      CO2 29   BUN 4*   CREATININE 0.8     DISCHARGE INSTRUCTIONS   Discharge Medications:      Medication List      START taking these medications    apixaban 5 MG Tabs tablet  Commonly known as:  ELIQUIS  Take 10mg by mouth two times daily for 6 days followed by 5mg by mouth two times daily  Notes to patient:  Take 10mg by mouth two times daily for six days then starting 11/25/20 take 5mg by mouth two times daily      polyethylene glycol 17 g packet  Commonly known as:  GLYCOLAX  Take 17 g by mouth daily as needed for Constipation  Replaces:  polyethylene glycol 17 GM/SCOOP powder        CHANGE how you take these medications    allopurinol 300 MG tablet  Commonly known as:  ZYLOPRIM  Take 1 tablet by mouth daily  What changed:  how much to take        CONTINUE taking these medications    ABILIFY PO     acetaminophen 325 MG tablet  Commonly known as:  TYLENOL     albuterol sulfate  (90 Base) MCG/ACT inhaler  Commonly known as:  Proventil HFA  Inhale 2 puffs into the lungs every 6 hours as needed for Wheezing     Aspercreme 10 % Lotn  Generic drug:  Trolamine Salicylate  Apply topically as needed for Pain     atorvastatin 40 MG tablet  Commonly known as:  LIPITOR     benzocaine 7.5 % oral gel  Commonly known as: BABY ORAJEL     busPIRone 10 MG tablet  Commonly known as:  BUSPAR     CELEXA PO     CHOLECALCIFEROL PO     ferrous sulfate 325 (65 Fe) MG tablet  Commonly known as:  IRON 325  Take 1 tablet by mouth 2 times daily (with meals)     folic acid 1 MG tablet  Commonly known as:  FOLVITE     FreeStyle Oxyrane UK  Patient needs all supplies for qd testing. DX: 250.00     furosemide 40 MG tablet  Commonly known as:  LASIX     gabapentin 400 MG capsule  Commonly known as:  NEURONTIN  Take 1 capsule by mouth 2 times daily for 180 days. glycopyrrolate-formoterol 9-4.8 MCG/ACT Aero  Commonly known as:  BEVESPI  Inhale 2 puffs into the lungs 2 times daily     guaiFENesin 100 MG/5ML syrup  Commonly known as:  ROBITUSSIN     hydrALAZINE 50 MG tablet  Commonly known as:  APRESOLINE     insulin glargine 100 UNIT/ML injection vial  Commonly known as:  LANTUS     insulin lispro 100 UNIT/ML injection vial  Commonly known as:  HUMALOG     Janumet  MG per tablet  Generic drug:  sitaGLIPtan-metFORMIN     lidocaine 5 % ointment  Commonly known as:  XYLOCAINE  Apply topically as needed to painful areas on lower back, hips, knees, and feet     MAXORB EX     modafinil 100 MG tablet  Commonly known as:  PROVIGIL  Take 1 tablet by mouth daily for 30 days. TAKE IN THE MORNING.      ondansetron 4 MG tablet  Commonly known as:  ZOFRAN     pantoprazole 40 MG tablet  Commonly known as:  PROTONIX  Take 1 tablet by mouth daily     Pinxav Oint     SOBIA-BID PROBIOTIC PO     senna 8.6 MG tablet  Commonly known as:  SENOKOT     tamsulosin 0.4 MG capsule  Commonly known as:  FLOMAX     therapeutic multivitamin-minerals tablet     vitamin C 500 MG tablet  Commonly known as:  ASCORBIC ACID  Take 2 tablets by mouth daily        STOP taking these medications    aspirin 81 MG chewable tablet     polyethylene glycol 17 GM/SCOOP powder  Commonly known as:  GLYCOLAX  Replaced by:  polyethylene glycol 17 g packet           Where to Get Your Medications      You can get these medications from any pharmacy    Bring a paper prescription for each of these medications  · apixaban 5 MG Tabs tablet  · polyethylene glycol 17 g packet       Diet: diet as tolerated  Activity:work with therapy to increase strength   Follow-up:  in 1 weeks with  PCP   Time Spent for discharge: 30 minutes    Electronically signed by Alverto Steele MD on 11/19/2020 at 11:32 AM

## 2020-11-20 ENCOUNTER — OUTSIDE SERVICES (OUTPATIENT)
Dept: FAMILY MEDICINE CLINIC | Age: 50
End: 2020-11-20
Payer: MEDICARE

## 2020-11-20 VITALS
RESPIRATION RATE: 20 BRPM | DIASTOLIC BLOOD PRESSURE: 89 MMHG | HEIGHT: 68 IN | WEIGHT: 275 LBS | BODY MASS INDEX: 41.68 KG/M2 | TEMPERATURE: 97.1 F | HEART RATE: 74 BPM | SYSTOLIC BLOOD PRESSURE: 137 MMHG

## 2020-11-20 PROCEDURE — 99305 1ST NF CARE MODERATE MDM 35: CPT | Performed by: FAMILY MEDICINE

## 2020-11-20 NOTE — PROGRESS NOTES
H&P (Admission H&P at [facility])      NAME: Brittny Alejandre  DATE: 20  ROOM #: [id number]  CODE STATUS: FULL CODE  REASON FOR ADMISSION: Weakness after prolonged admission  : 1970  ADMISSION DATE: 2020  SKILLED PATIENT: Yes    History obtained from chart review, the patient and nursing staff. SUBJECTIVE:  HPI: Brittny Alejandre is a 44 y.o. male. Pt seen and examined at bedside.     -Patient admitted to [facility] from  to  for issues below related to perirectal abscess  D/c summary:  \"50 y. o. male with multiple co-morbidities, with a prolonged hospitalization with covid pna, and multiple issues, discharged on  to [facility] presented to [address] [place] [address] with rectal bleeding. Patient had similar issues while admitted but at the [facility], patient was having rectal bleed, drainage and pain in the rectum. Per ER, pt has swelling in the area, discharge and redness. Case was discussed with general surgery who recommend admission and consult to surgery. Patients labs were not concerning and actually better than when he was discharged. His hgb is improved as well. Pt was given rocephin and flagyl in the ER, and admitted to the floor with consults to Surgery        During his admission he was worked up and found to have a rectal ulcer from prolonged rectal tube, this was his bleeding source and improved with non operative management.      1. Perirectal ulceration with bleeding: CT abdomen shows colitis. General surgery consulted, no surgical intervention needed.  Infectious disease consulted. Sonja Interiano consulted, EGD on .  Colonoscopy showed healing perianal ulceration and focal colitis in the ascending colon.  Antibiotics stopped on .  Wound and ostomy consulted.  Continue zinc oxide ointment. 2. Acute LLE DVT: Likely secondary to holding anticoagulation due to bleeding from perirectal ulceration.  Patient started on heparin drip, transition to [address] [place] [address] on . . Will be discharged Elliquis 10mg BID for a week then 5mg BID  3. Hypokalemia: Potassium replaced as needed, at time of discharge was stable and wnl   4. Chronic respiratory failure, OAC: BiPAP at night with naps. 5. Urinary retention chronic: mason from last admission.  Urology follow-up. 6. IDDM: Continue insulin regimen stable during admission   7. Anemia, chronic: Globin stable around 12.  8. Status post COVID-19 pneumonia and prolonged hospital stay with intubation  Physical deconditioning: Physical therapy and Occupational Therapy. \"        Prior to above admission, pt was at [address] [place] [address] for a prolonged period of time related to [facility]        Patient admitted to [address] [place] [address] from 9/23 to 87/1 for complications related to COVID-19. D/c summary: \"Initial H and P and Hospital course:-  Mr. Traci Juarez is a  38-year-old morbidly obese black male lifetime non-smoker. Patient has a history of morbid obesity associated with type 2 diabetes mellitus, prior alcohol abuse, hyperlipidemia, hypertension, hypogonadism, nonalcoholic steatohepatitis, obstructive sleep apnea, and gout. Patient was hospitalized 9/6/2020 through 9/15/2020 with hypoxemic respiratory failure secondary to COVID-19 associated with diffuse bilateral infiltrates. At that time, patient received Decadron, remdesivir, and danazol. During hospitalization, he had issues with atrial fibrillation. His insulin therapy required adjustment. He was discharged home on subcutaneous Lovenox. Patient returned back to the emergency room on 9/23/2020 with increasing SOB and progressive hypoxia. CXR showed diffuse infiltrates. Deteriorated and required intubation. Patient underwent bronchoscopy on 9/27/2020 which demonstrated no mucus production. Patient extubated 10/2/2020. Patient reintubated for progressive respiratory failure with progressive obtundation on 10/6/2020. This was associated with acute oliguric renal failure.   Patient was intubated 10/6/2020, and underwent volume resuscitation. Fractional excretion of sodium returned less than 1 which was consistent with prerenal azotemia. After volume resuscitation, patient demonstrated that he was hemoconcentrated with a drop of 2 g in the hemoglobin. With volume resuscitation, urine output did improve. After patient was intubated on 10/6/2020, he underwent pulmonary lavage which showed MRSA on the PCR but no other organism. However, patient was found to have greater than 200 white blood cells in the urine. Patient was treated with Zosyn and doxycycline. Previously, patient had received vancomycin and developed fever while on vancomycin. Therefore vancomycin was not continued. Sputum subsequently grew staph aureus. Zosyn was discontinued but doxycycline continued on 10/14/2020. Patient did well with spontaneous breathing trial and was extubated 10/15/2020.     10/21: Weaned to room air. Respiratory status remained stable. Spiked fever this morning at 100.4 °F.  Possibly due to rectal wound due to Flexi-Seal.  Restarted on IV Zosyn, ID reconsulted. Patient would be a good candidate for SNF versus LTACH at discharge     10/22: Doing well on room air today. Afebrile. Continue Zosyn for 5 to 7 days. [place] evaluation. Continue zinc oxide for rectal wound. Blood pressure is elevated today, IV hydralazine for BP greater than 160     10/23: Recurrent fevers up to 103.0 °F today. Lactic acid ordered and normal.  Restarted vancomycin per ID. Possible PICC line infection versus worsening pneumonia. IV fluid bolus given. Patient tachypneic and tachycardic this morning, improved post fluid administration. 10/24: Patient lethargic and only responds to name. He is unable to stay awake for a complete exam.  He was placed on BiPAP overnight. He did not have a fever overnight. Patient was restarted on Vancomycin yesterday per ID.   Patient had mild tachypnea early in the night, however this resolved by morning. The Patient was normocardic throughout the night. Following ID for advice and recommendations. 10/26: Patient more awake today than previous. He is able to answer questions, however limited due to constant use of BiPAP. He was placed in ICU yesterday due to continued blood in his stools as well as   Hypotension. He required 1 unite PRBC this morning. He was not intubated overnight. A sigmoidoscopy was preformed and showed a large perianal ulcer extending into the anal canal.  GI stated there was nothing they could do to prevent re-bleeding at this point. 10/27: Patient able to answer questions better this morning, however he is still tired/lethargic and cannot stay awake for the entire exam.  He denies chest pain. He points to his mask and states that it is painful and itchy. The Night time nurse reports that he had 3 episodes of passing large clots per his rectum. He has been grabbing at the BiPAP mask and has been succesful in taking it off occasionally, but he desatruates and needs it placed back on. He hit is toe against the bedrail and ripped off his right toenail. The nurse kept it in a sample cup. His HgB is at 7.2 (From 7.6 the night prior). Will recheck HgB later today and transfuse if necsessary. GI states only conservative management can be done for the perianal ulcer at this time. They also state that the patient is not a good candidate for PEG tube placement unless he is off of BiPAP. The patient's father does not want to place a tracheostomy at this time. Patient till on TPN. 10/28: Patient able to answer some questions today. Still lethargic on exam.  He is still removing his BiPAP frequently, although his saturation drops below 90 when it is not on. There were no reported episodes of clots passed overnight. He had a BM that was mostly green in consistency. He required 1 unit of PRBC overnight, HgB currently stable at 7.9.   Pre Cert was denied for [place], starting appeal process. Antibiotics to continue for another week. Nothing else can be done at this time except transfusions and antibiotics. POA states no trach, which means that he will be unable to get a PEG tube. 10/29: Patient asleep for most of exam.  No episodes of Rectal Bleeding seen. Nurses state he is still trying to take the BiPAP off, but quickly desaturates. No units of PRBC had to be transfused. HgB increased to 8.4. Day 2 of appeal for Faye. No current change in treatment plans. 10/30: Patient asleep for most of exam, however he is able saturating well without the BiPAP. Will still need BiPAP at night for KIARA. No further transfusions of clots passed overnight. HgB stable. Day 3 of appeal for [place]aye. Since off of BiPAP, Speech Therapy has been consulted to assess dysphagia and ability to eat PO. A barium swallow shows no aspiration. ST states a 2 week long term goal to return patient to functional eating status. Will continue antibiotics. 10/31: Overnight event reported, Patient reported to have fallen out of bed around 0620. The fall was unwitnessed and he was found on the ground face down. He stated no loss of consciousness, and denied hitting his head. The PICC line was pulled out during the incident. He was helped back into bed, and reported no headaches, neck pain, or back pain. There are were no visible injuries. Patient states he is feeling well this morning and is saturating well without the BiPAP. He states that he \"tripped\" out of bed in the morning, but did not hit his head. He denies headaches, chest pain, shortness of breath, nausea, vomiting, diarrhea, neck pain, or back pain. He is able to eat solid food, and TPN is now on hold due to no IV access. Will order an IV line to be placed, continue antibiotics. 11/1 - 11/6:  Physical deconditioning. Tried removing Dimas, patient started retaining, creatinine briefly spiked up.   Resume tamsulosin, and reinserted Mason catheter. Patient discharged with a Mason catheter to follow-up with urology in 2 weeks. Patient had great success with modafinil. Continue to hold all sedative medications. Patient did not have documented episodes of A. fib on EKG\"      Since admission:  Doing better  No further rectal bleeding  Denies sig rectal pain  No fevers  Is off ATB    New DVT LLE  On eliquis now  Mild calf pain/swelling. -DM2 on Janumet as well as basaglar and SSI with humalog. Since admission sugars have been . No lows. -HTN: on hydralazine. BP have been <140/90. No CP/SOB     -HF with preserved EF/AS/hx of afib: used to be on CCB and eliquis. Both stopped during above 1st hospitalization. [address] stopped d/t recurrent rectal bleeding. Back on Eliquis, though, for LLE DVT as above. No evidence for afib during above admission. AS noted on echo this admission. Denies CP/sOB. -Depression/mood disorder: follows with Anita. On celexa, buspar and abilify. Has occ hallucinations. None at present or in a while. Denies SI/HI. Feels moods are better. -GERD: on Proptonix. Helps with his GERD. Denies sxs or dysphagia     -BPH: on flomax, mason in place. Failed voiding trial prior to d/c. Will need OP uro consult. -Vit d def: on supplementation    -Gout: on allopurinol. Weaned off steroids and percocet. Doing well. No major joint pain or swelling    -L buttocks wound: wound team will be managing, dressed today. Allergies and Medications were reviewed through the [facility] EMR. All medications reviewed and reconciled, including OTC and herbal medications.        Patient Active Problem List    Diagnosis Date Noted    Perirectal abscess 11/07/2020    Hypertension, essential     Dyslipidemia     Aortic stenosis, mild to moderate     Acute on chronic anemia     Dysphagia     Primary osteoarthritis of left hip     Normocytic anemia 02/09/2020    Physical deconditioning 02/09/2020  Hallucinations 02/09/2020    Wound of buttock 02/09/2020    History of atrial fibrillation     BPH (benign prostatic hyperplasia)     Carpal tunnel syndrome on right      rt hand      Chronic gout     DM2 (diabetes mellitus, type 2) (HCC)     Heart failure with preserved ejection fraction (HCC)     History of alcohol abuse     History of osteomyelitis      Hx of R foot wound, osteomyelitis July 2018 requiring surgery and IV Vanco      Hypogonadism, male     Major depression     Morbid obesity ([address]     DURAN (nonalcoholic steatohepatitis)     KIARA treated with BiPAP     Vitamin D deficiency     Mood disorder (HCC)     GERD (gastroesophageal reflux disease)        Past Medical History:   Diagnosis Date    Aortic stenosis, mild to moderate     BPH (benign prostatic hyperplasia)     Carpal tunnel syndrome on right     rt hand    Chronic gout     DM2 (diabetes mellitus, type 2) (HCC)     Dyslipidemia     GERD (gastroesophageal reflux disease)     Heart failure with preserved ejection fraction (HCC)     History of alcohol abuse     History of atrial fibrillation     History of osteomyelitis     Hx of R foot wound, osteomyelitis July 2018 requiring surgery and IV Vanco    Hypertension, essential     Hypogonadism, male     Major depression     Mood disorder ([address]     Morbid obesity ([address]     DURAN (nonalcoholic steatohepatitis)     KIARA treated with BiPAP     Vitamin D deficiency        Past Surgical History:   Procedure Laterality Date    COLONOSCOPY N/A 11/15/2020    COLONOSCOPY DIAGNOSTIC performed by Celeste Ruiz MD at [facility]    FOOT SURGERY Right     2018, R foot osteomyelitis    HIP SURGERY Left 6/29/2020    bilateral hip steroid injection, Left HIP FIRST performed by Bev Bhatia MD at [facility] N/A 10/26/2020    SIGMOIDOSCOPY DIAGNOSTIC FLEXIBLE performed by Alejandro Templeton MD at [facility]      as a baby  UPPER GASTROINTESTINAL ENDOSCOPY N/A 11/14/2020    EGD DIAGNOSTIC ONLY performed by Umair Perez MD at [facility]       Allergies   Allergen Reactions    Penicillins      Tolerated Zosyn 9/24/2020       Social History     Tobacco Use    Smoking status: Never Smoker    Smokeless tobacco: Never Used   Substance Use Topics    Alcohol use: Not Currently     Comment: hx of abuse        Family History   Problem Relation Age of Onset    Heart Disease Mother         MI    Cancer Paternal Aunt         lung         I have reviewed the patient's past medical history, past surgical history, allergies, medications, social and family history and I have made updates where appropriate.       Review of Systems  Positive responses are highlighted in bold    Constitutional:  Fever, Chills, Night Sweats, Fatigue, Unexpected changes in weight  Eyes:  Eye discharge, Eye pain, Eye redness, Visual disturbances   HENT:  Ear pain, Tinnitus, Nosebleeds, Trouble swallowing, Hearing loss, Sore throat  Cardiovascular:  Chest Pain, Palpitations, Orthopnea, Paroxysmal Nocturnal Dyspnea  Respiratory:  Cough, Wheezing, Shortness of breath, Chest tightness, Apnea  Gastrointestinal:  Nausea, Vomiting, Diarrhea, Constipation, Heartburn, Blood in stool  Genitourinary:  Difficulty or painful urination, Flank pain, Change in frequency, Urgency  Skin:  Color change, Rash, Itching, Wound  Psychiatric:  Hallucinations, Anxiety, Depression, Suicidal ideation  Hematological:  Enlarged glands, Easy bleeding, Easily bruising  Musculoskeletal:  Joint pain, Back pain, Gait problems, Joint swelling, Myalgias  Neurological:  Dizziness, Headaches, Presyncope, Numbness, Seizures, Tremors  Allergy:  Environmental allergies, Food allergies  Endocrine:  Heat Intolerance, Cold Intolerance, Polydipsia, Polyphagia, Polyuria      PHYSICAL EXAM:  Vitals:    11/20/20 1700   BP: 137/89   Pulse: 74   Resp: 20   Temp: 97.1 °F (36.2 °C)   Weight: 275 lb (124.7 kg) Height: 5' 8\" (1.727 m)     Body mass index is 41.81 kg/m². Pain Score: 2(Chronic joint and back pain)    VS Reviewed  General Appearance: well developed and well- nourished, in no acute distress  Head: normocephalic and atraumatic  Eyes: pupils equal, round, and reactive to light, conjunctivae and eye lids without erythema  ENT: external ear and ear canal normal bilaterally, nose without deformity, nasal mucosa and turbinates normal without polyps, oropharynx normal, dentition is normal for age, no lip or gum lesions noted  Neck: supple and non-tender without mass, no thyromegaly or thyroid nodules, no cervical lymphadenopathy  Pulmonary/Chest: clear to auscultation bilaterally- no wheezes, rales or rhonchi, normal air movement, no respiratory distress or retractions  Cardiovascular: normal rate, regular rhythm, normal S1 and S2, no murmurs, rubs, clicks, or gallops, distal pulses intact  Abdomen: soft, non-tender, non-distended, bowel sounds physiologic,  no rebound or guarding, no masses or hernias noted. Liver and spleen without enlargement. Extremities: no cyanosis, clubbing or edema of the lower extremities  Musculoskeletal: No joint swelling or gross deformity   Neuro:  Alert, 2+ patellar reflexes b/l,  normal speech, no focal findings or movement disorder noted  Psych:  Normal affect without evidence of depression or anxiety, insight and judgement are appropriate, memory appears intact  Skin: warm and dry, no rash or erythema. Buttocks wound dressed.    Lymph:  No cervical, auricular or supraclavicular lymph nodes palpated      LABS/IMAGING    Recent Labs     11/19/20  0358 11/18/20  0351 11/17/20  0339   WBC 5.7 6.5 6.9   HGB 11.0* 11.0* 11.0*   HCT 36.2* 37.0* 35.7*   MCV 93.8 94.6* 93.7    344 358     Lab Results   Component Value Date     11/19/2020    K 3.5 11/19/2020     11/19/2020    CO2 29 11/19/2020    BUN 4 11/19/2020    CREATININE 0.8 11/19/2020    GLUCOSE 137 11/19/2020 inherent in voice recognition technology. **         Final report electronically signed by Dr. Ashia Leal on 11/16/2020 1:41 PM        Narrative    PROCEDURE: XR CHEST (2 VW)         CLINICAL INFORMATION: SOB.         COMPARISON: 11/1/2020.         TECHNIQUE: PA and lateral views the chest.         FINDINGS:    There are low lung volumes with resultant bronchovascular crowding. There is linear/bandlike opacity at the right midlung. The cardiac mediastinal silhouette is normal. Visualized osseous structures appear grossly intact.                   Impression    Low lung volumes with probable subsegmental atelectasis at the right midlung.                        **This report has been created using voice recognition software. It may contain minor errors which are inherent in voice recognition technology. **         Final report electronically signed by Dr. Sariah Campbell MD on 11/13/2020 11:30 AM        Narrative    PROCEDURE: CT ABDOMEN PELVIS W IV CONTRAST         CLINICAL INFORMATION: 61-year-old male with rectal bleeding. Possible fistula. Abdominal pain .         COMPARISON: CT scan 10/20/2020.         TECHNIQUE: 5 mm axial CT images were obtained through the abdomen and pelvis after the administration of intravenous and oral contrast. Coronal and sagittal reconstructions were obtained.         All CT scans at this facility use dose modulation, iterative reconstruction, and/or weight-based dosing when appropriate to reduce radiation dose to as low as reasonably achievable.         FINDINGS:    Atelectasis is noted at the bilateral lung bases. On axial image #1 there is a 3 mm pulmonary nodule in the anterior right lung. There is no pleural effusion.         The base of the heart is within acceptable limits.         The liver is hypoattenuated. This may be due to fatty infiltration.         The gallbladder, pancreas, adrenal glands and spleen are within normal limits.  There are some splenule seen in the left upper quadrant.         The kidneys are symmetric in size, shape and degree of enhancement. There is no hydronephrosis.         There is no evidence of a small bowel obstruction.         There is circumferential wall thickening in the region of the rectum.         There is a Dimas catheter within the lumen of the urinary bladder.         The aorta and IVC are normal in caliber.         Extensive calcifications are again seen in the fascial planes throughout the abdomen and pelvis. This is most notable in the right gluteal region. This finding is similar to the previous exam.         Haziness is seen throughout the superficial subcutaneous soft tissues of the ventral abdominal wall. This may be on the basis of previous injections.         The osseous structures are intact. There are degenerative changes in the spine. No acute fractures.              Impression         1. Extensive thickening is seen in the region of the rectum. This is likely on the basis of colitis. 2. Persistence of extensive calcifications in the fascial planes throughout the abdomen and pelvis. This could be on the basis of dermatomyositis. 3. Hepatic steatosis. 4. There is a 3 mm pulmonary nodule near the right lung base. Follow-up is recommended in 6-12 months to assess for stability.              **This report has been created using voice recognition software. It may contain minor errors which are inherent in voice recognition technology. **         Final report electronically signed by Dr Alexandro Robb on 11/7/2020 8:17 PM        Narrative    PROCEDURE: XR CHEST PORTABLE         CLINICAL INFORMATION: sob. History of hypertension. COMPARISON: Chest x-ray 10/23/2020. TECHNIQUE: AP supine view of the chest.         FINDINGS:              The heart size is normal.The mediastinum is not widened. The previously visualized PICC line has been removed. There are persistent mild bilateral patchy pulmonary opacities.  There are no pleural effusions noted on this supine film. The pulmonary    vascularity is normal.                   Impression    Persistent mild bilateral patchy pulmonary opacities. **This report has been created using voice recognition software. It may contain minor errors which are inherent in voice recognition technology. **         Final report electronically signed by Dr. Jose Matias on 11/1/2020 1:48 PM        ASSESSMENT & PLAN  1. Perirectal ulcer    Healing per surgery team  Should con't to heal  Monitor for s/s bleeding  F/u GI and surgery    2. Perirectal abscess    Improved  Off ATB  Monitor     3. Acute lower GI bleeding    Resolved   H/H stable    4. Acute deep vein thrombosis (DVT) of calf muscle vein of left lower extremity (HCC)    New DVT  eliquis x 6 months    5. Physical deconditioning    con't PT/OT    6. History of 2019 novel coronavirus disease (COVID-19)    Prolonged issues with this  Dx back in August 2020  Very weak  con't O2, wean as able  PT/OT    7. Acute respiratory failure with hypoxia and hypercapnia ([facility])    As per # 6  Prn albuterol  con't Bevespi    8. KIARA treated with BiPAP    con't Bipap    9. KRISTY (acute kidney injury) ([address]    Improved  Monitor labs. 10. Acute on chronic anemia    Sig improved  H/H close to normal    11. Dysphagia, unspecified type    Mod diet  ST consult    12. Type 2 diabetes mellitus with diabetic polyneuropathy, with long-term current use of insulin (HCC)    Stable  con't Janumet as well as basaglar and SSI with humalog    13. Hypertension, essential    So far at goal  con't hydralazine    14. History of atrial fibrillation    No recurrence   Off rate control  Back on [address] for DVT     15. Chronic heart failure with preserved ejection fraction (HCC)    Daily wts  BP control  lasix    16. Aortic stenosis, mild to moderate    Mild to moderate  F/u cardio as OP    17.  Recurrent major depressive disorder, in full remission ([facility]    Stable  con't celexa, buspar and abilify    Antipsychotic/Antianxiety/Hypnotic/Psychotropic/Sedation/Antidepressant medications are continued at this time because discontinuation may result in adverse effects or return of concerning behaviors/symptoms. 18. Mood disorder ([facility]    As per # 58    36. Hallucinations    As per # 77    99. Gastroesophageal reflux disease, unspecified whether esophagitis present    Stable  Con't protonix    21. Benign prostatic hyperplasia with urinary obstruction    con't mason  con't flomax  uro consult    22. Mason catheter in place on admission      23. Vitamin D deficiency    con't supplemenation    24. History of gout    con't allopurinol, doing much better      Disposition: FULL CODE.  Con't PT/OT      Future Appointments   Date Time Provider Anamika Solorio   12/10/2020  9:20 AM Jackie Puri MD [facility]       Electronically signed by Cisco Wilson DO on 11/20/2020 at 5:21 PM

## 2020-12-02 ENCOUNTER — OUTSIDE SERVICES (OUTPATIENT)
Dept: FAMILY MEDICINE CLINIC | Age: 50
End: 2020-12-02
Payer: MEDICARE

## 2020-12-02 VITALS
SYSTOLIC BLOOD PRESSURE: 122 MMHG | DIASTOLIC BLOOD PRESSURE: 83 MMHG | TEMPERATURE: 98 F | RESPIRATION RATE: 18 BRPM | BODY MASS INDEX: 37.89 KG/M2 | WEIGHT: 250 LBS | HEIGHT: 68 IN | HEART RATE: 83 BPM

## 2020-12-02 PROCEDURE — 99308 SBSQ NF CARE LOW MDM 20: CPT | Performed by: FAMILY MEDICINE

## 2020-12-03 ENCOUNTER — OUTSIDE SERVICES (OUTPATIENT)
Dept: FAMILY MEDICINE CLINIC | Age: 50
End: 2020-12-03
Payer: MEDICARE

## 2020-12-03 PROCEDURE — 99309 SBSQ NF CARE MODERATE MDM 30: CPT | Performed by: NURSE PRACTITIONER

## 2020-12-03 NOTE — PROGRESS NOTES
NAME: Geovanna Hinds  DATE: 12-3-2020  ROOM #: 33-2  CODE STATUS: FULL  REASON FOR VISIT: Acute for left leg/foot pain   : [birth year]  Skilled Patient?: No    HPI: Pt seen and examined at bedside. History is partially obtained from chart review. Patient just getting back into bed with therapy help. Started to have complain of aching pain in his left foot last evening. XRAY showed degenerative changes and possible stress injury to mid/distal fibula. Pain is on top of foot and also in back of leg to just above calf. He notes increased swelling in leg when he has leg in dependent position. Also states that it hurts to stand on, but gets worse when walked on. He also has aching pain in his foot when just lying in bed. Pain management clinical notes cannot be scanned this visit, however in summary: Plan is to proceed with bilateral hip steroid injection, starting with the left hip. He is to be off blood thinners 5 days prior to injections. Continue with Prednisone, Percocet, and Lidocaine ointment for pain. PMHx: RA, DM2, AFib, HTN, leukocytosis, CP, abd pain, septic arthritis, hip pain, frontal lobe mass, morbid obesity, long-term opiate analgesic use, hepatic steatosis, esophagitis, neuropathy, weakness, pleurisy, auditory hallucinations.    PSHx: Tracheostomy, cardiac catheterization, Appendectomy, Foot surgery.    Social Hx: No tobacco or EtOH. Denies drug use.    FamHx: CAD    Allergies, medications, labs and radiology reports were reviewed through chart review. Patients past medical, surgical, social and family history have been reviewed and updates were made where appropriate.     Review of Systems  Positive responses are indicated with +    Constitutional:  Fever, Chills, Fatigue, Unexpected changes in weight  Eyes:  Eye discharge, Eye pain, Eye redness, Visual disturbances   HENT:  Ear pain, Tinnitus, Nosebleeds, Trouble swallowing  Cardiovascular:  Chest Pain, Palpitations  Respiratory:  Cough, Wheezing, Shortness of breath, Chest tightness, Apnea  Gastrointestinal:  Nausea, Vomiting, Diarrhea, Constipation, Heartburn, Blood in stool  Genitourinary:  Difficulty or painful urination, Flank pain, Change in frequency, Urgency  Skin:  Color change, Rash, Itching, Wound  Psychiatric:  Hallucinations, Anxiety, Depression, Suicidal ideation  Hematological:  Enlarged glands, Easy bleeding, Easily bruising  Musculoskeletal:  Joint pain, Back pain, +Gait problems, Joint swelling, Myalgias  Neurological:  Dizziness, Headaches, Presyncope, Numbness, Seizures, Tremors  Allergy:  Environmental allergies, Food allergies  Endocrine:  Heat Intolerance, Cold Intolerance, Polydipsia, Polyphagia, Polyuria    PHYSICAL EXAM  Vital Signs: T, BP, P, RR, Wt  98, 138/84, 80, 18 247#  General Appearance: Obese, debilitated appearing male, in no acute distress  Head: normocephalic and atraumatic  Eyes: pupils equal, round, and reactive to light, conjunctivae and eye lids without erythema  Pulmonary/Chest: No respiratory distress or retractions. Lung sounds clear. Extremities: no cyanosis, clubbing Mild non pitting edema of left lower extremity. Musculoskeletal: No joint swelling or gross deformity. Neuro: Alert, normal speech, no focal findings or movement disorder noted  Psych: Normal affect without evidence of depression or anxiety, insight and judgement are appropriate, memory appears intact  Skin: warm and dry. Skin is normal color for race, however there is increased warmth over areas of pain on foot and calf. IMAGING:  INTERPRETATION:  Reason for Study: M25.572 PAIN IN LEFT ANKLE AND JOINTS OF LEFT FOOT  Principal Result : Librado López ([id number])  Technician: Vlad Guerrero ([email or web address])  Transcription Technician: LE    ANKLE AP AND LAT 2V  FOOT AP and LAT 2V, LEFT  Results: There is diffuse calcification of the soft tissues. Advanced degenerative  changes are noted of the ankle and midfoot.  There is possible cortical thickening of  the midportion of the lateral fibula. Enthesopathy noted of the calcaneus. Conclusion: Advanced degenerative changes of the ankle and midfoot. Possible stress  injury of the mid to distal fibula. Electronically signed by Ruben Stevens M.D. 12/2/2020 7:40:17 PM EST. ANKLE AP and LAT 2V, LEFT  Results: There is diffuse calcification of the soft tissues. Advanced degenerative  changes are noted of the ankle and midfoot. There is possible cortical thickening of  the midportion of the lateral fibula. Enthesopathy noted of the calcaneus. Conclusion: Advanced degenerative changes of the ankle and midfoot. Possible stress  injury of the mid to distal fibula. Electronically signed by Ruben Stevens M.D. 12/2/2020 7:40:17 PM EST. ASSESSMENT AND PLAN  1. Left leg pain: Dorsal aspect of foot, and posterior leg extending up to just above calf. With mild swelling and a feeling of increased warmth, but without any redness. No fever. No open areas. Patient notes that leg swells significantly when leg is in a dependent position. At this time, will order Venous US of left leg. Patient notes that Tylenol is not controlling pain and he is having increasing difficulty walking on leg. Start short term Norco 5/325 mg po every 6 hr prn. Additionally start Prednisone 40 mg daily x 5 days. Await results of US.      Plan of care reviewed with Dr Francoise Santos, CNP

## 2020-12-15 ENCOUNTER — OFFICE VISIT (OUTPATIENT)
Dept: CARDIOLOGY CLINIC | Age: 50
End: 2020-12-15
Payer: MEDICARE

## 2020-12-15 VITALS
HEIGHT: 68 IN | DIASTOLIC BLOOD PRESSURE: 100 MMHG | SYSTOLIC BLOOD PRESSURE: 140 MMHG | BODY MASS INDEX: 36.95 KG/M2 | HEART RATE: 75 BPM | WEIGHT: 243.8 LBS

## 2020-12-15 PROCEDURE — G8484 FLU IMMUNIZE NO ADMIN: HCPCS | Performed by: INTERNAL MEDICINE

## 2020-12-15 PROCEDURE — 1036F TOBACCO NON-USER: CPT | Performed by: INTERNAL MEDICINE

## 2020-12-15 PROCEDURE — G8417 CALC BMI ABV UP PARAM F/U: HCPCS | Performed by: INTERNAL MEDICINE

## 2020-12-15 PROCEDURE — 1111F DSCHRG MED/CURRENT MED MERGE: CPT | Performed by: INTERNAL MEDICINE

## 2020-12-15 PROCEDURE — G8427 DOCREV CUR MEDS BY ELIG CLIN: HCPCS | Performed by: INTERNAL MEDICINE

## 2020-12-15 PROCEDURE — 99214 OFFICE O/P EST MOD 30 MIN: CPT | Performed by: INTERNAL MEDICINE

## 2020-12-15 PROCEDURE — 3017F COLORECTAL CA SCREEN DOC REV: CPT | Performed by: INTERNAL MEDICINE

## 2020-12-15 RX ORDER — POTASSIUM CHLORIDE 20 MEQ/1
20 TABLET, EXTENDED RELEASE ORAL DAILY
Qty: 30 TABLET | Refills: 2 | Status: SHIPPED | OUTPATIENT
Start: 2020-12-15 | End: 2022-09-06

## 2020-12-15 NOTE — PROGRESS NOTES
Chief Complaint   Patient presents with    Follow-up    Coronary Artery Disease    Hypertension   Originally patient establish cardiologist from [facility]    6 month Follow-up.        Leg edema +1 to +2- chonic    Sob on exertion chronic    Denied  Chest pain, dizziness or palpitations  Non wt gaibn    Hxof  chest wall tenderness    nevere smoked    FHX  Mother had MI at her 52's      Past- 2012 had cp and abn nuc then and did not want cath that time    Patient Active Problem List   Diagnosis    History of atrial fibrillation    BPH (benign prostatic hyperplasia)    Carpal tunnel syndrome on right    Chronic gout    DM2 (diabetes mellitus, type 2) ([address]    Heart failure with preserved ejection fraction ([address]    History of alcohol abuse    History of osteomyelitis    Hypogonadism, male    Major depression    Morbid obesity ([address]    DURAN (nonalcoholic steatohepatitis)    KIARA treated with BiPAP    Vitamin D deficiency    Normocytic anemia    Physical deconditioning    Mood disorder (HCC)    Hallucinations    GERD (gastroesophageal reflux disease)    Wound of buttock    Primary osteoarthritis of left hip    Acute on chronic anemia    Dysphagia    Hypertension, essential    Dyslipidemia    Aortic stenosis, mild to moderate    Perirectal abscess       Past Surgical History:   Procedure Laterality Date    COLONOSCOPY N/A 11/15/2020    COLONOSCOPY DIAGNOSTIC performed by [facility]ley [facility] at [facility] Right     2018, R foot osteomyelitis    HIP SURGERY Left 6/29/2020    bilateral hip steroid injection, Left HIP FIRST performed by Tristan Pandya MD at [facility] N/A 10/26/2020    SIGMOIDOSCOPY DIAGNOSTIC FLEXIBLE performed by Alem Delcid MD at [facility]      as a baby    UPPER GASTROINTESTINAL ENDOSCOPY N/A 11/14/2020    EGD DIAGNOSTIC ONLY [facility]       Allergies   Allergen Reactions    Penicillins      Tolerated Zosyn 9/24/2020        Family History   Problem Relation Age of Onset    Heart Disease Mother         MI    Cancer Paternal Aunt         lung        Social History     Socioeconomic History    Marital status:      Spouse name: Not on file    Number of children: Not on file    Years of education: Not on file    Highest education level: Not on file   Occupational History    Not on file   Social Needs    Financial resource strain: Not on file    Food insecurity     Worry: Not on file     Inability: Not on file   [employer] needs     Medical: Not on file     Non-medical: Not on file   Tobacco Use    Smoking status: Never Smoker    Smokeless tobacco: Never Used   Substance and Sexual Activity    Alcohol use: Not Currently     Comment: hx of abuse    Drug use: No    Sexual activity: Not Currently   Lifestyle    Physical activity     Days per week: Not on file     Minutes per session: Not on file    Stress: Not on file   Relationships    Social connections     Talks on phone: Not on file     Gets together: Not on file     Attends [employer] service: Not on file     Active member of club or organization: Not on file     Attends meetings of clubs or organizations: Not on file     Relationship status: Not on file    Intimate partner violence     Fear of current or ex partner: Not on file     Emotionally abused: Not on file     Physically abused: Not on file     Forced sexual activity: Not on file   Other Topics Concern    Not on file   Social History Narrative    Not on file       Current Outpatient Medications   Medication Sig Dispense Refill    potassium chloride (KLOR-CON M) 20 MEQ extended release tablet Take 1 tablet by mouth daily 30 tablet 2    polyethylene glycol (GLYCOLAX) 17 g packet Take 17 g by mouth daily as needed for Constipation 527 g 1    apixaban (ELIQUIS) 5 MG TABS tablet Take 10mg by mouth two times daily for 6 days followed by 5mg by mouth two times daily 60 tablet 1    glycopyrrolate-formoterol (BEVESPI) 9-4.8 MCG/ACT AERO Inhale 2 puffs into the lungs 2 times daily 10.7 g 1    ferrous sulfate (IRON 325) 325 (65 Fe) MG tablet Take 1 tablet by mouth 2 times daily (with meals) 30 tablet 3    albuterol sulfate HFA (PROVENTIL HFA) 108 (90 Base) MCG/ACT inhaler Inhale 2 puffs into the lungs every 6 hours as needed for Wheezing 1 Inhaler 3    gabapentin (NEURONTIN) 400 MG capsule Take 1 capsule by mouth 2 times daily for 180 days. 90 capsule 3    vitamin C (ASCORBIC ACID) 500 MG tablet Take 2 tablets by mouth daily 30 tablet 0    CHOLECALCIFEROL PO Take 2,000 Units by mouth daily      atorvastatin (LIPITOR) 40 MG tablet Take 40 mg by mouth nightly      acetaminophen (TYLENOL) 325 MG tablet Take 650 mg by mouth every 4 hours as needed for Pain      benzocaine (BABY ORAJEL) 7.5 % oral gel Take by mouth 4 times daily as needed for Pain (mouth pain) Take by mouth 3 times daily.       guaiFENesin (ROBITUSSIN) 100 MG/5ML syrup Take 200 mg by mouth every 4 hours as needed for Cough      busPIRone (BUSPAR) 10 MG tablet Take 10 mg by mouth 2 times daily       Trolamine Salicylate (ASPERCREME) 10 % LOTN Apply topically as needed for Pain 1 Bottle 0    pantoprazole (PROTONIX) 40 MG tablet Take 1 tablet by mouth daily 5 tablet 0    allopurinol (ZYLOPRIM) 300 MG tablet Take 1 tablet by mouth daily 90 tablet 0    lidocaine (XYLOCAINE) 5 % ointment Apply topically as needed to painful areas on lower back, hips, knees, and feet 1 Tube 2    hydrALAZINE (APRESOLINE) 50 MG tablet Take 50 mg by mouth 2 times daily       Wound Dressings (MAXORB EX) Apply topically      Multiple Vitamins-Minerals (THERAPEUTIC MULTIVITAMIN-MINERALS) tablet Take 1 tablet by mouth daily      Diaper Rash Products (PINXAV) OINT Apply topically      Probiotic Product (SOBIA-BID PROBIOTIC PO) Take 1 tablet by mouth daily       ondansetron (ZOFRAN) 4 MG tablet Take 4 mg by mouth every 8 hours as needed for Nausea or Vomiting      tamsulosin (FLOMAX) 0.4 MG capsule Take 0.4 mg by mouth nightly       folic acid (FOLVITE) 1 MG tablet Take 1 mg by mouth daily      furosemide (LASIX) 40 MG tablet Take 40 mg by mouth daily      insulin lispro (HUMALOG) 100 UNIT/ML injection vial Inject into the skin 3 times daily (before meals) Per scale: 151-200=1 unit, 201-250=2 units, 251-300-3 units, 301-350=4 units, 351-400=5 units.  sitaGLIPtan-metformin (JANUMET)  MG per tablet Take 1 tablet by mouth 2 times daily (with meals)      insulin glargine (LANTUS) 100 UNIT/ML injection vial Inject 10 Units into the skin nightly       senna (SENOKOT) 8.6 MG tablet Take 1 tablet by mouth 2 times daily      ARIPiprazole (ABILIFY PO) Take 15 mg by mouth daily       Citalopram Hydrobromide (CELEXA PO) Take 30 mg by mouth daily From [facility] services       Blood Glucose Monitoring Suppl (FREESTYLE LITE) ARTIE Patient needs all supplies for qd testing. DX: 250.00 1 Device 11     No current facility-administered medications for this visit. Review of Systems -     General ROS: negative  Psychological ROS: negative  Hematological and Lymphatic ROS: No history of blood clots or bleeding disorder. Respiratory ROS: no cough,  or wheezing, the rest see HPI  Cardiovascular ROS: See HPI  Gastrointestinal ROS: negative  Genito-Urinary ROS: no dysuria, trouble voiding, or hematuria  Musculoskeletal ROS: negative  Neurological ROS: no TIA or stroke symptoms  Dermatological ROS: negative      Blood pressure (!) 140/100, pulse 75, height 5' 8\" (1.727 m), weight 243 lb 12.8 oz (110.6 kg).         Physical Examination:    General appearance - alert, well appearing, and in no distress  HEENT- Pink conjunctiva  , Non-icteri sclera,PERRLA  Mental status - alert, oriented to person, place, and time  Neck - supple, no significant adenopathy, no JVD, or carotid bruits  Chest - clear to auscultation, no wheezes, rales or rhonchi, symmetric air entry  Heart - normal rate, regular rhythm, normal S1, S2, no murmurs, rubs, clicks or gallops  Abdomen - soft, nontender, nondistended, no masses or organomegaly  JORDAN- no CVA or flank tenderness, no suprapubic tenderness  Neurological - alert, oriented, normal speech, no focal findings or movement disorder noted  Musculoskeletal/limbs - no joint tenderness, deformity or swelling   - peripheral pulses normal, no pedal edema, no clubbing or cyanosis  Skin - normal coloration and turgor, no rashes, no suspicious skin lesions noted  Psych- appropriate mood and affect    Lab  No results for input(s): CKTOTAL, CKMB, CKMBINDEX, TROPONINI in the last 72 hours.   CBC:   Lab Results   Component Value Date    WBC 5.7 11/19/2020    RBC 3.86 11/19/2020    RBC 4.55 04/15/2012    HGB 11.0 11/19/2020    HCT 36.2 11/19/2020    MCV 93.8 11/19/2020    MCH 28.5 11/19/2020    MCHC 30.4 11/19/2020    RDW 19.9 04/06/2020     11/19/2020    MPV 10.8 11/19/2020     BMP:    Lab Results   Component Value Date     11/19/2020    K 3.5 11/19/2020     11/19/2020    CO2 29 11/19/2020    BUN 4 11/19/2020    LABALBU 3.1 11/08/2020    LABALBU 4.4 01/26/2012    CREATININE 0.8 11/19/2020    CALCIUM 9.2 11/19/2020    GFRAA >60 01/23/2019    LABGLOM >90 11/19/2020    GLUCOSE 137 11/19/2020    GLUCOSE 113 06/24/2018     Hepatic Function Panel:    Lab Results   Component Value Date    ALKPHOS 94 11/08/2020    ALT 14 11/08/2020    AST 22 11/08/2020    PROT 6.1 11/08/2020    BILITOT 0.5 11/08/2020    BILIDIR 0.6 09/24/2020    LABALBU 3.1 11/08/2020    LABALBU 4.4 01/26/2012     Magnesium:    Lab Results   Component Value Date    MG 1.4 11/19/2020     Warfarin PT/INR:  No components found for: PTPATWAR, PTINRWAR  HgBA1c:    Lab Results   Component Value Date    LABA1C 9.9 09/06/2020     FLP:  No results found for: TRIG, HDL, LDLCALC, LDLDIRECT, LABVLDL  TSH:  No results found for: TSH  Conclusions      Summary   Ejection fraction is visually estimated at 60%. Overall left ventricular function is normal.   The aortic valve leaflets were not well visualized. Aortic valve appears tricuspid. Aortic valve leaflets are somewhat thickened. Aortic valve leaflets are Mildly calcified. The maximum aortic valve gradient is 30 mmHg, the mean gradient is 15   mmHg, and the peak velocity is 275 cm/s. There is mild-to-moderate aortic stenosis with valve area of 1.5 sq cm. Signature      ----------------------------------------------------------------   Electronically signed by Judah Main MD (Interpreting   physician) on 09/24/2020 at 04:36 PM   ----------------------------------------------------------------    EKG 9/30/19  Sinus  Rhythm   Low voltage in precordial leads.    -  Nonspecific T-abnormality. ABNORMAL   Normal sinus rhythm  Low voltage QRS, consider pulmonary disease, pericardial effusion, or normal variant  Nonspecific T wave abnormality  Abnormal ECG  When compared with ECG of 09-NOV-2011 22:01,  No significant change was found  Confirmed by Mahamed Díaz MD, [facility] ([id number]) on 5/20/2020 8:02:47    Assessment       Diagnosis Orders   1. Chronic heart failure with preserved ejection fraction ([address]     2. Hypertension, essential     3. Dyslipidemia     4. Aortic stenosis, mild to moderate     5. History of atrial fibrillation           Plan     The most current meds and labs reviewed    Continue the current treatment and with constant vigilance to changes in symptoms and also any potential side effects. Return for care or seek medical attention immediately if symptoms got worse and/or develop new symptoms. Hypertension, on medical treatment. Seems to be under good control. Patient is compliant with medical treatment.      Echo and nuc stress- WNL  Asa 81 po qd  Limited walking capability  No cp free    Congestive heart failure: no evidence of fluid overload today, no recent hospitalization for CHF  Leg edema +1 stable  k 3.5 nov 19, 2002  On lasix 40 po qd  Start kcl 20 meq po qd  BMP asap and address K      Hyperlipidemia: on statins, followed periodically. Patient need periodic lipid and liver profile. Hx of atr fib - no ekg found But documented on note from outside  Cont apixaban    Document reviewed from outside  Reported CHF and atr fib and meds   Lasix and apixaban for it  Need to get ekg with atr fib    D/w the pat plan of care    From cardiac stand Stable and doing well    Discussed use, benefit, and side effects of prescribed medications. All patient questions answered. Pt voiced understanding. Instructed to continue current medications, diet and exercise. Continue risk factor modification and medical management. Patient agreed with treatment plan. Follow up as directed.     BMP prior to next visit    RTC in 3 months        Sivakumar [facility]

## 2020-12-20 NOTE — PROGRESS NOTES
[facility] Progress Note    NAME: Mark Meraz  DATE: 20  ROOM #: [id number]  : 1970  REASON FOR VISIT: Follow-up (Regular Visit)  CODE STATUS: FULL CODE  ADMISSION DATE: 2020  SKILLED PATIENT: Yes    History obtained from the patient. SUBJECTIVE:  HPI: Mark Meraz is a 44 y.o. male. Pt seen and examined at bedside. -LAST VISIT:  Patient admitted to [facility] from  to  for issues below related to perirectal abscess  D/c summary:  \"50 y. o. male with multiple co-morbidities, with a prolonged hospitalization with covid pna, and multiple issues, discharged on  to [facility] presented to [facility] with rectal bleeding. Patient had similar issues while admitted but at the [facility], patient was having rectal bleed, drainage and pain in the rectum. Per ER, pt has swelling in the area, discharge and redness. Case was discussed with general surgery who recommend admission and consult to surgery. Patients labs were not concerning and actually better than when he was discharged. His hgb is improved as well. Pt was given rocephin and flagyl in the ER, and admitted to the floor with consults to Surgery        During his admission he was worked up and found to have a rectal ulcer from prolonged rectal tube, this was his bleeding source and improved with non operative management.      1. Perirectal ulceration with bleeding: CT abdomen shows colitis. General surgery consulted, no surgical intervention needed.  Infectious disease consulted. Carmen Alejandre consulted, EGD on .  Colonoscopy showed healing perianal ulceration and focal colitis in the ascending colon.  Antibiotics stopped on .  Wound and ostomy consulted.  Continue zinc oxide ointment. 2. Acute LLE DVT: Likely secondary to holding anticoagulation due to bleeding from perirectal ulceration.  Patient started on heparin drip, transition to [address] [place] [address] on . . Will be discharged Elliquis 10mg BID for a week then 5mg BID 3. Hypokalemia: Potassium replaced as needed, at time of discharge was stable and wnl   4. Chronic respiratory failure, OAC: BiPAP at night with naps. 5. Urinary retention chronic: mason from last admission.  Urology follow-up. 6. IDDM: Continue insulin regimen stable during admission   7. Anemia, chronic: Globin stable around 12.  8. Status post COVID-19 pneumonia and prolonged hospital stay with intubation  Physical deconditioning: Physical therapy and Occupational Therapy. \"           Prior to above admission, pt was at [facility] for a prolonged period of time related to [address] [facility]  Patient admitted to [facility] from 9/23 to 28/7 for complications related to COVID-19. D/c summary: \"Initial H and P and Hospital course:-  Mr. Donald Hicks is a  44year-old morbidly obese black male lifetime non-smoker. Ernesto Caicedo has a history of morbid obesity associated with type 2 diabetes mellitus, prior alcohol abuse, hyperlipidemia, hypertension, hypogonadism, nonalcoholic steatohepatitis, obstructive sleep apnea, and gout.  Patient was hospitalized 9/6/2020 through 9/15/2020 with hypoxemic respiratory failure secondary to COVID-19 associated with diffuse bilateral infiltrates.  At that time, patient received Decadron, remdesivir, and danazol.  During hospitalization, he had issues with atrial fibrillation.  His insulin therapy required adjustment.  He was discharged home on subcutaneous Lovenox. Patient returned back to the emergency room on 9/23/2020 with increasing SOB and progressive hypoxia. CXR showed diffuse infiltrates.  Deteriorated and required intubation.  Patient underwent bronchoscopy on 9/27/2020 which demonstrated no mucus production.     Patient extubated 10/2/2020.                Patient reintubated for progressive respiratory failure with progressive obtundation on 10/6/2020.  This was associated with acute oliguric renal failure.  Patient was intubated 10/6/2020, and underwent volume resuscitation.  Fractional excretion of sodium returned less than 1 which was consistent with prerenal azotemia.  After volume resuscitation, patient demonstrated that he was hemoconcentrated with a drop of 2 g in the hemoglobin. With volume resuscitation, urine output did improve.  After patient was intubated on 10/6/2020, he underwent pulmonary lavage which showed MRSA on the PCR but no other organism.  However, patient was found to have greater than 200 white blood cells in the urine.  Patient was treated with Zosyn and doxycycline. Previously, patient had received vancomycin and developed fever while on vancomycin.  Therefore vancomycin was not continued.  Sputum subsequently grew staph aureus. Janice Melvin was discontinued but doxycycline continued on 10/14/2020.     Patient did well with spontaneous breathing trial and was extubated 10/15/2020.     10/21: Weaned to room air.  Respiratory status remained stable.  Spiked fever this morning at 100.4 °F.  Possibly due to rectal wound due to Flexi-Seal.  Restarted on IV Zosyn, ID reconsulted.  Patient would be a good candidate for SNF versus LTACH at discharge     10/22: Doing well on room air today. Corbin Lowers Zosyn for 5 to 7 days.  Faye evaluation.  Continue zinc oxide for rectal wound.  Blood pressure is elevated today, IV hydralazine for BP greater than 160     10/23: Recurrent fevers up to 103.0 °F today.  Lactic acid ordered and normal.  Restarted vancomycin per ID.  Possible PICC line infection versus worsening pneumonia.  IV fluid bolus given.  Patient tachypneic and tachycardic this morning, improved post fluid administration.     10/24: Patient lethargic and only responds to name. Latrell Alegria is unable to stay awake for a complete exam. Latrell Alegria was placed on BiPAP overnight.  He did not have a fever overnight.  Patient was restarted on Vancomycin yesterday per ID.  Patient had mild tachypnea early in the night, however this resolved by morning.  The Patient was normocardic throughout the night.  Following ID for advice and recommendations.     10/26: Patient more awake today than previous. Latrell Alegria is able to answer questions, however limited due to constant use of BiPAP.  He was placed in ICU yesterday due to continued blood in his stools as well as   Hypotension.  He required 1 unite PRBC this morning.  He was not intubated overnight.  A sigmoidoscopy was preformed and showed a large perianal ulcer extending into the anal canal.  GI stated there was nothing they could do to prevent re-bleeding at this point.     10/27: Patient able to answer questions better this morning, however he is still tired/lethargic and cannot stay awake for the entire exam. Latrell Alegria denies chest pain.  He points to his mask and states that it is painful and itchy.  The Night time nurse reports that he had 3 episodes of passing large clots per his rectum.  He has been grabbing at the BiPAP mask and has been succesful in taking it off occasionally, but he desatruates and needs it placed back on. Latrell Alegria hit is toe against the bedrail and ripped off his right toenail.  The nurse kept it in a sample cup.  His HgB is at 7.2 (From 7.6 the night prior).  Will recheck HgB later today and transfuse if necsessary. Judy Donis states only conservative management can be done for the perianal ulcer at this time. Prabha Washington also state that the patient is not a good candidate for PEG tube placement unless he is off of BiPAP.  The patient's father does not want to place a tracheostomy at this time.  Patient till on TPN.     10/28: Patient able to answer some questions today.  Still lethargic on exam. Milton Cabrera is still removing his BiPAP frequently, although his saturation drops below 90 when it is not on.  There were no reported episodes of clots passed overnight.  He had a BM that was mostly green in consistency.  He required 1 unit of PRBC overnight, HgB currently stable at 7.9.  Pre Cert was denied for Faye, starting appeal process.  Antibiotics to continue for another week.  Nothing else can be done at this time except transfusions and antibiotics.  POA states no trach, which means that he will be unable to get a PEG tube.     10/29: Patient asleep for most of exam.  No episodes of Rectal Bleeding seen.  Nurses state he is still trying to take the BiPAP off, but quickly desaturates.  No units of PRBC had to be transfused.  HgB increased to 8.4.  Day 2 of appeal for Faye.  No current change in treatment plans.     10/30: Patient asleep for most of exam, however he is able saturating well without the BiPAP.  Will still need BiPAP at night for KIARA.  No further transfusions of clots passed overnight.  HgB stable.  Day 3 of appeal for Faye.  Since off of BiPAP, Speech Therapy has been consulted to assess dysphagia and ability to eat PO.  A barium swallow shows no aspiration.  ST states a 2 week long term goal to return patient to functional eating status.  Will continue antibiotics.     10/31: Overnight event reported, Patient reported to have fallen out of bed around 0620.  The fall was unwitnessed and he was found on the ground face down. Milton Cabrera stated no loss of consciousness, and denied hitting his head.  The PICC line was pulled out during the incident.  He was helped back into bed, and reported no headaches, neck pain, or back pain.  There are were no visible injuries.             WALIVAF states he is feeling well this morning and is saturating well without the BiPAP.  He states that he \"tripped\" out of bed in the morning, but did not hit his head.  He denies headaches, chest pain, shortness of breath, nausea, vomiting, diarrhea, neck pain, or back pain.  He is able to eat solid food, and TPN is now on hold due to no IV access.  Will order an IV line to be placed, continue antibiotics.     11/1 - 11/6:  Physical deconditioning. Tried removing Dimas, patient started retaining, creatinine briefly spiked up. Resume tamsulosin, and reinserted Dimas catheter. Patient discharged with a Dimas catheter to follow-up with urology in 2 weeks. Patient had great success with modafinil.  Continue to hold all sedative medications. Patient did not have documented episodes of A. fib on EKG\"        Since admission:  Doing better  No further rectal bleeding  Denies sig rectal pain  No fevers  Is off ATB     New DVT LLE  On eliquis now  Mild calf pain/swelling.      UPDATE TODAY:   Doing well since admission  No further rectal bleeding  Anemia improving  No s/s infection   On eliquis yet for DVT, no calf swelling or calf pain    Good progress with PT yet  Getting stronger      -DM2 on Janumet as well as basaglar and SSI with humalog. Since admission sugars have been . No lows.      -HTN: on hydralazine. BP have been <140/90. No CP/SOB     -HF with preserved EF/AS/hx of afib: used to be on CCB and eliquis. Both stopped during above 1st hospitalization. [facility] stopped d/t recurrent rectal bleeding. Back on Eliquis, though, for LLE DVT as above. No evidence for afib during above admission. AS noted on echo this admission. Denies CP/sOB.      -Depression/mood disorder: follows with Anita. On celexa, buspar and abilify. Has occ hallucinations. None at present or in a while. Denies SI/HI. Feels moods are better.      -GERD: on Proptonix. Helps with his GERD.  Denies sxs or dysphagia    Review of Systems  Positive responses are highlighted in bold    Constitutional:  Fever, Chills, Night Sweats, Fatigue, Unexpected changes in weight  HENT:  Ear pain, Tinnitus, Nosebleeds, Trouble swallowing, Hearing loss, Sore throat  Cardiovascular:  Chest Pain, Palpitations, Orthopnea, Paroxysmal Nocturnal Dyspnea  Respiratory:  Cough, Wheezing, Shortness of breath, Chest tightness, Apnea  Gastrointestinal:  Nausea, Vomiting, Diarrhea, Constipation, Heartburn, Blood in stool  Genitourinary:  Difficulty or painful urination, Flank pain, Change in frequency, Urgency  Skin:  Color change, Rash, Itching, Wound  Musculoskeletal:  Joint pain, Back pain, Gait problems, Joint swelling, Myalgias  Neurological:  Dizziness, Headaches, Presyncope, Numbness, Seizures, Tremors  Endocrine:  Heat Intolerance, Cold Intolerance, Polydipsia, Polyphagia, Polyuria      PHYSICAL EXAM:  Vitals:    12/23/20 0515   BP: 121/89   Pulse: 87   Resp: 20   Temp: 97 °F (36.1 °C)   Weight: 246 lb (111.6 kg)   Height: 5' 8\" (1.727 m)     Body mass index is 37.4 kg/m². Pain Score:    3(L foot/ankle)     VS Reviewed  General Appearance: well developed and well- nourished, in no acute distress  Head: normocephalic and atraumatic  Eyes: pupils equal, round, and reactive to light, conjunctivae and eye lids without erythema  ENT: external ear and ear canal normal bilaterally, nose without deformity, nasal mucosa and turbinates normal without polyps, oropharynx normal, dentition is normal for age, no lip or gum lesions noted  Neck: supple and non-tender without mass, no thyromegaly or thyroid nodules, no cervical lymphadenopathy  Pulmonary/Chest: clear to auscultation bilaterally- no wheezes, rales or rhonchi, normal air movement, no respiratory distress or retractions  Cardiovascular: normal rate, regular rhythm, normal S1 and S2, no murmurs, rubs, clicks, or gallops, distal pulses intact Abdomen: soft, non-tender, non-distended, bowel sounds physiologic,  no rebound or guarding, no masses or hernias noted. Liver and spleen without enlargement. Extremities: no cyanosis, clubbing or edema of the lower extremities  Musculoskeletal: No joint swelling or gross deformity   Neuro:  Alert, 2+ patellar reflexes b/l,  normal speech, no focal findings or movement disorder noted  Psych:  Normal affect without evidence of depression or anxiety, insight and judgement are appropriate, memory appears intact  Skin: warm and dry, no rash or erythema. Buttocks wound dressed. ANKLE EXAM:  Examination of the left ankle demonstrates: There is somewhat swelling of the ankle. There is somewhat a joint effusion. There is tenderness of the lateral malleolus. There is tenderness of the medial malleolus. There is not tenderness of the fifth metatarsal.  There is tenderness over the ATFL. There is not tenderness over the proximal fibula. There is not tenderness of the calcaneous. The achilles is intact. Aggarwal test negative. There is not laxity with anterior drawer testing. There is not laxity with Inversion testing. There is not laxity with Eversion testing. L Foot: tenderness between the 1st and 5th metatarsal head and normal microcirculation and capillary reflow    Lab Results   Component Value Date    LABA1C 9.9 09/06/2020    LABA1C 6.4 10/21/2011    LABA1C 5.8 08/01/2011       ASSESSMENT & PLAN  1. Perirectal ulcer     Appears to be healing well  No current issues  Should con't to heal  Monitor for s/s bleeding  F/u GI and surgery     2. Perirectal abscess     Improved  Off ATB  Monitor      3. Acute lower GI bleeding     No further evidence of GI bleeding  H/H stable and improving     4. Acute deep vein thrombosis (DVT) of calf muscle vein of left lower extremity (HCC)     New DVT  eliquis x 6 months     5.  Physical deconditioning     con't PT/OT    6. History of 2019 novel coronavirus disease (COVID-19)     Prolonged issues with this  Dx back in August 2020  Is finally able to get some strength back  Is off O2 now  PT/OT     7. Acute respiratory failure with hypoxia and hypercapnia (HCC)     As per # 6  Prn albuterol  con't Bevespi     8. KIARA treated with BiPAP     con't Bipap     9. KRISTY (acute kidney injury) ([facility]     Improved  Monitor labs.      10. Acute on chronic anemia     Sig improved  H/H close to normal     11. Dysphagia, unspecified type     Improving   Mod diet  ST consult     12. Type 2 diabetes mellitus with diabetic polyneuropathy, with long-term current use of insulin (HCC)     Stable  con't Janumet as well as basaglar and SSI with humalog     13. Hypertension, essential     So far at goal  con't hydralazine     14. History of atrial fibrillation     No recurrence   Off rate control  Back on [address] for DVT      15. Chronic heart failure with preserved ejection fraction (HCC)     Daily wts  BP control  lasix     16. Aortic stenosis, mild to moderate     Mild to moderate  F/u cardio as OP     17. Recurrent major depressive disorder, in full remission ([facility]     Stable  con't celexa, buspar and abilify     Antipsychotic/Antianxiety/Hypnotic/Psychotropic/Sedation/Antidepressant medications are continued at this time because discontinuation may result in adverse effects or return of concerning behaviors/symptoms.     18. Mood disorder ([facility]     As per # 17     19. Hallucinations     As per # 17     20. Gastroesophageal reflux disease, unspecified whether esophagitis present     Stable  Con't protonix     21. Benign prostatic hyperplasia with urinary obstruction     This is improving  Dimas out  con't flomax  F/u urology     22. Dimas catheter in place on admission        23. Vitamin D deficiency     con't supplemenation     24. History of gout     con't allopurinol, doing much better    25.  Acute foot pain, left    Unclear why not improving con't prn tylenol  con't PT  Ortho referral    26.  Acute left ankle pain    As per # 25      Future Appointments   Date Time Provider Anamika Dominguezi   3/16/2021 11:15 AM [facility] [address] [place] [address]   6/22/2021  2:40 PM Mariam Bundy MD N [facility]. [facility] - [address] [place] [address]       Electronically signed by Jordan Koenig DO on 12/23/2020 at 12:12 PM

## 2020-12-21 ENCOUNTER — OUTSIDE SERVICES (OUTPATIENT)
Dept: FAMILY MEDICINE CLINIC | Age: 50
End: 2020-12-21
Payer: MEDICARE

## 2020-12-21 PROCEDURE — 99309 SBSQ NF CARE MODERATE MDM 30: CPT | Performed by: NURSE PRACTITIONER

## 2020-12-22 ENCOUNTER — OFFICE VISIT (OUTPATIENT)
Dept: NEPHROLOGY | Age: 50
End: 2020-12-22
Payer: MEDICARE

## 2020-12-22 VITALS
DIASTOLIC BLOOD PRESSURE: 75 MMHG | OXYGEN SATURATION: 95 % | BODY MASS INDEX: 38.16 KG/M2 | SYSTOLIC BLOOD PRESSURE: 105 MMHG | WEIGHT: 251 LBS | HEART RATE: 88 BPM

## 2020-12-22 PROCEDURE — 1036F TOBACCO NON-USER: CPT | Performed by: INTERNAL MEDICINE

## 2020-12-22 PROCEDURE — G8427 DOCREV CUR MEDS BY ELIG CLIN: HCPCS | Performed by: INTERNAL MEDICINE

## 2020-12-22 PROCEDURE — G8417 CALC BMI ABV UP PARAM F/U: HCPCS | Performed by: INTERNAL MEDICINE

## 2020-12-22 PROCEDURE — 99213 OFFICE O/P EST LOW 20 MIN: CPT | Performed by: INTERNAL MEDICINE

## 2020-12-22 PROCEDURE — 3017F COLORECTAL CA SCREEN DOC REV: CPT | Performed by: INTERNAL MEDICINE

## 2020-12-22 PROCEDURE — G8484 FLU IMMUNIZE NO ADMIN: HCPCS | Performed by: INTERNAL MEDICINE

## 2020-12-22 RX ORDER — HYDROCODONE BITARTRATE AND ACETAMINOPHEN 5; 325 MG/1; MG/1
1 TABLET ORAL EVERY 6 HOURS PRN
COMMUNITY
End: 2020-12-24 | Stop reason: SDUPTHER

## 2020-12-22 NOTE — PROGRESS NOTES
[facility]        Outpatient Follow-Up note         12/22/2020 3:32 PM    Patient Name:   Rush Narayan  YOB: 1970  Primary Care Physician:  No primary care provider on file. Darren Wu [facility]  [address] [facility]  Dept: [phone]  Loc: [phone]     Chief Complaint / Reason for follow-up : Follow Up of Julio post hospital discharge     Interval History :  Patient seen and examined by me. Feels well. Some leg issues  No cp or SOB .       Past History :  Past Medical History:   Diagnosis Date    Aortic stenosis, mild to moderate     BPH (benign prostatic hyperplasia)     Carpal tunnel syndrome on right     rt hand    Chronic gout     DM2 (diabetes mellitus, type 2) (HCC)     Dyslipidemia     GERD (gastroesophageal reflux disease)     Heart failure with preserved ejection fraction (HCC)     History of alcohol abuse     History of atrial fibrillation     History of osteomyelitis     Hx of R foot wound, osteomyelitis July 2018 requiring surgery and IV Vanco    Hypertension, essential     Hypogonadism, male     Major depression     Mood disorder ([address]     Morbid obesity ([address]     DURAN (nonalcoholic steatohepatitis)     KIARA treated with BiPAP     Vitamin D deficiency      Past Surgical History:   Procedure Laterality Date    COLONOSCOPY N/A 11/15/2020    COLONOSCOPY DIAGNOSTIC performed by Maci Metcalf MD at [facility] Right     2018, R foot osteomyelitis    HIP SURGERY Left 6/29/2020    bilateral hip steroid injection, Left HIP FIRST performed by Gila Puri MD at [facility] N/A 10/26/2020    SIGMOIDOSCOPY DIAGNOSTIC FLEXIBLE performed by Renato Márquez MD at [facility]      as a baby    UPPER GASTROINTESTINAL ENDOSCOPY N/A 11/14/2020    EGD DIAGNOSTIC ONLY performed (XYLOCAINE) 5 % ointment Apply topically as needed to painful areas on lower back, hips, knees, and feet 1 Tube 2    hydrALAZINE (APRESOLINE) 50 MG tablet Take 50 mg by mouth 2 times daily       Multiple Vitamins-Minerals (THERAPEUTIC MULTIVITAMIN-MINERALS) tablet Take 1 tablet by mouth daily      Diaper Rash Products (PINXAV) OINT Apply topically      Probiotic Product (SOBIA-BID PROBIOTIC PO) Take 1 tablet by mouth daily       ondansetron (ZOFRAN) 4 MG tablet Take 4 mg by mouth every 8 hours as needed for Nausea or Vomiting      tamsulosin (FLOMAX) 0.4 MG capsule Take 0.4 mg by mouth nightly       folic acid (FOLVITE) 1 MG tablet Take 1 mg by mouth daily      furosemide (LASIX) 40 MG tablet Take 40 mg by mouth daily      insulin lispro (HUMALOG) 100 UNIT/ML injection vial Inject into the skin 3 times daily (before meals) Per scale: 151-200=1 unit, 201-250=2 units, 251-300-3 units, 301-350=4 units, 351-400=5 units.  sitaGLIPtan-metformin (JANUMET)  MG per tablet Take 1 tablet by mouth 2 times daily (with meals)      insulin glargine (LANTUS) 100 UNIT/ML injection vial Inject 10 Units into the skin nightly       senna (SENOKOT) 8.6 MG tablet Take 1 tablet by mouth 2 times daily      ARIPiprazole (ABILIFY PO) Take 15 mg by mouth daily       Citalopram Hydrobromide (CELEXA PO) Take 30 mg by mouth daily From [facility] professional services       Blood Glucose Monitoring Suppl (FREESTYLE LITE) ARTIE Patient needs all supplies for qd testing.  DX: 250.00 1 Device 11       Vitals     /75 (Site: Right Upper Arm, Position: Sitting, Cuff Size: Medium Adult)   Pulse 88   Wt 251 lb (113.9 kg)   SpO2 95%   BMI 38.16 kg/m²  Wt Readings from Last 3 Encounters:   12/22/20 251 lb (113.9 kg)   12/15/20 243 lb 12.8 oz (110.6 kg)   11/07/20 279 lb (126.6 kg)        Physical Exam     General -- no distress, obese  Lungs -- clear  Heart -- S1, S2 heard, JVD - no  Abdomen - soft, non-tender  Extremities -- no edema  CNS - awake and alert    Labs, Radiology and Tests    Labs -    12/20           Potassium 4.3           BUN 7           Creatinine 0.8           eGFR 102                       UPCR            UMCR                          Renal Ultrasound Scan -- 10/20  Right kidney 11.8 cm left kidney 10.6 cm     Assessment    1. Renal -renal function appears to be stable at baseline  -May be having chronic kidney disease stage I due to presence of protein in the urine  -I will quantify in the next visit  2. Electrolytes appear to be within normal limits  3. Essential hypertension running well continue current medications  4. Hx of diabetes mellitus  5. Recent COVID-19 infection  6. meds reviewed and D/W patient  7. Follow-up in 6 months    Tests and orders placed this Encounter     Orders Placed This Encounter   Procedures    Comprehensive Metabolic Panel    Urinalysis with Microscopic    Creatinine, Random Urine    MICROALBUMIN, RANDOM URINE (W/O CREATININE)    Protein, urine, random       Keyona Blank M.D  [facility].

## 2020-12-23 ENCOUNTER — OUTSIDE SERVICES (OUTPATIENT)
Dept: FAMILY MEDICINE CLINIC | Age: 50
End: 2020-12-23
Payer: MEDICARE

## 2020-12-23 VITALS
RESPIRATION RATE: 20 BRPM | SYSTOLIC BLOOD PRESSURE: 121 MMHG | DIASTOLIC BLOOD PRESSURE: 89 MMHG | TEMPERATURE: 97 F | WEIGHT: 246 LBS | BODY MASS INDEX: 37.28 KG/M2 | HEIGHT: 68 IN | HEART RATE: 87 BPM

## 2020-12-23 PROCEDURE — 99309 SBSQ NF CARE MODERATE MDM 30: CPT | Performed by: FAMILY MEDICINE

## 2020-12-24 RX ORDER — HYDROCODONE BITARTRATE AND ACETAMINOPHEN 5; 325 MG/1; MG/1
1 TABLET ORAL EVERY 6 HOURS PRN
Qty: 90 TABLET | Refills: 0 | Status: SHIPPED | OUTPATIENT
Start: 2020-12-24 | End: 2021-10-22

## 2020-12-24 NOTE — TELEPHONE ENCOUNTER
ASSESSMENT & PLAN   Diagnosis Orders   1. Chronic gout without tophus, unspecified cause, unspecified site  HYDROcodone-acetaminophen (NORCO) 5-325 MG per tablet       OAARS reviewed and appropriate. Controlled Substances Monitoring: Periodic Controlled Substance Monitoring: Possible medication side effects, risk of tolerance/dependence & alternative treatments discussed., No signs of potential drug abuse or diversion identified.  Dang Hargrove, DO)      Future Appointments   Date Time Provider [facility]   3/16/2021 11:15 AM Salvador Low MD N [facility] DANIEL CHANCE II.[facility]URKA   6/22/2021  2:40 PM Key Lentz MD N [facility] [address]

## 2021-01-19 ENCOUNTER — OUTSIDE SERVICES (OUTPATIENT)
Dept: FAMILY MEDICINE CLINIC | Age: 51
End: 2021-01-19
Payer: MEDICARE

## 2021-01-19 DIAGNOSIS — M15.9 GENERALIZED OA: ICD-10-CM

## 2021-01-19 DIAGNOSIS — I82.4Y2 ACUTE DEEP VEIN THROMBOSIS (DVT) OF PROXIMAL VEIN OF LEFT LOWER EXTREMITY (HCC): ICD-10-CM

## 2021-01-19 PROCEDURE — 99309 SBSQ NF CARE MODERATE MDM 30: CPT | Performed by: NURSE PRACTITIONER

## 2021-01-21 NOTE — PROGRESS NOTES
NAME: Pedro Ayala  DATE: 2021  ROOM #: 14-2  CODE STATUS: FULL  REASON FOR VISIT: Follow up of chronic medical conditions. : [birth year]  Skilled Patient?: No    HPI: Pt seen and examined at bedside. History is partially obtained from chart review. Patient sitting up on side of bed, alert and oriented. Patient up on side of bed. Stating that he is doing better. He was hopeful to leave the first part of February, however due to a slow rehabilitation process and his desire to be able to ambulate independently of his walker before returning home, he is looking at a March discharge. Notes improved breathing, overall improved pain, but does state that his left shoulder is bothering him and that Tylenol does not seem to work. It is improving with increased ROM, but he would like something stronger than Tylenol to help him when it hurts the most. Denies having pain at rest this visit. PMHx: RA, DM2, AFib, HTN, leukocytosis, CP, abd pain, septic arthritis, hip pain, frontal lobe mass, morbid obesity, long-term opiate analgesic use, hepatic steatosis, esophagitis, neuropathy, weakness, pleurisy, auditory hallucinations.    PSHx: Tracheostomy, cardiac catheterization, Appendectomy, Foot surgery.    Social Hx: No tobacco or EtOH. Denies drug use.    FamHx: CAD    Allergies, medications, labs and radiology reports were reviewed through chart review. Patients past medical, surgical, social and family history have been reviewed and updates were made where appropriate.     Review of Systems  Positive responses are indicated with +    Constitutional:  Fever, Chills,+ Fatigue, Unexpected changes in weight  Eyes:  Eye discharge, Eye pain, Eye redness, Visual disturbances   HENT:  Ear pain, Tinnitus, Nosebleeds, Trouble swallowing  Cardiovascular:  Chest Pain, Palpitations  Respiratory:  Cough, Wheezing, Shortness of breath, Chest tightness, Apnea Gastrointestinal:  Nausea, Vomiting, Diarrhea, Constipation, Heartburn, Blood in stool  Genitourinary:  Difficulty or painful urination, Flank pain, Change in frequency, Urgency  Skin:  Color change, Rash, Itching, Wound  Psychiatric:  Hallucinations, Anxiety, Depression, Suicidal ideation  Hematological:  Enlarged glands, Easy bleeding, Easily bruising  Musculoskeletal:  +Joint pain, Back pain, +Gait problems, Joint swelling, Myalgias  Neurological:  Dizziness, Headaches, Presyncope, Numbness, Seizures, Tremors  Allergy:  Environmental allergies, Food allergies  Endocrine:  Heat Intolerance, Cold Intolerance, Polydipsia, Polyphagia, Polyuria    PHYSICAL EXAM  Vital Signs: T, BP, P, RR, Wt  98, 135/73, 91, 18, 251#  General Appearance: well developed and well- nourished, obese male, in no acute distress  Head: normocephalic and atraumatic  Eyes: pupils equal, round, and reactive to light, conjunctivae and eye lids without erythema  ENT: external ear normal, nose without deformity, nasal mucosa and turbinates normal without polyps, oropharynx normal, dentition is normal for age, no lip or gum lesions noted  Pulmonary/Chest: No respiratory distress or retractions  Extremities: no cyanosis, clubbing or edema of the lower extremities  Musculoskeletal: No joint swelling or gross deformity. Decreased ROM of left shoulder, noting to have more difficult moving it around the 70 to 90 degree points. Left foot with splint in place. Neuro: Alert. normal speech, no focal findings or movement disorder noted  Psych: Normal affect without evidence of depression or anxiety, insight and judgement are appropriate, memory appears intact  Skin: warm and dry. ASSESSMENT AND PLAN  1. Perirectal ulcer/abscess: No further issues. Following with GI. No new orders. 2. Recent hx of acute GI bleed; No recurrence. Labs improved and stable. 3. OA: Left shoulder pain with decrease in ROM, but improvement noted per patient. He has Tylenol which does not adequately relieve pain. He also has Norco available and has not been receiving this as much. Discussed with nursing. They will offer this to him when he asks for pain medication, if Tylenol is not effective. Total Acetaminophen dosing in 24 hours from all sources should not exceed 4 gram.Continue Benzocaine gel. 4. Gout: Continue Allopurinol  5. DVT: LLE. On Eliquis x 6 months. 6. Recent hx of COVID: Has had recurrent issues, however is now demonstrating marked improvement. Off O2. Stating that his breathing has improved. Strength is not quite back to where he would like it to be and is working with therapy to be able to walk with only a cane, and no longer need walker. His plan is to return home the first part of March. 7. Recurrent MDD: Denies depression, SI/HI. Continue Celexa, Buspar, and Abilify. 8. Hx of resp failure with Hypoxia and Hypercapnia: Continue Bevespi and Albuterol. 9. KIARA: Has BiPAP in room, but admits to not being compliant with this. Often stating it does not work, however this is an ongoing issue and RT has evaluated in the past. Encouraged patient to wear. 10. Indigestion/GERD: Denies any s/s. Continue on Protonix  11. MDD/hallucinations: Denies worsening depression or any hallucinations. Continue Abilify, BuSpar, and Citalopram. Being seen by [id number]. 12. Atrial fibrillation: Continue on Eliquis, which is actually for DVT. 13. BPH:  Continue on Flomax. Following with urology as he was having retention issues previously, but no further concerns with removal of mason over a month ago. 14. Diabetic neuropathy: Continue on Gabapentin. 15. HTN: BP stable, at goal.Continue on Lasix, Hydralazine. 16. DM2:  Continue on  Humalog SSI, Basaglar Janumet XR. 17. CHF: No s/s of exacerbation. Continue on Lasix. 18. Constipation: Denies. Continue on Miralax, Senna, Probiotic. 19. Left foot pain: Wearing splint on schedule as determined by ortho. Follow their POC. Antipsychotic/Antianxiety/Hypnotic/Psychotropic/Sedation/Antidepressant medications are continued at this time because discontinuation may result in adverse effects or return of concerning behaviors/symptoms.       Plan of care reviewed with Dr Keenan Llanes, CNP

## 2021-02-09 ENCOUNTER — OUTSIDE SERVICES (OUTPATIENT)
Dept: FAMILY MEDICINE CLINIC | Age: 51
End: 2021-02-09
Payer: MEDICARE

## 2021-02-09 DIAGNOSIS — R35.0 BENIGN PROSTATIC HYPERPLASIA WITH URINARY FREQUENCY: Chronic | ICD-10-CM

## 2021-02-09 DIAGNOSIS — N40.1 BENIGN PROSTATIC HYPERPLASIA WITH URINARY FREQUENCY: Chronic | ICD-10-CM

## 2021-02-09 PROCEDURE — 99308 SBSQ NF CARE LOW MDM 20: CPT | Performed by: NURSE PRACTITIONER

## 2021-02-09 NOTE — PROGRESS NOTES
NAME: Kathy Ruiz  DATE: 2021  ROOM #: 14-2  CODE STATUS: FULL  REASON FOR VISIT: Acute  : [birth year]  Skilled Patient?: No    HPI: Pt seen and examined at bedside. History is partially obtained from chart review. Patient sitting up on side of bed, alert and oriented. He requests to talk about his flomax. When he was previously in the hospital, he had a mason catheter due to retention and had been started on Flomax. He has tolerated the removal of the mason however he states that the Flomax causes him to urinate too much and he is not going to take it. Nursing reporting that he has refused to take it for the same reasons. Denies any urgency, foul odor of urine, painful urination, or lower back pain. PMHx: RA, DM2, AFib, HTN, leukocytosis, CP, abd pain, septic arthritis, hip pain, frontal lobe mass, morbid obesity, long-term opiate analgesic use, hepatic steatosis, esophagitis, neuropathy, weakness, pleurisy, auditory hallucinations.    PSHx: Tracheostomy, cardiac catheterization, Appendectomy, Foot surgery.    Social Hx: No tobacco or EtOH. Denies drug use.    FamHx: CAD    Allergies, medications, labs and radiology reports were reviewed through chart review. Patients past medical, surgical, social and family history have been reviewed and updates were made where appropriate.     Review of Systems  Positive responses are indicated with +    Constitutional:  Fever, Chills,+ Fatigue, Unexpected changes in weight  Eyes:  Eye discharge, Eye pain, Eye redness, Visual disturbances   HENT:  Ear pain, Tinnitus, Nosebleeds, Trouble swallowing  Cardiovascular:  Chest Pain, Palpitations  Respiratory:  Cough, Wheezing, Shortness of breath, Chest tightness, Apnea  Gastrointestinal:  Nausea, Vomiting, Diarrhea, Constipation, Heartburn, Blood in stool  Genitourinary:  Difficulty or painful urination, Flank pain, Change in frequency, Urgency  Skin:  Color change, Rash, Itching, Wound Psychiatric:  Hallucinations, Anxiety, Depression, Suicidal ideation  Hematological:  Enlarged glands, Easy bleeding, Easily bruising  Musculoskeletal:  +Joint pain, Back pain, +Gait problems, Joint swelling, Myalgias  Neurological:  Dizziness, Headaches, Presyncope, Numbness, Seizures, Tremors  Allergy:  Environmental allergies, Food allergies  Endocrine:  Heat Intolerance, Cold Intolerance, Polydipsia, Polyphagia, Polyuria    PHYSICAL EXAM  Vital Signs: T, BP, P, RR, Wt  98, 132/79, 77, 18, 267#  General Appearance: well developed and well- nourished, obese male, in no acute distress  Head: normocephalic and atraumatic  Eyes: pupils equal, round, and reactive to light, conjunctivae and eye lids without erythema  Pulmonary/Chest: No respiratory distress or retractions  Neuro: Alert. normal speech, no focal findings or movement disorder noted  Psych: Normal affect without evidence of depression or anxiety, insight and judgement are appropriate, memory appears intact  Skin: warm and dry. ASSESSMENT AND PLAN    1. BPH: Patient has refused Flomax and is stating that he does not want to take this due to it making him urinate too much. He is convinced that this is the problem and wants it stopped. Stating that he saw the kidney Dr and that every thing was ok. However, he was supposed to see urology and due to being back in the hospital, he missed this appointment. Discussed with charge nurse that he needs to be seen by urology. At this time, stopping Flomax per patient request and he has been educated that if retention should occur, this could result in him being recatheterized, on medication, and if it should be obstructive, could end up in hospitalization. He is to see urology for further evaluation and treatment.      Plan of care reviewed with Dr Khai Perry, CNP

## 2021-02-18 ENCOUNTER — OUTSIDE SERVICES (OUTPATIENT)
Dept: FAMILY MEDICINE CLINIC | Age: 51
End: 2021-02-18
Payer: MEDICARE

## 2021-02-18 DIAGNOSIS — M15.9 GENERALIZED OA: ICD-10-CM

## 2021-02-18 PROCEDURE — 99308 SBSQ NF CARE LOW MDM 20: CPT | Performed by: NURSE PRACTITIONER

## 2021-02-18 NOTE — PROGRESS NOTES
NAME: Summer Soriano  DATE: 21  ROOM #: 14-2  CODE STATUS: FULL  REASON FOR VISIT: Follow up of chronic medical conditions. : [birth year]  Skilled Patient?: No    HPI: Pt seen and examined at bedside. History is partially obtained from chart review. Patient sitting up on side of bed, alert and oriented. Patient up in wheelchair. States that he is doing \"really good and going home on the \". He feels that he has gained confidence through therapy and feels that he has improved his ability to ambulate and his breathing has improved. He denies pain, cough, SOB at rest, and is sleeping well. Appetite is good. Having regular BMs. Nursing has no new concerns. PMHx: RA, DM2, AFib, HTN, leukocytosis, CP, abd pain, septic arthritis, hip pain, frontal lobe mass, morbid obesity, long-term opiate analgesic use, hepatic steatosis, esophagitis, neuropathy, weakness, pleurisy, auditory hallucinations.    PSHx: Tracheostomy, cardiac catheterization, Appendectomy, Foot surgery.    Social Hx: No tobacco or EtOH. Denies drug use.    FamHx: CAD    Allergies, medications, labs and radiology reports were reviewed through chart review. Patients past medical, surgical, social and family history have been reviewed and updates were made where appropriate.     Review of Systems  Positive responses are indicated with +    Constitutional:  Fever, Chills,+ Fatigue, Unexpected changes in weight  Eyes:  Eye discharge, Eye pain, Eye redness, Visual disturbances   HENT:  Ear pain, Tinnitus, Nosebleeds, Trouble swallowing  Cardiovascular:  Chest Pain, Palpitations  Respiratory:  Cough, Wheezing, Shortness of breath, Chest tightness, Apnea  Gastrointestinal:  Nausea, Vomiting, Diarrhea, Constipation, Heartburn, Blood in stool  Genitourinary:  Difficulty or painful urination, Flank pain, Change in frequency, Urgency  Skin:  Color change, Rash, Itching, Wound Psychiatric:  Hallucinations, Anxiety, Depression, Suicidal ideation  Hematological:  Enlarged glands, Easy bleeding, Easily bruising  Musculoskeletal:  Joint pain, Back pain, Gait problems, Joint swelling, Myalgias  Neurological:  Dizziness, Headaches, Presyncope, Numbness, Seizures, Tremors  Allergy:  Environmental allergies, Food allergies  Endocrine:  Heat Intolerance, Cold Intolerance, Polydipsia, Polyphagia, Polyuria    PHYSICAL EXAM  Vital Signs: T, BP, P, RR, Wt  98, 130/75, 82, 18, 267#  General Appearance: well developed and well- nourished, obese male, in no acute distress  Head: normocephalic and atraumatic  Eyes: pupils equal, round, and reactive to light, conjunctivae and eye lids without erythema  ENT: external ear normal, nose without deformity, nasal mucosa and turbinates normal without polyps, oropharynx normal, dentition is normal for age, no lip or gum lesions noted  Pulmonary/Chest: No respiratory distress or retractions  Extremities: no cyanosis, clubbing or edema of the lower extremities  Musculoskeletal: No joint swelling or gross deformity. Neuro: Alert. normal speech, no focal findings or movement disorder noted  Psych: Normal affect without evidence of depression or anxiety, insight and judgement are appropriate, memory appears intact  Skin: warm and dry. ASSESSMENT AND PLAN  1. Recent hx of acute GI bleed; No recurrence. Labs remain stable. 2. OA: Continue tylenol and Benzocaine gel. 3. Gout: No s/s. Continue Allopurinol  4. DVT: LLE. On Eliquis x 6 months. 5. Recurrent MDD: Denies any depression or SI/HI. Continue Celexa, Buspar, and Abilify. 6. Hx of resp failure with Hypoxia and Hypercapnia: Continue Bevespi and Albuterol. 7. KIARA: Has BiPAP in room, encouraged patient to wear as he is often non compliant. 8. Indigestion/GERD: Denies any s/s. Continue on Protonix  9. Atrial fibrillation: Continue on Eliquis, which is actually for DVT. 10. BPH:  Off flomax and doing well. To follow up with urology. 11. Diabetic neuropathy: Continue on Gabapentin. 12. HTN: BP stable, at goal.Continue on Lasix and Hydralazine. 13. DM2:  Continue on  Humalog SSI, Basaglar Janumet XR. 14. CHF: No s/s of exacerbation. Continue on Lasix. 15. Constipation: Denies. Continue on Miralax, Senna, Probiotic. Antipsychotic/Antianxiety/Hypnotic/Psychotropic/Sedation/Antidepressant medications are continued at this time because discontinuation may result in adverse effects or return of concerning behaviors/symptoms.       Plan of care reviewed with Dr Loreta Espinoza, CNP

## 2021-03-16 ENCOUNTER — TELEPHONE (OUTPATIENT)
Dept: CARDIOLOGY CLINIC | Age: 51
End: 2021-03-16

## 2021-03-16 NOTE — LETTER
[address]  [address] [address]  Phone: [phone]  Fax: [phone]      March 16, 2021     Claudine Holbrook  [address] [facility] [address]      Dear aSul Morales:    I am writing you because I have been informed of your missed appointment(s). We care about you and the management of your healthcare and want to make sure that you follow up as recommended. We're sorry you were unable to keep your appointment and hope that you are doing well. We would like to continue treating your healthcare needs. Please call the office to let us know your plans for treatment and to reschedule your appointment.      Sincerely,        Douglas Andino MD

## 2021-06-24 ENCOUNTER — OFFICE VISIT (OUTPATIENT)
Dept: RHEUMATOLOGY | Age: 51
End: 2021-06-24
Payer: MEDICARE

## 2021-06-24 VITALS
WEIGHT: 280.6 LBS | SYSTOLIC BLOOD PRESSURE: 146 MMHG | OXYGEN SATURATION: 97 % | HEIGHT: 68 IN | DIASTOLIC BLOOD PRESSURE: 82 MMHG | BODY MASS INDEX: 42.53 KG/M2 | HEART RATE: 78 BPM

## 2021-06-24 DIAGNOSIS — M46.1 BILATERAL SACROILIITIS (HCC): ICD-10-CM

## 2021-06-24 DIAGNOSIS — M21.372 LEFT FOOT DROP: ICD-10-CM

## 2021-06-24 DIAGNOSIS — M1A.9XX1 CHRONIC TOPHACEOUS GOUT: Primary | ICD-10-CM

## 2021-06-24 DIAGNOSIS — G89.29 CHRONIC MIDLINE LOW BACK PAIN WITH BILATERAL SCIATICA: ICD-10-CM

## 2021-06-24 DIAGNOSIS — M54.41 CHRONIC MIDLINE LOW BACK PAIN WITH BILATERAL SCIATICA: ICD-10-CM

## 2021-06-24 DIAGNOSIS — M15.9 PRIMARY OSTEOARTHRITIS INVOLVING MULTIPLE JOINTS: ICD-10-CM

## 2021-06-24 DIAGNOSIS — R21 RASH: ICD-10-CM

## 2021-06-24 DIAGNOSIS — Z51.81 MEDICATION MONITORING ENCOUNTER: ICD-10-CM

## 2021-06-24 DIAGNOSIS — G47.33 OSA (OBSTRUCTIVE SLEEP APNEA): ICD-10-CM

## 2021-06-24 DIAGNOSIS — M54.42 CHRONIC MIDLINE LOW BACK PAIN WITH BILATERAL SCIATICA: ICD-10-CM

## 2021-06-24 DIAGNOSIS — M19.90 INFLAMMATORY ARTHRITIS: ICD-10-CM

## 2021-06-24 PROCEDURE — 99215 OFFICE O/P EST HI 40 MIN: CPT | Performed by: INTERNAL MEDICINE

## 2021-06-24 PROCEDURE — G8417 CALC BMI ABV UP PARAM F/U: HCPCS | Performed by: INTERNAL MEDICINE

## 2021-06-24 PROCEDURE — 1036F TOBACCO NON-USER: CPT | Performed by: INTERNAL MEDICINE

## 2021-06-24 PROCEDURE — G8427 DOCREV CUR MEDS BY ELIG CLIN: HCPCS | Performed by: INTERNAL MEDICINE

## 2021-06-24 PROCEDURE — 3017F COLORECTAL CA SCREEN DOC REV: CPT | Performed by: INTERNAL MEDICINE

## 2021-06-24 ASSESSMENT — ENCOUNTER SYMPTOMS
EYE PAIN: 0
COUGH: 0
SHORTNESS OF BREATH: 0
EYE ITCHING: 0
NAUSEA: 0
DIARRHEA: 0
BACK PAIN: 1
CONSTIPATION: 0
TROUBLE SWALLOWING: 0
ABDOMINAL PAIN: 0

## 2021-06-24 NOTE — PROGRESS NOTES
[facility] RHEUMATOLOGY FOLLOW UP NOTE       Date Of Service: 6/24/2021  Provider: Zandra Armendariz DO    Name: Karen Chau   MRN: [id number]    Chief Complaint(s)     Chief Complaint   Patient presents with    3 Month Follow-Up        History of Present Illness (HPI)     Karen Chau  is a(n)50 y.o. male with a hx of abnormal liver function, alcohol abuse, anemia, arthropathy, carpal tunnel syndrome (right), depression, fatigue, foot pain, HTN, hyperuricemia, DURAN, obesity, KIARA, G2VP, diastolic heart failure, DDD here for the f/u evaluation of tophaceous gout. COVID infection - fall 2020 - hospitalized about 40 days - developed swelling and limited ROm of the left foto/ankle. Ultrasound evaluation of the left leg revealing DVT. Currently on Coumadin. S/p physical therapy wthi improvement of movement. Persistent numbness of the foot/ankle. Limited ROM difficulty ambulating without a cane. Arthralgia affecting the shoulder, elbows, left 2nd PIP, knees, ankles, back. Pain up 6/10 over the past week, most severe in the evenings. + shooting pain in the left leg from the knee to the left foot. Heaviness sensation in the left foot/ankles. Timing: evenings. Aggravating factors: activity, movement, Alleviating factors: percocet, hot baths  +  Gelling. +  AM stiffnss lasting a hours in the knees and left foot/ankles. ,  Weakness bilateral lower extremities. Gout - no flares since the last evaluation. Taking allopurniol 400mg daily         REVIEW OF SYSTEMS: (ROS)    Review of Systems   Constitutional: Negative for fatigue, fever and unexpected weight change. HENT: Negative for congestion and trouble swallowing. Dry  mouth   Eyes: Negative for pain and itching. Respiratory: Negative for cough and shortness of breath. Cardiovascular: Negative for chest pain and leg swelling. Gastrointestinal: Negative for abdominal pain, constipation, diarrhea and nausea.    Endocrine: Negative for cold intolerance and heat intolerance. Genitourinary: Negative for difficulty urinating, frequency and urgency. Musculoskeletal: Positive for arthralgias, back pain, myalgias and neck pain. Negative for joint swelling. Skin: Positive for rash (face). Neurological: Negative for dizziness, weakness, numbness and headaches. Psychiatric/Behavioral: Positive for sleep disturbance. The patient is not nervous/anxious.          Memory issues, depression       PAST MEDICAL HISTORY      Past Medical History:   Diagnosis Date    Aortic stenosis, mild to moderate     BPH (benign prostatic hyperplasia)     Carpal tunnel syndrome on right     rt hand    Chronic gout     DM2 [facility]diabetes mellitus, type 2) ([facility])     Dyslipidemia     GERD (gastroesophageal reflux disease)     Heart failure with preserved ejection fraction ([facility])     History of alcohol abuse     History of atrial fibrillation     History of osteomyelitis     Hx of R foot wound, osteomyelitis July 2018 requiring surgery and IV Vanco    Hypertension, essential     Hypogonadism, male     Major depression     Mood disorder [facility] [address]     Morbid obesity ([facility]     DURAN (nonalcoholic steatohepatitis)     KIARA treated with BiPAP     Vitamin D deficiency        PAST SURGICAL HISTORY      Past Surgical History:   Procedure Laterality Date    COLONOSCOPY N/A 11/15/2020    COLONOSCOPY DIAGNOSTIC performed by Radha Paul MD at [facility] Right     2018, R foot osteomyelitis    HIP SURGERY Left 6/29/2020    bilateral hip steroid injection, Left HIP FIRST performed by Pamela Mccollum MD at [facility] N/A 10/26/2020    SIGMOIDOSCOPY DIAGNOSTIC FLEXIBLE performed by Scottie Perrin MD at [facility]      as a baby    UPPER GASTROINTESTINAL ENDOSCOPY N/A 11/14/2020    EGD DIAGNOSTIC ONLY performed by Radha Paul MD at [facility]      Family History Problem Relation Age of Onset    Heart Disease Mother         MI    Cancer Paternal Aunt         lung       SOCIAL HISTORY      Social History     Tobacco History     Smoking Status  Never Smoker    Smokeless Tobacco Use  Never Used          Alcohol History     Alcohol Use Status  No Comment  hx of abuse          Drug Use     Drug Use Status  No          Sexual Activity     Sexually Active  Not Asked                ALLERGIES     Allergies   Allergen Reactions    Penicillins      Tolerated Zosyn 9/24/2020       CURRENT MEDICATIONS      Current Outpatient Medications   Medication Sig Dispense Refill    HYDROcodone-acetaminophen (NORCO) 5-325 MG per tablet Take 1 tablet by mouth every 6 hours as needed for Pain for up to 30 days. 90 tablet 0    potassium chloride (KLOR-CON M) 20 MEQ extended release tablet Take 1 tablet by mouth daily 30 tablet 2    apixaban (ELIQUIS) 5 MG TABS tablet Take 10mg by mouth two times daily for 6 days followed by 5mg by mouth two times daily 60 tablet 1    glycopyrrolate-formoterol (BEVESPI) 9-4.8 MCG/ACT AERO Inhale 2 puffs into the lungs 2 times daily 10.7 g 1    ferrous sulfate (IRON 325) 325 (65 Fe) MG tablet Take 1 tablet by mouth 2 times daily (with meals) 30 tablet 3    albuterol sulfate HFA (PROVENTIL HFA) 108 (90 Base) MCG/ACT inhaler Inhale 2 puffs into the lungs every 6 hours as needed for Wheezing 1 Inhaler 3    gabapentin (NEURONTIN) 400 MG capsule Take 1 capsule by mouth 2 times daily for 180 days. 90 capsule 3    vitamin C (ASCORBIC ACID) 500 MG tablet Take 2 tablets by mouth daily 30 tablet 0    CHOLECALCIFEROL PO Take 2,000 Units by mouth daily      atorvastatin (LIPITOR) 40 MG tablet Take 40 mg by mouth nightly      acetaminophen (TYLENOL) 325 MG tablet Take 650 mg by mouth every 4 hours as needed for Pain      benzocaine (BABY ORAJEL) 7.5 % oral gel Take by mouth 4 times daily as needed for Pain (mouth pain) Take by mouth 3 times daily.       guaiFENesin (ROBITUSSIN) 100 MG/5ML syrup Take 200 mg by mouth every 4 hours as needed for Cough      busPIRone (BUSPAR) 10 MG tablet Take 10 mg by mouth 2 times daily       Trolamine Salicylate (ASPERCREME) 10 % LOTN Apply topically as needed for Pain 1 Bottle 0    pantoprazole (PROTONIX) 40 MG tablet Take 1 tablet by mouth daily 5 tablet 0    allopurinol (ZYLOPRIM) 300 MG tablet Take 1 tablet by mouth daily 90 tablet 0    lidocaine (XYLOCAINE) 5 % ointment Apply topically as needed to painful areas on lower back, hips, knees, and feet 1 Tube 2    hydrALAZINE (APRESOLINE) 50 MG tablet Take 50 mg by mouth 2 times daily       Multiple Vitamins-Minerals (THERAPEUTIC MULTIVITAMIN-MINERALS) tablet Take 1 tablet by mouth daily      Diaper Rash Products (PINXAV) OINT Apply topically      Probiotic Product (SOBIA-BID PROBIOTIC PO) Take 1 tablet by mouth daily       ondansetron (ZOFRAN) 4 MG tablet Take 4 mg by mouth every 8 hours as needed for Nausea or Vomiting      tamsulosin (FLOMAX) 0.4 MG capsule Take 0.4 mg by mouth nightly       folic acid (FOLVITE) 1 MG tablet Take 1 mg by mouth daily      furosemide (LASIX) 40 MG tablet Take 40 mg by mouth daily      insulin lispro (HUMALOG) 100 UNIT/ML injection vial Inject into the skin 3 times daily (before meals) Per scale: 151-200=1 unit, 201-250=2 units, 251-300-3 units, 301-350=4 units, 351-400=5 units.  sitaGLIPtan-metformin (JANUMET)  MG per tablet Take 1 tablet by mouth 2 times daily (with meals)      insulin glargine (LANTUS) 100 UNIT/ML injection vial Inject 10 Units into the skin nightly       senna (SENOKOT) 8.6 MG tablet Take 1 tablet by mouth 2 times daily      ARIPiprazole (ABILIFY PO) Take 15 mg by mouth daily       Citalopram Hydrobromide (CELEXA PO) Take 30 mg by mouth daily From [facility] professional services       Blood Glucose Monitoring Suppl (FREESTYLE LITE) ARTIE Patient needs all supplies for qd testing.  DX: 250.00 1 Device 11     No tenderness. Spine:       Pain to palpation lumbar spine     LABS    CBC  Lab Results   Component Value Date    WBC 5.7 11/19/2020    RBC 3.86 11/19/2020    RBC 4.55 04/15/2012    HGB 11.0 11/19/2020    HCT 36.2 11/19/2020    MCV 93.8 11/19/2020    MCH 28.5 11/19/2020    MCHC 30.4 11/19/2020    RDW 19.9 04/06/2020     11/19/2020       CMP  Lab Results   Component Value Date    CALCIUM 9.2 11/19/2020    LABALBU 3.1 11/08/2020    LABALBU 4.4 01/26/2012    PROT 6.1 11/08/2020     11/19/2020    K 3.5 11/19/2020    CO2 29 11/19/2020     11/19/2020    BUN 4 11/19/2020    CREATININE 0.8 11/19/2020    ALKPHOS 94 11/08/2020    ALT 14 11/08/2020    AST 22 11/08/2020       HgBA1c: No components found for: HGBA1C    Lab Results   Component Value Date    VITD25 26 10/17/2020         Lab Results   Component Value Date    ANASCRN None Detected 10/06/2020     No results found for: SSA  No results found for: SSB  No results found for: ANTI-SMITH  Lab Results   Component Value Date    DSDNAAB SEE BELOW 04/27/2016      No results found for: ANTIRNP  No results found for: C3, C4  No results found for: CCPAB  Lab Results   Component Value Date    RF <10 01/23/2019       No components found for: CANCASCRN, APANCASCRN  Lab Results   Component Value Date    SEDRATE 9 04/06/2020     Lab Results   Component Value Date    CRP 25.10 (H) 09/23/2020       RADIOLOGY:      TTE procedure:ECHOCARDIOGRAM COMPLETE 2D W DOPPLER W COLOR.      Procedure Date  Date: 09/24/2020 Start: 10:14 AM     Study Location: Bedside  Technical Quality: Limited visualization due to body habitus.     Indications:Hypoxia, Fatigue and Dyspnea / Shortness of breath.     Additional Medical History:A fib, diabetes, CHF, ETOH abuse, Morbid obesity,  KIARA, GERD, Anemia, COVID     Patient Status: Routine     Height: 67.72 inches Weight: 285.01 pounds BSA: 2.37 m^2 BMI: 43.7 kg/m^2     BP: 99/45 mmHg      Conclusions      Summary   Ejection fraction is visually estimated at 60%. Overall left ventricular function is normal.   The aortic valve leaflets were not well visualized. Aortic valve appears tricuspid. Aortic valve leaflets are somewhat thickened. Aortic valve leaflets are Mildly calcified. The maximum aortic valve gradient is 30 mmHg, the mean gradient is 15   mmHg, and the peak velocity is 275 cm/s. There is mild-to-moderate aortic stenosis with valve area of 1.5 sq cm. Signature      ----------------------------------------------------------------   Electronically signed by Effie Hussein MD (Interpreting   physician) on 09/24/2020 at 04:36 PM   ----------------------------------------------------------------      Impression   1. Slight thoracolumbar levoscoliosis. Cannot exclude muscle spasm right side. 2. Mild degenerative vertebral body spondylosis scattered in the lumbar spine. No acute fracture seen. Questionable partial fusion right sacroiliac joint. Mild degenerative changes left sacroiliac joint. 3. Moderate degenerative facet joint arthropathy lower 2 lumbar levels. 4. Increased bone density in the right iliac bone inferiorly with associated degenerative changes of the right hip and bony irregularity of the right ischial spine, suggesting possible prior trauma. Clinical correlation recommended.               Left lower ext u/s :   Findings indicate the presence of acute DVT in one of the left peroneal veins and one left posterior tibial veins. All other deep veins are clear. Perfect serve Voice message left with Jacinto Willarddeny           ASSESSMENT/PLAN    Assessment   Plan     Patient previously diagnosed with sarcoidosis by Dr. Claire Cabot. Treated by Dr. Annemarie Degroot and lastly Dr. Milana Mina ([place] in 2018) for rheumatoid arthritis and dermatomyositis. Initially started having pain affecting bilateral feet around [id number]. Diagnosis gout treated with prednisone which resolved symptoms.   Recurrence of symptoms and subsequently diagnosed with rheumatoid arthritis followed by dermatomyositis with calcinosis. Prior treatment - Prior tx - enbrel - relief but developed -- recurrent infections w/ recurrent infections, and MRSA. methotrexate 15 mg weekly, folic acid. Upon transition to [facility] rheumatology clinic in 2019 he was taking. Treated with imuran 50mg daily, prednisone, tramadol. , colchicine, naproxen, plaquenil. Patient was mainly c/o hand and foot pain at that time, diagnostic aspiration of the olecranon bursa revealed uric acid crystals and patient was started on therapy for chronic tophaceous gout w/ allopurniol 500mg daily and prednisone 5mg daily (as bridge). With improvement of the tophaceous deposits. He continued to have some joint pains and joint swelling which was attributed to the gout and osteoarthritis. Patient not seen since may 2020 b/c COVID pandemic, and his hospitalization for COVID. Late 2019 to early 2020 patient was c/o back pain with x-ray of lumbarsacral spine ordered by pain management - XR with SI joint fusion. Patient was seen in May 2020 but was unaware of the x-ray at that time. Admission Fall 2020 for [facility]. CT a/p with noted fusion of the bilatearl sacroiliac joints, spondylosis with large bucket-handle syndesmophytes affecting the right lateral thoracic spine. Undiff axial spondylarthritis. Chronic low back pain. - prior hx of dermatomyositis and rheumatoid athritis. - serologies inconsistent with RA, normal CK/aldolase despite d/c of therapy. Patient with + active inflammatory arthriits, + entehopathopathy, + CT abd/pelvis consistent with sacroilitis with SI joint fusions bilat, and colonic thickening (negative C-scope evaluation 11/2020)    - with the active scaroiliitis will plan on starting anti-TNF vs IL-17 inhibo. if the TB testing is negative. # Left foot drop - evaluation as below. # dvt  - on coumadin   ? Post covid vs other.  - ? Sensory motor neuropathy  Vs radiculopathy given the weakness, decreaed proprioception and pain sensation in the left foot, weakness of the left ankle. - left lower ext u/s to evaluate for DVT. - EMG/NCV ordered,       # Rash - face, back , nail changes - ? Tinea vs psroasis vs eczema vs other. ? Seborrhea. - referral to dermatology     - request dermatolgoy records from [facility] - dr. Lio Meyer. # Polyarticular tophaceous gout  - prior left elbow aspiration crystal studies + monosodium urate. Generalized tophaceous deposits   - allopurinol 500 mg daily- titrate to goal uric acid < 5 mg/dl   - prednisone 5 mg daily   -  recheck uric acid level- Goal <5 mg/dl - last uric acid was 5.2 on April 2020     History of sleep apnea: Not currently on CPAP as patient was discharged from nursing home currently living on his own. Referred to sleep study/medicine for evaluation and initiation of CPAP. Osteoarthritis of multiple joints   - Percocet PRN   - bilateral knee injections for continued knee pain - requested by the patient     Medication monitoring   - CBC, CMP, sed rate, CRP q 8 weeks. -  Need uric acid at this time           Return in about 2 months (around 8/24/2021).         Electronically signed by Laly Mariano DO on 6/24/2021 at 9:58 AM    New Prescriptions    No medications on file

## 2021-08-06 ENCOUNTER — TELEPHONE (OUTPATIENT)
Dept: PULMONOLOGY | Age: 51
End: 2021-08-06

## 2021-09-02 ENCOUNTER — OFFICE VISIT (OUTPATIENT)
Dept: RHEUMATOLOGY | Age: 51
End: 2021-09-02
Payer: MEDICARE

## 2021-09-02 VITALS
SYSTOLIC BLOOD PRESSURE: 136 MMHG | OXYGEN SATURATION: 94 % | WEIGHT: 284 LBS | BODY MASS INDEX: 43.04 KG/M2 | DIASTOLIC BLOOD PRESSURE: 84 MMHG | HEIGHT: 68 IN | HEART RATE: 84 BPM

## 2021-09-02 DIAGNOSIS — Z86.69 H/O SLEEP APNEA: ICD-10-CM

## 2021-09-02 DIAGNOSIS — M1A.9XX1 CHRONIC TOPHACEOUS GOUT: ICD-10-CM

## 2021-09-02 DIAGNOSIS — M15.9 PRIMARY OSTEOARTHRITIS INVOLVING MULTIPLE JOINTS: ICD-10-CM

## 2021-09-02 DIAGNOSIS — G89.29 CHRONIC MIDLINE LOW BACK PAIN WITH BILATERAL SCIATICA: ICD-10-CM

## 2021-09-02 DIAGNOSIS — G47.33 OSA (OBSTRUCTIVE SLEEP APNEA): ICD-10-CM

## 2021-09-02 DIAGNOSIS — Z51.81 MEDICATION MONITORING ENCOUNTER: ICD-10-CM

## 2021-09-02 DIAGNOSIS — M54.41 CHRONIC MIDLINE LOW BACK PAIN WITH BILATERAL SCIATICA: ICD-10-CM

## 2021-09-02 DIAGNOSIS — M21.372 LEFT FOOT DROP: ICD-10-CM

## 2021-09-02 DIAGNOSIS — M54.42 CHRONIC MIDLINE LOW BACK PAIN WITH BILATERAL SCIATICA: ICD-10-CM

## 2021-09-02 DIAGNOSIS — M19.90 INFLAMMATORY ARTHRITIS: Primary | ICD-10-CM

## 2021-09-02 DIAGNOSIS — R21 RASH: ICD-10-CM

## 2021-09-02 DIAGNOSIS — M46.1 BILATERAL SACROILIITIS (HCC): ICD-10-CM

## 2021-09-02 PROCEDURE — G8417 CALC BMI ABV UP PARAM F/U: HCPCS | Performed by: NURSE PRACTITIONER

## 2021-09-02 PROCEDURE — 1036F TOBACCO NON-USER: CPT | Performed by: NURSE PRACTITIONER

## 2021-09-02 PROCEDURE — G8427 DOCREV CUR MEDS BY ELIG CLIN: HCPCS | Performed by: NURSE PRACTITIONER

## 2021-09-02 PROCEDURE — 99214 OFFICE O/P EST MOD 30 MIN: CPT | Performed by: NURSE PRACTITIONER

## 2021-09-02 PROCEDURE — 3017F COLORECTAL CA SCREEN DOC REV: CPT | Performed by: NURSE PRACTITIONER

## 2021-09-02 ASSESSMENT — ENCOUNTER SYMPTOMS
TROUBLE SWALLOWING: 0
EYE ITCHING: 0
CONSTIPATION: 0
ABDOMINAL PAIN: 0
NAUSEA: 0
EYE PAIN: 0
SHORTNESS OF BREATH: 0
COUGH: 0
BACK PAIN: 1
DIARRHEA: 0

## 2021-09-02 NOTE — PROGRESS NOTES
[facility] RHEUMATOLOGY FOLLOW UP NOTE       Date Of Service: 9/2/2021  Provider: SANDRA Agarwal - CNP    Name: Jamilah Clinton   MRN: [id number]    Chief Complaint(s)     Chief Complaint   Patient presents with    Follow-up     2 month f/u Chronic tophaceous gout        History of Present Illness (HPI)     Jamilah Clinton  is a(n)50 y.o. male with a hx of abnormal liver function, alcohol abuse, anemia, arthropathy, carpal tunnel syndrome (right), depression, fatigue, foot pain, HTN, hyperuricemia, DURAN, obesity, KIARA, H4JS, diastolic heart failure, DDD here for the f/u evaluation of tophaceous gout. Interval hx:    - no new issues- has not had bloodwork or TB testing done. Attempted to get EMG done, however was unable to be completed due to leg swelling    pain affecting the hands, wrists, knees, ankles, toes, neck, back  Pain on a scale 0-10: 6/10  Type of pain: intermittent, varying pain. Cramping, numbness/tingling, shooting legs down left leg  Timing: morning  Aggravating factors: weather changes  Alleviating factors: percocet, hot baths    Associated symptoms:  + swelling/  Redness/ warmth (left hand), + AM stiffness lasting ~ several hours      REVIEW OF SYSTEMS: (ROS)    Review of Systems   Constitutional: Negative for fatigue, fever and unexpected weight change. HENT: Negative for congestion and trouble swallowing. Dry  mouth   Eyes: Negative for pain and itching. Respiratory: Negative for cough and shortness of breath. Cardiovascular: Negative for chest pain and leg swelling. Gastrointestinal: Negative for abdominal pain, constipation, diarrhea and nausea. Endocrine: Negative for cold intolerance and heat intolerance. Genitourinary: Negative for difficulty urinating, frequency and urgency. Musculoskeletal: Positive for arthralgias, back pain, myalgias and neck pain. Negative for joint swelling. Skin: Positive for rash (scales on face).    Neurological: Positive for weakness (left leg) and numbness (left leg). Negative for dizziness and headaches. Psychiatric/Behavioral: Positive for sleep disturbance. The patient is not nervous/anxious.          Memory issues, depression       PAST MEDICAL HISTORY      Past Medical History:   Diagnosis Date    Aortic stenosis, mild to moderate     BPH (benign prostatic hyperplasia)     Carpal tunnel syndrome on right     rt hand    Chronic gout     DM2 (diabetes mellitus, type 2) (HCC)     Dyslipidemia     GERD (gastroesophageal reflux disease)     Heart failure with preserved ejection fraction (HCC)     History of alcohol abuse     History of atrial fibrillation     History of osteomyelitis     Hx of R foot wound, osteomyelitis July 2018 requiring surgery and IV Vanco    Hypertension, essential     Hypogonadism, male     Major depression     Mood disorder ([address]     Morbid obesity [address] [address]     DURAN (nonalcoholic steatohepatitis)     KIARA treated with BiPAP     Vitamin D deficiency        PAST SURGICAL HISTORY      Past Surgical History:   Procedure Laterality Date    COLONOSCOPY N/A 11/15/2020    COLONOSCOPY DIAGNOSTIC performed by Celeste Ruiz MD at [facility] Right     2018, R foot osteomyelitis    HIP SURGERY Left 6/29/2020    bilateral hip steroid injection, Left HIP FIRST performed by Bev Bhatia MD at [facility] N/A 10/26/2020    [address] [facility] performed by Alejandro Templeton MD at [facility]      as a baby    UPPER GASTROINTESTINAL ENDOSCOPY N/A 11/14/2020    EGD DIAGNOSTIC ONLY performed by Celeste Ruiz MD at [facility]       FAMILY HISTORY      Family History   Problem Relation Age of Onset    Heart Disease Mother         MI    Cancer Paternal Aunt         lung       SOCIAL HISTORY      Social History     Tobacco History     Smoking Status  Never Smoker    Smokeless Tobacco Use  Never Used Alcohol History     Alcohol Use Status  No Comment  hx of abuse          Drug Use     Drug Use Status  No          Sexual Activity     Sexually Active  Not Asked                ALLERGIES     Allergies   Allergen Reactions    Penicillins      Tolerated Zosyn 9/24/2020       CURRENT MEDICATIONS      Current Outpatient Medications   Medication Sig Dispense Refill    HYDROcodone-acetaminophen (NORCO) 5-325 MG per tablet Take 1 tablet by mouth every 6 hours as needed for Pain for up to 30 days. 90 tablet 0    potassium chloride (KLOR-CON M) 20 MEQ extended release tablet Take 1 tablet by mouth daily 30 tablet 2    apixaban (ELIQUIS) 5 MG TABS tablet Take 10mg by mouth two times daily for 6 days followed by 5mg by mouth two times daily 60 tablet 1    glycopyrrolate-formoterol (BEVESPI) 9-4.8 MCG/ACT AERO Inhale 2 puffs into the lungs 2 times daily 10.7 g 1    ferrous sulfate (IRON 325) 325 (65 Fe) MG tablet Take 1 tablet by mouth 2 times daily (with meals) 30 tablet 3    albuterol sulfate HFA (PROVENTIL HFA) 108 (90 Base) MCG/ACT inhaler Inhale 2 puffs into the lungs every 6 hours as needed for Wheezing 1 Inhaler 3    gabapentin (NEURONTIN) 400 MG capsule Take 1 capsule by mouth 2 times daily for 180 days. 90 capsule 3    vitamin C (ASCORBIC ACID) 500 MG tablet Take 2 tablets by mouth daily 30 tablet 0    CHOLECALCIFEROL PO Take 2,000 Units by mouth daily      atorvastatin (LIPITOR) 40 MG tablet Take 40 mg by mouth nightly      acetaminophen (TYLENOL) 325 MG tablet Take 650 mg by mouth every 4 hours as needed for Pain      benzocaine (BABY ORAJEL) 7.5 % oral gel Take by mouth 4 times daily as needed for Pain (mouth pain) Take by mouth 3 times daily.       guaiFENesin (ROBITUSSIN) 100 MG/5ML syrup Take 200 mg by mouth every 4 hours as needed for Cough      busPIRone (BUSPAR) 10 MG tablet Take 10 mg by mouth 2 times daily       Trolamine Salicylate (ASPERCREME) 10 % LOTN Apply topically as needed for Pain 1 Bottle 0    pantoprazole (PROTONIX) 40 MG tablet Take 1 tablet by mouth daily 5 tablet 0    allopurinol (ZYLOPRIM) 300 MG tablet Take 1 tablet by mouth daily 90 tablet 0    lidocaine (XYLOCAINE) 5 % ointment Apply topically as needed to painful areas on lower back, hips, knees, and feet 1 Tube 2    hydrALAZINE (APRESOLINE) 50 MG tablet Take 50 mg by mouth 2 times daily       Multiple Vitamins-Minerals (THERAPEUTIC MULTIVITAMIN-MINERALS) tablet Take 1 tablet by mouth daily      Diaper Rash Products (PINXAV) OINT Apply topically      Probiotic Product (SOBIA-BID PROBIOTIC PO) Take 1 tablet by mouth daily       ondansetron (ZOFRAN) 4 MG tablet Take 4 mg by mouth every 8 hours as needed for Nausea or Vomiting      tamsulosin (FLOMAX) 0.4 MG capsule Take 0.4 mg by mouth nightly       folic acid (FOLVITE) 1 MG tablet Take 1 mg by mouth daily      furosemide (LASIX) 40 MG tablet Take 40 mg by mouth daily      insulin lispro (HUMALOG) 100 UNIT/ML injection vial Inject into the skin 3 times daily (before meals) Per scale: 151-200=1 unit, 201-250=2 units, 251-300-3 units, 301-350=4 units, 351-400=5 units.  sitaGLIPtan-metformin (JANUMET)  MG per tablet Take 1 tablet by mouth 2 times daily (with meals)      insulin glargine (LANTUS) 100 UNIT/ML injection vial Inject 10 Units into the skin nightly       senna (SENOKOT) 8.6 MG tablet Take 1 tablet by mouth 2 times daily      ARIPiprazole (ABILIFY PO) Take 15 mg by mouth daily       Citalopram Hydrobromide (CELEXA PO) Take 30 mg by mouth daily From [facility] professional services       Blood Glucose Monitoring Suppl (FREESTYLE LITE) ARTIE Patient needs all supplies for qd testing. DX: 250.00 1 Device 11     No current facility-administered medications for this visit. PHYSICAL EXAMINATION / OBJECTIVE   Objective:  Ht 5' 7.99\" (1.727 m)   BMI 42.68 kg/m²     Physical Exam  Vitals reviewed. Constitutional:       Appearance: He is well-developed. Cardiovascular:      Rate and Rhythm: Normal rate and regular rhythm. Pulmonary:      Effort: Pulmonary effort is normal.      Breath sounds: Normal breath sounds. Abdominal:      Palpations: Abdomen is soft. Tenderness: There is no abdominal tenderness. Musculoskeletal:      Cervical back: Normal range of motion and neck supple. Skin:     General: Skin is warm and dry. Findings: No rash. Comments: Redness bilateral eye lies, and nasolabial folds,   Scars -- left posterior chest wall  Nail thickening bilateral toenails  Onycholysis Rt fingernail  Transverse nail grooves bilat. Redness around nose, eyebrows   Neurological:      Mental Status: He is alert and oriented to person, place, and time. Deep Tendon Reflexes: Reflexes are normal and symmetric. Psychiatric:         Thought Content: Thought content normal.       Musculoskeletal:     weakness left ankle    Upper extremities:   Shoulders:  + tender bilaterally  Elbows:      + swelling bilat  Wrists        Tender and swelling bilaterally; limited flexion and extension  Hands:     Tender bilat PIPs. Lower extremities:  Hips:      + tender left  Knees :   + tender left  Ankles: + swelling bilat  Feet:       + MTP compression left, tenderness left midfoot    Spine:     C-spine: intact ROM, Non-tender, no swelling.    Pain to palpation lumbar spine        RAPID 3:   9/2/2021 --- RAPID 3: 5.3 + 8.5 + 7.0 = 20.8     Remission: <3  Low Disease Activity: <6  Moderate Disease Activity: >=6 and <=12  High Disease Activity: >12     LABS    CBC  Lab Results   Component Value Date    WBC 5.7 11/19/2020    RBC 3.86 11/19/2020    RBC 4.55 04/15/2012    HGB 11.0 11/19/2020    HCT 36.2 11/19/2020    MCV 93.8 11/19/2020    MCH 28.5 11/19/2020    MCHC 30.4 11/19/2020    RDW 19.9 04/06/2020     11/19/2020       CMP  Lab Results   Component Value Date    CALCIUM 9.2 11/19/2020    LABALBU 3.1 11/08/2020    LABALBU 4.4 01/26/2012    PROT 6.1 prednisone 5 mg daily   - need uric acid level rechecked    Osteoarthritis of multiple joints   - Percocet PRN    H/o obstructive sleep apnea   - referral to sleep medicine    Medication monitoring   - CBC, CMP, sed rate, CRP q 8 weeks- due now   - TB gold previously ordered      No follow-ups on file. Electronically signed by SANDRA Davis CNP on 9/2/2021 at 10:00 AM    New Prescriptions    No medications on file       Thank you for allowing me to participate in the care of this patient. Please call if there are any questions.     [phone]

## 2021-09-06 ENCOUNTER — HOSPITAL ENCOUNTER (OUTPATIENT)
Age: 51
Setting detail: OBSERVATION
Discharge: HOME | End: 2021-09-09
Attending: EMERGENCY MEDICINE
Payer: MEDICARE

## 2021-09-06 ENCOUNTER — APPOINTMENT (OUTPATIENT)
Dept: GENERAL RADIOLOGY | Age: 51
End: 2021-09-06
Payer: MEDICARE

## 2021-09-06 ENCOUNTER — APPOINTMENT (OUTPATIENT)
Dept: CT IMAGING | Age: 51
End: 2021-09-06
Payer: MEDICARE

## 2021-09-06 DIAGNOSIS — R10.9 ABDOMINAL PAIN, UNSPECIFIED ABDOMINAL LOCATION: ICD-10-CM

## 2021-09-06 DIAGNOSIS — R77.8 ELEVATED TROPONIN: ICD-10-CM

## 2021-09-06 DIAGNOSIS — I50.32 CHRONIC HEART FAILURE WITH PRESERVED EJECTION FRACTION (HCC): ICD-10-CM

## 2021-09-06 DIAGNOSIS — R07.9 CHEST PAIN, UNSPECIFIED TYPE: Primary | ICD-10-CM

## 2021-09-06 DIAGNOSIS — I10 HYPERTENSION, ESSENTIAL: ICD-10-CM

## 2021-09-06 LAB
ALBUMIN SERPL-MCNC: 4.1 G/DL (ref 3.5–5.1)
ALP BLD-CCNC: 191 U/L (ref 38–126)
ALT SERPL-CCNC: 27 U/L (ref 11–66)
ANION GAP SERPL CALCULATED.3IONS-SCNC: 12 MEQ/L (ref 8–16)
AST SERPL-CCNC: 28 U/L (ref 5–40)
BASOPHILS # BLD: 0.2 %
BASOPHILS # BLD: 0.5 %
BASOPHILS ABSOLUTE: 0 THOU/MM3 (ref 0–0.1)
BASOPHILS ABSOLUTE: 0.1 THOU/MM3 (ref 0–0.1)
BILIRUB SERPL-MCNC: 0.4 MG/DL (ref 0.3–1.2)
BILIRUBIN DIRECT: < 0.2 MG/DL (ref 0–0.3)
BUN BLDV-MCNC: 17 MG/DL (ref 7–22)
CALCIUM SERPL-MCNC: 9.6 MG/DL (ref 8.5–10.5)
CHLORIDE BLD-SCNC: 101 MEQ/L (ref 98–111)
CO2: 27 MEQ/L (ref 23–33)
CREAT SERPL-MCNC: 1.1 MG/DL (ref 0.4–1.2)
D-DIMER QUANTITATIVE: 1133 NG/ML FEU (ref 0–500)
EOSINOPHIL # BLD: 1.7 %
EOSINOPHIL # BLD: 2 %
EOSINOPHILS ABSOLUTE: 0.1 THOU/MM3 (ref 0–0.4)
EOSINOPHILS ABSOLUTE: 0.2 THOU/MM3 (ref 0–0.4)
ERYTHROCYTE [DISTWIDTH] IN BLOOD BY AUTOMATED COUNT: 13.6 % (ref 11.5–14.5)
ERYTHROCYTE [DISTWIDTH] IN BLOOD BY AUTOMATED COUNT: 13.8 % (ref 11.5–14.5)
ERYTHROCYTE [DISTWIDTH] IN BLOOD BY AUTOMATED COUNT: 45.1 FL (ref 35–45)
ERYTHROCYTE [DISTWIDTH] IN BLOOD BY AUTOMATED COUNT: 46.2 FL (ref 35–45)
ETHYL ALCOHOL, SERUM: < 0.01 %
GLUCOSE BLD-MCNC: 133 MG/DL (ref 70–108)
GLUCOSE BLD-MCNC: 169 MG/DL (ref 70–108)
HCT VFR BLD CALC: 48.6 % (ref 42–52)
HCT VFR BLD CALC: 49.9 % (ref 42–52)
HEMOGLOBIN: 16.1 GM/DL (ref 14–18)
HEMOGLOBIN: 16.6 GM/DL (ref 14–18)
IMMATURE GRANS (ABS): 0.05 THOU/MM3 (ref 0–0.07)
IMMATURE GRANS (ABS): 0.06 THOU/MM3 (ref 0–0.07)
IMMATURE GRANULOCYTES: 0.6 %
IMMATURE GRANULOCYTES: 0.6 %
LIPASE: 40.5 U/L (ref 5.6–51.3)
LYMPHOCYTES # BLD: 15.7 %
LYMPHOCYTES # BLD: 15.9 %
LYMPHOCYTES ABSOLUTE: 1.3 THOU/MM3 (ref 1–4.8)
LYMPHOCYTES ABSOLUTE: 1.7 THOU/MM3 (ref 1–4.8)
MCH RBC QN AUTO: 30.1 PG (ref 26–33)
MCH RBC QN AUTO: 30.6 PG (ref 26–33)
MCHC RBC AUTO-ENTMCNC: 33.1 GM/DL (ref 32.2–35.5)
MCHC RBC AUTO-ENTMCNC: 33.3 GM/DL (ref 32.2–35.5)
MCV RBC AUTO: 90.8 FL (ref 80–94)
MCV RBC AUTO: 92.1 FL (ref 80–94)
MONOCYTES # BLD: 6.7 %
MONOCYTES # BLD: 7.8 %
MONOCYTES ABSOLUTE: 0.5 THOU/MM3 (ref 0.4–1.3)
MONOCYTES ABSOLUTE: 0.8 THOU/MM3 (ref 0.4–1.3)
NUCLEATED RED BLOOD CELLS: 0 /100 WBC
NUCLEATED RED BLOOD CELLS: 0 /100 WBC
OSMOLALITY CALCULATION: 284.9 MOSMOL/KG (ref 275–300)
PLATELET # BLD: 226 THOU/MM3 (ref 130–400)
PLATELET # BLD: ABNORMAL THOU/MM3 (ref 130–400)
PLATELET ESTIMATE: ABNORMAL
PMV BLD AUTO: 10.6 FL (ref 9.4–12.4)
POTASSIUM REFLEX MAGNESIUM: 4.9 MEQ/L (ref 3.5–5.2)
PRO-BNP: 224.5 PG/ML (ref 0–900)
RBC # BLD: 5.35 MILL/MM3 (ref 4.7–6.1)
RBC # BLD: 5.42 MILL/MM3 (ref 4.7–6.1)
SARS-COV-2, NAAT: NOT DETECTED
SCAN OF BLOOD SMEAR: NORMAL
SEG NEUTROPHILS: 73.2 %
SEG NEUTROPHILS: 75.1 %
SEGMENTED NEUTROPHILS ABSOLUTE COUNT: 6.1 THOU/MM3 (ref 1.8–7.7)
SEGMENTED NEUTROPHILS ABSOLUTE COUNT: 7.8 THOU/MM3 (ref 1.8–7.7)
SODIUM BLD-SCNC: 140 MEQ/L (ref 135–145)
TOTAL PROTEIN: 6.7 G/DL (ref 6.1–8)
TROPONIN T: 0.01 NG/ML
WBC # BLD: 10.7 THOU/MM3 (ref 4.8–10.8)
WBC # BLD: 8.1 THOU/MM3 (ref 4.8–10.8)

## 2021-09-06 PROCEDURE — 80076 HEPATIC FUNCTION PANEL: CPT

## 2021-09-06 PROCEDURE — 74176 CT ABD & PELVIS W/O CONTRAST: CPT

## 2021-09-06 PROCEDURE — 85379 FIBRIN DEGRADATION QUANT: CPT

## 2021-09-06 PROCEDURE — 83690 ASSAY OF LIPASE: CPT

## 2021-09-06 PROCEDURE — G0378 HOSPITAL OBSERVATION PER HR: HCPCS

## 2021-09-06 PROCEDURE — 99284 EMERGENCY DEPT VISIT MOD MDM: CPT

## 2021-09-06 PROCEDURE — 82948 REAGENT STRIP/BLOOD GLUCOSE: CPT

## 2021-09-06 PROCEDURE — 87635 SARS-COV-2 COVID-19 AMP PRB: CPT

## 2021-09-06 PROCEDURE — 93005 ELECTROCARDIOGRAM TRACING: CPT | Performed by: STUDENT IN AN ORGANIZED HEALTH CARE EDUCATION/TRAINING PROGRAM

## 2021-09-06 PROCEDURE — 80048 BASIC METABOLIC PNL TOTAL CA: CPT

## 2021-09-06 PROCEDURE — 83880 ASSAY OF NATRIURETIC PEPTIDE: CPT

## 2021-09-06 PROCEDURE — 71045 X-RAY EXAM CHEST 1 VIEW: CPT

## 2021-09-06 PROCEDURE — 36415 COLL VENOUS BLD VENIPUNCTURE: CPT

## 2021-09-06 PROCEDURE — 82077 ASSAY SPEC XCP UR&BREATH IA: CPT

## 2021-09-06 PROCEDURE — 85025 COMPLETE CBC W/AUTO DIFF WBC: CPT

## 2021-09-06 PROCEDURE — 84484 ASSAY OF TROPONIN QUANT: CPT

## 2021-09-06 PROCEDURE — 6370000000 HC RX 637 (ALT 250 FOR IP): Performed by: STUDENT IN AN ORGANIZED HEALTH CARE EDUCATION/TRAINING PROGRAM

## 2021-09-06 PROCEDURE — 2580000003 HC RX 258: Performed by: PHYSICIAN ASSISTANT

## 2021-09-06 PROCEDURE — 83036 HEMOGLOBIN GLYCOSYLATED A1C: CPT

## 2021-09-06 PROCEDURE — 99220 PR INITIAL OBSERVATION CARE/DAY 70 MINUTES: CPT | Performed by: PHYSICIAN ASSISTANT

## 2021-09-06 PROCEDURE — 6370000000 HC RX 637 (ALT 250 FOR IP): Performed by: PHYSICIAN ASSISTANT

## 2021-09-06 RX ORDER — NITROGLYCERIN 0.4 MG/1
0.4 TABLET SUBLINGUAL EVERY 5 MIN PRN
Status: DISCONTINUED | OUTPATIENT
Start: 2021-09-06 | End: 2021-09-09 | Stop reason: HOSPADM

## 2021-09-06 RX ORDER — BUSPIRONE HYDROCHLORIDE 10 MG/1
10 TABLET ORAL 2 TIMES DAILY
Status: DISCONTINUED | OUTPATIENT
Start: 2021-09-06 | End: 2021-09-09 | Stop reason: HOSPADM

## 2021-09-06 RX ORDER — SODIUM CHLORIDE 0.9 % (FLUSH) 0.9 %
5-40 SYRINGE (ML) INJECTION EVERY 12 HOURS SCHEDULED
Status: DISCONTINUED | OUTPATIENT
Start: 2021-09-06 | End: 2021-09-09 | Stop reason: HOSPADM

## 2021-09-06 RX ORDER — PANTOPRAZOLE SODIUM 40 MG/1
40 TABLET, DELAYED RELEASE ORAL DAILY
Status: DISCONTINUED | OUTPATIENT
Start: 2021-09-06 | End: 2021-09-09 | Stop reason: HOSPADM

## 2021-09-06 RX ORDER — FUROSEMIDE 40 MG/1
40 TABLET ORAL DAILY
Status: DISCONTINUED | OUTPATIENT
Start: 2021-09-06 | End: 2021-09-09 | Stop reason: HOSPADM

## 2021-09-06 RX ORDER — POLYETHYLENE GLYCOL 3350 17 G/17G
17 POWDER, FOR SOLUTION ORAL DAILY PRN
Status: DISCONTINUED | OUTPATIENT
Start: 2021-09-06 | End: 2021-09-09 | Stop reason: HOSPADM

## 2021-09-06 RX ORDER — ACETAMINOPHEN 650 MG/1
650 SUPPOSITORY RECTAL EVERY 6 HOURS PRN
Status: DISCONTINUED | OUTPATIENT
Start: 2021-09-06 | End: 2021-09-09 | Stop reason: HOSPADM

## 2021-09-06 RX ORDER — ATORVASTATIN CALCIUM 40 MG/1
40 TABLET, FILM COATED ORAL NIGHTLY
Status: DISCONTINUED | OUTPATIENT
Start: 2021-09-06 | End: 2021-09-09 | Stop reason: HOSPADM

## 2021-09-06 RX ORDER — ONDANSETRON 2 MG/ML
4 INJECTION INTRAMUSCULAR; INTRAVENOUS EVERY 6 HOURS PRN
Status: DISCONTINUED | OUTPATIENT
Start: 2021-09-06 | End: 2021-09-09 | Stop reason: HOSPADM

## 2021-09-06 RX ORDER — ALLOPURINOL 300 MG/1
300 TABLET ORAL DAILY
Status: DISCONTINUED | OUTPATIENT
Start: 2021-09-06 | End: 2021-09-09 | Stop reason: HOSPADM

## 2021-09-06 RX ORDER — SODIUM CHLORIDE 0.9 % (FLUSH) 0.9 %
5-40 SYRINGE (ML) INJECTION PRN
Status: DISCONTINUED | OUTPATIENT
Start: 2021-09-06 | End: 2021-09-09 | Stop reason: HOSPADM

## 2021-09-06 RX ORDER — MORPHINE SULFATE 2 MG/ML
2 INJECTION, SOLUTION INTRAMUSCULAR; INTRAVENOUS EVERY 4 HOURS PRN
Status: DISCONTINUED | OUTPATIENT
Start: 2021-09-06 | End: 2021-09-09 | Stop reason: HOSPADM

## 2021-09-06 RX ORDER — M-VIT,TX,IRON,MINS/CALC/FOLIC 27MG-0.4MG
1 TABLET ORAL DAILY
Status: DISCONTINUED | OUTPATIENT
Start: 2021-09-06 | End: 2021-09-09 | Stop reason: HOSPADM

## 2021-09-06 RX ORDER — DEXTROSE MONOHYDRATE 25 G/50ML
12.5 INJECTION, SOLUTION INTRAVENOUS PRN
Status: DISCONTINUED | OUTPATIENT
Start: 2021-09-06 | End: 2021-09-09 | Stop reason: HOSPADM

## 2021-09-06 RX ORDER — INSULIN GLARGINE 100 [IU]/ML
10 INJECTION, SOLUTION SUBCUTANEOUS NIGHTLY
Status: DISCONTINUED | OUTPATIENT
Start: 2021-09-06 | End: 2021-09-09 | Stop reason: HOSPADM

## 2021-09-06 RX ORDER — POTASSIUM CHLORIDE 7.45 MG/ML
10 INJECTION INTRAVENOUS PRN
Status: DISCONTINUED | OUTPATIENT
Start: 2021-09-06 | End: 2021-09-09 | Stop reason: HOSPADM

## 2021-09-06 RX ORDER — FOLIC ACID 1 MG/1
1 TABLET ORAL DAILY
Status: DISCONTINUED | OUTPATIENT
Start: 2021-09-06 | End: 2021-09-09 | Stop reason: HOSPADM

## 2021-09-06 RX ORDER — DEXTROSE MONOHYDRATE 50 MG/ML
100 INJECTION, SOLUTION INTRAVENOUS PRN
Status: DISCONTINUED | OUTPATIENT
Start: 2021-09-06 | End: 2021-09-09 | Stop reason: HOSPADM

## 2021-09-06 RX ORDER — ACETAMINOPHEN 325 MG/1
650 TABLET ORAL EVERY 6 HOURS PRN
Status: DISCONTINUED | OUTPATIENT
Start: 2021-09-06 | End: 2021-09-09 | Stop reason: HOSPADM

## 2021-09-06 RX ORDER — TAMSULOSIN HYDROCHLORIDE 0.4 MG/1
0.4 CAPSULE ORAL NIGHTLY
Status: DISCONTINUED | OUTPATIENT
Start: 2021-09-06 | End: 2021-09-09 | Stop reason: HOSPADM

## 2021-09-06 RX ORDER — ASCORBIC ACID 500 MG
1000 TABLET ORAL DAILY
Status: DISCONTINUED | OUTPATIENT
Start: 2021-09-06 | End: 2021-09-09 | Stop reason: HOSPADM

## 2021-09-06 RX ORDER — NICOTINE POLACRILEX 4 MG
15 LOZENGE BUCCAL PRN
Status: DISCONTINUED | OUTPATIENT
Start: 2021-09-06 | End: 2021-09-09 | Stop reason: HOSPADM

## 2021-09-06 RX ORDER — HYDRALAZINE HYDROCHLORIDE 50 MG/1
50 TABLET, FILM COATED ORAL 2 TIMES DAILY
Status: DISCONTINUED | OUTPATIENT
Start: 2021-09-06 | End: 2021-09-09 | Stop reason: HOSPADM

## 2021-09-06 RX ORDER — SODIUM CHLORIDE 9 MG/ML
25 INJECTION, SOLUTION INTRAVENOUS PRN
Status: DISCONTINUED | OUTPATIENT
Start: 2021-09-06 | End: 2021-09-09 | Stop reason: HOSPADM

## 2021-09-06 RX ORDER — CITALOPRAM 20 MG/1
30 TABLET ORAL DAILY
Status: DISCONTINUED | OUTPATIENT
Start: 2021-09-06 | End: 2021-09-09 | Stop reason: HOSPADM

## 2021-09-06 RX ORDER — ALBUTEROL SULFATE 90 UG/1
2 AEROSOL, METERED RESPIRATORY (INHALATION) EVERY 6 HOURS PRN
Status: DISCONTINUED | OUTPATIENT
Start: 2021-09-06 | End: 2021-09-09 | Stop reason: HOSPADM

## 2021-09-06 RX ORDER — ASPIRIN 81 MG/1
81 TABLET, CHEWABLE ORAL DAILY
Status: DISCONTINUED | OUTPATIENT
Start: 2021-09-07 | End: 2021-09-09 | Stop reason: HOSPADM

## 2021-09-06 RX ORDER — ACETAMINOPHEN 500 MG
1000 TABLET ORAL ONCE
Status: COMPLETED | OUTPATIENT
Start: 2021-09-06 | End: 2021-09-06

## 2021-09-06 RX ORDER — MAGNESIUM SULFATE IN WATER 40 MG/ML
2000 INJECTION, SOLUTION INTRAVENOUS PRN
Status: DISCONTINUED | OUTPATIENT
Start: 2021-09-06 | End: 2021-09-09 | Stop reason: HOSPADM

## 2021-09-06 RX ORDER — ARIPIPRAZOLE 15 MG/1
15 TABLET ORAL DAILY
Status: DISCONTINUED | OUTPATIENT
Start: 2021-09-06 | End: 2021-09-09 | Stop reason: HOSPADM

## 2021-09-06 RX ORDER — POTASSIUM CHLORIDE 20 MEQ/1
20 TABLET, EXTENDED RELEASE ORAL DAILY
Status: DISCONTINUED | OUTPATIENT
Start: 2021-09-06 | End: 2021-09-09 | Stop reason: HOSPADM

## 2021-09-06 RX ORDER — ONDANSETRON 4 MG/1
4 TABLET, ORALLY DISINTEGRATING ORAL EVERY 8 HOURS PRN
Status: DISCONTINUED | OUTPATIENT
Start: 2021-09-06 | End: 2021-09-09 | Stop reason: HOSPADM

## 2021-09-06 RX ORDER — ASPIRIN 81 MG/1
324 TABLET, CHEWABLE ORAL ONCE
Status: COMPLETED | OUTPATIENT
Start: 2021-09-06 | End: 2021-09-06

## 2021-09-06 RX ORDER — FERROUS SULFATE 325(65) MG
325 TABLET ORAL 2 TIMES DAILY WITH MEALS
Status: DISCONTINUED | OUTPATIENT
Start: 2021-09-06 | End: 2021-09-09 | Stop reason: HOSPADM

## 2021-09-06 RX ORDER — POTASSIUM CHLORIDE 20 MEQ/1
40 TABLET, EXTENDED RELEASE ORAL PRN
Status: DISCONTINUED | OUTPATIENT
Start: 2021-09-06 | End: 2021-09-09 | Stop reason: HOSPADM

## 2021-09-06 RX ADMIN — HYDRALAZINE HYDROCHLORIDE 50 MG: 50 TABLET, FILM COATED ORAL at 21:47

## 2021-09-06 RX ADMIN — TAMSULOSIN HYDROCHLORIDE 0.4 MG: 0.4 CAPSULE ORAL at 21:47

## 2021-09-06 RX ADMIN — ALLOPURINOL 300 MG: 300 TABLET ORAL at 21:47

## 2021-09-06 RX ADMIN — FERROUS SULFATE TAB 325 MG (65 MG ELEMENTAL FE) 325 MG: 325 (65 FE) TAB at 21:47

## 2021-09-06 RX ADMIN — FUROSEMIDE 40 MG: 40 TABLET ORAL at 21:47

## 2021-09-06 RX ADMIN — ACETAMINOPHEN 650 MG: 325 TABLET ORAL at 21:07

## 2021-09-06 RX ADMIN — INSULIN GLARGINE 10 UNITS: 100 INJECTION, SOLUTION SUBCUTANEOUS at 22:51

## 2021-09-06 RX ADMIN — ASPIRIN 324 MG: 81 TABLET, CHEWABLE ORAL at 14:11

## 2021-09-06 RX ADMIN — ARIPIPRAZOLE 15 MG: 15 TABLET ORAL at 21:47

## 2021-09-06 RX ADMIN — OXYCODONE HYDROCHLORIDE AND ACETAMINOPHEN 1000 MG: 500 TABLET ORAL at 21:47

## 2021-09-06 RX ADMIN — PANTOPRAZOLE SODIUM 40 MG: 40 TABLET, DELAYED RELEASE ORAL at 21:47

## 2021-09-06 RX ADMIN — Medication 1 TABLET: at 21:47

## 2021-09-06 RX ADMIN — POTASSIUM CHLORIDE 20 MEQ: 1500 TABLET, EXTENDED RELEASE ORAL at 21:46

## 2021-09-06 RX ADMIN — SODIUM CHLORIDE, PRESERVATIVE FREE 10 ML: 5 INJECTION INTRAVENOUS at 21:51

## 2021-09-06 RX ADMIN — BUSPIRONE HYDROCHLORIDE 10 MG: 10 TABLET ORAL at 21:47

## 2021-09-06 RX ADMIN — NITROGLYCERIN 0.4 MG: 0.4 TABLET, ORALLY DISINTEGRATING SUBLINGUAL at 14:11

## 2021-09-06 RX ADMIN — FOLIC ACID 1 MG: 1 TABLET ORAL at 21:47

## 2021-09-06 RX ADMIN — ATORVASTATIN CALCIUM 40 MG: 40 TABLET, FILM COATED ORAL at 21:47

## 2021-09-06 RX ADMIN — CITALOPRAM 30 MG: 20 TABLET, FILM COATED ORAL at 21:48

## 2021-09-06 RX ADMIN — ACETAMINOPHEN 1000 MG: 500 TABLET, FILM COATED ORAL at 18:46

## 2021-09-06 ASSESSMENT — ENCOUNTER SYMPTOMS
TROUBLE SWALLOWING: 0
CHEST TIGHTNESS: 1
COLOR CHANGE: 0
CONSTIPATION: 0
VOMITING: 0
DIARRHEA: 0
NAUSEA: 1
ABDOMINAL PAIN: 1
SHORTNESS OF BREATH: 1
BACK PAIN: 0

## 2021-09-06 ASSESSMENT — PAIN SCALES - GENERAL
PAINLEVEL_OUTOF10: 5
PAINLEVEL_OUTOF10: 5
PAINLEVEL_OUTOF10: 7
PAINLEVEL_OUTOF10: 5
PAINLEVEL_OUTOF10: 4
PAINLEVEL_OUTOF10: 6

## 2021-09-06 ASSESSMENT — PAIN DESCRIPTION - DESCRIPTORS
DESCRIPTORS: ACHING
DESCRIPTORS: ACHING

## 2021-09-06 ASSESSMENT — PAIN DESCRIPTION - LOCATION
LOCATION: CHEST
LOCATION: OTHER (COMMENT)
LOCATION: OTHER (COMMENT)

## 2021-09-06 ASSESSMENT — HEART SCORE: ECG: 0

## 2021-09-06 ASSESSMENT — PAIN DESCRIPTION - ONSET
ONSET: ON-GOING
ONSET: GRADUAL

## 2021-09-06 ASSESSMENT — PAIN - FUNCTIONAL ASSESSMENT
PAIN_FUNCTIONAL_ASSESSMENT: ACTIVITIES ARE NOT PREVENTED
PAIN_FUNCTIONAL_ASSESSMENT: ACTIVITIES ARE NOT PREVENTED

## 2021-09-06 ASSESSMENT — PAIN DESCRIPTION - PAIN TYPE
TYPE: ACUTE PAIN

## 2021-09-06 ASSESSMENT — PAIN DESCRIPTION - ORIENTATION
ORIENTATION: RIGHT;LEFT
ORIENTATION: RIGHT;LEFT

## 2021-09-06 ASSESSMENT — PAIN DESCRIPTION - PROGRESSION
CLINICAL_PROGRESSION: GRADUALLY WORSENING
CLINICAL_PROGRESSION: GRADUALLY WORSENING

## 2021-09-06 ASSESSMENT — PAIN DESCRIPTION - FREQUENCY
FREQUENCY: INTERMITTENT
FREQUENCY: INTERMITTENT

## 2021-09-06 NOTE — ED NOTES
Pt resting in bed watching television, wife at bedside. Call light in reach.      Yumiko Rivera RN  09/06/21 [id number]

## 2021-09-06 NOTE — ED PROVIDER NOTES
[facility] ENCOUNTER          Pt Name: Ela Feliciano  MRN: [id number]  [place] 1970  Date of evaluation: 9/6/2021  Treating Resident Physician: Chaitanya Cobb MD  Supervising Physician: Paddy Maxwell, [address] [place] [address]       Chief Complaint   Patient presents with    Shortness of Breath    Chest Pain     History obtained from chart review and the patient. HISTORY OF PRESENT ILLNESS    Ela Feliciano is a 44 y.o. male who presents to the emergency department for evaluation of shortness of breath and chest pain. Mr. Johnny Fields starting having shortness of breath at around 0930 this morning, he had to sit down to catch his breath. This progressed with headache, chest pain, and epigastric abdominal pain. Currently still having chest pain, has not taken anything for the pain. He also has nausea. Patient says it feels like a concrete block is siting on his chest.    He was admitted last fall for 42 days for COVID-19 pneumonia and required intubation. He is not home oxygen, but has obstructive sleep apnea and is working on getting a CPAP for nighttime use. The patient has no other acute complaints at this time. REVIEW OF SYSTEMS   Review of Systems   Constitutional: Negative for activity change, appetite change, chills and fever. HENT: Negative for trouble swallowing. Respiratory: Positive for chest tightness and shortness of breath. Cardiovascular: Positive for chest pain and leg swelling. Negative for palpitations. Gastrointestinal: Positive for abdominal pain and nausea. Negative for constipation, diarrhea and vomiting. Genitourinary: Negative for dysuria. Musculoskeletal: Negative for back pain. Skin: Negative for color change, pallor and rash. Neurological: Positive for headaches. Negative for dizziness, tremors, seizures, weakness and light-headedness. Psychiatric/Behavioral: Negative for confusion.    All Disp: 30 tablet, Rfl: 2    apixaban (ELIQUIS) 5 MG TABS tablet, Take 10mg by mouth two times daily for 6 days followed by 5mg by mouth two times daily, Disp: 60 tablet, Rfl: 1    glycopyrrolate-formoterol (BEVESPI) 9-4.8 MCG/ACT AERO, Inhale 2 puffs into the lungs 2 times daily, Disp: 10.7 g, Rfl: 1    ferrous sulfate (IRON 325) 325 (65 Fe) MG tablet, Take 1 tablet by mouth 2 times daily (with meals), Disp: 30 tablet, Rfl: 3    albuterol sulfate HFA (PROVENTIL HFA) 108 (90 Base) MCG/ACT inhaler, Inhale 2 puffs into the lungs every 6 hours as needed for Wheezing, Disp: 1 Inhaler, Rfl: 3    gabapentin (NEURONTIN) 400 MG capsule, Take 1 capsule by mouth 2 times daily for 180 days. , Disp: 90 capsule, Rfl: 3    vitamin C (ASCORBIC ACID) 500 MG tablet, Take 2 tablets by mouth daily, Disp: 30 tablet, Rfl: 0    CHOLECALCIFEROL PO, Take 2,000 Units by mouth daily, Disp: , Rfl:     atorvastatin (LIPITOR) 40 MG tablet, Take 40 mg by mouth nightly, Disp: , Rfl:     acetaminophen (TYLENOL) 325 MG tablet, Take 650 mg by mouth every 4 hours as needed for Pain, Disp: , Rfl:     benzocaine (BABY ORAJEL) 7.5 % oral gel, Take by mouth 4 times daily as needed for Pain (mouth pain) Take by mouth 3 times daily. , Disp: , Rfl:     guaiFENesin (ROBITUSSIN) 100 MG/5ML syrup, Take 200 mg by mouth every 4 hours as needed for Cough, Disp: , Rfl:     busPIRone (BUSPAR) 10 MG tablet, Take 10 mg by mouth 2 times daily , Disp: , Rfl:     Trolamine Salicylate (ASPERCREME) 10 % LOTN, Apply topically as needed for Pain, Disp: 1 Bottle, Rfl: 0    pantoprazole (PROTONIX) 40 MG tablet, Take 1 tablet by mouth daily, Disp: 5 tablet, Rfl: 0    allopurinol (ZYLOPRIM) 300 MG tablet, Take 1 tablet by mouth daily, Disp: 90 tablet, Rfl: 0    lidocaine (XYLOCAINE) 5 % ointment, Apply topically as needed to painful areas on lower back, hips, knees, and feet, Disp: 1 Tube, Rfl: 2    hydrALAZINE (APRESOLINE) 50 MG tablet, Take 50 mg by mouth 2 times daily , Disp: , Rfl:     Multiple Vitamins-Minerals (THERAPEUTIC MULTIVITAMIN-MINERALS) tablet, Take 1 tablet by mouth daily, Disp: , Rfl:     Diaper Rash Products (PINXAV) OINT, Apply topically, Disp: , Rfl:     Probiotic Product (SOBIA-BID PROBIOTIC PO), Take 1 tablet by mouth daily , Disp: , Rfl:     ondansetron (ZOFRAN) 4 MG tablet, Take 4 mg by mouth every 8 hours as needed for Nausea or Vomiting, Disp: , Rfl:     tamsulosin (FLOMAX) 0.4 MG capsule, Take 0.4 mg by mouth nightly , Disp: , Rfl:     folic acid (FOLVITE) 1 MG tablet, Take 1 mg by mouth daily, Disp: , Rfl:     furosemide (LASIX) 40 MG tablet, Take 40 mg by mouth daily, Disp: , Rfl:     insulin lispro (HUMALOG) 100 UNIT/ML injection vial, Inject into the skin 3 times daily (before meals) Per scale: 151-200=1 unit, 201-250=2 units, 251-300-3 units, 301-350=4 units, 351-400=5 units. , Disp: , Rfl:     sitaGLIPtan-metformin (JANUMET)  MG per tablet, Take 1 tablet by mouth 2 times daily (with meals), Disp: , Rfl:     insulin glargine (LANTUS) 100 UNIT/ML injection vial, Inject 10 Units into the skin nightly , Disp: , Rfl:     senna (SENOKOT) 8.6 MG tablet, Take 1 tablet by mouth 2 times daily, Disp: , Rfl:     ARIPiprazole (ABILIFY PO), Take 15 mg by mouth daily , Disp: , Rfl:     Citalopram Hydrobromide (CELEXA PO), Take 30 mg by mouth daily From [employer] , Disp: , Rfl:     Blood Glucose Monitoring Suppl (FREESTYLE LITE) ARTIE, Patient needs all supplies for qd testing.  DX: 250.00, Disp: 1 Device, Rfl: 11      SOCIAL HISTORY     Social History     Social History Narrative    Not on file     Social History     Tobacco Use    Smoking status: Never Smoker    Smokeless tobacco: Never Used   Vaping Use    Vaping Use: Never used   Substance Use Topics    Alcohol use: Not Currently     Comment: hx of abuse    Drug use: No         ALLERGIES     Allergies   Allergen Reactions    Penicillins      Tolerated Zosyn 9/24/2020         FAMILY HISTORY     Family History   Problem Relation Age of Onset    Heart Disease Mother         MI    Cancer Paternal Aunt         lung         PREVIOUS RECORDS   Previous records reviewed: prior Family Medicine, Cardiology, Pulmonary visit notes reviewed. .        PHYSICAL EXAM     ED Triage Vitals   BP Temp Temp Source Pulse Resp SpO2 Height Weight   09/06/21 1242 09/06/21 1242 09/06/21 1242 09/06/21 1242 09/06/21 1242 09/06/21 1242 09/06/21 1344 09/06/21 1344   (!) 178/116 98.4 °F (36.9 °C) Oral 64 22 95 % 5' 7\" (1.702 m) 285 lb (129.3 kg)     Initial vital signs and nursing assessment reviewed and abnormal from elevated blood pressure. Body mass index is 44.64 kg/m². Pulsoximetry is normal per my interpretation. Additional Vital Signs:  Vitals:    09/06/21 1850   BP: (!) 191/118   Pulse: 72   Resp: 20   Temp:    SpO2: 97%       Physical Exam  Vitals and nursing note reviewed. Constitutional:       Appearance: He is well-developed. HENT:      Head: Normocephalic. Mouth/Throat:      Mouth: Mucous membranes are moist.   Cardiovascular:      Rate and Rhythm: Normal rate and regular rhythm. No extrasystoles are present. Pulses: Normal pulses. No decreased pulses. Heart sounds: Normal heart sounds. No murmur heard. No friction rub. No gallop. Pulmonary:      Effort: Pulmonary effort is normal.      Breath sounds: Normal breath sounds. No decreased breath sounds, wheezing, rhonchi or rales. Chest:      Chest wall: Tenderness present. Abdominal:      Palpations: Abdomen is soft. Tenderness: There is abdominal tenderness. Musculoskeletal:      Right lower leg: No tenderness. Edema present. Left lower leg: No tenderness. Edema present. Skin:     General: Skin is warm and dry. Capillary Refill: Capillary refill takes less than 2 seconds. Neurological:      Mental Status: He is alert and oriented to person, place, and time.    Psychiatric:         Mood and Affect: Mood normal.         Behavior: Behavior normal.             MEDICAL DECISION MAKING   Initial Assessment:   1. Acute coronary syndrome (less likely given pain worse on palpation)  2. Aortic dissection  3. Acute on chronic heart failure  4. Sarcoidosis  5. Pneumonia  6. COVID-19      Plan:    ECG then continous monitor   IV   CBC, BMP, LFTs, Trop, BNP, Ethanol, Lipase   COVID-19 Rapid   Chest X-Ray   CT Abodmen/Pelvis without contrast   Aspirin   Nitroglycerin    Summary:  Patient's history concerning for multiple differentials. Patient found to have elevated troponin, chest pain slightly better with nitroglycerin tab. This supports ACS as primary diagnosis. Patient found to have calcifications in chest, abdomen, pelvis, this could be consistent with patient's known diagnosis of sarcoidosis. Patient will be admitted to the Hospitalist service for further evaluation of chest pain and elevated troponin.         ED RESULTS   Laboratory results:  Labs Reviewed   BASIC METABOLIC PANEL W/ REFLEX TO MG FOR LOW K - Abnormal; Notable for the following components:       Result Value    Glucose 169 (*)     All other components within normal limits   CBC WITH AUTO DIFFERENTIAL - Abnormal; Notable for the following components:    RDW-SD 46.2 (*)     All other components within normal limits   TROPONIN - Abnormal; Notable for the following components:    Troponin T 0.011 (*)     All other components within normal limits   GLOMERULAR FILTRATION RATE, ESTIMATED - Abnormal; Notable for the following components:    Est, Glom Filt Rate 85 (*)     All other components within normal limits   HEPATIC FUNCTION PANEL - Abnormal; Notable for the following components:    Alkaline Phosphatase 191 (*)     All other components within normal limits   CBC WITH AUTO DIFFERENTIAL - Abnormal; Notable for the following components:    RDW-SD 45.1 (*)     Segs Absolute 7.8 (*)     All other components within normal limits   COVID-19, RAPID   BRAIN NATRIURETIC PEPTIDE   ANION GAP   OSMOLALITY   ETHANOL   LIPASE   SCAN OF BLOOD SMEAR       Radiologic studies results:  CT ABDOMEN PELVIS WO CONTRAST Additional Contrast? Radiologist Recommendation   Final Result   1. There are diffuse soft tissue calcifications involving the fascial planes of the visualized lower chest, abdomen and pelvis with more focal areas of larger coarse calcification demonstrated along the medial aspect of the right gluteus region as well    as along the anterolateral left upper quadrant. These calcifications are nonspecific but may be related to dermatomyositis. Recommend clinical correlation. 2. No other acute intra-abdominal or pelvic findings are otherwise seen. **This report has been created using voice recognition software. It may contain minor errors which are inherent in voice recognition technology. **      Final report electronically signed by Dr. Jovanna Phillip on 9/6/2021 4:05 PM      XR CHEST PORTABLE   Final Result   1. Mild patchy airspace disease at the medial right lung base may represent atelectasis or pneumonia. **This report has been created using voice recognition software. It may contain minor errors which are inherent in voice recognition technology. **      Final report electronically signed by Dr. Jovanna Phillip on 9/6/2021 2:11 PM          ED Medications administered this visit:   Medications   nitroGLYCERIN (NITROSTAT) SL tablet 0.4 mg (0.4 mg SubLINGual Given 9/6/21 1411)   aspirin chewable tablet 324 mg (324 mg Oral Given 9/6/21 1411)   acetaminophen (TYLENOL) tablet 1,000 mg (1,000 mg Oral Given 9/6/21 1846)         ED COURSE     ED Course as of Sep 06 1913   [facility] Sep 06, 2021   1411 Troponin T(!): 0.011 [SC]   [place] for Early Discharge in Acute Chest Pain    RESULT SUMMARY:  5 points  HEART Pathway Score    High risk  12-65% 30-day MACE    Cardiology consultation and admission recommended.  Further testing indicated. INPUTS:  History -> 1 = Moderately suspicious  EKG -> 0 = Normal  Age -> 1 = 45-64  Risk factors -> 2 = =3 risk factors or history of atherosclerotic disease  Initial troponin -> 1 = 1-3x normal limit      [SC]   1610 Results discussed with patient and need for admission given elevated troponin. Patient agrees to admission.    [SC]   [id number] Referral for admission sent to [facility] via [address] [place] [address] TextMessage.    [SC]      ED Course User Index  [SC] Nael Fitch MD     MEDICATION CHANGES     New Prescriptions    No medications on file         FINAL DISPOSITION     Final diagnoses:   Chest pain, unspecified type   Abdominal pain, unspecified abdominal location   Elevated troponin     Condition: condition: stable  Dispo: Admit to telemetry      This transcription was electronically signed. Parts of this transcriptions may have been dictated by use of voice recognition software and electronically transcribed, and parts may have been transcribed with the assistance of an ED scribe. The transcription may contain errors not detected in proofreading. Please refer to my supervising physician's documentation if my documentation differs.     Electronically Signed: Nahum Stafford MD, 09/06/21, 7:13 PM         Nael Fitch MD  Resident  09/06/21 7942

## 2021-09-06 NOTE — ED NOTES
ED nurse-to-nurse bedside report    Chief Complaint   Patient presents with    Shortness of Breath    Chest Pain      LOC: alert and orientated to name, place, date  Vital signs   Vitals:    09/06/21 1505 09/06/21 1601 09/06/21 1730 09/06/21 1850   BP: (!) 172/124 (!) 166/102 (!) 155/98 (!) 191/118   Pulse: 65 67 68 72   Resp: 22 21 21 20   Temp:       TempSrc:       SpO2: 96% 97% 96% 97%   Weight:       Height:          Pain:  4  Pain Interventions: tylenol  Pain Goal: 4  Oxygen: NA    Current needs required none   Telemetry: Yes  LDAs:   Peripheral IV 11/18/20 Right Antecubital (Active)     Continuous Infusions:   Mobility: Requires assistance * 2  Guzman Fall Risk Score: No flowsheet data found.   Fall Interventions: call light, side rails x2  Report given to: Jenifer Rivera RN  09/06/21 4726

## 2021-09-06 NOTE — ED NOTES
Pt resting in bed, admitting provider at bedside for evaluation.      Yumiko Rivera RN  09/06/21 9909

## 2021-09-06 NOTE — ED TRIAGE NOTES
Pt in through lobby. He states he woke up this morning with SOB and chest pain. He also has had a headache. He states chest pain is worse and he has some upper abdominal pain when taking a deep breath.

## 2021-09-06 NOTE — H&P
Hospitalist - History & Physical      Patient: Alba Mitchell    Unit/Bed:05/005A  YOB: 1970  MRN: [id number]   Acct: [de-identified]   PCP: SANDRA Lin CNP    Date of Service: Pt seen/examined on 09/06/21  and Admitted to Observation with expected LOS less than two midnights due to medical therapy. Chief Complaint:  Chest pain / SOB    Assessment and Plan:-  1. Atypical chest pain: centrally located chest heaviness, relief with nitro per ED. EKG showed NSR, initial trop 0.011, continue to trend. Tele monitor. Consider cardiac consultation if symptoms persist. Continue with ASA / statin. 2. Undifferentiated dyspnea: without hypoxia. Of note, pt chronically SOB per chart review. CXR showed mild patchy airspace disease at medial R lung base which could represent atelectasis or PNA. Afebrile, no leukocytosis. Low suspicion for PNA. BNP wnl, no rales on exam. Encourage incentive spirometer. 3. LLE edema: chronic, +2 pitting edema. Pt notes this is near baseline. BNP wnl. Pt on Lasix at home, continue and monitor. 4. Chronic HFpEF: no overt decompensation noted. ECHO with EF 60% from 09/2020. Strict I/Os, daily weights. On diuretic at home. Low Na diet. 5. Chronic low back pain: hx of undifferentiated axial spondyloarthritis. Fred Roxie Hx of dermatomyositis and inflammatory arthritis. Follows with rheumatology who per most recent visit dated 9/2/2021 planned to start anti-TNF vs IL17 pending TB gold. 6. IDDM II, uncontrolled: resume home Lantus, SSI. Hgb A1c noted at 9.9. ACCU. Carb control. Hypoglycemia protocol in place. 7. Essential HTN: BP elevated on admission, continue home meds and monitor to adjust as needed   8. Hx of DVT: in LLE while pt was admitted with COVID in 9/2020. Continue with Eliquis. No erythema, TTP.   9. Chronic L foot drop: follow up planned with Dr. Mikey Wheeler in Neuro as outpatient. PT/OT  10.  KIARA not on CPAP: pt states he has f/u at the end of this month to obtain a machine  11. Hx of gout: Allopurinol   12. CKD stage I: Cr stable, avoid nephrotoxic agents. 13. Severe obesity: BMI 44.64      History Of Present Illness:    Pt is a 43 yo male with PMH of AS, BPH, DM II, GERD, CHF, CKD, essential HTN, KIARA on BiPAP, Afib, HLD, and severe obesity presents to [facility] ED with chief complaint of chest pain and SOB that began this am around 0930. Hx obtained via pt and chart review. Pt states that upon waking this am he began to experience a \"heaviness\" centrally located in his chest. Pt reports that after a couple of minutes this feeling radiated down into his epigastric area and bilaterally across his upper quadrants where it felt that he was \"being kicked in the stomach\". Pt rated this pain at 6/10 in severity. Pt reports associated SOB, denies any nausea, vomiting, or diarrhea. Pt states that this lasted for \"a while\" but that it resolved. Pt states he did not try any medication or intervention. Pt notes that this pain in his stomach is intermittent and is worse upon taking a deep breath. Pt also reports swelling in his LLE that has been present since he was diagnosed with a blood clot in that leg and treated with [facility] which pt does not know if he is still taking. This swelling is unchanged and pt also notes that he has chronic weakness in his LLE since then and has to utilize a cane to walk. Pt is to f/u with Neurology (Dr. Scotty Villeda) for a some test that pt does not know the name of. Pt denies current CP at this time. In ED, pt was given ASA and a dose of nitro which improved pts symptoms. Pt admitted to [place] [address] for observation.        Past Medical History:        Diagnosis Date    Aortic stenosis, mild to moderate     BPH (benign prostatic hyperplasia)     Carpal tunnel syndrome on right     rt hand    Chronic gout     DM2 (diabetes mellitus, type 2) (HCC)     Dyslipidemia     GERD (gastroesophageal reflux disease)     Heart failure with preserved ejection fraction (HCC)     History of alcohol abuse     History of atrial fibrillation     History of osteomyelitis     Hx of R foot wound, osteomyelitis July 2018 requiring surgery and IV Vanco    Hypertension, essential     Hypogonadism, male     Major depression     Mood disorder ([facility] [address]     Morbid obesity ([facility] [address]     DURAN (nonalcoholic steatohepatitis)     KIARA treated with BiPAP     Vitamin D deficiency        Past Surgical History:        Procedure Laterality Date    COLONOSCOPY N/A 11/15/2020    COLONOSCOPY DIAGNOSTIC performed by Chris Louis MD at [facility] Right     2018, R foot osteomyelitis    HIP SURGERY Left 6/29/2020    bilateral hip steroid injection, Left HIP FIRST performed by Sarah Samuel MD at [facility] N/A 10/26/2020    SIGMOIDOSCOPY DIAGNOSTIC FLEXIBLE performed by Livan Granda MD at [facility]      as a baby    UPPER GASTROINTESTINAL ENDOSCOPY N/A 11/14/2020    EGD DIAGNOSTIC ONLY performed by Chris Louis MD at [facility]       Home Medications:   No current facility-administered medications on file prior to encounter. Current Outpatient Medications on File Prior to Encounter   Medication Sig Dispense Refill    HYDROcodone-acetaminophen (NORCO) 5-325 MG per tablet Take 1 tablet by mouth every 6 hours as needed for Pain for up to 30 days.  90 tablet 0    potassium chloride (KLOR-CON M) 20 MEQ extended release tablet Take 1 tablet by mouth daily 30 tablet 2    apixaban (ELIQUIS) 5 MG TABS tablet Take 10mg by mouth two times daily for 6 days followed by 5mg by mouth two times daily 60 tablet 1    glycopyrrolate-formoterol (BEVESPI) 9-4.8 MCG/ACT AERO Inhale 2 puffs into the lungs 2 times daily 10.7 g 1    ferrous sulfate (IRON 325) 325 (65 Fe) MG tablet Take 1 tablet by mouth 2 times daily (with meals) 30 tablet 3    albuterol sulfate HFA (PROVENTIL HFA) 108 (90 Base) MCG/ACT inhaler Inhale 2 puffs into the lungs every 6 hours as needed for Wheezing 1 Inhaler 3    gabapentin (NEURONTIN) 400 MG capsule Take 1 capsule by mouth 2 times daily for 180 days. 90 capsule 3    vitamin C (ASCORBIC ACID) 500 MG tablet Take 2 tablets by mouth daily 30 tablet 0    CHOLECALCIFEROL PO Take 2,000 Units by mouth daily      atorvastatin (LIPITOR) 40 MG tablet Take 40 mg by mouth nightly      acetaminophen (TYLENOL) 325 MG tablet Take 650 mg by mouth every 4 hours as needed for Pain      benzocaine (BABY ORAJEL) 7.5 % oral gel Take by mouth 4 times daily as needed for Pain (mouth pain) Take by mouth 3 times daily.       guaiFENesin (ROBITUSSIN) 100 MG/5ML syrup Take 200 mg by mouth every 4 hours as needed for Cough      busPIRone (BUSPAR) 10 MG tablet Take 10 mg by mouth 2 times daily       Trolamine Salicylate (ASPERCREME) 10 % LOTN Apply topically as needed for Pain 1 Bottle 0    pantoprazole (PROTONIX) 40 MG tablet Take 1 tablet by mouth daily 5 tablet 0    allopurinol (ZYLOPRIM) 300 MG tablet Take 1 tablet by mouth daily 90 tablet 0    lidocaine (XYLOCAINE) 5 % ointment Apply topically as needed to painful areas on lower back, hips, knees, and feet 1 Tube 2    hydrALAZINE (APRESOLINE) 50 MG tablet Take 50 mg by mouth 2 times daily       Multiple Vitamins-Minerals (THERAPEUTIC MULTIVITAMIN-MINERALS) tablet Take 1 tablet by mouth daily      Diaper Rash Products (PINXAV) OINT Apply topically      Probiotic Product (SOBIA-BID PROBIOTIC PO) Take 1 tablet by mouth daily       ondansetron (ZOFRAN) 4 MG tablet Take 4 mg by mouth every 8 hours as needed for Nausea or Vomiting      tamsulosin (FLOMAX) 0.4 MG capsule Take 0.4 mg by mouth nightly       folic acid (FOLVITE) 1 MG tablet Take 1 mg by mouth daily      furosemide (LASIX) 40 MG tablet Take 40 mg by mouth daily      insulin lispro (HUMALOG) 100 UNIT/ML injection vial Inject into the skin 3 times daily (before meals) Per scale: 151-200=1 unit, 201-250=2 units, 251-300-3 units, 301-350=4 units, 351-400=5 units.  sitaGLIPtan-metformin (JANUMET)  MG per tablet Take 1 tablet by mouth 2 times daily (with meals)      insulin glargine (LANTUS) 100 UNIT/ML injection vial Inject 10 Units into the skin nightly       senna (SENOKOT) 8.6 MG tablet Take 1 tablet by mouth 2 times daily      ARIPiprazole (ABILIFY PO) Take 15 mg by mouth daily       Citalopram Hydrobromide (CELEXA PO) Take 30 mg by mouth daily From [employer] [facility]       Blood Glucose Monitoring Suppl (FREESTYLE LITE) ARTIE Patient needs all supplies for qd testing. DX: 250.00 1 Device 11       Allergies:    Penicillins    Social History:    reports that he has never smoked. He has never used smokeless tobacco. He reports previous alcohol use. He reports that he does not use drugs. Family History:       Problem Relation Age of Onset    Heart Disease Mother         MI    Cancer Paternal Aunt         lung       Diet:  No diet orders on file    Review of systems:   Pertinent positives as noted in the HPI. All other systems reviewed and negative. PHYSICAL EXAM:  BP (!) 166/102   Pulse 67   Temp 98.4 °F (36.9 °C) (Oral)   Resp 21   Ht 5' 7\" (1.702 m)   Wt 285 lb (129.3 kg)   SpO2 97%   BMI 44.64 kg/m²   General appearance: severe obesity, no apparent distress, appears stated age and cooperative. HEENT: Normal cephalic, atraumatic without obvious deformity. Pupils equal, round, and reactive to light. Extra ocular muscles intact. Conjunctivae/corneas clear. Neck: Supple, with full range of motion. No jugular venous distention. Trachea midline. Respiratory:  Normal respiratory effort. Diminished to auscultation, bilaterally without Rales/Wheezes/Rhonchi. Cardiovascular: Regular rate and rhythm with normal S1/S2 without murmurs, rubs or gallops. Abdomen: Soft, non-tender, non-distended with normal bowel sounds.   Musculoskeletal:  No clubbing, cyanosis, +2 pitting PORTABLE   Final Result   1. Mild patchy airspace disease at the medial right lung base may represent atelectasis or pneumonia. **This report has been created using voice recognition software. It may contain minor errors which are inherent in voice recognition technology. **      Final report electronically signed by Dr. Caroline Ashley on 9/6/2021 2:11 PM        CT ABDOMEN PELVIS WO CONTRAST Additional Contrast? Radiologist Recommendation    Result Date: 9/6/2021  PROCEDURE: CT ABDOMEN PELVIS WO CONTRAST CLINICAL INFORMATION: Chest and abdominal pain starting this morning, intra-abdominal cause COMPARISON: 11/7/2020 TECHNIQUE:  Axial CT images were obtained through the abdomen and pelvis without contrast Coronal and sagittal reformatted images were rendered. All CT scans at this facility use dose modulation, iterative reconstruction, and/or weight-based dosing when appropriate to reduce radiation dose to as low as reasonably achievable. FINDINGS: Limited evaluation of the lung bases demonstrates mild dependent bibasilar atelectasis. The liver, gallbladder, spleen, pancreas and adrenal glands appear normal. The kidneys demonstrate no evidence of hydronephrosis or hydroureter. No nephrolithiasis or ureterolithiasis is seen. The urinary bladder appears normal. There is no evidence of bowel obstruction. There is no free air or free fluid. There are diffuse soft tissue calcifications involving the fascial planes of the visualized lower chest, abdomen and pelvis with more focal areas of larger coarse calcification demonstrated along the medial aspect of the right gluteus region as well as along the anterolateral left upper quadrant. These calcifications are nonspecific but may be related to dermatomyositis. Recommend clinical correlation. Marked degenerative changes are demonstrated involving the bilateral right greater than left hip joints.  There is also bilateral SI joint arthrosis and marked lower lumbar facet arthrosis. 1. There are diffuse soft tissue calcifications involving the fascial planes of the visualized lower chest, abdomen and pelvis with more focal areas of larger coarse calcification demonstrated along the medial aspect of the right gluteus region as well as along the anterolateral left upper quadrant. These calcifications are nonspecific but may be related to dermatomyositis. Recommend clinical correlation. 2. No other acute intra-abdominal or pelvic findings are otherwise seen. **This report has been created using voice recognition software. It may contain minor errors which are inherent in voice recognition technology. ** Final report electronically signed by Dr. Aranza Odonnell on 9/6/2021 4:05 PM    XR CHEST PORTABLE    Result Date: 9/6/2021  PROCEDURE: XR CHEST PORTABLE CLINICAL INFORMATION: Chest Pain COMPARISON: 11/13/2020 TECHNIQUE:  AP mobile chest  FINDINGS: The heart size is within normal limits. Patchy airspace disease at the medial right lung base is demonstrated which may represent atelectasis or pneumonia. No pleural effusion or pneumothorax is seen. No acute osseous findings are seen. 1. Mild patchy airspace disease at the medial right lung base may represent atelectasis or pneumonia. **This report has been created using voice recognition software. It may contain minor errors which are inherent in voice recognition technology. ** Final report electronically signed by Dr. Aranza Odonnell on 9/6/2021 2:11 PM        EKG: NSR, left axis deviation    Electronically signed by Annie Rodríguez PA-C on 9/6/2021 at 4:30 PM

## 2021-09-07 ENCOUNTER — APPOINTMENT (OUTPATIENT)
Dept: NON INVASIVE DIAGNOSTICS | Age: 51
End: 2021-09-07
Payer: MEDICARE

## 2021-09-07 LAB
ANION GAP SERPL CALCULATED.3IONS-SCNC: 11 MEQ/L (ref 8–16)
AVERAGE GLUCOSE: 159 MG/DL (ref 70–126)
BUN BLDV-MCNC: 16 MG/DL (ref 7–22)
CALCIUM SERPL-MCNC: 9.2 MG/DL (ref 8.5–10.5)
CHLORIDE BLD-SCNC: 100 MEQ/L (ref 98–111)
CO2: 25 MEQ/L (ref 23–33)
CREAT SERPL-MCNC: 1 MG/DL (ref 0.4–1.2)
EKG ATRIAL RATE: 66 BPM
EKG ATRIAL RATE: 72 BPM
EKG P AXIS: 46 DEGREES
EKG P AXIS: 61 DEGREES
EKG P-R INTERVAL: 152 MS
EKG P-R INTERVAL: 152 MS
EKG Q-T INTERVAL: 400 MS
EKG Q-T INTERVAL: 426 MS
EKG QRS DURATION: 80 MS
EKG QRS DURATION: 82 MS
EKG QTC CALCULATION (BAZETT): 438 MS
EKG QTC CALCULATION (BAZETT): 446 MS
EKG R AXIS: -32 DEGREES
EKG R AXIS: -34 DEGREES
EKG T AXIS: -27 DEGREES
EKG T AXIS: 1 DEGREES
EKG VENTRICULAR RATE: 66 BPM
EKG VENTRICULAR RATE: 72 BPM
ERYTHROCYTE [DISTWIDTH] IN BLOOD BY AUTOMATED COUNT: 13.9 % (ref 11.5–14.5)
ERYTHROCYTE [DISTWIDTH] IN BLOOD BY AUTOMATED COUNT: 45.9 FL (ref 35–45)
GLUCOSE BLD-MCNC: 140 MG/DL (ref 70–108)
GLUCOSE BLD-MCNC: 148 MG/DL (ref 70–108)
GLUCOSE BLD-MCNC: 210 MG/DL (ref 70–108)
GLUCOSE BLD-MCNC: 216 MG/DL (ref 70–108)
HBA1C MFR BLD: 7.3 % (ref 4.4–6.4)
HCT VFR BLD CALC: 48.1 % (ref 42–52)
HEMOGLOBIN: 15.8 GM/DL (ref 14–18)
LV EF: 55 %
LVEF MODALITY: NORMAL
MCH RBC QN AUTO: 30 PG (ref 26–33)
MCHC RBC AUTO-ENTMCNC: 32.8 GM/DL (ref 32.2–35.5)
MCV RBC AUTO: 91.3 FL (ref 80–94)
PLATELET # BLD: 221 THOU/MM3 (ref 130–400)
PMV BLD AUTO: 10.6 FL (ref 9.4–12.4)
POTASSIUM SERPL-SCNC: 4.3 MEQ/L (ref 3.5–5.2)
RBC # BLD: 5.27 MILL/MM3 (ref 4.7–6.1)
REASON FOR REJECTION: NORMAL
REJECTED TEST: NORMAL
SODIUM BLD-SCNC: 136 MEQ/L (ref 135–145)
TROPONIN T: 0.01 NG/ML
TROPONIN T: 0.02 NG/ML
WBC # BLD: 9.4 THOU/MM3 (ref 4.8–10.8)

## 2021-09-07 PROCEDURE — 84484 ASSAY OF TROPONIN QUANT: CPT

## 2021-09-07 PROCEDURE — 6360000002 HC RX W HCPCS: Performed by: PHYSICIAN ASSISTANT

## 2021-09-07 PROCEDURE — 6360000002 HC RX W HCPCS

## 2021-09-07 PROCEDURE — 93017 CV STRESS TEST TRACING ONLY: CPT | Performed by: NUCLEAR MEDICINE

## 2021-09-07 PROCEDURE — 93306 TTE W/DOPPLER COMPLETE: CPT

## 2021-09-07 PROCEDURE — G0378 HOSPITAL OBSERVATION PER HR: HCPCS

## 2021-09-07 PROCEDURE — 96375 TX/PRO/DX INJ NEW DRUG ADDON: CPT

## 2021-09-07 PROCEDURE — 78452 HT MUSCLE IMAGE SPECT MULT: CPT

## 2021-09-07 PROCEDURE — A9500 TC99M SESTAMIBI: HCPCS | Performed by: PHYSICIAN ASSISTANT

## 2021-09-07 PROCEDURE — 6370000000 HC RX 637 (ALT 250 FOR IP): Performed by: PHYSICIAN ASSISTANT

## 2021-09-07 PROCEDURE — 93005 ELECTROCARDIOGRAM TRACING: CPT | Performed by: PHYSICIAN ASSISTANT

## 2021-09-07 PROCEDURE — 36415 COLL VENOUS BLD VENIPUNCTURE: CPT

## 2021-09-07 PROCEDURE — 99214 OFFICE O/P EST MOD 30 MIN: CPT | Performed by: NUCLEAR MEDICINE

## 2021-09-07 PROCEDURE — 99226 PR SBSQ OBSERVATION CARE/DAY 35 MINUTES: CPT | Performed by: PHYSICIAN ASSISTANT

## 2021-09-07 PROCEDURE — 3430000000 HC RX DIAGNOSTIC RADIOPHARMACEUTICAL: Performed by: PHYSICIAN ASSISTANT

## 2021-09-07 PROCEDURE — 2580000003 HC RX 258: Performed by: PHYSICIAN ASSISTANT

## 2021-09-07 PROCEDURE — 80048 BASIC METABOLIC PNL TOTAL CA: CPT

## 2021-09-07 PROCEDURE — 96374 THER/PROPH/DIAG INJ IV PUSH: CPT

## 2021-09-07 PROCEDURE — 82948 REAGENT STRIP/BLOOD GLUCOSE: CPT

## 2021-09-07 PROCEDURE — 85027 COMPLETE CBC AUTOMATED: CPT

## 2021-09-07 RX ADMIN — HYDRALAZINE HYDROCHLORIDE 50 MG: 50 TABLET, FILM COATED ORAL at 19:42

## 2021-09-07 RX ADMIN — ACETAMINOPHEN 650 MG: 325 TABLET ORAL at 18:25

## 2021-09-07 RX ADMIN — FUROSEMIDE 40 MG: 40 TABLET ORAL at 09:00

## 2021-09-07 RX ADMIN — FOLIC ACID 1 MG: 1 TABLET ORAL at 08:59

## 2021-09-07 RX ADMIN — ARIPIPRAZOLE 15 MG: 15 TABLET ORAL at 09:00

## 2021-09-07 RX ADMIN — ONDANSETRON 4 MG: 2 INJECTION INTRAMUSCULAR; INTRAVENOUS at 02:32

## 2021-09-07 RX ADMIN — ATORVASTATIN CALCIUM 40 MG: 40 TABLET, FILM COATED ORAL at 19:42

## 2021-09-07 RX ADMIN — FERROUS SULFATE TAB 325 MG (65 MG ELEMENTAL FE) 325 MG: 325 (65 FE) TAB at 08:59

## 2021-09-07 RX ADMIN — POTASSIUM CHLORIDE 20 MEQ: 1500 TABLET, EXTENDED RELEASE ORAL at 09:00

## 2021-09-07 RX ADMIN — OXYCODONE HYDROCHLORIDE AND ACETAMINOPHEN 1000 MG: 500 TABLET ORAL at 08:59

## 2021-09-07 RX ADMIN — INSULIN GLARGINE 10 UNITS: 100 INJECTION, SOLUTION SUBCUTANEOUS at 21:29

## 2021-09-07 RX ADMIN — Medication 8.9 MILLICURIE: at 11:05

## 2021-09-07 RX ADMIN — FERROUS SULFATE TAB 325 MG (65 MG ELEMENTAL FE) 325 MG: 325 (65 FE) TAB at 16:55

## 2021-09-07 RX ADMIN — HYDRALAZINE HYDROCHLORIDE 50 MG: 50 TABLET, FILM COATED ORAL at 09:00

## 2021-09-07 RX ADMIN — ASPIRIN 81 MG: 81 TABLET, CHEWABLE ORAL at 09:00

## 2021-09-07 RX ADMIN — MORPHINE SULFATE 2 MG: 2 INJECTION, SOLUTION INTRAMUSCULAR; INTRAVENOUS at 02:32

## 2021-09-07 RX ADMIN — BUSPIRONE HYDROCHLORIDE 10 MG: 10 TABLET ORAL at 08:59

## 2021-09-07 RX ADMIN — TAMSULOSIN HYDROCHLORIDE 0.4 MG: 0.4 CAPSULE ORAL at 19:42

## 2021-09-07 RX ADMIN — INSULIN LISPRO 1 UNITS: 100 INJECTION, SOLUTION INTRAVENOUS; SUBCUTANEOUS at 21:29

## 2021-09-07 RX ADMIN — LIDOCAINE HYDROCHLORIDE: 20 SOLUTION ORAL; TOPICAL at 09:10

## 2021-09-07 RX ADMIN — PANTOPRAZOLE SODIUM 40 MG: 40 TABLET, DELAYED RELEASE ORAL at 09:00

## 2021-09-07 RX ADMIN — SODIUM CHLORIDE, PRESERVATIVE FREE 10 ML: 5 INJECTION INTRAVENOUS at 09:01

## 2021-09-07 RX ADMIN — ACETAMINOPHEN 650 MG: 325 TABLET ORAL at 06:24

## 2021-09-07 RX ADMIN — ALLOPURINOL 300 MG: 300 TABLET ORAL at 08:59

## 2021-09-07 RX ADMIN — SODIUM CHLORIDE, PRESERVATIVE FREE 10 ML: 5 INJECTION INTRAVENOUS at 19:42

## 2021-09-07 RX ADMIN — Medication 29.7 MILLICURIE: at 12:10

## 2021-09-07 RX ADMIN — CITALOPRAM 30 MG: 20 TABLET, FILM COATED ORAL at 09:00

## 2021-09-07 RX ADMIN — BUSPIRONE HYDROCHLORIDE 10 MG: 10 TABLET ORAL at 19:42

## 2021-09-07 RX ADMIN — Medication 1 TABLET: at 09:00

## 2021-09-07 ASSESSMENT — PAIN DESCRIPTION - PROGRESSION
CLINICAL_PROGRESSION: GRADUALLY IMPROVING
CLINICAL_PROGRESSION: GRADUALLY IMPROVING

## 2021-09-07 ASSESSMENT — PAIN SCALES - GENERAL
PAINLEVEL_OUTOF10: 6
PAINLEVEL_OUTOF10: 0
PAINLEVEL_OUTOF10: 6
PAINLEVEL_OUTOF10: 0
PAINLEVEL_OUTOF10: 0
PAINLEVEL_OUTOF10: 6
PAINLEVEL_OUTOF10: 7
PAINLEVEL_OUTOF10: 0
PAINLEVEL_OUTOF10: 0

## 2021-09-07 ASSESSMENT — PAIN DESCRIPTION - FREQUENCY
FREQUENCY: INTERMITTENT
FREQUENCY: INTERMITTENT

## 2021-09-07 ASSESSMENT — PAIN DESCRIPTION - LOCATION
LOCATION: ABDOMEN
LOCATION: ABDOMEN

## 2021-09-07 ASSESSMENT — PAIN DESCRIPTION - DESCRIPTORS
DESCRIPTORS: ACHING
DESCRIPTORS: ACHING

## 2021-09-07 ASSESSMENT — PAIN SCALES - WONG BAKER: WONGBAKER_NUMERICALRESPONSE: 0

## 2021-09-07 ASSESSMENT — PAIN DESCRIPTION - PAIN TYPE
TYPE: ACUTE PAIN
TYPE: ACUTE PAIN

## 2021-09-07 ASSESSMENT — PAIN DESCRIPTION - ORIENTATION
ORIENTATION: MID
ORIENTATION: MID

## 2021-09-07 ASSESSMENT — PAIN DESCRIPTION - ONSET
ONSET: ON-GOING
ONSET: GRADUAL

## 2021-09-07 NOTE — CARE COORDINATION
9/7/21, 11:26 AM EDT  DISCHARGE PLANNING EVALUATION:    James Velazquez       Admitted: 9/6/2021/ [place] day: 0   Location: 6K-12/012-A Reason for admit: Chest pain [R07.9]  Elevated troponin [R77.8]  Abdominal pain, unspecified abdominal location [R10.9]  Chest pain, unspecified type [R07.9]   PMH:  has a past medical history of Aortic stenosis, mild to moderate, BPH (benign prostatic hyperplasia), Carpal tunnel syndrome on right, Chronic gout, DM2 (diabetes mellitus, type 2) ([address] Dyslipidemia, GERD (gastroesophageal reflux disease), Heart failure with preserved ejection fraction ([address] History of alcohol abuse, History of atrial fibrillation, History of osteomyelitis, Hypertension, essential, Hypogonadism, male, Major depression, Mood disorder ([address] Morbid obesity ([address] DURAN (nonalcoholic steatohepatitis), KIARA treated with BiPAP, and Vitamin D deficiency. Procedure: none  Barriers to Discharge:  Tushar elev, [place] 1133. HTN, last 160/84. Cardiology consulted, echo and stress test ordered. PCP: SANDRA Ellis - CNP   %    Patient Goals/Plan/Treatment Preferences: Corrie Patiño is off the unit, met with his wife in room. She states they will return home together at discharge. Corrie Patiño uses a 4-prong cane to ambulate. His [facility] She does request a script for Depends, as their insurance will cover the cost, [facility] RN notified. Transportation/Food Security/Housekeeping Addressed:  No issues identified.

## 2021-09-07 NOTE — FLOWSHEET NOTE
09/07/21 0032   Provider Notification   Reason for Communication Evaluate   Provider Name Hilda Rooney   Provider Notification Advance Practice Clinician (CNS/NP/CNM/CRNA/PA)   Method of Communication Secure Message   Response See orders   Notification Time 6903   see new orders

## 2021-09-07 NOTE — PROGRESS NOTES
Hospitalist Progress Note      Patient:  Ela Feliciano    Unit/Bed:6K-12/012-A  YOB: 1970  MRN: [id number]   Acct: [de-identified]   PCP: SANDRA Scott CNP  Date of Admission: 9/6/2021    Assessment/Plan:    1. Atypical chest pain: centrally located chest heaviness, relief with nitro per ED. EKG showed NSR, initial trop 0.011 trend up to 0.015, 0.017. Tele monitor. Continue with ASA / statin. Cardiology consulted. Echocardiogram ordered. Patient taken for stress test.   2. Abd pain, unclear etiology: Patient complaining of epigastric abdominal pain that radiates bilaterally in upper quadrants. CT A/P unremarkable. Patient had episode of associated nausea and emesis after eating last night. Improved with GI cocktail. Patient on Protonix daily. If continues, will consult GI.  3. Undifferentiated dyspnea: without hypoxia. Of note, pt chronically SOB per chart review. CXR showed mild patchy airspace disease at medial R lung base which could represent atelectasis or PNA. Afebrile, no leukocytosis. Low suspicion for PNA. BNP wnl, no rales on exam. Encourage incentive spirometer. 4. LLE edema: chronic, +2 pitting edema. Pt notes this is near baseline. BNP wnl. Pt on Lasix at home, continue and monitor. 5. Chronic HFpEF: no overt decompensation noted. ECHO with EF 60% from 09/2020. Strict I/Os, daily weights. On diuretic at home. Low Na diet. 6. Chronic low back pain: hx of undifferentiated axial spondyloarthritis. Pepito Ahumada Hx of dermatomyositis and inflammatory arthritis. Follows with rheumatology who per most recent visit dated 9/2/2021 planned to start anti-TNF vs IL17 pending TB gold. 7. IDDM II, uncontrolled: resume home Lantus, SSI. Hgb A1c noted at 9.9. ACCU. Carb control. Hypoglycemia protocol in place. 8. Essential HTN: BP elevated on admission, continue home meds and monitor to adjust as needed   9.  Hx of DVT: in LLE while pt was admitted with Kang in 9/2020. Continue with Eliquis. No erythema, TTP. 10. Chronic L foot drop: follow up planned with Dr. Argelia Barnes in Neuro as outpatient. PT/OT  11. KIARA not on CPAP: pt states he has f/u at the end of this month to obtain a machine  12. Hx of gout: Allopurinol   13. CKD stage I: Cr stable, avoid nephrotoxic agents. 14. Severe obesity: BMI 44.64       Chief Complaint: chest pain / SOB    Initial H and P:-    \"Pt is a 45 yo male with PMH of AS, BPH, DM II, GERD, CHF, CKD, essential HTN, KIARA on BiPAP, Afib, HLD, and severe obesity presents to [address] [place] [address] ED with chief complaint of chest pain and SOB that began this am around 0930. Hx obtained via pt and chart review.      Pt states that upon waking this am he began to experience a \"heaviness\" centrally located in his chest. Pt reports that after a couple of minutes this feeling radiated down into his epigastric area and bilaterally across his upper quadrants where it felt that he was \"being kicked in the stomach\". Pt rated this pain at 6/10 in severity. Pt reports associated SOB, denies any nausea, vomiting, or diarrhea. Pt states that this lasted for \"a while\" but that it resolved. Pt states he did not try any medication or intervention. Pt notes that this pain in his stomach is intermittent and is worse upon taking a deep breath. Pt also reports swelling in his LLE that has been present since he was diagnosed with a blood clot in that leg and treated with [address] [place] [address] which pt does not know if he is still taking. This swelling is unchanged and pt also notes that he has chronic weakness in his LLE since then and has to utilize a cane to walk. Pt is to f/u with Neurology (Dr. Argelia Barnes) for a some test that pt does not know the name of. Pt denies current CP at this time.      In ED, pt was given ASA and a dose of nitro which improved pts symptoms. Pt admitted to [place] [address] for observation.  \"    Subjective (past 24 hours):   9/7-> patient doing okay, sitting up in chair having just returned from stress test.  Patient notes that last night he ate dinner and proceeded to vomit. Patient denies fever/chills. Reports that his chest heaviness has resolved, however admits to continued sharp intermittent stabbing pains in his epigastric region. Past medical history, family history, social history and allergies reviewed again and is unchanged since admission. ROS (All review of systems completed. Pertinent positives noted.  Otherwise All other systems reviewed and negative.)     Medications:  Reviewed    Infusion Medications    sodium chloride      dextrose       Scheduled Medications    sodium chloride flush  5-40 mL IntraVENous 2 times per day    aspirin  81 mg Oral Daily    allopurinol  300 mg Oral Daily    atorvastatin  40 mg Oral Nightly    ARIPiprazole  15 mg Oral Daily    folic acid  1 mg Oral Daily    ferrous sulfate  325 mg Oral BID WC    furosemide  40 mg Oral Daily    busPIRone  10 mg Oral BID    citalopram  30 mg Oral Daily    insulin glargine  10 Units SubCUTAneous Nightly    hydrALAZINE  50 mg Oral BID    tiotropium-olodaterol  2 puff Inhalation Daily    pantoprazole  40 mg Oral Daily    potassium chloride  20 mEq Oral Daily    vitamin C  1,000 mg Oral Daily    tamsulosin  0.4 mg Oral Nightly    therapeutic multivitamin-minerals  1 tablet Oral Daily    insulin lispro  0-12 Units SubCUTAneous TID WC    insulin lispro  0-6 Units SubCUTAneous Nightly     PRN Meds: nitroGLYCERIN, sodium chloride flush, sodium chloride, ondansetron **OR** ondansetron, acetaminophen **OR** acetaminophen, polyethylene glycol, magnesium sulfate, potassium chloride **OR** potassium alternative oral replacement **OR** potassium chloride, albuterol sulfate HFA, glucose, dextrose, glucagon (rDNA), dextrose, morphine      Intake/Output Summary (Last 24 hours) at 9/7/2021 1312  Last data filed at 9/7/2021 0318  Gross per 24 hour   Intake 100 ml   Output 0 ml   Net negative) 10/22/2020         Lab Results   Component Value Date    LABGRAM  10/12/2020     Moderate segmented neutrophils observed. Rare epithelial cells observed. Rare gram positive cocci occurring singly and in pairs. MRSA culture only:No results found for: [facility]    Urine culture:   Lab Results   Component Value Date    Cheryl Labrum  10/06/2020     At least one of drugs in current antibiotic therapy is ineffective in vitro for isolate. LABURIN [place] count: >100,000 CFU/mL   10/06/2020       Respiratory culture: No results found for: CULTRESP    Aerobic and Anaerobic :  No results found for: LABAERO  No results found for: LABANAE    Urinalysis:      Lab Results   Component Value Date    NITRU NEGATIVE 11/03/2020    WBCUA 25-50 11/03/2020    BACTERIA FEW 11/03/2020    RBCUA 3-5 11/03/2020    BLOODU NEGATIVE 11/03/2020    SPECGRAV >=1.030 10/06/2020    GLUCOSEU NEGATIVE 11/03/2020       Radiology:  CT ABDOMEN PELVIS WO CONTRAST Additional Contrast? Radiologist Recommendation   Final Result   1. There are diffuse soft tissue calcifications involving the fascial planes of the visualized lower chest, abdomen and pelvis with more focal areas of larger coarse calcification demonstrated along the medial aspect of the right gluteus region as well    as along the anterolateral left upper quadrant. These calcifications are nonspecific but may be related to dermatomyositis. Recommend clinical correlation. 2. No other acute intra-abdominal or pelvic findings are otherwise seen. **This report has been created using voice recognition software. It may contain minor errors which are inherent in voice recognition technology. **      Final report electronically signed by Dr. Nicho Green on 9/6/2021 4:05 PM      XR CHEST PORTABLE   Final Result   1. Mild patchy airspace disease at the medial right lung base may represent atelectasis or pneumonia.             **This report has been created using voice recognition software. It may contain minor errors which are inherent in voice recognition technology. **      Final report electronically signed by Dr. Maude Pate on 9/6/2021 2:11 PM        CT ABDOMEN PELVIS WO CONTRAST Additional Contrast? Radiologist Recommendation    Result Date: 9/6/2021  PROCEDURE: CT ABDOMEN PELVIS WO CONTRAST CLINICAL INFORMATION: Chest and abdominal pain starting this morning, intra-abdominal cause COMPARISON: 11/7/2020 TECHNIQUE:  Axial CT images were obtained through the abdomen and pelvis without contrast Coronal and sagittal reformatted images were rendered. All CT scans at this facility use dose modulation, iterative reconstruction, and/or weight-based dosing when appropriate to reduce radiation dose to as low as reasonably achievable. FINDINGS: Limited evaluation of the lung bases demonstrates mild dependent bibasilar atelectasis. The liver, gallbladder, spleen, pancreas and adrenal glands appear normal. The kidneys demonstrate no evidence of hydronephrosis or hydroureter. No nephrolithiasis or ureterolithiasis is seen. The urinary bladder appears normal. There is no evidence of bowel obstruction. There is no free air or free fluid. There are diffuse soft tissue calcifications involving the fascial planes of the visualized lower chest, abdomen and pelvis with more focal areas of larger coarse calcification demonstrated along the medial aspect of the right gluteus region as well as along the anterolateral left upper quadrant. These calcifications are nonspecific but may be related to dermatomyositis. Recommend clinical correlation. Marked degenerative changes are demonstrated involving the bilateral right greater than left hip joints. There is also bilateral SI joint arthrosis and marked lower lumbar facet arthrosis.      1. There are diffuse soft tissue calcifications involving the fascial planes of the visualized lower chest, abdomen and pelvis with more focal areas of larger coarse calcification demonstrated along the medial aspect of the right gluteus region as well as along the anterolateral left upper quadrant. These calcifications are nonspecific but may be related to dermatomyositis. Recommend clinical correlation. 2. No other acute intra-abdominal or pelvic findings are otherwise seen. **This report has been created using voice recognition software. It may contain minor errors which are inherent in voice recognition technology. ** Final report electronically signed by Dr. Juanita Diehl on 9/6/2021 4:05 PM    XR CHEST PORTABLE    Result Date: 9/6/2021  PROCEDURE: XR CHEST PORTABLE CLINICAL INFORMATION: Chest Pain COMPARISON: 11/13/2020 TECHNIQUE:  AP mobile chest  FINDINGS: The heart size is within normal limits. Patchy airspace disease at the medial right lung base is demonstrated which may represent atelectasis or pneumonia. No pleural effusion or pneumothorax is seen. No acute osseous findings are seen. 1. Mild patchy airspace disease at the medial right lung base may represent atelectasis or pneumonia. **This report has been created using voice recognition software. It may contain minor errors which are inherent in voice recognition technology. ** Final report electronically signed by Dr. Juanita Diehl on 9/6/2021 2:11 PM      Electronically signed by Jesica Augustin PA-C on 9/7/2021 at 1:12 PM

## 2021-09-07 NOTE — CONSULTS
[address] [place] [address]                                  CONSULTATION    PATIENT NAME: Devin Draper                :        1970  MED REC NO:   [id number]                           ROOM:       0012  ACCOUNT NO:   [de-identified]                           ADMIT DATE: 2021  PROVIDER:     JASMYN Levin DATE:  2021    CARDIOLOGY CONSULTATION    REASON FOR CONSULTATION:  Chest discomfort, shortness of breath. REQUESTING PROVIDER:  Hospitalist Service. HISTORY OF PRESENT ILLNESS:  A pleasant 13-year-old gentleman with  history of morbid obesity and sleep apnea, history of multiple risk  factors for coronary artery disease including diabetes, hypertension,  hyperlipidemia, who has some degree of valvular stenosis with echo last  year showing mild degree of stenosis, and he usually follows with Dr. Mohinder Carranza; comes in with worsening shortness of breath and chest discomfort  type symptoms, has been going on for several days. Heavy pressure  sensation. No specific triggers. No specific radiation. No specific  associated symptoms. So far workup has been pretty much unremarkable  except for borderline cardiac enzymes. We are consulted to assist in  the management of the patient. REVIEW OF SYSTEMS:  No obvious fever or chills. No hematuria or  dysuria. No abdominal pain, nausea, vomiting, or diarrhea. Chest pain  and shortness breath as above. No obvious active psych problems or  suicidal ideation. No skin rashes. No obvious dizziness,  lightheadedness or loss of consciousness. No recent trauma. No  bleeding problem. No HEENT-related problems. PAST MEDICAL HISTORY:  1. Hypertension. 2.  Hyperlipidemia. 3.  Obesity. 4.  Sleep apnea. 5.  Diabetes. 6.  Arthritis. ALLERGIES:  PENICILLIN.     CURRENT MEDICATIONS:  Lipitor 40 a day, aspirin 81 a day, Celexa 30 a  day, BuSpar 10 a day, Lantus, Humalog, Flomax 0.4 a day. SOCIAL HISTORY:  No tobacco, no drugs, no alcohol. FAMILY HISTORY:  Significant for coronary artery disease. PHYSICAL EXAMINATION:  VITAL SIGNS:  Showed a blood pressure of 130/80, heart rate of 70. GENERAL APPEARANCE:  Pleasant gentleman, in no obvious acute distress. EYES AND EARS:  No discharge. NECK:  No JVD. No bruits. No masses. LUNGS:  Decreased air entry. No crackles. No wheezes. HEART:  Normal S1, S2. Systolic murmur grade 2/6. ABDOMEN:  Soft, nontender. Positive bowel sounds. No organomegaly. EXTREMITIES:  Moderate edema. NEUROLOGIC:  Grossly intact. Awake, alert. No focal deficits. PSYCH:  No evidence of active psychosis. SKIN:  No rashes. LABORATORY DATA:  Showed sodium 140, potassium 4.9, BUN 17, creatinine  1.1. White count 9.4, hemoglobin 15.8, hematocrit 48.1, platelets 484. EKG showed sinus rhythm, nonspecific ST-T changes. IMPRESSION:  This is a gentleman who comes in with the above  presentation. The patient's cardiac enzymes seemed to be borderline and  relatively nonspecific. The patient has had a cardiac catheterization  before with no significant coronary artery disease. He does have  multiple risk factors for coronary artery disease. MY RECOMMENDATIONS:  1. Proceed with noninvasive cardiac testing to assess for ischemia. 2.  If the patient shows evidence of ischemia, he will be considered for  redo cardiac catheterization. 3.  Further management will depend on the above tests. Thank you for allowing me to participate in this patient's care.         Cheyenne Vela M.D.    D: 09/07/2021 14:16:09       T: 09/07/2021 14:18:58     [email or web address]  Job#: [id number]     Doc#: [id number]    CC:

## 2021-09-08 LAB
GLUCOSE BLD-MCNC: 149 MG/DL (ref 70–108)
GLUCOSE BLD-MCNC: 169 MG/DL (ref 70–108)
GLUCOSE BLD-MCNC: 172 MG/DL (ref 70–108)
GLUCOSE BLD-MCNC: 194 MG/DL (ref 70–108)

## 2021-09-08 PROCEDURE — 96376 TX/PRO/DX INJ SAME DRUG ADON: CPT

## 2021-09-08 PROCEDURE — 99225 PR SBSQ OBSERVATION CARE/DAY 25 MINUTES: CPT | Performed by: FAMILY MEDICINE

## 2021-09-08 PROCEDURE — 6360000002 HC RX W HCPCS: Performed by: PHYSICIAN ASSISTANT

## 2021-09-08 PROCEDURE — 2580000003 HC RX 258: Performed by: PHYSICIAN ASSISTANT

## 2021-09-08 PROCEDURE — 82948 REAGENT STRIP/BLOOD GLUCOSE: CPT

## 2021-09-08 PROCEDURE — 99214 OFFICE O/P EST MOD 30 MIN: CPT | Performed by: NURSE PRACTITIONER

## 2021-09-08 PROCEDURE — 6370000000 HC RX 637 (ALT 250 FOR IP): Performed by: FAMILY MEDICINE

## 2021-09-08 PROCEDURE — G0378 HOSPITAL OBSERVATION PER HR: HCPCS

## 2021-09-08 PROCEDURE — 6370000000 HC RX 637 (ALT 250 FOR IP): Performed by: PHYSICIAN ASSISTANT

## 2021-09-08 RX ORDER — CLOTRIMAZOLE AND BETAMETHASONE DIPROPIONATE 10; .64 MG/G; MG/G
CREAM TOPICAL 2 TIMES DAILY
Status: DISCONTINUED | OUTPATIENT
Start: 2021-09-08 | End: 2021-09-09 | Stop reason: HOSPADM

## 2021-09-08 RX ORDER — DOCUSATE SODIUM 100 MG/1
100 CAPSULE, LIQUID FILLED ORAL 2 TIMES DAILY
Status: DISCONTINUED | OUTPATIENT
Start: 2021-09-08 | End: 2021-09-09 | Stop reason: HOSPADM

## 2021-09-08 RX ORDER — BISACODYL 10 MG
10 SUPPOSITORY, RECTAL RECTAL ONCE
Status: COMPLETED | OUTPATIENT
Start: 2021-09-08 | End: 2021-09-08

## 2021-09-08 RX ADMIN — HYDRALAZINE HYDROCHLORIDE 50 MG: 50 TABLET, FILM COATED ORAL at 20:59

## 2021-09-08 RX ADMIN — FOLIC ACID 1 MG: 1 TABLET ORAL at 08:24

## 2021-09-08 RX ADMIN — ASPIRIN 81 MG: 81 TABLET, CHEWABLE ORAL at 08:25

## 2021-09-08 RX ADMIN — BUSPIRONE HYDROCHLORIDE 10 MG: 10 TABLET ORAL at 08:24

## 2021-09-08 RX ADMIN — DOCUSATE SODIUM 100 MG: 100 CAPSULE ORAL at 11:49

## 2021-09-08 RX ADMIN — CLOTRIMAZOLE AND BETAMETHASONE DIPROPIONATE: 10; .5 CREAM TOPICAL at 11:49

## 2021-09-08 RX ADMIN — SODIUM CHLORIDE, PRESERVATIVE FREE 10 ML: 5 INJECTION INTRAVENOUS at 08:25

## 2021-09-08 RX ADMIN — FERROUS SULFATE TAB 325 MG (65 MG ELEMENTAL FE) 325 MG: 325 (65 FE) TAB at 18:48

## 2021-09-08 RX ADMIN — INSULIN LISPRO 1 UNITS: 100 INJECTION, SOLUTION INTRAVENOUS; SUBCUTANEOUS at 21:58

## 2021-09-08 RX ADMIN — ALLOPURINOL 300 MG: 300 TABLET ORAL at 08:24

## 2021-09-08 RX ADMIN — POTASSIUM CHLORIDE 20 MEQ: 1500 TABLET, EXTENDED RELEASE ORAL at 08:25

## 2021-09-08 RX ADMIN — Medication 1 TABLET: at 08:24

## 2021-09-08 RX ADMIN — ACETAMINOPHEN 650 MG: 325 TABLET ORAL at 15:06

## 2021-09-08 RX ADMIN — HYDRALAZINE HYDROCHLORIDE 50 MG: 50 TABLET, FILM COATED ORAL at 08:24

## 2021-09-08 RX ADMIN — FERROUS SULFATE TAB 325 MG (65 MG ELEMENTAL FE) 325 MG: 325 (65 FE) TAB at 08:24

## 2021-09-08 RX ADMIN — ATORVASTATIN CALCIUM 40 MG: 40 TABLET, FILM COATED ORAL at 20:59

## 2021-09-08 RX ADMIN — INSULIN GLARGINE 10 UNITS: 100 INJECTION, SOLUTION SUBCUTANEOUS at 21:58

## 2021-09-08 RX ADMIN — OXYCODONE HYDROCHLORIDE AND ACETAMINOPHEN 1000 MG: 500 TABLET ORAL at 08:25

## 2021-09-08 RX ADMIN — CLOTRIMAZOLE AND BETAMETHASONE DIPROPIONATE: 10; .5 CREAM TOPICAL at 21:00

## 2021-09-08 RX ADMIN — POLYETHYLENE GLYCOL 3350 17 G: 17 POWDER, FOR SOLUTION ORAL at 08:50

## 2021-09-08 RX ADMIN — BUSPIRONE HYDROCHLORIDE 10 MG: 10 TABLET ORAL at 20:59

## 2021-09-08 RX ADMIN — CITALOPRAM 30 MG: 20 TABLET, FILM COATED ORAL at 08:25

## 2021-09-08 RX ADMIN — FUROSEMIDE 40 MG: 40 TABLET ORAL at 08:25

## 2021-09-08 RX ADMIN — TAMSULOSIN HYDROCHLORIDE 0.4 MG: 0.4 CAPSULE ORAL at 21:03

## 2021-09-08 RX ADMIN — ARIPIPRAZOLE 15 MG: 15 TABLET ORAL at 08:24

## 2021-09-08 RX ADMIN — BISACODYL 10 MG: 10 SUPPOSITORY RECTAL at 11:49

## 2021-09-08 RX ADMIN — ONDANSETRON 4 MG: 2 INJECTION INTRAMUSCULAR; INTRAVENOUS at 23:57

## 2021-09-08 RX ADMIN — ACETAMINOPHEN 650 MG: 325 TABLET ORAL at 20:59

## 2021-09-08 RX ADMIN — DOCUSATE SODIUM 100 MG: 100 CAPSULE ORAL at 21:00

## 2021-09-08 RX ADMIN — PANTOPRAZOLE SODIUM 40 MG: 40 TABLET, DELAYED RELEASE ORAL at 08:25

## 2021-09-08 RX ADMIN — SODIUM CHLORIDE, PRESERVATIVE FREE 10 ML: 5 INJECTION INTRAVENOUS at 21:00

## 2021-09-08 ASSESSMENT — PAIN DESCRIPTION - LOCATION
LOCATION: ABDOMEN
LOCATION: HEAD

## 2021-09-08 ASSESSMENT — PAIN DESCRIPTION - PAIN TYPE
TYPE: ACUTE PAIN
TYPE: ACUTE PAIN

## 2021-09-08 ASSESSMENT — PAIN SCALES - GENERAL
PAINLEVEL_OUTOF10: 3
PAINLEVEL_OUTOF10: 6
PAINLEVEL_OUTOF10: 0
PAINLEVEL_OUTOF10: 6
PAINLEVEL_OUTOF10: 6

## 2021-09-08 ASSESSMENT — PAIN DESCRIPTION - ORIENTATION: ORIENTATION: MID

## 2021-09-08 ASSESSMENT — PAIN SCALES - WONG BAKER: WONGBAKER_NUMERICALRESPONSE: 0

## 2021-09-08 ASSESSMENT — PAIN DESCRIPTION - DESCRIPTORS: DESCRIPTORS: HEADACHE

## 2021-09-08 NOTE — PROGRESS NOTES
Hospitalist Progress Note      Patient:  Ela Feliciano    Unit/Bed:6K-12/012-A  YOB: 1970  MRN: [id number]   Acct: [de-identified]   PCP: SANDRA Scott CNP  Date of Admission: 9/6/2021    Assessment and Plan (New + last one as below consecutively):          9/8/2021: Has eczema bilateral forearms in addition to possible fungal infection which is chronic, instructed to moisture the skin with Vaseline in addition to that I will prescribe Lotrisone cream to use twice daily, has constipation and feeling nauseous slightly, give Dulcolax suppository in addition to Colace twice daily, then use as needed when improved, does not have any chest pain currently he mentioned it is improved, cardiology on the case. The patient does not feel comfortable to go home today, we will keep until possibly tomorrow discharge. Atypical chest pain: centrally located chest heaviness, relief with nitro per ED. EKG showed NSR, initial trop 0.011 trend up to 0.015, 0.017. Tele monitor. Continue with ASA / statin. Cardiology consulted. Echocardiogram ordered. Patient taken for stress test.   1. Abd pain, unclear etiology: Patient complaining of epigastric abdominal pain that radiates bilaterally in upper quadrants. CT A/P unremarkable. Patient had episode of associated nausea and emesis after eating last night. Improved with GI cocktail. Patient on Protonix daily. If continues, will consult GI.  2. Undifferentiated dyspnea: without hypoxia. Of note, pt chronically SOB per chart review. CXR showed mild patchy airspace disease at medial R lung base which could represent atelectasis or PNA. Afebrile, no leukocytosis. Low suspicion for PNA. BNP wnl, no rales on exam. Encourage incentive spirometer. 3. LLE edema: chronic, +2 pitting edema. Pt notes this is near baseline. BNP wnl. Pt on Lasix at home, continue and monitor. 4. Chronic HFpEF: no overt decompensation noted. ECHO with EF 60% from 09/2020. Strict I/Os, daily weights. On diuretic at home. Low Na diet.   5. Chronic low back pain: hx of undifferentiated axial spondyloarthritis. Bri Tolentino Hx of dermatomyositis and inflammatory arthritis. Follows with rheumatology who per most recent visit dated 9/2/2021 planned to start anti-TNF vs IL17 pending TB gold. 6. IDDM II, uncontrolled: resume home Lantus, SSI. Hgb A1c noted at 9.9. ACCU. Carb control. Hypoglycemia protocol in place. 7. Essential HTN: BP elevated on admission, continue home meds and monitor to adjust as needed   8. Hx of DVT: in LLE while pt was admitted with COVID in 9/2020. Continue with Eliquis. No erythema, TTP.   9. Chronic L foot drop: follow up planned with Dr. Mami Villalobos in Neuro as outpatient. PT/OT  10. KIARA not on CPAP: pt states he has f/u at the end of this month to obtain a machine  11. Hx of gout: Allopurinol   12. CKD stage I: Cr stable, avoid nephrotoxic agents. 13. Severe obesity: BMI 44.64       PMH, PSH, SH, FHX reviewed in chart review snap shot in EPIC    CC: Eczema bilateral upper extremity, dryness of the skin, atypical chest pain, constipation    HPI: Initial admission HPI by admitting physician reviewed as below:  Pt is a 45 yo male with PMH of AS, BPH, DM II, GERD, CHF, CKD, essential HTN, KIARA on BiPAP, Afib, HLD, and severe obesity presents to [facility] ED with chief complaint of chest pain and SOB that began this am around 0930. Hx obtained via pt and chart review.      Pt states that upon waking this am he began to experience a \"heaviness\" centrally located in his chest. Pt reports that after a couple of minutes this feeling radiated down into his epigastric area and bilaterally across his upper quadrants where it felt that he was \"being kicked in the stomach\". Pt rated this pain at 6/10 in severity. Pt reports associated SOB, denies any nausea, vomiting, or diarrhea. Pt states that this lasted for \"a while\" but that it resolved.  Pt states he did not try any medication or intervention. Pt notes that this pain in his stomach is intermittent and is worse upon taking a deep breath. Pt also reports swelling in his LLE that has been present since he was diagnosed with a blood clot in that leg and treated with [address] which pt does not know if he is still taking. This swelling is unchanged and pt also notes that he has chronic weakness in his LLE since then and has to utilize a cane to walk. Pt is to f/u with Neurology (Dr. Mary Anne Wadsworth) for a some test that pt does not know the name of. Pt denies current CP at this time.      In ED, pt was given ASA and a dose of nitro which improved pts symptoms. Pt admitted to [place] [address] for observation. For further details and updates please refer to assessment and plan at the beginning of the note. ROS (10 point review of systems completed. Pertinent positives noted.  Otherwise ROS is negative) : Nausea, constipation  PMH:  Per HPI and reviewed in the chart  SHX: Reviewed in the chart, also the patient  FHX: Reviewed in the chart, also with the patient  Allergies: Reviewed in the chart  Medications:     sodium chloride      dextrose        docusate sodium  100 mg Oral BID    clotrimazole-betamethasone   Topical BID    sodium chloride flush  5-40 mL IntraVENous 2 times per day    aspirin  81 mg Oral Daily    allopurinol  300 mg Oral Daily    atorvastatin  40 mg Oral Nightly    ARIPiprazole  15 mg Oral Daily    folic acid  1 mg Oral Daily    ferrous sulfate  325 mg Oral BID WC    furosemide  40 mg Oral Daily    busPIRone  10 mg Oral BID    citalopram  30 mg Oral Daily    insulin glargine  10 Units SubCUTAneous Nightly    hydrALAZINE  50 mg Oral BID    tiotropium-olodaterol  2 puff Inhalation Daily    pantoprazole  40 mg Oral Daily    potassium chloride  20 mEq Oral Daily    vitamin C  1,000 mg Oral Daily    tamsulosin  0.4 mg Oral Nightly    therapeutic multivitamin-minerals  1 tablet Oral Daily    insulin lispro  0-12 Units SubCUTAneous TID WC    insulin lispro  0-6 Units SubCUTAneous Nightly       Subjective 9/8/2021: Feels nauseous and constipation, last bowel movement few days ago, give Dulcolax once in addition to Colace start twice daily, holding parameters placed, MiraLAX as needed. Give Lotrisone for the skin because of eczema and possible yeast infection, monitor closely, consider discharge tomorrow when improved. For further information please refer to my assessment and plan at the beginning of the note. Nursing notes, labs, imaging, and vitals reviewed. Vital Signs:   Vitals reviewed and compared with the previous ones  BP (!) 139/95   Pulse 70   Temp 97.9 °F (36.6 °C) (Oral)   Resp 18   Ht 5' 8\" (1.727 m)   Wt 284 lb 4.8 oz (129 kg)   SpO2 97%   BMI 43.23 kg/m²      Intake/Output Summary (Last 24 hours) at 9/8/2021 1353  Last data filed at 9/8/2021 1102  Gross per 24 hour   Intake 540 ml   Output 0 ml   Net 540 ml        General:   Age-appropriate, in no acute distress, morbid obesity  HEENT:  normocephalic and atraumatic. No scleral icterus. PERRLA. Neck: supple. No thyromegaly. Lungs: clear to auscultation. No abnormal breathing sounds appreciated. Cardiac: S1, S2, RRR without murmur. No JVD. Abdomen: soft. Distended, nontender, no guarding or rebound tenderness. Bowel sounds positive. Extremities:  No clubbing, cyanosis, or edema in all extremities. Vasculature: capillary refill < 3 seconds. Pedal pulses intact bilaterally. Skin:  warm and dry. Not clammy. Psych:  Alert to time, person and place. Communicable. Affect appropriate  Lymph:  No supraclavicular ,and no anterior cervical lymphadenopathy. Neurologic:  No focal deficit. CN II-XII grossly intact. Motor and Sensory capacity intact in all extremities.     Labs:   Recent Labs     09/06/21  1327 09/06/21  1613 09/07/21  0542   WBC 8.1 10.7 9.4   HGB 16.6 16.1 15.8   HCT 49.9 48.6 48.1   PLT see below 226 221     Recent Labs calcifications are nonspecific but may be related to dermatomyositis. Recommend clinical correlation. 2. No other acute intra-abdominal or pelvic findings are otherwise seen. **This report has been created using voice recognition software. It may contain minor errors which are inherent in voice recognition technology. **      Final report electronically signed by Dr. Ho Da Silva on 9/6/2021 4:05 PM      XR CHEST PORTABLE   Final Result   1. Mild patchy airspace disease at the medial right lung base may represent atelectasis or pneumonia. **This report has been created using voice recognition software. It may contain minor errors which are inherent in voice recognition technology. **      Final report electronically signed by Dr. Ho Da Silva on 9/6/2021 2:11 PM        Echo Complete    Result Date: 9/7/2021  Transthoracic Echocardiography Report (TTE)  Demographics   Patient Name   Carmela Leal Gender               Male                 D   MR #           [id number]         Race                 Black                                    Ethnicity   Account #      [de-identified]         Room Number          0012   Accession      [id number]        Date of Study        09/07/2021  Number   Date of Birth  1970        Referring Physician  Trudy aMrtinez MD   Age            44 year(s)        Radha Munoz RDCS                                    Interpreting         Trudy Martinez MD                                   Physician  Procedure Type of Study   TTE procedure:ECHOCARDIOGRAM COMPLETE 2D W DOPPLER W COLOR. Procedure Date Date: 09/07/2021 Start: 02:00 PM Study Location: Bedside Technical Quality: Limited visualization due to body habitus. Indications: Aortic stenosis. Additional Medical History:A fib, Diabetes, CHF, ETOH abuse, Morbid obesity, KIARA, Anemia, COVID, GERD Patient Status: Routine Height: 68.11 inches Weight: 288. 81 pounds BSA: 2.39 m^2 BMI: 43.77 kg/m^2 BP: 160/84 mmHg  Conclusions   Summary  Ejection fraction is visually estimated at 55%. Overall left ventricular function is normal.   Signature   ----------------------------------------------------------------  Electronically signed by Aldo Villanueva MD (Interpreting  physician) on 09/07/2021 at 03:43 PM  ----------------------------------------------------------------   Findings   Mitral Valve  The mitral valve structure was normal with normal leaflet separation. DOPPLER: The transmitral velocity was within the normal range with no  evidence for mitral stenosis. There was no evidence of mitral  regurgitation. Aortic Valve  The aortic valve was trileaflet with normal thickness and cuspal  separation. DOPPLER: Transaortic velocity was within the normal range with  no evidence of aortic stenosis. There was no evidence of aortic  regurgitation. Tricuspid Valve  The tricuspid valve structure was normal with normal leaflet separation. DOPPLER: There was no evidence of tricuspid stenosis. There was no  evidence of tricuspid regurgitation. Pulmonic Valve  The pulmonic valve was not well visualized . Trivial pulmonic regurgitation visualized. Left Atrium  Left atrial size was normal.   Left Ventricle  Ejection fraction is visually estimated at 55%. Overall left ventricular function is normal.   Right Atrium  Right atrial size was normal.   Right Ventricle  The right ventricular size was normal with normal systolic function and  wall thickness. Pericardial Effusion  The pericardium was normal in appearance with no evidence of a pericardial  effusion. Pleural Effusion  No evidence of pleural effusion. Aorta / Great Vessels  -Aortic root dimension within normal limits.  -The Pulmonary artery is within normal limits. -IVC size is within normal limits with normal respiratory phasic changes.   M-Mode/2D Measurements & Calculations   LV Diastolic     LV Systolic Dimension: 3.5 cm  AV Cusp Separation: 1.8 Dimension: 4.9   LV Volume Diastolic: 740 ml    cmAO Root Dimension: 3.3  cm               LV Volume Systolic: 51.1 ml    cm  LV FS:28.6 %     LV EDV/LV EDV Index: 113 PL/04  LV PW Diastolic: U^2WD ESV/LV ESV Index: 50.9  1.1 cm           ml/21 m^2  Septum           EF Calculated: 55 %            RV Diastolic Dimension:  Diastolic: 1.1                                  2.9 cm  cm  Doppler Measurements & Calculations   MV Peak E-Wave: 93 cm/s    AV Peak Velocity: 164  LVOT Peak Velocity: 96.8  MV Peak A-Wave: 89.1 cm/s  cm/s                   cm/s  MV E/A Ratio: 1.04         AV Peak Gradient:      LVOT Peak Gradient: 4  MV Peak Gradient: 3.46     10.76 mmHg             mmHg  mmHg                                                    TV Peak E-Wave: 50.6  MV Deceleration Time: 236                         cm/s  msec                                              TV Peak A-Wave: 52.7  MV P1/2t: 69 msec          IVRT: 127 msec         cm/s  MVA by PHT:3.19 cm^2                                                    TV Peak Gradient: 1.02  MV E' Septal Velocity: 5.7 AV DVI (Vmax):0.59     mmHg  cm/s  MV A' Septal Velocity: 8.9                        PV Peak Velocity: 78.8  cm/s                                              cm/s  MV E' Lateral Velocity:                           PV Peak Gradient: 2.48  6.7 cm/s                                          mmHg  MV A' Lateral Velocity:  7.5 cm/s  E/E' septal: 16.32  E/E' lateral: 13.88  [email or web address] [id number]    CT ABDOMEN PELVIS WO CONTRAST Additional Contrast? Radiologist Recommendation    Result Date: 9/6/2021  PROCEDURE: CT ABDOMEN PELVIS WO CONTRAST CLINICAL INFORMATION: Chest and abdominal pain starting this morning, intra-abdominal cause COMPARISON: 11/7/2020 TECHNIQUE:  Axial CT images were obtained through the abdomen and pelvis without contrast Coronal and sagittal reformatted images were rendered. All CT scans at this facility use dose modulation, iterative reconstruction, and/or weight-based dosing when appropriate to reduce radiation dose to as low as reasonably achievable. FINDINGS: Limited evaluation of the lung bases demonstrates mild dependent bibasilar atelectasis. The liver, gallbladder, spleen, pancreas and adrenal glands appear normal. The kidneys demonstrate no evidence of hydronephrosis or hydroureter. No nephrolithiasis or ureterolithiasis is seen. The urinary bladder appears normal. There is no evidence of bowel obstruction. There is no free air or free fluid. There are diffuse soft tissue calcifications involving the fascial planes of the visualized lower chest, abdomen and pelvis with more focal areas of larger coarse calcification demonstrated along the medial aspect of the right gluteus region as well as along the anterolateral left upper quadrant. These calcifications are nonspecific but may be related to dermatomyositis. Recommend clinical correlation. Marked degenerative changes are demonstrated involving the bilateral right greater than left hip joints. There is also bilateral SI joint arthrosis and marked lower lumbar facet arthrosis. 1. There are diffuse soft tissue calcifications involving the fascial planes of the visualized lower chest, abdomen and pelvis with more focal areas of larger coarse calcification demonstrated along the medial aspect of the right gluteus region as well as along the anterolateral left upper quadrant. These calcifications are nonspecific but may be related to dermatomyositis. Recommend clinical correlation. 2. No other acute intra-abdominal or pelvic findings are otherwise seen. **This report has been created using voice recognition software. It may contain minor errors which are inherent in voice recognition technology. ** Final report electronically signed by Dr. Juliano Ortiz on 9/6/2021 4:05 PM    XR CHEST

## 2021-09-08 NOTE — PROGRESS NOTES
Cardiology Progress Note      Patient:  Brenda Skaggs  YOB: 1970  MRN: [id number]   Acct: [de-identified]  Admit Date:  9/6/2021     Primary Cardiologist:  Zaria Pate MD  Rounding cardiologist: Dr. Arzate Linda:  Chest discomfort, shortness of breath.     HPI:  Per Dr. Ruiz Camera consult note of 9/7/21:  HISTORY OF PRESENT ILLNESS:  A pleasant 75-year-old gentleman with  history of morbid obesity and sleep apnea, history of multiple risk  factors for coronary artery disease including diabetes, hypertension,  hyperlipidemia, who has some degree of valvular stenosis with echo last  year showing mild degree of stenosis, and he usually follows with Dr. Budd Koyanagi; comes in with worsening shortness of breath and chest discomfort  type symptoms, has been going on for several days. Heavy pressure  sensation. No specific triggers. No specific radiation. No specific  associated symptoms. So far workup has been pretty much unremarkable  except for borderline cardiac enzymes. We are consulted to assist in  the management of the patient. Chief Complaint: \"Feel much better than I did\". Subjective (Events in last 24 hours):    Stable OVN  Reports large BM today  Denies chest discomfort or dyspnea  Up in room and tolerating activity well      Objective:   BP (!) 139/95   Pulse 70   Temp 97.9 °F (36.6 °C) (Oral)   Resp 18   Ht 5' 8\" (1.727 m)   Wt 284 lb 4.8 oz (129 kg)   SpO2 97%   BMI 43.23 kg/m²        TELEMETRY: NSR 70's    Physical Exam:  General Appearance: alert and oriented to person, place and time, in no acute distress up in chair at bedside  Cardiovascular: normal rate, regular rhythm, normal S1 and S2, no murmurs, rubs, clicks, or gallops, distal pulses intact, no carotid bruits, no JVD  Pulmonary/Chest: clear to auscultation bilaterally- no wheezes, rales or rhonchi, normal air movement, no respiratory distress; RA  Abdomen: obese, soft, non-tender, non-distended, normal bowel sounds, no masses   Extremities: no cyanosis, clubbing or edema, pulses 2+  Skin: warm and dry, no rash or erythema  Head: normocephalic and atraumatic  Eyes: pupils equal, round, and reactive to light  Neck: supple and non-tender without mass, no thyromegaly   Musculoskeletal: normal range of motion, no joint swelling, deformity or tenderness  Neurological: alert, oriented, normal speech, no focal findings or movement disorder noted    Medications:    docusate sodium  100 mg Oral BID    clotrimazole-betamethasone   Topical BID    sodium chloride flush  5-40 mL IntraVENous 2 times per day    aspirin  81 mg Oral Daily    allopurinol  300 mg Oral Daily    atorvastatin  40 mg Oral Nightly    ARIPiprazole  15 mg Oral Daily    folic acid  1 mg Oral Daily    ferrous sulfate  325 mg Oral BID WC    furosemide  40 mg Oral Daily    busPIRone  10 mg Oral BID    citalopram  30 mg Oral Daily    insulin glargine  10 Units SubCUTAneous Nightly    hydrALAZINE  50 mg Oral BID    tiotropium-olodaterol  2 puff Inhalation Daily    pantoprazole  40 mg Oral Daily    potassium chloride  20 mEq Oral Daily    vitamin C  1,000 mg Oral Daily    tamsulosin  0.4 mg Oral Nightly    therapeutic multivitamin-minerals  1 tablet Oral Daily    insulin lispro  0-12 Units SubCUTAneous TID WC    insulin lispro  0-6 Units SubCUTAneous Nightly      sodium chloride      dextrose       nitroGLYCERIN, 0.4 mg, Q5 Min PRN  sodium chloride flush, 5-40 mL, PRN  sodium chloride, 25 mL, PRN  ondansetron, 4 mg, Q8H PRN   Or  ondansetron, 4 mg, Q6H PRN  acetaminophen, 650 mg, Q6H PRN   Or  acetaminophen, 650 mg, Q6H PRN  polyethylene glycol, 17 g, Daily PRN  magnesium sulfate, 2,000 mg, PRN  potassium chloride, 40 mEq, PRN   Or  potassium alternative oral replacement, 40 mEq, PRN   Or  potassium chloride, 10 mEq, PRN  albuterol sulfate HFA, 2 puff, Q6H PRN  glucose, 15 g, PRN  dextrose, 12.5 g, PRN  glucagon (rDNA), 1 mg, PRN  dextrose, 100 mL/hr, PRN  morphine, 2 mg, Q4H PRN        Diagnostics:  CxR:  PROCEDURE: XR CHEST PORTABLE       CLINICAL INFORMATION: Chest Pain        COMPARISON: 11/13/2020       TECHNIQUE:  AP mobile chest         FINDINGS:        The heart size is within normal limits. Patchy airspace disease at the medial right lung base is demonstrated which may represent atelectasis or pneumonia. No pleural effusion or pneumothorax is seen. No acute osseous findings are seen.           Impression   1. Mild patchy airspace disease at the medial right lung base may represent atelectasis or pneumonia.               **This report has been created using voice recognition software.  It may contain minor errors which are inherent in voice recognition technology. **       Final report electronically signed by Dr. Kian Nguyen on 9/6/2021 2:11 PM     9/6/21: CT abd/pelvis  PROCEDURE: CT ABDOMEN PELVIS WO CONTRAST       CLINICAL INFORMATION: Chest and abdominal pain starting this morning, intra-abdominal cause        COMPARISON: 11/7/2020       TECHNIQUE:  Axial CT images were obtained through the abdomen and pelvis without contrast Coronal and sagittal reformatted images were rendered. All CT scans at this facility use dose modulation, iterative reconstruction, and/or weight-based dosing when    appropriate to reduce radiation dose to as low as reasonably achievable.       FINDINGS:        Limited evaluation of the lung bases demonstrates mild dependent bibasilar atelectasis.       The liver, gallbladder, spleen, pancreas and adrenal glands appear normal.       The kidneys demonstrate no evidence of hydronephrosis or hydroureter.  No nephrolithiasis or ureterolithiasis is seen.       The urinary bladder appears normal.       There is no evidence of bowel obstruction.       There is no free air or free fluid.       There are diffuse soft tissue calcifications involving the fascial planes of the visualized lower chest, abdomen and pelvis with more focal areas of larger coarse calcification demonstrated along the medial aspect of the right gluteus region as well as    along the anterolateral left upper quadrant. These calcifications are nonspecific but may be related to dermatomyositis. Recommend clinical correlation.       Marked degenerative changes are demonstrated involving the bilateral right greater than left hip joints. There is also bilateral SI joint arthrosis and marked lower lumbar facet arthrosis.         Impression   1. There are diffuse soft tissue calcifications involving the fascial planes of the visualized lower chest, abdomen and pelvis with more focal areas of larger coarse calcification demonstrated along the medial aspect of the right gluteus region as well    as along the anterolateral left upper quadrant. These calcifications are nonspecific but may be related to dermatomyositis. Recommend clinical correlation.       2. No other acute intra-abdominal or pelvic findings are otherwise seen.           **This report has been created using voice recognition software.  It may contain minor errors which are inherent in voice recognition technology. **           EK/6/21:  Narrative & Impression    Normal sinus rhythm  Left axis deviation  Nonspecific T wave abnormality  Abnormal ECG  When compared with ECG of 06-SEP-2021 12:39,  No significant change was found  Confirmed by Cherry Soto ([id number]) on 2021 6:56:43 AM      Specimen Collected: 21 03:42            Echo: 21  Transthoracic Echocardiography Report (TTE)      Demographics      Patient Name   Suzi Alicea Gender               Male                  D      MR #           [id number]         Race                 Black                                       Ethnicity      Account #      [de-identified]         Room Number          0012      Accession      [id number]        Date of Study        2021   Number      Date of Birth  1970        Referring Physician Trudy Martinez MD      Age            44 year(s)        Radha Munoz [facility]                                       Interpreting         Trudy Martinez MD                                    Physician     Procedure     Type of Study      TTE procedure:ECHOCARDIOGRAM COMPLETE 2D W DOPPLER W COLOR. Procedure Date  Date: 09/07/2021 Start: 02:00 PM     Study Location: Bedside  Technical Quality: Limited visualization due to body habitus.     Indications: Aortic stenosis.     Additional Medical History:A fib, Diabetes, CHF, ETOH abuse, Morbid obesity,  KIARA, Anemia, COVID, GERD     Patient Status: Routine     Height: 68.11 inches Weight: 288. 81 pounds BSA: 2.39 m^2 BMI: 43.77 kg/m^2     BP: 160/84 mmHg      Conclusions      Summary   Ejection fraction is visually estimated at 55%. Overall left ventricular function is normal.      Signature      ----------------------------------------------------------------   Electronically signed by Trudy Martinez MD (Interpreting   physician) on 09/07/2021 at 03:43 PM   ----------------------------------------------------------------      Findings      Mitral Valve   The mitral valve structure was normal with normal leaflet separation. DOPPLER: The transmitral velocity was within the normal range with no   evidence for mitral stenosis. There was no evidence of mitral   regurgitation. Aortic Valve   The aortic valve was trileaflet with normal thickness and cuspal   separation. DOPPLER: Transaortic velocity was within the normal range with   no evidence of aortic stenosis. There was no evidence of aortic   regurgitation. Tricuspid Valve   The tricuspid valve structure was normal with normal leaflet separation. DOPPLER: There was no evidence of tricuspid stenosis. There was no   evidence of tricuspid regurgitation. Pulmonic Valve   The pulmonic valve was not well visualized . Trivial pulmonic regurgitation visualized.       Left Atrium Left atrial size was normal.      Left Ventricle   Ejection fraction is visually estimated at 55%. Overall left ventricular function is normal.      Right Atrium   Right atrial size was normal.      Right Ventricle   The right ventricular size was normal with normal systolic function and   wall thickness. Pericardial Effusion   The pericardium was normal in appearance with no evidence of a pericardial   effusion. Pleural Effusion   No evidence of pleural effusion. Aorta / Great Vessels   -Aortic root dimension within normal limits.   -The Pulmonary artery is within normal limits. -IVC size is within normal limits with normal respiratory phasic changes.      M-Mode/2D Measurements & Calculations      LV Diastolic     LV Systolic Dimension: 3.5 cm  AV Cusp Separation: 1.8   Dimension: 4.9   LV Volume Diastolic: 151 ml    cmAO Root Dimension: 3.3   cm               LV Volume Systolic: 00.7 ml    cm   LV FS:28.6 %     LV EDV/LV EDV Index: 113 YY/10   LV PW Diastolic: A^1QC ESV/LV ESV Index: 50.9   1.1 cm           ml/21 m^2   Septum           EF Calculated: 55 %            RV Diastolic Dimension:   Diastolic: 1.1                                  2.9 cm   cm     Doppler Measurements & Calculations      MV Peak E-Wave: 93 cm/s    AV Peak Velocity: 164  LVOT Peak Velocity: 96.8   MV Peak A-Wave: 89.1 cm/s  cm/s                   cm/s   MV E/A Ratio: 1.04         AV Peak Gradient:      LVOT Peak Gradient: 4   MV Peak Gradient: 3.46     10.76 mmHg             mmHg   mmHg                                                     TV Peak E-Wave: 50.6   MV Deceleration Time: 236                         cm/s   msec                                              TV Peak A-Wave: 52.7   MV P1/2t: 69 msec          IVRT: 127 msec         cm/s   MVA by PHT:3.19 cm^2                                                     TV Peak Gradient: 1.02   MV E' Septal Velocity: 5.7 AV DVI (Vmax):0.59     mmHg   cm/s   MV A' Septal Velocity: 8.9                        PV Peak Velocity: 78.8   cm/s                                              cm/s   MV E' Lateral Velocity:                           PV Peak Gradient: 2.48   6.7 cm/s                                          mmHg   MV A' Lateral Velocity:   7.5 cm/s   E/E' septal: 16.32   E/E' lateral: 13.88     [id number]         Stress: negative  9/7/21: Lexiscan stress  ECG Findings   Nonspecific ST-T wave changes. Complications   Procedure complication was none. Imaging Protocols      Rest                                Stress      Isotope:Tc-99m Sestamibi            Isotope: Tc-99m Sestamibi   Isotope dose:8.9 mCi                Isotope dose:29.7 mCi   Date:09/07/2021 11:05               Date:09/07/2021 12:10      Technique:           SPECT          Technique:           Gated                                                            SPECT     Imaging Results:Calculated gated LVEF 58 %. patchy uptake pattern  no obvious ischemia  normal EF      Conclusions      Summary   This Nuclear Medicine study was negative for ischemia . difficult scan due to patient body habitus   normal EF      Recommendation   Medical management. Left Heart Cath: none    Lab Data:    Cardiac Enzymes:  Results for Pedrito Ferreira (MRN [id number]) as of 9/8/2021 12:16   Ref.  Range 9/6/2021 13:27 9/6/2021 23:42 9/7/2021 05:42   Troponin T Latest Units: ng/ml 0.011 (A) 0.015 (A) 0.017 (A)       CBC:   Lab Results   Component Value Date    WBC 9.4 09/07/2021    RBC 5.27 09/07/2021    RBC 4.55 04/15/2012    HGB 15.8 09/07/2021    HCT 48.1 09/07/2021     09/07/2021       CMP:    Lab Results   Component Value Date     09/07/2021    K 4.3 09/07/2021    K 4.9 09/06/2021     09/07/2021    CO2 25 09/07/2021    BUN 16 09/07/2021    CREATININE 1.0 09/07/2021    GFRAA >60 01/23/2019    AGRATIO 0.8 06/24/2018    LABGLOM >90 09/07/2021    GLUCOSE 216 09/07/2021    GLUCOSE 113 06/24/2018    CALCIUM 9.2 09/07/2021       Hepatic Function Panel:    Lab Results   Component Value Date    ALKPHOS 191 09/06/2021    ALT 27 09/06/2021    AST 28 09/06/2021    PROT 6.7 09/06/2021    BILITOT 0.4 09/06/2021    BILIDIR <0.2 09/06/2021    LABALBU 4.1 09/06/2021    LABALBU 4.4 01/26/2012       Magnesium:    Lab Results   Component Value Date    MG 1.4 11/19/2020       PT/INR:    Lab Results   Component Value Date    PROTIME 24.4 06/27/2018    INR 1.14 11/07/2020       HgBA1c:    Lab Results   Component Value Date    LABA1C 7.3 09/06/2021       FLP:  No results found for: TRIG, HDL, LDLCALC, LDLDIRECT, LABVLDL    TSH:  No results found for: TSH      Assessment/Plan:  · Chest pain - elevated troponin  · EKG with non-specific T wave changes  · Troponin with mild elevation  · Echo: EF 55%, nml LVF; no significant findings  · Lexiscan stress; negative for ischemia  · No chest discomfort or dyspnea; tolerating activity  · HTN - BP controlled on current meds  · HLD - Lipitor 40 QHS  · DM - management per primary service; HgbA1C 7.3  · Obesity - diet and lifestyle modifications  · Sleep apnea - refer to pulmonology for OP sleep study  · Arthritis     Cardiology will sign off at this time. No additional work-up planned. Recommend OP sleep study. Please call with questions or concerns. Follow-up in office with Dr. Mihir Lew in 4 weeks.      Electronically signed by SANDRA Jay CNP on 9/8/2021 at 12:07 PM

## 2021-09-08 NOTE — CARE COORDINATION
Discharge Planning Update:    Zeferino Koenig has constipation and nausea. Colace and Dulcolax ordered. Plan discharge tomorrow. Home with wife. Denies needs.   Electronically signed by Tanner Waters RN on 9/8/2021 at 2:38 PM

## 2021-09-09 VITALS
HEIGHT: 68 IN | TEMPERATURE: 98.4 F | OXYGEN SATURATION: 91 % | SYSTOLIC BLOOD PRESSURE: 138 MMHG | HEART RATE: 78 BPM | WEIGHT: 284.4 LBS | BODY MASS INDEX: 43.1 KG/M2 | RESPIRATION RATE: 18 BRPM | DIASTOLIC BLOOD PRESSURE: 86 MMHG

## 2021-09-09 LAB
GLUCOSE BLD-MCNC: 144 MG/DL (ref 70–108)
GLUCOSE BLD-MCNC: 161 MG/DL (ref 70–108)

## 2021-09-09 PROCEDURE — 6370000000 HC RX 637 (ALT 250 FOR IP): Performed by: PHYSICIAN ASSISTANT

## 2021-09-09 PROCEDURE — 2580000003 HC RX 258: Performed by: PHYSICIAN ASSISTANT

## 2021-09-09 PROCEDURE — 82948 REAGENT STRIP/BLOOD GLUCOSE: CPT

## 2021-09-09 PROCEDURE — 6370000000 HC RX 637 (ALT 250 FOR IP): Performed by: FAMILY MEDICINE

## 2021-09-09 PROCEDURE — 99217 PR OBSERVATION CARE DISCHARGE MANAGEMENT: CPT | Performed by: FAMILY MEDICINE

## 2021-09-09 PROCEDURE — G0378 HOSPITAL OBSERVATION PER HR: HCPCS

## 2021-09-09 RX ORDER — POLYETHYLENE GLYCOL 3350 17 G/17G
17 POWDER, FOR SOLUTION ORAL DAILY PRN
Qty: 527 G | Refills: 0 | Status: SHIPPED | OUTPATIENT
Start: 2021-09-09 | End: 2021-10-09

## 2021-09-09 RX ORDER — CLOTRIMAZOLE AND BETAMETHASONE DIPROPIONATE 10; .64 MG/G; MG/G
CREAM TOPICAL
Qty: 1 EACH | Refills: 0 | Status: SHIPPED | OUTPATIENT
Start: 2021-09-09 | End: 2022-09-06

## 2021-09-09 RX ADMIN — BUSPIRONE HYDROCHLORIDE 10 MG: 10 TABLET ORAL at 08:53

## 2021-09-09 RX ADMIN — Medication 1 TABLET: at 08:52

## 2021-09-09 RX ADMIN — FUROSEMIDE 40 MG: 40 TABLET ORAL at 08:52

## 2021-09-09 RX ADMIN — OXYCODONE HYDROCHLORIDE AND ACETAMINOPHEN 1000 MG: 500 TABLET ORAL at 08:53

## 2021-09-09 RX ADMIN — PANTOPRAZOLE SODIUM 40 MG: 40 TABLET, DELAYED RELEASE ORAL at 08:52

## 2021-09-09 RX ADMIN — DOCUSATE SODIUM 100 MG: 100 CAPSULE ORAL at 08:52

## 2021-09-09 RX ADMIN — CITALOPRAM 30 MG: 20 TABLET, FILM COATED ORAL at 08:53

## 2021-09-09 RX ADMIN — CLOTRIMAZOLE AND BETAMETHASONE DIPROPIONATE: 10; .5 CREAM TOPICAL at 08:53

## 2021-09-09 RX ADMIN — FERROUS SULFATE TAB 325 MG (65 MG ELEMENTAL FE) 325 MG: 325 (65 FE) TAB at 08:53

## 2021-09-09 RX ADMIN — SODIUM CHLORIDE, PRESERVATIVE FREE 10 ML: 5 INJECTION INTRAVENOUS at 08:53

## 2021-09-09 RX ADMIN — POTASSIUM CHLORIDE 20 MEQ: 1500 TABLET, EXTENDED RELEASE ORAL at 08:52

## 2021-09-09 RX ADMIN — FOLIC ACID 1 MG: 1 TABLET ORAL at 08:53

## 2021-09-09 RX ADMIN — HYDRALAZINE HYDROCHLORIDE 50 MG: 50 TABLET, FILM COATED ORAL at 08:52

## 2021-09-09 RX ADMIN — ASPIRIN 81 MG: 81 TABLET, CHEWABLE ORAL at 08:52

## 2021-09-09 RX ADMIN — ARIPIPRAZOLE 15 MG: 15 TABLET ORAL at 08:53

## 2021-09-09 RX ADMIN — ALLOPURINOL 300 MG: 300 TABLET ORAL at 08:53

## 2021-09-09 ASSESSMENT — PAIN DESCRIPTION - DESCRIPTORS: DESCRIPTORS: SHARP;ACHING

## 2021-09-09 ASSESSMENT — PAIN DESCRIPTION - ORIENTATION: ORIENTATION: MID;UPPER

## 2021-09-09 ASSESSMENT — PAIN - FUNCTIONAL ASSESSMENT: PAIN_FUNCTIONAL_ASSESSMENT: ACTIVITIES ARE NOT PREVENTED

## 2021-09-09 ASSESSMENT — PAIN DESCRIPTION - LOCATION: LOCATION: ABDOMEN

## 2021-09-09 ASSESSMENT — PAIN DESCRIPTION - PROGRESSION: CLINICAL_PROGRESSION: NOT CHANGED

## 2021-09-09 ASSESSMENT — PAIN DESCRIPTION - ONSET: ONSET: ON-GOING

## 2021-09-09 ASSESSMENT — PAIN SCALES - GENERAL
PAINLEVEL_OUTOF10: 0
PAINLEVEL_OUTOF10: 3

## 2021-09-09 ASSESSMENT — PAIN DESCRIPTION - FREQUENCY: FREQUENCY: INTERMITTENT

## 2021-09-09 ASSESSMENT — PAIN DESCRIPTION - PAIN TYPE: TYPE: ACUTE PAIN

## 2021-09-09 NOTE — PROGRESS NOTES
Discharge teaching and instructions for diagnosis/procedure of Chest Pain completed with patient using teachback method. AVS reviewed. Printed prescriptions given to patient. Patient voiced understanding regarding prescriptions, follow up appointments, and care of self at home.  Discharged in a wheelchair to  home with support per self

## 2021-09-09 NOTE — DISCHARGE SUMMARY
Hospitalist Discharge Summary        Patient: Timo Gracia  YOB: 1970  MRN: [id number]   Acct: [de-identified]    Primary Care Physician: SANDRA Whitlock CNP    Admit date  9/6/2021    Discharge date:  9/9/2021 1:31 PM  Discharge Diagnoses: Active Hospital Problems    Diagnosis Date Noted    Elevated troponin [R77.8]      Priority: High    Chest pain [R07.9] 09/06/2021       Code Status:  Full Code     Chief Complaint on presentation :-Atypical chest pain      Initial admission HPI as below:-  Pt is a 45 yo male with PMH of AS, BPH, DM II, GERD, CHF, CKD, essential HTN, KIARA on BiPAP, Afib, HLD, and severe obesity presents to [facility] ED with chief complaint of chest pain and SOB that began this am around 0930. Hx obtained via pt and chart review.      Pt states that upon waking this am he began to experience a \"heaviness\" centrally located in his chest. Pt reports that after a couple of minutes this feeling radiated down into his epigastric area and bilaterally across his upper quadrants where it felt that he was \"being kicked in the stomach\". Pt rated this pain at 6/10 in severity. Pt reports associated SOB, denies any nausea, vomiting, or diarrhea. Pt states that this lasted for \"a while\" but that it resolved. Pt states he did not try any medication or intervention. Pt notes that this pain in his stomach is intermittent and is worse upon taking a deep breath. Pt also reports swelling in his LLE that has been present since he was diagnosed with a blood clot in that leg and treated with [address] [place] [address] Road which pt does not know if he is still taking. This swelling is unchanged and pt also notes that he has chronic weakness in his LLE since then and has to utilize a cane to walk. Pt is to f/u with Neurology (Dr. Wilbert Wilson) for a some test that pt does not know the name of. Pt denies current CP at this time.      In ED, pt was given ASA and a dose of nitro which improved pts symptoms.  Pt admitted to [facility] for observation.     Hospital problem list with assessment and plan updates:-  9/8/2021: Has eczema bilateral forearms in addition to possible fungal infection which is chronic, instructed to moisture the skin with Vaseline in addition to that I will prescribe Lotrisone cream to use twice daily, has constipation and feeling nauseous slightly, give Dulcolax suppository in addition to Colace twice daily, then use as needed when improved, does not have any chest pain currently he mentioned it is improved, cardiology on the case. The patient does not feel comfortable to go home today, we will keep until possibly tomorrow discharge.     Atypical chest pain: centrally located chest heaviness, relief with nitro per ED. EKG showed NSR, initial trop 0.011 trend up to 0.015, 0.017. Tele monitor. Continue with ASA / statin. Cardiology consulted.  Echocardiogram ordered.  Patient taken for stress test.   1. Abd pain, unclear etiology: Patient complaining of epigastric abdominal pain that radiates bilaterally in upper quadrants.  CT A/P unremarkable.  Patient had episode of associated nausea and emesis after eating last night.  Improved with GI cocktail.  Patient on Protonix daily.  If continues, will consult GI.  2. Undifferentiated dyspnea: without hypoxia. Of note, pt chronically SOB per chart review. CXR showed mild patchy airspace disease at medial R lung base which could represent atelectasis or PNA. Afebrile, no leukocytosis. Low suspicion for PNA. BNP wnl, no rales on exam. Encourage incentive spirometer. 3. LLE edema: chronic, +2 pitting edema. Pt notes this is near baseline. BNP wnl. Pt on Lasix at home, continue and monitor. 4. Chronic HFpEF: no overt decompensation noted. ECHO with EF 60% from 09/2020. Strict I/Os, daily weights. On diuretic at home. Low Na diet.   5. Chronic low back pain: hx of undifferentiated axial spondyloarthritis. Nicole Rivera Hx of dermatomyositis and inflammatory arthritis.  Follows with rheumatology who per most recent visit dated 9/2/2021 planned to start anti-TNF vs IL17 pending TB gold. 6. IDDM II, uncontrolled: resume home Lantus, SSI. Hgb A1c noted at 9.9. ACCU. Carb control. Hypoglycemia protocol in place. 7. Essential HTN: BP elevated on admission, continue home meds and monitor to adjust as needed   8. Hx of DVT: in LLE while pt was admitted with COVID in 9/2020. Continue with Eliquis. No erythema, TTP.   9. Chronic L foot drop: follow up planned with Dr. Katia Radford in Neuro as outpatient. PT/OT  10. KIARA not on CPAP: pt states he has f/u at the end of this month to obtain a machine  11. Hx of gout: Allopurinol   12. CKD stage I: Cr stable, avoid nephrotoxic agents. 13. Severe obesity: BMI 44.64        PMH, PSH, SH, FHX reviewed in chart review snap shot in EPIC    Additional discharge final recommendations as below:-  Lexiscan stress test rule out ischemia, cardiology signed off, we strongly instructed the patient to get sleep study as an outpatient, patient clearly has sleep apnea because he he does have multiple times while we are talking to him and he snores a lot, sleep study ordered and provided to the patient, Lotrisone prescribed for xerostomia and eczema with possible fungal infection of the skin, also we prescribed good bowel regimen for constipation which is chronic, patient had bowel movement the day before discharge. Here to mention that patient has increased risk of hospital readmission because of current comorbidities. Discharge Nurse Ruchi Adams RN) note at the time of discharge as below:-  Discharge teaching and instructions for diagnosis/procedure of Chest Pain completed with patient using teachback method. AVS reviewed. Printed prescriptions given to patient. Patient voiced understanding regarding prescriptions, follow up appointments, and care of self at home.  Discharged in a wheelchair to  home with support per self    All appointments obtained for the patient at the day of discharge with other specialities including PCP in one week. All questions and concerns addressed. Patient verbalized understandings and was agreeable with discharge planning. Physical Exam:-  Vitals:   Patient Vitals for the past 24 hrs:   BP Temp Temp src Pulse Resp SpO2 Weight   09/09/21 1125 138/86 98.4 °F (36.9 °C) Oral 78 18 91 % --   09/09/21 0834 (!) 155/95 98 °F (36.7 °C) Oral 71 18 95 % --   09/09/21 0434 -- -- -- -- -- -- 284 lb 6.4 oz (129 kg)   09/09/21 0422 (!) 147/92 98 °F (36.7 °C) Oral 75 20 91 % --   09/08/21 2053 (!) 174/88 98.7 °F (37.1 °C) Oral 63 20 95 % --     Weight:   Weight: 284 lb 6.4 oz (129 kg)   24 hour intake/output:     Intake/Output Summary (Last 24 hours) at 9/9/2021 1331  Last data filed at 9/9/2021 1125  Gross per 24 hour   Intake 500 ml   Output 0 ml   Net 500 ml       1. General appearance: No apparent distress, appears stated age and cooperative. Morbidly obese, snoring while talking to him, dozing off while talking to him  2. HEENT: Normal cephalic, atraumatic without obvious deformity. Pupils equal, round, and reactive to light. Extra ocular muscles intact. Conjunctivae/corneas clear. 3. Neck: Wide neck, supple, with full range of motion. No jugular venous distention. Trachea midline. 4. Respiratory:  Normal respiratory effort. Clear to auscultation, bilaterally without Rales/Wheezes/Rhonchi. 5. Cardiovascular: Regular rate and rhythm with normal S1/S2 without murmurs, rubs or gallops. 6. Abdomen: Soft, increased abdominal girth, non-tender, non-distended with normal bowel sounds. 7. Musculoskeletal:  No clubbing, cyanosis or edema bilaterally. 8. Skin: Skin color, texture, turgor normal.  No rashes or lesions. 9. Neurologic:  Neurovascularly intact without any focal sensory/motor deficits. Cranial nerves: II-XII intact, grossly non-focal.  10. Psychiatric: Alert and oriented, thought content appropriate, normal insight  11.  Capillary Refill: Brisk,< 3 seconds   12. Peripheral Pulses: +2 palpable, equal bilaterally       Discharge Medications:-      Medication List      START taking these medications    clotrimazole-betamethasone 1-0.05 % cream  Commonly known as: LOTRISONE  Apply topically 2 times daily. polyethylene glycol 17 g packet  Commonly known as: GLYCOLAX  Take 17 g by mouth daily as needed for Constipation        CONTINUE taking these medications    ABILIFY PO     acetaminophen 325 MG tablet  Commonly known as: TYLENOL     albuterol sulfate  (90 Base) MCG/ACT inhaler  Commonly known as: Proventil HFA  Inhale 2 puffs into the lungs every 6 hours as needed for Wheezing     allopurinol 300 MG tablet  Commonly known as: ZYLOPRIM  Take 1 tablet by mouth daily     apixaban 5 MG Tabs tablet  Commonly known as: ELIQUIS  Take 10mg by mouth two times daily for 6 days followed by 5mg by mouth two times daily     atorvastatin 40 MG tablet  Commonly known as: LIPITOR     busPIRone 10 MG tablet  Commonly known as: BUSPAR     CELEXA PO     CHOLECALCIFEROL PO     ferrous sulfate 325 (65 Fe) MG tablet  Commonly known as: IRON 325  Take 1 tablet by mouth 2 times daily (with meals)     folic acid 1 MG tablet  Commonly known as: [address] [place] [address]  Patient needs all supplies for qd testing. DX: 250.00     furosemide 40 MG tablet  Commonly known as: LASIX     gabapentin 400 MG capsule  Commonly known as: NEURONTIN  Take 1 capsule by mouth 2 times daily for 180 days. glycopyrrolate-formoterol 9-4.8 MCG/ACT Aero  Commonly known as: BEVESPI  Inhale 2 puffs into the lungs 2 times daily     hydrALAZINE 50 MG tablet  Commonly known as: APRESOLINE     HYDROcodone-acetaminophen 5-325 MG per tablet  Commonly known as: NORCO  Take 1 tablet by mouth every 6 hours as needed for Pain for up to 30 days.      insulin glargine 100 UNIT/ML injection vial  Commonly known as: LANTUS     insulin lispro 100 UNIT/ML injection vial  Commonly known as: HUMALOG     Janumet  MG per tablet  Generic drug: sitaGLIPtan-metFORMIN     ondansetron 4 MG tablet  Commonly known as: ZOFRAN     pantoprazole 40 MG tablet  Commonly known as: PROTONIX  Take 1 tablet by mouth daily     potassium chloride 20 MEQ extended release tablet  Commonly known as: KLOR-CON M  Take 1 tablet by mouth daily     SOBIA-BID PROBIOTIC PO     senna 8.6 MG tablet  Commonly known as: SENOKOT     tamsulosin 0.4 MG capsule  Commonly known as: FLOMAX     therapeutic multivitamin-minerals tablet     vitamin C 500 MG tablet  Commonly known as: ASCORBIC ACID  Take 2 tablets by mouth daily           Where to Get Your Medications      These medications were sent to [facility] [phone] - f [phone]  [address] [place] [id number]    Phone: [phone]   · clotrimazole-betamethasone 1-0.05 % cream  · polyethylene glycol 17 g packet          Labs :-  Recent Results (from the past 72 hour(s))   COVID-19, Rapid    Collection Time: 09/06/21  2:10 PM    Specimen: Nasopharyngeal Swab   Result Value Ref Range    SARS-CoV-2, NAAT NOT DETECTED NOT DETECTED   CBC Auto Differential    Collection Time: 09/06/21  4:13 PM   Result Value Ref Range    WBC 10.7 4.8 - 10.8 thou/mm3    RBC 5.35 4.70 - 6.10 mill/mm3    Hemoglobin 16.1 14.0 - 18.0 gm/dl    Hematocrit 48.6 42.0 - 52.0 %    MCV 90.8 80.0 - 94.0 fL    MCH 30.1 26.0 - 33.0 pg    MCHC 33.1 32.2 - 35.5 gm/dl    RDW-CV 13.6 11.5 - 14.5 %    RDW-SD 45.1 (H) 35.0 - 45.0 fL    Platelets 471 147 - 201 thou/mm3    MPV 10.6 9.4 - 12.4 fL    Seg Neutrophils 73.2 %    Lymphocytes 15.9 %    Monocytes 7.8 %    Eosinophils 2.0 %    Basophils 0.5 %    Immature Granulocytes 0.6 %    Segs Absolute 7.8 (H) 1 - 7 thou/mm3    Lymphocytes Absolute 1.7 1.0 - 4.8 thou/mm3    Monocytes Absolute 0.8 0.4 - 1.3 thou/mm3    Eosinophils Absolute 0.2 0.0 - 0.4 thou/mm3    Basophils Absolute 0.1 0.0 - 0.1 thou/mm3 Immature Grans (Abs) 0.06 0.00 - 0.07 thou/mm3    nRBC 0 /100 wbc   D-dimer, quantitative    Collection Time: 09/06/21  4:13 PM   Result Value Ref Range    D-Dimer, Quant 1133.00 (H) 0.00 - 500.00 ng/ml FEU   POCT glucose    Collection Time: 09/06/21  9:44 PM   Result Value Ref Range    POC Glucose 133 (H) 70 - 108 mg/dl   Hemoglobin A1c    Collection Time: 09/06/21 11:42 PM   Result Value Ref Range    Hemoglobin A1C 7.3 (H) 4.4 - 6.4 %    AVERAGE GLUCOSE 159 (H) 70 - 126 mg/dL   Troponin    Collection Time: 09/06/21 11:42 PM   Result Value Ref Range    Troponin T 0.015 (A) ng/ml   SPECIMEN REJECTION    Collection Time: 09/07/21 12:54 AM   Result Value Ref Range    Rejected Test Tropt     Reason for Rejection see below    EKG 12 lead    Collection Time: 09/07/21  3:42 AM   Result Value Ref Range    Ventricular Rate 72 BPM    Atrial Rate 72 BPM    P-R Interval 152 ms    QRS Duration 82 ms    Q-T Interval 400 ms    QTc Calculation (Bazett) 438 ms    P Axis 61 degrees    R Axis -32 degrees    T Axis -27 degrees   Troponin    Collection Time: 09/07/21  5:42 AM   Result Value Ref Range    Troponin T 0.017 (A) ng/ml   CBC    Collection Time: 09/07/21  5:42 AM   Result Value Ref Range    WBC 9.4 4.8 - 10.8 thou/mm3    RBC 5.27 4.70 - 6.10 mill/mm3    Hemoglobin 15.8 14.0 - 18.0 gm/dl    Hematocrit 48.1 42.0 - 52.0 %    MCV 91.3 80.0 - 94.0 fL    MCH 30.0 26.0 - 33.0 pg    MCHC 32.8 32.2 - 35.5 gm/dl    RDW-CV 13.9 11.5 - 14.5 %    RDW-SD 45.9 (H) 35.0 - 45.0 fL    Platelets 620 919 - 010 thou/mm3    MPV 10.6 9.4 - 12.4 fL   POCT glucose    Collection Time: 09/07/21  1:23 PM   Result Value Ref Range    POC Glucose 210 (H) 70 - 108 mg/dl   Basic Metabolic Panel    Collection Time: 09/07/21  1:54 PM   Result Value Ref Range    Sodium 136 135 - 145 meq/L    Potassium 4.3 3.5 - 5.2 meq/L    Chloride 100 98 - 111 meq/L    CO2 25 23 - 33 meq/L    Glucose 216 (H) 70 - 108 mg/dL    BUN 16 7 - 22 mg/dL    CREATININE 1.0 0.4 - 1.2 mg/dL    Calcium 9.2 8.5 - 10.5 mg/dL   Anion Gap    Collection Time: 09/07/21  1:54 PM   Result Value Ref Range    Anion Gap 11.0 8.0 - 16.0 meq/L   Glomerular Filtration Rate, Estimated    Collection Time: 09/07/21  1:54 PM   Result Value Ref Range    Est, Glom Filt Rate >90 ml/min/1.73m2   POCT glucose    Collection Time: 09/07/21  3:36 PM   Result Value Ref Range    POC Glucose 148 (H) 70 - 108 mg/dl   POCT glucose    Collection Time: 09/07/21  9:32 PM   Result Value Ref Range    POC Glucose 140 (H) 70 - 108 mg/dl   POCT glucose    Collection Time: 09/08/21  6:17 AM   Result Value Ref Range    POC Glucose 194 (H) 70 - 108 mg/dl   POCT glucose    Collection Time: 09/08/21 10:37 AM   Result Value Ref Range    POC Glucose 169 (H) 70 - 108 mg/dl   POCT glucose    Collection Time: 09/08/21  3:04 PM   Result Value Ref Range    POC Glucose 172 (H) 70 - 108 mg/dl   POCT glucose    Collection Time: 09/08/21  8:07 PM   Result Value Ref Range    POC Glucose 149 (H) 70 - 108 mg/dl   POCT glucose    Collection Time: 09/09/21  6:06 AM   Result Value Ref Range    POC Glucose 161 (H) 70 - 108 mg/dl   POCT glucose    Collection Time: 09/09/21 11:22 AM   Result Value Ref Range    POC Glucose 144 (H) 70 - 108 mg/dl        Microbiology:    Blood culture #1:   Lab Results   Component Value Date    BC No growth-preliminary No growth  10/28/2020       Blood culture #2:No results found for: BLOODCULT2    Organism:    Lab Results   Component Value Date    LABGRAM  10/12/2020     Moderate segmented neutrophils observed. Rare epithelial cells observed. Rare gram positive cocci occurring singly and in pairs. MRSA culture only:No results found for: [facility]    Urine culture:   Lab Results   Component Value Date    Flavia Ruiz  10/06/2020     At least one of drugs in current antibiotic therapy is ineffective in vitro for isolate.      LABURIN [place] count: >100,000 CFU/mL   10/06/2020     Lab Results   Component Value Date    [facility] Staphylococcus (coagulase negative) 10/22/2020        Respiratory culture: No results found for: CULTRESP    Aerobic and Anaerobic :  No results found for: LABAERO  No results found for: LABANAE    Urinalysis:      Lab Results   Component Value Date    NITRU NEGATIVE 11/03/2020    WBCUA 25-50 11/03/2020    BACTERIA FEW 11/03/2020    RBCUA 3-5 11/03/2020    BLOODU NEGATIVE 11/03/2020    SPECGRAV >=1.030 10/06/2020    GLUCOSEU NEGATIVE 11/03/2020       Radiology:-  Echo Complete    Result Date: 9/7/2021  Transthoracic Echocardiography Report (TTE)  Demographics   Patient Name   Sue Lei Gender               Male                 D   MR #           [id number]         Race                 Black                                    Ethnicity   Account #      [de-identified]         Room Number          0012   Accession      [id number]        Date of Study        09/07/2021  Number   Date of Birth  1970        Referring Physician  Ana Norman MD   Age            44 year(s)        [facility]                                    Interpreting         Ana Norman MD                                   Physician  Procedure Type of Study   TTE procedure:ECHOCARDIOGRAM COMPLETE 2D W DOPPLER W COLOR. Procedure Date Date: 09/07/2021 Start: 02:00 PM Study Location: Bedside Technical Quality: Limited visualization due to body habitus. Indications: Aortic stenosis. Additional Medical History:A fib, Diabetes, CHF, ETOH abuse, Morbid obesity, KIARA, Anemia, COVID, GERD Patient Status: Routine Height: 68.11 inches Weight: 288. 81 pounds BSA: 2.39 m^2 BMI: 43.77 kg/m^2 BP: 160/84 mmHg  Conclusions   Summary  Ejection fraction is visually estimated at 55%.   Overall left ventricular function is normal.   Signature   ----------------------------------------------------------------  Electronically signed by Ana Norman MD (Interpreting  physician) on 09/07/2021 at 03:43 PM ----------------------------------------------------------------   Findings   Mitral Valve  The mitral valve structure was normal with normal leaflet separation. DOPPLER: The transmitral velocity was within the normal range with no  evidence for mitral stenosis. There was no evidence of mitral  regurgitation. Aortic Valve  The aortic valve was trileaflet with normal thickness and cuspal  separation. DOPPLER: Transaortic velocity was within the normal range with  no evidence of aortic stenosis. There was no evidence of aortic  regurgitation. Tricuspid Valve  The tricuspid valve structure was normal with normal leaflet separation. DOPPLER: There was no evidence of tricuspid stenosis. There was no  evidence of tricuspid regurgitation. Pulmonic Valve  The pulmonic valve was not well visualized . Trivial pulmonic regurgitation visualized. Left Atrium  Left atrial size was normal.   Left Ventricle  Ejection fraction is visually estimated at 55%. Overall left ventricular function is normal.   Right Atrium  Right atrial size was normal.   Right Ventricle  The right ventricular size was normal with normal systolic function and  wall thickness. Pericardial Effusion  The pericardium was normal in appearance with no evidence of a pericardial  effusion. Pleural Effusion  No evidence of pleural effusion. Aorta / Great Vessels  -Aortic root dimension within normal limits.  -The Pulmonary artery is within normal limits. -IVC size is within normal limits with normal respiratory phasic changes.   M-Mode/2D Measurements & Calculations   LV Diastolic     LV Systolic Dimension: 3.5 cm  AV Cusp Separation: 1.8  Dimension: 4.9   LV Volume Diastolic: 490 ml    cmAO Root Dimension: 3.3  cm               LV Volume Systolic: 28.1 ml    cm  LV FS:28.6 %     LV EDV/LV EDV Index: 113 QR/61  LV PW Diastolic: U^2NP ESV/LV ESV Index: 50.9  1.1 cm           ml/21 m^2  Septum           EF Calculated: 55 %            RV Diastolic Dimension:  Diastolic: 1.1                                  2.9 cm  cm  Doppler Measurements & Calculations   MV Peak E-Wave: 93 cm/s    AV Peak Velocity: 164  LVOT Peak Velocity: 96.8  MV Peak A-Wave: 89.1 cm/s  cm/s                   cm/s  MV E/A Ratio: 1.04         AV Peak Gradient:      LVOT Peak Gradient: 4  MV Peak Gradient: 3.46     10.76 mmHg             mmHg  mmHg                                                    TV Peak E-Wave: 50.6  MV Deceleration Time: 236                         cm/s  msec                                              TV Peak A-Wave: 52.7  MV P1/2t: 69 msec          IVRT: 127 msec         cm/s  MVA by PHT:3.19 cm^2                                                    TV Peak Gradient: 1.02  MV E' Septal Velocity: 5.7 AV DVI (Vmax):0.59     mmHg  cm/s  MV A' Septal Velocity: 8.9                        PV Peak Velocity: 78.8  cm/s                                              cm/s  MV E' Lateral Velocity:                           PV Peak Gradient: 2.48  6.7 cm/s                                          mmHg  MV A' Lateral Velocity:  7.5 cm/s  E/E' septal: 16.32  E/E' lateral: 13.88  [email or web address] [id number]    CT ABDOMEN PELVIS WO CONTRAST Additional Contrast? Radiologist Recommendation    Result Date: 9/6/2021  PROCEDURE: CT ABDOMEN PELVIS WO CONTRAST CLINICAL INFORMATION: Chest and abdominal pain starting this morning, intra-abdominal cause COMPARISON: 11/7/2020 TECHNIQUE:  Axial CT images were obtained through the abdomen and pelvis without contrast Coronal and sagittal reformatted images were rendered. All CT scans at this facility use dose modulation, iterative reconstruction, and/or weight-based dosing when appropriate to reduce radiation dose to as low as reasonably achievable. FINDINGS: Limited evaluation of the lung bases demonstrates mild dependent bibasilar atelectasis. The liver, gallbladder, spleen, pancreas and adrenal glands appear normal. The kidneys demonstrate no evidence of hydronephrosis or hydroureter. No nephrolithiasis or ureterolithiasis is seen. The urinary bladder appears normal. There is no evidence of bowel obstruction. There is no free air or free fluid. There are diffuse soft tissue calcifications involving the fascial planes of the visualized lower chest, abdomen and pelvis with more focal areas of larger coarse calcification demonstrated along the medial aspect of the right gluteus region as well as along the anterolateral left upper quadrant. These calcifications are nonspecific but may be related to dermatomyositis. Recommend clinical correlation. Marked degenerative changes are demonstrated involving the bilateral right greater than left hip joints. There is also bilateral SI joint arthrosis and marked lower lumbar facet arthrosis. 1. There are diffuse soft tissue calcifications involving the fascial planes of the visualized lower chest, abdomen and pelvis with more focal areas of larger coarse calcification demonstrated along the medial aspect of the right gluteus region as well as along the anterolateral left upper quadrant. These calcifications are nonspecific but may be related to dermatomyositis. Recommend clinical correlation. 2. No other acute intra-abdominal or pelvic findings are otherwise seen. **This report has been created using voice recognition software. It may contain minor errors which are inherent in voice recognition technology. ** Final report electronically signed by Dr. Boni Long on 9/6/2021 4:05 PM    XR CHEST PORTABLE    Result Date: 9/6/2021  PROCEDURE: XR CHEST PORTABLE CLINICAL INFORMATION: Chest Pain COMPARISON: 11/13/2020 TECHNIQUE:  AP mobile chest  FINDINGS: The heart size is within normal limits. Patchy airspace disease at the medial right lung base is demonstrated which may represent atelectasis or pneumonia.  No pleural effusion or pneumothorax is seen. No acute osseous findings are seen. 1. Mild patchy airspace disease at the medial right lung base may represent atelectasis or pneumonia. **This report has been created using voice recognition software. It may contain minor errors which are inherent in voice recognition technology. ** Final report electronically signed by Dr. Chantell Torres on 2021 2:11 PM       Follow-up scheduled after discharge :-    today with SANDRA Castro CNP  in the next few days     Consultations during this hospital stay:-  [] NONE [x] Cardiology  [] Nephrology  [] Hemo onco   [] GI   [] ID  [] Endocrine  [] Pulm    [] Neuro    [] Psych   [] Urology  [] ENT   [] G SURGERY   []Ortho    []CV surg    [] Palliative  [] Hospice [] Pain management   []    []TCU   [] PT/OT  OTHERS:-    Disposition: home  Condition at Discharge: Stable    Discharge Medications:      Patience Soda   Home Medication Instructions LE    Printed on:21 1331   Medication Information                      acetaminophen (TYLENOL) 325 MG tablet  Take 650 mg by mouth every 4 hours as needed for Pain             albuterol sulfate HFA (PROVENTIL HFA) 108 (90 Base) MCG/ACT inhaler  Inhale 2 puffs into the lungs every 6 hours as needed for Wheezing             allopurinol (ZYLOPRIM) 300 MG tablet  Take 1 tablet by mouth daily             apixaban (ELIQUIS) 5 MG TABS tablet  Take 10mg by mouth two times daily for 6 days followed by 5mg by mouth two times daily             ARIPiprazole (ABILIFY PO)  Take 15 mg by mouth daily              atorvastatin (LIPITOR) 40 MG tablet  Take 40 mg by mouth nightly             Blood Glucose Monitoring Suppl (FREESTYLE LITE) ARTIE  Patient needs all supplies for qd testing.  DX: 250.00             busPIRone (BUSPAR) 10 MG tablet  Take 10 mg by mouth 2 times daily              CHOLECALCIFEROL PO  Take 2,000 Units by mouth daily Citalopram Hydrobromide (CELEXA PO)  Take 30 mg by mouth daily From [facility] PSYCHIATRIC professional services              clotrimazole-betamethasone (LOTRISONE) 1-0.05 % cream  Apply topically 2 times daily. ferrous sulfate (IRON 325) 325 (65 Fe) MG tablet  Take 1 tablet by mouth 2 times daily (with meals)             folic acid (FOLVITE) 1 MG tablet  Take 1 mg by mouth daily             furosemide (LASIX) 40 MG tablet  Take 40 mg by mouth daily             gabapentin (NEURONTIN) 400 MG capsule  Take 1 capsule by mouth 2 times daily for 180 days. glycopyrrolate-formoterol (BEVESPI) 9-4.8 MCG/ACT AERO  Inhale 2 puffs into the lungs 2 times daily             hydrALAZINE (APRESOLINE) 50 MG tablet  Take 50 mg by mouth 2 times daily              HYDROcodone-acetaminophen (NORCO) 5-325 MG per tablet  Take 1 tablet by mouth every 6 hours as needed for Pain for up to 30 days. insulin glargine (LANTUS) 100 UNIT/ML injection vial  Inject 10 Units into the skin nightly              insulin lispro (HUMALOG) 100 UNIT/ML injection vial  Inject into the skin 3 times daily (before meals) Per scale: 151-200=1 unit, 201-250=2 units, 251-300-3 units, 301-350=4 units, 351-400=5 units.              Multiple Vitamins-Minerals (THERAPEUTIC MULTIVITAMIN-MINERALS) tablet  Take 1 tablet by mouth daily             ondansetron (ZOFRAN) 4 MG tablet  Take 4 mg by mouth every 8 hours as needed for Nausea or Vomiting             pantoprazole (PROTONIX) 40 MG tablet  Take 1 tablet by mouth daily             polyethylene glycol (GLYCOLAX) 17 g packet  Take 17 g by mouth daily as needed for Constipation             potassium chloride (KLOR-CON M) 20 MEQ extended release tablet  Take 1 tablet by mouth daily             Probiotic Product (SOBIA-BID PROBIOTIC PO)  Take 1 tablet by mouth daily              senna (SENOKOT) 8.6 MG tablet  Take 1 tablet by mouth 2 times daily             sitaGLIPtan-metformin (JANUMET)  MG per tablet  Take 1 tablet by mouth 2 times daily (with meals)              tamsulosin (FLOMAX) 0.4 MG capsule  Take 0.4 mg by mouth nightly              vitamin C (ASCORBIC ACID) 500 MG tablet  Take 2 tablets by mouth daily                      Time Spent:- 45 minutes    Electronically signed by David Ulloa MD on 9/9/2021 at 1:31 PM  Discharging Hospitalist

## 2021-09-26 NOTE — PROGRESS NOTES
[facility] [address] [facility] Follow up note Brian Sims is an established patient of my sleep medicine clinic at [facility]. He came for Sleep medicine reevaluation today. He was seen in my sleep clinic by Ms. Lisandro Diaz CNP for the last time on 19 June 2020            Bry Conley                                             Chief complaint and [place]: Bry Conley is a 44 y. o.oldmale came for follow up regarding his sleep apnea. He was prescribed with the BiPAP with IPAP of 24 cm of water and EPAP of 20 cm of water on 20 February 2020. He used to be on BiPAP treatment. He subsequently got admitted to [address] [place] [address]. Patient lost his BiPAP device during his stay at [address] [place] [address]. He used to follow with Ms. Lisandro Diaz CNP at the [facility] office in the past.  He want to go back on his BiPAP device. He needs a new equipment. His old BiPAP machine was taken back by his [employer] company. He used to follow with Ms. Lisandro Diaz CNP clinic in the past. His care was switched to my service after departure of Ms. Lisandro Diaz CNP from [facility] office. Review of Systems:   General/Constitutional: he lost 2 lbs of weight from the last visit with normal appetite. No fever or chills. HENT: Negative. Eyes: Negative. Upper respiratory tract: No nasal stuffiness or post nasal drip. Lower respiratory tract/ lungs: No cough or sputum production. No hemoptysis. Cardiovascular: No palpitations or chest pain. Gastrointestinal: No nausea or vomiting. Neurological: No focal neurologiacal weakness. Extremities: No edema. He is moving around with the help of a cane. Musculoskeletal: No complaints. Genitourinary: No complaints. Hematological: Negative. Psychiatric/Behavioral: Negative. Skin: No itching.         Past Medical History:   Diagnosis Date    Aortic stenosis, mild to moderate     BPH (benign prostatic hyperplasia)     Carpal tunnel syndrome on right     rt hand    Chronic gout     DM2 (diabetes mellitus, type 2) (HCC)     Dyslipidemia     GERD (gastroesophageal reflux disease)     Heart failure with preserved ejection fraction (HCC)     History of alcohol abuse     History of atrial fibrillation     History of osteomyelitis     Hx of R foot wound, osteomyelitis July 2018 requiring surgery and IV Vanco    Hypertension, essential     Hypogonadism, male     Major depression     [facility]     Morbid obesity ([facility]     DURAN (nonalcoholic steatohepatitis)     KIARA treated with BiPAP     Vitamin D deficiency        Past Surgical History:   Procedure Laterality Date    COLONOSCOPY N/A 11/15/2020    COLONOSCOPY DIAGNOSTIC performed by Candie Ivey MD at [facility] Right     2018, R foot osteomyelitis    HIP SURGERY Left 6/29/2020    bilateral hip steroid injection, Left HIP FIRST performed by Jim Parisi MD at [address] [facility] N/A 10/26/2020    [address] [facility] performed by Bri Villafana MD at [facility]      as a baby    UPPER GASTROINTESTINAL ENDOSCOPY N/A 11/14/2020    EGD DIAGNOSTIC ONLY performed by Candie Ivey MD at [facility]       Social History     Tobacco Use    Smoking status: Never Smoker    Smokeless tobacco: Never Used   Vaping Use    Vaping Use: Never used   Substance Use Topics    Alcohol use: Not Currently     Comment: hx of abuse    Drug use: No       Allergies   Allergen Reactions    Penicillins      Tolerated Zosyn 9/24/2020       Current Outpatient Medications   Medication Sig Dispense Refill    pregabalin (LYRICA) 50 MG capsule Take 50 mg by mouth 3 times daily.  clotrimazole-betamethasone (LOTRISONE) 1-0.05 % cream Apply topically 2 times daily.  1 each 0    HYDROcodone-acetaminophen (NORCO) 5-325 MG per tablet Take 1 tablet by mouth every 6 hours as needed (CELEXA PO) Take 30 mg by mouth daily From [facility] PSYCHIATRIC professional services       Blood Glucose Monitoring Suppl (FREESTYLE LITE) ARTIE Patient needs all supplies for qd testing. DX: 250.00 1 Device 11    ondansetron (ZOFRAN) 4 MG tablet Take 4 mg by mouth every 8 hours as needed for Nausea or Vomiting       No current facility-administered medications for this visit. Family History   Problem Relation Age of Onset    Heart Disease Mother         MI    Cancer Paternal Aunt         lung          /72 (Site: Left Lower Arm, Position: Sitting, Cuff Size: Medium Adult)   Pulse 75   Temp 97.6 °F (36.4 °C)   Ht 5' 8\" (1.727 m)   Wt 290 lb (131.5 kg)   SpO2 98% Comment: room air at rest  BMI 44.09 kg/m²     BMI:  Body mass index is 44.09 kg/m². Mallampati airway Class: 4  Neck Circumference:.20.5 Inches  Aldrich sleepiness score 10/20/21: 7  Sleep apnea quality of life questionnaire:46    Physical Exam :  Constitutional: Patient appears well built and well nourished. No distress. Patient is oriented to person, place, and time. HENT:   Head: Normocephalic and atraumatic. Right Ear: External ear normal.   Left Ear: External ear normal.   Mouth/Throat: Oropharynx is clear and moist.   Eyes: Conjunctivae are normal. Pupils are equal and reactive to light. No scleral icterus. Neck: Neck supple. No JVD present. Cardiovascular: Normal rate, regular rhythm, normal heart sounds. No murmur heard. Pulmonary/Chest: Effort normal and breath sounds normal. No stridor. No respiratory distress. No wheezes. No rales. Abdominal: Soft. Patient exhibits no distension. No tenderness. Musculoskeletal: Normal range of motion. Extremities:Patient exhibits no edema and no tenderness. Lymphadenopathy:  No cervical adenopathy. Neurological: Patient is alert and oriented to person, place, and time. Skin: Skin is warm and dry. Patient is not diaphoretic.    Psychiatric: Patient  has a normal mood and affect. Diagnostic Data:   [address] [place] [address]                                SLEEP STUDY REPORT     PATIENT NAME: Johan Jacobs                :        1970  MED REC NO:   [id number]                           ROOM:  ACCOUNT NO:   [de-identified]                           ADMIT DATE: 2020  PROVIDER:     Paula Love. MD Naima     DATE OF STUDY:  2020     SPLIT-NIGHT SLEEP STUDY REPORT     REFERRING PROVIDER:  Saskia Bedolla CNP     The patient's height is 68 inches, weight is 292 pounds with a BMI of  34.4. HISTORY:  The patient is a 49-year-old gentleman, was initially  evaluated by me on 2020. The patient currently suffering with  hypersomnia. The patient also gave a history of snoring with witnessed  apneas. The patient had associated comorbidities including  hypertension, depression, and type 2 diabetes mellitus. The patient  scheduled for split-night sleep study to diagnose and treat sleep apnea. METHODS:  The patient underwent digital polysomnography in compliance  with the standards and specifications from the AASM Manual including the  simultaneous recording of 3 EEG channels (F4-M1, C4-M1, and O2-M1 with  back up electrodes F3-M2, C3-M2, and O1-M2), 2 EOG channels (E1-M2, and  E2-M1,), EMG (chin, left & right leg), EKG, Nonin pulse oximetry with   less than 2 second averaging time, body position, airflow recorded by  oral-nasal thermal sensor and nasal air pressure transducer, plus  respiratory effort recorded by calibrated respiratory inductance  plethysmography (RIP), flow volume loop, sound and video. Sleep staging  and scoring followed the standard put forth by the American Academy of  Sleep Medicine and utilized the 4A obstructive hypopnea event  desaturation of 4 percent or greater.      INTERPRETATION:  This is a split-night sleep study which is going to be  dictated as part A and part B. PART A:  The diagnostic portion of split-night sleep study was performed  on 02/26/2020. The study was started at 09:31 p.m. and was terminated  at 11:37 p.m. The total recording time was 126.3 minutes and sleep  period time was 118.5 minutes, and total sleep time was 116 minutes. Overall sleep efficiency was 91.9%. The sleep onset latency was 7.7  minutes and wake after sleep onset was 2.5 minutes. REM sleep latency  was not available due to absent REM sleep. RESPIRATORY EVENT ANALYSIS:  Revealed the patient had a total of 180  apneas during diagnostic portion, all of them were obstructive in  nature. The patient also had a total of 27 hypopneas, all of them were  obstructive in nature. The total number of apneas and hypopneas  recorded during the study were 207 with the apnea-hypopnea index of  107. 1. The patient's REM sleep apnea-hypopnea index was not available  due to absent REM sleep. POSITIONAL ANALYSIS:  Revealed the patient spent 116 minutes in supine  position with a supine apnea-hypopnea index was 107. 1. PERIODIC LIMB MOVEMENT ANALYSIS:  Revealed the patient did not have any  periodic limb movements. The patient had a total of 13 spontaneous  arousals with a spontaneous arousal index of 6.7. OXYGEN SATURATION MONITORING:  Revealed the patient had a maximum oxygen  desaturation to 71% with a mean oxygen saturation of 88.4%. The patient  spent total of 49.3 minutes below oxygen saturation less than 88%. EKG MONITORING:  Revealed normal sinus rhythm. PART B:  The treatment part of split-night sleep study was performed on  02/26/2020. The study was started at 11:47 p.m. and was terminated at  05:05 a.m. with a total recording time was 318.2 minutes, sleep period  time was 316.9 minutes, and overall sleep efficiency was 85.3%. The  total sleep time was 271.4 minutes.   The sleep onset latency was 1.3  minutes and wake after sleep onset was 45.5 minutes and REM sleep  latency was 9 minutes. SLEEP STAGING AND DISTRIBUTION SUMMARY:  Revealed the patient spent 23.5  minutes in stage I consisting of 8.7%, 109.9 minutes in stage II  consisting of 40.5%, 47 minutes in the stage III consisting of 17.3%,  and 91 minutes in REM sleep consisting of 33.5% of total sleep time. CPAP TITRATION STUDY:  The CPAP titration study was started with a CPAP  pressure of 6 cm of water, and the CPAP pressure was gradually increased  to a CPAP pressure of 15 cm of water by titrating to apneas and  hypopneas. Due to persistence of respiratory events and difficulty with  exhalation and failed CPAP therapy, the CPAP mode was changed into  bilevel pressures with a starting BiPAP pressure of 19/15 cm of water. The BiPAP pressure was further increased to a final BiPAP pressure of  24/20 cm of water. At a final BiPAP pressure of 24/20 cm of water on  room air, the patient spent 35.5 minutes in bed. Out of 35.5 minutes,  the patient slept for a period of 35.5 minutes in non-REM sleep. The  patient did not achieve REM sleep at this pressure. However, the  patient achieved REM sleep at BiPAP pressure of 22/18 cm of water. At a  BiPAP pressure of 24/20 cm of water, the patient had a total of 4  obstructive hypopneas with apnea-hypopnea index of 6.7. No apneas were  recorded to this pressure. The patient had a maximum oxygen  desaturation to 87%; however, towards the end of the study, the  patient's oxygen saturation majority of the time was between 89% to 92%  to 94% with a mean oxygen saturation of 91.4%. PERIODIC LIMB MOVEMENT ANALYSIS:  Revealed the patient did not have any  periodic limb movements. The patient had a total of 5 spontaneous  arousals with a spontaneous arousal index of 1.1. EKG MONITORING:  Revealed normal sinus rhythm. IMPRESSION:  1. Severe obstructive sleep apnea. The patient had acceptable  titration to a BiPAP pressure of 24/20 cm of water.   2.  Absent REM sleep during diagnostic portion; however, the patient  achieved good amount of REM sleep during treatment part consistent with  REM rebound phenomena. 3.  Hypersomnia, most likely due to sleep apnea. 4.  Hypertension. 5.  Depression. 6.  Type 2 diabetes mellitus. RECOMMENDATIONS:  1. For the patient's sleep-disordered breathing, we recommend starting  therapy with BiPAP a pressure of 24/20 cm of water with room air. 2.  Specific recommendations include the patient's choice of interface  was F20 large size mask. 3.  The patient should be scheduled for a followup in my clinic in six  to eight weeks. I recommended BiPAP pressures for clinical  re-evaluation with review of download. Thanks to Ms. Sulma Flores for giving me this opportunity to  participate in the care of this pleasant gentleman. Maurilio Reese MD     D: 02/28/2020 15:00:19       T: 02/29/2020 3:45:35     [email or web address]  Job#: [id number]     Doc#: [id number]    Assesment:  -Severe obstructive sleep apnea diagnosed by a split-night sleep study performed on 26 February 2020. He was prescribed with the BiPAP with IPAP of 24 cm of water and EPAP of 20 cm of water on 20 February 2020. He used to be on BiPAP treatment. He subsequently got admitted to [address] [place] [address]. Patient lost his BiPAP device during his stay at [address] [place] [address]. He used to follow with Ms. Hortencia Barker CNP at the [facility] in the past.  He want to go back on his BiPAP device. He needs a new equipment.     -Hypersomnia ( Excessive daytime sleepiness) may be due to uncontrolled obstructive sleep apnea Vs his current sedative meds. -Hypertension under control.  -Depression on meds. -Type II or unspecified type diabetes mellitus without mention of complication, not stated as uncontrolled. - DURAN (nonalcoholic steatohepatitis).   -Sarcoidosis- he follows with Dr. Jorge Armendariz MD.      Recommendations/Plan:  -Schedule patient for BiPAP retitration at [facility] sleep lab as soon as possible to issue a new BiPAP device. . Patient to follow with my clinic at [facility] sleep clinic in 6 to 8 weeks with BiPAP download for further evaluation. He lost his BiPAP device during his stay at [facility].  -He will be issued a new BiPAP device after above titration.  -He was advised to keep good compliance with current recommended pressure to get optimal results and clinical improvement.  -Follow with my clinic in 6 to 8 weeks once he was set up on a new BiPAP device for clinical reevaluation with review of download. -He was advised to call [employer] regarding supplies if needed. -He was advised to continue to practice good sleep hygiene practices.  -He was advised to loose weight by controlling diet and doing exercise.  -He was instructed to not to drive any motor vehicles or operate heavy equipment if he feels sleepy. -He was advised to call my office for earlier appointment if needed for worsening of sleep symptoms.  -Mari Curran and his wife were educated about my impression and plan. They verbalizes understanding.      -I personally reviewed updated the Past medical hx, Past surgical hx,Social hx, Family hx, Medications, Allergies in the discrete data section of the patient chart along with labs, Pulmonary medicine,Sleep medicine related, Pathological, Microbiological and Radiological investigations.

## 2021-09-28 ENCOUNTER — OFFICE VISIT (OUTPATIENT)
Dept: NEPHROLOGY | Age: 51
End: 2021-09-28
Payer: MEDICARE

## 2021-09-28 VITALS
BODY MASS INDEX: 42.88 KG/M2 | SYSTOLIC BLOOD PRESSURE: 136 MMHG | TEMPERATURE: 97.5 F | DIASTOLIC BLOOD PRESSURE: 91 MMHG | HEART RATE: 73 BPM | WEIGHT: 282 LBS | OXYGEN SATURATION: 96 %

## 2021-09-28 DIAGNOSIS — I10 ESSENTIAL HYPERTENSION: ICD-10-CM

## 2021-09-28 DIAGNOSIS — N06.9 ISOLATED PROTEINURIA WITH MORPHOLOGIC LESION: ICD-10-CM

## 2021-09-28 DIAGNOSIS — N18.1 CKD (CHRONIC KIDNEY DISEASE), STAGE I: Primary | ICD-10-CM

## 2021-09-28 LAB
GFR SERPL CREATININE-BSD FRML MDRD: 70 ML/MIN/1.73M2
GFR SERPL CREATININE-BSD FRML MDRD: 78 ML/MIN/1.73M2

## 2021-09-28 PROCEDURE — 1036F TOBACCO NON-USER: CPT | Performed by: INTERNAL MEDICINE

## 2021-09-28 PROCEDURE — 99213 OFFICE O/P EST LOW 20 MIN: CPT | Performed by: INTERNAL MEDICINE

## 2021-09-28 PROCEDURE — 3017F COLORECTAL CA SCREEN DOC REV: CPT | Performed by: INTERNAL MEDICINE

## 2021-09-28 PROCEDURE — G8417 CALC BMI ABV UP PARAM F/U: HCPCS | Performed by: INTERNAL MEDICINE

## 2021-09-28 PROCEDURE — G8428 CUR MEDS NOT DOCUMENT: HCPCS | Performed by: INTERNAL MEDICINE

## 2021-09-28 NOTE — PROGRESS NOTES
performed by Willis Koenig MD at [facility]        Medications :     Outpatient Medications Marked as Taking for the 9/28/21 encounter (Office Visit) with Hosea Alcala MD   Medication Sig Dispense Refill    clotrimazole-betamethasone (LOTRISONE) 1-0.05 % cream Apply topically 2 times daily.  1 each 0    polyethylene glycol (GLYCOLAX) 17 g packet Take 17 g by mouth daily as needed for Constipation 527 g 0    potassium chloride (KLOR-CON M) 20 MEQ extended release tablet Take 1 tablet by mouth daily 30 tablet 2    apixaban (ELIQUIS) 5 MG TABS tablet Take 10mg by mouth two times daily for 6 days followed by 5mg by mouth two times daily 60 tablet 1    glycopyrrolate-formoterol (BEVESPI) 9-4.8 MCG/ACT AERO Inhale 2 puffs into the lungs 2 times daily 10.7 g 1    ferrous sulfate (IRON 325) 325 (65 Fe) MG tablet Take 1 tablet by mouth 2 times daily (with meals) 30 tablet 3    albuterol sulfate HFA (PROVENTIL HFA) 108 (90 Base) MCG/ACT inhaler Inhale 2 puffs into the lungs every 6 hours as needed for Wheezing 1 Inhaler 3    vitamin C (ASCORBIC ACID) 500 MG tablet Take 2 tablets by mouth daily 30 tablet 0    CHOLECALCIFEROL PO Take 2,000 Units by mouth daily      atorvastatin (LIPITOR) 40 MG tablet Take 40 mg by mouth nightly      acetaminophen (TYLENOL) 325 MG tablet Take 650 mg by mouth every 4 hours as needed for Pain      busPIRone (BUSPAR) 10 MG tablet Take 10 mg by mouth 2 times daily       pantoprazole (PROTONIX) 40 MG tablet Take 1 tablet by mouth daily 5 tablet 0    allopurinol (ZYLOPRIM) 300 MG tablet Take 1 tablet by mouth daily 90 tablet 0    hydrALAZINE (APRESOLINE) 50 MG tablet Take 50 mg by mouth 2 times daily       Multiple Vitamins-Minerals (THERAPEUTIC MULTIVITAMIN-MINERALS) tablet Take 1 tablet by mouth daily      Probiotic Product (SOBIA-BID PROBIOTIC PO) Take 1 tablet by mouth daily       ondansetron (ZOFRAN) 4 MG tablet Take 4 mg by mouth every 8 hours as needed for Nausea or Vomiting      tamsulosin (FLOMAX) 0.4 MG capsule Take 0.4 mg by mouth nightly       folic acid (FOLVITE) 1 MG tablet Take 1 mg by mouth daily      furosemide (LASIX) 40 MG tablet Take 40 mg by mouth daily       insulin lispro (HUMALOG) 100 UNIT/ML injection vial Inject into the skin 3 times daily (before meals) Per scale: 151-200=1 unit, 201-250=2 units, 251-300-3 units, 301-350=4 units, 351-400=5 units.  sitaGLIPtan-metformin (JANUMET)  MG per tablet Take 1 tablet by mouth 2 times daily (with meals)       insulin glargine (LANTUS) 100 UNIT/ML injection vial Inject 10 Units into the skin nightly       senna (SENOKOT) 8.6 MG tablet Take 1 tablet by mouth 2 times daily      ARIPiprazole (ABILIFY PO) Take 15 mg by mouth daily       Citalopram Hydrobromide (CELEXA PO) Take 30 mg by mouth daily From [facility] professional services       Blood Glucose Monitoring Suppl (FREESTYLE LITE) ARTIE Patient needs all supplies for qd testing. DX: 250.00 1 Device 11       Vitals     BP (!) 136/91 (Site: Left Upper Arm, Position: Sitting, Cuff Size: Medium Adult)   Pulse 73   Temp 97.5 °F (36.4 °C) (Temporal)   Wt 282 lb (127.9 kg)   SpO2 96%   BMI 42.88 kg/m²  Wt Readings from Last 3 Encounters:   09/28/21 282 lb (127.9 kg)   09/09/21 284 lb 6.4 oz (129 kg)   09/02/21 284 lb (128.8 kg)        Physical Exam     General -- no distress, obese  Lungs -- clear  Heart -- S1, S2 heard, JVD - no  Abdomen - soft, non-tender  Extremities -- trace edema  CNS - awake and alert    Labs, Radiology and Tests    Labs -    12/20 9/21          Potassium 4.3 4.3          BUN 7 16          Creatinine 0.8 1.0          eGFR 102 78                      UPCR            UMCR                          Renal Ultrasound Scan -- 10/20  Right kidney 11.8 cm left kidney 10.6 cm     Assessment    1.  Renal -renal function appears to be stable at baseline  -May be having chronic kidney disease stage I due to presence of protein in the

## 2021-10-15 ENCOUNTER — TELEPHONE (OUTPATIENT)
Dept: CARDIOLOGY CLINIC | Age: 51
End: 2021-10-15

## 2021-10-15 NOTE — LETTER
[facility]  [address] [address]  Phone: [phone]  Fax: [phone]      October 15, 2021     Shazia Ortiz  [facility] [id number]      Dear Duane Flowers:    I am writing you because I have been informed of your missed appointment(s). We care about you and the management of your healthcare and want to make sure that you follow up as recommended. We're sorry you were unable to keep your appointment and hope that you are doing well. We would like to continue treating your healthcare needs. Please call the office to let us know your plans for treatment and to reschedule your appointment.      Sincerely,        Chaz Kimball MD

## 2021-10-20 ENCOUNTER — OFFICE VISIT (OUTPATIENT)
Dept: PULMONOLOGY | Age: 51
End: 2021-10-20
Payer: MEDICARE

## 2021-10-20 VITALS
TEMPERATURE: 97.6 F | HEART RATE: 75 BPM | BODY MASS INDEX: 43.95 KG/M2 | SYSTOLIC BLOOD PRESSURE: 132 MMHG | HEIGHT: 68 IN | OXYGEN SATURATION: 98 % | WEIGHT: 290 LBS | DIASTOLIC BLOOD PRESSURE: 72 MMHG

## 2021-10-20 DIAGNOSIS — G47.33 OSA TREATED WITH BIPAP: Primary | ICD-10-CM

## 2021-10-20 DIAGNOSIS — F32.A DEPRESSION, UNSPECIFIED DEPRESSION TYPE: ICD-10-CM

## 2021-10-20 DIAGNOSIS — I10 ESSENTIAL HYPERTENSION: ICD-10-CM

## 2021-10-20 DIAGNOSIS — G47.10 HYPERSOMNIA: ICD-10-CM

## 2021-10-20 PROCEDURE — G8427 DOCREV CUR MEDS BY ELIG CLIN: HCPCS | Performed by: INTERNAL MEDICINE

## 2021-10-20 PROCEDURE — G8417 CALC BMI ABV UP PARAM F/U: HCPCS | Performed by: INTERNAL MEDICINE

## 2021-10-20 PROCEDURE — G8484 FLU IMMUNIZE NO ADMIN: HCPCS | Performed by: INTERNAL MEDICINE

## 2021-10-20 PROCEDURE — 99214 OFFICE O/P EST MOD 30 MIN: CPT | Performed by: INTERNAL MEDICINE

## 2021-10-20 PROCEDURE — 1036F TOBACCO NON-USER: CPT | Performed by: INTERNAL MEDICINE

## 2021-10-20 PROCEDURE — 3017F COLORECTAL CA SCREEN DOC REV: CPT | Performed by: INTERNAL MEDICINE

## 2021-10-20 RX ORDER — PREGABALIN 50 MG/1
50 CAPSULE ORAL 3 TIMES DAILY
COMMUNITY
End: 2022-05-26

## 2021-10-20 NOTE — PATIENT INSTRUCTIONS
Recommendations/Plan:  -Schedule patient for BiPAP retitration at [facility] sleep lab as soon as possible to issue a new BiPAP device. . Patient to follow with my clinic at [facility] sleep clinic in 6 to 8 weeks with BiPAP download for further evaluation. He lost his BiPAP device during his stay at [facility].  -He will be issued a new BiPAP device after above titration.  -He was advised to keep good compliance with current recommended pressure to get optimal results and clinical improvement.  -Follow with my clinic in 6 to 8 weeks once he was set up on a new BiPAP device for clinical reevaluation with review of download. -He was advised to call [employer] regarding supplies if needed. -He was advised to continue to practice good sleep hygiene practices.  -He was advised to loose weight by controlling diet and doing exercise.  -He was instructed to not to drive any motor vehicles or operate heavy equipment if he feels sleepy. -He was advised to call my office for earlier appointment if needed for worsening of sleep symptoms.  -Pierre Goodeno and his wife were educated about my impression and plan. They verbalizes understanding.

## 2021-10-20 NOTE — PROGRESS NOTES
Chief Complaint: Follow-up requested by Mariela Rivera 2/26/20. Mallampati airway Class: 4  Neck Circumference:.20.5 Inches    [place] sleepiness score 10/20/21:   Sleep apnea quality of life questionnaire:.

## 2021-10-22 ENCOUNTER — HOSPITAL ENCOUNTER (EMERGENCY)
Age: 51
Discharge: HOME | End: 2021-10-22
Attending: EMERGENCY MEDICINE
Payer: MEDICARE

## 2021-10-22 ENCOUNTER — APPOINTMENT (OUTPATIENT)
Dept: CT IMAGING | Age: 51
End: 2021-10-22
Payer: MEDICARE

## 2021-10-22 VITALS
SYSTOLIC BLOOD PRESSURE: 114 MMHG | WEIGHT: 298 LBS | TEMPERATURE: 100.1 F | HEART RATE: 76 BPM | DIASTOLIC BLOOD PRESSURE: 66 MMHG | BODY MASS INDEX: 45.31 KG/M2 | RESPIRATION RATE: 18 BRPM | OXYGEN SATURATION: 94 %

## 2021-10-22 DIAGNOSIS — E86.0 DEHYDRATION: ICD-10-CM

## 2021-10-22 DIAGNOSIS — R77.8 ELEVATED TROPONIN: ICD-10-CM

## 2021-10-22 DIAGNOSIS — R10.13 EPIGASTRIC PAIN: ICD-10-CM

## 2021-10-22 DIAGNOSIS — K08.89 PAIN, DENTAL: Primary | ICD-10-CM

## 2021-10-22 DIAGNOSIS — R93.89 ABNORMAL CT SCAN: ICD-10-CM

## 2021-10-22 LAB
ALBUMIN SERPL-MCNC: 4.1 G/DL (ref 3.5–5.1)
ALP BLD-CCNC: 197 U/L (ref 38–126)
ALT SERPL-CCNC: 31 U/L (ref 11–66)
ANION GAP SERPL CALCULATED.3IONS-SCNC: 12 MEQ/L (ref 8–16)
AST SERPL-CCNC: 22 U/L (ref 5–40)
BACTERIA: ABNORMAL /HPF
BASOPHILS # BLD: 0.3 %
BASOPHILS ABSOLUTE: 0 THOU/MM3 (ref 0–0.1)
BILIRUB SERPL-MCNC: 0.8 MG/DL (ref 0.3–1.2)
BILIRUBIN URINE: NEGATIVE
BLOOD, URINE: ABNORMAL
BUN BLDV-MCNC: 20 MG/DL (ref 7–22)
CALCIUM SERPL-MCNC: 9.4 MG/DL (ref 8.5–10.5)
CASTS 2: ABNORMAL /LPF
CASTS UA: ABNORMAL /LPF
CHARACTER, URINE: CLEAR
CHLORIDE BLD-SCNC: 100 MEQ/L (ref 98–111)
CO2: 25 MEQ/L (ref 23–33)
COLOR: YELLOW
CREAT SERPL-MCNC: 1.2 MG/DL (ref 0.4–1.2)
CRYSTALS, UA: ABNORMAL
EOSINOPHIL # BLD: 0.4 %
EOSINOPHILS ABSOLUTE: 0 THOU/MM3 (ref 0–0.4)
EPITHELIAL CELLS, UA: ABNORMAL /HPF
ERYTHROCYTE [DISTWIDTH] IN BLOOD BY AUTOMATED COUNT: 14.1 % (ref 11.5–14.5)
ERYTHROCYTE [DISTWIDTH] IN BLOOD BY AUTOMATED COUNT: 46.2 FL (ref 35–45)
GLUCOSE BLD-MCNC: 222 MG/DL (ref 70–108)
GLUCOSE URINE: 500 MG/DL
HCT VFR BLD CALC: 47 % (ref 42–52)
HEMOGLOBIN: 15.8 GM/DL (ref 14–18)
IMMATURE GRANS (ABS): 0.05 THOU/MM3 (ref 0–0.07)
IMMATURE GRANULOCYTES: 0.4 %
KETONES, URINE: NEGATIVE
LACTIC ACID, SEPSIS: 1.4 MMOL/L (ref 0.5–1.9)
LACTIC ACID: 2.2 MMOL/L (ref 0.5–2)
LEUKOCYTE ESTERASE, URINE: NEGATIVE
LIPASE: 36.1 U/L (ref 5.6–51.3)
LYMPHOCYTES # BLD: 6.8 %
LYMPHOCYTES ABSOLUTE: 0.8 THOU/MM3 (ref 1–4.8)
MCH RBC QN AUTO: 30.2 PG (ref 26–33)
MCHC RBC AUTO-ENTMCNC: 33.6 GM/DL (ref 32.2–35.5)
MCV RBC AUTO: 89.9 FL (ref 80–94)
MISCELLANEOUS 2: ABNORMAL
MONOCYTES # BLD: 5.9 %
MONOCYTES ABSOLUTE: 0.7 THOU/MM3 (ref 0.4–1.3)
NITRITE, URINE: NEGATIVE
NUCLEATED RED BLOOD CELLS: 0 /100 WBC
PH UA: 5 (ref 5–9)
PLATELET # BLD: 203 THOU/MM3 (ref 130–400)
PMV BLD AUTO: 10.7 FL (ref 9.4–12.4)
POTASSIUM REFLEX MAGNESIUM: 3.8 MEQ/L (ref 3.5–5.2)
PROTEIN UA: 30
RBC # BLD: 5.23 MILL/MM3 (ref 4.7–6.1)
RBC URINE: ABNORMAL /HPF
RENAL EPITHELIAL, UA: ABNORMAL
SEG NEUTROPHILS: 86.2 %
SEGMENTED NEUTROPHILS ABSOLUTE COUNT: 10 THOU/MM3 (ref 1.8–7.7)
SODIUM BLD-SCNC: 137 MEQ/L (ref 135–145)
SPECIFIC GRAVITY, URINE: 1.01 (ref 1–1.03)
TOTAL PROTEIN: 7.3 G/DL (ref 6.1–8)
TROPONIN T: 0.02 NG/ML
TROPONIN T: 0.03 NG/ML
UROBILINOGEN, URINE: 0.2 EU/DL (ref 0–1)
WBC # BLD: 11.6 THOU/MM3 (ref 4.8–10.8)
WBC UA: ABNORMAL /HPF
YEAST: ABNORMAL

## 2021-10-22 PROCEDURE — 81001 URINALYSIS AUTO W/SCOPE: CPT

## 2021-10-22 PROCEDURE — 84484 ASSAY OF TROPONIN QUANT: CPT

## 2021-10-22 PROCEDURE — 74177 CT ABD & PELVIS W/CONTRAST: CPT

## 2021-10-22 PROCEDURE — 83605 ASSAY OF LACTIC ACID: CPT

## 2021-10-22 PROCEDURE — 83690 ASSAY OF LIPASE: CPT

## 2021-10-22 PROCEDURE — 80053 COMPREHEN METABOLIC PANEL: CPT

## 2021-10-22 PROCEDURE — 36415 COLL VENOUS BLD VENIPUNCTURE: CPT

## 2021-10-22 PROCEDURE — 6370000000 HC RX 637 (ALT 250 FOR IP): Performed by: EMERGENCY MEDICINE

## 2021-10-22 PROCEDURE — 93005 ELECTROCARDIOGRAM TRACING: CPT | Performed by: EMERGENCY MEDICINE

## 2021-10-22 PROCEDURE — 85025 COMPLETE CBC W/AUTO DIFF WBC: CPT

## 2021-10-22 PROCEDURE — 2580000003 HC RX 258: Performed by: EMERGENCY MEDICINE

## 2021-10-22 PROCEDURE — 87040 BLOOD CULTURE FOR BACTERIA: CPT

## 2021-10-22 PROCEDURE — 6360000004 HC RX CONTRAST MEDICATION: Performed by: EMERGENCY MEDICINE

## 2021-10-22 PROCEDURE — 99285 EMERGENCY DEPT VISIT HI MDM: CPT

## 2021-10-22 RX ORDER — HYDROCODONE BITARTRATE AND ACETAMINOPHEN 5; 325 MG/1; MG/1
1 TABLET ORAL ONCE
Status: COMPLETED | OUTPATIENT
Start: 2021-10-22 | End: 2021-10-22

## 2021-10-22 RX ORDER — METOPROLOL TARTRATE 5 MG/5ML
5 INJECTION INTRAVENOUS ONCE
Status: DISCONTINUED | OUTPATIENT
Start: 2021-10-22 | End: 2021-10-23 | Stop reason: HOSPADM

## 2021-10-22 RX ORDER — 0.9 % SODIUM CHLORIDE 0.9 %
1000 INTRAVENOUS SOLUTION INTRAVENOUS ONCE
Status: COMPLETED | OUTPATIENT
Start: 2021-10-22 | End: 2021-10-22

## 2021-10-22 RX ADMIN — SODIUM CHLORIDE 1000 ML: 9 INJECTION, SOLUTION INTRAVENOUS at 20:22

## 2021-10-22 RX ADMIN — HYDROCODONE BITARTRATE AND ACETAMINOPHEN 1 TABLET: 5; 325 TABLET ORAL at 21:32

## 2021-10-22 RX ADMIN — HYDROCODONE BITARTRATE AND ACETAMINOPHEN 1 TABLET: 5; 325 TABLET ORAL at 19:24

## 2021-10-22 RX ADMIN — IOPAMIDOL 80 ML: 755 INJECTION, SOLUTION INTRAVENOUS at 20:42

## 2021-10-22 ASSESSMENT — PAIN DESCRIPTION - ORIENTATION: ORIENTATION: RIGHT;LOWER

## 2021-10-22 ASSESSMENT — PAIN DESCRIPTION - PAIN TYPE
TYPE: ACUTE PAIN

## 2021-10-22 ASSESSMENT — PAIN DESCRIPTION - LOCATION: LOCATION: TEETH

## 2021-10-22 ASSESSMENT — ENCOUNTER SYMPTOMS
ABDOMINAL PAIN: 1
BACK PAIN: 0
VOMITING: 0
COUGH: 0
SHORTNESS OF BREATH: 0

## 2021-10-22 ASSESSMENT — PAIN DESCRIPTION - FREQUENCY: FREQUENCY: CONTINUOUS

## 2021-10-22 ASSESSMENT — PAIN SCALES - GENERAL
PAINLEVEL_OUTOF10: 8
PAINLEVEL_OUTOF10: 9
PAINLEVEL_OUTOF10: 8

## 2021-10-22 ASSESSMENT — PAIN DESCRIPTION - PROGRESSION: CLINICAL_PROGRESSION: GRADUALLY WORSENING

## 2021-10-22 ASSESSMENT — PAIN DESCRIPTION - ONSET: ONSET: ON-GOING

## 2021-10-22 ASSESSMENT — PAIN DESCRIPTION - DESCRIPTORS: DESCRIPTORS: SHARP

## 2021-10-22 NOTE — ED PROVIDER NOTES
[facility] [address] [address] EMERGENCY DEPT    EMERGENCY MEDICINE     Pt Name: Shyla Kenyon  MRN: [id number]  [place] 1970  Date of evaluation: 10/22/2021  Provider: Graceann Opitz, MD    CHIEF COMPLAINT       Chief Complaint   Patient presents with    Dental Pain       HISTORY OF PRESENT ILLNESS    Shyla Kenyon is a pleasant 44 y.o. male who presents to the emergency department from home with multiple complaints. He reports he has been feeling hot, also c/o R lower first premolar pain. Startes he went to the dentist and was prescribed clindamycin, took first dose today. He also c/o epigastric abdominal pain, but no n/v/d. He denies any cough, shortness of breath, or chest pain. He reports the [address] [place] [address] dentist said its going to be a while before they schedule him for a dental extraction and he doesn't want to wait that long. He denies any other complaints. No other known exacerbating/relieving factors. Triage notes and Nursing notes were reviewed by myself. Any discrepancies are addressed above.     PAST MEDICAL HISTORY     Past Medical History:   Diagnosis Date    Aortic stenosis, mild to moderate     BPH (benign prostatic hyperplasia)     Carpal tunnel syndrome on right     rt hand    Chronic gout     DM2 (diabetes mellitus, type 2) (HCC)     Dyslipidemia     GERD (gastroesophageal reflux disease)     Heart failure with preserved ejection fraction (HCC)     History of alcohol abuse     History of atrial fibrillation     History of osteomyelitis     Hx of R foot wound, osteomyelitis July 2018 requiring surgery and IV Vanco    Hypertension, essential     Hypogonadism, male     Major depression     Mood disorder ([address]     Morbid obesity ([address]     DURAN (nonalcoholic steatohepatitis)     KIARA treated with BiPAP     Vitamin D deficiency        SURGICAL HISTORY       Past Surgical History:   Procedure Laterality Date    COLONOSCOPY N/A 11/15/2020    COLONOSCOPY DIAGNOSTIC performed by Araceli Anderson MD at [facility] Right     2018, R foot osteomyelitis    HIP SURGERY Left 6/29/2020    bilateral hip steroid injection, Left HIP FIRST performed by Tyler Núñez MD at [facility] N/A 10/26/2020    SIGMOIDOSCOPY DIAGNOSTIC FLEXIBLE performed by Celine Fowler MD at [facility]      as a baby    UPPER GASTROINTESTINAL ENDOSCOPY N/A 11/14/2020    EGD DIAGNOSTIC ONLY performed by Araceli Anderson MD at [facility]       Current Discharge Medication List      CONTINUE these medications which have NOT CHANGED    Details   pregabalin (LYRICA) 50 MG capsule Take 50 mg by mouth 3 times daily. clotrimazole-betamethasone (LOTRISONE) 1-0.05 % cream Apply topically 2 times daily.   Qty: 1 each, Refills: 0      potassium chloride (KLOR-CON M) 20 MEQ extended release tablet Take 1 tablet by mouth daily  Qty: 30 tablet, Refills: 2      apixaban (ELIQUIS) 5 MG TABS tablet Take 10mg by mouth two times daily for 6 days followed by 5mg by mouth two times daily  Qty: 60 tablet, Refills: 1      glycopyrrolate-formoterol (BEVESPI) 9-4.8 MCG/ACT AERO Inhale 2 puffs into the lungs 2 times daily  Qty: 10.7 g, Refills: 1      ferrous sulfate (IRON 325) 325 (65 Fe) MG tablet Take 1 tablet by mouth 2 times daily (with meals)  Qty: 30 tablet, Refills: 3      albuterol sulfate HFA (PROVENTIL HFA) 108 (90 Base) MCG/ACT inhaler Inhale 2 puffs into the lungs every 6 hours as needed for Wheezing  Qty: 1 Inhaler, Refills: 3      vitamin C (ASCORBIC ACID) 500 MG tablet Take 2 tablets by mouth daily  Qty: 30 tablet, Refills: 0      CHOLECALCIFEROL PO Take 2,000 Units by mouth daily      atorvastatin (LIPITOR) 40 MG tablet Take 40 mg by mouth nightly      acetaminophen (TYLENOL) 325 MG tablet Take 650 mg by mouth every 4 hours as needed for Pain      busPIRone (BUSPAR) 10 MG tablet Take 10 mg by mouth 2 times daily pantoprazole (PROTONIX) 40 MG tablet Take 1 tablet by mouth daily  Qty: 5 tablet, Refills: 0      allopurinol (ZYLOPRIM) 300 MG tablet Take 1 tablet by mouth daily  Qty: 90 tablet, Refills: 0    Associated Diagnoses: H/O: gout; Medication monitoring encounter; Chronic tophaceous gout      hydrALAZINE (APRESOLINE) 50 MG tablet Take 50 mg by mouth 2 times daily       Multiple Vitamins-Minerals (THERAPEUTIC MULTIVITAMIN-MINERALS) tablet Take 1 tablet by mouth daily      Probiotic Product (SOBIA-BID PROBIOTIC PO) Take 1 tablet by mouth daily       ondansetron (ZOFRAN) 4 MG tablet Take 4 mg by mouth every 8 hours as needed for Nausea or Vomiting      tamsulosin (FLOMAX) 0.4 MG capsule Take 0.4 mg by mouth nightly       folic acid (FOLVITE) 1 MG tablet Take 1 mg by mouth daily      furosemide (LASIX) 40 MG tablet Take 40 mg by mouth daily       sitaGLIPtan-metformin (JANUMET)  MG per tablet Take 1 tablet by mouth 2 times daily (with meals)       senna (SENOKOT) 8.6 MG tablet Take 1 tablet by mouth 2 times daily      ARIPiprazole (ABILIFY PO) Take 15 mg by mouth daily     Associated Diagnoses: DDD (degenerative disc disease)      Citalopram Hydrobromide (CELEXA PO) Take 30 mg by mouth daily From [facility] professional services       Blood Glucose Monitoring Suppl (FREESTYLE LITE) ARTIE Patient needs all supplies for qd testing.  DX: 250.00  Qty: 1 Device, Refills: 11             ALLERGIES     Penicillins    FAMILY HISTORY       Family History   Problem Relation Age of Onset    Heart Disease Mother         MI    Cancer Paternal Aunt         lung        SOCIAL HISTORY       Social History     Socioeconomic History    Marital status:      Spouse name: None    Number of children: None    Years of education: None    Highest education level: None   Occupational History    None   Tobacco Use    Smoking status: Never Smoker    Smokeless tobacco: Never Used   Vaping Use    Vaping Use: Never used Substance and Sexual Activity    Alcohol use: Not Currently     Comment: hx of abuse    Drug use: No    Sexual activity: Not Currently   Other Topics Concern    None   Social History Narrative    None     Social Determinants of Health     Financial Resource Strain:     Difficulty of Paying Living Expenses:    Food Insecurity:     Worried About Running Out of Food in the Last Year:     Ran Out of Food in the Last Year:    Transportation Needs:     Lack of Transportation (Medical):  Lack of Transportation (Non-Medical):    Physical Activity:     Days of Exercise per Week:     Minutes of Exercise per Session:    Stress:     Feeling of Stress :    Social Connections:     Frequency of Communication with Friends and Family:     Frequency of Social Gatherings with Friends and Family:     Attends Confucianist Services:     Active Member of Clubs or Organizations:     Attends Club or Organization Meetings:     Marital Status:    Intimate Partner Violence:     Fear of Current or Ex-Partner:     Emotionally Abused:     Physically Abused:     Sexually Abused:        REVIEW OF SYSTEMS     Review of Systems   Constitutional: Negative for chills and fever. Respiratory: Negative for cough and shortness of breath. Cardiovascular: Negative for chest pain and leg swelling. Gastrointestinal: Positive for abdominal pain. Negative for vomiting. Musculoskeletal: Negative for back pain and neck pain. Skin: Negative for rash and wound. Neurological: Negative for weakness and numbness. All other systems reviewed and are negative. Except as noted above the remainder of the review of systems was reviewed and is. PHYSICAL EXAM    (up to 7 for level 4, 8 or more for level 5)     ED Triage Vitals [10/22/21 1849]   BP Temp Temp Source Pulse Resp SpO2 Height Weight   (S) (!) 163/98 100.1 °F (37.8 °C) Oral 89 19 98 % -- 298 lb (135.2 kg)       Physical Exam  Vitals and nursing note reviewed. Constitutional:       General: He is awake. HENT:      Head: Normocephalic and atraumatic. Mouth/Throat:      Mouth: Mucous membranes are moist.      Comments: Tenderness to R lower first premolar, +gingivitis, poor dentition, however no abscess, no fluctuance, no facial swelling, no ludwigs, posterior oropharynx normal, no lymphadenopathy  Eyes:      General: Lids are normal.      Conjunctiva/sclera: Conjunctivae normal.   Neck:      Vascular: No JVD. Trachea: No tracheal deviation. Cardiovascular:      Rate and Rhythm: Normal rate and regular rhythm. Pulses: Normal pulses. Heart sounds: No murmur heard. No friction rub. No gallop. Pulmonary:      Effort: Pulmonary effort is normal.      Breath sounds: Normal breath sounds. No wheezing, rhonchi or rales. Abdominal:      Palpations: Abdomen is soft. Tenderness: There is abdominal tenderness in the epigastric area. There is no guarding or rebound. Musculoskeletal:      Cervical back: Neck supple. Skin:     General: Skin is warm. Findings: No rash. Neurological:      General: No focal deficit present. Mental Status: He is alert. Sensory: No sensory deficit. Motor: No weakness. Psychiatric:         Behavior: Behavior is cooperative. DIAGNOSTIC RESULTS     EKG:(none if blank)  All EKG's are interpreted by [facility] who either signs or Co-signs this chart in the absence of a cardiologist.    1930: NSR at rate of 90, LAD, nonspecific t wave abnormality    RADIOLOGY: (none if blank)   Interpretation per the Radiologistbelow, if available at the time of this note:    CT ABDOMEN PELVIS W IV CONTRAST Additional Contrast? None   Final Result   1. No acute intra-abdominal or pelvic findings. 2. There is a subtle ill-defined focal area of low-attenuation within the medial right hepatic lobe on axial image 22.  This is indeterminate and could represent mild focal fatty infiltration, however, cannot exclude an occult lesion. Recommend routine    liver mass protocol CT or MR imaging for additional characterization. 3. Diffuse soft tissue calcifications are again seen within the subcutaneous soft tissues of the abdomen and pelvis. There is osseous bridging of the posterior right acetabulum and the rightward inferior sacrum. Cannot exclude an underlying connective    tissue disorder. This appears similar to the prior examination. **This report has been created using voice recognition software. It may contain minor errors which are inherent in voice recognition technology. **      Final report electronically signed by Dr. Twyla Tejada on 10/22/2021 9:49 PM          LABS:  Labs Reviewed   CBC WITH AUTO DIFFERENTIAL - Abnormal; Notable for the following components:       Result Value    WBC 11.6 (*)     RDW-SD 46.2 (*)     Segs Absolute 10.0 (*)     Lymphocytes Absolute 0.8 (*)     All other components within normal limits   COMPREHENSIVE METABOLIC PANEL W/ REFLEX TO MG FOR LOW K - Abnormal; Notable for the following components:    Glucose 222 (*)     Alkaline Phosphatase 197 (*)     All other components within normal limits   TROPONIN - Abnormal; Notable for the following components:    Troponin T 0.026 (*)     All other components within normal limits   LACTIC ACID, PLASMA - Abnormal; Notable for the following components:    Lactic Acid 2.2 (*)     All other components within normal limits   GLOMERULAR FILTRATION RATE, ESTIMATED - Abnormal; Notable for the following components:    Est, Glom Filt Rate 77 (*)     All other components within normal limits   URINE WITH REFLEXED MICRO - Abnormal; Notable for the following components:    Glucose, Ur 500 (*)     Blood, Urine TRACE (*)     Protein, UA 30 (*)     All other components within normal limits   TROPONIN - Abnormal; Notable for the following components:    Troponin T 0.023 (*)     All other components within normal limits CULTURE, BLOOD 1   CULTURE, BLOOD 2   LIPASE   ANION GAP   LACTATE, SEPSIS   LACTATE, SEPSIS       All other labs were within normal range or not returned as of this dictation. Please note, any cultures that may have been sent were not resulted at the time of this patient visit. EMERGENCY DEPARTMENT COURSE andMedical Decision Making:     MDM/   Pt presents to the ER complaining primarily of dental pain, however also c/o epigastric abd pain, borderline temperature. Found to have borderline elevated lactic acid, likely related to dehydration, no clear evidence of severe bacterial infection, patient did start abx today, will continue. Also noted elevated troponin, decrease on redraw. Pt had no chest pain at any point, no sob, no exertional symptoms to suggest anginal equivalent. Recent hospital visit also with elevated troponin level, recent negative stress test.  Pt feeling better, tolerating PO, stable for dc home, agrees with f/u with pcp , cardiology, dentist.  Pt also counseled extensively regarding ER return precutions. Strict returnprecautions and follow up instructions were discussed with the patient with which the patient agrees        ED Medications administered this visit:    Medications   metoprolol (LOPRESSOR) injection 5 mg (5 mg IntraVENous Not Given 10/22/21 2232)   HYDROcodone-acetaminophen (NORCO) 5-325 MG per tablet 1 tablet (1 tablet Oral Given 10/22/21 1924)   0.9 % sodium chloride bolus (0 mLs IntraVENous Stopped 10/22/21 2122)   iopamidol (ISOVUE-370) 76 % injection 80 mL (80 mLs IntraVENous Given 10/22/21 2042)   HYDROcodone-acetaminophen (NORCO) 5-325 MG per tablet 1 tablet (1 tablet Oral Given 10/22/21 2132)         Procedures: (None if blank)       CLINICAL       1. Pain, dental    2. Epigastric pain    3. Dehydration    4. Elevated troponin    5.  Abnormal CT scan          DISPOSITION/PLAN   DISPOSITION Decision To Discharge 10/22/2021 10:33:15 PM      PATIENT REFERRED TO:  Daniel Martinez, APRN - CNP  [facility]  [phone]    In 2 days      Your dentist in 2 days    In 2 days      Betsy Cabral MD  [facility], [facility] [address]  [phone]    In 2 days        DISCHARGE MEDICATIONS:  Current Discharge Medication List                 (Please note that portions of this note were completed with a voice recognition program.  Efforts were made to edit the dictations but occasionallywords are mis-transcribed.)      Diana Mosher MD,FACEP (electronically signed)  Attending Physician, Emergency Department       Job Dean MD  10/22/21 4401

## 2021-10-22 NOTE — ED NOTES
Patient to the ED with complaints of dental pain. Patient states that he has been taking antibiotics for \"a bad tooth\" on his right low jaw for past several days but states his pain has only worsened after past 2 days. Patient states that ibuprofen does not provide relief. He states that he now has felt febrile. Patient is resting in bed with easy and unlabored respirations. Call light in reach. Patient denies further complaints or concerns.         Casa Leon RN  10/22/21 5802

## 2021-10-22 NOTE — ED NOTES
Pt ambulated to BR at this time, steady gait noted, but weak.    Pt medicated per [facility]  EKG and UA completed at this time     Shashank Hylton RN  10/22/21 1925

## 2021-10-23 LAB
EKG ATRIAL RATE: 90 BPM
EKG P AXIS: 46 DEGREES
EKG P-R INTERVAL: 152 MS
EKG Q-T INTERVAL: 334 MS
EKG QRS DURATION: 78 MS
EKG QTC CALCULATION (BAZETT): 408 MS
EKG R AXIS: -37 DEGREES
EKG T AXIS: -31 DEGREES
EKG VENTRICULAR RATE: 90 BPM

## 2021-10-23 PROCEDURE — 93010 ELECTROCARDIOGRAM REPORT: CPT | Performed by: NUCLEAR MEDICINE

## 2021-10-23 NOTE — ED NOTES
Pt resting in cot with respirations even and unlabored. Pt's family is at bedside. RN updated pt on POC. Pt states pain is 8/10. Pt voices no other concern at this time. RN dimmed lights for comfort. Will continue to monitor.       Stephane Arvizu RN  10/22/21 2028

## 2021-10-23 NOTE — ED NOTES
RN medicated pt per [facility]. Pt resting in cot with respirations even and unlabored with family at bedside. Pt voices no concern or need at this time. Call light is within reach. Will continue to monitor.       Everett Peng RN  10/22/21 3669

## 2021-10-23 NOTE — ED NOTES
Pt resting in cot with respirations even and unlabored. Pt states pain has not improved Dr. Miracle Rivera is aware. Pt states he would like a turkey sand which. Rn provided pt with a lunch box and a ginger ale. Will continue to monitor.       Asa Mcdermott RN  10/22/21 4679

## 2021-10-28 LAB
BLOOD CULTURE, ROUTINE: NORMAL
BLOOD CULTURE, ROUTINE: NORMAL

## 2021-11-03 PROBLEM — K76.0 HEPATIC STEATOSIS: Status: ACTIVE | Noted: 2021-11-03

## 2021-11-03 PROBLEM — K76.9 LESION OF RIGHT LOBE OF LIVER: Status: ACTIVE | Noted: 2021-11-03

## 2021-11-03 PROBLEM — Z79.01 ANTICOAGULATED: Status: ACTIVE | Noted: 2021-11-03

## 2021-11-03 PROBLEM — R74.8 ELEVATED ALKALINE PHOSPHATASE LEVEL: Status: ACTIVE | Noted: 2021-11-03

## 2021-11-03 PROBLEM — D72.829 LEUKOCYTOSIS: Status: ACTIVE | Noted: 2021-11-03

## 2021-11-08 ENCOUNTER — TELEPHONE (OUTPATIENT)
Dept: CARDIOLOGY CLINIC | Age: 51
End: 2021-11-08

## 2021-11-08 NOTE — TELEPHONE ENCOUNTER
Pt needs pre op for EGD on 12/27/2021 with Dr. Shanae Butler. Pt has no showed last several appt, Call to pt to make appt, no vm set up.

## 2021-11-11 ENCOUNTER — NURSE ONLY (OUTPATIENT)
Dept: LAB | Age: 51
End: 2021-11-11

## 2021-11-11 ENCOUNTER — OFFICE VISIT (OUTPATIENT)
Dept: RHEUMATOLOGY | Age: 51
End: 2021-11-11
Payer: MEDICARE

## 2021-11-11 VITALS
HEIGHT: 68 IN | BODY MASS INDEX: 44.86 KG/M2 | WEIGHT: 296 LBS | DIASTOLIC BLOOD PRESSURE: 78 MMHG | HEART RATE: 87 BPM | OXYGEN SATURATION: 93 % | SYSTOLIC BLOOD PRESSURE: 138 MMHG

## 2021-11-11 DIAGNOSIS — M1A.9XX1 CHRONIC TOPHACEOUS GOUT: Primary | ICD-10-CM

## 2021-11-11 DIAGNOSIS — M19.90 INFLAMMATORY ARTHRITIS: ICD-10-CM

## 2021-11-11 DIAGNOSIS — M46.1 BILATERAL SACROILIITIS (HCC): ICD-10-CM

## 2021-11-11 DIAGNOSIS — M1A.9XX1 CHRONIC TOPHACEOUS GOUT: ICD-10-CM

## 2021-11-11 DIAGNOSIS — Z51.81 MEDICATION MONITORING ENCOUNTER: ICD-10-CM

## 2021-11-11 DIAGNOSIS — M54.41 CHRONIC MIDLINE LOW BACK PAIN WITH BILATERAL SCIATICA: ICD-10-CM

## 2021-11-11 DIAGNOSIS — M54.42 CHRONIC MIDLINE LOW BACK PAIN WITH BILATERAL SCIATICA: ICD-10-CM

## 2021-11-11 DIAGNOSIS — R21 RASH: ICD-10-CM

## 2021-11-11 DIAGNOSIS — M15.9 PRIMARY OSTEOARTHRITIS INVOLVING MULTIPLE JOINTS: ICD-10-CM

## 2021-11-11 DIAGNOSIS — G89.29 CHRONIC MIDLINE LOW BACK PAIN WITH BILATERAL SCIATICA: ICD-10-CM

## 2021-11-11 LAB
GFR SERPL CREATININE-BSD FRML MDRD: 77 ML/MIN/1.73M2
URIC ACID: 8.3 MG/DL (ref 3.7–7)

## 2021-11-11 PROCEDURE — 99214 OFFICE O/P EST MOD 30 MIN: CPT | Performed by: NURSE PRACTITIONER

## 2021-11-11 PROCEDURE — 3017F COLORECTAL CA SCREEN DOC REV: CPT | Performed by: NURSE PRACTITIONER

## 2021-11-11 PROCEDURE — G8417 CALC BMI ABV UP PARAM F/U: HCPCS | Performed by: NURSE PRACTITIONER

## 2021-11-11 PROCEDURE — G8427 DOCREV CUR MEDS BY ELIG CLIN: HCPCS | Performed by: NURSE PRACTITIONER

## 2021-11-11 PROCEDURE — 1036F TOBACCO NON-USER: CPT | Performed by: NURSE PRACTITIONER

## 2021-11-11 PROCEDURE — G8484 FLU IMMUNIZE NO ADMIN: HCPCS | Performed by: NURSE PRACTITIONER

## 2021-11-11 ASSESSMENT — ENCOUNTER SYMPTOMS
NAUSEA: 0
COUGH: 0
DIARRHEA: 0
BACK PAIN: 1
SHORTNESS OF BREATH: 0
EYE ITCHING: 0
ABDOMINAL PAIN: 0
EYE PAIN: 0
TROUBLE SWALLOWING: 0
CONSTIPATION: 0

## 2021-11-11 NOTE — PROGRESS NOTES
[facility] RHEUMATOLOGY FOLLOW UP NOTE       Date Of Service: 11/11/2021  Provider: SANDRA Camejo - CNP    Name: Georgiana Patton   MRN: [id number]    Chief Complaint(s)     Chief Complaint   Patient presents with    Follow-up     2 month        History of Present Illness (HPI)     Georgiana Patton  is a(n)51 y.o. male with a hx of abnormal liver function, alcohol abuse, anemia, arthropathy, carpal tunnel syndrome (right), depression, fatigue, foot pain, HTN, hyperuricemia, DURAN, obesity, KIARA, Y6EM, diastolic heart failure, DDD here for the f/u evaluation of tophaceous gout. Interval hx:    - no new issues. Still has not had TB gold blood test completed    pain affecting the hands, wrists, knees, ankles, toes, neck, back  Pain on a scale 0-10: 6/10  Type of pain: intermittent, varying pain. Cramping, numbness/tingling, shooting legs down left leg  Timing: morning  Aggravating factors: weather changes  Alleviating factors: percocet, hot baths    Associated symptoms:  + swelling/  Redness/ warmth (left hand), + AM stiffness lasting ~ several hours      REVIEW OF SYSTEMS: (ROS)    Review of Systems   Constitutional: Negative for fatigue, fever and unexpected weight change. HENT: Negative for congestion and trouble swallowing. Dry  mouth   Eyes: Negative for pain and itching. Respiratory: Negative for cough and shortness of breath. Cardiovascular: Negative for chest pain and leg swelling. Gastrointestinal: Negative for abdominal pain, constipation, diarrhea and nausea. Endocrine: Negative for cold intolerance and heat intolerance. Genitourinary: Negative for difficulty urinating, frequency and urgency. Musculoskeletal: Positive for arthralgias, back pain, myalgias and neck pain. Negative for joint swelling. Skin: Positive for rash (scales on face). Neurological: Positive for weakness (left leg) and numbness (left leg). Negative for dizziness and headaches. Psychiatric/Behavioral: Positive for sleep disturbance. The patient is not nervous/anxious.          Memory issues, depression       PAST MEDICAL HISTORY      Past Medical History:   Diagnosis Date    Aortic stenosis, mild to moderate     BPH (benign prostatic hyperplasia)     Carpal tunnel syndrome on right     rt hand    Chronic gout     DM2 (diabetes mellitus, type 2) (HCC)     Dyslipidemia     GERD (gastroesophageal reflux disease)     Heart failure with preserved ejection fraction (HCC)     History of alcohol abuse     History of atrial fibrillation     History of osteomyelitis     Hx of R foot wound, osteomyelitis July 2018 requiring surgery and IV Vanco    Hypertension, essential     Hypogonadism, male     Major depression     Mood disorder [facility]     Morbid obesity ([facility]     DURAN (nonalcoholic steatohepatitis)     KIARA treated with BiPAP     Vitamin D deficiency        PAST SURGICAL HISTORY      Past Surgical History:   Procedure Laterality Date    COLONOSCOPY N/A 11/15/2020    COLONOSCOPY DIAGNOSTIC performed by Mitchell Hopson MD at [facility] Right     2018, R foot osteomyelitis    HIP SURGERY Left 6/29/2020    bilateral hip steroid injection, Left HIP FIRST performed by Avni Cameron MD at [facility] N/A 10/26/2020    [facility] performed by Chi Rebollar MD at [facility]      as a baby    UPPER GASTROINTESTINAL ENDOSCOPY N/A 11/14/2020    EGD DIAGNOSTIC ONLY performed by Mitchell Hopson MD at [address] [facility]       FAMILY HISTORY      Family History   Problem Relation Age of Onset    Heart Disease Mother         MI    Cancer Paternal Aunt         lung       SOCIAL HISTORY      Social History     Tobacco History     Smoking Status  Never Smoker    Smokeless Tobacco Use  Never Used          Alcohol History     Alcohol Use Status  No Comment  hx of abuse          Drug Use     Drug mg by mouth every 8 hours as needed for Nausea or Vomiting      tamsulosin (FLOMAX) 0.4 MG capsule Take 0.4 mg by mouth nightly       folic acid (FOLVITE) 1 MG tablet Take 1 mg by mouth daily      furosemide (LASIX) 40 MG tablet Take 40 mg by mouth daily       sitaGLIPtan-metformin (JANUMET)  MG per tablet Take 1 tablet by mouth 2 times daily (with meals)       senna (SENOKOT) 8.6 MG tablet Take 1 tablet by mouth 2 times daily      ARIPiprazole (ABILIFY PO) Take 15 mg by mouth daily       Citalopram Hydrobromide (CELEXA PO) Take 30 mg by mouth daily From [facility] professional services       Blood Glucose Monitoring Suppl (FREESTYLE LITE) ARTIE Patient needs all supplies for qd testing. DX: 250.00 1 Device 11     No current facility-administered medications for this visit. PHYSICAL EXAMINATION / OBJECTIVE   Objective:  /78 (Site: Right Lower Arm, Position: Sitting, Cuff Size: Medium Adult)   Pulse 87   Ht 5' 7.99\" (1.727 m)   Wt 296 lb (134.3 kg)   SpO2 93%   BMI 45.02 kg/m²     Physical Exam  Vitals reviewed. Constitutional:       Appearance: He is well-developed. Cardiovascular:      Rate and Rhythm: Normal rate and regular rhythm. Pulmonary:      Effort: Pulmonary effort is normal.      Breath sounds: Normal breath sounds. Abdominal:      Palpations: Abdomen is soft. Tenderness: There is no abdominal tenderness. Musculoskeletal:      Cervical back: Normal range of motion and neck supple. Skin:     General: Skin is warm and dry. Findings: No rash. Comments: Redness bilateral eye lies, and nasolabial folds,   Scars -- left posterior chest wall  Nail thickening bilateral toenails  Onycholysis Rt fingernail  Transverse nail grooves bilat. Redness around nose, eyebrows   Neurological:      Mental Status: He is alert and oriented to person, place, and time. Deep Tendon Reflexes: Reflexes are normal and symmetric.    Psychiatric:         Thought Content: Thought content normal.       Upper extremities:   Shoulders:  + tender bilaterally  Elbows:      + swelling bilat  Wrists        Tender and swelling bilaterally; limited flexion and extension  Hands:     Tender bilat PIPs.      Lower extremities:  Hips:      + tender left  Knees :   + tender left  Ankles: + tender and swelling bilat  Feet:       + MTP compression left, tenderness left midfoot        RAPID 3:   11/11/2021 --- RAPID 3: 5.3 + 8.5 + 7.0 = 20.8     Remission: <3  Low Disease Activity: <6  Moderate Disease Activity: >=6 and <=12  High Disease Activity: >12     LABS    CBC  Lab Results   Component Value Date    WBC 11.6 10/22/2021    RBC 5.23 10/22/2021    RBC 4.55 04/15/2012    HGB 15.8 10/22/2021    HCT 47.0 10/22/2021    MCV 89.9 10/22/2021    MCH 30.2 10/22/2021    MCHC 33.6 10/22/2021    RDW 19.9 04/06/2020     10/22/2021       CMP  Lab Results   Component Value Date    CALCIUM 9.4 10/22/2021    LABALBU 4.1 10/22/2021    LABALBU 4.4 01/26/2012    PROT 7.3 10/22/2021     10/22/2021    K 3.8 10/22/2021    CO2 25 10/22/2021     10/22/2021    BUN 20 10/22/2021    CREATININE 1.2 10/22/2021    ALKPHOS 197 10/22/2021    ALT 31 10/22/2021    AST 22 10/22/2021       HgBA1c: No components found for: HGBA1C    Lab Results   Component Value Date    VITD25 26 10/17/2020         Lab Results   Component Value Date    ANASCRN None Detected 10/06/2020     No results found for: SSA  No results found for: SSB  No results found for: ANTI-SMITH  Lab Results   Component Value Date    DSDNAAB SEE BELOW 04/27/2016      No results found for: ANTIRNP  No results found for: C3, C4  No results found for: CCPAB  Lab Results   Component Value Date    RF <10 01/23/2019       No components found for: CANCASCRN, APANCASCRN  Lab Results   Component Value Date    SEDRATE 9 04/06/2020     Lab Results   Component Value Date    CRP 25.10 (H) 09/23/2020       RADIOLOGY:         ASSESSMENT/PLAN    Assessment   Plan Undiff axial spondyloarthritis  Chronic low back pain  - prior hx of dermatomyositis and rheumatoid arthritis. Serologies inconsistent with RA, normal CK/aldolase despite d/c of therapy. Patient with active inflammatory arthritis, + enthesopathy, + CT/abd pelvis consistent with sacroiliitis and SI joints fusions bilat, and colonic thickening (neg C scope eval 11/2020)   - plan to start anti-TNF vs IL17 if TB gold negative- needs completed    Left foot drop  DVT- on coumdain  - ? post COVID vs other- ? Sensory neuropathy vs radiculopathy given weakness, decreased proprioception and pain sensation in left foot. - EMG/NCV ordered- was not able to have completed due to swelling    Rash- face, back. - + nail changes. ? Tinea vs psoriasis vs eczema vs other.    - referral to dermatology- did not go    Polyarticular tophaceous gout  - prior left elbow aspiration crystal studies + for monosodium urate   - continue allopurinol 500 mg daily- titrate to goal uric acid < 5 mg/dl   - need uric acid level rechecked    Osteoarthritis of multiple joints   - Percocet PRN    Medication monitoring   - CBC, CMP, sed rate, CRP q 8 weeks   - TB gold previously ordered      No follow-ups on file. Electronically signed by SANDRA Tucker - CNP on 11/11/2021 at 9:57 AM    New Prescriptions    No medications on file       Thank you for allowing me to participate in the care of this patient. Please call if there are any questions.     [phone]

## 2021-11-14 LAB
QUANTI TB GOLD PLUS: NEGATIVE
QUANTI TB1 MINUS NIL: 0 IU/ML (ref 0–0.34)
QUANTI TB2 MINUS NIL: 0 IU/ML (ref 0–0.34)
QUANTIFERON MITOGEN MINUS NIL: 7.52 IU/ML
QUANTIFERON NIL: 0.01 IU/ML

## 2021-11-15 ENCOUNTER — TELEPHONE (OUTPATIENT)
Dept: RHEUMATOLOGY | Age: 51
End: 2021-11-15

## 2021-11-15 ENCOUNTER — HOSPITAL ENCOUNTER (OUTPATIENT)
Dept: MRI IMAGING | Age: 51
Discharge: HOME | End: 2021-11-15
Payer: MEDICARE

## 2021-11-15 DIAGNOSIS — K76.89 OTHER SPECIFIED DISEASES OF LIVER: ICD-10-CM

## 2021-11-15 PROCEDURE — A9579 GAD-BASE MR CONTRAST NOS,1ML: HCPCS | Performed by: NURSE PRACTITIONER

## 2021-11-15 PROCEDURE — 6360000004 HC RX CONTRAST MEDICATION: Performed by: NURSE PRACTITIONER

## 2021-11-15 PROCEDURE — 74183 MRI ABD W/O CNTR FLWD CNTR: CPT

## 2021-11-15 RX ADMIN — GADOTERIDOL 20 ML: 279.3 INJECTION, SOLUTION INTRAVENOUS at 19:06

## 2021-11-15 NOTE — TELEPHONE ENCOUNTER
----- Message from SANDRA Gallardo CNP sent at 11/11/2021  4:25 PM EST -----  Uric acid is elevated. Can you please confirm that he is taking the allopurinol?

## 2021-11-15 NOTE — TELEPHONE ENCOUNTER
----- Message from William Quesada DO sent at 11/15/2021  7:47 AM EST -----  Lab testing to evaluate for prior tuberculosis exposure was negative. At this time would like to pursue anti-TNF therapy with Enbrel 50 mg every week for the underlying inflammatory arthritis affecting the spine and other joints. TNF INHIBITORS can cause increased risk of TB reactivation, oppurtunistic infections, lupus like illness, rash, hypersensitivity reaction, infusion reactions, demyelinating disease and possible risk of malignancies and lung diseases and were explained to the patient    Please let me know if the patient is willing to pursue the the treatment as outlined above.

## 2021-11-29 ENCOUNTER — OFFICE VISIT (OUTPATIENT)
Dept: CARDIOLOGY CLINIC | Age: 51
End: 2021-11-29
Payer: MEDICARE

## 2021-11-29 VITALS
HEIGHT: 68 IN | WEIGHT: 289 LBS | HEART RATE: 67 BPM | SYSTOLIC BLOOD PRESSURE: 151 MMHG | BODY MASS INDEX: 43.8 KG/M2 | DIASTOLIC BLOOD PRESSURE: 100 MMHG

## 2021-11-29 DIAGNOSIS — E78.5 DYSLIPIDEMIA: Chronic | ICD-10-CM

## 2021-11-29 DIAGNOSIS — G47.33 OSA TREATED WITH BIPAP: Chronic | ICD-10-CM

## 2021-11-29 DIAGNOSIS — I10 HYPERTENSION, ESSENTIAL: Chronic | ICD-10-CM

## 2021-11-29 DIAGNOSIS — I50.32 CHRONIC HEART FAILURE WITH PRESERVED EJECTION FRACTION (HCC): Primary | Chronic | ICD-10-CM

## 2021-11-29 DIAGNOSIS — Z86.79 HISTORY OF ATRIAL FIBRILLATION: Chronic | ICD-10-CM

## 2021-11-29 DIAGNOSIS — I35.0 AORTIC STENOSIS, MILD: Chronic | ICD-10-CM

## 2021-11-29 PROCEDURE — G8417 CALC BMI ABV UP PARAM F/U: HCPCS | Performed by: INTERNAL MEDICINE

## 2021-11-29 PROCEDURE — G8427 DOCREV CUR MEDS BY ELIG CLIN: HCPCS | Performed by: INTERNAL MEDICINE

## 2021-11-29 PROCEDURE — 99214 OFFICE O/P EST MOD 30 MIN: CPT | Performed by: INTERNAL MEDICINE

## 2021-11-29 PROCEDURE — 1036F TOBACCO NON-USER: CPT | Performed by: INTERNAL MEDICINE

## 2021-11-29 PROCEDURE — G8484 FLU IMMUNIZE NO ADMIN: HCPCS | Performed by: INTERNAL MEDICINE

## 2021-11-29 PROCEDURE — 3017F COLORECTAL CA SCREEN DOC REV: CPT | Performed by: INTERNAL MEDICINE

## 2021-11-29 RX ORDER — HYDRALAZINE HYDROCHLORIDE 50 MG/1
50 TABLET, FILM COATED ORAL 3 TIMES DAILY
Qty: 90 TABLET | Refills: 7 | Status: ON HOLD | OUTPATIENT
Start: 2021-11-29 | End: 2022-01-06 | Stop reason: HOSPADM

## 2021-11-29 NOTE — PROGRESS NOTES
Chief Complaint   Patient presents with    Check-Up    Cardiac Clearance     EGD    Congestive Heart Failure   Originally patient establish cardiologist from [facility]        Pt here for clearance - EGD on 12-27-21 with Dr. Austin Steel 46 lb since dec 2020    EKG done 10-22-21    Pt did not bring a medication list. Pt states nothing has changed.      Leg edema +1 -chronic - not worse  Sob on exertion chronic    Denied  Chest pain, dizziness or palpitations  Non wt gaibn    Hxof  chest wall tenderness    nevere smoked    FHX  Mother had MI at her 52's      Past- 2012 had cp and abn nuc then and did not want cath that time    Patient Active Problem List   Diagnosis    History of atrial fibrillation    BPH (benign prostatic hyperplasia)    Carpal tunnel syndrome on right    Chronic gout    DM2 (diabetes mellitus, type 2) ([address]    Heart failure with preserved ejection fraction ([address]    History of alcohol abuse    History of osteomyelitis    Hypogonadism, male    Major depression    Morbid obesity ([address]    DURAN (nonalcoholic steatohepatitis)    KIARA treated with BiPAP    Vitamin D deficiency    Normocytic anemia    Physical deconditioning    Mood disorder (HCC)    Hallucinations    GERD (gastroesophageal reflux disease)    Wound of buttock    Primary osteoarthritis of left hip    Acute on chronic anemia    Dysphagia    Hypertension, essential    Dyslipidemia    Aortic stenosis, mild to moderate    Perirectal abscess    Chest pain    Lesion of right lobe of liver    Hepatic steatosis    Elevated alkaline phosphatase level    Anticoagulated    Leukocytosis       Past Surgical History:   Procedure Laterality Date    COLONOSCOPY N/A 11/15/2020    COLONOSCOPY DIAGNOSTIC performed by Sajan Harris MD at [facility] Right     2018, R foot osteomyelitis    HIP SURGERY Left 6/29/2020    bilateral hip steroid injection, Left HIP FIRST performed by Jeremiah Williamson MD at [facility] Organization Meetings: Not on file    Marital Status: Not on file   Intimate Partner Violence:     Fear of Current or Ex-Partner: Not on file    Emotionally Abused: Not on file    Physically Abused: Not on file    Sexually Abused: Not on file   Housing Stability:     Unable to Pay for Housing in the Last Year: Not on file    Number of Lina in the Last Year: Not on file    Unstable Housing in the Last Year: Not on file       Current Outpatient Medications   Medication Sig Dispense Refill    pantoprazole (PROTONIX) 40 MG tablet Take 1 tablet by mouth 2 times daily (before meals) 60 tablet 5    pregabalin (LYRICA) 50 MG capsule Take 50 mg by mouth 3 times daily.  clotrimazole-betamethasone (LOTRISONE) 1-0.05 % cream Apply topically 2 times daily.  1 each 0    potassium chloride (KLOR-CON M) 20 MEQ extended release tablet Take 1 tablet by mouth daily 30 tablet 2    apixaban (ELIQUIS) 5 MG TABS tablet Take 10mg by mouth two times daily for 6 days followed by 5mg by mouth two times daily 60 tablet 1    glycopyrrolate-formoterol (BEVESPI) 9-4.8 MCG/ACT AERO Inhale 2 puffs into the lungs 2 times daily 10.7 g 1    ferrous sulfate (IRON 325) 325 (65 Fe) MG tablet Take 1 tablet by mouth 2 times daily (with meals) 30 tablet 3    albuterol sulfate HFA (PROVENTIL HFA) 108 (90 Base) MCG/ACT inhaler Inhale 2 puffs into the lungs every 6 hours as needed for Wheezing 1 Inhaler 3    vitamin C (ASCORBIC ACID) 500 MG tablet Take 2 tablets by mouth daily 30 tablet 0    CHOLECALCIFEROL PO Take 2,000 Units by mouth daily      atorvastatin (LIPITOR) 40 MG tablet Take 40 mg by mouth nightly      acetaminophen (TYLENOL) 325 MG tablet Take 650 mg by mouth every 4 hours as needed for Pain      busPIRone (BUSPAR) 10 MG tablet Take 10 mg by mouth 2 times daily       allopurinol (ZYLOPRIM) 300 MG tablet Take 1 tablet by mouth daily 90 tablet 0    hydrALAZINE (APRESOLINE) 50 MG tablet Take 50 mg by mouth 2 times daily       Multiple Vitamins-Minerals (THERAPEUTIC MULTIVITAMIN-MINERALS) tablet Take 1 tablet by mouth daily      Probiotic Product (SOBIA-BID PROBIOTIC PO) Take 1 tablet by mouth daily       ondansetron (ZOFRAN) 4 MG tablet Take 4 mg by mouth every 8 hours as needed for Nausea or Vomiting      tamsulosin (FLOMAX) 0.4 MG capsule Take 0.4 mg by mouth nightly       folic acid (FOLVITE) 1 MG tablet Take 1 mg by mouth daily      furosemide (LASIX) 40 MG tablet Take 40 mg by mouth daily       sitaGLIPtan-metformin (JANUMET)  MG per tablet Take 1 tablet by mouth 2 times daily (with meals)       senna (SENOKOT) 8.6 MG tablet Take 1 tablet by mouth 2 times daily      ARIPiprazole (ABILIFY PO) Take 15 mg by mouth daily       Citalopram Hydrobromide (CELEXA PO) Take 30 mg by mouth daily From [facility] professional services       Blood Glucose Monitoring Suppl (FREESTYLE LITE) ARTIE Patient needs all supplies for qd testing. DX: 250.00 1 Device 11     No current facility-administered medications for this visit. Review of Systems -     General ROS: negative  Psychological ROS: negative  Hematological and Lymphatic ROS: No history of blood clots or bleeding disorder. Respiratory ROS: no cough,  or wheezing, the rest see HPI  Cardiovascular ROS: See HPI  Gastrointestinal ROS: negative  Genito-Urinary ROS: no dysuria, trouble voiding, or hematuria  Musculoskeletal ROS: negative  Neurological ROS: no TIA or stroke symptoms  Dermatological ROS: negative      Blood pressure (!) 198/100, pulse 77, height 5' 8\" (1.727 m), weight 289 lb (131.1 kg).         Physical Examination:    General appearance - alert, well appearing, and in no distress  HEENT- Pink conjunctiva  , Non-icteri sclera,PERRLA  Mental status - alert, oriented to person, place, and time  Neck - supple, no significant adenopathy, no JVD, or carotid bruits  Chest - clear to auscultation, no wheezes, rales or rhonchi, symmetric air entry  Heart - normal rate, regular rhythm, normal S1, S2, no murmurs, rubs, clicks or gallops  Abdomen - soft, nontender, nondistended, no masses or organomegaly  JORDAN- no CVA or flank tenderness, no suprapubic tenderness  Neurological - alert, oriented, normal speech, no focal findings or movement disorder noted  Musculoskeletal/limbs - no joint tenderness, deformity or swelling   - peripheral pulses normal, no pedal edema, no clubbing or cyanosis  Skin - normal coloration and turgor, no rashes, no suspicious skin lesions noted  Psych- appropriate mood and affect    Lab  No results for input(s): CKTOTAL, CKMB, CKMBINDEX, TROPONINI in the last 72 hours.   CBC:   Lab Results   Component Value Date    WBC 11.6 10/22/2021    RBC 5.23 10/22/2021    RBC 4.55 04/15/2012    HGB 15.8 10/22/2021    HCT 47.0 10/22/2021    MCV 89.9 10/22/2021    MCH 30.2 10/22/2021    MCHC 33.6 10/22/2021    RDW 19.9 04/06/2020     10/22/2021    MPV 10.7 10/22/2021     BMP:    Lab Results   Component Value Date     10/22/2021    K 3.8 10/22/2021     10/22/2021    CO2 25 10/22/2021    BUN 20 10/22/2021    LABALBU 4.1 10/22/2021    LABALBU 4.4 01/26/2012    CREATININE 1.2 10/22/2021    CALCIUM 9.4 10/22/2021    GFRAA >60 01/23/2019    LABGLOM 77 10/22/2021    GLUCOSE 222 10/22/2021    GLUCOSE 113 06/24/2018     Hepatic Function Panel:    Lab Results   Component Value Date    ALKPHOS 197 10/22/2021    ALT 31 10/22/2021    AST 22 10/22/2021    PROT 7.3 10/22/2021    BILITOT 0.8 10/22/2021    BILIDIR <0.2 09/06/2021    LABALBU 4.1 10/22/2021    LABALBU 4.4 01/26/2012     Magnesium:    Lab Results   Component Value Date    MG 1.4 11/19/2020     Warfarin PT/INR:  No components found for: PTPATWAR, PTINRWAR  HgBA1c:    Lab Results   Component Value Date    LABA1C 7.3 09/06/2021     FLP:  No results found for: TRIG, HDL, LDLCALC, LDLDIRECT, LABVLDL  TSH:  No results found for: TSH  Conclusions      Summary   Ejection fraction is visually estimated at 60%. Overall left ventricular function is normal.   The aortic valve leaflets were not well visualized. Aortic valve appears tricuspid. Aortic valve leaflets are somewhat thickened. Aortic valve leaflets are Mildly calcified. The maximum aortic valve gradient is 30 mmHg, the mean gradient is 15   mmHg, and the peak velocity is 275 cm/s. There is mild-to-moderate aortic stenosis with valve area of 1.5 sq cm. Signature      ----------------------------------------------------------------   Electronically signed by Becka Hernandez MD (Interpreting   physician) on 09/24/2020 at 04:36 PM   ----------------------------------------------------------------           Conclusions      Summary   Ejection fraction is visually estimated at 55%. Overall left ventricular function is normal.      Signature      ----------------------------------------------------------------   Electronically signed by Becka Hernandez MD (Interpreting   physician) on 09/07/2021 at 03:43 PM   ----------------------------------------------------------------    Conclusions      Summary   This Nuclear Medicine study was negative for ischemia . difficult scan due to patient body habitus   normal EF      Recommendation   Medical management. Signatures      ----------------------------------------------------------------   Electronically signed by Becka Hernandez MD (Interpreting   Cardiologist) on 09/07/2021 at 16:38   ----------------------------------------------------------------           EKG 9/30/19  Sinus  Rhythm   Low voltage in precordial leads.    -  Nonspecific T-abnormality.      ABNORMAL   Normal sinus rhythm  Low voltage QRS, consider pulmonary disease, pericardial effusion, or normal variant  Nonspecific T wave abnormality  Abnormal ECG  When compared with ECG of 09-NOV-2011 22:01,  No significant change was found  Confirmed by Estella Kwon MD, Eva Hernandez ([id number]) on 5/20/2020 8:02:47    Assessment       Diagnosis Orders 1. Chronic heart failure with preserved ejection fraction ([address]     2. Hypertension, essential     3. Dyslipidemia     4. Aortic stenosis, mild to moderate     5. History of atrial fibrillation     6. KIARA treated with BiPAP           Plan     The current meds and labs reviewed    Recent admission record in sept 2021 reviewed      Continue the current treatment and with constant vigilance to changes in symptoms and also any potential side effects. Return for care or seek medical attention immediately if symptoms got worse and/or develop new symptoms. Hypertension, on medical treatment. Seems to be under poor control. Patient is compliant with medical treatment. Increase hydralazine 50 tid from 50 bid     Echo and nuc stress- WNL  Asa 81 po qd  Limited walking capability  No cp free    Congestive heart failure: no evidence of fluid overload today, no recent hospitalization for CHF  Leg edema +1 stable  On lasix 40 po qd  Cont  kcl 20 meq po qd    Advised to lose wt     Hyperlipidemia: on statins, followed periodically. Patient need periodic lipid and liver profile. Hx of atr fib - no ekg found But documented on note from outside  Cont apixaban    Document reviewed from outside  Reported CHF and atr fib and meds   Lasix and apixaban for it  Need to get ekg with atr fib  Carla hold apixaban for 3 days for egd   May proceed for egd    D/w the pat plan of care    Pat from From cardiac stand Stable and doing well    Discussed use, benefit, and side effects of prescribed medications. All patient questions answered. Pt voiced understanding. Instructed to continue current medications, diet and exercise. Continue risk factor modification and medical management. Patient agreed with treatment plan. Follow up as directed.       RTC in 6  months        [facility]

## 2021-12-05 ENCOUNTER — HOSPITAL ENCOUNTER (EMERGENCY)
Age: 51
Discharge: HOME | End: 2021-12-05
Payer: MEDICARE

## 2021-12-05 ENCOUNTER — APPOINTMENT (OUTPATIENT)
Dept: CT IMAGING | Age: 51
End: 2021-12-05
Payer: MEDICARE

## 2021-12-05 VITALS
HEIGHT: 68 IN | RESPIRATION RATE: 16 BRPM | HEART RATE: 77 BPM | SYSTOLIC BLOOD PRESSURE: 155 MMHG | DIASTOLIC BLOOD PRESSURE: 88 MMHG | TEMPERATURE: 98.4 F | WEIGHT: 289 LBS | OXYGEN SATURATION: 93 % | BODY MASS INDEX: 43.8 KG/M2

## 2021-12-05 DIAGNOSIS — K04.7 DENTAL ABSCESS: Primary | ICD-10-CM

## 2021-12-05 LAB
ALBUMIN SERPL-MCNC: 4.2 G/DL (ref 3.5–5.1)
ALP BLD-CCNC: 173 U/L (ref 38–126)
ALT SERPL-CCNC: 22 U/L (ref 11–66)
ANION GAP SERPL CALCULATED.3IONS-SCNC: 9 MEQ/L (ref 8–16)
AST SERPL-CCNC: 19 U/L (ref 5–40)
BASOPHILS # BLD: 0.3 %
BASOPHILS ABSOLUTE: 0 THOU/MM3 (ref 0–0.1)
BILIRUB SERPL-MCNC: 0.5 MG/DL (ref 0.3–1.2)
BUN BLDV-MCNC: 16 MG/DL (ref 7–22)
C-REACTIVE PROTEIN: 2.35 MG/DL (ref 0–1)
CALCIUM SERPL-MCNC: 9.2 MG/DL (ref 8.5–10.5)
CHLORIDE BLD-SCNC: 104 MEQ/L (ref 98–111)
CO2: 29 MEQ/L (ref 23–33)
CREAT SERPL-MCNC: 1.1 MG/DL (ref 0.4–1.2)
EOSINOPHIL # BLD: 1.7 %
EOSINOPHILS ABSOLUTE: 0.2 THOU/MM3 (ref 0–0.4)
ERYTHROCYTE [DISTWIDTH] IN BLOOD BY AUTOMATED COUNT: 14.3 % (ref 11.5–14.5)
ERYTHROCYTE [DISTWIDTH] IN BLOOD BY AUTOMATED COUNT: 49 FL (ref 35–45)
GFR SERPL CREATININE-BSD FRML MDRD: 85 ML/MIN/1.73M2
GLUCOSE BLD-MCNC: 152 MG/DL (ref 70–108)
HCT VFR BLD CALC: 47.8 % (ref 42–52)
HEMOGLOBIN: 15.5 GM/DL (ref 14–18)
IMMATURE GRANS (ABS): 0.05 THOU/MM3 (ref 0–0.07)
IMMATURE GRANULOCYTES: 0.5 %
LACTIC ACID, SEPSIS: 1.6 MMOL/L (ref 0.5–1.9)
LYMPHOCYTES # BLD: 16 %
LYMPHOCYTES ABSOLUTE: 1.5 THOU/MM3 (ref 1–4.8)
MCH RBC QN AUTO: 30.3 PG (ref 26–33)
MCHC RBC AUTO-ENTMCNC: 32.4 GM/DL (ref 32.2–35.5)
MCV RBC AUTO: 93.5 FL (ref 80–94)
MONOCYTES # BLD: 9 %
MONOCYTES ABSOLUTE: 0.9 THOU/MM3 (ref 0.4–1.3)
NUCLEATED RED BLOOD CELLS: 0 /100 WBC
OSMOLALITY CALCULATION: 287.3 MOSMOL/KG (ref 275–300)
PLATELET # BLD: 173 THOU/MM3 (ref 130–400)
PMV BLD AUTO: 10.8 FL (ref 9.4–12.4)
POTASSIUM REFLEX MAGNESIUM: 4.4 MEQ/L (ref 3.5–5.2)
PROCALCITONIN: 0.21 NG/ML (ref 0.01–0.09)
RBC # BLD: 5.11 MILL/MM3 (ref 4.7–6.1)
SEG NEUTROPHILS: 72.5 %
SEGMENTED NEUTROPHILS ABSOLUTE COUNT: 7 THOU/MM3 (ref 1.8–7.7)
SODIUM BLD-SCNC: 142 MEQ/L (ref 135–145)
TOTAL PROTEIN: 7.3 G/DL (ref 6.1–8)
WBC # BLD: 9.6 THOU/MM3 (ref 4.8–10.8)

## 2021-12-05 PROCEDURE — 87040 BLOOD CULTURE FOR BACTERIA: CPT

## 2021-12-05 PROCEDURE — 2500000003 HC RX 250 WO HCPCS: Performed by: PHYSICIAN ASSISTANT

## 2021-12-05 PROCEDURE — 85025 COMPLETE CBC W/AUTO DIFF WBC: CPT

## 2021-12-05 PROCEDURE — 99283 EMERGENCY DEPT VISIT LOW MDM: CPT

## 2021-12-05 PROCEDURE — 83605 ASSAY OF LACTIC ACID: CPT

## 2021-12-05 PROCEDURE — 6360000004 HC RX CONTRAST MEDICATION: Performed by: PHYSICIAN ASSISTANT

## 2021-12-05 PROCEDURE — 84145 PROCALCITONIN (PCT): CPT

## 2021-12-05 PROCEDURE — 36415 COLL VENOUS BLD VENIPUNCTURE: CPT

## 2021-12-05 PROCEDURE — 6370000000 HC RX 637 (ALT 250 FOR IP): Performed by: PHYSICIAN ASSISTANT

## 2021-12-05 PROCEDURE — 2580000003 HC RX 258: Performed by: PHYSICIAN ASSISTANT

## 2021-12-05 PROCEDURE — 86140 C-REACTIVE PROTEIN: CPT

## 2021-12-05 PROCEDURE — 70491 CT SOFT TISSUE NECK W/DYE: CPT

## 2021-12-05 PROCEDURE — 80053 COMPREHEN METABOLIC PANEL: CPT

## 2021-12-05 PROCEDURE — 96365 THER/PROPH/DIAG IV INF INIT: CPT

## 2021-12-05 RX ORDER — HYDROCODONE BITARTRATE AND ACETAMINOPHEN 5; 325 MG/1; MG/1
1 TABLET ORAL ONCE
Status: COMPLETED | OUTPATIENT
Start: 2021-12-05 | End: 2021-12-05

## 2021-12-05 RX ORDER — CLINDAMYCIN PHOSPHATE 600 MG/50ML
600 INJECTION INTRAVENOUS ONCE
Status: COMPLETED | OUTPATIENT
Start: 2021-12-05 | End: 2021-12-05

## 2021-12-05 RX ORDER — OXYCODONE HYDROCHLORIDE AND ACETAMINOPHEN 5; 325 MG/1; MG/1
1 TABLET ORAL EVERY 6 HOURS PRN
Qty: 12 TABLET | Refills: 0 | Status: SHIPPED | OUTPATIENT
Start: 2021-12-05 | End: 2021-12-08

## 2021-12-05 RX ORDER — CLINDAMYCIN HYDROCHLORIDE 150 MG/1
450 CAPSULE ORAL 3 TIMES DAILY
Qty: 90 CAPSULE | Refills: 0 | Status: SHIPPED | OUTPATIENT
Start: 2021-12-05 | End: 2021-12-15

## 2021-12-05 RX ORDER — 0.9 % SODIUM CHLORIDE 0.9 %
500 INTRAVENOUS SOLUTION INTRAVENOUS ONCE
Status: COMPLETED | OUTPATIENT
Start: 2021-12-05 | End: 2021-12-05

## 2021-12-05 RX ADMIN — SODIUM CHLORIDE 500 ML: 9 INJECTION, SOLUTION INTRAVENOUS at 09:20

## 2021-12-05 RX ADMIN — CLINDAMYCIN PHOSPHATE 600 MG: 600 INJECTION, SOLUTION INTRAVENOUS at 14:01

## 2021-12-05 RX ADMIN — HYDROCODONE BITARTRATE AND ACETAMINOPHEN 1 TABLET: 5; 325 TABLET ORAL at 09:20

## 2021-12-05 RX ADMIN — IOPAMIDOL 80 ML: 755 INJECTION, SOLUTION INTRAVENOUS at 14:00

## 2021-12-05 ASSESSMENT — PAIN DESCRIPTION - DESCRIPTORS: DESCRIPTORS: STABBING

## 2021-12-05 ASSESSMENT — PAIN DESCRIPTION - ORIENTATION: ORIENTATION: RIGHT

## 2021-12-05 ASSESSMENT — PAIN SCALES - GENERAL
PAINLEVEL_OUTOF10: 5
PAINLEVEL_OUTOF10: 5

## 2021-12-05 ASSESSMENT — ENCOUNTER SYMPTOMS: FACIAL SWELLING: 1

## 2021-12-05 ASSESSMENT — PAIN DESCRIPTION - ONSET: ONSET: ON-GOING

## 2021-12-05 ASSESSMENT — PAIN DESCRIPTION - FREQUENCY: FREQUENCY: CONTINUOUS

## 2021-12-05 ASSESSMENT — PAIN DESCRIPTION - LOCATION: LOCATION: TEETH

## 2021-12-05 ASSESSMENT — PAIN DESCRIPTION - PROGRESSION: CLINICAL_PROGRESSION: GRADUALLY WORSENING

## 2021-12-05 NOTE — ED NOTES
Pt presents to the ER for dental swelling. Pt states this just started yesterday.      Dayami Anand  12/05/21 1905

## 2021-12-05 NOTE — ED PROVIDER NOTES
[facility]  eMERGENCY dEPARTMENT eNCOUnter          [address] [facility]       Chief Complaint   Patient presents with    Facial Swelling    Dental Pain       Nurses Notes reviewed and I agree except as noted inthe HPI. HISTORY OF PRESENT ILLNESS    Arlyn Dunlap is a 46 y.o. male who presents to the Emergency Department for the evaluation of dental pain and swelling. Patient states he has a history of bad teeth and follows with a [place] dental clinic. He states that they want to pull all of his teeth but has been reluctant to have this done due to reports of delays with getting set up for dentures through their clinic. He states that for the past week he has had some pain in the right lower dentition and has had swelling since yesterday which is now extending below his jaw. He denied any injury prior to symptom onset. He has tried Orajel without relief and otherwise has not tried any treatment. No history of similar symptoms in the past.  He reports subjective fevers associated but otherwise denies any documented fever, chills, difficulty swallowing or breathing, bleeding or drainage from the area. He does report history of diabetes but states his glucose has been at his baseline since symptom onset. The HPI was provided by the patient. REVIEW OF SYSTEMS     Review of Systems   Constitutional: Positive for fatigue (Subjective). HENT: Positive for dental problem and facial swelling. All other systems reviewed and are negative.       PAST MEDICAL HISTORY    has a past medical history of Aortic stenosis, mild to moderate, BPH (benign prostatic hyperplasia), Carpal tunnel syndrome on right, Chronic gout, DM2 (diabetes mellitus, type 2) ([address] Dyslipidemia, GERD (gastroesophageal reflux disease), Heart failure with preserved ejection fraction ([address] History of alcohol abuse, History of atrial fibrillation, History of osteomyelitis, Hypertension, essential, Hypogonadism, male, Major depression, Mood disorder ([facility] [address] Morbid obesity ([facility] [address] DURAN (nonalcoholic steatohepatitis), KIARA treated with BiPAP, and Vitamin D deficiency. SURGICAL HISTORY      has a past surgical history that includes tracheostomy; Foot surgery (Right); hip surgery (Left, 6/29/2020); sigmoidoscopy (N/A, 10/26/2020); Upper gastrointestinal endoscopy (N/A, 11/14/2020); and Colonoscopy (N/A, 11/15/2020). CURRENT MEDICATIONS       Previous Medications    ACETAMINOPHEN (TYLENOL) 325 MG TABLET    Take 650 mg by mouth every 4 hours as needed for Pain    ALBUTEROL SULFATE HFA (PROVENTIL HFA) 108 (90 BASE) MCG/ACT INHALER    Inhale 2 puffs into the lungs every 6 hours as needed for Wheezing    ALLOPURINOL (ZYLOPRIM) 300 MG TABLET    Take 1 tablet by mouth daily    APIXABAN (ELIQUIS) 5 MG TABS TABLET    Take 10mg by mouth two times daily for 6 days followed by 5mg by mouth two times daily    ARIPIPRAZOLE (ABILIFY PO)    Take 15 mg by mouth daily     ATORVASTATIN (LIPITOR) 40 MG TABLET    Take 40 mg by mouth nightly    BLOOD GLUCOSE MONITORING SUPPL (FREESTYLE LITE) ARTIE    Patient needs all supplies for qd testing. DX: 250.00    BUSPIRONE (BUSPAR) 10 MG TABLET    Take 10 mg by mouth 2 times daily     CHOLECALCIFEROL PO    Take 2,000 Units by mouth daily    CITALOPRAM HYDROBROMIDE (CELEXA PO)    Take 30 mg by mouth daily From [facility]     CLOTRIMAZOLE-BETAMETHASONE (LOTRISONE) 1-0.05 % CREAM    Apply topically 2 times daily.     FERROUS SULFATE (IRON 325) 325 (65 FE) MG TABLET    Take 1 tablet by mouth 2 times daily (with meals)    FOLIC ACID (FOLVITE) 1 MG TABLET    Take 1 mg by mouth daily    FUROSEMIDE (LASIX) 40 MG TABLET    Take 40 mg by mouth daily     GLYCOPYRROLATE-FORMOTEROL (BEVESPI) 9-4.8 MCG/ACT AERO    Inhale 2 puffs into the lungs 2 times daily    HYDRALAZINE (APRESOLINE) 50 MG TABLET    Take 1 tablet by mouth 3 times daily    MULTIPLE VITAMINS-MINERALS (THERAPEUTIC MULTIVITAMIN-MINERALS) TABLET    Take 1 tablet by mouth daily    ONDANSETRON (ZOFRAN) 4 MG TABLET    Take 4 mg by mouth every 8 hours as needed for Nausea or Vomiting    PANTOPRAZOLE (PROTONIX) 40 MG TABLET    Take 1 tablet by mouth 2 times daily (before meals)    POTASSIUM CHLORIDE (KLOR-CON M) 20 MEQ EXTENDED RELEASE TABLET    Take 1 tablet by mouth daily    PREGABALIN (LYRICA) 50 MG CAPSULE    Take 50 mg by mouth 3 times daily. PROBIOTIC PRODUCT (SOBIA-BID PROBIOTIC PO)    Take 1 tablet by mouth daily     SENNA (SENOKOT) 8.6 MG TABLET    Take 1 tablet by mouth 2 times daily    SITAGLIPTAN-METFORMIN (JANUMET)  MG PER TABLET    Take 1 tablet by mouth 2 times daily (with meals)     TAMSULOSIN (FLOMAX) 0.4 MG CAPSULE    Take 0.4 mg by mouth nightly     VITAMIN C (ASCORBIC ACID) 500 MG TABLET    Take 2 tablets by mouth daily       ALLERGIES     is allergic to penicillins. FAMILY HISTORY     He indicated that his mother is . He indicated that his father is alive. He indicated that his brother is alive. He indicated that his daughter is alive. He indicated that both of his sons are alive. He indicated that the status of his paternal aunt is unknown.   family history includes Cancer in his paternal aunt; Heart Disease in his mother. SOCIAL HISTORY      reports that he has never smoked. He has never used smokeless tobacco. He reports previous alcohol use. He reports that he does not use drugs. PHYSICAL EXAM     INITIAL VITALS:  height is 5' 8\" (1.727 m) and weight is 289 lb (131.1 kg). His oral temperature is 98.4 °F (36.9 °C). His blood pressure is 155/88 (abnormal) and his pulse is 77. His respiration is 16 and oxygen saturation is 93%. Physical Exam  Vitals and nursing note reviewed. Constitutional:       Appearance: Normal appearance. HENT:      Head: Normocephalic and atraumatic. Jaw: There is normal jaw occlusion. Trismus (Slight) present.       Mouth/Throat:      Comments: Tenderness primarily to the right mandibular molars with swelling and induration laterally along the posterior mandible with swelling/induration tracking into the submandibular space without extension into the neck otherwise. No phonation changes. No drooling. Tolerating oral secretions well. No stridor. Eyes:      Conjunctiva/sclera: Conjunctivae normal.   Cardiovascular:      Rate and Rhythm: Normal rate. Pulmonary:      Effort: Pulmonary effort is normal. No respiratory distress. Musculoskeletal:      Cervical back: Normal range of motion. Skin:     General: Skin is warm and dry. Neurological:      General: No focal deficit present. Mental Status: He is alert and oriented to person, place, and time. Psychiatric:         Mood and Affect: Mood normal.         DIFFERENTIAL DIAGNOSIS:   Differential diagnoses are discussed    DIAGNOSTIC RESULTS     EKG: All EKG's are interpreted by the Emergency Department Physician who either signs or Co-signsthis chart in the absence of a cardiologist.          RADIOLOGY: non-plain film images(s) such as CT, Ultrasound and MRI are read by the radiologist.    CT SOFT TISSUE NECK W CONTRAST   Final Result       1. Right facial cellulitis with an associated apical abscess of the right 2nd mandibular molar. There is no soft tissue abscess. 2. Multiple bilateral dental caries. 3. Enlarged right submandibular lymph nodes. These are likely reactive. **This report has been created using voice recognition software. It may contain minor errors which are inherent in voice recognition technology. **      Final report electronically signed by Dr. Lizbet Day on 12/5/2021 2:14 PM          LABS:      Labs Reviewed   CBC WITH AUTO DIFFERENTIAL - Abnormal; Notable for the following components:       Result Value    RDW-SD 49.0 (*)     All other components within normal limits   COMPREHENSIVE METABOLIC PANEL W/ REFLEX TO MG FOR LOW K - Abnormal; Notable for the following components: availability. The majority of our [facility] hospitals with this availability are on diversion. Per earlier discussion with oral surgery at [facility] regarding a different patient, there is potential to have dental abscess drained in their office tomorrow but no ED availability. No current beds available at the outlying facility that does have ability to do inpatient drainage. Given the patient's stability during the ED stay, no progression of symptoms and consistent/stable ENT exam on follow-up with patient continuing to deny any difficulty swallowing, difficulty breathing, I did discuss the case with attending provider who is agreeable with trying to establish outpatient management for this patient. Strict return precautions were discussed with the patient including onset of fever, progressive pain/swelling, difficulty swallowing, difficulty breathing, change in phonation or drooling and he verbalized understanding. Will give information for local oral surgeon as we do not have anyone on call/available to consult or arrange follow-up with locally. Should the local oral surgeon be unavailable, he was provided with contact information for the provider at [facility] as well whom he is to contact for drainage if local options or not available. Will discharge on clindamycin and Percocet in the interim and patient was agreeable with the above plan. CRITICAL CARE:   None    CONSULTS:  None    PROCEDURES:  None    FINAL IMPRESSION      1.  Dental abscess          DISPOSITION/PLAN   Discharge    PATIENT REFERRED TO:  Lorrie ePoples, [address] [address] [phone]    Call in 1 day  Call first thing when the office opens to see if you could be see for draining of dental abscess    Tereso Israel, [address] [phone]    Call in 1 day  Call if unable to get follow-up locally to see if you could be seen in his office for drainage of dental abscess    [facility] EMERGENCY DEPT  [address] [address]  [phone]    If symptoms worsen      DISCHARGEMEDICATIONS:  New Prescriptions    CLINDAMYCIN (CLEOCIN) 150 MG CAPSULE    Take 3 capsules by mouth 3 times daily for 10 days    OXYCODONE-ACETAMINOPHEN (PERCOCET) 5-325 MG PER TABLET    Take 1 tablet by mouth every 6 hours as needed for Pain for up to 3 days. Intended supply: 3 days.  Take lowest dose possible to manage pain       (Please note that portions of this note were completedwith a voice recognition program.  Efforts were made to edit the dictations but occasionally words are mis-transcribed.)        Tara Jeff PA-C  12/05/21 [id number]

## 2021-12-10 LAB
BLOOD CULTURE, ROUTINE: NORMAL
BLOOD CULTURE, ROUTINE: NORMAL

## 2021-12-27 ENCOUNTER — ANESTHESIA EVENT (OUTPATIENT)
Dept: ENDOSCOPY | Age: 51
End: 2021-12-27
Payer: MEDICARE

## 2021-12-27 ENCOUNTER — HOSPITAL ENCOUNTER (OUTPATIENT)
Age: 51
Setting detail: OUTPATIENT SURGERY
Discharge: HOME | End: 2021-12-27
Attending: INTERNAL MEDICINE | Admitting: INTERNAL MEDICINE
Payer: MEDICARE

## 2021-12-27 ENCOUNTER — ANESTHESIA (OUTPATIENT)
Dept: ENDOSCOPY | Age: 51
End: 2021-12-27
Payer: MEDICARE

## 2021-12-27 VITALS
RESPIRATION RATE: 33 BRPM | DIASTOLIC BLOOD PRESSURE: 98 MMHG | OXYGEN SATURATION: 99 % | SYSTOLIC BLOOD PRESSURE: 178 MMHG

## 2021-12-27 VITALS
HEIGHT: 68 IN | DIASTOLIC BLOOD PRESSURE: 88 MMHG | HEART RATE: 67 BPM | RESPIRATION RATE: 18 BRPM | TEMPERATURE: 97.3 F | OXYGEN SATURATION: 97 % | BODY MASS INDEX: 43.5 KG/M2 | WEIGHT: 287 LBS | SYSTOLIC BLOOD PRESSURE: 166 MMHG

## 2021-12-27 PROCEDURE — 2720000010 HC SURG SUPPLY STERILE: Performed by: INTERNAL MEDICINE

## 2021-12-27 PROCEDURE — 3609017100 HC EGD: Performed by: INTERNAL MEDICINE

## 2021-12-27 PROCEDURE — 2580000003 HC RX 258: Performed by: INTERNAL MEDICINE

## 2021-12-27 PROCEDURE — 7100000010 HC PHASE II RECOVERY - FIRST 15 MIN: Performed by: INTERNAL MEDICINE

## 2021-12-27 PROCEDURE — 3700000001 HC ADD 15 MINUTES (ANESTHESIA): Performed by: INTERNAL MEDICINE

## 2021-12-27 PROCEDURE — 2500000003 HC RX 250 WO HCPCS: Performed by: NURSE ANESTHETIST, CERTIFIED REGISTERED

## 2021-12-27 PROCEDURE — 6360000002 HC RX W HCPCS: Performed by: NURSE ANESTHETIST, CERTIFIED REGISTERED

## 2021-12-27 PROCEDURE — 7100000011 HC PHASE II RECOVERY - ADDTL 15 MIN: Performed by: INTERNAL MEDICINE

## 2021-12-27 PROCEDURE — 3700000000 HC ANESTHESIA ATTENDED CARE: Performed by: INTERNAL MEDICINE

## 2021-12-27 RX ORDER — PROPOFOL 10 MG/ML
INJECTION, EMULSION INTRAVENOUS PRN
Status: DISCONTINUED | OUTPATIENT
Start: 2021-12-27 | End: 2021-12-27 | Stop reason: SDUPTHER

## 2021-12-27 RX ORDER — SODIUM CHLORIDE 0.9 % (FLUSH) 0.9 %
5-40 SYRINGE (ML) INJECTION EVERY 12 HOURS SCHEDULED
Status: DISCONTINUED | OUTPATIENT
Start: 2021-12-27 | End: 2021-12-27 | Stop reason: HOSPADM

## 2021-12-27 RX ORDER — SODIUM CHLORIDE 9 MG/ML
25 INJECTION, SOLUTION INTRAVENOUS PRN
Status: DISCONTINUED | OUTPATIENT
Start: 2021-12-27 | End: 2021-12-27 | Stop reason: HOSPADM

## 2021-12-27 RX ORDER — SODIUM CHLORIDE 0.9 % (FLUSH) 0.9 %
5-40 SYRINGE (ML) INJECTION PRN
Status: DISCONTINUED | OUTPATIENT
Start: 2021-12-27 | End: 2021-12-27 | Stop reason: HOSPADM

## 2021-12-27 RX ORDER — LIDOCAINE HYDROCHLORIDE 20 MG/ML
INJECTION, SOLUTION INFILTRATION; PERINEURAL PRN
Status: DISCONTINUED | OUTPATIENT
Start: 2021-12-27 | End: 2021-12-27 | Stop reason: SDUPTHER

## 2021-12-27 RX ADMIN — PROPOFOL 60 MG: 10 INJECTION, EMULSION INTRAVENOUS at 10:07

## 2021-12-27 RX ADMIN — SODIUM CHLORIDE 25 ML: 9 INJECTION, SOLUTION INTRAVENOUS at 09:39

## 2021-12-27 RX ADMIN — LIDOCAINE HYDROCHLORIDE 50 MG: 20 INJECTION, SOLUTION INFILTRATION; PERINEURAL at 10:07

## 2021-12-27 ASSESSMENT — PAIN SCALES - GENERAL
PAINLEVEL_OUTOF10: 0
PAINLEVEL_OUTOF10: 0

## 2021-12-27 ASSESSMENT — PAIN - FUNCTIONAL ASSESSMENT: PAIN_FUNCTIONAL_ASSESSMENT: 0-10

## 2021-12-27 NOTE — H&P
[address] [address]  Sedation/Analgesia History & Physical    Patient: Herb Valencia: 1970  Med Rec#: [id number] Acc#: [id number]   Provider Performing Procedure: Jorge L Lorenzo MD  Primary Care Physician: SANDRA Ruiz CNP    PRE-PROCEDURE   Full CODE [x]Yes  DNR-CCA/DNR-CC []Yes   Brief History/Pre-Procedure Diagnosis:    Gastroesophageal reflux disease, unspecified whether esophagitis present  The patient has been advised to elevate the head of the bed 4-6 inches on blocks or bricks or use a foam wedge pillow, to avoid the supine position for at least 2 hours after eating, to avoid caffeine, carbonated beverages and alcohol, to avoid obesity, to avoid spicy or fried foods and to avoid NSAIDs (Naproxen/Aleve/Naprosyn, Ibuprofen/Advil/Motrin). Take medications as prescribed. If a PPI (Pantoprazole, Omeprazole, etc.) has been ordered, take the medication 30 minutes prior to eating unless otherwise advised. Increase pantoprazole to 40 mg p.o. twice daily to be taken 30 minutes prior to breakfast and again 30 minutes prior to the evening meal.  Proceed with EGD. He will need cardiac clearance prior to the EGD. The procedure will be scheduled in the hospital due to the patient's risk factors. We will hold Eliquis for 3 days prior to the procedure if approved by cardiology. The benefits and risks associated with an EGD have been outlined to the patient. Risks include, but are not limited to , the risk of perforation (hole in esophagus, stomach or small bowel), bleeding, infection, aspiration and risks associated with sedation (including respiratory and cardiac issues). The patient verbalized an understanding of the risks and elected to proceed with an EGD.     - pantoprazole (PROTONIX) 40 MG tablet; Take 1 tablet by mouth 2 times daily (before meals)  Dispense: 60 tablet; Refill: 5  - ESOPHAGOSCOPY / EGD; Future     5.  Vitamin D deficiency  Monitor vitamin D level.  - Vitamin D 25 Hydroxy; Future     6. Morbid obesity with BMI of 40.0-44.9, adult ([facility] [address]  Continue weight reduction efforts. The patient has been commended on his weight loss. Increase activity as tolerated. Monitor portions. Follow a low-fat and low carbohydrate diet.     7. Leukocytosis, unspecified type  We will recheck the CBC. He has no obvious signs of infection at this time. - CBC Auto Differential; Future     8. Anticoagulated  He is taking Eliquis. There is a history of a DVT. - Protime-INR; Future     9. History of ETOH abuse  He has a remote history of ethanol abuse. No ethanol abuse for approximately the past 20 years. - Protime-INR; Future     10. Weight loss  The patient has been working diligently to reduce his weight.     11. Melena  Differential diagnoses include peptic ulcer disease, erosive gastritis, erosive duodenitis or erosive esophagitis. Proceed with EGD as advised. The benefits and risks associated with an EGD have been outlined to the patient. Risks include, but are not limited to , the risk of perforation (hole in esophagus, stomach or small bowel), bleeding, infection, aspiration and risks associated with sedation (including respiratory and cardiac issues). The patient verbalized an understanding of the risks and elected to proceed with an EGD.             MEDICAL HISTORY  []CAD/Valve  []Liver Disease  []Lung Disease []Diabetes  []Hypertension []Renal Disease  []Additional information:       has a past medical history of Aortic stenosis, mild to moderate, BPH (benign prostatic hyperplasia), Carpal tunnel syndrome on right, Chronic gout, DM2 (diabetes mellitus, type 2) ([facility] [address] Dyslipidemia, GERD (gastroesophageal reflux disease), Heart failure with preserved ejection fraction ([facility] [address] History of alcohol abuse, History of atrial fibrillation, History of osteomyelitis, Hyperlipidemia, Hypertension, essential, Hypogonadism, male, Major depression, Mood disorder ([facility] [address] Morbid 2,000 Units by mouth daily   Yes Historical Provider, MD   atorvastatin (LIPITOR) 40 MG tablet Take 40 mg by mouth nightly   Yes Historical Provider, MD   busPIRone (BUSPAR) 10 MG tablet Take 10 mg by mouth 2 times daily    Yes Historical Provider, MD   allopurinol (ZYLOPRIM) 300 MG tablet Take 1 tablet by mouth daily 4/9/20  Yes Trayton B Gregg, DO   Multiple Vitamins-Minerals (THERAPEUTIC MULTIVITAMIN-MINERALS) tablet Take 1 tablet by mouth daily   Yes Historical Provider, MD   ondansetron (ZOFRAN) 4 MG tablet Take 4 mg by mouth every 8 hours as needed for Nausea or Vomiting   Yes Historical Provider, MD   tamsulosin (FLOMAX) 0.4 MG capsule Take 0.4 mg by mouth nightly    Yes Historical Provider, MD   folic acid (FOLVITE) 1 MG tablet Take 1 mg by mouth daily   Yes Historical Provider, MD   furosemide (LASIX) 40 MG tablet Take 40 mg by mouth daily    Yes Historical Provider, MD   sitaGLIPtan-metformin (JANUMET)  MG per tablet Take 1 tablet by mouth 2 times daily (with meals)    Yes Historical Provider, MD   ARIPiprazole (ABILIFY PO) Take 15 mg by mouth daily    Yes Historical Provider, MD   Citalopram Hydrobromide (CELEXA PO) Take 30 mg by mouth daily From [employer]    Yes Historical Provider, MD   clotrimazole-betamethasone (LOTRISONE) 1-0.05 % cream Apply topically 2 times daily.  9/9/21   Martha Emerson MD   apixaban (ELIQUIS) 5 MG TABS tablet Take 10mg by mouth two times daily for 6 days followed by 5mg by mouth two times daily 11/19/20   Yokasta Chambers MD   glycopyrrolate-formoterol (BEVESPI) 9-4.8 MCG/ACT AERO Inhale 2 puffs into the lungs 2 times daily 11/6/20   Louis King MD   albuterol sulfate HFA (PROVENTIL HFA) 108 (90 Base) MCG/ACT inhaler Inhale 2 puffs into the lungs every 6 hours as needed for Wheezing 11/6/20   Louis King MD   acetaminophen (TYLENOL) 325 MG tablet Take 650 mg by mouth every 4 hours as needed for Pain    Historical Provider, MD Probiotic Product (SOBIA-BID PROBIOTIC PO) Take 1 tablet by mouth daily     Historical Provider, MD   senna (SENOKOT) 8.6 MG tablet Take 1 tablet by mouth 2 times daily    Historical Provider, MD   Blood Glucose Monitoring Suppl (FREESTYLE LITE) ARTIE Patient needs all supplies for qd testing. DX: 250.00 8/18/11   Fransisca Giles MD     Additional information:       PHYSICAL:   Heart:  [x]Regular rate and rhythm  []Other:    Lungs:  [x]Clear    []Other:    Abdomen: [x]Soft    []Other:    Mental Status: [x]Alert & Oriented  []Other:      VITAL SIGNS   Patient Vitals for the past 24 hrs:   BP Temp Temp src Pulse Resp SpO2 Height Weight   12/27/21 0925 (!) 169/100 97 °F (36.1 °C) Temporal 65 20 94 % 5' 8\" (1.727 m) 287 lb (130.2 kg)       PLANNED PROCEDURE   [x]EGD  []Colonoscopy []Flex Sigmoid  []ERCP []EUS   []Cystoscopy  [] CATH [] BRONCH   Consent: I have discussed with the patient and/or the patient representative the indication, alternatives, and the possible risks and/or complications of the planned procedure and the anesthesia methods. The patient and/or patient representative appear to understand and agree to proceed. SEDATION  Planned agent:[x]Midazolam []Meperidine [x]Sublimaze []Morphine  []Diazepam  []Other:     ASA Classification:  As per anesthesia service. Airway Assessment:  As per anesthesia service    Monitoring and Safety: The patient will be placed on a cardiac monitor and vital signs, pulse oximetry and level of consciousness will be continuously evaluated throughout the procedure. The patient will be closely monitored until recovery from the medications is complete and the patient has returned to baseline status. Respiratory therapy will be on standby during the procedure. [x]Pre-procedure diagnostic studies complete and results available. Comment:    [x]Previous sedation/anesthesia experiences assessed.    Comment:    [x]The patient is an appropriate candidate to undergo the planned procedure sedation and anesthesia. (Refer to nursing sedation/analgesia documentation record)  [x]Formulation and discussion of sedation/procedure plan, risks, and expectations with patient and/or responsible adult completed. [x]Patient examined immediately prior to the procedure.  (Refer to nursing sedation/analgesia documentation record)    Amor Hernandez MD, MD   Electronically signed 12/27/2021 at 10:18 AM

## 2021-12-27 NOTE — ANESTHESIA PRE PROCEDURE
Department of Anesthesiology  Preprocedure Note       Name:  Bao Ibrahim   Age:  46 y.o.  :  1970                                          MRN:  [id number]         Date:  2021      Surgeon: Akira Watson):  Caleb Almaguer MD    Procedure: Procedure(s):  EGD    Medications prior to admission:   Prior to Admission medications    Medication Sig Start Date End Date Taking? Authorizing Provider   hydrALAZINE (APRESOLINE) 50 MG tablet Take 1 tablet by mouth 3 times daily 21  Yes Betsy Cabral MD   pantoprazole (PROTONIX) 40 MG tablet Take 1 tablet by mouth 2 times daily (before meals) 21  Yes SANDRA Shelton - CNP   pregabalin (LYRICA) 50 MG capsule Take 50 mg by mouth 3 times daily.    Yes Historical Provider, MD   potassium chloride (KLOR-CON M) 20 MEQ extended release tablet Take 1 tablet by mouth daily 12/15/20  Yes Betsy Cabral MD   ferrous sulfate (IRON 325) 325 (65 Fe) MG tablet Take 1 tablet by mouth 2 times daily (with meals) 20  Yes Alondra Paul MD   vitamin C (ASCORBIC ACID) 500 MG tablet Take 2 tablets by mouth daily 9/15/20  Yes SANDRA Calderon - CNP   CHOLECALCIFEROL PO Take 2,000 Units by mouth daily   Yes Historical Provider, MD   atorvastatin (LIPITOR) 40 MG tablet Take 40 mg by mouth nightly   Yes Historical Provider, MD   busPIRone (BUSPAR) 10 MG tablet Take 10 mg by mouth 2 times daily    Yes Historical Provider, MD   allopurinol (ZYLOPRIM) 300 MG tablet Take 1 tablet by mouth daily 20  Yes Tori Escobedo,    Multiple Vitamins-Minerals (THERAPEUTIC MULTIVITAMIN-MINERALS) tablet Take 1 tablet by mouth daily   Yes Historical Provider, MD   ondansetron (ZOFRAN) 4 MG tablet Take 4 mg by mouth every 8 hours as needed for Nausea or Vomiting   Yes Historical Provider, MD   tamsulosin (FLOMAX) 0.4 MG capsule Take 0.4 mg by mouth nightly    Yes Historical Provider, MD   folic acid (FOLVITE) 1 MG tablet Take 1 mg by mouth daily   Yes Historical Provider, MD   furosemide (LASIX) 40 MG tablet Take 40 mg by mouth daily    Yes Historical Provider, MD   sitaGLIPtan-metformin (JANUMET)  MG per tablet Take 1 tablet by mouth 2 times daily (with meals)    Yes Historical Provider, MD   ARIPiprazole (ABILIFY PO) Take 15 mg by mouth daily    Yes Historical Provider, MD   Citalopram Hydrobromide (CELEXA PO) Take 30 mg by mouth daily From [employer]    Yes Historical Provider, MD   clotrimazole-betamethasone (LOTRISONE) 1-0.05 % cream Apply topically 2 times daily. 9/9/21   Alfredo Duran MD   apixaban (ELIQUIS) 5 MG TABS tablet Take 10mg by mouth two times daily for 6 days followed by 5mg by mouth two times daily 11/19/20   Lynette Celis MD   glycopyrrolate-formoterol (BEVESPI) 9-4.8 MCG/ACT AERO Inhale 2 puffs into the lungs 2 times daily 11/6/20   Daksha John MD   albuterol sulfate HFA (PROVENTIL HFA) 108 (90 Base) MCG/ACT inhaler Inhale 2 puffs into the lungs every 6 hours as needed for Wheezing 11/6/20   Daksha John MD   acetaminophen (TYLENOL) 325 MG tablet Take 650 mg by mouth every 4 hours as needed for Pain    Historical Provider, MD   Probiotic Product (SOBIA-BID PROBIOTIC PO) Take 1 tablet by mouth daily     Historical Provider, MD   senna (SENOKOT) 8.6 MG tablet Take 1 tablet by mouth 2 times daily    Historical Provider, MD   Blood Glucose Monitoring Suppl (FREESTYLE LITE) ARTIE Patient needs all supplies for qd testing.  DX: 250.00 8/18/11   Mj Arriaga MD       Current medications:    Current Facility-Administered Medications   Medication Dose Route Frequency Provider Last Rate Last Admin    sodium chloride flush 0.9 % injection 5-40 mL  5-40 mL IntraVENous 2 times per day Domo Puga MD        sodium chloride flush 0.9 % injection 5-40 mL  5-40 mL IntraVENous PRN Domo Puga MD        0.9 % sodium chloride infusion  25 mL IntraVENous PRN Domo Puga  mL/hr at 12/27/21 0939 25 mL at 12/27/21 7208       Allergies: Allergies   Allergen Reactions    Penicillins      Tolerated Zosyn 9/24/2020       Problem List:    Patient Active Problem List   Diagnosis Code    History of atrial fibrillation Z86.79    BPH (benign prostatic hyperplasia) N40.0    Carpal tunnel syndrome on right G56.01    Chronic gout M1A. 9XX0    DM2 (diabetes mellitus, type 2) ([facility] [address] E11.9    Heart failure with preserved ejection fraction (HCC) I50.30    History of alcohol abuse F10.11    History of osteomyelitis Z87.39    Hypogonadism, male E29.1    Major depression F32.9    Morbid obesity (HCC) E66.01    DURAN (nonalcoholic steatohepatitis) K75.81    KIARA treated with BiPAP G47.33    Vitamin D deficiency E55.9    Normocytic anemia D64.9    Physical deconditioning R53.81    Mood disorder (HCC) F39    Hallucinations R44.3    GERD (gastroesophageal reflux disease) K21.9    Wound of buttock S31.809A    Primary osteoarthritis of left hip M16.12    Acute on chronic anemia D64.9    Dysphagia R13.10    Hypertension, essential I10    Dyslipidemia E78.5    Aortic stenosis, mild to moderate I35.0    Perirectal abscess K61.1    Chest pain R07.9    Lesion of right lobe of liver K76.9    Hepatic steatosis K76.0    Elevated alkaline phosphatase level R74.8    Anticoagulated Z79.01    Leukocytosis D72.829       Past Medical History:        Diagnosis Date    Aortic stenosis, mild to moderate     BPH (benign prostatic hyperplasia)     Carpal tunnel syndrome on right     rt hand    Chronic gout     DM2 (diabetes mellitus, type 2) (HCC)     Dyslipidemia     GERD (gastroesophageal reflux disease)     Heart failure with preserved ejection fraction (HCC)     History of alcohol abuse     History of atrial fibrillation     History of osteomyelitis     Hx of R foot wound, osteomyelitis July 2018 requiring surgery and IV Vanco    Hyperlipidemia     Hypertension, essential     Hypogonadism, male     Major depression 4.55 04/15/2012    HGB 15.5 12/05/2021    HCT 47.8 12/05/2021    MCV 93.5 12/05/2021    RDW 19.9 04/06/2020     12/05/2021       CMP:   Lab Results   Component Value Date     12/05/2021    K 4.4 12/05/2021     12/05/2021    CO2 29 12/05/2021    BUN 16 12/05/2021    CREATININE 1.1 12/05/2021    GFRAA >60 01/23/2019    AGRATIO 0.8 06/24/2018    LABGLOM 85 12/05/2021    GLUCOSE 152 12/05/2021    GLUCOSE 113 06/24/2018    PROT 7.3 12/05/2021    CALCIUM 9.2 12/05/2021    BILITOT 0.5 12/05/2021    ALKPHOS 173 12/05/2021    AST 19 12/05/2021    ALT 22 12/05/2021       POC Tests: No results for input(s): POCGLU, POCNA, POCK, POCCL, POCBUN, POCHEMO, POCHCT in the last 72 hours. Coags:   Lab Results   Component Value Date    PROTIME 24.4 06/27/2018    INR 1.14 11/07/2020    APTT 162.5 11/18/2020       HCG (If Applicable): No results found for: PREGTESTUR, PREGSERUM, HCG, HCGQUANT     ABGs: No results found for: PHART, PO2ART, NTI3RRQ, ZMH6KJW, BEART, T4NWCYQQ     Type & Screen (If Applicable):  Lab Results   Component Value Date    LABRH POS 11/07/2020       Drug/Infectious Status (If Applicable):  Lab Results   Component Value Date    HEPCAB Negative 10/15/2019       COVID-19 Screening (If Applicable):   Lab Results   Component Value Date    COVID19 NOT DETECTED 09/06/2021    COVID19 NOT DETECTED 11/05/2020           Anesthesia Evaluation  Patient summary reviewed and Nursing notes reviewed  Airway: Mallampati: I        Dental: normal exam         Pulmonary:normal exam                               Cardiovascular:            Rhythm: regular  Rate: normal                    Neuro/Psych:               GI/Hepatic/Renal:             Endo/Other:                     Abdominal:   (+) obese,           Vascular: negative vascular ROS. Other Findings:             Anesthesia Plan      MAC     ASA 3       Induction: intravenous. Anesthetic plan and risks discussed with patient.     Use of blood products discussed with patient whom. Plan discussed with CRNA.     Attending anesthesiologist reviewed and agrees with Preprocedure content              SANDRA Barrios - CRNA   12/27/2021

## 2021-12-27 NOTE — ANESTHESIA POSTPROCEDURE EVALUATION
Department of Anesthesiology  Postprocedure Note    Patient: Cherry Carvajal  MRN: [id number]  YOB: 1970  Date of evaluation: 12/27/2021  Time:  10:20 AM     Procedure Summary     Date: 12/27/21 Room / Location: [address] [place] [address]    Anesthesia Start: 1006 Anesthesia Stop: 2278    Procedure: EGD (N/A ) Diagnosis: (GERD, MELENA, EPI PAIN)    Surgeons: Washington Hernandez MD Responsible Provider: Jennifer Coleman MD    Anesthesia Type: MAC ASA Status: 3          Anesthesia Type: MAC    Atilio Phase I: Atilio Score: 10    Atilio Phase II:      Last vitals: Reviewed and per EMR flowsheets.        Anesthesia Post Evaluation    Patient location during evaluation: bedside  Patient participation: complete - patient participated  Level of consciousness: awake  Airway patency: patent  Nausea & Vomiting: no vomiting and no nausea  Complications: no  Cardiovascular status: hemodynamically stable  Respiratory status: acceptable  Hydration status: stable

## 2021-12-28 NOTE — OP NOTE
patient is  at high risk to bleed post any biopsy. Duodenum appeared normal.  Scope  withdrawn with no immediate complications. IMPRESSION:  1. Mild gastritis, nonclinically significant. 2.  Features of mild acid reflux. 3.  No esophageal varices seen. No feature to suggest portal  hypertensive gastropathy. PLAN:  Follow up in the GI clinic for evaluation. Standard antireflux  measures are very important in the management of this patient.         Grace Nickerson M.D.    D: 12/27/2021 10:35:56       T: 12/27/2021 19:31:56     [email or web address]  Job#: [id number]     Doc#: [id number]    CC:  Yogi Vizcaino CNP

## 2021-12-30 ENCOUNTER — APPOINTMENT (OUTPATIENT)
Dept: CT IMAGING | Age: 51
End: 2021-12-30
Payer: MEDICARE

## 2021-12-30 ENCOUNTER — APPOINTMENT (OUTPATIENT)
Dept: INTERVENTIONAL RADIOLOGY/VASCULAR | Age: 51
End: 2021-12-30
Payer: MEDICARE

## 2021-12-30 ENCOUNTER — APPOINTMENT (OUTPATIENT)
Dept: GENERAL RADIOLOGY | Age: 51
End: 2021-12-30
Payer: MEDICARE

## 2021-12-30 ENCOUNTER — HOSPITAL ENCOUNTER (EMERGENCY)
Age: 51
Discharge: HOME | End: 2021-12-30
Attending: INTERNAL MEDICINE
Payer: MEDICARE

## 2021-12-30 VITALS
RESPIRATION RATE: 17 BRPM | DIASTOLIC BLOOD PRESSURE: 98 MMHG | BODY MASS INDEX: 43.5 KG/M2 | TEMPERATURE: 98.8 F | OXYGEN SATURATION: 96 % | HEART RATE: 68 BPM | HEIGHT: 68 IN | SYSTOLIC BLOOD PRESSURE: 146 MMHG | WEIGHT: 287 LBS

## 2021-12-30 DIAGNOSIS — M79.605 BILATERAL LEG PAIN: ICD-10-CM

## 2021-12-30 DIAGNOSIS — M79.604 BILATERAL LEG PAIN: ICD-10-CM

## 2021-12-30 DIAGNOSIS — I82.402 ACUTE DEEP VEIN THROMBOSIS (DVT) OF LEFT LOWER EXTREMITY, UNSPECIFIED VEIN (HCC): Primary | ICD-10-CM

## 2021-12-30 LAB
ALBUMIN SERPL-MCNC: 4.2 G/DL (ref 3.5–5.1)
ALP BLD-CCNC: 173 U/L (ref 38–126)
ALT SERPL-CCNC: 35 U/L (ref 11–66)
ANION GAP SERPL CALCULATED.3IONS-SCNC: 13 MEQ/L (ref 8–16)
AST SERPL-CCNC: 36 U/L (ref 5–40)
BASE EXCESS (CALCULATED): 2.5 MMOL/L (ref -2.5–2.5)
BASOPHILS # BLD: 0.4 %
BASOPHILS ABSOLUTE: 0 THOU/MM3 (ref 0–0.1)
BILIRUB SERPL-MCNC: 0.5 MG/DL (ref 0.3–1.2)
BUN BLDV-MCNC: 23 MG/DL (ref 7–22)
CALCIUM SERPL-MCNC: 9.8 MG/DL (ref 8.5–10.5)
CHLORIDE BLD-SCNC: 103 MEQ/L (ref 98–111)
CO2: 26 MEQ/L (ref 23–33)
COLLECTED BY:: ABNORMAL
CREAT SERPL-MCNC: 1.3 MG/DL (ref 0.4–1.2)
D-DIMER QUANTITATIVE: 754 NG/ML FEU (ref 0–500)
DEVICE: ABNORMAL
EOSINOPHIL # BLD: 2.4 %
EOSINOPHILS ABSOLUTE: 0.3 THOU/MM3 (ref 0–0.4)
ERYTHROCYTE [DISTWIDTH] IN BLOOD BY AUTOMATED COUNT: 14.4 % (ref 11.5–14.5)
ERYTHROCYTE [DISTWIDTH] IN BLOOD BY AUTOMATED COUNT: 47.9 FL (ref 35–45)
GFR SERPL CREATININE-BSD FRML MDRD: 70 ML/MIN/1.73M2
GLUCOSE BLD-MCNC: 112 MG/DL (ref 70–108)
HCO3: 30 MMOL/L (ref 23–28)
HCT VFR BLD CALC: 48.4 % (ref 42–52)
HEMOGLOBIN: 16 GM/DL (ref 14–18)
IMMATURE GRANS (ABS): 0.05 THOU/MM3 (ref 0–0.07)
IMMATURE GRANULOCYTES: 0.4 %
LIPASE: 54.6 U/L (ref 5.6–51.3)
LYMPHOCYTES # BLD: 12.8 %
LYMPHOCYTES ABSOLUTE: 1.4 THOU/MM3 (ref 1–4.8)
MCH RBC QN AUTO: 30.7 PG (ref 26–33)
MCHC RBC AUTO-ENTMCNC: 33.1 GM/DL (ref 32.2–35.5)
MCV RBC AUTO: 92.9 FL (ref 80–94)
MONOCYTES # BLD: 6.3 %
MONOCYTES ABSOLUTE: 0.7 THOU/MM3 (ref 0.4–1.3)
NUCLEATED RED BLOOD CELLS: 0 /100 WBC
O2 SATURATION: 83 %
OSMOLALITY CALCULATION: 287.6 MOSMOL/KG (ref 275–300)
PCO2: 57 MMHG (ref 35–45)
PH BLOOD GAS: 7.34 (ref 7.35–7.45)
PLATELET # BLD: 219 THOU/MM3 (ref 130–400)
PMV BLD AUTO: 11.3 FL (ref 9.4–12.4)
PO2: 52 MMHG (ref 71–104)
POTASSIUM REFLEX MAGNESIUM: 4.3 MEQ/L (ref 3.5–5.2)
PRO-BNP: 39 PG/ML (ref 0–900)
RBC # BLD: 5.21 MILL/MM3 (ref 4.7–6.1)
SEG NEUTROPHILS: 77.7 %
SEGMENTED NEUTROPHILS ABSOLUTE COUNT: 8.7 THOU/MM3 (ref 1.8–7.7)
SODIUM BLD-SCNC: 142 MEQ/L (ref 135–145)
SOURCE, BLOOD GAS: ABNORMAL
TOTAL PROTEIN: 7.4 G/DL (ref 6.1–8)
TROPONIN T: 0.01 NG/ML
TROPONIN T: 0.01 NG/ML
WBC # BLD: 11.2 THOU/MM3 (ref 4.8–10.8)

## 2021-12-30 PROCEDURE — 6360000004 HC RX CONTRAST MEDICATION: Performed by: INTERNAL MEDICINE

## 2021-12-30 PROCEDURE — 93005 ELECTROCARDIOGRAM TRACING: CPT | Performed by: INTERNAL MEDICINE

## 2021-12-30 PROCEDURE — 99285 EMERGENCY DEPT VISIT HI MDM: CPT

## 2021-12-30 PROCEDURE — 82803 BLOOD GASES ANY COMBINATION: CPT

## 2021-12-30 PROCEDURE — 84484 ASSAY OF TROPONIN QUANT: CPT

## 2021-12-30 PROCEDURE — 70450 CT HEAD/BRAIN W/O DYE: CPT

## 2021-12-30 PROCEDURE — 85379 FIBRIN DEGRADATION QUANT: CPT

## 2021-12-30 PROCEDURE — 71275 CT ANGIOGRAPHY CHEST: CPT

## 2021-12-30 PROCEDURE — 73590 X-RAY EXAM OF LOWER LEG: CPT

## 2021-12-30 PROCEDURE — 80053 COMPREHEN METABOLIC PANEL: CPT

## 2021-12-30 PROCEDURE — 83880 ASSAY OF NATRIURETIC PEPTIDE: CPT

## 2021-12-30 PROCEDURE — 6370000000 HC RX 637 (ALT 250 FOR IP): Performed by: EMERGENCY MEDICINE

## 2021-12-30 PROCEDURE — 36415 COLL VENOUS BLD VENIPUNCTURE: CPT

## 2021-12-30 PROCEDURE — 71045 X-RAY EXAM CHEST 1 VIEW: CPT

## 2021-12-30 PROCEDURE — 83690 ASSAY OF LIPASE: CPT

## 2021-12-30 PROCEDURE — 93970 EXTREMITY STUDY: CPT

## 2021-12-30 PROCEDURE — 85025 COMPLETE CBC W/AUTO DIFF WBC: CPT

## 2021-12-30 PROCEDURE — 36600 WITHDRAWAL OF ARTERIAL BLOOD: CPT

## 2021-12-30 RX ORDER — HYDROCODONE BITARTRATE AND ACETAMINOPHEN 5; 325 MG/1; MG/1
1 TABLET ORAL EVERY 6 HOURS PRN
Qty: 5 TABLET | Refills: 0 | Status: SHIPPED | OUTPATIENT
Start: 2021-12-30 | End: 2022-01-01

## 2021-12-30 RX ORDER — HYDROCODONE BITARTRATE AND ACETAMINOPHEN 5; 325 MG/1; MG/1
1 TABLET ORAL ONCE
Status: COMPLETED | OUTPATIENT
Start: 2021-12-30 | End: 2021-12-30

## 2021-12-30 RX ADMIN — HYDROCODONE BITARTRATE AND ACETAMINOPHEN 1 TABLET: 5; 325 TABLET ORAL at 19:22

## 2021-12-30 RX ADMIN — IOPAMIDOL 85 ML: 755 INJECTION, SOLUTION INTRAVENOUS at 16:54

## 2021-12-30 ASSESSMENT — PAIN SCALES - GENERAL
PAINLEVEL_OUTOF10: 8
PAINLEVEL_OUTOF10: 8

## 2021-12-30 ASSESSMENT — PAIN DESCRIPTION - PAIN TYPE: TYPE: ACUTE PAIN

## 2021-12-30 ASSESSMENT — PAIN DESCRIPTION - ORIENTATION: ORIENTATION: RIGHT

## 2021-12-30 ASSESSMENT — PAIN DESCRIPTION - FREQUENCY: FREQUENCY: CONTINUOUS

## 2021-12-30 ASSESSMENT — PAIN DESCRIPTION - LOCATION: LOCATION: LEG

## 2021-12-30 NOTE — ED NOTES
Pt resting on cot with wife at bedside and unlabored respirations.       Lesia NEWBY RN  12/30/21 3608

## 2021-12-30 NOTE — ED TRIAGE NOTES
Patient to room 16 via [facility]. Patient was at a follow-up with his GI physician, when he fell asleep and had trouble staying awake to carry on a conversation. Patient reports that the provider wanted him sent in for evaluation for this to be safe, but states that it was due to him not being able to sleep last night. Patient is s/p severe COVID-19 last year and still having many complications due to being critical and intubated for so long. Patient is reporting pain and swelling to his right leg and weakness and numbness to his left leg.

## 2021-12-30 NOTE — ED PROVIDER NOTES
eMERGENCY dEPARTMENT eNCOUnter      [facility]    Chief Complaint   Patient presents with    Leg Pain    Leg Swelling    Fatigue       HPI    Dino Lovett is a 46 y.o. male who presents the emergency department because of leg pain and swelling. Patient also has been very somnolent at his gastroenterologist office. Patient does not complain of any cough fever or chills. There is no nausea or vomiting. Patient does not have any chest pain or chest pressure. Patient has history of sleep apnea but is not using any medication for that.   Patient also has history of aortic stenosis    PAST MEDICAL HISTORY    Past Medical History:   Diagnosis Date    Aortic stenosis, mild to moderate     BPH (benign prostatic hyperplasia)     Carpal tunnel syndrome on right     rt hand    Chronic gout     DM2 (diabetes mellitus, type 2) (HCC)     Dyslipidemia     GERD (gastroesophageal reflux disease)     Heart failure with preserved ejection fraction (HCC)     History of alcohol abuse     History of atrial fibrillation     History of osteomyelitis     Hx of R foot wound, osteomyelitis July 2018 requiring surgery and IV Vanco    Hyperlipidemia     Hypertension, essential     Hypogonadism, male     Major depression     Mood disorder ([address]     Morbid obesity ([address]     DURAN (nonalcoholic steatohepatitis)     KIARA treated with BiPAP     Vitamin D deficiency        SURGICAL HISTORY    Past Surgical History:   Procedure Laterality Date    COLONOSCOPY N/A 11/15/2020    COLONOSCOPY DIAGNOSTIC performed by Bianca Fernandez MD at [facility]    FOOT SURGERY Right     2018, R foot osteomyelitis    HIP SURGERY Left 6/29/2020    bilateral hip steroid injection, Left HIP FIRST performed by Winter Urrutia MD at [facility] N/A 10/26/2020    SIGMOIDOSCOPY DIAGNOSTIC FLEXIBLE performed by Syed Padilla MD at [facility]      as a baby daily       ondansetron (ZOFRAN) 4 MG tablet Take 4 mg by mouth every 8 hours as needed for Nausea or Vomiting      tamsulosin (FLOMAX) 0.4 MG capsule Take 0.4 mg by mouth nightly       folic acid (FOLVITE) 1 MG tablet Take 1 mg by mouth daily      furosemide (LASIX) 40 MG tablet Take 40 mg by mouth daily       sitaGLIPtan-metformin (JANUMET)  MG per tablet Take 1 tablet by mouth 2 times daily (with meals)       senna (SENOKOT) 8.6 MG tablet Take 1 tablet by mouth 2 times daily      ARIPiprazole (ABILIFY PO) Take 15 mg by mouth daily       Citalopram Hydrobromide (CELEXA PO) Take 30 mg by mouth daily From [facility] professional services       Blood Glucose Monitoring Suppl (FREESTYLE LITE) ARTIE Patient needs all supplies for qd testing. DX: 250.00 1 Device 11       ALLERGIES    Allergies   Allergen Reactions    Penicillins      Tolerated Zosyn 9/24/2020       FAMILY HISTORY    Family History   Problem Relation Age of Onset    Heart Disease Mother         MI    Cancer Paternal Aunt         lung       SOCIAL HISTORY    Social History     Socioeconomic History    Marital status:      Spouse name: None    Number of children: None    Years of education: None    Highest education level: None   Occupational History    None   Tobacco Use    Smoking status: Never Smoker    Smokeless tobacco: Never Used   Vaping Use    Vaping Use: Never used   Substance and Sexual Activity    Alcohol use: Not Currently     Comment: hx of abuse    Drug use: No    Sexual activity: Not Currently   Other Topics Concern    None   Social History Narrative    None     Social Determinants of Health     Financial Resource Strain:     Difficulty of Paying Living Expenses: Not on file   Food Insecurity:     Worried About Running Out of Food in the Last Year: Not on file    Basim of Food in the Last Year: Not on file   Transportation Needs:     Lack of Transportation (Medical):  Not on file    Lack of Transportation (Non-Medical): Not on file   Physical Activity:     Days of Exercise per Week: Not on file    Minutes of Exercise per Session: Not on file   Stress:     Feeling of Stress : Not on file   Social Connections:     Frequency of Communication with Friends and Family: Not on file    Frequency of Social Gatherings with Friends and Family: Not on file    Attends Latter day Services: Not on file    Active Member of [address] [place] [address] or Organizations: Not on file    Attends Club or Organization Meetings: Not on file    Marital Status: Not on file   Intimate Partner Violence:     Fear of Current or Ex-Partner: Not on file    Emotionally Abused: Not on file    Physically Abused: Not on file    Sexually Abused: Not on file   Housing Stability:     Unable to Pay for Housing in the Last Year: Not on file    Number of [place] in the Last Year: Not on file    Unstable Housing in the Last Year: Not on file       REVIEW OF SYSTEMS    Constitutional:  Denies fever, chills, weight loss or weakness   Eyes:  Denies photophobia or discharge   HENT:  Denies sore throat or ear pain   Respiratory:  Denies cough or shortness of breath   Cardiovascular:  Denies chest pain, palpitations or swelling   GI:  Denies abdominal pain, nausea, vomiting, or diarrhea   Musculoskeletal:  Denies back pain   Skin:  Denies rash   Neurologic:  Denies headache, focal weakness or sensory changes, complaining of dizziness  Endocrine:  Denies polyuria or polydypsia   Lymphatic:  Denies swollen glands   Psychiatric:  Denies depression, suicidal ideation or homicidal ideation   All systems negative except as marked. PHYSICAL EXAM    VITAL SIGNS: BP (!) 135/91   Pulse 73   Temp 98.8 °F (37.1 °C) (Oral)   Resp 16   Ht 5' 8\" (1.727 m)   Wt 287 lb (130.2 kg)   SpO2 99%   BMI 43.64 kg/m²    Constitutional:  Well developed, Well nourished, No acute distress, Non-toxic appearance.    HENT:  Normocephalic, Atraumatic, Bilateral external ears normal, Oropharynx moist, No oral exudates, Nose normal. Neck- Normal range of motion, No tenderness, Supple, No stridor. Eyes:  PERRL, EOMI, Conjunctiva normal, No discharge. Respiratory:  Normal breath sounds, No respiratory distress, No wheezing, No chest tenderness. Cardiovascular:  Normal heart rate, Normal rhythm, No murmurs, No rubs, No gallops. GI:  Bowel sounds normal, Soft, No tenderness, No masses, No pulsatile masses. :. No CVA tenderness. Musculoskeletal:  Intact distal pulses, No edema, No tenderness, No cyanosis, No clubbing. Good range of motion in all major joints. No tenderness to palpation or major deformities noted. Back- No tenderness. Integument:  Warm, Dry, No erythema, No rash. Lymphatic:  No lymphadenopathy noted. Neurologic:  Alert & oriented x 3, Normal motor function, Normal sensory function, No focal deficits noted. Psychiatric:  Affect normal, Judgment normal, Mood normal.     EKG    Normal sinus rhythm  Nonspecific T wave abnormality  Ventricular rate 68  UT interval 148 ms  QRS duration 82 ms  QTc interval 446 ms    Labs    RADIOLOGY    ECHO Complete 2D W Doppler W Color  Study Date: 09/24/2020  Patient:  Mya Matamoros 44 y.o.     Reading provider:  Shree Lee MD; Unknown Provider Result   Ordering provider:  SANDRA Colindres - CNP       Result Information    Status: Edited Result - FINAL (Resulted: 9/24/2020) Provider Status: Ordered     ECHO Complete 2D W Doppler W Color  Order: [id number]   Status: Edited Result - FINAL       Visible to patient: No (not released)       Next appt: 01/06/2022 at 10:20 AM in Rheumatology (Antonio DIAZ DO)       0 Result Notes      Details    Reading Physician Reading Date Result Priority   Shree Lee MD  [phone] 9/24/2020    Unknown Provider Result 9/24/2020      Narrative & Impression  Transthoracic Echocardiography Report (TTE)      Demographics      Patient Name  [facility] Gender             Male                 Jesus Mar      MR #          [id number]        Race               Black                                     Ethnicity      Account #     [de-identified]        Room Number        0004      Accession     [id number]       Date of Study      09/24/2020   Number      Date of Birth 1970       Referring          Axel Cornejo MD                                  Physician          Hiro MONAHANPN - CNP      Age           44 year(s)       Sonographer        [facility]                                     Interpreting       Echo reader of the week                                  Physician          Aracely Medina MD     Procedure     Type of Study      TTE procedure:ECHOCARDIOGRAM COMPLETE 2D W DOPPLER W COLOR. Procedure Date  Date: 09/24/2020 Start: 10:14 AM     Study Location: Bedside  Technical Quality: Limited visualization due to body habitus.     Indications:Hypoxia, Fatigue and Dyspnea / Shortness of breath.     Additional Medical History:A fib, diabetes, CHF, ETOH abuse, Morbid obesity,  KIARA, GERD, Anemia, COVID     Patient Status: Routine     Height: 67.72 inches Weight: 285.01 pounds BSA: 2.37 m^2 BMI: 43.7 kg/m^2     BP: 99/45 mmHg      Conclusions      Summary   Ejection fraction is visually estimated at 60%. Overall left ventricular function is normal.   The aortic valve leaflets were not well visualized. Aortic valve appears tricuspid. Aortic valve leaflets are somewhat thickened. Aortic valve leaflets are Mildly calcified. The maximum aortic valve gradient is 30 mmHg, the mean gradient is 15   mmHg, and the peak velocity is 275 cm/s. There is mild-to-moderate aortic stenosis with valve area of 1.5 sq cm.       Signature      ----------------------------------------------------------------   Electronically signed by Aracely Medina MD (Interpreting   physician) on 09/24/2020 at 04:36 PM   ----------------------------------------------------------------      Findings      Mitral Valve   The mitral valve structure was normal with normal leaflet separation. DOPPLER: The transmitral velocity was within the normal range with no   evidence for mitral stenosis. There was no evidence of mitral   regurgitation. Aortic Valve   The aortic valve leaflets were not well visualized. Aortic valve appears tricuspid. Aortic valve leaflets are somewhat thickened. Aortic valve leaflets are Mildly calcified. The maximum aortic valve gradient is 30 mmHg, the mean gradient is 15   mmHg, and the peak velocity is 275 cm/s. There is mild-to-moderate aortic stenosis with valve area of 1.5 sq cm. Tricuspid Valve   Tricuspid valve was not well visualized. Mild tricuspid regurgitation. Pulmonic Valve   The pulmonic valve was not well visualized . Trivial pulmonic regurgitation visualized. Left Atrium   Left atrial size was normal.      Left Ventricle   Ejection fraction is visually estimated at 60%. Overall left ventricular function is normal.      Right Atrium   Right atrial size was normal.      Right Ventricle   The right ventricular size was normal with normal systolic function and   wall thickness. Pericardial Effusion   The pericardium was normal in appearance with no evidence of a pericardial   effusion. Pleural Effusion   No evidence of pleural effusion. Aorta / Great Vessels   -Aortic root dimension within normal limits.   -The Pulmonary artery is within normal limits. -IVC size is within normal limits with normal respiratory phasic changes.      M-Mode/2D Measurements & Calculations      LV Diastolic   LV Systolic Dimension:    AV Cusp Separation: 1.9 cmLA   Dimension: 5.4 3.4 cm                    Dimension: 3.2 cmAO Root   cm             LV Volume Diastolic: 778  Dimension: 2.8 cmLA Area: 23.8   LV FS:37 %     ml                        cm^2   LV PW LV Volume Systolic: 26.2   Diastolic: 1.2 ml   cm             LV EDV/LV EDV Index: 141   Septum         ml/59 m^2LV ESV/LV ESV    RV Diastolic Dimension: 2.6 cm   Diastolic: 1.1 Index: 61.8 ml/20 m^2   cm             EF Calculated: 66.4 %     LA/Aorta: 1.14                                               LA volume/Index: 78.3 ml /33m^2                     LVOT: 2.1 cm     Doppler Measurements & Calculations      MV Peak E-Wave: 122  AV Peak Velocity: 275   LVOT Peak Velocity: 110 cm/s   cm/s                 cm/s                    LVOT Mean Velocity: 72.8 cm/s   MV Peak A-Wave: 87.8 AV Peak Gradient: 30.25 LVOT Peak Gradient: 4   cm/s                 mmHg                    mmHgLVOT Mean Gradient: 2   MV E/A Ratio: 1.39   AV Mean Velocity: 186   mmHg   MV Peak Gradient:    cm/s   5.95 mmHg            AV Mean Gradient: 13    TV Peak E-Wave: 66.8 cm/s                        mmHg                    TV Peak A-Wave: 61.3 cm/s   MV Deceleration      AV VTI: 49.2 cm   Time: 236 msec       AV Area                 TV Peak Gradient: 1.78 mmHg   MV P1/2t: 69 msec    (Continuity):1.37 cm^2  TR Velocity:248 cm/s   MVA by PHT:3.19 cm^2                         TR Gradient:24.6 mmHg                        LVOT VTI: 19.4 cm       PV Peak Velocity: 75.8 cm/s   MV E' Septal         IVRT: 56 msec           PV Peak Gradient: 2.3 mmHg   Velocity: 9.7 cm/s   MV A' Septal   Velocity: 7.4 cm/s   AV DVI (VTI): 0.39AV    [place] ED Velocity: 152 cm/s   MV E' Lateral        DVI (Vmax):0.4   Velocity: 13.5 cm/s   MV A' Lateral   Velocity: 7.5 cm/s   E/E' septal: 12.58   E/E' lateral: 9.04     [email or web address] [id number]            (important suggestion)  Newer results are available. Click to view them now.        Component 1 yr ago   Left Ventricular Ejection Fraction 60    LVEF MODALITY ECHO                 Specimen Collected: 09/24/20 10:14                   ED COURSE & MEDICAL DECISION MAKING    Pertinent Labs & Imaging studies reviewed. (See chart for details)  Patient the testing were reviewed. Patient has left-sided DVT. CT angiogram for pulmonary embolism was pending. Patient's care will be assumed by Dr. Olivia Durand at the end of my shift. FINAL IMPRESSION    1. Acute deep vein thrombosis (DVT) of left lower extremity, unspecified vein (HCC)    2.  Bilateral leg pain             Erika Carroll MD  12/30/21 0446

## 2021-12-30 NOTE — ED NOTES
Upon first contact with patient this RN receives bedside shift report from [facility]. Pt resting on cot with eyes closed. Pt arousable to voice. Pt alert and oriented. Pt shows no signs of distress and denies any needs.         Anne Marie HOWELL RN  12/30/21 0433

## 2021-12-30 NOTE — ED NOTES
Bed: 017A  Expected date: 12/30/21  Expected time: 12:39 PM  Means of arrival: [facility]  Comments:     Erum Guevara RN  12/30/21 5728

## 2021-12-31 LAB
EKG ATRIAL RATE: 68 BPM
EKG P AXIS: 38 DEGREES
EKG P-R INTERVAL: 148 MS
EKG Q-T INTERVAL: 420 MS
EKG QRS DURATION: 82 MS
EKG QTC CALCULATION (BAZETT): 446 MS
EKG R AXIS: -28 DEGREES
EKG T AXIS: 53 DEGREES
EKG VENTRICULAR RATE: 68 BPM

## 2021-12-31 NOTE — ED PROVIDER NOTES
[address] [facility] [address] [address] EMERGENCY DEPT  Emergency Department  Faculty Sign-Out Addendum     Care of Trever Gan was assumed from previous attending and is being seen for Leg Pain, Leg Swelling, and Fatigue  . The patient's initial evaluation and plan have been discussed with the prior provider who initially evaluated the patient. EMERGENCY DEPARTMENT COURSE / MEDICAL DECISION MAKING       MEDICATIONS GIVEN:  Orders Placed This Encounter   Medications    iopamidol (ISOVUE-370) 76 % injection 85 mL    HYDROcodone-acetaminophen (NORCO) 5-325 MG per tablet 1 tablet    HYDROcodone-acetaminophen (NORCO) 5-325 MG per tablet     Sig: Take 1 tablet by mouth every 6 hours as needed for Pain for up to 2 days. Intended supply: 3 days.  Take lowest dose possible to manage pain     Dispense:  5 tablet     Refill:  0       LABS / RADIOLOGY:     Labs Reviewed   CBC WITH AUTO DIFFERENTIAL - Abnormal; Notable for the following components:       Result Value    WBC 11.2 (*)     RDW-SD 47.9 (*)     Segs Absolute 8.7 (*)     All other components within normal limits   COMPREHENSIVE METABOLIC PANEL W/ REFLEX TO MG FOR LOW K - Abnormal; Notable for the following components:    Glucose 112 (*)     CREATININE 1.3 (*)     BUN 23 (*)     Alkaline Phosphatase 173 (*)     All other components within normal limits   LIPASE - Abnormal; Notable for the following components:    Lipase 54.6 (*)     All other components within normal limits   TROPONIN - Abnormal; Notable for the following components:    Troponin T 0.011 (*)     All other components within normal limits   D-DIMER, QUANTITATIVE - Abnormal; Notable for the following components:    D-Dimer, Quant 754.00 (*)     All other components within normal limits   BLOOD GAS, ARTERIAL - Abnormal; Notable for the following components:    pH, Blood Gas 7.34 (*)     PCO2 57 (*)     PO2 52 (*)     HCO3 30 (*)     All other components within normal limits   GLOMERULAR FILTRATION RATE, ESTIMATED - Abnormal; Notable for the following components:    Est, Glom Filt Rate 70 (*)     All other components within normal limits   TROPONIN - Abnormal; Notable for the following components:    Troponin T 0.010 (*)     All other components within normal limits   BRAIN NATRIURETIC PEPTIDE   ANION GAP   OSMOLALITY       XR TIBIA FIBULA RIGHT (2 VIEWS)    Result Date: 12/30/2021  PROCEDURE: XR TIBIA FIBULA RIGHT (2 VIEWS) CLINICAL INFORMATION: Right lower extremity pain. COMPARISON: Right foot radiographs 10/15/2019. TECHNIQUE: AP and lateral views of the tibia and fibula performed. FINDINGS: Innumerable tiny soft tissue calcifications are observed. No fracture is identified. Moderate chronic arthritic changes are noted at the knee and ankle joint. No fracture or acute bony abnormality visualized. Extensive soft tissue calcification consistent with the clinical history of dermatomyositis. Moderate chronic arthritic changes noted at the knee and ankle joints. **This report has been created using voice recognition software. It may contain minor errors which are inherent in voice recognition technology. ** Final report electronically signed by Dr Filipe Miranda on 12/30/2021 2:25 PM    CT HEAD WO CONTRAST    Result Date: 12/30/2021  PROCEDURE: CT HEAD WO CONTRAST CLINICAL INFORMATION: Somnolence, Trauma. COMPARISON: CT head dated 10/20/2020. TECHNIQUE: Noncontrast 5 mm axial images were obtained through the brain. Sagittal and coronal reconstructions were created. All CT scans at this facility use dose modulation, iterative reconstruction, and/or weight-based dosing when appropriate to reduce radiation dose to as low as reasonably achievable. FINDINGS: The ventricles, cisterns and sulci are symmetric and normal in size and configuration. Gray-white matter differentiation appears grossly preserved. No intracranial hemorrhage, mass effect or midline shift is identified. The calvarium appears intact.  Orbits are unremarkable. Visualized paranasal sinuses are clear. Mastoid air cells are clear. There are innumerable small calcifications in the subcutaneous tissues of the scalp and nuchal soft tissues, stable compared to prior exam.      No evidence of acute intracranial abnormality. **This report has been created using voice recognition software. It may contain minor errors which are inherent in voice recognition technology. ** Final report electronically signed by Dr. Cynthia Pérez MD on 12/30/2021 3:15 PM    CT SOFT TISSUE NECK W CONTRAST    Result Date: 12/5/2021  PROCEDURE: CT SOFT TISSUE NECK W CONTRAST CLINICAL INFORMATION: right maxillary pain/swelling into neck. COMPARISON: No prior study. TECHNIQUE: 3 mm axial images were obtained through the neck soft tissues after the administration of intravenous contrast. Sagittal and coronal reconstructions were obtained. All CT scans at this facility use dose modulation, iterative reconstruction, and/or weight-based dosing when appropriate to reduce radiation dose to as low as reasonably achievable. FINDINGS: There is abnormal soft tissue thickening in the right facial soft tissues. This is centered overlying the mandible. There is some thickening of the underlying fascia. There is an associated apical abscess of the right 2nd maxillary molar. There is an associated dental caries. There is no associated soft tissue abscess. There is an associated enlarged right submandibular lymph node. This is likely reactive. Other dental caries are present. There are dental caries of posterior maxillary and mandibular molars bilaterally. The soft tissues of the nasopharynx are normal.  The oropharynx is within appropriate limits. The hypopharynx is normal. The epiglottis is normal.  The oral cavity and floor of mouth are normal.  The vocal cords and subglottic airway are within appropriate limits.  The thyroid gland, submandibular glands and parotid glands are within acceptable limits. There are enlarged right submandibular lymph nodes. There is a midline enlarged submental lymph node. There is no other cervical adenopathy. There is no upper mediastinal adenopathy. There are no suspicious findings the lung apices. No suspicious osseous lesions are present. The visualized aspects of the paranasal sinuses are normally aerated. There are no gross abnormalities in the brain parenchyma. There are multiple scattered skin and soft tissue calcifications. These are most pronounced posteriorly near the base of the skull. 1. Right facial cellulitis with an associated apical abscess of the right 2nd mandibular molar. There is no soft tissue abscess. 2. Multiple bilateral dental caries. 3. Enlarged right submandibular lymph nodes. These are likely reactive. **This report has been created using voice recognition software. It may contain minor errors which are inherent in voice recognition technology. ** Final report electronically signed by Dr. Phyllis Box on 12/5/2021 2:14 PM    CTA CHEST W WO CONTRAST    Result Date: 12/30/2021  CTA THORAX / PULMONARY EMBOLUS STUDY: CLINICAL INFORMATION: Elevated D-dimer, shortness of breath COMPARISON; 9/6/2010 TECHNIQUE: 1.5 mm axial images were obtained through the chest following the administration of IV contrast (ISOVUE). A non-contrast localizer was obtained. 3D reconstructions were performed on the scanner to include sagittal and coronal MIP images through both the right and left pulmonary arteries. ALL CT SCANS AT THIS FACILITY use dose modulation, iterative reconstruction, and/or weight-based dosing when appropriate to reduce radiation dose to as low as reasonably achievable. FINDINGS: Upper abdomen: Hepatic steatosis. Mediastinum and andres: No abnormally enlarged or suspicious appearing lymph nodes are seen. . Bones: No evidence for acute fracture or bone destruction. Lungs: Mild atelectatic/fibrotic stranding both upper lobes.  Mild groundglass infiltrates both mid and lower lung fields. No effusion seen. Vascular: No pulmonary emboli are seen. There is no large vessel aneurysm or dissection. 1.  No pulmonary emboli are seen. 2. Mild groundglass infiltrates both mid and lower lung fields, consistent with interstitial pneumonitis. **This report has been created using voice recognition software. It may contain minor errors which are inherent in voice recognition technology. **  Final report electronically signed by Dr. Shahab Schaffer on 12/30/2021 5:04 PM    XR CHEST PORTABLE    Result Date: 12/30/2021  PROCEDURE: XR CHEST PORTABLE CLINICAL INFORMATION: Shortness of breath TECHNIQUE: Mobile AP chest radiograph. COMPARISON: Mobile AP chest radiograph 9/6/2021 FINDINGS: Cardiomediastinal silhouette is within normal limits. Lung volumes are low. There are no lung consolidations. Degenerative changes in the thoracic spine are poorly visualized. No acute intrathoracic process. **This report has been created using voice recognition software. It may contain minor errors which are inherent in voice recognition technology. ** Final report electronically signed by Dr. Jatin Cooper on 12/30/2021 1:31 PM    VL DUP LOWER EXTREMITY VENOUS BILATERAL    Result Date: 12/30/2021  PROCEDURE: VL DUP LOWER EXTREMITY VENOUS BILATERAL CLINICAL INFORMATION: Bilateral lower extremity pain and swelling. COMPARISON: No prior study. TECHNIQUE: Grayscale and color Doppler imaging of the bilateral lower extremities performed in longitudinal and transverse planes from the inguinal region to the distal calf. FINDINGS: There is normal venous flow within and compressibility of the right, left, bilateral common femoral, femoral, profunda femoral and popliteal veins. There is normal augmentation.  Thrombus is visualized within one of the left posterior tibial veins There is otherwise normal flow within and compressibility of the bilateral gastrocnemius, anterior tibial, posterior tibial and the peroneal veins. No popliteal cyst is identified in either lower extremity. Positive for left lower extremity deep venous thrombosis with thrombus visualized within one of the left posterior tibial veins. The remaining left lower extremity deep veins are patent and unremarkable. Negative for right lower extremity deep venous thrombosis. **This report has been created using voice recognition software. It may contain minor errors which are inherent in voice recognition technology. ** Final report electronically signed by Dr Deann Todd on 12/30/2021 3:13 PM      RECENT VITALS:     Temp: 98.8 °F (37.1 °C),  Pulse: 68, Resp: 17, BP: (!) 146/98, SpO2: 96 %    This patient is a 46 y.o. Male with bilateral lower extremity pain and swelling. Left greater than right. Recent EGD and patient was off blood thinners for 5 days and resumed 2 days ago. Previously took norco at home and is out. No CP. No SOB. OUTSTANDING TASKS / RECOMMENDATIONS    1. CTA chest is pending. Disposition based on results. FINAL DISPOSITION AND ED COURSE     On my examination, patient resting comfortably. Reports LLE pain. HEENT: WNL  Lungs: Clear and equal bilaterally. No increased work of breathing or respiratory distress. Heart: Rate and rhythm regular, no murmurs clicks or gallops  Abdomen: Soft, nondistended, nontender   Lower extremities: Bilateral edema, negative Homans bilaterally  Neuro: Awake and alert, no lateralizing deficits, cranial nerves II through XII grossly intact bilaterally    ED COURSE    Troponin found to be elevated indeterminate. Patient with CKD. CTA Chest without PE. ?interstitatal changes. Likely chronic as patient without respiratory complaint. Troponin trended and did not rise. Plan to DC home. Patient resumed his eliquis 2 days ago. He reports he is out of his pain medication for chronic LE pain.  Given the acute DVT I gave him a short course of norco .       Clinical Impression 1. Acute deep vein thrombosis (DVT) of left lower extremity, unspecified vein ([address]    2. Bilateral leg pain          FINAL DISPOSITION    DISPOSITION Decision To Discharge 12/30/2021 08:19:48 PM      PATIENT REFERRED TO:  SANDRA Dan - ABI  Chauncey Gallegos  [phone]    Schedule an appointment as soon as possible for a visit in 3 days        DISCHARGE MEDICATIONS:  Discharge Medication List as of 12/30/2021  7:23 PM      START taking these medications    Details   HYDROcodone-acetaminophen (NORCO) 5-325 MG per tablet Take 1 tablet by mouth every 6 hours as needed for Pain for up to 2 days. Intended supply: 3 days.  Take lowest dose possible to manage pain, Disp-5 tablet, R-0Print                    (Please note that portions of this note were completed with a voice recognition program.  Efforts were made to edit the dictations but occasionally words are mis-transcribed.)      [address] [place] [address]  Attending Emergency Physician  [facility] [address] [address] EMERGENCY DEPT      Earl Kidney, DO  12/31/21 1416

## 2022-01-05 ENCOUNTER — APPOINTMENT (OUTPATIENT)
Dept: CT IMAGING | Age: 52
End: 2022-01-05
Payer: MEDICARE

## 2022-01-05 ENCOUNTER — HOSPITAL ENCOUNTER (OUTPATIENT)
Age: 52
Setting detail: OBSERVATION
Discharge: HOME | End: 2022-01-06
Attending: EMERGENCY MEDICINE | Admitting: STUDENT IN AN ORGANIZED HEALTH CARE EDUCATION/TRAINING PROGRAM
Payer: MEDICARE

## 2022-01-05 ENCOUNTER — APPOINTMENT (OUTPATIENT)
Dept: GENERAL RADIOLOGY | Age: 52
End: 2022-01-05
Payer: MEDICARE

## 2022-01-05 DIAGNOSIS — S20.219A CONTUSION OF CHEST WALL, UNSPECIFIED LATERALITY, INITIAL ENCOUNTER: ICD-10-CM

## 2022-01-05 DIAGNOSIS — M54.16 LUMBAR RADICULOPATHY: ICD-10-CM

## 2022-01-05 DIAGNOSIS — R29.6 FREQUENT FALLS: Primary | ICD-10-CM

## 2022-01-05 DIAGNOSIS — R26.2 INABILITY TO WALK: ICD-10-CM

## 2022-01-05 LAB
ALBUMIN SERPL-MCNC: 4.2 G/DL (ref 3.5–5.1)
ALP BLD-CCNC: 175 U/L (ref 38–126)
ALT SERPL-CCNC: 28 U/L (ref 11–66)
AMMONIA: 60 UMOL/L (ref 11–60)
ANION GAP SERPL CALCULATED.3IONS-SCNC: 12 MEQ/L (ref 8–16)
AST SERPL-CCNC: 30 U/L (ref 5–40)
BASOPHILS # BLD: 0.4 %
BASOPHILS ABSOLUTE: 0 THOU/MM3 (ref 0–0.1)
BILIRUB SERPL-MCNC: 0.4 MG/DL (ref 0.3–1.2)
BUN BLDV-MCNC: 25 MG/DL (ref 7–22)
CALCIUM SERPL-MCNC: 9.6 MG/DL (ref 8.5–10.5)
CHLORIDE BLD-SCNC: 103 MEQ/L (ref 98–111)
CO2: 26 MEQ/L (ref 23–33)
CREAT SERPL-MCNC: 1.3 MG/DL (ref 0.4–1.2)
EKG ATRIAL RATE: 64 BPM
EKG P AXIS: 150 DEGREES
EKG P-R INTERVAL: 166 MS
EKG Q-T INTERVAL: 420 MS
EKG QRS DURATION: 82 MS
EKG QTC CALCULATION (BAZETT): 433 MS
EKG R AXIS: -148 DEGREES
EKG T AXIS: -170 DEGREES
EKG VENTRICULAR RATE: 64 BPM
EOSINOPHIL # BLD: 2.8 %
EOSINOPHILS ABSOLUTE: 0.3 THOU/MM3 (ref 0–0.4)
ERYTHROCYTE [DISTWIDTH] IN BLOOD BY AUTOMATED COUNT: 14.3 % (ref 11.5–14.5)
ERYTHROCYTE [DISTWIDTH] IN BLOOD BY AUTOMATED COUNT: 52.4 FL (ref 35–45)
ETHYL ALCOHOL, SERUM: < 0.01 %
GFR SERPL CREATININE-BSD FRML MDRD: 70 ML/MIN/1.73M2
GLUCOSE BLD-MCNC: 128 MG/DL (ref 70–108)
GLUCOSE BLD-MCNC: 156 MG/DL (ref 70–108)
HCT VFR BLD CALC: 51.1 % (ref 42–52)
HEMOGLOBIN: 15.7 GM/DL (ref 14–18)
IMMATURE GRANS (ABS): 0.05 THOU/MM3 (ref 0–0.07)
IMMATURE GRANULOCYTES: 0.5 %
LIPASE: 47.7 U/L (ref 5.6–51.3)
LYMPHOCYTES # BLD: 15.5 %
LYMPHOCYTES ABSOLUTE: 1.6 THOU/MM3 (ref 1–4.8)
MCH RBC QN AUTO: 30.7 PG (ref 26–33)
MCHC RBC AUTO-ENTMCNC: 30.7 GM/DL (ref 32.2–35.5)
MCV RBC AUTO: 99.8 FL (ref 80–94)
MONOCYTES # BLD: 7.3 %
MONOCYTES ABSOLUTE: 0.8 THOU/MM3 (ref 0.4–1.3)
NUCLEATED RED BLOOD CELLS: 0 /100 WBC
OSMOLALITY CALCULATION: 288.9 MOSMOL/KG (ref 275–300)
PLATELET # BLD: 191 THOU/MM3 (ref 130–400)
PMV BLD AUTO: 11.1 FL (ref 9.4–12.4)
POTASSIUM SERPL-SCNC: 4.7 MEQ/L (ref 3.5–5.2)
RBC # BLD: 5.12 MILL/MM3 (ref 4.7–6.1)
SEG NEUTROPHILS: 73.5 %
SEGMENTED NEUTROPHILS ABSOLUTE COUNT: 7.6 THOU/MM3 (ref 1.8–7.7)
SODIUM BLD-SCNC: 141 MEQ/L (ref 135–145)
TOTAL CK: 201 U/L (ref 55–170)
TOTAL PROTEIN: 6.5 G/DL (ref 6.1–8)
TROPONIN T: 0.01 NG/ML
TROPONIN T: < 0.01 NG/ML
WBC # BLD: 10.3 THOU/MM3 (ref 4.8–10.8)

## 2022-01-05 PROCEDURE — G0378 HOSPITAL OBSERVATION PER HR: HCPCS

## 2022-01-05 PROCEDURE — 82140 ASSAY OF AMMONIA: CPT

## 2022-01-05 PROCEDURE — 2580000003 HC RX 258: Performed by: EMERGENCY MEDICINE

## 2022-01-05 PROCEDURE — 93005 ELECTROCARDIOGRAM TRACING: CPT | Performed by: EMERGENCY MEDICINE

## 2022-01-05 PROCEDURE — 71260 CT THORAX DX C+: CPT

## 2022-01-05 PROCEDURE — 6360000004 HC RX CONTRAST MEDICATION: Performed by: EMERGENCY MEDICINE

## 2022-01-05 PROCEDURE — 84484 ASSAY OF TROPONIN QUANT: CPT

## 2022-01-05 PROCEDURE — 80053 COMPREHEN METABOLIC PANEL: CPT

## 2022-01-05 PROCEDURE — 2580000003 HC RX 258: Performed by: STUDENT IN AN ORGANIZED HEALTH CARE EDUCATION/TRAINING PROGRAM

## 2022-01-05 PROCEDURE — 83690 ASSAY OF LIPASE: CPT

## 2022-01-05 PROCEDURE — 73552 X-RAY EXAM OF FEMUR 2/>: CPT

## 2022-01-05 PROCEDURE — 72125 CT NECK SPINE W/O DYE: CPT

## 2022-01-05 PROCEDURE — 82077 ASSAY SPEC XCP UR&BREATH IA: CPT

## 2022-01-05 PROCEDURE — 6370000000 HC RX 637 (ALT 250 FOR IP): Performed by: STUDENT IN AN ORGANIZED HEALTH CARE EDUCATION/TRAINING PROGRAM

## 2022-01-05 PROCEDURE — 99222 1ST HOSP IP/OBS MODERATE 55: CPT | Performed by: STUDENT IN AN ORGANIZED HEALTH CARE EDUCATION/TRAINING PROGRAM

## 2022-01-05 PROCEDURE — 82550 ASSAY OF CK (CPK): CPT

## 2022-01-05 PROCEDURE — 93010 ELECTROCARDIOGRAM REPORT: CPT | Performed by: INTERNAL MEDICINE

## 2022-01-05 PROCEDURE — 73630 X-RAY EXAM OF FOOT: CPT

## 2022-01-05 PROCEDURE — 85025 COMPLETE CBC W/AUTO DIFF WBC: CPT

## 2022-01-05 PROCEDURE — 36415 COLL VENOUS BLD VENIPUNCTURE: CPT

## 2022-01-05 PROCEDURE — 74177 CT ABD & PELVIS W/CONTRAST: CPT

## 2022-01-05 PROCEDURE — 73590 X-RAY EXAM OF LOWER LEG: CPT

## 2022-01-05 PROCEDURE — 70450 CT HEAD/BRAIN W/O DYE: CPT

## 2022-01-05 PROCEDURE — 82948 REAGENT STRIP/BLOOD GLUCOSE: CPT

## 2022-01-05 PROCEDURE — 99284 EMERGENCY DEPT VISIT MOD MDM: CPT

## 2022-01-05 RX ORDER — CITALOPRAM 20 MG/1
30 TABLET ORAL DAILY
Status: DISCONTINUED | OUTPATIENT
Start: 2022-01-05 | End: 2022-01-06 | Stop reason: HOSPADM

## 2022-01-05 RX ORDER — ONDANSETRON 4 MG/1
4 TABLET, ORALLY DISINTEGRATING ORAL EVERY 8 HOURS PRN
Status: DISCONTINUED | OUTPATIENT
Start: 2022-01-05 | End: 2022-01-06 | Stop reason: HOSPADM

## 2022-01-05 RX ORDER — TAMSULOSIN HYDROCHLORIDE 0.4 MG/1
0.4 CAPSULE ORAL NIGHTLY
Status: DISCONTINUED | OUTPATIENT
Start: 2022-01-05 | End: 2022-01-06 | Stop reason: HOSPADM

## 2022-01-05 RX ORDER — INSULIN GLARGINE 100 [IU]/ML
25 INJECTION, SOLUTION SUBCUTANEOUS NIGHTLY
Status: DISCONTINUED | OUTPATIENT
Start: 2022-01-05 | End: 2022-01-05

## 2022-01-05 RX ORDER — POTASSIUM CHLORIDE 20 MEQ/1
40 TABLET, EXTENDED RELEASE ORAL PRN
Status: DISCONTINUED | OUTPATIENT
Start: 2022-01-05 | End: 2022-01-06 | Stop reason: HOSPADM

## 2022-01-05 RX ORDER — DEXTROSE MONOHYDRATE 50 MG/ML
100 INJECTION, SOLUTION INTRAVENOUS PRN
Status: DISCONTINUED | OUTPATIENT
Start: 2022-01-05 | End: 2022-01-06 | Stop reason: HOSPADM

## 2022-01-05 RX ORDER — SODIUM CHLORIDE 0.9 % (FLUSH) 0.9 %
5-40 SYRINGE (ML) INJECTION PRN
Status: DISCONTINUED | OUTPATIENT
Start: 2022-01-05 | End: 2022-01-06 | Stop reason: HOSPADM

## 2022-01-05 RX ORDER — ONDANSETRON 2 MG/ML
4 INJECTION INTRAMUSCULAR; INTRAVENOUS EVERY 6 HOURS PRN
Status: DISCONTINUED | OUTPATIENT
Start: 2022-01-05 | End: 2022-01-06 | Stop reason: HOSPADM

## 2022-01-05 RX ORDER — SODIUM CHLORIDE 9 MG/ML
25 INJECTION, SOLUTION INTRAVENOUS PRN
Status: DISCONTINUED | OUTPATIENT
Start: 2022-01-05 | End: 2022-01-06 | Stop reason: HOSPADM

## 2022-01-05 RX ORDER — SODIUM CHLORIDE 9 MG/ML
INJECTION, SOLUTION INTRAVENOUS CONTINUOUS
Status: DISCONTINUED | OUTPATIENT
Start: 2022-01-05 | End: 2022-01-06 | Stop reason: HOSPADM

## 2022-01-05 RX ORDER — BUSPIRONE HYDROCHLORIDE 10 MG/1
10 TABLET ORAL 2 TIMES DAILY
Status: DISCONTINUED | OUTPATIENT
Start: 2022-01-05 | End: 2022-01-06 | Stop reason: HOSPADM

## 2022-01-05 RX ORDER — NICOTINE POLACRILEX 4 MG
15 LOZENGE BUCCAL PRN
Status: DISCONTINUED | OUTPATIENT
Start: 2022-01-05 | End: 2022-01-06 | Stop reason: HOSPADM

## 2022-01-05 RX ORDER — ACETAMINOPHEN 325 MG/1
650 TABLET ORAL EVERY 6 HOURS PRN
Status: DISCONTINUED | OUTPATIENT
Start: 2022-01-05 | End: 2022-01-06 | Stop reason: HOSPADM

## 2022-01-05 RX ORDER — INSULIN GLARGINE 100 [IU]/ML
20 INJECTION, SOLUTION SUBCUTANEOUS NIGHTLY
Status: DISCONTINUED | OUTPATIENT
Start: 2022-01-05 | End: 2022-01-06 | Stop reason: HOSPADM

## 2022-01-05 RX ORDER — PREGABALIN 50 MG/1
50 CAPSULE ORAL 3 TIMES DAILY
Status: DISCONTINUED | OUTPATIENT
Start: 2022-01-05 | End: 2022-01-06 | Stop reason: HOSPADM

## 2022-01-05 RX ORDER — SODIUM CHLORIDE 0.9 % (FLUSH) 0.9 %
5-40 SYRINGE (ML) INJECTION EVERY 12 HOURS SCHEDULED
Status: DISCONTINUED | OUTPATIENT
Start: 2022-01-05 | End: 2022-01-06 | Stop reason: HOSPADM

## 2022-01-05 RX ORDER — ARIPIPRAZOLE 15 MG/1
15 TABLET ORAL DAILY
Status: DISCONTINUED | OUTPATIENT
Start: 2022-01-05 | End: 2022-01-06 | Stop reason: HOSPADM

## 2022-01-05 RX ORDER — MAGNESIUM SULFATE IN WATER 40 MG/ML
2000 INJECTION, SOLUTION INTRAVENOUS PRN
Status: DISCONTINUED | OUTPATIENT
Start: 2022-01-05 | End: 2022-01-06 | Stop reason: HOSPADM

## 2022-01-05 RX ORDER — ACETAMINOPHEN 650 MG/1
650 SUPPOSITORY RECTAL EVERY 6 HOURS PRN
Status: DISCONTINUED | OUTPATIENT
Start: 2022-01-05 | End: 2022-01-06 | Stop reason: HOSPADM

## 2022-01-05 RX ORDER — POLYETHYLENE GLYCOL 3350 17 G/17G
17 POWDER, FOR SOLUTION ORAL DAILY PRN
Status: DISCONTINUED | OUTPATIENT
Start: 2022-01-05 | End: 2022-01-06 | Stop reason: HOSPADM

## 2022-01-05 RX ORDER — ALLOPURINOL 300 MG/1
300 TABLET ORAL DAILY
Status: DISCONTINUED | OUTPATIENT
Start: 2022-01-05 | End: 2022-01-06 | Stop reason: HOSPADM

## 2022-01-05 RX ORDER — FUROSEMIDE 40 MG/1
40 TABLET ORAL DAILY
Status: DISCONTINUED | OUTPATIENT
Start: 2022-01-06 | End: 2022-01-06 | Stop reason: HOSPADM

## 2022-01-05 RX ORDER — ATORVASTATIN CALCIUM 40 MG/1
40 TABLET, FILM COATED ORAL NIGHTLY
Status: DISCONTINUED | OUTPATIENT
Start: 2022-01-05 | End: 2022-01-06 | Stop reason: HOSPADM

## 2022-01-05 RX ORDER — DEXTROSE MONOHYDRATE 25 G/50ML
12.5 INJECTION, SOLUTION INTRAVENOUS PRN
Status: DISCONTINUED | OUTPATIENT
Start: 2022-01-05 | End: 2022-01-06 | Stop reason: HOSPADM

## 2022-01-05 RX ORDER — HYDROCODONE BITARTRATE AND ACETAMINOPHEN 5; 325 MG/1; MG/1
1 TABLET ORAL EVERY 6 HOURS PRN
Status: DISCONTINUED | OUTPATIENT
Start: 2022-01-05 | End: 2022-01-06 | Stop reason: HOSPADM

## 2022-01-05 RX ORDER — POTASSIUM CHLORIDE 7.45 MG/ML
10 INJECTION INTRAVENOUS PRN
Status: DISCONTINUED | OUTPATIENT
Start: 2022-01-05 | End: 2022-01-06 | Stop reason: HOSPADM

## 2022-01-05 RX ADMIN — APIXABAN 5 MG: 5 TABLET, FILM COATED ORAL at 21:54

## 2022-01-05 RX ADMIN — BUSPIRONE HYDROCHLORIDE 10 MG: 10 TABLET ORAL at 21:54

## 2022-01-05 RX ADMIN — SODIUM CHLORIDE: 9 INJECTION, SOLUTION INTRAVENOUS at 21:45

## 2022-01-05 RX ADMIN — IOPAMIDOL 80 ML: 755 INJECTION, SOLUTION INTRAVENOUS at 10:35

## 2022-01-05 RX ADMIN — ALLOPURINOL 300 MG: 300 TABLET ORAL at 21:54

## 2022-01-05 RX ADMIN — SODIUM CHLORIDE: 9 INJECTION, SOLUTION INTRAVENOUS at 09:49

## 2022-01-05 RX ADMIN — ATORVASTATIN CALCIUM 40 MG: 40 TABLET, FILM COATED ORAL at 21:54

## 2022-01-05 RX ADMIN — PREGABALIN 50 MG: 50 CAPSULE ORAL at 21:56

## 2022-01-05 RX ADMIN — SODIUM CHLORIDE, PRESERVATIVE FREE 10 ML: 5 INJECTION INTRAVENOUS at 21:55

## 2022-01-05 RX ADMIN — CITALOPRAM HYDROBROMIDE 30 MG: 20 TABLET ORAL at 21:54

## 2022-01-05 RX ADMIN — TAMSULOSIN HYDROCHLORIDE 0.4 MG: 0.4 CAPSULE ORAL at 21:54

## 2022-01-05 RX ADMIN — ARIPIPRAZOLE 15 MG: 15 TABLET ORAL at 21:54

## 2022-01-05 ASSESSMENT — PAIN SCALES - GENERAL
PAINLEVEL_OUTOF10: 8
PAINLEVEL_OUTOF10: 10

## 2022-01-05 ASSESSMENT — PAIN DESCRIPTION - ORIENTATION: ORIENTATION: RIGHT

## 2022-01-05 ASSESSMENT — PAIN DESCRIPTION - LOCATION: LOCATION: ABDOMEN;FOOT;LEG

## 2022-01-05 NOTE — ED TRIAGE NOTES
Pt brought in by EMS for c/o abdominal pain, and right foot/leg pain after falling yesterday. Pt denies taking blood thinners. Pt denies LOC. Pt did not take anything for pain PTA.

## 2022-01-05 NOTE — ED NOTES
Upon first contact with patient this RN receives bedside shift report Cherelle Maya RN.      Belen Greene RN  01/05/22 [id number]

## 2022-01-05 NOTE — ED PROVIDER NOTES
[address] ENCOUNTER      PATIENT NAME: Shayan Harper  MRN: [id number]  : 1970  BENJAMIN: 2022  PROVIDER: Charlane Boas, MD      CHIEF COMPLAINT       Chief Complaint   Patient presents with   Aurelio Layer Fall     right foot, leg pain     Abdominal Pain       Patient is seen and evaluated in a timely fashion. Nurses Notes are reviewed and I agree except as noted in the HPI. HISTORY OF PRESENT ILLNESS    Shayan Harper is a 46 y.o. male who presents to Emergency Department with Fall (right foot, leg pain ) and Abdominal Pain     A 66-year-old male with PMH of Morbid obesity, aortic stenosis, BPH, diabetes type 2, GERD, CHF, alcohol abuse, atrial fibrillation on Eliquis, osteomyelitis, hyperlipidemia, hypertension, depression, and DURAN is brought in by EMS for evaluation of lower chest pain and epigastric pain after he fell at home yesterday around 9:00 PM.    Patient states he has \"bad nerve\" after covid-19 infection in 2020 (intubated and multiple readmission after that) and he lost balance and fell forward on the floor. He has not tried over-the-counter analgesics. He now has 7/10 pain from lower mid chest and epigastric area. He also has pain from his right thigh, right tibia-fibula area and right foot. He is a poor historian of note. This HPI was provided by patient.      REVIEW OF SYSTEMS   Ten-point review of systems is negative except those documented in above HPI including constitutional, HEENT, respiratory, cardiovascular, gastrointestinal, genitourinary, musculoskeletal, skin, neurological, hematological and behavioral.      PAST MEDICAL HISTORY     Past Medical History:   Diagnosis Date    Aortic stenosis, mild to moderate     BPH (benign prostatic hyperplasia)     Carpal tunnel syndrome on right     rt hand    Chronic gout     COVID-19 2020    DM2 [facility]diabetes mellitus, type 2) [facility] [address]     Dyslipidemia     GERD (gastroesophageal reflux disease)     Heart failure with preserved ejection fraction (HCC)     History of alcohol abuse     History of atrial fibrillation     History of osteomyelitis     Hx of R foot wound, osteomyelitis July 2018 requiring surgery and IV Vanco    Hyperlipidemia     Hypertension, essential     Hypogonadism, male     Major depression     Mood disorder ([facility] [address]     Morbid obesity ([facility] [address]     DURAN (nonalcoholic steatohepatitis)     KIARA treated with BiPAP     Vitamin D deficiency        SURGICAL HISTORY       Past Surgical History:   Procedure Laterality Date    COLONOSCOPY N/A 11/15/2020    COLONOSCOPY DIAGNOSTIC performed by Yumiko L[facility]pez [facility] at [facility]    ENDOSCOPY, COLON, DIAGNOSTIC      FOOT SURGERY Right     2018, R foot osteomyelitis    HIP SURGERY Left 6/29/2020    bilateral hip steroid injection, Left HIP FIRST performed by Gala Amaya MD at [address] [facility] N/A 10/26/2020    [address] [facility] performed by Jolene Francisco MD at [facility]      as a baby    UPPER GASTROINTESTINAL ENDOSCOPY N/A 11/14/2020    EGD DIAGNOSTIC ONLY performed by Yumiko López MD at [facility] N/A 12/27/2021    EGD performed by Yumiko López MD at [address] [facility]       Previous Medications    ACETAMINOPHEN (TYLENOL) 325 MG TABLET    Take 650 mg by mouth every 4 hours as needed for Pain    ALBUTEROL SULFATE HFA (PROVENTIL HFA) 108 (90 BASE) MCG/ACT INHALER    Inhale 2 puffs into the lungs every 6 hours as needed for Wheezing    ALLOPURINOL (ZYLOPRIM) 300 MG TABLET    Take 1 tablet by mouth daily    APIXABAN (ELIQUIS) 5 MG TABS TABLET    Take 10mg by mouth two times daily for 6 days followed by 5mg by mouth two times daily    ARIPIPRAZOLE (ABILIFY PO)    Take 15 mg by mouth daily     ATORVASTATIN (LIPITOR) 40 MG TABLET    Take 40 mg by mouth nightly    BLOOD GLUCOSE MONITORING SUPPL (FREESTYLE LITE) ARTIE    Patient needs all supplies for qd testing. DX: 250.00    BUSPIRONE (BUSPAR) 10 MG TABLET    Take 10 mg by mouth 2 times daily     CHOLECALCIFEROL PO    Take 2,000 Units by mouth daily    CITALOPRAM HYDROBROMIDE (CELEXA PO)    Take 30 mg by mouth daily From [facility] professional services     CLOTRIMAZOLE-BETAMETHASONE (LOTRISONE) 1-0.05 % CREAM    Apply topically 2 times daily. FERROUS SULFATE (IRON 325) 325 (65 FE) MG TABLET    Take 1 tablet by mouth 2 times daily (with meals)    FOLIC ACID (FOLVITE) 1 MG TABLET    Take 1 mg by mouth daily    FUROSEMIDE (LASIX) 40 MG TABLET    Take 40 mg by mouth daily     GLYCOPYRROLATE-FORMOTEROL (BEVESPI) 9-4.8 MCG/ACT AERO    Inhale 2 puffs into the lungs 2 times daily    HYDRALAZINE (APRESOLINE) 50 MG TABLET    Take 1 tablet by mouth 3 times daily    MULTIPLE VITAMINS-MINERALS (THERAPEUTIC MULTIVITAMIN-MINERALS) TABLET    Take 1 tablet by mouth daily    ONDANSETRON (ZOFRAN) 4 MG TABLET    Take 4 mg by mouth every 8 hours as needed for Nausea or Vomiting    PANTOPRAZOLE (PROTONIX) 40 MG TABLET    Take 1 tablet by mouth 2 times daily (before meals)    POTASSIUM CHLORIDE (KLOR-CON M) 20 MEQ EXTENDED RELEASE TABLET    Take 1 tablet by mouth daily    PREGABALIN (LYRICA) 50 MG CAPSULE    Take 50 mg by mouth 3 times daily. PROBIOTIC PRODUCT (SOBIA-BID PROBIOTIC PO)    Take 1 tablet by mouth daily     SENNA (SENOKOT) 8.6 MG TABLET    Take 1 tablet by mouth 2 times daily    SITAGLIPTAN-METFORMIN (JANUMET)  MG PER TABLET    Take 1 tablet by mouth 2 times daily (with meals)     TAMSULOSIN (FLOMAX) 0.4 MG CAPSULE    Take 0.4 mg by mouth nightly     VITAMIN C (ASCORBIC ACID) 500 MG TABLET    Take 2 tablets by mouth daily       ALLERGIES     Penicillins    FAMILY HISTORY     He indicated that his mother is . He indicated that his father is alive. He indicated that his brother is alive.  He indicated that his daughter is alive. He indicated that both of his sons are alive. He indicated that the status of his paternal aunt is unknown.   family history includes Cancer in his paternal aunt; Heart Disease in his mother. SOCIAL HISTORY      reports that he has never smoked. He has never used smokeless tobacco. He reports previous alcohol use. He reports that he does not use drugs. PHYSICAL EXAM      height is 5' 8\" (1.727 m) and weight is 280 lb (127 kg). His oral temperature is 99 °F (37.2 °C). His blood pressure is 132/92 (abnormal) and his pulse is 70. His respiration is 16 and oxygen saturation is 92%. Physical Exam  Vitals and nursing note reviewed. Constitutional:       Appearance: He is well-developed. He is not diaphoretic. HENT:      Head: Normocephalic and atraumatic. Nose: Nose normal.   Eyes:      General: No scleral icterus. Right eye: No discharge. Left eye: No discharge. Conjunctiva/sclera: Conjunctivae normal.      Pupils: Pupils are equal, round, and reactive to light. Neck:      Vascular: No JVD. Trachea: No tracheal deviation. Cardiovascular:      Rate and Rhythm: Normal rate and regular rhythm. Heart sounds: Normal heart sounds. No murmur heard. No friction rub. No gallop. Pulmonary:      Effort: Pulmonary effort is normal. No respiratory distress. Breath sounds: Normal breath sounds. No stridor. No wheezing or rales. Chest:      Chest wall: Tenderness present. Abdominal:      General: Bowel sounds are normal. There is no distension. Palpations: Abdomen is soft. There is no mass. Tenderness: There is abdominal tenderness in the epigastric area. There is no guarding or rebound. Hernia: No hernia is present. Musculoskeletal:         General: Swelling and tenderness present. No deformity. Cervical back: Normal range of motion and neck supple.       Comments: Diffuse tenderness to right mid thigh, right tibia-fibula area and right mg/dL    CREATININE 1.3 (H) 0.4 - 1.2 mg/dL    BUN 25 (H) 7 - 22 mg/dL    Sodium 141 135 - 145 meq/L    Potassium 4.7 3.5 - 5.2 meq/L    Chloride 103 98 - 111 meq/L    CO2 26 23 - 33 meq/L    Calcium 9.6 8.5 - 10.5 mg/dL    AST 30 5 - 40 U/L    Alkaline Phosphatase 175 (H) 38 - 126 U/L    Total Protein 6.5 6.1 - 8.0 g/dL    Albumin 4.2 3.5 - 5.1 g/dL    Total Bilirubin 0.4 0.3 - 1.2 mg/dL    ALT 28 11 - 66 U/L   Lipase   Result Value Ref Range    Lipase 47.7 5.6 - 51.3 U/L   Troponin   Result Value Ref Range    Troponin T 0.015 (A) ng/ml   Ethanol   Result Value Ref Range    ETHYL ALCOHOL, SERUM < 0.01 0.00 %   Ammonia   Result Value Ref Range    Ammonia 60 11 - 60 umol/L   CK   Result Value Ref Range    Total  (H) 55 - 170 U/L   Anion Gap   Result Value Ref Range    Anion Gap 12.0 8.0 - 16.0 meq/L   Glomerular Filtration Rate, Estimated   Result Value Ref Range    Est, Glom Filt Rate 70 (A) ml/min/1.73m2   Osmolality   Result Value Ref Range    Osmolality Calc 288.9 275.0 - 300.0 mOsmol/kg   Troponin   Result Value Ref Range    Troponin T < 0.010 ng/ml   EKG Emergency   Result Value Ref Range    Ventricular Rate 64 BPM    Atrial Rate 64 BPM    P-R Interval 166 ms    QRS Duration 82 ms    Q-T Interval 420 ms    QTc Calculation (Bazett) 433 ms    P Axis 150 degrees    R Axis -148 degrees    T Axis -170 degrees       RADIOLOGY REPORTS  CT CHEST W CONTRAST   Final Result   1. Dependent atelectasis is noted at the lung bases. A few scattered patchy groundglass opacities are nonspecific and could be infectious/inflammatory. 2. No solid organ injury. No acute fracture. Chronic arthritic changes are discussed. **This report has been created using voice recognition software. It may contain minor errors which are inherent in voice recognition technology. **      Final report electronically signed by Dr Juvenal Orantes on 1/5/2022 11:16 AM      CT ABDOMEN PELVIS W IV CONTRAST Additional Contrast? None Final Result   1. Dependent atelectasis is noted at the lung bases. A few scattered patchy groundglass opacities are nonspecific and could be infectious/inflammatory. 2. No solid organ injury. No acute fracture. Chronic arthritic changes are discussed. **This report has been created using voice recognition software. It may contain minor errors which are inherent in voice recognition technology. **      Final report electronically signed by Dr Rajinder De La Paz on 1/5/2022 11:16 AM      CT HEAD WO CONTRAST   Final Result    No evidence of acute intracranial abnormality. **This report has been created using voice recognition software. It may contain minor errors which are inherent in voice recognition technology. **      Final report electronically signed by Dr. Carlos Gan MD on 1/5/2022 11:06 AM      CT CERVICAL SPINE WO CONTRAST   Final Result   No acute fracture or subluxation in the cervical spine. **This report has been created using voice recognition software. It may contain minor errors which are inherent in voice recognition technology. **      Final report electronically signed by Dr Cait Wright on 1/5/2022 11:01 AM      XR TIBIA FIBULA RIGHT (2 VIEWS)   Final Result   No acute osseous abnormality            **This report has been created using voice recognition software. It may contain minor errors which are inherent in voice recognition technology. **      Final report electronically signed by Dr. Nash Green on 1/5/2022 9:54 AM      XR FOOT RIGHT (MIN 3 VIEWS)   Final Result   1. No acute fracture is seen. 2. Degenerative changes of the right foot. 3. Extensive soft tissue calcification relating to dermatomyositis. 4. Moderate soft tissue swelling is seen. 5. Achilles tendinopathy. **This report has been created using voice recognition software. It may contain minor errors which are inherent in voice recognition technology. ** Final report electronically signed by Dr Fredo Peñaloza on 1/5/2022 9:53 AM      XR FEMUR RIGHT (MIN 2 VIEWS)   Final Result   No acute process            **This report has been created using voice recognition software. It may contain minor errors which are inherent in voice recognition technology. **      Final report electronically signed by Dr. Estefany Rm on 1/5/2022 9:46 AM          ED COURSE AND MEDICAL [facility] [id number] ([facility])     (7:18 AM EST)INITIAL ASSESSMENT AND PLAN   Differential Diagnosis: Fall, contusion, ruling out internal injuries such as ICH, cervical spine injury, intrathoracic injury, or intra-abdominal injuries. Plan:   Large bore IV  Labs Reviewed   CBC WITH AUTO DIFFERENTIAL   COMPREHENSIVE METABOLIC PANEL   LIPASE   TROPONIN   ETHANOL   AMMONIA   CK     XR FOOT RIGHT (MIN 3 VIEWS)    (Results Pending)   XR TIBIA FIBULA RIGHT (2 VIEWS)    (Results Pending)   CT CHEST W CONTRAST    (Results Pending)   CT ABDOMEN PELVIS W IV CONTRAST Additional Contrast? None    (Results Pending)   CT HEAD WO CONTRAST    (Results Pending)   CT CERVICAL SPINE WO CONTRAST    (Results Pending)   XR FEMUR RIGHT (MIN 2 VIEWS)    (Results Pending)       [facility]    Workups  are negative. He states he cannot walk now (gradually loosing strength) and this has been ongoing for past month but got worse. He is on Eliquis, safety became an issue and admission is warranted. Discussed with  for SNF placement. However, later 's note reflected that she can not see my H&P from [address] [place] [address] Rd (However she did not call me or ED for an update nor did she come down to ED). Discussed with Dr. Alex Crespo and plan to admit to Hospitalist service.      Patient received following medications in ED  Medications   0.9 % sodium chloride infusion ( IntraVENous New Bag 1/5/22 3949)   iopamidol (ISOVUE-370) 76 % injection 80 mL (80 mLs IntraVENous Given 1/5/22 1035)       Vitals:    01/05/22 2001 01/05/22 9794 01/05/22 1548 BP: (!) 161/102 (!) 126/94 (!) 132/92   Pulse: 73 63 70   Resp: 18 17 16   Temp: 99 °F (37.2 °C)     TempSrc: Oral     SpO2: 92%  92%   Weight: 280 lb (127 kg)     Height: 5' 8\" (1.727 m)         CRITICAL CARE   None    CONSULTS   Dr. Tiffanie Slater      1. Frequent falls    2. Inability to walk    3. Contusion of chest wall, unspecified laterality, initial encounter        DISPOSITION Admitted 01/05/2022 05:46:10 PM    PATIENT REFERRED TO:  No follow-up provider specified.     DISCHARGE MEDICATIONS:  New Prescriptions    No medications on file       (Please note that portions of this note were completed with a voice recognition program.  Efforts were made to edit the dictations but occasionally words aremis-transcribed.)    MD Ryann Rojas MD  01/05/22 7296

## 2022-01-05 NOTE — CARE COORDINATION
1/5/22, 5:11 PM EST    DISCHARGE PLANNING EVALUATION    Was asked to help place Nathaniel Skelton in an ECF tonight. Called nurse Mary Petty to make her aware we can't place him until we have a note from the physician at minimum. Will check in the morning and help with placement at that time.

## 2022-01-05 NOTE — ED NOTES
Pt resting on cot with eyes closed at this time. IV accessed. Call light in reach.      Silvia Cadet RN  01/05/22 3244

## 2022-01-05 NOTE — H&P
Hospitalist - History & Physical      Patient: Anabelle Park    Unit/Bed:08/008A  YOB: 1970  MRN: [id number]   Acct: [de-identified]   PCP: SANDRA Palacios CNP    Date of Service: Pt seen/examined on 01/05/22  and Admitted to [Observation] with expected LOS [less than] two midnights due to medical therapy. Chief Complaint:  Abdominal pain and right leg pain following a fall. Assessment and Plan:-  1. Physical deconditioning s/p fall  2. Chronic RLE  a. Patient suffered a fall while at home stating that he tripped and fell onto his right knee and abdomen onto the floor. Denies any loss of consciousness. b. Imaging negative for any acute fractures or pathology. c. PT/OT evaluation. d. Patient feels that he would be better suited for inpatient rehab for period of time. Case management consult to assist.  3. DMT2  a. Basal and SSI. Holding oral antihypoglycemic agents  b. Diabetic diet  4. pAF  a. Continue home Eliquis. b. Patient is currently rate controlled. Does not appear to be on any AV harshad blockade. 5. HTN  a. It does not appear the patient is currently filling his home hydralazine medication. If blood pressure is elevated requiring treatment, will likely start calcium channel blocker or ACE inhibitor/ARB given his diabetes. 6. Rheumatoid arthritis  a. States he follows with a rheumatologist and has previously been on infusion mediations  b. Currently prescribed Norco.  Will continue for pain control at this time. Disposition: Potential discharge to inpatient rehab versus home with home health services    History Of Present Illness:    Patient is a pleasant 75-year-old male with stated medical history of hypertension diabetes who presents the hospital after a fall at home. The patient states that he tripped and fell onto his right knee and abdomen. He has been having pain in the right leg and his abdomen since that time.   He denies any prodromal symptoms including lightheadedness, dizziness, chest pain, palpitations, or vertigo. Patient states that he has been having a \"bad nerve\" in his left leg with inability to move the leg since being diagnosed and treated for Covid in September 2020. He does ambulate with a cane at baseline. He denies having any recent illnesses including fevers, chills, shortness of breath, cough, diarrhea, or constipation. While in the ER, the patient had a fairly extensive work-up including multiple CT scans and identified no acute fractures. Patient thinks that he would be better suited from a [facility] a nursing facility or rehab facility prior to returning home.       Past Medical History:        Diagnosis Date    Aortic stenosis, mild to moderate     BPH (benign prostatic hyperplasia)     Carpal tunnel syndrome on right     rt hand    Chronic gout     COVID-19 09/2020    DM2 (diabetes mellitus, type 2) ([address]     Dyslipidemia     GERD (gastroesophageal reflux disease)     Heart failure with preserved ejection fraction (HCC)     History of alcohol abuse     History of atrial fibrillation     History of osteomyelitis     Hx of R foot wound, osteomyelitis July 2018 requiring surgery and IV Vanco    Hyperlipidemia     Hypertension, essential     Hypogonadism, male     Major depression     Mood disorder ([address]     Morbid obesity ([address]     DURAN (nonalcoholic steatohepatitis)     KIARA treated with BiPAP     Vitamin D deficiency        Past Surgical History:        Procedure Laterality Date    COLONOSCOPY N/A 11/15/2020    COLONOSCOPY DIAGNOSTIC performed by Dano Gracia MD at [facility]    ENDOSCOPY, COLON, DIAGNOSTIC      FOOT SURGERY Right     2018, R foot osteomyelitis    HIP SURGERY Left 6/29/2020    bilateral hip steroid injection, Left HIP FIRST performed by Jessie Magana MD at [place] [address] 10/26/2020    SIGMOIDOSCOPY DIAGNOSTIC FLEXIBLE performed by Chris Giles MD at [facility]      as a baby    UPPER GASTROINTESTINAL ENDOSCOPY N/A 11/14/2020    EGD DIAGNOSTIC ONLY performed by Pierre Barone MD at [facility] ENDOSCOPY N/A 12/27/2021    EGD performed by Pierre Barone MD at [facility]       Home Medications:   No current facility-administered medications on file prior to encounter. Current Outpatient Medications on File Prior to Encounter   Medication Sig Dispense Refill    hydrALAZINE (APRESOLINE) 50 MG tablet Take 1 tablet by mouth 3 times daily 90 tablet 7    pantoprazole (PROTONIX) 40 MG tablet Take 1 tablet by mouth 2 times daily (before meals) 60 tablet 5    pregabalin (LYRICA) 50 MG capsule Take 50 mg by mouth 3 times daily.  clotrimazole-betamethasone (LOTRISONE) 1-0.05 % cream Apply topically 2 times daily.  1 each 0    potassium chloride (KLOR-CON M) 20 MEQ extended release tablet Take 1 tablet by mouth daily 30 tablet 2    apixaban (ELIQUIS) 5 MG TABS tablet Take 10mg by mouth two times daily for 6 days followed by 5mg by mouth two times daily 60 tablet 1    glycopyrrolate-formoterol (BEVESPI) 9-4.8 MCG/ACT AERO Inhale 2 puffs into the lungs 2 times daily 10.7 g 1    ferrous sulfate (IRON 325) 325 (65 Fe) MG tablet Take 1 tablet by mouth 2 times daily (with meals) 30 tablet 3    albuterol sulfate HFA (PROVENTIL HFA) 108 (90 Base) MCG/ACT inhaler Inhale 2 puffs into the lungs every 6 hours as needed for Wheezing 1 Inhaler 3    vitamin C (ASCORBIC ACID) 500 MG tablet Take 2 tablets by mouth daily 30 tablet 0    CHOLECALCIFEROL PO Take 2,000 Units by mouth daily      atorvastatin (LIPITOR) 40 MG tablet Take 40 mg by mouth nightly      acetaminophen (TYLENOL) 325 MG tablet Take 650 mg by mouth every 4 hours as needed for Pain      busPIRone (BUSPAR) 10 MG tablet Take 10 mg by mouth 2 times daily       allopurinol (ZYLOPRIM) 300 MG tablet Take 1 tablet by mouth daily 90 tablet 0    Multiple Vitamins-Minerals (THERAPEUTIC MULTIVITAMIN-MINERALS) tablet Take 1 tablet by mouth daily      Probiotic Product (SOBIA-BID PROBIOTIC PO) Take 1 tablet by mouth daily       ondansetron (ZOFRAN) 4 MG tablet Take 4 mg by mouth every 8 hours as needed for Nausea or Vomiting      tamsulosin (FLOMAX) 0.4 MG capsule Take 0.4 mg by mouth nightly       folic acid (FOLVITE) 1 MG tablet Take 1 mg by mouth daily      furosemide (LASIX) 40 MG tablet Take 40 mg by mouth daily       sitaGLIPtan-metformin (JANUMET)  MG per tablet Take 1 tablet by mouth 2 times daily (with meals)       senna (SENOKOT) 8.6 MG tablet Take 1 tablet by mouth 2 times daily      ARIPiprazole (ABILIFY PO) Take 15 mg by mouth daily       Citalopram Hydrobromide (CELEXA PO) Take 30 mg by mouth daily From [facility] professional services       Blood Glucose Monitoring Suppl (FREESTYLE LITE) ARTIE Patient needs all supplies for qd testing. DX: 250.00 1 Device 11       Allergies:    Penicillins    Social History:    reports that he has never smoked. He has never used smokeless tobacco. He reports previous alcohol use. He reports that he does not use drugs. Family History:       Problem Relation Age of Onset    Heart Disease Mother         MI    Cancer Paternal Aunt         lung       Diet:  ADULT DIET; Regular; 4 carb choices (60 gm/meal)    Review of systems:   Pertinent positives as noted in the HPI. All other systems reviewed and negative. PHYSICAL EXAM:  BP (!) 150/108   Pulse 69   Temp 99 °F (37.2 °C) (Oral)   Resp 17   Ht 5' 8\" (1.727 m)   Wt 280 lb (127 kg)   SpO2 92%   BMI 42.57 kg/m²   General appearance: No apparent distress, appears stated age and cooperative. Chronically ill-appearing but in no acute distress. HEENT: Normal cephalic, atraumatic without obvious deformity. Pupils equal, round, and reactive to light. Extra ocular muscles intact. Conjunctivae/corneas clear.   Neck: Supple, with full range of motion. No jugular venous distention. Trachea midline. Respiratory:  Normal respiratory effort. Clear to auscultation, bilaterally without Rales/Wheezes/Rhonchi. Cardiovascular: Regular rate and rhythm with normal S1/S2 without murmurs, rubs or gallops. Abdomen: Soft, non-tender, non-distended with normal bowel sounds. Musculoskeletal:  No clubbing, cyanosis or edema bilaterally. Skin: Dry and scaling skin noted to the bilateral lower extremities. No open skin lesions are appreciated. Neurologic: Speech is clear and appropriate. Patient does have chronic weakness with inability to move his left distal lower extremity. Muscle strength testing of the left ankle 0-5 with plantar flexion dorsiflexion. Psychiatric: Alert and oriented, thought content appropriate, normal insight  Capillary Refill: Brisk,< 3 seconds   Peripheral Pulses: +2 palpable, equal bilaterally     Labs:   Recent Labs     01/05/22 0912   WBC 10.3   HGB 15.7   HCT 51.1        Recent Labs     01/05/22 0912      K 4.7      CO2 26   BUN 25*   CREATININE 1.3*   CALCIUM 9.6     Recent Labs     01/05/22  0912   AST 30   ALT 28   BILITOT 0.4   ALKPHOS 175*     No results for input(s): INR in the last 72 hours. Recent Labs     01/05/22  0912   CKTOTAL 201*       Urinalysis:    Lab Results   Component Value Date    NITRU NEGATIVE 10/22/2021    WBCUA 0-2 10/22/2021    BACTERIA NONE SEEN 10/22/2021    RBCUA 0-2 10/22/2021    BLOODU TRACE 10/22/2021    SPECGRAV >=1.030 10/06/2020    GLUCOSEU 500 10/22/2021       Radiology:   CT CHEST W CONTRAST   Final Result   1. Dependent atelectasis is noted at the lung bases. A few scattered patchy groundglass opacities are nonspecific and could be infectious/inflammatory. 2. No solid organ injury. No acute fracture. Chronic arthritic changes are discussed. **This report has been created using voice recognition software.   It may contain minor errors which are inherent in voice recognition technology. **      Final report electronically signed by Dr Dion Albright on 1/5/2022 11:16 AM      CT ABDOMEN PELVIS W IV CONTRAST Additional Contrast? None   Final Result   1. Dependent atelectasis is noted at the lung bases. A few scattered patchy groundglass opacities are nonspecific and could be infectious/inflammatory. 2. No solid organ injury. No acute fracture. Chronic arthritic changes are discussed. **This report has been created using voice recognition software. It may contain minor errors which are inherent in voice recognition technology. **      Final report electronically signed by Dr Dion Albright on 1/5/2022 11:16 AM      CT HEAD WO CONTRAST   Final Result    No evidence of acute intracranial abnormality. **This report has been created using voice recognition software. It may contain minor errors which are inherent in voice recognition technology. **      Final report electronically signed by Dr. Terry Hood MD on 1/5/2022 11:06 AM      CT CERVICAL SPINE WO CONTRAST   Final Result   No acute fracture or subluxation in the cervical spine. **This report has been created using voice recognition software. It may contain minor errors which are inherent in voice recognition technology. **      Final report electronically signed by Dr Valentin Puri on 1/5/2022 11:01 AM      XR TIBIA FIBULA RIGHT (2 VIEWS)   Final Result   No acute osseous abnormality            **This report has been created using voice recognition software. It may contain minor errors which are inherent in voice recognition technology. **      Final report electronically signed by Dr. Juan Luis Dubose on 1/5/2022 9:54 AM      XR FOOT RIGHT (MIN 3 VIEWS)   Final Result   1. No acute fracture is seen. 2. Degenerative changes of the right foot. 3. Extensive soft tissue calcification relating to dermatomyositis. 4. Moderate soft tissue swelling is seen.    5. Achilles tendinopathy. **This report has been created using voice recognition software. It may contain minor errors which are inherent in voice recognition technology. **      Final report electronically signed by Dr Clyde Woodward on 1/5/2022 9:53 AM      XR FEMUR RIGHT (MIN 2 VIEWS)   Final Result   No acute process            **This report has been created using voice recognition software. It may contain minor errors which are inherent in voice recognition technology. **      Final report electronically signed by Dr. Rizwan Mcdaniel on 1/5/2022 9:46 AM        XR FEMUR RIGHT (MIN 2 VIEWS)    Result Date: 1/5/2022  PROCEDURE: XR FEMUR RIGHT (MIN 2 VIEWS) CLINICAL INFORMATION: fall, pain . TECHNIQUE: AP and crosstable lateral projections COMPARISON: No prior study. FINDINGS: No acute fracture or bone destruction. Heterotopic ossification right hip. Severe degenerative change right knee. No abnormal calcifications throughout the lower extremity. Numerous clothing artifacts over the pelvis. No acute process **This report has been created using voice recognition software. It may contain minor errors which are inherent in voice recognition technology. ** Final report electronically signed by Dr. Rizwan Mcdaniel on 1/5/2022 9:46 AM    XR TIBIA FIBULA RIGHT (2 VIEWS)    Result Date: 1/5/2022  PROCEDURE: XR TIBIA FIBULA RIGHT (2 VIEWS) CLINICAL INFORMATION: fall, pain . TECHNIQUE: AP and lateral COMPARISON: 12/30/2021 FINDINGS: No acute fracture or dislocation. Degenerative changes of the ankle marked calcaneal spurring. Degenerative changes knee. Extensive skin calcifications apparently from dermatomyositis by clinical history. No acute osseous abnormality **This report has been created using voice recognition software. It may contain minor errors which are inherent in voice recognition technology. ** Final report electronically signed by Dr. Rizwan Mcdaniel on 1/5/2022 9:54 AM    XR FOOT RIGHT (MIN 3 VIEWS)    Result Date: 1/5/2022  PROCEDURE: XR FOOT RIGHT (MIN 3 VIEWS) CLINICAL INFORMATION: fall, pain COMPARISON: Right foot radiograph 10/15/2019 TECHNIQUE: 4 views of the right foot FINDINGS: Again seen is diffuse soft tissue calcifications in the visualized lower leg and foot. Soft tissue swelling is seen in the foot. Mild hallux valgus deformity. Mild to moderate degenerative changes of the right foot. There is ossified body is related to the Achilles tendon that likely reflect underlying tendinopathy. A 1.7 cm plantar calcaneal spur is seen. Again seen is the ossification along the lateral aspect of the first metatarsal head and neck No acute fracture or dislocation. The bones appear somewhat demineralized. 1. No acute fracture is seen. 2. Degenerative changes of the right foot. 3. Extensive soft tissue calcification relating to dermatomyositis. 4. Moderate soft tissue swelling is seen. 5. Achilles tendinopathy. **This report has been created using voice recognition software. It may contain minor errors which are inherent in voice recognition technology. ** Final report electronically signed by Dr Keagan Luna on 1/5/2022 9:53 AM    CT HEAD WO CONTRAST    Result Date: 1/5/2022  PROCEDURE: CT HEAD WO CONTRAST CLINICAL INFORMATION: fall. COMPARISON: 12/30/2021. TECHNIQUE: Noncontrast 5 mm axial images were obtained through the brain. Sagittal and coronal reconstructions were created. All CT scans at this facility use dose modulation, iterative reconstruction, and/or weight-based dosing when appropriate to reduce radiation dose to as low as reasonably achievable. FINDINGS: Ventricles, cisterns and sulci are symmetric and normal in size and configuration. Gray-white matter projection appears grossly preserved. No intracranial hemorrhage, mass effect or midline shift is identified. The calvarium appears intact. There is stable bilateral proptosis possibly on the basis of thyroid ophthalmopathy.  Orbits are otherwise unremarkable. There are nasal sinuses and mastoid air cells are clear. Redemonstration of diffuse punctate calcifications in the scalp and nuchal tissues of the neck, unchanged compared to prior exam.      No evidence of acute intracranial abnormality. **This report has been created using voice recognition software. It may contain minor errors which are inherent in voice recognition technology. ** Final report electronically signed by Dr. Paulette Velazquez MD on 1/5/2022 11:06 AM    CT CHEST W CONTRAST    Result Date: 1/5/2022  PROCEDURE: CT ABDOMEN PELVIS W IV CONTRAST, CT CHEST W CONTRAST CLINICAL INFORMATION: Fall. Epigastric abdominal pain. COMPARISON: None TECHNIQUE: Axial 5 mm CT images were obtained through the chest, abdomen and pelvis after the administration of 80  cc Isovue 370 intravenous contrast. Coronal and sagittal reconstructions were obtained. All CT scans at this facility use dose modulation, iterative reconstruction, and/or weight-based dosing when appropriate to reduce radiation dose to as low as reasonably achievable. FINDINGS: Heart/mediastinum: The heart size is normal. No pericardial effusion is observed. The aorta is not dilated. No aortic aneurysm or dissection is present. No mediastinal, hilar, or axillary lymphadenopathy is observed. Lungs: Atelectasis is noted at the lung bases. No focal consolidation, pleural effusion, or pneumothorax is identified. A few scattered patchy groundglass opacities are nonspecific. A calcified granuloma in the right lower lobe is unchanged. Liver/gallbladder/bilary tree: The liver remains diffusely low in attenuation suggesting fatty infiltration. No radiopaque gallstones or biliary ductal dilatation is observed. Pancreas: Normal. Spleen : Normal. Adrenal glands: Normal. Kidneys/ ureters/ bladder: No renal calculus, hydronephrosis, or hydroureter is present. The urinary bladder is unremarkable.  Gastrointestinal:  Mild residual fecal material seen throughout the colon. No bowel obstruction, free fluid, fluid collection, or free air is observed. A small fat-containing umbilical hernia is unchanged. The appendix is normal. Retroperitoneum / lymph nodes: The aorta is not dilated. No lymphadenopathy is observed. Pelvis: The prostate gland is not enlarged. Musculoskeletal: Chronic arthritic changes are noted in both shoulders and both hips. Multilevel degenerative disc disease is noted in the thoracic and lumbar spine. No fracture is observed. Diffuse soft tissue calcifications in the paraspinal soft tissues as well as the soft tissues of the pelvis appear unchanged. 1. Dependent atelectasis is noted at the lung bases. A few scattered patchy groundglass opacities are nonspecific and could be infectious/inflammatory. 2. No solid organ injury. No acute fracture. Chronic arthritic changes are discussed. **This report has been created using voice recognition software. It may contain minor errors which are inherent in voice recognition technology. ** Final report electronically signed by Dr Thanh Boogie on 1/5/2022 11:16 AM    CT CERVICAL SPINE WO CONTRAST    Result Date: 1/5/2022  PROCEDURE: CT CERVICAL SPINE WO CONTRAST CLINICAL INFORMATION: 49-year-old male who fell yesterday. Trauma. COMPARISON: Correlation is made with CT of the soft tissues of the neck dated 12/5/2021. TECHNIQUE: 3 mm noncontrast axial images were obtained through the cervical spine with sagittal and coronal reconstructions. All CT scans at this facility use dose modulation, iterative reconstruction, and/or weight-based dosing when appropriate to reduce radiation dose to as low as reasonably achievable. FINDINGS: Motion artifact somewhat limits evaluation in the mid and lower cervical spine. Innumerable scattered skin and soft tissue calcifications are again noted, most prominent posteriorly near the base of the skull.  This is similar to the prior exam. The cervical vertebral body heights and alignment are preserved. There is no acute compression fracture. There is no subluxation. There is some disc space narrowing. There are are some mild areas of anterior endplate spurring. There is no prevertebral soft tissue swelling. There are no suspicious findings in the visualized lung apices. The atlantodental intervals within normal limits. The lateral masses of C1 and C2 are appropriately aligned. No acute fracture or subluxation in the cervical spine. **This report has been created using voice recognition software. It may contain minor errors which are inherent in voice recognition technology. ** Final report electronically signed by Dr Sveta Mullins on 1/5/2022 11:01 AM    CT ABDOMEN PELVIS W IV CONTRAST Additional Contrast? None    Result Date: 1/5/2022  PROCEDURE: CT ABDOMEN PELVIS W IV CONTRAST, CT CHEST W CONTRAST CLINICAL INFORMATION: Fall. Epigastric abdominal pain. COMPARISON: None TECHNIQUE: Axial 5 mm CT images were obtained through the chest, abdomen and pelvis after the administration of 80  cc Isovue 370 intravenous contrast. Coronal and sagittal reconstructions were obtained. All CT scans at this facility use dose modulation, iterative reconstruction, and/or weight-based dosing when appropriate to reduce radiation dose to as low as reasonably achievable. FINDINGS: Heart/mediastinum: The heart size is normal. No pericardial effusion is observed. The aorta is not dilated. No aortic aneurysm or dissection is present. No mediastinal, hilar, or axillary lymphadenopathy is observed. Lungs: Atelectasis is noted at the lung bases. No focal consolidation, pleural effusion, or pneumothorax is identified. A few scattered patchy groundglass opacities are nonspecific. A calcified granuloma in the right lower lobe is unchanged. Liver/gallbladder/bilary tree: The liver remains diffusely low in attenuation suggesting fatty infiltration.  No radiopaque gallstones or biliary ductal dilatation is observed. Pancreas: Normal. Spleen : Normal. Adrenal glands: Normal. Kidneys/ ureters/ bladder: No renal calculus, hydronephrosis, or hydroureter is present. The urinary bladder is unremarkable. Gastrointestinal:  Mild residual fecal material seen throughout the colon. No bowel obstruction, free fluid, fluid collection, or free air is observed. A small fat-containing umbilical hernia is unchanged. The appendix is normal. Retroperitoneum / lymph nodes: The aorta is not dilated. No lymphadenopathy is observed. Pelvis: The prostate gland is not enlarged. Musculoskeletal: Chronic arthritic changes are noted in both shoulders and both hips. Multilevel degenerative disc disease is noted in the thoracic and lumbar spine. No fracture is observed. Diffuse soft tissue calcifications in the paraspinal soft tissues as well as the soft tissues of the pelvis appear unchanged. 1. Dependent atelectasis is noted at the lung bases. A few scattered patchy groundglass opacities are nonspecific and could be infectious/inflammatory. 2. No solid organ injury. No acute fracture. Chronic arthritic changes are discussed. **This report has been created using voice recognition software. It may contain minor errors which are inherent in voice recognition technology. ** Final report electronically signed by Dr Ilene Estrada on 1/5/2022 11:16 AM    Electronically signed by Eri Cash DO on 1/5/2022 at 6:46 PM

## 2022-01-06 VITALS
DIASTOLIC BLOOD PRESSURE: 92 MMHG | SYSTOLIC BLOOD PRESSURE: 145 MMHG | HEIGHT: 68 IN | HEART RATE: 64 BPM | WEIGHT: 280 LBS | OXYGEN SATURATION: 93 % | TEMPERATURE: 98.3 F | RESPIRATION RATE: 16 BRPM | BODY MASS INDEX: 42.44 KG/M2

## 2022-01-06 LAB
ANION GAP SERPL CALCULATED.3IONS-SCNC: 11 MEQ/L (ref 8–16)
BUN BLDV-MCNC: 22 MG/DL (ref 7–22)
CALCIUM SERPL-MCNC: 9.3 MG/DL (ref 8.5–10.5)
CHLORIDE BLD-SCNC: 103 MEQ/L (ref 98–111)
CO2: 27 MEQ/L (ref 23–33)
CREAT SERPL-MCNC: 1.1 MG/DL (ref 0.4–1.2)
ERYTHROCYTE [DISTWIDTH] IN BLOOD BY AUTOMATED COUNT: 14.2 % (ref 11.5–14.5)
ERYTHROCYTE [DISTWIDTH] IN BLOOD BY AUTOMATED COUNT: 49.7 FL (ref 35–45)
GFR SERPL CREATININE-BSD FRML MDRD: 85 ML/MIN/1.73M2
GLUCOSE BLD-MCNC: 164 MG/DL (ref 70–108)
GLUCOSE BLD-MCNC: 179 MG/DL (ref 70–108)
GLUCOSE BLD-MCNC: 181 MG/DL (ref 70–108)
GLUCOSE BLD-MCNC: 186 MG/DL (ref 70–108)
HCT VFR BLD CALC: 48.6 % (ref 42–52)
HEMOGLOBIN: 15.4 GM/DL (ref 14–18)
MCH RBC QN AUTO: 30 PG (ref 26–33)
MCHC RBC AUTO-ENTMCNC: 31.7 GM/DL (ref 32.2–35.5)
MCV RBC AUTO: 94.6 FL (ref 80–94)
PLATELET # BLD: 193 THOU/MM3 (ref 130–400)
PMV BLD AUTO: 11.9 FL (ref 9.4–12.4)
POTASSIUM SERPL-SCNC: 4.2 MEQ/L (ref 3.5–5.2)
RBC # BLD: 5.14 MILL/MM3 (ref 4.7–6.1)
SODIUM BLD-SCNC: 141 MEQ/L (ref 135–145)
WBC # BLD: 9.4 THOU/MM3 (ref 4.8–10.8)

## 2022-01-06 PROCEDURE — 80048 BASIC METABOLIC PNL TOTAL CA: CPT

## 2022-01-06 PROCEDURE — 90686 IIV4 VACC NO PRSV 0.5 ML IM: CPT | Performed by: STUDENT IN AN ORGANIZED HEALTH CARE EDUCATION/TRAINING PROGRAM

## 2022-01-06 PROCEDURE — 36415 COLL VENOUS BLD VENIPUNCTURE: CPT

## 2022-01-06 PROCEDURE — G0378 HOSPITAL OBSERVATION PER HR: HCPCS

## 2022-01-06 PROCEDURE — 82948 REAGENT STRIP/BLOOD GLUCOSE: CPT

## 2022-01-06 PROCEDURE — G0008 ADMIN INFLUENZA VIRUS VAC: HCPCS | Performed by: STUDENT IN AN ORGANIZED HEALTH CARE EDUCATION/TRAINING PROGRAM

## 2022-01-06 PROCEDURE — 97110 THERAPEUTIC EXERCISES: CPT

## 2022-01-06 PROCEDURE — 2580000003 HC RX 258: Performed by: STUDENT IN AN ORGANIZED HEALTH CARE EDUCATION/TRAINING PROGRAM

## 2022-01-06 PROCEDURE — 97165 OT EVAL LOW COMPLEX 30 MIN: CPT

## 2022-01-06 PROCEDURE — 6360000002 HC RX W HCPCS: Performed by: STUDENT IN AN ORGANIZED HEALTH CARE EDUCATION/TRAINING PROGRAM

## 2022-01-06 PROCEDURE — 99217 PR OBSERVATION CARE DISCHARGE MANAGEMENT: CPT | Performed by: STUDENT IN AN ORGANIZED HEALTH CARE EDUCATION/TRAINING PROGRAM

## 2022-01-06 PROCEDURE — 6370000000 HC RX 637 (ALT 250 FOR IP): Performed by: STUDENT IN AN ORGANIZED HEALTH CARE EDUCATION/TRAINING PROGRAM

## 2022-01-06 PROCEDURE — 85027 COMPLETE CBC AUTOMATED: CPT

## 2022-01-06 RX ADMIN — ARIPIPRAZOLE 15 MG: 15 TABLET ORAL at 10:32

## 2022-01-06 RX ADMIN — PREGABALIN 50 MG: 50 CAPSULE ORAL at 14:21

## 2022-01-06 RX ADMIN — CITALOPRAM HYDROBROMIDE 30 MG: 20 TABLET ORAL at 10:32

## 2022-01-06 RX ADMIN — FUROSEMIDE 40 MG: 40 TABLET ORAL at 10:32

## 2022-01-06 RX ADMIN — INFLUENZA A VIRUS A/VICTORIA/2570/2019 IVR-215 (H1N1) ANTIGEN (PROPIOLACTONE INACTIVATED), INFLUENZA A VIRUS A/CAMBODIA/E0826360/2020 IVR-224 (H3N2) ANTIGEN (PROPIOLACTONE INACTIVATED), INFLUENZA B VIRUS B/VICTORIA/705/2018 BVR-11 ANTIGEN (PROPIOLACTONE INACTIVATED), INFLUENZA B VIRUS B/PHUKET/3073/2013 BVR-1B ANTIGEN (PROPIOLACTONE INACTIVATED) 0.5 ML: 15; 15; 15; 15 INJECTION, SUSPENSION INTRAMUSCULAR at 17:46

## 2022-01-06 RX ADMIN — PREGABALIN 50 MG: 50 CAPSULE ORAL at 10:31

## 2022-01-06 RX ADMIN — ALLOPURINOL 300 MG: 300 TABLET ORAL at 10:33

## 2022-01-06 RX ADMIN — APIXABAN 5 MG: 5 TABLET, FILM COATED ORAL at 10:32

## 2022-01-06 RX ADMIN — SODIUM CHLORIDE, PRESERVATIVE FREE 10 ML: 5 INJECTION INTRAVENOUS at 10:33

## 2022-01-06 RX ADMIN — BUSPIRONE HYDROCHLORIDE 10 MG: 10 TABLET ORAL at 10:31

## 2022-01-06 ASSESSMENT — PAIN SCALES - GENERAL
PAINLEVEL_OUTOF10: 7
PAINLEVEL_OUTOF10: 7

## 2022-01-06 ASSESSMENT — PAIN DESCRIPTION - LOCATION: LOCATION: LEG;ABDOMEN

## 2022-01-06 NOTE — CARE COORDINATION
1/6/22, 9:29 AM EST  DISCHARGE PLANNING EVALUATION:    [place]       Admitted: 1/5/2022/ Lg 21 day: 0   Location: -18/018-A Reason for admit: Physical deconditioning [R53.81]  Inability to walk [R26.2]  Frequent falls [R29.6]  Contusion of chest wall, unspecified laterality, initial encounter [facility]   PMH:  has a past medical history of Aortic stenosis, mild to moderate, BPH (benign prostatic hyperplasia), Carpal tunnel syndrome on right, Chronic gout, COVID-19, DM2 (diabetes mellitus, type 2) ([address] Dyslipidemia, GERD (gastroesophageal reflux disease), Heart failure with preserved ejection fraction ([address] History of alcohol abuse, History of atrial fibrillation, History of osteomyelitis, Hyperlipidemia, Hypertension, essential, Hypogonadism, male, Major depression, Mood disorder ([facility] [address] Morbid obesity ([facility] [address] DURAN (nonalcoholic steatohepatitis), KIARA treated with BiPAP, and Vitamin D deficiency. Procedure: N/A  Barriers to Discharge:  Patient presents after falling at home with right leg pain and abdominal pain. He reports weakness and difficulty with ambulation. IV fluids, Eliquis, DM management, prn pain medications and Zofran, Potassium replacement protocol, carb choice diet, PT/OT, up with assistance. PCP: SANDRA Palacios - CNP   %    Patient Goals/Plan/Treatment Preferences: Met with Frank Lizarraga. He resides at home with his wife and adult step-son. Frank Lizarraga verifies his insurance and PCP. He is able to afford his medications and uses a quad cane. He will have transportation to home at discharge. SNF placement discussed with Frank Lizarraga. He states he does not plan to go to a SNF. He plans to return home at discharge. Frank Lizarraga states he needs to stay in the hospital for a few days for his soreness to resolve. Explained he would not remain in the hospital for that. Offered SNF placement for his recovery and he states, no, I will go home. He does agree to [facility] at discharge for PT/OT. Explained to Mandi Robles he would be discharged soon as he is in observation status, understanding verbalized. Mandi Robles states he has been out of bed and ambulated to the bathroom with the walker in his room. Transportation/Food Security/Housekeeping Addressed:  No issues identified.

## 2022-01-06 NOTE — PLAN OF CARE
Problem: Pain:  Goal: Pain level will decrease  Description: Pain level will decrease  Outcome: Ongoing  Goal: Control of acute pain  Description: Control of acute pain  Outcome: Ongoing  Goal: Patient's pain/discomfort is manageable  Description: Patient's pain/discomfort is manageable  Outcome: Ongoing     Problem: Infection:  Goal: Will remain free from infection  Description: Will remain free from infection  Outcome: Not Met This Shift     Problem: Safety:  Goal: Free from accidental physical injury  Description: Free from accidental physical injury  Outcome: Met This Shift  Goal: Free from intentional harm  Description: Free from intentional harm  Outcome: Met This Shift     Problem: Daily Care:  Goal: Daily care needs are met  Description: Daily care needs are met  Outcome: Met This Shift

## 2022-01-06 NOTE — PROGRESS NOTES
Vs: BP (!) 153/104   Pulse 77   Temp 98.8 °F (37.1 °C) (Oral)   Resp 18   Ht 5' 8\" (1.727 m)   Wt 280 lb (127 kg)   SpO2 92%   BMI 42.57 kg/m²       Patient arrived to unit at this time with wife at bedside. Patient assessed by Eber Murrell and VS obtained. VSS no c/o increased pain or discomfort voiced from patient at this time. Patient oriented to room, call light and staff. Patient offered snacks and fluids encouraged per diet recommendation. Patient voices no further needs from staff at this time. Will continue to monitor.     Atul Antonio RN

## 2022-01-06 NOTE — CARE COORDINATION
1/6/22, 1:24 PM EST    DISCHARGE PLANNING EVALUATION    Spoke with patient and spouse re: selection of home health agency. They would like to use  [facility].

## 2022-01-06 NOTE — CARE COORDINATION
not want to go to a nursing home (see note for 1/6/22). Spouse indicated to both SW and CM that she would prefer that patient return home. She is receptive to home health services for nursing and therapy. She will be able to transport patient home at discharge.      Shandaectronically signed by RAYMOND Shelton on 1/6/2022 at 12:19 PM

## 2022-01-06 NOTE — DISCHARGE SUMMARY
changed to outpatient as patient demonstrated good mobility and stability to go home. The patient was stable for discharge - all consultants were contacted and in agreement with plan for discharge. Appropriate follow up appointment was arranged prior to discharge. Please see below or view chart for more details from hospital course. Discharge Diagnoses:    1. Physical deconditioning s/p fall: Patient reportedly fall at home after tripping and falling onto her right knee and abdomen. No loss of consciousness or prodromal symptoms at the time. Imaging was negative for acute fracture or pathology. Pain gradually improved during admission, and patient opted to go home rather than SNF placement. He has proper support at home with wife and adult stepson. Plan for outpatient MRI lumbar spine to evaluate for any logic process. 2.  DM2: Continue outpatient home diabetes medications  3. PAF: Continue home Eliquis. Rate controlled during admission. 4. HTN: Does not appear to be filling hydralazine medication. Will discontinue at discharge with PCP follow-up  5. Rheumatoid Arthritis: States he follows with rheumatologist, previously on infusion medications. Currently prescribed Norco OP, can continue at home. The patient was seen and examined on day of discharge and this discharge summary is in conjunction with any daily progress note from day of discharge. The patient understood, was in agreement, and verbalized the plan of care at time of discharge.         Exam:    Vitals:   Vitals:    01/05/22 1840 01/05/22 1945 01/06/22 1027 01/06/22 1546   BP: (!) 150/108 (!) 153/104 (!) 140/91 (!) 145/92   Pulse: 69 77 65 64   Resp: 17 18 20 16   Temp:  98.8 °F (37.1 °C) 98.4 °F (36.9 °C) 98.3 °F (36.8 °C)   TempSrc:  Oral Oral Oral   SpO2: 92% 92% 93% 93%   Weight:       Height:         Weight: Weight: 280 lb (127 kg)    24 hour intake/output:    Intake/Output Summary (Last 24 hours) at 1/6/2022 1653  Last data filed at 1/6/2022 1333  Gross per 24 hour   Intake 0 ml   Output 725 ml   Net -725 ml         Exam:  General appearance: No apparent distress, appears stated age and cooperative. HEENT: Pupils equal, round, and reactive to light. Conjunctivae/corneas clear. Neck: Supple, with full range of motion. No jugular venous distention. Trachea midline. Respiratory:  Normal respiratory effort. Clear to auscultation, bilaterally without Rales/Wheezes/Rhonchi. Cardiovascular: Regular rate and rhythm with normal S1/S2 without murmurs, rubs or gallops. Abdomen: Soft, non-tender, non-distended with normal bowel sounds. Musculoskeletal: passive and active ROM x 4 extremities. Chronic weakness of LLE with inability to move distal LLE. Muscle strength testing of L ankle 0/5 with plantar/dorsiflexion. Skin: Dry, scaling skin in bilateral lower extremities  Neurologic:  Neurovascularly intact without any focal sensory/motor deficits. Cranial nerves: II-XII intact, grossly non-focal.  Psychiatric: Alert and oriented, thought content appropriate, normal insight  Capillary Refill: Brisk,< 3 seconds   Peripheral Pulses: +2 palpable, equal bilaterally         Labs: For convenience and continuity at follow-up the following most recent labs are provided:      CBC:    Lab Results   Component Value Date    WBC 9.4 01/06/2022    HGB 15.4 01/06/2022    HCT 48.6 01/06/2022     01/06/2022       Renal:    Lab Results   Component Value Date     01/06/2022    K 4.2 01/06/2022    K 4.3 12/30/2021     01/06/2022    CO2 27 01/06/2022    BUN 22 01/06/2022    CREATININE 1.1 01/06/2022    CALCIUM 9.3 01/06/2022    PHOS 3.6 10/31/2020         Significant Diagnostic Studies    Radiology:   CT CHEST W CONTRAST   Final Result   1. Dependent atelectasis is noted at the lung bases. A few scattered patchy groundglass opacities are nonspecific and could be infectious/inflammatory. 2. No solid organ injury. No acute fracture. Chronic arthritic changes are discussed. **This report has been created using voice recognition software. It may contain minor errors which are inherent in voice recognition technology. **      Final report electronically signed by Dr Prince Rodas on 1/5/2022 11:16 AM      CT ABDOMEN PELVIS W IV CONTRAST Additional Contrast? None   Final Result   1. Dependent atelectasis is noted at the lung bases. A few scattered patchy groundglass opacities are nonspecific and could be infectious/inflammatory. 2. No solid organ injury. No acute fracture. Chronic arthritic changes are discussed. **This report has been created using voice recognition software. It may contain minor errors which are inherent in voice recognition technology. **      Final report electronically signed by Dr Prince Rodas on 1/5/2022 11:16 AM      CT HEAD WO CONTRAST   Final Result    No evidence of acute intracranial abnormality. **This report has been created using voice recognition software. It may contain minor errors which are inherent in voice recognition technology. **      Final report electronically signed by Dr. April Knight MD on 1/5/2022 11:06 AM      CT CERVICAL SPINE WO CONTRAST   Final Result   No acute fracture or subluxation in the cervical spine. **This report has been created using voice recognition software. It may contain minor errors which are inherent in voice recognition technology. **      Final report electronically signed by Dr Stephanie Mcmahon on 1/5/2022 11:01 AM      XR TIBIA FIBULA RIGHT (2 VIEWS)   Final Result   No acute osseous abnormality            **This report has been created using voice recognition software. It may contain minor errors which are inherent in voice recognition technology. **      Final report electronically signed by Dr. Susan Pradhan on 1/5/2022 9:54 AM      XR FOOT RIGHT (MIN 3 VIEWS)   Final Result   1. No acute fracture is seen.    2. Degenerative changes of the right foot. 3. Extensive soft tissue calcification relating to dermatomyositis. 4. Moderate soft tissue swelling is seen. 5. Achilles tendinopathy. **This report has been created using voice recognition software. It may contain minor errors which are inherent in voice recognition technology. **      Final report electronically signed by Dr Melba Barboza on 1/5/2022 9:53 AM      XR FEMUR RIGHT (MIN 2 VIEWS)   Final Result   No acute process            **This report has been created using voice recognition software. It may contain minor errors which are inherent in voice recognition technology. **      Final report electronically signed by Dr. John Cuevas on 1/5/2022 9:46 AM      MRI LUMBAR SPINE WO CONTRAST    (Results Pending)         Consults:     IP CONSULT TO CASE MANAGEMENT  IP CONSULT TO SOCIAL WORK    Disposition:    [x] Home        [] TCU       [] Rehab       [] Psych       [] SNF       [] [place]       [] Other-    Condition at Discharge: fair    Code Status:  Full Code     Admitted for: Fall    Patient Instructions:  Discharge lab work: Lumbar MRI OP  Activity: activity as tolerated  Diet: ADULT DIET; Regular; 4 carb choices (60 gm/meal)    Follow-up visits:   No follow-up provider specified.       Discharge Medications:        Medication List      ASK your doctor about these medications    ABILIFY PO     acetaminophen 325 MG tablet  Commonly known as: TYLENOL     albuterol sulfate  (90 Base) MCG/ACT inhaler  Commonly known as: Proventil HFA  Inhale 2 puffs into the lungs every 6 hours as needed for Wheezing     allopurinol 300 MG tablet  Commonly known as: ZYLOPRIM  Take 1 tablet by mouth daily     apixaban 5 MG Tabs tablet  Commonly known as: ELIQUIS  Take 10mg by mouth two times daily for 6 days followed by 5mg by mouth two times daily     atorvastatin 40 MG tablet  Commonly known as: LIPITOR     busPIRone 10 MG tablet  Commonly known as: BUSPAR     CELEXA PO     CHOLECALCIFEROL PO     clotrimazole-betamethasone 1-0.05 % cream  Commonly known as: LOTRISONE  Apply topically 2 times daily. ferrous sulfate 325 (65 Fe) MG tablet  Commonly known as: IRON 325  Take 1 tablet by mouth 2 times daily (with meals)     folic acid 1 MG tablet  Commonly known as: [facility]  Patient needs all supplies for qd testing. DX: 250.00     furosemide 40 MG tablet  Commonly known as: LASIX     glycopyrrolate-formoterol 9-4.8 MCG/ACT Aero  Commonly known as: BEVESPI  Inhale 2 puffs into the lungs 2 times daily     hydrALAZINE 50 MG tablet  Commonly known as: APRESOLINE  Take 1 tablet by mouth 3 times daily     Janumet  MG per tablet  Generic drug: sitaGLIPtan-metFORMIN     ondansetron 4 MG tablet  Commonly known as: ZOFRAN     pantoprazole 40 MG tablet  Commonly known as: PROTONIX  Take 1 tablet by mouth 2 times daily (before meals)     potassium chloride 20 MEQ extended release tablet  Commonly known as: KLOR-CON M  Take 1 tablet by mouth daily     pregabalin 50 MG capsule  Commonly known as: LYRICA     SOBIA-BID PROBIOTIC PO     senna 8.6 MG tablet  Commonly known as: SENOKOT     tamsulosin 0.4 MG capsule  Commonly known as: FLOMAX     therapeutic multivitamin-minerals tablet     vitamin C 500 MG tablet  Commonly known as: ASCORBIC ACID  Take 2 tablets by mouth daily            Discharge Time:  Time spent on discharge is >40 minutes in the examination, evaluation, counseling, and review of medications and discharge plan. The hospital course was discussed with the patient and all questions and concerns were addressed at that time. The patient was in agreement with and verbalized understanding of the plan of care and had no additional questions or complaints. The patient was instructed to follow-up with any scheduled outpatient appointments or to report to the nearest Emergency Department if new or worsening symptoms should arise.     Thank you SANDRA Kc CNP for the opportunity to be involved in this patient's care.     Signed:    Electronically signed by Aj Long MD, PGY-1 on 1/6/2022 at 4:53 PM

## 2022-01-06 NOTE — PROGRESS NOTES
AE)  Ambulation Assistance: Independent (using quad cane)  Transfer Assistance: Independent (from elevated surface of rollator)    Active : Yes  Mode of Transportation: Car  Occupation: On disability       VISION:WFL    HEARING:  WFL    COGNITION: WFL    RANGE OF MOTION:  B UE AROM WFL    STRENGTH:  B UE strength WFL    SENSATION:   Patient reports no changes in B UE sensation, but does report diminished sensation in B LE due to previous COVID infection    ADL:   Grooming: Stand By Assistance. with set up at the edge of the bed  Lower Extremity Dressing: Minimal Assistance. with use of dressing stick. BALANCE:  Standing Balance: Contact Guard Assistance. with RW; decreased balance with quad cane to min A    BED MOBILITY:  Scooting: Contact Guard Assistance      TRANSFERS:  Sit to Stand:  Minimal Assistance. from elevated bed, reports that he sits on an elevated surface at home  Stand to Sit: [employer]. cues for safefy    FUNCTIONAL MOBILITY:  Assistive Device: Rolling Walker  Assist Level:  Contact Guard Assistance. Distance: 5 feet  Patient required min A with quad cane       Activity Tolerance:  Patient tolerance of  treatment: fair. Patient required rest breaks      Assessment:  Assessment: Patient present to the hospital with increased pain on the right side after falling at home. At this time patient requires assist for dressing and functional transfers. In addition, pain is limiting his ability for functional ambulation in his room. Patient requires skilled therapy to increase ability to return to previous functional level and decrease burden of care on others. Performance deficits / Impairments: Decreased functional mobility ,Decreased endurance,Decreased ADL status  Prognosis: Good  REQUIRES OT FOLLOW UP: Yes    Treatment Initiated: Treatment and education initiated within context of evaluation.   Evaluation time included review of current medical information, gathering information related to past medical, social and functional history, completion of standardized testing, formal and informal observation of tasks, assessment of data and development of plan of care and goals. Treatment time included skilled education and facilitation of tasks to increase safety and independence with ADL's for improved functional independence and quality of life. Discharge Recommendations:  Continue to assess pending progress,Subacute/Skilled Nursing Facility    Patient Education:  OT Education: Toya Lara of Care,Transfer Training    Equipment Recommendations:  Equipment Needed:  (At this time patient would benefit from [employer] quad cane, and reacher)    Plan:  Times per week: 5x  Times per day: Daily  Current Treatment Recommendations: Functional Mobility Training,Patient/Caregiver Education & Training,Equipment Evaluation, Education, & procurement,Safety Education & Training,Self-Care / ADL. See long-term goal time frame for expected duration of plan of care. If no long-term goals established, a short length of stay is anticipated. Goals:  Patient goals : to be able to walk as prior to hospitalization  Short term goals  Time Frame for Short term goals: by discharge  Short term goal 1: Patient will be educated regarding LB AE and demonstrate use with reacher with no greater than SBA for LB dressing. Short term goal 2: Patient will demonstrate functional transfers with no greater than SBA, from elevated surface, to increase independence for toilet transfer. Short term goal 3: Patient will demonstrate functional ambulation to and from the bathroom with AD with SBA to increase ability to mobilize to the commode at home. Following session, patient left in safe position with all fall risk precautions in place.

## 2022-01-29 ENCOUNTER — HOSPITAL ENCOUNTER (OUTPATIENT)
Dept: MRI IMAGING | Age: 52
Discharge: HOME | End: 2022-01-29
Payer: MEDICARE

## 2022-01-29 DIAGNOSIS — M54.16 LUMBAR RADICULOPATHY: ICD-10-CM

## 2022-01-29 PROCEDURE — 72148 MRI LUMBAR SPINE W/O DYE: CPT

## 2022-02-09 ENCOUNTER — OFFICE VISIT (OUTPATIENT)
Dept: NEUROSURGERY | Age: 52
End: 2022-02-09
Payer: MEDICARE

## 2022-02-09 ENCOUNTER — HOSPITAL ENCOUNTER (OUTPATIENT)
Dept: SLEEP CENTER | Age: 52
Discharge: HOME | End: 2022-02-11
Payer: MEDICARE

## 2022-02-09 VITALS
DIASTOLIC BLOOD PRESSURE: 103 MMHG | RESPIRATION RATE: 16 BRPM | BODY MASS INDEX: 43.8 KG/M2 | WEIGHT: 289 LBS | SYSTOLIC BLOOD PRESSURE: 151 MMHG | HEIGHT: 68 IN | HEART RATE: 88 BPM | OXYGEN SATURATION: 98 %

## 2022-02-09 DIAGNOSIS — R20.2 NUMBNESS AND TINGLING OF BOTH LOWER EXTREMITIES: ICD-10-CM

## 2022-02-09 DIAGNOSIS — G47.33 OSA TREATED WITH BIPAP: ICD-10-CM

## 2022-02-09 DIAGNOSIS — G47.10 HYPERSOMNIA: ICD-10-CM

## 2022-02-09 DIAGNOSIS — M21.372 LEFT FOOT DROP: Primary | ICD-10-CM

## 2022-02-09 DIAGNOSIS — F32.A DEPRESSION, UNSPECIFIED DEPRESSION TYPE: ICD-10-CM

## 2022-02-09 DIAGNOSIS — I10 ESSENTIAL HYPERTENSION: ICD-10-CM

## 2022-02-09 DIAGNOSIS — R20.0 NUMBNESS AND TINGLING OF BOTH LOWER EXTREMITIES: ICD-10-CM

## 2022-02-09 DIAGNOSIS — M54.50 LUMBAR PAIN: ICD-10-CM

## 2022-02-09 PROCEDURE — 1036F TOBACCO NON-USER: CPT

## 2022-02-09 PROCEDURE — 99213 OFFICE O/P EST LOW 20 MIN: CPT

## 2022-02-09 PROCEDURE — 95811 POLYSOM 6/>YRS CPAP 4/> PARM: CPT

## 2022-02-09 PROCEDURE — G8417 CALC BMI ABV UP PARAM F/U: HCPCS

## 2022-02-09 PROCEDURE — G8427 DOCREV CUR MEDS BY ELIG CLIN: HCPCS

## 2022-02-09 PROCEDURE — G8482 FLU IMMUNIZE ORDER/ADMIN: HCPCS

## 2022-02-09 PROCEDURE — 3017F COLORECTAL CA SCREEN DOC REV: CPT

## 2022-02-09 ASSESSMENT — ENCOUNTER SYMPTOMS
COLOR CHANGE: 0
TROUBLE SWALLOWING: 0
COUGH: 0
CONSTIPATION: 0
NAUSEA: 0
BACK PAIN: 1
SHORTNESS OF BREATH: 0

## 2022-02-09 NOTE — PATIENT INSTRUCTIONS
Patient Education        Preventing Falls: Care Instructions  Your Care Instructions     Getting around your home safely can be a challenge if you have injuries or health problems that make it easy for you to fall. Loose rugs and furniture in walkways are among the dangers for many older people who have problems walking or who have poor eyesight. People who have conditions such as arthritis, osteoporosis, or dementia also have to be careful not to fall. You can make your home safer with a few simple measures. Follow-up care is a key part of your treatment and safety. Be sure to make and go to all appointments, and call your doctor if you are having problems. It's also a good idea to know your test results and keep a list of the medicines you take. How can you care for yourself at home? Taking care of yourself  · You may get dizzy if you do not drink enough water. To prevent dehydration, drink plenty of fluids. Choose water and other clear liquids. If you have kidney, heart, or liver disease and have to limit fluids, talk with your doctor before you increase the amount of fluids you drink. · Exercise regularly to improve your strength, muscle tone, and balance. Walk if you can. Swimming may be a good choice if you cannot walk easily. · Have your vision and hearing checked each year or any time you notice a change. If you have trouble seeing and hearing, you might not be able to avoid objects and could lose your balance. · Know the side effects of the medicines you take. Ask your doctor or pharmacist whether the medicines you take can affect your balance. Sleeping pills or sedatives can affect your balance. · Limit the amount of alcohol you drink. Alcohol can impair your balance and other senses. · Ask your doctor whether calluses or corns on your feet need to be removed. If you wear loose-fitting shoes because of calluses or corns, you can lose your balance and fall.   · Talk to your doctor if you have numbness in your feet. Preventing falls at home  · Remove raised doorway thresholds, throw rugs, and clutter. Repair loose carpet or raised areas in the floor. · Move furniture and electrical cords to keep them out of walking paths. · Use nonskid floor wax, and wipe up spills right away, especially on ceramic tile floors. · If you use a walker or cane, put rubber tips on it. If you use crutches, clean the bottoms of them regularly with an abrasive pad, such as steel wool. · Keep your house well lit, especially [place], and outside walkways. Use night-lights in areas such as hallways and bathrooms. Add extra light switches or use remote switches (such as switches that go on or off when you clap your hands) to make it easier to turn lights on if you have to get up during the night. · Install sturdy handrails on stairways. · Move items in your cabinets so that the things you use a lot are on the lower shelves (about waist level). · Keep a cordless phone and a flashlight with new batteries by your bed. If possible, put a phone in each of the main rooms of your house, or carry a cell phone in case you fall and cannot reach a phone. Or, you can wear a device around your neck or wrist. You push a button that sends a signal for help. · Wear low-heeled shoes that fit well and give your feet good support. Use footwear with nonskid soles. Check the heels and soles of your shoes for wear. Repair or replace worn heels or soles. · Do not wear socks without shoes on wood floors. · Walk on the grass when the sidewalks are slippery. If you live in an area that gets snow and ice in the winter, sprinkle salt on slippery steps and sidewalks. Preventing falls in the bath  · Install grab bars and nonskid mats inside and outside your shower or tub and near the toilet and sinks. · Use shower chairs and bath benches. · Use a hand-held shower head that will allow you to sit while showering.   · Get into a tub or shower by putting the weaker leg in first. Get out of a tub or shower with your strong side first.  · Repair loose toilet seats and consider installing a raised toilet seat to make getting on and off the toilet easier. · Keep your bathroom door unlocked while you are in the shower. Where can you learn more? Go to https://[email or web address].tinyclues. org and sign in to your [employer] account. Enter [phone] in the [facility] box to learn more about \"Preventing Falls: Care Instructions. \"     If you do not have an account, please click on the \"Sign Up Now\" link. Current as of: September 8, 2021               Content Version: 13.1  © 1936-7037 [employer]. Care instructions adapted under license by [facility] ([facility]). If you have questions about a medical condition or this instruction, always ask your healthcare professional. [place] [address] any warranty or liability for your use of this information.

## 2022-02-09 NOTE — PROGRESS NOTES
[facility] PROFESSIONAL SERVS  [address]  [address] [place] [address] [address] [address]  Dept: [phone]  Dept Fax: [phone]      Patient Name:  Raven Perla  Visit Date:  2/9/2022    HPI:     Mr. Beckie Meyer is a 46 y.o. male that presents today at [facility] for evaluation of the following:      Chief Complaint   Patient presents with    New Patient     spinal stenosis - aches pain, numbness, tingling everywhere, legs and feet the worst - back         HPI   Eliseo Aaron is a 46year old male who present to the office today with his wife. He presents in a wheelchair. He is using a 4 point cane today to assist in getting in and out of the wheelchair. He stated that he uses a rolling walker at home. He was referred for lumbar pain that developed after having covid. Patient has a past medical history of aortic stenosis, BPH, carpal tunnel syndrome on the right, chronic gout, diabetes mellitus type 2, dyslipidemia, GERD, heart failure with preserved ejection fracture, alcohol abuse, atrial fibrillation, osteomyelitis, hypertension, hypogonadism, major depression, mood disorder, morbid obesity, [place], obstructive sleep apnea treated with BiPAP, poor dentition, and vitamin D deficiency. Patient has never used tobacco, he is currently not consuming alcohol, denies history of drug use. Carol had Covid September 2020 and was in the hospital 40 days. He then went to [facility] for 4 months for therapy. He is now home with his wife. He started to experience lower back pain  numbness and tingling of the left leg and foot after he woke up from being on the ventilator. Patient stated since then he has had numbness and tingling in both lower extremities. He stated that the left is worse than the right. He stated that he started having issues controlling his bladder after having covid. He currently wears depends.  He currently has PT/OT through [facility]. He stated that he fell 2 weeks ago  On his right side when he lost his balance going up the stairs. He  fell a month prior to that on his right side. He stated his left leg is weak. He stated that his left leg has dragged since having covid and he trips over his left foot a lot and relates this to why he has had several falls. He stated that he can't pull his toes to his nose on his left foot. He stated his pain at its worst is a 6. He stated his pain on average is a 5 on a scale of 1-10. Patient states his pain is constant. He describes his pain as a aching, dull pain. He stated the pain radiates down the back of his left leg. He stated bending twisting lifting makes his pain worse. Patient takes [address] for his pain his PCP has been prescribing him pain medication. He stated this has provided him relief. Patient stated he has also tried ice and heat which has helped. Patient has never been to chiropractor. He is currently receiving home health PT/OT. Patient has never been seen by a pain management doctor. MRI of the lumbar spine without contrast was performed when 1/29/2022 it showed congenital spinal canal stenosis with superimposed facet hypertrophy and ligamentum flavum thickening with up to moderate spinal canal stenosis at L3-4 and L4-5 and up to mild to moderate neural foraminal stenosis at the L4-5 and L5-S1 levels. Biconcave appearance of L2-L5 vertebral bodies with scalloping of the superior and inferior endplates likely on the basis of osteoporosis. There is no edema to suggest an acute process. He stated that he has sleep apnea and has not been sleeping well. He stated that he his sleep study today. Medications:    Current Outpatient Medications:     pantoprazole (PROTONIX) 40 MG tablet, Take 1 tablet by mouth 2 times daily (before meals), Disp: 60 tablet, Rfl: 5    pregabalin (LYRICA) 50 MG capsule, Take 50 mg by mouth 3 times daily. , Disp: , Rfl:     clotrimazole-betamethasone (LOTRISONE) 1-0.05 % cream, Apply topically 2 times daily. , Disp: 1 each, Rfl: 0    potassium chloride (KLOR-CON M) 20 MEQ extended release tablet, Take 1 tablet by mouth daily, Disp: 30 tablet, Rfl: 2    apixaban (ELIQUIS) 5 MG TABS tablet, Take 10mg by mouth two times daily for 6 days followed by 5mg by mouth two times daily, Disp: 60 tablet, Rfl: 1    glycopyrrolate-formoterol (BEVESPI) 9-4.8 MCG/ACT AERO, Inhale 2 puffs into the lungs 2 times daily, Disp: 10.7 g, Rfl: 1    ferrous sulfate (IRON 325) 325 (65 Fe) MG tablet, Take 1 tablet by mouth 2 times daily (with meals), Disp: 30 tablet, Rfl: 3    albuterol sulfate HFA (PROVENTIL HFA) 108 (90 Base) MCG/ACT inhaler, Inhale 2 puffs into the lungs every 6 hours as needed for Wheezing, Disp: 1 Inhaler, Rfl: 3    vitamin C (ASCORBIC ACID) 500 MG tablet, Take 2 tablets by mouth daily, Disp: 30 tablet, Rfl: 0    CHOLECALCIFEROL PO, Take 2,000 Units by mouth daily, Disp: , Rfl:     atorvastatin (LIPITOR) 40 MG tablet, Take 40 mg by mouth nightly, Disp: , Rfl:     acetaminophen (TYLENOL) 325 MG tablet, Take 650 mg by mouth every 4 hours as needed for Pain, Disp: , Rfl:     busPIRone (BUSPAR) 10 MG tablet, Take 10 mg by mouth 2 times daily , Disp: , Rfl:     allopurinol (ZYLOPRIM) 300 MG tablet, Take 1 tablet by mouth daily, Disp: 90 tablet, Rfl: 0    Multiple Vitamins-Minerals (THERAPEUTIC MULTIVITAMIN-MINERALS) tablet, Take 1 tablet by mouth daily, Disp: , Rfl:     Probiotic Product (SOBIA-BID PROBIOTIC PO), Take 1 tablet by mouth daily , Disp: , Rfl:     ondansetron (ZOFRAN) 4 MG tablet, Take 4 mg by mouth every 8 hours as needed for Nausea or Vomiting, Disp: , Rfl:     tamsulosin (FLOMAX) 0.4 MG capsule, Take 0.4 mg by mouth nightly , Disp: , Rfl:     folic acid (FOLVITE) 1 MG tablet, Take 1 mg by mouth daily, Disp: , Rfl:     furosemide (LASIX) 40 MG tablet, Take 40 mg by mouth daily , Disp: , Rfl:     sitaGLIPtan-metformin (JANUMET)  MG per tablet, Take 1 tablet by mouth 2 times daily (with meals) , Disp: , Rfl:     senna (SENOKOT) 8.6 MG tablet, Take 1 tablet by mouth 2 times daily, Disp: , Rfl:     ARIPiprazole (ABILIFY PO), Take 15 mg by mouth daily , Disp: , Rfl:     Citalopram Hydrobromide (CELEXA PO), Take 30 mg by mouth daily From [employer] , Disp: , Rfl:     Blood Glucose Monitoring Suppl (FREESTYLE LITE) ARTIE, Patient needs all supplies for qd testing. DX: 250.00, Disp: 1 Device, Rfl: 11    The patient is allergic to penicillins. Past Medical History  Lana Woody  has a past medical history of Aortic stenosis, mild to moderate, BPH (benign prostatic hyperplasia), Carpal tunnel syndrome on right, Chronic gout, COVID-19, DM2 (diabetes mellitus, type 2) ([address] Dyslipidemia, GERD (gastroesophageal reflux disease), Heart failure with preserved ejection fraction ([address] History of alcohol abuse, History of atrial fibrillation, History of osteomyelitis, Hyperlipidemia, Hypertension, essential, Hypogonadism, male, Major depression, Mood disorder ([address] Morbid obesity ([address] DURAN (nonalcoholic steatohepatitis), KIARA treated with BiPAP, and Vitamin D deficiency. Past Surgical History  The patient  has a past surgical history that includes tracheostomy; Foot surgery (Right); hip surgery (Left, 6/29/2020); sigmoidoscopy (N/A, 10/26/2020); Upper gastrointestinal endoscopy (N/A, 11/14/2020); Colonoscopy (N/A, 11/15/2020); Tonsillectomy; Upper gastrointestinal endoscopy (N/A, 12/27/2021); and Endoscopy, colon, diagnostic. Family History  This patient's family history includes Cancer in his paternal aunt; Heart Disease in his mother. Social History  Lana Woody  reports that he has never smoked. He has never used smokeless tobacco. He reports previous alcohol use. He reports that he does not use drugs. Subjective:      Review of Systems   Constitutional: Negative for activity change, appetite change, fatigue and fever.    HENT: Negative for trouble swallowing. Eyes: Negative for visual disturbance. Respiratory: Negative for cough and shortness of breath. Cardiovascular: Negative for chest pain. Gastrointestinal: Negative for constipation and nausea. Genitourinary: Positive for urgency. Incontinence   Musculoskeletal: Positive for arthralgias, back pain, gait problem (unsteady) and myalgias. Skin: Negative for color change, rash and wound. Neurological: Positive for weakness and numbness. Negative for dizziness. Psychiatric/Behavioral: Positive for sleep disturbance. Negative for confusion. The patient is not nervous/anxious. Objective:     BP (!) 151/103 (Site: Left Upper Arm, Position: Sitting, Cuff Size: Large Adult)   Pulse 88   Resp 16   Ht 5' 8\" (1.727 m)   Wt 289 lb (131.1 kg)   SpO2 98%   BMI 43.94 kg/m²          Physical Exam  Constitutional:       Appearance: Normal appearance. He is not ill-appearing. Cardiovascular:      Rate and Rhythm: Normal rate. Pulses: Normal pulses. Pulmonary:      Effort: Pulmonary effort is normal.   Abdominal:      General: Abdomen is flat. Musculoskeletal:         General: Tenderness present. Lumbar back: Tenderness present. No spasms. Positive left straight leg raise test.        Back:    Skin:     General: Skin is warm and dry. Neurological:      Mental Status: He is alert and oriented to person, place, and time. Sensory: Sensory deficit present. Motor: Weakness present. Gait: Gait abnormal (unsteady). Comments: RLE 4/5  LLE 3/5    Absent dorsal flexion on left lower extremity. He states this has been absent since he got off the vent from having covid    Downward babinski on right    Babinski absent on left   Psychiatric:         Mood and Affect: Mood normal.         Behavior: Behavior normal.         Thought Content:  Thought content normal.         Judgment: Judgment normal.         Reviewed MRI Type:lumbar spine  Film and Report    Lab Results   Component Value Date    WBC 9.4 01/06/2022    HGB 15.4 01/06/2022    HCT 48.6 01/06/2022     01/06/2022    ALT 28 01/05/2022    AST 30 01/05/2022     01/06/2022    K 4.2 01/06/2022     01/06/2022    CREATININE 1.1 01/06/2022    BUN 22 01/06/2022    CO2 27 01/06/2022    INR 1.14 (H) 11/07/2020    LABA1C 7.3 (H) 09/06/2021       Assessment and Plan      Diagnosis Orders   1. Left foot drop     2. Lumbar pain     3. Numbness and tingling of both lower extremities         - New patient referral  - Discussed results of MRI of the lumbar spine with patient   - Ice to lumbar spine 20 minutes at a time every 2 hours for pain  - CT of lumbar spine without contrast  -AFO for left foot drop  -Continue home health exercises that were provided by PT/OT  -Pain management referral  - continue rolling walker  - Fall precautions  -All patient questions answered. Pt voiced understanding.  Patient instructed to call the office with any questions or concerns    Electronically signed by SANDRA Vazquez CNP on 2/9/2022 at 1:43 PM

## 2022-02-10 LAB — STATUS: NORMAL

## 2022-02-10 NOTE — PROGRESS NOTES
No Preference for DME. Title:  CPAP/BiLevel Titration's    Approved by:  Yeimi Mon MD      Approval Date:  December, 2019 Next Review:  December, 2021     Responsible Party:  [facility] Institution/Entities Applies to:   Jose Dasilva Number:  None    Document Type:  Such as Guideline, Policy, Policy & Procedure, or Procedure, Instructions  Manual:  Policy and Procedures    Section: IV Policy Start Date: October, 9747           POLICY: Positive airway pressure device is used to treat patients with sleep related breathing disorders characterized by full or partial occlusion of the upper airway during sleep. A patient must have undergone polysomnography and diagnosed with obstructive sleep apnea. All individuals who record sleep studies must follow best practices for CPAP/bilevel/ASV titrations in order to attain the ideal pressure setting for their patients. Too low of pressure may cause patients to either be sub-optimally treated or to wake up in a panic. Too much pressure may cause the patient to experience bloating or mask leakage. Determining the appropriate pressure setting for each patient will lead to improved adherence and outcome. Titrations are not an exact science, and it is understood that technologists may need to make minor changes for individual patients. The procedures outlined below are meant to be a guideline and follow the spirit of the AASM clinical guidelines. PROCEDURE:    CPAP:    1. Review the patients pertinent medical al history, previous sleep study or studies to ass the severity of sleep disordered breathing. Review of pertinent information will help to attain a better titration. 2. Applications of electrodes, montages, filters, sensitivities and scoring will be performed according to the current version of the AASM Scoring Manual.   3. Prior to initiating study collect all appropriate PAP supplies  a.  Tubing b. Humidifier (filled with distilled water)  c. Masks   4. The technologist should assess and measure patient for most appropriate mask prior to start of study. 5. CPAP should be initiated at 5 cm H20.  a. More pressure at start of study may be necessary if patient is morbidly obese or unable to fall asleep on a pressure of 5 cm H20   6. If apneas or frequent hypopneas are present, pressure settings should be increased by 2 cm H20. If occasional hypopneas, snoring, or mask flow limitation are present, pressure settings should be increased by 1 cm H20 and maintained for at least fie minutes to determine if events improve or resolve. Pressure settings may need to be increased more quickly during REM sleep given the limited amount of REM during sleep and the need to treat events during this stage. 7. If a mask leak occurs, the tech should first fix the leakage before raising the pressure. Otherwise, the final pressure setting chosen for the patient may be too high. Once the mask leak has been fixed, decrease the pressure to the last setting where mouth breathing and/or mask leakage was not present, and then re-titrate as indicated. Make sure to document directly on the study the steps taken to resolve the leak and the type of masks used. Pressure setting usually do not need to be set as high with a nasal-mask than with a full-face mask. 8. The recording technologist should document directly on the study at least every 30 minutes. 9. If the patient takes a break from wearing the mask, do not decrease the CPAP pressure on attempted return to sleep unless the patient remains awake for 15 minutes or the patient specifically requests that the pressure be lowered. 10. Do not raise pressure for central apneas. If the patient develops central apneas, pressure setting may need to be lowered.    11. If the patient is unable to tolerate CPAP secondary due to:  a. Persistent mouth breathing despite use of a full-face mask/chin strap  b. Inability to exhale against higher expiratory pressures (typically beginning anywhere from 15 to 20 cm of H20.  c. Has frequent central apneas, the use of bilevel positive airway pressure may be indicated. Document directly on study why the patient is being switched from CPAP to bilevel. 12. Ensure that supine sleep has been seen on the chosen setting. Going above the chosen setting 1 or 2 cm H20 to show range may be helpful to ensure that the correct pressure has been established. BiLEVEL:    1. Technologist may change from CPAP to bilevel during a study if proven the patient is unable to tolerate CPAP. 2. Review the patients pertinent medical al history, previous sleep study or studies to ass the severity of sleep disordered breathing. Review of pertinent information will help to attain a better titration. 3. Applications of electrodes, montages, filters, sensitivities and scoring will be performed according to the current version of the AASM Scoring Manual.   4. Prior to initiating study collect all appropriate PAP supplies  a. Tubing   b. Humidifier (filled with distilled water)  c. Masks   5. The technologist should assess and measure patient for most appropriate mask prior to start of study. 6. If the patient has not previously been on CPAP, beginning pressures should be 8/4 cm H20 or higher if patient is morbidly obese or unable to fall asleep at lower pressures. If the patient has been previously successfully treated on CPAP start expiratory pressure at therapeutic setting and set inspiratory pressure 4 cm H20 higher. If advancing from CPAP to bilevel during a study expiratory pressure should be set at most successful CPAP setting and inspiratory pressure set 4 cm h20 higher. 7. The standard differential pressure utilized during bilevel titrations typically ranges from 3 to 5 cm H20, with 4 cm H20 being the most common.   Higher differential pressures may be needed in patients who are morbidly obese or who have neuromuscular diseases. 8. If apneas or frequent hypopneas are present, inspiratory and expiratory pressure settings should be increased by 2 cm H20. If occasional hypopneas, snoring, or mask flow limitation are present inspiratory and expiratory pressures settings should be increased by 1 cm h20 and maintained for at least 5 minutes to determine if events improve or resolve. Pressure settings may need to be increased more quickly during REM sleep given the limited amount of REM during sleep and the need to treat events during this stage. 9. If a mask leak occurs, the tech should first fix the leakage before raising the pressure. Otherwise, the final pressure setting chosen for the patient may be too high. Once the mask leak has been fixed, decrease the pressure to the last setting where mouth breathing and/or mask leakage was not present, and then re-titrate as indicated. Make sure to document directly on the study the steps taken to resolve the leak and the type of masks used. Pressure setting usually do not need to be set as high with a nasal-mask than with a full-face mask. 10. The recording technologist should document directly on the study at least every 30 minutes. 11. If the patient takes a break from wearing the mask, do not decrease the CPAP pressure on attempted return to sleep unless the patient remains awake for 15 minutes or the patient specifically requests that the pressure be lowered. 12. Do not raise pressure for central apneas. If the patient develops central apneas, pressure setting may need to be lowered. If the patient has central apneas on bilevel, the use of spontaneous (ST) mode may be indicated. a. ST mode may only be used in 2 cases  i. An order for ST mode with a primary diagnosis of central sleep apnea  ii. During a titration if obstructive events are less than 5/ hour and centrals must be greater than 50% of total respiratory events. 13. Ensure that supine sleep has been seen on the chosen setting. Going above the chosen setting 1 or 2 cm H20 to show range may be helpful to ensure that the correct pressure has been established.

## 2022-02-11 DIAGNOSIS — G47.33 OSA TREATED WITH BIPAP: Primary | ICD-10-CM

## 2022-02-11 DIAGNOSIS — G47.10 HYPERSOMNIA: ICD-10-CM

## 2022-02-12 NOTE — PROGRESS NOTES
[address] [place] [address]                               SLEEP STUDY REPORT    PATIENT NAME: Jamar Garcia                :        1970  MED REC NO:   [id number]                           ROOM:  ACCOUNT NO:   [de-identified]                           ADMIT DATE: 2022  PROVIDER:     Rehana Mcnamara MD    DATE OF STUDY:  2022    REFERRING PROVIDER:  Rehana Velasquez. MD Naima    Patient height is 68 inches, weight is 290 pounds with a BMI of 44.1. HISTORY:  The patient is a 49-year-old gentleman who was recently  evaluated by me on 10/20/2021. The patient was diagnosed with severe  obstructive sleep apnea by split night sleep study performed on  2020. The patient was subsequently prescribed with BiPAP device  of 24/20 cm of water in . The patient used his BiPAP machine. He  was admitted to [facility]. The patient lost his BiPAP  device during his stay at [facility]. The patient wants to  go back on BiPAP therapy for his obstructive sleep apnea. The patient  had associated comorbidities including hypersomnia, hypertension,  depression, and type 2 diabetes mellitus along with sarcoidosis. METHODS:  The patient underwent digital polysomnography in compliance  with the standards and specifications from the AASM Manual including the  simultaneous recording of 3 EEG channels (F4-M1, C4-M1, and O2-M1 with  back up electrodes F3-M2, C3-M2, and O1-M2), 2 EOG channels (E1-M2, and  E2-M1,), EMG (chin, left & right leg), EKG, Nonin pulse oximetry with   less than 2 second averaging time, body position, airflow recorded by  oral-nasal thermal sensor and nasal air pressure transducer, plus  respiratory effort recorded by calibrated respiratory inductance  plethysmography (RIP), flow volume loop, sound and video.   Sleep staging  and scoring followed the standard put forth by the American Academy of  Sleep Medicine and utilized the 1B obstructive hypopnea event  desaturation of 4 percent or greater. INTERPRETATION:  This is a BiPAP re-titration study, and the study was  performed on 02/09/2022. The study was started at 09:17 p.m. and was  terminated at 05:15 a.m. with a total recording time of 477.8 minutes,  sleep period time was 475.2 minutes, and total sleep time was 422  minutes. Overall sleep efficiency was 88.3%. The sleep onset latency  was 2.6 minutes, and wake after sleep onset was 53.5 minutes. REM sleep  latency was 66.5 minutes. SLEEP STAGING AND DISTRIBUTION SUMMARY:  Revealed the patient spent 29  minutes in stage I consisting of 6.9% of total sleep time, 295 minutes  in stage II consisting of 69.9%, 98 minutes in REM sleep consisting of  23.2% of total sleep time. The patient had absent slow wave sleep. BIPAP RE-TITRATION STUDY:  The BiPAP re-titration study was started with  a BiPAP pressure of 10/6 cm of water, and the BiPAP pressure was  gradually increased to a final BiPAP pressure of 25/20 cm of water with  room air. At a final BiPAP pressure of 25/20 cm of water, the patient  spent 4 hours 24 minutes in bed. Out of 4 hours 24 minutes, the patient  slept for a period of 1 hour 5 minutes in REM sleep and 2 hours 38  minutes in non-REM sleep. At a final BiPAP pressure of 25/20 cm of  water, the patient had a total of 6 obstructive apneas and one central  apnea event and 58 obstructive hypopneas with an apnea-hypopnea index of  17.4. The patient had a maximum oxygen desaturation to 80% with a mean  oxygen saturation of 92.6%. This titration was performed on room air. At a final BiPAP pressure of 25/20 cm of water, majority of the time,  the oxygen saturation remained between 90 to 94% at final BiPAP  pressures on room air. PERIODIC LIMB MOVEMENT ANALYSIS:  Revealed the patient did not have any  periodic limb movements.   The patient had a total of 15

## 2022-02-15 ENCOUNTER — TELEPHONE (OUTPATIENT)
Dept: SLEEP CENTER | Age: 52
End: 2022-02-15

## 2022-02-18 ENCOUNTER — TELEPHONE (OUTPATIENT)
Dept: NEUROSURGERY | Age: 52
End: 2022-02-18

## 2022-02-25 ENCOUNTER — HOSPITAL ENCOUNTER (OUTPATIENT)
Dept: CT IMAGING | Age: 52
Discharge: HOME | End: 2022-02-25
Payer: MEDICARE

## 2022-02-25 ENCOUNTER — HOSPITAL ENCOUNTER (EMERGENCY)
Age: 52
Discharge: HOME | End: 2022-02-25
Attending: EMERGENCY MEDICINE
Payer: MEDICARE

## 2022-02-25 VITALS
HEART RATE: 81 BPM | BODY MASS INDEX: 43.95 KG/M2 | RESPIRATION RATE: 18 BRPM | OXYGEN SATURATION: 100 % | DIASTOLIC BLOOD PRESSURE: 81 MMHG | HEIGHT: 68 IN | WEIGHT: 290 LBS | SYSTOLIC BLOOD PRESSURE: 161 MMHG | TEMPERATURE: 98.6 F

## 2022-02-25 DIAGNOSIS — M54.50 LUMBAR PAIN: ICD-10-CM

## 2022-02-25 DIAGNOSIS — M21.372 LEFT FOOT DROP: ICD-10-CM

## 2022-02-25 DIAGNOSIS — M54.50 ACUTE EXACERBATION OF CHRONIC LOW BACK PAIN: Primary | ICD-10-CM

## 2022-02-25 DIAGNOSIS — R20.2 NUMBNESS AND TINGLING OF BOTH LOWER EXTREMITIES: ICD-10-CM

## 2022-02-25 DIAGNOSIS — G89.29 ACUTE EXACERBATION OF CHRONIC LOW BACK PAIN: Primary | ICD-10-CM

## 2022-02-25 DIAGNOSIS — R20.0 NUMBNESS AND TINGLING OF BOTH LOWER EXTREMITIES: ICD-10-CM

## 2022-02-25 PROCEDURE — 99283 EMERGENCY DEPT VISIT LOW MDM: CPT

## 2022-02-25 PROCEDURE — 6370000000 HC RX 637 (ALT 250 FOR IP): Performed by: STUDENT IN AN ORGANIZED HEALTH CARE EDUCATION/TRAINING PROGRAM

## 2022-02-25 PROCEDURE — 72131 CT LUMBAR SPINE W/O DYE: CPT

## 2022-02-25 RX ORDER — HYDROCODONE BITARTRATE AND ACETAMINOPHEN 5; 325 MG/1; MG/1
1 TABLET ORAL EVERY 6 HOURS PRN
Qty: 28 TABLET | Refills: 0 | Status: SHIPPED | OUTPATIENT
Start: 2022-02-25 | End: 2022-03-04

## 2022-02-25 RX ORDER — ACETAMINOPHEN 500 MG
1000 TABLET ORAL ONCE
Status: COMPLETED | OUTPATIENT
Start: 2022-02-25 | End: 2022-02-25

## 2022-02-25 RX ORDER — LIDOCAINE 4 G/G
1 PATCH TOPICAL DAILY
Status: DISCONTINUED | OUTPATIENT
Start: 2022-02-25 | End: 2022-02-25 | Stop reason: HOSPADM

## 2022-02-25 RX ADMIN — ACETAMINOPHEN 1000 MG: 500 TABLET ORAL at 13:38

## 2022-02-25 ASSESSMENT — PAIN SCALES - GENERAL: PAINLEVEL_OUTOF10: 9

## 2022-02-25 ASSESSMENT — ENCOUNTER SYMPTOMS
COUGH: 0
ABDOMINAL PAIN: 0
SHORTNESS OF BREATH: 0
SINUS PAIN: 0
COLOR CHANGE: 0
SORE THROAT: 0
DIARRHEA: 0
VOMITING: 0
BACK PAIN: 1
NAUSEA: 0
CONSTIPATION: 0
SINUS PRESSURE: 0

## 2022-02-25 NOTE — ED PROVIDER NOTES

## 2022-02-25 NOTE — ED TRIAGE NOTES
Pt arrives to ED with c/o lower back pain, pt states the pain is chronic but has run out of norco. He does see pain management on the 3rd, but states he cannot deal with the pain until then. Pt rates pain 9/10 at this time.     No other issues at this time

## 2022-03-03 ENCOUNTER — OFFICE VISIT (OUTPATIENT)
Dept: PHYSICAL MEDICINE AND REHAB | Age: 52
End: 2022-03-03
Payer: MEDICARE

## 2022-03-03 VITALS
HEIGHT: 68 IN | SYSTOLIC BLOOD PRESSURE: 136 MMHG | WEIGHT: 290 LBS | DIASTOLIC BLOOD PRESSURE: 80 MMHG | BODY MASS INDEX: 43.95 KG/M2

## 2022-03-03 DIAGNOSIS — M25.50 ARTHRALGIA, UNSPECIFIED JOINT: ICD-10-CM

## 2022-03-03 DIAGNOSIS — M47.816 LUMBAR SPONDYLOSIS: Primary | ICD-10-CM

## 2022-03-03 DIAGNOSIS — G89.4 CHRONIC PAIN SYNDROME: ICD-10-CM

## 2022-03-03 DIAGNOSIS — M48.062 SPINAL STENOSIS OF LUMBAR REGION WITH NEUROGENIC CLAUDICATION: ICD-10-CM

## 2022-03-03 DIAGNOSIS — M54.17 LUMBOSACRAL RADICULITIS: ICD-10-CM

## 2022-03-03 DIAGNOSIS — M47.816 LUMBAR FACET ARTHROPATHY: ICD-10-CM

## 2022-03-03 PROCEDURE — G8417 CALC BMI ABV UP PARAM F/U: HCPCS | Performed by: NURSE PRACTITIONER

## 2022-03-03 PROCEDURE — G8482 FLU IMMUNIZE ORDER/ADMIN: HCPCS | Performed by: NURSE PRACTITIONER

## 2022-03-03 PROCEDURE — 3017F COLORECTAL CA SCREEN DOC REV: CPT | Performed by: NURSE PRACTITIONER

## 2022-03-03 PROCEDURE — G8427 DOCREV CUR MEDS BY ELIG CLIN: HCPCS | Performed by: NURSE PRACTITIONER

## 2022-03-03 PROCEDURE — 1036F TOBACCO NON-USER: CPT | Performed by: NURSE PRACTITIONER

## 2022-03-03 PROCEDURE — 99214 OFFICE O/P EST MOD 30 MIN: CPT | Performed by: NURSE PRACTITIONER

## 2022-03-03 ASSESSMENT — ENCOUNTER SYMPTOMS
BACK PAIN: 1
RESPIRATORY NEGATIVE: 1
EYES NEGATIVE: 1
GASTROINTESTINAL NEGATIVE: 1

## 2022-03-03 NOTE — PROGRESS NOTES
[facility] [address]  Dept: [phone]  Dept Fax: [phone]: [phone]    Visit Date: 3/3/2022    Functionality Assessment/Goals Worksheet     On a scale of 0 (Does not Interfere) to 10 (Completely Interferes)     1. Which number describes how during the past week pain has interfered with       the following:  A. General Activity:  10  B. Mood: 10  C. Walking Ability:  10  D. Normal Work (Includes both work outside the home and housework):  8  E. Relations with Other People:   0  F. Sleep:   7  G. Enjoyment of Life:   7    2. Patient Prefers to Take their Pain Medications:     [x]  On a regular basis   []  Only when necessary    []  Does not take pain medications    3. What are the Patient's Goals/Expectations for Visiting Pain Management? []  Learn about my pain    [x]  Receive Medication   [x]  Physical Therapy     []  Treat Depression   [x]  Receive Injections    []  Treat Sleep   []  Deal with Anxiety and Stress   []  Treat Opoid Dependence/Addiction   []  Other:      HPI:   Alfredo Timmons is a 46 y.o. male is here today for    Chief Complaint: Low back and leg pain     HPI   Patient has not FU since July 20 th 2020. Never had L-facet MBB that was ordered that time as he had COVID in September 2020 and was in the hospital for 40 days and then [facility] for another 4 months. Sent back here from neurosurgery      Patient has pain in low back bilaterally constant aching pain and pain down bilateral legs foot to calf- sharp and tingling pain   Patient reports that pain is equally bothersome in low back and legs   Patient is a diabetic  Was not able to get EMG in past d/t leg swelling   Also has joint pains and follows with Rheumatology   In wheel chair today also has mila d cane and states that he uses rolling walker his house.      Pain limits  walking, standing and activity and patient needs frequent rest breaks. Pain increases with bending, lifting, twisting , walking, standing, getting up and down and housework or working at job. He is on Norco 5/325 QID, Also on Lyrica 150 mg TID prn    Medications reviewed. Patient denies side effects with medications. Patient states he is taking medications as prescribed. Hedenies receiving pain medications from other sources. Pain scale with out pain medications or at its worst is 8-9/10. Pain scale with pain medications or at its best is 6/10. Last dose of Norco and Lyrica was yesterday     Lumbar MRI:  FINDINGS:   There is biconcave appearance of the lumbar vertebrae of L2-L5, stable compared to prior exam. There is otherwise anatomic vertebral body height and alignment. Marrow signal is normal. There is congenital spinal canal narrowing throughout the lumbar    spine. The conus terminates in a normal fashion at the L1 level. Paraspinal soft tissues are unremarkable. At T12-L1 there is mild congenital spinal canal. There is mild right neural foraminal stenosis in association with mild osteophyte and facet hypertrophy and ligamentum flavum thickening. At L1-2 there is mild congenital spinal canal narrowing. There is mild bilateral neural foraminal stenosis in association with facet hypertrophy and ligamentum flavum thickening. At L2-3 there is moderate congenital spinal canal narrowing. There is mild bilateral neural foraminal stenosis in association with facet hypertrophy and ligamentum flavum thickening. At L3-4 there is moderate spinal canal stenosis from congenital spinal canal narrowing and facet hypertrophy and ligamentum flavum thickening with mildly prominent epidural fat. There is near complete effacement of CSF from the thecal sac at this level. There is mild bilateral neural foraminal stenosis.    At L4-5 there is a minimal disc bulge which contributes to moderate spinal canal stenosis in association with congenital spinal canal narrowing, facet hypertrophy and ligament flavum thickening and mildly prominent epidural fat. There is near complete    effacement of CSF from the thecal sac at this level. There is mild to moderate bilateral foraminal stenosis. At L5-S1 there is mild congenital spinal canal narrowing. There is mild to moderate bilateral foraminal stenosis in association with facet hypertrophy and ligamentum flavum thickening.           Impression       1. Congenital spinal canal narrowing with superimposed facet hypertrophy and ligamentum flavum thickening with up to moderate spinal canal stenosis at L3-4 and L4-5 and up to mild to moderate neural foraminal stenosis at the L4-5 and L5-S1 levels. 2. Biconcave appearance of L2-L5 vertebral bodies with scalloping of the superior and inferior endplates likely on the basis of osteoporosis. There is no edema to suggest an acute process.         Lumbar CT scan:   FINDINGS:           The lumbar vertebral bodies are normally aligned.  There are no compression fractures.  No pars defects are noted.  There is a congenitally narrow AP diameter of the lumbar spinal canal.       There are no gross abnormalities within the spinal canal.       On the axial images, at T8-9 disc no disc herniation, canal or foraminal stenosis.       At T9-T10 there is mild canal and mild-to-moderate bilateral foraminal stenosis.       At T10-11 there is mild canal and mild-to-moderate bilateral foraminal stenosis.       At T11-12 is mild canal and mild-to-moderate bilateral foraminal stenosis.       At T12-L1 there is mild canal and mild-to-moderate bilateral foraminal stenosis.       At L1-L2, there is mild canal and mild-to-moderate bilateral foraminal stenosis.       At L2-3, there is mild-to-moderate canal and bilateral foraminal stenosis.       At L3-4, there is mild-to-moderate canal and bilateral foraminal stenosis.       At L4-5, there is mild-to-moderate canal and moderate bilateral foraminal stenosis       At L5-S1, there is mild canal and moderate severe bilateral foraminal stenosis.       There is degenerative change involving the sacroiliac joints bilaterally.       There is slightly increased attenuation in the subcutaneous soft tissues dorsally over the lower thoracic and lumbar spine.               Impression       1. Congenitally narrow AP diameter of the lumbar spinal canal with superimposed degenerative changes resulting in mild-to-moderate canal and moderate bilateral foraminal stenosis at L4-5.   2. There is mild canal and moderate to severe bilateral foraminal stenosis at L5-S1.   3. There is mild/moderate canal and bilateral foraminal stenosis at L2-3 and L3-4.   4. There is mild canal and mild-to-moderate bilateral foraminal stenosis at T9-T10, T10-11, T11-12, T12-L1 and L1-2.   5. There is degenerative change involving the sacroiliac joints bilaterally. 6. Slightly increased attenuation in the subcutaneous soft tissues dorsally over the lower thoracic and lumbar spine.           The patientis allergic to penicillins. Subjective:      Review of Systems   Constitutional: Positive for activity change and fatigue. Negative for appetite change, chills, diaphoresis, fever and unexpected weight change. HENT: Negative. Eyes: Negative. Respiratory: Negative. Cardiovascular: Positive for leg swelling. Negative for chest pain. Gastrointestinal: Negative. Genitourinary: Negative. Musculoskeletal: Positive for arthralgias, back pain, gait problem, joint swelling and myalgias. Negative for neck pain and neck stiffness. Skin: Negative. Neurological: Positive for weakness and numbness. Psychiatric/Behavioral: Negative. Objective:     Vitals:    03/03/22 0831   BP: 136/80   Weight: 290 lb (131.5 kg)   Height: 5' 8\" (1.727 m)       Physical Exam  Constitutional:       Appearance: Normal appearance.    HENT:      Head: Normocephalic and atraumatic. Right Ear: External ear normal.      Left Ear: External ear normal.      Nose: Nose normal.      Mouth/Throat:      Mouth: Mucous membranes are moist.      Pharynx: Oropharynx is clear. Eyes:      General:         Right eye: No discharge. Extraocular Movements: Extraocular movements intact. Conjunctiva/sclera: Conjunctivae normal.      Pupils: Pupils are equal, round, and reactive to light. Cardiovascular:      Rate and Rhythm: Normal rate and regular rhythm. Pulses: Decreased pulses. Pulmonary:      Effort: Pulmonary effort is normal.      Breath sounds: Normal breath sounds. Abdominal:      General: Abdomen is flat. Bowel sounds are normal.   Musculoskeletal:         General: Tenderness present. Right shoulder: Decreased range of motion. Left shoulder: Decreased range of motion. Right wrist: Decreased range of motion. Right hand: Decreased range of motion. Cervical back: Normal range of motion and neck supple. Tenderness and bony tenderness present. Lumbar back: Tenderness and bony tenderness present. Decreased range of motion. Positive right straight leg raise test and positive left straight leg raise test.        Back:       Right hip: Tenderness and bony tenderness present. Decreased range of motion. Decreased strength. Left hip: Tenderness and bony tenderness present. Decreased range of motion. Decreased strength. Right upper leg: Tenderness and bony tenderness present. Right knee: Swelling present. Decreased range of motion. Tenderness present. Left knee: Swelling present. Decreased range of motion. Tenderness present. Right lower leg: Swelling, tenderness and bony tenderness present. 2+ Pitting Edema present. Left lower leg: Swelling, tenderness and bony tenderness present. 2+ Pitting Edema present. Right ankle: Swelling present. Tenderness present. Decreased range of motion.       Left ankle: Swelling present. Tenderness present. Decreased range of motion. Left foot: Decreased range of motion. Tenderness present. Skin:     General: Skin is warm and dry. Neurological:      General: No focal deficit present. Mental Status: He is alert and oriented to person, place, and time. Sensory: Sensory deficit present. Motor: Weakness present. Coordination: Coordination abnormal.      Gait: Gait abnormal.      Deep Tendon Reflexes:      Reflex Scores:       Tricep reflexes are 2+ on the right side and 2+ on the left side. Bicep reflexes are 2+ on the right side and 2+ on the left side. Brachioradialis reflexes are 2+ on the right side and 2+ on the left side. Patellar reflexes are 1+ on the right side and 1+ on the left side. Achilles reflexes are 1+ on the right side and 1+ on the left side. Comments: Strength 3- 4/5 bilateral lower extremities     Numbness and decreased sensation bilateral feet. Psychiatric:         Mood and Affect: Mood normal.       BIGG  Patricks test  positive  Yeoman's  or Gaenslen;s positive         Assessment:     1. Lumbar spondylosis    2. Lumbar facet arthropathy    3. Lumbosacral radiculitis    4. Spinal stenosis of lumbar region with neurogenic claudication    5. Arthralgia, unspecified joint    6. Chronic pain syndrome            Plan:      · OARRS reviewed. Current MED: 30.00  · Patient was offered naloxone for home. · Discussed long term side effects of medications, tolerance, dependency and addiction. · Previous UDS reviewed  · UDS preformed today for compliance. · Patient told can not receive any pain medications from any other source. · No evidence of abuse, diversion or aberrant behavior.    Medications and/or procedures to improve function and quality of life- patient understanding with this and that may not be pain free   Discussed with patient about safe storage of medications at home   Discussed possible weaning of medication dosing dependent on treatment/procedure results.  Discussed with patient about risks with procedure including infection, reaction to medication, increased pain, or bleeding.  Reviewed neurosurgery notes, and CT scan and Lumbar MRI   · Plan bilateral L-facet MBB # 1 @ L4-5 and L5-S1 with any provider for diagnostic purpose. Procedure and risks discussed in detail with patient.  Discussed possible LESI, TFLESI in future    Continue medications from [facility] and Lyrica   If patient is on blood thinners will need approval to hold yes- On Eliquis needs cardiology clearance        Meds. Prescribed:   No orders of the defined types were placed in this encounter. Return for bilateral L-facet MBB # 1 @ L4-5 and L5-S1 with any provider. FU after.                Electronically signed by SANDRA Reeder CNP on3/3/2022 at 9:16 AM

## 2022-03-04 ENCOUNTER — APPOINTMENT (OUTPATIENT)
Dept: GENERAL RADIOLOGY | Age: 52
End: 2022-03-04
Payer: MEDICARE

## 2022-03-04 ENCOUNTER — HOSPITAL ENCOUNTER (EMERGENCY)
Age: 52
Discharge: HOME | End: 2022-03-04
Attending: EMERGENCY MEDICINE
Payer: MEDICARE

## 2022-03-04 VITALS
HEART RATE: 65 BPM | WEIGHT: 300 LBS | SYSTOLIC BLOOD PRESSURE: 165 MMHG | OXYGEN SATURATION: 97 % | DIASTOLIC BLOOD PRESSURE: 96 MMHG | RESPIRATION RATE: 27 BRPM | TEMPERATURE: 98.4 F | BODY MASS INDEX: 45.47 KG/M2 | HEIGHT: 68 IN

## 2022-03-04 DIAGNOSIS — R42 DIZZINESS: Primary | ICD-10-CM

## 2022-03-04 LAB
ALBUMIN SERPL-MCNC: 4 G/DL (ref 3.5–5.1)
ALP BLD-CCNC: 159 U/L (ref 38–126)
ALT SERPL-CCNC: 46 U/L (ref 11–66)
ANION GAP SERPL CALCULATED.3IONS-SCNC: 11 MEQ/L (ref 8–16)
AST SERPL-CCNC: 67 U/L (ref 5–40)
BASOPHILS # BLD: 0.5 %
BASOPHILS ABSOLUTE: 0 THOU/MM3 (ref 0–0.1)
BILIRUB SERPL-MCNC: 0.6 MG/DL (ref 0.3–1.2)
BILIRUBIN DIRECT: < 0.2 MG/DL (ref 0–0.3)
BILIRUBIN URINE: NEGATIVE
BLOOD, URINE: NEGATIVE
BUN BLDV-MCNC: 18 MG/DL (ref 7–22)
CALCIUM SERPL-MCNC: 9.2 MG/DL (ref 8.5–10.5)
CHARACTER, URINE: CLEAR
CHLORIDE BLD-SCNC: 102 MEQ/L (ref 98–111)
CO2: 28 MEQ/L (ref 23–33)
COLOR: YELLOW
CREAT SERPL-MCNC: 1 MG/DL (ref 0.4–1.2)
EKG ATRIAL RATE: 72 BPM
EKG P AXIS: 66 DEGREES
EKG P-R INTERVAL: 160 MS
EKG Q-T INTERVAL: 416 MS
EKG QRS DURATION: 82 MS
EKG QTC CALCULATION (BAZETT): 455 MS
EKG R AXIS: -26 DEGREES
EKG T AXIS: -37 DEGREES
EKG VENTRICULAR RATE: 72 BPM
EOSINOPHIL # BLD: 2.3 %
EOSINOPHILS ABSOLUTE: 0.2 THOU/MM3 (ref 0–0.4)
ERYTHROCYTE [DISTWIDTH] IN BLOOD BY AUTOMATED COUNT: 14.7 % (ref 11.5–14.5)
ERYTHROCYTE [DISTWIDTH] IN BLOOD BY AUTOMATED COUNT: 49.7 FL (ref 35–45)
GLUCOSE BLD-MCNC: 238 MG/DL (ref 70–108)
GLUCOSE URINE: 500 MG/DL
HCT VFR BLD CALC: 49.5 % (ref 42–52)
HEMOGLOBIN: 16 GM/DL (ref 14–18)
IMMATURE GRANS (ABS): 0.04 THOU/MM3 (ref 0–0.07)
IMMATURE GRANULOCYTES: 0.5 %
KETONES, URINE: NEGATIVE
LEUKOCYTE ESTERASE, URINE: NEGATIVE
LYMPHOCYTES # BLD: 15.9 %
LYMPHOCYTES ABSOLUTE: 1.3 THOU/MM3 (ref 1–4.8)
MCH RBC QN AUTO: 30.1 PG (ref 26–33)
MCHC RBC AUTO-ENTMCNC: 32.3 GM/DL (ref 32.2–35.5)
MCV RBC AUTO: 93 FL (ref 80–94)
MONOCYTES # BLD: 6.1 %
MONOCYTES ABSOLUTE: 0.5 THOU/MM3 (ref 0.4–1.3)
NITRITE, URINE: NEGATIVE
NUCLEATED RED BLOOD CELLS: 0 /100 WBC
OSMOLALITY CALCULATION: 290.9 MOSMOL/KG (ref 275–300)
PH UA: 5.5 (ref 5–9)
PLATELET # BLD: 209 THOU/MM3 (ref 130–400)
PMV BLD AUTO: 11.3 FL (ref 9.4–12.4)
POTASSIUM REFLEX MAGNESIUM: 4.2 MEQ/L (ref 3.5–5.2)
PRO-BNP: 37.6 PG/ML (ref 0–900)
PROTEIN UA: NEGATIVE
RBC # BLD: 5.32 MILL/MM3 (ref 4.7–6.1)
SEG NEUTROPHILS: 74.7 %
SEGMENTED NEUTROPHILS ABSOLUTE COUNT: 6.2 THOU/MM3 (ref 1.8–7.7)
SODIUM BLD-SCNC: 141 MEQ/L (ref 135–145)
SPECIFIC GRAVITY, URINE: 1.01 (ref 1–1.03)
TOTAL PROTEIN: 7.2 G/DL (ref 6.1–8)
TROPONIN T: < 0.01 NG/ML
UROBILINOGEN, URINE: 0.2 EU/DL (ref 0–1)
WBC # BLD: 8.3 THOU/MM3 (ref 4.8–10.8)

## 2022-03-04 PROCEDURE — 80048 BASIC METABOLIC PNL TOTAL CA: CPT

## 2022-03-04 PROCEDURE — 85025 COMPLETE CBC W/AUTO DIFF WBC: CPT

## 2022-03-04 PROCEDURE — 99285 EMERGENCY DEPT VISIT HI MDM: CPT

## 2022-03-04 PROCEDURE — 81003 URINALYSIS AUTO W/O SCOPE: CPT

## 2022-03-04 PROCEDURE — 93005 ELECTROCARDIOGRAM TRACING: CPT | Performed by: STUDENT IN AN ORGANIZED HEALTH CARE EDUCATION/TRAINING PROGRAM

## 2022-03-04 PROCEDURE — 83880 ASSAY OF NATRIURETIC PEPTIDE: CPT

## 2022-03-04 PROCEDURE — 71045 X-RAY EXAM CHEST 1 VIEW: CPT

## 2022-03-04 PROCEDURE — 80076 HEPATIC FUNCTION PANEL: CPT

## 2022-03-04 PROCEDURE — 84484 ASSAY OF TROPONIN QUANT: CPT

## 2022-03-04 ASSESSMENT — PAIN DESCRIPTION - LOCATION: LOCATION: BACK;LEG

## 2022-03-04 ASSESSMENT — ENCOUNTER SYMPTOMS
COUGH: 0
SINUS PRESSURE: 0
EYE DISCHARGE: 0
ABDOMINAL PAIN: 0
CHEST TIGHTNESS: 0
BLOOD IN STOOL: 0
RECTAL PAIN: 0
SINUS PAIN: 0
FACIAL SWELLING: 0
SHORTNESS OF BREATH: 0
EYE REDNESS: 0
ANAL BLEEDING: 0
APNEA: 0
CHOKING: 0
EYE PAIN: 0
PHOTOPHOBIA: 0

## 2022-03-04 ASSESSMENT — PAIN - FUNCTIONAL ASSESSMENT
PAIN_FUNCTIONAL_ASSESSMENT: 0-10
PAIN_FUNCTIONAL_ASSESSMENT: 0-10

## 2022-03-04 ASSESSMENT — PAIN DESCRIPTION - PAIN TYPE: TYPE: CHRONIC PAIN

## 2022-03-04 ASSESSMENT — PAIN SCALES - GENERAL: PAINLEVEL_OUTOF10: 8

## 2022-03-04 ASSESSMENT — PAIN DESCRIPTION - ORIENTATION: ORIENTATION: RIGHT;LEFT;LOWER

## 2022-03-04 NOTE — ED NOTES
Pt. Resting in bed with even and unlabored respirations and eyes closed. Pt. Updated about plan of care and treatment. Family at bedside. Pt. Has no further concerns, questions or needs at this time. Call light within reach.         Hunter Bazzi RN  03/04/22 3108

## 2022-03-04 NOTE — ED PROVIDER NOTES
[facility] ENCOUNTER          Pt Name: Shayan Harper  MRN: [id number]  [place] 1970  Date of evaluation: 3/4/2022  Treating Resident Physician: Jolyn Blizzard, MD  Supervising Physician: Miguel A Deluca MD    [address]       Chief Complaint   Patient presents with    Dizziness     History obtained from the patient. HISTORY OF PRESENT ILLNESS    HPI  Shayan Harper is a 46 y.o. male who presents to the emergency department for evaluation of dizziness. Patient states the onset of dizziness started this morning. Patient woke up feeling disoriented and dizzy. Proceeded to have a large bowel movement in which patient states that he has been feeling worse since the event. He states that he tried to get up and was dizzy, weak and wife urged him to come to the emergency room. Patient denies any chest pain or shortness of breath. Patient has a past medical history of diabetes, glucose 209 for EMS. Past medical history of Covid in September 2019 in which patient states he was on the vent for 40 days. During the event patient had A. fib, DVT. Patient currently taking Eliquis. Past medical history of KIARA. Patient stating that he is currently not on his CPAP machine due to insurance changes. Will be getting his CPAP machine back this week. The patient has no other acute complaints at this time. REVIEW OF SYSTEMS   Review of Systems   Constitutional: Negative for activity change, chills, diaphoresis and fatigue. HENT: Negative for congestion, ear discharge, ear pain, facial swelling, hearing loss, sinus pressure and sinus pain. Eyes: Negative for photophobia, pain, discharge and redness. Respiratory: Negative for apnea, cough, choking, chest tightness and shortness of breath. Cardiovascular: Negative for chest pain and leg swelling.    Gastrointestinal: Negative for abdominal pain, anal bleeding, blood in stool and rectal pain.   Endocrine: Negative for polydipsia, polyphagia and polyuria. Genitourinary: Negative for dysuria, flank pain, genital sores and hematuria. Musculoskeletal: Negative for gait problem, joint swelling, myalgias, neck pain and neck stiffness. Skin: Negative for pallor, rash and wound. Neurological: Positive for dizziness. Negative for tremors, seizures, syncope, facial asymmetry and headaches. Hematological: Negative for adenopathy. Psychiatric/Behavioral: Negative for agitation, behavioral problems, hallucinations, self-injury and suicidal ideas.      PAST MEDICAL AND SURGICAL HISTORY     Past Medical History:   Diagnosis Date    Aortic stenosis, mild to moderate     BPH (benign prostatic hyperplasia)     Carpal tunnel syndrome on right     rt hand    Chronic gout     COVID-19 09/2020    DM2 (diabetes mellitus, type 2) ([address]     Dyslipidemia     GERD (gastroesophageal reflux disease)     Heart failure with preserved ejection fraction (HCC)     History of alcohol abuse     History of atrial fibrillation     History of osteomyelitis     Hx of R foot wound, osteomyelitis July 2018 requiring surgery and IV Vanco    Hyperlipidemia     Hypertension, essential     Hypogonadism, male     Major depression     Mood disorder ([address]     Morbid obesity ([address]     DURAN (nonalcoholic steatohepatitis)     KIARA treated with BiPAP     Vitamin D deficiency      Past Surgical History:   Procedure Laterality Date    COLONOSCOPY N/A 11/15/2020    COLONOSCOPY DIAGNOSTIC performed by Clarence Smith MD at [facility]    ENDOSCOPY, COLON, DIAGNOSTIC      FOOT SURGERY Right     2018, R foot osteomyelitis    HIP SURGERY Left 6/29/2020    bilateral hip steroid injection, Left HIP FIRST performed by Chantale Bourne MD at [address] [facility] N/A 10/26/2020    SIGMOIDOSCOPY DIAGNOSTIC FLEXIBLE performed by Kay Hardin MD at [facility] as a baby    UPPER GASTROINTESTINAL ENDOSCOPY N/A 11/14/2020    EGD DIAGNOSTIC ONLY performed by Roverto Tom MD at [facility] N/A 12/27/2021    EGD performed by Roverto Tom MD at [address] [place] [address]   No current facility-administered medications for this encounter. Current Outpatient Medications:     HYDROcodone-acetaminophen (NORCO) 5-325 MG per tablet, Take 1 tablet by mouth every 6 hours as needed for Pain for up to 7 days. Intended supply: 3 days. Take lowest dose possible to manage pain, Disp: 28 tablet, Rfl: 0    pantoprazole (PROTONIX) 40 MG tablet, Take 1 tablet by mouth 2 times daily (before meals), Disp: 60 tablet, Rfl: 5    pregabalin (LYRICA) 50 MG capsule, Take 50 mg by mouth 3 times daily. , Disp: , Rfl:     clotrimazole-betamethasone (LOTRISONE) 1-0.05 % cream, Apply topically 2 times daily. , Disp: 1 each, Rfl: 0    potassium chloride (KLOR-CON M) 20 MEQ extended release tablet, Take 1 tablet by mouth daily, Disp: 30 tablet, Rfl: 2    apixaban (ELIQUIS) 5 MG TABS tablet, Take 10mg by mouth two times daily for 6 days followed by 5mg by mouth two times daily, Disp: 60 tablet, Rfl: 1    glycopyrrolate-formoterol (BEVESPI) 9-4.8 MCG/ACT AERO, Inhale 2 puffs into the lungs 2 times daily, Disp: 10.7 g, Rfl: 1    ferrous sulfate (IRON 325) 325 (65 Fe) MG tablet, Take 1 tablet by mouth 2 times daily (with meals), Disp: 30 tablet, Rfl: 3    albuterol sulfate HFA (PROVENTIL HFA) 108 (90 Base) MCG/ACT inhaler, Inhale 2 puffs into the lungs every 6 hours as needed for Wheezing, Disp: 1 Inhaler, Rfl: 3    vitamin C (ASCORBIC ACID) 500 MG tablet, Take 2 tablets by mouth daily, Disp: 30 tablet, Rfl: 0    CHOLECALCIFEROL PO, Take 2,000 Units by mouth daily, Disp: , Rfl:     atorvastatin (LIPITOR) 40 MG tablet, Take 40 mg by mouth nightly, Disp: , Rfl:     acetaminophen (TYLENOL) 325 MG tablet, Take 650 mg by mouth every 4 hours as needed for Pain, Disp: , Rfl:     busPIRone (BUSPAR) 10 MG tablet, Take 10 mg by mouth 2 times daily , Disp: , Rfl:     allopurinol (ZYLOPRIM) 300 MG tablet, Take 1 tablet by mouth daily, Disp: 90 tablet, Rfl: 0    Multiple Vitamins-Minerals (THERAPEUTIC MULTIVITAMIN-MINERALS) tablet, Take 1 tablet by mouth daily, Disp: , Rfl:     Probiotic Product (SOBIA-BID PROBIOTIC PO), Take 1 tablet by mouth daily , Disp: , Rfl:     ondansetron (ZOFRAN) 4 MG tablet, Take 4 mg by mouth every 8 hours as needed for Nausea or Vomiting, Disp: , Rfl:     tamsulosin (FLOMAX) 0.4 MG capsule, Take 0.4 mg by mouth nightly , Disp: , Rfl:     folic acid (FOLVITE) 1 MG tablet, Take 1 mg by mouth daily, Disp: , Rfl:     furosemide (LASIX) 40 MG tablet, Take 40 mg by mouth daily , Disp: , Rfl:     sitaGLIPtan-metformin (JANUMET)  MG per tablet, Take 1 tablet by mouth 2 times daily (with meals) , Disp: , Rfl:     senna (SENOKOT) 8.6 MG tablet, Take 1 tablet by mouth 2 times daily, Disp: , Rfl:     ARIPiprazole (ABILIFY PO), Take 15 mg by mouth daily , Disp: , Rfl:     Citalopram Hydrobromide (CELEXA PO), Take 30 mg by mouth daily From [employer] , Disp: , Rfl:     Blood Glucose Monitoring Suppl (FREESTYLE LITE) ARTIE, Patient needs all supplies for qd testing.  DX: 250.00, Disp: 1 Device, Rfl: 11      SOCIAL HISTORY     Social History     Social History Narrative    Not on file     Social History     Tobacco Use    Smoking status: Never Smoker    Smokeless tobacco: Never Used   Vaping Use    Vaping Use: Never used   Substance Use Topics    Alcohol use: Not Currently     Comment: hx of abuse    Drug use: No         ALLERGIES     Allergies   Allergen Reactions    Penicillins      Tolerated Zosyn 9/24/2020     FAMILY HISTORY     Family History   Problem Relation Age of Onset    Heart Disease Mother         MI    Cancer Paternal Aunt         lung         PREVIOUS RECORDS   Previous records reviewed: Patient was seen in the emergency room on 2/25/2022 due to due to acute exacerbation of chronic back pain. PHYSICAL EXAM     ED Triage Vitals [03/04/22 1103]   BP Temp Temp Source Pulse Resp SpO2 Height Weight   (!) 143/84 98.4 °F (36.9 °C) Temporal 74 18 97 % -- --     Initial vital signs and nursing assessment reviewed and abnormal from Hypertension. Body mass index is 45.61 kg/m². Pulsoximetry is normal per my interpretation. Additional Vital Signs:  Vitals:    03/04/22 1103   BP: (!) 143/84   Pulse: 74   Resp: 18   Temp: 98.4 °F (36.9 °C)   SpO2: 97%       Physical Exam  Constitutional:       Appearance: He is obese. He is not diaphoretic. HENT:      Head: Normocephalic and atraumatic. Right Ear: Tympanic membrane and external ear normal.      Left Ear: Tympanic membrane and external ear normal.      Nose: Nose normal. No congestion or rhinorrhea. Mouth/Throat:      Pharynx: No oropharyngeal exudate or posterior oropharyngeal erythema. Eyes:      General: No scleral icterus. Right eye: No discharge. Left eye: No discharge. Extraocular Movements: Extraocular movements intact. Conjunctiva/sclera: Conjunctivae normal.      Pupils: Pupils are equal, round, and reactive to light. Neck:      Vascular: No carotid bruit. Cardiovascular:      Rate and Rhythm: Normal rate and regular rhythm. Pulses: Normal pulses. Heart sounds: Normal heart sounds. No murmur heard. No friction rub. No gallop. Pulmonary:      Effort: Pulmonary effort is normal. No respiratory distress. Breath sounds: Normal breath sounds. No stridor. No wheezing. Chest:      Chest wall: No tenderness. Abdominal:      General: Abdomen is flat. Bowel sounds are normal. There is no distension. Palpations: Abdomen is soft. There is no mass. Tenderness: There is no abdominal tenderness. There is no guarding or rebound. Hernia: No hernia is present.    Musculoskeletal:         General: Notable for the following components:    Alkaline Phosphatase 159 (*)     AST 67 (*)     All other components within normal limits   URINALYSIS WITH REFLEX TO CULTURE - Abnormal; Notable for the following components:    Glucose, Ur 500 (*)     All other components within normal limits   TROPONIN   BRAIN NATRIURETIC PEPTIDE   ANION GAP   GLOMERULAR FILTRATION RATE, ESTIMATED   OSMOLALITY       Radiologic studies results:  XR CHEST PORTABLE   Final Result      No acute intrathoracic process. **This report has been created using voice recognition software. It may contain minor errors which are inherent in voice recognition technology. **      Final report electronically signed by Dr. Pao Nieves on 3/4/2022 11:44 AM          ED Medications administered this visit: Medications - No data to display      ED COURSE     ED Course as of 03/04/22 1220   Fri Mar 04, 2022   1149 XR: No acute intrathoracic process. [EL]   1216 Troponin T: < 0.010 [EL]   1217 Pro-BNP: 37.6 [EL]   1217 Patient seen sleeping and snoring. Stating that he feels better after getting some rest. [EL]      ED Course User Index  [EL] Swetha Wolfe MD     Strict return precautions and follow up instructions were discussed with the patient prior to discharge, with which the patient agrees. MEDICATION CHANGES     New Prescriptions    No medications on file         FINAL DISPOSITION     Final diagnoses:   Dizziness     Condition: condition: stable  Dispo: Discharge to home      This transcription was electronically signed. Parts of this transcriptions may have been dictated by use of voice recognition software and electronically transcribed, and parts may have been transcribed with the assistance of an ED scribe. The transcription may contain errors not detected in proofreading. Please refer to my supervising physician's documentation if my documentation differs.     Electronically Signed: Dayna Martínez MD, 03/04/22, 12:20 PM Evan Vasquez MD  Resident  03/04/22 2402

## 2022-03-04 NOTE — ED NOTES
Pt arrives to the ED for dizziness that began this morning and worsened after having a bowel movement. Pt denies any other symptoms such has n/v, chest pain or sob. Pt states he has chronic pain rating a 8/10 in his back and legs. Pt VSS. Pt respirations even and unlabored.   Dayna Wagoner RN  03/04/22 [id number]

## 2022-03-04 NOTE — ED PROVIDER NOTES
ATTENDING NOTE:    I supervised and discussed the history, physical exam and the management of this patient with the resident. I reviewed the resident's note and agree with the documented findings and plan of care. Please see my additional note. Patient presents to the emergency department for lightheadedness. He felt slightly lightheaded when he woke up, then went to the bathroom and had what he describes as a large bowel movement, no black or bloody stools. Afterward he did feel more lightheaded. He has no complaints of chest pain, headache, abdominal pain, syncope, palpitations. No difficulty breathing, fever, black or bloody stools. He is supposed to be using a CPAP machine but has not received it, and is due to receive it next week. He denies any spinning sensation or falls recently. No focal numbness, tingling, or weakness. Upon my exam, patient is in bed snoring asleep. He is easily aroused, conversant and answers questions appropriately. He states he feels much better now. Labs, EKG and studies reviewed. I personally saw and examined the patient. I have reviewed and agree with the resident's findings, including all diagnostic interpretations and treatment plans as written. I was present for the key portion of any procedures performed and the inclusive time noted in any critical care statement.     Electronically verified by Matha Nageotte, MD  03/04/22 1278

## 2022-03-04 NOTE — ED NOTES
Discharge instructions and new medications discussed and explained with Pt. Pt. Verbalized understanding and has no further questions or needs at this time.         Haris Skinner RN  03/04/22 7452

## 2022-03-10 ENCOUNTER — APPOINTMENT (OUTPATIENT)
Dept: GENERAL RADIOLOGY | Age: 52
End: 2022-03-10
Payer: MEDICARE

## 2022-03-10 ENCOUNTER — HOSPITAL ENCOUNTER (EMERGENCY)
Age: 52
Discharge: HOME | End: 2022-03-11
Attending: EMERGENCY MEDICINE
Payer: MEDICARE

## 2022-03-10 VITALS
RESPIRATION RATE: 18 BRPM | WEIGHT: 290 LBS | BODY MASS INDEX: 43.95 KG/M2 | HEART RATE: 77 BPM | TEMPERATURE: 98.3 F | HEIGHT: 68 IN | OXYGEN SATURATION: 100 % | DIASTOLIC BLOOD PRESSURE: 74 MMHG | SYSTOLIC BLOOD PRESSURE: 135 MMHG

## 2022-03-10 DIAGNOSIS — R07.9 CHEST PAIN, UNSPECIFIED TYPE: Primary | ICD-10-CM

## 2022-03-10 LAB
ANION GAP SERPL CALCULATED.3IONS-SCNC: 11 MEQ/L (ref 8–16)
BASOPHILS # BLD: 0.3 %
BASOPHILS ABSOLUTE: 0 THOU/MM3 (ref 0–0.1)
BUN BLDV-MCNC: 22 MG/DL (ref 7–22)
CALCIUM SERPL-MCNC: 9.5 MG/DL (ref 8.5–10.5)
CHLORIDE BLD-SCNC: 102 MEQ/L (ref 98–111)
CO2: 25 MEQ/L (ref 23–33)
CREAT SERPL-MCNC: 1.4 MG/DL (ref 0.4–1.2)
EOSINOPHIL # BLD: 1.7 %
EOSINOPHILS ABSOLUTE: 0.2 THOU/MM3 (ref 0–0.4)
ERYTHROCYTE [DISTWIDTH] IN BLOOD BY AUTOMATED COUNT: 14.5 % (ref 11.5–14.5)
ERYTHROCYTE [DISTWIDTH] IN BLOOD BY AUTOMATED COUNT: 47.3 FL (ref 35–45)
GLUCOSE BLD-MCNC: 130 MG/DL (ref 70–108)
HCT VFR BLD CALC: 46 % (ref 42–52)
HEMOGLOBIN: 15.4 GM/DL (ref 14–18)
IMMATURE GRANS (ABS): 0.05 THOU/MM3 (ref 0–0.07)
IMMATURE GRANULOCYTES: 0.5 %
LYMPHOCYTES # BLD: 15.7 %
LYMPHOCYTES ABSOLUTE: 1.5 THOU/MM3 (ref 1–4.8)
MCH RBC QN AUTO: 30.4 PG (ref 26–33)
MCHC RBC AUTO-ENTMCNC: 33.5 GM/DL (ref 32.2–35.5)
MCV RBC AUTO: 90.7 FL (ref 80–94)
MONOCYTES # BLD: 7.5 %
MONOCYTES ABSOLUTE: 0.7 THOU/MM3 (ref 0.4–1.3)
NUCLEATED RED BLOOD CELLS: 0 /100 WBC
OSMOLALITY CALCULATION: 280.8 MOSMOL/KG (ref 275–300)
PLATELET # BLD: 204 THOU/MM3 (ref 130–400)
PMV BLD AUTO: 11.5 FL (ref 9.4–12.4)
POTASSIUM REFLEX MAGNESIUM: 4.2 MEQ/L (ref 3.5–5.2)
PRO-BNP: 169.9 PG/ML (ref 0–900)
RBC # BLD: 5.07 MILL/MM3 (ref 4.7–6.1)
SEG NEUTROPHILS: 74.3 %
SEGMENTED NEUTROPHILS ABSOLUTE COUNT: 7.1 THOU/MM3 (ref 1.8–7.7)
SODIUM BLD-SCNC: 138 MEQ/L (ref 135–145)
TROPONIN T: < 0.01 NG/ML
TROPONIN T: < 0.01 NG/ML
WBC # BLD: 9.6 THOU/MM3 (ref 4.8–10.8)

## 2022-03-10 PROCEDURE — 93005 ELECTROCARDIOGRAM TRACING: CPT | Performed by: EMERGENCY MEDICINE

## 2022-03-10 PROCEDURE — 85025 COMPLETE CBC W/AUTO DIFF WBC: CPT

## 2022-03-10 PROCEDURE — 80048 BASIC METABOLIC PNL TOTAL CA: CPT

## 2022-03-10 PROCEDURE — 84484 ASSAY OF TROPONIN QUANT: CPT

## 2022-03-10 PROCEDURE — 83880 ASSAY OF NATRIURETIC PEPTIDE: CPT

## 2022-03-10 PROCEDURE — 71045 X-RAY EXAM CHEST 1 VIEW: CPT

## 2022-03-10 PROCEDURE — 36415 COLL VENOUS BLD VENIPUNCTURE: CPT

## 2022-03-10 PROCEDURE — 99284 EMERGENCY DEPT VISIT MOD MDM: CPT

## 2022-03-10 RX ORDER — ASPIRIN 81 MG/1
324 TABLET, CHEWABLE ORAL ONCE
Status: DISCONTINUED | OUTPATIENT
Start: 2022-03-10 | End: 2022-03-11 | Stop reason: HOSPADM

## 2022-03-10 ASSESSMENT — ENCOUNTER SYMPTOMS
NAUSEA: 0
DIARRHEA: 0
COUGH: 0
ABDOMINAL PAIN: 0
SHORTNESS OF BREATH: 0
VOMITING: 0
CONSTIPATION: 0
SORE THROAT: 0
RHINORRHEA: 0

## 2022-03-11 LAB
EKG ATRIAL RATE: 67 BPM
EKG ATRIAL RATE: 69 BPM
EKG P AXIS: 39 DEGREES
EKG P AXIS: 44 DEGREES
EKG P-R INTERVAL: 166 MS
EKG P-R INTERVAL: 170 MS
EKG Q-T INTERVAL: 432 MS
EKG Q-T INTERVAL: 440 MS
EKG QRS DURATION: 82 MS
EKG QRS DURATION: 82 MS
EKG QTC CALCULATION (BAZETT): 462 MS
EKG QTC CALCULATION (BAZETT): 464 MS
EKG R AXIS: -20 DEGREES
EKG R AXIS: -21 DEGREES
EKG T AXIS: -16 DEGREES
EKG T AXIS: 14 DEGREES
EKG VENTRICULAR RATE: 67 BPM
EKG VENTRICULAR RATE: 69 BPM

## 2022-03-11 PROCEDURE — 93010 ELECTROCARDIOGRAM REPORT: CPT | Performed by: NUCLEAR MEDICINE

## 2022-03-11 NOTE — ED PROVIDER NOTES
[address] COMPLAINT       Chief Complaint   Patient presents with    Chest Pain       Nurses Notes reviewed and I agree except as noted in the HPI. HISTORY OF PRESENT ILLNESS    Boris Steen is a 46 y.o. pleasant male who presents emergency department for constant substernal chest pressure since early this morning. Patient states he has been feeling as though he has pressure when he breathes. He also reports in general feeling exhausted since he was diagnosed with Covid in 2020. Also reports some mild generalized body aches and headache. He occasionally feels as though he is wheezing. He denies fever, chills, nausea, vomiting, diarrhea. He states his appetite has been good, eating [place] and eggs, chicken, corn and potatoes. He denies palpitations or syncope. He states since having Covid he occasionally has some waxing and waning pedal edema which is stable and at baseline. He denies any radiation of his pain. No exacerbating or alleviating factors. He is compliant with taking all of his home medications including his Eliquis. He is still waiting for CPAP machine to be delivered. Has an appointment with his PCP, Gallo López, tomorrow morning. Later in interview, patient states that these symptoms have been ongoing since Covid in 2020. No other initial complaints or concerns. REVIEW OF SYSTEMS     Review of Systems   Constitutional: Negative for diaphoresis and fever. HENT: Negative for congestion, rhinorrhea and sore throat. Eyes: Negative for visual disturbance. Respiratory: Negative for cough and shortness of breath. Cardiovascular: Negative for chest pain, palpitations and leg swelling. Gastrointestinal: Negative for abdominal pain, constipation, diarrhea, nausea and vomiting. Endocrine: Negative for polyuria. Genitourinary: Negative for dysuria. Musculoskeletal: Negative for joint swelling.    Skin: Negative for rash. Neurological: Negative for dizziness, seizures, syncope, speech difficulty, weakness, numbness and headaches. Hematological: Negative for adenopathy. Psychiatric/Behavioral: Negative for confusion and self-injury. All other systems reviewed and are negative. PAST MEDICAL HISTORY    has a past medical history of Aortic stenosis, mild to moderate, BPH (benign prostatic hyperplasia), Carpal tunnel syndrome on right, Chronic gout, COVID-19, DM2 (diabetes mellitus, type 2) ([address] Dyslipidemia, GERD (gastroesophageal reflux disease), Heart failure with preserved ejection fraction ([address] History of alcohol abuse, History of atrial fibrillation, History of osteomyelitis, Hyperlipidemia, Hypertension, essential, Hypogonadism, male, Major depression, Mood disorder ([address] Morbid obesity ([address] DURAN (nonalcoholic steatohepatitis), KIARA treated with BiPAP, and Vitamin D deficiency. SURGICAL HISTORY      has a past surgical history that includes tracheostomy; Foot surgery (Right); hip surgery (Left, 6/29/2020); sigmoidoscopy (N/A, 10/26/2020); Upper gastrointestinal endoscopy (N/A, 11/14/2020); Colonoscopy (N/A, 11/15/2020); Tonsillectomy; Upper gastrointestinal endoscopy (N/A, 12/27/2021); and Endoscopy, colon, diagnostic. CURRENT MEDICATIONS       Discharge Medication List as of 3/11/2022 12:06 AM      CONTINUE these medications which have NOT CHANGED    Details   pantoprazole (PROTONIX) 40 MG tablet Take 1 tablet by mouth 2 times daily (before meals), Disp-60 tablet, R-5Normal      pregabalin (LYRICA) 50 MG capsule Take 50 mg by mouth 3 times daily. Historical Med      clotrimazole-betamethasone (LOTRISONE) 1-0.05 % cream Apply topically 2 times daily. , Disp-1 each, R-0, Normal      potassium chloride (KLOR-CON M) 20 MEQ extended release tablet Take 1 tablet by mouth daily, Disp-30 tablet, R-2Normal      apixaban (ELIQUIS) 5 MG TABS tablet Take 10mg by mouth two times daily for 6 days followed by 5mg by mouth two times daily, Disp-60 tablet,R-1Print      glycopyrrolate-formoterol (BEVESPI) 9-4.8 MCG/ACT AERO Inhale 2 puffs into the lungs 2 times daily, Disp-10.7 g,R-1Normal      ferrous sulfate (IRON 325) 325 (65 Fe) MG tablet Take 1 tablet by mouth 2 times daily (with meals), Disp-30 tablet,R-3Normal      albuterol sulfate HFA (PROVENTIL HFA) 108 (90 Base) MCG/ACT inhaler Inhale 2 puffs into the lungs every 6 hours as needed for Wheezing, Disp-1 Inhaler,R-3Normal      vitamin C (ASCORBIC ACID) 500 MG tablet Take 2 tablets by mouth daily, Disp-30 tablet,R-0Normal      CHOLECALCIFEROL PO Take 2,000 Units by mouth dailyHistorical Med      atorvastatin (LIPITOR) 40 MG tablet Take 40 mg by mouth nightlyHistorical Med      acetaminophen (TYLENOL) 325 MG tablet Take 650 mg by mouth every 4 hours as needed for PainHistorical Med      busPIRone (BUSPAR) 10 MG tablet Take 10 mg by mouth 2 times daily Historical Med      allopurinol (ZYLOPRIM) 300 MG tablet Take 1 tablet by mouth daily, Disp-90 tablet, R-0Normal      Multiple Vitamins-Minerals (THERAPEUTIC MULTIVITAMIN-MINERALS) tablet Take 1 tablet by mouth dailyHistorical Med      Probiotic Product (SOBIA-BID PROBIOTIC PO) Take 1 tablet by mouth daily Historical Med      ondansetron (ZOFRAN) 4 MG tablet Take 4 mg by mouth every 8 hours as needed for Nausea or VomitingHistorical Med      tamsulosin (FLOMAX) 0.4 MG capsule Take 0.4 mg by mouth nightly Historical Med      folic acid (FOLVITE) 1 MG tablet Take 1 mg by mouth dailyHistorical Med      furosemide (LASIX) 40 MG tablet Take 40 mg by mouth daily Historical Med      sitaGLIPtan-metformin (JANUMET)  MG per tablet Take 1 tablet by mouth 2 times daily (with meals) Historical Med      senna (SENOKOT) 8.6 MG tablet Take 1 tablet by mouth 2 times dailyHistorical Med      ARIPiprazole (ABILIFY PO) Take 15 mg by mouth daily Historical Med      Citalopram Hydrobromide (CELEXA PO) Take 30 mg by mouth daily From [employer]      Blood Glucose Monitoring Suppl (FREESTYLE LITE) ARTIE Disp-1 Device, R-11, NormalPatient needs all supplies for qd testing. DX: 250.00             ALLERGIES     is allergic to penicillins. FAMILY HISTORY     He indicated that his mother is . He indicated that his father is alive. He indicated that his brother is alive. He indicated that his daughter is alive. He indicated that both of his sons are alive. He indicated that the status of his paternal aunt is unknown.   family history includes Cancer in his paternal aunt; Heart Disease in his mother. SOCIAL HISTORY      reports that he has never smoked. He has never used smokeless tobacco. He reports previous alcohol use. He reports that he does not use drugs. PHYSICAL EXAM     INITIAL VITALS:  height is 5' 8\" (1.727 m) and weight is 290 lb (131.5 kg). His oral temperature is 98.3 °F (36.8 °C). His blood pressure is 135/74 and his pulse is 77. His respiration is 18 and oxygen saturation is 100%. CONSTITUTIONAL: [Awake, alert, non toxic, well developed, well nourished, no acute distress]  HEAD: [Normocephalic, atraumatic]  EYES: [Pupils equal, round & reactive to light, extraocular movements intact, no nystagmus, clear conjunctiva, non-icteric sclera]  ENT: [External ear canal clear without evidence of cerumen impaction or foreign body, TM's clear without erythema or bulging. Nares patent without drainage, septum appears midline. Moist mucus membranes, oropharynx clear without exudate, erythema, or mass. Uvula midline]  NECK: [Nontender and supple. No meningismus, no appreciated lymphadenopathy. Intact full range of motion. C-spine midline without vertebral tenderness. Trachea midline.]  CHEST: [Inspection normal, no lesions, equal rise. No crepitus or tenderness upon palpation.]  CARDIOVASCULAR: [Regular rate, rhythm, normal S1 and S2. No appreciated murmurs, rubs, or gallops.  No pulse deficits appreciated. Intact distal perfusion. JVD not appreciated.]  PULMONARY: [Respiratory distress absent. Respiratory effort normal. Breath sounds clear to auscultation without rhonchi, rales, or wheezing. No accessory muscle use. No stridor]  ABDOMEN: [Inspection normal, without surgical scars. Soft, non-tender, non-distended, with normoactive bowel sounds. No palpable masses, rebound, or guarding]  BACK: [Intact ROM. No midline vertebral tenderness, step off, or crepitus. No CVA tenderness.]  MUSCULOSKELETAL: [Extremities nontender to palpation. No gross deformity or evidence of external trauma. Intact range of motion. Sensation intact. No clubbing, cyanosis, or edema.]  SKIN: [Warm, dry. No jaundice, rash, urticaria, or petechiae]  NEUROLOGIC: [Alert and oriented x 3, GCS 15, normal mentation for age. Moves all four extremities. No gross sensory deficit. Cerebellar function grossly normal.]  PSYCHIATRIC: [Normal mood and affect, thought process is clear and linear]     DIFFERENTIAL DIAGNOSIS:   ?long covid, ?anemia, ?electrolyte disturbance, less likely ACS, and others    DIAGNOSTIC RESULTS     EKG: All EKG's are interpreted by the Emergency Department Physician who either signs or Co-signsthis chart in the absence of a cardiologist.  Normal sinus rhythm at a rate of 69 bpm, NE interval 170 ms, QRS duration 82 ms,  ms, no ST elevation or depression interpretation. RADIOLOGY: non-plain film images(s) such as CT,Ultrasound and MRI are read by the radiologist.    XR CHEST PORTABLE   Final Result   1. No acute disease.       This document has been electronically signed by: Valdo Logan M.D. on    03/10/2022 09:00 PM        [] Visualized and interpreted by me   [x] Radiologist's Wet Read Report Reviewed   [] Discussed withRadiologist.    LABS:   Labs Reviewed   BASIC METABOLIC PANEL W/ REFLEX TO MG FOR LOW K - Abnormal; Notable for the following components:       Result Value    Glucose 130 (*) CREATININE 1.4 (*)     All other components within normal limits   CBC WITH AUTO DIFFERENTIAL - Abnormal; Notable for the following components:    RDW-SD 47.3 (*)     All other components within normal limits   GLOMERULAR FILTRATION RATE, ESTIMATED - Abnormal; Notable for the following components:    Est, Glom Filt Rate 64 (*)     All other components within normal limits   TROPONIN   BRAIN NATRIURETIC PEPTIDE   ANION GAP   OSMOLALITY   TROPONIN       EMERGENCY DEPARTMENT COURSE:   Vitals:    Vitals:    03/10/22 2001 03/10/22 2144 03/10/22 2306   BP: (!) 149/98 (!) 144/85 135/74   Pulse: 75 74 77   Resp: 19 18 18   Temp: 98.8 °F (37.1 °C) 98.5 °F (36.9 °C) 98.3 °F (36.8 °C)   TempSrc: Oral Oral Oral   SpO2: 100% 100% 100%   Weight: 290 lb (131.5 kg)     Height: 5' 8\" (1.727 m)         The results of pertinent diagnostic studies and exam findings were discussed. The patients provisional diagnosis and plan of care were discussed with the patient and present family. The patient and/or present family expressed understanding of the diagnosis and plan. The nurse was instructed to provide written instructions and appropriate follow-up information. The patient understands their need and responsibility to obtain additional follow-up as instructed. The patient is comfortable with the plan and discharge. The risks of medications administered and prescribed were discussed with the patient and family present. Old records reviewed: patient has had negative stress test in past 6 months, heart score 3. Offered close follow up appointment with cardiologist and patient wishes to proceed with outpatient follow up. States he feels better now after having been in ED. CRITICAL CARE:   None    CONSULTS:  None    PROCEDURES:  None    FINAL IMPRESSION      1.  Chest pain, unspecified type          DISPOSITION/PLAN   Discharge    PATIENT REFERRED TO:  SANDRA Tripathi - CNP  [address] [facility] [id number]  [phone]    Schedule an appointment as soon as possible for a visit       [facility]  [address]  [place] [id number]  [phone]    Keep your appointment at 10am tomorrow morning. DISCHARGE MEDICATIONS:  Discharge Medication List as of 3/11/2022 12:06 AM          (Please note that portions of this note were completed with a voice recognition program.  Efforts were made to edit the dictations but occasionally words are mis-transcribed.)    Provider:  I personally performed the services described in the documentation, reviewed and edited the documentation which was dictated, and it accurately records my words and actions.     Chris Odonnell MD 3/10/22 2:23 AM                Chris Odonnell MD  03/11/22 0225

## 2022-03-11 NOTE — ED NOTES
Patient resting in bed. Respirations easy and unlabored. No distress noted. Call light within reach.        Mahesh Quintero RN  03/10/22 1373

## 2022-03-11 NOTE — ED TRIAGE NOTES
Pt arrives to ED with c/o chest pain. Pt states it started a few hours ago and has had no improvement with some rest. Pt states he had covid 2 months ago and ever since has had increased tiredness, chest tightness and headaches.    Pt is Aox4, calm and cooperative on arrival

## 2022-03-11 NOTE — ED NOTES
Patient resting in bed. Respirations easy and unlabored. No distress noted. Call light within reach.   Pt updated on POC, no new needs at this time     Sari Whitney RN  03/10/22 0804

## 2022-03-14 ENCOUNTER — OFFICE VISIT (OUTPATIENT)
Dept: CARDIOLOGY CLINIC | Age: 52
End: 2022-03-14
Payer: MEDICARE

## 2022-03-14 ENCOUNTER — OFFICE VISIT (OUTPATIENT)
Dept: NEUROSURGERY | Age: 52
End: 2022-03-14
Payer: MEDICARE

## 2022-03-14 VITALS
DIASTOLIC BLOOD PRESSURE: 82 MMHG | SYSTOLIC BLOOD PRESSURE: 124 MMHG | BODY MASS INDEX: 43.95 KG/M2 | WEIGHT: 290 LBS | HEIGHT: 68 IN

## 2022-03-14 VITALS
SYSTOLIC BLOOD PRESSURE: 154 MMHG | BODY MASS INDEX: 43.95 KG/M2 | HEART RATE: 64 BPM | WEIGHT: 290 LBS | HEIGHT: 68 IN | DIASTOLIC BLOOD PRESSURE: 78 MMHG

## 2022-03-14 DIAGNOSIS — I10 HYPERTENSION, ESSENTIAL: Chronic | ICD-10-CM

## 2022-03-14 DIAGNOSIS — R20.2 NUMBNESS AND TINGLING OF BOTH LOWER EXTREMITIES: ICD-10-CM

## 2022-03-14 DIAGNOSIS — M47.816 LUMBAR SPONDYLOSIS: Primary | ICD-10-CM

## 2022-03-14 DIAGNOSIS — I50.32 CHRONIC HEART FAILURE WITH PRESERVED EJECTION FRACTION (HCC): Chronic | ICD-10-CM

## 2022-03-14 DIAGNOSIS — Z86.79 HISTORY OF ATRIAL FIBRILLATION: Chronic | ICD-10-CM

## 2022-03-14 DIAGNOSIS — E78.5 DYSLIPIDEMIA: Chronic | ICD-10-CM

## 2022-03-14 DIAGNOSIS — R20.0 NUMBNESS AND TINGLING OF BOTH LOWER EXTREMITIES: ICD-10-CM

## 2022-03-14 DIAGNOSIS — Z01.818 PRE-OP EVALUATION: Primary | ICD-10-CM

## 2022-03-14 DIAGNOSIS — I35.0 AORTIC STENOSIS, MILD: Chronic | ICD-10-CM

## 2022-03-14 DIAGNOSIS — R07.89 CHEST PAIN, ATYPICAL: ICD-10-CM

## 2022-03-14 DIAGNOSIS — M21.372 LEFT FOOT DROP: ICD-10-CM

## 2022-03-14 PROBLEM — R07.9 CHEST PAIN: Status: RESOLVED | Noted: 2021-09-06 | Resolved: 2022-03-14

## 2022-03-14 LAB — GFR SERPL CREATININE-BSD FRML MDRD: 78 ML/MIN/1.73M2

## 2022-03-14 PROCEDURE — G8417 CALC BMI ABV UP PARAM F/U: HCPCS

## 2022-03-14 PROCEDURE — G8482 FLU IMMUNIZE ORDER/ADMIN: HCPCS | Performed by: INTERNAL MEDICINE

## 2022-03-14 PROCEDURE — 3017F COLORECTAL CA SCREEN DOC REV: CPT

## 2022-03-14 PROCEDURE — G8417 CALC BMI ABV UP PARAM F/U: HCPCS | Performed by: INTERNAL MEDICINE

## 2022-03-14 PROCEDURE — G8427 DOCREV CUR MEDS BY ELIG CLIN: HCPCS | Performed by: INTERNAL MEDICINE

## 2022-03-14 PROCEDURE — 99214 OFFICE O/P EST MOD 30 MIN: CPT | Performed by: INTERNAL MEDICINE

## 2022-03-14 PROCEDURE — 3017F COLORECTAL CA SCREEN DOC REV: CPT | Performed by: INTERNAL MEDICINE

## 2022-03-14 PROCEDURE — G8427 DOCREV CUR MEDS BY ELIG CLIN: HCPCS

## 2022-03-14 PROCEDURE — G8482 FLU IMMUNIZE ORDER/ADMIN: HCPCS

## 2022-03-14 PROCEDURE — 99213 OFFICE O/P EST LOW 20 MIN: CPT

## 2022-03-14 PROCEDURE — 1036F TOBACCO NON-USER: CPT | Performed by: INTERNAL MEDICINE

## 2022-03-14 PROCEDURE — 1036F TOBACCO NON-USER: CPT

## 2022-03-14 ASSESSMENT — ENCOUNTER SYMPTOMS
SHORTNESS OF BREATH: 0
COLOR CHANGE: 0
TROUBLE SWALLOWING: 0
BACK PAIN: 1
NAUSEA: 0
CONSTIPATION: 0
COUGH: 0

## 2022-03-14 NOTE — PROGRESS NOTES
Chief Complaint   Patient presents with    Check-Up     fu ed chest pain - needs clearance     Congestive Heart Failure   Originally patient establish cardiologist from [facility]      Pt here for ED fu chest pain -- needs clearance back injections with [facility] 3/30/22    Had chest pressure 3 days back and seen in ER and wrose with deep breath and given asa and resolved next day.  It was constant    No cp for the last 48 hrs    Leg edema +1 -chronic - not worse  Sob on exertion chronic    Denied  Chest pain,  dizziness or palpitations  Non wt gaibn    Hxof  chest wall tenderness    nevere smoked    FHX  Mother had MI at her 52's      Past- 2012 had cp and abn nuc then and did not want cath that time    Patient Active Problem List   Diagnosis    History of atrial fibrillation    BPH (benign prostatic hyperplasia)    Carpal tunnel syndrome on right    Chronic gout    DM2 (diabetes mellitus, type 2) ([address]    Heart failure with preserved ejection fraction ([address]    History of alcohol abuse    History of osteomyelitis    Hypogonadism, male    Major depression    Morbid obesity ([address]    DURAN (nonalcoholic steatohepatitis)    KIARA treated with BiPAP    Vitamin D deficiency    Normocytic anemia    Physical deconditioning    Mood disorder (HCC)    Hallucinations    GERD (gastroesophageal reflux disease)    Wound of buttock    Primary osteoarthritis of left hip    Acute on chronic anemia    Dysphagia    Hypertension, essential    Dyslipidemia    Aortic stenosis, mild to moderate    Perirectal abscess    Lesion of right lobe of liver    Hepatic steatosis    Elevated alkaline phosphatase level    Anticoagulated    Leukocytosis    Hx of Chest pain, atypical    Pre-op evaluation for back injection       Past Surgical History:   Procedure Laterality Date    COLONOSCOPY N/A 11/15/2020    COLONOSCOPY DIAGNOSTIC performed by Merlinda Sers, MD at [facility]    ENDOSCOPY, COLON, DIAGNOSTIC      FOOT on file   Social Connections:     Frequency of Communication with Friends and Family: Not on file    Frequency of Social Gatherings with Friends and Family: Not on file    Attends Confucianist Services: Not on file    Active Member of Clubs or Organizations: Not on file    Attends Club or Organization Meetings: Not on file    Marital Status: Not on file   Intimate Partner Violence:     Fear of Current or Ex-Partner: Not on file    Emotionally Abused: Not on file    Physically Abused: Not on file    Sexually Abused: Not on file   Housing Stability:     Unable to Pay for Housing in the Last Year: Not on file    Number of [place] in the Last Year: Not on file    Unstable Housing in the Last Year: Not on file       Current Outpatient Medications   Medication Sig Dispense Refill    pantoprazole (PROTONIX) 40 MG tablet Take 1 tablet by mouth 2 times daily (before meals) 60 tablet 5    pregabalin (LYRICA) 50 MG capsule Take 50 mg by mouth 3 times daily.  clotrimazole-betamethasone (LOTRISONE) 1-0.05 % cream Apply topically 2 times daily.  1 each 0    potassium chloride (KLOR-CON M) 20 MEQ extended release tablet Take 1 tablet by mouth daily 30 tablet 2    apixaban (ELIQUIS) 5 MG TABS tablet Take 10mg by mouth two times daily for 6 days followed by 5mg by mouth two times daily 60 tablet 1    ferrous sulfate (IRON 325) 325 (65 Fe) MG tablet Take 1 tablet by mouth 2 times daily (with meals) 30 tablet 3    albuterol sulfate HFA (PROVENTIL HFA) 108 (90 Base) MCG/ACT inhaler Inhale 2 puffs into the lungs every 6 hours as needed for Wheezing 1 Inhaler 3    vitamin C (ASCORBIC ACID) 500 MG tablet Take 2 tablets by mouth daily 30 tablet 0    CHOLECALCIFEROL PO Take 2,000 Units by mouth daily      atorvastatin (LIPITOR) 40 MG tablet Take 40 mg by mouth nightly      acetaminophen (TYLENOL) 325 MG tablet Take 650 mg by mouth every 4 hours as needed for Pain      busPIRone (BUSPAR) 10 MG tablet Take 10 mg by mouth 2 times daily       allopurinol (ZYLOPRIM) 300 MG tablet Take 1 tablet by mouth daily 90 tablet 0    Multiple Vitamins-Minerals (THERAPEUTIC MULTIVITAMIN-MINERALS) tablet Take 1 tablet by mouth daily      Probiotic Product (SOBIA-BID PROBIOTIC PO) Take 1 tablet by mouth daily       ondansetron (ZOFRAN) 4 MG tablet Take 4 mg by mouth every 8 hours as needed for Nausea or Vomiting      folic acid (FOLVITE) 1 MG tablet Take 1 mg by mouth daily      furosemide (LASIX) 40 MG tablet Take 40 mg by mouth daily       sitaGLIPtan-metformin (JANUMET)  MG per tablet Take 1 tablet by mouth 2 times daily (with meals)       senna (SENOKOT) 8.6 MG tablet Take 1 tablet by mouth 2 times daily      ARIPiprazole (ABILIFY PO) Take 15 mg by mouth daily       Citalopram Hydrobromide (CELEXA PO) Take 30 mg by mouth daily From [facility] [employer] services       Blood Glucose Monitoring Suppl (FREESTYLE LITE) ARTIE Patient needs all supplies for qd testing. DX: 250.00 1 Device 11     No current facility-administered medications for this visit. Review of Systems -     General ROS: negative  Psychological ROS: negative  Hematological and Lymphatic ROS: No history of blood clots or bleeding disorder. Respiratory ROS: no cough,  or wheezing, the rest see HPI  Cardiovascular ROS: See HPI  Gastrointestinal ROS: negative  Genito-Urinary ROS: no dysuria, trouble voiding, or hematuria  Musculoskeletal ROS: negative  Neurological ROS: no TIA or stroke symptoms  Dermatological ROS: negative      Blood pressure (!) 154/78, pulse 64, height 5' 8\" (1.727 m), weight 290 lb (131.5 kg).         Physical Examination:    General appearance - alert, well appearing, and in no distress  HEENT- Pink conjunctiva  , Non-icteri sclera,PERRLA  Mental status - alert, oriented to person, place, and time  Neck - supple, no significant adenopathy, no JVD, or carotid bruits  Chest - clear to auscultation, no wheezes, rales or rhonchi, symmetric air entry  Heart - normal rate, regular rhythm, normal S1, S2, no murmurs, rubs, clicks or gallops  Abdomen - soft, nontender, nondistended, no masses or organomegaly  JORDAN- no CVA or flank tenderness, no suprapubic tenderness  Neurological - alert, oriented, normal speech, no focal findings or movement disorder noted  Musculoskeletal/limbs - no joint tenderness, deformity or swelling   - peripheral pulses normal, no pedal edema, no clubbing or cyanosis  Skin - normal coloration and turgor, no rashes, no suspicious skin lesions noted  Psych- appropriate mood and affect    Lab  No results for input(s): CKTOTAL, CKMB, CKMBINDEX, TROPONINI in the last 72 hours.   CBC:   Lab Results   Component Value Date    WBC 9.6 03/10/2022    RBC 5.07 03/10/2022    RBC 4.55 04/15/2012    HGB 15.4 03/10/2022    HCT 46.0 03/10/2022    MCV 90.7 03/10/2022    MCH 30.4 03/10/2022    MCHC 33.5 03/10/2022    RDW 19.9 04/06/2020     03/10/2022    MPV 11.5 03/10/2022     BMP:    Lab Results   Component Value Date     03/10/2022    K 4.2 03/10/2022     03/10/2022    CO2 25 03/10/2022    BUN 22 03/10/2022    LABALBU 4.0 03/04/2022    LABALBU 4.4 01/26/2012    CREATININE 1.4 03/10/2022    CALCIUM 9.5 03/10/2022    GFRAA >60 01/23/2019    LABGLOM 53 03/10/2022    GLUCOSE 130 03/10/2022    GLUCOSE 113 06/24/2018     Hepatic Function Panel:    Lab Results   Component Value Date    ALKPHOS 159 03/04/2022    ALT 46 03/04/2022    AST 67 03/04/2022    PROT 7.2 03/04/2022    BILITOT 0.6 03/04/2022    BILIDIR <0.2 03/04/2022    LABALBU 4.0 03/04/2022    LABALBU 4.4 01/26/2012     Magnesium:    Lab Results   Component Value Date    MG 1.4 11/19/2020     Warfarin PT/INR:  No components found for: PTPATWAR, PTINRWAR  HgBA1c:    Lab Results   Component Value Date    LABA1C 7.3 09/06/2021     FLP:  No results found for: TRIG, HDL, LDLCALC, LDLDIRECT, LABVLDL  TSH:  No results found for: TSH  Conclusions      Summary   Ejection fraction is visually estimated at 60%. Overall left ventricular function is normal.   The aortic valve leaflets were not well visualized. Aortic valve appears tricuspid. Aortic valve leaflets are somewhat thickened. Aortic valve leaflets are Mildly calcified. The maximum aortic valve gradient is 30 mmHg, the mean gradient is 15   mmHg, and the peak velocity is 275 cm/s. There is mild-to-moderate aortic stenosis with valve area of 1.5 sq cm. Signature      ----------------------------------------------------------------   Electronically signed by María Henderson MD (Interpreting   physician) on 09/24/2020 at 04:36 PM   ----------------------------------------------------------------           Conclusions      Summary   Ejection fraction is visually estimated at 55%. Overall left ventricular function is normal.      Signature      ----------------------------------------------------------------   Electronically signed by María Henderson MD (Interpreting   physician) on 09/07/2021 at 03:43 PM   ----------------------------------------------------------------    Conclusions      Summary   This Nuclear Medicine study was negative for ischemia . difficult scan due to patient body habitus   normal EF      Recommendation   Medical management. Signatures      ----------------------------------------------------------------   Electronically signed by María Henderson MD (Interpreting   Cardiologist) on 09/07/2021 at 16:38   ----------------------------------------------------------------           EKG 9/30/19  Sinus  Rhythm   Low voltage in precordial leads.    -  Nonspecific T-abnormality.      ABNORMAL   Normal sinus rhythm  Low voltage QRS, consider pulmonary disease, pericardial effusion, or normal variant  Nonspecific T wave abnormality  Abnormal ECG  When compared with ECG of 09-NOV-2011 22:01,  No significant change was found  Confirmed by Janna Eugene MD, Naif Perez ([id number]) on 5/20/2020 8:02:47    topronin neg x2    ekg 3/10/22  Normal sinus rhythm Low voltage QRS, consider pulmonary disease, pericardial effusion, or normal variant Borderline ECG When compared with ECG of 10-MAR-2022 19:59, No significant change was found    Assessment       Diagnosis Orders   1. Pre-op evaluation for back injection     2. Chronic heart failure with preserved ejection fraction ([address]     3. Hypertension, essential     4. Dyslipidemia     5. Aortic stenosis, mild to moderate     6. Hx of Chest pain, atypical     7. History of atrial fibrillation           Plan     The current meds and labs reviewed    Recent admission record in sept 2021  And ED visit from 3/10/22 reviewed    Pre op eval for back injection  May proceed with low to mod risk  May hold apixaban for 3 days    Continue the current treatment and with constant vigilance to changes in symptoms and also any potential side effects. Return for care or seek medical attention immediately if symptoms got worse and/or develop new symptoms. Hypertension, on medical treatment. Seems to be under poor control. Patient is compliant with medical treatment. Increase hydralazine 50 tid from 50 bid     Echo and nuc stress- WNL sept 2021  Asa 81 po qd  Limited walking capability  No cp free  Advised to get to ER if any recurrence of cp    Congestive heart failure: no evidence of fluid overload today, no recent hospitalization for CHF  Leg edema +1 stable  On lasix 40 po qd  Cont  kcl 20 meq po qd    Advised to lose wt     Hyperlipidemia: on statins, followed periodically. Patient need periodic lipid and liver profile.       Hx of atr fib - no ekg found But documented on note from outside  Cont apixaban    Document reviewed from outside  Reported CHF and atr fib and meds   Lasix and apixaban for it  Need to get ekg with atr fib    D/w the pat plan of care    Pat from From cardiac stand Stable and doing well    Discussed use, benefit, and side effects of prescribed medications. All patient questions answered. Pt voiced understanding. Instructed to continue current medications, diet and exercise. Continue risk factor modification and medical management. Patient agreed with treatment plan. Follow up as directed.       RTC in 2  months        Courtney Duran, [facility]

## 2022-03-29 ENCOUNTER — OFFICE VISIT (OUTPATIENT)
Dept: RHEUMATOLOGY | Age: 52
End: 2022-03-29
Payer: MEDICARE

## 2022-03-29 VITALS
WEIGHT: 304.2 LBS | HEIGHT: 68 IN | HEART RATE: 75 BPM | BODY MASS INDEX: 46.1 KG/M2 | SYSTOLIC BLOOD PRESSURE: 136 MMHG | OXYGEN SATURATION: 95 % | DIASTOLIC BLOOD PRESSURE: 82 MMHG

## 2022-03-29 DIAGNOSIS — M54.42 CHRONIC MIDLINE LOW BACK PAIN WITH BILATERAL SCIATICA: ICD-10-CM

## 2022-03-29 DIAGNOSIS — G89.29 CHRONIC MIDLINE LOW BACK PAIN WITH BILATERAL SCIATICA: ICD-10-CM

## 2022-03-29 DIAGNOSIS — M1A.9XX1 CHRONIC TOPHACEOUS GOUT: ICD-10-CM

## 2022-03-29 DIAGNOSIS — Z51.81 MEDICATION MONITORING ENCOUNTER: ICD-10-CM

## 2022-03-29 DIAGNOSIS — M45.0 ANKYLOSING SPONDYLITIS OF MULTIPLE SITES IN SPINE (HCC): Primary | ICD-10-CM

## 2022-03-29 DIAGNOSIS — M54.41 CHRONIC MIDLINE LOW BACK PAIN WITH BILATERAL SCIATICA: ICD-10-CM

## 2022-03-29 DIAGNOSIS — M15.9 PRIMARY OSTEOARTHRITIS INVOLVING MULTIPLE JOINTS: ICD-10-CM

## 2022-03-29 PROCEDURE — G8417 CALC BMI ABV UP PARAM F/U: HCPCS | Performed by: INTERNAL MEDICINE

## 2022-03-29 PROCEDURE — G8427 DOCREV CUR MEDS BY ELIG CLIN: HCPCS | Performed by: INTERNAL MEDICINE

## 2022-03-29 PROCEDURE — 1036F TOBACCO NON-USER: CPT | Performed by: INTERNAL MEDICINE

## 2022-03-29 PROCEDURE — 3017F COLORECTAL CA SCREEN DOC REV: CPT | Performed by: INTERNAL MEDICINE

## 2022-03-29 PROCEDURE — 99215 OFFICE O/P EST HI 40 MIN: CPT | Performed by: INTERNAL MEDICINE

## 2022-03-29 PROCEDURE — G8482 FLU IMMUNIZE ORDER/ADMIN: HCPCS | Performed by: INTERNAL MEDICINE

## 2022-03-29 RX ORDER — PREGABALIN 150 MG/1
CAPSULE ORAL
COMMUNITY
Start: 2022-03-01 | End: 2022-08-25 | Stop reason: SDUPTHER

## 2022-03-29 RX ORDER — GLIPIZIDE 5 MG/1
TABLET, EXTENDED RELEASE ORAL
COMMUNITY
Start: 2022-02-14 | End: 2022-09-06

## 2022-03-29 RX ORDER — POTASSIUM CHLORIDE 750 MG/1
TABLET, FILM COATED, EXTENDED RELEASE ORAL
COMMUNITY
Start: 2022-02-14 | End: 2022-09-06

## 2022-03-29 RX ORDER — HYDROCODONE BITARTRATE AND ACETAMINOPHEN 5; 325 MG/1; MG/1
TABLET ORAL
COMMUNITY
Start: 2022-03-25 | End: 2022-07-05 | Stop reason: SDUPTHER

## 2022-03-29 RX ORDER — SECUKINUMAB 150 MG/ML
INJECTION SUBCUTANEOUS
Qty: 5 ML | Refills: 2 | Status: SHIPPED | OUTPATIENT
Start: 2022-03-29 | End: 2022-09-12

## 2022-03-29 RX ORDER — ALLOPURINOL 100 MG/1
TABLET ORAL
COMMUNITY
Start: 2022-02-14 | End: 2022-05-26

## 2022-03-29 RX ORDER — ACETAMINOPHEN 160 MG
TABLET,DISINTEGRATING ORAL
COMMUNITY
Start: 2022-01-13 | End: 2022-09-06

## 2022-03-29 RX ORDER — LOSARTAN POTASSIUM AND HYDROCHLOROTHIAZIDE 12.5; 5 MG/1; MG/1
TABLET ORAL
COMMUNITY
Start: 2022-01-13 | End: 2022-09-06

## 2022-03-29 RX ORDER — ACETAMINOPHEN AND CODEINE PHOSPHATE 300; 30 MG/1; MG/1
1 TABLET ORAL EVERY 6 HOURS PRN
Qty: 28 TABLET | Refills: 0 | Status: SHIPPED | OUTPATIENT
Start: 2022-03-29 | End: 2022-04-05

## 2022-03-29 ASSESSMENT — ENCOUNTER SYMPTOMS
EYE ITCHING: 0
EYE PAIN: 0
TROUBLE SWALLOWING: 0
DIARRHEA: 0
BACK PAIN: 1
ABDOMINAL PAIN: 0
COUGH: 0
NAUSEA: 0
SHORTNESS OF BREATH: 0
CONSTIPATION: 0

## 2022-03-29 NOTE — PATIENT INSTRUCTIONS
NEW office address:   [address] [place] [address]. [address] [facility] [address]  [place]

## 2022-03-29 NOTE — PROGRESS NOTES
[facility] RHEUMATOLOGY FOLLOW UP NOTE       Date Of Service: 3/29/2022  Provider: Robbi Avendano DO    Name: Lizzie Ospina   MRN: [id number]    Chief Complaint(s)     Chief Complaint   Patient presents with    Follow-up     4 mo f/u        History of Present Illness (HPI)     Lizzie Ospina  is a(n)51 y.o. male with a hx of abnormal liver function, alcohol abuse, anemia, arthropathy, carpal tunnel syndrome (right), depression, fatigue, foot pain, HTN, hyperuricemia, DURAN, obesity, KIARA, Z4RV, diastolic heart failure, DDD here for the f/u evaluation of tophaceous gout. Gout - no flares. Reported complaince w/ allopurinol     Worsening pain in the Bilatearl feet (aching/throbbing)  Past couple months arthraliga of the bilateral hands, left wrist, left hip, left knee, bilat ankle, bilat feet (left > right), neck and lower back. Pain up to 8/10 over the past week. greatest in the mornings. Swelling of the left lower legs , tingling sole of the left foot. Aggravating factors: weather changes Alleviating factors: percocet, hot baths. All day stiffness. Continued finger nail changes. REVIEW OF SYSTEMS: (ROS)    Review of Systems   Constitutional: Negative for fatigue, fever and unexpected weight change. HENT: Negative for congestion and trouble swallowing. Dry  mouth   Eyes: Negative for pain and itching. Respiratory: Negative for cough and shortness of breath. Cardiovascular: Negative for chest pain and leg swelling. Gastrointestinal: Negative for abdominal pain, constipation, diarrhea and nausea. Endocrine: Negative for cold intolerance and heat intolerance. Genitourinary: Negative for difficulty urinating, frequency and urgency. Musculoskeletal: Positive for arthralgias, back pain, myalgias and neck pain. Negative for joint swelling. Skin: Positive for rash (scales on face). Neurological: Positive for weakness (left leg) and numbness (left leg).  Negative for SOCIAL HISTORY      Social History     Tobacco History     Smoking Status  Never Smoker    Smokeless Tobacco Use  Never Used          Alcohol History     Alcohol Use Status  No Comment  hx of abuse          Drug Use     Drug Use Status  No          Sexual Activity     Sexually Active  Not Asked                ALLERGIES     Allergies   Allergen Reactions    Penicillins      Tolerated Zosyn 9/24/2020       CURRENT MEDICATIONS      Current Outpatient Medications   Medication Sig Dispense Refill    allopurinol (ZYLOPRIM) 100 MG tablet TAKE ONE TABLET BY MOUTH ONCE DAILY      Cholecalciferol (VITAMIN D3) 50 MCG (2000 UT) CAPS TAKE ONE CAPSULE BY MOUTH ONCE DAILY      HYDROcodone-acetaminophen (NORCO) 5-325 MG per tablet TAKE 1 TABLET BY MOUTH EVERY 4-TO-6 HOURS AS NEEDED      GLIPIZIDE XL 5 MG extended release tablet TAKE ONE TABLET BY MOUTH ONCE DAILY      losartan-hydroCHLOROthiazide (HYZAAR) 50-12.5 MG per tablet TAKE ONE TABLET BY MOUTH ONCE DAILY      potassium chloride (KLOR-CON) 10 MEQ extended release tablet TAKE ONE TABLET BY MOUTH ONCE DAILY      pregabalin (LYRICA) 150 MG capsule TAKE ONE CAPSULE BY MOUTH THREE TIMES DAILY      secukinumab (COSENTYX) 150 MG/ML SOSY Inject 150mg subcu weekly for 5 weeks then 4 weeks later start 150mg subcu every 4 weeks. 5 mL 2    acetaminophen-codeine (TYLENOL/CODEINE #3) 300-30 MG per tablet Take 1 tablet by mouth every 6 hours as needed for Pain for up to 7 days. Intended supply: 3 days. Take lowest dose possible to manage pain 28 tablet 0    pantoprazole (PROTONIX) 40 MG tablet Take 1 tablet by mouth 2 times daily (before meals) 60 tablet 5    pregabalin (LYRICA) 50 MG capsule Take 50 mg by mouth 3 times daily.  clotrimazole-betamethasone (LOTRISONE) 1-0.05 % cream Apply topically 2 times daily.  1 each 0    potassium chloride (KLOR-CON M) 20 MEQ extended release tablet Take 1 tablet by mouth daily 30 tablet 2    apixaban (ELIQUIS) 5 MG TABS tablet Take 10mg by mouth two times daily for 6 days followed by 5mg by mouth two times daily 60 tablet 1    ferrous sulfate (IRON 325) 325 (65 Fe) MG tablet Take 1 tablet by mouth 2 times daily (with meals) 30 tablet 3    albuterol sulfate HFA (PROVENTIL HFA) 108 (90 Base) MCG/ACT inhaler Inhale 2 puffs into the lungs every 6 hours as needed for Wheezing 1 Inhaler 3    vitamin C (ASCORBIC ACID) 500 MG tablet Take 2 tablets by mouth daily 30 tablet 0    CHOLECALCIFEROL PO Take 2,000 Units by mouth daily      atorvastatin (LIPITOR) 40 MG tablet Take 40 mg by mouth nightly      acetaminophen (TYLENOL) 325 MG tablet Take 650 mg by mouth every 4 hours as needed for Pain      busPIRone (BUSPAR) 10 MG tablet Take 10 mg by mouth 2 times daily       allopurinol (ZYLOPRIM) 300 MG tablet Take 1 tablet by mouth daily 90 tablet 0    Multiple Vitamins-Minerals (THERAPEUTIC MULTIVITAMIN-MINERALS) tablet Take 1 tablet by mouth daily      Probiotic Product (SOBIA-BID PROBIOTIC PO) Take 1 tablet by mouth daily       ondansetron (ZOFRAN) 4 MG tablet Take 4 mg by mouth every 8 hours as needed for Nausea or Vomiting      folic acid (FOLVITE) 1 MG tablet Take 1 mg by mouth daily      furosemide (LASIX) 40 MG tablet Take 40 mg by mouth daily       sitaGLIPtan-metformin (JANUMET)  MG per tablet Take 1 tablet by mouth 2 times daily (with meals)       senna (SENOKOT) 8.6 MG tablet Take 1 tablet by mouth 2 times daily      ARIPiprazole (ABILIFY PO) Take 15 mg by mouth daily       Citalopram Hydrobromide (CELEXA PO) Take 30 mg by mouth daily From [employer] [facility] services       Blood Glucose Monitoring Suppl (FREESTYLE LITE) ARTIE Patient needs all supplies for qd testing. DX: 250.00 1 Device 11     No current facility-administered medications for this visit.        PHYSICAL EXAMINATION / OBJECTIVE   Objective:  /82 (Site: Left Upper Arm, Position: Sitting, Cuff Size: Large Adult)   Pulse 75   Ht 5' 7.99\" (1.727 m) Wt (!) 304 lb 3.2 oz (138 kg)   SpO2 95%   BMI 46.26 kg/m²     Physical Exam  Vitals reviewed. Constitutional:       Appearance: He is well-developed. Cardiovascular:      Rate and Rhythm: Normal rate and regular rhythm. Pulmonary:      Effort: Pulmonary effort is normal.      Breath sounds: Normal breath sounds. Abdominal:      Palpations: Abdomen is soft. Tenderness: There is no abdominal tenderness. Musculoskeletal:      Cervical back: Normal range of motion and neck supple. Skin:     General: Skin is warm and dry. Findings: No rash. Comments: Scars -- left posterior chest wall  Nail thickening bilateral toenails  Onycholysis Rt fingernail  Transverse nail grooves bilat.    mild skin thickening behind the bilateral ears   Neurological:      Mental Status: He is alert and oriented to person, place, and time. Deep Tendon Reflexes: Reflexes are normal and symmetric. Psychiatric:         Thought Content: Thought content normal.       Upper extremities:   Shoulders:  + tender bilaterally  Elbows:      Non-tender,   Wrists        Non-tender,   Hands:     Tender bilat PIPs. MCPs     Lower extremities:  Hips:      + tender left  Knees :   + tender left  Ankles: + tender and swelling bilat  Feet:       + MTP compression left, tenderness left midfoot.  Circumfirential swelling of the left 2nd toe. + calceneal heal spuring right         RAPID 3:   3/29/2022 --- RAPID 3: 5.3 + 8.5 + 7.0 = 20.8     Remission: <3  Low Disease Activity: <6  Moderate Disease Activity: >=6 and <=12  High Disease Activity: >12     LABS    CBC  Lab Results   Component Value Date    WBC 9.6 03/10/2022    RBC 5.07 03/10/2022    RBC 4.55 04/15/2012    HGB 15.4 03/10/2022    HCT 46.0 03/10/2022    MCV 90.7 03/10/2022    MCH 30.4 03/10/2022    MCHC 33.5 03/10/2022    RDW 19.9 04/06/2020     03/10/2022       CMP  Lab Results   Component Value Date    CALCIUM 9.5 03/10/2022    LABALBU 4.0 03/04/2022    LABALBU 4.4 01/26/2012    PROT 7.2 03/04/2022     03/10/2022    K 4.2 03/10/2022    CO2 25 03/10/2022     03/10/2022    BUN 22 03/10/2022    CREATININE 1.4 03/10/2022    ALKPHOS 159 03/04/2022    ALT 46 03/04/2022    AST 67 03/04/2022     Lab Results   Component Value Date    VITD25 26 10/17/2020     Lab Results   Component Value Date    ANASCRN None Detected 10/06/2020       Lab Results   Component Value Date    DSDNAAB SEE BELOW 04/27/2016      Lab Results   Component Value Date    RF <10 01/23/2019     Lab Results   Component Value Date    SEDRATE 9 04/06/2020     Lab Results   Component Value Date    CRP 2.35 (H) 12/05/2021       RADIOLOGY:         ASSESSMENT/PLAN    Assessment   Plan       Undiff axial spondyloarthritis  Chronic low back pain  - prior hx of dermatomyositis and rheumatoid arthritis. Serologies inconsistent with RA, normal CK/aldolase despite d/c of therapy. Patient with active inflammatory arthritis, + enthesopathy, + CT/abd pelvis consistent with sacroiliitis and SI joints fusions bilat, and colonic thickening (neg C scope eval 11/2020)  Prior treatment - enbrel ( shingles and staph infection)    - plan to start anti-TNF vs IL17 if TB gold negative   -- declined anti-TNF given prior infections with enbrel. -- will puruse cosentyx 150mg subcu every 4 weeks and titrate as needed. -- .tylenol # 3 po q6 hour x 7 days provided. Left foot drop  DVT- on coumdain  - ? post COVID vs other- ? Sensory neuropathy vs radiculopathy given weakness, decreased proprioception and pain sensation in left foot. - EMG/NCV ordered- was not able to have completed due to swelling    Rash- face, back. - + nail changes.  ? Tinea vs psoriasis vs eczema vs other.    - referral to dermatology- did not go    Polyarticular tophaceous gout  - prior left elbow aspiration crystal studies + for monosodium urate   - continue allopurinol 500 mg daily- titrate to goal uric acid < 5 mg/dl   - need uric acid level rechecked    Osteoarthritis of multiple joints   - Percocet PRN    Medication monitoring   - CBC, CMP, sed rate, CRP q 8 weeks   - TB gold previously ordered      Return in about 2 months (around 5/29/2022). Electronically signed by Ofelia Garcia DO on 3/29/2022 at 4:22 PM    New Prescriptions    ACETAMINOPHEN-CODEINE (TYLENOL/CODEINE #3) 300-30 MG PER TABLET    Take 1 tablet by mouth every 6 hours as needed for Pain for up to 7 days. Intended supply: 3 days. Take lowest dose possible to manage pain    SECUKINUMAB (COSENTYX) 150 MG/ML SOSY    Inject 150mg subcu weekly for 5 weeks then 4 weeks later start 150mg subcu every 4 weeks. Thank you for allowing me to participate in the care of this patient. Please call if there are any questions.     [phone]

## 2022-03-30 ENCOUNTER — TELEPHONE (OUTPATIENT)
Dept: PHYSICAL MEDICINE AND REHAB | Age: 52
End: 2022-03-30

## 2022-03-30 ENCOUNTER — TELEPHONE (OUTPATIENT)
Dept: CARDIOLOGY CLINIC | Age: 52
End: 2022-03-30

## 2022-03-30 NOTE — TELEPHONE ENCOUNTER
Med. Clearance received. Noted pt. went to Dr. Pankaj Barrett for his own pre-op clearance on 3/14/2022. That clearance is scanned into media.

## 2022-04-05 ENCOUNTER — TELEPHONE (OUTPATIENT)
Dept: PHYSICAL MEDICINE AND REHAB | Age: 52
End: 2022-04-05

## 2022-04-05 NOTE — TELEPHONE ENCOUNTER
Pt. Contacted. Procedure and follow up rescheduled due to provider out of office. Educated on pre-procedure instructions.  Verbalized understanding

## 2022-04-07 ENCOUNTER — PREP FOR PROCEDURE (OUTPATIENT)
Dept: PHYSICAL MEDICINE AND REHAB | Age: 52
End: 2022-04-07

## 2022-04-07 LAB — GFR SERPL CREATININE-BSD FRML MDRD: 64 ML/MIN/1.73M2

## 2022-04-11 ENCOUNTER — APPOINTMENT (OUTPATIENT)
Dept: GENERAL RADIOLOGY | Age: 52
End: 2022-04-11
Attending: PAIN MEDICINE
Payer: MEDICARE

## 2022-04-11 ENCOUNTER — HOSPITAL ENCOUNTER (OUTPATIENT)
Age: 52
Setting detail: OUTPATIENT SURGERY
Discharge: HOME | End: 2022-04-11
Attending: PAIN MEDICINE | Admitting: PAIN MEDICINE
Payer: MEDICARE

## 2022-04-11 ENCOUNTER — ANESTHESIA (OUTPATIENT)
Dept: OPERATING ROOM | Age: 52
End: 2022-04-11
Payer: MEDICARE

## 2022-04-11 ENCOUNTER — ANESTHESIA EVENT (OUTPATIENT)
Dept: OPERATING ROOM | Age: 52
End: 2022-04-11
Payer: MEDICARE

## 2022-04-11 VITALS
BODY MASS INDEX: 45.34 KG/M2 | SYSTOLIC BLOOD PRESSURE: 163 MMHG | TEMPERATURE: 97.5 F | RESPIRATION RATE: 16 BRPM | HEIGHT: 68 IN | WEIGHT: 299.2 LBS | OXYGEN SATURATION: 94 % | HEART RATE: 73 BPM | DIASTOLIC BLOOD PRESSURE: 88 MMHG

## 2022-04-11 VITALS
SYSTOLIC BLOOD PRESSURE: 132 MMHG | DIASTOLIC BLOOD PRESSURE: 71 MMHG | OXYGEN SATURATION: 98 % | RESPIRATION RATE: 9 BRPM

## 2022-04-11 LAB — GLUCOSE BLD-MCNC: 208 MG/DL (ref 70–108)

## 2022-04-11 PROCEDURE — 64493 INJ PARAVERT F JNT L/S 1 LEV: CPT | Performed by: PAIN MEDICINE

## 2022-04-11 PROCEDURE — 7100000010 HC PHASE II RECOVERY - FIRST 15 MIN: Performed by: PAIN MEDICINE

## 2022-04-11 PROCEDURE — 3209999900 FLUORO FOR SURGICAL PROCEDURES

## 2022-04-11 PROCEDURE — 3700000000 HC ANESTHESIA ATTENDED CARE: Performed by: PAIN MEDICINE

## 2022-04-11 PROCEDURE — 2500000003 HC RX 250 WO HCPCS: Performed by: PAIN MEDICINE

## 2022-04-11 PROCEDURE — 3600000055 HC PAIN LEVEL 3 ADDL 15 MIN: Performed by: PAIN MEDICINE

## 2022-04-11 PROCEDURE — 64494 INJ PARAVERT F JNT L/S 2 LEV: CPT | Performed by: PAIN MEDICINE

## 2022-04-11 PROCEDURE — 64495 INJ PARAVERT F JNT L/S 3 LEV: CPT | Performed by: PAIN MEDICINE

## 2022-04-11 PROCEDURE — 6360000002 HC RX W HCPCS: Performed by: PAIN MEDICINE

## 2022-04-11 PROCEDURE — 7100000011 HC PHASE II RECOVERY - ADDTL 15 MIN: Performed by: PAIN MEDICINE

## 2022-04-11 PROCEDURE — 82948 REAGENT STRIP/BLOOD GLUCOSE: CPT

## 2022-04-11 PROCEDURE — 2709999900 HC NON-CHARGEABLE SUPPLY: Performed by: PAIN MEDICINE

## 2022-04-11 PROCEDURE — 3700000001 HC ADD 15 MINUTES (ANESTHESIA): Performed by: PAIN MEDICINE

## 2022-04-11 PROCEDURE — 3600000054 HC PAIN LEVEL 3 BASE: Performed by: PAIN MEDICINE

## 2022-04-11 PROCEDURE — 6360000002 HC RX W HCPCS

## 2022-04-11 RX ORDER — PROPOFOL 10 MG/ML
INJECTION, EMULSION INTRAVENOUS PRN
Status: DISCONTINUED | OUTPATIENT
Start: 2022-04-11 | End: 2022-04-11 | Stop reason: SDUPTHER

## 2022-04-11 RX ORDER — LIDOCAINE HYDROCHLORIDE 10 MG/ML
INJECTION, SOLUTION INFILTRATION; PERINEURAL PRN
Status: DISCONTINUED | OUTPATIENT
Start: 2022-04-11 | End: 2022-04-11 | Stop reason: ALTCHOICE

## 2022-04-11 RX ORDER — BUPIVACAINE HYDROCHLORIDE 2.5 MG/ML
INJECTION, SOLUTION EPIDURAL; INFILTRATION; INTRACAUDAL PRN
Status: DISCONTINUED | OUTPATIENT
Start: 2022-04-11 | End: 2022-04-11 | Stop reason: ALTCHOICE

## 2022-04-11 RX ORDER — METHYLPREDNISOLONE ACETATE 80 MG/ML
INJECTION, SUSPENSION INTRA-ARTICULAR; INTRALESIONAL; INTRAMUSCULAR; SOFT TISSUE PRN
Status: DISCONTINUED | OUTPATIENT
Start: 2022-04-11 | End: 2022-04-11 | Stop reason: ALTCHOICE

## 2022-04-11 RX ADMIN — PROPOFOL 50 MG: 10 INJECTION, EMULSION INTRAVENOUS at 08:14

## 2022-04-11 RX ADMIN — PROPOFOL 20 MG: 10 INJECTION, EMULSION INTRAVENOUS at 08:20

## 2022-04-11 RX ADMIN — PROPOFOL 50 MG: 10 INJECTION, EMULSION INTRAVENOUS at 08:18

## 2022-04-11 ASSESSMENT — PAIN SCALES - WONG BAKER: WONGBAKER_NUMERICALRESPONSE: 0

## 2022-04-11 ASSESSMENT — PULMONARY FUNCTION TESTS
PIF_VALUE: 0

## 2022-04-11 ASSESSMENT — PAIN - FUNCTIONAL ASSESSMENT
PAIN_FUNCTIONAL_ASSESSMENT: 0-10
PAIN_FUNCTIONAL_ASSESSMENT: PREVENTS OR INTERFERES SOME ACTIVE ACTIVITIES AND ADLS

## 2022-04-11 NOTE — OP NOTE
Pre-Procedure Note    Patient Name: Leonie Montiel   YOB: 1970  Room/Bed: 105 [facility] Pool/NONE  Medical Record Number: [id number]  Date: 4/11/22      Indication:  Lower back pain  Consent: On file. Vital Signs:   Vitals:    04/11/22 0757   BP: (!) 148/96   Pulse: 71   Resp: 20   Temp: 97.1 °F (36.2 °C)   SpO2: 93%       Pre-Sedation Documentation and Exam:   Vital signs have been reviewed (see flow sheet for vitals). Sedation/ Anesthesia Plan:   MAC    Patient is an appropriate candidate for plan of sedation: yes    Preoperative Diagnosis:   L-spondylosis, L-facet arthropathy    Postoperative Diagnosis:   As above    Procedure Performed:  Diagnostic/Confirmatory median branch blocks at the levels of L3-4,L4-5 and L5-S1 bilateral under fluoroscopic guidance for L4/5 and L5-S1      Indication for the Procedure: The patient has a history of chronic low back pain that is not responding well to the conservative treatment. The patient's pain is mostly axial in nature. Pain is interfering with the activities of daily living. Physical examination revealed facet tenderness and facet loading is positive. The procedure and risks  were discussed with the patient and an informed consent was obtained. Procedure:     Patient is placed in prone position and skin over  the back was prepped and draped in sterile manner. Then using fluoroscopy the junction of the transverse process of the vertebra with the superior process of the facet joint was observed and the view was optimized. The skin and deep tissues posterior were infiltrated with 20 ml of 1% lidocaine. Then a #22-gauge  5-1/2 inch spinal needle was introduced through the skin wheal under fluoroscopy guidance such that the tip of the needle lies at the junction of the transverse process with the superior processes of the facet joint.   Then after negative aspiration a total of 12 ml of 0.25% Marcaine and depomedrol (total 80 mg) was injected through the needles. For L5 Median branch block the junction of the ala of  the sacrum with the superior articular process of the facet joint was taken as a reference point and L4 median branch the junction of the transverse process the L5 with the superolateral possible facet joint was taken to the point and healthy median branch the junction of the transverse process of L4 with the superior lateral process of the facet joint was taken as a reference point. EBL-0  Patient's vital signs and neurological status remained stable through  the procedure and post procedural  Period. Patient was instructed to keep track of pain for next 24 hours. every hour and bring it with next visit. Patient tollerated the procedure well and was discharged home in stable condition.     Electronically signed by Ovi Rock MD on 4/11/22 at 8:14 AM EDT

## 2022-04-11 NOTE — ANESTHESIA PRE PROCEDURE
Department of Anesthesiology  Preprocedure Note       Name:  Jeanette Duran   Age:  46 y.o.  :  1970                                          MRN:  [id number]         Date:  2022      Surgeon: Dion Mendoza):  Sarah Samuel MD    Procedure: Procedure(s):  bilateral L-facet MBB # 1 @ L4-5 and L5-S1    Medications prior to admission:   Prior to Admission medications    Medication Sig Start Date End Date Taking? Authorizing Provider   allopurinol (ZYLOPRIM) 100 MG tablet TAKE ONE TABLET BY MOUTH ONCE DAILY 22   Historical Provider, MD   Cholecalciferol (VITAMIN D3) 50 MCG ( UT) CAPS TAKE ONE CAPSULE BY MOUTH ONCE DAILY 22   Historical Provider, MD   HYDROcodone-acetaminophen ([facility]) 5-325 MG per tablet TAKE 1 TABLET BY MOUTH EVERY 4-TO-6 HOURS AS NEEDED 3/25/22   Historical Provider, MD   GLIPIZIDE XL 5 MG extended release tablet TAKE ONE TABLET BY MOUTH ONCE DAILY 22   Historical Provider, MD   losartan-hydroCHLOROthiazide (HYZAAR) 50-12.5 MG per tablet TAKE ONE TABLET BY MOUTH ONCE DAILY 22   Historical Provider, MD   potassium chloride (KLOR-CON) 10 MEQ extended release tablet TAKE ONE TABLET BY MOUTH ONCE DAILY 22   Historical Provider, MD   pregabalin (LYRICA) 150 MG capsule TAKE ONE CAPSULE BY MOUTH THREE TIMES DAILY 3/1/22   Historical Provider, MD   secukinumab (COSENTYX) 150 MG/ML SOSY Inject 150mg subcu weekly for 5 weeks then 4 weeks later start 150mg subcu every 4 weeks. 3/29/22   Luz Maria March, DO   pantoprazole (PROTONIX) 40 MG tablet Take 1 tablet by mouth 2 times daily (before meals) 21   Rachel Good, APRN - CNP   pregabalin (LYRICA) 50 MG capsule Take 50 mg by mouth 3 times daily. Historical Provider, MD   clotrimazole-betamethasone (LOTRISONE) 1-0.05 % cream Apply topically 2 times daily.  21   Yeison Almendarez MD   potassium chloride (KLOR-CON M) 20 MEQ extended release tablet Take 1 tablet by mouth daily 12/15/20   Mary Ann Augustin MD apixaban (ELIQUIS) 5 MG TABS tablet Take 10mg by mouth two times daily for 6 days followed by 5mg by mouth two times daily 11/19/20   Frankie Banuelos MD   ferrous sulfate (IRON 325) 325 (65 Fe) MG tablet Take 1 tablet by mouth 2 times daily (with meals) 11/6/20   Latoya Abraham MD   albuterol sulfate HFA (PROVENTIL HFA) 108 (90 Base) MCG/ACT inhaler Inhale 2 puffs into the lungs every 6 hours as needed for Wheezing 11/6/20   Latoya Abraham MD   vitamin C (ASCORBIC ACID) 500 MG tablet Take 2 tablets by mouth daily 9/15/20   SANDRA Monroe - CNP   CHOLECALCIFEROL PO Take 2,000 Units by mouth daily    Historical Provider, MD   atorvastatin (LIPITOR) 40 MG tablet Take 40 mg by mouth nightly    Historical Provider, MD   acetaminophen (TYLENOL) 325 MG tablet Take 650 mg by mouth every 4 hours as needed for Pain    Historical Provider, MD   busPIRone (BUSPAR) 10 MG tablet Take 10 mg by mouth 2 times daily     Historical Provider, MD   allopurinol (ZYLOPRIM) 300 MG tablet Take 1 tablet by mouth daily 4/9/20   Martha Guardian, DO   Multiple Vitamins-Minerals (THERAPEUTIC MULTIVITAMIN-MINERALS) tablet Take 1 tablet by mouth daily    Historical Provider, MD   Probiotic Product (SOBIA-BID PROBIOTIC PO) Take 1 tablet by mouth daily     Historical Provider, MD   ondansetron (ZOFRAN) 4 MG tablet Take 4 mg by mouth every 8 hours as needed for Nausea or Vomiting    Historical Provider, MD   folic acid (FOLVITE) 1 MG tablet Take 1 mg by mouth daily    Historical Provider, MD   furosemide (LASIX) 40 MG tablet Take 40 mg by mouth daily     Historical Provider, MD   sitaGLIPtan-metformin (JANUMET)  MG per tablet Take 1 tablet by mouth 2 times daily (with meals)     Historical Provider, MD   senna (SENOKOT) 8.6 MG tablet Take 1 tablet by mouth 2 times daily    Historical Provider, MD   ARIPiprazole (ABILIFY PO) Take 15 mg by mouth daily     Historical Provider, MD   Citalopram Hydrobromide (CELEXA PO) Take 30 mg by mouth daily From [employer]     Historical Provider, MD   Blood Glucose Monitoring Suppl (FREESTYLE LITE) ARTIE Patient needs all supplies for qd testing. DX: 250.00 8/18/11   Aruna Pacheco MD       Current medications:    No current facility-administered medications for this encounter. Allergies: Allergies   Allergen Reactions    Penicillins      Tolerated Zosyn 9/24/2020       Problem List:    Patient Active Problem List   Diagnosis Code    History of atrial fibrillation Z86.79    BPH (benign prostatic hyperplasia) N40.0    Carpal tunnel syndrome on right G56.01    Chronic gout M1A. 9XX0    DM2 (diabetes mellitus, type 2) ([facility] [address] E11.9    Heart failure with preserved ejection fraction (HCC) I50.30    History of alcohol abuse F10.11    History of osteomyelitis Z87.39    Hypogonadism, male E29.1    Major depression F32.9    Morbid obesity (HCC) E66.01    DURAN (nonalcoholic steatohepatitis) K75.81    KIARA treated with BiPAP G47.33    Vitamin D deficiency E55.9    Normocytic anemia D64.9    Physical deconditioning R53.81    Mood disorder (HCC) F39    Hallucinations R44.3    GERD (gastroesophageal reflux disease) K21.9    Wound of buttock S31.809A    Primary osteoarthritis of left hip M16.12    Acute on chronic anemia D64.9    Dysphagia R13.10    Hypertension, essential I10    Dyslipidemia E78.5    Aortic stenosis, mild to moderate I35.0    Perirectal abscess K61.1    Lesion of right lobe of liver K76.9    Hepatic steatosis K76.0    Elevated alkaline phosphatase level R74.8    Anticoagulated Z79.01    Leukocytosis D72.829    Hx of Chest pain, atypical R07.89    Pre-op evaluation for back injection Z01.818       Past Medical History:        Diagnosis Date    Aortic stenosis, mild to moderate     BPH (benign prostatic hyperplasia)     Carpal tunnel syndrome on right     rt hand    Chronic gout     COVID-19 09/2020    DM2 (diabetes mellitus, type 2) ([facility] [address]     Dyslipidemia     GERD (gastroesophageal reflux disease)     Heart failure with preserved ejection fraction (HCC)     History of alcohol abuse     History of atrial fibrillation     History of osteomyelitis     Hx of R foot wound, osteomyelitis July 2018 requiring surgery and IV Vanco    Hyperlipidemia     Hypertension, essential     Hypogonadism, male     Major depression     Mood disorder ([facility] [address]     Morbid obesity ([facility] [address]     DURAN (nonalcoholic steatohepatitis)     KIARA treated with BiPAP     Vitamin D deficiency        Past Surgical History:        Procedure Laterality Date    COLONOSCOPY N/A 11/15/2020    COLONOSCOPY DIAGNOSTIC performed by Tracey Starks MD at [facility]    ENDOSCOPY, COLON, DIAGNOSTIC      FOOT SURGERY Right     2018, R foot osteomyelitis    HIP SURGERY Left 6/29/2020    bilateral hip steroid injection, Left HIP FIRST performed by Kristen Root MD at [facility] N/A 10/26/2020    [address] [facility] performed by Isela Quinn MD at [facility]      as a baby    UPPER GASTROINTESTINAL ENDOSCOPY N/A 11/14/2020    EGD DIAGNOSTIC ONLY performed by Tracey Starks MD at [facility] N/A 12/27/2021    EGD performed by Tracey Starks MD at [facility]       Social History:    Social History     Tobacco Use    Smoking status: Never Smoker    Smokeless tobacco: Never Used   Substance Use Topics    Alcohol use: Not Currently     Comment: hx of abuse                                Counseling given: Not Answered      Vital Signs (Current):   Vitals:    04/11/22 0757   BP: (!) 148/96   Pulse: 71   Resp: 20   Temp: 97.1 °F (36.2 °C)   TempSrc: Temporal   SpO2: 93%   Weight: 299 lb 3.2 oz (135.7 kg)   Height: 5' 8\" (1.727 m)                                              BP Readings from Last 3 Encounters:   04/11/22 (!) 148/96   03/29/22 136/82   03/14/22 124/82       NPO Status: Time of last liquid consumption: 2200                        Time of last solid consumption: 2030                        Date of last liquid consumption: 04/10/22                        Date of last solid food consumption: 04/10/22    BMI:   Wt Readings from Last 3 Encounters:   04/11/22 299 lb 3.2 oz (135.7 kg)   03/29/22 (!) 304 lb 3.2 oz (138 kg)   03/14/22 290 lb (131.5 kg)     Body mass index is 45.49 kg/m². CBC:   Lab Results   Component Value Date    WBC 9.6 03/10/2022    RBC 5.07 03/10/2022    RBC 4.55 04/15/2012    HGB 15.4 03/10/2022    HCT 46.0 03/10/2022    MCV 90.7 03/10/2022    RDW 19.9 04/06/2020     03/10/2022       CMP:   Lab Results   Component Value Date     03/10/2022    K 4.2 03/10/2022     03/10/2022    CO2 25 03/10/2022    BUN 22 03/10/2022    CREATININE 1.4 03/10/2022    GFRAA >60 01/23/2019    AGRATIO 0.8 06/24/2018    LABGLOM 64 03/10/2022    GLUCOSE 130 03/10/2022    GLUCOSE 113 06/24/2018    PROT 7.2 03/04/2022    CALCIUM 9.5 03/10/2022    BILITOT 0.6 03/04/2022    ALKPHOS 159 03/04/2022    AST 67 03/04/2022    ALT 46 03/04/2022       POC Tests: No results for input(s): POCGLU, POCNA, POCK, POCCL, POCBUN, POCHEMO, POCHCT in the last 72 hours.     Coags:   Lab Results   Component Value Date    PROTIME 24.4 06/27/2018    INR 1.14 11/07/2020    APTT 162.5 11/18/2020       HCG (If Applicable): No results found for: PREGTESTUR, PREGSERUM, HCG, HCGQUANT     ABGs: No results found for: PHART, PO2ART, RFH1KMM, CBG6IJM, BEART, W4QOJOIJ     Type & Screen (If Applicable):  Lab Results   Component Value Date    LABRH POS 11/07/2020       Drug/Infectious Status (If Applicable):  Lab Results   Component Value Date    HEPCAB Negative 10/15/2019       COVID-19 Screening (If Applicable):   Lab Results   Component Value Date    COVID19 NOT DETECTED 09/06/2021    COVID19 NOT DETECTED 11/05/2020           Anesthesia Evaluation    Airway: Mallampati: III        Dental:          Pulmonary:   (+) sleep apnea:                             Cardiovascular:    (+) hypertension:,                   Neuro/Psych:   (+) neuromuscular disease:, psychiatric history:            GI/Hepatic/Renal:   (+) GERD:, liver disease:,           Endo/Other:    (+) Diabetes, . Abdominal:   (+) obese,           Vascular: Other Findings:             Anesthesia Plan      MAC     ASA 4             Anesthetic plan and risks discussed with patient.                       Lianet Harding MD   4/11/2022

## 2022-04-11 NOTE — H&P
H&P    Patient has not FU since July 20 th 2020. Never had L-facet MBB that was ordered that time as he had COVID in September 2020 and was in the hospital for 40 days and then [facility] for another 4 months. Sent back here from neurosurgery       Patient has pain in low back bilaterally constant aching pain and pain down bilateral legs foot to calf- sharp and tingling pain   Patient reports that pain is equally bothersome in low back and legs   Patient is a diabetic  Was not able to get EMG in past d/t leg swelling   Also has joint pains and follows with Rheumatology   In wheel chair today also has mila d cane and states that he uses rolling walker his house.      Pain limits  walking, standing and activity and patient needs frequent rest breaks.       Pain increases with bending, lifting, twisting , walking, standing, getting up and down and housework or working at job.     He is on Norco 5/325 QID, Also on Lyrica 150 mg TID prn     Medications reviewed. Patient denies side effects with medications. Patient states he is taking medications as prescribed. Hedenies receiving pain medications from other sources.      Pain scale with out pain medications or at its worst is 8-9/10. Pain scale with pain medications or at its best is 6/10. Last dose of Norco and Lyrica was yesterday      Lumbar MRI:  FINDINGS:   There is biconcave appearance of the lumbar vertebrae of L2-L5, stable compared to prior exam. There is otherwise anatomic vertebral body height and alignment. Marrow signal is normal. There is congenital spinal canal narrowing throughout the lumbar    spine. The conus terminates in a normal fashion at the L1 level. Paraspinal soft tissues are unremarkable. At T12-L1 there is mild congenital spinal canal. There is mild right neural foraminal stenosis in association with mild osteophyte and facet hypertrophy and ligamentum flavum thickening. At L1-2 there is mild congenital spinal canal narrowing.  There is mild bilateral neural foraminal stenosis in association with facet hypertrophy and ligamentum flavum thickening. At L2-3 there is moderate congenital spinal canal narrowing. There is mild bilateral neural foraminal stenosis in association with facet hypertrophy and ligamentum flavum thickening. At L3-4 there is moderate spinal canal stenosis from congenital spinal canal narrowing and facet hypertrophy and ligamentum flavum thickening with mildly prominent epidural fat. There is near complete effacement of CSF from the thecal sac at this level. There is mild bilateral neural foraminal stenosis. At L4-5 there is a minimal disc bulge which contributes to moderate spinal canal stenosis in association with congenital spinal canal narrowing, facet hypertrophy and ligament flavum thickening and mildly prominent epidural fat. There is near complete    effacement of CSF from the thecal sac at this level. There is mild to moderate bilateral foraminal stenosis. At L5-S1 there is mild congenital spinal canal narrowing. There is mild to moderate bilateral foraminal stenosis in association with facet hypertrophy and ligamentum flavum thickening.           Impression       1. Congenital spinal canal narrowing with superimposed facet hypertrophy and ligamentum flavum thickening with up to moderate spinal canal stenosis at L3-4 and L4-5 and up to mild to moderate neural foraminal stenosis at the L4-5 and L5-S1 levels. 2. Biconcave appearance of L2-L5 vertebral bodies with scalloping of the superior and inferior endplates likely on the basis of osteoporosis. There is no edema to suggest an acute process.          Lumbar CT scan:   FINDINGS:           The lumbar vertebral bodies are normally aligned.  There are no compression fractures.  No pars defects are noted.  There is a congenitally narrow AP diameter of the lumbar spinal canal.       There are no gross abnormalities within the spinal canal.       On the axial images, at T8-9 disc no disc herniation, canal or foraminal stenosis.       At T9-T10 there is mild canal and mild-to-moderate bilateral foraminal stenosis.       At T10-11 there is mild canal and mild-to-moderate bilateral foraminal stenosis.       At T11-12 is mild canal and mild-to-moderate bilateral foraminal stenosis.       At T12-L1 there is mild canal and mild-to-moderate bilateral foraminal stenosis.       At L1-L2, there is mild canal and mild-to-moderate bilateral foraminal stenosis.       At L2-3, there is mild-to-moderate canal and bilateral foraminal stenosis.       At L3-4, there is mild-to-moderate canal and bilateral foraminal stenosis.       At L4-5, there is mild-to-moderate canal and moderate bilateral foraminal stenosis       At L5-S1, there is mild canal and moderate severe bilateral foraminal stenosis.       There is degenerative change involving the sacroiliac joints bilaterally.       There is slightly increased attenuation in the subcutaneous soft tissues dorsally over the lower thoracic and lumbar spine.               Impression       1. Congenitally narrow AP diameter of the lumbar spinal canal with superimposed degenerative changes resulting in mild-to-moderate canal and moderate bilateral foraminal stenosis at L4-5.   2. There is mild canal and moderate to severe bilateral foraminal stenosis at L5-S1.   3. There is mild/moderate canal and bilateral foraminal stenosis at L2-3 and L3-4.   4. There is mild canal and mild-to-moderate bilateral foraminal stenosis at T9-T10, T10-11, T11-12, T12-L1 and L1-2.   5. There is degenerative change involving the sacroiliac joints bilaterally. 6. Slightly increased attenuation in the subcutaneous soft tissues dorsally over the lower thoracic and lumbar spine.             The patientis allergic to penicillins.        Subjective:      Review of Systems   Constitutional: Positive for activity change and fatigue.  Negative for appetite change, chills, diaphoresis, fever and unexpected weight change. HENT: Negative. Eyes: Negative. Respiratory: Negative. Cardiovascular: Positive for leg swelling. Negative for chest pain. Gastrointestinal: Negative. Genitourinary: Negative. Musculoskeletal: Positive for arthralgias, back pain, gait problem, joint swelling and myalgias. Negative for neck pain and neck stiffness. Skin: Negative. Neurological: Positive for weakness and numbness. Psychiatric/Behavioral: Negative.          Objective:      Vitals       Vitals:     03/03/22 0831   BP: 136/80   Weight: 290 lb (131.5 kg)   Height: 5' 8\" (1.727 m)            Physical Exam  Constitutional:       Appearance: Normal appearance. HENT:      Head: Normocephalic and atraumatic. Right Ear: External ear normal.      Left Ear: External ear normal.      Nose: Nose normal.      Mouth/Throat:      Mouth: Mucous membranes are moist.      Pharynx: Oropharynx is clear. Eyes:      General:         Right eye: No discharge. Extraocular Movements: Extraocular movements intact. Conjunctiva/sclera: Conjunctivae normal.      Pupils: Pupils are equal, round, and reactive to light. Cardiovascular:      Rate and Rhythm: Normal rate and regular rhythm. Pulses: Decreased pulses. Pulmonary:      Effort: Pulmonary effort is normal.      Breath sounds: Normal breath sounds. Abdominal:      General: Abdomen is flat. Bowel sounds are normal.   Musculoskeletal:         General: Tenderness present. Right shoulder: Decreased range of motion. Left shoulder: Decreased range of motion. Right wrist: Decreased range of motion. Right hand: Decreased range of motion. Cervical back: Normal range of motion and neck supple. Tenderness and bony tenderness present. Lumbar back: Tenderness and bony tenderness present. Decreased range of motion.  Positive right straight leg raise test and positive left straight leg raise test.        Back:       Right hip: Tenderness and bony tenderness present. Decreased range of motion. Decreased strength. Left hip: Tenderness and bony tenderness present. Decreased range of motion. Decreased strength. Right upper leg: Tenderness and bony tenderness present. Right knee: Swelling present. Decreased range of motion. Tenderness present. Left knee: Swelling present. Decreased range of motion. Tenderness present. Right lower leg: Swelling, tenderness and bony tenderness present. 2+ Pitting Edema present. Left lower leg: Swelling, tenderness and bony tenderness present. 2+ Pitting Edema present. Right ankle: Swelling present. Tenderness present. Decreased range of motion. Left ankle: Swelling present. Tenderness present. Decreased range of motion. Left foot: Decreased range of motion. Tenderness present. Skin:     General: Skin is warm and dry. Neurological:      General: No focal deficit present. Mental Status: He is alert and oriented to person, place, and time. Sensory: Sensory deficit present. Motor: Weakness present. Coordination: Coordination abnormal.      Gait: Gait abnormal.      Deep Tendon Reflexes:      Reflex Scores:       Tricep reflexes are 2+ on the right side and 2+ on the left side. Bicep reflexes are 2+ on the right side and 2+ on the left side. Brachioradialis reflexes are 2+ on the right side and 2+ on the left side. Patellar reflexes are 1+ on the right side and 1+ on the left side. Achilles reflexes are 1+ on the right side and 1+ on the left side. Comments: Strength 3- 4/5 bilateral lower extremities     Numbness and decreased sensation bilateral feet. Psychiatric:         Mood and Affect: Mood normal.         BIGG  Patricks test  positive  Yeoman's  or Gaenslen;s positive        Assessment:      1. Lumbar spondylosis    2. Lumbar facet arthropathy    3. Lumbosacral radiculitis    4.  Spinal stenosis of lumbar region with neurogenic claudication    5. Arthralgia, unspecified joint    6. Chronic pain syndrome       Plan:      · OARRS reviewed. Current MED: 30.00  · Patient was offered naloxone for home. · Discussed long term side effects of medications, tolerance, dependency and addiction. · Previous UDS reviewed  · UDS preformed today for compliance. · Patient told can not receive any pain medications from any other source. · No evidence of abuse, diversion or aberrant behavior. · Medications and/or procedures to improve function and quality of life- patient understanding with this and that may not be pain free  · Discussed with patient about safe storage of medications at home  · Discussed possible weaning of medication dosing dependent on treatment/procedure results. · Discussed with patient about risks with procedure including infection, reaction to medication, increased pain, or bleeding. · Reviewed neurosurgery notes, and CT scan and Lumbar MRI   · Plan bilateral L-facet MBB # 1 @ L4-5 and L5-S1 with any provider for diagnostic purpose. Procedure and risks discussed in detail with patient.   · Discussed possible LESI, TFLESI in future   · Continue medications from [facility]- Dane and Lyrica  · If patient is on blood thinners will need approval to hold yes- On Eliquis needs cardiology clearance                 Return for bilateral L-facet MBB # 1 @ L4-5 and L5-S1 with any provider.  FU after.

## 2022-04-11 NOTE — H&P
[address] [id number]  History and Physical Update    Pt Name: Yessica Rios  MRN: [id number]  YOB: 1970  Date of evaluation: 4/11/2022      I have examined the patient and reviewed the H&P/Consult and there are no changes to the patient or plans.         Electronically signed by Marilou Sweeney MD on 4/11/2022 at 8:07 AM

## 2022-04-11 NOTE — PROGRESS NOTES
0835-patient to Phase II via cart. Report received from [facility]. Patient not responding on command. Vitals obtained and stable. 0840-Patient with history of sleep apnea. Snoring at times. SpO2 decreases 85% on room air. Patient arouses on command with increase in SpO2 to 96% on room air. Patient opens eyes but does not verbalize. 0845-Patient remains drowsy. SpO2 96% on room air. Patient continues to have periods of apnea. 0855-Patient drowsy but responding easier to command. Dr. Blaise Green updated. Wants patient to continue on pulse ox. Will monitor. 0915-Patient Continues to rest. Patient awakens to take drink. SpO2 94% on room air.   0925-Patient awake and talking. Tolerating snack and drink. Denies needs.

## 2022-04-11 NOTE — ANESTHESIA POSTPROCEDURE EVALUATION
Department of Anesthesiology  Postprocedure Note    Patient: Brittny Alejandre  MRN: [id number]  YOB: 1970  Date of evaluation: 4/11/2022  Time:  10:32 AM     Procedure Summary     Date: 04/11/22 Room / Location: [address] [place] [address] 03 [facility]    Anesthesia Start: 3385 Anesthesia Stop: 5503    Procedure: bilateral L-facet MBB # 1 @ L4-5 and L5-S1 (Bilateral Back) Diagnosis: (OA of both hips)    Surgeons: Sara Hurt MD Responsible Provider: Francois Carvalho MD    Anesthesia Type: MAC ASA Status: 4          Anesthesia Type: MAC    Atilio Phase I: Atilio Score: 5    Atilio Phase II: Atilio Score: 9    Last vitals: Reviewed and per EMR flowsheets.        Anesthesia Post Evaluation    Complications: no

## 2022-04-13 PROBLEM — Z01.818 PRE-OP EVALUATION: Status: RESOLVED | Noted: 2022-03-14 | Resolved: 2022-04-13

## 2022-04-27 NOTE — PROGRESS NOTES
[place] [facility] Sleep and [facility] Follow up note    Theresa Cowan                                             Chief complaint and [place]: Theresa Cowan is a 46 y. o.oldmale came for follow up regarding his sleep apnea. He is currently using his positive airway pressure device with a CPAP pressure of 25/20cm H20. He denies any problems with his current BiPAP machine. He was issued a new BiPAP device after having in lab retitration study on 9 February 2022. His old full facemask got broken leading to poor compliance for more than 4 hours of BiPAP therapy. His fullface mask was replaced by his [employer]. He is currently using his BiPAP device with good compliance for the last few days. He is waking up at nighttime with the dry mouth despite using full facemask. He is currently not using chinstrap. Heddy  He is sleeping well at night with out difficulty. He denies any daytime sleepiness. Review of Systems:   General/Constitutional: he lost 3lbs of weight from the last visit with normal appetite. No fever or chills. HENT: Negative. Eyes: Negative. Upper respiratory tract: No nasal stuffiness or post nasal drip. Lower respiratory tract/ lungs: No cough or sputum production. No hemoptysis. Cardiovascular: No palpitations or chest pain. Gastrointestinal: No nausea or vomiting. Neurological: No focal neurologiacal weakness. Extremities: No edema. Musculoskeletal: No complaints. Genitourinary: No complaints. Hematological: Negative. Psychiatric/Behavioral: Negative. Skin: No itching.         Past Medical History:   Diagnosis Date    Aortic stenosis, mild to moderate     BPH (benign prostatic hyperplasia)     Carpal tunnel syndrome on right     rt hand    Chronic gout     COVID-19 09/2020    DM2 (diabetes mellitus, type 2) ([facility] [address]     Dyslipidemia     GERD (gastroesophageal reflux disease)     Heart failure with preserved ejection fraction [facility])     History of alcohol abuse     History of atrial fibrillation     History of osteomyelitis     Hx of R foot wound, osteomyelitis July 2018 requiring surgery and IV Vanco    Hyperlipidemia     Hypertension, essential     Hypogonadism, male     Major depression     Mood disorder ([facility] [address]     Morbid obesity ([facility] [address]     DURAN (nonalcoholic steatohepatitis)     KIARA treated with BiPAP     Vitamin D deficiency        Past Surgical History:   Procedure Laterality Date    COLONOSCOPY N/A 11/15/2020    COLONOSCOPY DIAGNOSTIC performed by Maritza Esquivel MD at [facility]    ENDOSCOPY, COLON, DIAGNOSTIC      FOOT SURGERY Right     2018, R foot osteomyelitis    HIP SURGERY Left 6/29/2020    bilateral hip steroid injection, Left HIP FIRST performed by Dveon Garcia MD at [place] [address] Bilateral 4/11/2022    bilateral L-facet MBB # 1 @ L4-5 and L5-S1 performed by Devon Garcia MD at [facility] N/A 5/23/2022    LESI  @ L5. performed by Devon Garcia MD at [facility] N/A 10/26/2020    SIGMOIDOSCOPY DIAGNOSTIC FLEXIBLE performed by Dakota Cannon MD at [facility]      as a baby    UPPER GASTROINTESTINAL ENDOSCOPY N/A 11/14/2020    EGD DIAGNOSTIC ONLY performed by Maritza Esquivel MD at [address] [place] [address] N/A 12/27/2021    EGD performed by Maritza Esquivel MD at [facility]       Social History     Tobacco Use    Smoking status: Never Smoker    Smokeless tobacco: Never Used   Vaping Use    Vaping Use: Never used   Substance Use Topics    Alcohol use: Not Currently     Comment: hx of abuse    Drug use: No       Allergies   Allergen Reactions    Penicillins      Tolerated Zosyn 9/24/2020       Current Outpatient Medications   Medication Sig Dispense Refill    pantoprazole (PROTONIX) 40 MG tablet Take 1 tablet by mouth 2 times daily (before meals) 60 tablet 0    hydrALAZINE (APRESOLINE) 50 MG tablet Take 1 tablet by mouth 3 times daily 90 tablet 3    Cholecalciferol (VITAMIN D3) 50 MCG (2000 UT) CAPS TAKE ONE CAPSULE BY MOUTH ONCE DAILY      HYDROcodone-acetaminophen (NORCO) 5-325 MG per tablet TAKE 1 TABLET BY MOUTH EVERY 4-TO-6 HOURS AS NEEDED      GLIPIZIDE XL 5 MG extended release tablet TAKE ONE TABLET BY MOUTH ONCE DAILY      losartan-hydroCHLOROthiazide (HYZAAR) 50-12.5 MG per tablet TAKE ONE TABLET BY MOUTH ONCE DAILY      potassium chloride (KLOR-CON) 10 MEQ extended release tablet TAKE ONE TABLET BY MOUTH ONCE DAILY      pregabalin (LYRICA) 150 MG capsule TAKE ONE CAPSULE BY MOUTH THREE TIMES DAILY      secukinumab (COSENTYX) 150 MG/ML SOSY Inject 150mg subcu weekly for 5 weeks then 4 weeks later start 150mg subcu every 4 weeks. 5 mL 2    clotrimazole-betamethasone (LOTRISONE) 1-0.05 % cream Apply topically 2 times daily.  1 each 0    potassium chloride (KLOR-CON M) 20 MEQ extended release tablet Take 1 tablet by mouth daily 30 tablet 2    apixaban (ELIQUIS) 5 MG TABS tablet Take 10mg by mouth two times daily for 6 days followed by 5mg by mouth two times daily 60 tablet 1    ferrous sulfate (IRON 325) 325 (65 Fe) MG tablet Take 1 tablet by mouth 2 times daily (with meals) 30 tablet 3    albuterol sulfate HFA (PROVENTIL HFA) 108 (90 Base) MCG/ACT inhaler Inhale 2 puffs into the lungs every 6 hours as needed for Wheezing 1 Inhaler 3    vitamin C (ASCORBIC ACID) 500 MG tablet Take 2 tablets by mouth daily 30 tablet 0    CHOLECALCIFEROL PO Take 2,000 Units by mouth daily      atorvastatin (LIPITOR) 40 MG tablet Take 40 mg by mouth nightly      acetaminophen (TYLENOL) 325 MG tablet Take 650 mg by mouth every 4 hours as needed for Pain      busPIRone (BUSPAR) 10 MG tablet Take 10 mg by mouth 2 times daily       allopurinol (ZYLOPRIM) 300 MG tablet Take 1 tablet by mouth daily 90 tablet 0    Multiple Vitamins-Minerals (THERAPEUTIC MULTIVITAMIN-MINERALS) tablet Take 1 tablet by mouth daily      Probiotic Product (SOBIA-BID PROBIOTIC PO) Take 1 tablet by mouth daily       ondansetron (ZOFRAN) 4 MG tablet Take 4 mg by mouth every 8 hours as needed for Nausea or Vomiting      folic acid (FOLVITE) 1 MG tablet Take 1 mg by mouth daily      furosemide (LASIX) 40 MG tablet Take 40 mg by mouth daily       sitaGLIPtan-metformin (JANUMET)  MG per tablet Take 1 tablet by mouth 2 times daily (with meals)       senna (SENOKOT) 8.6 MG tablet Take 1 tablet by mouth 2 times daily      ARIPiprazole (ABILIFY PO) Take 15 mg by mouth daily       Citalopram Hydrobromide (CELEXA PO) Take 30 mg by mouth daily From [facility] professional services       Blood Glucose Monitoring Suppl (FREESTYLE LITE) ARTIE Patient needs all supplies for qd testing. DX: 250.00 1 Device 11     No current facility-administered medications for this visit. Family History   Problem Relation Age of Onset    Heart Disease Mother         MI    Cancer Paternal Aunt         lung          BP (!) 142/86 (Site: Left Lower Arm, Position: Sitting, Cuff Size: Medium Adult)   Pulse 72   Temp 98.4 °F (36.9 °C) (Oral)   Ht 5' 8\" (1.727 m)   Wt 287 lb 6.4 oz (130.4 kg)   SpO2 94%   BMI 43.70 kg/m²   BMI:  Body mass index is 43.7 kg/m². Mallampati airway Class: 4  Neck Circumference: 20.5  Dermott sleepiness score 5/27/22: 7  SAQLI: 85    Physical Exam :  Constitutional: Patient appears well built and well nourished. No distress. Patient is oriented to person, place, and time. HENT:   Head: Normocephalic and atraumatic. Right Ear: External ear normal.   Left Ear: External ear normal.   Mouth/Throat: Oropharynx is clear and moist.   Eyes: Conjunctivae are normal. Pupils are equal and reactive to light. No scleral icterus. Neck: Neck supple. No JVD present. Cardiovascular: Normal rate, regular rhythm, normal heart sounds.  No murmur heard.   Pulmonary/Chest: Effort normal and breath sounds normal. No stridor. No respiratory distress. No wheezes. No rales. Abdominal: Soft. Patient exhibits no distension. No tenderness. Musculoskeletal: Normal range of motion. Extremities:Patient exhibits no edema and no tenderness. Chronic skin changes with hyperpigmentation. Lymphadenopathy:  No cervical adenopathy. Neurological: Patient is alert and oriented to person, place, and time. Skin: Skin is warm and dry. Patient is not diaphoretic. Psychiatric: Patient  has a normal mood and affect. Diagnostic Data:    Split night sleep study:    SLEEP STUDY REPORT     PATIENT NAME: Wilson Skiff                :        1970  MED REC NO:   [id number]                           ROOM:  ACCOUNT NO:   [de-identified]                           ADMIT DATE: 2022  PROVIDER:     Zehra Mcnamara MD     DATE OF STUDY:  2022     REFERRING PROVIDER:  Zehra Mcnamara MD     IMPRESSION:  1. Severe obstructive sleep apnea, diagnosed by a split night sleep  study performed on 2020. The patient had suboptimal re-titration  to a final BiPAP pressure of 20/20  cm of water with room air. 2.  At a final BiPAP pressure of 25/20 cm of water, the patient was  still found to have persistence of residual apnea-hypopnea index of  17.4. However, the patient's hypoxia got better. Majority of the time,  the oxygen saturation remained between 90 to 95% with room air. The  patient maxed out on his BiPAP pressure settings. 3.  Hypersomnia, most likely due to sleep apnea. 4.  Hypertension. 5.  Depression. 6.  Type 2 diabetes mellitus. 7.  Nonalcoholic steatohepatitis (DURAN). 8.  Sarcoidosis. PAP Download:   Recorded compliance dates:22-22  More than 4hour usage compliance was:43%.   Average residual Apnea- Hypoapnea index on current pressue was:1.3       PAP Type spont   Level  25/20      Average usuage hours per day was: 4hr 57min          Interface: FFM     Provider:  [x]? [facility]             []?[facility]                        []?[facility]          []? [employer]                           []?[facility]      []? Other:         Assesment:  -Severe obstructive sleep apnea on treatment with BiPAP pressure of 25/20cm H20 - he is using his CPAP device with poor compliance to >4 hours of therapy due to broken mask and frequent nocturnal awakenings with dry mouth despite using fullface mask. However, his respiratory events were under good control with current therapy. His clinical symptoms are improving. He is benefiting from current CPAP therapy and would like to continue the therapy.  -Hx of Hypersomnia, most likely due to sleep apnea-improving.  -Hypertension-under moderate control. He denies any chest pain, headache or dizziness at this time. -Depression on meds. -Type II or unspecified type diabetes mellitus without mention of complication, not stated as uncontrolled. - DURAN (nonalcoholic steatohepatitis). -Sarcoidosis- he follows with Dr. Leatha Angelo MD.       Recommendations/Plan:  -He was advised to submit his down load report from his BiPAP for next 1month starting from today to document his > 4hour compliance. He was informed about the possibility of loosing his BiPAP if he don't use it with good compliance for >4hours i.e >70%. He verbalizes understanding.  -He was instructed call his family physician/Primary care physician as soon as possible for optimization of his anti HTN medications. He verbalizes understanding.  -He was instructed to extend his sleep schedule to 7 to 9 hours in a given 24hour period continuously.   -He was educated and advised to apply his current mask properly and tight enough to minimize leaks.  -Patient will be given a prescription for chin strap to use with his current mask to avoid leaks and to improve his dryness of mouth.  -He is aware of the consequences of poor compliance to his recommended positive air way pressure therapy including increased risk for cardiovascular and cerebrovascular events and morbidity including death.  -He was informed about the possibility of loosing his BiPAP if he don't use it with good compliance for >4hours i.e >70%. He verbalizes understanding.  -He was advised to continue current positive airway pressure therapy with above described pressure.  -He was advised to keep good compliance with current recommended pressure to get optimal results and clinical improvement.  -Follow with my clinic in 3months for clinical reevaluation with review of download. -He was advised to call [employer] regarding supplies if needed. -He was advised to continue to practice good sleep hygiene practices.  -He was advised to loose weight by controlling diet and doing exercise.  -He was advised to call my office for earlier appointment if needed for worsening of sleep symptoms.  -Anthony Malin was educated about my impression and plan. Patient verbalizes understanding.      -I personally reviewed updated the Past medical hx, Past surgical hx,Social hx, Family hx, Medications, Allergies in the discrete data section of the patient chart along with labs, Pulmonary medicine,Sleep medicine related, Pathological, Microbiological and Radiological investigations.

## 2022-04-28 ENCOUNTER — OFFICE VISIT (OUTPATIENT)
Dept: PHYSICAL MEDICINE AND REHAB | Age: 52
End: 2022-04-28
Payer: MEDICARE

## 2022-04-28 VITALS
WEIGHT: 299 LBS | HEIGHT: 68 IN | BODY MASS INDEX: 45.31 KG/M2 | SYSTOLIC BLOOD PRESSURE: 130 MMHG | DIASTOLIC BLOOD PRESSURE: 70 MMHG

## 2022-04-28 DIAGNOSIS — M25.50 ARTHRALGIA, UNSPECIFIED JOINT: ICD-10-CM

## 2022-04-28 DIAGNOSIS — M54.17 LUMBOSACRAL RADICULITIS: Primary | ICD-10-CM

## 2022-04-28 DIAGNOSIS — M48.062 SPINAL STENOSIS OF LUMBAR REGION WITH NEUROGENIC CLAUDICATION: ICD-10-CM

## 2022-04-28 DIAGNOSIS — G89.4 CHRONIC PAIN SYNDROME: ICD-10-CM

## 2022-04-28 DIAGNOSIS — M47.816 LUMBAR SPONDYLOSIS: ICD-10-CM

## 2022-04-28 DIAGNOSIS — M47.816 LUMBAR FACET ARTHROPATHY: ICD-10-CM

## 2022-04-28 PROCEDURE — G8427 DOCREV CUR MEDS BY ELIG CLIN: HCPCS | Performed by: NURSE PRACTITIONER

## 2022-04-28 PROCEDURE — 1036F TOBACCO NON-USER: CPT | Performed by: NURSE PRACTITIONER

## 2022-04-28 PROCEDURE — G8417 CALC BMI ABV UP PARAM F/U: HCPCS | Performed by: NURSE PRACTITIONER

## 2022-04-28 PROCEDURE — 99214 OFFICE O/P EST MOD 30 MIN: CPT | Performed by: NURSE PRACTITIONER

## 2022-04-28 PROCEDURE — 3017F COLORECTAL CA SCREEN DOC REV: CPT | Performed by: NURSE PRACTITIONER

## 2022-04-28 RX ORDER — HYDROCODONE BITARTRATE AND ACETAMINOPHEN 5; 325 MG/1; MG/1
1 TABLET ORAL EVERY 6 HOURS PRN
Qty: 42 TABLET | Refills: 0 | Status: SHIPPED | OUTPATIENT
Start: 2022-04-28 | End: 2022-05-31 | Stop reason: SDUPTHER

## 2022-04-28 ASSESSMENT — ENCOUNTER SYMPTOMS
EYES NEGATIVE: 1
GASTROINTESTINAL NEGATIVE: 1
RESPIRATORY NEGATIVE: 1
BACK PAIN: 1

## 2022-04-28 NOTE — PROGRESS NOTES
[address] [facility] [address]  Dept: [phone]  Dept Fax: [phone]: [phone]    Visit Date: 4/28/2022    Functionality Assessment/Goals Worksheet     On a scale of 0 (Does not Interfere) to 10 (Completely Interferes)     1. Which number describes how during the past week pain has interfered with       the following:  A. General Activity:  7  B. Mood: 8  C. Walking Ability:  8  D. Normal Work (Includes both work outside the home and housework):  8  E. Relations with Other People:   7  F. Sleep:   8  G. Enjoyment of Life:   7    2. Patient Prefers to Take their Pain Medications:     []  On a regular basis   []  Only when necessary    [x]  Does not take pain medications    3. What are the Patient's Goals/Expectations for Visiting Pain Management? []  Learn about my pain    [x]  Receive Medication   []  Physical Therapy     []  Treat Depression   [x]  Receive Injections    []  Treat Sleep   []  Deal with Anxiety and Stress   []  Treat Opoid Dependence/Addiction   []  Other:      HPI:   Leti Booker is a 46 y.o. male is here today for    Chief Complaint: Low back and leg pain     HPI   FU from bilateral L-facet MBB # 1 from 4/11/2022. Reports patient received that he received 80% relief of pain in low back initially and continues to receive 75% relief at this time. Low back pain is tolerable at this time and is better. Still has pain in low back aching and like a twitch. Main complaint is pain down bilateral legs foot to calf- sharp and tingling, numbing and throbbing   Gets muscle spasm in right leg     Pain is now 90% bothersome in legs   Out of pain medications as pcp wont prescribe Norco but they continue to prescribe Lyrica  Pain increases with bending, lifting, twisting , walking, standing and getting up and down.     States he is currently taking tylenol # 3 prescribe by his rheumatologist Dr. Roselyn Mares but they don't help as much        Medications reviewed. Patient denies side effects with medications. Patient states he is taking medications as prescribed. Hedenies receiving pain medications from other sources. He denies any ER visits since last visit. Pain scale with out pain medications or at its worst is 8/10. Pain scale with pain medications or at its best is 6/10. Last dose of Tylenol 3 was today   Drug screen reviewed from 3/3/2022 and was appropriate        The patientis allergic to penicillins. Subjective:      Review of Systems   Constitutional: Positive for activity change and fatigue. Negative for appetite change, chills, diaphoresis, fever and unexpected weight change. HENT: Negative. Eyes: Negative. Respiratory: Negative. Cardiovascular: Positive for leg swelling. Negative for chest pain. Gastrointestinal: Negative. Genitourinary: Negative. Musculoskeletal: Positive for arthralgias, back pain, gait problem, joint swelling and myalgias. Negative for neck pain and neck stiffness. Skin: Negative. Neurological: Positive for weakness and numbness. Psychiatric/Behavioral: Negative. Objective:     Vitals:    04/28/22 0926   BP: 130/70   Weight: 299 lb (135.6 kg)   Height: 5' 8\" (1.727 m)       Physical Exam  Constitutional:       Appearance: Normal appearance. HENT:      Head: Normocephalic and atraumatic. Right Ear: External ear normal.      Left Ear: External ear normal.      Nose: Nose normal.      Mouth/Throat:      Mouth: Mucous membranes are moist.      Pharynx: Oropharynx is clear. Eyes:      General:         Right eye: No discharge. Extraocular Movements: Extraocular movements intact. Conjunctiva/sclera: Conjunctivae normal.      Pupils: Pupils are equal, round, and reactive to light. Cardiovascular:      Rate and Rhythm: Normal rate and regular rhythm. Pulses: Decreased pulses. Pulmonary:      Effort: Pulmonary effort is normal.      Breath sounds: Normal breath sounds. Abdominal:      General: Abdomen is flat. Bowel sounds are normal.   Musculoskeletal:         General: Tenderness present. Right shoulder: Decreased range of motion. Left shoulder: Decreased range of motion. Right wrist: Decreased range of motion. Right hand: Decreased range of motion. Cervical back: Normal range of motion and neck supple. Tenderness and bony tenderness present. Lumbar back: Tenderness and bony tenderness present. Decreased range of motion. Positive right straight leg raise test and positive left straight leg raise test.        Back:       Right hip: Tenderness and bony tenderness present. Decreased range of motion. Decreased strength. Left hip: Tenderness and bony tenderness present. Decreased range of motion. Decreased strength. Right upper leg: Tenderness and bony tenderness present. Right knee: Swelling present. Decreased range of motion. Tenderness present. Left knee: Swelling present. Decreased range of motion. Tenderness present. Right lower leg: Swelling, tenderness and bony tenderness present. 2+ Pitting Edema present. Left lower leg: Swelling, tenderness and bony tenderness present. 2+ Pitting Edema present. Right ankle: Swelling present. Tenderness present. Decreased range of motion. Left ankle: Swelling present. Tenderness present. Decreased range of motion. Left foot: Decreased range of motion. Tenderness present. Skin:     General: Skin is warm and dry. Neurological:      General: No focal deficit present. Mental Status: He is alert and oriented to person, place, and time. Sensory: Sensory deficit present. Motor: Weakness present.       Coordination: Coordination abnormal.      Gait: Gait abnormal.      Deep Tendon Reflexes:      Reflex Scores:       Tricep reflexes are 2+ on the right side and 2+ on the left side. Bicep reflexes are 2+ on the right side and 2+ on the left side. Brachioradialis reflexes are 2+ on the right side and 2+ on the left side. Patellar reflexes are 1+ on the right side and 1+ on the left side. Achilles reflexes are 1+ on the right side and 1+ on the left side. Comments: Strength 4/5 bilateral lower extremities     Numbness and decreased sensation bilateral feet. Psychiatric:         Mood and Affect: Mood normal.          BIGG  Patricks test  positive  Yeoman's  or Gaenslen's positive     Assessment:     1. Lumbosacral radiculitis    2. Spinal stenosis of lumbar region with neurogenic claudication    3. Lumbar spondylosis    4. Lumbar facet arthropathy    5. Arthralgia, unspecified joint    6. Chronic pain syndrome            Plan:      · OARRS reviewed. Current MED: 30.00  · Patient was offered naloxone for home. · Discussed long term side effects of medications, tolerance, dependency and addiction. · Previous UDS reviewed  · UDS preformed today for compliance. · Patient told can not receive any pain medications from any other source. · No evidence of abuse, diversion or aberrant behavior.  Medications and/or procedures to improve function and quality of life- patient understanding with this and that may not be pain free   Discussed with patient about safe storage of medications at home   Discussed possible weaning of medication dosing dependent on treatment/procedure results.  Discussed with patient about risks with procedure including infection, reaction to medication, increased pain, or bleeding.  Procedure notes reviewed in detail    Received 80% relief of low back pain from L-facet MBB # 1 and continues to receive 75% relief. Discussed repeating when needing to.  Main complaint is radicular pain    Reveiwed Lumbar MRI and CT scan.  Plan LESI # 1 @ L4-5.  Procedure discussed with patient  · If patient is on blood thinners will need approval to hold yes- On Eliquis needs cardiology clearance   · Reviewed UDS appropriate. In good laura will prescribe Norco 5/325 TID prn at this time until relief from procedure. 14 day trial.  · Continue Lyrica from pcp   · Will get next UDS at procedure FU. Meds. Prescribed:   Orders Placed This Encounter   Medications    HYDROcodone-acetaminophen (NORCO) 5-325 MG per tablet     Sig: Take 1 tablet by mouth every 6 hours as needed for Pain for up to 14 days. Intended supply: 3 days. Take lowest dose possible to manage pain     Dispense:  42 tablet     Refill:  0     Reduce doses taken as pain becomes manageable       Return for NELSY # 1 @ L4-5. , follow up after procedure.                Electronically signed by SANDRA Mcmullen CNP on4/28/2022 at 2:38 PM

## 2022-05-09 ENCOUNTER — OFFICE VISIT (OUTPATIENT)
Dept: NEUROSURGERY | Age: 52
End: 2022-05-09
Payer: MEDICARE

## 2022-05-09 ENCOUNTER — OFFICE VISIT (OUTPATIENT)
Dept: CARDIOLOGY CLINIC | Age: 52
End: 2022-05-09
Payer: MEDICARE

## 2022-05-09 VITALS
SYSTOLIC BLOOD PRESSURE: 180 MMHG | BODY MASS INDEX: 45.31 KG/M2 | DIASTOLIC BLOOD PRESSURE: 82 MMHG | WEIGHT: 298.94 LBS | HEIGHT: 68 IN | HEART RATE: 77 BPM

## 2022-05-09 VITALS
WEIGHT: 292.8 LBS | SYSTOLIC BLOOD PRESSURE: 176 MMHG | BODY MASS INDEX: 44.38 KG/M2 | DIASTOLIC BLOOD PRESSURE: 108 MMHG | HEART RATE: 76 BPM | HEIGHT: 68 IN

## 2022-05-09 DIAGNOSIS — I10 HYPERTENSION, ESSENTIAL: Chronic | ICD-10-CM

## 2022-05-09 DIAGNOSIS — Z86.79 HISTORY OF ATRIAL FIBRILLATION: Chronic | ICD-10-CM

## 2022-05-09 DIAGNOSIS — R20.0 NUMBNESS AND TINGLING OF BOTH LOWER EXTREMITIES: ICD-10-CM

## 2022-05-09 DIAGNOSIS — M21.372 LEFT FOOT DROP: ICD-10-CM

## 2022-05-09 DIAGNOSIS — M47.816 LUMBAR SPONDYLOSIS: Primary | ICD-10-CM

## 2022-05-09 DIAGNOSIS — G47.33 OSA TREATED WITH BIPAP: Chronic | ICD-10-CM

## 2022-05-09 DIAGNOSIS — I50.32 CHRONIC HEART FAILURE WITH PRESERVED EJECTION FRACTION (HCC): Primary | Chronic | ICD-10-CM

## 2022-05-09 DIAGNOSIS — R20.2 NUMBNESS AND TINGLING OF BOTH LOWER EXTREMITIES: ICD-10-CM

## 2022-05-09 DIAGNOSIS — I35.0 AORTIC STENOSIS, MILD: Chronic | ICD-10-CM

## 2022-05-09 DIAGNOSIS — M54.50 LUMBAR PAIN: ICD-10-CM

## 2022-05-09 PROCEDURE — G8417 CALC BMI ABV UP PARAM F/U: HCPCS | Performed by: INTERNAL MEDICINE

## 2022-05-09 PROCEDURE — 3017F COLORECTAL CA SCREEN DOC REV: CPT | Performed by: INTERNAL MEDICINE

## 2022-05-09 PROCEDURE — 99214 OFFICE O/P EST MOD 30 MIN: CPT | Performed by: INTERNAL MEDICINE

## 2022-05-09 PROCEDURE — G8417 CALC BMI ABV UP PARAM F/U: HCPCS

## 2022-05-09 PROCEDURE — 3017F COLORECTAL CA SCREEN DOC REV: CPT

## 2022-05-09 PROCEDURE — 1036F TOBACCO NON-USER: CPT

## 2022-05-09 PROCEDURE — 1036F TOBACCO NON-USER: CPT | Performed by: INTERNAL MEDICINE

## 2022-05-09 PROCEDURE — G8427 DOCREV CUR MEDS BY ELIG CLIN: HCPCS

## 2022-05-09 PROCEDURE — 99213 OFFICE O/P EST LOW 20 MIN: CPT

## 2022-05-09 PROCEDURE — G8427 DOCREV CUR MEDS BY ELIG CLIN: HCPCS | Performed by: INTERNAL MEDICINE

## 2022-05-09 RX ORDER — HYDRALAZINE HYDROCHLORIDE 25 MG/1
25 TABLET, FILM COATED ORAL 3 TIMES DAILY
COMMUNITY
End: 2022-05-09

## 2022-05-09 RX ORDER — HYDRALAZINE HYDROCHLORIDE 50 MG/1
50 TABLET, FILM COATED ORAL 3 TIMES DAILY
Qty: 90 TABLET | Refills: 3 | Status: SHIPPED | OUTPATIENT
Start: 2022-05-09 | End: 2022-10-21 | Stop reason: SDUPTHER

## 2022-05-09 ASSESSMENT — ENCOUNTER SYMPTOMS
NAUSEA: 0
BACK PAIN: 1
SHORTNESS OF BREATH: 0
COLOR CHANGE: 0
COUGH: 0
CONSTIPATION: 0
TROUBLE SWALLOWING: 0

## 2022-05-09 NOTE — PROGRESS NOTES
Neurosurgery Follow up Note    Date:5/9/2022         Patient Name:Holger Aguilar     YOB: 1970     Age:51 y.o. Reason for Follow up:  8 week follow up after patient sees pain management      Chief Complaint:   Chief Complaint   Patient presents with    Follow-up     8 wk follow up        Alfie Ramírez is a 46year old male who presents to the office with his wifeambulating with a cane. Patient presents for a 8 week follow up after patient seen pain management. Keyona Montelongo stated that his back pain is better. He stated that that he has not been wearing his AFO. Patient stated that his numbness and weakness has improved. Patient stated that his pain at its worse is a 5 and on average a 4. Patient stated his pain is coming and going. He stated between that and the injections he is getting from pain he is feeling much better and has more energy. Patient stated that he is performing home exercises that therapy provided him. Patient stated that he is happy with the progress he has made as he is now ambulating with a cane. Patient stated that he is looking forward to further injections from pain management. Patient stated that he finally received his Cpap a month ago   and he is getting better sleep due to this. Patient denies any recent fall or trauma    Review of Systems   Review of Systems   Constitutional: Negative for activity change, appetite change, fatigue and fever. HENT: Negative for trouble swallowing. Eyes: Negative for visual disturbance. Respiratory: Negative for cough and shortness of breath. Cardiovascular: Negative for chest pain. Gastrointestinal: Negative for constipation and nausea. Musculoskeletal: Positive for back pain. Negative for gait problem. Skin: Negative for color change, rash and wound. Neurological: Positive for weakness and numbness. Negative for dizziness. Psychiatric/Behavioral: Negative for confusion.  The patient is not nervous/anxious. Medications     Current Outpatient Medications on File Prior to Visit   Medication Sig Dispense Refill    HYDROcodone-acetaminophen (NORCO) 5-325 MG per tablet Take 1 tablet by mouth every 6 hours as needed for Pain for up to 14 days. Intended supply: 3 days. Take lowest dose possible to manage pain 42 tablet 0    allopurinol (ZYLOPRIM) 100 MG tablet TAKE ONE TABLET BY MOUTH ONCE DAILY      Cholecalciferol (VITAMIN D3) 50 MCG (2000 UT) CAPS TAKE ONE CAPSULE BY MOUTH ONCE DAILY      HYDROcodone-acetaminophen (NORCO) 5-325 MG per tablet TAKE 1 TABLET BY MOUTH EVERY 4-TO-6 HOURS AS NEEDED      GLIPIZIDE XL 5 MG extended release tablet TAKE ONE TABLET BY MOUTH ONCE DAILY      losartan-hydroCHLOROthiazide (HYZAAR) 50-12.5 MG per tablet TAKE ONE TABLET BY MOUTH ONCE DAILY      potassium chloride (KLOR-CON) 10 MEQ extended release tablet TAKE ONE TABLET BY MOUTH ONCE DAILY      pregabalin (LYRICA) 150 MG capsule TAKE ONE CAPSULE BY MOUTH THREE TIMES DAILY      secukinumab (COSENTYX) 150 MG/ML SOSY Inject 150mg subcu weekly for 5 weeks then 4 weeks later start 150mg subcu every 4 weeks. 5 mL 2    pantoprazole (PROTONIX) 40 MG tablet Take 1 tablet by mouth 2 times daily (before meals) 60 tablet 5    pregabalin (LYRICA) 50 MG capsule Take 50 mg by mouth 3 times daily.  clotrimazole-betamethasone (LOTRISONE) 1-0.05 % cream Apply topically 2 times daily.  1 each 0    potassium chloride (KLOR-CON M) 20 MEQ extended release tablet Take 1 tablet by mouth daily 30 tablet 2    apixaban (ELIQUIS) 5 MG TABS tablet Take 10mg by mouth two times daily for 6 days followed by 5mg by mouth two times daily 60 tablet 1    ferrous sulfate (IRON 325) 325 (65 Fe) MG tablet Take 1 tablet by mouth 2 times daily (with meals) 30 tablet 3    albuterol sulfate HFA (PROVENTIL HFA) 108 (90 Base) MCG/ACT inhaler Inhale 2 puffs into the lungs every 6 hours as needed for Wheezing 1 Inhaler 3    vitamin C (ASCORBIC ACID) 500 MG tablet Take 2 tablets by mouth daily 30 tablet 0    CHOLECALCIFEROL PO Take 2,000 Units by mouth daily      atorvastatin (LIPITOR) 40 MG tablet Take 40 mg by mouth nightly      acetaminophen (TYLENOL) 325 MG tablet Take 650 mg by mouth every 4 hours as needed for Pain      busPIRone (BUSPAR) 10 MG tablet Take 10 mg by mouth 2 times daily       allopurinol (ZYLOPRIM) 300 MG tablet Take 1 tablet by mouth daily 90 tablet 0    Multiple Vitamins-Minerals (THERAPEUTIC MULTIVITAMIN-MINERALS) tablet Take 1 tablet by mouth daily      Probiotic Product (SOBIA-BID PROBIOTIC PO) Take 1 tablet by mouth daily       ondansetron (ZOFRAN) 4 MG tablet Take 4 mg by mouth every 8 hours as needed for Nausea or Vomiting      folic acid (FOLVITE) 1 MG tablet Take 1 mg by mouth daily      furosemide (LASIX) 40 MG tablet Take 40 mg by mouth daily       sitaGLIPtan-metformin (JANUMET)  MG per tablet Take 1 tablet by mouth 2 times daily (with meals)       senna (SENOKOT) 8.6 MG tablet Take 1 tablet by mouth 2 times daily      ARIPiprazole (ABILIFY PO) Take 15 mg by mouth daily       Citalopram Hydrobromide (CELEXA PO) Take 30 mg by mouth daily From [employer]       Blood Glucose Monitoring Suppl (FREESTYLE LITE) ARTIE Patient needs all supplies for qd testing. DX: 250.00 1 Device 11     No current facility-administered medications on file prior to visit.         Past History    Past Medical History:   has a past medical history of Aortic stenosis, mild to moderate, BPH (benign prostatic hyperplasia), Carpal tunnel syndrome on right, Chronic gout, COVID-19, DM2 (diabetes mellitus, type 2) ([address] Dyslipidemia, GERD (gastroesophageal reflux disease), Heart failure with preserved ejection fraction ([address] History of alcohol abuse, History of atrial fibrillation, History of osteomyelitis, Hyperlipidemia, Hypertension, essential, Hypogonadism, male, Major depression, Mood disorder ([address] Morbid obesity ([address] DURAN (nonalcoholic steatohepatitis), KIARA treated with BiPAP, and Vitamin D deficiency. Social History:   reports that he has never smoked. He has never used smokeless tobacco. He reports previous alcohol use. He reports that he does not use drugs. Family History:   Family History   Problem Relation Age of Onset    Heart Disease Mother         MI    Cancer Paternal Aunt         lung       Physical Examination      Vitals:  Ht 5' 7.99\" (1.727 m)   Wt 298 lb 15.1 oz (135.6 kg)   BMI 45.46 kg/m²       Physical Exam  Constitutional:       Appearance: Normal appearance. He is obese. He is not ill-appearing. HENT:      Nose: Nose normal.      Mouth/Throat:      Mouth: Mucous membranes are moist.   Eyes:      Pupils: Pupils are equal, round, and reactive to light. Cardiovascular:      Rate and Rhythm: Normal rate. Pulses: Normal pulses. Pulmonary:      Effort: Pulmonary effort is normal.   Abdominal:      General: Bowel sounds are normal.   Musculoskeletal:         General: Tenderness present. Cervical back: Normal range of motion. Lumbar back: Spasms and tenderness present. Back:    Skin:     General: Skin is warm and dry. Findings: No erythema. Neurological:      Mental Status: He is alert and oriented to person, place, and time. Sensory: Sensory deficit present. Motor: Weakness present. Comments: RLE 4/5  LLE 3/5    Absent dorsal flexion on left lower extremity. He states this has been absent since he got off the vent from having covid    Downward babinski on right    Babinski absent on left    Psychiatric:         Mood and Affect: Mood normal.         Behavior: Behavior normal.         Thought Content: Thought content normal.         Judgment: Judgment normal.       Neurologic Exam     Mental Status   Oriented to person, place, and time. Cranial Nerves     CN III, IV, VI   Pupils are equal, round, and reactive to light.        Imaging Imaging last 30 days:  No results found. Assessment and Plan:          1. 8 week follow up after seen pain management  2. Continue conservative treatment  3. Continue to perform home exercises that therapy provided  4. Continue to follow with pain management  5. Continue to wear AFO  6. Fall precautions  7. Continue to ambulate with cane. 8. Discussed with patient and patients wife that he needs to follow with PCP for his elevated BP  9. If steri strips are still on the skin in 7 days you may remove. 10. Follow up prn   11. Call office is symptoms worsen or fail to improve  12. All patient questions answered. Pt voiced understanding.  Patient instructed to call the office with any questions or concerns    Electronically signed by SANDRA Mejia CNP on 5/9/22 at 1:37 PM EDT

## 2022-05-09 NOTE — PROGRESS NOTES
Chief Complaint   Patient presents with    6 Month Follow-Up    Congestive Heart Failure    Check-Up   Originally patient establish cardiologist from [facility]      Pt here for a 6 month f/u    EKG done 3-10-22    Leg edema +1 -chronic - not worse  Sob on exertion chronic    Had back surgery and went well and back pain better    Denied  Chest pain,  dizziness or palpitations  Non wt gain    nevere smoked    FHX  Mother had MI at her 52's      Past- 2012 had cp and abn nuc then and did not want cath that time    Patient Active Problem List   Diagnosis    History of atrial fibrillation    BPH (benign prostatic hyperplasia)    Carpal tunnel syndrome on right    Chronic gout    DM2 (diabetes mellitus, type 2) ([address]    Heart failure with preserved ejection fraction ([address]    History of alcohol abuse    History of osteomyelitis    Hypogonadism, male    Major depression    Morbid obesity ([address]    DURAN (nonalcoholic steatohepatitis)    KIARA treated with BiPAP    Vitamin D deficiency    Normocytic anemia    Physical deconditioning    Mood disorder (HCC)    Hallucinations    GERD (gastroesophageal reflux disease)    Wound of buttock    Primary osteoarthritis of left hip    Acute on chronic anemia    Dysphagia    Hypertension, essential    Dyslipidemia    Aortic stenosis, mild to moderate    Perirectal abscess    Lesion of right lobe of liver    Hepatic steatosis    Elevated alkaline phosphatase level    Anticoagulated    Leukocytosis    Hx of Chest pain, atypical    Lumbar spondylosis    Lumbar facet arthropathy       Past Surgical History:   Procedure Laterality Date    COLONOSCOPY N/A 11/15/2020    COLONOSCOPY DIAGNOSTIC performed by Marni Hylton MD at [facility]    ENDOSCOPY, COLON, DIAGNOSTIC      FOOT SURGERY Right     2018, R foot osteomyelitis    HIP SURGERY Left 6/29/2020    bilateral hip steroid injection, Left HIP FIRST performed by Sanna Zabala MD at [facility] OR    PAIN MANAGEMENT PROCEDURE Bilateral 4/11/2022    bilateral L-facet MBB # 1 @ L4-5 and L5-S1 performed by Ovi Rock MD at [facility] N/A 10/26/2020    SIGMOIDOSCOPY DIAGNOSTIC FLEXIBLE performed by Leslye Gee MD at [facility]      as a baby    UPPER GASTROINTESTINAL ENDOSCOPY N/A 11/14/2020    EGD DIAGNOSTIC ONLY performed by Ahsan Ames MD at [facility] ENDOSCOPY N/A 12/27/2021    EGD performed by Ahsan Ames MD at [address] [facility]       Allergies   Allergen Reactions    Penicillins      Tolerated Zosyn 9/24/2020        Family History   Problem Relation Age of Onset    Heart Disease Mother         MI    Cancer Paternal Aunt         lung        Social History     Socioeconomic History    Marital status:      Spouse name: Not on file    Number of children: Not on file    Years of education: Not on file    Highest education level: Not on file   Occupational History    Not on file   Tobacco Use    Smoking status: Never Smoker    Smokeless tobacco: Never Used   Vaping Use    Vaping Use: Never used   Substance and Sexual Activity    Alcohol use: Not Currently     Comment: hx of abuse    Drug use: No    Sexual activity: Not Currently   Other Topics Concern    Not on file   Social History Narrative    Not on file     Social Determinants of Health     Financial Resource Strain:     Difficulty of Paying Living Expenses: Not on file   Food Insecurity:     Worried About [address] in the Last Year: Not on file    [address] in the Last Year: Not on file   Transportation Needs:     Lack of Transportation (Medical): Not on file    Lack of Transportation (Non-Medical):  Not on file   Physical Activity:     Days of Exercise per Week: Not on file    Minutes of Exercise per Session: Not on file   Stress:     Feeling of Stress : Not on file   Social Connections:     Frequency of Communication with Friends and Family: Not on file    Frequency of Social Gatherings with Friends and Family: Not on file    Attends Buddhism Services: Not on file    Active Member of Clubs or Organizations: Not on file    Attends Club or Organization Meetings: Not on file    Marital Status: Not on file   Intimate Partner Violence:     Fear of Current or Ex-Partner: Not on file    Emotionally Abused: Not on file    Physically Abused: Not on file    Sexually Abused: Not on file   Housing Stability:     Unable to Pay for Housing in the Last Year: Not on file    Number of [place] in the Last Year: Not on file    Unstable Housing in the Last Year: Not on file       Current Outpatient Medications   Medication Sig Dispense Refill    hydrALAZINE (APRESOLINE) 50 MG tablet Take 1 tablet by mouth 3 times daily 90 tablet 3    HYDROcodone-acetaminophen (NORCO) 5-325 MG per tablet Take 1 tablet by mouth every 6 hours as needed for Pain for up to 14 days. Intended supply: 3 days. Take lowest dose possible to manage pain 42 tablet 0    allopurinol (ZYLOPRIM) 100 MG tablet TAKE ONE TABLET BY MOUTH ONCE DAILY      Cholecalciferol (VITAMIN D3) 50 MCG (2000 UT) CAPS TAKE ONE CAPSULE BY MOUTH ONCE DAILY      HYDROcodone-acetaminophen (NORCO) 5-325 MG per tablet TAKE 1 TABLET BY MOUTH EVERY 4-TO-6 HOURS AS NEEDED      GLIPIZIDE XL 5 MG extended release tablet TAKE ONE TABLET BY MOUTH ONCE DAILY      losartan-hydroCHLOROthiazide (HYZAAR) 50-12.5 MG per tablet TAKE ONE TABLET BY MOUTH ONCE DAILY      potassium chloride (KLOR-CON) 10 MEQ extended release tablet TAKE ONE TABLET BY MOUTH ONCE DAILY      pregabalin (LYRICA) 150 MG capsule TAKE ONE CAPSULE BY MOUTH THREE TIMES DAILY      secukinumab (COSENTYX) 150 MG/ML SOSY Inject 150mg subcu weekly for 5 weeks then 4 weeks later start 150mg subcu every 4 weeks.  5 mL 2    pantoprazole (PROTONIX) 40 MG tablet Take 1 tablet by mouth 2 times daily (before meals) 60 tablet 5    pregabalin (LYRICA) 50 MG capsule Take 50 mg by mouth 3 times daily.  clotrimazole-betamethasone (LOTRISONE) 1-0.05 % cream Apply topically 2 times daily.  1 each 0    potassium chloride (KLOR-CON M) 20 MEQ extended release tablet Take 1 tablet by mouth daily 30 tablet 2    apixaban (ELIQUIS) 5 MG TABS tablet Take 10mg by mouth two times daily for 6 days followed by 5mg by mouth two times daily 60 tablet 1    ferrous sulfate (IRON 325) 325 (65 Fe) MG tablet Take 1 tablet by mouth 2 times daily (with meals) 30 tablet 3    albuterol sulfate HFA (PROVENTIL HFA) 108 (90 Base) MCG/ACT inhaler Inhale 2 puffs into the lungs every 6 hours as needed for Wheezing 1 Inhaler 3    vitamin C (ASCORBIC ACID) 500 MG tablet Take 2 tablets by mouth daily 30 tablet 0    CHOLECALCIFEROL PO Take 2,000 Units by mouth daily      atorvastatin (LIPITOR) 40 MG tablet Take 40 mg by mouth nightly      acetaminophen (TYLENOL) 325 MG tablet Take 650 mg by mouth every 4 hours as needed for Pain      busPIRone (BUSPAR) 10 MG tablet Take 10 mg by mouth 2 times daily       allopurinol (ZYLOPRIM) 300 MG tablet Take 1 tablet by mouth daily 90 tablet 0    Multiple Vitamins-Minerals (THERAPEUTIC MULTIVITAMIN-MINERALS) tablet Take 1 tablet by mouth daily      Probiotic Product (SOBIA-BID PROBIOTIC PO) Take 1 tablet by mouth daily       ondansetron (ZOFRAN) 4 MG tablet Take 4 mg by mouth every 8 hours as needed for Nausea or Vomiting      folic acid (FOLVITE) 1 MG tablet Take 1 mg by mouth daily      furosemide (LASIX) 40 MG tablet Take 40 mg by mouth daily       sitaGLIPtan-metformin (JANUMET)  MG per tablet Take 1 tablet by mouth 2 times daily (with meals)       senna (SENOKOT) 8.6 MG tablet Take 1 tablet by mouth 2 times daily      ARIPiprazole (ABILIFY PO) Take 15 mg by mouth daily       Citalopram Hydrobromide (CELEXA PO) Take 30 mg by mouth daily From [employer] services       Blood Glucose Monitoring Suppl (FREESTYLE LITE) ARTIE Patient needs all supplies for qd testing. DX: 250.00 1 Device 11     No current facility-administered medications for this visit. Review of Systems -     General ROS: negative  Psychological ROS: negative  Hematological and Lymphatic ROS: No history of blood clots or bleeding disorder. Respiratory ROS: no cough,  or wheezing, the rest see HPI  Cardiovascular ROS: See HPI  Gastrointestinal ROS: negative  Genito-Urinary ROS: no dysuria, trouble voiding, or hematuria  Musculoskeletal ROS: negative  Neurological ROS: no TIA or stroke symptoms  Dermatological ROS: negative      Blood pressure (!) 176/108, pulse 76, height 5' 8\" (1.727 m), weight 292 lb 12.8 oz (132.8 kg). Physical Examination:    General appearance - alert, well appearing, and in no distress  HEENT- Pink conjunctiva  , Non-icteri sclera,PERRLA  Mental status - alert, oriented to person, place, and time  Neck - supple, no significant adenopathy, no JVD, or carotid bruits  Chest - clear to auscultation, no wheezes, rales or rhonchi, symmetric air entry  Heart - normal rate, regular rhythm, normal S1, S2, no murmurs, rubs, clicks or gallops  Abdomen - soft, nontender, nondistended, no masses or organomegaly  JORDAN- no CVA or flank tenderness, no suprapubic tenderness  Neurological - alert, oriented, normal speech, no focal findings or movement disorder noted  Musculoskeletal/limbs - no joint tenderness, deformity or swelling   - peripheral pulses normal, no pedal edema, no clubbing or cyanosis  Skin - normal coloration and turgor, no rashes, no suspicious skin lesions noted  Psych- appropriate mood and affect    Lab  No results for input(s): CKTOTAL, CKMB, CKMBINDEX, TROPONINI in the last 72 hours.   CBC:   Lab Results   Component Value Date    WBC 9.6 03/10/2022    RBC 5.07 03/10/2022    RBC 4.55 04/15/2012    HGB 15.4 03/10/2022    HCT 46.0 03/10/2022    MCV 90.7 03/10/2022 MCH 30.4 03/10/2022    MCHC 33.5 03/10/2022    RDW 19.9 04/06/2020     03/10/2022    MPV 11.5 03/10/2022     BMP:    Lab Results   Component Value Date     03/10/2022    K 4.2 03/10/2022     03/10/2022    CO2 25 03/10/2022    BUN 22 03/10/2022    LABALBU 4.0 03/04/2022    LABALBU 4.4 01/26/2012    CREATININE 1.4 03/10/2022    CALCIUM 9.5 03/10/2022    GFRAA >60 01/23/2019    LABGLOM 64 03/10/2022    GLUCOSE 130 03/10/2022    GLUCOSE 113 06/24/2018     Hepatic Function Panel:    Lab Results   Component Value Date    ALKPHOS 159 03/04/2022    ALT 46 03/04/2022    AST 67 03/04/2022    PROT 7.2 03/04/2022    BILITOT 0.6 03/04/2022    BILIDIR <0.2 03/04/2022    LABALBU 4.0 03/04/2022    LABALBU 4.4 01/26/2012     Magnesium:    Lab Results   Component Value Date    MG 1.4 11/19/2020     Warfarin PT/INR:  No components found for: PTPATWAR, PTINRWAR  HgBA1c:    Lab Results   Component Value Date    LABA1C 7.3 09/06/2021     FLP:  No results found for: TRIG, HDL, LDLCALC, LDLDIRECT, LABVLDL  TSH:  No results found for: TSH  Conclusions      Summary   Ejection fraction is visually estimated at 60%. Overall left ventricular function is normal.   The aortic valve leaflets were not well visualized. Aortic valve appears tricuspid. Aortic valve leaflets are somewhat thickened. Aortic valve leaflets are Mildly calcified. The maximum aortic valve gradient is 30 mmHg, the mean gradient is 15   mmHg, and the peak velocity is 275 cm/s. There is mild-to-moderate aortic stenosis with valve area of 1.5 sq cm. Signature      ----------------------------------------------------------------   Electronically signed by Suni SongInterpreting   physician) on 09/24/2020 at 04:36 PM   ----------------------------------------------------------------           Conclusions      Summary   Ejection fraction is visually estimated at 55%.    Overall left ventricular function is normal.      Signature ----------------------------------------------------------------   Electronically signed by Benedetta Severs MD (Interpreting   physician) on 09/07/2021 at 03:43 PM   ----------------------------------------------------------------    Conclusions      Summary   This Nuclear Medicine study was negative for ischemia . difficult scan due to patient body habitus   normal EF      Recommendation   Medical management. Signatures      ----------------------------------------------------------------   Electronically signed by Benedetta Severs MD (Interpreting   Cardiologist) on 09/07/2021 at 16:38   ----------------------------------------------------------------           EKG 9/30/19  Sinus  Rhythm   Low voltage in precordial leads.    -  Nonspecific T-abnormality. ABNORMAL   Normal sinus rhythm  Low voltage QRS, consider pulmonary disease, pericardial effusion, or normal variant  Nonspecific T wave abnormality  Abnormal ECG  When compared with ECG of 09-NOV-2011 22:01,  No significant change was found  Confirmed by Keely Thomas MD, Sergio Cevallos ([id number]) on 5/20/2020 8:02:47    topronin neg x2    ekg 3/10/22  Normal sinus rhythm Low voltage QRS, consider pulmonary disease, pericardial effusion, or normal variant Borderline ECG When compared with ECG of 10-MAR-2022 19:59, No significant change was found    Assessment       Diagnosis Orders   1. Chronic heart failure with preserved ejection fraction ([address]     2. Hypertension, essential     3. History of atrial fibrillation     4. Aortic stenosis, mild to moderate     5. KIARA treated with BiPAP           Plan     The most current meds and labs reviewed    Continue the current treatment and with constant vigilance to changes in symptoms and also any potential side effects. Return for care or seek medical attention immediately if symptoms got worse and/or develop new symptoms. Hypertension, on medical treatment. Seems to be under poor control.  Patient is compliant with medical treatment. Now on losartan hctz 50/12.5 po qd  Increase hydralazine 50 po tid for BP control from 25 tid    Echo and nuc stress- WNL sept 2021  Asa 81 po qd  Limited walking capability  No cp free  Advised to get to ER if any recurrence of cp    Congestive heart failure: no evidence of fluid overload today, no recent hospitalization for CHF  Leg edema +1 stable  On lasix 40 po qd  Cont  kcl 20 meq po qd    Advised to lose wt     Hyperlipidemia: on statins, followed periodically. Patient need periodic lipid and liver profile. Hx of atr fib - no ekg found But documented on note from outside  Cont apixaban    Document reviewed from outside  Reported CHF and atr fib and meds   Lasix and apixaban for it  Need to get ekg with atr fib    D/w the pat plan of care    Overall from cardiac stand Stable and doing well    Discussed use, benefit, and side effects of prescribed medications. All patient questions answered. Pt voiced understanding. Instructed to continue current medications, diet and exercise. Continue risk factor modification and medical management. Patient agreed with treatment plan. Follow up as directed.       RTC in 3   months        Giovanna Lyle [facility]

## 2022-05-19 ENCOUNTER — PREP FOR PROCEDURE (OUTPATIENT)
Dept: PHYSICAL MEDICINE AND REHAB | Age: 52
End: 2022-05-19

## 2022-05-23 ENCOUNTER — ANESTHESIA (OUTPATIENT)
Dept: OPERATING ROOM | Age: 52
End: 2022-05-23
Payer: MEDICARE

## 2022-05-23 ENCOUNTER — HOSPITAL ENCOUNTER (OUTPATIENT)
Age: 52
Setting detail: OUTPATIENT SURGERY
Discharge: HOME | End: 2022-05-23
Attending: PAIN MEDICINE | Admitting: PAIN MEDICINE
Payer: MEDICARE

## 2022-05-23 ENCOUNTER — APPOINTMENT (OUTPATIENT)
Dept: GENERAL RADIOLOGY | Age: 52
End: 2022-05-23
Attending: PAIN MEDICINE
Payer: MEDICARE

## 2022-05-23 ENCOUNTER — ANESTHESIA EVENT (OUTPATIENT)
Dept: OPERATING ROOM | Age: 52
End: 2022-05-23
Payer: MEDICARE

## 2022-05-23 VITALS
HEART RATE: 64 BPM | BODY MASS INDEX: 44.31 KG/M2 | HEIGHT: 68 IN | WEIGHT: 292.4 LBS | DIASTOLIC BLOOD PRESSURE: 89 MMHG | OXYGEN SATURATION: 96 % | TEMPERATURE: 96.7 F | RESPIRATION RATE: 16 BRPM | SYSTOLIC BLOOD PRESSURE: 154 MMHG

## 2022-05-23 LAB — GLUCOSE BLD-MCNC: 220 MG/DL (ref 70–108)

## 2022-05-23 PROCEDURE — 7100000011 HC PHASE II RECOVERY - ADDTL 15 MIN: Performed by: PAIN MEDICINE

## 2022-05-23 PROCEDURE — 2500000003 HC RX 250 WO HCPCS: Performed by: PAIN MEDICINE

## 2022-05-23 PROCEDURE — 3600000054 HC PAIN LEVEL 3 BASE: Performed by: PAIN MEDICINE

## 2022-05-23 PROCEDURE — 82948 REAGENT STRIP/BLOOD GLUCOSE: CPT

## 2022-05-23 PROCEDURE — 7100000010 HC PHASE II RECOVERY - FIRST 15 MIN: Performed by: PAIN MEDICINE

## 2022-05-23 PROCEDURE — 6360000002 HC RX W HCPCS: Performed by: NURSE ANESTHETIST, CERTIFIED REGISTERED

## 2022-05-23 PROCEDURE — 2580000003 HC RX 258: Performed by: PAIN MEDICINE

## 2022-05-23 PROCEDURE — 6360000004 HC RX CONTRAST MEDICATION: Performed by: PAIN MEDICINE

## 2022-05-23 PROCEDURE — A4216 STERILE WATER/SALINE, 10 ML: HCPCS | Performed by: PAIN MEDICINE

## 2022-05-23 PROCEDURE — 62323 NJX INTERLAMINAR LMBR/SAC: CPT | Performed by: PAIN MEDICINE

## 2022-05-23 PROCEDURE — 6360000002 HC RX W HCPCS: Performed by: PAIN MEDICINE

## 2022-05-23 PROCEDURE — 2709999900 HC NON-CHARGEABLE SUPPLY: Performed by: PAIN MEDICINE

## 2022-05-23 PROCEDURE — 3209999900 FLUORO FOR SURGICAL PROCEDURES

## 2022-05-23 PROCEDURE — 3700000000 HC ANESTHESIA ATTENDED CARE: Performed by: PAIN MEDICINE

## 2022-05-23 PROCEDURE — 2500000003 HC RX 250 WO HCPCS: Performed by: NURSE ANESTHETIST, CERTIFIED REGISTERED

## 2022-05-23 RX ORDER — DEXAMETHASONE SODIUM PHOSPHATE 4 MG/ML
INJECTION, SOLUTION INTRA-ARTICULAR; INTRALESIONAL; INTRAMUSCULAR; INTRAVENOUS; SOFT TISSUE PRN
Status: DISCONTINUED | OUTPATIENT
Start: 2022-05-23 | End: 2022-05-23 | Stop reason: ALTCHOICE

## 2022-05-23 RX ORDER — LIDOCAINE HYDROCHLORIDE 10 MG/ML
INJECTION, SOLUTION INFILTRATION; PERINEURAL PRN
Status: DISCONTINUED | OUTPATIENT
Start: 2022-05-23 | End: 2022-05-23 | Stop reason: ALTCHOICE

## 2022-05-23 RX ORDER — LIDOCAINE HYDROCHLORIDE 20 MG/ML
INJECTION, SOLUTION EPIDURAL; INFILTRATION; INTRACAUDAL; PERINEURAL PRN
Status: DISCONTINUED | OUTPATIENT
Start: 2022-05-23 | End: 2022-05-23 | Stop reason: SDUPTHER

## 2022-05-23 RX ORDER — SODIUM CHLORIDE 9 MG/ML
INJECTION INTRAVENOUS PRN
Status: DISCONTINUED | OUTPATIENT
Start: 2022-05-23 | End: 2022-05-23 | Stop reason: ALTCHOICE

## 2022-05-23 RX ORDER — PROPOFOL 10 MG/ML
INJECTION, EMULSION INTRAVENOUS PRN
Status: DISCONTINUED | OUTPATIENT
Start: 2022-05-23 | End: 2022-05-23 | Stop reason: SDUPTHER

## 2022-05-23 RX ADMIN — LIDOCAINE HYDROCHLORIDE 40 MG: 20 INJECTION, SOLUTION EPIDURAL; INFILTRATION; INTRACAUDAL; PERINEURAL at 10:59

## 2022-05-23 RX ADMIN — PROPOFOL 60 MG: 10 INJECTION, EMULSION INTRAVENOUS at 10:59

## 2022-05-23 ASSESSMENT — PAIN - FUNCTIONAL ASSESSMENT: PAIN_FUNCTIONAL_ASSESSMENT: NONE - DENIES PAIN

## 2022-05-23 NOTE — OP NOTE
Pre-Procedure Note    Patient Name: Haydee Smith   YOB: 1970  Medical Record Number: [id number]  Date: 5/23/22       Indication: L-spinal stenosis  Consent: On file. Vital Signs:   Vitals:    05/23/22 1024   BP: (!) 179/103   Pulse:    Resp:    Temp:    SpO2:        Past Medical History:   has a past medical history of Aortic stenosis, mild to moderate, BPH (benign prostatic hyperplasia), Carpal tunnel syndrome on right, Chronic gout, COVID-19, DM2 (diabetes mellitus, type 2) ([address] Dyslipidemia, GERD (gastroesophageal reflux disease), Heart failure with preserved ejection fraction ([address] History of alcohol abuse, History of atrial fibrillation, History of osteomyelitis, Hyperlipidemia, Hypertension, essential, Hypogonadism, male, Major depression, Mood disorder ([address] Morbid obesity ([facility] [address] DURAN (nonalcoholic steatohepatitis), KIARA treated with BiPAP, and Vitamin D deficiency. Past Surgical History:   has a past surgical history that includes tracheostomy; Foot surgery (Right); hip surgery (Left, 6/29/2020); sigmoidoscopy (N/A, 10/26/2020); Upper gastrointestinal endoscopy (N/A, 11/14/2020); Colonoscopy (N/A, 11/15/2020); Tonsillectomy; Upper gastrointestinal endoscopy (N/A, 12/27/2021); Endoscopy, colon, diagnostic; and Pain management procedure (Bilateral, 4/11/2022). Pre-Sedation Documentation and Exam:   Vital signs have been reviewed (see flow sheet for vitals). Sedation/Anesthesia Plan:   MAC    Medications Planned:   Per Anesthesia. Patient is an appropriate candidate for plan of sedation: yes    Preoperative Diagnosis:  L-radiculitis, L-spinal stenosis    Post-Op Dx: as above    Procedure Performed:  Lumbar epidural steroid injection under fluoroscopy guidance     Indication for the Procedure: The patient failed conservative management  for pain in the low back radiating to lower extremities. The patient is undergoing lumbar epidural steroid injection. .  As the patient is not responding to conservative management and it is interfering with activities of daily living we decided to proceed with lumbar epidural steroid injection. The procedure and risks were discussed with the patient and an informed consent was obtained    Procedure: Attempted L3 to narrow    The patient is placed in prone position. Skin over the back was prepped and draped in sterile manner. Then using fluoroscopy the L5 interspace was observed and the skin and deep tissues in the  paramedian area were infiltrated with 10 ml of 1% lidocaine. The #20-gauge, 3-1/2 inch Tuohy needle was inserted through the skin wheal and the epidural space was identified using loss of resistance technique . This was confirmed with AP and lateral views using fluoroscopy after injecting about 2 ml of Omnipaque-180 and observing the spread of the contrast in the epidural space. Then after negative aspiration a total of 12 mg of dexamethasone with 4 ml of normal saline was injected into the epidural space. The needle is removed and a Band-Aid was placed over the needle insertion site. No paresthesia. EBL-0  Patient's vital signs remained stable and the patient tolerated the procedure well. The patient will be discharged home in stable condition and will be followed in the office in the next few weeks for further planning.     Electronically signed by Rj Arita MD on 5/23/22 at 10:58 AM EDT

## 2022-05-23 NOTE — ANESTHESIA PRE PROCEDURE
Department of Anesthesiology  Preprocedure Note       Name:  Leonie Montiel   Age:  46 y.o.  :  1970                                          MRN:  [id number]         Date:  2022      Surgeon: Mirella Bright):  Reyes Haynes MD    Procedure: Procedure(s):  NELSY # 1 @ L4-5. Medications prior to admission:   Prior to Admission medications    Medication Sig Start Date End Date Taking? Authorizing Provider   hydrALAZINE (APRESOLINE) 50 MG tablet Take 1 tablet by mouth 3 times daily 22   Neil Chang MD   allopurinol (ZYLOPRIM) 100 MG tablet TAKE ONE TABLET BY MOUTH ONCE DAILY 22   Historical Provider, MD   Cholecalciferol (VITAMIN D3) 50 MCG (2000) CAPS TAKE ONE CAPSULE BY MOUTH ONCE DAILY 22   Historical Provider, MD   HYDROcodone-acetaminophen (Sammy Carne) 5-325 MG per tablet TAKE 1 TABLET BY MOUTH EVERY 4-TO-6 HOURS AS NEEDED 3/25/22   Historical Provider, MD   GLIPIZIDE XL 5 MG extended release tablet TAKE ONE TABLET BY MOUTH ONCE DAILY 22   Historical Provider, MD   losartan-hydroCHLOROthiazide (HYZAAR) 50-12.5 MG per tablet TAKE ONE TABLET BY MOUTH ONCE DAILY 22   Historical Provider, MD   potassium chloride (KLOR-CON) 10 MEQ extended release tablet TAKE ONE TABLET BY MOUTH ONCE DAILY 22   Historical Provider, MD   pregabalin (LYRICA) 150 MG capsule TAKE ONE CAPSULE BY MOUTH THREE TIMES DAILY 3/1/22   Historical Provider, MD   secukinumab (COSENTYX) 150 MG/ML SOSY Inject 150mg subcu weekly for 5 weeks then 4 weeks later start 150mg subcu every 4 weeks. 3/29/22   Nathaniel Linton DO   pantoprazole (PROTONIX) 40 MG tablet Take 1 tablet by mouth 2 times daily (before meals) 21   SANDRA Ramirez - CNP   pregabalin (LYRICA) 50 MG capsule Take 50 mg by mouth 3 times daily. Historical Provider, MD   clotrimazole-betamethasone (LOTRISONE) 1-0.05 % cream Apply topically 2 times daily.  21   Noralee Merlin, MD   potassium chloride (KLOR-CON M) 20 MEQ extended release tablet Take 1 tablet by mouth daily 12/15/20   Mark Ca MD   apixaban (ELIQUIS) 5 MG TABS tablet Take 10mg by mouth two times daily for 6 days followed by 5mg by mouth two times daily 11/19/20   Diaz Abraham MD   ferrous sulfate (IRON 325) 325 (65 Fe) MG tablet Take 1 tablet by mouth 2 times daily (with meals) 11/6/20   Ayo Sanchez MD   albuterol sulfate HFA (PROVENTIL HFA) 108 (90 Base) MCG/ACT inhaler Inhale 2 puffs into the lungs every 6 hours as needed for Wheezing 11/6/20   Ayo Sanchez MD   vitamin C (ASCORBIC ACID) 500 MG tablet Take 2 tablets by mouth daily 9/15/20   SANDRA Rust CNP   CHOLECALCIFEROL PO Take 2,000 Units by mouth daily    Historical Provider, MD   atorvastatin (LIPITOR) 40 MG tablet Take 40 mg by mouth nightly    Historical Provider, MD   acetaminophen (TYLENOL) 325 MG tablet Take 650 mg by mouth every 4 hours as needed for Pain    Historical Provider, MD   busPIRone (BUSPAR) 10 MG tablet Take 10 mg by mouth 2 times daily     Historical Provider, MD   allopurinol (ZYLOPRIM) 300 MG tablet Take 1 tablet by mouth daily 4/9/20   Angelica Serrano DO   Multiple Vitamins-Minerals (THERAPEUTIC MULTIVITAMIN-MINERALS) tablet Take 1 tablet by mouth daily    Historical Provider, MD   Probiotic Product (SOBIA-BID PROBIOTIC PO) Take 1 tablet by mouth daily     Historical Provider, MD   ondansetron (ZOFRAN) 4 MG tablet Take 4 mg by mouth every 8 hours as needed for Nausea or Vomiting    Historical Provider, MD   folic acid (FOLVITE) 1 MG tablet Take 1 mg by mouth daily    Historical Provider, MD   furosemide (LASIX) 40 MG tablet Take 40 mg by mouth daily     Historical Provider, MD   sitaGLIPtan-metformin (JANUMET)  MG per tablet Take 1 tablet by mouth 2 times daily (with meals)     Historical Provider, MD   senna (SENOKOT) 8.6 MG tablet Take 1 tablet by mouth 2 times daily    Historical Provider, MD   ARIPiprazole (ABILIFY PO) Take 15 mg by mouth daily     Historical Provider, MD   Citalopram Hydrobromide (CELEXA PO) Take 30 mg by mouth daily From [employer]     Historical Provider, MD   Blood Glucose Monitoring Suppl (FREESTYLE LITE) ARTIE Patient needs all supplies for qd testing. DX: 250.00 8/18/11   Millicent Orantes MD       Current medications:    No current facility-administered medications for this encounter. Allergies: Allergies   Allergen Reactions    Penicillins      Tolerated Zosyn 9/24/2020       Problem List:    Patient Active Problem List   Diagnosis Code    History of atrial fibrillation Z86.79    BPH (benign prostatic hyperplasia) N40.0    Carpal tunnel syndrome on right G56.01    Chronic gout M1A. 9XX0    DM2 (diabetes mellitus, type 2) ([facility] [address] E11.9    Heart failure with preserved ejection fraction (HCC) I50.30    History of alcohol abuse F10.11    History of osteomyelitis Z87.39    Hypogonadism, male E29.1    Major depression F32.9    Morbid obesity (HCC) E66.01    DURAN (nonalcoholic steatohepatitis) K75.81    KIARA treated with BiPAP G47.33    Vitamin D deficiency E55.9    Normocytic anemia D64.9    Physical deconditioning R53.81    Mood disorder (HCC) F39    Hallucinations R44.3    GERD (gastroesophageal reflux disease) K21.9    Wound of buttock S31.809A    Primary osteoarthritis of left hip M16.12    Acute on chronic anemia D64.9    Dysphagia R13.10    Hypertension, essential I10    Dyslipidemia E78.5    Aortic stenosis, mild to moderate I35.0    Perirectal abscess K61.1    Lesion of right lobe of liver K76.9    Hepatic steatosis K76.0    Elevated alkaline phosphatase level R74.8    Anticoagulated Z79.01    Leukocytosis D72.829    Hx of Chest pain, atypical R07.89    Lumbar spondylosis M47.816    Lumbar facet arthropathy M47.816       Past Medical History:        Diagnosis Date    Aortic stenosis, mild to moderate     BPH (benign prostatic hyperplasia)     Carpal tunnel syndrome on right     rt hand    Chronic gout     COVID-19 09/2020    DM2 (diabetes mellitus, type 2) (HCC)     Dyslipidemia     GERD (gastroesophageal reflux disease)     Heart failure with preserved ejection fraction (HCC)     History of alcohol abuse     History of atrial fibrillation     History of osteomyelitis     Hx of R foot wound, osteomyelitis July 2018 requiring surgery and IV Vanco    Hyperlipidemia     Hypertension, essential     Hypogonadism, male     Major depression     Mood disorder ([facility] [address]     Morbid obesity ([facility] [address]     DURAN (nonalcoholic steatohepatitis)     KIARA treated with BiPAP     Vitamin D deficiency        Past Surgical History:        Procedure Laterality Date    COLONOSCOPY N/A 11/15/2020    COLONOSCOPY DIAGNOSTIC performed by Ritu Ramirez MD at [address] [facility]    ENDOSCOPY, COLON, DIAGNOSTIC      FOOT SURGERY Right     2018, R foot osteomyelitis    HIP SURGERY Left 6/29/2020    bilateral hip steroid injection, Left HIP FIRST performed by Anisha Coker MD at [facility] [address] Bilateral 4/11/2022    bilateral L-facet MBB # 1 @ L4-5 and L5-S1 performed by Anisha Coker MD at [address] [facility] N/A 10/26/2020    [address] [facility] performed by Yaron Rodriguez MD at [address] [facility]      as a baby    UPPER GASTROINTESTINAL ENDOSCOPY N/A 11/14/2020    EGD DIAGNOSTIC ONLY performed by Ritu Ramirez MD at [facility] N/A 12/27/2021    EGD performed by Ritu Ramirez MD at [address] [facility]       Social History:    Social History     Tobacco Use    Smoking status: Never Smoker    Smokeless tobacco: Never Used   Substance Use Topics    Alcohol use: Not Currently     Comment: hx of abuse                                Counseling given: Not Answered      Vital Signs (Current):   Vitals:    05/23/22 1018 05/23/22 1024   BP: (!) 187/102 (!) 179/103   Pulse: 69    Resp: 16    Temp: 97 °F (36.1 °C)    TempSrc: Temporal    SpO2: 94%    Weight: 292 lb 6.4 oz (132.6 kg)    Height: 5' 8\" (1.727 m)                                               BP Readings from Last 3 Encounters:   05/23/22 (!) 179/103   05/09/22 (!) 176/108   05/09/22 (!) 180/82       NPO Status: Time of last liquid consumption: 2000                        Time of last solid consumption: 2000                        Date of last liquid consumption: 05/22/22                        Date of last solid food consumption: 05/22/22    BMI:   Wt Readings from Last 3 Encounters:   05/23/22 292 lb 6.4 oz (132.6 kg)   05/09/22 292 lb 12.8 oz (132.8 kg)   05/09/22 298 lb 15.1 oz (135.6 kg)     Body mass index is 44.46 kg/m².     CBC:   Lab Results   Component Value Date    WBC 9.6 03/10/2022    RBC 5.07 03/10/2022    RBC 4.55 04/15/2012    HGB 15.4 03/10/2022    HCT 46.0 03/10/2022    MCV 90.7 03/10/2022    RDW 19.9 04/06/2020     03/10/2022       CMP:   Lab Results   Component Value Date     03/10/2022    K 4.2 03/10/2022     03/10/2022    CO2 25 03/10/2022    BUN 22 03/10/2022    CREATININE 1.4 03/10/2022    GFRAA >60 01/23/2019    AGRATIO 0.8 06/24/2018    LABGLOM 64 03/10/2022    GLUCOSE 130 03/10/2022    GLUCOSE 113 06/24/2018    PROT 7.2 03/04/2022    CALCIUM 9.5 03/10/2022    BILITOT 0.6 03/04/2022    ALKPHOS 159 03/04/2022    AST 67 03/04/2022    ALT 46 03/04/2022       POC Tests:   Recent Labs     05/23/22  1013   POCGLU 220*       Coags:   Lab Results   Component Value Date    PROTIME 24.4 06/27/2018    INR 1.14 11/07/2020    APTT 162.5 11/18/2020       HCG (If Applicable): No results found for: PREGTESTUR, PREGSERUM, HCG, HCGQUANT     ABGs: No results found for: PHART, PO2ART, NLE2UVT, FXH3HKH, BEART, U4SSRSSD     Type & Screen (If Applicable):  Lab Results   Component Value Date    LABRH POS 11/07/2020       Drug/Infectious Status (If Applicable):  Lab Results   Component Value Date    HEPCAB Negative 10/15/2019       COVID-19 Screening (If Applicable):   Lab Results   Component Value Date    COVID19 NOT DETECTED 09/06/2021    COVID19 NOT DETECTED 11/05/2020           Anesthesia Evaluation  Patient summary reviewed and Nursing notes reviewed no history of anesthetic complications:   Airway: Mallampati: III  TM distance: >3 FB   Neck ROM: full  Mouth opening: > = 3 FB   Dental:          Pulmonary:normal exam  breath sounds clear to auscultation  (+) sleep apnea: on CPAP,                             Cardiovascular:  Exercise tolerance: good (>4 METS),   (+) hypertension:, valvular problems/murmurs: AS, murmur: Grade 2, Aortic,       ECG reviewed  Rhythm: regular  Rate: normal                    Neuro/Psych:   (+) neuromuscular disease:, psychiatric history:            GI/Hepatic/Renal:   (+) GERD:, liver disease:, morbid obesity          Endo/Other:    (+) DiabetesType II DM, poorly controlled, , .          Pt had no PAT visit       Abdominal:   (+) obese,     Abdomen: soft. Vascular: negative vascular ROS. Other Findings:           Anesthesia Plan      MAC     ASA 3       Induction: intravenous. Anesthetic plan and risks discussed with patient. Plan discussed with CRNA.                 [address], DO   5/23/2022

## 2022-05-23 NOTE — H&P
H&P    Bilateral L-facet MBB # 1 from 4/11/2022. Reports patient received that he received 80% relief of pain in low back initially and continues to receive 75% relief at this time. Low back pain is tolerable at this time and is better. Still has pain in low back aching and like a twitch. Main complaint is pain down bilateral legs foot to calf- sharp and tingling, numbing and throbbing   Gets muscle spasm in right leg      Pain is now 90% bothersome in legs   Out of pain medications as pcp wont prescribe Norco but they continue to prescribe Lyrica  Pain increases with bending, lifting, twisting , walking, standing and getting up and down.     States he is currently taking tylenol # 3 prescribe by his rheumatologist Dr. Yulia Morales but they don't help as much                    Medications reviewed. Patient denies side effects with medications. Patient states he is taking medications as prescribed. Hedenies receiving pain medications from other sources. He denies any ER visits since last visit.     Pain scale with out pain medications or at its worst is 8/10. Pain scale with pain medications or at its best is 6/10. Last dose of Tylenol 3 was today   Drug screen reviewed from 3/3/2022 and was appropriate           The patientis allergic to penicillins.        Subjective:      Review of Systems   Constitutional: Positive for activity change and fatigue. Negative for appetite change, chills, diaphoresis, fever and unexpected weight change. HENT: Negative. Eyes: Negative. Respiratory: Negative. Cardiovascular: Positive for leg swelling. Negative for chest pain. Gastrointestinal: Negative. Genitourinary: Negative. Musculoskeletal: Positive for arthralgias, back pain, gait problem, joint swelling and myalgias. Negative for neck pain and neck stiffness. Skin: Negative. Neurological: Positive for weakness and numbness.    Psychiatric/Behavioral: Negative.          Objective:      Vitals       Vitals:     04/28/22 0926   BP: 130/70   Weight: 299 lb (135.6 kg)   Height: 5' 8\" (1.727 m)            Physical Exam  Constitutional:       Appearance: Normal appearance. HENT:      Head: Normocephalic and atraumatic. Right Ear: External ear normal.      Left Ear: External ear normal.      Nose: Nose normal.      Mouth/Throat:      Mouth: Mucous membranes are moist.      Pharynx: Oropharynx is clear. Eyes:      General:         Right eye: No discharge. Extraocular Movements: Extraocular movements intact. Conjunctiva/sclera: Conjunctivae normal.      Pupils: Pupils are equal, round, and reactive to light. Cardiovascular:      Rate and Rhythm: Normal rate and regular rhythm. Pulses: Decreased pulses. Pulmonary:      Effort: Pulmonary effort is normal.      Breath sounds: Normal breath sounds. Abdominal:      General: Abdomen is flat. Bowel sounds are normal.   Musculoskeletal:         General: Tenderness present. Right shoulder: Decreased range of motion. Left shoulder: Decreased range of motion. Right wrist: Decreased range of motion. Right hand: Decreased range of motion. Cervical back: Normal range of motion and neck supple. Tenderness and bony tenderness present. Lumbar back: Tenderness and bony tenderness present. Decreased range of motion. Positive right straight leg raise test and positive left straight leg raise test.        Back:       Right hip: Tenderness and bony tenderness present. Decreased range of motion. Decreased strength. Left hip: Tenderness and bony tenderness present. Decreased range of motion. Decreased strength. Right upper leg: Tenderness and bony tenderness present. Right knee: Swelling present. Decreased range of motion. Tenderness present. Left knee: Swelling present. Decreased range of motion. Tenderness present. Right lower leg: Swelling, tenderness and bony tenderness present. 2+ Pitting Edema present.       Left lower leg: Swelling, tenderness and bony tenderness present. 2+ Pitting Edema present. Right ankle: Swelling present. Tenderness present. Decreased range of motion. Left ankle: Swelling present. Tenderness present. Decreased range of motion. Left foot: Decreased range of motion. Tenderness present. Skin:     General: Skin is warm and dry. Neurological:      General: No focal deficit present. Mental Status: He is alert and oriented to person, place, and time. Sensory: Sensory deficit present. Motor: Weakness present. Coordination: Coordination abnormal.      Gait: Gait abnormal.      Deep Tendon Reflexes:      Reflex Scores:       Tricep reflexes are 2+ on the right side and 2+ on the left side. Bicep reflexes are 2+ on the right side and 2+ on the left side. Brachioradialis reflexes are 2+ on the right side and 2+ on the left side. Patellar reflexes are 1+ on the right side and 1+ on the left side. Achilles reflexes are 1+ on the right side and 1+ on the left side. Comments: Strength 4/5 bilateral lower extremities     Numbness and decreased sensation bilateral feet. Psychiatric:         Mood and Affect: Mood normal.            BIGG  Patricks test  positive  Yeoman's  or Gaenslen's positive  Assessment:      1. Lumbosacral radiculitis    2. Spinal stenosis of lumbar region with neurogenic claudication    3. Lumbar spondylosis    4. Lumbar facet arthropathy    5. Arthralgia, unspecified joint    6. Chronic pain syndrome       Plan:      · OARRS reviewed. Current MED: 30.00  · Patient was offered naloxone for home. · Discussed long term side effects of medications, tolerance, dependency and addiction. · Previous UDS reviewed  · UDS preformed today for compliance. · Patient told can not receive any pain medications from any other source. · No evidence of abuse, diversion or aberrant behavior.   · Medications and/or procedures to improve function and quality of life- patient understanding with this and that may not be pain free  · Discussed with patient about safe storage of medications at home  · Discussed possible weaning of medication dosing dependent on treatment/procedure results. · Discussed with patient about risks with procedure including infection, reaction to medication, increased pain, or bleeding. · Procedure notes reviewed in detail   · Received 80% relief of low back pain from L-facet MBB # 1 and continues to receive 75% relief. Discussed repeating when needing to. · Main complaint is radicular pain   · Reveiwed Lumbar MRI and CT scan. · Plan LESI # 1 @ L4-5. Procedure discussed with patient  · If patient is on blood thinners will need approval to hold yes- On Eliquis needs cardiology clearance   · Reviewed UDS appropriate. In good laura will prescribe Norco 5/325 TID prn at this time until relief from procedure.  14 day trial.  · Continue Lyrica from pcp   · Will get next UDS at procedure FU.              Return for LESI # 1 @ L4-5. , follow up after procedure.

## 2022-05-23 NOTE — H&P
[address] [address]  History and Physical Update    Pt Name: Jeanette Duran  MRN: [id number]  YOB: 1970  Date of evaluation: 5/23/2022      I have examined the patient and reviewed the H&P/Consult and there are no changes to the patient or plans.         Electronically signed by Sarah Samuel MD on 5/23/2022 at 10:58 AM

## 2022-05-23 NOTE — PROGRESS NOTES
1109 Patient to phase 2 from surgery, report from [facility]. Patient asleep, snoring. Opens eyes to name, immediately falls back asleep. Vitals assessed, stable on room air. RN to remain at bedside. 1110 Patient resting comfortably in bed. Continuous pulse oc remains in place. 1115 Patient opens eyes to name, immediately falls back asleep. Vitals remain stable on room air. 1120 Patient able to stay awake and oriented. Denies any pain at this time. Snack and drink given per patient preference. 1150 Patient states he feels ready for discharge at this time. IV taken out with no complications,  called. 1155 Patient taken to discharge doors in wheelchair in stable condition. Discharged with cane, cell phone, AVS and all belongings.

## 2022-05-23 NOTE — ANESTHESIA POSTPROCEDURE EVALUATION
Department of Anesthesiology  Postprocedure Note    Patient: Jamilah Clinton  MRN: [id number]  YOB: 1970  Date of evaluation: 5/23/2022  Time:  12:28 PM     Procedure Summary     Date: 05/23/22 Room / Location: [address] [place] [address] 03 / [address] [facility] [address]    Anesthesia Start: 0635 Anesthesia Stop: 1109    Procedure: LESI  @ L5. (N/A ) Diagnosis: (Lumbosacral radiculitis)    Surgeons: Melva Sandoval MD Responsible Provider: Marci Wolf DO    Anesthesia Type: Not recorded ASA Status: 3          Anesthesia Type: No value filed. Atilio Phase I:      Atilio Phase II: Atilio Score: 9    Last vitals: Reviewed and per EMR flowsheets.        Anesthesia Post Evaluation    Patient location during evaluation: bedside  Patient participation: complete - patient participated  Level of consciousness: awake and alert  Pain score: 0  Airway patency: patent  Nausea & Vomiting: no nausea and no vomiting  Complications: no  Cardiovascular status: hemodynamically stable and blood pressure returned to baseline  Respiratory status: spontaneous ventilation, room air and acceptable  Hydration status: stable

## 2022-05-26 ENCOUNTER — APPOINTMENT (OUTPATIENT)
Dept: GENERAL RADIOLOGY | Age: 52
End: 2022-05-26
Payer: MEDICARE

## 2022-05-26 ENCOUNTER — HOSPITAL ENCOUNTER (EMERGENCY)
Age: 52
Discharge: HOME | End: 2022-05-26
Payer: MEDICARE

## 2022-05-26 ENCOUNTER — APPOINTMENT (OUTPATIENT)
Dept: ULTRASOUND IMAGING | Age: 52
End: 2022-05-26
Payer: MEDICARE

## 2022-05-26 VITALS
OXYGEN SATURATION: 94 % | SYSTOLIC BLOOD PRESSURE: 155 MMHG | DIASTOLIC BLOOD PRESSURE: 98 MMHG | WEIGHT: 292 LBS | HEART RATE: 67 BPM | HEIGHT: 68 IN | TEMPERATURE: 97.8 F | BODY MASS INDEX: 44.25 KG/M2 | RESPIRATION RATE: 16 BRPM

## 2022-05-26 DIAGNOSIS — R10.13 DYSPEPSIA: Primary | ICD-10-CM

## 2022-05-26 DIAGNOSIS — K21.9 GASTROESOPHAGEAL REFLUX DISEASE, UNSPECIFIED WHETHER ESOPHAGITIS PRESENT: ICD-10-CM

## 2022-05-26 LAB
ALBUMIN SERPL-MCNC: 4.2 G/DL (ref 3.5–5.1)
ALP BLD-CCNC: 156 U/L (ref 38–126)
ALT SERPL-CCNC: 32 U/L (ref 11–66)
ANION GAP SERPL CALCULATED.3IONS-SCNC: 13 MEQ/L (ref 8–16)
AST SERPL-CCNC: 28 U/L (ref 5–40)
BASOPHILS # BLD: 0.4 %
BASOPHILS ABSOLUTE: 0 THOU/MM3 (ref 0–0.1)
BILIRUB SERPL-MCNC: 0.5 MG/DL (ref 0.3–1.2)
BILIRUBIN DIRECT: < 0.2 MG/DL (ref 0–0.3)
BUN BLDV-MCNC: 26 MG/DL (ref 7–22)
CALCIUM SERPL-MCNC: 8.7 MG/DL (ref 8.5–10.5)
CHLORIDE BLD-SCNC: 99 MEQ/L (ref 98–111)
CO2: 24 MEQ/L (ref 23–33)
CREAT SERPL-MCNC: 1.1 MG/DL (ref 0.4–1.2)
EKG ATRIAL RATE: 68 BPM
EKG P AXIS: 47 DEGREES
EKG P-R INTERVAL: 144 MS
EKG Q-T INTERVAL: 416 MS
EKG QRS DURATION: 80 MS
EKG QTC CALCULATION (BAZETT): 442 MS
EKG R AXIS: -29 DEGREES
EKG T AXIS: 19 DEGREES
EKG VENTRICULAR RATE: 68 BPM
EOSINOPHIL # BLD: 2.3 %
EOSINOPHILS ABSOLUTE: 0.2 THOU/MM3 (ref 0–0.4)
ERYTHROCYTE [DISTWIDTH] IN BLOOD BY AUTOMATED COUNT: 14.2 % (ref 11.5–14.5)
ERYTHROCYTE [DISTWIDTH] IN BLOOD BY AUTOMATED COUNT: 45.9 FL (ref 35–45)
GLUCOSE BLD-MCNC: 278 MG/DL (ref 70–108)
HCT VFR BLD CALC: 49 % (ref 42–52)
HEMOGLOBIN: 16.6 GM/DL (ref 14–18)
IMMATURE GRANS (ABS): 0.06 THOU/MM3 (ref 0–0.07)
IMMATURE GRANULOCYTES: 0.7 %
LIPASE: 64.5 U/L (ref 5.6–51.3)
LYMPHOCYTES # BLD: 19.2 %
LYMPHOCYTES ABSOLUTE: 1.7 THOU/MM3 (ref 1–4.8)
MCH RBC QN AUTO: 30.2 PG (ref 26–33)
MCHC RBC AUTO-ENTMCNC: 33.9 GM/DL (ref 32.2–35.5)
MCV RBC AUTO: 89.3 FL (ref 80–94)
MONOCYTES # BLD: 8.6 %
MONOCYTES ABSOLUTE: 0.8 THOU/MM3 (ref 0.4–1.3)
NUCLEATED RED BLOOD CELLS: 0 /100 WBC
OSMOLALITY CALCULATION: 286.7 MOSMOL/KG (ref 275–300)
PLATELET # BLD: 190 THOU/MM3 (ref 130–400)
PMV BLD AUTO: 11.2 FL (ref 9.4–12.4)
POTASSIUM SERPL-SCNC: 4.4 MEQ/L (ref 3.5–5.2)
RBC # BLD: 5.49 MILL/MM3 (ref 4.7–6.1)
SEG NEUTROPHILS: 68.8 %
SEGMENTED NEUTROPHILS ABSOLUTE COUNT: 6.2 THOU/MM3 (ref 1.8–7.7)
SODIUM BLD-SCNC: 136 MEQ/L (ref 135–145)
TOTAL PROTEIN: 7.1 G/DL (ref 6.1–8)
TROPONIN T: < 0.01 NG/ML
WBC # BLD: 9 THOU/MM3 (ref 4.8–10.8)

## 2022-05-26 PROCEDURE — 82248 BILIRUBIN DIRECT: CPT

## 2022-05-26 PROCEDURE — 84484 ASSAY OF TROPONIN QUANT: CPT

## 2022-05-26 PROCEDURE — 99285 EMERGENCY DEPT VISIT HI MDM: CPT

## 2022-05-26 PROCEDURE — 71046 X-RAY EXAM CHEST 2 VIEWS: CPT

## 2022-05-26 PROCEDURE — 85025 COMPLETE CBC W/AUTO DIFF WBC: CPT

## 2022-05-26 PROCEDURE — 83690 ASSAY OF LIPASE: CPT

## 2022-05-26 PROCEDURE — 76705 ECHO EXAM OF ABDOMEN: CPT

## 2022-05-26 PROCEDURE — 80053 COMPREHEN METABOLIC PANEL: CPT

## 2022-05-26 PROCEDURE — 93010 ELECTROCARDIOGRAM REPORT: CPT | Performed by: INTERNAL MEDICINE

## 2022-05-26 PROCEDURE — 93005 ELECTROCARDIOGRAM TRACING: CPT | Performed by: NURSE PRACTITIONER

## 2022-05-26 RX ORDER — PANTOPRAZOLE SODIUM 40 MG/1
40 TABLET, DELAYED RELEASE ORAL
Qty: 60 TABLET | Refills: 0 | Status: SHIPPED | OUTPATIENT
Start: 2022-05-26 | End: 2022-09-12

## 2022-05-26 ASSESSMENT — PAIN DESCRIPTION - ORIENTATION: ORIENTATION: UPPER

## 2022-05-26 ASSESSMENT — PAIN DESCRIPTION - FREQUENCY: FREQUENCY: CONTINUOUS

## 2022-05-26 ASSESSMENT — PAIN DESCRIPTION - DESCRIPTORS: DESCRIPTORS: ACHING

## 2022-05-26 ASSESSMENT — PAIN DESCRIPTION - PAIN TYPE: TYPE: ACUTE PAIN

## 2022-05-26 ASSESSMENT — PAIN SCALES - GENERAL: PAINLEVEL_OUTOF10: 7

## 2022-05-26 ASSESSMENT — PAIN - FUNCTIONAL ASSESSMENT: PAIN_FUNCTIONAL_ASSESSMENT: 0-10

## 2022-05-26 ASSESSMENT — ENCOUNTER SYMPTOMS
CHEST TIGHTNESS: 0
ABDOMINAL DISTENTION: 0
ABDOMINAL PAIN: 1
NAUSEA: 1
CONSTIPATION: 0
COLOR CHANGE: 0
DIARRHEA: 0
SHORTNESS OF BREATH: 1
VOMITING: 0

## 2022-05-26 ASSESSMENT — PAIN DESCRIPTION - LOCATION: LOCATION: ABDOMEN

## 2022-05-26 NOTE — ED TRIAGE NOTES
Patient presents to ER with complaints of upper abdominal pain and belching that started yesterday. Patient reports also shortness of breath after abdominal pain started. Patient reports eating [employer] fried chicken yesterday.

## 2022-05-26 NOTE — ED PROVIDER NOTES
[facility] Emergency Department    CHIEF COMPLAINT       Chief Complaint   Patient presents with    Abdominal Pain    Other     belching    Shortness of Breath       Nurses Notes reviewed and I agree except as noted in the HPI. HISTORY OF PRESENT ILLNESS    Carlos Oviedo is a 46 y.o. male who presents to the ED for evaluation of abdominal pain. Patient notes symptoms began yesterday. Located in epigastric area. Described as a pressure. Notes he had least chicken before this happened, notes he took some ibuprofen and had resolution of symptoms. He woke up this morning with pain returning. Currently rates pain 7 out of 10. Notes nausea but denies vomiting or diarrhea, notes some shortness of breath associated with pain. Denies any chest pain. Denies any history of coronary artery disease. Does note history of GERD. Notes history of significant COVID-19, required mechanical ventilation. Notes he has seen gastroenterology in the past, was scheduled for a EGD, but for some reason had lost to follow-up. Patient denies taking any acid reducing medicines at this time. Patient denies vomiting diarrhea constipation, denies any change in urination. HPI was provided by the patient. REVIEW OF SYSTEMS     Review of Systems   Constitutional: Negative for activity change, chills and fever. Respiratory: Positive for shortness of breath. Negative for chest tightness. Cardiovascular: Negative for chest pain. Gastrointestinal: Positive for abdominal pain and nausea. Negative for abdominal distention, constipation, diarrhea and vomiting. Genitourinary: Negative for decreased urine volume, difficulty urinating and dysuria. Skin: Negative for color change and rash. Allergic/Immunologic: Negative for immunocompromised state. Neurological: Negative for dizziness, weakness, light-headedness, numbness and headaches. Hematological: Does not bruise/bleed easily.    Psychiatric/Behavioral: Negative for agitation, behavioral problems and confusion. PAST MEDICAL HISTORY     Past Medical History:   Diagnosis Date    Aortic stenosis, mild to moderate     BPH (benign prostatic hyperplasia)     Carpal tunnel syndrome on right     rt hand    Chronic gout     COVID-19 09/2020    DM2 (diabetes mellitus, type 2) ([facility] [address]     Dyslipidemia     GERD (gastroesophageal reflux disease)     Heart failure with preserved ejection fraction (HCC)     History of alcohol abuse     History of atrial fibrillation     History of osteomyelitis     Hx of R foot wound, osteomyelitis July 2018 requiring surgery and IV Vanco    Hyperlipidemia     Hypertension, essential     Hypogonadism, male     Major depression     Mood disorder ([facility] [address] [facility]     Morbid obesity ([facility] [address] [facility]     DURAN (nonalcoholic steatohepatitis)     KIARA treated with BiPAP     Vitamin D deficiency        SURGICALHISTORY      has a past surgical history that includes tracheostomy; Foot surgery (Right); hip surgery (Left, 6/29/2020); sigmoidoscopy (N/A, 10/26/2020); Upper gastrointestinal endoscopy (N/A, 11/14/2020); Colonoscopy (N/A, 11/15/2020); Tonsillectomy; Upper gastrointestinal endoscopy (N/A, 12/27/2021); Endoscopy, colon, diagnostic; Pain management procedure (Bilateral, 4/11/2022); and Pain management procedure (N/A, 5/23/2022). CURRENT MEDICATIONS       Current Discharge Medication List      CONTINUE these medications which have NOT CHANGED    Details   hydrALAZINE (APRESOLINE) 50 MG tablet Take 1 tablet by mouth 3 times daily  Qty: 90 tablet, Refills: 3      !!  Cholecalciferol (VITAMIN D3) 50 MCG (2000 UT) CAPS TAKE ONE CAPSULE BY MOUTH ONCE DAILY      HYDROcodone-acetaminophen (NORCO) 5-325 MG per tablet TAKE 1 TABLET BY MOUTH EVERY 4-TO-6 HOURS AS NEEDED      GLIPIZIDE XL 5 MG extended release tablet TAKE ONE TABLET BY MOUTH ONCE DAILY      losartan-hydroCHLOROthiazide (HYZAAR) 50-12.5 MG per tablet TAKE ONE TABLET BY MOUTH ONCE DAILY potassium chloride (KLOR-CON) 10 MEQ extended release tablet TAKE ONE TABLET BY MOUTH ONCE DAILY      pregabalin (LYRICA) 150 MG capsule TAKE ONE CAPSULE BY MOUTH THREE TIMES DAILY      secukinumab (COSENTYX) 150 MG/ML SOSY Inject 150mg subcu weekly for 5 weeks then 4 weeks later start 150mg subcu every 4 weeks. Qty: 5 mL, Refills: 2    Associated Diagnoses: Ankylosing spondylitis of multiple sites in spine (HCC)      clotrimazole-betamethasone (LOTRISONE) 1-0.05 % cream Apply topically 2 times daily. Qty: 1 each, Refills: 0      potassium chloride (KLOR-CON M) 20 MEQ extended release tablet Take 1 tablet by mouth daily  Qty: 30 tablet, Refills: 2      apixaban (ELIQUIS) 5 MG TABS tablet Take 10mg by mouth two times daily for 6 days followed by 5mg by mouth two times daily  Qty: 60 tablet, Refills: 1      ferrous sulfate (IRON 325) 325 (65 Fe) MG tablet Take 1 tablet by mouth 2 times daily (with meals)  Qty: 30 tablet, Refills: 3      albuterol sulfate HFA (PROVENTIL HFA) 108 (90 Base) MCG/ACT inhaler Inhale 2 puffs into the lungs every 6 hours as needed for Wheezing  Qty: 1 Inhaler, Refills: 3      vitamin C (ASCORBIC ACID) 500 MG tablet Take 2 tablets by mouth daily  Qty: 30 tablet, Refills: 0      !! CHOLECALCIFEROL PO Take 2,000 Units by mouth daily      atorvastatin (LIPITOR) 40 MG tablet Take 40 mg by mouth nightly      acetaminophen (TYLENOL) 325 MG tablet Take 650 mg by mouth every 4 hours as needed for Pain      busPIRone (BUSPAR) 10 MG tablet Take 10 mg by mouth 2 times daily       allopurinol (ZYLOPRIM) 300 MG tablet Take 1 tablet by mouth daily  Qty: 90 tablet, Refills: 0    Associated Diagnoses: H/O: gout;  Medication monitoring encounter; Chronic tophaceous gout      Multiple Vitamins-Minerals (THERAPEUTIC MULTIVITAMIN-MINERALS) tablet Take 1 tablet by mouth daily      Probiotic Product (SOBIA-BID PROBIOTIC PO) Take 1 tablet by mouth daily       ondansetron (ZOFRAN) 4 MG tablet Take 4 mg by mouth every 8 hours as needed for Nausea or Vomiting      folic acid (FOLVITE) 1 MG tablet Take 1 mg by mouth daily      furosemide (LASIX) 40 MG tablet Take 40 mg by mouth daily       sitaGLIPtan-metformin (JANUMET)  MG per tablet Take 1 tablet by mouth 2 times daily (with meals)       senna (SENOKOT) 8.6 MG tablet Take 1 tablet by mouth 2 times daily      ARIPiprazole (ABILIFY PO) Take 15 mg by mouth daily     Associated Diagnoses: DDD (degenerative disc disease)      Citalopram Hydrobromide (CELEXA PO) Take 30 mg by mouth daily From [employer] [facility]       Blood Glucose Monitoring Suppl (FREESTYLE LITE) ARTIE Patient needs all supplies for qd testing. DX: 250.00  Qty: 1 Device, Refills: 11       !! - Potential duplicate medications found. Please discuss with provider. ALLERGIES     is allergic to penicillins. FAMILY HISTORY     He indicated that his mother is . He indicated that his father is alive. He indicated that his brother is alive. He indicated that his daughter is alive. He indicated that both of his sons are alive. He indicated that the status of his paternal aunt is unknown.   family history includes Cancer in his paternal aunt; Heart Disease in his mother.     SOCIAL HISTORY       Social History     Socioeconomic History    Marital status:      Spouse name: Not on file    Number of children: Not on file    Years of education: Not on file    Highest education level: Not on file   Occupational History    Not on file   Tobacco Use    Smoking status: Never Smoker    Smokeless tobacco: Never Used   Vaping Use    Vaping Use: Never used   Substance and Sexual Activity    Alcohol use: Not Currently     Comment: hx of abuse    Drug use: No    Sexual activity: Not Currently   Other Topics Concern    Not on file   Social History Narrative    Not on file     Social Determinants of Health     Financial Resource Strain:     Difficulty of Paying Living Expenses: Not on file   Food Insecurity:     Worried About [facility] in the Last Year: Not on file    Basim of Food in the Last Year: Not on file   Transportation Needs:     Lack of Transportation (Medical): Not on file    Lack of Transportation (Non-Medical): Not on file   Physical Activity:     Days of Exercise per Week: Not on file    Minutes of Exercise per Session: Not on file   Stress:     Feeling of Stress : Not on file   Social Connections:     Frequency of Communication with Friends and Family: Not on file    Frequency of Social Gatherings with Friends and Family: Not on file    Attends Sikhism Services: Not on file    Active Member of [address] [place] [address] [employer] or Organizations: Not on file    Attends Club or Organization Meetings: Not on file    Marital Status: Not on file   Intimate Partner Violence:     Fear of Current or Ex-Partner: Not on file    Emotionally Abused: Not on file    Physically Abused: Not on file    Sexually Abused: Not on file   Housing Stability:     Unable to Pay for Housing in the Last Year: Not on file    Number of [place] in the Last Year: Not on file    Unstable Housing in the Last Year: Not on file       PHYSICAL EXAM     INITIAL VITALS:  height is 5' 8\" (1.727 m) and weight is 292 lb (132.5 kg). His oral temperature is 97.8 °F (36.6 °C). His blood pressure is 155/98 (abnormal) and his pulse is 67. His respiration is 16 and oxygen saturation is 94%. Physical Exam  Vitals and nursing note reviewed. Constitutional:       Appearance: Normal appearance. He is well-developed. He is obese. He is not ill-appearing, toxic-appearing or diaphoretic. HENT:      Head: Normocephalic. Right Ear: External ear normal.      Left Ear: External ear normal.      Nose: Nose normal.      Mouth/Throat:      Pharynx: Uvula midline. Eyes:      Conjunctiva/sclera: Conjunctivae normal.   Cardiovascular:      Rate and Rhythm: Normal rate and regular rhythm.       Heart sounds: Normal heart Final Result   Stable radiographic appearance of the chest. No evidence of an acute process. **This report has been created using voice recognition software. It may contain minor errors which are inherent in voice recognition technology. **      Final report electronically signed by Dr. Melissa Ledesma on 5/26/2022 10:14 AM            LABS:   Labs Reviewed   CBC WITH AUTO DIFFERENTIAL - Abnormal; Notable for the following components:       Result Value    RDW-SD 45.9 (*)     All other components within normal limits   BASIC METABOLIC PANEL - Abnormal; Notable for the following components:    Glucose 278 (*)     BUN 26 (*)     All other components within normal limits   HEPATIC FUNCTION PANEL - Abnormal; Notable for the following components:    Alkaline Phosphatase 156 (*)     All other components within normal limits   LIPASE - Abnormal; Notable for the following components:    Lipase 64.5 (*)     All other components within normal limits   GLOMERULAR FILTRATION RATE, ESTIMATED - Abnormal; Notable for the following components:    Est, Glom Filt Rate 85 (*)     All other components within normal limits   TROPONIN   ANION GAP   OSMOLALITY       EMERGENCY DEPARTMENT COURSE:   Vitals:    Vitals:    05/26/22 0926   BP: (!) 155/98   Pulse: 67   Resp: 16   Temp: 97.8 °F (36.6 °C)   TempSrc: Oral   SpO2: 94%   Weight: 292 lb (132.5 kg)   Height: 5' 8\" (1.727 m)       MDM    Patient was seen and evaluated in the emergency department. Patient appeared to be in no acute distress, vital signs reviewed, slight hypertension noted. Physical exam was completed, no significant abdominal tenderness noted, negative Lambert sign, no CVA tenderness. Labs were obtained, no significant findings noted. X-ray of the chest was obtained, no significant findings noted. Right upper quadrant ultrasound completed, no significant findings noted.   Discussed my findings and plan of care with the patient and his wife, they verbalized understanding. Will restart the patient on Protonix, he is advised to follow-up with his gastroenterologist for EGD that was scheduled in the past.  They verbalized understanding of plan of care. Medications - No data to display    Patient was seenindependently by myself. The patient's final impression and disposition and plan was determined by myself. CRITICAL CARE:   None    CONSULTS:  None    PROCEDURES:  None    FINAL IMPRESSION     1. Dyspepsia    2. Gastroesophageal reflux disease, unspecified whether esophagitis present          DISPOSITION/PLAN   Patient discharged    PATIENT REFERREDTO:  Aislinn Kirk MD  [address] [place] [address]. [address]  [phone]    Call   For follow up and evaluation      DISCHARGE MEDICATIONS:  Current Discharge Medication List          (Please note that portions of this note were completed with a voice recognition program.  Efforts were made to edit the dictations but occasionally words are mis-transcribed.)      Provider:  I personally performed the services described in the documentation,reviewed and edited the documentation which was dictated to the scribe in my presence, and it accurately records my words and actions.     Yobany Rodrigez CNP 05/26/22 11:23 AM    Barry Rodrigez, APRN - CNP        [facility], APRN - CNP  05/26/22 8190

## 2022-05-27 ENCOUNTER — OFFICE VISIT (OUTPATIENT)
Dept: PULMONOLOGY | Age: 52
End: 2022-05-27
Payer: MEDICARE

## 2022-05-27 VITALS
BODY MASS INDEX: 43.56 KG/M2 | DIASTOLIC BLOOD PRESSURE: 86 MMHG | WEIGHT: 287.4 LBS | OXYGEN SATURATION: 94 % | HEIGHT: 68 IN | HEART RATE: 72 BPM | SYSTOLIC BLOOD PRESSURE: 142 MMHG | TEMPERATURE: 98.4 F

## 2022-05-27 DIAGNOSIS — G47.33 OSA TREATED WITH BIPAP: Primary | ICD-10-CM

## 2022-05-27 LAB — GFR SERPL CREATININE-BSD FRML MDRD: 70 ML/MIN/1.73M2

## 2022-05-27 PROCEDURE — 99214 OFFICE O/P EST MOD 30 MIN: CPT | Performed by: INTERNAL MEDICINE

## 2022-05-27 PROCEDURE — G8417 CALC BMI ABV UP PARAM F/U: HCPCS | Performed by: INTERNAL MEDICINE

## 2022-05-27 PROCEDURE — 1036F TOBACCO NON-USER: CPT | Performed by: INTERNAL MEDICINE

## 2022-05-27 PROCEDURE — 3017F COLORECTAL CA SCREEN DOC REV: CPT | Performed by: INTERNAL MEDICINE

## 2022-05-27 PROCEDURE — G8427 DOCREV CUR MEDS BY ELIG CLIN: HCPCS | Performed by: INTERNAL MEDICINE

## 2022-05-27 NOTE — PATIENT INSTRUCTIONS
Recommendations/Plan:  -He was advised to submit his down load report from his BiPAP for next 1month starting from today to document his > 4hour compliance. He was informed about the possibility of loosing his BiPAP if he don't use it with good compliance for >4hours i.e >70%. He verbalizes understanding.  -He was instructed call his family physician/Primary care physician as soon as possible for optimization of his anti HTN medications. He verbalizes understanding.  -He was instructed to extend his sleep schedule to 7 to 9 hours in a given 24hour period continuously. -He was educated and advised to apply his current mask properly and tight enough to minimize leaks.  -Patient will be given a prescription for chin strap to use with his current mask to avoid leaks and to improve his dryness of mouth.  -He is aware of the consequences of poor compliance to his recommended positive air way pressure therapy including increased risk for cardiovascular and cerebrovascular events and morbidity including death.  -He was informed about the possibility of loosing his BiPAP if he don't use it with good compliance for >4hours i.e >70%. He verbalizes understanding.  -He was advised to continue current positive airway pressure therapy with above described pressure.  -He was advised to keep good compliance with current recommended pressure to get optimal results and clinical improvement.  -Follow with my clinic in 3months for clinical reevaluation with review of download. -He was advised to call [employer] regarding supplies if needed. -He was advised to continue to practice good sleep hygiene practices.  -He was advised to loose weight by controlling diet and doing exercise.  -He was advised to call my office for earlier appointment if needed for worsening of sleep symptoms.  -Alba Mitchell was educated about my impression and plan. Patient verbalizes understanding.

## 2022-05-27 NOTE — PROGRESS NOTES
Chief Complaint: Sidney Fan is here for a titration follow up with download. Mallampati airway Class: 4  Neck Circumference: 20.5    [place] sleepiness score 5/27/22: 7  SAQLI: 85    Diagnostic Data:   PAP Download:   Recorded compliance dates:4/26/22-5/25/22  More than 4hour usage compliance was:{43%.   Average residual Apnea- Hypoapnea index on current pressue was:1.3      PAP Type spont   Level  25/20     Average usuage hours per day was: 4hr 57min     Interface: FFM    Provider:  [x][facility]  [][facility]  [][facility]  [][facility]         [][facility] []Other:

## 2022-05-31 ENCOUNTER — OFFICE VISIT (OUTPATIENT)
Dept: RHEUMATOLOGY | Age: 52
End: 2022-05-31
Payer: MEDICARE

## 2022-05-31 VITALS
HEIGHT: 68 IN | SYSTOLIC BLOOD PRESSURE: 148 MMHG | BODY MASS INDEX: 43.38 KG/M2 | DIASTOLIC BLOOD PRESSURE: 72 MMHG | HEART RATE: 89 BPM | WEIGHT: 286.2 LBS | OXYGEN SATURATION: 95 %

## 2022-05-31 DIAGNOSIS — G89.29 CHRONIC MIDLINE LOW BACK PAIN WITH BILATERAL SCIATICA: ICD-10-CM

## 2022-05-31 DIAGNOSIS — M54.42 CHRONIC MIDLINE LOW BACK PAIN WITH BILATERAL SCIATICA: ICD-10-CM

## 2022-05-31 DIAGNOSIS — M45.0 ANKYLOSING SPONDYLITIS OF MULTIPLE SITES IN SPINE (HCC): Primary | ICD-10-CM

## 2022-05-31 DIAGNOSIS — M1A.9XX1 CHRONIC TOPHACEOUS GOUT: ICD-10-CM

## 2022-05-31 DIAGNOSIS — M21.372 LEFT FOOT DROP: ICD-10-CM

## 2022-05-31 DIAGNOSIS — M15.9 PRIMARY OSTEOARTHRITIS INVOLVING MULTIPLE JOINTS: ICD-10-CM

## 2022-05-31 DIAGNOSIS — Z51.81 MEDICATION MONITORING ENCOUNTER: ICD-10-CM

## 2022-05-31 DIAGNOSIS — M54.41 CHRONIC MIDLINE LOW BACK PAIN WITH BILATERAL SCIATICA: ICD-10-CM

## 2022-05-31 DIAGNOSIS — Z87.39 H/O: GOUT: ICD-10-CM

## 2022-05-31 DIAGNOSIS — R21 RASH: ICD-10-CM

## 2022-05-31 DIAGNOSIS — M46.1 BILATERAL SACROILIITIS (HCC): ICD-10-CM

## 2022-05-31 PROCEDURE — G8417 CALC BMI ABV UP PARAM F/U: HCPCS | Performed by: NURSE PRACTITIONER

## 2022-05-31 PROCEDURE — 99214 OFFICE O/P EST MOD 30 MIN: CPT | Performed by: NURSE PRACTITIONER

## 2022-05-31 PROCEDURE — 1036F TOBACCO NON-USER: CPT | Performed by: NURSE PRACTITIONER

## 2022-05-31 PROCEDURE — 3017F COLORECTAL CA SCREEN DOC REV: CPT | Performed by: NURSE PRACTITIONER

## 2022-05-31 PROCEDURE — G8427 DOCREV CUR MEDS BY ELIG CLIN: HCPCS | Performed by: NURSE PRACTITIONER

## 2022-05-31 RX ORDER — ALLOPURINOL 300 MG/1
300 TABLET ORAL DAILY
Qty: 90 TABLET | Refills: 0 | Status: SHIPPED | OUTPATIENT
Start: 2022-05-31

## 2022-05-31 ASSESSMENT — ENCOUNTER SYMPTOMS
EYE ITCHING: 0
BACK PAIN: 1
CONSTIPATION: 0
SHORTNESS OF BREATH: 0
DIARRHEA: 0
TROUBLE SWALLOWING: 0
ABDOMINAL PAIN: 0
EYE PAIN: 0
COUGH: 0
NAUSEA: 0

## 2022-05-31 NOTE — PROGRESS NOTES
[facility] RHEUMATOLOGY FOLLOW UP NOTE       Date Of Service: 5/31/2022  Provider: SANDRA Ellington - ABI    Name: Miguel Anand   MRN: [id number]    Chief Complaint(s)     Chief Complaint   Patient presents with    Follow-up     2 month         History of Present Illness (HPI)     Miguel Anand  is a(n)51 y.o. male with a hx of abnormal liver function, alcohol abuse, anemia, arthropathy, carpal tunnel syndrome (right), depression, fatigue, foot pain, HTN, hyperuricemia, DURAN, obesity, KIARA, U3XF, diastolic heart failure, DDD here for the f/u evaluation of tophaceous gout. Interval hx:    - has not started the cosentyx- did not hear anything about it    pain affecting the feet, legs  Pain on a scale 0-10: 7/10  Type of pain: intermittent, varying pain. Cramping, numbness/tingling, shooting legs down left leg  Timing: morning  Aggravating factors: weather changes  Alleviating factors: percocet, hot baths    Associated symptoms:  + swelling/  Redness/ warmth (left foot), + AM stiffness lasting ~ 2 hours      REVIEW OF SYSTEMS: (ROS)    Review of Systems   Constitutional: Negative for fatigue, fever and unexpected weight change. HENT: Negative for congestion and trouble swallowing. Dry  mouth   Eyes: Negative for pain and itching. Respiratory: Negative for cough and shortness of breath. Cardiovascular: Negative for chest pain and leg swelling. Gastrointestinal: Negative for abdominal pain, constipation, diarrhea and nausea. Endocrine: Negative for cold intolerance and heat intolerance. Genitourinary: Negative for difficulty urinating, frequency and urgency. Musculoskeletal: Positive for arthralgias, back pain, myalgias and neck pain. Negative for joint swelling. Skin: Negative for rash. Neurological: Positive for weakness (left leg) and numbness (left leg). Negative for dizziness and headaches. Psychiatric/Behavioral: Positive for sleep disturbance.  The patient is not nervous/anxious.          Memory issues, depression       PAST MEDICAL HISTORY      Past Medical History:   Diagnosis Date    Aortic stenosis, mild to moderate     BPH (benign prostatic hyperplasia)     Carpal tunnel syndrome on right     rt hand    Chronic gout     COVID-19 09/2020    DM2 [facility]diabetes mellitus, type 2) ([facility] [address]     Dyslipidemia     GERD (gastroesophageal reflux disease)     Heart failure with preserved ejection fraction (HCC)     History of alcohol abuse     History of atrial fibrillation     History of osteomyelitis     Hx of R foot wound, osteomyelitis July 2018 requiring surgery and IV Vanco    Hyperlipidemia     Hypertension, essential     Hypogonadism, male     Major depression     Mood disorder ([facility] [address]     Morbid obesity ([facility] [address]     DURAN (nonalcoholic steatohepatitis)     KIARA treated with BiPAP     Vitamin D deficiency        PAST SURGICAL HISTORY      Past Surgical History:   Procedure Laterality Date    COLONOSCOPY N/A 11/15/2020    COLONOSCOPY DIAGNOSTIC [facility]    ENDOSCOPY, COLON, DIAGNOSTIC      FOOT SURGERY Right     2018, R foot osteomyelitis    HIP SURGERY Left 6/29/2020    bilateral hip steroid injection, Left HIP FIRST performed by Bev Bhatia MD at [place] [address] Bilateral 4/11/2022    bilateral L-facet MBB # 1 @ L4-5 and L5-S1 performed by Bev Bhatia MD at [facility] N/A 5/23/2022    LESI  @ L5. performed by Bev Bhatia MD at [facility] N/A 10/26/2020    SIGMOIDOSCOPY DIAGNOSTIC FLEXIBLE performed by Alejandro Templeton MD at [facility]      as a baby    UPPER GASTROINTESTINAL ENDOSCOPY N/A 11/14/2020    EGD DIAGNOSTIC ONLY performed by Celeste Ruiz MD at [address] [place] [address] N/A 12/27/2021    EGD performed by Chris Lousi MD at [facility]       FAMILY HISTORY      Family History   Problem Relation Age of Onset    Heart Disease Mother         MI    Cancer Paternal Aunt         lung       SOCIAL HISTORY      Social History     Tobacco History     Smoking Status  Never Smoker    Smokeless Tobacco Use  Never Used          Alcohol History     Alcohol Use Status  No Comment  hx of abuse          Drug Use     Drug Use Status  No          Sexual Activity     Sexually Active  Not Asked                ALLERGIES     Allergies   Allergen Reactions    Penicillins      Tolerated Zosyn 9/24/2020       CURRENT MEDICATIONS      Current Outpatient Medications   Medication Sig Dispense Refill    allopurinol (ZYLOPRIM) 300 MG tablet Take 1 tablet by mouth daily 90 tablet 0    pantoprazole (PROTONIX) 40 MG tablet Take 1 tablet by mouth 2 times daily (before meals) 60 tablet 0    hydrALAZINE (APRESOLINE) 50 MG tablet Take 1 tablet by mouth 3 times daily 90 tablet 3    Cholecalciferol (VITAMIN D3) 50 MCG (2000 UT) CAPS TAKE ONE CAPSULE BY MOUTH ONCE DAILY      HYDROcodone-acetaminophen (NORCO) 5-325 MG per tablet TAKE 1 TABLET BY MOUTH EVERY 4-TO-6 HOURS AS NEEDED      GLIPIZIDE XL 5 MG extended release tablet TAKE ONE TABLET BY MOUTH ONCE DAILY      losartan-hydroCHLOROthiazide (HYZAAR) 50-12.5 MG per tablet TAKE ONE TABLET BY MOUTH ONCE DAILY      potassium chloride (KLOR-CON) 10 MEQ extended release tablet TAKE ONE TABLET BY MOUTH ONCE DAILY      pregabalin (LYRICA) 150 MG capsule TAKE ONE CAPSULE BY MOUTH THREE TIMES DAILY      clotrimazole-betamethasone (LOTRISONE) 1-0.05 % cream Apply topically 2 times daily.  1 each 0    potassium chloride (KLOR-CON M) 20 MEQ extended release tablet Take 1 tablet by mouth daily 30 tablet 2    apixaban (ELIQUIS) 5 MG TABS tablet Take 10mg by mouth two times daily for 6 days followed by 5mg by mouth two times daily 60 tablet 1    ferrous sulfate (IRON 325) 325 (65 Fe) MG tablet Take 1 tablet by mouth 2 times daily (with meals) 30 tablet 3    albuterol sulfate HFA (PROVENTIL HFA) 108 (90 Base) MCG/ACT inhaler Inhale 2 puffs into the lungs every 6 hours as needed for Wheezing 1 Inhaler 3    vitamin C (ASCORBIC ACID) 500 MG tablet Take 2 tablets by mouth daily 30 tablet 0    CHOLECALCIFEROL PO Take 2,000 Units by mouth daily      atorvastatin (LIPITOR) 40 MG tablet Take 40 mg by mouth nightly      acetaminophen (TYLENOL) 325 MG tablet Take 650 mg by mouth every 4 hours as needed for Pain      busPIRone (BUSPAR) 10 MG tablet Take 10 mg by mouth 2 times daily       Multiple Vitamins-Minerals (THERAPEUTIC MULTIVITAMIN-MINERALS) tablet Take 1 tablet by mouth daily      Probiotic Product (SOBIA-BID PROBIOTIC PO) Take 1 tablet by mouth daily       ondansetron (ZOFRAN) 4 MG tablet Take 4 mg by mouth every 8 hours as needed for Nausea or Vomiting      folic acid (FOLVITE) 1 MG tablet Take 1 mg by mouth daily      furosemide (LASIX) 40 MG tablet Take 40 mg by mouth daily       sitaGLIPtan-metformin (JANUMET)  MG per tablet Take 1 tablet by mouth 2 times daily (with meals)       senna (SENOKOT) 8.6 MG tablet Take 1 tablet by mouth 2 times daily      ARIPiprazole (ABILIFY PO) Take 15 mg by mouth daily       Citalopram Hydrobromide (CELEXA PO) Take 30 mg by mouth daily From [facility]       Blood Glucose Monitoring Suppl (FREESTYLE LITE) ARTIE Patient needs all supplies for qd testing. DX: 250.00 1 Device 11    secukinumab (COSENTYX) 150 MG/ML SOSY Inject 150mg subcu weekly for 5 weeks then 4 weeks later start 150mg subcu every 4 weeks. (Patient not taking: Reported on 5/31/2022) 5 mL 2     No current facility-administered medications for this visit.        PHYSICAL EXAMINATION / OBJECTIVE   Objective:  BP (!) 148/72 (Site: Left Upper Arm, Position: Sitting, Cuff Size: Medium Adult)   Pulse 89   Ht 5' 7.99\" (1.727 m)   Wt 286 lb 3.2 oz (129.8 kg)   SpO2 95%   BMI 43.53 kg/m²     Physical Exam  Vitals reviewed. Constitutional:       Appearance: He is well-developed. Cardiovascular:      Rate and Rhythm: Normal rate and regular rhythm. Pulmonary:      Effort: Pulmonary effort is normal.      Breath sounds: Normal breath sounds. Musculoskeletal:      Cervical back: Normal range of motion and neck supple. Skin:     General: Skin is warm and dry. Findings: No rash. Comments: Redness bilateral eye lies, and nasolabial folds,   Scars -- left posterior chest wall  Nail thickening bilateral toenails  Onycholysis Rt fingernail  Transverse nail grooves bilat. Redness around nose, eyebrows   Neurological:      Mental Status: He is alert and oriented to person, place, and time. Psychiatric:         Thought Content: Thought content normal.       Upper extremities:   Shoulders:  + tender bilaterally  Elbows:      nontender  Wrists       nontender  Hands:     Tender bilat MCPs and PIPs.      Lower extremities:  Hips:      + tender left  Knees :   + tender left  Ankles: + tender and swelling bilat  Feet:       + MTP compression left, tenderness left midfoot        RAPID 3:   5/31/2022 --- RAPID 3: 5.3 + 8.5 + 7.0 = 20.8     Remission: <3  Low Disease Activity: <6  Moderate Disease Activity: >=6 and <=12  High Disease Activity: >12     LABS    CBC  Lab Results   Component Value Date    WBC 9.0 05/26/2022    RBC 5.49 05/26/2022    RBC 4.55 04/15/2012    HGB 16.6 05/26/2022    HCT 49.0 05/26/2022    MCV 89.3 05/26/2022    MCH 30.2 05/26/2022    MCHC 33.9 05/26/2022    RDW 19.9 04/06/2020     05/26/2022       CMP  Lab Results   Component Value Date    CALCIUM 8.7 05/26/2022    LABALBU 4.2 05/26/2022    LABALBU 4.4 01/26/2012    PROT 7.1 05/26/2022     05/26/2022    K 4.4 05/26/2022    K 4.2 03/10/2022    CO2 24 05/26/2022    CL 99 05/26/2022    BUN 26 05/26/2022    CREATININE 1.1 05/26/2022    ALKPHOS 156 05/26/2022    ALT 32 05/26/2022    AST 28 05/26/2022 HgBA1c: No components found for: HGBA1C    Lab Results   Component Value Date    VITD25 26 10/17/2020         Lab Results   Component Value Date    ANASCRN None Detected 10/06/2020     No results found for: SSA  No results found for: SSB  No results found for: ANTI-SMITH  Lab Results   Component Value Date    DSDNAAB SEE BELOW 04/27/2016      No results found for: ANTIRNP  No results found for: C3, C4  No results found for: CCPAB  Lab Results   Component Value Date    RF <10 01/23/2019       No components found for: CANCASCRN, APANCASCRN  Lab Results   Component Value Date    SEDRATE 9 04/06/2020     Lab Results   Component Value Date    CRP 2.35 (H) 12/05/2021       RADIOLOGY:         ASSESSMENT/PLAN    Assessment   Plan       Undiff axial spondyloarthritis  Chronic low back pain  - prior hx of dermatomyositis and rheumatoid arthritis. Serologies inconsistent with RA, normal CK/aldolase despite d/c of therapy. Patient with active inflammatory arthritis, + enthesopathy, + CT/abd pelvis consistent with sacroiliitis and SI joints fusions bilat, and colonic thickening (neg C scope eval 11/2020)  - enbrel (shingles, staph infection)   - cosentyx 150 mg subcu q 4 weeks after loading dose- checking on PA status    Left foot drop  DVT- on coumdain  - ? post COVID vs other- ? Sensory neuropathy vs radiculopathy given weakness, decreased proprioception and pain sensation in left foot. - EMG/NCV ordered- was not able to have completed due to swelling    Rash- face, back. - + nail changes.  ? Tinea vs psoriasis vs eczema vs other.    - referral to dermatology- did not go    Polyarticular tophaceous gout  - prior left elbow aspiration crystal studies + for monosodium urate   - continue allopurinol 500 mg daily- titrate to goal uric acid < 5 mg/dl   - recheck uric acid- elevated on last evaluation    Osteoarthritis of multiple joints   - Percocet PRN    Medication monitoring   - CBC, CMP, sed rate, CRP q 8 weeks   - TB gold negative 11/2021      No follow-ups on file. Electronically signed by SANDRA Woodson CNP on 5/31/2022 at 2:48 PM    New Prescriptions    No medications on file       Thank you for allowing me to participate in the care of this patient. Please call if there are any questions.     [phone]

## 2022-06-07 ENCOUNTER — OFFICE VISIT (OUTPATIENT)
Dept: PHYSICAL MEDICINE AND REHAB | Age: 52
End: 2022-06-07
Payer: MEDICARE

## 2022-06-07 VITALS
WEIGHT: 286 LBS | HEIGHT: 68 IN | SYSTOLIC BLOOD PRESSURE: 138 MMHG | DIASTOLIC BLOOD PRESSURE: 82 MMHG | BODY MASS INDEX: 43.35 KG/M2

## 2022-06-07 DIAGNOSIS — M47.816 LUMBAR SPONDYLOSIS: ICD-10-CM

## 2022-06-07 DIAGNOSIS — M47.816 LUMBAR FACET ARTHROPATHY: ICD-10-CM

## 2022-06-07 DIAGNOSIS — M54.17 LUMBOSACRAL RADICULITIS: Primary | ICD-10-CM

## 2022-06-07 DIAGNOSIS — M48.062 SPINAL STENOSIS OF LUMBAR REGION WITH NEUROGENIC CLAUDICATION: ICD-10-CM

## 2022-06-07 DIAGNOSIS — G89.4 CHRONIC PAIN SYNDROME: ICD-10-CM

## 2022-06-07 DIAGNOSIS — M79.2 NERVE PAIN: ICD-10-CM

## 2022-06-07 PROCEDURE — G8427 DOCREV CUR MEDS BY ELIG CLIN: HCPCS | Performed by: NURSE PRACTITIONER

## 2022-06-07 PROCEDURE — 1036F TOBACCO NON-USER: CPT | Performed by: NURSE PRACTITIONER

## 2022-06-07 PROCEDURE — 3017F COLORECTAL CA SCREEN DOC REV: CPT | Performed by: NURSE PRACTITIONER

## 2022-06-07 PROCEDURE — G8417 CALC BMI ABV UP PARAM F/U: HCPCS | Performed by: NURSE PRACTITIONER

## 2022-06-07 PROCEDURE — 99214 OFFICE O/P EST MOD 30 MIN: CPT | Performed by: NURSE PRACTITIONER

## 2022-06-07 ASSESSMENT — ENCOUNTER SYMPTOMS: BACK PAIN: 1

## 2022-06-07 NOTE — PROGRESS NOTES
[address]  Dept: [phone]  Dept Fax: [phone]: [phone]    Visit Date: 6/7/2022    Functionality Assessment/Goals Worksheet     On a scale of 0 (Does not Interfere) to 10 (Completely Interferes)     1. Which number describes how during the past week pain has interfered with       the following:  A. General Activity:  9  B. Mood: 6  C. Walking Ability:  8  D. Normal Work (Includes both work outside the home and housework):  10  E. Relations with Other People:   0  F. Sleep:   0  G. Enjoyment of Life:   0    2. Patient Prefers to Take their Pain Medications:     [x]  On a regular basis   [x]  Only when necessary    []  Does not take pain medications    3. What are the Patient's Goals/Expectations for Visiting Pain Management? []  Learn about my pain    [x]  Receive Medication   []  Physical Therapy     []  Treat Depression   [x]  Receive Injections    []  Treat Sleep   []  Deal with Anxiety and Stress   []  Treat Opoid Dependence/Addiction   []  Other:      HPI:   Timo Gracia is a 46 y.o. male is here today for    Chief Complaint: Leg pain, low back pain     HPI   FU from LES # 1 @ L5. States that he is receiving 100% relief of right leg pain from procedure- no pain at all. Reports no relief in left leg though as he still has pain in left leg from foot to calf- aching, tingling and numbness. Denies any pain in low back as pain remains tolerable from L-facet MBB. States doing more since procedure, walking better Continues Norco prn and Lyrica and states Charkady Yu works well but Lyrica not helping much       Pain increases with bending, lifting, twisting , walking, standing and laying. Medications reviewed. Patient denies side effects with medications. Patient states he is taking medications as prescribed.  Hedenies receiving pain medications from other sources. He had 1 ER visist for GERD 2 weeks ago     Pain scale with out pain medications or at its worst is 8/10 left leg   Pain scale with pain medications or at its best is 4/10. Last dose of Norco and Lyrica was today     Pill count was not completed today and patient needs to bring in to appointment   The patientis allergic to penicillins. Subjective:      Review of Systems   Musculoskeletal: Positive for arthralgias, back pain, gait problem, joint swelling and myalgias. Negative for neck pain. Ambulating with quad cane    Neurological: Positive for weakness and numbness. Objective:     Vitals:    06/07/22 1105   BP: 138/82   Weight: 286 lb (129.7 kg)   Height: 5' 7.99\" (1.727 m)       Physical Exam  Constitutional:       Appearance: Normal appearance. HENT:      Head: Normocephalic and atraumatic. Right Ear: External ear normal.      Left Ear: External ear normal.      Nose: Nose normal.      Mouth/Throat:      Mouth: Mucous membranes are moist.      Pharynx: Oropharynx is clear. Eyes:      General:         Right eye: No discharge. Extraocular Movements: Extraocular movements intact. Conjunctiva/sclera: Conjunctivae normal.      Pupils: Pupils are equal, round, and reactive to light. Cardiovascular:      Rate and Rhythm: Normal rate and regular rhythm. Pulses: Decreased pulses. Pulmonary:      Effort: Pulmonary effort is normal.      Breath sounds: Normal breath sounds. Abdominal:      General: Abdomen is flat. Bowel sounds are normal.   Musculoskeletal:         General: Tenderness present. Right shoulder: Decreased range of motion. Left shoulder: Decreased range of motion. Right wrist: Decreased range of motion. Right hand: Decreased range of motion. Cervical back: Normal range of motion and neck supple. Bony tenderness present. No tenderness. Lumbar back: Bony tenderness present. No tenderness.  Normal range of motion. Positive left straight leg raise test. Negative right straight leg raise test.        Back:       Right hip: Tenderness and bony tenderness present. Decreased range of motion. Decreased strength. Left hip: Tenderness and bony tenderness present. Decreased range of motion. Decreased strength. Right upper leg: Tenderness and bony tenderness present. Right knee: Swelling present. Decreased range of motion. Tenderness present. Left knee: Swelling present. Decreased range of motion. Tenderness present. Right lower leg: Swelling, tenderness and bony tenderness present. 2+ Pitting Edema present. Left lower leg: Swelling, tenderness and bony tenderness present. 2+ Pitting Edema present. Right ankle: Swelling present. Tenderness present. Decreased range of motion. Left ankle: Swelling present. Tenderness present. Decreased range of motion. Left foot: Decreased range of motion. Tenderness present. Skin:     General: Skin is warm and dry. Neurological:      General: No focal deficit present. Mental Status: He is alert and oriented to person, place, and time. Sensory: Sensory deficit present. Motor: Weakness present. Coordination: Coordination abnormal.      Gait: Gait abnormal.      Deep Tendon Reflexes:      Reflex Scores:       Tricep reflexes are 2+ on the right side and 2+ on the left side. Bicep reflexes are 2+ on the right side and 2+ on the left side. Brachioradialis reflexes are 2+ on the right side and 2+ on the left side. Patellar reflexes are 1+ on the right side and 1+ on the left side. Achilles reflexes are 1+ on the right side and 1+ on the left side. Comments: Strength 4/5 bilateral lower extremities     Numbness and decreased sensation bilateral feet.     Psychiatric:         Mood and Affect: Mood normal.         BIGG  Patricks test  positive  Yeoman's  or Gaenslen's positive       Assessment: 1. Lumbosacral radiculitis    2. Spinal stenosis of lumbar region with neurogenic claudication    3. Lumbar spondylosis    4. Lumbar facet arthropathy    5. Chronic pain syndrome    6. Nerve pain            Plan:      · OARRS reviewed. Current MED: 30.00  · Patient was offered naloxone for home. · Discussed long term side effects of medications, tolerance, dependency and addiction. · Previous UDS reviewed  · UDS preformed today for compliance. · Patient told can not receive any pain medications from any other source. · No evidence of abuse, diversion or aberrant behavior.  Medications and/or procedures to improve function and quality of life- patient understanding with this and that may not be pain free   Discussed with patient about safe storage of medications at home   Discussed possible weaning of medication dosing dependent on treatment/procedure results.  Discussed with patient about risks with procedure including infection, reaction to medication, increased pain, or bleeding.  Procedure notes reviewed in detail    Receiving 100% relief of right leg pain but no relief of left leg from LESI    Continues to receive over 90% relief of low back pain from L-facet MBB # 1   At this time Continue Norco 5/325 TID prn- filled 5/32/2002  · Continue Lyrica from pcp    Updated UDS ordered today    Reviewed lumbar MRI and CT scan again    Ordered neurology referral for bilateral lower extremity EMG and will FU after. Patient also diabetic   Encouraged patient to FU with neurosurgery       Meds. Prescribed:   No orders of the defined types were placed in this encounter. Return for FU after EMG or in 2 months , follow up  for medications.                Electronically signed by SANDRA Reyes CNP on6/7/2022 at 11:44 AM

## 2022-07-05 DIAGNOSIS — M54.17 LUMBOSACRAL RADICULITIS: Primary | ICD-10-CM

## 2022-07-05 DIAGNOSIS — M48.062 SPINAL STENOSIS OF LUMBAR REGION WITH NEUROGENIC CLAUDICATION: ICD-10-CM

## 2022-07-05 DIAGNOSIS — M47.816 LUMBAR SPONDYLOSIS: ICD-10-CM

## 2022-07-05 DIAGNOSIS — G89.4 CHRONIC PAIN SYNDROME: ICD-10-CM

## 2022-07-05 RX ORDER — HYDROCODONE BITARTRATE AND ACETAMINOPHEN 5; 325 MG/1; MG/1
1 TABLET ORAL EVERY 6 HOURS PRN
Qty: 120 TABLET | Refills: 0 | Status: SHIPPED | OUTPATIENT
Start: 2022-07-05 | End: 2022-09-07 | Stop reason: SDUPTHER

## 2022-07-05 NOTE — TELEPHONE ENCOUNTER
OARRS reviewed. UDS: + for  Escitalopram/Citalopram -consistent. + for  Pregabalin -inconsistent  Last seen: 6/7/2022.  Follow-up: 8/15/2022

## 2022-07-05 NOTE — TELEPHONE ENCOUNTER
August Negrete called requesting a refill on the following medications:  Requested Prescriptions     Pending Prescriptions Disp Refills    HYDROcodone-acetaminophen ([address]) 5-325 MG per tablet       Pharmacy verified:   [facility]      Date of last visit: 06-07-22  Date of next visit (if applicable): 4/30/0825

## 2022-07-21 PROBLEM — M62.81 GENERALIZED MUSCLE WEAKNESS: Status: ACTIVE | Noted: 2022-07-21

## 2022-07-21 PROBLEM — M06.9 RHEUMATOID ARTHRITIS (HCC): Status: ACTIVE | Noted: 2022-07-21

## 2022-07-21 PROBLEM — R26.2 DIFFICULTY WALKING: Status: ACTIVE | Noted: 2022-07-21

## 2022-07-24 PROBLEM — R26.2 DIFFICULTY WALKING: Status: RESOLVED | Noted: 2022-07-21 | Resolved: 2022-07-24

## 2022-07-24 PROBLEM — K76.9 LESION OF RIGHT LOBE OF LIVER: Status: RESOLVED | Noted: 2021-11-03 | Resolved: 2022-07-24

## 2022-07-24 PROBLEM — M06.9 RHEUMATOID ARTHRITIS (HCC): Status: RESOLVED | Noted: 2022-07-21 | Resolved: 2022-07-24

## 2022-07-24 PROBLEM — K76.0 HEPATIC STEATOSIS: Status: RESOLVED | Noted: 2021-11-03 | Resolved: 2022-07-24

## 2022-07-24 PROBLEM — M62.81 GENERALIZED MUSCLE WEAKNESS: Status: RESOLVED | Noted: 2022-07-21 | Resolved: 2022-07-24

## 2022-07-24 PROBLEM — D64.9 NORMOCYTIC ANEMIA: Status: RESOLVED | Noted: 2020-02-09 | Resolved: 2022-07-24

## 2022-07-24 PROBLEM — Z79.01 ANTICOAGULATED: Status: RESOLVED | Noted: 2021-11-03 | Resolved: 2022-07-24

## 2022-07-24 PROBLEM — R44.3 HALLUCINATIONS: Status: RESOLVED | Noted: 2020-02-09 | Resolved: 2022-07-24

## 2022-07-24 PROBLEM — M45.9 ANKYLOSING SPONDYLITIS (HCC): Status: ACTIVE | Noted: 2022-07-24

## 2022-07-24 PROBLEM — S31.809A WOUND OF BUTTOCK: Status: RESOLVED | Noted: 2020-02-09 | Resolved: 2022-07-24

## 2022-07-24 PROBLEM — D72.829 LEUKOCYTOSIS: Status: RESOLVED | Noted: 2021-11-03 | Resolved: 2022-07-24

## 2022-07-24 PROBLEM — M45.9 ANKYLOSING SPONDYLITIS (HCC): Chronic | Status: ACTIVE | Noted: 2022-07-24

## 2022-08-08 PROBLEM — R60.0 LOWER LEG EDEMA: Status: ACTIVE | Noted: 2022-08-08

## 2022-08-08 PROBLEM — M54.9 MID BACK PAIN: Status: ACTIVE | Noted: 2022-08-08

## 2022-08-13 PROBLEM — R60.0 LOWER LEG EDEMA: Status: RESOLVED | Noted: 2022-08-08 | Resolved: 2022-08-13

## 2022-08-25 DIAGNOSIS — M54.16 LUMBAR RADICULITIS: Primary | ICD-10-CM

## 2022-08-25 DIAGNOSIS — M45.0 ANKYLOSING SPONDYLITIS OF MULTIPLE SITES IN SPINE (HCC): ICD-10-CM

## 2022-08-25 DIAGNOSIS — M54.9 MID BACK PAIN: ICD-10-CM

## 2022-08-25 DIAGNOSIS — M48.062 SPINAL STENOSIS OF LUMBAR REGION WITH NEUROGENIC CLAUDICATION: ICD-10-CM

## 2022-08-25 RX ORDER — PREGABALIN 75 MG/1
75 CAPSULE ORAL 3 TIMES DAILY
Qty: 90 CAPSULE | Refills: 0 | Status: SHIPPED | OUTPATIENT
Start: 2022-08-25 | End: 2022-10-06

## 2022-08-26 NOTE — TELEPHONE ENCOUNTER
Nursing home pt  Needing rx for lyrica sent to [facility] pharmacy    ASSESSMENT & PLAN   Diagnosis Orders   1. Lumbar radiculitis  pregabalin (LYRICA) 75 MG capsule      2. Mid back pain  pregabalin (LYRICA) 75 MG capsule      3. Spinal stenosis of lumbar region with neurogenic claudication  pregabalin (LYRICA) 75 MG capsule      4. Ankylosing spondylitis of multiple sites in spine (HCC)  pregabalin (LYRICA) 75 MG capsule          OAARS reviewed and appropriate. Controlled Substances Monitoring: Periodic Controlled Substance Monitoring: Possible medication side effects, risk of tolerance/dependence & alternative treatments discussed., No signs of potential drug abuse or diversion identified.  Inder Lauren DO)      Future Appointments   Date Time Provider [facility]   9/7/2022  2:30 PM Beulah Bell MD [facility]   9/19/2022  2:00 PM Jacob Foy MD [facility] [place]   9/27/2022  1:20 PM Roberto Eagle MD [facility] [place]   10/4/2022 11:20 AM Gisela Nicole DO [facility] [place]

## 2022-08-30 NOTE — PLAN OF CARE
Problem: Impaired respiratory status  Goal: Patient will achieve/maintain normal respiratory rate/effort  10/14/2020 0609 by Quang Peters RCP  Outcome: Ongoing  Note: Patient currently on SBT 12/6 and 30%. Will keep patient on SBT until no longer tolerable.       Problem: Impaired respiratory status  Goal: Clear lung sounds  10/14/2020 0609 by Quang Peters RCP  Outcome: Ongoing In order to meet Medicare requirements, the clinical documentation must support the information cited in the admission order.  Please be sure to provide detailed and clear documentation about the following in the admitting note/history and physical:

## 2022-08-31 DIAGNOSIS — I26.99 ACUTE PULMONARY EMBOLISM WITHOUT ACUTE COR PULMONALE, UNSPECIFIED PULMONARY EMBOLISM TYPE (HCC): Primary | ICD-10-CM

## 2022-08-31 DIAGNOSIS — I48.0 PAROXYSMAL ATRIAL FIBRILLATION (HCC): ICD-10-CM

## 2022-08-31 DIAGNOSIS — N17.9 AKI (ACUTE KIDNEY INJURY) (HCC): ICD-10-CM

## 2022-09-01 ENCOUNTER — TELEPHONE (OUTPATIENT)
Dept: PHARMACY | Age: 52
End: 2022-09-01

## 2022-09-01 NOTE — TELEPHONE ENCOUNTER
Referred to [facility]) for Coumadin management by Dr. Kristin Thomas for afib, PE, goal INR 2-3, therapy duration indefinite, initial referral 8/31/22. Received call from [facility], patient is being discharged today. Appt made for 9/6 at 1pm, [facility] to notify patient. Requested MAR, Warfarin dosing history, discharge med list, discharge warfarin instructions, INR history be faxed to [facility].

## 2022-09-06 ENCOUNTER — HOSPITAL ENCOUNTER (OUTPATIENT)
Dept: PHARMACY | Age: 52
Setting detail: THERAPIES SERIES
Discharge: HOME | End: 2022-09-06
Payer: MEDICARE

## 2022-09-06 ENCOUNTER — TELEPHONE (OUTPATIENT)
Dept: FAMILY MEDICINE CLINIC | Age: 52
End: 2022-09-06

## 2022-09-06 ENCOUNTER — HOSPITAL ENCOUNTER (OUTPATIENT)
Age: 52
Discharge: HOME | End: 2022-09-06

## 2022-09-06 DIAGNOSIS — Z79.01 ANTICOAGULATED ON COUMADIN: ICD-10-CM

## 2022-09-06 DIAGNOSIS — I48.91 ATRIAL FIBRILLATION, UNSPECIFIED TYPE (HCC): Primary | ICD-10-CM

## 2022-09-06 DIAGNOSIS — I26.99 PULMONARY EMBOLISM, UNSPECIFIED CHRONICITY, UNSPECIFIED PULMONARY EMBOLISM TYPE, UNSPECIFIED WHETHER ACUTE COR PULMONALE PRESENT (HCC): ICD-10-CM

## 2022-09-06 DIAGNOSIS — Z51.81 ENCOUNTER FOR THERAPEUTIC DRUG MONITORING: ICD-10-CM

## 2022-09-06 LAB
ALBUMIN SERPL-MCNC: 4.3 G/DL (ref 3.5–5.1)
ALP BLD-CCNC: 140 U/L (ref 38–126)
ALT SERPL-CCNC: 52 U/L (ref 11–66)
AST SERPL-CCNC: 44 U/L (ref 5–40)
BILIRUB SERPL-MCNC: 0.5 MG/DL (ref 0.3–1.2)
BILIRUBIN DIRECT: < 0.2 MG/DL (ref 0–0.3)
CREAT SERPL-MCNC: 1 MG/DL (ref 0.4–1.2)
ERYTHROCYTE [DISTWIDTH] IN BLOOD BY AUTOMATED COUNT: 15.2 % (ref 11.5–14.5)
ERYTHROCYTE [DISTWIDTH] IN BLOOD BY AUTOMATED COUNT: 51.3 FL (ref 35–45)
HCT VFR BLD CALC: 42 % (ref 42–52)
HEMOGLOBIN: 13.5 GM/DL (ref 14–18)
MCH RBC QN AUTO: 30 PG (ref 26–33)
MCHC RBC AUTO-ENTMCNC: 32.1 GM/DL (ref 32.2–35.5)
MCV RBC AUTO: 93.3 FL (ref 80–94)
PLATELET # BLD: 249 THOU/MM3 (ref 130–400)
PMV BLD AUTO: 11.1 FL (ref 9.4–12.4)
POC INR: 1.1 (ref 0.8–1.2)
RBC # BLD: 4.5 MILL/MM3 (ref 4.7–6.1)
TOTAL PROTEIN: 7.4 G/DL (ref 6.1–8)
WBC # BLD: 9.4 THOU/MM3 (ref 4.8–10.8)

## 2022-09-06 PROCEDURE — 85027 COMPLETE CBC AUTOMATED: CPT

## 2022-09-06 PROCEDURE — 99213 OFFICE O/P EST LOW 20 MIN: CPT | Performed by: PHARMACIST

## 2022-09-06 PROCEDURE — 85610 PROTHROMBIN TIME: CPT | Performed by: PHARMACIST

## 2022-09-06 PROCEDURE — 82565 ASSAY OF CREATININE: CPT

## 2022-09-06 PROCEDURE — 80076 HEPATIC FUNCTION PANEL: CPT

## 2022-09-06 PROCEDURE — 36416 COLLJ CAPILLARY BLOOD SPEC: CPT | Performed by: PHARMACIST

## 2022-09-06 RX ORDER — ENOXAPARIN SODIUM 150 MG/ML
135 INJECTION SUBCUTANEOUS EVERY 12 HOURS
Qty: 12 ML | Refills: 0 | Status: SHIPPED | OUTPATIENT
Start: 2022-09-06 | End: 2022-09-15

## 2022-09-06 RX ORDER — POLYETHYLENE GLYCOL 3350 17 G/17G
17 POWDER, FOR SOLUTION ORAL EVERY OTHER DAY
COMMUNITY
End: 2022-09-12

## 2022-09-06 RX ORDER — AMLODIPINE BESYLATE 10 MG/1
10 TABLET ORAL DAILY
COMMUNITY
Start: 2022-09-02 | End: 2022-09-21 | Stop reason: SDUPTHER

## 2022-09-06 RX ORDER — WARFARIN SODIUM 5 MG/1
TABLET ORAL
COMMUNITY
Start: 2022-09-02 | End: 2022-10-17 | Stop reason: SDUPTHER

## 2022-09-06 RX ORDER — DILTIAZEM HYDROCHLORIDE 60 MG/1
60 TABLET, FILM COATED ORAL 2 TIMES DAILY
COMMUNITY
Start: 2022-09-02 | End: 2022-09-21 | Stop reason: SDUPTHER

## 2022-09-06 RX ORDER — INSULIN GLARGINE 100 [IU]/ML
25 INJECTION, SOLUTION SUBCUTANEOUS NIGHTLY
COMMUNITY
Start: 2022-09-02 | End: 2022-09-23 | Stop reason: ALTCHOICE

## 2022-09-06 RX ORDER — ESCITALOPRAM OXALATE 20 MG/1
20 TABLET ORAL DAILY
COMMUNITY
Start: 2022-09-02 | End: 2022-09-16

## 2022-09-06 NOTE — PROGRESS NOTES
Medication Management [facility]  [address] [place] [address]  [phone] (phone)  [phone] (fax)     Patient presents to the Anticoagulation clinic today for assessment and initial dosing of warfarin. Patient has a diagnosis of PE, Afib and has been placed on Coumadin with a goal INR 2-3. Pt started Coumadin unknown date 8/12/22. Luke Dupont was given full education today in the clinic including written materials, supplemental oral instructions, and all questions were answered. Specifically, Anup Gonzalez was instructed to notfiy the Anticoagulation clinic immediately if there is any unusual bleeding, such as throwing or coughing up blood, bleeding from the nose, mouth, ears, or pink-red tinge in the urine or if the stools contain bright red blood or are black and tarry. In addition, Anup Gonzalez was informed to notify the clinic about any and all medicine changes, including prescriptions, over-the-counter or nonprescription medicines, vitamins, herbs, supplements, creams, rubs, eye or ear drops, and injections, whether to be used short- or long-term, within 24 hours. The patient was also instructed to let all other physicians and pharmacists know that warfarin was started as a double-check against drug interactions. The patient was further instructed on the effects of vitamin K containing foods on warfarin and the importance of consistency was stressed. Anup Gonzalez was also advised to avoid large amounts of alcohol, grapefruit juice or cranberry juice while on warfarin. HPI:    Medication changes: patient unable to verify med list. States \"I have so many medications, I have no clue what they are\". Pt instructed to bring detailed med list to next visit. F discharge med list faxed to [facility] after patient left, list updated.    Tablet strength per patient: patient thinks he has 5mg and 10mg tablet at home, upon review of dispense records, 5mg tablet was dispensed at [facility] last week.   Patient reported dosing regimen over last 1 week: does not know. States he was discharged from [facility] on Friday 9/2, DID NOT take any warfarin since being at home  Missed doses in the last 1-2 weeks: yes  Extra doses in the last 1-2 weeks: unknown  Any problems with bleeding/bruising? No  Any recent falls? Yes. Discussed importance of ER evaluation for any head injury. Any signs or symptoms of DVT/PE or stroke? No  Alcohol use: none  Tobacco use: none  Diet changes as follows: No changes   Green leafy intake: discussed being consistent   Grapefruit/cranberry juice: discussed avoid  Upcoming surgeries or procedures: none    Patient states discharged from [facility] on 9/2. States took NO COUMADIN, didn't know how much to take. Alco States that he has Coumadin tablets at home, but had no instructions. States that he was admitted at [facility] 2 weeks ago, had DVT and PE on that admission. Very limited information available. [address] [place] [address], spoke to [place], she is going to gather info to fax to this office. Called [facility], spoke to a very helpful pharmacist.  She shared that patient was adm 8/11/22 for PE/DVT. Pt was discharged on Eliquis (had been on PTA for afib with suspected noncompliance). Pt readmitted to [facility] on 8/23-8/24. Was found to have been switched to Lovenox/Coumadin on 8/12 due to potential Eliquis failure. Also spoke to Dr. Dima Bennett, who agreed with start Lovenox today 9/6/22. Following reported doses at [facility] as per [facility] med list review and as per faxed information received from [facility]. Incomplete MAR faxed, unable to confirm dose administration.      Date Warfarin dose  INR  8/12   10mg       8/13  5mg  8/14 5mg  8/15 5mg   1.4  8/16  5mg  8/17   10mg   1.6  8/18 5.5mg  8/19-8/21 - missed doses at [facility] per [facility]?  8/22   10mg   1.3  8/23   10mg (at [facility])  8/24  10mg (at [facility])  8/25 5mg   1.8  8/29 Hold   8.16  8/30 4.5mg   1.5  9/1 5mg   1.1       Assessment  Lab Results Component Value Date    INR 1.10 09/06/2022    INR 1.14 (H) 11/07/2020    INR 1.24 (H) 10/25/2020     INR subtherapeutic   Recent Labs     09/06/22  1315   INR 1.10         Plan  Lovenox 135mg SQ Q12h, Rx sent to pharmacy. Patient educated on injection technique. Also called [address] [place] [address], [facility] notifying them of new instructions. Coumadin 10mg today, 5mg Wed and Thurs. Recheck INR 3 days. Patient reminded to call the Anticoagulation Clinic with any signs or symptoms of bleeding or with any medication changes. Patient given instructions utilizing the teach back method.  Printed patient teaching/instruction included with AVS.       For Pharmacy [address] Only    Intervention Detail: Dose Adjustment: 2, reason: Therapy Optimization, Lab(s) Ordered, and New Rx: 1, reason: Needs Additional Therapy  Total # of Interventions Recommended: 4  Total # of Interventions Accepted: 4  Time Spent (min):  90

## 2022-09-06 NOTE — TELEPHONE ENCOUNTER
Spoke to Ezra Davis  Pt did not take her coumadin over the weekend  Showed her in [facility] where my notes are located for future reference   Clarified was on lovenx prior and recency of PE/DVT as well as   They will manage lovenox bridge and coumadin. yes

## 2022-09-06 NOTE — TELEPHONE ENCOUNTER
Morgan Cavazos from [facility] medication management  ([id number]) calling stating that the patients INR today is 1.1 patient states that he had a PE 2 weeks ago   Morgan Cavazos states that the patient needs a Lovenox bridge for PE DVT but she does not have anything to go on.  (D/C notes, lab results)  Please advise   Morgan Cavazos can be reached at [phone]

## 2022-09-07 DIAGNOSIS — M54.17 LUMBOSACRAL RADICULITIS: ICD-10-CM

## 2022-09-07 DIAGNOSIS — G89.4 CHRONIC PAIN SYNDROME: ICD-10-CM

## 2022-09-07 DIAGNOSIS — M48.062 SPINAL STENOSIS OF LUMBAR REGION WITH NEUROGENIC CLAUDICATION: ICD-10-CM

## 2022-09-07 DIAGNOSIS — M47.816 LUMBAR SPONDYLOSIS: ICD-10-CM

## 2022-09-07 RX ORDER — HYDROCODONE BITARTRATE AND ACETAMINOPHEN 5; 325 MG/1; MG/1
1 TABLET ORAL EVERY 6 HOURS PRN
Qty: 120 TABLET | Refills: 0 | Status: SHIPPED | OUTPATIENT
Start: 2022-09-07 | End: 2022-10-07 | Stop reason: SDUPTHER

## 2022-09-07 NOTE — TELEPHONE ENCOUNTER
Zita Mahoney called requesting a refill on the following medications:  Requested Prescriptions     Pending Prescriptions Disp Refills    HYDROcodone-acetaminophen (NORCO) 5-325 MG per tablet 120 tablet 0     Sig: Take 1 tablet by mouth every 6 hours as needed for Pain for up to 30 days.      Pharmacy verified:   [facility]      Date of last visit: 06-07-22   Date of next visit (if applicable): 9/07/1479

## 2022-09-07 NOTE — TELEPHONE ENCOUNTER
OARRS reviewed. UDS: + for  . Lexapro, hydrocodone, aripiprazole  Last seen: 6/7/2022.  Follow-up:   Future Appointments   Date Time Provider Anamika Lyndsey   9/9/2022  9:40 AM RERE Carr [facility]   9/14/2022  7:30 AM SANDRA Alva - CNP [facility] [address]   9/16/2022  9:00 AM Daniel [facility] [address]   9/19/2022  2:00 PM Zo James MD [facility] [address]   9/27/2022  1:20 PM [facility]   10/4/2022 11:20 AM Jorge Armendariz DO [facility]um [address]

## 2022-09-09 ENCOUNTER — HOSPITAL ENCOUNTER (OUTPATIENT)
Dept: PHARMACY | Age: 52
Setting detail: THERAPIES SERIES
Discharge: HOME | End: 2022-09-09
Payer: MEDICARE

## 2022-09-09 DIAGNOSIS — I26.99 PULMONARY EMBOLISM, UNSPECIFIED CHRONICITY, UNSPECIFIED PULMONARY EMBOLISM TYPE, UNSPECIFIED WHETHER ACUTE COR PULMONALE PRESENT (HCC): ICD-10-CM

## 2022-09-09 DIAGNOSIS — I48.91 ATRIAL FIBRILLATION, UNSPECIFIED TYPE (HCC): Primary | ICD-10-CM

## 2022-09-09 DIAGNOSIS — Z51.81 ENCOUNTER FOR THERAPEUTIC DRUG MONITORING: ICD-10-CM

## 2022-09-09 DIAGNOSIS — Z79.01 ANTICOAGULATED ON COUMADIN: ICD-10-CM

## 2022-09-09 LAB — POC INR: 1.5 (ref 0.8–1.2)

## 2022-09-09 PROCEDURE — 85610 PROTHROMBIN TIME: CPT | Performed by: PHARMACIST

## 2022-09-09 PROCEDURE — 36416 COLLJ CAPILLARY BLOOD SPEC: CPT | Performed by: PHARMACIST

## 2022-09-09 PROCEDURE — 99212 OFFICE O/P EST SF 10 MIN: CPT | Performed by: PHARMACIST

## 2022-09-09 NOTE — PROGRESS NOTES
Medication Management [address]  [phone] (phone)  [phone] (fax)    Mr. Kathy Olmedo is a 46 y.o.  male with history of DVT, PE, Afib who presents today for anticoagulation monitoring and adjustment. States he only had the 10mg tablet at home, so he took 10mg daily. Patient denies s/s bleeding/bruising/swelling/SOB/chest pain  No blood in urine or stool. No dietary changes. Currently looking for housing, living at the [facility], diet is not consistent. No changes in medication/OTC agents/Herbals. Did not bring med list as requested at last visit. Again instructed pt to bring detailed med to next visit. No change in alcohol use or tobacco use. Activity level slowing improving. Patient denies headaches/dizziness/lightheadedness/falls. No vomiting/diarrhea or acute illness. No Procedures scheduled in the future at this time. Currently bridging with Lovenox, last dose given this am, has enough doses left until Monday. Assessment:   Lab Results   Component Value Date    INR 1.50 (H) 09/09/2022    INR 1.10 09/06/2022    INR 1.14 (H) 11/07/2020     INR subtherapeutic   Recent Labs     09/09/22  0946   INR 1.50*     Updated referral received from IRIS Rodriguez CNP. Per chart review, 5mg tablets dispensed from [employer] on 9/2. Pt states he has a \"big bag of meds\" at home, he will look for 5mg tablet. Plan:  Continue Lovenox 135mg SQ q12h. Coumadin 5mg daily x 3 days. Recheck INR in 3 day(s). Patient reminded to call the Anticoagulation Clinic with any signs or symptoms of bleeding or with any medication changes. Patient given instructions utilizing the teach back method. After visit summary printed and reviewed with patient. Discharged ambulatory in no apparent distress.     For Pharmacy Admin Tracking Only    Intervention Detail: Dose Adjustment: 1, reason: Therapy Optimization  Total # of Interventions Recommended: 1  Total # of Interventions Accepted: 1  Time Spent (min): 20

## 2022-09-12 ENCOUNTER — APPOINTMENT (OUTPATIENT)
Dept: CT IMAGING | Age: 52
End: 2022-09-12
Payer: MEDICARE

## 2022-09-12 ENCOUNTER — HOSPITAL ENCOUNTER (EMERGENCY)
Age: 52
Discharge: HOME | End: 2022-09-12
Attending: EMERGENCY MEDICINE
Payer: MEDICARE

## 2022-09-12 ENCOUNTER — APPOINTMENT (OUTPATIENT)
Dept: GENERAL RADIOLOGY | Age: 52
End: 2022-09-12
Payer: MEDICARE

## 2022-09-12 ENCOUNTER — HOSPITAL ENCOUNTER (OUTPATIENT)
Dept: PHARMACY | Age: 52
Setting detail: THERAPIES SERIES
Discharge: HOME | End: 2022-09-12
Payer: MEDICARE

## 2022-09-12 VITALS
HEART RATE: 69 BPM | HEIGHT: 68 IN | RESPIRATION RATE: 20 BRPM | TEMPERATURE: 98.1 F | SYSTOLIC BLOOD PRESSURE: 158 MMHG | BODY MASS INDEX: 43.5 KG/M2 | OXYGEN SATURATION: 94 % | WEIGHT: 287 LBS | DIASTOLIC BLOOD PRESSURE: 113 MMHG

## 2022-09-12 DIAGNOSIS — I48.91 ATRIAL FIBRILLATION, UNSPECIFIED TYPE (HCC): Primary | ICD-10-CM

## 2022-09-12 DIAGNOSIS — I27.82 CHRONIC PULMONARY EMBOLISM WITHOUT ACUTE COR PULMONALE, UNSPECIFIED PULMONARY EMBOLISM TYPE (HCC): Primary | ICD-10-CM

## 2022-09-12 DIAGNOSIS — Z79.01 ANTICOAGULATED ON COUMADIN: ICD-10-CM

## 2022-09-12 DIAGNOSIS — I26.99 PULMONARY EMBOLISM, UNSPECIFIED CHRONICITY, UNSPECIFIED PULMONARY EMBOLISM TYPE, UNSPECIFIED WHETHER ACUTE COR PULMONALE PRESENT (HCC): ICD-10-CM

## 2022-09-12 DIAGNOSIS — Z51.81 ENCOUNTER FOR THERAPEUTIC DRUG MONITORING: ICD-10-CM

## 2022-09-12 LAB
ANION GAP SERPL CALCULATED.3IONS-SCNC: 13 MEQ/L (ref 8–16)
APTT: 52.8 SECONDS (ref 22–38)
BASOPHILS # BLD: 0.5 %
BASOPHILS ABSOLUTE: 0 THOU/MM3 (ref 0–0.1)
BUN BLDV-MCNC: 15 MG/DL (ref 7–22)
CALCIUM SERPL-MCNC: 9.7 MG/DL (ref 8.5–10.5)
CHLORIDE BLD-SCNC: 105 MEQ/L (ref 98–111)
CO2: 26 MEQ/L (ref 23–33)
CREAT SERPL-MCNC: 1 MG/DL (ref 0.4–1.2)
EOSINOPHIL # BLD: 1.8 %
EOSINOPHILS ABSOLUTE: 0.2 THOU/MM3 (ref 0–0.4)
ERYTHROCYTE [DISTWIDTH] IN BLOOD BY AUTOMATED COUNT: 15.5 % (ref 11.5–14.5)
ERYTHROCYTE [DISTWIDTH] IN BLOOD BY AUTOMATED COUNT: 50.5 FL (ref 35–45)
GLUCOSE BLD-MCNC: 247 MG/DL (ref 70–108)
HCT VFR BLD CALC: 43 % (ref 42–52)
HEMOGLOBIN: 14.5 GM/DL (ref 14–18)
IMMATURE GRANS (ABS): 0.26 THOU/MM3 (ref 0–0.07)
IMMATURE GRANULOCYTES: 2.6 %
INR BLD: 2.01 (ref 0.85–1.13)
LYMPHOCYTES # BLD: 15.4 %
LYMPHOCYTES ABSOLUTE: 1.5 THOU/MM3 (ref 1–4.8)
MCH RBC QN AUTO: 30.7 PG (ref 26–33)
MCHC RBC AUTO-ENTMCNC: 33.7 GM/DL (ref 32.2–35.5)
MCV RBC AUTO: 90.9 FL (ref 80–94)
MONOCYTES # BLD: 6.6 %
MONOCYTES ABSOLUTE: 0.7 THOU/MM3 (ref 0.4–1.3)
NUCLEATED RED BLOOD CELLS: 0 /100 WBC
OSMOLALITY CALCULATION: 295.9 MOSMOL/KG (ref 275–300)
PLATELET # BLD: 237 THOU/MM3 (ref 130–400)
PMV BLD AUTO: 11 FL (ref 9.4–12.4)
POC INR: 2 (ref 0.8–1.2)
POTASSIUM SERPL-SCNC: 4 MEQ/L (ref 3.5–5.2)
PRO-BNP: 73.7 PG/ML (ref 0–900)
RBC # BLD: 4.73 MILL/MM3 (ref 4.7–6.1)
SARS-COV-2, NAAT: NOT  DETECTED
SEG NEUTROPHILS: 73.1 %
SEGMENTED NEUTROPHILS ABSOLUTE COUNT: 7.2 THOU/MM3 (ref 1.8–7.7)
SODIUM BLD-SCNC: 144 MEQ/L (ref 135–145)
TROPONIN T: < 0.01 NG/ML
WBC # BLD: 9.9 THOU/MM3 (ref 4.8–10.8)

## 2022-09-12 PROCEDURE — 85730 THROMBOPLASTIN TIME PARTIAL: CPT

## 2022-09-12 PROCEDURE — 36416 COLLJ CAPILLARY BLOOD SPEC: CPT | Performed by: PHARMACIST

## 2022-09-12 PROCEDURE — 85610 PROTHROMBIN TIME: CPT | Performed by: PHARMACIST

## 2022-09-12 PROCEDURE — 93005 ELECTROCARDIOGRAM TRACING: CPT | Performed by: EMERGENCY MEDICINE

## 2022-09-12 PROCEDURE — 6360000004 HC RX CONTRAST MEDICATION: Performed by: EMERGENCY MEDICINE

## 2022-09-12 PROCEDURE — 83880 ASSAY OF NATRIURETIC PEPTIDE: CPT

## 2022-09-12 PROCEDURE — 99285 EMERGENCY DEPT VISIT HI MDM: CPT

## 2022-09-12 PROCEDURE — 71275 CT ANGIOGRAPHY CHEST: CPT

## 2022-09-12 PROCEDURE — 80048 BASIC METABOLIC PNL TOTAL CA: CPT

## 2022-09-12 PROCEDURE — 87635 SARS-COV-2 COVID-19 AMP PRB: CPT

## 2022-09-12 PROCEDURE — 71046 X-RAY EXAM CHEST 2 VIEWS: CPT

## 2022-09-12 PROCEDURE — 99211 OFF/OP EST MAY X REQ PHY/QHP: CPT | Performed by: PHARMACIST

## 2022-09-12 PROCEDURE — 84484 ASSAY OF TROPONIN QUANT: CPT

## 2022-09-12 PROCEDURE — 85025 COMPLETE CBC W/AUTO DIFF WBC: CPT

## 2022-09-12 PROCEDURE — 85610 PROTHROMBIN TIME: CPT

## 2022-09-12 PROCEDURE — 36415 COLL VENOUS BLD VENIPUNCTURE: CPT

## 2022-09-12 RX ORDER — VITAMIN E 268 MG
400 CAPSULE ORAL DAILY
COMMUNITY

## 2022-09-12 RX ORDER — VITAMIN A 10000 UNIT
3 TABLET ORAL DAILY
COMMUNITY

## 2022-09-12 RX ADMIN — IOPAMIDOL 80 ML: 755 INJECTION, SOLUTION INTRAVENOUS at 18:50

## 2022-09-12 ASSESSMENT — ENCOUNTER SYMPTOMS
COUGH: 0
DIARRHEA: 0
WHEEZING: 0
ABDOMINAL DISTENTION: 0
VOMITING: 0
SORE THROAT: 0
CONSTIPATION: 0
BLOOD IN STOOL: 0
VOICE CHANGE: 0
TROUBLE SWALLOWING: 0
EYE REDNESS: 0
CHOKING: 0
SINUS PRESSURE: 0
PHOTOPHOBIA: 0
BACK PAIN: 0
NAUSEA: 0
ABDOMINAL PAIN: 0
EYE DISCHARGE: 0
SHORTNESS OF BREATH: 1
RHINORRHEA: 0
EYE PAIN: 0
CHEST TIGHTNESS: 0
EYE ITCHING: 0

## 2022-09-12 ASSESSMENT — PAIN DESCRIPTION - LOCATION: LOCATION: CHEST

## 2022-09-12 ASSESSMENT — PAIN - FUNCTIONAL ASSESSMENT: PAIN_FUNCTIONAL_ASSESSMENT: 0-10

## 2022-09-12 ASSESSMENT — PAIN DESCRIPTION - ORIENTATION: ORIENTATION: MID

## 2022-09-12 ASSESSMENT — PAIN SCALES - GENERAL: PAINLEVEL_OUTOF10: 5

## 2022-09-12 NOTE — ED NOTES
Patient presents to the ED with complaints of being short of breath and having mid chest pain. He states that he was previously diagnosed with having a blood clot in his left leg and his lungs. He has no other complaints at this time.       Urmila Valencia, ANNE  33/[birth year]91 [id number]

## 2022-09-12 NOTE — PROGRESS NOTES
Medication Management [address]   [phone] (phone)  [phone] (fax)    Mr. Clarence Pittman is a 46 y.o.  male with history of PE, Afib who presents today for anticoagulation monitoring and adjustment. Patient verifies current dosing regimen and tablet strength. No missed or extra doses. Patient denies s/s bleeding/bruising/swelling/chest pain States he is \"gasping for air\" with any movement, especially when using his cane. States this is better when using his rolling walker. Patient has not reviewed this with his PCP or home health RN. Patient called [facility] RN while at this appt, she recommends ER evaluation. No blood in urine or stool. No dietary changes. No changes in OTC agents/Herbals. Updated med list received from [facility] via fax. Has Lexapro and Celexa listed, asked pt to clarify these. He also has PCP appt tomorrow, will review w/ PCP. No change in alcohol use or tobacco use. No change in activity level. Patient denies headaches/dizziness/lightheadedness/falls. No vomiting/diarrhea or acute illness. No Procedures scheduled in the future at this time. Assessment:   Lab Results   Component Value Date    INR 2.00 (H) 09/12/2022    INR 1.50 (H) 09/09/2022    INR 1.10 09/06/2022     INR therapeutic   Recent Labs     09/12/22  1437   INR 2.00*       Patient has recent DVT/PE diagnosed in August. First visit with Med Mgmt was 9/6/22, INR was 1.1 as he was recently discharged from [facility] and was not taking any warfarin/Lovenox. See visit note from 9/6/22. Patient has received 45mg warfarin over the past 7 days. Confirms that he has one more syringe of Lovenox at home. Confirms that he has warfarin 5mg and 10mg tablets at home. Plan:  Continue Lovenox 135mg SQ Q12hr x 1 more dose tonight. Coumadin 7.5mg today, 5mg tomorrow, 7.5mg Wed, 5mg Thurs. Recheck INR in 4 day(s).   Patient reminded to call the Anticoagulation Clinic with any signs or symptoms of bleeding or with any medication changes. Patient given instructions utilizing the teach back method. After visit summary printed and reviewed with patient.       Discharged WC in no apparent distress to ER for evaluation per patient request.    For Pharmacy Admin Tracking Only    Intervention Detail: Dose Adjustment: 1, reason: Therapy Optimization  Total # of Interventions Recommended: 1  Total # of Interventions Accepted: 1  Time Spent (min): 20

## 2022-09-12 NOTE — PATIENT INSTRUCTIONS
Lovenox 135mg injected into the skin tonight, then stop. Check medication bottles - do you take Citalopram (Celexa) or Escitalopram (Lexapro)? Both are on the list from [facility].

## 2022-09-12 NOTE — ED PROVIDER NOTES
[facility]  eMERGENCY dEPARTMENT eNCOUnter          CHIEF COMPLAINT       Chief Complaint   Patient presents with    Shortness of Breath    Chest Pain       Nurses Notes reviewed and I agree except as noted in the HPI. HISTORY OF PRESENT ILLNESS    Jarvis Alcala is a 46 y.o. male who presents for increasing shortness of breath. Apparently the patient was seen at [facility] approximately 1 month ago and was diagnosed with bilateral PEs. He is on Coumadin and he is on Lovenox. He is waiting for the bridge to occur. He states that when he is walking he has noticed increased shortness of breath. Nobody warned him that it would be that way after the PE. Patient is here for evaluation to be checked out to make sure that it is not worsening. As stated above the patient still is on Lovenox and he is on Coumadin. Patient is denying any acute chest pain. Patient has bilateral lower extremity swelling which is common for him. Patient does not see a cardiologist.  Patient is otherwise resting comfortably on the cot no apparent distress no other physical complaints at this time. REVIEW OF SYSTEMS     Review of Systems   Constitutional:  Negative for activity change, appetite change, diaphoresis, fatigue and unexpected weight change. HENT:  Negative for congestion, ear discharge, ear pain, hearing loss, rhinorrhea, sinus pressure, sore throat, trouble swallowing and voice change. Eyes:  Negative for photophobia, pain, discharge, redness and itching. Respiratory:  Positive for shortness of breath. Negative for cough, choking, chest tightness and wheezing. Cardiovascular:  Positive for leg swelling. Negative for chest pain and palpitations. Gastrointestinal:  Negative for abdominal distention, abdominal pain, blood in stool, constipation, diarrhea, nausea and vomiting. Endocrine: Negative for polydipsia, polyphagia and polyuria.    Genitourinary:  Negative for decreased urine volume, difficulty urinating, dysuria, enuresis, frequency, hematuria and urgency. Musculoskeletal:  Negative for arthralgias, back pain, gait problem, myalgias, neck pain and neck stiffness. Skin:  Negative for pallor and rash. Allergic/Immunologic: Negative for immunocompromised state. Neurological:  Negative for dizziness, tremors, seizures, syncope, facial asymmetry, weakness, light-headedness, numbness and headaches. Hematological:  Negative for adenopathy. Does not bruise/bleed easily. Psychiatric/Behavioral:  Negative for agitation, hallucinations and suicidal ideas. The patient is not nervous/anxious. PAST MEDICAL HISTORY    has a past medical history of Ankylosing spondylitis ([address] Aortic stenosis, mild to moderate, Atrial fibrillation ([address] BPH (benign prostatic hyperplasia), Carpal tunnel syndrome on right, Chronic gout, COVID-19, DM2 (diabetes mellitus, type 2) ([address] Dyslipidemia, GERD (gastroesophageal reflux disease), Heart failure with preserved ejection fraction ([address] History of alcohol abuse, History of osteomyelitis, Hypertension, essential, Hypogonadism, male, Major depression, Mood disorder ([address] Morbid obesity ([address] DURAN (nonalcoholic steatohepatitis), KIARA treated with BiPAP, and Vitamin D deficiency. SURGICAL HISTORY      has a past surgical history that includes tracheostomy; Foot surgery (Right); hip surgery (Left, 6/29/2020); sigmoidoscopy (N/A, 10/26/2020); Upper gastrointestinal endoscopy (N/A, 11/14/2020); Colonoscopy (N/A, 11/15/2020); Tonsillectomy; Upper gastrointestinal endoscopy (N/A, 12/27/2021); Endoscopy, colon, diagnostic; Pain management procedure (Bilateral, 4/11/2022); and Pain management procedure (N/A, 5/23/2022).     CURRENT MEDICATIONS       Discharge Medication List as of 9/12/2022  7:36 PM        CONTINUE these medications which have NOT CHANGED    Details   vitamin E 400 UNIT capsule Take 400 Units by mouth dailyHistorical Med      Vitamin A 3 MG (19324 UT) TABS Take 3 mg by mouth dailyHistorical Med      HYDROcodone-acetaminophen (NORCO) 5-325 MG per tablet Take 1 tablet by mouth every 6 hours as needed for Pain for up to 30 days. , Disp-120 tablet, R-0Normal      enoxaparin (LOVENOX) 150 MG/ML SOSY injection Inject 0.9 mLs into the skin in the morning and 0.9 mLs in the evening., Disp-12 mL, R-0Normal      amLODIPine (NORVASC) 10 MG tablet Take 10 mg by mouth dailyHistorical Med      dilTIAZem (CARDIZEM) 60 MG tablet Take 60 mg by mouth 2 times dailyHistorical Med      escitalopram (LEXAPRO) 20 MG tablet Take 20 mg by mouth dailyHistorical Med      BASAGLAR KWIKPEN 100 UNIT/ML injection pen Inject 25 Units into the skin nightly, DAWHistorical Med      warfarin (COUMADIN) 5 MG tablet Historical Med      pregabalin (LYRICA) 75 MG capsule Take 1 capsule by mouth 3 times daily for 30 days. , Disp-90 capsule, R-0Normal      allopurinol (ZYLOPRIM) 300 MG tablet Take 1 tablet by mouth daily, Disp-90 tablet, R-0Normal      hydrALAZINE (APRESOLINE) 50 MG tablet Take 1 tablet by mouth 3 times daily, Disp-90 tablet, R-3Normal      vitamin C (ASCORBIC ACID) 500 MG tablet Take 2 tablets by mouth daily, Disp-30 tablet,R-0Normal      CHOLECALCIFEROL PO Take 2,000 Units by mouth dailyHistorical Med      atorvastatin (LIPITOR) 40 MG tablet Take 40 mg by mouth nightlyHistorical Med      acetaminophen (TYLENOL) 325 MG tablet Take 650 mg by mouth every 4 hours as needed for PainHistorical Med      busPIRone (BUSPAR) 10 MG tablet Take 10 mg by mouth 2 times daily Historical Med      Multiple Vitamins-Minerals (THERAPEUTIC MULTIVITAMIN-MINERALS) tablet Take 1 tablet by mouth dailyHistorical Med      Probiotic Product (SOBIA-BID PROBIOTIC PO) Take 1 tablet by mouth daily Historical Med      furosemide (LASIX) 20 MG tablet Take 20 mg by mouth dailyHistorical Med      sitaGLIPtan-metFORMIN (JANUMET)  MG per tablet Take 1 tablet by mouth 2 times daily (with meals) Historical Med      ARIPiprazole (ABILIFY PO) Take 15 mg by mouth daily Historical Med      Blood Glucose Monitoring Suppl (FREESTYLE LITE) ARTIE Disp-1 Device, R-11, NormalPatient needs all supplies for qd testing. DX: 250.00             ALLERGIES     is allergic to penicillins. FAMILY HISTORY     He indicated that his mother is . He indicated that his father is alive. He indicated that his brother is alive. He indicated that his daughter is alive. He indicated that both of his sons are alive. He indicated that the status of his paternal aunt is unknown.   family history includes Cancer in his paternal aunt; Heart Disease in his mother. SOCIAL HISTORY      reports that he has never smoked. He has never used smokeless tobacco. He reports that he does not currently use alcohol. He reports that he does not use drugs. PHYSICAL EXAM     INITIAL VITALS:  height is 5' 8\" (1.727 m) and weight is 287 lb (130.2 kg). His oral temperature is 98.1 °F (36.7 °C). His blood pressure is 158/113 (abnormal) and his pulse is 69. His respiration is 20 and oxygen saturation is 94%. Physical Exam  Vitals and nursing note reviewed. Constitutional:       General: He is not in acute distress. Appearance: He is well-developed. He is not diaphoretic. HENT:      Head: Normocephalic and atraumatic. Right Ear: External ear normal.      Left Ear: External ear normal.      Nose: Nose normal.   Eyes:      General: Lids are normal. No scleral icterus. Right eye: No discharge. Left eye: No discharge. Conjunctiva/sclera: Conjunctivae normal.      Right eye: No exudate. Left eye: No exudate. Pupils: Pupils are equal, round, and reactive to light. Neck:      Thyroid: No thyromegaly. Vascular: No JVD. Trachea: No tracheal deviation. Cardiovascular:      Rate and Rhythm: Normal rate and regular rhythm. Pulses: Normal pulses.            Carotid pulses are 2+ on the right side and 2+ on the left side. Radial pulses are 2+ on the right side and 2+ on the left side. Femoral pulses are 2+ on the right side and 2+ on the left side. Popliteal pulses are 2+ on the right side and 2+ on the left side. Dorsalis pedis pulses are 2+ on the right side and 2+ on the left side. Posterior tibial pulses are 2+ on the right side and 2+ on the left side. Heart sounds: Normal heart sounds, S1 normal and S2 normal. No murmur heard. No friction rub. No gallop. Pulmonary:      Effort: Pulmonary effort is normal. No respiratory distress. Breath sounds: Normal breath sounds. No stridor. No decreased breath sounds, wheezing, rhonchi or rales. Chest:      Chest wall: No mass, deformity, tenderness, crepitus or edema. There is no dullness to percussion. Abdominal:      General: Bowel sounds are normal. There is no distension. Palpations: Abdomen is soft. There is no mass. Tenderness: There is no abdominal tenderness. There is no right CVA tenderness, left CVA tenderness, guarding or rebound. Negative signs include Lambert's sign, Rovsing's sign, McBurney's sign, psoas sign and obturator sign. Hernia: No hernia is present. Musculoskeletal:         General: No tenderness. Normal range of motion. Cervical back: Normal range of motion and neck supple. Normal range of motion. Right lower le+ Pitting Edema present. Left lower le+ Pitting Edema present. Lymphadenopathy:      Cervical: No cervical adenopathy. Skin:     General: Skin is warm and dry. Capillary Refill: Capillary refill takes less than 2 seconds. Findings: No bruising, ecchymosis, lesion or rash. Neurological:      General: No focal deficit present. Mental Status: He is alert. He is disoriented. Cranial Nerves: No cranial nerve deficit. Sensory: No sensory deficit.       Coordination: Coordination normal.      Deep Tendon Reflexes: Reflexes are normal and symmetric. Psychiatric:         Speech: Speech normal.         Behavior: Behavior normal.         Thought Content: Thought content normal.         Judgment: Judgment normal.         DIFFERENTIAL DIAGNOSIS:   Worsening PEs, pneumonia bronchitis COVID    DIAGNOSTIC RESULTS     EKG: All EKG's are interpreted by the Emergency Department Physician who either signs or Co-signs this chart in the absence of a cardiologist.  Normal sinus rhythm, normal axis, occasional PVCs, ventricular rate of 70 NM interval 160 QRS duration 82 QT interval 414 QTC of 447. RADIOLOGY: non-plain film images(s) such as CT, Ultrasound and MRI are read by the radiologist.  CTA CHEST W WO CONTRAST   Final Result   Impression:   Small pulmonary embolus within the left lower lobe subsegmental branch. This document has been electronically signed by: Yong Nunez MD on    09/12/2022 07:25 PM      All CTs at this facility use dose modulation techniques and iterative    reconstructions, and/or weight-based dosing   when appropriate to reduce radiation to a low as reasonably achievable. 3D Post-processing was performed on this study. XR CHEST (2 VW)   Final Result   Stable radiographic appearance of the chest. No evidence of an acute process. **This report has been created using voice recognition software. It may contain minor errors which are inherent in voice recognition technology. **      Final report electronically signed by Dr. Juan Manuel Olivas on 9/12/2022 4:28 PM            LABS:   Labs Reviewed   CBC WITH AUTO DIFFERENTIAL - Abnormal; Notable for the following components:       Result Value    RDW-CV 15.5 (*)     RDW-SD 50.5 (*)     Immature Grans (Abs) 0.26 (*)     All other components within normal limits   BASIC METABOLIC PANEL - Abnormal; Notable for the following components:    Glucose 247 (*)     All other components within normal limits   PROTIME-INR - Abnormal; Notable for the following components:    INR 2.01 (*)     All other components within normal limits   APTT - Abnormal; Notable for the following components:    aPTT 52.8 (*)     All other components within normal limits   COVID-19, RAPID   TROPONIN   BRAIN NATRIURETIC PEPTIDE   ANION GAP   OSMOLALITY   GLOMERULAR FILTRATION RATE, ESTIMATED       EMERGENCY DEPARTMENT COURSE:   Vitals:    Vitals:    09/12/22 1529 09/12/22 1752 09/12/22 1913   BP: 136/85 (!) 172/105 (!) 158/113   Pulse: 69 64 69   Resp: 18 18 20   Temp: 98.1 °F (36.7 °C)     TempSrc: Oral     SpO2: 99% 95% 94%   Weight: 287 lb (130.2 kg)     Height: 5' 8\" (1.727 m)       Patient was assessed at bedside labs and imaging were ordered. Patient is on dual anticoagulation. This is most likely from his residual PEs. Here today I reviewed all labs and imaging. Patient's INR is within normal limits. CT examination saw very small PEs. These are residual from when he had the larger ones. I went back in and reassessed the patient he is doing quite well at bedside. At this point I feel the patient be safely discharged home. Patient is instructed to follow-up with his primary care physician and do so within the next 1 to 2 days continue to take all his medications as prescribed and return to the nearest emergency room immediately for any new or worsening complaints. Patient understood and agreed with the plan. Patient is subsequently discharged home in stable condition. Patient has what appears to be a chronic pulmonary embolism. Patient is instructed take his anticoagulation as prescribed. He is instructed to follow-up with his primary care physician and do so within the next 1 to 2 days. He is instructed return to the nearest emergency room immediately for any new or worsening complaints. CRITICAL CARE:   None    CONSULTS:  None    PROCEDURES:  None    FINAL IMPRESSION      1.  Chronic pulmonary embolism without acute cor pulmonale, unspecified pulmonary embolism type [facility])          DISPOSITION/PLAN   Discharge    PATIENT REFERRED TO:  Mary Ellen Villanueva, APRN - CNP  [address] [address]     Call in 1 day      DISCHARGE MEDICATIONS:  Discharge Medication List as of 9/12/2022  7:36 PM          (Please note that portions of this note were completed with a voice recognition program.  Efforts were made to edit the dictations but occasionally words are mis-transcribed.)    Jesus Rosales, [address] [place] [address],   09/12/22 0831

## 2022-09-13 LAB
EKG ATRIAL RATE: 70 BPM
EKG P AXIS: 48 DEGREES
EKG P-R INTERVAL: 160 MS
EKG Q-T INTERVAL: 414 MS
EKG QRS DURATION: 82 MS
EKG QTC CALCULATION (BAZETT): 447 MS
EKG R AXIS: -25 DEGREES
EKG T AXIS: 17 DEGREES
EKG VENTRICULAR RATE: 70 BPM

## 2022-09-13 PROCEDURE — 93010 ELECTROCARDIOGRAM REPORT: CPT | Performed by: INTERNAL MEDICINE

## 2022-09-15 ENCOUNTER — OFFICE VISIT (OUTPATIENT)
Dept: ONCOLOGY | Age: 52
End: 2022-09-15
Payer: MEDICARE

## 2022-09-15 ENCOUNTER — HOSPITAL ENCOUNTER (OUTPATIENT)
Dept: INFUSION THERAPY | Age: 52
Discharge: HOME | End: 2022-09-15
Payer: MEDICARE

## 2022-09-15 VITALS
HEART RATE: 76 BPM | DIASTOLIC BLOOD PRESSURE: 71 MMHG | OXYGEN SATURATION: 93 % | RESPIRATION RATE: 16 BRPM | TEMPERATURE: 99.1 F | SYSTOLIC BLOOD PRESSURE: 134 MMHG

## 2022-09-15 VITALS
BODY MASS INDEX: 44.71 KG/M2 | RESPIRATION RATE: 16 BRPM | TEMPERATURE: 99.1 F | SYSTOLIC BLOOD PRESSURE: 134 MMHG | OXYGEN SATURATION: 93 % | HEIGHT: 68 IN | WEIGHT: 295 LBS | HEART RATE: 76 BPM | DIASTOLIC BLOOD PRESSURE: 71 MMHG

## 2022-09-15 DIAGNOSIS — I26.99 OTHER ACUTE PULMONARY EMBOLISM WITHOUT ACUTE COR PULMONALE (HCC): ICD-10-CM

## 2022-09-15 DIAGNOSIS — R53.1 WEAKNESS: ICD-10-CM

## 2022-09-15 DIAGNOSIS — I82.462 ACUTE DEEP VEIN THROMBOSIS (DVT) OF CALF MUSCLE VEIN OF LEFT LOWER EXTREMITY (HCC): ICD-10-CM

## 2022-09-15 DIAGNOSIS — R22.42 LOCALIZED SWELLING OF LEFT LOWER EXTREMITY: ICD-10-CM

## 2022-09-15 DIAGNOSIS — I82.462 ACUTE DEEP VEIN THROMBOSIS (DVT) OF CALF MUSCLE VEIN OF LEFT LOWER EXTREMITY (HCC): Primary | ICD-10-CM

## 2022-09-15 LAB — PT PCR SPECIMEN: NORMAL

## 2022-09-15 PROCEDURE — 99203 OFFICE O/P NEW LOW 30 MIN: CPT | Performed by: NURSE PRACTITIONER

## 2022-09-15 PROCEDURE — 36415 COLL VENOUS BLD VENIPUNCTURE: CPT

## 2022-09-15 PROCEDURE — 85610 PROTHROMBIN TIME: CPT

## 2022-09-15 PROCEDURE — 81241 F5 GENE: CPT

## 2022-09-15 PROCEDURE — 86147 CARDIOLIPIN ANTIBODY EA IG: CPT

## 2022-09-15 PROCEDURE — 99211 OFF/OP EST MAY X REQ PHY/QHP: CPT

## 2022-09-15 PROCEDURE — 86146 BETA-2 GLYCOPROTEIN ANTIBODY: CPT

## 2022-09-15 PROCEDURE — G8417 CALC BMI ABV UP PARAM F/U: HCPCS | Performed by: NURSE PRACTITIONER

## 2022-09-15 PROCEDURE — G8427 DOCREV CUR MEDS BY ELIG CLIN: HCPCS | Performed by: NURSE PRACTITIONER

## 2022-09-15 PROCEDURE — 85730 THROMBOPLASTIN TIME PARTIAL: CPT

## 2022-09-15 PROCEDURE — 85305 CLOT INHIBIT PROT S TOTAL: CPT

## 2022-09-15 PROCEDURE — 1036F TOBACCO NON-USER: CPT | Performed by: NURSE PRACTITIONER

## 2022-09-15 PROCEDURE — 85300 ANTITHROMBIN III ACTIVITY: CPT

## 2022-09-15 PROCEDURE — 85613 RUSSELL VIPER VENOM DILUTED: CPT

## 2022-09-15 PROCEDURE — 3017F COLORECTAL CA SCREEN DOC REV: CPT | Performed by: NURSE PRACTITIONER

## 2022-09-15 PROCEDURE — 85302 CLOT INHIBIT PROT C ANTIGEN: CPT

## 2022-09-15 PROCEDURE — 81240 F2 GENE: CPT

## 2022-09-15 NOTE — PATIENT INSTRUCTIONS
Labs today, will follow results  Return to clinic in 3 weeks   Notify clinic of any new concerns  Seek medical attention any signs of bleeding, chest pain or worsening shortness of breath

## 2022-09-15 NOTE — PROGRESS NOTES
[address] CANCER [address] [place] [address]  Dept: [phone]  Loc: [phone]       Visit Date:9/15/2022     Noemy Apple is a 46 y.o. male who presents today for:   Chief Complaint   Patient presents with    New Patient     ACUTE PULMONARY EMBOLISM, DVT        HPI:   Noemy Apple is a 46 y.o. male referred to Hematology/Oncology clinic by Dr. Madhu Jimenez for evaluation of Acute DVT with Pulmonary embolism. Patient reports a prolonged hospitalization 08/2020 due to Covid 19. He received treatment with Decadron, Remdesivir and Azithromycin. He was discharged to [facility]. He was again admitted for sepsis following Covid pneumonia. He has a complicated health history. He developed a DVT of the LLE with PE. He is currently on treatment with alternating doses of Coumadin 5mg and 7.5mg. He complains of severe weakness. He uses a wheeled walker to assist with ambulation. He denies any recent falls or trauma. He denies abnormal bleeding; no epistaxis, gingival bleeding, hemoptysis, hematemesis, hematuria, hematochezia, melena. No chest pain or cough. He complains of SOB with minimal exertion. PMH, SH, and FH:  I reviewed the patient's medication and allergy lists. The PMH, SH, and FH were also reviewed as noted on the EMR.         Past Medical History:   Diagnosis Date    Ankylosing spondylitis ([address]     Aortic stenosis, mild to moderate     Atrial fibrillation (HCC)     BPH (benign prostatic hyperplasia)     Carpal tunnel syndrome on right     rt hand    Chronic gout     COVID-19 09/2020    DM2 (diabetes mellitus, type 2) ([address]     DVT (deep venous thrombosis) (HCC)     Dyslipidemia     GERD (gastroesophageal reflux disease)     Heart failure with preserved ejection fraction ([address]     History of alcohol abuse     History of osteomyelitis     Hx of R foot wound, osteomyelitis July 2018 requiring surgery and IV Vanco    Hypertension, essential     Hypogonadism, male     Major depression     Mood disorder ([address]     Morbid obesity ([address]     DURAN (nonalcoholic steatohepatitis)     KIARA treated with BiPAP     Pulmonary emboli [address]     Vitamin D deficiency       Past Surgical History:   Procedure Laterality Date    COLONOSCOPY N/A 11/15/2020    COLONOSCOPY DIAGNOSTIC performed by Washington Hernandez MD at [facility]    ENDOSCOPY, COLON, DIAGNOSTIC      FOOT SURGERY Right     2018, R foot osteomyelitis    HIP SURGERY Left 6/29/2020    bilateral hip steroid injection, Left HIP FIRST performed by Darlyn Agudelo MD at [facility] Bilateral 4/11/2022    bilateral L-facet MBB # 1 @ L4-5 and L5-S1 performed by Darlyn Agudelo MD at [facility] N/A 5/23/2022    LESI  @ L5. performed by Darlyn Agudelo MD at [address] [place] [address] N/A 10/26/2020    SIGMOIDOSCOPY DIAGNOSTIC FLEXIBLE performed by Coco Moscoso MD at [facility]      as a baby    UPPER GASTROINTESTINAL ENDOSCOPY N/A 11/14/2020    EGD DIAGNOSTIC ONLY performed by Washington Hernandez MD at [facility] N/A 12/27/2021    [facility]GD [facility]      Family History   Problem Relation Age of Onset    Heart Disease Mother         MI    Cancer Paternal Aunt         lung      Social History     Tobacco Use    Smoking status: Never    Smokeless tobacco: Never   Substance Use Topics    Alcohol use: Not Currently     Comment: hx of abuse      Current Outpatient Medications   Medication Sig Dispense Refill    vitamin E 400 UNIT capsule Take 400 Units by mouth daily      Vitamin A 3 MG (95057 UT) TABS Take 3 mg by mouth daily      HYDROcodone-acetaminophen (NORCO) 5-325 MG per tablet Take 1 tablet by mouth every 6 hours as needed for Pain for up to 30 days.  120 tablet 0 amLODIPine (NORVASC) 10 MG tablet Take 10 mg by mouth daily      dilTIAZem (CARDIZEM) 60 MG tablet Take 60 mg by mouth 2 times daily      escitalopram (LEXAPRO) 20 MG tablet Take 20 mg by mouth daily      BASAGLAR KWIKPEN 100 UNIT/ML injection pen Inject 25 Units into the skin nightly      warfarin (COUMADIN) 5 MG tablet       pregabalin (LYRICA) 75 MG capsule Take 1 capsule by mouth 3 times daily for 30 days. 90 capsule 0    allopurinol (ZYLOPRIM) 300 MG tablet Take 1 tablet by mouth daily 90 tablet 0    hydrALAZINE (APRESOLINE) 50 MG tablet Take 1 tablet by mouth 3 times daily (Patient taking differently: Take 50 mg by mouth in the morning and at bedtime) 90 tablet 3    vitamin C (ASCORBIC ACID) 500 MG tablet Take 2 tablets by mouth daily 30 tablet 0    CHOLECALCIFEROL PO Take 2,000 Units by mouth daily      atorvastatin (LIPITOR) 40 MG tablet Take 40 mg by mouth nightly      acetaminophen (TYLENOL) 325 MG tablet Take 650 mg by mouth every 4 hours as needed for Pain      busPIRone (BUSPAR) 10 MG tablet Take 10 mg by mouth 2 times daily       Multiple Vitamins-Minerals (THERAPEUTIC MULTIVITAMIN-MINERALS) tablet Take 1 tablet by mouth daily      Probiotic Product (SOBIA-BID PROBIOTIC PO) Take 1 tablet by mouth daily       furosemide (LASIX) 20 MG tablet Take 20 mg by mouth daily      sitaGLIPtan-metFORMIN (JANUMET)  MG per tablet Take 1 tablet by mouth 2 times daily (with meals)       ARIPiprazole (ABILIFY PO) Take 15 mg by mouth daily       Blood Glucose Monitoring Suppl (FREESTYLE LITE) ARTIE Patient needs all supplies for qd testing. DX: 250.00 1 Device 11     No current facility-administered medications for this visit. Allergies   Allergen Reactions    Penicillins      Tolerated Zosyn 9/24/2020          Review of Systems:   Review of Systems   Pertinent review of systems noted in HPI, all other ROS negative.    Objective:   Physical Exam   /71 (Site: Left Lower Arm, Position: Sitting, Cuff Size: Medium Adult)   Pulse 76   Temp 99.1 °F (37.3 °C) (Oral)   Resp 16   Ht 5' 8\" (1.727 m)   Wt 295 lb (133.8 kg)   SpO2 93%   BMI 44.85 kg/m²    General appearance: No apparent distress, calm and cooperative. HEENT: Pupils equal, round, and reactive to light. Conjunctivae/corneas clear. Oral mucosa intact  Neck: Supple, with full range of motion. Trachea midline. Respiratory:  Normal respiratory effort. Clear to auscultation, bilaterally without Rales/Wheezes/Rhonchi. Cardiovascular: Regular rate and rhythm with normal S1/S2 without murmurs, rubs or gallops. Abdomen: Soft, non-tender, non-distended with active bowel sounds. Musculoskeletal: No clubbing, cyanosis or edema bilaterally. Skin: Skin color, texture, turgor normal.  No visible rashes or lesions. Neurologic:  Neurovascularly intact without any focal sensory/motor deficits. Psychiatric: Alert and oriented, thought content appropriate, normal insight  Capillary Refill: Brisk,< 3 seconds   Peripheral Pulses: +2 palpable, equal bilaterally       Imaging Studies and Labs:   CBC:   Lab Results   Component Value Date    WBC 9.9 09/12/2022    HGB 14.5 09/12/2022    HCT 43.0 09/12/2022    MCV 90.9 09/12/2022     09/12/2022     BMP:   Lab Results   Component Value Date/Time     09/12/2022 04:42 PM    K 4.0 09/12/2022 04:42 PM    K 4.2 03/10/2022 08:35 PM     09/12/2022 04:42 PM    CO2 26 09/12/2022 04:42 PM    BUN 15 09/12/2022 04:42 PM    CREATININE 1.0 09/12/2022 04:42 PM    GLUCOSE 247 09/12/2022 04:42 PM    GLUCOSE 113 06/24/2018 12:11 PM    CALCIUM 9.7 09/12/2022 04:42 PM      LFT:   Lab Results   Component Value Date    ALT 52 09/06/2022    AST 44 (H) 09/06/2022    ALKPHOS 140 (H) 09/06/2022    BILITOT 0.5 09/06/2022         Assessment and Plan:   1. Acute deep vein thrombosis (DVT) of calf muscle vein of left lower extremity (HCC)  - Antithrombin III;  Future  - Beta-2 GlycoProtein 1 Ab,IGG & IGM; Future  - Cardiolipin Antibodies IgG & IgM; Future  - Lupus (LE) panel w/ reflex; Future  - Protein C Antigen, Total; Future  - Protein S Antigen, Total; Future  - Prothrombin Gene Mutation; Future    2. Other acute pulmonary embolism without acute cor pulmonale (HCC)    3. Weakness    4. Localized swelling of left lower extremity    Return in about 3 weeks (around 10/6/2022). All patient questions answered. Pt voiced understanding. Patient agreed with treatment plan. Follow up as directed. Patient instructed to call for questions or concerns.       Electronically signed by   SANDRA Meyer - ABI

## 2022-09-16 ENCOUNTER — HOSPITAL ENCOUNTER (OUTPATIENT)
Dept: PHARMACY | Age: 52
Setting detail: THERAPIES SERIES
Discharge: HOME | End: 2022-09-16
Payer: MEDICARE

## 2022-09-16 DIAGNOSIS — Z51.81 ENCOUNTER FOR THERAPEUTIC DRUG MONITORING: ICD-10-CM

## 2022-09-16 DIAGNOSIS — Z79.01 ANTICOAGULATED ON COUMADIN: ICD-10-CM

## 2022-09-16 DIAGNOSIS — I26.99 PULMONARY EMBOLISM, UNSPECIFIED CHRONICITY, UNSPECIFIED PULMONARY EMBOLISM TYPE, UNSPECIFIED WHETHER ACUTE COR PULMONALE PRESENT (HCC): ICD-10-CM

## 2022-09-16 DIAGNOSIS — I48.91 ATRIAL FIBRILLATION, UNSPECIFIED TYPE (HCC): Primary | ICD-10-CM

## 2022-09-16 LAB — POC INR: 1.5 (ref 0.8–1.2)

## 2022-09-16 PROCEDURE — 85610 PROTHROMBIN TIME: CPT | Performed by: PHARMACIST

## 2022-09-16 PROCEDURE — 36416 COLLJ CAPILLARY BLOOD SPEC: CPT | Performed by: PHARMACIST

## 2022-09-16 PROCEDURE — 99213 OFFICE O/P EST LOW 20 MIN: CPT | Performed by: PHARMACIST

## 2022-09-16 RX ORDER — ENOXAPARIN SODIUM 150 MG/ML
135 INJECTION SUBCUTANEOUS EVERY 12 HOURS
Qty: 10 ML | Refills: 0 | Status: SHIPPED | OUTPATIENT
Start: 2022-09-16 | End: 2022-09-23 | Stop reason: ALTCHOICE

## 2022-09-16 RX ORDER — ESCITALOPRAM OXALATE 20 MG/1
20 TABLET ORAL DAILY
COMMUNITY

## 2022-09-16 NOTE — PROGRESS NOTES
Medication Management [address]  [phone] (phone)  [phone] (fax)    Mr. Zita Mahoney is a 46 y.o.  male with history of DVT, PE who presents today for anticoagulation monitoring and adjustment. Patient verifies current dosing regimen and tablet strength. No missed or extra doses. Patient denies s/s bleeding/bruising/swelling/SOB/chest pain  No blood in urine or stool. Diet is inconsistent, eats what is available, currently living at the [facility]. States hasn't really eaten Vit K foods lately. No changes in medication/OTC agents/Herbals. No change in alcohol use or tobacco use. Activity level low, states slowly improved. Using rolling walker to get around, doing better than he was previously with cane. Patient denies headaches/dizziness/lightheadedness/falls. No vomiting/diarrhea or acute illness. No Procedures scheduled in the future at this time. Had DVT/PE 8/8/22 while on Eliquis, diagnosed at [facility]. Pt went to ER after last visit due to increased SOB, was found to still have small PE on CTA chest.   Saw Heme yesterday, coagulation studies pending. Assessment:   Lab Results   Component Value Date    INR 1.50 (H) 09/16/2022    INR 2.01 (H) 09/12/2022    INR 2.00 (H) 09/12/2022     INR subtherapeutic   Recent Labs     09/16/22  1056   INR 1.50*     Pt is newer to [facility] with complicated medical history. Was previously resident of [facility], see initial [facility] encounter on 9/6/22 for dosing history. Pt is not yet on a set regimen. Plan:  Lovenox 135mg SQ Q12h, rx sent to pharmacy, pt will start today. Coumadin 10mg today and tomorrow, 7.5mg on Sunday. Recheck INR in 3 day(s). Patient reminded to call the Anticoagulation Clinic with signs or symptoms of bleeding or with any medication changes. Patient given instructions utilizing the teach back method. After visit summary printed and reviewed with patient.       Discharged ambulatory in no apparent distress.       For Pharmacy Admin Tracking Only    Intervention Detail: Dose Adjustment: 1, reason: Therapy Optimization and New Rx: 1, reason: Needs Additional Therapy  Total # of Interventions Recommended: 2  Total # of Interventions Accepted: 2  Time Spent (min): 20

## 2022-09-19 ENCOUNTER — OFFICE VISIT (OUTPATIENT)
Dept: CARDIOLOGY CLINIC | Age: 52
End: 2022-09-19
Payer: MEDICARE

## 2022-09-19 ENCOUNTER — HOSPITAL ENCOUNTER (OUTPATIENT)
Dept: PHARMACY | Age: 52
Setting detail: THERAPIES SERIES
Discharge: HOME | End: 2022-09-19
Payer: MEDICARE

## 2022-09-19 VITALS
OXYGEN SATURATION: 87 % | HEIGHT: 68 IN | BODY MASS INDEX: 44.28 KG/M2 | HEART RATE: 75 BPM | SYSTOLIC BLOOD PRESSURE: 151 MMHG | WEIGHT: 292.2 LBS | DIASTOLIC BLOOD PRESSURE: 87 MMHG

## 2022-09-19 DIAGNOSIS — I10 HYPERTENSION, ESSENTIAL: Chronic | ICD-10-CM

## 2022-09-19 DIAGNOSIS — Z86.79 HISTORY OF ATRIAL FIBRILLATION: ICD-10-CM

## 2022-09-19 DIAGNOSIS — G47.33 OSA TREATED WITH BIPAP: Chronic | ICD-10-CM

## 2022-09-19 DIAGNOSIS — I35.0 AORTIC STENOSIS, MILD: Chronic | ICD-10-CM

## 2022-09-19 DIAGNOSIS — I26.99 PULMONARY EMBOLISM, UNSPECIFIED CHRONICITY, UNSPECIFIED PULMONARY EMBOLISM TYPE, UNSPECIFIED WHETHER ACUTE COR PULMONALE PRESENT (HCC): ICD-10-CM

## 2022-09-19 DIAGNOSIS — Z79.01 ANTICOAGULATED ON COUMADIN: Primary | ICD-10-CM

## 2022-09-19 DIAGNOSIS — Z51.81 ENCOUNTER FOR THERAPEUTIC DRUG MONITORING: ICD-10-CM

## 2022-09-19 DIAGNOSIS — I50.32 CHRONIC HEART FAILURE WITH PRESERVED EJECTION FRACTION (HCC): Primary | Chronic | ICD-10-CM

## 2022-09-19 DIAGNOSIS — I48.91 ATRIAL FIBRILLATION, UNSPECIFIED TYPE (HCC): ICD-10-CM

## 2022-09-19 LAB
B2 GLYCOPROTEIN I (IGG)AB: < 10 SGU
B2 GLYCOPROTEIN I (IGM)AB: < 10 SMU
CARDIOLIPIN AB IGM: < 10 MPL
CARDIOLIPIN ANTIBODY, IGG: < 10 GPL
POC INR: 2.6 (ref 0.8–1.2)

## 2022-09-19 PROCEDURE — 99211 OFF/OP EST MAY X REQ PHY/QHP: CPT | Performed by: PHARMACIST

## 2022-09-19 PROCEDURE — 3017F COLORECTAL CA SCREEN DOC REV: CPT | Performed by: INTERNAL MEDICINE

## 2022-09-19 PROCEDURE — G8427 DOCREV CUR MEDS BY ELIG CLIN: HCPCS | Performed by: INTERNAL MEDICINE

## 2022-09-19 PROCEDURE — 99214 OFFICE O/P EST MOD 30 MIN: CPT | Performed by: INTERNAL MEDICINE

## 2022-09-19 PROCEDURE — 85610 PROTHROMBIN TIME: CPT | Performed by: PHARMACIST

## 2022-09-19 PROCEDURE — G8417 CALC BMI ABV UP PARAM F/U: HCPCS | Performed by: INTERNAL MEDICINE

## 2022-09-19 PROCEDURE — 36416 COLLJ CAPILLARY BLOOD SPEC: CPT | Performed by: PHARMACIST

## 2022-09-19 PROCEDURE — 1036F TOBACCO NON-USER: CPT | Performed by: INTERNAL MEDICINE

## 2022-09-19 NOTE — PROGRESS NOTES
Chief Complaint   Patient presents with    3 Month Follow-Up    Congestive Heart Failure   Originally patient establish cardiologist from [facility]            3 month follow up. EKG done 9-.     Hx of recent admission for PE and Left leg DVT and ssent home on coumadin and off apixaban   Was I=on apixaban for Hx of atr fib ( no ekg obtained to verify)  Was  teated in [facility]      Leg edema +1 -chronic - not worse  Sob on exertion chronic    Denied  Chest pain,  dizziness or palpitations  Non wt gain    nevere smoked    FHX  Mother had MI at her 52's      Past- 2012 had cp and abn nuc then and did not want cath that time    Patient Active Problem List   Diagnosis    History of Parox atrial fibrillation    Atrial fibrillation (HCC)    BPH (benign prostatic hyperplasia)    Carpal tunnel syndrome on right    Chronic gout    DM2 (diabetes mellitus, type 2) (HCC)    Heart failure with preserved ejection fraction (HCC)    History of alcohol abuse    History of osteomyelitis    Hypogonadism, male    Major depression    Morbid obesity ([address]    DURAN (nonalcoholic steatohepatitis)    KIARA treated with BiPAP    Vitamin D deficiency    Physical deconditioning    Mood disorder (HCC)    GERD (gastroesophageal reflux disease)    Primary osteoarthritis of left hip    Hypertension, essential    Dyslipidemia    Aortic stenosis, mild to moderate    Perirectal abscess    Elevated alkaline phosphatase level    Hx of Chest pain, atypical    Lumbar spondylosis    Lumbar facet arthropathy    Lumbar radiculitis    Spinal stenosis of lumbar region with neurogenic claudication    Ankylosing spondylitis (HCC)    Mid back pain    Pulmonary embolism ([address]    Anticoagulated on Coumadin    Encounter for therapeutic drug monitoring       Past Surgical History:   Procedure Laterality Date    COLONOSCOPY N/A 11/15/2020    COLONOSCOPY DIAGNOSTIC performed by Sajan Harris MD at [facility]    ENDOSCOPY, COLON, DIAGNOSTIC      FOOT SURGERY Right 2018, R foot osteomyelitis    HIP SURGERY Left 6/29/2020    bilateral hip steroid injection, Left HIP FIRST performed by aDrlyn Agudelo MD at [facility] Bilateral 4/11/2022    bilateral L-facet MBB # 1 @ L4-5 and L5-S1 performed by Darlyn Agudelo MD at [facility] N/A 5/23/2022    LESI  @ L5. performed by Darlyn Agudelo MD at [facility] N/A 10/26/2020    SIGMOIDOSCOPY DIAGNOSTIC FLEXIBLE performed by Coco Moscoso MD at [facility]      as a baby    UPPER GASTROINTESTINAL ENDOSCOPY N/A 11/14/2020    EGD DIAGNOSTIC ONLY performed by Washington Hernandez MD at [facility] N/A 12/27/2021    EGD performed by Washington Hernandez MD at [facility]       Allergies   Allergen Reactions    Penicillins      Tolerated Zosyn 9/24/2020        Family History   Problem Relation Age of Onset    Heart Disease Mother         MI    Cancer Paternal Aunt         lung        Social History     Socioeconomic History    Marital status:      Spouse name: Not on file    Number of children: Not on file    Years of education: Not on file    Highest education level: Not on file   Occupational History    Not on file   Tobacco Use    Smoking status: Never    Smokeless tobacco: Never   Vaping Use    Vaping Use: Never used   Substance and Sexual Activity    Alcohol use: Not Currently     Comment: hx of abuse    Drug use: No    Sexual activity: Not Currently   Other Topics Concern    Not on file   Social History Narrative    Not on file     Social Determinants of Health     Financial Resource Strain: Not on file   Food Insecurity: Not on file   Transportation Needs: Not on file   Physical Activity: Not on file   Stress: Not on file   Social Connections: Not on file   Intimate Partner Violence: Not on file   Housing Stability: Not on file Current Outpatient Medications   Medication Sig Dispense Refill    enoxaparin (LOVENOX) 150 MG/ML SOSY injection Inject 0.9 mLs into the skin in the morning and 0.9 mLs in the evening. 10 mL 0    escitalopram (LEXAPRO) 20 MG tablet Take 20 mg by mouth daily      vitamin E 400 UNIT capsule Take 400 Units by mouth daily      Vitamin A 3 MG (44869 UT) TABS Take 3 mg by mouth daily      HYDROcodone-acetaminophen (NORCO) 5-325 MG per tablet Take 1 tablet by mouth every 6 hours as needed for Pain for up to 30 days. 120 tablet 0    amLODIPine (NORVASC) 10 MG tablet Take 10 mg by mouth daily      dilTIAZem (CARDIZEM) 60 MG tablet Take 60 mg by mouth 2 times daily      BASAGLAR KWIKPEN 100 UNIT/ML injection pen Inject 25 Units into the skin nightly      warfarin (COUMADIN) 5 MG tablet       pregabalin (LYRICA) 75 MG capsule Take 1 capsule by mouth 3 times daily for 30 days.  90 capsule 0    allopurinol (ZYLOPRIM) 300 MG tablet Take 1 tablet by mouth daily 90 tablet 0    hydrALAZINE (APRESOLINE) 50 MG tablet Take 1 tablet by mouth 3 times daily (Patient taking differently: Take 50 mg by mouth in the morning and at bedtime) 90 tablet 3    vitamin C (ASCORBIC ACID) 500 MG tablet Take 2 tablets by mouth daily 30 tablet 0    CHOLECALCIFEROL PO Take 2,000 Units by mouth daily      atorvastatin (LIPITOR) 40 MG tablet Take 40 mg by mouth nightly      acetaminophen (TYLENOL) 325 MG tablet Take 650 mg by mouth every 4 hours as needed for Pain      busPIRone (BUSPAR) 10 MG tablet Take 10 mg by mouth 2 times daily       Multiple Vitamins-Minerals (THERAPEUTIC MULTIVITAMIN-MINERALS) tablet Take 1 tablet by mouth daily      Probiotic Product (SOBIA-BID PROBIOTIC PO) Take 1 tablet by mouth daily       furosemide (LASIX) 20 MG tablet Take 20 mg by mouth daily      sitaGLIPtan-metFORMIN (JANUMET)  MG per tablet Take 1 tablet by mouth 2 times daily (with meals)       ARIPiprazole (ABILIFY PO) Take 15 mg by mouth daily       Blood Glucose Monitoring Suppl (FREESTYLE LITE) ARTIE Patient needs all supplies for qd testing. DX: 250.00 1 Device 11     No current facility-administered medications for this visit. Review of Systems -     General ROS: negative  Psychological ROS: negative  Hematological and Lymphatic ROS: No history of blood clots or bleeding disorder. Respiratory ROS: no cough,  or wheezing, the rest see HPI  Cardiovascular ROS: See HPI  Gastrointestinal ROS: negative  Genito-Urinary ROS: no dysuria, trouble voiding, or hematuria  Musculoskeletal ROS: negative  Neurological ROS: no TIA or stroke symptoms  Dermatological ROS: negative      Blood pressure (!) 151/87, pulse 75, height 5' 8\" (1.727 m), weight 292 lb 3.2 oz (132.5 kg), SpO2 (!) 87 %. Physical Examination:    General appearance - alert, well appearing, and in no distress  HEENT- Pink conjunctiva  , Non-icteri sclera,PERRLA  Mental status - alert, oriented to person, place, and time  Neck - supple, no significant adenopathy, no JVD, or carotid bruits  Chest - clear to auscultation, no wheezes, rales or rhonchi, symmetric air entry  Heart - normal rate, regular rhythm, normal S1, S2, no murmurs, rubs, clicks or gallops  Abdomen - soft, nontender, nondistended, no masses or organomegaly  JORDAN- no CVA or flank tenderness, no suprapubic tenderness  Neurological - alert, oriented, normal speech, no focal findings or movement disorder noted  Musculoskeletal/limbs - no joint tenderness, deformity or swelling   - peripheral pulses normal, no pedal edema, no clubbing or cyanosis  Skin - normal coloration and turgor, no rashes, no suspicious skin lesions noted  Psych- appropriate mood and affect    Lab  No results for input(s): CKTOTAL, CKMB, CKMBINDEX, TROPONINI in the last 72 hours.   CBC:   Lab Results   Component Value Date/Time    WBC 9.9 09/12/2022 04:42 PM    RBC 4.73 09/12/2022 04:42 PM    RBC 4.55 04/15/2012 11:53 PM    HGB 14.5 09/12/2022 04:42 PM    HCT 43.0 09/12/2022 04:42 PM    MCV 90.9 09/12/2022 04:42 PM    MCH 30.7 09/12/2022 04:42 PM    MCHC 33.7 09/12/2022 04:42 PM    RDW 19.9 04/06/2020 12:00 AM     09/12/2022 04:42 PM    MPV 11.0 09/12/2022 04:42 PM     BMP:    Lab Results   Component Value Date/Time     09/12/2022 04:42 PM    K 4.0 09/12/2022 04:42 PM    K 4.2 03/10/2022 08:35 PM     09/12/2022 04:42 PM    CO2 26 09/12/2022 04:42 PM    BUN 15 09/12/2022 04:42 PM    LABALBU 4.3 09/06/2022 02:15 PM    LABALBU 4.4 01/26/2012 10:59 AM    CREATININE 1.0 09/12/2022 04:42 PM    CALCIUM 9.7 09/12/2022 04:42 PM    GFRAA >60 01/23/2019 07:12 PM    LABGLOM 78 09/12/2022 04:42 PM    GLUCOSE 247 09/12/2022 04:42 PM    GLUCOSE 113 06/24/2018 12:11 PM     Hepatic Function Panel:    Lab Results   Component Value Date/Time    ALKPHOS 140 09/06/2022 02:15 PM    ALT 52 09/06/2022 02:15 PM    AST 44 09/06/2022 02:15 PM    PROT 7.4 09/06/2022 02:15 PM    BILITOT 0.5 09/06/2022 02:15 PM    BILIDIR <0.2 09/06/2022 02:15 PM    LABALBU 4.3 09/06/2022 02:15 PM    LABALBU 4.4 01/26/2012 10:59 AM     Magnesium:    Lab Results   Component Value Date/Time    MG 1.4 11/19/2020 03:58 AM     Warfarin PT/INR:  No components found for: PTPATWAR, PTINRWAR  HgBA1c:    Lab Results   Component Value Date/Time    LABA1C 7.3 09/06/2021 11:42 PM     FLP:  No results found for: TRIG, HDL, LDLCALC, LDLDIRECT, LABVLDL  TSH:  No results found for: TSH  Conclusions      Summary   Ejection fraction is visually estimated at 60%. Overall left ventricular function is normal.   The aortic valve leaflets were not well visualized. Aortic valve appears tricuspid. Aortic valve leaflets are somewhat thickened. Aortic valve leaflets are Mildly calcified. The maximum aortic valve gradient is 30 mmHg, the mean gradient is 15   mmHg, and the peak velocity is 275 cm/s. There is mild-to-moderate aortic stenosis with valve area of 1.5 sq cm.       Signature ----------------------------------------------------------------   Electronically signed by Marine Chicas MD (Interpreting   physician) on 09/24/2020 at 04:36 PM   ----------------------------------------------------------------           Conclusions      Summary   Ejection fraction is visually estimated at 55%. Overall left ventricular function is normal.      Signature      ----------------------------------------------------------------   Electronically signed by Marine Chicas MD (Interpreting   physician) on 09/07/2021 at 03:43 PM   ----------------------------------------------------------------    Conclusions      Summary   This Nuclear Medicine study was negative for ischemia . difficult scan due to patient body habitus   normal EF      Recommendation   Medical management. Signatures      ----------------------------------------------------------------   Electronically signed by Marine Chicas MD (Interpreting   Cardiologist) on 09/07/2021 at 16:38   ----------------------------------------------------------------           EKG 9/30/19  Sinus  Rhythm   Low voltage in precordial leads.    -  Nonspecific T-abnormality. ABNORMAL   Normal sinus rhythm  Low voltage QRS, consider pulmonary disease, pericardial effusion, or normal variant  Nonspecific T wave abnormality  Abnormal ECG  When compared with ECG of 09-NOV-2011 22:01,  No significant change was found  Confirmed by Bill Dejesus MD, Belen Au ([id number]) on 5/20/2020 8:02:47    topronin neg x2    ekg 3/10/22  Normal sinus rhythm Low voltage QRS, consider pulmonary disease, pericardial effusion, or normal variant Borderline ECG When compared with ECG of 10-MAR-2022 19:59, No significant change was found    Assessment       Diagnosis Orders   1. Chronic heart failure with preserved ejection fraction ([address]        2. Hypertension, essential        3. History of Parox atrial fibrillation        4. Aortic stenosis, mild to moderate        5.  KIARA treated with BiPAP                Plan     The most current meds and labs reviewed    Continue the current treatment and with constant vigilance to changes in symptoms and also any potential side effects. Return for care or seek medical attention immediately if symptoms got worse and/or develop new symptoms. Hypertension, on medical treatment. Seems to be under poor control. Patient is compliant with medical treatment. Now on losartan hctz 50/12.5 po qd  Cont  hydralazine 50 po tid    Echo and nuc stress- WNL sept 2021  Asa 81 po qd  Limited walking capability  No cp free  Advised to get to ER if any recurrence of cp    Congestive heart failure: no evidence of fluid overload today, no recent hospitalization for CHF  Leg edema +1 stable  No wt gain  On lasix  20  Cont  kcl 20 meq po qd  Pat did not bring his med list    Advised to lose wt     Hyperlipidemia: on statins, followed periodically. Patient need periodic lipid and liver profile. Hx of atr fib - no ekg found But documented on note from outside  Off  apixaban  Now on coumadin started after he had DVT /PE at [facility]  Coumadin prescribed by other clinician       Document reviewed from outside  Reported CHF and atr fib and meds   Lasix and apixaban for it  Need to get ekg with atr fib and did not find one    D/w the pat plan of care    from cardiac stand Stable and doing well    Discussed use, benefit, and side effects of prescribed medications. All patient questions answered. Pt voiced understanding. Instructed to continue current medications, diet and exercise. Continue risk factor modification and medical management. Patient agreed with treatment plan. Follow up as directed.       RTC in 3   months        [facility]

## 2022-09-19 NOTE — PROGRESS NOTES
Medication Management [address]  [phone] (phone)  [phone] (fax)    Mr. Jim Cuevas is a 46 y.o.  male with history of PE, Afib who presents today for anticoagulation monitoring and adjustment. Patient verifies current dosing regimen and tablet strength. No missed or extra doses. Took 10mg of Coumadin Friday Saturday and Sunday (instead of instructed 10mg Fri & Sat 7.5mg Sun) was not able to  Prescription for Lovenox. [facility] does have delivery MWF if patient needs this in the future he can call [employer] to set up a delivery. [phone]  Patient denies s/s bleeding/bruising/swelling/SOB/chest pain Still a little chest pain he said feels the same since he had the clot. No blood in urine or stool. No dietary changes. Patient states he is hungry and would like [employer]. No changes in medication/OTC agents/Herbals. No change in alcohol use or tobacco use. No change in activity level. Patient denies headaches/dizziness/lightheadedness/falls. No vomiting/diarrhea or acute illness. No Procedures scheduled in the future at this time. Had DVT/PE 8/8/22 while on Eliquis, diagnosed at [facility]. Saw Heme 9/15, coagulation studies pending    Assessment:   Lab Results   Component Value Date    INR 2.60 (H) 09/19/2022    INR 1.50 (H) 09/16/2022    INR 2.01 (H) 09/12/2022     INR therapeutic   Recent Labs     09/19/22  1523   INR 2.60*   1st therapeutic INR following a total of 55 mg of coumadin the last 7 days. Plan:  Coumadin 5mg x1 today then Coumadin 7.5mg daily (7.5mg daily would be 52.5 mg per week) Recheck INR in 4 days. Patient reminded to call the Anticoagulation Clinic with any signs or symptoms of bleeding or with any medication changes. Patient given instructions utilizing the teach back method. Requested ride set up for patient for Friday. After visit summary printed and reviewed with patient.       Discharged with walker in no apparent distress.     For Pharmacy Admin Tracking Only    Intervention Detail: Dose Adjustment: 1, reason: Therapy Optimization  Total # of Interventions Recommended: 1  Total # of Interventions Accepted: 1  Time Spent (min): 20

## 2022-09-20 LAB
ANTITHROMBIN ACTIVITY: 102 % (ref 76–128)
PROTEIN C ANTIGEN: > 95 % (ref 63–153)
PROTEIN S ANTIGEN, TOTAL: 120 % (ref 84–134)

## 2022-09-21 LAB
PROTHROMBIN G20210A MUTATION: NEGATIVE
R506Q MUTATION, F5DN1M: NEGATIVE

## 2022-09-21 RX ORDER — AMLODIPINE BESYLATE 10 MG/1
10 TABLET ORAL DAILY
Qty: 90 TABLET | Refills: 0 | Status: SHIPPED | OUTPATIENT
Start: 2022-09-21

## 2022-09-21 RX ORDER — FUROSEMIDE 20 MG/1
20 TABLET ORAL DAILY
Qty: 90 TABLET | Refills: 0 | Status: SHIPPED | OUTPATIENT
Start: 2022-09-21

## 2022-09-21 RX ORDER — DILTIAZEM HYDROCHLORIDE 60 MG/1
60 TABLET, FILM COATED ORAL 2 TIMES DAILY
Qty: 180 TABLET | Refills: 0 | Status: SHIPPED | OUTPATIENT
Start: 2022-09-21 | End: 2022-09-26

## 2022-09-21 NOTE — TELEPHONE ENCOUNTER
Luke Dupont called requesting a refill on the following medications:  Requested Prescriptions     Pending Prescriptions Disp Refills    amLODIPine (NORVASC) 10 MG tablet 30 tablet      Sig: Take 1 tablet by mouth daily    furosemide (LASIX) 20 MG tablet 60 tablet      Sig: Take 1 tablet by mouth daily    dilTIAZem (CARDIZEM) 60 MG tablet 120 tablet      Sig: Take 1 tablet by mouth 2 times daily     Pharmacy verified: [address] [facility] [place]   . pv      Date of last visit: 9/19/2022  Date of next visit (if applicable): 75/31/7066

## 2022-09-23 ENCOUNTER — HOSPITAL ENCOUNTER (OUTPATIENT)
Dept: PHARMACY | Age: 52
Setting detail: THERAPIES SERIES
Discharge: HOME | End: 2022-09-23
Payer: MEDICARE

## 2022-09-23 ENCOUNTER — TELEPHONE (OUTPATIENT)
Dept: CARDIOLOGY CLINIC | Age: 52
End: 2022-09-23

## 2022-09-23 DIAGNOSIS — I48.91 ATRIAL FIBRILLATION, UNSPECIFIED TYPE (HCC): Primary | ICD-10-CM

## 2022-09-23 DIAGNOSIS — I26.99 PULMONARY EMBOLISM, UNSPECIFIED CHRONICITY, UNSPECIFIED PULMONARY EMBOLISM TYPE, UNSPECIFIED WHETHER ACUTE COR PULMONALE PRESENT (HCC): ICD-10-CM

## 2022-09-23 DIAGNOSIS — Z51.81 ENCOUNTER FOR THERAPEUTIC DRUG MONITORING: ICD-10-CM

## 2022-09-23 DIAGNOSIS — Z79.01 ANTICOAGULATED ON COUMADIN: ICD-10-CM

## 2022-09-23 LAB — POC INR: 2.4 (ref 0.8–1.2)

## 2022-09-23 PROCEDURE — 99211 OFF/OP EST MAY X REQ PHY/QHP: CPT | Performed by: PHARMACIST

## 2022-09-23 PROCEDURE — 85610 PROTHROMBIN TIME: CPT | Performed by: PHARMACIST

## 2022-09-23 PROCEDURE — 36416 COLLJ CAPILLARY BLOOD SPEC: CPT | Performed by: PHARMACIST

## 2022-09-23 NOTE — TELEPHONE ENCOUNTER
Jillian from [facility] [place] asking if pt should be on both Diltiazem and Amlodipine, since both CCB. Please advise.    [phone]

## 2022-09-23 NOTE — PROGRESS NOTES
Medication Management [address]  [phone] (phone)  [phone] (fax)    Mr. Mortimer Chime is a 46 y.o.  male with history of PE, Afib who presents today for anticoagulation monitoring and adjustment. Patient verifies current dosing regimen and tablet strength. Took 5mg yesterday, had been instructed to take 7.5mg. Patient denies s/s bleeding/bruising/swelling/SOB/chest pain  No blood in urine or stool. Not eating his typical foods this week. No changes in medication/OTC agents/Herbals. States that he does not use Basaglar, removed from list.   No change in alcohol use or tobacco use. Activity level lower than usual.  Patient denies headaches/dizziness/lightheadedness/falls. No vomiting/diarrhea or acute illness. No Procedures scheduled in the future at this time. Assessment:   Lab Results   Component Value Date    INR 2.40 (H) 09/23/2022    INR 2.60 (H) 09/19/2022    INR 1.50 (H) 09/16/2022     INR therapeutic   Recent Labs     09/23/22  1020   INR 2.40*         Plan:  Coumadin 7.5mg daily. Recheck INR in 5 day(s). Patient reminded to call the Anticoagulation Clinic with any signs or symptoms of bleeding or with any medication changes. Patient given instructions utilizing the teach back method. After visit summary printed and reviewed with patient. Discharged ambulatory in no apparent distress.       For Pharmacy Admin Tracking Only    Intervention Detail: Dose Adjustment: 1, reason: Therapy Optimization  Total # of Interventions Recommended: 1  Total # of Interventions Accepted: 1  Time Spent (min): 20

## 2022-09-26 ENCOUNTER — OFFICE VISIT (OUTPATIENT)
Dept: PHYSICAL MEDICINE AND REHAB | Age: 52
End: 2022-09-26
Payer: MEDICARE

## 2022-09-26 ENCOUNTER — TELEPHONE (OUTPATIENT)
Dept: CARDIOLOGY CLINIC | Age: 52
End: 2022-09-26

## 2022-09-26 ENCOUNTER — TELEPHONE (OUTPATIENT)
Dept: PHYSICAL MEDICINE AND REHAB | Age: 52
End: 2022-09-26

## 2022-09-26 VITALS
WEIGHT: 292 LBS | HEIGHT: 68 IN | DIASTOLIC BLOOD PRESSURE: 84 MMHG | BODY MASS INDEX: 44.25 KG/M2 | SYSTOLIC BLOOD PRESSURE: 136 MMHG

## 2022-09-26 DIAGNOSIS — G89.4 CHRONIC PAIN SYNDROME: ICD-10-CM

## 2022-09-26 DIAGNOSIS — M47.816 LUMBAR FACET ARTHROPATHY: ICD-10-CM

## 2022-09-26 DIAGNOSIS — M54.17 LUMBOSACRAL RADICULITIS: Primary | ICD-10-CM

## 2022-09-26 DIAGNOSIS — M79.2 NERVE PAIN: ICD-10-CM

## 2022-09-26 DIAGNOSIS — M47.816 LUMBAR SPONDYLOSIS: ICD-10-CM

## 2022-09-26 DIAGNOSIS — M48.062 SPINAL STENOSIS OF LUMBAR REGION WITH NEUROGENIC CLAUDICATION: ICD-10-CM

## 2022-09-26 PROCEDURE — 99214 OFFICE O/P EST MOD 30 MIN: CPT | Performed by: NURSE PRACTITIONER

## 2022-09-26 PROCEDURE — 1036F TOBACCO NON-USER: CPT | Performed by: NURSE PRACTITIONER

## 2022-09-26 PROCEDURE — G8417 CALC BMI ABV UP PARAM F/U: HCPCS | Performed by: NURSE PRACTITIONER

## 2022-09-26 PROCEDURE — 3017F COLORECTAL CA SCREEN DOC REV: CPT | Performed by: NURSE PRACTITIONER

## 2022-09-26 PROCEDURE — G8427 DOCREV CUR MEDS BY ELIG CLIN: HCPCS | Performed by: NURSE PRACTITIONER

## 2022-09-26 ASSESSMENT — ENCOUNTER SYMPTOMS
BACK PAIN: 1
CHOKING: 0
SHORTNESS OF BREATH: 0
EYES NEGATIVE: 1
RESPIRATORY NEGATIVE: 1

## 2022-09-26 NOTE — TELEPHONE ENCOUNTER
[employer] notified. [facility] updated. Spoke to patient and updated him on change. Instructed patient to take Hydralazine TID not BID, pt expresses understanding.

## 2022-09-26 NOTE — TELEPHONE ENCOUNTER
Melina Pastor was on losartan hctz  and was  not on norvasc or cardizem -which apparently started by other clinician and I happen to refill without even knowing he is taking these.     Recommend  Stopn cardizem  Cont norvasc  Make sure pat taking hydralazine 50 TID instead of BID that he is taking

## 2022-09-26 NOTE — PROGRESS NOTES
[facility] [address]  Dept: [phone]  Dept Fax: [phone]: [phone]    Visit Date: 9/26/2022    Functionality Assessment/Goals Worksheet     On a scale of 0 (Does not Interfere) to 10 (Completely Interferes)     1. Which number describes how during the past week pain has interfered with       the following:  A. General Activity:  7  B. Mood: 8  C. Walking Ability:  8  D. Normal Work (Includes both work outside the home and housework):  8  E. Relations with Other People:   7  F. Sleep:   7  G. Enjoyment of Life:   6    2. Patient Prefers to Take their Pain Medications:     [x]  On a regular basis   [x]  Only when necessary    []  Does not take pain medications    3. What are the Patient's Goals/Expectations for Visiting Pain Management? []  Learn about my pain    [x]  Receive Medication   []  Physical Therapy     []  Treat Depression   [x]  Receive Injections    []  Treat Sleep   []  Deal with Anxiety and Stress   []  Treat Opoid Dependence/Addiction   []  Other:      HPI:   Luke Dupont is a 46 y.o. male is here today for    Chief Complaint: Low back pain, leg pain     HPI   3.5 month FU. Continues to have pain in low back aching, stiff \"like knots and twisting\" and pain radiating mainly down left leg posterior to calf- tingling, numbness, shooting and aching. Has shooting and burning in right leg as well with numbness in toes. Pain has been up and down and increased with activity. Pain is more bothersome in legs compared to low back     Had ER visit at [facility] in August for chest pain and SOB  found to have a PE on anticoagulation and spent time in [facility] for therapy needs   Pain increases with bending, lifting, twisting , walking, standing, getting up and down, and housework or working at job.       Continues Norco and Lyrica which helps take the edge off. States that he was on Percocet in the hospital and ECF and that worked better. He is asking to change to Percocet but I discussed focusing on interventional procedures for pain     Medications reviewed. Patient denies side effects with medications. Patient states he is taking medications as prescribed. Hedenies receiving pain medications from other sources. He  had 2 ER visits since last visit   any ER visits since last visit. Pain scale with out pain medications or at its worst is 8/10. Pain scale with pain medications or at its best is 4-5/10. Last dose of Lyrica and Norco was today   Drug screen reviewed from 6/7/2022 and was appropriate  Pill count was not completed today and instructed  he needs to bring to appointments       The patientis allergic to penicillins. Subjective:      Review of Systems   Eyes: Negative. Respiratory: Negative. Negative for choking and shortness of breath. Cardiovascular:  Positive for leg swelling. Musculoskeletal:  Positive for arthralgias, back pain, gait problem, joint swelling and myalgias. Negative for neck pain. Ambulating with quad cane    Skin: Negative. Neurological:  Positive for weakness and numbness. Psychiatric/Behavioral: Negative. Objective:     Vitals:    09/26/22 1210   BP: 136/84   Weight: 292 lb (132.5 kg)   Height: 5' 8\" (1.727 m)       Physical Exam  Constitutional:       Appearance: Normal appearance. HENT:      Head: Normocephalic and atraumatic. Right Ear: External ear normal.      Left Ear: External ear normal.      Nose: Nose normal.      Mouth/Throat:      Mouth: Mucous membranes are moist.      Pharynx: Oropharynx is clear. Eyes:      General:         Right eye: No discharge. Extraocular Movements: Extraocular movements intact. Conjunctiva/sclera: Conjunctivae normal.      Pupils: Pupils are equal, round, and reactive to light.    Cardiovascular:      Rate and Rhythm: Normal rate and regular rhythm. Pulses: Decreased pulses. Pulmonary:      Effort: Pulmonary effort is normal.      Breath sounds: Normal breath sounds. Abdominal:      General: Abdomen is flat. Bowel sounds are normal.   Musculoskeletal:         General: Tenderness present. Right shoulder: Decreased range of motion. Left shoulder: Decreased range of motion. Right wrist: Decreased range of motion. Right hand: Decreased range of motion. Cervical back: Normal range of motion and neck supple. Bony tenderness present. No tenderness. Lumbar back: Tenderness and bony tenderness present. No swelling. Decreased range of motion. Positive right straight leg raise test and positive left straight leg raise test.        Back:       Right hip: Tenderness and bony tenderness present. Decreased range of motion. Decreased strength. Left hip: Tenderness and bony tenderness present. Decreased range of motion. Decreased strength. Right upper leg: Tenderness and bony tenderness present. Right knee: Swelling present. Decreased range of motion. Tenderness present. Left knee: Swelling present. Decreased range of motion. Tenderness present. Right lower leg: Swelling, tenderness and bony tenderness present. 1+ Pitting Edema present. Left lower leg: Swelling, tenderness and bony tenderness present. 1+ Pitting Edema present. Right ankle: Swelling present. Tenderness present. Decreased range of motion. Left ankle: Swelling present. Tenderness present. Decreased range of motion. Left foot: Decreased range of motion. Tenderness present. Skin:     General: Skin is warm and dry. Findings: Bruising present. Neurological:      General: No focal deficit present. Mental Status: He is alert and oriented to person, place, and time. Sensory: Sensory deficit present. Motor: Weakness present.       Coordination: Coordination abnormal.      Gait: Gait abnormal.      Deep Tendon Reflexes:      Reflex Scores:       Tricep reflexes are 2+ on the right side and 2+ on the left side. Bicep reflexes are 2+ on the right side and 2+ on the left side. Brachioradialis reflexes are 2+ on the right side and 2+ on the left side. Patellar reflexes are 1+ on the right side and 1+ on the left side. Achilles reflexes are 1+ on the right side and 1+ on the left side. Comments: Strength 4/5 bilateral lower extremities     Numbness and decreased sensation bilateral feet. Psychiatric:         Mood and Affect: Mood normal.     BIGG  Patricks test  positive  Yeoman's  or Gaenslen's positive         Assessment:     1. Lumbosacral radiculitis    2. Spinal stenosis of lumbar region with neurogenic claudication    3. Lumbar spondylosis    4. Lumbar facet arthropathy    5. Nerve pain    6. Chronic pain syndrome            Plan:      OARRS reviewed. Current MED: 20.00  Patient was offered naloxone for home. Discussed long term side effects of medications, tolerance, dependency and addiction. Previous UDS reviewed  UDS preformed today for compliance. Patient told can not receive any pain medications from any other source. No evidence of abuse, diversion or aberrant behavior. Medications and/or procedures to improve function and quality of life- patient understanding with this and that may not be pain free  Discussed with patient about safe storage of medications at home  Discussed possible weaning of medication dosing dependent on treatment/procedure results. Discussed with patient about risks with procedure including infection, reaction to medication, increased pain, or bleeding. Procedure notes reviewed in detail   Plan LESI # 2 @ L4-5. Procedure discussed with patient.    If patient is on blood thinners will need approval to hold yes- On Coumadin needs cardiology and medical clearance recent hospitalization for PE in August and being treated for this and DVT  At this time Continue Norco 5/325 QID prn- filled 9/7/2022  Continue Lyrica from pcp   Was not able to have EMG d/t leg swelling         Meds. Prescribed:   No orders of the defined types were placed in this encounter. Return for LESI # 2 @ L4-5. , follow up after procedure.                Electronically signed by SANDRA Fierro CNP on9/26/2022 at 4:00 PM

## 2022-09-27 ENCOUNTER — PREP FOR PROCEDURE (OUTPATIENT)
Dept: ONCOLOGY | Age: 52
End: 2022-09-27

## 2022-09-27 ENCOUNTER — TELEPHONE (OUTPATIENT)
Dept: ONCOLOGY | Age: 52
End: 2022-09-27

## 2022-09-27 ENCOUNTER — TELEPHONE (OUTPATIENT)
Dept: PHARMACY | Age: 52
End: 2022-09-27

## 2022-09-27 LAB
DRVVT 1:1 MIX: ABNORMAL SEC (ref 33–44)
DRVVT CONFIRMATION TEST: ABNORMAL RATIO
DRVVT SCREEN: 38 SEC (ref 33–44)
HEXAGONAL PHOSPHOLIPID NEUTRALIZAT TEST: ABNORMAL
LUPUS ANTICOAG INTERP: ABNORMAL
PLATELET NEUTRALIZATION: ABNORMAL
PROTHROMBIN TIME: 17 SEC (ref 12–15.5)
PTT 1:1 MIX: ABNORMAL SEC (ref 32–48)
PTT LUPUS ANTICOAGULANT: 43 SEC (ref 32–48)
PTT-HEPARIN NEUTRALIZED: ABNORMAL SEC (ref 32–48)
REPTILASE TIME: ABNORMAL SEC
THROMBIN TIME: ABNORMAL SEC (ref 14.7–19.5)

## 2022-09-27 NOTE — TELEPHONE ENCOUNTER
Noted.  Will follow for plan. Patient due for routine fasting blood work for physical exam. Will follow up with results.

## 2022-09-27 NOTE — TELEPHONE ENCOUNTER
recent hospitalization for PE in August and being treated for this and DVT-- treated at [facility]  I do not know who treat him for the Hx of PE /DVT  Coumadin should be addressed by the clinician treating PE/DVT for him. From cardiac point - may hold coumadin as needed. To begin with--Has Hx of questionable afib ( not confirmed)and was on apixaban and that has been switched to coumadin after clotting issue in [facility].

## 2022-09-27 NOTE — TELEPHONE ENCOUNTER
Encounter faxed to [phone], [phone].

## 2022-09-27 NOTE — TELEPHONE ENCOUNTER
Please note yesterday's entry from Dr. Jennifer Rivers regarding cardiac clearance for a procedure with [facility] (not on schedule yet). Nothing in media tab yet. Has clinic visit tomorrow.   Message forwarded to clinic pharmacist.

## 2022-09-27 NOTE — TELEPHONE ENCOUNTER
Patient is at risk for recurrent DVT/PE and requires bridge therapy with Lovenox for lumbar epidural steroid injection.

## 2022-09-28 ENCOUNTER — HOSPITAL ENCOUNTER (OUTPATIENT)
Dept: PHARMACY | Age: 52
Setting detail: THERAPIES SERIES
Discharge: HOME | End: 2022-09-28
Payer: MEDICARE

## 2022-09-28 DIAGNOSIS — I48.91 ATRIAL FIBRILLATION, UNSPECIFIED TYPE (HCC): Primary | ICD-10-CM

## 2022-09-28 DIAGNOSIS — Z51.81 ENCOUNTER FOR THERAPEUTIC DRUG MONITORING: ICD-10-CM

## 2022-09-28 DIAGNOSIS — I26.99 PULMONARY EMBOLISM, UNSPECIFIED CHRONICITY, UNSPECIFIED PULMONARY EMBOLISM TYPE, UNSPECIFIED WHETHER ACUTE COR PULMONALE PRESENT (HCC): ICD-10-CM

## 2022-09-28 DIAGNOSIS — Z79.01 ANTICOAGULATED ON COUMADIN: ICD-10-CM

## 2022-09-28 LAB — POC INR: 1.7 (ref 0.8–1.2)

## 2022-09-28 PROCEDURE — 36416 COLLJ CAPILLARY BLOOD SPEC: CPT

## 2022-09-28 PROCEDURE — 99212 OFFICE O/P EST SF 10 MIN: CPT

## 2022-09-28 PROCEDURE — 85610 PROTHROMBIN TIME: CPT

## 2022-10-06 ENCOUNTER — OFFICE VISIT (OUTPATIENT)
Dept: ONCOLOGY | Age: 52
End: 2022-10-06
Payer: MEDICARE

## 2022-10-06 ENCOUNTER — SOCIAL WORK (OUTPATIENT)
Dept: INFUSION THERAPY | Age: 52
End: 2022-10-06

## 2022-10-06 ENCOUNTER — HOSPITAL ENCOUNTER (OUTPATIENT)
Dept: INFUSION THERAPY | Age: 52
Discharge: HOME | End: 2022-10-06
Payer: MEDICARE

## 2022-10-06 VITALS
OXYGEN SATURATION: 95 % | HEART RATE: 73 BPM | DIASTOLIC BLOOD PRESSURE: 78 MMHG | HEIGHT: 68 IN | RESPIRATION RATE: 18 BRPM | WEIGHT: 296 LBS | TEMPERATURE: 98.7 F | BODY MASS INDEX: 44.86 KG/M2 | SYSTOLIC BLOOD PRESSURE: 147 MMHG

## 2022-10-06 VITALS
OXYGEN SATURATION: 95 % | DIASTOLIC BLOOD PRESSURE: 78 MMHG | WEIGHT: 296 LBS | BODY MASS INDEX: 44.86 KG/M2 | HEIGHT: 68 IN | RESPIRATION RATE: 18 BRPM | SYSTOLIC BLOOD PRESSURE: 147 MMHG | HEART RATE: 73 BPM | TEMPERATURE: 98.7 F

## 2022-10-06 DIAGNOSIS — I82.462 ACUTE DEEP VEIN THROMBOSIS (DVT) OF CALF MUSCLE VEIN OF LEFT LOWER EXTREMITY (HCC): Primary | ICD-10-CM

## 2022-10-06 DIAGNOSIS — M47.816 LUMBAR SPONDYLOSIS: ICD-10-CM

## 2022-10-06 DIAGNOSIS — G89.4 CHRONIC PAIN SYNDROME: ICD-10-CM

## 2022-10-06 DIAGNOSIS — R53.82 CHRONIC FATIGUE: ICD-10-CM

## 2022-10-06 DIAGNOSIS — I26.99 OTHER ACUTE PULMONARY EMBOLISM WITHOUT ACUTE COR PULMONALE (HCC): ICD-10-CM

## 2022-10-06 DIAGNOSIS — M54.17 LUMBOSACRAL RADICULITIS: ICD-10-CM

## 2022-10-06 DIAGNOSIS — R60.0 EDEMA OF BOTH LOWER EXTREMITIES: ICD-10-CM

## 2022-10-06 DIAGNOSIS — M48.062 SPINAL STENOSIS OF LUMBAR REGION WITH NEUROGENIC CLAUDICATION: ICD-10-CM

## 2022-10-06 LAB
GFR SERPL CREATININE-BSD FRML MDRD: > 90 ML/MIN/1.73M2
GFR SERPL CREATININE-BSD FRML MDRD: > 90 ML/MIN/1.73M2

## 2022-10-06 PROCEDURE — G8427 DOCREV CUR MEDS BY ELIG CLIN: HCPCS | Performed by: NURSE PRACTITIONER

## 2022-10-06 PROCEDURE — 1036F TOBACCO NON-USER: CPT | Performed by: NURSE PRACTITIONER

## 2022-10-06 PROCEDURE — 99213 OFFICE O/P EST LOW 20 MIN: CPT | Performed by: NURSE PRACTITIONER

## 2022-10-06 PROCEDURE — G8484 FLU IMMUNIZE NO ADMIN: HCPCS | Performed by: NURSE PRACTITIONER

## 2022-10-06 PROCEDURE — G8417 CALC BMI ABV UP PARAM F/U: HCPCS | Performed by: NURSE PRACTITIONER

## 2022-10-06 PROCEDURE — 99211 OFF/OP EST MAY X REQ PHY/QHP: CPT

## 2022-10-06 PROCEDURE — 3017F COLORECTAL CA SCREEN DOC REV: CPT | Performed by: NURSE PRACTITIONER

## 2022-10-06 NOTE — PATIENT INSTRUCTIONS
Obtain compression stockings  Return to clinic in 3 months with repeat labs  Notify office of any new or worsening symptoms

## 2022-10-06 NOTE — ONCOLOGY
Oncology Social Work    Date: 10/6/2022  Name: Lyndsey Dozier  MRN: [id number]     Contact Type: Face-to-Face Med Onc    Note:    met with patient following appointment. Patient needed a ride back to his apartment.  set up [employer] for today. SW escorted patient to check out. Patient provided social workers card for any transportation needs in the future.  will continue to follow up as needed.       RANJEET Dahl, [place]  Oncology Social Worker

## 2022-10-06 NOTE — TELEPHONE ENCOUNTER
Maria Teresa Carrillo called requesting a refill on the following medications:  Requested Prescriptions     Pending Prescriptions Disp Refills    HYDROcodone-acetaminophen (NORCO) 5-325 MG per tablet 120 tablet 0     Sig: Take 1 tablet by mouth every 6 hours as needed for Pain for up to 30 days.      Pharmacy verified:  [facility]      Date of last visit: 09-26-22  Date of next visit (if applicable): Visit date not found

## 2022-10-06 NOTE — PROGRESS NOTES
[address] [place] [address]  Dept: [phone]  Loc: [phone]       Visit Date:10/6/2022     Delfino Mckeon is a 46 y.o. male who presents today for:   Chief Complaint   Patient presents with    Follow-up     Acute deep vein thrombosis (DVT) of calf muscle vein of left lower extremity (HCC)        HPI:   Delfino Mckeon is a 46 y.o. male referred to Hematology/Oncology clinic by Dr. Garcia Martinez for evaluation of Acute DVT with Pulmonary embolism. Patient reports a prolonged hospitalization 08/2020 due to Covid 19. He received treatment with Decadron, Remdesivir and Azithromycin. He was discharged to [facility]. He was again admitted for sepsis following Covid pneumonia. He has a complicated health history. He developed a DVT of the LLE with PE. Interval History 10/6/2022: Patient here for follow-up on his DVT with PE and anticoagulation. He is currently on treatment with Coumadin 10 mg by mouth daily. He follows with Coumadin clinic. He complains of severe weakness. He uses a wheeled walker to assist with ambulation. He denies any recent falls or trauma. He denies abnormal bleeding; no epistaxis, gingival bleeding, hemoptysis, hematemesis, hematuria, hematochezia, melena. No chest pain or cough. He complains of SOB with minimal exertion. He has BLE edema L > R.  9/15/2022 coagulopathy work-up results reveal B2 glycoprotein 1 IgG and IgM AB are less than 10. Cardiolipin antibody IgG less than 10, negative findings. Patient is negative for lupus anticoagulant. Protein C and protein S and Antithrombin activity are within normal limits. He is negative prothrombin gene mutation or factor V Leiden mutation. PMH, SH, and FH:  I reviewed the patients medication list and allergy list as noted on the electronic medical record. The PMH, SH and FH were also reviewed as noted on the EMR.       Review of Systems:   Review of Systems   Pertinent review of systems noted in HPI, all other ROS negative. Objective:   Physical Exam   BP (!) 147/78 (Site: Left Upper Arm, Position: Sitting, Cuff Size: Medium Adult)   Pulse 73   Temp 98.7 °F (37.1 °C) (Oral)   Resp 18   Ht 5' 8\" (1.727 m)   Wt 296 lb (134.3 kg)   SpO2 95%   BMI 45.01 kg/m²    General appearance: No apparent distress, anxious and cooperative. HEENT: Pupils equal, round, and reactive to light. Conjunctivae/corneas clear. Oral mucosa moist and intact  Neck: Supple, with full range of motion. Trachea midline. Respiratory:  Normal respiratory effort. Clear to auscultation, bilaterally without Rales/Wheezes/Rhonchi. Cardiovascular: Regular rate and rhythm with normal S1/S2 without murmurs, rubs or gallops. Abdomen: Soft, non-tender, non-distended with active bowel sounds. Musculoskeletal: No clubbing, cyanosis or edema bilaterally. Skin: Skin color, texture, turgor normal.  No visible rashes or lesions. Neurologic:  Neurovascularly intact without any focal sensory/motor deficits.    Psychiatric: Alert and oriented, thought content appropriate, normal insight  Capillary Refill: Brisk,< 3 seconds   Peripheral Pulses: +2 palpable, equal bilaterally       Imaging Studies and Labs:   CBC:   Lab Results   Component Value Date    WBC 9.9 09/12/2022    HGB 14.5 09/12/2022    HCT 43.0 09/12/2022    MCV 90.9 09/12/2022     09/12/2022     BMP:   Lab Results   Component Value Date/Time     09/12/2022 04:42 PM    K 4.0 09/12/2022 04:42 PM    K 4.2 03/10/2022 08:35 PM     09/12/2022 04:42 PM    CO2 26 09/12/2022 04:42 PM    BUN 15 09/12/2022 04:42 PM    CREATININE 1.0 09/12/2022 04:42 PM    GLUCOSE 247 09/12/2022 04:42 PM    GLUCOSE 113 06/24/2018 12:11 PM    CALCIUM 9.7 09/12/2022 04:42 PM      LFT:   Lab Results   Component Value Date    ALT 52 09/06/2022    AST 44 (H) 09/06/2022    ALKPHOS 140 (H) 09/06/2022    BILITOT 0.5 09/06/2022

## 2022-10-07 RX ORDER — HYDROCODONE BITARTRATE AND ACETAMINOPHEN 5; 325 MG/1; MG/1
1 TABLET ORAL EVERY 6 HOURS PRN
Qty: 120 TABLET | Refills: 0 | Status: SHIPPED | OUTPATIENT
Start: 2022-10-07 | End: 2022-11-03 | Stop reason: SDUPTHER

## 2022-10-07 NOTE — TELEPHONE ENCOUNTER
OARRS reviewed. UDS: + for Aripiprazole. Citalopram.   Negative for Hydrocodone. Last seen: 9/26/2022.  Follow-up:   Future Appointments   Date Time Provider Anamika Lyndsey   10/12/2022 10:20 AM RERE Jacome [facility]   10/18/2022  2:20 PM Jeff Figueroa DO N [facility] Rheum [facility]   10/19/2022  2:00 PM August Anglin MD [facility]   12/19/2022  2:15 PM Venessa Bearden MD N [facility] Heart [facility]   1/6/2023 10:20 AM SANDRA Freedman - CNP N Oncology [facility]

## 2022-10-12 ENCOUNTER — HOSPITAL ENCOUNTER (OUTPATIENT)
Dept: PHARMACY | Age: 52
Setting detail: THERAPIES SERIES
Discharge: HOME | End: 2022-10-12
Payer: MEDICARE

## 2022-10-12 VITALS — HEART RATE: 62 BPM | DIASTOLIC BLOOD PRESSURE: 97 MMHG | SYSTOLIC BLOOD PRESSURE: 160 MMHG

## 2022-10-12 DIAGNOSIS — I26.99 PULMONARY EMBOLISM, UNSPECIFIED CHRONICITY, UNSPECIFIED PULMONARY EMBOLISM TYPE, UNSPECIFIED WHETHER ACUTE COR PULMONALE PRESENT (HCC): ICD-10-CM

## 2022-10-12 DIAGNOSIS — Z51.81 ENCOUNTER FOR THERAPEUTIC DRUG MONITORING: ICD-10-CM

## 2022-10-12 DIAGNOSIS — Z79.01 ANTICOAGULATED ON COUMADIN: ICD-10-CM

## 2022-10-12 DIAGNOSIS — I48.91 ATRIAL FIBRILLATION, UNSPECIFIED TYPE (HCC): Primary | ICD-10-CM

## 2022-10-12 LAB
HCT VFR BLD CALC: 44.7 % (ref 41–53)
HEMOGLOBIN: 14.9 G/DL (ref 13.5–17.5)
INR BLD: 5.62

## 2022-10-12 PROCEDURE — 85610 PROTHROMBIN TIME: CPT

## 2022-10-12 PROCEDURE — 99212 OFFICE O/P EST SF 10 MIN: CPT

## 2022-10-12 PROCEDURE — 36416 COLLJ CAPILLARY BLOOD SPEC: CPT

## 2022-10-12 NOTE — PROGRESS NOTES
Medication Management [address]   [phone] (phone)  [phone] (fax)    Mr. Carlos Benjamin is a 46 y.o.  male with history of PE, Afib who presents today for anticoagulation monitoring and adjustment. Patient verifies current dosing regimen and tablet strength. Unsure of exact dosage but claims he uses both 10 mg and 7.5 mg doses. No missed or extra doses. Patient denies s/s bleeding/bruising/swelling/SOB/chest pain. No blood in urine or stool.  - Blood in stool this morning, but inactive, pain meds, and history of rectal absess. .. Denies black/tarry stool, and denies hemoptysis  No dietary changes. No changes in medication/OTC agents/Herbals. No change in alcohol use or tobacco use. No change in activity level. More active lately (walking)  Patient denies headaches/dizziness/lightheadedness/falls. No vomiting/diarrhea or acute illness. No Procedures scheduled in the future at this time. Patient appears to be in and out of sleep, answers questions but also mumbles randomly, cannot make out some words. Feels achey and sleepy right now. Pt does not feel like he needs to go to E. Pt does nto drive (uses transportation services for apptmts). Called his emergency contact, who confirmed this is pt's usual affect and that between pain meds, KIARA, and not sleeping well he is very somnolent during the day on a typical basis.   CBC Latest Ref Rng & Units 10/12/2022 9/12/2022 9/6/2022   HEMOGLOBIN 13.5 - 17.5 g/dL 14.9 14.5 13.5 (L)   HEMATOCRIT 41 - 53 % 44.7 43.0 42.0     CBC Latest Ref Rng & Units 5/26/2022 3/10/2022 3/4/2022   HEMOGLOBIN 13.5 - 17.5 g/dL 16.6 15.4 16.0   HEMATOCRIT 41 - 53 % 49.0 46.0 49.5     CBC Latest Ref Rng & Units 1/6/2022 1/5/2022   HEMOGLOBIN 13.5 - 17.5 g/dL 15.4 15.7   HEMATOCRIT 41 - 53 % 48.6 51.1     Vitals 10/12/2022 10/6/2022 10/6/2022 9/26/2022 6/17/4394   SYSTOLIC 389 688 346 232 852   DIASTOLIC 97 78 78 84 87 Vitals 9/15/2022 9/15/2022 9/12/2022 3/44/5532   SYSTOLIC 086 781 393 010   DIASTOLIC 71 71 497 070       Issues with inpatient lab results crossing into Epic. They faxed the results and they have been entered as \"external\" from outpatient express. Assessment:   Lab Results   Component Value Date    INR 5.62 10/12/2022    INR 1.70 (H) 09/28/2022    INR 2.40 (H) 09/23/2022     INR supratherapeutic   Recent Labs     10/12/22  0000   INR 5.62       Patient interview completed and discussed with pharmacist by  Justina Javier PharmD Candidate 2023  10/12/2022 4:02 PM        Plan:  Hold x2, 5 mg x1,  7.5 mg x2. Recheck INR 10/17/22. Patient reminded to call the Anticoagulation Clinic with any signs or symptoms of bleeding or with any medication changes. Patient given instructions utilizing the teach back method. After visit summary printed and reviewed with patient. Discharged in wheelchair and provided transportation home somnolent, but in no apparent distress.

## 2022-10-17 ENCOUNTER — HOSPITAL ENCOUNTER (OUTPATIENT)
Dept: PHARMACY | Age: 52
Setting detail: THERAPIES SERIES
Discharge: HOME | End: 2022-10-17
Payer: MEDICARE

## 2022-10-17 DIAGNOSIS — Z51.81 ENCOUNTER FOR THERAPEUTIC DRUG MONITORING: ICD-10-CM

## 2022-10-17 DIAGNOSIS — Z79.01 ANTICOAGULATED ON COUMADIN: Primary | ICD-10-CM

## 2022-10-17 LAB — POC INR: 2.3 (ref 0.8–1.2)

## 2022-10-17 PROCEDURE — 99212 OFFICE O/P EST SF 10 MIN: CPT | Performed by: PHARMACIST

## 2022-10-17 PROCEDURE — 85610 PROTHROMBIN TIME: CPT | Performed by: PHARMACIST

## 2022-10-17 PROCEDURE — 36416 COLLJ CAPILLARY BLOOD SPEC: CPT | Performed by: PHARMACIST

## 2022-10-17 RX ORDER — ATORVASTATIN CALCIUM 40 MG/1
40 TABLET, FILM COATED ORAL NIGHTLY
Qty: 90 TABLET | OUTPATIENT
Start: 2022-10-17

## 2022-10-17 RX ORDER — WARFARIN SODIUM 5 MG/1
TABLET ORAL
Qty: 60 TABLET | Refills: 1 | Status: SHIPPED | OUTPATIENT
Start: 2022-10-17

## 2022-10-17 RX ORDER — WARFARIN SODIUM 5 MG/1
TABLET ORAL
Qty: 60 TABLET | Refills: 1 | Status: SHIPPED | OUTPATIENT
Start: 2022-10-17 | End: 2022-10-17 | Stop reason: SDUPTHER

## 2022-10-17 NOTE — PROGRESS NOTES
Medication Management [address]  [phone] (phone)  [phone] (fax)    Mr. Maggi Merrill is a 46 y.o.  male with history of PE, Afib who presents today for anticoagulation monitoring and adjustment. Patient verifies current dosing regimen and tablet strength. Patient was instructed to Hold x2 days followed by 5mg x1 followed by 7.5mg x 2 days. Per patient he held x2 days then took 5mg x daily. He said a nurse helps him fill his pill box. Patient denies s/s bleeding/bruising/swelling/SOB/chest pain Left lower leg he has a cut from hitting his leg. He is going to continue to watch to make sure it gets better. No blood in urine or stool. Some blood in stool - associating it with hemorrhoids. No dietary changes. No changes in medication/OTC agents/Herbals. No change in alcohol use or tobacco use. Change in activity level. Less active due to his left foot hurting. Patient denies headaches/dizziness/lightheadedness/falls. No vomiting/diarrhea or acute illness. No Procedures scheduled in the future at this time. Has foot procedure this Wednesday 10/19 at [facility] with Dr. Nano Gonzalez MD neurologist. Dary Steen their office and they will be doing an EMG on the foot and patient does not need to hold his Coumadin. Patient states no date set up for lumbar injection he wants to take care of his foot first.      Assessment:   Lab Results   Component Value Date    INR 2.30 (H) 10/17/2022    INR 5.62 10/12/2022    INR 1.70 (H) 09/28/2022     INR therapeutic   Recent Labs     10/17/22  1258   INR 2.30*   1st therapeutic INR following a 2 day hold due to a supratherapeutic INR. Patient has received a total of 32.5 mg over the last 7 days. Previously patient was therapeutic x2 readings after receiving 55 mg a week of Coumadin. Plan:  Decrease to Coumadin 5mg MWF 7.5mg TuThSS.  (This will be Coumadin 45mg a week) Recheck INR in 1 week Patient has another appointment this day as well. Refill sent to [facility] aid for 5mg tablets. I called [facility] and they will deliver to patient. They deliver on MWF. Per Carlos Gan he has enough to get him through this week. He will let his nurse know to update his pill box. Patient reminded to call the Anticoagulation Clinic with any signs or symptoms of bleeding or with any medication changes. Patient given instructions utilizing the teach back method. After visit summary printed and reviewed with patient. Discharged in wheelchair in no apparent distress.       For Pharmacy Admin Tracking Only    Intervention Detail: Dose Adjustment: 1, reason: Therapy Optimization and Refill(s) Provided  Total # of Interventions Recommended: 1  Total # of Interventions Accepted: 1  Time Spent (min): 20

## 2022-10-19 DIAGNOSIS — K21.9 GASTROESOPHAGEAL REFLUX DISEASE, UNSPECIFIED WHETHER ESOPHAGITIS PRESENT: Primary | ICD-10-CM

## 2022-10-19 DIAGNOSIS — I10 HYPERTENSION, ESSENTIAL: ICD-10-CM

## 2022-10-19 DIAGNOSIS — E78.5 DYSLIPIDEMIA: ICD-10-CM

## 2022-10-21 RX ORDER — HYDRALAZINE HYDROCHLORIDE 50 MG/1
50 TABLET, FILM COATED ORAL 3 TIMES DAILY
Qty: 90 TABLET | Refills: 3 | Status: SHIPPED | OUTPATIENT
Start: 2022-10-21

## 2022-10-25 ENCOUNTER — APPOINTMENT (OUTPATIENT)
Dept: PHARMACY | Age: 52
End: 2022-10-25
Payer: MEDICARE

## 2022-10-26 ENCOUNTER — HOSPITAL ENCOUNTER (OUTPATIENT)
Dept: PHARMACY | Age: 52
Setting detail: THERAPIES SERIES
Discharge: HOME | End: 2022-10-26
Payer: MEDICARE

## 2022-10-26 DIAGNOSIS — Z79.01 ANTICOAGULATED ON COUMADIN: ICD-10-CM

## 2022-10-26 DIAGNOSIS — I26.99 PULMONARY EMBOLISM, UNSPECIFIED CHRONICITY, UNSPECIFIED PULMONARY EMBOLISM TYPE, UNSPECIFIED WHETHER ACUTE COR PULMONALE PRESENT (HCC): ICD-10-CM

## 2022-10-26 DIAGNOSIS — Z51.81 ENCOUNTER FOR THERAPEUTIC DRUG MONITORING: ICD-10-CM

## 2022-10-26 DIAGNOSIS — I48.91 ATRIAL FIBRILLATION, UNSPECIFIED TYPE (HCC): Primary | ICD-10-CM

## 2022-10-26 LAB — POC INR: 1.1 (ref 0.8–1.2)

## 2022-10-26 PROCEDURE — 85610 PROTHROMBIN TIME: CPT

## 2022-10-26 PROCEDURE — 99213 OFFICE O/P EST LOW 20 MIN: CPT

## 2022-10-26 PROCEDURE — 36416 COLLJ CAPILLARY BLOOD SPEC: CPT

## 2022-10-26 RX ORDER — ENOXAPARIN SODIUM 150 MG/ML
INJECTION SUBCUTANEOUS
Qty: 10 ML | Refills: 1 | Status: SHIPPED | OUTPATIENT
Start: 2022-10-26 | End: 2022-10-31

## 2022-10-26 NOTE — PROGRESS NOTES
Medication Management [address]  [phone] (phone)  [phone] (fax)    Mr. Luisito Mccarthy is a 45 y.o.  male with history of PE, Afib who presents today for anticoagulation monitoring and adjustment. Patient verifies current dosing regimen and tablet strength. No missed or extra doses. Patient denies s/s bleeding/bruising/swelling/SOB/chest pain. SOB increased 2-3 days  No blood in urine or stool. No dietary changes. No changes in medication/OTC agents/Herbals. No change in alcohol use or tobacco use. No change in activity level. Less activity d/t SOB. Patient denies headaches/dizziness/lightheadedness/falls. No vomiting/diarrhea or acute illness. No Procedures scheduled in the future at this time. Epidural in back on 12/13 @ [facility] building    Assessment:   Lab Results   Component Value Date    INR 1.10 10/26/2022    INR 2.30 (H) 10/17/2022    INR 5.62 10/12/2022     INR subtherapeutic   Recent Labs     10/26/22  1314   INR 1.10     Patient interview completed and discussed with pharmacist by  Nabil Whittington PharmD Candidate 2023  10/26/2022 1:19 PM          Plan:  Xiao Maria CNP - referring provider regarding pt's increased shortness of breath and today's INR of 1.1. H/o PE last month. Spoke to Aquiles, her assistant. He missed an apptmt with [facility] 2 days ago (no-showed, per Aquiles). Their office will call pt with further instructions in addition to the following instructions provided by this clinic. Start Lovenox  150 mg bid. (Rx sent to pt's pharmacy). He expresses he has means to  the Rx yet today. Take warfarin 15 mg x2, then 7.5 mg x3. Recheck INR 10/31/22. After visit summary printed and reviewed with patient. Discharged in wheelchair in no apparent distress.     For Pharmacy Admin Tracking Only    Intervention Detail: Dose Adjustment: 3, reason: Therapy Optimization and Lab(s) Ordered  Total # of Interventions Recommended: 2  Total # of Interventions Accepted: 2  Time Spent (min): 20

## 2022-10-31 ENCOUNTER — HOSPITAL ENCOUNTER (OUTPATIENT)
Dept: PHARMACY | Age: 52
Setting detail: THERAPIES SERIES
Discharge: HOME | End: 2022-10-31
Payer: MEDICARE

## 2022-10-31 DIAGNOSIS — I48.91 ATRIAL FIBRILLATION, UNSPECIFIED TYPE (HCC): Primary | ICD-10-CM

## 2022-10-31 DIAGNOSIS — Z51.81 ENCOUNTER FOR THERAPEUTIC DRUG MONITORING: ICD-10-CM

## 2022-10-31 DIAGNOSIS — I26.99 PULMONARY EMBOLISM, UNSPECIFIED CHRONICITY, UNSPECIFIED PULMONARY EMBOLISM TYPE, UNSPECIFIED WHETHER ACUTE COR PULMONALE PRESENT (HCC): ICD-10-CM

## 2022-10-31 DIAGNOSIS — Z79.01 ANTICOAGULATED ON COUMADIN: ICD-10-CM

## 2022-10-31 LAB — POC INR: 2.1 (ref 0.8–1.2)

## 2022-10-31 PROCEDURE — 36416 COLLJ CAPILLARY BLOOD SPEC: CPT

## 2022-10-31 PROCEDURE — 99211 OFF/OP EST MAY X REQ PHY/QHP: CPT

## 2022-10-31 PROCEDURE — 85610 PROTHROMBIN TIME: CPT

## 2022-10-31 NOTE — PROGRESS NOTES
Medication Management [address]  [phone] (phone)  [phone] (fax)    Mr. Yaya Vasquez is a 45 y.o.  male with history of PE, Afib who presents today for anticoagulation monitoring and adjustment. Patient verifies current dosing regimen and tablet strength. No extra doses. Patient states he only took Coumadin 10 mg x 2 doses (10/26/22-10/27/22 when he was instructed to take 15 mg). Patient also reports that he missed his dose yesterday 10/30/22 (7.5 mg). Patient denies s/s bleeding/bruising/swelling/chest pain. Patient reports that his SOB has improved. No blood in urine or stool. No dietary changes. No changes in OTC agents/Herbals. Patient is no longer on Celexa. He states he takes Lexapro 20 mg daily. No change in alcohol use or tobacco use. Patient has been slightly more active. Patient denies headaches/dizziness/lightheadedness/falls. No vomiting/diarrhea or acute illness. Patient states he will be having lumbar injections on 12/13/22 and requires a 5 day hold of Coumadin with Lovenox bridge. Assessment:   Lab Results   Component Value Date    INR 2.10 (H) 10/31/2022    INR 1.10 10/26/2022    INR 2.30 (H) 10/17/2022     INR therapeutic   Recent Labs     10/31/22  1052   INR 2.10*     Patient has a recent history of labile INRs, but is therapeutic today. Plan:  Stop Lovenox. Continue Coumadin 7.5 mg daily. Recheck INR in 1 week(s). Patient reminded to call the Anticoagulation Clinic with any signs or symptoms of bleeding or with any medication changes. Patient given instructions utilizing the teach back method. After visit summary printed and reviewed with patient. Discharged via wheelchair  in no apparent distress.     For Pharmacy Admin Tracking Only    Intervention Detail: Dose Adjustment: 1, reason: Therapy De-escalation  Total # of Interventions Recommended: 1  Total # of Interventions Accepted: 1  Time Spent (min): 6052  [facility], [place], BCPS  10/31/2022  11:22 AM

## 2022-11-02 DIAGNOSIS — M48.062 SPINAL STENOSIS OF LUMBAR REGION WITH NEUROGENIC CLAUDICATION: ICD-10-CM

## 2022-11-02 DIAGNOSIS — M54.17 LUMBOSACRAL RADICULITIS: ICD-10-CM

## 2022-11-02 DIAGNOSIS — M47.816 LUMBAR SPONDYLOSIS: ICD-10-CM

## 2022-11-02 DIAGNOSIS — G89.4 CHRONIC PAIN SYNDROME: ICD-10-CM

## 2022-11-02 NOTE — TELEPHONE ENCOUNTER
[facility] called requesting a refill on the following medications:  Requested Prescriptions     Pending Prescriptions Disp Refills    HYDROcodone-acetaminophen (NORCO) 5-325 MG per tablet 120 tablet 0     Sig: Take 1 tablet by mouth every 6 hours as needed for Pain for up to 30 days.      Pharmacy verified:[facility] [address]      Date of last visit: 9/26/22  Date of next visit (if applicable): 36/46/7291

## 2022-11-03 RX ORDER — HYDROCODONE BITARTRATE AND ACETAMINOPHEN 5; 325 MG/1; MG/1
1 TABLET ORAL EVERY 6 HOURS PRN
Qty: 28 TABLET | Refills: 0 | Status: SHIPPED | OUTPATIENT
Start: 2022-11-06 | End: 2022-11-13

## 2022-11-08 ENCOUNTER — HOSPITAL ENCOUNTER (OUTPATIENT)
Dept: PHARMACY | Age: 52
Setting detail: THERAPIES SERIES
Discharge: HOME | End: 2022-11-08
Payer: MEDICARE

## 2022-11-08 VITALS — SYSTOLIC BLOOD PRESSURE: 144 MMHG | HEART RATE: 73 BPM | DIASTOLIC BLOOD PRESSURE: 96 MMHG

## 2022-11-08 DIAGNOSIS — Z51.81 ENCOUNTER FOR THERAPEUTIC DRUG MONITORING: ICD-10-CM

## 2022-11-08 DIAGNOSIS — I26.99 ACUTE PULMONARY EMBOLISM, UNSPECIFIED PULMONARY EMBOLISM TYPE, UNSPECIFIED WHETHER ACUTE COR PULMONALE PRESENT (HCC): ICD-10-CM

## 2022-11-08 DIAGNOSIS — I48.91 ATRIAL FIBRILLATION, UNSPECIFIED TYPE (HCC): Primary | ICD-10-CM

## 2022-11-08 DIAGNOSIS — Z79.01 ANTICOAGULATED ON COUMADIN: ICD-10-CM

## 2022-11-08 LAB
HCT VFR BLD CALC: 44.2 % (ref 42–52)
HEMOGLOBIN: 14.4 GM/DL (ref 14–18)
INR BLD: 6.3 (ref 0.85–1.13)

## 2022-11-08 PROCEDURE — 99212 OFFICE O/P EST SF 10 MIN: CPT

## 2022-11-08 PROCEDURE — 85018 HEMOGLOBIN: CPT

## 2022-11-08 PROCEDURE — 85610 PROTHROMBIN TIME: CPT

## 2022-11-08 PROCEDURE — 85014 HEMATOCRIT: CPT

## 2022-11-08 PROCEDURE — 36416 COLLJ CAPILLARY BLOOD SPEC: CPT

## 2022-11-08 NOTE — PROGRESS NOTES
Medication Management [address]   [phone] (phone)  [phone] (fax)    Mr. Corbin Clinton is a 45 y.o.  male with history of PE, Afib who presents today for anticoagulation monitoring and adjustment. Patient verifies current dosing regimen and tablet strength. No missed or extra doses. Patient denies s/s bleeding/bruising/swelling/SOB/chest pain  No blood in urine or stool. No dietary changes. No changes in medication/OTC agents/Herbals. No change in alcohol use or tobacco use. No change in activity level. Patient denies headaches/dizziness/lightheadedness/falls. No vomiting/diarrhea or acute illness. Procedures scheduled in the future at this time. Patient states he will be having lumbar injections on 22 and requires a 5 day hold of Coumadin with Lovenox bridge. Assessment:   Lab Results   Component Value Date    INR 6.30 (HH) 2022    INR 2.10 (H) 10/31/2022    INR 1.10 10/26/2022     INR supratherapeutic   Recent Labs     22  1025   INR 6.30*   POC = 6.4    Vitals:    22 1033   BP: (!) 144/96   Pulse: 73      Latest Reference Range & Units 22 10:24   Hemoglobin Quant 14.0 - 18.0 gm/dl 14.4   Hematocrit 42.0 - 52.0 % 44.2     Plan:  Hold Coumadin today and tomorrow, then take Coumadin 2.5 mg on Thursday (11/10). Recheck INR in 3 day(s). Patient reminded to call the Anticoagulation Clinic with any signs or symptoms of bleeding or with any medication changes. Patient given instructions utilizing the teach back method. After visit summary printed and reviewed with patient. Discharged ambulatory in no apparent distress.     For Pharmacy Admin Tracking Only    Intervention Detail: Adherence Monitorin, Dose Adjustment: 1, reason: Therapy Optimization, and Lab(s) Ordered  Total # of Interventions Recommended: 3  Total # of Interventions Accepted: 3  Time Spent (min): 20    Aguila Hudson, NomanD, [facility]  11/8/2022  12:10 PM

## 2022-11-11 ENCOUNTER — HOSPITAL ENCOUNTER (OUTPATIENT)
Dept: PHARMACY | Age: 52
Setting detail: THERAPIES SERIES
Discharge: HOME | End: 2022-11-11
Payer: MEDICARE

## 2022-11-11 DIAGNOSIS — Z79.01 ANTICOAGULATED ON COUMADIN: ICD-10-CM

## 2022-11-11 DIAGNOSIS — I26.99 PULMONARY EMBOLISM, UNSPECIFIED CHRONICITY, UNSPECIFIED PULMONARY EMBOLISM TYPE, UNSPECIFIED WHETHER ACUTE COR PULMONALE PRESENT (HCC): ICD-10-CM

## 2022-11-11 DIAGNOSIS — I48.91 ATRIAL FIBRILLATION, UNSPECIFIED TYPE (HCC): Primary | ICD-10-CM

## 2022-11-11 DIAGNOSIS — Z51.81 ENCOUNTER FOR THERAPEUTIC DRUG MONITORING: ICD-10-CM

## 2022-11-11 LAB — POC INR: 2.4 (ref 0.8–1.2)

## 2022-11-11 PROCEDURE — 85610 PROTHROMBIN TIME: CPT | Performed by: PHARMACIST

## 2022-11-11 PROCEDURE — 36416 COLLJ CAPILLARY BLOOD SPEC: CPT | Performed by: PHARMACIST

## 2022-11-11 PROCEDURE — 99211 OFF/OP EST MAY X REQ PHY/QHP: CPT | Performed by: PHARMACIST

## 2022-11-17 ENCOUNTER — HOSPITAL ENCOUNTER (OUTPATIENT)
Dept: PHARMACY | Age: 52
Setting detail: THERAPIES SERIES
Discharge: HOME | End: 2022-11-17
Payer: MEDICARE

## 2022-11-17 DIAGNOSIS — Z79.01 ANTICOAGULATED ON COUMADIN: ICD-10-CM

## 2022-11-17 DIAGNOSIS — Z51.81 ENCOUNTER FOR THERAPEUTIC DRUG MONITORING: ICD-10-CM

## 2022-11-17 DIAGNOSIS — I48.91 ATRIAL FIBRILLATION, UNSPECIFIED TYPE (HCC): Primary | ICD-10-CM

## 2022-11-17 DIAGNOSIS — I26.99 PULMONARY EMBOLISM, UNSPECIFIED CHRONICITY, UNSPECIFIED PULMONARY EMBOLISM TYPE, UNSPECIFIED WHETHER ACUTE COR PULMONALE PRESENT (HCC): ICD-10-CM

## 2022-11-17 LAB — POC INR: 3.8 (ref 0.8–1.2)

## 2022-11-17 PROCEDURE — 36416 COLLJ CAPILLARY BLOOD SPEC: CPT | Performed by: PHARMACIST

## 2022-11-17 PROCEDURE — 99212 OFFICE O/P EST SF 10 MIN: CPT | Performed by: PHARMACIST

## 2022-11-17 PROCEDURE — 85610 PROTHROMBIN TIME: CPT | Performed by: PHARMACIST

## 2022-11-17 NOTE — PROGRESS NOTES
Medication Management [address]  [phone] (phone)  [phone] (fax)    Mr. Joyce Lyons is a 45 y.o.  male with history of PE, Afib who presents today for anticoagulation monitoring and adjustment. Patient verifies current dosing regimen and tablet strength. No missed or extra doses. Patient denies s/s bleeding/bruising/swelling/SOB/chest pain  No blood in urine or stool. No dietary changes. No changes in medication/OTC agents/Herbals. No change in alcohol use or tobacco use. No change in activity level. Patient denies headaches/dizziness/lightheadedness/falls. No vomiting/diarrhea or acute illness. Patient states he will be having lumbar injections on 12/13/22 and requires a 5 day hold of Coumadin with Lovenox bridge. Assessment:   Lab Results   Component Value Date    INR 3.80 (H) 11/17/2022    INR 2.40 (H) 11/11/2022    INR 6.30 (HH) 11/08/2022     INR supratherapeutic   Recent Labs     11/17/22  1325   INR 3.80*       History of labile INRs. Received 40mg warfarin in the past 7 days. Plan:  Coumadin 2.5mg today, then decrease Coumadin 5mg daily. Recheck INR in 6 day(s). Patient reminded to call the Anticoagulation Clinic with signs or symptoms of bleeding or with any medication changes. Patient given instructions utilizing the teach back method. After visit summary printed and reviewed with patient. Discharged in [facility] in no apparent distress.       For Pharmacy Admin Tracking Only    Intervention Detail: Dose Adjustment: 1, reason: Therapy De-escalation  Total # of Interventions Recommended: 1  Total # of Interventions Accepted: 1  Time Spent (min): 20

## 2022-11-23 ENCOUNTER — HOSPITAL ENCOUNTER (OUTPATIENT)
Dept: PHARMACY | Age: 52
Setting detail: THERAPIES SERIES
Discharge: HOME | End: 2022-11-23
Payer: MEDICARE

## 2022-11-23 DIAGNOSIS — Z51.81 ENCOUNTER FOR THERAPEUTIC DRUG MONITORING: ICD-10-CM

## 2022-11-23 DIAGNOSIS — I48.91 ATRIAL FIBRILLATION, UNSPECIFIED TYPE (HCC): Primary | ICD-10-CM

## 2022-11-23 DIAGNOSIS — I26.99 PULMONARY EMBOLISM, UNSPECIFIED CHRONICITY, UNSPECIFIED PULMONARY EMBOLISM TYPE, UNSPECIFIED WHETHER ACUTE COR PULMONALE PRESENT (HCC): ICD-10-CM

## 2022-11-23 DIAGNOSIS — Z79.01 ANTICOAGULATED ON COUMADIN: ICD-10-CM

## 2022-11-23 LAB — POC INR: 3.1 (ref 0.8–1.2)

## 2022-11-23 PROCEDURE — 99211 OFF/OP EST MAY X REQ PHY/QHP: CPT | Performed by: PHARMACIST

## 2022-11-23 PROCEDURE — 85610 PROTHROMBIN TIME: CPT | Performed by: PHARMACIST

## 2022-11-23 PROCEDURE — 36416 COLLJ CAPILLARY BLOOD SPEC: CPT | Performed by: PHARMACIST

## 2022-11-23 NOTE — PROGRESS NOTES
Medication Management [address]  [phone] (phone)  [phone] (fax)    Mr. Herson Branham is a 45 y.o.  male with history of PE, Afib who presents today for anticoagulation monitoring and adjustment. Patient verifies current dosing regimen and tablet strength. No missed or extra doses. Patient denies s/s bleeding/bruising/swelling/SOB/chest pain  No blood in urine or stool. No dietary changes. No changes in medication/OTC agents/Herbals. No change in alcohol use or tobacco use. Increased activity with [employer] activities  Patient denies headaches/dizziness/lightheadedness/falls. No vomiting/diarrhea or acute illness. Lumbar injections on 12/13/22 and requires a 5 day hold of Coumadin with Lovenox bridge. Assessment:   Lab Results   Component Value Date    INR 3.10 (H) 11/23/2022    INR 3.80 (H) 11/17/2022    INR 2.40 (H) 11/11/2022     INR supratherapeutic   Recent Labs     11/23/22  1405   INR 3.10*      Patient interview conducted by student pharmacist, reviewed with [facility] Senior 11/23/22 2:06 PM     Plan:  Decrease Coumadin 2.5mg W 5 mg SMTuThFS. 7.1% decrease (from 5mg daily) Recheck INR in 1 week. Patient reminded to call the Anticoagulation Clinic with any signs or symptoms of bleeding or with any medication changes. Patient given instructions utilizing the teach back method. After visit summary printed and reviewed with patient. Discharged ambulatory in no apparent distress.     For Pharmacy Admin Tracking Only    Intervention Detail: Dose Adjustment: 1, reason: Therapy Optimization  Total # of Interventions Recommended: 1  Total # of Interventions Accepted: 1  Time Spent (min): 20

## 2022-11-29 ENCOUNTER — TELEPHONE (OUTPATIENT)
Dept: PHARMACY | Age: 52
End: 2022-11-29

## 2022-11-29 NOTE — TELEPHONE ENCOUNTER
Bridging instructions for lumbar injection scheduled for 12/13/22 d/t patient history of PE/afib. Provider requesting 5 day hold of coumadin for procedure.     ABW:  134.3 kg  Calculated CrCl:  160 ml/min  Plt:  237 (11/8/22)  Lovenox dose:  150 mg     Medication Management [facility] Instruction Sheet  Patient:   Corrie Wood      YOB: 1970    Procedure:  Lumbar injection      Date of Procedure:  12/13/22    Day Lovenox AM Lovenox PM Coumadin PM     12/8/22   none     none   none       12/9/22   150 mg   150 mg   none       12/10/22     150 mg   150 mg   none     12/11/22     150 mg   150 mg   none     12/12/22     150 mg   none   none     12/13/22 (Procedure)     none   none   none     12/14/22     none   150 mg   10 mg (2 tablets)     12/15/22     150 mg   150 mg   10 mg (2 tabs)     12/16/22     150 mg   150 mg   5 mg (1 tab)     12/17/22     150 mg   150 mg   5 mg (1 tab)     12/18/22     150 mg   150 mg   5 mg (1 tab)     12/19/22     TBD   TBD   TBD             INR to be checked:  12/19/22  Radha Mcgarry [facility]/11/29/22    Clinical Pharmacist/Date    Any questions please call [facility] at [phone]

## 2022-11-30 ENCOUNTER — APPOINTMENT (OUTPATIENT)
Dept: PHARMACY | Age: 52
End: 2022-11-30
Payer: MEDICARE

## 2022-11-30 ENCOUNTER — TELEPHONE (OUTPATIENT)
Dept: PHARMACY | Age: 52
End: 2022-11-30

## 2022-12-01 ENCOUNTER — TELEPHONE (OUTPATIENT)
Dept: PHYSICAL MEDICINE AND REHAB | Age: 52
End: 2022-12-01

## 2022-12-01 DIAGNOSIS — K04.7 DENTAL ABSCESS: ICD-10-CM

## 2022-12-01 RX ORDER — OXYCODONE HYDROCHLORIDE AND ACETAMINOPHEN 5; 325 MG/1; MG/1
1 TABLET ORAL EVERY 6 HOURS PRN
Qty: 12 TABLET | Refills: 0 | Status: CANCELLED | OUTPATIENT
Start: 2022-12-01 | End: 2022-12-04

## 2022-12-01 NOTE — TELEPHONE ENCOUNTER
Helon Burton called requesting a refill on the following medications:  Requested Prescriptions     Pending Prescriptions Disp Refills    oxyCODONE-acetaminophen (PERCOCET) 5-325 MG per tablet 12 tablet 0     Sig: Take 1 tablet by mouth every 6 hours as needed for Pain for up to 3 days. Intended supply: 3 days. Take lowest dose possible to manage pain     [facility]   . pv      Date of last visit:   Date of next visit (if applicable): 33/44/6571

## 2022-12-01 NOTE — TELEPHONE ENCOUNTER
OARRS reviewed. UDS: + for  Aripiprazole, Citalopram/Escitalopram. Non-Compliant: Hydocodone and Pregabalin. Last seen: 9/26/2022.  Follow-up:   Future Appointments   Date Time Provider Anamika Dominguezi   12/8/2022 11:00 AM [facility] [place]   12/19/2022  2:15 PM Amrit Sanches MD N [facility] - [place]   12/27/2022 10:00 AM SANDRA Gordon - CNP N [facility] - [place]   1/3/2023  9:00 AM Chaka Cruz MD N [facility]. [facility] [place]   1/6/2023 10:20 AM SANDRA Polk CNP N Oncology P - [place]

## 2022-12-07 NOTE — TELEPHONE ENCOUNTER
Renotif. Pt. That he will not get narcotics from us as he has gotten them from Dr. Anne Marie Rivera. Pt. Is to cont. With planned injection and f/u. PT. Verbalized understanding. Also notif. Dr. Anne Marie Rivera office of this.

## 2022-12-07 NOTE — TELEPHONE ENCOUNTER
Attempted times 2 to call pt. And unable to leave message as voice box not setup. Pt. Not on Percocet by Johnson Hylton NP but [place] 5/325 with last 2 fills on 10/7 for 30 days and then by Carlie BARROSO for 7 days on 11/6 per OARRS. Also on OARRS noting 2 refills of Percocet 5/325 1 bid on 11/8 for 30 days and on 12/6 for 30 days both by Dr. Annie Williamson. Unable to clarify why received these meds. Called pharmacy and they confirmed he did get refills of Percocet. Called office of Dr. Justa Gupta who is a neurologist and per the nurse Charly Moscoso said he was no longer following with us and they had reminded him he is not allowed to have more than 1 dr. provide narcotics. He still denied following us and then they gave scripts.

## 2022-12-08 ENCOUNTER — HOSPITAL ENCOUNTER (OUTPATIENT)
Dept: PHARMACY | Age: 52
Setting detail: THERAPIES SERIES
Discharge: HOME | End: 2022-12-08
Payer: MEDICARE

## 2022-12-08 ENCOUNTER — PREP FOR PROCEDURE (OUTPATIENT)
Dept: PHYSICAL MEDICINE AND REHAB | Age: 52
End: 2022-12-08

## 2022-12-08 DIAGNOSIS — Z51.81 ENCOUNTER FOR THERAPEUTIC DRUG MONITORING: ICD-10-CM

## 2022-12-08 DIAGNOSIS — I26.99 PULMONARY EMBOLISM, UNSPECIFIED CHRONICITY, UNSPECIFIED PULMONARY EMBOLISM TYPE, UNSPECIFIED WHETHER ACUTE COR PULMONALE PRESENT (HCC): ICD-10-CM

## 2022-12-08 DIAGNOSIS — I48.91 ATRIAL FIBRILLATION, UNSPECIFIED TYPE (HCC): Primary | ICD-10-CM

## 2022-12-08 DIAGNOSIS — Z79.01 ANTICOAGULATED ON COUMADIN: ICD-10-CM

## 2022-12-08 LAB — POC INR: 1.9 (ref 0.8–1.2)

## 2022-12-08 PROCEDURE — 99213 OFFICE O/P EST LOW 20 MIN: CPT | Performed by: PHARMACIST

## 2022-12-08 PROCEDURE — 36416 COLLJ CAPILLARY BLOOD SPEC: CPT | Performed by: PHARMACIST

## 2022-12-08 PROCEDURE — 85610 PROTHROMBIN TIME: CPT | Performed by: PHARMACIST

## 2022-12-08 RX ORDER — OXYCODONE HYDROCHLORIDE AND ACETAMINOPHEN 5; 325 MG/1; MG/1
1 TABLET ORAL 2 TIMES DAILY PRN
COMMUNITY

## 2022-12-08 RX ORDER — ENOXAPARIN SODIUM 150 MG/ML
150 INJECTION SUBCUTANEOUS EVERY 12 HOURS
Qty: 16 ML | Refills: 0 | Status: SHIPPED | OUTPATIENT
Start: 2022-12-08

## 2022-12-08 NOTE — PROGRESS NOTES
Medication Management [address]  [phone] (phone)  [phone] (fax)    Mr. Juan C Gomez is a 45 y.o.  male with history of PE, Afib who presents today for anticoagulation monitoring and adjustment. Patient verifies current dosing regimen and tablet strength. No missed or extra doses. Patient denies s/s bleeding/bruising//SOB/chest pain  - Swelling in left leg, at baseline  No blood in urine or stool. No dietary changes. - Eating less green leafy vegetables   No changes in OTC agents/Herbals.  - Added oxycodone-APAP 5-325 mg twice daily as needed to med list.  No change in alcohol use or tobacco use. No change in activity level. - States he has been more active  Patient denies headaches/dizziness/lightheadedness/falls. - Patient states he feels \"groggy. \" Patient is falling asleep during appointment, had to continually remind patient to stay awake. No vomiting/diarrhea or acute illness. Lumbar injections on 12/13/22 and requires a 5 day hold of Coumadin with Lovenox bridge. Assessment:   Lab Results   Component Value Date    INR 1.90 (H) 12/08/2022    INR 3.10 (H) 11/23/2022    INR 3.80 (H) 11/17/2022     INR subtherapeutic   Recent Labs     12/08/22  1140   INR 1.90*        INR Goal is 2.0-3.0    Patient interview completed and discussed with pharmacist by Juliet Long PharmD Candidate      Plan:  Hold Coumadin 12/8-12/13. Follow Lovenox instructions as listed below. Coumadin 10mg on 12/14 and 12/15, then 5mg daily x 3 doses. Recheck INR in 5 day(s) after procedure. Patient reminded to call the Anticoagulation Clinic with any signs or symptoms of bleeding or with any medication changes. Patient given instructions utilizing the teach back method.       Medication Management [facility] Instruction Sheet  Patient:   Juan C Gomez YOB: 1970     Procedure:  Lumbar injection                                                  Date of Procedure:  12/13/22     Day Lovenox AM Lovenox PM Coumadin PM      12/8/22    none       none    none         12/9/22    150 mg    150 mg    none         12/10/22       150 mg    150 mg    none      12/11/22       150 mg    150 mg    none      12/12/22       150 mg    none    none      12/13/22 (Procedure)       none    none    none      12/14/22       none    150 mg    10 mg (2 tablets)      12/15/22       150 mg    150 mg    10 mg (2 tabs)      12/16/22       150 mg    150 mg    5 mg (1 tab)      12/17/22       150 mg    150 mg    5 mg (1 tab)      12/18/22       150 mg    150 mg    5 mg (1 tab)                                                                                                      INR to be checked:  12/19/22  Taj Ackerman [facility]/11/29/22     Clinical Pharmacist/Date    After visit summary printed and reviewed with patient. Discharged ambulatory in no apparent distress.     For Pharmacy Admin Tracking Only    Intervention Detail: Dose Adjustment: 1, reason: Therapy Optimization and New Rx: 1, reason: Needs Additional Therapy  Total # of Interventions Recommended: 2  Total # of Interventions Accepted: 2  Time Spent (min): 30

## 2022-12-12 ENCOUNTER — TELEPHONE (OUTPATIENT)
Dept: PHYSICAL MEDICINE AND REHAB | Age: 52
End: 2022-12-12

## 2022-12-12 NOTE — TELEPHONE ENCOUNTER
Pt. Called the office. Does not have transportation for tomorrows procedure. Procedure and follow up rescheduled. Updated med. clearance faxed to Dr. Victoria Walker.

## 2022-12-22 ENCOUNTER — HOSPITAL ENCOUNTER (OUTPATIENT)
Dept: PHARMACY | Age: 52
Setting detail: THERAPIES SERIES
Discharge: HOME | End: 2022-12-22
Payer: MEDICARE

## 2022-12-22 DIAGNOSIS — I26.99 PULMONARY EMBOLISM, UNSPECIFIED CHRONICITY, UNSPECIFIED PULMONARY EMBOLISM TYPE, UNSPECIFIED WHETHER ACUTE COR PULMONALE PRESENT (HCC): ICD-10-CM

## 2022-12-22 DIAGNOSIS — I48.91 ATRIAL FIBRILLATION, UNSPECIFIED TYPE (HCC): Primary | ICD-10-CM

## 2022-12-22 DIAGNOSIS — Z79.01 ANTICOAGULATED ON COUMADIN: ICD-10-CM

## 2022-12-22 DIAGNOSIS — Z51.81 ENCOUNTER FOR THERAPEUTIC DRUG MONITORING: ICD-10-CM

## 2022-12-22 LAB — POC INR: 1.8 (ref 0.8–1.2)

## 2022-12-22 PROCEDURE — 36416 COLLJ CAPILLARY BLOOD SPEC: CPT

## 2022-12-22 PROCEDURE — 85610 PROTHROMBIN TIME: CPT

## 2022-12-22 PROCEDURE — 99211 OFF/OP EST MAY X REQ PHY/QHP: CPT

## 2022-12-22 NOTE — PROGRESS NOTES
Medication Management [address]   [phone] (phone)  [phone] (fax)    Mr. Aram Perrin is a 45 y.o.  male with history of PE, Afib who presents today for anticoagulation monitoring and adjustment. Patient was to have lumbar injection 12/13/22 that he states he ended up needing to cancel. He states he had held his warfarin for 4 days before cancelled, then resumed at his normal warfarin dose. States he did 2 days of Lovenox, then stopped. Informed patient to call this clinic in the future with change of plans and we can dose the change in warfarin and Lovenox. Patient denies s/s bleeding/bruising/swelling/SOB/chest pain  No blood in urine or stool. No dietary changes. No changes in medication/OTC agents/Herbals. No change in alcohol use or tobacco use. No change in activity level. Patient denies headaches/dizziness/lightheadedness/falls. No vomiting/diarrhea or acute illness. 1/30/22 lumbar injection rescheduled. 5 day hold, bridge with Lovenox. Assessment:   Lab Results   Component Value Date    INR 1.80 (H) 12/22/2022    INR 1.90 (H) 12/08/2022    INR 3.10 (H) 11/23/2022     INR subtherapeutic   Recent Labs     12/22/22  1453   INR 1.80*     Plan:  Take 7.5 mg today then continue Coumadin 2.5 mg Wed and 5 mg [id number]. Recheck INR in 2 week(s). Patient reminded to call the Anticoagulation Clinic with any signs or symptoms of bleeding or with any medication changes. Patient given instructions utilizing the teach back method. After visit summary printed and reviewed with patient. Discharged in transport chair in no apparent distress.     For Pharmacy Admin Tracking Only    Intervention Detail: Dose Adjustment: 1, reason: Therapy Optimization  Total # of Interventions Recommended: 1  Total # of Interventions Accepted: 1  Time Spent (min): 30

## 2022-12-22 NOTE — PROGRESS NOTES
Medication Management [address] [place] [address] Instruction Sheet  Patient:   Jez Guan    YOB: 1970    Procedure:  Lumbar Injection      Date of Procedure:  1/30/23    Day Lovenox AM Lovenox PM Coumadin PM     1/25/23   none   none   none       1/26/23   none   none   none       1/27/23     150 mg   150 mg   none     1/28/23     150 mg   150 mg   none     1/29/23     150 mg   none   none     1/30/23     none   none   none     1/31/23     none   150 mg   5 mg     2/1/23     150 mg   150 mg   5 mg     2/2/23     150 mg   150 mg   5 mg     2/3/23     150 mg   150 mg   5 mg     2/4/23     150 mg   150 mg   5 mg     2/5/23     150 mg   150 mg   5 mg             INR to be checked:  2/6/23  Troy Martinez RP/12/22/22    Clinical Pharmacist/Date    Any questions please call [facility]tico[facility] at [phone]

## 2022-12-27 RX ORDER — AMLODIPINE BESYLATE 10 MG/1
10 TABLET ORAL DAILY
Qty: 28 TABLET | Refills: 0 | Status: SHIPPED | OUTPATIENT
Start: 2022-12-27

## 2022-12-27 RX ORDER — FUROSEMIDE 20 MG/1
20 TABLET ORAL DAILY
Qty: 28 TABLET | Refills: 0 | Status: SHIPPED | OUTPATIENT
Start: 2022-12-27

## 2022-12-28 RX ORDER — WARFARIN SODIUM 5 MG/1
TABLET ORAL
Qty: 60 TABLET | Refills: 1 | OUTPATIENT
Start: 2022-12-28

## 2022-12-28 RX ORDER — WARFARIN SODIUM 5 MG/1
TABLET ORAL
Qty: 35 TABLET | Refills: 1 | Status: SHIPPED | OUTPATIENT
Start: 2022-12-28 | End: 2023-01-24 | Stop reason: SDUPTHER

## 2023-01-11 ENCOUNTER — HOSPITAL ENCOUNTER (OUTPATIENT)
Dept: PHARMACY | Age: 53
Setting detail: THERAPIES SERIES
Discharge: HOME | End: 2023-01-11
Payer: MEDICARE

## 2023-01-11 ENCOUNTER — TELEPHONE (OUTPATIENT)
Dept: ONCOLOGY | Age: 53
End: 2023-01-11

## 2023-01-11 DIAGNOSIS — I26.99 PULMONARY EMBOLISM, OTHER, UNSPECIFIED CHRONICITY, UNSPECIFIED WHETHER ACUTE COR PULMONALE PRESENT (HCC): ICD-10-CM

## 2023-01-11 DIAGNOSIS — Z51.81 ENCOUNTER FOR THERAPEUTIC DRUG MONITORING: ICD-10-CM

## 2023-01-11 DIAGNOSIS — Z79.01 ANTICOAGULATED ON COUMADIN: ICD-10-CM

## 2023-01-11 DIAGNOSIS — I48.91 ATRIAL FIBRILLATION, UNSPECIFIED TYPE (HCC): Primary | ICD-10-CM

## 2023-01-11 LAB — POC INR: 3 (ref 0.8–1.2)

## 2023-01-11 PROCEDURE — 36416 COLLJ CAPILLARY BLOOD SPEC: CPT

## 2023-01-11 PROCEDURE — 85610 PROTHROMBIN TIME: CPT

## 2023-01-11 PROCEDURE — 99211 OFF/OP EST MAY X REQ PHY/QHP: CPT

## 2023-01-11 NOTE — PROGRESS NOTES
Medication Management [address]   [phone] (phone)  [phone] (fax)    Mr. Aram Perrin is a 45 y.o.  male with history of PE, Afib who presents today for anticoagulation monitoring and adjustment. Patient verifies tablet strength. No missed doses. - Reports that he has been taking 7.5 mg on , 5 mg all other days. He was suppose to be taking 2.5 mg on , 5 mg all other days  Patient denies s/s bleeding/bruising/swelling/SOB/chest pain  No blood in urine or stool. No dietary changes. No changes in medication/OTC agents/Herbals. No change in alcohol use or tobacco use. No change in activity level. Patient denies headaches/dizziness/lightheadedness/falls. No vomiting/diarrhea or acute illness. Procedures scheduled in the future at this time. - Lumbar injection on 23 requiring a 5 day hold of Coumadin and Lovenox bridge. Assessment:   Lab Results   Component Value Date    INR 3.00 (H) 2023    INR 1.80 (H) 2022    INR 1.90 (H) 2022     INR therapeutic   Recent Labs     23  1422   INR 3.00*     Plan:  Adjust Coumadin to 5 mg once daily. Recheck INR in 2 week(s). Patient reminded to call the Anticoagulation Clinic with any signs or symptoms of bleeding or with any medication changes. Patient given instructions utilizing the teach back method. After visit summary printed and reviewed with patient. Discharged ambulatory in no apparent distress.     For Pharmacy Admin Tracking Only    Intervention Detail: Adherence Monitorin and Dose Adjustment: 1, reason: Therapy Optimization  Total # of Interventions Recommended: 2  Total # of Interventions Accepted: 2  Time Spent (min): [address], PharmD, BCPS  2023  4:18 PM

## 2023-01-11 NOTE — TELEPHONE ENCOUNTER
Received call from patient issa needing help with transportation to and from appointment tomorrow. [address] and they have ride arranged to pick him up at 12:30 and then to bring him home. I called patient back and updated him- he verbalized understanding.

## 2023-01-17 ENCOUNTER — OFFICE VISIT (OUTPATIENT)
Dept: CARDIOLOGY CLINIC | Age: 53
End: 2023-01-17
Payer: MEDICARE

## 2023-01-17 VITALS
HEIGHT: 68 IN | WEIGHT: 304.4 LBS | BODY MASS INDEX: 46.13 KG/M2 | DIASTOLIC BLOOD PRESSURE: 91 MMHG | SYSTOLIC BLOOD PRESSURE: 142 MMHG | HEART RATE: 74 BPM

## 2023-01-17 DIAGNOSIS — Z86.79 HISTORY OF ATRIAL FIBRILLATION: ICD-10-CM

## 2023-01-17 DIAGNOSIS — R60.0 BILATERAL LEG EDEMA: ICD-10-CM

## 2023-01-17 DIAGNOSIS — Z01.818 PRE-OP EVALUATION: Primary | ICD-10-CM

## 2023-01-17 DIAGNOSIS — I50.32 CHRONIC HEART FAILURE WITH PRESERVED EJECTION FRACTION (HCC): Chronic | ICD-10-CM

## 2023-01-17 DIAGNOSIS — I35.0 AORTIC STENOSIS, MILD: Chronic | ICD-10-CM

## 2023-01-17 DIAGNOSIS — I10 HYPERTENSION, ESSENTIAL: Chronic | ICD-10-CM

## 2023-01-17 PROCEDURE — 93000 ELECTROCARDIOGRAM COMPLETE: CPT | Performed by: INTERNAL MEDICINE

## 2023-01-17 PROCEDURE — 3080F DIAST BP >= 90 MM HG: CPT | Performed by: INTERNAL MEDICINE

## 2023-01-17 PROCEDURE — G8484 FLU IMMUNIZE NO ADMIN: HCPCS | Performed by: INTERNAL MEDICINE

## 2023-01-17 PROCEDURE — 1036F TOBACCO NON-USER: CPT | Performed by: INTERNAL MEDICINE

## 2023-01-17 PROCEDURE — G8427 DOCREV CUR MEDS BY ELIG CLIN: HCPCS | Performed by: INTERNAL MEDICINE

## 2023-01-17 PROCEDURE — 3017F COLORECTAL CA SCREEN DOC REV: CPT | Performed by: INTERNAL MEDICINE

## 2023-01-17 PROCEDURE — 99214 OFFICE O/P EST MOD 30 MIN: CPT | Performed by: INTERNAL MEDICINE

## 2023-01-17 PROCEDURE — G8417 CALC BMI ABV UP PARAM F/U: HCPCS | Performed by: INTERNAL MEDICINE

## 2023-01-17 PROCEDURE — 3077F SYST BP >= 140 MM HG: CPT | Performed by: INTERNAL MEDICINE

## 2023-01-17 RX ORDER — POTASSIUM CHLORIDE 750 MG/1
10 TABLET, EXTENDED RELEASE ORAL 2 TIMES DAILY
Qty: 60 TABLET | Refills: 3 | Status: SHIPPED | OUTPATIENT
Start: 2023-01-17

## 2023-01-17 RX ORDER — FUROSEMIDE 20 MG/1
20 TABLET ORAL 2 TIMES DAILY
Qty: 180 TABLET | Refills: 1 | Status: SHIPPED | OUTPATIENT
Start: 2023-01-17 | End: 2023-01-20

## 2023-01-17 NOTE — PROGRESS NOTES
Chief Complaint   Patient presents with    Follow-up    Cardiac Clearance   Originally patient establish cardiologist from [facility]          Hx of recent admission for PE and Left leg DVT and ssent home on coumadin and off apixaban   Was I=on apixaban for Hx of atr fib ( no ekg obtained to verify)  Was  teated in [facility]      4 month F/u    Patient presents here for cardiac clearance.  He is having an epidural injection on 1/30/2023 with Dr. Arjun Fam    EKG done today    Leg edema +1 -chronic - not worse  Sob on exertion chronic    Denied  Chest pain,  dizziness or palpitations  Non wt gain    nevere smoked    FHX  Mother had MI at her 52's      Past- 2012 had cp and abn nuc then and did not want cath that time    Patient Active Problem List   Diagnosis    History of Parox atrial fibrillation    Atrial fibrillation (HCC)    BPH (benign prostatic hyperplasia)    Carpal tunnel syndrome on right    Chronic gout    DM2 (diabetes mellitus, type 2) ([address]    Heart failure with preserved ejection fraction ([address]    History of alcohol abuse    History of osteomyelitis    Hypogonadism, male    Major depression    Morbid obesity ([address]    DURAN (nonalcoholic steatohepatitis)    KIARA treated with BiPAP    Vitamin D deficiency    Physical deconditioning    Mood disorder (HCC)    GERD (gastroesophageal reflux disease)    Primary osteoarthritis of left hip    Hypertension, essential    Dyslipidemia    Aortic stenosis, mild to moderate    Perirectal abscess    Elevated alkaline phosphatase level    Hx of Chest pain, atypical    Lumbar spondylosis    Lumbar facet arthropathy    Lumbar radiculitis    Spinal stenosis of lumbar region with neurogenic claudication    Ankylosing spondylitis (HCC)    Mid back pain    Pulmonary embolism (HCC)    Anticoagulated on Coumadin    Encounter for therapeutic drug monitoring    Pre-op evaluation for epidural; injection    Bilateral leg edema +1 to +2 with wound on the left       Past Surgical History:   Procedure Laterality Date    COLONOSCOPY N/A 11/15/2020    COLONOSCOPY DIAGNOSTIC performed by Seb Martinez MD at [facility]    ENDOSCOPY, COLON, DIAGNOSTIC      FOOT SURGERY Right     2018, R foot osteomyelitis    HIP SURGERY Left 6/29/2020    bilateral hip steroid injection, Left HIP FIRST performed by Noel Esparza MD at [facility] Bilateral 4/11/2022    bilateral L-facet MBB # 1 @ L4-5 and L5-S1 performed by Noel Esparza MD at [facility] N/A 5/23/2022    LESI  @ L5. performed by Noel Esparza MD at [address] [place] [address] N/A 10/26/2020    SIGMOIDOSCOPY DIAGNOSTIC FLEXIBLE performed by Luisana Monteiro MD at [address] [place] [address]      as a baby    UPPER GASTROINTESTINAL ENDOSCOPY N/A 11/14/2020    EGD DIAGNOSTIC ONLY performed by Seb Martinez MD at [place] [address] N/A 12/27/2021    [facility]       Allergies   Allergen Reactions    Penicillins      Tolerated Zosyn 9/24/2020        Family History   Problem Relation Age of Onset    Heart Disease Mother         MI    Cancer Paternal Aunt         lung        Social History     Socioeconomic History    Marital status:      Spouse name: Not on file    Number of children: Not on file    Years of education: Not on file    Highest education level: Not on file   Occupational History    Not on file   Tobacco Use    Smoking status: Never    Smokeless tobacco: Never   Vaping Use    Vaping Use: Never used   Substance and Sexual Activity    Alcohol use: Not Currently     Comment: hx of abuse    Drug use: No    Sexual activity: Not Currently   Other Topics Concern    Not on file   Social History Narrative    Not on file     Social Determinants of Health     Financial Resource Strain: Not on file   Food Insecurity: Not on file   Transportation Needs: Not on file   Physical Activity: Not on file   Stress: Not on file   Social Connections: Not on file   Intimate Partner Violence: Not on file   Housing Stability: Not on file       Current Outpatient Medications   Medication Sig Dispense Refill    potassium chloride (KLOR-CON M10) 10 MEQ extended release tablet Take 1 tablet by mouth 2 times daily 60 tablet 3    warfarin (COUMADIN) 5 MG tablet Take as directed by [facility] (#35 ~ 30 day supply) 35 tablet 1    furosemide (LASIX) 20 MG tablet TAKE 1 TABLET BY MOUTH DAILY (Patient taking differently: Take 20 mg by mouth 2 times daily) 28 tablet 0    amLODIPine (NORVASC) 10 MG tablet TAKE 1 TABLET BY MOUTH DAILY 28 tablet 0    oxyCODONE-acetaminophen (PERCOCET) 5-325 MG per tablet Take 1 tablet by mouth 2 times daily as needed for Pain.      hydrALAZINE (APRESOLINE) 50 MG tablet Take 1 tablet by mouth 3 times daily 90 tablet 3    escitalopram (LEXAPRO) 20 MG tablet Take 20 mg by mouth daily      vitamin E 400 UNIT capsule Take 400 Units by mouth daily      Vitamin A 3 MG (19662 UT) TABS Take 3 mg by mouth daily      allopurinol (ZYLOPRIM) 300 MG tablet Take 1 tablet by mouth daily 90 tablet 0    vitamin C (ASCORBIC ACID) 500 MG tablet Take 2 tablets by mouth daily 30 tablet 0    CHOLECALCIFEROL PO Take 2,000 Units by mouth daily      atorvastatin (LIPITOR) 40 MG tablet Take 40 mg by mouth nightly      acetaminophen (TYLENOL) 325 MG tablet Take 650 mg by mouth every 4 hours as needed for Pain      busPIRone (BUSPAR) 10 MG tablet Take 10 mg by mouth 2 times daily       Multiple Vitamins-Minerals (THERAPEUTIC MULTIVITAMIN-MINERALS) tablet Take 1 tablet by mouth daily      Probiotic Product (SOBIA-BID PROBIOTIC PO) Take 1 tablet by mouth daily       sitaGLIPtan-metFORMIN (JANUMET)  MG per tablet Take 1 tablet by mouth 2 times daily (with meals)       ARIPiprazole (ABILIFY PO) Take 15 mg by mouth daily       Blood Glucose Monitoring Suppl (SlatedE) ARTIE Patient needs all supplies for qd testing. DX: 250.00 1 Device 11    pregabalin (LYRICA) 75 MG capsule Take 1 capsule by mouth 3 times daily for 30 days. 90 capsule 0     No current facility-administered medications for this visit. Review of Systems -     General ROS: negative  Psychological ROS: negative  Hematological and Lymphatic ROS: No history of blood clots or bleeding disorder. Respiratory ROS: no cough,  or wheezing, the rest see HPI  Cardiovascular ROS: See HPI  Gastrointestinal ROS: negative  Genito-Urinary ROS: no dysuria, trouble voiding, or hematuria  Musculoskeletal ROS: negative  Neurological ROS: no TIA or stroke symptoms  Dermatological ROS: negative      Blood pressure (!) 142/91, pulse 74, height 5' 8\" (1.727 m), weight (!) 304 lb 6.4 oz (138.1 kg). Physical Examination:    General appearance - alert, well appearing, and in no distress  HEENT- Pink conjunctiva  , Non-icteri sclera,PERRLA  Mental status - alert, oriented to person, place, and time  Neck - supple, no significant adenopathy, no JVD, or carotid bruits  Chest - clear to auscultation, no wheezes, rales or rhonchi, symmetric air entry  Heart - normal rate, regular rhythm, normal S1, S2, no murmurs, rubs, clicks or gallops  Abdomen - soft, nontender, nondistended, no masses or organomegaly  JORDAN- no CVA or flank tenderness, no suprapubic tenderness  Neurological - alert, oriented, normal speech, no focal findings or movement disorder noted  Musculoskeletal/limbs - no joint tenderness, deformity or swelling   - peripheral pulses normal, no pedal edema, no clubbing or cyanosis  Skin - normal coloration and turgor, no rashes, no suspicious skin lesions noted  Psych- appropriate mood and affect    Lab  No results for input(s): CKTOTAL, CKMB, CKMBINDEX, TROPONINI in the last 72 hours.   CBC:   Lab Results   Component Value Date/Time    WBC 9.9 09/12/2022 04:42 PM    RBC 4.73 09/12/2022 04:42 PM    RBC 4.55 04/15/2012 11:53 PM    HGB 14.4 11/08/2022 10:24 AM    HCT 44.2 11/08/2022 10:24 AM    MCV 90.9 09/12/2022 04:42 PM    MCH 30.7 09/12/2022 04:42 PM    MCHC 33.7 09/12/2022 04:42 PM    RDW 19.9 04/06/2020 12:00 AM     09/12/2022 04:42 PM    MPV 11.0 09/12/2022 04:42 PM     BMP:    Lab Results   Component Value Date/Time     09/12/2022 04:42 PM    K 4.0 09/12/2022 04:42 PM    K 4.2 03/10/2022 08:35 PM     09/12/2022 04:42 PM    CO2 26 09/12/2022 04:42 PM    BUN 15 09/12/2022 04:42 PM    LABALBU 4.3 09/06/2022 02:15 PM    LABALBU 4.4 01/26/2012 10:59 AM    CREATININE 1.0 09/12/2022 04:42 PM    CALCIUM 9.7 09/12/2022 04:42 PM    GFRAA >60 01/23/2019 07:12 PM    LABGLOM >90 09/12/2022 04:42 PM    GLUCOSE 247 09/12/2022 04:42 PM    GLUCOSE 113 06/24/2018 12:11 PM     Hepatic Function Panel:    Lab Results   Component Value Date/Time    ALKPHOS 140 09/06/2022 02:15 PM    ALT 52 09/06/2022 02:15 PM    AST 44 09/06/2022 02:15 PM    PROT 7.4 09/06/2022 02:15 PM    BILITOT 0.5 09/06/2022 02:15 PM    BILIDIR <0.2 09/06/2022 02:15 PM    LABALBU 4.3 09/06/2022 02:15 PM    LABALBU 4.4 01/26/2012 10:59 AM     Magnesium:    Lab Results   Component Value Date/Time    MG 1.4 11/19/2020 03:58 AM     Warfarin PT/INR:  No components found for: PTPATWAR, PTINRWAR  HgBA1c:    Lab Results   Component Value Date/Time    LABA1C 7.3 09/06/2021 11:42 PM     FLP:  No results found for: TRIG, HDL, LDLCALC, LDLDIRECT, LABVLDL  TSH:  No results found for: TSH  Conclusions      Summary   Ejection fraction is visually estimated at 60%. Overall left ventricular function is normal.   The aortic valve leaflets were not well visualized. Aortic valve appears tricuspid. Aortic valve leaflets are somewhat thickened. Aortic valve leaflets are Mildly calcified. The maximum aortic valve gradient is 30 mmHg, the mean gradient is 15   mmHg, and the peak velocity is 275 cm/s.    There is mild-to-moderate aortic stenosis with valve area of 1.5 sq cm. Signature      ----------------------------------------------------------------   Electronically signed by Lino Rodriguez MD (Interpreting   physician) on 09/24/2020 at 04:36 PM   ----------------------------------------------------------------           Conclusions      Summary   Ejection fraction is visually estimated at 55%. Overall left ventricular function is normal.      Signature      ----------------------------------------------------------------   Electronically signed by Lino Rodriguez MD (Interpreting   physician) on 09/07/2021 at 03:43 PM   ----------------------------------------------------------------    Conclusions      Summary   This Nuclear Medicine study was negative for ischemia . difficult scan due to patient body habitus   normal EF      Recommendation   Medical management. Signatures      ----------------------------------------------------------------   Electronically signed by Lino Rodriguez MD (Interpreting   Cardiologist) on 09/07/2021 at 16:38   ----------------------------------------------------------------           EKG 9/30/19  Sinus  Rhythm   Low voltage in precordial leads.    -  Nonspecific T-abnormality. ABNORMAL   Normal sinus rhythm  Low voltage QRS, consider pulmonary disease, pericardial effusion, or normal variant  Nonspecific T wave abnormality  Abnormal ECG  When compared with ECG of 09-NOV-2011 22:01,  No significant change was found  Confirmed by Lesli Taylor MD, Norman Seo ([id number]) on 5/20/2020 8:02:47    topronin neg x2    ekg 3/10/22  Normal sinus rhythm Low voltage QRS, consider pulmonary disease, pericardial effusion, or normal variant Borderline ECG When compared with ECG of 10-MAR-2022 19:59, No significant change was found    Ekg 1/17/2023  Sinus  Rhythm   -Poor R-wave progression -may be secondary to pulmonary disease   consider old anterior infarct.    -  Nonspecific T-abnormality.     Low voltage with rightward P-axis and rotation -possible pulmonary disease. ABNORMAL       Assessment       Diagnosis Orders   1. Pre-op evaluation for epidural; injection  Basic Metabolic Panel    Magnesium    [facility]      2. Hypertension, essential  Basic Metabolic Panel    Magnesium    [facility]      3. Chronic heart failure with preserved ejection fraction [facility])  Basic Metabolic Panel    Magnesium    [facility]      4. Aortic stenosis, mild to moderate  Basic Metabolic Panel    Magnesium    [facility]      5. History of Parox atrial fibrillation  Basic Metabolic Panel    Magnesium    [facility]      6. Bilateral leg edema +1 to +2 with wound on the left  Basic Metabolic Panel    [facility]              Plan     The current meds and labs reviewed    Pre op for epidural  May proceed  [facility] for Hx of [place]VT and pe to be address by the treating team    Continue the current treatment and with constant vigilance to changes in symptoms and also any potential side effects. Return for care or seek medical attention immediately if symptoms got worse and/or develop new symptoms. Hypertension, on medical treatment. Seems to be under poor control. Patient is compliant with medical treatment. Now on losartan hctz 50/12.5 po qd  Cont  hydralazine 50 po tid    Echo and nuc stress- WNL sept 2021  Asa 81 po qd  Limited walking capability      Congestive heart failure: +ve evidence of fluid overload today, no recent hospitalization for CHF  Increase  lasix  20 bid from qd  Increae  kcl 20 meq po bid from qd  Pat did not bring his med list  BM and MG    Bilateral leg edema +1 to +2 with wound on the left  Refer to wound care    Advised to lose wt     Hyperlipidemia: on statins, followed periodically. Patient need periodic lipid and liver profile.     Hx of atr fib - no ekg found But documented on note from outside  Off  apixaban  Now on coumadin started after he had DVT /PE at [facility]  Coumadin prescribed by other clinician     Document reviewed from outside  Reported CHF and atr fib and meds   Lasix and apixaban for it  Need to get ekg with atr fib and did not find one    D/w the pat plan of care    from cardiac stand Stable and doing well    Discussed use, benefit, and side effects of prescribed medications. All patient questions answered. Pt voiced understanding. Instructed to continue current medications, diet and exercise. Continue risk factor modification and medical management. Patient agreed with treatment plan. Follow up as directed.       RTC in 1  months for chf and leg edema        Mary Ann Augustin, [facility]

## 2023-01-17 NOTE — PATIENT INSTRUCTIONS
TAKE LASIX 20 MG TWICE DAILY AND POTASSIUM 10 MEQ TWICE DAILY. LABS TO BE DONE PRIOR TO NEXT VISIT. You may receive a survey regarding the care you received during your visit. Your input is valuable to us. We encourage you to complete and return your survey. We hope you will choose us in the future for your healthcare needs.

## 2023-01-19 ENCOUNTER — HOSPITAL ENCOUNTER (OUTPATIENT)
Dept: INFUSION THERAPY | Age: 53
Discharge: HOME | End: 2023-01-19
Payer: MEDICARE

## 2023-01-19 ENCOUNTER — OFFICE VISIT (OUTPATIENT)
Dept: ONCOLOGY | Age: 53
End: 2023-01-19
Payer: MEDICARE

## 2023-01-19 VITALS
HEIGHT: 68 IN | TEMPERATURE: 98 F | WEIGHT: 302.2 LBS | HEART RATE: 87 BPM | RESPIRATION RATE: 20 BRPM | OXYGEN SATURATION: 91 % | DIASTOLIC BLOOD PRESSURE: 79 MMHG | BODY MASS INDEX: 45.8 KG/M2 | SYSTOLIC BLOOD PRESSURE: 147 MMHG

## 2023-01-19 VITALS
BODY MASS INDEX: 45.8 KG/M2 | DIASTOLIC BLOOD PRESSURE: 79 MMHG | RESPIRATION RATE: 20 BRPM | TEMPERATURE: 98 F | WEIGHT: 302.2 LBS | HEIGHT: 68 IN | OXYGEN SATURATION: 91 % | HEART RATE: 87 BPM | SYSTOLIC BLOOD PRESSURE: 147 MMHG

## 2023-01-19 DIAGNOSIS — I26.99 OTHER ACUTE PULMONARY EMBOLISM WITHOUT ACUTE COR PULMONALE (HCC): ICD-10-CM

## 2023-01-19 DIAGNOSIS — I82.462 ACUTE DEEP VEIN THROMBOSIS (DVT) OF CALF MUSCLE VEIN OF LEFT LOWER EXTREMITY (HCC): ICD-10-CM

## 2023-01-19 DIAGNOSIS — R53.82 CHRONIC FATIGUE: ICD-10-CM

## 2023-01-19 DIAGNOSIS — I82.462 ACUTE DEEP VEIN THROMBOSIS (DVT) OF CALF MUSCLE VEIN OF LEFT LOWER EXTREMITY (HCC): Primary | ICD-10-CM

## 2023-01-19 DIAGNOSIS — R53.1 WEAKNESS: ICD-10-CM

## 2023-01-19 LAB
ABSOLUTE IMMATURE GRANULOCYTE: 0.02 THOU/MM3 (ref 0–0.07)
ALBUMIN SERPL BCG-MCNC: 4.1 G/DL (ref 3.5–5.1)
ALP SERPL-CCNC: 151 U/L (ref 38–126)
ALT SERPL W/O P-5'-P-CCNC: 37 U/L (ref 11–66)
AST SERPL-CCNC: 39 U/L (ref 5–40)
BASINOPHIL, AUTOMATED: 1 % (ref 0–3)
BASOPHILS ABSOLUTE: 0 THOU/MM3 (ref 0–0.1)
BILIRUB CONJ SERPL-MCNC: < 0.2 MG/DL (ref 0–0.3)
BILIRUB SERPL-MCNC: 0.4 MG/DL (ref 0.3–1.2)
BUN, WHOLE BLOOD: 16 MG/DL (ref 8–26)
CHLORIDE, WHOLE BLOOD: 109 MEQ/L (ref 98–109)
CREATININE, WHOLE BLOOD: 1.1 MG/DL (ref 0.5–1.2)
EOSINOPHILS ABSOLUTE: 0.2 THOU/MM3 (ref 0–0.4)
EOSINOPHILS RELATIVE PERCENT: 3 % (ref 0–4)
GFR SERPL CREATININE-BSD FRML MDRD: > 60 ML/MIN/1.73M2
GLUCOSE, WHOLE BLOOD: 216 MG/DL (ref 70–108)
HCT VFR BLD CALC: 46.1 % (ref 42–52)
HEMOGLOBIN: 14.9 GM/DL (ref 14–18)
IMMATURE GRANULOCYTES: 0 %
IONIZED CALCIUM, WHOLE BLOOD: 1.09 MMOL/L (ref 1.12–1.32)
LYMPHOCYTES # BLD: 18 % (ref 15–47)
LYMPHOCYTES ABSOLUTE: 1.6 THOU/MM3 (ref 1–4.8)
MCH RBC QN AUTO: 28.9 PG (ref 26–33)
MCHC RBC AUTO-ENTMCNC: 32.3 GM/DL (ref 32.2–35.5)
MCV RBC AUTO: 90 FL (ref 80–94)
MONOCYTES ABSOLUTE: 0.5 THOU/MM3 (ref 0.4–1.3)
MONOCYTES: 6 % (ref 0–12)
PDW BLD-RTO: 15.5 % (ref 11.5–14.5)
PLATELET # BLD: 283 THOU/MM3 (ref 130–400)
PMV BLD AUTO: 10.7 FL (ref 9.4–12.4)
POTASSIUM, WHOLE BLOOD: 4.2 MEQ/L (ref 3.5–4.9)
PROT SERPL-MCNC: 7.3 G/DL (ref 6.1–8)
RBC # BLD: 5.15 MILL/MM3 (ref 4.7–6.1)
SEG NEUTROPHILS: 73 % (ref 43–75)
SEGMENTED NEUTROPHILS ABSOLUTE COUNT: 6.5 THOU/MM3 (ref 1.8–7.7)
SODIUM, WHOLE BLOOD: 142 MEQ/L (ref 138–146)
TOTAL CO2, WHOLE BLOOD: 22 MEQ/L (ref 23–33)
WBC # BLD: 8.9 THOU/MM3 (ref 4.8–10.8)

## 2023-01-19 PROCEDURE — 3077F SYST BP >= 140 MM HG: CPT | Performed by: NURSE PRACTITIONER

## 2023-01-19 PROCEDURE — 80047 BASIC METABLC PNL IONIZED CA: CPT

## 2023-01-19 PROCEDURE — G8484 FLU IMMUNIZE NO ADMIN: HCPCS | Performed by: NURSE PRACTITIONER

## 2023-01-19 PROCEDURE — 99211 OFF/OP EST MAY X REQ PHY/QHP: CPT

## 2023-01-19 PROCEDURE — G8417 CALC BMI ABV UP PARAM F/U: HCPCS | Performed by: NURSE PRACTITIONER

## 2023-01-19 PROCEDURE — 85025 COMPLETE CBC W/AUTO DIFF WBC: CPT

## 2023-01-19 PROCEDURE — 80076 HEPATIC FUNCTION PANEL: CPT

## 2023-01-19 PROCEDURE — 3078F DIAST BP <80 MM HG: CPT | Performed by: NURSE PRACTITIONER

## 2023-01-19 PROCEDURE — G8427 DOCREV CUR MEDS BY ELIG CLIN: HCPCS | Performed by: NURSE PRACTITIONER

## 2023-01-19 PROCEDURE — 36415 COLL VENOUS BLD VENIPUNCTURE: CPT

## 2023-01-19 PROCEDURE — 99214 OFFICE O/P EST MOD 30 MIN: CPT | Performed by: NURSE PRACTITIONER

## 2023-01-19 PROCEDURE — 3017F COLORECTAL CA SCREEN DOC REV: CPT | Performed by: NURSE PRACTITIONER

## 2023-01-19 PROCEDURE — 1036F TOBACCO NON-USER: CPT | Performed by: NURSE PRACTITIONER

## 2023-01-19 NOTE — PROGRESS NOTES
[address] CANCER [address] [place] [address]  Dept: [phone]  Loc: [phone]       Visit Date:1/19/2023     Leonie Montiel is a 45 y.o. male who presents today for:   Chief Complaint   Patient presents with    Follow-up      Acute deep vein thrombosis (DVT) of calf muscle vein of left lower extremity (HCC)        HPI:   Leonie Montiel is a 45 y.o. male with DVT with Pulmonary embolism. The patient also has a history of Afib and long-term anticoagulation therapy with Eliquis, BPH, gout, DM, CHF, DURAN, KIARA with BiPAP, GERD, HTN, hyperlipidemia, spinal stenosis and ankylosing spondylitis with pain injections. Patient reports a prolonged hospitalization 08/2020 due to Covid 19. He received treatment with Decadron, Remdesivir and Azithromycin. He was discharged to [facility]. He was again admitted for sepsis following Covid pneumonia. He has a complicated health history. He developed a DVT of the LLE with PE. Patient also follows with pulmonology, Dr. Domingo Amaya and follows with Neurology, Dr. Lay Martinez who prescribes his Percocet 5-325 mg.    9/15/2022: coagulopathy work-up results reveal B2 glycoprotein IgG and IgM AB are less than 10. Cardiolipin antibody IgG less than 10, negative findings. Patient is negative for lupus anticoagulant. Protein C and protein S and Antithrombin activity are within normal limits. He is negative prothrombin gene mutation or factor V Leiden mutation. 10/6/2022:  He complains of SOB with minimal exertion. He has BLE edema L > R.      01/17/2023 the patient was seen for follow-up with Dr. Bozena Elias of cardiology for clearance to undergo lumbar epidural steroid injection (LESI) of L4-L5 on 01/30/2023 and Lovenox bridging. Interval History 1/19/2023: Patient here for follow-up on his DVT with PE and anticoagulation. He is currently on treatment with Coumadin 10 mg by mouth daily.   He follows with Coumadin clinic. He denies abnormal bleeding; no epistaxis, gingival bleeding, hemoptysis, hematemesis, hematuria, hematochezia, melena. He complains of chronic fatigue and weakness. He uses a wheelchair and a cane to assist with mobility. No recent falls or trauma. He complains of left lower leg pain and swelling. He denies any fevers or night sweats. No chest pain or heart palpitations. He complains of BENJAMIN. No cough or hemoptysis. PMH, SH, and FH:  I reviewed the patients medication list and allergy list as noted on the electronic medical record. The PMH, SH and FH were also reviewed as noted on the EMR. Review of Systems:   Review of Systems   Pertinent review of systems noted in HPI, all other ROS negative. Objective:   Physical Exam   BP (!) 147/79 (Site: Right Lower Arm, Position: Sitting, Cuff Size: Medium Adult)   Pulse 87   Temp 98 °F (36.7 °C) (Oral)   Resp 20   Ht 5' 8\" (1.727 m)   Wt (!) 302 lb 3.2 oz (137.1 kg)   SpO2 91%   BMI 45.95 kg/m²    General appearance: No apparent distress, calm and cooperative. HEENT: Pupils equal, round, and reactive to light. Conjunctivae/corneas clear. Oral mucosa intact  Neck: Supple, with full range of motion. Trachea midline. Respiratory:  Normal respiratory effort. Clear to auscultation, bilaterally without Rales/Wheezes/Rhonchi. Cardiovascular: Regular rate and rhythm with normal S1/S2 without murmurs, rubs or gallops. Abdomen: Soft, non-tender, non-distended with active bowel sounds. Musculoskeletal: No clubbing, cyanosis or edema bilaterally. Skin: Skin color, texture, turgor normal.  No visible rashes or lesions. Neurologic:  Neurovascularly intact without any focal sensory/motor deficits.    Psychiatric: Alert and oriented, thought content appropriate, normal insight  Capillary Refill: Brisk,< 3 seconds   Peripheral Pulses: +2 palpable, equal bilaterally       Imaging Studies and Labs:   CBC:   Lab Results Component Value Date    WBC 8.9 01/19/2023    HGB 14.9 01/19/2023    HCT 46.1 01/19/2023    MCV 90 01/19/2023     01/19/2023     BMP:   Lab Results   Component Value Date/Time     01/19/2023 01:33 PM     09/12/2022 04:42 PM    K 4.2 01/19/2023 01:33 PM    K 4.0 09/12/2022 04:42 PM    K 4.2 03/10/2022 08:35 PM     09/12/2022 04:42 PM    CO2 26 09/12/2022 04:42 PM    BUN 15 09/12/2022 04:42 PM    CREATININE 1.1 01/19/2023 01:33 PM    CREATININE 1.0 09/12/2022 04:42 PM    GLUCOSE 247 09/12/2022 04:42 PM    GLUCOSE 113 06/24/2018 12:11 PM    CALCIUM 9.7 09/12/2022 04:42 PM      LFT:   Lab Results   Component Value Date    ALT 52 09/06/2022    AST 44 (H) 09/06/2022    ALKPHOS 140 (H) 09/06/2022    BILITOT 0.5 09/06/2022         Assessment and Plan:   1. Acute deep vein thrombosis (DVT) of calf muscle vein of left lower extremity (HCC)  Continue Coumadin, Lovenox for procedures  - CBC with Auto Differential; Future  - Hepatic Function Panel; Future  - POC PANEL BMP W/IOCA; Future  - D-Dimer, Quantitative; Future    2. Other acute pulmonary embolism without acute cor pulmonale ([facility]    3. Chronic fatigue    4. Weakness    Return in about 3 months (around 4/19/2023). All patient questions answered. Pt voiced understanding. Patient agreed with treatment plan. Follow up as directed. Patient instructed to call for questions or concerns.        Electronically signed by   SANDRA Garcia CNP

## 2023-01-20 RX ORDER — FUROSEMIDE 20 MG/1
TABLET ORAL
Qty: 30 TABLET | Refills: 0 | Status: SHIPPED | OUTPATIENT
Start: 2023-01-20

## 2023-01-20 RX ORDER — AMLODIPINE BESYLATE 10 MG/1
10 TABLET ORAL DAILY
Qty: 30 TABLET | Refills: 0 | Status: SHIPPED | OUTPATIENT
Start: 2023-01-20

## 2023-01-23 ENCOUNTER — APPOINTMENT (OUTPATIENT)
Dept: PHARMACY | Age: 53
End: 2023-01-23
Payer: MEDICARE

## 2023-01-24 ENCOUNTER — HOSPITAL ENCOUNTER (OUTPATIENT)
Dept: PHARMACY | Age: 53
Setting detail: THERAPIES SERIES
Discharge: HOME | End: 2023-01-24
Payer: MEDICARE

## 2023-01-24 DIAGNOSIS — I48.91 ATRIAL FIBRILLATION, UNSPECIFIED TYPE (HCC): Primary | ICD-10-CM

## 2023-01-24 DIAGNOSIS — Z79.01 ANTICOAGULATED ON COUMADIN: ICD-10-CM

## 2023-01-24 DIAGNOSIS — Z51.81 ENCOUNTER FOR THERAPEUTIC DRUG MONITORING: ICD-10-CM

## 2023-01-24 DIAGNOSIS — I26.99 PULMONARY EMBOLISM, UNSPECIFIED CHRONICITY, UNSPECIFIED PULMONARY EMBOLISM TYPE, UNSPECIFIED WHETHER ACUTE COR PULMONALE PRESENT (HCC): ICD-10-CM

## 2023-01-24 LAB — POC INR: 2.1 (ref 0.8–1.2)

## 2023-01-24 PROCEDURE — 85610 PROTHROMBIN TIME: CPT

## 2023-01-24 PROCEDURE — 36416 COLLJ CAPILLARY BLOOD SPEC: CPT

## 2023-01-24 PROCEDURE — 99213 OFFICE O/P EST LOW 20 MIN: CPT

## 2023-01-24 RX ORDER — WARFARIN SODIUM 5 MG/1
TABLET ORAL
Qty: 35 TABLET | Refills: 1 | Status: SHIPPED | OUTPATIENT
Start: 2023-01-24

## 2023-01-24 RX ORDER — ENOXAPARIN SODIUM 150 MG/ML
150 INJECTION SUBCUTANEOUS EVERY 12 HOURS
Qty: 16 ML | Refills: 0 | Status: SHIPPED | OUTPATIENT
Start: 2023-01-24

## 2023-01-24 NOTE — PATIENT INSTRUCTIONS
Medication Management [address] [place] [address] Instruction Sheet  Patient:   Elva Isaacs                                               YOB: 1970     Procedure:  Lumbar Injection                                                 Date of Procedure:  1/30/23     Day Lovenox AM Lovenox PM Coumadin PM      1/25/23    none    none    none         1/26/23    none    none    none         1/27/23       150 mg    150 mg    none      1/28/23       150 mg    150 mg    none      1/29/23       150 mg    none    none      1/30/23       none    none    none      1/31/23       none    150 mg    5 mg      2/1/23       150 mg    150 mg    5 mg      2/2/23       150 mg    150 mg    5 mg      2/3/23       150 mg    150 mg    5 mg      2/4/23       150 mg    150 mg    5 mg      2/5/23       150 mg    150 mg    5 mg                                                                                                      INR to be checked:  2/6/23  Corby Labor, H/12/22/22     Clinical Pharmacist/Date     Any questions please call [facility] at [phone]

## 2023-01-24 NOTE — PROGRESS NOTES
Medication Management [address]   [phone] (phone)  [phone] (fax)    Mr. Lyla Pryor is a 45 y.o.  male with history of PE, Afib who presents today for anticoagulation monitoring and adjustment. Patient verifies current dosing regimen and tablet strength. No missed or extra doses. Patient denies s/s bleeding/bruising/swelling/SOB/chest pain  No blood in urine or stool. No dietary changes. No changes in medication/OTC agents/Herbals. OTC Advil PM taking 2 at night taking for pain. Discussed to use sparingly, to take on a full stomach and risk of GI bleeding. Recommended tylenol for any breakthrough pain and discuss with provider if still having pain. No change in alcohol use or tobacco use. No change in activity level. Patient denies headaches/dizziness/lightheadedness/falls. No vomiting/diarrhea or acute illness. No Procedures scheduled in the future at this time. Assessment:   Lab Results   Component Value Date    INR 2.10 (H) 01/24/2023    INR 3.00 (H) 01/11/2023    INR 1.80 (H) 12/22/2022     INR therapeutic   Recent Labs     01/24/23  1105   INR 2.10*   Goal 2-3    Plan:  Continue Coumadin 5 mg daily. Hold for procedure 1/25-1-30. Follow Lovenox schedule as listed below. Resume Coumadin on 1/31. Recheck INR in 13 days on 2/6/23. Patient reminded to call the Anticoagulation Clinic with any signs or symptoms of bleeding or with any medication changes. Patient given instructions utilizing the teach back method.      Medication Management [address] [facility] Instruction Sheet  Patient:   Lyla Pryor                                               YOB: 1970     Procedure:  Lumbar Injection                                                 Date of Procedure:  1/30/23     Day Lovenox AM Lovenox PM Coumadin PM      1/25/23    none    none    none         1/26/23    none none    none         1/27/23       150 mg    150 mg    none      1/28/23       150 mg    150 mg    none      1/29/23       150 mg    none    none      1/30/23       none    none    none      1/31/23       none    150 mg    5 mg      2/1/23       150 mg    150 mg    5 mg      2/2/23       150 mg    150 mg    5 mg      2/3/23       150 mg    150 mg    5 mg      2/4/23       150 mg    150 mg    5 mg      2/5/23       150 mg    150 mg    5 mg                                                                                                      INR to be checked:  2/6/23  Lazarus Broaden, [facility]/12/22/22     Clinical Pharmacist/Date     Any questions please call [facility]tico[facility] at [phone]    After visit summary printed and reviewed with patient. Discharged ambulatory in no apparent distress.     Discussed plan with Lynette Sethi, PharmD    Indu Yung, [address]  1/24/2023 11:40 AM     For Pharmacy Admin Tracking Only    Intervention Detail: Dose Adjustment: 2, reason: Therapy Optimization, New Rx: 1, reason: Needs Additional Therapy, and Refill(s) Provided  Total # of Interventions Recommended: 4  Total # of Interventions Accepted: 4  Time Spent (min): 30

## 2023-01-26 ENCOUNTER — PREP FOR PROCEDURE (OUTPATIENT)
Dept: PHYSICAL MEDICINE AND REHAB | Age: 53
End: 2023-01-26

## 2023-01-26 NOTE — DISCHARGE INSTRUCTIONS
ANESTHESIA INSTRUCTIONS FOLLOWING SURGERY        Since you may experience some intermittent light-headedness for the next several hours, we suggest you plan on bed rest or quiet relaxation this evening. You must have a friend or relative stay with you tonight. Because of the sedation you have received, it is recommended that you do not drive a motor vehicle, operate any kind of machinery, or sign any contractual agreement for 24 hours following the procedure. You should not take alcoholic beverages tonight and only take sleeping medication that has been specifically prescribed for you by your physician. Call office [phone] if you have:  Temperature greater than 100.4  Persistent nausea and vomiting  Severe uncontrolled pain  Redness, tenderness, or signs of infection (pain, swelling, redness, odor or green/yellow discharge around the site)  Difficulty breathing, headache or visual disturbances  Hives  Persistent dizziness or light-headedness  Extreme fatigue  Any other questions or concerns you may have after discharge    In an emergency, call 911 or go to an Emergency Department at a nearby hospital    It is important to bring a complete, current list of your medications to any medical appointments or hospitalizations. REMINDER:   Carry a list of your medications and allergies with you at all times  Call your pharmacy at least 1 week in advance to refill prescriptions    Diet: Resume your usual diet. Good nutrition promotes healing. Increase fluid intake. Activity: Rest for 24 hrs then resume normal activity. HOME MEDICATIONS:      If on blood thinning products such as; Aspirin, NSAIDS, Plavix, Coumadin, Xarelto, Fish Oil, Multi-Vitamins or Herbal Supplements restart in 24 hours      Restart Metformin in 48 hours if you had procedure with dye. Restart Metformin in 24 hours if no dye used during procedure.         Education Materials Received: {yes/no:575331}  Belongings Returned: {yes/no:962188}          I understand and acknowledge receipt of the above instructions. Patient or Guardian Signature                                                         Date/Time                                                                                                                                            Physician's or R.N.'s Signature                                                                  Date/Time      The discharge instructions have been reviewed with the patient and/or Guardian. Patient and/or Guardian signed and retained a printed copy. Surgical Site Infections        How can we work together to prevent Surgical Site Infections? We would like to thank you for choosing [facility] for your Surgical Care. Below you will find helpful information on how we can work together to prevent Surgical Site Infections. What is a Surgical Site Infection (SSI)? A surgical site infection is an infection that occurs after surgery in the part of the body where the surgery took place. Most patients who have surgery do not develop an infection. However, infections develop in about 1 to 3 out of every 100 patients who have surgery. Some of the common symptoms of a surgical site infection are:  Redness and pain around the area where you had surgery  Drainage of cloudy fluid from your surgical wound  Fever    Can SSIs be treated? Yes. Most surgical site infections can be treated with antibiotics. The antibiotic given to you depends on the bacteria (germs) causing the infection. Sometimes patients with SSIs also need another surgery to treat the infection. What are some of the things that hospitals are doing to prevent SSIs? To prevent SSIs, doctors, nurses, and other healthcare providers:   May remove some of your hair immediately before your surgery using electric clippers if the hair is in the same area where the procedure will occur. They should not shave you with a razor. Give you antibiotics before your surgery starts. In most cases, you should get antibiotics within 60 minutes before the surgery starts and the antibiotics should be stopped within 24 hours after surgery. Clean the skin at the site of your surgery with a special soap that kills germs. Clean their hands and arms up to their elbows with an antiseptic agent just before the surgery. Wear special hair covers, masks, gowns, and gloves during surgery to  keep the surgery area clean. Clean their hands with soap and water or an alcohol-based hand rub before and after caring for each patient. If you do not see your providers clean their hands, please     ask  them to do so. What can I do to help prevent SSIs? Before your surgery:  Tell your doctor about other medical problems you may have. Health problems such as allergies, diabetes, and obesity could affect your surgery and your treatment. Quit smoking. Patients who smoke get more infections. Talk to your doctor about how you can quit before your surgery. Do not shave near where you will have surgery. Shaving with a razor can irritate your skin and make it easier to develop an infection. At the time of your surgery:  Speak up if someone tries to shave you with a razor before surgery. Ask why you need to be shaved and talk with your surgeon if you have any concerns. Ask if you will get antibiotics before surgery. After your surgery:  Make sure that your healthcare providers clean their hands before examining you, either with soap and water or an alcohol-based hand rub. Family and friends who visit you should not touch the surgical wound or dressings. Family and friends should clean their hands with soap and water or an alcohol-based hand rub before and after visiting you.  If you do not see them clean their hands, ask them to clean their hands. What do I need to do when I go home from the hospital?  Before you go home, your doctor or nurse should explain everything you need to know about taking care of your wound. Make sure you understand how to care for your wound before you leave the hospital.  Always clean your hands before and after caring for your wound. Before you go home, make sure you know who to contact if you have questions or problems after you get home. If you have any symptoms of an infection, such as redness and pain at the surgery site, drainage, or fever, call your doctor immediately. If you have additional questions, please ask your doctor or nurse. Call office [phone] if you have:  Temperature greater than 100.4  Persistent nausea and vomiting  Severe uncontrolled pain  Redness, tenderness, or signs of infection (pain, swelling, redness, odor or green/yellow discharge around the site)  Difficulty breathing, headache or visual disturbances  Hives  Persistent dizziness or light-headedness  Extreme fatigue  Any other questions or concerns you may have after discharge    In an emergency, call 911 or go to an Emergency Department at a nearby hospital    It is important to bring a complete, current list of your medications to any medical appointments or hospitalizations. REMINDER:   Carry a list of your medications and allergies with you at all times  Call your pharmacy at least 1 week in advance to refill prescriptions    Diet: Resume your usual diet. Good nutrition promotes healing. Increase fluid intake. Activity: Rest for 24 hrs then resume normal activity. Education Materials Received: {yes/no:981900}  Belongings Returned: {yes/no:173330}          I understand and acknowledge receipt of the above instructions.                                                                                                                                           Patient or Guardian Signature                                                         Date/Time                                                                                                                                            Physician's or R.N.'s Signature                                                                  Date/Time      The discharge instructions have been reviewed with the patient and/or Guardian. Patient and/or Guardian signed and retained a printed copy.

## 2023-01-27 ENCOUNTER — HOSPITAL ENCOUNTER (OUTPATIENT)
Dept: WOUND CARE | Age: 53
Discharge: HOME | End: 2023-01-27
Payer: MEDICARE

## 2023-01-27 VITALS
DIASTOLIC BLOOD PRESSURE: 86 MMHG | OXYGEN SATURATION: 92 % | HEART RATE: 86 BPM | TEMPERATURE: 97.7 F | RESPIRATION RATE: 18 BRPM | SYSTOLIC BLOOD PRESSURE: 143 MMHG

## 2023-01-27 DIAGNOSIS — I89.0 LYMPHEDEMA OF BOTH LOWER EXTREMITIES: Primary | ICD-10-CM

## 2023-01-27 DIAGNOSIS — I87.2 VENOUS INSUFFICIENCY OF BOTH LOWER EXTREMITIES: ICD-10-CM

## 2023-01-27 DIAGNOSIS — I89.0 LYMPHEDEMA: ICD-10-CM

## 2023-01-27 DIAGNOSIS — I87.2 CHRONIC VENOUS STASIS DERMATITIS OF BOTH LOWER EXTREMITIES: ICD-10-CM

## 2023-01-27 PROCEDURE — 99213 OFFICE O/P EST LOW 20 MIN: CPT | Performed by: NURSE PRACTITIONER

## 2023-01-27 PROCEDURE — 99203 OFFICE O/P NEW LOW 30 MIN: CPT

## 2023-01-27 RX ORDER — AMMONIUM LACTATE 12 G/100G
LOTION TOPICAL
Qty: 1 EACH | Refills: 1 | Status: SHIPPED | OUTPATIENT
Start: 2023-01-27

## 2023-01-27 ASSESSMENT — PAIN DESCRIPTION - LOCATION: LOCATION: LEG;FOOT

## 2023-01-27 ASSESSMENT — PAIN DESCRIPTION - ORIENTATION: ORIENTATION: LEFT

## 2023-01-27 ASSESSMENT — PAIN SCALES - GENERAL: PAINLEVEL_OUTOF10: 5

## 2023-01-27 NOTE — DISCHARGE INSTRUCTIONS
Visit Discharge/Physician Orders:    Prescription for lacHydrin sent to pharmacy today. Apply to legs daily as needed for itching. Referral sent to vein care center, they should be reaching out to schedule  There number is [phone]    Wound Location: Left leg - no open wounds    Follow up visit:   As needed. Call with any concerns. Keep next scheduled appointment. Please give 24 hour notice if unable to keep appointment. [phone]    If you experience any of the following, please call the Wound Care Service during business hours: Monday through Friday 8:00 am - 4:30 pm  ([phone]). *Increase in pain   *Temperature over 101   *Increase in drainage from your wound or a foul odor   *Uncontrolled swelling   *Need for compression bandage changes due to slippage, breakthrough drainage    If you need medical attention outside of business hours, please contact your Primary Care Doctor or go to the nearest emergency room.

## 2023-01-27 NOTE — PROGRESS NOTES
[address] [place] [address] and Procedure Note      Link General  MEDICAL RECORD NUMBER:  [id number]  AGE: 45 y.o. GENDER: male  : 1970  EPISODE DATE:  2023    SUBJECTIVE:     Chief Complaint   Patient presents with    Wound Check     Left leg          HISTORY OF PRESENT ILLNESS      Jarvis Alcala is a 45 y.o. male who presents today for evaluation of left lower leg edema. Patient reports long standing history of swelling to his legs, has never worn compression. He reports intense itching to legs, scratches frequently. He denies any open wounds or drainage. Does have history of DVT to LLE, PE, Afib is on chronic coumadin for this problem. He denies any further needs or concerns,.     PAST MEDICAL HISTORY             Diagnosis Date    Ankylosing spondylitis ([address]     Aortic stenosis, mild to moderate     Atrial fibrillation (HCC)     BPH (benign prostatic hyperplasia)     Carpal tunnel syndrome on right     rt hand    Chronic gout     COVID-19 2020    DM2 (diabetes mellitus, type 2) ([address]     DVT (deep venous thrombosis) (HCC)     Dyslipidemia     GERD (gastroesophageal reflux disease)     Heart failure with preserved ejection fraction (HCC)     History of alcohol abuse     History of osteomyelitis     Hx of R foot wound, osteomyelitis 2018 requiring surgery and IV Vanco    Hypertension, essential     Hypogonadism, male     Major depression     Mood disorder ([address]     Morbid obesity ([address]     DURAN (nonalcoholic steatohepatitis)     KIARA treated with BiPAP     Pulmonary emboli ([address]     Vitamin D deficiency        PAST SURGICAL HISTORY     Past Surgical History:   Procedure Laterality Date    COLONOSCOPY N/A 11/15/2020    COLONOSCOPY DIAGNOSTIC performed by Marsha Duffy MD at [facility]    ENDOSCOPY, COLON, DIAGNOSTIC      FOOT SURGERY Right     2018, R foot osteomyelitis    HIP SURGERY Left 2020    bilateral hip steroid injection, Left HIP FIRST performed by Vivian Linton MD at [facility] Bilateral 4/11/2022    bilateral L-facet MBB # 1 @ L4-5 and L5-S1 performed by Vivian Linton MD at [facility] N/A 5/23/2022    LESI  @ L5. performed by Vivian Linton MD at [address] [facility] N/A 10/26/2020    SIGMOIDOSCOPY DIAGNOSTIC FLEXIBLE performed by Everett Carpenter MD at [address] [place] [address]      as a baby    UPPER GASTROINTESTINAL ENDOSCOPY N/A 11/14/2020    EGD DIAGNOSTIC ONLY performed by Kath Cantu MD at [facility]    UPPER GASTROINTESTINAL ENDOSCOPY N/A 12/27/2021    EGD performed by Kath Cantu MD at [facility]       FAMILY HISTORY     Family History   Problem Relation Age of Onset    Heart Disease Mother         MI    Cancer Paternal Aunt         lung       SOCIAL HISTORY     Social History     Tobacco Use    Smoking status: Never    Smokeless tobacco: Never   Vaping Use    Vaping Use: Never used   Substance Use Topics    Alcohol use: Not Currently     Comment: hx of abuse    Drug use: No       ALLERGIES     Allergies   Allergen Reactions    Penicillins      Tolerated Zosyn 9/24/2020       MEDICATIONS     Current Outpatient Medications on File Prior to Encounter   Medication Sig Dispense Refill    furosemide (LASIX) 20 MG tablet TAKE 1 TABLET BY MOUTH DAILY 30 tablet 0    amLODIPine (NORVASC) 10 MG tablet TAKE 1 TABLET BY MOUTH DAILY 30 tablet 0    warfarin (COUMADIN) 5 MG tablet Take as directed by [facility] (#35 ~ 30 day supply) 35 tablet 1    enoxaparin (LOVENOX) 150 MG/ML SOSY injection Inject 1 mL into the skin in the morning and 1 mL in the evening.  16 mL 0    potassium chloride (KLOR-CON M10) 10 MEQ extended release tablet Take 1 tablet by mouth 2 times daily 60 tablet 3    oxyCODONE-acetaminophen (PERCOCET) 5-325 MG per tablet Take 1 tablet by mouth 2 times daily as needed for Pain.      hydrALAZINE (APRESOLINE) 50 MG tablet Take 1 tablet by mouth 3 times daily 90 tablet 3    escitalopram (LEXAPRO) 20 MG tablet Take 20 mg by mouth daily      vitamin E 400 UNIT capsule Take 400 Units by mouth daily      Vitamin A 3 MG (87552 UT) TABS Take 3 mg by mouth daily      pregabalin (LYRICA) 75 MG capsule Take 1 capsule by mouth 3 times daily for 30 days. 90 capsule 0    allopurinol (ZYLOPRIM) 300 MG tablet Take 1 tablet by mouth daily 90 tablet 0    vitamin C (ASCORBIC ACID) 500 MG tablet Take 2 tablets by mouth daily 30 tablet 0    CHOLECALCIFEROL PO Take 2,000 Units by mouth daily      atorvastatin (LIPITOR) 40 MG tablet Take 40 mg by mouth nightly      acetaminophen (TYLENOL) 325 MG tablet Take 650 mg by mouth every 4 hours as needed for Pain      busPIRone (BUSPAR) 10 MG tablet Take 10 mg by mouth 2 times daily       Multiple Vitamins-Minerals (THERAPEUTIC MULTIVITAMIN-MINERALS) tablet Take 1 tablet by mouth daily      Probiotic Product (SOBIA-BID PROBIOTIC PO) Take 1 tablet by mouth daily       sitaGLIPtan-metFORMIN (JANUMET)  MG per tablet Take 1 tablet by mouth 2 times daily (with meals)       ARIPiprazole (ABILIFY PO) Take 15 mg by mouth daily       Blood Glucose Monitoring Suppl (FREESTYLE LITE) ARTIE Patient needs all supplies for qd testing. DX: 250.00 1 Device 11     No current facility-administered medications on file prior to encounter. REVIEW OF SYSTEMS:     Comprehensive ROS completed, pertinent items per HPI    PHYSICAL EXAM:     BP (!) 143/86   Pulse 86   Temp 97.7 °F (36.5 °C) (Infrared)   Resp 18   SpO2 92%   Wt Readings from Last 3 Encounters:   01/19/23 (!) 302 lb 3.2 oz (137.1 kg)   01/19/23 (!) 302 lb 3.2 oz (137.1 kg)   01/17/23 (!) 304 lb 6.4 oz (138.1 kg)       General:  Awake, alert, no apparent distress. Appears stated age  [de-identified]: conjuctivae are clear without exudate or hemorrhage, anicteric sclera, moist oral mucosa.   Chest:  Respirations regular, non-labored. Chest rise and fall equal bilaterally. Neurological: Awake, alert, oriented x4  Psychiatric:  Appropriate mood and affect  Extremities: non-traumatic in appearance. Bilateral lower extremity edema, hyperkeratosis and hemosiderin staining noted. No open wounds, erythema or warmth to the touch noted. Skin:  Warm and dry    LABS/IMAGING     CBC: No results for input(s): WBC, HGB, PLT in the last 72 hours. BMP:  No results for input(s): NA, K, CL, CO2, BUN, CREATININE, GLUCOSE in the last 72 hours. Calcium:No results for input(s): CALCIUM in the last 72 hours. Ionized Calcium:No results for input(s): IONCA in the last 72 hours. Magnesium:No results for input(s): MG in the last 72 hours. Phosphorus:No results for input(s): PHOS in the last 72 hours. BNP:No results for input(s): BNP in the last 72 hours. Glucose:No results for input(s): POCGLU in the last 72 hours. HgbA1C: No results for input(s): LABA1C in the last 72 hours. INR:   Recent Labs     01/24/23  1105   INR 2.10*     Hepatic: No results for input(s): ALKPHOS, ALT, AST, PROT, BILITOT, BILIDIR, LABALBU in the last 72 hours. Amylase and Lipase:No results for input(s): LACTA, AMYLASE in the last 72 hours. Lactic Acid: No results for input(s): LACTA in the last 72 hours. Troponin: No results for input(s): CKTOTAL, CKMB, TROPONINI in the last 72 hours. BNP: No results for input(s): BNP in the last 72 hours. Urine Culture:  No components found for: CURINE      UA: No results for input(s): SPECGRAV, PHUR, COLORU, CLARITYU, MUCUS, PROTEINU, BLOODU, RBCUA, [address], BACTERIA, NITRU, GLUCOSEU, BILIRUBINUR, UROBILINOGEN, KETUA, LABCAST, LABCASTTY, AMORPHOS in the last 72 hours.     Invalid input(s): CRYSTALS  Micro:   Lab Results   Component Value Date/Time    BC No growth-preliminary No growth 12/05/2021 10:29 AM       IMAGING:  No orders to display       ASSESSMENT     -Lymphedema  -Chronic venous stasis dermatitis     Patient Active Problem List   Diagnosis Code    History of Parox atrial fibrillation Z86.79    Atrial fibrillation ([facility]) I48.91    BPH (benign prostatic hyperplasia) N40.0    Carpal tunnel syndrome on right G56.01    Chronic gout M1A. 9XX0    DM2 (diabetes mellitus, type 2) ([facility]) E11.9    Heart failure with preserved ejection fraction ([facility]) I50.30    History of alcohol abuse F10.11    History of osteomyelitis Z87.39    Hypogonadism, male E29.1    Major depression F32.9    Morbid obesity ([facility]) E66.01    DURAN (nonalcoholic steatohepatitis) K75.81    KIARA treated with BiPAP G47.33    Vitamin D deficiency E55.9    Physical deconditioning R53.81    Mood disorder ([facility]) F39    GERD (gastroesophageal reflux disease) K21.9    Primary osteoarthritis of left hip M16.12    Hypertension, essential I10    Dyslipidemia E78.5    Aortic stenosis, mild to moderate I35.0    Perirectal abscess K61.1    Elevated alkaline phosphatase level R74.8    Hx of Chest pain, atypical R07.89    Lumbar spondylosis M47.816    Lumbar facet arthropathy M47.816    Lumbar radiculitis M54.16    Spinal stenosis of lumbar region with neurogenic claudication M48.062    Ankylosing spondylitis ([facility]) M45.9    Mid back pain M54.9    Pulmonary embolism ([facility]) I26.99    Anticoagulated on Coumadin Z79.01    Encounter for therapeutic drug monitoring Z51.81    Pre-op evaluation for epidural; injection Z01.818    Bilateral leg edema +1 to +2 with wound on the left R60.0       PLAN     Patient examined and evaluated. All available lab work, radiology studies, and progress notes pertaining to Ino Tapia reviewed prior to or during patient visit today.    -Lymphedema, Chronic venous stasis dermatitis- No open wounds or drainage noted to legs today. Etiology of venous disease discussed with patient at today's visit.   Reviewed the importance of lifelong control of edema through compression, elevation of legs while at rest, and regular exercise in prevention of of wounds and infection. \Advised patient not to scratch or rub at legs as this puts him at higher risk for wound and infection. Prescription for LacHydrin sent to pharmacy today to help with pruritus. Apply to intact skin daily as needed. Referral placed to vein care center for ongoing management. Patient agreeable.    -No indications of infection noted at today's visit. Education provided on signs and symptoms of infection. Call clinic or seek emergency care should these occur. Follow up as needed. Call clinic for any further needs or concerns. All questions and concerns addressed prior to discharge from today's visit. Please see attached Discharge Instructions    Written patient dismissal instructions given to patient and signed by patient or POA. Treatment:   Orders Placed This Encounter   Procedures    [facility] - Eric Mallory MD, Vascular Medicine, [facility]     Referral Priority:   Routine     Referral Type:   Eval and Treat     Referral Reason:   Specialty Services Required     Referred to Provider:   Cheryle Dials, MD     Requested Specialty:   Family Medicine     Number of Visits Requested:   1       I spent a total of 20 minutes face to face with Arlyn Dunlap  on 1/27/2023. Time spent includes review of previous progress notes, reviewing and discussing test results, education on plan of care for presenting diagnosis, answering questions, providing instructions on treatment and/or medications ordered, reviewing importance of compliance with outline treatment plan and any identifiable barriers to compliance and coordination of care based on plan and assessment as noted. Discharge Instructions         Discharge Instructions         Visit Discharge/Physician Orders:    Prescription for lacHydrin sent to pharmacy today. Apply to legs daily as needed for itching.     Referral sent to vein care center, they should be reaching out to schedule  There number is [phone]    Wound Location: Left leg - no open wounds    Follow up visit:   As needed. Call with any concerns. Keep next scheduled appointment. Please give 24 hour notice if unable to keep appointment. [phone]    If you experience any of the following, please call the Wound Care Service during business hours: Monday through Friday 8:00 am - 4:30 pm  ([phone]). *Increase in pain   *Temperature over 101   *Increase in drainage from your wound or a foul odor   *Uncontrolled swelling   *Need for compression bandage changes due to slippage, breakthrough drainage    If you need medical attention outside of business hours, please contact your Primary Care Doctor or go to the nearest emergency room.                   Electronically signed by SANDRA Denson CNP on 1/27/2023 at 10:48 AM

## 2023-01-27 NOTE — PLAN OF CARE
Problem: Skin/Tissue Integrity  Goal: Absence of new skin breakdown  Description: 1. Monitor for areas of redness and/or skin breakdown  Outcome: Progressing    Patient presents to wound clinic for left leg. No open wounds noted. See avs for discharge instructions. Follow up as needed. Care plan reviewed with patient. Patient verbalize understanding of the plan of care and contribute to goal setting. 5x Sit to Stand Test : 20 seconds c the assist of the armrests of the chair.

## 2023-01-30 ENCOUNTER — HOSPITAL ENCOUNTER (OUTPATIENT)
Age: 53
Setting detail: OUTPATIENT SURGERY
Discharge: HOME | End: 2023-01-30
Attending: PAIN MEDICINE | Admitting: PAIN MEDICINE
Payer: MEDICARE

## 2023-01-30 ENCOUNTER — ANESTHESIA (OUTPATIENT)
Dept: OPERATING ROOM | Age: 53
End: 2023-01-30
Payer: MEDICARE

## 2023-01-30 ENCOUNTER — ANESTHESIA EVENT (OUTPATIENT)
Dept: OPERATING ROOM | Age: 53
End: 2023-01-30
Payer: MEDICARE

## 2023-01-30 ENCOUNTER — APPOINTMENT (OUTPATIENT)
Dept: GENERAL RADIOLOGY | Age: 53
End: 2023-01-30
Attending: PAIN MEDICINE
Payer: MEDICARE

## 2023-01-30 VITALS
DIASTOLIC BLOOD PRESSURE: 74 MMHG | HEART RATE: 83 BPM | HEIGHT: 68 IN | RESPIRATION RATE: 16 BRPM | TEMPERATURE: 97.6 F | OXYGEN SATURATION: 94 % | BODY MASS INDEX: 45.77 KG/M2 | WEIGHT: 302 LBS | SYSTOLIC BLOOD PRESSURE: 128 MMHG

## 2023-01-30 LAB
GLUCOSE BLD STRIP.AUTO-MCNC: 166 MG/DL (ref 70–108)
POC INR: 1.2 (ref 0.8–1.2)

## 2023-01-30 PROCEDURE — 3700000000 HC ANESTHESIA ATTENDED CARE: Performed by: PAIN MEDICINE

## 2023-01-30 PROCEDURE — 6360000002 HC RX W HCPCS: Performed by: PAIN MEDICINE

## 2023-01-30 PROCEDURE — 7100000010 HC PHASE II RECOVERY - FIRST 15 MIN: Performed by: PAIN MEDICINE

## 2023-01-30 PROCEDURE — 6360000002 HC RX W HCPCS: Performed by: NURSE ANESTHETIST, CERTIFIED REGISTERED

## 2023-01-30 PROCEDURE — 7100000011 HC PHASE II RECOVERY - ADDTL 15 MIN: Performed by: PAIN MEDICINE

## 2023-01-30 PROCEDURE — 2709999900 HC NON-CHARGEABLE SUPPLY: Performed by: PAIN MEDICINE

## 2023-01-30 PROCEDURE — 62323 NJX INTERLAMINAR LMBR/SAC: CPT | Performed by: PAIN MEDICINE

## 2023-01-30 PROCEDURE — 3209999900 FLUORO FOR SURGICAL PROCEDURES

## 2023-01-30 PROCEDURE — A4216 STERILE WATER/SALINE, 10 ML: HCPCS | Performed by: PAIN MEDICINE

## 2023-01-30 PROCEDURE — 2500000003 HC RX 250 WO HCPCS: Performed by: NURSE ANESTHETIST, CERTIFIED REGISTERED

## 2023-01-30 PROCEDURE — 3600000054 HC PAIN LEVEL 3 BASE: Performed by: PAIN MEDICINE

## 2023-01-30 PROCEDURE — 6360000004 HC RX CONTRAST MEDICATION: Performed by: PAIN MEDICINE

## 2023-01-30 PROCEDURE — 82948 REAGENT STRIP/BLOOD GLUCOSE: CPT

## 2023-01-30 PROCEDURE — 2580000003 HC RX 258: Performed by: PAIN MEDICINE

## 2023-01-30 PROCEDURE — 2500000003 HC RX 250 WO HCPCS: Performed by: PAIN MEDICINE

## 2023-01-30 RX ORDER — LIDOCAINE HYDROCHLORIDE 20 MG/ML
INJECTION, SOLUTION EPIDURAL; INFILTRATION; INTRACAUDAL; PERINEURAL PRN
Status: DISCONTINUED | OUTPATIENT
Start: 2023-01-30 | End: 2023-01-30 | Stop reason: SDUPTHER

## 2023-01-30 RX ORDER — SODIUM CHLORIDE 9 MG/ML
INJECTION INTRAVENOUS PRN
Status: DISCONTINUED | OUTPATIENT
Start: 2023-01-30 | End: 2023-01-30 | Stop reason: ALTCHOICE

## 2023-01-30 RX ORDER — PROPOFOL 10 MG/ML
INJECTION, EMULSION INTRAVENOUS PRN
Status: DISCONTINUED | OUTPATIENT
Start: 2023-01-30 | End: 2023-01-30 | Stop reason: SDUPTHER

## 2023-01-30 RX ORDER — DEXAMETHASONE SODIUM PHOSPHATE 4 MG/ML
INJECTION, SOLUTION INTRA-ARTICULAR; INTRALESIONAL; INTRAMUSCULAR; INTRAVENOUS; SOFT TISSUE PRN
Status: DISCONTINUED | OUTPATIENT
Start: 2023-01-30 | End: 2023-01-30 | Stop reason: ALTCHOICE

## 2023-01-30 RX ORDER — LIDOCAINE HYDROCHLORIDE 10 MG/ML
INJECTION, SOLUTION INFILTRATION; PERINEURAL PRN
Status: DISCONTINUED | OUTPATIENT
Start: 2023-01-30 | End: 2023-01-30 | Stop reason: ALTCHOICE

## 2023-01-30 RX ADMIN — PROPOFOL 40 MG: 10 INJECTION, EMULSION INTRAVENOUS at 10:39

## 2023-01-30 RX ADMIN — LIDOCAINE HYDROCHLORIDE 40 MG: 20 INJECTION, SOLUTION EPIDURAL; INFILTRATION; INTRACAUDAL; PERINEURAL at 10:37

## 2023-01-30 ASSESSMENT — PAIN - FUNCTIONAL ASSESSMENT: PAIN_FUNCTIONAL_ASSESSMENT: 0-10

## 2023-01-30 NOTE — PROGRESS NOTES
No falls during this shift so far. Pt educated to not get up per self to prevent falls. Pt educated on appropriate use of call light to obtain assistance as needed. Pt compliant with using call light to obtain assistance as needed. Pt ambulates with x 1 staff assistance with cane, tolerates well. Bed locked & in lowest position. Bedside table in reach to facilitate reaching belongings. Pathway clear to prevent tripping over clutter. Safe environment maintained. Call light in reach to facilitate usage. Hourly rounding performed. Fall armband in place.

## 2023-01-30 NOTE — PROGRESS NOTES
1046 To recovery via cart. SPont resp. VSS. Report received from surgical rn. Pt denies pain numbness tingling or nausea. HOB elevated. Snack and drink given. Call bell in reach. 1100 IV discontinued.  Up to dress self  1115 Discharge to home in stable condition per wheelchair with friend

## 2023-01-30 NOTE — H&P
H&P    Patient continues to have pain in low back aching, stiff \"like knots and twisting\" and pain radiating mainly down left leg posterior to calf- tingling, numbness, shooting and aching. Has shooting and burning in right leg as well with numbness in toes. Pain has been up and down and increased with activity. Pain is more bothersome in legs compared to low back      Had ER visit at [facility] in August for chest pain and SOB  found to have a PE on anticoagulation and spent time in [facility] for therapy needs   Pain increases with bending, lifting, twisting , walking, standing, getting up and down, and housework or working at job. Continues Norco and Lyrica which helps take the edge off. States that he was on Percocet in the hospital and [facility]CF and that worked better. He is asking to change to Percocet but I discussed focusing on interventional procedures for pain      Medications reviewed. Patient denies side effects with medications. Patient states he is taking medications as prescribed. Hedenies receiving pain medications from other sources. He  had 2 ER visits since last visit   any ER visits since last visit. Pain scale with out pain medications or at its worst is 8/10. Pain scale with pain medications or at its best is 4-5/10. Last dose of Lyrica and Norco was today   Drug screen reviewed from 6/7/2022 and was appropriate  Pill count was not completed today and instructed  he needs to bring to appointments         The patientis allergic to penicillins. Subjective:      Review of Systems   Eyes: Negative. Respiratory: Negative. Negative for choking and shortness of breath. Cardiovascular:  Positive for leg swelling. Musculoskeletal:  Positive for arthralgias, back pain, gait problem, joint swelling and myalgias. Negative for neck pain. Ambulating with quad cane    Skin: Negative. Neurological:  Positive for weakness and numbness. Psychiatric/Behavioral: Negative.         Objective: Vitals       Vitals:     09/26/22 1210   BP: 136/84   Weight: 292 lb (132.5 kg)   Height: 5' 8\" (1.727 m)            Physical Exam  Constitutional:       Appearance: Normal appearance. HENT:      Head: Normocephalic and atraumatic. Right Ear: External ear normal.      Left Ear: External ear normal.      Nose: Nose normal.      Mouth/Throat:      Mouth: Mucous membranes are moist.      Pharynx: Oropharynx is clear. Eyes:      General:         Right eye: No discharge. Extraocular Movements: Extraocular movements intact. Conjunctiva/sclera: Conjunctivae normal.      Pupils: Pupils are equal, round, and reactive to light. Cardiovascular:      Rate and Rhythm: Normal rate and regular rhythm. Pulses: Decreased pulses. Pulmonary:      Effort: Pulmonary effort is normal.      Breath sounds: Normal breath sounds. Abdominal:      General: Abdomen is flat. Bowel sounds are normal.   Musculoskeletal:         General: Tenderness present. Right shoulder: Decreased range of motion. Left shoulder: Decreased range of motion. Right wrist: Decreased range of motion. Right hand: Decreased range of motion. Cervical back: Normal range of motion and neck supple. Bony tenderness present. No tenderness. Lumbar back: Tenderness and bony tenderness present. No swelling. Decreased range of motion. Positive right straight leg raise test and positive left straight leg raise test.        Back:     Right hip: Tenderness and bony tenderness present. Decreased range of motion. Decreased strength. Left hip: Tenderness and bony tenderness present. Decreased range of motion. Decreased strength. Right upper leg: Tenderness and bony tenderness present. Right knee: Swelling present. Decreased range of motion. Tenderness present. Left knee: Swelling present. Decreased range of motion. Tenderness present.       Right lower leg: Swelling, tenderness and bony tenderness present. 1+ Pitting Edema present. Left lower leg: Swelling, tenderness and bony tenderness present. 1+ Pitting Edema present. Right ankle: Swelling present. Tenderness present. Decreased range of motion. Left ankle: Swelling present. Tenderness present. Decreased range of motion. Left foot: Decreased range of motion. Tenderness present. Skin:     General: Skin is warm and dry. Findings: Bruising present. Neurological:      General: No focal deficit present. Mental Status: He is alert and oriented to person, place, and time. Sensory: Sensory deficit present. Motor: Weakness present. Coordination: Coordination abnormal.      Gait: Gait abnormal.      Deep Tendon Reflexes:      Reflex Scores:       Tricep reflexes are 2+ on the right side and 2+ on the left side. Bicep reflexes are 2+ on the right side and 2+ on the left side. Brachioradialis reflexes are 2+ on the right side and 2+ on the left side. Patellar reflexes are 1+ on the right side and 1+ on the left side. Achilles reflexes are 1+ on the right side and 1+ on the left side. Comments: Strength 4/5 bilateral lower extremities      Numbness and decreased sensation bilateral feet. Psychiatric:         Mood and Affect: Mood normal.      BIGG  Patricks test  positive  Yeoman's  or Gaenslen's positive        Assessment:      1. Lumbosacral radiculitis    2. Spinal stenosis of lumbar region with neurogenic claudication    3. Lumbar spondylosis    4. Lumbar facet arthropathy    5. Nerve pain    6. Chronic pain syndrome       Plan:      OARRS reviewed. Current MED: 20.00  Patient was offered naloxone for home. Discussed long term side effects of medications, tolerance, dependency and addiction. Previous UDS reviewed  UDS preformed today for compliance. Patient told can not receive any pain medications from any other source.   No evidence of abuse, diversion or aberrant behavior. Medications and/or procedures to improve function and quality of life- patient understanding with this and that may not be pain free  Discussed with patient about safe storage of medications at home  Discussed possible weaning of medication dosing dependent on treatment/procedure results. Discussed with patient about risks with procedure including infection, reaction to medication, increased pain, or bleeding. Procedure notes reviewed in detail   Plan LESI # 2 @ L4-5. Procedure discussed with patient. If patient is on blood thinners will need approval to hold yes- On Coumadin needs cardiology and medical clearance recent hospitalization for PE in August and being treated for this and DVT  At this time Continue Norco 5/325 QID prn- filled 9/7/2022  Continue Lyrica from pcp   Was not able to have EMG d/t leg swelling           Return for LESI # 2 @ L4-5. , follow up after procedure.

## 2023-01-30 NOTE — H&P
[facility]  History and Physical Update    Pt Name: Manan Quintero  MRN: [id number]  YOB: 1970  Date of evaluation: 1/30/2023      I have examined the patient and reviewed the H&P/Consult and there are no changes to the patient or plans.         Electronically signed by Chantal Scheuermann, MD on 1/30/2023 at 9:09 AM

## 2023-01-30 NOTE — OP NOTE
Pre-Procedure Note    Patient Name: Herson Branham   YOB: 1970  Medical Record Number: [id number]  Date: 1/30/23       Indication:  L-radiculitis    Consent: On file. Vital Signs:   Vitals:    01/30/23 0912   BP: (!) 113/56   Pulse: 88   Resp: 16   Temp: 98.4 °F (36.9 °C)   SpO2: 93%       Past Medical History:   has a past medical history of Ankylosing spondylitis ([address] Aortic stenosis, mild to moderate, Atrial fibrillation ([address] BPH (benign prostatic hyperplasia), Carpal tunnel syndrome on right, Chronic gout, COVID-19, DM2 (diabetes mellitus, type 2) ([address] DVT (deep venous thrombosis) ([address] Dyslipidemia, GERD (gastroesophageal reflux disease), Heart failure with preserved ejection fraction ([address] History of alcohol abuse, History of osteomyelitis, Hypertension, essential, Hypogonadism, male, Major depression, Mood disorder ([address] Morbid obesity ([address] DURAN (nonalcoholic steatohepatitis), KIARA treated with BiPAP, Pulmonary emboli ([address] and Vitamin D deficiency. Past Surgical History:   has a past surgical history that includes tracheostomy; Foot surgery (Right); hip surgery (Left, 6/29/2020); sigmoidoscopy (N/A, 10/26/2020); Upper gastrointestinal endoscopy (N/A, 11/14/2020); Colonoscopy (N/A, 11/15/2020); Tonsillectomy; Upper gastrointestinal endoscopy (N/A, 12/27/2021); Endoscopy, colon, diagnostic; Pain management procedure (Bilateral, 4/11/2022); and Pain management procedure (N/A, 5/23/2022). Pre-Sedation Documentation and Exam:   Vital signs have been reviewed (see flow sheet for vitals). Sedation/Anesthesia Plan:   MAC    Medications Planned:   Per Anesthesia. Patient is an appropriate candidate for plan of sedation: yes      Preoperative Diagnosis:  L-radiculitis, L-spinal stenosis     Post-Op Dx: as above     Procedure Performed:  Lumbar epidural steroid injection under fluoroscopy guidance      Indication for the Procedure:   The patient failed conservative management  for pain in the low back radiating to lower extremities. The patient is undergoing lumbar epidural steroid injection. .  As the patient is not responding to conservative management and it is interfering with activities of daily living we decided to proceed with lumbar epidural steroid injection. The procedure and risks were discussed with the patient and an informed consent was obtained     Procedure:   Used 18g  epidural needle     The patient is placed in prone position. Skin over the back was prepped and draped in sterile manner. Then using fluoroscopy the L3 interspace was observed and the skin and deep tissues in the  paramedian area were infiltrated with 10 ml of 1% lidocaine. The #18-gauge, 5-1/2 inch Tuohy needle was inserted through the skin wheal and the epidural space was identified using loss of resistance technique . This was confirmed with AP and lateral views using fluoroscopy after injecting about 0.25 ml of Omnipaque-180 and observing the spread of the contrast in the epidural space. Then after negative aspiration a total of 12 mg of dexamethasone with 4 ml of normal saline was injected into the epidural space. The needle is removed and a Band-Aid was placed over the needle insertion site. No paresthesia. EBL-0  Patient's vital signs remained stable and the patient tolerated the procedure well. The patient will be discharged home in stable condition and will be followed in the office in the next few weeks for further planning.            Electronically signed by Jennie Guadarrama MD on 1/30/23 at 10:34 AM EST

## 2023-01-30 NOTE — ANESTHESIA POSTPROCEDURE EVALUATION
Department of Anesthesiology  Postprocedure Note    Patient: Manan Quintero  MRN: [id number]  YOB: 1970  Date of evaluation: 1/30/2023      Procedure Summary     Date: 01/30/23 Room / Location: [facility] / [facility]    Anesthesia Start: 9464 Anesthesia Stop: 7351    Procedure: Lumbar Epidural Steroid Injection Lumbar 3 Diagnosis:       Lumbosacral radiculitis      (Lumbosacral radiculitis)    Surgeons: Chantal Scheuermann, MD Responsible Provider: Socorro Horvath MD    Anesthesia Type: MAC ASA Status: 4          Anesthesia Type: No value filed.     Atilio Phase I:      Atilio Phase II:        Anesthesia Post Evaluation    Patient location during evaluation: bedside  Patient participation: complete - patient participated  Level of consciousness: sleepy but conscious  Airway patency: patent  Nausea & Vomiting: no nausea and no vomiting  Complications: no  Cardiovascular status: hemodynamically stable  Respiratory status: acceptable and room air  Hydration status: stable

## 2023-01-30 NOTE — ANESTHESIA PRE PROCEDURE
Department of Anesthesiology  Preprocedure Note       Name:  Yaz Chavez   Age:  45 y.o.  :  1970                                          MRN:  [id number]         Date:  2023      Surgeon: Fareed Traore):  Kendra Martins MD    Procedure: Procedure(s):  Lumbar Epidural Steroid Injection Lumbar 4-5    Medications prior to admission:   Prior to Admission medications    Medication Sig Start Date End Date Taking? Authorizing Provider   furosemide (LASIX) 20 MG tablet TAKE 1 TABLET BY MOUTH DAILY 23   Marshall Harding MD   amLODIPine (NORVASC) 10 MG tablet TAKE 1 TABLET BY MOUTH DAILY 23   Marshall Harding MD   ammonium lactate (LAC-HYDRIN) 12 % lotion Apply topically as needed to legs. 23   SANDRA Canchola CNP   warfarin (COUMADIN) 5 MG tablet Take as directed by [facility] (#35 ~ 30 day supply) 23   Reji Hull MD   enoxaparin (LOVENOX) 150 MG/ML SOSY injection Inject 1 mL into the skin in the morning and 1 mL in the evening. 23   Reji Hull MD   potassium chloride (KLOR-CON M10) 10 MEQ extended release tablet Take 1 tablet by mouth 2 times daily 23   Marshall Harding MD   oxyCODONE-acetaminophen (PERCOCET) 5-325 MG per tablet Take 1 tablet by mouth 2 times daily as needed for Pain. Historical Provider, MD   hydrALAZINE (APRESOLINE) 50 MG tablet Take 1 tablet by mouth 3 times daily 10/21/22   Marshall Harding MD   escitalopram (LEXAPRO) 20 MG tablet Take 20 mg by mouth daily    Historical Provider, MD   vitamin E 400 UNIT capsule Take 400 Units by mouth daily    Historical Provider, MD   Vitamin A 3 MG (88853 UT) TABS Take 3 mg by mouth daily    Historical Provider, MD   pregabalin (LYRICA) 75 MG capsule Take 1 capsule by mouth 3 times daily for 30 days.  22  Cris Young DO   allopurinol (ZYLOPRIM) 300 MG tablet Take 1 tablet by mouth daily 22   SANDRA Lua - CNP   vitamin C (ASCORBIC ACID) 500 MG tablet Take 2 tablets by mouth daily 9/15/20   Naya Gums, APRN - CNP   CHOLECALCIFEROL PO Take 2,000 Units by mouth daily    Historical Provider, MD   atorvastatin (LIPITOR) 40 MG tablet Take 40 mg by mouth nightly    Historical Provider, MD   acetaminophen (TYLENOL) 325 MG tablet Take 650 mg by mouth every 4 hours as needed for Pain    Historical Provider, MD   busPIRone (BUSPAR) 10 MG tablet Take 10 mg by mouth 2 times daily     Historical Provider, MD   Multiple Vitamins-Minerals (THERAPEUTIC MULTIVITAMIN-MINERALS) tablet Take 1 tablet by mouth daily    Historical Provider, MD   Probiotic Product (SOBIA-BID PROBIOTIC PO) Take 1 tablet by mouth daily     Historical Provider, MD   sitaGLIPtan-metFORMIN (JANUMET)  MG per tablet Take 1 tablet by mouth 2 times daily (with meals)     Historical Provider, MD   ARIPiprazole (ABILIFY PO) Take 15 mg by mouth daily     Historical Provider, MD   Blood Glucose Monitoring Suppl (FREESTYLE LITE) ARTIE Patient needs all supplies for qd testing. DX: 250.00 8/18/11   Talisha Marks MD       Current medications:    No current outpatient medications on file. No current facility-administered medications for this visit. Allergies: Allergies   Allergen Reactions    Penicillins      Tolerated Zosyn 9/24/2020       Problem List:    Patient Active Problem List   Diagnosis Code    History of Parox atrial fibrillation Z86.79    Atrial fibrillation (HCC) I48.91    BPH (benign prostatic hyperplasia) N40.0    Carpal tunnel syndrome on right G56.01    Chronic gout M1A. 9XX0    DM2 (diabetes mellitus, type 2) ([facility] E11.9    Heart failure with preserved ejection fraction (HCC) I50.30    History of alcohol abuse F10.11    History of osteomyelitis Z87.39    Hypogonadism, male E29.1    Major depression F32.9    Morbid obesity (HCC) E66.01    DURAN (nonalcoholic steatohepatitis) K75.81    KIARA treated with BiPAP G47.33    Vitamin D deficiency E55.9    Physical deconditioning R53.81    Mood disorder (HCC) F39    GERD (gastroesophageal reflux disease) K21.9    Primary osteoarthritis of left hip M16.12    Hypertension, essential I10    Dyslipidemia E78.5    Aortic stenosis, mild to moderate I35.0    Perirectal abscess K61.1    Elevated alkaline phosphatase level R74.8    Hx of Chest pain, atypical R07.89    Lumbar spondylosis M47.816    Lumbar facet arthropathy M47.816    Lumbar radiculitis M54.16    Spinal stenosis of lumbar region with neurogenic claudication M48.062    Ankylosing spondylitis (HCC) M45.9    Mid back pain M54.9    Pulmonary embolism (HCC) I26.99    Anticoagulated on Coumadin Z79.01    Encounter for therapeutic drug monitoring Z51.81    Pre-op evaluation for epidural; injection Z01.818    Bilateral leg edema +1 to +2  R60.0    Lymphedema I89.0    Chronic venous stasis dermatitis of both lower extremities I87.2       Past Medical History:        Diagnosis Date    Ankylosing spondylitis (HCC)     Aortic stenosis, mild to moderate     Atrial fibrillation (HCC)     BPH (benign prostatic hyperplasia)     Carpal tunnel syndrome on right     rt hand    Chronic gout     COVID-19 09/2020    DM2 (diabetes mellitus, type 2) ([address]     DVT (deep venous thrombosis) (HCC)     Dyslipidemia     GERD (gastroesophageal reflux disease)     Heart failure with preserved ejection fraction (HCC)     History of alcohol abuse     History of osteomyelitis     Hx of R foot wound, osteomyelitis July 2018 requiring surgery and IV Vanco    Hypertension, essential     Hypogonadism, male     Major depression     Mood disorder ([address]     Morbid obesity ([address]     DURAN (nonalcoholic steatohepatitis)     KIARA treated with BiPAP     Pulmonary emboli ([address]     Vitamin D deficiency        Past Surgical History:        Procedure Laterality Date    COLONOSCOPY N/A 11/15/2020    COLONOSCOPY DIAGNOSTIC performed by Renate Rao MD at [facility]    ENDOSCOPY, COLON, DIAGNOSTIC      FOOT SURGERY Right     2018, R foot osteomyelitis    HIP SURGERY Left 6/29/2020    bilateral hip steroid injection, Left HIP FIRST performed by Sharad Lema MD at [facility] Bilateral 4/11/2022    bilateral L-facet MBB # 1 @ L4-5 and L5-S1 performed by Sharad Lema MD at [facility] N/A 5/23/2022    LESI  @ L5. performed by Sharad Lema MD at [address] [facility] N/A 10/26/2020    SIGMOIDOSCOPY DIAGNOSTIC FLEXIBLE performed by Levy Rubalcava MD at [facility]      as a baby    UPPER GASTROINTESTINAL ENDOSCOPY N/A 11/14/2020    EGD DIAGNOSTIC ONLY performed by Ambrose Monsalve MD at [place] [address] N/A 12/27/2021    [facility]       Social History:    Social History     Tobacco Use    Smoking status: Never    Smokeless tobacco: Never   Substance Use Topics    Alcohol use: Not Currently     Comment: hx of abuse                                Counseling given: Not Answered      Vital Signs (Current): There were no vitals filed for this visit.                                            BP Readings from Last 3 Encounters:   01/30/23 (!) 113/56   01/27/23 (!) 143/86   01/19/23 (!) 147/79       NPO Status:                                                                                 BMI:   Wt Readings from Last 3 Encounters:   01/30/23 (!) 302 lb (137 kg)   01/19/23 (!) 302 lb 3.2 oz (137.1 kg)   01/19/23 (!) 302 lb 3.2 oz (137.1 kg)     There is no height or weight on file to calculate BMI.    CBC:   Lab Results   Component Value Date/Time    WBC 8.9 01/19/2023 01:33 PM    RBC 5.15 01/19/2023 01:33 PM    RBC 4.55 04/15/2012 11:53 PM    HGB 14.9 01/19/2023 01:33 PM    HCT 46.1 01/19/2023 01:33 PM    MCV 90 01/19/2023 01:33 PM    RDW 15.5 01/19/2023 01:33 PM     01/19/2023 01:33 PM       CMP:   Lab Results   Component Value Date/Time     01/19/2023 01:33 PM     09/12/2022 04:42 PM    K 4.2 01/19/2023 01:33 PM    K 4.0 09/12/2022 04:42 PM    K 4.2 03/10/2022 08:35 PM     09/12/2022 04:42 PM    CO2 26 09/12/2022 04:42 PM    BUN 15 09/12/2022 04:42 PM    CREATININE 1.1 01/19/2023 01:33 PM    CREATININE 1.0 09/12/2022 04:42 PM    GFRAA >60 01/23/2019 07:12 PM    AGRATIO 0.8 06/24/2018 12:11 PM    LABGLOM >60 01/19/2023 01:33 PM    GLUCOSE 247 09/12/2022 04:42 PM    GLUCOSE 113 06/24/2018 12:11 PM    PROT 7.3 01/19/2023 01:33 PM    CALCIUM 9.7 09/12/2022 04:42 PM    BILITOT 0.4 01/19/2023 01:33 PM    ALKPHOS 151 01/19/2023 01:33 PM    AST 39 01/19/2023 01:33 PM    ALT 37 01/19/2023 01:33 PM       POC Tests:   Recent Labs     01/30/23  0918   POCGLU 166*       Coags:   Lab Results   Component Value Date/Time    PROTIME 17.0 09/15/2022 02:53 PM    INR 1.20 01/30/2023 09:09 AM    INR 6.30 11/08/2022 10:25 AM    APTT 52.8 09/12/2022 04:42 PM       HCG (If Applicable): No results found for: PREGTESTUR, PREGSERUM, HCG, HCGQUANT     ABGs: No results found for: PHART, PO2ART, SZL2BTP, PUW5GAZ, BEART, E0ZDFPVU     Type & Screen (If Applicable):  Lab Results   Component Value Date    LABRH POS 11/07/2020       Drug/Infectious Status (If Applicable):  Lab Results   Component Value Date/Time    HEPCAB Negative 10/15/2019 11:40 AM       COVID-19 Screening (If Applicable):   Lab Results   Component Value Date/Time    COVID19 NOT  DETECTED 09/12/2022 04:45 PM    COVID19 NOT DETECTED 11/05/2020 10:31 AM           Anesthesia Evaluation  Patient summary reviewed no history of anesthetic complications:   Airway: Mallampati: III  TM distance: >3 FB   Neck ROM: full  Mouth opening: > = 3 FB   Dental:          Pulmonary:normal exam  breath sounds clear to auscultation  (+) sleep apnea: on CPAP,                            Cardiovascular:  Exercise tolerance: good (>4 METS),   (+) hypertension:, valvular problems/murmurs: AS, murmur: Grade 2, Aortic,       ECG reviewed  Rhythm: regular  Rate: normal                    Neuro/Psych:   (+) neuromuscular disease:, psychiatric history:            GI/Hepatic/Renal:   (+) GERD:, liver disease:, morbid obesity          Endo/Other:    (+) DiabetesType II DM, poorly controlled, , .          Pt had no PAT visit       Abdominal:   (+) obese,           Vascular: negative vascular ROS.         Other Findings:             Anesthesia Plan      MAC     ASA 4       Induction: intravenous.    MIPS: prophylactic pharmacologic antiemetic agents not administered perioperatively for documented reasons.  Anesthetic plan and risks discussed with patient.      Plan discussed with CRNA and surgical team.                    JESUSITA ODEN MD   1/30/2023

## 2023-02-06 ENCOUNTER — APPOINTMENT (OUTPATIENT)
Dept: PHARMACY | Age: 53
End: 2023-02-06
Payer: MEDICARE

## 2023-02-08 ENCOUNTER — HOSPITAL ENCOUNTER (OUTPATIENT)
Dept: PHARMACY | Age: 53
Setting detail: THERAPIES SERIES
Discharge: HOME | End: 2023-02-08
Payer: MEDICARE

## 2023-02-08 DIAGNOSIS — I48.91 ATRIAL FIBRILLATION, UNSPECIFIED TYPE (HCC): Primary | ICD-10-CM

## 2023-02-08 DIAGNOSIS — I26.99 PULMONARY EMBOLISM, UNSPECIFIED CHRONICITY, UNSPECIFIED PULMONARY EMBOLISM TYPE, UNSPECIFIED WHETHER ACUTE COR PULMONALE PRESENT (HCC): ICD-10-CM

## 2023-02-08 DIAGNOSIS — Z51.81 ENCOUNTER FOR THERAPEUTIC DRUG MONITORING: ICD-10-CM

## 2023-02-08 DIAGNOSIS — Z79.01 ANTICOAGULATED ON COUMADIN: ICD-10-CM

## 2023-02-08 LAB — POC INR: 1.4 (ref 0.8–1.2)

## 2023-02-08 PROCEDURE — 36416 COLLJ CAPILLARY BLOOD SPEC: CPT

## 2023-02-08 PROCEDURE — 85610 PROTHROMBIN TIME: CPT

## 2023-02-08 PROCEDURE — 99212 OFFICE O/P EST SF 10 MIN: CPT

## 2023-02-08 NOTE — PROGRESS NOTES
Medication Management [address]   [phone] (phone)  [phone] (fax)    Mr. Adela Wong is a 45 y.o.  male with history of PE, Afib who presents today for anticoagulation monitoring and adjustment. Patient verifies current dosing regimen and tablet strength. Questionable if patient took dose yesterday, says he thinks he may have forgotten. Patient did not restart Lovenox after procedure as instructed. Patient says he was confused over when to restart so he did not restart following procedure. Patient denies s/s bleeding/bruising/swelling/SOB/chest pain. No blood in urine or stool. Slightly decreased appetite overall   No changes in medication/OTC agents/Herbals. No change in alcohol use or tobacco use. No change in activity level. Patient denies headaches/dizziness/lightheadedness/falls. No vomiting/diarrhea or acute illness. No Procedures scheduled in the future at this time. Assessment:   Lab Results   Component Value Date    INR 1.40 (H) 02/08/2023    INR 1.20 01/30/2023    INR 2.10 (H) 01/24/2023     INR subtherapeutic   Recent Labs     02/08/23  1328   INR 1.40*     INR Goal 2.0-3.0    Plan: Will have patient restart Lovenox at same dose previously, boost with Coumadin 10mg x1 today, 7.5mg x1 tomorrow, then continue Coumadin 5mg daily. Recheck INR in 5 days. Patient reminded to call the Anticoagulation Clinic with any signs or symptoms of bleeding or with any medication changes. Patient given instructions utilizing the teach back method. After visit summary printed and reviewed with patient. Discharged via transport chair in no apparent distress. For Pharmacy Admin Tracking Only    Intervention Detail: Dose Adjustment: 1, reason: Therapy Optimization  Total # of Interventions Recommended: 1  Total # of Interventions Accepted: 1  Time Spent (min): 20    Bennie Tolbert, PharmD  PGY2 Ambulatory Care Pharmacy Resident  2/8/2023 3:09 PM

## 2023-02-13 ENCOUNTER — APPOINTMENT (OUTPATIENT)
Dept: CT IMAGING | Age: 53
End: 2023-02-13
Payer: MEDICARE

## 2023-02-13 ENCOUNTER — HOSPITAL ENCOUNTER (OUTPATIENT)
Dept: PHARMACY | Age: 53
Setting detail: THERAPIES SERIES
Discharge: HOME | End: 2023-02-13
Payer: MEDICARE

## 2023-02-13 ENCOUNTER — HOSPITAL ENCOUNTER (EMERGENCY)
Age: 53
Discharge: HOME | End: 2023-02-13
Attending: EMERGENCY MEDICINE
Payer: MEDICARE

## 2023-02-13 VITALS
DIASTOLIC BLOOD PRESSURE: 96 MMHG | HEIGHT: 68 IN | RESPIRATION RATE: 18 BRPM | BODY MASS INDEX: 43.95 KG/M2 | TEMPERATURE: 98.2 F | HEART RATE: 71 BPM | OXYGEN SATURATION: 96 % | SYSTOLIC BLOOD PRESSURE: 144 MMHG | WEIGHT: 290 LBS

## 2023-02-13 DIAGNOSIS — I26.99 ACUTE PULMONARY EMBOLISM, UNSPECIFIED PULMONARY EMBOLISM TYPE, UNSPECIFIED WHETHER ACUTE COR PULMONALE PRESENT (HCC): ICD-10-CM

## 2023-02-13 DIAGNOSIS — Z79.01 ANTICOAGULATED ON COUMADIN: ICD-10-CM

## 2023-02-13 DIAGNOSIS — Z51.81 ENCOUNTER FOR THERAPEUTIC DRUG MONITORING: ICD-10-CM

## 2023-02-13 DIAGNOSIS — R06.02 SHORTNESS OF BREATH: Primary | ICD-10-CM

## 2023-02-13 DIAGNOSIS — I48.91 ATRIAL FIBRILLATION, UNSPECIFIED TYPE (HCC): Primary | ICD-10-CM

## 2023-02-13 LAB
ANION GAP SERPL CALC-SCNC: 10 MEQ/L (ref 8–16)
BASOPHILS ABSOLUTE: 0.1 THOU/MM3 (ref 0–0.1)
BASOPHILS NFR BLD AUTO: 0.5 %
BUN SERPL-MCNC: 20 MG/DL (ref 7–22)
CALCIUM SERPL-MCNC: 9.5 MG/DL (ref 8.5–10.5)
CHLORIDE SERPL-SCNC: 101 MEQ/L (ref 98–111)
CO2 SERPL-SCNC: 25 MEQ/L (ref 23–33)
CREAT SERPL-MCNC: 1.3 MG/DL (ref 0.4–1.2)
DEPRECATED RDW RBC AUTO: 50.9 FL (ref 35–45)
EOSINOPHIL NFR BLD AUTO: 3.2 %
EOSINOPHILS ABSOLUTE: 0.3 THOU/MM3 (ref 0–0.4)
ERYTHROCYTE [DISTWIDTH] IN BLOOD BY AUTOMATED COUNT: 15.6 % (ref 11.5–14.5)
GFR SERPL CREATININE-BSD FRML MDRD: > 60 ML/MIN/1.73M2
GLUCOSE SERPL-MCNC: 254 MG/DL (ref 70–108)
HCT VFR BLD AUTO: 45.6 % (ref 42–52)
HGB BLD-MCNC: 15.1 GM/DL (ref 14–18)
IMM GRANULOCYTES # BLD AUTO: 0.07 THOU/MM3 (ref 0–0.07)
IMM GRANULOCYTES NFR BLD AUTO: 0.7 %
INR PPP: 2.17 (ref 0.85–1.13)
LYMPHOCYTES ABSOLUTE: 1.6 THOU/MM3 (ref 1–4.8)
LYMPHOCYTES NFR BLD AUTO: 14.5 %
MCH RBC QN AUTO: 29.8 PG (ref 26–33)
MCHC RBC AUTO-ENTMCNC: 33.1 GM/DL (ref 32.2–35.5)
MCV RBC AUTO: 89.9 FL (ref 80–94)
MONOCYTES ABSOLUTE: 0.7 THOU/MM3 (ref 0.4–1.3)
MONOCYTES NFR BLD AUTO: 6.2 %
NEUTROPHILS NFR BLD AUTO: 74.9 %
NRBC BLD AUTO-RTO: 0 /100 WBC
OSMOLALITY SERPL CALC.SUM OF ELEC: 283.2 MOSMOL/KG (ref 275–300)
PLATELET # BLD AUTO: 255 THOU/MM3 (ref 130–400)
PMV BLD AUTO: 11.2 FL (ref 9.4–12.4)
POC INR: 2.1 (ref 0.8–1.2)
POTASSIUM SERPL-SCNC: 5.5 MEQ/L (ref 3.5–5.2)
RBC # BLD AUTO: 5.07 MILL/MM3 (ref 4.7–6.1)
SEGMENTED NEUTROPHILS ABSOLUTE COUNT: 8 THOU/MM3 (ref 1.8–7.7)
SODIUM SERPL-SCNC: 136 MEQ/L (ref 135–145)
TROPONIN T: < 0.01 NG/ML
WBC # BLD AUTO: 10.7 THOU/MM3 (ref 4.8–10.8)

## 2023-02-13 PROCEDURE — 99211 OFF/OP EST MAY X REQ PHY/QHP: CPT

## 2023-02-13 PROCEDURE — 84484 ASSAY OF TROPONIN QUANT: CPT

## 2023-02-13 PROCEDURE — 85025 COMPLETE CBC W/AUTO DIFF WBC: CPT

## 2023-02-13 PROCEDURE — 6360000004 HC RX CONTRAST MEDICATION: Performed by: EMERGENCY MEDICINE

## 2023-02-13 PROCEDURE — 85610 PROTHROMBIN TIME: CPT

## 2023-02-13 PROCEDURE — 80048 BASIC METABOLIC PNL TOTAL CA: CPT

## 2023-02-13 PROCEDURE — 93005 ELECTROCARDIOGRAM TRACING: CPT | Performed by: EMERGENCY MEDICINE

## 2023-02-13 PROCEDURE — 93010 ELECTROCARDIOGRAM REPORT: CPT | Performed by: INTERNAL MEDICINE

## 2023-02-13 PROCEDURE — 99285 EMERGENCY DEPT VISIT HI MDM: CPT

## 2023-02-13 PROCEDURE — 36415 COLL VENOUS BLD VENIPUNCTURE: CPT

## 2023-02-13 PROCEDURE — 71275 CT ANGIOGRAPHY CHEST: CPT

## 2023-02-13 PROCEDURE — 36416 COLLJ CAPILLARY BLOOD SPEC: CPT

## 2023-02-13 RX ADMIN — IOPAMIDOL 80 ML: 755 INJECTION, SOLUTION INTRAVENOUS at 13:47

## 2023-02-13 ASSESSMENT — PAIN - FUNCTIONAL ASSESSMENT: PAIN_FUNCTIONAL_ASSESSMENT: NONE - DENIES PAIN

## 2023-02-13 NOTE — ED PROVIDER NOTES
[facility] [address] [address] EMERGENCY DEPT      EMERGENCY MEDICINE     Pt Name: Lyla Pryor  MRN: [id number]  [place] 1970  Date of evaluation: 2/13/2023  Resident Physician: Marni King DPM  Supervising Physician: Tony Badillo MD    CHIEF COMPLAINT       Chief Complaint   Patient presents with    Shortness of Breath     HISTORY OF PRESENT ILLNESS   Lyla Pryor is a pleasant 45 y.o. male who presents to the emergency department from from home, as a walk in to the ED lobby for evaluation of shortness of breath. Patient relates that 2 years previously contracted COVID-19 and developed blood clots in his left foot and leg. These blood clots then traveled to his lungs. He was treated at that time and placed on oral Coumadin and follows in the Coumadin clinic. Patient relates that he is chronically short of breath, but over the past few days it has increased in severity. Relates that he cannot walk very far before he gets \"huffing and puffing. \"  He denies any acute chest pains, no nausea, no vomiting, no diarrhea, no fevers, no chills. He also relates that he has developed thrombophlebitis in his left leg and gets massage therapy to help.     PASTMEDICAL HISTORY     Past Medical History:   Diagnosis Date    Ankylosing spondylitis ([address]     Aortic stenosis, mild to moderate     Atrial fibrillation (HCC)     BPH (benign prostatic hyperplasia)     Carpal tunnel syndrome on right     rt hand    Chronic gout     COVID-19 09/2020    DM2 (diabetes mellitus, type 2) ([address]     DVT (deep venous thrombosis) (HCC)     Dyslipidemia     GERD (gastroesophageal reflux disease)     Heart failure with preserved ejection fraction ([address]     History of alcohol abuse     History of osteomyelitis     Hx of R foot wound, osteomyelitis July 2018 requiring surgery and IV Vanco    Hypertension, essential     Hypogonadism, male     Major depression     Mood disorder ([address]     Morbid obesity ([address]     DURAN (nonalcoholic steatohepatitis)     KIARA treated with BiPAP     Pulmonary emboli ([facility])     Vitamin D deficiency        Patient Active Problem List   Diagnosis Code    History of Parox atrial fibrillation Z86.79    Atrial fibrillation ([facility]) I48.91    BPH (benign prostatic hyperplasia) N40.0    Carpal tunnel syndrome on right G56.01    Chronic gout M1A. 9XX0    DM2 (diabetes mellitus, type 2) ([facility]) E11.9    Heart failure with preserved ejection fraction ([facility]) I50.30    History of alcohol abuse F10.11    History of osteomyelitis Z87.39    Hypogonadism, male E29.1    Major depression F32.9    Morbid obesity ([facility]) E66.01    DURAN (nonalcoholic steatohepatitis) K75.81    KIARA treated with BiPAP G47.33    Vitamin D deficiency E55.9    Physical deconditioning R53.81    Mood disorder ([facility]) F39    GERD (gastroesophageal reflux disease) K21.9    Primary osteoarthritis of left hip M16.12    Hypertension, essential I10    Dyslipidemia E78.5    Aortic stenosis, mild to moderate I35.0    Perirectal abscess K61.1    Elevated alkaline phosphatase level R74.8    Hx of Chest pain, atypical R07.89    Lumbar spondylosis M47.816    Lumbar facet arthropathy M47.816    Lumbar radiculitis M54.16    Spinal stenosis of lumbar region with neurogenic claudication M48.062    Ankylosing spondylitis ([facility]) M45.9    Mid back pain M54.9    Pulmonary embolism ([facility]) I26.99    Anticoagulated on Coumadin Z79.01    Encounter for therapeutic drug monitoring Z51.81    Pre-op evaluation for epidural; injection Z01.818    Bilateral leg edema +1 to +2  R60.0    Lymphedema I89.0    Chronic venous stasis dermatitis of both lower extremities I87.2     SURGICAL HISTORY       Past Surgical History:   Procedure Laterality Date    COLONOSCOPY N/A 11/15/2020    COLONOSCOPY DIAGNOSTIC performed by Jacob Craft MD at [facility]    ENDOSCOPY, COLON, DIAGNOSTIC      FOOT SURGERY Right     2018, R foot osteomyelitis    HIP SURGERY Left 6/29/2020    bilateral hip steroid injection, Left HIP FIRST performed by Ashley Morrissey MD at [facility] Bilateral 4/11/2022    bilateral L-facet MBB # 1 @ L4-5 and L5-S1 performed by Ashley Morrissey MD at [facility] N/A 5/23/2022    LESI  @ L5. performed by Ashley Morrissey MD at [facility] N/A 1/30/2023    Lumbar Epidural Steroid Injection Lumbar 3 performed by Ashley Morrissey MD at [address] [place] [address] N/A 10/26/2020    SIGMOIDOSCOPY DIAGNOSTIC FLEXIBLE performed by Evelia Pryor MD at [address] [place] [address]      as a baby    UPPER GASTROINTESTINAL ENDOSCOPY N/A 11/14/2020    EGD DIAGNOSTIC ONLY performed by Marsha Duffy MD at [place] [address] 12/27/2021    EGD performed by Marsha Duffy MD at [address] [place] [address]       Current Discharge Medication List        CONTINUE these medications which have NOT CHANGED    Details   ammonium lactate (LAC-HYDRIN) 12 % lotion Apply topically as needed to legs. Qty: 1 each, Refills: 1    Associated Diagnoses: Lymphedema of both lower extremities;  Venous insufficiency of both lower extremities      warfarin (COUMADIN) 5 MG tablet Take as directed by [facility] (#35 ~ 30 day supply)  Qty: 35 tablet, Refills: 1      furosemide (LASIX) 20 MG tablet TAKE 1 TABLET BY MOUTH DAILY  Qty: 30 tablet, Refills: 0    Comments: Maximum Refills Reached      amLODIPine (NORVASC) 10 MG tablet TAKE 1 TABLET BY MOUTH DAILY  Qty: 30 tablet, Refills: 0    Comments: Maximum Refills Reached      potassium chloride (KLOR-CON M10) 10 MEQ extended release tablet Take 1 tablet by mouth 2 times daily  Qty: 60 tablet, Refills: 3      oxyCODONE-acetaminophen (PERCOCET) 5-325 MG per tablet Take 1 tablet by mouth 2 times daily as needed for Pain.      hydrALAZINE (APRESOLINE) 50 MG tablet Take 1 tablet by mouth 3 times daily  Qty: 90 tablet, Refills: 3      escitalopram (LEXAPRO) 20 MG tablet Take 20 mg by mouth daily      vitamin E 400 UNIT capsule Take 400 Units by mouth daily      Vitamin A 3 MG (83420 UT) TABS Take 3 mg by mouth daily      pregabalin (LYRICA) 75 MG capsule Take 1 capsule by mouth 3 times daily for 30 days. Qty: 90 capsule, Refills: 0    Associated Diagnoses: Lumbar radiculitis; Mid back pain; Spinal stenosis of lumbar region with neurogenic claudication; Ankylosing spondylitis of multiple sites in spine (HCC)      allopurinol (ZYLOPRIM) 300 MG tablet Take 1 tablet by mouth daily  Qty: 90 tablet, Refills: 0    Associated Diagnoses: Chronic tophaceous gout; Medication monitoring encounter; H/O: gout      vitamin C (ASCORBIC ACID) 500 MG tablet Take 2 tablets by mouth daily  Qty: 30 tablet, Refills: 0      CHOLECALCIFEROL PO Take 2,000 Units by mouth daily      atorvastatin (LIPITOR) 40 MG tablet Take 40 mg by mouth nightly      acetaminophen (TYLENOL) 325 MG tablet Take 650 mg by mouth every 4 hours as needed for Pain      busPIRone (BUSPAR) 10 MG tablet Take 10 mg by mouth 2 times daily       Multiple Vitamins-Minerals (THERAPEUTIC MULTIVITAMIN-MINERALS) tablet Take 1 tablet by mouth daily      Probiotic Product (SOBIA-BID PROBIOTIC PO) Take 1 tablet by mouth daily       sitaGLIPtan-metFORMIN (JANUMET)  MG per tablet Take 1 tablet by mouth 2 times daily (with meals)       ARIPiprazole (ABILIFY PO) Take 15 mg by mouth daily     Associated Diagnoses: DDD (degenerative disc disease)      Blood Glucose Monitoring Suppl (FREESTYLE LITE) ARTIE Patient needs all supplies for qd testing. DX: 250.00  Qty: 1 Device, Refills: 11             ALLERGIES     is allergic to penicillins. FAMILY HISTORY     He indicated that his mother is . He indicated that his father is alive. He indicated that his brother is alive. He indicated that his daughter is alive.  He indicated that both of his sons are alive. He indicated that the status of his paternal aunt is unknown. SOCIAL HISTORY       Social History     Tobacco Use    Smoking status: Never    Smokeless tobacco: Never   Vaping Use    Vaping Use: Never used   Substance Use Topics    Alcohol use: Not Currently     Comment: hx of abuse    Drug use: No       PHYSICAL EXAM       ED Triage Vitals [02/13/23 1054]   BP Temp Temp Source Heart Rate Resp SpO2 Height Weight   (!) 157/89 98.2 °F (36.8 °C) Oral 78 20 95 % 5' 8\" (1.727 m) 290 lb (131.5 kg)       Additional Vital Signs:  Vitals:    02/13/23 1331   BP: (!) 144/96   Pulse: 71   Resp: 18   Temp:    SpO2: 96%     Physical Exam  Vitals and nursing note reviewed. Constitutional:       General: He is not in acute distress. Appearance: He is obese. HENT:      Head: Normocephalic. Mouth/Throat:      Mouth: Mucous membranes are moist.   Eyes:      Extraocular Movements: Extraocular movements intact. Cardiovascular:      Rate and Rhythm: Normal rate and regular rhythm. Pulses: Normal pulses. Heart sounds: Normal heart sounds. Pulmonary:      Effort: Pulmonary effort is normal. No respiratory distress. Breath sounds: Wheezing present. Chest:      Chest wall: No tenderness. Abdominal:      General: Bowel sounds are normal.      Tenderness: There is no abdominal tenderness. Musculoskeletal:      Cervical back: Normal range of motion. Right lower leg: Edema present. Left lower leg: Tenderness present. Edema present. Skin:     General: Skin is warm and dry. Capillary Refill: Capillary refill takes 2 to 3 seconds. Neurological:      General: No focal deficit present. Mental Status: He is alert and oriented to person, place, and time.    Psychiatric:         Mood and Affect: Mood normal.         Behavior: Behavior normal.       FORMAL DIAGNOSTIC RESULTS     RADIOLOGY: Interpretation per the Radiologist below, if available at the time of this note (none if blank):    CTA CHEST W WO CONTRAST   Final Result    No pulmonary emboli or pulmonary infiltrates. **This report has been created using voice recognition software. It may contain minor errors which are inherent in voice recognition technology. **      Final report electronically signed by Dr. Daiana Barry on 2/13/2023 2:03 PM          LABS: (none if blank)  Labs Reviewed   BASIC METABOLIC PANEL W/ REFLEX TO MG FOR LOW K - Abnormal; Notable for the following components:       Result Value    Potassium reflex Magnesium 5.5 (*)     Glucose 254 (*)     Creatinine 1.3 (*)     All other components within normal limits   CBC WITH AUTO DIFFERENTIAL - Abnormal; Notable for the following components:    RDW-CV 15.6 (*)     RDW-SD 50.9 (*)     Segs Absolute 8.0 (*)     All other components within normal limits   PROTIME-INR - Abnormal; Notable for the following components:    INR 2.17 (*)     All other components within normal limits   TROPONIN   ANION GAP   GLOMERULAR FILTRATION RATE, ESTIMATED   OSMOLALITY       (Any cultures that may have been sent were not resulted at the time of this patient visit)    [facility] / ED COURSE:     1) Number and Complexity of Problems            Problem List This Visit:         Chief Complaint   Patient presents with    Shortness of Breath            Differential Diagnosis includes (but not limited to):  Pulmonary embolism, URI, pneumonia, COVID        Diagnoses Considered but I have low suspicion of:   Acute MI, ACS             Pertinent Comorbid Conditions:     History of DVT, hypertension, pulmonary emboli    2)  Data Reviewed (none if left blank)          My Independent interpretations:     EKG:          Imaging: CTA chest    Labs:      INR, BMP, CBC, troponin.                    Decision Rules/Clinical Scores utilized:              External Documentation Reviewed:         Previous patient encounter documents & history available on EMR was reviewed             See Formal Diagnostic Results above for the lab and radiology tests and orders. 3)  Treatment and Disposition         ED Reassessment: Patient reassessed post CTA. CTA findings were described to patient. Patient was in no acute distress and stated he felt better after setting up. Case discussed with consulting clinician:           Shared Decision-Making was performed and disposition discussed with the        Patient/Family and questions answered          Social determinants of health impacting treatment or disposition:           Code Status: Full code      Summary of Patient Presentation:        Medical Decision Making  This is a 57-year-old male patient presenting to the emergency department with complaint of acute shortness of breath. Patient states that he had COVID approximately 2 years ago and developed blood clots in his legs and his lungs. They were treated at that time and he is currently on chronic warfarin, lab work-up showed INR of 2.1. Patient relates that shortness of breath is worse while walking for a moderate distance. CTA chest was obtained to rule out any acute pulmonary emboli, no acute findings were found. It was determined that patient's symptoms were more than likely occurring due to chronic shortness of breath, exacerbated from prior blood clots and pulmonary emboli. Patient was given instructions to take Tylenol for pain control, and to follow-up promptly with his PCP in 2-3 days. /  ED Course as of 02/13/23 1436   Mon Feb 13, 2023   1424 CTA chest returned with no findings of pulmonary embolism. [AA]      ED Course User Index  [AA] Mell Berman DPM     Vitals Reviewed:    Vitals:    02/13/23 1054 02/13/23 1331   BP: (!) 157/89 (!) 144/96   Pulse: 78 71   Resp: 20 18   Temp: 98.2 °F (36.8 °C)    TempSrc: Oral    SpO2: 95% 96%   Weight: 290 lb (131.5 kg)    Height: 5' 8\" (1.727 m)        The patient was seen and examined. Appropriate diagnostic testing was performed and results reviewed with the patient. The results of pertinent diagnostic studies and exam findings were discussed. The patients provisional diagnosis and plan of care were discussed with the patient and present family who expressed understanding. Any medications were reviewed and indications and risks of medications were discussed with the patient /family present. Strict verbal and written return precautions, instructions and appropriate follow-up provided to  the patient. ED Medications administered this visit:  (None if blank)  Medications   iopamidol (ISOVUE-370) 76 % injection 80 mL (80 mLs IntraVENous Given 2/13/23 1347)         PROCEDURES: (None if blank)  Procedures:     CRITICAL CARE: (None if blank)      DISCHARGE PRESCRIPTIONS: (None if blank)  Current Discharge Medication List          FINAL IMPRESSION      1. Shortness of breath          DISPOSITION/PLAN   DISPOSITION Decision To Discharge 02/13/2023 02:28:17 PM        OUTPATIENT FOLLOW UP THE PATIENT:  SANDRA La - CNP  [facility]  [phone]    Schedule an appointment as soon as possible for a visit in 2 days  For continued management of shortness of breath.     [facility] [address] [address] EMERGENCY DEPT  [facility] [address]  Go to   If symptoms worsen    YVES Arias [place]  Resident  02/13/23 2864

## 2023-02-13 NOTE — DISCHARGE INSTRUCTIONS
You were seen in the emergency department for shortness of breath. Recommend prompt follow-up in 2-3 days with primary care provider for continued management of shortness of breath. May take over-the-counter Tylenol (acetaminophen) for pain control.

## 2023-02-13 NOTE — PROGRESS NOTES
Medication Management [address]   [phone] (phone)  [phone] (fax)    Mr. Valeria Davis is a 45 y.o.  male with history of PE, Afib who presents today for anticoagulation monitoring and adjustment. Patient verifies current dosing regimen and tablet strength. No missed or extra doses. Patient denies s/s bleeding/bruising/swelling/chest pain  - Shortness of breath - asked if we could transport him down to emergency room so he can be evaluated. No blood in urine or stool. No dietary changes. No changes in medication/OTC agents/Herbals. No change in alcohol use or tobacco use. Change in activity level. - Been active lately  Patient denies headaches/dizziness/lightheadedness/falls. No vomiting/diarrhea or acute illness. No Procedures scheduled in the future at this time. Assessment:   Lab Results   Component Value Date    INR 2.10 (H) 02/13/2023    INR 1.40 (H) 02/08/2023    INR 1.20 01/30/2023     INR therapeutic   Recent Labs     02/13/23  1027   INR 2.10*     Plan:  Stop Lovenox injections at this time. Continue Coumadin 5 mg daily. Recheck INR in 1 week(s). Patient reminded to call the Anticoagulation Clinic with any signs or symptoms of bleeding or with any medication changes. Patient given instructions utilizing the teach back method. After visit summary printed and reviewed with patient. Transported to the emergency department via wheelchair for evaluation.     For Pharmacy Admin Tracking Only  Time Spent (min): [address], PharmJAYLEN, BCPS  2/13/2023  10:58 AM

## 2023-02-13 NOTE — ED TRIAGE NOTES
Pt presents to ED with c/o shortness of breath that has been going on for about three days. Pt states hx of blood clot in lung and currently taking warfarin.

## 2023-02-13 NOTE — ED PROVIDER NOTES
[facility] ATTENDING ATTESTATION      Evaluation of Sue Garcia. Case discussed and care plan developed with resident physician. I agree with the resident physician documentation and plan as documented by him, except if my documentation differs. Patient seen, interviewed and examined by me. I reviewed the medical, surgical, family and social history, medications and allergies. I have reviewed the nursing documentation. Brief H&P   Patient with history of long COVID, DVT, PE after COVID 19 2 years ago. States that today he had worsening shortness of breath, that is worse than at his baseline. Physical exam is notable for well appearing obese male, otherwise nonfocal exam.  Normal oxygen saturation here. Medical Decision Making   MDM:   Shortness of breath with concern for PE based on patient's history. Plan:   IV line, labs  Imaging  Observation in the ED while awaiting results    Please see the resident physician completed note for final disposition except as documented on this attestation. I have reviewed and interpreted all available lab, radiology and ekg results available at the moment. Diagnosis, treatment and disposition plans were discussed and agreed upon by patient. This transcription was electronically signed. It was dictated by use of voice recognition software and electronically transcribed. The transcription may contain errors not detected in proofreading.      I performed direct supervision and was present for the critical portion following procedures: None  Critical care time on this case: None    Electronically signed by Vikki Jimenez MD on 2/13/23 at 2:01 PM EST       Vikki Jimenez MD  02/13/23 1205

## 2023-02-16 PROBLEM — Z01.818 PRE-OP EVALUATION: Status: RESOLVED | Noted: 2023-01-17 | Resolved: 2023-02-16

## 2023-02-16 RX ORDER — AMLODIPINE BESYLATE 10 MG/1
10 TABLET ORAL DAILY
Qty: 30 TABLET | Refills: 0 | Status: SHIPPED | OUTPATIENT
Start: 2023-02-16

## 2023-02-17 LAB
EKG ATRIAL RATE: 79 BPM
EKG P AXIS: 46 DEGREES
EKG P-R INTERVAL: 164 MS
EKG Q-T INTERVAL: 378 MS
EKG QRS DURATION: 76 MS
EKG QTC CALCULATION (BAZETT): 433 MS
EKG R AXIS: -33 DEGREES
EKG T AXIS: 49 DEGREES
EKG VENTRICULAR RATE: 79 BPM

## 2023-02-21 ENCOUNTER — OFFICE VISIT (OUTPATIENT)
Dept: CARDIOLOGY CLINIC | Age: 53
End: 2023-02-21
Payer: MEDICARE

## 2023-02-21 ENCOUNTER — HOSPITAL ENCOUNTER (OUTPATIENT)
Dept: PHARMACY | Age: 53
Setting detail: THERAPIES SERIES
Discharge: HOME | End: 2023-02-21
Payer: MEDICARE

## 2023-02-21 ENCOUNTER — HOSPITAL ENCOUNTER (OUTPATIENT)
Age: 53
Discharge: HOME | End: 2023-02-21
Payer: MEDICARE

## 2023-02-21 ENCOUNTER — TRANSCRIBE ORDERS (OUTPATIENT)
Dept: ADMINISTRATIVE | Age: 53
End: 2023-02-21

## 2023-02-21 VITALS
DIASTOLIC BLOOD PRESSURE: 79 MMHG | WEIGHT: 300.6 LBS | BODY MASS INDEX: 45.56 KG/M2 | HEIGHT: 68 IN | SYSTOLIC BLOOD PRESSURE: 130 MMHG | HEART RATE: 67 BPM

## 2023-02-21 DIAGNOSIS — E66.01 MORBID OBESITY (HCC): Chronic | ICD-10-CM

## 2023-02-21 DIAGNOSIS — I89.0 LYMPHEDEMA: ICD-10-CM

## 2023-02-21 DIAGNOSIS — I48.91 ATRIAL FIBRILLATION, UNSPECIFIED TYPE (HCC): Primary | ICD-10-CM

## 2023-02-21 DIAGNOSIS — Z79.01 ANTICOAGULATED ON COUMADIN: ICD-10-CM

## 2023-02-21 DIAGNOSIS — I26.99 PULMONARY EMBOLISM, UNSPECIFIED CHRONICITY, UNSPECIFIED PULMONARY EMBOLISM TYPE, UNSPECIFIED WHETHER ACUTE COR PULMONALE PRESENT (HCC): ICD-10-CM

## 2023-02-21 DIAGNOSIS — I35.0 AORTIC STENOSIS, MILD: Chronic | ICD-10-CM

## 2023-02-21 DIAGNOSIS — I50.32 CHRONIC HEART FAILURE WITH PRESERVED EJECTION FRACTION (HCC): Primary | Chronic | ICD-10-CM

## 2023-02-21 DIAGNOSIS — Z86.79 HISTORY OF ATRIAL FIBRILLATION: ICD-10-CM

## 2023-02-21 DIAGNOSIS — R60.0 BILATERAL LEG EDEMA: ICD-10-CM

## 2023-02-21 DIAGNOSIS — Z51.81 ENCOUNTER FOR THERAPEUTIC DRUG MONITORING: ICD-10-CM

## 2023-02-21 DIAGNOSIS — R06.02 SHORTNESS OF BREATH: Primary | ICD-10-CM

## 2023-02-21 DIAGNOSIS — I10 HYPERTENSION, ESSENTIAL: Chronic | ICD-10-CM

## 2023-02-21 LAB
ANION GAP SERPL CALC-SCNC: 11 MEQ/L (ref 8–16)
BUN SERPL-MCNC: 23 MG/DL (ref 7–22)
CALCIUM SERPL-MCNC: 9.4 MG/DL (ref 8.5–10.5)
CHLORIDE SERPL-SCNC: 106 MEQ/L (ref 98–111)
CO2 SERPL-SCNC: 27 MEQ/L (ref 23–33)
CREAT SERPL-MCNC: 1.2 MG/DL (ref 0.4–1.2)
GFR SERPL CREATININE-BSD FRML MDRD: > 60 ML/MIN/1.73M2
GLUCOSE SERPL-MCNC: 95 MG/DL (ref 70–108)
POC INR: 1.4 (ref 0.8–1.2)
POTASSIUM SERPL-SCNC: 4.3 MEQ/L (ref 3.5–5.2)
SODIUM SERPL-SCNC: 144 MEQ/L (ref 135–145)

## 2023-02-21 PROCEDURE — 80048 BASIC METABOLIC PNL TOTAL CA: CPT

## 2023-02-21 PROCEDURE — G8417 CALC BMI ABV UP PARAM F/U: HCPCS | Performed by: INTERNAL MEDICINE

## 2023-02-21 PROCEDURE — 99212 OFFICE O/P EST SF 10 MIN: CPT | Performed by: PHARMACIST

## 2023-02-21 PROCEDURE — 36415 COLL VENOUS BLD VENIPUNCTURE: CPT

## 2023-02-21 PROCEDURE — 1036F TOBACCO NON-USER: CPT | Performed by: INTERNAL MEDICINE

## 2023-02-21 PROCEDURE — 85610 PROTHROMBIN TIME: CPT | Performed by: PHARMACIST

## 2023-02-21 PROCEDURE — 3017F COLORECTAL CA SCREEN DOC REV: CPT | Performed by: INTERNAL MEDICINE

## 2023-02-21 PROCEDURE — 99214 OFFICE O/P EST MOD 30 MIN: CPT | Performed by: INTERNAL MEDICINE

## 2023-02-21 PROCEDURE — 36416 COLLJ CAPILLARY BLOOD SPEC: CPT | Performed by: PHARMACIST

## 2023-02-21 PROCEDURE — 3074F SYST BP LT 130 MM HG: CPT | Performed by: INTERNAL MEDICINE

## 2023-02-21 PROCEDURE — G8484 FLU IMMUNIZE NO ADMIN: HCPCS | Performed by: INTERNAL MEDICINE

## 2023-02-21 PROCEDURE — 3078F DIAST BP <80 MM HG: CPT | Performed by: INTERNAL MEDICINE

## 2023-02-21 PROCEDURE — G8427 DOCREV CUR MEDS BY ELIG CLIN: HCPCS | Performed by: INTERNAL MEDICINE

## 2023-02-21 RX ORDER — POTASSIUM CHLORIDE 750 MG/1
10 TABLET, EXTENDED RELEASE ORAL DAILY
Qty: 90 TABLET | Refills: 1
Start: 2023-02-21 | End: 2023-02-22

## 2023-02-21 NOTE — PATIENT INSTRUCTIONS
TAKE POTASSIUM 1 TIME DAILY    You may receive a survey regarding the care you received during your visit. Your input is valuable to us. We encourage you to complete and return your survey. We hope you will choose us in the future for your healthcare needs.

## 2023-02-21 NOTE — PROGRESS NOTES
Medication Management [address]  [phone] (phone)  [phone] (fax)    Mr. Cheryl Crowley is a 45 y.o.  male with history of PE, Afib who presents today for anticoagulation monitoring and adjustment. Patient verifies current dosing regimen and tablet strength. No missed or extra doses. Patient denies s/s bleeding/bruising. Typical SOB. More swelling. Reports chest pain with any amount of walking, advised to contact primary care provider for this. No blood in urine or stool. No dietary changes. Still reporting minimal appetite  No changes in OTC agents/Herbals. Reports may be starting on inhaled steroid. No change in alcohol use or tobacco use. Reports less activity. Patient denies headaches/dizziness/lightheadedness/falls. No vomiting/diarrhea or acute illness. No Procedures scheduled in the future at this time. Reports significant stress from living situation. Assessment:   Lab Results   Component Value Date    INR 1.40 (H) 02/21/2023    INR 2.17 (H) 02/13/2023    INR 2.10 (H) 02/13/2023     INR subtherapeutic   Recent Labs     02/21/23  1349   INR 1.40*       INR goal 2.0-3.0    Reviewed and discussed patient and INR with pharmacist.   Meena Flynn, PharmD Candidate 2023    Plan:  Coumadin 7.5mg today and tomorrow, then continue Coumadin 5mg daily. Recheck INR in 1 week(s). Patient reminded to call the Anticoagulation Clinic with any signs or symptoms of bleeding or with any medication changes. Patient given instructions utilizing the teach back method. After visit summary printed and reviewed with patient. Discharged in [facility] in no apparent distress.     For Pharmacy Admin Tracking Only    Intervention Detail: Dose Adjustment: 1, reason: Therapy Optimization  Total # of Interventions Recommended: 1  Total # of Interventions Accepted: 1  Time Spent (min): 20

## 2023-02-22 RX ORDER — POTASSIUM CHLORIDE 750 MG/1
10 TABLET, EXTENDED RELEASE ORAL 2 TIMES DAILY
Qty: 180 TABLET | Refills: 1 | Status: SHIPPED | OUTPATIENT
Start: 2023-02-22

## 2023-02-28 ENCOUNTER — HOSPITAL ENCOUNTER (OUTPATIENT)
Dept: PULMONOLOGY | Age: 53
Discharge: HOME | End: 2023-02-28
Payer: MEDICARE

## 2023-02-28 ENCOUNTER — HOSPITAL ENCOUNTER (OUTPATIENT)
Dept: PHARMACY | Age: 53
Setting detail: THERAPIES SERIES
Discharge: HOME | End: 2023-02-28
Payer: MEDICARE

## 2023-02-28 DIAGNOSIS — Z51.81 ENCOUNTER FOR THERAPEUTIC DRUG MONITORING: ICD-10-CM

## 2023-02-28 DIAGNOSIS — R06.02 SHORTNESS OF BREATH: ICD-10-CM

## 2023-02-28 DIAGNOSIS — I26.99 PULMONARY EMBOLISM, UNSPECIFIED CHRONICITY, UNSPECIFIED PULMONARY EMBOLISM TYPE, UNSPECIFIED WHETHER ACUTE COR PULMONALE PRESENT (HCC): ICD-10-CM

## 2023-02-28 DIAGNOSIS — I48.91 ATRIAL FIBRILLATION, UNSPECIFIED TYPE (HCC): Primary | ICD-10-CM

## 2023-02-28 DIAGNOSIS — Z79.01 ANTICOAGULATED ON COUMADIN: ICD-10-CM

## 2023-02-28 LAB — POC INR: 1.7 (ref 0.8–1.2)

## 2023-02-28 PROCEDURE — 94726 PLETHYSMOGRAPHY LUNG VOLUMES: CPT

## 2023-02-28 PROCEDURE — 94010 BREATHING CAPACITY TEST: CPT

## 2023-02-28 PROCEDURE — 99212 OFFICE O/P EST SF 10 MIN: CPT | Performed by: PHARMACIST

## 2023-02-28 PROCEDURE — 94729 DIFFUSING CAPACITY: CPT

## 2023-02-28 PROCEDURE — 36416 COLLJ CAPILLARY BLOOD SPEC: CPT | Performed by: PHARMACIST

## 2023-02-28 NOTE — PROGRESS NOTES
Medication Management [address]  [phone] (phone)  [phone] (fax)    Mr. Ambar Puentes is a 45 y.o.  male with history of PE, Afib who presents today for anticoagulation monitoring and adjustment. Patient verifies current dosing regimen and tablet strength. No missed or extra doses. Patient denies s/s bleeding/bruising/swelling/SOB/chest pain. No blood in urine or stool. No dietary changes. No changes in medication/OTC agents/Herbals. No change in alcohol use or tobacco use. Little bit more activity. Patient denies headaches/dizziness/lightheadedness/falls. No vomiting/diarrhea or acute illness. No Procedures scheduled in the future at this time. Still low appetite. Assessment:     Lab Results   Component Value Date    INR 1.70 (H) 02/28/2023    INR 1.40 (H) 02/21/2023    INR 2.17 (H) 02/13/2023     INR subtherapeutic   Recent Labs     02/28/23  1032   INR 1.70*       INR goal 2.0-3.0    Reviewed and discussed patient and INR with pharmacist.   Yeison Noble, PharmD Candidate 2023    Plan:  Coumadin 7.5mg today and tomorrow, then increase Coumadin 7.5mg MF and 5mg TWThSaS. Recheck INR in 2 week(s). Patient reminded to call the Anticoagulation Clinic with signs or symptoms of bleeding or with any medication changes. Patient given instructions utilizing the teach back method. After visit summary printed and reviewed with patient. Discharged in [facility] in no apparent distress.     For Pharmacy Admin Tracking Only    Intervention Detail: Dose Adjustment: 1, reason: Therapy Optimization  Total # of Interventions Recommended: 1  Total # of Interventions Accepted: 1  Time Spent (min): 20

## 2023-02-28 NOTE — PROGRESS NOTES
Prescreening performed prior to testing.  The following symptoms may indicate COVID-19 infection:        One of the following criteria:   Temperature taken, patient temperature was 98.0 F.   Fever greater 100.0 F -no  New onset cough -  no  New onset shortness of breath -no  New onset difficulty breathing -no        And/or   Two or more of the following criteria:  New onset muscle aches -no  New onset headache -no  New onset sore throat -no  New onset loss of smell/taste -no  New onset diarrhea -no    Patient's screening was negative. PFT will be performed.

## 2023-03-15 ENCOUNTER — HOSPITAL ENCOUNTER (OUTPATIENT)
Dept: PHARMACY | Age: 53
Setting detail: THERAPIES SERIES
Discharge: HOME | End: 2023-03-15
Payer: MEDICARE

## 2023-03-15 ENCOUNTER — TELEPHONE (OUTPATIENT)
Dept: PULMONOLOGY | Age: 53
End: 2023-03-15

## 2023-03-15 DIAGNOSIS — I48.91 ATRIAL FIBRILLATION, UNSPECIFIED TYPE (HCC): Primary | ICD-10-CM

## 2023-03-15 DIAGNOSIS — Z51.81 ENCOUNTER FOR THERAPEUTIC DRUG MONITORING: ICD-10-CM

## 2023-03-15 DIAGNOSIS — Z79.01 ANTICOAGULATED ON COUMADIN: ICD-10-CM

## 2023-03-15 DIAGNOSIS — I26.99 PULMONARY EMBOLISM, UNSPECIFIED CHRONICITY, UNSPECIFIED PULMONARY EMBOLISM TYPE, UNSPECIFIED WHETHER ACUTE COR PULMONALE PRESENT (HCC): ICD-10-CM

## 2023-03-15 LAB — POC INR: 1.8 (ref 0.8–1.2)

## 2023-03-15 PROCEDURE — 36416 COLLJ CAPILLARY BLOOD SPEC: CPT

## 2023-03-15 PROCEDURE — 85610 PROTHROMBIN TIME: CPT

## 2023-03-15 PROCEDURE — 99211 OFF/OP EST MAY X REQ PHY/QHP: CPT

## 2023-03-15 NOTE — PROGRESS NOTES
Medication Management [address]   [phone] (phone)  [phone] (fax)    Mr. Silvina Rodrigez is a 45 y.o.  male with history of PE, Afib who presents today for anticoagulation monitoring and adjustment. Patient verifies current dosing regimen and tablet strength. No extra doses. Missed dose on 3/13. Did not take any extra warfarin on 3/14/23. Patient denies s/s bleeding/bruising/swelling/chest pain. Patient reports SOB and is scheduled to see pulmonologist of Friday (3/17). No blood in urine or stool. No dietary changes. No changes in medication/OTC agents/Herbals. No change in alcohol use or tobacco use. No change in activity level. Patient denies headaches/dizziness/lightheadedness/falls. No vomiting/diarrhea or acute illness. No Procedures scheduled in the future at this time. Assessment:   Lab Results   Component Value Date    INR 1.80 (H) 03/15/2023    INR 1.70 (H) 02/28/2023    INR 1.40 (H) 02/21/2023     INR subtherapeutic   Recent Labs     03/15/23  1030   INR 1.80*     INR Goal : 2.0-3.0     Patient interview completed and discussed with pharmacist by Noman EmmanuelD Candidate      Plan:  7.5 mg x1, then continue Coumadin 7.5 mg MF, 5 mg TWThSaS. Recheck INR in 2 week(s). Patient reminded to call the Anticoagulation Clinic with any signs or symptoms of bleeding or with any medication changes. Patient given instructions utilizing the teach back method. After visit summary printed and reviewed with patient. Discharged ambulatory in no apparent distress.     For Pharmacy Admin Tracking Only    Time Spent (min): 20

## 2023-03-15 NOTE — TELEPHONE ENCOUNTER
Patient contacted the referral line on 3/9/23 and lvm regarding making a sooner appt  -  I called patient back to see whether it was for pulmonary or sleep. Patient states that he already made a sooner pulmonary appt.

## 2023-03-16 RX ORDER — AMLODIPINE BESYLATE 10 MG/1
10 TABLET ORAL DAILY
Qty: 30 TABLET | Refills: 1 | Status: SHIPPED | OUTPATIENT
Start: 2023-03-16

## 2023-03-17 ENCOUNTER — TELEPHONE (OUTPATIENT)
Dept: PULMONOLOGY | Age: 53
End: 2023-03-17

## 2023-03-29 ENCOUNTER — APPOINTMENT (OUTPATIENT)
Dept: PHARMACY | Age: 53
End: 2023-03-29
Payer: MEDICARE

## 2023-03-30 ENCOUNTER — OFFICE VISIT (OUTPATIENT)
Dept: PULMONOLOGY | Age: 53
End: 2023-03-30
Payer: MEDICARE

## 2023-03-30 VITALS
HEART RATE: 70 BPM | SYSTOLIC BLOOD PRESSURE: 128 MMHG | HEIGHT: 68 IN | DIASTOLIC BLOOD PRESSURE: 86 MMHG | OXYGEN SATURATION: 96 % | BODY MASS INDEX: 43.95 KG/M2 | TEMPERATURE: 97.8 F | WEIGHT: 290 LBS

## 2023-03-30 DIAGNOSIS — E11.69 TYPE 2 DIABETES MELLITUS WITH OTHER SPECIFIED COMPLICATION, WITH LONG-TERM CURRENT USE OF INSULIN (HCC): ICD-10-CM

## 2023-03-30 DIAGNOSIS — U09.9 POST-COVID SYNDROME: Primary | ICD-10-CM

## 2023-03-30 DIAGNOSIS — I10 ESSENTIAL HYPERTENSION: ICD-10-CM

## 2023-03-30 DIAGNOSIS — E66.01 MORBID OBESITY (HCC): ICD-10-CM

## 2023-03-30 DIAGNOSIS — I50.32 CHRONIC HEART FAILURE WITH PRESERVED EJECTION FRACTION (HCC): ICD-10-CM

## 2023-03-30 DIAGNOSIS — G47.33 OSA TREATED WITH BIPAP: ICD-10-CM

## 2023-03-30 DIAGNOSIS — F32.A DEPRESSION, UNSPECIFIED DEPRESSION TYPE: ICD-10-CM

## 2023-03-30 DIAGNOSIS — M1A.9XX1 CHRONIC GOUT WITH TOPHUS, UNSPECIFIED CAUSE, UNSPECIFIED SITE: ICD-10-CM

## 2023-03-30 DIAGNOSIS — Z79.4 TYPE 2 DIABETES MELLITUS WITH OTHER SPECIFIED COMPLICATION, WITH LONG-TERM CURRENT USE OF INSULIN (HCC): ICD-10-CM

## 2023-03-30 PROCEDURE — 3079F DIAST BP 80-89 MM HG: CPT | Performed by: SPECIALIST

## 2023-03-30 PROCEDURE — 3074F SYST BP LT 130 MM HG: CPT | Performed by: SPECIALIST

## 2023-03-30 PROCEDURE — 99204 OFFICE O/P NEW MOD 45 MIN: CPT | Performed by: SPECIALIST

## 2023-03-30 RX ORDER — ALBUTEROL SULFATE 90 UG/1
2 AEROSOL, METERED RESPIRATORY (INHALATION) EVERY 6 HOURS PRN
COMMUNITY

## 2023-03-30 NOTE — PROGRESS NOTES
history of DVT and pulmonary embolism also in the past as noted in the chart.     Immunizations:  Immunization History   Administered Date(s) Administered    COVID-19, PFIZER PURPLE top, DILUTE for use, (age 15 y+), 30mcg/0.3mL 12/30/2020, 01/20/2021    Influenza, FLUARIX, FLULAVAL, FLUZONE (age 10 mo+) AND AFLURIA, (age 1 y+), PF, 0.5mL 01/06/2022    Pneumococcal, PPSV23, PNEUMOVAX 21, (age 2y+), SC/IM, 0.5mL 05/06/2019      Past Medical hx   PMH:  Past Medical History:   Diagnosis Date    Ankylosing spondylitis ([address]     Aortic stenosis, mild to moderate     Atrial fibrillation (HCC)     BPH (benign prostatic hyperplasia)     Carpal tunnel syndrome on right     rt hand    Chronic gout     COVID-19 09/2020    DM2 (diabetes mellitus, type 2) ([address]     DVT (deep venous thrombosis) (HCC)     Dyslipidemia     GERD (gastroesophageal reflux disease)     Heart failure with preserved ejection fraction ([address]     History of alcohol abuse     History of osteomyelitis     Hx of R foot wound, osteomyelitis July 2018 requiring surgery and IV Vanco    Hypertension, essential     Hypogonadism, male     Major depression     Mood disorder ([address]     Morbid obesity ([address]     DURAN (nonalcoholic steatohepatitis)     KIARA treated with BiPAP     Pulmonary emboli ([address]     Vitamin D deficiency      SURGICAL HISTORY:  Past Surgical History:   Procedure Laterality Date    COLONOSCOPY N/A 11/15/2020    COLONOSCOPY DIAGNOSTIC performed by Og Blair MD at [facility]    ENDOSCOPY, COLON, DIAGNOSTIC      FOOT SURGERY Right     2018, R foot osteomyelitis    HIP SURGERY Left 6/29/2020    bilateral hip steroid injection, Left HIP FIRST performed by Louise Costa MD at [facility] Bilateral 4/11/2022    bilateral L-facet MBB # 1 @ L4-5 and L5-S1 performed by Louise Costa MD at [facility] N/A 5/23/2022    LESI  @ L5. performed by Diana Polo

## 2023-04-04 ENCOUNTER — HOSPITAL ENCOUNTER (OUTPATIENT)
Dept: PHARMACY | Age: 53
Setting detail: THERAPIES SERIES
Discharge: HOME | End: 2023-04-04
Payer: MEDICARE

## 2023-04-04 DIAGNOSIS — I26.99 PULMONARY EMBOLISM, UNSPECIFIED CHRONICITY, UNSPECIFIED PULMONARY EMBOLISM TYPE, UNSPECIFIED WHETHER ACUTE COR PULMONALE PRESENT (HCC): ICD-10-CM

## 2023-04-04 DIAGNOSIS — I48.91 ATRIAL FIBRILLATION, UNSPECIFIED TYPE (HCC): Primary | Chronic | ICD-10-CM

## 2023-04-04 DIAGNOSIS — Z79.01 ANTICOAGULATED ON COUMADIN: ICD-10-CM

## 2023-04-04 DIAGNOSIS — Z51.81 ENCOUNTER FOR THERAPEUTIC DRUG MONITORING: ICD-10-CM

## 2023-04-04 LAB — POC INR: 2.8 (ref 0.8–1.2)

## 2023-04-04 PROCEDURE — 99212 OFFICE O/P EST SF 10 MIN: CPT | Performed by: PHARMACIST

## 2023-04-04 PROCEDURE — 36416 COLLJ CAPILLARY BLOOD SPEC: CPT | Performed by: PHARMACIST

## 2023-04-04 PROCEDURE — 85610 PROTHROMBIN TIME: CPT | Performed by: PHARMACIST

## 2023-04-04 RX ORDER — WARFARIN SODIUM 5 MG/1
TABLET ORAL
Qty: 35 TABLET | Refills: 5 | Status: SHIPPED | OUTPATIENT
Start: 2023-04-04

## 2023-04-04 NOTE — PROGRESS NOTES
Medication Management [address]  [phone] (phone)  [phone] (fax)    Mr. Briana Harrell is a 45 y.o.  male with history of PE, Afib who presents today for anticoagulation monitoring and adjustment. Patient verifies current dosing regimen and tablet strength. No missed or extra doses. Patient denies s/s bleeding/bruising/swelling/SOB/chest pain  No blood in urine or stool. No dietary changes. No changes in medication/OTC agents/Herbals. No change in alcohol use or tobacco use. Slightly more active. Patient denies headaches/dizziness/lightheadedness/falls. No vomiting/diarrhea or acute illness. No Procedures scheduled in the future at this time. 1 week late for a two week recheck    Assessment:   Lab Results   Component Value Date    INR 2.80 (H) 04/04/2023    INR 1.80 (H) 03/15/2023    INR 1.70 (H) 02/28/2023     INR therapeutic   Recent Labs     04/04/23  1043   INR 2.80*         Plan:  Continue Coumadin 7.5mg MF and 5mg all other days. Recheck INR in 4 week(s). Patient reminded to call the Anticoagulation Clinic with any signs or symptoms of bleeding or with any medication changes. Patient given instructions utilizing the teach back method. After visit summary printed and reviewed with patient. Discharged in [facility] in no apparent distress.     For Pharmacy Admin Tracking Only    Intervention Detail: Refill(s) Provided  Total # of Interventions Recommended: 1  Total # of Interventions Accepted: 1  Time Spent (min): 20

## 2023-04-11 NOTE — TELEPHONE ENCOUNTER
Notified by RN of patient with increased agitation. Seen bedside. Yelling and aggressive with staff. Given Valium 5mg IVP x1 in ED w/ improvement. Additional 2.5mg IVP of Valium given STAT for severe agitation. Remains on 1:1 for patient and staff safety. Monitor CIWA scores. Awaiting H&P for further recommendations.    Anjelica Lyon PA  Medicine Pre op Risk Assessment    Procedure Bilater Hip Injections  Physician Neuroscience   Date of surgery/procedure TBD    Last OV 5-22-20  Last Stress 10-10-19  Last Echo 10-10-19  Last Cath None In Epic  Last Stent None In Epic  Is patient on blood thinners Eliquis  Hold Meds/how many days ? Notified by RN of patient with increased agitation. Seen bedside. Yelling and aggressive with staff. Given Valium 5mg IVP x1 in ED earlier this evening w/ improvement. Additional 2.5mg IVP of Valium given STAT for severe agitation. Remains on 1:1 for patient and staff safety. Monitor CIWA scores. Awaiting H&P for further recommendations.    Anjelica Lyon PA  Medicine

## 2023-05-04 ENCOUNTER — HOSPITAL ENCOUNTER (OUTPATIENT)
Dept: PHARMACY | Age: 53
Setting detail: THERAPIES SERIES
Discharge: HOME | End: 2023-05-04
Payer: MEDICARE

## 2023-05-04 DIAGNOSIS — Z79.01 ANTICOAGULATED ON COUMADIN: ICD-10-CM

## 2023-05-04 DIAGNOSIS — I26.99 PULMONARY EMBOLISM, UNSPECIFIED CHRONICITY, UNSPECIFIED PULMONARY EMBOLISM TYPE, UNSPECIFIED WHETHER ACUTE COR PULMONALE PRESENT (HCC): ICD-10-CM

## 2023-05-04 DIAGNOSIS — I48.91 ATRIAL FIBRILLATION, UNSPECIFIED TYPE (HCC): Primary | Chronic | ICD-10-CM

## 2023-05-04 DIAGNOSIS — Z51.81 ENCOUNTER FOR THERAPEUTIC DRUG MONITORING: ICD-10-CM

## 2023-05-04 LAB — POC INR: 4.2 (ref 0.8–1.2)

## 2023-05-04 PROCEDURE — 85610 PROTHROMBIN TIME: CPT

## 2023-05-04 PROCEDURE — 99212 OFFICE O/P EST SF 10 MIN: CPT

## 2023-05-04 PROCEDURE — 36416 COLLJ CAPILLARY BLOOD SPEC: CPT

## 2023-05-04 RX ORDER — HYDROCODONE BITARTRATE AND ACETAMINOPHEN 5; 325 MG/1; MG/1
1 TABLET ORAL EVERY 6 HOURS PRN
COMMUNITY

## 2023-05-04 NOTE — PROGRESS NOTES
.sjre     Medication Management [address]  [phone] (phone)  [phone] (fax)    Mr. Adela Wong is a 45 y.o.  male with history of PE/atrial fib. , per referral from BHUPINDER Palacios, who presents today for Warfarin monitoring and adjustment (4 week visit). Patient verifies current dosing regimen and tablet strength. No missed or extra doses. Patient denies bruising/SOB. Had some midsternal chest pain relieved with inhaler. Has usual left leg swelling/numbness since COVID 3 years ago. No blood in urine. Has seen blood in stool \"my whole life\" from hemorrhoids; advised to discuss treatment with doctor. No dietary changes. No changes in medication/OTC agents/herbals. For a \"long time,\" has been using Advil Complete for chronic pain; reminded why it's not good with Coumadin, plus it's hard on stomach/kidneys. No change in alcohol use or tobacco use. No change in activity level. Patient denies headaches/dizziness/lightheadedness/falls. No vomiting/diarrhea or acute illness. No procedures scheduled in the future at this time. Assessment:   Lab Results   Component Value Date    INR 4.20 (H) 05/04/2023    INR 2.80 (H) 04/04/2023    INR 1.80 (H) 03/15/2023     INR supratherapeutic - goal 2-3. Recent Labs     05/04/23  0953   INR 4.20*         Plan:  POCT INR performed/result reviewed. Hold today, Th, then continue PO Coumadin 7.5 mg MF, 5 mg TWThSS. Recheck INR in 12-14 days. (Report given - orders entered by James Nunes., PharmD.)   Patient reminded to call the Anticoagulation Clinic with any signs or symptoms of bleeding or with any medication changes. Patient given instructions utilizing the teach back method. After visit summary printed and reviewed with patient. Advised extra caution. Discharged via transport chair, holding cane, in no apparent distress, to front entrance to wait for LACP.     For Pharmacy Admin Tracking

## 2023-05-15 RX ORDER — HYDRALAZINE HYDROCHLORIDE 50 MG/1
TABLET, FILM COATED ORAL
Qty: 90 TABLET | Refills: 0 | Status: SHIPPED | OUTPATIENT
Start: 2023-05-15

## 2023-05-15 RX ORDER — AMLODIPINE BESYLATE 10 MG/1
10 TABLET ORAL DAILY
Qty: 28 TABLET | OUTPATIENT
Start: 2023-05-15

## 2023-05-15 RX ORDER — AMLODIPINE BESYLATE 10 MG/1
10 TABLET ORAL DAILY
Qty: 30 TABLET | Refills: 0 | Status: SHIPPED | OUTPATIENT
Start: 2023-05-15

## 2023-05-18 ENCOUNTER — HOSPITAL ENCOUNTER (OUTPATIENT)
Dept: PHARMACY | Age: 53
Setting detail: THERAPIES SERIES
Discharge: HOME | End: 2023-05-18
Payer: MEDICARE

## 2023-05-18 DIAGNOSIS — Z51.81 ENCOUNTER FOR THERAPEUTIC DRUG MONITORING: ICD-10-CM

## 2023-05-18 DIAGNOSIS — I48.91 ATRIAL FIBRILLATION, UNSPECIFIED TYPE (HCC): Primary | Chronic | ICD-10-CM

## 2023-05-18 DIAGNOSIS — I26.99 PULMONARY EMBOLISM, UNSPECIFIED CHRONICITY, UNSPECIFIED PULMONARY EMBOLISM TYPE, UNSPECIFIED WHETHER ACUTE COR PULMONALE PRESENT (HCC): ICD-10-CM

## 2023-05-18 DIAGNOSIS — Z79.01 ANTICOAGULATED ON COUMADIN: ICD-10-CM

## 2023-05-18 LAB — POC INR: 2.3 (ref 0.8–1.2)

## 2023-05-18 PROCEDURE — 36416 COLLJ CAPILLARY BLOOD SPEC: CPT

## 2023-05-18 PROCEDURE — 85610 PROTHROMBIN TIME: CPT

## 2023-05-18 PROCEDURE — 99211 OFF/OP EST MAY X REQ PHY/QHP: CPT

## 2023-05-18 NOTE — PROGRESS NOTES
Medication Management [address]  [phone] (phone)  [phone] (fax)    Mr. Delfino Mckeon is a 45 y.o.  male with history of PE, Afib who presents today for anticoagulation monitoring and adjustment. States he missed a dose last Tuesday he thinks it was. He thinks he accidentally took 5 mg instead of scheduled 7.5 mg this past Monday. Patient denies s/s bleeding/bruising/swelling/SOB/chest pain  No blood in urine or stool. No dietary changes. No changes in medication/OTC agents/Herbals. No change in alcohol use or tobacco use. No change in activity level. Patient denies headaches/dizziness/lightheadedness/falls. No vomiting/diarrhea or acute illness. No Procedures scheduled in the future at this time. Assessment:   Lab Results   Component Value Date    INR 2.30 (H) 05/18/2023    INR 4.20 (H) 05/04/2023    INR 2.80 (H) 04/04/2023     INR therapeutic   Recent Labs     05/18/23  1011   INR 2.30*       Plan:  Continue Coumadin 7.5 mg MoFr and 5 mg SuTuWeThSa. Recheck INR in 3 week(s). Patient reminded to call the Anticoagulation Clinic with any signs or symptoms of bleeding or with any medication changes. Patient given instructions utilizing the teach back method. After visit summary printed and reviewed with patient. Discharged in transport chair in no apparent distress.     For Pharmacy Admin Tracking Only  Time Spent (min): 15

## 2023-05-28 ENCOUNTER — HOSPITAL ENCOUNTER (EMERGENCY)
Age: 53
Discharge: HOME | End: 2023-05-28
Attending: EMERGENCY MEDICINE
Payer: MEDICARE

## 2023-05-28 ENCOUNTER — APPOINTMENT (OUTPATIENT)
Dept: CT IMAGING | Age: 53
End: 2023-05-28
Payer: MEDICARE

## 2023-05-28 VITALS
SYSTOLIC BLOOD PRESSURE: 137 MMHG | HEART RATE: 74 BPM | RESPIRATION RATE: 12 BRPM | TEMPERATURE: 99.1 F | DIASTOLIC BLOOD PRESSURE: 92 MMHG | OXYGEN SATURATION: 98 %

## 2023-05-28 DIAGNOSIS — K04.7 DENTAL INFECTION: Primary | ICD-10-CM

## 2023-05-28 LAB
ANION GAP SERPL CALC-SCNC: 14 MEQ/L (ref 8–16)
BASOPHILS ABSOLUTE: 0 THOU/MM3 (ref 0–0.1)
BASOPHILS NFR BLD AUTO: 0.3 %
BUN SERPL-MCNC: 10 MG/DL (ref 7–22)
CALCIUM SERPL-MCNC: 9.2 MG/DL (ref 8.5–10.5)
CHLORIDE SERPL-SCNC: 102 MEQ/L (ref 98–111)
CO2 SERPL-SCNC: 26 MEQ/L (ref 23–33)
CREAT SERPL-MCNC: 1.2 MG/DL (ref 0.4–1.2)
DEPRECATED RDW RBC AUTO: 50.4 FL (ref 35–45)
EOSINOPHIL NFR BLD AUTO: 1.8 %
EOSINOPHILS ABSOLUTE: 0.2 THOU/MM3 (ref 0–0.4)
ERYTHROCYTE [DISTWIDTH] IN BLOOD BY AUTOMATED COUNT: 15.2 % (ref 11.5–14.5)
GFR SERPL CREATININE-BSD FRML MDRD: > 60 ML/MIN/1.73M2
GLUCOSE SERPL-MCNC: 162 MG/DL (ref 70–108)
HCT VFR BLD AUTO: 45 % (ref 42–52)
HGB BLD-MCNC: 14.3 GM/DL (ref 14–18)
IMM GRANULOCYTES # BLD AUTO: 0.04 THOU/MM3 (ref 0–0.07)
IMM GRANULOCYTES NFR BLD AUTO: 0.4 %
LYMPHOCYTES ABSOLUTE: 0.9 THOU/MM3 (ref 1–4.8)
LYMPHOCYTES NFR BLD AUTO: 9.5 %
MCH RBC QN AUTO: 28.7 PG (ref 26–33)
MCHC RBC AUTO-ENTMCNC: 31.8 GM/DL (ref 32.2–35.5)
MCV RBC AUTO: 90.2 FL (ref 80–94)
MONOCYTES ABSOLUTE: 0.5 THOU/MM3 (ref 0.4–1.3)
MONOCYTES NFR BLD AUTO: 5.3 %
NEUTROPHILS NFR BLD AUTO: 82.7 %
NRBC BLD AUTO-RTO: 0 /100 WBC
NT-PROBNP SERPL IA-MCNC: 50.6 PG/ML (ref 0–124)
OSMOLALITY SERPL CALC.SUM OF ELEC: 285.7 MOSMOL/KG (ref 275–300)
PLATELET # BLD AUTO: 244 THOU/MM3 (ref 130–400)
PMV BLD AUTO: 10.3 FL (ref 9.4–12.4)
POTASSIUM SERPL-SCNC: 4.2 MEQ/L (ref 3.5–5.2)
RBC # BLD AUTO: 4.99 MILL/MM3 (ref 4.7–6.1)
SEGMENTED NEUTROPHILS ABSOLUTE COUNT: 7.4 THOU/MM3 (ref 1.8–7.7)
SODIUM SERPL-SCNC: 142 MEQ/L (ref 135–145)
TROPONIN T: < 0.01 NG/ML
WBC # BLD AUTO: 9 THOU/MM3 (ref 4.8–10.8)

## 2023-05-28 PROCEDURE — 85025 COMPLETE CBC W/AUTO DIFF WBC: CPT

## 2023-05-28 PROCEDURE — 83880 ASSAY OF NATRIURETIC PEPTIDE: CPT

## 2023-05-28 PROCEDURE — 6360000004 HC RX CONTRAST MEDICATION: Performed by: EMERGENCY MEDICINE

## 2023-05-28 PROCEDURE — 93005 ELECTROCARDIOGRAM TRACING: CPT | Performed by: EMERGENCY MEDICINE

## 2023-05-28 PROCEDURE — 70487 CT MAXILLOFACIAL W/DYE: CPT

## 2023-05-28 PROCEDURE — 6370000000 HC RX 637 (ALT 250 FOR IP): Performed by: EMERGENCY MEDICINE

## 2023-05-28 PROCEDURE — 96374 THER/PROPH/DIAG INJ IV PUSH: CPT

## 2023-05-28 PROCEDURE — 84484 ASSAY OF TROPONIN QUANT: CPT

## 2023-05-28 PROCEDURE — 36415 COLL VENOUS BLD VENIPUNCTURE: CPT

## 2023-05-28 PROCEDURE — 80048 BASIC METABOLIC PNL TOTAL CA: CPT

## 2023-05-28 PROCEDURE — 99285 EMERGENCY DEPT VISIT HI MDM: CPT

## 2023-05-28 PROCEDURE — 6360000002 HC RX W HCPCS: Performed by: EMERGENCY MEDICINE

## 2023-05-28 PROCEDURE — 93010 ELECTROCARDIOGRAM REPORT: CPT | Performed by: INTERNAL MEDICINE

## 2023-05-28 RX ORDER — KETOROLAC TROMETHAMINE 30 MG/ML
15 INJECTION, SOLUTION INTRAMUSCULAR; INTRAVENOUS ONCE
Status: COMPLETED | OUTPATIENT
Start: 2023-05-28 | End: 2023-05-28

## 2023-05-28 RX ORDER — NAPROXEN 500 MG/1
500 TABLET ORAL 2 TIMES DAILY WITH MEALS
Qty: 10 TABLET | Refills: 0 | Status: SHIPPED | OUTPATIENT
Start: 2023-05-28 | End: 2023-06-02

## 2023-05-28 RX ORDER — HYDROCODONE BITARTRATE AND ACETAMINOPHEN 5; 325 MG/1; MG/1
1 TABLET ORAL ONCE
Status: COMPLETED | OUTPATIENT
Start: 2023-05-28 | End: 2023-05-28

## 2023-05-28 RX ORDER — CLINDAMYCIN HYDROCHLORIDE 300 MG/1
300 CAPSULE ORAL 4 TIMES DAILY
Qty: 28 CAPSULE | Refills: 0 | Status: SHIPPED | OUTPATIENT
Start: 2023-05-28 | End: 2023-06-04

## 2023-05-28 RX ORDER — CHLORHEXIDINE GLUCONATE 0.12 MG/ML
15 RINSE ORAL 3 TIMES DAILY
Qty: 473 ML | Refills: 0 | Status: SHIPPED | OUTPATIENT
Start: 2023-05-28 | End: 2023-06-04

## 2023-05-28 RX ORDER — CLINDAMYCIN HYDROCHLORIDE 150 MG/1
300 CAPSULE ORAL ONCE
Status: COMPLETED | OUTPATIENT
Start: 2023-05-28 | End: 2023-05-28

## 2023-05-28 RX ADMIN — CLINDAMYCIN HYDROCHLORIDE 300 MG: 150 CAPSULE ORAL at 16:46

## 2023-05-28 RX ADMIN — HYDROCODONE BITARTRATE AND ACETAMINOPHEN 1 TABLET: 5; 325 TABLET ORAL at 16:46

## 2023-05-28 RX ADMIN — IOPAMIDOL 80 ML: 755 INJECTION, SOLUTION INTRAVENOUS at 16:36

## 2023-05-28 RX ADMIN — KETOROLAC TROMETHAMINE 15 MG: 30 INJECTION, SOLUTION INTRAMUSCULAR; INTRAVENOUS at 16:46

## 2023-05-28 ASSESSMENT — ENCOUNTER SYMPTOMS
VOMITING: 0
FACIAL SWELLING: 1
ABDOMINAL PAIN: 0
NAUSEA: 0

## 2023-05-30 LAB
EKG ATRIAL RATE: 84 BPM
EKG P AXIS: 50 DEGREES
EKG P-R INTERVAL: 158 MS
EKG Q-T INTERVAL: 380 MS
EKG QRS DURATION: 82 MS
EKG QTC CALCULATION (BAZETT): 449 MS
EKG R AXIS: -32 DEGREES
EKG T AXIS: 39 DEGREES
EKG VENTRICULAR RATE: 84 BPM

## 2023-06-06 RX ORDER — HYDRALAZINE HYDROCHLORIDE 50 MG/1
TABLET, FILM COATED ORAL
Qty: 84 TABLET | Refills: 0 | Status: SHIPPED | OUTPATIENT
Start: 2023-06-06

## 2023-06-06 RX ORDER — AMLODIPINE BESYLATE 10 MG/1
10 TABLET ORAL DAILY
Qty: 28 TABLET | Refills: 0 | Status: SHIPPED | OUTPATIENT
Start: 2023-06-06

## 2023-06-21 ENCOUNTER — APPOINTMENT (OUTPATIENT)
Dept: PHARMACY | Age: 53
End: 2023-06-21
Payer: MEDICARE

## 2023-06-27 ENCOUNTER — APPOINTMENT (OUTPATIENT)
Dept: PHARMACY | Age: 53
End: 2023-06-27
Payer: MEDICARE

## 2023-06-29 ENCOUNTER — HOSPITAL ENCOUNTER (OUTPATIENT)
Dept: PHARMACY | Age: 53
Setting detail: THERAPIES SERIES
Discharge: HOME | End: 2023-06-29
Payer: MEDICARE

## 2023-06-29 DIAGNOSIS — Z51.81 ENCOUNTER FOR THERAPEUTIC DRUG MONITORING: ICD-10-CM

## 2023-06-29 DIAGNOSIS — I48.91 ATRIAL FIBRILLATION, UNSPECIFIED TYPE (HCC): Primary | Chronic | ICD-10-CM

## 2023-06-29 DIAGNOSIS — Z79.01 ANTICOAGULATED ON COUMADIN: ICD-10-CM

## 2023-06-29 DIAGNOSIS — I26.99 PULMONARY EMBOLISM, UNSPECIFIED CHRONICITY, UNSPECIFIED PULMONARY EMBOLISM TYPE, UNSPECIFIED WHETHER ACUTE COR PULMONALE PRESENT (HCC): ICD-10-CM

## 2023-06-29 LAB — POC INR: 1.1 (ref 0.8–1.2)

## 2023-06-29 PROCEDURE — 99212 OFFICE O/P EST SF 10 MIN: CPT

## 2023-06-29 PROCEDURE — 36416 COLLJ CAPILLARY BLOOD SPEC: CPT

## 2023-06-29 PROCEDURE — 85610 PROTHROMBIN TIME: CPT

## 2023-06-29 RX ORDER — WARFARIN SODIUM 5 MG/1
TABLET ORAL
Qty: 35 TABLET | Refills: 5 | Status: SHIPPED | OUTPATIENT
Start: 2023-06-29

## 2023-06-29 RX ORDER — DEXAMETHASONE 4 MG/1
2 TABLET ORAL 2 TIMES DAILY
COMMUNITY
Start: 2023-05-17

## 2023-06-29 RX ORDER — PANTOPRAZOLE SODIUM 40 MG/1
TABLET, DELAYED RELEASE ORAL DAILY
COMMUNITY
Start: 2023-06-19

## 2023-06-29 RX ORDER — WARFARIN SODIUM 5 MG/1
TABLET ORAL EVERY EVENING
COMMUNITY

## 2023-07-03 ENCOUNTER — TELEPHONE (OUTPATIENT)
Dept: PHARMACY | Age: 53
End: 2023-07-03

## 2023-07-03 NOTE — TELEPHONE ENCOUNTER
Spoke with patient. He states he has not taken any Coumadin since last visit. He states he will not be able to get his Coumadin delivered until this Thursday 7/6/23. Patient's INR visit for 7/5/23 cancelled at this time until patient gets medication resumed. Informed patient that clinic will give him a call Thursday to ensure he has his medication, give him updated Coumadin instructions, and reschedule his appointment. Patient voiced understanding.     Gwen Morales PharmD, BCPS  7/3/2023  4:52 PM

## 2023-07-03 NOTE — TELEPHONE ENCOUNTER
Jarrellklaudia Shante hasn't had any coumadin since last week. He said his pharmacy didn't deliver to him.

## 2023-07-05 ENCOUNTER — APPOINTMENT (OUTPATIENT)
Dept: PHARMACY | Age: 53
End: 2023-07-05
Payer: MEDICARE

## 2023-07-05 RX ORDER — HYDRALAZINE HYDROCHLORIDE 50 MG/1
TABLET, FILM COATED ORAL
Qty: 84 TABLET | Refills: 0 | OUTPATIENT
Start: 2023-07-05

## 2023-07-05 RX ORDER — AMLODIPINE BESYLATE 10 MG/1
10 TABLET ORAL DAILY
Qty: 28 TABLET | Refills: 0 | OUTPATIENT
Start: 2023-07-05

## 2023-07-06 ENCOUNTER — TELEPHONE (OUTPATIENT)
Dept: PHARMACY | Age: 53
End: 2023-07-06

## 2023-07-06 NOTE — TELEPHONE ENCOUNTER
Called and spoke with patient. He is to take 10 mg today and tomorrow (Fr), then resume Coumadin 7.5 mg MoFr and 5 mg SuTuWeThSa. Rescheduled next appointment to 7/17/23. He repeated directions to show understanding.     Mer Valdez, PharmD, BCPS    For Pharmacy Admin Tracking Only  Time Spent (min): 5

## 2023-07-06 NOTE — TELEPHONE ENCOUNTER
Patient called and left a message that he has received his Coumadin. Please call the patient with instructions.

## 2023-07-17 ENCOUNTER — HOSPITAL ENCOUNTER (OUTPATIENT)
Dept: PHARMACY | Age: 53
Setting detail: THERAPIES SERIES
Discharge: HOME | End: 2023-07-17
Payer: MEDICARE

## 2023-07-17 DIAGNOSIS — Z79.01 ANTICOAGULATED ON COUMADIN: ICD-10-CM

## 2023-07-17 DIAGNOSIS — I26.99 PULMONARY EMBOLISM, UNSPECIFIED CHRONICITY, UNSPECIFIED PULMONARY EMBOLISM TYPE, UNSPECIFIED WHETHER ACUTE COR PULMONALE PRESENT (HCC): ICD-10-CM

## 2023-07-17 DIAGNOSIS — I48.91 ATRIAL FIBRILLATION, UNSPECIFIED TYPE (HCC): Primary | Chronic | ICD-10-CM

## 2023-07-17 DIAGNOSIS — Z51.81 ENCOUNTER FOR THERAPEUTIC DRUG MONITORING: ICD-10-CM

## 2023-07-17 LAB — POC INR: 1.9 (ref 0.8–1.2)

## 2023-07-17 PROCEDURE — 99212 OFFICE O/P EST SF 10 MIN: CPT

## 2023-07-17 PROCEDURE — 85610 PROTHROMBIN TIME: CPT

## 2023-07-17 PROCEDURE — 36416 COLLJ CAPILLARY BLOOD SPEC: CPT

## 2023-07-17 NOTE — PROGRESS NOTES
Medication Management [address] [place] [address]  [phone] (phone)  [phone] (fax)    Mr. Kim Marcus is a 45 y.o.  male with history of PE, Afib who presents today for anticoagulation monitoring and adjustment. Patient verifies current dosing regimen and tablet strength. No extra doses. Missed doses from 6/28 to 7/5 due to warfarin not having been delivered. Restarted warfarin 7/6 and followed clinic instructions. Patient denies s/s bleeding/bruising/swelling/SOB. +SOB at baseline  No dietary changes. No changes in OTC agents/Herbals. Stopped taking Lasix a few months ago. No change in alcohol use or tobacco use. No change in activity level. Patient denies falls. No vomiting/diarrhea or acute illness. Feeling slow and sluggish, has been going on for awhile. Scheduled with PCP on Friday. No Procedures scheduled in the future at this time. Assessment:   Lab Results   Component Value Date    INR 1.90 (H) 07/17/2023    INR 1.10 06/29/2023    INR 1.00 06/13/2023     INR subtherapeutic   Recent Labs     07/17/23  1019   INR 1.90*         Plan:  Coumadin 10 mg today, then continue Coumadin 7.5 mg MF, 5 mg TWThSS. Recheck INR in 2 week(s). Patient reminded to call the Anticoagulation Clinic with any signs or symptoms of bleeding or with any medication changes. Patient given instructions utilizing the teach back method. After visit summary printed and reviewed with patient. Discharged ambulatory in no apparent distress.     Kajal Kimball RPh  7/17/2023 10:18 AM     For Pharmacy Admin Tracking Only    Intervention Detail: Dose Adjustment: 1, reason: Therapy Optimization  Total # of Interventions Recommended: 1  Total # of Interventions Accepted: 1  Time Spent (min): 30

## 2023-07-28 ENCOUNTER — HOSPITAL ENCOUNTER (OUTPATIENT)
Age: 53
Setting detail: SPECIMEN
Discharge: HOME | End: 2023-07-28

## 2023-07-28 LAB
25(OH)D3 SERPL-MCNC: 24.5 NG/ML
ALBUMIN SERPL-MCNC: 4 G/DL (ref 3.5–5.2)
ALBUMIN/GLOB SERPL: 0.9 {RATIO} (ref 1–2.5)
ALP SERPL-CCNC: 137 U/L (ref 40–129)
ALT SERPL-CCNC: 38 U/L (ref 5–41)
ANION GAP SERPL CALCULATED.3IONS-SCNC: 16 MMOL/L (ref 9–17)
AST SERPL-CCNC: 59 U/L
BILIRUB SERPL-MCNC: 0.5 MG/DL (ref 0.3–1.2)
BUN SERPL-MCNC: 21 MG/DL (ref 6–20)
CALCIUM SERPL-MCNC: 9.9 MG/DL (ref 8.6–10.4)
CHLORIDE SERPL-SCNC: 105 MMOL/L (ref 98–107)
CHOLEST SERPL-MCNC: 138 MG/DL
CHOLESTEROL/HDL RATIO: 4.1
CO2 SERPL-SCNC: 21 MMOL/L (ref 20–31)
CREAT SERPL-MCNC: 1 MG/DL (ref 0.7–1.2)
ERYTHROCYTE [DISTWIDTH] IN BLOOD BY AUTOMATED COUNT: 15.3 % (ref 11.8–14.4)
GFR SERPL CREATININE-BSD FRML MDRD: >60 ML/MIN/1.73M2
GLUCOSE SERPL-MCNC: 160 MG/DL (ref 70–99)
HCT VFR BLD AUTO: 47.5 % (ref 40.7–50.3)
HDLC SERPL-MCNC: 34 MG/DL
HGB BLD-MCNC: 15.1 G/DL (ref 13–17)
LDLC SERPL CALC-MCNC: 73 MG/DL (ref 0–130)
MCH RBC QN AUTO: 28.8 PG (ref 25.2–33.5)
MCHC RBC AUTO-ENTMCNC: 31.8 G/DL (ref 28.4–34.8)
MCV RBC AUTO: 90.6 FL (ref 82.6–102.9)
NRBC BLD-RTO: 0 PER 100 WBC
PLATELET # BLD AUTO: 312 K/UL (ref 138–453)
PMV BLD AUTO: 11.4 FL (ref 8.1–13.5)
POTASSIUM SERPL-SCNC: 4.6 MMOL/L (ref 3.7–5.3)
PROT SERPL-MCNC: 8.3 G/DL (ref 6.4–8.3)
RBC # BLD AUTO: 5.24 M/UL (ref 4.21–5.77)
SODIUM SERPL-SCNC: 142 MMOL/L (ref 135–144)
TRIGL SERPL-MCNC: 157 MG/DL
TSH SERPL DL<=0.05 MIU/L-ACNC: 3.69 UIU/ML (ref 0.3–5)
URATE SERPL-MCNC: 6.4 MG/DL (ref 3.4–7)
WBC OTHER # BLD: 11.2 K/UL (ref 3.5–11.3)

## 2023-07-31 ENCOUNTER — APPOINTMENT (OUTPATIENT)
Dept: PHARMACY | Age: 53
End: 2023-07-31
Payer: MEDICARE

## 2023-07-31 ENCOUNTER — HOSPITAL ENCOUNTER (EMERGENCY)
Age: 53
Discharge: HOME | End: 2023-07-31
Payer: MEDICARE

## 2023-07-31 VITALS
RESPIRATION RATE: 28 BRPM | OXYGEN SATURATION: 98 % | DIASTOLIC BLOOD PRESSURE: 97 MMHG | SYSTOLIC BLOOD PRESSURE: 163 MMHG | HEART RATE: 68 BPM | BODY MASS INDEX: 44.09 KG/M2 | TEMPERATURE: 97.4 F | WEIGHT: 290 LBS

## 2023-07-31 DIAGNOSIS — R53.82 CHRONIC FATIGUE: Primary | ICD-10-CM

## 2023-07-31 DIAGNOSIS — I48.91 ATRIAL FIBRILLATION, UNSPECIFIED TYPE (HCC): Primary | Chronic | ICD-10-CM

## 2023-07-31 DIAGNOSIS — Z79.01 ANTICOAGULATED ON COUMADIN: ICD-10-CM

## 2023-07-31 DIAGNOSIS — I26.99 PULMONARY EMBOLISM, UNSPECIFIED CHRONICITY, UNSPECIFIED PULMONARY EMBOLISM TYPE, UNSPECIFIED WHETHER ACUTE COR PULMONALE PRESENT (HCC): ICD-10-CM

## 2023-07-31 DIAGNOSIS — G47.30 SLEEP APNEA, UNSPECIFIED TYPE: ICD-10-CM

## 2023-07-31 DIAGNOSIS — Z51.81 ENCOUNTER FOR THERAPEUTIC DRUG MONITORING: ICD-10-CM

## 2023-07-31 DIAGNOSIS — I10 ESSENTIAL HYPERTENSION: ICD-10-CM

## 2023-07-31 LAB
ALBUMIN SERPL BCG-MCNC: 3.8 G/DL (ref 3.5–5.1)
ALP SERPL-CCNC: 151 U/L (ref 38–126)
ALT SERPL W/O P-5'-P-CCNC: 51 U/L (ref 11–66)
ANION GAP SERPL CALC-SCNC: 14 MEQ/L (ref 8–16)
AST SERPL-CCNC: 79 U/L (ref 5–40)
BASOPHILS ABSOLUTE: 0 THOU/MM3 (ref 0–0.1)
BASOPHILS NFR BLD AUTO: 0.4 %
BILIRUB SERPL-MCNC: 0.4 MG/DL (ref 0.3–1.2)
BUN SERPL-MCNC: 22 MG/DL (ref 7–22)
CALCIUM SERPL-MCNC: 9.5 MG/DL (ref 8.5–10.5)
CHLORIDE SERPL-SCNC: 101 MEQ/L (ref 98–111)
CO2 SERPL-SCNC: 25 MEQ/L (ref 23–33)
CREAT SERPL-MCNC: 1 MG/DL (ref 0.4–1.2)
DEPRECATED RDW RBC AUTO: 51.1 FL (ref 35–45)
EOSINOPHIL NFR BLD AUTO: 1.9 %
EOSINOPHILS ABSOLUTE: 0.2 THOU/MM3 (ref 0–0.4)
ERYTHROCYTE [DISTWIDTH] IN BLOOD BY AUTOMATED COUNT: 15.3 % (ref 11.5–14.5)
GFR SERPL CREATININE-BSD FRML MDRD: > 60 ML/MIN/1.73M2
GLUCOSE SERPL-MCNC: 260 MG/DL (ref 70–108)
HCT VFR BLD AUTO: 45.3 % (ref 42–52)
HGB BLD-MCNC: 14.3 GM/DL (ref 14–18)
IMM GRANULOCYTES # BLD AUTO: 0.07 THOU/MM3 (ref 0–0.07)
IMM GRANULOCYTES NFR BLD AUTO: 0.7 %
LYMPHOCYTES ABSOLUTE: 1.6 THOU/MM3 (ref 1–4.8)
LYMPHOCYTES NFR BLD AUTO: 17.1 %
MAGNESIUM SERPL-MCNC: 1.9 MG/DL (ref 1.6–2.4)
MCH RBC QN AUTO: 28.7 PG (ref 26–33)
MCHC RBC AUTO-ENTMCNC: 31.6 GM/DL (ref 32.2–35.5)
MCV RBC AUTO: 90.8 FL (ref 80–94)
MONOCYTES ABSOLUTE: 0.5 THOU/MM3 (ref 0.4–1.3)
MONOCYTES NFR BLD AUTO: 5.1 %
NEUTROPHILS NFR BLD AUTO: 74.8 %
NRBC BLD AUTO-RTO: 0 /100 WBC
OSMOLALITY SERPL CALC.SUM OF ELEC: 291.7 MOSMOL/KG (ref 275–300)
PLATELET # BLD AUTO: 283 THOU/MM3 (ref 130–400)
PMV BLD AUTO: 11 FL (ref 9.4–12.4)
POC INR: 1.5 (ref 0.8–1.2)
POTASSIUM SERPL-SCNC: 4.6 MEQ/L (ref 3.5–5.2)
PROT SERPL-MCNC: 8.5 G/DL (ref 6.1–8)
RBC # BLD AUTO: 4.99 MILL/MM3 (ref 4.7–6.1)
SEGMENTED NEUTROPHILS ABSOLUTE COUNT: 7 THOU/MM3 (ref 1.8–7.7)
SODIUM SERPL-SCNC: 140 MEQ/L (ref 135–145)
T4 FREE SERPL-MCNC: 1.19 NG/DL (ref 0.93–1.76)
TSH SERPL DL<=0.005 MIU/L-ACNC: 2.76 UIU/ML (ref 0.4–4.2)
WBC # BLD AUTO: 9.4 THOU/MM3 (ref 4.8–10.8)

## 2023-07-31 PROCEDURE — 99212 OFFICE O/P EST SF 10 MIN: CPT | Performed by: PHARMACIST

## 2023-07-31 PROCEDURE — 93005 ELECTROCARDIOGRAM TRACING: CPT | Performed by: NURSE PRACTITIONER

## 2023-07-31 PROCEDURE — 36416 COLLJ CAPILLARY BLOOD SPEC: CPT | Performed by: PHARMACIST

## 2023-07-31 PROCEDURE — 83735 ASSAY OF MAGNESIUM: CPT

## 2023-07-31 PROCEDURE — 84443 ASSAY THYROID STIM HORMONE: CPT

## 2023-07-31 PROCEDURE — 85610 PROTHROMBIN TIME: CPT | Performed by: PHARMACIST

## 2023-07-31 PROCEDURE — 99284 EMERGENCY DEPT VISIT MOD MDM: CPT

## 2023-07-31 PROCEDURE — 36415 COLL VENOUS BLD VENIPUNCTURE: CPT

## 2023-07-31 PROCEDURE — 85025 COMPLETE CBC W/AUTO DIFF WBC: CPT

## 2023-07-31 PROCEDURE — 84439 ASSAY OF FREE THYROXINE: CPT

## 2023-07-31 PROCEDURE — 80053 COMPREHEN METABOLIC PANEL: CPT

## 2023-07-31 RX ORDER — WARFARIN SODIUM 5 MG/1
TABLET ORAL
Qty: 60 TABLET | Refills: 1 | Status: SHIPPED | OUTPATIENT
Start: 2023-07-31

## 2023-07-31 ASSESSMENT — PAIN DESCRIPTION - DESCRIPTORS: DESCRIPTORS: CRAMPING;DISCOMFORT

## 2023-07-31 ASSESSMENT — PAIN DESCRIPTION - LOCATION: LOCATION: ABDOMEN

## 2023-07-31 ASSESSMENT — PAIN DESCRIPTION - PAIN TYPE: TYPE: ACUTE PAIN

## 2023-07-31 ASSESSMENT — PAIN SCALES - GENERAL: PAINLEVEL_OUTOF10: 5

## 2023-07-31 NOTE — PROGRESS NOTES
Medication Management [address] [facility]  [phone] (phone)  [phone] (fax)    Mr. Chela Mera is a 45 y.o.  male with history of PE, Afib  who presents today for anticoagulation monitoring and adjustment. Patient verifies current dosing regimen and tablet strength. No missed or extra doses. Patient denies s/s bleeding/bruising/swelling/SOB  No blood in urine or stool. No dietary changes. No changes in medication/OTC agents/Herbals. No change in alcohol use or tobacco use. No change in activity level. Patient denies falls. No vomiting/diarrhea or acute illness. Had diarrhea yesterday, been feeling slow and sluggish for months  No Procedures scheduled in the future at this time. Assessment:   Lab Results   Component Value Date    INR 1.50 (H) 07/31/2023    INR 1.90 (H) 07/17/2023    INR 1.10 06/29/2023     INR subtherapeutic   Recent Labs     07/31/23  0947   INR 1.50*     Patient interview completed and discussed with pharmacist by NEEL Wilson Student    Plan:  Coumadin 10 mg x 1 then increase Coumadin 5 mg MWF, 7.5 mg TThSS (12.5% increase). Sent Rx for warfarin 5 mg tablets to [facility]. Recheck INR in 2 week(s). Patient reminded to call the Anticoagulation Clinic with signs or symptoms of bleeding or with any medication changes. Patient given instructions utilizing the teach back method. After visit summary printed and reviewed with patient. Patient is upset that his providers have not been able to tell him why he is fatigued. He reports no other symptoms. He wishes to be seen in ED today. Wheeled patient over to ED.       For Pharmacy Admin Tracking Only    Intervention Detail: Dose Adjustment: 1, reason: Therapy Optimization and Refill(s) Provided  Total # of Interventions Recommended: 2  Total # of Interventions Accepted: 2  Time Spent (min): [address], [place], BCPS  7/31/2023  10:18 AM

## 2023-08-01 PROCEDURE — 93010 ELECTROCARDIOGRAM REPORT: CPT | Performed by: INTERNAL MEDICINE

## 2023-08-04 ENCOUNTER — TELEPHONE (OUTPATIENT)
Dept: PHARMACY | Age: 53
End: 2023-08-04

## 2023-08-04 LAB
EKG ATRIAL RATE: 66 BPM
EKG P AXIS: 46 DEGREES
EKG P-R INTERVAL: 174 MS
EKG Q-T INTERVAL: 436 MS
EKG QRS DURATION: 84 MS
EKG QTC CALCULATION (BAZETT): 457 MS
EKG R AXIS: -26 DEGREES
EKG T AXIS: 41 DEGREES
EKG VENTRICULAR RATE: 66 BPM

## 2023-08-04 NOTE — TELEPHONE ENCOUNTER
Patient called and left a message that he is having a tooth removed. He would like a pharmacist to call him back regarding Coumadin instructions.

## 2023-08-05 ENCOUNTER — APPOINTMENT (OUTPATIENT)
Dept: CT IMAGING | Age: 53
End: 2023-08-05
Payer: MEDICARE

## 2023-08-05 ENCOUNTER — HOSPITAL ENCOUNTER (EMERGENCY)
Age: 53
Discharge: ANOTHER HOSPITAL | End: 2023-08-05
Attending: EMERGENCY MEDICINE
Payer: MEDICARE

## 2023-08-05 VITALS
WEIGHT: 289 LBS | SYSTOLIC BLOOD PRESSURE: 136 MMHG | OXYGEN SATURATION: 99 % | HEART RATE: 70 BPM | BODY MASS INDEX: 43.8 KG/M2 | HEIGHT: 68 IN | RESPIRATION RATE: 19 BRPM | TEMPERATURE: 98.2 F | DIASTOLIC BLOOD PRESSURE: 91 MMHG

## 2023-08-05 DIAGNOSIS — L02.01 FACIAL ABSCESS: ICD-10-CM

## 2023-08-05 DIAGNOSIS — K04.7 DENTAL INFECTION: Primary | ICD-10-CM

## 2023-08-05 DIAGNOSIS — K02.9 DENTAL DECAY: ICD-10-CM

## 2023-08-05 DIAGNOSIS — K04.7 DENTAL ABSCESS: ICD-10-CM

## 2023-08-05 LAB
ANION GAP SERPL CALC-SCNC: 14 MEQ/L (ref 8–16)
BASOPHILS ABSOLUTE: 0.1 THOU/MM3 (ref 0–0.1)
BASOPHILS NFR BLD AUTO: 0.5 %
BUN SERPL-MCNC: 15 MG/DL (ref 7–22)
CALCIUM SERPL-MCNC: 9.7 MG/DL (ref 8.5–10.5)
CHLORIDE SERPL-SCNC: 95 MEQ/L (ref 98–111)
CO2 SERPL-SCNC: 25 MEQ/L (ref 23–33)
CREAT SERPL-MCNC: 1.1 MG/DL (ref 0.4–1.2)
DEPRECATED RDW RBC AUTO: 47.7 FL (ref 35–45)
EOSINOPHIL NFR BLD AUTO: 1.1 %
EOSINOPHILS ABSOLUTE: 0.1 THOU/MM3 (ref 0–0.4)
ERYTHROCYTE [DISTWIDTH] IN BLOOD BY AUTOMATED COUNT: 15 % (ref 11.5–14.5)
GFR SERPL CREATININE-BSD FRML MDRD: > 60 ML/MIN/1.73M2
GLUCOSE SERPL-MCNC: 471 MG/DL (ref 70–108)
HCT VFR BLD AUTO: 47.8 % (ref 42–52)
HGB BLD-MCNC: 15.5 GM/DL (ref 14–18)
IMM GRANULOCYTES # BLD AUTO: 0.03 THOU/MM3 (ref 0–0.07)
IMM GRANULOCYTES NFR BLD AUTO: 0.3 %
INR PPP: 1.99 (ref 0.85–1.13)
LYMPHOCYTES ABSOLUTE: 1.4 THOU/MM3 (ref 1–4.8)
LYMPHOCYTES NFR BLD AUTO: 13.3 %
MCH RBC QN AUTO: 28.7 PG (ref 26–33)
MCHC RBC AUTO-ENTMCNC: 32.4 GM/DL (ref 32.2–35.5)
MCV RBC AUTO: 88.4 FL (ref 80–94)
MONOCYTES ABSOLUTE: 0.6 THOU/MM3 (ref 0.4–1.3)
MONOCYTES NFR BLD AUTO: 5.7 %
NEUTROPHILS NFR BLD AUTO: 79.1 %
NRBC BLD AUTO-RTO: 0 /100 WBC
OSMOLALITY SERPL CALC.SUM OF ELEC: 289.8 MOSMOL/KG (ref 275–300)
PLATELET # BLD AUTO: 274 THOU/MM3 (ref 130–400)
PMV BLD AUTO: 10.8 FL (ref 9.4–12.4)
POTASSIUM SERPL-SCNC: 4.6 MEQ/L (ref 3.5–5.2)
RBC # BLD AUTO: 5.41 MILL/MM3 (ref 4.7–6.1)
SEGMENTED NEUTROPHILS ABSOLUTE COUNT: 8.3 THOU/MM3 (ref 1.8–7.7)
SODIUM SERPL-SCNC: 134 MEQ/L (ref 135–145)
WBC # BLD AUTO: 10.5 THOU/MM3 (ref 4.8–10.8)

## 2023-08-05 PROCEDURE — 6360000002 HC RX W HCPCS

## 2023-08-05 PROCEDURE — 96367 TX/PROPH/DG ADDL SEQ IV INF: CPT

## 2023-08-05 PROCEDURE — 36415 COLL VENOUS BLD VENIPUNCTURE: CPT

## 2023-08-05 PROCEDURE — 6360000004 HC RX CONTRAST MEDICATION

## 2023-08-05 PROCEDURE — 6360000002 HC RX W HCPCS: Performed by: STUDENT IN AN ORGANIZED HEALTH CARE EDUCATION/TRAINING PROGRAM

## 2023-08-05 PROCEDURE — 6370000000 HC RX 637 (ALT 250 FOR IP)

## 2023-08-05 PROCEDURE — 99285 EMERGENCY DEPT VISIT HI MDM: CPT

## 2023-08-05 PROCEDURE — 96376 TX/PRO/DX INJ SAME DRUG ADON: CPT

## 2023-08-05 PROCEDURE — 96375 TX/PRO/DX INJ NEW DRUG ADDON: CPT

## 2023-08-05 PROCEDURE — 96365 THER/PROPH/DIAG IV INF INIT: CPT

## 2023-08-05 PROCEDURE — 85025 COMPLETE CBC W/AUTO DIFF WBC: CPT

## 2023-08-05 PROCEDURE — 85610 PROTHROMBIN TIME: CPT

## 2023-08-05 PROCEDURE — 80048 BASIC METABOLIC PNL TOTAL CA: CPT

## 2023-08-05 PROCEDURE — 70481 CT ORBIT/EAR/FOSSA W/DYE: CPT

## 2023-08-05 RX ORDER — METRONIDAZOLE 500 MG/100ML
500 INJECTION, SOLUTION INTRAVENOUS ONCE
Status: COMPLETED | OUTPATIENT
Start: 2023-08-05 | End: 2023-08-05

## 2023-08-05 RX ORDER — HYDROCODONE BITARTRATE AND ACETAMINOPHEN 5; 325 MG/1; MG/1
1 TABLET ORAL ONCE
Status: COMPLETED | OUTPATIENT
Start: 2023-08-05 | End: 2023-08-05

## 2023-08-05 RX ORDER — CLINDAMYCIN PHOSPHATE 600 MG/50ML
600 INJECTION INTRAVENOUS ONCE
Status: DISCONTINUED | OUTPATIENT
Start: 2023-08-05 | End: 2023-08-05 | Stop reason: RX

## 2023-08-05 RX ORDER — MORPHINE SULFATE 4 MG/ML
4 INJECTION, SOLUTION INTRAMUSCULAR; INTRAVENOUS ONCE
Status: COMPLETED | OUTPATIENT
Start: 2023-08-05 | End: 2023-08-05

## 2023-08-05 RX ADMIN — METRONIDAZOLE 500 MG: 500 INJECTION, SOLUTION INTRAVENOUS at 15:10

## 2023-08-05 RX ADMIN — IOPAMIDOL 80 ML: 755 INJECTION, SOLUTION INTRAVENOUS at 11:42

## 2023-08-05 RX ADMIN — MORPHINE SULFATE 4 MG: 4 INJECTION, SOLUTION INTRAMUSCULAR; INTRAVENOUS at 14:30

## 2023-08-05 RX ADMIN — HYDROCODONE BITARTRATE AND ACETAMINOPHEN 1 TABLET: 5; 325 TABLET ORAL at 10:31

## 2023-08-05 RX ADMIN — Medication 2000 MG: at 14:30

## 2023-08-05 RX ADMIN — MORPHINE SULFATE 4 MG: 4 INJECTION, SOLUTION INTRAMUSCULAR; INTRAVENOUS at 19:47

## 2023-08-05 ASSESSMENT — PAIN SCALES - GENERAL
PAINLEVEL_OUTOF10: 9
PAINLEVEL_OUTOF10: 10
PAINLEVEL_OUTOF10: 8
PAINLEVEL_OUTOF10: 10
PAINLEVEL_OUTOF10: 10
PAINLEVEL_OUTOF10: 9

## 2023-08-05 ASSESSMENT — PAIN DESCRIPTION - LOCATION
LOCATION: TEETH
LOCATION: TEETH;MOUTH
LOCATION: TEETH

## 2023-08-05 ASSESSMENT — PAIN - FUNCTIONAL ASSESSMENT
PAIN_FUNCTIONAL_ASSESSMENT: 0-10
PAIN_FUNCTIONAL_ASSESSMENT: 0-10

## 2023-08-05 ASSESSMENT — PAIN DESCRIPTION - DESCRIPTORS: DESCRIPTORS: THROBBING

## 2023-08-05 ASSESSMENT — PAIN DESCRIPTION - ORIENTATION
ORIENTATION: UPPER
ORIENTATION: UPPER

## 2023-08-05 NOTE — ED NOTES
Patient sitting on cart. Patient updated on plan of care and pending transfer. Patient complains of right upper mouth pain rated 9 out of 10. Respirations unlabored.      Gage Andino RN  08/05/23 7654

## 2023-08-05 NOTE — ED PROVIDER NOTES
[facility] EMERGENCY DEPT      EMERGENCY MEDICINE     Pt Name: Kaleb Juarez  MRN: [id number]  [address] 1970  Date of evaluation: 8/5/2023  Resident Physician: Sagrario Mata MD  Attending Physician: Dr. Joanne Rivera       Chief Complaint   Patient presents with    Dental Pain     HISTORY OF PRESENT ILLNESS   Kaleb Juarez is a 45 y.o. male who presents to the emergency department from home, by private vehicle for evaluation of dental pain    Patient says he has been having recurrent dental pain for quite some time and on Thursday he was seen at the urgent care and placed on clindamycin for dental abscess. Patient said he initially got some relief however the pain is come back and is much worse and he also noticed swelling in his left cheek and pain with eye motions. Patient's not having any blurry vision or double vision. Patient says he has felt warm but has not checked his temperature. Patient says he is only been taken Tylenol at home for pain relief. Patient without any nausea vomiting chest pain or abdominal pain. Patient says he has had multiple dental issues in the past.  Patient says he came in hoping to get pain medicine to bridge him until he can be seen by his dentist on 10 August.  Patient has been taking his clindamycin as directed. The patient has no other acute complaints at this time. Review of Systems    Negative Except as Documented Above.     PASTMEDICAL HISTORY     Past Medical History:   Diagnosis Date    Ankylosing spondylitis ([address])     Aortic stenosis, mild to moderate     Atrial fibrillation (HCC)     BPH (benign prostatic hyperplasia)     Carpal tunnel syndrome on right     rt hand    Chronic gout     COVID-19 09/2020    DM2 (diabetes mellitus, type 2) ([address])     DVT (deep venous thrombosis) (HCC)     Dyslipidemia     GERD (gastroesophageal reflux disease)     Heart failure with preserved ejection fraction ([address])     History of alcohol abuse 250.00    BUSPIRONE (BUSPAR) 10 MG TABLET    Take 1 tablet by mouth 2 times daily    CHOLECALCIFEROL PO    Take 2,000 Units by mouth daily    FLOVENT  MCG/ACT INHALER    2 puffs 2 times daily    FUROSEMIDE (LASIX) 20 MG TABLET    TAKE 1 TABLET BY MOUTH DAILY    HYDRALAZINE (APRESOLINE) 50 MG TABLET    TAKE 1 TABLET BY MOUTH THREE TIMES A DAY    MULTIPLE VITAMINS-MINERALS (THERAPEUTIC MULTIVITAMIN-MINERALS) TABLET    Take 1 tablet by mouth daily    OXYCODONE-ACETAMINOPHEN (PERCOCET) 5-325 MG PER TABLET    Take 1 tablet by mouth every 6 hours as needed for Pain. PANTOPRAZOLE (PROTONIX) 40 MG TABLET    Take by mouth daily    POTASSIUM CHLORIDE (KLOR-CON M) 10 MEQ EXTENDED RELEASE TABLET    Take 1 tablet by mouth 2 times daily    PROBIOTIC PRODUCT (SOBIA-BID PROBIOTIC PO)    Take 1 tablet by mouth daily     SITAGLIPTAN-METFORMIN (JANUMET)  MG PER TABLET    Take 1 tablet by mouth 2 times daily (with meals)    VITAMIN A 3 MG (10272 UT) TABS    Take 3 mg by mouth daily    VITAMIN C (ASCORBIC ACID) 500 MG TABLET    Take 2 tablets by mouth daily    VITAMIN E 400 UNIT CAPSULE    Take 1 capsule by mouth daily    WARFARIN (COUMADIN) 5 MG TABLET    Take by mouth every evening Dosed by [facility]. WARFARIN (COUMADIN) 5 MG TABLET    Take as directed by [facility] (#60 ~ 30 day supply)       ALLERGIES     is allergic to penicillins. FAMILY HISTORY     He indicated that his mother is . He indicated that his father is alive. He indicated that his brother is alive. He indicated that his daughter is alive. He indicated that both of his sons are alive. He indicated that the status of his paternal aunt is unknown. SOCIAL HISTORY       Social History     Tobacco Use    Smoking status: Never    Smokeless tobacco: Never   Vaping Use    Vaping Use: Never used   Substance Use Topics    Alcohol use: Not Currently     Comment: hx of abuse    Drug use:  No

## 2023-08-05 NOTE — ED NOTES
Patient presents to ED via EMS with complaint of toothache. Patient states he was seen at Urgent Care on Thursday for same and was placed on Clindamycin. Patient describes pain as throbbing. Patient ambulates to restroom with cane.        Leticia Colon RN  08/05/23 7765

## 2023-08-05 NOTE — ED NOTES
Patient provided ADA meal tray at this time. Patient updated on plan of care and pending transfer to [facility]. Patient denies nausea or further needs.      Bibi Escobar RN  08/05/23 7940

## 2023-08-05 NOTE — ED NOTES
Patient medicated per MAR at this time. Provided boxed lunch. Updated on ETA of transport. Denies any other needs at this time. Call light in reach.       Madeline Alejandre RN  08/05/23 0977

## 2023-08-14 ENCOUNTER — APPOINTMENT (OUTPATIENT)
Dept: PHARMACY | Age: 53
End: 2023-08-14
Payer: MEDICARE

## 2023-08-14 ENCOUNTER — TELEPHONE (OUTPATIENT)
Dept: PHARMACY | Age: 53
End: 2023-08-14

## 2023-08-14 NOTE — TELEPHONE ENCOUNTER
Patient called clinic and stated that he has not taken any Coumadin since 8/5/23 following multiple tooth extractions. Instructed patient to take Coumadin 10 mg x 3 doses (8/14/23-8/16/23), then Coumadin 7.5 mg x 1 dose (8/17/23). Next INR rescheduled for Friday 8/18/23 at 10:20 AM. Patient voiced understanding. Patient given instructions utilizing the teach back method.     For Pharmacy Admin Tracking Only    Intervention Detail: Dose Adjustment: 1, reason: Therapy Optimization  Total # of Interventions Recommended: 1  Total # of Interventions Accepted: 1  Time Spent (min): [address] [place] [address], PharmD, BCPS  8/14/2023  9:37 AM

## 2023-08-15 PROBLEM — Z86.718 HISTORY OF DVT (DEEP VEIN THROMBOSIS): Status: ACTIVE | Noted: 2023-08-15

## 2023-08-18 ENCOUNTER — HOSPITAL ENCOUNTER (OUTPATIENT)
Dept: PHARMACY | Age: 53
Setting detail: THERAPIES SERIES
Discharge: HOME | End: 2023-08-18
Payer: MEDICARE

## 2023-08-18 DIAGNOSIS — Z51.81 ENCOUNTER FOR THERAPEUTIC DRUG MONITORING: ICD-10-CM

## 2023-08-18 DIAGNOSIS — I48.91 ATRIAL FIBRILLATION, UNSPECIFIED TYPE (HCC): Primary | Chronic | ICD-10-CM

## 2023-08-18 DIAGNOSIS — Z79.01 ANTICOAGULATED ON COUMADIN: ICD-10-CM

## 2023-08-18 DIAGNOSIS — I26.99 PULMONARY EMBOLISM, UNSPECIFIED CHRONICITY, UNSPECIFIED PULMONARY EMBOLISM TYPE, UNSPECIFIED WHETHER ACUTE COR PULMONALE PRESENT (HCC): ICD-10-CM

## 2023-08-18 LAB — POC INR: 1.8 (ref 0.8–1.2)

## 2023-08-18 PROCEDURE — 36416 COLLJ CAPILLARY BLOOD SPEC: CPT | Performed by: PHARMACIST

## 2023-08-18 PROCEDURE — 99211 OFF/OP EST MAY X REQ PHY/QHP: CPT | Performed by: PHARMACIST

## 2023-08-18 PROCEDURE — 85610 PROTHROMBIN TIME: CPT | Performed by: PHARMACIST

## 2023-08-18 NOTE — PROGRESS NOTES
Medication Management [address] [place] [address]  [phone] (phone)  [phone] (fax)    Mr. Maria Eugenia Yanez is a 45 y.o.  male with history of PE, Afib who presents today for anticoagulation monitoring and adjustment. Patient verifies current dosing regimen and tablet strength. Coumadin held 8/5-8/10 while admitted at [facility].  Patient then held Coumadin 8/11-8/13, called [facility] on 8/14 for dosing instructions. Patient denies s/s bleeding/bruising/swelling/SOB  No blood in urine or stool. Decreased diet due to tooth extractions   No changes in medication/OTC agents/Herbals. Pt stopped taking lasix but is still taking KCl. Instructed patient to contact provider to review if he should still be taking KCl. No change in alcohol use or tobacco use. No change in activity level. Patient denies falls. No vomiting/diarrhea. Recently admitted at outside hospital, had all lower teeth removed. No Procedures scheduled in the future at this time. Assessment:   Lab Results   Component Value Date    INR 1.80 (H) 08/18/2023    INR 1.99 (H) 08/05/2023    INR 1.50 (H) 07/31/2023     INR subtherapeutic   Recent Labs     08/18/23  0952   INR 1.80*     Patient interview completed and discussed with pharmacist by Abdi Crawley, Pharm D Candidate,     Plan:  Continue Coumadin 5mg MWF and 7.5mg TThSaS. Recheck INR in 10 day(s). Patient reminded to call the Anticoagulation Clinic with any signs or symptoms of bleeding or with any medication changes. Patient given instructions utilizing the teach back method. After visit summary printed and reviewed with patient. Discharged ambulatory in no apparent distress.       For Pharmacy Admin Tracking Only  Time Spent (min): 20

## 2023-08-28 ENCOUNTER — APPOINTMENT (OUTPATIENT)
Dept: PHARMACY | Age: 53
End: 2023-08-28
Payer: MEDICARE

## 2023-08-28 DIAGNOSIS — I48.91 ATRIAL FIBRILLATION, UNSPECIFIED TYPE (HCC): Primary | Chronic | ICD-10-CM

## 2023-08-28 DIAGNOSIS — Z79.01 ANTICOAGULATED ON COUMADIN: ICD-10-CM

## 2023-08-28 DIAGNOSIS — Z51.81 ENCOUNTER FOR THERAPEUTIC DRUG MONITORING: ICD-10-CM

## 2023-08-28 DIAGNOSIS — I26.99 PULMONARY EMBOLISM, UNSPECIFIED CHRONICITY, UNSPECIFIED PULMONARY EMBOLISM TYPE, UNSPECIFIED WHETHER ACUTE COR PULMONALE PRESENT (HCC): ICD-10-CM

## 2023-08-28 LAB — POC INR: 4 (ref 0.8–1.2)

## 2023-08-28 PROCEDURE — 85610 PROTHROMBIN TIME: CPT

## 2023-08-28 PROCEDURE — 99212 OFFICE O/P EST SF 10 MIN: CPT

## 2023-08-28 PROCEDURE — 36416 COLLJ CAPILLARY BLOOD SPEC: CPT

## 2023-08-28 NOTE — PROGRESS NOTES
Medication Management [address] [place] [address]  [phone] (phone)  [phone] (fax)    Mr. Darcy Vo is a 45 y.o.  male with history of PE/Afib who presents today for anticoagulation monitoring and adjustment. Patient verifies current dosing regimen and tablet strength. Patient missed dose on  and took 10 mg (instead of 7.5 mg) on . Did not call the clinic. Patient denies s/s bleeding/bruising/swelling/SOB  No blood in urine or stool. Dietary changes. Eating soft foods due to recent tooth extraction   No changes in medication/OTC agents/Herbals. Will start trazodone . No change in alcohol use or tobacco use. No change in activity level. Slightly increased  Allie Gain  and hit his head. Went to the ED at [facility] and they did not find any internal bleeding. No vomiting/diarrhea or acute illness. No Procedures scheduled in the future at this time. Assessment:   Goal 2-3  Lab Results   Component Value Date    INR 4.00 (H) 2023    INR 1.80 (H) 2023    INR 1.99 (H) 2023     INR supratherapeutic   Recent Labs     23  1015   INR 4.00*     Patient interview completed and discussed with pharmacist by Cheyenne Jamison, Pharm D Candidate,     Plan:  Hold Coumadin today, then take 5 mg of Coumadin tomorrow, then continue Coumadin 5 mg MWF, 7.5 mg all other days. Recheck INR in 10 day(s). Patient reminded to call the Anticoagulation Clinic with any signs or symptoms of bleeding or with any medication changes. Patient given instructions utilizing the teach back method. After visit summary printed and reviewed with patient. Discharged ambulatory in no apparent distress.     For Pharmacy Admin Tracking Only    Intervention Detail: Adherence Monitorin and Dose Adjustment: 1, reason: Therapy Optimization  Total # of Interventions Recommended: 2  Total # of Interventions Accepted: 2  Time Spent (min): [place]

## 2023-09-01 RX ORDER — HYDRALAZINE HYDROCHLORIDE 50 MG/1
50 TABLET, FILM COATED ORAL DAILY
Qty: 30 TABLET | Refills: 0 | Status: SHIPPED | OUTPATIENT
Start: 2023-09-01

## 2023-09-01 RX ORDER — AMLODIPINE BESYLATE 10 MG/1
10 TABLET ORAL DAILY
Qty: 30 TABLET | Refills: 0 | Status: SHIPPED | OUTPATIENT
Start: 2023-09-01

## 2023-09-07 ENCOUNTER — HOSPITAL ENCOUNTER (OUTPATIENT)
Dept: PHARMACY | Age: 53
Setting detail: THERAPIES SERIES
Discharge: HOME | End: 2023-09-07
Payer: MEDICARE

## 2023-09-07 DIAGNOSIS — Z51.81 ENCOUNTER FOR THERAPEUTIC DRUG MONITORING: ICD-10-CM

## 2023-09-07 DIAGNOSIS — Z79.01 ANTICOAGULATED ON COUMADIN: ICD-10-CM

## 2023-09-07 DIAGNOSIS — I48.91 ATRIAL FIBRILLATION, UNSPECIFIED TYPE (HCC): Primary | Chronic | ICD-10-CM

## 2023-09-07 DIAGNOSIS — I26.99 PULMONARY EMBOLISM, UNSPECIFIED CHRONICITY, UNSPECIFIED PULMONARY EMBOLISM TYPE, UNSPECIFIED WHETHER ACUTE COR PULMONALE PRESENT (HCC): ICD-10-CM

## 2023-09-07 LAB — POC INR: 3.3 (ref 0.8–1.2)

## 2023-09-07 PROCEDURE — 85610 PROTHROMBIN TIME: CPT

## 2023-09-07 PROCEDURE — 36416 COLLJ CAPILLARY BLOOD SPEC: CPT

## 2023-09-07 PROCEDURE — 99212 OFFICE O/P EST SF 10 MIN: CPT

## 2023-09-07 NOTE — PROGRESS NOTES
[place] [facility]  [phone] (phone)  [phone] (fax)    Mr. Haydee Boyer is a 45 y.o.  male with history of PE, Afib who presents today for anticoagulation monitoring and adjustment. Patient verifies current dosing regimen and tablet strength. No missed or extra doses. Patient denies s/s bleeding/bruising/swelling/SOB- has been having tension headaches around the front of his forehead - has been taking Tylenol as needed  No blood in urine or stool. Some dietary changes. Collards greens, mustard greens once or twice a month. Drinks green tea ([employer]) 3-4 bottles a day  No changes in medication/OTC agents/Herbals. No change in alcohol use or tobacco use. Increase in activity level. Patient denies falls. No vomiting/diarrhea or acute illness. No Procedures scheduled in the future at this time. Assessment:   Lab Results   Component Value Date    INR 3.30 (H) 09/07/2023    INR 4.00 (H) 08/28/2023    INR 1.80 (H) 08/18/2023     INR supratherapeutic   Recent Labs     09/07/23  0943   INR 3.30*      Plan:  Coumadin 5 mg tonight then decrease Coumadin 7.5 mg MWF and 5 mg TuThSaSu. Recheck INR in 11 day(s). Patient reminded to call the Anticoagulation Clinic with signs or symptoms of bleeding or with any medication changes. Patient given instructions utilizing the teach back method. Plan reviewed by Resident Pharmacist [facility] with Pharmacist [address] Only    Intervention Detail: Dose Adjustment: 2, reason: Therapy Optimization  Total # of Interventions Recommended: 2  Total # of Interventions Accepted: 2  Time Spent (min): 20     After visit summary printed and reviewed with patient. Discharged ambulatory in no apparent distress.      [facility], PharmD  PGY1 Resident

## 2023-09-14 NOTE — PLAN OF CARE
9/14/23 INR 5.8 Taking 6mg Mondays and Fridays, 5mg all other days     Spoke w/ daughter Britany Bull, confirmed dosing. States that Naldomary Ramos was d/c from the hospital last week but has not had any med changes since then. Medication list shows Rx for Prednisone 40mg  x 3 days, unsure if this would be cause of elevated INR. Problem: MECHANICAL VENTILATION  Goal: Normal spontaneous ventilation  9/24/2020 1318 by Maycol Darling RCP  Outcome: Ongoing   Patient is continued on the ventilator and is not a candidate for weaning at this time.

## 2023-09-18 ENCOUNTER — APPOINTMENT (OUTPATIENT)
Dept: PHARMACY | Age: 53
End: 2023-09-18
Payer: MEDICARE

## 2023-09-18 DIAGNOSIS — Z79.01 ANTICOAGULATED ON COUMADIN: ICD-10-CM

## 2023-09-18 DIAGNOSIS — Z51.81 ENCOUNTER FOR THERAPEUTIC DRUG MONITORING: ICD-10-CM

## 2023-09-18 DIAGNOSIS — I48.91 ATRIAL FIBRILLATION, UNSPECIFIED TYPE (HCC): Primary | Chronic | ICD-10-CM

## 2023-09-18 DIAGNOSIS — I26.99 PULMONARY EMBOLISM, UNSPECIFIED CHRONICITY, UNSPECIFIED PULMONARY EMBOLISM TYPE, UNSPECIFIED WHETHER ACUTE COR PULMONALE PRESENT (HCC): ICD-10-CM

## 2023-09-18 LAB — POC INR: 2.8 (ref 0.8–1.2)

## 2023-09-18 PROCEDURE — 85610 PROTHROMBIN TIME: CPT | Performed by: PHARMACIST

## 2023-09-18 PROCEDURE — 36416 COLLJ CAPILLARY BLOOD SPEC: CPT | Performed by: PHARMACIST

## 2023-09-18 PROCEDURE — 99211 OFF/OP EST MAY X REQ PHY/QHP: CPT | Performed by: PHARMACIST

## 2023-09-18 RX ORDER — TRAZODONE HYDROCHLORIDE 50 MG/1
50 TABLET ORAL NIGHTLY
COMMUNITY

## 2023-09-18 NOTE — PROGRESS NOTES
Medication Management [facility]  [phone] (phone)  [phone] (fax)    Mr. Silvino Rothman is a 45 y.o.  male with history of DVT, PE, Afib who presents today for anticoagulation monitoring and adjustment. Patient verifies current dosing regimen and tablet strength. No missed or extra doses. Patient denies s/s bleeding/bruising/swelling/SOB. No blood in urine. Blood when he wipes after the bathroom, he thinks it is hemorroids (no hx) and plans to follow up with PCP. No dietary changes. No changes in OTC agents/Herbals. Started trazodone 50 mg nightly. Stopped hydralazine 50 mg QD. No change in alcohol use or tobacco use. No change in activity level. Getting more active after sleeping better with trazodone. No vomiting/diarrhea or acute illness. No Procedures scheduled in the future at this time. [employer] transport     Assessment:   Goal: 2.0-3.0    Lab Results   Component Value Date    INR 2.80 (H) 09/18/2023    INR 3.30 (H) 09/07/2023    INR 4.00 (H) 08/28/2023     INR therapeutic   Recent Labs     09/18/23  0939   INR 2.80*      Patient interview completed and discussed with pharmacist by Jossie Lowry PharmD Candidate     Plan:  Continue Coumadin 7.5 mg MWF, 5 mg TThSS. Recheck INR in 2 week(s). Patient reminded to call the Anticoagulation Clinic with any signs or symptoms of bleeding or with any medication changes. Patient given instructions utilizing the teach back method. After visit summary printed and reviewed with patient. Discharged ambulatory in no apparent distress.      For Pharmacy Admin Tracking Only    Time Spent (min): [facility], PharmD, BCPS  9/18/2023  10:00 AM

## 2023-09-25 RX ORDER — AMLODIPINE BESYLATE 10 MG/1
10 TABLET ORAL DAILY
Qty: 30 TABLET | Refills: 0 | OUTPATIENT
Start: 2023-09-25

## 2023-09-25 RX ORDER — HYDRALAZINE HYDROCHLORIDE 50 MG/1
50 TABLET, FILM COATED ORAL DAILY
Qty: 30 TABLET | Refills: 0 | OUTPATIENT
Start: 2023-09-25

## 2023-09-28 ENCOUNTER — HOSPITAL ENCOUNTER (OUTPATIENT)
Dept: GENERAL RADIOLOGY | Age: 53
Discharge: HOME | End: 2023-09-28
Payer: MEDICARE

## 2023-09-28 ENCOUNTER — HOSPITAL ENCOUNTER (OUTPATIENT)
Age: 53
Discharge: HOME | End: 2023-09-28
Payer: MEDICARE

## 2023-09-28 DIAGNOSIS — M25.551 RIGHT HIP PAIN: ICD-10-CM

## 2023-09-28 PROCEDURE — 73502 X-RAY EXAM HIP UNI 2-3 VIEWS: CPT

## 2023-10-02 ENCOUNTER — HOSPITAL ENCOUNTER (OUTPATIENT)
Dept: PHARMACY | Age: 53
Setting detail: THERAPIES SERIES
Discharge: HOME | End: 2023-10-02
Payer: MEDICARE

## 2023-10-02 DIAGNOSIS — Z79.01 ANTICOAGULATED ON COUMADIN: ICD-10-CM

## 2023-10-02 DIAGNOSIS — I26.99 PULMONARY EMBOLISM, UNSPECIFIED CHRONICITY, UNSPECIFIED PULMONARY EMBOLISM TYPE, UNSPECIFIED WHETHER ACUTE COR PULMONALE PRESENT (HCC): ICD-10-CM

## 2023-10-02 DIAGNOSIS — Z51.81 ENCOUNTER FOR THERAPEUTIC DRUG MONITORING: ICD-10-CM

## 2023-10-02 DIAGNOSIS — I48.91 ATRIAL FIBRILLATION, UNSPECIFIED TYPE (HCC): Primary | Chronic | ICD-10-CM

## 2023-10-02 LAB — POC INR: 2 (ref 0.8–1.2)

## 2023-10-02 PROCEDURE — 85610 PROTHROMBIN TIME: CPT | Performed by: PHARMACIST

## 2023-10-02 PROCEDURE — 36416 COLLJ CAPILLARY BLOOD SPEC: CPT | Performed by: PHARMACIST

## 2023-10-02 PROCEDURE — 99211 OFF/OP EST MAY X REQ PHY/QHP: CPT | Performed by: PHARMACIST

## 2023-10-02 NOTE — PROGRESS NOTES
Medication Management [address] [facility]  [phone] (phone)  [phone] (fax)    Mr. Gisel Bond is a 45 y.o.  male with history of PE, Afib who presents today for anticoagulation monitoring and adjustment. Patient verifies current dosing regimen and tablet strength. No missed or extra doses. Patient denies s/s bleeding/bruising/swelling/SOB  No blood in urine or stool. No dietary changes. No changes in medication/OTC agents/Herbals. No change in alcohol use or tobacco use. No change in activity level. Patient denies falls. No vomiting/diarrhea or acute illness. No Procedures scheduled in the future at this time. Assessment:   Lab Results   Component Value Date    INR 2.00 (H) 10/02/2023    INR 2.80 (H) 09/18/2023    INR 3.30 (H) 09/07/2023     INR therapeutic   Recent Labs     10/02/23  0923   INR 2.00*         Plan:  Continue Coumadin 7.5 mg MWF, 5 mg TThSS. Recheck INR in 4 week(s). Recommended 3 weeks, but patient requested 4. Patient reminded to call the Anticoagulation Clinic with any signs or symptoms of bleeding or with any medication changes. Patient given instructions utilizing the teach back method. After visit summary printed and reviewed with patient. Discharged ambulatory in no apparent distress.       For Pharmacy Admin Tracking Only    Time Spent (min): [address], PharmD, BCPS  10/2/2023  9:50 AM

## 2023-10-23 RX ORDER — WARFARIN SODIUM 5 MG/1
TABLET ORAL
Qty: 60 TABLET | Refills: 1 | Status: SHIPPED | OUTPATIENT
Start: 2023-10-23

## 2023-10-23 NOTE — TELEPHONE ENCOUNTER
Component Ref Range & Units 10/2/23 0923 9/18/23 0939 9/7/23 0943 8/28/23 1015 8/18/23 0952 7/31/23 0947 7/17/23 1019   POC INR 0.80 - 1.20 2.00 High   2.80 High  CM  3.30 High  CM  4.00 High  CM  1.80 High  CM  1.50

## 2023-10-28 RX ORDER — AMLODIPINE BESYLATE 10 MG/1
10 TABLET ORAL DAILY
Qty: 30 TABLET | Refills: 0 | Status: SHIPPED | OUTPATIENT
Start: 2023-10-28

## 2023-10-28 RX ORDER — HYDRALAZINE HYDROCHLORIDE 50 MG/1
50 TABLET, FILM COATED ORAL DAILY
Qty: 30 TABLET | Refills: 0 | Status: SHIPPED | OUTPATIENT
Start: 2023-10-28

## 2023-10-30 ENCOUNTER — APPOINTMENT (OUTPATIENT)
Dept: PHARMACY | Age: 53
End: 2023-10-30
Payer: MEDICARE

## 2023-10-30 DIAGNOSIS — Z51.81 ENCOUNTER FOR THERAPEUTIC DRUG MONITORING: ICD-10-CM

## 2023-10-30 DIAGNOSIS — I48.91 ATRIAL FIBRILLATION, UNSPECIFIED TYPE (HCC): Primary | Chronic | ICD-10-CM

## 2023-10-30 DIAGNOSIS — Z79.01 ANTICOAGULATED ON COUMADIN: ICD-10-CM

## 2023-10-30 DIAGNOSIS — I26.99 PULMONARY EMBOLISM, UNSPECIFIED CHRONICITY, UNSPECIFIED PULMONARY EMBOLISM TYPE, UNSPECIFIED WHETHER ACUTE COR PULMONALE PRESENT (HCC): ICD-10-CM

## 2023-10-30 LAB — POC INR: 1.7 (ref 0.8–1.2)

## 2023-10-30 PROCEDURE — 85610 PROTHROMBIN TIME: CPT | Performed by: PHARMACIST

## 2023-10-30 PROCEDURE — 36416 COLLJ CAPILLARY BLOOD SPEC: CPT | Performed by: PHARMACIST

## 2023-10-30 PROCEDURE — 99212 OFFICE O/P EST SF 10 MIN: CPT | Performed by: PHARMACIST

## 2023-10-30 NOTE — PROGRESS NOTES
Medication Management [address] [place] [address]  [phone] (phone)  [phone] (fax)    Mr. Ekta Waters is a 52 y.o.  male with history of PE, Afib who presents today for anticoagulation monitoring and adjustment. Patient verifies current dosing regimen and tablet strength. Missed dose Saturday, took 7.5 mg on Sunday  Patient denies s/s bleeding/bruising/swelling/SOB  No blood in urine or stool. Plans to eat collared greens this evening  No changes in medication/OTC agents/Herbals. No change in alcohol use or tobacco use. No change in activity level. Patient denies falls. No vomiting/diarrhea. Head cold for the past few days   No Procedures scheduled in the future at this time. Assessment:   Lab Results   Component Value Date    INR 1.70 (H) 10/30/2023    INR 2.00 (H) 10/02/2023    INR 2.80 (H) 09/18/2023     INR subtherapeutic   Recent Labs     10/30/23  0938   INR 1.70*      Patient interview completed and discussed with pharmacist by Jada TineoD candidate       Plan:  Coumadin 10mg today, then continue Coumadin 7.5mg MWF and 5mg TThSaS. Recheck INR in 2 week(s). Patient reminded to call the Anticoagulation Clinic with any signs or symptoms of bleeding or with any medication changes. Patient given instructions utilizing the teach back method. After visit summary printed and reviewed with patient. Discharged ambulatory in no apparent distress.      For Pharmacy Admin Tracking Only    Intervention Detail: Dose Adjustment: 1, reason: Therapy De-escalation  Total # of Interventions Recommended: 1  Total # of Interventions Accepted: 1  Time Spent (min): 20

## 2023-11-08 ENCOUNTER — OFFICE VISIT (OUTPATIENT)
Dept: CARDIOLOGY CLINIC | Age: 53
End: 2023-11-08
Payer: MEDICARE

## 2023-11-08 VITALS
DIASTOLIC BLOOD PRESSURE: 92 MMHG | BODY MASS INDEX: 43.94 KG/M2 | HEIGHT: 68 IN | HEART RATE: 71 BPM | SYSTOLIC BLOOD PRESSURE: 150 MMHG

## 2023-11-08 DIAGNOSIS — I89.0 LYMPHEDEMA: ICD-10-CM

## 2023-11-08 DIAGNOSIS — R60.0 BILATERAL LEG EDEMA: ICD-10-CM

## 2023-11-08 DIAGNOSIS — Z86.79 HISTORY OF ATRIAL FIBRILLATION: ICD-10-CM

## 2023-11-08 DIAGNOSIS — I50.32 CHRONIC HEART FAILURE WITH PRESERVED EJECTION FRACTION (HCC): Primary | Chronic | ICD-10-CM

## 2023-11-08 DIAGNOSIS — I35.0 AORTIC STENOSIS, MILD: Chronic | ICD-10-CM

## 2023-11-08 DIAGNOSIS — I10 HYPERTENSION, ESSENTIAL: Chronic | ICD-10-CM

## 2023-11-08 PROCEDURE — 3017F COLORECTAL CA SCREEN DOC REV: CPT | Performed by: INTERNAL MEDICINE

## 2023-11-08 PROCEDURE — G8484 FLU IMMUNIZE NO ADMIN: HCPCS | Performed by: INTERNAL MEDICINE

## 2023-11-08 PROCEDURE — 3077F SYST BP >= 140 MM HG: CPT | Performed by: INTERNAL MEDICINE

## 2023-11-08 PROCEDURE — 1036F TOBACCO NON-USER: CPT | Performed by: INTERNAL MEDICINE

## 2023-11-08 PROCEDURE — G8417 CALC BMI ABV UP PARAM F/U: HCPCS | Performed by: INTERNAL MEDICINE

## 2023-11-08 PROCEDURE — 3080F DIAST BP >= 90 MM HG: CPT | Performed by: INTERNAL MEDICINE

## 2023-11-08 PROCEDURE — 99214 OFFICE O/P EST MOD 30 MIN: CPT | Performed by: INTERNAL MEDICINE

## 2023-11-08 PROCEDURE — G8427 DOCREV CUR MEDS BY ELIG CLIN: HCPCS | Performed by: INTERNAL MEDICINE

## 2023-11-08 RX ORDER — ATORVASTATIN CALCIUM 40 MG/1
40 TABLET, FILM COATED ORAL NIGHTLY
Qty: 30 TABLET | Refills: 6 | Status: SHIPPED | OUTPATIENT
Start: 2023-11-08

## 2023-11-08 RX ORDER — FUROSEMIDE 20 MG/1
20 TABLET ORAL DAILY
Qty: 30 TABLET | Refills: 6 | Status: SHIPPED | OUTPATIENT
Start: 2023-11-08

## 2023-11-08 RX ORDER — AMLODIPINE BESYLATE 10 MG/1
10 TABLET ORAL DAILY
Qty: 30 TABLET | Refills: 6 | Status: SHIPPED | OUTPATIENT
Start: 2023-11-08

## 2023-11-08 RX ORDER — HYDRALAZINE HYDROCHLORIDE 50 MG/1
50 TABLET, FILM COATED ORAL 2 TIMES DAILY
Qty: 60 TABLET | Refills: 6 | Status: SHIPPED | OUTPATIENT
Start: 2023-11-08

## 2023-11-08 RX ORDER — POTASSIUM CHLORIDE 750 MG/1
10 TABLET, EXTENDED RELEASE ORAL 2 TIMES DAILY
Qty: 60 TABLET | Refills: 6 | Status: SHIPPED | OUTPATIENT
Start: 2023-11-08

## 2023-11-08 NOTE — PROGRESS NOTES
Chief Complaint   Patient presents with    Follow-up    Congestive Heart Failure   Originally patient establish cardiologist from [facility]    Hx of recent admission for PE and Left leg DVT and ssent home on coumadin and off apixaban   Was I=on apixaban for Hx of atr fib ( no ekg obtained to verify)  Was  teated in [facility]      8 months Follow up and  needs medication refilled. EKG done 8-4-2023.     Denied  Chest pain,  dizziness or palpitations  Non wt gain    DENIES CP.    leg edema +1 -chronic - better with lymphedema pump  SSEN IN WOUND CARE AND NOW IN [facility]  Sob on exertion chronic    nevere smoked    FHX  Mother had MI at her 52's      Past- 2012 had cp and abn nuc then and did not want cath that time    Patient Active Problem List   Diagnosis    History of Parox atrial fibrillation    Atrial fibrillation (HCC)    BPH (benign prostatic hyperplasia)    Carpal tunnel syndrome on right    Chronic gout    DM2 (diabetes mellitus, type 2) ([address])    Heart failure with preserved ejection fraction ([address])    History of alcohol abuse    History of osteomyelitis    Hypogonadism, male    Major depression    Morbid obesity ([address])    DURAN (nonalcoholic steatohepatitis)    KIARA treated with BiPAP    Vitamin D deficiency    Physical deconditioning    Mood disorder (HCC)    GERD (gastroesophageal reflux disease)    Primary osteoarthritis of left hip    Hypertension, essential    Dyslipidemia    Aortic stenosis, mild to moderate    Perirectal abscess    Elevated alkaline phosphatase level    Hx of Chest pain, atypical    Lumbar spondylosis    Lumbar facet arthropathy    Lumbar radiculitis    Spinal stenosis of lumbar region with neurogenic claudication    Ankylosing spondylitis (HCC)    Mid back pain    Pulmonary embolism (HCC)    Anticoagulated on Coumadin    Encounter for therapeutic drug monitoring    Bilateral leg edema +1 to +2     Lymphedema    Chronic venous stasis dermatitis of both lower extremities

## 2023-11-13 ENCOUNTER — HOSPITAL ENCOUNTER (OUTPATIENT)
Dept: PHARMACY | Age: 53
Setting detail: THERAPIES SERIES
Discharge: HOME | End: 2023-11-13
Payer: MEDICARE

## 2023-11-13 DIAGNOSIS — I26.99 PULMONARY EMBOLISM, UNSPECIFIED CHRONICITY, UNSPECIFIED PULMONARY EMBOLISM TYPE, UNSPECIFIED WHETHER ACUTE COR PULMONALE PRESENT (HCC): ICD-10-CM

## 2023-11-13 DIAGNOSIS — Z79.01 ANTICOAGULATED ON COUMADIN: ICD-10-CM

## 2023-11-13 DIAGNOSIS — I48.91 ATRIAL FIBRILLATION, UNSPECIFIED TYPE (HCC): Primary | Chronic | ICD-10-CM

## 2023-11-13 DIAGNOSIS — Z51.81 ENCOUNTER FOR THERAPEUTIC DRUG MONITORING: ICD-10-CM

## 2023-11-13 LAB — POC INR: 2.4 (ref 0.8–1.2)

## 2023-11-13 PROCEDURE — 85610 PROTHROMBIN TIME: CPT

## 2023-11-13 PROCEDURE — 99211 OFF/OP EST MAY X REQ PHY/QHP: CPT

## 2023-11-13 PROCEDURE — 36416 COLLJ CAPILLARY BLOOD SPEC: CPT

## 2023-11-13 RX ORDER — AMLODIPINE BESYLATE 10 MG/1
10 TABLET ORAL DAILY
Qty: 90 TABLET | Refills: 1 | Status: SHIPPED | OUTPATIENT
Start: 2023-11-13

## 2023-11-13 NOTE — PROGRESS NOTES
Medication Management [address] [place] [address]  [phone] (phone)  [phone] (fax)    Mr. Geetha Johnson is a 52 y.o.  male with history of PE, Afib who presents today for anticoagulation monitoring and adjustment. Patient verifies current dosing regimen and tablet strength. No missed or extra doses. Patient denies s/s bleeding/bruising/swelling/SOB  No blood in urine or stool. No dietary changes. No changes in medication/OTC agents/Herbals. No change in alcohol use or tobacco use. No change in activity level. Patient denies falls. No vomiting/diarrhea. Patient states he had a cold last week, but is feeling better today. No Procedures scheduled in the future at this time. Assessment:   Lab Results   Component Value Date    INR 2.40 (H) 11/13/2023    INR 1.70 (H) 10/30/2023    INR 2.00 (H) 10/02/2023     INR therapeutic   Recent Labs     11/13/23  0953   INR 2.40*     Patient interview completed and discussed with pharmacist by Grant Vasquez, NomanD Candidate. Patient has been therapeutic at three of the past four INR checks while on current regimen. Plan:  Continue Coumadin 7.5 mg MWF and 5 mg TuThSaSu. Recheck INR in 3 week(s). Patient reminded to call the Anticoagulation Clinic with any signs or symptoms of bleeding or with any medication changes. Patient given instructions utilizing the teach back method. After visit summary printed and reviewed with patient. Discharged via wheelchair in no apparent distress.     For Pharmacy Admin Tracking Only    Total # of Interventions Recommended: 0  Total # of Interventions Accepted: 0  Time Spent (min): [address], PharmD, [facility]  11/13/2023  10:05 AM

## 2023-11-16 RX ORDER — AMLODIPINE BESYLATE 10 MG/1
10 TABLET ORAL DAILY
Qty: 13 TABLET | OUTPATIENT
Start: 2023-11-16

## 2023-11-16 RX ORDER — HYDRALAZINE HYDROCHLORIDE 50 MG/1
50 TABLET, FILM COATED ORAL DAILY
Qty: 13 TABLET | OUTPATIENT
Start: 2023-11-16

## 2023-12-04 ENCOUNTER — HOSPITAL ENCOUNTER (OUTPATIENT)
Dept: PHARMACY | Age: 53
Setting detail: THERAPIES SERIES
Discharge: HOME | End: 2023-12-04
Payer: MEDICARE

## 2023-12-04 DIAGNOSIS — Z79.01 ANTICOAGULATED ON COUMADIN: ICD-10-CM

## 2023-12-04 DIAGNOSIS — Z51.81 ENCOUNTER FOR THERAPEUTIC DRUG MONITORING: ICD-10-CM

## 2023-12-04 DIAGNOSIS — Z86.718 HISTORY OF DVT (DEEP VEIN THROMBOSIS): ICD-10-CM

## 2023-12-04 DIAGNOSIS — I26.99 PULMONARY EMBOLISM, UNSPECIFIED CHRONICITY, UNSPECIFIED PULMONARY EMBOLISM TYPE, UNSPECIFIED WHETHER ACUTE COR PULMONALE PRESENT (HCC): ICD-10-CM

## 2023-12-04 DIAGNOSIS — I48.91 ATRIAL FIBRILLATION, UNSPECIFIED TYPE (HCC): Primary | Chronic | ICD-10-CM

## 2023-12-04 LAB — POC INR: 1.5 (ref 0.8–1.2)

## 2023-12-04 PROCEDURE — 99212 OFFICE O/P EST SF 10 MIN: CPT

## 2023-12-04 PROCEDURE — 85610 PROTHROMBIN TIME: CPT

## 2023-12-04 PROCEDURE — 36416 COLLJ CAPILLARY BLOOD SPEC: CPT

## 2023-12-04 RX ORDER — METRONIDAZOLE 500 MG/1
500 TABLET ORAL EVERY 8 HOURS
COMMUNITY
Start: 2023-11-25

## 2023-12-04 RX ORDER — DULOXETIN HYDROCHLORIDE 30 MG/1
CAPSULE, DELAYED RELEASE ORAL
COMMUNITY
Start: 2023-11-06

## 2023-12-04 RX ORDER — LINEZOLID 600 MG/1
600 TABLET, FILM COATED ORAL EVERY 12 HOURS
COMMUNITY
Start: 2023-11-27

## 2023-12-04 RX ORDER — GABAPENTIN 100 MG/1
CAPSULE ORAL
COMMUNITY
Start: 2023-11-25

## 2023-12-04 NOTE — PROGRESS NOTES
Only    Intervention Detail: Dose Adjustment: 1, reason: Therapy Optimization  Total # of Interventions Recommended: 1  Total # of Interventions Accepted: 1  Time Spent (min):  28

## 2023-12-14 ENCOUNTER — APPOINTMENT (OUTPATIENT)
Dept: PHARMACY | Age: 53
End: 2023-12-14
Payer: MEDICARE

## 2023-12-16 RX ORDER — AMLODIPINE BESYLATE 10 MG/1
10 TABLET ORAL DAILY
Qty: 90 TABLET | Refills: 3 | Status: SHIPPED | OUTPATIENT
Start: 2023-12-16

## 2023-12-16 RX ORDER — HYDRALAZINE HYDROCHLORIDE 50 MG/1
50 TABLET, FILM COATED ORAL 2 TIMES DAILY
Qty: 180 TABLET | Refills: 3 | Status: SHIPPED | OUTPATIENT
Start: 2023-12-16

## 2023-12-18 ENCOUNTER — APPOINTMENT (OUTPATIENT)
Dept: PHARMACY | Age: 53
End: 2023-12-18
Payer: MEDICARE

## 2023-12-18 NOTE — PROGRESS NOTES
Medication Management [address] [place] [address]  [phone] (phone)  [phone] (fax)    Mr. Geetha Johnson is a 52 y.o.  male with history of DVT, PE, Afib who presents today for anticoagulation monitoring and adjustment. Patient verifies current dosing regimen and tablet strength. No missed or extra doses. Patient denies s/s bleeding/bruising/swelling/SOB  No blood in urine or stool. No dietary changes. No changes in OTC agents/Herbals. Linezolid and metronidazole therapy completed about two weeks ago. No change in alcohol use or tobacco use. No change in activity level. Patient has been having difficulty with walking since September. Patient denies falls. No vomiting/diarrhea or acute illness. No Procedures scheduled in the future at this time. Patient falling asleep during visit, had to waken patient several times. Assessment:   Lab Results   Component Value Date    INR 1.90 (H) 12/18/2023    INR 1.50 (H) 12/04/2023    INR 2.40 (H) 11/13/2023     INR subtherapeutic   Recent Labs     12/18/23  1014   INR 1.90*     Patient interview completed and discussed with pharmacist by Maryellen Sandoval, pharmacy intern. Plan:  Coumadin 10mg today, then increase Coumadin to 5mg MWF and 7.5mg TThSaS. Recheck INR in 2 week(s). Patient reminded to call the Anticoagulation Clinic with any signs or symptoms of bleeding or with any medication changes. Patient given instructions utilizing the teach back method. After visit summary printed and reviewed with patient. Discharged in [facility] in no apparent distress.     For Pharmacy Admin Tracking Only    Intervention Detail: Dose Adjustment: 1, reason: Therapy Optimization  Total # of Interventions Recommended: 1  Total # of Interventions Accepted: 1  Time Spent (min): 20

## 2024-01-02 ENCOUNTER — TELEPHONE (OUTPATIENT)
Dept: PHARMACY | Age: 54
End: 2024-01-02

## 2024-01-02 RX ORDER — WARFARIN SODIUM 5 MG/1
TABLET ORAL
Qty: 60 TABLET | Refills: 1 | Status: SHIPPED | OUTPATIENT
Start: 2024-01-02

## 2024-01-02 NOTE — TELEPHONE ENCOUNTER
Refill sent to Shantell Fine PharmD 1/2/2024 11:18 AM    For Pharmacy Admin Tracking Only    Intervention Detail: Refill(s) Provided  Total # of Interventions Recommended: 1  Total # of Interventions Accepted: 1  Time Spent (min): 5

## 2024-01-09 ENCOUNTER — HOSPITAL ENCOUNTER (OUTPATIENT)
Dept: PHARMACY | Age: 54
Setting detail: THERAPIES SERIES
Discharge: HOME | End: 2024-01-09
Payer: MEDICARE

## 2024-01-09 DIAGNOSIS — Z79.01 ANTICOAGULATED ON COUMADIN: ICD-10-CM

## 2024-01-09 DIAGNOSIS — Z51.81 ENCOUNTER FOR THERAPEUTIC DRUG MONITORING: ICD-10-CM

## 2024-01-09 DIAGNOSIS — I48.91 ATRIAL FIBRILLATION, UNSPECIFIED TYPE (HCC): Primary | Chronic | ICD-10-CM

## 2024-01-09 DIAGNOSIS — Z86.718 HISTORY OF DVT (DEEP VEIN THROMBOSIS): ICD-10-CM

## 2024-01-09 DIAGNOSIS — I26.99 PULMONARY EMBOLISM, UNSPECIFIED CHRONICITY, UNSPECIFIED PULMONARY EMBOLISM TYPE, UNSPECIFIED WHETHER ACUTE COR PULMONALE PRESENT (HCC): ICD-10-CM

## 2024-01-09 LAB — POC INR: 2.7 (ref 0.8–1.2)

## 2024-01-09 PROCEDURE — 85610 PROTHROMBIN TIME: CPT

## 2024-01-09 PROCEDURE — 36416 COLLJ CAPILLARY BLOOD SPEC: CPT

## 2024-01-09 PROCEDURE — 99211 OFF/OP EST MAY X REQ PHY/QHP: CPT

## 2024-01-09 NOTE — PROGRESS NOTES
Medication Management Clinic  [facility]  Anticoagulation Clinic  [phone] (phone)  [phone] (fax)    Mr. Holger Aguilar is a 53 y.o.  male with history of PE/DVT/atrial fib., per referral from BHUPINDER Vasquez, who presents today for Warfarin monitoring and adjustment (1 week late for 2 week visit after increasing dose by 2.5 mg/week).    Patient verifies tablet strength. Followed printed instructions for dose.  No missed or extra doses, except for bolus ordered last visit.  Patient denies bruising.  Has usual SOB/left leg swelling. Sees a little blood on tissue after blowing nose; reminded of need to keep nasal membrane moist and how to do so.  No blood in urine or stool.  No dietary changes.  No changes in medication/OTC agents/herbals, except for using Nyquil liquid for a cold - advised to stop if has alcohol.  No change in alcohol use or tobacco use.  No change in activity level.  Patient denies falls.  No vomiting/diarrhea or acute illness.   No procedures scheduled in the future at this time.    Assessment:   Lab Results   Component Value Date    INR 2.70 (H) 01/09/2024    INR 1.90 (H) 12/18/2023    INR 1.50 (H) 12/04/2023     INR therapeutic - goal 2-3.  Recent Labs     01/09/24  0947   INR 2.70*        Plan:  POCT INR performed/result reviewed.  Continue PO Coumadin 5 mg MWF, 7.5 mg TThSS.  Recheck INR in 3 week(s).  Patient reminded to call the Anticoagulation Clinic with any signs or symptoms of bleeding or with any medication changes.  Patient given instructions utilizing the teach back method.       After visit summary printed and reviewed with patient.      Discharged via transport chair in no apparent distress to  to await transportation.    For Pharmacy Admin Tracking Only    Time Spent (min): 20

## 2024-01-17 RX ORDER — AMLODIPINE BESYLATE 10 MG/1
10 TABLET ORAL DAILY
Qty: 90 TABLET | Refills: 1 | Status: SHIPPED | OUTPATIENT
Start: 2024-01-17

## 2024-01-29 ENCOUNTER — APPOINTMENT (OUTPATIENT)
Dept: PHARMACY | Age: 54
End: 2024-01-29
Payer: MEDICARE

## 2024-01-29 DIAGNOSIS — I48.91 ATRIAL FIBRILLATION, UNSPECIFIED TYPE (HCC): Primary | Chronic | ICD-10-CM

## 2024-01-29 DIAGNOSIS — I26.99 PULMONARY EMBOLISM, UNSPECIFIED CHRONICITY, UNSPECIFIED PULMONARY EMBOLISM TYPE, UNSPECIFIED WHETHER ACUTE COR PULMONALE PRESENT (HCC): ICD-10-CM

## 2024-01-29 DIAGNOSIS — Z79.01 ANTICOAGULATED ON COUMADIN: ICD-10-CM

## 2024-01-29 DIAGNOSIS — Z86.718 HISTORY OF DVT (DEEP VEIN THROMBOSIS): ICD-10-CM

## 2024-01-29 DIAGNOSIS — Z51.81 ENCOUNTER FOR THERAPEUTIC DRUG MONITORING: ICD-10-CM

## 2024-01-29 LAB — POC INR: 2.4 (ref 0.8–1.2)

## 2024-01-29 PROCEDURE — 99211 OFF/OP EST MAY X REQ PHY/QHP: CPT

## 2024-01-29 PROCEDURE — 85610 PROTHROMBIN TIME: CPT

## 2024-01-29 PROCEDURE — 36416 COLLJ CAPILLARY BLOOD SPEC: CPT

## 2024-01-29 RX ORDER — METFORMIN HYDROCHLORIDE 500 MG/1
TABLET, EXTENDED RELEASE ORAL
COMMUNITY
Start: 2024-01-25

## 2024-01-29 RX ORDER — TIRZEPATIDE 2.5 MG/.5ML
INJECTION, SOLUTION SUBCUTANEOUS
COMMUNITY
Start: 2024-01-25

## 2024-01-29 NOTE — PROGRESS NOTES
with GARY Rosenbaum [facility] PharmD.)  Patient reminded to call the Anticoagulation Clinic with any signs or symptoms of bleeding or with any medication changes.  Patient given instructions utilizing the teach back method.       After visit summary printed and reviewed with patient.      Discharged via transport chair in no apparent distress, to  (four-wheeled walker there).    For Pharmacy Admin Tracking Only    Time Spent (min):  25

## 2024-02-19 ENCOUNTER — APPOINTMENT (OUTPATIENT)
Dept: PHARMACY | Age: 54
End: 2024-02-19
Payer: MEDICARE

## 2024-02-22 ENCOUNTER — HOSPITAL ENCOUNTER (OUTPATIENT)
Dept: PHARMACY | Age: 54
Setting detail: THERAPIES SERIES
Discharge: HOME | End: 2024-02-22
Payer: MEDICARE

## 2024-02-22 DIAGNOSIS — Z79.01 ANTICOAGULATED ON COUMADIN: ICD-10-CM

## 2024-02-22 DIAGNOSIS — Z86.718 HISTORY OF DVT (DEEP VEIN THROMBOSIS): ICD-10-CM

## 2024-02-22 DIAGNOSIS — Z51.81 ENCOUNTER FOR THERAPEUTIC DRUG MONITORING: ICD-10-CM

## 2024-02-22 DIAGNOSIS — I26.99 PULMONARY EMBOLISM, UNSPECIFIED CHRONICITY, UNSPECIFIED PULMONARY EMBOLISM TYPE, UNSPECIFIED WHETHER ACUTE COR PULMONALE PRESENT (HCC): ICD-10-CM

## 2024-02-22 DIAGNOSIS — I48.91 ATRIAL FIBRILLATION, UNSPECIFIED TYPE (HCC): Primary | Chronic | ICD-10-CM

## 2024-02-22 LAB — POC INR: 2.5 (ref 0.8–1.2)

## 2024-02-22 PROCEDURE — 36416 COLLJ CAPILLARY BLOOD SPEC: CPT

## 2024-02-22 PROCEDURE — 85610 PROTHROMBIN TIME: CPT

## 2024-02-22 PROCEDURE — 99212 OFFICE O/P EST SF 10 MIN: CPT

## 2024-02-22 RX ORDER — WARFARIN SODIUM 5 MG/1
TABLET ORAL EVERY EVENING
COMMUNITY

## 2024-02-22 RX ORDER — WARFARIN SODIUM 5 MG/1
TABLET ORAL
Qty: 135 TABLET | Refills: 1 | Status: SHIPPED | OUTPATIENT
Start: 2024-02-22

## 2024-02-22 RX ORDER — ACETAMINOPHEN 500 MG
500 TABLET ORAL EVERY 6 HOURS PRN
COMMUNITY

## 2024-02-22 NOTE — PROGRESS NOTES
Medication Management Clinic  [facility]  Anticoagulation Clinic  [phone] (phone)  [phone] (fax)    Mr. Holger Aguilar is a 53 y.o.  male with history of atrial fib./PE/DVT, per referral from BHUPINDER Vasquez, who presents today for Warfarin monitoring and adjustment (3.5 week visit - was originally scheduled for 2/19).    Patient verifies current  tablet strength. Right after last visit, went back to old dose of 7.5 mg MWF, 5 mg TThSS, instead of the opposite.  No missed or extra doses.  Patient denies bruising.  Has usual SOB/swelling of left leg. Has noticed some blood on tissue after blowing nose, but one time it continued to bleed.  Reminded of need to keep nasal membrane moist and how to do so.  No blood in urine or stool.  Dietary changes: even though discouraged at last visit, did start 1 glass of  Naked Mean Green juice every 2-3 days, plus about 4 ounces of pomegranate juice every 2-3 days. Still drinking turmeric dalton tea twice/week, but also vanilla dalton tea once every other week and dalton lemon tea once/week. Had Mighty Cesar juice last night.  Reminded would need INR checked sooner if varies any of these.   Changes in medication/OTC agents/herbals: shortly after last visit, switched MVI to Men's MVI - unsure if has Vitamin K. Now has ES Tylenol, not 325 mg. Did start Mounjaro shortly after last visit, then will be increased, he reports.  No change in alcohol use or tobacco use.  No change in activity level.  Patient denies falls.  No vomiting/diarrhea or acute illness.   No procedures scheduled in the future at this time.  Trying to loose weight - hasn't weighed, but clothes are looser.  Will be seeing eye doctor for glaucoma.    Assessment:   Lab Results   Component Value Date    INR 2.50 (H) 02/22/2024    INR 2.40 (H) 01/29/2024    INR 2.70 (H) 01/09/2024     INR therapeutic - goal 2-3.  Recent Labs     02/22/24  0944   INR 2.50*        Plan:  POCT INR

## 2024-03-14 ENCOUNTER — APPOINTMENT (OUTPATIENT)
Dept: PHARMACY | Age: 54
End: 2024-03-14
Payer: MEDICARE

## 2024-03-21 ENCOUNTER — HOSPITAL ENCOUNTER (OUTPATIENT)
Dept: PHARMACY | Age: 54
Setting detail: THERAPIES SERIES
Discharge: HOME | End: 2024-03-21
Payer: MEDICARE

## 2024-03-21 DIAGNOSIS — I26.99 PULMONARY EMBOLISM, UNSPECIFIED CHRONICITY, UNSPECIFIED PULMONARY EMBOLISM TYPE, UNSPECIFIED WHETHER ACUTE COR PULMONALE PRESENT (HCC): ICD-10-CM

## 2024-03-21 DIAGNOSIS — Z79.01 ANTICOAGULATED ON COUMADIN: ICD-10-CM

## 2024-03-21 DIAGNOSIS — Z86.718 HISTORY OF DVT (DEEP VEIN THROMBOSIS): ICD-10-CM

## 2024-03-21 DIAGNOSIS — Z51.81 ENCOUNTER FOR THERAPEUTIC DRUG MONITORING: ICD-10-CM

## 2024-03-21 DIAGNOSIS — I48.91 ATRIAL FIBRILLATION, UNSPECIFIED TYPE (HCC): Primary | Chronic | ICD-10-CM

## 2024-03-21 LAB — POC INR: 5 (ref 0.8–1.2)

## 2024-03-21 PROCEDURE — 36416 COLLJ CAPILLARY BLOOD SPEC: CPT

## 2024-03-21 PROCEDURE — 85610 PROTHROMBIN TIME: CPT

## 2024-03-21 PROCEDURE — 99212 OFFICE O/P EST SF 10 MIN: CPT

## 2024-03-21 NOTE — PROGRESS NOTES
Medication Management Clinic  [facility]  Anticoagulation Clinic  [phone] (phone)  [phone] (fax)    Mr. Holger Aguilar is a 53 y.o.  male with history of DVT, PE, Afib who presents today for anticoagulation monitoring and adjustment.    Patient verifies current dosing regimen and tablet strength.  No missed or extra doses.  Patient denies s/s bleeding/bruising/swelling/SOB  No blood in urine or stool.  Mounjaro just increased to 7.5 mg weekly.  States down 6 pounds in the past month  States not eating much since starting Mounjaro, appetite decreased  No change in alcohol use or tobacco use.  No change in activity level.  Patient denies falls.  No vomiting/diarrhea or acute illness.   Patient has procedure for Glaucoma at Dr. Bajwa office 4/26/24 and 4/29/24.  Called Dr. Bajwa office, patient does not need to hold Coumadin.    Assessment:   Lab Results   Component Value Date    INR 5.00 (H) 03/21/2024    INR 2.50 (H) 02/22/2024    INR 2.40 (H) 01/29/2024     INR supratherapeutic   Recent Labs     03/21/24  1010   INR 5.00*     Plan:  HOLD Coumadin today and tomorrow each. Then decrease Coumadin 7.5 mg Wed and 5 mg SuMoTuThFrSa (12% decrease).  Recheck INR in 1 week(s).  Patient reminded to call the Anticoagulation Clinic with signs or symptoms of bleeding or with any medication changes.  Patient given instructions utilizing the teach back method.    After visit summary printed and reviewed with patient.      Discharged ambulatory in no apparent distress.    For Pharmacy Admin Tracking Only    Intervention Detail: Dose Adjustment: 1, reason: Therapy Optimization  Total # of Interventions Recommended: 1  Total # of Interventions Accepted: 1  Time Spent (min): 20

## 2024-03-28 ENCOUNTER — HOSPITAL ENCOUNTER (OUTPATIENT)
Dept: PHARMACY | Age: 54
Setting detail: THERAPIES SERIES
Discharge: HOME | End: 2024-03-28
Payer: MEDICARE

## 2024-03-28 DIAGNOSIS — I26.99 PULMONARY EMBOLISM, UNSPECIFIED CHRONICITY, UNSPECIFIED PULMONARY EMBOLISM TYPE, UNSPECIFIED WHETHER ACUTE COR PULMONALE PRESENT (HCC): ICD-10-CM

## 2024-03-28 DIAGNOSIS — Z51.81 ENCOUNTER FOR THERAPEUTIC DRUG MONITORING: ICD-10-CM

## 2024-03-28 DIAGNOSIS — Z86.718 HISTORY OF DVT (DEEP VEIN THROMBOSIS): ICD-10-CM

## 2024-03-28 DIAGNOSIS — Z79.01 ANTICOAGULATED ON COUMADIN: ICD-10-CM

## 2024-03-28 DIAGNOSIS — I48.91 ATRIAL FIBRILLATION, UNSPECIFIED TYPE (HCC): Primary | Chronic | ICD-10-CM

## 2024-03-28 LAB — POC INR: 1.4 (ref 0.8–1.2)

## 2024-03-28 PROCEDURE — 85610 PROTHROMBIN TIME: CPT

## 2024-03-28 PROCEDURE — 99212 OFFICE O/P EST SF 10 MIN: CPT

## 2024-03-28 PROCEDURE — 36416 COLLJ CAPILLARY BLOOD SPEC: CPT

## 2024-03-28 NOTE — PROGRESS NOTES
Medication Management Clinic  [facility]  Anticoagulation Clinic  [phone] (phone)  [phone] (fax)    Mr. Holger Aguilar is a 53 y.o.  male with history of DVT, PE, Afib who presents today for anticoagulation monitoring and adjustment.    Patient verifies current dosing regimen and tablet strength.  No missed or extra doses.  Patient denies s/s bleeding/bruising/swelling/SOB  No blood in urine or stool.  No dietary changes.  States has not received Mounjaro 7.5 mg weekly dose yet. Still currently taking 5 mg weekly  Has lost about 10 pounds since starting the medication. Decreased appetite  No change in alcohol use or tobacco use.  No change in activity level.  Patient denies falls.  No vomiting/diarrhea or acute illness.   Patient has procedure for Glaucoma at Dr. Bajwa office 4/26/24 and 4/29/24. Does not need to hold Coumadin.     Assessment:   Lab Results   Component Value Date    INR 1.40 (H) 03/28/2024    INR 5.00 (H) 03/21/2024    INR 2.50 (H) 02/22/2024     INR subtherapeutic   Recent Labs     03/28/24  0958   INR 1.40*     Plan:  Take 10 mg today then continue Coumadin 7.5 mg Wed and 5 mg SuMoTuThFrSa.  Recheck INR in 1 week(s).  Patient reminded to call the Anticoagulation Clinic with any signs or symptoms of bleeding or with any medication changes.  Patient given instructions utilizing the teach back method.    After visit summary printed and reviewed with patient.      Discharged in transport chair in no apparent distress.    For Pharmacy Admin Tracking Only  Intervention Detail: Dose Adjustment: 1, reason: Therapy Optimization  Total # of Interventions Recommended: 1  Total # of Interventions Accepted: 1  Time Spent (min): 20

## 2024-04-04 ENCOUNTER — HOSPITAL ENCOUNTER (OUTPATIENT)
Dept: PHARMACY | Age: 54
Setting detail: THERAPIES SERIES
Discharge: HOME | End: 2024-04-04
Payer: MEDICARE

## 2024-04-04 DIAGNOSIS — Z51.81 ENCOUNTER FOR THERAPEUTIC DRUG MONITORING: ICD-10-CM

## 2024-04-04 DIAGNOSIS — Z86.718 HISTORY OF DVT (DEEP VEIN THROMBOSIS): ICD-10-CM

## 2024-04-04 DIAGNOSIS — I26.99 PULMONARY EMBOLISM, UNSPECIFIED CHRONICITY, UNSPECIFIED PULMONARY EMBOLISM TYPE, UNSPECIFIED WHETHER ACUTE COR PULMONALE PRESENT (HCC): ICD-10-CM

## 2024-04-04 DIAGNOSIS — I48.91 ATRIAL FIBRILLATION, UNSPECIFIED TYPE (HCC): Primary | Chronic | ICD-10-CM

## 2024-04-04 DIAGNOSIS — Z79.01 ANTICOAGULATED ON COUMADIN: ICD-10-CM

## 2024-04-04 LAB — POC INR: 3.2 (ref 0.8–1.2)

## 2024-04-04 PROCEDURE — 36416 COLLJ CAPILLARY BLOOD SPEC: CPT | Performed by: PHARMACIST

## 2024-04-04 PROCEDURE — 85610 PROTHROMBIN TIME: CPT | Performed by: PHARMACIST

## 2024-04-04 PROCEDURE — 99211 OFF/OP EST MAY X REQ PHY/QHP: CPT | Performed by: PHARMACIST

## 2024-04-04 NOTE — PROGRESS NOTES
Medication Management Clinic  [facility]  Anticoagulation Clinic  [phone] (phone)  [phone] (fax)    Mr. Holger Aguilar is a 53 y.o.  male with history of DVT, PE, Afib who presents today for anticoagulation monitoring and adjustment.    Patient verifies current dosing regimen and tablet strength.Patient did not decrease his dose as instructed by clinic   No missed or extra doses.  Patient denies s/s bleeding/bruising/swelling/SOB  No blood in urine or stool.  No dietary changes.  No changes in medication/OTC agents/Herbals.  No change in alcohol use or tobacco use.  No change in activity level.Patient has lost 10-12 pounds since starting Mounjaro  Patient denies falls.  No vomiting/diarrhea or acute illness.   No Procedures scheduled in the future at this time.    Assessment:   Lab Results   Component Value Date    INR 3.20 (H) 04/04/2024    INR 1.40 (H) 03/28/2024    INR 5.00 (H) 03/21/2024     INR supratherapeutic   Recent Labs     04/04/24  1030   INR 3.20*         Plan:  Coumadin 2.5 mg x 1 then decrease Coumadin 7.5 mg W, 5 mg MTThFSS.  Recheck INR in 2 week(s).  Patient reminded to call the Anticoagulation Clinic with signs or symptoms of bleeding or with any medication changes.  Patient given instructions utilizing the teach back method.        After visit summary printed and reviewed with patient.      Discharged ambulatory in no apparent distress.      For Pharmacy Admin Tracking Only    Intervention Detail: Dose Adjustment: 1, reason: Therapy De-escalation  Total # of Interventions Recommended: 1  Total # of Interventions Accepted: 1  Time Spent (min): 20

## 2024-04-18 ENCOUNTER — APPOINTMENT (OUTPATIENT)
Dept: PHARMACY | Age: 54
End: 2024-04-18
Payer: MEDICARE

## 2024-05-09 ENCOUNTER — HOSPITAL ENCOUNTER (OUTPATIENT)
Dept: PHARMACY | Age: 54
Setting detail: THERAPIES SERIES
Discharge: HOME | End: 2024-05-09
Payer: MEDICARE

## 2024-05-09 DIAGNOSIS — Z86.718 HISTORY OF DVT (DEEP VEIN THROMBOSIS): ICD-10-CM

## 2024-05-09 DIAGNOSIS — I48.91 ATRIAL FIBRILLATION, UNSPECIFIED TYPE (HCC): Primary | Chronic | ICD-10-CM

## 2024-05-09 DIAGNOSIS — I26.99 PULMONARY EMBOLISM, UNSPECIFIED CHRONICITY, UNSPECIFIED PULMONARY EMBOLISM TYPE, UNSPECIFIED WHETHER ACUTE COR PULMONALE PRESENT (HCC): ICD-10-CM

## 2024-05-09 DIAGNOSIS — Z79.01 ANTICOAGULATED ON COUMADIN: ICD-10-CM

## 2024-05-09 DIAGNOSIS — Z51.81 ENCOUNTER FOR THERAPEUTIC DRUG MONITORING: ICD-10-CM

## 2024-05-09 LAB — POC INR: 1.4 (ref 0.8–1.2)

## 2024-05-09 PROCEDURE — 85610 PROTHROMBIN TIME: CPT

## 2024-05-09 PROCEDURE — 99212 OFFICE O/P EST SF 10 MIN: CPT

## 2024-05-09 PROCEDURE — 36416 COLLJ CAPILLARY BLOOD SPEC: CPT

## 2024-05-09 NOTE — PROGRESS NOTES
Medication Management Clinic  [facility]  Anticoagulation Clinic  [phone] (phone)  [phone] (fax)    Mr. Holger Aguilar is a 53 y.o.  male with history of DVT, PE, Afib who presents today for anticoagulation monitoring and adjustment.    Patient verifies current dosing regimen and tablet strength.  No missed or extra doses.  Patient denies s/s bleeding/bruising/swelling/SOB  No blood in urine or stool.  States he has lost about 15 pounds since starting Mounjaro. Decreased appetite, eating less. States he has been eating more vegetables lately too including broccoli.  Reminded of Vitamin K content and consistency with diet. He would like to keep increased vegetables in his diet  States he started taking a multivitamin about 3 weeks ago.  No change in alcohol use or tobacco use.  No change in activity level.  Patient denies falls.  No vomiting/diarrhea or acute illness.   No Procedures scheduled in the future at this time.    Assessment:   Lab Results   Component Value Date    INR 1.40 (H) 05/09/2024    INR 3.20 (H) 04/04/2024    INR 1.40 (H) 03/28/2024     INR subtherapeutic   Recent Labs     05/09/24  0937   INR 1.40*       Plan:  Take 10 mg today then increase Coumadin 7.5 mg MoWeFr and 5 mg SuTuThSa.  Recheck INR in 2 week(s).   Patient reminded to call the Anticoagulation Clinic with signs or symptoms of bleeding or with any medication changes. Patient given instructions utilizing the teach back method.    After visit summary printed and reviewed with patient.      Discharged ambulatory in no apparent distress.    For Pharmacy Admin Tracking Only    Intervention Detail: Dose Adjustment: 1, reason: Therapy Optimization  Total # of Interventions Recommended: 1  Total # of Interventions Accepted: 1  Time Spent (min): 20

## 2024-05-21 ENCOUNTER — HOSPITAL ENCOUNTER (OUTPATIENT)
Dept: PHARMACY | Age: 54
Setting detail: THERAPIES SERIES
Discharge: HOME | End: 2024-05-21
Payer: MEDICARE

## 2024-05-21 DIAGNOSIS — Z51.81 ENCOUNTER FOR THERAPEUTIC DRUG MONITORING: ICD-10-CM

## 2024-05-21 DIAGNOSIS — I26.99 PULMONARY EMBOLISM, UNSPECIFIED CHRONICITY, UNSPECIFIED PULMONARY EMBOLISM TYPE, UNSPECIFIED WHETHER ACUTE COR PULMONALE PRESENT (HCC): ICD-10-CM

## 2024-05-21 DIAGNOSIS — Z79.01 ANTICOAGULATED ON COUMADIN: ICD-10-CM

## 2024-05-21 DIAGNOSIS — Z86.718 HISTORY OF DVT (DEEP VEIN THROMBOSIS): ICD-10-CM

## 2024-05-21 DIAGNOSIS — I48.91 ATRIAL FIBRILLATION, UNSPECIFIED TYPE (HCC): Primary | Chronic | ICD-10-CM

## 2024-05-21 LAB — POC INR: 1.8 (ref 0.8–1.2)

## 2024-05-21 PROCEDURE — 85610 PROTHROMBIN TIME: CPT

## 2024-05-21 PROCEDURE — 99212 OFFICE O/P EST SF 10 MIN: CPT

## 2024-05-21 PROCEDURE — 36416 COLLJ CAPILLARY BLOOD SPEC: CPT

## 2024-05-21 RX ORDER — TIRZEPATIDE 7.5 MG/.5ML
7.5 INJECTION, SOLUTION SUBCUTANEOUS WEEKLY
COMMUNITY

## 2024-05-21 NOTE — PROGRESS NOTES
Medication Management Clinic  [place]  Anticoagulation Clinic  [phone] (phone)  [phone] (fax)    Mr. Holger Aguilar is a 53 y.o.  male with history of DVT, PE, Afib who presents today for anticoagulation monitoring and adjustment.    Patient verifies current dosing regimen and tablet strength. Patient already took 7.5 mg this morning.  No missed or extra doses.  Patient denies s/s bleeding/bruising/swelling/SOB.  No blood in urine or stool.   Dietary changes.  - Still slowly increasing amount of cabbage, broccoli, brussel sprouts, Naked Juices  - Knows to try to remain consistent with vitamin K intake  No changes in OTC agents/Herbals.   - Stopped Lasix a few months ago  - Stopped Gabapentin a few months ago  - 14 lbs in past month with Mounjaro increase to 7.5 mg   - Remains on One-A-Day Mens 50+ vitamin daily (20 mcg vitamin K)  No change in alcohol use or tobacco use.   No change in activity level.   - Slowly increasing   Patient denies falls.   No vomiting/diarrhea or acute illness.   No Procedures scheduled in the future at this time.        Assessment:   Lab Results   Component Value Date    INR 1.80 (H) 05/21/2024    INR 1.40 (H) 05/09/2024    INR 3.20 (H) 04/04/2024     INR slightly subtherapeutic   Recent Labs     05/21/24  1438   INR 1.80*     Patient aware to take extra 2.5 mg (half tablet) today to make 10 mg total today.    Plan:  Take 10 mg total today (5/21), then increase Coumadin from 7.5 mg MoWeFr and 5 mg SuTuThSa to 5 mg MWF and 7.5 mg SuTuThSa (~6% increase). Recheck INR in 2 week(s).  Patient reminded to call the Anticoagulation Clinic with any signs or symptoms of bleeding or with any medication changes.  Patient given instructions utilizing the teach back method.        After visit summary printed and reviewed with patient.      Discharged ambulatory in no apparent distress.    Nikki Nicholas RPh  5/21/2024 3:01 PM     For Pharmacy Admin Tracking

## 2024-05-23 ENCOUNTER — APPOINTMENT (OUTPATIENT)
Dept: PHARMACY | Age: 54
End: 2024-05-23
Payer: MEDICARE

## 2024-06-04 ENCOUNTER — TELEPHONE (OUTPATIENT)
Dept: PHARMACY | Age: 54
End: 2024-06-04

## 2024-06-04 ENCOUNTER — HOSPITAL ENCOUNTER (OUTPATIENT)
Dept: PHARMACY | Age: 54
Setting detail: THERAPIES SERIES
Discharge: HOME | End: 2024-06-04
Payer: MEDICARE

## 2024-06-04 DIAGNOSIS — Z86.718 HISTORY OF DVT (DEEP VEIN THROMBOSIS): ICD-10-CM

## 2024-06-04 DIAGNOSIS — I26.99 PULMONARY EMBOLISM, UNSPECIFIED CHRONICITY, UNSPECIFIED PULMONARY EMBOLISM TYPE, UNSPECIFIED WHETHER ACUTE COR PULMONALE PRESENT (HCC): ICD-10-CM

## 2024-06-04 DIAGNOSIS — Z79.01 ANTICOAGULATED ON COUMADIN: ICD-10-CM

## 2024-06-04 DIAGNOSIS — Z51.81 ENCOUNTER FOR THERAPEUTIC DRUG MONITORING: ICD-10-CM

## 2024-06-04 DIAGNOSIS — I48.91 ATRIAL FIBRILLATION, UNSPECIFIED TYPE (HCC): Primary | Chronic | ICD-10-CM

## 2024-06-04 LAB — POC INR: 2.5 (ref 0.8–1.2)

## 2024-06-04 PROCEDURE — 85610 PROTHROMBIN TIME: CPT | Performed by: PHARMACIST

## 2024-06-04 PROCEDURE — 99211 OFF/OP EST MAY X REQ PHY/QHP: CPT | Performed by: PHARMACIST

## 2024-06-04 PROCEDURE — 36416 COLLJ CAPILLARY BLOOD SPEC: CPT | Performed by: PHARMACIST

## 2024-06-04 NOTE — PROGRESS NOTES
Medication Management Clinic  [facility]  Anticoagulation Clinic  [phone] (phone)  [phone] (fax)    Mr. Holger Aguilar is a 53 y.o.  male with history of PE, Afib who presents today for anticoagulation monitoring and adjustment.    Patient verifies current dosing regimen and tablet strength.  No missed or extra doses.  Patient denies s/s bleeding/bruising/swelling/SOB  No blood in urine or stool.  No dietary changes.  No changes in OTC agents/Herbals. New medication Zofran 4 mg q6h prn, Pt states he started a new medication yesterday for a stomach bug and takes it twice a day, Unsure of the name and dose. Instructed patient to contact clinic today with name of medication.  No change in alcohol use or tobacco use.  No change in activity level.  Patient denies falls.  No vomiting/diarrhea  Some vomiting and diarrhea due to stomach bug   No Procedures scheduled in the future at this time.    Assessment:   Lab Results   Component Value Date    INR 2.50 (H) 06/04/2024    INR 1.80 (H) 05/21/2024    INR 1.40 (H) 05/09/2024     INR therapeutic   Recent Labs     06/04/24  0846   INR 2.50*      Patient interview completed and discussed with pharmacist by Bret Rivas     Plan:  Continue Coumadin 5mg MWF and 7.5mg TThSaS.  Recheck INR in 2 week(s).  Patient reminded to call the Anticoagulation Clinic with any signs or symptoms of bleeding or with any medication changes.  Patient given instructions utilizing the teach back method.      After visit summary printed and reviewed with patient.      Discharged ambulatory in no apparent distress.    For Pharmacy Admin Tracking Only    Time Spent (min): 20

## 2024-06-04 NOTE — TELEPHONE ENCOUNTER
No interaction anticipated with coumadin, continue current regimen.  Called patient and instructed him to take the sucralfate on an empty stomach, 2 hours before any other medications.  Reviewed with Turdi Lema PharmD

## 2024-06-04 NOTE — TELEPHONE ENCOUNTER
Patient called and states he was started on Sucralfate 10 mg 1 tab 2 times daily.  Please call patient with Coumadin instructions

## 2024-06-17 ENCOUNTER — APPOINTMENT (OUTPATIENT)
Dept: PHARMACY | Age: 54
End: 2024-06-17
Payer: MEDICARE

## 2024-06-18 ENCOUNTER — HOSPITAL ENCOUNTER (OUTPATIENT)
Dept: PHARMACY | Age: 54
Setting detail: THERAPIES SERIES
Discharge: HOME | End: 2024-06-18
Payer: MEDICARE

## 2024-06-18 DIAGNOSIS — I48.91 ATRIAL FIBRILLATION, UNSPECIFIED TYPE (HCC): Primary | Chronic | ICD-10-CM

## 2024-06-18 DIAGNOSIS — I26.99 PULMONARY EMBOLISM, UNSPECIFIED CHRONICITY, UNSPECIFIED PULMONARY EMBOLISM TYPE, UNSPECIFIED WHETHER ACUTE COR PULMONALE PRESENT (HCC): ICD-10-CM

## 2024-06-18 DIAGNOSIS — Z86.718 HISTORY OF DVT (DEEP VEIN THROMBOSIS): ICD-10-CM

## 2024-06-18 DIAGNOSIS — Z51.81 ENCOUNTER FOR THERAPEUTIC DRUG MONITORING: ICD-10-CM

## 2024-06-18 DIAGNOSIS — Z79.01 ANTICOAGULATED ON COUMADIN: ICD-10-CM

## 2024-06-18 LAB — POC INR: 2.6 (ref 0.8–1.2)

## 2024-06-18 PROCEDURE — 85610 PROTHROMBIN TIME: CPT | Performed by: PHARMACIST

## 2024-06-18 PROCEDURE — 99211 OFF/OP EST MAY X REQ PHY/QHP: CPT | Performed by: PHARMACIST

## 2024-06-18 PROCEDURE — 36416 COLLJ CAPILLARY BLOOD SPEC: CPT | Performed by: PHARMACIST

## 2024-06-18 NOTE — PROGRESS NOTES
Medication Management Clinic  [facility]  Anticoagulation Clinic  [phone] (phone)  [phone] (fax)    Mr. Holger Aguilar is a 53 y.o.  male with history of DVT, PE, Afib who presents today for anticoagulation monitoring and adjustment.    Patient verifies current dosing regimen and tablet strength.  No missed or extra doses.   Patient denies s/s bleeding/bruising/swelling/SOB.   No blood in urine or stool.   No dietary changes.  No changes in medication/OTC agents/Herbals.  No change in alcohol use or tobacco use.  No change in activity level.  Patient denies falls.  No vomiting/diarrhea or acute illness.   No Procedures scheduled in the future at this time.    Assessment:   Goal INR: 2-3    Lab Results   Component Value Date    INR 2.60 (H) 06/18/2024    INR 2.50 (H) 06/04/2024    INR 1.80 (H) 05/21/2024     INR therapeutic   Recent Labs     06/18/24  0803   INR 2.60*     Patient interview completed and discussed with pharmacist by Yelena Carbajal, NomanD candidate.    Plan:  Continue Coumadin 5 mg MWF, 7.5 mg TThSS.  Recheck INR in 3 week(s).  Patient reminded to call the Anticoagulation Clinic with any signs or symptoms of bleeding or with any medication changes.  Patient given instructions utilizing the teach back method.        After visit summary printed and reviewed with patient.      Discharged ambulatory in no apparent distress.    For Pharmacy Admin Tracking Only  Time Spent (min): 20    Noman ChristiansonD, BCPS  6/18/2024  8:25 AM

## 2024-07-09 ENCOUNTER — HOSPITAL ENCOUNTER (OUTPATIENT)
Dept: PHARMACY | Age: 54
Setting detail: THERAPIES SERIES
Discharge: HOME | End: 2024-07-09
Payer: COMMERCIAL

## 2024-07-09 DIAGNOSIS — I48.91 ATRIAL FIBRILLATION, UNSPECIFIED TYPE (HCC): Primary | Chronic | ICD-10-CM

## 2024-07-09 DIAGNOSIS — Z79.01 ANTICOAGULATED ON COUMADIN: ICD-10-CM

## 2024-07-09 DIAGNOSIS — Z51.81 ENCOUNTER FOR THERAPEUTIC DRUG MONITORING: ICD-10-CM

## 2024-07-09 DIAGNOSIS — Z86.718 HISTORY OF DVT (DEEP VEIN THROMBOSIS): ICD-10-CM

## 2024-07-09 DIAGNOSIS — I26.99 PULMONARY EMBOLISM, UNSPECIFIED CHRONICITY, UNSPECIFIED PULMONARY EMBOLISM TYPE, UNSPECIFIED WHETHER ACUTE COR PULMONALE PRESENT (HCC): ICD-10-CM

## 2024-07-09 LAB — POC INR: 2.1 (ref 0.8–1.2)

## 2024-07-09 PROCEDURE — 36416 COLLJ CAPILLARY BLOOD SPEC: CPT

## 2024-07-09 PROCEDURE — 99211 OFF/OP EST MAY X REQ PHY/QHP: CPT

## 2024-07-09 PROCEDURE — 85610 PROTHROMBIN TIME: CPT

## 2024-07-09 RX ORDER — SUCRALFATE 1 G/1
1 TABLET ORAL 2 TIMES DAILY
COMMUNITY
Start: 2024-06-03

## 2024-07-09 RX ORDER — POLYETHYLENE GLYCOL 3350 17 G/17G
17 POWDER, FOR SOLUTION ORAL DAILY PRN
COMMUNITY

## 2024-07-09 RX ORDER — LATANOPROST 50 UG/ML
SOLUTION/ DROPS OPHTHALMIC
COMMUNITY
Start: 2024-05-14

## 2024-07-09 RX ORDER — TIMOLOL MALEATE 5 MG/ML
SOLUTION/ DROPS OPHTHALMIC
COMMUNITY
Start: 2024-06-11

## 2024-07-09 NOTE — PROGRESS NOTES
Medication Management Clinic  [facility]  Anticoagulation Clinic  [phone] (phone)  [phone] (fax)    Mr. Holger Aguilar is a 53 y.o.  male with history of PE/DVT/atrial fib., per referral from BHUPINDER Vasquez, who presents today for Warfarin monitoring and adjustment (3 week visit - late for today's visit).    Patient verifies current dosing regimen and tablet strength, except was taking 5 mg SunWF, 7.5 mg other days, instead of 5 mg MWF, 7.5 mg other days. He will follow schedule for proper days going forward.  No missed or extra doses.  Patient denies bleeding/bruising.  Has usual swelling of left leg/ankle/foot.  Having more SOB with activity - sometimes worse with high humidity.  He plans to contact PCP.  No blood in urine or stool.  No dietary changes.  No changes in medication/OTC agents/herbals. Pulled Miralax, Latanoprost, Carafate, Timolol onto list from \"[facility]\" - not new.  No change in alcohol use or tobacco use.  Change in activity level: walking more.  Patient denies falls.  No vomiting/diarrhea or acute illness.   No procedures scheduled in the future at this time.  States he lost 4# since last visit - total of 20# since starting Mounjaro 3 months ago.    Assessment:     Lab Results   Component Value Date    INR 2.10 (H) 07/09/2024    INR 2.60 (H) 06/18/2024    INR 2.50 (H) 06/04/2024     INR therapeutic - goal 2-3.  Recent Labs     07/09/24  0822   INR 2.10*      Plan:  POCT INR performed/result reviewed.  Continue PO Coumadin 5 mg MWF, 7.5 mg TThSS.  Recheck INR in 4 week(s).  Patient reminded to call the Anticoagulation Clinic with any signs or symptoms of bleeding or with any medication changes.  Patient given instructions utilizing the teach back method.       After visit summary printed and reviewed with patient.      Discharged via transport chair in no apparent distress to  where his Rollator (?) is.    For Pharmacy Admin Tracking Only    Time

## 2024-08-05 DIAGNOSIS — I26.99 PULMONARY EMBOLISM, UNSPECIFIED CHRONICITY, UNSPECIFIED PULMONARY EMBOLISM TYPE, UNSPECIFIED WHETHER ACUTE COR PULMONALE PRESENT (HCC): ICD-10-CM

## 2024-08-05 DIAGNOSIS — Z79.01 ANTICOAGULATED ON COUMADIN: Primary | ICD-10-CM

## 2024-08-05 DIAGNOSIS — Z86.718 HISTORY OF DVT (DEEP VEIN THROMBOSIS): ICD-10-CM

## 2024-08-05 DIAGNOSIS — I48.91 ATRIAL FIBRILLATION, UNSPECIFIED TYPE (HCC): Chronic | ICD-10-CM

## 2024-08-06 ENCOUNTER — ANTI-COAG VISIT (OUTPATIENT)
Age: 54
End: 2024-08-06
Payer: COMMERCIAL

## 2024-08-06 DIAGNOSIS — Z79.01 ANTICOAGULATED ON COUMADIN: ICD-10-CM

## 2024-08-06 DIAGNOSIS — Z86.718 HISTORY OF DVT (DEEP VEIN THROMBOSIS): ICD-10-CM

## 2024-08-06 DIAGNOSIS — Z51.81 ENCOUNTER FOR THERAPEUTIC DRUG MONITORING: ICD-10-CM

## 2024-08-06 DIAGNOSIS — I26.99 PULMONARY EMBOLISM, UNSPECIFIED CHRONICITY, UNSPECIFIED PULMONARY EMBOLISM TYPE, UNSPECIFIED WHETHER ACUTE COR PULMONALE PRESENT (HCC): ICD-10-CM

## 2024-08-06 DIAGNOSIS — I48.91 ATRIAL FIBRILLATION, UNSPECIFIED TYPE (HCC): Primary | Chronic | ICD-10-CM

## 2024-08-06 LAB — POC INR: 2.8 (ref 0.8–1.2)

## 2024-08-06 PROCEDURE — 85610 PROTHROMBIN TIME: CPT

## 2024-08-06 PROCEDURE — 99211 OFF/OP EST MAY X REQ PHY/QHP: CPT

## 2024-08-06 NOTE — PROGRESS NOTES
Medication Management Clinic  [facility]  Anticoagulation Clinic  [phone] (phone)  [phone] (fax)    Mr. Holger Aguilar is a 53 y.o.  male with history of DVT, PE, Afib who presents today for anticoagulation monitoring and adjustment.    Patient verifies current dosing regimen and tablet strength.  No missed or extra doses.  Patient denies s/s bleeding/bruising/swelling  - Increased SOB at work, this is unchanged from last appt   No blood in urine or stool.  No dietary changes.  No changes in medication/OTC agents/Herbals.  No change in alcohol use or tobacco use.  No change in activity level.  Patient denies falls.  No vomiting/diarrhea or acute illness.   No Procedures scheduled in the future at this time.    Assessment:   Lab Results   Component Value Date    INR 2.80 (H) 08/06/2024    INR 2.10 (H) 07/09/2024    INR 2.60 (H) 06/18/2024     INR therapeutic   Recent Labs     08/06/24  0946   INR 2.80*      Patient presents today with fourth consecutive therapeutic INR on current regimen     Plan:  Continue Coumadin 5 mg M,W,F and 7.5 mg all other days.  Recheck INR in 4 week(s).  Patient reminded to call the Anticoagulation Clinic with any signs or symptoms of bleeding or with any medication changes.  Patient given instructions utilizing the teach back method.       After visit summary printed and reviewed with patient.      Discharged ambulatory in no apparent distress.    For Pharmacy Admin Tracking Only    Time Spent (min): 20     Noman ChengD  PGY1 Pharmacy Resident  8/6/2024 10:03 AM

## 2024-08-12 ENCOUNTER — OFFICE VISIT (OUTPATIENT)
Dept: CARDIOLOGY CLINIC | Age: 54
End: 2024-08-12
Payer: COMMERCIAL

## 2024-08-12 VITALS
SYSTOLIC BLOOD PRESSURE: 126 MMHG | WEIGHT: 288.4 LBS | HEART RATE: 69 BPM | DIASTOLIC BLOOD PRESSURE: 79 MMHG | BODY MASS INDEX: 43.71 KG/M2 | HEIGHT: 68 IN

## 2024-08-12 DIAGNOSIS — I50.32 CHRONIC HEART FAILURE WITH PRESERVED EJECTION FRACTION (HCC): ICD-10-CM

## 2024-08-12 DIAGNOSIS — I10 HYPERTENSION, ESSENTIAL: Primary | ICD-10-CM

## 2024-08-12 DIAGNOSIS — Z86.79 HISTORY OF ATRIAL FIBRILLATION: ICD-10-CM

## 2024-08-12 PROCEDURE — 99214 OFFICE O/P EST MOD 30 MIN: CPT | Performed by: PHYSICIAN ASSISTANT

## 2024-08-12 PROCEDURE — 3078F DIAST BP <80 MM HG: CPT | Performed by: PHYSICIAN ASSISTANT

## 2024-08-12 PROCEDURE — 3074F SYST BP LT 130 MM HG: CPT | Performed by: PHYSICIAN ASSISTANT

## 2024-08-12 PROCEDURE — 93000 ELECTROCARDIOGRAM COMPLETE: CPT | Performed by: PHYSICIAN ASSISTANT

## 2024-08-12 NOTE — PROGRESS NOTES
(nonalcoholic steatohepatitis)     KIARA treated with BiPAP     Pulmonary emboli (HCC)     Vitamin D deficiency        Allergies   Allergen Reactions    Penicillins      Tolerated Zosyn 9/24/2020       Current Outpatient Medications   Medication Sig Dispense Refill    polyethylene glycol (GLYCOLAX) 17 g packet Take 1 packet by mouth daily as needed for Constipation      latanoprost (XALATAN) 0.005 % ophthalmic solution place 1 drop into both eyes at bedtime      timolol (TIMOPTIC) 0.5 % ophthalmic solution instill 1 drop into both eyes every morning      sucralfate (CARAFATE) 1 GM tablet Take 1 tablet by mouth 2 times daily      Tirzepatide (MOUNJARO) 7.5 MG/0.5ML SOPN SC injection Inject 0.5 mLs into the skin once a week      Multiple Vitamins-Minerals (MENS MULTIVITAMIN PO) Take 1 tablet by mouth Daily      warfarin (COUMADIN) 5 MG tablet TAKE AS DIRECTED BY [facility] MEDICATION MANAGEMENT CLINIC 135 Tabs = 90 Day supply 135 tablet 1    metFORMIN (GLUCOPHAGE-XR) 500 MG extended release tablet 1 tablet 4 times daily      amLODIPine (NORVASC) 10 MG tablet TAKE 1 TABLET BY MOUTH DAILY 90 tablet 1    hydrALAZINE (APRESOLINE) 50 MG tablet Take 1 tablet by mouth 2 times daily 180 tablet 3    MAGNESIUM PO Take 1 tablet by mouth Daily      DULoxetine (CYMBALTA) 30 MG extended release capsule Take 1 capsule by mouth daily      potassium chloride (KLOR-CON M) 10 MEQ extended release tablet Take 1 tablet by mouth 2 times daily 60 tablet 6    furosemide (LASIX) 20 MG tablet Take 1 tablet by mouth daily 30 tablet 6    atorvastatin (LIPITOR) 40 MG tablet Take 1 tablet by mouth nightly 30 tablet 6    traZODone (DESYREL) 50 MG tablet Take 1 tablet by mouth nightly      FLOVENT  MCG/ACT inhaler 2 puffs 2 times daily      pantoprazole (PROTONIX) 40 MG tablet Take by mouth daily      oxyCODONE-acetaminophen (PERCOCET) 5-325 MG per tablet Take 1 tablet by mouth every 6 hours as needed for Pain.      ammonium lactate (LAC-HYDRIN)

## 2024-08-19 LAB
BASOPHILS ABSOLUTE: NORMAL
BASOPHILS RELATIVE PERCENT: NORMAL
EOSINOPHILS ABSOLUTE: NORMAL
EOSINOPHILS RELATIVE PERCENT: NORMAL
HCT VFR BLD CALC: 46.4 % (ref 41–53)
HEMOGLOBIN: 15.3 G/DL (ref 13.5–17.5)
LYMPHOCYTES ABSOLUTE: NORMAL
LYMPHOCYTES RELATIVE PERCENT: NORMAL
MCH RBC QN AUTO: NORMAL PG
MCHC RBC AUTO-ENTMCNC: NORMAL G/DL
MCV RBC AUTO: NORMAL FL
MONOCYTES ABSOLUTE: NORMAL
MONOCYTES RELATIVE PERCENT: NORMAL
NEUTROPHILS ABSOLUTE: NORMAL
NEUTROPHILS RELATIVE PERCENT: NORMAL
PLATELET # BLD: 272 K/ΜL
PMV BLD AUTO: NORMAL FL
RBC # BLD: 5.13 10^6/ΜL
WBC # BLD: 7.6 10^3/ML

## 2024-08-27 ENCOUNTER — HOSPITAL ENCOUNTER (OUTPATIENT)
Dept: GENERAL RADIOLOGY | Age: 54
Discharge: HOME | End: 2024-08-27
Payer: COMMERCIAL

## 2024-08-27 ENCOUNTER — HOSPITAL ENCOUNTER (OUTPATIENT)
Age: 54
Discharge: HOME | End: 2024-08-27
Payer: COMMERCIAL

## 2024-08-27 DIAGNOSIS — I27.82 CHRONIC PULMONARY THROMBOEMBOLISM SYNDROME (HCC): ICD-10-CM

## 2024-08-27 PROCEDURE — 71046 X-RAY EXAM CHEST 2 VIEWS: CPT

## 2024-09-03 ENCOUNTER — APPOINTMENT (OUTPATIENT)
Age: 54
End: 2024-09-03
Payer: COMMERCIAL

## 2024-09-09 ENCOUNTER — OFFICE VISIT (OUTPATIENT)
Dept: PULMONOLOGY | Age: 54
End: 2024-09-09
Payer: COMMERCIAL

## 2024-09-09 VITALS
HEART RATE: 83 BPM | OXYGEN SATURATION: 96 % | TEMPERATURE: 98 F | WEIGHT: 290.2 LBS | BODY MASS INDEX: 43.98 KG/M2 | HEIGHT: 68 IN | DIASTOLIC BLOOD PRESSURE: 78 MMHG | SYSTOLIC BLOOD PRESSURE: 150 MMHG

## 2024-09-09 DIAGNOSIS — R76.8 RHEUMATOID FACTOR POSITIVE: ICD-10-CM

## 2024-09-09 DIAGNOSIS — J98.4 RESTRICTIVE LUNG DISEASE: ICD-10-CM

## 2024-09-09 DIAGNOSIS — R06.02 SOB (SHORTNESS OF BREATH) ON EXERTION: ICD-10-CM

## 2024-09-09 DIAGNOSIS — R06.2 WHEEZING: ICD-10-CM

## 2024-09-09 DIAGNOSIS — J84.9 ILD (INTERSTITIAL LUNG DISEASE) (HCC): Primary | ICD-10-CM

## 2024-09-09 PROCEDURE — 3078F DIAST BP <80 MM HG: CPT

## 2024-09-09 PROCEDURE — 94618 PULMONARY STRESS TESTING: CPT

## 2024-09-09 PROCEDURE — 99215 OFFICE O/P EST HI 40 MIN: CPT

## 2024-09-09 PROCEDURE — 3077F SYST BP >= 140 MM HG: CPT

## 2024-09-09 RX ORDER — PREDNISONE 20 MG/1
40 TABLET ORAL DAILY
Qty: 10 TABLET | Refills: 0 | Status: SHIPPED | OUTPATIENT
Start: 2024-09-09 | End: 2024-09-14

## 2024-09-09 ASSESSMENT — ENCOUNTER SYMPTOMS
COUGH: 1
SINUS PRESSURE: 0
CHEST TIGHTNESS: 0
SINUS PAIN: 0
RHINORRHEA: 0
WHEEZING: 1
SHORTNESS OF BREATH: 1

## 2024-09-10 ENCOUNTER — ANTI-COAG VISIT (OUTPATIENT)
Age: 54
End: 2024-09-10
Payer: COMMERCIAL

## 2024-09-10 DIAGNOSIS — I26.99 PULMONARY EMBOLISM, UNSPECIFIED CHRONICITY, UNSPECIFIED PULMONARY EMBOLISM TYPE, UNSPECIFIED WHETHER ACUTE COR PULMONALE PRESENT (HCC): ICD-10-CM

## 2024-09-10 DIAGNOSIS — Z86.718 HISTORY OF DVT (DEEP VEIN THROMBOSIS): ICD-10-CM

## 2024-09-10 DIAGNOSIS — Z51.81 ENCOUNTER FOR THERAPEUTIC DRUG MONITORING: ICD-10-CM

## 2024-09-10 DIAGNOSIS — Z79.01 ANTICOAGULATED ON COUMADIN: ICD-10-CM

## 2024-09-10 DIAGNOSIS — I48.91 ATRIAL FIBRILLATION, UNSPECIFIED TYPE (HCC): Primary | Chronic | ICD-10-CM

## 2024-09-10 LAB — POC INR: 2.1 (ref 0.8–1.2)

## 2024-09-10 PROCEDURE — 85610 PROTHROMBIN TIME: CPT

## 2024-09-10 PROCEDURE — 99212 OFFICE O/P EST SF 10 MIN: CPT

## 2024-09-10 RX ORDER — UMECLIDINIUM BROMIDE AND VILANTEROL TRIFENATATE 62.5; 25 UG/1; UG/1
1 POWDER RESPIRATORY (INHALATION) DAILY
COMMUNITY
Start: 2024-08-14

## 2024-09-10 RX ORDER — GLIPIZIDE 10 MG/1
10 TABLET, FILM COATED, EXTENDED RELEASE ORAL DAILY
COMMUNITY
Start: 2024-08-19

## 2024-09-18 ENCOUNTER — TELEPHONE (OUTPATIENT)
Dept: PULMONOLOGY | Age: 54
End: 2024-09-18

## 2024-09-18 ENCOUNTER — ANTI-COAG VISIT (OUTPATIENT)
Age: 54
End: 2024-09-18
Payer: COMMERCIAL

## 2024-09-18 DIAGNOSIS — I48.91 ATRIAL FIBRILLATION, UNSPECIFIED TYPE (HCC): Primary | Chronic | ICD-10-CM

## 2024-09-18 DIAGNOSIS — Z51.81 ENCOUNTER FOR THERAPEUTIC DRUG MONITORING: ICD-10-CM

## 2024-09-18 DIAGNOSIS — Z79.01 ANTICOAGULATED ON COUMADIN: ICD-10-CM

## 2024-09-18 DIAGNOSIS — Z86.718 HISTORY OF DVT (DEEP VEIN THROMBOSIS): ICD-10-CM

## 2024-09-18 DIAGNOSIS — I26.99 PULMONARY EMBOLISM, UNSPECIFIED CHRONICITY, UNSPECIFIED PULMONARY EMBOLISM TYPE, UNSPECIFIED WHETHER ACUTE COR PULMONALE PRESENT (HCC): ICD-10-CM

## 2024-09-18 LAB — POC INR: 2 (ref 0.8–1.2)

## 2024-09-18 PROCEDURE — 85610 PROTHROMBIN TIME: CPT

## 2024-09-18 PROCEDURE — 99212 OFFICE O/P EST SF 10 MIN: CPT

## 2024-09-18 RX ORDER — WARFARIN SODIUM 5 MG/1
TABLET ORAL
Qty: 135 TABLET | Refills: 1 | Status: SHIPPED | OUTPATIENT
Start: 2024-09-18

## 2024-09-18 RX ORDER — POTASSIUM CHLORIDE 750 MG/1
TABLET, EXTENDED RELEASE ORAL
COMMUNITY
Start: 2024-09-16 | End: 2024-09-18

## 2024-09-19 RX ORDER — HYDRALAZINE HYDROCHLORIDE 50 MG/1
50 TABLET, FILM COATED ORAL 2 TIMES DAILY
Qty: 180 TABLET | Refills: 1 | Status: SHIPPED | OUTPATIENT
Start: 2024-09-19

## 2024-09-19 RX ORDER — AMLODIPINE BESYLATE 10 MG/1
10 TABLET ORAL DAILY
Qty: 90 TABLET | Refills: 1 | Status: SHIPPED | OUTPATIENT
Start: 2024-09-19

## 2024-10-01 NOTE — PROGRESS NOTES
Comprehensive Nutrition Assessment    Type and Reason for Visit:  Reassess    Nutrition Recommendations/Plan:   Continue current TPN macronutrients, no changes tonight. Diet per SLP and GI. Nutrition Assessment:    Pt improving from a nutritional standpoint AEB TPN being provided for nutrition support while NPO due to dysphagia and GI concerns, currently meeting estimated macronutrient needs. Remains at risk for further nutritional compromise r/t inability to place NGT due to anatomical reasons and behavioral issues, report that family previously declined PEG and trach if needed, catabolic illness, increased nutrient needs to support wound healing, lengthy hospitalization due to positive covid-19, acute respiratory failure, intubated-extubated 10/15/20, sepsis, pneumonia, KRISTY, anemia, GI bleed (large clot/ulceration at site of rectum), deconditioning, and underlying medical condition (history of obesity, DM, GERD, alcohol abuse, HLD, HTN, vitamin D deficiency), and need for nutrition support.  Nutrition recommendations/interventions as per above    Malnutrition Assessment:  Malnutrition Status: At risk for malnutrition (Comment)(no nutrition in ~5 days)    Context:  Acute Illness     Findings of the 6 clinical characteristics of malnutrition:  Energy Intake: variable since admit, hard to assess, nutrition support off and on  Weight Loss:  Unable to assess(weight fluctuations with edema)     Body Fat Loss:  No significant body fat loss     Muscle Mass Loss:  No significant muscle mass loss    Fluid Accumulation:  (+1 and +2 edema) Extremities   Strength:  Not Performed    Estimated Daily Nutrient Needs:  Energy (kcal):  6506-2411 (30-32 kcals/kg IBW in active late phase);  Weight Used for Energy Requirements:  Ideal(70 kg - ideal weight)     Protein (g):  ~140 gms (2/kgm IBW) as renal status allows; Weight Used for Protein Requirements:  Ideal(70 kg)        Fluid (ml/day):  per MD; Method Used for Fluid Wound Care Center Physician Orders and Discharge Instructions  [facility]   [address] [place]  Telephone: [phone] FAX [phone]    NAME:  Serafin Weaver  YOB: 1980  MEDICAL RECORD NUMBER:  [id number]  DATE: 10/1/2024    Return Appointment:  Return Appointment: With Kailee Olsen DPM  in  1 Week(s).  Schedule weekly for the rest of the month? Yes    Future Appointments   Date Time Provider Department Center   10/8/2024  1:45 PM Kailee Olsen DPM TJ WOUND [facility]      **PLEASE ASSESS BED THAT FOOT IS NOT RESTING AGAINST BED BOARD; PLEASE ADD FOOT BOARD EXTENSION OR REMOVE IT IS APPROPRIATE**     Skilled Nursing Facility: [facility]    Change dressing?: Yes    Wound Cleansing: Vashe wash - Apply enough Vashe to soak a piece of gauze and place on wound bed for 5-10 minutes. No need to rinse after soaking.    Darlin Wound Topical Treatments: No-Sting barrier film with wound vac on right foot       Dressings:           Wound Location: **NEW WOUND** left distal foot     Primary Dressing to wound bed: Collagen (I.e. Puracol)      Cover and Secure with:  4X4 gauze pad and Bulky roll gauze     Change dressing: Daily      Dressings:           Wound Location: Left Heel     Primary Dressing to wound bed: Paint eschar with betadine      Cover and Secure with:  4X4 gauze pad and Bulky roll gauze     Change dressing: Daily       [x]Negative Pressure:           Wound Location: right foot ( wet to dry dressing with vashe until patient returns to skilled nursing facility and reapply wound vac today)    Use No-Sting Barrier film to darlin wound with each dressing change (DO NOT USE if Dermatac Drape from [facility] is used).   Pressure @ 125 mmHg continuous using black foam.     []Apply White foam on exposed bone and/or tunnels:   Change dressing: Monday/Wednesday/Friday  Make sure that tubing is not against the skin by using gauze and/or  Requirements:  (n/a)      Nutrition Related Findings:  Recent covid, now negative. Pt seen, this morning, off bipap, alert, answered all questions appropriately. Denied abdominal pain, nausea. Not much appetite. Talked with RN, plans to have SLP evaluate for possible diet initiation due to medical improvements. Of note per GI notes-concerns of worsening anal ulcer/bleed with oral intake. Also note consideration of PEG if intubated. TPN continues for now for nutrition support at goal macronutrients at 100 ml/hr. Sodium 134, Creatinine 1.4, Glucose 170, Chemstick 180, Phosphorus 3.5, Magnesium 2.  2 BM in the last 24 hours. Rx: vanc, zosyn, humalog, iron, lantus.       Wounds:  (stage II perineum, skin tear scrotum, stage II coccyx, DTI buttocks, stage II buttocks)       Current Nutrition Therapies:    Diet NPO Time Specified  PN-Adult  3 IN 1 Central Line (Custom)   · Parenteral Nutrition Orders:  · Type and Formula: 3-in-1 Custom(88 kg, 25 kcals/kg/day, 30% lipid kcals, 1.5 grams protein/kg/day)   · Rate/Volume: 100 ml/hr  · Duration: Continuous  · Current/Goal PN Orders Provides: ~ 2200 kcals, 132 grams protein, 73 grams fat, 298 grams CHO per 24 hours    Anthropometric Measures:  · Height: 5' 8\" (172.7 cm)  · Current Body Weight: 284 lb 6.4 oz (129 kg)(10/30/20 +1 generalized, +2 LE edema)   · Admission Body Weight: 285 lb (129.3 kg)(9/23/20, stated, +1 BLE and BUE edema)    · Usual Body Weight: 306 lb (138.8 kg)(EMR, 6/29/20, [facility]  [facility]     · Ideal Body Weight: 154 lbs;   · BMI: 47.1  · Adjusted Body Weight:  ; (193# (88 kg) adjusted for TPN)   · BMI Categories: Obese Class 3 (BMI 40.0 or greater)       Nutrition Diagnosis:   · Inadequate oral intake related to cognitive or neurological impairment, swallowing difficulty as evidenced by NPO or clear liquid status due to medical condition, nutrition support - parenteral nutrition      Nutrition Interventions:   Food and/or Nutrient

## 2024-10-09 ENCOUNTER — TELEPHONE (OUTPATIENT)
Dept: PULMONOLOGY | Age: 54
End: 2024-10-09

## 2024-10-09 DIAGNOSIS — J98.4 RESTRICTIVE LUNG DISEASE: ICD-10-CM

## 2024-10-09 DIAGNOSIS — R76.8 ANA POSITIVE: ICD-10-CM

## 2024-10-09 DIAGNOSIS — J84.9 ILD (INTERSTITIAL LUNG DISEASE) (HCC): Primary | ICD-10-CM

## 2024-10-09 DIAGNOSIS — U09.9 POST-COVID SYNDROME: ICD-10-CM

## 2024-10-09 NOTE — TELEPHONE ENCOUNTER
Covering provider for SANDRA Escobar CNP in-basket during His absence. Please notify MARÍA was abnormal. Remaining bloodwork negative. Recommend referral to rheumatology. Referral order placed.    Also the report says preliminary report. Please call the lab to ensure we have all final results as this was drawn 1 month ago. Thanks!

## 2024-10-10 ENCOUNTER — APPOINTMENT (OUTPATIENT)
Age: 54
End: 2024-10-10
Payer: COMMERCIAL

## 2024-10-11 NOTE — TELEPHONE ENCOUNTER
Patient was notified, said he stated he was supposed to be seen at [facility] rheumatology.

## 2024-10-14 NOTE — TELEPHONE ENCOUNTER
Referral to [facility] rheum was cx by patient because he wanted to go to [facility]. I called [facility] to check on status of referral and they stated they do not take patient's insurance. I notified patient, please place new referral to [facility] rheum . Thank you !

## 2024-10-14 NOTE — TELEPHONE ENCOUNTER
Would he prefer I send the referral to Dr. Richard or keep my current referral to [facility]? THanks!

## 2024-10-15 ENCOUNTER — ANTI-COAG VISIT (OUTPATIENT)
Age: 54
End: 2024-10-15
Payer: COMMERCIAL

## 2024-10-15 DIAGNOSIS — Z86.718 HISTORY OF DVT (DEEP VEIN THROMBOSIS): ICD-10-CM

## 2024-10-15 DIAGNOSIS — I48.91 ATRIAL FIBRILLATION, UNSPECIFIED TYPE (HCC): Primary | Chronic | ICD-10-CM

## 2024-10-15 DIAGNOSIS — I26.99 PULMONARY EMBOLISM, UNSPECIFIED CHRONICITY, UNSPECIFIED PULMONARY EMBOLISM TYPE, UNSPECIFIED WHETHER ACUTE COR PULMONALE PRESENT (HCC): ICD-10-CM

## 2024-10-15 DIAGNOSIS — Z79.01 ANTICOAGULATED ON COUMADIN: ICD-10-CM

## 2024-10-15 DIAGNOSIS — Z51.81 ENCOUNTER FOR THERAPEUTIC DRUG MONITORING: ICD-10-CM

## 2024-10-15 LAB — POC INR: 2.6 (ref 0.8–1.2)

## 2024-10-15 PROCEDURE — 85610 PROTHROMBIN TIME: CPT | Performed by: PHARMACIST

## 2024-10-15 PROCEDURE — 99211 OFF/OP EST MAY X REQ PHY/QHP: CPT | Performed by: PHARMACIST

## 2024-10-15 RX ORDER — EMPAGLIFLOZIN 25 MG/1
25 TABLET, FILM COATED ORAL DAILY
COMMUNITY
Start: 2024-10-14

## 2024-10-15 RX ORDER — FOLIC ACID 1 MG/1
1 TABLET ORAL DAILY
COMMUNITY

## 2024-10-15 NOTE — PROGRESS NOTES
Medication Management Clinic  [facility]  Anticoagulation Clinic  [phone] (phone)  [phone] (fax)    Mr. Holger Aguilar is a 53 y.o.  male with history of DVT, PE, Afib who presents today for anticoagulation monitoring and adjustment.    Patient verifies current dosing regimen and tablet strength.  Missed his dose on 10/11, so he took 12.5mg on 10/12  Patient denies s/s bleeding/bruising/swelling/SOB  No blood in urine or stool.  No dietary changes.  No changes in OTC agents/Herbals. Started Jardiance, stopped Metformin, Lasix, Sucralfate, Glipizide.  No change in alcohol use or tobacco use.  No change in activity level.  Patient denies falls.  No vomiting/diarrhea or acute illness.   No Procedures scheduled in the future at this time.    Assessment:   Lab Results   Component Value Date    INR 2.60 (H) 10/15/2024    INR 2.00 (H) 09/18/2024    INR 2.10 (H) 09/10/2024     INR therapeutic   Recent Labs     10/15/24  0856   INR 2.60*         Plan:  Continue Coumadin 5mg MWF and 7.5mg TThSaS.  Recheck INR in 4 week(s).  Patient reminded to call the Anticoagulation Clinic with any signs or symptoms of bleeding or with any medication changes.  Patient given instructions utilizing the teach back method.        After visit summary printed and reviewed with patient.      Discharged in  in no apparent distress.    Trudi Lema, NomanD, BCPS, CACP, CTTS     For Pharmacy Admin Tracking Only    Time Spent (min): 20

## 2024-10-31 ENCOUNTER — TELEPHONE (OUTPATIENT)
Age: 54
End: 2024-10-31

## 2024-10-31 NOTE — TELEPHONE ENCOUNTER
Patient called and states that he went on a trip and his luggage got lost.  All medications were in the luggage, he has not had Coumadin since 10/24/24.  Please call the patient back with instructions and how to get his Coumadin.

## 2024-10-31 NOTE — TELEPHONE ENCOUNTER
Called patient back and told him to take 10 mg today and 10 mg tomorrow (11/1) and then to resume his normal regimen. His appointment was rescheduled to 11/6.

## 2024-11-06 ENCOUNTER — ANTI-COAG VISIT (OUTPATIENT)
Age: 54
End: 2024-11-06
Payer: COMMERCIAL

## 2024-11-06 DIAGNOSIS — Z51.81 ENCOUNTER FOR THERAPEUTIC DRUG MONITORING: ICD-10-CM

## 2024-11-06 DIAGNOSIS — I48.91 ATRIAL FIBRILLATION, UNSPECIFIED TYPE (HCC): Primary | Chronic | ICD-10-CM

## 2024-11-06 DIAGNOSIS — Z86.718 HISTORY OF DVT (DEEP VEIN THROMBOSIS): ICD-10-CM

## 2024-11-06 DIAGNOSIS — Z79.01 ANTICOAGULATED ON COUMADIN: ICD-10-CM

## 2024-11-06 DIAGNOSIS — I26.99 PULMONARY EMBOLISM, UNSPECIFIED CHRONICITY, UNSPECIFIED PULMONARY EMBOLISM TYPE, UNSPECIFIED WHETHER ACUTE COR PULMONALE PRESENT (HCC): ICD-10-CM

## 2024-11-06 LAB — POC INR: 2.5 (ref 0.8–1.2)

## 2024-11-06 PROCEDURE — 85610 PROTHROMBIN TIME: CPT

## 2024-11-06 PROCEDURE — 99211 OFF/OP EST MAY X REQ PHY/QHP: CPT

## 2024-11-06 NOTE — PROGRESS NOTES
Medication Management Clinic  [facility]  Anticoagulation Clinic  [phone] (phone)  [phone] (fax)    Mr. Holger Aguilar is a 54 y.o.  male with history of PE/DVT/atrial fib., per referral from BHUPINDER Vasquez, who presents today for Warfarin monitoring and adjustment (6 days after resuming Coumadin; at least 1/2 hour late for today's visit - came from seeing PCP).    Patient verifies current tablet strength. Has mistakenly been taking 5 mg on Sundays, instead of 7.5 mg.  Patient called this clinic 10/31 to report having no Coumadin since 10/24 (lost luggage that contained it).  Took 10 mg 10/31 and 11/1, as directed.  Patient denies bruising.  Has usual SOB/swelling of left leg. For about a month, has had blood from right nare first thing in A.M., after blowing nose.  Reminded of need to keep nasal membrane moist and how to do so.  No blood in urine or stool.  Dietary changes: eating more greens. Has lost 30# since starting Mounjaro in May, he states - 5 of those in past month.  No changes in medication/OTC agents/herbals.  No change in alcohol use or tobacco use.  No change in activity level.  Patient denies falls.  No vomiting/diarrhea or acute illness.   No procedures scheduled in the future at this time.    Assessment:     Lab Results   Component Value Date    INR 2.50 (H) 11/06/2024    INR 2.60 (H) 10/15/2024    INR 2.00 (H) 09/18/2024     INR therapeutic - goal 2-3.  Recent Labs     11/06/24  1107   INR 2.50*      Plan:  POCT INR performed/result reviewed.  Continue PO Coumadin 5 mg MWF, 7.5 mg TThSS as ordered.  Recheck INR in 2 week(s). (Report given - orders received from/verified with FLY Barclay RP., PharmD.)  Patient reminded to call the Anticoagulation Clinic with any signs or symptoms of bleeding or with any medication changes.  Patient given instructions utilizing the teach back method.         After visit summary printed and reviewed with patient.      Discharged via

## 2024-11-11 ENCOUNTER — APPOINTMENT (OUTPATIENT)
Age: 54
End: 2024-11-11
Payer: COMMERCIAL

## 2024-11-19 ENCOUNTER — ANTI-COAG VISIT (OUTPATIENT)
Age: 54
End: 2024-11-19
Payer: COMMERCIAL

## 2024-11-19 DIAGNOSIS — I26.99 PULMONARY EMBOLISM, UNSPECIFIED CHRONICITY, UNSPECIFIED PULMONARY EMBOLISM TYPE, UNSPECIFIED WHETHER ACUTE COR PULMONALE PRESENT (HCC): ICD-10-CM

## 2024-11-19 DIAGNOSIS — I48.91 ATRIAL FIBRILLATION, UNSPECIFIED TYPE (HCC): Primary | Chronic | ICD-10-CM

## 2024-11-19 DIAGNOSIS — Z79.01 ANTICOAGULATED ON COUMADIN: ICD-10-CM

## 2024-11-19 DIAGNOSIS — Z51.81 ENCOUNTER FOR THERAPEUTIC DRUG MONITORING: ICD-10-CM

## 2024-11-19 DIAGNOSIS — Z86.718 HISTORY OF DVT (DEEP VEIN THROMBOSIS): ICD-10-CM

## 2024-11-19 LAB — POC INR: 3.6 (ref 0.8–1.2)

## 2024-11-19 PROCEDURE — 85610 PROTHROMBIN TIME: CPT

## 2024-11-19 PROCEDURE — 99212 OFFICE O/P EST SF 10 MIN: CPT

## 2024-11-19 NOTE — PROGRESS NOTES
Medication Management Clinic  [facility]  Anticoagulation Clinic  [phone] (phone)  [phone] (fax)    Mr. Holger Aguilar is a 54 y.o.  male with history of PE/DVT/atrial fib., per referral from BHUPINDER Vasquez, who presents today for Warfarin monitoring and adjustment (2 week visit - taking wrong last visit; at least 20 minutes early for today's visit).    Patient verifies current tablet strength.  No missed doses. Has been taking 7.5 mg MWF, 5 mg other days, instead of 5 mg MWF, 7.5 mg other days.  \"I didn't look at paper.\"  Advised to follow printed instructions.  Patient denies bleeding/bruising. Has usual SOB/left leg swelling.  No blood in urine or stool.  No dietary changes.  No changes in medication/OTC agents/herbals.  No change in alcohol use or tobacco use.  No change in activity level.  Recently tripped on something at [employer] and fell - did not hit head.  No vomiting/diarrhea or acute illness.   No procedures scheduled in the future at this time.  Had lost 30# since May at time of last visit - on Mounjaro.  No further weight loss.    Assessment:   Lab Results   Component Value Date    INR 3.60 (H) 11/19/2024    INR 2.50 (H) 11/06/2024    INR 2.60 (H) 10/15/2024     INR supratherapeutic - goal 2-3.  Recent Labs     11/19/24  1014   INR 3.60*        Plan:  POCT INR performed/result reviewed.  Hold today, T, then get back on ordered dose of PO Coumadin 5 mg MWF, 7.5 mg TThSS.  Recheck INR in 2 week(s). (Report given - orders entered by ESTEFANY Lema [facility] PharmD.)  Patient reminded to call the Anticoagulation Clinic with any signs or symptoms of bleeding or with any medication changes.  Patient given instructions utilizing the teach back method.     After visit summary printed and reviewed with patient.    Advised extra caution.  Discharged via transport chair in no apparent distress to  to get walker.    For Pharmacy Admin Tracking Only    Intervention Detail: Dose

## 2024-12-05 ENCOUNTER — APPOINTMENT (OUTPATIENT)
Age: 54
End: 2024-12-05
Payer: COMMERCIAL

## 2024-12-05 ENCOUNTER — TELEPHONE (OUTPATIENT)
Age: 54
End: 2024-12-05

## 2024-12-05 NOTE — TELEPHONE ENCOUNTER
Patient left message on voicemail needing to reschedule today's appt.  Called patient.  Only day he could come next week is the 12th - will see at 10:00.  Understanding/agreement voiced.

## 2024-12-12 ENCOUNTER — APPOINTMENT (OUTPATIENT)
Age: 54
End: 2024-12-12
Payer: COMMERCIAL

## 2024-12-19 ENCOUNTER — ANTI-COAG VISIT (OUTPATIENT)
Age: 54
End: 2024-12-19
Payer: COMMERCIAL

## 2024-12-19 DIAGNOSIS — I26.99 PULMONARY EMBOLISM, UNSPECIFIED CHRONICITY, UNSPECIFIED PULMONARY EMBOLISM TYPE, UNSPECIFIED WHETHER ACUTE COR PULMONALE PRESENT (HCC): ICD-10-CM

## 2024-12-19 DIAGNOSIS — Z86.718 HISTORY OF DVT (DEEP VEIN THROMBOSIS): ICD-10-CM

## 2024-12-19 DIAGNOSIS — Z51.81 ENCOUNTER FOR THERAPEUTIC DRUG MONITORING: ICD-10-CM

## 2024-12-19 DIAGNOSIS — Z79.01 ANTICOAGULATED ON COUMADIN: ICD-10-CM

## 2024-12-19 DIAGNOSIS — I48.91 ATRIAL FIBRILLATION, UNSPECIFIED TYPE (HCC): Primary | Chronic | ICD-10-CM

## 2024-12-19 LAB — POC INR: 3.2 (ref 0.8–1.2)

## 2024-12-19 PROCEDURE — 85610 PROTHROMBIN TIME: CPT

## 2024-12-19 PROCEDURE — 99211 OFF/OP EST MAY X REQ PHY/QHP: CPT

## 2024-12-19 RX ORDER — PETROLATUM,WHITE
OINTMENT IN PACKET (GRAM) TOPICAL PRN
COMMUNITY

## 2024-12-19 NOTE — PROGRESS NOTES
Medication Management Clinic  [facility]  Anticoagulation Clinic  [phone] (phone)  [phone] (fax)    Mr. Holger Aguilar is a 54 y.o.  male with history of PE/DVT/atrial fib., per referral from BHUPINDER Vasquez, who presents today for Warfarin monitoring and adjustment (2 weeks late for 2 week visit - at least 20 minutes early for today's visit; patient canceled 12/5 and 12/12, no show 12/17).    Patient verifies current dosing regimen and tablet strength.  No missed (except as ordered last visit and in hospital) or extra doses.  Patient denies bruising. Has usual SOB/swelling of left leg.  No blood in urine or stool.  No dietary changes. Weight stable (had lost 30# from May to early November).  Changes in medication/OTC agents/herbals: states Hydralazine was increased in the hospital from BID to TID. Finished 10 day course of Keflex yesterday for left leg infection. On same Mounjaro dose.  No change in alcohol use or tobacco use.  No change in activity level.  Patient denies falls.  No vomiting/diarrhea or acute illness.   No procedures scheduled in the future at this time.  States was admitted to [facility] overnight 11/22 for nosebleeds/high BP. Said he got no Coumadin 11/22, Friday. (Unable to find data under Care Everywhere at this time.)  No bleeding since.  Using Vaseline in nose - not on oxygen.    Assessment:     Lab Results   Component Value Date    INR 3.20 (H) 12/19/2024    INR 3.60 (H) 11/19/2024    INR 2.50 (H) 11/06/2024     INR supratherapeutic - goal 2-3.  Recent Labs     12/19/24  1414   INR 3.20*      Plan:  POCT INR performed/result reviewed.  Decrease PO Coumadin to 7.5 mg MWF, 5 mg TThSS (from 5 mg MWF, 7.5 mg TThSS=5.6% decrease).  Recheck INR in 2 week(s).  (Report given - orders entered by ALHAJI Foster [facility]., PharmD.)  Patient reminded to call the Anticoagulation Clinic with any signs or symptoms of bleeding or with any medication changes.  Patient given instructions

## 2025-01-09 ENCOUNTER — ANTI-COAG VISIT (OUTPATIENT)
Age: 55
End: 2025-01-09
Payer: MEDICARE

## 2025-01-09 DIAGNOSIS — Z79.01 ANTICOAGULATED ON COUMADIN: ICD-10-CM

## 2025-01-09 DIAGNOSIS — I48.91 ATRIAL FIBRILLATION, UNSPECIFIED TYPE (HCC): Primary | Chronic | ICD-10-CM

## 2025-01-09 DIAGNOSIS — Z86.718 HISTORY OF DVT (DEEP VEIN THROMBOSIS): ICD-10-CM

## 2025-01-09 DIAGNOSIS — Z51.81 ENCOUNTER FOR THERAPEUTIC DRUG MONITORING: ICD-10-CM

## 2025-01-09 DIAGNOSIS — I26.99 PULMONARY EMBOLISM, UNSPECIFIED CHRONICITY, UNSPECIFIED PULMONARY EMBOLISM TYPE, UNSPECIFIED WHETHER ACUTE COR PULMONALE PRESENT (HCC): ICD-10-CM

## 2025-01-09 LAB — POC INR: 2.4 (ref 0.8–1.2)

## 2025-01-09 PROCEDURE — 85610 PROTHROMBIN TIME: CPT

## 2025-01-09 PROCEDURE — 99211 OFF/OP EST MAY X REQ PHY/QHP: CPT

## 2025-01-09 NOTE — PROGRESS NOTES
Medication Management Clinic  [facility]  Anticoagulation Clinic  [phone] (phone)  [phone] (fax)    Mr. Holger Aguilar is a 54 y.o.  male with history of DVT, PE, Afib who presents today for anticoagulation monitoring and adjustment.    Patient verifies current dosing regimen and tablet strength.  No missed or extra doses.  Patient denies s/s bleeding/bruising/swelling/SOB  No blood in urine or stool.  No dietary changes.  No changes in medication/OTC agents/Herbals.  No change in alcohol use or tobacco use.  No change in activity level.  Patient denies falls.  No vomiting/diarrhea or acute illness.   No Procedures scheduled in the future at this time.    Assessment:   Lab Results   Component Value Date    INR 2.40 (H) 01/09/2025    INR 3.20 (H) 12/19/2024    INR 3.60 (H) 11/19/2024     INR therapeutic   Recent Labs     01/09/25  0907   INR 2.40*     Plan:  Continue Coumadin 7.5 mg MoWeFr and 5 mg SuTuThSa.  Recheck INR in 3 week(s).  Patient reminded to call the Anticoagulation Clinic with any signs or symptoms of bleeding or with any medication changes.  Patient given instructions utilizing the teach back method.    After visit summary printed and reviewed with patient.      Discharged in transport chair in no apparent distress.    For Pharmacy Admin Tracking Only  Time Spent (min): 15

## 2025-01-13 NOTE — PROGRESS NOTES
by Zacarias Serrano MD at [facility] OR    SIGMOIDOSCOPY N/A 10/26/2020    SIGMOIDOSCOPY DIAGNOSTIC FLEXIBLE performed by Spencer Smith MD at [facility]    TONSILLECTOMY      TRACHEOSTOMY      as a baby    UPPER GASTROINTESTINAL ENDOSCOPY N/A 11/14/2020    EGD DIAGNOSTIC ONLY performed by Samantha Whatley MD at [facility]    UPPER GASTROINTESTINAL ENDOSCOPY N/A 12/27/2021    EGD performed by Samantha Whatley MD at [facility]     SOCIAL HISTORY:  Social History     Tobacco Use    Smoking status: Never    Smokeless tobacco: Never   Vaping Use    Vaping status: Never Used   Substance Use Topics    Alcohol use: Not Currently     Comment: hx of abuse    Drug use: No     ALLERGIES:  Allergies   Allergen Reactions    Penicillins      Tolerated Zosyn 9/24/2020     FAMILY HISTORY:  Family History   Problem Relation Age of Onset    Heart Disease Mother         MI    Cancer Paternal Aunt         lung     CURRENT MEDICATIONS:  Current Outpatient Medications   Medication Sig Dispense Refill    albuterol sulfate HFA (PROVENTIL;VENTOLIN;PROAIR) 108 (90 Base) MCG/ACT inhaler Inhale 2 puffs into the lungs every 6 hours as needed for Wheezing 18 g 5    ANORO ELLIPTA 62.5-25 MCG/ACT inhaler Inhale 1 puff into the lungs daily 60 each 5    white petrolatum ointment Apply topically as needed Apply topically as needed to inside nose.      JARDIANCE 25 MG tablet Take 1 tablet by mouth daily      folic acid (FOLVITE) 1 MG tablet Take 1 tablet by mouth daily      hydrALAZINE (APRESOLINE) 50 MG tablet Take 1 tablet by mouth 2 times daily (Patient taking differently: Take 1 tablet by mouth 3 times daily) 180 tablet 1    amLODIPine (NORVASC) 10 MG tablet Take 1 tablet by mouth daily 90 tablet 1    warfarin (COUMADIN) 5 MG tablet TAKE AS DIRECTED BY [facility] MEDICATION MANAGEMENT CLINIC 135 Tabs = 90 Day supply 135 tablet 1    polyethylene glycol (GLYCOLAX) 17 g packet Take 1 packet by mouth daily as needed for

## 2025-01-14 ENCOUNTER — OFFICE VISIT (OUTPATIENT)
Dept: PULMONOLOGY | Age: 55
End: 2025-01-14
Payer: MEDICARE

## 2025-01-14 VITALS
HEART RATE: 77 BPM | SYSTOLIC BLOOD PRESSURE: 142 MMHG | DIASTOLIC BLOOD PRESSURE: 80 MMHG | OXYGEN SATURATION: 98 % | WEIGHT: 285.8 LBS | HEIGHT: 68 IN | BODY MASS INDEX: 43.32 KG/M2 | TEMPERATURE: 98.9 F

## 2025-01-14 DIAGNOSIS — R06.02 SOB (SHORTNESS OF BREATH) ON EXERTION: Primary | ICD-10-CM

## 2025-01-14 DIAGNOSIS — R76.8 ANA POSITIVE: ICD-10-CM

## 2025-01-14 DIAGNOSIS — J84.9 ILD (INTERSTITIAL LUNG DISEASE) (HCC): ICD-10-CM

## 2025-01-14 DIAGNOSIS — R76.8 RHEUMATOID FACTOR POSITIVE: ICD-10-CM

## 2025-01-14 DIAGNOSIS — J98.4 RESTRICTIVE LUNG DISEASE: ICD-10-CM

## 2025-01-14 DIAGNOSIS — U09.9 POST-COVID SYNDROME: ICD-10-CM

## 2025-01-14 PROCEDURE — 3077F SYST BP >= 140 MM HG: CPT

## 2025-01-14 PROCEDURE — 99214 OFFICE O/P EST MOD 30 MIN: CPT

## 2025-01-14 PROCEDURE — 3079F DIAST BP 80-89 MM HG: CPT

## 2025-01-14 PROCEDURE — 3017F COLORECTAL CA SCREEN DOC REV: CPT

## 2025-01-14 PROCEDURE — G8417 CALC BMI ABV UP PARAM F/U: HCPCS

## 2025-01-14 PROCEDURE — 1036F TOBACCO NON-USER: CPT

## 2025-01-14 PROCEDURE — G8427 DOCREV CUR MEDS BY ELIG CLIN: HCPCS

## 2025-01-14 RX ORDER — ALBUTEROL SULFATE 90 UG/1
2 INHALANT RESPIRATORY (INHALATION) EVERY 6 HOURS PRN
Qty: 18 G | Refills: 5 | Status: SHIPPED | OUTPATIENT
Start: 2025-01-14

## 2025-01-14 RX ORDER — UMECLIDINIUM BROMIDE AND VILANTEROL TRIFENATATE 62.5; 25 UG/1; UG/1
1 POWDER RESPIRATORY (INHALATION) DAILY
Qty: 60 EACH | Refills: 5 | Status: SHIPPED | OUTPATIENT
Start: 2025-01-14

## 2025-01-14 ASSESSMENT — ENCOUNTER SYMPTOMS
SINUS PRESSURE: 0
CHEST TIGHTNESS: 0
RHINORRHEA: 0
COUGH: 0
SINUS PAIN: 0
WHEEZING: 0
SHORTNESS OF BREATH: 1

## 2025-01-24 ENCOUNTER — APPOINTMENT (OUTPATIENT)
Age: 55
End: 2025-01-24
Payer: MEDICARE

## 2025-01-30 ENCOUNTER — HOSPITAL ENCOUNTER (OUTPATIENT)
Dept: PULMONOLOGY | Age: 55
Discharge: HOME | End: 2025-01-30
Payer: MEDICARE

## 2025-01-30 ENCOUNTER — HOSPITAL ENCOUNTER (OUTPATIENT)
Dept: CT IMAGING | Age: 55
Discharge: HOME | End: 2025-01-30
Payer: MEDICARE

## 2025-01-30 ENCOUNTER — ANTI-COAG VISIT (OUTPATIENT)
Age: 55
End: 2025-01-30
Payer: MEDICARE

## 2025-01-30 DIAGNOSIS — Z86.718 HISTORY OF DVT (DEEP VEIN THROMBOSIS): ICD-10-CM

## 2025-01-30 DIAGNOSIS — J84.9 ILD (INTERSTITIAL LUNG DISEASE) (HCC): ICD-10-CM

## 2025-01-30 DIAGNOSIS — I26.99 PULMONARY EMBOLISM, UNSPECIFIED CHRONICITY, UNSPECIFIED PULMONARY EMBOLISM TYPE, UNSPECIFIED WHETHER ACUTE COR PULMONALE PRESENT (HCC): ICD-10-CM

## 2025-01-30 DIAGNOSIS — R06.02 SOB (SHORTNESS OF BREATH) ON EXERTION: ICD-10-CM

## 2025-01-30 DIAGNOSIS — Z51.81 ENCOUNTER FOR THERAPEUTIC DRUG MONITORING: ICD-10-CM

## 2025-01-30 DIAGNOSIS — J98.4 RESTRICTIVE LUNG DISEASE: ICD-10-CM

## 2025-01-30 DIAGNOSIS — R76.8 RHEUMATOID FACTOR POSITIVE: ICD-10-CM

## 2025-01-30 DIAGNOSIS — Z79.01 ANTICOAGULATED ON COUMADIN: ICD-10-CM

## 2025-01-30 DIAGNOSIS — I48.91 ATRIAL FIBRILLATION, UNSPECIFIED TYPE (HCC): Primary | Chronic | ICD-10-CM

## 2025-01-30 LAB — POC INR: 1.6 (ref 0.8–1.2)

## 2025-01-30 PROCEDURE — 94060 EVALUATION OF WHEEZING: CPT

## 2025-01-30 PROCEDURE — 71250 CT THORAX DX C-: CPT

## 2025-01-30 PROCEDURE — 85610 PROTHROMBIN TIME: CPT

## 2025-01-30 PROCEDURE — 94726 PLETHYSMOGRAPHY LUNG VOLUMES: CPT

## 2025-01-30 PROCEDURE — 99212 OFFICE O/P EST SF 10 MIN: CPT

## 2025-01-30 PROCEDURE — 94729 DIFFUSING CAPACITY: CPT

## 2025-01-30 NOTE — PROGRESS NOTES
Medication Management Clinic  [facility]  Anticoagulation Clinic  [phone] (phone)  [phone] (fax)    Mr. Holger Aguilar is a 54 y.o.  male with history of DVT, PE, Afib who presents today for anticoagulation monitoring and adjustment.    Patient verifies current dosing regimen and tablet strength.  No missed or extra doses.  Patient denies s/s bleeding/bruising/swelling/SOB  No blood in urine or stool.  States he's had a bag of broccoli, little bits at a time, throughout this past week. Reminded of the Vitamin K content. He says this is abnormal for him and will get other vegetables to snack on. Also had uriel greens a week ago.  No changes in medication/OTC agents/Herbals.  No change in alcohol use or tobacco use.  No change in activity level.  Patient denies falls.  No vomiting/diarrhea or acute illness.   No Procedures scheduled in the future at this time.    Assessment:   Lab Results   Component Value Date    INR 1.60 (H) 01/30/2025    INR 2.40 (H) 01/09/2025    INR 3.20 (H) 12/19/2024     INR subtherapeutic   Recent Labs     01/30/25  0851   INR 1.60*     Plan:  Take 10 mg today then continue Coumadin 7.5 mg MoWeFr and 5 mg SuTuThSa.  Recheck INR in 1.5 week(s).  Patient reminded to call the Anticoagulation Clinic with any signs or symptoms of bleeding or with any medication changes.  Patient given instructions utilizing the teach back method.    After visit summary printed and reviewed with patient.      Discharged in transport chair in no apparent distress.    For Pharmacy Admin Tracking Only    Intervention Detail: Dose Adjustment: 1, reason: Therapy Optimization  Total # of Interventions Recommended: 1  Total # of Interventions Accepted: 1  Time Spent (min): 20

## 2025-02-19 ENCOUNTER — APPOINTMENT (OUTPATIENT)
Age: 55
End: 2025-02-19
Payer: MEDICARE

## 2025-02-26 ENCOUNTER — APPOINTMENT (OUTPATIENT)
Age: 55
End: 2025-02-26
Payer: MEDICARE

## 2025-03-06 ENCOUNTER — APPOINTMENT (OUTPATIENT)
Age: 55
End: 2025-03-06
Payer: MEDICARE

## 2025-03-07 ENCOUNTER — ANTI-COAG VISIT (OUTPATIENT)
Age: 55
End: 2025-03-07
Payer: MEDICARE

## 2025-03-07 DIAGNOSIS — Z51.81 ENCOUNTER FOR THERAPEUTIC DRUG MONITORING: ICD-10-CM

## 2025-03-07 DIAGNOSIS — Z79.01 ANTICOAGULATED ON COUMADIN: ICD-10-CM

## 2025-03-07 DIAGNOSIS — Z86.718 HISTORY OF DVT (DEEP VEIN THROMBOSIS): ICD-10-CM

## 2025-03-07 DIAGNOSIS — I48.91 ATRIAL FIBRILLATION, UNSPECIFIED TYPE (HCC): Primary | Chronic | ICD-10-CM

## 2025-03-07 DIAGNOSIS — I26.99 PULMONARY EMBOLISM, UNSPECIFIED CHRONICITY, UNSPECIFIED PULMONARY EMBOLISM TYPE, UNSPECIFIED WHETHER ACUTE COR PULMONALE PRESENT (HCC): ICD-10-CM

## 2025-03-07 LAB — POC INR: 2.2 (ref 0.8–1.2)

## 2025-03-07 PROCEDURE — 85610 PROTHROMBIN TIME: CPT | Performed by: PHARMACIST

## 2025-03-07 PROCEDURE — 99212 OFFICE O/P EST SF 10 MIN: CPT | Performed by: PHARMACIST

## 2025-03-07 RX ORDER — WARFARIN SODIUM 5 MG/1
TABLET ORAL
Qty: 135 TABLET | Refills: 1 | OUTPATIENT
Start: 2025-03-07 | End: 2025-03-07 | Stop reason: SDUPTHER

## 2025-03-07 RX ORDER — WARFARIN SODIUM 5 MG/1
TABLET ORAL
Qty: 45 TABLET | Refills: 5 | OUTPATIENT
Start: 2025-03-07

## 2025-03-07 NOTE — PROGRESS NOTES
Medication Management Clinic  [facility]  Anticoagulation Clinic  [phone] (phone)  [phone] (fax)    Mr. Holger Aguilar is a 54 y.o.  male with history of DVT, PE, Afib who presents today for anticoagulation monitoring and adjustment.    Patient verifies current dosing regimen and tablet strength.  No missed or extra doses.  Patient denies s/s bleeding/bruising/swelling/SOB  No blood in urine or stool.  No dietary changes.  No changes in medication/OTC agents/Herbals.  No change in alcohol use or tobacco use.  No change in activity level.  Patient denies falls.  No vomiting/diarrhea or acute illness.   No Procedures scheduled in the future at this time.  3 weeks late for a 2 week INR      Assessment:   Lab Results   Component Value Date    INR 2.20 (H) 03/07/2025    INR 1.60 (H) 01/30/2025    INR 2.40 (H) 01/09/2025     INR therapeutic   Recent Labs     03/07/25 0933   INR 2.20*         Plan:  Continue Coumadin 7.5mg MWF and 5mg TThSaS.  Recheck INR in 4 week(s).  Patient reminded to call the Anticoagulation Clinic with any signs or symptoms of bleeding or with any medication changes.  Patient given instructions utilizing the teach back method.      Refill sent per CPA  After visit summary printed and reviewed with patient.      Discharged in  in no apparent distress.    Trudi Lema, NomanD, BCPS, CTTS     For Pharmacy Admin Tracking Only    Intervention Detail: Refill(s) Provided  Total # of Interventions Recommended: 1  Total # of Interventions Accepted: 1  Time Spent (min): 20

## 2025-03-17 ENCOUNTER — TELEPHONE (OUTPATIENT)
Age: 55
End: 2025-03-17

## 2025-03-17 NOTE — TELEPHONE ENCOUNTER
Noted. Added to follow-up calendar to follow for discharge from [facility].    Preet Rosenbaum PharmD, BCPS  3/17/2025  9:40 AM

## 2025-03-17 NOTE — TELEPHONE ENCOUNTER
Nydia with [facility] called for records on patient.  Patient was in their ER yesterday and admitted this AM.   Records were faxed from the 3/7/25 visit to [facility] [phone]

## 2025-04-03 ENCOUNTER — TELEPHONE (OUTPATIENT)
Age: 55
End: 2025-04-03

## 2025-04-03 NOTE — TELEPHONE ENCOUNTER
Called patient to confirm his appointment today.  He states that he has been in [facility] since last Thursday for infection in his leg.  Appointment for today has been cancelled.

## 2025-05-08 NOTE — PROGRESS NOTES
[facility] Progress Note    NAME: Brenda Skaggs  DATE: 20  ROOM #: [id number]  : 1970  REASON FOR VISIT: Foot Pain (L foot and ankle pain)  CODE STATUS: FULL CODE  ADMISSION DATE: 2020  SKILLED PATIENT: Yes    History obtained from chart review, the patient and nursing staff. SUBJECTIVE:  HPI: Brenda Skaggs is a 44 y.o. male. Pt seen and examined at bedside. L foot/ankle pain:  Going on a wk or so  Not sure how it started  Pain in L foot and ankle  No pain at rest   Worse with standing and pivoting  Hard to bear wt  No sig swelling  Hx of gout, but doesn't feel quite that bad  No redness       I have reviewed patients past medical, surgical, social, and family history and have made updates where appropriate. Allergies and Medications were reviewed through the [facility] EMR.       Patient Active Problem List    Diagnosis Date Noted    Perirectal abscess 2020    Hypertension, essential     Dyslipidemia     Aortic stenosis, mild to moderate     Acute on chronic anemia     Dysphagia     Primary osteoarthritis of left hip     Normocytic anemia 2020    Physical deconditioning 2020    Hallucinations 2020    Wound of buttock 2020    History of atrial fibrillation     BPH (benign prostatic hyperplasia)     Carpal tunnel syndrome on right      rt hand      Chronic gout     DM2 (diabetes mellitus, type 2) (HCC)     Heart failure with preserved ejection fraction (HCC)     History of alcohol abuse     History of osteomyelitis      Hx of R foot wound, osteomyelitis 2018 requiring surgery and IV Vanco      Hypogonadism, male     Major depression     Morbid obesity ([address]     DURAN (nonalcoholic steatohepatitis)     KIARA treated with BiPAP     Vitamin D deficiency     Mood disorder (HCC)     GERD (gastroesophageal reflux disease)        Review of Systems  Positive responses are highlighted in bold    Constitutional:  Fever, Chills, Night Sweats, Fatigue, Unexpected changes in weight  HENT:  Ear pain, Tinnitus, Nosebleeds, Trouble swallowing, Hearing loss, Sore throat  Cardiovascular:  Chest Pain, Palpitations, Orthopnea, Paroxysmal Nocturnal Dyspnea  Respiratory:  Cough, Wheezing, Shortness of breath, Chest tightness, Apnea  Gastrointestinal:  Nausea, Vomiting, Diarrhea, Constipation, Heartburn, Blood in stool  Genitourinary:  Difficulty or painful urination, Flank pain, Change in frequency, Urgency  Skin:  Color change, Rash, Itching, Wound  Musculoskeletal:  Joint pain, Back pain, Gait problems, Joint swelling, Myalgias  Neurological:  Dizziness, Headaches, Presyncope, Numbness, Seizures, Tremors  Endocrine:  Heat Intolerance, Cold Intolerance, Polydipsia, Polyphagia, Polyuria      PHYSICAL EXAM:  Vitals:    12/02/20 1615   BP: 122/83   Pulse: 83   Resp: 18   Temp: 98 °F (36.7 °C)   Weight: 250 lb (113.4 kg)   Height: 5' 8\" (1.727 m)     Body mass index is 38.01 kg/m². Pain Score:   4(L foot/ankle)     VS Reviewed  General Appearance: well developed and well- nourished, in no acute distress  Head: normocephalic and atraumatic  Eyes: conjunctivae and eye lids without erythema  ENT: external ear and ear canal normal bilaterally, nose without deformity, nasal mucosa and turbinates normal without polyps, oropharynx normal, dentition is normal for age, no lip or gum lesions noted  Neck: supple and non-tender without mass, no thyromegaly or thyroid nodules, no cervical lymphadenopathy  Pulmonary/Chest: clear to auscultation bilaterally- no wheezes, rales or rhonchi, normal air movement, no respiratory distress or retractions  Cardiovascular: normal rate, regular rhythm, normal S1 and S2, no murmurs, rubs, clicks, or gallops, distal pulses intact  Abdomen: soft, non-tender, non-distended, bowel sounds physiologic,  no rebound or guarding, no masses or hernias noted. Liver and spleen without enlargement.    Extremities: no cyanosis, clubbing or edema of the lower extremities  Musculoskeletal: No joint swelling or gross deformity   Neuro:  Alert, 5/5 strength globally and symmetrically, normal speech, no focal findings or movement disorder noted  Psych:  Normal affect without evidence of depression or anxiety, insight and judgement are appropriate, memory appears intact  Skin: warm and dry, no rash or erythema  ANKLE EXAM:  Examination of the left ankle demonstrates: There is somewhat swelling of the ankle. There is somewhat a joint effusion. There is tenderness of the lateral malleolus. There is tenderness of the medial malleolus. There is not tenderness of the fifth metatarsal.  There is tenderness over the ATFL. There is not tenderness over the proximal fibula. There is not tenderness of the calcaneous. The achilles is intact. Aggarwal test negative. There is not laxity with anterior drawer testing. There is not laxity with Inversion testing. There is not laxity with Eversion testing. L Foot: tenderness between the 1st and 5th metatarsal head and normal microcirculation and capillary reflow      ASSESSMENT & PLAN  1. Acute foot pain, left    Unclear etiology  Could be gout, but not totally c/w with that  Will get xray 1st and go from there  Will see if April can f/u with pt tomorrow    2.  Acute left ankle pain    As per # 1      Electronically signed by Loreda Heimlich, DO on 12/2/2020 at 9:23 PM DISCHARGE

## 2025-05-20 ENCOUNTER — OFFICE VISIT (OUTPATIENT)
Dept: PULMONOLOGY | Age: 55
End: 2025-05-20
Payer: MEDICARE

## 2025-05-20 VITALS
WEIGHT: 276.8 LBS | OXYGEN SATURATION: 95 % | BODY MASS INDEX: 41.95 KG/M2 | DIASTOLIC BLOOD PRESSURE: 70 MMHG | HEART RATE: 73 BPM | TEMPERATURE: 98.9 F | HEIGHT: 68 IN | SYSTOLIC BLOOD PRESSURE: 128 MMHG

## 2025-05-20 DIAGNOSIS — R06.02 SOB (SHORTNESS OF BREATH) ON EXERTION: ICD-10-CM

## 2025-05-20 DIAGNOSIS — J84.9 ILD (INTERSTITIAL LUNG DISEASE) (HCC): ICD-10-CM

## 2025-05-20 DIAGNOSIS — J84.9 ILD (INTERSTITIAL LUNG DISEASE) (HCC): Primary | ICD-10-CM

## 2025-05-20 DIAGNOSIS — E66.01 MORBID OBESITY (HCC): Chronic | ICD-10-CM

## 2025-05-20 PROCEDURE — 99214 OFFICE O/P EST MOD 30 MIN: CPT

## 2025-05-20 PROCEDURE — G8427 DOCREV CUR MEDS BY ELIG CLIN: HCPCS

## 2025-05-20 PROCEDURE — 3074F SYST BP LT 130 MM HG: CPT

## 2025-05-20 PROCEDURE — 3017F COLORECTAL CA SCREEN DOC REV: CPT

## 2025-05-20 PROCEDURE — 3078F DIAST BP <80 MM HG: CPT

## 2025-05-20 PROCEDURE — 1036F TOBACCO NON-USER: CPT

## 2025-05-20 PROCEDURE — G8417 CALC BMI ABV UP PARAM F/U: HCPCS

## 2025-05-20 RX ORDER — ALBUTEROL SULFATE 90 UG/1
2 INHALANT RESPIRATORY (INHALATION) EVERY 6 HOURS PRN
Qty: 18 G | Refills: 5 | Status: SHIPPED | OUTPATIENT
Start: 2025-05-20

## 2025-05-20 ASSESSMENT — ENCOUNTER SYMPTOMS
COUGH: 0
SHORTNESS OF BREATH: 1
RHINORRHEA: 0
SORE THROAT: 0
CHEST TIGHTNESS: 0
WHEEZING: 1

## 2025-05-20 NOTE — PROGRESS NOTES
USPSTF recommends annual screening for lung cancer with low-dose computed tomography (LDCT) in adults aged 50 to 80 years who have a 20 pack-year smoking history and currently smoke or have quit within the past 15 years. Screening should be discontinued once a person has not smoked for 15 years or develops a health problem that substantially limits life expectancy or the ability or willingness to have curative lung surgery.     CT Chest:   CT CHEST HIGH RESOLUTION 01/30/2025    Narrative  PROCEDURE: CT CHEST HIGH RESOLUTION    CLINICAL INFORMATION: Interstitial pulmonary disease, unspecified; Other  specified abnormal immunological findings in serum; Shortness of breath    COMPARISON: CTA chest 2/13/2023.    TECHNIQUE:  1 mm axial imaging from the lung apices to the lung bases every 15 mm with lung  enhanced reconstruction. 5 mm axial imaging was then performed from above the  lung apices through the adrenal glands without IV contrast.    All CT scans at this facility use dose modulation, iterative reconstruction,  and/or weight based dosing when appropriate to reduce the radiation dose to as  low as reasonably achievable.      FINDINGS:  Heart/mediastinum: The thyroid gland is unremarkable. The heart size is normal.  No pericardial effusion is observed. The aorta is nondilated. No mediastinal,  hilar, or axillary lymphadenopathy is visualized accounting for lack of IV  contrast.    Lungs: No lobar consolidation, pleural effusion, or pneumothorax is identified.  Dependent atelectasis/mild scarring at the lung bases is unchanged. A benign  calcified granuloma is noted at the right lung base (series 2, image 32). No  pulmonary mass or nodule is observed. No honeycombing or bronchiectasis is  visualized.    Upper abdomen: Hepatomegaly and hepatic steatosis are unchanged. No acute  findings are visualized in the limited images through the upper abdomen.    Musculoskeletal: Multilevel degenerative disc disease is

## 2025-06-16 ENCOUNTER — TELEPHONE (OUTPATIENT)
Age: 55
End: 2025-06-16

## 2025-06-16 NOTE — TELEPHONE ENCOUNTER
Called patient to reschedule, patient has No Showed 5/12, 5/27 and 6/6.  Patient states that his blood has been good with he see his PCP and Dr. Guzmán and why when he comes to us it's not.  Patient states that he is very un happy with the Coumadin Clinic and that he is going to follow with PCP and hung up on me.

## 2025-06-16 NOTE — TELEPHONE ENCOUNTER
Patient last seen in clinic 3/7/2025 and had INR = 2.2.    Since last visit, pt had a hospitalization and discharge to ECF.  Following ECF discharge has been No show for re-scheduled appointments: 5/8, 5/12 ,5/27 and 6/6.      Pt is referred to our clinic by Eva ROE (PCP).  Called office and informed of missed appointments and patient's interest in having PCP manage Coumadin.  Office states Eva Rodriguez normally has patients managed by a coumadin clinic.  Pt has PCP appointment 6/20/25 and office will place note for Eva Rodriguez to discuss this with patient.      Called and spoke to patient.  He reports when he was in the nursing home that Dr Guzmán was doing blood work and his INR was 1.0.  States he was off coumadin at that time and \"all my blood work was straight.\"  Voiced frustration with having low INRs when we was seen in clinic and wanting to \"opt out.\"   Reports he did not resume taking coumadin when discharged from ECF in April, he was \"tired of messing with it.\"  Reminded patient that he had been prescribed coumadin for DVT/PE history, Atrial fibrillation and the risk of stroke and recurrent blood clots by not taking coumadin. Patient continued to be insistent that he did not want to resume Coumadin.  Encouraged him to discuss other anticoagulant options with Eva Rodriguez at upcoming visit.      Left message with SIERRA Corado at Eva ROE office to inform them that patient has been off Coumadin since April and requested call back.

## 2025-06-20 NOTE — TELEPHONE ENCOUNTER
Called PCP, Eva Rodriguez's office, spoke to Mickie.  She relays that PCP instructed patient to discuss with Dr. Guzmán, as patient states that Dr. Guzmán told him he does not need to be on Coumadin.  PCP office does not manage Coumadin, so patient would need to continue to follow here.  As patient is refusing to schedule and appt, advised PCP office as they are the current referring provider that we will follow policy and send patient a discharge letter.  Page at PCP office voiced understanding.

## 2025-07-01 ENCOUNTER — TELEPHONE (OUTPATIENT)
Age: 55
End: 2025-07-01

## 2025-07-01 NOTE — TELEPHONE ENCOUNTER
Will discharge from [facility] anticoagulation clinic per protocol due to no shows.    Preet Rosenbaum, NomanD, BCPS  7/1/2025  9:06 AM

## 2025-08-07 ENCOUNTER — OFFICE VISIT (OUTPATIENT)
Age: 55
End: 2025-08-07
Payer: MEDICARE

## 2025-08-07 VITALS
HEART RATE: 79 BPM | DIASTOLIC BLOOD PRESSURE: 84 MMHG | WEIGHT: 276.3 LBS | HEIGHT: 68 IN | SYSTOLIC BLOOD PRESSURE: 126 MMHG | BODY MASS INDEX: 41.88 KG/M2 | OXYGEN SATURATION: 97 %

## 2025-08-07 DIAGNOSIS — Z11.1 SCREENING-PULMONARY TB: Primary | ICD-10-CM

## 2025-08-07 DIAGNOSIS — R53.83 FATIGUE, UNSPECIFIED TYPE: ICD-10-CM

## 2025-08-07 DIAGNOSIS — M45.0 ANKYLOSING SPONDYLITIS OF MULTIPLE SITES IN SPINE (HCC): ICD-10-CM

## 2025-08-07 DIAGNOSIS — Z11.59 NEED FOR HEPATITIS B SCREENING TEST: ICD-10-CM

## 2025-08-07 PROCEDURE — G8417 CALC BMI ABV UP PARAM F/U: HCPCS | Performed by: INTERNAL MEDICINE

## 2025-08-07 PROCEDURE — 3074F SYST BP LT 130 MM HG: CPT | Performed by: INTERNAL MEDICINE

## 2025-08-07 PROCEDURE — 3017F COLORECTAL CA SCREEN DOC REV: CPT | Performed by: INTERNAL MEDICINE

## 2025-08-07 PROCEDURE — 1036F TOBACCO NON-USER: CPT | Performed by: INTERNAL MEDICINE

## 2025-08-07 PROCEDURE — 99204 OFFICE O/P NEW MOD 45 MIN: CPT | Performed by: INTERNAL MEDICINE

## 2025-08-07 PROCEDURE — G8427 DOCREV CUR MEDS BY ELIG CLIN: HCPCS | Performed by: INTERNAL MEDICINE

## 2025-08-07 PROCEDURE — 3079F DIAST BP 80-89 MM HG: CPT | Performed by: INTERNAL MEDICINE

## 2025-08-07 RX ORDER — APIXABAN 5 MG/1
TABLET, FILM COATED ORAL
COMMUNITY
Start: 2025-07-31

## 2025-08-07 ASSESSMENT — ENCOUNTER SYMPTOMS
SHORTNESS OF BREATH: 1
ABDOMINAL PAIN: 0
VOMITING: 0
NAUSEA: 0
COUGH: 0

## 2025-08-07 ASSESSMENT — JOINT PAIN
TOTAL NUMBER OF TENDER JOINTS: 11
TOTAL NUMBER OF SWOLLEN JOINTS: 11

## 2025-08-11 LAB
ALBUMIN: 4.3 G/DL
ALP BLD-CCNC: 128 U/L
ALT SERPL-CCNC: 59 U/L
ANION GAP SERPL CALCULATED.3IONS-SCNC: NORMAL MMOL/L
AST SERPL-CCNC: 45 U/L
BASOPHILS ABSOLUTE: 0 /ΜL
BASOPHILS RELATIVE PERCENT: 1 %
BILIRUB SERPL-MCNC: 0.6 MG/DL (ref 0.1–1.4)
BUN BLDV-MCNC: 30 MG/DL
C-REACTIVE PROTEIN: 9
CALCIUM SERPL-MCNC: 9.8 MG/DL
CHLORIDE BLD-SCNC: 105 MMOL/L
CO2: 17 MMOL/L
CREAT SERPL-MCNC: 1.32 MG/DL
EOSINOPHILS ABSOLUTE: 0.2 /ΜL
EOSINOPHILS RELATIVE PERCENT: 3 %
GFR, ESTIMATED: 63
GLUCOSE BLD-MCNC: 186 MG/DL
HCT VFR BLD CALC: 49.8 % (ref 41–53)
HEMOGLOBIN: 16.2 G/DL (ref 13.5–17.5)
LYMPHOCYTES ABSOLUTE: 2.2 /ΜL
LYMPHOCYTES RELATIVE PERCENT: 30 %
MCH RBC QN AUTO: 29.6 PG
MCHC RBC AUTO-ENTMCNC: 35.5 G/DL
MCV RBC AUTO: 91 FL
MONOCYTES ABSOLUTE: 0.6 /ΜL
MONOCYTES RELATIVE PERCENT: 8 %
NEUTROPHILS ABSOLUTE: 4.2 /ΜL
NEUTROPHILS RELATIVE PERCENT: 57 %
PDW BLD-RTO: 15.7 %
PLATELET # BLD: 238 K/ΜL
PMV BLD AUTO: NORMAL FL
POTASSIUM SERPL-SCNC: 4.4 MMOL/L
RBC # BLD: 5.48 10^6/ΜL
SED RATE, AUTOMATED: 27
SODIUM BLD-SCNC: 141 MMOL/L
TOTAL PROTEIN: 7.3 G/DL (ref 6.4–8.2)
WBC # BLD: 7.4 10^3/ML

## 2025-08-13 DIAGNOSIS — M45.0 ANKYLOSING SPONDYLITIS OF MULTIPLE SITES IN SPINE (HCC): Primary | ICD-10-CM

## 2025-08-13 RX ORDER — IXEKIZUMAB 80 MG/ML
160 INJECTION, SOLUTION SUBCUTANEOUS ONCE
Qty: 2 ML | Refills: 0 | Status: SHIPPED | OUTPATIENT
Start: 2025-08-13 | End: 2025-08-14

## 2025-08-13 RX ORDER — IXEKIZUMAB 80 MG/ML
80 INJECTION, SOLUTION SUBCUTANEOUS
Qty: 1 ML | Refills: 5 | Status: SHIPPED | OUTPATIENT
Start: 2025-08-13 | End: 2025-08-14

## 2025-08-14 ENCOUNTER — TELEPHONE (OUTPATIENT)
Age: 55
End: 2025-08-14

## 2025-08-14 DIAGNOSIS — M45.0 ANKYLOSING SPONDYLITIS OF MULTIPLE SITES IN SPINE (HCC): Primary | ICD-10-CM

## 2025-08-14 RX ORDER — SECUKINUMAB 150 MG/ML
150 INJECTION SUBCUTANEOUS
Qty: 1 ML | Refills: 5 | Status: SHIPPED | OUTPATIENT
Start: 2025-08-14 | End: 2026-02-10

## 2025-08-14 RX ORDER — SECUKINUMAB 150 MG/ML
150 INJECTION SUBCUTANEOUS WEEKLY
Qty: 5 ML | Refills: 0 | Status: SHIPPED | OUTPATIENT
Start: 2025-08-14 | End: 2025-09-12

## 2025-08-26 ENCOUNTER — TELEPHONE (OUTPATIENT)
Age: 55
End: 2025-08-26

## (undated) DEVICE — GAUZE SPONGES,USP TYPE VII GAUZE, 12 PLY: Brand: CURITY

## (undated) DEVICE — HYPODERMIC SAFETY NEEDLE: Brand: MAGELLAN

## (undated) DEVICE — 6 ML SYRINGE LUER-LOCK TIP: Brand: MONOJECT

## (undated) DEVICE — SOLUTION IV IRRIG WATER 500ML POUR BRL ST 2F7113

## (undated) DEVICE — SYRINGE MED 10ML LUERLOCK TIP W/O SFTY DISP

## (undated) DEVICE — 4-PORT MANIFOLD: Brand: NEPTUNE 2

## (undated) DEVICE — TRAY NRV BLK PARACERVICAL PUDEN W/ 10ML CTRL SYR

## (undated) DEVICE — BANDAGE ADH W1XL3IN NAT FAB WVN FLX DURABLE N ADH PD SEAL

## (undated) DEVICE — TOWEL,OR,DSP,ST,BLUE,STD,4/PK,20PK/CS: Brand: MEDLINE

## (undated) DEVICE — 3 ML SYRINGE LUER-LOCK TIP: Brand: MONOJECT

## (undated) DEVICE — SET ADMIN 25ML L117IN PMP MOD CK VLV RLER CLMP 2 SMRTSITE

## (undated) DEVICE — CHLORAPREP 26ML CLEAR

## (undated) DEVICE — NEEDLE SYR 18GA L1.5IN RED PLAS HUB S STL BLNT FILL W/O

## (undated) DEVICE — GLOVE ORANGE PI 7 1/2   MSG9075

## (undated) DEVICE — SHEET,DRAPE,3/4,53X77,STERILE: Brand: MEDLINE

## (undated) DEVICE — NEEDLE SPNL 22GA L3.5IN BLK HUB S STL REG WALL FIT STYL W/

## (undated) DEVICE — BIOGUARD A/W CLEANING ADAPTER

## (undated) DEVICE — SOLUTION IV 1000ML 0.45% SOD CHL PH 5 INJ USP VIAFLX PLAS

## (undated) DEVICE — SET LNR RED GRN W/ BASE CLEANASCOPE

## (undated) DEVICE — SYRINGE MED 5ML STD CLR PLAS LUERLOCK TIP N CTRL DISP

## (undated) DEVICE — SYRINGE 10ML FLUSH ST PREFILLED W/SALINE

## (undated) DEVICE — CONMED SCOPE SAVER BITE BLOCK, 20X27 MM: Brand: SCOPE SAVER

## (undated) DEVICE — GAUZE,SPONGE,4"X4",12PLY,STERILE,LF,2'S: Brand: MEDLINE